# Patient Record
Sex: FEMALE | Race: WHITE | NOT HISPANIC OR LATINO | Employment: OTHER | URBAN - METROPOLITAN AREA
[De-identification: names, ages, dates, MRNs, and addresses within clinical notes are randomized per-mention and may not be internally consistent; named-entity substitution may affect disease eponyms.]

---

## 2017-02-02 ENCOUNTER — LAB VISIT (OUTPATIENT)
Dept: LAB | Facility: HOSPITAL | Age: 67
End: 2017-02-02
Attending: NURSE PRACTITIONER
Payer: MEDICARE

## 2017-02-02 DIAGNOSIS — E11.42 DIABETIC PERIPHERAL NEUROPATHY ASSOCIATED WITH TYPE 2 DIABETES MELLITUS: ICD-10-CM

## 2017-02-02 PROCEDURE — 83036 HEMOGLOBIN GLYCOSYLATED A1C: CPT

## 2017-02-02 PROCEDURE — 36415 COLL VENOUS BLD VENIPUNCTURE: CPT

## 2017-02-03 DIAGNOSIS — E11.42 DIABETIC PERIPHERAL NEUROPATHY ASSOCIATED WITH TYPE 2 DIABETES MELLITUS: ICD-10-CM

## 2017-02-03 LAB
ESTIMATED AVG GLUCOSE: 229 MG/DL
HBA1C MFR BLD HPLC: 9.6 %

## 2017-02-03 NOTE — TELEPHONE ENCOUNTER
----- Message from Karmen Galdamez sent at 2/3/2017 11:02 AM CST -----  Contact: Self [phone]  Pt needs a prescription refill for insulin detemir (LEVEMIR FLEXTOUCH) 100 unit/mL (3 mL) SubQ InPn pen.     [facility] [place], [address]  [phone] (Phone)  [phone] (Fax)

## 2017-02-14 ENCOUNTER — OFFICE VISIT (OUTPATIENT)
Dept: ENDOCRINOLOGY | Facility: CLINIC | Age: 67
End: 2017-02-14
Payer: MEDICARE

## 2017-02-14 VITALS
SYSTOLIC BLOOD PRESSURE: 130 MMHG | HEIGHT: 67 IN | BODY MASS INDEX: 32.55 KG/M2 | WEIGHT: 207.38 LBS | HEART RATE: 83 BPM | DIASTOLIC BLOOD PRESSURE: 83 MMHG

## 2017-02-14 DIAGNOSIS — F41.9 ANXIETY: ICD-10-CM

## 2017-02-14 DIAGNOSIS — E11.59 HYPERTENSION ASSOCIATED WITH DIABETES: Chronic | ICD-10-CM

## 2017-02-14 DIAGNOSIS — E11.69 HYPERLIPIDEMIA ASSOCIATED WITH TYPE 2 DIABETES MELLITUS: Chronic | ICD-10-CM

## 2017-02-14 DIAGNOSIS — E11.42 DIABETIC PERIPHERAL NEUROPATHY ASSOCIATED WITH TYPE 2 DIABETES MELLITUS: Primary | Chronic | ICD-10-CM

## 2017-02-14 DIAGNOSIS — E78.5 HYPERLIPIDEMIA ASSOCIATED WITH TYPE 2 DIABETES MELLITUS: Chronic | ICD-10-CM

## 2017-02-14 DIAGNOSIS — E66.9 OBESITY (BMI 30-39.9): ICD-10-CM

## 2017-02-14 DIAGNOSIS — I15.2 HYPERTENSION ASSOCIATED WITH DIABETES: Chronic | ICD-10-CM

## 2017-02-14 PROCEDURE — 1157F ADVNC CARE PLAN IN RCRD: CPT | Mod: S$GLB,,, | Performed by: NURSE PRACTITIONER

## 2017-02-14 PROCEDURE — 3046F HEMOGLOBIN A1C LEVEL >9.0%: CPT | Mod: S$GLB,,, | Performed by: NURSE PRACTITIONER

## 2017-02-14 PROCEDURE — 1126F AMNT PAIN NOTED NONE PRSNT: CPT | Mod: S$GLB,,, | Performed by: NURSE PRACTITIONER

## 2017-02-14 PROCEDURE — 3079F DIAST BP 80-89 MM HG: CPT | Mod: S$GLB,,, | Performed by: NURSE PRACTITIONER

## 2017-02-14 PROCEDURE — 1159F MED LIST DOCD IN RCRD: CPT | Mod: S$GLB,,, | Performed by: NURSE PRACTITIONER

## 2017-02-14 PROCEDURE — 99214 OFFICE O/P EST MOD 30 MIN: CPT | Mod: S$GLB,,, | Performed by: NURSE PRACTITIONER

## 2017-02-14 PROCEDURE — 3060F POS MICROALBUMINURIA REV: CPT | Mod: S$GLB,,, | Performed by: NURSE PRACTITIONER

## 2017-02-14 PROCEDURE — 99999 PR PBB SHADOW E&M-EST. PATIENT-LVL III: CPT | Mod: PBBFAC,,, | Performed by: NURSE PRACTITIONER

## 2017-02-14 PROCEDURE — 2022F DILAT RTA XM EVC RTNOPTHY: CPT | Mod: S$GLB,,, | Performed by: NURSE PRACTITIONER

## 2017-02-14 PROCEDURE — 3075F SYST BP GE 130 - 139MM HG: CPT | Mod: S$GLB,,, | Performed by: NURSE PRACTITIONER

## 2017-02-14 PROCEDURE — 1160F RVW MEDS BY RX/DR IN RCRD: CPT | Mod: S$GLB,,, | Performed by: NURSE PRACTITIONER

## 2017-02-14 RX ORDER — INSULIN LISPRO 100 [IU]/ML
9 INJECTION, SOLUTION INTRAVENOUS; SUBCUTANEOUS
Qty: 1 VIAL | Refills: 11 | Status: SHIPPED | OUTPATIENT
Start: 2017-02-14 | End: 2017-05-05

## 2017-02-14 NOTE — MR AVS SNAPSHOT
[facility] - Endocrinology  [address]  [facility] [address]  Phone: [phone]                  Lorena Contreras   2017 1:30 PM   Office Visit    Description:  Female : 1950   Provider:  JAYCEE Hathaway,ANP-C   Department:  [facility] - Endocrinology           Reason for Visit     Diabetes Mellitus           Diagnoses this Visit        Comments    Diabetic peripheral neuropathy associated with type 2 diabetes mellitus    -  Primary     Hypertension associated with diabetes         Hyperlipidemia associated with type 2 diabetes mellitus         Obesity (BMI 30-39.9)         Anxiety                To Do List           Future Appointments        Provider Department Dept Phone    3/7/2017 1:00 PM Kenneth Luu MD [facility] - Internal Medicine [phone]    2017 10:00 AM [facility] [phone]    2017 10:10 AM SPECIMEN, [facility] [phone]    2017 10:30 AM JAYCEE Hathaway,ANP-C [facility] [phone]      Goals (5 Years of Data)     None      Follow-Up and Disposition     Return in about 3 months (around 2017).    Follow-up and Disposition History       These Medications        Disp Refills Start End    insulin lispro (HUMALOG) 100 unit/mL injection 1 vial 11 2017    Inject 9 Units into the skin 3 (three) times daily before meals. - Subcutaneous    Pharmacy: [facility] [address] [place] [address] Ph #: [phone]         [facility]     [facility] Care Line -  Assistance  Registered nurses in the [facility] Center provide clinical advisement, health education, appointment booking, and other advisory services.  Call for this free service at [phone].             Medications           Message regarding Medications     Verify the changes and/or additions to your medication regime listed  "below are the same as discussed with your clinician today.  If any of these changes or additions are incorrect, please notify your healthcare provider.        START taking these NEW medications        Refills    insulin lispro (HUMALOG) 100 unit/mL injection 11    Sig: Inject 9 Units into the skin 3 (three) times daily before meals.    Class: Normal    Route: Subcutaneous      STOP taking these medications     insulin aspart (NOVOLOG) 100 unit/mL InPn pen 9 units with meals           Verify that the below list of medications is an accurate representation of the medications you are currently taking.  If none reported, the list may be blank. If incorrect, please contact your healthcare provider. Carry this list with you in case of emergency.           Current Medications     amlodipine (NORVASC) 10 MG tablet Take 1 tablet (10 mg total) by mouth once daily.    atenolol (TENORMIN) 50 MG tablet Take 1 tablet (50 mg total) by mouth 2 (two) times daily.    bismuth tribrom-petrolatum,wh (XEROFORM) 5 X 9 " Bndg Apply dressing to wound daily    blood sugar diagnostic (FREESTYLE TEST) Strp Test blood glucose QAC    ergocalciferol (VITAMIN D2) 50,000 unit Cap Take 1 capsule (50,000 Units total) by mouth every 7 days.    fluoxetine (PROZAC) 10 MG Tab Take 1 tablet (10 mg total) by mouth once daily.    furosemide (LASIX) 20 MG tablet Take 1 tablet (20 mg total) by mouth daily as needed (leg swelling).    gabapentin (NEURONTIN) 300 MG capsule Take 1 capsule (300 mg total) by mouth 3 (three) times daily.    hydrochlorothiazide (HYDRODIURIL) 25 MG tablet Take 1 tablet (25 mg total) by mouth once daily.    insulin detemir (LEVEMIR FLEXTOUCH) 100 unit/mL (3 mL) SubQ InPn pen Inject 30 Units into the skin every evening.    lancets 32 gauge Misc 1 lancet by Misc.(Non-Drug; Combo Route) route 4 (four) times daily.    magnesium oxide (MAG-OX) 400 mg tablet Take 400 mg by mouth once daily.    metformin (GLUCOPHAGE) 1000 MG tablet Take 1 " "tablet (1,000 mg total) by mouth 2 (two) times daily with meals.    pen needle, diabetic 32 gauge x 5/32" Ndle Use with Novolog and Levemir Flexpens    tramadol (ULTRAM) 50 mg tablet Take 1 to 2 tabs every 6 hours as needed for pain.    insulin lispro (HUMALOG) 100 unit/mL injection Inject 9 Units into the skin 3 (three) times daily before meals.           Clinical Reference Information           Your Vitals Were     BP Pulse Height Weight BMI    130/83 (BP Location: Left arm, Patient Position: Sitting) 83 5' 7" (1.702 m) 94.1 kg (207 lb 6.4 oz) 32.48 kg/m2      Blood Pressure          Most Recent Value    BP  130/83      Allergies as of 2/14/2017     Novolin 70/30 (Semi-synthetic)    Victoza [Liraglutide]    Glipizide    Asa [Aspirin]    Citric Acid    Codeine    Egg Derived    Iodine Containing Multivitamin    Peanut    Rituxan [Rituximab]    Soy    Sulfa (Sulfonamide Antibiotics)    Talwin [Pentazocine Lactate]    Zoloft [Sertraline]      Immunizations Administered on Date of Encounter - 2/14/2017     None      Orders Placed During Today's Visit     Future Labs/Procedures Expected by Expires    Hemoglobin A1c  2/14/2017 2/14/2018    Microalbumin/creatinine urine ratio  2/14/2017 4/15/2018      [facility] Sign-Up     Activating your [facility] account is as easy as 1-2-3!     1) Visit [email or web address], select Sign Up Now, enter this activation code and your date of birth, then select Next.  [email or web address]  Expires: 3/31/2017  2:11 PM      2) Create a username and password to use when you visit [facility] in the future and select a security question in case you lose your password and select Next.    3) Enter your e-mail address and click Sign Up!    Additional Information  If you have questions, please e-mail [email or web address] or call [phone] to talk to our [facility] staff. Remember, [facility] is NOT to be used for urgent needs. For medical emergencies, dial 911.         Language Assistance Services     " ATTENTION: Language assistance services are available, free of charge. Please call [phone].      ATENCIÓN: Si habla louisaañol, tiene a jim disposición servicios gratuitos de asistencia lingüística. Llame al [phone].     CHÚ Ý: N?u b?n nói Ti?ng Vi?t, có các d?ch v? h? tr? ngôn ng? mi?n phí dành cho b?n. G?i s? [phone].         [facility] complies with applicable Federal civil rights laws and does not discriminate on the basis of race, color, national origin, age, disability, or sex.

## 2017-02-14 NOTE — PROGRESS NOTES
"CC: Management of Type 2 diabetes and review of chronic medical conditions as listed in the visit diagnosis     HPI: Mrs. Lorena Contreras is a 66 y.o. White female who was diagnosed with Type 2 in 2010 based on labwork. She was on Glipizide, but this was discontinued r/t recurrent hypoglycemia.  She is now on MDI and Metformin. She has been on Januvia in the past, but it was discontinued when she was in the coverage gap. She attempted Victoza, but she experienced severe nausea and abdominal pain. She also reports she lost 8lb on Victoza.     Arrives today with family.     States she misses doses of Humalog at times, but is unable to quantify how often she misses. Sometimes takes Humalog up to one hour after meals.     Doesn't eat breakfast. Snacks between meals on pretzels and SF caramel. No exercise.     Reports her spouse has been sick and has required hospitalization once.     CURRENT DIABETIC MEDS: Levemir 30 units QHS, Humalog 9 units with each meal, and Metformin 1, 000 mg twice daily  Uses Vials or Pens: Pens  Type of Glucose Meter: True Result    Last Eye Exam: 2014  Last Podiatry Exam: None    REVIEW OF SYSTEMS  General: no weakness; (+) chronic fatigue; stable weight, which fluctuates by ~4 lbs.    Eyes: no blurry vision, eye pain, or discharge.    Cardiac: no chest pain or palpitations.   Respiratory: no cough or dyspnea.   GI: no abdominal pain, diarrhea, constipation; occasional nausea.   Skin: no rashes, wounds, or bruising; no insulin injection site reactions.   Neuro: feet with numbness and tingling; reports insomnia, which has been going on for "a while".   Endocrine: no polyuria, polydipsia, polyphagia; (+) nocturia.    Vital Signs  Visit Vitals    /83 (BP Location: Left arm, Patient Position: Sitting)    Pulse 83    Ht 5' 7" (1.702 m)    Wt 94.1 kg (207 lb 6.4 oz)    BMI 32.48 kg/m2       Hemoglobin A1C   Date Value Ref Range Status   02/02/2017 9.6 (H) 4.5 - 6.2 % Final     " Comment:     According to ADA guidelines, hemoglobin A1C <7.0% represents  optimal control in non-pregnant diabetic patients.  Different  metrics may apply to specific populations.   Standards of Medical Care in Diabetes - 2016.  For the purpose of screening for the presence of diabetes:  <5.7%     Consistent with the absence of diabetes  5.7-6.4%  Consistent with increasing risk for diabetes   (prediabetes)  >or=6.5%  Consistent with diabetes  Currently no consensus exists for use of hemoglobin A1C  for diagnosis of diabetes for children.     10/28/2016 10.4 (H) 4.5 - 6.2 % Final     Comment:     According to ADA guidelines, hemoglobin A1C <7.0% represents  optimal control in non-pregnant diabetic patients.  Different  metrics may apply to specific populations.   Standards of Medical Care in Diabetes - 2016.  For the purpose of screening for the presence of diabetes:  <5.7%     Consistent with the absence of diabetes  5.7-6.4%  Consistent with increasing risk for diabetes   (prediabetes)  >or=6.5%  Consistent with diabetes  Currently no consensus exists for use of hemoglobin A1C  for diagnosis of diabetes for children.     08/22/2016 11.2 (H) 4.5 - 6.2 % Final     Comment:     According to ADA guidelines, hemoglobin A1C <7.0% represents  optimal control in non-pregnant diabetic patients.  Different  metrics may apply to specific populations.   Standards of Medical Care in Diabetes - 2016.  For the purpose of screening for the presence of diabetes:  <5.7%     Consistent with the absence of diabetes  5.7-6.4%  Consistent with increasing risk for diabetes   (prediabetes)  >or=6.5%  Consistent with diabetes  Currently no consensus exists for use of hemoglobin A1C  for diagnosis of diabetes for children.         Chemistry        Component Value Date/Time     11/28/2016 1225    K 5.4 (H) 11/28/2016 1225     11/28/2016 1225    CO2 24 11/28/2016 1225    BUN 26 (H) 11/28/2016 1225    CREATININE 1.1 11/28/2016  1225     (H) 11/28/2016 1225        Component Value Date/Time    CALCIUM 9.9 11/28/2016 1225    ALKPHOS 125 01/14/2016 1040    AST 47 (H) 01/14/2016 1040    ALT 47 (H) 01/14/2016 1040    BILITOT 0.3 01/14/2016 1040          Lab Results   Component Value Date    CHOL 203 (H) 10/28/2016    CHOL 214 (H) 08/22/2016    CHOL 212 (H) 10/29/2015     Lab Results   Component Value Date    HDL 42 10/28/2016    HDL 41 08/22/2016    HDL 40 10/29/2015     Lab Results   Component Value Date    LDLCALC 128.8 10/28/2016    LDLCALC 124.2 08/22/2016    LDLCALC 137.4 10/29/2015     Lab Results   Component Value Date    TRIG 161 (H) 10/28/2016    TRIG 244 (H) 08/22/2016    TRIG 173 (H) 10/29/2015     Lab Results   Component Value Date    CHOLHDL 20.7 10/28/2016    CHOLHDL 19.2 (L) 08/22/2016    CHOLHDL 18.9 (L) 10/29/2015     Lab Results   Component Value Date    TSH 0.782 10/28/2016     Lab Results   Component Value Date    MICALBCREAT 10.5 07/24/2015     Vit D, 25-Hydroxy   Date Value Ref Range Status   08/23/2016 87 30 - 96 ng/mL Final     Comment:     Vitamin D deficiency.........<10 ng/mL                              Vitamin D insufficiency......10-29 ng/mL       Vitamin D sufficiency........> or equal to 30 ng/mL  Vitamin D toxicity............>100 ng/mL       PHYSICAL EXAMINATION  Constitutional: Appears well, no distress  Neck: Supple, trachea midline.   Respiratory: CTA without wheezes, even and unlabored.  Cardiovascular: RRR; no carotid bruits or murmurs.   Lymph: DP pulses  2+ bilaterally; no edema.   Skin: warm and dry; no injection site reactions, no acanthosis nigracans observed.  Neuro:patient alert and cooperative, normal affect.    Diabetes Foot Exam:   Protective Sensation (w/ 10 gram monofilament and tuning fork)  Right: Decreased bilaterally with tuning fork  Left: Decreased bilaterally with tuning fork    Visual Inspection:  Normal -  Bilateral and no callouses, interdigital maceration, ulcers, open wounds,  or sores.     Pedal Pulses (DP):   Right: Present  Left: Present    Assessment/Plan  1. Diabetic peripheral neuropathy associated with type 2 diabetes mellitus   DM uncontrolled, but improving. Continue current insulin doses. Take prandial insulin in tandem with meals, not an hour after.     Interested in VGo, will run insurance to check for coverage. May continue Metformin. Monitor BG 4x/day.     Also interested in GLP 1 agonist, but will wait to see benefit information for VGo first so that we are not eating into her coverage and causing her to go into the coverage gap sooner.     Despite noted allergy to Relion 70/30, she tolerated Levemir and Novolog well.    2. Essential hypertension: Continue Amlodipine, Atenolol, and HCTZ.  Reports she takes Furosemide prn. Managed by Dr. Luu.    3. Hyperlipidemia: Has never been on treatment. No treatment at this time. Discussed uncontrolled lipids. Continues to refuse meds.    4. Obesity, Body mass index is 32.48 kg/(m^2).: Can worsen insulin resistance. Weight loss can help.    5. Anxiety: Continue Prozac. Managed by Dr. Luu     FOLLOW UP  Return in about 3 months (around 5/14/2017).     Orders Placed This Encounter   Procedures    Hemoglobin A1c     Standing Status:   Future     Standing Expiration Date:   2/14/2018    Microalbumin/creatinine urine ratio     Standing Status:   Future     Standing Expiration Date:   4/15/2018     Order Specific Question:   Specimen Source     Answer:   Urine

## 2017-02-15 ENCOUNTER — TELEPHONE (OUTPATIENT)
Dept: DIABETES | Facility: CLINIC | Age: 67
End: 2017-02-15

## 2017-02-24 ENCOUNTER — TELEPHONE (OUTPATIENT)
Dept: ENDOCRINOLOGY | Facility: CLINIC | Age: 67
End: 2017-02-24

## 2017-03-07 ENCOUNTER — TELEPHONE (OUTPATIENT)
Dept: INTERNAL MEDICINE | Facility: CLINIC | Age: 67
End: 2017-03-07

## 2017-03-07 ENCOUNTER — OFFICE VISIT (OUTPATIENT)
Dept: INTERNAL MEDICINE | Facility: CLINIC | Age: 67
End: 2017-03-07
Payer: MEDICARE

## 2017-03-07 VITALS — HEART RATE: 62 BPM | SYSTOLIC BLOOD PRESSURE: 132 MMHG | DIASTOLIC BLOOD PRESSURE: 68 MMHG | HEIGHT: 67 IN

## 2017-03-07 DIAGNOSIS — G47.00 INSOMNIA, UNSPECIFIED TYPE: ICD-10-CM

## 2017-03-07 DIAGNOSIS — E83.42 HYPOMAGNESEMIA: ICD-10-CM

## 2017-03-07 DIAGNOSIS — K08.89 TOOTH PAIN: Primary | ICD-10-CM

## 2017-03-07 DIAGNOSIS — L65.9 HAIR LOSS: ICD-10-CM

## 2017-03-07 DIAGNOSIS — E87.5 HYPERKALEMIA: ICD-10-CM

## 2017-03-07 DIAGNOSIS — E11.3592 PROLIFERATIVE DIABETIC RETINOPATHY OF LEFT EYE ASSOCIATED WITH TYPE 2 DIABETES MELLITUS, UNSPECIFIED PROLIFERATIVE RETINOPATHY TYPE: ICD-10-CM

## 2017-03-07 DIAGNOSIS — R21 SKIN RASH: Primary | ICD-10-CM

## 2017-03-07 DIAGNOSIS — G25.81 RESTLESS LEG SYNDROME: ICD-10-CM

## 2017-03-07 DIAGNOSIS — Z85.72 HISTORY OF NON-HODGKIN'S LYMPHOMA: ICD-10-CM

## 2017-03-07 PROCEDURE — 99214 OFFICE O/P EST MOD 30 MIN: CPT | Mod: S$GLB,,, | Performed by: INTERNAL MEDICINE

## 2017-03-07 PROCEDURE — 1159F MED LIST DOCD IN RCRD: CPT | Mod: S$GLB,,, | Performed by: INTERNAL MEDICINE

## 2017-03-07 PROCEDURE — 1157F ADVNC CARE PLAN IN RCRD: CPT | Mod: S$GLB,,, | Performed by: INTERNAL MEDICINE

## 2017-03-07 PROCEDURE — 1125F AMNT PAIN NOTED PAIN PRSNT: CPT | Mod: S$GLB,,, | Performed by: INTERNAL MEDICINE

## 2017-03-07 PROCEDURE — 3060F POS MICROALBUMINURIA REV: CPT | Mod: S$GLB,,, | Performed by: INTERNAL MEDICINE

## 2017-03-07 PROCEDURE — 99499 UNLISTED E&M SERVICE: CPT | Mod: S$PBB,,, | Performed by: INTERNAL MEDICINE

## 2017-03-07 PROCEDURE — 99999 PR PBB SHADOW E&M-EST. PATIENT-LVL III: CPT | Mod: PBBFAC,,, | Performed by: INTERNAL MEDICINE

## 2017-03-07 PROCEDURE — 3075F SYST BP GE 130 - 139MM HG: CPT | Mod: S$GLB,,, | Performed by: INTERNAL MEDICINE

## 2017-03-07 PROCEDURE — 2022F DILAT RTA XM EVC RTNOPTHY: CPT | Mod: S$GLB,,, | Performed by: INTERNAL MEDICINE

## 2017-03-07 PROCEDURE — 1160F RVW MEDS BY RX/DR IN RCRD: CPT | Mod: S$GLB,,, | Performed by: INTERNAL MEDICINE

## 2017-03-07 PROCEDURE — 3078F DIAST BP <80 MM HG: CPT | Mod: S$GLB,,, | Performed by: INTERNAL MEDICINE

## 2017-03-07 PROCEDURE — 3046F HEMOGLOBIN A1C LEVEL >9.0%: CPT | Mod: S$GLB,,, | Performed by: INTERNAL MEDICINE

## 2017-03-07 RX ORDER — IBUPROFEN 800 MG/1
800 TABLET ORAL 3 TIMES DAILY PRN
Qty: 30 TABLET | Refills: 0 | Status: SHIPPED | OUTPATIENT
Start: 2017-03-07 | End: 2018-08-31

## 2017-03-07 RX ORDER — BIOTIN 1000 MCG
1000 TABLET,CHEWABLE ORAL DAILY
COMMUNITY
End: 2018-08-31

## 2017-03-07 RX ORDER — ZOLPIDEM TARTRATE 5 MG/1
5 TABLET ORAL NIGHTLY PRN
Qty: 30 TABLET | Refills: 2 | Status: SHIPPED | OUTPATIENT
Start: 2017-03-07 | End: 2017-11-18 | Stop reason: SDUPTHER

## 2017-03-07 NOTE — TELEPHONE ENCOUNTER
Please clarify with her what she needs it for first; she hasn't had a prescription for it since 2014. I would like to document why it is being sent to the pharmacy.

## 2017-03-07 NOTE — PROGRESS NOTES
"Subjective:       Patient ID: Lorena Contreras is a 66 y.o. female.    Chief Complaint: Follow-up    HPI    Last visit with me 11/2016. Since that time increased Norvasc to 10 mg. Seen by Endocrinology.    Trouble sleeping. Also with tooth pain. Going to see dentist next week.    Anxiety is generally well controlled on Prozac 10 mg, symptoms are still there but manageable.    Pt has iron deficiency anemia, also history of NHL. Seen by Cardiology in past for cardiomegaly. She hasn't kept regular appointments with these teams.    Reviewed PMH, PSH, SH, FH, allergies, and medications.     Review of Systems    As per HPI    Objective:      Physical Exam   Constitutional: She is oriented to person, place, and time. No distress.    woman whose There is no height or weight on file to calculate BMI.    Neurological: She is alert and oriented to person, place, and time.   Psychiatric: Her behavior is normal. Thought content normal. Her mood appears anxious.   Nursing note and vitals reviewed.      Vitals:    03/07/17 1311   BP: 132/68   BP Location: Right arm   Patient Position: Sitting   BP Method: Manual   Pulse: 62   Height: 5' 7" (1.702 m)     There is no height or weight on file to calculate BMI.    RESULTS: Reviewed labs from last 6 months    Assessment:       1. Tooth pain    2. Proliferative diabetic retinopathy of left eye associated with type 2 diabetes mellitus, unspecified proliferative retinopathy type    3. Insomnia, unspecified type    4. Hyperkalemia    5. Hypomagnesemia    6. Hair loss    7. Uncontrolled type 2 diabetes mellitus with diabetic polyneuropathy, with long-term current use of insulin    8. Restless leg syndrome    9. History of non-Hodgkin's lymphoma        Plan:   Lorena was seen today for follow-up.    Diagnoses and all orders for this visit:    Tooth pain:  Need continued follow up with outside Dentistry, take ibuprofen PRN for now to limit pain.  -     ibuprofen (ADVIL,MOTRIN) " 800 MG tablet; Take 1 tablet (800 mg total) by mouth 3 (three) times daily as needed for Pain.    Proliferative diabetic retinopathy of left eye associated with type 2 diabetes mellitus, unspecified proliferative retinopathy type:  Continue follow up with outside Optometry to ensure appopriate treatment for vision.    Insomnia, unspecified type:  Restart Zolpidem. Likely some contribution from anxiety, however the patient reports her anxiety is stable and does not feel need to make changes to regimen at this time.  -     zolpidem (AMBIEN) 5 MG Tab; Take 1 tablet (5 mg total) by mouth nightly as needed.    Hyperkalemia:  Seen on prior labs, will stop lisinopril to limit symptoms. Also low Mg.  -     Basic metabolic panel; Future    Hypomagnesemia:  Persists, continue Mg oxide 400 daily.  -     Magnesium; Future    Hair loss:  Unclear etiology, start over-the-counter biotin to see if this improves symptoms, if not refer to Dermatology.    Uncontrolled type 2 diabetes mellitus with diabetic polyneuropathy, with long-term current use of insulin:  Continue follow up with Endocrinology for management.    Restless leg syndrome:  Has history of iron deficiency anemia, start iron supplement, check levels.  -     Iron and TIBC; Future  -     Ferritin; Future  -     CBC Without Differential; Future    History of non-Hodgkin's lymphoma:  Needs follow up with Oncology to determine appropriate surveillance, emphasized importance of routine follow up, instructed the patient to call to schedule a visit.    Return in about 4 months (around 7/7/2017).  Kenneth Luu MD  Internal Medicine    Portions of this note were completed using Snappli dictation software. Please excuse typographical or syntax errors.

## 2017-03-07 NOTE — MR AVS SNAPSHOT
[facility]  [address]  [place] [address]  Phone: [phone]  Fax: [phone]                  Lorena Contreras   3/7/2017 1:00 PM   Office Visit    Description:  Female : 1950   Provider:  Kenneth Luu MD   Department:  [facility]           Reason for Visit     Annual Exam           Diagnoses this Visit        Comments    Tooth pain    -  Primary     Proliferative diabetic retinopathy of left eye associated with type 2 diabetes mellitus, unspecified proliferative retinopathy type         Insomnia, unspecified type         Hyperkalemia         Hypomagnesemia         Hair loss         Uncontrolled type 2 diabetes mellitus with diabetic polyneuropathy, with long-term current use of insulin         Restless leg syndrome         History of non-Hodgkin's lymphoma                To Do List           Future Appointments        Provider Department Dept Phone    2017 10:00 AM LAB[facility] [phone]    2017 10:10 AM SPECIMEN, [facility] [phone]    2017 10:30 AM JAYCEE Hathaway,ANP-C [facility] - Endocrinology [phone]      Goals (5 Years of Data)     None      Follow-Up and Disposition     Return in about 4 months (around 2017).       These Medications        Disp Refills Start End    ibuprofen (ADVIL,MOTRIN) 800 MG tablet 30 tablet 0 3/7/2017     Take 1 tablet (800 mg total) by mouth 3 (three) times daily as needed for Pain. - Oral    Pharmacy: [facility] [address] [place] [address] [address] [facility] Ph #: [phone]       zolpidem (AMBIEN) 5 MG Tab 30 tablet 2 3/7/2017 2017    Take 1 tablet (5 mg total) by mouth nightly as needed. - Oral    Pharmacy: [facility] - [place] [place] - [address] [facility] Ph #: [phone]         [facility] On Call     [facility] On [facility] Nurse Care Line -  Assistance  Registered nurses  "in the [facility] provide clinical advisement, health education, appointment booking, and other advisory services.  Call for this free service at [phone].             Medications           Message regarding Medications     Verify the changes and/or additions to your medication regime listed below are the same as discussed with your clinician today.  If any of these changes or additions are incorrect, please notify your healthcare provider.        START taking these NEW medications        Refills    ibuprofen (ADVIL,MOTRIN) 800 MG tablet 0    Sig: Take 1 tablet (800 mg total) by mouth 3 (three) times daily as needed for Pain.    Class: Normal    Route: Oral    zolpidem (AMBIEN) 5 MG Tab 2    Sig: Take 1 tablet (5 mg total) by mouth nightly as needed.    Class: Normal    Route: Oral           Verify that the below list of medications is an accurate representation of the medications you are currently taking.  If none reported, the list may be blank. If incorrect, please contact your healthcare provider. Carry this list with you in case of emergency.           Current Medications     amlodipine (NORVASC) 10 MG tablet Take 1 tablet (10 mg total) by mouth once daily.    atenolol (TENORMIN) 50 MG tablet Take 1 tablet (50 mg total) by mouth 2 (two) times daily.    biotin 1,000 mcg Chew Take 1,000 mcg by mouth once daily.    bismuth tribrom-petrolatum,wh (XEROFORM) 5 X 9 " Bndg Apply dressing to wound daily    blood sugar diagnostic (FREESTYLE TEST) Strp Test blood glucose QAC    ergocalciferol (VITAMIN D2) 50,000 unit Cap Take 1 capsule (50,000 Units total) by mouth every 7 days.    fluoxetine (PROZAC) 10 MG Tab Take 1 tablet (10 mg total) by mouth once daily.    furosemide (LASIX) 20 MG tablet Take 1 tablet (20 mg total) by mouth daily as needed (leg swelling).    gabapentin (NEURONTIN) 300 MG capsule Take 1 capsule (300 mg total) by mouth 3 (three) times daily.    hydrochlorothiazide (HYDRODIURIL) 25 MG " "tablet Take 1 tablet (25 mg total) by mouth once daily.    insulin detemir (LEVEMIR FLEXTOUCH) 100 unit/mL (3 mL) SubQ InPn pen Inject 30 Units into the skin every evening.    insulin lispro (HUMALOG) 100 unit/mL injection Inject 9 Units into the skin 3 (three) times daily before meals.    lancets 32 gauge Misc 1 lancet by Misc.(Non-Drug; Combo Route) route 4 (four) times daily.    magnesium oxide (MAG-OX) 400 mg tablet Take 400 mg by mouth once daily.    metformin (GLUCOPHAGE) 1000 MG tablet Take 1 tablet (1,000 mg total) by mouth 2 (two) times daily with meals.    pen needle, diabetic 32 gauge x 5/32" Ndle Use with Novolog and Levemir Flexpens    tramadol (ULTRAM) 50 mg tablet Take 1 to 2 tabs every 6 hours as needed for pain.    ibuprofen (ADVIL,MOTRIN) 800 MG tablet Take 1 tablet (800 mg total) by mouth 3 (three) times daily as needed for Pain.    zolpidem (AMBIEN) 5 MG Tab Take 1 tablet (5 mg total) by mouth nightly as needed.           Clinical Reference Information           Your Vitals Were     BP Pulse Height             132/68 (BP Location: Right arm, Patient Position: Sitting, BP Method: Manual) 62 5' 7" (1.702 m)         Blood Pressure          Most Recent Value    BP  132/68      Allergies as of 3/7/2017     Novolin 70/30 (Semi-synthetic)    Victoza [Liraglutide]    Glipizide    Asa [Aspirin]    Citric Acid    Codeine    Egg Derived    Iodine Containing Multivitamin    Rituxan [Rituximab]    Soy    Sulfa (Sulfonamide Antibiotics)    Talwin [Pentazocine Lactate]    Zoloft [Sertraline]      Immunizations Administered on Date of Encounter - 3/7/2017     None      Orders Placed During Today's Visit     Future Labs/Procedures Expected by Expires    Basic metabolic panel  3/7/2017 3/7/2018    CBC Without Differential  3/7/2017 5/6/2018    Ferritin  3/7/2017 5/6/2018    Iron and TIBC  3/7/2017 5/6/2018    Magnesium  3/7/2017 (Approximate) 5/6/2018      [facility] Sign-Up     Activating your [facility] account is " as easy as 1-2-3!     1) Visit [email or web address], select Sign Up Now, enter this activation code and your date of birth, then select Next.  [email or web address]  Expires: 3/31/2017  2:11 PM      2) Create a username and password to use when you visit [facility] in the future and select a security question in case you lose your password and select Next.    3) Enter your e-mail address and click Sign Up!    Additional Information  If you have questions, please e-mail [email or web address] or call [phone] to talk to our [facility] staff. Remember, [facility] is NOT to be used for urgent needs. For medical emergencies, dial 911.         Instructions    Please call Heme/Onc department to schedule follow up with Dr Mary, also schedule with Cardiology Dr Rico.    I would like you to start an over-the-counter iron supplement called Ferrous Sulfate 325 mg, taken once daily. Side effects that some people experience with iron supplements are some mild stomach upset, black-colored stools, and constipation. If you develop constipation, please take an over-the-counter stool softener like Colace, Metamucil, or Dulcolax. If you have problems taking the iron supplement, please notify the office so we can discuss other options.        Language Assistance Services     ATTENTION: Language assistance services are available, free of charge. Please call [phone].      ATENCIÓN: Si habla español, tiene a jim disposición servicios gratuitos de asistencia lingüística. Llame al [phone].     CHÚ Ý: N?u b?n nói Ti?ng Vi?t, có các d?ch v? h? tr? ngôn ng? mi?n phí dành cho b?n. G?i s? [phone].         [facility] complies with applicable Federal civil rights laws and does not discriminate on the basis of race, color, national origin, age, disability, or sex.

## 2017-03-07 NOTE — PATIENT INSTRUCTIONS
Please call Heme/Onc department to schedule follow up with Dr Mary, also schedule with Cardiology Dr Rico.    I would like you to start an over-the-counter iron supplement called Ferrous Sulfate 325 mg, taken once daily. Side effects that some people experience with iron supplements are some mild stomach upset, black-colored stools, and constipation. If you develop constipation, please take an over-the-counter stool softener like Colace, Metamucil, or Dulcolax. If you have problems taking the iron supplement, please notify the office so we can discuss other options.

## 2017-03-09 ENCOUNTER — LAB VISIT (OUTPATIENT)
Dept: LAB | Facility: HOSPITAL | Age: 67
End: 2017-03-09
Attending: INTERNAL MEDICINE
Payer: MEDICARE

## 2017-03-09 DIAGNOSIS — E11.9 TYPE 2 DIABETES MELLITUS WITHOUT COMPLICATION: ICD-10-CM

## 2017-03-09 LAB
CREAT UR-MCNC: 36 MG/DL
MICROALBUMIN UR DL<=1MG/L-MCNC: 37 UG/ML
MICROALBUMIN/CREATININE RATIO: 102.8 UG/MG

## 2017-03-09 PROCEDURE — 82570 ASSAY OF URINE CREATININE: CPT

## 2017-03-09 RX ORDER — TRIAMCINOLONE ACETONIDE 1 MG/G
CREAM TOPICAL 2 TIMES DAILY PRN
Qty: 45 G | Refills: 2 | Status: SHIPPED | OUTPATIENT
Start: 2017-03-09 | End: 2018-03-13 | Stop reason: SDUPTHER

## 2017-03-09 NOTE — TELEPHONE ENCOUNTER
Encountered patient in clinic.  She reports the triamcinolone cream is for a recurrent rash that occurs underneath her breasts from time to time.  Responds well to short course of triamcinolone.  I will send this medication into the pharmacy.

## 2017-03-16 ENCOUNTER — TELEPHONE (OUTPATIENT)
Dept: ENDOCRINOLOGY | Facility: CLINIC | Age: 67
End: 2017-03-16

## 2017-03-16 NOTE — TELEPHONE ENCOUNTER
Spoke to Rochelle at [facility] and she wanted VY Sandoval to know that her Humalog hasn't been picked up due to the insurance won't cover it,and that she picked up on 2- Glipizide Rx which she doesn't think that the patient is on. She just wanted us to be a ware of these things. Going to get a prior autho due to she has allergies to the novolog.

## 2017-03-20 ENCOUNTER — TELEPHONE (OUTPATIENT)
Dept: ENDOCRINOLOGY | Facility: CLINIC | Age: 67
End: 2017-03-20

## 2017-03-27 RX ORDER — FERROUS SULFATE 325(65) MG
325 TABLET ORAL 2 TIMES DAILY
COMMUNITY
End: 2018-08-31

## 2017-04-07 ENCOUNTER — TELEPHONE (OUTPATIENT)
Dept: ENDOCRINOLOGY | Facility: CLINIC | Age: 67
End: 2017-04-07

## 2017-04-07 NOTE — TELEPHONE ENCOUNTER
Left message for the patient that VY Sandoval wanted her to know that VGo cost will be $90/month in addition to the cost of her rapid-acting insulin. Let me know if you  decides to go on the VGo.

## 2017-04-07 NOTE — TELEPHONE ENCOUNTER
----- Message from JAYCEE Hathaway,ANP-C sent at 4/6/2017  4:42 PM CDT -----  Regarding: VGo  Please call and notify patient that VGo cost will be $90/month in addition to the cost of her rapid-acting insulin. Let me know if she decides on the VGo.

## 2017-04-19 RX ORDER — INSULIN ASPART 100 [IU]/ML
INJECTION, SOLUTION INTRAVENOUS; SUBCUTANEOUS
Qty: 1 BOX | Refills: 4 | Status: SHIPPED | OUTPATIENT
Start: 2017-04-19 | End: 2017-04-19 | Stop reason: SDUPTHER

## 2017-04-19 RX ORDER — INSULIN ASPART 100 [IU]/ML
INJECTION, SOLUTION INTRAVENOUS; SUBCUTANEOUS
Qty: 1 BOX | Refills: 4 | Status: SHIPPED | OUTPATIENT
Start: 2017-04-19 | End: 2017-05-05

## 2017-04-19 NOTE — TELEPHONE ENCOUNTER
----- Message from Karmen Galdamez sent at 4/19/2017  1:47 PM CDT -----  Contact: Self [phone]  Pt needs a prescription for Novolog.   Levemir FlexPen is too expensive, the pt is requesting the vials.    [facility] [place], [address]  [phone] (Phone)  [phone] (Fax)

## 2017-04-20 ENCOUNTER — TELEPHONE (OUTPATIENT)
Dept: ENDOCRINOLOGY | Facility: CLINIC | Age: 67
End: 2017-04-20

## 2017-04-20 NOTE — TELEPHONE ENCOUNTER
----- Message from Catie Jhaveri sent at 4/19/2017  4:34 PM CDT -----  Contact: Taqueria from [facility]  Taqueria is calling to request that you change the prescription for the insulin detemir (LEVEMIR) 100 unit/mL injection to the pen.  Please call him to discuss.    Taqueria can be reached at [phone]

## 2017-05-01 ENCOUNTER — LAB VISIT (OUTPATIENT)
Dept: LAB | Facility: HOSPITAL | Age: 67
End: 2017-05-01
Attending: NURSE PRACTITIONER
Payer: MEDICARE

## 2017-05-01 DIAGNOSIS — E11.42 DIABETIC PERIPHERAL NEUROPATHY ASSOCIATED WITH TYPE 2 DIABETES MELLITUS: Chronic | ICD-10-CM

## 2017-05-01 LAB
CREAT UR-MCNC: 27 MG/DL
MICROALBUMIN UR DL<=1MG/L-MCNC: 57 UG/ML
MICROALBUMIN/CREATININE RATIO: 211.1 UG/MG

## 2017-05-01 PROCEDURE — 82570 ASSAY OF URINE CREATININE: CPT

## 2017-05-05 ENCOUNTER — OFFICE VISIT (OUTPATIENT)
Dept: ENDOCRINOLOGY | Facility: CLINIC | Age: 67
End: 2017-05-05
Payer: MEDICARE

## 2017-05-05 VITALS
SYSTOLIC BLOOD PRESSURE: 134 MMHG | WEIGHT: 212 LBS | BODY MASS INDEX: 33.27 KG/M2 | HEIGHT: 67 IN | HEART RATE: 84 BPM | DIASTOLIC BLOOD PRESSURE: 80 MMHG

## 2017-05-05 DIAGNOSIS — E11.69 HYPERLIPIDEMIA ASSOCIATED WITH TYPE 2 DIABETES MELLITUS: Chronic | ICD-10-CM

## 2017-05-05 DIAGNOSIS — Z59.86 PATIENT CANNOT AFFORD MEDICATIONS: ICD-10-CM

## 2017-05-05 DIAGNOSIS — R80.9 PROTEINURIA, UNSPECIFIED TYPE: ICD-10-CM

## 2017-05-05 DIAGNOSIS — F41.9 ANXIETY: ICD-10-CM

## 2017-05-05 DIAGNOSIS — E11.42 DIABETIC PERIPHERAL NEUROPATHY ASSOCIATED WITH TYPE 2 DIABETES MELLITUS: Primary | Chronic | ICD-10-CM

## 2017-05-05 DIAGNOSIS — E78.5 HYPERLIPIDEMIA ASSOCIATED WITH TYPE 2 DIABETES MELLITUS: Chronic | ICD-10-CM

## 2017-05-05 DIAGNOSIS — I15.2 HYPERTENSION ASSOCIATED WITH DIABETES: Chronic | ICD-10-CM

## 2017-05-05 DIAGNOSIS — E66.9 OBESITY (BMI 30-39.9): ICD-10-CM

## 2017-05-05 DIAGNOSIS — E11.59 HYPERTENSION ASSOCIATED WITH DIABETES: Chronic | ICD-10-CM

## 2017-05-05 PROCEDURE — 1160F RVW MEDS BY RX/DR IN RCRD: CPT | Mod: S$GLB,,, | Performed by: NURSE PRACTITIONER

## 2017-05-05 PROCEDURE — 3075F SYST BP GE 130 - 139MM HG: CPT | Mod: S$GLB,,, | Performed by: NURSE PRACTITIONER

## 2017-05-05 PROCEDURE — 99214 OFFICE O/P EST MOD 30 MIN: CPT | Mod: S$GLB,,, | Performed by: NURSE PRACTITIONER

## 2017-05-05 PROCEDURE — 1159F MED LIST DOCD IN RCRD: CPT | Mod: S$GLB,,, | Performed by: NURSE PRACTITIONER

## 2017-05-05 PROCEDURE — 1125F AMNT PAIN NOTED PAIN PRSNT: CPT | Mod: S$GLB,,, | Performed by: NURSE PRACTITIONER

## 2017-05-05 PROCEDURE — 3046F HEMOGLOBIN A1C LEVEL >9.0%: CPT | Mod: S$GLB,,, | Performed by: NURSE PRACTITIONER

## 2017-05-05 PROCEDURE — 3079F DIAST BP 80-89 MM HG: CPT | Mod: S$GLB,,, | Performed by: NURSE PRACTITIONER

## 2017-05-05 PROCEDURE — 99999 PR PBB SHADOW E&M-EST. PATIENT-LVL III: CPT | Mod: PBBFAC,,, | Performed by: NURSE PRACTITIONER

## 2017-05-05 RX ORDER — INSULIN ASPART 100 [IU]/ML
INJECTION, SUSPENSION SUBCUTANEOUS
Qty: 1 BOX | Refills: 11 | Status: SHIPPED | OUTPATIENT
Start: 2017-05-05 | End: 2017-05-19 | Stop reason: SDUPTHER

## 2017-05-05 SDOH — SOCIAL DETERMINANTS OF HEALTH (SDOH): FINANCIAL INSECURITY: Z59.86

## 2017-05-05 NOTE — PROGRESS NOTES
CC: Management of Type 2 diabetes and review of chronic medical conditions as listed in the visit diagnosis     HPI: Mrs. Lorena Contreras is a 66 y.o. White female who was diagnosed with Type 2 in 2010 based on labwork. She was on Glipizide, but this was discontinued r/t recurrent hypoglycemia.  She is now on MDI and Metformin. She has been on Januvia in the past, but it was discontinued when she was in the coverage gap. She attempted Victoza, but she experienced severe nausea and abdominal pain. She also reports she lost 8lb on Victoza.     At her last visit her insulin doses were continued and we discussed changing to VGo. Benefit investigation was run which showed a cost of $90/month for the device plus the cost of rapid acting insulin.     Today she reports nonadherence with insulin r/t inability to afford therapy. Of note, she is allergic to Relion 70/30 (vomiting), which has been tried in the past related to finances.    States BGs have been 300s-400s, up to 500s since not being on insulin. Readings on insulin were up to 150s.     Reports she hasn't taken her insulin in 2 weeks related to inability to afford. The cost of her insulin is $232.    Doesn't eat breakfast, but eats lunch and dinner. Snacks on peanut butter and marshmallow fluff. Admits to having a lot of fruit.     Reports she hasn't exercised.       Of note, she was taking Ibuprofen 800 mg tid. She stopped this 3 days ago.     CURRENT DIABETIC MEDS: Levemir 30 units QHS, Humalog 9 units with each meal, and Metformin 1, 000 mg twice daily  Uses Vials or Pens: Pens  Type of Glucose Meter: True Result    Last Eye Exam: 2014  Last Podiatry Exam: None    REVIEW OF SYSTEMS  General: no weakness; (+) chronic fatigue; weight increased by 5 pounds since last visit.    Eyes: no blurry vision, eye pain, or discharge.    Cardiac: no chest pain or palpitations.   Respiratory: no cough or dyspnea.   GI: no abdominal pain, diarrhea, constipation; occasional  "nausea.   Skin: no rashes, wounds, or bruising; no insulin injection site reactions.   Neuro: feet with numbness and tingling; reports insomnia, which has been going on for "a while".   Endocrine: no polyuria, polydipsia, polyphagia; (+) nocturia.    Vital Signs  /80  Pulse 84  Ht 5' 7" (1.702 m)  Wt 96.2 kg (212 lb)  BMI 33.2 kg/m2    Hemoglobin A1C   Date Value Ref Range Status   05/01/2017 10.4 (H) 4.5 - 6.2 % Final     Comment:     According to ADA guidelines, hemoglobin A1C <7.0% represents  optimal control in non-pregnant diabetic patients.  Different  metrics may apply to specific populations.   Standards of Medical Care in Diabetes - 2016.  For the purpose of screening for the presence of diabetes:  <5.7%     Consistent with the absence of diabetes  5.7-6.4%  Consistent with increasing risk for diabetes   (prediabetes)  >or=6.5%  Consistent with diabetes  Currently no consensus exists for use of hemoglobin A1C  for diagnosis of diabetes for children.     02/02/2017 9.6 (H) 4.5 - 6.2 % Final     Comment:     According to ADA guidelines, hemoglobin A1C <7.0% represents  optimal control in non-pregnant diabetic patients.  Different  metrics may apply to specific populations.   Standards of Medical Care in Diabetes - 2016.  For the purpose of screening for the presence of diabetes:  <5.7%     Consistent with the absence of diabetes  5.7-6.4%  Consistent with increasing risk for diabetes   (prediabetes)  >or=6.5%  Consistent with diabetes  Currently no consensus exists for use of hemoglobin A1C  for diagnosis of diabetes for children.     10/28/2016 10.4 (H) 4.5 - 6.2 % Final     Comment:     According to ADA guidelines, hemoglobin A1C <7.0% represents  optimal control in non-pregnant diabetic patients.  Different  metrics may apply to specific populations.   Standards of Medical Care in Diabetes - 2016.  For the purpose of screening for the presence of diabetes:  <5.7%     Consistent with the absence " of diabetes  5.7-6.4%  Consistent with increasing risk for diabetes   (prediabetes)  >or=6.5%  Consistent with diabetes  Currently no consensus exists for use of hemoglobin A1C  for diagnosis of diabetes for children.         Chemistry        Component Value Date/Time     05/01/2017 1015    K 5.4 (H) 05/01/2017 1015     05/01/2017 1015    CO2 26 05/01/2017 1015    BUN 28 (H) 05/01/2017 1015    CREATININE 1.2 05/01/2017 1015     (H) 05/01/2017 1015        Component Value Date/Time    CALCIUM 9.9 05/01/2017 1015    ALKPHOS 125 01/14/2016 1040    AST 47 (H) 01/14/2016 1040    ALT 47 (H) 01/14/2016 1040    BILITOT 0.3 01/14/2016 1040          Lab Results   Component Value Date    CHOL 203 (H) 10/28/2016    CHOL 214 (H) 08/22/2016    CHOL 212 (H) 10/29/2015     Lab Results   Component Value Date    HDL 42 10/28/2016    HDL 41 08/22/2016    HDL 40 10/29/2015     Lab Results   Component Value Date    LDLCALC 128.8 10/28/2016    LDLCALC 124.2 08/22/2016    LDLCALC 137.4 10/29/2015     Lab Results   Component Value Date    TRIG 161 (H) 10/28/2016    TRIG 244 (H) 08/22/2016    TRIG 173 (H) 10/29/2015     Lab Results   Component Value Date    CHOLHDL 20.7 10/28/2016    CHOLHDL 19.2 (L) 08/22/2016    CHOLHDL 18.9 (L) 10/29/2015     Lab Results   Component Value Date    TSH 0.782 10/28/2016     Lab Results   Component Value Date    MICALBCREAT 211.1 (H) 05/01/2017     Vit D, 25-Hydroxy   Date Value Ref Range Status   08/23/2016 87 30 - 96 ng/mL Final     Comment:     Vitamin D deficiency.........<10 ng/mL                              Vitamin D insufficiency......10-29 ng/mL       Vitamin D sufficiency........> or equal to 30 ng/mL  Vitamin D toxicity............>100 ng/mL       PHYSICAL EXAMINATION  Constitutional: Appears well, no distress  Neck: Supple, trachea midline.   Respiratory: CTA without wheezes, even and unlabored.  Cardiovascular: RRR; faint right carotid bruits; no murmurs.   Lymph: DP pulses  2+  bilaterally; no edema.   Skin: warm and dry; no injection site reactions, no acanthosis nigracans observed.  Neuro:patient alert and cooperative, normal affect.  Diabetes Foot Exam: see foot exam from note dated 2/14/17; appropriate footwear.     Assessment/Plan  1. Diabetic peripheral neuropathy associated with type 2 diabetes mellitus     DM uncontrolled  Unable to afford MDI  Looked up formulary. Novolog 70/30 covered on tier 3.   D/C MDI r/t cost of 2 insulins.   Switch to Novolog 70/30: 12 units before lunch and dinner (doesn't eat breakfast).   Monitor BG 4x/day.   Notify me for any issues.     Despite noted allergy to Relion 70/30, she tolerated Levemir and Novolog well.    2. Essential hypertension:   Continue Amlodipine, Atenolol, and HCTZ.  Reports she takes Furosemide prn. Managed by Dr. Luu.    3. Hyperlipidemia:   Has never been on treatment. No treatment at this time. Discussed uncontrolled lipids. Continues to refuse meds.    4. Obesity, Body mass index is 33.2 kg/(m^2).:   Can worsen insulin resistance. Weight loss can help.    5. Anxiety: Continue Prozac. Managed by Dr. Luu   6.  Patient Cannot Afford Medications: Changed to mix insulin r/t cost.    7.  Proteinuria: unsure if r/t diabetes or taking Ibuprofen 800 mg tid. She has stopped Ibuprofen. Will recheck.      FOLLOW UP  Return in about 3 months (around 8/5/2017).     Orders Placed This Encounter   Procedures    Hemoglobin A1c     Standing Status:   Future     Standing Expiration Date:   5/5/2018    Microalbumin/creatinine urine ratio     Standing Status:   Future     Standing Expiration Date:   7/4/2018     Order Specific Question:   Specimen Source     Answer:   Urine

## 2017-05-05 NOTE — MR AVS SNAPSHOT
[facility] - Endocrinology  [address]  [facility] [address]  Phone: [phone]                  Lorena Contreras   2017 10:30 AM   Office Visit    Description:  Female : 1950   Provider:  JAYCEE Hathaway,ANP-C   Department:  [facility] End[facility]inology           Reason for Visit     Diabetes Mellitus           Diagnoses this Visit        Comments    Diabetic peripheral neuropathy associated with type 2 diabetes mellitus    -  Primary     Hypertension associated with diabetes         Hyperlipidemia associated with type 2 diabetes mellitus         Obesity (BMI 30-39.9)         Anxiety         Patient cannot afford medications         Proteinuria, unspecified type                To Do List           Future Appointments        Provider Department Dept Phone    2017 8:10 AM LAB, APPOINTMENT [facility] [phone]    2017 8:20 AM LAB, APPOINTMENT [facility] [phone]    2017 7:00 AM JAYCEE Hathaway,ANP-C [facility] [phone]      Goals (5 Years of Data)     None      Follow-Up and Disposition     Return in about 3 months (around 2017).    Follow-up and Disposition History       These Medications        Disp Refills Start End    insulin aspart protamine-insulin aspart (NOVOLOG MIX 70-30 FLEXPEN) 100 unit/mL (70-30) InPn pen 1 Box 11 2017     Take 12 units subQ twice daily.    Pharmacy: [facility] [address] [place] [address] Ph #: [phone]         [facility]     [facility] -  Assistance  Unless otherwise directed by your provider, please contact [facility], our nurse care line that is available for  assistance.     Registered nurses in the [facility] provide: appointment scheduling, clinical advisement, health education, and other advisory services.  Call: [phone] (toll free)      "          Medications           Message regarding Medications     Verify the changes and/or additions to your medication regime listed below are the same as discussed with your clinician today.  If any of these changes or additions are incorrect, please notify your healthcare provider.        START taking these NEW medications        Refills    insulin aspart protamine-insulin aspart (NOVOLOG MIX 70-30 FLEXPEN) 100 unit/mL (70-30) InPn pen 11    Sig: Take 12 units subQ twice daily.    Class: Normal      STOP taking these medications     insulin aspart (NOVOLOG) 100 unit/mL InPn pen Inject 9 Units w/ meals    blood sugar diagnostic (FREESTYLE TEST) Strp Test blood glucose QAC    insulin detemir (LEVEMIR) 100 unit/mL injection Inject 30 Units into the skin every evening.    insulin lispro (HUMALOG) 100 unit/mL injection Inject 9 Units into the skin 3 (three) times daily before meals.           Verify that the below list of medications is an accurate representation of the medications you are currently taking.  If none reported, the list may be blank. If incorrect, please contact your healthcare provider. Carry this list with you in case of emergency.           Current Medications     amlodipine (NORVASC) 10 MG tablet Take 1 tablet (10 mg total) by mouth once daily.    atenolol (TENORMIN) 50 MG tablet Take 1 tablet (50 mg total) by mouth 2 (two) times daily.    biotin 1,000 mcg Chew Take 1,000 mcg by mouth once daily. Taking 5,000    bismuth tribrom-petrolatum,wh (XEROFORM) 5 X 9 " Bndg Apply dressing to wound daily    ergocalciferol (VITAMIN D2) 50,000 unit Cap Take 1 capsule (50,000 Units total) by mouth every 7 days.    ferrous sulfate 325 mg (65 mg iron) Tab tablet Take 325 mg by mouth 2 (two) times daily.     fluoxetine (PROZAC) 10 MG Tab Take 1 tablet (10 mg total) by mouth once daily.    furosemide (LASIX) 20 MG tablet Take 1 tablet (20 mg total) by mouth daily as needed (leg swelling).    gabapentin (NEURONTIN) " "300 MG capsule Take 1 capsule (300 mg total) by mouth 3 (three) times daily.    hydrochlorothiazide (HYDRODIURIL) 25 MG tablet Take 1 tablet (25 mg total) by mouth once daily.    ibuprofen (ADVIL,MOTRIN) 800 MG tablet Take 1 tablet (800 mg total) by mouth 3 (three) times daily as needed for Pain.    lancets 32 gauge Misc 1 lancet by Misc.(Non-Drug; Combo Route) route 4 (four) times daily.    magnesium oxide (MAG-OX) 400 mg tablet Take 400 mg by mouth once daily.    metformin (GLUCOPHAGE) 1000 MG tablet Take 1 tablet (1,000 mg total) by mouth 2 (two) times daily with meals.    pen needle, diabetic 32 gauge x 5/32" Ndle Use with Novolog and Levemir Flexpens    insulin aspart protamine-insulin aspart (NOVOLOG MIX 70-30 FLEXPEN) 100 unit/mL (70-30) InPn pen Take 12 units subQ twice daily.    tramadol (ULTRAM) 50 mg tablet Take 1 to 2 tabs every 6 hours as needed for pain.    triamcinolone acetonide 0.1% (KENALOG) 0.1 % cream Apply topically 2 (two) times daily as needed.    zolpidem (AMBIEN) 5 MG Tab Take 1 tablet (5 mg total) by mouth nightly as needed.           Clinical Reference Information           Your Vitals Were     BP Pulse Height Weight BMI    134/80 84 5' 7" (1.702 m) 96.2 kg (212 lb) 33.2 kg/m2      Blood Pressure          Most Recent Value    BP  134/80      Allergies as of 5/5/2017     Novolin 70/30 (Semi-synthetic)    Victoza [Liraglutide]    Glipizide    Asa [Aspirin]    Citric Acid    Codeine    Egg Derived    Iodine Containing Multivitamin    Rituxan [Rituximab]    Soy    Sulfa (Sulfonamide Antibiotics)    Talwin [Pentazocine Lactate]    Zoloft [Sertraline]      Immunizations Administered on Date of Encounter - 5/5/2017     None      Orders Placed During Today's Visit     Future Labs/Procedures Expected by Expires    Hemoglobin A1c  5/5/2017 5/5/2018    Microalbumin/creatinine urine ratio  5/5/2017 7/4/2018      [employer] Sign-Up     Activating your [facility] account is as easy as 1-2-3!     1) Visit " [email or web address], select Sign Up Now, enter this activation code and your date of birth, then select Next.  [email or web address]  Expires: 6/19/2017 11:24 AM      2) Create a username and password to use when you visit [facility] in the future and select a security question in case you lose your password and select Next.    3) Enter your e-mail address and click Sign Up!    Additional Information  If you have questions, please e-mail [email or web address] or call [phone] to talk to our [facility] staff. Remember, [facility] is NOT to be used for urgent needs. For medical emergencies, dial 911.         Language Assistance Services     ATTENTION: Language assistance services are available, free of charge. Please call [phone].      ATENCIÓN: Si habla español, tiene a jim disposición servicios gratuitos de asistencia lingüística. Llame al [phone].     CHÚ Ý: N?u b?n nói Ti?ng Vi?t, có các d?ch v? h? tr? ngôn ng? mi?n phí dành cho b?n. G?i s? [phone].         [facility] complies with applicable Federal civil rights laws and does not discriminate on the basis of race, color, national origin, age, disability, or sex.

## 2017-05-19 ENCOUNTER — TELEPHONE (OUTPATIENT)
Dept: DIABETES | Facility: CLINIC | Age: 67
End: 2017-05-19

## 2017-05-19 DIAGNOSIS — E11.42 DIABETIC PERIPHERAL NEUROPATHY ASSOCIATED WITH TYPE 2 DIABETES MELLITUS: Chronic | ICD-10-CM

## 2017-05-19 RX ORDER — INSULIN ASPART 100 [IU]/ML
INJECTION, SUSPENSION SUBCUTANEOUS
Qty: 1 BOX | Refills: 11
Start: 2017-05-19 | End: 2017-09-12 | Stop reason: SDUPTHER

## 2017-05-19 NOTE — TELEPHONE ENCOUNTER
Called patient regarding elevated BG on 70/30. Reports BGs in 400s. Instructed to increase her insulin to 18 units bid. Will call her next week to obtain BG readings. Verbalized understanding.

## 2017-05-19 NOTE — TELEPHONE ENCOUNTER
----- Message from Lucía Condon sent at 5/19/2017  1:49 PM CDT -----  Contact: Lorena   tel:   [phone]  Returning your call.   Asking for Faith to return her call.

## 2017-05-19 NOTE — TELEPHONE ENCOUNTER
Spoke to patient and she just wanted to know if Milena would like to change her Rx , I told her I would ask Milena and let her know soon.

## 2017-05-19 NOTE — TELEPHONE ENCOUNTER
Spoke to patient and she said the Novolog 70/30 isn't getting her BG down , she said she been in the 300 to 400. She said she didn't remember what she ate last night, and nothing yet this morning. She isn't taking any steroid or antibiotics.

## 2017-05-19 NOTE — TELEPHONE ENCOUNTER
----- Message from Lucía Condon sent at 5/18/2017  3:34 PM CDT -----  Contact: Lorena   tel:   [phone]  Pt.says the last two times she has called you, she has not received a return call.      Pt. Asking you to pls call her back today since the medication is not working, and her sugars are staying at 400.    Asks that you please call her today. As per pt..

## 2017-05-23 ENCOUNTER — TELEPHONE (OUTPATIENT)
Dept: ENDOCRINOLOGY | Facility: CLINIC | Age: 67
End: 2017-05-23

## 2017-05-23 NOTE — TELEPHONE ENCOUNTER
----- Message from Natasha Sandoval DNP, NP sent at 5/19/2017  4:30 PM CDT -----  Regarding: Call regarding BG readings  Call patient to obtain BG readings.

## 2017-08-26 DIAGNOSIS — I15.2 HYPERTENSION ASSOCIATED WITH DIABETES: ICD-10-CM

## 2017-08-26 DIAGNOSIS — E11.59 HYPERTENSION ASSOCIATED WITH DIABETES: ICD-10-CM

## 2017-08-28 RX ORDER — ATENOLOL 50 MG/1
TABLET ORAL
Qty: 180 TABLET | Refills: 2 | Status: SHIPPED | OUTPATIENT
Start: 2017-08-28 | End: 2017-08-29 | Stop reason: SDUPTHER

## 2017-08-29 DIAGNOSIS — E11.59 HYPERTENSION ASSOCIATED WITH DIABETES: ICD-10-CM

## 2017-08-29 DIAGNOSIS — I15.2 HYPERTENSION ASSOCIATED WITH DIABETES: ICD-10-CM

## 2017-08-29 NOTE — TELEPHONE ENCOUNTER
Dr Luu pt is trying to get tenomin refilled, it is on back order. [facility] said they can fill a 30day supply if you send rx over and pt. Is willing to come pick it up   please advise  Sona

## 2017-08-29 NOTE — TELEPHONE ENCOUNTER
----- Message from Yaneth Pablo MA sent at 8/29/2017 12:03 PM CDT -----  Contact: self - [phone]  The Rx for atenolol (TENORMIN) 50 MG tablet is on back order and the patient stated she is out of all medication. Please call. Thanks!     Pharmacy: [facility] [place], [address]

## 2017-08-30 RX ORDER — ATENOLOL 50 MG/1
50 TABLET ORAL 2 TIMES DAILY
Qty: 180 TABLET | Refills: 2 | Status: SHIPPED | OUTPATIENT
Start: 2017-08-30 | End: 2018-08-24

## 2017-09-01 DIAGNOSIS — Z79.4 UNCONTROLLED TYPE 2 DIABETES MELLITUS WITH HYPERGLYCEMIA, WITH LONG-TERM CURRENT USE OF INSULIN: ICD-10-CM

## 2017-09-01 DIAGNOSIS — E55.9 VITAMIN D INSUFFICIENCY: ICD-10-CM

## 2017-09-01 DIAGNOSIS — E11.65 UNCONTROLLED TYPE 2 DIABETES MELLITUS WITH HYPERGLYCEMIA, WITH LONG-TERM CURRENT USE OF INSULIN: ICD-10-CM

## 2017-09-01 RX ORDER — METFORMIN HYDROCHLORIDE 1000 MG/1
TABLET ORAL
Qty: 180 TABLET | Refills: 3 | Status: SHIPPED | OUTPATIENT
Start: 2017-09-01 | End: 2018-09-12 | Stop reason: SDUPTHER

## 2017-09-01 RX ORDER — ERGOCALCIFEROL 1.25 MG/1
CAPSULE ORAL
Qty: 12 CAPSULE | Refills: 3 | Status: SHIPPED | OUTPATIENT
Start: 2017-09-01 | End: 2018-08-31

## 2017-09-01 RX ORDER — GLIPIZIDE 10 MG/1
TABLET ORAL
Qty: 180 TABLET | Refills: 3 | OUTPATIENT
Start: 2017-09-01

## 2017-09-01 NOTE — TELEPHONE ENCOUNTER
I have filled 2 of the requested medications.  I have not filled glipizide as this was discontinued by the patient's diabetes team.

## 2017-09-05 ENCOUNTER — LAB VISIT (OUTPATIENT)
Dept: LAB | Facility: HOSPITAL | Age: 67
End: 2017-09-05
Attending: NURSE PRACTITIONER
Payer: MEDICARE

## 2017-09-05 DIAGNOSIS — E11.42 DIABETIC PERIPHERAL NEUROPATHY ASSOCIATED WITH TYPE 2 DIABETES MELLITUS: Chronic | ICD-10-CM

## 2017-09-05 LAB
ESTIMATED AVG GLUCOSE: 298 MG/DL
HBA1C MFR BLD HPLC: 12 %

## 2017-09-05 PROCEDURE — 36415 COLL VENOUS BLD VENIPUNCTURE: CPT

## 2017-09-05 PROCEDURE — 83036 HEMOGLOBIN GLYCOSYLATED A1C: CPT

## 2017-09-06 DIAGNOSIS — I15.2 HYPERTENSION ASSOCIATED WITH DIABETES: ICD-10-CM

## 2017-09-06 DIAGNOSIS — Z79.4 UNCONTROLLED TYPE 2 DIABETES MELLITUS WITH HYPERGLYCEMIA, WITH LONG-TERM CURRENT USE OF INSULIN: ICD-10-CM

## 2017-09-06 DIAGNOSIS — E11.59 HYPERTENSION ASSOCIATED WITH DIABETES: ICD-10-CM

## 2017-09-06 DIAGNOSIS — E11.65 UNCONTROLLED TYPE 2 DIABETES MELLITUS WITH HYPERGLYCEMIA, WITH LONG-TERM CURRENT USE OF INSULIN: ICD-10-CM

## 2017-09-06 RX ORDER — GLIPIZIDE 10 MG/1
TABLET ORAL
Qty: 180 TABLET | Refills: 3 | OUTPATIENT
Start: 2017-09-06

## 2017-09-07 RX ORDER — HYDROCHLOROTHIAZIDE 25 MG/1
TABLET ORAL
Qty: 90 TABLET | Refills: 1 | Status: SHIPPED | OUTPATIENT
Start: 2017-09-07 | End: 2018-03-13 | Stop reason: SDUPTHER

## 2017-09-12 ENCOUNTER — OFFICE VISIT (OUTPATIENT)
Dept: ENDOCRINOLOGY | Facility: CLINIC | Age: 67
End: 2017-09-12
Payer: MEDICARE

## 2017-09-12 VITALS
BODY MASS INDEX: 32.44 KG/M2 | HEIGHT: 67 IN | WEIGHT: 206.69 LBS | SYSTOLIC BLOOD PRESSURE: 127 MMHG | HEART RATE: 72 BPM | DIASTOLIC BLOOD PRESSURE: 84 MMHG

## 2017-09-12 DIAGNOSIS — E66.9 OBESITY (BMI 30-39.9): ICD-10-CM

## 2017-09-12 DIAGNOSIS — E11.59 HYPERTENSION ASSOCIATED WITH DIABETES: Chronic | ICD-10-CM

## 2017-09-12 DIAGNOSIS — E11.42 DIABETIC PERIPHERAL NEUROPATHY ASSOCIATED WITH TYPE 2 DIABETES MELLITUS: Primary | Chronic | ICD-10-CM

## 2017-09-12 DIAGNOSIS — E11.21 DIABETIC NEPHROPATHY ASSOCIATED WITH TYPE 2 DIABETES MELLITUS: ICD-10-CM

## 2017-09-12 DIAGNOSIS — I15.2 HYPERTENSION ASSOCIATED WITH DIABETES: Chronic | ICD-10-CM

## 2017-09-12 DIAGNOSIS — E78.5 HYPERLIPIDEMIA ASSOCIATED WITH TYPE 2 DIABETES MELLITUS: Chronic | ICD-10-CM

## 2017-09-12 DIAGNOSIS — E11.69 HYPERLIPIDEMIA ASSOCIATED WITH TYPE 2 DIABETES MELLITUS: Chronic | ICD-10-CM

## 2017-09-12 DIAGNOSIS — F41.9 ANXIETY: ICD-10-CM

## 2017-09-12 PROCEDURE — 3008F BODY MASS INDEX DOCD: CPT | Mod: S$GLB,,, | Performed by: NURSE PRACTITIONER

## 2017-09-12 PROCEDURE — 3074F SYST BP LT 130 MM HG: CPT | Mod: S$GLB,,, | Performed by: NURSE PRACTITIONER

## 2017-09-12 PROCEDURE — 1159F MED LIST DOCD IN RCRD: CPT | Mod: S$GLB,,, | Performed by: NURSE PRACTITIONER

## 2017-09-12 PROCEDURE — 3046F HEMOGLOBIN A1C LEVEL >9.0%: CPT | Mod: S$GLB,,, | Performed by: NURSE PRACTITIONER

## 2017-09-12 PROCEDURE — 99999 PR PBB SHADOW E&M-EST. PATIENT-LVL IV: CPT | Mod: PBBFAC,,, | Performed by: NURSE PRACTITIONER

## 2017-09-12 PROCEDURE — 99214 OFFICE O/P EST MOD 30 MIN: CPT | Mod: S$GLB,,, | Performed by: NURSE PRACTITIONER

## 2017-09-12 PROCEDURE — 3079F DIAST BP 80-89 MM HG: CPT | Mod: S$GLB,,, | Performed by: NURSE PRACTITIONER

## 2017-09-12 PROCEDURE — 1126F AMNT PAIN NOTED NONE PRSNT: CPT | Mod: S$GLB,,, | Performed by: NURSE PRACTITIONER

## 2017-09-12 RX ORDER — GLIPIZIDE 5 MG/1
5 TABLET ORAL
Qty: 60 TABLET | Refills: 11 | Status: SHIPPED | OUTPATIENT
Start: 2017-09-12 | End: 2017-11-29 | Stop reason: SDUPTHER

## 2017-09-12 RX ORDER — INSULIN ASPART 100 [IU]/ML
INJECTION, SUSPENSION SUBCUTANEOUS
Qty: 1 BOX | Refills: 11
Start: 2017-09-12 | End: 2017-11-29 | Stop reason: SDUPTHER

## 2017-09-12 NOTE — PROGRESS NOTES
CC: Management of Type 2 diabetes and review of chronic medical conditions as listed in the visit diagnosis     HPI: Mrs. Lorena Contreras is a 66 y.o. White female who was diagnosed with Type 2 in 2010 based on labwork. She was on Glipizide, but this was discontinued r/t recurrent hypoglycemia.  She was on MDI and Metformin, but was unable to afford analog basal-bolus therapy. She has been on Januvia in the past, but it was discontinued when she was in the coverage gap. She attempted Victoza, but she experienced severe nausea and abdominal pain. Switched to mixed analog insulin in May 2017. No previous history of pancreatitis, pancreatic cancer, or thyroid cancer.     At her last visit she was changed to Novolog 70/30. A1C has trended up. Of note, there has been treatment failure with all previous therapies tried: see above HPI.     Reports increased stress since last visit and also states that her spouse is sick as well. States her anxiety is out of control and she is having difficulty avoiding excessive worry and hasn't been able to socially interact with others much.     Checks BG readings 2x/day and takes insulin twice daily. Readings are 170s-400s. No hypoglycemia.     States she sometimes misses doses of her insulin, but not often.     Doesn't eat breakfast, but eats lunch and dinner. Drinks coffee in the morning only. Has fruit at times for snack (oranges, grapes, strawberries).     Rotates insulin injection sites within abdomen.     No exercise.     CURRENT DIABETIC MEDS: Metformin 1,000 mg bid, Novolog 70/30: 18 units before lunch and dinner  Uses Vials or Pens: Pens  Type of Glucose Meter: True Result    Last Eye Exam: 2017  Last Podiatry Exam: None    REVIEW OF SYSTEMS  General: no weakness; (+) chronic fatigue.   Eyes: no blurry vision, eye pain, or discharge.    Cardiac: no chest pain or palpitations.   Respiratory: no cough or dyspnea.   GI: no abdominal pain, diarrhea, constipation; no current  "nausea  Skin: no rashes, wounds, or bruising; no insulin injection site reactions.   Neuro: feet with numbness and tingling; reports insomnia, which she takes Ambien for, but it isn't effective  Endocrine: no polyuria, polydipsia, polyphagia; (+) nocturia every 2 hours.    Vital Signs  /84 (BP Location: Right arm, Patient Position: Sitting)   Pulse 72   Ht 5' 7" (1.702 m)   Wt 93.8 kg (206 lb 11.2 oz)   BMI 32.37 kg/m²     Hemoglobin A1C   Date Value Ref Range Status   09/05/2017 12.0 (H) 4.0 - 5.6 % Final     Comment:     According to ADA guidelines, hemoglobin A1c <7.0% represents  optimal control in non-pregnant diabetic patients. Different  metrics may apply to specific patient populations.   Standards of Medical Care in Diabetes-2016.  For the purpose of screening for the presence of diabetes:  <5.7%     Consistent with the absence of diabetes  5.7-6.4%  Consistent with increasing risk for diabetes   (prediabetes)  >or=6.5%  Consistent with diabetes  Currently, no consensus exists for use of hemoglobin A1c  for diagnosis of diabetes for children.  This Hemoglobin A1c assay has significant interference with fetal   hemoglobin   (HbF). The results are invalid for patients with abnormal amounts of   HbF,   including those with known Hereditary Persistence   of Fetal Hemoglobin. Heterozygous hemoglobin variants (HbAS, HbAC,   HbAD, HbAE, HbA2) do not significantly interfere with this assay;   however, presence of multiple variants in a sample may impact the %   interference.     05/01/2017 10.4 (H) 4.5 - 6.2 % Final     Comment:     According to ADA guidelines, hemoglobin A1C <7.0% represents  optimal control in non-pregnant diabetic patients.  Different  metrics may apply to specific populations.   Standards of Medical Care in Diabetes - 2016.  For the purpose of screening for the presence of diabetes:  <5.7%     Consistent with the absence of diabetes  5.7-6.4%  Consistent with increasing risk for " diabetes   (prediabetes)  >or=6.5%  Consistent with diabetes  Currently no consensus exists for use of hemoglobin A1C  for diagnosis of diabetes for children.     02/02/2017 9.6 (H) 4.5 - 6.2 % Final     Comment:     According to ADA guidelines, hemoglobin A1C <7.0% represents  optimal control in non-pregnant diabetic patients.  Different  metrics may apply to specific populations.   Standards of Medical Care in Diabetes - 2016.  For the purpose of screening for the presence of diabetes:  <5.7%     Consistent with the absence of diabetes  5.7-6.4%  Consistent with increasing risk for diabetes   (prediabetes)  >or=6.5%  Consistent with diabetes  Currently no consensus exists for use of hemoglobin A1C  for diagnosis of diabetes for children.         Chemistry        Component Value Date/Time     05/01/2017 1015    K 5.4 (H) 05/01/2017 1015     05/01/2017 1015    CO2 26 05/01/2017 1015    BUN 28 (H) 05/01/2017 1015    CREATININE 1.2 05/01/2017 1015     (H) 05/01/2017 1015        Component Value Date/Time    CALCIUM 9.9 05/01/2017 1015    ALKPHOS 125 01/14/2016 1040    AST 47 (H) 01/14/2016 1040    ALT 47 (H) 01/14/2016 1040    BILITOT 0.3 01/14/2016 1040          Lab Results   Component Value Date    CHOL 203 (H) 10/28/2016    CHOL 214 (H) 08/22/2016    CHOL 212 (H) 10/29/2015     Lab Results   Component Value Date    HDL 42 10/28/2016    HDL 41 08/22/2016    HDL 40 10/29/2015     Lab Results   Component Value Date    LDLCALC 128.8 10/28/2016    LDLCALC 124.2 08/22/2016    LDLCALC 137.4 10/29/2015     Lab Results   Component Value Date    TRIG 161 (H) 10/28/2016    TRIG 244 (H) 08/22/2016    TRIG 173 (H) 10/29/2015     Lab Results   Component Value Date    CHOLHDL 20.7 10/28/2016    CHOLHDL 19.2 (L) 08/22/2016    CHOLHDL 18.9 (L) 10/29/2015     Lab Results   Component Value Date    TSH 0.782 10/28/2016     Lab Results   Component Value Date    MICALBCREAT 133.9 (H) 09/05/2017      Assessment/Plan  Diabetic peripheral neuropathy associated with type 2 diabetes mellitus   DM uncontrolled  Unable to afford MDI  Novolog 70/30 : 5 units with coffee, 24 units with lunch and dinner (30% increase in insulin)  Resume Glipizide 5 mg bid (aware of using CUMMINGS with insulin; BG markedly elevated)  Continue Metformin  Finances prohibit use of GLP-1 agonist, SGLT-2 inhibitors, DPP-IV inhibitors. Income ~$430/month  Monitor BG 4x/day and bring logs to next visit  Despite noted allergy to Relion 70/30, she tolerated Levemir and Novolog well.     Diabetic Nephropathy Associated with Type 2 Diabetes  Proteinuria has improved since last visit  Improve BG readings    Essential hypertension  Stable  Continue Amlodipine, Atenolol, and HCTZ  Managed by PCP    Hyperlipidemia  Has never been on treatment and refuses any medications  Discussed risk associated with uncontrolled lipids (CVA, MI)    Obesity, Body mass index is 32.37 kg/m².   Can worsen insulin resistance  Weight loss can help; 7lb weight loss since last visit with me    Anxiety  Continue Prozac, but she states this isn't working  Patient doesn't feel like she can go up to 10 mg dose of Prozac  States she is able to feel anxiety building up and reports she has several factors r/t increasing her anxiety  Managed by PCP--will message for additional options, possible Psychiatry    FOLLOW UP  Return in about 3 months (around 12/12/2017).

## 2017-09-12 NOTE — Clinical Note
Karthikeyan Luu. Please see my latest clinical encounter with Mrs. Contreras. I am concerned with her anxiety. Per our conversation, it seems to be uncontrolled. At this point, it's impacting her care, especially in terms of her uncontrolled diabetes. Options are limited with medications because of finances. I'm thinking maybe Psychiatry can help? But I would like to defer to you. Thanks

## 2017-09-18 ENCOUNTER — TELEPHONE (OUTPATIENT)
Dept: INTERNAL MEDICINE | Facility: CLINIC | Age: 67
End: 2017-09-18

## 2017-09-18 NOTE — TELEPHONE ENCOUNTER
Please call the patient to notify that I would like her to be seen in the next 1-2 weeks to review her symptoms of anxiety. Please schedule her a visit, okay to overbook.

## 2017-09-19 NOTE — TELEPHONE ENCOUNTER
Called and left a mssg for pt to call us back to get schd in about a week or two to see Dr Luu   We need to over book pt

## 2017-10-06 DIAGNOSIS — E11.59 HYPERTENSION ASSOCIATED WITH DIABETES: ICD-10-CM

## 2017-10-06 DIAGNOSIS — F41.9 ANXIETY: ICD-10-CM

## 2017-10-06 DIAGNOSIS — I15.2 HYPERTENSION ASSOCIATED WITH DIABETES: ICD-10-CM

## 2017-10-06 RX ORDER — AMLODIPINE BESYLATE 5 MG/1
TABLET ORAL
Qty: 90 TABLET | Refills: 3 | OUTPATIENT
Start: 2017-10-06

## 2017-10-06 RX ORDER — FLUOXETINE 10 MG/1
TABLET ORAL
Qty: 90 TABLET | Refills: 3 | Status: SHIPPED | OUTPATIENT
Start: 2017-10-06 | End: 2018-02-01 | Stop reason: SDUPTHER

## 2017-10-06 RX ORDER — AMLODIPINE BESYLATE 10 MG/1
10 TABLET ORAL DAILY
Qty: 90 TABLET | Refills: 3 | Status: SHIPPED | OUTPATIENT
Start: 2017-10-06 | End: 2018-12-11 | Stop reason: SDUPTHER

## 2017-10-25 ENCOUNTER — OFFICE VISIT (OUTPATIENT)
Dept: INTERNAL MEDICINE | Facility: CLINIC | Age: 67
End: 2017-10-25
Payer: MEDICARE

## 2017-10-25 VITALS
HEIGHT: 70 IN | SYSTOLIC BLOOD PRESSURE: 138 MMHG | WEIGHT: 206.81 LBS | TEMPERATURE: 99 F | BODY MASS INDEX: 29.61 KG/M2 | DIASTOLIC BLOOD PRESSURE: 78 MMHG | HEART RATE: 94 BPM | OXYGEN SATURATION: 98 %

## 2017-10-25 DIAGNOSIS — B36.9 FUNGAL DERMATITIS: Primary | ICD-10-CM

## 2017-10-25 PROCEDURE — 99213 OFFICE O/P EST LOW 20 MIN: CPT | Mod: S$GLB,,, | Performed by: INTERNAL MEDICINE

## 2017-10-25 PROCEDURE — 99999 PR PBB SHADOW E&M-EST. PATIENT-LVL III: CPT | Mod: PBBFAC,,, | Performed by: INTERNAL MEDICINE

## 2017-10-25 RX ORDER — TOLNAFTATE 10 MG/G
CREAM TOPICAL 2 TIMES DAILY
Refills: 0 | COMMUNITY
Start: 2017-10-25 | End: 2021-07-21
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
## 2017-11-18 DIAGNOSIS — G47.00 INSOMNIA, UNSPECIFIED TYPE: ICD-10-CM

## 2017-11-19 RX ORDER — ZOLPIDEM TARTRATE 5 MG/1
TABLET ORAL
Qty: 30 TABLET | Refills: 2 | Status: SHIPPED | OUTPATIENT
Start: 2017-11-19 | End: 2018-08-31

## 2017-11-29 DIAGNOSIS — E11.42 DIABETIC PERIPHERAL NEUROPATHY ASSOCIATED WITH TYPE 2 DIABETES MELLITUS: Chronic | ICD-10-CM

## 2017-11-29 NOTE — TELEPHONE ENCOUNTER
----- Message from Catie Jhaveri sent at 11/29/2017  3:39 PM CST -----  Contact: Rochelle, from [employer]  Rochelle is calling to get clarity on pts medication.  She can be reached at [phone]

## 2017-11-29 NOTE — TELEPHONE ENCOUNTER
Spoke to Mrs. Byrd from [facility] and she would like us to re send in the Rx Novolog 70-30 due to it doesn't seem to be sent to the pharmacy per Mrs. Byrd. I told her I would send the Rx to [facility]. She also asked if VY Sandoval could give her a 90 Rx Glipizide, I told her I don't think she would mind and that I would send VY Sandoval the Rx to be signed. And last she would like to know if VY Sandoval could order her Diabetes Education, due to she doesn't think that the patient has been to it before.

## 2017-11-30 RX ORDER — INSULIN ASPART 100 [IU]/ML
INJECTION, SUSPENSION SUBCUTANEOUS
Qty: 1 BOX | Refills: 11 | Status: SHIPPED | OUTPATIENT
Start: 2017-11-30 | End: 2018-08-31

## 2017-11-30 RX ORDER — GLIPIZIDE 5 MG/1
5 TABLET ORAL
Qty: 180 TABLET | Refills: 3 | Status: SHIPPED | OUTPATIENT
Start: 2017-11-30 | End: 2018-08-31 | Stop reason: SINTOL

## 2017-12-05 ENCOUNTER — LAB VISIT (OUTPATIENT)
Dept: LAB | Facility: HOSPITAL | Age: 67
End: 2017-12-05
Attending: NURSE PRACTITIONER
Payer: MEDICARE

## 2017-12-05 DIAGNOSIS — E11.42 DIABETIC PERIPHERAL NEUROPATHY ASSOCIATED WITH TYPE 2 DIABETES MELLITUS: Chronic | ICD-10-CM

## 2017-12-05 LAB
CHOLEST SERPL-MCNC: 201 MG/DL
CHOLEST/HDLC SERPL: 5.3 {RATIO}
HDLC SERPL-MCNC: 38 MG/DL
HDLC SERPL: 18.9 %
LDLC SERPL CALC-MCNC: 115.2 MG/DL
NONHDLC SERPL-MCNC: 163 MG/DL
TRIGL SERPL-MCNC: 239 MG/DL
TSH SERPL DL<=0.005 MIU/L-ACNC: 0.67 UIU/ML

## 2017-12-05 PROCEDURE — 36415 COLL VENOUS BLD VENIPUNCTURE: CPT

## 2017-12-05 PROCEDURE — 83036 HEMOGLOBIN GLYCOSYLATED A1C: CPT

## 2017-12-05 PROCEDURE — 84443 ASSAY THYROID STIM HORMONE: CPT

## 2017-12-05 PROCEDURE — 80061 LIPID PANEL: CPT

## 2017-12-06 LAB
ESTIMATED AVG GLUCOSE: 278 MG/DL
HBA1C MFR BLD HPLC: 11.3 %

## 2018-02-01 ENCOUNTER — OFFICE VISIT (OUTPATIENT)
Dept: INTERNAL MEDICINE | Facility: CLINIC | Age: 68
End: 2018-02-01
Payer: MEDICARE

## 2018-02-01 ENCOUNTER — LAB VISIT (OUTPATIENT)
Dept: LAB | Facility: HOSPITAL | Age: 68
End: 2018-02-01
Attending: INTERNAL MEDICINE
Payer: MEDICARE

## 2018-02-01 VITALS
WEIGHT: 200.63 LBS | BODY MASS INDEX: 28.72 KG/M2 | HEART RATE: 88 BPM | SYSTOLIC BLOOD PRESSURE: 180 MMHG | HEIGHT: 70 IN | DIASTOLIC BLOOD PRESSURE: 70 MMHG

## 2018-02-01 DIAGNOSIS — K21.9 GASTROESOPHAGEAL REFLUX DISEASE, ESOPHAGITIS PRESENCE NOT SPECIFIED: ICD-10-CM

## 2018-02-01 DIAGNOSIS — M79.7 FIBROMYALGIA: ICD-10-CM

## 2018-02-01 DIAGNOSIS — Z23 NEED FOR 23-POLYVALENT PNEUMOCOCCAL POLYSACCHARIDE VACCINE: ICD-10-CM

## 2018-02-01 DIAGNOSIS — F41.9 ANXIETY: ICD-10-CM

## 2018-02-01 DIAGNOSIS — M79.89 LEG SWELLING: ICD-10-CM

## 2018-02-01 DIAGNOSIS — E11.29 PERSISTENT MICROALBUMINURIA ASSOCIATED WITH TYPE 2 DIABETES MELLITUS: Chronic | ICD-10-CM

## 2018-02-01 DIAGNOSIS — R79.0 LOW MAGNESIUM LEVEL: ICD-10-CM

## 2018-02-01 DIAGNOSIS — E11.3592 PROLIFERATIVE DIABETIC RETINOPATHY OF LEFT EYE ASSOCIATED WITH TYPE 2 DIABETES MELLITUS, UNSPECIFIED PROLIFERATIVE RETINOPATHY TYPE: Chronic | ICD-10-CM

## 2018-02-01 DIAGNOSIS — R60.0 LEG EDEMA, LEFT: ICD-10-CM

## 2018-02-01 DIAGNOSIS — D50.9 IRON DEFICIENCY ANEMIA, UNSPECIFIED IRON DEFICIENCY ANEMIA TYPE: ICD-10-CM

## 2018-02-01 DIAGNOSIS — R11.0 NAUSEA: ICD-10-CM

## 2018-02-01 DIAGNOSIS — Z12.31 ENCOUNTER FOR SCREENING MAMMOGRAM FOR BREAST CANCER: ICD-10-CM

## 2018-02-01 DIAGNOSIS — M65.341 TRIGGER RING FINGER OF RIGHT HAND: ICD-10-CM

## 2018-02-01 DIAGNOSIS — R80.9 PERSISTENT MICROALBUMINURIA ASSOCIATED WITH TYPE 2 DIABETES MELLITUS: Chronic | ICD-10-CM

## 2018-02-01 DIAGNOSIS — Z00.00 ANNUAL PHYSICAL EXAM: Primary | ICD-10-CM

## 2018-02-01 DIAGNOSIS — Z85.72 HISTORY OF NON-HODGKIN'S LYMPHOMA: ICD-10-CM

## 2018-02-01 DIAGNOSIS — E11.42 DIABETIC PERIPHERAL NEUROPATHY ASSOCIATED WITH TYPE 2 DIABETES MELLITUS: ICD-10-CM

## 2018-02-01 LAB
ALBUMIN SERPL BCP-MCNC: 3.9 G/DL
ALP SERPL-CCNC: 120 U/L
ALT SERPL W/O P-5'-P-CCNC: 53 U/L
ANION GAP SERPL CALC-SCNC: 12 MMOL/L
AST SERPL-CCNC: 56 U/L
BILIRUB SERPL-MCNC: 0.4 MG/DL
BUN SERPL-MCNC: 28 MG/DL
CALCIUM SERPL-MCNC: 10.2 MG/DL
CHLORIDE SERPL-SCNC: 99 MMOL/L
CO2 SERPL-SCNC: 26 MMOL/L
CREAT SERPL-MCNC: 1.3 MG/DL
ERYTHROCYTE [DISTWIDTH] IN BLOOD BY AUTOMATED COUNT: 13.3 %
EST. GFR  (AFRICAN AMERICAN): 49 ML/MIN/1.73 M^2
EST. GFR  (NON AFRICAN AMERICAN): 43 ML/MIN/1.73 M^2
FERRITIN SERPL-MCNC: 57 NG/ML
GLUCOSE SERPL-MCNC: 415 MG/DL
HCT VFR BLD AUTO: 37.3 %
HGB BLD-MCNC: 12.2 G/DL
IRON SERPL-MCNC: 61 UG/DL
MAGNESIUM SERPL-MCNC: 1.4 MG/DL
MCH RBC QN AUTO: 27.6 PG
MCHC RBC AUTO-ENTMCNC: 32.7 G/DL
MCV RBC AUTO: 84 FL
PLATELET # BLD AUTO: 278 K/UL
PMV BLD AUTO: 12.6 FL
POTASSIUM SERPL-SCNC: 4.7 MMOL/L
PROT SERPL-MCNC: 8 G/DL
RBC # BLD AUTO: 4.42 M/UL
SATURATED IRON: 13 %
SODIUM SERPL-SCNC: 137 MMOL/L
TOTAL IRON BINDING CAPACITY: 469 UG/DL
TRANSFERRIN SERPL-MCNC: 317 MG/DL
WBC # BLD AUTO: 12.82 K/UL

## 2018-02-01 PROCEDURE — 83540 ASSAY OF IRON: CPT

## 2018-02-01 PROCEDURE — 85027 COMPLETE CBC AUTOMATED: CPT

## 2018-02-01 PROCEDURE — 80053 COMPREHEN METABOLIC PANEL: CPT

## 2018-02-01 PROCEDURE — 90732 PPSV23 VACC 2 YRS+ SUBQ/IM: CPT | Mod: S$GLB,,, | Performed by: INTERNAL MEDICINE

## 2018-02-01 PROCEDURE — 83735 ASSAY OF MAGNESIUM: CPT

## 2018-02-01 PROCEDURE — 99499 UNLISTED E&M SERVICE: CPT | Mod: S$GLB,,, | Performed by: INTERNAL MEDICINE

## 2018-02-01 PROCEDURE — 82728 ASSAY OF FERRITIN: CPT

## 2018-02-01 PROCEDURE — 99999 PR PBB SHADOW E&M-EST. PATIENT-LVL IV: CPT | Mod: PBBFAC,,, | Performed by: INTERNAL MEDICINE

## 2018-02-01 PROCEDURE — G0009 ADMIN PNEUMOCOCCAL VACCINE: HCPCS | Mod: S$GLB,,, | Performed by: INTERNAL MEDICINE

## 2018-02-01 PROCEDURE — 99397 PER PM REEVAL EST PAT 65+ YR: CPT | Mod: S$GLB,,, | Performed by: INTERNAL MEDICINE

## 2018-02-01 PROCEDURE — 36415 COLL VENOUS BLD VENIPUNCTURE: CPT

## 2018-02-01 RX ORDER — FUROSEMIDE 20 MG/1
20 TABLET ORAL DAILY PRN
Qty: 90 TABLET | Refills: 3 | Status: SHIPPED | OUTPATIENT
Start: 2018-02-01 | End: 2020-06-29 | Stop reason: SDUPTHER

## 2018-02-01 RX ORDER — PEN NEEDLE, DIABETIC 30 GX3/16"
NEEDLE, DISPOSABLE MISCELLANEOUS
Qty: 400 EACH | Refills: 3 | Status: SHIPPED | OUTPATIENT
Start: 2018-02-01 | End: 2018-11-30 | Stop reason: SDUPTHER

## 2018-02-01 RX ORDER — HYDROGEN PEROXIDE 3 %
20 SOLUTION, NON-ORAL MISCELLANEOUS
COMMUNITY
End: 2020-03-06

## 2018-02-01 RX ORDER — ONDANSETRON 4 MG/1
4 TABLET, ORALLY DISINTEGRATING ORAL EVERY 8 HOURS PRN
Qty: 20 TABLET | Refills: 3 | Status: SHIPPED | OUTPATIENT
Start: 2018-02-01 | End: 2018-08-31

## 2018-02-01 RX ORDER — FLUOXETINE 10 MG/1
10 TABLET ORAL DAILY
Qty: 90 TABLET | Refills: 3 | Status: SHIPPED | OUTPATIENT
Start: 2018-02-01 | End: 2018-02-27 | Stop reason: SDUPTHER

## 2018-02-01 NOTE — PROGRESS NOTES
Subjective:       Patient ID: Lorena Contreras is a 67 y.o. female.    Chief Complaint: Annual Exam    HPI    Last visit with me March 2017.  Since then seen by Endocrinology and Internal Medicine.  Pt is overdue for follow up with Endocrinology.       Reports nausea and RUQ, also with GERD symptoms.  Right hand was having trigger symptoms, improved following injection from orthopedics but now recurring.    Patient continues to have diffuse pain in multiple muscles from fibromyalgia.    Reports anxiety is persistent, she ran out of Prozac and has not had any refills.    Reviewed PMH, PSH, SH, FH, allergies, and medications.     Review of Systems   All other systems reviewed and are negative.      Objective:      Physical Exam   Constitutional: She is oriented to person, place, and time. No distress.    woman whose Body mass index is 28.79 kg/m².    HENT:   Head: Atraumatic.   Right Ear: No foreign bodies. Tympanic membrane is not injected and not erythematous. A middle ear effusion (serous) is present.   Left Ear: No foreign bodies. Tympanic membrane is not injected and not erythematous. A middle ear effusion (serous) is present.   Mouth/Throat: Oropharynx is clear and moist. No oropharyngeal exudate.   Eyes: Pupils are equal, round, and reactive to light. Right eye exhibits no discharge. Left eye exhibits no discharge.   Neck: Normal range of motion. No thyromegaly present.   Cardiovascular: Normal rate, regular rhythm and normal heart sounds.    Pulmonary/Chest: Effort normal and breath sounds normal. No stridor. She has no wheezes. She has no rales.   Abdominal: Soft. She exhibits no distension and no mass. There is no tenderness. There is no guarding.   Musculoskeletal: She exhibits no edema or tenderness.   Lymphadenopathy:     She has no cervical adenopathy.   Neurological: She is alert and oriented to person, place, and time.   Skin: Skin is warm and dry. No rash noted.   Psychiatric: Her  "behavior is normal. Her mood appears anxious.   Nursing note and vitals reviewed.      Vitals:    02/01/18 0904   BP: (!) 180/70   BP Location: Right arm   Patient Position: Sitting   BP Method: Large (Manual)   Pulse: 88   Weight: 91 kg (200 lb 9.9 oz)   Height: 5' 10" (1.778 m)     Body mass index is 28.79 kg/m².    RESULTS: Reviewed labs from last 12 months    Assessment:       1. Annual physical exam    2. Leg swelling    3. Need for 23-polyvalent pneumococcal polysaccharide vaccine    4. Diabetic peripheral neuropathy associated with type 2 diabetes mellitus    5. Iron deficiency anemia, unspecified iron deficiency anemia type    6. Nausea    7. Low magnesium level    8. Anxiety    9. Uncontrolled type 2 diabetes mellitus with diabetic polyneuropathy, with long-term current use of insulin    10. History of non-Hodgkin's lymphoma    11. Gastroesophageal reflux disease, esophagitis presence not specified    12. Encounter for screening mammogram for breast cancer    13. Proliferative diabetic retinopathy of left eye associated with type 2 diabetes mellitus, unspecified proliferative retinopathy type    14. Fibromyalgia    15. Persistent microalbuminuria associated with type 2 diabetes mellitus    16. Leg edema, left    17. Trigger ring finger of right hand        Plan:   Lorena was seen today for annual exam.    Diagnoses and all orders for this visit:    Annual physical exam:  Age-appropriate health screening reviewed, indicated tests ordered. Defer colonoscopy at this time, pt has many other financial concerns.    Leg swelling:  Mostly on LLE, started going on after knee surgery. well controlled with Lasix PRN.  -     furosemide (LASIX) 20 MG tablet; Take 1 tablet (20 mg total) by mouth daily as needed (leg swelling).    Need for 23-polyvalent pneumococcal polysaccharide vaccine    Diabetic peripheral neuropathy associated with type 2 diabetes mellitus:  Continues to have pain, discussed with patient the " "importance of controlling blood sugars to help limit neuropathic pain.  -     pen needle, diabetic 32 gauge x 5/32" Ndle; Use with Novolog and Levemir Flexpens    Iron deficiency anemia, unspecified iron deficiency anemia type  -     CBC Without Differential; Future  -     Ferritin; Future  -     Iron and TIBC; Future    Nausea:  New problem, getting worse.  At this time I believe it is related to uncontrolled GERD, though she is possibly having gastroparesis related to uncontrolled diabetes.  Increase use of over-the-counter Nexium from once daily to 2 times a day with meals.  -     Comprehensive metabolic panel; Future  -     ondansetron (ZOFRAN-ODT) 4 MG TbDL; Take 1 tablet (4 mg total) by mouth every 8 (eight) hours as needed.    Low magnesium level:  Prior diagnosis, not controlled, patient still having leg cramps. Likely having losses from increased urinary frequency given uncontrolled diabetes.  Continue use of over-the-counter magnesium supplement and attempt better blood sugar control.  -     Magnesium; Future    Anxiety:  Ran out of Prozac, wasn't refilled, unclear why the office was contacted.  Restart medication.  -     FLUoxetine 10 MG Tab; Take 1 tablet (10 mg total) by mouth once daily.    Uncontrolled type 2 diabetes mellitus with diabetic polyneuropathy, with long-term current use of insulin    History of non-Hodgkin's lymphoma  -     Ambulatory consult to Oncology    Gastroesophageal reflux disease, esophagitis presence not specified:  Increase Nexium over-the-counter to two times a day with meals.    Encounter for screening mammogram for breast cancer  -     Mammo Digital Screening Bilat with CAD; Future    Proliferative diabetic retinopathy of left eye associated with type 2 diabetes mellitus, unspecified proliferative retinopathy type:  Patient is due for follow-up with outside optometry, however she reports she has not been going because she has having too many financial constraints and is " unable to afford the appointment and surgery.    Fibromyalgia:  Ongoing problem, uncontrolled and getting worse.  Likely contributions from uncontrolled depression and anxiety as well as insomnia.  We will need to address this further at future visits.    Persistent microalbuminuria associated with type 2 diabetes mellitus:  Uncontrolled, she is not on ACE inhibitor or arm at this time.  Continue attempts at blood sugar control.    Leg edema, left    Trigger ring finger of right hand      Follow-up in about 3 weeks (around 2/22/2018) for NP or PA for blood pressure recheck. Review status of chronic conditions. Try to address anxiety and fibromyalgia.  At some point we'll need to try starting ACE inhibitor or ARB for microalbuminuria.  Kenneth Luu MD  Internal Medicine    Portions of this note were completed using [employer] dictation software. Please excuse typographical or syntax errors.

## 2018-02-01 NOTE — PATIENT INSTRUCTIONS
Please increase over-the-counter Nexium to two times a day before meals (breakfast and dinner) to help reduce acid.    Talk to pharmacy today about getting onto pharmacy assistance plan. Also discuss with Endocrinology.

## 2018-02-06 ENCOUNTER — TELEPHONE (OUTPATIENT)
Dept: HEMATOLOGY/ONCOLOGY | Facility: CLINIC | Age: 68
End: 2018-02-06

## 2018-02-08 ENCOUNTER — TELEPHONE (OUTPATIENT)
Dept: HEMATOLOGY/ONCOLOGY | Facility: CLINIC | Age: 68
End: 2018-02-08

## 2018-02-14 ENCOUNTER — TELEPHONE (OUTPATIENT)
Dept: PULMONOLOGY | Facility: CLINIC | Age: 68
End: 2018-02-14

## 2018-02-27 ENCOUNTER — OFFICE VISIT (OUTPATIENT)
Dept: INTERNAL MEDICINE | Facility: CLINIC | Age: 68
End: 2018-02-27
Payer: MEDICARE

## 2018-02-27 ENCOUNTER — TELEPHONE (OUTPATIENT)
Dept: HEMATOLOGY/ONCOLOGY | Facility: CLINIC | Age: 68
End: 2018-02-27

## 2018-02-27 VITALS
HEART RATE: 92 BPM | OXYGEN SATURATION: 98 % | BODY MASS INDEX: 29.01 KG/M2 | SYSTOLIC BLOOD PRESSURE: 138 MMHG | DIASTOLIC BLOOD PRESSURE: 70 MMHG | WEIGHT: 202.63 LBS | HEIGHT: 70 IN

## 2018-02-27 DIAGNOSIS — E11.42 DIABETIC PERIPHERAL NEUROPATHY ASSOCIATED WITH TYPE 2 DIABETES MELLITUS: Chronic | ICD-10-CM

## 2018-02-27 DIAGNOSIS — I15.2 HYPERTENSION ASSOCIATED WITH DIABETES: Primary | Chronic | ICD-10-CM

## 2018-02-27 DIAGNOSIS — E11.59 HYPERTENSION ASSOCIATED WITH DIABETES: Primary | Chronic | ICD-10-CM

## 2018-02-27 DIAGNOSIS — F41.9 ANXIETY: ICD-10-CM

## 2018-02-27 PROCEDURE — 1159F MED LIST DOCD IN RCRD: CPT | Mod: S$GLB,,, | Performed by: NURSE PRACTITIONER

## 2018-02-27 PROCEDURE — 99999 PR PBB SHADOW E&M-EST. PATIENT-LVL III: CPT | Mod: PBBFAC,,, | Performed by: NURSE PRACTITIONER

## 2018-02-27 PROCEDURE — 3008F BODY MASS INDEX DOCD: CPT | Mod: S$GLB,,, | Performed by: NURSE PRACTITIONER

## 2018-02-27 PROCEDURE — 1126F AMNT PAIN NOTED NONE PRSNT: CPT | Mod: S$GLB,,, | Performed by: NURSE PRACTITIONER

## 2018-02-27 PROCEDURE — 99214 OFFICE O/P EST MOD 30 MIN: CPT | Mod: S$GLB,,, | Performed by: NURSE PRACTITIONER

## 2018-02-27 RX ORDER — FLUOXETINE 20 MG/1
20 TABLET ORAL DAILY
Qty: 90 TABLET | Refills: 1 | Status: SHIPPED | OUTPATIENT
Start: 2018-02-27 | End: 2018-03-01 | Stop reason: CLARIF

## 2018-02-27 NOTE — PROGRESS NOTES
"INTERNAL MEDICINE PROGRESS NOTE    CHIEF COMPLAINT     Chief Complaint   Patient presents with    Follow-up     3 week BP check        HPI     Lorena Contreras is a 67 y.o. female with dm, anxiety, htn, hld, neuropathy, fm, and h/o non-Hodgkin's lymphoma who presents for a 3 week BP follow up visit today.  She is an established pt of Dr. Luu.     Here for 3 week bp follow up. Was seen 2/1/18 Dr. Luu for annual physical.    BP elevated at visit - 180/70. Increased anxiety meds- fluoxetine.     HTN- compliant with amlodipine, lasix, hctz, and atenolol. Not on ace or arb. Took all medication this morning around 10am. Has been watching sodium intake.   ambulatory -140/70-80's     Anxiety- reports "very stressed out". Has a lot of money concerns and recent death of her friend of 35 years.       Past Medical History:  Past Medical History:   Diagnosis Date    Anxiety     Diabetes mellitus, type II     Follicular lymphoma     HTN (hypertension)     Restless leg syndrome        Home Medications:  Prior to Admission medications    Medication Sig Start Date End Date Taking? Authorizing Provider   amlodipine (NORVASC) 10 MG tablet Take 1 tablet (10 mg total) by mouth once daily. 10/6/17 10/6/18  Kenneth Luu MD   atenolol (TENORMIN) 50 MG tablet Take 1 tablet (50 mg total) by mouth 2 (two) times daily. 8/30/17   Kenneth Luu MD   biotin 1,000 mcg Chew Take 1,000 mcg by mouth once daily. Taking 5,000    Historical Provider, MD borrero tribrom-petrolatum,wh (XEROFORM) 5 X 9 " Bndg Apply dressing to wound daily 11/28/16   Kenneth Luu MD   esomeprazole (NEXIUM) 20 MG capsule Take 20 mg by mouth before breakfast.    Historical Provider, MD   ferrous sulfate 325 mg (65 mg iron) Tab tablet Take 325 mg by mouth 2 (two) times daily.     Historical Provider, MD   FLUoxetine 10 MG Tab Take 1 tablet (10 mg total) by mouth once daily. 2/1/18   Kenneth Luu MD   furosemide (LASIX) 20 MG tablet Take 1 tablet (20 " "mg total) by mouth daily as needed (leg swelling). 2/1/18 2/1/19  Kenneth Luu MD   glipiZIDE (GLUCOTROL) 5 MG tablet Take 1 tablet (5 mg total) by mouth 2 (two) times daily before meals. 11/30/17 11/30/18  Natasha aSndoval DNP, NP   hydrochlorothiazide (HYDRODIURIL) 25 MG tablet TAKE ONE TABLET BY MOUTH ONCE DAILY 9/7/17   Kenneth Luu MD   ibuprofen (ADVIL,MOTRIN) 800 MG tablet Take 1 tablet (800 mg total) by mouth 3 (three) times daily as needed for Pain. 3/7/17   Kenneth Luu MD   insulin aspart protamine-insulin aspart (NOVOLOG MIX 70-30 FLEXPEN) 100 unit/mL (70-30) InPn pen Take 24 units subQ twice daily before lunch and dinner; 5 units with coffee in the morning 11/30/17   Natasha Sandoval DNP, NP   lancets 32 gauge Misc 1 lancet by Misc.(Non-Drug; Combo Route) route 4 (four) times daily. 8/23/16   Kenneth Luu MD   magnesium oxide (MAG-OX) 400 mg tablet Take 400 mg by mouth once daily.    Historical Provider, MD   metformin (GLUCOPHAGE) 1000 MG tablet TAKE ONE TABLET BY MOUTH TWICE DAILY WITH MEALS 9/1/17   Kenneth Luu MD   ondansetron (ZOFRAN-ODT) 4 MG TbDL Take 1 tablet (4 mg total) by mouth every 8 (eight) hours as needed. 2/1/18   Kenneth Luu MD   pen needle, diabetic 32 gauge x 5/32" Ndle Use with Novolog and Levemir Flexpens 2/1/18   Kenneth Luu MD   tolnaftate (TINACTIN) 1 % cream Apply topically 2 (two) times daily. 10/25/17   Sandra Ferrell MD   tramadol (ULTRAM) 50 mg tablet Take 1 to 2 tabs every 6 hours as needed for pain. 11/8/16   Kenneth Luu MD   triamcinolone acetonide 0.1% (KENALOG) 0.1 % cream Apply topically 2 (two) times daily as needed. 3/9/17 3/19/17  Kenneth Luu MD   VITAMIN D2 50,000 unit capsule TAKE ONE CAPSULE BY MOUTH EVERY 7 DAYS 9/1/17   Kenneth Luu MD   zolpidem (AMBIEN) 5 MG Tab TAKE ONE TABLET BY MOUTH NIGHTLY AS NEEDED 11/19/17   Kenneth Luu MD       Review of Systems:  Review of Systems   Eyes: Positive for visual disturbance.   Respiratory: " "Negative for cough, shortness of breath and wheezing.    Cardiovascular: Positive for chest pain (on exertion. ). Negative for palpitations.   Neurological: Positive for headaches. Negative for dizziness and light-headedness.   Psychiatric/Behavioral: Negative for agitation and dysphoric mood. The patient is nervous/anxious.        Health Maintainence:   Immunizations:  Health Maintenance       Date Due Completion Date    Zoster Vaccine 10/16/2010 ---    Mammogram 09/18/2017 9/18/2015    Colonoscopy 11/07/2017 11/7/2012 (Done)    Override on 11/7/2012: Done (Polyp found)    Eye Exam 02/07/2018 2/7/2017 (Done)    Override on 2/7/2017: Done    Override on 11/28/2014: Done    Foot Exam 02/14/2018 2/14/2017    Override on 11/18/2016: Done (with Podiatry)    Override on 5/19/2016: Done    Hemoglobin A1c 06/05/2018 12/5/2017    Urine Microalbumin 09/05/2018 9/5/2017    Lipid Panel 12/05/2018 12/5/2017    DEXA SCAN 12/08/2018 12/8/2016    TETANUS VACCINE 11/27/2022 11/27/2012           PHYSICAL EXAM     BP (!) 140/92 (BP Location: Left arm, Patient Position: Sitting, BP Method: Large (Manual)) Comment: meds taken around 10AM  Pulse 92   Ht 5' 10" (1.778 m)   Wt 91.9 kg (202 lb 9.6 oz)   SpO2 98%   BMI 29.07 kg/m²     Physical Exam   Constitutional: She is oriented to person, place, and time. She appears well-developed and well-nourished.   HENT:   Head: Normocephalic.   Right Ear: External ear normal.   Left Ear: External ear normal.   Nose: Nose normal.   Mouth/Throat: Oropharynx is clear and moist. No oropharyngeal exudate.   Eyes: Pupils are equal, round, and reactive to light.   Neck: Neck supple. No JVD present. No tracheal deviation present. No thyromegaly present.   Cardiovascular: Normal rate, regular rhythm, normal heart sounds and intact distal pulses.  Exam reveals no gallop and no friction rub.    No murmur heard.  Pulmonary/Chest: Effort normal and breath sounds normal. No respiratory distress. She has no " wheezes. She has no rales.   Abdominal: Soft. Bowel sounds are normal. She exhibits no distension. There is no tenderness.   Musculoskeletal: She exhibits edema (1+ left lower leg ).        Right knee: She exhibits swelling and effusion. She exhibits normal range of motion, no deformity, no laceration, no erythema, normal alignment and no LCL laxity. Tenderness found.        Left knee: She exhibits swelling and effusion. She exhibits normal range of motion, no deformity, no laceration and no erythema. Tenderness found.   Lymphadenopathy:     She has no cervical adenopathy.   Neurological: She is alert and oriented to person, place, and time.   Skin: Skin is warm and dry. No rash noted.   Psychiatric: She has a normal mood and affect. Her behavior is normal.   Vitals reviewed.      LABS     Lab Results   Component Value Date    HGBA1C 11.3 (H) 12/05/2017     CMP  Sodium   Date Value Ref Range Status   02/01/2018 137 136 - 145 mmol/L Final     Potassium   Date Value Ref Range Status   02/01/2018 4.7 3.5 - 5.1 mmol/L Final     Chloride   Date Value Ref Range Status   02/01/2018 99 95 - 110 mmol/L Final     CO2   Date Value Ref Range Status   02/01/2018 26 23 - 29 mmol/L Final     Glucose   Date Value Ref Range Status   02/01/2018 415 (H) 70 - 110 mg/dL Final     BUN, Bld   Date Value Ref Range Status   02/01/2018 28 (H) 8 - 23 mg/dL Final     Creatinine   Date Value Ref Range Status   02/01/2018 1.3 0.5 - 1.4 mg/dL Final     Calcium   Date Value Ref Range Status   02/01/2018 10.2 8.7 - 10.5 mg/dL Final     Total Protein   Date Value Ref Range Status   02/01/2018 8.0 6.0 - 8.4 g/dL Final     Albumin   Date Value Ref Range Status   02/01/2018 3.9 3.5 - 5.2 g/dL Final     Total Bilirubin   Date Value Ref Range Status   02/01/2018 0.4 0.1 - 1.0 mg/dL Final     Comment:     For infants and newborns, interpretation of results should be based  on gestational age, weight and in agreement with clinical  observations.  Premature  Infant recommended reference ranges:  Up to 24 hours.............<8.0 mg/dL  Up to 48 hours............<12.0 mg/dL  3-5 days..................<15.0 mg/dL  6-29 days.................<15.0 mg/dL       Alkaline Phosphatase   Date Value Ref Range Status   02/01/2018 120 55 - 135 U/L Final     AST   Date Value Ref Range Status   02/01/2018 56 (H) 10 - 40 U/L Final     ALT   Date Value Ref Range Status   02/01/2018 53 (H) 10 - 44 U/L Final     Anion Gap   Date Value Ref Range Status   02/01/2018 12 8 - 16 mmol/L Final     eGFR if    Date Value Ref Range Status   02/01/2018 49 (A) >60 mL/min/1.73 m^2 Final     eGFR if non    Date Value Ref Range Status   02/01/2018 43 (A) >60 mL/min/1.73 m^2 Final     Comment:     Calculation used to obtain the estimated glomerular filtration  rate (eGFR) is the CKD-EPI equation.        Lab Results   Component Value Date    WBC 12.82 (H) 02/01/2018    HGB 12.2 02/01/2018    HCT 37.3 02/01/2018    MCV 84 02/01/2018     02/01/2018     Lab Results   Component Value Date    CHOL 201 (H) 12/05/2017    CHOL 203 (H) 10/28/2016    CHOL 214 (H) 08/22/2016     Lab Results   Component Value Date    HDL 38 (L) 12/05/2017    HDL 42 10/28/2016    HDL 41 08/22/2016     Lab Results   Component Value Date    LDLCALC 115.2 12/05/2017    LDLCALC 128.8 10/28/2016    LDLCALC 124.2 08/22/2016     Lab Results   Component Value Date    TRIG 239 (H) 12/05/2017    TRIG 161 (H) 10/28/2016    TRIG 244 (H) 08/22/2016     Lab Results   Component Value Date    CHOLHDL 18.9 (L) 12/05/2017    CHOLHDL 20.7 10/28/2016    CHOLHDL 19.2 (L) 08/22/2016     Lab Results   Component Value Date    TSH 0.665 12/05/2017       ASSESSMENT/PLAN     Lorena Contreras is a 67 y.o. female with  Past Medical History:   Diagnosis Date    Anxiety     Diabetes mellitus, type II     Follicular lymphoma     HTN (hypertension)     Restless leg syndrome      Hypertension associated with diabetes- at  goal on recheck in office. Will cont current meds. Low Na diet encouraged.     Anxiety- poorly controlled on 10mg will increase prozac to 20mg daily. Will likely help decrease BP  -     FLUoxetine 20 MG tablet; Take 1 tablet (20 mg total) by mouth once daily.  Dispense: 90 tablet; Refill: 1    Diabetic peripheral neuropathy associated with type 2 diabetes mellitus- poorly controlled. Not taking insulin 2/2 high cost of meds. Advised to f/u with financial assitance and will talk with [facility] pharm to inquire about cheaper options.     Uncontrolled type 2 diabetes mellitus with diabetic polyneuropathy, with long-term current use of insulin          Follow up with PCP in 3 months     Patient education provided from Doc. Patient was counseled on when and how to seek emergent care.       Madhuri BERNSTEIN, JAYCEE, FNP-c   Department of Internal Medicine - [facility]  1:57 PM

## 2018-02-27 NOTE — TELEPHONE ENCOUNTER
Attempted to call the pt to schedule her an appointment , there was no answer l/m that I called. Also wanted to ask the pt which  she would like to see.

## 2018-03-01 ENCOUNTER — TELEPHONE (OUTPATIENT)
Dept: INTERNAL MEDICINE | Facility: CLINIC | Age: 68
End: 2018-03-01

## 2018-03-01 RX ORDER — FLUOXETINE HYDROCHLORIDE 20 MG/1
20 CAPSULE ORAL DAILY
Qty: 30 CAPSULE | Refills: 11 | Status: SHIPPED | OUTPATIENT
Start: 2018-03-01 | End: 2019-02-18 | Stop reason: SDUPTHER

## 2018-03-01 NOTE — TELEPHONE ENCOUNTER
----- Message from Dalia Coughlin sent at 3/1/2018 10:52 AM CST -----  Contact: Anthony/Ofelia [phone]  Prescription Clarification:     The pharmacy needs clarity on the following RX:    FLUoxetine 20 MG tablet    Rx not covered by insurance, requesting capsules.    Please call and advise.    Thank You

## 2018-03-13 DIAGNOSIS — R21 SKIN RASH: ICD-10-CM

## 2018-03-13 DIAGNOSIS — I15.2 HYPERTENSION ASSOCIATED WITH DIABETES: ICD-10-CM

## 2018-03-13 DIAGNOSIS — E11.59 HYPERTENSION ASSOCIATED WITH DIABETES: ICD-10-CM

## 2018-03-14 RX ORDER — TRIAMCINOLONE ACETONIDE 1 MG/G
CREAM TOPICAL
Qty: 45 G | Refills: 2 | Status: SHIPPED | OUTPATIENT
Start: 2018-03-14 | End: 2020-09-08

## 2018-03-14 RX ORDER — HYDROCHLOROTHIAZIDE 25 MG/1
TABLET ORAL
Qty: 90 TABLET | Refills: 1 | Status: SHIPPED | OUTPATIENT
Start: 2018-03-14 | End: 2018-09-03 | Stop reason: SDUPTHER

## 2018-03-26 ENCOUNTER — HOSPITAL ENCOUNTER (OUTPATIENT)
Dept: RADIOLOGY | Facility: HOSPITAL | Age: 68
Discharge: HOME | End: 2018-03-26
Attending: INTERNAL MEDICINE
Payer: MEDICARE

## 2018-03-26 DIAGNOSIS — Z12.31 ENCOUNTER FOR SCREENING MAMMOGRAM FOR BREAST CANCER: ICD-10-CM

## 2018-03-26 PROCEDURE — 77067 SCR MAMMO BI INCL CAD: CPT | Mod: TC

## 2018-03-26 PROCEDURE — 77067 SCR MAMMO BI INCL CAD: CPT | Mod: 26,,, | Performed by: RADIOLOGY

## 2018-03-26 PROCEDURE — 77063 BREAST TOMOSYNTHESIS BI: CPT | Mod: 26,,, | Performed by: RADIOLOGY

## 2018-06-07 ENCOUNTER — TELEPHONE (OUTPATIENT)
Dept: ADMINISTRATIVE | Facility: HOSPITAL | Age: 68
End: 2018-06-07

## 2018-06-07 NOTE — TELEPHONE ENCOUNTER
Message from [facility] Nurse:    The patient is non compliant with current ACE/ARB and have not met nephrology measure.    Please consider ordering urine microalbumin in order to satisfy this measure.    Thanks,    Rochelle Diop, RN  RN Navigator  [address], [address]  [place] [address]  Direct Dial Number: [phone]  Main Number: [phone]

## 2018-06-08 NOTE — TELEPHONE ENCOUNTER
Please call the patient to notify that we will be checking a urine sample after her visit next week to ensure there is no kidney inflammation due to diabetes. Drink plenty of water!

## 2018-06-15 ENCOUNTER — TELEPHONE (OUTPATIENT)
Dept: INTERNAL MEDICINE | Facility: CLINIC | Age: 68
End: 2018-06-15

## 2018-06-15 NOTE — TELEPHONE ENCOUNTER
----- Message from Andressa Gaspar sent at 6/15/2018 12:55 PM CDT -----  Contact: self/[phone]  Patient is returning a phone call.  Who left a message for the patient: Tasneem  Does patient know what this is regarding: no   Comments: thank you!!!

## 2018-06-29 DIAGNOSIS — E11.9 TYPE 2 DIABETES MELLITUS WITHOUT COMPLICATION: ICD-10-CM

## 2018-07-29 ENCOUNTER — HOSPITAL ENCOUNTER (EMERGENCY)
Facility: HOSPITAL | Age: 68
Discharge: HOME | End: 2018-07-29
Attending: EMERGENCY MEDICINE
Payer: MEDICARE

## 2018-07-29 VITALS
DIASTOLIC BLOOD PRESSURE: 71 MMHG | TEMPERATURE: 97 F | OXYGEN SATURATION: 98 % | WEIGHT: 203 LBS | BODY MASS INDEX: 29.06 KG/M2 | RESPIRATION RATE: 16 BRPM | SYSTOLIC BLOOD PRESSURE: 191 MMHG | HEART RATE: 86 BPM | HEIGHT: 70 IN

## 2018-07-29 DIAGNOSIS — N30.01 ACUTE CYSTITIS WITH HEMATURIA: Primary | ICD-10-CM

## 2018-07-29 DIAGNOSIS — R73.9 HYPERGLYCEMIA: ICD-10-CM

## 2018-07-29 DIAGNOSIS — R42 DIZZINESS: ICD-10-CM

## 2018-07-29 LAB
ALBUMIN SERPL-MCNC: 3.6 G/DL (ref 3.3–5.5)
ALP SERPL-CCNC: 121 U/L (ref 42–141)
BILIRUB SERPL-MCNC: 0.6 MG/DL (ref 0.2–1.6)
BILIRUBIN, POC UA: NEGATIVE
BLOOD, POC UA: ABNORMAL
BUN SERPL-MCNC: 20 MG/DL (ref 7–22)
CALCIUM SERPL-MCNC: 9.5 MG/DL (ref 8–10.3)
CHLORIDE SERPL-SCNC: 98 MMOL/L (ref 98–108)
CLARITY, POC UA: ABNORMAL
COLOR, POC UA: YELLOW
CREAT SERPL-MCNC: 0.9 MG/DL (ref 0.6–1.2)
GLUCOSE SERPL-MCNC: 328 MG/DL (ref 73–118)
GLUCOSE SERPL-MCNC: 330 MG/DL (ref 70–110)
GLUCOSE, POC UA: ABNORMAL
KETONES, POC UA: NEGATIVE
LEUKOCYTE EST, POC UA: ABNORMAL
NITRITE, POC UA: POSITIVE
PH UR STRIP: 6.5 [PH]
POC ALT (SGPT): 37 U/L (ref 10–47)
POC AST (SGOT): 45 U/L (ref 11–38)
POC CARDIAC TROPONIN I: 0 NG/ML
POC TCO2: 25 MMOL/L (ref 18–33)
POTASSIUM BLD-SCNC: 3.9 MMOL/L (ref 3.6–5.1)
PROTEIN, POC UA: ABNORMAL
PROTEIN, POC: 7.8 G/DL (ref 6.4–8.1)
SAMPLE: NORMAL
SODIUM BLD-SCNC: 138 MMOL/L (ref 128–145)
SPECIFIC GRAVITY, POC UA: 1.02
UROBILINOGEN, POC UA: 0.2 E.U./DL

## 2018-07-29 PROCEDURE — 93005 ELECTROCARDIOGRAM TRACING: CPT

## 2018-07-29 PROCEDURE — 87077 CULTURE AEROBIC IDENTIFY: CPT

## 2018-07-29 PROCEDURE — 84484 ASSAY OF TROPONIN QUANT: CPT

## 2018-07-29 PROCEDURE — 63600175 PHARM REV CODE 636 W HCPCS: Performed by: EMERGENCY MEDICINE

## 2018-07-29 PROCEDURE — 96361 HYDRATE IV INFUSION ADD-ON: CPT

## 2018-07-29 PROCEDURE — 93010 ELECTROCARDIOGRAM REPORT: CPT | Mod: ,,, | Performed by: INTERNAL MEDICINE

## 2018-07-29 PROCEDURE — 99284 EMERGENCY DEPT VISIT MOD MDM: CPT | Mod: 25

## 2018-07-29 PROCEDURE — 80053 COMPREHEN METABOLIC PANEL: CPT

## 2018-07-29 PROCEDURE — 96375 TX/PRO/DX INJ NEW DRUG ADDON: CPT

## 2018-07-29 PROCEDURE — 96365 THER/PROPH/DIAG IV INF INIT: CPT

## 2018-07-29 PROCEDURE — 85025 COMPLETE CBC W/AUTO DIFF WBC: CPT

## 2018-07-29 PROCEDURE — 87088 URINE BACTERIA CULTURE: CPT

## 2018-07-29 PROCEDURE — 87186 SC STD MICRODIL/AGAR DIL: CPT

## 2018-07-29 PROCEDURE — 87086 URINE CULTURE/COLONY COUNT: CPT

## 2018-07-29 PROCEDURE — 81003 URINALYSIS AUTO W/O SCOPE: CPT

## 2018-07-29 PROCEDURE — 25000003 PHARM REV CODE 250: Performed by: EMERGENCY MEDICINE

## 2018-07-29 RX ORDER — CEFTRIAXONE 250 MG/1
250 INJECTION, POWDER, FOR SOLUTION INTRAMUSCULAR; INTRAVENOUS
Status: DISCONTINUED | OUTPATIENT
Start: 2018-07-29 | End: 2018-07-29

## 2018-07-29 RX ORDER — MECLIZINE HYDROCHLORIDE 25 MG/1
25 TABLET ORAL
Status: COMPLETED | OUTPATIENT
Start: 2018-07-29 | End: 2018-07-29

## 2018-07-29 RX ORDER — ONDANSETRON 4 MG/1
4 TABLET, ORALLY DISINTEGRATING ORAL EVERY 8 HOURS PRN
Qty: 14 TABLET | Refills: 1 | Status: SHIPPED | OUTPATIENT
Start: 2018-07-29 | End: 2018-08-31 | Stop reason: SDUPTHER

## 2018-07-29 RX ORDER — NITROFURANTOIN 25; 75 MG/1; MG/1
100 CAPSULE ORAL 2 TIMES DAILY
Qty: 10 CAPSULE | Refills: 0 | Status: SHIPPED | OUTPATIENT
Start: 2018-07-29 | End: 2018-08-03

## 2018-07-29 RX ORDER — CEFTRIAXONE 1 G/50ML
1 INJECTION, SOLUTION INTRAVENOUS
Status: COMPLETED | OUTPATIENT
Start: 2018-07-29 | End: 2018-07-29

## 2018-07-29 RX ORDER — MECLIZINE HYDROCHLORIDE 25 MG/1
25 TABLET ORAL 3 TIMES DAILY PRN
Qty: 20 TABLET | Refills: 0 | Status: SHIPPED | OUTPATIENT
Start: 2018-07-29 | End: 2018-08-07 | Stop reason: SDUPTHER

## 2018-07-29 RX ORDER — ONDANSETRON 2 MG/ML
4 INJECTION INTRAMUSCULAR; INTRAVENOUS
Status: COMPLETED | OUTPATIENT
Start: 2018-07-29 | End: 2018-07-29

## 2018-07-29 RX ADMIN — CEFTRIAXONE 1 G: 1 INJECTION, SOLUTION INTRAVENOUS at 12:07

## 2018-07-29 RX ADMIN — MECLIZINE HYDROCHLORIDE 25 MG: 25 TABLET ORAL at 12:07

## 2018-07-29 RX ADMIN — SODIUM CHLORIDE 1000 ML: 0.9 INJECTION, SOLUTION INTRAVENOUS at 11:07

## 2018-07-29 RX ADMIN — MECLIZINE HYDROCHLORIDE 25 MG: 25 TABLET ORAL at 11:07

## 2018-07-29 RX ADMIN — ONDANSETRON 4 MG: 2 INJECTION INTRAMUSCULAR; INTRAVENOUS at 11:07

## 2018-07-29 NOTE — ED PROVIDER NOTES
"Encounter Date: 7/29/2018       History     Chief Complaint   Patient presents with    Dizziness     pt reports " i woke up out of my sleep feeling like my whole body was on fire, then when I got out of bed I felt real dizzy." reports vomitting twice PTA. Denies pain     Nausea    Vomiting     Chief complaint:  Dizziness  67-year-old says that she awoke this morning and her whole body was on fire".  She says this happens sometimes when her blood sugar is low.  Patient got up and felt very lightheaded.  Patient was able to hold onto the wall, get to the kitchen and check her blood sugar.  She said that it was 220.  She vomited at that point.  She was able to make it back to her bedroom where she vomited a 2nd time.  Patient says she actually felt better after she vomited.  She said the vomitus was bilious.  She denies headache or change in vision.  She does not feel the room spinning but rather feels lightheaded.  The dizziness worsens with movement.  She has chronic tinnitus.  No chest pain, headache or shortness of breath.      The history is provided by the patient and the spouse.     Review of patient's allergies indicates:   Allergen Reactions    Novolin 70/30 (semi-synthetic) Nausea And Vomiting     Severe vomiting on Relion 70/30    Victoza [liraglutide] Nausea And Vomiting    Glipizide Nausea Only    Asa [aspirin]     Citric acid     Codeine     Egg derived     Iodine and iodide containing products     Rituxan [rituximab]     Soy     Sulfa (sulfonamide antibiotics)     Talwin [pentazocine lactate]     Zoloft [sertraline]      Past Medical History:   Diagnosis Date    Anxiety     Diabetes mellitus, type II     Follicular lymphoma     HTN (hypertension)     Restless leg syndrome      Past Surgical History:   Procedure Laterality Date    COLONOSCOPY  11/07/2012    Colon polyp found; repeat in 5 years    ELBOW SURGERY      ESOPHAGOGASTRODUODENOSCOPY  11/07/2012    atrophic gastritis, H " pylori testing negative    KNEE SURGERY       Family History   Problem Relation Age of Onset    Cancer Mother     Heart disease Mother     Cancer Father         lung    Diabetes Sister     Hypertension Sister     Stroke Neg Hx     Hyperlipidemia Neg Hx      Social History   Substance Use Topics    Smoking status: Never Smoker    Smokeless tobacco: Never Used    Alcohol use No      Comment: socially     Review of Systems   Constitutional: Negative for fever.   HENT: Positive for tinnitus (Chronic). Negative for sore throat.    Eyes: Negative for visual disturbance.   Respiratory: Negative for shortness of breath.    Cardiovascular: Negative for chest pain.   Gastrointestinal: Positive for nausea and vomiting. Negative for abdominal pain.   Genitourinary: Negative for dysuria.   Musculoskeletal: Positive for gait problem. Negative for back pain.   Skin: Negative for rash.   Neurological: Positive for light-headedness. Negative for weakness.       Physical Exam     Initial Vitals [07/29/18 1049]   BP Pulse Resp Temp SpO2   (!) 160/74 90 18 97.4 °F (36.3 °C) 99 %      MAP       --         Physical Exam    Nursing note and vitals reviewed.  Constitutional: She appears well-developed and well-nourished.   HENT:   Head: Normocephalic and atraumatic.   Right Ear: Tympanic membrane normal.   Left Ear: Tympanic membrane normal.   Slightly dry mucous   Eyes: Conjunctivae and EOM are normal. Pupils are equal, round, and reactive to light.   Neck: Normal range of motion. Neck supple.   Cardiovascular: Normal rate, regular rhythm and intact distal pulses.   Pulmonary/Chest: Breath sounds normal.   Abdominal: Soft. There is no tenderness. There is no rebound and no guarding.   Musculoskeletal: Normal range of motion. She exhibits no edema or tenderness.   Neurological: She is alert and oriented to person, place, and time. She has normal strength. No cranial nerve deficit.   Skin: Skin is warm and dry.   Psychiatric: She  has a normal mood and affect.         ED Course   Procedures  Labs Reviewed   POCT GLUCOSE - Abnormal; Notable for the following:        Result Value    POC Glucose 330 (*)     All other components within normal limits   POCT CBC   POCT URINALYSIS W/O SCOPE   POCT CMP   POCT TROPONIN     EKG Readings: (Independently Interpreted)   Normal sinus rhythm, heart rate 87, nonspecific ST changes, no ST segment elevation, normal QT, normal axis       Imaging Results    None          Medical Decision Making:   Initial Assessment:   67-year-old presents with dizziness and lightheadedness.  Patient also had vomiting. She denies the room spinning.  On exam patient has no neurological deficits.  She appears well.  ED Management:  Patient will be evaluated with labs including cardiac enzymes and given meclizine and Zofran.  She will also be given a L of IV fluids.  Patient denies history of congestive heart failure.   Patient's workup was significant for hyperglycemia without evidence of DKA.  She also had UTI.  Urine culture was sent.  Patient was given Rocephin IV.  She was given meclizine for the dizziness with some improvement.  She was given an additional dose.  Patient has no neurological deficits.  She will be discharged on Macrodantin, meclizine and Zofran                        Clinical Impression:   The primary encounter diagnosis was Acute cystitis with hematuria. Diagnoses of Dizziness and Hyperglycemia were also pertinent to this visit.                             Palma Kelly MD  07/29/18 9702

## 2018-07-30 LAB — POCT GLUCOSE: 330 MG/DL (ref 70–110)

## 2018-07-31 LAB — BACTERIA UR CULT: NORMAL

## 2018-08-02 ENCOUNTER — TELEPHONE (OUTPATIENT)
Dept: INTERNAL MEDICINE | Facility: CLINIC | Age: 68
End: 2018-08-02

## 2018-08-02 NOTE — TELEPHONE ENCOUNTER
----- Message from Martha Bañuelos sent at 8/2/2018 11:42 AM CDT -----  Contact: self [phone]  Patient was seen in the ED and was told to be seen by her PCP as soon as possible, she's still feeling the effect of the dizziness and would like to be seen sooner than the next available apt on 8/13. Please call patient back and advise    Thank you

## 2018-08-07 ENCOUNTER — OFFICE VISIT (OUTPATIENT)
Dept: INTERNAL MEDICINE | Facility: CLINIC | Age: 68
End: 2018-08-07
Payer: MEDICARE

## 2018-08-07 VITALS
HEIGHT: 70 IN | WEIGHT: 204 LBS | BODY MASS INDEX: 29.2 KG/M2 | SYSTOLIC BLOOD PRESSURE: 144 MMHG | DIASTOLIC BLOOD PRESSURE: 68 MMHG | HEART RATE: 86 BPM

## 2018-08-07 DIAGNOSIS — N30.00 ACUTE CYSTITIS WITHOUT HEMATURIA: ICD-10-CM

## 2018-08-07 DIAGNOSIS — I10 HYPERTENSION, ESSENTIAL: ICD-10-CM

## 2018-08-07 DIAGNOSIS — R42 DIZZINESS: Primary | ICD-10-CM

## 2018-08-07 PROCEDURE — 99999 PR PBB SHADOW E&M-EST. PATIENT-LVL V: CPT | Mod: PBBFAC,,, | Performed by: PHYSICIAN ASSISTANT

## 2018-08-07 PROCEDURE — 3046F HEMOGLOBIN A1C LEVEL >9.0%: CPT | Mod: CPTII,S$GLB,, | Performed by: PHYSICIAN ASSISTANT

## 2018-08-07 PROCEDURE — 99214 OFFICE O/P EST MOD 30 MIN: CPT | Mod: S$GLB,,, | Performed by: PHYSICIAN ASSISTANT

## 2018-08-07 PROCEDURE — 3078F DIAST BP <80 MM HG: CPT | Mod: CPTII,S$GLB,, | Performed by: PHYSICIAN ASSISTANT

## 2018-08-07 PROCEDURE — 3077F SYST BP >= 140 MM HG: CPT | Mod: CPTII,S$GLB,, | Performed by: PHYSICIAN ASSISTANT

## 2018-08-07 RX ORDER — MECLIZINE HYDROCHLORIDE 25 MG/1
25 TABLET ORAL 3 TIMES DAILY PRN
Qty: 20 TABLET | Refills: 0 | Status: SHIPPED | OUTPATIENT
Start: 2018-08-07 | End: 2022-04-18 | Stop reason: SDUPTHER

## 2018-08-07 NOTE — PATIENT INSTRUCTIONS
Dizziness (Uncertain Cause)  Dizziness is a common symptom. It may be described as lightheadedness, spinning, or feeling like you are going to faint. Dizziness can have many causes.  Be sure to tell the healthcare provider about:  · All medicines you take, including prescription, over-the-counter, herbs, and supplements  · Any other symptoms you have  · Any health problems you are being treated for  · Anything that causes the dizziness to get worse or better  Today's exam did not show an exact cause for your dizziness. Other tests may be needed. Follow up with your healthcare provider.  Home care  · Dizziness that occurs with sudden standing may be a sign of mild dehydration. Drink extra fluids for the next few days.  · If you recently started a new medicine, stopped a medicine, or had the dose of a current medicine changed, talk with the prescribing healthcare provider. Your medicine plan may need adjustment.  · If dizziness lasts more than a few seconds, sit or lie down until it passes. This may help prevent injury in case you pass out.  · Do not drive or use power tools or dangerous equipment until you have had no dizziness for at least 48 hours.  Follow-up care  Follow up with your healthcare provider for further evaluation within the next 7 days or as advised.  When to seek medical advice  Call your healthcare provider for any of the following:  · Worsening of symptoms or new symptoms  · Passing out or seizure  · Repeated vomiting  · Headache  · Palpitations (the sense that your heart is fluttering or beating fast or hard)  · Shortness of breath  · Blood in vomit or stool (black or red color)  · Weakness of an arm or leg or one side of the face  · Vision or hearing changes  · Trouble walking or speaking  · Chest, arm, neck, back, or jaw pain  Date Last Reviewed: 8/23/2015  © 3485-7778 [employer]. [address] [place] [address], [place] [address]. All rights reserved. This information is not intended as  a substitute for professional medical care. Always follow your healthcare professional's instructions.

## 2018-08-07 NOTE — PROGRESS NOTES
"Subjective:       Patient ID: Lorena Contreras is a 67 y.o. female.        Chief Complaint: Hospital Follow Up    Lorena Contreras is an established patient of Kenneth Luu MD here today for ED f/u visit.    Seen in ED secondary to dizziness associated with nausea and vomiting.  Also incidentally found to have a UTI but she denies any urinary sx at the time.  Urine cx grew out E. Coli sensitive to macrobid, which she completed.  BP was elevated but now improved.  She continues to feel "a little dizzy" in the mornings but significantly improved.  She has been taking meclizine once daily with significant improvement.  No further nausea or vomiting.      HTN: taking atenolol, amlodipine, and hctz daily.    DM: blood sugar 200-300 at home, has endo appointment 8/31/18, taking metformin/novolog 70/30/glipizide.      Tinnitus: x 10 years or more, reports seeing ENT in remote past, not interested in seeing them again, feels she hears fine, uses white noise at night to help her sleep.      Bilateral knee pain: chronic, occasionally takes tramadol.    Insomnia: not taking ambien.             Review of Systems   Constitutional: Negative for chills, diaphoresis, fatigue and fever.   HENT: Negative for congestion and sore throat.    Eyes: Negative for visual disturbance.   Respiratory: Negative for cough, chest tightness and shortness of breath.    Cardiovascular: Negative for chest pain, palpitations and leg swelling.   Gastrointestinal: Negative for abdominal pain, blood in stool, constipation, diarrhea, nausea and vomiting.   Genitourinary: Negative for dysuria, frequency, hematuria and urgency.   Musculoskeletal: Positive for arthralgias (knees). Negative for back pain.   Skin: Negative for rash.   Neurological: Positive for dizziness (significantly improved). Negative for syncope, weakness and headaches.   Psychiatric/Behavioral: Negative for dysphoric mood and sleep disturbance. The patient is not nervous/anxious.  "       Objective:      Physical Exam   Constitutional: She is oriented to person, place, and time. She appears well-developed and well-nourished. No distress.   HENT:   Head: Normocephalic and atraumatic.   Right Ear: Tympanic membrane and external ear normal.   Left Ear: Tympanic membrane and external ear normal.   Nose: Nose normal.   Mouth/Throat: Oropharynx is clear and moist.   Eyes: Conjunctivae are normal. Pupils are equal, round, and reactive to light.   Cardiovascular: Normal rate, regular rhythm and normal heart sounds.  Exam reveals no gallop.    No murmur heard.  Pulmonary/Chest: Effort normal and breath sounds normal. No respiratory distress.   Abdominal: Soft. Normal appearance. There is no tenderness. There is no rebound, no guarding and no CVA tenderness.   Musculoskeletal: She exhibits no edema.   Neurological: She is alert and oriented to person, place, and time. She has normal strength. No cranial nerve deficit or sensory deficit. She displays a negative Romberg sign.   Reflex Scores:       Patellar reflexes are 2+ on the right side and 2+ on the left side.  Normal finger to thumb opposition, rapid hand movements, finger to nose   Skin: Skin is warm and dry. She is not diaphoretic.   Psychiatric: She has a normal mood and affect.   Nursing note and vitals reviewed.      Assessment:       1. Dizziness    2. Hypertension, essential    3. Uncontrolled type 2 diabetes mellitus with diabetic polyneuropathy, with long-term current use of insulin    4. Acute cystitis without hematuria        Plan:       Lorena was seen today for hospital follow up.    Diagnoses and all orders for this visit:    Dizziness: significantly improved, refill of meclizine provided per patient request to use if needed  -     meclizine (ANTIVERT) 25 mg tablet; Take 1 tablet (25 mg total) by mouth 3 (three) times daily as needed.    Hypertension, essential: borderline control, improved from ED but not quite at goal, schedule f/u  "with PCP in 2-4 weeks, continue atenolol/amlodipine/hctz, only uses lasix prn 2-3 times/week    Uncontrolled type 2 diabetes mellitus with diabetic polyneuropathy, with long-term current use of insulin: uncontrolled, continue all medications, keep f/u with endocrine    Acute cystitis without hematuria: asx, treated with macrobid    Pt has been given instructions populated from [facility] database and has verbalized understanding of the after visit summary and information contained wherein.    Follow up with a primary care provider. May go to ER for acute shortness of breath, lightheadedness, fever, or any other emergent complaints or changes in condition.    "This note will be shared with the patient"    Future Appointments  Date Time Provider Department Center   8/31/2018 11:00 AM JAYCEE Hernández,[facility]P-[facility]               "

## 2018-08-24 DIAGNOSIS — I15.2 HYPERTENSION ASSOCIATED WITH DIABETES: ICD-10-CM

## 2018-08-24 DIAGNOSIS — E11.59 HYPERTENSION ASSOCIATED WITH DIABETES: ICD-10-CM

## 2018-08-24 RX ORDER — ATENOLOL 50 MG/1
50 TABLET ORAL 2 TIMES DAILY
Qty: 180 TABLET | Refills: 0 | Status: SHIPPED | OUTPATIENT
Start: 2018-08-24 | End: 2018-11-26

## 2018-08-24 RX ORDER — ATENOLOL 50 MG/1
50 TABLET ORAL 2 TIMES DAILY
Qty: 180 TABLET | Refills: 2 | Status: CANCELLED | OUTPATIENT
Start: 2018-08-24

## 2018-08-31 ENCOUNTER — OFFICE VISIT (OUTPATIENT)
Dept: ENDOCRINOLOGY | Facility: CLINIC | Age: 68
End: 2018-08-31
Payer: MEDICARE

## 2018-08-31 VITALS
DIASTOLIC BLOOD PRESSURE: 78 MMHG | HEART RATE: 84 BPM | SYSTOLIC BLOOD PRESSURE: 138 MMHG | BODY MASS INDEX: 29.59 KG/M2 | WEIGHT: 206.69 LBS | HEIGHT: 70 IN

## 2018-08-31 DIAGNOSIS — E11.59 HYPERTENSION ASSOCIATED WITH DIABETES: Chronic | ICD-10-CM

## 2018-08-31 DIAGNOSIS — E78.5 HYPERLIPIDEMIA ASSOCIATED WITH TYPE 2 DIABETES MELLITUS: Chronic | ICD-10-CM

## 2018-08-31 DIAGNOSIS — E11.69 HYPERLIPIDEMIA ASSOCIATED WITH TYPE 2 DIABETES MELLITUS: Chronic | ICD-10-CM

## 2018-08-31 DIAGNOSIS — E11.29 PERSISTENT MICROALBUMINURIA ASSOCIATED WITH TYPE 2 DIABETES MELLITUS: Chronic | ICD-10-CM

## 2018-08-31 DIAGNOSIS — I51.7 CARDIOMEGALY: ICD-10-CM

## 2018-08-31 DIAGNOSIS — E11.3592 PROLIFERATIVE DIABETIC RETINOPATHY OF LEFT EYE ASSOCIATED WITH TYPE 2 DIABETES MELLITUS, UNSPECIFIED PROLIFERATIVE RETINOPATHY TYPE: Chronic | ICD-10-CM

## 2018-08-31 DIAGNOSIS — R80.9 PERSISTENT MICROALBUMINURIA ASSOCIATED WITH TYPE 2 DIABETES MELLITUS: Chronic | ICD-10-CM

## 2018-08-31 DIAGNOSIS — E11.42 DIABETIC PERIPHERAL NEUROPATHY ASSOCIATED WITH TYPE 2 DIABETES MELLITUS: Chronic | ICD-10-CM

## 2018-08-31 DIAGNOSIS — I15.2 HYPERTENSION ASSOCIATED WITH DIABETES: Chronic | ICD-10-CM

## 2018-08-31 PROCEDURE — 99999 PR PBB SHADOW E&M-EST. PATIENT-LVL V: CPT | Mod: PBBFAC,,, | Performed by: NURSE PRACTITIONER

## 2018-08-31 PROCEDURE — 99214 OFFICE O/P EST MOD 30 MIN: CPT | Mod: S$GLB,,, | Performed by: NURSE PRACTITIONER

## 2018-08-31 RX ORDER — INSULIN ASPART 100 [IU]/ML
INJECTION, SOLUTION INTRAVENOUS; SUBCUTANEOUS
Qty: 20 ML | Refills: 6 | Status: SHIPPED | OUTPATIENT
Start: 2018-08-31 | End: 2018-11-30

## 2018-08-31 RX ORDER — ACETAMINOPHEN 500 MG
2 TABLET ORAL 2 TIMES DAILY
COMMUNITY

## 2018-08-31 NOTE — PROGRESS NOTES
CC: Management of Type 2 diabetes complicated by neuropathy and review of chronic medical conditions as listed in the visit diagnosis     HPI: Mrs. Lorena Contreras is a 67 y.o. White female who was diagnosed with Type 2 in 2010 based on labwork. She was on Glipizide, but this was discontinued r/t recurrent hypoglycemia.  She was on MDI and Metformin, but was unable to afford analog basal-bolus therapy. She has been on Januvia in the past, but it was discontinued when she was in the coverage gap. She attempted Victoza, but she experienced severe nausea and abdominal pain. Switched to mixed analog insulin in May 2017.   No previous history of pancreatitis, pancreatic cancer, or thyroid cancer.   Attempted to use VGo - not covered by insurance.   There has been treatment failure with all previous therapies tried: see above HPI.     Last seen by Dr. Sandoval, 9/2017. New to me today.   Seen in ED 7/2018 for cystitis.   No current A1c in Epic.     CURRENT DIABETIC MEDS: Metformin 1,000 mg bid, glipizide 5mg BID, Novolog mix 70/30: 24 units before lunch and dinner  Uses Pens; Rotates insulin injection sites within abdomen.   Type of Glucose Meter: True Result          Denies missing doses of medicine.     Drinks coffee in the morning only. Doesn't eat breakfast, but eats lunch at 2pm and dinner 8-9pm. Has fruit at times for snack when BG low (oranges, grapes, strawberries).     No exercise.     Last Eye Exam: 2017  Last Podiatry Exam: None    REVIEW OF SYSTEMS  General: no weakness; (+) chronic fatigue. Sleeps poorly despite Ambien.   Eyes: wears glasses; no blurry vision, eye pain, or discharge.    Cardiac: no chest pain or palpitations.   Respiratory: no cough or dyspnea.   GI: no abdominal pain, diarrhea, constipation; no current nausea  Skin: no rashes, wounds, or bruising; no insulin injection site reactions.   Neuro: feet with numbness and tingling; reports insomnia, which she takes Ambien for, but it isn't  "effective  Endocrine: no polyuria, polydipsia, polyphagia; (+) nocturia every 2 hours.    Vital Signs  /78 (BP Location: Left arm, Patient Position: Sitting, BP Method: Large (Manual))   Pulse 84   Ht 5' 10" (1.778 m)   Wt 93.8 kg (206 lb 10.9 oz)   BMI 29.66 kg/m²     Hemoglobin A1C   Date Value Ref Range Status   12/05/2017 11.3 (H) 4.0 - 5.6 % Final     Comment:     According to ADA guidelines, hemoglobin A1c <7.0% represents  optimal control in non-pregnant diabetic patients. Different  metrics may apply to specific patient populations.   Standards of Medical Care in Diabetes-2016.  For the purpose of screening for the presence of diabetes:  <5.7%     Consistent with the absence of diabetes  5.7-6.4%  Consistent with increasing risk for diabetes   (prediabetes)  >or=6.5%  Consistent with diabetes  Currently, no consensus exists for use of hemoglobin A1c  for diagnosis of diabetes for children.  This Hemoglobin A1c assay has significant interference with fetal   hemoglobin   (HbF). The results are invalid for patients with abnormal amounts of   HbF,   including those with known Hereditary Persistence   of Fetal Hemoglobin. Heterozygous hemoglobin variants (HbAS, HbAC,   HbAD, HbAE, HbA2) do not significantly interfere with this assay;   however, presence of multiple variants in a sample may impact the %   interference.     09/05/2017 12.0 (H) 4.0 - 5.6 % Final     Comment:     According to ADA guidelines, hemoglobin A1c <7.0% represents  optimal control in non-pregnant diabetic patients. Different  metrics may apply to specific patient populations.   Standards of Medical Care in Diabetes-2016.  For the purpose of screening for the presence of diabetes:  <5.7%     Consistent with the absence of diabetes  5.7-6.4%  Consistent with increasing risk for diabetes   (prediabetes)  >or=6.5%  Consistent with diabetes  Currently, no consensus exists for use of hemoglobin A1c  for diagnosis of diabetes for " children.  This Hemoglobin A1c assay has significant interference with fetal   hemoglobin   (HbF). The results are invalid for patients with abnormal amounts of   HbF,   including those with known Hereditary Persistence   of Fetal Hemoglobin. Heterozygous hemoglobin variants (HbAS, HbAC,   HbAD, HbAE, HbA2) do not significantly interfere with this assay;   however, presence of multiple variants in a sample may impact the %   interference.     05/01/2017 10.4 (H) 4.5 - 6.2 % Final     Comment:     According to ADA guidelines, hemoglobin A1C <7.0% represents  optimal control in non-pregnant diabetic patients.  Different  metrics may apply to specific populations.   Standards of Medical Care in Diabetes - 2016.  For the purpose of screening for the presence of diabetes:  <5.7%     Consistent with the absence of diabetes  5.7-6.4%  Consistent with increasing risk for diabetes   (prediabetes)  >or=6.5%  Consistent with diabetes  Currently no consensus exists for use of hemoglobin A1C  for diagnosis of diabetes for children.         Chemistry        Component Value Date/Time     02/01/2018 1011    K 4.7 02/01/2018 1011    CL 99 02/01/2018 1011    CO2 26 02/01/2018 1011    BUN 28 (H) 02/01/2018 1011    CREATININE 1.3 02/01/2018 1011     (H) 02/01/2018 1011        Component Value Date/Time    CALCIUM 10.2 02/01/2018 1011    ALKPHOS 120 02/01/2018 1011    AST 56 (H) 02/01/2018 1011    ALT 53 (H) 02/01/2018 1011    BILITOT 0.4 02/01/2018 1011        Lab Results   Component Value Date    CHOL 201 (H) 12/05/2017    CHOL 203 (H) 10/28/2016    CHOL 214 (H) 08/22/2016     Lab Results   Component Value Date    HDL 38 (L) 12/05/2017    HDL 42 10/28/2016    HDL 41 08/22/2016     Lab Results   Component Value Date    LDLCALC 115.2 12/05/2017    LDLCALC 128.8 10/28/2016    LDLCALC 124.2 08/22/2016     Lab Results   Component Value Date    TRIG 239 (H) 12/05/2017    TRIG 161 (H) 10/28/2016    TRIG 244 (H) 08/22/2016     Lab  Results   Component Value Date    CHOLHDL 18.9 (L) 12/05/2017    CHOLHDL 20.7 10/28/2016    CHOLHDL 19.2 (L) 08/22/2016     Lab Results   Component Value Date    TSH 0.665 12/05/2017     Lab Results   Component Value Date    MICALBCREAT 133.9 (H) 09/05/2017     Assessment/Plan  type 2 diabetes mellitus complicated by Diabetic peripheral neuropathy and retinopathy   DM uncontrolled  Unable to afford MDI in the past - will refer to PAP program.   She would like to try Januvia if affordable, start 100mg QD. eRx sent  For now change Novolog 70/30 : 10 units AM, 30 units lunch, 20 units dinner. Inject BEFORE meals.   Change to VGo 20 with Novolog or Regular, start with 3 clicks per meal. Refer to [facility] for VGo training. eRx sent to [facility].   D/c Glipizide 5 mg bid   Continue Metformin   Monitor BG 4x/day and bring logs to next visit  - takes ASA    Diabetic Nephropathy Associated with Type 2 Diabetes  Proteinuria has improved since last visit; need repeat  Improve BG readings    Essential hypertension with cardiomegaly  avoid hypoglycemia  Stable  Managed by PCP    Hyperlipidemia  Has never been on treatment and refused any medications in past; noted LFTs  Discussed risk associated with uncontrolled lipids (CVA, MI) at last visit.     Overweight, Body mass index is 29.66 kg/m².   Can worsen insulin resistance  Weight loss can help  - unable to afford GLP1 RA in past    Orders Placed This Encounter   Procedures    Hemoglobin A1c    Hemoglobin A1c    TSH    Microalbumin/creatinine urine ratio    Ambulatory Referral to Diabetes Education    Ambulatory Referral to Diabetes Education         FOLLOW UP  Follow-up in about 3 months (around 11/30/2018).

## 2018-08-31 NOTE — PATIENT INSTRUCTIONS
Continue metformin    Stop glipizide      Change Novolog Mix 70/30 to 10 units breakfast, 30 units lunch, 20 units dinner      Start Januvia 100mg - 1 pill daily      When insurance approves VGo, will start VGo 20 with Novolog - 1 click with coffee; then 3 clicks before lunch and 3 clicks before dinner; 2 clicks with snacks

## 2018-09-03 DIAGNOSIS — I15.2 HYPERTENSION ASSOCIATED WITH DIABETES: ICD-10-CM

## 2018-09-03 DIAGNOSIS — E11.59 HYPERTENSION ASSOCIATED WITH DIABETES: ICD-10-CM

## 2018-09-03 RX ORDER — HYDROCHLOROTHIAZIDE 25 MG/1
TABLET ORAL
Qty: 90 TABLET | Refills: 1 | Status: SHIPPED | OUTPATIENT
Start: 2018-09-03 | End: 2019-02-20 | Stop reason: SDUPTHER

## 2018-09-12 DIAGNOSIS — E11.65 UNCONTROLLED TYPE 2 DIABETES MELLITUS WITH HYPERGLYCEMIA, WITH LONG-TERM CURRENT USE OF INSULIN: ICD-10-CM

## 2018-09-12 DIAGNOSIS — Z79.4 UNCONTROLLED TYPE 2 DIABETES MELLITUS WITH HYPERGLYCEMIA, WITH LONG-TERM CURRENT USE OF INSULIN: ICD-10-CM

## 2018-09-13 RX ORDER — METFORMIN HYDROCHLORIDE 1000 MG/1
TABLET ORAL
Qty: 180 TABLET | Refills: 3 | Status: SHIPPED | OUTPATIENT
Start: 2018-09-13 | End: 2019-09-06 | Stop reason: SDUPTHER

## 2018-09-24 NOTE — TELEPHONE ENCOUNTER
----- Message from Ryanne Valladares sent at 9/24/2018  1:34 PM CDT -----  Contact: pt  NEVER RECEIVED      TRISHA SAYS NEVER RECEIVED     OK'ED   8/13    PLEASE CALL IN    [facility] [place] ([place], [place] - [address] [facility]  [phone]        SITagliptin (JANUVIA) 100 MG Tab   Medication   Date: 8/31/2018 Department: [facility] - Endocrinology Ordering/Authorizing: JAYCEE Hernández,ANP-C  Order Providers     Prescribing Provider Encounter Provider  JAYCEE Hernández,ANP-C JAYCEE Hernández,ANP-C  Supervision Information     Supervising Provider Type of Supervision  Vaishnavi White MD Collaborating Physician  Medication Detail      Disp Refills Start End   SITagliptin (JANUVIA) 100 MG Tab 90 tablet 3 8/31/2018 8/31/2019   Sig - Route: Take 1 tablet (100 mg total) by mouth once daily. - Oral   Sent to pharmacy as: SITagliptin (JANUVIA) 100 MG Tab   E-Prescribing Status: Receipt confirmed by pharmacy (8/31/2018 11:47 AM CDT)   Associated Diagnoses     Uncontrolled type 2 diabetes mellitus with diabetic polyneuropathy, with long-term current use of insulin  - Prima

## 2018-11-26 ENCOUNTER — LAB VISIT (OUTPATIENT)
Dept: LAB | Facility: HOSPITAL | Age: 68
End: 2018-11-26
Attending: NURSE PRACTITIONER
Payer: MEDICARE

## 2018-11-26 DIAGNOSIS — E11.59 HYPERTENSION ASSOCIATED WITH DIABETES: ICD-10-CM

## 2018-11-26 DIAGNOSIS — I15.2 HYPERTENSION ASSOCIATED WITH DIABETES: ICD-10-CM

## 2018-11-26 LAB — TSH SERPL DL<=0.005 MIU/L-ACNC: 0.47 UIU/ML

## 2018-11-26 PROCEDURE — 36415 COLL VENOUS BLD VENIPUNCTURE: CPT

## 2018-11-26 PROCEDURE — 83036 HEMOGLOBIN GLYCOSYLATED A1C: CPT

## 2018-11-26 PROCEDURE — 84443 ASSAY THYROID STIM HORMONE: CPT

## 2018-11-26 RX ORDER — ATENOLOL 50 MG/1
50 TABLET ORAL 2 TIMES DAILY
Qty: 180 TABLET | Refills: 3 | Status: ON HOLD | OUTPATIENT
Start: 2018-11-26 | End: 2019-03-31 | Stop reason: HOSPADM

## 2018-11-27 LAB
ESTIMATED AVG GLUCOSE: 209 MG/DL
ESTIMATED AVG GLUCOSE: 209 MG/DL
HBA1C MFR BLD HPLC: 8.9 %
HBA1C MFR BLD HPLC: 8.9 %

## 2018-11-30 ENCOUNTER — OFFICE VISIT (OUTPATIENT)
Dept: ENDOCRINOLOGY | Facility: CLINIC | Age: 68
End: 2018-11-30
Payer: MEDICARE

## 2018-11-30 VITALS
SYSTOLIC BLOOD PRESSURE: 150 MMHG | RESPIRATION RATE: 18 BRPM | DIASTOLIC BLOOD PRESSURE: 70 MMHG | BODY MASS INDEX: 29.94 KG/M2 | HEART RATE: 88 BPM | HEIGHT: 70 IN | WEIGHT: 209.13 LBS

## 2018-11-30 DIAGNOSIS — R80.9 PERSISTENT MICROALBUMINURIA ASSOCIATED WITH TYPE 2 DIABETES MELLITUS: Chronic | ICD-10-CM

## 2018-11-30 DIAGNOSIS — E11.29 PERSISTENT MICROALBUMINURIA ASSOCIATED WITH TYPE 2 DIABETES MELLITUS: Chronic | ICD-10-CM

## 2018-11-30 DIAGNOSIS — E11.42 DIABETIC PERIPHERAL NEUROPATHY ASSOCIATED WITH TYPE 2 DIABETES MELLITUS: Chronic | ICD-10-CM

## 2018-11-30 DIAGNOSIS — E78.5 HYPERLIPIDEMIA ASSOCIATED WITH TYPE 2 DIABETES MELLITUS: Chronic | ICD-10-CM

## 2018-11-30 DIAGNOSIS — E11.69 HYPERLIPIDEMIA ASSOCIATED WITH TYPE 2 DIABETES MELLITUS: Chronic | ICD-10-CM

## 2018-11-30 DIAGNOSIS — I15.2 HYPERTENSION ASSOCIATED WITH DIABETES: Chronic | ICD-10-CM

## 2018-11-30 DIAGNOSIS — E11.3592 PROLIFERATIVE DIABETIC RETINOPATHY OF LEFT EYE ASSOCIATED WITH TYPE 2 DIABETES MELLITUS, UNSPECIFIED PROLIFERATIVE RETINOPATHY TYPE: Chronic | ICD-10-CM

## 2018-11-30 DIAGNOSIS — E11.59 HYPERTENSION ASSOCIATED WITH DIABETES: Chronic | ICD-10-CM

## 2018-11-30 PROCEDURE — 99214 OFFICE O/P EST MOD 30 MIN: CPT | Mod: S$GLB,,, | Performed by: NURSE PRACTITIONER

## 2018-11-30 PROCEDURE — 99999 PR PBB SHADOW E&M-EST. PATIENT-LVL V: CPT | Mod: PBBFAC,,, | Performed by: NURSE PRACTITIONER

## 2018-11-30 RX ORDER — PEN NEEDLE, DIABETIC 30 GX3/16"
NEEDLE, DISPOSABLE MISCELLANEOUS
Qty: 400 EACH | Refills: 3 | Status: SHIPPED | OUTPATIENT
Start: 2018-11-30 | End: 2020-02-10

## 2018-11-30 RX ORDER — INSULIN ASPART 100 [IU]/ML
30 INJECTION, SUSPENSION SUBCUTANEOUS
Qty: 4 BOX | Refills: 3 | Status: SHIPPED | OUTPATIENT
Start: 2018-11-30 | End: 2020-06-10 | Stop reason: SDUPTHER

## 2018-11-30 RX ORDER — DULOXETIN HYDROCHLORIDE 60 MG/1
60 CAPSULE, DELAYED RELEASE ORAL DAILY
Qty: 30 CAPSULE | Refills: 6 | Status: ON HOLD | OUTPATIENT
Start: 2018-11-30 | End: 2019-04-09 | Stop reason: CLARIF

## 2018-11-30 NOTE — PROGRESS NOTES
"CC: Management of Type 2 diabetes complicated by neuropathy and review of chronic medical conditions as listed in the visit diagnosis     HPI: Mrs. Lorena Contreras is a 68 y.o. White female who was diagnosed with Type 2 in 2010 based on labwork. She was on Glipizide, but this was discontinued r/t recurrent hypoglycemia.  She was on MDI and Metformin, but was unable to afford analog basal-bolus therapy. She has been on Januvia in the past, but it was discontinued when she was in the coverage gap. She attempted Victoza, but she experienced severe nausea and abdominal pain. Switched to mixed analog insulin in May 2017.   No previous history of pancreatitis, pancreatic cancer, or thyroid cancer.   Attempted to use VGo - not covered by insurance.   There has been treatment failure with all previous therapies tried: see above HPI.     Seen by Dr. Sandoval, 9/2017, last seen by me 8/2018 when added Januvia. Has not filled VGo Rx yet. A1c improved.     CURRENT DIABETIC MEDS: Metformin 1,000 mg bid, Januvia 100 mg QD, Novolog mix 70/30: 10 units before breakfast, 30 units lunch and 20 units dinner  Uses Pens; Rotates insulin injection sites within abdomen.     Type of Glucose Meter: True Metrix  Meter destroyed ~ 2 weeks ago; No meter or logs to clinic, reports BG "150-170s when not eating", then "goes into 200-300 when I eat"    Denies missing doses of medicine.     Diet: 2 meals; eats lunch at 2pm and dinner 8-9pm. Has fruit at times for snack when BG low (oranges, grapes).     No exercise.     Last Eye Exam: 2017  Last Podiatry Exam: None    REVIEW OF SYSTEMS  General: no weakness; + wt gain; (+) chronic fatigue. Sleeps poorly + daytime napping.   Eyes: wears glasses; no blurry vision, eye pain, or discharge.    Cardiac: no chest pain or palpitations.   Respiratory: no cough or dyspnea.   GI: no abdominal pain, diarrhea, constipation; no current nausea  Skin: no rashes, wounds, or bruising; no insulin injection site " "reactions.   Neuro: + both feet with numbness and tingling  Endocrine: no polyuria, polydipsia, polyphagia; (+) nocturia every 2 hours.    Vital Signs  BP (!) 150/70   Pulse 88   Resp 18   Ht 5' 10" (1.778 m)   Wt 94.8 kg (209 lb 1.7 oz)   BMI 30.00 kg/m²     Hemoglobin A1C   Date Value Ref Range Status   11/26/2018 8.9 (H) 4.0 - 5.6 % Final     Comment:     ADA Screening Guidelines:  5.7-6.4%  Consistent with prediabetes  >or=6.5%  Consistent with diabetes  High levels of fetal hemoglobin interfere with the HbA1C  assay. Heterozygous hemoglobin variants (HbS, HgC, etc)do  not significantly interfere with this assay.   However, presence of multiple variants may affect accuracy.     11/26/2018 8.9 (H) 4.0 - 5.6 % Final     Comment:     ADA Screening Guidelines:  5.7-6.4%  Consistent with prediabetes  >or=6.5%  Consistent with diabetes  High levels of fetal hemoglobin interfere with the HbA1C  assay. Heterozygous hemoglobin variants (HbS, HgC, etc)do  not significantly interfere with this assay.   However, presence of multiple variants may affect accuracy.     12/05/2017 11.3 (H) 4.0 - 5.6 % Final     Comment:     According to ADA guidelines, hemoglobin A1c <7.0% represents  optimal control in non-pregnant diabetic patients. Different  metrics may apply to specific patient populations.   Standards of Medical Care in Diabetes-2016.  For the purpose of screening for the presence of diabetes:  <5.7%     Consistent with the absence of diabetes  5.7-6.4%  Consistent with increasing risk for diabetes   (prediabetes)  >or=6.5%  Consistent with diabetes  Currently, no consensus exists for use of hemoglobin A1c  for diagnosis of diabetes for children.  This Hemoglobin A1c assay has significant interference with fetal   hemoglobin   (HbF). The results are invalid for patients with abnormal amounts of   HbF,   including those with known Hereditary Persistence   of Fetal Hemoglobin. Heterozygous hemoglobin variants (HbAS, " HbAC,   HbAD, HbAE, HbA2) do not significantly interfere with this assay;   however, presence of multiple variants in a sample may impact the %   interference.         Chemistry        Component Value Date/Time     02/01/2018 1011    K 4.7 02/01/2018 1011    CL 99 02/01/2018 1011    CO2 26 02/01/2018 1011    BUN 28 (H) 02/01/2018 1011    CREATININE 1.3 02/01/2018 1011     (H) 02/01/2018 1011        Component Value Date/Time    CALCIUM 10.2 02/01/2018 1011    ALKPHOS 120 02/01/2018 1011    AST 56 (H) 02/01/2018 1011    ALT 53 (H) 02/01/2018 1011    BILITOT 0.4 02/01/2018 1011        Lab Results   Component Value Date    CHOL 201 (H) 12/05/2017    CHOL 203 (H) 10/28/2016    CHOL 214 (H) 08/22/2016     Lab Results   Component Value Date    HDL 38 (L) 12/05/2017    HDL 42 10/28/2016    HDL 41 08/22/2016     Lab Results   Component Value Date    LDLCALC 115.2 12/05/2017    LDLCALC 128.8 10/28/2016    LDLCALC 124.2 08/22/2016     Lab Results   Component Value Date    TRIG 239 (H) 12/05/2017    TRIG 161 (H) 10/28/2016    TRIG 244 (H) 08/22/2016     Lab Results   Component Value Date    TSH 0.473 11/26/2018     Lab Results   Component Value Date    MICALBCREAT 133.9 (H) 09/05/2017     Assessment/Plan  type 2 diabetes mellitus complicated by Diabetic peripheral neuropathy and retinopathy   DM uncontrolled  Change Novolog 70/30 : 10 units AM, 30 units lunch, 30 units dinner. Inject BEFORE meals. eRx sent  Continue Metformin and Januvia  Monitor BG 4x/day and bring logs to next visit. Provided new glucometer today.   - takes ASA    - start Cymbalta for DM neuropathic pain, eRx sent, discussed MOA, side effects, administration. Written Rx provided today.     Diabetic Nephropathy Associated with Type 2 Diabetes  Proteinuria  Improve BG readings    Essential hypertension with cardiomegaly  avoid hypoglycemia  Stable  Managed by PCP    Hyperlipidemia  Has never been on treatment and refused any medications in past;  noted LFTs  Discussed risk associated with uncontrolled lipids (CVA, MI) at last visit.     Obesity, Body mass index is 30 kg/m².   Can worsen insulin resistance  Weight loss can help  - unable to afford GLP1 RA in past    Orders Placed This Encounter   Procedures    Hemoglobin A1c    Microalbumin/creatinine urine ratio         FOLLOW UP  Follow-up in about 4 months (around 3/30/2019) for Concepcion Zee NP on [facility].

## 2018-11-30 NOTE — PATIENT INSTRUCTIONS
Duloxetine delayed-release capsules  What is this medicine?  DULOXETINE (doo LOX e teen) is used to treat depression, anxiety, and different types of chronic pain.  How should I use this medicine?  Take this medicine by mouth with a glass of water. Follow the directions on the prescription label. Do not cut, crush or chew this medicine. You can take this medicine with or without food. Take your medicine at regular intervals. Do not take your medicine more often than directed. Do not stop taking this medicine suddenly except upon the advice of your doctor. Stopping this medicine too quickly may cause serious side effects or your condition may worsen.  A special MedGuide will be given to you by the pharmacist with each prescription and refill. Be sure to read this information carefully each time.  Talk to your pediatrician regarding the use of this medicine in children. While this drug may be prescribed for children as young as 7 years of age for selected conditions, precautions do apply.  What side effects may I notice from receiving this medicine?  Side effects that you should report to your doctor or health care professional as soon as possible:  · allergic reactions like skin rash, itching or hives, swelling of the face, lips, or tongue  · changes in blood pressure  · confusion  · dark urine  · dizziness  · fast talking and excited feelings or actions that are out of control  · fast, irregular heartbeat  · fever  · general ill feeling or flu-like symptoms  · hallucination, loss of contact with reality  · light-colored stools  · loss of balance or coordination  · redness, blistering, peeling or loosening of the skin, including inside the mouth  · right upper belly pain  · seizures  · suicidal thoughts or other mood changes  · trouble concentrating  · trouble passing urine or change in the amount of urine  · unusual bleeding or bruising  · unusually weak or tired  · yellowing of the eyes or skin  Side effects that  usually do not require medical attention (report to your doctor or health care professional if they continue or are bothersome):  · blurred vision  · change in appetite  · change in sex drive or performance  · headache  · increased sweating  · nausea  What may interact with this medicine?  Do not take this medicine with any of the following medications:  · certain diet drugs like dexfenfluramine, fenfluramine  · desvenlafaxine  · linezolid  · MAOIs like Azilect, Carbex, Eldepryl, Marplan, Nardil, and Parnate  · methylene blue (intravenous)  · milnacipran  · thioridazine  · venlafaxine  This medicine may also interact with the following medications:  · alcohol  · aspirin and aspirin-like medicines  · certain antibiotics like ciprofloxacin and enoxacin  · certain medicines for blood pressure, heart disease, irregular heart beat  · certain medicines for depression, anxiety, or psychotic disturbances  · certain medicines for migraine headache like almotriptan, eletriptan, frovatriptan, naratriptan, rizatriptan, sumatriptan, zolmitriptan  · certain medicines that treat or prevent blood clots like warfarin, enoxaparin, and dalteparin  · cimetidine  · fentanyl  · lithium  · NSAIDS, medicines for pain and inflammation, like ibuprofen or naproxen  · phentermine  · procarbazine  · sibutramine  · Merlyn's wort  · theophylline  · tramadol  · tryptophan  What if I miss a dose?  If you miss a dose, take it as soon as you can. If it is almost time for your next dose, take only that dose. Do not take double or extra doses.  Where should I keep my medicine?  Keep out of the reach of children.  Store at room temperature between 20 and 25 degrees C (68 to 77 degrees F). Throw away any unused medicine after the expiration date.  What should I tell my health care provider before I take this medicine?  They need to know if you have any of these conditions:  · bipolar disorder or a family history of bipolar  disorder  · glaucoma  · kidney disease  · liver disease  · suicidal thoughts or a previous suicide attempt  · taken medicines called MAOIs like Carbex, Eldepryl, Marplan, Nardil, and Parnate within 14 days  · an unusual reaction to duloxetine, other medicines, foods, dyes, or preservatives  · pregnant or trying to get pregnant  · breast-feeding  What should I watch for while using this medicine?  Tell your doctor if your symptoms do not get better or if they get worse. Visit your doctor or health care professional for regular checks on your progress. Because it may take several weeks to see the full effects of this medicine, it is important to continue your treatment as prescribed by your doctor.  Patients and their families should watch out for new or worsening thoughts of suicide or depression. Also watch out for sudden changes in feelings such as feeling anxious, agitated, panicky, irritable, hostile, aggressive, impulsive, severely restless, overly excited and hyperactive, or not being able to sleep. If this happens, especially at the beginning of treatment or after a change in dose, call your health care professional.  You may get drowsy or dizzy. Do not drive, use machinery, or do anything that needs mental alertness until you know how this medicine affects you. Do not stand or sit up quickly, especially if you are an older patient. This reduces the risk of dizzy or fainting spells. Alcohol may interfere with the effect of this medicine. Avoid alcoholic drinks.  This medicine can cause an increase in blood pressure. This medicine can also cause a sudden drop in your blood pressure, which may make you feel faint and increase the chance of a fall. These effects are most common when you first start the medicine or when the dose is increased, or during use of other medicines that can cause a sudden drop in blood pressure. Check with your doctor for instructions on monitoring your blood pressure while taking this  medicine.  Your mouth may get dry. Chewing sugarless gum or sucking hard candy, and drinking plenty of water may help. Contact your doctor if the problem does not go away or is severe.  NOTE:This sheet is a summary. It may not cover all possible information. If you have questions about this medicine, talk to your doctor, pharmacist, or health care provider. Copyright© 2017 [employer]

## 2018-12-11 DIAGNOSIS — I15.2 HYPERTENSION ASSOCIATED WITH DIABETES: ICD-10-CM

## 2018-12-11 DIAGNOSIS — E11.59 HYPERTENSION ASSOCIATED WITH DIABETES: ICD-10-CM

## 2018-12-11 RX ORDER — AMLODIPINE BESYLATE 10 MG/1
TABLET ORAL
Qty: 90 TABLET | Refills: 3 | Status: SHIPPED | OUTPATIENT
Start: 2018-12-11 | End: 2020-01-13

## 2018-12-13 DIAGNOSIS — E11.9 TYPE 2 DIABETES MELLITUS WITHOUT COMPLICATION: ICD-10-CM

## 2019-02-17 DIAGNOSIS — F41.9 ANXIETY: ICD-10-CM

## 2019-02-18 RX ORDER — FLUOXETINE HYDROCHLORIDE 20 MG/1
20 CAPSULE ORAL DAILY
Qty: 90 CAPSULE | Refills: 3 | Status: SHIPPED | OUTPATIENT
Start: 2019-02-18 | End: 2020-02-10

## 2019-02-18 RX ORDER — FLUOXETINE 10 MG/1
TABLET ORAL
Qty: 90 TABLET | Refills: 1 | OUTPATIENT
Start: 2019-02-18

## 2019-02-20 DIAGNOSIS — E11.59 HYPERTENSION ASSOCIATED WITH DIABETES: ICD-10-CM

## 2019-02-20 DIAGNOSIS — I15.2 HYPERTENSION ASSOCIATED WITH DIABETES: ICD-10-CM

## 2019-02-20 RX ORDER — HYDROCHLOROTHIAZIDE 25 MG/1
TABLET ORAL
Qty: 90 TABLET | Refills: 1 | Status: SHIPPED | OUTPATIENT
Start: 2019-02-20 | End: 2019-09-13 | Stop reason: SDUPTHER

## 2019-03-20 ENCOUNTER — PATIENT OUTREACH (OUTPATIENT)
Dept: ADMINISTRATIVE | Facility: HOSPITAL | Age: 69
End: 2019-03-20

## 2019-03-20 DIAGNOSIS — Z78.0 POSTMENOPAUSAL: Primary | ICD-10-CM

## 2019-03-20 NOTE — PROGRESS NOTES
Health Maintenance Due   Topic Date Due    Zoster Vaccine  10/16/2010    Colonoscopy  11/07/2017    Foot Exam  02/14/2018    Eye Exam  03/07/2018    Lipid Panel  12/05/2018    DEXA SCAN  12/08/2018    Hemoglobin A1c  02/26/2019

## 2019-03-29 ENCOUNTER — HOSPITAL ENCOUNTER (INPATIENT)
Facility: HOSPITAL | Age: 69
LOS: 2 days | Discharge: HOME | DRG: 247 | End: 2019-03-31
Attending: INTERNAL MEDICINE | Admitting: INTERNAL MEDICINE
Payer: MEDICARE

## 2019-03-29 DIAGNOSIS — R80.9 MICROALBUMINURIA: Primary | ICD-10-CM

## 2019-03-29 DIAGNOSIS — I21.19 ACUTE INFERIOR MYOCARDIAL INFARCTION: Primary | ICD-10-CM

## 2019-03-29 PROBLEM — R65.21 SEPTIC SHOCK DUE TO URINARY TRACT INFECTION: Status: ACTIVE | Noted: 2019-03-29

## 2019-03-29 PROBLEM — N39.0 SEPTIC SHOCK DUE TO URINARY TRACT INFECTION: Status: ACTIVE | Noted: 2019-03-29

## 2019-03-29 PROBLEM — I25.10 CORONARY ARTERY DISEASE INVOLVING NATIVE CORONARY ARTERY OF NATIVE HEART: Status: ACTIVE | Noted: 2019-03-29

## 2019-03-29 PROBLEM — A41.9 SEPTIC SHOCK DUE TO URINARY TRACT INFECTION: Status: ACTIVE | Noted: 2019-03-29

## 2019-03-29 LAB
ALBUMIN SERPL BCP-MCNC: 4 G/DL (ref 3.5–5.2)
ALP SERPL-CCNC: 111 U/L (ref 55–135)
ALT SERPL W/O P-5'-P-CCNC: 43 U/L (ref 10–44)
ANION GAP SERPL CALC-SCNC: 13 MMOL/L (ref 8–16)
APTT BLDCRRT: 31.5 SEC (ref 21–32)
AST SERPL-CCNC: 51 U/L (ref 10–40)
BACTERIA #/AREA URNS HPF: ABNORMAL /HPF
BASOPHILS # BLD AUTO: 0.11 K/UL (ref 0–0.2)
BASOPHILS NFR BLD: 0.8 % (ref 0–1.9)
BILIRUB SERPL-MCNC: 0.5 MG/DL (ref 0.1–1)
BILIRUB UR QL STRIP: NEGATIVE
BNP SERPL-MCNC: 312 PG/ML (ref 0–99)
BUN SERPL-MCNC: 27 MG/DL (ref 8–23)
CALCIUM SERPL-MCNC: 10.4 MG/DL (ref 8.7–10.5)
CHLORIDE SERPL-SCNC: 98 MMOL/L (ref 95–110)
CLARITY UR: CLEAR
CO2 SERPL-SCNC: 23 MMOL/L (ref 23–29)
COLOR UR: YELLOW
CREAT SERPL-MCNC: 1.3 MG/DL (ref 0.5–1.4)
DIFFERENTIAL METHOD: ABNORMAL
EOSINOPHIL # BLD AUTO: 0.2 K/UL (ref 0–0.5)
EOSINOPHIL NFR BLD: 1.5 % (ref 0–8)
ERYTHROCYTE [DISTWIDTH] IN BLOOD BY AUTOMATED COUNT: 13.6 % (ref 11.5–14.5)
EST. GFR  (AFRICAN AMERICAN): 49 ML/MIN/1.73 M^2
EST. GFR  (NON AFRICAN AMERICAN): 42 ML/MIN/1.73 M^2
GLUCOSE SERPL-MCNC: 349 MG/DL (ref 70–110)
GLUCOSE UR QL STRIP: ABNORMAL
HCT VFR BLD AUTO: 39.8 % (ref 37–48.5)
HGB BLD-MCNC: 13 G/DL (ref 12–16)
HGB UR QL STRIP: ABNORMAL
HYALINE CASTS #/AREA URNS LPF: ABNORMAL /LPF
INR PPP: 1 (ref 0.8–1.2)
KETONES UR QL STRIP: ABNORMAL
LEUKOCYTE ESTERASE UR QL STRIP: NEGATIVE
LYMPHOCYTES # BLD AUTO: 2.6 K/UL (ref 1–4.8)
LYMPHOCYTES NFR BLD: 17.8 % (ref 18–48)
MAGNESIUM SERPL-MCNC: 1.2 MG/DL (ref 1.6–2.6)
MCH RBC QN AUTO: 27.9 PG (ref 27–31)
MCHC RBC AUTO-ENTMCNC: 32.7 G/DL (ref 32–36)
MCV RBC AUTO: 85 FL (ref 82–98)
MICROSCOPIC COMMENT: ABNORMAL
MONOCYTES # BLD AUTO: 0.8 K/UL (ref 0.3–1)
MONOCYTES NFR BLD: 5.6 % (ref 4–15)
NEUTROPHILS # BLD AUTO: 10.7 K/UL (ref 1.8–7.7)
NEUTROPHILS NFR BLD: 74.3 % (ref 38–73)
NITRITE UR QL STRIP: NEGATIVE
PH UR STRIP: 5 [PH] (ref 5–8)
PLATELET # BLD AUTO: 362 K/UL (ref 150–350)
PMV BLD AUTO: 13.3 FL (ref 9.2–12.9)
POCT GLUCOSE: 491 MG/DL (ref 70–110)
POCT GLUCOSE: 493 MG/DL (ref 70–110)
POTASSIUM SERPL-SCNC: 4.3 MMOL/L (ref 3.5–5.1)
PROT SERPL-MCNC: 8.7 G/DL (ref 6–8.4)
PROT UR QL STRIP: ABNORMAL
PROTHROMBIN TIME: 10.5 SEC (ref 9–12.5)
RBC # BLD AUTO: 4.66 M/UL (ref 4–5.4)
RBC #/AREA URNS HPF: 3 /HPF (ref 0–4)
SODIUM SERPL-SCNC: 134 MMOL/L (ref 136–145)
SP GR UR STRIP: 1.02 (ref 1–1.03)
SQUAMOUS #/AREA URNS HPF: 4 /HPF
TROPONIN I SERPL DL<=0.01 NG/ML-MCNC: 0.36 NG/ML (ref 0–0.03)
URN SPEC COLLECT METH UR: ABNORMAL
UROBILINOGEN UR STRIP-ACNC: NEGATIVE EU/DL
WBC # BLD AUTO: 14.42 K/UL (ref 3.9–12.7)
WBC #/AREA URNS HPF: 12 /HPF (ref 0–5)
WBC CLUMPS URNS QL MICRO: ABNORMAL
YEAST URNS QL MICRO: ABNORMAL

## 2019-03-29 PROCEDURE — 25500020 PHARM REV CODE 255: Performed by: INTERNAL MEDICINE

## 2019-03-29 PROCEDURE — 85025 COMPLETE CBC W/AUTO DIFF WBC: CPT

## 2019-03-29 PROCEDURE — 92941 PRQ TRLML REVSC TOT OCCL AMI: CPT | Mod: RC,,, | Performed by: INTERNAL MEDICINE

## 2019-03-29 PROCEDURE — 20000000 HC ICU ROOM

## 2019-03-29 PROCEDURE — 99285 EMERGENCY DEPT VISIT HI MDM: CPT | Mod: 25

## 2019-03-29 PROCEDURE — 27201423 OPTIME MED/SURG SUP & DEVICES STERILE SUPPLY: Performed by: INTERNAL MEDICINE

## 2019-03-29 PROCEDURE — 99223 PR INITIAL HOSPITAL CARE,LEVL III: ICD-10-PCS | Mod: 25,,, | Performed by: INTERNAL MEDICINE

## 2019-03-29 PROCEDURE — 93458 PR CATH PLACE/CORON ANGIO, IMG SUPER/INTERP,W LEFT HEART VENTRICULOGRAPHY: ICD-10-PCS | Mod: 26,59,, | Performed by: INTERNAL MEDICINE

## 2019-03-29 PROCEDURE — 84484 ASSAY OF TROPONIN QUANT: CPT

## 2019-03-29 PROCEDURE — 63600175 PHARM REV CODE 636 W HCPCS: Performed by: INTERNAL MEDICINE

## 2019-03-29 PROCEDURE — 93005 ELECTROCARDIOGRAM TRACING: CPT

## 2019-03-29 PROCEDURE — 93458 L HRT ARTERY/VENTRICLE ANGIO: CPT | Performed by: INTERNAL MEDICINE

## 2019-03-29 PROCEDURE — C1769 GUIDE WIRE: HCPCS | Performed by: INTERNAL MEDICINE

## 2019-03-29 PROCEDURE — 63600175 PHARM REV CODE 636 W HCPCS: Performed by: HOSPITALIST

## 2019-03-29 PROCEDURE — 25000003 PHARM REV CODE 250: Performed by: INTERNAL MEDICINE

## 2019-03-29 PROCEDURE — 93458 L HRT ARTERY/VENTRICLE ANGIO: CPT | Mod: 26,59,, | Performed by: INTERNAL MEDICINE

## 2019-03-29 PROCEDURE — S0028 INJECTION, FAMOTIDINE, 20 MG: HCPCS | Performed by: INTERNAL MEDICINE

## 2019-03-29 PROCEDURE — 85610 PROTHROMBIN TIME: CPT

## 2019-03-29 PROCEDURE — 80053 COMPREHEN METABOLIC PANEL: CPT

## 2019-03-29 PROCEDURE — 99223 1ST HOSP IP/OBS HIGH 75: CPT | Mod: 25,,, | Performed by: INTERNAL MEDICINE

## 2019-03-29 PROCEDURE — 93010 EKG 12-LEAD: ICD-10-PCS | Mod: 59,,, | Performed by: INTERNAL MEDICINE

## 2019-03-29 PROCEDURE — C1760 CLOSURE DEV, VASC: HCPCS | Performed by: INTERNAL MEDICINE

## 2019-03-29 PROCEDURE — 99153 MOD SED SAME PHYS/QHP EA: CPT | Performed by: INTERNAL MEDICINE

## 2019-03-29 PROCEDURE — 92941 PR AMI ANY METHOD: ICD-10-PCS | Mod: RC,,, | Performed by: INTERNAL MEDICINE

## 2019-03-29 PROCEDURE — C1725 CATH, TRANSLUMIN NON-LASER: HCPCS | Performed by: INTERNAL MEDICINE

## 2019-03-29 PROCEDURE — C1887 CATHETER, GUIDING: HCPCS | Performed by: INTERNAL MEDICINE

## 2019-03-29 PROCEDURE — 93010 ELECTROCARDIOGRAM REPORT: CPT | Mod: 59,,, | Performed by: INTERNAL MEDICINE

## 2019-03-29 PROCEDURE — 83880 ASSAY OF NATRIURETIC PEPTIDE: CPT

## 2019-03-29 PROCEDURE — 87088 URINE BACTERIA CULTURE: CPT

## 2019-03-29 PROCEDURE — 99152 MOD SED SAME PHYS/QHP 5/>YRS: CPT | Mod: ,,, | Performed by: INTERNAL MEDICINE

## 2019-03-29 PROCEDURE — S5571 INSULIN DISPOS PEN 3 ML: HCPCS | Performed by: HOSPITALIST

## 2019-03-29 PROCEDURE — 81000 URINALYSIS NONAUTO W/SCOPE: CPT

## 2019-03-29 PROCEDURE — 99152 MOD SED SAME PHYS/QHP 5/>YRS: CPT | Performed by: INTERNAL MEDICINE

## 2019-03-29 PROCEDURE — 83735 ASSAY OF MAGNESIUM: CPT

## 2019-03-29 PROCEDURE — 87186 SC STD MICRODIL/AGAR DIL: CPT

## 2019-03-29 PROCEDURE — 87077 CULTURE AEROBIC IDENTIFY: CPT

## 2019-03-29 PROCEDURE — 99152 PR MOD CONSCIOUS SEDATION, SAME PHYS, 5+ YRS, FIRST 15 MIN: ICD-10-PCS | Mod: ,,, | Performed by: INTERNAL MEDICINE

## 2019-03-29 PROCEDURE — C1874 STENT, COATED/COV W/DEL SYS: HCPCS | Performed by: INTERNAL MEDICINE

## 2019-03-29 PROCEDURE — C1894 INTRO/SHEATH, NON-LASER: HCPCS | Performed by: INTERNAL MEDICINE

## 2019-03-29 PROCEDURE — 92928 PRQ TCAT PLMT NTRAC ST 1 LES: CPT | Mod: RC | Performed by: INTERNAL MEDICINE

## 2019-03-29 PROCEDURE — 87086 URINE CULTURE/COLONY COUNT: CPT

## 2019-03-29 PROCEDURE — 85730 THROMBOPLASTIN TIME PARTIAL: CPT

## 2019-03-29 DEVICE — STENT RONYX35015UX RESOLUTE ONYX 3.50X15
Type: IMPLANTABLE DEVICE | Site: CORONARY | Status: FUNCTIONAL
Brand: RESOLUTE ONYX™

## 2019-03-29 DEVICE — STENT RONYX30012UX RESOLUTE ONYX 3.00X12
Type: IMPLANTABLE DEVICE | Site: CORONARY | Status: FUNCTIONAL
Brand: RESOLUTE ONYX™

## 2019-03-29 RX ORDER — ONDANSETRON 2 MG/ML
4 INJECTION INTRAMUSCULAR; INTRAVENOUS EVERY 12 HOURS PRN
Status: DISCONTINUED | OUTPATIENT
Start: 2019-03-29 | End: 2019-03-31 | Stop reason: HOSPADM

## 2019-03-29 RX ORDER — ATORVASTATIN CALCIUM 40 MG/1
80 TABLET, FILM COATED ORAL NIGHTLY
Status: DISCONTINUED | OUTPATIENT
Start: 2019-03-29 | End: 2019-03-31 | Stop reason: HOSPADM

## 2019-03-29 RX ORDER — TIROFIBAN HYDROCHLORIDE 50 UG/ML
25 INJECTION INTRAVENOUS ONCE
Status: DISCONTINUED | OUTPATIENT
Start: 2019-03-29 | End: 2019-03-29

## 2019-03-29 RX ORDER — DIPHENHYDRAMINE HYDROCHLORIDE 50 MG/ML
INJECTION INTRAMUSCULAR; INTRAVENOUS
Status: DISCONTINUED | OUTPATIENT
Start: 2019-03-29 | End: 2019-03-29 | Stop reason: HOSPADM

## 2019-03-29 RX ORDER — INSULIN ASPART 100 [IU]/ML
1-10 INJECTION, SOLUTION INTRAVENOUS; SUBCUTANEOUS
Status: DISCONTINUED | OUTPATIENT
Start: 2019-03-29 | End: 2019-03-30

## 2019-03-29 RX ORDER — TIROFIBAN HYDROCHLORIDE 50 UG/ML
0.15 INJECTION INTRAVENOUS CONTINUOUS
Status: DISCONTINUED | OUTPATIENT
Start: 2019-03-29 | End: 2019-03-29

## 2019-03-29 RX ORDER — DULOXETIN HYDROCHLORIDE 30 MG/1
60 CAPSULE, DELAYED RELEASE ORAL DAILY
Status: DISCONTINUED | OUTPATIENT
Start: 2019-03-30 | End: 2019-03-31 | Stop reason: HOSPADM

## 2019-03-29 RX ORDER — GLUCAGON 1 MG
1 KIT INJECTION
Status: DISCONTINUED | OUTPATIENT
Start: 2019-03-29 | End: 2019-03-30

## 2019-03-29 RX ORDER — PANTOPRAZOLE SODIUM 40 MG/1
40 TABLET, DELAYED RELEASE ORAL DAILY
Status: DISCONTINUED | OUTPATIENT
Start: 2019-03-30 | End: 2019-03-31 | Stop reason: HOSPADM

## 2019-03-29 RX ORDER — ACETAMINOPHEN 325 MG/1
650 TABLET ORAL EVERY 6 HOURS PRN
Status: DISCONTINUED | OUTPATIENT
Start: 2019-03-29 | End: 2019-03-31 | Stop reason: HOSPADM

## 2019-03-29 RX ORDER — DIPHENHYDRAMINE HCL 25 MG
25 CAPSULE ORAL NIGHTLY PRN
Status: DISCONTINUED | OUTPATIENT
Start: 2019-03-29 | End: 2019-03-31 | Stop reason: HOSPADM

## 2019-03-29 RX ORDER — LIDOCAINE HYDROCHLORIDE 10 MG/ML
INJECTION, SOLUTION EPIDURAL; INFILTRATION; INTRACAUDAL; PERINEURAL
Status: DISCONTINUED | OUTPATIENT
Start: 2019-03-29 | End: 2019-03-29 | Stop reason: HOSPADM

## 2019-03-29 RX ORDER — LABETALOL HYDROCHLORIDE 5 MG/ML
INJECTION, SOLUTION INTRAVENOUS
Status: DISCONTINUED | OUTPATIENT
Start: 2019-03-29 | End: 2019-03-29 | Stop reason: HOSPADM

## 2019-03-29 RX ORDER — FAMOTIDINE 10 MG/ML
INJECTION INTRAVENOUS
Status: DISCONTINUED | OUTPATIENT
Start: 2019-03-29 | End: 2019-03-29 | Stop reason: HOSPADM

## 2019-03-29 RX ORDER — FLUOXETINE HYDROCHLORIDE 20 MG/1
20 CAPSULE ORAL DAILY
Status: DISCONTINUED | OUTPATIENT
Start: 2019-03-30 | End: 2019-03-31 | Stop reason: HOSPADM

## 2019-03-29 RX ORDER — POLYETHYLENE GLYCOL 3350 17 G/17G
17 POWDER, FOR SOLUTION ORAL 2 TIMES DAILY PRN
Status: DISCONTINUED | OUTPATIENT
Start: 2019-03-29 | End: 2019-03-31 | Stop reason: HOSPADM

## 2019-03-29 RX ORDER — IBUPROFEN 200 MG
16 TABLET ORAL
Status: DISCONTINUED | OUTPATIENT
Start: 2019-03-29 | End: 2019-03-30

## 2019-03-29 RX ORDER — MIRTAZAPINE 7.5 MG/1
7.5 TABLET, FILM COATED ORAL NIGHTLY PRN
Status: DISCONTINUED | OUTPATIENT
Start: 2019-03-29 | End: 2019-03-31 | Stop reason: HOSPADM

## 2019-03-29 RX ORDER — IBUPROFEN 200 MG
24 TABLET ORAL
Status: DISCONTINUED | OUTPATIENT
Start: 2019-03-29 | End: 2019-03-30

## 2019-03-29 RX ORDER — MIDAZOLAM HYDROCHLORIDE 1 MG/ML
INJECTION, SOLUTION INTRAMUSCULAR; INTRAVENOUS
Status: DISCONTINUED | OUTPATIENT
Start: 2019-03-29 | End: 2019-03-29 | Stop reason: HOSPADM

## 2019-03-29 RX ORDER — FENTANYL CITRATE 50 UG/ML
INJECTION, SOLUTION INTRAMUSCULAR; INTRAVENOUS
Status: DISCONTINUED | OUTPATIENT
Start: 2019-03-29 | End: 2019-03-29 | Stop reason: HOSPADM

## 2019-03-29 RX ORDER — TIROFIBAN HYDROCHLORIDE 50 UG/ML
0.15 INJECTION INTRAVENOUS CONTINUOUS
Status: DISCONTINUED | OUTPATIENT
Start: 2019-03-29 | End: 2019-03-30

## 2019-03-29 RX ORDER — DIPHENHYDRAMINE HYDROCHLORIDE 50 MG/ML
25 INJECTION INTRAMUSCULAR; INTRAVENOUS EVERY 6 HOURS PRN
Status: DISCONTINUED | OUTPATIENT
Start: 2019-03-29 | End: 2019-03-31 | Stop reason: HOSPADM

## 2019-03-29 RX ORDER — ONDANSETRON 2 MG/ML
INJECTION INTRAMUSCULAR; INTRAVENOUS
Status: DISCONTINUED | OUTPATIENT
Start: 2019-03-29 | End: 2019-03-29 | Stop reason: HOSPADM

## 2019-03-29 RX ADMIN — INSULIN DETEMIR 15 UNITS: 100 INJECTION, SOLUTION SUBCUTANEOUS at 08:03

## 2019-03-29 RX ADMIN — INSULIN ASPART 5 UNITS: 100 INJECTION, SOLUTION INTRAVENOUS; SUBCUTANEOUS at 10:03

## 2019-03-29 RX ADMIN — METOPROLOL SUCCINATE 12.5 MG: 25 TABLET, EXTENDED RELEASE ORAL at 05:03

## 2019-03-29 RX ADMIN — DIPHENHYDRAMINE HYDROCHLORIDE 25 MG: 25 CAPSULE ORAL at 10:03

## 2019-03-29 RX ADMIN — ATORVASTATIN CALCIUM 80 MG: 40 TABLET, FILM COATED ORAL at 05:03

## 2019-03-29 RX ADMIN — TIROFIBAN 0.15 MCG/KG/MIN: 5 INJECTION, SOLUTION INTRAVENOUS at 04:03

## 2019-03-29 RX ADMIN — PREDNISONE 50 MG: 5 TABLET ORAL at 08:03

## 2019-03-29 NOTE — H&P
"[facility]  Cardiology  History and Physical     Patient Name: Lorena Contreras  MRN: [id number]  Admission Date: 3/29/2019  Code Status: Prior   Attending Provider: Bladimir Barbosa MD   Primary Care Physician: Kenneth Luu MD  Principal Problem:Acute inferior myocardial infarction    Patient information was obtained from patient, past medical records and ER records.     Subjective:     Chief Complaint:  Chest pain/emergent cath lab activation for inferior ST-elevation MI     HPI:  68 y.o F with a hx of Anxiety, DM type II, Follicular lymphoma and HTN presents to the ED c/o acute onset of constant chest pain radiating into the right upper extremity since 1000h yesterday, which worsened at 1230h today. She describes her chest pain as a "dull, heavy tightness." She does not smoke cigarettes. No previous episodes of similar pain. She denies fever, chills, diaphoresis, nausea, emesis, diarrhea, abdominal pain, dysuria, SOB and rash. No prior tx.     Inferior ST elevations were noted on presentation.  Emergent cath lab activation was performed.  Patient stated that her chest pain had begun over 24 hr ago.  She still is having residual but was unable to provide any adequate history further.    Past Medical History:   Diagnosis Date    Anxiety     Diabetes mellitus, type II     Follicular lymphoma     HTN (hypertension)     Restless leg syndrome        Past Surgical History:   Procedure Laterality Date    COLONOSCOPY  11/07/2012    Colon polyp found; repeat in 5 years    ELBOW SURGERY      ESOPHAGOGASTRODUODENOSCOPY  11/07/2012    atrophic gastritis, H pylori testing negative    KNEE SURGERY         Review of patient's allergies indicates:   Allergen Reactions    Novolin 70/30 (semi-synthetic) Nausea And Vomiting     Severe vomiting on Relion 70/30    Shellfish containing products Swelling    Victoza [liraglutide] Nausea And Vomiting    Glipizide Nausea Only    Asa [aspirin]     Citric " "acid     Codeine     Egg derived     Iodine and iodide containing products     Rituxan [rituximab]     Soy     Sulfa (sulfonamide antibiotics)     Talwin [pentazocine lactate]     Zoloft [sertraline]        No current facility-administered medications on file prior to encounter.      Current Outpatient Medications on File Prior to Encounter   Medication Sig    amLODIPine (NORVASC) 10 MG tablet TAKE ONE TABLET BY MOUTH ONCE DAILY    atenolol (TENORMIN) 50 MG tablet Take 1 tablet (50 mg total) by mouth 2 (two) times daily.    blood sugar diagnostic (FREESTYLE TEST) Strp 1 strip by Misc.(Non-Drug; Combo Route) route 4 (four) times daily.    cholecalciferol, vitamin D3, (VITAMIN D3) 2,000 unit Cap Take 2 capsules by mouth 2 (two) times daily.    esomeprazole (NEXIUM) 20 MG capsule Take 20 mg by mouth before breakfast.    FLUoxetine 20 MG capsule Take 1 capsule (20 mg total) by mouth once daily.    furosemide (LASIX) 20 MG tablet Take 1 tablet (20 mg total) by mouth daily as needed (leg swelling).    hydroCHLOROthiazide (HYDRODIURIL) 25 MG tablet TAKE 1 TABLET BY MOUTH ONCE DAILY    insulin aspart protamine-insulin aspart (NOVOLOG MIX 70-30FLEXPEN U-100) 100 unit/mL (70-30) InPn pen Inject 30 Units into the skin 2 (two) times daily before meals.    lancets 32 gauge Misc 1 lancet by Misc.(Non-Drug; Combo Route) route 4 (four) times daily.    magnesium oxide (MAG-OX) 400 mg tablet Take 400 mg by mouth once daily.    metFORMIN (GLUCOPHAGE) 1000 MG tablet TAKE ONE TABLET BY MOUTH TWICE DAILY WITH MEALS    pen needle, diabetic 32 gauge x 5/32" Ndle Use with Novolog Mix Flexpens    tolnaftate (TINACTIN) 1 % cream Apply topically 2 (two) times daily. (Patient taking differently: Apply topically 2 (two) times daily as needed. )    bismuth tribrom-petrolatum,wh (XEROFORM) 5 X 9 " Bndg Apply dressing to wound daily    DULoxetine (CYMBALTA) 60 MG capsule Take 1 capsule (60 mg total) by mouth once daily.    " meclizine (ANTIVERT) 25 mg tablet Take 1 tablet (25 mg total) by mouth 3 (three) times daily as needed.    SITagliptin (JANUVIA) 100 MG Tab Take 1 tablet (100 mg total) by mouth once daily.    triamcinolone acetonide 0.1% (KENALOG) 0.1 % cream APPLY  CREAM EXTERNALLY TO AFFECTED AREA TWICE DAILY AS NEEDED     Family History     Problem Relation (Age of Onset)    Cancer Mother, Father    Diabetes Sister    Heart disease Mother    Hypertension Sister        Tobacco Use    Smoking status: Never Smoker    Smokeless tobacco: Never Used   Substance and Sexual Activity    Alcohol use: No    Drug use: No    Sexual activity: Not Currently     Review of Systems   Unable to perform ROS: acuity of condition   Constitution: Positive for diaphoresis.   Cardiovascular: Positive for chest pain.     Objective:     Vital Signs (Most Recent):  Pulse: 91 (03/29/19 1457)  Resp: 17 (03/29/19 1453)  BP: (!) 205/99 (03/29/19 1457)  SpO2: 98 % (03/29/19 1457) Vital Signs (24h Range):  Pulse:  [91-93] 91  Resp:  [17-21] 17  SpO2:  [98 %] 98 %  BP: (205)/(99) 205/99     Weight: 93.9 kg (207 lb)  Body mass index is 30.57 kg/m².    SpO2: 98 %  O2 Device (Oxygen Therapy): nasal cannula    No intake or output data in the 24 hours ending 03/29/19 1726    Lines/Drains/Airways     Peripheral Intravenous Line                 Peripheral IV - Single Lumen 03/29/19 1445 Right Forearm less than 1 day         Peripheral IV - Single Lumen 03/29/19 1450 Right Antecubital less than 1 day                Physical Exam   Constitutional: She is oriented to person, place, and time. She appears well-developed and well-nourished. She appears distressed.   HENT:   Head: Normocephalic and atraumatic.   Eyes: Pupils are equal, round, and reactive to light. Conjunctivae and EOM are normal.   Neck: Normal range of motion. Neck supple. No thyromegaly present.   Cardiovascular: Normal rate and regular rhythm.   No murmur heard.  Pulmonary/Chest: Effort normal and  breath sounds normal. No respiratory distress.   Abdominal: Soft. Bowel sounds are normal.   Musculoskeletal: She exhibits no edema.   Neurological: She is alert and oriented to person, place, and time.   Skin: Skin is warm. She is diaphoretic.   Psychiatric: She has a normal mood and affect. Her behavior is normal.       Significant Labs:   CMP   Recent Labs   Lab 03/29/19  1449   *   K 4.3   CL 98   CO2 23   *   BUN 27*   CREATININE 1.3   CALCIUM 10.4   PROT 8.7*   ALBUMIN 4.0   BILITOT 0.5   ALKPHOS 111   AST 51*   ALT 43   ANIONGAP 13   ESTGFRAFRICA 49*   EGFRNONAA 42*   , CBC   Recent Labs   Lab 03/29/19  1449   WBC 14.42*   HGB 13.0   HCT 39.8   *   , INR   Recent Labs   Lab 03/29/19  1449   INR 1.0   , Lipid Panel No results for input(s): CHOL, HDL, LDLCALC, TRIG, CHOLHDL in the last 48 hours. and Troponin   Recent Labs   Lab 03/29/19  1449   TROPONINI 0.362*       Significant Imaging: EKG: Sinus rhythm with inferior ST elevations    Assessment and Plan:     * Acute inferior myocardial infarction  Emergent cath lab activation  Emergency consent was obtained for the procedure  Will adjust therapy based off testing  Patient was premedicated with Benadryl and famotidine for possible iodine allergy    **Risks/benefits of the procedure were d/w the patient including bleeding, infection, death, mi, arrhythmia, kidney failure, stroke, etc.  Patient understands and consent was placed on the chart.    Hypertension associated with diabetes  Continue medicine adjustment as needed    Hyperlipidemia associated with type 2 diabetes mellitus  High-dose statin    Uncontrolled type 2 diabetes mellitus with diabetic polyneuropathy, with long-term current use of insulin  Consult hospitalist for diabetic management        VTE Risk Mitigation (From admission, onward)    None          Bladimir Barbosa MD  Cardiology   [facility]

## 2019-03-29 NOTE — ASSESSMENT & PLAN NOTE
Emergent cath lab activation  Emergency consent was obtained for the procedure  Will adjust therapy based off testing  Patient was premedicated with Benadryl and famotidine for possible iodine allergy    **Risks/benefits of the procedure were d/w the patient including bleeding, infection, death, mi, arrhythmia, kidney failure, stroke, etc.  Patient understands and consent was placed on the chart.

## 2019-03-29 NOTE — BRIEF OP NOTE
[facility] [place]rief [place]ative Note    SUMMARY     Surgery Date: 3/29/2019     Surgeon(s) and Role:     * Bladimir Barbosa MD - Primary    Assisting Surgeon: None    Pre-op Diagnosis:  Acute inferior myocardial infarction [I21.19]    Post-op Diagnosis:  Post-Op Diagnosis Codes:     * Acute inferior myocardial infarction [I21.19]    Procedure(s) (LRB):  Left heart cath (Left)  Percutaneous coronary intervention (N/A)    Anesthesia: RN IV Sedation    Description of Procedure:  Left heart catheterization with selective coronary angiography, PCI of the RCA with drug-eluting stent times to via right common femoral artery    Description of the findings of the procedure:  Patient has serial mid 99% eccentric lesions consistent with plaque rupture site and abnormal EKG findings.  Angiomax was used for anticoagulation during the procedure.  We initially pre-dilated with 3.0 balloon.  We placed a 3.0 by 12 mm resolute kenya stent in the midportion of the vessel.  We overlapped that with a 3.5 x 15 mm resolute kenya stent in the more proximal portion mid RCA.  Good result was achieved with MADINA 3 flow through the vessel.  Lad has a mid 90% stenosis and the left circumflex as well as the long 95% lesion as well.  Patient was premedicated with Benadryl and famotidine for possible iodine allergy.  She subsequently had mild hypotension and nausea with vomiting at the end of the procedure.  We gave IV Solu-Medrol as well which then improved the blood pressure as well as her symptoms.  We will monitor closely for bleeding as well as a 6 Amharic Mynx device was placed at the right common femoral bifurcation.  Will continue Aggrastat and possibly reload Plavix if stable.      Recommendation:  -Brilinta monotherapy currently (* possible aspirin allergy)  -consider ASA desensitization  -optimize medicines for secondary prevention  -echocardiogram when stable  -continue oral antihistamine and steroid  -consult medicine for  diabetes control  -Aggrastat times 12 hr    Estimated Blood Loss:  Less than 50 cc         Specimens:   Specimen (12h ago, onward)    None

## 2019-03-29 NOTE — Clinical Note
Catheter  middle right coronary artery. The vessel(s) were injected and visualized post intervention.

## 2019-03-29 NOTE — HPI
"68 y.o F with a hx of Anxiety, DM type II, Follicular lymphoma and HTN presents to the ED c/o acute onset of constant chest pain radiating into the right upper extremity since 1000h yesterday, which worsened at 1230h today. She describes her chest pain as a "dull, heavy tightness." She does not smoke cigarettes. No previous episodes of similar pain. She denies fever, chills, diaphoresis, nausea, emesis, diarrhea, abdominal pain, dysuria, SOB and rash. No prior tx.     Inferior ST elevations were noted on presentation.  Emergent cath lab activation was performed.  Patient stated that her chest pain had begun over 24 hr ago.  She still is having residual but was unable to provide any adequate history further.  "

## 2019-03-29 NOTE — ED TRIAGE NOTES
Pt reports cp that began yesterday morning that radiated across chest.  Denies sob, f/c/n/v/d, parastesias.  Placed on cardiac monitor, bp cuff, and pulse ox.  STEMI on EKG, MD at bedside.

## 2019-03-29 NOTE — ED PROVIDER NOTES
"Encounter Date: 3/29/2019    SCRIBE #1 NOTE: I, Tomas Catherine, am scribing for, and in the presence of,  Mk Michael MD. I have scribed the following portions of the note - Other sections scribed: HPI and ROS.       History     Chief Complaint   Patient presents with    Chest Pain     CC: Chest Pain     HPI: This 68 y.o F with a hx of Anxiety, DM type II, Follicular lymphoma and HTN presents to the ED c/o acute onset of constant chest pain radiating into the right upper extremity since 1000h yesterday, which worsened at 1230h today. She describes her chest pain as a "dull, heavy tightness." She does not smoke cigarettes. No previous episodes of similar pain. She denies fever, chills, diaphoresis, nausea, emesis, diarrhea, abdominal pain, dysuria, SOB and rash. No prior tx.     PSHx: Knee surgery x3; Elbow surgery; Colonoscopy (11/07/2012); and Esophagogastroduodenoscopy (11/07/2012).            Review of patient's allergies indicates:   Allergen Reactions    Novolin 70/30 (semi-synthetic) Nausea And Vomiting     Severe vomiting on Relion 70/30    Shellfish containing products Swelling    Victoza [liraglutide] Nausea And Vomiting    Glipizide Nausea Only    Asa [aspirin]     Citric acid     Codeine     Egg derived     Iodine and iodide containing products     Rituxan [rituximab]     Soy     Sulfa (sulfonamide antibiotics)     Talwin [pentazocine lactate]     Zoloft [sertraline]      Past Medical History:   Diagnosis Date    Anxiety     Diabetes mellitus, type II     Follicular lymphoma     HTN (hypertension)     Restless leg syndrome      Past Surgical History:   Procedure Laterality Date    COLONOSCOPY  11/07/2012    Colon polyp found; repeat in 5 years    ELBOW SURGERY      ESOPHAGOGASTRODUODENOSCOPY  11/07/2012    atrophic gastritis, H pylori testing negative    KNEE SURGERY      Left heart cath Left 3/29/2019    Performed by Bladimir [facility]osa [facility] at [facility] CATH LAB    Percutaneous " coronary intervention N/A 3/29/2019    Performed by [facility] at [facility] CATH LAB     Family History   Problem Relation Age of Onset    Cancer Mother     Heart disease Mother     Cancer Father         lung    Diabetes Sister     Hypertension Sister     Stroke Neg Hx     Hyperlipidemia Neg Hx      Social History     Tobacco Use    Smoking status: Never Smoker    Smokeless tobacco: Never Used   Substance Use Topics    Alcohol use: No    Drug use: No     Review of Systems   Constitutional: Negative for chills, diaphoresis and fever.   HENT: Negative for rhinorrhea and sore throat.    Eyes: Negative for redness.   Respiratory: Negative for cough and shortness of breath.    Cardiovascular: Positive for chest pain.   Gastrointestinal: Negative for abdominal pain, diarrhea, nausea and vomiting.   Genitourinary: Negative for dysuria, frequency and urgency.   Musculoskeletal: Negative for back pain and neck pain.        (+) R upper extremity pain   Skin: Negative for rash.   Psychiatric/Behavioral: The patient is not nervous/anxious.        Physical Exam     Initial Vitals   BP Pulse Resp Temp SpO2   03/29/19 1457 03/29/19 1450 03/29/19 1450 03/29/19 1915 03/29/19 1457   (!) 205/99 93 (!) 21 98.9 °F (37.2 °C) 98 %      MAP       --                Physical Exam  The patient was examined specifically for the following:   General:No significant distress, Good color, Warm and dry. Head and neck:Scalp atraumatic, Neck supple. Neurological:Appropriate conversation, Gross motor deficits. Eyes:Conjugate gaze, Clear corneas. ENT: No epistaxis. Cardiac: Regular rate and rhythm, Grossly normal heart tones. Pulmonary: Wheezing, Rales. Gastrointestinal: Abdominal tenderness, Abdominal distention. Musculoskeletal: Extremity deformity, Apparent pain with range of motion of the joints. Skin: Rash.   The findings on examination were normal.  Lungs are clear.  The heart tones are normal.  The abdomen is soft.  ED Course    Procedures  Labs Reviewed     EKG Readings: (Independently Interpreted)   There are ST segment elevations in lead 3 and AVF.  The patient is in a normal sinus rhythm with a heart rate of 94.     ECG Results          EKG 12-lead (In process)  Result time 03/29/19 15:21:41    In process by Interface, Lab In [facility] (03/29/19 15:21:41)                 Narrative:    Test Reason :     Vent. Rate : 094 BPM     Atrial Rate : 094 BPM     P-R Int : 138 ms          QRS Dur : 074 ms      QT Int : 368 ms       P-R-T Axes : 033 045 -07 degrees     QTc Int : 460 ms    Normal sinus rhythm  Possible Inferior infarct ,age undetermined  Abnormal ECG  When compared with ECG of 29-JUL-2018 10:47,  Significant changes have occurred    Referred By: System System           Confirmed By:                   In process by Interface, Lab In [facility] (03/29/19 15:11:12)                 Narrative:    Test Reason :     Vent. Rate : 094 BPM     Atrial Rate : 094 BPM     P-R Int : 138 ms          QRS Dur : 074 ms      QT Int : 368 ms       P-R-T Axes : 033 045 -07 degrees     QTc Int : 460 ms    Normal sinus rhythm  Possible Inferior infarct (cited on or before 29-MAR-2019)  Abnormal ECG  When compared with ECG of 29-MAR-2019 14:30,  Significant changes have occurred    Referred By: System System           Confirmed By:                              EKG 12-LEAD (Final result)  Result time 03/30/19 09:30:00    Final result by Unknown User (03/30/19 09:30:00)                                Imaging Results          X-Ray Chest AP Portable (Final result)  Result time 03/29/19 15:27:43    Final result by Rylan Roque MD (03/29/19 15:27:43)                 Impression:      No acute intrathoracic process.      Electronically signed by: Rylan Roque MD  Date:    03/29/2019  Time:    15:27             Narrative:    EXAMINATION:  XR CHEST AP PORTABLE    CLINICAL HISTORY:  chest pain;    TECHNIQUE:  Single frontal view of the chest was  performed.    COMPARISON:  Chest radiograph from 08/03/2015    FINDINGS:  Mild cardiomegaly.  Normal pulmonary vasculature.  Atherosclerosis of the aortic arch.  Lungs are clear.  No pneumothorax.  No pleural effusion.  Osseous structures demonstrate degenerative changes of the spine.                              Medical decision making:  This patient presents to the emergency room with dull heavy chest pain that radiates across the top of the shoulders both arms.  The pain started yesterday at 10:00 a.m. in the morning.  Persisted.  It got worse today at 12 noon.  The patient came to the emergency room.  The EKG was done at 2:30 p.m..  It was shown to me at 2:40 p.m..  Doctor Dr. strauss was paged at 2:45 p.m. he answered the page at 2:48 p.m. he is reviewing the EKG at this time, 2:49 p.m. I am considering ST segment elevation myocardial infarction.  Doctor Dr. ZACARIAS reviewed the EKG and responded to me at 2:51 p.m..  He asked that the EKG be repeated, and if unchanged at a code STEMI be called.  The code STEMI was called at 2:53 p.m.                 Scribe Attestation:   Scribe #1: I performed the above scribed service and the documentation accurately describes the services I performed. I attest to the accuracy of the note.    Attending Attestation:           Physician Attestation for Scribe:  Physician Attestation Statement for Scribe #1: I, Mk Michael MD, reviewed documentation, as scribed by Tomas Catherine in my presence, and it is both accurate and complete.                    Clinical Impression:       ICD-10-CM ICD-9-CM   1. Acute inferior myocardial infarction I21.19 410.40                                Mk Michael MD  04/06/19 0933

## 2019-03-29 NOTE — Clinical Note
55 ml injected throughout the case. 95 mL total wasted during the case. 150 mL total used in the case.

## 2019-03-29 NOTE — Clinical Note
Catheter  proximal right coronary artery. The vessel(s) were injected and visualized post intervention.

## 2019-03-29 NOTE — Clinical Note
Catheter  ostium right coronary artery. The vessel(s) were injected and visualized in multiple views.

## 2019-03-29 NOTE — SUBJECTIVE & OBJECTIVE
Past Medical History:   Diagnosis Date    Anxiety     Diabetes mellitus, type II     Follicular lymphoma     HTN (hypertension)     Restless leg syndrome        Past Surgical History:   Procedure Laterality Date    COLONOSCOPY  11/07/2012    Colon polyp found; repeat in 5 years    ELBOW SURGERY      ESOPHAGOGASTRODUODENOSCOPY  11/07/2012    atrophic gastritis, H pylori testing negative    KNEE SURGERY         Review of patient's allergies indicates:   Allergen Reactions    Novolin 70/30 (semi-synthetic) Nausea And Vomiting     Severe vomiting on Relion 70/30    Shellfish containing products Swelling    Victoza [liraglutide] Nausea And Vomiting    Glipizide Nausea Only    Asa [aspirin]     Citric acid     Codeine     Egg derived     Iodine and iodide containing products     Rituxan [rituximab]     Soy     Sulfa (sulfonamide antibiotics)     Talwin [pentazocine lactate]     Zoloft [sertraline]        No current facility-administered medications on file prior to encounter.      Current Outpatient Medications on File Prior to Encounter   Medication Sig    amLODIPine (NORVASC) 10 MG tablet TAKE ONE TABLET BY MOUTH ONCE DAILY    atenolol (TENORMIN) 50 MG tablet Take 1 tablet (50 mg total) by mouth 2 (two) times daily.    blood sugar diagnostic (FREESTYLE TEST) Strp 1 strip by Misc.(Non-Drug; Combo Route) route 4 (four) times daily.    cholecalciferol, vitamin D3, (VITAMIN D3) 2,000 unit Cap Take 2 capsules by mouth 2 (two) times daily.    esomeprazole (NEXIUM) 20 MG capsule Take 20 mg by mouth before breakfast.    FLUoxetine 20 MG capsule Take 1 capsule (20 mg total) by mouth once daily.    furosemide (LASIX) 20 MG tablet Take 1 tablet (20 mg total) by mouth daily as needed (leg swelling).    hydroCHLOROthiazide (HYDRODIURIL) 25 MG tablet TAKE 1 TABLET BY MOUTH ONCE DAILY    insulin aspart protamine-insulin aspart (NOVOLOG MIX 70-30FLEXPEN U-100) 100 unit/mL (70-30) InPn pen Inject 30 Units  "into the skin 2 (two) times daily before meals.    lancets 32 gauge Misc 1 lancet by Misc.(Non-Drug; Combo Route) route 4 (four) times daily.    magnesium oxide (MAG-OX) 400 mg tablet Take 400 mg by mouth once daily.    metFORMIN (GLUCOPHAGE) 1000 MG tablet TAKE ONE TABLET BY MOUTH TWICE DAILY WITH MEALS    pen needle, diabetic 32 gauge x 5/32" Ndle Use with Novolog Mix Flexpens    tolnaftate (TINACTIN) 1 % cream Apply topically 2 (two) times daily. (Patient taking differently: Apply topically 2 (two) times daily as needed. )    bismuth tribrom-petrolatum,wh (XEROFORM) 5 X 9 " Bndg Apply dressing to wound daily    DULoxetine (CYMBALTA) 60 MG capsule Take 1 capsule (60 mg total) by mouth once daily.    meclizine (ANTIVERT) 25 mg tablet Take 1 tablet (25 mg total) by mouth 3 (three) times daily as needed.    SITagliptin (JANUVIA) 100 MG Tab Take 1 tablet (100 mg total) by mouth once daily.    triamcinolone acetonide 0.1% (KENALOG) 0.1 % cream APPLY  CREAM EXTERNALLY TO AFFECTED AREA TWICE DAILY AS NEEDED     Family History     Problem Relation (Age of Onset)    Cancer Mother, Father    Diabetes Sister    Heart disease Mother    Hypertension Sister        Tobacco Use    Smoking status: Never Smoker    Smokeless tobacco: Never Used   Substance and Sexual Activity    Alcohol use: No    Drug use: No    Sexual activity: Not Currently     Review of Systems   Unable to perform ROS: acuity of condition   Constitution: Positive for diaphoresis.   Cardiovascular: Positive for chest pain.     Objective:     Vital Signs (Most Recent):  Pulse: 91 (03/29/19 1457)  Resp: 17 (03/29/19 1453)  BP: (!) 205/99 (03/29/19 1457)  SpO2: 98 % (03/29/19 1457) Vital Signs (24h Range):  Pulse:  [91-93] 91  Resp:  [17-21] 17  SpO2:  [98 %] 98 %  BP: (205)/(99) 205/99     Weight: 93.9 kg (207 lb)  Body mass index is 30.57 kg/m².    SpO2: 98 %  O2 Device (Oxygen Therapy): nasal cannula    No intake or output data in the 24 hours " ending 03/29/19 1726    Lines/Drains/Airways     Peripheral Intravenous Line                 Peripheral IV - Single Lumen 03/29/19 1445 Right Forearm less than 1 day         Peripheral IV - Single Lumen 03/29/19 1450 Right Antecubital less than 1 day                Physical Exam   Constitutional: She is oriented to person, place, and time. She appears well-developed and well-nourished. She appears distressed.   HENT:   Head: Normocephalic and atraumatic.   Eyes: Pupils are equal, round, and reactive to light. Conjunctivae and EOM are normal.   Neck: Normal range of motion. Neck supple. No thyromegaly present.   Cardiovascular: Normal rate and regular rhythm.   No murmur heard.  Pulmonary/Chest: Effort normal and breath sounds normal. No respiratory distress.   Abdominal: Soft. Bowel sounds are normal.   Musculoskeletal: She exhibits no edema.   Neurological: She is alert and oriented to person, place, and time.   Skin: Skin is warm. She is diaphoretic.   Psychiatric: She has a normal mood and affect. Her behavior is normal.       Significant Labs:   CMP   Recent Labs   Lab 03/29/19  1449   *   K 4.3   CL 98   CO2 23   *   BUN 27*   CREATININE 1.3   CALCIUM 10.4   PROT 8.7*   ALBUMIN 4.0   BILITOT 0.5   ALKPHOS 111   AST 51*   ALT 43   ANIONGAP 13   ESTGFRAFRICA 49*   EGFRNONAA 42*   , CBC   Recent Labs   Lab 03/29/19  1449   WBC 14.42*   HGB 13.0   HCT 39.8   *   , INR   Recent Labs   Lab 03/29/19  1449   INR 1.0   , Lipid Panel No results for input(s): CHOL, HDL, LDLCALC, TRIG, CHOLHDL in the last 48 hours. and Troponin   Recent Labs   Lab 03/29/19  1449   TROPONINI 0.362*       Significant Imaging: EKG: Sinus rhythm with inferior ST elevations

## 2019-03-30 LAB
ANION GAP SERPL CALC-SCNC: 12 MMOL/L (ref 8–16)
AORTIC ROOT ANNULUS: 2.58 CM
AORTIC VALVE CUSP SEPERATION: 1.81 CM
ASCENDING AORTA: 2.6 CM
AV INDEX (PROSTH): 0.59
AV MEAN GRADIENT: 8.8 MMHG
AV PEAK GRADIENT: 14.14 MMHG
AV VALVE AREA: 1.53 CM2
AV VELOCITY RATIO: 0.56
BASOPHILS # BLD AUTO: 0.02 K/UL (ref 0–0.2)
BASOPHILS NFR BLD: 0.2 % (ref 0–1.9)
BSA FOR ECHO PROCEDURE: 2.17 M2
BUN SERPL-MCNC: 29 MG/DL (ref 8–23)
CALCIUM SERPL-MCNC: 9.3 MG/DL (ref 8.7–10.5)
CHLORIDE SERPL-SCNC: 100 MMOL/L (ref 95–110)
CO2 SERPL-SCNC: 21 MMOL/L (ref 23–29)
CREAT SERPL-MCNC: 1.3 MG/DL (ref 0.5–1.4)
CV ECHO LV RWT: 0.72 CM
DIFFERENTIAL METHOD: ABNORMAL
DOP CALC AO PEAK VEL: 1.88 M/S
DOP CALC AO VTI: 44.27 CM
DOP CALC LVOT AREA: 2.57 CM2
DOP CALC LVOT DIAMETER: 1.81 CM
DOP CALC LVOT PEAK VEL: 1.05 M/S
DOP CALC LVOT STROKE VOLUME: 67.53 CM3
DOP CALCLVOT PEAK VEL VTI: 26.26 CM
E WAVE DECELERATION TIME: 188.11 MSEC
E/A RATIO: 1.14
E/E' RATIO: 21.73
ECHO LV POSTERIOR WALL: 1.5 CM (ref 0.6–1.1)
EOSINOPHIL # BLD AUTO: 0 K/UL (ref 0–0.5)
EOSINOPHIL NFR BLD: 0.1 % (ref 0–8)
ERYTHROCYTE [DISTWIDTH] IN BLOOD BY AUTOMATED COUNT: 13.6 % (ref 11.5–14.5)
EST. GFR  (AFRICAN AMERICAN): 49 ML/MIN/1.73 M^2
EST. GFR  (NON AFRICAN AMERICAN): 42 ML/MIN/1.73 M^2
FRACTIONAL SHORTENING: 29 % (ref 28–44)
GLUCOSE SERPL-MCNC: 427 MG/DL (ref 70–110)
HCT VFR BLD AUTO: 32.4 % (ref 37–48.5)
HGB BLD-MCNC: 10.7 G/DL (ref 12–16)
INTERVENTRICULAR SEPTUM: 1.36 CM (ref 0.6–1.1)
IVRT: 0.09 MSEC
LA MAJOR: 6.02 CM
LA MINOR: 5.91 CM
LA WIDTH: 4.65 CM
LEFT ATRIUM SIZE: 4.41 CM
LEFT ATRIUM VOLUME INDEX: 49 ML/M2
LEFT ATRIUM VOLUME: 103.96 CM3
LEFT INTERNAL DIMENSION IN SYSTOLE: 2.92 CM (ref 2.1–4)
LEFT VENTRICLE DIASTOLIC VOLUME INDEX: 35.86 ML/M2
LEFT VENTRICLE DIASTOLIC VOLUME: 76.09 ML
LEFT VENTRICLE MASS INDEX: 107 G/M2
LEFT VENTRICLE SYSTOLIC VOLUME INDEX: 15.5 ML/M2
LEFT VENTRICLE SYSTOLIC VOLUME: 32.86 ML
LEFT VENTRICULAR INTERNAL DIMENSION IN DIASTOLE: 4.14 CM (ref 3.5–6)
LEFT VENTRICULAR MASS: 226.94 G
LV LATERAL E/E' RATIO: 16.3
LV SEPTAL E/E' RATIO: 32.6
LYMPHOCYTES # BLD AUTO: 1.2 K/UL (ref 1–4.8)
LYMPHOCYTES NFR BLD: 11.5 % (ref 18–48)
MAGNESIUM SERPL-MCNC: 1.3 MG/DL (ref 1.6–2.6)
MCH RBC QN AUTO: 27.8 PG (ref 27–31)
MCHC RBC AUTO-ENTMCNC: 33 G/DL (ref 32–36)
MCV RBC AUTO: 84 FL (ref 82–98)
MONOCYTES # BLD AUTO: 0.3 K/UL (ref 0.3–1)
MONOCYTES NFR BLD: 2.6 % (ref 4–15)
MV PEAK A VEL: 1.43 M/S
MV PEAK E VEL: 1.63 M/S
NEUTROPHILS # BLD AUTO: 8.8 K/UL (ref 1.8–7.7)
NEUTROPHILS NFR BLD: 86.2 % (ref 38–73)
PHOSPHATE SERPL-MCNC: 4 MG/DL (ref 2.7–4.5)
PISA TR MAX VEL: 3.5 M/S
PLATELET # BLD AUTO: 258 K/UL (ref 150–350)
PMV BLD AUTO: 13.2 FL (ref 9.2–12.9)
POCT GLUCOSE: 334 MG/DL (ref 70–110)
POCT GLUCOSE: 410 MG/DL (ref 70–110)
POCT GLUCOSE: 423 MG/DL (ref 70–110)
POCT GLUCOSE: 455 MG/DL (ref 70–110)
POTASSIUM SERPL-SCNC: 5.2 MMOL/L (ref 3.5–5.1)
PULM VEIN S/D RATIO: 1.27
PV PEAK D VEL: 0.6 M/S
PV PEAK S VEL: 0.76 M/S
PV PEAK VELOCITY: 1.1 CM/S
RA MAJOR: 6.16 CM
RA PRESSURE: 3 MMHG
RA WIDTH: 4.47 CM
RBC # BLD AUTO: 3.85 M/UL (ref 4–5.4)
RIGHT VENTRICULAR END-DIASTOLIC DIMENSION: 3.78 CM
RV TISSUE DOPPLER FREE WALL SYSTOLIC VELOCITY 1 (APICAL 4 CHAMBER VIEW): 12.17 M/S
SINUS: 2.63 CM
SODIUM SERPL-SCNC: 133 MMOL/L (ref 136–145)
STJ: 2.12 CM
TDI LATERAL: 0.1
TDI SEPTAL: 0.05
TDI: 0.08
TR MAX PG: 49 MMHG
TRICUSPID ANNULAR PLANE SYSTOLIC EXCURSION: 2.03 CM
TV REST PULMONARY ARTERY PRESSURE: 52 MMHG
WBC # BLD AUTO: 10.25 K/UL (ref 3.9–12.7)

## 2019-03-30 PROCEDURE — 25000003 PHARM REV CODE 250: Performed by: HOSPITALIST

## 2019-03-30 PROCEDURE — 99232 PR SUBSEQUENT HOSPITAL CARE,LEVL II: ICD-10-PCS | Mod: 25,,, | Performed by: INTERNAL MEDICINE

## 2019-03-30 PROCEDURE — 93005 ELECTROCARDIOGRAM TRACING: CPT

## 2019-03-30 PROCEDURE — 99232 SBSQ HOSP IP/OBS MODERATE 35: CPT | Mod: 25,,, | Performed by: INTERNAL MEDICINE

## 2019-03-30 PROCEDURE — 36415 COLL VENOUS BLD VENIPUNCTURE: CPT

## 2019-03-30 PROCEDURE — 85025 COMPLETE CBC W/AUTO DIFF WBC: CPT

## 2019-03-30 PROCEDURE — 25000003 PHARM REV CODE 250: Performed by: INTERNAL MEDICINE

## 2019-03-30 PROCEDURE — 21400001 HC TELEMETRY ROOM

## 2019-03-30 PROCEDURE — 63600175 PHARM REV CODE 636 W HCPCS: Performed by: INTERNAL MEDICINE

## 2019-03-30 PROCEDURE — 63600175 PHARM REV CODE 636 W HCPCS: Performed by: HOSPITALIST

## 2019-03-30 PROCEDURE — 83735 ASSAY OF MAGNESIUM: CPT

## 2019-03-30 PROCEDURE — 93010 EKG 12-LEAD: ICD-10-PCS | Mod: ,,, | Performed by: INTERNAL MEDICINE

## 2019-03-30 PROCEDURE — 93010 ELECTROCARDIOGRAM REPORT: CPT | Mod: ,,, | Performed by: INTERNAL MEDICINE

## 2019-03-30 PROCEDURE — 84100 ASSAY OF PHOSPHORUS: CPT

## 2019-03-30 PROCEDURE — 80048 BASIC METABOLIC PNL TOTAL CA: CPT

## 2019-03-30 RX ORDER — METOPROLOL SUCCINATE 25 MG/1
25 TABLET, EXTENDED RELEASE ORAL DAILY
Status: DISCONTINUED | OUTPATIENT
Start: 2019-03-31 | End: 2019-03-31 | Stop reason: HOSPADM

## 2019-03-30 RX ORDER — INSULIN ASPART 100 [IU]/ML
1-10 INJECTION, SOLUTION INTRAVENOUS; SUBCUTANEOUS
Status: DISCONTINUED | OUTPATIENT
Start: 2019-03-30 | End: 2019-03-31 | Stop reason: HOSPADM

## 2019-03-30 RX ORDER — IBUPROFEN 400 MG/1
400 TABLET ORAL EVERY 6 HOURS PRN
Status: DISCONTINUED | OUTPATIENT
Start: 2019-03-30 | End: 2019-03-31 | Stop reason: HOSPADM

## 2019-03-30 RX ORDER — INSULIN ASPART 100 [IU]/ML
40 INJECTION, SUSPENSION SUBCUTANEOUS 2 TIMES DAILY WITH MEALS
Status: DISCONTINUED | OUTPATIENT
Start: 2019-03-30 | End: 2019-03-31 | Stop reason: HOSPADM

## 2019-03-30 RX ORDER — GLUCAGON 1 MG
1 KIT INJECTION
Status: DISCONTINUED | OUTPATIENT
Start: 2019-03-30 | End: 2019-03-31 | Stop reason: HOSPADM

## 2019-03-30 RX ORDER — NITROGLYCERIN 0.4 MG/1
0.4 TABLET SUBLINGUAL EVERY 5 MIN PRN
Status: DISCONTINUED | OUTPATIENT
Start: 2019-03-30 | End: 2019-03-31 | Stop reason: HOSPADM

## 2019-03-30 RX ORDER — AMLODIPINE BESYLATE 5 MG/1
5 TABLET ORAL DAILY
Status: DISCONTINUED | OUTPATIENT
Start: 2019-03-30 | End: 2019-03-31

## 2019-03-30 RX ORDER — IBUPROFEN 200 MG
16 TABLET ORAL
Status: DISCONTINUED | OUTPATIENT
Start: 2019-03-30 | End: 2019-03-31 | Stop reason: HOSPADM

## 2019-03-30 RX ORDER — IBUPROFEN 200 MG
24 TABLET ORAL
Status: DISCONTINUED | OUTPATIENT
Start: 2019-03-30 | End: 2019-03-31 | Stop reason: HOSPADM

## 2019-03-30 RX ADMIN — INSULIN ASPART 10 UNITS: 100 INJECTION, SOLUTION INTRAVENOUS; SUBCUTANEOUS at 02:03

## 2019-03-30 RX ADMIN — TICAGRELOR 90 MG: 90 TABLET ORAL at 11:03

## 2019-03-30 RX ADMIN — METOPROLOL SUCCINATE 12.5 MG: 25 TABLET, EXTENDED RELEASE ORAL at 08:03

## 2019-03-30 RX ADMIN — ACETAMINOPHEN 650 MG: 325 TABLET, FILM COATED ORAL at 09:03

## 2019-03-30 RX ADMIN — NITROGLYCERIN 0.4 MG: 0.4 TABLET SUBLINGUAL at 03:03

## 2019-03-30 RX ADMIN — INSULIN ASPART 10 UNITS: 100 INJECTION, SOLUTION INTRAVENOUS; SUBCUTANEOUS at 04:03

## 2019-03-30 RX ADMIN — DULOXETINE 60 MG: 30 CAPSULE, DELAYED RELEASE ORAL at 08:03

## 2019-03-30 RX ADMIN — SITAGLIPTIN 100 MG: 25 TABLET, FILM COATED ORAL at 01:03

## 2019-03-30 RX ADMIN — ATORVASTATIN CALCIUM 80 MG: 40 TABLET, FILM COATED ORAL at 09:03

## 2019-03-30 RX ADMIN — INSULIN ASPART 40 UNITS: 100 INJECTION, SUSPENSION SUBCUTANEOUS at 04:03

## 2019-03-30 RX ADMIN — IBUPROFEN 400 MG: 400 TABLET, FILM COATED ORAL at 02:03

## 2019-03-30 RX ADMIN — FLUOXETINE 20 MG: 20 CAPSULE ORAL at 08:03

## 2019-03-30 RX ADMIN — PREDNISONE 50 MG: 5 TABLET ORAL at 08:03

## 2019-03-30 RX ADMIN — INSULIN ASPART 10 UNITS: 100 INJECTION, SOLUTION INTRAVENOUS; SUBCUTANEOUS at 11:03

## 2019-03-30 RX ADMIN — TICAGRELOR 90 MG: 90 TABLET ORAL at 09:03

## 2019-03-30 RX ADMIN — INSULIN ASPART 4 UNITS: 100 INJECTION, SOLUTION INTRAVENOUS; SUBCUTANEOUS at 09:03

## 2019-03-30 RX ADMIN — AMLODIPINE BESYLATE 5 MG: 5 TABLET ORAL at 02:03

## 2019-03-30 RX ADMIN — INSULIN ASPART 10 UNITS: 100 INJECTION, SOLUTION INTRAVENOUS; SUBCUTANEOUS at 06:03

## 2019-03-30 RX ADMIN — PANTOPRAZOLE SODIUM 40 MG: 40 TABLET, DELAYED RELEASE ORAL at 08:03

## 2019-03-30 NOTE — PROGRESS NOTES
"Pt with C/o CP 8/10 non-radiating. EKG dome. Dr. Barbosa notified with new orders received. Pt instructed on new orders and verb understanding. Nitro SL given with relief. Pt states " I burped and I feel better. I feel bad every time I eat pork chops". Pt had pork chops for lunch.  "

## 2019-03-30 NOTE — ASSESSMENT & PLAN NOTE
Continue medicines for secondary prevention as tolerated --currently on beta-blocker and statin  Only on Brilinta monotherapy with aspirin allergy  Desensitization as an outpatient  Ace/ARB held with marginal GFR post dye load

## 2019-03-30 NOTE — NURSING TRANSFER
Nursing Transfer Note      3/30/2019     Transfer transfer to room 339 B from room 278    Transfer via wheelchair    Transfer with cardiac monitoring    Transported by staff    Medicines sent: yes    Chart send with patient: Yes    Notified: spouse    Patient reassessed at:  (date, time)    Upon arrival to floor: cardiac monitor applied, patient oriented to room, call bell in reach and bed in lowest position

## 2019-03-30 NOTE — PLAN OF CARE
03/30/19 1528   Discharge Assessment   Assessment Type Discharge Planning Assessment   Confirmed/corrected address and phone number on facesheet? Yes   Assessment information obtained from? Patient   Communicated expected length of stay with patient/caregiver no   Prior to hospitilization cognitive status: Alert/Oriented   Prior to hospitalization functional status: Independent   Current cognitive status: Alert/Oriented   Current Functional Status: Independent   Facility Arrived From: Home   Lives With spouse   Able to Return to Prior Arrangements yes   Is patient able to care for self after discharge? Yes   Who are your caregiver(s) and their phone number(s)? [place]-spouse: [phone]; sister also helps out   Patient's perception of discharge disposition home or selfcare   Readmission Within the Last 30 Days no previous admission in last 30 days   Patient currently being followed by outpatient case management? No   Equipment Currently Used at Home none   Do you have any problems affording any of your prescribed medications? No   Is the patient taking medications as prescribed? yes   Does the patient have transportation home? Yes   Transportation Anticipated family or friend will provide   Does the patient receive services at the [facility]? No   Discharge Plan A Home with family   Discharge Plan B Other  (TBD)   DME Needed Upon Discharge  other (see comments)  (TBD)   Patient/Family in Agreement with Plan yes   SW Role explained to patient; two patient identifiers recognized; SW contact information placed on Communication board. Discussed patient managing health care at home; determined who would be helping patient at home with recovery: spouse, sister, daughter will help with recovery    PCP: Kenneth Luu MD [id number] [facility] / [place] [address] prefers appointments anytime    Extended Emergency Contact Information  Primary Emergency Contact: Angel Contreras  Address: [address] [place] [address]  [place]  Home Phone: [phone]  Mobile Phone: [phone]  Relation: Spouse     [facility] [place], [place] - [address]  [address] [place] [address]  Phone: [phone] Fax: [phone]    Payor: [employer] MANAGED MEDICARE / Plan: [employer] [facility] HEALTH / Product Type: Medicare Advantage /

## 2019-03-30 NOTE — SUBJECTIVE & OBJECTIVE
Interval History: pt eating lunch, denies CP    Review of Systems   Constitutional: Negative.    HENT: Negative.    Eyes: Negative.    Respiratory: Negative.    Cardiovascular: Negative.    Gastrointestinal: Negative.    Genitourinary: Negative.    Musculoskeletal: Negative.    Neurological: Negative.    Hematological: Bruises/bleeds easily.     Objective:     Vital Signs (Most Recent):  Temp: 98.2 °F (36.8 °C) (03/30/19 1101)  Pulse: 91 (03/30/19 1130)  Resp: (!) 38 (03/30/19 1130)  BP: (!) 141/70 (03/30/19 1130)  SpO2: 99 % (03/30/19 1130) Vital Signs (24h Range):  Temp:  [98.2 °F (36.8 °C)-98.9 °F (37.2 °C)] 98.2 °F (36.8 °C)  Pulse:  [77-93] 91  Resp:  [12-38] 38  SpO2:  [95 %-100 %] 99 %  BP: (111-205)/(56-99) 141/70     Weight: 96.8 kg (213 lb 6.5 oz)  Body mass index is 31.51 kg/m².    Intake/Output Summary (Last 24 hours) at 3/30/2019 1225  Last data filed at 3/30/2019 0900  Gross per 24 hour   Intake 417.17 ml   Output 1050 ml   Net -632.83 ml      Physical Exam   Constitutional: She is oriented to person, place, and time. She appears well-developed and well-nourished. No distress.   HENT:   Head: Normocephalic and atraumatic.   Mouth/Throat: Oropharynx is clear and moist.   Eyes: Pupils are equal, round, and reactive to light. EOM are normal.   Neck: Neck supple. No JVD present.   Cardiovascular: Normal rate, regular rhythm, normal heart sounds and intact distal pulses.   Pulmonary/Chest: Effort normal and breath sounds normal. No respiratory distress.   Abdominal: Soft. Bowel sounds are normal. She exhibits no distension.   Musculoskeletal: Normal range of motion. She exhibits no edema.   Neurological: She is alert and oriented to person, place, and time.   Skin: Capillary refill takes 2 to 3 seconds.   Psychiatric: She has a normal mood and affect. Her behavior is normal.       Significant Labs:   POCT Glucose:   Recent Labs   Lab 03/30/19  0219 03/30/19  0434 03/30/19  0634   POCTGLUCOSE 455* 423* 410*        Significant Imaging: I have reviewed all pertinent imaging results/findings within the past 24 hours.

## 2019-03-30 NOTE — PROGRESS NOTES
"[facility] Hospital Medicine  Progress Note    Patient Name: Lorena Contreras  MRN: [id number]  Patient Class: IP- Inpatient   Admission Date: 3/29/2019  Length of Stay: 1 days  Attending Physician: Bladimir Barbosa MD  Primary Care Provider: Kenneth Luu MD        Subjective:     Principal Problem:Acute inferior myocardial infarction    HPI:  Lorena Contreras is a 68 y.o. female that (in part)  has a past medical history of Anxiety, Diabetes mellitus, type II, Follicular lymphoma, HTN (hypertension), and Restless leg syndrome.  has a past surgical history that includes Knee surgery; Elbow surgery; Colonoscopy (11/07/2012); and Esophagogastroduodenoscopy (11/07/2012). Presents to [facility] Emergency Department complaining of chest pain. Workup revealed evidence of an acute ST-elevation MI.  She was taking for emergent left heart catheterization with femoral access where she underwent PCI and stent placement of the right coronary artery on March 29, 2019:    "Patient has serial mid 99% eccentric lesions consistent with plaque rupture site and abnormal EKG findings.... We initially pre-dilated with 3.0 balloon.  We placed a 3.0 by 12 mm resolute kenya stent in the midportion of the vessel.  We overlapped that with a 3.5 x 15 mm resolute kenya stent in the more proximal portion mid RCA.  Good result was achieved with MADINA 3 flow through the vessel.  Lad has a mid 90% stenosis and the left circumflex as well as the long 95% lesion as well."     Patient resting well in the ICU and looking forward to being able to stand up after 4:00 a.m..  She reports some mild back pain from lying flat.  She normally lies on her side.  Otherwise no complaints.  Denies chest pain, palpitations, diaphoresis, or chills.  No significant pain at her the femoral access site.        Hospital medicine has been asked to see the patient for further evaluation and treatment.            Hospital " Course:  Hospital medicine following for diabetes management. Pt received steroid prep for Angiogram and blood sugars in 400 range. Given 15 units levemir x one dose. Will resume home 70/30 insulin 40u BID and januvia 100mg daily. Increase SSI to moderate dose. Ok to resume metformin on dc home. Her A1c is trending down and current home regimen seems to be working well.     Interval History: pt eating lunch, denies CP    Review of Systems   Constitutional: Negative.    HENT: Negative.    Eyes: Negative.    Respiratory: Negative.    Cardiovascular: Negative.    Gastrointestinal: Negative.    Genitourinary: Negative.    Musculoskeletal: Negative.    Neurological: Negative.    Hematological: Bruises/bleeds easily.     Objective:     Vital Signs (Most Recent):  Temp: 98.2 °F (36.8 °C) (03/30/19 1101)  Pulse: 91 (03/30/19 1130)  Resp: (!) 38 (03/30/19 1130)  BP: (!) 141/70 (03/30/19 1130)  SpO2: 99 % (03/30/19 1130) Vital Signs (24h Range):  Temp:  [98.2 °F (36.8 °C)-98.9 °F (37.2 °C)] 98.2 °F (36.8 °C)  Pulse:  [77-93] 91  Resp:  [12-38] 38  SpO2:  [95 %-100 %] 99 %  BP: (111-205)/(56-99) 141/70     Weight: 96.8 kg (213 lb 6.5 oz)  Body mass index is 31.51 kg/m².    Intake/Output Summary (Last 24 hours) at 3/30/2019 1225  Last data filed at 3/30/2019 0900  Gross per 24 hour   Intake 417.17 ml   Output 1050 ml   Net -632.83 ml      Physical Exam   Constitutional: She is oriented to person, place, and time. She appears well-developed and well-nourished. No distress.   HENT:   Head: Normocephalic and atraumatic.   Mouth/Throat: Oropharynx is clear and moist.   Eyes: Pupils are equal, round, and reactive to light. EOM are normal.   Neck: Neck supple. No JVD present.   Cardiovascular: Normal rate, regular rhythm, normal heart sounds and intact distal pulses.   Pulmonary/Chest: Effort normal and breath sounds normal. No respiratory distress.   Abdominal: Soft. Bowel sounds are normal. She exhibits no distension.    Musculoskeletal: Normal range of motion. She exhibits no edema.   Neurological: She is alert and oriented to person, place, and time.   Skin: Capillary refill takes 2 to 3 seconds.   Psychiatric: She has a normal mood and affect. Her behavior is normal.       Significant Labs:   POCT Glucose:   Recent Labs   Lab 03/30/19  0219 03/30/19  0434 03/30/19  0634   POCTGLUCOSE 455* 423* 410*       Significant Imaging: I have reviewed all pertinent imaging results/findings within the past 24 hours.    Assessment/Plan:      * Acute inferior myocardial infarction  Per primary service       Coronary artery disease involving native coronary artery of native heart  Per primary service       Uncontrolled type 2 diabetes mellitus with diabetic polyneuropathy, with long-term current use of insulin  Resume 70/30 home insulin + januvia  Ok to restart metformin tomorrow on dc home  SSI   Diabetic diet  Anticipate blood sugars will remain elevated x 2-3 days with steroid prep     Hyperlipidemia associated with type 2 diabetes mellitus  Resume statin     Hypertension associated with diabetes  Per primary        VTE Risk Mitigation (From admission, onward)    None          Critical care time spent on the evaluation and treatment of severe organ dysfunction, review of pertinent labs and imaging studies, discussions with consulting providers and discussions with patient/family: 30 minutes.    Felicita Novoa MD  Department of Hospital Medicine   [facility]

## 2019-03-30 NOTE — HOSPITAL COURSE
Hospital medicine following for diabetes management. Pt received steroid prep for Angiogram and blood sugars in 400 range. Given 15 units levemir x one dose. Will resume home 70/30 insulin 40u BID and januvia 100mg daily. Increase SSI to moderate dose. Ok to resume metformin on dc home. Her A1c is trending down and current home regimen seems to be working well.

## 2019-03-30 NOTE — PLAN OF CARE
Problem: Adult Inpatient Plan of Care  Goal: Plan of Care Review  Outcome: Ongoing (interventions implemented as appropriate)  Pt remains in ICU overnight post cath procedure. Dressing remains in place @ Rt Groin with minimal drainage. Benoit placed for urinary retention per MD orders. Glucose closely monitored with sliding scale used per MD orders. Benadryl given for insomnia per MD. Aggrastat stopped @ 0428 per order. Pt remains free of any falls, injuries, or new skin break down during this shift.

## 2019-03-30 NOTE — ASSESSMENT & PLAN NOTE
Status post PCI the RCA  Possible staged intervention of the LAD and left circumflex as an outpatient  Follow-up echocardiogram

## 2019-03-30 NOTE — PLAN OF CARE
"Problem: Adult Inpatient Plan of Care  Goal: Plan of Care Review  Outcome: Ongoing (interventions implemented as appropriate)  Pt has remained with SR- ST at times. Pt had CP x's 1 today but relieved with Nitro x's 1 and also pt "burped". Pt skin intact, right groin with out bleeding or hematoma. CBG has remained > 500 but pt's home meds resumed and prednisone d/c'd.Pt to transfer to room 339B. Pt remains on room air. 's-170. HR 80's.      "

## 2019-03-30 NOTE — HPI
"Lorena Contreras is a 68 y.o. female that (in part)  has a past medical history of Anxiety, Diabetes mellitus, type II, Follicular lymphoma, HTN (hypertension), and Restless leg syndrome.  has a past surgical history that includes Knee surgery; Elbow surgery; Colonoscopy (11/07/2012); and Esophagogastroduodenoscopy (11/07/2012). Presents to [facility] Emergency Department complaining of chest pain. Workup revealed evidence of an acute ST-elevation MI.  She was taking for emergent left heart catheterization with femoral access where she underwent PCI and stent placement of the right coronary artery on March 29, 2019:    "Patient has serial mid 99% eccentric lesions consistent with plaque rupture site and abnormal EKG findings.... We initially pre-dilated with 3.0 balloon.  We placed a 3.0 by 12 mm resolute kenya stent in the midportion of the vessel.  We overlapped that with a 3.5 x 15 mm resolute kenya stent in the more proximal portion mid RCA.  Good result was achieved with MADINA 3 flow through the vessel.  Lad has a mid 90% stenosis and the left circumflex as well as the long 95% lesion as well."     Patient resting well in the ICU and looking forward to being able to stand up after 4:00 a.m..  She reports some mild back pain from lying flat.  She normally lies on her side.  Otherwise no complaints.  Denies chest pain, palpitations, diaphoresis, or chills.  No significant pain at her the femoral access site.        Hospital medicine has been asked to see the patient for further evaluation and treatment.          "

## 2019-03-30 NOTE — PROGRESS NOTES
[facility]  Cardiology  Progress Note    Patient Name: Lorena Contreras  MRN: [id number]  Admission Date: 3/29/2019  Hospital Length of Stay: 1 days  Code Status: Prior   Attending Physician: Bladimir Barbosa MD   Primary Care Physician: Kenneth Luu MD  Expected Discharge Date:   Principal Problem:Acute inferior myocardial infarction    Subjective:     Hospital Course:   3-29:  Admitted with inferior ST-elevation MI status post PCI of the RCA    Interval History:  No complaints this a.m. and feels much better now that she is able to move.  She received ibuprofen for back yesterday.    Telemetry:  Normal sinus rhythm    Review of Systems   All other systems reviewed and are negative.    Objective:     Vital Signs (Most Recent):  Temp: 98.2 °F (36.8 °C) (03/30/19 1101)  Pulse: 91 (03/30/19 1130)  Resp: (!) 38 (03/30/19 1130)  BP: (!) 141/70 (03/30/19 1130)  SpO2: 99 % (03/30/19 1130) Vital Signs (24h Range):  Temp:  [98.2 °F (36.8 °C)-98.9 °F (37.2 °C)] 98.2 °F (36.8 °C)  Pulse:  [77-93] 91  Resp:  [12-38] 38  SpO2:  [95 %-100 %] 99 %  BP: (111-205)/(56-99) 141/70     Weight: 96.8 kg (213 lb 6.5 oz)  Body mass index is 31.51 kg/m².     SpO2: 99 %  O2 Device (Oxygen Therapy): room air      Intake/Output Summary (Last 24 hours) at 3/30/2019 1330  Last data filed at 3/30/2019 0900  Gross per 24 hour   Intake 417.17 ml   Output 1050 ml   Net -632.83 ml       Lines/Drains/Airways     Drain                 Urethral Catheter 03/29/19 2145 Non-latex 16 Fr. less than 1 day          Peripheral Intravenous Line                 Peripheral IV - Single Lumen 03/29/19 1445 Right Forearm less than 1 day         Peripheral IV - Single Lumen 03/29/19 1450 Right Antecubital less than 1 day                Physical Exam   Constitutional: She is oriented to person, place, and time. She appears well-developed and well-nourished.   HENT:   Head: Normocephalic and atraumatic.   Eyes: Pupils are equal, round, and reactive  to light. Conjunctivae and EOM are normal.   Neck: Normal range of motion. Neck supple. No thyromegaly present.   Cardiovascular: Normal rate and regular rhythm.   No murmur heard.  Pulmonary/Chest: Effort normal and breath sounds normal. No respiratory distress.   Abdominal: Soft. Bowel sounds are normal.   Musculoskeletal: She exhibits no edema.   No hematoma or bruit at the right groin access site   Neurological: She is alert and oriented to person, place, and time.   Skin: Skin is warm and dry.   Psychiatric: She has a normal mood and affect. Her behavior is normal.       Significant Labs:   CMP   Recent Labs   Lab 03/29/19  1449 03/30/19  0414   * 133*   K 4.3 5.2*   CL 98 100   CO2 23 21*   * 427*   BUN 27* 29*   CREATININE 1.3 1.3   CALCIUM 10.4 9.3   PROT 8.7*  --    ALBUMIN 4.0  --    BILITOT 0.5  --    ALKPHOS 111  --    AST 51*  --    ALT 43  --    ANIONGAP 13 12   ESTGFRAFRICA 49* 49*   EGFRNONAA 42* 42*   , CBC   Recent Labs   Lab 03/29/19  1449 03/30/19  0414   WBC 14.42* 10.25   HGB 13.0 10.7*   HCT 39.8 32.4*   * 258   , INR   Recent Labs   Lab 03/29/19  1449   INR 1.0   , Lipid Panel No results for input(s): CHOL, HDL, LDLCALC, TRIG, CHOLHDL in the last 48 hours. and Troponin   Recent Labs   Lab 03/29/19  1449   TROPONINI 0.362*       Significant Imaging: Echocardiogram: Transthoracic echo (TTE) complete (Cupid Only):  Pending    Assessment and Plan:     Brief HPI:  Stable post PCI.  IIb IIIa discontinued and currently stable.    * Acute inferior myocardial infarction  Status post PCI the RCA  Possible staged intervention of the LAD and left circumflex as an outpatient  Follow-up echocardiogram  Okay to DC steroids    Coronary artery disease involving native coronary artery of native heart  Continue medicines for secondary prevention as tolerated --currently on beta-blocker and statin  Only on Brilinta monotherapy with aspirin allergy  Desensitization as an outpatient  Ace/ARB  held with marginal GFR post dye load    Hypertension associated with diabetes  Add back amlodipine    Hyperlipidemia associated with type 2 diabetes mellitus  High-dose statin    Uncontrolled type 2 diabetes mellitus with diabetic polyneuropathy, with long-term current use of insulin  Appreciated IM assistance      Transfer to telemetry later today    VTE Risk Mitigation (From admission, onward)    None          Bladimir Barbosa MD  Cardiology  [facility]

## 2019-03-30 NOTE — SUBJECTIVE & OBJECTIVE
Interval History:  No complaints this a.m. and feels much better now that she is able to move.  She received ibuprofen for back yesterday.    Telemetry:  Normal sinus rhythm    Review of Systems   All other systems reviewed and are negative.    Objective:     Vital Signs (Most Recent):  Temp: 98.2 °F (36.8 °C) (03/30/19 1101)  Pulse: 91 (03/30/19 1130)  Resp: (!) 38 (03/30/19 1130)  BP: (!) 141/70 (03/30/19 1130)  SpO2: 99 % (03/30/19 1130) Vital Signs (24h Range):  Temp:  [98.2 °F (36.8 °C)-98.9 °F (37.2 °C)] 98.2 °F (36.8 °C)  Pulse:  [77-93] 91  Resp:  [12-38] 38  SpO2:  [95 %-100 %] 99 %  BP: (111-205)/(56-99) 141/70     Weight: 96.8 kg (213 lb 6.5 oz)  Body mass index is 31.51 kg/m².     SpO2: 99 %  O2 Device (Oxygen Therapy): room air      Intake/Output Summary (Last 24 hours) at 3/30/2019 1330  Last data filed at 3/30/2019 0900  Gross per 24 hour   Intake 417.17 ml   Output 1050 ml   Net -632.83 ml       Lines/Drains/Airways     Drain                 Urethral Catheter 03/29/19 2145 Non-latex 16 Fr. less than 1 day          Peripheral Intravenous Line                 Peripheral IV - Single Lumen 03/29/19 1445 Right Forearm less than 1 day         Peripheral IV - Single Lumen 03/29/19 1450 Right Antecubital less than 1 day                Physical Exam   Constitutional: She is oriented to person, place, and time. She appears well-developed and well-nourished.   HENT:   Head: Normocephalic and atraumatic.   Eyes: Pupils are equal, round, and reactive to light. Conjunctivae and EOM are normal.   Neck: Normal range of motion. Neck supple. No thyromegaly present.   Cardiovascular: Normal rate and regular rhythm.   No murmur heard.  Pulmonary/Chest: Effort normal and breath sounds normal. No respiratory distress.   Abdominal: Soft. Bowel sounds are normal.   Musculoskeletal: She exhibits no edema.   No hematoma or bruit at the right groin access site   Neurological: She is alert and oriented to person, place, and  time.   Skin: Skin is warm and dry.   Psychiatric: She has a normal mood and affect. Her behavior is normal.       Significant Labs:   CMP   Recent Labs   Lab 03/29/19  1449 03/30/19  0414   * 133*   K 4.3 5.2*   CL 98 100   CO2 23 21*   * 427*   BUN 27* 29*   CREATININE 1.3 1.3   CALCIUM 10.4 9.3   PROT 8.7*  --    ALBUMIN 4.0  --    BILITOT 0.5  --    ALKPHOS 111  --    AST 51*  --    ALT 43  --    ANIONGAP 13 12   ESTGFRAFRICA 49* 49*   EGFRNONAA 42* 42*   , CBC   Recent Labs   Lab 03/29/19  1449 03/30/19  0414   WBC 14.42* 10.25   HGB 13.0 10.7*   HCT 39.8 32.4*   * 258   , INR   Recent Labs   Lab 03/29/19  1449   INR 1.0   , Lipid Panel No results for input(s): CHOL, HDL, LDLCALC, TRIG, CHOLHDL in the last 48 hours. and Troponin   Recent Labs   Lab 03/29/19  1449   TROPONINI 0.362*       Significant Imaging: Echocardiogram: Transthoracic echo (TTE) complete (Cupid Only):  Pending

## 2019-03-30 NOTE — ASSESSMENT & PLAN NOTE
Resume 70/30 home insulin + januvia  Ok to restart metformin tomorrow on dc home  SSI   Diabetic diet  Anticipate blood sugars will remain elevated x 2-3 days with steroid prep

## 2019-03-30 NOTE — NURSING
2130- pt complains of not being able to urinate even with pure wick in place. Bladder scan done showing 345 ml of urine. EICU called, order to place indwelling chong received per MD Carrillo. Also received order for PRN benadryl for insomnia at this time per pt's request.    2230- pt's , Called eICU, informed MD Carrillo. Was told to given pt's PRN aspart per sliding scale order and recheck CBG in a few hours.     0215: pt request something for back pain. Ibuprofen ordered per MD Morales. Pt states she is allergic to ASA but takes ibuprofen at home with no problems. Allergies verified with pharmacy.    CBG= 455  0230= Md Carrillo notified of elevated CBG, ordered to give 10 units subcutaneous aspart. Will recheck in a few hours.

## 2019-03-30 NOTE — PLAN OF CARE
Problem: Adult Inpatient Plan of Care  Goal: Plan of Care Review  Outcome: Ongoing (interventions implemented as appropriate)  Patient received from cath lab--post stent placement x2---denies chest pain at present.   Aggrastat drip infusing.  Right groin site with only scant amount of sanginous drainage--no hematoma.  Family at bedside

## 2019-03-30 NOTE — H&P
"[facility] Hospital Medicine  History & Physical    Patient Name: Lorena Contreras  MRN: [id number]  Admission Date: 03/30/2019  Attending Physician: Greg Morales MD, MPH      PCP:     Kenneth Luu MD    Reason for consult:     Hospital Medicine consulted for medical management status post STEMI.    Chief Complaint   Patient presents with    Chest Pain       HISTORY OF PRESENT ILLNESS:     Lorena Contreras is a 68 y.o. female that (in part)  has a past medical history of Anxiety, Diabetes mellitus, type II, Follicular lymphoma, HTN (hypertension), and Restless leg syndrome.  has a past surgical history that includes Knee surgery; Elbow surgery; Colonoscopy (11/07/2012); and Esophagogastroduodenoscopy (11/07/2012). Presents to [facility] Emergency Department complaining of chest pain. Workup revealed evidence of an acute ST-elevation MI.  She was taking for emergent left heart catheterization with femoral access where she underwent PCI and stent placement of the right coronary artery on March 29, 2019:    "Patient has serial mid 99% eccentric lesions consistent with plaque rupture site and abnormal EKG findings.... We initially pre-dilated with 3.0 balloon.  We placed a 3.0 by 12 mm resolute kenya stent in the midportion of the vessel.  We overlapped that with a 3.5 x 15 mm resolute kenya stent in the more proximal portion mid RCA.  Good result was achieved with MADINA 3 flow through the vessel.  Lad has a mid 90% stenosis and the left circumflex as well as the long 95% lesion as well."    Patient resting well in the ICU and looking forward to being able to stand up after 4:00 a.m..  She reports some mild back pain from lying flat.  She normally lies on her side.  Otherwise no complaints.  Denies chest pain, palpitations, diaphoresis, or chills.  No significant pain at her the femoral access site.      Hospital medicine has been asked to see the patient for further " evaluation and treatment.       REVIEW OF SYSTEMS:     -- Constitutional: No fever or chills.  -- Eyes: No visual changes, diplopia, pain, tearing, blind spots, or discharge.   -- Ears, nose, mouth, throat, and face: No congestion, sore throat, epistaxis, d/c, bleeding gums, neck stiffness masses, or dental issues.  -- Respiratory: No cough, shortness of breath, hemoptysis, stridor, wheezing, or night sweats.  -- Cardiovascular:  Initially with chest pain and underwent treatment for NSTEMI as noted above in the HPI.  Described as a dull heavy chest discomfort.  -- Gastrointestinal: No vomiting, abdominal pain, hematemesis, melena, dyspepsia, or change in bowel habits.  -- Genitourinary: No hematuria, dysuria, frequency, urgency, nocturia, polyuria, stones, or incontinence.  -- Integument/breast: No rash, pruritis, pigmentation changes, dryness, or changes in hair  -- Hematologic/lymphatic: No easy bruising or lymphadenopathy.   -- Musculoskeletal: No acute arthralgias, acute myalgias, joint swelling, acute limitations of ROM, or acute muscular weakness.  -- Neurological: No seizures, headaches, incoordination, paraesthesias, ataxia, vertigo, or tremors.  -- Behavioral/Psych: No auditory or visual hallucinations, depression, or suicidal/homicidal ideations.  -- Endocrine: No heat or cold intolerance, polydipsia, or unintentional weight gain / loss.  -- Allergy/Immunologic: No recurrent infections or adverse reaction to food, insects, or difficulty breathing.        PAST MEDICAL / SURGICAL HISTORY:     Past Medical History:   Diagnosis Date    Anxiety     Diabetes mellitus, type II     Follicular lymphoma     HTN (hypertension)     Restless leg syndrome      Past Surgical History:   Procedure Laterality Date    COLONOSCOPY  11/07/2012    Colon polyp found; repeat in 5 years    ELBOW SURGERY      ESOPHAGOGASTRODUODENOSCOPY  11/07/2012    atrophic gastritis, H pylori testing negative    KNEE SURGERY            FAMILY HISTORY:     Family History   Problem Relation Age of Onset    Cancer Mother     Heart disease Mother     Cancer Father         lung    Diabetes Sister     Hypertension Sister     Stroke Neg Hx     Hyperlipidemia Neg Hx          SOCIAL HISTORY:     Social History     Socioeconomic History    Marital status:      Spouse name: None    Number of children: None    Years of education: None    Highest education level: None   Occupational History    None   Social Needs    Financial resource strain: None    Food insecurity:     Worry: None     Inability: None    Transportation needs:     Medical: None     Non-medical: None   Tobacco Use    Smoking status: Never Smoker    Smokeless tobacco: Never Used   Substance and Sexual Activity    Alcohol use: No    Drug use: No    Sexual activity: Not Currently   Lifestyle    Physical activity:     Days per week: None     Minutes per session: None    Stress: None   Relationships    Social connections:     Talks on phone: None     Gets together: None     Attends Latter day service: None     Active member of club or organization: None     Attends meetings of clubs or organizations: None     Relationship status: None    Intimate partner violence:     Fear of current or ex partner: None     Emotionally abused: None     Physically abused: None     Forced sexual activity: None   Other Topics Concern    None   Social History Narrative    None         ALLERGIES:       Review of patient's allergies indicates:   Allergen Reactions    Novolin 70/30 (semi-synthetic) Nausea And Vomiting     Severe vomiting on Relion 70/30    Shellfish containing products Swelling    Victoza [liraglutide] Nausea And Vomiting    Glipizide Nausea Only    Asa [aspirin]     Citric acid     Codeine     Egg derived     Iodine and iodide containing products     Rituxan [rituximab]     Soy     Sulfa (sulfonamide antibiotics)     Talwin [pentazocine lactate]      "Zoloft [sertraline]          HOME MEDICATIONS:     Prior to Admission medications    Medication Sig Start Date End Date Taking? Authorizing Provider   amLODIPine (NORVASC) 10 MG tablet TAKE ONE TABLET BY MOUTH ONCE DAILY 12/11/18  Yes Kenneth Luu MD   atenolol (TENORMIN) 50 MG tablet Take 1 tablet (50 mg total) by mouth 2 (two) times daily. 11/26/18  Yes Kenneth Luu MD   blood sugar diagnostic (FREESTYLE TEST) Strp 1 strip by Misc.(Non-Drug; Combo Route) route 4 (four) times daily. 6/18/13  Yes Historical Provider, MD   cholecalciferol, vitamin D3, (VITAMIN D3) 2,000 unit Cap Take 2 capsules by mouth 2 (two) times daily.   Yes Historical Provider, MD   esomeprazole (NEXIUM) 20 MG capsule Take 20 mg by mouth before breakfast.   Yes Historical Provider, MD   FLUoxetine 20 MG capsule Take 1 capsule (20 mg total) by mouth once daily. 2/18/19 2/18/20 Yes Kenneth Luu MD   furosemide (LASIX) 20 MG tablet Take 1 tablet (20 mg total) by mouth daily as needed (leg swelling). 2/1/18 3/29/19 Yes Kenneth Luu MD   hydroCHLOROthiazide (HYDRODIURIL) 25 MG tablet TAKE 1 TABLET BY MOUTH ONCE DAILY 2/20/19  Yes Kenneth Luu MD   insulin aspart protamine-insulin aspart (NOVOLOG MIX 70-30FLEXPEN U-100) 100 unit/mL (70-30) InPn pen Inject 30 Units into the skin 2 (two) times daily before meals. 11/30/18 11/30/19 Yes Char Marmolejo APRN,ANP-C   lancets 32 gauge Misc 1 lancet by Misc.(Non-Drug; Combo Route) route 4 (four) times daily. 8/23/16  Yes Kenneth Luu MD   magnesium oxide (MAG-OX) 400 mg tablet Take 400 mg by mouth once daily.   Yes Historical Provider, MD   metFORMIN (GLUCOPHAGE) 1000 MG tablet TAKE ONE TABLET BY MOUTH TWICE DAILY WITH MEALS 9/13/18  Yes Kenneth Luu MD   pen needle, diabetic 32 gauge x 5/32" Ndle Use with Novolog Mix Flexpens 11/30/18  Yes JAYCEE Hernández,ANP-C   tolnaftate (TINACTIN) 1 % cream Apply topically 2 (two) times daily.  Patient taking differently: Apply topically 2 (two) times " "daily as needed.  10/25/17  Yes Sandra Ferrell MD   bismuth tribrom-petrolatum,wh (XEROFORM) 5 X 9 " Bndg Apply dressing to wound daily 11/28/16   Kenneth Luu MD   DULoxetine (CYMBALTA) 60 MG capsule Take 1 capsule (60 mg total) by mouth once daily. 11/30/18 11/30/19  JAYCEE Hernández,ANP-C   meclizine (ANTIVERT) 25 mg tablet Take 1 tablet (25 mg total) by mouth 3 (three) times daily as needed. 8/7/18   Cherelle Delgado, PA-C   SITagliptin (JANUVIA) 100 MG Tab Take 1 tablet (100 mg total) by mouth once daily. 9/24/18 9/24/19  Delfino Roberts MD   triamcinolone acetonide 0.1% (KENALOG) 0.1 % cream APPLY  CREAM EXTERNALLY TO AFFECTED AREA TWICE DAILY AS NEEDED 3/14/18   Kenneth Luu MD          HOSPITAL MEDICATIONS:     Scheduled Meds:    atorvastatin  80 mg Oral QHS    DULoxetine  60 mg Oral Daily    FLUoxetine  20 mg Oral Daily    insulin detemir U-100  15 Units Subcutaneous QHS    metoprolol succinate  12.5 mg Oral Daily    pantoprazole  40 mg Oral Daily    predniSONE  50 mg Oral Q12H     Continuous Infusions:    bivulirudin (ANGIOMAX) infusion Stopped (03/29/19 1619)     PRN Meds: acetaminophen, bivulirudin (ANGIOMAX) infusion, dextrose 50%, dextrose 50%, diphenhydrAMINE, diphenhydrAMINE, glucagon (human recombinant), glucose, glucose, ibuprofen, insulin aspart U-100, mirtazapine, ondansetron, polyethylene glycol      PHYSICAL EXAM:     Wt Readings from Last 1 Encounters:   03/30/19 0303 96.8 kg (213 lb 6.5 oz)   03/29/19 1522 93.9 kg (207 lb)     Body mass index is 31.51 kg/m².  Vitals:    03/30/19 0303 03/30/19 0315 03/30/19 0330 03/30/19 0400   BP:   112/61 125/61   Pulse:  82  79   Resp:  18  20   Temp:  98.7 °F (37.1 °C)     TempSrc:  Oral     SpO2:  97%  96%   Weight: 96.8 kg (213 lb 6.5 oz)      Height: 5' 9" (1.753 m)             -- General appearance: well developed. appears stated age   -- Head: normocephalic, atraumatic   -- Eyes: conjunctivae clear. Extraocular muscles intact  -- " Nose: Nares normal. Septum midline.   -- Mouth/Throat: lips, mucosa, and tongue normal. no throat erythema.   -- Neck: supple, symmetrical, trachea midline, no JVD and thyroid not grossly enlarged, appears symmetric  -- Lungs: clear to auscultation bilaterally. normal respiratory effort. No use of accessory muscles.   -- Chest wall: no tenderness. equal bilateral chest rise   -- Heart:  Increased rate and regular rhythm. S1, S2 normal.  no click, rub or gallop   -- Abdomen: soft, non-tender, non-distended, non-tympanic; bowel sounds normal; no masses  -- Extremities: no cyanosis, clubbing or edema.   -- Pulses: 2+ and symmetric   -- Skin: color normal, texture normal, turgor normal. No rashes or lesions.   -- Neurologic: Normal strength and tone. No focal numbness or weakness. CNII-XII intact. [place] coma scale: eyes open spontaneously-4, oriented & converses-5, obeys commands-6.      LABORATORY STUDIES:     Recent Results (from the past 36 hour(s))   Comprehensive metabolic panel    Collection Time: 03/29/19  2:49 PM   Result Value Ref Range    Sodium 134 (L) 136 - 145 mmol/L    Potassium 4.3 3.5 - 5.1 mmol/L    Chloride 98 95 - 110 mmol/L    CO2 23 23 - 29 mmol/L    Glucose 349 (H) 70 - 110 mg/dL    BUN, Bld 27 (H) 8 - 23 mg/dL    Creatinine 1.3 0.5 - 1.4 mg/dL    Calcium 10.4 8.7 - 10.5 mg/dL    Total Protein 8.7 (H) 6.0 - 8.4 g/dL    Albumin 4.0 3.5 - 5.2 g/dL    Total Bilirubin 0.5 0.1 - 1.0 mg/dL    Alkaline Phosphatase 111 55 - 135 U/L    AST 51 (H) 10 - 40 U/L    ALT 43 10 - 44 U/L    Anion Gap 13 8 - 16 mmol/L    eGFR if African American 49 (A) >60 mL/min/1.73 m^2    eGFR if non African American 42 (A) >60 mL/min/1.73 m^2   CBC auto differential    Collection Time: 03/29/19  2:49 PM   Result Value Ref Range    WBC 14.42 (H) 3.90 - 12.70 K/uL    RBC 4.66 4.00 - 5.40 M/uL    Hemoglobin 13.0 12.0 - 16.0 g/dL    Hematocrit 39.8 37.0 - 48.5 %    MCV 85 82 - 98 fL    MCH 27.9 27.0 - 31.0 pg    MCHC 32.7 32.0 -  36.0 g/dL    RDW 13.6 11.5 - 14.5 %    Platelets 362 (H) 150 - 350 K/uL    MPV 13.3 (H) 9.2 - 12.9 fL    Gran # (ANC) 10.7 (H) 1.8 - 7.7 K/uL    Lymph # 2.6 1.0 - 4.8 K/uL    Mono # 0.8 0.3 - 1.0 K/uL    Eos # 0.2 0.0 - 0.5 K/uL    Baso # 0.11 0.00 - 0.20 K/uL    Gran% 74.3 (H) 38.0 - 73.0 %    Lymph% 17.8 (L) 18.0 - 48.0 %    Mono% 5.6 4.0 - 15.0 %    Eosinophil% 1.5 0.0 - 8.0 %    Basophil% 0.8 0.0 - 1.9 %    Differential Method Automated    APTT    Collection Time: 03/29/19  2:49 PM   Result Value Ref Range    aPTT 31.5 21.0 - 32.0 sec   Protime-INR    Collection Time: 03/29/19  2:49 PM   Result Value Ref Range    Prothrombin Time 10.5 9.0 - 12.5 sec    INR 1.0 0.8 - 1.2   Troponin I    Collection Time: 03/29/19  2:49 PM   Result Value Ref Range    Troponin I 0.362 (H) 0.000 - 0.026 ng/mL   Brain natriuretic peptide    Collection Time: 03/29/19  2:49 PM   Result Value Ref Range     (H) 0 - 99 pg/mL   Magnesium    Collection Time: 03/29/19  2:49 PM   Result Value Ref Range    Magnesium 1.2 (L) 1.6 - 2.6 mg/dL   POCT glucose    Collection Time: 03/29/19 10:13 PM   Result Value Ref Range    POCT Glucose 491 (HH) 70 - 110 mg/dL   POCT glucose    Collection Time: 03/29/19 10:16 PM   Result Value Ref Range    POCT Glucose 493 (HH) 70 - 110 mg/dL   Urinalysis, Reflex to Urine Culture Urine, Clean Catch    Collection Time: 03/29/19 10:40 PM   Result Value Ref Range    Specimen UA Urine, Clean Catch     Color, UA Yellow Yellow, Straw, Sayra    Appearance, UA Clear Clear    pH, UA 5.0 5.0 - 8.0    Specific Gravity, UA 1.025 1.005 - 1.030    Protein, UA 1+ (A) Negative    Glucose, UA 3+ (A) Negative    Ketones, UA Trace (A) Negative    Bilirubin (UA) Negative Negative    Occult Blood UA 1+ (A) Negative    Nitrite, UA Negative Negative    Urobilinogen, UA Negative <2.0 EU/dL    Leukocytes, UA Negative Negative   Urinalysis Microscopic    Collection Time: 03/29/19 10:40 PM   Result Value Ref Range    RBC, UA 3 0 - 4  /hpf    WBC, UA 12 (H) 0 - 5 /hpf    WBC Clumps, UA Occasional (A) None-Rare    Bacteria, UA Many (A) None-Occ /hpf    Yeast, UA None None    Squam Epithel, UA 4 /hpf    Hyaline Casts, UA none 0-1/lpf /lpf    Microscopic Comment SEE COMMENT    POCT glucose    Collection Time: 03/30/19  2:19 AM   Result Value Ref Range    POCT Glucose 455 (HH) 70 - 110 mg/dL       Lab Results   Component Value Date    INR 1.0 03/29/2019    INR 1.0 10/03/2014     Lab Results   Component Value Date    HGBA1C 9.7 (H) 03/28/2019     Recent Labs     03/29/19  2213 03/29/19  2216 03/30/19  0219   POCTGLUCOSE 491* 493* 455*           MICROBIOLOGY DATA:     Urine Culture, Routine   Date Value Ref Range Status   07/29/2018 ESCHERICHIA COLI  > 100,000 cfu/ml    Final   08/10/2016 PRESUMPTIVE E COLI  >100,000 cfu/ml    Final   02/05/2015 ESCHERICHIA COLI  > 100,000 cfu/ml    Final   02/05/2015 ENTEROBACTER CLOACAE  10,000 - 49,999 cfu/ml    Final   08/11/2014   Final    Multiple organisms isolated. None in predominance.  Repeat if   08/11/2014 clinically necessary.  Final       Microbiology x 7d:   Microbiology Results (last 7 days)     Procedure Component Value Units Date/Time    Urine culture [[id number]] Collected:  03/29/19 2240    Order Status:  No result Specimen:  Urine Updated:  03/29/19 2892            IMAGING:     Imaging Results          X-Ray Chest AP Portable (Final result)  Result time 03/29/19 15:27:43    Final result by Rylan Roque MD (03/29/19 15:27:43)                 Impression:      No acute intrathoracic process.      Electronically signed by: Rylan Roque MD  Date:    03/29/2019  Time:    15:27             Narrative:    EXAMINATION:  XR CHEST AP PORTABLE    CLINICAL HISTORY:  chest pain;    TECHNIQUE:  Single frontal view of the chest was performed.    COMPARISON:  Chest radiograph from 08/03/2015    FINDINGS:  Mild cardiomegaly.  Normal pulmonary vasculature.  Atherosclerosis of the aortic arch.  Lungs are  "clear.  No pneumothorax.  No pleural effusion.  Osseous structures demonstrate degenerative changes of the spine.                                  CONSULTS:     IP CONSULT TO HOSPITALIST       ASSESSMENT & PLAN:     Primary Diagnosis:  Acute inferior myocardial infarction    Active Hospital Problems    Diagnosis  POA    *Acute inferior myocardial infarction [I21.19]  Yes     Priority: 1 - High     Chronic    Coronary artery disease involving native coronary artery of native heart [I25.10]  Yes     Priority: 2      March 29, 2019:  [facility] and PCI of RCA -  "Patient has serial mid 99% eccentric lesions consistent with plaque rupture site and abnormal EKG findings.... We initially pre-dilated with 3.0 balloon.  We placed a 3.0 by 12 mm resolute kenya stent in the midportion of the vessel.  We overlapped that with a 3.5 x 15 mm resolute kenya stent in the more proximal portion mid RCA.  Good result was achieved with MADINA 3 flow through the vessel.  Lad has a mid 90% stenosis and the left circumflex as well as the long 95% lesion as well.     "      Uncontrolled type 2 diabetes mellitus with diabetic polyneuropathy, with long-term current use of insulin [E11.42, Z79.4, E11.65]  Not Applicable     Chronic    Hyperlipidemia associated with type 2 diabetes mellitus [E11.69, E78.5]  Yes     Chronic    Hypertension associated with diabetes [E11.59, I10]  Yes     Chronic      Resolved Hospital Problems   No resolved problems to display.         Acute inferior myocardial infarction (per Cardiology)  Emergent cath lab activation  Emergency consent was obtained for the procedure  Will adjust therapy based off testing  Patient was premedicated with Benadryl and famotidine for possible iodine allergy    Hypertension  · Goal while inpatient is a systolic blood pressure less than 160mmHg  · BP in acceptable range at this time  · Continue current home regimen with hold parameters  · PRN antihypertensives available                      "                                                                                                                                                                                                                                                                                                                                                                                                                                                                                                                                              Hyperlipidemia associated with type 2 diabetes mellitus  · Lipid panel -   · Cardiac diet  · Continue statin        VTE Risk Mitigation (From admission, onward)    None            Adult PRN medications available   DVT prophylaxis given       DISPOSITION:      Hospital Medicine service will continue to follow for further evaluation and treatment.    Chart reviewed and updated where applicable.    High Risk Conditions:  Patient has a condition that poses threat to life and bodily function:  ST-elevation myocardial infarction      ===============================================================    Greg Morales MD, MPH  Department of Hospital Medicine   [facility] [place]  [phone] pg  (7pm - 6am)          This note is dictated using Digital Signal voice recognition software.  There are word recognition mistakes that are occasionally missed on review.

## 2019-03-31 VITALS
BODY MASS INDEX: 29.68 KG/M2 | TEMPERATURE: 98 F | HEIGHT: 69 IN | WEIGHT: 200.38 LBS | OXYGEN SATURATION: 98 % | HEART RATE: 88 BPM | RESPIRATION RATE: 17 BRPM | DIASTOLIC BLOOD PRESSURE: 66 MMHG | SYSTOLIC BLOOD PRESSURE: 159 MMHG

## 2019-03-31 PROBLEM — E83.42 HYPOMAGNESEMIA: Status: ACTIVE | Noted: 2019-03-31

## 2019-03-31 LAB
ANION GAP SERPL CALC-SCNC: 12 MMOL/L (ref 8–16)
BACTERIA UR CULT: NORMAL
BUN SERPL-MCNC: 31 MG/DL (ref 8–23)
CALCIUM SERPL-MCNC: 10.3 MG/DL (ref 8.7–10.5)
CHLORIDE SERPL-SCNC: 98 MMOL/L (ref 95–110)
CO2 SERPL-SCNC: 27 MMOL/L (ref 23–29)
CREAT SERPL-MCNC: 1.3 MG/DL (ref 0.5–1.4)
EST. GFR  (AFRICAN AMERICAN): 49 ML/MIN/1.73 M^2
EST. GFR  (NON AFRICAN AMERICAN): 42 ML/MIN/1.73 M^2
GLUCOSE SERPL-MCNC: 268 MG/DL (ref 70–110)
MAGNESIUM SERPL-MCNC: 1.5 MG/DL (ref 1.6–2.6)
PHOSPHATE SERPL-MCNC: 3.3 MG/DL (ref 2.7–4.5)
POCT GLUCOSE: 306 MG/DL (ref 70–110)
POCT GLUCOSE: 308 MG/DL (ref 70–110)
POTASSIUM SERPL-SCNC: 4.2 MMOL/L (ref 3.5–5.1)
SODIUM SERPL-SCNC: 137 MMOL/L (ref 136–145)

## 2019-03-31 PROCEDURE — 25000003 PHARM REV CODE 250: Performed by: INTERNAL MEDICINE

## 2019-03-31 PROCEDURE — 99239 HOSP IP/OBS DSCHRG MGMT >30: CPT | Mod: GC,,, | Performed by: INTERNAL MEDICINE

## 2019-03-31 PROCEDURE — 36415 COLL VENOUS BLD VENIPUNCTURE: CPT

## 2019-03-31 PROCEDURE — 84100 ASSAY OF PHOSPHORUS: CPT

## 2019-03-31 PROCEDURE — 63600175 PHARM REV CODE 636 W HCPCS: Performed by: HOSPITALIST

## 2019-03-31 PROCEDURE — 83735 ASSAY OF MAGNESIUM: CPT

## 2019-03-31 PROCEDURE — 80048 BASIC METABOLIC PNL TOTAL CA: CPT

## 2019-03-31 PROCEDURE — 25000003 PHARM REV CODE 250: Performed by: HOSPITALIST

## 2019-03-31 PROCEDURE — 99239 PR HOSPITAL DISCHARGE DAY,>30 MIN: ICD-10-PCS | Mod: GC,,, | Performed by: INTERNAL MEDICINE

## 2019-03-31 RX ORDER — NITROGLYCERIN 0.4 MG/1
0.4 TABLET SUBLINGUAL EVERY 5 MIN PRN
Qty: 50 TABLET | Refills: 0 | Status: SHIPPED | OUTPATIENT
Start: 2019-03-31 | End: 2020-06-29 | Stop reason: SDUPTHER

## 2019-03-31 RX ORDER — LANOLIN ALCOHOL/MO/W.PET/CERES
400 CREAM (GRAM) TOPICAL 2 TIMES DAILY
Status: DISCONTINUED | OUTPATIENT
Start: 2019-03-31 | End: 2019-03-31 | Stop reason: HOSPADM

## 2019-03-31 RX ORDER — AMLODIPINE BESYLATE 5 MG/1
10 TABLET ORAL DAILY
Status: DISCONTINUED | OUTPATIENT
Start: 2019-04-01 | End: 2019-03-31 | Stop reason: HOSPADM

## 2019-03-31 RX ORDER — MAGNESIUM SULFATE HEPTAHYDRATE 40 MG/ML
2 INJECTION, SOLUTION INTRAVENOUS ONCE
Status: COMPLETED | OUTPATIENT
Start: 2019-03-31 | End: 2019-03-31

## 2019-03-31 RX ORDER — MAGNESIUM SULFATE HEPTAHYDRATE 40 MG/ML
2 INJECTION, SOLUTION INTRAVENOUS ONCE
Status: DISCONTINUED | OUTPATIENT
Start: 2019-03-31 | End: 2019-03-31

## 2019-03-31 RX ORDER — PANTOPRAZOLE SODIUM 40 MG/1
40 TABLET, DELAYED RELEASE ORAL DAILY
Qty: 30 TABLET | Refills: 11 | Status: SHIPPED | OUTPATIENT
Start: 2019-04-01 | End: 2020-03-25 | Stop reason: SDUPTHER

## 2019-03-31 RX ORDER — METOPROLOL SUCCINATE 25 MG/1
25 TABLET, EXTENDED RELEASE ORAL DAILY
Qty: 30 TABLET | Refills: 11 | Status: SHIPPED | OUTPATIENT
Start: 2019-04-01 | End: 2019-07-03 | Stop reason: SDUPTHER

## 2019-03-31 RX ORDER — ATORVASTATIN CALCIUM 80 MG/1
80 TABLET, FILM COATED ORAL NIGHTLY
Qty: 90 TABLET | Refills: 3 | Status: SHIPPED | OUTPATIENT
Start: 2019-03-31 | End: 2020-03-25 | Stop reason: SDUPTHER

## 2019-03-31 RX ORDER — INSULIN ASPART 100 [IU]/ML
40 INJECTION, SUSPENSION SUBCUTANEOUS 2 TIMES DAILY
Qty: 1 BOX | Refills: 0 | Status: ON HOLD
Start: 2019-03-31 | End: 2019-04-10 | Stop reason: HOSPADM

## 2019-03-31 RX ADMIN — AMLODIPINE BESYLATE 5 MG: 5 TABLET ORAL at 08:03

## 2019-03-31 RX ADMIN — INSULIN ASPART 40 UNITS: 100 INJECTION, SUSPENSION SUBCUTANEOUS at 08:03

## 2019-03-31 RX ADMIN — MAGNESIUM SULFATE IN WATER 2 G: 40 INJECTION, SOLUTION INTRAVENOUS at 09:03

## 2019-03-31 RX ADMIN — TICAGRELOR 90 MG: 90 TABLET ORAL at 08:03

## 2019-03-31 RX ADMIN — FLUOXETINE 20 MG: 20 CAPSULE ORAL at 08:03

## 2019-03-31 RX ADMIN — PANTOPRAZOLE SODIUM 40 MG: 40 TABLET, DELAYED RELEASE ORAL at 08:03

## 2019-03-31 RX ADMIN — METOPROLOL SUCCINATE 25 MG: 25 TABLET, EXTENDED RELEASE ORAL at 08:03

## 2019-03-31 RX ADMIN — MAGNESIUM OXIDE TAB 400 MG (241.3 MG ELEMENTAL MG) 400 MG: 400 (241.3 MG) TAB at 10:03

## 2019-03-31 NOTE — ASSESSMENT & PLAN NOTE
Per primary service   Patient is chest pain-free and stable for discharge when okay with primary

## 2019-03-31 NOTE — PROGRESS NOTES
"[facility] Hospital Medicine  Progress Note    Patient Name: Lorena Conrteras  MRN: [id number]  Patient Class: IP- Inpatient   Admission Date: 3/29/2019  Length of Stay: 2 days  Attending Physician: Bladimir Barbosa MD  Primary Care Provider: Kenneth Luu MD        Subjective:     Principal Problem:Acute inferior myocardial infarction    HPI:  Lorena Contreras is a 68 y.o. female that (in part)  has a past medical history of Anxiety, Diabetes mellitus, type II, Follicular lymphoma, HTN (hypertension), and Restless leg syndrome.  has a past surgical history that includes Knee surgery; Elbow surgery; Colonoscopy (11/07/2012); and Esophagogastroduodenoscopy (11/07/2012). Presents to [facility] Emergency Department complaining of chest pain. Workup revealed evidence of an acute ST-elevation MI.  She was taking for emergent left heart catheterization with femoral access where she underwent PCI and stent placement of the right coronary artery on March 29, 2019:    "Patient has serial mid 99% eccentric lesions consistent with plaque rupture site and abnormal EKG findings.... We initially pre-dilated with 3.0 balloon.  We placed a 3.0 by 12 mm resolute kenya stent in the midportion of the vessel.  We overlapped that with a 3.5 x 15 mm resolute kenya stent in the more proximal portion mid RCA.  Good result was achieved with MADINA 3 flow through the vessel.  Lad has a mid 90% stenosis and the left circumflex as well as the long 95% lesion as well."     Patient resting well in the ICU and looking forward to being able to stand up after 4:00 a.m..  She reports some mild back pain from lying flat.  She normally lies on her side.  Otherwise no complaints.  Denies chest pain, palpitations, diaphoresis, or chills.  No significant pain at her the femoral access site.  Hospital medicine has been asked to see the patient for consultation.       Hospital Course:  Hospital medicine following " for diabetes management. Pt received steroid prep for Angiogram and blood sugars in 400 range. Given 15 units levemir x one dose. Will resume home 70/30 insulin 40u BID and januvia 100mg daily. Increase SSI to moderate dose. Ok to resume metformin on dc home.    Interval History:  Patient had difficulty sleeping at night, but no chest pain and wants to go home.    Review of Systems   Constitutional: Negative for chills and fever.   Respiratory: Negative for shortness of breath.    Cardiovascular: Negative for chest pain.     Objective:     Vital Signs (Most Recent):  Temp: 97.8 °F (36.6 °C) (03/31/19 0729)  Pulse: 88 (03/31/19 0729)  Resp: 17 (03/31/19 0729)  BP: (!) 151/67 (03/31/19 0729)  SpO2: 98 % (03/31/19 0729) Vital Signs (24h Range):  Temp:  [97.4 °F (36.3 °C)-98.2 °F (36.8 °C)] 97.8 °F (36.6 °C)  Pulse:  [] 88  Resp:  [14-38] 17  SpO2:  [96 %-100 %] 98 %  BP: (134-175)/(60-83) 151/67     Weight: 90.9 kg (200 lb 6.4 oz)  Body mass index is 29.59 kg/m².    Intake/Output Summary (Last 24 hours) at 3/31/2019 1003  Last data filed at 3/30/2019 1700  Gross per 24 hour   Intake 840 ml   Output 1800 ml   Net -960 ml      Physical Exam   Constitutional: She is oriented to person, place, and time. She appears well-developed and well-nourished. No distress.   HENT:   Head: Normocephalic and atraumatic.   Mouth/Throat: Oropharynx is clear and moist.   Eyes: Pupils are equal, round, and reactive to light. EOM are normal.   Neck: Neck supple. No JVD present.   Cardiovascular: Normal rate, regular rhythm, normal heart sounds and intact distal pulses.   Pulmonary/Chest: Effort normal and breath sounds normal. No respiratory distress.   Abdominal: Soft. Bowel sounds are normal. She exhibits no distension.   Musculoskeletal: Normal range of motion. She exhibits no edema.   Neurological: She is alert and oriented to person, place, and time.   Skin: Capillary refill takes less than 2 seconds.   Psychiatric: She has a  normal mood and affect. Her behavior is normal.       Significant Labs:   POCT Glucose:   Recent Labs   Lab 03/30/19  0634 03/30/19 2007 03/31/19  0728   POCTGLUCOSE 410* 334* 306*       Significant Imaging: I have reviewed all pertinent imaging results/findings within the past 24 hours.    Assessment/Plan:      * Acute inferior myocardial infarction  Per primary service       status post PCI of the RCA  Patient is chest pain-free and stable for discharge when okay with primary    Hypomagnesemia  Declined IV magnesium sulfate  Will treat with magnesium oxide    Coronary artery disease involving native coronary artery of native heart  Per primary service     Uncontrolled type 2 diabetes mellitus with diabetic polyneuropathy, with long-term current use of insulin  Resume 70/30 home insulin + januvia  Ok to restart metformin tomorrow on dc home  SSI and diabetic diet  Anticipate blood sugars will remain elevated x 2-3 days with steroid prep but is resolving as expected  Hemoglobin A1C   Date Value Ref Range Status   03/28/2019 9.7 (H) 4.0 - 5.6 % Final   11/26/2018 8.9 (H) 4.0 - 5.6 % Final   11/26/2018 8.9 (H) 4.0 - 5.6 % Final       Hyperlipidemia associated with type 2 diabetes mellitus  Resume statin     Hypertension associated with diabetes  Per primary        VTE Risk Mitigation (From admission, onward)    None              Terri Barnhart MD  Department of Hospital Medicine   [facility]

## 2019-03-31 NOTE — SUBJECTIVE & OBJECTIVE
Interval History:  Patient had difficulty sleeping at night, but no chest pain and wants to go home.    Review of Systems   Constitutional: Negative for chills and fever.   Respiratory: Negative for shortness of breath.    Cardiovascular: Negative for chest pain.     Objective:     Vital Signs (Most Recent):  Temp: 97.8 °F (36.6 °C) (03/31/19 0729)  Pulse: 88 (03/31/19 0729)  Resp: 17 (03/31/19 0729)  BP: (!) 151/67 (03/31/19 0729)  SpO2: 98 % (03/31/19 0729) Vital Signs (24h Range):  Temp:  [97.4 °F (36.3 °C)-98.2 °F (36.8 °C)] 97.8 °F (36.6 °C)  Pulse:  [] 88  Resp:  [14-38] 17  SpO2:  [96 %-100 %] 98 %  BP: (134-175)/(60-83) 151/67     Weight: 90.9 kg (200 lb 6.4 oz)  Body mass index is 29.59 kg/m².    Intake/Output Summary (Last 24 hours) at 3/31/2019 1003  Last data filed at 3/30/2019 1700  Gross per 24 hour   Intake 840 ml   Output 1800 ml   Net -960 ml      Physical Exam   Constitutional: She is oriented to person, place, and time. She appears well-developed and well-nourished. No distress.   HENT:   Head: Normocephalic and atraumatic.   Mouth/Throat: Oropharynx is clear and moist.   Eyes: Pupils are equal, round, and reactive to light. EOM are normal.   Neck: Neck supple. No JVD present.   Cardiovascular: Normal rate, regular rhythm, normal heart sounds and intact distal pulses.   Pulmonary/Chest: Effort normal and breath sounds normal. No respiratory distress.   Abdominal: Soft. Bowel sounds are normal. She exhibits no distension.   Musculoskeletal: Normal range of motion. She exhibits no edema.   Neurological: She is alert and oriented to person, place, and time.   Skin: Capillary refill takes less than 2 seconds.   Psychiatric: She has a normal mood and affect. Her behavior is normal.       Significant Labs:   POCT Glucose:   Recent Labs   Lab 03/30/19 0634 03/30/19 2007 03/31/19 0728   POCTGLUCOSE 410* 334* 306*       Significant Imaging: I have reviewed all pertinent imaging results/findings  within the past 24 hours.

## 2019-03-31 NOTE — PLAN OF CARE
03/31/19 1618   Post-Acute Status   Post-Acute Authorization   (Home with cardiology follow-up)

## 2019-03-31 NOTE — NURSING
Discharge Preparation:    Note that patient awake, alert and fully oriented awaiting discharge;     Case Management coordination complete;    IV and Telemetry monitor removed; No bleeding observed; patient denies pain;     Reviewed discharge plan with family and they are in agreement with same;     Will contact hospital transport for w/c to parking garage;     Will continue to f/u;

## 2019-03-31 NOTE — PLAN OF CARE
Problem: Diabetes Comorbidity  Goal: Blood Glucose Level Within Desired Range    Intervention: Maintain Glycemic Control     03/31/19 1414   Monitor and Manage Ketoacidosis   Glycemic Management blood glucose monitoring;oral hydration promoted         Problem: Adult Inpatient Plan of Care  Goal: Plan of Care Review  Outcome: Ongoing (interventions implemented as appropriate)     03/31/19 1414   Plan of Care Review   Plan of Care Reviewed With patient   Progress improving

## 2019-03-31 NOTE — DISCHARGE SUMMARY
[facility] Hospital Medicine  Discharge Summary      Patient Name: Lorena Contreras  MRN: [id number]  Admission Date: 3/29/2019  Hospital Length of Stay: 2 days  Discharge Date and Time:  03/31/2019 2:05 PM  Attending Physician: Bladimir Barbosa MD   Discharging Provider: Bladimir Barbosa MD  Primary Care Provider: Kenneth Luu MD        HPI:  Patient is a 68-year-old female with history of hypertension, dyslipidemia and type 2 diabetes who presented with acute inferior ST-elevation MI.  Please see admission H&P for full details.    Procedure(s) (LRB):  Left heart cath (Left)  Percutaneous coronary intervention (N/A)      Hospital Course:  Emergently admitted and taken to the cath lab for acute inferior ST-elevation MI.  She underwent PCI to the mid RCA.  She was found to have multivessel coronary artery disease including long lesions through the LAD as well as the left circumflex for which she is currently being medically managed.  Consideration is to revascularize as an outpatient for any continued symptoms or consider NST for risk stratification post MI.  She denies any current symptoms and had no issues post revascularization.  She was observed overnight in the intensive care unit on Aggrastat infusion.  She had significant reaction and although initially skeptical we do believe a number of her allergies are significant including aspirin.  She will possibly be referred to Allergy for desensitization.  There are adequate studies showing efficacy of Brilinta the monotherapy in these cases.  We will continue this for now.  Otherwise she was continued on beta-blocker and required amlodipine for blood pressure control.  Due to marginal kidney function and dye load Ace/ARB was not started.  We will consider this as an outpatient.  Otherwise we emphasized compliance with medicines.  She needs tighter glycemic control and can follow up with Endocrine as an outpatient.    Consults:   Consults (From  admission, onward)        Status Ordering Provider     Inpatient consult to Hospitalist  Once     Provider:  (Not yet assigned)    Acknowledged KAMI BARBOSA          Final Active Diagnoses:    Diagnosis Date Noted POA    PRINCIPAL PROBLEM:  Acute inferior myocardial infarction [I21.19] 03/29/2019 Yes     Chronic    Coronary artery disease involving native coronary artery of native heart [I25.10] 03/29/2019 Yes    Hypertension associated with diabetes [E11.59, I10] 01/24/2014 Yes     Chronic    Hyperlipidemia associated with type 2 diabetes mellitus [E11.69, E78.5] 07/30/2015 Yes     Chronic    Uncontrolled type 2 diabetes mellitus with diabetic polyneuropathy, with long-term current use of insulin [E11.42, Z79.4, E11.65] 11/08/2016 Not Applicable     Chronic    Hypomagnesemia [E83.42] 03/31/2019 Yes      Problems Resolved During this Admission:      Discharged Condition: good    Disposition:  home    Follow Up:  Follow-up Information     Kami Barbosa MD In 1 week.    Specialties:  INTERVENTIONAL CARDIOLOGY, Cardiology  Contact information:  [address] [place], [place] [id number] [phone]                 Patient Instructions:   Diet cardiac  Activity as tolerated  Routine post PCI instructions given    Medications:  Reconciled Home Medications:      Medication List      START taking these medications    atorvastatin 80 MG tablet  Commonly known as:  LIPITOR  Take 1 tablet (80 mg total) by mouth every evening.     metoprolol succinate 25 MG 24 hr tablet  Commonly known as:  TOPROL-XL  Take 1 tablet (25 mg total) by mouth once daily.  Start taking on:  4/1/2019     nitroGLYCERIN 0.4 MG SL tablet  Commonly known as:  NITROSTAT  Place 1 tablet (0.4 mg total) under the tongue every 5 (five) minutes as needed for Chest pain.     pantoprazole 40 MG tablet  Commonly known as:  PROTONIX  Take 1 tablet (40 mg total) by mouth once daily.  Start taking on:  4/1/2019     ticagrelor 90 mg  "tablet  Commonly known as:  BRILINTA  Take 1 tablet (90 mg total) by mouth 2 (two) times daily.        CHANGE how you take these medications    * insulin aspart protamine-insulin aspart 100 unit/mL (70-30) Inpn pen  Commonly known as:  NovoLOG Mix 70-30FlexPen U-100  Inject 30 Units into the skin 2 (two) times daily before meals.  What changed:  Another medication with the same name was added. Make sure you understand how and when to take each.     * insulin aspart protamine-insulin aspart 100 unit/mL (70-30) Inpn pen  Commonly known as:  NovoLOG 70/30  Inject 40 Units into the skin 2 (two) times daily.  What changed:  You were already taking a medication with the same name, and this prescription was added. Make sure you understand how and when to take each.     tolnaftate 1 % cream  Commonly known as:  TINACTIN  Apply topically 2 (two) times daily.  What changed:    · when to take this  · reasons to take this         * This list has 2 medication(s) that are the same as other medications prescribed for you. Read the directions carefully, and ask your doctor or other care provider to review them with you.            CONTINUE taking these medications    amLODIPine 10 MG tablet  Commonly known as:  NORVASC  TAKE ONE TABLET BY MOUTH ONCE DAILY     bismuth tribrom-petrolatum,wh 5 X 9 " Bndg  Commonly known as:  XEROFORM  Apply dressing to wound daily     DULoxetine 60 MG capsule  Commonly known as:  CYMBALTA  Take 1 capsule (60 mg total) by mouth once daily.     esomeprazole 20 MG capsule  Commonly known as:  NEXIUM  Take 20 mg by mouth before breakfast.     FLUoxetine 20 MG capsule  Take 1 capsule (20 mg total) by mouth once daily.     FREESTYLE TEST Strp  Generic drug:  blood sugar diagnostic  1 strip by Misc.(Non-Drug; Combo Route) route 4 (four) times daily.     lancets 32 gauge Misc  1 lancet by Misc.(Non-Drug; Combo Route) route 4 (four) times daily.     magnesium oxide 400 mg (241.3 mg magnesium) tablet  Commonly " "known as:  MAG-OX  Take 400 mg by mouth once daily.     meclizine 25 mg tablet  Commonly known as:  ANTIVERT  Take 1 tablet (25 mg total) by mouth 3 (three) times daily as needed.     metFORMIN 1000 MG tablet  Commonly known as:  GLUCOPHAGE  TAKE ONE TABLET BY MOUTH TWICE DAILY WITH MEALS     pen needle, diabetic 32 gauge x 5/32" Ndle  Use with Novolog Mix Flexpens     SITagliptin 100 MG Tab  Commonly known as:  JANUVIA  Take 1 tablet (100 mg total) by mouth once daily.     triamcinolone acetonide 0.1% 0.1 % cream  Commonly known as:  KENALOG  APPLY  CREAM EXTERNALLY TO AFFECTED AREA TWICE DAILY AS NEEDED     VITAMIN D3 2,000 unit Cap  Generic drug:  cholecalciferol (vitamin D3)  Take 2 capsules by mouth 2 (two) times daily.        STOP taking these medications    atenolol 50 MG tablet  Commonly known as:  TENORMIN        ASK your doctor about these medications    furosemide 20 MG tablet  Commonly known as:  LASIX  Take 1 tablet (20 mg total) by mouth daily as needed (leg swelling).     hydroCHLOROthiazide 25 MG tablet  Commonly known as:  HYDRODIURIL  TAKE 1 TABLET BY MOUTH ONCE DAILY            Significant Diagnostic Studies: Labs:   BMP:   Recent Labs   Lab 03/29/19  1449 03/30/19  0414 03/31/19  0443   * 427* 268*   * 133* 137   K 4.3 5.2* 4.2   CL 98 100 98   CO2 23 21* 27   BUN 27* 29* 31*   CREATININE 1.3 1.3 1.3   CALCIUM 10.4 9.3 10.3   MG 1.2* 1.3* 1.5*       Pending Diagnostic Studies:     Procedure Component Value Units Date/Time    EKG 12-LEAD starting tomorrow [[id number]] Collected:  03/30/19 1457    Order Status:  Sent Lab Status:  In process Updated:  03/30/19 1923    Narrative:       Test Reason :     Vent. Rate : 108 BPM     Atrial Rate : 108 BPM     P-R Int : 128 ms          QRS Dur : 076 ms      QT Int : 352 ms       P-R-T Axes : 042 -03 -50 degrees     QTc Int : 471 ms    Sinus tachycardia  LVH with repolarization abnormality  Inferior infarct (cited on or before " 29-MAR-2019)  Possible Anterolateral infarct ,age undetermined  Abnormal ECG  When compared with ECG of 29-MAR-2019 16:01,  Significant changes have occurred    Referred By: [email or web address]   SELF           Confirmed By:         Indwelling Lines/Drains at time of discharge:   Lines/Drains/Airways          None          Time spent on the discharge of patient: 35 minutes  Patient was seen and examined on the date of discharge and determined to be suitable for discharge.         Bladimir Barbosa MD  Department of Hospital Medicine  [facility]

## 2019-03-31 NOTE — ASSESSMENT & PLAN NOTE
Resume 70/30 home insulin + januvia  Ok to restart metformin tomorrow on dc home  SSI and diabetic diet  Anticipate blood sugars will remain elevated x 2-3 days with steroid prep but is resolving as expected  Hemoglobin A1C   Date Value Ref Range Status   03/28/2019 9.7 (H) 4.0 - 5.6 % Final   11/26/2018 8.9 (H) 4.0 - 5.6 % Final   11/26/2018 8.9 (H) 4.0 - 5.6 % Final

## 2019-03-31 NOTE — PLAN OF CARE
03/31/19 1617   Final Note   Assessment Type Final Discharge Note   Anticipated Discharge Disposition Home   What phone number can be called within the next 1-3 days to see how you are doing after discharge?   ([phone] )   Hospital Follow Up  Appt(s) scheduled? Yes  (Cardiology follow-up)   Discharge plans and expectations educations in teach back method with documentation complete? Yes   Right Care Referral Info   Post Acute Recommendation No Care

## 2019-03-31 NOTE — PROGRESS NOTES
Patient will discharge with cardiology follow-up with Dr. Barbosa; the earliest available appointment was on 4/30/19 at 8:40am.  The patient was placed on the waiting list, and Dr. Barbosa's office will contact the patient for an earlier appointment if possible. Patient is ready for discharge from CM perspective.

## 2019-04-01 LAB
POCT GLUCOSE: >500 MG/DL (ref 70–110)
POCT GLUCOSE: >500 MG/DL (ref 70–110)

## 2019-04-09 ENCOUNTER — HOSPITAL ENCOUNTER (OUTPATIENT)
Facility: HOSPITAL | Age: 69
Discharge: HOME | End: 2019-04-10
Attending: EMERGENCY MEDICINE | Admitting: EMERGENCY MEDICINE
Payer: MEDICARE

## 2019-04-09 DIAGNOSIS — F41.9 ANXIETY: Chronic | ICD-10-CM

## 2019-04-09 DIAGNOSIS — R00.2 PALPITATIONS: Chronic | ICD-10-CM

## 2019-04-09 DIAGNOSIS — I25.10 CAD (CORONARY ARTERY DISEASE): ICD-10-CM

## 2019-04-09 DIAGNOSIS — I15.2 HYPERTENSION ASSOCIATED WITH DIABETES: Chronic | ICD-10-CM

## 2019-04-09 DIAGNOSIS — R07.9 CHEST PAIN: Primary | ICD-10-CM

## 2019-04-09 DIAGNOSIS — E11.69 HYPERLIPIDEMIA ASSOCIATED WITH TYPE 2 DIABETES MELLITUS: Chronic | ICD-10-CM

## 2019-04-09 DIAGNOSIS — E78.5 HYPERLIPIDEMIA ASSOCIATED WITH TYPE 2 DIABETES MELLITUS: Chronic | ICD-10-CM

## 2019-04-09 DIAGNOSIS — E11.59 HYPERTENSION ASSOCIATED WITH DIABETES: Chronic | ICD-10-CM

## 2019-04-09 LAB
ALBUMIN SERPL BCP-MCNC: 3.5 G/DL (ref 3.5–5.2)
ALP SERPL-CCNC: 141 U/L (ref 55–135)
ALT SERPL W/O P-5'-P-CCNC: 27 U/L (ref 10–44)
ANION GAP SERPL CALC-SCNC: 10 MMOL/L (ref 8–16)
APTT BLDCRRT: 31.1 SEC (ref 21–32)
AST SERPL-CCNC: 32 U/L (ref 10–40)
BACTERIA #/AREA URNS HPF: NORMAL /HPF
BASOPHILS # BLD AUTO: 0.05 K/UL (ref 0–0.2)
BASOPHILS NFR BLD: 0.5 % (ref 0–1.9)
BILIRUB SERPL-MCNC: 0.3 MG/DL (ref 0.1–1)
BILIRUB UR QL STRIP: NEGATIVE
BUN SERPL-MCNC: 22 MG/DL (ref 8–23)
CALCIUM SERPL-MCNC: 10.6 MG/DL (ref 8.7–10.5)
CHLORIDE SERPL-SCNC: 103 MMOL/L (ref 95–110)
CLARITY UR: CLEAR
CO2 SERPL-SCNC: 25 MMOL/L (ref 23–29)
COLOR UR: YELLOW
CREAT SERPL-MCNC: 1.1 MG/DL (ref 0.5–1.4)
D DIMER PPP IA.FEU-MCNC: 0.71 MG/L FEU
DIFFERENTIAL METHOD: ABNORMAL
EOSINOPHIL # BLD AUTO: 0.2 K/UL (ref 0–0.5)
EOSINOPHIL NFR BLD: 1.6 % (ref 0–8)
ERYTHROCYTE [DISTWIDTH] IN BLOOD BY AUTOMATED COUNT: 13.9 % (ref 11.5–14.5)
EST. GFR  (AFRICAN AMERICAN): 60 ML/MIN/1.73 M^2
EST. GFR  (NON AFRICAN AMERICAN): 52 ML/MIN/1.73 M^2
GLUCOSE SERPL-MCNC: 165 MG/DL (ref 70–110)
GLUCOSE UR QL STRIP: NEGATIVE
HCT VFR BLD AUTO: 35.1 % (ref 37–48.5)
HGB BLD-MCNC: 11.3 G/DL (ref 12–16)
HGB UR QL STRIP: NEGATIVE
HYALINE CASTS #/AREA URNS LPF: 0 /LPF
INR PPP: 1 (ref 0.8–1.2)
KETONES UR QL STRIP: NEGATIVE
LEUKOCYTE ESTERASE UR QL STRIP: NEGATIVE
LYMPHOCYTES # BLD AUTO: 2.1 K/UL (ref 1–4.8)
LYMPHOCYTES NFR BLD: 20.2 % (ref 18–48)
MAGNESIUM SERPL-MCNC: 1.4 MG/DL (ref 1.6–2.6)
MCH RBC QN AUTO: 27.7 PG (ref 27–31)
MCHC RBC AUTO-ENTMCNC: 32.2 G/DL (ref 32–36)
MCV RBC AUTO: 86 FL (ref 82–98)
MICROSCOPIC COMMENT: NORMAL
MONOCYTES # BLD AUTO: 0.6 K/UL (ref 0.3–1)
MONOCYTES NFR BLD: 5.7 % (ref 4–15)
NEUTROPHILS # BLD AUTO: 7.3 K/UL (ref 1.8–7.7)
NEUTROPHILS NFR BLD: 72 % (ref 38–73)
NITRITE UR QL STRIP: NEGATIVE
PH UR STRIP: 6 [PH] (ref 5–8)
PHOSPHATE SERPL-MCNC: 2.9 MG/DL (ref 2.7–4.5)
PLATELET # BLD AUTO: 295 K/UL (ref 150–350)
PMV BLD AUTO: 12.4 FL (ref 9.2–12.9)
POCT GLUCOSE: 149 MG/DL (ref 70–110)
POCT GLUCOSE: 233 MG/DL (ref 70–110)
POTASSIUM SERPL-SCNC: 3.8 MMOL/L (ref 3.5–5.1)
PROT SERPL-MCNC: 7.6 G/DL (ref 6–8.4)
PROT UR QL STRIP: ABNORMAL
PROTHROMBIN TIME: 10.1 SEC (ref 9–12.5)
RBC # BLD AUTO: 4.08 M/UL (ref 4–5.4)
RBC #/AREA URNS HPF: 1 /HPF (ref 0–4)
SODIUM SERPL-SCNC: 138 MMOL/L (ref 136–145)
SP GR UR STRIP: 1.01 (ref 1–1.03)
SQUAMOUS #/AREA URNS HPF: 3 /HPF
T4 FREE SERPL-MCNC: 1.3 NG/DL (ref 0.71–1.51)
TROPONIN I SERPL DL<=0.01 NG/ML-MCNC: 0.04 NG/ML (ref 0–0.03)
TROPONIN I SERPL DL<=0.01 NG/ML-MCNC: 0.04 NG/ML (ref 0–0.03)
TROPONIN I SERPL DL<=0.01 NG/ML-MCNC: 0.05 NG/ML (ref 0–0.03)
TSH SERPL DL<=0.005 MIU/L-ACNC: 0.37 UIU/ML (ref 0.4–4)
URN SPEC COLLECT METH UR: ABNORMAL
UROBILINOGEN UR STRIP-ACNC: NEGATIVE EU/DL
WBC # BLD AUTO: 10.13 K/UL (ref 3.9–12.7)
WBC #/AREA URNS HPF: 1 /HPF (ref 0–5)

## 2019-04-09 PROCEDURE — G0378 HOSPITAL OBSERVATION PER HR: HCPCS

## 2019-04-09 PROCEDURE — 81000 URINALYSIS NONAUTO W/SCOPE: CPT

## 2019-04-09 PROCEDURE — 82962 GLUCOSE BLOOD TEST: CPT

## 2019-04-09 PROCEDURE — 84439 ASSAY OF FREE THYROXINE: CPT

## 2019-04-09 PROCEDURE — 93010 EKG 12-LEAD: ICD-10-PCS | Mod: ,,, | Performed by: INTERNAL MEDICINE

## 2019-04-09 PROCEDURE — 93010 ELECTROCARDIOGRAM REPORT: CPT | Mod: ,,, | Performed by: INTERNAL MEDICINE

## 2019-04-09 PROCEDURE — 99285 EMERGENCY DEPT VISIT HI MDM: CPT | Mod: 25

## 2019-04-09 PROCEDURE — 85730 THROMBOPLASTIN TIME PARTIAL: CPT

## 2019-04-09 PROCEDURE — 84484 ASSAY OF TROPONIN QUANT: CPT

## 2019-04-09 PROCEDURE — 80053 COMPREHEN METABOLIC PANEL: CPT

## 2019-04-09 PROCEDURE — 96372 THER/PROPH/DIAG INJ SC/IM: CPT | Mod: 59 | Performed by: EMERGENCY MEDICINE

## 2019-04-09 PROCEDURE — 84484 ASSAY OF TROPONIN QUANT: CPT | Mod: 91

## 2019-04-09 PROCEDURE — 93005 ELECTROCARDIOGRAM TRACING: CPT

## 2019-04-09 PROCEDURE — 25000003 PHARM REV CODE 250: Performed by: PHYSICIAN ASSISTANT

## 2019-04-09 PROCEDURE — 85610 PROTHROMBIN TIME: CPT

## 2019-04-09 PROCEDURE — 99214 OFFICE O/P EST MOD 30 MIN: CPT | Mod: ,,, | Performed by: INTERNAL MEDICINE

## 2019-04-09 PROCEDURE — 84443 ASSAY THYROID STIM HORMONE: CPT

## 2019-04-09 PROCEDURE — 85379 FIBRIN DEGRADATION QUANT: CPT

## 2019-04-09 PROCEDURE — 83735 ASSAY OF MAGNESIUM: CPT

## 2019-04-09 PROCEDURE — 99214 PR OFFICE/OUTPT VISIT, EST, LEVL IV, 30-39 MIN: ICD-10-PCS | Mod: ,,, | Performed by: INTERNAL MEDICINE

## 2019-04-09 PROCEDURE — 63600175 PHARM REV CODE 636 W HCPCS: Performed by: PHYSICIAN ASSISTANT

## 2019-04-09 PROCEDURE — 84100 ASSAY OF PHOSPHORUS: CPT

## 2019-04-09 PROCEDURE — 85025 COMPLETE CBC W/AUTO DIFF WBC: CPT

## 2019-04-09 PROCEDURE — 63600175 PHARM REV CODE 636 W HCPCS: Performed by: EMERGENCY MEDICINE

## 2019-04-09 RX ORDER — IBUPROFEN 200 MG
24 TABLET ORAL
Status: DISCONTINUED | OUTPATIENT
Start: 2019-04-09 | End: 2019-04-10 | Stop reason: HOSPADM

## 2019-04-09 RX ORDER — INSULIN ASPART 100 [IU]/ML
20 INJECTION, SUSPENSION SUBCUTANEOUS 2 TIMES DAILY
Status: DISCONTINUED | OUTPATIENT
Start: 2019-04-09 | End: 2019-04-10 | Stop reason: HOSPADM

## 2019-04-09 RX ORDER — ACETAMINOPHEN 500 MG
500 TABLET ORAL EVERY 6 HOURS PRN
Status: DISCONTINUED | OUTPATIENT
Start: 2019-04-09 | End: 2019-04-10 | Stop reason: HOSPADM

## 2019-04-09 RX ORDER — IBUPROFEN 200 MG
16 TABLET ORAL
Status: DISCONTINUED | OUTPATIENT
Start: 2019-04-09 | End: 2019-04-10 | Stop reason: HOSPADM

## 2019-04-09 RX ORDER — ONDANSETRON 2 MG/ML
4 INJECTION INTRAMUSCULAR; INTRAVENOUS EVERY 8 HOURS PRN
Status: DISCONTINUED | OUTPATIENT
Start: 2019-04-09 | End: 2019-04-10 | Stop reason: HOSPADM

## 2019-04-09 RX ORDER — HEPARIN SODIUM 5000 [USP'U]/ML
5000 INJECTION, SOLUTION INTRAVENOUS; SUBCUTANEOUS EVERY 8 HOURS
Status: DISCONTINUED | OUTPATIENT
Start: 2019-04-09 | End: 2019-04-10 | Stop reason: HOSPADM

## 2019-04-09 RX ORDER — FLUOXETINE HYDROCHLORIDE 20 MG/1
20 CAPSULE ORAL DAILY
Status: DISCONTINUED | OUTPATIENT
Start: 2019-04-10 | End: 2019-04-10 | Stop reason: HOSPADM

## 2019-04-09 RX ORDER — RAMELTEON 8 MG/1
8 TABLET ORAL NIGHTLY PRN
Status: DISCONTINUED | OUTPATIENT
Start: 2019-04-09 | End: 2019-04-10 | Stop reason: HOSPADM

## 2019-04-09 RX ORDER — SODIUM CHLORIDE 0.9 % (FLUSH) 0.9 %
10 SYRINGE (ML) INJECTION
Status: DISCONTINUED | OUTPATIENT
Start: 2019-04-09 | End: 2019-04-10 | Stop reason: HOSPADM

## 2019-04-09 RX ORDER — INSULIN ASPART 100 [IU]/ML
40 INJECTION, SUSPENSION SUBCUTANEOUS 2 TIMES DAILY
Status: DISCONTINUED | OUTPATIENT
Start: 2019-04-09 | End: 2019-04-09

## 2019-04-09 RX ORDER — HYDROCHLOROTHIAZIDE 25 MG/1
25 TABLET ORAL DAILY
Status: DISCONTINUED | OUTPATIENT
Start: 2019-04-10 | End: 2019-04-10 | Stop reason: HOSPADM

## 2019-04-09 RX ORDER — INSULIN ASPART 100 [IU]/ML
0-5 INJECTION, SOLUTION INTRAVENOUS; SUBCUTANEOUS
Status: DISCONTINUED | OUTPATIENT
Start: 2019-04-09 | End: 2019-04-10 | Stop reason: HOSPADM

## 2019-04-09 RX ORDER — METOPROLOL SUCCINATE 25 MG/1
25 TABLET, EXTENDED RELEASE ORAL DAILY
Status: DISCONTINUED | OUTPATIENT
Start: 2019-04-10 | End: 2019-04-10 | Stop reason: HOSPADM

## 2019-04-09 RX ORDER — GLUCAGON 1 MG
1 KIT INJECTION
Status: DISCONTINUED | OUTPATIENT
Start: 2019-04-09 | End: 2019-04-10 | Stop reason: HOSPADM

## 2019-04-09 RX ORDER — AMLODIPINE BESYLATE 5 MG/1
10 TABLET ORAL DAILY
Status: DISCONTINUED | OUTPATIENT
Start: 2019-04-10 | End: 2019-04-10 | Stop reason: HOSPADM

## 2019-04-09 RX ORDER — ATORVASTATIN CALCIUM 40 MG/1
80 TABLET, FILM COATED ORAL NIGHTLY
Status: DISCONTINUED | OUTPATIENT
Start: 2019-04-09 | End: 2019-04-10 | Stop reason: HOSPADM

## 2019-04-09 RX ADMIN — TICAGRELOR 90 MG: 90 TABLET ORAL at 09:04

## 2019-04-09 RX ADMIN — HEPARIN SODIUM 5000 UNITS: 5000 INJECTION, SOLUTION INTRAVENOUS; SUBCUTANEOUS at 10:04

## 2019-04-09 RX ADMIN — ATORVASTATIN CALCIUM 80 MG: 40 TABLET, FILM COATED ORAL at 09:04

## 2019-04-09 RX ADMIN — INSULIN ASPART 20 UNITS: 100 INJECTION, SUSPENSION SUBCUTANEOUS at 09:04

## 2019-04-09 NOTE — PLAN OF CARE
Problem: Adult Inpatient Plan of Care  Goal: Plan of Care Review  Outcome: Ongoing (interventions implemented as appropriate)     04/09/19 1843   Plan of Care Review   Plan of Care Reviewed With patient       Problem: Diabetes Comorbidity  Goal: Blood Glucose Level Within Desired Range  Outcome: Ongoing (interventions implemented as appropriate)  Intervention: Maintain Glycemic Control     04/09/19 1843   Monitor and Manage Ketoacidosis   Glycemic Management blood glucose monitoring         Problem: Fall Injury Risk  Goal: Absence of Fall and Fall-Related Injury  Outcome: Ongoing (interventions implemented as appropriate)  Intervention: Promote Injury-Free Environment     04/09/19 1820 04/09/19 1843   Optimize [place] and Functional Mobility   Environmental Safety Modification  --  assistive device/personal items within reach;clutter free environment maintained;lighting adjusted   Optimize Balance and Safe Activity   Safety Promotion/Fall Prevention assistive device/personal item within reach;Fall Risk reviewed with patient/family;Fall Risk signage in place;side rails raised x 2  --          Problem: Pain (Acute Coronary Syndrome)  Goal: Absence of Cardiac-Related Pain    Intervention: Manage Cardiac Pain Symptoms     04/09/19 1843   Manage Acute Chest Pain   Chest Pain Intervention cardiac biomarkers drawn;cardiac monitor placed     Pt remains pain free at this time.

## 2019-04-09 NOTE — ASSESSMENT & PLAN NOTE
Recent STEMI with multiple vessel diease. EKG without STEMI today, troponin of 0.04. D-Dimer elevated. Unable to do CTA due to allergy.  Cardiology consulted from ED recommend stress test in AM  V/Q pending  DVT US  Telemetry  Troponin trend  Continue home brilinta/statin  NPO after midnight for stress in AM.

## 2019-04-09 NOTE — HPI
Lorena Contreras 68 y.o. female with DM2, CAD with STEMI on 3/30, and history of lymphoma in remission presents to the hospital with a chief complaint of chest pain/body tingling. She reports she experiencing sternal chest pain that she is unable to quantify and radiated to her neck. It was present for a few hours then resolved on its own. It was also associated with diffuse body tingling which she reports is a similar presentation to her previous MI. She denies any other associated symptoms and reports she is complaint free now. She is compliant with all her home medications.     On 3/29 she was admitted to the hospital for inferior STEMI with stent placement to RCA. Stenosis seen on LAD and and left circumflex.     In the ED EKG without STEMI elevation, troponin 0.04, chest x-ray without acute process, cardiology recommended observation for stress test in AM.

## 2019-04-09 NOTE — SUBJECTIVE & OBJECTIVE
"Past Medical History:   Diagnosis Date    Anxiety     Diabetes mellitus, type II     Follicular lymphoma     HTN (hypertension)     Restless leg syndrome        Past Surgical History:   Procedure Laterality Date    COLONOSCOPY  11/07/2012    Colon polyp found; repeat in 5 years    ELBOW SURGERY      ESOPHAGOGASTRODUODENOSCOPY  11/07/2012    atrophic gastritis, H pylori testing negative    KNEE SURGERY      Left heart cath Left 3/29/2019    Performed by Bladimir Barbosa [facility] at [facility] CATH LAB    Percutaneous coronary intervention N/A 3/29/2019    Performed by Bladimir Barbosa MD at [facility] CATH LAB       Review of patient's allergies indicates:   Allergen Reactions    Novolin 70/30 (semi-synthetic) Nausea And Vomiting     Severe vomiting on Relion 70/30    Shellfish containing products Swelling    Victoza [liraglutide] Nausea And Vomiting    Glipizide Nausea Only    Asa [aspirin]     Citric acid     Codeine     Egg derived     Iodine and iodide containing products     Rituxan [rituximab]     Soy     Sulfa (sulfonamide antibiotics)     Talwin [pentazocine lactate]     Zoloft [sertraline]        No current facility-administered medications on file prior to encounter.      Current Outpatient Medications on File Prior to Encounter   Medication Sig    amLODIPine (NORVASC) 10 MG tablet TAKE ONE TABLET BY MOUTH ONCE DAILY    atorvastatin (LIPITOR) 80 MG tablet Take 1 tablet (80 mg total) by mouth every evening.    bismuth tribrom-petrolatum,wh (XEROFORM) 5 X 9 " Bndg Apply dressing to wound daily    blood sugar diagnostic (FREESTYLE TEST) Strp 1 strip by Misc.(Non-Drug; Combo Route) route 4 (four) times daily.    cholecalciferol, vitamin D3, (VITAMIN D3) 2,000 unit Cap Take 2 capsules by mouth 2 (two) times daily.    esomeprazole (NEXIUM) 20 MG capsule Take 20 mg by mouth before breakfast.    hydroCHLOROthiazide (HYDRODIURIL) 25 MG tablet TAKE 1 TABLET BY MOUTH ONCE DAILY    insulin aspart " "protamine-insulin aspart (NOVOLOG 70/30) 100 unit/mL (70-30) InPn pen Inject 40 Units into the skin 2 (two) times daily.    insulin aspart protamine-insulin aspart (NOVOLOG MIX 70-30FLEXPEN U-100) 100 unit/mL (70-30) InPn pen Inject 30 Units into the skin 2 (two) times daily before meals.    lancets 32 gauge Misc 1 lancet by Misc.(Non-Drug; Combo Route) route 4 (four) times daily.    magnesium oxide (MAG-OX) 400 mg tablet Take 400 mg by mouth once daily.    meclizine (ANTIVERT) 25 mg tablet Take 1 tablet (25 mg total) by mouth 3 (three) times daily as needed.    metFORMIN (GLUCOPHAGE) 1000 MG tablet TAKE ONE TABLET BY MOUTH TWICE DAILY WITH MEALS    metoprolol succinate (TOPROL-XL) 25 MG 24 hr tablet Take 1 tablet (25 mg total) by mouth once daily.    nitroGLYCERIN (NITROSTAT) 0.4 MG SL tablet Place 1 tablet (0.4 mg total) under the tongue every 5 (five) minutes as needed for Chest pain.    pantoprazole (PROTONIX) 40 MG tablet Take 1 tablet (40 mg total) by mouth once daily.    pen needle, diabetic 32 gauge x 5/32" Ndle Use with Novolog Mix Flexpens    ticagrelor (BRILINTA) 90 mg tablet Take 1 tablet (90 mg total) by mouth 2 (two) times daily.    tolnaftate (TINACTIN) 1 % cream Apply topically 2 (two) times daily. (Patient taking differently: Apply topically 2 (two) times daily as needed. )    triamcinolone acetonide 0.1% (KENALOG) 0.1 % cream APPLY  CREAM EXTERNALLY TO AFFECTED AREA TWICE DAILY AS NEEDED    DULoxetine (CYMBALTA) 60 MG capsule Take 1 capsule (60 mg total) by mouth once daily.    FLUoxetine 20 MG capsule Take 1 capsule (20 mg total) by mouth once daily.    furosemide (LASIX) 20 MG tablet Take 1 tablet (20 mg total) by mouth daily as needed (leg swelling).    SITagliptin (JANUVIA) 100 MG Tab Take 1 tablet (100 mg total) by mouth once daily.     Family History     Problem Relation (Age of Onset)    Cancer Mother, Father    Diabetes Sister    Heart disease Mother    Hypertension Sister "        Tobacco Use    Smoking status: Never Smoker    Smokeless tobacco: Never Used   Substance and Sexual Activity    Alcohol use: No    Drug use: No    Sexual activity: Not Currently     Review of Systems   Constitutional: Negative for chills and fever.        Body tingling   HENT: Negative for nosebleeds and tinnitus.    Eyes: Negative for photophobia and visual disturbance.   Respiratory: Negative for shortness of breath and wheezing.    Cardiovascular: Positive for chest pain. Negative for palpitations and leg swelling.   Gastrointestinal: Negative for abdominal distention, nausea and vomiting.   Genitourinary: Negative for dysuria, flank pain and hematuria.   Musculoskeletal: Negative for gait problem and joint swelling.   Skin: Negative for rash and wound.   Neurological: Negative for seizures and syncope.     Objective:     Vital Signs (Most Recent):  Temp: 98.7 °F (37.1 °C) (04/09/19 1428)  Pulse: 90 (04/09/19 1428)  Resp: 18 (04/09/19 1428)  BP: (!) 140/65 (04/09/19 1428)  SpO2: 100 % (04/09/19 1428) Vital Signs (24h Range):  Temp:  [98.7 °F (37.1 °C)-99.2 °F (37.3 °C)] 98.7 °F (37.1 °C)  Pulse:  [] 90  Resp:  [17-20] 18  SpO2:  [99 %-100 %] 100 %  BP: (140-177)/(60-72) 140/65     Weight: 90.7 kg (200 lb)  Body mass index is 29.53 kg/m².    Physical Exam   Constitutional: She is oriented to person, place, and time. She appears well-developed and well-nourished. No distress.   HENT:   Head: Normocephalic and atraumatic.   Right Ear: External ear normal.   Left Ear: External ear normal.   Eyes: Conjunctivae and EOM are normal. Right eye exhibits no discharge. Left eye exhibits no discharge.   Neck: Normal range of motion. No thyromegaly present.   Cardiovascular: Normal rate and regular rhythm.   No murmur heard.  Pulmonary/Chest: Effort normal and breath sounds normal. No respiratory distress. She exhibits no tenderness.   Abdominal: Soft. Bowel sounds are normal. She exhibits no distension and  no mass. There is no tenderness.   Musculoskeletal: She exhibits no edema or deformity.   Ecchymosis to right groin from previous [facility]. Patient reports improving.   Neurological: She is alert and oriented to person, place, and time.   Skin: Skin is warm and dry.   Psychiatric: She has a normal mood and affect. Her behavior is normal.   Nursing note and vitals reviewed.        CRANIAL NERVES     CN III, IV, VI   Extraocular motions are normal.        Significant Labs:   CBC:   Recent Labs   Lab 04/09/19  1237   WBC 10.13   HGB 11.3*   HCT 35.1*        CMP:   Recent Labs   Lab 04/09/19  1237      K 3.8      CO2 25   *   BUN 22   CREATININE 1.1   CALCIUM 10.6*   PROT 7.6   ALBUMIN 3.5   BILITOT 0.3   ALKPHOS 141*   AST 32   ALT 27   ANIONGAP 10   EGFRNONAA 52*     Cardiac Markers: No results for input(s): CKMB, MYOGLOBIN, BNP, TROPISTAT in the last 48 hours.  Coagulation:   Recent Labs   Lab 04/09/19  1237   INR 1.0   APTT 31.1     Troponin:   Recent Labs   Lab 04/09/19  1237   TROPONINI 0.046*     TSH:   Recent Labs   Lab 04/09/19  1237   TSH 0.371*     Urine Studies:   Recent Labs   Lab 04/09/19  1425   COLORU Yellow   APPEARANCEUA Clear   PHUR 6.0   SPECGRAV 1.010   PROTEINUA 1+*   GLUCUA Negative   KETONESU Negative   BILIRUBINUA Negative   OCCULTUA Negative   NITRITE Negative   UROBILINOGEN Negative   LEUKOCYTESUR Negative   RBCUA 1   WBCUA 1   BACTERIA Rare   SQUAMEPITHEL 3   HYALINECASTS 0       Significant Imaging:   Imaging Results          NM Lung Scan Ventilation Perfusion (In process)                X-Ray Chest AP Portable (Final result)  Result time 04/09/19 13:15:54    Final result by Muriel Thomas MD (04/09/19 13:15:54)                 Impression:      No acute intrathoracic process seen      Electronically signed by: Muriel Thomas MD  Date:    04/09/2019  Time:    13:15             Narrative:    EXAMINATION:  XR CHEST AP PORTABLE    CLINICAL HISTORY:  chest  pain;    TECHNIQUE:  Single frontal view of the chest was performed.    COMPARISON:  03/29/2019    FINDINGS:  In EKG wires overlie the chest.  The cardiac silhouette is midline, normal in size.  The lungs are clear.  No focal area of airspace consolidation.  No pleural effusion.  No pneumothorax.  There is atherosclerotic changes of the aorta.

## 2019-04-09 NOTE — MEDICAL/APP STUDENT
"[facility]  History & Physical    Subjective:      Chief Complaint/Reason for Admission: CP/paresthesias    Lorena Contreras is a 68 y.o. female with PMHx anxiety, CAD (s/p STEMI & LHC with PCI to RCA 3/28/19), MI, DMI II (last A1c 9.7 3/28/19), follicular lymphoma (in remission), HTN, neuropathy, who presented to [facility] ED today 4/9/19 with c/o CP and paresthesias. Pt reports this AM she was having cup of coffee and had acute onset mid sternal CP 5/10 that did not radiate, as well as lip/cheek, BL arm and BL leg tingling which she equated to "when you lay on your arm and it falls asleep." Pt became worried bc symptoms were similar to those associated with her recent MI, then went to ED. She notes the CP may have been anxiety as she has been anxious about her health since having the MI. Notes sugars anywhere from 60s-150+ and states she is attempting to see a new provider for DM in may. She denies CP, HA, slurred speech, confusion, calf or leg pain, abd pain, n/v or any recent med changes.     She then came to ED and was found to have slightly elevated troponin, EKG without acute ischemia, elevated Ddimer .71. AVSS. She was admitted for further workup and PE, ACS R/O.  VQ scan normal.     Past Medical History:   Diagnosis Date    Anxiety     Coronary artery disease     Depression     Diabetes mellitus, type II     Follicular lymphoma     HTN (hypertension)     MI (myocardial infarction) 03/2019    Restless leg syndrome      Past Surgical History:   Procedure Laterality Date    COLONOSCOPY  11/07/2012    Colon polyp found; repeat in 5 years    ELBOW SURGERY Right     dislocation repair     ESOPHAGOGASTRODUODENOSCOPY  11/07/2012    atrophic gastritis, H pylori testing negative    KNEE SURGERY Bilateral     scoped    Left heart cath Left 3/29/2019    [facility] CATH LAB    Percutaneous coronary intervention N/A 3/29/2019    Performed by Bladimir WHITING " "MD Chelsey at [facility] CATH LAB     Family History   Problem Relation Age of Onset    Cancer Mother     Heart disease Mother     Cancer Father         lung    Diabetes Sister     Hypertension Sister     Stroke Neg Hx     Hyperlipidemia Neg Hx      Social History     Tobacco Use    Smoking status: Never Smoker    Smokeless tobacco: Never Used   Substance Use Topics    Alcohol use: No    Drug use: No       PTA Medications   Medication Sig    amLODIPine (NORVASC) 10 MG tablet TAKE ONE TABLET BY MOUTH ONCE DAILY    atorvastatin (LIPITOR) 80 MG tablet Take 1 tablet (80 mg total) by mouth every evening.    bismuth tribrom-petrolatum,wh (XEROFORM) 5 X 9 " Bndg Apply dressing to wound daily    blood sugar diagnostic (FREESTYLE TEST) Strp 1 strip by Misc.(Non-Drug; Combo Route) route 4 (four) times daily.    cholecalciferol, vitamin D3, (VITAMIN D3) 2,000 unit Cap Take 2 capsules by mouth 2 (two) times daily.    esomeprazole (NEXIUM) 20 MG capsule Take 20 mg by mouth before breakfast.    FLUoxetine 20 MG capsule Take 1 capsule (20 mg total) by mouth once daily.    hydroCHLOROthiazide (HYDRODIURIL) 25 MG tablet TAKE 1 TABLET BY MOUTH ONCE DAILY    insulin aspart protamine-insulin aspart (NOVOLOG 70/30) 100 unit/mL (70-30) InPn pen Inject 40 Units into the skin 2 (two) times daily.    insulin aspart protamine-insulin aspart (NOVOLOG MIX 70-30FLEXPEN U-100) 100 unit/mL (70-30) InPn pen Inject 30 Units into the skin 2 (two) times daily before meals.    lancets 32 gauge Misc 1 lancet by Misc.(Non-Drug; Combo Route) route 4 (four) times daily.    magnesium oxide (MAG-OX) 400 mg tablet Take 400 mg by mouth once daily.    meclizine (ANTIVERT) 25 mg tablet Take 1 tablet (25 mg total) by mouth 3 (three) times daily as needed.    metFORMIN (GLUCOPHAGE) 1000 MG tablet TAKE ONE TABLET BY MOUTH TWICE DAILY WITH MEALS    metoprolol succinate (TOPROL-XL) 25 MG 24 hr tablet Take 1 tablet (25 mg total) by mouth once " "daily.    nitroGLYCERIN (NITROSTAT) 0.4 MG SL tablet Place 1 tablet (0.4 mg total) under the tongue every 5 (five) minutes as needed for Chest pain.    pantoprazole (PROTONIX) 40 MG tablet Take 1 tablet (40 mg total) by mouth once daily.    pen needle, diabetic 32 gauge x 5/32" Ndle Use with Novolog Mix Flexpens    ticagrelor (BRILINTA) 90 mg tablet Take 1 tablet (90 mg total) by mouth 2 (two) times daily.    tolnaftate (TINACTIN) 1 % cream Apply topically 2 (two) times daily. (Patient taking differently: Apply topically 2 (two) times daily as needed. )    triamcinolone acetonide 0.1% (KENALOG) 0.1 % cream APPLY  CREAM EXTERNALLY TO AFFECTED AREA TWICE DAILY AS NEEDED    furosemide (LASIX) 20 MG tablet Take 1 tablet (20 mg total) by mouth daily as needed (leg swelling).    SITagliptin (JANUVIA) 100 MG Tab Take 1 tablet (100 mg total) by mouth once daily.     Review of patient's allergies indicates:   Allergen Reactions    Novolin 70/30 (semi-synthetic) Nausea And Vomiting     Severe vomiting on Relion 70/30    Shellfish containing products Swelling    Victoza [liraglutide] Nausea And Vomiting    Glipizide Nausea Only    Asa [aspirin]     Citric acid     Codeine     Egg derived     Iodine and iodide containing products     Rituxan [rituximab]     Soy     Sulfa (sulfonamide antibiotics)     Talwin [pentazocine lactate]     Zoloft [sertraline]         Review of Systems   Constitutional: Negative for chills and fever.   Eyes: Negative for blurred vision and double vision.   Respiratory: Negative for shortness of breath and wheezing.    Cardiovascular: Positive for chest pain. Negative for palpitations and leg swelling.   Gastrointestinal: Negative for abdominal pain, blood in stool, nausea and vomiting.   Genitourinary: Negative for dysuria.   Neurological: Negative for dizziness and headaches.       Objective:      Vital Signs (Most Recent)  Temp: 97.8 °F (36.6 °C) (04/09/19 1622)  Pulse: 91 " "(04/09/19 1622)  Resp: 18 (04/09/19 1622)  BP: 136/67 (04/09/19 1724)  SpO2: 96 % (04/09/19 1622)    Vital Signs Range (Last 24H):  Temp:  [97.8 °F (36.6 °C)-99.2 °F (37.3 °C)]   Pulse:  []   Resp:  [17-20]   BP: (136-177)/(60-79)   SpO2:  [96 %-100 %]     Physical Exam   Constitutional: She is oriented to person, place, and time. She appears well-developed and well-nourished. No distress.   HENT:   Head: Normocephalic and atraumatic.   Cardiovascular: Normal rate and regular rhythm.   Pulmonary/Chest: Effort normal and breath sounds normal.   Abdominal: Soft. Bowel sounds are normal.   Neurological: She is alert and oriented to person, place, and time.   Skin: Skin is warm and dry. She is not diaphoretic.       Assessment:      Active Hospital Problems    Diagnosis  POA    *Chest pain [R07.9]  Yes    Coronary artery disease involving native coronary artery of native heart [I25.10]  Yes     March 29, 2019:  LHC and PCI of RCA -  "Patient has serial mid 99% eccentric lesions consistent with plaque rupture site and abnormal EKG findings.... We initially pre-dilated with 3.0 balloon.  We placed a 3.0 by 12 mm resolute kenya stent in the midportion of the vessel.  We overlapped that with a 3.5 x 15 mm resolute kenya stent in the more proximal portion mid RCA.  Good result was achieved with MADINA 3 flow through the vessel.  Lad has a mid 90% stenosis and the left circumflex as well as the long 95% lesion as well.       Uncontrolled type 2 diabetes mellitus with diabetic polyneuropathy, with long-term current use of insulin [E11.42, Z79.4, E11.65]  Not Applicable     Chronic    Diabetic peripheral neuropathy associated with type 2 diabetes mellitus [E11.42]  Yes     Chronic    Hyperlipidemia associated with type 2 diabetes mellitus [E11.69, E78.5]  Yes     Chronic    Anxiety [F41.9]  Yes     Chronic    Hypertension associated with diabetes [E11.59, I10]  Yes     Chronic      Resolved Hospital Problems   No " resolved problems to display.       Plan:      Chest pain   - may be of anxiety origin   - cards consulted   - stress test clarissa for ACS R/O  - trend troponin (.039)  - monitor on tele  - VQ negative    CAD  - s/p PCI to the RCA 2/28/19  - Known diffuse left circumflex/OM and mid LAD disease  - Continue medicines for secondary prevention  - continue brilinta      Anxiety   - on prozac   - sees PCP   - may be cause of CP    DMII   - uncontrolled  - last A1c 9.7  3/28/19  - Pt states she has appt with new provider in may   - sliding scale insulin   - diabetic diet  - pt states the novolog 70-30is too much as her blood sugar has been in 60s at times...    HLD  - continue atrovastatin 80 mg  - hasnt had lipid panel since 2017, but pt maxed atorvastatin  - repeat lipid panel

## 2019-04-09 NOTE — ASSESSMENT & PLAN NOTE
Somewhat atypical symptoms but she says similar in presentation to MI recently  Rule out for ACS  Plan for ischemic workup in a.m. likely with NST if rules out

## 2019-04-09 NOTE — ED TRIAGE NOTES
68 y.o. Female presents to the ED with chief complaint of chest pain and tingling. Patient reports hx of MI recently in the last month and felt chest pain while resting on her chair today. Patient states tingling to entire body noted from head to toe with associated weakness starting at 1000. Patient denies recent injuries. Patient states no medication was taken for relief. Patient resting in bed in NAD. Side rails up x2.

## 2019-04-09 NOTE — SUBJECTIVE & OBJECTIVE
"Past Medical History:   Diagnosis Date    Anxiety     Coronary artery disease     Depression     Diabetes mellitus, type II     Follicular lymphoma     HTN (hypertension)     MI (myocardial infarction) 03/2019    Restless leg syndrome        Past Surgical History:   Procedure Laterality Date    COLONOSCOPY  11/07/2012    Colon polyp found; repeat in 5 years    ELBOW SURGERY Right     dislocation repair     ESOPHAGOGASTRODUODENOSCOPY  11/07/2012    atrophic gastritis, H pylori testing negative    KNEE SURGERY Bilateral     scoped    Left heart cath Left 3/29/2019    Performed by Bladimir Barbosa MD at [facility] CATH LAB    Percutaneous coronary intervention N/A 3/29/2019    Performed by Bladimir Barbosa MD at [facility] CATH LAB       Review of patient's allergies indicates:   Allergen Reactions    Novolin 70/30 (semi-synthetic) Nausea And Vomiting     Severe vomiting on Relion 70/30    Shellfish containing products Swelling    Victoza [liraglutide] Nausea And Vomiting    Glipizide Nausea Only    Asa [aspirin]     Citric acid     Codeine     Egg derived     Iodine and iodide containing products     Rituxan [rituximab]     Soy     Sulfa (sulfonamide antibiotics)     Talwin [pentazocine lactate]     Zoloft [sertraline]        No current facility-administered medications on file prior to encounter.      Current Outpatient Medications on File Prior to Encounter   Medication Sig    amLODIPine (NORVASC) 10 MG tablet TAKE ONE TABLET BY MOUTH ONCE DAILY    atorvastatin (LIPITOR) 80 MG tablet Take 1 tablet (80 mg total) by mouth every evening.    bismuth tribrom-petrolatum,wh (XEROFORM) 5 X 9 " Bndg Apply dressing to wound daily    blood sugar diagnostic (FREESTYLE TEST) Strp 1 strip by Misc.(Non-Drug; Combo Route) route 4 (four) times daily.    cholecalciferol, vitamin D3, (VITAMIN D3) 2,000 unit Cap Take 2 capsules by mouth 2 (two) times daily.    esomeprazole (NEXIUM) 20 MG capsule Take 20 mg by " "mouth before breakfast.    FLUoxetine 20 MG capsule Take 1 capsule (20 mg total) by mouth once daily.    hydroCHLOROthiazide (HYDRODIURIL) 25 MG tablet TAKE 1 TABLET BY MOUTH ONCE DAILY    insulin aspart protamine-insulin aspart (NOVOLOG 70/30) 100 unit/mL (70-30) InPn pen Inject 40 Units into the skin 2 (two) times daily.    insulin aspart protamine-insulin aspart (NOVOLOG MIX 70-30FLEXPEN U-100) 100 unit/mL (70-30) InPn pen Inject 30 Units into the skin 2 (two) times daily before meals.    lancets 32 gauge Misc 1 lancet by Misc.(Non-Drug; Combo Route) route 4 (four) times daily.    magnesium oxide (MAG-OX) 400 mg tablet Take 400 mg by mouth once daily.    meclizine (ANTIVERT) 25 mg tablet Take 1 tablet (25 mg total) by mouth 3 (three) times daily as needed.    metFORMIN (GLUCOPHAGE) 1000 MG tablet TAKE ONE TABLET BY MOUTH TWICE DAILY WITH MEALS    metoprolol succinate (TOPROL-XL) 25 MG 24 hr tablet Take 1 tablet (25 mg total) by mouth once daily.    nitroGLYCERIN (NITROSTAT) 0.4 MG SL tablet Place 1 tablet (0.4 mg total) under the tongue every 5 (five) minutes as needed for Chest pain.    pantoprazole (PROTONIX) 40 MG tablet Take 1 tablet (40 mg total) by mouth once daily.    pen needle, diabetic 32 gauge x 5/32" Ndle Use with Novolog Mix Flexpens    ticagrelor (BRILINTA) 90 mg tablet Take 1 tablet (90 mg total) by mouth 2 (two) times daily.    tolnaftate (TINACTIN) 1 % cream Apply topically 2 (two) times daily. (Patient taking differently: Apply topically 2 (two) times daily as needed. )    triamcinolone acetonide 0.1% (KENALOG) 0.1 % cream APPLY  CREAM EXTERNALLY TO AFFECTED AREA TWICE DAILY AS NEEDED    furosemide (LASIX) 20 MG tablet Take 1 tablet (20 mg total) by mouth daily as needed (leg swelling).    SITagliptin (JANUVIA) 100 MG Tab Take 1 tablet (100 mg total) by mouth once daily.    [DISCONTINUED] DULoxetine (CYMBALTA) 60 MG capsule Take 1 capsule (60 mg total) by mouth once daily. "     Family History     Problem Relation (Age of Onset)    Cancer Mother, Father    Diabetes Sister    Heart disease Mother    Hypertension Sister        Tobacco Use    Smoking status: Never Smoker    Smokeless tobacco: Never Used   Substance and Sexual Activity    Alcohol use: No    Drug use: No    Sexual activity: Not Currently     Review of Systems   Constitution: Negative.   HENT: Negative.    Eyes: Negative.    Cardiovascular: Positive for chest pain. Negative for dyspnea on exertion, irregular heartbeat, leg swelling, near-syncope, orthopnea, palpitations, paroxysmal nocturnal dyspnea and syncope.   Respiratory: Negative for shortness of breath.    Skin: Negative.    Musculoskeletal: Negative.    Gastrointestinal: Negative for abdominal pain, constipation and diarrhea.   Genitourinary: Negative for dysuria.   Neurological: Positive for paresthesias and weakness.   Psychiatric/Behavioral: Negative.      Objective:     Vital Signs (Most Recent):  Temp: 97.8 °F (36.6 °C) (04/09/19 1622)  Pulse: 91 (04/09/19 1622)  Resp: 18 (04/09/19 1622)  BP: (!) 176/79 (04/09/19 1622)  SpO2: 96 % (04/09/19 1622) Vital Signs (24h Range):  Temp:  [97.8 °F (36.6 °C)-99.2 °F (37.3 °C)] 97.8 °F (36.6 °C)  Pulse:  [] 91  Resp:  [17-20] 18  SpO2:  [96 %-100 %] 96 %  BP: (140-177)/(60-79) 176/79     Weight: 90.3 kg (199 lb)  Body mass index is 29.39 kg/m².    SpO2: 96 %  O2 Device (Oxygen Therapy): room air    No intake or output data in the 24 hours ending 04/09/19 1719    Lines/Drains/Airways     Peripheral Intravenous Line                 Peripheral IV - Single Lumen 04/09/19 1254 Right Forearm less than 1 day                Physical Exam   Constitutional: She is oriented to person, place, and time. She appears well-developed and well-nourished.   HENT:   Head: Normocephalic and atraumatic.   Eyes: Pupils are equal, round, and reactive to light. Conjunctivae and EOM are normal.   Neck: Normal range of motion. Neck supple.  No thyromegaly present.   Cardiovascular: Normal rate and regular rhythm.   No murmur heard.  Pulmonary/Chest: Effort normal and breath sounds normal. No respiratory distress.   Abdominal: Soft. Bowel sounds are normal.   Musculoskeletal: She exhibits no edema.   Neurological: She is alert and oriented to person, place, and time.   Skin: Skin is warm and dry.   Psychiatric: She has a normal mood and affect. Her behavior is normal.       Significant Labs:   CMP   Recent Labs   Lab 04/09/19  1237      K 3.8      CO2 25   *   BUN 22   CREATININE 1.1   CALCIUM 10.6*   PROT 7.6   ALBUMIN 3.5   BILITOT 0.3   ALKPHOS 141*   AST 32   ALT 27   ANIONGAP 10   ESTGFRAFRICA 60   EGFRNONAA 52*   , CBC   Recent Labs   Lab 04/09/19  1237   WBC 10.13   HGB 11.3*   HCT 35.1*      , INR   Recent Labs   Lab 04/09/19  1237   INR 1.0   , Lipid Panel No results for input(s): CHOL, HDL, LDLCALC, TRIG, CHOLHDL in the last 48 hours. and Troponin   Recent Labs   Lab 04/09/19  1237   TROPONINI 0.046*       Significant Imaging: Echocardiogram:   Transthoracic echo (TTE) complete (Cupid Only):   Results for orders placed or performed during the hospital encounter of 03/29/19   Transthoracic echo (TTE) 2D with Color Flow   Result Value Ref Range    Ascending aorta 2.60 cm    STJ 2.12 cm    AV mean gradient 8.80 mmHg    Ao peak mike 1.88 m/s    Ao VTI 44.27 cm    IVRT 0.09 msec    IVS 1.36 (A) 0.6 - 1.1 cm    LA size 4.41 cm    Left Atrium Major Axis 6.02 cm    Left Atrium Minor Axis 5.91 cm    LVIDD 4.14 3.5 - 6.0 cm    LVIDS 2.92 2.1 - 4.0 cm    LVOT diameter 1.81 cm    LVOT peak VTI 26.26 cm    PW 1.50 (A) 0.6 - 1.1 cm    MV Peak A Mike 1.43 m/s    E wave decelartion time 188.11 msec    MV Peak E Mike 1.63 m/s    PV Peak D Mike 0.60 m/s    PV Peak S Mike 0.76 m/s    RA Major Axis 6.16 cm    RA Width 4.47 cm    RVDD 3.78 cm    Sinus 2.63 cm    TAPSE 2.03 cm    TR Max Mike 3.50 m/s    TDI LATERAL 0.10     TDI SEPTAL 0.05      LA WIDTH 4.65 cm    Ao root annulus 2.58 cm    AORTIC VALVE CUSP SEPERATION 1.81 cm    PV PEAK VELOCITY 1.10 cm/s    LV Diastolic Volume 76.09 mL    LV Systolic Volume 32.86 mL    RV S' 12.17 m/s    LVOT peak fernando 1.429105805 m/s    LV LATERAL E/E' RATIO 16.30     LV SEPTAL E/E' RATIO 32.60     FS 29 %    LA volume 103.96 cm3    LV mass 226.94 g    Left Ventricle Relative Wall Thickness 0.72 cm    AV valve area 1.53 cm2    AV Velocity Ratio 0.56     AV index (prosthetic) 0.59     E/A ratio 1.14     Mean e' 0.08     Pulm vein S/D ratio 1.27     LVOT area 2.57 cm2    LVOT stroke volume 67.53 cm3    AV peak gradient 14.14 mmHg    E/E' ratio 21.73     LV Systolic Volume Index 15.5 mL/m2    LV Diastolic Volume Index 35.86 mL/m2    LA Volume Index 49.0 mL/m2    LV Mass Index 107.0 g/m2    Triscuspid Valve Regurgitation Peak Gradient 49.00 mmHg    BSA 2.17 m2    Right Atrial Pressure (from IVC) 3 mmHg    TV rest pulmonary artery pressure 52 mmHg     · Normal left ventricular systolic function. The estimated ejection fraction is 55%  · Concentric left ventricular hypertrophy.  · Grade II (moderate) left ventricular diastolic dysfunction consistent with pseudonormalization.  · Elevated left atrial pressure.  · Severe left atrial enlargement.  · The estimated PA systolic pressure is 52 mm Hg  · Pulmonary hypertension present.     C: 3-19  · Three vessel coronary artery disease.  · Successful PCI for acute myocardial infarction of culprit RCA.  · Prox RCA lesion , 99% stenosed reduced to 0%..  · A STENT RESOLUTE CRISSY [id number] stent was successfully placed at 14 ROGER  · Mid RCA lesion , 95% stenosed reduced to 0%..  · A STENT RESOLUTE CRISSY [id number] stent was successfully placed at 12 ROGER  · Prox Cx to Dist Cx lesion , 95% stenosed.  · Ost 1st Mrg to 1st Mrg lesion , 90% stenosed.  · Diffusely diseased LAD which is small vessel as well

## 2019-04-09 NOTE — HPI
68-year-old female PMHx DM, HTN, MI and PSHx heart catheterization anxiety who presents with sudden onset full-body paresthesias that began at approximately 10:00 this morning while she was sitting down. There is associated facial numbness localized to her bilateral cheeks and lips. She also complains of constant midsternal chest pain that began en route to the hospital. Her diet is normal. Her bowel movements are normal. Patient expresses concern because she had paresthesias in her arm and back prior to having her last heart attack. She denies any new soaps or shampoos. She denies myalgias.     She is known to me from recent acute MI presentation.  Says this is in somewhat way similar to presentation prior to recent MI where she underwent revascularization of the RCA.  She has not had to take any nitroglycerin does not have any home.  She denies any other associated symptoms.  She has experienced no PND, orthopnea or lower extremity edema.  She denies any dizziness, presyncope or syncope.

## 2019-04-09 NOTE — ASSESSMENT & PLAN NOTE
BG of 165 on admit. Will continue home 70/30 insulin, sliding scale, POCT glucose checks, hypoglyecmic protocol, and diabetic diet.

## 2019-04-09 NOTE — NURSING
Pt arrive to the unit by stretcher accompanied by transport.   Assisted pt to bed. Tele monitoring initiated.  Admit assessment initiated.  Pt is AAOx4.  No distress noted.

## 2019-04-09 NOTE — ASSESSMENT & PLAN NOTE
Status post recent PCI to the RCA  Known diffuse left circumflex/OM and mid LAD disease which is somewhat small vessel  Plan was for risk stratification with NST  Continue medicines for secondary prevention

## 2019-04-09 NOTE — H&P
[facility] Hospital Medicine  History & Physical    Patient Name: Lorena Contreras  MRN: [id number]  Admission Date: 4/9/2019  Attending Physician: Pio Goel MD   Primary Care Provider: Kenneth Luu MD         Patient information was obtained from patient, past medical records and ER records.     Subjective:     Principal Problem:Chest pain    Chief Complaint:   Chief Complaint   Patient presents with    Tingling     tingling to the entire body from head to toe, weakness, midsternal CP begining at approx 1000    Chest Pain        HPI: Lorena Contreras 68 y.o. female with DM2, CAD with STEMI on 3/30, and history of lymphoma in remission presents to the hospital with a chief complaint of chest pain/body tingling. She reports she experiencing sternal chest pain that she is unable to quantify and radiated to her neck. It was present for a few hours then resolved on its own. It was also associated with diffuse body tingling which she reports is a similar presentation to her previous MI. She denies any other associated symptoms and reports she is complaint free now. She is compliant with all her home medications.     On 3/29 she was admitted to the hospital for inferior STEMI with stent placement to RCA. Stenosis seen on LAD and and left circumflex.     In the ED EKG without STEMI elevation, troponin 0.04, chest x-ray without acute process, cardiology recommended observation for stress test in AM.     Past Medical History:   Diagnosis Date    Anxiety     Diabetes mellitus, type II     Follicular lymphoma     HTN (hypertension)     Restless leg syndrome        Past Surgical History:   Procedure Laterality Date    COLONOSCOPY  11/07/2012    Colon polyp found; repeat in 5 years    ELBOW SURGERY      ESOPHAGOGASTRODUODENOSCOPY  11/07/2012    atrophic gastritis, H pylori testing negative    KNEE SURGERY      Left heart cath Left 3/29/2019    Performed by [facility] at [facility]  "CATH LAB    Percutaneous coronary intervention N/A 3/29/2019    Per[facility] by [facility] at [facility] CATH LAB       Review of patient's allergies indicates:   Allergen Reactions    Novolin 70/30 (semi-synthetic) Nausea And Vomiting     Severe vomiting on Relion 70/30    Shellfish containing products Swelling    Victoza [liraglutide] Nausea And Vomiting    Glipizide Nausea Only    Asa [aspirin]     Citric acid     Codeine     Egg derived     Iodine and iodide containing products     Rituxan [rituximab]     Soy     Sulfa (sulfonamide antibiotics)     Talwin [pentazocine lactate]     Zoloft [sertraline]        No current facility-administered medications on file prior to encounter.      Current Outpatient Medications on File Prior to Encounter   Medication Sig    amLODIPine (NORVASC) 10 MG tablet TAKE ONE TABLET BY MOUTH ONCE DAILY    atorvastatin (LIPITOR) 80 MG tablet Take 1 tablet (80 mg total) by mouth every evening.    bismuth tribrom-petrolatum,wh (XEROFORM) 5 X 9 " Bndg Apply dressing to wound daily    blood sugar diagnostic (FREESTYLE TEST) Strp 1 strip by Misc.(Non-Drug; Combo Route) route 4 (four) times daily.    cholecalciferol, vitamin D3, (VITAMIN D3) 2,000 unit Cap Take 2 capsules by mouth 2 (two) times daily.    esomeprazole (NEXIUM) 20 MG capsule Take 20 mg by mouth before breakfast.    hydroCHLOROthiazide (HYDRODIURIL) 25 MG tablet TAKE 1 TABLET BY MOUTH ONCE DAILY    insulin aspart protamine-insulin aspart (NOVOLOG 70/30) 100 unit/mL (70-30) InPn pen Inject 40 Units into the skin 2 (two) times daily.    insulin aspart protamine-insulin aspart (NOVOLOG MIX 70-30FLEXPEN U-100) 100 unit/mL (70-30) InPn pen Inject 30 Units into the skin 2 (two) times daily before meals.    lancets 32 gauge Misc 1 lancet by Misc.(Non-Drug; Combo Route) route 4 (four) times daily.    magnesium oxide (MAG-OX) 400 mg tablet Take 400 mg by mouth once daily.    meclizine (ANTIVERT) 25 mg tablet " "Take 1 tablet (25 mg total) by mouth 3 (three) times daily as needed.    metFORMIN (GLUCOPHAGE) 1000 MG tablet TAKE ONE TABLET BY MOUTH TWICE DAILY WITH MEALS    metoprolol succinate (TOPROL-XL) 25 MG 24 hr tablet Take 1 tablet (25 mg total) by mouth once daily.    nitroGLYCERIN (NITROSTAT) 0.4 MG SL tablet Place 1 tablet (0.4 mg total) under the tongue every 5 (five) minutes as needed for Chest pain.    pantoprazole (PROTONIX) 40 MG tablet Take 1 tablet (40 mg total) by mouth once daily.    pen needle, diabetic 32 gauge x 5/32" Ndle Use with Novolog Mix Flexpens    ticagrelor (BRILINTA) 90 mg tablet Take 1 tablet (90 mg total) by mouth 2 (two) times daily.    tolnaftate (TINACTIN) 1 % cream Apply topically 2 (two) times daily. (Patient taking differently: Apply topically 2 (two) times daily as needed. )    triamcinolone acetonide 0.1% (KENALOG) 0.1 % cream APPLY  CREAM EXTERNALLY TO AFFECTED AREA TWICE DAILY AS NEEDED    DULoxetine (CYMBALTA) 60 MG capsule Take 1 capsule (60 mg total) by mouth once daily.    FLUoxetine 20 MG capsule Take 1 capsule (20 mg total) by mouth once daily.    furosemide (LASIX) 20 MG tablet Take 1 tablet (20 mg total) by mouth daily as needed (leg swelling).    SITagliptin (JANUVIA) 100 MG Tab Take 1 tablet (100 mg total) by mouth once daily.     Family History     Problem Relation (Age of Onset)    Cancer Mother, Father    Diabetes Sister    Heart disease Mother    Hypertension Sister        Tobacco Use    Smoking status: Never Smoker    Smokeless tobacco: Never Used   Substance and Sexual Activity    Alcohol use: No    Drug use: No    Sexual activity: Not Currently     Review of Systems   Constitutional: Negative for chills and fever.        Body tingling   HENT: Negative for nosebleeds and tinnitus.    Eyes: Negative for photophobia and visual disturbance.   Respiratory: Negative for shortness of breath and wheezing.    Cardiovascular: Positive for chest pain. Negative " for palpitations and leg swelling.   Gastrointestinal: Negative for abdominal distention, nausea and vomiting.   Genitourinary: Negative for dysuria, flank pain and hematuria.   Musculoskeletal: Negative for gait problem and joint swelling.   Skin: Negative for rash and wound.   Neurological: Negative for seizures and syncope.     Objective:     Vital Signs (Most Recent):  Temp: 98.7 °F (37.1 °C) (04/09/19 1428)  Pulse: 90 (04/09/19 1428)  Resp: 18 (04/09/19 1428)  BP: (!) 140/65 (04/09/19 1428)  SpO2: 100 % (04/09/19 1428) Vital Signs (24h Range):  Temp:  [98.7 °F (37.1 °C)-99.2 °F (37.3 °C)] 98.7 °F (37.1 °C)  Pulse:  [] 90  Resp:  [17-20] 18  SpO2:  [99 %-100 %] 100 %  BP: (140-177)/(60-72) 140/65     Weight: 90.7 kg (200 lb)  Body mass index is 29.53 kg/m².    Physical Exam   Constitutional: She is oriented to person, place, and time. She appears well-developed and well-nourished. No distress.   HENT:   Head: Normocephalic and atraumatic.   Right Ear: External ear normal.   Left Ear: External ear normal.   Eyes: Conjunctivae and EOM are normal. Right eye exhibits no discharge. Left eye exhibits no discharge.   Neck: Normal range of motion. No thyromegaly present.   Cardiovascular: Normal rate and regular rhythm.   No murmur heard.  Pulmonary/Chest: Effort normal and breath sounds normal. No respiratory distress. She exhibits no tenderness.   Abdominal: Soft. Bowel sounds are normal. She exhibits no distension and no mass. There is no tenderness.   Musculoskeletal: She exhibits no edema or deformity.   Ecchymosis to right groin from previous [facility]. Patient reports improving.   Neurological: She is alert and oriented to person, place, and time.   Skin: Skin is warm and dry.   Psychiatric: She has a normal mood and affect. Her behavior is normal.   Nursing note and vitals reviewed.        CRANIAL NERVES     CN III, IV, VI   Extraocular motions are normal.        Significant Labs:   CBC:   Recent Labs   Lab  04/09/19  1237   WBC 10.13   HGB 11.3*   HCT 35.1*        CMP:   Recent Labs   Lab 04/09/19  1237      K 3.8      CO2 25   *   BUN 22   CREATININE 1.1   CALCIUM 10.6*   PROT 7.6   ALBUMIN 3.5   BILITOT 0.3   ALKPHOS 141*   AST 32   ALT 27   ANIONGAP 10   EGFRNONAA 52*     Cardiac Markers: No results for input(s): CKMB, MYOGLOBIN, BNP, TROPISTAT in the last 48 hours.  Coagulation:   Recent Labs   Lab 04/09/19  1237   INR 1.0   APTT 31.1     Troponin:   Recent Labs   Lab 04/09/19  1237   TROPONINI 0.046*     TSH:   Recent Labs   Lab 04/09/19  1237   TSH 0.371*     Urine Studies:   Recent Labs   Lab 04/09/19  1425   COLORU Yellow   APPEARANCEUA Clear   PHUR 6.0   SPECGRAV 1.010   PROTEINUA 1+*   GLUCUA Negative   KETONESU Negative   BILIRUBINUA Negative   OCCULTUA Negative   NITRITE Negative   UROBILINOGEN Negative   LEUKOCYTESUR Negative   RBCUA 1   WBCUA 1   BACTERIA Rare   SQUAMEPITHEL 3   HYALINECASTS 0       Significant Imaging:   Imaging Results          NM Lung Scan Ventilation Perfusion (In process)                X-Ray Chest AP Portable (Final result)  Result time 04/09/19 13:15:54    Final result by Muriel Thomas MD (04/09/19 13:15:54)                 Impression:      No acute intrathoracic process seen      Electronically signed by: Muriel Thomas MD  Date:    04/09/2019  Time:    13:15             Narrative:    EXAMINATION:  XR CHEST AP PORTABLE    CLINICAL HISTORY:  chest pain;    TECHNIQUE:  Single frontal view of the chest was performed.    COMPARISON:  03/29/2019    FINDINGS:  In EKG wires overlie the chest.  The cardiac silhouette is midline, normal in size.  The lungs are clear.  No focal area of airspace consolidation.  No pleural effusion.  No pneumothorax.  There is atherosclerotic changes of the aorta.                                  Assessment/Plan:     * Chest pain  Recent STEMI with multiple vessel diease. EKG without STEMI today, troponin of 0.04.  D-Dimer elevated. Unable to do CTA due to allergy.  Cardiology consulted from ED recommend stress test in AM  V/Q pending  DVT US  Telemetry  Troponin trend  Continue home brilinta/statin  NPO after midnight for stress in AM.     Uncontrolled type 2 diabetes mellitus with diabetic polyneuropathy, with long-term current use of insulin  Patient reports frequently finding BG in the 60's at home. Has stopped taking prandial insulin due to this and decreased dose of basal she takes in morning and night. Will decrease her 70/30 night to 15 units. sliding scale, POCT glucose checks, hypoglyecmic protocol, and diabetic diet.       Diabetic peripheral neuropathy associated with type 2 diabetes mellitus  BG of 165 on admit. Will continue home 70/30 insulin, sliding scale, POCT glucose checks, hypoglyecmic protocol, and diabetic diet.     Hyperlipidemia associated with type 2 diabetes mellitus  Continue home atorvastatin    Hypertension associated with diabetes  Will continue home amlodipine, HCTZ, metoprolol. Fair control currently.     Anxiety  Will continue home fluoxetine    VTE Risk Mitigation (From admission, onward)        Ordered     heparin (porcine) injection 5,000 Units  Every 8 hours      04/09/19 1530     IP VTE HIGH RISK PATIENT  Once      04/09/19 1530     Place sequential compression device  Until discontinued      04/09/19 1530     Place BORIS hose  Until discontinued      04/09/19 1530        VTE: heparin  Code: full  Diet: diabetic/cardiac, NPO past midnight  Dispo: Pending stress tomorrow    Rey White PA-C  Department of Hospital Medicine   [facility]

## 2019-04-09 NOTE — HOSPITAL COURSE
Lorena Contreras 68 y.o. female placed in observation for management of chest pain. In the ED EKG without STEMI elevation, troponin 0.04, chest x-ray without acute process, cardiology recommended observation for stress test in AM. NST contraindicated during observation 2/2 VQ scan taken within 24 hours. Cardiology and patient agrees to discharge and schedule outpatient NST for the am. Patient is comfortable and complaint free at this time. Trial atarax - generalized anxiety.

## 2019-04-09 NOTE — CONSULTS
[facility]  Cardiology  Consult Note    Patient Name: Lorena Contreras  MRN: [id number]  Admission Date: 4/9/2019  Hospital Length of Stay: 0 days  Code Status: Full Code   Attending Provider: Pio Goel MD   Consulting Provider: Bladimir Barbosa MD  Primary Care Physician: Kenneth Luu MD  Principal Problem:Chest pain    Patient information was obtained from patient, past medical records and ER records.     Inpatient consult to Cardiology  Consult performed by: Bladimir Barbosa MD  Consult ordered by: Raheem Reza MD  Reason for consult: Chest pain        Subjective:     Chief Complaint:  Chest pain     HPI:   68-year-old female PMHx DM, HTN, MI and PSHx heart catheterization anxiety who presents with sudden onset full-body paresthesias that began at approximately 10:00 this morning while she was sitting down. There is associated facial numbness localized to her bilateral cheeks and lips. She also complains of constant midsternal chest pain that began en route to the hospital. Her diet is normal. Her bowel movements are normal. Patient expresses concern because she had paresthesias in her arm and back prior to having her last heart attack. She denies any new soaps or shampoos. She denies myalgias.     She is known to me from recent acute MI presentation.  Says this is in somewhat way similar to presentation prior to recent MI where she underwent revascularization of the RCA.  She has not had to take any nitroglycerin does not have any home.  She denies any other associated symptoms.  She has experienced no PND, orthopnea or lower extremity edema.  She denies any dizziness, presyncope or syncope.    Past Medical History:   Diagnosis Date    Anxiety     Coronary artery disease     Depression     Diabetes mellitus, type II     Follicular lymphoma     HTN (hypertension)     MI (myocardial infarction) 03/2019    Restless leg syndrome        Past Surgical History:   Procedure  "Laterality Date    COLONOSCOPY  11/07/2012    Colon polyp found; repeat in 5 years    ELBOW SURGERY Right     dislocation repair     ESOPHAGOGASTRODUODENOSCOPY  11/07/2012    atrophic gastritis, H pylori testing negative    KNEE SURGERY Bilateral     scoped    Left heart cath Left 3/29/2019    Performed by [facility] at [facility] CATH LAB    Percutaneous coronary intervention N/A 3/29/2019    Performed by Bl[facility]imir [facility]osa MD at [facility] CATH LAB       Review of patient's allergies indicates:   Allergen Reactions    Novolin 70/30 (semi-synthetic) Nausea And Vomiting     Severe vomiting on Relion 70/30    Shellfish containing products Swelling    Victoza [liraglutide] Nausea And Vomiting    Glipizide Nausea Only    Asa [aspirin]     Citric acid     Codeine     Egg derived     Iodine and iodide containing products     Rituxan [rituximab]     Soy     Sulfa (sulfonamide antibiotics)     Talwin [pentazocine lactate]     Zoloft [sertraline]        No current facility-administered medications on file prior to encounter.      Current Outpatient Medications on File Prior to Encounter   Medication Sig    amLODIPine (NORVASC) 10 MG tablet TAKE ONE TABLET BY MOUTH ONCE DAILY    atorvastatin (LIPITOR) 80 MG tablet Take 1 tablet (80 mg total) by mouth every evening.    bismuth tribrom-petrolatum,wh (XEROFORM) 5 X 9 " Bndg Apply dressing to wound daily    blood sugar diagnostic (FREESTYLE TEST) Strp 1 strip by Misc.(Non-Drug; Combo Route) route 4 (four) times daily.    cholecalciferol, vitamin D3, (VITAMIN D3) 2,000 unit Cap Take 2 capsules by mouth 2 (two) times daily.    esomeprazole (NEXIUM) 20 MG capsule Take 20 mg by mouth before breakfast.    FLUoxetine 20 MG capsule Take 1 capsule (20 mg total) by mouth once daily.    hydroCHLOROthiazide (HYDRODIURIL) 25 MG tablet TAKE 1 TABLET BY MOUTH ONCE DAILY    insulin aspart protamine-insulin aspart (NOVOLOG 70/30) 100 unit/mL (70-30) InPn pen Inject 40 " "Units into the skin 2 (two) times daily.    insulin aspart protamine-insulin aspart (NOVOLOG MIX 70-30FLEXPEN U-100) 100 unit/mL (70-30) InPn pen Inject 30 Units into the skin 2 (two) times daily before meals.    lancets 32 gauge Misc 1 lancet by Misc.(Non-Drug; Combo Route) route 4 (four) times daily.    magnesium oxide (MAG-OX) 400 mg tablet Take 400 mg by mouth once daily.    meclizine (ANTIVERT) 25 mg tablet Take 1 tablet (25 mg total) by mouth 3 (three) times daily as needed.    metFORMIN (GLUCOPHAGE) 1000 MG tablet TAKE ONE TABLET BY MOUTH TWICE DAILY WITH MEALS    metoprolol succinate (TOPROL-XL) 25 MG 24 hr tablet Take 1 tablet (25 mg total) by mouth once daily.    nitroGLYCERIN (NITROSTAT) 0.4 MG SL tablet Place 1 tablet (0.4 mg total) under the tongue every 5 (five) minutes as needed for Chest pain.    pantoprazole (PROTONIX) 40 MG tablet Take 1 tablet (40 mg total) by mouth once daily.    pen needle, diabetic 32 gauge x 5/32" Ndle Use with Novolog Mix Flexpens    ticagrelor (BRILINTA) 90 mg tablet Take 1 tablet (90 mg total) by mouth 2 (two) times daily.    tolnaftate (TINACTIN) 1 % cream Apply topically 2 (two) times daily. (Patient taking differently: Apply topically 2 (two) times daily as needed. )    triamcinolone acetonide 0.1% (KENALOG) 0.1 % cream APPLY  CREAM EXTERNALLY TO AFFECTED AREA TWICE DAILY AS NEEDED    furosemide (LASIX) 20 MG tablet Take 1 tablet (20 mg total) by mouth daily as needed (leg swelling).    SITagliptin (JANUVIA) 100 MG Tab Take 1 tablet (100 mg total) by mouth once daily.    [DISCONTINUED] DULoxetine (CYMBALTA) 60 MG capsule Take 1 capsule (60 mg total) by mouth once daily.     Family History     Problem Relation (Age of Onset)    Cancer Mother, Father    Diabetes Sister    Heart disease Mother    Hypertension Sister        Tobacco Use    Smoking status: Never Smoker    Smokeless tobacco: Never Used   Substance and Sexual Activity    Alcohol use: No    " Drug use: No    Sexual activity: Not Currently     Review of Systems   Constitution: Negative.   HENT: Negative.    Eyes: Negative.    Cardiovascular: Positive for chest pain. Negative for dyspnea on exertion, irregular heartbeat, leg swelling, near-syncope, orthopnea, palpitations, paroxysmal nocturnal dyspnea and syncope.   Respiratory: Negative for shortness of breath.    Skin: Negative.    Musculoskeletal: Negative.    Gastrointestinal: Negative for abdominal pain, constipation and diarrhea.   Genitourinary: Negative for dysuria.   Neurological: Positive for paresthesias and weakness.   Psychiatric/Behavioral: Negative.      Objective:     Vital Signs (Most Recent):  Temp: 97.8 °F (36.6 °C) (04/09/19 1622)  Pulse: 91 (04/09/19 1622)  Resp: 18 (04/09/19 1622)  BP: (!) 176/79 (04/09/19 1622)  SpO2: 96 % (04/09/19 1622) Vital Signs (24h Range):  Temp:  [97.8 °F (36.6 °C)-99.2 °F (37.3 °C)] 97.8 °F (36.6 °C)  Pulse:  [] 91  Resp:  [17-20] 18  SpO2:  [96 %-100 %] 96 %  BP: (140-177)/(60-79) 176/79     Weight: 90.3 kg (199 lb)  Body mass index is 29.39 kg/m².    SpO2: 96 %  O2 Device (Oxygen Therapy): room air    No intake or output data in the 24 hours ending 04/09/19 1719    Lines/Drains/Airways     Peripheral Intravenous Line                 Peripheral IV - Single Lumen 04/09/19 1254 Right Forearm less than 1 day                Physical Exam   Constitutional: She is oriented to person, place, and time. She appears well-developed and well-nourished.   HENT:   Head: Normocephalic and atraumatic.   Eyes: Pupils are equal, round, and reactive to light. Conjunctivae and EOM are normal.   Neck: Normal range of motion. Neck supple. No thyromegaly present.   Cardiovascular: Normal rate and regular rhythm.   No murmur heard.  Pulmonary/Chest: Effort normal and breath sounds normal. No respiratory distress.   Abdominal: Soft. Bowel sounds are normal.   Musculoskeletal: She exhibits no edema.   Neurological: She is  alert and oriented to person, place, and time.   Skin: Skin is warm and dry.   Psychiatric: She has a normal mood and affect. Her behavior is normal.       Significant Labs:   CMP   Recent Labs   Lab 04/09/19  1237      K 3.8      CO2 25   *   BUN 22   CREATININE 1.1   CALCIUM 10.6*   PROT 7.6   ALBUMIN 3.5   BILITOT 0.3   ALKPHOS 141*   AST 32   ALT 27   ANIONGAP 10   ESTGFRAFRICA 60   EGFRNONAA 52*   , CBC   Recent Labs   Lab 04/09/19  1237   WBC 10.13   HGB 11.3*   HCT 35.1*      , INR   Recent Labs   Lab 04/09/19  1237   INR 1.0   , Lipid Panel No results for input(s): CHOL, HDL, LDLCALC, TRIG, CHOLHDL in the last 48 hours. and Troponin   Recent Labs   Lab 04/09/19  1237   TROPONINI 0.046*       Significant Imaging: Echocardiogram:   Transthoracic echo (TTE) complete (Cupid Only):   Results for orders placed or performed during the hospital encounter of 03/29/19   Transthoracic echo (TTE) 2D with Color Flow   Result Value Ref Range    Ascending aorta 2.60 cm    STJ 2.12 cm    AV mean gradient 8.80 mmHg    Ao peak mike 1.88 m/s    Ao VTI 44.27 cm    IVRT 0.09 msec    IVS 1.36 (A) 0.6 - 1.1 cm    LA size 4.41 cm    Left Atrium Major Axis 6.02 cm    Left Atrium Minor Axis 5.91 cm    LVIDD 4.14 3.5 - 6.0 cm    LVIDS 2.92 2.1 - 4.0 cm    LVOT diameter 1.81 cm    LVOT peak VTI 26.26 cm    PW 1.50 (A) 0.6 - 1.1 cm    MV Peak A Mike 1.43 m/s    E wave decelartion time 188.11 msec    MV Peak E Mike 1.63 m/s    PV Peak D Mike 0.60 m/s    PV Peak S Mike 0.76 m/s    RA Major Axis 6.16 cm    RA Width 4.47 cm    RVDD 3.78 cm    Sinus 2.63 cm    TAPSE 2.03 cm    TR Max Mike 3.50 m/s    TDI LATERAL 0.10     TDI SEPTAL 0.05     LA WIDTH 4.65 cm    Ao root annulus 2.58 cm    AORTIC VALVE CUSP SEPERATION 1.81 cm    PV PEAK VELOCITY 1.10 cm/s    LV Diastolic Volume 76.09 mL    LV Systolic Volume 32.86 mL    RV S' 12.17 m/s    LVOT peak mike [id number] m/s    LV LATERAL E/E' RATIO 16.30     LV SEPTAL E/E' RATIO  32.60     FS 29 %    LA volume 103.96 cm3    LV mass 226.94 g    Left Ventricle Relative Wall Thickness 0.72 cm    AV valve area 1.53 cm2    AV Velocity Ratio 0.56     AV index (prosthetic) 0.59     E/A ratio 1.14     Mean e' 0.08     Pulm vein S/D ratio 1.27     LVOT area 2.57 cm2    LVOT stroke volume 67.53 cm3    AV peak gradient 14.14 mmHg    E/E' ratio 21.73     LV Systolic Volume Index 15.5 mL/m2    LV Diastolic Volume Index 35.86 mL/m2    LA Volume Index 49.0 mL/m2    LV Mass Index 107.0 g/m2    Triscuspid Valve Regurgitation Peak Gradient 49.00 mmHg    BSA 2.17 m2    Right Atrial Pressure (from IVC) 3 mmHg    TV rest pulmonary artery pressure 52 mmHg     · Normal left ventricular systolic function. The estimated ejection fraction is 55%  · Concentric left ventricular hypertrophy.  · Grade II (moderate) left ventricular diastolic dysfunction consistent with pseudonormalization.  · Elevated left atrial pressure.  · Severe left atrial enlargement.  · The estimated PA systolic pressure is 52 mm Hg  · Pulmonary hypertension present.     [facility]: 3-19  · Three vessel coronary artery disease.  · Successful PCI for acute myocardial infarction of culprit RCA.  · Prox RCA lesion , 99% stenosed reduced to 0%..  · A STENT RESOLUTE CRISSY [id number] stent was successfully placed at 14 ROGER  · Mid RCA lesion , 95% stenosed reduced to 0%..  · A STENT RESOLUTE CRISSY 3.0X12MM stent was successfully placed at 12 ROGER  · Prox Cx to Dist Cx lesion , 95% stenosed.  · Ost 1st Mrg to 1st Mrg lesion , 90% stenosed.  · Diffusely diseased LAD which is small vessel as well    Assessment and Plan:     * Chest pain  Somewhat atypical symptoms but she says similar in presentation to MI recently  Rule out for ACS  Plan for ischemic workup in a.m. likely with NST if rules out      Coronary artery disease involving native coronary artery of native heart  Status post recent PCI to the RCA  Known diffuse left circumflex/OM and mid LAD disease which is  somewhat small vessel  Plan was for risk stratification with NST  Continue medicines for secondary prevention    Hypertension associated with diabetes  Stable    Hyperlipidemia associated with type 2 diabetes mellitus  On statin    Uncontrolled type 2 diabetes mellitus with diabetic polyneuropathy, with long-term current use of insulin  Per IM        VTE Risk Mitigation (From admission, onward)        Ordered     heparin (porcine) injection 5,000 Units  Every 8 hours      04/09/19 1530     IP VTE HIGH RISK PATIENT  Once      04/09/19 1530     Place sequential compression device  Until discontinued      04/09/19 1530     Place BORIS hose  Until discontinued      04/09/19 1530          Thank you for your consult. I will follow-up with patient. Please contact us if you have any additional questions.    Bladimir Barbosa MD  Cardiology   [facility]

## 2019-04-09 NOTE — ED NOTES
Nuclear medicine states they have ordered the medication for the V/Q scan and will be ETA 2.5 hours. MD aware.

## 2019-04-09 NOTE — ED PROVIDER NOTES
Encounter Date: 4/9/2019  SORT: This is a 68 y.o. female with recent admission for anterior inferior infarct who presents for emergent consideration of left chest pain which began at 10 am; she also reports tinging of her entire body. Patient will be moved to a room when one is available for further evaluation and treatment. Orders placed.  SABRINA Morales PA-C     SCRIBE #1 NOTE: IBeck, nicola scribing for, and in the presence of,  Raheem Reza MD. I have scribed the following portions of the note - Other sections scribed: HPI and ROS.       History     Chief Complaint   Patient presents with    Tingling     tingling to the entire body from head to toe, weakness, midsternal CP begining at approx 1000    Chest Pain     CC: Paresthesias    HPI: Patient is a 68-year-old female PMHx DM, HTN, MI and PSHx heart catheterization anxiety who presents with sudden onset full-body paresthesias that began at approximately 10:00 this morning while she was sitting down. There is associated facial numbness localized to her bilateral cheeks and lips. She also complains of constant midsternal chest pain that began en route to the hospital. Her diet is normal. Her bowel movements are normal. Patient expresses concern because she had paresthesias in her arm and back prior to having her last heart attack. She denies any new soaps or shampoos. She denies myalgias.     The history is provided by the patient. No  was used.     Review of patient's allergies indicates:   Allergen Reactions    Novolin 70/30 (semi-synthetic) Nausea And Vomiting     Severe vomiting on Relion 70/30    Shellfish containing products Swelling    Victoza [liraglutide] Nausea And Vomiting    Glipizide Nausea Only    Asa [aspirin]     Citric acid     Codeine     Egg derived     Iodine and iodide containing products     Rituxan [rituximab]     Soy     Sulfa (sulfonamide antibiotics)     Talwin [pentazocine lactate]      Zoloft [sertraline]      Past Medical History:   Diagnosis Date    Anxiety     Diabetes mellitus, type II     Follicular lymphoma     HTN (hypertension)     Restless leg syndrome      Past Surgical History:   Procedure Laterality Date    COLONOSCOPY  11/07/2012    Colon polyp found; repeat in 5 years    ELBOW SURGERY      ESOPHAGOGASTRODUODENOSCOPY  11/07/2012    atrophic gastritis, H pylori testing negative    KNEE SURGERY      Left heart cath Left 3/29/2019    Performed by [facility] at [facility] CATH LAB    Percutaneous coronary intervention N/A 3/29/2019    Performed by Bladimir Barbosa MD at [facility] CATH LAB     Family History   Problem Relation Age of Onset    Cancer Mother     Heart disease Mother     Cancer Father         lung    Diabetes Sister     Hypertension Sister     Stroke Neg Hx     Hyperlipidemia Neg Hx      Social History     Tobacco Use    Smoking status: Never Smoker    Smokeless tobacco: Never Used   Substance Use Topics    Alcohol use: No    Drug use: No     Review of Systems   Respiratory: Negative for cough and shortness of breath.    Cardiovascular: Positive for chest pain (midsternal).   Gastrointestinal: Negative for abdominal pain, diarrhea, nausea and vomiting.   Musculoskeletal: Negative for myalgias.   Neurological: Positive for numbness (facial).        (+ ) paresthesias   All other systems reviewed and are negative.      Physical Exam     Initial Vitals [04/09/19 1227]   BP Pulse Resp Temp SpO2   (!) 177/72 100 20 99.2 °F (37.3 °C) 100 %      MAP       --         Physical Exam    Nursing note and vitals reviewed.  Constitutional: She is not diaphoretic. No distress.   She is anxious-appearing.   HENT:   Head: Normocephalic and atraumatic.   Mouth/Throat: Oropharynx is clear and moist.   Eyes: EOM are normal. Pupils are equal, round, and reactive to light. No scleral icterus.   Neck: Normal range of motion. Neck supple. No JVD present.   Cardiovascular: Normal  rate and regular rhythm.   Pulmonary/Chest: Breath sounds normal. No stridor. No respiratory distress.   Abdominal: Soft. Bowel sounds are normal. She exhibits no distension. There is no tenderness.   Musculoskeletal: Normal range of motion. She exhibits no edema or tenderness.   Neurological: She is alert and oriented to person, place, and time. She has normal strength. No cranial nerve deficit or sensory deficit.   Skin: Skin is warm and dry.   Psychiatric: She has a normal mood and affect.         ED Course   Procedures  Labs Reviewed   CBC W/ AUTO DIFFERENTIAL - Abnormal; Notable for the following components:       Result Value    Hemoglobin 11.3 (*)     Hematocrit 35.1 (*)     All other components within normal limits   COMPREHENSIVE METABOLIC PANEL - Abnormal; Notable for the following components:    Glucose 165 (*)     Calcium 10.6 (*)     Alkaline Phosphatase 141 (*)     eGFR if non  52 (*)     All other components within normal limits   TROPONIN I - Abnormal; Notable for the following components:    Troponin I 0.046 (*)     All other components within normal limits   D DIMER, QUANTITATIVE - Abnormal; Notable for the following components:    D-Dimer 0.71 (*)     All other components within normal limits   MAGNESIUM - Abnormal; Notable for the following components:    Magnesium 1.4 (*)     All other components within normal limits   URINALYSIS, REFLEX TO URINE CULTURE - Abnormal; Notable for the following components:    Protein, UA 1+ (*)     All other components within normal limits    Narrative:     Preferred Collection Type->Urine, Clean Catch   TSH - Abnormal; Notable for the following components:    TSH 0.371 (*)     All other components within normal limits   APTT   PROTIME-INR   PHOSPHORUS   T4, FREE   URINALYSIS MICROSCOPIC    Narrative:     Preferred Collection Type->Urine, Clean Catch     EKG Readings: (Independently Interpreted)   EKG done at 12:23 p.m. showed sinus tachycardia with  rate of 183.  LVH.  Flipped T-waves inferiorly.  No ST elevation.  Normal axis.  Compared to previous and similar     ECG Results          EKG 12-lead (In process)  Result time 04/09/19 13:44:23    In process by Interface, Lab In [facility] (04/09/19 13:44:23)                 Narrative:    Test Reason : R07.9,    Vent. Rate : 103 BPM     Atrial Rate : 103 BPM     P-R Int : 124 ms          QRS Dur : 080 ms      QT Int : 370 ms       P-R-T Axes : 012 -12 -32 degrees     QTc Int : 484 ms    Sinus tachycardia  LVH with repolarization abnormality  Inferior infarct (cited on or before 29-MAR-2019)  Abnormal ECG  When compared with ECG of 30-MAR-2019 14:57,  Significant changes have occurred    Referred By: [email or web address]   SELF           Confirmed By:                   In process by Interface, Lab In [facility] (04/09/19 13:36:54)                 Narrative:    Test Reason : R07.9,    Vent. Rate : 103 BPM     Atrial Rate : 103 BPM     P-R Int : 124 ms          QRS Dur : 080 ms      QT Int : 370 ms       P-R-T Axes : 012 -12 -32 degrees     QTc Int : 484 ms    Sinus tachycardia  LVH with repolarization abnormality  Inferior infarct ,age undetermined  Abnormal ECG      Referred By: [email or web address]   SELF           Confirmed By:                             Imaging Results          X-Ray Chest AP Portable (Final result)  Result time 04/09/19 13:15:54    Final result by Muriel Thomas MD (04/09/19 13:15:54)                 Impression:      No acute intrathoracic process seen      Electronically signed by: Muriel Thomsa MD  Date:    04/09/2019  Time:    13:15             Narrative:    EXAMINATION:  XR CHEST AP PORTABLE    CLINICAL HISTORY:  chest pain;    TECHNIQUE:  Single frontal view of the chest was performed.    COMPARISON:  03/29/2019    FINDINGS:  In EKG wires overlie the chest.  The cardiac silhouette is midline, normal in size.  The lungs are clear.  No focal area of airspace consolidation.  No pleural effusion.  No  pneumothorax.  There is atherosclerotic changes of the aorta.                                 Medical Decision Making:   Initial Assessment:   68 year old patient with hx of mi, dm, htn presenting secondary to chest pain and full body paresthesias. Patient is at higher risk of ACS due to risk factors and HPI with a heart score of 4. Troponins, Cxr, ekg, and labs ordered which showed a slightly elevated troponin but lower than her baseline and elevated D-dimer.  V/Q scan ordered. Patient received nitropaste  [email or web address]    Also considered but less likely:     PE:  D-dimer ordered.  Pending V/Q scan  Pneumonia: chest xray negative. No fever or leukoscytosis. No cough and lungs non consistent with pna  Tamponade: unlikely due to chest xray and ekg  STEMI: No STEMI on ekg  Dissection: equal pulses bilaterally and no ripping chest pain to the back  Esophageal rupture: no dysphagia or vomiting and chest xray negative for mediastinal air  Arrhythmia: no arrhythmia on ekg  CHF: no fluid overload on Cxr and physical exam  Pneumothorax: bilateral breath sounds and no signs of pneumothorax on chest xray     Discussed patient with Dr Dr. Barbosa who recommends observation and possible stress test in AM and patient was admitted to Dr Pham (Discussed with Rey). Patient understands and agrees with plan.  All questions answered.  Patient also feels better after interventions done in the emergency department.    Clinical Tests:   Lab Tests: Ordered and Reviewed  Radiological Study: Reviewed and Ordered  Medical Tests: Ordered and Reviewed            Scribe Attestation:   Scribe #1: I performed the above scribed service and the documentation accurately describes the services I performed. I attest to the accuracy of the note.    Attending Attestation:           Physician Attestation for Scribe:  Physician Attestation Statement for Scribe #1: I, Raheem Reza MD, reviewed  documentation, as scribed by Beck Manrique in my presence, and it is both accurate and complete.                    Clinical Impression:       ICD-10-CM ICD-9-CM   1. Chest pain R07.9 786.50                                Raheem Reza MD  04/09/19 1508

## 2019-04-10 VITALS
DIASTOLIC BLOOD PRESSURE: 68 MMHG | WEIGHT: 199 LBS | BODY MASS INDEX: 29.47 KG/M2 | HEIGHT: 69 IN | TEMPERATURE: 99 F | RESPIRATION RATE: 18 BRPM | SYSTOLIC BLOOD PRESSURE: 152 MMHG | HEART RATE: 94 BPM | OXYGEN SATURATION: 97 %

## 2019-04-10 DIAGNOSIS — I25.10 CORONARY ARTERY DISEASE INVOLVING NATIVE CORONARY ARTERY, ANGINA PRESENCE UNSPECIFIED, UNSPECIFIED WHETHER NATIVE OR TRANSPLANTED HEART: Primary | ICD-10-CM

## 2019-04-10 LAB
ALBUMIN SERPL BCP-MCNC: 3.2 G/DL (ref 3.5–5.2)
ALP SERPL-CCNC: 120 U/L (ref 55–135)
ALT SERPL W/O P-5'-P-CCNC: 24 U/L (ref 10–44)
ANION GAP SERPL CALC-SCNC: 8 MMOL/L (ref 8–16)
AST SERPL-CCNC: 29 U/L (ref 10–40)
BASOPHILS # BLD AUTO: 0.06 K/UL (ref 0–0.2)
BASOPHILS NFR BLD: 0.7 % (ref 0–1.9)
BILIRUB SERPL-MCNC: 0.4 MG/DL (ref 0.1–1)
BUN SERPL-MCNC: 16 MG/DL (ref 8–23)
CALCIUM SERPL-MCNC: 9.9 MG/DL (ref 8.7–10.5)
CHLORIDE SERPL-SCNC: 104 MMOL/L (ref 95–110)
CO2 SERPL-SCNC: 28 MMOL/L (ref 23–29)
CREAT SERPL-MCNC: 1 MG/DL (ref 0.5–1.4)
DIFFERENTIAL METHOD: ABNORMAL
EOSINOPHIL # BLD AUTO: 0.2 K/UL (ref 0–0.5)
EOSINOPHIL NFR BLD: 1.8 % (ref 0–8)
ERYTHROCYTE [DISTWIDTH] IN BLOOD BY AUTOMATED COUNT: 13.9 % (ref 11.5–14.5)
EST. GFR  (AFRICAN AMERICAN): >60 ML/MIN/1.73 M^2
EST. GFR  (NON AFRICAN AMERICAN): 58 ML/MIN/1.73 M^2
GLUCOSE SERPL-MCNC: 137 MG/DL (ref 70–110)
HCT VFR BLD AUTO: 33 % (ref 37–48.5)
HGB BLD-MCNC: 10.6 G/DL (ref 12–16)
LYMPHOCYTES # BLD AUTO: 2.7 K/UL (ref 1–4.8)
LYMPHOCYTES NFR BLD: 29 % (ref 18–48)
MCH RBC QN AUTO: 27.5 PG (ref 27–31)
MCHC RBC AUTO-ENTMCNC: 32.1 G/DL (ref 32–36)
MCV RBC AUTO: 86 FL (ref 82–98)
MONOCYTES # BLD AUTO: 0.7 K/UL (ref 0.3–1)
MONOCYTES NFR BLD: 7.7 % (ref 4–15)
NEUTROPHILS # BLD AUTO: 5.6 K/UL (ref 1.8–7.7)
NEUTROPHILS NFR BLD: 60.4 % (ref 38–73)
PLATELET # BLD AUTO: 219 K/UL (ref 150–350)
PMV BLD AUTO: 12.1 FL (ref 9.2–12.9)
POCT GLUCOSE: 199 MG/DL (ref 70–110)
POCT GLUCOSE: 226 MG/DL (ref 70–110)
POTASSIUM SERPL-SCNC: 3.9 MMOL/L (ref 3.5–5.1)
PROT SERPL-MCNC: 6.8 G/DL (ref 6–8.4)
RBC # BLD AUTO: 3.86 M/UL (ref 4–5.4)
SODIUM SERPL-SCNC: 140 MMOL/L (ref 136–145)
WBC # BLD AUTO: 9.21 K/UL (ref 3.9–12.7)

## 2019-04-10 PROCEDURE — 96372 THER/PROPH/DIAG INJ SC/IM: CPT | Performed by: EMERGENCY MEDICINE

## 2019-04-10 PROCEDURE — 80053 COMPREHEN METABOLIC PANEL: CPT

## 2019-04-10 PROCEDURE — 85025 COMPLETE CBC W/AUTO DIFF WBC: CPT

## 2019-04-10 PROCEDURE — 36415 COLL VENOUS BLD VENIPUNCTURE: CPT

## 2019-04-10 PROCEDURE — 25000003 PHARM REV CODE 250: Performed by: NURSE PRACTITIONER

## 2019-04-10 PROCEDURE — 99214 PR OFFICE/OUTPT VISIT, EST, LEVL IV, 30-39 MIN: ICD-10-PCS | Mod: ,,, | Performed by: INTERNAL MEDICINE

## 2019-04-10 PROCEDURE — G0378 HOSPITAL OBSERVATION PER HR: HCPCS

## 2019-04-10 PROCEDURE — 25000003 PHARM REV CODE 250: Performed by: PHYSICIAN ASSISTANT

## 2019-04-10 PROCEDURE — 63600175 PHARM REV CODE 636 W HCPCS: Performed by: EMERGENCY MEDICINE

## 2019-04-10 PROCEDURE — 99214 OFFICE O/P EST MOD 30 MIN: CPT | Mod: ,,, | Performed by: INTERNAL MEDICINE

## 2019-04-10 RX ORDER — HYDROXYZINE HYDROCHLORIDE 10 MG/1
10 TABLET, FILM COATED ORAL ONCE
Status: COMPLETED | OUTPATIENT
Start: 2019-04-10 | End: 2019-04-10

## 2019-04-10 RX ORDER — HYDROXYZINE HYDROCHLORIDE 10 MG/1
10 TABLET, FILM COATED ORAL 3 TIMES DAILY PRN
Qty: 29 TABLET | Refills: 0 | Status: ON HOLD | OUTPATIENT
Start: 2019-04-10 | End: 2021-06-01 | Stop reason: CLARIF

## 2019-04-10 RX ADMIN — INSULIN ASPART 20 UNITS: 100 INJECTION, SUSPENSION SUBCUTANEOUS at 10:04

## 2019-04-10 RX ADMIN — HEPARIN SODIUM 5000 UNITS: 5000 INJECTION, SOLUTION INTRAVENOUS; SUBCUTANEOUS at 06:04

## 2019-04-10 RX ADMIN — ACETAMINOPHEN 500 MG: 500 TABLET, FILM COATED ORAL at 02:04

## 2019-04-10 RX ADMIN — AMLODIPINE BESYLATE 10 MG: 5 TABLET ORAL at 08:04

## 2019-04-10 RX ADMIN — METOPROLOL SUCCINATE 25 MG: 25 TABLET, EXTENDED RELEASE ORAL at 08:04

## 2019-04-10 RX ADMIN — TICAGRELOR 90 MG: 90 TABLET ORAL at 08:04

## 2019-04-10 RX ADMIN — FLUOXETINE 20 MG: 20 CAPSULE ORAL at 08:04

## 2019-04-10 RX ADMIN — HYDROCHLOROTHIAZIDE 25 MG: 25 TABLET ORAL at 08:04

## 2019-04-10 RX ADMIN — HYDROXYZINE HYDROCHLORIDE 10 MG: 10 TABLET, FILM COATED ORAL at 10:04

## 2019-04-10 NOTE — PLAN OF CARE
Problem: Adult Inpatient Plan of Care  Goal: Plan of Care Review     04/10/19 0527   Plan of Care Review   Plan of Care Reviewed With patient       Problem: Diabetes Comorbidity  Goal: Blood Glucose Level Within Desired Range    Intervention: Maintain Glycemic Control     04/10/19 0527   Monitor and Manage Ketoacidosis   Glycemic Management blood glucose monitoring;supplemental insulin given         Problem: Pain Acute  Goal: Optimal Pain Control    Intervention: Develop Pain Management Plan     04/10/19 0527   Prevent or Manage Pain   Pain Management Interventions pain management plan reviewed with patient/caregiver     Intervention: Prevent or Manage Pain     04/10/19 0527   Prevent or Manage Pain   Sensory Stimulation Regulation lighting decreased;music/television provided for relaxation   Sleep/Rest Enhancement awakenings minimized     Intervention: Optimize Psychosocial Wellbeing     04/10/19 0527   Prevent and Minimize Fear   Diversional Activities television   Promote Anxiety Reduction   Supportive Measures verbalization of feelings encouraged

## 2019-04-10 NOTE — PLAN OF CARE
04/10/19 1111   Discharge Assessment   Assessment Type Discharge Planning Assessment  (pt with active d/c order prior to completion )

## 2019-04-10 NOTE — NURSING
Discharge instructions given to patient at bedside. Patient verbalized understanding and states willingness to comply. Saline lock removed. Tele monitoring removed.

## 2019-04-10 NOTE — PROGRESS NOTES
TN taught Symptoms and Problems for Chest Pain home care with pt and with teach back:  1. Severe chest pain, 2. Burning/Pain upper back. TN placed education sheet in ShareThis Packet..     Help at home will be from spouse, [place],  assisting in pt's recovery.     TN taught patient about things she is responsible for when discharged TO HELP WITH her RECOVERY:  Particularly on how to Manage her Care At Home:  1. Getting his prescriptions filled.  2. Taking his medications as directed. DO NOT MISS ANY DOSES!  3. Going to his follow-up doctor appointments.   .  Albertina Alvarez, RN, BSN, STN CCM

## 2019-04-10 NOTE — PROGRESS NOTES
[facility]  Cardiology  Progress Note    Patient Name: Lorena Contreras  MRN: [id number]  Admission Date: 4/9/2019  Hospital Length of Stay: 0 days  Code Status: Full Code   Attending Physician: Pio Goel MD   Primary Care Physician: Kenneth Luu MD  Expected Discharge Date: 4/10/2019  Principal Problem:Chest pain    Subjective:     Hospital Course:   4-9:  Admitted with atypical chest pain    Interval History:  She denies any chest pain overnight.  She feels like it may have been secondary to anxiety.  She does have nitroglycerin at home.    Telemetry:  Normal sinus rhythm    Review of Systems   All other systems reviewed and are negative.    Objective:     Vital Signs (Most Recent):  Temp: 98.5 °F (36.9 °C) (04/10/19 0741)  Pulse: 85 (04/10/19 0741)  Resp: 18 (04/10/19 0741)  BP: 132/63 (04/10/19 0741)  SpO2: 97 % (04/10/19 0741) Vital Signs (24h Range):  Temp:  [97.8 °F (36.6 °C)-99.2 °F (37.3 °C)] 98.5 °F (36.9 °C)  Pulse:  [] 85  Resp:  [17-20] 18  SpO2:  [96 %-100 %] 97 %  BP: (132-177)/(60-81) 132/63     Weight: 90.3 kg (199 lb)  Body mass index is 29.39 kg/m².     SpO2: 97 %  O2 Device (Oxygen Therapy): room air    No intake or output data in the 24 hours ending 04/10/19 1033    Lines/Drains/Airways     Peripheral Intravenous Line                 Peripheral IV - Single Lumen 04/09/19 1254 Right Forearm less than 1 day                Physical Exam   Constitutional: She is oriented to person, place, and time. She appears well-developed and well-nourished.   HENT:   Head: Normocephalic and atraumatic.   Eyes: Pupils are equal, round, and reactive to light. Conjunctivae and EOM are normal.   Neck: Normal range of motion. Neck supple. No thyromegaly present.   Cardiovascular: Normal rate and regular rhythm.   No murmur heard.  Pulmonary/Chest: Effort normal and breath sounds normal. No respiratory distress.   Abdominal: Soft. Bowel sounds are normal.   Musculoskeletal: She  exhibits no edema.   Neurological: She is alert and oriented to person, place, and time.   Skin: Skin is warm and dry.   Psychiatric: She has a normal mood and affect. Her behavior is normal.       Significant Labs:   CMP   Recent Labs   Lab 04/09/19  1237 04/10/19  0524    140   K 3.8 3.9    104   CO2 25 28   * 137*   BUN 22 16   CREATININE 1.1 1.0   CALCIUM 10.6* 9.9   PROT 7.6 6.8   ALBUMIN 3.5 3.2*   BILITOT 0.3 0.4   ALKPHOS 141* 120   AST 32 29   ALT 27 24   ANIONGAP 10 8   ESTGFRAFRICA 60 >60   EGFRNONAA 52* 58*   , CBC   Recent Labs   Lab 04/09/19  1237 04/10/19  0524   WBC 10.13 9.21   HGB 11.3* 10.6*   HCT 35.1* 33.0*    219   , INR   Recent Labs   Lab 04/09/19  1237   INR 1.0   , Lipid Panel No results for input(s): CHOL, HDL, LDLCALC, TRIG, CHOLHDL in the last 48 hours. and Troponin   Recent Labs   Lab 04/09/19  1237 04/09/19  1629 04/09/19  2058   TROPONINI 0.046* 0.039* 0.043*       Significant Imaging: Echocardiogram:   Transthoracic echo (TTE) complete (Cupid Only):   Results for orders placed or performed during the hospital encounter of 03/29/19   Transthoracic echo (TTE) 2D with Color Flow   Result Value Ref Range    Ascending aorta 2.60 cm    STJ 2.12 cm    AV mean gradient 8.80 mmHg    Ao peak mike 1.88 m/s    Ao VTI 44.27 cm    IVRT 0.09 msec    IVS 1.36 (A) 0.6 - 1.1 cm    LA size 4.41 cm    Left Atrium Major Axis 6.02 cm    Left Atrium Minor Axis 5.91 cm    LVIDD 4.14 3.5 - 6.0 cm    LVIDS 2.92 2.1 - 4.0 cm    LVOT diameter 1.81 cm    LVOT peak VTI 26.26 cm    PW 1.50 (A) 0.6 - 1.1 cm    MV Peak A Mike 1.43 m/s    E wave decelartion time 188.11 msec    MV Peak E Mike 1.63 m/s    PV Peak D Mike 0.60 m/s    PV Peak S Mike 0.76 m/s    RA Major Axis 6.16 cm    RA Width 4.47 cm    RVDD 3.78 cm    Sinus 2.63 cm    TAPSE 2.03 cm    TR Max Mike 3.50 m/s    TDI LATERAL 0.10     TDI SEPTAL 0.05     LA WIDTH 4.65 cm    Ao root annulus 2.58 cm    AORTIC VALVE CUSP SEPERATION 1.81 cm     PV PEAK VELOCITY 1.10 cm/s    LV Diastolic Volume 76.09 mL    LV Systolic Volume 32.86 mL    RV S' 12.17 m/s    LVOT peak fernando 1.604485175 m/s    LV LATERAL E/E' RATIO 16.30     LV SEPTAL E/E' RATIO 32.60     FS 29 %    LA volume 103.96 cm3    LV mass 226.94 g    Left Ventricle Relative Wall Thickness 0.72 cm    AV valve area 1.53 cm2    AV Velocity Ratio 0.56     AV index (prosthetic) 0.59     E/A ratio 1.14     Mean e' 0.08     Pulm vein S/D ratio 1.27     LVOT area 2.57 cm2    LVOT stroke volume 67.53 cm3    AV peak gradient 14.14 mmHg    E/E' ratio 21.73     LV Systolic Volume Index 15.5 mL/m2    LV Diastolic Volume Index 35.86 mL/m2    LA Volume Index 49.0 mL/m2    LV Mass Index 107.0 g/m2    Triscuspid Valve Regurgitation Peak Gradient 49.00 mmHg    BSA 2.17 m2    Right Atrial Pressure (from IVC) 3 mmHg    TV rest pulmonary artery pressure 52 mmHg     Assessment and Plan:     Brief HPI:  No chest pain overnight.  Somewhat atypical and possibly anxiety driven.  Unable to get stress test today is got V/Q scan yesterday ordered by ED physician.  We will schedule for outpatient tomorrow with known multivessel CAD un revascularized in the left coronary system post PCI of the RCA    * Chest pain  Somewhat atypical symptoms but she says similar in presentation to MI recently  Rule out for ACS  Plan for ischemic workup in a.m. likely with NST if rules out      Coronary artery disease involving native coronary artery of native heart  Status post recent PCI to the RCA  Known diffuse left circumflex/OM and mid LAD disease which is somewhat small vessel  Plan was for risk stratification with NST  Continue medicines for secondary prevention    Hypertension associated with diabetes  Stable    Hyperlipidemia associated with type 2 diabetes mellitus  On statin    Uncontrolled type 2 diabetes mellitus with diabetic polyneuropathy, with long-term current use of insulin  Per IM      Okay for DC from CV standpoint.  Follow up  with me as scheduled    VTE Risk Mitigation (From admission, onward)        Ordered     heparin (porcine) injection 5,000 Units  Every 8 hours      04/09/19 1530     IP VTE HIGH RISK PATIENT  Once      04/09/19 1530     Place sequential compression device  Until discontinued      04/09/19 1530     Place BORIS hose  Until discontinued      04/09/19 1530          Bladimir Barbosa MD  Cardiology  [facility]

## 2019-04-10 NOTE — PLAN OF CARE
"   04/10/19 1114   Final Note   Assessment Type Final Discharge Note   Anticipated Discharge Disposition Home   Hospital Follow Up  Appt(s) scheduled? Yes   Discharge plans and expectations educations in teach back method with documentation complete? Yes   Right Care Referral Info   Post Acute Recommendation No Care   pts nurse Kimberly advised all CM needs have been addressed and can d/c from CM standpoint.    EDUCATION:  provided with educational information on chest pain.  Information reviewed and placed in :My Healthcare Packet" to be brought home  to use as resource after discharge.  Information included:  signs and symptoms to look for and call the doctor if experiencing, and symptoms that may indicate a medical emergency: CALL 911.      All questions answered.  Teach back method used.    Patient stated, " pt to go to scheduled appointments and continue to take medications as prescribed ".        "

## 2019-04-10 NOTE — ASSESSMENT & PLAN NOTE
Patient reports frequently finding BG in the 60's at home. Has stopped taking prandial insulin due to this and decreased dose of basal she takes in morning and night. Will decrease her 70/30 night to 15 units. sliding scale, POCT glucose checks, hypoglyecmic protocol, and diabetic diet.

## 2019-04-10 NOTE — DISCHARGE SUMMARY
[facility] Hospital Medicine  Discharge Summary      Patient Name: Lorena Contreras  MRN: [id number]  Admission Date: 4/9/2019  Hospital Length of Stay: 0 days  Discharge Date and Time:  04/10/2019 10:12 AM  Attending Physician: Pio Goel MD   Discharging Provider: ESCOBAR Jay  Primary Care Provider: Kenneth Luu MD      HPI:   Lorena Contreras 68 y.o. female with DM2, CAD with STEMI on 3/30, and history of lymphoma in remission presents to the hospital with a chief complaint of chest pain/body tingling. She reports she experiencing sternal chest pain that she is unable to quantify and radiated to her neck. It was present for a few hours then resolved on its own. It was also associated with diffuse body tingling which she reports is a similar presentation to her previous MI. She denies any other associated symptoms and reports she is complaint free now. She is compliant with all her home medications.     On 3/29 she was admitted to the hospital for inferior STEMI with stent placement to RCA. Stenosis seen on LAD and and left circumflex.     In the ED EKG without STEMI elevation, troponin 0.04, chest x-ray without acute process, cardiology recommended observation for stress test in AM.     * No surgery found *      Hospital Course:   Lorena Contreras 68 y.o. female placed in observation for management of chest pain. In the ED EKG without STEMI elevation, troponin 0.04, chest x-ray without acute process, cardiology recommended observation for stress test in AM. NST contraindicated during observation 2/2 VQ scan taken within 24 hours. Cardiology and patient agrees to discharge and schedule outpatient NST for the am. Patient is comfortable and complaint free at this time. Trial atarax - generalized anxiety.     Consults:   Consults (From admission, onward)        Status Ordering Provider     Inpatient consult to Cardiology  Once     Provider:  Bladimir Barbosa MD     Completed TREY EDWARDS            Final Active Diagnoses:    Diagnosis Date Noted POA    PRINCIPAL PROBLEM:  Chest pain [R07.9] 04/09/2019 Yes    Coronary artery disease involving native coronary artery of native heart [I25.10] 03/29/2019 Yes    Uncontrolled type 2 diabetes mellitus with diabetic polyneuropathy, with long-term current use of insulin [E11.42, Z79.4, E11.65] 11/08/2016 Not Applicable     Chronic    Diabetic peripheral neuropathy associated with type 2 diabetes mellitus [E11.42] 11/05/2015 Yes     Chronic    Hyperlipidemia associated with type 2 diabetes mellitus [E11.69, E78.5] 07/30/2015 Yes     Chronic    Anxiety [F41.9] 01/24/2014 Yes     Chronic    Hypertension associated with diabetes [E11.59, I10] 01/24/2014 Yes     Chronic      Problems Resolved During this Admission:       Discharged Condition: stable    Disposition: Home or Self Care    Follow Up:  Follow-up Information     Justin Wiggins MD. Call in 1 day.    Specialty:  Psychiatry  Contact information:  [address] [facility] [address]  [address] [place] [address]  [phone]                 Patient Instructions:      Diet Cardiac     Activity as tolerated       Significant Diagnostic Studies: Labs:   CMP   Recent Labs   Lab 04/09/19  1237 04/10/19  0524    140   K 3.8 3.9    104   CO2 25 28   * 137*   BUN 22 16   CREATININE 1.1 1.0   CALCIUM 10.6* 9.9   PROT 7.6 6.8   ALBUMIN 3.5 3.2*   BILITOT 0.3 0.4   ALKPHOS 141* 120   AST 32 29   ALT 27 24   ANIONGAP 10 8   ESTGFRAFRICA 60 >60   EGFRNONAA 52* 58*   , CBC   Recent Labs   Lab 04/09/19  1237 04/10/19  0524   WBC 10.13 9.21   HGB 11.3* 10.6*   HCT 35.1* 33.0*    219   , INR   Lab Results   Component Value Date    INR 1.0 04/09/2019    INR 1.0 03/29/2019    INR 1.0 10/03/2014   , Lipid Panel   Lab Results   Component Value Date    CHOL 201 (H) 12/05/2017    HDL 38 (L) 12/05/2017    LDLCALC 115.2 12/05/2017    TRIG 239 (H) 12/05/2017    CHOLHDL 18.9 (L)  "12/05/2017   , Troponin   Recent Labs   Lab 04/09/19  2058   TROPONINI 0.043*    and A1C:   Recent Labs   Lab 11/26/18  1325 03/28/19  1025   HGBA1C 8.9*  8.9* 9.7*       Pending Diagnostic Studies:     None         Medications:  Reconciled Home Medications:      Medication List      START taking these medications    hydrOXYzine HCl 10 MG Tab  Commonly known as:  ATARAX  Take 1 tablet (10 mg total) by mouth 3 (three) times daily as needed.        CHANGE how you take these medications    insulin aspart protamine-insulin aspart 100 unit/mL (70-30) Inpn pen  Commonly known as:  NovoLOG Mix 70-30FlexPen U-100  Inject 30 Units into the skin 2 (two) times daily before meals.  What changed:  Another medication with the same name was removed. Continue taking this medication, and follow the directions you see here.     tolnaftate 1 % cream  Commonly known as:  TINACTIN  Apply topically 2 (two) times daily.  What changed:    · when to take this  · reasons to take this        CONTINUE taking these medications    amLODIPine 10 MG tablet  Commonly known as:  NORVASC  TAKE ONE TABLET BY MOUTH ONCE DAILY     atorvastatin 80 MG tablet  Commonly known as:  LIPITOR  Take 1 tablet (80 mg total) by mouth every evening.     bismuth tribrom-petrolatum,wh 5 X 9 " Bndg  Commonly known as:  XEROFORM  Apply dressing to wound daily     esomeprazole 20 MG capsule  Commonly known as:  NEXIUM  Take 20 mg by mouth before breakfast.     FLUoxetine 20 MG capsule  Take 1 capsule (20 mg total) by mouth once daily.     FREESTYLE TEST Strp  Generic drug:  blood sugar diagnostic  1 strip by Misc.(Non-Drug; Combo Route) route 4 (four) times daily.     furosemide 20 MG tablet  Commonly known as:  LASIX  Take 1 tablet (20 mg total) by mouth daily as needed (leg swelling).     hydroCHLOROthiazide 25 MG tablet  Commonly known as:  HYDRODIURIL  TAKE 1 TABLET BY MOUTH ONCE DAILY     lancets 32 gauge Misc  1 lancet by Misc.(Non-Drug; Combo Route) route 4 " "(four) times daily.     magnesium oxide 400 mg (241.3 mg magnesium) tablet  Commonly known as:  MAG-OX  Take 400 mg by mouth once daily.     meclizine 25 mg tablet  Commonly known as:  ANTIVERT  Take 1 tablet (25 mg total) by mouth 3 (three) times daily as needed.     metFORMIN 1000 MG tablet  Commonly known as:  GLUCOPHAGE  TAKE ONE TABLET BY MOUTH TWICE DAILY WITH MEALS     metoprolol succinate 25 MG 24 hr tablet  Commonly known as:  TOPROL-XL  Take 1 tablet (25 mg total) by mouth once daily.     nitroGLYCERIN 0.4 MG SL tablet  Commonly known as:  NITROSTAT  Place 1 tablet (0.4 mg total) under the tongue every 5 (five) minutes as needed for Chest pain.     pantoprazole 40 MG tablet  Commonly known as:  PROTONIX  Take 1 tablet (40 mg total) by mouth once daily.     pen needle, diabetic 32 gauge x 5/32" Ndle  Use with Novolog Mix Flexpens     SITagliptin 100 MG Tab  Commonly known as:  JANUVIA  Take 1 tablet (100 mg total) by mouth once daily.     ticagrelor 90 mg tablet  Commonly known as:  BRILINTA  Take 1 tablet (90 mg total) by mouth 2 (two) times daily.     triamcinolone acetonide 0.1% 0.1 % cream  Commonly known as:  KENALOG  APPLY  CREAM EXTERNALLY TO AFFECTED AREA TWICE DAILY AS NEEDED     VITAMIN D3 2,000 unit Cap  Generic drug:  cholecalciferol (vitamin D3)  Take 2 capsules by mouth 2 (two) times daily.            Indwelling Lines/Drains at time of discharge:   Lines/Drains/Airways          None          Time spent on the discharge of patient: 30 minutes  Patient was seen and examined on the date of discharge and determined to be suitable for discharge.         JAYCEE Pearson, FNP-C  Hospitalist - Department of Hospital Medicine  [address], [place] [address]  Office [phone]; Pager [phone]    "

## 2019-04-10 NOTE — NURSING
1900: Report received from FRANCIS Harris. Awaiting patients arrival to the unit from scheduled testing.     1920: Patient arrived to the unit via wheelchair from scheduled testing with cardiac monitoring in progress. Vital signs stable and found in flow sheets with complete patient assessment. Skin dry and intact with a 20g RFA PIV noted saline locked. No complaints of pain and no signs of respiratory distress noted. Plan to trend troponin's, monitor and manage pain, and maintain npo status after midnight in preparation for scheduled stress test in the am. Call light in reach and patient instructed to inform the nurse if anything is needed. Patient stable and will continue to be monitored.

## 2019-04-10 NOTE — PROGRESS NOTES
WRITTEN HEALTHCARE DISCHARGE INFORMATION      Things that YOU are responsible for to Manage Your Care At Home:  1. Getting your prescriptions filled.  2. Taking you medications as directed. DO NOT MISS ANY DOSES!  3. Going to your follow-up doctor appointments. This is important because it allows the doctor to monitor your progress and to determine if any changes need to be made to your treatment plan.     If you are unable to make your follow up appointments, please call the number listed and reschedule this appointment.      After discharge, if you need assistance, you can call [facility] On Call Nurse Care Line for 24/7 assistance at [phone]     If you are experience any signs or symptoms, Call your doctor or Call 911 and come to your nearest Emergency Room.     Thank you for choosing [facility] and allowing us to care for you.        You should receive a call from [facility] within 48-72 hours to help manage your care after discharge. Please try to make sure that you answer your phone for this important phone call.     Follow-up Information     Justin Wiggins MD. Call in 1 day.    Specialty:  Psychiatry  Contact information:  [address] [facility] [address]  [address]  [facility] [address]  [phone]             Bladimir Barbosa MD On 4/22/2019.    Specialties:  INTERVENTIONAL CARDIOLOGY, Cardiology  Why:  Appointment scheduled for Monday April 22nd at 8:20am  Contact information:  [address] [facility]  [address]  [facility] [address]  [phone]

## 2019-04-10 NOTE — SUBJECTIVE & OBJECTIVE
Interval History:  She denies any chest pain overnight.  She feels like it may have been secondary to anxiety.  She does have nitroglycerin at home.    Telemetry:  Normal sinus rhythm    Review of Systems   All other systems reviewed and are negative.    Objective:     Vital Signs (Most Recent):  Temp: 98.5 °F (36.9 °C) (04/10/19 0741)  Pulse: 85 (04/10/19 0741)  Resp: 18 (04/10/19 0741)  BP: 132/63 (04/10/19 0741)  SpO2: 97 % (04/10/19 0741) Vital Signs (24h Range):  Temp:  [97.8 °F (36.6 °C)-99.2 °F (37.3 °C)] 98.5 °F (36.9 °C)  Pulse:  [] 85  Resp:  [17-20] 18  SpO2:  [96 %-100 %] 97 %  BP: (132-177)/(60-81) 132/63     Weight: 90.3 kg (199 lb)  Body mass index is 29.39 kg/m².     SpO2: 97 %  O2 Device (Oxygen Therapy): room air    No intake or output data in the 24 hours ending 04/10/19 1033    Lines/Drains/Airways     Peripheral Intravenous Line                 Peripheral IV - Single Lumen 04/09/19 1254 Right Forearm less than 1 day                Physical Exam   Constitutional: She is oriented to person, place, and time. She appears well-developed and well-nourished.   HENT:   Head: Normocephalic and atraumatic.   Eyes: Pupils are equal, round, and reactive to light. Conjunctivae and EOM are normal.   Neck: Normal range of motion. Neck supple. No thyromegaly present.   Cardiovascular: Normal rate and regular rhythm.   No murmur heard.  Pulmonary/Chest: Effort normal and breath sounds normal. No respiratory distress.   Abdominal: Soft. Bowel sounds are normal.   Musculoskeletal: She exhibits no edema.   Neurological: She is alert and oriented to person, place, and time.   Skin: Skin is warm and dry.   Psychiatric: She has a normal mood and affect. Her behavior is normal.       Significant Labs:   CMP   Recent Labs   Lab 04/09/19  1237 04/10/19  0524    140   K 3.8 3.9    104   CO2 25 28   * 137*   BUN 22 16   CREATININE 1.1 1.0   CALCIUM 10.6* 9.9   PROT 7.6 6.8   ALBUMIN 3.5 3.2*    BILITOT 0.3 0.4   ALKPHOS 141* 120   AST 32 29   ALT 27 24   ANIONGAP 10 8   ESTGFRAFRICA 60 >60   EGFRNONAA 52* 58*   , CBC   Recent Labs   Lab 04/09/19  1237 04/10/19  0524   WBC 10.13 9.21   HGB 11.3* 10.6*   HCT 35.1* 33.0*    219   , INR   Recent Labs   Lab 04/09/19  1237   INR 1.0   , Lipid Panel No results for input(s): CHOL, HDL, LDLCALC, TRIG, CHOLHDL in the last 48 hours. and Troponin   Recent Labs   Lab 04/09/19  1237 04/09/19  1629 04/09/19  2058   TROPONINI 0.046* 0.039* 0.043*       Significant Imaging: Echocardiogram:   Transthoracic echo (TTE) complete (Cupid Only):   Results for orders placed or performed during the hospital encounter of 03/29/19   Transthoracic echo (TTE) 2D with Color Flow   Result Value Ref Range    Ascending aorta 2.60 cm    STJ 2.12 cm    AV mean gradient 8.80 mmHg    Ao peak mike 1.88 m/s    Ao VTI 44.27 cm    IVRT 0.09 msec    IVS 1.36 (A) 0.6 - 1.1 cm    LA size 4.41 cm    Left Atrium Major Axis 6.02 cm    Left Atrium Minor Axis 5.91 cm    LVIDD 4.14 3.5 - 6.0 cm    LVIDS 2.92 2.1 - 4.0 cm    LVOT diameter 1.81 cm    LVOT peak VTI 26.26 cm    PW 1.50 (A) 0.6 - 1.1 cm    MV Peak A Mike 1.43 m/s    E wave decelartion time 188.11 msec    MV Peak E Mike 1.63 m/s    PV Peak D Mike 0.60 m/s    PV Peak S Mike 0.76 m/s    RA Major Axis 6.16 cm    RA Width 4.47 cm    RVDD 3.78 cm    Sinus 2.63 cm    TAPSE 2.03 cm    TR Max Mike 3.50 m/s    TDI LATERAL 0.10     TDI SEPTAL 0.05     LA WIDTH 4.65 cm    Ao root annulus 2.58 cm    AORTIC VALVE CUSP SEPERATION 1.81 cm    PV PEAK VELOCITY 1.10 cm/s    LV Diastolic Volume 76.09 mL    LV Systolic Volume 32.86 mL    RV S' 12.17 m/s    LVOT peak mike 1.196732514 m/s    LV LATERAL E/E' RATIO 16.30     LV SEPTAL E/E' RATIO 32.60     FS 29 %    LA volume 103.96 cm3    LV mass 226.94 g    Left Ventricle Relative Wall Thickness 0.72 cm    AV valve area 1.53 cm2    AV Velocity Ratio 0.56     AV index (prosthetic) 0.59     E/A ratio 1.14     Mean e'  0.08     Pulm vein S/D ratio 1.27     LVOT area 2.57 cm2    LVOT stroke volume 67.53 cm3    AV peak gradient 14.14 mmHg    E/E' ratio 21.73     LV Systolic Volume Index 15.5 mL/m2    LV Diastolic Volume Index 35.86 mL/m2    LA Volume Index 49.0 mL/m2    LV Mass Index 107.0 g/m2    Triscuspid Valve Regurgitation Peak Gradient 49.00 mmHg    BSA 2.17 m2    Right Atrial Pressure (from IVC) 3 mmHg    TV rest pulmonary artery pressure 52 mmHg

## 2019-04-11 ENCOUNTER — HOSPITAL ENCOUNTER (OUTPATIENT)
Dept: CARDIOLOGY | Facility: HOSPITAL | Age: 69
Discharge: HOME | End: 2019-04-11
Attending: INTERNAL MEDICINE
Payer: MEDICARE

## 2019-04-11 ENCOUNTER — HOSPITAL ENCOUNTER (OUTPATIENT)
Dept: RADIOLOGY | Facility: HOSPITAL | Age: 69
Discharge: HOME | End: 2019-04-11
Attending: INTERNAL MEDICINE
Payer: MEDICARE

## 2019-04-11 DIAGNOSIS — I25.10 CORONARY ARTERY DISEASE INVOLVING NATIVE CORONARY ARTERY, ANGINA PRESENCE UNSPECIFIED, UNSPECIFIED WHETHER NATIVE OR TRANSPLANTED HEART: ICD-10-CM

## 2019-04-11 LAB
CV STRESS BASE HR: 97 BPM
DIASTOLIC BLOOD PRESSURE: 71 MMHG
NUC STRESS EJECTION FRACTION: 68 %
OHS CV CPX 85 PERCENT MAX PREDICTED HEART RATE MALE: 124
OHS CV CPX MAX PREDICTED HEART RATE: 146
OHS CV CPX PATIENT IS FEMALE: 1
OHS CV CPX PATIENT IS MALE: 0
OHS CV CPX PEAK DIASTOLIC BLOOD PRESSURE: 67 MMHG
OHS CV CPX PEAK HEAR RATE: 123 BPM
OHS CV CPX PEAK RATE PRESSURE PRODUCT: NORMAL
OHS CV CPX PEAK SYSTOLIC BLOOD PRESSURE: 148 MMHG
OHS CV CPX PERCENT MAX PREDICTED HEART RATE ACHIEVED: 84
OHS CV CPX RATE PRESSURE PRODUCT PRESENTING: NORMAL
SYSTOLIC BLOOD PRESSURE: 143 MMHG

## 2019-04-11 PROCEDURE — 93017 CV STRESS TEST TRACING ONLY: CPT

## 2019-04-11 PROCEDURE — A9502 TC99M TETROFOSMIN: HCPCS

## 2019-04-11 PROCEDURE — 78452 STRESS TEST WITH MYOCARDIAL PERFUSION (CUPID ONLY): ICD-10-PCS | Mod: 26,,, | Performed by: INTERNAL MEDICINE

## 2019-04-11 PROCEDURE — 93016 CV STRESS TEST SUPVJ ONLY: CPT | Mod: ,,, | Performed by: INTERNAL MEDICINE

## 2019-04-11 PROCEDURE — 63600175 PHARM REV CODE 636 W HCPCS: Performed by: INTERNAL MEDICINE

## 2019-04-11 PROCEDURE — 93018 STRESS TEST WITH MYOCARDIAL PERFUSION (CUPID ONLY): ICD-10-PCS | Mod: ,,, | Performed by: INTERNAL MEDICINE

## 2019-04-11 PROCEDURE — 78452 HT MUSCLE IMAGE SPECT MULT: CPT | Mod: 26,,, | Performed by: INTERNAL MEDICINE

## 2019-04-11 PROCEDURE — 93018 CV STRESS TEST I&R ONLY: CPT | Mod: ,,, | Performed by: INTERNAL MEDICINE

## 2019-04-11 PROCEDURE — 93016 STRESS TEST WITH MYOCARDIAL PERFUSION (CUPID ONLY): ICD-10-PCS | Mod: ,,, | Performed by: INTERNAL MEDICINE

## 2019-04-11 RX ORDER — REGADENOSON 0.08 MG/ML
0.4 INJECTION, SOLUTION INTRAVENOUS ONCE
Status: COMPLETED | OUTPATIENT
Start: 2019-04-11 | End: 2019-04-11

## 2019-04-11 RX ADMIN — REGADENOSON 0.4 MG: 0.08 INJECTION, SOLUTION INTRAVENOUS at 08:04

## 2019-04-22 ENCOUNTER — OFFICE VISIT (OUTPATIENT)
Dept: CARDIOLOGY | Facility: CLINIC | Age: 69
End: 2019-04-22
Payer: MEDICARE

## 2019-04-22 VITALS
SYSTOLIC BLOOD PRESSURE: 152 MMHG | RESPIRATION RATE: 16 BRPM | BODY MASS INDEX: 29.3 KG/M2 | WEIGHT: 198.44 LBS | DIASTOLIC BLOOD PRESSURE: 88 MMHG | HEART RATE: 100 BPM | OXYGEN SATURATION: 96 %

## 2019-04-22 DIAGNOSIS — Z85.72 HISTORY OF NON-HODGKIN'S LYMPHOMA: ICD-10-CM

## 2019-04-22 DIAGNOSIS — I21.19 ACUTE INFERIOR MYOCARDIAL INFARCTION: ICD-10-CM

## 2019-04-22 DIAGNOSIS — M79.7 FIBROMYALGIA: ICD-10-CM

## 2019-04-22 DIAGNOSIS — F41.9 ANXIETY: ICD-10-CM

## 2019-04-22 DIAGNOSIS — E11.69 HYPERLIPIDEMIA ASSOCIATED WITH TYPE 2 DIABETES MELLITUS: ICD-10-CM

## 2019-04-22 DIAGNOSIS — I25.10 CORONARY ARTERY DISEASE INVOLVING NATIVE CORONARY ARTERY, ANGINA PRESENCE UNSPECIFIED, UNSPECIFIED WHETHER NATIVE OR TRANSPLANTED HEART: Primary | ICD-10-CM

## 2019-04-22 DIAGNOSIS — E78.5 HYPERLIPIDEMIA ASSOCIATED WITH TYPE 2 DIABETES MELLITUS: ICD-10-CM

## 2019-04-22 DIAGNOSIS — E11.59 HYPERTENSION ASSOCIATED WITH DIABETES: ICD-10-CM

## 2019-04-22 DIAGNOSIS — I15.2 HYPERTENSION ASSOCIATED WITH DIABETES: ICD-10-CM

## 2019-04-22 PROCEDURE — 3046F PR MOST RECENT HEMOGLOBIN A1C LEVEL > 9.0%: ICD-10-PCS | Mod: CPTII,S$GLB,, | Performed by: INTERNAL MEDICINE

## 2019-04-22 PROCEDURE — 99214 PR OFFICE/OUTPT VISIT, EST, LEVL IV, 30-39 MIN: ICD-10-PCS | Mod: S$GLB,,, | Performed by: INTERNAL MEDICINE

## 2019-04-22 PROCEDURE — 99214 OFFICE O/P EST MOD 30 MIN: CPT | Mod: S$GLB,,, | Performed by: INTERNAL MEDICINE

## 2019-04-22 PROCEDURE — 3046F HEMOGLOBIN A1C LEVEL >9.0%: CPT | Mod: CPTII,S$GLB,, | Performed by: INTERNAL MEDICINE

## 2019-04-22 PROCEDURE — 1101F PT FALLS ASSESS-DOCD LE1/YR: CPT | Mod: CPTII,S$GLB,, | Performed by: INTERNAL MEDICINE

## 2019-04-22 PROCEDURE — 99999 PR PBB SHADOW E&M-EST. PATIENT-LVL III: CPT | Mod: PBBFAC,,, | Performed by: INTERNAL MEDICINE

## 2019-04-22 PROCEDURE — 3079F DIAST BP 80-89 MM HG: CPT | Mod: CPTII,S$GLB,, | Performed by: INTERNAL MEDICINE

## 2019-04-22 PROCEDURE — 3079F PR MOST RECENT DIASTOLIC BLOOD PRESSURE 80-89 MM HG: ICD-10-PCS | Mod: CPTII,S$GLB,, | Performed by: INTERNAL MEDICINE

## 2019-04-22 PROCEDURE — 3077F SYST BP >= 140 MM HG: CPT | Mod: CPTII,S$GLB,, | Performed by: INTERNAL MEDICINE

## 2019-04-22 PROCEDURE — 99499 UNLISTED E&M SERVICE: CPT | Mod: S$GLB,,, | Performed by: INTERNAL MEDICINE

## 2019-04-22 PROCEDURE — 99999 PR PBB SHADOW E&M-EST. PATIENT-LVL III: ICD-10-PCS | Mod: PBBFAC,,, | Performed by: INTERNAL MEDICINE

## 2019-04-22 PROCEDURE — 3077F PR MOST RECENT SYSTOLIC BLOOD PRESSURE >= 140 MM HG: ICD-10-PCS | Mod: CPTII,S$GLB,, | Performed by: INTERNAL MEDICINE

## 2019-04-22 PROCEDURE — 1101F PR PT FALLS ASSESS DOC 0-1 FALLS W/OUT INJ PAST YR: ICD-10-PCS | Mod: CPTII,S$GLB,, | Performed by: INTERNAL MEDICINE

## 2019-04-22 PROCEDURE — 99499 RISK ADDL DX/OHS AUDIT: ICD-10-PCS | Mod: S$GLB,,, | Performed by: INTERNAL MEDICINE

## 2019-04-22 NOTE — PROGRESS NOTES
Subjective:    Patient ID:  Lorena Contreras is a 68 y.o. female who presents for follow-up of Hospital Follow Up      HPI  Patient is here for follow-up of coronary artery disease and ST-elevation MI.  She underwent PCI to the RCA.  She is known to have other blockages well for which she re-presented to the emergency department was admitted for observation for atypical chest pain.  She felt like it was anxiety related and she no longer has had any since discharge.  She underwent nuclear stress test which showed no significant ischemia.  She denies any other associated symptoms currently.  She has experienced no PND, orthopnea or lower extremity edema.  She denies any dizziness, presyncope or syncope.  She says she was given Atarax on discharge which has helped with her anxiety.    Review of Systems   Constitution: Negative.   HENT: Negative.    Eyes: Negative.    Cardiovascular: Negative for chest pain, dyspnea on exertion, irregular heartbeat, leg swelling, near-syncope, orthopnea, palpitations, paroxysmal nocturnal dyspnea and syncope.   Respiratory: Negative for shortness of breath.    Skin: Negative.    Musculoskeletal: Negative.    Gastrointestinal: Negative for abdominal pain, constipation and diarrhea.   Genitourinary: Negative for dysuria.   Neurological: Negative.    Psychiatric/Behavioral: The patient is nervous/anxious.         Objective:    Physical Exam   Constitutional: She is oriented to person, place, and time. She appears well-developed and well-nourished.   HENT:   Head: Normocephalic and atraumatic.   Eyes: Pupils are equal, round, and reactive to light. Conjunctivae and EOM are normal.   Neck: Normal range of motion. Neck supple. No thyromegaly present.   Cardiovascular: Normal rate and regular rhythm.   No murmur heard.  Pulmonary/Chest: Effort normal and breath sounds normal. No respiratory distress.   Abdominal: Soft. Bowel sounds are normal.   Musculoskeletal: She exhibits no edema.    Neurological: She is alert and oriented to person, place, and time.   Skin: Skin is warm and dry.   Psychiatric: She has a normal mood and affect. Her behavior is normal.       LHC: 3-19  · Three vessel coronary artery disease.  · Successful PCI for acute myocardial infarction of culprit RCA.  · Prox RCA lesion , 99% stenosed reduced to 0%..  · A STENT RESOLUTE CRISSY 3.5X15MM stent was successfully placed at 14 ROGER  · Mid RCA lesion , 95% stenosed reduced to 0%..  · A STENT RESOLUTE CRISSY 3.0X12MM stent was successfully placed at 12 ROGER  · Prox Cx to Dist Cx lesion , 95% stenosed.  · Ost 1st Mrg to 1st Mrg lesion , 90% stenosed.  · Diffusely diseased LAD which is small vessel as well    Echo:  · Normal left ventricular systolic function. The estimated ejection fraction is 55%  · Concentric left ventricular hypertrophy.  · Grade II (moderate) left ventricular diastolic dysfunction consistent with pseudonormalization.  · Elevated left atrial pressure.  · Severe left atrial enlargement.  · The estimated PA systolic pressure is 52 mm Hg  · Pulmonary hypertension present.    NST: 4-19  · Abnormal myocardial perfusion scan  · There were no arrhythmias during stress.  · There was no ST segment deviation noted during stress.  · The patient reported dizziness and nausea during the stress test.  · There is a small amount of mild, infarct defect(s) in the inferior wall(s),In the typical distribution of the RCA territory.  · LVEF post-stress is 68%  · The EKG portion of this study is negative for myocardial ischemia.    Assessment:       1. Coronary artery disease involving native coronary artery, angina presence unspecified, unspecified whether native or transplanted heart    2. Acute inferior myocardial infarction    3. Anxiety    4. Hypertension associated with diabetes    5. History of non-Hodgkin's lymphoma    6. Fibromyalgia    7. Hyperlipidemia associated with type 2 diabetes mellitus    8. Uncontrolled type 2 diabetes  mellitus with diabetic polyneuropathy, with long-term current use of insulin         Plan:       -continue medical therapy  -referred to cardiac rehab  -PAD screen    Return to clinic in 1 month with testing now

## 2019-05-06 ENCOUNTER — HOSPITAL ENCOUNTER (OUTPATIENT)
Dept: CARDIOLOGY | Facility: HOSPITAL | Age: 69
Discharge: HOME | End: 2019-05-06
Attending: INTERNAL MEDICINE
Payer: MEDICARE

## 2019-05-06 DIAGNOSIS — I25.10 CORONARY ARTERY DISEASE INVOLVING NATIVE CORONARY ARTERY, ANGINA PRESENCE UNSPECIFIED, UNSPECIFIED WHETHER NATIVE OR TRANSPLANTED HEART: ICD-10-CM

## 2019-05-06 LAB
LEFT ANT TIBIAL SYS PSV: 141 CM/S
LEFT CBA DIAS: 8 CM/S
LEFT CBA SYS: 136 CM/S
LEFT CCA DIST DIAS: 12 CM/S
LEFT CCA DIST SYS: 100 CM/S
LEFT CCA MID DIAS: 10 CM/S
LEFT CCA MID SYS: 112 CM/S
LEFT CCA PROX DIAS: 6 CM/S
LEFT CCA PROX SYS: 142 CM/S
LEFT CFA PSV: 130 CM/S
LEFT ECA DIAS: 8 CM/S
LEFT ECA SYS: 152 CM/S
LEFT EXTERNAL ILIAC PSV: 119 CM/S
LEFT ICA DIST DIAS: 14 CM/S
LEFT ICA DIST SYS: 60 CM/S
LEFT ICA MID DIAS: 16 CM/S
LEFT ICA MID SYS: 75 CM/S
LEFT ICA PROX DIAS: 8 CM/S
LEFT ICA PROX SYS: 104 CM/S
LEFT PERONEAL SYS PSV: 85 CM/S
LEFT POPLITEAL PSV: 104 CM/S
LEFT POST TIBIAL SYS PSV: 69 CM/S
LEFT PROFUNDA SYS PSV: 111 CM/S
LEFT SUPER FEMORAL DIST SYS PSV: 135 CM/S
LEFT SUPER FEMORAL MID SYS PSV: 166 CM/S
LEFT SUPER FEMORAL OSTIAL SYS PSV: 103 CM/S
LEFT SUPER FEMORAL PROX SYS PSV: 376 CM/S
LEFT TIB/PER TRUNK SYS PSV: 127 CM/S
LEFT VERTEBRAL DIAS: 12 CM/S
LEFT VERTEBRAL SYS: 83 CM/S
OHS CV CAROTID RIGHT ICA EDV HIGHEST: 22
OHS CV CAROTID ULTRASOUND LEFT ICA/CCA RATIO: 0.73
OHS CV CAROTID ULTRASOUND RIGHT ICA/CCA RATIO: 0.75
OHS CV PV CAROTID LEFT HIGHEST CCA: 142
OHS CV PV CAROTID LEFT HIGHEST ICA: 104
OHS CV PV CAROTID RIGHT HIGHEST CCA: 138
OHS CV PV CAROTID RIGHT HIGHEST ICA: 104
OHS CV US CAROTID LEFT HIGHEST EDV: 16
RIGHT ANT TIBIAL SYS PSV: 73 CM/S
RIGHT CBA DIAS: 8 CM/S
RIGHT CBA SYS: 79 CM/S
RIGHT CCA DIST DIAS: 8 CM/S
RIGHT CCA DIST SYS: 89 CM/S
RIGHT CCA MID DIAS: 6 CM/S
RIGHT CCA MID SYS: 97 CM/S
RIGHT CCA PROX DIAS: 12 CM/S
RIGHT CCA PROX SYS: 138 CM/S
RIGHT CFA PSV: 94 CM/S
RIGHT ECA DIAS: 0 CM/S
RIGHT ECA SYS: 150 CM/S
RIGHT EXTERNAL ILLIAC PSV: 100 CM/S
RIGHT ICA DIST DIAS: 22 CM/S
RIGHT ICA DIST SYS: 83 CM/S
RIGHT ICA MID DIAS: 18 CM/S
RIGHT ICA MID SYS: 93 CM/S
RIGHT ICA PROX DIAS: 8 CM/S
RIGHT ICA PROX SYS: 104 CM/S
RIGHT PERONEAL SYS PSV: 71 CM/S
RIGHT POPLITEAL PSV: 72 CM/S
RIGHT POST TIBIAL SYS PSV: 136 CM/S
RIGHT PROFUNDA SYS PSV: 94 CM/S
RIGHT SUPER FEMORAL DIST SYS PSV: 120 CM/S
RIGHT SUPER FEMORAL MID SYS PSV: 140 CM/S
RIGHT SUPER FEMORAL OSTIAL SYS PSV: 230 CM/S
RIGHT SUPER FEMORAL PROX SYS PSV: 257 CM/S
RIGHT TIB/PER TRUNK SYS PSV: 96 CM/S
RIGHT VERTEBRAL DIAS: 8 CM/S
RIGHT VERTEBRAL SYS: 73 CM/S

## 2019-05-06 PROCEDURE — 93880 EXTRACRANIAL BILAT STUDY: CPT | Mod: 50

## 2019-05-06 PROCEDURE — 93925 CV US DOPPLER ARTERIAL LEGS BILATERAL (CUPID ONLY): ICD-10-PCS | Mod: 26,,, | Performed by: INTERNAL MEDICINE

## 2019-05-06 PROCEDURE — 93880 EXTRACRANIAL BILAT STUDY: CPT | Mod: 26,,, | Performed by: INTERNAL MEDICINE

## 2019-05-06 PROCEDURE — 93925 LOWER EXTREMITY STUDY: CPT | Mod: 50

## 2019-05-06 PROCEDURE — 93925 LOWER EXTREMITY STUDY: CPT | Mod: 26,,, | Performed by: INTERNAL MEDICINE

## 2019-05-06 PROCEDURE — 93880 CV US DOPPLER CAROTID (CUPID ONLY): ICD-10-PCS | Mod: 26,,, | Performed by: INTERNAL MEDICINE

## 2019-05-09 ENCOUNTER — HOSPITAL ENCOUNTER (EMERGENCY)
Facility: HOSPITAL | Age: 69
Discharge: HOME | End: 2019-05-09
Attending: EMERGENCY MEDICINE
Payer: MEDICARE

## 2019-05-09 ENCOUNTER — NURSE TRIAGE (OUTPATIENT)
Dept: ADMINISTRATIVE | Facility: CLINIC | Age: 69
End: 2019-05-09

## 2019-05-09 VITALS
TEMPERATURE: 99 F | BODY MASS INDEX: 31.39 KG/M2 | SYSTOLIC BLOOD PRESSURE: 165 MMHG | WEIGHT: 200 LBS | DIASTOLIC BLOOD PRESSURE: 70 MMHG | HEIGHT: 67 IN | OXYGEN SATURATION: 98 % | RESPIRATION RATE: 18 BRPM | HEART RATE: 105 BPM

## 2019-05-09 DIAGNOSIS — E83.42 HYPOMAGNESEMIA: ICD-10-CM

## 2019-05-09 DIAGNOSIS — R00.2 PALPITATIONS: Primary | Chronic | ICD-10-CM

## 2019-05-09 LAB
ALBUMIN SERPL BCP-MCNC: 3.8 G/DL (ref 3.5–5.2)
ALP SERPL-CCNC: 121 U/L (ref 55–135)
ALT SERPL W/O P-5'-P-CCNC: 33 U/L (ref 10–44)
ANION GAP SERPL CALC-SCNC: 14 MMOL/L (ref 8–16)
AST SERPL-CCNC: 38 U/L (ref 10–40)
BASOPHILS # BLD AUTO: 0.05 K/UL (ref 0–0.2)
BASOPHILS NFR BLD: 0.4 % (ref 0–1.9)
BILIRUB SERPL-MCNC: 0.6 MG/DL (ref 0.1–1)
BILIRUB UR QL STRIP: NEGATIVE
BUN SERPL-MCNC: 22 MG/DL (ref 8–23)
CALCIUM SERPL-MCNC: 9.8 MG/DL (ref 8.7–10.5)
CHLORIDE SERPL-SCNC: 104 MMOL/L (ref 95–110)
CLARITY UR: CLEAR
CO2 SERPL-SCNC: 20 MMOL/L (ref 23–29)
COLOR UR: YELLOW
CREAT SERPL-MCNC: 1.2 MG/DL (ref 0.5–1.4)
DIFFERENTIAL METHOD: ABNORMAL
EOSINOPHIL # BLD AUTO: 0.1 K/UL (ref 0–0.5)
EOSINOPHIL NFR BLD: 1.1 % (ref 0–8)
ERYTHROCYTE [DISTWIDTH] IN BLOOD BY AUTOMATED COUNT: 14 % (ref 11.5–14.5)
EST. GFR  (AFRICAN AMERICAN): 54 ML/MIN/1.73 M^2
EST. GFR  (NON AFRICAN AMERICAN): 47 ML/MIN/1.73 M^2
GLUCOSE SERPL-MCNC: 267 MG/DL (ref 70–110)
GLUCOSE UR QL STRIP: NEGATIVE
HCT VFR BLD AUTO: 36.2 % (ref 37–48.5)
HGB BLD-MCNC: 11.6 G/DL (ref 12–16)
HGB UR QL STRIP: ABNORMAL
KETONES UR QL STRIP: NEGATIVE
LACTATE SERPL-SCNC: 0.8 MMOL/L (ref 0.5–2.2)
LACTATE SERPL-SCNC: 2.8 MMOL/L (ref 0.5–2.2)
LEUKOCYTE ESTERASE UR QL STRIP: NEGATIVE
LYMPHOCYTES # BLD AUTO: 1.3 K/UL (ref 1–4.8)
LYMPHOCYTES NFR BLD: 11 % (ref 18–48)
MAGNESIUM SERPL-MCNC: 1.1 MG/DL (ref 1.6–2.6)
MCH RBC QN AUTO: 27.9 PG (ref 27–31)
MCHC RBC AUTO-ENTMCNC: 32 G/DL (ref 32–36)
MCV RBC AUTO: 87 FL (ref 82–98)
MICROSCOPIC COMMENT: NORMAL
MONOCYTES # BLD AUTO: 0.4 K/UL (ref 0.3–1)
MONOCYTES NFR BLD: 3.6 % (ref 4–15)
NEUTROPHILS # BLD AUTO: 10 K/UL (ref 1.8–7.7)
NEUTROPHILS NFR BLD: 83.9 % (ref 38–73)
NITRITE UR QL STRIP: NEGATIVE
PH UR STRIP: 5 [PH] (ref 5–8)
PLATELET # BLD AUTO: 247 K/UL (ref 150–350)
PMV BLD AUTO: 12.9 FL (ref 9.2–12.9)
POTASSIUM SERPL-SCNC: 4 MMOL/L (ref 3.5–5.1)
PROT SERPL-MCNC: 7.6 G/DL (ref 6–8.4)
PROT UR QL STRIP: NEGATIVE
RBC # BLD AUTO: 4.16 M/UL (ref 4–5.4)
RBC #/AREA URNS HPF: 1 /HPF (ref 0–4)
SODIUM SERPL-SCNC: 138 MMOL/L (ref 136–145)
SP GR UR STRIP: 1.01 (ref 1–1.03)
TROPONIN I SERPL DL<=0.01 NG/ML-MCNC: 0.02 NG/ML (ref 0–0.03)
TROPONIN I SERPL DL<=0.01 NG/ML-MCNC: 0.03 NG/ML (ref 0–0.03)
TSH SERPL DL<=0.005 MIU/L-ACNC: 0.63 UIU/ML (ref 0.4–4)
URN SPEC COLLECT METH UR: ABNORMAL
UROBILINOGEN UR STRIP-ACNC: NEGATIVE EU/DL
WBC # BLD AUTO: 11.95 K/UL (ref 3.9–12.7)

## 2019-05-09 PROCEDURE — 96375 TX/PRO/DX INJ NEW DRUG ADDON: CPT

## 2019-05-09 PROCEDURE — 93005 ELECTROCARDIOGRAM TRACING: CPT

## 2019-05-09 PROCEDURE — 25000003 PHARM REV CODE 250: Performed by: EMERGENCY MEDICINE

## 2019-05-09 PROCEDURE — 96365 THER/PROPH/DIAG IV INF INIT: CPT

## 2019-05-09 PROCEDURE — 63600175 PHARM REV CODE 636 W HCPCS: Performed by: EMERGENCY MEDICINE

## 2019-05-09 PROCEDURE — 83735 ASSAY OF MAGNESIUM: CPT

## 2019-05-09 PROCEDURE — 81000 URINALYSIS NONAUTO W/SCOPE: CPT

## 2019-05-09 PROCEDURE — 99285 EMERGENCY DEPT VISIT HI MDM: CPT | Mod: 25

## 2019-05-09 PROCEDURE — 84484 ASSAY OF TROPONIN QUANT: CPT | Mod: 91

## 2019-05-09 PROCEDURE — 83605 ASSAY OF LACTIC ACID: CPT

## 2019-05-09 PROCEDURE — 93010 ELECTROCARDIOGRAM REPORT: CPT | Mod: ,,, | Performed by: INTERNAL MEDICINE

## 2019-05-09 PROCEDURE — 93010 EKG 12-LEAD: ICD-10-PCS | Mod: ,,, | Performed by: INTERNAL MEDICINE

## 2019-05-09 PROCEDURE — 96366 THER/PROPH/DIAG IV INF ADDON: CPT

## 2019-05-09 PROCEDURE — 84443 ASSAY THYROID STIM HORMONE: CPT

## 2019-05-09 PROCEDURE — 83605 ASSAY OF LACTIC ACID: CPT | Mod: 91

## 2019-05-09 PROCEDURE — 96361 HYDRATE IV INFUSION ADD-ON: CPT

## 2019-05-09 PROCEDURE — 84484 ASSAY OF TROPONIN QUANT: CPT

## 2019-05-09 PROCEDURE — 80053 COMPREHEN METABOLIC PANEL: CPT

## 2019-05-09 PROCEDURE — 85025 COMPLETE CBC W/AUTO DIFF WBC: CPT

## 2019-05-09 RX ORDER — LORAZEPAM 2 MG/ML
1 INJECTION INTRAMUSCULAR
Status: COMPLETED | OUTPATIENT
Start: 2019-05-09 | End: 2019-05-09

## 2019-05-09 RX ORDER — MAGNESIUM SULFATE 1 G/100ML
1 INJECTION INTRAVENOUS
Status: COMPLETED | OUTPATIENT
Start: 2019-05-09 | End: 2019-05-09

## 2019-05-09 RX ADMIN — MAGNESIUM SULFATE 1 G: 1 INJECTION INTRAVENOUS at 01:05

## 2019-05-09 RX ADMIN — SODIUM CHLORIDE 1000 ML: 0.9 INJECTION, SOLUTION INTRAVENOUS at 10:05

## 2019-05-09 RX ADMIN — LORAZEPAM 1 MG: 2 INJECTION INTRAMUSCULAR; INTRAVENOUS at 10:05

## 2019-05-09 RX ADMIN — SODIUM CHLORIDE 1000 ML: 0.9 INJECTION, SOLUTION INTRAVENOUS at 12:05

## 2019-05-09 NOTE — TELEPHONE ENCOUNTER
"    Reason for Disposition   Dizziness, lightheadedness, or weakness    Protocols used: HEART RATE AND HEARTBEAT [id number]    Lorena started having rapid heart rate last night, with pain in both arms.  This morning, both arms numb, and has racing heart rate, with pain in the back of her neck.  She states she has no chest pain, but does feel weak. No BP cuff at home to check BP, but says "my heart is pounding very fast."  Acute MI 03/29/19. History of cardiomegaly, MI, DM (this morning CBG is 121).  Per [facility] triage protocol, recommend ED now for evaluation.  Her  is home with her, they live near [facility], and he will bring her there now.  Encouraged her to call 911 if immediate transportation is not available.  Message to Kenneth Luu MD, pcp, and Dr Bladimir Barbosa, her cardiologist.  Please contact caller directly with any additional care advice.    CALL BACK IF:   * Chest pain, lightheadedness, or difficulty breathing occurs  * Heart beating more than 140 beats / minute  * More than 3 extra or skipped beats / minute  * You become worse  "

## 2019-05-09 NOTE — ED TRIAGE NOTES
"Pt arrived to Ed with c/o palpitations x "a few days. Pt states "it feels like my heart is racing." Pt complains of neck pain and tingling in arms. Denies chest pain and SOB. Family at bedside. NAD. Pt connected to monitor.   "

## 2019-05-09 NOTE — DISCHARGE INSTRUCTIONS
Your lab tests showed some low magnesium and your given additional magnesium in the emergency department.  Your troponins have been within normal ranges twice.  Your lactic acid was elevated initially but has normalized after IV hydration.  It is important that you continue your medications as you have been prescribed.  Drink adequate fluids to remain hydrated. Make an appointment to see your primary physician and Cardiology as soon as possible to monitor your symptoms. Return to the emergency department for chest pain, breathing difficulty, numbness, weakness or any new, worsening or concerning symptoms.

## 2019-05-09 NOTE — ED PROVIDER NOTES
"Encounter Date: 5/9/2019    SCRIBE #1 NOTE: I, Jules Paredes, am scribing for, and in the presence of,  Mk Michael MD. I have scribed the following portions of the note - Other sections scribed: HPI, ROS.       History     Chief Complaint   Patient presents with    Palpitations     "feels like heart is racing;" also c/o pain in shoulders; numbness and tingling in both arms; denies sob; denies cp     CC: Palpitations    HPI: This 68 y.o. Female with DM, HTN, R side cardiac stent and MI presents to the ED for an evaluation palpitations, neck pain, bilateral shoulder pain, headaches and bilateral arm and shoulder burning sensation since 6am this morning. Pt thought her burning sensation was b/c of hypoglycemia but reports her CBG was 121 this morning. She states she called her cardio's office this morning after her symptoms began and was told to come to the ED. Pt denies fever, chills, chest pain, cough or rhinorrhea. She has no hx of bypass grafting.    PCP: Dr. Kenneth Luu  Cardio: Dr. Bladimir Barbosa    The history is provided by the patient. No  was used.     Review of patient's allergies indicates:   Allergen Reactions    Novolin 70/30 (semi-synthetic) Nausea And Vomiting     Severe vomiting on Relion 70/30    Shellfish containing products Swelling    Sulfa (sulfonamide antibiotics) Anaphylaxis    Victoza [liraglutide] Nausea And Vomiting    Glipizide Nausea Only    Asa [aspirin]     Citric acid     Codeine     Egg derived     Iodine and iodide containing products     Rituxan [rituximab]     Soy     Talwin [pentazocine lactate]     Zoloft [sertraline]      Past Medical History:   Diagnosis Date    Anxiety     Coronary artery disease     Depression     Diabetes mellitus, type II     Follicular lymphoma     HTN (hypertension)     MI (myocardial infarction) 03/2019    Restless leg syndrome      Past Surgical History:   Procedure Laterality Date    COLONOSCOPY  11/07/2012    " Colon polyp found; repeat in 5 years    ELBOW SURGERY Right     dislocation repair     ESOPHAGOGASTRODUODENOSCOPY  11/07/2012    atrophic gastritis, H pylori testing negative    KNEE SURGERY Bilateral     scoped    Left heart cath Left 3/29/2019    Performed by Bladimir Barbosa MD at [facility] CATH LAB    Percutaneous coronary intervention N/A 3/29/2019    Performed by Bladimir Barbosa MD at [facility] CATH LAB     Family History   Problem Relation Age of Onset    Cancer Mother     Heart disease Mother     Cancer Father         lung    Diabetes Sister     Hypertension Sister     Stroke Neg Hx     Hyperlipidemia Neg Hx      Social History     Tobacco Use    Smoking status: Never Smoker    Smokeless tobacco: Never Used   Substance Use Topics    Alcohol use: No    Drug use: No     Review of Systems   Constitutional: Negative for chills and fever.   HENT: Negative for ear pain, rhinorrhea and sore throat.    Eyes: Negative for redness.   Respiratory: Negative for shortness of breath.    Cardiovascular: Positive for palpitations. Negative for chest pain.   Gastrointestinal: Negative for abdominal pain, diarrhea, nausea and vomiting.   Genitourinary: Negative for dysuria and hematuria.   Musculoskeletal: Positive for arthralgias and neck pain. Negative for back pain.   Skin: Negative for rash.   Neurological: Positive for numbness and headaches. Negative for weakness.   Hematological: Does not bruise/bleed easily.   Psychiatric/Behavioral: The patient is not nervous/anxious.        Physical Exam     Initial Vitals [05/09/19 0955]   BP Pulse Resp Temp SpO2   (!) 174/72 (!) 119 18 98.6 °F (37 °C) 96 %      MAP       --         Physical Exam  The patient was examined specifically for the following:   General:No significant distress, Good color, Warm and dry. Head and neck:Scalp atraumatic, Neck supple. Neurological:Appropriate conversation, Gross motor deficits. Eyes:Conjugate gaze, Clear corneas. ENT: No epistaxis.  Cardiac: Regular rate and rhythm, Grossly normal heart tones. Pulmonary: Wheezing, Rales. Gastrointestinal: Abdominal tenderness, Abdominal distention. Musculoskeletal: Extremity deformity, Apparent pain with range of motion of the joints. Skin: Rash.   The findings on examination were normal except for the following:  The patient has regular tachycardia.  The abdomen is soft.  The lungs are clear.  The heart tones are normal.  ED Course   Procedures  Labs Reviewed   CBC W/ AUTO DIFFERENTIAL - Abnormal; Notable for the following components:       Result Value    Hemoglobin 11.6 (*)     Hematocrit 36.2 (*)     Gran # (ANC) 10.0 (*)     Gran% 83.9 (*)     Lymph% 11.0 (*)     Mono% 3.6 (*)     All other components within normal limits   COMPREHENSIVE METABOLIC PANEL - Abnormal; Notable for the following components:    CO2 20 (*)     Glucose 267 (*)     eGFR if  54 (*)     eGFR if non  47 (*)     All other components within normal limits   MAGNESIUM - Abnormal; Notable for the following components:    Magnesium 1.1 (*)     All other components within normal limits   LACTIC ACID, PLASMA - Abnormal; Notable for the following components:    Lactate (Lactic Acid) 2.8 (*)     All other components within normal limits   URINALYSIS, REFLEX TO URINE CULTURE - Abnormal; Notable for the following components:    Occult Blood UA 1+ (*)     All other components within normal limits    Narrative:     Preferred Collection Type->Urine, Clean Catch   TSH   TROPONIN I   URINALYSIS MICROSCOPIC    Narrative:     Preferred Collection Type->Urine, Clean Catch   TROPONIN I   LACTIC ACID, PLASMA     EKG Readings: (Independently Interpreted)   This patient is in a sinus tachycardia with a heart rate of 118.  The p.r. QRS and QT intervals are normal.  There are minimal nonspecific ST segment and T-wave changes.  There is no definite evidence of acute myocardial infarction or malignant arrhythmia.  The patient does  have some low voltage QRS complexes.     ECG Results          EKG 12-lead (In process)  Result time 05/09/19 13:06:26    In process by Interface, [facility] (05/09/19 13:06:26)                 Narrative:    Test Reason :     Vent. Rate : 118 BPM     Atrial Rate : 118 BPM     P-R Int : 124 ms          QRS Dur : 068 ms      QT Int : 336 ms       P-R-T Axes : 070 012 -13 degrees     QTc Int : 470 ms    Sinus tachycardia  Possible Inferior infarct (cited on or before 09-MAY-2019)  Cannot rule out Anterior infarct (cited on or before 09-MAY-2019)  Abnormal ECG  When compared with ECG of 11-APR-2019 08:33,  Significant changes have occurred    Referred By: [email or web address]   SELF           Confirmed By:                   In process by Interface, Lab [facility] (05/09/19 13:02:01)                 Narrative:    Test Reason :     Vent. Rate : 118 BPM     Atrial Rate : 118 BPM     P-R Int : 124 ms          QRS Dur : 068 ms      QT Int : 336 ms       P-R-T Axes : 070 012 -13 degrees     QTc Int : 470 ms    Sinus tachycardia  Possible Inferior infarct ,age undetermined  Cannot rule out Anterior infarct ,age undetermined  Abnormal ECG      Referred By: [email or web address]   SELF           Confirmed By:                             Imaging Results          X-Ray Chest AP Portable (Final result)  Result time 05/09/19 13:07:40    Final result by Oscar Gale MD (05/09/19 13:07:40)                 Impression:      No acute abnormality.      Electronically signed by: Oscar Gale MD  Date:    05/09/2019  Time:    13:07             Narrative:    EXAMINATION:  XR CHEST AP PORTABLE    CLINICAL HISTORY:  chest pain;.    TECHNIQUE:  Single frontal portable view of the chest was performed.    COMPARISON:  04/09/2019    FINDINGS:  Support devices: None    There is aortic atherosclerosis.The lungs are clear, with normal appearance of pulmonary vasculature and no pleural effusion or pneumothorax.    The cardiac silhouette is normal in size. The hilar  and mediastinal contours are unremarkable.    Bones are intact.                                 Medical Decision Making:   ED Management:  Patient care taken over from Dr. Michael pending urinalysis results and reassessment after Ativan hydration.  I personally saw and evaluated this patient.  At time of my assessment patient is resting comfortably in stretcher.  She has no acute complaints at that time.  Labs reviewed and notable for.  Mildly elevated lactic acid at 2.8 as well as hypomagnesemia of 1.1.  Patient given IV hydration and magnesium supplementation.  Given patient's recent history of CAD a 2nd troponin was sent which remained within normal ranges.  Repeat lactic acid has normalized after hydration.  On reassessment patient reports feeling well. She denies current chest pain or shortness of breath. She reports that she did not take her prescribed metoprolol this morning but states that she has some at home.  Patient reports feeling well and requested discharge. Patient offered observation but declined stating that she feels well and will follow up closely with her primary physician and cardiologist.  Via shared decision-making I believe patient is stable for discharge. Patient and  counseled on supportive care, appropriate medication usage, concerning symptoms for which to return to ER and the importance of follow up. Understanding and agreement with treatment plan was expressed.   This chart was completed using dictation software, as a result there may be some transcription errors.             Scribe Attestation:   Scribe #1: I performed the above scribed service and the documentation accurately describes the services I performed. I attest to the accuracy of the note.    Attending Attestation:           Physician Attestation for Scribe:  Physician Attestation Statement for Scribe #1: I, Mk Michael MD, reviewed documentation, as scribed by Jules Paredes in my presence, and it is both accurate  and complete.                    Clinical Impression:       ICD-10-CM ICD-9-CM   1. Palpitations R00.2 785.1   2. Hypomagnesemia E83.42 275.2         Disposition:   Disposition: Discharged  Condition: Stable                        Darlyn Munroe MD  05/11/19 0812

## 2019-05-16 ENCOUNTER — OFFICE VISIT (OUTPATIENT)
Dept: CARDIOLOGY | Facility: CLINIC | Age: 69
End: 2019-05-16
Payer: MEDICARE

## 2019-05-16 VITALS
HEART RATE: 114 BPM | OXYGEN SATURATION: 96 % | SYSTOLIC BLOOD PRESSURE: 156 MMHG | WEIGHT: 198.44 LBS | DIASTOLIC BLOOD PRESSURE: 84 MMHG | RESPIRATION RATE: 16 BRPM | BODY MASS INDEX: 31.08 KG/M2

## 2019-05-16 DIAGNOSIS — M79.7 FIBROMYALGIA: ICD-10-CM

## 2019-05-16 DIAGNOSIS — E11.59 HYPERTENSION ASSOCIATED WITH DIABETES: ICD-10-CM

## 2019-05-16 DIAGNOSIS — F41.9 ANXIETY: ICD-10-CM

## 2019-05-16 DIAGNOSIS — I21.19 ACUTE INFERIOR MYOCARDIAL INFARCTION: ICD-10-CM

## 2019-05-16 DIAGNOSIS — E78.5 HYPERLIPIDEMIA ASSOCIATED WITH TYPE 2 DIABETES MELLITUS: ICD-10-CM

## 2019-05-16 DIAGNOSIS — R00.2 PALPITATION: ICD-10-CM

## 2019-05-16 DIAGNOSIS — I25.10 CORONARY ARTERY DISEASE INVOLVING NATIVE CORONARY ARTERY, ANGINA PRESENCE UNSPECIFIED, UNSPECIFIED WHETHER NATIVE OR TRANSPLANTED HEART: Primary | ICD-10-CM

## 2019-05-16 DIAGNOSIS — I15.2 HYPERTENSION ASSOCIATED WITH DIABETES: ICD-10-CM

## 2019-05-16 DIAGNOSIS — E11.69 HYPERLIPIDEMIA ASSOCIATED WITH TYPE 2 DIABETES MELLITUS: ICD-10-CM

## 2019-05-16 DIAGNOSIS — Z85.72 HISTORY OF NON-HODGKIN'S LYMPHOMA: ICD-10-CM

## 2019-05-16 PROCEDURE — 93000 ELECTROCARDIOGRAM COMPLETE: CPT | Mod: S$GLB,,, | Performed by: INTERNAL MEDICINE

## 2019-05-16 PROCEDURE — 99999 PR PBB SHADOW E&M-EST. PATIENT-LVL III: CPT | Mod: PBBFAC,,, | Performed by: INTERNAL MEDICINE

## 2019-05-16 PROCEDURE — 3079F PR MOST RECENT DIASTOLIC BLOOD PRESSURE 80-89 MM HG: ICD-10-PCS | Mod: CPTII,S$GLB,, | Performed by: INTERNAL MEDICINE

## 2019-05-16 PROCEDURE — 1101F PR PT FALLS ASSESS DOC 0-1 FALLS W/OUT INJ PAST YR: ICD-10-PCS | Mod: CPTII,S$GLB,, | Performed by: INTERNAL MEDICINE

## 2019-05-16 PROCEDURE — 99214 PR OFFICE/OUTPT VISIT, EST, LEVL IV, 30-39 MIN: ICD-10-PCS | Mod: S$GLB,,, | Performed by: INTERNAL MEDICINE

## 2019-05-16 PROCEDURE — 99499 UNLISTED E&M SERVICE: CPT | Mod: S$GLB,,, | Performed by: INTERNAL MEDICINE

## 2019-05-16 PROCEDURE — 99214 OFFICE O/P EST MOD 30 MIN: CPT | Mod: S$GLB,,, | Performed by: INTERNAL MEDICINE

## 2019-05-16 PROCEDURE — 3077F SYST BP >= 140 MM HG: CPT | Mod: CPTII,S$GLB,, | Performed by: INTERNAL MEDICINE

## 2019-05-16 PROCEDURE — 3046F HEMOGLOBIN A1C LEVEL >9.0%: CPT | Mod: CPTII,S$GLB,, | Performed by: INTERNAL MEDICINE

## 2019-05-16 PROCEDURE — 99499 RISK ADDL DX/OHS AUDIT: ICD-10-PCS | Mod: S$GLB,,, | Performed by: INTERNAL MEDICINE

## 2019-05-16 PROCEDURE — 1101F PT FALLS ASSESS-DOCD LE1/YR: CPT | Mod: CPTII,S$GLB,, | Performed by: INTERNAL MEDICINE

## 2019-05-16 PROCEDURE — 3046F PR MOST RECENT HEMOGLOBIN A1C LEVEL > 9.0%: ICD-10-PCS | Mod: CPTII,S$GLB,, | Performed by: INTERNAL MEDICINE

## 2019-05-16 PROCEDURE — 93000 EKG 12-LEAD: ICD-10-PCS | Mod: S$GLB,,, | Performed by: INTERNAL MEDICINE

## 2019-05-16 PROCEDURE — 99999 PR PBB SHADOW E&M-EST. PATIENT-LVL III: ICD-10-PCS | Mod: PBBFAC,,, | Performed by: INTERNAL MEDICINE

## 2019-05-16 PROCEDURE — 3079F DIAST BP 80-89 MM HG: CPT | Mod: CPTII,S$GLB,, | Performed by: INTERNAL MEDICINE

## 2019-05-16 PROCEDURE — 3077F PR MOST RECENT SYSTOLIC BLOOD PRESSURE >= 140 MM HG: ICD-10-PCS | Mod: CPTII,S$GLB,, | Performed by: INTERNAL MEDICINE

## 2019-05-16 NOTE — PROGRESS NOTES
Subjective:    Patient ID:  Lorena Contreras is a 68 y.o. female who presents for follow-up of No chief complaint on file.      HPI    Previous history:  Patient is here for follow-up of coronary artery disease and ST-elevation MI.  She underwent PCI to the RCA.  She is known to have other blockages well for which she re-presented to the emergency department was admitted for observation for atypical chest pain.  She felt like it was anxiety related and she no longer has had any since discharge.  She underwent nuclear stress test which showed no significant ischemia.  She denies any other associated symptoms currently.  She has experienced no PND, orthopnea or lower extremity edema.  She denies any dizziness, presyncope or syncope.  She says she was given Atarax on discharge which has helped with her anxiety.    Today:  Her follow-up coronary artery disease and previous ST-elevation MI.  She is feeling anxious and feels like she has a panic attack today.  She again is somewhat tearful and says that her  is been feeling ill as well.  She says she was started on Prozac by her primary care physician but does not feel like this is doing much in terms of calming her down.  She still has some mild residual chest pain when she mostly feels anxious.  This is dissimilar to her previous angina symptoms.  She denies any other associated symptoms.  She denies any PND, orthopnea or lower extremity edema.  She denies any dizziness, presyncope or syncope.      Review of Systems   Constitution: Negative.   HENT: Negative.    Eyes: Negative.    Cardiovascular: Negative for chest pain, dyspnea on exertion, irregular heartbeat, leg swelling, near-syncope, orthopnea, palpitations, paroxysmal nocturnal dyspnea and syncope.   Respiratory: Negative for shortness of breath.    Skin: Negative.    Musculoskeletal: Negative.    Gastrointestinal: Negative for abdominal pain, constipation and diarrhea.   Genitourinary: Negative for  dysuria.   Neurological: Negative.    Psychiatric/Behavioral: The patient is nervous/anxious.         Objective:    Physical Exam   Constitutional: She is oriented to person, place, and time. She appears well-developed and well-nourished.   HENT:   Head: Normocephalic and atraumatic.   Eyes: Pupils are equal, round, and reactive to light. Conjunctivae and EOM are normal.   Neck: Normal range of motion. Neck supple. No thyromegaly present.   Cardiovascular: Normal rate and regular rhythm.   No murmur heard.  Pulmonary/Chest: Effort normal and breath sounds normal. No respiratory distress.   Abdominal: Soft. Bowel sounds are normal.   Musculoskeletal: She exhibits no edema.   Neurological: She is alert and oriented to person, place, and time.   Skin: Skin is warm and dry.   Psychiatric: She has a normal mood and affect. Her behavior is normal.       LHC: 3-19  · Three vessel coronary artery disease.  · Successful PCI for acute myocardial infarction of culprit RCA.  · Prox RCA lesion , 99% stenosed reduced to 0%..  · A STENT RESOLUTE CRISSY 3.5X15MM stent was successfully placed at 14 ROGER  · Mid RCA lesion , 95% stenosed reduced to 0%..  · A STENT RESOLUTE CRISSY 3.0X12MM stent was successfully placed at 12 ROGER  · Prox Cx to Dist Cx lesion , 95% stenosed.  · Ost 1st Mrg to 1st Mrg lesion , 90% stenosed.  · Diffusely diseased LAD which is small vessel as well    Echo:  · Normal left ventricular systolic function. The estimated ejection fraction is 55%  · Concentric left ventricular hypertrophy.  · Grade II (moderate) left ventricular diastolic dysfunction consistent with pseudonormalization.  · Elevated left atrial pressure.  · Severe left atrial enlargement.  · The estimated PA systolic pressure is 52 mm Hg  · Pulmonary hypertension present.    NST: 4-19  · Abnormal myocardial perfusion scan  · There were no arrhythmias during stress.  · There was no ST segment deviation noted during stress.  · The patient reported  dizziness and nausea during the stress test.  · There is a small amount of mild, infarct defect(s) in the inferior wall(s),In the typical distribution of the RCA territory.  · LVEF post-stress is 68%  · The EKG portion of this study is negative for myocardial ischemia.    Assessment:       1. Coronary artery disease involving native coronary artery, angina presence unspecified, unspecified whether native or transplanted heart    2. Acute inferior myocardial infarction    3. Hypertension associated with diabetes    4. History of non-Hodgkin's lymphoma    5. Fibromyalgia    6. Hyperlipidemia associated with type 2 diabetes mellitus    7. Uncontrolled type 2 diabetes mellitus with diabetic polyneuropathy, with long-term current use of insulin    8. Anxiety         Plan:       -continue medical therapy  -referred to cardiac rehab  -PAD screen within normal limits  -any worsening symptoms consider staged PCI of left circumflex/OM  -refer to psychology    Return to clinic in 3 months

## 2019-06-03 ENCOUNTER — OFFICE VISIT (OUTPATIENT)
Dept: ENDOCRINOLOGY | Facility: CLINIC | Age: 69
End: 2019-06-03
Payer: MEDICARE

## 2019-06-03 VITALS
SYSTOLIC BLOOD PRESSURE: 148 MMHG | BODY MASS INDEX: 30.56 KG/M2 | WEIGHT: 195.13 LBS | HEART RATE: 104 BPM | DIASTOLIC BLOOD PRESSURE: 64 MMHG

## 2019-06-03 DIAGNOSIS — E11.3592 PROLIFERATIVE DIABETIC RETINOPATHY OF LEFT EYE ASSOCIATED WITH TYPE 2 DIABETES MELLITUS, UNSPECIFIED PROLIFERATIVE RETINOPATHY TYPE: ICD-10-CM
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
## 2019-06-03 DIAGNOSIS — G62.9 NEUROPATHY: ICD-10-CM

## 2019-06-03 DIAGNOSIS — I25.119 CORONARY ARTERY DISEASE INVOLVING NATIVE CORONARY ARTERY OF NATIVE HEART WITH ANGINA PECTORIS: ICD-10-CM

## 2019-06-03 PROCEDURE — 1101F PR PT FALLS ASSESS DOC 0-1 FALLS W/OUT INJ PAST YR: ICD-10-PCS | Mod: CPTII,S$GLB,, | Performed by: NURSE PRACTITIONER

## 2019-06-03 PROCEDURE — 99499 UNLISTED E&M SERVICE: CPT | Mod: S$GLB,,, | Performed by: NURSE PRACTITIONER

## 2019-06-03 PROCEDURE — 3046F HEMOGLOBIN A1C LEVEL >9.0%: CPT | Mod: CPTII,S$GLB,, | Performed by: NURSE PRACTITIONER

## 2019-06-03 PROCEDURE — 3078F DIAST BP <80 MM HG: CPT | Mod: CPTII,S$GLB,, | Performed by: NURSE PRACTITIONER

## 2019-06-03 PROCEDURE — 3078F PR MOST RECENT DIASTOLIC BLOOD PRESSURE < 80 MM HG: ICD-10-PCS | Mod: CPTII,S$GLB,, | Performed by: NURSE PRACTITIONER

## 2019-06-03 PROCEDURE — 3077F SYST BP >= 140 MM HG: CPT | Mod: CPTII,S$GLB,, | Performed by: NURSE PRACTITIONER

## 2019-06-03 PROCEDURE — 1101F PT FALLS ASSESS-DOCD LE1/YR: CPT | Mod: CPTII,S$GLB,, | Performed by: NURSE PRACTITIONER

## 2019-06-03 PROCEDURE — 99999 PR PBB SHADOW E&M-EST. PATIENT-LVL V: ICD-10-PCS | Mod: PBBFAC,,, | Performed by: NURSE PRACTITIONER

## 2019-06-03 PROCEDURE — 3046F PR MOST RECENT HEMOGLOBIN A1C LEVEL > 9.0%: ICD-10-PCS | Mod: CPTII,S$GLB,, | Performed by: NURSE PRACTITIONER

## 2019-06-03 PROCEDURE — 99499 RISK ADDL DX/OHS AUDIT: ICD-10-PCS | Mod: S$GLB,,, | Performed by: NURSE PRACTITIONER

## 2019-06-03 PROCEDURE — 3077F PR MOST RECENT SYSTOLIC BLOOD PRESSURE >= 140 MM HG: ICD-10-PCS | Mod: CPTII,S$GLB,, | Performed by: NURSE PRACTITIONER

## 2019-06-03 PROCEDURE — 99999 PR PBB SHADOW E&M-EST. PATIENT-LVL V: CPT | Mod: PBBFAC,,, | Performed by: NURSE PRACTITIONER

## 2019-06-03 PROCEDURE — 99214 PR OFFICE/OUTPT VISIT, EST, LEVL IV, 30-39 MIN: ICD-10-PCS | Mod: S$GLB,,, | Performed by: NURSE PRACTITIONER

## 2019-06-03 PROCEDURE — 99214 OFFICE O/P EST MOD 30 MIN: CPT | Mod: S$GLB,,, | Performed by: NURSE PRACTITIONER

## 2019-06-03 NOTE — PATIENT INSTRUCTIONS
Strange insulin regimen - generally Januvia is not taken as needed. Also mixed insulin is usually not taken TID.   Increase Novolog 70/30 to 36 units before lunch. Try to avoid eating heavy meals - which can be hard when you eat out. So take 30-36-30 units three times a day.   Make sure to test blood sugars before meals and inject insulin before eating as well. Glucoses will be very high if you don't time your insulin before meals.   Continue to take januvia if BG > 200; if scared to take it at night, still definitely take it if BG > 250 at night.     Test blood sugar 3x/day.   Return to clinic in 3 months with labs prior.     Please make appointment for eye exam.

## 2019-06-03 NOTE — PROGRESS NOTES
CC: This 68 y.o. White female  is here for evaluation of  T2DM along with comorbidities indicated in the Visit Diagnosis section of this encounter.    HPI: Lorena Contreras was diagnosed with T2DM in 2010.     Cardiology - Dr. Barbosa     Last seen by SCARLET Powell NP, in 11/2018. New to me.   She had an MI in 3/2019. Now on Brilinta. Has h/o falling into rx gap and unfortunately Medicaid has been stopped. She is looking into this.   Finds Januvia very effective. Takes it only if her BGs are in the 200s or more and BG will drop by 100 mg/dL. However she's scared to take januvia for high BGs at night d/t h/o overnight lows. No recent lows since hospital visit in March however.     Just had a fall on Thursday when her dog jumped and she tripped on her dress. Denies loss of consciousness or head injury. C/o left sided rib pain and bilateral leg pain.     PMHX: CAD s/p MI 3/2019 s/p stent placement (presented to ER with numbness throughout her body)    LAST DIABETES EDUCATION: never     HOSPITALIZED FOR DIABETES OR RELATED COMPLICATIONS -  No.    PRESCRIBED DIABETES MEDICATIONS: metformin 1000 mg bid, Novolog 70/30 30 units TID (10 am, 2 pm, 6-8 pm)   Misses medication doses - no     DM COMPLICATIONS: cardiovascular disease    SIGNIFICANT DIABETES MED HISTORY:   glipizide dc'd d/t recurrent hypoglycemia; also had nausea   Unable to afford analog MDI   victoza - severe nausea and abdominal pain   Started mixed analog insulin 5/2017  Relion 70/30 - severe vomiting     SELF MONITORING BLOOD GLUCOSE: Checks blood glucose at home 3x/day. -- many of her predinner BGs are tested AFTER meals.              HYPOGLYCEMIC EPISODES: none recent      CURRENT DIET: Doesn't generally eat breakfast. In the mornings, she usually just drinks coffee decaf but still takes her Novolog 70/30 30 units without any hypoglycemia.     CURRENT EXERCISE: no - will start cardiac rehab       BP (!) 150/68 (BP Location: Right arm, Patient Position:  Sitting, BP Method: Large (Manual))   Pulse 104   Wt 88.5 kg (195 lb 1.7 oz)   BMI 30.56 kg/m²     ROS:   CONSTITUTIONAL: Appetite good, denies fatigue  RESPIRATORY: No shortness of breath  CARDIAC: No chest pain  OTHER: n/a      PHYSICAL EXAM:  GENERAL: Well developed, well nourished. No acute distress.   PSYCH: AAOx3, appropriate mood and affect, conversant, well-groomed. Judgement and insight good.   NEURO: Cranial nerves grossly intact. Speech clear, no tremor.   CHEST: Respirations even and unlabored.        Hemoglobin A1C   Date Value Ref Range Status   03/28/2019 9.7 (H) 4.0 - 5.6 % Final     Comment:     ADA Screening Guidelines:  5.7-6.4%  Consistent with prediabetes  >or=6.5%  Consistent with diabetes  High levels of fetal hemoglobin interfere with the HbA1C  assay. Heterozygous hemoglobin variants (HbS, HgC, etc)do  not significantly interfere with this assay.   However, presence of multiple variants may affect accuracy.     11/26/2018 8.9 (H) 4.0 - 5.6 % Final     Comment:     ADA Screening Guidelines:  5.7-6.4%  Consistent with prediabetes  >or=6.5%  Consistent with diabetes  High levels of fetal hemoglobin interfere with the HbA1C  assay. Heterozygous hemoglobin variants (HbS, HgC, etc)do  not significantly interfere with this assay.   However, presence of multiple variants may affect accuracy.     11/26/2018 8.9 (H) 4.0 - 5.6 % Final     Comment:     ADA Screening Guidelines:  5.7-6.4%  Consistent with prediabetes  >or=6.5%  Consistent with diabetes  High levels of fetal hemoglobin interfere with the HbA1C  assay. Heterozygous hemoglobin variants (HbS, HgC, etc)do  not significantly interfere with this assay.   However, presence of multiple variants may affect accuracy.             Chemistry        Component Value Date/Time     05/09/2019 1042    K 4.0 05/09/2019 1042     05/09/2019 1042    CO2 20 (L) 05/09/2019 1042    BUN 22 05/09/2019 1042    CREATININE 1.2 05/09/2019 1042      (H) 05/09/2019 1042        Component Value Date/Time    CALCIUM 9.8 05/09/2019 1042    ALKPHOS 121 05/09/2019 1042    AST 38 05/09/2019 1042    ALT 33 05/09/2019 1042    BILITOT 0.6 05/09/2019 1042    ESTGFRAFRICA 54 (A) 05/09/2019 1042    EGFRNONAA 47 (A) 05/09/2019 1042          Lab Results   Component Value Date    LDLCALC 115.2 12/05/2017       Lab Results   Component Value Date    MICALBCREAT 709.2 (H) 03/28/2019         STANDARDS of CARE:        ASA:               Last eye exam:       ASSESSMENT and PLAN:    A1C GOAL:     1. Uncontrolled type 2 diabetes mellitus with diabetic polyneuropathy, with long-term current use of insulin  Strange insulin regimen - generally Januvia is not taken as needed. Also mixed insulin is usually not taken TID but BID. Surprised she does not experience lows after her first dose of insulin since she doesn't eat breakfast.     Increase Novolog 70/30 to 36 units before lunch. Try to avoid eating heavy meals - which can be hard when you eat out. So take 30-36-30 units three times a day.   Make sure to test blood sugars before meals and inject insulin before eating as well. Glucoses will be very high if you don't time your insulin before meals.   Continue to take januvia if BG > 200; if scared to take it at night, still definitely take it if BG > 250 at night.     Test blood sugar 3x/day.   Return to clinic in 3 months with labs prior.         Ambulatory referral to Podiatry    Hemoglobin A1c   2. Fall, initial encounter  X-Ray Chest PA And Lateral   3. Proliferative diabetic retinopathy of left eye associated with type 2 diabetes mellitus, unspecified proliferative retinopathy type  Improve glycemic control.   Please make appointment for eye exam.      4. Coronary artery disease involving native coronary artery of native heart with angina pectoris  Followed by Dr. Barbosa.     Improve glycemic control.      5. Neuropathy  Ambulatory referral to Podiatry       Orders Placed This  Encounter   Procedures    X-Ray Chest PA And Lateral     Standing Status:   Future     Standing Expiration Date:   6/3/2020     Order Specific Question:   May the Radiologist modify the order per protocol to meet the clinical needs of the patient?     Answer:   Yes    Hemoglobin A1c     Standing Status:   Future     Standing Expiration Date:   8/1/2020    Ambulatory referral to Podiatry     Referral Priority:   Routine     Referral Type:   Consultation     Referral Reason:   Specialty Services Required     Requested Specialty:   Podiatry     Number of Visits Requested:   1        Follow up in about 3 months (around 9/3/2019).

## 2019-07-02 ENCOUNTER — PATIENT OUTREACH (OUTPATIENT)
Dept: ADMINISTRATIVE | Facility: OTHER | Age: 69
End: 2019-07-02

## 2019-07-03 ENCOUNTER — OFFICE VISIT (OUTPATIENT)
Dept: CARDIOLOGY | Facility: CLINIC | Age: 69
End: 2019-07-03
Payer: MEDICARE

## 2019-07-03 VITALS
DIASTOLIC BLOOD PRESSURE: 74 MMHG | HEIGHT: 69 IN | BODY MASS INDEX: 28.08 KG/M2 | HEART RATE: 106 BPM | SYSTOLIC BLOOD PRESSURE: 171 MMHG | WEIGHT: 189.63 LBS | OXYGEN SATURATION: 98 %

## 2019-07-03 DIAGNOSIS — I10 HYPERTENSION: ICD-10-CM

## 2019-07-03 DIAGNOSIS — I25.10 CORONARY ARTERY DISEASE INVOLVING NATIVE CORONARY ARTERY OF NATIVE HEART WITHOUT ANGINA PECTORIS: ICD-10-CM

## 2019-07-03 DIAGNOSIS — E78.5 HYPERLIPIDEMIA ASSOCIATED WITH TYPE 2 DIABETES MELLITUS: Chronic | ICD-10-CM

## 2019-07-03 DIAGNOSIS — E11.59 HYPERTENSION ASSOCIATED WITH DIABETES: Chronic | ICD-10-CM

## 2019-07-03 DIAGNOSIS — E11.42 DIABETIC PERIPHERAL NEUROPATHY ASSOCIATED WITH TYPE 2 DIABETES MELLITUS: Primary | Chronic | ICD-10-CM

## 2019-07-03 DIAGNOSIS — R00.2 PALPITATIONS: Chronic | ICD-10-CM

## 2019-07-03 DIAGNOSIS — I15.2 HYPERTENSION ASSOCIATED WITH DIABETES: Chronic | ICD-10-CM

## 2019-07-03 DIAGNOSIS — I51.7 CARDIOMEGALY: ICD-10-CM

## 2019-07-03 DIAGNOSIS — E11.69 HYPERLIPIDEMIA ASSOCIATED WITH TYPE 2 DIABETES MELLITUS: Chronic | ICD-10-CM

## 2019-07-03 DIAGNOSIS — I20.89 OTHER FORMS OF ANGINA PECTORIS: ICD-10-CM

## 2019-07-03 PROCEDURE — 99203 PR OFFICE/OUTPT VISIT, NEW, LEVL III, 30-44 MIN: ICD-10-PCS | Mod: S$GLB,,, | Performed by: INTERNAL MEDICINE

## 2019-07-03 PROCEDURE — 99999 PR PBB SHADOW E&M-EST. PATIENT-LVL III: ICD-10-PCS | Mod: PBBFAC,,, | Performed by: INTERNAL MEDICINE

## 2019-07-03 PROCEDURE — 1101F PT FALLS ASSESS-DOCD LE1/YR: CPT | Mod: CPTII,S$GLB,, | Performed by: INTERNAL MEDICINE

## 2019-07-03 PROCEDURE — 3077F SYST BP >= 140 MM HG: CPT | Mod: CPTII,S$GLB,, | Performed by: INTERNAL MEDICINE

## 2019-07-03 PROCEDURE — 3078F PR MOST RECENT DIASTOLIC BLOOD PRESSURE < 80 MM HG: ICD-10-PCS | Mod: CPTII,S$GLB,, | Performed by: INTERNAL MEDICINE

## 2019-07-03 PROCEDURE — 3046F HEMOGLOBIN A1C LEVEL >9.0%: CPT | Mod: CPTII,S$GLB,, | Performed by: INTERNAL MEDICINE

## 2019-07-03 PROCEDURE — 93000 EKG 12-LEAD: ICD-10-PCS | Mod: S$GLB,,, | Performed by: INTERNAL MEDICINE

## 2019-07-03 PROCEDURE — 99203 OFFICE O/P NEW LOW 30 MIN: CPT | Mod: S$GLB,,, | Performed by: INTERNAL MEDICINE

## 2019-07-03 PROCEDURE — 3046F PR MOST RECENT HEMOGLOBIN A1C LEVEL > 9.0%: ICD-10-PCS | Mod: CPTII,S$GLB,, | Performed by: INTERNAL MEDICINE

## 2019-07-03 PROCEDURE — 3078F DIAST BP <80 MM HG: CPT | Mod: CPTII,S$GLB,, | Performed by: INTERNAL MEDICINE

## 2019-07-03 PROCEDURE — 3077F PR MOST RECENT SYSTOLIC BLOOD PRESSURE >= 140 MM HG: ICD-10-PCS | Mod: CPTII,S$GLB,, | Performed by: INTERNAL MEDICINE

## 2019-07-03 PROCEDURE — 99999 PR PBB SHADOW E&M-EST. PATIENT-LVL III: CPT | Mod: PBBFAC,,, | Performed by: INTERNAL MEDICINE

## 2019-07-03 PROCEDURE — 1101F PR PT FALLS ASSESS DOC 0-1 FALLS W/OUT INJ PAST YR: ICD-10-PCS | Mod: CPTII,S$GLB,, | Performed by: INTERNAL MEDICINE

## 2019-07-03 PROCEDURE — 93000 ELECTROCARDIOGRAM COMPLETE: CPT | Mod: S$GLB,,, | Performed by: INTERNAL MEDICINE

## 2019-07-03 RX ORDER — METOPROLOL SUCCINATE 50 MG/1
50 TABLET, EXTENDED RELEASE ORAL DAILY
Qty: 90 TABLET | Refills: 3 | Status: SHIPPED | OUTPATIENT
Start: 2019-07-03 | End: 2019-09-04

## 2019-07-03 NOTE — PROGRESS NOTES
Subjective:    Patient ID:  Lorena Contreras is a 68 y.o. female who presents for follow-up of Congestive Heart Failure      HPI     Previously followed by Dr Barbosa - last seen 5/16/19  Patient is here for follow-up of coronary artery disease and ST-elevation MI.  She underwent PCI to the RCA.  She is known to have other blockages well for which she re-presented to the emergency department was admitted for observation for atypical chest pain.  She felt like it was anxiety related and she no longer has had any since discharge.  She underwent nuclear stress test which showed no significant ischemia.  She denies any other associated symptoms currently.  She has experienced no PND, orthopnea or lower extremity edema.  She denies any dizziness, presyncope or syncope.  She says she was given Atarax on discharge which has helped with her anxiety.     Today:  Her follow-up coronary artery disease and previous ST-elevation MI.  She is feeling anxious and feels like she has a panic attack today.  She again is somewhat tearful and says that her  is been feeling ill as well.  She says she was started on Prozac by her primary care physician but does not feel like this is doing much in terms of calming her down.  She still has some mild residual chest pain when she mostly feels anxious.  This is dissimilar to her previous angina symptoms.  She denies any other associated symptoms.  She denies any PND, orthopnea or lower extremity edema.  She denies any dizziness, presyncope or syncope.       [facility]: 3-19  · Three vessel coronary artery disease.  · Successful PCI for acute myocardial infarction of culprit RCA.  · Prox RCA lesion , 99% stenosed reduced to 0%..  · A STENT RESOLUTE CRISSY 3.5X15MM stent was successfully placed at 14 ROGER  · Mid RCA lesion , 95% stenosed reduced to 0%..  · A STENT RESOLUTE CRISSY 3.0X12MM stent was successfully placed at 12 ROGER  · Prox Cx to Dist Cx lesion , 95% stenosed.  · Ost 1st Mrg to 1st Mrg  lesion , 90% stenosed.  · Diffusely diseased LAD which is small vessel as well     Echo: 3/30/19  · Normal left ventricular systolic function. The estimated ejection fraction is 55%  · Concentric left ventricular hypertrophy.  · Grade II (moderate) left ventricular diastolic dysfunction consistent with pseudonormalization.  · Elevated left atrial pressure.  · Severe left atrial enlargement.  · The estimated PA systolic pressure is 52 mm Hg  · Pulmonary hypertension present.     NST: 4-19  · Abnormal myocardial perfusion scan  · There were no arrhythmias during stress.  · There was no ST segment deviation noted during stress.  · The patient reported dizziness and nausea during the stress test.  · There is a small amount of mild, infarct defect(s) in the inferior wall(s),In the typical distribution of the RCA territory.  · LVEF post-stress is 68%  · The EKG portion of this study is negative for myocardial ischemia.     HR has been running in th 100-130 ranges even at rest at rehab  Has on atenolol previously which was switched to metoprolol after MI  Denies significant CP or SOB   sinus tachycardia - old IMI      Review of Systems   Constitution: Negative for decreased appetite.   HENT: Negative for ear discharge.    Eyes: Negative for blurred vision.   Respiratory: Negative for hemoptysis.    Endocrine: Negative for polyphagia.   Hematologic/Lymphatic: Negative for adenopathy.   Skin: Negative for color change.   Musculoskeletal: Negative for joint swelling.   Genitourinary: Negative for bladder incontinence.   Neurological: Negative for brief paralysis.   Psychiatric/Behavioral: Negative for hallucinations.   Allergic/Immunologic: Negative for hives.        Objective:    Physical Exam   Constitutional: She is oriented to person, place, and time. She appears well-developed and well-nourished.   HENT:   Head: Normocephalic and atraumatic.   Eyes: Pupils are equal, round, and reactive to light. Conjunctivae and  EOM are normal.   Neck: Normal range of motion. Neck supple. No thyromegaly present.   Cardiovascular: Normal rate and regular rhythm.   No murmur heard.  Pulmonary/Chest: Effort normal and breath sounds normal. No respiratory distress.   Abdominal: Soft. Bowel sounds are normal.   Musculoskeletal: She exhibits no edema.   Neurological: She is alert and oriented to person, place, and time.   Skin: Skin is warm and dry.   Psychiatric: She has a normal mood and affect. Her behavior is normal.         Assessment:       1. Diabetic peripheral neuropathy associated with type 2 diabetes mellitus    2. Hypertension associated with diabetes    3. Palpitations    4. Hyperlipidemia associated with type 2 diabetes mellitus    5. Cardiomegaly    6. Coronary artery disease involving native coronary artery of native heart without angina pectoris    7. Uncontrolled type 2 diabetes mellitus with diabetic polyneuropathy, with long-term current use of insulin    8. Other forms of angina pectoris         Plan:       Increase toprol XL 50 qd  Holter  OV 1 week

## 2019-07-17 ENCOUNTER — HOSPITAL ENCOUNTER (OUTPATIENT)
Dept: CARDIOLOGY | Facility: HOSPITAL | Age: 69
Discharge: HOME | End: 2019-07-17
Attending: INTERNAL MEDICINE
Payer: MEDICARE

## 2019-07-17 DIAGNOSIS — I51.7 CARDIOMEGALY: ICD-10-CM

## 2019-07-17 DIAGNOSIS — E78.5 HYPERLIPIDEMIA ASSOCIATED WITH TYPE 2 DIABETES MELLITUS: ICD-10-CM

## 2019-07-17 DIAGNOSIS — I15.2 HYPERTENSION ASSOCIATED WITH DIABETES: ICD-10-CM

## 2019-07-17 DIAGNOSIS — I25.10 CORONARY ARTERY DISEASE INVOLVING NATIVE CORONARY ARTERY OF NATIVE HEART WITHOUT ANGINA PECTORIS: ICD-10-CM

## 2019-07-17 DIAGNOSIS — E11.42 DIABETIC PERIPHERAL NEUROPATHY ASSOCIATED WITH TYPE 2 DIABETES MELLITUS: ICD-10-CM

## 2019-07-17 DIAGNOSIS — E11.59 HYPERTENSION ASSOCIATED WITH DIABETES: ICD-10-CM

## 2019-07-17 DIAGNOSIS — E11.69 HYPERLIPIDEMIA ASSOCIATED WITH TYPE 2 DIABETES MELLITUS: ICD-10-CM

## 2019-07-17 DIAGNOSIS — R00.2 PALPITATIONS: ICD-10-CM

## 2019-07-17 DIAGNOSIS — I20.89 OTHER FORMS OF ANGINA PECTORIS: ICD-10-CM

## 2019-07-17 PROCEDURE — 93227 HOLTER MONITOR - 24 HOUR (CUPID ONLY): ICD-10-PCS | Mod: ,,, | Performed by: INTERNAL MEDICINE

## 2019-07-17 PROCEDURE — 93225 XTRNL ECG REC<48 HRS REC: CPT

## 2019-07-17 PROCEDURE — 93227 XTRNL ECG REC<48 HR R&I: CPT | Mod: ,,, | Performed by: INTERNAL MEDICINE

## 2019-07-19 LAB
OHS CV EVENT MONITOR DAY: 1
OHS CV HOLTER LENGTH DECIMAL HOURS: 47.98
OHS CV HOLTER LENGTH HOURS: 23
OHS CV HOLTER LENGTH MINUTES: 59

## 2019-07-29 ENCOUNTER — PATIENT OUTREACH (OUTPATIENT)
Dept: ADMINISTRATIVE | Facility: OTHER | Age: 69
End: 2019-07-29

## 2019-07-29 DIAGNOSIS — E11.9 TYPE 2 DIABETES MELLITUS WITHOUT COMPLICATION, UNSPECIFIED WHETHER LONG TERM INSULIN USE: Primary | ICD-10-CM

## 2019-08-29 ENCOUNTER — PATIENT OUTREACH (OUTPATIENT)
Dept: ADMINISTRATIVE | Facility: OTHER | Age: 69
End: 2019-08-29

## 2019-09-04 ENCOUNTER — PATIENT OUTREACH (OUTPATIENT)
Dept: ADMINISTRATIVE | Facility: OTHER | Age: 69
End: 2019-09-04

## 2019-09-04 ENCOUNTER — OFFICE VISIT (OUTPATIENT)
Dept: CARDIOLOGY | Facility: CLINIC | Age: 69
End: 2019-09-04
Payer: MEDICARE

## 2019-09-04 VITALS
HEIGHT: 69 IN | SYSTOLIC BLOOD PRESSURE: 160 MMHG | BODY MASS INDEX: 27.75 KG/M2 | OXYGEN SATURATION: 99 % | WEIGHT: 187.38 LBS | HEART RATE: 100 BPM | DIASTOLIC BLOOD PRESSURE: 74 MMHG

## 2019-09-04 DIAGNOSIS — I51.7 CARDIOMEGALY: ICD-10-CM

## 2019-09-04 DIAGNOSIS — I25.10 CORONARY ARTERY DISEASE INVOLVING NATIVE CORONARY ARTERY OF NATIVE HEART WITHOUT ANGINA PECTORIS: ICD-10-CM

## 2019-09-04 DIAGNOSIS — I21.19 ACUTE INFERIOR MYOCARDIAL INFARCTION: Chronic | ICD-10-CM

## 2019-09-04 DIAGNOSIS — R00.2 PALPITATIONS: Chronic | ICD-10-CM

## 2019-09-04 DIAGNOSIS — I15.2 HYPERTENSION ASSOCIATED WITH DIABETES: Primary | Chronic | ICD-10-CM

## 2019-09-04 DIAGNOSIS — E11.9 TYPE 2 DIABETES MELLITUS WITHOUT COMPLICATION, UNSPECIFIED WHETHER LONG TERM INSULIN USE: Primary | ICD-10-CM

## 2019-09-04 DIAGNOSIS — E11.59 HYPERTENSION ASSOCIATED WITH DIABETES: Primary | Chronic | ICD-10-CM

## 2019-09-04 DIAGNOSIS — E78.5 HYPERLIPIDEMIA ASSOCIATED WITH TYPE 2 DIABETES MELLITUS: Chronic | ICD-10-CM

## 2019-09-04 DIAGNOSIS — E11.69 HYPERLIPIDEMIA ASSOCIATED WITH TYPE 2 DIABETES MELLITUS: Chronic | ICD-10-CM

## 2019-09-04 PROCEDURE — 99499 UNLISTED E&M SERVICE: CPT | Mod: S$GLB,,, | Performed by: INTERNAL MEDICINE

## 2019-09-04 PROCEDURE — 99214 PR OFFICE/OUTPT VISIT, EST, LEVL IV, 30-39 MIN: ICD-10-PCS | Mod: S$GLB,,, | Performed by: INTERNAL MEDICINE

## 2019-09-04 PROCEDURE — 3046F PR MOST RECENT HEMOGLOBIN A1C LEVEL > 9.0%: ICD-10-PCS | Mod: CPTII,S$GLB,, | Performed by: INTERNAL MEDICINE

## 2019-09-04 PROCEDURE — 3078F PR MOST RECENT DIASTOLIC BLOOD PRESSURE < 80 MM HG: ICD-10-PCS | Mod: CPTII,S$GLB,, | Performed by: INTERNAL MEDICINE

## 2019-09-04 PROCEDURE — 3046F HEMOGLOBIN A1C LEVEL >9.0%: CPT | Mod: CPTII,S$GLB,, | Performed by: INTERNAL MEDICINE

## 2019-09-04 PROCEDURE — 1101F PR PT FALLS ASSESS DOC 0-1 FALLS W/OUT INJ PAST YR: ICD-10-PCS | Mod: CPTII,S$GLB,, | Performed by: INTERNAL MEDICINE

## 2019-09-04 PROCEDURE — 99499 RISK ADDL DX/OHS AUDIT: ICD-10-PCS | Mod: S$GLB,,, | Performed by: INTERNAL MEDICINE

## 2019-09-04 PROCEDURE — 99999 PR PBB SHADOW E&M-EST. PATIENT-LVL III: CPT | Mod: PBBFAC,,, | Performed by: INTERNAL MEDICINE

## 2019-09-04 PROCEDURE — 99214 OFFICE O/P EST MOD 30 MIN: CPT | Mod: S$GLB,,, | Performed by: INTERNAL MEDICINE

## 2019-09-04 PROCEDURE — 1101F PT FALLS ASSESS-DOCD LE1/YR: CPT | Mod: CPTII,S$GLB,, | Performed by: INTERNAL MEDICINE

## 2019-09-04 PROCEDURE — 3077F SYST BP >= 140 MM HG: CPT | Mod: CPTII,S$GLB,, | Performed by: INTERNAL MEDICINE

## 2019-09-04 PROCEDURE — 99999 PR PBB SHADOW E&M-EST. PATIENT-LVL III: ICD-10-PCS | Mod: PBBFAC,,, | Performed by: INTERNAL MEDICINE

## 2019-09-04 PROCEDURE — 3077F PR MOST RECENT SYSTOLIC BLOOD PRESSURE >= 140 MM HG: ICD-10-PCS | Mod: CPTII,S$GLB,, | Performed by: INTERNAL MEDICINE

## 2019-09-04 PROCEDURE — 3078F DIAST BP <80 MM HG: CPT | Mod: CPTII,S$GLB,, | Performed by: INTERNAL MEDICINE

## 2019-09-04 RX ORDER — ATENOLOL 50 MG/1
50 TABLET ORAL DAILY
Qty: 90 TABLET | Refills: 3 | Status: SHIPPED | OUTPATIENT
Start: 2019-09-04 | End: 2019-12-09 | Stop reason: SDUPTHER

## 2019-09-04 NOTE — PROGRESS NOTES
Subjective:    Patient ID:  Lorena Contreras is a 68 y.o. female who presents for follow-up of Results      HPI     Previously followed by Dr Barbosa - last seen 5/16/19  Patient is here for follow-up of coronary artery disease and ST-elevation MI.  She underwent PCI to the RCA.  She is known to have other blockages well for which she re-presented to the emergency department was admitted for observation for atypical chest pain.  She felt like it was anxiety related and she no longer has had any since discharge.  She underwent nuclear stress test which showed no significant ischemia.  She denies any other associated symptoms currently.  She has experienced no PND, orthopnea or lower extremity edema.  She denies any dizziness, presyncope or syncope.  She says she was given Atarax on discharge which has helped with her anxiety.     Today:  Her follow-up coronary artery disease and previous ST-elevation MI.  She is feeling anxious and feels like she has a panic attack today.  She again is somewhat tearful and says that her  is been feeling ill as well.  She says she was started on Prozac by her primary care physician but does not feel like this is doing much in terms of calming her down.  She still has some mild residual chest pain when she mostly feels anxious.  This is dissimilar to her previous angina symptoms.  She denies any other associated symptoms.  She denies any PND, orthopnea or lower extremity edema.  She denies any dizziness, presyncope or syncope.       [facility]: 3-19  · Three vessel coronary artery disease.  · Successful PCI for acute myocardial infarction of culprit RCA.  · Prox RCA lesion , 99% stenosed reduced to 0%..  · A STENT RESOLUTE CRISSY 3.5X15MM stent was successfully placed at 14 ROGER  · Mid RCA lesion , 95% stenosed reduced to 0%..  · A STENT RESOLUTE CRISSY 3.0X12MM stent was successfully placed at 12 ROGER  · Prox Cx to Dist Cx lesion , 95% stenosed.  · Ost 1st Mrg to 1st Mrg lesion , 90%  stenosed.  · Diffusely diseased LAD which is small vessel as well     Echo: 3/30/19  · Normal left ventricular systolic function. The estimated ejection fraction is 55%  · Concentric left ventricular hypertrophy.  · Grade II (moderate) left ventricular diastolic dysfunction consistent with pseudonormalization.  · Elevated left atrial pressure.  · Severe left atrial enlargement.  · The estimated PA systolic pressure is 52 mm Hg  · Pulmonary hypertension present.     NST: 4-19  · Abnormal myocardial perfusion scan  · There were no arrhythmias during stress.  · There was no ST segment deviation noted during stress.  · The patient reported dizziness and nausea during the stress test.  · There is a small amount of mild, infarct defect(s) in the inferior wall(s),In the typical distribution of the RCA territory.  · LVEF post-stress is 68%  · The EKG portion of this study is negative for myocardial ischemia.    Holter 7/17/19  · Sinus rhythm with heart rates varying between 82 and 136 bpm with an average of 96 bpm.  · There were very rare PVCs totalling 21 and averaging 0.44 per hour.  · There were very rare PACs totalling 30 and averaging 0.63 per hour.  · The diary was returned blank.    Carotid US 5/6/29  · There is 20-39% right Internal Carotid Stenosis.  · There is 0-19% left Internal Carotid Stenosis.     LE arterial doppler 5/6/19  · Hemodynamically significant greater than 70% lesion in the L SFA proximally  · Moderate greater than 50% plaque noted in the right SFA  · Noncompressible CLEMENT bilaterally     7/3/19 HR has been running in th 100-130 ranges even at rest at rehab  Has on atenolol previously which was switched to metoprolol after MI  Denies significant CP or SOB   sinus tachycardia - old IMI  Increase toprol XL 50 qd  Holter  OV 1 week    Says metoprolol gives her diarrhea - wants to go back to atenolol  Denies CP or SOB  Mild stable claudication       Review of Systems   Constitution: Negative for  decreased appetite.   HENT: Negative for ear discharge.    Eyes: Negative for blurred vision.   Respiratory: Negative for hemoptysis.    Endocrine: Negative for polyphagia.   Hematologic/Lymphatic: Negative for adenopathy.   Skin: Negative for color change.   Musculoskeletal: Negative for joint swelling.   Genitourinary: Negative for bladder incontinence.   Neurological: Negative for brief paralysis.   Psychiatric/Behavioral: Negative for hallucinations.   Allergic/Immunologic: Negative for hives.        Objective:    Physical Exam   Constitutional: She is oriented to person, place, and time. She appears well-developed and well-nourished.   HENT:   Head: Normocephalic and atraumatic.   Eyes: Pupils are equal, round, and reactive to light. Conjunctivae and EOM are normal.   Neck: Normal range of motion. Neck supple. No thyromegaly present.   Cardiovascular: Normal rate and regular rhythm.   No murmur heard.  Pulmonary/Chest: Effort normal and breath sounds normal. No respiratory distress.   Abdominal: Soft. Bowel sounds are normal.   Musculoskeletal: She exhibits no edema.   Neurological: She is alert and oriented to person, place, and time.   Skin: Skin is warm and dry.   Psychiatric: She has a normal mood and affect. Her behavior is normal.         Assessment:       1. Hypertension associated with diabetes    2. Palpitations    3. Hyperlipidemia associated with type 2 diabetes mellitus    4. Acute inferior myocardial infarction    5. Cardiomegaly    6. Coronary artery disease involving native coronary artery of native heart without angina pectoris         Plan:       Stop metoprolol  Atenolol 50 qd - can increase to 100 qd if elevated HR persists  If claudication worsens will refer to vascular surgery  OV 3 months

## 2019-09-06 DIAGNOSIS — Z79.4 UNCONTROLLED TYPE 2 DIABETES MELLITUS WITH HYPERGLYCEMIA, WITH LONG-TERM CURRENT USE OF INSULIN: ICD-10-CM

## 2019-09-06 DIAGNOSIS — E11.65 UNCONTROLLED TYPE 2 DIABETES MELLITUS WITH HYPERGLYCEMIA, WITH LONG-TERM CURRENT USE OF INSULIN: ICD-10-CM

## 2019-09-06 RX ORDER — SITAGLIPTIN 100 MG/1
TABLET, FILM COATED ORAL
Qty: 30 TABLET | Refills: 3 | Status: SHIPPED | OUTPATIENT
Start: 2019-09-06 | End: 2020-03-25 | Stop reason: SDUPTHER

## 2019-09-06 RX ORDER — METFORMIN HYDROCHLORIDE 1000 MG/1
TABLET ORAL
Qty: 180 TABLET | Refills: 0 | Status: SHIPPED | OUTPATIENT
Start: 2019-09-06 | End: 2020-03-05

## 2019-09-13 DIAGNOSIS — E11.59 HYPERTENSION ASSOCIATED WITH DIABETES: ICD-10-CM

## 2019-09-13 DIAGNOSIS — I15.2 HYPERTENSION ASSOCIATED WITH DIABETES: ICD-10-CM

## 2019-09-13 RX ORDER — HYDROCHLOROTHIAZIDE 25 MG/1
TABLET ORAL
Qty: 90 TABLET | Refills: 1 | Status: SHIPPED | OUTPATIENT
Start: 2019-09-13 | End: 2020-03-05

## 2019-11-17 ENCOUNTER — HOSPITAL ENCOUNTER (OUTPATIENT)
Facility: HOSPITAL | Age: 69
Discharge: HOME | End: 2019-11-18
Attending: EMERGENCY MEDICINE | Admitting: INTERNAL MEDICINE
Payer: MEDICARE

## 2019-11-17 DIAGNOSIS — I25.10 CORONARY ARTERY DISEASE INVOLVING NATIVE CORONARY ARTERY OF NATIVE HEART WITHOUT ANGINA PECTORIS: ICD-10-CM

## 2019-11-17 DIAGNOSIS — R07.9 CHEST PAIN: Primary | ICD-10-CM

## 2019-11-17 LAB
ALBUMIN SERPL BCP-MCNC: 3.9 G/DL (ref 3.5–5.2)
ALP SERPL-CCNC: 118 U/L (ref 55–135)
ALT SERPL W/O P-5'-P-CCNC: 34 U/L (ref 10–44)
ANION GAP SERPL CALC-SCNC: 13 MMOL/L (ref 8–16)
AST SERPL-CCNC: 38 U/L (ref 10–40)
BASOPHILS # BLD AUTO: 0.08 K/UL (ref 0–0.2)
BASOPHILS NFR BLD: 0.6 % (ref 0–1.9)
BILIRUB SERPL-MCNC: 0.6 MG/DL (ref 0.1–1)
BNP SERPL-MCNC: 75 PG/ML (ref 0–99)
BUN SERPL-MCNC: 24 MG/DL (ref 8–23)
CALCIUM SERPL-MCNC: 10.1 MG/DL (ref 8.7–10.5)
CHLORIDE SERPL-SCNC: 102 MMOL/L (ref 95–110)
CO2 SERPL-SCNC: 20 MMOL/L (ref 23–29)
CREAT SERPL-MCNC: 1.2 MG/DL (ref 0.5–1.4)
DIFFERENTIAL METHOD: ABNORMAL
EOSINOPHIL # BLD AUTO: 0.2 K/UL (ref 0–0.5)
EOSINOPHIL NFR BLD: 1.8 % (ref 0–8)
ERYTHROCYTE [DISTWIDTH] IN BLOOD BY AUTOMATED COUNT: 13.3 % (ref 11.5–14.5)
EST. GFR  (AFRICAN AMERICAN): 53 ML/MIN/1.73 M^2
EST. GFR  (NON AFRICAN AMERICAN): 46 ML/MIN/1.73 M^2
GLUCOSE SERPL-MCNC: 305 MG/DL (ref 70–110)
HCT VFR BLD AUTO: 36.4 % (ref 37–48.5)
HGB BLD-MCNC: 11.6 G/DL (ref 12–16)
IMM GRANULOCYTES # BLD AUTO: 0.06 K/UL (ref 0–0.04)
IMM GRANULOCYTES NFR BLD AUTO: 0.5 % (ref 0–0.5)
INR PPP: 1 (ref 0.8–1.2)
LYMPHOCYTES # BLD AUTO: 1.3 K/UL (ref 1–4.8)
LYMPHOCYTES NFR BLD: 10.5 % (ref 18–48)
MCH RBC QN AUTO: 28.4 PG (ref 27–31)
MCHC RBC AUTO-ENTMCNC: 31.9 G/DL (ref 32–36)
MCV RBC AUTO: 89 FL (ref 82–98)
MONOCYTES # BLD AUTO: 0.7 K/UL (ref 0.3–1)
MONOCYTES NFR BLD: 5.4 % (ref 4–15)
NEUTROPHILS # BLD AUTO: 10.1 K/UL (ref 1.8–7.7)
NEUTROPHILS NFR BLD: 81.2 % (ref 38–73)
NRBC BLD-RTO: 0 /100 WBC
PLATELET # BLD AUTO: 251 K/UL (ref 150–350)
PMV BLD AUTO: 12.9 FL (ref 9.2–12.9)
POCT GLUCOSE: 190 MG/DL (ref 70–110)
POCT GLUCOSE: 215 MG/DL (ref 70–110)
POTASSIUM SERPL-SCNC: 4.4 MMOL/L (ref 3.5–5.1)
PROT SERPL-MCNC: 7.4 G/DL (ref 6–8.4)
PROTHROMBIN TIME: 10.8 SEC (ref 9–12.5)
RBC # BLD AUTO: 4.08 M/UL (ref 4–5.4)
SODIUM SERPL-SCNC: 135 MMOL/L (ref 136–145)
TROPONIN I SERPL DL<=0.01 NG/ML-MCNC: 0.01 NG/ML (ref 0–0.03)
WBC # BLD AUTO: 12.43 K/UL (ref 3.9–12.7)

## 2019-11-17 PROCEDURE — 93010 EKG 12-LEAD: ICD-10-PCS | Mod: ,,, | Performed by: INTERNAL MEDICINE

## 2019-11-17 PROCEDURE — 99285 EMERGENCY DEPT VISIT HI MDM: CPT | Mod: 25

## 2019-11-17 PROCEDURE — G0378 HOSPITAL OBSERVATION PER HR: HCPCS

## 2019-11-17 PROCEDURE — 93010 ELECTROCARDIOGRAM REPORT: CPT | Mod: ,,, | Performed by: INTERNAL MEDICINE

## 2019-11-17 PROCEDURE — 25000003 PHARM REV CODE 250: Performed by: EMERGENCY MEDICINE

## 2019-11-17 PROCEDURE — 83036 HEMOGLOBIN GLYCOSYLATED A1C: CPT

## 2019-11-17 PROCEDURE — 63600175 PHARM REV CODE 636 W HCPCS: Performed by: PHYSICIAN ASSISTANT

## 2019-11-17 PROCEDURE — 36415 COLL VENOUS BLD VENIPUNCTURE: CPT

## 2019-11-17 PROCEDURE — 80053 COMPREHEN METABOLIC PANEL: CPT

## 2019-11-17 PROCEDURE — 25000003 PHARM REV CODE 250: Performed by: PHYSICIAN ASSISTANT

## 2019-11-17 PROCEDURE — 84484 ASSAY OF TROPONIN QUANT: CPT

## 2019-11-17 PROCEDURE — 85610 PROTHROMBIN TIME: CPT

## 2019-11-17 PROCEDURE — 85025 COMPLETE CBC W/AUTO DIFF WBC: CPT

## 2019-11-17 PROCEDURE — 63600175 PHARM REV CODE 636 W HCPCS: Performed by: INTERNAL MEDICINE

## 2019-11-17 PROCEDURE — 83880 ASSAY OF NATRIURETIC PEPTIDE: CPT

## 2019-11-17 PROCEDURE — 93005 ELECTROCARDIOGRAM TRACING: CPT

## 2019-11-17 PROCEDURE — 96372 THER/PROPH/DIAG INJ SC/IM: CPT

## 2019-11-17 RX ORDER — ATORVASTATIN CALCIUM 40 MG/1
80 TABLET, FILM COATED ORAL NIGHTLY
Status: DISCONTINUED | OUTPATIENT
Start: 2019-11-17 | End: 2019-11-18 | Stop reason: HOSPADM

## 2019-11-17 RX ORDER — NITROGLYCERIN 0.4 MG/1
0.4 TABLET SUBLINGUAL
Status: DISCONTINUED | OUTPATIENT
Start: 2019-11-17 | End: 2019-11-18 | Stop reason: HOSPADM

## 2019-11-17 RX ORDER — HEPARIN SODIUM 5000 [USP'U]/ML
5000 INJECTION, SOLUTION INTRAVENOUS; SUBCUTANEOUS EVERY 8 HOURS
Status: COMPLETED | OUTPATIENT
Start: 2019-11-17 | End: 2019-11-17

## 2019-11-17 RX ORDER — ATENOLOL 50 MG/1
50 TABLET ORAL DAILY
Status: DISCONTINUED | OUTPATIENT
Start: 2019-11-18 | End: 2019-11-18 | Stop reason: HOSPADM

## 2019-11-17 RX ORDER — SODIUM CHLORIDE 0.9 % (FLUSH) 0.9 %
10 SYRINGE (ML) INJECTION
Status: DISCONTINUED | OUTPATIENT
Start: 2019-11-17 | End: 2019-11-18 | Stop reason: HOSPADM

## 2019-11-17 RX ORDER — AMLODIPINE BESYLATE 5 MG/1
10 TABLET ORAL DAILY
Status: DISCONTINUED | OUTPATIENT
Start: 2019-11-18 | End: 2019-11-18 | Stop reason: HOSPADM

## 2019-11-17 RX ORDER — ACETAMINOPHEN 500 MG
500 TABLET ORAL EVERY 6 HOURS PRN
Status: DISCONTINUED | OUTPATIENT
Start: 2019-11-17 | End: 2019-11-18 | Stop reason: HOSPADM

## 2019-11-17 RX ORDER — NAPROXEN SODIUM 220 MG/1
324 TABLET, FILM COATED ORAL
Status: DISCONTINUED | OUTPATIENT
Start: 2019-11-17 | End: 2019-11-17

## 2019-11-17 RX ORDER — TALC
6 POWDER (GRAM) TOPICAL NIGHTLY PRN
Status: DISCONTINUED | OUTPATIENT
Start: 2019-11-17 | End: 2019-11-18 | Stop reason: HOSPADM

## 2019-11-17 RX ORDER — HYDROCHLOROTHIAZIDE 25 MG/1
25 TABLET ORAL DAILY
Status: DISCONTINUED | OUTPATIENT
Start: 2019-11-18 | End: 2019-11-18 | Stop reason: HOSPADM

## 2019-11-17 RX ORDER — FLUOXETINE HYDROCHLORIDE 20 MG/1
20 CAPSULE ORAL DAILY
Status: DISCONTINUED | OUTPATIENT
Start: 2019-11-18 | End: 2019-11-18 | Stop reason: HOSPADM

## 2019-11-17 RX ORDER — CLONIDINE HYDROCHLORIDE 0.1 MG/1
0.1 TABLET ORAL EVERY 6 HOURS PRN
Status: DISCONTINUED | OUTPATIENT
Start: 2019-11-17 | End: 2019-11-18 | Stop reason: HOSPADM

## 2019-11-17 RX ORDER — INSULIN ASPART 100 [IU]/ML
15 INJECTION, SUSPENSION SUBCUTANEOUS
Status: DISCONTINUED | OUTPATIENT
Start: 2019-11-17 | End: 2019-11-17

## 2019-11-17 RX ORDER — IBUPROFEN 200 MG
24 TABLET ORAL
Status: DISCONTINUED | OUTPATIENT
Start: 2019-11-17 | End: 2019-11-18

## 2019-11-17 RX ORDER — INSULIN ASPART 100 [IU]/ML
0-5 INJECTION, SOLUTION INTRAVENOUS; SUBCUTANEOUS
Status: DISCONTINUED | OUTPATIENT
Start: 2019-11-17 | End: 2019-11-18

## 2019-11-17 RX ORDER — GLUCAGON 1 MG
1 KIT INJECTION
Status: DISCONTINUED | OUTPATIENT
Start: 2019-11-17 | End: 2019-11-18

## 2019-11-17 RX ORDER — IBUPROFEN 200 MG
16 TABLET ORAL
Status: DISCONTINUED | OUTPATIENT
Start: 2019-11-17 | End: 2019-11-18

## 2019-11-17 RX ADMIN — NITROGLYCERIN 1 INCH: 20 OINTMENT TOPICAL at 03:11

## 2019-11-17 RX ADMIN — INSULIN ASPART 1 UNITS: 100 INJECTION, SOLUTION INTRAVENOUS; SUBCUTANEOUS at 09:11

## 2019-11-17 RX ADMIN — HEPARIN SODIUM 5000 UNITS: 5000 INJECTION, SOLUTION INTRAVENOUS; SUBCUTANEOUS at 09:11

## 2019-11-17 RX ADMIN — TICAGRELOR 90 MG: 90 TABLET ORAL at 09:11

## 2019-11-17 RX ADMIN — ATORVASTATIN CALCIUM 80 MG: 40 TABLET, FILM COATED ORAL at 09:11

## 2019-11-17 NOTE — ASSESSMENT & PLAN NOTE
""March 29, 2019:  [facility] and PCI of RCA -  "Patient has serial mid 99% eccentric lesions consistent with plaque rupture site and abnormal EKG findings.... We initially pre-dilated with 3.0 balloon.  We placed a 3.0 by 12 mm resolute kenya stent in the midportion of the vessel.  We overlapped that with a 3.5 x 15 mm resolute kenya stent in the more proximal portion mid RCA.  Good result was achieved with MADINA 3 flow through the vessel.  Lad has a mid 90% stenosis and the left circumflex as well as the long 95% lesion as well."  Continue home brilinta/statin. Not on aspirin due to allergy.     "

## 2019-11-17 NOTE — HOSPITAL COURSE
Lorena Contreras 69 y.o. female placed in observation for chest pain. In the ED, EKG without acute ischemia, troponin negative, chest x-ray without acute process. Cardiology consulted. On 11/18/19,no chest pain overnight. LHC via Right fem art  showed :EDP 16, LAD mid 50%, Cx long mid 80-90% - diffusely diseased - similar to LHC 3/29/19, OM1 90% mid, RCA stents patent 50% beyond stents. Post procedure, no complications and HDS. Added Imdur and continue to Brilinta/statin. Allergy to ASA. Consider add ACEI/ARBS if blood pressure tolerates. Follow up with Dr. Rico 12/9/19.

## 2019-11-17 NOTE — SUBJECTIVE & OBJECTIVE
Past Medical History:   Diagnosis Date    Anxiety     Colon polyps     Coronary artery disease     Depression     Diabetes mellitus, type II     Fibromyalgia     Follicular lymphoma     HTN (hypertension)     MI (myocardial infarction) 03/2019    Restless leg syndrome        Past Surgical History:   Procedure Laterality Date    COLONOSCOPY  11/07/2012    Colon polyp found; repeat in 5 years    ELBOW SURGERY Right     dislocation repair     ESOPHAGOGASTRODUODENOSCOPY  11/07/2012    atrophic gastritis, H pylori testing negative    KNEE SURGERY Bilateral     scoped    LEFT HEART CATHETERIZATION Left 3/29/2019    Procedure: Left heart cath;  Surgeon: Bladimir Barbosa MD;  Location: [facility] CATH LAB;  Service: Cardiology;  Laterality: Left;       Review of patient's allergies indicates:   Allergen Reactions    Novolin 70/30 (semi-synthetic) Nausea And Vomiting     Severe vomiting on Relion 70/30    Shellfish containing products Swelling    Sulfa (sulfonamide antibiotics) Anaphylaxis    Victoza [liraglutide] Nausea And Vomiting    Glipizide Nausea Only    Asa [aspirin]     Citric acid     Codeine     Egg derived     Iodine and iodide containing products     Rituxan [rituximab]     Soy     Talwin [pentazocine lactate]     Zoloft [sertraline]        No current facility-administered medications on file prior to encounter.      Current Outpatient Medications on File Prior to Encounter   Medication Sig    amLODIPine (NORVASC) 10 MG tablet TAKE ONE TABLET BY MOUTH ONCE DAILY    atenolol (TENORMIN) 50 MG tablet Take 1 tablet (50 mg total) by mouth once daily.    atorvastatin (LIPITOR) 80 MG tablet Take 1 tablet (80 mg total) by mouth every evening.    cholecalciferol, vitamin D3, (VITAMIN D3) 2,000 unit Cap Take 2 capsules by mouth 2 (two) times daily.    esomeprazole (NEXIUM) 20 MG capsule Take 20 mg by mouth before breakfast.    hydroCHLOROthiazide (HYDRODIURIL) 25 MG tablet TAKE 1 TABLET BY  "MOUTH ONCE DAILY    insulin aspart protamine-insulin aspart (NOVOLOG MIX 70-30FLEXPEN U-100) 100 unit/mL (70-30) InPn pen Inject 30 Units into the skin 2 (two) times daily before meals.    JANUVIA 100 mg Tab TAKE 1 TABLET BY MOUTH ONCE DAILY    magnesium oxide (MAG-OX) 400 mg tablet Take 400 mg by mouth once daily.    meclizine (ANTIVERT) 25 mg tablet Take 1 tablet (25 mg total) by mouth 3 (three) times daily as needed.    metFORMIN (GLUCOPHAGE) 1000 MG tablet TAKE 1 TABLET BY MOUTH TWICE DAILY WITH MEALS    pantoprazole (PROTONIX) 40 MG tablet Take 1 tablet (40 mg total) by mouth once daily.    ticagrelor (BRILINTA) 90 mg tablet Take 1 tablet (90 mg total) by mouth 2 (two) times daily.    bismuth tribrom-petrolatum,wh (XEROFORM) 5 X 9 " Bndg Apply dressing to wound daily    blood sugar diagnostic (FREESTYLE TEST) Strp 1 strip by Misc.(Non-Drug; Combo Route) route 4 (four) times daily.    FLUoxetine 20 MG capsule Take 1 capsule (20 mg total) by mouth once daily.    furosemide (LASIX) 20 MG tablet Take 1 tablet (20 mg total) by mouth daily as needed (leg swelling).    hydrOXYzine HCl (ATARAX) 10 MG Tab Take 1 tablet (10 mg total) by mouth 3 (three) times daily as needed.    lancets 32 gauge Misc 1 lancet by Misc.(Non-Drug; Combo Route) route 4 (four) times daily.    nitroGLYCERIN (NITROSTAT) 0.4 MG SL tablet Place 1 tablet (0.4 mg total) under the tongue every 5 (five) minutes as needed for Chest pain.    pen needle, diabetic 32 gauge x 5/32" Ndle Use with Novolog Mix Flexpens    tolnaftate (TINACTIN) 1 % cream Apply topically 2 (two) times daily. (Patient taking differently: Apply topically 2 (two) times daily as needed. )    triamcinolone acetonide 0.1% (KENALOG) 0.1 % cream APPLY  CREAM EXTERNALLY TO AFFECTED AREA TWICE DAILY AS NEEDED     Family History     Problem Relation (Age of Onset)    Cancer Mother, Father    Diabetes Sister    Heart disease Mother    Hypertension Sister        Tobacco Use "    Smoking status: Never Smoker    Smokeless tobacco: Never Used   Substance and Sexual Activity    Alcohol use: No    Drug use: No    Sexual activity: Not Currently     Review of Systems   Constitutional: Negative for chills and fever.   HENT: Negative for nosebleeds and tinnitus.    Eyes: Negative for photophobia and visual disturbance.   Respiratory: Negative for shortness of breath and wheezing.    Cardiovascular: Positive for chest pain. Negative for palpitations and leg swelling.   Gastrointestinal: Negative for abdominal distention, nausea and vomiting.   Genitourinary: Negative for dysuria, flank pain and hematuria.   Musculoskeletal: Negative for gait problem and joint swelling.   Skin: Negative for rash and wound.   Neurological: Negative for seizures and syncope.     Objective:     Vital Signs (Most Recent):  Temp: 98.1 °F (36.7 °C) (11/17/19 1542)  Pulse: 82 (11/17/19 1542)  Resp: 19 (11/17/19 1542)  BP: (!) 159/72 (11/17/19 1542)  SpO2: 99 % (11/17/19 1542) Vital Signs (24h Range):  Temp:  [98.1 °F (36.7 °C)] 98.1 °F (36.7 °C)  Pulse:  [81-93] 82  Resp:  [14-19] 19  SpO2:  [99 %-100 %] 99 %  BP: (136-174)/(67-73) 159/72     Weight: 82.3 kg (181 lb 7 oz)  Body mass index is 26.03 kg/m².    Physical Exam   Constitutional: She is oriented to person, place, and time. She appears well-developed and well-nourished. No distress.   HENT:   Head: Normocephalic and atraumatic.   Eyes: Conjunctivae and EOM are normal. Right eye exhibits no discharge. Left eye exhibits no discharge.   Neck: Normal range of motion. No thyromegaly present.   Cardiovascular: Normal rate and regular rhythm.   No murmur heard.  Pulmonary/Chest: Effort normal and breath sounds normal. No respiratory distress. She exhibits no tenderness.   Abdominal: Soft. Bowel sounds are normal. She exhibits no distension and no mass. There is no tenderness.   Musculoskeletal: She exhibits no edema or deformity.   Neurological: She is alert and  oriented to person, place, and time.   Skin: Skin is warm and dry.   Psychiatric: She has a normal mood and affect. Her behavior is normal.   Nursing note and vitals reviewed.        CRANIAL NERVES     CN III, IV, VI   Extraocular motions are normal.        Significant Labs:   CBC:   Recent Labs   Lab 11/17/19  1407   WBC 12.43   HGB 11.6*   HCT 36.4*        CMP:   Recent Labs   Lab 11/17/19  1407   *   K 4.4      CO2 20*   *   BUN 24*   CREATININE 1.2   CALCIUM 10.1   PROT 7.4   ALBUMIN 3.9   BILITOT 0.6   ALKPHOS 118   AST 38   ALT 34   ANIONGAP 13   EGFRNONAA 46*     Cardiac Markers:   Recent Labs   Lab 11/17/19  1407   BNP 75     Coagulation:   Recent Labs   Lab 11/17/19  1407   INR 1.0     Troponin:   Recent Labs   Lab 11/17/19  1407   TROPONINI 0.010       Significant Imaging:   Imaging Results          X-Ray Chest 1 View (Final result)  Result time 11/17/19 14:49:08   Procedure changed from X-Ray Chest PA And Lateral     Final result by Eddie Montilla MD (11/17/19 14:49:08)                 Impression:      1. No acute cardiopulmonary process.      Electronically signed by: Eddie Montilla MD  Date:    11/17/2019  Time:    14:49             Narrative:    EXAMINATION:  XR CHEST 1 VIEW    CLINICAL HISTORY:  Chest Pain;    TECHNIQUE:  Single frontal view of the chest was performed.    COMPARISON:  06/03/2019    FINDINGS:  The cardiomediastinal silhouette is prominent, magnified by technique noting calcification of the aorta..  There is no pleural effusion.  The trachea is midline.  The lungs are symmetrically expanded bilaterally without evidence of acute parenchymal process. No large focal consolidation seen.  There is no pneumothorax.  The osseous structures are remarkable for degenerative changes..

## 2019-11-17 NOTE — ED PROVIDER NOTES
"Encounter Date: 11/17/2019       History     Chief Complaint   Patient presents with    Chest Pain     Pt reports sudden onset of constant midsternal cp that radiates to both arms (burning sensation) x 25 minutes. Pt also c/o dizziness, fatigue, n/v, sob. Pt reports having a "massive heart attack" March 2019.     69 y.o. female Past Medical History:  No date: Anxiety  No date: Colon polyps  No date: Coronary artery disease  No date: Depression  No date: Diabetes mellitus, type II  No date: Follicular lymphoma  No date: HTN (hypertension)  03/2019: MI (myocardial infarction)  No date: Restless leg syndrome     Notes that since 11am she has had stuttering chest pressure. Notes that the pain comes for several minutes then is relieved. No exertional component. She denies n/v, sob, radiation of pain.       4/2019 stress test  · Abnormal myocardial perfusion scan  · There were no arrhythmias during stress.  · There was no ST segment deviation noted during stress.  · The patient reported dizziness and nausea during the stress test.  · There is a small amount of mild, infarct defect(s) in the inferior wall(s),In the typical distribution of the RCA territory.  · LVEF post-stress is 68%  · The EKG portion of this study is negative for myocardial ischemia.        [facility]: 3-19  · Three vessel coronary artery disease.  · Successful PCI for acute myocardial infarction of culprit RCA.  · Prox RCA lesion , 99% stenosed reduced to 0%..  · A STENT RESOLUTE CRISSY [id number] stent was successfully placed at 14 ROGER  · Mid RCA lesion , 95% stenosed reduced to 0%..  · A STENT RESOLUTE CRISSY [id number] stent was successfully placed at 12 ROGER  · Prox Cx to Dist Cx lesion , 95% stenosed.  · Ost 1st Mrg to 1st Mrg lesion , 90% stenosed.  · Diffusely diseased LAD which is small vessel as well     Echo: 3/30/19  · Normal left ventricular systolic function. The estimated ejection fraction is 55%  · Concentric left ventricular hypertrophy.  · Grade II " (moderate) left ventricular diastolic dysfunction consistent with pseudonormalization.  · Elevated left atrial pressure.  · Severe left atrial enlargement.  · The estimated PA systolic pressure is 52 mm Hg  · Pulmonary hypertension present.     [facility]: 4-19  · Abnormal myocardial perfusion scan  · There were no arrhythmias during stress.  · There was no ST segment deviation noted during stress.  · The patient reported dizziness and nausea during the stress test.  · There is a small amount of mild, infarct defect(s) in the inferior wall(s),In the typical distribution of the RCA territory.  · LVEF post-stress is 68%  · The EKG portion of this study is negative for myocardial ischemia.     Holter 7/17/19  · Sinus rhythm with heart rates varying between 82 and 136 bpm with an average of 96 bpm.  · There were very rare PVCs totalling 21 and averaging 0.44 per hour.  · There were very rare PACs totalling 30 and averaging 0.63 per hour.  · The diary was returned blank.     Carotid US 5/6/29  · There is 20-39% right Internal Carotid Stenosis.  · There is 0-19% left Internal Carotid Stenosis.     LE arterial doppler 5/6/19  · Hemodynamically significant greater than 70% lesion in the L SFA proximally  · Moderate greater than 50% plaque noted in the right SFA  · Noncompressible CLEMENT bilaterally     7/3/19 HR has been running in th 100-130 ranges even at rest at rehab  Has on atenolol previously which was switched to metoprolol after MI  Denies significant CP or SOB   sinus tachycardia - old IMI  Increase toprol XL 50 qd  Holter  OV 1 week          Review of patient's allergies indicates:   Allergen Reactions    Novolin 70/30 (semi-synthetic) Nausea And Vomiting     Severe vomiting on Relion 70/30    Shellfish containing products Swelling    Sulfa (sulfonamide antibiotics) Anaphylaxis    Victoza [liraglutide] Nausea And Vomiting    Glipizide Nausea Only    Asa [aspirin]     Citric acid     Codeine     Egg derived      Iodine and iodide containing products     Rituxan [rituximab]     Soy     Talwin [pentazocine lactate]     Zoloft [sertraline]      Past Medical History:   Diagnosis Date    Anxiety     Colon polyps     Coronary artery disease     Depression     Diabetes mellitus, type II     Follicular lymphoma     HTN (hypertension)     MI (myocardial infarction) 03/2019    Restless leg syndrome      Past Surgical History:   Procedure Laterality Date    COLONOSCOPY  11/07/2012    Colon polyp found; repeat in 5 years    ELBOW SURGERY Right     dislocation repair     ESOPHAGOGASTRODUODENOSCOPY  11/07/2012    atrophic gastritis, H pylori testing negative    KNEE SURGERY Bilateral     scoped    LEFT HEART CATHETERIZATION Left 3/29/2019    Procedure: Left heart cath;  Surgeon: Bladimir Barbosa MD;  Location: [facility] CATH LAB;  Service: Cardiology;  Laterality: Left;     Family History   Problem Relation Age of Onset    Cancer Mother     Heart disease Mother     Cancer Father         lung    Diabetes Sister     Hypertension Sister     Stroke Neg Hx     Hyperlipidemia Neg Hx      Social History     Tobacco Use    Smoking status: Never Smoker    Smokeless tobacco: Never Used   Substance Use Topics    Alcohol use: No    Drug use: No     Review of Systems   Constitutional: Negative for fever.   HENT: Negative for sore throat.    Respiratory: Negative for shortness of breath.    Cardiovascular: Positive for chest pain.   Gastrointestinal: Negative for nausea.   Genitourinary: Negative for dysuria.   Musculoskeletal: Negative for back pain.   Skin: Negative for rash.   Neurological: Negative for weakness.   Hematological: Does not bruise/bleed easily.   All other systems reviewed and are negative.      Physical Exam     Initial Vitals [11/17/19 1338]   BP Pulse Resp Temp SpO2   136/68 93 16 98.1 °F (36.7 °C) 100 %      MAP       --         Physical Exam    Nursing note and vitals reviewed.  Constitutional: She  appears well-developed and well-nourished.   HENT:   Head: Normocephalic and atraumatic.   Eyes: Conjunctivae and EOM are normal. Pupils are equal, round, and reactive to light.   Neck: Normal range of motion.   Cardiovascular: Normal rate and regular rhythm.   Pulmonary/Chest: Breath sounds normal. No respiratory distress.   Abdominal: She exhibits no distension.   Musculoskeletal: Normal range of motion.   Neurological: She is alert. No cranial nerve deficit. GCS score is 15. GCS eye subscore is 4. GCS verbal subscore is 5. GCS motor subscore is 6.   Skin: Skin is warm and dry.   Psychiatric: She has a normal mood and affect. Thought content normal.         ED Course   Procedures  Labs Reviewed   CBC W/ AUTO DIFFERENTIAL   COMPREHENSIVE METABOLIC PANEL   B-TYPE NATRIURETIC PEPTIDE   TROPONIN I   PROTIME-INR     EKG Readings: (Independently Interpreted)   Hr 91, sinus, nl axis/intervals, no lisa/twi, non acute, no stemi       Imaging Results    None             Additional MDM:   Heart Score:    History:          Moderately suspicious.  ECG:             Normal  Age:               >65 years  Risk factors: >= 3 risk factors or history of atherosclerotic disease  Troponin:       Less than or equal to normal limit  Final Score: 5                            Given hx of heart disease with multiple stents and heart score will observe pt to trend c.e.      Clinical Impression:       ICD-10-CM ICD-9-CM   1. Chest pain R07.9 786.50                             Nolan Perry MD  11/17/19 1517

## 2019-11-17 NOTE — Clinical Note
72 ml injected throughout the case. 78 mL total wasted during the case. 150 mL total used in the case.

## 2019-11-17 NOTE — ASSESSMENT & PLAN NOTE
Has known history of CAD, chest pain relieved with nitro in the ED, EKG without acute ischemia, troponin negative, chest x-ray without acute process.  -Troponin trend  -telemetry  -echo  -continue home brilinta/statin  -cardiology consulted  -TSH/A1c/Lipid pending  -prn nitro/ekg

## 2019-11-17 NOTE — ASSESSMENT & PLAN NOTE
Reports episodes of hypoglycemia at home. BG of 190 now start sliding scale insulin, goal -180.   Lab Results   Component Value Date    HGBA1C 9.7 (H) 03/28/2019

## 2019-11-17 NOTE — NURSING
Pt arrived on unit from the ED dept via w/c accompanied per transport. Pt AAOx4 with no c/o pain. Telemetry monitor in place. Saline lock in place to site is clear. Pt  Admission assessment in progress, pt informed of md orders, oriented to environment and safety maintained with bed low side rails up x2 with nurse call bell within reach

## 2019-11-17 NOTE — Clinical Note
Catheter is inserted into the ostium   left main. Angiography performed of the left coronary arteries in multiple views.Hemo Performed.

## 2019-11-17 NOTE — ED TRIAGE NOTES
Pt arrived to ED with c/o intermittent chest pain x this morning that radiates to back, reports bilateral arm tingling. Pt denies sob. Reports MI in march. NAD.

## 2019-11-17 NOTE — H&P
"[facility] Medicine  History & Physical    Patient Name: Lorena Contreras  MRN: [id number]  Admission Date: 11/17/2019  Attending Physician: Rosa Saleem MD   Primary Care Provider: Kenneth Luu MD         Patient information was obtained from patient, past medical records and ER records.     Subjective:     Principal Problem:Chest pain    Chief Complaint:   Chief Complaint   Patient presents with    Chest Pain     Pt reports sudden onset of constant midsternal cp that radiates to both arms (burning sensation) x 25 minutes. Pt also c/o dizziness, fatigue, n/v, sob. Pt reports having a "massive heart attack" March 2019.        HPI: Lorena Contreras 69 y.o. female with CAD, HTN, HLD, DM2 presents to the hospital with a chief complaint of chest pain. She reports today at 11am she developed sternal chest pain without radiation that was constant until relieved with nitro in the ED. She states it was not as severe as previous chest pain when she had her heart attack. She is pain free now. She does not experience exertional chest pain and finds she is able to mow her yard without pain. She does not need her nitro PRN at home. She denies fever, SOB, N/V, abdominal pain, dysuria, dizziness, syncope, hemoptysis.     In the ED, EKG without acute ischemia, troponin negative, chest x-ray without acute process.     Past Medical History:   Diagnosis Date    Anxiety     Colon polyps     Coronary artery disease     Depression     Diabetes mellitus, type II     Fibromyalgia     Follicular lymphoma     HTN (hypertension)     MI (myocardial infarction) 03/2019    Restless leg syndrome        Past Surgical History:   Procedure Laterality Date    COLONOSCOPY  11/07/2012    Colon polyp found; repeat in 5 years    ELBOW SURGERY Right     dislocation repair     ESOPHAGOGASTRODUODENOSCOPY  11/07/2012    atrophic gastritis, H pylori testing negative    KNEE SURGERY Bilateral     scoped "    LEFT HEART CATHETERIZATION Left 3/29/2019    Procedure: Left heart cath;  Surgeon: Bladimir Barbosa MD;  Location: [facility] CATH LAB;  Service: Cardiology;  Laterality: Left;       Review of patient's allergies indicates:   Allergen Reactions    Novolin 70/30 (semi-synthetic) Nausea And Vomiting     Severe vomiting on Relion 70/30    Shellfish containing products Swelling    Sulfa (sulfonamide antibiotics) Anaphylaxis    Victoza [liraglutide] Nausea And Vomiting    Glipizide Nausea Only    Asa [aspirin]     Citric acid     Codeine     Egg derived     Iodine and iodide containing products     Rituxan [rituximab]     Soy     Talwin [pentazocine lactate]     Zoloft [sertraline]        No current facility-administered medications on file prior to encounter.      Current Outpatient Medications on File Prior to Encounter   Medication Sig    amLODIPine (NORVASC) 10 MG tablet TAKE ONE TABLET BY MOUTH ONCE DAILY    atenolol (TENORMIN) 50 MG tablet Take 1 tablet (50 mg total) by mouth once daily.    atorvastatin (LIPITOR) 80 MG tablet Take 1 tablet (80 mg total) by mouth every evening.    cholecalciferol, vitamin D3, (VITAMIN D3) 2,000 unit Cap Take 2 capsules by mouth 2 (two) times daily.    esomeprazole (NEXIUM) 20 MG capsule Take 20 mg by mouth before breakfast.    hydroCHLOROthiazide (HYDRODIURIL) 25 MG tablet TAKE 1 TABLET BY MOUTH ONCE DAILY    insulin aspart protamine-insulin aspart (NOVOLOG MIX 70-30FLEXPEN U-100) 100 unit/mL (70-30) InPn pen Inject 30 Units into the skin 2 (two) times daily before meals.    JANUVIA 100 mg Tab TAKE 1 TABLET BY MOUTH ONCE DAILY    magnesium oxide (MAG-OX) 400 mg tablet Take 400 mg by mouth once daily.    meclizine (ANTIVERT) 25 mg tablet Take 1 tablet (25 mg total) by mouth 3 (three) times daily as needed.    metFORMIN (GLUCOPHAGE) 1000 MG tablet TAKE 1 TABLET BY MOUTH TWICE DAILY WITH MEALS    pantoprazole (PROTONIX) 40 MG tablet Take 1 tablet (40 mg total)  "by mouth once daily.    ticagrelor (BRILINTA) 90 mg tablet Take 1 tablet (90 mg total) by mouth 2 (two) times daily.    bismuth tribrom-petrolatum,wh (XEROFORM) 5 X 9 " Bndg Apply dressing to wound daily    blood sugar diagnostic (FREESTYLE TEST) Strp 1 strip by Misc.(Non-Drug; Combo Route) route 4 (four) times daily.    FLUoxetine 20 MG capsule Take 1 capsule (20 mg total) by mouth once daily.    furosemide (LASIX) 20 MG tablet Take 1 tablet (20 mg total) by mouth daily as needed (leg swelling).    hydrOXYzine HCl (ATARAX) 10 MG Tab Take 1 tablet (10 mg total) by mouth 3 (three) times daily as needed.    lancets 32 gauge Misc 1 lancet by Misc.(Non-Drug; Combo Route) route 4 (four) times daily.    nitroGLYCERIN (NITROSTAT) 0.4 MG SL tablet Place 1 tablet (0.4 mg total) under the tongue every 5 (five) minutes as needed for Chest pain.    pen needle, diabetic 32 gauge x 5/32" Ndle Use with Novolog Mix Flexpens    tolnaftate (TINACTIN) 1 % cream Apply topically 2 (two) times daily. (Patient taking differently: Apply topically 2 (two) times daily as needed. )    triamcinolone acetonide 0.1% (KENALOG) 0.1 % cream APPLY  CREAM EXTERNALLY TO AFFECTED AREA TWICE DAILY AS NEEDED     Family History     Problem Relation (Age of Onset)    Cancer Mother, Father    Diabetes Sister    Heart disease Mother    Hypertension Sister        Tobacco Use    Smoking status: Never Smoker    Smokeless tobacco: Never Used   Substance and Sexual Activity    Alcohol use: No    Drug use: No    Sexual activity: Not Currently     Review of Systems   Constitutional: Negative for chills and fever.   HENT: Negative for nosebleeds and tinnitus.    Eyes: Negative for photophobia and visual disturbance.   Respiratory: Negative for shortness of breath and wheezing.    Cardiovascular: Positive for chest pain. Negative for palpitations and leg swelling.   Gastrointestinal: Negative for abdominal distention, nausea and vomiting. "   Genitourinary: Negative for dysuria, flank pain and hematuria.   Musculoskeletal: Negative for gait problem and joint swelling.   Skin: Negative for rash and wound.   Neurological: Negative for seizures and syncope.     Objective:     Vital Signs (Most Recent):  Temp: 98.1 °F (36.7 °C) (11/17/19 1542)  Pulse: 82 (11/17/19 1542)  Resp: 19 (11/17/19 1542)  BP: (!) 159/72 (11/17/19 1542)  SpO2: 99 % (11/17/19 1542) Vital Signs (24h Range):  Temp:  [98.1 °F (36.7 °C)] 98.1 °F (36.7 °C)  Pulse:  [81-93] 82  Resp:  [14-19] 19  SpO2:  [99 %-100 %] 99 %  BP: (136-174)/(67-73) 159/72     Weight: 82.3 kg (181 lb 7 oz)  Body mass index is 26.03 kg/m².    Physical Exam   Constitutional: She is oriented to person, place, and time. She appears well-developed and well-nourished. No distress.   HENT:   Head: Normocephalic and atraumatic.   Eyes: Conjunctivae and EOM are normal. Right eye exhibits no discharge. Left eye exhibits no discharge.   Neck: Normal range of motion. No thyromegaly present.   Cardiovascular: Normal rate and regular rhythm.   No murmur heard.  Pulmonary/Chest: Effort normal and breath sounds normal. No respiratory distress. She exhibits no tenderness.   Abdominal: Soft. Bowel sounds are normal. She exhibits no distension and no mass. There is no tenderness.   Musculoskeletal: She exhibits no edema or deformity.   Neurological: She is alert and oriented to person, place, and time.   Skin: Skin is warm and dry.   Psychiatric: She has a normal mood and affect. Her behavior is normal.   Nursing note and vitals reviewed.        CRANIAL NERVES     CN III, IV, VI   Extraocular motions are normal.        Significant Labs:   CBC:   Recent Labs   Lab 11/17/19  1407   WBC 12.43   HGB 11.6*   HCT 36.4*        CMP:   Recent Labs   Lab 11/17/19  1407   *   K 4.4      CO2 20*   *   BUN 24*   CREATININE 1.2   CALCIUM 10.1   PROT 7.4   ALBUMIN 3.9   BILITOT 0.6   ALKPHOS 118   AST 38   ALT 34  "  ANIONGAP 13   EGFRNONAA 46*     Cardiac Markers:   Recent Labs   Lab 11/17/19  1407   BNP 75     Coagulation:   Recent Labs   Lab 11/17/19  1407   INR 1.0     Troponin:   Recent Labs   Lab 11/17/19  1407   TROPONINI 0.010       Significant Imaging:   Imaging Results          X-Ray Chest 1 View (Final result)  Result time 11/17/19 14:49:08   Procedure changed from X-Ray Chest PA And Lateral     Final result by Eddie Montilla MD (11/17/19 14:49:08)                 Impression:      1. No acute cardiopulmonary process.      Electronically signed by: Eddie Montilla MD  Date:    11/17/2019  Time:    14:49             Narrative:    EXAMINATION:  XR CHEST 1 VIEW    CLINICAL HISTORY:  Chest Pain;    TECHNIQUE:  Single frontal view of the chest was performed.    COMPARISON:  06/03/2019    FINDINGS:  The cardiomediastinal silhouette is prominent, magnified by technique noting calcification of the aorta..  There is no pleural effusion.  The trachea is midline.  The lungs are symmetrically expanded bilaterally without evidence of acute parenchymal process. No large focal consolidation seen.  There is no pneumothorax.  The osseous structures are remarkable for degenerative changes..                                  Assessment/Plan:     * Chest pain  Has known history of CAD, chest pain relieved with nitro in the ED, EKG without acute ischemia, troponin negative, chest x-ray without acute process.  -Troponin trend  -telemetry  -echo  -continue home brilinta/statin  -cardiology consulted  -TSH/A1c/Lipid pending  -prn nitro/ekg    Coronary artery disease involving native coronary artery of native heart  "March 29, 2019:  C and PCI of RCA -  "Patient has serial mid 99% eccentric lesions consistent with plaque rupture site and abnormal EKG findings.... We initially pre-dilated with 3.0 balloon.  We placed a 3.0 by 12 mm resolute kenya stent in the midportion of the vessel.  We overlapped that with a 3.5 x 15 mm resolute kenya " "stent in the more proximal portion mid RCA.  Good result was achieved with MADINA 3 flow through the vessel.  Lad has a mid 90% stenosis and the left circumflex as well as the long 95% lesion as well."  Continue home brilinta/statin. Not on aspirin due to allergy.       Uncontrolled type 2 diabetes mellitus with diabetic polyneuropathy, with long-term current use of insulin  Reports episodes of hypoglycemia at home. BG of 190 now start sliding scale insulin, goal -180.   Lab Results   Component Value Date    HGBA1C 9.7 (H) 03/28/2019         Hyperlipidemia associated with type 2 diabetes mellitus  Continue home statin      Hypertension associated with diabetes  Fair control. Continue home amlodipine, atenolol, hctz. PRN clonidine    Anxiety  Continue home prozac        VTE Risk Mitigation (From admission, onward)         Ordered     heparin (porcine) injection 5,000 Units  Every 8 hours      11/17/19 1702     IP VTE HIGH RISK PATIENT  Once      11/17/19 1609     Place sequential compression device  Until discontinued      11/17/19 1609              VTE: heparin  Code: full  Diet: diabetic/cardiac  Dispo: pending repeat troponin/cardiology jared White PA-C  Department of Hospital Medicine   [facility]  "

## 2019-11-17 NOTE — HPI
Lorena Contreras 69 y.o. female with CAD, HTN, HLD, DM2 presents to the hospital with a chief complaint of chest pain. She reports today at 11am she developed sternal chest pain without radiation that was constant until relieved with nitro in the ED. She states it was not as severe as previous chest pain when she had her heart attack. She is pain free now. She does not experience exertional chest pain and finds she is able to mow her yard without pain. She does not need her nitro PRN at home. She denies fever, SOB, N/V, abdominal pain, dysuria, dizziness, syncope, hemoptysis.     In the ED, EKG without acute ischemia, troponin negative, chest x-ray without acute process.

## 2019-11-18 VITALS
WEIGHT: 150 LBS | HEIGHT: 70 IN | TEMPERATURE: 98 F | HEART RATE: 77 BPM | BODY MASS INDEX: 21.47 KG/M2 | OXYGEN SATURATION: 99 % | SYSTOLIC BLOOD PRESSURE: 152 MMHG | RESPIRATION RATE: 19 BRPM | DIASTOLIC BLOOD PRESSURE: 67 MMHG

## 2019-11-18 LAB
ANION GAP SERPL CALC-SCNC: 11 MMOL/L (ref 8–16)
ANION GAP SERPL CALC-SCNC: 9 MMOL/L (ref 8–16)
AORTIC ROOT ANNULUS: 2.77 CM
AORTIC VALVE CUSP SEPERATION: 1.48 CM
APTT BLDCRRT: 28.5 SEC (ref 21–32)
ASCENDING AORTA: 2.43 CM
AV INDEX (PROSTH): 0.61
AV MEAN GRADIENT: 10 MMHG
AV PEAK GRADIENT: 16 MMHG
AV VALVE AREA: 1.85 CM2
AV VELOCITY RATIO: 0.62
BSA FOR ECHO PROCEDURE: 1.83 M2
BUN SERPL-MCNC: 20 MG/DL (ref 8–23)
BUN SERPL-MCNC: 22 MG/DL (ref 8–23)
CALCIUM SERPL-MCNC: 10 MG/DL (ref 8.7–10.5)
CALCIUM SERPL-MCNC: 9.9 MG/DL (ref 8.7–10.5)
CHLORIDE SERPL-SCNC: 101 MMOL/L (ref 95–110)
CHLORIDE SERPL-SCNC: 103 MMOL/L (ref 95–110)
CHOLEST SERPL-MCNC: 85 MG/DL (ref 120–199)
CHOLEST/HDLC SERPL: 2.7 {RATIO} (ref 2–5)
CO2 SERPL-SCNC: 25 MMOL/L (ref 23–29)
CO2 SERPL-SCNC: 28 MMOL/L (ref 23–29)
CREAT SERPL-MCNC: 1 MG/DL (ref 0.5–1.4)
CREAT SERPL-MCNC: 1.1 MG/DL (ref 0.5–1.4)
CV ECHO LV RWT: 0.72 CM
DOP CALC AO PEAK VEL: 2.02 M/S
DOP CALC AO VTI: 48.52 CM
DOP CALC LVOT AREA: 3 CM2
DOP CALC LVOT DIAMETER: 1.97 CM
DOP CALC LVOT PEAK VEL: 1.25 M/S
DOP CALC LVOT STROKE VOLUME: 89.96 CM3
DOP CALCLVOT PEAK VEL VTI: 29.53 CM
E WAVE DECELERATION TIME: 209.18 MSEC
E/A RATIO: 0.95
E/E' RATIO: 24.17 M/S
ECHO LV POSTERIOR WALL: 1.34 CM (ref 0.6–1.1)
ERYTHROCYTE [DISTWIDTH] IN BLOOD BY AUTOMATED COUNT: 13.6 % (ref 11.5–14.5)
EST. GFR  (AFRICAN AMERICAN): 59 ML/MIN/1.73 M^2
EST. GFR  (AFRICAN AMERICAN): >60 ML/MIN/1.73 M^2
EST. GFR  (NON AFRICAN AMERICAN): 51 ML/MIN/1.73 M^2
EST. GFR  (NON AFRICAN AMERICAN): 58 ML/MIN/1.73 M^2
ESTIMATED AVG GLUCOSE: 169 MG/DL (ref 68–131)
FRACTIONAL SHORTENING: 30 % (ref 28–44)
GLUCOSE SERPL-MCNC: 207 MG/DL (ref 70–110)
GLUCOSE SERPL-MCNC: 331 MG/DL (ref 70–110)
HBA1C MFR BLD HPLC: 7.5 % (ref 4–5.6)
HCT VFR BLD AUTO: 34.6 % (ref 37–48.5)
HDLC SERPL-MCNC: 31 MG/DL (ref 40–75)
HDLC SERPL: 36.5 % (ref 20–50)
HGB BLD-MCNC: 10.9 G/DL (ref 12–16)
INR PPP: 1 (ref 0.8–1.2)
INTERVENTRICULAR SEPTUM: 1.21 CM (ref 0.6–1.1)
IVRT: 0.09 MSEC
LA MAJOR: 6.37 CM
LA MINOR: 5.52 CM
LA WIDTH: 4.56 CM
LDLC SERPL CALC-MCNC: 24.8 MG/DL (ref 63–159)
LEFT ATRIUM SIZE: 3.79 CM
LEFT ATRIUM VOLUME INDEX: 47 ML/M2
LEFT ATRIUM VOLUME: 86.89 CM3
LEFT INTERNAL DIMENSION IN SYSTOLE: 2.63 CM (ref 2.1–4)
LEFT VENTRICLE DIASTOLIC VOLUME INDEX: 32.33 ML/M2
LEFT VENTRICLE DIASTOLIC VOLUME: 59.72 ML
LEFT VENTRICLE MASS INDEX: 89 G/M2
LEFT VENTRICLE SYSTOLIC VOLUME INDEX: 13.7 ML/M2
LEFT VENTRICLE SYSTOLIC VOLUME: 25.22 ML
LEFT VENTRICULAR INTERNAL DIMENSION IN DIASTOLE: 3.74 CM (ref 3.5–6)
LEFT VENTRICULAR MASS: 164.13 G
LV LATERAL E/E' RATIO: 24.17 M/S
LV SEPTAL E/E' RATIO: 24.17 M/S
MCH RBC QN AUTO: 28.1 PG (ref 27–31)
MCHC RBC AUTO-ENTMCNC: 31.5 G/DL (ref 32–36)
MCV RBC AUTO: 89 FL (ref 82–98)
MV PEAK A VEL: 1.53 M/S
MV PEAK E VEL: 1.45 M/S
NONHDLC SERPL-MCNC: 54 MG/DL
PISA TR MAX VEL: 3.43 M/S
PLATELET # BLD AUTO: 206 K/UL (ref 150–350)
PMV BLD AUTO: 13.1 FL (ref 9.2–12.9)
POCT GLUCOSE: 238 MG/DL (ref 70–110)
POCT GLUCOSE: 327 MG/DL (ref 70–110)
POCT GLUCOSE: 435 MG/DL (ref 70–110)
POTASSIUM SERPL-SCNC: 4.3 MMOL/L (ref 3.5–5.1)
POTASSIUM SERPL-SCNC: 4.4 MMOL/L (ref 3.5–5.1)
PROTHROMBIN TIME: 10.7 SEC (ref 9–12.5)
PULM VEIN S/D RATIO: 1.5
PV PEAK D VEL: 0.48 M/S
PV PEAK S VEL: 0.72 M/S
PV PEAK VELOCITY: 1.17 CM/S
RA MAJOR: 5.95 CM
RA PRESSURE: 3 MMHG
RA WIDTH: 4.38 CM
RBC # BLD AUTO: 3.88 M/UL (ref 4–5.4)
RIGHT VENTRICULAR END-DIASTOLIC DIMENSION: 4.28 CM
RV TISSUE DOPPLER FREE WALL SYSTOLIC VELOCITY 1 (APICAL 4 CHAMBER VIEW): 12.48 CM/S
SINUS: 2.71 CM
SODIUM SERPL-SCNC: 137 MMOL/L (ref 136–145)
SODIUM SERPL-SCNC: 140 MMOL/L (ref 136–145)
STJ: 2.06 CM
TDI LATERAL: 0.06 M/S
TDI SEPTAL: 0.06 M/S
TDI: 0.06 M/S
TR MAX PG: 47 MMHG
TRICUSPID ANNULAR PLANE SYSTOLIC EXCURSION: 2.23 CM
TRIGL SERPL-MCNC: 146 MG/DL (ref 30–150)
TROPONIN I SERPL DL<=0.01 NG/ML-MCNC: 0.01 NG/ML (ref 0–0.03)
TSH SERPL DL<=0.005 MIU/L-ACNC: 0.73 UIU/ML (ref 0.4–4)
TV REST PULMONARY ARTERY PRESSURE: 50 MMHG
WBC # BLD AUTO: 12.04 K/UL (ref 3.9–12.7)

## 2019-11-18 PROCEDURE — 25000003 PHARM REV CODE 250: Performed by: NURSE PRACTITIONER

## 2019-11-18 PROCEDURE — G0378 HOSPITAL OBSERVATION PER HR: HCPCS

## 2019-11-18 PROCEDURE — 93458 L HRT ARTERY/VENTRICLE ANGIO: CPT | Mod: 26,,, | Performed by: INTERNAL MEDICINE

## 2019-11-18 PROCEDURE — 93458 PR CATH PLACE/CORON ANGIO, IMG SUPER/INTERP,W LEFT HEART VENTRICULOGRAPHY: ICD-10-PCS | Mod: 26,,, | Performed by: INTERNAL MEDICINE

## 2019-11-18 PROCEDURE — 63600175 PHARM REV CODE 636 W HCPCS: Performed by: PHYSICIAN ASSISTANT

## 2019-11-18 PROCEDURE — 84484 ASSAY OF TROPONIN QUANT: CPT

## 2019-11-18 PROCEDURE — C1887 CATHETER, GUIDING: HCPCS | Performed by: INTERNAL MEDICINE

## 2019-11-18 PROCEDURE — 84443 ASSAY THYROID STIM HORMONE: CPT

## 2019-11-18 PROCEDURE — 25000003 PHARM REV CODE 250: Performed by: INTERNAL MEDICINE

## 2019-11-18 PROCEDURE — 99220 PR INITIAL OBSERVATION CARE,LEVL III: ICD-10-PCS | Mod: 25,,, | Performed by: INTERNAL MEDICINE

## 2019-11-18 PROCEDURE — 99152 MOD SED SAME PHYS/QHP 5/>YRS: CPT | Performed by: INTERNAL MEDICINE

## 2019-11-18 PROCEDURE — 99220 PR INITIAL OBSERVATION CARE,LEVL III: CPT | Mod: 25,,, | Performed by: INTERNAL MEDICINE

## 2019-11-18 PROCEDURE — 25000003 PHARM REV CODE 250: Performed by: EMERGENCY MEDICINE

## 2019-11-18 PROCEDURE — 25500020 PHARM REV CODE 255: Performed by: INTERNAL MEDICINE

## 2019-11-18 PROCEDURE — 63600175 PHARM REV CODE 636 W HCPCS: Performed by: INTERNAL MEDICINE

## 2019-11-18 PROCEDURE — 80048 BASIC METABOLIC PNL TOTAL CA: CPT

## 2019-11-18 PROCEDURE — 96374 THER/PROPH/DIAG INJ IV PUSH: CPT

## 2019-11-18 PROCEDURE — 80061 LIPID PANEL: CPT

## 2019-11-18 PROCEDURE — C1894 INTRO/SHEATH, NON-LASER: HCPCS | Performed by: INTERNAL MEDICINE

## 2019-11-18 PROCEDURE — 36415 COLL VENOUS BLD VENIPUNCTURE: CPT

## 2019-11-18 PROCEDURE — 99152 PR MOD CONSCIOUS SEDATION, SAME PHYS, 5+ YRS, FIRST 15 MIN: ICD-10-PCS | Mod: ,,, | Performed by: INTERNAL MEDICINE

## 2019-11-18 PROCEDURE — 85730 THROMBOPLASTIN TIME PARTIAL: CPT

## 2019-11-18 PROCEDURE — 93458 L HRT ARTERY/VENTRICLE ANGIO: CPT | Performed by: INTERNAL MEDICINE

## 2019-11-18 PROCEDURE — 85027 COMPLETE CBC AUTOMATED: CPT

## 2019-11-18 PROCEDURE — C1769 GUIDE WIRE: HCPCS | Performed by: INTERNAL MEDICINE

## 2019-11-18 PROCEDURE — 80048 BASIC METABOLIC PNL TOTAL CA: CPT | Mod: 91

## 2019-11-18 PROCEDURE — 96372 THER/PROPH/DIAG INJ SC/IM: CPT | Mod: 59

## 2019-11-18 PROCEDURE — 85610 PROTHROMBIN TIME: CPT

## 2019-11-18 PROCEDURE — 99152 MOD SED SAME PHYS/QHP 5/>YRS: CPT | Mod: ,,, | Performed by: INTERNAL MEDICINE

## 2019-11-18 PROCEDURE — 96361 HYDRATE IV INFUSION ADD-ON: CPT

## 2019-11-18 PROCEDURE — 25000003 PHARM REV CODE 250: Performed by: PHYSICIAN ASSISTANT

## 2019-11-18 RX ORDER — ISOSORBIDE MONONITRATE 30 MG/1
30 TABLET, EXTENDED RELEASE ORAL DAILY
Qty: 30 TABLET | Refills: 11 | Status: SHIPPED | OUTPATIENT
Start: 2019-11-18 | End: 2020-06-09 | Stop reason: SDUPTHER

## 2019-11-18 RX ORDER — FENTANYL CITRATE 50 UG/ML
INJECTION, SOLUTION INTRAMUSCULAR; INTRAVENOUS
Status: DISCONTINUED | OUTPATIENT
Start: 2019-11-18 | End: 2019-11-18 | Stop reason: HOSPADM

## 2019-11-18 RX ORDER — IBUPROFEN 200 MG
16 TABLET ORAL
Status: DISCONTINUED | OUTPATIENT
Start: 2019-11-18 | End: 2019-11-18 | Stop reason: HOSPADM

## 2019-11-18 RX ORDER — SODIUM CHLORIDE 9 MG/ML
INJECTION, SOLUTION INTRAVENOUS CONTINUOUS
Status: DISCONTINUED | OUTPATIENT
Start: 2019-11-18 | End: 2019-11-18

## 2019-11-18 RX ORDER — MIDAZOLAM HYDROCHLORIDE 1 MG/ML
INJECTION, SOLUTION INTRAMUSCULAR; INTRAVENOUS
Status: DISCONTINUED | OUTPATIENT
Start: 2019-11-18 | End: 2019-11-18 | Stop reason: HOSPADM

## 2019-11-18 RX ORDER — ONDANSETRON 4 MG/1
4 TABLET, ORALLY DISINTEGRATING ORAL ONCE
Status: COMPLETED | OUTPATIENT
Start: 2019-11-18 | End: 2019-11-18

## 2019-11-18 RX ORDER — LIDOCAINE HYDROCHLORIDE 10 MG/ML
INJECTION, SOLUTION EPIDURAL; INFILTRATION; INTRACAUDAL; PERINEURAL
Status: DISCONTINUED | OUTPATIENT
Start: 2019-11-18 | End: 2019-11-18 | Stop reason: HOSPADM

## 2019-11-18 RX ORDER — SODIUM CHLORIDE 9 MG/ML
INJECTION, SOLUTION INTRAVENOUS CONTINUOUS
Status: ACTIVE | OUTPATIENT
Start: 2019-11-18 | End: 2019-11-18

## 2019-11-18 RX ORDER — NITROGLYCERIN 0.4 MG/1
0.4 TABLET SUBLINGUAL EVERY 5 MIN PRN
Status: DISCONTINUED | OUTPATIENT
Start: 2019-11-18 | End: 2019-11-18 | Stop reason: HOSPADM

## 2019-11-18 RX ORDER — ACETAMINOPHEN 325 MG/1
650 TABLET ORAL EVERY 4 HOURS PRN
Status: DISCONTINUED | OUTPATIENT
Start: 2019-11-18 | End: 2019-11-18 | Stop reason: HOSPADM

## 2019-11-18 RX ORDER — DIPHENHYDRAMINE HCL 50 MG
50 CAPSULE ORAL ONCE
Status: COMPLETED | OUTPATIENT
Start: 2019-11-18 | End: 2019-11-18

## 2019-11-18 RX ORDER — INSULIN ASPART 100 [IU]/ML
1-10 INJECTION, SOLUTION INTRAVENOUS; SUBCUTANEOUS
Status: DISCONTINUED | OUTPATIENT
Start: 2019-11-18 | End: 2019-11-18 | Stop reason: HOSPADM

## 2019-11-18 RX ORDER — ISOSORBIDE MONONITRATE 30 MG/1
30 TABLET, EXTENDED RELEASE ORAL DAILY
Status: DISCONTINUED | OUTPATIENT
Start: 2019-11-18 | End: 2019-11-18 | Stop reason: HOSPADM

## 2019-11-18 RX ORDER — HYDROCODONE BITARTRATE AND ACETAMINOPHEN 5; 325 MG/1; MG/1
1 TABLET ORAL EVERY 4 HOURS PRN
Status: DISCONTINUED | OUTPATIENT
Start: 2019-11-18 | End: 2019-11-18

## 2019-11-18 RX ORDER — IBUPROFEN 200 MG
24 TABLET ORAL
Status: DISCONTINUED | OUTPATIENT
Start: 2019-11-18 | End: 2019-11-18 | Stop reason: HOSPADM

## 2019-11-18 RX ORDER — TRAMADOL HYDROCHLORIDE 50 MG/1
50 TABLET ORAL EVERY 6 HOURS PRN
Status: DISCONTINUED | OUTPATIENT
Start: 2019-11-18 | End: 2019-11-18 | Stop reason: HOSPADM

## 2019-11-18 RX ORDER — GLUCAGON 1 MG
1 KIT INJECTION
Status: DISCONTINUED | OUTPATIENT
Start: 2019-11-18 | End: 2019-11-18 | Stop reason: HOSPADM

## 2019-11-18 RX ADMIN — ATORVASTATIN CALCIUM 80 MG: 40 TABLET, FILM COATED ORAL at 08:11

## 2019-11-18 RX ADMIN — SODIUM CHLORIDE: 0.9 INJECTION, SOLUTION INTRAVENOUS at 11:11

## 2019-11-18 RX ADMIN — INSULIN ASPART 4 UNITS: 100 INJECTION, SOLUTION INTRAVENOUS; SUBCUTANEOUS at 11:11

## 2019-11-18 RX ADMIN — DIPHENHYDRAMINE HYDROCHLORIDE 50 MG: 50 CAPSULE ORAL at 09:11

## 2019-11-18 RX ADMIN — HYDROCORTISONE SODIUM SUCCINATE 100 MG: 100 INJECTION, POWDER, FOR SOLUTION INTRAMUSCULAR; INTRAVENOUS at 09:11

## 2019-11-18 RX ADMIN — TRAMADOL HYDROCHLORIDE 50 MG: 50 TABLET, FILM COATED ORAL at 04:11

## 2019-11-18 RX ADMIN — ISOSORBIDE MONONITRATE 30 MG: 30 TABLET, EXTENDED RELEASE ORAL at 05:11

## 2019-11-18 RX ADMIN — TICAGRELOR 90 MG: 90 TABLET ORAL at 08:11

## 2019-11-18 RX ADMIN — ATENOLOL 50 MG: 50 TABLET ORAL at 08:11

## 2019-11-18 RX ADMIN — AMLODIPINE BESYLATE 10 MG: 5 TABLET ORAL at 08:11

## 2019-11-18 RX ADMIN — ACETAMINOPHEN 500 MG: 500 TABLET ORAL at 10:11

## 2019-11-18 RX ADMIN — ONDANSETRON 4 MG: 4 TABLET, ORALLY DISINTEGRATING ORAL at 10:11

## 2019-11-18 RX ADMIN — HYDROCHLOROTHIAZIDE 25 MG: 25 TABLET ORAL at 08:11

## 2019-11-18 RX ADMIN — FLUOXETINE 20 MG: 20 CAPSULE ORAL at 08:11

## 2019-11-18 RX ADMIN — INSULIN ASPART 5 UNITS: 100 INJECTION, SOLUTION INTRAVENOUS; SUBCUTANEOUS at 08:11

## 2019-11-18 RX ADMIN — Medication 6 MG: at 12:11

## 2019-11-18 NOTE — CONSULTS
[facility]  Cardiology  Consult Note    Patient Name: Lorena Contreras  MRN: [id number]  Admission Date: 11/17/2019  Hospital Length of Stay: 0 days  Code Status: Full Code   Attending Provider: Rosa Saleem MD   Consulting Provider: Karl Rico MD  Primary Care Physician: Kenneth Luu MD  Principal Problem:Chest pain    Patient information was obtained from patient and ER records.     Consults  Subjective:     Chief Complaint:  CAD     HPI: Lorena Contreras 69 y.o. female with CAD, HTN, HLD, DM2 presents to the hospital with a chief complaint of chest pain. She reports today at 11am she developed sternal chest pain without radiation that was constant until relieved with nitro in the ED. She states it was not as severe as previous chest pain when she had her heart attack. She is pain free now. She does not experience exertional chest pain and finds she is able to mow her yard without pain. She does not need her nitro PRN at home. She denies fever, SOB, N/V, abdominal pain, dysuria, dizziness, syncope, hemoptysis.     Currently CP free  Troponin negative  EKG NSR NSSTT changes    Known to me  Previously followed by Dr Barbosa - last seen 5/16/19  Patient is here for follow-up of coronary artery disease and ST-elevation MI.  She underwent PCI to the RCA.  She is known to have other blockages well for which she re-presented to the emergency department was admitted for observation for atypical chest pain.  She felt like it was anxiety related and she no longer has had any since discharge.  She underwent nuclear stress test which showed no significant ischemia.  She denies any other associated symptoms currently.  She has experienced no PND, orthopnea or lower extremity edema.  She denies any dizziness, presyncope or syncope.  She says she was given Atarax on discharge which has helped with her anxiety.     Today:  Her follow-up coronary artery disease and previous ST-elevation MI.   She is feeling anxious and feels like she has a panic attack today.  She again is somewhat tearful and says that her  is been feeling ill as well.  She says she was started on Prozac by her primary care physician but does not feel like this is doing much in terms of calming her down.  She still has some mild residual chest pain when she mostly feels anxious.  This is dissimilar to her previous angina symptoms.  She denies any other associated symptoms.  She denies any PND, orthopnea or lower extremity edema.  She denies any dizziness, presyncope or syncope.       LHC: 3-19  · Three vessel coronary artery disease.  · Successful PCI for acute myocardial infarction of culprit RCA.  · Prox RCA lesion , 99% stenosed reduced to 0%..  · A STENT RESOLUTE CRISSY [id number] stent was successfully placed at 14 ROGER  · Mid RCA lesion , 95% stenosed reduced to 0%..  · A STENT RESOLUTE CRISSY 3.0X12MM stent was successfully placed at 12 ROGER  · Prox Cx to Dist Cx lesion , 95% stenosed.  · Ost 1st Mrg to 1st Mrg lesion , 90% stenosed.  · Diffusely diseased LAD which is small vessel as well     Echo: 3/30/19  · Normal left ventricular systolic function. The estimated ejection fraction is 55%  · Concentric left ventricular hypertrophy.  · Grade II (moderate) left ventricular diastolic dysfunction consistent with pseudonormalization.  · Elevated left atrial pressure.  · Severe left atrial enlargement.  · The estimated PA systolic pressure is 52 mm Hg  · Pulmonary hypertension present.     NST: 4-19  · Abnormal myocardial perfusion scan  · There were no arrhythmias during stress.  · There was no ST segment deviation noted during stress.  · The patient reported dizziness and nausea during the stress test.  · There is a small amount of mild, infarct defect(s) in the inferior wall(s),In the typical distribution of the RCA territory.  · LVEF post-stress is 68%  · The EKG portion of this study is negative for myocardial  ischemia.     Holter 7/17/19  · Sinus rhythm with heart rates varying between 82 and 136 bpm with an average of 96 bpm.  · There were very rare PVCs totalling 21 and averaging 0.44 per hour.  · There were very rare PACs totalling 30 and averaging 0.63 per hour.  · The diary was returned blank.     Carotid US 5/6/29  · There is 20-39% right Internal Carotid Stenosis.  · There is 0-19% left Internal Carotid Stenosis.      LE arterial doppler 5/6/19  · Hemodynamically significant greater than 70% lesion in the L SFA proximally  · Moderate greater than 50% plaque noted in the right SFA  · Noncompressible CLEMENT bilaterally     7/3/19 HR has been running in th 100-130 ranges even at rest at rehab  Has on atenolol previously which was switched to metoprolol after MI  Denies significant CP or SOB   sinus tachycardia - old IMI  Increase toprol XL 50 qd  Holter  OV 1 week     9/4/19 Says metoprolol gives her diarrhea - wants to go back to atenolol  Denies CP or SOB  Mild stable claudication            Past Medical History:   Diagnosis Date    Anxiety     Colon polyps     Coronary artery disease     Depression     Diabetes mellitus, type II     Fibromyalgia     Follicular lymphoma     HTN (hypertension)     MI (myocardial infarction) 03/2019    Restless leg syndrome        Past Surgical History:   Procedure Laterality Date    COLONOSCOPY  11/07/2012    Colon polyp found; repeat in 5 years    ELBOW SURGERY Right     dislocation repair     ESOPHAGOGASTRODUODENOSCOPY  11/07/2012    atrophic gastritis, H pylori testing negative    KNEE SURGERY Bilateral     scoped    LEFT HEART CATHETERIZATION Left 3/29/2019    Procedure: Left heart cath;  Surgeon: Bladimir Barbosa MD;  Location: [facility] CATH LAB;  Service: Cardiology;  Laterality: Left;       Review of patient's allergies indicates:   Allergen Reactions    Novolin 70/30 (semi-synthetic) Nausea And Vomiting     Severe vomiting on Relion 70/30    Shellfish  "containing products Swelling    Sulfa (sulfonamide antibiotics) Anaphylaxis    Victoza [liraglutide] Nausea And Vomiting    Glipizide Nausea Only    Asa [aspirin] Hives    Citric acid     Codeine     Egg derived     Iodine and iodide containing products     Rituxan [rituximab]     Soy     Talwin [pentazocine lactate]     Zoloft [sertraline]        No current facility-administered medications on file prior to encounter.      Current Outpatient Medications on File Prior to Encounter   Medication Sig    amLODIPine (NORVASC) 10 MG tablet TAKE ONE TABLET BY MOUTH ONCE DAILY    atenolol (TENORMIN) 50 MG tablet Take 1 tablet (50 mg total) by mouth once daily.    atorvastatin (LIPITOR) 80 MG tablet Take 1 tablet (80 mg total) by mouth every evening.    cholecalciferol, vitamin D3, (VITAMIN D3) 2,000 unit Cap Take 2 capsules by mouth 2 (two) times daily.    esomeprazole (NEXIUM) 20 MG capsule Take 20 mg by mouth before breakfast.    hydroCHLOROthiazide (HYDRODIURIL) 25 MG tablet TAKE 1 TABLET BY MOUTH ONCE DAILY    insulin aspart protamine-insulin aspart (NOVOLOG MIX 70-30FLEXPEN U-100) 100 unit/mL (70-30) InPn pen Inject 30 Units into the skin 2 (two) times daily before meals.    JANUVIA 100 mg Tab TAKE 1 TABLET BY MOUTH ONCE DAILY    magnesium oxide (MAG-OX) 400 mg tablet Take 400 mg by mouth once daily.    meclizine (ANTIVERT) 25 mg tablet Take 1 tablet (25 mg total) by mouth 3 (three) times daily as needed.    metFORMIN (GLUCOPHAGE) 1000 MG tablet TAKE 1 TABLET BY MOUTH TWICE DAILY WITH MEALS    pantoprazole (PROTONIX) 40 MG tablet Take 1 tablet (40 mg total) by mouth once daily.    ticagrelor (BRILINTA) 90 mg tablet Take 1 tablet (90 mg total) by mouth 2 (two) times daily.    bismuth tribrom-petrolatum,wh (XEROFORM) 5 X 9 " Bndg Apply dressing to wound daily    blood sugar diagnostic (FREESTYLE TEST) Strp 1 strip by Misc.(Non-Drug; Combo Route) route 4 (four) times daily.    FLUoxetine 20 MG " "capsule Take 1 capsule (20 mg total) by mouth once daily.    furosemide (LASIX) 20 MG tablet Take 1 tablet (20 mg total) by mouth daily as needed (leg swelling).    hydrOXYzine HCl (ATARAX) 10 MG Tab Take 1 tablet (10 mg total) by mouth 3 (three) times daily as needed.    lancets 32 gauge Misc 1 lancet by Misc.(Non-Drug; Combo Route) route 4 (four) times daily.    nitroGLYCERIN (NITROSTAT) 0.4 MG SL tablet Place 1 tablet (0.4 mg total) under the tongue every 5 (five) minutes as needed for Chest pain.    pen needle, diabetic 32 gauge x 5/32" Ndle Use with Novolog Mix Flexpens    tolnaftate (TINACTIN) 1 % cream Apply topically 2 (two) times daily. (Patient taking differently: Apply topically 2 (two) times daily as needed. )    triamcinolone acetonide 0.1% (KENALOG) 0.1 % cream APPLY  CREAM EXTERNALLY TO AFFECTED AREA TWICE DAILY AS NEEDED     Family History     Problem Relation (Age of Onset)    Cancer Mother, Father    Diabetes Sister    Heart disease Mother    Hypertension Sister        Tobacco Use    Smoking status: Never Smoker    Smokeless tobacco: Never Used   Substance and Sexual Activity    Alcohol use: No    Drug use: No    Sexual activity: Not Currently     Review of Systems   Constitution: Negative for decreased appetite.   HENT: Negative for ear discharge.    Eyes: Negative for blurred vision.   Respiratory: Negative for hemoptysis.    Endocrine: Negative for polyphagia.   Hematologic/Lymphatic: Negative for adenopathy.   Skin: Negative for color change.   Musculoskeletal: Negative for joint swelling.   Genitourinary: Negative for bladder incontinence.   Neurological: Negative for brief paralysis.   Psychiatric/Behavioral: Negative for hallucinations.   Allergic/Immunologic: Negative for hives.     Objective:     Vital Signs (Most Recent):  Temp: 98.1 °F (36.7 °C) (11/18/19 0743)  Pulse: 73 (11/18/19 0743)  Resp: 16 (11/18/19 0743)  BP: (!) 158/68 (11/18/19 0743)  SpO2: 97 % (11/18/19 0743) " Vital Signs (24h Range):  Temp:  [97.9 °F (36.6 °C)-98.9 °F (37.2 °C)] 98.1 °F (36.7 °C)  Pulse:  [73-93] 73  Resp:  [14-19] 16  SpO2:  [97 %-100 %] 97 %  BP: (136-174)/(64-73) 158/68     Weight: 68.2 kg (150 lb 5.7 oz)  Body mass index is 21.57 kg/m².    SpO2: 97 %  O2 Device (Oxygen Therapy): room air      Intake/Output Summary (Last 24 hours) at 11/18/2019 0834  Last data filed at 11/18/2019 0011  Gross per 24 hour   Intake 315 ml   Output --   Net 315 ml       Lines/Drains/Airways     Peripheral Intravenous Line                 Peripheral IV - Single Lumen 11/18/19 0040 22 G Left Forearm less than 1 day                Physical Exam   Constitutional: She is oriented to person, place, and time. She appears well-developed and well-nourished.   HENT:   Head: Normocephalic and atraumatic.   Eyes: Pupils are equal, round, and reactive to light. Conjunctivae are normal.   Neck: Normal range of motion. Neck supple.   Cardiovascular: Normal rate, normal heart sounds and intact distal pulses.   Pulmonary/Chest: Effort normal and breath sounds normal.   Abdominal: Soft. Bowel sounds are normal.   Musculoskeletal: Normal range of motion.   Neurological: She is alert and oriented to person, place, and time.   Skin: Skin is warm and dry.       Significant Labs: All pertinent lab results from the last 24 hours have been reviewed.    Significant Imaging: Echocardiogram:   2D echo with color flow doppler:   Results for orders placed or performed during the hospital encounter of 12/16/15   2D Echo w/ Color Flow Doppler   Result Value Ref Range    QEF 55 55 - 65    Mitral Valve Regurgitation TRIVIAL     Diastolic Dysfunction Yes (A)     Est. PA Systolic Pressure 36.48     Tricuspid Valve Regurgitation MILD     Narrative    TEST DESCRIPTION   Technical Quality: This is a technically adequate study.     Aorta: The aortic root is normal in size, measuring 2.6 cm at sinotubular junction and 2.5 cm at Sinuses of Valsalva. The proximal  ascending aorta is normal in size, measuring 2.8 cm across.     Left Atrium: The left atrial volume index is severely enlarged, measuring 48.37 cc/m2.     Left Ventricle: The left ventricle is normal in size, with an end-diastolic diameter of 4.3 cm, and an end-systolic diameter of 3.1 cm. LV wall thickness is normal, with the septum measuring 1.1 cm and the posterior wall measuring 1.0 cm across. Relative   wall thickness was increased at 0.47, and the LV mass index was 88.2 g/m2 consistent with concentric remodeling. Global left ventricular systolic function appears normal. Visually estimated ejection fraction is 55-60%. The LV Doppler derived stroke   volume equals 82.0 ccs.   The E/e'(lat) is 12, consistent with significant diastolic dysfunction.     Right Atrium: The right atrium is normal in size, measuring 5.0 cm in length and 3.3 cm in width in the apical view.     Right Ventricle: The right ventricle is normal in size measuring 2.6 cm at the base in the apical right ventricle-focused view. Global right ventricular systolic function appears normal. Tricuspid annular plane systolic excursion (TAPSE) is 2.3 cm. The   estimated RV systolic pressure is greater than 36 mmHg.     Aortic Valve:  The aortic valve is mildly sclerotic.     Mitral Valve:  There is trivial mitral regurgitation. There is mitral annular calcification. Mitral valve is normal in structure with normal leaflet mobility.     Tricuspid Valve:  There is mild tricuspid regurgitation. Tricuspid valve is normal in structure with normal leaflet mobility.     Pulmonary Valve:  The pulmonic valve is not well seen.     IVC: The IVC is not visualized.     Intracavitary: There is no evidence of pericardial effusion, intracavity mass, thrombi, or vegetation.     Normal regional wall motion..         CONCLUSIONS     1 - Normal left ventricular systolic function (EF 55-60%).  Normal regional wall motion.     2 - Concentric remodeling.     3 - Trivial mitral  regurgitation.     4 - Mild tricuspid regurgitation.     5 - The estimated RV systolic pressure is greater than 36 mmHg.         This document has been electronically    SIGNED BY: Greg Keith MD On: 12/16/2015 11:40     Assessment and Plan:     Coronary artery disease involving native coronary artery of native heart  Ruled out for MI but CP typical of previous angina. Discussed medical Rx versus LHC - patient agreeable to LHC. On brilinta. Allergic to ASA - hives. Hx RCA stents x 2 3/2019 - at that time mid Cx noted to be diffusely diseased, 90% RI. Prophylaxis for iodine allergy    Uncontrolled type 2 diabetes mellitus with diabetic polyneuropathy, with long-term current use of insulin  Per primary    Hyperlipidemia associated with type 2 diabetes mellitus  On statin    Hypertension associated with diabetes  stable        VTE Risk Mitigation (From admission, onward)         Ordered     IP VTE HIGH RISK PATIENT  Once      11/17/19 1609     Place sequential compression device  Until discontinued      11/17/19 1609                Thank you for your consult. I will follow-up with patient. Please contact us if you have any additional questions.    Karl Rico MD  Cardiology   [facility]

## 2019-11-18 NOTE — PROCEDURES
Procedures     [facility]   Dr Rico  Pre-op Dx UA  Post-op Dx same  Specimen none  EBL < 50 cc    11/18/19 [facility] - EDP 16, LAD mid 50%, Cx long mid 80-90% - diffusely diseased - similar to [facility] 3/29/19, OM1 90% mid, RCA stents patent 50% beyond stents    Will review with interventional staff for possible out patient PCI - continue with medical Rx for now  Ok for d/c later today

## 2019-11-18 NOTE — ASSESSMENT & PLAN NOTE
Goal -180.   Lab Results   Component Value Date    HGBA1C 7.5 (H) 11/17/2019   Basal 10 u; SSI  and add prandial accordingly

## 2019-11-18 NOTE — ASSESSMENT & PLAN NOTE
Ruled out for MI but CP typical of previous angina. Discussed medical Rx versus LHC - patient agreeable to LHC. On brilinta. Allergic to ASA - hives. Hx RCA stents x 2 3/2019 - at that time mid Cx noted to be diffusely diseased, 90% RI. Prophylaxis for iodine allergy

## 2019-11-18 NOTE — PROGRESS NOTES
[facility] Hospital Medicine  Progress Note    Patient Name: Lorena Contreras  MRN: [id number]  Patient Class: OP- Observation   Admission Date: 11/17/2019  Length of Stay: 0 days  Attending Physician: Rosa Saleem MD  Primary Care Provider: Kenneth Luu MD        Subjective:     Principal Problem:Chest pain        HPI:  Lorena Contreras 69 y.o. female with CAD, HTN, HLD, DM2 presents to the hospital with a chief complaint of chest pain. She reports today at 11am she developed sternal chest pain without radiation that was constant until relieved with nitro in the ED. She states it was not as severe as previous chest pain when she had her heart attack. She is pain free now. She does not experience exertional chest pain and finds she is able to mow her yard without pain. She does not need her nitro PRN at home. She denies fever, SOB, N/V, abdominal pain, dysuria, dizziness, syncope, hemoptysis.     In the ED, EKG without acute ischemia, troponin negative, chest x-ray without acute process.     Overview/Hospital Course:  Lorena Contreras 69 y.o. female placed in observation for chest pain. In the ED, EKG without acute ischemia, troponin negative, chest x-ray without acute process. Cardiology consulted. On 11/18/19,no chest pain overnight. Plan for [facility] today 11/18/19. Continue to Brilinta/statin. Allergy to ASA.       Interval History: Denies chest pain     Review of Systems   Constitutional: Negative for chills and fever.   HENT: Negative for nosebleeds and tinnitus.    Eyes: Negative for photophobia and visual disturbance.   Respiratory: Negative for shortness of breath and wheezing.    Cardiovascular: Negative for palpitations and leg swelling. Chest pain: resolved.   Gastrointestinal: Negative for abdominal distention, nausea and vomiting.   Genitourinary: Negative for dysuria, flank pain and hematuria.   Musculoskeletal: Negative for gait problem and joint swelling.   Skin:  Negative for rash and wound.   Neurological: Negative for seizures and syncope.     Objective:     Vital Signs (Most Recent):  Temp: 98.1 °F (36.7 °C) (11/18/19 0743)  Pulse: 73 (11/18/19 0743)  Resp: 16 (11/18/19 0743)  BP: (!) 158/68 (11/18/19 0743)  SpO2: 97 % (11/18/19 0743) Vital Signs (24h Range):  Temp:  [97.9 °F (36.6 °C)-98.9 °F (37.2 °C)] 98.1 °F (36.7 °C)  Pulse:  [73-93] 73  Resp:  [14-19] 16  SpO2:  [97 %-100 %] 97 %  BP: (136-174)/(64-73) 158/68     Weight: 68.2 kg (150 lb 5.7 oz)  Body mass index is 21.57 kg/m².    Intake/Output Summary (Last 24 hours) at 11/18/2019 0923  Last data filed at 11/18/2019 0011  Gross per 24 hour   Intake 315 ml   Output --   Net 315 ml      Physical Exam   Constitutional: She is oriented to person, place, and time. She appears well-developed and well-nourished. No distress.   HENT:   Head: Normocephalic and atraumatic.   Eyes: Conjunctivae and EOM are normal. Right eye exhibits no discharge. Left eye exhibits no discharge.   Neck: Normal range of motion. No thyromegaly present.   Cardiovascular: Normal rate and regular rhythm.   No murmur heard.  Pulmonary/Chest: Effort normal and breath sounds normal. No respiratory distress. She exhibits no tenderness.   Abdominal: Soft. Bowel sounds are normal. She exhibits no distension and no mass. There is no tenderness.   Musculoskeletal: She exhibits no edema or deformity.   Neurological: She is alert and oriented to person, place, and time.   Skin: Skin is warm and dry.   Psychiatric: She has a normal mood and affect. Her behavior is normal.   Nursing note and vitals reviewed.      Significant Labs: All pertinent labs within the past 24 hours have been reviewed.    Significant Imaging: I have reviewed and interpreted all pertinent imaging results/findings within the past 24 hours.      Assessment/Plan:      * Chest pain  Has known history of CAD with hx RCA stent x 2 to RCA. [facility] in March also showed LAD mid 90% and LCx 95%  "stenosis.    Chest pain relieved with nitro in the ED, EKG without acute ischemia, troponin negative, chest x-ray without acute process.  Continue home brilinta/statin  Plan for [facility] today-prophylaxis for iodine allergy    Coronary artery disease involving native coronary artery of native heart  "March 29, 2019:  LHC and PCI of RCA -  "Patient has serial mid 99% eccentric lesions consistent with plaque rupture site and abnormal EKG findings.... We initially pre-dilated with 3.0 balloon.  We placed a 3.0 by 12 mm resolute kenya stent in the midportion of the vessel.  We overlapped that with a 3.5 x 15 mm resolute kenya stent in the more proximal portion mid RCA.  Good result was achieved with MADINA 3 flow through the vessel.  Lad has a mid 90% stenosis and the left circumflex as well as the long 95% lesion as well."  Continue home brilinta/statin. Not on aspirin due to allergy.       Uncontrolled type 2 diabetes mellitus with diabetic polyneuropathy, with long-term current use of insulin  Goal -180.   Lab Results   Component Value Date    HGBA1C 7.5 (H) 11/17/2019   Basal 10 u; SSI  and add prandial accordingly       Hyperlipidemia associated with type 2 diabetes mellitus  Lab Results   Component Value Date    LDLCALC 24.8 (L) 11/18/2019   Continue home statin      Hypertension associated with diabetes  Fair control. Continue home amlodipine, atenolol, hctz. PRN clonidine    Anxiety  Continue home prozac        VTE Risk Mitigation (From admission, onward)         Ordered     IP VTE HIGH RISK PATIENT  Once      11/17/19 1609     Place sequential compression device  Until discontinued      11/17/19 1609                      Jeanie Zee NP  Department of [facility] Medicine   [facility]  "

## 2019-11-18 NOTE — ASSESSMENT & PLAN NOTE
""March 29, 2019:  [facility] and PCI of RCA -  "Patient has serial mid 99% eccentric lesions consistent with plaque rupture site and abnormal EKG findings.... We initially pre-dilated with 3.0 balloon.  We placed a 3.0 by 12 mm resolute kenya stent in the midportion of the vessel.  We overlapped that with a 3.5 x 15 mm resolute kenya stent in the more proximal portion mid RCA.  Good result was achieved with MADINA 3 flow through the vessel.  Lad has a mid 90% stenosis and the left circumflex as well as the long 95% lesion as well."  Continue home brilinta/statin. Not on aspirin due to allergy.     "

## 2019-11-18 NOTE — PLAN OF CARE
11/18/19 1617   Final Note   Assessment Type Final Discharge Note   Anticipated Discharge Disposition Home   Hospital Follow Up  Appt(s) scheduled? Yes   Discharge plans and expectations educations in teach back method with documentation complete? Yes   Right Care Referral Info   Post Acute Recommendation No Care   pts nurse Marleni notified that pt can d/c from CM standpoint.

## 2019-11-18 NOTE — HPI
HPI: Lorena Contreras 69 y.o. female with CAD, HTN, HLD, DM2 presents to the hospital with a chief complaint of chest pain. She reports today at 11am she developed sternal chest pain without radiation that was constant until relieved with nitro in the ED. She states it was not as severe as previous chest pain when she had her heart attack. She is pain free now. She does not experience exertional chest pain and finds she is able to mow her yard without pain. She does not need her nitro PRN at home. She denies fever, SOB, N/V, abdominal pain, dysuria, dizziness, syncope, hemoptysis.     Currently CP free  Troponin negative  EKG NSR NSSTT changes    Known to me  Previously followed by Dr Barbosa - last seen 5/16/19  Patient is here for follow-up of coronary artery disease and ST-elevation MI.  She underwent PCI to the RCA.  She is known to have other blockages well for which she re-presented to the emergency department was admitted for observation for atypical chest pain.  She felt like it was anxiety related and she no longer has had any since discharge.  She underwent nuclear stress test which showed no significant ischemia.  She denies any other associated symptoms currently.  She has experienced no PND, orthopnea or lower extremity edema.  She denies any dizziness, presyncope or syncope.  She says she was given Atarax on discharge which has helped with her anxiety.     Today:  Her follow-up coronary artery disease and previous ST-elevation MI.  She is feeling anxious and feels like she has a panic attack today.  She again is somewhat tearful and says that her  is been feeling ill as well.  She says she was started on Prozac by her primary care physician but does not feel like this is doing much in terms of calming her down.  She still has some mild residual chest pain when she mostly feels anxious.  This is dissimilar to her previous angina symptoms.  She denies any other associated symptoms.  She denies  any PND, orthopnea or lower extremity edema.  She denies any dizziness, presyncope or syncope.       LHC: 3-19  · Three vessel coronary artery disease.  · Successful PCI for acute myocardial infarction of culprit RCA.  · Prox RCA lesion , 99% stenosed reduced to 0%..  · A STENT RESOLUTE CRISSY [id number] stent was successfully placed at 14 ROGER  · Mid RCA lesion , 95% stenosed reduced to 0%..  · A STENT RESOLUTE CRISSY [id number] stent was successfully placed at 12 ROGER  · Prox Cx to Dist Cx lesion , 95% stenosed.  · Ost 1st Mrg to 1st Mrg lesion , 90% stenosed.  · Diffusely diseased LAD which is small vessel as well     Echo: 3/30/19  · Normal left ventricular systolic function. The estimated ejection fraction is 55%  · Concentric left ventricular hypertrophy.  · Grade II (moderate) left ventricular diastolic dysfunction consistent with pseudonormalization.  · Elevated left atrial pressure.  · Severe left atrial enlargement.  · The estimated PA systolic pressure is 52 mm Hg  · Pulmonary hypertension present.     NST: 4-19  · Abnormal myocardial perfusion scan  · There were no arrhythmias during stress.  · There was no ST segment deviation noted during stress.  · The patient reported dizziness and nausea during the stress test.  · There is a small amount of mild, infarct defect(s) in the inferior wall(s),In the typical distribution of the RCA territory.  · LVEF post-stress is 68%  · The EKG portion of this study is negative for myocardial ischemia.     Holter 7/17/19  · Sinus rhythm with heart rates varying between 82 and 136 bpm with an average of 96 bpm.  · There were very rare PVCs totalling 21 and averaging 0.44 per hour.  · There were very rare PACs totalling 30 and averaging 0.63 per hour.  · The diary was returned blank.     Carotid US 5/6/29  · There is 20-39% right Internal Carotid Stenosis.  · There is 0-19% left Internal Carotid Stenosis.      LE arterial doppler 5/6/19  · Hemodynamically significant greater than  70% lesion in the L SFA proximally  · Moderate greater than 50% plaque noted in the right SFA  · Noncompressible CLEMENT bilaterally     7/3/19 HR has been running in th 100-130 ranges even at rest at rehab  Has on atenolol previously which was switched to metoprolol after MI  Denies significant CP or SOB   sinus tachycardia - old IMI  Increase toprol XL 50 qd  Holter  OV 1 week     9/4/19 Says metoprolol gives her diarrhea - wants to go back to atenolol  Denies CP or SOB  Mild stable claudication

## 2019-11-18 NOTE — NURSING
Report received from Kathleen WHITLEY RN. Patient care assumed. Patient is AAOx4 and observed lying in bed with cardiac monitoring in progress. Vital signs stable and found in flow sheets with complete patient assessment. Skin dry and intact with scabs and scratches to right lower extremity and a 20g RAC PIV noted saline locked. No complaints of pain and no signs of respiratory distress noted. Plan to trend troponins, monitor and manage pain, plan for a 2D echo in the am, and maintain npo status after midnight for possible cardiac testing. Patient updated on plan of care and verbalized understanding. Call light in reach and patient instructed to inform the nurse if anything is needed. Patient stable and will continue to be monitored.

## 2019-11-18 NOTE — PROGRESS NOTES
WRITTEN HEALTHCARE DISCHARGE INFORMATION      Things that YOU are RESPONSIBLE for to Manage Your Care At Home:     1. Getting your prescriptions filled.  2. Taking you medications as directed. DO NOT MISS ANY DOSES!  3. Going to your follow-up doctor appointments. This is important because it allows the doctor to monitor your progress and to determine if any changes need to be made to your treatment plan.     If you are unable to make your follow up appointments, please call the number listed and reschedule this appointment.      ____________HELP AT HOME____________________     Experiencing any SIGNS or SYMPTOMS: YOU CAN     Schedule a same day appopintment with your Primary Care Doctor or  you can call [facility] On Call Nurse Care Line for 24/7 assistance at [phone]     If you are experience any signs or symptoms that have become severe, Call 911 and come to your nearest Emergency Room.     Thank you for choosing [facility] and allowing us to care for you.   From your care management team:      You should receive a call from [facility] Discharge Department within 48-72 hours to help manage your care after discharge. Please try to make sure that you answer your phone for this important phone call.    Follow-up Information     Karl Rico MD On 12/9/2019.    Specialty:  Cardiology  Why:  Appointment scheduled for December 9th at 2:15pm  Contact information:  [address]  [address]  [facility] [address]  [phone]             Kenneth Luu MD On 12/6/2019.    Specialty:  Internal Medicine  Why:  Appointment scheduled with December 6th at 10:30am  Contact information:  [address]  [facility] [address]  [phone]

## 2019-11-18 NOTE — SUBJECTIVE & OBJECTIVE
Past Medical History:   Diagnosis Date    Anxiety     Colon polyps     Coronary artery disease     Depression     Diabetes mellitus, type II     Fibromyalgia     Follicular lymphoma     HTN (hypertension)     MI (myocardial infarction) 03/2019    Restless leg syndrome        Past Surgical History:   Procedure Laterality Date    COLONOSCOPY  11/07/2012    Colon polyp found; repeat in 5 years    ELBOW SURGERY Right     dislocation repair     ESOPHAGOGASTRODUODENOSCOPY  11/07/2012    atrophic gastritis, H pylori testing negative    KNEE SURGERY Bilateral     scoped    LEFT HEART CATHETERIZATION Left 3/29/2019    Procedure: Left heart cath;  Surgeon: Bladimir Barbosa MD;  Location: [facility] CATH LAB;  Service: Cardiology;  Laterality: Left;       Review of patient's allergies indicates:   Allergen Reactions    Novolin 70/30 (semi-synthetic) Nausea And Vomiting     Severe vomiting on Relion 70/30    Shellfish containing products Swelling    Sulfa (sulfonamide antibiotics) Anaphylaxis    Victoza [liraglutide] Nausea And Vomiting    Glipizide Nausea Only    Asa [aspirin] Hives    Citric acid     Codeine     Egg derived     Iodine and iodide containing products     Rituxan [rituximab]     Soy     Talwin [pentazocine lactate]     Zoloft [sertraline]        No current facility-administered medications on file prior to encounter.      Current Outpatient Medications on File Prior to Encounter   Medication Sig    amLODIPine (NORVASC) 10 MG tablet TAKE ONE TABLET BY MOUTH ONCE DAILY    atenolol (TENORMIN) 50 MG tablet Take 1 tablet (50 mg total) by mouth once daily.    atorvastatin (LIPITOR) 80 MG tablet Take 1 tablet (80 mg total) by mouth every evening.    cholecalciferol, vitamin D3, (VITAMIN D3) 2,000 unit Cap Take 2 capsules by mouth 2 (two) times daily.    esomeprazole (NEXIUM) 20 MG capsule Take 20 mg by mouth before breakfast.    hydroCHLOROthiazide (HYDRODIURIL) 25 MG tablet TAKE 1 TABLET  "BY MOUTH ONCE DAILY    insulin aspart protamine-insulin aspart (NOVOLOG MIX 70-30FLEXPEN U-100) 100 unit/mL (70-30) InPn pen Inject 30 Units into the skin 2 (two) times daily before meals.    JANUVIA 100 mg Tab TAKE 1 TABLET BY MOUTH ONCE DAILY    magnesium oxide (MAG-OX) 400 mg tablet Take 400 mg by mouth once daily.    meclizine (ANTIVERT) 25 mg tablet Take 1 tablet (25 mg total) by mouth 3 (three) times daily as needed.    metFORMIN (GLUCOPHAGE) 1000 MG tablet TAKE 1 TABLET BY MOUTH TWICE DAILY WITH MEALS    pantoprazole (PROTONIX) 40 MG tablet Take 1 tablet (40 mg total) by mouth once daily.    ticagrelor (BRILINTA) 90 mg tablet Take 1 tablet (90 mg total) by mouth 2 (two) times daily.    bismuth tribrom-petrolatum,wh (XEROFORM) 5 X 9 " Bndg Apply dressing to wound daily    blood sugar diagnostic (FREESTYLE TEST) Strp 1 strip by Misc.(Non-Drug; Combo Route) route 4 (four) times daily.    FLUoxetine 20 MG capsule Take 1 capsule (20 mg total) by mouth once daily.    furosemide (LASIX) 20 MG tablet Take 1 tablet (20 mg total) by mouth daily as needed (leg swelling).    hydrOXYzine HCl (ATARAX) 10 MG Tab Take 1 tablet (10 mg total) by mouth 3 (three) times daily as needed.    lancets 32 gauge Misc 1 lancet by Misc.(Non-Drug; Combo Route) route 4 (four) times daily.    nitroGLYCERIN (NITROSTAT) 0.4 MG SL tablet Place 1 tablet (0.4 mg total) under the tongue every 5 (five) minutes as needed for Chest pain.    pen needle, diabetic 32 gauge x 5/32" Ndle Use with Novolog Mix Flexpens    tolnaftate (TINACTIN) 1 % cream Apply topically 2 (two) times daily. (Patient taking differently: Apply topically 2 (two) times daily as needed. )    triamcinolone acetonide 0.1% (KENALOG) 0.1 % cream APPLY  CREAM EXTERNALLY TO AFFECTED AREA TWICE DAILY AS NEEDED     Family History     Problem Relation (Age of Onset)    Cancer Mother, Father    Diabetes Sister    Heart disease Mother    Hypertension Sister        Tobacco " Use    Smoking status: Never Smoker    Smokeless tobacco: Never Used   Substance and Sexual Activity    Alcohol use: No    Drug use: No    Sexual activity: Not Currently     Review of Systems   Constitution: Negative for decreased appetite.   HENT: Negative for ear discharge.    Eyes: Negative for blurred vision.   Respiratory: Negative for hemoptysis.    Endocrine: Negative for polyphagia.   Hematologic/Lymphatic: Negative for adenopathy.   Skin: Negative for color change.   Musculoskeletal: Negative for joint swelling.   Genitourinary: Negative for bladder incontinence.   Neurological: Negative for brief paralysis.   Psychiatric/Behavioral: Negative for hallucinations.   Allergic/Immunologic: Negative for hives.     Objective:     Vital Signs (Most Recent):  Temp: 98.1 °F (36.7 °C) (11/18/19 0743)  Pulse: 73 (11/18/19 0743)  Resp: 16 (11/18/19 0743)  BP: (!) 158/68 (11/18/19 0743)  SpO2: 97 % (11/18/19 0743) Vital Signs (24h Range):  Temp:  [97.9 °F (36.6 °C)-98.9 °F (37.2 °C)] 98.1 °F (36.7 °C)  Pulse:  [73-93] 73  Resp:  [14-19] 16  SpO2:  [97 %-100 %] 97 %  BP: (136-174)/(64-73) 158/68     Weight: 68.2 kg (150 lb 5.7 oz)  Body mass index is 21.57 kg/m².    SpO2: 97 %  O2 Device (Oxygen Therapy): room air      Intake/Output Summary (Last 24 hours) at 11/18/2019 0834  Last data filed at 11/18/2019 0011  Gross per 24 hour   Intake 315 ml   Output --   Net 315 ml       Lines/Drains/Airways     Peripheral Intravenous Line                 Peripheral IV - Single Lumen 11/18/19 0040 22 G Left Forearm less than 1 day                Physical Exam   Constitutional: She is oriented to person, place, and time. She appears well-developed and well-nourished.   HENT:   Head: Normocephalic and atraumatic.   Eyes: Pupils are equal, round, and reactive to light. Conjunctivae are normal.   Neck: Normal range of motion. Neck supple.   Cardiovascular: Normal rate, normal heart sounds and intact distal pulses.   Pulmonary/Chest:  Effort normal and breath sounds normal.   Abdominal: Soft. Bowel sounds are normal.   Musculoskeletal: Normal range of motion.   Neurological: She is alert and oriented to person, place, and time.   Skin: Skin is warm and dry.       Significant Labs: All pertinent lab results from the last 24 hours have been reviewed.    Significant Imaging: Echocardiogram:   2D echo with color flow doppler:   Results for orders placed or performed during the hospital encounter of 12/16/15   2D Echo w/ Color Flow Doppler   Result Value Ref Range    QEF 55 55 - 65    Mitral Valve Regurgitation TRIVIAL     Diastolic Dysfunction Yes (A)     Est. PA Systolic Pressure 36.48     Tricuspid Valve Regurgitation MILD     Narrative    TEST DESCRIPTION   Technical Quality: This is a technically adequate study.     Aorta: The aortic root is normal in size, measuring 2.6 cm at sinotubular junction and 2.5 cm at Sinuses of Valsalva. The proximal ascending aorta is normal in size, measuring 2.8 cm across.     Left Atrium: The left atrial volume index is severely enlarged, measuring 48.37 cc/m2.     Left Ventricle: The left ventricle is normal in size, with an end-diastolic diameter of 4.3 cm, and an end-systolic diameter of 3.1 cm. LV wall thickness is normal, with the septum measuring 1.1 cm and the posterior wall measuring 1.0 cm across. Relative   wall thickness was increased at 0.47, and the LV mass index was 88.2 g/m2 consistent with concentric remodeling. Global left ventricular systolic function appears normal. Visually estimated ejection fraction is 55-60%. The LV Doppler derived stroke   volume equals 82.0 ccs.   The E/e'(lat) is 12, consistent with significant diastolic dysfunction.     Right Atrium: The right atrium is normal in size, measuring 5.0 cm in length and 3.3 cm in width in the apical view.     Right Ventricle: The right ventricle is normal in size measuring 2.6 cm at the base in the apical right ventricle-focused view. Global  right ventricular systolic function appears normal. Tricuspid annular plane systolic excursion (TAPSE) is 2.3 cm. The   estimated RV systolic pressure is greater than 36 mmHg.     Aortic Valve:  The aortic valve is mildly sclerotic.     Mitral Valve:  There is trivial mitral regurgitation. There is mitral annular calcification. Mitral valve is normal in structure with normal leaflet mobility.     Tricuspid Valve:  There is mild tricuspid regurgitation. Tricuspid valve is normal in structure with normal leaflet mobility.     Pulmonary Valve:  The pulmonic valve is not well seen.     IVC: The IVC is not visualized.     Intracavitary: There is no evidence of pericardial effusion, intracavity mass, thrombi, or vegetation.     Normal regional wall motion..         CONCLUSIONS     1 - Normal left ventricular systolic function (EF 55-60%).  Normal regional wall motion.     2 - Concentric remodeling.     3 - Trivial mitral regurgitation.     4 - Mild tricuspid regurgitation.     5 - The estimated RV systolic pressure is greater than 36 mmHg.         This document has been electronically    SIGNED BY: Greg Keith MD On: 12/16/2015 11:40

## 2019-11-18 NOTE — PLAN OF CARE
11/18/19 1020   Discharge Assessment   Assessment Type Discharge Planning Assessment   Assessment information obtained from? Medical Record   Prior to hospitilization cognitive status: Alert/Oriented   Prior to hospitalization functional status: Independent   Current cognitive status: Alert/Oriented   Current Functional Status: Independent   Facility Arrived From: home   Lives With spouse   Able to Return to Prior Arrangements yes   Is patient able to care for self after discharge? Yes   Who are your caregiver(s) and their phone number(s)? [place]- [phone]   Patient's perception of discharge disposition home or selfcare   Readmission Within the Last 30 Days no previous admission in last 30 days   Patient currently being followed by outpatient case management? No   Patient currently receives any other outside agency services? No   Equipment Currently Used at Home none   Do you have any problems affording any of your prescribed medications? No   Is the patient taking medications as prescribed? yes   Does the patient have transportation home? Yes   Transportation Anticipated family or friend will provide   Does the patient receive services at the Coumadin Clinic? No   Discharge Plan A Home with family  (with follow up appointment)   DME Needed Upon Discharge  none   Patient/Family in Agreement with Plan yes     [facility] - [place] - [address]  [address]  [place] [address]  Phone: [phone] Fax: [phone]

## 2019-11-18 NOTE — SUBJECTIVE & OBJECTIVE
Interval History: Denies chest pain     Review of Systems   Constitutional: Negative for chills and fever.   HENT: Negative for nosebleeds and tinnitus.    Eyes: Negative for photophobia and visual disturbance.   Respiratory: Negative for shortness of breath and wheezing.    Cardiovascular: Negative for palpitations and leg swelling. Chest pain: resolved.   Gastrointestinal: Negative for abdominal distention, nausea and vomiting.   Genitourinary: Negative for dysuria, flank pain and hematuria.   Musculoskeletal: Negative for gait problem and joint swelling.   Skin: Negative for rash and wound.   Neurological: Negative for seizures and syncope.     Objective:     Vital Signs (Most Recent):  Temp: 98.1 °F (36.7 °C) (11/18/19 0743)  Pulse: 73 (11/18/19 0743)  Resp: 16 (11/18/19 0743)  BP: (!) 158/68 (11/18/19 0743)  SpO2: 97 % (11/18/19 0743) Vital Signs (24h Range):  Temp:  [97.9 °F (36.6 °C)-98.9 °F (37.2 °C)] 98.1 °F (36.7 °C)  Pulse:  [73-93] 73  Resp:  [14-19] 16  SpO2:  [97 %-100 %] 97 %  BP: (136-174)/(64-73) 158/68     Weight: 68.2 kg (150 lb 5.7 oz)  Body mass index is 21.57 kg/m².    Intake/Output Summary (Last 24 hours) at 11/18/2019 0923  Last data filed at 11/18/2019 0011  Gross per 24 hour   Intake 315 ml   Output --   Net 315 ml      Physical Exam   Constitutional: She is oriented to person, place, and time. She appears well-developed and well-nourished. No distress.   HENT:   Head: Normocephalic and atraumatic.   Eyes: Conjunctivae and EOM are normal. Right eye exhibits no discharge. Left eye exhibits no discharge.   Neck: Normal range of motion. No thyromegaly present.   Cardiovascular: Normal rate and regular rhythm.   No murmur heard.  Pulmonary/Chest: Effort normal and breath sounds normal. No respiratory distress. She exhibits no tenderness.   Abdominal: Soft. Bowel sounds are normal. She exhibits no distension and no mass. There is no tenderness.   Musculoskeletal: She exhibits no edema or  deformity.   Neurological: She is alert and oriented to person, place, and time.   Skin: Skin is warm and dry.   Psychiatric: She has a normal mood and affect. Her behavior is normal.   Nursing note and vitals reviewed.      Significant Labs: All pertinent labs within the past 24 hours have been reviewed.    Significant Imaging: I have reviewed and interpreted all pertinent imaging results/findings within the past 24 hours.

## 2019-11-18 NOTE — ASSESSMENT & PLAN NOTE
Has known history of CAD with hx RCA stent x 2 to RCA. LHC in March also showed LAD mid 90% and LCx 95% stenosis.    Chest pain relieved with nitro in the ED, EKG without acute ischemia, troponin negative, chest x-ray without acute process.  Continue home brilinta/statin  Plan for [facility] today-prophylaxis for iodine allergy

## 2019-11-19 LAB — POCT GLUCOSE: 312 MG/DL (ref 70–110)

## 2019-11-19 NOTE — NURSING
Patient bedrest complete. Right groin bandage with small amount of old dry blood noted. Per patient no pain to area besides some skin pulling from the pressure dressing. Patient up to restroom with no deficits noted. Last set of vital signs obtained and found in flow sheets and are WNL. Will prepare discharge instructions for patient.

## 2019-11-19 NOTE — NURSING
"Patients recheck BG is noted 312  Which is down from 435. Per patient she doesn't want to stay an longer to get anything else. Per patient " I will take my diabetes medicine when I get home and it'll come down". Nurse informed ARMIDA Kruger in which he okayed the discharge. Discharge instructions given and patient notified of follow-up appointments in which she verbalized understanding. Groin site rechecked and no bleeding noted. 22g LFA PIV removed. Patient with blue folder in hand with discharge instructions placed in it. No complaints of pain and no signs of distress noted. Patient assisted to vehicle.   "

## 2019-11-20 NOTE — DISCHARGE SUMMARY
[facility] Hospital Medicine  Discharge Summary      Patient Name: Lorena Contreras  MRN: [id number]  Admission Date: 11/17/2019  Hospital Length of Stay: 0 days  Discharge Date and Time: 11/18/2019   Attending Physician:Rosa Luu MD  Discharging Provider: Jeanie Zee NP  Primary Care Provider: Kenneth Luu MD      HPI:   Lorena Contreras 69 y.o. female with CAD, HTN, HLD, DM2 presents to the hospital with a chief complaint of chest pain. She reports today at 11am she developed sternal chest pain without radiation that was constant until relieved with nitro in the ED. She states it was not as severe as previous chest pain when she had her heart attack. She is pain free now. She does not experience exertional chest pain and finds she is able to mow her yard without pain. She does not need her nitro PRN at home. She denies fever, SOB, N/V, abdominal pain, dysuria, dizziness, syncope, hemoptysis.     In the ED, EKG without acute ischemia, troponin negative, chest x-ray without acute process.     Procedure(s) (LRB):  Left heart cath (Left)      Hospital Course:   Lorena Contreras 69 y.o. female placed in observation for chest pain. In the ED, EKG without acute ischemia, troponin negative, chest x-ray without acute process. Cardiology consulted. On 11/18/19,no chest pain overnight. LHC via Right fem art  showed :EDP 16, LAD mid 50%, Cx long mid 80-90% - diffusely diseased - similar to LHC 3/29/19, OM1 90% mid, RCA stents patent 50% beyond stents. Post procedure, no complications and HDS. Added Imdur and continue to Brilinta/statin. Allergy to ASA. Consider add ACEI/ARBS if blood pressure tolerates. Follow up with Dr. Rico 12/9/19.      Consults:       Final Active Diagnoses:    Diagnosis Date Noted POA    PRINCIPAL PROBLEM:  Chest pain [R07.9] 04/09/2019 Yes    Coronary artery disease involving native coronary artery of native heart [I25.10] 03/29/2019 Yes     Uncontrolled type 2 diabetes mellitus with diabetic polyneuropathy, with long-term current use of insulin [E11.42, Z79.4, E11.65] 11/08/2016 Not Applicable     Chronic    Hyperlipidemia associated with type 2 diabetes mellitus [E11.69, E78.5] 07/30/2015 Yes     Chronic    Anxiety [F41.9] 01/24/2014 Yes     Chronic    Hypertension associated with diabetes [E11.59, I10] 01/24/2014 Yes     Chronic      Problems Resolved During this Admission:       Discharged Condition: good    Disposition: Home or Self Care    Follow Up:  Follow-up Information     Karl Rico MD On 12/9/2019.    Specialty:  Cardiology  Why:  Appointment scheduled for December 9th at 2:15pm  Contact information:  [address]  [address]  [facility] [address]  [phone]             Kenneth Luu MD On 12/6/2019.    Specialty:  Internal Medicine  Why:  Appointment scheduled with December 6th at 10:30am  Contact information:  [address]  [facility] [address]  [phone]                 Patient Instructions:      Diet Cardiac     Diet diabetic     Notify your health care provider if you experience any of the following:  temperature >100.4     Notify your health care provider if you experience any of the following:  difficulty breathing or increased cough     Notify your health care provider if you experience any of the following:  increased confusion or weakness       Significant Diagnostic Studies: Labs: All labs within the past 24 hours have been reviewed    Pending Diagnostic Studies:     None         Medications:  Reconciled Home Medications:      Medication List      START taking these medications    isosorbide mononitrate 30 MG 24 hr tablet  Commonly known as:  IMDUR  Take 1 tablet (30 mg total) by mouth once daily.        CHANGE how you take these medications    tolnaftate 1 % cream  Commonly known as:  TINACTIN  Apply topically 2 (two) times daily.  What changed:    · when to take this  · reasons to take this        CONTINUE  "taking these medications    amLODIPine 10 MG tablet  Commonly known as:  NORVASC  TAKE ONE TABLET BY MOUTH ONCE DAILY     atenolol 50 MG tablet  Commonly known as:  TENORMIN  Take 1 tablet (50 mg total) by mouth once daily.     atorvastatin 80 MG tablet  Commonly known as:  LIPITOR  Take 1 tablet (80 mg total) by mouth every evening.     bismuth tribrom-petrolatum,wh 5 X 9 " Bndg  Commonly known as:  Xeroform  Apply dressing to wound daily     esomeprazole 20 MG capsule  Commonly known as:  NEXIUM  Take 20 mg by mouth before breakfast.     FLUoxetine 20 MG capsule  Take 1 capsule (20 mg total) by mouth once daily.     FreeStyle Test Strp  Generic drug:  blood sugar diagnostic  1 strip by Misc.(Non-Drug; Combo Route) route 4 (four) times daily.     furosemide 20 MG tablet  Commonly known as:  LASIX  Take 1 tablet (20 mg total) by mouth daily as needed (leg swelling).     hydroCHLOROthiazide 25 MG tablet  Commonly known as:  HYDRODIURIL  TAKE 1 TABLET BY MOUTH ONCE DAILY     hydrOXYzine HCl 10 MG Tab  Commonly known as:  ATARAX  Take 1 tablet (10 mg total) by mouth 3 (three) times daily as needed.     insulin aspart protamine-insulin aspart 100 unit/mL (70-30) Inpn pen  Commonly known as:  NovoLOG Mix 70-30FlexPen U-100  Inject 30 Units into the skin 2 (two) times daily before meals.     Januvia 100 MG Tab  Generic drug:  SITagliptin  TAKE 1 TABLET BY MOUTH ONCE DAILY     lancets 32 gauge Misc  1 lancet by Misc.(Non-Drug; Combo Route) route 4 (four) times daily.     magnesium oxide 400 mg (241.3 mg magnesium) tablet  Commonly known as:  MAG-OX  Take 400 mg by mouth once daily.     meclizine 25 mg tablet  Commonly known as:  ANTIVERT  Take 1 tablet (25 mg total) by mouth 3 (three) times daily as needed.     metFORMIN 1000 MG tablet  Commonly known as:  GLUCOPHAGE  TAKE 1 TABLET BY MOUTH TWICE DAILY WITH MEALS     nitroGLYCERIN 0.4 MG SL tablet  Commonly known as:  NITROSTAT  Place 1 tablet (0.4 mg total) under the " "tongue every 5 (five) minutes as needed for Chest pain.     pantoprazole 40 MG tablet  Commonly known as:  PROTONIX  Take 1 tablet (40 mg total) by mouth once daily.     pen needle, diabetic 32 gauge x 5/32" Ndle  Use with Novolog Mix Flexpens     ticagrelor 90 mg tablet  Commonly known as:  BRILINTA  Take 1 tablet (90 mg total) by mouth 2 (two) times daily.     triamcinolone acetonide 0.1% 0.1 % cream  Commonly known as:  KENALOG  APPLY  CREAM EXTERNALLY TO AFFECTED AREA TWICE DAILY AS NEEDED     Vitamin D3 50 mcg (2,000 unit) Cap  Generic drug:  cholecalciferol (vitamin D3)  Take 2 capsules by mouth 2 (two) times daily.            Indwelling Lines/Drains at time of discharge:   Lines/Drains/Airways     None                 Time spent on the discharge of patient: 35 minutes  Patient was seen and examined on the date of discharge and determined to be suitable for discharge.         Jeanie Zee NP  Department of Hospital Medicine  [facility]  "

## 2019-11-25 ENCOUNTER — TELEPHONE (OUTPATIENT)
Dept: CARDIOLOGY | Facility: CLINIC | Age: 69
End: 2019-11-25

## 2019-11-26 ENCOUNTER — PATIENT OUTREACH (OUTPATIENT)
Dept: ADMINISTRATIVE | Facility: HOSPITAL | Age: 69
End: 2019-11-26

## 2019-12-06 ENCOUNTER — PATIENT OUTREACH (OUTPATIENT)
Dept: ADMINISTRATIVE | Facility: OTHER | Age: 69
End: 2019-12-06

## 2019-12-08 NOTE — PLAN OF CARE
Problem: Adult Inpatient Plan of Care  Goal: Plan of Care Review  Flowsheets (Taken 11/18/2019 0200)  Plan of Care Reviewed With: patient     Problem: Fall Injury Risk  Goal: Absence of Fall and Fall-Related Injury  Intervention: Identify and Manage Contributors to Fall Injury Risk  Flowsheets (Taken 11/18/2019 0200)  Self-Care Promotion: independence encouraged; BADL personal objects within reach; BADL personal routines maintained  Medication Review/Management: medications reviewed  Intervention: Promote Injury-Free Environment  Flowsheets (Taken 11/18/2019 0200)  Safety Promotion/Fall Prevention: Fall Risk reviewed with patient/family; room near unit station; side rails raised x 2; supervised activity; instructed to call staff for mobility  Environmental Safety Modification: room near unit station; clutter free environment maintained; lighting adjusted; room organization consistent     Problem: Adult Inpatient Plan of Care  Goal: Absence of Hospital-Acquired Illness or Injury  Intervention: Identify and Manage Fall Risk  Flowsheets (Taken 11/18/2019 0200)  Safety Promotion/Fall Prevention: Fall Risk reviewed with patient/family; room near unit station; side rails raised x 2; supervised activity; instructed to call staff for mobility  Intervention: Prevent VTE (venous thromboembolism)  Flowsheets (Taken 11/18/2019 0200)  VTE Prevention/Management: ROM (active) performed; other (see comments) (Heparin administered)     Problem: Adult Inpatient Plan of Care  Goal: Optimal Comfort and Wellbeing  Intervention: Provide Person-Centered Care  Flowsheets (Taken 11/18/2019 0200)  Trust Relationship/Rapport: care explained; choices provided; questions answered; questions encouraged; thoughts/feelings acknowledged; reassurance provided     Problem: Diabetes Comorbidity  Goal: Blood Glucose Level Within Desired Range  Intervention: Maintain Glycemic Control  Flowsheets (Taken 11/18/2019 0200)  Glycemic Management: blood  glucose monitoring; supplemental insulin given     Problem: Adjustment to Illness (Acute Coronary Syndrome)  Goal: Optimal Adaptation to Illness  Intervention: Support Adjustment to Life-Changing Event  Flowsheets (Taken 11/18/2019 0200)  Supportive Measures: verbalization of feelings encouraged  Family/Support System Care: involvement promoted; self-care encouraged     Problem: Arrhythmia (Acute Coronary Syndrome)  Goal: Normalized Cardiac Rhythm  Intervention: Monitor and Manage Cardiac Rhythm Effects  Flowsheets (Taken 11/18/2019 0200)  VTE Prevention/Management: ROM (active) performed; other (see comments) (Heparin administered)     Problem: Pain (Acute Coronary Syndrome)  Goal: Absence of Cardiac-Related Pain  Intervention: Manage Cardiac Pain Symptoms  Flowsheets (Taken 11/18/2019 0200)  Chest Pain Intervention: cardiac biomarkers drawn; cardiac monitoring continued; cardiac monitor placed; 12-lead ECG obtained; activity minimized     Problem: Tissue Perfusion (Acute Coronary Syndrome)  Goal: Adequate Tissue Perfusion  Intervention: Optimize Cardiac Tissue Perfusion  Flowsheets (Taken 11/18/2019 0200)  Airway/Ventilation Management: calming measures promoted  Activity Management: activity adjusted per tolerance; activity clustered for rest period  Environmental Support: calm environment promoted; personal routine supported     Patient remained free of injury throughout the shift. Vital signs remained WNL. No complaints of pain and no signs of respiratory distress noted. Patient complaining that RAC PIV uncomfortable in which nurse changed site and new 22g LFA PIV placed. Cardiology consulted. Plan to trend troponins, monitor and manage pain, maintain npo status after midnight for possible cardiac testing in the am, and plan for a 2D echo in the am. Patient updated on plan of care and verbalized understanding. Call light in reach and patient instructed to inform the nurse if anything is needed. Patient stable  and will continue to be monitored.    WDL

## 2019-12-09 ENCOUNTER — OFFICE VISIT (OUTPATIENT)
Dept: CARDIOLOGY | Facility: CLINIC | Age: 69
End: 2019-12-09
Payer: MEDICARE

## 2019-12-09 VITALS
BODY MASS INDEX: 26.82 KG/M2 | SYSTOLIC BLOOD PRESSURE: 177 MMHG | HEART RATE: 95 BPM | HEIGHT: 70 IN | WEIGHT: 187.38 LBS | OXYGEN SATURATION: 99 % | DIASTOLIC BLOOD PRESSURE: 73 MMHG

## 2019-12-09 DIAGNOSIS — E11.59 HYPERTENSION ASSOCIATED WITH DIABETES: Chronic | ICD-10-CM

## 2019-12-09 DIAGNOSIS — I21.19 ACUTE INFERIOR MYOCARDIAL INFARCTION: Primary | Chronic | ICD-10-CM

## 2019-12-09 DIAGNOSIS — I25.10 CORONARY ARTERY DISEASE INVOLVING NATIVE CORONARY ARTERY OF NATIVE HEART WITHOUT ANGINA PECTORIS: ICD-10-CM

## 2019-12-09 DIAGNOSIS — I15.2 HYPERTENSION ASSOCIATED WITH DIABETES: Chronic | ICD-10-CM

## 2019-12-09 DIAGNOSIS — I51.7 CARDIOMEGALY: ICD-10-CM

## 2019-12-09 PROCEDURE — 99999 PR PBB SHADOW E&M-EST. PATIENT-LVL V: CPT | Mod: PBBFAC,,, | Performed by: INTERNAL MEDICINE

## 2019-12-09 PROCEDURE — 3078F DIAST BP <80 MM HG: CPT | Mod: CPTII,S$GLB,, | Performed by: INTERNAL MEDICINE

## 2019-12-09 PROCEDURE — 1101F PR PT FALLS ASSESS DOC 0-1 FALLS W/OUT INJ PAST YR: ICD-10-PCS | Mod: CPTII,S$GLB,, | Performed by: INTERNAL MEDICINE

## 2019-12-09 PROCEDURE — 3077F SYST BP >= 140 MM HG: CPT | Mod: CPTII,S$GLB,, | Performed by: INTERNAL MEDICINE

## 2019-12-09 PROCEDURE — 99214 OFFICE O/P EST MOD 30 MIN: CPT | Mod: S$GLB,,, | Performed by: INTERNAL MEDICINE

## 2019-12-09 PROCEDURE — 1159F PR MEDICATION LIST DOCUMENTED IN MEDICAL RECORD: ICD-10-PCS | Mod: S$GLB,,, | Performed by: INTERNAL MEDICINE

## 2019-12-09 PROCEDURE — 1159F MED LIST DOCD IN RCRD: CPT | Mod: S$GLB,,, | Performed by: INTERNAL MEDICINE

## 2019-12-09 PROCEDURE — 1125F AMNT PAIN NOTED PAIN PRSNT: CPT | Mod: S$GLB,,, | Performed by: INTERNAL MEDICINE

## 2019-12-09 PROCEDURE — 99999 PR PBB SHADOW E&M-EST. PATIENT-LVL V: ICD-10-PCS | Mod: PBBFAC,,, | Performed by: INTERNAL MEDICINE

## 2019-12-09 PROCEDURE — 3078F PR MOST RECENT DIASTOLIC BLOOD PRESSURE < 80 MM HG: ICD-10-PCS | Mod: CPTII,S$GLB,, | Performed by: INTERNAL MEDICINE

## 2019-12-09 PROCEDURE — 1101F PT FALLS ASSESS-DOCD LE1/YR: CPT | Mod: CPTII,S$GLB,, | Performed by: INTERNAL MEDICINE

## 2019-12-09 PROCEDURE — 1125F PR PAIN SEVERITY QUANTIFIED, PAIN PRESENT: ICD-10-PCS | Mod: S$GLB,,, | Performed by: INTERNAL MEDICINE

## 2019-12-09 PROCEDURE — 99214 PR OFFICE/OUTPT VISIT, EST, LEVL IV, 30-39 MIN: ICD-10-PCS | Mod: S$GLB,,, | Performed by: INTERNAL MEDICINE

## 2019-12-09 PROCEDURE — 3077F PR MOST RECENT SYSTOLIC BLOOD PRESSURE >= 140 MM HG: ICD-10-PCS | Mod: CPTII,S$GLB,, | Performed by: INTERNAL MEDICINE

## 2019-12-09 RX ORDER — ATENOLOL 50 MG/1
50 TABLET ORAL 2 TIMES DAILY
Qty: 180 TABLET | Refills: 3 | Status: SHIPPED | OUTPATIENT
Start: 2019-12-09 | End: 2020-03-25 | Stop reason: SDUPTHER

## 2019-12-09 NOTE — PROGRESS NOTES
Subjective:    Patient ID:  Lorena Contreras is a 69 y.o. female who presents for follow-up of Coronary Artery Disease      HPI     CAD - JESIKA x 2 to RCA 3/29/19 - 3 vessel CAD noted, HTN, HLD, DM    11/18/19 [facility] - EDP 16, LAD mid 50%, Cx long mid 80-90% - diffusely diseased - similar to [facility] 3/29/19, OM1 90% mid, RCA stents patent 50% beyond stents     Will review with interventional staff for possible out patient PCI - continue with medical Rx for now    Previously followed by Dr Barbosa - last seen 5/16/19  Patient is here for follow-up of coronary artery disease and ST-elevation MI.  She underwent PCI to the RCA.  She is known to have other blockages well for which she re-presented to the emergency department was admitted for observation for atypical chest pain.  She felt like it was anxiety related and she no longer has had any since discharge.  She underwent nuclear stress test which showed no significant ischemia.  She denies any other associated symptoms currently.  She has experienced no PND, orthopnea or lower extremity edema.  She denies any dizziness, presyncope or syncope.  She says she was given Atarax on discharge which has helped with her anxiety.     Today:  Her follow-up coronary artery disease and previous ST-elevation MI.  She is feeling anxious and feels like she has a panic attack today.  She again is somewhat tearful and says that her  is been feeling ill as well.  She says she was started on Prozac by her primary care physician but does not feel like this is doing much in terms of calming her down.  She still has some mild residual chest pain when she mostly feels anxious.  This is dissimilar to her previous angina symptoms.  She denies any other associated symptoms.  She denies any PND, orthopnea or lower extremity edema.  She denies any dizziness, presyncope or syncope.       [facility]: 3-19  · Three vessel coronary artery disease.  · Successful PCI for acute myocardial infarction of  culprit RCA.  · Prox RCA lesion , 99% stenosed reduced to 0%..  · A STENT RESOLUTE CRISSY [id number] stent was successfully placed at 14 ROGER  · Mid RCA lesion , 95% stenosed reduced to 0%..  · A STENT RESOLUTE CRISSY [id number] stent was successfully placed at 12 ROGER  · Prox Cx to Dist Cx lesion , 95% stenosed.  · Ost 1st Mrg to 1st Mrg lesion , 90% stenosed.  · Diffusely diseased LAD which is small vessel as well     Echo 11/18/19  · Normal left ventricular systolic function. The estimated ejection fraction is 60%  · Concentric left ventricular hypertrophy.  · Grade II (moderate) left ventricular diastolic dysfunction consistent with pseudonormalization.  · Normal right ventricular systolic function.  · Moderate left atrial enlargement.  · Mild tricuspid regurgitation.  · The estimated PA systolic pressure is 50 mm Hg  · Pulmonary hypertension present.     [facility]: 4-19  · Abnormal myocardial perfusion scan  · There were no arrhythmias during stress.  · There was no ST segment deviation noted during stress.  · The patient reported dizziness and nausea during the stress test.  · There is a small amount of mild, infarct defect(s) in the inferior wall(s),In the typical distribution of the RCA territory.  · LVEF post-stress is 68%  · The EKG portion of this study is negative for myocardial ischemia.     Holter 7/17/19  · Sinus rhythm with heart rates varying between 82 and 136 bpm with an average of 96 bpm.  · There were very rare PVCs totalling 21 and averaging 0.44 per hour.  · There were very rare PACs totalling 30 and averaging 0.63 per hour.  · The diary was returned blank.     Carotid US 5/6/29  · There is 20-39% right Internal Carotid Stenosis.  · There is 0-19% left Internal Carotid Stenosis.      LE arterial doppler 5/6/19  · Hemodynamically significant greater than 70% lesion in the L SFA proximally  · Moderate greater than 50% plaque noted in the right SFA  · Noncompressible CLEMENT bilaterally     7/3/19 HR has been  running in th 100-130 ranges even at rest at rehab  Has on atenolol previously which was switched to metoprolol after MI  Denies significant CP or SOB   sinus tachycardia - old IMI  Increase toprol XL 50 qd  Holter  OV 1 week     9/4/19 Says metoprolol gives her diarrhea - wants to go back to atenolol  Denies CP or SOB  Mild stable claudication     Admitted 11/17/19  Lorena Contreras 69 y.o. female with CAD, HTN, HLD, DM2 presents to the hospital with a chief complaint of chest pain. She reports today at 11am she developed sternal chest pain without radiation that was constant until relieved with nitro in the ED. She states it was not as severe as previous chest pain when she had her heart attack. She is pain free now. She does not experience exertional chest pain and finds she is able to mow her yard without pain. She does not need her nitro PRN at home. She denies fever, SOB, N/V, abdominal pain, dysuria, dizziness, syncope, hemoptysis.      Lorena Contreras 69 y.o. female placed in observation for chest pain. In the ED, EKG without acute ischemia, troponin negative, chest x-ray without acute process. Cardiology consulted. On 11/18/19,no chest pain overnight. LHC via Right fem art  showed :EDP 16, LAD mid 50%, Cx long mid 80-90% - diffusely diseased - similar to LHC 3/29/19, OM1 90% mid, RCA stents patent 50% beyond stents. Post procedure, no complications and HDS. Added Imdur and continue to Brilinta/statin. Allergy to ASA. Consider add ACEI/ARBS if blood pressure tolerates. Follow up with Dr. Rico 12/9/19 12/9/19 Stopped imdur - worrying about reaction with hives several days after LHC  Still with angina during emotional or physical stress    Review of Systems   Constitution: Negative for decreased appetite.   HENT: Negative for ear discharge.    Eyes: Negative for blurred vision.   Respiratory: Negative for hemoptysis.    Endocrine: Negative for polyphagia.   Hematologic/Lymphatic: Negative  for adenopathy.   Skin: Negative for color change.   Musculoskeletal: Negative for joint swelling.   Genitourinary: Negative for bladder incontinence.   Neurological: Negative for brief paralysis.   Psychiatric/Behavioral: Negative for hallucinations.   Allergic/Immunologic: Negative for hives.        Objective:    Physical Exam   Constitutional: She is oriented to person, place, and time. She appears well-developed and well-nourished.   HENT:   Head: Normocephalic and atraumatic.   Eyes: Pupils are equal, round, and reactive to light. Conjunctivae and EOM are normal.   Neck: Normal range of motion. Neck supple. No thyromegaly present.   Cardiovascular: Normal rate and regular rhythm.   No murmur heard.  Pulmonary/Chest: Effort normal and breath sounds normal. No respiratory distress.   Abdominal: Soft. Bowel sounds are normal.   Musculoskeletal: She exhibits no edema.   Neurological: She is alert and oriented to person, place, and time.   Skin: Skin is warm and dry.   Psychiatric: She has a normal mood and affect. Her behavior is normal.         Assessment:       1. Acute inferior myocardial infarction    2. Cardiomegaly    3. Coronary artery disease involving native coronary artery of native heart without angina pectoris    4. Hypertension associated with diabetes    5. Uncontrolled type 2 diabetes mellitus with diabetic polyneuropathy, with long-term current use of insulin         Plan:       Doubt she is allergic to imdur - would restart  Increase atenolol 50 bid  Will refer to Dr Monreal to review [facility] for possible PCI  OV with me 3 months

## 2019-12-10 ENCOUNTER — OFFICE VISIT (OUTPATIENT)
Dept: INTERNAL MEDICINE | Facility: CLINIC | Age: 69
End: 2019-12-10
Payer: MEDICARE

## 2019-12-10 ENCOUNTER — DOCUMENTATION ONLY (OUTPATIENT)
Dept: INTERNAL MEDICINE | Facility: CLINIC | Age: 69
End: 2019-12-10

## 2019-12-10 VITALS
SYSTOLIC BLOOD PRESSURE: 134 MMHG | DIASTOLIC BLOOD PRESSURE: 68 MMHG | HEIGHT: 70 IN | HEART RATE: 86 BPM | WEIGHT: 180 LBS | BODY MASS INDEX: 25.77 KG/M2

## 2019-12-10 DIAGNOSIS — E11.22 CKD STAGE 3 SECONDARY TO DIABETES: ICD-10-CM

## 2019-12-10 DIAGNOSIS — F41.1 GENERALIZED ANXIETY DISORDER: Primary | ICD-10-CM

## 2019-12-10 DIAGNOSIS — I15.2 HYPERTENSION ASSOCIATED WITH DIABETES: Chronic | ICD-10-CM

## 2019-12-10 DIAGNOSIS — N18.30 CKD STAGE 3 SECONDARY TO DIABETES: ICD-10-CM

## 2019-12-10 DIAGNOSIS — F41.0 PANIC ATTACKS: ICD-10-CM

## 2019-12-10 DIAGNOSIS — L50.9 HIVES: ICD-10-CM

## 2019-12-10 DIAGNOSIS — E11.59 HYPERTENSION ASSOCIATED WITH DIABETES: Chronic | ICD-10-CM

## 2019-12-10 DIAGNOSIS — Z12.11 COLON CANCER SCREENING: ICD-10-CM

## 2019-12-10 PROCEDURE — 3078F PR MOST RECENT DIASTOLIC BLOOD PRESSURE < 80 MM HG: ICD-10-PCS | Mod: CPTII,S$GLB,, | Performed by: INTERNAL MEDICINE

## 2019-12-10 PROCEDURE — 99214 OFFICE O/P EST MOD 30 MIN: CPT | Mod: S$GLB,,, | Performed by: INTERNAL MEDICINE

## 2019-12-10 PROCEDURE — 99999 PR PBB SHADOW E&M-EST. PATIENT-LVL IV: ICD-10-PCS | Mod: PBBFAC,,, | Performed by: INTERNAL MEDICINE

## 2019-12-10 PROCEDURE — 1126F AMNT PAIN NOTED NONE PRSNT: CPT | Mod: S$GLB,,, | Performed by: INTERNAL MEDICINE

## 2019-12-10 PROCEDURE — 3075F SYST BP GE 130 - 139MM HG: CPT | Mod: CPTII,S$GLB,, | Performed by: INTERNAL MEDICINE

## 2019-12-10 PROCEDURE — 1126F PR PAIN SEVERITY QUANTIFIED, NO PAIN PRESENT: ICD-10-PCS | Mod: S$GLB,,, | Performed by: INTERNAL MEDICINE

## 2019-12-10 PROCEDURE — 99999 PR PBB SHADOW E&M-EST. PATIENT-LVL IV: CPT | Mod: PBBFAC,,, | Performed by: INTERNAL MEDICINE

## 2019-12-10 PROCEDURE — 1159F PR MEDICATION LIST DOCUMENTED IN MEDICAL RECORD: ICD-10-PCS | Mod: S$GLB,,, | Performed by: INTERNAL MEDICINE

## 2019-12-10 PROCEDURE — 3078F DIAST BP <80 MM HG: CPT | Mod: CPTII,S$GLB,, | Performed by: INTERNAL MEDICINE

## 2019-12-10 PROCEDURE — 3075F PR MOST RECENT SYSTOLIC BLOOD PRESS GE 130-139MM HG: ICD-10-PCS | Mod: CPTII,S$GLB,, | Performed by: INTERNAL MEDICINE

## 2019-12-10 PROCEDURE — 1101F PR PT FALLS ASSESS DOC 0-1 FALLS W/OUT INJ PAST YR: ICD-10-PCS | Mod: CPTII,S$GLB,, | Performed by: INTERNAL MEDICINE

## 2019-12-10 PROCEDURE — 1159F MED LIST DOCD IN RCRD: CPT | Mod: S$GLB,,, | Performed by: INTERNAL MEDICINE

## 2019-12-10 PROCEDURE — 99214 PR OFFICE/OUTPT VISIT, EST, LEVL IV, 30-39 MIN: ICD-10-PCS | Mod: S$GLB,,, | Performed by: INTERNAL MEDICINE

## 2019-12-10 PROCEDURE — 1101F PT FALLS ASSESS-DOCD LE1/YR: CPT | Mod: CPTII,S$GLB,, | Performed by: INTERNAL MEDICINE

## 2019-12-10 RX ORDER — EPINEPHRINE 0.3 MG/.3ML
1 INJECTION SUBCUTANEOUS ONCE
Qty: 0.3 ML | Refills: 0 | Status: SHIPPED | OUTPATIENT
Start: 2019-12-10 | End: 2021-07-21 | Stop reason: SDUPTHER

## 2019-12-10 NOTE — PATIENT INSTRUCTIONS
Please call the Mental Health Department at [phone] to schedule an appointment with a psychiatrist.

## 2019-12-10 NOTE — PROGRESS NOTES
"Subjective:       Patient ID: Lorena Contreras is a 69 y.o. female.    Chief Complaint: Hospital Follow Up    HPI    Last visit with me over 1 year ago.  Over the last 6 months seen by Cardiology.  Had admission to the hospital for observation for chest pain 3 weeks ago.  Upcoming appointment with Cardiology.  Seen by Cardiology yesterday.  At that visit restarted Imdur, increased atenolol.    Reports stable angina, especially when more nervous. Has been on Prozac, changed to 2 tabs daily but felt more drowsy, went back to 1 tab daily.    Pt reports frequent hives. Doesn't have epipen. Hasn't been seen by allergy in the past.    Review of Systems   All other systems reviewed and are negative.      Objective:      Physical Exam   Constitutional: She is oriented to person, place, and time. No distress.   HENT:   Mouth/Throat: Oropharynx is clear and moist. No oropharyngeal exudate.   Neck: Normal range of motion. No thyromegaly present.   Cardiovascular: Normal rate, regular rhythm and normal heart sounds.   Pulmonary/Chest: Effort normal and breath sounds normal. No stridor. She has no wheezes. She has no rales.   Abdominal: Soft. There is no tenderness. There is no guarding.   Musculoskeletal: She exhibits no edema or tenderness.   Lymphadenopathy:     She has no cervical adenopathy.   Neurological: She is alert and oriented to person, place, and time.   Skin: She is not diaphoretic.   Psychiatric: Her behavior is normal. Her mood appears anxious.   Nursing note and vitals reviewed.      Vitals:    12/10/19 1453 12/10/19 1512   BP: (!) 140/66 134/68   BP Location: Right arm Right arm   Patient Position: Sitting Sitting   BP Method: Medium (Manual) Medium (Manual)   Pulse: 86    Weight: 81.6 kg (180 lb)    Height: 5' 10" (1.778 m)      Body mass index is 25.83 kg/m².    Reviewed PMH, PSH, SH, FH, allergies, and medications.     RESULTS: Reviewed labs from last 6 months    Assessment:       1. Generalized anxiety " disorder    2. Panic attacks    3. Hives    4. Hypertension associated with diabetes    5. Uncontrolled type 2 diabetes mellitus with diabetic polyneuropathy, with long-term current use of insulin    6. Colon cancer screening    7. CKD stage 3 secondary to diabetes        Plan:   Lorena was seen today for hospital follow up.    Diagnoses and all orders for this visit:    Generalized anxiety disorder:  Prior dx, persists, severe. increased Prozac caused drowsiness. Pt wants sedative medications; instructed her to establish with Psychiatry.  -     Ambulatory consult to Psychiatry    Panic attacks:  Prior dx, persists. Refer to Psychiatry for further management.  -     Ambulatory consult to Psychiatry    Hives:  Prior dx, reports she gets hives frequently. Refer to Allergy for evaluation.  -     Ambulatory consult to Allergy  -     EPINEPHrine (EPIPEN) 0.3 mg/0.3 mL AtIn; Inject 0.3 mLs (0.3 mg total) into the muscle once. for 1 dose    Hypertension associated with diabetes:  Prior diagnosis, stable, well controlled on current management. No changes at this time, will continue to monitor.     Uncontrolled type 2 diabetes mellitus with diabetic polyneuropathy, with long-term current use of insulin:  Prior dx, improving recently, continue follow up with Endocrinology for evaluation.    Colon cancer screening:  recommended colonoscopy given history of polyps; pt refused, agrees to do FOBT for CRC screening.  -     Fecal Immunochemical Test (iFOBT); Future    ckd stage 3 2/2 DM:  Prior diagnosis, stable, well controlled on current management. No changes at this time, will continue to monitor.     Follow up in about 6 months (around 6/10/2020) for follow up visit.  Kenneth Luu MD  Internal Medicine    Portions of this note were completed using medical dictation software. Please excuse typographical or syntax errors that were missed on review.

## 2019-12-14 DIAGNOSIS — E11.59 HYPERTENSION ASSOCIATED WITH DIABETES: ICD-10-CM

## 2019-12-14 DIAGNOSIS — I15.2 HYPERTENSION ASSOCIATED WITH DIABETES: ICD-10-CM

## 2019-12-17 RX ORDER — HYDROCHLOROTHIAZIDE 25 MG/1
TABLET ORAL
Refills: 0 | OUTPATIENT
Start: 2019-12-17

## 2019-12-27 ENCOUNTER — PATIENT OUTREACH (OUTPATIENT)
Dept: ADMINISTRATIVE | Facility: OTHER | Age: 69
End: 2019-12-27

## 2019-12-30 ENCOUNTER — OFFICE VISIT (OUTPATIENT)
Dept: CARDIOLOGY | Facility: CLINIC | Age: 69
End: 2019-12-30
Payer: MEDICARE

## 2019-12-30 VITALS
BODY MASS INDEX: 26.4 KG/M2 | SYSTOLIC BLOOD PRESSURE: 140 MMHG | RESPIRATION RATE: 15 BRPM | OXYGEN SATURATION: 98 % | WEIGHT: 184.44 LBS | DIASTOLIC BLOOD PRESSURE: 64 MMHG | HEIGHT: 70 IN | HEART RATE: 96 BPM

## 2019-12-30 DIAGNOSIS — I25.118 CORONARY ARTERY DISEASE OF NATIVE ARTERY OF NATIVE HEART WITH STABLE ANGINA PECTORIS: Primary | ICD-10-CM

## 2019-12-30 DIAGNOSIS — I25.10 CORONARY ARTERY DISEASE, ANGINA PRESENCE UNSPECIFIED, UNSPECIFIED VESSEL OR LESION TYPE, UNSPECIFIED WHETHER NATIVE OR TRANSPLANTED HEART: ICD-10-CM

## 2019-12-30 PROCEDURE — 99215 PR OFFICE/OUTPT VISIT, EST, LEVL V, 40-54 MIN: ICD-10-PCS | Mod: 25,S$GLB,, | Performed by: INTERNAL MEDICINE

## 2019-12-30 PROCEDURE — 1101F PT FALLS ASSESS-DOCD LE1/YR: CPT | Mod: CPTII,S$GLB,, | Performed by: INTERNAL MEDICINE

## 2019-12-30 PROCEDURE — 3077F SYST BP >= 140 MM HG: CPT | Mod: CPTII,S$GLB,, | Performed by: INTERNAL MEDICINE

## 2019-12-30 PROCEDURE — 1159F MED LIST DOCD IN RCRD: CPT | Mod: S$GLB,,, | Performed by: INTERNAL MEDICINE

## 2019-12-30 PROCEDURE — 99215 OFFICE O/P EST HI 40 MIN: CPT | Mod: 25,S$GLB,, | Performed by: INTERNAL MEDICINE

## 2019-12-30 PROCEDURE — 3078F DIAST BP <80 MM HG: CPT | Mod: CPTII,S$GLB,, | Performed by: INTERNAL MEDICINE

## 2019-12-30 PROCEDURE — 99999 PR PBB SHADOW E&M-EST. PATIENT-LVL III: ICD-10-PCS | Mod: PBBFAC,,, | Performed by: INTERNAL MEDICINE

## 2019-12-30 PROCEDURE — 1126F AMNT PAIN NOTED NONE PRSNT: CPT | Mod: S$GLB,,, | Performed by: INTERNAL MEDICINE

## 2019-12-30 PROCEDURE — 99999 PR PBB SHADOW E&M-EST. PATIENT-LVL III: CPT | Mod: PBBFAC,,, | Performed by: INTERNAL MEDICINE

## 2019-12-30 PROCEDURE — 1159F PR MEDICATION LIST DOCUMENTED IN MEDICAL RECORD: ICD-10-PCS | Mod: S$GLB,,, | Performed by: INTERNAL MEDICINE

## 2019-12-30 PROCEDURE — 3077F PR MOST RECENT SYSTOLIC BLOOD PRESSURE >= 140 MM HG: ICD-10-PCS | Mod: CPTII,S$GLB,, | Performed by: INTERNAL MEDICINE

## 2019-12-30 PROCEDURE — 3078F PR MOST RECENT DIASTOLIC BLOOD PRESSURE < 80 MM HG: ICD-10-PCS | Mod: CPTII,S$GLB,, | Performed by: INTERNAL MEDICINE

## 2019-12-30 PROCEDURE — 1126F PR PAIN SEVERITY QUANTIFIED, NO PAIN PRESENT: ICD-10-PCS | Mod: S$GLB,,, | Performed by: INTERNAL MEDICINE

## 2019-12-30 PROCEDURE — 1101F PR PT FALLS ASSESS DOC 0-1 FALLS W/OUT INJ PAST YR: ICD-10-PCS | Mod: CPTII,S$GLB,, | Performed by: INTERNAL MEDICINE

## 2019-12-30 RX ORDER — SODIUM CHLORIDE 9 MG/ML
INJECTION, SOLUTION INTRAVENOUS CONTINUOUS
Status: CANCELLED | OUTPATIENT
Start: 2019-12-30

## 2019-12-30 RX ORDER — FAMOTIDINE 10 MG/1
20 TABLET ORAL 2 TIMES DAILY
Status: CANCELLED | OUTPATIENT
Start: 2020-01-07 | End: 2020-01-08

## 2019-12-30 RX ORDER — DIPHENHYDRAMINE HCL 25 MG
50 CAPSULE ORAL EVERY 6 HOURS
Status: CANCELLED | OUTPATIENT
Start: 2020-01-07 | End: 2020-01-07

## 2019-12-30 RX ORDER — PREDNISONE 10 MG/1
50 TABLET ORAL SEE ADMIN INSTRUCTIONS
Status: CANCELLED | OUTPATIENT
Start: 2020-01-07

## 2019-12-30 NOTE — PROGRESS NOTES
CARDIOVASCULAR CONSULTATION    REASON FOR CONSULT:   Lorena Contreras is a 69 y.o. female who presents for evaluation of PCI.      HISTORY OF PRESENT ILLNESS:     Patient is a pleasant 69-year-old lady.  Past medical history significant for coronary artery disease status post PCI of RCA.  Underwent recent cardiac catheterization by Dr. Rico which showed a 90% mid high OM1/ramus lesion as well as mid circ long 80-90% diffusely disease stenosis.  Patient was put on Imdur or for a trial of medical management, however could not tolerate the Imdur or.  States his lower cheek to Imdur or and has to take Benadryl with the Imdur or.  Continues having chest pains.  Denies orthopnea, PND, swelling feet.  Has been referred by Dr. Rico to me for further evaluation and consideration of PCI.    Cardiac catheterization November 2019:  Films were personally reviewed by me.    · Patent RCA mid stents with 50% lesion after stents  · Prox Cx to Dist Cx lesion , 95% stenosed.  · Ost 1st Mrg to 1st Mrg lesion , 90% stenosed.  · LVEDP (Pre): 16    Echo November 2019:  · Normal left ventricular systolic function. The estimated ejection fraction is 60%  · Concentric left ventricular hypertrophy.  · Grade II (moderate) left ventricular diastolic dysfunction consistent with pseudonormalization.  · Normal right ventricular systolic function.  · Moderate left atrial enlargement.  · Mild tricuspid regurgitation.  · The estimated PA systolic pressure is 50 mm Hg  · Pulmonary hypertension present.      PAST MEDICAL HISTORY:     Past Medical History:   Diagnosis Date    Anxiety     Colon polyps     Coronary artery disease     Depression     Diabetes mellitus, type II     Fibromyalgia     Follicular lymphoma     HTN (hypertension)     MI (myocardial infarction) 03/2019    Restless leg syndrome        PAST SURGICAL HISTORY:     Past Surgical History:   Procedure Laterality Date    COLONOSCOPY  11/07/2012    Colon polyp found;  "repeat in 5 years    ELBOW SURGERY Right     dislocation repair     ESOPHAGOGASTRODUODENOSCOPY  11/07/2012    atrophic gastritis, H pylori testing negative    KNEE SURGERY Bilateral     scoped    LEFT HEART CATHETERIZATION Left 3/29/2019    Procedure: Left heart cath;  Surgeon: Bladimir Barbosa MD;  Location: [facility] CATH LAB;  Service: Cardiology;  Laterality: Left;    LEFT HEART CATHETERIZATION Left 11/18/2019    Procedure: Left heart cath;  Surgeon: Karl Rico MD;  Location: [facility] CATH LAB;  Service: Cardiology;  Laterality: Left;       ALLERGIES AND MEDICATION:     Review of patient's allergies indicates:   Allergen Reactions    Novolin 70/30 (semi-synthetic) Nausea And Vomiting     Severe vomiting on Relion 70/30    Shellfish containing products Swelling    Sulfa (sulfonamide antibiotics) Anaphylaxis    Victoza [liraglutide] Nausea And Vomiting    Glipizide Nausea Only    Asa [aspirin] Hives    Citric acid     Codeine     Egg derived     Iodine and iodide containing products     Rituxan [rituximab]     Soy     Talwin [pentazocine lactate]     Zoloft [sertraline]         Medication List           Accurate as of December 30, 2019  3:35 PM. If you have any questions, ask your nurse or doctor.               CHANGE how you take these medications    tolnaftate 1 % cream  Commonly known as:  TINACTIN  Apply topically 2 (two) times daily.  What changed:    · when to take this  · reasons to take this        CONTINUE taking these medications    amLODIPine 10 MG tablet  Commonly known as:  NORVASC  TAKE ONE TABLET BY MOUTH ONCE DAILY     atenolol 50 MG tablet  Commonly known as:  TENORMIN  Take 1 tablet (50 mg total) by mouth 2 (two) times daily.     atorvastatin 80 MG tablet  Commonly known as:  LIPITOR  Take 1 tablet (80 mg total) by mouth every evening.     bismuth tribrom-petrolatum,wh 5 X 9 " Bndg  Commonly known as:  Xeroform  Apply dressing to wound daily     EPINEPHrine 0.3 mg/0.3 mL " "Atin  Commonly known as:  EPIPEN  Inject 0.3 mLs (0.3 mg total) into the muscle once. for 1 dose     esomeprazole 20 MG capsule  Commonly known as:  NEXIUM     FLUoxetine 20 MG capsule  Take 1 capsule (20 mg total) by mouth once daily.     FreeStyle Test Strp  Generic drug:  blood sugar diagnostic     furosemide 20 MG tablet  Commonly known as:  LASIX  Take 1 tablet (20 mg total) by mouth daily as needed (leg swelling).     hydroCHLOROthiazide 25 MG tablet  Commonly known as:  HYDRODIURIL  TAKE 1 TABLET BY MOUTH ONCE DAILY     hydrOXYzine HCl 10 MG Tab  Commonly known as:  ATARAX  Take 1 tablet (10 mg total) by mouth 3 (three) times daily as needed.     insulin aspart protamine-insulin aspart 100 unit/mL (70-30) Inpn pen  Commonly known as:  NovoLOG Mix 70-30FlexPen U-100  Inject 30 Units into the skin 2 (two) times daily before meals.     isosorbide mononitrate 30 MG 24 hr tablet  Commonly known as:  IMDUR  Take 1 tablet (30 mg total) by mouth once daily.     Januvia 100 MG Tab  Generic drug:  SITagliptin  TAKE 1 TABLET BY MOUTH ONCE DAILY     lancets 32 gauge Misc  1 lancet by Misc.(Non-Drug; Combo Route) route 4 (four) times daily.     magnesium oxide 400 mg (241.3 mg magnesium) tablet  Commonly known as:  MAG-OX     meclizine 25 mg tablet  Commonly known as:  ANTIVERT  Take 1 tablet (25 mg total) by mouth 3 (three) times daily as needed.     metFORMIN 1000 MG tablet  Commonly known as:  GLUCOPHAGE  TAKE 1 TABLET BY MOUTH TWICE DAILY WITH MEALS     nitroGLYCERIN 0.4 MG SL tablet  Commonly known as:  NITROSTAT  Place 1 tablet (0.4 mg total) under the tongue every 5 (five) minutes as needed for Chest pain.     pantoprazole 40 MG tablet  Commonly known as:  PROTONIX  Take 1 tablet (40 mg total) by mouth once daily.     pen needle, diabetic 32 gauge x 5/32" Ndle  Use with Novolog Mix Flexpens     ticagrelor 90 mg tablet  Commonly known as:  BRILINTA  Take 1 tablet (90 mg total) by mouth 2 (two) times daily.   "   triamcinolone acetonide 0.1% 0.1 % cream  Commonly known as:  KENALOG  APPLY  CREAM EXTERNALLY TO AFFECTED AREA TWICE DAILY AS NEEDED     Vitamin D3 50 mcg (2,000 unit) Cap  Generic drug:  cholecalciferol (vitamin D3)            SOCIAL HISTORY:     Social History     Socioeconomic History    Marital status:      Spouse name: Not on file    Number of children: Not on file    Years of education: Not on file    Highest education level: Not on file   Occupational History    Not on file   Social Needs    Financial resource strain: Not on file    Food insecurity:     Worry: Not on file     Inability: Not on file    Transportation needs:     Medical: Not on file     Non-medical: Not on file   Tobacco Use    Smoking status: Never Smoker    Smokeless tobacco: Never Used   Substance and Sexual Activity    Alcohol use: No    Drug use: No    Sexual activity: Not Currently   Lifestyle    Physical activity:     Days per week: Not on file     Minutes per session: Not on file    Stress: Not on file   Relationships    Social connections:     Talks on phone: Not on file     Gets together: Not on file     Attends Mu-ism service: Not on file     Active member of club or organization: Not on file     Attends meetings of clubs or organizations: Not on file     Relationship status: Not on file   Other Topics Concern    Not on file   Social History Narrative    Not on file       FAMILY HISTORY:     Family History   Problem Relation Age of Onset    Cancer Mother     Heart disease Mother     Cancer Father         lung    Diabetes Sister     Hypertension Sister     Stroke Neg Hx     Hyperlipidemia Neg Hx        REVIEW OF SYSTEMS:   Review of Systems   Constitution: Negative.   HENT: Negative.    Eyes: Negative.    Cardiovascular: Positive for chest pain.   Respiratory: Negative.    Endocrine: Negative.    Hematologic/Lymphatic: Negative.    Skin: Negative.    Musculoskeletal: Negative.    Gastrointestinal:  "Negative.    Genitourinary: Negative.    Neurological: Negative.    Psychiatric/Behavioral: Negative.    Allergic/Immunologic: Negative.        A 10 point review of systems was performed and all the pertinent positives have been mentioned. Rest of review of systems was negative.        PHYSICAL EXAM:     Vitals:    12/30/19 1515   BP: (!) 140/64   Pulse: 96   Resp: 15    Body mass index is 26.46 kg/m².  Weight: 83.7 kg (184 lb 6.6 oz)   Height: 5' 10" (177.8 cm)     Physical Exam   Constitutional: She is oriented to person, place, and time. She appears well-developed and well-nourished.   HENT:   Head: Normocephalic.   Eyes: Pupils are equal, round, and reactive to light.   Neck: Normal range of motion. Neck supple.   Cardiovascular: Normal rate and regular rhythm.   Pulmonary/Chest: Effort normal and breath sounds normal.   Abdominal: Soft. Normal appearance and bowel sounds are normal. There is no tenderness.   Musculoskeletal: Normal range of motion.   Neurological: She is alert and oriented to person, place, and time.   Skin: Skin is warm.   Psychiatric: She has a normal mood and affect.         DATA:     Laboratory:  CBC:  Recent Labs   Lab 05/09/19  1042 11/17/19  1407 11/18/19  1121   WBC 11.95 12.43 12.04   Hemoglobin 11.6 L 11.6 L 10.9 L   Hematocrit 36.2 L 36.4 L 34.6 L   Platelets 247 251 206       CHEMISTRIES:  Recent Labs   Lab 03/31/19  0443 04/09/19  1237  05/09/19  1042 11/17/19  1407 11/18/19  0501 11/18/19  1121   Glucose 268 H 165 H   < > 267 H 305 H 207 H 331 H   Sodium 137 138   < > 138 135 L 140 137   Potassium 4.2 3.8   < > 4.0 4.4 4.4 4.3   BUN, Bld 31 H 22   < > 22 24 H 22 20   Creatinine 1.3 1.1   < > 1.2 1.2 1.0 1.1   eGFR if African American 49 A 60   < > 54 A 53 A >60 59 A   eGFR if non  42 A 52 A   < > 47 A 46 A 58 A 51 A   Calcium 10.3 10.6 H   < > 9.8 10.1 10.0 9.9   Magnesium 1.5 L 1.4 L  --  1.1 L  --   --   --     < > = values in this interval not displayed. "       CARDIAC BIOMARKERS:  Recent Labs   Lab 11/17/19  2034 11/17/19  2253 11/18/19  0502   Troponin I 0.015 0.012 0.013       COAGS:  Recent Labs   Lab 04/09/19  1237 11/17/19  1407 11/18/19  1121   INR 1.0 1.0 1.0       LIPIDS/LFTS:  Recent Labs   Lab 12/05/17  1003  04/10/19  0524 05/09/19  1042 11/17/19  1407 11/18/19  0501   Cholesterol 201 H  --   --   --   --  85 L   Triglycerides 239 H  --   --   --   --  146   HDL 38 L  --   --   --   --  31 L   LDL Cholesterol 115.2  --   --   --   --  24.8 L   Non-HDL Cholesterol 163  --   --   --   --  54   AST  --    < > 29 38 38  --    ALT  --    < > 24 33 34  --     < > = values in this interval not displayed.       Hemoglobin A1C   Date Value Ref Range Status   11/17/2019 7.5 (H) 4.0 - 5.6 % Final     Comment:     ADA Screening Guidelines:  5.7-6.4%  Consistent with prediabetes  >or=6.5%  Consistent with diabetes  High levels of fetal hemoglobin interfere with the HbA1C  assay. Heterozygous hemoglobin variants (HbS, HgC, etc)do  not significantly interfere with this assay.   However, presence of multiple variants may affect accuracy.     03/28/2019 9.7 (H) 4.0 - 5.6 % Final     Comment:     ADA Screening Guidelines:  5.7-6.4%  Consistent with prediabetes  >or=6.5%  Consistent with diabetes  High levels of fetal hemoglobin interfere with the HbA1C  assay. Heterozygous hemoglobin variants (HbS, HgC, etc)do  not significantly interfere with this assay.   However, presence of multiple variants may affect accuracy.     11/26/2018 8.9 (H) 4.0 - 5.6 % Final     Comment:     ADA Screening Guidelines:  5.7-6.4%  Consistent with prediabetes  >or=6.5%  Consistent with diabetes  High levels of fetal hemoglobin interfere with the HbA1C  assay. Heterozygous hemoglobin variants (HbS, HgC, etc)do  not significantly interfere with this assay.   However, presence of multiple variants may affect accuracy.     11/26/2018 8.9 (H) 4.0 - 5.6 % Final     Comment:     ADA Screening  Guidelines:  5.7-6.4%  Consistent with prediabetes  >or=6.5%  Consistent with diabetes  High levels of fetal hemoglobin interfere with the HbA1C  assay. Heterozygous hemoglobin variants (HbS, HgC, etc)do  not significantly interfere with this assay.   However, presence of multiple variants may affect accuracy.         TSH  Recent Labs   Lab 04/09/19  1237 05/09/19  1042 11/18/19  0501   TSH 0.371 L 0.626 0.733       The ASCVD Risk score (Gabriella MARYBETH Jr., et al., 2013) failed to calculate for the following reasons:    The patient has a prior MI or stroke diagnosis             ASSESSMENT AND PLAN     Patient Active Problem List   Diagnosis    Myalgia and myositis, unspecified    Dysphagia    Anxiety    Hypertension associated with diabetes    History of non-Hodgkin's lymphoma    Palpitations    Fibromyalgia    Hyperlipidemia associated with type 2 diabetes mellitus    Leg edema, left    Cardiomegaly    Diabetic peripheral neuropathy associated with type 2 diabetes mellitus    Uncontrolled type 2 diabetes mellitus with diabetic polyneuropathy, with long-term current use of insulin    History of follicular lymphoma    Proliferative diabetic retinopathy of left eye associated with type 2 diabetes mellitus    Persistent microalbuminuria associated with type 2 diabetes mellitus    Acute inferior myocardial infarction    Coronary artery disease involving native coronary artery of native heart    Hypomagnesemia    Chest pain    CKD stage 3 secondary to diabetes       1.  Coronary artery disease:  Patient with severe coronary artery disease.  Lifestyle limiting symptoms despite medical management.  Unable to tolerate Imdur .  Has been referred by my partner Dr. Rico for consideration of PCI.  I had a detailed discussion with the patient regarding risks benefits of PCI versus continued medical management.  After detailed discussion we decided to proceed with PCI.    She is aspirin allergic.  We will admit her  1 day prior to PCI for aspirin desensitization.    She has iodine allergy check.  When she is admitted for aspirin desensitization will also pre treat her for iodine allergy.    Continue Brilinta.  Continue statins.    Hypertension:  well controlled on current therapy.    Risks, benefits and alternatives of the catheterization procedure were discussed with the patient.The risks of coronary angiography include but are not limited to: bleeding, infection, death, heart attack, arrhythmia, kidney injury or failure, potential need for dialysis, allergic reactions, stroke, need for emergency surgery, hematoma, pseudoaneurysm etc.  Should stenting be indicated, the patient has agreed to dual anti-platelet therapy for 1-consecutive year with a drug-eluting stent and a minimum of 1-month with the use of a bare metal stent. Additionally, pt is aware that non-compliance is likely to result in stent clotting with heart attack, heart failure, and/or death  The risks of moderate sedation include hypotension, respiratory depression, arrhythmias, bronchospasm, and death. Informed consent was obtained and the  patient is agreeable to proceed with the procedure. Consent was placed on the chart.              Thank you very much for involving me in the care of your patient.  Please do not hesitate to contact me if there are any questions.      Christos Monreal MD, FACC, [facility]  Interventional Cardiologist, [facility].           This note was dictated with the help of speech recognition software.  There might be un-intended errors and/or substitutions.

## 2020-01-06 ENCOUNTER — HOSPITAL ENCOUNTER (OUTPATIENT)
Dept: PREADMISSION TESTING | Facility: HOSPITAL | Age: 70
Discharge: HOME | End: 2020-01-06
Attending: INTERNAL MEDICINE
Payer: MEDICARE

## 2020-01-06 VITALS
BODY MASS INDEX: 26.45 KG/M2 | SYSTOLIC BLOOD PRESSURE: 130 MMHG | HEIGHT: 70 IN | HEART RATE: 90 BPM | TEMPERATURE: 100 F | DIASTOLIC BLOOD PRESSURE: 66 MMHG | OXYGEN SATURATION: 98 % | WEIGHT: 184.75 LBS | RESPIRATION RATE: 16 BRPM

## 2020-01-06 DIAGNOSIS — I25.118 CORONARY ARTERY DISEASE OF NATIVE ARTERY OF NATIVE HEART WITH STABLE ANGINA PECTORIS: ICD-10-CM

## 2020-01-06 LAB
ANION GAP SERPL CALC-SCNC: 9 MMOL/L (ref 8–16)
BASOPHILS # BLD AUTO: 0.08 K/UL (ref 0–0.2)
BASOPHILS NFR BLD: 0.7 % (ref 0–1.9)
BUN SERPL-MCNC: 23 MG/DL (ref 8–23)
CALCIUM SERPL-MCNC: 9.5 MG/DL (ref 8.7–10.5)
CHLORIDE SERPL-SCNC: 103 MMOL/L (ref 95–110)
CO2 SERPL-SCNC: 25 MMOL/L (ref 23–29)
CREAT SERPL-MCNC: 1.4 MG/DL (ref 0.5–1.4)
DIFFERENTIAL METHOD: ABNORMAL
EOSINOPHIL # BLD AUTO: 0.2 K/UL (ref 0–0.5)
EOSINOPHIL NFR BLD: 2 % (ref 0–8)
ERYTHROCYTE [DISTWIDTH] IN BLOOD BY AUTOMATED COUNT: 13.5 % (ref 11.5–14.5)
EST. GFR  (AFRICAN AMERICAN): 44 ML/MIN/1.73 M^2
EST. GFR  (NON AFRICAN AMERICAN): 38 ML/MIN/1.73 M^2
GLUCOSE SERPL-MCNC: 427 MG/DL (ref 70–110)
HCT VFR BLD AUTO: 33.6 % (ref 37–48.5)
HGB BLD-MCNC: 10.7 G/DL (ref 12–16)
IMM GRANULOCYTES # BLD AUTO: 0.06 K/UL (ref 0–0.04)
IMM GRANULOCYTES NFR BLD AUTO: 0.5 % (ref 0–0.5)
LYMPHOCYTES # BLD AUTO: 1.9 K/UL (ref 1–4.8)
LYMPHOCYTES NFR BLD: 16.3 % (ref 18–48)
MCH RBC QN AUTO: 27.6 PG (ref 27–31)
MCHC RBC AUTO-ENTMCNC: 31.8 G/DL (ref 32–36)
MCV RBC AUTO: 87 FL (ref 82–98)
MONOCYTES # BLD AUTO: 0.8 K/UL (ref 0.3–1)
MONOCYTES NFR BLD: 6.4 % (ref 4–15)
NEUTROPHILS # BLD AUTO: 8.8 K/UL (ref 1.8–7.7)
NEUTROPHILS NFR BLD: 74.1 % (ref 38–73)
NRBC BLD-RTO: 0 /100 WBC
PLATELET # BLD AUTO: 256 K/UL (ref 150–350)
PMV BLD AUTO: 13 FL (ref 9.2–12.9)
POTASSIUM SERPL-SCNC: 4.9 MMOL/L (ref 3.5–5.1)
RBC # BLD AUTO: 3.87 M/UL (ref 4–5.4)
SODIUM SERPL-SCNC: 137 MMOL/L (ref 136–145)
WBC # BLD AUTO: 11.83 K/UL (ref 3.9–12.7)

## 2020-01-06 PROCEDURE — 36415 COLL VENOUS BLD VENIPUNCTURE: CPT | Mod: HCNC

## 2020-01-06 PROCEDURE — 80048 BASIC METABOLIC PNL TOTAL CA: CPT | Mod: HCNC

## 2020-01-06 PROCEDURE — 85025 COMPLETE CBC W/AUTO DIFF WBC: CPT | Mod: HCNC

## 2020-01-06 NOTE — DISCHARGE INSTRUCTIONS
"  Your surgery is scheduled for _Tuesday Jan. 7, 2020  Go to Patient Admission 9:00 AM___________________.    Call [phone] between 2 p.m. and 5 p.m. on   _______________ to find out your arrival time for the day of your surgery.      Please report to SAME DAY SURGERY UNIT on the 2nd FLOOR at _______ a.m.  Use front door entrance. The doors open at 0530 am.      If you need WHEELCHAIR assistance please call  [phone] from your cell phone or "0"  from the  hospital courtesy phone located to the right after you enter the hospital lobby.      INSTRUCTIONS IMPORTANT!!!  ¨ Do not eat or drink after 12 midnight-including water. OK to brush teeth, no   gum, candy or mints!    ¨ Take only these medicines with a small swallow of water-morning of surgery.  Take Amlodipine, Atenolol and Isosorbide morning of procedure.      _x___  Prep instructions:    SHOWER    _x___  Please shower using Hibiclens soap the night before AND  the morning of  your surgery/procedure. Do not use Hibiclens on your face or genitals        x       If your surgery is around your belly button (Navel) be sure to wash inside your  belly button also. Rinse hibiclens off completely.  _x___  No shaving of procedural area at least 4-5 days before surgery due to  increased risk of skin irritation and/or possible infection.  _x___  Do not wear makeup, including mascara. WEARING EYE MAKEUP MAY  LEAD TO SERIOUS EYE INJURY during surgery.  _x___  No powder, lotions or creams to your body.  _x___  You may wear only deodorant on the day of surgery.  _x___  Please remove all jewelry, including piercings and leave at home.  _x___  No money or valuables needed. Please leave at home.  You may bring your  cell phone.  ____  Please bring any documents given by your doctor.  _x___  If going home the same day, arrange for a ride home. You will not be able to   drive if Anesthesia was used.  _x___  Children under 18 years require a parent / guardian present the entire time "   they are in surgery / recovery.  _x___  Wear loose fitting clothing. Allow for dressings, bandages.  _x___  Stop Aspirin, Ibuprofen, Motrin and Aleve at least 3-5 days before   surgery, unless otherwise instructed by your doctor, or the nurse.              You MAY use Tylenol/acetaminophen until day of surgery.  _x___  If you take diabetic medication, do not take am of surgery unless instructed by   Doctor.  _x___  Call MD for temperature above 101 degrees.        _x___ Stop taking any Fish Oil supplement or any Vitamins that contain Vitamin E at least 5 days prior to surgery.          I have read or had read and explained to me, and understand the above information.  Additional comments or instructions:Please call   [phone] if you have any questions regarding the instructions above.

## 2020-01-07 ENCOUNTER — HOSPITAL ENCOUNTER (OUTPATIENT)
Facility: HOSPITAL | Age: 70
Discharge: HOME | End: 2020-01-09
Attending: INTERNAL MEDICINE | Admitting: INTERNAL MEDICINE
Payer: MEDICARE

## 2020-01-07 DIAGNOSIS — I25.10 CAD (CORONARY ARTERY DISEASE): ICD-10-CM

## 2020-01-07 DIAGNOSIS — I25.118 CORONARY ARTERY DISEASE OF NATIVE ARTERY OF NATIVE HEART WITH STABLE ANGINA PECTORIS: ICD-10-CM

## 2020-01-07 DIAGNOSIS — I25.10 CORONARY ARTERY DISEASE, ANGINA PRESENCE UNSPECIFIED, UNSPECIFIED VESSEL OR LESION TYPE, UNSPECIFIED WHETHER NATIVE OR TRANSPLANTED HEART: ICD-10-CM

## 2020-01-07 LAB
POCT GLUCOSE: 172 MG/DL (ref 70–110)
POCT GLUCOSE: 217 MG/DL (ref 70–110)
POCT GLUCOSE: 241 MG/DL (ref 70–110)
POCT GLUCOSE: 277 MG/DL (ref 70–110)

## 2020-01-07 PROCEDURE — 63600175 PHARM REV CODE 636 W HCPCS: Mod: HCNC | Performed by: INTERNAL MEDICINE

## 2020-01-07 PROCEDURE — G0378 HOSPITAL OBSERVATION PER HR: HCPCS | Mod: HCNC

## 2020-01-07 PROCEDURE — G0379 DIRECT REFER HOSPITAL OBSERV: HCPCS | Mod: HCNC

## 2020-01-07 PROCEDURE — 99220 PR INITIAL OBSERVATION CARE,LEVL III: CPT | Mod: HCNC,,, | Performed by: INTERNAL MEDICINE

## 2020-01-07 PROCEDURE — 99220 PR INITIAL OBSERVATION CARE,LEVL III: ICD-10-PCS | Mod: HCNC,,, | Performed by: INTERNAL MEDICINE

## 2020-01-07 PROCEDURE — 25000003 PHARM REV CODE 250: Mod: HCNC | Performed by: INTERNAL MEDICINE

## 2020-01-07 RX ORDER — SODIUM CHLORIDE 9 MG/ML
INJECTION, SOLUTION INTRAVENOUS CONTINUOUS
Status: DISCONTINUED | OUTPATIENT
Start: 2020-01-07 | End: 2020-01-07

## 2020-01-07 RX ORDER — IBUPROFEN 200 MG
24 TABLET ORAL
Status: DISCONTINUED | OUTPATIENT
Start: 2020-01-07 | End: 2020-01-08

## 2020-01-07 RX ORDER — ASPIRIN 325 MG
0.1 TABLET ORAL ONCE
Status: COMPLETED | OUTPATIENT
Start: 2020-01-07 | End: 2020-01-07

## 2020-01-07 RX ORDER — FAMOTIDINE 20 MG/1
20 TABLET, FILM COATED ORAL EVERY 6 HOURS
Status: COMPLETED | OUTPATIENT
Start: 2020-01-07 | End: 2020-01-08

## 2020-01-07 RX ORDER — FUROSEMIDE 20 MG/1
20 TABLET ORAL DAILY PRN
Status: DISCONTINUED | OUTPATIENT
Start: 2020-01-07 | End: 2020-01-09 | Stop reason: HOSPADM

## 2020-01-07 RX ORDER — TALC
6 POWDER (GRAM) TOPICAL NIGHTLY PRN
Status: DISCONTINUED | OUTPATIENT
Start: 2020-01-07 | End: 2020-01-09 | Stop reason: HOSPADM

## 2020-01-07 RX ORDER — ASPIRIN 325 MG
10 TABLET ORAL ONCE
Status: COMPLETED | OUTPATIENT
Start: 2020-01-07 | End: 2020-01-07

## 2020-01-07 RX ORDER — MECLIZINE HYDROCHLORIDE 25 MG/1
25 TABLET ORAL 3 TIMES DAILY PRN
Status: DISCONTINUED | OUTPATIENT
Start: 2020-01-07 | End: 2020-01-09 | Stop reason: HOSPADM

## 2020-01-07 RX ORDER — ATORVASTATIN CALCIUM 40 MG/1
80 TABLET, FILM COATED ORAL NIGHTLY
Status: DISCONTINUED | OUTPATIENT
Start: 2020-01-07 | End: 2020-01-09 | Stop reason: HOSPADM

## 2020-01-07 RX ORDER — AMLODIPINE BESYLATE 5 MG/1
10 TABLET ORAL DAILY
Status: DISCONTINUED | OUTPATIENT
Start: 2020-01-07 | End: 2020-01-07

## 2020-01-07 RX ORDER — DIPHENHYDRAMINE HCL 50 MG
50 CAPSULE ORAL EVERY 6 HOURS
Status: DISCONTINUED | OUTPATIENT
Start: 2020-01-07 | End: 2020-01-09

## 2020-01-07 RX ORDER — FAMOTIDINE 20 MG/1
20 TABLET, FILM COATED ORAL 2 TIMES DAILY
Status: DISCONTINUED | OUTPATIENT
Start: 2020-01-07 | End: 2020-01-07

## 2020-01-07 RX ORDER — ASPIRIN 325 MG
30 TABLET ORAL ONCE
Status: COMPLETED | OUTPATIENT
Start: 2020-01-07 | End: 2020-01-07

## 2020-01-07 RX ORDER — ATENOLOL 50 MG/1
50 TABLET ORAL 2 TIMES DAILY
Status: DISCONTINUED | OUTPATIENT
Start: 2020-01-07 | End: 2020-01-09 | Stop reason: HOSPADM

## 2020-01-07 RX ORDER — IBUPROFEN 200 MG
16 TABLET ORAL
Status: DISCONTINUED | OUTPATIENT
Start: 2020-01-07 | End: 2020-01-08

## 2020-01-07 RX ORDER — ISOSORBIDE MONONITRATE 30 MG/1
30 TABLET, EXTENDED RELEASE ORAL DAILY
Status: DISCONTINUED | OUTPATIENT
Start: 2020-01-07 | End: 2020-01-09 | Stop reason: HOSPADM

## 2020-01-07 RX ORDER — GLUCAGON 1 MG
1 KIT INJECTION
Status: DISCONTINUED | OUTPATIENT
Start: 2020-01-07 | End: 2020-01-08

## 2020-01-07 RX ORDER — SODIUM CHLORIDE 9 MG/ML
INJECTION, SOLUTION INTRAVENOUS CONTINUOUS
Status: DISCONTINUED | OUTPATIENT
Start: 2020-01-07 | End: 2020-01-09 | Stop reason: HOSPADM

## 2020-01-07 RX ORDER — LANOLIN ALCOHOL/MO/W.PET/CERES
400 CREAM (GRAM) TOPICAL DAILY
Status: DISCONTINUED | OUTPATIENT
Start: 2020-01-07 | End: 2020-01-09 | Stop reason: HOSPADM

## 2020-01-07 RX ORDER — FLUOXETINE HYDROCHLORIDE 20 MG/1
20 CAPSULE ORAL DAILY
Status: DISCONTINUED | OUTPATIENT
Start: 2020-01-07 | End: 2020-01-09 | Stop reason: HOSPADM

## 2020-01-07 RX ORDER — INSULIN ASPART 100 [IU]/ML
0-5 INJECTION, SOLUTION INTRAVENOUS; SUBCUTANEOUS
Status: DISCONTINUED | OUTPATIENT
Start: 2020-01-07 | End: 2020-01-08

## 2020-01-07 RX ORDER — HYDROXYZINE HYDROCHLORIDE 10 MG/1
10 TABLET, FILM COATED ORAL 3 TIMES DAILY PRN
Status: DISCONTINUED | OUTPATIENT
Start: 2020-01-07 | End: 2020-01-09 | Stop reason: HOSPADM

## 2020-01-07 RX ORDER — DIPHENHYDRAMINE HCL 50 MG
50 CAPSULE ORAL EVERY 6 HOURS
Status: DISCONTINUED | OUTPATIENT
Start: 2020-01-07 | End: 2020-01-07

## 2020-01-07 RX ORDER — ASPIRIN 325 MG
0.3 TABLET ORAL ONCE
Status: COMPLETED | OUTPATIENT
Start: 2020-01-07 | End: 2020-01-07

## 2020-01-07 RX ORDER — AMLODIPINE BESYLATE 5 MG/1
10 TABLET ORAL NIGHTLY
Status: DISCONTINUED | OUTPATIENT
Start: 2020-01-07 | End: 2020-01-09 | Stop reason: HOSPADM

## 2020-01-07 RX ORDER — NAPROXEN SODIUM 220 MG/1
81 TABLET, FILM COATED ORAL ONCE
Status: COMPLETED | OUTPATIENT
Start: 2020-01-07 | End: 2020-01-07

## 2020-01-07 RX ORDER — PANTOPRAZOLE SODIUM 40 MG/1
40 TABLET, DELAYED RELEASE ORAL DAILY
Status: DISCONTINUED | OUTPATIENT
Start: 2020-01-07 | End: 2020-01-09 | Stop reason: HOSPADM

## 2020-01-07 RX ORDER — NITROGLYCERIN 0.4 MG/1
0.4 TABLET SUBLINGUAL EVERY 5 MIN PRN
Status: DISCONTINUED | OUTPATIENT
Start: 2020-01-07 | End: 2020-01-09 | Stop reason: HOSPADM

## 2020-01-07 RX ORDER — HYDROCHLOROTHIAZIDE 25 MG/1
25 TABLET ORAL DAILY
Status: DISCONTINUED | OUTPATIENT
Start: 2020-01-07 | End: 2020-01-09 | Stop reason: HOSPADM

## 2020-01-07 RX ORDER — ASPIRIN 325 MG
40 TABLET ORAL ONCE
Status: COMPLETED | OUTPATIENT
Start: 2020-01-07 | End: 2020-01-07

## 2020-01-07 RX ORDER — NAPROXEN SODIUM 220 MG/1
162 TABLET, FILM COATED ORAL ONCE
Status: COMPLETED | OUTPATIENT
Start: 2020-01-07 | End: 2020-01-07

## 2020-01-07 RX ADMIN — Medication 0.1 MG: at 11:01

## 2020-01-07 RX ADMIN — ISOSORBIDE MONONITRATE 30 MG: 30 TABLET, EXTENDED RELEASE ORAL at 11:01

## 2020-01-07 RX ADMIN — FAMOTIDINE 20 MG: 20 TABLET ORAL at 11:01

## 2020-01-07 RX ADMIN — TICAGRELOR 90 MG: 90 TABLET ORAL at 08:01

## 2020-01-07 RX ADMIN — ATORVASTATIN CALCIUM 80 MG: 40 TABLET, FILM COATED ORAL at 08:01

## 2020-01-07 RX ADMIN — Medication 6 MG: at 11:01

## 2020-01-07 RX ADMIN — AMLODIPINE BESYLATE 10 MG: 5 TABLET ORAL at 08:01

## 2020-01-07 RX ADMIN — SODIUM CHLORIDE: 0.9 INJECTION, SOLUTION INTRAVENOUS at 04:01

## 2020-01-07 RX ADMIN — INSULIN ASPART 2 UNITS: 100 INJECTION, SOLUTION INTRAVENOUS; SUBCUTANEOUS at 12:01

## 2020-01-07 RX ADMIN — ATENOLOL 50 MG: 50 TABLET ORAL at 08:01

## 2020-01-07 RX ADMIN — Medication 30 MG: at 12:01

## 2020-01-07 RX ADMIN — Medication 40 MG: at 12:01

## 2020-01-07 RX ADMIN — Medication 0.3 MG: at 11:01

## 2020-01-07 RX ADMIN — INSULIN ASPART 1 UNITS: 100 INJECTION, SOLUTION INTRAVENOUS; SUBCUTANEOUS at 11:01

## 2020-01-07 RX ADMIN — DIPHENHYDRAMINE HYDROCHLORIDE 50 MG: 50 CAPSULE ORAL at 11:01

## 2020-01-07 RX ADMIN — Medication 10 MG: at 12:01

## 2020-01-07 RX ADMIN — ASPIRIN 81 MG 162 MG: 81 TABLET ORAL at 01:01

## 2020-01-07 RX ADMIN — Medication 400 MG: at 11:01

## 2020-01-07 RX ADMIN — ASPIRIN 81 MG 81 MG: 81 TABLET ORAL at 01:01

## 2020-01-07 RX ADMIN — PREDNISONE 50 MG: 20 TABLET ORAL at 11:01

## 2020-01-07 NOTE — HPI
Patient is a pleasant 69-year-old lady.  Past medical history significant for coronary artery disease status post PCI of RCA.  Underwent recent cardiac catheterization by Dr. Rico which showed a 90% mid high OM1/ramus lesion as well as mid circ long 80-90% diffusely disease stenosis.  Patient was put on Imdur or for a trial of medical management, however could not tolerate the Imdur or.  States his lower cheek to Imdur or and has to take Benadryl with the Imdur or.  Continues having chest pains.  Denies orthopnea, PND, swelling feet.  Has been referred by Dr. Rico to me for further evaluation and consideration of PCI.      Patient has aspirin allergy.  Has been admitted today for aspirin desensitization.  After aspirin desensitization done plan is for coronary angiogram and cardiac catheterization tomorrow.     Cardiac catheterization November 2019:  Films were personally reviewed by me.     · Patent RCA mid stents with 50% lesion after stents  · Prox Cx to Dist Cx lesion , 95% stenosed.  · Ost 1st Mrg to 1st Mrg lesion , 90% stenosed.  · LVEDP (Pre): 16     Echo November 2019:  · Normal left ventricular systolic function. The estimated ejection fraction is 60%  · Concentric left ventricular hypertrophy.  · Grade II (moderate) left ventricular diastolic dysfunction consistent with pseudonormalization.  · Normal right ventricular systolic function.  · Moderate left atrial enlargement.  · Mild tricuspid regurgitation.  · The estimated PA systolic pressure is 50 mm Hg  · Pulmonary hypertension present.

## 2020-01-07 NOTE — PROGRESS NOTES
Aspirin desensitization therapy initiated. Patient instructed to notify me if she has any new symptoms of a reaction such as hives, swollen eyes, swollen lips, throat swelling/tightness or difficulty breathing.     11:58 No signs or symptoms of reaction at this time. Second dose administered on schedule.

## 2020-01-07 NOTE — PLAN OF CARE
Problem: Adult Inpatient Plan of Care  Goal: Plan of Care Review  Outcome: Ongoing, Progressing  Flowsheets (Taken 1/7/2020 1514)  Plan of Care Reviewed With: patient     Problem: Diabetes Comorbidity  Goal: Blood Glucose Level Within Desired Range  Outcome: Ongoing, Progressing  Intervention: Maintain Glycemic Control  Flowsheets (Taken 1/7/2020 1514)  Glycemic Management: blood glucose monitoring     PRN sliding scale insulin administered to maintain adequate glucose control.

## 2020-01-07 NOTE — ASSESSMENT & PLAN NOTE
Coronary artery disease:  Patient with severe coronary artery disease.  Lifestyle limiting symptoms despite medical management.  Unable to tolerate Imdur .  Has been referred by my partner Dr. Rico for consideration of PCI.  I had a detailed discussion with the patient regarding risks benefits of PCI versus continued medical management.  After detailed discussion we decided to proceed with PCI.     She is aspirin allergic.  We will admit her 1 day prior to PCI for aspirin desensitization.     She has iodine allergy check.  When she is admitted for aspirin desensitization will also pre treat her for iodine allergy.     Continue Brilinta.  Continue statins.     Hypertension:  well controlled on current therapy.     Risks, benefits and alternatives of the catheterization procedure were discussed with the patient.The risks of coronary angiography include but are not limited to: bleeding, infection, death, heart attack, arrhythmia, kidney injury or failure, potential need for dialysis, allergic reactions, stroke, need for emergency surgery, hematoma, pseudoaneurysm etc.  Should stenting be indicated, the patient has agreed to dual anti-platelet therapy for 1-consecutive year with a drug-eluting stent and a minimum of 1-month with the use of a bare metal stent. Additionally, pt is aware that non-compliance is likely to result in stent clotting with heart attack, heart failure, and/or death  The risks of moderate sedation include hypotension, respiratory depression, arrhythmias, bronchospasm, and death. Informed consent was obtained and the  patient is agreeable to proceed with the procedure. Consent was placed on the chart.

## 2020-01-07 NOTE — CONSULTS
[facility]  Cardiology  HPI    Patient Name: Lorena Contreras  MRN: [id number]  Admission Date: 1/7/2020  Hospital Length of Stay: 0 days  Code Status: Prior   Attending Provider: Christos Monreal MD   Consulting Provider: Christos Monreal MD  Primary Care Physician: Kenneth Luu MD  Principal Problem:<principal problem not specified>    Patient information was obtained from patient and ER records.     Consults  Subjective:     Chief Complaint:  CAD / ASPIRIN ALLERGY    HPI:      Patient is a pleasant 69-year-old lady.  Past medical history significant for coronary artery disease status post PCI of RCA.  Underwent recent cardiac catheterization by Dr. Rico which showed a 90% mid high OM1/ramus lesion as well as mid circ long 80-90% diffusely disease stenosis.  Patient was put on Imdur or for a trial of medical management, however could not tolerate the Imdur or.  States his lower cheek to Imdur or and has to take Benadryl with the Imdur or.  Continues having chest pains.  Denies orthopnea, PND, swelling feet.  Has been referred by Dr. Rico to me for further evaluation and consideration of PCI.      Patient has aspirin allergy.  Has been admitted today for aspirin desensitization.  After aspirin desensitization done plan is for coronary angiogram and cardiac catheterization tomorrow.     Cardiac catheterization November 2019:  Films were personally reviewed by me.     · Patent RCA mid stents with 50% lesion after stents  · Prox Cx to Dist Cx lesion , 95% stenosed.  · Ost 1st Mrg to 1st Mrg lesion , 90% stenosed.  · LVEDP (Pre): 16     Echo November 2019:  · Normal left ventricular systolic function. The estimated ejection fraction is 60%  · Concentric left ventricular hypertrophy.  · Grade II (moderate) left ventricular diastolic dysfunction consistent with pseudonormalization.  · Normal right ventricular systolic function.  · Moderate left atrial enlargement.  · Mild tricuspid  regurgitation.  · The estimated PA systolic pressure is 50 mm Hg  Pulmonary hypertension present.    Past Medical History:   Diagnosis Date    Anxiety     Colon polyps     Coronary artery disease     Depression     Diabetes mellitus, type II     Fibromyalgia     Follicular lymphoma     HTN (hypertension)     MI (myocardial infarction) 03/2019    Restless leg syndrome        Past Surgical History:   Procedure Laterality Date    COLONOSCOPY  11/07/2012    Colon polyp found; repeat in 5 years    ELBOW SURGERY Right     dislocation repair     ESOPHAGOGASTRODUODENOSCOPY  11/07/2012    atrophic gastritis, H pylori testing negative    KNEE SURGERY Bilateral     scoped    LEFT HEART CATHETERIZATION Left 3/29/2019    Procedure: Left heart cath;  Surgeon: Bladimir Barbosa MD;  Location: [facility] CATH LAB;  Service: Cardiology;  Laterality: Left;    LEFT HEART CATHETERIZATION Left 11/18/2019    Procedure: Left heart cath;  Surgeon: Karl Rico MD;  Location: [facility] CATH LAB;  Service: Cardiology;  Laterality: Left;       Review of patient's allergies indicates:   Allergen Reactions    Novolin 70/30 (semi-synthetic) Nausea And Vomiting     Severe vomiting on Relion 70/30    Shellfish containing products Swelling    Sulfa (sulfonamide antibiotics) Anaphylaxis    Victoza [liraglutide] Nausea And Vomiting    Glipizide Nausea Only    Asa [aspirin] Hives    Citric acid     Codeine     Egg derived     Iodine and iodide containing products     Rituxan [rituximab]     Soy     Talwin [pentazocine lactate]     Zoloft [sertraline]        No current facility-administered medications on file prior to encounter.      Current Outpatient Medications on File Prior to Encounter   Medication Sig    amLODIPine (NORVASC) 10 MG tablet TAKE ONE TABLET BY MOUTH ONCE DAILY    atenolol (TENORMIN) 50 MG tablet Take 1 tablet (50 mg total) by mouth 2 (two) times daily.    atorvastatin (LIPITOR) 80 MG tablet Take 1 tablet  "(80 mg total) by mouth every evening.    cholecalciferol, vitamin D3, (VITAMIN D3) 2,000 unit Cap Take 2 capsules by mouth 2 (two) times daily.    esomeprazole (NEXIUM) 20 MG capsule Take 20 mg by mouth before breakfast.    FLUoxetine 20 MG capsule Take 1 capsule (20 mg total) by mouth once daily.    hydroCHLOROthiazide (HYDRODIURIL) 25 MG tablet TAKE 1 TABLET BY MOUTH ONCE DAILY    isosorbide mononitrate (IMDUR) 30 MG 24 hr tablet Take 1 tablet (30 mg total) by mouth once daily.    JANUVIA 100 mg Tab TAKE 1 TABLET BY MOUTH ONCE DAILY    metFORMIN (GLUCOPHAGE) 1000 MG tablet TAKE 1 TABLET BY MOUTH TWICE DAILY WITH MEALS    ticagrelor (BRILINTA) 90 mg tablet Take 1 tablet (90 mg total) by mouth 2 (two) times daily.    blood sugar diagnostic (FREESTYLE TEST) Strp 1 strip by Misc.(Non-Drug; Combo Route) route 4 (four) times daily.    EPINEPHrine (EPIPEN) 0.3 mg/0.3 mL AtIn Inject 0.3 mLs (0.3 mg total) into the muscle once. for 1 dose    furosemide (LASIX) 20 MG tablet Take 1 tablet (20 mg total) by mouth daily as needed (leg swelling).    hydrOXYzine HCl (ATARAX) 10 MG Tab Take 1 tablet (10 mg total) by mouth 3 (three) times daily as needed.    insulin aspart protamine-insulin aspart (NOVOLOG MIX 70-30FLEXPEN U-100) 100 unit/mL (70-30) InPn pen Inject 30 Units into the skin 2 (two) times daily before meals.    lancets 32 gauge Misc 1 lancet by Misc.(Non-Drug; Combo Route) route 4 (four) times daily.    magnesium oxide (MAG-OX) 400 mg tablet Take 400 mg by mouth once daily.    meclizine (ANTIVERT) 25 mg tablet Take 1 tablet (25 mg total) by mouth 3 (three) times daily as needed.    nitroGLYCERIN (NITROSTAT) 0.4 MG SL tablet Place 1 tablet (0.4 mg total) under the tongue every 5 (five) minutes as needed for Chest pain.    pantoprazole (PROTONIX) 40 MG tablet Take 1 tablet (40 mg total) by mouth once daily.    pen needle, diabetic 32 gauge x 5/32" Ndle Use with Novolog Mix Flexpens    tolnaftate " (TINACTIN) 1 % cream Apply topically 2 (two) times daily. (Patient taking differently: Apply topically 2 (two) times daily as needed. )    triamcinolone acetonide 0.1% (KENALOG) 0.1 % cream APPLY  CREAM EXTERNALLY TO AFFECTED AREA TWICE DAILY AS NEEDED     Family History     Problem Relation (Age of Onset)    Cancer Mother, Father    Diabetes Sister    Heart disease Mother    Hypertension Sister        Tobacco Use    Smoking status: Never Smoker    Smokeless tobacco: Never Used   Substance and Sexual Activity    Alcohol use: No    Drug use: Never    Sexual activity: Not Currently     Partners: Male     Review of Systems   Constitution: Negative.   HENT: Negative.    Eyes: Negative.    Cardiovascular: Positive for chest pain.   Respiratory: Negative.    Endocrine: Negative.    Hematologic/Lymphatic: Negative.    Skin: Negative.    Musculoskeletal: Negative.    Gastrointestinal: Negative.    Genitourinary: Negative.    Neurological: Negative.    Psychiatric/Behavioral: Negative.    Allergic/Immunologic: Negative.      Objective:     Vital Signs (Most Recent):  Temp: 98.8 °F (37.1 °C) (01/07/20 1154)  Pulse: 84 (01/07/20 1154)  Resp: 18 (01/07/20 1154)  BP: (!) 152/69 (01/07/20 1154)  SpO2: 98 % (01/07/20 1154) Vital Signs (24h Range):  Temp:  [97.6 °F (36.4 °C)-98.8 °F (37.1 °C)] 98.8 °F (37.1 °C)  Pulse:  [84-89] 84  Resp:  [16-18] 18  SpO2:  [98 %] 98 %  BP: (139-152)/(62-69) 152/69     Weight: 80.4 kg (177 lb 4 oz)  Body mass index is 25.43 kg/m².    SpO2: 98 %       No intake or output data in the 24 hours ending 01/07/20 1623    Lines/Drains/Airways     Peripheral Intravenous Line                 Peripheral IV - Single Lumen 01/07/20 1058 20 G Anterior;Left Forearm less than 1 day                Physical Exam   Constitutional: She is oriented to person, place, and time. She appears well-developed and well-nourished.   HENT:   Head: Normocephalic.   Eyes: Pupils are equal, round, and reactive to light.    Neck: Normal range of motion. Neck supple.   Cardiovascular: Normal rate and regular rhythm.   Pulmonary/Chest: Effort normal and breath sounds normal.   Abdominal: Soft. Normal appearance and bowel sounds are normal. There is no tenderness.   Musculoskeletal: Normal range of motion.   Neurological: She is alert and oriented to person, place, and time.   Skin: Skin is warm.   Psychiatric: She has a normal mood and affect.       Significant Labs:   BMP:   Recent Labs   Lab 01/06/20  1625   *      K 4.9      CO2 25   BUN 23   CREATININE 1.4   CALCIUM 9.5   , CMP   Recent Labs   Lab 01/06/20  1625      K 4.9      CO2 25   *   BUN 23   CREATININE 1.4   CALCIUM 9.5   ANIONGAP 9   ESTGFRAFRICA 44*   EGFRNONAA 38*   , CBC   Recent Labs   Lab 01/06/20  1625   WBC 11.83   HGB 10.7*   HCT 33.6*      , INR No results for input(s): INR, PROTIME in the last 48 hours., Lipid Panel No results for input(s): CHOL, HDL, LDLCALC, TRIG, CHOLHDL in the last 48 hours., Troponin No results for input(s): TROPONINI in the last 48 hours. and All pertinent lab results from the last 24 hours have been reviewed.    Significant Imaging: Echocardiogram:   Transthoracic echo (TTE) complete (Cupid Only):   Results for orders placed or performed during the hospital encounter of 11/17/19   Echo Color Flow Doppler? Yes   Result Value Ref Range    TDI SEPTAL 0.06 m/s    LV LATERAL E/E' RATIO 24.17 m/s    LV SEPTAL E/E' RATIO 24.17 m/s    LA WIDTH 4.56 cm    AORTIC VALVE CUSP SEPERATION 1.48 cm    TDI LATERAL 0.06 m/s    PV PEAK VELOCITY 1.17 cm/s    LVIDD 3.74 3.5 - 6.0 cm    IVS 1.21 (A) 0.6 - 1.1 cm    PW 1.34 (A) 0.6 - 1.1 cm    Ao root annulus 2.77 cm    LVIDS 2.63 2.1 - 4.0 cm    FS 30 28 - 44 %    LA volume 86.89 cm3    Sinus 2.71 cm    STJ 2.06 cm    Ascending aorta 2.43 cm    LV mass 164.13 g    LA size 3.79 cm    RVDD 4.28 cm    TAPSE 2.23 cm    RV S' 12.48 cm/s    Left Ventricle Relative Wall  Thickness 0.72 cm    AV mean gradient 10 mmHg    AV valve area 1.85 cm2    AV Velocity Ratio 0.62     AV index (prosthetic) 0.61     E/A ratio 0.95     Mean e' 0.06 m/s    E wave decelartion time 209.18 msec    IVRT 0.09 msec    Pulm vein S/D ratio 1.50     LVOT diameter 1.97 cm    LVOT area 3.0 cm2    LVOT peak mike 1.25 m/s    LVOT peak VTI 29.53 cm    Ao peak mike 2.02 m/s    Ao VTI 48.52 cm    LVOT stroke volume 89.96 cm3    AV peak gradient 16 mmHg    E/E' ratio 24.17 m/s    MV Peak E Mike 1.45 m/s    TR Max Mike 3.43 m/s    MV Peak A Mike 1.53 m/s    PV Peak S Mike 0.72 m/s    PV Peak D Mike 0.48 m/s    LV Systolic Volume 25.22 mL    LV Systolic Volume Index 13.7 mL/m2    LV Diastolic Volume 59.72 mL    LV Diastolic Volume Index 32.33 mL/m2    LA Volume Index 47.0 mL/m2    LV Mass Index 89 g/m2    RA Major Axis 5.95 cm    Left Atrium Minor Axis 5.52 cm    Left Atrium Major Axis 6.37 cm    Triscuspid Valve Regurgitation Peak Gradient 47 mmHg    RA Width 4.38 cm    BSA 1.83 m2    Right Atrial Pressure (from IVC) 3 mmHg    TV rest pulmonary artery pressure 50 mmHg    Narrative    · Normal left ventricular systolic function. The estimated ejection   fraction is 60%  · Concentric left ventricular hypertrophy.  · Grade II (moderate) left ventricular diastolic dysfunction consistent   with pseudonormalization.  · Normal right ventricular systolic function.  · Moderate left atrial enlargement.  · Mild tricuspid regurgitation.  · The estimated PA systolic pressure is 50 mm Hg  · Pulmonary hypertension present.        Assessment and Plan:     Coronary artery disease involving native coronary artery of native heart  Coronary artery disease:  Patient with severe coronary artery disease.  Lifestyle limiting symptoms despite medical management.  Unable to tolerate Imdur .  Has been referred by my partner Dr. Rico for consideration of PCI.  I had a detailed discussion with the patient regarding risks benefits of PCI versus  continued medical management.  After detailed discussion we decided to proceed with PCI.     She is aspirin allergic.  We will admit her 1 day prior to PCI for aspirin desensitization.     She has iodine allergy check.  When she is admitted for aspirin desensitization will also pre treat her for iodine allergy.     Continue Brilinta.  Continue statins.     Hypertension:  well controlled on current therapy.     Risks, benefits and alternatives of the catheterization procedure were discussed with the patient.The risks of coronary angiography include but are not limited to: bleeding, infection, death, heart attack, arrhythmia, kidney injury or failure, potential need for dialysis, allergic reactions, stroke, need for emergency surgery, hematoma, pseudoaneurysm etc.  Should stenting be indicated, the patient has agreed to dual anti-platelet therapy for 1-consecutive year with a drug-eluting stent and a minimum of 1-month with the use of a bare metal stent. Additionally, pt is aware that non-compliance is likely to result in stent clotting with heart attack, heart failure, and/or death  The risks of moderate sedation include hypotension, respiratory depression, arrhythmias, bronchospasm, and death. Informed consent was obtained and the  patient is agreeable to proceed with the procedure. Consent was placed on the chart.        VTE Risk Mitigation (From admission, onward)    None          Thank you for your consult. I will follow-up with patient. Please contact us if you have any additional questions.    Christos Monreal MD  Cardiology   [facility]

## 2020-01-07 NOTE — Clinical Note
154 ml injected throughout the case. 46 mL total wasted during the case. 200 mL total used in the case.

## 2020-01-07 NOTE — Clinical Note
Catheter is inserted into the ostium   right coronary artery. Angiography performed  in multiple views.

## 2020-01-07 NOTE — SUBJECTIVE & OBJECTIVE
Past Medical History:   Diagnosis Date    Anxiety     Colon polyps     Coronary artery disease     Depression     Diabetes mellitus, type II     Fibromyalgia     Follicular lymphoma     HTN (hypertension)     MI (myocardial infarction) 03/2019    Restless leg syndrome        Past Surgical History:   Procedure Laterality Date    COLONOSCOPY  11/07/2012    Colon polyp found; repeat in 5 years    ELBOW SURGERY Right     dislocation repair     ESOPHAGOGASTRODUODENOSCOPY  11/07/2012    atrophic gastritis, H pylori testing negative    KNEE SURGERY Bilateral     scoped    LEFT HEART CATHETERIZATION Left 3/29/2019    Procedure: Left heart cath;  Surgeon: Bladimir Barbosa MD;  Location: [facility] CATH LAB;  Service: Cardiology;  Laterality: Left;    LEFT HEART CATHETERIZATION Left 11/18/2019    Procedure: Left heart cath;  Surgeon: Karl Rico MD;  Location: [facility] CATH LAB;  Service: Cardiology;  Laterality: Left;       Review of patient's allergies indicates:   Allergen Reactions    Novolin 70/30 (semi-synthetic) Nausea And Vomiting     Severe vomiting on Relion 70/30    Shellfish containing products Swelling    Sulfa (sulfonamide antibiotics) Anaphylaxis    Victoza [liraglutide] Nausea And Vomiting    Glipizide Nausea Only    Asa [aspirin] Hives    Citric acid     Codeine     Egg derived     Iodine and iodide containing products     Rituxan [rituximab]     Soy     Talwin [pentazocine lactate]     Zoloft [sertraline]        No current facility-administered medications on file prior to encounter.      Current Outpatient Medications on File Prior to Encounter   Medication Sig    amLODIPine (NORVASC) 10 MG tablet TAKE ONE TABLET BY MOUTH ONCE DAILY    atenolol (TENORMIN) 50 MG tablet Take 1 tablet (50 mg total) by mouth 2 (two) times daily.    atorvastatin (LIPITOR) 80 MG tablet Take 1 tablet (80 mg total) by mouth every evening.    cholecalciferol, vitamin D3, (VITAMIN D3) 2,000 unit Cap Take  "2 capsules by mouth 2 (two) times daily.    esomeprazole (NEXIUM) 20 MG capsule Take 20 mg by mouth before breakfast.    FLUoxetine 20 MG capsule Take 1 capsule (20 mg total) by mouth once daily.    hydroCHLOROthiazide (HYDRODIURIL) 25 MG tablet TAKE 1 TABLET BY MOUTH ONCE DAILY    isosorbide mononitrate (IMDUR) 30 MG 24 hr tablet Take 1 tablet (30 mg total) by mouth once daily.    JANUVIA 100 mg Tab TAKE 1 TABLET BY MOUTH ONCE DAILY    metFORMIN (GLUCOPHAGE) 1000 MG tablet TAKE 1 TABLET BY MOUTH TWICE DAILY WITH MEALS    ticagrelor (BRILINTA) 90 mg tablet Take 1 tablet (90 mg total) by mouth 2 (two) times daily.    blood sugar diagnostic (FREESTYLE TEST) Strp 1 strip by Misc.(Non-Drug; Combo Route) route 4 (four) times daily.    EPINEPHrine (EPIPEN) 0.3 mg/0.3 mL AtIn Inject 0.3 mLs (0.3 mg total) into the muscle once. for 1 dose    furosemide (LASIX) 20 MG tablet Take 1 tablet (20 mg total) by mouth daily as needed (leg swelling).    hydrOXYzine HCl (ATARAX) 10 MG Tab Take 1 tablet (10 mg total) by mouth 3 (three) times daily as needed.    insulin aspart protamine-insulin aspart (NOVOLOG MIX 70-30FLEXPEN U-100) 100 unit/mL (70-30) InPn pen Inject 30 Units into the skin 2 (two) times daily before meals.    lancets 32 gauge Misc 1 lancet by Misc.(Non-Drug; Combo Route) route 4 (four) times daily.    magnesium oxide (MAG-OX) 400 mg tablet Take 400 mg by mouth once daily.    meclizine (ANTIVERT) 25 mg tablet Take 1 tablet (25 mg total) by mouth 3 (three) times daily as needed.    nitroGLYCERIN (NITROSTAT) 0.4 MG SL tablet Place 1 tablet (0.4 mg total) under the tongue every 5 (five) minutes as needed for Chest pain.    pantoprazole (PROTONIX) 40 MG tablet Take 1 tablet (40 mg total) by mouth once daily.    pen needle, diabetic 32 gauge x 5/32" Ndle Use with Novolog Mix Flexpens    tolnaftate (TINACTIN) 1 % cream Apply topically 2 (two) times daily. (Patient taking differently: Apply topically 2 " (two) times daily as needed. )    triamcinolone acetonide 0.1% (KENALOG) 0.1 % cream APPLY  CREAM EXTERNALLY TO AFFECTED AREA TWICE DAILY AS NEEDED     Family History     Problem Relation (Age of Onset)    Cancer Mother, Father    Diabetes Sister    Heart disease Mother    Hypertension Sister        Tobacco Use    Smoking status: Never Smoker    Smokeless tobacco: Never Used   Substance and Sexual Activity    Alcohol use: No    Drug use: Never    Sexual activity: Not Currently     Partners: Male     Review of Systems   Constitution: Negative.   HENT: Negative.    Eyes: Negative.    Cardiovascular: Positive for chest pain.   Respiratory: Negative.    Endocrine: Negative.    Hematologic/Lymphatic: Negative.    Skin: Negative.    Musculoskeletal: Negative.    Gastrointestinal: Negative.    Genitourinary: Negative.    Neurological: Negative.    Psychiatric/Behavioral: Negative.    Allergic/Immunologic: Negative.      Objective:     Vital Signs (Most Recent):  Temp: 98.8 °F (37.1 °C) (01/07/20 1154)  Pulse: 84 (01/07/20 1154)  Resp: 18 (01/07/20 1154)  BP: (!) 152/69 (01/07/20 1154)  SpO2: 98 % (01/07/20 1154) Vital Signs (24h Range):  Temp:  [97.6 °F (36.4 °C)-98.8 °F (37.1 °C)] 98.8 °F (37.1 °C)  Pulse:  [84-89] 84  Resp:  [16-18] 18  SpO2:  [98 %] 98 %  BP: (139-152)/(62-69) 152/69     Weight: 80.4 kg (177 lb 4 oz)  Body mass index is 25.43 kg/m².    SpO2: 98 %       No intake or output data in the 24 hours ending 01/07/20 1623    Lines/Drains/Airways     Peripheral Intravenous Line                 Peripheral IV - Single Lumen 01/07/20 1058 20 G Anterior;Left Forearm less than 1 day                Physical Exam   Constitutional: She is oriented to person, place, and time. She appears well-developed and well-nourished.   HENT:   Head: Normocephalic.   Eyes: Pupils are equal, round, and reactive to light.   Neck: Normal range of motion. Neck supple.   Cardiovascular: Normal rate and regular rhythm.    Pulmonary/Chest: Effort normal and breath sounds normal.   Abdominal: Soft. Normal appearance and bowel sounds are normal. There is no tenderness.   Musculoskeletal: Normal range of motion.   Neurological: She is alert and oriented to person, place, and time.   Skin: Skin is warm.   Psychiatric: She has a normal mood and affect.       Significant Labs:   BMP:   Recent Labs   Lab 01/06/20  1625   *      K 4.9      CO2 25   BUN 23   CREATININE 1.4   CALCIUM 9.5   , CMP   Recent Labs   Lab 01/06/20  1625      K 4.9      CO2 25   *   BUN 23   CREATININE 1.4   CALCIUM 9.5   ANIONGAP 9   ESTGFRAFRICA 44*   EGFRNONAA 38*   , CBC   Recent Labs   Lab 01/06/20  1625   WBC 11.83   HGB 10.7*   HCT 33.6*      , INR No results for input(s): INR, PROTIME in the last 48 hours., Lipid Panel No results for input(s): CHOL, HDL, LDLCALC, TRIG, CHOLHDL in the last 48 hours., Troponin No results for input(s): TROPONINI in the last 48 hours. and All pertinent lab results from the last 24 hours have been reviewed.    Significant Imaging: Echocardiogram:   Transthoracic echo (TTE) complete (Cupid Only):   Results for orders placed or performed during the hospital encounter of 11/17/19   Echo Color Flow Doppler? Yes   Result Value Ref Range    TDI SEPTAL 0.06 m/s    LV LATERAL E/E' RATIO 24.17 m/s    LV SEPTAL E/E' RATIO 24.17 m/s    LA WIDTH 4.56 cm    AORTIC VALVE CUSP SEPERATION 1.48 cm    TDI LATERAL 0.06 m/s    PV PEAK VELOCITY 1.17 cm/s    LVIDD 3.74 3.5 - 6.0 cm    IVS 1.21 (A) 0.6 - 1.1 cm    PW 1.34 (A) 0.6 - 1.1 cm    Ao root annulus 2.77 cm    LVIDS 2.63 2.1 - 4.0 cm    FS 30 28 - 44 %    LA volume 86.89 cm3    Sinus 2.71 cm    STJ 2.06 cm    Ascending aorta 2.43 cm    LV mass 164.13 g    LA size 3.79 cm    RVDD 4.28 cm    TAPSE 2.23 cm    RV S' 12.48 cm/s    Left Ventricle Relative Wall Thickness 0.72 cm    AV mean gradient 10 mmHg    AV valve area 1.85 cm2    AV Velocity Ratio 0.62      AV index (prosthetic) 0.61     E/A ratio 0.95     Mean e' 0.06 m/s    E wave decelartion time 209.18 msec    IVRT 0.09 msec    Pulm vein S/D ratio 1.50     LVOT diameter 1.97 cm    LVOT area 3.0 cm2    LVOT peak mike 1.25 m/s    LVOT peak VTI 29.53 cm    Ao peak mike 2.02 m/s    Ao VTI 48.52 cm    LVOT stroke volume 89.96 cm3    AV peak gradient 16 mmHg    E/E' ratio 24.17 m/s    MV Peak E Mike 1.45 m/s    TR Max Mike 3.43 m/s    MV Peak A Mike 1.53 m/s    PV Peak S Mike 0.72 m/s    PV Peak D Mike 0.48 m/s    LV Systolic Volume 25.22 mL    LV Systolic Volume Index 13.7 mL/m2    LV Diastolic Volume 59.72 mL    LV Diastolic Volume Index 32.33 mL/m2    LA Volume Index 47.0 mL/m2    LV Mass Index 89 g/m2    RA Major Axis 5.95 cm    Left Atrium Minor Axis 5.52 cm    Left Atrium Major Axis 6.37 cm    Triscuspid Valve Regurgitation Peak Gradient 47 mmHg    RA Width 4.38 cm    BSA 1.83 m2    Right Atrial Pressure (from IVC) 3 mmHg    TV rest pulmonary artery pressure 50 mmHg    Narrative    · Normal left ventricular systolic function. The estimated ejection   fraction is 60%  · Concentric left ventricular hypertrophy.  · Grade II (moderate) left ventricular diastolic dysfunction consistent   with pseudonormalization.  · Normal right ventricular systolic function.  · Moderate left atrial enlargement.  · Mild tricuspid regurgitation.  · The estimated PA systolic pressure is 50 mm Hg  · Pulmonary hypertension present.

## 2020-01-07 NOTE — PROGRESS NOTES
Aspirin desensitization therapy completed. No current signs or symptoms of allergic reaction at this time. Patient currently lying in bed watching television without any complaints at this time.

## 2020-01-07 NOTE — PROGRESS NOTES
Received patient from home (direct admit) to room via wheelchair. Patient accompanied by transport. Transferred patient to bed. Evaluated general patient appearance and condition. Admit assessment initiated. Tele monitoring initiated. No apparent distress noted at this time.

## 2020-01-08 ENCOUNTER — TELEPHONE (OUTPATIENT)
Dept: INTERNAL MEDICINE | Facility: CLINIC | Age: 70
End: 2020-01-08

## 2020-01-08 LAB
ALLENS TEST: ABNORMAL
ALLENS TEST: ABNORMAL
POC ACTIVATED CLOTTING TIME K: 274 SEC (ref 74–137)
POC ACTIVATED CLOTTING TIME K: 301 SEC (ref 74–137)
POCT GLUCOSE: 372 MG/DL (ref 70–110)
POCT GLUCOSE: 398 MG/DL (ref 70–110)
POCT GLUCOSE: 398 MG/DL (ref 70–110)
POCT GLUCOSE: 448 MG/DL (ref 70–110)
SAMPLE: ABNORMAL
SAMPLE: ABNORMAL
SITE: ABNORMAL
SITE: ABNORMAL

## 2020-01-08 PROCEDURE — 92979 ENDOLUMINL IVUS OCT C EA: CPT | Mod: 26,LC,HCNC, | Performed by: INTERNAL MEDICINE

## 2020-01-08 PROCEDURE — 99152 MOD SED SAME PHYS/QHP 5/>YRS: CPT | Mod: HCNC | Performed by: INTERNAL MEDICINE

## 2020-01-08 PROCEDURE — 25000003 PHARM REV CODE 250: Mod: HCNC | Performed by: INTERNAL MEDICINE

## 2020-01-08 PROCEDURE — 27201423 OPTIME MED/SURG SUP & DEVICES STERILE SUPPLY: Mod: HCNC | Performed by: INTERNAL MEDICINE

## 2020-01-08 PROCEDURE — 63600175 PHARM REV CODE 636 W HCPCS: Mod: HCNC | Performed by: INTERNAL MEDICINE

## 2020-01-08 PROCEDURE — 92928 PR STENT: ICD-10-PCS | Mod: RI,HCNC,, | Performed by: INTERNAL MEDICINE

## 2020-01-08 PROCEDURE — G0378 HOSPITAL OBSERVATION PER HR: HCPCS | Mod: HCNC

## 2020-01-08 PROCEDURE — 92979 ENDOLUMINL IVUS OCT C EA: CPT | Mod: LC,HCNC | Performed by: INTERNAL MEDICINE

## 2020-01-08 PROCEDURE — C1894 INTRO/SHEATH, NON-LASER: HCPCS | Mod: HCNC | Performed by: INTERNAL MEDICINE

## 2020-01-08 PROCEDURE — C1769 GUIDE WIRE: HCPCS | Mod: HCNC | Performed by: INTERNAL MEDICINE

## 2020-01-08 PROCEDURE — 92928 PRQ TCAT PLMT NTRAC ST 1 LES: CPT | Mod: RI,HCNC,, | Performed by: INTERNAL MEDICINE

## 2020-01-08 PROCEDURE — 99152 MOD SED SAME PHYS/QHP 5/>YRS: CPT | Mod: HCNC,,, | Performed by: INTERNAL MEDICINE

## 2020-01-08 PROCEDURE — C1887 CATHETER, GUIDING: HCPCS | Mod: HCNC | Performed by: INTERNAL MEDICINE

## 2020-01-08 PROCEDURE — C1874 STENT, COATED/COV W/DEL SYS: HCPCS | Mod: HCNC | Performed by: INTERNAL MEDICINE

## 2020-01-08 PROCEDURE — C9600 PERC DRUG-EL COR STENT SING: HCPCS | Mod: RI,HCNC | Performed by: INTERNAL MEDICINE

## 2020-01-08 PROCEDURE — 92979 PR INTRAVASC CORONARY SO2,ADDN VESSEL: ICD-10-PCS | Mod: 26,LC,HCNC, | Performed by: INTERNAL MEDICINE

## 2020-01-08 PROCEDURE — 92978 ENDOLUMINL IVUS OCT C 1ST: CPT | Mod: 26,RI,HCNC, | Performed by: INTERNAL MEDICINE

## 2020-01-08 PROCEDURE — C1753 CATH, INTRAVAS ULTRASOUND: HCPCS | Mod: HCNC | Performed by: INTERNAL MEDICINE

## 2020-01-08 PROCEDURE — 99153 MOD SED SAME PHYS/QHP EA: CPT | Mod: HCNC | Performed by: INTERNAL MEDICINE

## 2020-01-08 PROCEDURE — 92978 PR IVUS, CORONARY, 1ST VESSEL: ICD-10-PCS | Mod: 26,RI,HCNC, | Performed by: INTERNAL MEDICINE

## 2020-01-08 PROCEDURE — 93454 CORONARY ARTERY ANGIO S&I: CPT | Mod: 26,51,59,HCNC | Performed by: INTERNAL MEDICINE

## 2020-01-08 PROCEDURE — 93454 CORONARY ARTERY ANGIO S&I: CPT | Mod: 59,HCNC | Performed by: INTERNAL MEDICINE

## 2020-01-08 PROCEDURE — 25500020 PHARM REV CODE 255: Mod: HCNC | Performed by: INTERNAL MEDICINE

## 2020-01-08 PROCEDURE — 92978 ENDOLUMINL IVUS OCT C 1ST: CPT | Mod: RI,HCNC | Performed by: INTERNAL MEDICINE

## 2020-01-08 PROCEDURE — 99152 PR MOD CONSCIOUS SEDATION, SAME PHYS, 5+ YRS, FIRST 15 MIN: ICD-10-PCS | Mod: HCNC,,, | Performed by: INTERNAL MEDICINE

## 2020-01-08 PROCEDURE — C1725 CATH, TRANSLUMIN NON-LASER: HCPCS | Mod: HCNC | Performed by: INTERNAL MEDICINE

## 2020-01-08 PROCEDURE — 93454 PR CATH PLACE/CORONARY ANGIO, IMG SUPER/INTERP: ICD-10-PCS | Mod: 26,51,59,HCNC | Performed by: INTERNAL MEDICINE

## 2020-01-08 DEVICE — STENT RONYX20008UX RESOLUTE ONYX 2.00X08
Type: IMPLANTABLE DEVICE | Site: HEART | Status: FUNCTIONAL
Brand: RESOLUTE ONYX™

## 2020-01-08 DEVICE — STENT RONYX22522UX RESOLUTE ONYX 2.25X22
Type: IMPLANTABLE DEVICE | Site: HEART | Status: FUNCTIONAL
Brand: RESOLUTE ONYX™

## 2020-01-08 RX ORDER — LIDOCAINE HYDROCHLORIDE 10 MG/ML
INJECTION, SOLUTION EPIDURAL; INFILTRATION; INTRACAUDAL; PERINEURAL
Status: DISCONTINUED | OUTPATIENT
Start: 2020-01-08 | End: 2020-01-08 | Stop reason: HOSPADM

## 2020-01-08 RX ORDER — FENTANYL CITRATE 50 UG/ML
INJECTION, SOLUTION INTRAMUSCULAR; INTRAVENOUS
Status: DISCONTINUED | OUTPATIENT
Start: 2020-01-08 | End: 2020-01-08 | Stop reason: HOSPADM

## 2020-01-08 RX ORDER — NITROGLYCERIN 20 MG/100ML
INJECTION INTRAVENOUS
Status: DISCONTINUED | OUTPATIENT
Start: 2020-01-08 | End: 2020-01-08 | Stop reason: HOSPADM

## 2020-01-08 RX ORDER — VERAPAMIL HYDROCHLORIDE 2.5 MG/ML
INJECTION, SOLUTION INTRAVENOUS
Status: DISCONTINUED | OUTPATIENT
Start: 2020-01-08 | End: 2020-01-08 | Stop reason: HOSPADM

## 2020-01-08 RX ORDER — GLUCAGON 1 MG
1 KIT INJECTION
Status: DISCONTINUED | OUTPATIENT
Start: 2020-01-08 | End: 2020-01-09 | Stop reason: HOSPADM

## 2020-01-08 RX ORDER — ONDANSETRON 2 MG/ML
4 INJECTION INTRAMUSCULAR; INTRAVENOUS EVERY 12 HOURS PRN
Status: DISCONTINUED | OUTPATIENT
Start: 2020-01-08 | End: 2020-01-09 | Stop reason: HOSPADM

## 2020-01-08 RX ORDER — INSULIN ASPART 100 [IU]/ML
1-10 INJECTION, SOLUTION INTRAVENOUS; SUBCUTANEOUS
Status: DISCONTINUED | OUTPATIENT
Start: 2020-01-08 | End: 2020-01-09 | Stop reason: HOSPADM

## 2020-01-08 RX ORDER — ENOXAPARIN SODIUM 100 MG/ML
40 INJECTION SUBCUTANEOUS EVERY 24 HOURS
Status: DISCONTINUED | OUTPATIENT
Start: 2020-01-08 | End: 2020-01-09 | Stop reason: HOSPADM

## 2020-01-08 RX ORDER — IBUPROFEN 200 MG
16 TABLET ORAL
Status: DISCONTINUED | OUTPATIENT
Start: 2020-01-08 | End: 2020-01-09 | Stop reason: HOSPADM

## 2020-01-08 RX ORDER — HEPARIN SODIUM 1000 [USP'U]/ML
INJECTION, SOLUTION INTRAVENOUS; SUBCUTANEOUS
Status: DISCONTINUED | OUTPATIENT
Start: 2020-01-08 | End: 2020-01-08 | Stop reason: HOSPADM

## 2020-01-08 RX ORDER — INSULIN ASPART 100 [IU]/ML
4 INJECTION, SOLUTION INTRAVENOUS; SUBCUTANEOUS ONCE
Status: COMPLETED | OUTPATIENT
Start: 2020-01-08 | End: 2020-01-08

## 2020-01-08 RX ORDER — SODIUM CHLORIDE 9 MG/ML
INJECTION, SOLUTION INTRAVENOUS CONTINUOUS
Status: DISCONTINUED | OUTPATIENT
Start: 2020-01-08 | End: 2020-01-09 | Stop reason: HOSPADM

## 2020-01-08 RX ORDER — MORPHINE SULFATE 10 MG/ML
3 INJECTION INTRAMUSCULAR; INTRAVENOUS; SUBCUTANEOUS
Status: DISCONTINUED | OUTPATIENT
Start: 2020-01-08 | End: 2020-01-09 | Stop reason: HOSPADM

## 2020-01-08 RX ORDER — OXYCODONE HYDROCHLORIDE 5 MG/1
5 TABLET ORAL EVERY 4 HOURS PRN
Status: DISCONTINUED | OUTPATIENT
Start: 2020-01-08 | End: 2020-01-09 | Stop reason: HOSPADM

## 2020-01-08 RX ORDER — MIDAZOLAM HYDROCHLORIDE 1 MG/ML
INJECTION, SOLUTION INTRAMUSCULAR; INTRAVENOUS
Status: DISCONTINUED | OUTPATIENT
Start: 2020-01-08 | End: 2020-01-08 | Stop reason: HOSPADM

## 2020-01-08 RX ORDER — ACETAMINOPHEN 325 MG/1
650 TABLET ORAL EVERY 4 HOURS PRN
Status: DISCONTINUED | OUTPATIENT
Start: 2020-01-08 | End: 2020-01-09 | Stop reason: HOSPADM

## 2020-01-08 RX ORDER — IBUPROFEN 200 MG
24 TABLET ORAL
Status: DISCONTINUED | OUTPATIENT
Start: 2020-01-08 | End: 2020-01-09 | Stop reason: HOSPADM

## 2020-01-08 RX ADMIN — SODIUM CHLORIDE: 0.9 INJECTION, SOLUTION INTRAVENOUS at 05:01

## 2020-01-08 RX ADMIN — ACETAMINOPHEN 650 MG: 325 TABLET ORAL at 07:01

## 2020-01-08 RX ADMIN — DIPHENHYDRAMINE HYDROCHLORIDE 50 MG: 50 CAPSULE ORAL at 11:01

## 2020-01-08 RX ADMIN — AMLODIPINE BESYLATE 10 MG: 5 TABLET ORAL at 08:01

## 2020-01-08 RX ADMIN — DIPHENHYDRAMINE HYDROCHLORIDE 50 MG: 50 CAPSULE ORAL at 06:01

## 2020-01-08 RX ADMIN — SODIUM CHLORIDE: 0.9 INJECTION, SOLUTION INTRAVENOUS at 06:01

## 2020-01-08 RX ADMIN — PREDNISONE 50 MG: 20 TABLET ORAL at 11:01

## 2020-01-08 RX ADMIN — DIPHENHYDRAMINE HYDROCHLORIDE 50 MG: 50 CAPSULE ORAL at 03:01

## 2020-01-08 RX ADMIN — TICAGRELOR 90 MG: 90 TABLET ORAL at 08:01

## 2020-01-08 RX ADMIN — INSULIN ASPART 5 UNITS: 100 INJECTION, SOLUTION INTRAVENOUS; SUBCUTANEOUS at 11:01

## 2020-01-08 RX ADMIN — ATORVASTATIN CALCIUM 80 MG: 40 TABLET, FILM COATED ORAL at 08:01

## 2020-01-08 RX ADMIN — FAMOTIDINE 20 MG: 20 TABLET ORAL at 03:01

## 2020-01-08 RX ADMIN — Medication 400 MG: at 08:01

## 2020-01-08 RX ADMIN — ACETAMINOPHEN 650 MG: 325 TABLET ORAL at 11:01

## 2020-01-08 RX ADMIN — ISOSORBIDE MONONITRATE 30 MG: 30 TABLET, EXTENDED RELEASE ORAL at 08:01

## 2020-01-08 RX ADMIN — HYDROCHLOROTHIAZIDE 25 MG: 25 TABLET ORAL at 08:01

## 2020-01-08 RX ADMIN — PREDNISONE 50 MG: 20 TABLET ORAL at 03:01

## 2020-01-08 RX ADMIN — DIPHENHYDRAMINE HYDROCHLORIDE 50 MG: 50 CAPSULE ORAL at 05:01

## 2020-01-08 RX ADMIN — FLUOXETINE 20 MG: 20 CAPSULE ORAL at 08:01

## 2020-01-08 RX ADMIN — PREDNISONE 50 MG: 20 TABLET ORAL at 06:01

## 2020-01-08 RX ADMIN — INSULIN ASPART 3 UNITS: 100 INJECTION, SOLUTION INTRAVENOUS; SUBCUTANEOUS at 09:01

## 2020-01-08 RX ADMIN — ENOXAPARIN SODIUM 40 MG: 100 INJECTION SUBCUTANEOUS at 06:01

## 2020-01-08 RX ADMIN — ATENOLOL 50 MG: 50 TABLET ORAL at 08:01

## 2020-01-08 RX ADMIN — HYDROXYZINE HYDROCHLORIDE 10 MG: 10 TABLET, FILM COATED ORAL at 07:01

## 2020-01-08 RX ADMIN — FAMOTIDINE 20 MG: 20 TABLET ORAL at 05:01

## 2020-01-08 RX ADMIN — Medication 6 MG: at 11:01

## 2020-01-08 RX ADMIN — INSULIN ASPART 5 UNITS: 100 INJECTION, SOLUTION INTRAVENOUS; SUBCUTANEOUS at 08:01

## 2020-01-08 RX ADMIN — PREDNISONE 50 MG: 20 TABLET ORAL at 05:01

## 2020-01-08 RX ADMIN — INSULIN ASPART 4 UNITS: 100 INJECTION, SOLUTION INTRAVENOUS; SUBCUTANEOUS at 12:01

## 2020-01-08 RX ADMIN — INSULIN ASPART 3 UNITS: 100 INJECTION, SOLUTION INTRAVENOUS; SUBCUTANEOUS at 12:01

## 2020-01-08 RX ADMIN — PANTOPRAZOLE SODIUM 40 MG: 40 TABLET, DELAYED RELEASE ORAL at 08:01

## 2020-01-08 NOTE — TELEPHONE ENCOUNTER
----- Message from Danuta Raphael sent at 1/8/2020  4:33 PM CST -----  Contact: Albertina-- Case Manger  Type:  Needs Medical Advice    Who Called:  Albertina    Symptoms (please be specific): hospital f/u    Would the patient rather a call back or a response via [facility]? Call    Best Call Back Number:  [phone]    Additional Information:  Albertina called to schedule pt an appt for about a week from today. She schedule pt an appt for 2/6, but requests nurse to call pt to schedule sooner appt.

## 2020-01-08 NOTE — PROGRESS NOTES
[facility] WRITTEN HEALTHCARE AND DISCHARGE INFORMATION.  Follow-up Information     Kenneth Luu MD On 2/6/2020.    Specialty:  Internal Medicine  Why:  out patient services: 2:30pm follow up from the hospital. Requested sooner date.  Contact information:  [address]  [place] [address]  [phone]             Christos Monreal MD On 1/16/2020.    Specialties:  Cardiology, INTERVENTIONAL CARDIOLOGY  Why:  out patient services: 1:20pm post op  Contact information:  [address]  [address]  [place] [address]  [phone]                   Help at Home           [phone]  After discharge for assistance [facility] Line 24/7 Assistance.   Things You are responsible For To Manage Your Care At Home:  1.    Getting your prescriptions filled   2.    Taking your medications as directed, DO NOT MISS ANY DOSES!  3.    Going to your follow-up doctor appointment. This is important because it  allow the doctor to monitor your progress and determine if  any changes need to made to your treatment plan.   Thank you for choosing [facility] for your care. [facility] happy [facility] have the opportunity to serve you.    Sincerely,  Your [facility],  Albertina Alvarez RN, BSN, [facility]  [phone]  1/8/2020

## 2020-01-08 NOTE — PROGRESS NOTES
Bedside handoff given to oncoming Nurse Kimberly. Patient lying in bed without any acute distress noted at this time. Safety precautions maintained.

## 2020-01-08 NOTE — PLAN OF CARE
"TN introduced herself and explained the 's role. TN utilized 2 patient identifiers: Name & .  TN placed Name and spectralink number on whiteboard.TN provided and reviewed with patient "Blue My Health Packet". TN discussed what Help At Home means to the patient and the name of the person Angel, who helps at home. TN discussed with patient the things the patient is responsible for to HELP manage patient's  healthcare at home. TN taught Symptoms and Problems with patient for CHEST PAIN .Patient verbalized understanding & teachback:1. PRESSURE IN CHEST, 2.SOB . Patient prefers morning or after noon doctor appointments.     20 1326   Discharge Assessment   Assessment Type Discharge Planning Assessment   Confirmed/corrected address and phone number on facesheet? Yes   Assessment information obtained from? Patient;Caregiver   Communicated expected length of stay with patient/caregiver yes   Prior to hospitilization cognitive status: Alert/Oriented   Prior to hospitalization functional status: Independent   Current cognitive status: Alert/Oriented   Current Functional Status: Independent   Lives With spouse;grandchild(daniel)   Able to Return to Prior Arrangements yes   Is patient able to care for self after discharge? Yes   Who are your caregiver(s) and their phone number(s)?   (Angel Contreras [phone], spouse)   Patient's perception of discharge disposition home or selfcare   Readmission Within the Last 30 Days no previous admission in last 30 days   Patient currently being followed by outpatient case management? No   Patient currently receives any other outside agency services? No   Equipment Currently Used at Home none   Do you have any problems affording any of your prescribed medications? No   Is the patient taking medications as prescribed? yes   Does the patient have transportation home? Yes   Transportation Anticipated family or friend will provide   Does the patient receive services at the " [facility] Clinic? No   Discharge Plan A Home with family   Discharge Plan B Home with family   DME Needed Upon Discharge  none   Patient/Family in Agreement with Plan yes       [facility] [place] - [address]  [address]  [place] [address]  Phone: [phone] Fax: [phone]

## 2020-01-08 NOTE — PLAN OF CARE
Blood sugar elevated gave 1 Unit of Novalog HS per order. Snack provided and remained NPO past midnight for heart cath this morning.

## 2020-01-09 VITALS
RESPIRATION RATE: 18 BRPM | BODY MASS INDEX: 26.07 KG/M2 | HEART RATE: 79 BPM | WEIGHT: 182.13 LBS | SYSTOLIC BLOOD PRESSURE: 154 MMHG | DIASTOLIC BLOOD PRESSURE: 69 MMHG | TEMPERATURE: 98 F | OXYGEN SATURATION: 97 % | HEIGHT: 70 IN

## 2020-01-09 LAB
ANION GAP SERPL CALC-SCNC: 9 MMOL/L (ref 8–16)
BASOPHILS # BLD AUTO: 0.03 K/UL (ref 0–0.2)
BASOPHILS NFR BLD: 0.3 % (ref 0–1.9)
BUN SERPL-MCNC: 26 MG/DL (ref 8–23)
CALCIUM SERPL-MCNC: 9.1 MG/DL (ref 8.7–10.5)
CHLORIDE SERPL-SCNC: 103 MMOL/L (ref 95–110)
CO2 SERPL-SCNC: 23 MMOL/L (ref 23–29)
CREAT SERPL-MCNC: 1 MG/DL (ref 0.5–1.4)
DIFFERENTIAL METHOD: ABNORMAL
EOSINOPHIL # BLD AUTO: 0 K/UL (ref 0–0.5)
EOSINOPHIL NFR BLD: 0 % (ref 0–8)
ERYTHROCYTE [DISTWIDTH] IN BLOOD BY AUTOMATED COUNT: 13.6 % (ref 11.5–14.5)
EST. GFR  (AFRICAN AMERICAN): >60 ML/MIN/1.73 M^2
EST. GFR  (NON AFRICAN AMERICAN): 58 ML/MIN/1.73 M^2
GLUCOSE SERPL-MCNC: 381 MG/DL (ref 70–110)
HCT VFR BLD AUTO: 32.8 % (ref 37–48.5)
HGB BLD-MCNC: 10.4 G/DL (ref 12–16)
IMM GRANULOCYTES # BLD AUTO: 0.07 K/UL (ref 0–0.04)
IMM GRANULOCYTES NFR BLD AUTO: 0.6 % (ref 0–0.5)
LYMPHOCYTES # BLD AUTO: 1.6 K/UL (ref 1–4.8)
LYMPHOCYTES NFR BLD: 13.8 % (ref 18–48)
MCH RBC QN AUTO: 27.2 PG (ref 27–31)
MCHC RBC AUTO-ENTMCNC: 31.7 G/DL (ref 32–36)
MCV RBC AUTO: 86 FL (ref 82–98)
MONOCYTES # BLD AUTO: 0.4 K/UL (ref 0.3–1)
MONOCYTES NFR BLD: 3.1 % (ref 4–15)
NEUTROPHILS # BLD AUTO: 9.2 K/UL (ref 1.8–7.7)
NEUTROPHILS NFR BLD: 82.2 % (ref 38–73)
NRBC BLD-RTO: 0 /100 WBC
PLATELET # BLD AUTO: 210 K/UL (ref 150–350)
PMV BLD AUTO: 13 FL (ref 9.2–12.9)
POCT GLUCOSE: 335 MG/DL (ref 70–110)
POCT GLUCOSE: 368 MG/DL (ref 70–110)
POTASSIUM SERPL-SCNC: 4 MMOL/L (ref 3.5–5.1)
RBC # BLD AUTO: 3.82 M/UL (ref 4–5.4)
SODIUM SERPL-SCNC: 135 MMOL/L (ref 136–145)
WBC # BLD AUTO: 11.22 K/UL (ref 3.9–12.7)

## 2020-01-09 PROCEDURE — 80048 BASIC METABOLIC PNL TOTAL CA: CPT | Mod: HCNC

## 2020-01-09 PROCEDURE — 85025 COMPLETE CBC W/AUTO DIFF WBC: CPT | Mod: HCNC

## 2020-01-09 PROCEDURE — 99217 PR OBSERVATION CARE DISCHARGE: ICD-10-PCS | Mod: HCNC,,, | Performed by: INTERNAL MEDICINE

## 2020-01-09 PROCEDURE — 25000003 PHARM REV CODE 250: Mod: HCNC | Performed by: INTERNAL MEDICINE

## 2020-01-09 PROCEDURE — 63600175 PHARM REV CODE 636 W HCPCS: Mod: HCNC | Performed by: INTERNAL MEDICINE

## 2020-01-09 PROCEDURE — 99217 PR OBSERVATION CARE DISCHARGE: CPT | Mod: HCNC,,, | Performed by: INTERNAL MEDICINE

## 2020-01-09 RX ORDER — NAPROXEN SODIUM 220 MG/1
81 TABLET, FILM COATED ORAL DAILY
Qty: 90 TABLET | Refills: 3 | Status: SHIPPED | OUTPATIENT
Start: 2020-01-09 | End: 2021-07-21 | Stop reason: SDUPTHER

## 2020-01-09 RX ORDER — NAPROXEN SODIUM 220 MG/1
81 TABLET, FILM COATED ORAL DAILY
Status: DISCONTINUED | OUTPATIENT
Start: 2020-01-09 | End: 2020-01-09 | Stop reason: HOSPADM

## 2020-01-09 RX ADMIN — DIPHENHYDRAMINE HYDROCHLORIDE 50 MG: 50 CAPSULE ORAL at 06:01

## 2020-01-09 RX ADMIN — PANTOPRAZOLE SODIUM 40 MG: 40 TABLET, DELAYED RELEASE ORAL at 09:01

## 2020-01-09 RX ADMIN — INSULIN ASPART 4 UNITS: 100 INJECTION, SOLUTION INTRAVENOUS; SUBCUTANEOUS at 04:01

## 2020-01-09 RX ADMIN — ISOSORBIDE MONONITRATE 30 MG: 30 TABLET, EXTENDED RELEASE ORAL at 09:01

## 2020-01-09 RX ADMIN — INSULIN ASPART 10 UNITS: 100 INJECTION, SOLUTION INTRAVENOUS; SUBCUTANEOUS at 09:01

## 2020-01-09 RX ADMIN — Medication 400 MG: at 09:01

## 2020-01-09 RX ADMIN — ASPIRIN 81 MG 81 MG: 81 TABLET ORAL at 09:01

## 2020-01-09 RX ADMIN — PREDNISONE 50 MG: 20 TABLET ORAL at 06:01

## 2020-01-09 RX ADMIN — FLUOXETINE 20 MG: 20 CAPSULE ORAL at 09:01

## 2020-01-09 RX ADMIN — ATENOLOL 50 MG: 50 TABLET ORAL at 09:01

## 2020-01-09 RX ADMIN — SODIUM CHLORIDE: 0.9 INJECTION, SOLUTION INTRAVENOUS at 04:01

## 2020-01-09 RX ADMIN — HYDROCHLOROTHIAZIDE 25 MG: 25 TABLET ORAL at 09:01

## 2020-01-09 RX ADMIN — TICAGRELOR 90 MG: 90 TABLET ORAL at 09:01

## 2020-01-09 NOTE — PROGRESS NOTES
WRITTEN HEALTHCARE DISCHARGE INFORMATION      Things that YOU are RESPONSIBLE for to Manage Your Care At Home:     1. Getting your prescriptions filled.  2. Taking you medications as directed. DO NOT MISS ANY DOSES!  3. Going to your follow-up doctor appointments. This is important because it allows the doctor to monitor your progress and to determine if any changes need to be made to your treatment plan.     If you are unable to make your follow up appointments, please call the number listed and reschedule this appointment.      ____________HELP AT HOME____________________     Experiencing any SIGNS or SYMPTOMS: YOU CAN     Schedule a same day appopintment with your Primary Care Doctor or  you can call [facility] for 24/7 assistance at [phone]     If you are experience any signs or symptoms that have become severe, Call 911 and come to your nearest Emergency Room.     Thank you for choosing [facility] and allowing us to care for you.   From your care management team:      You should receive a call from [facility] within 48-72 hours to help manage your care after discharge. Please try to make sure that you answer your phone for this important phone call.    Follow-up Information     Kenneth Luu MD On 2/6/2020.    Specialty:  Internal Medicine  Why:  out patient services: 2:30pm follow up from the hospital. Requested sooner date.  Contact information:  [address]  [facility] [address]  [phone]             Christos Monreal MD On 1/16/2020.    Specialties:  Cardiology, INTERVENTIONAL CARDIOLOGY  Why:  out patient services: 1:20pm post op  Contact information:  [address]  [address]  [facility] [address]  [phone]

## 2020-01-09 NOTE — PROGRESS NOTES
Trying to get up out of bed w/o assistance. Staff trying to re-direct but said she is getting up anyway cause she has to use bathroom and is not using bedpan. Stayed with pt while staff got bedside commode. Tearful and verbalized that she does not like people taking care of her. States that she is depressed and has not cried  In a long time. Provided time to verbalized feelings.  B/P 171/72 , P 84, O2 96% on RA. PRN antianxiety med offered, accepted and administered. Bed alarm on with call light at side.

## 2020-01-09 NOTE — NURSING
Report received from FRANCIS Lopez from cath lab. Patient with right radial site, vasband in place with 15 cc air. Start releasing air at 6:50pm. 2 stents were placed. Bedrest for 6 hours, up at 10:50pm.

## 2020-01-09 NOTE — NURSING
End of shift bedside report given to AVRIL Harris. Patient in no apparent distress.     12 hour chart check complete.

## 2020-01-09 NOTE — PROGRESS NOTES
Notified Dr. Keith of  blood sugar 448. Gave 3 Units of Aspart as ordered. Rechecked blood sugar 1.5 hr later and was 372. Explained that she does have another dose of Prednisone ordered for the night. Informed him that the PRN insulin is a low dose sliding scale. He ordered to use a moderate sliding scale, proceed with schedule medications and recheck blood sugar Q 4 hr. And treat accordingly. Also ordered to consult Nocturnalist for any further insulin orders.

## 2020-01-09 NOTE — NURSING
Patient returned to floor from cath lab. Vitals stable, vasband in place, site clean with no bleeding noted. Pulses palpable. Will continue to monitor.

## 2020-01-09 NOTE — DISCHARGE SUMMARY
[facility]  Cardiology  Discharge Summary      Patient Name: Lorena Contreras  MRN: [id number]  Admission Date: 1/7/2020  Hospital Length of Stay: 0 days  Discharge Date and Time:  01/09/2020 8:47 AM  Attending Physician: Christos Monreal MD    Discharging Provider: Christos Monreal MD  Primary Care Physician: Kenneth Luu MD    HPI:      Patient is a pleasant 69-year-old lady.  Past medical history significant for coronary artery disease status post PCI of RCA.  Underwent recent cardiac catheterization by Dr. Rico which showed a 90% mid high OM1/ramus lesion as well as mid circ long 80-90% diffusely disease stenosis.  Patient was put on Imdur or for a trial of medical management, however could not tolerate the Imdur or.  States his lower cheek to Imdur or and has to take Benadryl with the Imdur or.  Continues having chest pains.  Denies orthopnea, PND, swelling feet.  Has been referred by Dr. Rico to me for further evaluation and consideration of PCI.      Patient has aspirin allergy.  Has been admitted today for aspirin desensitization.  After aspirin desensitization done plan is for coronary angiogram and cardiac catheterization tomorrow.     Cardiac catheterization November 2019:  Films were personally reviewed by me.     · Patent RCA mid stents with 50% lesion after stents  · Prox Cx to Dist Cx lesion , 95% stenosed.  · Ost 1st Mrg to 1st Mrg lesion , 90% stenosed.  · LVEDP (Pre): 16     Echo November 2019:  · Normal left ventricular systolic function. The estimated ejection fraction is 60%  · Concentric left ventricular hypertrophy.  · Grade II (moderate) left ventricular diastolic dysfunction consistent with pseudonormalization.  · Normal right ventricular systolic function.  · Moderate left atrial enlargement.  · Mild tricuspid regurgitation.  · The estimated PA systolic pressure is 50 mm Hg  Pulmonary hypertension present.    Procedure(s) (LRB):  Left heart cath, right radial, noon  start (Left)  Ultrasound Guidance  Stent, Coronary, Multi Vessel  IVUS, Coronary     Indwelling Lines/Drains at time of discharge:  Lines/Drains/Airways     None                 Hospital Course:  Underwent PCI of ramus and lcx. No complications. Al;so underwent aspirin densensitization prior to PCI.     Significant Diagnostic Studies: Angiography:     · LVEDP (Pre): 7  · Estimated blood loss: <50 mL     1.  Successful PCI of proximal ramus with drug-eluting stent x1 (2.0 x 6 mm).  IVUS guidance was utilized for this PCI.  Post PCI IVUS demonstrated well-opposed and expanded stent.  MADINA 3 flow pre and post PCI     2.  Successful PCI of circumflex with drug-eluting stent x1 (2.25 x 22 mm).  MADINA 3 flow pre and post PCI           Details:     Left main:  No significant stenosis  Lad:  Mid 50% stenosis  Circumflex:  Mid 85-90% stenosis  Ramus:  Proximal 90% stenosis  RCA:  Previously placed RCA stents are patent.  Mid 30% stenosis.        Access: US guided access of RRA.      Assessment and plan     Aspirin 81 mg daily indefinitely  Brilinta 90 mg b.i.d. for at least 1 year uninterrupted and longer if no contraindications  Aggressive risk factor modification  Follow-up in Cardiology Clinic        Pending Diagnostic Studies:     None          Final Active Diagnoses:    Diagnosis Date Noted POA    PRINCIPAL PROBLEM:  Coronary artery disease involving native coronary artery of native heart [I25.10] 03/29/2019 Yes    CAD (coronary artery disease) [I25.10] 12/30/2019 Yes      Problems Resolved During this Admission:     No new Assessment & Plan notes have been filed under this hospital service since the last note was generated.  Service: Cardiology      Discharged Condition: good    Disposition: Home or Self Care    Follow Up:  Follow-up Information     Kenneth Luu MD On 2/6/2020.    Specialty:  Internal Medicine  Why:  out patient services: 2:30pm follow up from the hospital. Requested sooner date.  Contact  information:  [address]  [facility] [address]  [phone]             Christos Monreal MD On 1/16/2020.    Specialties:  Cardiology, INTERVENTIONAL CARDIOLOGY  Why:  out patient services: 1:20pm post op  Contact information:  [address]  [address]  [place] [address]  [phone]                 Patient Instructions:   No discharge procedures on file.  Medications:  Reconciled Home Medications:      Medication List      START taking these medications    aspirin 81 MG Chew  Take 1 tablet (81 mg total) by mouth once daily.        CHANGE how you take these medications    tolnaftate 1 % cream  Commonly known as:  TINACTIN  Apply topically 2 (two) times daily.  What changed:    · when to take this  · reasons to take this        CONTINUE taking these medications    amLODIPine 10 MG tablet  Commonly known as:  NORVASC  TAKE ONE TABLET BY MOUTH ONCE DAILY     atenolol 50 MG tablet  Commonly known as:  TENORMIN  Take 1 tablet (50 mg total) by mouth 2 (two) times daily.     atorvastatin 80 MG tablet  Commonly known as:  LIPITOR  Take 1 tablet (80 mg total) by mouth every evening.     EPINEPHrine 0.3 mg/0.3 mL Atin  Commonly known as:  EPIPEN  Inject 0.3 mLs (0.3 mg total) into the muscle once. for 1 dose     esomeprazole 20 MG capsule  Commonly known as:  NEXIUM  Take 20 mg by mouth before breakfast.     FLUoxetine 20 MG capsule  Take 1 capsule (20 mg total) by mouth once daily.     FreeStyle Test Strp  Generic drug:  blood sugar diagnostic  1 strip by Misc.(Non-Drug; Combo Route) route 4 (four) times daily.     furosemide 20 MG tablet  Commonly known as:  LASIX  Take 1 tablet (20 mg total) by mouth daily as needed (leg swelling).     hydroCHLOROthiazide 25 MG tablet  Commonly known as:  HYDRODIURIL  TAKE 1 TABLET BY MOUTH ONCE DAILY     hydrOXYzine HCl 10 MG Tab  Commonly known as:  ATARAX  Take 1 tablet (10 mg total) by mouth 3 (three) times daily as needed.     insulin aspart protamine-insulin aspart 100  "unit/mL (70-30) Inpn pen  Commonly known as:  NovoLOG Mix 70-30FlexPen U-100  Inject 30 Units into the skin 2 (two) times daily before meals.     isosorbide mononitrate 30 MG 24 hr tablet  Commonly known as:  IMDUR  Take 1 tablet (30 mg total) by mouth once daily.     Januvia 100 MG Tab  Generic drug:  SITagliptin  TAKE 1 TABLET BY MOUTH ONCE DAILY     lancets 32 gauge Misc  1 lancet by Misc.(Non-Drug; Combo Route) route 4 (four) times daily.     magnesium oxide 400 mg (241.3 mg magnesium) tablet  Commonly known as:  MAG-OX  Take 400 mg by mouth once daily.     meclizine 25 mg tablet  Commonly known as:  ANTIVERT  Take 1 tablet (25 mg total) by mouth 3 (three) times daily as needed.     metFORMIN 1000 MG tablet  Commonly known as:  GLUCOPHAGE  TAKE 1 TABLET BY MOUTH TWICE DAILY WITH MEALS     nitroGLYCERIN 0.4 MG SL tablet  Commonly known as:  NITROSTAT  Place 1 tablet (0.4 mg total) under the tongue every 5 (five) minutes as needed for Chest pain.     pantoprazole 40 MG tablet  Commonly known as:  PROTONIX  Take 1 tablet (40 mg total) by mouth once daily.     pen needle, diabetic 32 gauge x 5/32" Ndle  Use with Novolog Mix Flexpens     ticagrelor 90 mg tablet  Commonly known as:  BRILINTA  Take 1 tablet (90 mg total) by mouth 2 (two) times daily.     triamcinolone acetonide 0.1% 0.1 % cream  Commonly known as:  KENALOG  APPLY  CREAM EXTERNALLY TO AFFECTED AREA TWICE DAILY AS NEEDED     Vitamin D3 50 mcg (2,000 unit) Cap  Generic drug:  cholecalciferol (vitamin D3)  Take 2 capsules by mouth 2 (two) times daily.            Time spent on the discharge of patient: 35 minutes    Christos Monreal MD  Cardiology  [facility]  "

## 2020-01-09 NOTE — NURSING
Bedside report received from AVRIL Harris. Patient is awake and alert. Patient appears to be in no apparent distress. Safety maintained. Will continue to monitor.

## 2020-01-10 ENCOUNTER — TELEPHONE (OUTPATIENT)
Dept: INTERNAL MEDICINE | Facility: CLINIC | Age: 70
End: 2020-01-10

## 2020-01-10 ENCOUNTER — HOSPITAL ENCOUNTER (EMERGENCY)
Facility: HOSPITAL | Age: 70
Discharge: HOME | End: 2020-01-10
Attending: EMERGENCY MEDICINE
Payer: MEDICARE

## 2020-01-10 VITALS
HEIGHT: 70 IN | HEART RATE: 75 BPM | OXYGEN SATURATION: 100 % | WEIGHT: 184 LBS | SYSTOLIC BLOOD PRESSURE: 133 MMHG | BODY MASS INDEX: 26.34 KG/M2 | DIASTOLIC BLOOD PRESSURE: 47 MMHG | RESPIRATION RATE: 18 BRPM | TEMPERATURE: 97 F

## 2020-01-10 DIAGNOSIS — I15.2 HYPERTENSION ASSOCIATED WITH DIABETES: ICD-10-CM

## 2020-01-10 DIAGNOSIS — E11.59 HYPERTENSION ASSOCIATED WITH DIABETES: ICD-10-CM

## 2020-01-10 DIAGNOSIS — K04.7 DENTAL ABSCESS: Primary | ICD-10-CM

## 2020-01-10 LAB — POCT GLUCOSE: 64 MG/DL (ref 70–110)

## 2020-01-10 PROCEDURE — 82962 GLUCOSE BLOOD TEST: CPT | Mod: HCNC,ER

## 2020-01-10 PROCEDURE — 99283 EMERGENCY DEPT VISIT LOW MDM: CPT | Mod: HCNC,ER

## 2020-01-10 RX ORDER — AMOXICILLIN AND CLAVULANATE POTASSIUM 875; 125 MG/1; MG/1
1 TABLET, FILM COATED ORAL 2 TIMES DAILY
Qty: 14 TABLET | Refills: 0 | Status: SHIPPED | OUTPATIENT
Start: 2020-01-10 | End: 2020-02-06

## 2020-01-10 NOTE — ED PROVIDER NOTES
Encounter Date: 1/10/2020    SCRIBE #1 NOTE: I, Esperanza La, nicola scribing for, and in the presence of,  Dr. Matias . I have scribed the following portions of the note - Other sections scribed: HPI, ROS, PE .       History     Chief Complaint   Patient presents with    Recurrent Skin Infections     right jaw since this am, no drainage, no fever.      This is a 69 year old female with DM, HTN, CAD presents to the ED complaining of right jaw pain since this morning. She denies fever, chills, sore throat, or trouble swallowing. She reports that she has a broken tooth on the right lower side. She has an appointment scheduled with a dentist on Monday for evaluation. She reports that today however she noted facial swelling. She reports mild increased pain. She called her doctor and asked him to call her in antibiotics and was directed to come to the ER. She denies any difficulty swallowing. No fevers or chills.  She has been taking tylenol OTC with improvement in her pain.    The history is provided by the patient. No  was used.     Review of patient's allergies indicates:   Allergen Reactions    Novolin 70/30 (semi-synthetic) Nausea And Vomiting     Severe vomiting on Relion 70/30    Shellfish containing products Swelling    Sulfa (sulfonamide antibiotics) Anaphylaxis    Victoza [liraglutide] Nausea And Vomiting    Glipizide Nausea Only    Citric acid     Codeine     Egg derived     Iodine and iodide containing products     Rituxan [rituximab]     Soy      Patient states she is not allergic to soy    Talwin [pentazocine lactate]     Zoloft [sertraline]      Past Medical History:   Diagnosis Date    Anxiety     Colon polyps     Coronary artery disease     Depression     Diabetes mellitus, type II     Fibromyalgia     Follicular lymphoma     HTN (hypertension)     MI (myocardial infarction) 03/2019    Restless leg syndrome      Past Surgical History:   Procedure Laterality Date     COLONOSCOPY  11/07/2012    Colon polyp found; repeat in 5 years    ELBOW SURGERY Right     dislocation repair     ESOPHAGOGASTRODUODENOSCOPY  11/07/2012    atrophic gastritis, H pylori testing negative    KNEE SURGERY Bilateral     scoped    LEFT HEART CATHETERIZATION Left 3/29/2019    Procedure: Left heart cath;  Surgeon: Bladimir Barbosa MD;  Location: [facility] CATH LAB;  Service: Cardiology;  Laterality: Left;    LEFT HEART CATHETERIZATION Left 11/18/2019    Procedure: Left heart cath;  Surgeon: Karl Rico MD;  Location: [facility] CATH LAB;  Service: Cardiology;  Laterality: Left;    LEFT HEART CATHETERIZATION Left 1/8/2020    Procedure: Left heart cath, right radial, noon start;  Surgeon: Christos Monreal MD;  Location: [facility] CATH LAB;  Service: Cardiology;  Laterality: Left;  RN Pre Op 1-6-20.  To be admitted 1-7-20 sor Aspirin Disensitation    ULTRASOUND GUIDANCE  1/8/2020    Procedure: Ultrasound Guidance;  Surgeon: Christos Monreal MD;  Location: [facility] CATH LAB;  Service: Cardiology;;     Family History   Problem Relation Age of Onset    Cancer Mother     Heart disease Mother     Cancer Father         lung    Diabetes Sister     Hypertension Sister     Stroke Neg Hx     Hyperlipidemia Neg Hx      Social History     Tobacco Use    Smoking status: Never Smoker    Smokeless tobacco: Never Used   Substance Use Topics    Alcohol use: No    Drug use: Never     Review of Systems   Constitutional: Negative for chills and fever.   HENT: Positive for dental problem (with right jaw pain ). Negative for congestion, sore throat and trouble swallowing.    Respiratory: Negative for cough and shortness of breath.    Cardiovascular: Negative for chest pain and palpitations.   Gastrointestinal: Negative for abdominal pain, diarrhea, nausea and vomiting.   Musculoskeletal: Negative for back pain and neck pain.   Neurological: Negative for weakness, numbness and headaches.   All other systems reviewed and are  negative.      Physical Exam     Initial Vitals [01/10/20 1425]   BP Pulse Resp Temp SpO2   (!) 133/47 75 18 97.3 °F (36.3 °C) 100 %      MAP       --         Physical Exam    Nursing note and vitals reviewed.  Constitutional: She appears well-developed and well-nourished. She is not diaphoretic. No distress.   HENT:   Head: Normocephalic and atraumatic.   Mouth/Throat: Uvula is midline, oropharynx is clear and moist and mucous membranes are normal. No trismus in the jaw. Dental abscesses present. No uvula swelling. No oropharyngeal exudate, posterior oropharyngeal edema, posterior oropharyngeal erythema or tonsillar abscesses.   right sided facial swelling with dental decay noted on the right. No gum swelling or oropharyngeal swelling.    Eyes: Conjunctivae and EOM are normal. Pupils are equal, round, and reactive to light.   Neck: Normal range of motion. Neck supple.   Cardiovascular: Normal rate and regular rhythm.   No murmur heard.  Pulmonary/Chest: Breath sounds normal. She has no wheezes.   Musculoskeletal: Normal range of motion. She exhibits no tenderness.   Neurological: She is alert and oriented to person, place, and time. She has normal strength.   Skin: Skin is warm and dry. Capillary refill takes less than 2 seconds.   Psychiatric: She has a normal mood and affect. Her behavior is normal.         ED Course   Procedures  Labs Reviewed   POCT GLUCOSE - Abnormal; Notable for the following components:       Result Value    POCT Glucose 64 (*)     All other components within normal limits          Imaging Results    None          Medical Decision Making:   Initial Assessment:   This is a 69-year-old female with extensive prior medical history including hypertension, hyperlipidemia, coronary artery disease who comes in complaining of dental pain and facial swelling. On examination her vitals are stable.  She is afebrile.  She has widespread dental decay with swelling of the right upper cheek.  She does have  a broken right upper molar.  She has no gum swelling. She has no fluctuance.  Her oropharynx is benign.  Her exam is normal otherwise.  Patient will be discharged with Augmentin.  She has follow-up in 2 days in dental clinic.  She was advised to return to the ER for any increased pain or swelling, any difficulty swallowing or any other concerns.  Differential Diagnosis:   Odontogenic infection, dental abscess, facial abscess, Ernst's angina, dental caries            Scribe Attestation:   Scribe #1: I performed the above scribed service and the documentation accurately describes the services I performed. I attest to the accuracy of the note.    Attending Attestation:           Physician Attestation for Scribe:  Physician Attestation Statement for Scribe #1: I, Caroline Matias, reviewed documentation, as scribed by Esperanza Callaway in my presence, and it is both accurate and complete.                         Clinical Impression:     1. Dental abscess            Disposition:   Disposition: Discharged  Condition: Stable                     Caroline Matias MD  01/10/20 7409

## 2020-01-10 NOTE — TELEPHONE ENCOUNTER
----- Message from Raudel Dominguez sent at 1/10/2020 10:26 AM CST -----  Contact: Patient  Patient called in to speak with someone at the office regarding her swollen mouth and would like to know if the doctor can prescribed her antibiotics. The patient attempted to make an appointment with the dentist but there were nothing available until Monday. The patient would like a call from the office and can be reached at    [phone]

## 2020-01-10 NOTE — TELEPHONE ENCOUNTER
Pt states that she has an abscessed tooth and she recently had heart stents placed. Her jaw is swollen. She cannot see her dentist until next week. Her dentist refused to call in an antibiotic. Can you call in an antibiotic? See numerous allergies.

## 2020-01-13 RX ORDER — AMLODIPINE BESYLATE 10 MG/1
TABLET ORAL
Qty: 90 TABLET | Refills: 0 | Status: SHIPPED | OUTPATIENT
Start: 2020-01-13 | End: 2020-01-16

## 2020-01-16 ENCOUNTER — OFFICE VISIT (OUTPATIENT)
Dept: CARDIOLOGY | Facility: CLINIC | Age: 70
End: 2020-01-16
Payer: MEDICARE

## 2020-01-16 VITALS
HEIGHT: 70 IN | BODY MASS INDEX: 26.82 KG/M2 | SYSTOLIC BLOOD PRESSURE: 183 MMHG | WEIGHT: 187.38 LBS | HEART RATE: 87 BPM | OXYGEN SATURATION: 99 % | DIASTOLIC BLOOD PRESSURE: 77 MMHG

## 2020-01-16 DIAGNOSIS — E11.59 HYPERTENSION ASSOCIATED WITH DIABETES: ICD-10-CM

## 2020-01-16 DIAGNOSIS — I25.10 CORONARY ARTERY DISEASE, ANGINA PRESENCE UNSPECIFIED, UNSPECIFIED VESSEL OR LESION TYPE, UNSPECIFIED WHETHER NATIVE OR TRANSPLANTED HEART: Primary | ICD-10-CM

## 2020-01-16 DIAGNOSIS — I15.2 HYPERTENSION ASSOCIATED WITH DIABETES: ICD-10-CM

## 2020-01-16 PROCEDURE — 1125F PR PAIN SEVERITY QUANTIFIED, PAIN PRESENT: ICD-10-PCS | Mod: HCNC,S$GLB,, | Performed by: INTERNAL MEDICINE

## 2020-01-16 PROCEDURE — 1125F AMNT PAIN NOTED PAIN PRSNT: CPT | Mod: HCNC,S$GLB,, | Performed by: INTERNAL MEDICINE

## 2020-01-16 PROCEDURE — 3078F PR MOST RECENT DIASTOLIC BLOOD PRESSURE < 80 MM HG: ICD-10-PCS | Mod: HCNC,CPTII,S$GLB, | Performed by: INTERNAL MEDICINE

## 2020-01-16 PROCEDURE — 99999 PR PBB SHADOW E&M-EST. PATIENT-LVL III: ICD-10-PCS | Mod: PBBFAC,HCNC,, | Performed by: INTERNAL MEDICINE

## 2020-01-16 PROCEDURE — 3078F DIAST BP <80 MM HG: CPT | Mod: HCNC,CPTII,S$GLB, | Performed by: INTERNAL MEDICINE

## 2020-01-16 PROCEDURE — 1101F PT FALLS ASSESS-DOCD LE1/YR: CPT | Mod: HCNC,CPTII,S$GLB, | Performed by: INTERNAL MEDICINE

## 2020-01-16 PROCEDURE — 1101F PR PT FALLS ASSESS DOC 0-1 FALLS W/OUT INJ PAST YR: ICD-10-PCS | Mod: HCNC,CPTII,S$GLB, | Performed by: INTERNAL MEDICINE

## 2020-01-16 PROCEDURE — 3077F PR MOST RECENT SYSTOLIC BLOOD PRESSURE >= 140 MM HG: ICD-10-PCS | Mod: HCNC,CPTII,S$GLB, | Performed by: INTERNAL MEDICINE

## 2020-01-16 PROCEDURE — 99214 OFFICE O/P EST MOD 30 MIN: CPT | Mod: HCNC,S$GLB,, | Performed by: INTERNAL MEDICINE

## 2020-01-16 PROCEDURE — 1159F MED LIST DOCD IN RCRD: CPT | Mod: HCNC,S$GLB,, | Performed by: INTERNAL MEDICINE

## 2020-01-16 PROCEDURE — 1159F PR MEDICATION LIST DOCUMENTED IN MEDICAL RECORD: ICD-10-PCS | Mod: HCNC,S$GLB,, | Performed by: INTERNAL MEDICINE

## 2020-01-16 PROCEDURE — 99214 PR OFFICE/OUTPT VISIT, EST, LEVL IV, 30-39 MIN: ICD-10-PCS | Mod: HCNC,S$GLB,, | Performed by: INTERNAL MEDICINE

## 2020-01-16 PROCEDURE — 3077F SYST BP >= 140 MM HG: CPT | Mod: HCNC,CPTII,S$GLB, | Performed by: INTERNAL MEDICINE

## 2020-01-16 PROCEDURE — 99999 PR PBB SHADOW E&M-EST. PATIENT-LVL III: CPT | Mod: PBBFAC,HCNC,, | Performed by: INTERNAL MEDICINE

## 2020-01-16 RX ORDER — AMLODIPINE BESYLATE 10 MG/1
10 TABLET ORAL DAILY
Qty: 90 TABLET | Refills: 3 | Status: SHIPPED | OUTPATIENT
Start: 2020-01-16 | End: 2020-06-29 | Stop reason: SDUPTHER

## 2020-01-16 NOTE — PROGRESS NOTES
CARDIOVASCULAR CONSULTATION    REASON FOR CONSULT:   Lorena Contreras is a 69 y.o. female who presents for evaluation of PCI.      HISTORY OF PRESENT ILLNESS:     Patient is a pleasant 69-year-old lady.  Past medical history significant for coronary artery disease status post PCI of RCA.  Underwent recent cardiac catheterization by Dr. Rico which showed a 90% mid high OM1/ramus lesion as well as mid circ long 80-90% diffusely disease stenosis.  Patient was put on Imdur or for a trial of medical management, however could not tolerate the Imdur or.  States his lower cheek to Imdur or and has to take Benadryl with the Imdur or.  Continues having chest pains.  Denies orthopnea, PND, swelling feet.  Has been referred by Dr. Rico to me for further evaluation and consideration of PCI.    Cardiac catheterization November 2019:  Films were personally reviewed by me.    · Patent RCA mid stents with 50% lesion after stents  · Prox Cx to Dist Cx lesion , 95% stenosed.  · Ost 1st Mrg to 1st Mrg lesion , 90% stenosed.  · LVEDP (Pre): 16    Echo November 2019:  · Normal left ventricular systolic function. The estimated ejection fraction is 60%  · Concentric left ventricular hypertrophy.  · Grade II (moderate) left ventricular diastolic dysfunction consistent with pseudonormalization.  · Normal right ventricular systolic function.  · Moderate left atrial enlargement.  · Mild tricuspid regurgitation.  · The estimated PA systolic pressure is 50 mm Hg  · Pulmonary hypertension present.    Notes from January 2020:  Patient here for follow-up after her radial catheterization.  She has some bruising of her right forearm.  No tenderness or pain.  No motor changes no hematoma.  She has some bruising of the left arm also where IV for hydration and sedation was placed.  Radial pulses present.  States that the chest pains have gone away and symptomatic [place] feels much better.  After revascularization.  She underwent revascularization  of ramus as well as circumflex during her last cardiac catheterization.  Blood pressure is elevated in clinic, but has run out of her Norvasc.    PAST MEDICAL HISTORY:     Past Medical History:   Diagnosis Date    Anxiety     Colon polyps     Coronary artery disease     Depression     Diabetes mellitus, type II     Fibromyalgia     Follicular lymphoma     HTN (hypertension)     MI (myocardial infarction) 03/2019    Restless leg syndrome        PAST SURGICAL HISTORY:     Past Surgical History:   Procedure Laterality Date    COLONOSCOPY  11/07/2012    Colon polyp found; repeat in 5 years    ELBOW SURGERY Right     dislocation repair     ESOPHAGOGASTRODUODENOSCOPY  11/07/2012    atrophic gastritis, H pylori testing negative    KNEE SURGERY Bilateral     scoped    LEFT HEART CATHETERIZATION Left 3/29/2019    Procedure: Left heart cath;  Surgeon: Bladimir Barbosa MD;  Location: [facility] CATH LAB;  Service: Cardiology;  Laterality: Left;    LEFT HEART CATHETERIZATION Left 11/18/2019    Procedure: Left heart cath;  Surgeon: Karl Rico MD;  Location: [facility] CATH LAB;  Service: Cardiology;  Laterality: Left;    LEFT HEART CATHETERIZATION Left 1/8/2020    Procedure: Left heart cath, right radial, noon start;  Surgeon: Christos Monreal MD;  Location: [facility] CATH LAB;  Service: Cardiology;  Laterality: Left;  RN Pre Op 1-6-20.  To be admitted 1-7-20 sor Aspirin Disensitation    ULTRASOUND GUIDANCE  1/8/2020    Procedure: Ultrasound Guidance;  Surgeon: Christos Monreal MD;  Location: [facility] CATH LAB;  Service: Cardiology;;       ALLERGIES AND MEDICATION:     Review of patient's allergies indicates:   Allergen Reactions    Novolin 70/30 (semi-synthetic) Nausea And Vomiting     Severe vomiting on Relion 70/30    Shellfish containing products Swelling    Sulfa (sulfonamide antibiotics) Anaphylaxis    Victoza [liraglutide] Nausea And Vomiting    Glipizide Nausea Only    Citric acid     Codeine     Egg derived      Iodine and iodide containing products     Rituxan [rituximab]     Soy      Patient states she is not allergic to soy    Talwin [pentazocine lactate]     Zoloft [sertraline]         Medication List           Accurate as of January 16, 2020  1:41 PM. If you have any questions, ask your nurse or doctor.               CHANGE how you take these medications    tolnaftate 1 % cream  Commonly known as:  TINACTIN  Apply topically 2 (two) times daily.  What changed:    · when to take this  · reasons to take this        CONTINUE taking these medications    amLODIPine 10 MG tablet  Commonly known as:  NORVASC  Take 1 tablet (10 mg total) by mouth once daily.     amoxicillin-clavulanate 875-125mg 875-125 mg per tablet  Commonly known as:  AUGMENTIN  Take 1 tablet by mouth 2 (two) times daily.     aspirin 81 MG Chew  Take 1 tablet (81 mg total) by mouth once daily.     atenolol 50 MG tablet  Commonly known as:  TENORMIN  Take 1 tablet (50 mg total) by mouth 2 (two) times daily.     atorvastatin 80 MG tablet  Commonly known as:  LIPITOR  Take 1 tablet (80 mg total) by mouth every evening.     EPINEPHrine 0.3 mg/0.3 mL Atin  Commonly known as:  EPIPEN  Inject 0.3 mLs (0.3 mg total) into the muscle once. for 1 dose     esomeprazole 20 MG capsule  Commonly known as:  NEXIUM     FLUoxetine 20 MG capsule  Take 1 capsule (20 mg total) by mouth once daily.     FreeStyle Test Strp  Generic drug:  blood sugar diagnostic     furosemide 20 MG tablet  Commonly known as:  LASIX  Take 1 tablet (20 mg total) by mouth daily as needed (leg swelling).     hydroCHLOROthiazide 25 MG tablet  Commonly known as:  HYDRODIURIL  TAKE 1 TABLET BY MOUTH ONCE DAILY     hydrOXYzine HCl 10 MG Tab  Commonly known as:  ATARAX  Take 1 tablet (10 mg total) by mouth 3 (three) times daily as needed.     insulin aspart protamine-insulin aspart 100 unit/mL (70-30) Inpn pen  Commonly known as:  NovoLOG Mix 70-30FlexPen U-100  Inject 30 Units into the skin 2  "(two) times daily before meals.     isosorbide mononitrate 30 MG 24 hr tablet  Commonly known as:  IMDUR  Take 1 tablet (30 mg total) by mouth once daily.     Januvia 100 MG Tab  Generic drug:  SITagliptin  TAKE 1 TABLET BY MOUTH ONCE DAILY     lancets 32 gauge Misc  1 lancet by Misc.(Non-Drug; Combo Route) route 4 (four) times daily.     magnesium oxide 400 mg (241.3 mg magnesium) tablet  Commonly known as:  MAG-OX     meclizine 25 mg tablet  Commonly known as:  ANTIVERT  Take 1 tablet (25 mg total) by mouth 3 (three) times daily as needed.     metFORMIN 1000 MG tablet  Commonly known as:  GLUCOPHAGE  TAKE 1 TABLET BY MOUTH TWICE DAILY WITH MEALS     nitroGLYCERIN 0.4 MG SL tablet  Commonly known as:  NITROSTAT  Place 1 tablet (0.4 mg total) under the tongue every 5 (five) minutes as needed for Chest pain.     pantoprazole 40 MG tablet  Commonly known as:  PROTONIX  Take 1 tablet (40 mg total) by mouth once daily.     pen needle, diabetic 32 gauge x 5/32" Ndle  Use with Novolog Mix Flexpens     ticagrelor 90 mg tablet  Commonly known as:  BRILINTA  Take 1 tablet (90 mg total) by mouth 2 (two) times daily.     triamcinolone acetonide 0.1% 0.1 % cream  Commonly known as:  KENALOG  APPLY  CREAM EXTERNALLY TO AFFECTED AREA TWICE DAILY AS NEEDED     Vitamin D3 50 mcg (2,000 unit) Cap  Generic drug:  cholecalciferol (vitamin D3)           Where to Get Your Medications      These medications were sent to [facility] - [place] - [address]  [address] [place] [address]    Phone:  [phone]   · amLODIPine 10 MG tablet         SOCIAL HISTORY:     Social History     Socioeconomic History    Marital status:      Spouse name: Not on file    Number of children: Not on file    Years of education: Not on file    Highest education level: Not on file   Occupational History    Not on file   Social Needs    Financial resource strain: Not on file    Food insecurity:     " "Worry: Not on file     Inability: Not on file    Transportation needs:     Medical: Not on file     Non-medical: Not on file   Tobacco Use    Smoking status: Never Smoker    Smokeless tobacco: Never Used   Substance and Sexual Activity    Alcohol use: No    Drug use: Never    Sexual activity: Not Currently     Partners: Male   Lifestyle    Physical activity:     Days per week: Not on file     Minutes per session: Not on file    Stress: Not on file   Relationships    Social connections:     Talks on phone: Not on file     Gets together: Not on file     Attends Yazidi service: Not on file     Active member of club or organization: Not on file     Attends meetings of clubs or organizations: Not on file     Relationship status: Not on file   Other Topics Concern    Not on file   Social History Narrative    Not on file       FAMILY HISTORY:     Family History   Problem Relation Age of Onset    Cancer Mother     Heart disease Mother     Cancer Father         lung    Diabetes Sister     Hypertension Sister     Stroke Neg Hx     Hyperlipidemia Neg Hx        REVIEW OF SYSTEMS:   Review of Systems   Constitution: Negative.   HENT: Negative.    Eyes: Negative.    Respiratory: Negative.    Endocrine: Negative.    Hematologic/Lymphatic: Negative.    Skin: Negative.    Musculoskeletal: Negative.    Gastrointestinal: Negative.    Genitourinary: Negative.    Neurological: Negative.    Psychiatric/Behavioral: Negative.    Allergic/Immunologic: Negative.        A 10 point review of systems was performed and all the pertinent positives have been mentioned. Rest of review of systems was negative.        PHYSICAL EXAM:     Vitals:    01/16/20 1325   BP: (!) 183/77   Pulse: 87    Body mass index is 26.89 kg/m².  Weight: 85 kg (187 lb 6.3 oz)   Height: 5' 10" (177.8 cm)     Physical Exam   Constitutional: She is oriented to person, place, and time. She appears well-developed and well-nourished.   HENT:   Head: " Normocephalic.   Eyes: Pupils are equal, round, and reactive to light.   Neck: Normal range of motion. Neck supple.   Cardiovascular: Normal rate and regular rhythm.   Pulmonary/Chest: Effort normal and breath sounds normal.   Abdominal: Soft. Normal appearance and bowel sounds are normal. There is no tenderness.   Musculoskeletal: Normal range of motion.   Neurological: She is alert and oriented to person, place, and time.   Skin: Skin is warm.   Psychiatric: She has a normal mood and affect.         DATA:     Laboratory:  CBC:  Recent Labs   Lab 11/18/19  1121 01/06/20  1625 01/09/20  0648   WBC 12.04 11.83 11.22   Hemoglobin 10.9 L 10.7 L 10.4 L   Hematocrit 34.6 L 33.6 L 32.8 L   Platelets 206 256 210       CHEMISTRIES:  Recent Labs   Lab 03/31/19  0443 04/09/19  1237  05/09/19  1042  11/18/19  1121 01/06/20  1625 01/09/20  0648   Glucose 268 H 165 H   < > 267 H   < > 331 H 427 H 381 H   Sodium 137 138   < > 138   < > 137 137 135 L   Potassium 4.2 3.8   < > 4.0   < > 4.3 4.9 4.0   BUN, Bld 31 H 22   < > 22   < > 20 23 26 H   Creatinine 1.3 1.1   < > 1.2   < > 1.1 1.4 1.0   eGFR if African American 49 A 60   < > 54 A   < > 59 A 44 A >60   eGFR if non  42 A 52 A   < > 47 A   < > 51 A 38 A 58 A   Calcium 10.3 10.6 H   < > 9.8   < > 9.9 9.5 9.1   Magnesium 1.5 L 1.4 L  --  1.1 L  --   --   --   --     < > = values in this interval not displayed.       CARDIAC BIOMARKERS:  Recent Labs   Lab 11/17/19  2034 11/17/19  2253 11/18/19  0502   Troponin I 0.015 0.012 0.013       COAGS:  Recent Labs   Lab 04/09/19  1237 11/17/19  1407 11/18/19  1121   INR 1.0 1.0 1.0       LIPIDS/LFTS:  Recent Labs   Lab 12/05/17  1003  04/10/19  0524 05/09/19  1042 11/17/19  1407 11/18/19  0501   Cholesterol 201 H  --   --   --   --  85 L   Triglycerides 239 H  --   --   --   --  146   HDL 38 L  --   --   --   --  31 L   LDL Cholesterol 115.2  --   --   --   --  24.8 L   Non-HDL Cholesterol 163  --   --   --   --  54   AST   --    < > 29 38 38  --    ALT  --    < > 24 33 34  --     < > = values in this interval not displayed.       Hemoglobin A1C   Date Value Ref Range Status   11/17/2019 7.5 (H) 4.0 - 5.6 % Final     Comment:     ADA Screening Guidelines:  5.7-6.4%  Consistent with prediabetes  >or=6.5%  Consistent with diabetes  High levels of fetal hemoglobin interfere with the HbA1C  assay. Heterozygous hemoglobin variants (HbS, HgC, etc)do  not significantly interfere with this assay.   However, presence of multiple variants may affect accuracy.     03/28/2019 9.7 (H) 4.0 - 5.6 % Final     Comment:     ADA Screening Guidelines:  5.7-6.4%  Consistent with prediabetes  >or=6.5%  Consistent with diabetes  High levels of fetal hemoglobin interfere with the HbA1C  assay. Heterozygous hemoglobin variants (HbS, HgC, etc)do  not significantly interfere with this assay.   However, presence of multiple variants may affect accuracy.     11/26/2018 8.9 (H) 4.0 - 5.6 % Final     Comment:     ADA Screening Guidelines:  5.7-6.4%  Consistent with prediabetes  >or=6.5%  Consistent with diabetes  High levels of fetal hemoglobin interfere with the HbA1C  assay. Heterozygous hemoglobin variants (HbS, HgC, etc)do  not significantly interfere with this assay.   However, presence of multiple variants may affect accuracy.     11/26/2018 8.9 (H) 4.0 - 5.6 % Final     Comment:     ADA Screening Guidelines:  5.7-6.4%  Consistent with prediabetes  >or=6.5%  Consistent with diabetes  High levels of fetal hemoglobin interfere with the HbA1C  assay. Heterozygous hemoglobin variants (HbS, HgC, etc)do  not significantly interfere with this assay.   However, presence of multiple variants may affect accuracy.         TSH  Recent Labs   Lab 04/09/19  1237 05/09/19  1042 11/18/19  0501   TSH 0.371 L 0.626 0.733       The ASCVD Risk score (Forest Lakespepper RUEDA Jr., et al., 2013) failed to calculate for the following reasons:    The patient has a prior MI or stroke diagnosis              ASSESSMENT AND PLAN     Patient Active Problem List   Diagnosis    Myalgia and myositis, unspecified    Dysphagia    Anxiety    Hypertension associated with diabetes    History of non-Hodgkin's lymphoma    Palpitations    Fibromyalgia    Hyperlipidemia associated with type 2 diabetes mellitus    Leg edema, left    Cardiomegaly    Diabetic peripheral neuropathy associated with type 2 diabetes mellitus    Uncontrolled type 2 diabetes mellitus with diabetic polyneuropathy, with long-term current use of insulin    History of follicular lymphoma    Proliferative diabetic retinopathy of left eye associated with type 2 diabetes mellitus    Persistent microalbuminuria associated with type 2 diabetes mellitus    Acute inferior myocardial infarction    Coronary artery disease involving native coronary artery of native heart    Hypomagnesemia    Chest pain    CKD stage 3 secondary to diabetes    CAD (coronary artery disease)       1.  Coronary artery disease:  Patient with severe coronary artery disease.  Lifestyle limiting symptoms despite medical management.  Now status post revascularization of ramus and circumflex.  Chest pain-free now.    Continue aspirin and Brilinta.  Continue statins.      Hypertension:  Uncontrolled.  Has not been taking her Norvasc because ran out of Norvasc.  Norvasc refilled.    Follow-up with Dr. Rico in 3 months.              Thank you very much for involving me in the care of your patient.  Please do not hesitate to contact me if there are any questions.      Christos Monreal MD, FACC, [facility]  Interventional Cardiologist, [facility].           This note was dictated with the help of speech recognition software.  There might be un-intended errors and/or substitutions.

## 2020-01-23 ENCOUNTER — PATIENT OUTREACH (OUTPATIENT)
Dept: ADMINISTRATIVE | Facility: HOSPITAL | Age: 70
End: 2020-01-23

## 2020-02-06 ENCOUNTER — OFFICE VISIT (OUTPATIENT)
Dept: INTERNAL MEDICINE | Facility: CLINIC | Age: 70
End: 2020-02-06
Payer: MEDICARE

## 2020-02-06 VITALS
BODY MASS INDEX: 27.53 KG/M2 | DIASTOLIC BLOOD PRESSURE: 60 MMHG | WEIGHT: 185.88 LBS | HEIGHT: 69 IN | HEART RATE: 87 BPM | OXYGEN SATURATION: 99 % | SYSTOLIC BLOOD PRESSURE: 148 MMHG

## 2020-02-06 DIAGNOSIS — E11.59 HYPERTENSION ASSOCIATED WITH DIABETES: Chronic | ICD-10-CM

## 2020-02-06 DIAGNOSIS — E11.29 PERSISTENT MICROALBUMINURIA ASSOCIATED WITH TYPE 2 DIABETES MELLITUS: Chronic | ICD-10-CM

## 2020-02-06 DIAGNOSIS — L03.113 CELLULITIS OF RIGHT ARM: Primary | ICD-10-CM

## 2020-02-06 DIAGNOSIS — E11.3592 PROLIFERATIVE DIABETIC RETINOPATHY OF LEFT EYE ASSOCIATED WITH TYPE 2 DIABETES MELLITUS, UNSPECIFIED PROLIFERATIVE RETINOPATHY TYPE: Chronic | ICD-10-CM

## 2020-02-06 DIAGNOSIS — I25.2 HISTORY OF MYOCARDIAL INFARCTION: ICD-10-CM

## 2020-02-06 DIAGNOSIS — E11.22 CKD STAGE 3 SECONDARY TO DIABETES: ICD-10-CM

## 2020-02-06 DIAGNOSIS — E11.42 DIABETIC PERIPHERAL NEUROPATHY ASSOCIATED WITH TYPE 2 DIABETES MELLITUS: Chronic | ICD-10-CM

## 2020-02-06 DIAGNOSIS — N18.30 CKD STAGE 3 SECONDARY TO DIABETES: ICD-10-CM

## 2020-02-06 DIAGNOSIS — M65.30 TRIGGER FINGER, UNSPECIFIED FINGER, UNSPECIFIED LATERALITY: ICD-10-CM

## 2020-02-06 DIAGNOSIS — E11.69 HYPERLIPIDEMIA ASSOCIATED WITH TYPE 2 DIABETES MELLITUS: Chronic | ICD-10-CM

## 2020-02-06 DIAGNOSIS — I27.20 PULMONARY HYPERTENSION: ICD-10-CM

## 2020-02-06 DIAGNOSIS — L29.9 EAR ITCHING: ICD-10-CM

## 2020-02-06 DIAGNOSIS — R80.9 PERSISTENT MICROALBUMINURIA ASSOCIATED WITH TYPE 2 DIABETES MELLITUS: Chronic | ICD-10-CM

## 2020-02-06 DIAGNOSIS — E78.5 HYPERLIPIDEMIA ASSOCIATED WITH TYPE 2 DIABETES MELLITUS: Chronic | ICD-10-CM

## 2020-02-06 DIAGNOSIS — I15.2 HYPERTENSION ASSOCIATED WITH DIABETES: Chronic | ICD-10-CM

## 2020-02-06 PROBLEM — I21.19 ACUTE INFERIOR MYOCARDIAL INFARCTION: Chronic | Status: RESOLVED | Noted: 2019-03-29 | Resolved: 2020-02-06

## 2020-02-06 PROBLEM — I25.10 CAD (CORONARY ARTERY DISEASE): Status: RESOLVED | Noted: 2019-12-30 | Resolved: 2020-02-06

## 2020-02-06 PROBLEM — R07.9 CHEST PAIN: Status: RESOLVED | Noted: 2019-04-09 | Resolved: 2020-02-06

## 2020-02-06 PROCEDURE — 3077F SYST BP >= 140 MM HG: CPT | Mod: HCNC,CPTII,S$GLB, | Performed by: INTERNAL MEDICINE

## 2020-02-06 PROCEDURE — 1125F PR PAIN SEVERITY QUANTIFIED, PAIN PRESENT: ICD-10-PCS | Mod: HCNC,S$GLB,, | Performed by: INTERNAL MEDICINE

## 2020-02-06 PROCEDURE — 3077F PR MOST RECENT SYSTOLIC BLOOD PRESSURE >= 140 MM HG: ICD-10-PCS | Mod: HCNC,CPTII,S$GLB, | Performed by: INTERNAL MEDICINE

## 2020-02-06 PROCEDURE — 3288F FALL RISK ASSESSMENT DOCD: CPT | Mod: HCNC,CPTII,S$GLB, | Performed by: INTERNAL MEDICINE

## 2020-02-06 PROCEDURE — 99214 OFFICE O/P EST MOD 30 MIN: CPT | Mod: HCNC,S$GLB,, | Performed by: INTERNAL MEDICINE

## 2020-02-06 PROCEDURE — 1100F PR PT FALLS ASSESS DOC 2+ FALLS/FALL W/INJURY/YR: ICD-10-PCS | Mod: HCNC,CPTII,S$GLB, | Performed by: INTERNAL MEDICINE

## 2020-02-06 PROCEDURE — 3078F PR MOST RECENT DIASTOLIC BLOOD PRESSURE < 80 MM HG: ICD-10-PCS | Mod: HCNC,CPTII,S$GLB, | Performed by: INTERNAL MEDICINE

## 2020-02-06 PROCEDURE — 99999 PR PBB SHADOW E&M-EST. PATIENT-LVL IV: CPT | Mod: PBBFAC,HCNC,, | Performed by: INTERNAL MEDICINE

## 2020-02-06 PROCEDURE — 3288F PR FALLS RISK ASSESSMENT DOCUMENTED: ICD-10-PCS | Mod: HCNC,CPTII,S$GLB, | Performed by: INTERNAL MEDICINE

## 2020-02-06 PROCEDURE — 1159F PR MEDICATION LIST DOCUMENTED IN MEDICAL RECORD: ICD-10-PCS | Mod: HCNC,S$GLB,, | Performed by: INTERNAL MEDICINE

## 2020-02-06 PROCEDURE — 3078F DIAST BP <80 MM HG: CPT | Mod: HCNC,CPTII,S$GLB, | Performed by: INTERNAL MEDICINE

## 2020-02-06 PROCEDURE — 3051F PR MOST RECENT HEMOGLOBIN A1C LEVEL 7.0 - < 8.0%: ICD-10-PCS | Mod: HCNC,CPTII,S$GLB, | Performed by: INTERNAL MEDICINE

## 2020-02-06 PROCEDURE — 99499 UNLISTED E&M SERVICE: CPT | Mod: HCNC,S$GLB,, | Performed by: INTERNAL MEDICINE

## 2020-02-06 PROCEDURE — 1159F MED LIST DOCD IN RCRD: CPT | Mod: HCNC,S$GLB,, | Performed by: INTERNAL MEDICINE

## 2020-02-06 PROCEDURE — 1125F AMNT PAIN NOTED PAIN PRSNT: CPT | Mod: HCNC,S$GLB,, | Performed by: INTERNAL MEDICINE

## 2020-02-06 PROCEDURE — 99499 RISK ADDL DX/OHS AUDIT: ICD-10-PCS | Mod: HCNC,S$GLB,, | Performed by: INTERNAL MEDICINE

## 2020-02-06 PROCEDURE — 3051F HG A1C>EQUAL 7.0%<8.0%: CPT | Mod: HCNC,CPTII,S$GLB, | Performed by: INTERNAL MEDICINE

## 2020-02-06 PROCEDURE — 1100F PTFALLS ASSESS-DOCD GE2>/YR: CPT | Mod: HCNC,CPTII,S$GLB, | Performed by: INTERNAL MEDICINE

## 2020-02-06 PROCEDURE — 99214 PR OFFICE/OUTPT VISIT, EST, LEVL IV, 30-39 MIN: ICD-10-PCS | Mod: HCNC,S$GLB,, | Performed by: INTERNAL MEDICINE

## 2020-02-06 PROCEDURE — 99999 PR PBB SHADOW E&M-EST. PATIENT-LVL IV: ICD-10-PCS | Mod: PBBFAC,HCNC,, | Performed by: INTERNAL MEDICINE

## 2020-02-06 RX ORDER — CEPHALEXIN 500 MG/1
500 CAPSULE ORAL EVERY 8 HOURS
Qty: 21 CAPSULE | Refills: 0 | Status: SHIPPED | OUTPATIENT
Start: 2020-02-06 | End: 2020-02-13

## 2020-02-06 NOTE — PATIENT INSTRUCTIONS
Please use the triamcinolone cream to the rash on the arm two times a day. You can also use Benadryl cream as well.    For symptom relief of ear itching, please start daily over-the-counter antihistamines like Claritin, Zyrtec, or Allegra; the generic versions all work well, too. You can use over-the-counter ear itch sprays or drops as well.

## 2020-02-06 NOTE — PROGRESS NOTES
"Subjective:       Patient ID: Lorena Contreras is a 69 y.o. female.    Chief Complaint: Follow-up; Insect Bite (Insect bite two days ago on right forearm ); Hand Pain (Bilaterl hand pain); and Otalgia (Bilateral ear itching)      Last visit with me 12/10/2019.  Since then was seen by Cardiology.    HPI    2 d ago had feeling of bite on her arm, reports burning and itching. Tried over-the-counter creams without relief. Some pain as well.    Ears itching last few days. Tried peroxide and didn't work.     Developing trigger finger of both hands.     Review of Systems   All other systems reviewed and are negative.      Objective:      Physical Exam   HENT:   Right Ear: A middle ear effusion is present.   Left Ear: A middle ear effusion is present.   Mouth/Throat: Oropharynx is clear and moist. No oropharyngeal exudate.   Broken front tooth. Mild erythema bilateral external canals.   Neck: Normal range of motion. No thyromegaly present.   Cardiovascular: Normal rate, regular rhythm and normal heart sounds.   Pulses:       Radial pulses are 2+ on the right side.   Pulmonary/Chest: Effort normal and breath sounds normal. No stridor. She has no wheezes. She has no rales.   Musculoskeletal:   Trigger finger 3rd digit left hand. No tenderness in bilateral hands or fingers.   Lymphadenopathy:     She has no cervical adenopathy.   Skin: Skin is warm and dry. Rash noted. There is erythema (along volar aspect R forearm).   Nursing note and vitals reviewed.      Vitals:    02/06/20 1435 02/06/20 1457   BP: (!) 140/82 (!) 148/60   BP Location: Left arm Right arm   Patient Position: Sitting Sitting   BP Method: Medium (Manual) Medium (Manual)   Pulse: 87    SpO2: 99%    Weight: 84.3 kg (185 lb 13.6 oz)    Height: 5' 9" (1.753 m)      Body mass index is 27.44 kg/m².    I have personally checked the blood pressure and documented my findings.     RESULTS: Reviewed labs from last 12 months    Assessment:       1. Cellulitis of right " arm    2. Trigger finger, unspecified finger, unspecified laterality    3. Ear itching    4. Pulmonary hypertension -- echo 11/2019    5. History of myocardial infarction    6. Proliferative diabetic retinopathy of left eye associated with type 2 diabetes mellitus, unspecified proliferative retinopathy type    7. Hyperlipidemia associated with type 2 diabetes mellitus    8. Hypertension associated with diabetes    9. CKD stage 3 secondary to diabetes    10. Persistent microalbuminuria associated with type 2 diabetes mellitus    11. Uncontrolled type 2 diabetes mellitus with diabetic polyneuropathy, with long-term current use of insulin    12. Diabetic peripheral neuropathy associated with type 2 diabetes mellitus        Plan:   Lorena was seen today for follow-up, insect bite, hand pain and otalgia.    Diagnoses and all orders for this visit:    Cellulitis of right arm:  New problem, possibly related to insect bite, start Keflex.  -     cephALEXin (KEFLEX) 500 MG capsule; Take 1 capsule (500 mg total) by mouth every 8 (eight) hours. for 7 days    Trigger finger, unspecified finger, unspecified laterality:  New problem, symptoms are bothersome to patient, refer to Orthopedics.  -     Ambulatory referral/consult to Orthopedics; Future    Ear itching:  New problem, start treatment with oral antihistamine and over-the-counter topical agents.     Pulmonary hypertension -- echo 11/2019:  Prior diagnosis, asymptomatic, will continue to try to control patient's blood pressure. May require sleep study in future for evaluation of obstructive sleep apnea.    History of myocardial infarction    Proliferative diabetic retinopathy of left eye associated with type 2 diabetes mellitus, unspecified proliferative retinopathy type:  Prior diagnosis, diabetes is better controlled. continue follow up with Optometry.    Hyperlipidemia associated with type 2 diabetes mellitus:  Prior diagnosis, stable, well controlled on current  management. No changes at this time, will continue to monitor.     Hypertension associated with diabetes:  Prior diagnosis, not well controlled today, possibly due to cellulitis. continue to monitor.    CKD stage 3 secondary to diabetes:  Prior diagnosis, stable. Continue control of hypertension and diabetes.    Persistent microalbuminuria associated with type 2 diabetes mellitus:  Prior diagnosis, not on ARB, discuss at next visit and start low-dose.    Uncontrolled type 2 diabetes mellitus with diabetic polyneuropathy, with long-term current use of insulin:  Prior diagnosis, improving, continue insulin and oral meds.    Diabetic peripheral neuropathy associated with type 2 diabetes mellitus:  Prior diagnosis, continue to control glucose, if symptoms worsen please notify the office.    Follow up in about 6 months (around 8/6/2020) for follow up visit.  Kenneth Luu MD  Internal Medicine    Portions of this note were completed using medical dictation software. Please excuse typographical or syntax errors that were missed on review.

## 2020-02-07 DIAGNOSIS — E11.42 DIABETIC PERIPHERAL NEUROPATHY ASSOCIATED WITH TYPE 2 DIABETES MELLITUS: Chronic | ICD-10-CM

## 2020-02-07 DIAGNOSIS — F41.9 ANXIETY: ICD-10-CM

## 2020-02-10 ENCOUNTER — TELEPHONE (OUTPATIENT)
Dept: ORTHOPEDICS | Facility: CLINIC | Age: 70
End: 2020-02-10

## 2020-02-10 RX ORDER — PEN NEEDLE, DIABETIC 30 GX3/16"
NEEDLE, DISPOSABLE MISCELLANEOUS
Qty: 400 EACH | Refills: 3 | Status: SHIPPED | OUTPATIENT
Start: 2020-02-10 | End: 2020-06-10 | Stop reason: SDUPTHER

## 2020-02-10 RX ORDER — FLUOXETINE HYDROCHLORIDE 20 MG/1
CAPSULE ORAL
Qty: 90 CAPSULE | Refills: 1 | Status: SHIPPED | OUTPATIENT
Start: 2020-02-10 | End: 2020-06-10 | Stop reason: SDUPTHER

## 2020-02-10 NOTE — PROGRESS NOTES
"Refill Authorization Note     is requesting a refill authorization.    Brief assessment and rationale for refill: APPROVE: prr                                         Comments:     Requested Prescriptions   Signed Prescriptions Disp Refills    pen needle, diabetic (BD ULTRA-FINE SAGAR PEN NEEDLE) 32 gauge x 5/32" Ndle 400 each 3     Sig: USE WITH NOVOLOG MIX FLEXPENS       Endocrinology: Diabetes - Supplies Passed - 2/7/2020  2:39 PM        Passed - Patient is at least 18 years old        Passed - Office visit in past 12 months or future 90 days.     Recent Outpatient Visits            4 days ago Cellulitis of right arm    [facility] - Internal Medicine Kenneth Luu MD    3 weeks ago Coronary artery disease, angina presence unspecified, unspecified vessel or lesion type, unspecified whether native or transplanted heart    [facility] Christos Monreal MD    1 month ago Coronary artery disease of native artery of native heart with stable angina pectoris    [facility] Christos Monreal MD    2 months ago Generalized anxiety disorder    [facility] Medicine Kenneth Luu MD    2 months ago Acute inferior myocardial infarction    [facility] Karl Rico MD          Future Appointments              In 4 days Shelly Domínguez PA-C [facility] [place] [address], [facility]    In 5 months Kenneth Luu MD [facility], [facility]                Passed - HBA1C is 7.9 or below and within 180 days     Hemoglobin A1C   Date Value Ref Range Status   11/17/2019 7.5 (H) 4.0 - 5.6 % Final     Comment:     ADA Screening Guidelines:  5.7-6.4%  Consistent with prediabetes  >or=6.5%  Consistent with diabetes  High levels of fetal hemoglobin interfere with the HbA1C  assay. Heterozygous hemoglobin variants (HbS, HgC, etc)do  not significantly interfere with this assay.   However, presence of multiple variants may affect accuracy.     03/28/2019 9.7 (H) 4.0 " - 5.6 % Final     Comment:     ADA Screening Guidelines:  5.7-6.4%  Consistent with prediabetes  >or=6.5%  Consistent with diabetes  High levels of fetal hemoglobin interfere with the HbA1C  assay. Heterozygous hemoglobin variants (HbS, HgC, etc)do  not significantly interfere with this assay.   However, presence of multiple variants may affect accuracy.     11/26/2018 8.9 (H) 4.0 - 5.6 % Final     Comment:     ADA Screening Guidelines:  5.7-6.4%  Consistent with prediabetes  >or=6.5%  Consistent with diabetes  High levels of fetal hemoglobin interfere with the HbA1C  assay. Heterozygous hemoglobin variants (HbS, HgC, etc)do  not significantly interfere with this assay.   However, presence of multiple variants may affect accuracy.     11/26/2018 8.9 (H) 4.0 - 5.6 % Final     Comment:     ADA Screening Guidelines:  5.7-6.4%  Consistent with prediabetes  >or=6.5%  Consistent with diabetes  High levels of fetal hemoglobin interfere with the HbA1C  assay. Heterozygous hemoglobin variants (HbS, HgC, etc)do  not significantly interfere with this assay.   However, presence of multiple variants may affect accuracy.               FLUoxetine 20 MG capsule 90 capsule 1     Sig: TAKE 1 CAPSULE BY MOUTH ONCE DAILY       Psychiatry:  Antidepressants - SSRI Passed - 2/7/2020  2:39 PM        Passed - Patient is at least 18 years old        Passed - Office visit in past 6 months or future 90 days.     Recent Outpatient Visits            4 days ago Cellulitis of right arm    [facility] - Internal Medicine Kenneth Luu MD    3 weeks ago Coronary artery disease, angina presence unspecified, unspecified vessel or lesion type, unspecified whether native or transplanted heart    [facility] Christos Monreal MD    1 month ago Coronary artery disease of native artery of native heart with stable angina pectoris    [facility] Christos Monreal MD    2 months ago Generalized anxiety disorder    [facility] - Internal Medicine  Kenneth Luu MD    2 months ago Acute inferior myocardial infarction    [facility] - Cardiology Karl Rico MD          Future Appointments              In 4 days Shelly Domínguez PA-C [facility] [address], [facility]    In 5 months MD [facility] - Internal Medicine, [facility]

## 2020-02-10 NOTE — TELEPHONE ENCOUNTER
VM LEFT FOR PATIENT IN REGARDS TO THE LATERALITY OF HER TRIGGER FINGER SO XRAYS CAN BE ORDERED BEFORE HER APPOINTMENT.    TOLU NAYAK LPN

## 2020-02-11 ENCOUNTER — TELEPHONE (OUTPATIENT)
Dept: ORTHOPEDICS | Facility: CLINIC | Age: 70
End: 2020-02-11

## 2020-02-11 NOTE — TELEPHONE ENCOUNTER
Second voicemail left for patient in regards to the laterality and finger she is having the problem with so that xrays can be ordered before her appointment.  Left name and phone number in the message.    Julia Norris LPN

## 2020-02-12 ENCOUNTER — PATIENT OUTREACH (OUTPATIENT)
Dept: ADMINISTRATIVE | Facility: OTHER | Age: 70
End: 2020-02-12

## 2020-02-12 ENCOUNTER — TELEPHONE (OUTPATIENT)
Dept: ORTHOPEDICS | Facility: CLINIC | Age: 70
End: 2020-02-12

## 2020-02-12 DIAGNOSIS — Z12.31 BREAST CANCER SCREENING BY MAMMOGRAM: Primary | ICD-10-CM

## 2020-02-12 NOTE — TELEPHONE ENCOUNTER
Third voicemail left for patient in regards to the laterality and finger she is having the problem with so that xrays can be ordered before her appointment.  Left name and phone number in the message.     Julia Norris LPN

## 2020-02-13 NOTE — PROGRESS NOTES
Chart reviewed.   Immunizations: Triggered Imm Registry     Orders placed: Mammo w/CAD  Upcoming appts to satisfy MARTIN topics: n/a

## 2020-03-03 ENCOUNTER — NURSE TRIAGE (OUTPATIENT)
Dept: ADMINISTRATIVE | Facility: CLINIC | Age: 70
End: 2020-03-03

## 2020-03-03 ENCOUNTER — HOSPITAL ENCOUNTER (EMERGENCY)
Facility: HOSPITAL | Age: 70
Discharge: HOME | End: 2020-03-03
Attending: EMERGENCY MEDICINE
Payer: MEDICARE

## 2020-03-03 VITALS
RESPIRATION RATE: 20 BRPM | HEIGHT: 70 IN | DIASTOLIC BLOOD PRESSURE: 68 MMHG | HEART RATE: 90 BPM | SYSTOLIC BLOOD PRESSURE: 156 MMHG | BODY MASS INDEX: 26.05 KG/M2 | TEMPERATURE: 99 F | WEIGHT: 182 LBS | OXYGEN SATURATION: 100 %

## 2020-03-03 DIAGNOSIS — E11.65 UNCONTROLLED TYPE 2 DIABETES MELLITUS WITH HYPERGLYCEMIA, WITH LONG-TERM CURRENT USE OF INSULIN: ICD-10-CM

## 2020-03-03 DIAGNOSIS — T50.901A MEDICATION OVERDOSE: Primary | ICD-10-CM

## 2020-03-03 DIAGNOSIS — Z79.4 UNCONTROLLED TYPE 2 DIABETES MELLITUS WITH HYPERGLYCEMIA, WITH LONG-TERM CURRENT USE OF INSULIN: ICD-10-CM

## 2020-03-03 DIAGNOSIS — E11.59 HYPERTENSION ASSOCIATED WITH DIABETES: ICD-10-CM

## 2020-03-03 DIAGNOSIS — I15.2 HYPERTENSION ASSOCIATED WITH DIABETES: ICD-10-CM

## 2020-03-03 DIAGNOSIS — T50.901A ACCIDENTAL OVERDOSE, INITIAL ENCOUNTER: ICD-10-CM

## 2020-03-03 PROCEDURE — 93005 ELECTROCARDIOGRAM TRACING: CPT | Mod: HCNC

## 2020-03-03 PROCEDURE — 93010 EKG 12-LEAD: ICD-10-PCS | Mod: HCNC,,, | Performed by: INTERNAL MEDICINE

## 2020-03-03 PROCEDURE — 99283 EMERGENCY DEPT VISIT LOW MDM: CPT | Mod: 25,HCNC

## 2020-03-03 PROCEDURE — 93010 ELECTROCARDIOGRAM REPORT: CPT | Mod: HCNC,,, | Performed by: INTERNAL MEDICINE

## 2020-03-04 NOTE — ED TRIAGE NOTES
"Patient reports that she took an extra dose of all of her daily meds (including BP med Tenormin 50mg,&HCTZ 25mg), "I took my Wedndesday meds forgetting I took my Tues meds. two meds are for her BP. Pt is also on anticoagulants and blood sugar meds. She states she took them at 8pm tonight. Pt reports being panicked that she accidentally took more than what is prescribed.  Denies symptoms at this time. Denies HA, dizziness or n/v.  "

## 2020-03-04 NOTE — ED PROVIDER NOTES
"Encounter Date: 3/3/2020       History     Chief Complaint   Patient presents with    Drug Overdose     patient reports that she took an extra dose of her daily meds (including BP), "I took my Wedndesday meds forgetting I took my Tues meds. two meds are for her BP .she states she took them at 2000. denies symptoms at thsi time,. " also ate a large bowl of crawfish maybe thats keeping my BP up"     70 yo female with HTN, DLD, DM2, presents via personal transportation s/p accidentally taking an extra dose of her morning meds today. Patient keeps her meds in a pill box with morning and evening compartments. She took her AM meds this morning (Tuesday 3/3/20), then this afternoon went to the box, thought it was Wednesday, and took those meds as well before realizing her mistake. She called a nurse line and was told to come to ED. No symptoms. No chest pain, shortness of breath, lightheadedness, nausea/vomiting.     Morning meds:  ASA 81mg  Atenolol 50mg  Brilinta 90mg  Imdur 30mg  Protonix 40mg  HCTZ 25mg    Evening meds:  Metformin 1000mg  Atorvastatin 80mg  Atenolol 50mg  Amlodipine 10mg    PMH: MI, CAD, HTN, DLD, DM2, restless leg syndrome, colon polyps, follicular lymphoma, fibromyalgia    Psych: anxiety, depression    PSH: L heart cath, bilateral knee surgery, R elbow surgery, EGD/colonoscopy        Review of patient's allergies indicates:   Allergen Reactions    Novolin 70/30 (semi-synthetic) Nausea And Vomiting     Severe vomiting on Relion 70/30    Shellfish containing products Swelling    Sulfa (sulfonamide antibiotics) Anaphylaxis    Victoza [liraglutide] Nausea And Vomiting    Glipizide Nausea Only    Citric acid     Codeine     Egg derived     Iodine and iodide containing products     Rituxan [rituximab]     Talwin [pentazocine lactate]     Zoloft [sertraline]      Past Medical History:   Diagnosis Date    Anxiety     Colon polyps     Coronary artery disease     Depression     Diabetes " mellitus, type II     Fibromyalgia     Follicular lymphoma     HTN (hypertension)     MI (myocardial infarction) 03/2019    Restless leg syndrome      Past Surgical History:   Procedure Laterality Date    COLONOSCOPY  11/07/2012    Colon polyp found; repeat in 5 years    ELBOW SURGERY Right     dislocation repair     ESOPHAGOGASTRODUODENOSCOPY  11/07/2012    atrophic gastritis, H pylori testing negative    KNEE SURGERY Bilateral     scoped    LEFT HEART CATHETERIZATION Left 3/29/2019    Procedure: Left heart cath;  Surgeon: Bladimir Barbosa MD;  Location: [facility] CATH LAB;  Service: Cardiology;  Laterality: Left;    LEFT HEART CATHETERIZATION Left 11/18/2019    Procedure: Left heart cath;  Surgeon: Karl Rico MD;  Location: [facility] CATH LAB;  Service: Cardiology;  Laterality: Left;    LEFT HEART CATHETERIZATION Left 1/8/2020    Procedure: Left heart cath, right radial, noon start;  Surgeon: Christos Monreal MD;  Location: [facility] CATH LAB;  Service: Cardiology;  Laterality: Left;  RN Pre Op 1-6-20.  To be admitted 1-7-20 sor Aspirin Disensitation    ULTRASOUND GUIDANCE  1/8/2020    Procedure: Ultrasound Guidance;  Surgeon: Christos Monreal MD;  Location: [facility] CATH LAB;  Service: Cardiology;;     Family History   Problem Relation Age of Onset    Cancer Mother     Heart disease Mother     Cancer Father         lung    Diabetes Sister     Hypertension Sister     Stroke Neg Hx     Hyperlipidemia Neg Hx      Social History     Tobacco Use    Smoking status: Never Smoker    Smokeless tobacco: Never Used   Substance Use Topics    Alcohol use: No    Drug use: Never     Review of Systems   Constitutional: Negative for fever.   HENT: Negative for sore throat.    Eyes: Negative for photophobia.   Respiratory: Negative for shortness of breath.    Cardiovascular: Negative for chest pain.   Gastrointestinal: Negative for abdominal pain.   Genitourinary: Negative for dysuria.   Musculoskeletal: Negative for  neck pain.   Skin: Negative for rash.   Neurological: Negative for dizziness and light-headedness.       Physical Exam     Initial Vitals [03/03/20 2055]   BP Pulse Resp Temp SpO2   (!) 165/72 90 15 98.7 °F (37.1 °C) 99 %      MAP       --         Physical Exam    Nursing note and vitals reviewed.  Constitutional: She appears well-developed and well-nourished. She is not diaphoretic.   Awake, alert, nontoxic, speaking in complete sentences.   HENT:   Head: Normocephalic and atraumatic.   Mouth/Throat: Oropharynx is clear and moist.   Eyes: Conjunctivae and EOM are normal. Pupils are equal, round, and reactive to light.   Neck: Normal range of motion. Neck supple.   Cardiovascular: Normal rate, regular rhythm, normal heart sounds and intact distal pulses.   No murmur heard.  Pulmonary/Chest: Breath sounds normal. No respiratory distress. She has no wheezes. She has no rhonchi. She has no rales.   Abdominal: Soft. There is no tenderness.   Musculoskeletal: Normal range of motion. She exhibits edema (trace bilat LE). She exhibits no tenderness.   Neurological: She is alert and oriented to person, place, and time. She has normal strength.   Moving all extremities.   Skin: Skin is warm and dry. No erythema. No pallor.   Psychiatric: She has a normal mood and affect.         ED Course   Procedures  Labs Reviewed - No data to display  EKG Readings: (Independently Interpreted)   21:06: NSR, HR 90. Normal axis. Q waves in III, aVF, V6. TWI in III. No STEMI. Morphology c/w 1/8/20.      ECG Results          EKG 12-lead (In process)  Result time 03/04/20 06:11:54    In process by Interface, Lab In [facility] (03/04/20 06:11:54)                 Narrative:    Test Reason : T50.901A,    Vent. Rate : 090 BPM     Atrial Rate : 090 BPM     P-R Int : 134 ms          QRS Dur : 074 ms      QT Int : 372 ms       P-R-T Axes : 033 008 021 degrees     QTc Int : 455 ms    Normal sinus rhythm  Possible Lateral infarct ,age undetermined  Possible  Inferior infarct (cited on or before 08-JAN-2020)  Abnormal ECG  When compared with ECG of 08-JAN-2020 18:05,  No significant change was found    Referred By: [email or web address]   SELF           Confirmed By:                   In process by Interface, Lab In [facility] (03/04/20 06:07:58)                 Narrative:    Test Reason : T50.901A,    Vent. Rate : 090 BPM     Atrial Rate : 090 BPM     P-R Int : 134 ms          QRS Dur : 074 ms      QT Int : 372 ms       P-R-T Axes : 033 008 021 degrees     QTc Int : 455 ms    Normal sinus rhythm  Possible Lateral infarct ,age undetermined  Possible Inferior infarct (cited on or before 08-JAN-2020)  Abnormal ECG  When compared with ECG of 08-JAN-2020 18:05,  No significant change was found    Referred By: AAAREFERR   SELF           Confirmed By:                             Imaging Results    None          Medical Decision Making:   History:   Old Medical Records: I decided to obtain old medical records.  Old Records Summarized: records from previous admission(s), records from another hospital and records from clinic visits.  Initial Assessment:   70 yo female with accidental extra dose of her morning meds, taken this afternoon.   Differential Diagnosis:   Ddx includes toxic overdose, hypotension, KYA, other.  ED Management:  Patient monitored in ED without worrisome drop in BP.    Patient is within therapeutic range for most meds even with extra dose. (We give Brilinta 180mg for STEMI.)    I instructed patient to skip BP meds tonight but to take her regular metformin and statin. She will resume regular medication dosing tomorrow.    D/c'ed in NAD.                                  Clinical Impression:       ICD-10-CM ICD-9-CM   1. Medication overdose T50.901A 977.9     E858.8   2. Accidental overdose, initial encounter T50.901A 977.9     E858.9             ED Disposition Condition    Discharge Stable        ED Prescriptions     None        Follow-up Information     Follow up With  Specialties Details Why Contact Info    Kenneth Luu MD Internal Medicine  As needed [address]  [place] [address]  [phone]                                       Joan Malin MD  03/04/20 0989

## 2020-03-04 NOTE — DISCHARGE INSTRUCTIONS
Skip your blood pressure medications tonight, but take your Metformin and Atorvastatin.     Resume your regular medication dosing tomorrow.

## 2020-03-04 NOTE — TELEPHONE ENCOUNTER
Spoke with patient she stated that unintentionally took her morning medication twice.  Patient stated that when she got home she thought that today was Wednesday and she took her medication again.  Advised patient to go to the ED now. Patient instructed to call 911 as she stated she was unable to drive herself. Patient verbalized understanding. Patient also given number to poison control line [phone].   Reason for Disposition   [1] Poison Center advised caller to go to ED AND [2] caller seeking second opinion    Additional Information   Negative: Severe difficulty breathing (e.g., struggling for each breath, speaks in single words)   Negative: Bluish (or gray) lips or face now   Negative: Seizure   Negative: Difficult to awaken or acting confused (e.g., disoriented, slurred speech)   Negative: Shock suspected (e.g., cold/pale/clammy skin, too weak to stand, low BP, rapid pulse)   Negative: [1] Intentional overdose AND [2] suicidal thoughts or ideas   Negative: Suicide attempt, known or suspected   Negative: Sounds like a life-threatening emergency to the triager    Protocols used: POISONING-A-

## 2020-03-05 NOTE — PROGRESS NOTES
Refill Authorization Note     is requesting a refill authorization.    Brief assessment and rationale for refill: REVIEW: recent ED visit/DDI     Medication-related problems identified: Drug-drug interaction    Medication Therapy Plan: Recent ED visit 03/20(medication overdose); FOVS(08/20); DDI(nexium and protonix), review    Medication reconciliation completed: No   Pharmacist Review Requested: Yes                     Comments:   Requested Prescriptions   Pending Prescriptions Disp Refills    metFORMIN (GLUCOPHAGE) 1000 MG tablet [Pharmacy Med Name: metFORMIN HCl 1000 MG Oral Tablet] 180 tablet 0     Sig: TAKE 1 TABLET BY MOUTH TWICE DAILY WITH MEALS       Endocrinology:  Diabetes - Biguanides Passed - 3/5/2020 11:25 AM        Passed - Patient is at least 18 years old        Passed - Office visit in past 12 months or future 90 days.     Recent Outpatient Visits            4 weeks ago Cellulitis of right arm    [facility] Internal Medicine Kenneth Luu MD    1 month ago Coronary artery disease, angina presence unspecified, unspecified vessel or lesion type, unspecified whether native or transplanted heart    [facility] Christos Monreal MD    2 months ago Coronary artery disease of native artery of native heart with stable angina pectoris    [facility] Christos Monreal MD    2 months ago Generalized anxiety disorder    [facility] Internal Medicine Kenneth Luu MD    2 months ago Acute inferior myocardial infarction    [facility] Karl Rico MD          Future Appointments              In 5 months Kenneth Luu MD [facility] Internal Medicine, [facility]                Passed - Cr is 1.3 or below and within 360 days     Creatinine   Date Value Ref Range Status   01/09/2020 1.0 0.5 - 1.4 mg/dL Final   01/06/2020 1.4 0.5 - 1.4 mg/dL Final   11/18/2019 1.1 0.5 - 1.4 mg/dL Final     POC Creatinine   Date Value Ref Range Status   07/29/2018 0.9 0.6 - 1.2 mg/dL  Final              Passed - HBA1C is 7.9 or below and within 180 days     Hemoglobin A1C   Date Value Ref Range Status   11/17/2019 7.5 (H) 4.0 - 5.6 % Final     Comment:     ADA Screening Guidelines:  5.7-6.4%  Consistent with prediabetes  >or=6.5%  Consistent with diabetes  High levels of fetal hemoglobin interfere with the HbA1C  assay. Heterozygous hemoglobin variants (HbS, HgC, etc)do  not significantly interfere with this assay.   However, presence of multiple variants may affect accuracy.     03/28/2019 9.7 (H) 4.0 - 5.6 % Final     Comment:     ADA Screening Guidelines:  5.7-6.4%  Consistent with prediabetes  >or=6.5%  Consistent with diabetes  High levels of fetal hemoglobin interfere with the HbA1C  assay. Heterozygous hemoglobin variants (HbS, HgC, etc)do  not significantly interfere with this assay.   However, presence of multiple variants may affect accuracy.     11/26/2018 8.9 (H) 4.0 - 5.6 % Final     Comment:     ADA Screening Guidelines:  5.7-6.4%  Consistent with prediabetes  >or=6.5%  Consistent with diabetes  High levels of fetal hemoglobin interfere with the HbA1C  assay. Heterozygous hemoglobin variants (HbS, HgC, etc)do  not significantly interfere with this assay.   However, presence of multiple variants may affect accuracy.     11/26/2018 8.9 (H) 4.0 - 5.6 % Final     Comment:     ADA Screening Guidelines:  5.7-6.4%  Consistent with prediabetes  >or=6.5%  Consistent with diabetes  High levels of fetal hemoglobin interfere with the HbA1C  assay. Heterozygous hemoglobin variants (HbS, HgC, etc)do  not significantly interfere with this assay.   However, presence of multiple variants may affect accuracy.                Passed - eGFR is 30 or above and within 360 days     eGFR if non    Date Value Ref Range Status   01/09/2020 58 (A) >60 mL/min/1.73 m^2 Final     Comment:     Calculation used to obtain the estimated glomerular filtration  rate (eGFR) is the CKD-EPI equation.       01/06/2020 38 (A) >60 mL/min/1.73 m^2 Final     Comment:     Calculation used to obtain the estimated glomerular filtration  rate (eGFR) is the CKD-EPI equation.      11/18/2019 51 (A) >60 mL/min/1.73 m^2 Final     Comment:     Calculation used to obtain the estimated glomerular filtration  rate (eGFR) is the CKD-EPI equation.        eGFR if    Date Value Ref Range Status   01/09/2020 >60 >60 mL/min/1.73 m^2 Final   01/06/2020 44 (A) >60 mL/min/1.73 m^2 Final   11/18/2019 59 (A) >60 mL/min/1.73 m^2 Final               Appointments  past 12m or future 3m with PCP    Date Provider   Last Visit   2/6/2020 Kenneth Luu MD   Next Visit   8/6/2020 Kenneth Luu MD   .  ED visits in past 90 days: 2       Note composed:11:30 AM 03/05/2020

## 2020-03-05 NOTE — PROGRESS NOTES
Refill Authorization Note     is requesting a refill authorization.    Brief assessment and rationale for refill: REVIEW: Recent ED Visit/DDI     Medication-related problems identified:   Drug-drug interaction  Requires appointment    Medication Therapy Plan: Recent ED visit (Medication overdose); FOVS 8/20; DDI (protonix/nexium); needs ED f/u; please review    Medication reconciliation completed: No   Pharmacist Review Requested: Yes                     Comments:   Requested Prescriptions   Pending Prescriptions Disp Refills    hydroCHLOROthiazide (HYDRODIURIL) 25 MG tablet [Pharmacy Med Name: hydroCHLOROthiazide 25 MG Oral Tablet] 90 tablet 0     Sig: Take 1 tablet by mouth once daily       Cardiovascular: Diuretics - Thiazide Failed - 3/3/2020  3:36 PM        Failed - Last BP in normal range within 360 days.     BP Readings from Last 3 Encounters:   03/03/20 (!) 156/68   02/06/20 (!) 148/60   01/16/20 (!) 183/77              Passed - Patient is at least 18 years old        Passed - Office visit in past 12 months or future 90 days.     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 pectoris    [facility] Christos Monreal MD    2 months ago Generalized anxiety disorder    [facility] Internal Medicine Kenneth Luu MD    2 months ago Acute inferior myocardial infarction    [facility] Karl Rico MD          Future Appointments              In 5 months MD David Serra [facility] Internal Medicine, David miki PCW                Passed - Ca in normal range and within 360 days     Calcium   Date Value Ref Range Status    01/09/2020 9.1 8.7 - 10.5 mg/dL Final   01/06/2020 9.5 8.7 - 10.5 mg/dL Final   11/18/2019 9.9 8.7 - 10.5 mg/dL Final     Calcium, POC   Date Value Ref Range Status   07/29/2018 9.5 8.0 - 10.3 mg/dL Final              Passed - Cr is 1.3 or below and within 180 days     Creatinine   Date Value Ref Range Status   01/09/2020 1.0 0.5 - 1.4 mg/dL Final   01/06/2020 1.4 0.5 - 1.4 mg/dL Final   11/18/2019 1.1 0.5 - 1.4 mg/dL Final     POC Creatinine   Date Value Ref Range Status   07/29/2018 0.9 0.6 - 1.2 mg/dL Final              Passed - K in normal range and within 180 days     POC Potassium   Date Value Ref Range Status   07/29/2018 3.9 3.6 - 5.1 mmol/L Final     Potassium   Date Value Ref Range Status   01/09/2020 4.0 3.5 - 5.1 mmol/L Final   01/06/2020 4.9 3.5 - 5.1 mmol/L Final   11/18/2019 4.3 3.5 - 5.1 mmol/L Final              Passed - Na is between 130 and 148 and within 180 days     POC Sodium   Date Value Ref Range Status   07/29/2018 138 128 - 145 mmol/L Final     Sodium   Date Value Ref Range Status   01/09/2020 135 (L) 136 - 145 mmol/L Final   01/06/2020 137 136 - 145 mmol/L Final   11/18/2019 137 136 - 145 mmol/L Final              Passed - eGFR within 180 days     eGFR if non    Date Value Ref Range Status   01/09/2020 58 (A) >60 mL/min/1.73 m^2 Final     Comment:     Calculation used to obtain the estimated glomerular filtration  rate (eGFR) is the CKD-EPI equation.      01/06/2020 38 (A) >60 mL/min/1.73 m^2 Final     Comment:     Calculation used to obtain the estimated glomerular filtration  rate (eGFR) is the CKD-EPI equation.      11/18/2019 51 (A) >60 mL/min/1.73 m^2 Final     Comment:     Calculation used to obtain the estimated glomerular filtration  rate (eGFR) is the CKD-EPI equation.        eGFR if    Date Value Ref Range Status   01/09/2020 >60 >60 mL/min/1.73 m^2 Final   01/06/2020 44 (A) >60 mL/min/1.73 m^2 Final   11/18/2019 59 (A) >60 mL/min/1.73 m^2 Final                Appointments  past 12m or future 3m with PCP    Date Provider   Last Visit   2/6/2020 Kenneth Luu MD   Next Visit   8/6/2020 Kenneth Luu MD   .  ED visits in past 90 days: 2       Note composed:11:46 AM 03/05/2020

## 2020-03-06 RX ORDER — METFORMIN HYDROCHLORIDE 1000 MG/1
TABLET ORAL
Qty: 180 TABLET | Refills: 0 | Status: SHIPPED | OUTPATIENT
Start: 2020-03-06 | End: 2020-05-28

## 2020-03-06 RX ORDER — HYDROCHLOROTHIAZIDE 25 MG/1
TABLET ORAL
Qty: 90 TABLET | Refills: 0 | Status: SHIPPED | OUTPATIENT
Start: 2020-03-06 | End: 2020-05-28

## 2020-03-06 NOTE — TELEPHONE ENCOUNTER
Please see the following assessment. This refill request still requires some action on your part. Pt recently visited ED for accidental medication overdose on 3/4. Was discharged home without substantial BP drop. Defer to you. Will follow up with your staff to schedule follow up appointment after your decision.    Thank you.

## 2020-03-06 NOTE — TELEPHONE ENCOUNTER
Please see the following assessment. This refill request still requires some action on your part.    Pt has not seen PCP since last hospital encounter (ED on 3/3/2020). [facility] protocol to renew medications until seen by PCP. Medications pended for your review. Defer request to you. Will f/u scheduling appointment following your decision.    Thank you!

## 2020-03-25 RX ORDER — PANTOPRAZOLE SODIUM 40 MG/1
40 TABLET, DELAYED RELEASE ORAL DAILY
Qty: 90 TABLET | Refills: 3 | Status: SHIPPED | OUTPATIENT
Start: 2020-03-25 | End: 2021-02-11 | Stop reason: SDUPTHER

## 2020-03-25 RX ORDER — ATORVASTATIN CALCIUM 80 MG/1
80 TABLET, FILM COATED ORAL NIGHTLY
Qty: 90 TABLET | Refills: 3 | Status: SHIPPED | OUTPATIENT
Start: 2020-03-25 | End: 2020-06-29 | Stop reason: SDUPTHER

## 2020-03-25 RX ORDER — ATENOLOL 50 MG/1
50 TABLET ORAL 2 TIMES DAILY
Qty: 180 TABLET | Refills: 3 | Status: SHIPPED | OUTPATIENT
Start: 2020-03-25 | End: 2020-06-29 | Stop reason: SDUPTHER

## 2020-03-25 NOTE — TELEPHONE ENCOUNTER
----- Message from Blanca ERWIN Edwards sent at 3/25/2020  1:19 PM CDT -----  Contact: Pt self Mobile/Home [phone]    Requesting an RX refill or new RX.  Is this a refill or new RX:  Refill  RX name and strength: atorvastatin (LIPITOR) 80 MG tablet  Directions (copy/paste from chart):  N/A  Is this a 30 day or 90 day RX:  90  Local pharmacy or mail order pharmacy:  [address]  Pharmacy name and phone # [phone]   Fax# [phone]     Requesting an RX refill or new RX.  Is this a refill or new RX:    RX name and strength: atenolol (TENORMIN) 50 MG tablet  Directions (copy/paste from chart):  N/A  Is this a 30 day or 90 day RX:  90  Local pharmacy or mail order pharmacy:  [address]  Pharmacy name and phone # [phone]   Fax# [phone]     Requesting an RX refill or new RX.  Is this a refill or new RX:  Refill  RX name and strength: pantoprazole (PROTONIX) 40 MG tablet  Directions (copy/paste from chart):  N/A  Is this a 30 day or 90 day RX:  90  Local pharmacy or mail order pharmacy:  [address]  Pharmacy name and phone # [phone]   Fax# [phone]     Requesting an RX refill or new RX.  Is this a refill or new RX:  Refill  RX name and strength: JANUVIA 100 mg Tab  Directions (copy/paste from chart):  N/A  Is this a 30 day or 90 day RX:  90  Local pharmacy or mail order pharmacy:  [address] [place] [address]  Pharmacy name and phone # [phone]   Fax# [phone]     Requesting an RX refill or new RX.  Is this a refill or new RX:  Refill  RX name and strength: ticagrelor (BRILINTA) 90 mg tablet  Directions (copy/paste from chart):  N/A  Is this a 30 day or 90 day RX:  90  Local pharmacy or mail order pharmacy:  [facility], [address]  Pharmacy name  and phone # [phone]  Fax# [phone]

## 2020-05-27 DIAGNOSIS — I15.2 HYPERTENSION ASSOCIATED WITH DIABETES: ICD-10-CM

## 2020-05-27 DIAGNOSIS — E11.65 UNCONTROLLED TYPE 2 DIABETES MELLITUS WITH HYPERGLYCEMIA, WITH LONG-TERM CURRENT USE OF INSULIN: ICD-10-CM

## 2020-05-27 DIAGNOSIS — E11.59 HYPERTENSION ASSOCIATED WITH DIABETES: ICD-10-CM

## 2020-05-27 DIAGNOSIS — Z79.4 UNCONTROLLED TYPE 2 DIABETES MELLITUS WITH HYPERGLYCEMIA, WITH LONG-TERM CURRENT USE OF INSULIN: ICD-10-CM

## 2020-05-28 RX ORDER — HYDROCHLOROTHIAZIDE 25 MG/1
TABLET ORAL
Qty: 90 TABLET | Refills: 0 | Status: SHIPPED | OUTPATIENT
Start: 2020-05-28 | End: 2020-06-20 | Stop reason: SDUPTHER

## 2020-05-28 RX ORDER — METFORMIN HYDROCHLORIDE 1000 MG/1
TABLET ORAL
Qty: 180 TABLET | Refills: 0 | Status: SHIPPED | OUTPATIENT
Start: 2020-05-28 | End: 2020-06-20 | Stop reason: SDUPTHER

## 2020-06-09 RX ORDER — ISOSORBIDE MONONITRATE 30 MG/1
30 TABLET, EXTENDED RELEASE ORAL DAILY
Qty: 90 TABLET | Refills: 3 | Status: SHIPPED | OUTPATIENT
Start: 2020-06-09 | End: 2020-06-29 | Stop reason: SDUPTHER

## 2020-06-10 DIAGNOSIS — Z79.4 UNCONTROLLED TYPE 2 DIABETES MELLITUS WITH HYPERGLYCEMIA, WITH LONG-TERM CURRENT USE OF INSULIN: ICD-10-CM

## 2020-06-10 DIAGNOSIS — F41.9 ANXIETY: ICD-10-CM

## 2020-06-10 DIAGNOSIS — E11.42 DIABETIC PERIPHERAL NEUROPATHY ASSOCIATED WITH TYPE 2 DIABETES MELLITUS: Chronic | ICD-10-CM

## 2020-06-10 DIAGNOSIS — E11.65 UNCONTROLLED TYPE 2 DIABETES MELLITUS WITH HYPERGLYCEMIA, WITH LONG-TERM CURRENT USE OF INSULIN: ICD-10-CM

## 2020-06-11 RX ORDER — PEN NEEDLE, DIABETIC 30 GX3/16"
NEEDLE, DISPOSABLE MISCELLANEOUS
Qty: 400 EACH | Refills: 3 | Status: SHIPPED | OUTPATIENT
Start: 2020-06-11 | End: 2023-06-23 | Stop reason: SDUPTHER

## 2020-06-11 RX ORDER — INSULIN ASPART 100 [IU]/ML
30 INJECTION, SUSPENSION SUBCUTANEOUS
Qty: 4 BOX | Refills: 3 | Status: SHIPPED | OUTPATIENT
Start: 2020-06-11 | End: 2021-10-07 | Stop reason: SDUPTHER

## 2020-06-11 RX ORDER — FLUOXETINE HYDROCHLORIDE 20 MG/1
20 CAPSULE ORAL DAILY
Qty: 90 CAPSULE | Refills: 1 | Status: SHIPPED | OUTPATIENT
Start: 2020-06-11 | End: 2021-02-08

## 2020-06-20 ENCOUNTER — TELEPHONE (OUTPATIENT)
Dept: INTERNAL MEDICINE | Facility: CLINIC | Age: 70
End: 2020-06-20

## 2020-06-20 DIAGNOSIS — Z79.4 UNCONTROLLED TYPE 2 DIABETES MELLITUS WITH HYPERGLYCEMIA, WITH LONG-TERM CURRENT USE OF INSULIN: ICD-10-CM

## 2020-06-20 DIAGNOSIS — E11.59 HYPERTENSION ASSOCIATED WITH DIABETES: ICD-10-CM

## 2020-06-20 DIAGNOSIS — E11.65 UNCONTROLLED TYPE 2 DIABETES MELLITUS WITH HYPERGLYCEMIA, WITH LONG-TERM CURRENT USE OF INSULIN: ICD-10-CM

## 2020-06-20 DIAGNOSIS — I15.2 HYPERTENSION ASSOCIATED WITH DIABETES: ICD-10-CM

## 2020-06-20 RX ORDER — METFORMIN HYDROCHLORIDE 1000 MG/1
1000 TABLET ORAL 2 TIMES DAILY WITH MEALS
Qty: 180 TABLET | Refills: 3 | Status: SHIPPED | OUTPATIENT
Start: 2020-06-20 | End: 2021-04-01

## 2020-06-20 RX ORDER — HYDROCHLOROTHIAZIDE 25 MG/1
25 TABLET ORAL DAILY
Qty: 90 TABLET | Refills: 3 | Status: SHIPPED | OUTPATIENT
Start: 2020-06-20 | End: 2020-06-29 | Stop reason: SDUPTHER

## 2020-06-20 NOTE — TELEPHONE ENCOUNTER
----- Message from Kallie Sandhu sent at 6/16/2020  1:00 PM CDT -----  Regarding: Shirley with [facility]   Requesting an RX refill or new RX.  Is this a refill or new RX:  refill Requesting an RX refill or new RX.  Is this a refill or new RX:    RX name and strength:   Directions (copy/paste from chart):    Is this a 30 day or 90 day RX:    Local pharmacy or mail order pharmacy:    Pharmacy name and phone # (copy/paste from chart):     Comments:    RX name and strength: hydroCHLOROthiazide (HYDRODIURIL) 25 MG tablet  Directions (copy/paste from chart):    Is this a 30 day or 90 day RX:  metFORMIN (GLUCOPHAGE) 1000 MG tablet  Local pharmacy or mail order pharmacy:  [facility] [place] [id number] [facility] [phone] (Phone)  [phone] (Fax)  Pharmacy name and phone # (copy/paste from chart):     Comments:

## 2020-06-29 DIAGNOSIS — M79.89 LEG SWELLING: ICD-10-CM

## 2020-06-29 DIAGNOSIS — E11.59 HYPERTENSION ASSOCIATED WITH DIABETES: ICD-10-CM

## 2020-06-29 DIAGNOSIS — I15.2 HYPERTENSION ASSOCIATED WITH DIABETES: ICD-10-CM

## 2020-07-02 RX ORDER — AMLODIPINE BESYLATE 10 MG/1
10 TABLET ORAL DAILY
Qty: 90 TABLET | Refills: 3 | Status: SHIPPED | OUTPATIENT
Start: 2020-07-02 | End: 2021-05-20

## 2020-07-02 RX ORDER — ISOSORBIDE MONONITRATE 30 MG/1
30 TABLET, EXTENDED RELEASE ORAL DAILY
Qty: 90 TABLET | Refills: 3 | Status: SHIPPED | OUTPATIENT
Start: 2020-07-02 | End: 2021-07-13

## 2020-07-02 RX ORDER — FUROSEMIDE 20 MG/1
20 TABLET ORAL DAILY PRN
Qty: 90 TABLET | Refills: 3 | Status: SHIPPED | OUTPATIENT
Start: 2020-07-02 | End: 2021-04-01

## 2020-07-02 RX ORDER — NITROGLYCERIN 0.4 MG/1
0.4 TABLET SUBLINGUAL EVERY 5 MIN PRN
Qty: 50 TABLET | Refills: 0 | Status: SHIPPED | OUTPATIENT
Start: 2020-07-02 | End: 2020-08-07

## 2020-07-02 RX ORDER — HYDROCHLOROTHIAZIDE 25 MG/1
25 TABLET ORAL DAILY
Qty: 90 TABLET | Refills: 3 | Status: SHIPPED | OUTPATIENT
Start: 2020-07-02 | End: 2021-07-01

## 2020-07-02 RX ORDER — ATENOLOL 50 MG/1
50 TABLET ORAL 2 TIMES DAILY
Qty: 180 TABLET | Refills: 3 | Status: SHIPPED | OUTPATIENT
Start: 2020-07-02 | End: 2021-10-22 | Stop reason: SDUPTHER

## 2020-07-02 RX ORDER — ATORVASTATIN CALCIUM 80 MG/1
80 TABLET, FILM COATED ORAL NIGHTLY
Qty: 90 TABLET | Refills: 3 | Status: SHIPPED | OUTPATIENT
Start: 2020-07-02 | End: 2021-07-08

## 2020-07-10 ENCOUNTER — HOSPITAL ENCOUNTER (EMERGENCY)
Facility: HOSPITAL | Age: 70
Discharge: HOME | End: 2020-07-10
Attending: EMERGENCY MEDICINE
Payer: MEDICARE

## 2020-07-10 VITALS
WEIGHT: 175 LBS | OXYGEN SATURATION: 100 % | DIASTOLIC BLOOD PRESSURE: 44 MMHG | TEMPERATURE: 99 F | SYSTOLIC BLOOD PRESSURE: 139 MMHG | HEART RATE: 84 BPM | HEIGHT: 69 IN | RESPIRATION RATE: 20 BRPM | BODY MASS INDEX: 25.92 KG/M2

## 2020-07-10 DIAGNOSIS — S60.212A CONTUSION OF LEFT WRIST, INITIAL ENCOUNTER: ICD-10-CM

## 2020-07-10 DIAGNOSIS — M25.532 LEFT WRIST PAIN: ICD-10-CM

## 2020-07-10 DIAGNOSIS — M25.522 LEFT ELBOW PAIN: ICD-10-CM

## 2020-07-10 DIAGNOSIS — S52.125A CLOSED NONDISPLACED FRACTURE OF HEAD OF LEFT RADIUS, INITIAL ENCOUNTER: Primary | ICD-10-CM

## 2020-07-10 PROCEDURE — 25000003 PHARM REV CODE 250: Mod: HCNC,ER | Performed by: PHYSICIAN ASSISTANT

## 2020-07-10 PROCEDURE — 99283 EMERGENCY DEPT VISIT LOW MDM: CPT | Mod: 25,HCNC,ER

## 2020-07-10 RX ORDER — HYDROCODONE BITARTRATE AND ACETAMINOPHEN 5; 325 MG/1; MG/1
1 TABLET ORAL
Status: COMPLETED | OUTPATIENT
Start: 2020-07-10 | End: 2020-07-10

## 2020-07-10 RX ORDER — ACETAMINOPHEN 500 MG
500 TABLET ORAL EVERY 6 HOURS PRN
Refills: 0 | Status: ON HOLD | COMMUNITY
Start: 2020-07-10 | End: 2021-06-04 | Stop reason: SDUPTHER

## 2020-07-10 RX ADMIN — HYDROCODONE BITARTRATE AND ACETAMINOPHEN 1 TABLET: 5; 325 TABLET ORAL at 02:07

## 2020-07-10 NOTE — ED PROVIDER NOTES
"Encounter Date: 7/10/2020    SCRIBE #1 NOTE: I, Deidre Xiao, am scribing for, and in the presence of,  ARMIDA Hager. I have scribed the following portions of the note - Other sections scribed: HPI, ROS, PE.       History     Chief Complaint   Patient presents with    Elbow Pain     PT to ER with c/o left elbow pain s/p slip and fall while mopping. Pt stated " i think its broken."      Lorena Contreras is a 69 y.o. female who presents to the ED complaining of left elbow pain and left wrist pain s/p mopping and slipping and landing on the left elbow PTA. Denies head trauma or LOC. Previous left elbow fracture 20 years ago while skating. Denies numbness or tingling. No pain medications taken.    The history is provided by the patient. No  was used.     Review of patient's allergies indicates:   Allergen Reactions    Novolin 70/30 (semi-synthetic) Nausea And Vomiting     Severe vomiting on Relion 70/30    Shellfish containing products Swelling    Sulfa (sulfonamide antibiotics) Anaphylaxis    Victoza [liraglutide] Nausea And Vomiting    Glipizide Nausea Only    Citric acid     Codeine     Egg derived     Iodine and iodide containing products     Rituxan [rituximab]     Talwin [pentazocine lactate]     Zoloft [sertraline]      Past Medical History:   Diagnosis Date    Anxiety     Colon polyps     Coronary artery disease     Depression     Diabetes mellitus, type II     Fibromyalgia     Follicular lymphoma     HTN (hypertension)     MI (myocardial infarction) 03/2019    Restless leg syndrome      Past Surgical History:   Procedure Laterality Date    COLONOSCOPY  11/07/2012    Colon polyp found; repeat in 5 years    ELBOW SURGERY Right     dislocation repair     ESOPHAGOGASTRODUODENOSCOPY  11/07/2012    atrophic gastritis, H pylori testing negative    KNEE SURGERY Bilateral     scoped    LEFT HEART CATHETERIZATION Left 3/29/2019    Procedure: Left heart cath;  Surgeon: " Bladimir Barbosa MD;  Location: [facility] CATH LAB;  Service: Cardiology;  Laterality: Left;    LEFT HEART CATHETERIZATION Left 11/18/2019    Procedure: Left heart cath;  Surgeon: Karl Rico MD;  Location: [facility] CATH LAB;  Service: Cardiology;  Laterality: Left;    LEFT HEART CATHETERIZATION Left 1/8/2020    Procedure: Left heart cath, right radial, noon start;  Surgeon: Christos Monreal MD;  Location: [facility] CATH LAB;  Service: Cardiology;  Laterality: Left;  RN Pre Op 1-6-20.  To be admitted 1-7-20 sor Aspirin Disensitation    ULTRASOUND GUIDANCE  1/8/2020    Procedure: Ultrasound Guidance;  Surgeon: Christos Monreal MD;  Location: [facility] CATH LAB;  Service: Cardiology;;     Family History   Problem Relation Age of Onset    Cancer Mother     Heart disease Mother     Cancer Father         lung    Diabetes Sister     Hypertension Sister     Stroke Neg Hx     Hyperlipidemia Neg Hx      Social History     Tobacco Use    Smoking status: Never Smoker    Smokeless tobacco: Never Used   Substance Use Topics    Alcohol use: No    Drug use: Never     Review of Systems   Respiratory: Negative for shortness of breath.    Cardiovascular: Negative for chest pain.   Musculoskeletal: Positive for arthralgias (left elbow and wrist).   Skin: Negative for wound.   Neurological: Negative for syncope, weakness and numbness.   All other systems reviewed and are negative.      Physical Exam     Initial Vitals [07/10/20 1354]   BP Pulse Resp Temp SpO2   (!) 139/44 84 18 98.6 °F (37 °C) 100 %      MAP       --         Physical Exam    Nursing note and vitals reviewed.  Constitutional: She appears well-developed and well-nourished.   HENT:   Head: Normocephalic and atraumatic.   Eyes: Conjunctivae are normal.   Neck: Normal range of motion. Neck supple.   Cardiovascular: Normal rate and intact distal pulses.   Pulmonary/Chest: Effort normal. No respiratory distress.   Musculoskeletal: Normal range of motion.      Left shoulder:  Normal.      Left elbow: She exhibits normal range of motion, no swelling and no deformity. Tenderness found.      Left wrist: She exhibits tenderness. She exhibits normal range of motion, no swelling, no crepitus and no deformity.      Left hip: Normal.      Left upper arm: Normal.      Left forearm: Normal.        Left hand: Normal.      Comments: Tenderness to left wrist and left lateral elbow and distal humerus.  No snuffbox tenderness.  Contusion to dorsal left wrist.    Neurological: She is alert and oriented to person, place, and time. No sensory deficit.   Skin: Skin is warm and dry. No rash noted. No erythema.   Psychiatric: She has a normal mood and affect.         ED Course   Procedures  Labs Reviewed - No data to display       Imaging Results          X-Ray Elbow Complete Left (Final result)  Result time 07/10/20 14:28:34    Final result by Eddie Montilla MD (07/10/20 14:28:34)                 Impression:      1. Joint effusion noting possible cortical irregularity about the anterior aspect of the left radial head, could reflect nondisplaced fracture.  Clinical correlation is recommended noting other radiographically occult fracture cannot be excluded in the setting of joint effusion.      Electronically signed by: Eddie Montilla MD  Date:    07/10/2020  Time:    14:28             Narrative:    EXAMINATION:  XR ELBOW COMPLETE 3 VIEW LEFT    CLINICAL HISTORY:  Pain in left elbow    TECHNIQUE:  AP, lateral, and oblique views of the left elbow were performed.    COMPARISON:  None    FINDINGS:  Three views left elbow.    There is displacement of the anterior and posterior elbow fat pads.  Anterior humeral line and radiocapitellar line are in appropriate orientation.  Degenerative change noted about the coronoid.  There is questionable cortical irregularity anterior aspect of the right radial head.  Degenerative changes are noted about the medial and lateral humeral epicondyles.  No radiopaque foreign  body.                               X-Ray Wrist Complete Left (Final result)  Result time 07/10/20 14:29:39    Final result by Eddie Montilla MD (07/10/20 14:29:39)                 Impression:      1. No convincing acute displaced fracture or dislocation of the wrist noting there may be mild edema about the dorsal aspect of the wrist.  Clinical correlation recommended.      Electronically signed by: Eddie Montilla MD  Date:    07/10/2020  Time:    14:29             Narrative:    EXAMINATION:  XR WRIST COMPLETE 3 VIEWS LEFT    CLINICAL HISTORY:  Pain in left wrist    TECHNIQUE:  PA, lateral, and oblique views of the left wrist were performed.    COMPARISON:  None    FINDINGS:  Three views left wrist.    Degenerative changes are noted of the wrist.  Degenerative change noted of the 1st carpal metacarpal joint.  There is vascular calcification.  There may be minimal edema about the dorsal aspect of the wrist.  No convincing acute displaced fracture or dislocation.  There is osteopenia.                              X-Rays:   Independently Interpreted Readings:   Other Readings:  X-ray left elbow with subtle joint effusion, suggestive of radial head fracture.  X-ray left wrist without evidence of fracture or dislocation.    Medical Decision Making:   History:   Old Medical Records: I decided to obtain old medical records.  Clinical Tests:   Radiological Study: Ordered and Reviewed  ED Management:  69-year-old female with history exam concerning for radial head fracture from mechanical fall.  No head injury or other serious injury on exam.  Left arm is neurovascularly intact.  X-ray suggestive of radial head fracture.  Left wrist without evidence of fracture or dislocation.  Will treat with sling, early range of motion, wrist splint and orthopedic follow-up.            Scribe Attestation:   Scribe #1: I performed the above scribed service and the documentation accurately describes the services I performed. I attest  to the accuracy of the note.    Jett Arellano                      Clinical Impression:     1. Closed nondisplaced fracture of head of left radius, initial encounter    2. Left elbow pain    3. Left wrist pain    4. Contusion of left wrist, initial encounter                ED Disposition Condition    Discharge Stable        ED Prescriptions     Medication Sig Dispense Start Date End Date Auth. Provider    acetaminophen (TYLENOL) 500 MG tablet Take 1 tablet (500 mg total) by mouth every 6 (six) hours as needed for Pain.  7/10/2020  Jett Arellano PA-C        Follow-up Information     Follow up With Specialties Details Why Contact Info    Kishore Wise MD Orthopedic Surgery   [address]  [address]  [facility] [address]  [phone]      Kenneth Luu MD Internal Medicine   [address]  [place] [address]  [phone]      [facility] Emergency Department Emergency Medicine Go to  If symptoms worsen [address] [facility] [id number] [phone]                                     Jett Arellano PA-C  07/10/20 1449

## 2020-07-15 DIAGNOSIS — S52.122A LEFT RADIAL HEAD FRACTURE: Primary | ICD-10-CM

## 2020-07-20 ENCOUNTER — HOSPITAL ENCOUNTER (OUTPATIENT)
Dept: RADIOLOGY | Facility: HOSPITAL | Age: 70
Discharge: HOME | End: 2020-07-20
Attending: ORTHOPAEDIC SURGERY
Payer: MEDICARE

## 2020-07-20 DIAGNOSIS — S52.122A LEFT RADIAL HEAD FRACTURE: ICD-10-CM

## 2020-07-20 PROCEDURE — 73200 CT UPPER EXTREMITY W/O DYE: CPT | Mod: TC,HCNC,LT

## 2020-07-20 PROCEDURE — 73200 CT ARM ELBOW WITHOUT CONTRAST LEFT: ICD-10-PCS | Mod: 26,HCNC,LT, | Performed by: RADIOLOGY

## 2020-07-20 PROCEDURE — 73200 CT UPPER EXTREMITY W/O DYE: CPT | Mod: 26,HCNC,LT, | Performed by: RADIOLOGY

## 2020-07-23 ENCOUNTER — PATIENT OUTREACH (OUTPATIENT)
Dept: ADMINISTRATIVE | Facility: HOSPITAL | Age: 70
End: 2020-07-23

## 2020-07-23 DIAGNOSIS — M81.0 OSTEOPOROSIS, UNSPECIFIED OSTEOPOROSIS TYPE, UNSPECIFIED PATHOLOGICAL FRACTURE PRESENCE: Primary | ICD-10-CM

## 2020-07-24 ENCOUNTER — TELEPHONE (OUTPATIENT)
Dept: INTERNAL MEDICINE | Facility: CLINIC | Age: 70
End: 2020-07-24

## 2020-08-05 ENCOUNTER — PATIENT OUTREACH (OUTPATIENT)
Dept: ADMINISTRATIVE | Facility: OTHER | Age: 70
End: 2020-08-05

## 2020-08-06 ENCOUNTER — TELEPHONE (OUTPATIENT)
Dept: INTERNAL MEDICINE | Facility: CLINIC | Age: 70
End: 2020-08-06

## 2020-08-06 NOTE — PROGRESS NOTES
Requested updates within Care Everywhere.  Patient's chart was reviewed for overdue MARTIN topics.  Immunizations reconciled.

## 2020-08-06 NOTE — TELEPHONE ENCOUNTER
Pt is schd to for an audio ov on Friday  She is on edge right now and needs something to help calm her  Would like something called in today  [facility] on file is the pharmacy that she uses

## 2020-08-07 RX ORDER — LORAZEPAM 1 MG/1
1 TABLET ORAL EVERY 12 HOURS PRN
Qty: 60 TABLET | Refills: 2 | Status: SHIPPED | OUTPATIENT
Start: 2020-08-07 | End: 2020-11-07 | Stop reason: SDUPTHER

## 2020-08-07 NOTE — TELEPHONE ENCOUNTER
I am sending in a prescription for Ativan to patient's pharmacy. I have sent in enough for a 1 month supply with refills if need up to 3 months. For any more after that she will need to meet with Psychiatry.

## 2020-08-20 DIAGNOSIS — E11.9 TYPE 2 DIABETES MELLITUS WITHOUT COMPLICATION: ICD-10-CM

## 2020-09-06 DIAGNOSIS — R21 SKIN RASH: ICD-10-CM

## 2020-09-06 NOTE — TELEPHONE ENCOUNTER
Care Due:                  Date            Visit Type   Department     Provider  --------------------------------------------------------------------------------                                ESTABLISHED   [facility] INTERNAL  Last Visit: 02-      PATIENT      MEDICINE       KEL STINSON  Next Visit: None Scheduled  None         None Found                                                            Last  Test          Frequency    Reason                     Performed    Due Date  --------------------------------------------------------------------------------    HBA1C.......  6 months...  SITagliptin, insulin,      11- 05-                             metFORMIN................    Powered by [employer]. Reference number: [id number]. 9/06/2020 9:30:50 AM CDT

## 2020-09-08 NOTE — PROGRESS NOTES
Refill Routing Note   Medication(s) are not appropriate for processing by [facility]:       - Medication not active on medication list        Medication Therapy Plan: CDMR: NTBS (A1C); last prescribed 3/18; not active on med list; defer to you  Medication reconciliation completed: No      Automatic Epic Generated Protocol Data:        Requested Prescriptions   Pending Prescriptions Disp Refills    triamcinolone acetonide 0.1% (KENALOG) 0.1 % cream [Pharmacy Med Name: Triamcinolone Acetonide 0.1 % External Cream] 3 Tube 1     Sig: APPLY  CREAM TOPICALLY TO AFFECTED AREA TWICE DAILY AS NEEDED       Dermatology: Corticosteroids - desonide, flurandrenolide, and triamcinolone Failed - 9/6/2020  9:30 AM        Failed - Manual Review: Max refill duration of 6 months.        Passed - Patient is at least 18 years old        Passed - Office visit in past 12 months or future 90 days.     Recent Outpatient Visits            7 months ago Cellulitis of right arm    [facility] Kenneth Luu MD    7 months ago Coronary artery disease, angina presence unspecified, unspecified vessel or lesion type, unspecified whether native or transplanted heart    [facility] Christos Monreal MD    8 months ago Coronary artery disease of native artery of native heart with stable angina pectoris    [facility] Christos Monreal MD    9 months ago Generalized anxiety disorder    [facility] Kenneth Luu MD    9 months ago Acute inferior myocardial infarction    [facility] Karl Rico MD                          Appointments  past 12m or future 3m with PCP    Date Provider   Last Visit   2/6/2020 Kenneth Luu MD   Next Visit   Visit date not found Kenneth Luu MD   ED visits in past 90 days: 1     Note composed:4:50 PM 09/08/2020

## 2020-09-09 RX ORDER — TRIAMCINOLONE ACETONIDE 1 MG/G
CREAM TOPICAL
Qty: 3 TUBE | Refills: 1 | Status: ON HOLD | OUTPATIENT
Start: 2020-09-09 | End: 2021-06-01 | Stop reason: CLARIF

## 2020-10-20 NOTE — TELEPHONE ENCOUNTER
Care Due:                  Date            Visit Type   Department     Provider  --------------------------------------------------------------------------------                                ESTABLISHED   [facility] INTERNAL  Last Visit: 02-      PATIENT      MEDICINE       KEL STINSON                                           [facility] INTERNAL  Next Visit: 10-      PRE-OP       MEDICINE       KEL STINSON                                                            Last  Test          Frequency    Reason                     Performed    Due Date  --------------------------------------------------------------------------------    Cr..........  12 months..  SITagliptin, insulin,      01- 01-                             metFORMIN................    Powered by [employer]. Reference number: [id number]. 10/20/2020 6:21:49 PM   CDT

## 2020-10-21 RX ORDER — SITAGLIPTIN 100 MG/1
TABLET, FILM COATED ORAL
Qty: 90 TABLET | Refills: 1 | Status: SHIPPED | OUTPATIENT
Start: 2020-10-21 | End: 2021-10-22 | Stop reason: SDUPTHER

## 2020-10-21 NOTE — PROGRESS NOTES
Refill Routing Note   Medication(s) are not appropriate for processing by [facility] for the following reason(s):     - Drug-Disease Interaction (SITagliptin and CKD stage 3 secondary to diabetes)    [facility] actions taken in this encounter: Defer    Medication-related problems identified: Drug-disease interaction  Medication Therapy Plan: CDMR. Drug-Disease: SITagliptin and CKD stage 3 secondary to diabetes; Defer to PCP  Medication reconciliation completed: No   Automatic Epic Generated Protocol Data:        Requested Prescriptions   Pending Prescriptions Disp Refills    JANUVIA 100 mg Tab [Pharmacy Med Name: JANUVIA 100 MG Tablet] 90 tablet 0     Sig: TAKE 1 TABLET EVERY DAY       Endocrinology:  Diabetes - DPP-4 Inhibitors Failed - 10/20/2020  4:50 PM        Failed - HBA1C is 7.9 or below and within 180 days     Hemoglobin A1C   Date Value Ref Range Status   11/17/2019 7.5 (H) 4.0 - 5.6 % Final     Comment:     ADA Screening Guidelines:  5.7-6.4%  Consistent with prediabetes  >or=6.5%  Consistent with diabetes  High levels of fetal hemoglobin interfere with the HbA1C  assay. Heterozygous hemoglobin variants (HbS, HgC, etc)do  not significantly interfere with this assay.   However, presence of multiple variants may affect accuracy.     03/28/2019 9.7 (H) 4.0 - 5.6 % Final     Comment:     ADA Screening Guidelines:  5.7-6.4%  Consistent with prediabetes  >or=6.5%  Consistent with diabetes  High levels of fetal hemoglobin interfere with the HbA1C  assay. Heterozygous hemoglobin variants (HbS, HgC, etc)do  not significantly interfere with this assay.   However, presence of multiple variants may affect accuracy.     11/26/2018 8.9 (H) 4.0 - 5.6 % Final     Comment:     ADA Screening Guidelines:  5.7-6.4%  Consistent with prediabetes  >or=6.5%  Consistent with diabetes  High levels of fetal hemoglobin interfere with the HbA1C  assay. Heterozygous hemoglobin variants (HbS, HgC, etc)do  not significantly  interfere with this assay.   However, presence of multiple variants may affect accuracy.     11/26/2018 8.9 (H) 4.0 - 5.6 % Final     Comment:     ADA Screening Guidelines:  5.7-6.4%  Consistent with prediabetes  >or=6.5%  Consistent with diabetes  High levels of fetal hemoglobin interfere with the HbA1C  assay. Heterozygous hemoglobin variants (HbS, HgC, etc)do  not significantly interfere with this assay.   However, presence of multiple variants may affect accuracy.                Passed - Patient is at least 18 years old        Passed - Office Visit within last 12 months or future 90 days.     Recent Outpatient Visits            8 months ago Cellulitis of right arm    [facility] Kenneth Luu MD    9 months ago Coronary artery disease, angina presence unspecified, unspecified vessel or lesion type, unspecified whether native or transplanted heart    [facility] Christos Monreal MD    9 months ago Coronary artery disease of native artery of native heart with stable angina pectoris    [facility] Christos Monreal MD    10 months ago Generalized anxiety disorder    [facility] Kenneth Luu MD    10 months ago Acute inferior myocardial infarction    [facility] Karl Rico MD          Future Appointments              In 1 week Kenneth Luu MD [facility]                Passed - Cr is 1.3 or below and within 360 days     Creatinine   Date Value Ref Range Status   01/09/2020 1.0 0.5 - 1.4 mg/dL Final   01/06/2020 1.4 0.5 - 1.4 mg/dL Final   11/18/2019 1.1 0.5 - 1.4 mg/dL Final     POC Creatinine   Date Value Ref Range Status   07/29/2018 0.9 0.6 - 1.2 mg/dL Final              Passed - eGFR is 60 or above and within 360 days     eGFR if non    Date Value Ref Range Status   01/09/2020 58 (A) >60 mL/min/1.73 m^2 Final     Comment:     Calculation used to obtain the estimated  glomerular filtration  rate (eGFR) is the CKD-EPI equation.      01/06/2020 38 (A) >60 mL/min/1.73 m^2 Final     Comment:     Calculation used to obtain the estimated glomerular filtration  rate (eGFR) is the CKD-EPI equation.      11/18/2019 51 (A) >60 mL/min/1.73 m^2 Final     Comment:     Calculation used to obtain the estimated glomerular filtration  rate (eGFR) is the CKD-EPI equation.        eGFR if    Date Value Ref Range Status   01/09/2020 >60 >60 mL/min/1.73 m^2 Final   01/06/2020 44 (A) >60 mL/min/1.73 m^2 Final   11/18/2019 59 (A) >60 mL/min/1.73 m^2 Final                    Appointments  past 12m or future 3m with PCP    Date Provider   Last Visit   2/6/2020 Kenneth Luu MD   Next Visit   10/31/2020 Kenneth Luu MD   ED visits in past 90 days: 0        Note composed:8:52 AM 10/21/2020

## 2020-11-06 DIAGNOSIS — E11.9 TYPE 2 DIABETES MELLITUS WITHOUT COMPLICATION: ICD-10-CM

## 2020-11-07 ENCOUNTER — OFFICE VISIT (OUTPATIENT)
Dept: INTERNAL MEDICINE | Facility: CLINIC | Age: 70
End: 2020-11-07
Payer: MEDICARE

## 2020-11-07 ENCOUNTER — LAB VISIT (OUTPATIENT)
Dept: LAB | Facility: HOSPITAL | Age: 70
End: 2020-11-07
Attending: INTERNAL MEDICINE
Payer: MEDICARE

## 2020-11-07 VITALS
BODY MASS INDEX: 26.06 KG/M2 | OXYGEN SATURATION: 97 % | HEIGHT: 69 IN | DIASTOLIC BLOOD PRESSURE: 58 MMHG | HEART RATE: 83 BPM | SYSTOLIC BLOOD PRESSURE: 136 MMHG | WEIGHT: 175.94 LBS

## 2020-11-07 DIAGNOSIS — E11.69 HYPERLIPIDEMIA ASSOCIATED WITH TYPE 2 DIABETES MELLITUS: Chronic | ICD-10-CM

## 2020-11-07 DIAGNOSIS — H26.9 CATARACT, UNSPECIFIED CATARACT TYPE, UNSPECIFIED LATERALITY: Primary | ICD-10-CM

## 2020-11-07 DIAGNOSIS — F33.41 RECURRENT MAJOR DEPRESSIVE DISORDER, IN PARTIAL REMISSION: ICD-10-CM

## 2020-11-07 DIAGNOSIS — E78.5 HYPERLIPIDEMIA ASSOCIATED WITH TYPE 2 DIABETES MELLITUS: Chronic | ICD-10-CM

## 2020-11-07 DIAGNOSIS — E11.59 HYPERTENSION ASSOCIATED WITH DIABETES: Chronic | ICD-10-CM

## 2020-11-07 DIAGNOSIS — I15.2 HYPERTENSION ASSOCIATED WITH DIABETES: Chronic | ICD-10-CM

## 2020-11-07 DIAGNOSIS — E11.51 PERIPHERAL VASCULAR DISEASE IN DIABETES MELLITUS: ICD-10-CM

## 2020-11-07 DIAGNOSIS — Z79.899 OTHER LONG TERM (CURRENT) DRUG THERAPY: ICD-10-CM

## 2020-11-07 DIAGNOSIS — Z01.818 PRE-OP EVALUATION: ICD-10-CM

## 2020-11-07 DIAGNOSIS — F41.9 ANXIETY: ICD-10-CM

## 2020-11-07 LAB
ALBUMIN SERPL BCP-MCNC: 3.3 G/DL (ref 3.5–5.2)
ALP SERPL-CCNC: 116 U/L (ref 55–135)
ALT SERPL W/O P-5'-P-CCNC: 18 U/L (ref 10–44)
ANION GAP SERPL CALC-SCNC: 12 MMOL/L (ref 8–16)
AST SERPL-CCNC: 20 U/L (ref 10–40)
BILIRUB SERPL-MCNC: 0.4 MG/DL (ref 0.1–1)
BUN SERPL-MCNC: 32 MG/DL (ref 8–23)
CALCIUM SERPL-MCNC: 9.5 MG/DL (ref 8.7–10.5)
CHLORIDE SERPL-SCNC: 99 MMOL/L (ref 95–110)
CHOLEST SERPL-MCNC: 99 MG/DL (ref 120–199)
CHOLEST/HDLC SERPL: 2.6 {RATIO} (ref 2–5)
CO2 SERPL-SCNC: 25 MMOL/L (ref 23–29)
CREAT SERPL-MCNC: 1.2 MG/DL (ref 0.5–1.4)
ERYTHROCYTE [DISTWIDTH] IN BLOOD BY AUTOMATED COUNT: 15.6 % (ref 11.5–14.5)
EST. GFR  (AFRICAN AMERICAN): 52.9 ML/MIN/1.73 M^2
EST. GFR  (NON AFRICAN AMERICAN): 45.9 ML/MIN/1.73 M^2
ESTIMATED AVG GLUCOSE: 295 MG/DL (ref 68–131)
GLUCOSE SERPL-MCNC: 345 MG/DL (ref 70–110)
HBA1C MFR BLD HPLC: 11.9 % (ref 4–5.6)
HCT VFR BLD AUTO: 31.8 % (ref 37–48.5)
HDLC SERPL-MCNC: 38 MG/DL (ref 40–75)
HDLC SERPL: 38.4 % (ref 20–50)
HGB BLD-MCNC: 9.5 G/DL (ref 12–16)
LDLC SERPL CALC-MCNC: 33 MG/DL (ref 63–159)
MAGNESIUM SERPL-MCNC: 1.2 MG/DL (ref 1.6–2.6)
MCH RBC QN AUTO: 25.1 PG (ref 27–31)
MCHC RBC AUTO-ENTMCNC: 29.9 G/DL (ref 32–36)
MCV RBC AUTO: 84 FL (ref 82–98)
NONHDLC SERPL-MCNC: 61 MG/DL
PLATELET # BLD AUTO: 218 K/UL (ref 150–350)
PMV BLD AUTO: 13.7 FL (ref 9.2–12.9)
POTASSIUM SERPL-SCNC: 4.3 MMOL/L (ref 3.5–5.1)
PROT SERPL-MCNC: 6.9 G/DL (ref 6–8.4)
RBC # BLD AUTO: 3.78 M/UL (ref 4–5.4)
SODIUM SERPL-SCNC: 136 MMOL/L (ref 136–145)
TRIGL SERPL-MCNC: 140 MG/DL (ref 30–150)
VIT B12 SERPL-MCNC: 208 PG/ML (ref 210–950)
WBC # BLD AUTO: 12.7 K/UL (ref 3.9–12.7)

## 2020-11-07 PROCEDURE — 80061 LIPID PANEL: CPT | Mod: HCNC

## 2020-11-07 PROCEDURE — 3008F PR BODY MASS INDEX (BMI) DOCUMENTED: ICD-10-PCS | Mod: HCNC,CPTII,S$GLB, | Performed by: INTERNAL MEDICINE

## 2020-11-07 PROCEDURE — 1159F PR MEDICATION LIST DOCUMENTED IN MEDICAL RECORD: ICD-10-PCS | Mod: HCNC,S$GLB,, | Performed by: INTERNAL MEDICINE

## 2020-11-07 PROCEDURE — 99999 PR PBB SHADOW E&M-EST. PATIENT-LVL V: ICD-10-PCS | Mod: PBBFAC,HCNC,, | Performed by: INTERNAL MEDICINE

## 2020-11-07 PROCEDURE — 99215 PR OFFICE/OUTPT VISIT, EST, LEVL V, 40-54 MIN: ICD-10-PCS | Mod: HCNC,S$GLB,, | Performed by: INTERNAL MEDICINE

## 2020-11-07 PROCEDURE — 1100F PTFALLS ASSESS-DOCD GE2>/YR: CPT | Mod: HCNC,CPTII,S$GLB, | Performed by: INTERNAL MEDICINE

## 2020-11-07 PROCEDURE — 3075F PR MOST RECENT SYSTOLIC BLOOD PRESS GE 130-139MM HG: ICD-10-PCS | Mod: HCNC,CPTII,S$GLB, | Performed by: INTERNAL MEDICINE

## 2020-11-07 PROCEDURE — 3046F PR MOST RECENT HEMOGLOBIN A1C LEVEL > 9.0%: ICD-10-PCS | Mod: HCNC,CPTII,S$GLB, | Performed by: INTERNAL MEDICINE

## 2020-11-07 PROCEDURE — 1100F PR PT FALLS ASSESS DOC 2+ FALLS/FALL W/INJURY/YR: ICD-10-PCS | Mod: HCNC,CPTII,S$GLB, | Performed by: INTERNAL MEDICINE

## 2020-11-07 PROCEDURE — 3078F PR MOST RECENT DIASTOLIC BLOOD PRESSURE < 80 MM HG: ICD-10-PCS | Mod: HCNC,CPTII,S$GLB, | Performed by: INTERNAL MEDICINE

## 2020-11-07 PROCEDURE — 85027 COMPLETE CBC AUTOMATED: CPT | Mod: HCNC

## 2020-11-07 PROCEDURE — 3288F PR FALLS RISK ASSESSMENT DOCUMENTED: ICD-10-PCS | Mod: HCNC,CPTII,S$GLB, | Performed by: INTERNAL MEDICINE

## 2020-11-07 PROCEDURE — 36415 COLL VENOUS BLD VENIPUNCTURE: CPT | Mod: HCNC

## 2020-11-07 PROCEDURE — 82607 VITAMIN B-12: CPT | Mod: HCNC

## 2020-11-07 PROCEDURE — 80053 COMPREHEN METABOLIC PANEL: CPT | Mod: HCNC

## 2020-11-07 PROCEDURE — 3075F SYST BP GE 130 - 139MM HG: CPT | Mod: HCNC,CPTII,S$GLB, | Performed by: INTERNAL MEDICINE

## 2020-11-07 PROCEDURE — 3046F HEMOGLOBIN A1C LEVEL >9.0%: CPT | Mod: HCNC,CPTII,S$GLB, | Performed by: INTERNAL MEDICINE

## 2020-11-07 PROCEDURE — 1159F MED LIST DOCD IN RCRD: CPT | Mod: HCNC,S$GLB,, | Performed by: INTERNAL MEDICINE

## 2020-11-07 PROCEDURE — 3288F FALL RISK ASSESSMENT DOCD: CPT | Mod: HCNC,CPTII,S$GLB, | Performed by: INTERNAL MEDICINE

## 2020-11-07 PROCEDURE — 99999 PR PBB SHADOW E&M-EST. PATIENT-LVL V: CPT | Mod: PBBFAC,HCNC,, | Performed by: INTERNAL MEDICINE

## 2020-11-07 PROCEDURE — 99215 OFFICE O/P EST HI 40 MIN: CPT | Mod: HCNC,S$GLB,, | Performed by: INTERNAL MEDICINE

## 2020-11-07 PROCEDURE — 83735 ASSAY OF MAGNESIUM: CPT | Mod: HCNC

## 2020-11-07 PROCEDURE — 83036 HEMOGLOBIN GLYCOSYLATED A1C: CPT | Mod: HCNC

## 2020-11-07 PROCEDURE — 99499 UNLISTED E&M SERVICE: CPT | Mod: S$GLB,,, | Performed by: INTERNAL MEDICINE

## 2020-11-07 PROCEDURE — 99499 RISK ADDL DX/OHS AUDIT: ICD-10-PCS | Mod: S$GLB,,, | Performed by: INTERNAL MEDICINE

## 2020-11-07 PROCEDURE — 3008F BODY MASS INDEX DOCD: CPT | Mod: HCNC,CPTII,S$GLB, | Performed by: INTERNAL MEDICINE

## 2020-11-07 PROCEDURE — 3078F DIAST BP <80 MM HG: CPT | Mod: HCNC,CPTII,S$GLB, | Performed by: INTERNAL MEDICINE

## 2020-11-07 RX ORDER — GLUCOSAM/CHON-MSM1/C/MANG/BOSW 500-416.6
TABLET ORAL
COMMUNITY
Start: 2020-11-03 | End: 2023-11-03 | Stop reason: CLARIF

## 2020-11-07 RX ORDER — LORAZEPAM 1 MG/1
1 TABLET ORAL EVERY 12 HOURS PRN
Qty: 60 TABLET | Refills: 1 | Status: ON HOLD | OUTPATIENT
Start: 2020-11-07 | End: 2021-06-01 | Stop reason: HOSPADM

## 2020-11-07 NOTE — PROGRESS NOTES
Subjective:       Patient ID: Lorena Contreras is a 70 y.o. female.    Chief Complaint: Pre-op Exam (clearance)      Last visit with me 2020.  Since then has been to the emergency room, also seen by outside Optometry and Orthopedics.    HPI    Patient's   on hospice care 3 weeks ago.     Patient reports not performing regular self-care while taking care of her . Notes increased pain in the feet and feeling unsteady.    Reviewed PMH, PSH, SH, FH, allergies, and medications.     Review of Systems   All other systems reviewed and are negative.      Objective:      Physical Exam  Vitals signs and nursing note reviewed.   Constitutional:       General: She is not in acute distress.     Appearance: She is not diaphoretic.   HENT:      Head: Atraumatic.      Right Ear: External ear normal. A middle ear effusion (serous) is present.      Left Ear: Tympanic membrane and external ear normal.  No middle ear effusion.   Neck:      Musculoskeletal: Normal range of motion.      Thyroid: No thyromegaly.   Cardiovascular:      Rate and Rhythm: Normal rate and regular rhythm.      Pulses:           Dorsalis pedis pulses are 0 on the right side and 0 on the left side.        Posterior tibial pulses are 0 on the right side and 0 on the left side.      Heart sounds: Normal heart sounds.   Pulmonary:      Effort: Pulmonary effort is normal.      Breath sounds: Normal breath sounds. No stridor. No wheezing or rales.   Abdominal:      Palpations: Abdomen is soft.      Tenderness: There is no abdominal tenderness. There is no guarding.   Musculoskeletal:         General: No tenderness.      Right lower leg: No edema.      Left lower leg: No edema.      Right foot: Normal range of motion. No deformity.      Left foot: Normal range of motion. No deformity.   Feet:      Right foot:      Protective Sensation: 5 sites tested. 3 sites sensed.      Skin integrity: Skin integrity normal. No ulcer, blister, skin breakdown,  "erythema, warmth, callus, dry skin or fissure.      Left foot:      Protective Sensation: 5 sites tested. 3 sites sensed.      Skin integrity: Skin integrity normal. No ulcer, blister, skin breakdown, erythema, warmth, callus, dry skin or fissure.   Lymphadenopathy:      Cervical: No cervical adenopathy.   Skin:     General: Skin is warm and dry.      Findings: No rash.   Neurological:      General: No focal deficit present.      Mental Status: She is alert and oriented to person, place, and time.   Psychiatric:         Mood and Affect: Mood is anxious.         Behavior: Behavior normal.         Vitals:    11/07/20 0806 11/07/20 0826   BP: (!) 140/68 (!) 136/58   BP Location: Right arm Right arm   Patient Position: Sitting Sitting   BP Method: Medium (Manual) Medium (Manual)   Pulse: 83    SpO2: 97%    Weight: 79.8 kg (175 lb 14.8 oz)    Height: 5' 9" (1.753 m)      Body mass index is 25.98 kg/m².    I have personally checked the blood pressure and documented my findings.     RESULTS: Reviewed labs from last 12 months    Assessment:       1. Cataract, unspecified cataract type, unspecified laterality    2. Uncontrolled type 2 diabetes mellitus with diabetic polyneuropathy, with long-term current use of insulin    3. Hyperlipidemia associated with type 2 diabetes mellitus    4. Recurrent major depressive disorder, in partial remission    5. Peripheral vascular disease in diabetes mellitus    6. Anxiety    7. Hypertension associated with diabetes    8. Other long term (current) drug therapy    9. Pre-op evaluation        Plan:   Lorena was seen today for pre-op exam.    Diagnoses and all orders for this visit:    Cataract, unspecified cataract type, unspecified laterality:  Prior diagnosis, going for surgery with Ophthalmology, see preop evaluation discussion below.    Uncontrolled type 2 diabetes mellitus with diabetic polyneuropathy, with long-term current use of insulin:  Prior diagnosis, not well controlled, " recheck A1c, patient was recently caring for  on hospice but now will need to focus attention on self care.  -     CBC Without Differential; Future  -     Comprehensive Metabolic Panel; Future  -     Hemoglobin A1C; Future    Hyperlipidemia associated with type 2 diabetes mellitus:  Prior diagnosis, stable, well controlled on current management. No changes at this time, will continue to monitor.   -     Lipid Panel; Future    Recurrent major depressive disorder, in partial remission:  Prior diagnosis, persists, continue fluoxetine. Will likely need to see Psychiatry in future.    Peripheral vascular disease in diabetes mellitus:  Prior diagnosis, CLEMENT abnormal in past. No foot ulcer currently, continue to monitor closely.    Anxiety:  Prior diagnosis, patient more anxious and stressed lately following 's death. I will prescribe Ativan to use PRN however I will leave any future prescriptions to Psychiatry.  -     LORazepam (ATIVAN) 1 MG tablet; Take 1 tablet (1 mg total) by mouth every 12 (twelve) hours as needed for Anxiety.    Hypertension associated with diabetes:  Prior diagnosis, stable, sufficiently controlled on current management. No changes at this time, will continue to monitor.   -     Magnesium; Future    Other long term (current) drug therapy  -     Vitamin B12; Future    Pre-op evaluation:  Patient has >= 4 METS. She has coronary artery disease s/p stent and has continued on her DAPT. She will continue ASA and ticagrelor perioperatively, the surgeon agrees with this. Patient clear for surgical procedure, I recommend proceeding with surgery as planned.      Follow up in about 3 months (around 2/7/2021) for follow up visit. Labs today.  Kenneth Luu MD, FACP [facility]    Portions of this note were completed using medical dictation software. Please excuse typographical or syntax errors that were missed on review.

## 2020-11-09 ENCOUNTER — TELEPHONE (OUTPATIENT)
Dept: INTERNAL MEDICINE | Facility: CLINIC | Age: 70
End: 2020-11-09

## 2020-11-16 ENCOUNTER — TELEPHONE (OUTPATIENT)
Dept: INTERNAL MEDICINE | Facility: CLINIC | Age: 70
End: 2020-11-16

## 2021-01-13 ENCOUNTER — OFFICE VISIT (OUTPATIENT)
Dept: INTERNAL MEDICINE | Facility: CLINIC | Age: 71
End: 2021-01-13
Payer: MEDICARE

## 2021-01-13 VITALS
SYSTOLIC BLOOD PRESSURE: 146 MMHG | WEIGHT: 183 LBS | HEART RATE: 90 BPM | DIASTOLIC BLOOD PRESSURE: 58 MMHG | HEIGHT: 70 IN | BODY MASS INDEX: 26.2 KG/M2 | OXYGEN SATURATION: 98 %

## 2021-01-13 DIAGNOSIS — Z01.818 PRE-OP EVALUATION: ICD-10-CM

## 2021-01-13 DIAGNOSIS — E11.59 HYPERTENSION ASSOCIATED WITH DIABETES: Chronic | ICD-10-CM

## 2021-01-13 DIAGNOSIS — I25.10 CORONARY ARTERY DISEASE INVOLVING NATIVE CORONARY ARTERY OF NATIVE HEART WITHOUT ANGINA PECTORIS: ICD-10-CM

## 2021-01-13 DIAGNOSIS — H26.9 CATARACT, UNSPECIFIED CATARACT TYPE, UNSPECIFIED LATERALITY: Primary | ICD-10-CM

## 2021-01-13 DIAGNOSIS — I15.2 HYPERTENSION ASSOCIATED WITH DIABETES: Chronic | ICD-10-CM

## 2021-01-13 DIAGNOSIS — F33.41 RECURRENT MAJOR DEPRESSIVE DISORDER, IN PARTIAL REMISSION: ICD-10-CM

## 2021-01-13 PROCEDURE — 3046F HEMOGLOBIN A1C LEVEL >9.0%: CPT | Mod: CPTII,S$GLB,, | Performed by: INTERNAL MEDICINE

## 2021-01-13 PROCEDURE — 1159F PR MEDICATION LIST DOCUMENTED IN MEDICAL RECORD: ICD-10-PCS | Mod: S$GLB,,, | Performed by: INTERNAL MEDICINE

## 2021-01-13 PROCEDURE — 99999 PR PBB SHADOW E&M-EST. PATIENT-LVL V: CPT | Mod: PBBFAC,,, | Performed by: INTERNAL MEDICINE

## 2021-01-13 PROCEDURE — 3078F DIAST BP <80 MM HG: CPT | Mod: CPTII,S$GLB,, | Performed by: INTERNAL MEDICINE

## 2021-01-13 PROCEDURE — 3077F SYST BP >= 140 MM HG: CPT | Mod: CPTII,S$GLB,, | Performed by: INTERNAL MEDICINE

## 2021-01-13 PROCEDURE — 3046F PR MOST RECENT HEMOGLOBIN A1C LEVEL > 9.0%: ICD-10-PCS | Mod: CPTII,S$GLB,, | Performed by: INTERNAL MEDICINE

## 2021-01-13 PROCEDURE — 3077F PR MOST RECENT SYSTOLIC BLOOD PRESSURE >= 140 MM HG: ICD-10-PCS | Mod: CPTII,S$GLB,, | Performed by: INTERNAL MEDICINE

## 2021-01-13 PROCEDURE — 3008F BODY MASS INDEX DOCD: CPT | Mod: CPTII,S$GLB,, | Performed by: INTERNAL MEDICINE

## 2021-01-13 PROCEDURE — 99999 PR PBB SHADOW E&M-EST. PATIENT-LVL V: ICD-10-PCS | Mod: PBBFAC,,, | Performed by: INTERNAL MEDICINE

## 2021-01-13 PROCEDURE — 1126F AMNT PAIN NOTED NONE PRSNT: CPT | Mod: S$GLB,,, | Performed by: INTERNAL MEDICINE

## 2021-01-13 PROCEDURE — 1101F PR PT FALLS ASSESS DOC 0-1 FALLS W/OUT INJ PAST YR: ICD-10-PCS | Mod: CPTII,S$GLB,, | Performed by: INTERNAL MEDICINE

## 2021-01-13 PROCEDURE — 3288F PR FALLS RISK ASSESSMENT DOCUMENTED: ICD-10-PCS | Mod: CPTII,S$GLB,, | Performed by: INTERNAL MEDICINE

## 2021-01-13 PROCEDURE — 3008F PR BODY MASS INDEX (BMI) DOCUMENTED: ICD-10-PCS | Mod: CPTII,S$GLB,, | Performed by: INTERNAL MEDICINE

## 2021-01-13 PROCEDURE — 99214 OFFICE O/P EST MOD 30 MIN: CPT | Mod: S$GLB,,, | Performed by: INTERNAL MEDICINE

## 2021-01-13 PROCEDURE — 3078F PR MOST RECENT DIASTOLIC BLOOD PRESSURE < 80 MM HG: ICD-10-PCS | Mod: CPTII,S$GLB,, | Performed by: INTERNAL MEDICINE

## 2021-01-13 PROCEDURE — 99214 PR OFFICE/OUTPT VISIT, EST, LEVL IV, 30-39 MIN: ICD-10-PCS | Mod: S$GLB,,, | Performed by: INTERNAL MEDICINE

## 2021-01-13 PROCEDURE — 3288F FALL RISK ASSESSMENT DOCD: CPT | Mod: CPTII,S$GLB,, | Performed by: INTERNAL MEDICINE

## 2021-01-13 PROCEDURE — 1101F PT FALLS ASSESS-DOCD LE1/YR: CPT | Mod: CPTII,S$GLB,, | Performed by: INTERNAL MEDICINE

## 2021-01-13 PROCEDURE — 1159F MED LIST DOCD IN RCRD: CPT | Mod: S$GLB,,, | Performed by: INTERNAL MEDICINE

## 2021-01-13 PROCEDURE — 1126F PR PAIN SEVERITY QUANTIFIED, NO PAIN PRESENT: ICD-10-PCS | Mod: S$GLB,,, | Performed by: INTERNAL MEDICINE

## 2021-01-14 ENCOUNTER — OFFICE VISIT (OUTPATIENT)
Dept: CARDIOLOGY | Facility: CLINIC | Age: 71
End: 2021-01-14
Payer: MEDICARE

## 2021-01-14 ENCOUNTER — PATIENT OUTREACH (OUTPATIENT)
Dept: ADMINISTRATIVE | Facility: OTHER | Age: 71
End: 2021-01-14

## 2021-01-14 VITALS
SYSTOLIC BLOOD PRESSURE: 124 MMHG | BODY MASS INDEX: 25.73 KG/M2 | DIASTOLIC BLOOD PRESSURE: 62 MMHG | WEIGHT: 179.69 LBS | OXYGEN SATURATION: 98 % | HEART RATE: 91 BPM | HEIGHT: 70 IN

## 2021-01-14 DIAGNOSIS — E11.69 HYPERLIPIDEMIA ASSOCIATED WITH TYPE 2 DIABETES MELLITUS: Chronic | ICD-10-CM

## 2021-01-14 DIAGNOSIS — I51.7 CARDIOMEGALY: ICD-10-CM

## 2021-01-14 DIAGNOSIS — E11.59 HYPERTENSION ASSOCIATED WITH DIABETES: Primary | Chronic | ICD-10-CM

## 2021-01-14 DIAGNOSIS — I25.10 CORONARY ARTERY DISEASE INVOLVING NATIVE CORONARY ARTERY OF NATIVE HEART WITHOUT ANGINA PECTORIS: ICD-10-CM

## 2021-01-14 DIAGNOSIS — R06.02 SOB (SHORTNESS OF BREATH): ICD-10-CM

## 2021-01-14 DIAGNOSIS — E78.5 HYPERLIPIDEMIA ASSOCIATED WITH TYPE 2 DIABETES MELLITUS: Chronic | ICD-10-CM

## 2021-01-14 DIAGNOSIS — R00.2 PALPITATIONS: Chronic | ICD-10-CM

## 2021-01-14 DIAGNOSIS — I15.2 HYPERTENSION ASSOCIATED WITH DIABETES: Primary | Chronic | ICD-10-CM

## 2021-01-14 PROCEDURE — 1159F MED LIST DOCD IN RCRD: CPT | Mod: S$GLB,,, | Performed by: INTERNAL MEDICINE

## 2021-01-14 PROCEDURE — 3008F PR BODY MASS INDEX (BMI) DOCUMENTED: ICD-10-PCS | Mod: CPTII,S$GLB,, | Performed by: INTERNAL MEDICINE

## 2021-01-14 PROCEDURE — 99999 PR PBB SHADOW E&M-EST. PATIENT-LVL V: CPT | Mod: PBBFAC,,, | Performed by: INTERNAL MEDICINE

## 2021-01-14 PROCEDURE — 3008F BODY MASS INDEX DOCD: CPT | Mod: CPTII,S$GLB,, | Performed by: INTERNAL MEDICINE

## 2021-01-14 PROCEDURE — 99999 PR PBB SHADOW E&M-EST. PATIENT-LVL V: ICD-10-PCS | Mod: PBBFAC,,, | Performed by: INTERNAL MEDICINE

## 2021-01-14 PROCEDURE — 93000 ELECTROCARDIOGRAM COMPLETE: CPT | Mod: S$GLB,,, | Performed by: INTERNAL MEDICINE

## 2021-01-14 PROCEDURE — 1101F PT FALLS ASSESS-DOCD LE1/YR: CPT | Mod: CPTII,S$GLB,, | Performed by: INTERNAL MEDICINE

## 2021-01-14 PROCEDURE — 99499 UNLISTED E&M SERVICE: CPT | Mod: S$PBB,,, | Performed by: INTERNAL MEDICINE

## 2021-01-14 PROCEDURE — 1125F PR PAIN SEVERITY QUANTIFIED, PAIN PRESENT: ICD-10-PCS | Mod: S$GLB,,, | Performed by: INTERNAL MEDICINE

## 2021-01-14 PROCEDURE — 3074F SYST BP LT 130 MM HG: CPT | Mod: CPTII,S$GLB,, | Performed by: INTERNAL MEDICINE

## 2021-01-14 PROCEDURE — 99214 OFFICE O/P EST MOD 30 MIN: CPT | Mod: S$GLB,,, | Performed by: INTERNAL MEDICINE

## 2021-01-14 PROCEDURE — 3078F DIAST BP <80 MM HG: CPT | Mod: CPTII,S$GLB,, | Performed by: INTERNAL MEDICINE

## 2021-01-14 PROCEDURE — 1159F PR MEDICATION LIST DOCUMENTED IN MEDICAL RECORD: ICD-10-PCS | Mod: S$GLB,,, | Performed by: INTERNAL MEDICINE

## 2021-01-14 PROCEDURE — 3046F HEMOGLOBIN A1C LEVEL >9.0%: CPT | Mod: CPTII,S$GLB,, | Performed by: INTERNAL MEDICINE

## 2021-01-14 PROCEDURE — 1125F AMNT PAIN NOTED PAIN PRSNT: CPT | Mod: S$GLB,,, | Performed by: INTERNAL MEDICINE

## 2021-01-14 PROCEDURE — 1101F PR PT FALLS ASSESS DOC 0-1 FALLS W/OUT INJ PAST YR: ICD-10-PCS | Mod: CPTII,S$GLB,, | Performed by: INTERNAL MEDICINE

## 2021-01-14 PROCEDURE — 3074F PR MOST RECENT SYSTOLIC BLOOD PRESSURE < 130 MM HG: ICD-10-PCS | Mod: CPTII,S$GLB,, | Performed by: INTERNAL MEDICINE

## 2021-01-14 PROCEDURE — 3078F PR MOST RECENT DIASTOLIC BLOOD PRESSURE < 80 MM HG: ICD-10-PCS | Mod: CPTII,S$GLB,, | Performed by: INTERNAL MEDICINE

## 2021-01-14 PROCEDURE — 3288F FALL RISK ASSESSMENT DOCD: CPT | Mod: CPTII,S$GLB,, | Performed by: INTERNAL MEDICINE

## 2021-01-14 PROCEDURE — 99214 PR OFFICE/OUTPT VISIT, EST, LEVL IV, 30-39 MIN: ICD-10-PCS | Mod: S$GLB,,, | Performed by: INTERNAL MEDICINE

## 2021-01-14 PROCEDURE — 3046F PR MOST RECENT HEMOGLOBIN A1C LEVEL > 9.0%: ICD-10-PCS | Mod: CPTII,S$GLB,, | Performed by: INTERNAL MEDICINE

## 2021-01-14 PROCEDURE — 3288F PR FALLS RISK ASSESSMENT DOCUMENTED: ICD-10-PCS | Mod: CPTII,S$GLB,, | Performed by: INTERNAL MEDICINE

## 2021-01-14 PROCEDURE — 99499 RISK ADDL DX/OHS AUDIT: ICD-10-PCS | Mod: S$PBB,,, | Performed by: INTERNAL MEDICINE

## 2021-01-14 PROCEDURE — 93000 EKG 12-LEAD: ICD-10-PCS | Mod: S$GLB,,, | Performed by: INTERNAL MEDICINE

## 2021-01-27 ENCOUNTER — PATIENT OUTREACH (OUTPATIENT)
Dept: ADMINISTRATIVE | Facility: HOSPITAL | Age: 71
End: 2021-01-27

## 2021-02-05 ENCOUNTER — TELEPHONE (OUTPATIENT)
Dept: INTERNAL MEDICINE | Facility: CLINIC | Age: 71
End: 2021-02-05

## 2021-02-09 ENCOUNTER — PES CALL (OUTPATIENT)
Dept: ADMINISTRATIVE | Facility: CLINIC | Age: 71
End: 2021-02-09

## 2021-02-11 RX ORDER — PANTOPRAZOLE SODIUM 40 MG/1
40 TABLET, DELAYED RELEASE ORAL DAILY
Qty: 90 TABLET | Refills: 0 | Status: SHIPPED | OUTPATIENT
Start: 2021-02-11 | End: 2021-06-07 | Stop reason: SDUPTHER

## 2021-03-31 DIAGNOSIS — E11.65 UNCONTROLLED TYPE 2 DIABETES MELLITUS WITH HYPERGLYCEMIA, WITH LONG-TERM CURRENT USE OF INSULIN: ICD-10-CM

## 2021-03-31 DIAGNOSIS — Z79.4 UNCONTROLLED TYPE 2 DIABETES MELLITUS WITH HYPERGLYCEMIA, WITH LONG-TERM CURRENT USE OF INSULIN: ICD-10-CM

## 2021-04-01 RX ORDER — METFORMIN HYDROCHLORIDE 1000 MG/1
TABLET ORAL
Qty: 180 TABLET | Refills: 3 | Status: SHIPPED | OUTPATIENT
Start: 2021-04-01 | End: 2022-05-26 | Stop reason: SDUPTHER

## 2021-04-09 NOTE — PROGRESS NOTES
Refill Authorization Note     is requesting a refill authorization.    Brief assessment and rationale for refill: APPROVE: prr          Medication Therapy Plan: BP elevated 01/10/20 ED visit(Dental abscess), no information on 01/09/20 but on 01/08/20 BP elevated at Cardiology Hospital Encounter; FOVS(02/20-Hospital FU); Approve 3 more months                              Comments:   Requested Prescriptions   Pending Prescriptions Disp Refills    amLODIPine (NORVASC) 10 MG tablet [Pharmacy Med Name: amLODIPine Besylate 10 MG Oral Tablet] 90 tablet 0     Sig: TAKE 1 TABLET BY MOUTH ONCE DAILY       Cardiovascular:  Calcium Channel Blockers Failed - 1/10/2020  1:40 PM        Failed - Last BP in normal range within 360 days     BP Readings from Last 3 Encounters:   01/10/20 (!) 133/47   01/09/20 (!) 154/69   01/06/20 130/66              Passed - Patient is at least 18 years old        Passed - Office visit in past 12 months or future 90 days     Recent Outpatient Visits            2 weeks ago Coronary artery disease of native artery of native heart with stable angina pectoris    [facility] Christos Monreal MD    1 month ago Generalized anxiety disorder    [facility] Kenneth Luu MD    1 month ago Acute inferior myocardial infarction    [facility] Karl Rico MD    4 months ago Hypertension associated with diabetes    [facility] Karl Rico MD    6 months ago Diabetic peripheral neuropathy associated with type 2 diabetes mellitus    [facility] Karl Rico MD          Future Appointments              In 3 days Christos Monreal MD [facility], [facility]    In 3 weeks Kenneth Luu MD [facility], [facility]                  
show

## 2021-05-28 ENCOUNTER — HOSPITAL ENCOUNTER (EMERGENCY)
Facility: HOSPITAL | Age: 71
Discharge: HOME | End: 2021-05-28
Attending: INTERNAL MEDICINE
Payer: MEDICARE

## 2021-05-28 VITALS
HEIGHT: 69 IN | RESPIRATION RATE: 20 BRPM | BODY MASS INDEX: 26.66 KG/M2 | OXYGEN SATURATION: 100 % | WEIGHT: 180 LBS | SYSTOLIC BLOOD PRESSURE: 191 MMHG | HEART RATE: 84 BPM | DIASTOLIC BLOOD PRESSURE: 85 MMHG | TEMPERATURE: 99 F
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
## 2021-05-28 DIAGNOSIS — S91.351A DOG BITE OF RIGHT FOOT, INITIAL ENCOUNTER: ICD-10-CM

## 2021-05-28 PROCEDURE — 99284 EMERGENCY DEPT VISIT MOD MDM: CPT | Mod: 25,ER

## 2021-05-28 PROCEDURE — 90471 IMMUNIZATION ADMIN: CPT | Mod: ER | Performed by: INTERNAL MEDICINE

## 2021-05-28 PROCEDURE — 63600175 PHARM REV CODE 636 W HCPCS: Mod: ER | Performed by: INTERNAL MEDICINE

## 2021-05-28 PROCEDURE — 25000003 PHARM REV CODE 250: Mod: ER | Performed by: INTERNAL MEDICINE

## 2021-05-28 PROCEDURE — 90715 TDAP VACCINE 7 YRS/> IM: CPT | Mod: ER | Performed by: INTERNAL MEDICINE

## 2021-05-28 RX ORDER — AMOXICILLIN AND CLAVULANATE POTASSIUM 875; 125 MG/1; MG/1
1 TABLET, FILM COATED ORAL 2 TIMES DAILY
Qty: 14 TABLET | Refills: 0 | Status: SHIPPED | OUTPATIENT
Start: 2021-05-28 | End: 2021-05-28 | Stop reason: SDUPTHER

## 2021-05-28 RX ORDER — IBUPROFEN 400 MG/1
800 TABLET ORAL
Status: COMPLETED | OUTPATIENT
Start: 2021-05-28 | End: 2021-05-28

## 2021-05-28 RX ORDER — AMOXICILLIN AND CLAVULANATE POTASSIUM 875; 125 MG/1; MG/1
1 TABLET, FILM COATED ORAL 2 TIMES DAILY
Qty: 14 TABLET | Refills: 0 | Status: ON HOLD | OUTPATIENT
Start: 2021-05-28 | End: 2021-06-01

## 2021-05-28 RX ORDER — AMOXICILLIN AND CLAVULANATE POTASSIUM 875; 125 MG/1; MG/1
1 TABLET, FILM COATED ORAL
Status: COMPLETED | OUTPATIENT
Start: 2021-05-28 | End: 2021-05-28

## 2021-05-28 RX ORDER — IBUPROFEN 800 MG/1
800 TABLET ORAL EVERY 8 HOURS PRN
Qty: 30 TABLET | Refills: 0 | Status: ON HOLD | OUTPATIENT
Start: 2021-05-28 | End: 2021-06-01 | Stop reason: HOSPADM

## 2021-05-28 RX ADMIN — IBUPROFEN 800 MG: 400 TABLET, FILM COATED ORAL at 10:05

## 2021-05-28 RX ADMIN — BACITRACIN, NEOMYCIN, POLYMYXIN B 1 EACH: 400; 3.5; 5 OINTMENT TOPICAL at 10:05

## 2021-05-28 RX ADMIN — CLOSTRIDIUM TETANI TOXOID ANTIGEN (FORMALDEHYDE INACTIVATED), CORYNEBACTERIUM DIPHTHERIAE TOXOID ANTIGEN (FORMALDEHYDE INACTIVATED), BORDETELLA PERTUSSIS TOXOID ANTIGEN (GLUTARALDEHYDE INACTIVATED), BORDETELLA PERTUSSIS FILAMENTOUS HEMAGGLUTININ ANTIGEN (FORMALDEHYDE INACTIVATED), BORDETELLA PERTUSSIS PERTACTIN ANTIGEN, AND BORDETELLA PERTUSSIS FIMBRIAE 2/3 ANTIGEN 0.5 ML: 5; 2; 2.5; 5; 3; 5 INJECTION, SUSPENSION INTRAMUSCULAR at 10:05

## 2021-05-28 RX ADMIN — AMOXICILLIN AND CLAVULANATE POTASSIUM 1 TABLET: 875; 125 TABLET, FILM COATED ORAL at 10:05

## 2021-06-01 ENCOUNTER — HOSPITAL ENCOUNTER (INPATIENT)
Facility: HOSPITAL | Age: 71
LOS: 4 days | Discharge: HOME | DRG: 479 | End: 2021-06-05
Attending: EMERGENCY MEDICINE | Admitting: EMERGENCY MEDICINE
Payer: MEDICARE
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
## 2021-06-01 DIAGNOSIS — L08.9 WOUND INFECTION: ICD-10-CM
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
## 2021-06-01 DIAGNOSIS — S91.351S: ICD-10-CM
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
## 2021-06-01 DIAGNOSIS — Z74.2 NEED FOR HOME HEALTH CARE: Primary | ICD-10-CM

## 2021-06-01 DIAGNOSIS — E11.51 PERIPHERAL VASCULAR DISEASE IN DIABETES MELLITUS: ICD-10-CM

## 2021-06-01 DIAGNOSIS — F41.9 ANXIETY: ICD-10-CM

## 2021-06-01 DIAGNOSIS — Z78.9 FAILURE OF OUTPATIENT TREATMENT: ICD-10-CM

## 2021-06-01 DIAGNOSIS — L03.031 CELLULITIS OF GREAT TOE, RIGHT: ICD-10-CM

## 2021-06-01 DIAGNOSIS — R07.9 CHEST PAIN: ICD-10-CM

## 2021-06-01 DIAGNOSIS — S91.351A DOG BITE OF RIGHT FOOT, INITIAL ENCOUNTER: ICD-10-CM

## 2021-06-01 DIAGNOSIS — T14.8XXA WOUND INFECTION: ICD-10-CM

## 2021-06-01 DIAGNOSIS — S91.351A: ICD-10-CM

## 2021-06-01 DIAGNOSIS — T14.90XA WOUNDS AND INJURIES: ICD-10-CM

## 2021-06-01 PROBLEM — D64.9 ANEMIA: Status: ACTIVE | Noted: 2021-06-01

## 2021-06-01 PROBLEM — E83.42 HYPOMAGNESEMIA: Status: RESOLVED | Noted: 2019-03-31 | Resolved: 2021-06-01

## 2021-06-01 LAB
ALBUMIN SERPL BCP-MCNC: 3.2 G/DL (ref 3.5–5.2)
ALP SERPL-CCNC: 132 U/L (ref 55–135)
ALT SERPL W/O P-5'-P-CCNC: 22 U/L (ref 10–44)
ANION GAP SERPL CALC-SCNC: 11 MMOL/L (ref 8–16)
AST SERPL-CCNC: 30 U/L (ref 10–40)
BASOPHILS # BLD AUTO: 0.13 K/UL (ref 0–0.2)
BASOPHILS NFR BLD: 0.8 % (ref 0–1.9)
BILIRUB SERPL-MCNC: 0.4 MG/DL (ref 0.1–1)
BUN SERPL-MCNC: 27 MG/DL (ref 8–23)
CALCIUM SERPL-MCNC: 9.5 MG/DL (ref 8.7–10.5)
CHLORIDE SERPL-SCNC: 102 MMOL/L (ref 95–110)
CO2 SERPL-SCNC: 24 MMOL/L (ref 23–29)
CREAT SERPL-MCNC: 1.3 MG/DL (ref 0.5–1.4)
CRP SERPL-MCNC: 14.8 MG/L (ref 0–8.2)
CTP QC/QA: YES
DIFFERENTIAL METHOD: ABNORMAL
EOSINOPHIL # BLD AUTO: 0.7 K/UL (ref 0–0.5)
EOSINOPHIL NFR BLD: 4.3 % (ref 0–8)
ERYTHROCYTE [DISTWIDTH] IN BLOOD BY AUTOMATED COUNT: 17.1 % (ref 11.5–14.5)
ERYTHROCYTE [SEDIMENTATION RATE] IN BLOOD BY WESTERGREN METHOD: 75 MM/HR (ref 0–20)
EST. GFR  (AFRICAN AMERICAN): 48 ML/MIN/1.73 M^2
EST. GFR  (NON AFRICAN AMERICAN): 42 ML/MIN/1.73 M^2
GLUCOSE SERPL-MCNC: 261 MG/DL (ref 70–110)
HCT VFR BLD AUTO: 30.1 % (ref 37–48.5)
HGB BLD-MCNC: 9.3 G/DL (ref 12–16)
IMM GRANULOCYTES # BLD AUTO: 0.1 K/UL (ref 0–0.04)
IMM GRANULOCYTES NFR BLD AUTO: 0.7 % (ref 0–0.5)
LACTATE SERPL-SCNC: 2.9 MMOL/L (ref 0.5–2.2)
LYMPHOCYTES # BLD AUTO: 1.5 K/UL (ref 1–4.8)
LYMPHOCYTES NFR BLD: 9.8 % (ref 18–48)
MCH RBC QN AUTO: 24.7 PG (ref 27–31)
MCHC RBC AUTO-ENTMCNC: 30.9 G/DL (ref 32–36)
MCV RBC AUTO: 80 FL (ref 82–98)
MONOCYTES # BLD AUTO: 0.9 K/UL (ref 0.3–1)
MONOCYTES NFR BLD: 5.9 % (ref 4–15)
NEUTROPHILS # BLD AUTO: 12 K/UL (ref 1.8–7.7)
NEUTROPHILS NFR BLD: 78.5 % (ref 38–73)
NRBC BLD-RTO: 0 /100 WBC
PLATELET # BLD AUTO: 293 K/UL (ref 150–450)
PMV BLD AUTO: 12.3 FL (ref 9.2–12.9)
POCT GLUCOSE: 191 MG/DL (ref 70–110)
POCT GLUCOSE: 214 MG/DL (ref 70–110)
POCT GLUCOSE: 241 MG/DL (ref 70–110)
POTASSIUM SERPL-SCNC: 4.4 MMOL/L (ref 3.5–5.1)
PROT SERPL-MCNC: 7.6 G/DL (ref 6–8.4)
RBC # BLD AUTO: 3.77 M/UL (ref 4–5.4)
SARS-COV-2 RDRP RESP QL NAA+PROBE: NEGATIVE
SODIUM SERPL-SCNC: 137 MMOL/L (ref 136–145)
WBC # BLD AUTO: 15.31 K/UL (ref 3.9–12.7)

## 2021-06-01 PROCEDURE — 96375 TX/PRO/DX INJ NEW DRUG ADDON: CPT

## 2021-06-01 PROCEDURE — 25000003 PHARM REV CODE 250: Performed by: HOSPITALIST

## 2021-06-01 PROCEDURE — 99223 1ST HOSP IP/OBS HIGH 75: CPT | Mod: ,,, | Performed by: SURGERY

## 2021-06-01 PROCEDURE — 96365 THER/PROPH/DIAG IV INF INIT: CPT

## 2021-06-01 PROCEDURE — A4216 STERILE WATER/SALINE, 10 ML: HCPCS | Performed by: HOSPITALIST

## 2021-06-01 PROCEDURE — 63600175 PHARM REV CODE 636 W HCPCS: Performed by: HOSPITALIST

## 2021-06-01 PROCEDURE — 25000003 PHARM REV CODE 250: Performed by: EMERGENCY MEDICINE

## 2021-06-01 PROCEDURE — C9399 UNCLASSIFIED DRUGS OR BIOLOG: HCPCS | Performed by: HOSPITALIST

## 2021-06-01 PROCEDURE — 99223 1ST HOSP IP/OBS HIGH 75: CPT | Mod: ,,, | Performed by: PODIATRIST

## 2021-06-01 PROCEDURE — 85652 RBC SED RATE AUTOMATED: CPT | Performed by: EMERGENCY MEDICINE

## 2021-06-01 PROCEDURE — U0002 COVID-19 LAB TEST NON-CDC: HCPCS | Performed by: EMERGENCY MEDICINE

## 2021-06-01 PROCEDURE — 99285 EMERGENCY DEPT VISIT HI MDM: CPT | Mod: 25

## 2021-06-01 PROCEDURE — 83036 HEMOGLOBIN GLYCOSYLATED A1C: CPT | Performed by: HOSPITALIST

## 2021-06-01 PROCEDURE — 63600175 PHARM REV CODE 636 W HCPCS: Performed by: EMERGENCY MEDICINE

## 2021-06-01 PROCEDURE — 99223 PR INITIAL HOSPITAL CARE,LEVL III: ICD-10-PCS | Mod: ,,, | Performed by: SURGERY

## 2021-06-01 PROCEDURE — 85025 COMPLETE CBC W/AUTO DIFF WBC: CPT | Performed by: EMERGENCY MEDICINE

## 2021-06-01 PROCEDURE — 83605 ASSAY OF LACTIC ACID: CPT | Performed by: EMERGENCY MEDICINE

## 2021-06-01 PROCEDURE — 86140 C-REACTIVE PROTEIN: CPT | Performed by: EMERGENCY MEDICINE

## 2021-06-01 PROCEDURE — 87040 BLOOD CULTURE FOR BACTERIA: CPT | Performed by: EMERGENCY MEDICINE

## 2021-06-01 PROCEDURE — 11000001 HC ACUTE MED/SURG PRIVATE ROOM

## 2021-06-01 PROCEDURE — 36415 COLL VENOUS BLD VENIPUNCTURE: CPT | Performed by: HOSPITALIST

## 2021-06-01 PROCEDURE — 99223 PR INITIAL HOSPITAL CARE,LEVL III: ICD-10-PCS | Mod: ,,, | Performed by: PODIATRIST

## 2021-06-01 PROCEDURE — 80053 COMPREHEN METABOLIC PANEL: CPT | Performed by: EMERGENCY MEDICINE

## 2021-06-01 RX ORDER — HYDROMORPHONE HYDROCHLORIDE 2 MG/ML
1 INJECTION, SOLUTION INTRAMUSCULAR; INTRAVENOUS; SUBCUTANEOUS
Status: COMPLETED | OUTPATIENT
Start: 2021-06-01 | End: 2021-06-01

## 2021-06-01 RX ORDER — NAPROXEN SODIUM 220 MG/1
81 TABLET, FILM COATED ORAL DAILY
Status: DISCONTINUED | OUTPATIENT
Start: 2021-06-02 | End: 2021-06-05 | Stop reason: HOSPADM

## 2021-06-01 RX ORDER — IBUPROFEN 200 MG
16 TABLET ORAL
Status: DISCONTINUED | OUTPATIENT
Start: 2021-06-01 | End: 2021-06-05 | Stop reason: HOSPADM

## 2021-06-01 RX ORDER — PANTOPRAZOLE SODIUM 40 MG/1
40 TABLET, DELAYED RELEASE ORAL DAILY
Status: DISCONTINUED | OUTPATIENT
Start: 2021-06-02 | End: 2021-06-05 | Stop reason: HOSPADM

## 2021-06-01 RX ORDER — FLUOXETINE HYDROCHLORIDE 20 MG/1
20 CAPSULE ORAL DAILY
Status: DISCONTINUED | OUTPATIENT
Start: 2021-06-01 | End: 2021-06-05 | Stop reason: HOSPADM

## 2021-06-01 RX ORDER — ATORVASTATIN CALCIUM 40 MG/1
80 TABLET, FILM COATED ORAL NIGHTLY
Status: DISCONTINUED | OUTPATIENT
Start: 2021-06-01 | End: 2021-06-05 | Stop reason: HOSPADM

## 2021-06-01 RX ORDER — ONDANSETRON 2 MG/ML
4 INJECTION INTRAMUSCULAR; INTRAVENOUS EVERY 8 HOURS PRN
Status: DISCONTINUED | OUTPATIENT
Start: 2021-06-01 | End: 2021-06-05 | Stop reason: HOSPADM

## 2021-06-01 RX ORDER — IBUPROFEN 200 MG
24 TABLET ORAL
Status: DISCONTINUED | OUTPATIENT
Start: 2021-06-01 | End: 2021-06-05 | Stop reason: HOSPADM

## 2021-06-01 RX ORDER — ACETAMINOPHEN 325 MG/1
650 TABLET ORAL EVERY 8 HOURS PRN
Status: DISCONTINUED | OUTPATIENT
Start: 2021-06-01 | End: 2021-06-05 | Stop reason: HOSPADM

## 2021-06-01 RX ORDER — AMLODIPINE BESYLATE 5 MG/1
10 TABLET ORAL DAILY
Status: DISCONTINUED | OUTPATIENT
Start: 2021-06-02 | End: 2021-06-05 | Stop reason: HOSPADM

## 2021-06-01 RX ORDER — ONDANSETRON 2 MG/ML
4 INJECTION INTRAMUSCULAR; INTRAVENOUS
Status: COMPLETED | OUTPATIENT
Start: 2021-06-01 | End: 2021-06-01

## 2021-06-01 RX ORDER — HYDROCODONE BITARTRATE AND ACETAMINOPHEN 5; 325 MG/1; MG/1
1 TABLET ORAL EVERY 6 HOURS PRN
Status: DISCONTINUED | OUTPATIENT
Start: 2021-06-01 | End: 2021-06-01

## 2021-06-01 RX ORDER — TALC
6 POWDER (GRAM) TOPICAL NIGHTLY PRN
Status: DISCONTINUED | OUTPATIENT
Start: 2021-06-01 | End: 2021-06-05 | Stop reason: HOSPADM

## 2021-06-01 RX ORDER — POLYETHYLENE GLYCOL 3350 17 G/17G
17 POWDER, FOR SOLUTION ORAL DAILY
Status: DISCONTINUED | OUTPATIENT
Start: 2021-06-02 | End: 2021-06-05 | Stop reason: HOSPADM

## 2021-06-01 RX ORDER — ENOXAPARIN SODIUM 100 MG/ML
40 INJECTION SUBCUTANEOUS EVERY 24 HOURS
Status: DISCONTINUED | OUTPATIENT
Start: 2021-06-02 | End: 2021-06-05 | Stop reason: HOSPADM

## 2021-06-01 RX ORDER — INSULIN ASPART 100 [IU]/ML
5 INJECTION, SOLUTION INTRAVENOUS; SUBCUTANEOUS
Status: DISCONTINUED | OUTPATIENT
Start: 2021-06-01 | End: 2021-06-04

## 2021-06-01 RX ORDER — HYDROCODONE BITARTRATE AND ACETAMINOPHEN 5; 325 MG/1; MG/1
1 TABLET ORAL ONCE
Status: COMPLETED | OUTPATIENT
Start: 2021-06-01 | End: 2021-06-01

## 2021-06-01 RX ORDER — INSULIN ASPART 100 [IU]/ML
0-5 INJECTION, SOLUTION INTRAVENOUS; SUBCUTANEOUS
Status: DISCONTINUED | OUTPATIENT
Start: 2021-06-01 | End: 2021-06-04

## 2021-06-01 RX ORDER — ATENOLOL 50 MG/1
50 TABLET ORAL 2 TIMES DAILY
Status: DISCONTINUED | OUTPATIENT
Start: 2021-06-01 | End: 2021-06-05 | Stop reason: HOSPADM

## 2021-06-01 RX ORDER — ISOSORBIDE MONONITRATE 30 MG/1
30 TABLET, EXTENDED RELEASE ORAL DAILY
Status: DISCONTINUED | OUTPATIENT
Start: 2021-06-02 | End: 2021-06-05 | Stop reason: HOSPADM

## 2021-06-01 RX ORDER — SODIUM CHLORIDE 0.9 % (FLUSH) 0.9 %
10 SYRINGE (ML) INJECTION EVERY 8 HOURS
Status: DISCONTINUED | OUTPATIENT
Start: 2021-06-01 | End: 2021-06-05 | Stop reason: HOSPADM

## 2021-06-01 RX ORDER — HYDROCHLOROTHIAZIDE 25 MG/1
25 TABLET ORAL DAILY
Status: DISCONTINUED | OUTPATIENT
Start: 2021-06-01 | End: 2021-06-05 | Stop reason: HOSPADM

## 2021-06-01 RX ORDER — GLUCAGON 1 MG
1 KIT INJECTION
Status: DISCONTINUED | OUTPATIENT
Start: 2021-06-01 | End: 2021-06-05 | Stop reason: HOSPADM

## 2021-06-01 RX ORDER — POLYETHYLENE GLYCOL 3350 17 G/17G
17 POWDER, FOR SOLUTION ORAL DAILY
Status: DISCONTINUED | OUTPATIENT
Start: 2021-06-01 | End: 2021-06-01

## 2021-06-01 RX ORDER — ACETAMINOPHEN 325 MG/1
650 TABLET ORAL EVERY 4 HOURS PRN
Status: DISCONTINUED | OUTPATIENT
Start: 2021-06-01 | End: 2021-06-05 | Stop reason: HOSPADM

## 2021-06-01 RX ADMIN — SODIUM CHLORIDE 3 G: 9 INJECTION, SOLUTION INTRAVENOUS at 10:06

## 2021-06-01 RX ADMIN — HYDROMORPHONE HYDROCHLORIDE 1 MG: 2 INJECTION INTRAMUSCULAR; INTRAVENOUS; SUBCUTANEOUS at 11:06

## 2021-06-01 RX ADMIN — TICAGRELOR 90 MG: 90 TABLET ORAL at 09:06

## 2021-06-01 RX ADMIN — HYDROCHLOROTHIAZIDE 25 MG: 25 TABLET ORAL at 03:06

## 2021-06-01 RX ADMIN — ATORVASTATIN CALCIUM 80 MG: 40 TABLET, FILM COATED ORAL at 09:06

## 2021-06-01 RX ADMIN — INSULIN DETEMIR 30 UNITS: 100 INJECTION, SOLUTION SUBCUTANEOUS at 09:06

## 2021-06-01 RX ADMIN — PIPERACILLIN AND TAZOBACTAM 4.5 G: 4; .5 INJECTION, POWDER, LYOPHILIZED, FOR SOLUTION INTRAVENOUS; PARENTERAL at 03:06

## 2021-06-01 RX ADMIN — Medication 10 ML: at 09:06

## 2021-06-01 RX ADMIN — INSULIN ASPART 5 UNITS: 100 INJECTION, SOLUTION INTRAVENOUS; SUBCUTANEOUS at 04:06

## 2021-06-01 RX ADMIN — VANCOMYCIN HYDROCHLORIDE 1250 MG: 1.25 INJECTION, POWDER, LYOPHILIZED, FOR SOLUTION INTRAVENOUS at 01:06

## 2021-06-01 RX ADMIN — PIPERACILLIN AND TAZOBACTAM 4.5 G: 4; .5 INJECTION, POWDER, LYOPHILIZED, FOR SOLUTION INTRAVENOUS; PARENTERAL at 09:06

## 2021-06-01 RX ADMIN — HYDROCODONE BITARTRATE AND ACETAMINOPHEN 1 TABLET: 5; 325 TABLET ORAL at 09:06

## 2021-06-01 RX ADMIN — INSULIN ASPART 2 UNITS: 100 INJECTION, SOLUTION INTRAVENOUS; SUBCUTANEOUS at 04:06

## 2021-06-01 RX ADMIN — ACETAMINOPHEN 650 MG: 325 TABLET ORAL at 03:06

## 2021-06-01 RX ADMIN — ATENOLOL 50 MG: 50 TABLET ORAL at 09:06

## 2021-06-01 RX ADMIN — ONDANSETRON 4 MG: 2 INJECTION INTRAMUSCULAR; INTRAVENOUS at 12:06

## 2021-06-01 RX ADMIN — Medication 10 ML: at 01:06

## 2021-06-01 RX ADMIN — FLUOXETINE 20 MG: 20 CAPSULE ORAL at 03:06

## 2021-06-02 ENCOUNTER — ANESTHESIA (OUTPATIENT)
Dept: SURGERY | Facility: HOSPITAL | Age: 71
DRG: 479 | End: 2021-06-02
Payer: MEDICARE

## 2021-06-02 ENCOUNTER — ANESTHESIA EVENT (OUTPATIENT)
Dept: SURGERY | Facility: HOSPITAL | Age: 71
DRG: 479 | End: 2021-06-02
Payer: MEDICARE

## 2021-06-02 PROBLEM — Z78.9 FAILURE OF OUTPATIENT TREATMENT: Status: ACTIVE | Noted: 2021-06-02

## 2021-06-02 LAB
ANION GAP SERPL CALC-SCNC: 9 MMOL/L (ref 8–16)
BASOPHILS # BLD AUTO: 0.09 K/UL (ref 0–0.2)
BASOPHILS NFR BLD: 0.8 % (ref 0–1.9)
BUN SERPL-MCNC: 22 MG/DL (ref 8–23)
CALCIUM SERPL-MCNC: 9.5 MG/DL (ref 8.7–10.5)
CHLORIDE SERPL-SCNC: 103 MMOL/L (ref 95–110)
CO2 SERPL-SCNC: 27 MMOL/L (ref 23–29)
CREAT SERPL-MCNC: 1.1 MG/DL (ref 0.5–1.4)
DIFFERENTIAL METHOD: ABNORMAL
EOSINOPHIL # BLD AUTO: 0.7 K/UL (ref 0–0.5)
EOSINOPHIL NFR BLD: 5.9 % (ref 0–8)
ERYTHROCYTE [DISTWIDTH] IN BLOOD BY AUTOMATED COUNT: 17.2 % (ref 11.5–14.5)
EST. GFR  (AFRICAN AMERICAN): 59 ML/MIN/1.73 M^2
EST. GFR  (NON AFRICAN AMERICAN): 51 ML/MIN/1.73 M^2
ESTIMATED AVG GLUCOSE: 269 MG/DL (ref 68–131)
FERRITIN SERPL-MCNC: 26 NG/ML (ref 20–300)
FOLATE SERPL-MCNC: 12.7 NG/ML (ref 4–24)
GLUCOSE SERPL-MCNC: 122 MG/DL (ref 70–110)
HBA1C MFR BLD: 11 % (ref 4–5.6)
HCT VFR BLD AUTO: 28.8 % (ref 37–48.5)
HGB BLD-MCNC: 8.7 G/DL (ref 12–16)
IMM GRANULOCYTES # BLD AUTO: 0.05 K/UL (ref 0–0.04)
IMM GRANULOCYTES NFR BLD AUTO: 0.4 % (ref 0–0.5)
IRON SERPL-MCNC: 64 UG/DL (ref 30–160)
LACTATE SERPL-SCNC: 1.5 MMOL/L (ref 0.5–2.2)
LYMPHOCYTES # BLD AUTO: 2 K/UL (ref 1–4.8)
LYMPHOCYTES NFR BLD: 16.8 % (ref 18–48)
MAGNESIUM SERPL-MCNC: 1.5 MG/DL (ref 1.6–2.6)
MCH RBC QN AUTO: 24.5 PG (ref 27–31)
MCHC RBC AUTO-ENTMCNC: 30.2 G/DL (ref 32–36)
MCV RBC AUTO: 81 FL (ref 82–98)
MONOCYTES # BLD AUTO: 0.9 K/UL (ref 0.3–1)
MONOCYTES NFR BLD: 7.8 % (ref 4–15)
NEUTROPHILS # BLD AUTO: 8.1 K/UL (ref 1.8–7.7)
NEUTROPHILS NFR BLD: 68.3 % (ref 38–73)
NRBC BLD-RTO: 0 /100 WBC
PHOSPHATE SERPL-MCNC: 3.8 MG/DL (ref 2.7–4.5)
PLATELET # BLD AUTO: 280 K/UL (ref 150–450)
PMV BLD AUTO: 12 FL (ref 9.2–12.9)
POCT GLUCOSE: 124 MG/DL (ref 70–110)
POCT GLUCOSE: 153 MG/DL (ref 70–110)
POCT GLUCOSE: 157 MG/DL (ref 70–110)
POCT GLUCOSE: 346 MG/DL (ref 70–110)
POTASSIUM SERPL-SCNC: 3.8 MMOL/L (ref 3.5–5.1)
RBC # BLD AUTO: 3.55 M/UL (ref 4–5.4)
SATURATED IRON: 16 % (ref 20–50)
SODIUM SERPL-SCNC: 139 MMOL/L (ref 136–145)
TOTAL IRON BINDING CAPACITY: 389 UG/DL (ref 250–450)
TRANSFERRIN SERPL-MCNC: 263 MG/DL (ref 200–375)
VIT B12 SERPL-MCNC: 858 PG/ML (ref 210–950)
WBC # BLD AUTO: 11.78 K/UL (ref 3.9–12.7)

## 2021-06-02 PROCEDURE — 36415 COLL VENOUS BLD VENIPUNCTURE: CPT | Performed by: HOSPITALIST

## 2021-06-02 PROCEDURE — 63600175 PHARM REV CODE 636 W HCPCS: Performed by: REGISTERED NURSE

## 2021-06-02 PROCEDURE — 88305 TISSUE EXAM BY PATHOLOGIST: CPT | Performed by: PATHOLOGY

## 2021-06-02 PROCEDURE — 83540 ASSAY OF IRON: CPT | Performed by: HOSPITALIST

## 2021-06-02 PROCEDURE — 36000705 HC OR TIME LEV I EA ADD 15 MIN: Performed by: PODIATRIST

## 2021-06-02 PROCEDURE — 25000003 PHARM REV CODE 250: Performed by: REGISTERED NURSE

## 2021-06-02 PROCEDURE — A4216 STERILE WATER/SALINE, 10 ML: HCPCS | Performed by: HOSPITALIST

## 2021-06-02 PROCEDURE — D9220A PRA ANESTHESIA: ICD-10-PCS | Mod: ANES,,, | Performed by: ANESTHESIOLOGY

## 2021-06-02 PROCEDURE — 87076 CULTURE ANAEROBE IDENT EACH: CPT | Performed by: HOSPITALIST

## 2021-06-02 PROCEDURE — 25500020 PHARM REV CODE 255: Performed by: HOSPITALIST

## 2021-06-02 PROCEDURE — D9220A PRA ANESTHESIA: Mod: CRNA,,, | Performed by: REGISTERED NURSE

## 2021-06-02 PROCEDURE — 88311 DECALCIFY TISSUE: CPT | Mod: 26,,, | Performed by: PATHOLOGY

## 2021-06-02 PROCEDURE — 80048 BASIC METABOLIC PNL TOTAL CA: CPT | Performed by: HOSPITALIST

## 2021-06-02 PROCEDURE — 63600175 PHARM REV CODE 636 W HCPCS: Performed by: HOSPITALIST

## 2021-06-02 PROCEDURE — 25000003 PHARM REV CODE 250: Performed by: HOSPITALIST

## 2021-06-02 PROCEDURE — 87116 MYCOBACTERIA CULTURE: CPT | Performed by: HOSPITALIST

## 2021-06-02 PROCEDURE — 88311 DECALCIFY TISSUE: CPT | Performed by: PATHOLOGY

## 2021-06-02 PROCEDURE — 87070 CULTURE OTHR SPECIMN AEROBIC: CPT | Performed by: HOSPITALIST

## 2021-06-02 PROCEDURE — 71000033 HC RECOVERY, INTIAL HOUR: Performed by: PODIATRIST

## 2021-06-02 PROCEDURE — 83735 ASSAY OF MAGNESIUM: CPT | Performed by: HOSPITALIST

## 2021-06-02 PROCEDURE — 99223 PR INITIAL HOSPITAL CARE,LEVL III: ICD-10-PCS | Mod: ,,, | Performed by: STUDENT IN AN ORGANIZED HEALTH CARE EDUCATION/TRAINING PROGRAM

## 2021-06-02 PROCEDURE — 87102 FUNGUS ISOLATION CULTURE: CPT | Performed by: HOSPITALIST

## 2021-06-02 PROCEDURE — D9220A PRA ANESTHESIA: ICD-10-PCS | Mod: CRNA,,, | Performed by: REGISTERED NURSE

## 2021-06-02 PROCEDURE — 82728 ASSAY OF FERRITIN: CPT | Performed by: HOSPITALIST

## 2021-06-02 PROCEDURE — 88305 TISSUE EXAM BY PATHOLOGIST: ICD-10-PCS | Mod: 26,,, | Performed by: PATHOLOGY

## 2021-06-02 PROCEDURE — 87205 SMEAR GRAM STAIN: CPT | Mod: 59 | Performed by: HOSPITALIST

## 2021-06-02 PROCEDURE — 37000009 HC ANESTHESIA EA ADD 15 MINS: Performed by: PODIATRIST

## 2021-06-02 PROCEDURE — 87186 SC STD MICRODIL/AGAR DIL: CPT | Performed by: HOSPITALIST

## 2021-06-02 PROCEDURE — 20220 PR BONE BIOPSY,TROCAR/NEEDLE SUPERF: ICD-10-PCS | Mod: 51,,, | Performed by: PODIATRIST

## 2021-06-02 PROCEDURE — 10061 I&D ABSCESS COMP/MULTIPLE: CPT | Mod: ,,, | Performed by: PODIATRIST

## 2021-06-02 PROCEDURE — 82746 ASSAY OF FOLIC ACID SERUM: CPT | Performed by: HOSPITALIST

## 2021-06-02 PROCEDURE — 82607 VITAMIN B-12: CPT | Performed by: HOSPITALIST

## 2021-06-02 PROCEDURE — 36000704 HC OR TIME LEV I 1ST 15 MIN: Performed by: PODIATRIST

## 2021-06-02 PROCEDURE — 83605 ASSAY OF LACTIC ACID: CPT | Performed by: HOSPITALIST

## 2021-06-02 PROCEDURE — 10061 PR DRAIN SKIN ABSCESS COMPLIC: ICD-10-PCS | Mod: ,,, | Performed by: PODIATRIST

## 2021-06-02 PROCEDURE — 99232 PR SUBSEQUENT HOSPITAL CARE,LEVL II: ICD-10-PCS | Mod: 25,,, | Performed by: PODIATRIST

## 2021-06-02 PROCEDURE — 87015 SPECIMEN INFECT AGNT CONCNTJ: CPT | Mod: 59 | Performed by: HOSPITALIST

## 2021-06-02 PROCEDURE — 99232 SBSQ HOSP IP/OBS MODERATE 35: CPT | Mod: 25,,, | Performed by: PODIATRIST

## 2021-06-02 PROCEDURE — 85025 COMPLETE CBC W/AUTO DIFF WBC: CPT | Performed by: HOSPITALIST

## 2021-06-02 PROCEDURE — 99223 1ST HOSP IP/OBS HIGH 75: CPT | Mod: ,,, | Performed by: STUDENT IN AN ORGANIZED HEALTH CARE EDUCATION/TRAINING PROGRAM

## 2021-06-02 PROCEDURE — 88305 TISSUE EXAM BY PATHOLOGIST: CPT | Mod: 26,,, | Performed by: PATHOLOGY

## 2021-06-02 PROCEDURE — 87077 CULTURE AEROBIC IDENTIFY: CPT | Performed by: HOSPITALIST

## 2021-06-02 PROCEDURE — 87075 CULTR BACTERIA EXCEPT BLOOD: CPT | Mod: 59 | Performed by: HOSPITALIST

## 2021-06-02 PROCEDURE — 87206 SMEAR FLUORESCENT/ACID STAI: CPT | Mod: 91 | Performed by: HOSPITALIST

## 2021-06-02 PROCEDURE — 11000001 HC ACUTE MED/SURG PRIVATE ROOM

## 2021-06-02 PROCEDURE — 84100 ASSAY OF PHOSPHORUS: CPT | Performed by: HOSPITALIST

## 2021-06-02 PROCEDURE — 37000008 HC ANESTHESIA 1ST 15 MINUTES: Performed by: PODIATRIST

## 2021-06-02 PROCEDURE — 25000003 PHARM REV CODE 250: Performed by: STUDENT IN AN ORGANIZED HEALTH CARE EDUCATION/TRAINING PROGRAM

## 2021-06-02 PROCEDURE — 88311 PR  DECALCIFY TISSUE: ICD-10-PCS | Mod: 26,,, | Performed by: PATHOLOGY

## 2021-06-02 PROCEDURE — 20220 BONE BIOPSY TROCAR/NDL SUPFC: CPT | Mod: 51,,, | Performed by: PODIATRIST

## 2021-06-02 PROCEDURE — D9220A PRA ANESTHESIA: Mod: ANES,,, | Performed by: ANESTHESIOLOGY

## 2021-06-02 PROCEDURE — A9585 GADOBUTROL INJECTION: HCPCS | Performed by: HOSPITALIST

## 2021-06-02 PROCEDURE — 63600175 PHARM REV CODE 636 W HCPCS: Performed by: STUDENT IN AN ORGANIZED HEALTH CARE EDUCATION/TRAINING PROGRAM

## 2021-06-02 RX ORDER — SODIUM CHLORIDE 0.9 % (FLUSH) 0.9 %
3 SYRINGE (ML) INJECTION
Status: DISCONTINUED | OUTPATIENT
Start: 2021-06-02 | End: 2021-06-02 | Stop reason: HOSPADM

## 2021-06-02 RX ORDER — FENTANYL CITRATE 50 UG/ML
25 INJECTION, SOLUTION INTRAMUSCULAR; INTRAVENOUS EVERY 5 MIN PRN
Status: DISCONTINUED | OUTPATIENT
Start: 2021-06-02 | End: 2021-06-02 | Stop reason: HOSPADM

## 2021-06-02 RX ORDER — HYDROMORPHONE HYDROCHLORIDE 2 MG/ML
0.2 INJECTION, SOLUTION INTRAMUSCULAR; INTRAVENOUS; SUBCUTANEOUS EVERY 5 MIN PRN
Status: DISCONTINUED | OUTPATIENT
Start: 2021-06-02 | End: 2021-06-02 | Stop reason: SDUPTHER

## 2021-06-02 RX ORDER — GADOBUTROL 604.72 MG/ML
8 INJECTION INTRAVENOUS
Status: COMPLETED | OUTPATIENT
Start: 2021-06-02 | End: 2021-06-02

## 2021-06-02 RX ORDER — GABAPENTIN 100 MG/1
100 CAPSULE ORAL 2 TIMES DAILY
Status: DISCONTINUED | OUTPATIENT
Start: 2021-06-02 | End: 2021-06-05

## 2021-06-02 RX ORDER — PROPOFOL 10 MG/ML
VIAL (ML) INTRAVENOUS
Status: DISCONTINUED | OUTPATIENT
Start: 2021-06-02 | End: 2021-06-02

## 2021-06-02 RX ORDER — MAGNESIUM SULFATE HEPTAHYDRATE 40 MG/ML
2 INJECTION, SOLUTION INTRAVENOUS ONCE
Status: COMPLETED | OUTPATIENT
Start: 2021-06-02 | End: 2021-06-02

## 2021-06-02 RX ORDER — PHENYLEPHRINE HYDROCHLORIDE 10 MG/ML
INJECTION INTRAVENOUS
Status: DISCONTINUED | OUTPATIENT
Start: 2021-06-02 | End: 2021-06-02

## 2021-06-02 RX ORDER — FERROUS SULFATE 325(65) MG
325 TABLET, DELAYED RELEASE (ENTERIC COATED) ORAL DAILY
Status: DISCONTINUED | OUTPATIENT
Start: 2021-06-03 | End: 2021-06-05 | Stop reason: HOSPADM

## 2021-06-02 RX ORDER — FENTANYL CITRATE 50 UG/ML
25 INJECTION, SOLUTION INTRAMUSCULAR; INTRAVENOUS EVERY 5 MIN PRN
Status: DISCONTINUED | OUTPATIENT
Start: 2021-06-02 | End: 2021-06-02 | Stop reason: SDUPTHER

## 2021-06-02 RX ORDER — HYDROMORPHONE HYDROCHLORIDE 2 MG/ML
0.2 INJECTION, SOLUTION INTRAMUSCULAR; INTRAVENOUS; SUBCUTANEOUS EVERY 5 MIN PRN
Status: DISCONTINUED | OUTPATIENT
Start: 2021-06-02 | End: 2021-06-02 | Stop reason: HOSPADM

## 2021-06-02 RX ORDER — ONDANSETRON 2 MG/ML
INJECTION INTRAMUSCULAR; INTRAVENOUS
Status: DISCONTINUED | OUTPATIENT
Start: 2021-06-02 | End: 2021-06-02

## 2021-06-02 RX ORDER — LIDOCAINE HYDROCHLORIDE 20 MG/ML
INJECTION INTRAVENOUS
Status: DISCONTINUED | OUTPATIENT
Start: 2021-06-02 | End: 2021-06-02

## 2021-06-02 RX ORDER — FENTANYL CITRATE 50 UG/ML
INJECTION, SOLUTION INTRAMUSCULAR; INTRAVENOUS
Status: DISCONTINUED | OUTPATIENT
Start: 2021-06-02 | End: 2021-06-02

## 2021-06-02 RX ORDER — HYDROCODONE BITARTRATE AND ACETAMINOPHEN 5; 325 MG/1; MG/1
1 TABLET ORAL EVERY 6 HOURS PRN
Status: DISCONTINUED | OUTPATIENT
Start: 2021-06-02 | End: 2021-06-03

## 2021-06-02 RX ADMIN — AMPICILLIN AND SULBACTAM 3 G: 2; 1 INJECTION, POWDER, FOR SOLUTION INTRAMUSCULAR; INTRAVENOUS at 01:06

## 2021-06-02 RX ADMIN — ATORVASTATIN CALCIUM 80 MG: 40 TABLET, FILM COATED ORAL at 09:06

## 2021-06-02 RX ADMIN — AMLODIPINE BESYLATE 10 MG: 5 TABLET ORAL at 08:06

## 2021-06-02 RX ADMIN — Medication 10 ML: at 11:06

## 2021-06-02 RX ADMIN — FENTANYL CITRATE 25 MCG: 50 INJECTION, SOLUTION INTRAMUSCULAR; INTRAVENOUS at 02:06

## 2021-06-02 RX ADMIN — ATENOLOL 50 MG: 50 TABLET ORAL at 08:06

## 2021-06-02 RX ADMIN — ACETAMINOPHEN 650 MG: 325 TABLET ORAL at 07:06

## 2021-06-02 RX ADMIN — FLUOXETINE 20 MG: 20 CAPSULE ORAL at 08:06

## 2021-06-02 RX ADMIN — ENOXAPARIN SODIUM 40 MG: 40 INJECTION SUBCUTANEOUS at 04:06

## 2021-06-02 RX ADMIN — PROPOFOL 30 MG: 10 INJECTION, EMULSION INTRAVENOUS at 03:06

## 2021-06-02 RX ADMIN — GLYCOPYRROLATE 0.2 MG: 0.2 INJECTION, SOLUTION INTRAMUSCULAR; INTRAVITREAL at 03:06

## 2021-06-02 RX ADMIN — PHENYLEPHRINE HYDROCHLORIDE 100 MCG: 10 INJECTION INTRAVENOUS at 02:06

## 2021-06-02 RX ADMIN — Medication 100 MG: at 02:06

## 2021-06-02 RX ADMIN — HYDROCODONE BITARTRATE AND ACETAMINOPHEN 1 TABLET: 5; 325 TABLET ORAL at 09:06

## 2021-06-02 RX ADMIN — ONDANSETRON 4 MG: 2 INJECTION, SOLUTION INTRAMUSCULAR; INTRAVENOUS at 02:06

## 2021-06-02 RX ADMIN — INSULIN ASPART 2 UNITS: 100 INJECTION, SOLUTION INTRAVENOUS; SUBCUTANEOUS at 09:06

## 2021-06-02 RX ADMIN — PROPOFOL 50 MG: 10 INJECTION, EMULSION INTRAVENOUS at 02:06

## 2021-06-02 RX ADMIN — INSULIN ASPART 5 UNITS: 100 INJECTION, SOLUTION INTRAVENOUS; SUBCUTANEOUS at 06:06

## 2021-06-02 RX ADMIN — PHENYLEPHRINE HYDROCHLORIDE 100 MCG: 10 INJECTION INTRAVENOUS at 03:06

## 2021-06-02 RX ADMIN — ATENOLOL 50 MG: 50 TABLET ORAL at 09:06

## 2021-06-02 RX ADMIN — VANCOMYCIN HYDROCHLORIDE 1250 MG: 1.25 INJECTION, POWDER, LYOPHILIZED, FOR SOLUTION INTRAVENOUS at 02:06

## 2021-06-02 RX ADMIN — MAGNESIUM SULFATE HEPTAHYDRATE 2 G: 40 INJECTION, SOLUTION INTRAVENOUS at 08:06

## 2021-06-02 RX ADMIN — ISOSORBIDE MONONITRATE 30 MG: 30 TABLET, EXTENDED RELEASE ORAL at 09:06

## 2021-06-02 RX ADMIN — FENTANYL CITRATE 50 MCG: 50 INJECTION, SOLUTION INTRAMUSCULAR; INTRAVENOUS at 02:06

## 2021-06-02 RX ADMIN — GABAPENTIN 100 MG: 100 CAPSULE ORAL at 08:06

## 2021-06-02 RX ADMIN — Medication 10 ML: at 05:06

## 2021-06-02 RX ADMIN — AMPICILLIN AND SULBACTAM 3 G: 2; 1 INJECTION, POWDER, FOR SOLUTION INTRAMUSCULAR; INTRAVENOUS at 04:06

## 2021-06-02 RX ADMIN — PROPOFOL 30 MG: 10 INJECTION, EMULSION INTRAVENOUS at 02:06

## 2021-06-02 RX ADMIN — HYDROCHLOROTHIAZIDE 25 MG: 25 TABLET ORAL at 08:06

## 2021-06-02 RX ADMIN — PANTOPRAZOLE SODIUM 40 MG: 40 TABLET, DELAYED RELEASE ORAL at 08:06

## 2021-06-02 RX ADMIN — PIPERACILLIN AND TAZOBACTAM 4.5 G: 4; .5 INJECTION, POWDER, LYOPHILIZED, FOR SOLUTION INTRAVENOUS; PARENTERAL at 05:06

## 2021-06-02 RX ADMIN — GADOBUTROL 8 ML: 604.72 INJECTION INTRAVENOUS at 10:06

## 2021-06-02 RX ADMIN — AMPICILLIN AND SULBACTAM 3 G: 2; 1 INJECTION, POWDER, FOR SOLUTION INTRAMUSCULAR; INTRAVENOUS at 11:06

## 2021-06-02 RX ADMIN — GABAPENTIN 100 MG: 100 CAPSULE ORAL at 09:06

## 2021-06-02 RX ADMIN — TICAGRELOR 90 MG: 90 TABLET ORAL at 09:06

## 2021-06-02 RX ADMIN — GABAPENTIN 100 MG: 100 CAPSULE ORAL at 01:06

## 2021-06-02 RX ADMIN — INSULIN DETEMIR 30 UNITS: 100 INJECTION, SOLUTION SUBCUTANEOUS at 09:06

## 2021-06-02 RX ADMIN — Medication 10 ML: at 04:06

## 2021-06-02 RX ADMIN — ASPIRIN 81 MG CHEWABLE TABLET 81 MG: 81 TABLET CHEWABLE at 08:06

## 2021-06-03 LAB
ANION GAP SERPL CALC-SCNC: 12 MMOL/L (ref 8–16)
BASOPHILS # BLD AUTO: 0.09 K/UL (ref 0–0.2)
BASOPHILS NFR BLD: 0.7 % (ref 0–1.9)
BUN SERPL-MCNC: 19 MG/DL (ref 8–23)
CALCIUM SERPL-MCNC: 9.3 MG/DL (ref 8.7–10.5)
CHLORIDE SERPL-SCNC: 101 MMOL/L (ref 95–110)
CO2 SERPL-SCNC: 23 MMOL/L (ref 23–29)
CREAT SERPL-MCNC: 1.4 MG/DL (ref 0.5–1.4)
DIFFERENTIAL METHOD: ABNORMAL
EOSINOPHIL # BLD AUTO: 0.4 K/UL (ref 0–0.5)
EOSINOPHIL NFR BLD: 3.4 % (ref 0–8)
ERYTHROCYTE [DISTWIDTH] IN BLOOD BY AUTOMATED COUNT: 17.2 % (ref 11.5–14.5)
EST. GFR  (AFRICAN AMERICAN): 44 ML/MIN/1.73 M^2
EST. GFR  (NON AFRICAN AMERICAN): 38 ML/MIN/1.73 M^2
GLUCOSE SERPL-MCNC: 249 MG/DL (ref 70–110)
GRAM STN SPEC: NORMAL
HCT VFR BLD AUTO: 27.8 % (ref 37–48.5)
HGB BLD-MCNC: 8.5 G/DL (ref 12–16)
IMM GRANULOCYTES # BLD AUTO: 0.08 K/UL (ref 0–0.04)
IMM GRANULOCYTES NFR BLD AUTO: 0.7 % (ref 0–0.5)
LYMPHOCYTES # BLD AUTO: 1.8 K/UL (ref 1–4.8)
LYMPHOCYTES NFR BLD: 14.5 % (ref 18–48)
MAGNESIUM SERPL-MCNC: 1.8 MG/DL (ref 1.6–2.6)
MCH RBC QN AUTO: 25.1 PG (ref 27–31)
MCHC RBC AUTO-ENTMCNC: 30.6 G/DL (ref 32–36)
MCV RBC AUTO: 82 FL (ref 82–98)
MONOCYTES # BLD AUTO: 1 K/UL (ref 0.3–1)
MONOCYTES NFR BLD: 8.5 % (ref 4–15)
NEUTROPHILS # BLD AUTO: 8.8 K/UL (ref 1.8–7.7)
NEUTROPHILS NFR BLD: 72.2 % (ref 38–73)
NRBC BLD-RTO: 0 /100 WBC
PLATELET # BLD AUTO: 278 K/UL (ref 150–450)
PMV BLD AUTO: 12.2 FL (ref 9.2–12.9)
POCT GLUCOSE: 179 MG/DL (ref 70–110)
POCT GLUCOSE: 237 MG/DL (ref 70–110)
POCT GLUCOSE: 249 MG/DL (ref 70–110)
POCT GLUCOSE: 255 MG/DL (ref 70–110)
POTASSIUM SERPL-SCNC: 3.8 MMOL/L (ref 3.5–5.1)
RBC # BLD AUTO: 3.39 M/UL (ref 4–5.4)
SODIUM SERPL-SCNC: 136 MMOL/L (ref 136–145)
VANCOMYCIN TROUGH SERPL-MCNC: 10.8 UG/ML (ref 10–22)
WBC # BLD AUTO: 12.16 K/UL (ref 3.9–12.7)

## 2021-06-03 PROCEDURE — A4216 STERILE WATER/SALINE, 10 ML: HCPCS | Performed by: HOSPITALIST

## 2021-06-03 PROCEDURE — 36415 COLL VENOUS BLD VENIPUNCTURE: CPT | Performed by: HOSPITALIST

## 2021-06-03 PROCEDURE — 97165 OT EVAL LOW COMPLEX 30 MIN: CPT

## 2021-06-03 PROCEDURE — 63600175 PHARM REV CODE 636 W HCPCS: Performed by: STUDENT IN AN ORGANIZED HEALTH CARE EDUCATION/TRAINING PROGRAM

## 2021-06-03 PROCEDURE — 99233 SBSQ HOSP IP/OBS HIGH 50: CPT | Mod: ,,, | Performed by: STUDENT IN AN ORGANIZED HEALTH CARE EDUCATION/TRAINING PROGRAM

## 2021-06-03 PROCEDURE — 99024 POSTOP FOLLOW-UP VISIT: CPT | Mod: ,,, | Performed by: PODIATRIST

## 2021-06-03 PROCEDURE — 99024 PR POST-OP FOLLOW-UP VISIT: ICD-10-PCS | Mod: ,,, | Performed by: PODIATRIST

## 2021-06-03 PROCEDURE — 99233 PR SUBSEQUENT HOSPITAL CARE,LEVL III: ICD-10-PCS | Mod: ,,, | Performed by: STUDENT IN AN ORGANIZED HEALTH CARE EDUCATION/TRAINING PROGRAM

## 2021-06-03 PROCEDURE — 80048 BASIC METABOLIC PNL TOTAL CA: CPT | Performed by: HOSPITALIST

## 2021-06-03 PROCEDURE — 63600175 PHARM REV CODE 636 W HCPCS: Performed by: HOSPITALIST

## 2021-06-03 PROCEDURE — 25000003 PHARM REV CODE 250: Performed by: STUDENT IN AN ORGANIZED HEALTH CARE EDUCATION/TRAINING PROGRAM

## 2021-06-03 PROCEDURE — 25000003 PHARM REV CODE 250: Performed by: HOSPITALIST

## 2021-06-03 PROCEDURE — 97161 PT EVAL LOW COMPLEX 20 MIN: CPT

## 2021-06-03 PROCEDURE — 97116 GAIT TRAINING THERAPY: CPT

## 2021-06-03 PROCEDURE — 83735 ASSAY OF MAGNESIUM: CPT | Performed by: HOSPITALIST

## 2021-06-03 PROCEDURE — 85025 COMPLETE CBC W/AUTO DIFF WBC: CPT | Performed by: HOSPITALIST

## 2021-06-03 PROCEDURE — 80202 ASSAY OF VANCOMYCIN: CPT | Performed by: HOSPITALIST

## 2021-06-03 PROCEDURE — 11000001 HC ACUTE MED/SURG PRIVATE ROOM

## 2021-06-03 RX ORDER — HYDROCODONE BITARTRATE AND ACETAMINOPHEN 5; 325 MG/1; MG/1
1 TABLET ORAL EVERY 4 HOURS PRN
Status: DISCONTINUED | OUTPATIENT
Start: 2021-06-03 | End: 2021-06-05 | Stop reason: HOSPADM

## 2021-06-03 RX ADMIN — AMPICILLIN AND SULBACTAM 3 G: 2; 1 INJECTION, POWDER, FOR SOLUTION INTRAMUSCULAR; INTRAVENOUS at 04:06

## 2021-06-03 RX ADMIN — VANCOMYCIN HYDROCHLORIDE 1250 MG: 1.25 INJECTION, POWDER, LYOPHILIZED, FOR SOLUTION INTRAVENOUS at 12:06

## 2021-06-03 RX ADMIN — INSULIN ASPART 5 UNITS: 100 INJECTION, SOLUTION INTRAVENOUS; SUBCUTANEOUS at 12:06

## 2021-06-03 RX ADMIN — INSULIN ASPART 5 UNITS: 100 INJECTION, SOLUTION INTRAVENOUS; SUBCUTANEOUS at 05:06

## 2021-06-03 RX ADMIN — Medication 10 ML: at 08:06

## 2021-06-03 RX ADMIN — HYDROCHLOROTHIAZIDE 25 MG: 25 TABLET ORAL at 08:06

## 2021-06-03 RX ADMIN — ATENOLOL 50 MG: 50 TABLET ORAL at 08:06

## 2021-06-03 RX ADMIN — AMPICILLIN AND SULBACTAM 3 G: 2; 1 INJECTION, POWDER, FOR SOLUTION INTRAMUSCULAR; INTRAVENOUS at 11:06

## 2021-06-03 RX ADMIN — ATORVASTATIN CALCIUM 80 MG: 40 TABLET, FILM COATED ORAL at 08:06

## 2021-06-03 RX ADMIN — HYDROCODONE BITARTRATE AND ACETAMINOPHEN 1 TABLET: 5; 325 TABLET ORAL at 03:06

## 2021-06-03 RX ADMIN — HYDROCODONE BITARTRATE AND ACETAMINOPHEN 1 TABLET: 5; 325 TABLET ORAL at 11:06

## 2021-06-03 RX ADMIN — Medication 325 MG: at 08:06

## 2021-06-03 RX ADMIN — Medication 10 ML: at 11:06

## 2021-06-03 RX ADMIN — AMLODIPINE BESYLATE 10 MG: 5 TABLET ORAL at 08:06

## 2021-06-03 RX ADMIN — GABAPENTIN 100 MG: 100 CAPSULE ORAL at 08:06

## 2021-06-03 RX ADMIN — TICAGRELOR 90 MG: 90 TABLET ORAL at 08:06

## 2021-06-03 RX ADMIN — INSULIN ASPART 2 UNITS: 100 INJECTION, SOLUTION INTRAVENOUS; SUBCUTANEOUS at 05:06

## 2021-06-03 RX ADMIN — INSULIN DETEMIR 30 UNITS: 100 INJECTION, SOLUTION SUBCUTANEOUS at 08:06

## 2021-06-03 RX ADMIN — AMPICILLIN AND SULBACTAM 3 G: 2; 1 INJECTION, POWDER, FOR SOLUTION INTRAMUSCULAR; INTRAVENOUS at 05:06

## 2021-06-03 RX ADMIN — PANTOPRAZOLE SODIUM 40 MG: 40 TABLET, DELAYED RELEASE ORAL at 08:06

## 2021-06-03 RX ADMIN — ISOSORBIDE MONONITRATE 30 MG: 30 TABLET, EXTENDED RELEASE ORAL at 08:06

## 2021-06-03 RX ADMIN — FLUOXETINE 20 MG: 20 CAPSULE ORAL at 08:06

## 2021-06-03 RX ADMIN — POLYETHYLENE GLYCOL 3350 17 G: 17 POWDER, FOR SOLUTION ORAL at 08:06

## 2021-06-03 RX ADMIN — ENOXAPARIN SODIUM 40 MG: 40 INJECTION SUBCUTANEOUS at 04:06

## 2021-06-03 RX ADMIN — HYDROCODONE BITARTRATE AND ACETAMINOPHEN 1 TABLET: 5; 325 TABLET ORAL at 08:06

## 2021-06-03 RX ADMIN — HYDROCODONE BITARTRATE AND ACETAMINOPHEN 1 TABLET: 5; 325 TABLET ORAL at 04:06

## 2021-06-03 RX ADMIN — INSULIN ASPART 2 UNITS: 100 INJECTION, SOLUTION INTRAVENOUS; SUBCUTANEOUS at 08:06

## 2021-06-03 RX ADMIN — ASPIRIN 81 MG CHEWABLE TABLET 81 MG: 81 TABLET CHEWABLE at 08:06

## 2021-06-03 RX ADMIN — INSULIN ASPART 5 UNITS: 100 INJECTION, SOLUTION INTRAVENOUS; SUBCUTANEOUS at 08:06

## 2021-06-04 LAB
ANION GAP SERPL CALC-SCNC: 8 MMOL/L (ref 8–16)
BACTERIA SPEC AEROBE CULT: NORMAL
BASOPHILS # BLD AUTO: 0.08 K/UL (ref 0–0.2)
BASOPHILS NFR BLD: 1 % (ref 0–1.9)
BUN SERPL-MCNC: 17 MG/DL (ref 8–23)
CALCIUM SERPL-MCNC: 9.1 MG/DL (ref 8.7–10.5)
CHLORIDE SERPL-SCNC: 101 MMOL/L (ref 95–110)
CO2 SERPL-SCNC: 27 MMOL/L (ref 23–29)
CREAT SERPL-MCNC: 1.1 MG/DL (ref 0.5–1.4)
DIFFERENTIAL METHOD: ABNORMAL
EOSINOPHIL # BLD AUTO: 0.4 K/UL (ref 0–0.5)
EOSINOPHIL NFR BLD: 5.3 % (ref 0–8)
ERYTHROCYTE [DISTWIDTH] IN BLOOD BY AUTOMATED COUNT: 17 % (ref 11.5–14.5)
EST. GFR  (AFRICAN AMERICAN): 59 ML/MIN/1.73 M^2
EST. GFR  (NON AFRICAN AMERICAN): 51 ML/MIN/1.73 M^2
GLUCOSE SERPL-MCNC: 203 MG/DL (ref 70–110)
HCT VFR BLD AUTO: 25.5 % (ref 37–48.5)
HGB BLD-MCNC: 7.9 G/DL (ref 12–16)
IMM GRANULOCYTES # BLD AUTO: 0.06 K/UL (ref 0–0.04)
IMM GRANULOCYTES NFR BLD AUTO: 0.7 % (ref 0–0.5)
LYMPHOCYTES # BLD AUTO: 1.9 K/UL (ref 1–4.8)
LYMPHOCYTES NFR BLD: 22.5 % (ref 18–48)
MAGNESIUM SERPL-MCNC: 1.6 MG/DL (ref 1.6–2.6)
MCH RBC QN AUTO: 24.9 PG (ref 27–31)
MCHC RBC AUTO-ENTMCNC: 31 G/DL (ref 32–36)
MCV RBC AUTO: 80 FL (ref 82–98)
MONOCYTES # BLD AUTO: 0.8 K/UL (ref 0.3–1)
MONOCYTES NFR BLD: 9.8 % (ref 4–15)
NEUTROPHILS # BLD AUTO: 5.1 K/UL (ref 1.8–7.7)
NEUTROPHILS NFR BLD: 60.7 % (ref 38–73)
NRBC BLD-RTO: 0 /100 WBC
PLATELET # BLD AUTO: 226 K/UL (ref 150–450)
PMV BLD AUTO: 12.1 FL (ref 9.2–12.9)
POCT GLUCOSE: 192 MG/DL (ref 70–110)
POCT GLUCOSE: 204 MG/DL (ref 70–110)
POCT GLUCOSE: 270 MG/DL (ref 70–110)
POCT GLUCOSE: 336 MG/DL (ref 70–110)
POTASSIUM SERPL-SCNC: 3.7 MMOL/L (ref 3.5–5.1)
RBC # BLD AUTO: 3.17 M/UL (ref 4–5.4)
SODIUM SERPL-SCNC: 136 MMOL/L (ref 136–145)
WBC # BLD AUTO: 8.37 K/UL (ref 3.9–12.7)

## 2021-06-04 PROCEDURE — 36569 INSJ PICC 5 YR+ W/O IMAGING: CPT

## 2021-06-04 PROCEDURE — 99233 SBSQ HOSP IP/OBS HIGH 50: CPT | Mod: ,,, | Performed by: STUDENT IN AN ORGANIZED HEALTH CARE EDUCATION/TRAINING PROGRAM

## 2021-06-04 PROCEDURE — C1751 CATH, INF, PER/CENT/MIDLINE: HCPCS

## 2021-06-04 PROCEDURE — A4216 STERILE WATER/SALINE, 10 ML: HCPCS | Performed by: HOSPITALIST

## 2021-06-04 PROCEDURE — 25000003 PHARM REV CODE 250: Performed by: STUDENT IN AN ORGANIZED HEALTH CARE EDUCATION/TRAINING PROGRAM

## 2021-06-04 PROCEDURE — 36415 COLL VENOUS BLD VENIPUNCTURE: CPT | Performed by: HOSPITALIST

## 2021-06-04 PROCEDURE — 63600175 PHARM REV CODE 636 W HCPCS: Performed by: HOSPITALIST

## 2021-06-04 PROCEDURE — 99024 POSTOP FOLLOW-UP VISIT: CPT | Mod: ,,, | Performed by: PODIATRIST

## 2021-06-04 PROCEDURE — 63600175 PHARM REV CODE 636 W HCPCS: Performed by: STUDENT IN AN ORGANIZED HEALTH CARE EDUCATION/TRAINING PROGRAM

## 2021-06-04 PROCEDURE — 25000003 PHARM REV CODE 250: Performed by: HOSPITALIST

## 2021-06-04 PROCEDURE — 85025 COMPLETE CBC W/AUTO DIFF WBC: CPT | Performed by: HOSPITALIST

## 2021-06-04 PROCEDURE — 80048 BASIC METABOLIC PNL TOTAL CA: CPT | Performed by: HOSPITALIST

## 2021-06-04 PROCEDURE — 99233 PR SUBSEQUENT HOSPITAL CARE,LEVL III: ICD-10-PCS | Mod: ,,, | Performed by: STUDENT IN AN ORGANIZED HEALTH CARE EDUCATION/TRAINING PROGRAM

## 2021-06-04 PROCEDURE — 99024 PR POST-OP FOLLOW-UP VISIT: ICD-10-PCS | Mod: ,,, | Performed by: PODIATRIST

## 2021-06-04 PROCEDURE — 83735 ASSAY OF MAGNESIUM: CPT | Performed by: HOSPITALIST

## 2021-06-04 PROCEDURE — 11000001 HC ACUTE MED/SURG PRIVATE ROOM

## 2021-06-04 RX ORDER — INSULIN ASPART 100 [IU]/ML
1-10 INJECTION, SOLUTION INTRAVENOUS; SUBCUTANEOUS
Status: DISCONTINUED | OUTPATIENT
Start: 2021-06-04 | End: 2021-06-05 | Stop reason: HOSPADM

## 2021-06-04 RX ORDER — INSULIN ASPART 100 [IU]/ML
8 INJECTION, SOLUTION INTRAVENOUS; SUBCUTANEOUS
Status: DISCONTINUED | OUTPATIENT
Start: 2021-06-04 | End: 2021-06-05 | Stop reason: HOSPADM

## 2021-06-04 RX ORDER — SODIUM CHLORIDE 0.9 % (FLUSH) 0.9 %
10 SYRINGE (ML) INJECTION EVERY 6 HOURS
Status: DISCONTINUED | OUTPATIENT
Start: 2021-06-04 | End: 2021-06-05 | Stop reason: HOSPADM

## 2021-06-04 RX ORDER — ACETAMINOPHEN 500 MG
500 TABLET ORAL EVERY 4 HOURS PRN
Refills: 0 | COMMUNITY
Start: 2021-06-04 | End: 2023-10-12

## 2021-06-04 RX ORDER — SODIUM CHLORIDE 0.9 % (FLUSH) 0.9 %
10 SYRINGE (ML) INJECTION
Status: DISCONTINUED | OUTPATIENT
Start: 2021-06-04 | End: 2021-06-05 | Stop reason: HOSPADM

## 2021-06-04 RX ADMIN — HYDROCODONE BITARTRATE AND ACETAMINOPHEN 1 TABLET: 5; 325 TABLET ORAL at 04:06

## 2021-06-04 RX ADMIN — GABAPENTIN 100 MG: 100 CAPSULE ORAL at 08:06

## 2021-06-04 RX ADMIN — Medication 6 MG: at 10:06

## 2021-06-04 RX ADMIN — TICAGRELOR 90 MG: 90 TABLET ORAL at 10:06

## 2021-06-04 RX ADMIN — VANCOMYCIN HYDROCHLORIDE 1500 MG: 1.5 INJECTION, POWDER, LYOPHILIZED, FOR SOLUTION INTRAVENOUS at 02:06

## 2021-06-04 RX ADMIN — AMPICILLIN AND SULBACTAM 3 G: 2; 1 INJECTION, POWDER, FOR SOLUTION INTRAMUSCULAR; INTRAVENOUS at 12:06

## 2021-06-04 RX ADMIN — INSULIN ASPART 2 UNITS: 100 INJECTION, SOLUTION INTRAVENOUS; SUBCUTANEOUS at 12:06

## 2021-06-04 RX ADMIN — Medication 10 ML: at 10:06

## 2021-06-04 RX ADMIN — HYDROCODONE BITARTRATE AND ACETAMINOPHEN 1 TABLET: 5; 325 TABLET ORAL at 02:06

## 2021-06-04 RX ADMIN — INSULIN ASPART 2 UNITS: 100 INJECTION, SOLUTION INTRAVENOUS; SUBCUTANEOUS at 08:06

## 2021-06-04 RX ADMIN — GABAPENTIN 100 MG: 100 CAPSULE ORAL at 10:06

## 2021-06-04 RX ADMIN — INSULIN ASPART 8 UNITS: 100 INJECTION, SOLUTION INTRAVENOUS; SUBCUTANEOUS at 12:06

## 2021-06-04 RX ADMIN — TICAGRELOR 90 MG: 90 TABLET ORAL at 08:06

## 2021-06-04 RX ADMIN — AMPICILLIN AND SULBACTAM 3 G: 2; 1 INJECTION, POWDER, FOR SOLUTION INTRAMUSCULAR; INTRAVENOUS at 04:06

## 2021-06-04 RX ADMIN — HYDROCHLOROTHIAZIDE 25 MG: 25 TABLET ORAL at 08:06

## 2021-06-04 RX ADMIN — HYDROCODONE BITARTRATE AND ACETAMINOPHEN 1 TABLET: 5; 325 TABLET ORAL at 06:06

## 2021-06-04 RX ADMIN — Medication 10 ML: at 06:06

## 2021-06-04 RX ADMIN — INSULIN ASPART 8 UNITS: 100 INJECTION, SOLUTION INTRAVENOUS; SUBCUTANEOUS at 04:06

## 2021-06-04 RX ADMIN — POLYETHYLENE GLYCOL 3350 17 G: 17 POWDER, FOR SOLUTION ORAL at 08:06

## 2021-06-04 RX ADMIN — ATORVASTATIN CALCIUM 80 MG: 40 TABLET, FILM COATED ORAL at 10:06

## 2021-06-04 RX ADMIN — FLUOXETINE 20 MG: 20 CAPSULE ORAL at 08:06

## 2021-06-04 RX ADMIN — PANTOPRAZOLE SODIUM 40 MG: 40 TABLET, DELAYED RELEASE ORAL at 08:06

## 2021-06-04 RX ADMIN — Medication 10 ML: at 02:06

## 2021-06-04 RX ADMIN — HYDROCODONE BITARTRATE AND ACETAMINOPHEN 1 TABLET: 5; 325 TABLET ORAL at 10:06

## 2021-06-04 RX ADMIN — ATENOLOL 50 MG: 50 TABLET ORAL at 10:06

## 2021-06-04 RX ADMIN — INSULIN ASPART 5 UNITS: 100 INJECTION, SOLUTION INTRAVENOUS; SUBCUTANEOUS at 08:06

## 2021-06-04 RX ADMIN — Medication 10 ML: at 08:06

## 2021-06-04 RX ADMIN — ATENOLOL 50 MG: 50 TABLET ORAL at 08:06

## 2021-06-04 RX ADMIN — INSULIN DETEMIR 30 UNITS: 100 INJECTION, SOLUTION SUBCUTANEOUS at 10:06

## 2021-06-04 RX ADMIN — ISOSORBIDE MONONITRATE 30 MG: 30 TABLET, EXTENDED RELEASE ORAL at 08:06

## 2021-06-04 RX ADMIN — ENOXAPARIN SODIUM 40 MG: 40 INJECTION SUBCUTANEOUS at 04:06

## 2021-06-04 RX ADMIN — AMLODIPINE BESYLATE 10 MG: 5 TABLET ORAL at 08:06

## 2021-06-04 RX ADMIN — AMPICILLIN AND SULBACTAM 3 G: 2; 1 INJECTION, POWDER, FOR SOLUTION INTRAMUSCULAR; INTRAVENOUS at 10:06

## 2021-06-04 RX ADMIN — Medication 325 MG: at 08:06

## 2021-06-04 RX ADMIN — HYDROCODONE BITARTRATE AND ACETAMINOPHEN 1 TABLET: 5; 325 TABLET ORAL at 08:06

## 2021-06-04 RX ADMIN — ASPIRIN 81 MG CHEWABLE TABLET 81 MG: 81 TABLET CHEWABLE at 08:06

## 2021-06-05 VITALS
BODY MASS INDEX: 26.55 KG/M2 | HEART RATE: 72 BPM | TEMPERATURE: 98 F | HEIGHT: 69 IN | DIASTOLIC BLOOD PRESSURE: 61 MMHG | WEIGHT: 179.25 LBS | RESPIRATION RATE: 16 BRPM | SYSTOLIC BLOOD PRESSURE: 138 MMHG | OXYGEN SATURATION: 96 %

## 2021-06-05 LAB
ANION GAP SERPL CALC-SCNC: 8 MMOL/L (ref 8–16)
BACTERIA SPEC AEROBE CULT: ABNORMAL
BASOPHILS # BLD AUTO: 0.08 K/UL (ref 0–0.2)
BASOPHILS NFR BLD: 1 % (ref 0–1.9)
BUN SERPL-MCNC: 20 MG/DL (ref 8–23)
CALCIUM SERPL-MCNC: 9 MG/DL (ref 8.7–10.5)
CHLORIDE SERPL-SCNC: 103 MMOL/L (ref 95–110)
CO2 SERPL-SCNC: 25 MMOL/L (ref 23–29)
CREAT SERPL-MCNC: 1 MG/DL (ref 0.5–1.4)
DIFFERENTIAL METHOD: ABNORMAL
EOSINOPHIL # BLD AUTO: 0.4 K/UL (ref 0–0.5)
EOSINOPHIL NFR BLD: 5.3 % (ref 0–8)
ERYTHROCYTE [DISTWIDTH] IN BLOOD BY AUTOMATED COUNT: 17.4 % (ref 11.5–14.5)
EST. GFR  (AFRICAN AMERICAN): >60 ML/MIN/1.73 M^2
EST. GFR  (NON AFRICAN AMERICAN): 57 ML/MIN/1.73 M^2
GLUCOSE SERPL-MCNC: 228 MG/DL (ref 70–110)
HCT VFR BLD AUTO: 25.5 % (ref 37–48.5)
HGB BLD-MCNC: 7.8 G/DL (ref 12–16)
IMM GRANULOCYTES # BLD AUTO: 0.05 K/UL (ref 0–0.04)
IMM GRANULOCYTES NFR BLD AUTO: 0.6 % (ref 0–0.5)
LYMPHOCYTES # BLD AUTO: 1.8 K/UL (ref 1–4.8)
LYMPHOCYTES NFR BLD: 21.1 % (ref 18–48)
MAGNESIUM SERPL-MCNC: 1.6 MG/DL (ref 1.6–2.6)
MCH RBC QN AUTO: 25.4 PG (ref 27–31)
MCHC RBC AUTO-ENTMCNC: 30.6 G/DL (ref 32–36)
MCV RBC AUTO: 83 FL (ref 82–98)
MONOCYTES # BLD AUTO: 0.7 K/UL (ref 0.3–1)
MONOCYTES NFR BLD: 8.8 % (ref 4–15)
NEUTROPHILS # BLD AUTO: 5.2 K/UL (ref 1.8–7.7)
NEUTROPHILS NFR BLD: 63.2 % (ref 38–73)
NRBC BLD-RTO: 0 /100 WBC
PLATELET # BLD AUTO: 239 K/UL (ref 150–450)
PMV BLD AUTO: 11.9 FL (ref 9.2–12.9)
POCT GLUCOSE: 203 MG/DL (ref 70–110)
POTASSIUM SERPL-SCNC: 3.9 MMOL/L (ref 3.5–5.1)
RBC # BLD AUTO: 3.07 M/UL (ref 4–5.4)
SODIUM SERPL-SCNC: 136 MMOL/L (ref 136–145)
WBC # BLD AUTO: 8.29 K/UL (ref 3.9–12.7)

## 2021-06-05 PROCEDURE — 25000003 PHARM REV CODE 250: Performed by: HOSPITALIST

## 2021-06-05 PROCEDURE — A4216 STERILE WATER/SALINE, 10 ML: HCPCS | Performed by: HOSPITALIST

## 2021-06-05 PROCEDURE — 99024 POSTOP FOLLOW-UP VISIT: CPT | Mod: ,,, | Performed by: PODIATRIST

## 2021-06-05 PROCEDURE — 99233 SBSQ HOSP IP/OBS HIGH 50: CPT | Mod: ,,, | Performed by: STUDENT IN AN ORGANIZED HEALTH CARE EDUCATION/TRAINING PROGRAM

## 2021-06-05 PROCEDURE — 36415 COLL VENOUS BLD VENIPUNCTURE: CPT | Performed by: HOSPITALIST

## 2021-06-05 PROCEDURE — 99024 PR POST-OP FOLLOW-UP VISIT: ICD-10-PCS | Mod: ,,, | Performed by: PODIATRIST

## 2021-06-05 PROCEDURE — 99233 PR SUBSEQUENT HOSPITAL CARE,LEVL III: ICD-10-PCS | Mod: ,,, | Performed by: STUDENT IN AN ORGANIZED HEALTH CARE EDUCATION/TRAINING PROGRAM

## 2021-06-05 PROCEDURE — 80048 BASIC METABOLIC PNL TOTAL CA: CPT | Performed by: HOSPITALIST

## 2021-06-05 PROCEDURE — 63600175 PHARM REV CODE 636 W HCPCS: Performed by: STUDENT IN AN ORGANIZED HEALTH CARE EDUCATION/TRAINING PROGRAM

## 2021-06-05 PROCEDURE — 83735 ASSAY OF MAGNESIUM: CPT | Performed by: HOSPITALIST

## 2021-06-05 PROCEDURE — 25000003 PHARM REV CODE 250: Performed by: STUDENT IN AN ORGANIZED HEALTH CARE EDUCATION/TRAINING PROGRAM

## 2021-06-05 PROCEDURE — 85025 COMPLETE CBC W/AUTO DIFF WBC: CPT | Performed by: HOSPITALIST

## 2021-06-05 RX ORDER — GABAPENTIN 300 MG/1
300 CAPSULE ORAL 3 TIMES DAILY
Qty: 90 CAPSULE | Refills: 0 | Status: SHIPPED | OUTPATIENT
Start: 2021-06-05 | End: 2021-10-22 | Stop reason: SDUPTHER

## 2021-06-05 RX ORDER — GABAPENTIN 300 MG/1
300 CAPSULE ORAL 3 TIMES DAILY
Status: DISCONTINUED | OUTPATIENT
Start: 2021-06-05 | End: 2021-06-05 | Stop reason: HOSPADM

## 2021-06-05 RX ORDER — HYDROCODONE BITARTRATE AND ACETAMINOPHEN 5; 325 MG/1; MG/1
1 TABLET ORAL EVERY 6 HOURS PRN
Qty: 15 TABLET | Refills: 0 | Status: SHIPPED | OUTPATIENT
Start: 2021-06-05 | End: 2022-06-20 | Stop reason: CLARIF

## 2021-06-05 RX ORDER — FLUOXETINE HYDROCHLORIDE 20 MG/1
20 CAPSULE ORAL DAILY
Qty: 30 CAPSULE | Refills: 0 | Status: SHIPPED | OUTPATIENT
Start: 2021-06-05 | End: 2021-07-21 | Stop reason: SDUPTHER

## 2021-06-05 RX ORDER — FERROUS SULFATE 325(65) MG
325 TABLET, DELAYED RELEASE (ENTERIC COATED) ORAL DAILY
Qty: 30 TABLET | Refills: 11 | Status: SHIPPED | OUTPATIENT
Start: 2021-06-06 | End: 2023-10-12

## 2021-06-05 RX ORDER — CEFAZOLIN SODIUM 2 G/50ML
2 SOLUTION INTRAVENOUS
Status: DISCONTINUED | OUTPATIENT
Start: 2021-06-05 | End: 2021-06-05 | Stop reason: HOSPADM

## 2021-06-05 RX ADMIN — PANTOPRAZOLE SODIUM 40 MG: 40 TABLET, DELAYED RELEASE ORAL at 08:06

## 2021-06-05 RX ADMIN — Medication 10 ML: at 12:06

## 2021-06-05 RX ADMIN — TICAGRELOR 90 MG: 90 TABLET ORAL at 08:06

## 2021-06-05 RX ADMIN — AMPICILLIN AND SULBACTAM 3 G: 2; 1 INJECTION, POWDER, FOR SOLUTION INTRAMUSCULAR; INTRAVENOUS at 06:06

## 2021-06-05 RX ADMIN — ASPIRIN 81 MG CHEWABLE TABLET 81 MG: 81 TABLET CHEWABLE at 08:06

## 2021-06-05 RX ADMIN — GABAPENTIN 100 MG: 100 CAPSULE ORAL at 08:06

## 2021-06-05 RX ADMIN — INSULIN ASPART 4 UNITS: 100 INJECTION, SOLUTION INTRAVENOUS; SUBCUTANEOUS at 08:06

## 2021-06-05 RX ADMIN — INSULIN ASPART 8 UNITS: 100 INJECTION, SOLUTION INTRAVENOUS; SUBCUTANEOUS at 08:06

## 2021-06-05 RX ADMIN — Medication 325 MG: at 08:06

## 2021-06-05 RX ADMIN — ATENOLOL 50 MG: 50 TABLET ORAL at 08:06

## 2021-06-05 RX ADMIN — HYDROCODONE BITARTRATE AND ACETAMINOPHEN 1 TABLET: 5; 325 TABLET ORAL at 12:06

## 2021-06-05 RX ADMIN — DEXTROSE 2 G: 50 INJECTION, SOLUTION INTRAVENOUS at 10:06

## 2021-06-05 RX ADMIN — Medication 10 ML: at 06:06

## 2021-06-05 RX ADMIN — ACETAMINOPHEN 650 MG: 325 TABLET ORAL at 08:06

## 2021-06-05 RX ADMIN — AMLODIPINE BESYLATE 10 MG: 5 TABLET ORAL at 08:06

## 2021-06-05 RX ADMIN — HYDROCHLOROTHIAZIDE 25 MG: 25 TABLET ORAL at 08:06

## 2021-06-05 RX ADMIN — HYDROCODONE BITARTRATE AND ACETAMINOPHEN 1 TABLET: 5; 325 TABLET ORAL at 06:06

## 2021-06-05 RX ADMIN — FLUOXETINE 20 MG: 20 CAPSULE ORAL at 08:06

## 2021-06-05 RX ADMIN — ISOSORBIDE MONONITRATE 30 MG: 30 TABLET, EXTENDED RELEASE ORAL at 08:06

## 2021-06-06 ENCOUNTER — NURSE TRIAGE (OUTPATIENT)
Dept: ADMINISTRATIVE | Facility: CLINIC | Age: 71
End: 2021-06-06

## 2021-06-06 ENCOUNTER — HOSPITAL ENCOUNTER (EMERGENCY)
Facility: HOSPITAL | Age: 71
Discharge: HOME | End: 2021-06-06
Attending: EMERGENCY MEDICINE
Payer: MEDICARE

## 2021-06-06 VITALS
SYSTOLIC BLOOD PRESSURE: 162 MMHG | RESPIRATION RATE: 18 BRPM | DIASTOLIC BLOOD PRESSURE: 76 MMHG | HEART RATE: 79 BPM | OXYGEN SATURATION: 100 % | BODY MASS INDEX: 26.47 KG/M2 | HEIGHT: 69 IN | TEMPERATURE: 99 F

## 2021-06-06 DIAGNOSIS — T82.898A OCCLUSION OF PERIPHERALLY INSERTED CENTRAL CATHETER (PICC) LINE, INITIAL ENCOUNTER: Primary | ICD-10-CM

## 2021-06-06 LAB
BACTERIA BLD CULT: NORMAL
BACTERIA BLD CULT: NORMAL

## 2021-06-06 PROCEDURE — 99281 EMR DPT VST MAYX REQ PHY/QHP: CPT

## 2021-06-06 RX ORDER — HEPARIN 100 UNIT/ML
5 SYRINGE INTRAVENOUS ONCE
Status: CANCELLED | OUTPATIENT
Start: 2021-06-06

## 2021-06-07 ENCOUNTER — TELEPHONE (OUTPATIENT)
Dept: PRIMARY CARE CLINIC | Facility: CLINIC | Age: 71
End: 2021-06-07

## 2021-06-07 ENCOUNTER — DOCUMENTATION ONLY (OUTPATIENT)
Dept: VASCULAR SURGERY | Facility: CLINIC | Age: 71
End: 2021-06-07

## 2021-06-07 ENCOUNTER — OFFICE VISIT (OUTPATIENT)
Dept: INTERNAL MEDICINE | Facility: CLINIC | Age: 71
End: 2021-06-07
Payer: MEDICARE

## 2021-06-07 ENCOUNTER — TELEPHONE (OUTPATIENT)
Dept: INFECTIOUS DISEASES | Facility: CLINIC | Age: 71
End: 2021-06-07

## 2021-06-07 ENCOUNTER — TELEPHONE (OUTPATIENT)
Dept: VASCULAR SURGERY | Facility: CLINIC | Age: 71
End: 2021-06-07

## 2021-06-07 VITALS
WEIGHT: 184.94 LBS | BODY MASS INDEX: 27.39 KG/M2 | OXYGEN SATURATION: 95 % | DIASTOLIC BLOOD PRESSURE: 62 MMHG | HEART RATE: 66 BPM | SYSTOLIC BLOOD PRESSURE: 138 MMHG | HEIGHT: 69 IN

## 2021-06-07 DIAGNOSIS — S91.351D DOG BITE OF RIGHT FOOT, SUBSEQUENT ENCOUNTER: ICD-10-CM

## 2021-06-07 DIAGNOSIS — L03.031 CELLULITIS OF GREAT TOE, RIGHT: ICD-10-CM

## 2021-06-07 DIAGNOSIS — I15.2 HYPERTENSION ASSOCIATED WITH DIABETES: ICD-10-CM

## 2021-06-07 DIAGNOSIS — I25.10 CORONARY ARTERY DISEASE INVOLVING NATIVE CORONARY ARTERY OF NATIVE HEART WITHOUT ANGINA PECTORIS: ICD-10-CM

## 2021-06-07 DIAGNOSIS — E11.59 HYPERTENSION ASSOCIATED WITH DIABETES: ICD-10-CM

## 2021-06-07 DIAGNOSIS — L03.031 CELLULITIS OF GREAT TOE, RIGHT: Primary | ICD-10-CM

## 2021-06-07 DIAGNOSIS — F33.41 RECURRENT MAJOR DEPRESSIVE DISORDER, IN PARTIAL REMISSION: ICD-10-CM
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
## 2021-06-07 DIAGNOSIS — Z09 HOSPITAL DISCHARGE FOLLOW-UP: Primary | ICD-10-CM

## 2021-06-07 DIAGNOSIS — A49.01 MSSA (METHICILLIN SUSCEPTIBLE STAPHYLOCOCCUS AUREUS) INFECTION: ICD-10-CM

## 2021-06-07 LAB
BACTERIA SPEC ANAEROBE CULT: ABNORMAL
FINAL PATHOLOGIC DIAGNOSIS: NORMAL
GROSS: NORMAL
Lab: NORMAL

## 2021-06-07 PROCEDURE — 99496 TRANSJ CARE MGMT HIGH F2F 7D: CPT | Mod: S$GLB,,, | Performed by: FAMILY MEDICINE

## 2021-06-07 PROCEDURE — G0180 MD CERTIFICATION HHA PATIENT: HCPCS | Mod: ,,, | Performed by: HOSPITALIST

## 2021-06-07 PROCEDURE — 99496 TRANSITIONAL CARE MANAGE SERVICE 7 DAY DISCHARGE: ICD-10-PCS | Mod: S$GLB,,, | Performed by: FAMILY MEDICINE

## 2021-06-07 PROCEDURE — 1126F PR PAIN SEVERITY QUANTIFIED, NO PAIN PRESENT: ICD-10-PCS | Mod: S$GLB,,, | Performed by: FAMILY MEDICINE

## 2021-06-07 PROCEDURE — 1101F PT FALLS ASSESS-DOCD LE1/YR: CPT | Mod: CPTII,S$GLB,, | Performed by: FAMILY MEDICINE

## 2021-06-07 PROCEDURE — 3008F BODY MASS INDEX DOCD: CPT | Mod: CPTII,S$GLB,, | Performed by: FAMILY MEDICINE

## 2021-06-07 PROCEDURE — 3288F PR FALLS RISK ASSESSMENT DOCUMENTED: ICD-10-PCS | Mod: CPTII,S$GLB,, | Performed by: FAMILY MEDICINE

## 2021-06-07 PROCEDURE — 99999 PR PBB SHADOW E&M-EST. PATIENT-LVL V: ICD-10-PCS | Mod: PBBFAC,,, | Performed by: FAMILY MEDICINE

## 2021-06-07 PROCEDURE — 3008F PR BODY MASS INDEX (BMI) DOCUMENTED: ICD-10-PCS | Mod: CPTII,S$GLB,, | Performed by: FAMILY MEDICINE

## 2021-06-07 PROCEDURE — 99499 UNLISTED E&M SERVICE: CPT | Mod: S$GLB,,, | Performed by: FAMILY MEDICINE

## 2021-06-07 PROCEDURE — 1101F PR PT FALLS ASSESS DOC 0-1 FALLS W/OUT INJ PAST YR: ICD-10-PCS | Mod: CPTII,S$GLB,, | Performed by: FAMILY MEDICINE

## 2021-06-07 PROCEDURE — 99999 PR PBB SHADOW E&M-EST. PATIENT-LVL V: CPT | Mod: PBBFAC,,, | Performed by: FAMILY MEDICINE

## 2021-06-07 PROCEDURE — 1126F AMNT PAIN NOTED NONE PRSNT: CPT | Mod: S$GLB,,, | Performed by: FAMILY MEDICINE

## 2021-06-07 PROCEDURE — 99499 RISK ADDL DX/OHS AUDIT: ICD-10-PCS | Mod: S$GLB,,, | Performed by: FAMILY MEDICINE

## 2021-06-07 PROCEDURE — 3288F FALL RISK ASSESSMENT DOCD: CPT | Mod: CPTII,S$GLB,, | Performed by: FAMILY MEDICINE

## 2021-06-07 PROCEDURE — G0180 PR HOME HEALTH MD CERTIFICATION: ICD-10-PCS | Mod: ,,, | Performed by: HOSPITALIST

## 2021-06-07 RX ORDER — PANTOPRAZOLE SODIUM 40 MG/1
40 TABLET, DELAYED RELEASE ORAL DAILY
Qty: 90 TABLET | Refills: 0 | Status: SHIPPED | OUTPATIENT
Start: 2021-06-07 | End: 2021-08-04 | Stop reason: SDUPTHER

## 2021-06-08 ENCOUNTER — LAB VISIT (OUTPATIENT)
Dept: LAB | Facility: HOSPITAL | Age: 71
End: 2021-06-08
Attending: STUDENT IN AN ORGANIZED HEALTH CARE EDUCATION/TRAINING PROGRAM
Payer: MEDICARE

## 2021-06-08 ENCOUNTER — TELEPHONE (OUTPATIENT)
Dept: INFECTIOUS DISEASES | Facility: CLINIC | Age: 71
End: 2021-06-08

## 2021-06-08 DIAGNOSIS — L03.031 CELLULITIS OF GREAT TOE OF RIGHT FOOT: Primary | ICD-10-CM

## 2021-06-08 LAB
ANION GAP SERPL CALC-SCNC: 12 MMOL/L (ref 8–16)
BUN SERPL-MCNC: 22 MG/DL (ref 8–23)
CALCIUM SERPL-MCNC: 9.6 MG/DL (ref 8.7–10.5)
CHLORIDE SERPL-SCNC: 102 MMOL/L (ref 95–110)
CO2 SERPL-SCNC: 24 MMOL/L (ref 23–29)
CREAT SERPL-MCNC: 1.3 MG/DL (ref 0.5–1.4)
EST. GFR  (AFRICAN AMERICAN): 48 ML/MIN/1.73 M^2
EST. GFR  (NON AFRICAN AMERICAN): 42 ML/MIN/1.73 M^2
GLUCOSE SERPL-MCNC: 232 MG/DL (ref 70–110)
POTASSIUM SERPL-SCNC: 4 MMOL/L (ref 3.5–5.1)
SODIUM SERPL-SCNC: 138 MMOL/L (ref 136–145)

## 2021-06-08 PROCEDURE — 80048 BASIC METABOLIC PNL TOTAL CA: CPT | Performed by: STUDENT IN AN ORGANIZED HEALTH CARE EDUCATION/TRAINING PROGRAM

## 2021-06-10 LAB
LEFT TBI: 0.62
LEFT TOE PRESSURE: 90 MMHG
RIGHT ARM BP: 145 MMHG
RIGHT TBI: 0.8
RIGHT TOE PRESSURE: 116 MMHG

## 2021-06-11 ENCOUNTER — HOSPITAL ENCOUNTER (OUTPATIENT)
Dept: WOUND CARE | Facility: HOSPITAL | Age: 71
Discharge: HOME | End: 2021-06-11
Attending: PODIATRIST
Payer: MEDICARE

## 2021-06-11 VITALS
DIASTOLIC BLOOD PRESSURE: 70 MMHG | TEMPERATURE: 97 F | BODY MASS INDEX: 27.11 KG/M2 | HEIGHT: 69 IN | RESPIRATION RATE: 20 BRPM | HEART RATE: 80 BPM | SYSTOLIC BLOOD PRESSURE: 154 MMHG | WEIGHT: 183 LBS

## 2021-06-11 DIAGNOSIS — S91.351A: Primary | ICD-10-CM
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
## 2021-06-11 DIAGNOSIS — Z87.2 HISTORY OF ABSCESS OF SKIN AND SUBCUTANEOUS TISSUE: ICD-10-CM

## 2021-06-11 PROCEDURE — 99204 OFFICE O/P NEW MOD 45 MIN: CPT | Mod: 25 | Performed by: PODIATRIST

## 2021-06-11 PROCEDURE — 11042 DBRDMT SUBQ TIS 1ST 20SQCM/<: CPT | Performed by: PODIATRIST

## 2021-06-11 PROCEDURE — 99024 POSTOP FOLLOW-UP VISIT: CPT | Mod: ,,, | Performed by: PODIATRIST

## 2021-06-11 PROCEDURE — 99024 PR POST-OP FOLLOW-UP VISIT: ICD-10-PCS | Mod: ,,, | Performed by: PODIATRIST

## 2021-06-11 PROCEDURE — 27201912 HC WOUND CARE DEBRIDEMENT SUPPLIES: Performed by: PODIATRIST

## 2021-06-14 ENCOUNTER — LAB VISIT (OUTPATIENT)
Dept: LAB | Facility: HOSPITAL | Age: 71
End: 2021-06-14
Attending: STUDENT IN AN ORGANIZED HEALTH CARE EDUCATION/TRAINING PROGRAM
Payer: MEDICARE

## 2021-06-14 DIAGNOSIS — S91.351A: ICD-10-CM

## 2021-06-14 DIAGNOSIS — L03.031 ABSCESS OF FIFTH TOENAIL OF RIGHT FOOT: Primary | ICD-10-CM

## 2021-06-14 DIAGNOSIS — L03.119 CELLULITIS AND ABSCESS OF FOOT, EXCEPT TOES: ICD-10-CM

## 2021-06-14 DIAGNOSIS — L02.619 CELLULITIS AND ABSCESS OF FOOT, EXCEPT TOES: ICD-10-CM
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
## 2021-06-14 LAB
ANION GAP SERPL CALC-SCNC: 12 MMOL/L (ref 8–16)
BASOPHILS # BLD AUTO: 0.1 K/UL (ref 0–0.2)
BASOPHILS NFR BLD: 1 % (ref 0–1.9)
BUN SERPL-MCNC: 23 MG/DL (ref 8–23)
CALCIUM SERPL-MCNC: 9.4 MG/DL (ref 8.7–10.5)
CHLORIDE SERPL-SCNC: 104 MMOL/L (ref 95–110)
CO2 SERPL-SCNC: 21 MMOL/L (ref 23–29)
CREAT SERPL-MCNC: 1.1 MG/DL (ref 0.5–1.4)
CRP SERPL-MCNC: 1.4 MG/L (ref 0–8.2)
DIFFERENTIAL METHOD: ABNORMAL
EOSINOPHIL # BLD AUTO: 0.8 K/UL (ref 0–0.5)
EOSINOPHIL NFR BLD: 8.3 % (ref 0–8)
ERYTHROCYTE [DISTWIDTH] IN BLOOD BY AUTOMATED COUNT: 17.7 % (ref 11.5–14.5)
EST. GFR  (AFRICAN AMERICAN): 59 ML/MIN/1.73 M^2
EST. GFR  (NON AFRICAN AMERICAN): 51 ML/MIN/1.73 M^2
GLUCOSE SERPL-MCNC: 341 MG/DL (ref 70–110)
HCT VFR BLD AUTO: 26.9 % (ref 37–48.5)
HGB BLD-MCNC: 7.9 G/DL (ref 12–16)
IMM GRANULOCYTES # BLD AUTO: 0.05 K/UL (ref 0–0.04)
IMM GRANULOCYTES NFR BLD AUTO: 0.5 % (ref 0–0.5)
LYMPHOCYTES # BLD AUTO: 1.7 K/UL (ref 1–4.8)
LYMPHOCYTES NFR BLD: 17.2 % (ref 18–48)
MCH RBC QN AUTO: 25 PG (ref 27–31)
MCHC RBC AUTO-ENTMCNC: 29.4 G/DL (ref 32–36)
MCV RBC AUTO: 85 FL (ref 82–98)
MONOCYTES # BLD AUTO: 0.5 K/UL (ref 0.3–1)
MONOCYTES NFR BLD: 5.4 % (ref 4–15)
NEUTROPHILS # BLD AUTO: 6.5 K/UL (ref 1.8–7.7)
NEUTROPHILS NFR BLD: 67.6 % (ref 38–73)
NRBC BLD-RTO: 0 /100 WBC
PLATELET # BLD AUTO: 271 K/UL (ref 150–450)
PMV BLD AUTO: 12.6 FL (ref 9.2–12.9)
POTASSIUM SERPL-SCNC: 4.3 MMOL/L (ref 3.5–5.1)
RBC # BLD AUTO: 3.16 M/UL (ref 4–5.4)
SODIUM SERPL-SCNC: 137 MMOL/L (ref 136–145)
WBC # BLD AUTO: 9.65 K/UL (ref 3.9–12.7)

## 2021-06-14 PROCEDURE — 86140 C-REACTIVE PROTEIN: CPT | Performed by: STUDENT IN AN ORGANIZED HEALTH CARE EDUCATION/TRAINING PROGRAM

## 2021-06-14 PROCEDURE — 85025 COMPLETE CBC W/AUTO DIFF WBC: CPT | Performed by: STUDENT IN AN ORGANIZED HEALTH CARE EDUCATION/TRAINING PROGRAM

## 2021-06-14 PROCEDURE — 80048 BASIC METABOLIC PNL TOTAL CA: CPT | Performed by: STUDENT IN AN ORGANIZED HEALTH CARE EDUCATION/TRAINING PROGRAM

## 2021-06-15 ENCOUNTER — DOCUMENT SCAN (OUTPATIENT)
Dept: HOME HEALTH SERVICES | Facility: HOSPITAL | Age: 71
End: 2021-06-15
Payer: MEDICAID

## 2021-06-18 ENCOUNTER — HOSPITAL ENCOUNTER (OUTPATIENT)
Dept: WOUND CARE | Facility: HOSPITAL | Age: 71
Discharge: HOME | End: 2021-06-18
Attending: PODIATRIST
Payer: MEDICARE

## 2021-06-18 ENCOUNTER — TELEPHONE (OUTPATIENT)
Dept: PRIMARY CARE CLINIC | Facility: CLINIC | Age: 71
End: 2021-06-18

## 2021-06-18 ENCOUNTER — TELEPHONE (OUTPATIENT)
Dept: WOUND CARE | Facility: HOSPITAL | Age: 71
End: 2021-06-18

## 2021-06-18 VITALS
TEMPERATURE: 98 F | RESPIRATION RATE: 18 BRPM | HEART RATE: 84 BPM | SYSTOLIC BLOOD PRESSURE: 194 MMHG | DIASTOLIC BLOOD PRESSURE: 75 MMHG

## 2021-06-18 DIAGNOSIS — E11.51 PERIPHERAL VASCULAR DISEASE IN DIABETES MELLITUS: ICD-10-CM

## 2021-06-18 DIAGNOSIS — L03.031 CELLULITIS OF GREAT TOE, RIGHT: ICD-10-CM

## 2021-06-18 DIAGNOSIS — E11.43 DIABETIC AUTONOMIC NEUROPATHY: ICD-10-CM

## 2021-06-18 DIAGNOSIS — L97.512 FOOT ULCERATION, RIGHT, WITH FAT LAYER EXPOSED: Primary | ICD-10-CM

## 2021-06-18 PROCEDURE — 11042 DBRDMT SUBQ TIS 1ST 20SQCM/<: CPT | Mod: ,,, | Performed by: PODIATRIST

## 2021-06-18 PROCEDURE — 11042 WOUND DEBRIDEMENT: ICD-10-PCS | Mod: ,,, | Performed by: PODIATRIST

## 2021-06-18 PROCEDURE — 99499 NO LOS: ICD-10-PCS | Mod: ,,, | Performed by: PODIATRIST

## 2021-06-18 PROCEDURE — 99499 UNLISTED E&M SERVICE: CPT | Mod: ,,, | Performed by: PODIATRIST

## 2021-06-18 PROCEDURE — 11042 DBRDMT SUBQ TIS 1ST 20SQCM/<: CPT | Performed by: PODIATRIST

## 2021-06-21 ENCOUNTER — LAB VISIT (OUTPATIENT)
Dept: LAB | Facility: HOSPITAL | Age: 71
End: 2021-06-21
Attending: STUDENT IN AN ORGANIZED HEALTH CARE EDUCATION/TRAINING PROGRAM
Payer: MEDICARE

## 2021-06-21 DIAGNOSIS — Z79.2 ENCOUNTER FOR LONG-TERM (CURRENT) USE OF ANTIBIOTICS: Primary | ICD-10-CM

## 2021-06-21 LAB
ANION GAP SERPL CALC-SCNC: 12 MMOL/L (ref 8–16)
BASOPHILS # BLD AUTO: 0.13 K/UL (ref 0–0.2)
BASOPHILS NFR BLD: 1.5 % (ref 0–1.9)
BUN SERPL-MCNC: 21 MG/DL (ref 8–23)
CALCIUM SERPL-MCNC: 9.4 MG/DL (ref 8.7–10.5)
CHLORIDE SERPL-SCNC: 104 MMOL/L (ref 95–110)
CO2 SERPL-SCNC: 23 MMOL/L (ref 23–29)
CREAT SERPL-MCNC: 0.9 MG/DL (ref 0.5–1.4)
CRP SERPL-MCNC: 1.5 MG/L (ref 0–8.2)
DIFFERENTIAL METHOD: ABNORMAL
EOSINOPHIL # BLD AUTO: 0.6 K/UL (ref 0–0.5)
EOSINOPHIL NFR BLD: 7.3 % (ref 0–8)
ERYTHROCYTE [DISTWIDTH] IN BLOOD BY AUTOMATED COUNT: 17.2 % (ref 11.5–14.5)
EST. GFR  (AFRICAN AMERICAN): >60 ML/MIN/1.73 M^2
EST. GFR  (NON AFRICAN AMERICAN): >60 ML/MIN/1.73 M^2
GLUCOSE SERPL-MCNC: 183 MG/DL (ref 70–110)
HCT VFR BLD AUTO: 28.3 % (ref 37–48.5)
HGB BLD-MCNC: 8.4 G/DL (ref 12–16)
IMM GRANULOCYTES # BLD AUTO: 0.03 K/UL (ref 0–0.04)
IMM GRANULOCYTES NFR BLD AUTO: 0.4 % (ref 0–0.5)
LYMPHOCYTES # BLD AUTO: 1.7 K/UL (ref 1–4.8)
LYMPHOCYTES NFR BLD: 19.7 % (ref 18–48)
MCH RBC QN AUTO: 24.8 PG (ref 27–31)
MCHC RBC AUTO-ENTMCNC: 29.7 G/DL (ref 32–36)
MCV RBC AUTO: 84 FL (ref 82–98)
MONOCYTES # BLD AUTO: 0.6 K/UL (ref 0.3–1)
MONOCYTES NFR BLD: 7.4 % (ref 4–15)
NEUTROPHILS # BLD AUTO: 5.4 K/UL (ref 1.8–7.7)
NEUTROPHILS NFR BLD: 63.7 % (ref 38–73)
NRBC BLD-RTO: 0 /100 WBC
PLATELET # BLD AUTO: 301 K/UL (ref 150–450)
PMV BLD AUTO: 12.2 FL (ref 9.2–12.9)
POTASSIUM SERPL-SCNC: 4.2 MMOL/L (ref 3.5–5.1)
RBC # BLD AUTO: 3.39 M/UL (ref 4–5.4)
SODIUM SERPL-SCNC: 139 MMOL/L (ref 136–145)
WBC # BLD AUTO: 8.41 K/UL (ref 3.9–12.7)

## 2021-06-21 PROCEDURE — 85025 COMPLETE CBC W/AUTO DIFF WBC: CPT | Performed by: STUDENT IN AN ORGANIZED HEALTH CARE EDUCATION/TRAINING PROGRAM

## 2021-06-21 PROCEDURE — 80048 BASIC METABOLIC PNL TOTAL CA: CPT | Performed by: STUDENT IN AN ORGANIZED HEALTH CARE EDUCATION/TRAINING PROGRAM

## 2021-06-21 PROCEDURE — 86140 C-REACTIVE PROTEIN: CPT | Performed by: STUDENT IN AN ORGANIZED HEALTH CARE EDUCATION/TRAINING PROGRAM

## 2021-06-25 ENCOUNTER — TELEPHONE (OUTPATIENT)
Dept: WOUND CARE | Facility: HOSPITAL | Age: 71
End: 2021-06-25

## 2021-06-25 ENCOUNTER — HOSPITAL ENCOUNTER (OUTPATIENT)
Dept: WOUND CARE | Facility: HOSPITAL | Age: 71
Discharge: HOME | End: 2021-06-25
Attending: PODIATRIST
Payer: MEDICARE

## 2021-06-25 VITALS
HEART RATE: 99 BPM | SYSTOLIC BLOOD PRESSURE: 124 MMHG | TEMPERATURE: 98 F | DIASTOLIC BLOOD PRESSURE: 80 MMHG | RESPIRATION RATE: 20 BRPM

## 2021-06-25 DIAGNOSIS — L97.512 FOOT ULCERATION, RIGHT, WITH FAT LAYER EXPOSED: Primary | ICD-10-CM

## 2021-06-25 DIAGNOSIS — E11.51 PERIPHERAL VASCULAR DISEASE IN DIABETES MELLITUS: ICD-10-CM

## 2021-06-25 PROCEDURE — 99499 UNLISTED E&M SERVICE: CPT | Mod: ,,, | Performed by: PODIATRIST

## 2021-06-25 PROCEDURE — 99499 NO LOS: ICD-10-PCS | Mod: ,,, | Performed by: PODIATRIST

## 2021-06-25 PROCEDURE — 11042 DBRDMT SUBQ TIS 1ST 20SQCM/<: CPT

## 2021-06-25 PROCEDURE — 11042 WOUND DEBRIDEMENT: ICD-10-PCS | Mod: ,,, | Performed by: PODIATRIST

## 2021-06-25 PROCEDURE — 11042 DBRDMT SUBQ TIS 1ST 20SQCM/<: CPT | Performed by: PODIATRIST

## 2021-06-25 PROCEDURE — 11042 DBRDMT SUBQ TIS 1ST 20SQCM/<: CPT | Mod: ,,, | Performed by: PODIATRIST

## 2021-06-25 PROCEDURE — 27201912 HC WOUND CARE DEBRIDEMENT SUPPLIES: Performed by: PODIATRIST

## 2021-06-28 ENCOUNTER — EXTERNAL HOME HEALTH (OUTPATIENT)
Dept: HOME HEALTH SERVICES | Facility: HOSPITAL | Age: 71
End: 2021-06-28
Payer: MEDICARE

## 2021-06-28 ENCOUNTER — LAB VISIT (OUTPATIENT)
Dept: LAB | Facility: HOSPITAL | Age: 71
End: 2021-06-28
Attending: STUDENT IN AN ORGANIZED HEALTH CARE EDUCATION/TRAINING PROGRAM
Payer: MEDICARE

## 2021-06-28 DIAGNOSIS — Z79.2 ENCOUNTER FOR LONG-TERM (CURRENT) USE OF ANTIBIOTICS: Primary | ICD-10-CM

## 2021-06-28 LAB
ANION GAP SERPL CALC-SCNC: 7 MMOL/L (ref 8–16)
BASOPHILS # BLD AUTO: 0.1 K/UL (ref 0–0.2)
BASOPHILS NFR BLD: 1.4 % (ref 0–1.9)
BUN SERPL-MCNC: 17 MG/DL (ref 8–23)
CALCIUM SERPL-MCNC: 9.2 MG/DL (ref 8.7–10.5)
CHLORIDE SERPL-SCNC: 106 MMOL/L (ref 95–110)
CO2 SERPL-SCNC: 26 MMOL/L (ref 23–29)
CREAT SERPL-MCNC: 0.9 MG/DL (ref 0.5–1.4)
CRP SERPL-MCNC: 1.5 MG/L (ref 0–8.2)
DIFFERENTIAL METHOD: ABNORMAL
EOSINOPHIL # BLD AUTO: 0.5 K/UL (ref 0–0.5)
EOSINOPHIL NFR BLD: 7.3 % (ref 0–8)
ERYTHROCYTE [DISTWIDTH] IN BLOOD BY AUTOMATED COUNT: 17 % (ref 11.5–14.5)
EST. GFR  (AFRICAN AMERICAN): >60 ML/MIN/1.73 M^2
EST. GFR  (NON AFRICAN AMERICAN): >60 ML/MIN/1.73 M^2
GLUCOSE SERPL-MCNC: 196 MG/DL (ref 70–110)
HCT VFR BLD AUTO: 27.1 % (ref 37–48.5)
HGB BLD-MCNC: 8.2 G/DL (ref 12–16)
IMM GRANULOCYTES # BLD AUTO: 0.02 K/UL (ref 0–0.04)
IMM GRANULOCYTES NFR BLD AUTO: 0.3 % (ref 0–0.5)
LYMPHOCYTES # BLD AUTO: 1.6 K/UL (ref 1–4.8)
LYMPHOCYTES NFR BLD: 22 % (ref 18–48)
MCH RBC QN AUTO: 25.2 PG (ref 27–31)
MCHC RBC AUTO-ENTMCNC: 30.3 G/DL (ref 32–36)
MCV RBC AUTO: 83 FL (ref 82–98)
MONOCYTES # BLD AUTO: 0.6 K/UL (ref 0.3–1)
MONOCYTES NFR BLD: 7.6 % (ref 4–15)
NEUTROPHILS # BLD AUTO: 4.5 K/UL (ref 1.8–7.7)
NEUTROPHILS NFR BLD: 61.4 % (ref 38–73)
NRBC BLD-RTO: 0 /100 WBC
PLATELET # BLD AUTO: 254 K/UL (ref 150–450)
PMV BLD AUTO: 12.3 FL (ref 9.2–12.9)
POTASSIUM SERPL-SCNC: 4.5 MMOL/L (ref 3.5–5.1)
RBC # BLD AUTO: 3.26 M/UL (ref 4–5.4)
SODIUM SERPL-SCNC: 139 MMOL/L (ref 136–145)
WBC # BLD AUTO: 7.26 K/UL (ref 3.9–12.7)

## 2021-06-28 PROCEDURE — 85025 COMPLETE CBC W/AUTO DIFF WBC: CPT | Performed by: STUDENT IN AN ORGANIZED HEALTH CARE EDUCATION/TRAINING PROGRAM

## 2021-06-28 PROCEDURE — 80048 BASIC METABOLIC PNL TOTAL CA: CPT | Performed by: STUDENT IN AN ORGANIZED HEALTH CARE EDUCATION/TRAINING PROGRAM

## 2021-06-28 PROCEDURE — 86140 C-REACTIVE PROTEIN: CPT | Performed by: STUDENT IN AN ORGANIZED HEALTH CARE EDUCATION/TRAINING PROGRAM

## 2021-06-29 ENCOUNTER — DOCUMENT SCAN (OUTPATIENT)
Dept: HOME HEALTH SERVICES | Facility: HOSPITAL | Age: 71
End: 2021-06-29
Payer: MEDICAID

## 2021-07-01 ENCOUNTER — INITIAL CONSULT (OUTPATIENT)
Dept: VASCULAR SURGERY | Facility: CLINIC | Age: 71
End: 2021-07-01
Payer: MEDICARE

## 2021-07-01 VITALS
WEIGHT: 183.88 LBS | BODY MASS INDEX: 27.24 KG/M2 | DIASTOLIC BLOOD PRESSURE: 72 MMHG | HEIGHT: 69 IN | SYSTOLIC BLOOD PRESSURE: 140 MMHG

## 2021-07-01 DIAGNOSIS — L03.031 CELLULITIS OF GREAT TOE, RIGHT: Primary | ICD-10-CM

## 2021-07-01 PROCEDURE — 1159F PR MEDICATION LIST DOCUMENTED IN MEDICAL RECORD: ICD-10-PCS | Mod: S$GLB,,, | Performed by: SURGERY

## 2021-07-01 PROCEDURE — 1111F PR DISCHARGE MEDS RECONCILED W/ CURRENT OUTPATIENT MED LIST: ICD-10-PCS | Mod: CPTII,S$GLB,, | Performed by: SURGERY

## 2021-07-01 PROCEDURE — 99999 PR PBB SHADOW E&M-EST. PATIENT-LVL II: CPT | Mod: PBBFAC,,, | Performed by: SURGERY

## 2021-07-01 PROCEDURE — 99215 OFFICE O/P EST HI 40 MIN: CPT | Mod: S$GLB,,, | Performed by: SURGERY

## 2021-07-01 PROCEDURE — 1159F MED LIST DOCD IN RCRD: CPT | Mod: S$GLB,,, | Performed by: SURGERY

## 2021-07-01 PROCEDURE — 99215 PR OFFICE/OUTPT VISIT, EST, LEVL V, 40-54 MIN: ICD-10-PCS | Mod: S$GLB,,, | Performed by: SURGERY

## 2021-07-01 PROCEDURE — 1111F DSCHRG MED/CURRENT MED MERGE: CPT | Mod: CPTII,S$GLB,, | Performed by: SURGERY

## 2021-07-01 PROCEDURE — 99999 PR PBB SHADOW E&M-EST. PATIENT-LVL II: ICD-10-PCS | Mod: PBBFAC,,, | Performed by: SURGERY

## 2021-07-02 ENCOUNTER — DOCUMENT SCAN (OUTPATIENT)
Dept: HOME HEALTH SERVICES | Facility: HOSPITAL | Age: 71
End: 2021-07-02
Payer: MEDICAID

## 2021-07-02 ENCOUNTER — HOSPITAL ENCOUNTER (OUTPATIENT)
Dept: WOUND CARE | Facility: HOSPITAL | Age: 71
Discharge: HOME | End: 2021-07-02
Attending: PODIATRIST
Payer: MEDICARE

## 2021-07-02 VITALS — DIASTOLIC BLOOD PRESSURE: 84 MMHG | SYSTOLIC BLOOD PRESSURE: 140 MMHG | TEMPERATURE: 97 F | HEART RATE: 80 BPM

## 2021-07-02 DIAGNOSIS — L97.512 FOOT ULCERATION, RIGHT, WITH FAT LAYER EXPOSED: Primary | ICD-10-CM

## 2021-07-02 DIAGNOSIS — E11.43 TYPE II DIABETES MELLITUS WITH PERIPHERAL AUTONOMIC NEUROPATHY: ICD-10-CM

## 2021-07-02 PROCEDURE — 11042 WOUND DEBRIDEMENT: ICD-10-PCS | Mod: ,,, | Performed by: PODIATRIST

## 2021-07-02 PROCEDURE — 11042 DBRDMT SUBQ TIS 1ST 20SQCM/<: CPT | Mod: ,,, | Performed by: PODIATRIST

## 2021-07-02 PROCEDURE — 99499 UNLISTED E&M SERVICE: CPT | Mod: ,,, | Performed by: PODIATRIST

## 2021-07-02 PROCEDURE — 11042 DBRDMT SUBQ TIS 1ST 20SQCM/<: CPT | Performed by: PODIATRIST

## 2021-07-02 PROCEDURE — 27201912 HC WOUND CARE DEBRIDEMENT SUPPLIES: Performed by: PODIATRIST

## 2021-07-02 PROCEDURE — 99499 NO LOS: ICD-10-PCS | Mod: ,,, | Performed by: PODIATRIST

## 2021-07-05 LAB
FUNGUS SPEC CULT: NORMAL
FUNGUS SPEC CULT: NORMAL

## 2021-07-07 ENCOUNTER — OFFICE VISIT (OUTPATIENT)
Dept: INFECTIOUS DISEASES | Facility: CLINIC | Age: 71
End: 2021-07-07
Payer: MEDICARE

## 2021-07-07 VITALS
BODY MASS INDEX: 26.98 KG/M2 | SYSTOLIC BLOOD PRESSURE: 146 MMHG | DIASTOLIC BLOOD PRESSURE: 79 MMHG | HEIGHT: 69 IN | HEART RATE: 99 BPM | OXYGEN SATURATION: 98 % | WEIGHT: 182.19 LBS

## 2021-07-07 DIAGNOSIS — L97.512 FOOT ULCERATION, RIGHT, WITH FAT LAYER EXPOSED: Primary | ICD-10-CM

## 2021-07-07 DIAGNOSIS — Z79.2 RECEIVING INTRAVENOUS ANTIBIOTIC TREATMENT AS OUTPATIENT: ICD-10-CM

## 2021-07-07 DIAGNOSIS — Z79.2 ANTIBIOTIC LONG-TERM USE: ICD-10-CM

## 2021-07-07 DIAGNOSIS — M86.8X7 OTHER OSTEOMYELITIS OF RIGHT FOOT: ICD-10-CM

## 2021-07-07 DIAGNOSIS — A49.01 MSSA (METHICILLIN SUSCEPTIBLE STAPHYLOCOCCUS AUREUS) INFECTION: ICD-10-CM

## 2021-07-07 DIAGNOSIS — L03.031 CELLULITIS OF GREAT TOE, RIGHT: ICD-10-CM

## 2021-07-07 PROCEDURE — 99215 PR OFFICE/OUTPT VISIT, EST, LEVL V, 40-54 MIN: ICD-10-PCS | Mod: S$GLB,,, | Performed by: STUDENT IN AN ORGANIZED HEALTH CARE EDUCATION/TRAINING PROGRAM

## 2021-07-07 PROCEDURE — 3046F PR MOST RECENT HEMOGLOBIN A1C LEVEL > 9.0%: ICD-10-PCS | Mod: CPTII,S$GLB,, | Performed by: STUDENT IN AN ORGANIZED HEALTH CARE EDUCATION/TRAINING PROGRAM

## 2021-07-07 PROCEDURE — 99215 OFFICE O/P EST HI 40 MIN: CPT | Mod: S$GLB,,, | Performed by: STUDENT IN AN ORGANIZED HEALTH CARE EDUCATION/TRAINING PROGRAM

## 2021-07-07 PROCEDURE — 1101F PR PT FALLS ASSESS DOC 0-1 FALLS W/OUT INJ PAST YR: ICD-10-PCS | Mod: CPTII,S$GLB,, | Performed by: STUDENT IN AN ORGANIZED HEALTH CARE EDUCATION/TRAINING PROGRAM

## 2021-07-07 PROCEDURE — 3008F PR BODY MASS INDEX (BMI) DOCUMENTED: ICD-10-PCS | Mod: CPTII,S$GLB,, | Performed by: STUDENT IN AN ORGANIZED HEALTH CARE EDUCATION/TRAINING PROGRAM

## 2021-07-07 PROCEDURE — 99499 UNLISTED E&M SERVICE: CPT | Mod: S$PBB,HCNC,, | Performed by: STUDENT IN AN ORGANIZED HEALTH CARE EDUCATION/TRAINING PROGRAM

## 2021-07-07 PROCEDURE — 1125F AMNT PAIN NOTED PAIN PRSNT: CPT | Mod: S$GLB,,, | Performed by: STUDENT IN AN ORGANIZED HEALTH CARE EDUCATION/TRAINING PROGRAM

## 2021-07-07 PROCEDURE — 3288F PR FALLS RISK ASSESSMENT DOCUMENTED: ICD-10-PCS | Mod: CPTII,S$GLB,, | Performed by: STUDENT IN AN ORGANIZED HEALTH CARE EDUCATION/TRAINING PROGRAM

## 2021-07-07 PROCEDURE — 99999 PR PBB SHADOW E&M-EST. PATIENT-LVL IV: CPT | Mod: PBBFAC,,,

## 2021-07-07 PROCEDURE — 1159F PR MEDICATION LIST DOCUMENTED IN MEDICAL RECORD: ICD-10-PCS | Mod: S$GLB,,, | Performed by: STUDENT IN AN ORGANIZED HEALTH CARE EDUCATION/TRAINING PROGRAM

## 2021-07-07 PROCEDURE — 1159F MED LIST DOCD IN RCRD: CPT | Mod: S$GLB,,, | Performed by: STUDENT IN AN ORGANIZED HEALTH CARE EDUCATION/TRAINING PROGRAM

## 2021-07-07 PROCEDURE — 99499 RISK ADDL DX/OHS AUDIT: ICD-10-PCS | Mod: S$PBB,HCNC,, | Performed by: STUDENT IN AN ORGANIZED HEALTH CARE EDUCATION/TRAINING PROGRAM

## 2021-07-07 PROCEDURE — 1125F PR PAIN SEVERITY QUANTIFIED, PAIN PRESENT: ICD-10-PCS | Mod: S$GLB,,, | Performed by: STUDENT IN AN ORGANIZED HEALTH CARE EDUCATION/TRAINING PROGRAM

## 2021-07-07 PROCEDURE — 3046F HEMOGLOBIN A1C LEVEL >9.0%: CPT | Mod: CPTII,S$GLB,, | Performed by: STUDENT IN AN ORGANIZED HEALTH CARE EDUCATION/TRAINING PROGRAM

## 2021-07-07 PROCEDURE — 1101F PT FALLS ASSESS-DOCD LE1/YR: CPT | Mod: CPTII,S$GLB,, | Performed by: STUDENT IN AN ORGANIZED HEALTH CARE EDUCATION/TRAINING PROGRAM

## 2021-07-07 PROCEDURE — 99999 PR PBB SHADOW E&M-EST. PATIENT-LVL IV: ICD-10-PCS | Mod: PBBFAC,,,

## 2021-07-07 PROCEDURE — 3288F FALL RISK ASSESSMENT DOCD: CPT | Mod: CPTII,S$GLB,, | Performed by: STUDENT IN AN ORGANIZED HEALTH CARE EDUCATION/TRAINING PROGRAM

## 2021-07-07 PROCEDURE — 3008F BODY MASS INDEX DOCD: CPT | Mod: CPTII,S$GLB,, | Performed by: STUDENT IN AN ORGANIZED HEALTH CARE EDUCATION/TRAINING PROGRAM

## 2021-07-07 RX ORDER — METRONIDAZOLE 500 MG/1
500 TABLET ORAL 3 TIMES DAILY
Qty: 126 TABLET | Refills: 0 | Status: SHIPPED | OUTPATIENT
Start: 2021-07-07 | End: 2021-08-17

## 2021-07-09 ENCOUNTER — HOSPITAL ENCOUNTER (OUTPATIENT)
Dept: WOUND CARE | Facility: HOSPITAL | Age: 71
Discharge: HOME | End: 2021-07-09
Attending: PODIATRIST
Payer: MEDICARE

## 2021-07-09 VITALS
DIASTOLIC BLOOD PRESSURE: 80 MMHG | RESPIRATION RATE: 18 BRPM | TEMPERATURE: 98 F | HEART RATE: 90 BPM | SYSTOLIC BLOOD PRESSURE: 198 MMHG

## 2021-07-09 DIAGNOSIS — L03.031 CELLULITIS OF GREAT TOE, RIGHT: ICD-10-CM
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
## 2021-07-09 DIAGNOSIS — S91.351A: ICD-10-CM

## 2021-07-09 DIAGNOSIS — L97.512 FOOT ULCERATION, RIGHT, WITH FAT LAYER EXPOSED: Primary | ICD-10-CM

## 2021-07-09 PROCEDURE — 11042 DBRDMT SUBQ TIS 1ST 20SQCM/<: CPT | Performed by: PODIATRIST

## 2021-07-09 PROCEDURE — 27201912 HC WOUND CARE DEBRIDEMENT SUPPLIES: Performed by: PODIATRIST

## 2021-07-09 PROCEDURE — 11042 DBRDMT SUBQ TIS 1ST 20SQCM/<: CPT | Mod: ,,, | Performed by: PODIATRIST

## 2021-07-09 PROCEDURE — 11042 WOUND DEBRIDEMENT: ICD-10-PCS | Mod: ,,, | Performed by: PODIATRIST

## 2021-07-12 ENCOUNTER — LAB VISIT (OUTPATIENT)
Dept: LAB | Facility: HOSPITAL | Age: 71
End: 2021-07-12
Attending: STUDENT IN AN ORGANIZED HEALTH CARE EDUCATION/TRAINING PROGRAM
Payer: MEDICARE

## 2021-07-12 DIAGNOSIS — L03.119 CELLULITIS AND ABSCESS OF FOOT, EXCEPT TOES: ICD-10-CM

## 2021-07-12 DIAGNOSIS — S91.351D: ICD-10-CM

## 2021-07-12 DIAGNOSIS — I27.20 PROGRESSIVE PULMONARY HYPERTENSION: ICD-10-CM

## 2021-07-12 DIAGNOSIS — L02.619 CELLULITIS AND ABSCESS OF FOOT, EXCEPT TOES: ICD-10-CM

## 2021-07-12 DIAGNOSIS — L03.031 ABSCESS OF FIFTH TOENAIL OF RIGHT FOOT: Primary | ICD-10-CM

## 2021-07-12 LAB
ANION GAP SERPL CALC-SCNC: 9 MMOL/L (ref 8–16)
BASOPHILS # BLD AUTO: 0.09 K/UL (ref 0–0.2)
BASOPHILS NFR BLD: 0.8 % (ref 0–1.9)
BUN SERPL-MCNC: 20 MG/DL (ref 8–23)
CALCIUM SERPL-MCNC: 9 MG/DL (ref 8.7–10.5)
CHLORIDE SERPL-SCNC: 106 MMOL/L (ref 95–110)
CO2 SERPL-SCNC: 22 MMOL/L (ref 23–29)
CREAT SERPL-MCNC: 1.2 MG/DL (ref 0.5–1.4)
CRP SERPL-MCNC: 0.8 MG/L (ref 0–8.2)
DIFFERENTIAL METHOD: ABNORMAL
EOSINOPHIL # BLD AUTO: 0.5 K/UL (ref 0–0.5)
EOSINOPHIL NFR BLD: 4.9 % (ref 0–8)
ERYTHROCYTE [DISTWIDTH] IN BLOOD BY AUTOMATED COUNT: 16.7 % (ref 11.5–14.5)
EST. GFR  (AFRICAN AMERICAN): 53 ML/MIN/1.73 M^2
EST. GFR  (NON AFRICAN AMERICAN): 46 ML/MIN/1.73 M^2
GLUCOSE SERPL-MCNC: 198 MG/DL (ref 70–110)
HCT VFR BLD AUTO: 29 % (ref 37–48.5)
HGB BLD-MCNC: 8.7 G/DL (ref 12–16)
IMM GRANULOCYTES # BLD AUTO: 0.02 K/UL (ref 0–0.04)
IMM GRANULOCYTES NFR BLD AUTO: 0.2 % (ref 0–0.5)
LYMPHOCYTES # BLD AUTO: 1.5 K/UL (ref 1–4.8)
LYMPHOCYTES NFR BLD: 14.3 % (ref 18–48)
MAGNESIUM SERPL-MCNC: 1.4 MG/DL (ref 1.6–2.6)
MCH RBC QN AUTO: 24.8 PG (ref 27–31)
MCHC RBC AUTO-ENTMCNC: 30 G/DL (ref 32–36)
MCV RBC AUTO: 83 FL (ref 82–98)
MONOCYTES # BLD AUTO: 0.8 K/UL (ref 0.3–1)
MONOCYTES NFR BLD: 7.2 % (ref 4–15)
NEUTROPHILS # BLD AUTO: 7.8 K/UL (ref 1.8–7.7)
NEUTROPHILS NFR BLD: 72.6 % (ref 38–73)
NRBC BLD-RTO: 0 /100 WBC
PLATELET # BLD AUTO: 316 K/UL (ref 150–450)
PMV BLD AUTO: 12.8 FL (ref 9.2–12.9)
POTASSIUM SERPL-SCNC: 4.7 MMOL/L (ref 3.5–5.1)
RBC # BLD AUTO: 3.51 M/UL (ref 4–5.4)
SODIUM SERPL-SCNC: 137 MMOL/L (ref 136–145)
WBC # BLD AUTO: 10.72 K/UL (ref 3.9–12.7)

## 2021-07-12 PROCEDURE — 36415 COLL VENOUS BLD VENIPUNCTURE: CPT | Performed by: STUDENT IN AN ORGANIZED HEALTH CARE EDUCATION/TRAINING PROGRAM

## 2021-07-12 PROCEDURE — 85025 COMPLETE CBC W/AUTO DIFF WBC: CPT | Performed by: STUDENT IN AN ORGANIZED HEALTH CARE EDUCATION/TRAINING PROGRAM

## 2021-07-12 PROCEDURE — 83735 ASSAY OF MAGNESIUM: CPT | Performed by: STUDENT IN AN ORGANIZED HEALTH CARE EDUCATION/TRAINING PROGRAM

## 2021-07-12 PROCEDURE — 80048 BASIC METABOLIC PNL TOTAL CA: CPT | Performed by: STUDENT IN AN ORGANIZED HEALTH CARE EDUCATION/TRAINING PROGRAM

## 2021-07-12 PROCEDURE — 86140 C-REACTIVE PROTEIN: CPT | Performed by: STUDENT IN AN ORGANIZED HEALTH CARE EDUCATION/TRAINING PROGRAM

## 2021-07-16 ENCOUNTER — HOSPITAL ENCOUNTER (OUTPATIENT)
Dept: WOUND CARE | Facility: HOSPITAL | Age: 71
Discharge: HOME | End: 2021-07-16
Attending: PODIATRIST
Payer: MEDICARE

## 2021-07-16 ENCOUNTER — TELEPHONE (OUTPATIENT)
Dept: INFECTIOUS DISEASES | Facility: CLINIC | Age: 71
End: 2021-07-16

## 2021-07-16 VITALS — DIASTOLIC BLOOD PRESSURE: 86 MMHG | SYSTOLIC BLOOD PRESSURE: 209 MMHG | HEART RATE: 93 BPM | TEMPERATURE: 98 F

## 2021-07-16 DIAGNOSIS — L97.512 FOOT ULCERATION, RIGHT, WITH FAT LAYER EXPOSED: Primary | ICD-10-CM

## 2021-07-16 DIAGNOSIS — E11.51 PERIPHERAL VASCULAR DISEASE IN DIABETES MELLITUS: ICD-10-CM

## 2021-07-16 PROCEDURE — 11042 DBRDMT SUBQ TIS 1ST 20SQCM/<: CPT | Performed by: PODIATRIST

## 2021-07-16 PROCEDURE — 99499 UNLISTED E&M SERVICE: CPT | Mod: ,,, | Performed by: PODIATRIST

## 2021-07-16 PROCEDURE — 11042 DBRDMT SUBQ TIS 1ST 20SQCM/<: CPT | Mod: ,,, | Performed by: PODIATRIST

## 2021-07-16 PROCEDURE — 99499 NO LOS: ICD-10-PCS | Mod: ,,, | Performed by: PODIATRIST

## 2021-07-16 PROCEDURE — 11042 WOUND DEBRIDEMENT: ICD-10-PCS | Mod: ,,, | Performed by: PODIATRIST

## 2021-07-16 PROCEDURE — 27201912 HC WOUND CARE DEBRIDEMENT SUPPLIES: Performed by: PODIATRIST

## 2021-07-17 ENCOUNTER — DOCUMENT SCAN (OUTPATIENT)
Dept: HOME HEALTH SERVICES | Facility: HOSPITAL | Age: 71
End: 2021-07-17
Payer: MEDICAID

## 2021-07-19 ENCOUNTER — TELEPHONE (OUTPATIENT)
Dept: WOUND CARE | Facility: HOSPITAL | Age: 71
End: 2021-07-19

## 2021-07-21 ENCOUNTER — OFFICE VISIT (OUTPATIENT)
Dept: PRIMARY CARE CLINIC | Facility: CLINIC | Age: 71
End: 2021-07-21
Payer: MEDICARE

## 2021-07-21 ENCOUNTER — DOCUMENT SCAN (OUTPATIENT)
Dept: HOME HEALTH SERVICES | Facility: HOSPITAL | Age: 71
End: 2021-07-21
Payer: MEDICAID

## 2021-07-21 ENCOUNTER — TELEPHONE (OUTPATIENT)
Dept: PRIMARY CARE CLINIC | Facility: CLINIC | Age: 71
End: 2021-07-21

## 2021-07-21 VITALS
DIASTOLIC BLOOD PRESSURE: 64 MMHG | WEIGHT: 179.44 LBS | TEMPERATURE: 98 F | HEIGHT: 69 IN | HEART RATE: 88 BPM | OXYGEN SATURATION: 96 % | SYSTOLIC BLOOD PRESSURE: 136 MMHG | BODY MASS INDEX: 26.58 KG/M2

## 2021-07-21 DIAGNOSIS — E11.3293 MILD NONPROLIFERATIVE DIABETIC RETINOPATHY OF BOTH EYES ASSOCIATED WITH TYPE 2 DIABETES MELLITUS, MACULAR EDEMA PRESENCE UNSPECIFIED: ICD-10-CM

## 2021-07-21 DIAGNOSIS — G25.81 RLS (RESTLESS LEGS SYNDROME): ICD-10-CM

## 2021-07-21 DIAGNOSIS — F41.9 ANXIETY: ICD-10-CM

## 2021-07-21 DIAGNOSIS — I25.2 HISTORY OF MYOCARDIAL INFARCTION: ICD-10-CM

## 2021-07-21 DIAGNOSIS — Z86.73 HISTORY OF TIA (TRANSIENT ISCHEMIC ATTACK): ICD-10-CM

## 2021-07-21 DIAGNOSIS — E11.51 PERIPHERAL VASCULAR DISEASE IN DIABETES MELLITUS: ICD-10-CM

## 2021-07-21 DIAGNOSIS — M86.9 OSTEOMYELITIS OF LEFT FOOT, UNSPECIFIED TYPE: ICD-10-CM

## 2021-07-21 DIAGNOSIS — L50.9 HIVES: ICD-10-CM

## 2021-07-21 DIAGNOSIS — I15.2 HYPERTENSION ASSOCIATED WITH DIABETES: ICD-10-CM

## 2021-07-21 DIAGNOSIS — E11.59 HYPERTENSION ASSOCIATED WITH DIABETES: ICD-10-CM

## 2021-07-21 DIAGNOSIS — I25.118 CORONARY ARTERY DISEASE OF NATIVE ARTERY OF NATIVE HEART WITH STABLE ANGINA PECTORIS: ICD-10-CM

## 2021-07-21 DIAGNOSIS — I25.10 CORONARY ARTERY DISEASE INVOLVING NATIVE CORONARY ARTERY OF NATIVE HEART WITHOUT ANGINA PECTORIS: ICD-10-CM

## 2021-07-21 DIAGNOSIS — M85.859 OSTEOPENIA OF NECK OF FEMUR, UNSPECIFIED LATERALITY: ICD-10-CM

## 2021-07-21 DIAGNOSIS — I50.32 DIASTOLIC DYSFUNCTION WITH CHRONIC HEART FAILURE: ICD-10-CM

## 2021-07-21 DIAGNOSIS — C82.90 FOLLICULAR LYMPHOMA, UNSPECIFIED FOLLICULAR LYMPHOMA TYPE, UNSPECIFIED BODY REGION: ICD-10-CM

## 2021-07-21 DIAGNOSIS — I27.20 PULMONARY HYPERTENSION: ICD-10-CM

## 2021-07-21 DIAGNOSIS — F33.41 RECURRENT MAJOR DEPRESSIVE DISORDER, IN PARTIAL REMISSION: ICD-10-CM

## 2021-07-21 DIAGNOSIS — D50.9 IRON DEFICIENCY ANEMIA, UNSPECIFIED IRON DEFICIENCY ANEMIA TYPE: ICD-10-CM

## 2021-07-21 DIAGNOSIS — I70.0 AORTIC ATHEROSCLEROSIS: ICD-10-CM

## 2021-07-21 PROBLEM — H26.9 CATARACT: Status: RESOLVED | Noted: 2020-11-07 | Resolved: 2021-07-21

## 2021-07-21 PROCEDURE — 99215 OFFICE O/P EST HI 40 MIN: CPT | Mod: S$GLB,,, | Performed by: INTERNAL MEDICINE

## 2021-07-21 PROCEDURE — 3046F PR MOST RECENT HEMOGLOBIN A1C LEVEL > 9.0%: ICD-10-PCS | Mod: CPTII,S$GLB,, | Performed by: INTERNAL MEDICINE

## 2021-07-21 PROCEDURE — 99499 RISK ADDL DX/OHS AUDIT: ICD-10-PCS | Mod: S$GLB,,, | Performed by: INTERNAL MEDICINE

## 2021-07-21 PROCEDURE — 3078F PR MOST RECENT DIASTOLIC BLOOD PRESSURE < 80 MM HG: ICD-10-PCS | Mod: CPTII,S$GLB,, | Performed by: INTERNAL MEDICINE

## 2021-07-21 PROCEDURE — 3046F HEMOGLOBIN A1C LEVEL >9.0%: CPT | Mod: CPTII,S$GLB,, | Performed by: INTERNAL MEDICINE

## 2021-07-21 PROCEDURE — 99499 UNLISTED E&M SERVICE: CPT | Mod: S$GLB,,, | Performed by: INTERNAL MEDICINE

## 2021-07-21 PROCEDURE — 3078F DIAST BP <80 MM HG: CPT | Mod: CPTII,S$GLB,, | Performed by: INTERNAL MEDICINE

## 2021-07-21 PROCEDURE — 3075F PR MOST RECENT SYSTOLIC BLOOD PRESS GE 130-139MM HG: ICD-10-PCS | Mod: CPTII,S$GLB,, | Performed by: INTERNAL MEDICINE

## 2021-07-21 PROCEDURE — 1101F PR PT FALLS ASSESS DOC 0-1 FALLS W/OUT INJ PAST YR: ICD-10-PCS | Mod: CPTII,S$GLB,, | Performed by: INTERNAL MEDICINE

## 2021-07-21 PROCEDURE — 99215 PR OFFICE/OUTPT VISIT, EST, LEVL V, 40-54 MIN: ICD-10-PCS | Mod: S$GLB,,, | Performed by: INTERNAL MEDICINE

## 2021-07-21 PROCEDURE — 3008F BODY MASS INDEX DOCD: CPT | Mod: CPTII,S$GLB,, | Performed by: INTERNAL MEDICINE

## 2021-07-21 PROCEDURE — 1160F RVW MEDS BY RX/DR IN RCRD: CPT | Mod: CPTII,S$GLB,, | Performed by: INTERNAL MEDICINE

## 2021-07-21 PROCEDURE — 3008F PR BODY MASS INDEX (BMI) DOCUMENTED: ICD-10-PCS | Mod: CPTII,S$GLB,, | Performed by: INTERNAL MEDICINE

## 2021-07-21 PROCEDURE — 1126F AMNT PAIN NOTED NONE PRSNT: CPT | Mod: CPTII,S$GLB,, | Performed by: INTERNAL MEDICINE

## 2021-07-21 PROCEDURE — 1159F PR MEDICATION LIST DOCUMENTED IN MEDICAL RECORD: ICD-10-PCS | Mod: CPTII,S$GLB,, | Performed by: INTERNAL MEDICINE

## 2021-07-21 PROCEDURE — 3075F SYST BP GE 130 - 139MM HG: CPT | Mod: CPTII,S$GLB,, | Performed by: INTERNAL MEDICINE

## 2021-07-21 PROCEDURE — 3288F PR FALLS RISK ASSESSMENT DOCUMENTED: ICD-10-PCS | Mod: CPTII,S$GLB,, | Performed by: INTERNAL MEDICINE

## 2021-07-21 PROCEDURE — 1126F PR PAIN SEVERITY QUANTIFIED, NO PAIN PRESENT: ICD-10-PCS | Mod: CPTII,S$GLB,, | Performed by: INTERNAL MEDICINE

## 2021-07-21 PROCEDURE — 1159F MED LIST DOCD IN RCRD: CPT | Mod: CPTII,S$GLB,, | Performed by: INTERNAL MEDICINE

## 2021-07-21 PROCEDURE — 3288F FALL RISK ASSESSMENT DOCD: CPT | Mod: CPTII,S$GLB,, | Performed by: INTERNAL MEDICINE

## 2021-07-21 PROCEDURE — 1160F PR REVIEW ALL MEDS BY PRESCRIBER/CLIN PHARMACIST DOCUMENTED: ICD-10-PCS | Mod: CPTII,S$GLB,, | Performed by: INTERNAL MEDICINE

## 2021-07-21 PROCEDURE — 1101F PT FALLS ASSESS-DOCD LE1/YR: CPT | Mod: CPTII,S$GLB,, | Performed by: INTERNAL MEDICINE

## 2021-07-21 RX ORDER — EPINEPHRINE 0.3 MG/.3ML
1 INJECTION SUBCUTANEOUS ONCE
Qty: 0.3 ML | Refills: 1 | Status: SHIPPED | OUTPATIENT
Start: 2021-07-21 | End: 2023-02-15

## 2021-07-21 RX ORDER — FLUOXETINE HYDROCHLORIDE 20 MG/1
20 CAPSULE ORAL DAILY
Qty: 90 CAPSULE | Refills: 1 | Status: SHIPPED | OUTPATIENT
Start: 2021-07-21 | End: 2021-10-22 | Stop reason: SDUPTHER

## 2021-07-21 RX ORDER — NAPROXEN SODIUM 220 MG/1
81 TABLET, FILM COATED ORAL DAILY
Qty: 90 TABLET | Refills: 3 | Status: SHIPPED | OUTPATIENT
Start: 2021-07-21 | End: 2023-06-22

## 2021-07-22 LAB
ACID FAST MOD KINY STN SPEC: NORMAL
ACID FAST MOD KINY STN SPEC: NORMAL
MYCOBACTERIUM SPEC QL CULT: NORMAL
MYCOBACTERIUM SPEC QL CULT: NORMAL

## 2021-07-23 ENCOUNTER — HOSPITAL ENCOUNTER (OUTPATIENT)
Dept: WOUND CARE | Facility: HOSPITAL | Age: 71
Discharge: HOME | End: 2021-07-23
Attending: PODIATRIST
Payer: MEDICARE

## 2021-07-23 ENCOUNTER — OUTPATIENT CASE MANAGEMENT (OUTPATIENT)
Dept: ADMINISTRATIVE | Facility: OTHER | Age: 71
End: 2021-07-23

## 2021-07-23 VITALS — HEART RATE: 89 BPM | SYSTOLIC BLOOD PRESSURE: 181 MMHG | DIASTOLIC BLOOD PRESSURE: 73 MMHG | TEMPERATURE: 98 F

## 2021-07-23 DIAGNOSIS — L97.512 FOOT ULCERATION, RIGHT, WITH FAT LAYER EXPOSED: Primary | ICD-10-CM

## 2021-07-23 PROCEDURE — 11042 DBRDMT SUBQ TIS 1ST 20SQCM/<: CPT | Performed by: PODIATRIST

## 2021-07-23 PROCEDURE — 27201912 HC WOUND CARE DEBRIDEMENT SUPPLIES: Performed by: PODIATRIST

## 2021-07-23 PROCEDURE — 99499 NO LOS: ICD-10-PCS | Mod: ,,, | Performed by: PODIATRIST

## 2021-07-23 PROCEDURE — 11042 DBRDMT SUBQ TIS 1ST 20SQCM/<: CPT | Mod: ,,, | Performed by: PODIATRIST

## 2021-07-23 PROCEDURE — 99499 UNLISTED E&M SERVICE: CPT | Mod: ,,, | Performed by: PODIATRIST

## 2021-07-23 PROCEDURE — 11042 WOUND DEBRIDEMENT: ICD-10-PCS | Mod: ,,, | Performed by: PODIATRIST

## 2021-07-26 ENCOUNTER — TELEPHONE (OUTPATIENT)
Dept: BEHAVIORAL HEALTH | Facility: CLINIC | Age: 71
End: 2021-07-26

## 2021-07-27 ENCOUNTER — TELEPHONE (OUTPATIENT)
Dept: WOUND CARE | Facility: HOSPITAL | Age: 71
End: 2021-07-27

## 2021-07-27 PROBLEM — M86.9 OSTEOMYELITIS OF FOOT: Status: ACTIVE | Noted: 2021-07-27

## 2021-07-27 PROCEDURE — 99358 PROLONG SERVICE W/O CONTACT: CPT | Mod: S$GLB,,, | Performed by: INTERNAL MEDICINE

## 2021-07-27 PROCEDURE — 99358 PR PROLONGED SERV,NO CONTACT,1ST HR: ICD-10-PCS | Mod: S$GLB,,, | Performed by: INTERNAL MEDICINE

## 2021-07-30 ENCOUNTER — TELEPHONE (OUTPATIENT)
Dept: INFECTIOUS DISEASES | Facility: CLINIC | Age: 71
End: 2021-07-30

## 2021-07-30 ENCOUNTER — HOSPITAL ENCOUNTER (OUTPATIENT)
Dept: WOUND CARE | Facility: HOSPITAL | Age: 71
Discharge: HOME | End: 2021-07-30
Attending: PODIATRIST
Payer: MEDICARE

## 2021-07-30 ENCOUNTER — TELEPHONE (OUTPATIENT)
Dept: PRIMARY CARE CLINIC | Facility: CLINIC | Age: 71
End: 2021-07-30

## 2021-07-30 VITALS
TEMPERATURE: 98 F | SYSTOLIC BLOOD PRESSURE: 181 MMHG | DIASTOLIC BLOOD PRESSURE: 75 MMHG | RESPIRATION RATE: 20 BRPM | HEART RATE: 81 BPM

## 2021-07-30 DIAGNOSIS — L97.512 FOOT ULCERATION, RIGHT, WITH FAT LAYER EXPOSED: Primary | ICD-10-CM

## 2021-07-30 PROCEDURE — 11042 DBRDMT SUBQ TIS 1ST 20SQCM/<: CPT | Performed by: PODIATRIST

## 2021-07-30 PROCEDURE — 27201912 HC WOUND CARE DEBRIDEMENT SUPPLIES: Performed by: PODIATRIST

## 2021-07-30 PROCEDURE — 99499 NO LOS: ICD-10-PCS | Mod: ,,, | Performed by: PODIATRIST

## 2021-07-30 PROCEDURE — 11042 DBRDMT SUBQ TIS 1ST 20SQCM/<: CPT | Mod: ,,, | Performed by: PODIATRIST

## 2021-07-30 PROCEDURE — 99499 UNLISTED E&M SERVICE: CPT | Mod: ,,, | Performed by: PODIATRIST

## 2021-07-30 PROCEDURE — 11042 WOUND DEBRIDEMENT: ICD-10-PCS | Mod: ,,, | Performed by: PODIATRIST

## 2021-07-30 RX ORDER — FLUCONAZOLE 150 MG/1
150 TABLET ORAL DAILY
Qty: 2 TABLET | Refills: 0 | Status: SHIPPED | OUTPATIENT
Start: 2021-07-30 | End: 2021-08-01

## 2021-07-30 NOTE — TELEPHONE ENCOUNTER
----- Message from Vangie Jarrell sent at 7/29/2021  4:51 PM CDT -----  Pharmacy is calling to clarify an RX.  RX name:  EPINEPHrine (EPIPEN) 0.3 mg/0.3 mL AtIn        What do they need to clarify:  medication on comes as a two pack    Comments:    Please call the pharmacy to verify if that is ok due to the provider requesting to have the pt with one pin.     [facility] - [place], [place] - [address]   Phone:  [phone]  Fax:  [phone]

## 2021-08-02 ENCOUNTER — DOCUMENT SCAN (OUTPATIENT)
Dept: HOME HEALTH SERVICES | Facility: HOSPITAL | Age: 71
End: 2021-08-02
Payer: MEDICAID

## 2021-08-02 ENCOUNTER — TELEPHONE (OUTPATIENT)
Dept: BEHAVIORAL HEALTH | Facility: CLINIC | Age: 71
End: 2021-08-02

## 2021-08-04 ENCOUNTER — DOCUMENT SCAN (OUTPATIENT)
Dept: HOME HEALTH SERVICES | Facility: HOSPITAL | Age: 71
End: 2021-08-04
Payer: MEDICAID

## 2021-08-05 ENCOUNTER — TELEPHONE (OUTPATIENT)
Dept: WOUND CARE | Facility: HOSPITAL | Age: 71
End: 2021-08-05

## 2021-08-05 ENCOUNTER — TELEPHONE (OUTPATIENT)
Dept: BEHAVIORAL HEALTH | Facility: CLINIC | Age: 71
End: 2021-08-05

## 2021-08-05 ENCOUNTER — HOSPITAL ENCOUNTER (OUTPATIENT)
Dept: WOUND CARE | Facility: HOSPITAL | Age: 71
Discharge: HOME | End: 2021-08-05
Attending: FAMILY MEDICINE
Payer: MEDICARE

## 2021-08-05 VITALS
RESPIRATION RATE: 20 BRPM | TEMPERATURE: 98 F | HEART RATE: 89 BPM | SYSTOLIC BLOOD PRESSURE: 171 MMHG | DIASTOLIC BLOOD PRESSURE: 71 MMHG

## 2021-08-05 DIAGNOSIS — L97.512 FOOT ULCERATION, RIGHT, WITH FAT LAYER EXPOSED: Primary | ICD-10-CM

## 2021-08-05 PROCEDURE — 99214 OFFICE O/P EST MOD 30 MIN: CPT | Mod: HCNC,,, | Performed by: FAMILY MEDICINE

## 2021-08-05 PROCEDURE — 99213 OFFICE O/P EST LOW 20 MIN: CPT | Mod: HCNC | Performed by: FAMILY MEDICINE

## 2021-08-05 PROCEDURE — 99214 PR OFFICE/OUTPT VISIT, EST, LEVL IV, 30-39 MIN: ICD-10-PCS | Mod: HCNC,,, | Performed by: FAMILY MEDICINE

## 2021-08-05 PROCEDURE — 99213 OFFICE O/P EST LOW 20 MIN: CPT | Mod: HCNC

## 2021-08-05 RX ORDER — PANTOPRAZOLE SODIUM 40 MG/1
40 TABLET, DELAYED RELEASE ORAL DAILY
Qty: 90 TABLET | Refills: 0 | Status: SHIPPED | OUTPATIENT
Start: 2021-08-05 | End: 2021-10-22 | Stop reason: SDUPTHER

## 2021-08-06 PROCEDURE — G0180 MD CERTIFICATION HHA PATIENT: HCPCS | Mod: ,,, | Performed by: PODIATRIST

## 2021-08-06 PROCEDURE — G0180 PR HOME HEALTH MD CERTIFICATION: ICD-10-PCS | Mod: ,,, | Performed by: PODIATRIST

## 2021-08-10 PROBLEM — I70.0 AORTIC ATHEROSCLEROSIS: Status: ACTIVE | Noted: 2021-08-10

## 2021-08-10 PROBLEM — D50.9 IRON DEFICIENCY ANEMIA: Status: ACTIVE | Noted: 2021-06-01

## 2021-08-10 PROBLEM — M85.859 OSTEOPENIA OF NECK OF FEMUR: Status: ACTIVE | Noted: 2021-08-10

## 2021-08-10 PROBLEM — I50.32 DIASTOLIC DYSFUNCTION WITH CHRONIC HEART FAILURE: Status: ACTIVE | Noted: 2021-08-10
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
## 2021-08-10 PROBLEM — L03.031 CELLULITIS OF GREAT TOE, RIGHT: Status: RESOLVED | Noted: 2021-06-01 | Resolved: 2021-08-10

## 2021-08-10 PROBLEM — E11.29 PERSISTENT MICROALBUMINURIA ASSOCIATED WITH TYPE 2 DIABETES MELLITUS: Chronic | Status: RESOLVED | Noted: 2017-05-05 | Resolved: 2021-08-10

## 2021-08-10 PROBLEM — R80.9 PERSISTENT MICROALBUMINURIA ASSOCIATED WITH TYPE 2 DIABETES MELLITUS: Chronic | Status: RESOLVED | Noted: 2017-05-05 | Resolved: 2021-08-10

## 2021-08-10 PROCEDURE — 99358 PROLONG SERVICE W/O CONTACT: CPT | Mod: S$GLB,,, | Performed by: INTERNAL MEDICINE

## 2021-08-10 PROCEDURE — 99358 PR PROLONGED SERV,NO CONTACT,1ST HR: ICD-10-PCS | Mod: S$GLB,,, | Performed by: INTERNAL MEDICINE

## 2021-08-11 ENCOUNTER — TELEPHONE (OUTPATIENT)
Dept: PRIMARY CARE CLINIC | Facility: CLINIC | Age: 71
End: 2021-08-11

## 2021-08-11 PROBLEM — E11.3293 MILD NONPROLIFERATIVE DIABETIC RETINOPATHY OF BOTH EYES ASSOCIATED WITH TYPE 2 DIABETES MELLITUS: Status: ACTIVE | Noted: 2017-03-07

## 2021-08-13 ENCOUNTER — HOSPITAL ENCOUNTER (OUTPATIENT)
Dept: WOUND CARE | Facility: HOSPITAL | Age: 71
Discharge: HOME | End: 2021-08-13
Attending: PODIATRIST
Payer: MEDICARE

## 2021-08-13 ENCOUNTER — EXTERNAL HOME HEALTH (OUTPATIENT)
Dept: HOME HEALTH SERVICES | Facility: HOSPITAL | Age: 71
End: 2021-08-13
Payer: MEDICARE

## 2021-08-13 ENCOUNTER — TELEPHONE (OUTPATIENT)
Dept: WOUND CARE | Facility: HOSPITAL | Age: 71
End: 2021-08-13

## 2021-08-13 VITALS
HEART RATE: 95 BPM | DIASTOLIC BLOOD PRESSURE: 67 MMHG | SYSTOLIC BLOOD PRESSURE: 156 MMHG | RESPIRATION RATE: 20 BRPM | TEMPERATURE: 97 F

## 2021-08-13 DIAGNOSIS — L97.512 FOOT ULCERATION, RIGHT, WITH FAT LAYER EXPOSED: Primary | ICD-10-CM

## 2021-08-13 PROCEDURE — 11042 WOUND DEBRIDEMENT: ICD-10-PCS | Mod: ,,, | Performed by: PODIATRIST

## 2021-08-13 PROCEDURE — 99499 NO LOS: ICD-10-PCS | Mod: ,,, | Performed by: PODIATRIST

## 2021-08-13 PROCEDURE — 99499 UNLISTED E&M SERVICE: CPT | Mod: ,,, | Performed by: PODIATRIST

## 2021-08-13 PROCEDURE — 11042 DBRDMT SUBQ TIS 1ST 20SQCM/<: CPT | Mod: ,,, | Performed by: PODIATRIST

## 2021-08-13 PROCEDURE — 27201912 HC WOUND CARE DEBRIDEMENT SUPPLIES: Performed by: PODIATRIST

## 2021-08-13 PROCEDURE — 11042 DBRDMT SUBQ TIS 1ST 20SQCM/<: CPT | Performed by: PODIATRIST

## 2021-08-17 ENCOUNTER — TELEPHONE (OUTPATIENT)
Dept: PODIATRY | Facility: CLINIC | Age: 71
End: 2021-08-17

## 2021-08-17 ENCOUNTER — DOCUMENT SCAN (OUTPATIENT)
Dept: HOME HEALTH SERVICES | Facility: HOSPITAL | Age: 71
End: 2021-08-17
Payer: MEDICAID

## 2021-08-17 RX ORDER — AMOXICILLIN AND CLAVULANATE POTASSIUM 875; 125 MG/1; MG/1
1 TABLET, FILM COATED ORAL EVERY 12 HOURS
Qty: 20 TABLET | Refills: 0 | Status: SHIPPED | OUTPATIENT
Start: 2021-08-17 | End: 2021-08-27

## 2021-08-20 ENCOUNTER — DOCUMENT SCAN (OUTPATIENT)
Dept: HOME HEALTH SERVICES | Facility: HOSPITAL | Age: 71
End: 2021-08-20
Payer: MEDICAID

## 2021-08-20 ENCOUNTER — HOSPITAL ENCOUNTER (OUTPATIENT)
Dept: WOUND CARE | Facility: HOSPITAL | Age: 71
Discharge: HOME | End: 2021-08-20
Attending: PODIATRIST
Payer: MEDICARE

## 2021-08-20 VITALS — DIASTOLIC BLOOD PRESSURE: 84 MMHG | TEMPERATURE: 98 F | SYSTOLIC BLOOD PRESSURE: 132 MMHG | HEART RATE: 86 BPM

## 2021-08-20 DIAGNOSIS — L97.512 FOOT ULCERATION, RIGHT, WITH FAT LAYER EXPOSED: Primary | ICD-10-CM

## 2021-08-20 PROCEDURE — 11042 WOUND DEBRIDEMENT: ICD-10-PCS | Mod: ,,, | Performed by: PODIATRIST

## 2021-08-20 PROCEDURE — 11042 DBRDMT SUBQ TIS 1ST 20SQCM/<: CPT | Mod: ,,, | Performed by: PODIATRIST

## 2021-08-20 PROCEDURE — 99499 UNLISTED E&M SERVICE: CPT | Mod: ,,, | Performed by: PODIATRIST

## 2021-08-20 PROCEDURE — 11042 DBRDMT SUBQ TIS 1ST 20SQCM/<: CPT

## 2021-08-20 PROCEDURE — 99499 NO LOS: ICD-10-PCS | Mod: ,,, | Performed by: PODIATRIST

## 2021-08-20 PROCEDURE — 27201912 HC WOUND CARE DEBRIDEMENT SUPPLIES: Performed by: PODIATRIST

## 2021-08-20 PROCEDURE — 27201912 HC WOUND CARE DEBRIDEMENT SUPPLIES

## 2021-08-20 PROCEDURE — 11042 DBRDMT SUBQ TIS 1ST 20SQCM/<: CPT | Performed by: PODIATRIST

## 2021-08-25 ENCOUNTER — DOCUMENT SCAN (OUTPATIENT)
Dept: HOME HEALTH SERVICES | Facility: HOSPITAL | Age: 71
End: 2021-08-25
Payer: MEDICAID

## 2021-09-13 ENCOUNTER — DOCUMENT SCAN (OUTPATIENT)
Dept: HOME HEALTH SERVICES | Facility: HOSPITAL | Age: 71
End: 2021-09-13
Payer: MEDICAID

## 2021-09-15 ENCOUNTER — HOSPITAL ENCOUNTER (EMERGENCY)
Facility: HOSPITAL | Age: 71
Discharge: HOME | End: 2021-09-15
Attending: EMERGENCY MEDICINE
Payer: MEDICARE

## 2021-09-15 VITALS
TEMPERATURE: 99 F | RESPIRATION RATE: 17 BRPM | SYSTOLIC BLOOD PRESSURE: 165 MMHG | HEART RATE: 76 BPM | OXYGEN SATURATION: 100 % | HEIGHT: 69 IN | DIASTOLIC BLOOD PRESSURE: 74 MMHG | WEIGHT: 179 LBS | BODY MASS INDEX: 26.51 KG/M2

## 2021-09-15 DIAGNOSIS — K52.9 GASTROENTERITIS: Primary | ICD-10-CM

## 2021-09-15 DIAGNOSIS — R07.89 ATYPICAL CHEST PAIN: ICD-10-CM

## 2021-09-15 DIAGNOSIS — R07.9 CHEST PAIN: ICD-10-CM

## 2021-09-15 LAB
ALBUMIN SERPL BCP-MCNC: 3.2 G/DL (ref 3.5–5.2)
ALP SERPL-CCNC: 107 U/L (ref 55–135)
ALT SERPL W/O P-5'-P-CCNC: 19 U/L (ref 10–44)
ANION GAP SERPL CALC-SCNC: 12 MMOL/L (ref 8–16)
AST SERPL-CCNC: 25 U/L (ref 10–40)
BASOPHILS # BLD AUTO: 0.08 K/UL (ref 0–0.2)
BASOPHILS NFR BLD: 0.7 % (ref 0–1.9)
BILIRUB SERPL-MCNC: 0.4 MG/DL (ref 0.1–1)
BNP SERPL-MCNC: 93 PG/ML (ref 0–99)
BUN SERPL-MCNC: 24 MG/DL (ref 8–23)
CALCIUM SERPL-MCNC: 9.8 MG/DL (ref 8.7–10.5)
CHLORIDE SERPL-SCNC: 102 MMOL/L (ref 95–110)
CO2 SERPL-SCNC: 22 MMOL/L (ref 23–29)
CREAT SERPL-MCNC: 1.1 MG/DL (ref 0.5–1.4)
DIFFERENTIAL METHOD: ABNORMAL
EOSINOPHIL # BLD AUTO: 0.4 K/UL (ref 0–0.5)
EOSINOPHIL NFR BLD: 3.2 % (ref 0–8)
ERYTHROCYTE [DISTWIDTH] IN BLOOD BY AUTOMATED COUNT: 16.8 % (ref 11.5–14.5)
EST. GFR  (AFRICAN AMERICAN): 59 ML/MIN/1.73 M^2
EST. GFR  (NON AFRICAN AMERICAN): 51 ML/MIN/1.73 M^2
GLUCOSE SERPL-MCNC: 314 MG/DL (ref 70–110)
HCT VFR BLD AUTO: 28.6 % (ref 37–48.5)
HGB BLD-MCNC: 8.7 G/DL (ref 12–16)
IMM GRANULOCYTES # BLD AUTO: 0.08 K/UL (ref 0–0.04)
IMM GRANULOCYTES NFR BLD AUTO: 0.7 % (ref 0–0.5)
LIPASE SERPL-CCNC: 15 U/L (ref 4–60)
LYMPHOCYTES # BLD AUTO: 1.7 K/UL (ref 1–4.8)
LYMPHOCYTES NFR BLD: 13.9 % (ref 18–48)
MCH RBC QN AUTO: 23.1 PG (ref 27–31)
MCHC RBC AUTO-ENTMCNC: 30.4 G/DL (ref 32–36)
MCV RBC AUTO: 76 FL (ref 82–98)
MONOCYTES # BLD AUTO: 0.8 K/UL (ref 0.3–1)
MONOCYTES NFR BLD: 6.6 % (ref 4–15)
NEUTROPHILS # BLD AUTO: 9.1 K/UL (ref 1.8–7.7)
NEUTROPHILS NFR BLD: 74.9 % (ref 38–73)
NRBC BLD-RTO: 0 /100 WBC
PLATELET # BLD AUTO: 223 K/UL (ref 150–450)
PMV BLD AUTO: 12 FL (ref 9.2–12.9)
POTASSIUM SERPL-SCNC: 3.9 MMOL/L (ref 3.5–5.1)
PROT SERPL-MCNC: 7 G/DL (ref 6–8.4)
RBC # BLD AUTO: 3.76 M/UL (ref 4–5.4)
SODIUM SERPL-SCNC: 136 MMOL/L (ref 136–145)
TROPONIN I SERPL DL<=0.01 NG/ML-MCNC: 0.01 NG/ML (ref 0–0.03)
TROPONIN I SERPL DL<=0.01 NG/ML-MCNC: 0.01 NG/ML (ref 0–0.03)
WBC # BLD AUTO: 12.05 K/UL (ref 3.9–12.7)

## 2021-09-15 PROCEDURE — 84484 ASSAY OF TROPONIN QUANT: CPT | Mod: 91,HCNC | Performed by: EMERGENCY MEDICINE

## 2021-09-15 PROCEDURE — 99284 EMERGENCY DEPT VISIT MOD MDM: CPT | Mod: 25,HCNC

## 2021-09-15 PROCEDURE — 83880 ASSAY OF NATRIURETIC PEPTIDE: CPT | Mod: HCNC | Performed by: EMERGENCY MEDICINE

## 2021-09-15 PROCEDURE — 93010 EKG 12-LEAD: ICD-10-PCS | Mod: HCNC,,, | Performed by: INTERNAL MEDICINE

## 2021-09-15 PROCEDURE — 80053 COMPREHEN METABOLIC PANEL: CPT | Mod: HCNC | Performed by: EMERGENCY MEDICINE

## 2021-09-15 PROCEDURE — 96360 HYDRATION IV INFUSION INIT: CPT | Mod: HCNC

## 2021-09-15 PROCEDURE — 25000003 PHARM REV CODE 250: Mod: HCNC | Performed by: EMERGENCY MEDICINE

## 2021-09-15 PROCEDURE — 85025 COMPLETE CBC W/AUTO DIFF WBC: CPT | Mod: HCNC | Performed by: EMERGENCY MEDICINE

## 2021-09-15 PROCEDURE — 83690 ASSAY OF LIPASE: CPT | Mod: HCNC | Performed by: EMERGENCY MEDICINE

## 2021-09-15 PROCEDURE — 93005 ELECTROCARDIOGRAM TRACING: CPT | Mod: HCNC

## 2021-09-15 PROCEDURE — 93010 ELECTROCARDIOGRAM REPORT: CPT | Mod: HCNC,,, | Performed by: INTERNAL MEDICINE

## 2021-09-15 RX ORDER — ONDANSETRON 4 MG/1
4 TABLET, ORALLY DISINTEGRATING ORAL EVERY 8 HOURS PRN
Qty: 20 TABLET | Refills: 0 | Status: SHIPPED | OUTPATIENT
Start: 2021-09-15 | End: 2022-06-20

## 2021-09-15 RX ADMIN — SODIUM CHLORIDE 500 ML: 0.9 INJECTION, SOLUTION INTRAVENOUS at 01:09

## 2021-09-15 RX ADMIN — LIDOCAINE HYDROCHLORIDE: 20 SOLUTION ORAL; TOPICAL at 11:09

## 2021-10-05 PROCEDURE — G0179 MD RECERTIFICATION HHA PT: HCPCS | Mod: ,,, | Performed by: PODIATRIST

## 2021-10-05 PROCEDURE — G0179 PR HOME HEALTH MD RECERTIFICATION: ICD-10-PCS | Mod: ,,, | Performed by: PODIATRIST

## 2021-10-07 ENCOUNTER — TELEPHONE (OUTPATIENT)
Dept: PODIATRY | Facility: CLINIC | Age: 71
End: 2021-10-07

## 2021-10-07 NOTE — TELEPHONE ENCOUNTER
----- Message from Dalia Coughlin sent at 10/7/2021  1:48 PM CDT -----  Contact: pt [phone]  Patient needs to schedule an appt     Please call and schedule.    Thank You

## 2021-10-08 RX ORDER — INSULIN ASPART 100 [IU]/ML
30 INJECTION, SUSPENSION SUBCUTANEOUS
Qty: 4 BOX | Refills: 3 | Status: SHIPPED | OUTPATIENT
Start: 2021-10-08 | End: 2022-10-20 | Stop reason: ALTCHOICE

## 2021-10-18 ENCOUNTER — EXTERNAL HOME HEALTH (OUTPATIENT)
Dept: HOME HEALTH SERVICES | Facility: HOSPITAL | Age: 71
End: 2021-10-18
Payer: MEDICARE

## 2021-10-21 ENCOUNTER — DOCUMENT SCAN (OUTPATIENT)
Dept: HOME HEALTH SERVICES | Facility: HOSPITAL | Age: 71
End: 2021-10-21
Payer: MEDICAID

## 2021-10-22 ENCOUNTER — TELEPHONE (OUTPATIENT)
Dept: PRIMARY CARE CLINIC | Facility: CLINIC | Age: 71
End: 2021-10-22

## 2021-10-22 ENCOUNTER — LAB VISIT (OUTPATIENT)
Dept: LAB | Facility: HOSPITAL | Age: 71
End: 2021-10-22
Attending: INTERNAL MEDICINE
Payer: MEDICARE

## 2021-10-22 ENCOUNTER — OFFICE VISIT (OUTPATIENT)
Dept: PRIMARY CARE CLINIC | Facility: CLINIC | Age: 71
End: 2021-10-22
Payer: MEDICARE

## 2021-10-22 VITALS
WEIGHT: 177 LBS | TEMPERATURE: 98 F | BODY MASS INDEX: 26.22 KG/M2 | HEIGHT: 69 IN | HEART RATE: 86 BPM | OXYGEN SATURATION: 99 % | SYSTOLIC BLOOD PRESSURE: 146 MMHG | DIASTOLIC BLOOD PRESSURE: 70 MMHG

## 2021-10-22 DIAGNOSIS — E11.59 HYPERTENSION ASSOCIATED WITH DIABETES: ICD-10-CM

## 2021-10-22 DIAGNOSIS — F41.9 ANXIETY: ICD-10-CM

## 2021-10-22 DIAGNOSIS — I15.2 HYPERTENSION ASSOCIATED WITH DIABETES: ICD-10-CM

## 2021-10-22 DIAGNOSIS — E11.69 HYPERLIPIDEMIA ASSOCIATED WITH TYPE 2 DIABETES MELLITUS: Chronic | ICD-10-CM
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
## 2021-10-22 DIAGNOSIS — Z00.00 HEALTHCARE MAINTENANCE: ICD-10-CM

## 2021-10-22 DIAGNOSIS — F33.1 MODERATE EPISODE OF RECURRENT MAJOR DEPRESSIVE DISORDER: ICD-10-CM

## 2021-10-22 DIAGNOSIS — E78.5 HYPERLIPIDEMIA ASSOCIATED WITH TYPE 2 DIABETES MELLITUS: Chronic | ICD-10-CM

## 2021-10-22 DIAGNOSIS — S91.351D DOG BITE OF RIGHT FOOT, SUBSEQUENT ENCOUNTER: ICD-10-CM

## 2021-10-22 DIAGNOSIS — F33.41 RECURRENT MAJOR DEPRESSIVE DISORDER, IN PARTIAL REMISSION: Primary | ICD-10-CM

## 2021-10-22 LAB
CHOLEST SERPL-MCNC: 153 MG/DL (ref 120–199)
CHOLEST/HDLC SERPL: 3.3 {RATIO} (ref 2–5)
ESTIMATED AVG GLUCOSE: 349 MG/DL (ref 68–131)
HBA1C MFR BLD: 13.8 % (ref 4–5.6)
HDLC SERPL-MCNC: 47 MG/DL (ref 40–75)
HDLC SERPL: 30.7 % (ref 20–50)
LDLC SERPL CALC-MCNC: 64.2 MG/DL (ref 63–159)
NONHDLC SERPL-MCNC: 106 MG/DL
PTH-INTACT SERPL-MCNC: 66.5 PG/ML (ref 9–77)
TRIGL SERPL-MCNC: 209 MG/DL (ref 30–150)
TSH SERPL DL<=0.005 MIU/L-ACNC: 0.4 UIU/ML (ref 0.4–4)

## 2021-10-22 PROCEDURE — 99499 RISK ADDL DX/OHS AUDIT: ICD-10-PCS | Mod: HCNC,S$GLB,, | Performed by: INTERNAL MEDICINE

## 2021-10-22 PROCEDURE — 1125F PR PAIN SEVERITY QUANTIFIED, PAIN PRESENT: ICD-10-PCS | Mod: HCNC,CPTII,S$GLB, | Performed by: INTERNAL MEDICINE

## 2021-10-22 PROCEDURE — 3078F DIAST BP <80 MM HG: CPT | Mod: HCNC,CPTII,S$GLB, | Performed by: INTERNAL MEDICINE

## 2021-10-22 PROCEDURE — 3046F HEMOGLOBIN A1C LEVEL >9.0%: CPT | Mod: HCNC,CPTII,S$GLB, | Performed by: INTERNAL MEDICINE

## 2021-10-22 PROCEDURE — 99499 UNLISTED E&M SERVICE: CPT | Mod: HCNC,S$GLB,, | Performed by: INTERNAL MEDICINE

## 2021-10-22 PROCEDURE — 3077F SYST BP >= 140 MM HG: CPT | Mod: HCNC,CPTII,S$GLB, | Performed by: INTERNAL MEDICINE

## 2021-10-22 PROCEDURE — 1101F PT FALLS ASSESS-DOCD LE1/YR: CPT | Mod: HCNC,CPTII,S$GLB, | Performed by: INTERNAL MEDICINE

## 2021-10-22 PROCEDURE — 1160F RVW MEDS BY RX/DR IN RCRD: CPT | Mod: HCNC,CPTII,S$GLB, | Performed by: INTERNAL MEDICINE

## 2021-10-22 PROCEDURE — 3288F FALL RISK ASSESSMENT DOCD: CPT | Mod: HCNC,CPTII,S$GLB, | Performed by: INTERNAL MEDICINE

## 2021-10-22 PROCEDURE — 83970 ASSAY OF PARATHORMONE: CPT | Mod: HCNC | Performed by: INTERNAL MEDICINE

## 2021-10-22 PROCEDURE — 80061 LIPID PANEL: CPT | Mod: HCNC | Performed by: INTERNAL MEDICINE

## 2021-10-22 PROCEDURE — 99215 OFFICE O/P EST HI 40 MIN: CPT | Mod: HCNC,S$GLB,, | Performed by: INTERNAL MEDICINE

## 2021-10-22 PROCEDURE — 3078F PR MOST RECENT DIASTOLIC BLOOD PRESSURE < 80 MM HG: ICD-10-PCS | Mod: HCNC,CPTII,S$GLB, | Performed by: INTERNAL MEDICINE

## 2021-10-22 PROCEDURE — 3077F PR MOST RECENT SYSTOLIC BLOOD PRESSURE >= 140 MM HG: ICD-10-PCS | Mod: HCNC,CPTII,S$GLB, | Performed by: INTERNAL MEDICINE

## 2021-10-22 PROCEDURE — 3046F PR MOST RECENT HEMOGLOBIN A1C LEVEL > 9.0%: ICD-10-PCS | Mod: HCNC,CPTII,S$GLB, | Performed by: INTERNAL MEDICINE

## 2021-10-22 PROCEDURE — 36415 COLL VENOUS BLD VENIPUNCTURE: CPT | Mod: HCNC | Performed by: INTERNAL MEDICINE

## 2021-10-22 PROCEDURE — 83036 HEMOGLOBIN GLYCOSYLATED A1C: CPT | Mod: HCNC | Performed by: INTERNAL MEDICINE

## 2021-10-22 PROCEDURE — 1159F PR MEDICATION LIST DOCUMENTED IN MEDICAL RECORD: ICD-10-PCS | Mod: HCNC,CPTII,S$GLB, | Performed by: INTERNAL MEDICINE

## 2021-10-22 PROCEDURE — 3288F PR FALLS RISK ASSESSMENT DOCUMENTED: ICD-10-PCS | Mod: HCNC,CPTII,S$GLB, | Performed by: INTERNAL MEDICINE

## 2021-10-22 PROCEDURE — 1159F MED LIST DOCD IN RCRD: CPT | Mod: HCNC,CPTII,S$GLB, | Performed by: INTERNAL MEDICINE

## 2021-10-22 PROCEDURE — 99215 PR OFFICE/OUTPT VISIT, EST, LEVL V, 40-54 MIN: ICD-10-PCS | Mod: HCNC,S$GLB,, | Performed by: INTERNAL MEDICINE

## 2021-10-22 PROCEDURE — 1160F PR REVIEW ALL MEDS BY PRESCRIBER/CLIN PHARMACIST DOCUMENTED: ICD-10-PCS | Mod: HCNC,CPTII,S$GLB, | Performed by: INTERNAL MEDICINE

## 2021-10-22 PROCEDURE — 3008F BODY MASS INDEX DOCD: CPT | Mod: HCNC,CPTII,S$GLB, | Performed by: INTERNAL MEDICINE

## 2021-10-22 PROCEDURE — 1101F PR PT FALLS ASSESS DOC 0-1 FALLS W/OUT INJ PAST YR: ICD-10-PCS | Mod: HCNC,CPTII,S$GLB, | Performed by: INTERNAL MEDICINE

## 2021-10-22 PROCEDURE — 84443 ASSAY THYROID STIM HORMONE: CPT | Mod: HCNC | Performed by: INTERNAL MEDICINE

## 2021-10-22 PROCEDURE — 1125F AMNT PAIN NOTED PAIN PRSNT: CPT | Mod: HCNC,CPTII,S$GLB, | Performed by: INTERNAL MEDICINE

## 2021-10-22 PROCEDURE — 3008F PR BODY MASS INDEX (BMI) DOCUMENTED: ICD-10-PCS | Mod: HCNC,CPTII,S$GLB, | Performed by: INTERNAL MEDICINE

## 2021-10-22 RX ORDER — GABAPENTIN 300 MG/1
300 CAPSULE ORAL 3 TIMES DAILY
Qty: 90 CAPSULE | Refills: 0 | Status: SHIPPED | OUTPATIENT
Start: 2021-10-22 | End: 2022-02-01

## 2021-10-22 RX ORDER — ATENOLOL 50 MG/1
50 TABLET ORAL 2 TIMES DAILY
Qty: 180 TABLET | Refills: 3 | Status: SHIPPED | OUTPATIENT
Start: 2021-10-22 | End: 2022-05-26 | Stop reason: SDUPTHER

## 2021-10-22 RX ORDER — HYDROCHLOROTHIAZIDE 25 MG/1
25 TABLET ORAL DAILY
Qty: 90 TABLET | Refills: 3 | Status: SHIPPED | OUTPATIENT
Start: 2021-10-22 | End: 2022-05-26 | Stop reason: SDUPTHER

## 2021-10-22 RX ORDER — PANTOPRAZOLE SODIUM 40 MG/1
40 TABLET, DELAYED RELEASE ORAL DAILY
Qty: 90 TABLET | Refills: 0 | Status: SHIPPED | OUTPATIENT
Start: 2021-10-22 | End: 2022-02-01

## 2021-10-22 RX ORDER — ISOSORBIDE MONONITRATE 30 MG/1
30 TABLET, EXTENDED RELEASE ORAL DAILY
Qty: 90 TABLET | Refills: 3 | Status: SHIPPED | OUTPATIENT
Start: 2021-10-22 | End: 2022-05-26 | Stop reason: SDUPTHER

## 2021-10-22 RX ORDER — FLUOXETINE HYDROCHLORIDE 40 MG/1
40 CAPSULE ORAL DAILY
Qty: 90 CAPSULE | Refills: 3 | Status: SHIPPED | OUTPATIENT
Start: 2021-10-22 | End: 2022-05-26 | Stop reason: SDUPTHER

## 2021-10-22 RX ORDER — ATORVASTATIN CALCIUM 80 MG/1
80 TABLET, FILM COATED ORAL NIGHTLY
Qty: 90 TABLET | Refills: 3 | Status: SHIPPED | OUTPATIENT
Start: 2021-10-22 | End: 2022-05-26 | Stop reason: SDUPTHER

## 2021-10-26 ENCOUNTER — PATIENT OUTREACH (OUTPATIENT)
Dept: ADMINISTRATIVE | Facility: OTHER | Age: 71
End: 2021-10-26
Payer: MEDICAID

## 2021-10-26 ENCOUNTER — TELEPHONE (OUTPATIENT)
Dept: PRIMARY CARE CLINIC | Facility: CLINIC | Age: 71
End: 2021-10-26
Payer: MEDICAID

## 2021-10-26 DIAGNOSIS — Z12.31 BREAST CANCER SCREENING BY MAMMOGRAM: Primary | ICD-10-CM

## 2021-10-26 NOTE — TELEPHONE ENCOUNTER
----- Message from Amy Kulkarni MD sent at 10/25/2021  7:54 AM CDT -----  Please call patient with results.  Normal thyroid function.  Elevated triglycerides due to poorly controlled diabetes.  LDL at goal.  Normal parathyroid hormone.  Hemoglobin A1c significantly worse.  Glucose average 350. Please confirm that she has restarted her insulin.

## 2021-11-01 ENCOUNTER — TELEPHONE (OUTPATIENT)
Dept: PRIMARY CARE CLINIC | Facility: CLINIC | Age: 71
End: 2021-11-01

## 2021-11-12 ENCOUNTER — PATIENT OUTREACH (OUTPATIENT)
Dept: ADMINISTRATIVE | Facility: HOSPITAL | Age: 71
End: 2021-11-12
Payer: MEDICAID

## 2021-11-19 ENCOUNTER — TELEPHONE (OUTPATIENT)
Dept: PRIMARY CARE CLINIC | Facility: CLINIC | Age: 71
End: 2021-11-19

## 2021-11-19 NOTE — TELEPHONE ENCOUNTER
Patient has been without phone service for a few weeks, I was able to reach her today. Lab results given to patient and she did confirm that she restarted insulin.

## 2022-01-24 PROBLEM — Z00.00 HEALTHCARE MAINTENANCE: Status: RESOLVED | Noted: 2021-10-22 | Resolved: 2022-01-24

## 2022-01-26 ENCOUNTER — PATIENT MESSAGE (OUTPATIENT)
Dept: ADMINISTRATIVE | Facility: HOSPITAL | Age: 72
End: 2022-01-26
Payer: MEDICAID

## 2022-01-26 DIAGNOSIS — E11.9 TYPE 2 DIABETES MELLITUS WITHOUT COMPLICATION: ICD-10-CM

## 2022-01-26 DIAGNOSIS — I15.2 HYPERTENSION ASSOCIATED WITH DIABETES: ICD-10-CM

## 2022-01-26 DIAGNOSIS — E11.59 HYPERTENSION ASSOCIATED WITH DIABETES: ICD-10-CM

## 2022-02-01 ENCOUNTER — OUTPATIENT CASE MANAGEMENT (OUTPATIENT)
Dept: ADMINISTRATIVE | Facility: OTHER | Age: 72
End: 2022-02-01
Payer: MEDICAID

## 2022-02-01 NOTE — LETTER
February 8, 2022    Lorena NEIL Diane  [address]  [facility] [id number]             [facility] [address] [place] [address] Dear Mrs. Contreras:    Welcome to [facility] Complex Care Management Program.  It was a pleasure talking with you today.  My name is Palma Méndez and I look forward to being your Care Manager.  I am available Monday - Wednesday 8:00 am - 4:30 pm.   My goal is to help you function at the healthiest and highest level possible.  You can contact me directly at [phone].    As an [facility] patient with [employer], some of the services we may be able to provide include:      Development of an individualized care plan with a Registered Nurse    Connection with a    Connection with available resources and services     Coordinate communication among your care team members    Provide coaching and education    Help you understand your doctors treatment plan   Help you obtain information about your insurance coverage.     All services provided by [facility] Complex Care Managers and other care team members are coordinated with and communicated to your primary care team.    As part of your enrollment, you will be receiving education materials and more information about these services in your [facility] account, by phone or through the mail.  If you do not wish to participate or receive information, please contact our office at [phone].      Sincerely,          Palma Méndez RN, CCM [facility]   Out-patient RN Complex Care Manager   [phone] cell  [phone] office

## 2022-02-07 NOTE — PROGRESS NOTES
Outpatient Care Management  Initial Patient Assessment    Patient: Lorena Contreras  MRN: [id number]  Date of Service: 02/01/2022  Completed by: Palma Méndez RN  Referral Date: 01/26/2022  Program: Disease Management (Diabetes & COPD)    Reason for Visit   Patient presents with    OPCM Chart Review    OPCM RN First Assessment Attempt     2/1/2022    OPCM Enrollment Call    Initial Assessment    Nursing Assessment    Plan Of Care       Brief Summary:  Lorena Contreras was referred by dr. Kulkarni for management of hypertension  . Patient qualifies for program based on risk score of 84.2 %.   Active problem list, medical, surgical and social history reviewed.   Active comorbidities include dm, hypertension. Cad with stent and mi in past, tia, pvd and anxiety  Areas of need identified by patient include primary care provider closer to home and help with managing her diabetes. .   Complex care plan created with patient input. By next encounter, patient agrees to attend appointment with new pcp on Friday 2/11.   Pt states her grandson lives with her and will assist as needed.  She does not drive and her sister aurelio will bring her to md appointments.   test blood sugars 1   times per day.     bs range- 160 - 260  a1c- 13.8  a1c has gradually increased over past 3 ears  Was in the 9 range    Medication list reviewed  No problems noted      [facility] pharmacy for diabetic suppiles or medications with no copay and OTC quartely allowance  Pt is aware of [facility] mail order pharmacy and is utilizing presently.  She was not aware of the [employer] meal program and consented to receipt of those meals.  She is aware of the otc allowance but has not utilized in past.  Pt inidicates she has not been managing her dm correctly.  [place] and only brought a few days of insulin with her the rest was left home and she threw away due to elec out.  She could not afford to buy replacement so was only taking oral to  manage.  She has received her insulin and has started.  [facility] and she states she tried in past and had a bad reaction to insulin  Pt has not seen her pcp since October 2021.  [place]  She is requesting to get a pcp closer to her home possibly out of the [facility] clinic..  Called and spoke with cass at dr hodges office and advised of pt request.      Call to [facility] with pt on the phone and appt made with dr. Paris.    Pt requesting new diabetic meter  Pt needs teeth pulled but on blood thinner  Has not been able to coordinate with dentist and cardiology for this.  Possible send message to cardiologist about putting blood thinner on hold to get dental work        Intervetnions  · Nursing screen, assessment and medication review completed today telephonically.  · Review of problem list and medical history   · Advised the after visit summary has most recent list of medications.   Please review for accuracy and notify PCP of any discrepancies.  Keep a copy after each md visit for your records  · Plan of care and  Short and long term goals developed with patient and verbalized understanding and agreement to these goals  · Future appointment schedule reviewed in epic and discussed with the patient   · Reviewed [employer] otc and mail order pharmacy benefit and [employer] meals benefit  · Welcome statement and contact information as well as beth information sent via pt portal/mail.  · Admit notifications sent to pcp  · Completed forms section with review of diagnosis        Next steps  Is pt vaccinated for covid  Best number to contact pt [phone]  don't call before 12 noon  Did pt do what they agreed on at admit: attend new pcp appointment  Fu on possible endocrinology appt what about digital dm program  Follow up on receipt of education material dm management guide and living will information sent in mail  Send notification to pcp about opcm  after she is established with him  Fu on receipt of otc catalog and assist pt in placing order  Cp on dm management  Message to cardiology and pcp about pt need for dental work          Assessment Documentation     OPCM Initial Assessment    Involvement of Care  Identified Areas of Need  *Active medication list was reviewed and reconciled with patient and/or caregiver:           Problem List and History         Reviewed medical and social history with patient and/or caregiver. A complex care plan was discussed and completed today, with input from patient and/or caregiver.    Patient Instructions     Instructions were provided via the [facility] patient resources and are available for the patient to view on the patient portal, if active.    Todays OPCM Self-Management Care Plan was developed with the patients/caregivers input and was based on identified barriers from todays assessment.  Goals were written today with the patient/caregiver and the patient has agreed to work towards these goals to improve his/her overall well-being. Patient verbalized understanding of the care plan, goals, and all of today's instructions. Encouraged patient/caregiver to communicate with his/her physician and health care team about health conditions and the treatment plan.  Provided my contact information today and encouraged patient/caregiver to call me with any questions as needed.

## 2022-02-11 ENCOUNTER — OFFICE VISIT (OUTPATIENT)
Dept: FAMILY MEDICINE | Facility: CLINIC | Age: 72
End: 2022-02-11
Payer: MEDICARE

## 2022-02-11 ENCOUNTER — LAB VISIT (OUTPATIENT)
Dept: LAB | Facility: HOSPITAL | Age: 72
End: 2022-02-11
Attending: INTERNAL MEDICINE
Payer: MEDICARE

## 2022-02-11 VITALS
OXYGEN SATURATION: 97 % | SYSTOLIC BLOOD PRESSURE: 138 MMHG | WEIGHT: 177.38 LBS | HEART RATE: 83 BPM | HEIGHT: 69 IN | DIASTOLIC BLOOD PRESSURE: 80 MMHG | TEMPERATURE: 99 F | BODY MASS INDEX: 26.27 KG/M2

## 2022-02-11 DIAGNOSIS — C82.90 FOLLICULAR LYMPHOMA, UNSPECIFIED FOLLICULAR LYMPHOMA TYPE, UNSPECIFIED BODY REGION: ICD-10-CM

## 2022-02-11 DIAGNOSIS — I70.0 AORTIC ATHEROSCLEROSIS: ICD-10-CM

## 2022-02-11 DIAGNOSIS — I27.20 PULMONARY HYPERTENSION: ICD-10-CM

## 2022-02-11 DIAGNOSIS — T82.898A OCCLUSION OF PERIPHERALLY INSERTED CENTRAL CATHETER (PICC) LINE, INITIAL ENCOUNTER: ICD-10-CM

## 2022-02-11 DIAGNOSIS — I25.118 CORONARY ARTERY DISEASE OF NATIVE ARTERY OF NATIVE HEART WITH STABLE ANGINA PECTORIS: ICD-10-CM

## 2022-02-11 DIAGNOSIS — F33.1 MODERATE EPISODE OF RECURRENT MAJOR DEPRESSIVE DISORDER: ICD-10-CM

## 2022-02-11 DIAGNOSIS — E11.22 CKD STAGE 3 SECONDARY TO DIABETES: ICD-10-CM

## 2022-02-11 DIAGNOSIS — E11.59 HYPERTENSION ASSOCIATED WITH DIABETES: ICD-10-CM

## 2022-02-11 DIAGNOSIS — E11.3592 PROLIFERATIVE DIABETIC RETINOPATHY OF LEFT EYE ASSOCIATED WITH TYPE 2 DIABETES MELLITUS, UNSPECIFIED PROLIFERATIVE RETINOPATHY TYPE: ICD-10-CM

## 2022-02-11 DIAGNOSIS — I15.2 HYPERTENSION ASSOCIATED WITH DIABETES: ICD-10-CM

## 2022-02-11 DIAGNOSIS — N18.30 CKD STAGE 3 SECONDARY TO DIABETES: ICD-10-CM

## 2022-02-11 LAB
ANION GAP SERPL CALC-SCNC: 12 MMOL/L (ref 8–16)
BUN SERPL-MCNC: 20 MG/DL (ref 8–23)
CALCIUM SERPL-MCNC: 9.8 MG/DL (ref 8.7–10.5)
CHLORIDE SERPL-SCNC: 103 MMOL/L (ref 95–110)
CO2 SERPL-SCNC: 23 MMOL/L (ref 23–29)
CREAT SERPL-MCNC: 1 MG/DL (ref 0.5–1.4)
EST. GFR  (AFRICAN AMERICAN): >60 ML/MIN/1.73 M^2
EST. GFR  (NON AFRICAN AMERICAN): 56.8 ML/MIN/1.73 M^2
GLUCOSE SERPL-MCNC: 216 MG/DL (ref 70–110)
POTASSIUM SERPL-SCNC: 4.7 MMOL/L (ref 3.5–5.1)
SODIUM SERPL-SCNC: 138 MMOL/L (ref 136–145)

## 2022-02-11 PROCEDURE — 3079F PR MOST RECENT DIASTOLIC BLOOD PRESSURE 80-89 MM HG: ICD-10-PCS | Mod: HCNC,CPTII,S$GLB, | Performed by: INTERNAL MEDICINE

## 2022-02-11 PROCEDURE — 99499 RISK ADDL DX/OHS AUDIT: ICD-10-PCS | Mod: S$GLB,,, | Performed by: INTERNAL MEDICINE

## 2022-02-11 PROCEDURE — 3288F PR FALLS RISK ASSESSMENT DOCUMENTED: ICD-10-PCS | Mod: HCNC,CPTII,S$GLB, | Performed by: INTERNAL MEDICINE

## 2022-02-11 PROCEDURE — 3066F NEPHROPATHY DOC TX: CPT | Mod: HCNC,CPTII,S$GLB, | Performed by: INTERNAL MEDICINE

## 2022-02-11 PROCEDURE — 1159F PR MEDICATION LIST DOCUMENTED IN MEDICAL RECORD: ICD-10-PCS | Mod: HCNC,CPTII,S$GLB, | Performed by: INTERNAL MEDICINE

## 2022-02-11 PROCEDURE — 3288F FALL RISK ASSESSMENT DOCD: CPT | Mod: HCNC,CPTII,S$GLB, | Performed by: INTERNAL MEDICINE

## 2022-02-11 PROCEDURE — 3046F PR MOST RECENT HEMOGLOBIN A1C LEVEL > 9.0%: ICD-10-PCS | Mod: HCNC,CPTII,S$GLB, | Performed by: INTERNAL MEDICINE

## 2022-02-11 PROCEDURE — 80048 BASIC METABOLIC PNL TOTAL CA: CPT | Mod: HCNC | Performed by: INTERNAL MEDICINE

## 2022-02-11 PROCEDURE — 3075F SYST BP GE 130 - 139MM HG: CPT | Mod: HCNC,CPTII,S$GLB, | Performed by: INTERNAL MEDICINE

## 2022-02-11 PROCEDURE — 1101F PT FALLS ASSESS-DOCD LE1/YR: CPT | Mod: HCNC,CPTII,S$GLB, | Performed by: INTERNAL MEDICINE

## 2022-02-11 PROCEDURE — 3075F PR MOST RECENT SYSTOLIC BLOOD PRESS GE 130-139MM HG: ICD-10-PCS | Mod: HCNC,CPTII,S$GLB, | Performed by: INTERNAL MEDICINE

## 2022-02-11 PROCEDURE — 3066F PR DOCUMENTATION OF TREATMENT FOR NEPHROPATHY: ICD-10-PCS | Mod: HCNC,CPTII,S$GLB, | Performed by: INTERNAL MEDICINE

## 2022-02-11 PROCEDURE — 1101F PR PT FALLS ASSESS DOC 0-1 FALLS W/OUT INJ PAST YR: ICD-10-PCS | Mod: HCNC,CPTII,S$GLB, | Performed by: INTERNAL MEDICINE

## 2022-02-11 PROCEDURE — 99214 PR OFFICE/OUTPT VISIT, EST, LEVL IV, 30-39 MIN: ICD-10-PCS | Mod: HCNC,S$GLB,, | Performed by: INTERNAL MEDICINE

## 2022-02-11 PROCEDURE — 3062F PR POS MACROALBUMINURIA RESULT DOCUMENTED/REVIEW: ICD-10-PCS | Mod: HCNC,CPTII,S$GLB, | Performed by: INTERNAL MEDICINE

## 2022-02-11 PROCEDURE — 3008F BODY MASS INDEX DOCD: CPT | Mod: HCNC,CPTII,S$GLB, | Performed by: INTERNAL MEDICINE

## 2022-02-11 PROCEDURE — 1159F MED LIST DOCD IN RCRD: CPT | Mod: HCNC,CPTII,S$GLB, | Performed by: INTERNAL MEDICINE

## 2022-02-11 PROCEDURE — 3008F PR BODY MASS INDEX (BMI) DOCUMENTED: ICD-10-PCS | Mod: HCNC,CPTII,S$GLB, | Performed by: INTERNAL MEDICINE

## 2022-02-11 PROCEDURE — 1126F PR PAIN SEVERITY QUANTIFIED, NO PAIN PRESENT: ICD-10-PCS | Mod: HCNC,CPTII,S$GLB, | Performed by: INTERNAL MEDICINE

## 2022-02-11 PROCEDURE — 99214 OFFICE O/P EST MOD 30 MIN: CPT | Mod: HCNC,S$GLB,, | Performed by: INTERNAL MEDICINE

## 2022-02-11 PROCEDURE — 3046F HEMOGLOBIN A1C LEVEL >9.0%: CPT | Mod: HCNC,CPTII,S$GLB, | Performed by: INTERNAL MEDICINE

## 2022-02-11 PROCEDURE — 1126F AMNT PAIN NOTED NONE PRSNT: CPT | Mod: HCNC,CPTII,S$GLB, | Performed by: INTERNAL MEDICINE

## 2022-02-11 PROCEDURE — 83036 HEMOGLOBIN GLYCOSYLATED A1C: CPT | Mod: HCNC | Performed by: INTERNAL MEDICINE

## 2022-02-11 PROCEDURE — 99499 UNLISTED E&M SERVICE: CPT | Mod: S$GLB,,, | Performed by: INTERNAL MEDICINE

## 2022-02-11 PROCEDURE — 36415 COLL VENOUS BLD VENIPUNCTURE: CPT | Mod: HCNC,PO | Performed by: INTERNAL MEDICINE

## 2022-02-11 PROCEDURE — 3062F POS MACROALBUMINURIA REV: CPT | Mod: HCNC,CPTII,S$GLB, | Performed by: INTERNAL MEDICINE

## 2022-02-11 PROCEDURE — 99999 PR PBB SHADOW E&M-EST. PATIENT-LVL III: ICD-10-PCS | Mod: PBBFAC,HCNC,, | Performed by: INTERNAL MEDICINE

## 2022-02-11 PROCEDURE — 3079F DIAST BP 80-89 MM HG: CPT | Mod: HCNC,CPTII,S$GLB, | Performed by: INTERNAL MEDICINE

## 2022-02-11 PROCEDURE — 99999 PR PBB SHADOW E&M-EST. PATIENT-LVL III: CPT | Mod: PBBFAC,HCNC,, | Performed by: INTERNAL MEDICINE

## 2022-02-11 NOTE — PROGRESS NOTES
"Subjective:        Chief Complaint  Chief Complaint   Patient presents with    Establish Care       HPI  Lorena Contreras is a 71 y.o. female with multiple medical diagnoses as listed in the medical history and problem list that presents for above complaint(s).  Patient is new to me.    History of T2DM   Patient with BG today of 220  Last visit with Dr Kulkarni did have hyperglycemia  Does get hypoglycemia if she takes certain medications at nighttime  Insulin is covered under her [employer] plan    The following sections were updated/reviewed and documented in the electronic medical record: Past Medical History, Past Surgical History, Social History, Family History, Allergies and Medications    Review of Systems   Constitutional: Negative for appetite change, chills, fever and unexpected weight change.   Respiratory: Negative for cough and shortness of breath.    Cardiovascular: Negative for chest pain, palpitations and leg swelling.   Gastrointestinal: Negative for abdominal pain, constipation, diarrhea, nausea and vomiting.   Musculoskeletal: Negative.    Skin: Negative.    Neurological: Negative for dizziness, light-headedness and headaches.   Psychiatric/Behavioral: Negative for dysphoric mood. The patient is not nervous/anxious.          Objective:     Physical Exam  Vitals:    02/11/22 1004   BP: 138/80   BP Location: Left arm   Patient Position: Sitting   BP Method: Large (Manual)   Pulse: 83   Temp: 99.2 °F (37.3 °C)   TempSrc: Oral   SpO2: 97%   Weight: 80.4 kg (177 lb 5.8 oz)   Height: 5' 9" (1.753 m)    Body mass index is 26.19 kg/m².  Weight: 80.4 kg (177 lb 5.8 oz)   Height: 5' 9" (175.3 cm)   Physical Exam  Vitals reviewed.   Constitutional:       General: She is not in acute distress.     Appearance: She is well-developed.   HENT:      Head: Normocephalic and atraumatic.      Mouth/Throat:      Mouth: Oropharynx is clear and moist.   Eyes:      Extraocular Movements: EOM normal.      Conjunctiva/sclera: " Conjunctivae normal.      Pupils: Pupils are equal, round, and reactive to light.   Neck:      Thyroid: No thyromegaly.   Cardiovascular:      Rate and Rhythm: Normal rate.      Pulses: Intact distal pulses.      Heart sounds: Normal heart sounds. No murmur heard.      Pulmonary:      Effort: Pulmonary effort is normal.      Breath sounds: Normal breath sounds.   Musculoskeletal:         General: No deformity or edema.      Cervical back: Normal range of motion and neck supple.   Lymphadenopathy:      Cervical: No cervical adenopathy.   Skin:     General: Skin is warm and dry.   Neurological:      Mental Status: She is alert.      Cranial Nerves: No cranial nerve deficit.   Psychiatric:         Mood and Affect: Mood and affect normal.         Behavior: Behavior normal.         Assessment:     1. Uncontrolled type 2 diabetes mellitus with diabetic polyneuropathy, with long-term current use of insulin    2. Proliferative diabetic retinopathy of left eye associated with type 2 diabetes mellitus, unspecified proliferative retinopathy type    3. Occlusion of peripherally inserted central catheter (PICC) line, initial encounter    4. Follicular lymphoma, unspecified follicular lymphoma type, unspecified body region    5. Hypertension associated with diabetes    6. Moderate episode of recurrent major depressive disorder    7. Pulmonary hypertension    8. Coronary artery disease of native artery of native heart with stable angina pectoris    9. Aortic atherosclerosis    10. CKD stage 3 secondary to diabetes      Plan:     Lorena was seen today for establish care.    Diagnoses and all orders for this visit:    Uncontrolled type 2 diabetes mellitus with diabetic polyneuropathy, with long-term current use of insulin  Proliferative diabetic retinopathy of left eye associated with type 2 diabetes mellitus, unspecified proliferative retinopathy type  CKD stage 3 secondary to diabetes  Discussed current insulin pharmacotherapy and  measures to improve current diabetes control  Will consider GLP1a or SGLT2i therapy  -     Microalbumin/Creatinine Ratio, Urine; Future  -     Hemoglobin A1C; Future  -     Basic Metabolic Panel; Future    Follicular lymphoma, unspecified follicular lymphoma type, unspecified body region  Reviewed stable    Hypertension associated with diabetes  BP approximating goal less than 130/80  Continue current medications    Moderate episode of recurrent major depressive disorder  Reviewed current pharmacotherapy and previous treatment for depressive disorder    Pulmonary hypertension  Noted on prior echocardiography    Coronary artery disease of native artery of native heart with stable angina pectoris  Aortic atherosclerosis  Discussed risk factor modification i.e. controlled type 2 diabetes as well as attenuation of statin therapy        Health Maintenance reviewed, addressed as per orders    F/u in 3 months    1. The patient indicates understanding of these issues and agrees with the plan. Brief care plan is updated and reviewed with the patient as applicable.   2. The patient is given an After Visit Summary that lists all medications with directions, allergies, orders placed during this encounter and follow-up instructions.   3. I have reviewed the patient's medical information including past medical, family, and social history sections including the medications and allergies.   4. We discussed the patient's current medications. I reconciled the patient's medication list and prepared and supplied needed refills.       Jorge Paris MD  Internal Medicine-Pediatrics

## 2022-02-12 LAB
ESTIMATED AVG GLUCOSE: 237 MG/DL (ref 68–131)
HBA1C MFR BLD: 9.9 % (ref 4–5.6)

## 2022-02-15 ENCOUNTER — TELEPHONE (OUTPATIENT)
Dept: FAMILY MEDICINE | Facility: CLINIC | Age: 72
End: 2022-02-15
Payer: MEDICAID

## 2022-02-15 RX ORDER — DULAGLUTIDE 1.5 MG/.5ML
1.5 INJECTION, SOLUTION SUBCUTANEOUS
Qty: 4 PEN | Refills: 5 | Status: SHIPPED | OUTPATIENT
Start: 2022-02-15 | End: 2022-03-17

## 2022-02-15 NOTE — TELEPHONE ENCOUNTER
----- Message from Jorge Paris MD sent at 2/15/2022  7:42 AM CST -----  Please call the patient regarding the following results:    A1c is 9.9% and our goal is less than 7.5%  I am sending Trulicity to [facility] for prior authorization/insurance improved will  If medication is approved he will be available for pickup and in the future we can transfer to her local pharmacy  Let me know if you have any questions or concerns

## 2022-02-21 ENCOUNTER — TELEPHONE (OUTPATIENT)
Dept: FAMILY MEDICINE | Facility: CLINIC | Age: 72
End: 2022-02-21
Payer: MEDICAID

## 2022-02-21 ENCOUNTER — OUTPATIENT CASE MANAGEMENT (OUTPATIENT)
Dept: ADMINISTRATIVE | Facility: OTHER | Age: 72
End: 2022-02-21
Payer: MEDICAID

## 2022-02-21 ENCOUNTER — PATIENT MESSAGE (OUTPATIENT)
Dept: ADMINISTRATIVE | Facility: OTHER | Age: 72
End: 2022-02-21
Payer: MEDICAID

## 2022-02-21 NOTE — PROGRESS NOTES
Outpatient Care Management  Plan of Care Follow Up Visit    Patient: Lorena Contreras  MRN: [id number]  Date of Service: 02/21/2022  Completed by: Palma Méndez RN  Referral Date:   Program:     Reason for Visit   Patient presents with    OPCM Chart Review    OPCM RN First Follow-Up Attempt     2/21/22  message sent via pt portal    OPCM RN Second Follow-Up Attempt     2/28    OPCM RN Third Follow-Up Attempt     3/3    Case Closure     3/3 no call back and no messages       Brief Summary:   3/3/  Multiple attempts to contact pt and no response to pt portal message.  Case closed  Pt did attend the pcp appointment made with dr. james  a1c 9.9 sent out trulicity for approval  Some improvement was 11.9 1 yr ago and 13.8 4 months ago  Connected home contact on 2/15       Next steps from previous encounter  Is pt vaccinated for covid  Best number to contact pt [phone]  don't call before 12 noon  Did pt do what they agreed on at admit: attend new pcp appointment  yes  Fu on possible endocrinology appt what about digital dm program  Follow up on receipt of education material dm management guide and living will information sent in mail  Send notification to pcp about opcm after she is established with him done  Fu on receipt of otc catalog and assist pt in placing order  Cp on dm management  Message to cardiology and pcp about pt need for dental work    Reviewed with pt guidelines to prevent acquiring covid 19  Wash hands often (for 20 seconds singing Happy Birthday), cover your cough or sneeze into your elbow or a tissue, stay away from people who are sick, don't touch your eyes, nose or mouth with unwashed hands. Provided Instruction to stay home as directed by local government officials and only make trips that are of essential needs     Interventions  Chart reviewed  Reviewed upcoming appt schedule        Assess/review short term goals met       Patient agrees to follow up call in ...      Next steps  Review  educational information on      Patient Summary     Involvement of Care:  Do I have permission to speak with other family members about your care?       Patient Reported Labs & Vitals:  1.  Any Patient Reported Labs & Vitals?     2.  Patient Reported Blood Pressure:     3.  Patient Reported Pulse:     4.  Patient Reported Weight (Kg):     5.  Patient Reported Blood Glucose (mg/dl):       Medical and social history was reviewed with patient and/or caregiver.     Clinical Assessment     Reviewed and provided basic information on available community resources for mental health, transportation, wellness resources, and palliative care programs with patient and/or caregiver.     Complex Care Plan     Care plan was discussed and completed today with input from patient and/or caregiver.    Patient Instructions     Instructions were provided via the [facility] patient [employer] and are available for the patient to view on the patient portal.      Todays OPCM Self-Management Care Plan was developed with the patients/caregivers input and was based on identified barriers from todays assessment.  Goals were written today with the patient/caregiver and the patient has agreed to work towards these goals to improve his/her overall well-being. Patient verbalized understanding of the care plan, goals, and all of today's instructions. Encouraged patient/caregiver to communicate with his/her physician and health care team about health conditions and the treatment plan.  Provided my contact information today and encouraged patient/caregiver to call me with any questions as needed.

## 2022-02-21 NOTE — TELEPHONE ENCOUNTER
----- Message from Palma Méndez RN sent at 2/21/2022  1:05 PM CST -----  2/21/2022           Your patient [id number] Lorena Contreras  was  referred to [facility] by Provider Referral.      My name is Palma Méndez RN, Care Manager.  At times, it may be necessary to have a  involved in the coordination of care, their names will be added to the Care Team where appropriate.    In our enrollment encounter, the following needs were identified:  Care Coordination and Disease Management Education    All needs and services provided by [facility] Man[facility] and other care team members will be communicated to you via your Resource Pool.      Our documentation can be readily accessed in the EMR using the following tabs: Chart review -> Episodes: High Risk.     It is our goal to provide holistic care, if at any time you feel other areas of concern need to be addressed, please communicate with me by Inbasket messaging.      Palma Méndez RN,[facility]  Outpatient Complex Case Management  [phone]

## 2022-03-21 ENCOUNTER — TELEPHONE (OUTPATIENT)
Dept: CARDIOLOGY | Facility: CLINIC | Age: 72
End: 2022-03-21
Payer: MEDICAID

## 2022-03-21 NOTE — TELEPHONE ENCOUNTER
I informed the pt that Dr. Rico is currently not in the office, but he will get back to her asap. She reports her procedure was supposed to be for 11 AM tomorrow.     ----- Message from Megan Gimenez sent at 3/21/2022  1:58 PM CDT -----  Type: Patient Call Back    Who called: self     What is the request in detail: Patient needs a root canal and needs clearance from Dr. Rico. Please call patient.     Can the clinic reply by [facility]? No     Would the patient rather a call back or a response via [facility]? Call back     Best call back number: [phone]

## 2022-04-18 DIAGNOSIS — R42 DIZZINESS: ICD-10-CM

## 2022-04-18 NOTE — TELEPHONE ENCOUNTER
No new care gaps identified.  Powered by [employer]. Reference number: [id number].   4/18/2022 2:08:20 PM CDT

## 2022-04-18 NOTE — TELEPHONE ENCOUNTER
----- Message from Alexandria Grullon sent at 4/18/2022  1:28 PM CDT -----  Regarding: Pt called to speak to the nurse regarding her care due to experiencing dizziness and pt would also like to know if an Rx can be sent to the pharmacy  Patient Advice:    Pt called to speak to the nurse regarding her care due to experiencing dizziness and pt would also like to know if an Rx can be sent to the pharmacy. If the pt needs to come in for an appt she would like to be seen in the afternoon on Wed, 4/20/22.    Pt can be reached at [phone].

## 2022-04-20 RX ORDER — MECLIZINE HYDROCHLORIDE 25 MG/1
25 TABLET ORAL 3 TIMES DAILY PRN
Qty: 20 TABLET | Refills: 0 | Status: SHIPPED | OUTPATIENT
Start: 2022-04-20 | End: 2022-05-26 | Stop reason: SDUPTHER

## 2022-05-25 DIAGNOSIS — E11.9 TYPE 2 DIABETES MELLITUS WITHOUT COMPLICATION: ICD-10-CM

## 2022-05-26 ENCOUNTER — LAB VISIT (OUTPATIENT)
Dept: LAB | Facility: HOSPITAL | Age: 72
End: 2022-05-26
Attending: INTERNAL MEDICINE
Payer: MEDICARE

## 2022-05-26 ENCOUNTER — TELEPHONE (OUTPATIENT)
Dept: FAMILY MEDICINE | Facility: CLINIC | Age: 72
End: 2022-05-26
Payer: MEDICARE

## 2022-05-26 ENCOUNTER — OFFICE VISIT (OUTPATIENT)
Dept: FAMILY MEDICINE | Facility: CLINIC | Age: 72
End: 2022-05-26
Payer: MEDICARE

## 2022-05-26 VITALS
DIASTOLIC BLOOD PRESSURE: 66 MMHG | HEIGHT: 69 IN | HEART RATE: 67 BPM | TEMPERATURE: 99 F | BODY MASS INDEX: 25.78 KG/M2 | WEIGHT: 174.06 LBS | SYSTOLIC BLOOD PRESSURE: 134 MMHG | OXYGEN SATURATION: 97 %

## 2022-05-26 DIAGNOSIS — D64.9 CHRONIC ANEMIA: Primary | ICD-10-CM

## 2022-05-26 DIAGNOSIS — F41.9 ANXIETY: ICD-10-CM

## 2022-05-26 DIAGNOSIS — D50.9 IRON DEFICIENCY ANEMIA, UNSPECIFIED IRON DEFICIENCY ANEMIA TYPE: ICD-10-CM

## 2022-05-26 DIAGNOSIS — I25.118 CORONARY ARTERY DISEASE OF NATIVE ARTERY OF NATIVE HEART WITH STABLE ANGINA PECTORIS: ICD-10-CM

## 2022-05-26 DIAGNOSIS — D64.9 CHRONIC ANEMIA: ICD-10-CM

## 2022-05-26 DIAGNOSIS — R42 DIZZINESS: ICD-10-CM

## 2022-05-26 DIAGNOSIS — Z79.4 UNCONTROLLED TYPE 2 DIABETES MELLITUS WITH HYPERGLYCEMIA, WITH LONG-TERM CURRENT USE OF INSULIN: ICD-10-CM

## 2022-05-26 DIAGNOSIS — R20.8 OTHER DISTURBANCES OF SKIN SENSATION: ICD-10-CM

## 2022-05-26 DIAGNOSIS — G62.9 POLYNEUROPATHY: ICD-10-CM

## 2022-05-26 DIAGNOSIS — E11.65 UNCONTROLLED TYPE 2 DIABETES MELLITUS WITH HYPERGLYCEMIA, WITH LONG-TERM CURRENT USE OF INSULIN: ICD-10-CM

## 2022-05-26 DIAGNOSIS — E11.59 HYPERTENSION ASSOCIATED WITH DIABETES: ICD-10-CM

## 2022-05-26 DIAGNOSIS — K21.9 GASTROESOPHAGEAL REFLUX DISEASE, UNSPECIFIED WHETHER ESOPHAGITIS PRESENT: ICD-10-CM

## 2022-05-26 DIAGNOSIS — I15.2 HYPERTENSION ASSOCIATED WITH DIABETES: ICD-10-CM

## 2022-05-26 LAB
ANION GAP SERPL CALC-SCNC: 12 MMOL/L (ref 8–16)
BASOPHILS # BLD AUTO: 0.14 K/UL (ref 0–0.2)
BASOPHILS NFR BLD: 1.3 % (ref 0–1.9)
BUN SERPL-MCNC: 32 MG/DL (ref 8–23)
CALCIUM SERPL-MCNC: 10 MG/DL (ref 8.7–10.5)
CHLORIDE SERPL-SCNC: 99 MMOL/L (ref 95–110)
CO2 SERPL-SCNC: 22 MMOL/L (ref 23–29)
CREAT SERPL-MCNC: 1.4 MG/DL (ref 0.5–1.4)
DIFFERENTIAL METHOD: ABNORMAL
EOSINOPHIL # BLD AUTO: 0.3 K/UL (ref 0–0.5)
EOSINOPHIL NFR BLD: 2.6 % (ref 0–8)
ERYTHROCYTE [DISTWIDTH] IN BLOOD BY AUTOMATED COUNT: 14.5 % (ref 11.5–14.5)
EST. GFR  (AFRICAN AMERICAN): 43.6 ML/MIN/1.73 M^2
EST. GFR  (NON AFRICAN AMERICAN): 37.8 ML/MIN/1.73 M^2
ESTIMATED AVG GLUCOSE: ABNORMAL MG/DL (ref 68–131)
FERRITIN SERPL-MCNC: 63 NG/ML (ref 20–300)
GLUCOSE SERPL-MCNC: 568 MG/DL (ref 70–110)
HBA1C MFR BLD: >14 % (ref 4–5.6)
HCT VFR BLD AUTO: 39.3 % (ref 37–48.5)
HGB BLD-MCNC: 12.7 G/DL (ref 12–16)
IMM GRANULOCYTES # BLD AUTO: 0.18 K/UL (ref 0–0.04)
IMM GRANULOCYTES NFR BLD AUTO: 1.7 % (ref 0–0.5)
IRON SERPL-MCNC: 97 UG/DL (ref 30–160)
LYMPHOCYTES # BLD AUTO: 1.9 K/UL (ref 1–4.8)
LYMPHOCYTES NFR BLD: 17.9 % (ref 18–48)
MCH RBC QN AUTO: 26.3 PG (ref 27–31)
MCHC RBC AUTO-ENTMCNC: 32.3 G/DL (ref 32–36)
MCV RBC AUTO: 81 FL (ref 82–98)
MONOCYTES # BLD AUTO: 0.5 K/UL (ref 0.3–1)
MONOCYTES NFR BLD: 5 % (ref 4–15)
NEUTROPHILS # BLD AUTO: 7.7 K/UL (ref 1.8–7.7)
NEUTROPHILS NFR BLD: 71.5 % (ref 38–73)
NRBC BLD-RTO: 0 /100 WBC
PLATELET # BLD AUTO: 237 K/UL (ref 150–450)
PMV BLD AUTO: 13.7 FL (ref 9.2–12.9)
POTASSIUM SERPL-SCNC: 4.8 MMOL/L (ref 3.5–5.1)
RBC # BLD AUTO: 4.83 M/UL (ref 4–5.4)
SATURATED IRON: 22 % (ref 20–50)
SODIUM SERPL-SCNC: 133 MMOL/L (ref 136–145)
TOTAL IRON BINDING CAPACITY: 448 UG/DL (ref 250–450)
TRANSFERRIN SERPL-MCNC: 303 MG/DL (ref 200–375)
VIT B12 SERPL-MCNC: 1161 PG/ML (ref 210–950)
WBC # BLD AUTO: 10.74 K/UL (ref 3.9–12.7)

## 2022-05-26 PROCEDURE — 3075F SYST BP GE 130 - 139MM HG: CPT | Mod: CPTII,S$GLB,, | Performed by: INTERNAL MEDICINE

## 2022-05-26 PROCEDURE — 3062F PR POS MACROALBUMINURIA RESULT DOCUMENTED/REVIEW: ICD-10-PCS | Mod: CPTII,S$GLB,, | Performed by: INTERNAL MEDICINE

## 2022-05-26 PROCEDURE — 36415 COLL VENOUS BLD VENIPUNCTURE: CPT | Mod: PO | Performed by: INTERNAL MEDICINE

## 2022-05-26 PROCEDURE — 84466 ASSAY OF TRANSFERRIN: CPT | Performed by: INTERNAL MEDICINE

## 2022-05-26 PROCEDURE — 3078F PR MOST RECENT DIASTOLIC BLOOD PRESSURE < 80 MM HG: ICD-10-PCS | Mod: CPTII,S$GLB,, | Performed by: INTERNAL MEDICINE

## 2022-05-26 PROCEDURE — 3062F POS MACROALBUMINURIA REV: CPT | Mod: CPTII,S$GLB,, | Performed by: INTERNAL MEDICINE

## 2022-05-26 PROCEDURE — 1159F MED LIST DOCD IN RCRD: CPT | Mod: CPTII,S$GLB,, | Performed by: INTERNAL MEDICINE

## 2022-05-26 PROCEDURE — 85025 COMPLETE CBC W/AUTO DIFF WBC: CPT | Performed by: INTERNAL MEDICINE

## 2022-05-26 PROCEDURE — 1159F PR MEDICATION LIST DOCUMENTED IN MEDICAL RECORD: ICD-10-PCS | Mod: CPTII,S$GLB,, | Performed by: INTERNAL MEDICINE

## 2022-05-26 PROCEDURE — 80048 BASIC METABOLIC PNL TOTAL CA: CPT | Performed by: INTERNAL MEDICINE

## 2022-05-26 PROCEDURE — 99999 PR PBB SHADOW E&M-EST. PATIENT-LVL IV: ICD-10-PCS | Mod: PBBFAC,,, | Performed by: INTERNAL MEDICINE

## 2022-05-26 PROCEDURE — 83036 HEMOGLOBIN GLYCOSYLATED A1C: CPT | Performed by: INTERNAL MEDICINE

## 2022-05-26 PROCEDURE — 3066F NEPHROPATHY DOC TX: CPT | Mod: CPTII,S$GLB,, | Performed by: INTERNAL MEDICINE

## 2022-05-26 PROCEDURE — 82607 VITAMIN B-12: CPT | Performed by: INTERNAL MEDICINE

## 2022-05-26 PROCEDURE — 1125F AMNT PAIN NOTED PAIN PRSNT: CPT | Mod: CPTII,S$GLB,, | Performed by: INTERNAL MEDICINE

## 2022-05-26 PROCEDURE — 3008F BODY MASS INDEX DOCD: CPT | Mod: CPTII,S$GLB,, | Performed by: INTERNAL MEDICINE

## 2022-05-26 PROCEDURE — 3008F PR BODY MASS INDEX (BMI) DOCUMENTED: ICD-10-PCS | Mod: CPTII,S$GLB,, | Performed by: INTERNAL MEDICINE

## 2022-05-26 PROCEDURE — 3046F PR MOST RECENT HEMOGLOBIN A1C LEVEL > 9.0%: ICD-10-PCS | Mod: CPTII,S$GLB,, | Performed by: INTERNAL MEDICINE

## 2022-05-26 PROCEDURE — 99999 PR PBB SHADOW E&M-EST. PATIENT-LVL IV: CPT | Mod: PBBFAC,,, | Performed by: INTERNAL MEDICINE

## 2022-05-26 PROCEDURE — 82728 ASSAY OF FERRITIN: CPT | Performed by: INTERNAL MEDICINE

## 2022-05-26 PROCEDURE — 3066F PR DOCUMENTATION OF TREATMENT FOR NEPHROPATHY: ICD-10-PCS | Mod: CPTII,S$GLB,, | Performed by: INTERNAL MEDICINE

## 2022-05-26 PROCEDURE — 99215 PR OFFICE/OUTPT VISIT, EST, LEVL V, 40-54 MIN: ICD-10-PCS | Mod: S$GLB,,, | Performed by: INTERNAL MEDICINE

## 2022-05-26 PROCEDURE — 1125F PR PAIN SEVERITY QUANTIFIED, PAIN PRESENT: ICD-10-PCS | Mod: CPTII,S$GLB,, | Performed by: INTERNAL MEDICINE

## 2022-05-26 PROCEDURE — 3075F PR MOST RECENT SYSTOLIC BLOOD PRESS GE 130-139MM HG: ICD-10-PCS | Mod: CPTII,S$GLB,, | Performed by: INTERNAL MEDICINE

## 2022-05-26 PROCEDURE — 3078F DIAST BP <80 MM HG: CPT | Mod: CPTII,S$GLB,, | Performed by: INTERNAL MEDICINE

## 2022-05-26 PROCEDURE — 99215 OFFICE O/P EST HI 40 MIN: CPT | Mod: S$GLB,,, | Performed by: INTERNAL MEDICINE

## 2022-05-26 PROCEDURE — 3046F HEMOGLOBIN A1C LEVEL >9.0%: CPT | Mod: CPTII,S$GLB,, | Performed by: INTERNAL MEDICINE

## 2022-05-26 RX ORDER — METFORMIN HYDROCHLORIDE 1000 MG/1
1000 TABLET ORAL 2 TIMES DAILY WITH MEALS
Qty: 180 TABLET | Refills: 3 | Status: ON HOLD | OUTPATIENT
Start: 2022-05-26 | End: 2023-04-11 | Stop reason: HOSPADM

## 2022-05-26 RX ORDER — PANTOPRAZOLE SODIUM 40 MG/1
40 TABLET, DELAYED RELEASE ORAL DAILY
Qty: 90 TABLET | Refills: 1 | Status: SHIPPED | OUTPATIENT
Start: 2022-05-26 | End: 2022-11-23

## 2022-05-26 RX ORDER — HYDROCHLOROTHIAZIDE 25 MG/1
25 TABLET ORAL DAILY
Qty: 90 TABLET | Refills: 3 | Status: ON HOLD | OUTPATIENT
Start: 2022-05-26 | End: 2023-04-11 | Stop reason: HOSPADM

## 2022-05-26 RX ORDER — MECLIZINE HYDROCHLORIDE 25 MG/1
25 TABLET ORAL 3 TIMES DAILY PRN
Qty: 20 TABLET | Refills: 0 | Status: SHIPPED | OUTPATIENT
Start: 2022-05-26 | End: 2023-06-22

## 2022-05-26 RX ORDER — ISOSORBIDE MONONITRATE 30 MG/1
30 TABLET, EXTENDED RELEASE ORAL DAILY
Qty: 90 TABLET | Refills: 3 | Status: SHIPPED | OUTPATIENT
Start: 2022-05-26 | End: 2023-06-21

## 2022-05-26 RX ORDER — ATENOLOL 50 MG/1
50 TABLET ORAL 2 TIMES DAILY
Qty: 180 TABLET | Refills: 3 | Status: SHIPPED | OUTPATIENT
Start: 2022-05-26 | End: 2023-02-15 | Stop reason: SDUPTHER

## 2022-05-26 RX ORDER — GABAPENTIN 300 MG/1
600 CAPSULE ORAL 2 TIMES DAILY
Qty: 180 CAPSULE | Refills: 1 | Status: SHIPPED | OUTPATIENT
Start: 2022-05-26 | End: 2022-10-24

## 2022-05-26 RX ORDER — NITROGLYCERIN 0.4 MG/1
TABLET SUBLINGUAL
Qty: 25 TABLET | Refills: 11 | Status: SHIPPED | OUTPATIENT
Start: 2022-05-26 | End: 2023-06-22

## 2022-05-26 RX ORDER — FLUOXETINE HYDROCHLORIDE 40 MG/1
40 CAPSULE ORAL DAILY
Qty: 90 CAPSULE | Refills: 3 | Status: SHIPPED | OUTPATIENT
Start: 2022-05-26 | End: 2023-06-21

## 2022-05-26 RX ORDER — ATORVASTATIN CALCIUM 80 MG/1
80 TABLET, FILM COATED ORAL NIGHTLY
Qty: 90 TABLET | Refills: 3 | Status: SHIPPED | OUTPATIENT
Start: 2022-05-26 | End: 2024-01-29

## 2022-05-26 NOTE — PROGRESS NOTES
"Subjective:        Chief Complaint  Chief Complaint   Patient presents with    Diabetes     Follow up    Dizziness     daily       HPI  Lorena Contreras is a 71 y.o. female with multiple medical diagnoses as listed in the medical history and problem list that presents for above complaint(s).       DM  Took insulin prior to shopping recently and had an episode of hypoglycemia   She does note some elevated BGs -  Up to 500-600 atimes   Does have sweating, some tremors, feeling 'numb from head to toe'     The following sections were updated/reviewed and documented in the electronic medical record: Past Medical History, Past Surgical History, Social History, Family History, Allergies and Medications    Review of Systems   Constitutional: Negative for appetite change, chills, fever and unexpected weight change.   Respiratory: Negative for cough and shortness of breath.    Cardiovascular: Negative for chest pain, palpitations and leg swelling.   Gastrointestinal: Negative for abdominal pain, constipation, diarrhea, nausea and vomiting.   Musculoskeletal: Negative.    Skin: Positive for rash.   Neurological: Positive for dizziness. Negative for light-headedness and headaches.   Hematological: Bruises/bleeds easily.   Psychiatric/Behavioral: Negative for dysphoric mood. The patient is not nervous/anxious.          Objective:     Physical Exam  Vitals:    05/26/22 0944   BP: 134/66   Pulse: 67   Temp: 98.7 °F (37.1 °C)   TempSrc: Oral   SpO2: 97%   Weight: 78.9 kg (174 lb 0.9 oz)   Height: 5' 9" (1.753 m)    Body mass index is 25.7 kg/m².  Weight: 78.9 kg (174 lb 0.9 oz)   Height: 5' 9" (175.3 cm)   Physical Exam  Vitals reviewed.   Constitutional:       General: She is not in acute distress.     Appearance: She is well-developed.   HENT:      Head: Normocephalic and atraumatic.   Eyes:      Conjunctiva/sclera: Conjunctivae normal.      Pupils: Pupils are equal, round, and reactive to light.   Neck:      Thyroid: No " thyromegaly.   Cardiovascular:      Rate and Rhythm: Normal rate.      Heart sounds: Murmur (I/VI systolic) heard.   Pulmonary:      Effort: Pulmonary effort is normal.      Breath sounds: Normal breath sounds.   Musculoskeletal:         General: No deformity.      Cervical back: Normal range of motion and neck supple.   Lymphadenopathy:      Cervical: No cervical adenopathy.   Skin:     General: Skin is warm and dry.   Neurological:      Mental Status: She is alert.      Cranial Nerves: No cranial nerve deficit.   Psychiatric:         Behavior: Behavior normal.         Assessment:     1. Chronic anemia    2. Anxiety    3. Uncontrolled type 2 diabetes mellitus with hyperglycemia, with long-term current use of insulin    4. Coronary artery disease of native artery of native heart with stable angina pectoris    5. Iron deficiency anemia, unspecified iron deficiency anemia type    6. Hypertension associated with diabetes    7. Gastroesophageal reflux disease, unspecified whether esophagitis present    8. Other disturbances of skin sensation     9. Dizziness    10. Polyneuropathy      Plan:     Lorena was seen today for establish care.    Diagnoses and all orders for this visit:    Uncontrolled type 2 diabetes mellitus with diabetic polyneuropathy, with long-term current use of insulin  Proliferative diabetic retinopathy of left eye associated with type 2 diabetes mellitus, unspecified proliferative retinopathy type  CKD stage 3 secondary to diabetes  Patient currently on Novolog 70/30 mix 30 units BID prior to meals  Discussed current insulin pharmacotherapy and measures to improve current diabetes control - may consider switch to basal-bolus insulin regimen  Discontinue Januvia given frequent hypoglycemia noted   Will consider GLP1a or SGLT2i therapy if A1c still >8%  -     Hemoglobin A1C; Future      Chronic anemia  Due to iron deficiency likely - on ferrous sulfate orally  Repeat CBC/iron indices         Follicular  lymphoma, unspecified follicular lymphoma type, unspecified body region  Reviewed stable    Hypertension associated with diabetes  BP goal less than 130/80 - slightly above goal today  Continue current medications      Pulmonary hypertension  Noted on prior echocardiography    Depression  Mood stable  Refilled fluoxetine    Coronary artery disease of native artery of native heart with stable angina pectoris  Aortic atherosclerosis  Discussed risk factor modification i.e. controlled type 2 diabetes as well as attenuation of statin therapy  Continue ASA  Continue ticagrelor  Continue f/u with Cardiology       Health Maintenance reviewed, addressed as per orders    F/u in 3 months    I spent 40 minutes reviewing the patient's chart, talking to family/caregiver(s), coordinating care with other providers/specialists and time spent on documentation       1. The patient indicates understanding of these issues and agrees with the plan. Brief care plan is updated and reviewed with the patient as applicable.   2. The patient is given an After Visit Summary that lists all medications with directions, allergies, orders placed during this encounter and follow-up instructions.   3. I have reviewed the patient's medical information including past medical, family, and social history sections including the medications and allergies.   4. We discussed the patient's current medications. I reconciled the patient's medication list and prepared and supplied needed refills.       Jorge Paris MD  Internal Medicine-Pediatrics

## 2022-05-28 DIAGNOSIS — E11.65 UNCONTROLLED TYPE 2 DIABETES MELLITUS WITH HYPERGLYCEMIA, WITH LONG-TERM CURRENT USE OF INSULIN: Primary | ICD-10-CM

## 2022-05-28 DIAGNOSIS — Z79.4 UNCONTROLLED TYPE 2 DIABETES MELLITUS WITH HYPERGLYCEMIA, WITH LONG-TERM CURRENT USE OF INSULIN: Primary | ICD-10-CM

## 2022-05-30 ENCOUNTER — TELEPHONE (OUTPATIENT)
Dept: FAMILY MEDICINE | Facility: CLINIC | Age: 72
End: 2022-05-30
Payer: MEDICARE

## 2022-05-30 NOTE — TELEPHONE ENCOUNTER
----- Message from Jorge Paris MD sent at 5/28/2022  9:32 AM CDT -----  Please call the patient regarding the following results:    Blood counts and iron levels are normal at this time.    Patient's A1c is >14% severely uncontrolled - patient is at high risk or hospitalization or death from this level of elevated glucose. I am referring her to ENDOCRINOLOGY - Please contact our Referrals Coordinator at [phone] if you have not received a call within 2 business days to check on the status     Patient should be taking her blood sugar readings: Please have patient's sugars checked up to 4 times daily as below:    1) Check daily FASTING morning blood glucose  2) Check BEFORE breakfast, lunch or dinner (once a day)   3) Check 2 hours AFTER breakfast, lunch or dinner (once a day)  4) Check at bedtime    Please have patient write down blood glucose levels in a logbook so that we can accurately adjust insulin - she should contact clinic in 1 week    Let me know if you have any questions or concerns.

## 2022-05-31 ENCOUNTER — TELEPHONE (OUTPATIENT)
Dept: FAMILY MEDICINE | Facility: CLINIC | Age: 72
End: 2022-05-31
Payer: MEDICARE

## 2022-05-31 ENCOUNTER — PATIENT MESSAGE (OUTPATIENT)
Dept: ADMINISTRATIVE | Facility: HOSPITAL | Age: 72
End: 2022-05-31
Payer: MEDICARE

## 2022-06-01 ENCOUNTER — TELEPHONE (OUTPATIENT)
Dept: FAMILY MEDICINE | Facility: CLINIC | Age: 72
End: 2022-06-01
Payer: MEDICARE

## 2022-06-19 ENCOUNTER — HOSPITAL ENCOUNTER (INPATIENT)
Facility: HOSPITAL | Age: 72
LOS: 2 days | Discharge: HOME | DRG: 304 | End: 2022-06-21
Attending: EMERGENCY MEDICINE | Admitting: EMERGENCY MEDICINE
Payer: MEDICARE

## 2022-06-19 DIAGNOSIS — N30.00 ACUTE CYSTITIS WITHOUT HEMATURIA: ICD-10-CM

## 2022-06-19 DIAGNOSIS — R53.1 WEAKNESS: ICD-10-CM

## 2022-06-19 DIAGNOSIS — I16.1 HYPERTENSIVE EMERGENCY: Primary | ICD-10-CM

## 2022-06-19 DIAGNOSIS — E11.00 UNCONTROLLED TYPE 2 DIABETES MELLITUS WITH HYPEROSMOLAR NONKETOTIC HYPERGLYCEMIA: ICD-10-CM

## 2022-06-19 DIAGNOSIS — G93.40 ENCEPHALOPATHY: ICD-10-CM

## 2022-06-19 DIAGNOSIS — R07.9 CHEST PAIN: ICD-10-CM

## 2022-06-19 PROBLEM — K08.409 STATUS POST TOOTH EXTRACTION: Status: ACTIVE | Noted: 2022-06-19

## 2022-06-19 PROBLEM — I67.4 HYPERTENSIVE ENCEPHALOPATHY: Status: ACTIVE | Noted: 2022-06-19

## 2022-06-19 PROBLEM — N18.31 STAGE 3A CHRONIC KIDNEY DISEASE: Status: ACTIVE | Noted: 2019-12-10

## 2022-06-19 PROBLEM — N39.0 URINARY TRACT INFECTION WITHOUT HEMATURIA: Status: ACTIVE | Noted: 2022-06-19

## 2022-06-19 LAB
ALBUMIN SERPL BCP-MCNC: 3.6 G/DL (ref 3.5–5.2)
ALLENS TEST: ABNORMAL
ALP SERPL-CCNC: 146 U/L (ref 55–135)
ALT SERPL W/O P-5'-P-CCNC: 31 U/L (ref 10–44)
AMPHET+METHAMPHET UR QL: NEGATIVE
ANION GAP SERPL CALC-SCNC: 11 MMOL/L (ref 8–16)
ANION GAP SERPL CALC-SCNC: 15 MMOL/L (ref 8–16)
APAP SERPL-MCNC: <3 UG/ML (ref 10–20)
AST SERPL-CCNC: 26 U/L (ref 10–40)
B-OH-BUTYR BLD STRIP-SCNC: 1.3 MMOL/L (ref 0–0.5)
BACTERIA #/AREA URNS HPF: ABNORMAL /HPF
BARBITURATES UR QL SCN>200 NG/ML: NEGATIVE
BASOPHILS # BLD AUTO: 0.11 K/UL (ref 0–0.2)
BASOPHILS NFR BLD: 1 % (ref 0–1.9)
BENZODIAZ UR QL SCN>200 NG/ML: NEGATIVE
BILIRUB SERPL-MCNC: 0.5 MG/DL (ref 0.1–1)
BILIRUB UR QL STRIP: NEGATIVE
BNP SERPL-MCNC: 226 PG/ML (ref 0–99)
BUN SERPL-MCNC: 23 MG/DL (ref 8–23)
BUN SERPL-MCNC: 23 MG/DL (ref 8–23)
BZE UR QL SCN: NEGATIVE
CALCIUM SERPL-MCNC: 9.4 MG/DL (ref 8.7–10.5)
CALCIUM SERPL-MCNC: 9.6 MG/DL (ref 8.7–10.5)
CANNABINOIDS UR QL SCN: NEGATIVE
CHLORIDE SERPL-SCNC: 94 MMOL/L (ref 95–110)
CHLORIDE SERPL-SCNC: 95 MMOL/L (ref 95–110)
CLARITY UR: CLEAR
CO2 SERPL-SCNC: 20 MMOL/L (ref 23–29)
CO2 SERPL-SCNC: 22 MMOL/L (ref 23–29)
COLOR UR: YELLOW
CREAT SERPL-MCNC: 1.4 MG/DL (ref 0.5–1.4)
CREAT SERPL-MCNC: 1.4 MG/DL (ref 0.5–1.4)
CREAT UR-MCNC: 17 MG/DL (ref 15–325)
DELSYS: ABNORMAL
DIFFERENTIAL METHOD: NORMAL
EOSINOPHIL # BLD AUTO: 0.1 K/UL (ref 0–0.5)
EOSINOPHIL NFR BLD: 1.2 % (ref 0–8)
ERYTHROCYTE [DISTWIDTH] IN BLOOD BY AUTOMATED COUNT: 14.1 % (ref 11.5–14.5)
EST. GFR  (AFRICAN AMERICAN): 44 ML/MIN/1.73 M^2
EST. GFR  (AFRICAN AMERICAN): 44 ML/MIN/1.73 M^2
EST. GFR  (NON AFRICAN AMERICAN): 38 ML/MIN/1.73 M^2
EST. GFR  (NON AFRICAN AMERICAN): 38 ML/MIN/1.73 M^2
ETHANOL SERPL-MCNC: <10 MG/DL
GLUCOSE SERPL-MCNC: 616 MG/DL (ref 70–110)
GLUCOSE SERPL-MCNC: 656 MG/DL (ref 70–110)
GLUCOSE UR QL STRIP: ABNORMAL
HCO3 UR-SCNC: 25.9 MMOL/L (ref 24–28)
HCT VFR BLD AUTO: 38.5 % (ref 37–48.5)
HGB BLD-MCNC: 12.7 G/DL (ref 12–16)
HGB UR QL STRIP: ABNORMAL
HYALINE CASTS #/AREA URNS LPF: 0 /LPF
IMM GRANULOCYTES # BLD AUTO: 0.04 K/UL (ref 0–0.04)
IMM GRANULOCYTES NFR BLD AUTO: 0.4 % (ref 0–0.5)
INR PPP: 0.9 (ref 0.8–1.2)
KETONES UR QL STRIP: ABNORMAL
LACTATE SERPL-SCNC: 2.6 MMOL/L (ref 0.5–2.2)
LEUKOCYTE ESTERASE UR QL STRIP: ABNORMAL
LYMPHOCYTES # BLD AUTO: 2 K/UL (ref 1–4.8)
LYMPHOCYTES NFR BLD: 19.3 % (ref 18–48)
MAGNESIUM SERPL-MCNC: 1.6 MG/DL (ref 1.6–2.6)
MCH RBC QN AUTO: 28.2 PG (ref 27–31)
MCHC RBC AUTO-ENTMCNC: 33 G/DL (ref 32–36)
MCV RBC AUTO: 85 FL (ref 82–98)
METHADONE UR QL SCN>300 NG/ML: NEGATIVE
MICROSCOPIC COMMENT: ABNORMAL
MODE: ABNORMAL
MONOCYTES # BLD AUTO: 0.6 K/UL (ref 0.3–1)
MONOCYTES NFR BLD: 6 % (ref 4–15)
NEUTROPHILS # BLD AUTO: 7.6 K/UL (ref 1.8–7.7)
NEUTROPHILS NFR BLD: 72.1 % (ref 38–73)
NITRITE UR QL STRIP: POSITIVE
NRBC BLD-RTO: 0 /100 WBC
OPIATES UR QL SCN: NEGATIVE
PCO2 BLDA: 46.4 MMHG (ref 35–45)
PCP UR QL SCN>25 NG/ML: NEGATIVE
PH SMN: 7.36 [PH] (ref 7.35–7.45)
PH UR STRIP: 6 [PH] (ref 5–8)
PLATELET # BLD AUTO: 205 K/UL (ref 150–450)
PMV BLD AUTO: 12.6 FL (ref 9.2–12.9)
PO2 BLDA: 18 MMHG (ref 40–60)
POC BE: 0 MMOL/L
POC SATURATED O2: 24 % (ref 95–100)
POC TCO2: 27 MMOL/L (ref 24–29)
POCT GLUCOSE: 370 MG/DL (ref 70–110)
POCT GLUCOSE: >500 MG/DL (ref 70–110)
POTASSIUM SERPL-SCNC: 4.6 MMOL/L (ref 3.5–5.1)
POTASSIUM SERPL-SCNC: 4.9 MMOL/L (ref 3.5–5.1)
PROT SERPL-MCNC: 7.6 G/DL (ref 6–8.4)
PROT UR QL STRIP: ABNORMAL
PROTHROMBIN TIME: 10 SEC (ref 9–12.5)
RBC # BLD AUTO: 4.51 M/UL (ref 4–5.4)
RBC #/AREA URNS HPF: 4 /HPF (ref 0–4)
SALICYLATES SERPL-MCNC: <5 MG/DL (ref 15–30)
SAMPLE: ABNORMAL
SITE: ABNORMAL
SODIUM SERPL-SCNC: 128 MMOL/L (ref 136–145)
SODIUM SERPL-SCNC: 129 MMOL/L (ref 136–145)
SP GR UR STRIP: 1.02 (ref 1–1.03)
SQUAMOUS #/AREA URNS HPF: 1 /HPF
T4 FREE SERPL-MCNC: 1.3 NG/DL (ref 0.71–1.51)
TOXICOLOGY INFORMATION: NORMAL
TROPONIN I SERPL DL<=0.01 NG/ML-MCNC: 0.02 NG/ML (ref 0–0.03)
TSH SERPL DL<=0.005 MIU/L-ACNC: 0.38 UIU/ML (ref 0.4–4)
URN SPEC COLLECT METH UR: ABNORMAL
UROBILINOGEN UR STRIP-ACNC: NEGATIVE EU/DL
WBC # BLD AUTO: 10.57 K/UL (ref 3.9–12.7)
WBC #/AREA URNS HPF: 6 /HPF (ref 0–5)
YEAST URNS QL MICRO: ABNORMAL

## 2022-06-19 PROCEDURE — 85025 COMPLETE CBC W/AUTO DIFF WBC: CPT | Performed by: EMERGENCY MEDICINE

## 2022-06-19 PROCEDURE — 96374 THER/PROPH/DIAG INJ IV PUSH: CPT

## 2022-06-19 PROCEDURE — 80048 BASIC METABOLIC PNL TOTAL CA: CPT | Mod: XB | Performed by: INTERNAL MEDICINE

## 2022-06-19 PROCEDURE — 83930 ASSAY OF BLOOD OSMOLALITY: CPT | Performed by: EMERGENCY MEDICINE

## 2022-06-19 PROCEDURE — 93010 ELECTROCARDIOGRAM REPORT: CPT | Mod: ,,, | Performed by: INTERNAL MEDICINE

## 2022-06-19 PROCEDURE — 81000 URINALYSIS NONAUTO W/SCOPE: CPT | Mod: 59 | Performed by: EMERGENCY MEDICINE

## 2022-06-19 PROCEDURE — 84443 ASSAY THYROID STIM HORMONE: CPT | Performed by: EMERGENCY MEDICINE

## 2022-06-19 PROCEDURE — 99291 CRITICAL CARE FIRST HOUR: CPT | Mod: 25

## 2022-06-19 PROCEDURE — 82077 ASSAY SPEC XCP UR&BREATH IA: CPT | Performed by: EMERGENCY MEDICINE

## 2022-06-19 PROCEDURE — 25000003 PHARM REV CODE 250: Performed by: EMERGENCY MEDICINE

## 2022-06-19 PROCEDURE — 82803 BLOOD GASES ANY COMBINATION: CPT

## 2022-06-19 PROCEDURE — 83735 ASSAY OF MAGNESIUM: CPT | Performed by: EMERGENCY MEDICINE

## 2022-06-19 PROCEDURE — 80053 COMPREHEN METABOLIC PANEL: CPT | Performed by: EMERGENCY MEDICINE

## 2022-06-19 PROCEDURE — 99900035 HC TECH TIME PER 15 MIN (STAT)

## 2022-06-19 PROCEDURE — 93010 EKG 12-LEAD: ICD-10-PCS | Mod: ,,, | Performed by: INTERNAL MEDICINE

## 2022-06-19 PROCEDURE — 83605 ASSAY OF LACTIC ACID: CPT | Performed by: INTERNAL MEDICINE

## 2022-06-19 PROCEDURE — 82962 GLUCOSE BLOOD TEST: CPT

## 2022-06-19 PROCEDURE — 85610 PROTHROMBIN TIME: CPT | Performed by: EMERGENCY MEDICINE

## 2022-06-19 PROCEDURE — 82010 KETONE BODYS QUAN: CPT | Performed by: EMERGENCY MEDICINE

## 2022-06-19 PROCEDURE — 80143 DRUG ASSAY ACETAMINOPHEN: CPT | Performed by: EMERGENCY MEDICINE

## 2022-06-19 PROCEDURE — 96361 HYDRATE IV INFUSION ADD-ON: CPT

## 2022-06-19 PROCEDURE — 99292 CRITICAL CARE ADDL 30 MIN: CPT

## 2022-06-19 PROCEDURE — 80179 DRUG ASSAY SALICYLATE: CPT | Performed by: EMERGENCY MEDICINE

## 2022-06-19 PROCEDURE — 93005 ELECTROCARDIOGRAM TRACING: CPT

## 2022-06-19 PROCEDURE — 63600175 PHARM REV CODE 636 W HCPCS: Performed by: INTERNAL MEDICINE

## 2022-06-19 PROCEDURE — 84439 ASSAY OF FREE THYROXINE: CPT | Performed by: EMERGENCY MEDICINE

## 2022-06-19 PROCEDURE — 83880 ASSAY OF NATRIURETIC PEPTIDE: CPT | Performed by: EMERGENCY MEDICINE

## 2022-06-19 PROCEDURE — 80307 DRUG TEST PRSMV CHEM ANLYZR: CPT | Performed by: EMERGENCY MEDICINE

## 2022-06-19 PROCEDURE — 25000003 PHARM REV CODE 250: Performed by: INTERNAL MEDICINE

## 2022-06-19 PROCEDURE — 12000002 HC ACUTE/MED SURGE SEMI-PRIVATE ROOM

## 2022-06-19 PROCEDURE — 84484 ASSAY OF TROPONIN QUANT: CPT | Performed by: EMERGENCY MEDICINE

## 2022-06-19 RX ORDER — ONDANSETRON 2 MG/ML
4 INJECTION INTRAMUSCULAR; INTRAVENOUS EVERY 6 HOURS PRN
Status: DISCONTINUED | OUTPATIENT
Start: 2022-06-19 | End: 2022-06-20

## 2022-06-19 RX ORDER — ATORVASTATIN CALCIUM 40 MG/1
80 TABLET, FILM COATED ORAL NIGHTLY
Status: DISCONTINUED | OUTPATIENT
Start: 2022-06-19 | End: 2022-06-21 | Stop reason: HOSPADM

## 2022-06-19 RX ORDER — PNV NO.95/FERROUS FUM/FOLIC AC 28MG-0.8MG
1000 TABLET ORAL DAILY
COMMUNITY

## 2022-06-19 RX ORDER — LANOLIN ALCOHOL/MO/W.PET/CERES
100 CREAM (GRAM) TOPICAL DAILY
COMMUNITY

## 2022-06-19 RX ORDER — NITROGLYCERIN 0.4 MG/1
0.4 TABLET SUBLINGUAL EVERY 5 MIN PRN
Status: DISCONTINUED | OUTPATIENT
Start: 2022-06-19 | End: 2022-06-21 | Stop reason: HOSPADM

## 2022-06-19 RX ORDER — NICARDIPINE HYDROCHLORIDE 0.2 MG/ML
0-15 INJECTION INTRAVENOUS CONTINUOUS
Status: DISCONTINUED | OUTPATIENT
Start: 2022-06-19 | End: 2022-06-20

## 2022-06-19 RX ORDER — NICARDIPINE HYDROCHLORIDE 0.2 MG/ML
0-15 INJECTION INTRAVENOUS CONTINUOUS
Status: DISCONTINUED | OUTPATIENT
Start: 2022-06-19 | End: 2022-06-19

## 2022-06-19 RX ORDER — CLINDAMYCIN PHOSPHATE 600 MG/50ML
600 INJECTION, SOLUTION INTRAVENOUS
Status: DISCONTINUED | OUTPATIENT
Start: 2022-06-19 | End: 2022-06-19

## 2022-06-19 RX ORDER — MELATONIN 10 MG
10 CAPSULE ORAL NIGHTLY
COMMUNITY

## 2022-06-19 RX ORDER — HEPARIN SODIUM 5000 [USP'U]/ML
5000 INJECTION, SOLUTION INTRAVENOUS; SUBCUTANEOUS
Status: DISCONTINUED | OUTPATIENT
Start: 2022-06-19 | End: 2022-06-20

## 2022-06-19 RX ORDER — CHOLECALCIFEROL (VITAMIN D3) 25 MCG
4000 TABLET ORAL DAILY
Status: DISCONTINUED | OUTPATIENT
Start: 2022-06-20 | End: 2022-06-21 | Stop reason: HOSPADM

## 2022-06-19 RX ORDER — LANOLIN ALCOHOL/MO/W.PET/CERES
1 CREAM (GRAM) TOPICAL DAILY
Refills: 11 | Status: DISCONTINUED | OUTPATIENT
Start: 2022-06-20 | End: 2022-06-21 | Stop reason: HOSPADM

## 2022-06-19 RX ORDER — TALC
6 POWDER (GRAM) TOPICAL NIGHTLY PRN
Status: DISCONTINUED | OUTPATIENT
Start: 2022-06-19 | End: 2022-06-20

## 2022-06-19 RX ORDER — ACETAMINOPHEN 325 MG/1
650 TABLET ORAL EVERY 4 HOURS PRN
Status: DISCONTINUED | OUTPATIENT
Start: 2022-06-19 | End: 2022-06-20

## 2022-06-19 RX ORDER — SODIUM CHLORIDE 9 MG/ML
125 INJECTION, SOLUTION INTRAVENOUS CONTINUOUS
Status: DISCONTINUED | OUTPATIENT
Start: 2022-06-19 | End: 2022-06-20

## 2022-06-19 RX ORDER — HYDROCODONE BITARTRATE AND ACETAMINOPHEN 5; 325 MG/1; MG/1
1 TABLET ORAL EVERY 6 HOURS PRN
Status: DISCONTINUED | OUTPATIENT
Start: 2022-06-19 | End: 2022-06-20

## 2022-06-19 RX ORDER — NAPROXEN SODIUM 220 MG/1
81 TABLET, FILM COATED ORAL DAILY
Status: DISCONTINUED | OUTPATIENT
Start: 2022-06-20 | End: 2022-06-21 | Stop reason: HOSPADM

## 2022-06-19 RX ORDER — SODIUM CHLORIDE 0.9 % (FLUSH) 0.9 %
10 SYRINGE (ML) INJECTION
Status: DISCONTINUED | OUTPATIENT
Start: 2022-06-19 | End: 2022-06-20

## 2022-06-19 RX ORDER — PANTOPRAZOLE SODIUM 40 MG/1
40 TABLET, DELAYED RELEASE ORAL DAILY
Status: DISCONTINUED | OUTPATIENT
Start: 2022-06-20 | End: 2022-06-21 | Stop reason: HOSPADM

## 2022-06-19 RX ORDER — FLUOXETINE HYDROCHLORIDE 20 MG/1
40 CAPSULE ORAL DAILY
Status: DISCONTINUED | OUTPATIENT
Start: 2022-06-20 | End: 2022-06-21 | Stop reason: HOSPADM

## 2022-06-19 RX ORDER — ATENOLOL 25 MG/1
50 TABLET ORAL 2 TIMES DAILY
Status: DISCONTINUED | OUTPATIENT
Start: 2022-06-19 | End: 2022-06-21 | Stop reason: HOSPADM

## 2022-06-19 RX ORDER — CHLORHEXIDINE GLUCONATE ORAL RINSE 1.2 MG/ML
15 SOLUTION DENTAL 2 TIMES DAILY
Status: DISCONTINUED | OUTPATIENT
Start: 2022-06-19 | End: 2022-06-20

## 2022-06-19 RX ORDER — AMOXICILLIN 250 MG
1 CAPSULE ORAL 2 TIMES DAILY PRN
Status: DISCONTINUED | OUTPATIENT
Start: 2022-06-19 | End: 2022-06-20

## 2022-06-19 RX ORDER — GABAPENTIN 300 MG/1
600 CAPSULE ORAL 2 TIMES DAILY
Status: DISCONTINUED | OUTPATIENT
Start: 2022-06-19 | End: 2022-06-21 | Stop reason: HOSPADM

## 2022-06-19 RX ADMIN — SODIUM CHLORIDE 125 ML/HR: 0.9 INJECTION, SOLUTION INTRAVENOUS at 09:06

## 2022-06-19 RX ADMIN — CEFTRIAXONE 1 G: 1 INJECTION, SOLUTION INTRAVENOUS at 08:06

## 2022-06-19 RX ADMIN — NICARDIPINE HYDROCHLORIDE 2.5 MG/HR: 0.2 INJECTION, SOLUTION INTRAVENOUS at 06:06

## 2022-06-19 RX ADMIN — SODIUM CHLORIDE 2 UNITS/HR: 9 INJECTION, SOLUTION INTRAVENOUS at 10:06

## 2022-06-19 RX ADMIN — SODIUM CHLORIDE 1000 ML: 0.9 INJECTION, SOLUTION INTRAVENOUS at 06:06

## 2022-06-19 RX ADMIN — SODIUM CHLORIDE 3 UNITS/HR: 9 INJECTION, SOLUTION INTRAVENOUS at 08:06

## 2022-06-19 RX ADMIN — HEPARIN SODIUM 5000 UNITS: 5000 INJECTION INTRAVENOUS; SUBCUTANEOUS at 09:06

## 2022-06-19 NOTE — ED PROVIDER NOTES
"Encounter Date: 6/19/2022    SCRIBE #1 NOTE: I, Rosa Santana, am scribing for, and in the presence of,  Mina Spear MD. I have scribed the following portions of the note - Other sections scribed: HPI, ROS, PE.       History     Chief Complaint   Patient presents with    Drooling     Pt daughter reporting episodes of drooling, and possible seizure like activity. Pt does not have a hx of of seizures. Daughter reporting pt able to talk during "seizure like " episodes. Pt Pt reporting slurred speech yesterday when she checked in . No weakness, no blurry vision. Pt reporting she is very thirsty and CBG was 600 at home today at 1230.      Lorena Contreras is a 71 y.o female with a PMHx of CAD, DM type 2, fibromyalgia, HTN, MI, and stroke presenting to the ED with a chief complaint of altered mental status. Per daughter, the patient has been having "episodes" of drooling, confusion, and being unable to communicate. Daughter states the patient's last normal was 2 days ago, and that she was slurring her speech yesterday (has since resolved). Daughter notes that the patient's CBG at home earlier today was over 600. Patient does have cardiac stents but no pacemaker or defibrillator. No facial droop, numbness, or weakness noted.     The history is provided by the patient. The history is limited by the condition of the patient. No  was used.     Review of patient's allergies indicates:   Allergen Reactions    Novolin 70/30 (semi-synthetic) Nausea And Vomiting     Severe vomiting on Relion 70/30    Shellfish containing products Swelling    Sulfa (sulfonamide antibiotics) Anaphylaxis    Talwin [pentazocine lactate] Anaphylaxis    Victoza [liraglutide] Nausea And Vomiting    Glipizide Nausea Only    Citric acid     Codeine     Influenza virus vaccines Hives    Iodine and iodide containing products Hives    Rituxan [rituximab] Hives    Zoloft [sertraline] Nausea And Vomiting     Past " Medical History:   Diagnosis Date    Allergy     Anemia     Anxiety     Arthritis     Cataract     both removed    Colon polyps     Coronary artery disease     Depression     Diabetes mellitus, type II     Disorder of kidney and ureter     Fibromyalgia     Follicular lymphoma     GERD (gastroesophageal reflux disease)     HTN (hypertension)     Hyperlipidemia     MI (myocardial infarction) 03/2019    Restless leg syndrome     Stroke      Past Surgical History:   Procedure Laterality Date    COLONOSCOPY  11/07/2012    Colon polyp found; repeat in 5 years    ELBOW SURGERY Right 2015    dislocation repair     ESOPHAGOGASTRODUODENOSCOPY  11/07/2012    atrophic gastritis, H pylori testing negative    INCISION AND DRAINAGE FOOT Right 6/2/2021    Procedure: INCISION AND DRAINAGE, FOOT, bone biopsy;  Surgeon: Quiana Penn DPM;  Location: [facility] OR;  Service: Podiatry;  Laterality: Right;    KNEE SURGERY Bilateral 2015    scoped    LEFT HEART CATHETERIZATION Left 3/29/2019    Procedure: Left heart cath;  Surgeon: Bladimir Barbosa MD;  Location: [facility] CATH LAB;  Service: Cardiology;  Laterality: Left;    LEFT HEART CATHETERIZATION Left 11/18/2019    Procedure: Left heart cath;  Surgeon: Karl Rico MD;  Location: [facility] CATH LAB;  Service: Cardiology;  Laterality: Left;    LEFT HEART CATHETERIZATION Left 1/8/2020    Procedure: Left heart cath, right radial, noon start;  Surgeon: Christos Monreal MD;  Location: [facility] CATH LAB;  Service: Cardiology;  Laterality: Left;  RN Pre Op 1-6-20.  To be admitted 1-7-20 sor Aspirin Disensitation    TONSILLECTOMY  1955    ULTRASOUND GUIDANCE  1/8/2020    Procedure: Ultrasound Guidance;  Surgeon: Christos Monreal MD;  Location: [facility] CATH LAB;  Service: Cardiology;;     Family History   Problem Relation Age of Onset    Cancer Mother         colon    Heart disease Mother     Cancer Father         lung    Lung cancer Brother     Diabetes Sister      Hypertension Sister     Allergy (severe) Daughter     No Known Problems Daughter     Stroke Neg Hx     Hyperlipidemia Neg Hx      Social History     Tobacco Use    Smoking status: Never Smoker    Smokeless tobacco: Never Used   Substance Use Topics    Alcohol use: Not Currently    Drug use: Never     Review of Systems   Unable to perform ROS: Mental status change   Endocrine:        (+) hyperglycemia   Neurological: Negative for facial asymmetry, speech difficulty, weakness and numbness.   Psychiatric/Behavioral: Positive for confusion (altered mental status).       Physical Exam     Initial Vitals [06/19/22 1747]   BP Pulse Resp Temp SpO2   (!) 223/96 90 18 98.3 °F (36.8 °C) 100 %      MAP       --         Physical Exam    Nursing note and vitals reviewed.  Constitutional: She appears well-developed and well-nourished. She is not diaphoretic. No distress.   HENT:   Head: Normocephalic and atraumatic.   Mouth/Throat: Oropharynx is clear and moist.   Eyes: Conjunctivae and EOM are normal. Pupils are equal, round, and reactive to light. Right eye exhibits no discharge. Left eye exhibits no discharge. No scleral icterus.   Neck: Neck supple. No thyromegaly present. No tracheal deviation present. No JVD present.   Normal range of motion.  Cardiovascular: Normal rate, regular rhythm, normal heart sounds and intact distal pulses. Exam reveals no gallop and no friction rub.    No murmur heard.  Pulmonary/Chest: Breath sounds normal. No stridor. No respiratory distress. She has no wheezes. She has no rhonchi. She has no rales. She exhibits no tenderness.   Abdominal: Abdomen is soft. She exhibits no distension and no mass. There is no abdominal tenderness. There is no rebound and no guarding.   Musculoskeletal:         General: No tenderness or edema. Normal range of motion.      Cervical back: Normal range of motion and neck supple.     Lymphadenopathy:     She has no cervical adenopathy.   Neurological: She is  alert. She has normal strength. No cranial nerve deficit. GCS score is 15. GCS eye subscore is 4. GCS verbal subscore is 5. GCS motor subscore is 6.   MADISON with 5/5 strength.  F2N and H2S intact.  Steady gait with no ataxia.  Pt is laughing inappropriately and delirious     Skin: Skin is warm. Capillary refill takes less than 2 seconds. No rash and no abscess noted. No erythema. No pallor.   Psychiatric: Her behavior is normal. Her affect is inappropriate. Her speech is tangential. She is not actively hallucinating. Thought content is not paranoid and not delusional. Cognition and memory are impaired. She expresses impulsivity and inappropriate judgment. She expresses no homicidal and no suicidal ideation. She expresses no suicidal plans and no homicidal plans. She exhibits abnormal recent memory and abnormal remote memory. She is inattentive.         ED Course   Critical Care    Date/Time: 6/19/2022 8:36 PM  Performed by: Mina Spear MD  Authorized by: Mina Spear MD   Direct patient critical care time: 45 minutes  Additional history critical care time: 5 minutes  Ordering / reviewing critical care time: 15 minutes  Documentation critical care time: 10 minutes  Consulting other physicians critical care time: 10 minutes  Consult with family critical care time: 5 minutes  Total critical care time (exclusive of procedural time) : 90 minutes  Critical care was necessary to treat or prevent imminent or life-threatening deterioration of the following conditions: CNS failure or compromise and endocrine crisis.  Critical care was time spent personally by me on the following activities: discussions with consultants, evaluation of patient's response to treatment, obtaining history from patient or surrogate, ordering and review of laboratory studies, review of old charts, pulse oximetry, development of treatment plan with patient or surrogate, examination of patient, ordering and performing treatments and  interventions, ordering and review of radiographic studies and re-evaluation of patient's condition.        Labs Reviewed   COMPREHENSIVE METABOLIC PANEL - Abnormal; Notable for the following components:       Result Value    Sodium 128 (*)     CO2 22 (*)     Glucose 656 (*)     Alkaline Phosphatase 146 (*)     eGFR if  44 (*)     eGFR if non  38 (*)     All other components within normal limits    Narrative:      GLUCOSE  critical result(s) called and verbal readback obtained from   CARLOS ALFARO by LB1 06/19/2022 19:03   BETA - HYDROXYBUTYRATE, SERUM - Abnormal; Notable for the following components:    Beta-Hydroxybutyrate 1.3 (*)     All other components within normal limits   URINALYSIS, REFLEX TO URINE CULTURE - Abnormal; Notable for the following components:    Protein, UA 1+ (*)     Glucose, UA 4+ (*)     Ketones, UA Trace (*)     Occult Blood UA Trace (*)     Nitrite, UA Positive (*)     Leukocytes, UA 1+ (*)     All other components within normal limits    Narrative:     Specimen Source->Urine   B-TYPE NATRIURETIC PEPTIDE - Abnormal; Notable for the following components:     (*)     All other components within normal limits   TSH - Abnormal; Notable for the following components:    TSH 0.377 (*)     All other components within normal limits   ACETAMINOPHEN LEVEL - Abnormal; Notable for the following components:    Acetaminophen (Tylenol), Serum <3.0 (*)     All other components within normal limits   SALICYLATE LEVEL - Abnormal; Notable for the following components:    Salicylate Lvl <5.0 (*)     All other components within normal limits   URINALYSIS MICROSCOPIC - Abnormal; Notable for the following components:    WBC, UA 6 (*)     Bacteria Many (*)     All other components within normal limits    Narrative:     Specimen Source->Urine   POCT GLUCOSE - Abnormal; Notable for the following components:    POCT Glucose >500 (*)     All other components within normal limits    ISTAT PROCEDURE - Abnormal; Notable for the following components:    POC PCO2 46.4 (*)     POC PO2 18 (*)     POC SATURATED O2 24 (*)     All other components within normal limits   CBC W/ AUTO DIFFERENTIAL   TROPONIN I   MAGNESIUM   PROTIME-INR   OSMOLALITY, SERUM   ALCOHOL,MEDICAL (ETHANOL)   DRUG SCREEN PANEL, URINE EMERGENCY    Narrative:     Specimen Source->Urine   T4, FREE   OSMOLALITY, SERUM   BASIC METABOLIC PANEL   LACTIC ACID, PLASMA   POCT GLUCOSE MONITORING CONTINUOUS   POCT GLUCOSE MONITORING CONTINUOUS     EKG Readings: (Independently Interpreted)   Initial Reading: No STEMI. Rhythm: Normal Sinus Rhythm. Heart Rate: 86. Ectopy: No Ectopy. Conduction: Normal. ST Segments: Normal ST Segments. T Waves Flipped: AVR and V1. Axis: Normal. Clinical Impression: Normal Sinus Rhythm       Imaging Results          CT Head Without Contrast (Final result)  Result time 06/19/22 19:39:46    Final result by Juan Ramon Jackson MD (06/19/22 19:39:46)                 Impression:      No acute intracranial process.    Changes of chronic small vessel ischemic disease and cerebral volume loss.      Electronically signed by: Juan Ramon Jackson MD  Date:    06/19/2022  Time:    19:39             Narrative:    EXAMINATION:  CT HEAD WITHOUT CONTRAST    CLINICAL HISTORY:  Mental status change, unknown cause;    TECHNIQUE:  Low dose axial images were obtained through the head.  Coronal and sagittal reformations were also performed. Contrast was not administered.    COMPARISON:  None.    FINDINGS:  The subcutaneous tissues are unremarkable.  The bony calvarium is intact.  The paranasal sinuses unremarkable.  The mastoid air cells are clear.  There are postoperative changes in the orbits.    The craniocervical junction is intact.  There is a cavum septum pellucidum.  The remainder of the midline structures are unremarkable.  There are no extra-axial fluid collections.  There is no evidence of intracranial hemorrhage.  The ventricles and  sulci are prominent, suggestive of cerebral volume loss.  There are scattered hypodensities within the periventricular and subcortical white matter.  The gray-white differentiation is maintained.  There is no dense vessel sign.  There is no evidence of mass effect.                               X-Ray Chest AP Portable (Final result)  Result time 06/19/22 18:43:18    Final result by Leatha Palomo MD (06/19/22 18:43:18)                 Impression:      No acute cardiopulmonary process identified.      Electronically signed by: Leatha Palomo MD  Date:    06/19/2022  Time:    18:43             Narrative:    EXAMINATION:  XR CHEST AP PORTABLE    CLINICAL HISTORY:  altered mental status;    TECHNIQUE:  Single frontal view of the chest was performed.    COMPARISON:  September 2021.    FINDINGS:  Cardiac silhouette is normal in size.  Lungs are symmetrically expanded.  No evidence of focal consolidative process, pneumothorax, or significant pleural effusion.  No acute osseous abnormality identified.                                 Medications   niCARdipine 40 mg/200 mL (0.2 mg/mL) infusion (5 mg/hr Intravenous No Dose/Rate Change 6/19/22 1932)   aspirin chewable tablet 81 mg (has no administration in time range)   atenoloL tablet 50 mg (has no administration in time range)   atorvastatin tablet 80 mg (has no administration in time range)   chlorhexidine 0.12 % solution 15 mL (has no administration in time range)   cholecalciferol (vitamin D3) capsule 4,000 Units (has no administration in time range)   ferrous sulfate tablet 1 each (has no administration in time range)   FLUoxetine capsule 40 mg (has no administration in time range)   gabapentin capsule 600 mg (has no administration in time range)   HYDROcodone-acetaminophen 5-325 mg per tablet 1 tablet (has no administration in time range)   nitroGLYCERIN SL tablet 0.4 mg (has no administration in time range)   pantoprazole EC tablet 40 mg (has no administration in  time range)   ticagrelor tablet 90 mg (has no administration in time range)   sodium chloride 0.9% flush 10 mL (has no administration in time range)   0.9%  NaCl infusion (has no administration in time range)   dextrose 50% injection 25 g (has no administration in time range)   dextrose 50% injection 12.5 g (has no administration in time range)   ondansetron injection 4 mg (has no administration in time range)   acetaminophen tablet 650 mg (has no administration in time range)   heparin (porcine) injection 5,000 Units (has no administration in time range)   insulin regular 1 Units/mL in sodium chloride 0.9% 100 mL infusion (has no administration in time range)   melatonin tablet 6 mg (has no administration in time range)   senna-docusate 8.6-50 mg per tablet 1 tablet (has no administration in time range)   cefTRIAXone (ROCEPHIN) 1 g/50 mL D5W IVPB (has no administration in time range)   promethazine (PHENERGAN) 12.5 mg in dextrose 5 % 50 mL IVPB (has no administration in time range)   sodium chloride 0.9% bolus 1,000 mL (1,000 mLs Intravenous New Bag 6/19/22 1823)   insulin regular bolus from bag/infusion 7.85 Units (7.85 Units Intravenous Bolus from Bag 6/19/22 2032)     Medical Decision Making:   History:   Old Medical Records: I decided to obtain old medical records.  Independently Interpreted Test(s):   I have ordered and independently interpreted EKG Reading(s) - see prior notes  Clinical Tests:   Lab Tests: Ordered and Reviewed  Radiological Study: Ordered and Reviewed  Medical Tests: Ordered and Reviewed          Scribe Attestation:   Scribe #1: I performed the above scribed service and the documentation accurately describes the services I performed. I attest to the accuracy of the note.                 Lorena Contreras is a 71 y.o female with a PMHx of CAD, DM type 2, fibromyalgia, HTN, MI, and stroke who presented with encephalopathy likely secondary to hypertensive emergency and / or HHNK with an initial  MAP of 150  mm Hg and currently on Cardene 5 mg / hr with MAP goal 120 mm Hg. Pt has no focal cortical or cerebellar deficits but is delirious. CT head w/o returned with no acute findings. EKG revealed no ischemic changes and the troponin returned unremarkable. as well.    BG is 656 but pH and bicarb are not indicative of DKA. Beta hydroxy is minimally elevated at 1.3. Pt discussed with Dr. Rubio of [facility] medicine and he will order the insulin infusion DKA protocol. UA findings were concerning for UTI so Dr. Rubio stated he will order Rocephin as well. Pt will be admitted to the ICU for further evaluation and management.    Mina Spear MD        Clinical Impression:   Final diagnoses:  [I16.1] Hypertensive emergency (Primary)  [E11.00] Uncontrolled type 2 diabetes mellitus with hyperosmolar nonketotic hyperglycemia  [G93.40] Encephalopathy  [N30.00] Acute cystitis without hematuria        I, Mina Spear, personally performed the services described in this documentation. All medical record entries made by the scribe were at my direction and in my presence.  I have reviewed the chart and agree that the record reflects my personal performance and is accurate and complete.    Mina Spear MD           ED Disposition Condition    Admit               Mina Spear MD  06/19/22 2037

## 2022-06-19 NOTE — ED TRIAGE NOTES
"Pt reports to ED via personal transportation with complaints of possible seizure like activity. Pts daughter at bedside reports pt starting having initial symptoms on Thursday, 4 days ago. Pt was having generalized fatigue, increased urination. Pt also reports that her "teeth are messed up from chemo so now she has an abscess." Pt has been on antibiotics for the abscess. Pt states she has been unable to eat anything due to it. Daughter at bedside reports that starting today, pt began having "episodes that resemble a stroke or seizure." Pt starts drooling, becoming unable to speak, and shakes uncontrollably in the hands and face. Pts daughter states she has had approximately 4 episodes just today. Pt has absolutely no complaints, denies SOB, chest pain, numbness/tingling, weakness, abdominal pain, n/v/d. Dr. Spear completed stroke rule out in triage- reports no focal neuro deficits, van negative. Pt is hypertensive and hyperglycemic, otherwise vitals WNL.   "

## 2022-06-20 PROBLEM — T82.898A: Status: RESOLVED | Noted: 2022-02-11 | Resolved: 2022-06-20

## 2022-06-20 LAB
ANION GAP SERPL CALC-SCNC: 8 MMOL/L (ref 8–16)
ANION GAP SERPL CALC-SCNC: 9 MMOL/L (ref 8–16)
AORTIC ROOT ANNULUS: 2.3 CM
AORTIC VALVE CUSP SEPERATION: 1.53 CM
AV INDEX (PROSTH): 0.5
AV MEAN GRADIENT: 19 MMHG
AV PEAK GRADIENT: 29 MMHG
AV VALVE AREA: 1.59 CM2
AV VELOCITY RATIO: 0.46
BASOPHILS # BLD AUTO: 0.14 K/UL (ref 0–0.2)
BASOPHILS NFR BLD: 1.1 % (ref 0–1.9)
BSA FOR ECHO PROCEDURE: 1.95 M2
BUN SERPL-MCNC: 17 MG/DL (ref 8–23)
BUN SERPL-MCNC: 17 MG/DL (ref 8–23)
CALCIUM SERPL-MCNC: 8.3 MG/DL (ref 8.7–10.5)
CALCIUM SERPL-MCNC: 8.8 MG/DL (ref 8.7–10.5)
CHLORIDE SERPL-SCNC: 105 MMOL/L (ref 95–110)
CHLORIDE SERPL-SCNC: 106 MMOL/L (ref 95–110)
CO2 SERPL-SCNC: 22 MMOL/L (ref 23–29)
CO2 SERPL-SCNC: 23 MMOL/L (ref 23–29)
CREAT SERPL-MCNC: 0.9 MG/DL (ref 0.5–1.4)
CREAT SERPL-MCNC: 1 MG/DL (ref 0.5–1.4)
CV ECHO LV RWT: 0.71 CM
DIFFERENTIAL METHOD: ABNORMAL
DOP CALC AO PEAK VEL: 2.71 M/S
DOP CALC AO VTI: 54.85 CM
DOP CALC LVOT AREA: 3.2 CM2
DOP CALC LVOT DIAMETER: 2.02 CM
DOP CALC LVOT PEAK VEL: 1.24 M/S
DOP CALC LVOT STROKE VOLUME: 87.19 CM3
DOP CALC MV VTI: 43.95 CM
DOP CALCLVOT PEAK VEL VTI: 27.22 CM
E WAVE DECELERATION TIME: 316.77 MSEC
E/A RATIO: 0.81
E/E' RATIO: 20.62 M/S
ECHO LV POSTERIOR WALL: 1.34 CM (ref 0.6–1.1)
EJECTION FRACTION: 75 %
EOSINOPHIL # BLD AUTO: 0.3 K/UL (ref 0–0.5)
EOSINOPHIL NFR BLD: 2.2 % (ref 0–8)
ERYTHROCYTE [DISTWIDTH] IN BLOOD BY AUTOMATED COUNT: 14.2 % (ref 11.5–14.5)
EST. GFR  (AFRICAN AMERICAN): >60 ML/MIN/1.73 M^2
EST. GFR  (AFRICAN AMERICAN): >60 ML/MIN/1.73 M^2
EST. GFR  (NON AFRICAN AMERICAN): 57 ML/MIN/1.73 M^2
EST. GFR  (NON AFRICAN AMERICAN): >60 ML/MIN/1.73 M^2
FRACTIONAL SHORTENING: 38 % (ref 28–44)
GLUCOSE SERPL-MCNC: 294 MG/DL (ref 70–110)
GLUCOSE SERPL-MCNC: 295 MG/DL (ref 70–110)
HCT VFR BLD AUTO: 39.3 % (ref 37–48.5)
HGB BLD-MCNC: 12.9 G/DL (ref 12–16)
HR MV ECHO: 90 BPM
IMM GRANULOCYTES # BLD AUTO: 0.06 K/UL (ref 0–0.04)
IMM GRANULOCYTES NFR BLD AUTO: 0.5 % (ref 0–0.5)
INTERVENTRICULAR SEPTUM: 1.3 CM (ref 0.6–1.1)
IVRT: 124.17 MSEC
LA MAJOR: 6.6 CM
LA MINOR: 6.04 CM
LA WIDTH: 5.13 CM
LEFT ATRIUM SIZE: 3.84 CM
LEFT ATRIUM VOLUME INDEX: 54.4 ML/M2
LEFT ATRIUM VOLUME: 105.62 CM3
LEFT INTERNAL DIMENSION IN SYSTOLE: 2.36 CM (ref 2.1–4)
LEFT VENTRICLE DIASTOLIC VOLUME INDEX: 32.02 ML/M2
LEFT VENTRICLE DIASTOLIC VOLUME: 62.11 ML
LEFT VENTRICLE MASS INDEX: 91 G/M2
LEFT VENTRICLE SYSTOLIC VOLUME INDEX: 10 ML/M2
LEFT VENTRICLE SYSTOLIC VOLUME: 19.41 ML
LEFT VENTRICULAR INTERNAL DIMENSION IN DIASTOLE: 3.8 CM (ref 3.5–6)
LEFT VENTRICULAR MASS: 177.17 G
LV LATERAL E/E' RATIO: 16.75 M/S
LV SEPTAL E/E' RATIO: 26.8 M/S
LYMPHOCYTES # BLD AUTO: 4.2 K/UL (ref 1–4.8)
LYMPHOCYTES NFR BLD: 33.8 % (ref 18–48)
MAGNESIUM SERPL-MCNC: 1.6 MG/DL (ref 1.6–2.6)
MCH RBC QN AUTO: 27.8 PG (ref 27–31)
MCHC RBC AUTO-ENTMCNC: 32.8 G/DL (ref 32–36)
MCV RBC AUTO: 85 FL (ref 82–98)
MONOCYTES # BLD AUTO: 0.9 K/UL (ref 0.3–1)
MONOCYTES NFR BLD: 7.2 % (ref 4–15)
MV MEAN GRADIENT: 8 MMHG
MV PEAK A VEL: 1.65 M/S
MV PEAK E VEL: 1.34 M/S
MV PEAK GRADIENT: 15 MMHG
MV STENOSIS PRESSURE HALF TIME: 91.86 MS
MV VALVE AREA BY CONTINUITY EQUATION: 1.98 CM2
MV VALVE AREA P 1/2 METHOD: 2.39 CM2
NEUTROPHILS # BLD AUTO: 6.8 K/UL (ref 1.8–7.7)
NEUTROPHILS NFR BLD: 55.2 % (ref 38–73)
NRBC BLD-RTO: 0 /100 WBC
OSMOLALITY SERPL: 313 MOSM/KG (ref 275–295)
PHOSPHATE SERPL-MCNC: 2.5 MG/DL (ref 2.7–4.5)
PISA TR MAX VEL: 3.54 M/S
PLATELET # BLD AUTO: 253 K/UL (ref 150–450)
PMV BLD AUTO: 12.8 FL (ref 9.2–12.9)
POCT GLUCOSE: 178 MG/DL (ref 70–110)
POCT GLUCOSE: 236 MG/DL (ref 70–110)
POCT GLUCOSE: 268 MG/DL (ref 70–110)
POCT GLUCOSE: 277 MG/DL (ref 70–110)
POCT GLUCOSE: 279 MG/DL (ref 70–110)
POCT GLUCOSE: 285 MG/DL (ref 70–110)
POCT GLUCOSE: 298 MG/DL (ref 70–110)
POCT GLUCOSE: 301 MG/DL (ref 70–110)
POCT GLUCOSE: 322 MG/DL (ref 70–110)
POCT GLUCOSE: 327 MG/DL (ref 70–110)
POCT GLUCOSE: 341 MG/DL (ref 70–110)
POCT GLUCOSE: 356 MG/DL (ref 70–110)
POTASSIUM SERPL-SCNC: 4.2 MMOL/L (ref 3.5–5.1)
POTASSIUM SERPL-SCNC: 4.4 MMOL/L (ref 3.5–5.1)
PULM VEIN S/D RATIO: 1.95
PV PEAK D VEL: 0.4 M/S
PV PEAK S VEL: 0.78 M/S
PV PEAK VELOCITY: 1.46 CM/S
RA MAJOR: 5.34 CM
RA PRESSURE: 8 MMHG
RA WIDTH: 3.91 CM
RBC # BLD AUTO: 4.64 M/UL (ref 4–5.4)
RIGHT VENTRICULAR END-DIASTOLIC DIMENSION: 3.79 CM
RV TISSUE DOPPLER FREE WALL SYSTOLIC VELOCITY 1 (APICAL 4 CHAMBER VIEW): 19.16 CM/S
SINUS: 2.7 CM
SODIUM SERPL-SCNC: 136 MMOL/L (ref 136–145)
SODIUM SERPL-SCNC: 137 MMOL/L (ref 136–145)
STJ: 1.85 CM
TDI LATERAL: 0.08 M/S
TDI SEPTAL: 0.05 M/S
TDI: 0.07 M/S
TR MAX PG: 50 MMHG
TRICUSPID ANNULAR PLANE SYSTOLIC EXCURSION: 2.5 CM
TV REST PULMONARY ARTERY PRESSURE: 58 MMHG
WBC # BLD AUTO: 12.38 K/UL (ref 3.9–12.7)

## 2022-06-20 PROCEDURE — 99222 1ST HOSP IP/OBS MODERATE 55: CPT | Mod: ,,, | Performed by: PSYCHIATRY & NEUROLOGY

## 2022-06-20 PROCEDURE — 80048 BASIC METABOLIC PNL TOTAL CA: CPT | Mod: 91 | Performed by: INTERNAL MEDICINE

## 2022-06-20 PROCEDURE — 84100 ASSAY OF PHOSPHORUS: CPT | Performed by: INTERNAL MEDICINE

## 2022-06-20 PROCEDURE — 21400001 HC TELEMETRY ROOM

## 2022-06-20 PROCEDURE — 97535 SELF CARE MNGMENT TRAINING: CPT

## 2022-06-20 PROCEDURE — 80048 BASIC METABOLIC PNL TOTAL CA: CPT | Performed by: INTERNAL MEDICINE

## 2022-06-20 PROCEDURE — C9399 UNCLASSIFIED DRUGS OR BIOLOG: HCPCS | Performed by: INTERNAL MEDICINE

## 2022-06-20 PROCEDURE — 25000003 PHARM REV CODE 250: Performed by: INTERNAL MEDICINE

## 2022-06-20 PROCEDURE — S5010 5% DEXTROSE AND 0.45% SALINE: HCPCS | Performed by: HOSPITALIST

## 2022-06-20 PROCEDURE — 63600175 PHARM REV CODE 636 W HCPCS: Performed by: INTERNAL MEDICINE

## 2022-06-20 PROCEDURE — 92610 EVALUATE SWALLOWING FUNCTION: CPT

## 2022-06-20 PROCEDURE — 99222 PR INITIAL HOSPITAL CARE,LEVL II: ICD-10-PCS | Mod: ,,, | Performed by: PSYCHIATRY & NEUROLOGY

## 2022-06-20 PROCEDURE — 83735 ASSAY OF MAGNESIUM: CPT | Performed by: INTERNAL MEDICINE

## 2022-06-20 PROCEDURE — 25000003 PHARM REV CODE 250: Performed by: HOSPITALIST

## 2022-06-20 PROCEDURE — 63600175 PHARM REV CODE 636 W HCPCS: Performed by: HOSPITALIST

## 2022-06-20 PROCEDURE — 85025 COMPLETE CBC W/AUTO DIFF WBC: CPT | Performed by: INTERNAL MEDICINE

## 2022-06-20 RX ORDER — DEXTROSE MONOHYDRATE AND SODIUM CHLORIDE 5; .45 G/100ML; G/100ML
INJECTION, SOLUTION INTRAVENOUS CONTINUOUS
Status: DISCONTINUED | OUTPATIENT
Start: 2022-06-20 | End: 2022-06-20

## 2022-06-20 RX ORDER — ONDANSETRON 2 MG/ML
4 INJECTION INTRAMUSCULAR; INTRAVENOUS EVERY 8 HOURS PRN
Status: DISCONTINUED | OUTPATIENT
Start: 2022-06-20 | End: 2022-06-21 | Stop reason: HOSPADM

## 2022-06-20 RX ORDER — INSULIN ASPART 100 [IU]/ML
6 INJECTION, SOLUTION INTRAVENOUS; SUBCUTANEOUS
Status: DISCONTINUED | OUTPATIENT
Start: 2022-06-20 | End: 2022-06-20

## 2022-06-20 RX ORDER — IBUPROFEN 200 MG
16 TABLET ORAL
Status: DISCONTINUED | OUTPATIENT
Start: 2022-06-20 | End: 2022-06-21 | Stop reason: HOSPADM

## 2022-06-20 RX ORDER — INSULIN ASPART 100 [IU]/ML
1-10 INJECTION, SOLUTION INTRAVENOUS; SUBCUTANEOUS
Status: DISCONTINUED | OUTPATIENT
Start: 2022-06-20 | End: 2022-06-21 | Stop reason: HOSPADM

## 2022-06-20 RX ORDER — SODIUM CHLORIDE 0.9 % (FLUSH) 0.9 %
10 SYRINGE (ML) INJECTION EVERY 12 HOURS PRN
Status: DISCONTINUED | OUTPATIENT
Start: 2022-06-20 | End: 2022-06-21 | Stop reason: HOSPADM

## 2022-06-20 RX ORDER — HYDROCHLOROTHIAZIDE 25 MG/1
25 TABLET ORAL DAILY
Status: DISCONTINUED | OUTPATIENT
Start: 2022-06-21 | End: 2022-06-21 | Stop reason: HOSPADM

## 2022-06-20 RX ORDER — MORPHINE SULFATE 4 MG/ML
1 INJECTION, SOLUTION INTRAMUSCULAR; INTRAVENOUS EVERY 4 HOURS PRN
Status: DISCONTINUED | OUTPATIENT
Start: 2022-06-20 | End: 2022-06-20

## 2022-06-20 RX ORDER — ENOXAPARIN SODIUM 100 MG/ML
40 INJECTION SUBCUTANEOUS EVERY 24 HOURS
Status: DISCONTINUED | OUTPATIENT
Start: 2022-06-20 | End: 2022-06-21 | Stop reason: HOSPADM

## 2022-06-20 RX ORDER — BISACODYL 10 MG
10 SUPPOSITORY, RECTAL RECTAL DAILY PRN
Status: DISCONTINUED | OUTPATIENT
Start: 2022-06-20 | End: 2022-06-21 | Stop reason: HOSPADM

## 2022-06-20 RX ORDER — ACETAMINOPHEN 650 MG/1
650 SUPPOSITORY RECTAL EVERY 4 HOURS PRN
Status: DISCONTINUED | OUTPATIENT
Start: 2022-06-20 | End: 2022-06-21 | Stop reason: HOSPADM

## 2022-06-20 RX ORDER — ISOSORBIDE MONONITRATE 30 MG/1
30 TABLET, EXTENDED RELEASE ORAL DAILY
Status: DISCONTINUED | OUTPATIENT
Start: 2022-06-21 | End: 2022-06-21 | Stop reason: HOSPADM

## 2022-06-20 RX ORDER — SODIUM CHLORIDE 9 MG/ML
INJECTION, SOLUTION INTRAVENOUS CONTINUOUS
Status: DISCONTINUED | OUTPATIENT
Start: 2022-06-20 | End: 2022-06-20

## 2022-06-20 RX ORDER — NALOXONE HCL 0.4 MG/ML
0.02 VIAL (ML) INJECTION
Status: DISCONTINUED | OUTPATIENT
Start: 2022-06-20 | End: 2022-06-21 | Stop reason: HOSPADM

## 2022-06-20 RX ORDER — GLUCAGON 1 MG
1 KIT INJECTION
Status: DISCONTINUED | OUTPATIENT
Start: 2022-06-20 | End: 2022-06-21 | Stop reason: HOSPADM

## 2022-06-20 RX ORDER — IBUPROFEN 200 MG
24 TABLET ORAL
Status: DISCONTINUED | OUTPATIENT
Start: 2022-06-20 | End: 2022-06-21 | Stop reason: HOSPADM

## 2022-06-20 RX ORDER — MAGNESIUM SULFATE 1 G/100ML
1 INJECTION INTRAVENOUS ONCE
Status: COMPLETED | OUTPATIENT
Start: 2022-06-20 | End: 2022-06-20

## 2022-06-20 RX ORDER — PROCHLORPERAZINE EDISYLATE 5 MG/ML
5 INJECTION INTRAMUSCULAR; INTRAVENOUS EVERY 6 HOURS PRN
Status: DISCONTINUED | OUTPATIENT
Start: 2022-06-20 | End: 2022-06-20

## 2022-06-20 RX ORDER — INSULIN ASPART 100 [IU]/ML
10 INJECTION, SOLUTION INTRAVENOUS; SUBCUTANEOUS
Status: DISCONTINUED | OUTPATIENT
Start: 2022-06-20 | End: 2022-06-21

## 2022-06-20 RX ORDER — AMLODIPINE BESYLATE 5 MG/1
10 TABLET ORAL DAILY
Status: DISCONTINUED | OUTPATIENT
Start: 2022-06-21 | End: 2022-06-21 | Stop reason: HOSPADM

## 2022-06-20 RX ADMIN — INSULIN ASPART 10 UNITS: 100 INJECTION, SOLUTION INTRAVENOUS; SUBCUTANEOUS at 09:06

## 2022-06-20 RX ADMIN — GABAPENTIN 600 MG: 300 CAPSULE ORAL at 09:06

## 2022-06-20 RX ADMIN — ATENOLOL 50 MG: 25 TABLET ORAL at 09:06

## 2022-06-20 RX ADMIN — CHOLECALCIFEROL TAB 25 MCG (1000 UNIT) 4000 UNITS: 25 TAB at 10:06

## 2022-06-20 RX ADMIN — ENOXAPARIN SODIUM 40 MG: 100 INJECTION SUBCUTANEOUS at 05:06

## 2022-06-20 RX ADMIN — NICARDIPINE HYDROCHLORIDE 5 MG/HR: 0.2 INJECTION, SOLUTION INTRAVENOUS at 02:06

## 2022-06-20 RX ADMIN — INSULIN ASPART 10 UNITS: 100 INJECTION, SOLUTION INTRAVENOUS; SUBCUTANEOUS at 05:06

## 2022-06-20 RX ADMIN — FLUOXETINE 40 MG: 20 CAPSULE ORAL at 09:06

## 2022-06-20 RX ADMIN — CHLORHEXIDINE GLUCONATE 0.12% ORAL RINSE 15 ML: 1.2 LIQUID ORAL at 09:06

## 2022-06-20 RX ADMIN — INSULIN ASPART 3 UNITS: 100 INJECTION, SOLUTION INTRAVENOUS; SUBCUTANEOUS at 09:06

## 2022-06-20 RX ADMIN — ATORVASTATIN CALCIUM 80 MG: 40 TABLET, FILM COATED ORAL at 09:06

## 2022-06-20 RX ADMIN — DEXTROSE AND SODIUM CHLORIDE: 5; .45 INJECTION, SOLUTION INTRAVENOUS at 02:06

## 2022-06-20 RX ADMIN — ASPIRIN 81 MG CHEWABLE TABLET 81 MG: 81 TABLET CHEWABLE at 09:06

## 2022-06-20 RX ADMIN — FERROUS SULFATE TAB 325 MG (65 MG ELEMENTAL FE) 1 EACH: 325 (65 FE) TAB at 09:06

## 2022-06-20 RX ADMIN — SODIUM PHOSPHATE, MONOBASIC, MONOHYDRATE 15 MMOL: 276; 142 INJECTION, SOLUTION INTRAVENOUS at 09:06

## 2022-06-20 RX ADMIN — PANTOPRAZOLE SODIUM 40 MG: 40 TABLET, DELAYED RELEASE ORAL at 09:06

## 2022-06-20 RX ADMIN — SODIUM CHLORIDE: 0.9 INJECTION, SOLUTION INTRAVENOUS at 09:06

## 2022-06-20 RX ADMIN — TICAGRELOR 90 MG: 90 TABLET ORAL at 09:06

## 2022-06-20 RX ADMIN — CEFTRIAXONE 1 G: 1 INJECTION, SOLUTION INTRAVENOUS at 09:06

## 2022-06-20 RX ADMIN — INSULIN DETEMIR 25 UNITS: 100 INJECTION, SOLUTION SUBCUTANEOUS at 09:06

## 2022-06-20 RX ADMIN — INSULIN ASPART 2 UNITS: 100 INJECTION, SOLUTION INTRAVENOUS; SUBCUTANEOUS at 05:06

## 2022-06-20 RX ADMIN — DEXTROSE AND SODIUM CHLORIDE: 5; .45 INJECTION, SOLUTION INTRAVENOUS at 04:06

## 2022-06-20 RX ADMIN — INSULIN ASPART 8 UNITS: 100 INJECTION, SOLUTION INTRAVENOUS; SUBCUTANEOUS at 11:06

## 2022-06-20 RX ADMIN — HEPARIN SODIUM 5000 UNITS: 5000 INJECTION INTRAVENOUS; SUBCUTANEOUS at 05:06

## 2022-06-20 RX ADMIN — MAGNESIUM SULFATE 1 G: 1 INJECTION INTRAVENOUS at 09:06

## 2022-06-20 NOTE — ASSESSMENT & PLAN NOTE
BNP is only 226  Care with fluids  Repeating TTE in am    11/18/19 Echo:  · Normal left ventricular systolic function. The estimated ejection fraction is 60%  · Concentric left ventricular hypertrophy.  · Grade II (moderate) left ventricular diastolic dysfunction consistent with pseudonormalization.  · Normal right ventricular systolic function.  · Moderate left atrial enlargement.  · Mild tricuspid regurgitation.  · The estimated PA systolic pressure is 50 mm Hg  · Pulmonary hypertension present.

## 2022-06-20 NOTE — ASSESSMENT & PLAN NOTE
"March 29, 2019:  [facility] and PCI of RCA -  "Patient has serial mid 99% eccentric lesions consistent with plaque rupture site and abnormal EKG findings.... We initially pre-dilated with 3.0 balloon.  We placed a 3.0 by 12 mm resolute kenya stent in the midportion of the vessel.  We overlapped that with a 3.5 x 15 mm resolute kenya stent in the more proximal portion mid RCA.  Good result was achieved with MADINA 3 flow through the vessel.  Lad has a mid 90% stenosis and the left circumflex as well as the long 95% lesion as well.   Cardiac catheterization 01/08/2020:  1.  Successful PCI of proximal ramus with drug-eluting stent x1 (2.0 x 6 mm).  IVUS guidance was utilized for this PCI.  Post PCI IVUS demonstrated well-opposed and expanded stent.  MADINA 3 flow pre and post PCI.   2.  Successful PCI of circumflex with drug-eluting stent x1 (2.25 x 22 mm).  MADINA 3 flow pre and post PCI     Troponin negative   No chest pain currently   Continue asa  Current therapy:    ticagrelor tablet 90 mg, 90 mg, Oral, BID     nitroGLYCERIN SL tablet 0.4 mg, 0.4 mg, Sublingual, Q5 Min PRN    atenoloL tablet 50 mg, 50 mg, Oral, BID     atorvastatin tablet 80 mg, 80 mg, Oral, QHS   "

## 2022-06-20 NOTE — PROVIDER TRANSFER
Ms Lorena Contreras was admitted with DKA and HTN emergency after missing meds for 1 week. These both resolved with insulin gtt, IVF, and cardene gtt then starting home medications. She has had episodes of facial twitching starting 6/19 which are new and now decreasing in frequency. She is alert during them but confused and then not confused after. Neurology consulted.     To do  - follow up neuro consult recs  - monitor glucose  - likely home in AM    Zoila Ying MD  06/20/2022  3:17 PM

## 2022-06-20 NOTE — ASSESSMENT & PLAN NOTE
CT head with no acute findings   MRI brain to rule out occult CVA vs PRES   -Problems speaking and swallowing   -May be dental, but not sure  Neuro checks q4  ICU admit  Neurology consult if not better by am

## 2022-06-20 NOTE — PROGRESS NOTES
"[facility]  [facility] Medicine  Progress Note    Patient Name: Lorena Contreras  MRN: [id number]  Patient Class: IP- Inpatient   Admission Date: 6/19/2022  Length of Stay: 1 days  Attending Physician: Zoila Ying MD  Primary Care Provider: Jorge Paris MD        Subjective:     Principal Problem:Hypertensive emergency        HPI:  Ms. Contreras is a 72yo lady with a past medical history of Fe deficiency anemia, CAD, depression, DM2, follicular lymphoma, GERD, HTN, CVA, right mandibular molar extraction (about 1 month ago), and restless leg syndrome.    She was just seen in the ED yesterday with complaints of generalized weakness and, "glucose>500, feels "weak" for the last few days. also notes R maxillary dental pain, tooth extraction months ago at the site. The pt informs me that her glucose is chronically elevated despite treatment, sometimes in the 1000 range."  Dr. Perry noted, "Repeat glucose 498. Have written for additional insulin.  Glucose 369. Will write for 4 more units IV insulin and discharge her. She has an appt with her dentist next week. Will refer to endocrine to see about improved dm glycemic control."  She was discharged on Clindamycin 300mg po Q8 hours, Peridex 15 cc BID, and Clove oil.    She now returns to the ED with her sister due to worsening confusion and high glucose.  The patient is able to carry on a conversation with me and is semi-logical, but has puerile demeanor and is acting strangely.  She is laughing at odd times.  She is also confused as to why she is in the ED, and looks to the nurse to ask what is wrong with her.  Her sister is no longer in the ED.    She states she feels fine except for some problems speaking and swallowing, which she attributes to her recent dental issues (apparently a right lower molar was pulled, and a left lower molar had some pins placed and a root canal.  She denies terrance mouth pain, fever or chills.  She denies N/V/D.  She " "denies SOB or CP.  She is also having, "mouth quivering," problems speaking, problems swallowing (she failed her swallow study).    In the ED her VS's were BP (!) 243/175 -> see therapy below ->172/75   Pulse 91   Temp 99 °F (37.2 °C) (Rectal)   Resp 18   Wt 78.5 kg (173 lb)   SpO2 100%   Breastfeeding No   BMI 25.55 kg/m².  Labs showed normal CBC, INR 0.9.  CMP with Na 128, HCO3 22, Cr 1.4, Gluc 656, and normal LFT's, , trop 0.024, BHB 1.3, TSH 0.377.  VBG showed pH 7.35, PCO2 46. COVID yesterday was negative.    CT head showed no acute intracranial process, and changes of chronic small vessel ischemic disease and cerebral volume loss.  CXR showed no acute cardiopulmonary process identified. In the ED she was treated with NS 1L iv bolus.        Overview/Hospital Course:  Ms Lorena Contreras was admitted with DKA and HTN emergency after missing meds for 1 week. These both resolved with insulin gtt, IVF, and cardene gtt then starting home medications. She has had episodes of facial twitching starting 6/19 which are new and now decreasing in frequency. She is alert during them but confused and then not confused after. Neurology consulted.       Interval History: Feeling much better this morning. Facial twitching episodes have decreased in frequency. She is hungry. Denies pain. Did have urinary burning prior to admit.     Review of Systems   Constitutional:  Negative for chills and fever.   Respiratory:  Negative for cough and shortness of breath.    Cardiovascular:  Negative for chest pain and leg swelling.   Gastrointestinal:  Negative for abdominal pain, constipation, diarrhea, nausea and vomiting.   Genitourinary:  Positive for dysuria.   Musculoskeletal:  Negative for arthralgias and myalgias.   Neurological:  Negative for weakness, numbness and headaches.   Psychiatric/Behavioral:  Negative for confusion.    Objective:     Vital Signs (Most Recent):  Temp: 97.9 °F (36.6 °C) (06/20/22 " 0118)  Pulse: 74 (06/20/22 1347)  Resp: (!) 31 (06/20/22 1047)  BP: (!) 162/72 (06/20/22 1147)  SpO2: 96 % (06/20/22 1347)   Vital Signs (24h Range):  Temp:  [97.9 °F (36.6 °C)-99 °F (37.2 °C)] 97.9 °F (36.6 °C)  Pulse:  [74-98] 74  Resp:  [4-38] 31  SpO2:  [96 %-100 %] 96 %  BP: (124-243)/() 162/72     Weight: 78.5 kg (173 lb)  Body mass index is 25.55 kg/m².    Intake/Output Summary (Last 24 hours) at 6/20/2022 1506  Last data filed at 6/20/2022 0228  Gross per 24 hour   Intake 1697.85 ml   Output --   Net 1697.85 ml      Physical Exam  Vitals and nursing note reviewed.   Constitutional:       General: She is not in acute distress.     Appearance: She is not toxic-appearing.   HENT:      Head: Normocephalic and atraumatic.      Nose: Nose normal.      Mouth/Throat:      Mouth: Mucous membranes are moist.      Comments: Poor dentition  Eyes:      General: No scleral icterus.     Conjunctiva/sclera: Conjunctivae normal.   Cardiovascular:      Rate and Rhythm: Normal rate and regular rhythm.      Pulses: Normal pulses.      Heart sounds: Normal heart sounds. No murmur heard.    No gallop.   Pulmonary:      Effort: Pulmonary effort is normal. No respiratory distress.      Breath sounds: Normal breath sounds. No wheezing or rales.      Comments: Room air  Abdominal:      General: Bowel sounds are normal. There is no distension.      Palpations: Abdomen is soft.      Tenderness: There is no abdominal tenderness. There is no guarding.   Genitourinary:     Comments: Purewick in place  Musculoskeletal:      Right lower leg: No edema.      Left lower leg: No edema.   Skin:     General: Skin is warm and dry.   Neurological:      Mental Status: She is alert.       Significant Labs: All pertinent labs within the past 24 hours have been reviewed.    Significant Imaging: I have reviewed all pertinent imaging results/findings within the past 24 hours.      Assessment/Plan:      * Hypertensive emergency  Started on cardene -  "weaned off  Missed 5 days of Rx  Resume home Rx      Status post tooth extraction  Risk of anaerobes post extraction is very low  No signs of infection       Urinary tract infection without hematuria  UA with bacteria  Culture pending  Continues on CTX while awaiting culture results       Hypertensive encephalopathy  CT head with no acute findings  MRI brain no acute findings  Neurology consulted      Diastolic dysfunction with chronic heart failure  BNP is only 226  Given IVF. Appears euvolemic. Will DC now.   TTE normal EF. Mild AS. Mod MR.     Pulmonary hypertension -- echo 11/2019  PAP 50 on last echo  Group 2     Stage 3a chronic kidney disease  She has been trending up slightly  This is likely from uncontrolled HTN and HONK  Monitor daily  Cr back to 1  Resume home HCTZ    Coronary artery disease of native artery of native heart with stable angina pectoris  March 29, 2019:  C and PCI of RCA -  "Patient has serial mid 99% eccentric lesions consistent with plaque rupture site and abnormal EKG findings.... We initially pre-dilated with 3.0 balloon.  We placed a 3.0 by 12 mm resolute kenya stent in the midportion of the vessel.  We overlapped that with a 3.5 x 15 mm resolute kenya stent in the more proximal portion mid RCA.  Good result was achieved with MADINA 3 flow through the vessel.  Lad has a mid 90% stenosis and the left circumflex as well as the long 95% lesion as well.   Cardiac catheterization 01/08/2020:  1.  Successful PCI of proximal ramus with drug-eluting stent x1 (2.0 x 6 mm).  IVUS guidance was utilized for this PCI.  Post PCI IVUS demonstrated well-opposed and expanded stent.  MADINA 3 flow pre and post PCI.   2.  Successful PCI of circumflex with drug-eluting stent x1 (2.25 x 22 mm).  MADINA 3 flow pre and post PCI     Troponin negative   No chest pain currently   Continue asa  Current therapy:    ticagrelor tablet 90 mg, 90 mg, Oral, BID     nitroGLYCERIN SL tablet 0.4 mg, 0.4 mg, Sublingual, Q5 " Min PRN    atenoloL tablet 50 mg, 50 mg, Oral, BID     atorvastatin tablet 80 mg, 80 mg, Oral, QHS     Uncontrolled type 2 diabetes mellitus with hyperosmolar nonketotic hyperglycemia  Home regime:    insulin aspart protamine-insulin aspart (NOVOLOG MIX 70-30FLEXPEN U-100) 30 Units SQ 2 BID AC    metFORMIN (GLUCOPHAGE) 1000 MG tablet, Take 1 tablet (1,000 mg total) by mouth 2 (two) times daily with meals     Previously on insulin gtt  A1c:   Lab Results   Component Value Date    HGBA1C >14.0 (H) 05/26/2022     Meds: detemir + aspart insulin + SSI PRN to maintain goal 140-180  ADA diet, accuchecks, hypoglycemic protocol      Hyperlipidemia    atorvastatin tablet 80 mg, 80 mg, Oral, QHS     Follicular lymphoma  Follicular lymphoma diagnosed in late 2009. She was treated with chemotherapy (withour Rituximab due to allergy, unclear regimen) and apparently achieved a CR. She was then lost to follow up die to lack of insurance until 6/26/13 when she re-establish care in our clinic. Surveillance CTs from 7/9/13 are significant for unspecific some tissue prominence in the ventral tongue that is unspecific, unspecific pulmonary nodules and non bulky LAD in the internal jugular chain measuring up to 14 mm. These finding are unspecific and not indicative of follicular lymphoma recurrence.        VTE Risk Mitigation (From admission, onward)         Ordered     enoxaparin injection 40 mg  Daily         06/20/22 0735                Discharge Planning   MIKHAIL:      Code Status: Full Code   Is the patient medically ready for discharge?:     Reason for patient still in hospital (select all that apply): Patient trending condition                     Zoila Ying MD  Department of Hospital Medicine   [facility] - Emergency Dept

## 2022-06-20 NOTE — ASSESSMENT & PLAN NOTE
Goal MAP reduction of 20% for now  Holding home Norvasc  Check TTE in am (last 2019)    Current therapy:    niCARdipine 40 mg/200 mL (0.2 mg/mL) infusion, 0-15 mg/hr, Intravenous, Continuous    -Last Rate: 25 mL/hr at 06/19/22 1932, 5 mg/hr at 06/19/22 1932    atenoloL tablet 50 mg, 50 mg, Oral, BID    English

## 2022-06-20 NOTE — CONSULTS
[facility] - [place] - Telemetry  Neurology  Consult Note    Patient Name: Lorena Contreras  MRN: [id number]  Admission Date: 6/19/2022  Hospital Length of Stay: 1 days  Code Status: Full Code   Attending Provider: Zoila Ying MD   Consulting Provider: Shaq Spicer MD  Primary Care Physician: Jorge Paris MD  Principal Problem:Hypertensive emergency    Inpatient consult to Neurology  Consult performed by: Shaq Spicer MD  Consult ordered by: Zoila Ying MD        Subjective:     Chief Complaint:  Seizures?    HPI: 70 y/o female with medical Hx of HTN, DM comes to ED for confusion and hyperglycemia. Pt found to have a BP of 240/170's and BG in 700's. Pt has been consulted to neurology due to episode of right facial twitching. This happened in ED while pt was confused. Her daughter took video of it. Ms Contreras currently denies headache, visual or speech disturbance, vertigo, weakness or numbness of limbs. She does tell me that she has burning pain in her feet as result of peripheral neuropathy from DM.    Past Medical History:   Diagnosis Date    Allergy     Altered mental status 06/19/2022    DYSARTHRIA, SPASTIC MOVEMENTS & DIFFICULTY SWALLOWING    Anemia     Anxiety     Arthritis     Cataract     both removed    Colon polyps     Coronary artery disease     Depression     Diabetes mellitus, type II     Disorder of kidney and ureter     Fibromyalgia     Follicular lymphoma     GERD (gastroesophageal reflux disease)     HTN (hypertension)     Hyperlipidemia     MI (myocardial infarction) 03/2019    Restless leg syndrome     Stroke        Past Surgical History:   Procedure Laterality Date    COLONOSCOPY  11/07/2012    Colon polyp found; repeat in 5 years    ELBOW SURGERY Right 2015    dislocation repair     ESOPHAGOGASTRODUODENOSCOPY  11/07/2012    atrophic gastritis, H pylori testing negative    INCISION AND DRAINAGE FOOT Right 6/2/2021    Procedure: INCISION AND DRAINAGE, FOOT,  bone biopsy;  Surgeon: Quiana Penn DPM;  Location: [facility] OR;  Service: Podiatry;  Laterality: Right;    KNEE SURGERY Bilateral 2015    scoped    LEFT HEART CATHETERIZATION Left 3/29/2019    Procedure: Left heart cath;  Surgeon: Bladimir Barbosa MD;  Location: [facility] CATH LAB;  Service: Cardiology;  Laterality: Left;    LEFT HEART CATHETERIZATION Left 11/18/2019    Procedure: Left heart cath;  Surgeon: Karl Rico MD;  Location: [facility] CATH LAB;  Service: Cardiology;  Laterality: Left;    LEFT HEART CATHETERIZATION Left 1/8/2020    Procedure: Left heart cath, right radial, noon start;  Surgeon: Christos Monreal MD;  Location: [facility] CATH LAB;  Service: Cardiology;  Laterality: Left;  RN Pre Op 1-6-20.  To be admitted 1-7-20 sor Aspirin Disensitation    TONSILLECTOMY  1955    ULTRASOUND GUIDANCE  1/8/2020    Procedure: Ultrasound Guidance;  Surgeon: Christos Monreal MD;  Location: [facility] CATH LAB;  Service: Cardiology;;       Review of patient's allergies indicates:   Allergen Reactions    Novolin 70/30 (semi-synthetic) Nausea And Vomiting     Severe vomiting on Relion 70/30    Shellfish containing products Swelling    Sulfa (sulfonamide antibiotics) Anaphylaxis    Talwin [pentazocine lactate] Anaphylaxis    Victoza [liraglutide] Nausea And Vomiting    Glipizide Nausea Only    Citric acid     Codeine     Influenza virus vaccines Hives    Iodine and iodide containing products Hives    Rituxan [rituximab] Hives    Zoloft [sertraline] Nausea And Vomiting       Current Neurological Medications:     No current facility-administered medications on file prior to encounter.     Current Outpatient Medications on File Prior to Encounter   Medication Sig    acetaminophen (TYLENOL) 500 MG tablet Take 1 tablet (500 mg total) by mouth every 4 (four) hours as needed for Pain. (Patient taking differently: Take 1,000 mg by mouth every 4 (four) hours as needed for Pain.)    amLODIPine (NORVASC) 10 MG tablet TAKE 1  TABLET EVERY DAY    aspirin 81 MG Chew Take 1 tablet (81 mg total) by mouth once daily.    atenoloL (TENORMIN) 50 MG tablet Take 1 tablet (50 mg total) by mouth 2 (two) times daily.    atorvastatin (LIPITOR) 80 MG tablet Take 1 tablet (80 mg total) by mouth every evening.    Bacillus coagulans (DIGESTIVE ADVANTAGE IMMUNE ORAL) Take by mouth.    chlorhexidine (PERIDEX) 0.12 % solution Use as directed 15 mLs in the mouth or throat 2 (two) times daily. for 14 days    cholecalciferol, vitamin D3, (VITAMIN D3) 50 mcg (2,000 unit) Cap Take 2 capsules by mouth 2 (two) times daily.    cyanocobalamin (VITAMIN B-12) 1000 MCG tablet Take 100 mcg by mouth once daily.    diclofenac sodium (VOLTAREN TOP) Apply topically.    ferrous sulfate 325 (65 FE) MG EC tablet Take 1 tablet (325 mg total) by mouth once daily.    FLUoxetine 40 MG capsule Take 1 capsule (40 mg total) by mouth once daily. (Patient taking differently: Take 80 mg by mouth once daily.)    gabapentin (NEURONTIN) 300 MG capsule Take 2 capsules (600 mg total) by mouth 2 (two) times daily.    hydroCHLOROthiazide (HYDRODIURIL) 25 MG tablet Take 1 tablet (25 mg total) by mouth once daily.    insulin aspart protamine-insulin aspart (NOVOLOG MIX 70-30FLEXPEN U-100) 100 unit/mL (70-30) InPn pen Inject 30 Units into the skin 2 (two) times daily before meals. (Patient taking differently: Inject 30 Units into the skin 2 (two) times daily before meals. 35 units)    isosorbide mononitrate (IMDUR) 30 MG 24 hr tablet Take 1 tablet (30 mg total) by mouth once daily.    magnesium oxide (MAG-OX) 400 mg tablet Take 400 mg by mouth once daily.    meclizine (ANTIVERT) 25 mg tablet Take 1 tablet (25 mg total) by mouth 3 (three) times daily as needed for Dizziness.    melatonin 10 mg Cap Take 10 mg by mouth nightly.    metFORMIN (GLUCOPHAGE) 1000 MG tablet Take 1 tablet (1,000 mg total) by mouth 2 (two) times daily with meals.    multivitamin/iron/folic acid (CENTRUM  "COMPLETE ORAL) Take by mouth.    nitroGLYCERIN (NITROSTAT) 0.4 MG SL tablet PLACE 1 TABLET UNDER THE TONGUE EVERY FIVE MINUTES AS NEEDED FOR CHEST PAIN AS DIRECTED    pantoprazole (PROTONIX) 40 MG tablet Take 1 tablet (40 mg total) by mouth once daily.    ticagrelor (BRILINTA) 90 mg tablet Take 1 tablet (90 mg total) by mouth 2 (two) times daily.    vitamin E 1000 UNIT capsule Take 1,000 Units by mouth once daily.    [DISCONTINUED] clindamycin (CLEOCIN) 150 MG capsule Take 2 capsules (300 mg total) by mouth every 8 (eight) hours. for 10 days    [DISCONTINUED] ondansetron (ZOFRAN-ODT) 4 MG TbDL Take 1 tablet (4 mg total) by mouth every 8 (eight) hours as needed.    ASCORBIC ACID, VITAMIN C, ORAL Take by mouth once daily.    blood sugar diagnostic (FREESTYLE TEST) Strp 1 strip by Misc.(Non-Drug; Combo Route) route 4 (four) times daily.    EPINEPHrine (EPIPEN) 0.3 mg/0.3 mL AtIn Inject 0.3 mLs (0.3 mg total) into the muscle once. for 1 dose    ESOMEPRAZOLE MAGNESIUM ORAL Take by mouth as needed.    flavoring agent, bulk, (CLOVE FLAVORING) Oil 1 Dose by Misc.(Non-Drug; Combo Route) route every 4 (four) hours as needed (dental pain).    lancets 32 gauge Misc 1 lancet by Misc.(Non-Drug; Combo Route) route 4 (four) times daily.    pen needle, diabetic (BD ULTRA-FINE SAGAR PEN NEEDLE) 32 gauge x 5/32" Ndle USE WITH NOVOLOG MIX FLEXPENS    TRUEPLUS LANCETS 30 gauge Misc     [DISCONTINUED] HYDROcodone-acetaminophen (NORCO) 5-325 mg per tablet Take 1 tablet by mouth every 6 (six) hours as needed (severe pain only).      Family History     Problem Relation (Age of Onset)    Allergy (severe) Daughter    Cancer Mother, Father    Diabetes Sister    Heart disease Mother    Hypertension Sister    Lung cancer Brother    No Known Problems Daughter        Tobacco Use    Smoking status: Never Smoker    Smokeless tobacco: Never Used   Substance and Sexual Activity    Alcohol use: Not Currently    Drug use: Never    " Sexual activity: Not Currently     Partners: Male     Review of Systems   Constitutional: Negative for fever.   HENT: Negative for trouble swallowing.    Eyes: Negative for photophobia.   Respiratory: Negative for shortness of breath.    Cardiovascular: Negative for chest pain.   Gastrointestinal: Negative for abdominal pain.   Genitourinary: Negative for dysuria.   Musculoskeletal: Negative for back pain.   Neurological: Negative for headaches.   Psychiatric/Behavioral: Negative for confusion.     Objective:     Vital Signs (Most Recent):  Temp: 99.1 °F (37.3 °C) (06/20/22 1637)  Pulse: 83 (06/20/22 1637)  Resp: 16 (06/20/22 1637)  BP: (!) 167/65 (reported to April, RN) (06/20/22 1637)  SpO2: 98 % (06/20/22 1637) Vital Signs (24h Range):  Temp:  [97.9 °F (36.6 °C)-99.1 °F (37.3 °C)] 99.1 °F (37.3 °C)  Pulse:  [74-98] 83  Resp:  [4-38] 16  SpO2:  [96 %-100 %] 98 %  BP: (124-243)/() 167/65     Weight: 78.5 kg (173 lb)  Body mass index is 25.55 kg/m².    Physical Exam  Constitutional:       General: She is not in acute distress.  HENT:      Head: Normocephalic.      Right Ear: External ear normal.      Left Ear: External ear normal.   Eyes:      General:         Right eye: No discharge.         Left eye: No discharge.   Cardiovascular:      Rate and Rhythm: Normal rate.   Pulmonary:      Breath sounds: Normal breath sounds.   Abdominal:      Palpations: Abdomen is soft.   Musculoskeletal:         General: No swelling.      Cervical back: Neck supple.   Skin:     Findings: No rash.   Neurological:      Mental Status: She is oriented to person, place, and time.   Psychiatric:         Speech: Speech normal.         NEUROLOGICAL EXAMINATION:     MENTAL STATUS   Oriented to person, place, and time.   Speech: speech is normal   Level of consciousness: alert      Significant Labs:   CBC:   Recent Labs   Lab 06/19/22  1823 06/20/22  0525 06/21/22  0349   WBC 10.57 12.38 7.32   HGB 12.7 12.9 11.5*   HCT 38.5 39.3 36.9*     253 186     CMP:   Recent Labs   Lab 06/19/22  1823 06/19/22  2110 06/20/22  0135 06/20/22  0417 06/20/22  0525 06/21/22  0349   *   < > 295* 294*  --  261*   *   < > 136 137  --  138   K 4.6   < > 4.2 4.4  --  4.9   CL 95   < > 106 105  --  105   CO2 22*   < > 22* 23  --  25   BUN 23   < > 17 17  --  19   CREATININE 1.4   < > 0.9 1.0  --  1.2   CALCIUM 9.4   < > 8.3* 8.8  --  8.9   MG 1.6  --   --   --  1.6 1.5*   PROT 7.6  --   --   --   --  6.1   ALBUMIN 3.6  --   --   --   --  2.6*   BILITOT 0.5  --   --   --   --  0.2   ALKPHOS 146*  --   --   --   --  97   AST 26  --   --   --   --  31   ALT 31  --   --   --   --  27   ANIONGAP 11   < > 8 9  --  8   EGFRNONAA 38*   < > >60 57*  --  46*    < > = values in this interval not displayed.       Significant Imaging: I have reviewed all pertinent imaging results/findings within the past 24 hours.     Head CT  Impression:     1. No acute intracranial findings identified.  No etiology of the patient's symptoms is seen.  2. Additional details as per the body of report.        Electronically signed by: Rylan Way  Date:                                            06/20/2022  Time:                                           12:06    Assessment and Plan:     70 y/o female consulted for possible seizure    1. Seizure?: This could be a seizure given hyperglycemia and marked hypertension. Pt is now alert and fully oriented. No major motor or sensory deficits on exam.   MRI brain with no acute abnormalities.   -BP and BG control.   -Will monitor.    Active Diagnoses:    Diagnosis Date Noted POA    PRINCIPAL PROBLEM:  Hypertensive emergency [I16.1] 06/19/2022 Yes    Hypertensive encephalopathy [I67.4] 06/19/2022 Yes    Urinary tract infection without hematuria [N39.0] 06/19/2022 Yes    Status post tooth extraction [K08.409] 06/19/2022 Yes    Diastolic dysfunction with chronic heart failure [I50.32] 08/10/2021 Yes    Pulmonary hypertension -- echo  11/2019 [I27.20] 02/06/2020 Yes    Stage 3a chronic kidney disease [N18.31] 12/10/2019 Yes    Coronary artery disease of native artery of native heart with stable angina pectoris [I25.118] 03/29/2019 Yes    Uncontrolled type 2 diabetes mellitus with hyperosmolar nonketotic hyperglycemia [E11.00] 11/08/2016 Yes    Hyperlipidemia [E78.5] 07/30/2015 Yes    Follicular lymphoma [C82.90] 08/26/2014 Yes      Problems Resolved During this Admission:       VTE Risk Mitigation (From admission, onward)         Ordered     enoxaparin injection 40 mg  Daily         06/20/22 0735                Thank you for your consult. I will follow-up with patient. Please contact us if you have any additional questions.    Shaq Spicer MD  Neurology  [facility]

## 2022-06-20 NOTE — ASSESSMENT & PLAN NOTE
"March 29, 2019:  [facility] and PCI of RCA -  "Patient has serial mid 99% eccentric lesions consistent with plaque rupture site and abnormal EKG findings.... We initially pre-dilated with 3.0 balloon.  We placed a 3.0 by 12 mm resolute kenya stent in the midportion of the vessel.  We overlapped that with a 3.5 x 15 mm resolute kenya stent in the more proximal portion mid RCA.  Good result was achieved with MADINA 3 flow through the vessel.  Lad has a mid 90% stenosis and the left circumflex as well as the long 95% lesion as well.   Cardiac catheterization 01/08/2020:  1.  Successful PCI of proximal ramus with drug-eluting stent x1 (2.0 x 6 mm).  IVUS guidance was utilized for this PCI.  Post PCI IVUS demonstrated well-opposed and expanded stent.  MADINA 3 flow pre and post PCI.   2.  Successful PCI of circumflex with drug-eluting stent x1 (2.25 x 22 mm).  MADINA 3 flow pre and post PCI     Troponin negative today   Current therapy:    ticagrelor tablet 90 mg, 90 mg, Oral, BID     nitroGLYCERIN SL tablet 0.4 mg, 0.4 mg, Sublingual, Q5 Min PRN    atenoloL tablet 50 mg, 50 mg, Oral, BID     atorvastatin tablet 80 mg, 80 mg, Oral, QHS   "

## 2022-06-20 NOTE — H&P
"[facility]  History & Physical    Patient Name: Lorena Contreras  MRN: [id number]  Patient Class: IP- Inpatient  Admission Date: 6/19/2022  Attending Physician: Zoila Ying MD   Primary Care Provider: Jorge Paris MD         Patient information was obtained from patient, past medical records and ER records.     Subjective:     Principal Problem:Hypertensive emergency    Chief Complaint:   Chief Complaint   Patient presents with    Drooling     Pt daughter reporting episodes of drooling, and possible seizure like activity. Pt does not have a hx of of seizures. Daughter reporting pt able to talk during "seizure like " episodes. Pt Pt reporting slurred speech yesterday when she checked in . No weakness, no blurry vision. Pt reporting she is very thirsty and CBG was 600 at home today at 1230.         HPI: Ms. Contreras is a 72yo lady with a past medical history of Fe deficiency anemia, CAD, depression, DM2, follicular lymphoma, GERD, HTN, CVA, right mandibular molar extraction (about 1 month ago), and restless leg syndrome.    She was just seen in the ED yesterday with complaints of generalized weakness and, "glucose>500, feels "weak" for the last few days. also notes R maxillary dental pain, tooth extraction months ago at the site. The pt informs me that her glucose is chronically elevated despite treatment, sometimes in the 1000 range."  Dr. Perry noted, "Repeat glucose 498. Have written for additional insulin.  Glucose 369. Will write for 4 more units IV insulin and discharge her. She has an appt with her dentist next week. Will refer to endocrine to see about improved dm glycemic control."  She was discharged on Clindamycin 300mg po Q8 hours, Peridex 15 cc BID, and Clove oil.    She now returns to the ED with her sister due to worsening confusion and high glucose.  The patient is able to carry on a conversation with me and is semi-logical, but has puerile demeanor " "and is acting strangely.  She is laughing at odd times.  She is also confused as to why she is in the ED, and looks to the nurse to ask what is wrong with her.  Her sister is no longer in the ED.    She states she feels fine except for some problems speaking and swallowing, which she attributes to her recent dental issues (apparently a right lower molar was pulled, and a left lower molar had some pins placed and a root canal.  She denies terrance mouth pain, fever or chills.  She denies N/V/D.  She denies SOB or CP.  She is also having, "mouth quivering," problems speaking, problems swallowing (she failed her swallow study).    In the ED her VS's were BP (!) 243/175 -> see therapy below ->172/75   Pulse 91   Temp 99 °F (37.2 °C) (Rectal)   Resp 18   Wt 78.5 kg (173 lb)   SpO2 100%   Breastfeeding No   BMI 25.55 kg/m².  Labs showed normal CBC, INR 0.9.  CMP with Na 128, HCO3 22, Cr 1.4, Gluc 656, and normal LFT's, , trop 0.024, BHB 1.3, TSH 0.377.  VBG showed pH 7.35, PCO2 46. COVID yesterday was negative.    CT head showed no acute intracranial process, and changes of chronic small vessel ischemic disease and cerebral volume loss.  CXR showed no acute cardiopulmonary process identified. In the ED she was treated with NS 1L iv bolus.        Past Medical History:   Diagnosis Date    Allergy     Anemia     Anxiety     Arthritis     Cataract     both removed    Colon polyps     Coronary artery disease     Depression     Diabetes mellitus, type II     Disorder of kidney and ureter     Fibromyalgia     Follicular lymphoma     GERD (gastroesophageal reflux disease)     HTN (hypertension)     Hyperlipidemia     MI (myocardial infarction) 03/2019    Restless leg syndrome     Stroke        Past Surgical History:   Procedure Laterality Date    COLONOSCOPY  11/07/2012    Colon polyp found; repeat in 5 years    ELBOW SURGERY Right 2015    dislocation repair     ESOPHAGOGASTRODUODENOSCOPY  " 11/07/2012    atrophic gastritis, H pylori testing negative    INCISION AND DRAINAGE FOOT Right 6/2/2021    Procedure: INCISION AND DRAINAGE, FOOT, bone biopsy;  Surgeon: Quiana Penn DPM;  Location: [facility] OR;  Service: Podiatry;  Laterality: Right;    KNEE SURGERY Bilateral 2015    scoped    LEFT HEART CATHETERIZATION Left 3/29/2019    Procedure: Left heart cath;  Surgeon: Bladimir Barbosa MD;  Location: [facility] CATH LAB;  Service: Cardiology;  Laterality: Left;    LEFT HEART CATHETERIZATION Left 11/18/2019    Procedure: Left heart cath;  Surgeon: Karl Rico MD;  Location: [facility] CATH LAB;  Service: Cardiology;  Laterality: Left;    LEFT HEART CATHETERIZATION Left 1/8/2020    Procedure: Left heart cath, right radial, noon start;  Surgeon: Christos Monreal MD;  Location: [facility] CATH LAB;  Service: Cardiology;  Laterality: Left;  RN Pre Op 1-6-20.  To be admitted 1-7-20 sor Aspirin Disensitation    TONSILLECTOMY  1955    ULTRASOUND GUIDANCE  1/8/2020    Procedure: Ultrasound Guidance;  Surgeon: Christos Monreal MD;  Location: [facility] CATH LAB;  Service: Cardiology;;       Review of patient's allergies indicates:   Allergen Reactions    Novolin 70/30 (semi-synthetic) Nausea And Vomiting     Severe vomiting on Relion 70/30    Shellfish containing products Swelling    Sulfa (sulfonamide antibiotics) Anaphylaxis    Talwin [pentazocine lactate] Anaphylaxis    Victoza [liraglutide] Nausea And Vomiting    Glipizide Nausea Only    Citric acid     Codeine     Influenza virus vaccines Hives    Iodine and iodide containing products Hives    Rituxan [rituximab] Hives    Zoloft [sertraline] Nausea And Vomiting       No current facility-administered medications on file prior to encounter.     Current Outpatient Medications on File Prior to Encounter   Medication Sig    amLODIPine (NORVASC) 10 MG tablet TAKE 1 TABLET EVERY DAY    aspirin 81 MG Chew Take 1 tablet (81 mg total) by mouth once daily.    atenoloL  (TENORMIN) 50 MG tablet Take 1 tablet (50 mg total) by mouth 2 (two) times daily.    atorvastatin (LIPITOR) 80 MG tablet Take 1 tablet (80 mg total) by mouth every evening.    Bacillus coagulans (DIGESTIVE ADVANTAGE IMMUNE ORAL) Take by mouth.    chlorhexidine (PERIDEX) 0.12 % solution Use as directed 15 mLs in the mouth or throat 2 (two) times daily. for 14 days    cholecalciferol, vitamin D3, (VITAMIN D3) 50 mcg (2,000 unit) Cap Take 2 capsules by mouth 2 (two) times daily.    clindamycin (CLEOCIN) 150 MG capsule Take 2 capsules (300 mg total) by mouth every 8 (eight) hours. for 10 days    cyanocobalamin (VITAMIN B-12) 1000 MCG tablet Take 100 mcg by mouth once daily.    diclofenac sodium (VOLTAREN TOP) Apply topically.    ferrous sulfate 325 (65 FE) MG EC tablet Take 1 tablet (325 mg total) by mouth once daily.    FLUoxetine 40 MG capsule Take 1 capsule (40 mg total) by mouth once daily.    gabapentin (NEURONTIN) 300 MG capsule Take 2 capsules (600 mg total) by mouth 2 (two) times daily.    hydroCHLOROthiazide (HYDRODIURIL) 25 MG tablet Take 1 tablet (25 mg total) by mouth once daily.    insulin aspart protamine-insulin aspart (NOVOLOG MIX 70-30FLEXPEN U-100) 100 unit/mL (70-30) InPn pen Inject 30 Units into the skin 2 (two) times daily before meals. (Patient taking differently: Inject 30 Units into the skin 2 (two) times daily before meals. 35 units)    isosorbide mononitrate (IMDUR) 30 MG 24 hr tablet Take 1 tablet (30 mg total) by mouth once daily.    magnesium oxide (MAG-OX) 400 mg tablet Take 400 mg by mouth once daily.    meclizine (ANTIVERT) 25 mg tablet Take 1 tablet (25 mg total) by mouth 3 (three) times daily as needed for Dizziness.    melatonin 10 mg Cap Take by mouth.    metFORMIN (GLUCOPHAGE) 1000 MG tablet Take 1 tablet (1,000 mg total) by mouth 2 (two) times daily with meals.    multivitamin/iron/folic acid (CENTRUM COMPLETE ORAL) Take by mouth.    nitroGLYCERIN (NITROSTAT) 0.4  "MG SL tablet PLACE 1 TABLET UNDER THE TONGUE EVERY FIVE MINUTES AS NEEDED FOR CHEST PAIN AS DIRECTED    ondansetron (ZOFRAN-ODT) 4 MG TbDL Take 1 tablet (4 mg total) by mouth every 8 (eight) hours as needed.    pantoprazole (PROTONIX) 40 MG tablet Take 1 tablet (40 mg total) by mouth once daily.    ticagrelor (BRILINTA) 90 mg tablet Take 1 tablet (90 mg total) by mouth 2 (two) times daily.    vitamin E 1000 UNIT capsule Take 1,000 Units by mouth once daily.    acetaminophen (TYLENOL) 500 MG tablet Take 1 tablet (500 mg total) by mouth every 4 (four) hours as needed for Pain.    blood sugar diagnostic (FREESTYLE TEST) Strp 1 strip by Misc.(Non-Drug; Combo Route) route 4 (four) times daily.    EPINEPHrine (EPIPEN) 0.3 mg/0.3 mL AtIn Inject 0.3 mLs (0.3 mg total) into the muscle once. for 1 dose    flavoring agent, bulk, (CLOVE FLAVORING) Oil 1 Dose by Misc.(Non-Drug; Combo Route) route every 4 (four) hours as needed (dental pain).    HYDROcodone-acetaminophen (NORCO) 5-325 mg per tablet Take 1 tablet by mouth every 6 (six) hours as needed (severe pain only).    lancets 32 gauge Misc 1 lancet by Misc.(Non-Drug; Combo Route) route 4 (four) times daily.    pen needle, diabetic (BD ULTRA-FINE SAGAR PEN NEEDLE) 32 gauge x 5/32" Ndle USE WITH NOVOLOG MIX FLEXPENS    TRUEPLUS LANCETS 30 gauge Misc      Family History       Problem Relation (Age of Onset)    Allergy (severe) Daughter    Cancer Mother, Father    Diabetes Sister    Heart disease Mother    Hypertension Sister    Lung cancer Brother    No Known Problems Daughter          Tobacco Use    Smoking status: Never Smoker    Smokeless tobacco: Never Used   Substance and Sexual Activity    Alcohol use: Not Currently    Drug use: Never    Sexual activity: Not Currently     Partners: Male     Review of Systems   Constitutional:  Positive for activity change, appetite change and fatigue. Negative for fever.   HENT:  Positive for dental problem. Negative for " congestion.    Eyes:  Negative for photophobia.   Respiratory:  Negative for chest tightness and shortness of breath.    Cardiovascular:  Negative for chest pain.   Gastrointestinal:  Negative for diarrhea, nausea and vomiting.   Endocrine: Positive for cold intolerance.   Genitourinary:  Negative for dysuria.   Musculoskeletal:  Negative for myalgias.   Skin:  Negative for rash.   Allergic/Immunologic: Positive for immunocompromised state.   Neurological:  Negative for light-headedness and headaches.   Hematological:  Does not bruise/bleed easily.   Psychiatric/Behavioral:  Positive for behavioral problems, confusion and decreased concentration.    Objective:     Vital Signs (Most Recent):  Temp: 99 °F (37.2 °C) (06/19/22 1832)  Pulse: 91 (06/19/22 2107)  Resp: (!) 23 (06/19/22 2107)  BP: (!) 165/72 (06/19/22 2107)  SpO2: 99 % (06/19/22 2107)   Vital Signs (24h Range):  Temp:  [98.3 °F (36.8 °C)-99 °F (37.2 °C)] 99 °F (37.2 °C)  Pulse:  [88-98] 91  Resp:  [18-30] 23  SpO2:  [99 %-100 %] 99 %  BP: (162-243)/() 165/72     Weight: 78.5 kg (173 lb)  Body mass index is 25.55 kg/m².    Physical Exam  Constitutional:       General: She is not in acute distress.     Appearance: Normal appearance. She is not ill-appearing, toxic-appearing or diaphoretic.   HENT:      Head: Normocephalic and atraumatic. Right lower molar extraction clean appearing (see photo)  Eyes:      Conjunctiva/sclera:      Right eye: Right conjunctiva is not injected.      Left eye: Left conjunctiva is not injected.   Neck:      Thyroid: No thyromegaly.   Cardiovascular:      Rate and Rhythm: Normal rate and regular rhythm.      Heart sounds: Murmur heard.   Systolic murmur is present with a grade of 1/6.   Pulmonary:      Effort: Pulmonary effort is normal. No tachypnea or bradypnea.      Breath sounds: No decreased breath sounds, wheezing, rhonchi or rales.   Chest:      Chest wall: No swelling or tenderness.   Abdominal:      General: Abdomen  is flat. There is no distension.      Palpations: Abdomen is soft.      Tenderness: There is no abdominal tenderness.   Genitourinary:     Comments: No chong in place  Musculoskeletal:      Cervical back: Full passive range of motion without pain.   Lymphadenopathy:      Comments: No peripheral edema   Skin:     General: Skin is warm and dry.      Findings: No rash.   Neurological:      Mental Status: She is alert.   Psychiatric:         Attention and Perception: Attention and perception normal.         Mood and Affect: Affect is labile.         Speech: Speech is rapid and pressured and tangential.         Behavior: Behavior is cooperative.         Thought Content: Thought content is not paranoid.         Cognition and Memory: Cognition is impaired. Memory is impaired. She exhibits impaired recent memory and impaired remote memory.           Significant Labs:   Recent Results (from the past 24 hour(s))   POCT glucose    Collection Time: 06/19/22  5:47 PM   Result Value Ref Range    POCT Glucose >500 (H) 70 - 110 mg/dL   CBC auto differential    Collection Time: 06/19/22  6:23 PM   Result Value Ref Range    WBC 10.57 3.90 - 12.70 K/uL    RBC 4.51 4.00 - 5.40 M/uL    Hemoglobin 12.7 12.0 - 16.0 g/dL    Hematocrit 38.5 37.0 - 48.5 %    MCV 85 82 - 98 fL    MCH 28.2 27.0 - 31.0 pg    MCHC 33.0 32.0 - 36.0 g/dL    RDW 14.1 11.5 - 14.5 %    Platelets 205 150 - 450 K/uL    MPV 12.6 9.2 - 12.9 fL    Immature Granulocytes 0.4 0.0 - 0.5 %    Gran # (ANC) 7.6 1.8 - 7.7 K/uL    Immature Grans (Abs) 0.04 0.00 - 0.04 K/uL    Lymph # 2.0 1.0 - 4.8 K/uL    Mono # 0.6 0.3 - 1.0 K/uL    Eos # 0.1 0.0 - 0.5 K/uL    Baso # 0.11 0.00 - 0.20 K/uL    nRBC 0 0 /100 WBC    Gran % 72.1 38.0 - 73.0 %    Lymph % 19.3 18.0 - 48.0 %    Mono % 6.0 4.0 - 15.0 %    Eosinophil % 1.2 0.0 - 8.0 %    Basophil % 1.0 0.0 - 1.9 %    Differential Method Automated    Comprehensive metabolic panel    Collection Time: 06/19/22  6:23 PM   Result Value Ref  Range    Sodium 128 (L) 136 - 145 mmol/L    Potassium 4.6 3.5 - 5.1 mmol/L    Chloride 95 95 - 110 mmol/L    CO2 22 (L) 23 - 29 mmol/L    Glucose 656 (HH) 70 - 110 mg/dL    BUN 23 8 - 23 mg/dL    Creatinine 1.4 0.5 - 1.4 mg/dL    Calcium 9.4 8.7 - 10.5 mg/dL    Total Protein 7.6 6.0 - 8.4 g/dL    Albumin 3.6 3.5 - 5.2 g/dL    Total Bilirubin 0.5 0.1 - 1.0 mg/dL    Alkaline Phosphatase 146 (H) 55 - 135 U/L    AST 26 10 - 40 U/L    ALT 31 10 - 44 U/L    Anion Gap 11 8 - 16 mmol/L    eGFR if African American 44 (A) >60 mL/min/1.73 m^2    eGFR if non African American 38 (A) >60 mL/min/1.73 m^2   Beta - Hydroxybutyrate, Serum    Collection Time: 06/19/22  6:23 PM   Result Value Ref Range    Beta-Hydroxybutyrate 1.3 (H) 0.0 - 0.5 mmol/L   Troponin I    Collection Time: 06/19/22  6:23 PM   Result Value Ref Range    Troponin I 0.024 0.000 - 0.026 ng/mL   Brain natriuretic peptide    Collection Time: 06/19/22  6:23 PM   Result Value Ref Range     (H) 0 - 99 pg/mL   Magnesium    Collection Time: 06/19/22  6:23 PM   Result Value Ref Range    Magnesium 1.6 1.6 - 2.6 mg/dL   TSH    Collection Time: 06/19/22  6:23 PM   Result Value Ref Range    TSH 0.377 (L) 0.400 - 4.000 uIU/mL   Protime-INR    Collection Time: 06/19/22  6:23 PM   Result Value Ref Range    Prothrombin Time 10.0 9.0 - 12.5 sec    INR 0.9 0.8 - 1.2   Acetaminophen level    Collection Time: 06/19/22  6:23 PM   Result Value Ref Range    Acetaminophen (Tylenol), Serum <3.0 (L) 10.0 - 20.0 ug/mL   Salicylate level    Collection Time: 06/19/22  6:23 PM   Result Value Ref Range    Salicylate Lvl <5.0 (L) 15.0 - 30.0 mg/dL   T4, Free    Collection Time: 06/19/22  6:23 PM   Result Value Ref Range    Free T4 1.30 0.71 - 1.51 ng/dL   ISTAT PROCEDURE    Collection Time: 06/19/22  6:24 PM   Result Value Ref Range    POC PH 7.355 7.35 - 7.45    POC PCO2 46.4 (H) 35 - 45 mmHg    POC PO2 18 (L) 40 - 60 mmHg    POC HCO3 25.9 24 - 28 mmol/L    POC BE 0 -2 to 2 mmol/L    POC  SATURATED O2 24 (L) 95 - 100 %    POC TCO2 27 24 - 29 mmol/L    Sample VENOUS     Site Other     Allens Test N/A     DelSys Room Air     Mode SPONT    POCT glucose    Collection Time: 06/19/22  6:28 PM   Result Value Ref Range    POCT Glucose >500 (H) 70 - 110 mg/dL   Urinalysis, Reflex to Urine Culture Urine, Clean Catch    Collection Time: 06/19/22  6:53 PM    Specimen: Urine   Result Value Ref Range    Specimen UA Urine, Clean Catch     Color, UA Yellow Yellow, Straw, Sayra    Appearance, UA Clear Clear    pH, UA 6.0 5.0 - 8.0    Specific Gravity, UA 1.025 1.005 - 1.030    Protein, UA 1+ (A) Negative    Glucose, UA 4+ (A) Negative    Ketones, UA Trace (A) Negative    Bilirubin (UA) Negative Negative    Occult Blood UA Trace (A) Negative    Nitrite, UA Positive (A) Negative    Urobilinogen, UA Negative <2.0 EU/dL    Leukocytes, UA 1+ (A) Negative   Urinalysis Microscopic    Collection Time: 06/19/22  6:53 PM   Result Value Ref Range    RBC, UA 4 0 - 4 /hpf    WBC, UA 6 (H) 0 - 5 /hpf    Bacteria Many (A) None-Occ /hpf    Yeast, UA None None    Squam Epithel, UA 1 /hpf    Hyaline Casts, UA 0 0-1/lpf /lpf    Microscopic Comment SEE COMMENT    Ethanol    Collection Time: 06/19/22  7:46 PM   Result Value Ref Range    Alcohol, Serum <10 <10 mg/dL   Drug screen panel, emergency    Collection Time: 06/19/22  7:50 PM   Result Value Ref Range    Benzodiazepines Negative Negative    Methadone metabolites Negative Negative    Cocaine (Metab.) Negative Negative    Opiate Scrn, Ur Negative Negative    Barbiturate Screen, Ur Negative Negative    Amphetamine Screen, Ur Negative Negative    THC Negative Negative    Phencyclidine Negative Negative    Creatinine, Urine 17.0 15.0 - 325.0 mg/dL    Toxicology Information SEE COMMENT    POCT glucose    Collection Time: 06/19/22  8:27 PM   Result Value Ref Range    POCT Glucose >500 (H) 70 - 110 mg/dL   Lactic acid, plasma    Collection Time: 06/19/22  9:10 PM   Result Value Ref Range  "   Lactate (Lactic Acid) 2.6 (H) 0.5 - 2.2 mmol/L         Significant Imaging: I have reviewed all pertinent imaging results/findings within the past 24 hours.    Assessment/Plan:     * Hypertensive emergency  Goal MAP reduction of 20% for now  Holding home Norvasc  Check TTE in am (last 2019)    Current therapy:    niCARdipine 40 mg/200 mL (0.2 mg/mL) infusion, 0-15 mg/hr, Intravenous, Continuous    -Last Rate: 25 mL/hr at 06/19/22 1932, 5 mg/hr at 06/19/22 1932    atenoloL tablet 50 mg, 50 mg, Oral, BID     Hypertensive encephalopathy  CT head with no acute findings   MRI brain to rule out occult CVA vs PRES   -Problems speaking and swallowing   -May be dental, but not sure  Neuro checks q4  ICU admit  Neurology consult if not better by am      Uncontrolled type 2 diabetes mellitus with hyperosmolar nonketotic hyperglycemia  Home regime:    insulin aspart protamine-insulin aspart (NOVOLOG MIX 70-30FLEXPEN U-100) 30 Units SQ 2 BID AC    metFORMIN (GLUCOPHAGE) 1000 MG tablet, Take 1 tablet (1,000 mg total) by mouth 2 (two) times daily with meals     Current therapy:     gabapentin capsule 600 mg, 600 mg, Oral, BID     insulin regular 1 Units/mL in sodium chloride 0.9% 100 mL infusion, 0-52 Units/hr, Intravenous, Continuous    -Last Rate: 3 mL/hr at 06/19/22 2036, 3 Units/hr at 06/19/22 2036    Will convert to home regime when glucose <200                  Urinary tract infection without hematuria  Rocephin 1g iv q24 hours  Follow up UCxs  UA is only mildly positive, but treating given overall clinical picture      Status post tooth extraction  Risk of anaerobes post extraction is very low  Rocephin should cross cover for this if underlying residual infection  D/C on Augmentin to cover UTI + tooth infection if needed      Coronary artery disease of native artery of native heart with stable angina pectoris  March 29, 2019:  LHC and PCI of RCA -  "Patient has serial mid 99% eccentric lesions consistent with " plaque rupture site and abnormal EKG findings.... We initially pre-dilated with 3.0 balloon.  We placed a 3.0 by 12 mm resolute kenya stent in the midportion of the vessel.  We overlapped that with a 3.5 x 15 mm resolute kenya stent in the more proximal portion mid RCA.  Good result was achieved with MADINA 3 flow through the vessel.  Lad has a mid 90% stenosis and the left circumflex as well as the long 95% lesion as well.   Cardiac catheterization 01/08/2020:  1.  Successful PCI of proximal ramus with drug-eluting stent x1 (2.0 x 6 mm).  IVUS guidance was utilized for this PCI.  Post PCI IVUS demonstrated well-opposed and expanded stent.  MADINA 3 flow pre and post PCI.   2.  Successful PCI of circumflex with drug-eluting stent x1 (2.25 x 22 mm).  MADINA 3 flow pre and post PCI     Troponin negative today   Current therapy:    ticagrelor tablet 90 mg, 90 mg, Oral, BID     nitroGLYCERIN SL tablet 0.4 mg, 0.4 mg, Sublingual, Q5 Min PRN    atenoloL tablet 50 mg, 50 mg, Oral, BID     atorvastatin tablet 80 mg, 80 mg, Oral, QHS     Stage 3a chronic kidney disease  She has been trending up slightly  This is likely from uncontrolled HTN and HONK  Monitor daily  Renal dose all meds     Latest Reference Range & Units 02/11/22 11:00 05/26/22 10:40 06/18/22 12:37 06/19/22 18:23   Creatinine 0.5 - 1.4 mg/dL 1.0 1.4 1.7 (H) 1.4          Diastolic dysfunction with chronic heart failure  BNP is only 226  Care with fluids  Repeating TTE in am    11/18/19 Echo:  · Normal left ventricular systolic function. The estimated ejection fraction is 60%  · Concentric left ventricular hypertrophy.  · Grade II (moderate) left ventricular diastolic dysfunction consistent with pseudonormalization.  · Normal right ventricular systolic function.  · Moderate left atrial enlargement.  · Mild tricuspid regurgitation.  · The estimated PA systolic pressure is 50 mm Hg  · Pulmonary hypertension present.          Pulmonary hypertension -- echo  11/2019  PAP 50 on last echo  Rechecking in am    Hyperlipidemia associated with type 2 diabetes mellitus    atorvastatin tablet 80 mg, 80 mg, Oral, QHS     Follicular lymphoma  Follicular lymphoma diagnosed in late 2009. She was treated with chemotherapy (withour Rituximab due to allergy, unclear regimen) and apparently achieved a CR. She was then lost to follow up die to lack of insurance until 6/26/13 when she re-establish care in our clinic. Surveillance CTs from 7/9/13 are significant for unspecific some tissue prominence in the ventral tongue that is unspecific, unspecific pulmonary nodules and non bulky LAD in the internal jugular chain measuring up to 14 mm. These finding are unspecific and not indicative of follicular lymphoma recurrence.        VTE Risk Mitigation (From admission, onward)         Ordered     heparin (porcine) injection 5,000 Units  Every 8 hours (non-standard times)         06/19/22 2017     IP VTE HIGH RISK PATIENT  Once         06/19/22 2017     Place BORIS hose  Until discontinued         06/19/22 2017     Place sequential compression device  Until discontinued         06/19/22 2017               Critical care time spent on the evaluation and treatment of severe organ dysfunction, review of pertinent labs and imaging studies, discussions with consulting providers and discussions with patient/family: 70 minutes.    CARLOS Rubio MD  Department of Hospital Medicine   [facility] [place] - Emergency Dept

## 2022-06-20 NOTE — ED NOTES
Dr Rubio at bedside talk to patient and assessment done. Pt still not able to swallow  Dr Rubio said to keep pt NPO until cleared.

## 2022-06-20 NOTE — ASSESSMENT & PLAN NOTE
Home regime:    insulin aspart protamine-insulin aspart (NOVOLOG MIX 70-30FLEXPEN U-100) 30 Units SQ 2 BID AC    metFORMIN (GLUCOPHAGE) 1000 MG tablet, Take 1 tablet (1,000 mg total) by mouth 2 (two) times daily with meals     Current therapy:     gabapentin capsule 600 mg, 600 mg, Oral, BID     insulin regular 1 Units/mL in sodium chloride 0.9% 100 mL infusion, 0-52 Units/hr, Intravenous, Continuous    -Last Rate: 3 mL/hr at 06/19/22 2036, 3 Units/hr at 06/19/22 2036    Will convert to home regime when glucose <200

## 2022-06-20 NOTE — ASSESSMENT & PLAN NOTE
Rocephin 1g iv q24 hours  Follow up UCxs  UA is only mildly positive, but treating given overall clinical picture

## 2022-06-20 NOTE — ED NOTES
Pt alert and orient x 4 has periods of jaw quivering when she can not talk and last for few seconds. And she will fully recover and stat talking normal again . Pt admitted symptoms started yesterday and got wort today. Daughter at bedside pt was discharged last night from hospital.

## 2022-06-20 NOTE — ASSESSMENT & PLAN NOTE
Home regime:    insulin aspart protamine-insulin aspart (NOVOLOG MIX 70-30FLEXPEN U-100) 30 Units SQ 2 BID AC    metFORMIN (GLUCOPHAGE) 1000 MG tablet, Take 1 tablet (1,000 mg total) by mouth 2 (two) times daily with meals     Previously on insulin gtt  A1c:   Lab Results   Component Value Date    HGBA1C >14.0 (H) 05/26/2022     Meds: detemir + aspart insulin + SSI PRN to maintain goal 140-180  ADA diet, accuchecks, hypoglycemic protocol

## 2022-06-20 NOTE — ASSESSMENT & PLAN NOTE
She has been trending up slightly  This is likely from uncontrolled HTN and HONK  Monitor daily  Renal dose all meds     Latest Reference Range & Units 02/11/22 11:00 05/26/22 10:40 06/18/22 12:37 06/19/22 18:23   Creatinine 0.5 - 1.4 mg/dL 1.0 1.4 1.7 (H) 1.4

## 2022-06-20 NOTE — PT/OT/SLP EVAL
Speech Language Pathology Evaluation  Bedside Swallow    Patient Name:  Lorena Contreras   MRN:  [id number]  Admitting Diagnosis: Hypertensive emergency    Recommendations:                 General Recommendations:  Dysphagia therapy  Diet recommendations:  Soft & Bite Sized Diet - IDDSI Level 6, Thin   Aspiration Precautions: Alternating small bites/sips, Feed only when awake/alert, Frequent oral care, HOB to 90 degrees and Meds whole buried in puree   General Precautions: Standard    Communication strategies:  provide increased time to answer    History:     Past Medical History:   Diagnosis Date    Allergy     Altered mental status 06/19/2022    DYSARTHRIA, SPASTIC MOVEMENTS & DIFFICULTY SWALLOWING    Anemia     Anxiety     Arthritis     Cataract     both removed    Colon polyps     Coronary artery disease     Depression     Diabetes mellitus, type II     Disorder of kidney and ureter     Fibromyalgia     Follicular lymphoma     GERD (gastroesophageal reflux disease)     HTN (hypertension)     Hyperlipidemia     MI (myocardial infarction) 03/2019    Restless leg syndrome     Stroke        Past Surgical History:   Procedure Laterality Date    COLONOSCOPY  11/07/2012    Colon polyp found; repeat in 5 years    ELBOW SURGERY Right 2015    dislocation repair     ESOPHAGOGASTRODUODENOSCOPY  11/07/2012    atrophic gastritis, H pylori testing negative    INCISION AND DRAINAGE FOOT Right 6/2/2021    Procedure: INCISION AND DRAINAGE, FOOT, bone biopsy;  Surgeon: Quiana Penn DPM;  Location: [facility] OR;  Service: Podiatry;  Laterality: Right;    KNEE SURGERY Bilateral 2015    scoped    LEFT HEART CATHETERIZATION Left 3/29/2019    Procedure: Left heart cath;  Surgeon: Bladimir Barbosa MD;  Location: [facility] CATH LAB;  Service: Cardiology;  Laterality: Left;    LEFT HEART CATHETERIZATION Left 11/18/2019    Procedure: Left heart cath;  Surgeon: Karl Rico MD;  Location: [facility] CATH LAB;  Service:  Cardiology;  Laterality: Left;    LEFT HEART CATHETERIZATION Left 1/8/2020    Procedure: Left heart cath, right radial, noon start;  Surgeon: Christos Monreal MD;  Location: [facility] CATH LAB;  Service: Cardiology;  Laterality: Left;  RN Pre Op 1-6-20.  To be admitted 1-7-20 sor Aspirin Disensitation    TONSILLECTOMY  1955    ULTRASOUND GUIDANCE  1/8/2020    Procedure: Ultrasound Guidance;  Surgeon: Christos Monreal MD;  Location: [facility] CATH LAB;  Service: Cardiology;;       Chest X-Rays: Narrative & Impression  EXAMINATION:  XR CHEST AP PORTABLE     CLINICAL HISTORY:  altered mental status;     TECHNIQUE:  Single frontal view of the chest was performed.     COMPARISON:  September 2021.     FINDINGS:  Cardiac silhouette is normal in size.  Lungs are symmetrically expanded.  No evidence of focal consolidative process, pneumothorax, or significant pleural effusion.  No acute osseous abnormality identified.     Impression:     No acute cardiopulmonary process identified.        Electronically signed by: Leatha Palomo MD  Date:                                            06/19/2022  Time:                                           18:43    Prior diet: Regular consistency diet and thin liquids    Subjective     Pt awake/alert, oriented x4, answered ST questions appropriately, speech mildly distorted yet >80% intelligible.     Patient goals: To identify cause of intermittent speech/swallowing difficulty     Pain/Comfort:  · Pain Rating 1: 0/10    Respiratory Status: Room air    Objective:     Oral Musculature Evaluation  · Oral Musculature: WFL  · Dentition: teeth in poor condition, scattered dentition (teeth poor condition on upper left and right side of mouth)  · Secretion Management: adequate  · Mucosal Quality: adequate  · Mandibular Strength and Mobility: WFL  · Oral Labial Strength and Mobility: WFL  · Lingual Strength and Mobility: WFL  · Velar Elevation: WFL  · Volitional Cough: Adequate  · Volitional Swallow:  WFL  · Voice Prior to PO Intake: Clear    Bedside Swallow Eval:   Consistencies Assessed:  · Thin liquids - 3oz via straw  · Puree - x5tsp  · Soft solids - x3     Oral Phase:   · Excess chewing  · Prolonged mastication  · Impaired rotational chew 2/2 teeth on upper left/right side in poor condition/missing  · Slow oral transit time    Pharyngeal Phase:   · no overt clinical signs/symptoms of aspiration  · no overt clinical signs/symptoms of pharyngeal dysphagia    Compensatory Strategies  · None    Treatment: B/s swallow evaluation completed, multiple trials of PO dispensed to pt for identification of LRD/liquid level, also to determine her baseline LOF.     Assessment:     Lorena Contreras is a 71 y.o. female with an SLP diagnosis of mild oral dysphagia, however per pt she has been experiencing s/s of seizures resulting in impaired swallowing/speech function.  She presents with adequate swallowing for current LRD/liquid level. ST will follow for diet tolerance, and education for use of swallow precautions with PO intake.     Goals:   Multidisciplinary Problems     SLP Goals        Problem: SLP    Goal Priority Disciplines Outcome   SLP Goal     SLP Ongoing, Progressing   Description: 1. Pt will tolerate LRD/liquid level without overt s/s of aspiration.   2. Pt will tolerate dysphagia soft diet, IDDSI level 6 without overt s/s of aspiration.   3. Pt will demonstrate knowledge/use of 3/3 safe swallow strategies with PO trials.                    Plan:     · Patient to be seen:  3 x/week   · Plan of Care expires:     · Plan of Care reviewed with:  patient, daughter   · SLP Follow-Up:  Yes       Discharge recommendations:  other (see comments) (TBD)   Barriers to Discharge:  None    Time Tracking:     SLP Treatment Date:   06/20/22  Speech Start Time:  1204  Speech Stop Time:  1230     Speech Total Time (min):  26 min    Billable Minutes: Eval Swallow and Oral Function 10min and Self Care/Home Management Training  16min    06/20/2022

## 2022-06-20 NOTE — HPI
"Ms. Contreras is a 70yo lady with a past medical history of Fe deficiency anemia, CAD, depression, DM2, follicular lymphoma, GERD, HTN, CVA, right mandibular molar extraction (about 1 month ago), and restless leg syndrome.    She was just seen in the ED yesterday with complaints of generalized weakness and, "glucose>500, feels "weak" for the last few days. also notes R maxillary dental pain, tooth extraction months ago at the site. The pt informs me that her glucose is chronically elevated despite treatment, sometimes in the 1000 range."  Dr. Perry noted, "Repeat glucose 498. Have written for additional insulin.  Glucose 369. Will write for 4 more units IV insulin and discharge her. She has an appt with her dentist next week. Will refer to endocrine to see about improved dm glycemic control."  She was discharged on Clindamycin 300mg po Q8 hours, Peridex 15 cc BID, and Clove oil.    She now returns to the ED with her sister due to worsening confusion and high glucose.  The patient is able to carry on a conversation with me and is semi-logical, but has puerile demeanor and is acting strangely.  She is laughing at odd times.  She is also confused as to why she is in the ED, and looks to the nurse to ask what is wrong with her.  Her sister is no longer in the ED.    She states she feels fine except for some problems speaking and swallowing, which she attributes to her recent dental issues (apparently a right lower molar was pulled, and a left lower molar had some pins placed and a root canal.  She denies terrance mouth pain, fever or chills.  She denies N/V/D.  She denies SOB or CP.  She is also having, "mouth quivering," problems speaking, problems swallowing (she failed her swallow study).    In the ED her VS's were BP (!) 243/175 -> see therapy below ->172/75   Pulse 91   Temp 99 °F (37.2 °C) (Rectal)   Resp 18   Wt 78.5 kg (173 lb)   SpO2 100%   Breastfeeding No   BMI 25.55 kg/m².  Labs showed normal CBC, INR " 0.9.  CMP with Na 128, HCO3 22, Cr 1.4, Gluc 656, and normal LFT's, , trop 0.024, BHB 1.3, TSH 0.377.  VBG showed pH 7.35, PCO2 46. COVID yesterday was negative.    CT head showed no acute intracranial process, and changes of chronic small vessel ischemic disease and cerebral volume loss.  CXR showed no acute cardiopulmonary process identified. In the ED she was treated with NS 1L iv bolus.

## 2022-06-20 NOTE — SUBJECTIVE & OBJECTIVE
Past Medical History:   Diagnosis Date    Allergy     Anemia     Anxiety     Arthritis     Cataract     both removed    Colon polyps     Coronary artery disease     Depression     Diabetes mellitus, type II     Disorder of kidney and ureter     Fibromyalgia     Follicular lymphoma     GERD (gastroesophageal reflux disease)     HTN (hypertension)     Hyperlipidemia     MI (myocardial infarction) 03/2019    Restless leg syndrome     Stroke        Past Surgical History:   Procedure Laterality Date    COLONOSCOPY  11/07/2012    Colon polyp found; repeat in 5 years    ELBOW SURGERY Right 2015    dislocation repair     ESOPHAGOGASTRODUODENOSCOPY  11/07/2012    atrophic gastritis, H pylori testing negative    INCISION AND DRAINAGE FOOT Right 6/2/2021    Procedure: INCISION AND DRAINAGE, FOOT, bone biopsy;  Surgeon: Quiana Penn DPM;  Location: [facility] OR;  Service: Podiatry;  Laterality: Right;    KNEE SURGERY Bilateral 2015    scoped    LEFT HEART CATHETERIZATION Left 3/29/2019    Procedure: Left heart cath;  Surgeon: Bladimir Barbosa MD;  Location: [facility] CATH LAB;  Service: Cardiology;  Laterality: Left;    LEFT HEART CATHETERIZATION Left 11/18/2019    Procedure: Left heart cath;  Surgeon: Karl Rico MD;  Location: [facility] CATH LAB;  Service: Cardiology;  Laterality: Left;    LEFT HEART CATHETERIZATION Left 1/8/2020    Procedure: Left heart cath, right radial, noon start;  Surgeon: Christos Monreal MD;  Location: [facility] CATH LAB;  Service: Cardiology;  Laterality: Left;  RN Pre Op 1-6-20.  To be admitted 1-7-20 sor Aspirin Disensitation    TONSILLECTOMY  1955    ULTRASOUND GUIDANCE  1/8/2020    Procedure: Ultrasound Guidance;  Surgeon: Christos Monreal MD;  Location: [facility] CATH LAB;  Service: Cardiology;;       Review of patient's allergies indicates:   Allergen Reactions    Novolin 70/30 (semi-synthetic) Nausea And Vomiting     Severe vomiting on Relion 70/30    Shellfish containing products  Swelling    Sulfa (sulfonamide antibiotics) Anaphylaxis    Talwin [pentazocine lactate] Anaphylaxis    Victoza [liraglutide] Nausea And Vomiting    Glipizide Nausea Only    Citric acid     Codeine     Influenza virus vaccines Hives    Iodine and iodide containing products Hives    Rituxan [rituximab] Hives    Zoloft [sertraline] Nausea And Vomiting       No current facility-administered medications on file prior to encounter.     Current Outpatient Medications on File Prior to Encounter   Medication Sig    amLODIPine (NORVASC) 10 MG tablet TAKE 1 TABLET EVERY DAY    aspirin 81 MG Chew Take 1 tablet (81 mg total) by mouth once daily.    atenoloL (TENORMIN) 50 MG tablet Take 1 tablet (50 mg total) by mouth 2 (two) times daily.    atorvastatin (LIPITOR) 80 MG tablet Take 1 tablet (80 mg total) by mouth every evening.    Bacillus coagulans (DIGESTIVE ADVANTAGE IMMUNE ORAL) Take by mouth.    chlorhexidine (PERIDEX) 0.12 % solution Use as directed 15 mLs in the mouth or throat 2 (two) times daily. for 14 days    cholecalciferol, vitamin D3, (VITAMIN D3) 50 mcg (2,000 unit) Cap Take 2 capsules by mouth 2 (two) times daily.    clindamycin (CLEOCIN) 150 MG capsule Take 2 capsules (300 mg total) by mouth every 8 (eight) hours. for 10 days    cyanocobalamin (VITAMIN B-12) 1000 MCG tablet Take 100 mcg by mouth once daily.    diclofenac sodium (VOLTAREN TOP) Apply topically.    ferrous sulfate 325 (65 FE) MG EC tablet Take 1 tablet (325 mg total) by mouth once daily.    FLUoxetine 40 MG capsule Take 1 capsule (40 mg total) by mouth once daily.    gabapentin (NEURONTIN) 300 MG capsule Take 2 capsules (600 mg total) by mouth 2 (two) times daily.    hydroCHLOROthiazide (HYDRODIURIL) 25 MG tablet Take 1 tablet (25 mg total) by mouth once daily.    insulin aspart protamine-insulin aspart (NOVOLOG MIX 70-30FLEXPEN U-100) 100 unit/mL (70-30) InPn pen Inject 30 Units into the skin 2 (two) times daily before  "meals. (Patient taking differently: Inject 30 Units into the skin 2 (two) times daily before meals. 35 units)    isosorbide mononitrate (IMDUR) 30 MG 24 hr tablet Take 1 tablet (30 mg total) by mouth once daily.    magnesium oxide (MAG-OX) 400 mg tablet Take 400 mg by mouth once daily.    meclizine (ANTIVERT) 25 mg tablet Take 1 tablet (25 mg total) by mouth 3 (three) times daily as needed for Dizziness.    melatonin 10 mg Cap Take by mouth.    metFORMIN (GLUCOPHAGE) 1000 MG tablet Take 1 tablet (1,000 mg total) by mouth 2 (two) times daily with meals.    multivitamin/iron/folic acid (CENTRUM COMPLETE ORAL) Take by mouth.    nitroGLYCERIN (NITROSTAT) 0.4 MG SL tablet PLACE 1 TABLET UNDER THE TONGUE EVERY FIVE MINUTES AS NEEDED FOR CHEST PAIN AS DIRECTED    ondansetron (ZOFRAN-ODT) 4 MG TbDL Take 1 tablet (4 mg total) by mouth every 8 (eight) hours as needed.    pantoprazole (PROTONIX) 40 MG tablet Take 1 tablet (40 mg total) by mouth once daily.    ticagrelor (BRILINTA) 90 mg tablet Take 1 tablet (90 mg total) by mouth 2 (two) times daily.    vitamin E 1000 UNIT capsule Take 1,000 Units by mouth once daily.    acetaminophen (TYLENOL) 500 MG tablet Take 1 tablet (500 mg total) by mouth every 4 (four) hours as needed for Pain.    blood sugar diagnostic (FREESTYLE TEST) Strp 1 strip by Misc.(Non-Drug; Combo Route) route 4 (four) times daily.    EPINEPHrine (EPIPEN) 0.3 mg/0.3 mL AtIn Inject 0.3 mLs (0.3 mg total) into the muscle once. for 1 dose    flavoring agent, bulk, (CLOVE FLAVORING) Oil 1 Dose by Misc.(Non-Drug; Combo Route) route every 4 (four) hours as needed (dental pain).    HYDROcodone-acetaminophen (NORCO) 5-325 mg per tablet Take 1 tablet by mouth every 6 (six) hours as needed (severe pain only).    lancets 32 gauge Misc 1 lancet by Misc.(Non-Drug; Combo Route) route 4 (four) times daily.    pen needle, diabetic (BD ULTRA-FINE SAGAR PEN NEEDLE) 32 gauge x 5/32" Ndle USE WITH NOVOLOG MIX " FLEXPENS    TRUEPLUS LANCETS 30 gauge Misc      Family History       Problem Relation (Age of Onset)    Allergy (severe) Daughter    Cancer Mother, Father    Diabetes Sister    Heart disease Mother    Hypertension Sister    Lung cancer Brother    No Known Problems Daughter          Tobacco Use    Smoking status: Never Smoker    Smokeless tobacco: Never Used   Substance and Sexual Activity    Alcohol use: Not Currently    Drug use: Never    Sexual activity: Not Currently     Partners: Male     Review of Systems   Constitutional:  Positive for activity change, appetite change and fatigue. Negative for fever.   HENT:  Positive for dental problem. Negative for congestion.    Eyes:  Negative for photophobia.   Respiratory:  Negative for chest tightness and shortness of breath.    Cardiovascular:  Negative for chest pain.   Gastrointestinal:  Negative for diarrhea, nausea and vomiting.   Endocrine: Positive for cold intolerance.   Genitourinary:  Negative for dysuria.   Musculoskeletal:  Negative for myalgias.   Skin:  Negative for rash.   Allergic/Immunologic: Positive for immunocompromised state.   Neurological:  Negative for light-headedness and headaches.   Hematological:  Does not bruise/bleed easily.   Psychiatric/Behavioral:  Positive for behavioral problems, confusion and decreased concentration.    Objective:     Vital Signs (Most Recent):  Temp: 99 °F (37.2 °C) (06/19/22 1832)  Pulse: 91 (06/19/22 2107)  Resp: (!) 23 (06/19/22 2107)  BP: (!) 165/72 (06/19/22 2107)  SpO2: 99 % (06/19/22 2107)   Vital Signs (24h Range):  Temp:  [98.3 °F (36.8 °C)-99 °F (37.2 °C)] 99 °F (37.2 °C)  Pulse:  [88-98] 91  Resp:  [18-30] 23  SpO2:  [99 %-100 %] 99 %  BP: (162-243)/() 165/72     Weight: 78.5 kg (173 lb)  Body mass index is 25.55 kg/m².    Physical Exam  Constitutional:       General: She is not in acute distress.     Appearance: Normal appearance. She is not ill-appearing, toxic-appearing or diaphoretic.    HENT:      Head: Normocephalic and atraumatic.   Eyes:      Conjunctiva/sclera:      Right eye: Right conjunctiva is not injected.      Left eye: Left conjunctiva is not injected.   Neck:      Thyroid: No thyromegaly.   Cardiovascular:      Rate and Rhythm: Normal rate and regular rhythm.      Heart sounds: Murmur heard.   Systolic murmur is present with a grade of 1/6.   Pulmonary:      Effort: Pulmonary effort is normal. No tachypnea or bradypnea.      Breath sounds: No decreased breath sounds, wheezing, rhonchi or rales.   Chest:      Chest wall: No swelling or tenderness.   Abdominal:      General: Abdomen is flat. There is no distension.      Palpations: Abdomen is soft.      Tenderness: There is no abdominal tenderness.   Genitourinary:     Comments: No chong in place  Musculoskeletal:      Cervical back: Full passive range of motion without pain.   Lymphadenopathy:      Comments: No peripheral edema   Skin:     General: Skin is warm and dry.      Findings: No rash.   Neurological:      Mental Status: She is alert.   Psychiatric:         Attention and Perception: Attention and perception normal.         Mood and Affect: Affect is labile.         Speech: Speech is rapid and pressured and tangential.         Behavior: Behavior is cooperative.         Thought Content: Thought content is not paranoid.         Cognition and Memory: Cognition is impaired. Memory is impaired. She exhibits impaired recent memory and impaired remote memory.           Significant Labs:   Recent Results (from the past 24 hour(s))   POCT glucose    Collection Time: 06/19/22  5:47 PM   Result Value Ref Range    POCT Glucose >500 (H) 70 - 110 mg/dL   CBC auto differential    Collection Time: 06/19/22  6:23 PM   Result Value Ref Range    WBC 10.57 3.90 - 12.70 K/uL    RBC 4.51 4.00 - 5.40 M/uL    Hemoglobin 12.7 12.0 - 16.0 g/dL    Hematocrit 38.5 37.0 - 48.5 %    MCV 85 82 - 98 fL    MCH 28.2 27.0 - 31.0 pg    MCHC 33.0 32.0 - 36.0 g/dL     RDW 14.1 11.5 - 14.5 %    Platelets 205 150 - 450 K/uL    MPV 12.6 9.2 - 12.9 fL    Immature Granulocytes 0.4 0.0 - 0.5 %    Gran # (ANC) 7.6 1.8 - 7.7 K/uL    Immature Grans (Abs) 0.04 0.00 - 0.04 K/uL    Lymph # 2.0 1.0 - 4.8 K/uL    Mono # 0.6 0.3 - 1.0 K/uL    Eos # 0.1 0.0 - 0.5 K/uL    Baso # 0.11 0.00 - 0.20 K/uL    nRBC 0 0 /100 WBC    Gran % 72.1 38.0 - 73.0 %    Lymph % 19.3 18.0 - 48.0 %    Mono % 6.0 4.0 - 15.0 %    Eosinophil % 1.2 0.0 - 8.0 %    Basophil % 1.0 0.0 - 1.9 %    Differential Method Automated    Comprehensive metabolic panel    Collection Time: 06/19/22  6:23 PM   Result Value Ref Range    Sodium 128 (L) 136 - 145 mmol/L    Potassium 4.6 3.5 - 5.1 mmol/L    Chloride 95 95 - 110 mmol/L    CO2 22 (L) 23 - 29 mmol/L    Glucose 656 (HH) 70 - 110 mg/dL    BUN 23 8 - 23 mg/dL    Creatinine 1.4 0.5 - 1.4 mg/dL    Calcium 9.4 8.7 - 10.5 mg/dL    Total Protein 7.6 6.0 - 8.4 g/dL    Albumin 3.6 3.5 - 5.2 g/dL    Total Bilirubin 0.5 0.1 - 1.0 mg/dL    Alkaline Phosphatase 146 (H) 55 - 135 U/L    AST 26 10 - 40 U/L    ALT 31 10 - 44 U/L    Anion Gap 11 8 - 16 mmol/L    eGFR if African American 44 (A) >60 mL/min/1.73 m^2    eGFR if non African American 38 (A) >60 mL/min/1.73 m^2   Beta - Hydroxybutyrate, Serum    Collection Time: 06/19/22  6:23 PM   Result Value Ref Range    Beta-Hydroxybutyrate 1.3 (H) 0.0 - 0.5 mmol/L   Troponin I    Collection Time: 06/19/22  6:23 PM   Result Value Ref Range    Troponin I 0.024 0.000 - 0.026 ng/mL   Brain natriuretic peptide    Collection Time: 06/19/22  6:23 PM   Result Value Ref Range     (H) 0 - 99 pg/mL   Magnesium    Collection Time: 06/19/22  6:23 PM   Result Value Ref Range    Magnesium 1.6 1.6 - 2.6 mg/dL   TSH    Collection Time: 06/19/22  6:23 PM   Result Value Ref Range    TSH 0.377 (L) 0.400 - 4.000 uIU/mL   Protime-INR    Collection Time: 06/19/22  6:23 PM   Result Value Ref Range    Prothrombin Time 10.0 9.0 - 12.5 sec    INR 0.9 0.8 - 1.2    Acetaminophen level    Collection Time: 06/19/22  6:23 PM   Result Value Ref Range    Acetaminophen (Tylenol), Serum <3.0 (L) 10.0 - 20.0 ug/mL   Salicylate level    Collection Time: 06/19/22  6:23 PM   Result Value Ref Range    Salicylate Lvl <5.0 (L) 15.0 - 30.0 mg/dL   T4, Free    Collection Time: 06/19/22  6:23 PM   Result Value Ref Range    Free T4 1.30 0.71 - 1.51 ng/dL   ISTAT PROCEDURE    Collection Time: 06/19/22  6:24 PM   Result Value Ref Range    POC PH 7.355 7.35 - 7.45    POC PCO2 46.4 (H) 35 - 45 mmHg    POC PO2 18 (L) 40 - 60 mmHg    POC HCO3 25.9 24 - 28 mmol/L    POC BE 0 -2 to 2 mmol/L    POC SATURATED O2 24 (L) 95 - 100 %    POC TCO2 27 24 - 29 mmol/L    Sample VENOUS     Site Other     Allens Test N/A     DelSys Room Air     Mode SPONT    POCT glucose    Collection Time: 06/19/22  6:28 PM   Result Value Ref Range    POCT Glucose >500 (H) 70 - 110 mg/dL   Urinalysis, Reflex to Urine Culture Urine, Clean Catch    Collection Time: 06/19/22  6:53 PM    Specimen: Urine   Result Value Ref Range    Specimen UA Urine, Clean Catch     Color, UA Yellow Yellow, Straw, Sayra    Appearance, UA Clear Clear    pH, UA 6.0 5.0 - 8.0    Specific Gravity, UA 1.025 1.005 - 1.030    Protein, UA 1+ (A) Negative    Glucose, UA 4+ (A) Negative    Ketones, UA Trace (A) Negative    Bilirubin (UA) Negative Negative    Occult Blood UA Trace (A) Negative    Nitrite, UA Positive (A) Negative    Urobilinogen, UA Negative <2.0 EU/dL    Leukocytes, UA 1+ (A) Negative   Urinalysis Microscopic    Collection Time: 06/19/22  6:53 PM   Result Value Ref Range    RBC, UA 4 0 - 4 /hpf    WBC, UA 6 (H) 0 - 5 /hpf    Bacteria Many (A) None-Occ /hpf    Yeast, UA None None    Squam Epithel, UA 1 /hpf    Hyaline Casts, UA 0 0-1/lpf /lpf    Microscopic Comment SEE COMMENT    Ethanol    Collection Time: 06/19/22  7:46 PM   Result Value Ref Range    Alcohol, Serum <10 <10 mg/dL   Drug screen panel, emergency    Collection Time: 06/19/22   7:50 PM   Result Value Ref Range    Benzodiazepines Negative Negative    Methadone metabolites Negative Negative    Cocaine (Metab.) Negative Negative    Opiate Scrn, Ur Negative Negative    Barbiturate Screen, Ur Negative Negative    Amphetamine Screen, Ur Negative Negative    THC Negative Negative    Phencyclidine Negative Negative    Creatinine, Urine 17.0 15.0 - 325.0 mg/dL    Toxicology Information SEE COMMENT    POCT glucose    Collection Time: 06/19/22  8:27 PM   Result Value Ref Range    POCT Glucose >500 (H) 70 - 110 mg/dL   Lactic acid, plasma    Collection Time: 06/19/22  9:10 PM   Result Value Ref Range    Lactate (Lactic Acid) 2.6 (H) 0.5 - 2.2 mmol/L         Significant Imaging: I have reviewed all pertinent imaging results/findings within the past 24 hours.

## 2022-06-20 NOTE — HOSPITAL COURSE
Ms Lorena Contreras was admitted with DKA and HTN emergency after missing meds for 1 week. These both resolved with insulin gtt, IVF, and cardene gtt then starting home medications. There was initial concern for UTI but urine culture is no growth. She has had episodes of facial twitching starting 6/19 which are new and now decreasing in frequency. She is alert during them and not confused after. These episodes are reproducible when she touches her teeth. Neurology consulted. Doubt seizures. May be associated with dental issue. She is stable for discharge to home. Follow up with PCP. Follow up with dentist.

## 2022-06-20 NOTE — PHARMACY MED REC
"  Admission Medication History     The home medication history was taken by Sabine Mcqueen.    You may go to "Admission" then "Reconcile Home Medications" tabs to review and/or act upon these items.      The home medication list has been updated by the Pharmacy department.    Please read ALL comments highlighted in yellow.    Please address this information as you see fit.     Feel free to contact us if you have any questions or require assistance.      The medications listed below were removed from the home medication list. Please reorder if appropriate:  Patient reports no longer taking the following medication(s):   Norco    Medications listed below were obtained from: Patient/family and Analytic software- [employer] and added to home medication:   Vitamin E   Vitamin B-12   Melatonin   Voltaren gel   Centrum    Digestive Advantage    The medication reconciliation was completed at the patient's bedside.    Sabine Mcqueen  [phone]                          .          "

## 2022-06-20 NOTE — PHARMACY MED REC
"  Admission Medication History     The home medication history was taken by Laura Da Silva CPhT.    You may go to "Admission" then "Reconcile Home Medications" tabs to review and/or act upon these items.      The home medication list has been updated by the Pharmacy department.    Please read ALL comments highlighted in yellow.    Please address this information as you see fit.     Feel free to contact us if you have any questions or require assistance.      The medications listed below were removed from the home medication list. Please reorder if appropriate:  Patient reports no longer taking the following medication(s):   Norco 5/325   Zofran 4 mg      Medications listed below were obtained from: Patient/family and Analytic software- [employer]  (Not in a hospital admission)        Laura Da Silva CPhT.  [phone]              .          "

## 2022-06-20 NOTE — CARE UPDATE
Glucose has improved significantly into the 200 range  I have stopped the insulin infusion  Mg and Phos slightly low - replacing   cc/hr  She failed her nursing bedside swallow in the ED   -SLP swallow evaluation + MRI brain  Below insulin regime is based off home converson of 70/30, 30U SQ BID (60U total)    Current Facility-Administered Medications:     0.9%  NaCl infusion, , Intravenous, Continuous, 100 cc/hr     insulin aspart U-100 pen 1-10 Units, 1-10 Units, Subcutaneous, QID (AC + HS) PRN, mod dose    insulin aspart U-100 pen 6 Units, 6 Units, Subcutaneous, TIDWM     insulin detemir U-100 pen 25 Units, 25 Units, Subcutaneous, Daily     magnesium sulfate in dextrose IVPB (premix) 1 g, 1 g, Intravenous, Once     sodium phosphate 15 mmol in dextrose 5 % 250 mL IVPB, 15 mmol, Intravenous, Once      Recent Results (from the past 4 hour(s))   Basic metabolic panel    Collection Time: 06/20/22  4:17 AM   Result Value Ref Range    Sodium 137 136 - 145 mmol/L    Potassium 4.4 3.5 - 5.1 mmol/L    Chloride 105 95 - 110 mmol/L    CO2 23 23 - 29 mmol/L    Glucose 294 (H) 70 - 110 mg/dL    BUN 17 8 - 23 mg/dL    Creatinine 1.0 0.5 - 1.4 mg/dL    Calcium 8.8 8.7 - 10.5 mg/dL    Anion Gap 9 8 - 16 mmol/L    eGFR if African American >60 >60 mL/min/1.73 m^2    eGFR if non African American 57 (A) >60 mL/min/1.73 m^2   POCT glucose    Collection Time: 06/20/22  4:32 AM   Result Value Ref Range    POCT Glucose 279 (H) 70 - 110 mg/dL   Phosphorus    Collection Time: 06/20/22  5:25 AM   Result Value Ref Range    Phosphorus 2.5 (L) 2.7 - 4.5 mg/dL   CBC auto differential    Collection Time: 06/20/22  5:25 AM   Result Value Ref Range    WBC 12.38 3.90 - 12.70 K/uL    RBC 4.64 4.00 - 5.40 M/uL    Hemoglobin 12.9 12.0 - 16.0 g/dL    Hematocrit 39.3 37.0 - 48.5 %    MCV 85 82 - 98 fL    MCH 27.8 27.0 - 31.0 pg    MCHC 32.8 32.0 - 36.0 g/dL    RDW 14.2 11.5 - 14.5 %    Platelets 253 150 - 450 K/uL    MPV 12.8 9.2 - 12.9 fL     Immature Granulocytes 0.5 0.0 - 0.5 %    Gran # (ANC) 6.8 1.8 - 7.7 K/uL    Immature Grans (Abs) 0.06 (H) 0.00 - 0.04 K/uL    Lymph # 4.2 1.0 - 4.8 K/uL    Mono # 0.9 0.3 - 1.0 K/uL    Eos # 0.3 0.0 - 0.5 K/uL    Baso # 0.14 0.00 - 0.20 K/uL    nRBC 0 0 /100 WBC    Gran % 55.2 38.0 - 73.0 %    Lymph % 33.8 18.0 - 48.0 %    Mono % 7.2 4.0 - 15.0 %    Eosinophil % 2.2 0.0 - 8.0 %    Basophil % 1.1 0.0 - 1.9 %    Differential Method Automated    Magnesium    Collection Time: 06/20/22  5:25 AM   Result Value Ref Range    Magnesium 1.6 1.6 - 2.6 mg/dL   POCT glucose    Collection Time: 06/20/22  5:37 AM   Result Value Ref Range    POCT Glucose 268 (H) 70 - 110 mg/dL   POCT glucose    Collection Time: 06/20/22  6:37 AM   Result Value Ref Range    POCT Glucose 277 (H) 70 - 110 mg/dL

## 2022-06-20 NOTE — NURSING
New admit from ED to room 323 welcomed to the unit at 1625. Patient arrived via stretcher and transferred to bed with assistance. Oriented patient to room and call bell system. Call bell within reach.  Discussed plan of care and safety with pt. Patient verbalizes understanding of information received. Pt placed on telemetry. Patient denies any needs at this time.

## 2022-06-20 NOTE — ASSESSMENT & PLAN NOTE
Risk of anaerobes post extraction is very low  Rocephin should cross cover for this if underlying residual infection  D/C on Augmentin to cover UTI + tooth infection if needed

## 2022-06-20 NOTE — ASSESSMENT & PLAN NOTE
She has been trending up slightly  This is likely from uncontrolled HTN and HONK  Monitor daily  Cr back to 1  Resume home HCTZ

## 2022-06-20 NOTE — ASSESSMENT & PLAN NOTE
Follicular lymphoma diagnosed in late 2009. She was treated with chemotherapy (withour Rituximab due to allergy, unclear regimen) and apparently achieved a CR. She was then lost to follow up die to lack of insurance until 6/26/13 when she re-establish care in our clinic. Surveillance CTs from 7/9/13 are significant for unspecific some tissue prominence in the ventral tongue that is unspecific, unspecific pulmonary nodules and non bulky LAD in the internal jugular chain measuring up to 14 mm. These finding are unspecific and not indicative of follicular lymphoma recurrence.

## 2022-06-20 NOTE — PLAN OF CARE
Problem: SLP  Goal: SLP Goal  Description: 1. Pt will tolerate LRD/liquid level without overt s/s of aspiration.   2. Pt will tolerate dysphagia soft diet, IDDSI level 6 without overt s/s of aspiration.   3. Pt will demonstrate knowledge/use of 3/3 safe swallow strategies with PO trials.   Outcome: Ongoing, Progressing     B/s swallow evaluation completed. ST will follow for diet tolerance and education regarding swallow precautions.

## 2022-06-20 NOTE — SUBJECTIVE & OBJECTIVE
Interval History: Feeling much better this morning. Facial twitching episodes have decreased in frequency. She is hungry. Denies pain. Did have urinary burning prior to admit.     Review of Systems   Constitutional:  Negative for chills and fever.   Respiratory:  Negative for cough and shortness of breath.    Cardiovascular:  Negative for chest pain and leg swelling.   Gastrointestinal:  Negative for abdominal pain, constipation, diarrhea, nausea and vomiting.   Genitourinary:  Positive for dysuria.   Musculoskeletal:  Negative for arthralgias and myalgias.   Neurological:  Negative for weakness, numbness and headaches.   Psychiatric/Behavioral:  Negative for confusion.    Objective:     Vital Signs (Most Recent):  Temp: 97.9 °F (36.6 °C) (06/20/22 0118)  Pulse: 74 (06/20/22 1347)  Resp: (!) 31 (06/20/22 1047)  BP: (!) 162/72 (06/20/22 1147)  SpO2: 96 % (06/20/22 1347)   Vital Signs (24h Range):  Temp:  [97.9 °F (36.6 °C)-99 °F (37.2 °C)] 97.9 °F (36.6 °C)  Pulse:  [74-98] 74  Resp:  [4-38] 31  SpO2:  [96 %-100 %] 96 %  BP: (124-243)/() 162/72     Weight: 78.5 kg (173 lb)  Body mass index is 25.55 kg/m².    Intake/Output Summary (Last 24 hours) at 6/20/2022 1506  Last data filed at 6/20/2022 0228  Gross per 24 hour   Intake 1697.85 ml   Output --   Net 1697.85 ml      Physical Exam  Vitals and nursing note reviewed.   Constitutional:       General: She is not in acute distress.     Appearance: She is not toxic-appearing.   HENT:      Head: Normocephalic and atraumatic.      Nose: Nose normal.      Mouth/Throat:      Mouth: Mucous membranes are moist.      Comments: Poor dentition  Eyes:      General: No scleral icterus.     Conjunctiva/sclera: Conjunctivae normal.   Cardiovascular:      Rate and Rhythm: Normal rate and regular rhythm.      Pulses: Normal pulses.      Heart sounds: Normal heart sounds. No murmur heard.    No gallop.   Pulmonary:      Effort: Pulmonary effort is normal. No respiratory distress.       Breath sounds: Normal breath sounds. No wheezing or rales.      Comments: Room air  Abdominal:      General: Bowel sounds are normal. There is no distension.      Palpations: Abdomen is soft.      Tenderness: There is no abdominal tenderness. There is no guarding.   Genitourinary:     Comments: Purewick in place  Musculoskeletal:      Right lower leg: No edema.      Left lower leg: No edema.   Skin:     General: Skin is warm and dry.   Neurological:      Mental Status: She is alert.       Significant Labs: All pertinent labs within the past 24 hours have been reviewed.    Significant Imaging: I have reviewed all pertinent imaging results/findings within the past 24 hours.

## 2022-06-21 VITALS
TEMPERATURE: 98 F | HEIGHT: 69 IN | SYSTOLIC BLOOD PRESSURE: 143 MMHG | HEART RATE: 81 BPM | BODY MASS INDEX: 26.02 KG/M2 | RESPIRATION RATE: 19 BRPM | WEIGHT: 175.69 LBS | OXYGEN SATURATION: 98 % | DIASTOLIC BLOOD PRESSURE: 65 MMHG

## 2022-06-21 LAB
ALBUMIN SERPL BCP-MCNC: 2.6 G/DL (ref 3.5–5.2)
ALP SERPL-CCNC: 97 U/L (ref 55–135)
ALT SERPL W/O P-5'-P-CCNC: 27 U/L (ref 10–44)
ANION GAP SERPL CALC-SCNC: 8 MMOL/L (ref 8–16)
AST SERPL-CCNC: 31 U/L (ref 10–40)
BASOPHILS # BLD AUTO: 0.07 K/UL (ref 0–0.2)
BASOPHILS NFR BLD: 1 % (ref 0–1.9)
BILIRUB SERPL-MCNC: 0.2 MG/DL (ref 0.1–1)
BUN SERPL-MCNC: 19 MG/DL (ref 8–23)
CALCIUM SERPL-MCNC: 8.9 MG/DL (ref 8.7–10.5)
CHLORIDE SERPL-SCNC: 105 MMOL/L (ref 95–110)
CO2 SERPL-SCNC: 25 MMOL/L (ref 23–29)
CREAT SERPL-MCNC: 1.2 MG/DL (ref 0.5–1.4)
DIFFERENTIAL METHOD: ABNORMAL
EOSINOPHIL # BLD AUTO: 0.2 K/UL (ref 0–0.5)
EOSINOPHIL NFR BLD: 2.7 % (ref 0–8)
ERYTHROCYTE [DISTWIDTH] IN BLOOD BY AUTOMATED COUNT: 14.3 % (ref 11.5–14.5)
EST. GFR  (AFRICAN AMERICAN): 53 ML/MIN/1.73 M^2
EST. GFR  (NON AFRICAN AMERICAN): 46 ML/MIN/1.73 M^2
GLUCOSE SERPL-MCNC: 261 MG/DL (ref 70–110)
HCT VFR BLD AUTO: 36.9 % (ref 37–48.5)
HGB BLD-MCNC: 11.5 G/DL (ref 12–16)
IMM GRANULOCYTES # BLD AUTO: 0.04 K/UL (ref 0–0.04)
IMM GRANULOCYTES NFR BLD AUTO: 0.5 % (ref 0–0.5)
LYMPHOCYTES # BLD AUTO: 2.4 K/UL (ref 1–4.8)
LYMPHOCYTES NFR BLD: 32.1 % (ref 18–48)
MAGNESIUM SERPL-MCNC: 1.5 MG/DL (ref 1.6–2.6)
MCH RBC QN AUTO: 26.9 PG (ref 27–31)
MCHC RBC AUTO-ENTMCNC: 31.2 G/DL (ref 32–36)
MCV RBC AUTO: 86 FL (ref 82–98)
MONOCYTES # BLD AUTO: 0.5 K/UL (ref 0.3–1)
MONOCYTES NFR BLD: 7.4 % (ref 4–15)
NEUTROPHILS # BLD AUTO: 4.1 K/UL (ref 1.8–7.7)
NEUTROPHILS NFR BLD: 56.3 % (ref 38–73)
NRBC BLD-RTO: 0 /100 WBC
PHOSPHATE SERPL-MCNC: 3.7 MG/DL (ref 2.7–4.5)
PLATELET # BLD AUTO: 186 K/UL (ref 150–450)
PMV BLD AUTO: 13 FL (ref 9.2–12.9)
POCT GLUCOSE: 252 MG/DL (ref 70–110)
POCT GLUCOSE: 314 MG/DL (ref 70–110)
POTASSIUM SERPL-SCNC: 4.9 MMOL/L (ref 3.5–5.1)
PROT SERPL-MCNC: 6.1 G/DL (ref 6–8.4)
RBC # BLD AUTO: 4.28 M/UL (ref 4–5.4)
SODIUM SERPL-SCNC: 138 MMOL/L (ref 136–145)
WBC # BLD AUTO: 7.32 K/UL (ref 3.9–12.7)

## 2022-06-21 PROCEDURE — 25000003 PHARM REV CODE 250: Performed by: INTERNAL MEDICINE

## 2022-06-21 PROCEDURE — 99232 SBSQ HOSP IP/OBS MODERATE 35: CPT | Mod: ,,, | Performed by: PSYCHIATRY & NEUROLOGY

## 2022-06-21 PROCEDURE — 99232 PR SUBSEQUENT HOSPITAL CARE,LEVL II: ICD-10-PCS | Mod: ,,, | Performed by: PSYCHIATRY & NEUROLOGY

## 2022-06-21 PROCEDURE — C9399 UNCLASSIFIED DRUGS OR BIOLOG: HCPCS | Performed by: HOSPITALIST

## 2022-06-21 PROCEDURE — 84100 ASSAY OF PHOSPHORUS: CPT | Performed by: INTERNAL MEDICINE

## 2022-06-21 PROCEDURE — 80053 COMPREHEN METABOLIC PANEL: CPT | Performed by: INTERNAL MEDICINE

## 2022-06-21 PROCEDURE — 85025 COMPLETE CBC W/AUTO DIFF WBC: CPT | Performed by: INTERNAL MEDICINE

## 2022-06-21 PROCEDURE — 25000003 PHARM REV CODE 250: Performed by: HOSPITALIST

## 2022-06-21 PROCEDURE — 36415 COLL VENOUS BLD VENIPUNCTURE: CPT | Performed by: INTERNAL MEDICINE

## 2022-06-21 PROCEDURE — 63600175 PHARM REV CODE 636 W HCPCS: Performed by: HOSPITALIST

## 2022-06-21 PROCEDURE — 83735 ASSAY OF MAGNESIUM: CPT | Performed by: INTERNAL MEDICINE

## 2022-06-21 PROCEDURE — 92526 ORAL FUNCTION THERAPY: CPT

## 2022-06-21 RX ORDER — INSULIN ASPART 100 [IU]/ML
15 INJECTION, SOLUTION INTRAVENOUS; SUBCUTANEOUS
Status: DISCONTINUED | OUTPATIENT
Start: 2022-06-21 | End: 2022-06-21 | Stop reason: HOSPADM

## 2022-06-21 RX ADMIN — INSULIN ASPART 8 UNITS: 100 INJECTION, SOLUTION INTRAVENOUS; SUBCUTANEOUS at 07:06

## 2022-06-21 RX ADMIN — ISOSORBIDE MONONITRATE 30 MG: 30 TABLET, EXTENDED RELEASE ORAL at 08:06

## 2022-06-21 RX ADMIN — ATENOLOL 50 MG: 25 TABLET ORAL at 08:06

## 2022-06-21 RX ADMIN — INSULIN DETEMIR 30 UNITS: 100 INJECTION, SOLUTION SUBCUTANEOUS at 08:06

## 2022-06-21 RX ADMIN — FERROUS SULFATE TAB 325 MG (65 MG ELEMENTAL FE) 1 EACH: 325 (65 FE) TAB at 08:06

## 2022-06-21 RX ADMIN — CHOLECALCIFEROL TAB 25 MCG (1000 UNIT) 4000 UNITS: 25 TAB at 08:06

## 2022-06-21 RX ADMIN — ASPIRIN 81 MG CHEWABLE TABLET 81 MG: 81 TABLET CHEWABLE at 08:06

## 2022-06-21 RX ADMIN — FLUOXETINE 40 MG: 20 CAPSULE ORAL at 08:06

## 2022-06-21 RX ADMIN — AMLODIPINE BESYLATE 10 MG: 5 TABLET ORAL at 08:06

## 2022-06-21 RX ADMIN — GABAPENTIN 600 MG: 300 CAPSULE ORAL at 08:06

## 2022-06-21 RX ADMIN — INSULIN ASPART 6 UNITS: 100 INJECTION, SOLUTION INTRAVENOUS; SUBCUTANEOUS at 11:06

## 2022-06-21 RX ADMIN — HYDROCHLOROTHIAZIDE 25 MG: 25 TABLET ORAL at 08:06

## 2022-06-21 RX ADMIN — INSULIN ASPART 15 UNITS: 100 INJECTION, SOLUTION INTRAVENOUS; SUBCUTANEOUS at 11:06

## 2022-06-21 RX ADMIN — TICAGRELOR 90 MG: 90 TABLET ORAL at 08:06

## 2022-06-21 RX ADMIN — INSULIN ASPART 10 UNITS: 100 INJECTION, SOLUTION INTRAVENOUS; SUBCUTANEOUS at 07:06

## 2022-06-21 RX ADMIN — PANTOPRAZOLE SODIUM 40 MG: 40 TABLET, DELAYED RELEASE ORAL at 08:06

## 2022-06-21 NOTE — PLAN OF CARE
06/21/22 1333   Discharge Planning   Assessment Type Discharge Planning Brief Assessment   Resource/Environmental Concerns none   Support Systems Family members   Equipment Currently Used at Home glucometer   Current Living Arrangements home/apartment/condo   Patient/Family Anticipates Transition to home with family   Patient/Family Anticipated Services at Transition none   DME Needed Upon Discharge  none   Discharge Plan A Home with family     [facility] [place] - [address]  [address]  [place] [address]  Phone: [phone] Fax: [phone]

## 2022-06-21 NOTE — PLAN OF CARE
06/21/22 1350   Final Note   Assessment Type Final Discharge Note   Anticipated Discharge Disposition Home   Hospital Resources/Appts/Education Provided Appointments scheduled and added to AVS   Post-Acute Status   Post-Acute Authorization Other;HME   HME Status   (Patient does not qualify for BSC)   Other Status No Post-Acute Service Needs   Discharge Delays None known at this time   George Rutledge notified that all CM needs are met

## 2022-06-21 NOTE — PROGRESS NOTES
CM video called patient's room to discuss how she will manage her care at home.  ST at bedside.  CM to follow up at a later time.

## 2022-06-21 NOTE — DISCHARGE SUMMARY
"[facility] Medicine  Discharge Summary      Patient Name: Lorena Contreras  MRN: [id number]  Patient Class: IP- Inpatient  Admission Date: 6/19/2022  Hospital Length of Stay: 2 days  Discharge Date and Time:  06/21/2022 10:17 AM  Attending Physician: Zoila Ying MD   Discharging Provider: Zoila Ying MD  Primary Care Provider: Jorge Paris MD      HPI:   Ms. Contreras is a 72yo lady with a past medical history of Fe deficiency anemia, CAD, depression, DM2, follicular lymphoma, GERD, HTN, CVA, right mandibular molar extraction (about 1 month ago), and restless leg syndrome.    She was just seen in the ED yesterday with complaints of generalized weakness and, "glucose>500, feels "weak" for the last few days. also notes R maxillary dental pain, tooth extraction months ago at the site. The pt informs me that her glucose is chronically elevated despite treatment, sometimes in the 1000 range."  Dr. Perry noted, "Repeat glucose 498. Have written for additional insulin.  Glucose 369. Will write for 4 more units IV insulin and discharge her. She has an appt with her dentist next week. Will refer to endocrine to see about improved dm glycemic control."  She was discharged on Clindamycin 300mg po Q8 hours, Peridex 15 cc BID, and Clove oil.    She now returns to the ED with her sister due to worsening confusion and high glucose.  The patient is able to carry on a conversation with me and is semi-logical, but has puerile demeanor and is acting strangely.  She is laughing at odd times.  She is also confused as to why she is in the ED, and looks to the nurse to ask what is wrong with her.  Her sister is no longer in the ED.    She states she feels fine except for some problems speaking and swallowing, which she attributes to her recent dental issues (apparently a right lower molar was pulled, and a left lower molar had some pins placed and a root canal.  She denies terrance mouth pain, fever " "or chills.  She denies N/V/D.  She denies SOB or CP.  She is also having, "mouth quivering," problems speaking, problems swallowing (she failed her swallow study).    In the ED her VS's were BP (!) 243/175 -> see therapy below ->172/75   Pulse 91   Temp 99 °F (37.2 °C) (Rectal)   Resp 18   Wt 78.5 kg (173 lb)   SpO2 100%   Breastfeeding No   BMI 25.55 kg/m².  Labs showed normal CBC, INR 0.9.  CMP with Na 128, HCO3 22, Cr 1.4, Gluc 656, and normal LFT's, , trop 0.024, BHB 1.3, TSH 0.377.  VBG showed pH 7.35, PCO2 46. COVID yesterday was negative.    CT head showed no acute intracranial process, and changes of chronic small vessel ischemic disease and cerebral volume loss.  CXR showed no acute cardiopulmonary process identified. In the ED she was treated with NS 1L iv bolus.        * No surgery found *      Hospital Course:   Ms Lorena Contreras was admitted with DKA and HTN emergency after missing meds for 1 week. These both resolved with insulin gtt, IVF, and cardene gtt then starting home medications. There was initial concern for UTI but urine culture is no growth. She has had episodes of facial twitching starting 6/19 which are new and now decreasing in frequency. She is alert during them and not confused after. These episodes are reproducible when she touches her teeth. Neurology consulted. Doubt seizures. May be associated with dental issue. She is stable for discharge to home. Follow up with PCP. Follow up with dentist.        Goals of Care Treatment Preferences:  Code Status: Full Code      Consults:   Consults (From admission, onward)        Status Ordering Provider     Inpatient consult to Neurology  Once        Provider:  Shaq Spicer MD    Completed DANISH CONTRERAS          No new Assessment & Plan notes have been filed under this hospital service since the last note was generated.  Service: Hospital Medicine    Final Active Diagnoses:    Diagnosis Date Noted POA    PRINCIPAL " PROBLEM:  Hypertensive emergency [I16.1] 06/19/2022 Yes    Hypertensive encephalopathy [I67.4] 06/19/2022 Yes    Status post tooth extraction [K08.409] 06/19/2022 Yes    Diastolic dysfunction with chronic heart failure [I50.32] 08/10/2021 Yes    Pulmonary hypertension -- echo 11/2019 [I27.20] 02/06/2020 Yes    Stage 3a chronic kidney disease [N18.31] 12/10/2019 Yes    Coronary artery disease of native artery of native heart with stable angina pectoris [I25.118] 03/29/2019 Yes    Uncontrolled type 2 diabetes mellitus with hyperosmolar nonketotic hyperglycemia [E11.00] 11/08/2016 Yes    Hyperlipidemia [E78.5] 07/30/2015 Yes    Follicular lymphoma [C82.90] 08/26/2014 Yes      Problems Resolved During this Admission:       Discharged Condition: good    Disposition: Home or Self Care    Follow Up: PCP and dentist    Patient Instructions:      Diet diabetic     Diet Cardiac     Notify your health care provider if you experience any of the following:  temperature >100.4     Notify your health care provider if you experience any of the following:  persistent nausea and vomiting or diarrhea     Notify your health care provider if you experience any of the following:  severe uncontrolled pain     Notify your health care provider if you experience any of the following:  redness, tenderness, or signs of infection (pain, swelling, redness, odor or green/yellow discharge around incision site)     Notify your health care provider if you experience any of the following:  difficulty breathing or increased cough     Notify your health care provider if you experience any of the following:  severe persistent headache     Notify your health care provider if you experience any of the following:  worsening rash     Notify your health care provider if you experience any of the following:  persistent dizziness, light-headedness, or visual disturbances     Notify your health care provider if you experience any of the following:   increased confusion or weakness     Activity as tolerated       Significant Diagnostic Studies: Labs: All labs within the past 24 hours have been reviewed    Pending Diagnostic Studies:     None         Medications:  Reconciled Home Medications:      Medication List      CHANGE how you take these medications    acetaminophen 500 MG tablet  Commonly known as: TYLENOL  Take 1 tablet (500 mg total) by mouth every 4 (four) hours as needed for Pain.  What changed: how much to take     FLUoxetine 40 MG capsule  Take 1 capsule (40 mg total) by mouth once daily.  What changed: how much to take     insulin aspart protamine-insulin aspart 100 unit/mL (70-30) Inpn pen  Commonly known as: NovoLOG Mix 70-30FlexPen U-100  Inject 30 Units into the skin 2 (two) times daily before meals.  What changed: additional instructions        CONTINUE taking these medications    amLODIPine 10 MG tablet  Commonly known as: NORVASC  TAKE 1 TABLET EVERY DAY     ASCORBIC ACID (VITAMIN C) ORAL  Take by mouth once daily.     aspirin 81 MG Chew  Take 1 tablet (81 mg total) by mouth once daily.     atenoloL 50 MG tablet  Commonly known as: TENORMIN  Take 1 tablet (50 mg total) by mouth 2 (two) times daily.     atorvastatin 80 MG tablet  Commonly known as: LIPITOR  Take 1 tablet (80 mg total) by mouth every evening.     blood sugar diagnostic Strp  Commonly known as: FREESTYLE TEST  1 strip by Misc.(Non-Drug; Combo Route) route 4 (four) times daily.     CENTRUM COMPLETE ORAL  Take by mouth.     chlorhexidine 0.12 % solution  Commonly known as: PERIDEX  Use as directed 15 mLs in the mouth or throat 2 (two) times daily. for 14 days     cholecalciferol (vitamin D3) 50 mcg (2,000 unit) Cap capsule  Commonly known as: VITAMIN D3  Take 2 capsules by mouth 2 (two) times daily.     CLOVE FLAVORING Oil  Generic drug: flavoring agent (bulk)  1 Dose by Misc.(Non-Drug; Combo Route) route every 4 (four) hours as needed (dental pain).     DIGESTIVE ADVANTAGE IMMUNE  "ORAL  Take by mouth.     EPINEPHrine 0.3 mg/0.3 mL Atin  Commonly known as: EPIPEN  Inject 0.3 mLs (0.3 mg total) into the muscle once. for 1 dose     ESOMEPRAZOLE MAGNESIUM ORAL  Take by mouth as needed.     ferrous sulfate 325 (65 FE) MG EC tablet  Take 1 tablet (325 mg total) by mouth once daily.     gabapentin 300 MG capsule  Commonly known as: NEURONTIN  Take 2 capsules (600 mg total) by mouth 2 (two) times daily.     hydroCHLOROthiazide 25 MG tablet  Commonly known as: HYDRODIURIL  Take 1 tablet (25 mg total) by mouth once daily.     isosorbide mononitrate 30 MG 24 hr tablet  Commonly known as: IMDUR  Take 1 tablet (30 mg total) by mouth once daily.     * lancets 32 gauge Misc  1 lancet by Misc.(Non-Drug; Combo Route) route 4 (four) times daily.     * TRUEPLUS LANCETS 30 gauge Misc  Generic drug: lancets     magnesium oxide 400 mg (241.3 mg magnesium) tablet  Commonly known as: MAG-OX  Take 400 mg by mouth once daily.     meclizine 25 mg tablet  Commonly known as: ANTIVERT  Take 1 tablet (25 mg total) by mouth 3 (three) times daily as needed for Dizziness.     melatonin 10 mg Cap  Take 10 mg by mouth nightly.     metFORMIN 1000 MG tablet  Commonly known as: GLUCOPHAGE  Take 1 tablet (1,000 mg total) by mouth 2 (two) times daily with meals.     nitroGLYCERIN 0.4 MG SL tablet  Commonly known as: NITROSTAT  PLACE 1 TABLET UNDER THE TONGUE EVERY FIVE MINUTES AS NEEDED FOR CHEST PAIN AS DIRECTED     pantoprazole 40 MG tablet  Commonly known as: PROTONIX  Take 1 tablet (40 mg total) by mouth once daily.     pen needle, diabetic 32 gauge x 5/32" Ndle  Commonly known as: BD ULTRA-FINE SAGAR PEN NEEDLE  USE WITH NOVOLOG MIX FLEXPENS     ticagrelor 90 mg tablet  Commonly known as: BRILINTA  Take 1 tablet (90 mg total) by mouth 2 (two) times daily.     VITAMIN B-12 1000 MCG tablet  Generic drug: cyanocobalamin  Take 100 mcg by mouth once daily.     vitamin E 1000 UNIT capsule  Take 1,000 Units by mouth once daily.   "   VOLTAREN TOP  Apply topically.         * This list has 2 medication(s) that are the same as other medications prescribed for you. Read the directions carefully, and ask your doctor or other care provider to review them with you.            STOP taking these medications    clindamycin 150 MG capsule  Commonly known as: CLEOCIN            Indwelling Lines/Drains at time of discharge:   Lines/Drains/Airways     None                 Time spent on the discharge of patient: 35 minutes         Zoila Ying MD  Department of Hospital Medicine  [facility]

## 2022-06-21 NOTE — PROGRESS NOTES
Medication List        CHANGE how you take these medications      acetaminophen 500 MG tablet  Commonly known as: TYLENOL  Take 1 tablet (500 mg total) by mouth every 4 (four) hours as needed for Pain.  What changed: how much to take     FLUoxetine 40 MG capsule  Take 1 capsule (40 mg total) by mouth once daily.  What changed: how much to take     insulin aspart protamine-insulin aspart 100 unit/mL (70-30) Inpn pen  Commonly known as: NovoLOG Mix 70-30FlexPen U-100  Inject 30 Units into the skin 2 (two) times daily before meals.  What changed: additional instructions            CONTINUE taking these medications      amLODIPine 10 MG tablet  Commonly known as: NORVASC  TAKE 1 TABLET EVERY DAY     ASCORBIC ACID (VITAMIN C) ORAL     aspirin 81 MG Chew  Take 1 tablet (81 mg total) by mouth once daily.     atenoloL 50 MG tablet  Commonly known as: TENORMIN  Take 1 tablet (50 mg total) by mouth 2 (two) times daily.     atorvastatin 80 MG tablet  Commonly known as: LIPITOR  Take 1 tablet (80 mg total) by mouth every evening.     blood sugar diagnostic Strp  Commonly known as: FREESTYLE TEST  1 strip by Misc.(Non-Drug; Combo Route) route 4 (four) times daily.     CENTRUM COMPLETE ORAL     chlorhexidine 0.12 % solution  Commonly known as: PERIDEX  Use as directed 15 mLs in the mouth or throat 2 (two) times daily. for 14 days     cholecalciferol (vitamin D3) 50 mcg (2,000 unit) Cap capsule  Commonly known as: VITAMIN D3     CLOVE FLAVORING Oil  Generic drug: flavoring agent (bulk)  1 Dose by Misc.(Non-Drug; Combo Route) route every 4 (four) hours as needed (dental pain).     DIGESTIVE ADVANTAGE IMMUNE ORAL     EPINEPHrine 0.3 mg/0.3 mL Atin  Commonly known as: EPIPEN  Inject 0.3 mLs (0.3 mg total) into the muscle once. for 1 dose     ESOMEPRAZOLE MAGNESIUM ORAL     ferrous sulfate 325 (65 FE) MG EC tablet  Take 1 tablet (325 mg total) by mouth once daily.     gabapentin 300 MG capsule  Commonly known as: NEURONTIN  Take 2  "capsules (600 mg total) by mouth 2 (two) times daily.     hydroCHLOROthiazide 25 MG tablet  Commonly known as: HYDRODIURIL  Take 1 tablet (25 mg total) by mouth once daily.     isosorbide mononitrate 30 MG 24 hr tablet  Commonly known as: IMDUR  Take 1 tablet (30 mg total) by mouth once daily.     * lancets 32 gauge Misc  1 lancet by Misc.(Non-Drug; Combo Route) route 4 (four) times daily.     * TRUEPLUS LANCETS 30 gauge Misc  Generic drug: lancets     magnesium oxide 400 mg (241.3 mg magnesium) tablet  Commonly known as: MAG-OX     meclizine 25 mg tablet  Commonly known as: ANTIVERT  Take 1 tablet (25 mg total) by mouth 3 (three) times daily as needed for Dizziness.     melatonin 10 mg Cap     metFORMIN 1000 MG tablet  Commonly known as: GLUCOPHAGE  Take 1 tablet (1,000 mg total) by mouth 2 (two) times daily with meals.     nitroGLYCERIN 0.4 MG SL tablet  Commonly known as: NITROSTAT  PLACE 1 TABLET UNDER THE TONGUE EVERY FIVE MINUTES AS NEEDED FOR CHEST PAIN AS DIRECTED     pantoprazole 40 MG tablet  Commonly known as: PROTONIX  Take 1 tablet (40 mg total) by mouth once daily.     pen needle, diabetic 32 gauge x 5/32" Ndle  Commonly known as: BD ULTRA-FINE SAGAR PEN NEEDLE  USE WITH NOVOLOG MIX FLEXPENS     ticagrelor 90 mg tablet  Commonly known as: BRILINTA  Take 1 tablet (90 mg total) by mouth 2 (two) times daily.     VITAMIN B-12 1000 MCG tablet  Generic drug: cyanocobalamin     vitamin E 1000 UNIT capsule     [facility]           * This list has 2 medication(s) that are the same as other medications prescribed for you. Read the directions carefully, and ask your doctor or other care provider to review them with you.                STOP taking these medications      clindamycin 150 MG capsule  Commonly known as: CLEOCIN             "

## 2022-06-21 NOTE — PLAN OF CARE
Pt lying in bed resting quietly overnight.  No distress noted.  Call bell within reach.  Bed locked and in lowest position.      Problem: Adult Inpatient Plan of Care  Goal: Plan of Care Review  Outcome: Ongoing, Not Progressing  Goal: Patient-Specific Goal (Individualized)  Outcome: Ongoing, Not Progressing  Goal: Absence of Hospital-Acquired Illness or Injury  Outcome: Ongoing, Not Progressing  Goal: Optimal Comfort and Wellbeing  Outcome: Ongoing, Not Progressing  Goal: Readiness for Transition of Care  Outcome: Ongoing, Not Progressing     Problem: Diabetic Ketoacidosis  Goal: Fluid and Electrolyte Balance with Absence of Ketosis  Outcome: Ongoing, Not Progressing     Problem: Infection  Goal: Absence of Infection Signs and Symptoms  Outcome: Ongoing, Not Progressing     Problem: Fall Injury Risk  Goal: Absence of Fall and Fall-Related Injury  Outcome: Ongoing, Not Progressing     Problem: Diabetes Comorbidity  Goal: Blood Glucose Level Within Targeted Range  Outcome: Ongoing, Not Progressing

## 2022-06-21 NOTE — PROGRESS NOTES
[facility] - Telemetry  Neurology  Progress Note    Patient Name: Lorena Contreras  MRN: [id number]  Admission Date: 6/19/2022  Hospital Length of Stay: 2 days  Code Status: Full Code   Attending Provider: Zoila Ying MD  Primary Care Physician: Jorge Paris MD   Principal Problem:Hypertensive emergency    Subjective:     Interval History: 72 y/o female with medical Hx of HTN, DM comes to ED for confusion and hyperglycemia. Pt found to have a BP of 240/170's and BG in 700's. Pt has been consulted to neurology due to episode of right facial twitching. This happened in ED while pt was confused. Her daughter took video of it. Ms Contreras currently denies headache, visual or speech disturbance, vertigo, weakness or numbness of limbs. She does tell me that she has burning pain in her feet as result of peripheral neuropathy from DM.    -6/21/22: No new issues. Pt tells me that she feels better. She also states that she likes to eat rice and candy after meals and that's what is driving her BG up. She also like Chinese food and knows that is bad for her BP.    Current Neurological Medications:     Current Facility-Administered Medications   Medication Dose Route Frequency Provider Last Rate Last Admin    acetaminophen suppository 650 mg  650 mg Rectal Q4H PRN SEDA Rubio MD        amLODIPine tablet 10 mg  10 mg Oral Daily Zoila Ying MD   10 mg at 06/21/22 0848    aspirin chewable tablet 81 mg  81 mg Oral Daily SEDA Rubio MD   81 mg at 06/21/22 0849    atenoloL tablet 50 mg  50 mg Oral BID SEDA Rubio MD   50 mg at 06/21/22 0849    atorvastatin tablet 80 mg  80 mg Oral QHS SEDA Rubio MD   80 mg at 06/20/22 2127    bisacodyL suppository 10 mg  10 mg Rectal Daily PRN SEDA Rubio MD        dextrose 50% injection 12.5 g  12.5 g Intravenous PRN SEDA Rubio MD        dextrose 50% injection 25 g  25 g Intravenous PRN SEDA Rubio MD        enoxaparin injection 40 mg   40 mg Subcutaneous Daily Zoila Ying MD   40 mg at 06/20/22 1748    ferrous sulfate tablet 1 each  1 tablet Oral Daily SEDA Rubio MD   1 each at 06/21/22 0849    FLUoxetine capsule 40 mg  40 mg Oral Daily SEDA Rubio MD   40 mg at 06/21/22 0848    gabapentin capsule 600 mg  600 mg Oral BID SEDA Rubio MD   600 mg at 06/21/22 0849    glucagon (human recombinant) injection 1 mg  1 mg Intramuscular PRN SEDA Rubio MD        glucose chewable tablet 16 g  16 g Oral PRN SEDA Rubio MD        glucose chewable tablet 16 g  16 g Oral PRN SEDA Rubio MD        glucose chewable tablet 24 g  24 g Oral PRN SEDA Rubio MD        hydroCHLOROthiazide tablet 25 mg  25 mg Oral Daily Zoila Ying MD   25 mg at 06/21/22 0849    insulin aspart U-100 pen 1-10 Units  1-10 Units Subcutaneous QID (AC + HS) PRN SEDA Rubio MD   8 Units at 06/21/22 0749    insulin aspart U-100 pen 15 Units  15 Units Subcutaneous TIDWM Zoila Ying MD        insulin detemir U-100 pen 30 Units  30 Units Subcutaneous Daily Zoila Ying MD   30 Units at 06/21/22 0852    isosorbide mononitrate 24 hr tablet 30 mg  30 mg Oral Daily Zoila Ying MD   30 mg at 06/21/22 0850    naloxone 0.4 mg/mL injection 0.02 mg  0.02 mg Intravenous PRN SEDA Rubio MD        nitroGLYCERIN SL tablet 0.4 mg  0.4 mg Sublingual Q5 Min PRN SEDA Rubio MD        ondansetron injection 4 mg  4 mg Intravenous Q8H PRN SEDA Rubio MD        pantoprazole EC tablet 40 mg  40 mg Oral Daily SEDA Rubio MD   40 mg at 06/21/22 0849    promethazine (PHENERGAN) 12.5 mg in dextrose 5 % 50 mL IVPB  12.5 mg Intravenous Q6H PRN SEDA Rubio MD        sodium chloride 0.9% flush 10 mL  10 mL Intravenous Q12H PRN SEDA Rubio MD        ticagrelor tablet 90 mg  90 mg Oral BID SEDA Rubio MD   90 mg at 06/21/22 0849    vitamin D 1000 units tablet 4,000 Units  4,000 Units Oral Daily SEDA Anna  MD Ramiro   4,000 Units at 06/21/22 0848       Review of Systems   Constitutional: Negative for fever.   HENT: Negative for trouble swallowing.    Eyes: Negative for photophobia.   Respiratory: Negative for shortness of breath.    Cardiovascular: Negative for chest pain.   Gastrointestinal: Negative for abdominal pain.   Genitourinary: Negative for dysuria.   Musculoskeletal: Negative for back pain.   Neurological: Negative for headaches.   Psychiatric/Behavioral: Negative for confusion.     Objective:     Vital Signs (Most Recent):  Temp: 98.4 °F (36.9 °C) (06/21/22 0730)  Pulse: 70 (06/21/22 0730)  Resp: 20 (06/21/22 0730)  BP: (!) 140/65 (06/21/22 0730)  SpO2: 100 % (06/21/22 0730) Vital Signs (24h Range):  Temp:  [97.4 °F (36.3 °C)-99.1 °F (37.3 °C)] 98.4 °F (36.9 °C)  Pulse:  [70-86] 70  Resp:  [16-20] 20  SpO2:  [96 %-100 %] 100 %  BP: (137-179)/(64-75) 140/65     Weight: 79.7 kg (175 lb 11.3 oz)  Body mass index is 25.95 kg/m².    Physical Exam  Neurological:      Motor: Motor strength is normal.       Constitutional:       General: She is not in acute distress.  HENT:      Head: Normocephalic.      Right Ear: External ear normal.      Left Ear: External ear normal.   Eyes:      General:         Right eye: No discharge.         Left eye: No discharge.   Cardiovascular:      Rate and Rhythm: Normal rate.   Pulmonary:      Breath sounds: Normal breath sounds.   Abdominal:      Palpations: Abdomen is soft.   Musculoskeletal:         General: No swelling.      Cervical back: Neck supple.   Skin:     Findings: No rash.   Neurological:      Mental Status: She is oriented to person, place, and time.   Psychiatric:         Speech: Speech normal.            NEUROLOGICAL EXAMINATION:      MENTAL STATUS   Oriented to person, place, and time.   Speech: speech is normal   Level of consciousness: alert     NEUROLOGICAL EXAMINATION:     CRANIAL NERVES     CN III, IV, VI   Right pupil: Size: 2 mm.   Left pupil: Size: 2 mm.    Nystagmus: none   Conjugate gaze: present    CN V   Right facial sensation deficit: none  Left facial sensation deficit: none    CN VII   Right facial weakness: none  Left facial weakness: none    CN XI   Right trapezius strength: normal  Left trapezius strength: normal    CN XII   Tongue deviation: none    MOTOR EXAM     Strength   Strength 5/5 throughout.       Significant Labs:   CBC:   Recent Labs   Lab 06/19/22  1823 06/20/22  0525 06/21/22  0349   WBC 10.57 12.38 7.32   HGB 12.7 12.9 11.5*   HCT 38.5 39.3 36.9*    253 186     CMP:   Recent Labs   Lab 06/19/22  1823 06/19/22  2110 06/20/22  0135 06/20/22  0417 06/20/22  0525 06/21/22  0349   *   < > 295* 294*  --  261*   *   < > 136 137  --  138   K 4.6   < > 4.2 4.4  --  4.9   CL 95   < > 106 105  --  105   CO2 22*   < > 22* 23  --  25   BUN 23   < > 17 17  --  19   CREATININE 1.4   < > 0.9 1.0  --  1.2   CALCIUM 9.4   < > 8.3* 8.8  --  8.9   MG 1.6  --   --   --  1.6 1.5*   PROT 7.6  --   --   --   --  6.1   ALBUMIN 3.6  --   --   --   --  2.6*   BILITOT 0.5  --   --   --   --  0.2   ALKPHOS 146*  --   --   --   --  97   AST 26  --   --   --   --  31   ALT 31  --   --   --   --  27   ANIONGAP 11   < > 8 9  --  8   EGFRNONAA 38*   < > >60 57*  --  46*    < > = values in this interval not displayed.       Significant Imaging: I have reviewed all pertinent imaging results/findings within the past 24 hours.    Assessment and Plan:     72 y/o female consulted for possible seizure     1. Seizure?: This could be a seizure given hyperglycemia and marked hypertension. Pt is now alert and fully oriented. No major motor or sensory deficits on exam.           MRI brain with no acute abnormalities.           -BP and BG control.           -No need for AED for now.   -OK to D/C home from neurology standpoint.    Active Diagnoses:    Diagnosis Date Noted POA    PRINCIPAL PROBLEM:  Hypertensive emergency [I16.1] 06/19/2022 Yes    Hypertensive  encephalopathy [I67.4] 06/19/2022 Yes    Status post tooth extraction [K08.409] 06/19/2022 Yes    Diastolic dysfunction with chronic heart failure [I50.32] 08/10/2021 Yes    Pulmonary hypertension -- echo 11/2019 [I27.20] 02/06/2020 Yes    Stage 3a chronic kidney disease [N18.31] 12/10/2019 Yes    Coronary artery disease of native artery of native heart with stable angina pectoris [I25.118] 03/29/2019 Yes    Uncontrolled type 2 diabetes mellitus with hyperosmolar nonketotic hyperglycemia [E11.00] 11/08/2016 Yes    Hyperlipidemia [E78.5] 07/30/2015 Yes    Follicular lymphoma [C82.90] 08/26/2014 Yes      Problems Resolved During this Admission:       VTE Risk Mitigation (From admission, onward)         Ordered     enoxaparin injection 40 mg  Daily         06/20/22 0735                Shaq Spicer MD  Neurology  [facility] - Telemetry

## 2022-06-21 NOTE — PT/OT/SLP PROGRESS
Speech Language Pathology Treatment    Patient Name:  Lorena Contreras   MRN:  [id number]  Admitting Diagnosis: Hypertensive emergency    Recommendations:                 General Recommendations:  Follow up x1   Diet recommendations:  Soft & Bite Sized Diet - IDDSI Level 6, Liquid Diet Level: Thin   Aspiration Precautions: Alternating small bites/sips, Feed only when awake/alert, Frequent oral care, HOB to 90 degrees, Meds whole buried in puree and Monitor for s/s of aspiration   General Precautions: Standard    Communication strategies:  none    Subjective   Pt eager to discharge. She was indep oriented x4, pt cooperative and reportedly had not eaten since experiencing right side facial twitching this AM during breakfast, at which time she discontinued her meal.    Pain/Comfort:  · Pain Rating 1: 0/10    Respiratory Status: Room air    Objective:     Has the patient been evaluated by SLP for swallowing?   Yes  Keep patient NPO? No   Current Respiratory Status:    Room air     Pt assessed with PO trials of TL via straw, puree, and soft solid. Across trials no overt s/s of aspiration. Pt demo use of safe swallow strategies, ie, sat at EOB for trials, alternated small bites/sips, and demo reduced rate of intake.     Assessment:     Lorena Contreras is a 71 y.o. female with an SLP diagnosis of mild oral dysphagia, characterized by impaired mastication 2/2 upper scattered dentition and intermittent right side facial twitching. At this time, she presents with adequate swallowing for current LRD/liquid level. ST will follow for diet tolerance, and education for use of swallow precautions with PO intake x1.     Goals:   Multidisciplinary Problems     SLP Goals        Problem: SLP    Goal Priority Disciplines Outcome   SLP Goal     SLP Ongoing, Progressing   Description: 1. Pt will tolerate LRD/liquid level without overt s/s of aspiration.   2. Pt will tolerate dysphagia soft diet, IDDSI level 6 without overt s/s of  aspiration.   3. Pt will demonstrate knowledge/use of 3/3 safe swallow strategies with PO trials.                    Plan:     · Patient to be seen:  1 x/week   · Plan of Care expires:     · Plan of Care reviewed with:  patient   · SLP Follow-Up:  Yes       Discharge recommendations:  other (see comments) (TBD)   Barriers to Discharge:  None    Time Tracking:     SLP Treatment Date:   06/21/22  Speech Start Time:  1002  Speech Stop Time:  1016     Speech Total Time (min):  14 min    Billable Minutes: Treatment Swallowing Dysfunction 14min    06/21/2022

## 2022-06-21 NOTE — PLAN OF CARE
Problem: SLP  Goal: SLP Goal  Description: 1. Pt will tolerate LRD/liquid level without overt s/s of aspiration.   2. Pt will tolerate dysphagia soft diet, IDDSI level 6 without overt s/s of aspiration.   3. Pt will demonstrate knowledge/use of 3/3 safe swallow strategies with PO trials.   Outcome: Ongoing, Progressing     Pt progressing, ST follow up x1

## 2022-06-23 ENCOUNTER — OFFICE VISIT (OUTPATIENT)
Dept: FAMILY MEDICINE | Facility: CLINIC | Age: 72
End: 2022-06-23
Payer: MEDICARE

## 2022-06-23 VITALS
DIASTOLIC BLOOD PRESSURE: 56 MMHG | HEIGHT: 69 IN | WEIGHT: 178.56 LBS | HEART RATE: 89 BPM | TEMPERATURE: 98 F | SYSTOLIC BLOOD PRESSURE: 138 MMHG | BODY MASS INDEX: 26.45 KG/M2 | OXYGEN SATURATION: 99 %

## 2022-06-23 DIAGNOSIS — K08.409 STATUS POST TOOTH EXTRACTION: ICD-10-CM

## 2022-06-23 DIAGNOSIS — I25.118 CORONARY ARTERY DISEASE OF NATIVE ARTERY OF NATIVE HEART WITH STABLE ANGINA PECTORIS: ICD-10-CM

## 2022-06-23 DIAGNOSIS — I16.1 HYPERTENSIVE EMERGENCY: ICD-10-CM

## 2022-06-23 DIAGNOSIS — E78.5 HYPERLIPIDEMIA, UNSPECIFIED HYPERLIPIDEMIA TYPE: ICD-10-CM

## 2022-06-23 DIAGNOSIS — I27.20 PULMONARY HYPERTENSION: ICD-10-CM

## 2022-06-23 DIAGNOSIS — I67.4 HYPERTENSIVE ENCEPHALOPATHY: ICD-10-CM

## 2022-06-23 DIAGNOSIS — Z09 HOSPITAL DISCHARGE FOLLOW-UP: Primary | ICD-10-CM

## 2022-06-23 DIAGNOSIS — E11.00 UNCONTROLLED TYPE 2 DIABETES MELLITUS WITH HYPEROSMOLAR NONKETOTIC HYPERGLYCEMIA: ICD-10-CM

## 2022-06-23 DIAGNOSIS — I50.32 DIASTOLIC DYSFUNCTION WITH CHRONIC HEART FAILURE: ICD-10-CM

## 2022-06-23 PROCEDURE — 3008F BODY MASS INDEX DOCD: CPT | Mod: CPTII,S$GLB,, | Performed by: NURSE PRACTITIONER

## 2022-06-23 PROCEDURE — 1126F PR PAIN SEVERITY QUANTIFIED, NO PAIN PRESENT: ICD-10-PCS | Mod: CPTII,S$GLB,, | Performed by: NURSE PRACTITIONER

## 2022-06-23 PROCEDURE — 1160F PR REVIEW ALL MEDS BY PRESCRIBER/CLIN PHARMACIST DOCUMENTED: ICD-10-PCS | Mod: CPTII,S$GLB,, | Performed by: NURSE PRACTITIONER

## 2022-06-23 PROCEDURE — 3046F HEMOGLOBIN A1C LEVEL >9.0%: CPT | Mod: CPTII,S$GLB,, | Performed by: NURSE PRACTITIONER

## 2022-06-23 PROCEDURE — 3078F DIAST BP <80 MM HG: CPT | Mod: CPTII,S$GLB,, | Performed by: NURSE PRACTITIONER

## 2022-06-23 PROCEDURE — 3062F PR POS MACROALBUMINURIA RESULT DOCUMENTED/REVIEW: ICD-10-PCS | Mod: CPTII,S$GLB,, | Performed by: NURSE PRACTITIONER

## 2022-06-23 PROCEDURE — 1160F RVW MEDS BY RX/DR IN RCRD: CPT | Mod: CPTII,S$GLB,, | Performed by: NURSE PRACTITIONER

## 2022-06-23 PROCEDURE — 1159F PR MEDICATION LIST DOCUMENTED IN MEDICAL RECORD: ICD-10-PCS | Mod: CPTII,S$GLB,, | Performed by: NURSE PRACTITIONER

## 2022-06-23 PROCEDURE — 1126F AMNT PAIN NOTED NONE PRSNT: CPT | Mod: CPTII,S$GLB,, | Performed by: NURSE PRACTITIONER

## 2022-06-23 PROCEDURE — 3075F SYST BP GE 130 - 139MM HG: CPT | Mod: CPTII,S$GLB,, | Performed by: NURSE PRACTITIONER

## 2022-06-23 PROCEDURE — 1111F PR DISCHARGE MEDS RECONCILED W/ CURRENT OUTPATIENT MED LIST: ICD-10-PCS | Mod: CPTII,S$GLB,, | Performed by: NURSE PRACTITIONER

## 2022-06-23 PROCEDURE — 3046F PR MOST RECENT HEMOGLOBIN A1C LEVEL > 9.0%: ICD-10-PCS | Mod: CPTII,S$GLB,, | Performed by: NURSE PRACTITIONER

## 2022-06-23 PROCEDURE — 99999 PR PBB SHADOW E&M-EST. PATIENT-LVL V: CPT | Mod: PBBFAC,,, | Performed by: NURSE PRACTITIONER

## 2022-06-23 PROCEDURE — 1100F PR PT FALLS ASSESS DOC 2+ FALLS/FALL W/INJURY/YR: ICD-10-PCS | Mod: CPTII,S$GLB,, | Performed by: NURSE PRACTITIONER

## 2022-06-23 PROCEDURE — 1159F MED LIST DOCD IN RCRD: CPT | Mod: CPTII,S$GLB,, | Performed by: NURSE PRACTITIONER

## 2022-06-23 PROCEDURE — 3008F PR BODY MASS INDEX (BMI) DOCUMENTED: ICD-10-PCS | Mod: CPTII,S$GLB,, | Performed by: NURSE PRACTITIONER

## 2022-06-23 PROCEDURE — 3066F NEPHROPATHY DOC TX: CPT | Mod: CPTII,S$GLB,, | Performed by: NURSE PRACTITIONER

## 2022-06-23 PROCEDURE — 3078F PR MOST RECENT DIASTOLIC BLOOD PRESSURE < 80 MM HG: ICD-10-PCS | Mod: CPTII,S$GLB,, | Performed by: NURSE PRACTITIONER

## 2022-06-23 PROCEDURE — 3288F FALL RISK ASSESSMENT DOCD: CPT | Mod: CPTII,S$GLB,, | Performed by: NURSE PRACTITIONER

## 2022-06-23 PROCEDURE — 1100F PTFALLS ASSESS-DOCD GE2>/YR: CPT | Mod: CPTII,S$GLB,, | Performed by: NURSE PRACTITIONER

## 2022-06-23 PROCEDURE — 99214 OFFICE O/P EST MOD 30 MIN: CPT | Mod: S$GLB,,, | Performed by: NURSE PRACTITIONER

## 2022-06-23 PROCEDURE — 3075F PR MOST RECENT SYSTOLIC BLOOD PRESS GE 130-139MM HG: ICD-10-PCS | Mod: CPTII,S$GLB,, | Performed by: NURSE PRACTITIONER

## 2022-06-23 PROCEDURE — 1111F DSCHRG MED/CURRENT MED MERGE: CPT | Mod: CPTII,S$GLB,, | Performed by: NURSE PRACTITIONER

## 2022-06-23 PROCEDURE — 99999 PR PBB SHADOW E&M-EST. PATIENT-LVL V: ICD-10-PCS | Mod: PBBFAC,,, | Performed by: NURSE PRACTITIONER

## 2022-06-23 PROCEDURE — 3062F POS MACROALBUMINURIA REV: CPT | Mod: CPTII,S$GLB,, | Performed by: NURSE PRACTITIONER

## 2022-06-23 PROCEDURE — 3288F PR FALLS RISK ASSESSMENT DOCUMENTED: ICD-10-PCS | Mod: CPTII,S$GLB,, | Performed by: NURSE PRACTITIONER

## 2022-06-23 PROCEDURE — 3066F PR DOCUMENTATION OF TREATMENT FOR NEPHROPATHY: ICD-10-PCS | Mod: CPTII,S$GLB,, | Performed by: NURSE PRACTITIONER

## 2022-06-23 PROCEDURE — 99214 PR OFFICE/OUTPT VISIT, EST, LEVL IV, 30-39 MIN: ICD-10-PCS | Mod: S$GLB,,, | Performed by: NURSE PRACTITIONER

## 2022-06-23 NOTE — PROGRESS NOTES
"Chief Complaint  Chief Complaint   Patient presents with    Hospital Follow Up       HPI    HPI   Ms. Lorena Contreras is a 71 y.o. female with medical problems as listed below. The patient presents to clinic for hospital f/u.     Hospital course as follows:    Patient Name: Lorena Contreras  MRN: [id number]  Patient Class: IP- Inpatient  Admission Date: 6/19/2022  Hospital Length of Stay: 2 days  Discharge Date and Time:  06/21/2022 10:17 AM  Attending Physician: Zoila Ying MD   Discharging Provider: Zoila Ying MD  Primary Care Provider: Jorge Paris MD        HPI:   Ms. Contreras is a 70yo lady with a past medical history of Fe deficiency anemia, CAD, depression, DM2, follicular lymphoma, GERD, HTN, CVA, right mandibular molar extraction (about 1 month ago), and restless leg syndrome.     She was just seen in the ED yesterday with complaints of generalized weakness and, "glucose>500, feels "weak" for the last few days. also notes R maxillary dental pain, tooth extraction months ago at the site. The pt informs me that her glucose is chronically elevated despite treatment, sometimes in the 1000 range."  Dr. Perry noted, "Repeat glucose 498. Have written for additional insulin.  Glucose 369. Will write for 4 more units IV insulin and discharge her. She has an appt with her dentist next week. Will refer to endocrine to see about improved dm glycemic control."  She was discharged on Clindamycin 300mg po Q8 hours, Peridex 15 cc BID, and Clove oil.     She now returns to the ED with her sister due to worsening confusion and high glucose.  The patient is able to carry on a conversation with me and is semi-logical, but has puerile demeanor and is acting strangely.  She is laughing at odd times.  She is also confused as to why she is in the ED, and looks to the nurse to ask what is wrong with her.  Her sister is no longer in the ED.     She states she feels fine except for some problems speaking " "and swallowing, which she attributes to her recent dental issues (apparently a right lower molar was pulled, and a left lower molar had some pins placed and a root canal.  She denies terrance mouth pain, fever or chills.  She denies N/V/D.  She denies SOB or CP.  She is also having, "mouth quivering," problems speaking, problems swallowing (she failed her swallow study).     In the ED her VS's were BP (!) 243/175 -> see therapy below ->172/75   Pulse 91   Temp 99 °F (37.2 °C) (Rectal)   Resp 18   Wt 78.5 kg (173 lb)   SpO2 100%   Breastfeeding No   BMI 25.55 kg/m².  Labs showed normal CBC, INR 0.9.  CMP with Na 128, HCO3 22, Cr 1.4, Gluc 656, and normal LFT's, , trop 0.024, BHB 1.3, TSH 0.377.  VBG showed pH 7.35, PCO2 46. COVID yesterday was negative.     CT head showed no acute intracranial process, and changes of chronic small vessel ischemic disease and cerebral volume loss.  CXR showed no acute cardiopulmonary process identified. In the ED she was treated with NS 1L iv bolus.           * No surgery found *       Hospital Course:   Ms Lorena Contreras was admitted with DKA and HTN emergency after missing meds for 1 week. These both resolved with insulin gtt, IVF, and cardene gtt then starting home medications. There was initial concern for UTI but urine culture is no growth. She has had episodes of facial twitching starting 6/19 which are new and now decreasing in frequency. She is alert during them and not confused after. These episodes are reproducible when she touches her teeth. Neurology consulted. Doubt seizures. May be associated with dental issue. She is stable for discharge to home. Follow up with PCP. Follow up with dentist.     The patient since discharge:    The patient states that he glucose was 89 this morning. The highest 200s during lunch and lowest she has seen was 40s. The patient states that she does not have an appetite and tends to eat 1-2 meals per day. She does not wake up " until 12pm on most days.    Discussed portion control and eating small 6 meals.     The patient states that he mouth is still in pain. Has not made an appointment for the mouth yet. Cant get tooth pulled due to cardiac history.    Has not been back to cardiology since 2020.    The patient does not take BP reading at home.    Denies any CP or SOB.     PAST MEDICAL HISTORY:  Past Medical History:   Diagnosis Date    Allergy     Altered mental status 06/19/2022    DYSARTHRIA, SPASTIC MOVEMENTS & DIFFICULTY SWALLOWING    Anemia     Anxiety     Arthritis     Cataract     both removed    Colon polyps     Coronary artery disease     Depression     Diabetes mellitus, type II     Disorder of kidney and ureter     Fibromyalgia     Follicular lymphoma     GERD (gastroesophageal reflux disease)     HTN (hypertension)     Hyperlipidemia     MI (myocardial infarction) 03/2019    Restless leg syndrome     Stroke        PAST SURGICAL HISTORY:  Past Surgical History:   Procedure Laterality Date    COLONOSCOPY  11/07/2012    Colon polyp found; repeat in 5 years    ELBOW SURGERY Right 2015    dislocation repair     ESOPHAGOGASTRODUODENOSCOPY  11/07/2012    atrophic gastritis, H pylori testing negative    INCISION AND DRAINAGE FOOT Right 6/2/2021    Procedure: INCISION AND DRAINAGE, FOOT, bone biopsy;  Surgeon: Quiana Penn DPM;  Location: [facility] OR;  Service: Podiatry;  Laterality: Right;    KNEE SURGERY Bilateral 2015    scoped    LEFT HEART CATHETERIZATION Left 3/29/2019    Procedure: Left heart cath;  Surgeon: Bladimir Barbosa MD;  Location: [facility] CATH LAB;  Service: Cardiology;  Laterality: Left;    LEFT HEART CATHETERIZATION Left 11/18/2019    Procedure: Left heart cath;  Surgeon: Karl Rico MD;  Location: [facility] CATH LAB;  Service: Cardiology;  Laterality: Left;    LEFT HEART CATHETERIZATION Left 1/8/2020    Procedure: Left heart cath, right radial, noon start;  Surgeon: Christos Monreal MD;   Location: [facility] CATH LAB;  Service: Cardiology;  Laterality: Left;  RN Pre Op 1-6-20.  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  2 dtr.  Lives with .  3 cats and a dog.  Retired.  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  Lives in house, 1 story and 4 steps up and has a ramp.      Enjoys crafting.  Unable to bowl due to myalgias.       Social Determinants of Health     Financial Resource Strain: Low Risk     Difficulty of Paying Living Expenses: Not hard at all   Food Insecurity: No Food Insecurity    Worried About Running Out of Food in the Last Year: Never true    Ran Out of Food in the Last Year: Never true   Transportation Needs: Unmet Transportation Needs    Lack of Transportation (Medical): Yes    Lack of Transportation (Non-Medical): Yes   Physical Activity: Inactive    Days of Exercise per Week: 0 days    Minutes of Exercise per Session: 0 min   Stress: Stress Concern Present    Feeling of Stress : Very much   Social Connections: Socially Isolated    Frequency of Communication with Friends and Family: More than three times a week    Frequency of Social Gatherings with Friends and Family: More than three times a week    Attends Sabianist Services: Never    Active Member of Clubs or Organizations: No    Attends Club or Organization Meetings: Never     Marital Status:        FAMILY HISTORY:  Family History   Problem Relation Age of Onset    Cancer Mother         colon    Heart disease Mother     Cancer Father         lung    Lung cancer Brother     Diabetes Sister     Hypertension Sister     Allergy (severe) Daughter     No Known Problems Daughter     Stroke Neg Hx     Hyperlipidemia Neg Hx        ALLERGIES AND MEDICATIONS: updated and reviewed.  Review of patient's allergies indicates:   Allergen Reactions    Novolin 70/30 (semi-synthetic) Nausea And Vomiting     Severe vomiting on Relion 70/30    Shellfish containing products Swelling    Sulfa (sulfonamide antibiotics) Anaphylaxis    Talwin [pentazocine lactate] Anaphylaxis    Victoza [liraglutide] Nausea And Vomiting    Glipizide Nausea Only    Citric acid     Codeine     Influenza virus vaccines Hives    Iodine and iodide containing products Hives    Rituxan [rituximab] Hives    Zoloft [sertraline] Nausea And Vomiting     Current Outpatient Medications   Medication Sig Dispense Refill    acetaminophen (TYLENOL) 500 MG tablet Take 1 tablet (500 mg total) by mouth every 4 (four) hours as needed for Pain. (Patient taking differently: Take 1,000 mg by mouth every 4 (four) hours as needed for Pain.)  0    amLODIPine (NORVASC) 10 MG tablet TAKE 1 TABLET EVERY DAY 90 tablet 3    ASCORBIC ACID, VITAMIN C, ORAL Take by mouth once daily.      aspirin 81 MG Chew Take 1 tablet (81 mg total) by mouth once daily. 90 tablet 3    atenoloL (TENORMIN) 50 MG tablet Take 1 tablet (50 mg total) by mouth 2 (two) times daily. 180 tablet 3    atorvastatin (LIPITOR) 80 MG tablet Take 1 tablet (80 mg total) by mouth every evening. 90 tablet 3    Bacillus coagulans (DIGESTIVE ADVANTAGE IMMUNE ORAL) Take by mouth.      blood sugar diagnostic (FREESTYLE TEST) Strp 1 strip by Misc.(Non-Drug; Combo Route) route 4 (four) times daily. 400 each 4    chlorhexidine (PERIDEX) 0.12 % solution Use as directed  15 mLs in the mouth or throat 2 (two) times daily. for 14 days 473 mL 0    cholecalciferol, vitamin D3, (VITAMIN D3) 50 mcg (2,000 unit) Cap Take 2 capsules by mouth 2 (two) times daily.      cyanocobalamin (VITAMIN B-12) 1000 MCG tablet Take 100 mcg by mouth once daily.      diclofenac sodium (VOLTAREN TOP) Apply topically.      ESOMEPRAZOLE MAGNESIUM ORAL Take by mouth as needed.      ferrous sulfate 325 (65 FE) MG EC tablet Take 1 tablet (325 mg total) by mouth once daily. 30 tablet 11    flavoring agent, bulk, (CLOVE FLAVORING) Oil 1 Dose by Misc.(Non-Drug; Combo Route) route every 4 (four) hours as needed (dental pain). 3.7 mL 0    FLUoxetine 40 MG capsule Take 1 capsule (40 mg total) by mouth once daily. (Patient taking differently: Take 80 mg by mouth once daily.) 90 capsule 3    gabapentin (NEURONTIN) 300 MG capsule Take 2 capsules (600 mg total) by mouth 2 (two) times daily. 180 capsule 1    hydroCHLOROthiazide (HYDRODIURIL) 25 MG tablet Take 1 tablet (25 mg total) by mouth once daily. 90 tablet 3    insulin aspart protamine-insulin aspart (NOVOLOG MIX 70-30FLEXPEN U-100) 100 unit/mL (70-30) InPn pen Inject 30 Units into the skin 2 (two) times daily before meals. (Patient taking differently: Inject 30 Units into the skin 2 (two) times daily before meals. 35 units) 4 Box 3    isosorbide mononitrate (IMDUR) 30 MG 24 hr tablet Take 1 tablet (30 mg total) by mouth once daily. 90 tablet 3    lancets 32 gauge Misc 1 lancet by Misc.(Non-Drug; Combo Route) route 4 (four) times daily. 360 each 3    magnesium oxide (MAG-OX) 400 mg tablet Take 400 mg by mouth once daily.      meclizine (ANTIVERT) 25 mg tablet Take 1 tablet (25 mg total) by mouth 3 (three) times daily as needed for Dizziness. 20 tablet 0    melatonin 10 mg Cap Take 10 mg by mouth nightly.      metFORMIN (GLUCOPHAGE) 1000 MG tablet Take 1 tablet (1,000 mg total) by mouth 2 (two) times daily with meals. 180 tablet 3     "multivitamin/iron/folic acid (CENTRUM COMPLETE ORAL) Take by mouth.      nitroGLYCERIN (NITROSTAT) 0.4 MG SL tablet PLACE 1 TABLET UNDER THE TONGUE EVERY FIVE MINUTES AS NEEDED FOR CHEST PAIN AS DIRECTED 25 tablet 11    pantoprazole (PROTONIX) 40 MG tablet Take 1 tablet (40 mg total) by mouth once daily. 90 tablet 1    pen needle, diabetic (BD ULTRA-FINE SAGAR PEN NEEDLE) 32 gauge x 5/32" Ndle USE WITH NOVOLOG MIX FLEXPENS 400 each 3    ticagrelor (BRILINTA) 90 mg tablet Take 1 tablet (90 mg total) by mouth 2 (two) times daily. 180 tablet 3    TRUEPLUS LANCETS 30 gauge Misc       vitamin E 1000 UNIT capsule Take 1,000 Units by mouth once daily.      EPINEPHrine (EPIPEN) 0.3 mg/0.3 mL AtIn Inject 0.3 mLs (0.3 mg total) into the muscle once. for 1 dose 0.3 mL 1     No current facility-administered medications for this visit.       Patient Care Team:  Jorge Paris MD as PCP - General (Internal Medicine)  Javier Reilly OD as Consulting Physician (Optometry)  iAden Zee OD as Consulting Physician (Ophthalmology)  Mary Bojorquez III, MD as Consulting Physician (Orthopedic Surgery)  [facility] (DME Provider)    ROS  Review of Systems   Constitutional: Negative for chills, fatigue, fever and unexpected weight change.   HENT: Positive for dental problem. Negative for congestion, ear discharge, ear pain and postnasal drip.    Eyes: Negative for photophobia, pain and visual disturbance.   Respiratory: Negative for cough, shortness of breath and wheezing.    Cardiovascular: Negative for chest pain, palpitations and leg swelling.   Gastrointestinal: Negative for abdominal pain, constipation, diarrhea, nausea and vomiting.   Genitourinary: Negative for dysuria, frequency, urgency and vaginal discharge.   Musculoskeletal: Negative for back pain, joint swelling and neck stiffness.   Skin: Negative for rash.   Neurological: Negative for weakness and headaches.   Psychiatric/Behavioral: " "Negative for dysphoric mood and sleep disturbance. The patient is not nervous/anxious.            Physical Exam  Vitals:    06/23/22 0935   BP: (!) 138/56   Pulse: 89   Temp: 98.3 °F (36.8 °C)   TempSrc: Oral   SpO2: 99%   Weight: 81 kg (178 lb 9.2 oz)   Height: 5' 9" (1.753 m)    Body mass index is 26.37 kg/m².  Weight: 81 kg (178 lb 9.2 oz)   Height: 5' 9" (175.3 cm)     Physical Exam  Constitutional:       Appearance: She is well-developed.   HENT:      Head: Normocephalic and atraumatic.      Right Ear: External ear normal.      Left Ear: External ear normal.      Nose: Nose normal.      Mouth/Throat:      Dentition: Abnormal dentition. Dental tenderness, gingival swelling and dental caries present.   Eyes:      Conjunctiva/sclera: Conjunctivae normal.      Pupils: Pupils are equal, round, and reactive to light.   Neck:      Thyroid: No thyromegaly.   Cardiovascular:      Rate and Rhythm: Normal rate and regular rhythm.   Pulmonary:      Effort: Pulmonary effort is normal.      Breath sounds: Normal breath sounds. No wheezing.   Abdominal:      General: Bowel sounds are normal.      Palpations: Abdomen is soft. There is no hepatomegaly or splenomegaly.      Tenderness: There is no abdominal tenderness.   Genitourinary:     Vagina: Normal. No vaginal discharge.   Musculoskeletal:         General: Normal range of motion.      Cervical back: Normal range of motion and neck supple.   Lymphadenopathy:      Cervical: No cervical adenopathy.   Skin:     General: Skin is warm and dry.      Findings: No rash.   Neurological:      Mental Status: She is alert and oriented to person, place, and time.      Cranial Nerves: No cranial nerve deficit.   Psychiatric:         Behavior: Behavior normal.         Thought Content: Thought content normal.         Judgment: Judgment normal.         Health Maintenance       Date Due Completion Date    COVID-19 Vaccine (1) Never done ---    Shingles Vaccine (1 of 2) Never done ---    DEXA " Scan 12/08/2018 12/8/2016    Mammogram 03/26/2019 3/26/2018    Eye Exam 03/12/2022 3/12/2021    Foot Exam 06/01/2022 6/1/2021    Override on 11/18/2016: Done (with Podiatry)    Override on 5/19/2016: Done    Hemoglobin A1c 08/26/2022 5/26/2022    Influenza Vaccine (Season Ended) 09/01/2022 ---    Lipid Panel 10/22/2022 10/22/2021    Colorectal Cancer Screening 11/07/2022 11/7/2012 (Done)    Override on 11/7/2012: Done (Polyp found)    Diabetes Urine Screening 02/11/2023 2/11/2022    TETANUS VACCINE 05/28/2031 5/28/2021      Health maintenance reviewed at this time.     Assessment & Plan  Hospital discharge follow-up  The patient has been doing well since discharge.     Hypertensive emergency/Hypertensive encephalopathy  Blood pressure controlled at this time.  The current medical regimen is effective;  continue present plan and medications.    Uncontrolled type 2 diabetes mellitus with hyperosmolar nonketotic hyperglycemia  -     Ambulatory referral/consult to Endocrinology; Future; Expected date: 06/30/2022    Diastolic dysfunction with chronic heart failure/Pulmonary hypertension/Coronary artery disease of native artery of native heart with stable angina pectoris  The patient needs to follow up with cardiology. Has not been to cardiology since 2020.     Hyperlipidemia, unspecified hyperlipidemia type  The current medical regimen is effective;  continue present plan and medications.  The patient is asked to make an attempt to improve diet and exercise patterns to aid in medical management of this problem.    Status post tooth extraction  The patient will schedule an appointment with the dentist ASAP.         Follow-up: Follow up in about 2 months (around 9/6/2022).

## 2022-09-06 ENCOUNTER — PATIENT OUTREACH (OUTPATIENT)
Dept: ADMINISTRATIVE | Facility: HOSPITAL | Age: 72
End: 2022-09-06
Payer: MEDICARE

## 2022-09-06 ENCOUNTER — OFFICE VISIT (OUTPATIENT)
Dept: FAMILY MEDICINE | Facility: CLINIC | Age: 72
End: 2022-09-06
Payer: MEDICARE

## 2022-09-06 VITALS
BODY MASS INDEX: 26.19 KG/M2 | DIASTOLIC BLOOD PRESSURE: 60 MMHG | HEIGHT: 69 IN | SYSTOLIC BLOOD PRESSURE: 120 MMHG | WEIGHT: 176.81 LBS | TEMPERATURE: 98 F | OXYGEN SATURATION: 98 % | HEART RATE: 72 BPM

## 2022-09-06 DIAGNOSIS — M17.0 PRIMARY OSTEOARTHRITIS OF BOTH KNEES: ICD-10-CM

## 2022-09-06 DIAGNOSIS — Z74.09 MOBILITY IMPAIRED: Primary | ICD-10-CM

## 2022-09-06 DIAGNOSIS — D50.9 IRON DEFICIENCY ANEMIA, UNSPECIFIED IRON DEFICIENCY ANEMIA TYPE: ICD-10-CM

## 2022-09-06 DIAGNOSIS — E11.00 UNCONTROLLED TYPE 2 DIABETES MELLITUS WITH HYPEROSMOLAR NONKETOTIC HYPERGLYCEMIA: ICD-10-CM

## 2022-09-06 DIAGNOSIS — I25.118 CORONARY ARTERY DISEASE OF NATIVE ARTERY OF NATIVE HEART WITH STABLE ANGINA PECTORIS: ICD-10-CM

## 2022-09-06 PROCEDURE — 3074F PR MOST RECENT SYSTOLIC BLOOD PRESSURE < 130 MM HG: ICD-10-PCS | Mod: CPTII,S$GLB,, | Performed by: INTERNAL MEDICINE

## 2022-09-06 PROCEDURE — 1125F PR PAIN SEVERITY QUANTIFIED, PAIN PRESENT: ICD-10-PCS | Mod: CPTII,S$GLB,, | Performed by: INTERNAL MEDICINE

## 2022-09-06 PROCEDURE — 3062F POS MACROALBUMINURIA REV: CPT | Mod: CPTII,S$GLB,, | Performed by: INTERNAL MEDICINE

## 2022-09-06 PROCEDURE — 1101F PT FALLS ASSESS-DOCD LE1/YR: CPT | Mod: CPTII,S$GLB,, | Performed by: INTERNAL MEDICINE

## 2022-09-06 PROCEDURE — 1159F PR MEDICATION LIST DOCUMENTED IN MEDICAL RECORD: ICD-10-PCS | Mod: CPTII,S$GLB,, | Performed by: INTERNAL MEDICINE

## 2022-09-06 PROCEDURE — 3288F PR FALLS RISK ASSESSMENT DOCUMENTED: ICD-10-PCS | Mod: CPTII,S$GLB,, | Performed by: INTERNAL MEDICINE

## 2022-09-06 PROCEDURE — 3078F DIAST BP <80 MM HG: CPT | Mod: CPTII,S$GLB,, | Performed by: INTERNAL MEDICINE

## 2022-09-06 PROCEDURE — 3062F PR POS MACROALBUMINURIA RESULT DOCUMENTED/REVIEW: ICD-10-PCS | Mod: CPTII,S$GLB,, | Performed by: INTERNAL MEDICINE

## 2022-09-06 PROCEDURE — 99214 PR OFFICE/OUTPT VISIT, EST, LEVL IV, 30-39 MIN: ICD-10-PCS | Mod: S$GLB,,, | Performed by: INTERNAL MEDICINE

## 2022-09-06 PROCEDURE — 3008F PR BODY MASS INDEX (BMI) DOCUMENTED: ICD-10-PCS | Mod: CPTII,S$GLB,, | Performed by: INTERNAL MEDICINE

## 2022-09-06 PROCEDURE — 1101F PR PT FALLS ASSESS DOC 0-1 FALLS W/OUT INJ PAST YR: ICD-10-PCS | Mod: CPTII,S$GLB,, | Performed by: INTERNAL MEDICINE

## 2022-09-06 PROCEDURE — 3074F SYST BP LT 130 MM HG: CPT | Mod: CPTII,S$GLB,, | Performed by: INTERNAL MEDICINE

## 2022-09-06 PROCEDURE — 3288F FALL RISK ASSESSMENT DOCD: CPT | Mod: CPTII,S$GLB,, | Performed by: INTERNAL MEDICINE

## 2022-09-06 PROCEDURE — 3046F HEMOGLOBIN A1C LEVEL >9.0%: CPT | Mod: CPTII,S$GLB,, | Performed by: INTERNAL MEDICINE

## 2022-09-06 PROCEDURE — 99999 PR PBB SHADOW E&M-EST. PATIENT-LVL III: CPT | Mod: PBBFAC,,, | Performed by: INTERNAL MEDICINE

## 2022-09-06 PROCEDURE — 3046F PR MOST RECENT HEMOGLOBIN A1C LEVEL > 9.0%: ICD-10-PCS | Mod: CPTII,S$GLB,, | Performed by: INTERNAL MEDICINE

## 2022-09-06 PROCEDURE — 99214 OFFICE O/P EST MOD 30 MIN: CPT | Mod: S$GLB,,, | Performed by: INTERNAL MEDICINE

## 2022-09-06 PROCEDURE — 1159F MED LIST DOCD IN RCRD: CPT | Mod: CPTII,S$GLB,, | Performed by: INTERNAL MEDICINE

## 2022-09-06 PROCEDURE — 3078F PR MOST RECENT DIASTOLIC BLOOD PRESSURE < 80 MM HG: ICD-10-PCS | Mod: CPTII,S$GLB,, | Performed by: INTERNAL MEDICINE

## 2022-09-06 PROCEDURE — 3066F NEPHROPATHY DOC TX: CPT | Mod: CPTII,S$GLB,, | Performed by: INTERNAL MEDICINE

## 2022-09-06 PROCEDURE — 3066F PR DOCUMENTATION OF TREATMENT FOR NEPHROPATHY: ICD-10-PCS | Mod: CPTII,S$GLB,, | Performed by: INTERNAL MEDICINE

## 2022-09-06 PROCEDURE — 3008F BODY MASS INDEX DOCD: CPT | Mod: CPTII,S$GLB,, | Performed by: INTERNAL MEDICINE

## 2022-09-06 PROCEDURE — 1125F AMNT PAIN NOTED PAIN PRSNT: CPT | Mod: CPTII,S$GLB,, | Performed by: INTERNAL MEDICINE

## 2022-09-06 PROCEDURE — 99999 PR PBB SHADOW E&M-EST. PATIENT-LVL III: ICD-10-PCS | Mod: PBBFAC,,, | Performed by: INTERNAL MEDICINE

## 2022-09-06 RX ORDER — DULAGLUTIDE 1.5 MG/.5ML
1.5 INJECTION, SOLUTION SUBCUTANEOUS
Qty: 4 PEN | Refills: 2 | Status: SHIPPED | OUTPATIENT
Start: 2022-09-06 | End: 2022-10-06

## 2022-09-06 NOTE — PROGRESS NOTES
"Subjective:        Chief Complaint  Chief Complaint   Patient presents with    Diabetes     Follow up     HPI  Lorena Contreras is a 71 y.o. female with multiple medical diagnoses as listed in the medical history and problem list that presents for above complaint(s).       DM  Last A1c >14 in May 2022  Concerned about hypoglycemia - does not typically take her insulin at nighttime - she had a an episode of hypoglycemia durin gthe daytime with symptoms   Taking Metformin   States her appetite is 'off'  Having trouble with chewing due to canker sore at this time      The following sections were updated/reviewed and documented in the electronic medical record: Past Medical History, Past Surgical History, Social History, Family History, Allergies and Medications    Review of Systems   Constitutional: Negative for appetite change, chills, fever and unexpected weight change.   Respiratory: Negative for cough and shortness of breath.    Cardiovascular: Negative for chest pain, palpitations and leg swelling.   Gastrointestinal: Negative for abdominal pain, constipation, diarrhea, nausea and vomiting.   Musculoskeletal: Negative.    Skin: Positive for rash (mouth - canker sore on tongue)  Neurological:  Negative for light-headedness and headaches.   Hematological: Bruises/bleeds easily.   Psychiatric/Behavioral: Negative for dysphoric mood. The patient is not nervous/anxious.          Objective:     Physical Exam  Vitals:    05/26/22 0944   BP: 134/66   Pulse: 67   Temp: 98.7 °F (37.1 °C)   TempSrc: Oral   SpO2: 97%   Weight: 78.9 kg (174 lb 0.9 oz)   Height: 5' 9" (1.753 m)    Body mass index is 25.7 kg/m².  Weight: 78.9 kg (174 lb 0.9 oz)   Height: 5' 9" (175.3 cm)   Physical Exam  Vitals reviewed.   Constitutional:       General: She is not in acute distress.     Appearance: She is well-developed.   HENT:      Head: Normocephalic and atraumatic.   Eyes:      Conjunctiva/sclera: Conjunctivae normal.      Pupils: Pupils " are equal, round, and reactive to light.   Neck:      Thyroid: No thyromegaly.   Cardiovascular:      Rate and Rhythm: Normal rate.      Heart sounds: Murmur (I/VI systolic) heard.   Pulmonary:      Effort: Pulmonary effort is normal.      Breath sounds: Normal breath sounds.   Musculoskeletal:         General: No deformity.      Cervical back: Normal range of motion and neck supple.   Lymphadenopathy:      Cervical: No cervical adenopathy.   Skin:     General: Skin is warm and dry.   Neurological:      Mental Status: She is alert.      Cranial Nerves: No cranial nerve deficit.   Psychiatric:         Behavior: Behavior normal.         Assessment:     1. Chronic anemia    2. Anxiety    3. Uncontrolled type 2 diabetes mellitus with hyperglycemia, with long-term current use of insulin    4. Coronary artery disease of native artery of native heart with stable angina pectoris    5. Iron deficiency anemia, unspecified iron deficiency anemia type    6. Hypertension associated with diabetes    7. Gastroesophageal reflux disease, unspecified whether esophagitis present    8. Other disturbances of skin sensation     9. Dizziness    10. Polyneuropathy      Plan:     Lorena was seen today for establish care.    Diagnoses and all orders for this visit:    Uncontrolled type 2 diabetes mellitus with diabetic polyneuropathy, with long-term current use of insulin  Proliferative diabetic retinopathy of left eye associated with type 2 diabetes mellitus, unspecified proliferative retinopathy type  CKD stage 3 secondary to diabetes  Last A1c >14%, recent ED visit for hyperglycemia  Patient currently on Novolog 70/30 mix 30 units BID prior to meals - notes intermittent adherence  Discussed current pharmacotherapy   Start GLP1a (Trulicity)   Monitor closely for hypoglycemia, instructions provided  To see Endo in Oct 2022    Chronic anemia  Due to iron deficiency - on ferrous sulfate orally  Repeat CBC/iron indices       Hypertension  associated with diabetes  BP goal less than 130/80 - at goal today  Continue current medications    Coronary artery disease of native artery of native heart with stable angina pectoris  Aortic atherosclerosis  Discussed risk factor modification i.e. controlled type 2 diabetes as well as attenuation of statin therapy  Continue ASA  Continue ticagrelor  Continue f/u with Cardiology       Health Maintenance reviewed, addressed as per orders    F/u in 3 months        1. The patient indicates understanding of these issues and agrees with the plan. Brief care plan is updated and reviewed with the patient as applicable.   2. The patient is given an After Visit Summary that lists all medications with directions, allergies, orders placed during this encounter and follow-up instructions.   3. I have reviewed the patient's medical information including past medical, family, and social history sections including the medications and allergies.   4. We discussed the patient's current medications. I reconciled the patient's medication list and prepared and supplied needed refills.       Jorge Paris MD  Internal Medicine-Pediatrics

## 2022-09-06 NOTE — LETTER
September 6, 2022      [facility]  [address]  [place] [address]  Phone: [phone]  Fax: [phone]       Patient: Lorena Contreras   YOB: 1950  Date of Visit: 09/06/2022    To Whom It May Concern:    Maggie Contreras  was at [facility] on 09/06/2022. The patient may benefit from a walk-in shower at this time due to her diagnosis of osteoarthritis of the knees causing significant mobility impairment. If she needs a prescription for this we can submit as necessary. If you have any questions or concerns, or if I can be of further assistance, please do not hesitate to contact me.    Sincerely,    Jorge Paris MD

## 2022-09-24 ENCOUNTER — HOSPITAL ENCOUNTER (EMERGENCY)
Facility: HOSPITAL | Age: 72
Discharge: HOME | End: 2022-09-25
Attending: STUDENT IN AN ORGANIZED HEALTH CARE EDUCATION/TRAINING PROGRAM
Payer: MEDICARE

## 2022-09-24 DIAGNOSIS — F41.9 ANXIETY: Primary | ICD-10-CM

## 2022-09-24 DIAGNOSIS — R73.9 HYPERGLYCEMIA: ICD-10-CM

## 2022-09-24 DIAGNOSIS — R07.9 CHEST PAIN: ICD-10-CM

## 2022-09-24 DIAGNOSIS — R03.0 ELEVATED BLOOD PRESSURE READING: ICD-10-CM

## 2022-09-24 LAB
ALBUMIN SERPL BCP-MCNC: 3.5 G/DL (ref 3.5–5.2)
ALLENS TEST: ABNORMAL
ALP SERPL-CCNC: 149 U/L (ref 55–135)
ALT SERPL W/O P-5'-P-CCNC: 25 U/L (ref 10–44)
ANION GAP SERPL CALC-SCNC: 14 MMOL/L (ref 8–16)
AST SERPL-CCNC: 39 U/L (ref 10–40)
B-OH-BUTYR BLD STRIP-SCNC: 0.2 MMOL/L (ref 0–0.5)
BASOPHILS # BLD AUTO: 0.07 K/UL (ref 0–0.2)
BASOPHILS NFR BLD: 0.6 % (ref 0–1.9)
BILIRUB SERPL-MCNC: 0.3 MG/DL (ref 0.1–1)
BNP SERPL-MCNC: 110 PG/ML (ref 0–99)
BUN SERPL-MCNC: 25 MG/DL (ref 8–23)
CALCIUM SERPL-MCNC: 10.1 MG/DL (ref 8.7–10.5)
CHLORIDE SERPL-SCNC: 99 MMOL/L (ref 95–110)
CO2 SERPL-SCNC: 20 MMOL/L (ref 23–29)
CREAT SERPL-MCNC: 1.1 MG/DL (ref 0.5–1.4)
D DIMER PPP IA.FEU-MCNC: 0.38 MG/L FEU
DELSYS: ABNORMAL
DIFFERENTIAL METHOD: ABNORMAL
EOSINOPHIL # BLD AUTO: 0.3 K/UL (ref 0–0.5)
EOSINOPHIL NFR BLD: 2.2 % (ref 0–8)
ERYTHROCYTE [DISTWIDTH] IN BLOOD BY AUTOMATED COUNT: 12.7 % (ref 11.5–14.5)
EST. GFR  (NO RACE VARIABLE): 54 ML/MIN/1.73 M^2
FIO2: 21
GLUCOSE SERPL-MCNC: 324 MG/DL (ref 70–110)
HCO3 UR-SCNC: 24.8 MMOL/L (ref 24–28)
HCT VFR BLD AUTO: 38.6 % (ref 37–48.5)
HGB BLD-MCNC: 13.1 G/DL (ref 12–16)
IMM GRANULOCYTES # BLD AUTO: 0.1 K/UL (ref 0–0.04)
IMM GRANULOCYTES NFR BLD AUTO: 0.8 % (ref 0–0.5)
LYMPHOCYTES # BLD AUTO: 1.9 K/UL (ref 1–4.8)
LYMPHOCYTES NFR BLD: 15 % (ref 18–48)
MCH RBC QN AUTO: 28.8 PG (ref 27–31)
MCHC RBC AUTO-ENTMCNC: 33.9 G/DL (ref 32–36)
MCV RBC AUTO: 85 FL (ref 82–98)
MODE: ABNORMAL
MONOCYTES # BLD AUTO: 0.8 K/UL (ref 0.3–1)
MONOCYTES NFR BLD: 6.3 % (ref 4–15)
NEUTROPHILS # BLD AUTO: 9.4 K/UL (ref 1.8–7.7)
NEUTROPHILS NFR BLD: 75.1 % (ref 38–73)
NRBC BLD-RTO: 0 /100 WBC
PCO2 BLDA: 35.4 MMHG (ref 35–45)
PH SMN: 7.45 [PH] (ref 7.35–7.45)
PLATELET # BLD AUTO: 210 K/UL (ref 150–450)
PLATELET BLD QL SMEAR: ABNORMAL
PMV BLD AUTO: 12.9 FL (ref 9.2–12.9)
PO2 BLDA: 39 MMHG (ref 40–60)
POC BE: 1 MMOL/L
POC SATURATED O2: 77 % (ref 95–100)
POC TCO2: 26 MMOL/L (ref 24–29)
POCT GLUCOSE: 302 MG/DL (ref 70–110)
POTASSIUM SERPL-SCNC: ABNORMAL MMOL/L (ref 3.5–5.1)
PROT SERPL-MCNC: 8.2 G/DL (ref 6–8.4)
RBC # BLD AUTO: 4.55 M/UL (ref 4–5.4)
SAMPLE: ABNORMAL
SITE: ABNORMAL
SODIUM SERPL-SCNC: 133 MMOL/L (ref 136–145)
TROPONIN I SERPL DL<=0.01 NG/ML-MCNC: 0.01 NG/ML (ref 0–0.03)
WBC # BLD AUTO: 12.46 K/UL (ref 3.9–12.7)

## 2022-09-24 PROCEDURE — 25000003 PHARM REV CODE 250: Performed by: STUDENT IN AN ORGANIZED HEALTH CARE EDUCATION/TRAINING PROGRAM

## 2022-09-24 PROCEDURE — 82803 BLOOD GASES ANY COMBINATION: CPT

## 2022-09-24 PROCEDURE — 99900035 HC TECH TIME PER 15 MIN (STAT)

## 2022-09-24 PROCEDURE — 96374 THER/PROPH/DIAG INJ IV PUSH: CPT

## 2022-09-24 PROCEDURE — 84484 ASSAY OF TROPONIN QUANT: CPT | Performed by: STUDENT IN AN ORGANIZED HEALTH CARE EDUCATION/TRAINING PROGRAM

## 2022-09-24 PROCEDURE — 85379 FIBRIN DEGRADATION QUANT: CPT | Performed by: STUDENT IN AN ORGANIZED HEALTH CARE EDUCATION/TRAINING PROGRAM

## 2022-09-24 PROCEDURE — 93010 ELECTROCARDIOGRAM REPORT: CPT | Mod: 76,,, | Performed by: INTERNAL MEDICINE

## 2022-09-24 PROCEDURE — 85025 COMPLETE CBC W/AUTO DIFF WBC: CPT | Performed by: STUDENT IN AN ORGANIZED HEALTH CARE EDUCATION/TRAINING PROGRAM

## 2022-09-24 PROCEDURE — 93010 ELECTROCARDIOGRAM REPORT: CPT | Mod: ,,, | Performed by: INTERNAL MEDICINE

## 2022-09-24 PROCEDURE — 93005 ELECTROCARDIOGRAM TRACING: CPT

## 2022-09-24 PROCEDURE — 83880 ASSAY OF NATRIURETIC PEPTIDE: CPT | Performed by: STUDENT IN AN ORGANIZED HEALTH CARE EDUCATION/TRAINING PROGRAM

## 2022-09-24 PROCEDURE — 80053 COMPREHEN METABOLIC PANEL: CPT | Performed by: STUDENT IN AN ORGANIZED HEALTH CARE EDUCATION/TRAINING PROGRAM

## 2022-09-24 PROCEDURE — 93010 EKG 12-LEAD: ICD-10-PCS | Mod: ,,, | Performed by: INTERNAL MEDICINE

## 2022-09-24 PROCEDURE — 82010 KETONE BODYS QUAN: CPT | Performed by: STUDENT IN AN ORGANIZED HEALTH CARE EDUCATION/TRAINING PROGRAM

## 2022-09-24 PROCEDURE — 82962 GLUCOSE BLOOD TEST: CPT

## 2022-09-24 PROCEDURE — 99285 EMERGENCY DEPT VISIT HI MDM: CPT | Mod: 25

## 2022-09-24 PROCEDURE — 63600175 PHARM REV CODE 636 W HCPCS: Performed by: STUDENT IN AN ORGANIZED HEALTH CARE EDUCATION/TRAINING PROGRAM

## 2022-09-24 RX ORDER — LORAZEPAM 2 MG/ML
0.5 INJECTION INTRAMUSCULAR
Status: COMPLETED | OUTPATIENT
Start: 2022-09-24 | End: 2022-09-24

## 2022-09-24 RX ORDER — NITROGLYCERIN 0.4 MG/1
0.4 TABLET SUBLINGUAL EVERY 5 MIN PRN
Status: DISCONTINUED | OUTPATIENT
Start: 2022-09-24 | End: 2022-09-25 | Stop reason: HOSPADM

## 2022-09-24 RX ORDER — ASPIRIN 325 MG
325 TABLET ORAL
Status: COMPLETED | OUTPATIENT
Start: 2022-09-24 | End: 2022-09-24

## 2022-09-24 RX ADMIN — LORAZEPAM 0.5 MG: 2 INJECTION INTRAMUSCULAR; INTRAVENOUS at 10:09

## 2022-09-24 RX ADMIN — ASPIRIN 325 MG ORAL TABLET 325 MG: 325 PILL ORAL at 10:09

## 2022-09-25 VITALS
RESPIRATION RATE: 11 BRPM | WEIGHT: 176 LBS | DIASTOLIC BLOOD PRESSURE: 65 MMHG | TEMPERATURE: 98 F | OXYGEN SATURATION: 100 % | BODY MASS INDEX: 26.07 KG/M2 | HEIGHT: 69 IN | SYSTOLIC BLOOD PRESSURE: 142 MMHG | HEART RATE: 85 BPM

## 2022-09-25 LAB — TROPONIN I SERPL DL<=0.01 NG/ML-MCNC: <0.006 NG/ML (ref 0–0.03)

## 2022-09-25 PROCEDURE — 84484 ASSAY OF TROPONIN QUANT: CPT | Performed by: STUDENT IN AN ORGANIZED HEALTH CARE EDUCATION/TRAINING PROGRAM

## 2022-09-25 PROCEDURE — 25000003 PHARM REV CODE 250: Performed by: STUDENT IN AN ORGANIZED HEALTH CARE EDUCATION/TRAINING PROGRAM

## 2022-09-25 RX ORDER — CLONIDINE HYDROCHLORIDE 0.1 MG/1
0.2 TABLET ORAL
Status: COMPLETED | OUTPATIENT
Start: 2022-09-25 | End: 2022-09-25

## 2022-09-25 RX ORDER — CLONIDINE HYDROCHLORIDE 0.1 MG/1
TABLET ORAL
Status: DISCONTINUED
Start: 2022-09-25 | End: 2022-09-25 | Stop reason: HOSPADM

## 2022-09-25 RX ADMIN — CLONIDINE HYDROCHLORIDE 0.2 MG: 0.1 TABLET ORAL at 03:09

## 2022-09-25 RX ADMIN — SODIUM CHLORIDE 500 ML: 0.9 INJECTION, SOLUTION INTRAVENOUS at 03:09

## 2022-09-25 NOTE — ED PROVIDER NOTES
Encounter Date: 9/24/2022    SCRIBE #1 NOTE: I, Megan Schultz, am scribing for, and in the presence of,  Francisco Porras. I have scribed the following portions of the note - Other sections scribed: HPI/ROS/PE.     History     Chief Complaint   Patient presents with    Chest Pain     Presents with upper chest pain unable to describe with onset yesterday and high blood pressure     Lorena Contreras is a 71 y.o. female, with a PMHx of CAD, HTN, HLD, DM, Anemia, who presents to the ED with central chest pain onset yesterday. Pt reports chest pain has been constant and is exacerbated by anxiety. Associated symptoms of nausea, diarrhea, LE edema, numbness in all fingers (onset this evening) and numbness in feet (chronic). Pt currently does not feel chest pain. No other exacerbating or alleviating factors. Patient denies cough, fever, chills, SOB, visual disturbance, paresthesia, dysuria, or other associated symptoms. This is the extent of the patient's complaints today in the Emergency Department.        The history is provided by the patient.   Review of patient's allergies indicates:   Allergen Reactions    Novolin 70/30 (semi-synthetic) Nausea And Vomiting     Severe vomiting on Relion 70/30    Shellfish containing products Swelling    Sulfa (sulfonamide antibiotics) Anaphylaxis    Talwin [pentazocine lactate] Anaphylaxis    Victoza [liraglutide] Nausea And Vomiting    Glipizide Nausea Only    Citric acid     Codeine     Influenza virus vaccines Hives    Iodine and iodide containing products Hives    Rituxan [rituximab] Hives    Zoloft [sertraline] Nausea And Vomiting     Past Medical History:   Diagnosis Date    Allergy     Altered mental status 06/19/2022    DYSARTHRIA, SPASTIC MOVEMENTS & DIFFICULTY SWALLOWING    Anemia     Anxiety     Arthritis     Cataract     both removed    Colon polyps     Coronary artery disease     Depression     Diabetes mellitus, type II     Disorder of kidney and ureter      Fibromyalgia     Follicular lymphoma     GERD (gastroesophageal reflux disease)     HTN (hypertension)     Hyperlipidemia     MI (myocardial infarction) 03/2019    Restless leg syndrome     Stroke      Past Surgical History:   Procedure Laterality Date    COLONOSCOPY  11/07/2012    Colon polyp found; repeat in 5 years    ELBOW SURGERY Right 2015    dislocation repair     ESOPHAGOGASTRODUODENOSCOPY  11/07/2012    atrophic gastritis, H pylori testing negative    INCISION AND DRAINAGE FOOT Right 6/2/2021    Procedure: INCISION AND DRAINAGE, FOOT, bone biopsy;  Surgeon: Quiana Penn DPM;  Location: [facility] OR;  Service: Podiatry;  Laterality: Right;    KNEE SURGERY Bilateral 2015    scoped    LEFT HEART CATHETERIZATION Left 3/29/2019    Procedure: Left heart cath;  Surgeon: Bladimir Barbosa MD;  Location: [facility] CATH LAB;  Service: Cardiology;  Laterality: Left;    LEFT HEART CATHETERIZATION Left 11/18/2019    Procedure: Left heart cath;  Surgeon: Karl Rico MD;  Location: [facility] CATH LAB;  Service: Cardiology;  Laterality: Left;    LEFT HEART CATHETERIZATION Left 1/8/2020    Procedure: Left heart cath, right radial, noon start;  Surgeon: Christos Monreal MD;  Location: [facility] CATH LAB;  Service: Cardiology;  Laterality: Left;  RN Pre Op 1-6-20.  To be admitted 1-7-20 sor Aspirin Disensitation    TONSILLECTOMY  1955    ULTRASOUND GUIDANCE  1/8/2020    Procedure: Ultrasound Guidance;  Surgeon: Christos Monreal MD;  Location: [facility] CATH LAB;  Service: Cardiology;;     Family History   Problem Relation Age of Onset    Cancer Mother         colon    Heart disease Mother     Cancer Father         lung    Lung cancer Brother     Diabetes Sister     Hypertension Sister     Allergy (severe) Daughter     No Known Problems Daughter     Stroke Neg Hx     Hyperlipidemia Neg Hx      Social History     Tobacco Use    Smoking status: Never    Smokeless tobacco: Never   Substance Use Topics    Alcohol use: Not Currently    Drug use:  Never     Review of Systems   Constitutional:  Negative for chills and fever.   HENT:  Negative for facial swelling and sore throat.    Eyes:  Negative for visual disturbance.   Respiratory:  Negative for cough and shortness of breath.    Cardiovascular:  Positive for chest pain (Central). Negative for palpitations.   Gastrointestinal:  Positive for diarrhea and nausea. Negative for abdominal pain and vomiting.   Genitourinary:  Negative for dysuria and hematuria.   Musculoskeletal:  Negative for back pain.   Skin:  Negative for rash.   Neurological:  Positive for numbness (in all fingers. In feet (chronic).). Negative for weakness and headaches.        Negative for paresthesia.   Hematological:  Does not bruise/bleed easily.   Psychiatric/Behavioral: Negative.       Physical Exam     Initial Vitals [09/24/22 2149]   BP Pulse Resp Temp SpO2   (!) 220/94 96 (!) 22 98.6 °F (37 °C) 99 %      MAP       --         Physical Exam    Nursing note and vitals reviewed.  Constitutional: She appears well-developed and well-nourished. She is not diaphoretic. No distress.   HENT:   Head: Normocephalic and atraumatic.   Right Ear: External ear normal.   Left Ear: External ear normal.   Nose: Nose normal.   Eyes: Conjunctivae are normal. No scleral icterus.   Neck: Neck supple. No tracheal deviation present.   Normal range of motion.  Cardiovascular:  Normal rate, regular rhythm and normal heart sounds.           Pulmonary/Chest: Breath sounds normal. No respiratory distress.   Abdominal: Abdomen is soft. Bowel sounds are normal. There is no abdominal tenderness.   Musculoskeletal:      Cervical back: Normal range of motion and neck supple.      Comments: No CVAT bilaterally.      Neurological: She is alert and oriented to person, place, and time. GCS eye subscore is 4. GCS verbal subscore is 5. GCS motor subscore is 6.   Skin: Skin is warm and dry.   Left ankle slightly more edematous than right ankle. No pitting edema.    Psychiatric: She has a normal mood and affect. Thought content normal.   Anxious.       ED Course   Procedures  Labs Reviewed   CBC W/ AUTO DIFFERENTIAL - Abnormal; Notable for the following components:       Result Value    Immature Granulocytes 0.8 (*)     Gran # (ANC) 9.4 (*)     Immature Grans (Abs) 0.10 (*)     Gran % 75.1 (*)     Lymph % 15.0 (*)     All other components within normal limits   COMPREHENSIVE METABOLIC PANEL - Abnormal; Notable for the following components:    Sodium 133 (*)     CO2 20 (*)     Glucose 324 (*)     BUN 25 (*)     Alkaline Phosphatase 149 (*)     eGFR 54 (*)     All other components within normal limits   B-TYPE NATRIURETIC PEPTIDE - Abnormal; Notable for the following components:     (*)     All other components within normal limits   POCT GLUCOSE - Abnormal; Notable for the following components:    POCT Glucose 302 (*)     All other components within normal limits   ISTAT PROCEDURE - Abnormal; Notable for the following components:    POC PH 7.453 (*)     POC PO2 39 (*)     POC SATURATED O2 77 (*)     All other components within normal limits   TROPONIN I   BETA - HYDROXYBUTYRATE, SERUM   D DIMER, QUANTITATIVE   TROPONIN I   POCT GLUCOSE, HAND-HELD DEVICE          Imaging Results              X-Ray Chest AP Portable (Final result)  Result time 09/25/22 00:12:44      Final result by Leatha Palomo MD (09/25/22 00:12:44)                   Impression:      No acute cardiopulmonary process identified.      Electronically signed by: Leatha Palomo MD  Date:    09/25/2022  Time:    00:12               Narrative:    EXAMINATION:  XR CHEST AP PORTABLE    CLINICAL HISTORY:  cp;    TECHNIQUE:  Single frontal view of the chest was performed.    COMPARISON:  06/19/2022.    FINDINGS:  Cardiac silhouette is normal in size.  Lungs are symmetrically expanded.  No evidence of focal consolidative process, pneumothorax, or significant pleural effusion.  No acute osseous abnormality  identified.                                       Medications   nitroGLYCERIN SL tablet 0.4 mg (has no administration in time range)   cloNIDine (CATAPRES) 0.1 MG tablet (has no administration in time range)   cloNIDine (CATAPRES) 0.1 MG tablet (has no administration in time range)   aspirin tablet 325 mg (325 mg Oral Given 9/24/22 2236)   lorazepam injection 0.5 mg (0.5 mg Intravenous Given 9/24/22 2247)   cloNIDine tablet 0.2 mg (0.2 mg Oral Given 9/25/22 0326)   sodium chloride 0.9% bolus 500 mL (500 mLs Intravenous New Bag 9/25/22 0322)     Medical Decision Making:   History:   Old Medical Records: I decided to obtain old medical records.  Independently Interpreted Test(s):   I have ordered and independently interpreted EKG Reading(s) - see summary below  Clinical Tests:   Lab Tests: Ordered and Reviewed  Radiological Study: Ordered and Reviewed  Medical Tests: Ordered and Reviewed        Scribe Attestation:   Scribe #1: I performed the above scribed service and the documentation accurately describes the services I performed. I attest to the accuracy of the note.      ED Course as of 09/25/22 0516   Sat Sep 24, 2022   2337 D-Dimer: 0.38  Doubt PE.  Will not investigate further. [CC]   2337 BNP(!): 110  Mildly elevated, near patient's baseline. [CC]   2337 Beta-Hydroxybutyrate: 0.2  Normal doubt ketoacidosis. [CC]   2337 POC PH(!): 7.453  Mildly alcohol hemic, doubt ketoacidosis. [CC]   2337 Hemoglobin: 13.1 [CC]   Sun Sep 25, 2022   0242 Troponin I: <0.006 [CC]   0242 On my initial re-evaluation patient noted resolution of pain or discomfort in the chest.  She noted it was likely anxiety.  she received 0.5 of Ativan with relief of all symptoms.  Delta troponins are negative.  D-dimer is normal.  EKG is nonischemic.  Doubt ACS.  Patient hyperglycemic without an elevated beta hydroxybutyrate.  Doubt DKA.  No leukocytosis.  Doubt anemia.  Hemoglobin is normal.  Platelets normal.  Counseled patient on the need to  follow-up and on proper return ER precautions.  Asymptomatic hypertension with anxiety. [CC]   7014 Patient blood pressure greatly improved.  Patient notes resolution of symptoms at bedside.  Strict return precautions given. I discussed with the patient/family the diagnosis, treatment plan, indications for return to the emergency department, and for expected follow-up. The patient/family verbalized an understanding. The patient/family is asked if there are any questions or concerns. We discuss the case, until all issues are addressed to the patient/family's satisfaction. Patient/family understands and is agreeable to the plan. Patient is stable and ready for discharge.      [CC]      ED Course User Index  [CC] Francisco Porras MD                   Clinical Impression:   Final diagnoses:  [R07.9] Chest pain  [F41.9] Anxiety (Primary)  [R03.0] Elevated blood pressure reading  [R73.9] Hyperglycemia        ED Disposition Condition    Discharge Stable        I, Francisco Porras MD, personally performed the services described in this documentation. All medical record entries made by the scribe were at my direction and in my presence. I have reviewed the chart and agree that the record reflects my personal performance and is accurate and complete.   ED Prescriptions    None       Follow-up Information       Follow up With Specialties Details Why Contact Info    Jorge Paris MD Internal Medicine, Pediatrics Schedule an appointment as soon as possible for a visit in 2 days  [address]  [place] [address]  [phone]      [facility] Emergency Dept Emergency Medicine Go to  If symptoms worsen [address] [facility] [address] [phone]             Francisco Porras MD  09/25/22 3501

## 2022-09-25 NOTE — ED NOTES
Pt states having chest tightness but no pain. NSR noted. MD Porras made aware. Will continue to monitor.

## 2022-09-25 NOTE — DISCHARGE INSTRUCTIONS
Thank you for coming to our Emergency Department today. It is important to remember that some problems or medical conditions are difficult to diagnose and may not be found during your Emergency Department visit.     Be sure to follow up with your primary care doctor and review all labs/imaging/tests that were performed during your ER visit with them. Some labs/tests may be outside of the normal range and require non-emergent follow-up and further investigation to help diagnose/exclude/prevent complications or other potentially serious medical conditions that were not addressed during your ER visit.    If you do not have a primary care doctor, you may contact the one listed on your discharge paperwork or you may also call the [facility] Appointment Desk at [phone] to schedule an appointment and establish care with one. It is important to your health that you have a primary care doctor.    Please take all medications as directed. All medications may potentially have side-effects and it is impossible to predict which medications may give you side-effects or what side-effects (if any) they will give you.. If you feel that you are having a negative effect or side-effect of any medication you should immediately stop taking them and seek medical attention. If you feel that you are having a life-threatening reaction call 911.    Return to the ER with any questions/concerns, new/concerning symptoms, worsening or failure to improve.     Do not drive, swim, climb to height, take a bath, operate heavy machinery, drink alcohol or take potentially sedating medications, sign any legal documents or make any important decisions for 24 hours if you have received any pain medications, sedatives or mood altering drugs during your ER visit or within 24 hours of taking them if they have been prescribed to you.     You can find additional resources for Dentists, hearing aids, durable medical equipment, low cost pharmacies and  other resources at https://[facility].org    BELOW THIS LINE ONLY APPLIES IF YOU HAVE A COVID TEST PENDING OR IF YOU HAVE BEEN DIAGNOSED WITH COVID:  Please access [facility] to review the results of your test. Until the results of your COVID test return, you should isolate yourself so as not to potentially spread illness to others.   If your COVID test returns positive, you should isolate yourself so as not to spread illness to others. After five full days, if you are feeling better and you have not had fever for 24 hours, you can return to your typical daily activities, but you must wear a mask around others for an additional 5 days.   If your COVID test returns negative and you are either unvaccinated or more than six months out from your two-dose vaccine and are not yet boosted, you should still quarantine for 5 full days followed by strict mask use for an additional 5 full days.   If your COVID test returns negative and you have received your 2-dose initial vaccine as well as a booster, you should continue strict mask use for 10 full days after the exposure.  For all those exposed, best practice includes a test at day 5 after the exposure. This can be a home test or a test through one of the many testing centers throughout our community.   Masking is always advised to limit the spread of COVID. Cdc.gov is an excellent site to obtain the latest up to date recommendations regarding COVID and COVID testing.     CDC Testing and Quarantine Guidelines for patients with exposure to a known-positive COVID-19 person:  A close exposure is defined as anyone who has had an exposure (masked or unmasked) to a known COVID -19 positive person within 6 feet of someone for a cumulative total of 15 minutes or more over a 24-hour period.   Vaccinated and/or if you recently had a positive covid test within 90 days do NOT need to quarantine after contact with someone who had COVID-19 unless you develop symptoms.   Fully vaccinated  people who have not had a positive test within 90 days, should get tested 3-5 days after their exposure, even if they don't have symptoms and wear a mask indoors in public for 14 days following exposure or until their test result is negative.      Unvaccinated and/or NOT had a positive test within 90 days and meet close exposure  You are required by CDC guidelines to quarantine for at least 5 days from time of exposure followed by 5 days of strict masking. It is recommended, but not required to test after 5 days, unless you develop symptoms, in which case you should test at that time.  If you get tested after 5 days and your test is positive, your 5 day period of isolation starts the day of the positive test.    If your exposure does not meet the above definition, you can return to your normal daily activities to include social distancing, wearing a mask and frequent handwashing.      Here is a link to guidance from the CDC:  https://www.cdc.gov/media/releases/2021/s1227-isolation-quarantine-guidance.html      [place] Dept Of Health Testing Sites:  https://ldh.la.gov/page/3934      [facility] website with testing locations and guidance:  [email or web address]

## 2022-09-25 NOTE — ED TRIAGE NOTES
Pt BIB family from home with C/O chest pain  since yesterday. Pt states nausea, H/A, dizziness denies vomiting, fever, chills, visual changes or SOB. Pt reports mid sternal 3/10 non radiating intermittent pressure pain and anxiety aggravated by movement. PMHx DM, HTN, CAD, and anxiety.   Pt is AAOx4, NAD, VS as charted, breathing even and unlabored.

## 2022-09-25 NOTE — PROGRESS NOTES
Latest Reference Range & Units 09/24/22 22:46   Sample  VENOUS   POC PH 7.35 - 7.45  7.453 (H)   POC PCO2 35 - 45 mmHg 35.4   POC PO2 40 - 60 mmHg 39 (L)   POC HCO3 24 - 28 mmol/L 24.8   POC TCO2 24 - 29 mmol/L 26   POC SATURATED O2 95 - 100 % 77 (L)   Allens Test  N/A   POC BE -2 to 2 mmol/L 1   FiO2  21   DelSys  Room Air   Site  Other   Mode  SPONT   (H): Data is abnormally high  (L): Data is abnormally low

## 2022-09-26 ENCOUNTER — TELEPHONE (OUTPATIENT)
Dept: FAMILY MEDICINE | Facility: CLINIC | Age: 72
End: 2022-09-26
Payer: MEDICARE

## 2022-09-26 NOTE — TELEPHONE ENCOUNTER
----- Message from Ирина Rincon sent at 9/26/2022 12:18 PM CDT -----  Regarding: self [phone]  Type:  Sooner Appointment Request    Patient is requesting a sooner appointment.  Patient declined first available appointment listed as well as another facility and provider .  Patient will not accept being placed on the waitlist and is requesting a message be sent to doctor.    Name of Caller: self    When is the first available appointment? NA    Symptoms: ER f/u stated the ER told her to f/u with provider in 2 days. She was released on Sunday.     Would the patient rather a call back or a response via [facility]? Call back    Best Call Back Number:  [phone]

## 2022-09-26 NOTE — TELEPHONE ENCOUNTER
Patient states she went to ED on yesterday bp was 174/77 and patient has anxiety because of this ,no appointments with any provider. Please advise further

## 2022-10-03 ENCOUNTER — TELEPHONE (OUTPATIENT)
Dept: CARDIOLOGY | Facility: CLINIC | Age: 72
End: 2022-10-03
Payer: MEDICARE

## 2022-10-03 NOTE — TELEPHONE ENCOUNTER
----- Message from Cynthia Pugh sent at 9/30/2022  1:27 PM CDT -----  .Type:  Sooner Appointment Request    Patient is requesting a sooner appointment.  Patient declined first available appointment listed as well as another facility and provider .  Patient will not accept being placed on the waitlist and is requesting a message be sent to doctor.    Name of Caller: self    When is the first available appointment? 11/16    Symptoms: went to ER with rapid heartbeat. Pt blood pressure was 228/104 and 177/77 when she was sent home.     Would the patient rather a call back or a response via [facility]? call    Best Call Back Number: .[phone] (home)

## 2022-10-11 ENCOUNTER — PATIENT MESSAGE (OUTPATIENT)
Dept: ADMINISTRATIVE | Facility: HOSPITAL | Age: 72
End: 2022-10-11
Payer: MEDICARE

## 2022-10-20 ENCOUNTER — OFFICE VISIT (OUTPATIENT)
Dept: ENDOCRINOLOGY | Facility: CLINIC | Age: 72
End: 2022-10-20
Payer: MEDICARE

## 2022-10-20 VITALS
BODY MASS INDEX: 25.25 KG/M2 | TEMPERATURE: 98 F | WEIGHT: 171 LBS | HEART RATE: 94 BPM | DIASTOLIC BLOOD PRESSURE: 76 MMHG | SYSTOLIC BLOOD PRESSURE: 156 MMHG

## 2022-10-20 DIAGNOSIS — R30.0 DYSURIA: ICD-10-CM

## 2022-10-20 DIAGNOSIS — Z86.73 HISTORY OF TIA (TRANSIENT ISCHEMIC ATTACK): ICD-10-CM

## 2022-10-20 DIAGNOSIS — E11.00 UNCONTROLLED TYPE 2 DIABETES MELLITUS WITH HYPEROSMOLAR NONKETOTIC HYPERGLYCEMIA: Primary | ICD-10-CM

## 2022-10-20 DIAGNOSIS — I25.118 CORONARY ARTERY DISEASE OF NATIVE ARTERY OF NATIVE HEART WITH STABLE ANGINA PECTORIS: ICD-10-CM

## 2022-10-20 PROCEDURE — 99204 PR OFFICE/OUTPT VISIT, NEW, LEVL IV, 45-59 MIN: ICD-10-PCS | Mod: S$GLB,,, | Performed by: NURSE PRACTITIONER

## 2022-10-20 PROCEDURE — 99999 PR PBB SHADOW E&M-EST. PATIENT-LVL III: CPT | Mod: PBBFAC,,, | Performed by: NURSE PRACTITIONER

## 2022-10-20 PROCEDURE — 3062F PR POS MACROALBUMINURIA RESULT DOCUMENTED/REVIEW: ICD-10-PCS | Mod: CPTII,S$GLB,, | Performed by: NURSE PRACTITIONER

## 2022-10-20 PROCEDURE — 3066F PR DOCUMENTATION OF TREATMENT FOR NEPHROPATHY: ICD-10-PCS | Mod: CPTII,S$GLB,, | Performed by: NURSE PRACTITIONER

## 2022-10-20 PROCEDURE — 3078F PR MOST RECENT DIASTOLIC BLOOD PRESSURE < 80 MM HG: ICD-10-PCS | Mod: CPTII,S$GLB,, | Performed by: NURSE PRACTITIONER

## 2022-10-20 PROCEDURE — 99999 PR PBB SHADOW E&M-EST. PATIENT-LVL III: ICD-10-PCS | Mod: PBBFAC,,, | Performed by: NURSE PRACTITIONER

## 2022-10-20 PROCEDURE — 3077F PR MOST RECENT SYSTOLIC BLOOD PRESSURE >= 140 MM HG: ICD-10-PCS | Mod: CPTII,S$GLB,, | Performed by: NURSE PRACTITIONER

## 2022-10-20 PROCEDURE — 3046F PR MOST RECENT HEMOGLOBIN A1C LEVEL > 9.0%: ICD-10-PCS | Mod: CPTII,S$GLB,, | Performed by: NURSE PRACTITIONER

## 2022-10-20 PROCEDURE — 3077F SYST BP >= 140 MM HG: CPT | Mod: CPTII,S$GLB,, | Performed by: NURSE PRACTITIONER

## 2022-10-20 PROCEDURE — 99213 OFFICE O/P EST LOW 20 MIN: CPT | Mod: PBBFAC | Performed by: NURSE PRACTITIONER

## 2022-10-20 PROCEDURE — 3062F POS MACROALBUMINURIA REV: CPT | Mod: CPTII,S$GLB,, | Performed by: NURSE PRACTITIONER

## 2022-10-20 PROCEDURE — 3046F HEMOGLOBIN A1C LEVEL >9.0%: CPT | Mod: CPTII,S$GLB,, | Performed by: NURSE PRACTITIONER

## 2022-10-20 PROCEDURE — 99499 UNLISTED E&M SERVICE: CPT | Mod: HCNC,S$GLB,, | Performed by: NURSE PRACTITIONER

## 2022-10-20 PROCEDURE — 99204 OFFICE O/P NEW MOD 45 MIN: CPT | Mod: S$GLB,,, | Performed by: NURSE PRACTITIONER

## 2022-10-20 PROCEDURE — 3078F DIAST BP <80 MM HG: CPT | Mod: CPTII,S$GLB,, | Performed by: NURSE PRACTITIONER

## 2022-10-20 PROCEDURE — 99499 RISK ADDL DX/OHS AUDIT: ICD-10-PCS | Mod: HCNC,S$GLB,, | Performed by: NURSE PRACTITIONER

## 2022-10-20 PROCEDURE — 3066F NEPHROPATHY DOC TX: CPT | Mod: CPTII,S$GLB,, | Performed by: NURSE PRACTITIONER

## 2022-10-20 RX ORDER — GLIMEPIRIDE 2 MG/1
2 TABLET ORAL
Qty: 90 TABLET | Refills: 0 | Status: SHIPPED | OUTPATIENT
Start: 2022-10-20 | End: 2023-08-24

## 2022-10-20 RX ORDER — INSULIN DEGLUDEC 200 U/ML
INJECTION, SOLUTION SUBCUTANEOUS
Qty: 3 PEN | Refills: 1 | Status: SHIPPED | OUTPATIENT
Start: 2022-10-20 | End: 2023-06-23

## 2022-10-20 RX ORDER — INSULIN ASPART 100 [IU]/ML
INJECTION, SOLUTION INTRAVENOUS; SUBCUTANEOUS
Qty: 15 ML | Refills: 0 | Status: SHIPPED | OUTPATIENT
Start: 2022-10-20 | End: 2023-06-23 | Stop reason: SDUPTHER

## 2022-10-20 RX ORDER — DULAGLUTIDE 4.5 MG/.5ML
4.5 INJECTION, SOLUTION SUBCUTANEOUS
Qty: 4 PEN | Refills: 3 | Status: SHIPPED | OUTPATIENT
Start: 2022-10-20 | End: 2023-06-23

## 2022-10-20 NOTE — PROGRESS NOTES
CC: This 72 y.o. White female  is here for evaluation of  T2DM along with comorbidities indicated in the Visit Diagnosis section of this encounter.    HPI: Lorena Contreras was diagnosed with T2DM in 2010.   Medical hx: MI 3/2019, TIA  Cardiology - Dr. Barbosa     Initial visit 6/2019  Last seen by SCARLET Powell NP, in 11/2018. New to me.   She had an MI in 3/2019. Now on Brilinta. Has h/o falling into rx gap and unfortunately Medicaid has been stopped. She is looking into this.   Finds Januvia very effective. Takes it only if her BGs are in the 200s or more and BG will drop by 100 mg/dL. However she's scared to take januvia for high BGs at night d/t h/o overnight lows. No recent lows since hospital visit in March however.   Just had a fall on Thursday when her dog jumped and she tripped on her dress. Denies loss of consciousness or head injury. C/o left sided rib pain and bilateral leg pain.   Plan Strange insulin regimen - generally Januvia is not taken as needed. Also mixed insulin is usually not taken TID but BID. Surprised she does not experience lows after her first dose of insulin since she doesn't eat breakfast.   Increase Novolog 70/30 to 36 units before lunch. Try to avoid eating heavy meals - which can be hard when you eat out. So take 30-36-30 units three times a day.   Make sure to test blood sugars before meals and inject insulin before eating as well.   Continue to take januvia if BG > 200  Test blood sugar 3x/day.   Return to clinic in 3 months with labs prior.       Interval history arrives with sister Milton.   Pt seen by me for first time in 2019 and has been lost to f/u.   Most recent A1c had risen to  > 14% in May. Went to ED in June for hyperglycemia and again for hypertensive emergency. She had not taken her insulin x 1 year and resumed taking it after ED visit.   She was started on Trulicity by Dr. Paris her PCP in Sept. Denies n/v, constipation, or diarrhea.   Pt afraid to take insulin because  she fears hypoglycemia.     Finds that her BGs have improved with Trulicity, down form 400-500s to now 200s. But today, BG erick to 400s after eating yogurt and banana; no insulin yet today.     Believes she has UTI. Dysuria started today.     PMHX: CAD s/p MI 3/2019 s/p stent placement (presented to ER with numbness throughout her body)    LAST DIABETES EDUCATION: never     HOSPITALIZED FOR DIABETES OR RELATED COMPLICATIONS -  No.    PRESCRIBED DIABETES MEDICATIONS:   metformin 1000 mg bid  Trulicity 1.5 mg once weekly  Novolog 70/30 30 units BID ac       Misses medication doses - skips insulin in the morning because she doesn't eat breakfast    DM COMPLICATIONS: retinopathy, peripheral neuropathy, cardiovascular disease, and cerebrovascular disease    SIGNIFICANT DIABETES MED HISTORY:   glipizide dc'd d/t recurrent hypoglycemia; also had nausea   Unable to afford analog MDI   victoza - severe nausea and abdominal pain   Started mixed analog insulin 5/2017      SELF MONITORING BLOOD GLUCOSE: Checks blood glucose at home - infrequent d/t pain     HYPOGLYCEMIC EPISODES: none recent      CURRENT DIET: Doesn't generally eat breakfast. In the mornings, she usually just drinks coffee decaf.   Eats 2 meals/day, lunch 4 pm and dinner at 9 pm. First food intake is at 4 pm, no snacks until after dinner. Likes to eat 1/2 nutty butty after dinner.     Drinks unsweet tea, green tea without sugar.     Diet recall: dinner was fried rice with shrimp. Lunch was sandwich     CURRENT EXERCISE: none.       BP (!) 156/76   Pulse 94   Temp 98.3 °F (36.8 °C)   Wt 77.6 kg (171 lb)   BMI 25.25 kg/m²     ROS:   CONSTITUTIONAL: Appetite good, +  fatigue  SKIN: No rashes, pruritis   EYES: No visual disturbances.   RESPIRATORY: No shortness of breath,  or cough.   CARDIAC: + exertional chest pain, will see cardiologist   GI: No nausea or vomiting, diarrhea  : +  urinary frequency, + dysuria   MS: + arthralgias   NEURO: + paresthesias to  feet  Other: + dry mouth     PHYSICAL EXAM:  GENERAL: Well developed, well nourished. No acute distress.   PSYCH: AAOx3, appropriate mood and affect, conversant, well-groomed. Judgement and insight good.   NEURO: Cranial nerves grossly intact. Speech clear, no tremor.   NECK: Trachea midline, no thyromegaly or lymphadenopathy.   CHEST: Respirations even and unlabored. CTA bilaterally.  CARDIOVASCULAR: Regular rate and rhythm. No bruits. +  murmur. No edema.   ABDOMEN: Soft, non-tender, non-distended. Bowel sounds present.   MS: Gait steady. No clubbing.   SKIN: Normal skin turgor. Skin warm and dry. No areas of breakdown.   FEET:     Protective Sensation (w/ 10 gram monofilament):  Right: Decreased  Left: Decreased    Visual Inspection:  Skin Breakdown -  Neither    Pedal Pulses:   Right: Present  Left: Present    Posterior tibialis:   Right:Present  Left: Present        Hemoglobin A1C   Date Value Ref Range Status   05/26/2022 >14.0 (H) 4.0 - 5.6 % Final     Comment:     ADA Screening Guidelines:  5.7-6.4%  Consistent with prediabetes  >or=6.5%  Consistent with diabetes    High levels of fetal hemoglobin interfere with the HbA1C  assay. Heterozygous hemoglobin variants (HbS, HgC, etc)do  not significantly interfere with this assay.   However, presence of multiple variants may affect accuracy.     02/11/2022 9.9 (H) 4.0 - 5.6 % Final     Comment:     ADA Screening Guidelines:  5.7-6.4%  Consistent with prediabetes  >or=6.5%  Consistent with diabetes    High levels of fetal hemoglobin interfere with the HbA1C  assay. Heterozygous hemoglobin variants (HbS, HgC, etc)do  not significantly interfere with this assay.   However, presence of multiple variants may affect accuracy.     10/22/2021 13.8 (H) 4.0 - 5.6 % Final     Comment:     ADA Screening Guidelines:  5.7-6.4%  Consistent with prediabetes  >or=6.5%  Consistent with diabetes    High levels of fetal hemoglobin interfere with the HbA1C  assay. Heterozygous  hemoglobin variants (HbS, HgC, etc)do  not significantly interfere with this assay.   However, presence of multiple variants may affect accuracy.       No results found for: CPEPTIDE, GLUTAMICACID, ISLETCELLANT, FRUCTOSAMINE        Chemistry        Component Value Date/Time     (L) 09/24/2022 2235    K SEE COMMENT 09/24/2022 2235    CL 99 09/24/2022 2235    CO2 20 (L) 09/24/2022 2235    BUN 25 (H) 09/24/2022 2235    CREATININE 1.1 09/24/2022 2235     (H) 09/24/2022 2235        Component Value Date/Time    CALCIUM 10.1 09/24/2022 2235    ALKPHOS 149 (H) 09/24/2022 2235    AST 39 09/24/2022 2235    ALT 25 09/24/2022 2235    BILITOT 0.3 09/24/2022 2235    ESTGFRAFRICA 53 (A) 06/21/2022 0349    EGFRNONAA 46 (A) 06/21/2022 0349         Latest Reference Range & Units 09/24/22 22:35   BUN 8 - 23 mg/dL 25 (H)   Creatinine 0.5 - 1.4 mg/dL 1.1   eGFR >60 mL/min/1.73 m^2 54 !   (H): Data is abnormally high  !: Data is abnormal      Lab Results   Component Value Date    LDLCALC 64.2 10/22/2021       Lab Results   Component Value Date    MICALBCREAT 1992.9 (H) 02/11/2022         ASSESSMENT and PLAN:    A1C GOAL: < 7.5%     1. Uncontrolled type 2 diabetes mellitus with hyperosmolar nonketotic hyperglycemia  Labs today including urine to check for UTI     Increase Trulicity to 4.5 mg once weekly.   Potential side effects: nausea, diarrhea, constipation.     Stop Novolog 70/30.   Start Tresiba 22 units once daily at night.     Continue metformin twice daily.   Start glimepiride 2 mg once daily BEFORE first meal of the day.    Start Novolog as needed before meals:   Blood sugar 150 to 200, + 2 units  Blood sugar 201 to 250, + 4 units  Blood sugar 251 to 300, + 6 units  Blood sugar 301 to 350, + 8 units   Blood sugar greater than 350, + 10 units    Will send out Dexcom  Continuous Glucose Monitor (CGM) orders. Contact office to set up Diabetes Education training for Dexcom once it's shipped to home.     Call with any  issues, especially if having side effects, if blood sugars drop less than 90, or if no update received about Dexcom after 1-2 weeks.     Discussed regular footcare and inspection.     Return to clinic in 4-6 weeks.     Ambulatory referral/consult to Endocrinology    Glucose, Random    C-Peptide    dulaglutide (TRULICITY) 4.5 mg/0.5 mL pen injector    insulin degludec (TRESIBA FLEXTOUCH U-200) 200 unit/mL (3 mL) insulin pen    insulin aspart U-100 (NOVOLOG FLEXPEN U-100 INSULIN) 100 unit/mL (3 mL) InPn pen    glimepiride (AMARYL) 2 MG tablet      2. Dysuria  Urinalysis, Reflex to Urine Culture Urine, Clean Catch      3. History of TIA (transient ischemic attack)  Improve glycemic control.   Continue Trulicity       4. Coronary artery disease of native artery of native heart with stable angina pectoris  Continue Trulicity   Improve glycemic control.               Patient requires a therapeutic CGM and is willing to use therapeutic CGM/SMBG for Necessary frequent adjustments in insulin therapy. I have assessed adherence to CGM regimen and to the plan. Due to COVID pandemic, patient requires Dexcom CGM to manage diabetes.   Pt injects insulin 2x/day.       Orders Placed This Encounter   Procedures    Glucose, Random     Standing Status:   Future     Standing Expiration Date:   12/19/2023    C-Peptide     Standing Status:   Future     Standing Expiration Date:   12/19/2023    Urinalysis, Reflex to Urine Culture Urine, Clean Catch     Standing Status:   Future     Standing Expiration Date:   12/19/2023     Order Specific Question:   Preferred Collection Type     Answer:   Urine, Clean Catch     Order Specific Question:   Specimen Source     Answer:   Urine          Follow up in about 6 weeks (around 12/1/2022).

## 2022-10-20 NOTE — PATIENT INSTRUCTIONS
Labs today including urine to check for UTI     Increase Trulicity to 4.5 mg once weekly.   Potential side effects: nausea, diarrhea, constipation.     Stop Novolog 70/30.   Start Tresiba 22 units once daily at night.     Continue metformin twice daily.   Start glimepiride 2 mg once daily BEFORE first meal of the day.    Start Novolog as needed before meals:   Blood sugar 150 to 200, + 2 units  Blood sugar 201 to 250, + 4 units  Blood sugar 251 to 300, + 6 units  Blood sugar 301 to 350, + 8 units   Blood sugar greater than 350, + 10 units    Will send out Dexcom  Continuous Glucose Monitor (CGM) orders. Contact office to set up Diabetes Education training for Dexcom once it's shipped to home.     Call with any issues, especially if having side effects, if blood sugars drop less than 90, or if no update received about Dexcom after 1-2 weeks.     Return to clinic in 4-6 weeks.

## 2022-10-21 ENCOUNTER — LAB VISIT (OUTPATIENT)
Dept: LAB | Facility: HOSPITAL | Age: 72
End: 2022-10-21
Attending: NURSE PRACTITIONER
Payer: MEDICARE

## 2022-10-21 DIAGNOSIS — R30.0 DYSURIA: ICD-10-CM

## 2022-10-21 LAB
BACTERIA #/AREA URNS AUTO: ABNORMAL /HPF
BILIRUB UR QL STRIP: NEGATIVE
CLARITY UR REFRACT.AUTO: ABNORMAL
COLOR UR AUTO: YELLOW
GLUCOSE UR QL STRIP: NEGATIVE
HGB UR QL STRIP: NEGATIVE
KETONES UR QL STRIP: NEGATIVE
LEUKOCYTE ESTERASE UR QL STRIP: ABNORMAL
MICROSCOPIC COMMENT: ABNORMAL
NITRITE UR QL STRIP: NEGATIVE
PH UR STRIP: 6 [PH] (ref 5–8)
PROT UR QL STRIP: ABNORMAL
RBC #/AREA URNS AUTO: 1 /HPF (ref 0–4)
SP GR UR STRIP: 1.01 (ref 1–1.03)
SQUAMOUS #/AREA URNS AUTO: 0 /HPF
URN SPEC COLLECT METH UR: ABNORMAL
WBC #/AREA URNS AUTO: 3 /HPF (ref 0–5)

## 2022-10-21 PROCEDURE — 81001 URINALYSIS AUTO W/SCOPE: CPT | Performed by: NURSE PRACTITIONER

## 2022-10-25 ENCOUNTER — TELEPHONE (OUTPATIENT)
Dept: ENDOCRINOLOGY | Facility: CLINIC | Age: 72
End: 2022-10-25
Payer: MEDICARE

## 2022-10-26 DIAGNOSIS — E11.9 TYPE 2 DIABETES MELLITUS WITHOUT COMPLICATION: ICD-10-CM

## 2022-11-08 ENCOUNTER — PATIENT MESSAGE (OUTPATIENT)
Dept: ENDOCRINOLOGY | Facility: CLINIC | Age: 72
End: 2022-11-08
Payer: MEDICARE

## 2022-11-17 ENCOUNTER — PATIENT OUTREACH (OUTPATIENT)
Dept: ADMINISTRATIVE | Facility: HOSPITAL | Age: 72
End: 2022-11-17
Payer: MEDICARE

## 2022-11-18 ENCOUNTER — OFFICE VISIT (OUTPATIENT)
Dept: CARDIOLOGY | Facility: CLINIC | Age: 72
End: 2022-11-18
Payer: MEDICARE

## 2022-11-18 VITALS
DIASTOLIC BLOOD PRESSURE: 76 MMHG | HEART RATE: 85 BPM | HEIGHT: 69 IN | BODY MASS INDEX: 25.44 KG/M2 | OXYGEN SATURATION: 97 % | SYSTOLIC BLOOD PRESSURE: 188 MMHG | WEIGHT: 171.75 LBS | RESPIRATION RATE: 18 BRPM

## 2022-11-18 DIAGNOSIS — I50.32 DIASTOLIC DYSFUNCTION WITH CHRONIC HEART FAILURE: ICD-10-CM

## 2022-11-18 DIAGNOSIS — I15.2 HYPERTENSION ASSOCIATED WITH DIABETES: Primary | Chronic | ICD-10-CM

## 2022-11-18 DIAGNOSIS — E78.5 HYPERLIPIDEMIA, UNSPECIFIED HYPERLIPIDEMIA TYPE: ICD-10-CM

## 2022-11-18 DIAGNOSIS — E11.59 HYPERTENSION ASSOCIATED WITH DIABETES: Primary | Chronic | ICD-10-CM

## 2022-11-18 DIAGNOSIS — E11.51 PERIPHERAL VASCULAR DISEASE IN DIABETES MELLITUS: ICD-10-CM

## 2022-11-18 DIAGNOSIS — E11.00 UNCONTROLLED TYPE 2 DIABETES MELLITUS WITH HYPEROSMOLAR NONKETOTIC HYPERGLYCEMIA: ICD-10-CM

## 2022-11-18 DIAGNOSIS — I25.118 CORONARY ARTERY DISEASE OF NATIVE ARTERY OF NATIVE HEART WITH STABLE ANGINA PECTORIS: ICD-10-CM

## 2022-11-18 DIAGNOSIS — I51.7 CARDIOMEGALY: ICD-10-CM

## 2022-11-18 DIAGNOSIS — I70.0 AORTIC ATHEROSCLEROSIS: ICD-10-CM

## 2022-11-18 PROCEDURE — 1101F PT FALLS ASSESS-DOCD LE1/YR: CPT | Mod: CPTII,S$GLB,, | Performed by: INTERNAL MEDICINE

## 2022-11-18 PROCEDURE — 1126F AMNT PAIN NOTED NONE PRSNT: CPT | Mod: CPTII,S$GLB,, | Performed by: INTERNAL MEDICINE

## 2022-11-18 PROCEDURE — 3288F PR FALLS RISK ASSESSMENT DOCUMENTED: ICD-10-PCS | Mod: CPTII,S$GLB,, | Performed by: INTERNAL MEDICINE

## 2022-11-18 PROCEDURE — 3046F PR MOST RECENT HEMOGLOBIN A1C LEVEL > 9.0%: ICD-10-PCS | Mod: CPTII,S$GLB,, | Performed by: INTERNAL MEDICINE

## 2022-11-18 PROCEDURE — 3046F HEMOGLOBIN A1C LEVEL >9.0%: CPT | Mod: CPTII,S$GLB,, | Performed by: INTERNAL MEDICINE

## 2022-11-18 PROCEDURE — 3288F FALL RISK ASSESSMENT DOCD: CPT | Mod: CPTII,S$GLB,, | Performed by: INTERNAL MEDICINE

## 2022-11-18 PROCEDURE — 3062F PR POS MACROALBUMINURIA RESULT DOCUMENTED/REVIEW: ICD-10-PCS | Mod: CPTII,S$GLB,, | Performed by: INTERNAL MEDICINE

## 2022-11-18 PROCEDURE — 99214 OFFICE O/P EST MOD 30 MIN: CPT | Mod: S$GLB,,, | Performed by: INTERNAL MEDICINE

## 2022-11-18 PROCEDURE — 3008F BODY MASS INDEX DOCD: CPT | Mod: CPTII,S$GLB,, | Performed by: INTERNAL MEDICINE

## 2022-11-18 PROCEDURE — 1159F MED LIST DOCD IN RCRD: CPT | Mod: CPTII,S$GLB,, | Performed by: INTERNAL MEDICINE

## 2022-11-18 PROCEDURE — 3077F SYST BP >= 140 MM HG: CPT | Mod: CPTII,S$GLB,, | Performed by: INTERNAL MEDICINE

## 2022-11-18 PROCEDURE — 1101F PR PT FALLS ASSESS DOC 0-1 FALLS W/OUT INJ PAST YR: ICD-10-PCS | Mod: CPTII,S$GLB,, | Performed by: INTERNAL MEDICINE

## 2022-11-18 PROCEDURE — 1126F PR PAIN SEVERITY QUANTIFIED, NO PAIN PRESENT: ICD-10-PCS | Mod: CPTII,S$GLB,, | Performed by: INTERNAL MEDICINE

## 2022-11-18 PROCEDURE — 3008F PR BODY MASS INDEX (BMI) DOCUMENTED: ICD-10-PCS | Mod: CPTII,S$GLB,, | Performed by: INTERNAL MEDICINE

## 2022-11-18 PROCEDURE — 3078F PR MOST RECENT DIASTOLIC BLOOD PRESSURE < 80 MM HG: ICD-10-PCS | Mod: CPTII,S$GLB,, | Performed by: INTERNAL MEDICINE

## 2022-11-18 PROCEDURE — 3066F PR DOCUMENTATION OF TREATMENT FOR NEPHROPATHY: ICD-10-PCS | Mod: CPTII,S$GLB,, | Performed by: INTERNAL MEDICINE

## 2022-11-18 PROCEDURE — 3077F PR MOST RECENT SYSTOLIC BLOOD PRESSURE >= 140 MM HG: ICD-10-PCS | Mod: CPTII,S$GLB,, | Performed by: INTERNAL MEDICINE

## 2022-11-18 PROCEDURE — 99999 PR PBB SHADOW E&M-EST. PATIENT-LVL V: ICD-10-PCS | Mod: PBBFAC,,, | Performed by: INTERNAL MEDICINE

## 2022-11-18 PROCEDURE — 99214 PR OFFICE/OUTPT VISIT, EST, LEVL IV, 30-39 MIN: ICD-10-PCS | Mod: S$GLB,,, | Performed by: INTERNAL MEDICINE

## 2022-11-18 PROCEDURE — 3078F DIAST BP <80 MM HG: CPT | Mod: CPTII,S$GLB,, | Performed by: INTERNAL MEDICINE

## 2022-11-18 PROCEDURE — 3062F POS MACROALBUMINURIA REV: CPT | Mod: CPTII,S$GLB,, | Performed by: INTERNAL MEDICINE

## 2022-11-18 PROCEDURE — 3066F NEPHROPATHY DOC TX: CPT | Mod: CPTII,S$GLB,, | Performed by: INTERNAL MEDICINE

## 2022-11-18 PROCEDURE — 99999 PR PBB SHADOW E&M-EST. PATIENT-LVL V: CPT | Mod: PBBFAC,,, | Performed by: INTERNAL MEDICINE

## 2022-11-18 PROCEDURE — 1159F PR MEDICATION LIST DOCUMENTED IN MEDICAL RECORD: ICD-10-PCS | Mod: CPTII,S$GLB,, | Performed by: INTERNAL MEDICINE

## 2022-11-18 RX ORDER — VALSARTAN 160 MG/1
160 TABLET ORAL DAILY
Qty: 90 TABLET | Refills: 3 | Status: SHIPPED | OUTPATIENT
Start: 2022-11-18 | End: 2023-06-22

## 2022-11-18 NOTE — PROGRESS NOTES
Subjective:    Patient ID:  Lorena Contreras is a 72 y.o. female who presents for follow-up of Follow-up      HPI    CAD - JESIKA x 2 to RCA 3/29/19 - JESIKA to RI and Cx 1/8/20, HTN, HLD, DM     11/18/19 [facility] - EDP 16, LAD mid 50%, Cx long mid 80-90% - diffusely diseased - similar to [facility] 3/29/19, OM1 90% mid, RCA stents patent 50% beyond stents     Will review with interventional staff for possible out patient PCI - continue with medical Rx for now     Previously followed by Dr Barbosa - last seen 5/16/19  Patient is here for follow-up of coronary artery disease and ST-elevation MI.  She underwent PCI to the RCA.  She is known to have other blockages well for which she re-presented to the emergency department was admitted for observation for atypical chest pain.  She felt like it was anxiety related and she no longer has had any since discharge.  She underwent nuclear stress test which showed no significant ischemia.  She denies any other associated symptoms currently.  She has experienced no PND, orthopnea or lower extremity edema.  She denies any dizziness, presyncope or syncope.  She says she was given Atarax on discharge which has helped with her anxiety.     Her follow-up coronary artery disease and previous ST-elevation MI.  She is feeling anxious and feels like she has a panic attack today.  She again is somewhat tearful and says that her  is been feeling ill as well.  She says she was started on Prozac by her primary care physician but does not feel like this is doing much in terms of calming her down.  She still has some mild residual chest pain when she mostly feels anxious.  This is dissimilar to her previous angina symptoms.  She denies any other associated symptoms.  She denies any PND, orthopnea or lower extremity edema.  She denies any dizziness, presyncope or syncope.      [facility] 1/8/20  1.  Successful PCI of proximal ramus with drug-eluting stent x1 (2.0 x 6 mm).  IVUS guidance was utilized for  this PCI.  Post PCI IVUS demonstrated well-opposed and expanded stent.  MADINA 3 flow pre and post PCI     2.  Successful PCI of circumflex with drug-eluting stent x1 (2.25 x 22 mm).  MADINA 3 flow pre and post PCI     [facility] 11/18/19  Patent RCA mid stents with 50% lesion after stents  Prox Cx to Dist Cx lesion , 95% stenosed.  Ost 1st Mrg to 1st Mrg lesion , 90% stenosed.  LVEDP (Pre): 16       C: 3-19  Three vessel coronary artery disease.  Successful PCI for acute myocardial infarction of culprit RCA.  Prox RCA lesion , 99% stenosed reduced to 0%..  A STENT RESOLUTE CRISSY [id number] stent was successfully placed at 14 ROGER  Mid RCA lesion , 95% stenosed reduced to 0%..  A STENT RESOLUTE CRISSY [id number] stent was successfully placed at 12 ROGER  Prox Cx to Dist Cx lesion , 95% stenosed.  Ost 1st Mrg to 1st Mrg lesion , 90% stenosed.  Diffusely diseased LAD which is small vessel as well     Echo 6/20/22  The left ventricle is normal in size with mild concentric hypertrophy and hyperdynamic systolic function.  The estimated ejection fraction is 75%.  Normal right ventricular size with normal right ventricular systolic function.  There is mild aortic valve stenosis.  Aortic valve area is 1.59 cm2; peak velocity is 2.71 m/s; mean gradient is 19 mmHg.  There is moderate mitral stenosis.  The mean diastolic gradient across the mitral valve is 8 mmHg at a heart rate of 90 bpm.  The estimated PA systolic pressure is 58 mmHg.  There is pulmonary hypertension.     NST: 4-19  Abnormal myocardial perfusion scan  There were no arrhythmias during stress.  There was no ST segment deviation noted during stress.  The patient reported dizziness and nausea during the stress test.  There is a small amount of mild, infarct defect(s) in the inferior wall(s),In the typical distribution of the RCA territory.  LVEF post-stress is 68%  The EKG portion of this study is negative for myocardial ischemia.     Holter 7/17/19  Sinus rhythm with heart rates  varying between 82 and 136 bpm with an average of 96 bpm.  There were very rare PVCs totalling 21 and averaging 0.44 per hour.  There were very rare PACs totalling 30 and averaging 0.63 per hour.  The diary was returned blank.     Carotid US 5/6/29  There is 20-39% right Internal Carotid Stenosis.  There is 0-19% left Internal Carotid Stenosis.      LE arterial doppler 5/6/19  Hemodynamically significant greater than 70% lesion in the L SFA proximally  Moderate greater than 50% plaque noted in the right SFA  Noncompressible CLEMENT bilaterally     7/3/19 HR has been running in th 100-130 ranges even at rest at rehab  Has on atenolol previously which was switched to metoprolol after MI  Denies significant CP or SOB   sinus tachycardia - old IMI  Increase toprol XL 50 qd  Holter  OV 1 week     9/4/19 Says metoprolol gives her diarrhea - wants to go back to atenolol  Denies CP or SOB  Mild stable claudication     Admitted 11/17/19  Lorena NEIL Contreras 69 y.o. female with CAD, HTN, HLD, DM2 presents to the hospital with a chief complaint of chest pain. She reports today at 11am she developed sternal chest pain without radiation that was constant until relieved with nitro in the ED. She states it was not as severe as previous chest pain when she had her heart attack. She is pain free now. She does not experience exertional chest pain and finds she is able to mow her yard without pain. She does not need her nitro PRN at home. She denies fever, SOB, N/V, abdominal pain, dysuria, dizziness, syncope, hemoptysis.      Lorena T Diane 69 y.o. female placed in observation for chest pain. In the ED, EKG without acute ischemia, troponin negative, chest x-ray without acute process. Cardiology consulted. On 11/18/19,no chest pain overnight. LHC via Right fem art  showed :EDP 16, LAD mid 50%, Cx long mid 80-90% - diffusely diseased - similar to LHC 3/29/19, OM1 90% mid, RCA stents patent 50% beyond stents. Post procedure,  no complications and HDS. Added Imdur and continue to Brilinta/statin. Allergy to ASA. Consider add ACEI/ARBS if blood pressure tolerates. Follow up with Dr. Rico 19 Stopped imdur - worrying about reaction with hives several days after LHC  Still with angina during emotional or physical stress   Doubt she is allergic to imdur - would restart  Increase atenolol 50 bid  Will refer to Dr Monreal to review [facility] for possible PCI  OV with me 3 months     21  recently  - liver failure. Reports stable exertional CP - rarely needs NTG  EKG NSR NSSTT changes  OV 3 months with echo and lexiscan myoview for CP, CAD  Continue Rx for HTN, CAD, HLD      Went to the ER 22  Lorena Contreras is a 71 y.o. female, with a PMHx of CAD, HTN, HLD, DM, Anemia, who presents to the ED with central chest pain onset yesterday. Pt reports chest pain has been constant and is exacerbated by anxiety. Associated symptoms of nausea, diarrhea, LE edema, numbness in all fingers (onset this evening) and numbness in feet (chronic). Pt currently does not feel chest pain. No other exacerbating or alleviating factors. Patient denies cough, fever, chills, SOB, visual disturbance, paresthesia, dysuria, or other associated symptoms. This is the extent of the patient's complaints today in the Emergency Department.      22 Reports left sided CP where her port-a-cath was - feels like a pulling sensation - different from previous angina  BP poorly controlled - admits to eating too much salt    Review of Systems   Constitutional: Negative for decreased appetite.   HENT:  Negative for ear discharge.    Eyes:  Negative for blurred vision.   Respiratory:  Negative for hemoptysis.    Endocrine: Negative for polyphagia.   Hematologic/Lymphatic: Negative for adenopathy.   Skin:  Negative for color change.   Musculoskeletal:  Negative for joint swelling.   Genitourinary:  Negative for bladder incontinence.   Neurological:   Negative for brief paralysis.   Psychiatric/Behavioral:  Negative for hallucinations.    Allergic/Immunologic: Negative for hives.      Objective:    Physical Exam  Constitutional:       Appearance: She is well-developed.   HENT:      Head: Normocephalic and atraumatic.   Eyes:      Conjunctiva/sclera: Conjunctivae normal.      Pupils: Pupils are equal, round, and reactive to light.   Neck:      Thyroid: No thyromegaly.   Cardiovascular:      Rate and Rhythm: Normal rate and regular rhythm.      Heart sounds: No murmur heard.  Pulmonary:      Effort: Pulmonary effort is normal. No respiratory distress.      Breath sounds: Normal breath sounds.   Abdominal:      General: Bowel sounds are normal.      Palpations: Abdomen is soft.   Musculoskeletal:      Cervical back: Normal range of motion and neck supple.   Skin:     General: Skin is warm and dry.   Neurological:      Mental Status: She is alert and oriented to person, place, and time.   Psychiatric:         Behavior: Behavior normal.         Assessment:       1. Hypertension associated with diabetes    2. Hyperlipidemia, unspecified hyperlipidemia type    3. Cardiomegaly    4. Coronary artery disease of native artery of native heart with stable angina pectoris    5. Aortic atherosclerosis    6. Diastolic dysfunction with chronic heart failure    7. Uncontrolled type 2 diabetes mellitus with hyperosmolar nonketotic hyperglycemia    8. Peripheral vascular disease in diabetes mellitus -- CLEMENT 05/2019         Plan:       Add diovan 160 qd for HTN  Lexiscan myoview for CP  Echo with EF 75% - mild AS/MS  BMP, BNP  Continue Rx for HTN, CAD, HLD

## 2022-11-23 ENCOUNTER — HOSPITAL ENCOUNTER (OUTPATIENT)
Dept: RADIOLOGY | Facility: HOSPITAL | Age: 72
Discharge: HOME | End: 2022-11-23
Attending: INTERNAL MEDICINE
Payer: MEDICARE

## 2022-11-23 DIAGNOSIS — E78.5 HYPERLIPIDEMIA, UNSPECIFIED HYPERLIPIDEMIA TYPE: ICD-10-CM

## 2022-11-23 DIAGNOSIS — E11.00 UNCONTROLLED TYPE 2 DIABETES MELLITUS WITH HYPEROSMOLAR NONKETOTIC HYPERGLYCEMIA: ICD-10-CM

## 2022-11-23 DIAGNOSIS — I50.32 DIASTOLIC DYSFUNCTION WITH CHRONIC HEART FAILURE: ICD-10-CM

## 2022-11-23 DIAGNOSIS — I25.118 CORONARY ARTERY DISEASE OF NATIVE ARTERY OF NATIVE HEART WITH STABLE ANGINA PECTORIS: ICD-10-CM

## 2022-11-23 DIAGNOSIS — E11.51 PERIPHERAL VASCULAR DISEASE IN DIABETES MELLITUS: ICD-10-CM

## 2022-11-23 DIAGNOSIS — I15.2 HYPERTENSION ASSOCIATED WITH DIABETES: Chronic | ICD-10-CM

## 2022-11-23 DIAGNOSIS — E11.59 HYPERTENSION ASSOCIATED WITH DIABETES: Chronic | ICD-10-CM

## 2022-11-23 DIAGNOSIS — I70.0 AORTIC ATHEROSCLEROSIS: ICD-10-CM

## 2022-11-23 DIAGNOSIS — I51.7 CARDIOMEGALY: ICD-10-CM

## 2022-12-01 ENCOUNTER — HOSPITAL ENCOUNTER (OUTPATIENT)
Dept: CARDIOLOGY | Facility: HOSPITAL | Age: 72
Discharge: HOME | End: 2022-12-01
Attending: INTERNAL MEDICINE
Payer: MEDICARE

## 2022-12-01 ENCOUNTER — HOSPITAL ENCOUNTER (OUTPATIENT)
Dept: RADIOLOGY | Facility: HOSPITAL | Age: 72
Discharge: HOME | End: 2022-12-01
Attending: INTERNAL MEDICINE
Payer: MEDICARE

## 2022-12-01 LAB
CV STRESS BASE HR: 94 BPM
DIASTOLIC BLOOD PRESSURE: 80 MMHG
NUC STRESS EJECTION FRACTION: 71 %
OHS CV CPX 85 PERCENT MAX PREDICTED HEART RATE MALE: 121
OHS CV CPX MAX PREDICTED HEART RATE: 143
OHS CV CPX PATIENT IS FEMALE: 1
OHS CV CPX PATIENT IS MALE: 0
OHS CV CPX PEAK DIASTOLIC BLOOD PRESSURE: 72 MMHG
OHS CV CPX PEAK HEAR RATE: 109 BPM
OHS CV CPX PEAK RATE PRESSURE PRODUCT: NORMAL
OHS CV CPX PEAK SYSTOLIC BLOOD PRESSURE: 168 MMHG
OHS CV CPX PERCENT MAX PREDICTED HEART RATE ACHIEVED: 76
OHS CV CPX RATE PRESSURE PRODUCT PRESENTING: NORMAL
SYSTOLIC BLOOD PRESSURE: 172 MMHG

## 2022-12-01 PROCEDURE — 93016 CV STRESS TEST SUPVJ ONLY: CPT | Mod: ,,, | Performed by: INTERNAL MEDICINE

## 2022-12-01 PROCEDURE — 93018 NUCLEAR STRESS - CARDIOLOGY INTERPRETED (CUPID ONLY): ICD-10-PCS | Mod: ,,, | Performed by: INTERNAL MEDICINE

## 2022-12-01 PROCEDURE — 63600175 PHARM REV CODE 636 W HCPCS: Performed by: INTERNAL MEDICINE

## 2022-12-01 PROCEDURE — A9502 TC99M TETROFOSMIN: HCPCS

## 2022-12-01 PROCEDURE — 93017 CV STRESS TEST TRACING ONLY: CPT

## 2022-12-01 PROCEDURE — 78452 HT MUSCLE IMAGE SPECT MULT: CPT | Mod: 26,,, | Performed by: INTERNAL MEDICINE

## 2022-12-01 PROCEDURE — 93018 CV STRESS TEST I&R ONLY: CPT | Mod: ,,, | Performed by: INTERNAL MEDICINE

## 2022-12-01 PROCEDURE — 93016 NUCLEAR STRESS - CARDIOLOGY INTERPRETED (CUPID ONLY): ICD-10-PCS | Mod: ,,, | Performed by: INTERNAL MEDICINE

## 2022-12-01 PROCEDURE — 78452 NUCLEAR STRESS - CARDIOLOGY INTERPRETED (CUPID ONLY): ICD-10-PCS | Mod: 26,,, | Performed by: INTERNAL MEDICINE

## 2022-12-01 RX ORDER — REGADENOSON 0.08 MG/ML
0.4 INJECTION, SOLUTION INTRAVENOUS ONCE
Status: COMPLETED | OUTPATIENT
Start: 2022-12-01 | End: 2022-12-01

## 2022-12-01 RX ADMIN — REGADENOSON 0.4 MG: 0.08 INJECTION, SOLUTION INTRAVENOUS at 08:12

## 2022-12-05 ENCOUNTER — OFFICE VISIT (OUTPATIENT)
Dept: CARDIOLOGY | Facility: CLINIC | Age: 72
End: 2022-12-05
Payer: MEDICARE

## 2022-12-05 VITALS
HEART RATE: 99 BPM | BODY MASS INDEX: 22.94 KG/M2 | SYSTOLIC BLOOD PRESSURE: 134 MMHG | OXYGEN SATURATION: 98 % | DIASTOLIC BLOOD PRESSURE: 76 MMHG | HEIGHT: 69 IN | RESPIRATION RATE: 15 BRPM | WEIGHT: 154.88 LBS

## 2022-12-05 DIAGNOSIS — I15.2 HYPERTENSION ASSOCIATED WITH DIABETES: Chronic | ICD-10-CM

## 2022-12-05 DIAGNOSIS — I70.0 AORTIC ATHEROSCLEROSIS: ICD-10-CM

## 2022-12-05 DIAGNOSIS — I25.118 CORONARY ARTERY DISEASE OF NATIVE ARTERY OF NATIVE HEART WITH STABLE ANGINA PECTORIS: ICD-10-CM

## 2022-12-05 DIAGNOSIS — I27.20 PULMONARY HYPERTENSION: Primary | ICD-10-CM

## 2022-12-05 DIAGNOSIS — E11.59 HYPERTENSION ASSOCIATED WITH DIABETES: Chronic | ICD-10-CM

## 2022-12-05 DIAGNOSIS — I50.32 DIASTOLIC DYSFUNCTION WITH CHRONIC HEART FAILURE: ICD-10-CM

## 2022-12-05 DIAGNOSIS — E78.5 HYPERLIPIDEMIA, UNSPECIFIED HYPERLIPIDEMIA TYPE: ICD-10-CM

## 2022-12-05 PROCEDURE — 3062F PR POS MACROALBUMINURIA RESULT DOCUMENTED/REVIEW: ICD-10-PCS | Mod: CPTII,S$GLB,, | Performed by: INTERNAL MEDICINE

## 2022-12-05 PROCEDURE — 3078F DIAST BP <80 MM HG: CPT | Mod: CPTII,S$GLB,, | Performed by: INTERNAL MEDICINE

## 2022-12-05 PROCEDURE — 3046F HEMOGLOBIN A1C LEVEL >9.0%: CPT | Mod: CPTII,S$GLB,, | Performed by: INTERNAL MEDICINE

## 2022-12-05 PROCEDURE — 3288F FALL RISK ASSESSMENT DOCD: CPT | Mod: CPTII,S$GLB,, | Performed by: INTERNAL MEDICINE

## 2022-12-05 PROCEDURE — 1126F AMNT PAIN NOTED NONE PRSNT: CPT | Mod: CPTII,S$GLB,, | Performed by: INTERNAL MEDICINE

## 2022-12-05 PROCEDURE — 3078F PR MOST RECENT DIASTOLIC BLOOD PRESSURE < 80 MM HG: ICD-10-PCS | Mod: CPTII,S$GLB,, | Performed by: INTERNAL MEDICINE

## 2022-12-05 PROCEDURE — 3288F PR FALLS RISK ASSESSMENT DOCUMENTED: ICD-10-PCS | Mod: CPTII,S$GLB,, | Performed by: INTERNAL MEDICINE

## 2022-12-05 PROCEDURE — 1159F MED LIST DOCD IN RCRD: CPT | Mod: CPTII,S$GLB,, | Performed by: INTERNAL MEDICINE

## 2022-12-05 PROCEDURE — 4010F PR ACE/ARB THEARPY RXD/TAKEN: ICD-10-PCS | Mod: CPTII,S$GLB,, | Performed by: INTERNAL MEDICINE

## 2022-12-05 PROCEDURE — 99499 UNLISTED E&M SERVICE: CPT | Mod: HCNC,S$GLB,, | Performed by: INTERNAL MEDICINE

## 2022-12-05 PROCEDURE — 3008F BODY MASS INDEX DOCD: CPT | Mod: CPTII,S$GLB,, | Performed by: INTERNAL MEDICINE

## 2022-12-05 PROCEDURE — 3046F PR MOST RECENT HEMOGLOBIN A1C LEVEL > 9.0%: ICD-10-PCS | Mod: CPTII,S$GLB,, | Performed by: INTERNAL MEDICINE

## 2022-12-05 PROCEDURE — 3075F SYST BP GE 130 - 139MM HG: CPT | Mod: CPTII,S$GLB,, | Performed by: INTERNAL MEDICINE

## 2022-12-05 PROCEDURE — 99499 RISK ADDL DX/OHS AUDIT: ICD-10-PCS | Mod: HCNC,S$GLB,, | Performed by: INTERNAL MEDICINE

## 2022-12-05 PROCEDURE — 1126F PR PAIN SEVERITY QUANTIFIED, NO PAIN PRESENT: ICD-10-PCS | Mod: CPTII,S$GLB,, | Performed by: INTERNAL MEDICINE

## 2022-12-05 PROCEDURE — 1101F PT FALLS ASSESS-DOCD LE1/YR: CPT | Mod: CPTII,S$GLB,, | Performed by: INTERNAL MEDICINE

## 2022-12-05 PROCEDURE — 4010F ACE/ARB THERAPY RXD/TAKEN: CPT | Mod: CPTII,S$GLB,, | Performed by: INTERNAL MEDICINE

## 2022-12-05 PROCEDURE — 3008F PR BODY MASS INDEX (BMI) DOCUMENTED: ICD-10-PCS | Mod: CPTII,S$GLB,, | Performed by: INTERNAL MEDICINE

## 2022-12-05 PROCEDURE — 1159F PR MEDICATION LIST DOCUMENTED IN MEDICAL RECORD: ICD-10-PCS | Mod: CPTII,S$GLB,, | Performed by: INTERNAL MEDICINE

## 2022-12-05 PROCEDURE — 99214 PR OFFICE/OUTPT VISIT, EST, LEVL IV, 30-39 MIN: ICD-10-PCS | Mod: S$GLB,,, | Performed by: INTERNAL MEDICINE

## 2022-12-05 PROCEDURE — 99214 OFFICE O/P EST MOD 30 MIN: CPT | Mod: S$GLB,,, | Performed by: INTERNAL MEDICINE

## 2022-12-05 PROCEDURE — 99999 PR PBB SHADOW E&M-EST. PATIENT-LVL V: CPT | Mod: PBBFAC,,, | Performed by: INTERNAL MEDICINE

## 2022-12-05 PROCEDURE — 3066F PR DOCUMENTATION OF TREATMENT FOR NEPHROPATHY: ICD-10-PCS | Mod: CPTII,S$GLB,, | Performed by: INTERNAL MEDICINE

## 2022-12-05 PROCEDURE — 1101F PR PT FALLS ASSESS DOC 0-1 FALLS W/OUT INJ PAST YR: ICD-10-PCS | Mod: CPTII,S$GLB,, | Performed by: INTERNAL MEDICINE

## 2022-12-05 PROCEDURE — 99999 PR PBB SHADOW E&M-EST. PATIENT-LVL V: ICD-10-PCS | Mod: PBBFAC,,, | Performed by: INTERNAL MEDICINE

## 2022-12-05 PROCEDURE — 3066F NEPHROPATHY DOC TX: CPT | Mod: CPTII,S$GLB,, | Performed by: INTERNAL MEDICINE

## 2022-12-05 PROCEDURE — 3062F POS MACROALBUMINURIA REV: CPT | Mod: CPTII,S$GLB,, | Performed by: INTERNAL MEDICINE

## 2022-12-05 PROCEDURE — 3075F PR MOST RECENT SYSTOLIC BLOOD PRESS GE 130-139MM HG: ICD-10-PCS | Mod: CPTII,S$GLB,, | Performed by: INTERNAL MEDICINE

## 2022-12-05 NOTE — PROGRESS NOTES
Subjective:    Patient ID:  Lorena Contreras is a 72 y.o. female who presents for follow-up of No chief complaint on file.      HPI    CAD - JESIKA x 2 to RCA 3/29/19 - JESIKA to RI and Cx 1/8/20, HTN, HLD, DM     11/18/19 [facility] - EDP 16, LAD mid 50%, Cx long mid 80-90% - diffusely diseased - similar to [facility] 3/29/19, OM1 90% mid, RCA stents patent 50% beyond stents     Will review with interventional staff for possible out patient PCI - continue with medical Rx for now     Previously followed by Dr Barbosa - last seen 5/16/19  Patient is here for follow-up of coronary artery disease and ST-elevation MI.  She underwent PCI to the RCA.  She is known to have other blockages well for which she re-presented to the emergency department was admitted for observation for atypical chest pain.  She felt like it was anxiety related and she no longer has had any since discharge.  She underwent nuclear stress test which showed no significant ischemia.  She denies any other associated symptoms currently.  She has experienced no PND, orthopnea or lower extremity edema.  She denies any dizziness, presyncope or syncope.  She says she was given Atarax on discharge which has helped with her anxiety.     Her follow-up coronary artery disease and previous ST-elevation MI.  She is feeling anxious and feels like she has a panic attack today.  She again is somewhat tearful and says that her  is been feeling ill as well.  She says she was started on Prozac by her primary care physician but does not feel like this is doing much in terms of calming her down.  She still has some mild residual chest pain when she mostly feels anxious.  This is dissimilar to her previous angina symptoms.  She denies any other associated symptoms.  She denies any PND, orthopnea or lower extremity edema.  She denies any dizziness, presyncope or syncope.      [facility] 1/8/20  1.  Successful PCI of proximal ramus with drug-eluting stent x1 (2.0 x 6 mm).  IVUS guidance  was utilized for this PCI.  Post PCI IVUS demonstrated well-opposed and expanded stent.  MADINA 3 flow pre and post PCI     2.  Successful PCI of circumflex with drug-eluting stent x1 (2.25 x 22 mm).  MADINA 3 flow pre and post PCI     [facility] 11/18/19  Patent RCA mid stents with 50% lesion after stents  Prox Cx to Dist Cx lesion , 95% stenosed.  Ost 1st Mrg to 1st Mrg lesion , 90% stenosed.  LVEDP (Pre): 16       C: 3-19  Three vessel coronary artery disease.  Successful PCI for acute myocardial infarction of culprit RCA.  Prox RCA lesion , 99% stenosed reduced to 0%..  A STENT RESOLUTE CRISSY [id number] stent was successfully placed at 14 ROGER  Mid RCA lesion , 95% stenosed reduced to 0%..  A STENT RESOLUTE CRISSY 3.0X12MM stent was successfully placed at 12 ROEGR  Prox Cx to Dist Cx lesion , 95% stenosed.  Ost 1st Mrg to 1st Mrg lesion , 90% stenosed.  Diffusely diseased LAD which is small vessel as well     Echo 6/20/22  The left ventricle is normal in size with mild concentric hypertrophy and hyperdynamic systolic function.  The estimated ejection fraction is 75%.  Normal right ventricular size with normal right ventricular systolic function.  There is mild aortic valve stenosis.  Aortic valve area is 1.59 cm2; peak velocity is 2.71 m/s; mean gradient is 19 mmHg.  There is moderate mitral stenosis.  The mean diastolic gradient across the mitral valve is 8 mmHg at a heart rate of 90 bpm.  The estimated PA systolic pressure is 58 mmHg.  There is pulmonary hypertension.     Stress test 12/1/22    Normal myocardial perfusion scan. There is no evidence of myocardial ischemia or infarction.    The gated perfusion images showed an ejection fraction of 71% post stress.    The EKG portion of this study is negative for ischemia.    The patient reported no chest pain during the stress test.    There were no arrhythmias during stress.     Holter 7/17/19  Sinus rhythm with heart rates varying between 82 and 136 bpm with an average of 96  bpm.  There were very rare PVCs totalling 21 and averaging 0.44 per hour.  There were very rare PACs totalling 30 and averaging 0.63 per hour.  The diary was returned blank.     Carotid US 5/6/29  There is 20-39% right Internal Carotid Stenosis.  There is 0-19% left Internal Carotid Stenosis.      LE arterial doppler 5/6/19  Hemodynamically significant greater than 70% lesion in the L SFA proximally  Moderate greater than 50% plaque noted in the right SFA  Noncompressible CLEMENT bilaterally     7/3/19 HR has been running in th 100-130 ranges even at rest at rehab  Has on atenolol previously which was switched to metoprolol after MI  Denies significant CP or SOB   sinus tachycardia - old IMI  Increase toprol XL 50 qd  Holter  OV 1 week     9/4/19 Says metoprolol gives her diarrhea - wants to go back to atenolol  Denies CP or SOB  Mild stable claudication     Admitted 11/17/19  Lorena Blackmonhannah 69 y.o. female with CAD, HTN, HLD, DM2 presents to the hospital with a chief complaint of chest pain. She reports today at 11am she developed sternal chest pain without radiation that was constant until relieved with nitro in the ED. She states it was not as severe as previous chest pain when she had her heart attack. She is pain free now. She does not experience exertional chest pain and finds she is able to mow her yard without pain. She does not need her nitro PRN at home. She denies fever, SOB, N/V, abdominal pain, dysuria, dizziness, syncope, hemoptysis.      Lorena Brownearnest 69 y.o. female placed in observation for chest pain. In the ED, EKG without acute ischemia, troponin negative, chest x-ray without acute process. Cardiology consulted. On 11/18/19,no chest pain overnight. LHC via Right fem art  showed :EDP 16, LAD mid 50%, Cx long mid 80-90% - diffusely diseased - similar to LHC 3/29/19, OM1 90% mid, RCA stents patent 50% beyond stents. Post procedure, no complications and HDS. Added Imdur and continue to  Brilinta/statin. Allergy to ASA. Consider add ACEI/ARBS if blood pressure tolerates. Follow up with Dr. Rico 19 Stopped imdur - worrying about reaction with hives several days after LHC  Still with angina during emotional or physical stress   Doubt she is allergic to imdur - would restart  Increase atenolol 50 bid  Will refer to Dr Monreal to review [facility] for possible PCI  OV with me 3 months     21  recently  - liver failure. Reports stable exertional CP - rarely needs NTG  EKG NSR NSSTT changes  OV 3 months with echo and lexiscan myoview for CP, CAD  Continue Rx for HTN, CAD, HLD      Went to the ER 22  Lorena Contreras is a 71 y.o. female, with a PMHx of CAD, HTN, HLD, DM, Anemia, who presents to the ED with central chest pain onset yesterday. Pt reports chest pain has been constant and is exacerbated by anxiety. Associated symptoms of nausea, diarrhea, LE edema, numbness in all fingers (onset this evening) and numbness in feet (chronic). Pt currently does not feel chest pain. No other exacerbating or alleviating factors. Patient denies cough, fever, chills, SOB, visual disturbance, paresthesia, dysuria, or other associated symptoms. This is the extent of the patient's complaints today in the Emergency Department.       22 Reports left sided CP where her port-a-cath was - feels like a pulling sensation - different from previous angina  BP poorly controlled - admits to eating too much salt  Add diovan 160 qd for HTN  Lexiscan myoview for CP  Echo with EF 75% - mild AS/MS  BMP, BNP  Continue Rx for HTN, CAD, HLD    22 CP has resolved. Denies SOb  Labs not done  BP controlled    Review of Systems   Constitutional: Negative for decreased appetite.   HENT:  Negative for ear discharge.    Eyes:  Negative for blurred vision.   Respiratory:  Negative for hemoptysis.    Endocrine: Negative for polyphagia.   Hematologic/Lymphatic: Negative for adenopathy.   Skin:  Negative  for color change.   Musculoskeletal:  Negative for joint swelling.   Genitourinary:  Negative for bladder incontinence.   Neurological:  Negative for brief paralysis.   Psychiatric/Behavioral:  Negative for hallucinations.    Allergic/Immunologic: Negative for hives.      Objective:    Physical Exam  Constitutional:       Appearance: She is well-developed.   HENT:      Head: Normocephalic and atraumatic.   Eyes:      Conjunctiva/sclera: Conjunctivae normal.      Pupils: Pupils are equal, round, and reactive to light.   Neck:      Thyroid: No thyromegaly.   Cardiovascular:      Rate and Rhythm: Normal rate and regular rhythm.      Heart sounds: No murmur heard.  Pulmonary:      Effort: Pulmonary effort is normal. No respiratory distress.      Breath sounds: Normal breath sounds.   Abdominal:      General: Bowel sounds are normal.      Palpations: Abdomen is soft.   Musculoskeletal:      Cervical back: Normal range of motion and neck supple.   Skin:     General: Skin is warm and dry.   Neurological:      Mental Status: She is alert and oriented to person, place, and time.   Psychiatric:         Behavior: Behavior normal.         Assessment:       1. Pulmonary hypertension -- echo 11/2019    2. Aortic atherosclerosis    3. Coronary artery disease of native artery of native heart with stable angina pectoris    4. Diastolic dysfunction with chronic heart failure    5. Hypertension associated with diabetes    6. Hyperlipidemia, unspecified hyperlipidemia type         Plan:       Stress test negative for ischemia. Needs labs done    Continue Rx for HTN, CAD, HLD, DM  OV 3 months

## 2022-12-06 ENCOUNTER — PATIENT OUTREACH (OUTPATIENT)
Dept: ADMINISTRATIVE | Facility: HOSPITAL | Age: 72
End: 2022-12-06
Payer: MEDICARE

## 2022-12-06 NOTE — PROGRESS NOTES
Health Maintenance Due   Topic Date Due    COVID-19 Vaccine (1) Never done    Shingles Vaccine (1 of 2) Never done    Colorectal Cancer Screening  Never done    DEXA Scan  12/08/2018    Mammogram  03/26/2019    Eye Exam  03/12/2022    Hemoglobin A1c  08/26/2022    Lipid Panel  10/22/2022

## 2022-12-09 ENCOUNTER — PATIENT MESSAGE (OUTPATIENT)
Dept: ADMINISTRATIVE | Facility: HOSPITAL | Age: 72
End: 2022-12-09
Payer: MEDICARE

## 2022-12-31 DIAGNOSIS — E11.319 DIABETIC RETINOPATHY OF BOTH EYES ASSOCIATED WITH TYPE 2 DIABETES MELLITUS, MACULAR EDEMA PRESENCE UNSPECIFIED, UNSPECIFIED RETINOPATHY SEVERITY: Primary | ICD-10-CM

## 2023-01-03 ENCOUNTER — TELEPHONE (OUTPATIENT)
Dept: FAMILY MEDICINE | Facility: CLINIC | Age: 73
End: 2023-01-03
Payer: MEDICARE

## 2023-01-03 NOTE — TELEPHONE ENCOUNTER
----- Message from Jorge Paris MD sent at 12/31/2022 10:24 AM CST -----  Please call the patient regarding the following results:    Patient has diabetic retinopathy which needs treatment/follow-up with Ophthalmology.     Please contact our Referrals Coordinator at [phone] if you have not received a call within 2 business days to check on the status     Also patient is overdue for all her labs ordered 3 months ago -- please schedule patient for A1c/BMP/complete blood count/iron studies/lipid panel ASAP     Please schedule patient for clinic visit in 3-4 months

## 2023-01-09 ENCOUNTER — PATIENT MESSAGE (OUTPATIENT)
Dept: ADMINISTRATIVE | Facility: HOSPITAL | Age: 73
End: 2023-01-09
Payer: MEDICARE

## 2023-01-14 ENCOUNTER — HOSPITAL ENCOUNTER (EMERGENCY)
Facility: HOSPITAL | Age: 73
Discharge: LEFT BEFORE DISCHARGE | End: 2023-01-14
Attending: EMERGENCY MEDICINE
Payer: MEDICARE

## 2023-01-14 VITALS
SYSTOLIC BLOOD PRESSURE: 183 MMHG | BODY MASS INDEX: 22.81 KG/M2 | HEART RATE: 75 BPM | RESPIRATION RATE: 18 BRPM | WEIGHT: 154 LBS | OXYGEN SATURATION: 100 % | TEMPERATURE: 98 F | HEIGHT: 69 IN | DIASTOLIC BLOOD PRESSURE: 78 MMHG

## 2023-01-14 DIAGNOSIS — I10 HYPERTENSION, UNSPECIFIED TYPE: ICD-10-CM

## 2023-01-14 DIAGNOSIS — R73.9 HYPERGLYCEMIA: ICD-10-CM

## 2023-01-14 DIAGNOSIS — J42 CHRONIC BRONCHITIS, UNSPECIFIED CHRONIC BRONCHITIS TYPE: ICD-10-CM

## 2023-01-14 DIAGNOSIS — R07.89 ACUTE CHEST WALL PAIN: ICD-10-CM

## 2023-01-14 DIAGNOSIS — N17.9 AKI (ACUTE KIDNEY INJURY): Primary | ICD-10-CM

## 2023-01-14 DIAGNOSIS — R07.81 RIB PAIN: ICD-10-CM

## 2023-01-14 LAB
ALBUMIN SERPL-MCNC: 3.4 G/DL (ref 3.3–5.5)
ALP SERPL-CCNC: 128 U/L (ref 42–141)
BILIRUB SERPL-MCNC: 0.5 MG/DL (ref 0.2–1.6)
BUN SERPL-MCNC: 33 MG/DL (ref 7–22)
CALCIUM SERPL-MCNC: 10.1 MG/DL (ref 8–10.3)
CHLORIDE SERPL-SCNC: 94 MMOL/L (ref 98–108)
CREAT SERPL-MCNC: 1.6 MG/DL (ref 0.6–1.2)
CTP QC/QA: YES
GLUCOSE SERPL-MCNC: 505 MG/DL (ref 73–118)
INFLUENZA A ANTIGEN, POC: NEGATIVE
INFLUENZA B ANTIGEN, POC: NEGATIVE
POC ALT (SGPT): 23 U/L (ref 10–47)
POC AST (SGOT): 36 U/L (ref 11–38)
POC B-TYPE NATRIURETIC PEPTIDE: 178 PG/ML (ref 0–100)
POC CARDIAC TROPONIN I: 0 NG/ML (ref 0–0.08)
POC PTINR: 1.2 (ref 0.9–1.2)
POC PTWBT: 14 SEC (ref 9.7–14.3)
POC RAPID STREP A: NEGATIVE
POC TCO2: 26 MMOL/L (ref 18–33)
POCT GLUCOSE: 443 MG/DL (ref 70–110)
POCT GLUCOSE: >500 MG/DL (ref 70–110)
POTASSIUM BLD-SCNC: 4.2 MMOL/L (ref 3.6–5.1)
PROTEIN, POC: 8.3 G/DL (ref 6.4–8.1)
SAMPLE: NORMAL
SAMPLE: NORMAL
SARS-COV-2 RDRP RESP QL NAA+PROBE: NEGATIVE
SODIUM BLD-SCNC: 137 MMOL/L (ref 128–145)

## 2023-01-14 PROCEDURE — 84484 ASSAY OF TROPONIN QUANT: CPT | Mod: HCNC,ER

## 2023-01-14 PROCEDURE — 80053 COMPREHEN METABOLIC PANEL: CPT | Mod: HCNC,ER

## 2023-01-14 PROCEDURE — 87635 SARS-COV-2 COVID-19 AMP PRB: CPT | Mod: HCNC,ER | Performed by: EMERGENCY MEDICINE

## 2023-01-14 PROCEDURE — 83880 ASSAY OF NATRIURETIC PEPTIDE: CPT | Mod: HCNC,ER

## 2023-01-14 PROCEDURE — 93010 ELECTROCARDIOGRAM REPORT: CPT | Mod: HCNC,,, | Performed by: INTERNAL MEDICINE

## 2023-01-14 PROCEDURE — 99284 EMERGENCY DEPT VISIT MOD MDM: CPT | Mod: 25,HCNC,ER

## 2023-01-14 PROCEDURE — 25000003 PHARM REV CODE 250: Mod: HCNC,ER | Performed by: EMERGENCY MEDICINE

## 2023-01-14 PROCEDURE — 82962 GLUCOSE BLOOD TEST: CPT | Mod: HCNC,ER

## 2023-01-14 PROCEDURE — 96360 HYDRATION IV INFUSION INIT: CPT | Mod: HCNC,ER

## 2023-01-14 PROCEDURE — 81003 URINALYSIS AUTO W/O SCOPE: CPT | Mod: HCNC,ER

## 2023-01-14 PROCEDURE — 93010 EKG 12-LEAD: ICD-10-PCS | Mod: HCNC,,, | Performed by: INTERNAL MEDICINE

## 2023-01-14 PROCEDURE — 85610 PROTHROMBIN TIME: CPT | Mod: HCNC,ER

## 2023-01-14 PROCEDURE — 93005 ELECTROCARDIOGRAM TRACING: CPT | Mod: HCNC,ER

## 2023-01-14 PROCEDURE — 85025 COMPLETE CBC W/AUTO DIFF WBC: CPT | Mod: HCNC,ER

## 2023-01-14 RX ORDER — ALBUTEROL SULFATE 90 UG/1
2 AEROSOL, METERED RESPIRATORY (INHALATION) EVERY 6 HOURS PRN
Qty: 18 G | Refills: 0 | Status: SHIPPED | OUTPATIENT
Start: 2023-01-14 | End: 2023-06-22

## 2023-01-14 RX ORDER — FLUTICASONE PROPIONATE 50 MCG
2 SPRAY, SUSPENSION (ML) NASAL DAILY
Qty: 16 G | Refills: 0 | Status: SHIPPED | OUTPATIENT
Start: 2023-01-14 | End: 2023-06-22

## 2023-01-14 RX ORDER — MONTELUKAST SODIUM 10 MG/1
10 TABLET ORAL NIGHTLY
Qty: 30 TABLET | Refills: 0 | Status: SHIPPED | OUTPATIENT
Start: 2023-01-14 | End: 2023-02-13

## 2023-01-14 RX ORDER — AMLODIPINE BESYLATE 5 MG/1
10 TABLET ORAL
Status: DISCONTINUED | OUTPATIENT
Start: 2023-01-14 | End: 2023-01-14 | Stop reason: HOSPADM

## 2023-01-14 RX ORDER — BENZONATATE 100 MG/1
100 CAPSULE ORAL 3 TIMES DAILY PRN
Qty: 20 CAPSULE | Refills: 0 | Status: SHIPPED | OUTPATIENT
Start: 2023-01-14 | End: 2023-01-24

## 2023-01-14 RX ORDER — LORATADINE 10 MG/1
10 TABLET ORAL EVERY MORNING
Qty: 60 TABLET | Refills: 0 | Status: SHIPPED | OUTPATIENT
Start: 2023-01-14 | End: 2023-06-22

## 2023-01-14 RX ORDER — PROMETHAZINE HYDROCHLORIDE AND DEXTROMETHORPHAN HYDROBROMIDE 6.25; 15 MG/5ML; MG/5ML
5 SYRUP ORAL NIGHTLY PRN
Qty: 118 ML | Refills: 0 | Status: SHIPPED | OUTPATIENT
Start: 2023-01-14 | End: 2023-01-24

## 2023-01-14 RX ORDER — BENZONATATE 100 MG/1
200 CAPSULE ORAL
Status: COMPLETED | OUTPATIENT
Start: 2023-01-14 | End: 2023-01-14

## 2023-01-14 RX ADMIN — SODIUM CHLORIDE 1000 ML: 9 INJECTION, SOLUTION INTRAVENOUS at 07:01

## 2023-01-14 RX ADMIN — BENZONATATE 200 MG: 100 CAPSULE ORAL at 07:01

## 2023-01-14 NOTE — LETTER
Patient: Lorena Contreras  YOB: 1950  Date: 1/14/2023 Time: 8:33 PM  Location: [facility]    Leaving the [facility] Against Medical Advice    Chart #:[id number]    This will certify that I, the undersigned,    ______________________________________________________________________    A patient in the above named medical center, having requested discharge and removal from the medical center against the advice of my attending physician(s), hereby release [facility], its physicians, officers and employees, severally and individually, from any and all liability of any nature whatsoever for any injury or harm or complication of any kind that may result directly or indirectly, by reason of my terminating my stay as a patient at [facility] and my departure from [facility], and hereby waive any and all rights of action I may now have or later acquire as a result of my voluntary departure from [facility] and the termination of my stay as a patient therein.    This release is made with the full knowledge of the danger that may result from the action which I am taking.      Date:_______________________                         ___________________________                                                                                    Patient/Legal Representative    Witness:        ____________________________                          ___________________________  Nurse                                                                        Physician

## 2023-01-15 NOTE — ED PROVIDER NOTES
Care turned over from Dr Urban pending management of hyperglycemia and hypertension. Makayla James MD, Emergency Medicine Staff 7:00 PM 1/14/2023    Patient requesting immediate discharge despite incomplete ED management of BP and BGL and elects to sign out AMA Makayla James MD, Emergency Medicine Staff 8:45 PM 1/14/2023           Makayla James MD  01/15/23 0155

## 2023-01-15 NOTE — ED PROVIDER NOTES
Encounter Date: 1/14/2023    SCRIBE #1 NOTE: I, Erasmo Zee, am scribing for, and in the presence of,  Alma Delia Urban MD. I have scribed the following portions of the note - Other sections scribed: HPI, ROS, PE.     History     Chief Complaint   Patient presents with    Abdominal Pain    chest wall pain     Pt presents to ed with c/o mid sternum chest wall pain/burning that started after coughing 3 hrs pta. Pt states that she she has had cough x 3 weeks.  States pain is worse when she take deep breaths.      72 year-old female patient with a PMHx of DM II, HTN, colon polyps, fibromyalgia, GERD, and HLD presents to the ED with bilateral rib pain x 1 day. Pain is worsened with deep breaths and coughs. She has been coughing x 3 weeks. Cough is productive. She was treating cough with OTC meds but now can't find OTC meds. Patient denies associated SOB, fever, or other symptoms. Patient has no pertinent allergies. Patient has a SHx of a colonoscopy. Patient denies tobacco, EtOH, or drug use.    The history is provided by the patient. No  was used.   Review of patient's allergies indicates:   Allergen Reactions    Novolin 70/30 (semi-synthetic) Nausea And Vomiting     Severe vomiting on Relion 70/30    Shellfish containing products Swelling    Sulfa (sulfonamide antibiotics) Anaphylaxis    Talwin [pentazocine lactate] Anaphylaxis    Victoza [liraglutide] Nausea And Vomiting    Glipizide Nausea Only    Citric acid     Codeine     Influenza virus vaccines Hives    Iodine and iodide containing products Hives    Rituxan [rituximab] Hives    Zoloft [sertraline] Nausea And Vomiting     Past Medical History:   Diagnosis Date    Allergy     Altered mental status 06/19/2022    DYSARTHRIA, SPASTIC MOVEMENTS & DIFFICULTY SWALLOWING    Anemia     Anxiety     Arthritis     Cataract     both removed    Colon polyps     Coronary artery disease     Depression     Diabetes mellitus, type II     Disorder of kidney and  ureter     Fibromyalgia     Follicular lymphoma     GERD (gastroesophageal reflux disease)     HTN (hypertension)     Hyperlipidemia     MI (myocardial infarction) 03/2019    Personal history of colonic polyps     Restless leg syndrome     Stroke      Past Surgical History:   Procedure Laterality Date    COLONOSCOPY  11/07/2012    Colon polyp found; repeat in 5 years    ELBOW SURGERY Right 2015    dislocation repair     ESOPHAGOGASTRODUODENOSCOPY  11/07/2012    atrophic gastritis, H pylori testing negative    INCISION AND DRAINAGE FOOT Right 6/2/2021    Procedure: INCISION AND DRAINAGE, FOOT, bone biopsy;  Surgeon: Quiana Penn DPM;  Location: [facility] OR;  Service: Podiatry;  Laterality: Right;    KNEE SURGERY Bilateral 2015    scoped    LEFT HEART CATHETERIZATION Left 3/29/2019    Procedure: Left heart cath;  Surgeon: Bladimir Barbosa MD;  Location: [facility] CATH LAB;  Service: Cardiology;  Laterality: Left;    LEFT HEART CATHETERIZATION Left 11/18/2019    Procedure: Left heart cath;  Surgeon: Karl Rico MD;  Location: [facility] CATH LAB;  Service: Cardiology;  Laterality: Left;    LEFT HEART CATHETERIZATION Left 1/8/2020    Procedure: Left heart cath, right radial, noon start;  Surgeon: Christos Monreal MD;  Location: [facility] CATH LAB;  Service: Cardiology;  Laterality: Left;  RN Pre Op 1-6-20.  To be admitted 1-7-20 sor Aspirin Disensitation    TONSILLECTOMY  1955    ULTRASOUND GUIDANCE  1/8/2020    Procedure: Ultrasound Guidance;  Surgeon: Christos Monreal MD;  Location: [facility] CATH LAB;  Service: Cardiology;;     Family History   Problem Relation Age of Onset    Cancer Mother         colon    Heart disease Mother     Cancer Father         lung    Lung cancer Brother     Diabetes Sister     Hypertension Sister     Allergy (severe) Daughter     No Known Problems Daughter     Stroke Neg Hx     Hyperlipidemia Neg Hx      Social History     Tobacco Use    Smoking status: Never    Smokeless tobacco: Never   Substance Use  Topics    Alcohol use: Not Currently    Drug use: Never     Review of Systems   Constitutional:  Negative for activity change, appetite change, chills and fever.   HENT:  Negative for congestion, rhinorrhea, sneezing and sore throat.    Respiratory:  Positive for cough. Negative for choking, shortness of breath and wheezing.    Cardiovascular:  Positive for chest pain. Negative for palpitations.   Gastrointestinal:  Positive for abdominal pain. Negative for diarrhea, nausea and vomiting.   Neurological:  Negative for dizziness, syncope, light-headedness and headaches.   All other systems reviewed and are negative.    Physical Exam     Initial Vitals [01/14/23 1542]   BP Pulse Resp Temp SpO2   (!) 148/76 78 18 98.2 °F (36.8 °C) 99 %      MAP       --         Physical Exam    Nursing note and vitals reviewed.  Constitutional: She appears well-developed and well-nourished. No distress.   HENT:   Head: Normocephalic and atraumatic.   Eyes: Conjunctivae are normal.   Neck:   Normal range of motion.  Cardiovascular:  Normal rate, regular rhythm, normal heart sounds and normal pulses.           No murmur heard.  Pulmonary/Chest: Effort normal and breath sounds normal. No respiratory distress.   Abdominal: Abdomen is soft. Bowel sounds are normal. She exhibits no distension. There is no abdominal tenderness.   Musculoskeletal:         General: No tenderness or edema. Normal range of motion.      Cervical back: Normal range of motion.      Right lower leg: Normal. No edema.      Left lower leg: Normal. No edema.     Neurological: She is alert and oriented to person, place, and time. GCS score is 15. GCS eye subscore is 4. GCS verbal subscore is 5. GCS motor subscore is 6.   Skin: Skin is warm and dry.   Psychiatric: She has a normal mood and affect. Her behavior is normal.       ED Course   Procedures  Labs Reviewed   POCT GLUCOSE - Abnormal; Notable for the following components:       Result Value    POCT Glucose >500 (*)      All other components within normal limits   POCT CMP - Abnormal; Notable for the following components:    POC BUN 33 (*)     POC Chloride 94 (*)     POC Creatinine 1.6 (*)     POC Glucose 505 (*)     Protein, POC 8.3 (*)     All other components within normal limits   POCT B-TYPE NATRIURETIC PEPTIDE (BNP) - Abnormal; Notable for the following components:    POC B-Type Natriuretic Peptide 178 (*)     All other components within normal limits   POCT GLUCOSE - Abnormal; Notable for the following components:    POCT Glucose 443 (*)     All other components within normal limits   TROPONIN ISTAT   SARS-COV-2 RDRP GENE    Narrative:     This test utilizes isothermal nucleic acid amplification technology to detect the SARS-CoV-2 RdRp nucleic acid segment. The analytical sensitivity (limit of detection) is 500 copies/swab.     A POSITIVE result is indicative of the presence of SARS-CoV-2 RNA; clinical correlation with patient history and other diagnostic information is necessary to determine patient infection status.    A NEGATIVE result means that SARS-CoV-2 nucleic acids are not present above the limit of detection. A NEGATIVE result should be treated as presumptive. It does not rule out the possibility of COVID-19 and should not be the sole basis for treatment decisions. If COVID-19 is strongly suspected based on clinical and exposure history, re-testing using an alternate molecular assay should be considered.     This test is only for use under the Food and Drug Administration s Emergency Use Authorization (EUA).     Commercial kits are provided by [employer]. Performance characteristics of the EUA have been independently verified by [facility] Department of Pathology and Laboratory Medicine.   _________________________________________________________________   The authorized Fact Sheet for Healthcare Providers and the authorized Fact Sheet for Patients of the ID NOW COVID-19 are available on the  FDA website:    https://www.fda.gov/media/117871/download      https://www.fda.gov/media/784332/download      POCT CBC   POCT CMP   POCT PROTIME-INR   POCT TROPONIN   POCT B-TYPE NATRIURETIC PEPTIDE (BNP)   POCT STREP A, RAPID   POCT RAPID INFLUENZA A/B   ISTAT PROCEDURE          Imaging Results              X-Ray Ribs 3 Views Bilateral (Final result)  Result time 01/14/23 18:54:48      Final result by Leatha Palomo MD (01/14/23 18:54:48)                   Impression:      No acute cardiopulmonary process identified.    No acute displaced rib fracture seen.      Electronically signed by: Leatha Palomo MD  Date:    01/14/2023  Time:    18:54               Narrative:    EXAMINATION:  XR RIBS 3 VIEWS BILATERAL    CLINICAL HISTORY:  Pleurodynia    TECHNIQUE:  Chest and bilateral ribs 6 total views.    COMPARISON:  16:05.    FINDINGS:  Cardiac silhouette is normal in size.  Lungs are symmetrically expanded.  No evidence of focal consolidative process, pneumothorax, or significant pleural effusion.  No acute osseous abnormality identified.  No acute displaced rib fractures are identified.                                       X-Ray Chest PA And Lateral (Final result)  Result time 01/14/23 16:28:27      Final result by Kenney Stark MD (01/14/23 16:28:27)                   Impression:      No acute intrathoracic abnormality.      Electronically signed by: Kenney Stark MD  Date:    01/14/2023  Time:    16:28               Narrative:    EXAMINATION:  XR CHEST PA AND LATERAL    CLINICAL HISTORY:  Chest Pain;    TECHNIQUE:  PA and lateral views of the chest were performed.    COMPARISON:  09/24/2022    FINDINGS:  Lungs are symmetrically expanded.  No focal airspace consolidation, pleural effusion, pneumothorax.  Cardiac silhouette hilar contours are stable.  Aortic arch atherosclerosis.  Osseous degenerative change without acute abnormality.  No free under the diaphragm.                                        Medications   benzonatate capsule 200 mg (200 mg Oral Given 1/14/23 1924)   sodium chloride 0.9% bolus 1,000 mL 1,000 mL (0 mLs Intravenous Stopped 1/14/23 2032)     Medical Decision Making:   History:   Old Medical Records: I decided to obtain old medical records.  Clinical Tests:   Lab Tests: Reviewed and Ordered  Radiological Study: Reviewed and Ordered  Medical Tests: Reviewed and Ordered  ED Management:  Case was turned over to Dr. James at the end of my shift.        Scribe Attestation:   Scribe #1: I performed the above scribed service and the documentation accurately describes the services I performed. I attest to the accuracy of the note.                   Scribe attestation: I, Alma Delia Urban MD, personally performed the services described in this documentation.  All medical record entries made by the scribe were at my direction and in my presence.  I have reviewed the chart and agree that the record reflects my personal performance and is accurate and complete.      Clinical Impression:   Final diagnoses:  [R07.89] Acute chest wall pain  [R07.81] Rib pain  [J42] Chronic bronchitis, unspecified chronic bronchitis type  [R73.9] Hyperglycemia  [N17.9] KYA (acute kidney injury) (Primary)  [I10] Hypertension, unspecified type        ED Disposition Condition    AMA Stable                Alma Delia Urban MD  01/15/23 8087

## 2023-02-07 DIAGNOSIS — Z00.00 ENCOUNTER FOR MEDICARE ANNUAL WELLNESS EXAM: ICD-10-CM

## 2023-02-09 DIAGNOSIS — Z00.00 ENCOUNTER FOR MEDICARE ANNUAL WELLNESS EXAM: ICD-10-CM

## 2023-02-15 ENCOUNTER — OFFICE VISIT (OUTPATIENT)
Dept: FAMILY MEDICINE | Facility: CLINIC | Age: 73
End: 2023-02-15
Payer: MEDICARE

## 2023-02-15 VITALS
HEIGHT: 69 IN | DIASTOLIC BLOOD PRESSURE: 70 MMHG | WEIGHT: 161.63 LBS | BODY MASS INDEX: 23.94 KG/M2 | OXYGEN SATURATION: 99 % | HEART RATE: 75 BPM | SYSTOLIC BLOOD PRESSURE: 162 MMHG | TEMPERATURE: 98 F

## 2023-02-15 DIAGNOSIS — E11.59 HYPERTENSION ASSOCIATED WITH DIABETES: ICD-10-CM

## 2023-02-15 DIAGNOSIS — E11.00 UNCONTROLLED TYPE 2 DIABETES MELLITUS WITH HYPEROSMOLAR NONKETOTIC HYPERGLYCEMIA: ICD-10-CM

## 2023-02-15 DIAGNOSIS — I15.2 HYPERTENSION ASSOCIATED WITH DIABETES: ICD-10-CM

## 2023-02-15 DIAGNOSIS — I25.2 HISTORY OF MYOCARDIAL INFARCTION: ICD-10-CM

## 2023-02-15 DIAGNOSIS — Z09 HOSPITAL DISCHARGE FOLLOW-UP: Primary | ICD-10-CM

## 2023-02-15 DIAGNOSIS — I70.0 AORTIC ATHEROSCLEROSIS: ICD-10-CM

## 2023-02-15 DIAGNOSIS — N18.31 STAGE 3A CHRONIC KIDNEY DISEASE: ICD-10-CM

## 2023-02-15 DIAGNOSIS — J40 BRONCHITIS: ICD-10-CM

## 2023-02-15 DIAGNOSIS — I25.118 CORONARY ARTERY DISEASE OF NATIVE ARTERY OF NATIVE HEART WITH STABLE ANGINA PECTORIS: ICD-10-CM

## 2023-02-15 DIAGNOSIS — R07.89 CHEST WALL PAIN: ICD-10-CM

## 2023-02-15 PROCEDURE — 3078F PR MOST RECENT DIASTOLIC BLOOD PRESSURE < 80 MM HG: ICD-10-PCS | Mod: HCNC,CPTII,S$GLB, | Performed by: FAMILY MEDICINE

## 2023-02-15 PROCEDURE — 99999 PR PBB SHADOW E&M-EST. PATIENT-LVL III: CPT | Mod: PBBFAC,HCNC,, | Performed by: FAMILY MEDICINE

## 2023-02-15 PROCEDURE — 1101F PR PT FALLS ASSESS DOC 0-1 FALLS W/OUT INJ PAST YR: ICD-10-PCS | Mod: HCNC,CPTII,S$GLB, | Performed by: FAMILY MEDICINE

## 2023-02-15 PROCEDURE — 99999 PR PBB SHADOW E&M-EST. PATIENT-LVL III: ICD-10-PCS | Mod: PBBFAC,HCNC,, | Performed by: FAMILY MEDICINE

## 2023-02-15 PROCEDURE — 1125F AMNT PAIN NOTED PAIN PRSNT: CPT | Mod: HCNC,CPTII,S$GLB, | Performed by: FAMILY MEDICINE

## 2023-02-15 PROCEDURE — 3288F PR FALLS RISK ASSESSMENT DOCUMENTED: ICD-10-PCS | Mod: HCNC,CPTII,S$GLB, | Performed by: FAMILY MEDICINE

## 2023-02-15 PROCEDURE — 1125F PR PAIN SEVERITY QUANTIFIED, PAIN PRESENT: ICD-10-PCS | Mod: HCNC,CPTII,S$GLB, | Performed by: FAMILY MEDICINE

## 2023-02-15 PROCEDURE — 3288F FALL RISK ASSESSMENT DOCD: CPT | Mod: HCNC,CPTII,S$GLB, | Performed by: FAMILY MEDICINE

## 2023-02-15 PROCEDURE — 3008F PR BODY MASS INDEX (BMI) DOCUMENTED: ICD-10-PCS | Mod: HCNC,CPTII,S$GLB, | Performed by: FAMILY MEDICINE

## 2023-02-15 PROCEDURE — 1160F RVW MEDS BY RX/DR IN RCRD: CPT | Mod: HCNC,CPTII,S$GLB, | Performed by: FAMILY MEDICINE

## 2023-02-15 PROCEDURE — 99214 PR OFFICE/OUTPT VISIT, EST, LEVL IV, 30-39 MIN: ICD-10-PCS | Mod: HCNC,S$GLB,, | Performed by: FAMILY MEDICINE

## 2023-02-15 PROCEDURE — 1101F PT FALLS ASSESS-DOCD LE1/YR: CPT | Mod: HCNC,CPTII,S$GLB, | Performed by: FAMILY MEDICINE

## 2023-02-15 PROCEDURE — 3008F BODY MASS INDEX DOCD: CPT | Mod: HCNC,CPTII,S$GLB, | Performed by: FAMILY MEDICINE

## 2023-02-15 PROCEDURE — 1160F PR REVIEW ALL MEDS BY PRESCRIBER/CLIN PHARMACIST DOCUMENTED: ICD-10-PCS | Mod: HCNC,CPTII,S$GLB, | Performed by: FAMILY MEDICINE

## 2023-02-15 PROCEDURE — 1159F PR MEDICATION LIST DOCUMENTED IN MEDICAL RECORD: ICD-10-PCS | Mod: HCNC,CPTII,S$GLB, | Performed by: FAMILY MEDICINE

## 2023-02-15 PROCEDURE — 99214 OFFICE O/P EST MOD 30 MIN: CPT | Mod: HCNC,S$GLB,, | Performed by: FAMILY MEDICINE

## 2023-02-15 PROCEDURE — 1159F MED LIST DOCD IN RCRD: CPT | Mod: HCNC,CPTII,S$GLB, | Performed by: FAMILY MEDICINE

## 2023-02-15 PROCEDURE — 3077F SYST BP >= 140 MM HG: CPT | Mod: HCNC,CPTII,S$GLB, | Performed by: FAMILY MEDICINE

## 2023-02-15 PROCEDURE — 3078F DIAST BP <80 MM HG: CPT | Mod: HCNC,CPTII,S$GLB, | Performed by: FAMILY MEDICINE

## 2023-02-15 PROCEDURE — 3077F PR MOST RECENT SYSTOLIC BLOOD PRESSURE >= 140 MM HG: ICD-10-PCS | Mod: HCNC,CPTII,S$GLB, | Performed by: FAMILY MEDICINE

## 2023-02-15 RX ORDER — ATENOLOL 50 MG/1
50 TABLET ORAL 2 TIMES DAILY
Qty: 180 TABLET | Refills: 3 | Status: SHIPPED | OUTPATIENT
Start: 2023-02-15 | End: 2024-02-15

## 2023-02-15 NOTE — PROGRESS NOTES
"  Physical Exam  BP (!) 162/70   Pulse 75   Temp 97.9 °F (36.6 °C) (Oral)   Ht 5' 9" (1.753 m)   Wt 73.3 kg (161 lb 9.6 oz)   SpO2 99%   BMI 23.86 kg/m²      Office Visit    Patient Name: Lorena Contreras    : 1950  MRN: [id number]      Assessment/Plan:  Lorena Contreras is a 72 y.o. female who presents today for :    Hospital discharge follow-up  Bronchitis  Chest wall Pain  -resolved    Uncontrolled type 2 diabetes mellitus with hyperosmolar nonketotic hyperglycemia  Stage 3a chronic kidney disease  -uncontrolled, advised medication compliance and f/u with Endocrine for medication adjustment and close follow up      Hypertension associated with diabetes  -     atenoloL (TENORMIN) 50 MG tablet; Take 1 tablet (50 mg total) by mouth 2 (two) times daily.  Dispense: 180 tablet; Refill: 3  Coronary artery disease of native artery of native heart with stable angina pectoris  History of myocardial infarction  Aortic atherosclerosis  -BP uncontrolled, restart medication today  -DASH diet, regular exercise  -f/u with BP logs in 2 weeks if BP is not consistently <140/90        Follow up for worsening Sx. Urgent care/ED precautions provided.      This note was created by combination of typed  and MModal dictation.  Transcription errors may be present.  If there are any questions, please contact me.          ----------------------------------------------------------------------------------------------------------------------      HPI:  Patient Care Team:  Jorge Paris MD as PCP - General (Internal Medicine)  Javier Reilly OD as Consulting Physician (Optometry)  Aiden Zee OD as Consulting Physician (Ophthalmology)  Mary Bojorquez III, MD as Consulting Physician (Orthopedic Surgery)  [facility] (DME Provider)  Haritha Barbour MA as Care Coordinator    Lorena is a 72 y.o. female with      Patient Active Problem List   Diagnosis    Myalgia and myositis, " unspecified    Dysphagia    Anxiety    Hypertension associated with diabetes    Follicular lymphoma    Fibromyalgia    Hyperlipidemia    Cardiomegaly    Uncontrolled type 2 diabetes mellitus with hyperosmolar nonketotic hyperglycemia    Mild nonproliferative diabetic retinopathy of both eyes associated with type 2 diabetes mellitus    Coronary artery disease of native artery of native heart with stable angina pectoris    Stage 3a chronic kidney disease    Pulmonary hypertension -- echo 11/2019    History of myocardial infarction    Moderate episode of recurrent major depressive disorder    Peripheral vascular disease in diabetes mellitus -- CLEMENT 05/2019    Dog bite of right foot    Iron deficiency anemia    MSSA (methicillin susceptible Staphylococcus aureus) infection    Foot ulceration, right, with fat layer exposed    History of TIA (transient ischemic attack)    RLS (restless legs syndrome)    Osteomyelitis of foot    Aortic atherosclerosis    Osteopenia of neck of femur    Diastolic dysfunction with chronic heart failure    Proliferative diabetic retinopathy of left eye associated with type 2 diabetes mellitus, unspecified proliferative retinopathy type    Hypertensive emergency    Hypertensive encephalopathy    Status post tooth extraction     This patient is new to me       Patient with above PMHx and is here today for f/u of recent ED visit a month ago for bronchitis and chest wall pain secondary to persistent coughing. Her CXR and rib XR showed no acute abnormalities. She was discharged on cough medications, which she states has resolved her Sx. She occasionally still gets sinus drainage and congestion, but her Sx are tolerable. She recently ran out of her medications and needs refills on her Atenolol - BP elevated today. Otherwise, no other acute issues during this visit.          Additional ROS    CONST: no fever, no activity change  EYES: no vision change.   ENT: no sore throat. No dysphagia.   CV: no CP  with exertion  RESP: no SOB  GI: no N/V/diarrhea/constipation  : no urinary concerns  MSK: no new myalgias or arthralgias.   SKIN: no new rashes  NEURO: no focal deficits.   PSYCH: no new issues.   ENDOCRINE: no polyuria.           Patient Active Problem List   Diagnosis    Myalgia and myositis, unspecified    Dysphagia    Anxiety    Hypertension associated with diabetes    Follicular lymphoma    Fibromyalgia    Hyperlipidemia    Cardiomegaly    Uncontrolled type 2 diabetes mellitus with hyperosmolar nonketotic hyperglycemia    Mild nonproliferative diabetic retinopathy of both eyes associated with type 2 diabetes mellitus    Coronary artery disease of native artery of native heart with stable angina pectoris    Stage 3a chronic kidney disease    Pulmonary hypertension -- echo 11/2019    History of myocardial infarction    Moderate episode of recurrent major depressive disorder    Peripheral vascular disease in diabetes mellitus -- CLEMENT 05/2019    Dog bite of right foot    Iron deficiency anemia    MSSA (methicillin susceptible Staphylococcus aureus) infection    Foot ulceration, right, with fat layer exposed    History of TIA (transient ischemic attack)    RLS (restless legs syndrome)    Osteomyelitis of foot    Aortic atherosclerosis    Osteopenia of neck of femur    Diastolic dysfunction with chronic heart failure    Proliferative diabetic retinopathy of left eye associated with type 2 diabetes mellitus, unspecified proliferative retinopathy type    Hypertensive emergency    Hypertensive encephalopathy    Status post tooth extraction       Current Medications  Medications reviewed and updated.       Current Outpatient Medications:     acetaminophen (TYLENOL) 500 MG tablet, Take 1 tablet (500 mg total) by mouth every 4 (four) hours as needed for Pain. (Patient taking differently: Take 1,000 mg by mouth every 4 (four) hours as needed for Pain.), Disp: , Rfl: 0    albuterol (PROVENTIL/VENTOLIN HFA) 90 mcg/actuation  inhaler, Inhale 2 puffs into the lungs every 6 (six) hours as needed for Wheezing or Shortness of Breath., Disp: 18 g, Rfl: 0    amLODIPine (NORVASC) 10 MG tablet, TAKE 1 TABLET EVERY DAY, Disp: 90 tablet, Rfl: 3    ASCORBIC ACID, VITAMIN C, ORAL, Take by mouth once daily., Disp: , Rfl:     aspirin 81 MG Chew, Take 1 tablet (81 mg total) by mouth once daily., Disp: 90 tablet, Rfl: 3    atorvastatin (LIPITOR) 80 MG tablet, Take 1 tablet (80 mg total) by mouth every evening., Disp: 90 tablet, Rfl: 3    Bacillus coagulans (DIGESTIVE ADVANTAGE IMMUNE ORAL), Take by mouth., Disp: , Rfl:     blood sugar diagnostic (FREESTYLE TEST) Strp, 1 strip by Misc.(Non-Drug; Combo Route) route 4 (four) times daily., Disp: 400 each, Rfl: 4    cholecalciferol, vitamin D3, (VITAMIN D3) 50 mcg (2,000 unit) Cap, Take 2 capsules by mouth 2 (two) times daily., Disp: , Rfl:     cyanocobalamin (VITAMIN B-12) 1000 MCG tablet, Take 100 mcg by mouth once daily., Disp: , Rfl:     diclofenac sodium (VOLTAREN TOP), Apply topically., Disp: , Rfl:     dulaglutide (TRULICITY) 4.5 mg/0.5 mL pen injector, Inject 4.5 mg into the skin every 7 days., Disp: 4 pen, Rfl: 3    EPINEPHrine (EPIPEN) 0.3 mg/0.3 mL AtIn, Inject 0.3 mLs (0.3 mg total) into the muscle once. for 1 dose, Disp: 0.3 mL, Rfl: 1    ESOMEPRAZOLE MAGNESIUM ORAL, Take by mouth as needed., Disp: , Rfl:     ferrous sulfate 325 (65 FE) MG EC tablet, Take 1 tablet (325 mg total) by mouth once daily., Disp: 30 tablet, Rfl: 11    flavoring agent, bulk, (CLOVE FLAVORING) Oil, 1 Dose by Misc.(Non-Drug; Combo Route) route every 4 (four) hours as needed (dental pain)., Disp: 3.7 mL, Rfl: 0    FLUoxetine 40 MG capsule, Take 1 capsule (40 mg total) by mouth once daily. (Patient taking differently: Take 80 mg by mouth once daily.), Disp: 90 capsule, Rfl: 3    fluticasone propionate (FLONASE) 50 mcg/actuation nasal spray, 2 sprays (100 mcg total) by Each Nostril route once daily., Disp: 16 g, Rfl: 0     "gabapentin (NEURONTIN) 300 MG capsule, Take 2 capsules by mouth twice daily, Disp: 180 capsule, Rfl: 0    glimepiride (AMARYL) 2 MG tablet, Take 1 tablet (2 mg total) by mouth before breakfast., Disp: 90 tablet, Rfl: 0    hydroCHLOROthiazide (HYDRODIURIL) 25 MG tablet, Take 1 tablet (25 mg total) by mouth once daily., Disp: 90 tablet, Rfl: 3    insulin aspart U-100 (NOVOLOG FLEXPEN U-100 INSULIN) 100 unit/mL (3 mL) InPn pen, Use with sliding scale before meals: 150-200=+2, 201-250=+4; 251-300=+6; 301-350=+8, over 350=+10 units, Disp: 15 mL, Rfl: 0    insulin degludec (TRESIBA FLEXTOUCH U-200) 200 unit/mL (3 mL) insulin pen, Inject 22 units once daily and titrate to 40 units, Disp: 3 pen, Rfl: 1    isosorbide mononitrate (IMDUR) 30 MG 24 hr tablet, Take 1 tablet (30 mg total) by mouth once daily., Disp: 90 tablet, Rfl: 3    lancets 32 gauge Misc, 1 lancet by Misc.(Non-Drug; Combo Route) route 4 (four) times daily., Disp: 360 each, Rfl: 3    loratadine (CLARITIN) 10 mg tablet, Take 1 tablet (10 mg total) by mouth every morning., Disp: 60 tablet, Rfl: 0    magnesium oxide (MAG-OX) 400 mg tablet, Take 400 mg by mouth once daily., Disp: , Rfl:     meclizine (ANTIVERT) 25 mg tablet, Take 1 tablet (25 mg total) by mouth 3 (three) times daily as needed for Dizziness., Disp: 20 tablet, Rfl: 0    melatonin 10 mg Cap, Take 10 mg by mouth nightly., Disp: , Rfl:     metFORMIN (GLUCOPHAGE) 1000 MG tablet, Take 1 tablet (1,000 mg total) by mouth 2 (two) times daily with meals., Disp: 180 tablet, Rfl: 3    multivitamin/iron/folic acid (CENTRUM COMPLETE ORAL), Take by mouth., Disp: , Rfl:     nitroGLYCERIN (NITROSTAT) 0.4 MG SL tablet, PLACE 1 TABLET UNDER THE TONGUE EVERY FIVE MINUTES AS NEEDED FOR CHEST PAIN AS DIRECTED, Disp: 25 tablet, Rfl: 11    pantoprazole (PROTONIX) 40 MG tablet, Take 1 tablet by mouth once daily, Disp: 90 tablet, Rfl: 0    pen needle, diabetic (BD ULTRA-FINE SAGAR PEN NEEDLE) 32 gauge x 5/32" Ndle, USE WITH " NOVOLOG MIX FLEXPENS, Disp: 400 each, Rfl: 3    ticagrelor (BRILINTA) 90 mg tablet, Take 1 tablet (90 mg total) by mouth 2 (two) times daily., Disp: 180 tablet, Rfl: 3    TRUEPLUS LANCETS 30 gauge Misc, , Disp: , Rfl:     valsartan (DIOVAN) 160 MG tablet, Take 1 tablet (160 mg total) by mouth once daily., Disp: 90 tablet, Rfl: 3    vitamin E 1000 UNIT capsule, Take 1,000 Units by mouth once daily., Disp: , Rfl:     atenoloL (TENORMIN) 50 MG tablet, Take 1 tablet (50 mg total) by mouth 2 (two) times daily., Disp: 180 tablet, Rfl: 3    Past Surgical History:   Procedure Laterality Date    COLONOSCOPY  11/07/2012    Colon polyp found; repeat in 5 years    ELBOW SURGERY Right 2015    dislocation repair     ESOPHAGOGASTRODUODENOSCOPY  11/07/2012    atrophic gastritis, H pylori testing negative    INCISION AND DRAINAGE FOOT Right 6/2/2021    Procedure: INCISION AND DRAINAGE, FOOT, bone biopsy;  Surgeon: Quiana Penn DPM;  Location: [facility] OR;  Service: Podiatry;  Laterality: Right;    KNEE SURGERY Bilateral 2015    scoped    LEFT HEART CATHETERIZATION Left 3/29/2019    Procedure: Left heart cath;  Surgeon: Bladimir Barbosa MD;  Location: [facility] CATH LAB;  Service: Cardiology;  Laterality: Left;    LEFT HEART CATHETERIZATION Left 11/18/2019    Procedure: Left heart cath;  Surgeon: Karl Rico MD;  Location: [facility] CATH LAB;  Service: Cardiology;  Laterality: Left;    LEFT HEART CATHETERIZATION Left 1/8/2020    Procedure: Left heart cath, right radial, noon start;  Surgeon: Christos Monreal MD;  Location: [facility] CATH LAB;  Service: Cardiology;  Laterality: Left;  RN Pre Op 1-6-20.  To be admitted 1-7-20 sor Aspirin Disensitation    TONSILLECTOMY  1955    ULTRASOUND GUIDANCE  1/8/2020    Procedure: Ultrasound Guidance;  Surgeon: Christos Monreal MD;  Location: [facility] CATH LAB;  Service: Cardiology;;       Family History   Problem Relation Age of Onset    Cancer Mother         colon    Heart disease Mother     Cancer Father      "    lung    Lung cancer Brother     Diabetes Sister     Hypertension Sister     Allergy (severe) Daughter     No Known Problems Daughter     Stroke Neg Hx     Hyperlipidemia Neg Hx        Social History     Socioeconomic History    Marital status:     Number of children: 2   Occupational History    Occupation: house wife    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  2 dtr.  Lives with GS.  3 cats and a dog.  Retired.  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  Lives in house, 1 story and 4 steps up and has a ramp.      Enjoys crafting.  Unable to bowl due to myalgias.             Allergies   Review of patient's allergies indicates:   Allergen Reactions    Novolin 70/30 (semi-synthetic) Nausea And Vomiting     Severe vomiting on Relion 70/30    Shellfish containing products Swelling    Sulfa (sulfonamide antibiotics) Anaphylaxis    Talwin [pentazocine lactate] Anaphylaxis    Victoza [liraglutide] Nausea And Vomiting    Glipizide Nausea Only    Citric acid     Codeine     Influenza virus vaccines Hives    Iodine and iodide containing products Hives    Rituxan [rituximab] Hives    Zoloft [sertraline] Nausea And Vomiting             Review of Systems  See HPI        [unfilled]  BP (!) 162/70   Pulse 75   Temp 97.9 °F (36.6 °C) (Oral)   Ht 5' 9" (1.753 m)   Wt 73.3 kg (161 lb 9.6 oz)   SpO2 99%   BMI 23.86 kg/m²     GEN: NAD, well developed, pleasant, well nourished  HEENT: NCAT, PERRLA, EOMI, sclera clear, anicteric  NECK: normal, supple with midline trachea, no LAD, no thyromegaly  LUNGS: CTAB, no w/r/r, normal effort, no increased work of breathing,  HEART: RRR, normal S1 and S2, no m/r/g, no palpitations, no edema, normal pulses  ABD: s/nt/nd, NABS, no " organomegaly, no masses  SKIN: warm and dry with normal turgor, no rashes,  PSYCH: AOx3, appropriate mood and affect.   MSK: extremities warm/well perfused, normal ROM in all 4 extremities, no c/c/e.  NEURO: normal without focal findings, CN II-XII are intact.  Sensation grossly normal, gait and station normal.

## 2023-03-10 ENCOUNTER — PATIENT MESSAGE (OUTPATIENT)
Dept: ADMINISTRATIVE | Facility: HOSPITAL | Age: 73
End: 2023-03-10
Payer: MEDICARE

## 2023-04-09 ENCOUNTER — HOSPITAL ENCOUNTER (OUTPATIENT)
Facility: HOSPITAL | Age: 73
LOS: 1 days | Discharge: HOME HEALTH | End: 2023-04-11
Attending: EMERGENCY MEDICINE | Admitting: STUDENT IN AN ORGANIZED HEALTH CARE EDUCATION/TRAINING PROGRAM
Payer: MEDICARE

## 2023-04-09 DIAGNOSIS — R47.1 DYSARTHRIA: ICD-10-CM

## 2023-04-09 DIAGNOSIS — R29.810 WEAKNESS ON RIGHT SIDE OF FACE: Primary | ICD-10-CM

## 2023-04-09 DIAGNOSIS — N17.9 ACUTE RENAL FAILURE, UNSPECIFIED ACUTE RENAL FAILURE TYPE: ICD-10-CM

## 2023-04-09 DIAGNOSIS — I63.9 STROKE: ICD-10-CM

## 2023-04-09 DIAGNOSIS — R20.8 DECREASED SENSATION OF LOWER EXTREMITY: ICD-10-CM

## 2023-04-09 DIAGNOSIS — R20.8 DECREASED SENSATION OF HAND AND UPPER EXTREMITY: ICD-10-CM

## 2023-04-09 DIAGNOSIS — E11.10 DIABETIC KETOACIDOSIS WITHOUT COMA ASSOCIATED WITH TYPE 2 DIABETES MELLITUS: ICD-10-CM

## 2023-04-09 DIAGNOSIS — R73.9 HYPERGLYCEMIA: ICD-10-CM

## 2023-04-09 DIAGNOSIS — R56.9 SEIZURE-LIKE ACTIVITY: ICD-10-CM

## 2023-04-09 LAB
ALLENS TEST: ABNORMAL
DELSYS: ABNORMAL
HCO3 UR-SCNC: 23 MMOL/L (ref 24–28)
MODE: ABNORMAL
PCO2 BLDA: 46.6 MMHG (ref 35–45)
PH SMN: 7.3 [PH] (ref 7.35–7.45)
PO2 BLDA: 20 MMHG (ref 40–60)
POC BE: -4 MMOL/L
POC SATURATED O2: 28 % (ref 95–100)
POC TCO2: 24 MMOL/L (ref 24–29)
POCT GLUCOSE: >500 MG/DL (ref 70–110)
SAMPLE: ABNORMAL
SITE: ABNORMAL

## 2023-04-09 PROCEDURE — 96374 THER/PROPH/DIAG INJ IV PUSH: CPT | Mod: HCNC

## 2023-04-09 PROCEDURE — 93010 ELECTROCARDIOGRAM REPORT: CPT | Mod: HCNC,,, | Performed by: INTERNAL MEDICINE

## 2023-04-09 PROCEDURE — 82962 GLUCOSE BLOOD TEST: CPT | Mod: HCNC

## 2023-04-09 PROCEDURE — 93010 EKG 12-LEAD: ICD-10-PCS | Mod: HCNC,,, | Performed by: INTERNAL MEDICINE

## 2023-04-09 PROCEDURE — 96361 HYDRATE IV INFUSION ADD-ON: CPT | Mod: HCNC

## 2023-04-09 PROCEDURE — 93005 ELECTROCARDIOGRAM TRACING: CPT | Mod: HCNC

## 2023-04-09 PROCEDURE — 82803 BLOOD GASES ANY COMBINATION: CPT | Mod: HCNC

## 2023-04-09 PROCEDURE — 99900035 HC TECH TIME PER 15 MIN (STAT): Mod: HCNC

## 2023-04-09 PROCEDURE — 99285 EMERGENCY DEPT VISIT HI MDM: CPT | Mod: 25,HCNC

## 2023-04-09 RX ORDER — LEVETIRACETAM 500 MG/5ML
1500 INJECTION, SOLUTION, CONCENTRATE INTRAVENOUS ONCE
Status: COMPLETED | OUTPATIENT
Start: 2023-04-10 | End: 2023-04-10

## 2023-04-10 PROBLEM — N17.9 ACUTE RENAL FAILURE: Status: ACTIVE | Noted: 2023-04-10

## 2023-04-10 PROBLEM — E11.10 DKA, TYPE 2: Status: ACTIVE | Noted: 2023-04-10

## 2023-04-10 PROBLEM — D72.829 LEUKOCYTOSIS: Status: ACTIVE | Noted: 2023-04-10

## 2023-04-10 PROBLEM — R29.90 STROKE-LIKE SYMPTOMS: Status: ACTIVE | Noted: 2023-04-10

## 2023-04-10 PROBLEM — R56.9 SEIZURE-LIKE ACTIVITY: Status: ACTIVE | Noted: 2023-04-10

## 2023-04-10 LAB
ALBUMIN SERPL BCP-MCNC: 3.5 G/DL (ref 3.5–5.2)
ALLENS TEST: ABNORMAL
ALP SERPL-CCNC: 167 U/L (ref 55–135)
ALT SERPL W/O P-5'-P-CCNC: 41 U/L (ref 10–44)
ANION GAP SERPL CALC-SCNC: 10 MMOL/L (ref 8–16)
ANION GAP SERPL CALC-SCNC: 11 MMOL/L (ref 8–16)
ANION GAP SERPL CALC-SCNC: 17 MMOL/L (ref 8–16)
ANION GAP SERPL CALC-SCNC: 7 MMOL/L (ref 8–16)
ANION GAP SERPL CALC-SCNC: 8 MMOL/L (ref 8–16)
ANION GAP SERPL CALC-SCNC: 9 MMOL/L (ref 8–16)
ASCENDING AORTA: 2.56 CM
AST SERPL-CCNC: 40 U/L (ref 10–40)
AV INDEX (PROSTH): 0.36
AV MEAN GRADIENT: 13 MMHG
AV PEAK GRADIENT: 21 MMHG
AV VALVE AREA: 1.05 CM2
AV VELOCITY RATIO: 0.31
B-OH-BUTYR BLD STRIP-SCNC: 0.2 MMOL/L (ref 0–0.5)
BACTERIA #/AREA URNS HPF: ABNORMAL /HPF
BASOPHILS # BLD AUTO: 0.16 K/UL (ref 0–0.2)
BASOPHILS NFR BLD: 1.3 % (ref 0–1.9)
BILIRUB SERPL-MCNC: 0.4 MG/DL (ref 0.1–1)
BILIRUB UR QL STRIP: NEGATIVE
BSA FOR ECHO PROCEDURE: 1.86 M2
BUN SERPL-MCNC: 24 MG/DL (ref 8–23)
BUN SERPL-MCNC: 27 MG/DL (ref 8–23)
BUN SERPL-MCNC: 30 MG/DL (ref 8–23)
BUN SERPL-MCNC: 33 MG/DL (ref 8–23)
BUN SERPL-MCNC: 34 MG/DL (ref 8–23)
BUN SERPL-MCNC: 37 MG/DL (ref 8–23)
CALCIUM SERPL-MCNC: 8.4 MG/DL (ref 8.7–10.5)
CALCIUM SERPL-MCNC: 8.6 MG/DL (ref 8.7–10.5)
CALCIUM SERPL-MCNC: 8.7 MG/DL (ref 8.7–10.5)
CALCIUM SERPL-MCNC: 8.8 MG/DL (ref 8.7–10.5)
CALCIUM SERPL-MCNC: 8.9 MG/DL (ref 8.7–10.5)
CALCIUM SERPL-MCNC: 9.7 MG/DL (ref 8.7–10.5)
CHLORIDE SERPL-SCNC: 108 MMOL/L (ref 95–110)
CHLORIDE SERPL-SCNC: 111 MMOL/L (ref 95–110)
CHLORIDE SERPL-SCNC: 99 MMOL/L (ref 95–110)
CHOLEST SERPL-MCNC: 108 MG/DL (ref 120–199)
CHOLEST/HDLC SERPL: 3.2 {RATIO} (ref 2–5)
CLARITY UR: CLEAR
CO2 SERPL-SCNC: 16 MMOL/L (ref 23–29)
CO2 SERPL-SCNC: 18 MMOL/L (ref 23–29)
CO2 SERPL-SCNC: 19 MMOL/L (ref 23–29)
CO2 SERPL-SCNC: 20 MMOL/L (ref 23–29)
CO2 SERPL-SCNC: 21 MMOL/L (ref 23–29)
CO2 SERPL-SCNC: 22 MMOL/L (ref 23–29)
COLOR UR: YELLOW
CREAT SERPL-MCNC: 1.2 MG/DL (ref 0.5–1.4)
CREAT SERPL-MCNC: 1.3 MG/DL (ref 0.5–1.4)
CREAT SERPL-MCNC: 1.3 MG/DL (ref 0.5–1.4)
CREAT SERPL-MCNC: 1.4 MG/DL (ref 0.5–1.4)
CREAT SERPL-MCNC: 1.5 MG/DL (ref 0.5–1.4)
CREAT SERPL-MCNC: 2.2 MG/DL (ref 0.5–1.4)
CRP SERPL-MCNC: 3.2 MG/L (ref 0–8.2)
CV ECHO LV RWT: 0.45 CM
DELSYS: ABNORMAL
DIFFERENTIAL METHOD: ABNORMAL
DOP CALC AO PEAK VEL: 2.27 M/S
DOP CALC AO VTI: 58.3 CM
DOP CALC LVOT AREA: 2.9 CM2
DOP CALC LVOT DIAMETER: 1.92 CM
DOP CALC LVOT PEAK VEL: 0.71 M/S
DOP CALC LVOT STROKE VOLUME: 61.35 CM3
DOP CALC MV VTI: 47.9 CM
DOP CALCLVOT PEAK VEL VTI: 21.2 CM
E WAVE DECELERATION TIME: 300.44 MSEC
E/A RATIO: 0.99
E/E' RATIO: 25.27 M/S
ECHO LV POSTERIOR WALL: 1.01 CM (ref 0.6–1.1)
EJECTION FRACTION: 55 %
EOSINOPHIL # BLD AUTO: 0.2 K/UL (ref 0–0.5)
EOSINOPHIL NFR BLD: 1.9 % (ref 0–8)
ERYTHROCYTE [DISTWIDTH] IN BLOOD BY AUTOMATED COUNT: 13.2 % (ref 11.5–14.5)
EST. GFR  (NO RACE VARIABLE): 23 ML/MIN/1.73 M^2
EST. GFR  (NO RACE VARIABLE): 37 ML/MIN/1.73 M^2
EST. GFR  (NO RACE VARIABLE): 40 ML/MIN/1.73 M^2
EST. GFR  (NO RACE VARIABLE): 44 ML/MIN/1.73 M^2
EST. GFR  (NO RACE VARIABLE): 44 ML/MIN/1.73 M^2
EST. GFR  (NO RACE VARIABLE): 48 ML/MIN/1.73 M^2
ESTIMATED AVG GLUCOSE: 352 MG/DL (ref 68–131)
FRACTIONAL SHORTENING: 43 % (ref 28–44)
GLUCOSE SERPL-MCNC: 144 MG/DL (ref 70–110)
GLUCOSE SERPL-MCNC: 155 MG/DL (ref 70–110)
GLUCOSE SERPL-MCNC: 173 MG/DL (ref 70–110)
GLUCOSE SERPL-MCNC: 198 MG/DL (ref 70–110)
GLUCOSE SERPL-MCNC: 431 MG/DL (ref 70–110)
GLUCOSE SERPL-MCNC: 83 MG/DL (ref 70–110)
GLUCOSE UR QL STRIP: ABNORMAL
HBA1C MFR BLD: 13.9 % (ref 4–5.6)
HCT VFR BLD AUTO: 38.8 % (ref 37–48.5)
HDLC SERPL-MCNC: 34 MG/DL (ref 40–75)
HDLC SERPL: 31.5 % (ref 20–50)
HGB BLD-MCNC: 13 G/DL (ref 12–16)
HGB UR QL STRIP: NEGATIVE
HYALINE CASTS #/AREA URNS LPF: 0 /LPF
IMM GRANULOCYTES # BLD AUTO: 0.05 K/UL (ref 0–0.04)
IMM GRANULOCYTES NFR BLD AUTO: 0.4 % (ref 0–0.5)
INR PPP: 1 (ref 0.8–1.2)
INTERVENTRICULAR SEPTUM: 0.95 CM (ref 0.6–1.1)
IVC DIAMETER: 1.31 CM
IVRT: 145.58 MSEC
KETONES UR QL STRIP: NEGATIVE
LA MAJOR: 6.43 CM
LA MINOR: 6 CM
LA WIDTH: 4.7 CM
LACTATE SERPL-SCNC: 1.9 MMOL/L (ref 0.5–2.2)
LDH SERPL L TO P-CCNC: 3.68 MMOL/L (ref 0.5–2.2)
LDLC SERPL CALC-MCNC: 48 MG/DL (ref 63–159)
LEFT ATRIUM SIZE: 4.31 CM
LEFT ATRIUM VOLUME INDEX: 58.1 ML/M2
LEFT ATRIUM VOLUME: 106.88 CM3
LEFT INTERNAL DIMENSION IN SYSTOLE: 2.55 CM (ref 2.1–4)
LEFT VENTRICLE DIASTOLIC VOLUME INDEX: 49.28 ML/M2
LEFT VENTRICLE DIASTOLIC VOLUME: 90.68 ML
LEFT VENTRICLE MASS INDEX: 80 G/M2
LEFT VENTRICLE SYSTOLIC VOLUME INDEX: 12.7 ML/M2
LEFT VENTRICLE SYSTOLIC VOLUME: 23.45 ML
LEFT VENTRICULAR INTERNAL DIMENSION IN DIASTOLE: 4.46 CM (ref 3.5–6)
LEFT VENTRICULAR MASS: 146.94 G
LEUKOCYTE ESTERASE UR QL STRIP: ABNORMAL
LV LATERAL E/E' RATIO: 19.86 M/S
LV SEPTAL E/E' RATIO: 34.75 M/S
LVOT MG: 1.39 MMHG
LVOT MV: 0.57 CM/S
LYMPHOCYTES # BLD AUTO: 2.5 K/UL (ref 1–4.8)
LYMPHOCYTES NFR BLD: 19.9 % (ref 18–48)
MAGNESIUM SERPL-MCNC: 1.4 MG/DL (ref 1.6–2.6)
MCH RBC QN AUTO: 28.2 PG (ref 27–31)
MCHC RBC AUTO-ENTMCNC: 33.5 G/DL (ref 32–36)
MCV RBC AUTO: 84 FL (ref 82–98)
MICROSCOPIC COMMENT: ABNORMAL
MODE: ABNORMAL
MONOCYTES # BLD AUTO: 1 K/UL (ref 0.3–1)
MONOCYTES NFR BLD: 8.2 % (ref 4–15)
MV MEAN GRADIENT: 4 MMHG
MV PEAK A VEL: 1.4 M/S
MV PEAK E VEL: 1.39 M/S
MV PEAK GRADIENT: 8 MMHG
MV STENOSIS PRESSURE HALF TIME: 87.13 MS
MV VALVE AREA BY CONTINUITY EQUATION: 1.28 CM2
MV VALVE AREA P 1/2 METHOD: 2.52 CM2
NEUTROPHILS # BLD AUTO: 8.7 K/UL (ref 1.8–7.7)
NEUTROPHILS NFR BLD: 68.3 % (ref 38–73)
NITRITE UR QL STRIP: POSITIVE
NONHDLC SERPL-MCNC: 74 MG/DL
NRBC BLD-RTO: 0 /100 WBC
PH UR STRIP: 5 [PH] (ref 5–8)
PHOSPHATE SERPL-MCNC: 2.7 MG/DL (ref 2.7–4.5)
PISA TR MAX VEL: 3.01 M/S
PLATELET # BLD AUTO: 265 K/UL (ref 150–450)
PMV BLD AUTO: 12.8 FL (ref 9.2–12.9)
POCT GLUCOSE: 108 MG/DL (ref 70–110)
POCT GLUCOSE: 110 MG/DL (ref 70–110)
POCT GLUCOSE: 125 MG/DL (ref 70–110)
POCT GLUCOSE: 156 MG/DL (ref 70–110)
POCT GLUCOSE: 171 MG/DL (ref 70–110)
POCT GLUCOSE: 193 MG/DL (ref 70–110)
POCT GLUCOSE: 266 MG/DL (ref 70–110)
POCT GLUCOSE: 289 MG/DL (ref 70–110)
POCT GLUCOSE: 404 MG/DL (ref 70–110)
POCT GLUCOSE: 93 MG/DL (ref 70–110)
POCT GLUCOSE: 95 MG/DL (ref 70–110)
POTASSIUM SERPL-SCNC: 3.6 MMOL/L (ref 3.5–5.1)
POTASSIUM SERPL-SCNC: 3.9 MMOL/L (ref 3.5–5.1)
POTASSIUM SERPL-SCNC: 4.1 MMOL/L (ref 3.5–5.1)
POTASSIUM SERPL-SCNC: 4.6 MMOL/L (ref 3.5–5.1)
POTASSIUM SERPL-SCNC: 4.7 MMOL/L (ref 3.5–5.1)
POTASSIUM SERPL-SCNC: 5.3 MMOL/L (ref 3.5–5.1)
PROCALCITONIN SERPL IA-MCNC: 0.08 NG/ML
PROT SERPL-MCNC: 8.2 G/DL (ref 6–8.4)
PROT UR QL STRIP: ABNORMAL
PROTHROMBIN TIME: 10.4 SEC (ref 9–12.5)
PV PEAK VELOCITY: 0.98 CM/S
RA MAJOR: 4.41 CM
RA PRESSURE: 3 MMHG
RBC # BLD AUTO: 4.61 M/UL (ref 4–5.4)
RBC #/AREA URNS HPF: 1 /HPF (ref 0–4)
RIGHT VENTRICULAR END-DIASTOLIC DIMENSION: 4.06 CM
RV TISSUE DOPPLER FREE WALL SYSTOLIC VELOCITY 1 (APICAL 4 CHAMBER VIEW): 0.01 CM/S
SAMPLE: ABNORMAL
SINUS: 2.57 CM
SITE: ABNORMAL
SODIUM SERPL-SCNC: 132 MMOL/L (ref 136–145)
SODIUM SERPL-SCNC: 137 MMOL/L (ref 136–145)
SODIUM SERPL-SCNC: 140 MMOL/L (ref 136–145)
SP GR UR STRIP: 1.01 (ref 1–1.03)
SQUAMOUS #/AREA URNS HPF: 1 /HPF
STJ: 1.96 CM
TDI LATERAL: 0.07 M/S
TDI SEPTAL: 0.04 M/S
TDI: 0.06 M/S
TR MAX PG: 36 MMHG
TRICUSPID ANNULAR PLANE SYSTOLIC EXCURSION: 2.12 CM
TRIGL SERPL-MCNC: 130 MG/DL (ref 30–150)
TROPONIN I SERPL DL<=0.01 NG/ML-MCNC: 0.03 NG/ML (ref 0–0.03)
TROPONIN I SERPL DL<=0.01 NG/ML-MCNC: 0.03 NG/ML (ref 0–0.03)
TSH SERPL DL<=0.005 MIU/L-ACNC: 0.6 UIU/ML (ref 0.4–4)
TV PEAK GRADIENT: 0.84 MMHG
TV REST PULMONARY ARTERY PRESSURE: 39 MMHG
URN SPEC COLLECT METH UR: ABNORMAL
UROBILINOGEN UR STRIP-ACNC: NEGATIVE EU/DL
WBC # BLD AUTO: 12.73 K/UL (ref 3.9–12.7)
WBC #/AREA URNS HPF: 21 /HPF (ref 0–5)
WBC CLUMPS URNS QL MICRO: ABNORMAL
YEAST URNS QL MICRO: ABNORMAL

## 2023-04-10 PROCEDURE — 83605 ASSAY OF LACTIC ACID: CPT | Mod: HCNC | Performed by: EMERGENCY MEDICINE

## 2023-04-10 PROCEDURE — 92523 SPEECH SOUND LANG COMPREHEN: CPT | Mod: HCNC

## 2023-04-10 PROCEDURE — 92610 EVALUATE SWALLOWING FUNCTION: CPT | Mod: HCNC

## 2023-04-10 PROCEDURE — 84484 ASSAY OF TROPONIN QUANT: CPT | Mod: 91,HCNC | Performed by: STUDENT IN AN ORGANIZED HEALTH CARE EDUCATION/TRAINING PROGRAM

## 2023-04-10 PROCEDURE — S5010 5% DEXTROSE AND 0.45% SALINE: HCPCS | Mod: HCNC | Performed by: EMERGENCY MEDICINE

## 2023-04-10 PROCEDURE — 25000003 PHARM REV CODE 250: Mod: HCNC | Performed by: INTERNAL MEDICINE

## 2023-04-10 PROCEDURE — 83036 HEMOGLOBIN GLYCOSYLATED A1C: CPT | Mod: HCNC | Performed by: STUDENT IN AN ORGANIZED HEALTH CARE EDUCATION/TRAINING PROGRAM

## 2023-04-10 PROCEDURE — 63600175 PHARM REV CODE 636 W HCPCS: Mod: HCNC | Performed by: EMERGENCY MEDICINE

## 2023-04-10 PROCEDURE — 25000003 PHARM REV CODE 250: Mod: HCNC | Performed by: NURSE PRACTITIONER

## 2023-04-10 PROCEDURE — 85025 COMPLETE CBC W/AUTO DIFF WBC: CPT | Mod: HCNC | Performed by: NURSE PRACTITIONER

## 2023-04-10 PROCEDURE — 63600175 PHARM REV CODE 636 W HCPCS: Mod: HCNC | Performed by: STUDENT IN AN ORGANIZED HEALTH CARE EDUCATION/TRAINING PROGRAM

## 2023-04-10 PROCEDURE — 85610 PROTHROMBIN TIME: CPT | Mod: HCNC | Performed by: EMERGENCY MEDICINE

## 2023-04-10 PROCEDURE — 25000003 PHARM REV CODE 250: Mod: HCNC | Performed by: STUDENT IN AN ORGANIZED HEALTH CARE EDUCATION/TRAINING PROGRAM

## 2023-04-10 PROCEDURE — 99222 1ST HOSP IP/OBS MODERATE 55: CPT | Mod: HCNC,,, | Performed by: PSYCHIATRY & NEUROLOGY

## 2023-04-10 PROCEDURE — 80061 LIPID PANEL: CPT | Mod: HCNC | Performed by: EMERGENCY MEDICINE

## 2023-04-10 PROCEDURE — 84100 ASSAY OF PHOSPHORUS: CPT | Mod: HCNC | Performed by: STUDENT IN AN ORGANIZED HEALTH CARE EDUCATION/TRAINING PROGRAM

## 2023-04-10 PROCEDURE — 97161 PT EVAL LOW COMPLEX 20 MIN: CPT | Mod: HCNC

## 2023-04-10 PROCEDURE — 63600175 PHARM REV CODE 636 W HCPCS: Mod: HCNC | Performed by: PSYCHIATRY & NEUROLOGY

## 2023-04-10 PROCEDURE — 82010 KETONE BODYS QUAN: CPT | Mod: HCNC | Performed by: NURSE PRACTITIONER

## 2023-04-10 PROCEDURE — 84484 ASSAY OF TROPONIN QUANT: CPT | Mod: HCNC | Performed by: EMERGENCY MEDICINE

## 2023-04-10 PROCEDURE — A9585 GADOBUTROL INJECTION: HCPCS | Mod: HCNC | Performed by: STUDENT IN AN ORGANIZED HEALTH CARE EDUCATION/TRAINING PROGRAM

## 2023-04-10 PROCEDURE — 99900035 HC TECH TIME PER 15 MIN (STAT): Mod: HCNC

## 2023-04-10 PROCEDURE — 95700 EEG CONT REC W/VID EEG TECH: CPT | Mod: HCNC

## 2023-04-10 PROCEDURE — 25000003 PHARM REV CODE 250: Mod: HCNC | Performed by: EMERGENCY MEDICINE

## 2023-04-10 PROCEDURE — 99222 PR INITIAL HOSPITAL CARE,LEVL II: ICD-10-PCS | Mod: HCNC,,, | Performed by: PSYCHIATRY & NEUROLOGY

## 2023-04-10 PROCEDURE — 95720 EEG PHY/QHP EA INCR W/VEEG: CPT | Mod: HCNC,,, | Performed by: PSYCHIATRY & NEUROLOGY

## 2023-04-10 PROCEDURE — 83735 ASSAY OF MAGNESIUM: CPT | Mod: HCNC | Performed by: STUDENT IN AN ORGANIZED HEALTH CARE EDUCATION/TRAINING PROGRAM

## 2023-04-10 PROCEDURE — 80048 BASIC METABOLIC PNL TOTAL CA: CPT | Mod: 91,HCNC,XB | Performed by: STUDENT IN AN ORGANIZED HEALTH CARE EDUCATION/TRAINING PROGRAM

## 2023-04-10 PROCEDURE — 95720 PR EEG, W/VIDEO, CONT RECORD, I&R, >12<26 HRS: ICD-10-PCS | Mod: HCNC,,, | Performed by: PSYCHIATRY & NEUROLOGY

## 2023-04-10 PROCEDURE — 86140 C-REACTIVE PROTEIN: CPT | Mod: HCNC | Performed by: STUDENT IN AN ORGANIZED HEALTH CARE EDUCATION/TRAINING PROGRAM

## 2023-04-10 PROCEDURE — 97165 OT EVAL LOW COMPLEX 30 MIN: CPT | Mod: HCNC

## 2023-04-10 PROCEDURE — 25500020 PHARM REV CODE 255: Mod: HCNC | Performed by: STUDENT IN AN ORGANIZED HEALTH CARE EDUCATION/TRAINING PROGRAM

## 2023-04-10 PROCEDURE — 36415 COLL VENOUS BLD VENIPUNCTURE: CPT | Mod: HCNC | Performed by: STUDENT IN AN ORGANIZED HEALTH CARE EDUCATION/TRAINING PROGRAM

## 2023-04-10 PROCEDURE — 83605 ASSAY OF LACTIC ACID: CPT | Mod: HCNC

## 2023-04-10 PROCEDURE — 80053 COMPREHEN METABOLIC PANEL: CPT | Mod: HCNC | Performed by: NURSE PRACTITIONER

## 2023-04-10 PROCEDURE — 87040 BLOOD CULTURE FOR BACTERIA: CPT | Mod: HCNC | Performed by: EMERGENCY MEDICINE

## 2023-04-10 PROCEDURE — 82962 GLUCOSE BLOOD TEST: CPT | Mod: HCNC

## 2023-04-10 PROCEDURE — 94761 N-INVAS EAR/PLS OXIMETRY MLT: CPT | Mod: HCNC

## 2023-04-10 PROCEDURE — 84443 ASSAY THYROID STIM HORMONE: CPT | Mod: HCNC | Performed by: EMERGENCY MEDICINE

## 2023-04-10 PROCEDURE — 11000001 HC ACUTE MED/SURG PRIVATE ROOM: Mod: HCNC

## 2023-04-10 PROCEDURE — 84145 PROCALCITONIN (PCT): CPT | Mod: HCNC | Performed by: STUDENT IN AN ORGANIZED HEALTH CARE EDUCATION/TRAINING PROGRAM

## 2023-04-10 PROCEDURE — 81000 URINALYSIS NONAUTO W/SCOPE: CPT | Mod: HCNC | Performed by: NURSE PRACTITIONER

## 2023-04-10 PROCEDURE — 87086 URINE CULTURE/COLONY COUNT: CPT | Mod: HCNC | Performed by: NURSE PRACTITIONER

## 2023-04-10 PROCEDURE — 95714 VEEG EA 12-26 HR UNMNTR: CPT | Mod: HCNC

## 2023-04-10 RX ORDER — SODIUM CHLORIDE 0.9 % (FLUSH) 0.9 %
10 SYRINGE (ML) INJECTION
Status: DISCONTINUED | OUTPATIENT
Start: 2023-04-10 | End: 2023-04-11 | Stop reason: HOSPADM

## 2023-04-10 RX ORDER — MIDAZOLAM HYDROCHLORIDE 1 MG/ML
1 INJECTION INTRAMUSCULAR; INTRAVENOUS ONCE AS NEEDED
Status: COMPLETED | OUTPATIENT
Start: 2023-04-10 | End: 2023-04-10

## 2023-04-10 RX ORDER — HEPARIN SODIUM 5000 [USP'U]/ML
5000 INJECTION, SOLUTION INTRAVENOUS; SUBCUTANEOUS EVERY 8 HOURS
Status: DISCONTINUED | OUTPATIENT
Start: 2023-04-10 | End: 2023-04-11 | Stop reason: HOSPADM

## 2023-04-10 RX ORDER — POTASSIUM CHLORIDE 7.45 MG/ML
60 INJECTION INTRAVENOUS
Status: DISCONTINUED | OUTPATIENT
Start: 2023-04-10 | End: 2023-04-11 | Stop reason: HOSPADM

## 2023-04-10 RX ORDER — TALC
6 POWDER (GRAM) TOPICAL NIGHTLY PRN
Status: DISCONTINUED | OUTPATIENT
Start: 2023-04-10 | End: 2023-04-11 | Stop reason: HOSPADM

## 2023-04-10 RX ORDER — DEXTROSE MONOHYDRATE 100 MG/ML
INJECTION, SOLUTION INTRAVENOUS
Status: DISCONTINUED | OUTPATIENT
Start: 2023-04-10 | End: 2023-04-11 | Stop reason: HOSPADM

## 2023-04-10 RX ORDER — POTASSIUM CHLORIDE 7.45 MG/ML
80 INJECTION INTRAVENOUS
Status: DISCONTINUED | OUTPATIENT
Start: 2023-04-10 | End: 2023-04-11 | Stop reason: HOSPADM

## 2023-04-10 RX ORDER — SODIUM CHLORIDE 9 MG/ML
125 INJECTION, SOLUTION INTRAVENOUS CONTINUOUS
Status: DISCONTINUED | OUTPATIENT
Start: 2023-04-10 | End: 2023-04-10

## 2023-04-10 RX ORDER — FLUOXETINE HYDROCHLORIDE 20 MG/1
40 CAPSULE ORAL DAILY
Status: DISCONTINUED | OUTPATIENT
Start: 2023-04-10 | End: 2023-04-11 | Stop reason: HOSPADM

## 2023-04-10 RX ORDER — CEFTRIAXONE 2 G/50ML
2 INJECTION, SOLUTION INTRAVENOUS
Status: DISCONTINUED | OUTPATIENT
Start: 2023-04-10 | End: 2023-04-11 | Stop reason: HOSPADM

## 2023-04-10 RX ORDER — LEVETIRACETAM 500 MG/5ML
500 INJECTION, SOLUTION, CONCENTRATE INTRAVENOUS EVERY 12 HOURS
Status: DISCONTINUED | OUTPATIENT
Start: 2023-04-10 | End: 2023-04-11 | Stop reason: HOSPADM

## 2023-04-10 RX ORDER — ATENOLOL 25 MG/1
50 TABLET ORAL 2 TIMES DAILY
Status: DISCONTINUED | OUTPATIENT
Start: 2023-04-10 | End: 2023-04-11 | Stop reason: HOSPADM

## 2023-04-10 RX ORDER — ASPIRIN 325 MG
325 TABLET ORAL
Status: DISCONTINUED | OUTPATIENT
Start: 2023-04-10 | End: 2023-04-10

## 2023-04-10 RX ORDER — POTASSIUM CHLORIDE 7.45 MG/ML
40 INJECTION INTRAVENOUS
Status: DISCONTINUED | OUTPATIENT
Start: 2023-04-10 | End: 2023-04-11 | Stop reason: HOSPADM

## 2023-04-10 RX ORDER — GABAPENTIN 300 MG/1
600 CAPSULE ORAL 2 TIMES DAILY
Status: DISCONTINUED | OUTPATIENT
Start: 2023-04-10 | End: 2023-04-11 | Stop reason: HOSPADM

## 2023-04-10 RX ORDER — DEXTROSE MONOHYDRATE AND SODIUM CHLORIDE 5; .45 G/100ML; G/100ML
INJECTION, SOLUTION INTRAVENOUS CONTINUOUS PRN
Status: DISCONTINUED | OUTPATIENT
Start: 2023-04-10 | End: 2023-04-11 | Stop reason: HOSPADM

## 2023-04-10 RX ORDER — IBUPROFEN 200 MG
24 TABLET ORAL
Status: DISCONTINUED | OUTPATIENT
Start: 2023-04-10 | End: 2023-04-11 | Stop reason: HOSPADM

## 2023-04-10 RX ORDER — INSULIN ASPART 100 [IU]/ML
0-5 INJECTION, SOLUTION INTRAVENOUS; SUBCUTANEOUS
Status: DISCONTINUED | OUTPATIENT
Start: 2023-04-10 | End: 2023-04-11 | Stop reason: HOSPADM

## 2023-04-10 RX ORDER — CEFTRIAXONE 2 G/50ML
2 INJECTION, SOLUTION INTRAVENOUS ONCE
Status: COMPLETED | OUTPATIENT
Start: 2023-04-10 | End: 2023-04-10

## 2023-04-10 RX ORDER — MAGNESIUM SULFATE HEPTAHYDRATE 40 MG/ML
2 INJECTION, SOLUTION INTRAVENOUS ONCE
Status: COMPLETED | OUTPATIENT
Start: 2023-04-10 | End: 2023-04-10

## 2023-04-10 RX ORDER — LIDOCAINE 50 MG/G
1 PATCH TOPICAL
Status: DISCONTINUED | OUTPATIENT
Start: 2023-04-10 | End: 2023-04-11 | Stop reason: HOSPADM

## 2023-04-10 RX ORDER — GLUCAGON 1 MG
1 KIT INJECTION
Status: DISCONTINUED | OUTPATIENT
Start: 2023-04-10 | End: 2023-04-11 | Stop reason: HOSPADM

## 2023-04-10 RX ORDER — GADOBUTROL 604.72 MG/ML
7.5 INJECTION INTRAVENOUS
Status: COMPLETED | OUTPATIENT
Start: 2023-04-10 | End: 2023-04-10

## 2023-04-10 RX ORDER — MUPIROCIN 20 MG/G
OINTMENT TOPICAL 2 TIMES DAILY
Status: DISCONTINUED | OUTPATIENT
Start: 2023-04-10 | End: 2023-04-11 | Stop reason: HOSPADM

## 2023-04-10 RX ORDER — NAPROXEN SODIUM 220 MG/1
81 TABLET, FILM COATED ORAL DAILY
Status: DISCONTINUED | OUTPATIENT
Start: 2023-04-10 | End: 2023-04-11 | Stop reason: HOSPADM

## 2023-04-10 RX ORDER — ASPIRIN 300 MG/1
300 SUPPOSITORY RECTAL ONCE
Status: COMPLETED | OUTPATIENT
Start: 2023-04-10 | End: 2023-04-10

## 2023-04-10 RX ORDER — SODIUM CHLORIDE 9 MG/ML
INJECTION, SOLUTION INTRAVENOUS CONTINUOUS
Status: ACTIVE | OUTPATIENT
Start: 2023-04-10 | End: 2023-04-11

## 2023-04-10 RX ORDER — LEVETIRACETAM 500 MG/5ML
1000 INJECTION, SOLUTION, CONCENTRATE INTRAVENOUS ONCE
Status: COMPLETED | OUTPATIENT
Start: 2023-04-10 | End: 2023-04-10

## 2023-04-10 RX ORDER — ONDANSETRON 2 MG/ML
4 INJECTION INTRAMUSCULAR; INTRAVENOUS EVERY 6 HOURS PRN
Status: DISCONTINUED | OUTPATIENT
Start: 2023-04-10 | End: 2023-04-11 | Stop reason: HOSPADM

## 2023-04-10 RX ORDER — ACETAMINOPHEN 325 MG/1
650 TABLET ORAL EVERY 4 HOURS PRN
Status: DISCONTINUED | OUTPATIENT
Start: 2023-04-10 | End: 2023-04-11 | Stop reason: HOSPADM

## 2023-04-10 RX ORDER — ACETAMINOPHEN 650 MG/1
650 SUPPOSITORY RECTAL ONCE
Status: COMPLETED | OUTPATIENT
Start: 2023-04-10 | End: 2023-04-10

## 2023-04-10 RX ORDER — ATORVASTATIN CALCIUM 40 MG/1
80 TABLET, FILM COATED ORAL NIGHTLY
Status: DISCONTINUED | OUTPATIENT
Start: 2023-04-10 | End: 2023-04-11 | Stop reason: HOSPADM

## 2023-04-10 RX ORDER — IBUPROFEN 200 MG
16 TABLET ORAL
Status: DISCONTINUED | OUTPATIENT
Start: 2023-04-10 | End: 2023-04-11 | Stop reason: HOSPADM

## 2023-04-10 RX ORDER — SODIUM CHLORIDE 9 MG/ML
1000 INJECTION, SOLUTION INTRAVENOUS CONTINUOUS
Status: DISCONTINUED | OUTPATIENT
Start: 2023-04-10 | End: 2023-04-10

## 2023-04-10 RX ORDER — PANTOPRAZOLE SODIUM 40 MG/1
40 TABLET, DELAYED RELEASE ORAL DAILY
Status: DISCONTINUED | OUTPATIENT
Start: 2023-04-10 | End: 2023-04-11 | Stop reason: HOSPADM

## 2023-04-10 RX ADMIN — ATENOLOL 50 MG: 25 TABLET ORAL at 09:04

## 2023-04-10 RX ADMIN — POTASSIUM CHLORIDE 40 MEQ: 7.46 INJECTION, SOLUTION INTRAVENOUS at 06:04

## 2023-04-10 RX ADMIN — ATORVASTATIN CALCIUM 80 MG: 40 TABLET, FILM COATED ORAL at 09:04

## 2023-04-10 RX ADMIN — LEVETIRACETAM 1000 MG: 100 INJECTION, SOLUTION INTRAVENOUS at 05:04

## 2023-04-10 RX ADMIN — LEVETIRACETAM 500 MG: 100 INJECTION, SOLUTION INTRAVENOUS at 10:04

## 2023-04-10 RX ADMIN — SODIUM CHLORIDE 1000 ML: 9 INJECTION, SOLUTION INTRAVENOUS at 12:04

## 2023-04-10 RX ADMIN — LEVETIRACETAM 1500 MG: 100 INJECTION, SOLUTION INTRAVENOUS at 12:04

## 2023-04-10 RX ADMIN — SODIUM CHLORIDE: 9 INJECTION, SOLUTION INTRAVENOUS at 10:04

## 2023-04-10 RX ADMIN — LIDOCAINE 1 PATCH: 50 PATCH CUTANEOUS at 03:04

## 2023-04-10 RX ADMIN — HEPARIN SODIUM 5000 UNITS: 5000 INJECTION INTRAVENOUS; SUBCUTANEOUS at 03:04

## 2023-04-10 RX ADMIN — TICAGRELOR 90 MG: 90 TABLET ORAL at 10:04

## 2023-04-10 RX ADMIN — PANTOPRAZOLE SODIUM 40 MG: 40 TABLET, DELAYED RELEASE ORAL at 10:04

## 2023-04-10 RX ADMIN — FLUOXETINE 40 MG: 20 CAPSULE ORAL at 10:04

## 2023-04-10 RX ADMIN — MIDAZOLAM 1 MG: 1 INJECTION INTRAMUSCULAR; INTRAVENOUS at 01:04

## 2023-04-10 RX ADMIN — MUPIROCIN: 20 OINTMENT TOPICAL at 09:04

## 2023-04-10 RX ADMIN — INSULIN HUMAN 0.1 UNITS/KG/HR: 1 INJECTION, SOLUTION INTRAVENOUS at 03:04

## 2023-04-10 RX ADMIN — TICAGRELOR 90 MG: 90 TABLET ORAL at 09:04

## 2023-04-10 RX ADMIN — ASPIRIN 300 MG: 300 SUPPOSITORY RECTAL at 03:04

## 2023-04-10 RX ADMIN — ACETAMINOPHEN 650 MG: 650 SUPPOSITORY RECTAL at 03:04

## 2023-04-10 RX ADMIN — SODIUM CHLORIDE: 9 INJECTION, SOLUTION INTRAVENOUS at 06:04

## 2023-04-10 RX ADMIN — SODIUM CHLORIDE 125 ML/HR: 9 INJECTION, SOLUTION INTRAVENOUS at 02:04

## 2023-04-10 RX ADMIN — CEFTRIAXONE 2 G: 2 INJECTION, SOLUTION INTRAVENOUS at 01:04

## 2023-04-10 RX ADMIN — GABAPENTIN 600 MG: 300 CAPSULE ORAL at 09:04

## 2023-04-10 RX ADMIN — HEPARIN SODIUM 5000 UNITS: 5000 INJECTION INTRAVENOUS; SUBCUTANEOUS at 05:04

## 2023-04-10 RX ADMIN — SODIUM CHLORIDE 125 ML/HR: 9 INJECTION, SOLUTION INTRAVENOUS at 03:04

## 2023-04-10 RX ADMIN — DEXTROSE AND SODIUM CHLORIDE: 5; 450 INJECTION, SOLUTION INTRAVENOUS at 03:04

## 2023-04-10 RX ADMIN — MAGNESIUM SULFATE HEPTAHYDRATE 2 G: 40 INJECTION, SOLUTION INTRAVENOUS at 11:04

## 2023-04-10 RX ADMIN — ASPIRIN 81 MG CHEWABLE TABLET 81 MG: 81 TABLET CHEWABLE at 10:04

## 2023-04-10 RX ADMIN — CEFTRIAXONE 2 G: 2 INJECTION, SOLUTION INTRAVENOUS at 03:04

## 2023-04-10 RX ADMIN — INSULIN DETEMIR 6 UNITS: 100 INJECTION, SOLUTION SUBCUTANEOUS at 10:04

## 2023-04-10 RX ADMIN — MUPIROCIN: 20 OINTMENT TOPICAL at 11:04

## 2023-04-10 RX ADMIN — SODIUM CHLORIDE, POTASSIUM CHLORIDE, SODIUM LACTATE AND CALCIUM CHLORIDE 2190 ML: 600; 310; 30; 20 INJECTION, SOLUTION INTRAVENOUS at 01:04

## 2023-04-10 RX ADMIN — ATENOLOL 50 MG: 25 TABLET ORAL at 10:04

## 2023-04-10 RX ADMIN — GADOBUTROL 7.5 ML: 604.72 INJECTION INTRAVENOUS at 02:04

## 2023-04-10 RX ADMIN — INSULIN HUMAN 0.1 UNITS/KG/HR: 1 INJECTION, SOLUTION INTRAVENOUS at 01:04

## 2023-04-10 RX ADMIN — GABAPENTIN 600 MG: 300 CAPSULE ORAL at 10:04

## 2023-04-10 RX ADMIN — ACETAMINOPHEN 650 MG: 325 TABLET ORAL at 04:04

## 2023-04-10 RX ADMIN — HEPARIN SODIUM 5000 UNITS: 5000 INJECTION INTRAVENOUS; SUBCUTANEOUS at 09:04

## 2023-04-10 NOTE — SUBJECTIVE & OBJECTIVE
Past Medical History:   Diagnosis Date    Allergy     Altered mental status 06/19/2022    DYSARTHRIA, SPASTIC MOVEMENTS & DIFFICULTY SWALLOWING    Anemia     Anxiety     Arthritis     Cataract     both removed    Colon polyps     Coronary artery disease     Depression     Diabetes mellitus, type II     Disorder of kidney and ureter     Fibromyalgia     Follicular lymphoma     GERD (gastroesophageal reflux disease)     HTN (hypertension)     Hyperlipidemia     MI (myocardial infarction) 03/2019    Personal history of colonic polyps     Restless leg syndrome     Stroke        Past Surgical History:   Procedure Laterality Date    COLONOSCOPY  11/07/2012    Colon polyp found; repeat in 5 years    ELBOW SURGERY Right 2015    dislocation repair     ESOPHAGOGASTRODUODENOSCOPY  11/07/2012    atrophic gastritis, H pylori testing negative    INCISION AND DRAINAGE FOOT Right 6/2/2021    Procedure: INCISION AND DRAINAGE, FOOT, bone biopsy;  Surgeon: Quiana Penn DPM;  Location: [facility] OR;  Service: Podiatry;  Laterality: Right;    KNEE SURGERY Bilateral 2015    scoped    LEFT HEART CATHETERIZATION Left 3/29/2019    Procedure: Left heart cath;  Surgeon: Bladimir Barbosa MD;  Location: [facility] CATH LAB;  Service: Cardiology;  Laterality: Left;    LEFT HEART CATHETERIZATION Left 11/18/2019    Procedure: Left heart cath;  Surgeon: Karl Rico MD;  Location: [facility] CATH LAB;  Service: Cardiology;  Laterality: Left;    LEFT HEART CATHETERIZATION Left 1/8/2020    Procedure: Left heart cath, right radial, noon start;  Surgeon: Christos Monreal MD;  Location: [facility] CATH LAB;  Service: Cardiology;  Laterality: Left;  RN Pre Op 1-6-20.  To be admitted 1-7-20 sor Aspirin Disensitation    TONSILLECTOMY  1955    ULTRASOUND GUIDANCE  1/8/2020    Procedure: Ultrasound Guidance;  Surgeon: Christos Monreal MD;  Location: [facility] CATH LAB;  Service: Cardiology;;       Review of patient's allergies indicates:   Allergen Reactions    Novolin 70/30  (semi-synthetic) Nausea And Vomiting     Severe vomiting on Relion 70/30    Shellfish containing products Swelling    Sulfa (sulfonamide antibiotics) Anaphylaxis    Talwin [pentazocine lactate] Anaphylaxis    Victoza [liraglutide] Nausea And Vomiting    Glipizide Nausea Only    Citric acid     Codeine     Influenza virus vaccines Hives    Iodine and iodide containing products Hives    Rituxan [rituximab] Hives    Zoloft [sertraline] Nausea And Vomiting       No current facility-administered medications on file prior to encounter.     Current Outpatient Medications on File Prior to Encounter   Medication Sig    amLODIPine (NORVASC) 10 MG tablet TAKE 1 TABLET EVERY DAY    aspirin 81 MG Chew Take 1 tablet (81 mg total) by mouth once daily.    atenoloL (TENORMIN) 50 MG tablet Take 1 tablet (50 mg total) by mouth 2 (two) times daily.    atorvastatin (LIPITOR) 80 MG tablet Take 1 tablet (80 mg total) by mouth every evening.    FLUoxetine 40 MG capsule Take 1 capsule (40 mg total) by mouth once daily. (Patient taking differently: Take 80 mg by mouth once daily.)    gabapentin (NEURONTIN) 300 MG capsule Take 2 capsules by mouth twice daily    isosorbide mononitrate (IMDUR) 30 MG 24 hr tablet Take 1 tablet (30 mg total) by mouth once daily.    pantoprazole (PROTONIX) 40 MG tablet Take 1 tablet by mouth once daily    ticagrelor (BRILINTA) 90 mg tablet Take 1 tablet (90 mg total) by mouth 2 (two) times daily.    acetaminophen (TYLENOL) 500 MG tablet Take 1 tablet (500 mg total) by mouth every 4 (four) hours as needed for Pain. (Patient taking differently: Take 1,000 mg by mouth every 4 (four) hours as needed for Pain.)    albuterol (PROVENTIL/VENTOLIN HFA) 90 mcg/actuation inhaler Inhale 2 puffs into the lungs every 6 (six) hours as needed for Wheezing or Shortness of Breath.    ASCORBIC ACID, VITAMIN C, ORAL Take by mouth once daily.    Bacillus coagulans (DIGESTIVE ADVANTAGE IMMUNE ORAL) Take by mouth.    blood sugar  diagnostic (FREESTYLE TEST) Strp 1 strip by Misc.(Non-Drug; Combo Route) route 4 (four) times daily.    cholecalciferol, vitamin D3, (VITAMIN D3) 50 mcg (2,000 unit) Cap Take 2 capsules by mouth 2 (two) times daily.    cyanocobalamin (VITAMIN B-12) 1000 MCG tablet Take 100 mcg by mouth once daily.    diclofenac sodium (VOLTAREN TOP) Apply topically.    dulaglutide (TRULICITY) 4.5 mg/0.5 mL pen injector Inject 4.5 mg into the skin every 7 days.    EPINEPHrine (EPIPEN) 0.3 mg/0.3 mL AtIn Inject 0.3 mLs (0.3 mg total) into the muscle once. for 1 dose    ESOMEPRAZOLE MAGNESIUM ORAL Take by mouth as needed.    ferrous sulfate 325 (65 FE) MG EC tablet Take 1 tablet (325 mg total) by mouth once daily.    flavoring agent, bulk, (CLOVE FLAVORING) Oil 1 Dose by Misc.(Non-Drug; Combo Route) route every 4 (four) hours as needed (dental pain).    fluticasone propionate (FLONASE) 50 mcg/actuation nasal spray 2 sprays (100 mcg total) by Each Nostril route once daily.    glimepiride (AMARYL) 2 MG tablet Take 1 tablet (2 mg total) by mouth before breakfast.    hydroCHLOROthiazide (HYDRODIURIL) 25 MG tablet Take 1 tablet (25 mg total) by mouth once daily.    insulin aspart U-100 (NOVOLOG FLEXPEN U-100 INSULIN) 100 unit/mL (3 mL) InPn pen Use with sliding scale before meals: 150-200=+2, 201-250=+4; 251-300=+6; 301-350=+8, over 350=+10 units    insulin degludec (TRESIBA FLEXTOUCH U-200) 200 unit/mL (3 mL) insulin pen Inject 22 units once daily and titrate to 40 units    lancets 32 gauge Misc 1 lancet by Misc.(Non-Drug; Combo Route) route 4 (four) times daily.    loratadine (CLARITIN) 10 mg tablet Take 1 tablet (10 mg total) by mouth every morning.    magnesium oxide (MAG-OX) 400 mg tablet Take 400 mg by mouth once daily.    meclizine (ANTIVERT) 25 mg tablet Take 1 tablet (25 mg total) by mouth 3 (three) times daily as needed for Dizziness.    melatonin 10 mg Cap Take 10 mg by mouth nightly.    metFORMIN (GLUCOPHAGE) 1000 MG tablet Take  "1 tablet (1,000 mg total) by mouth 2 (two) times daily with meals.    multivitamin/iron/folic acid (CENTRUM COMPLETE ORAL) Take by mouth.    nitroGLYCERIN (NITROSTAT) 0.4 MG SL tablet PLACE 1 TABLET UNDER THE TONGUE EVERY FIVE MINUTES AS NEEDED FOR CHEST PAIN AS DIRECTED    pen needle, diabetic (BD ULTRA-FINE SAGAR PEN NEEDLE) 32 gauge x 5/32" Ndle USE WITH NOVOLOG MIX FLEXPENS    TRUEPLUS LANCETS 30 gauge Misc     valsartan (DIOVAN) 160 MG tablet Take 1 tablet (160 mg total) by mouth once daily.    vitamin E 1000 UNIT capsule Take 1,000 Units by mouth once daily.     Family History       Problem Relation (Age of Onset)    Allergy (severe) Daughter    Cancer Mother, Father    Diabetes Sister    Heart disease Mother    Hypertension Sister    Lung cancer Brother    No Known Problems Daughter          Tobacco Use    Smoking status: Never    Smokeless tobacco: Never   Substance and Sexual Activity    Alcohol use: Not Currently    Drug use: Never    Sexual activity: Not Currently     Partners: Male     Review of Systems   Constitutional:  Positive for activity change and appetite change.   HENT:  Positive for dental problem and drooling.    Eyes: Negative.    Respiratory: Negative.     Cardiovascular: Negative.    Gastrointestinal: Negative.    Endocrine: Negative.    Genitourinary: Negative.    Musculoskeletal: Negative.    Skin: Negative.    Allergic/Immunologic: Negative.    Neurological:  Positive for facial asymmetry, speech difficulty and weakness.   Psychiatric/Behavioral: Negative.     Objective:     Vital Signs (Most Recent):  Temp: 98 °F (36.7 °C) (04/09/23 2332)  Pulse: 78 (04/10/23 0105)  Resp: (!) 28 (04/10/23 0105)  BP: (!) 124/56 (04/10/23 0105)  SpO2: 100 % (04/10/23 0105)   Vital Signs (24h Range):  Temp:  [98 °F (36.7 °C)] 98 °F (36.7 °C)  Pulse:  [78-90] 78  Resp:  [20-30] 28  SpO2:  [96 %-100 %] 100 %  BP: (123-138)/(56-59) 124/56     Weight: 73 kg (160 lb 15 oz)  Body mass index is 25.21 " kg/m².    Physical Exam  Vitals and nursing note reviewed.   Constitutional:       General: She is not in acute distress.     Appearance: Normal appearance. She is not ill-appearing.   HENT:      Head: Normocephalic and atraumatic.      Nose: Nose normal.      Mouth/Throat:      Mouth: Mucous membranes are moist.   Eyes:      Extraocular Movements: Extraocular movements intact.   Cardiovascular:      Rate and Rhythm: Normal rate.      Pulses: Normal pulses.      Heart sounds: No murmur heard.  Pulmonary:      Effort: Pulmonary effort is normal. No respiratory distress.   Abdominal:      General: Abdomen is flat.      Palpations: Abdomen is soft.      Tenderness: There is no abdominal tenderness.   Musculoskeletal:      Right lower leg: No edema.      Left lower leg: No edema.   Skin:     General: Skin is warm.      Capillary Refill: Capillary refill takes less than 2 seconds.   Neurological:      Mental Status: She is alert.      Comments: Left sided facial droop, dysarthria   Right upper extremity 4/5 strength    Psychiatric:         Mood and Affect: Mood normal.           Significant Labs: All pertinent labs within the past 24 hours have been reviewed.    Significant Imaging: I have reviewed all pertinent imaging results/findings within the past 24 hours.

## 2023-04-10 NOTE — CONSULTS
Nutrition-Related Diabetes Education      Time Spent: 15 mins    Learners: Lorena Contreras    Current HbA1c:  Hemoglobin A1C   Date Value Ref Range Status   04/10/2023 13.9 (H) 4.0 - 5.6 % Final     Comment:     ADA Screening Guidelines:  5.7-6.4%  Consistent with prediabetes  >or=6.5%  Consistent with diabetes    High levels of fetal hemoglobin interfere with the HbA1C  assay. Heterozygous hemoglobin variants (HbS, HgC, etc)do  not significantly interfere with this assay.   However, presence of multiple variants may affect accuracy.     05/26/2022 >14.0 (H) 4.0 - 5.6 % Final     Comment:     ADA Screening Guidelines:  5.7-6.4%  Consistent with prediabetes  >or=6.5%  Consistent with diabetes    High levels of fetal hemoglobin interfere with the HbA1C  assay. Heterozygous hemoglobin variants (HbS, HgC, etc)do  not significantly interfere with this assay.   However, presence of multiple variants may affect accuracy.     02/11/2022 9.9 (H) 4.0 - 5.6 % Final     Comment:     ADA Screening Guidelines:  5.7-6.4%  Consistent with prediabetes  >or=6.5%  Consistent with diabetes    High levels of fetal hemoglobin interfere with the HbA1C  assay. Heterozygous hemoglobin variants (HbS, HgC, etc)do  not significantly interfere with this assay.   However, presence of multiple variants may affect accuracy.          Is patient aware of their A1c and their goal A1c?      yes    Home diabetes medication(s):  Diabetes Medications               dulaglutide (TRULICITY) 4.5 mg/0.5 mL pen injector Inject 4.5 mg into the skin every 7 days.    glimepiride (AMARYL) 2 MG tablet Take 1 tablet (2 mg total) by mouth before breakfast.    insulin aspart U-100 (NOVOLOG FLEXPEN U-100 INSULIN) 100 unit/mL (3 mL) InPn pen Use with sliding scale before meals: 150-200=+2, 201-250=+4; 251-300=+6; 301-350=+8, over 350=+10 units    insulin degludec (TRESIBA FLEXTOUCH U-200) 200 unit/mL (3 mL) insulin pen Inject 22 units once daily and titrate to 40  units    metFORMIN (GLUCOPHAGE) 1000 MG tablet Take 1 tablet (1,000 mg total) by mouth 2 (two) times daily with meals.          Hospital Medications               glucagon (human recombinant) injection 1 mg 1 mg, Intramuscular, As needed (PRN), Turn patient on their side, give IM, and NOTIFY MD IMMEDIATELY.<BR><BR>Feed the patient as soon as patient awakens and is able to swallow.    glucose chewable tablet 16 g 16 g, Oral, As needed (PRN), (16 grams = #  four 4gm glucose tablets)    glucose chewable tablet 24 g 24 g, Oral, As needed (PRN), (24 grams = # six 4gm glucose tablets)    insulin aspart U-100 pen 0-5 Units 0-5 Units, Subcutaneous, Before meals &amp; nightly PRN, **LOW CORRECTION DOSE**<BR>Blood Glucose<BR>mg/dL                  Pre-meal                2200<BR>151-200                0 unit                      0 unit<BR>201-250                2 units                    1 unit<BR>251-300                3 units                    1 unit<BR>301-350                4 units                    2 units<BR>&gt;350                     5 units                    3 units<BR>Administer subcutaneously if needed at times designated by monitoring schedule. <BR>DO NOT HOLD correction dose insulin for patients who are  NPO.<BR>&quot;HIGH ALERT MEDICATION&quot; - Administer with meals or TF/TPN.    insulin detemir U-100 (Levemir) pen 6 Units 6 Units, Subcutaneous, Daily, If Blood Glucose is less than 100 mg/dL at BEDTIME, give 16 grams (four glucose tablets) X 1 dose for patients who can take PO. Recheck BG in 30 minutes and call MD for insulin dose adjustments prior to administering insulin detemir (Levemir).<BR>&quot;HIGH ALERT MEDICATION&quot;           Nutrition Education with handouts:   Carbohydrate Controlled Diet  Diabetic Meal Planning  Diabetes and Diet    Comments: Pt educated on carbohydrate controlled diet. Pt has been previously educated on diet.     Barriers to Learning: Pt not willing to make changes    Please  consult as needed.  Thank you!     Samantha Ordonez, Registration Eligible, Provisional LDN

## 2023-04-10 NOTE — ASSESSMENT & PLAN NOTE
Results for orders placed during the hospital encounter of 06/19/22    Echo Saline Bubble? No    Interpretation Summary  · The left ventricle is normal in size with mild concentric hypertrophy and hyperdynamic systolic function.  · The estimated ejection fraction is 75%.  · Normal right ventricular size with normal right ventricular systolic function.  · There is mild aortic valve stenosis.  · Aortic valve area is 1.59 cm2; peak velocity is 2.71 m/s; mean gradient is 19 mmHg.  · There is moderate mitral stenosis.  · The mean diastolic gradient across the mitral valve is 8 mmHg at a heart rate of 90 bpm.  · The estimated PA systolic pressure is 58 mmHg.  · There is pulmonary hypertension.    BNP  No results for input(s): BNP, BNPTRIAGEBLO in the last 168 hours.    No acute issues

## 2023-04-10 NOTE — ASSESSMENT & PLAN NOTE
Patient presented with left-sided facial droop, right-sided weakness and numbness for more than 24 hours prior to presentation  Symptoms are highly suggestive of stroke, despite negative imaging  Will obtain a brain MRI and consult Neurology

## 2023-04-10 NOTE — PT/OT/SLP EVAL
"Physical Therapy Evaluation    Patient Name:  Lorena Contreras   MRN:  [id number]    Recommendations:     Discharge Recommendations: home health PT   Discharge Equipment Recommendations: none       Assessment:     Lorena Contreras is a 72 y.o. female admitted with a medical diagnosis of DKA, type 2.  She presents with the following impairments/functional limitations: impaired endurance, impaired functional mobility, other (comment) (seizure-like episodes).    Rehab Prognosis: Good; patient would benefit from acute skilled PT services to address these deficits and reach maximum level of function.    Recent Surgery: * No surgery found *      Plan:     During this hospitalization, patient to be seen 5 x/week to address the identified rehab impairments via gait training, therapeutic activities, therapeutic exercises and progress toward the following goals:    Plan of Care Expires:  04/17/23    Subjective     Chief Complaint: "I need to rest"  Patient/Family Comments/goals: Pt agreeable to therapy evaluations.  Pain/Comfort:  Pain Rating 1: 0/10    Patients cultural, spiritual, Yazidism conflicts given the current situation: no    Living Environment:  PTA pt reports living with her grandson who lives in the front of the house and pt lives in the back of the house with 3 steps to enter, (R) HR.  Prior to admission, patients level of function was mod I.  Equipment used at home: walker, rolling, bedside commode.  DME owned (not currently used): transfer tub bench and wheelchair.  Upon discharge, patient will have assistance from family.    Objective:     Communicated with nurseOksana prior to session.  Patient found supine with blood pressure cuff, peripheral IV, pulse ox (continuous), telemetry  upon PT entry to room.    General Precautions: Standard, fall  Orthopedic Precautions:N/A   Braces: N/A  Respiratory Status: Room air    Exams:  Cognitive Exam:  Patient is oriented to Person, Place, and " Situation  Sensation:    -       Intact  light/touch BLEs  RLE ROM: WFL  RLE Strength: WFL  LLE ROM: WFL  LLE Strength: WFL    Functional Mobility:  Bed Mobility:     Supine to Sit: stand by assistance  Sit to Supine: stand by assistance  Transfers:     Sit to Stand:  contact guard assistance with no AD      AM-PAC 6 CLICK MOBILITY  Total Score:20       Treatment & Education:  Educated on role of PT and POC, stood EOB and able to sidestep with CGA.  Pt incontinent of urine with standing, returned to bed and assisted with changing draw sheet and pad.  Pt with 1 episode of seizure-like activity after sitting EOB that lasted approximately 30 secs.  Pt reports she is able to tell when it's coming on.  Pt somewhat lethargic upon arrival but able to tolerate evaluation well.    Patient left supine with all lines intact, call button in reach, and nurse notified.    GOALS:   Multidisciplinary Problems       Physical Therapy Goals          Problem: Physical Therapy    Goal Priority Disciplines Outcome Goal Variances Interventions   Physical Therapy Goal     PT, PT/OT Ongoing, Progressing     Description: Goals to be met by: 23     Patient will increase functional independence with mobility by performin. Pt to be mod I with bed mobility.  2. Pt to transfer with supervision.  3. Pt to ambulate 150' w/RW SBA.  4. Pt to be (I) with written HEP.                         History:     Past Medical History:   Diagnosis Date    Allergy     Altered mental status 2022    DYSARTHRIA, SPASTIC MOVEMENTS & DIFFICULTY SWALLOWING    Anemia     Anxiety     Arthritis     Cataract     both removed    Colon polyps     Coronary artery disease     Depression     Diabetes mellitus, type II     Disorder of kidney and ureter     Fibromyalgia     Follicular lymphoma     GERD (gastroesophageal reflux disease)     HTN (hypertension)     Hyperlipidemia     MI (myocardial infarction) 2019    Personal history of colonic polyps      Restless leg syndrome     Stroke        Past Surgical History:   Procedure Laterality Date    COLONOSCOPY  11/07/2012    Colon polyp found; repeat in 5 years    ELBOW SURGERY Right 2015    dislocation repair     ESOPHAGOGASTRODUODENOSCOPY  11/07/2012    atrophic gastritis, H pylori testing negative    INCISION AND DRAINAGE FOOT Right 6/2/2021    Procedure: INCISION AND DRAINAGE, FOOT, bone biopsy;  Surgeon: Quiana Penn DPM;  Location: [facility] OR;  Service: Podiatry;  Laterality: Right;    KNEE SURGERY Bilateral 2015    scoped    LEFT HEART CATHETERIZATION Left 3/29/2019    Procedure: Left heart cath;  Surgeon: Bladimir Barbosa MD;  Location: [facility] CATH LAB;  Service: Cardiology;  Laterality: Left;    LEFT HEART CATHETERIZATION Left 11/18/2019    Procedure: Left heart cath;  Surgeon: Karl Rico MD;  Location: [facility] CATH LAB;  Service: Cardiology;  Laterality: Left;    LEFT HEART CATHETERIZATION Left 1/8/2020    Procedure: Left heart cath, right radial, noon start;  Surgeon: Christos Monreal MD;  Location: [facility] CATH LAB;  Service: Cardiology;  Laterality: Left;  RN Pre Op 1-6-20.  To be admitted 1-7-20 sor Aspirin Disensitation    TONSILLECTOMY  1955    ULTRASOUND GUIDANCE  1/8/2020    Procedure: Ultrasound Guidance;  Surgeon: Christos Monreal MD;  Location: [facility] CATH LAB;  Service: Cardiology;;       Time Tracking:     PT Received On: 04/10/23  PT Start Time: 1321     PT Stop Time: 1335  PT Total Time (min): 14 min     Billable Minutes: Evaluation 14      04/10/2023

## 2023-04-10 NOTE — ASSESSMENT & PLAN NOTE
Presented with decreased p.o. intake  Last were suggestive of DKA (pH:  7.30, HC03: 23.0, A, B)   Admitted for further management    And:   Initiate and maintain DKA pathway  Monitor BMPs

## 2023-04-10 NOTE — ED PROVIDER NOTES
PMHx of CAD s/p stenting (2019), HFpEF,  HTN, IDT2DM c/b diabetic retinopathy, HLD, SANTHOSH, GERD, MDD, anxiety, TIA, NHL who presents with seizure-like activity and found to be in DKA (pH:  7.30, HC03: 16.0, A, BG: >500) and has UTI.     Patient is out of DKA, no further ICU needs, will step-down.    Adjust subcutaneous insulin  Follow-up neuro recs  Did have nitrite positive UTI, unclear if contributing to AMS/seizure-like activity  Consider 5-7 day course, given complicated/?seizure  F/u PCP, Endo, and ?neuro  SLP, on puree advance as tolerated  PT/OT ordered for dispo          Rios Jerry MD  Internal Medicine Staff

## 2023-04-10 NOTE — PLAN OF CARE
Problem: Physical Therapy  Goal: Physical Therapy Goal  Description: Goals to be met by: 23     Patient will increase functional independence with mobility by performin. Pt to be mod I with bed mobility.  2. Pt to transfer with supervision.  3. Pt to ambulate 150' w/RW SBA.  4. Pt to be (I) with written HEP.    Outcome: Ongoing, Progressing   Initial eval completed, see in chart for details.

## 2023-04-10 NOTE — PT/OT/SLP EVAL
Speech Language Pathology Evaluation  Cognitive/Bedside Swallow    Patient Name:  Lorena Contreras   MRN:  [id number]  Admitting Diagnosis: DKA, type 2    Recommendations:                  General Recommendations:  Dysphagia therapy and Speech/language therapy  Diet recommendations:   ,     Aspiration Precautions: 1 bite/sip at a time, Alternating bites/sips, Frequent oral care, HOB to 90 degrees, Meds whole buried in puree, and Small bites/sips   General Precautions: Standard, pureed diet  Communication strategies:   Speak slowly, open mouth wide when speaking    History:     Past Medical History:   Diagnosis Date    Allergy     Altered mental status 06/19/2022    DYSARTHRIA, SPASTIC MOVEMENTS & DIFFICULTY SWALLOWING    Anemia     Anxiety     Arthritis     Cataract     both removed    Colon polyps     Coronary artery disease     Depression     Diabetes mellitus, type II     Disorder of kidney and ureter     Fibromyalgia     Follicular lymphoma     GERD (gastroesophageal reflux disease)     HTN (hypertension)     Hyperlipidemia     MI (myocardial infarction) 03/2019    Personal history of colonic polyps     Restless leg syndrome     Stroke        Past Surgical History:   Procedure Laterality Date    COLONOSCOPY  11/07/2012    Colon polyp found; repeat in 5 years    ELBOW SURGERY Right 2015    dislocation repair     ESOPHAGOGASTRODUODENOSCOPY  11/07/2012    atrophic gastritis, H pylori testing negative    INCISION AND DRAINAGE FOOT Right 6/2/2021    Procedure: INCISION AND DRAINAGE, FOOT, bone biopsy;  Surgeon: Quiana Penn DPM;  Location: [facility] OR;  Service: Podiatry;  Laterality: Right;    KNEE SURGERY Bilateral 2015    scoped    LEFT HEART CATHETERIZATION Left 3/29/2019    Procedure: Left heart cath;  Surgeon: Bladimir Barbosa MD;  Location: [facility] CATH LAB;  Service: Cardiology;  Laterality: Left;    LEFT HEART CATHETERIZATION Left 11/18/2019    Procedure: Left heart cath;  Surgeon: Karl Rico MD;   "Location: [facility] CATH LAB;  Service: Cardiology;  Laterality: Left;    LEFT HEART CATHETERIZATION Left 1/8/2020    Procedure: Left heart cath, right radial, noon start;  Surgeon: Christos Monreal MD;  Location: [facility] CATH LAB;  Service: Cardiology;  Laterality: Left;  RN Pre Op 1-6-20.  To be admitted 1-7-20 sor Aspirin Disensitation    TONSILLECTOMY  1955    ULTRASOUND GUIDANCE  1/8/2020    Procedure: Ultrasound Guidance;  Surgeon: Christos Monreal MD;  Location: [facility] CATH LAB;  Service: Cardiology;;       Social History: Patient lives at home alone.    Modified Barium Swallow: none on file    Chest X-Rays: 4/9/23: FINDINGS:  The lungs are clear, with normal appearance of pulmonary vasculature and no pleural effusion or pneumothorax.     The cardiac silhouette is normal in size. The hilar and mediastinal contours are unremarkable.       Prior diet: unrestricted.    Subjective   Pt awake upon SLP arrival. FRANCIS Gandhi cleared ST to complete swallow eval at bedside.  Pt's daughter Brooke arrived skilled nursing through swallow eval stating pt had an episode of " facial spasms" about 8 months ago. Pt and daughter reported a one wk hx of swallowing difficulty since right facial numbness and asymmetry began. Last Wednesday the facial spasm  began consistently with accompanied facial asymmetry and dysarthria. Pt believes facial spasms are triggered by exposed dental nerve on right side of oral cavity. Pt's daughter denied pt's theory stating she worked as a dental assistant for 30 yrs and an exposed dental nerve could not cause facial spasms  and pt would not be able to tolerate pain associated with an exposed nerve in her tooth.    Patient goals: To improve speech and mastication.     Pain/Comfort:  Pain Rating 1: 0/10    Respiratory Status: Room air    Objective:   Pt's expressive and receptive language skills, and cognitive-communication skills are all WFL and at baseline level of functioning.     Motor Speech:  Moderate Dysarthria " for conversational level speech present. 70% intelligible for unknown contexts; 80% intelligible for known contexts      Oral Musculature Evaluation  Oral Musculature: facial asymmetry present (right facial asymmetry)  Dentition: present and adequate  Secretion Management: adequate  Mucosal Quality: good  Oral Labial Strength and Mobility: impaired coordination  Voice Prior to PO Intake: clear quality; adequate volume    Bedside Swallow Eval:   Consistencies Assessed: self fed  Thin liquids via straw x 4 oz  Puree x multiple trials  Soft solids x5 trials    Oral Phase:   Prolonged mastication  Pocketing   Right oral cavity  Oral residue  Slow oral transit time    Pharyngeal Phase:   no overt clinical signs/symptoms of aspiration    Compensatory Strategies  None    Pt observed to exhibit two episodes of facial spasms during ST session. Spasms appeared seizure-like in nature.  Both episodes lasted approximately 45 seconds each. Pt was eating during the first episode requiring the ST to manually clear food contents (sausage) using a spoon from pt' s oral activity to avoid choking. Once the spasm was finished, pt was able to resume eating without difficulty. The second episode occurred while pt was speaking with both daughter and SLP. ST recommended pt's diet to be downgraded to puree 2/2 prolonged mastication required for soft solids and pocketing. Pt and daughter were in agreement.     Treatment: Rec: Pt begin PO diet of pureed consistency with thin liquids.    Please note silent aspiration cannot be ruled out at bedside.    Education: Patient and family educated on aspiration precautions.( 1 bite/sip at a time, Alternating bites/sips, Frequent oral care, HOB to 90 degrees, Meds whole buried in puree, and Small bites/sips)Pt and daughter v/u of education provided.    Handout attached to pt's chart re: diet recs    FRANCIS Gandhi  and  notified regarding diet recs. White board update in patient's room regarding diet  recs.      Assessment:     Lorena Contreras is a 72 y.o. female with a dx of DKA, type 2. Pt presents with moderate oral dysphagia and dysarthria.ST will follow pt per POC.    Goals:   Multidisciplinary Problems       SLP Goals          Problem: SLP    Goal Priority Disciplines Outcome   SLP Goal     SLP Ongoing, Progressing   Description: ST. Pt will tolerate PO diet of puree consistency with thin liquids without overt s/s of aspiration.  2. Pt will perform OMEs to improve right facial droop independently.  3. Pt will IND utilize speech intelligibility strategies at level of conversation.                                              Plan:     Patient to be seen:  3 x/week   Plan of Care expires:  23  Plan of Care reviewed with:  patient, daughter   SLP Follow-Up:  Yes       Discharge recommendations:      Barriers to Discharge:  None    Time Tracking:     SLP Treatment Date:      Speech Start Time:  956  Speech Stop Time:       Speech Total Time (min):  26 min    Billable Minutes: Eval 12 min  and Eval Swallow and Oral Function 14 min    04/10/2023

## 2023-04-10 NOTE — PROGRESS NOTES
EEG reviewed up to 16:25 with one episode of facial twitching recorded with no clear changes on EEG noted through artifact.

## 2023-04-10 NOTE — H&P
[facility] Medicine  History & Physical    Patient Name: Lorena Contreras  MRN: [id number]  Patient Class: IP- Inpatient  Admission Date: 2023  Attending Physician: Rios Jerry MD   Primary Care Provider: Jorge Paris MD         Patient information was obtained from patient and ER records.     Subjective:     Principal Problem:DKA, type 2    Chief Complaint:   Chief Complaint   Patient presents with    Dysphagia     PT dysphagia x  2 days with possible seizure-like activity. CBG >500        HPI: This is a 71 year old female with a PMHx of CAD s/p stenting (2019), HFpEF,  HTN, IDT2DM c/b diabetic retinopathy, HLD, SANTHOSH, GERD, MDD, anxiety, TIA, NHL who presents with seizure-like activity.    The patient was in her usual state of health until about 4 days prior to presentation.  She reports initially developing tooth pain followed by decreased p.o. intake which progressed dysphagia and drooling.  A day prior to presentation, the patient had difficulty with speech and was having several episodes of generalized twitching, more pronounced in her face that lasts less than 30 seconds.  She also developed weakness more pronounced on her right side.  In the ED, the patient endorsed new symptoms of right-sided facial numbness, associated with numbness in her right hand and foot.    In the ED, the patient was tachypneic.  Last were suggestive of DKA (pH:  7.30, HC03: 23.0, A, B), leukocytosis (12.7), elevated creatinine (2.2 from a baseline of 1.1), elevated troponin (0.028).   Chest x-ray showed no acute abnormality. CT head showed no acute process.  She was given 1 L of NS, 2.1 L of LR, ceftriaxone, Keppra 1.5 g IV, and was started on insulin drip.  The patient was admitted for further management.      Past Medical History:   Diagnosis Date    Allergy     Altered mental status 2022    DYSARTHRIA, SPASTIC MOVEMENTS & DIFFICULTY SWALLOWING    Anemia     Anxiety      Arthritis     Cataract     both removed    Colon polyps     Coronary artery disease     Depression     Diabetes mellitus, type II     Disorder of kidney and ureter     Fibromyalgia     Follicular lymphoma     GERD (gastroesophageal reflux disease)     HTN (hypertension)     Hyperlipidemia     MI (myocardial infarction) 03/2019    Personal history of colonic polyps     Restless leg syndrome     Stroke        Past Surgical History:   Procedure Laterality Date    COLONOSCOPY  11/07/2012    Colon polyp found; repeat in 5 years    ELBOW SURGERY Right 2015    dislocation repair     ESOPHAGOGASTRODUODENOSCOPY  11/07/2012    atrophic gastritis, H pylori testing negative    INCISION AND DRAINAGE FOOT Right 6/2/2021    Procedure: INCISION AND DRAINAGE, FOOT, bone biopsy;  Surgeon: Quiana Penn DPM;  Location: [facility] OR;  Service: Podiatry;  Laterality: Right;    KNEE SURGERY Bilateral 2015    scoped    LEFT HEART CATHETERIZATION Left 3/29/2019    Procedure: Left heart cath;  Surgeon: Bladimir Barbosa MD;  Location: [facility] CATH LAB;  Service: Cardiology;  Laterality: Left;    LEFT HEART CATHETERIZATION Left 11/18/2019    Procedure: Left heart cath;  Surgeon: Karl Rico MD;  Location: [facility] CATH LAB;  Service: Cardiology;  Laterality: Left;    LEFT HEART CATHETERIZATION Left 1/8/2020    Procedure: Left heart cath, right radial, noon start;  Surgeon: Christos Monreal MD;  Location: [facility] CATH LAB;  Service: Cardiology;  Laterality: Left;  RN Pre Op 1-6-20.  To be admitted 1-7-20 sor Aspirin Disensitation    TONSILLECTOMY  1955    ULTRASOUND GUIDANCE  1/8/2020    Procedure: Ultrasound Guidance;  Surgeon: Christos Monreal MD;  Location: [facility] CATH LAB;  Service: Cardiology;;       Review of patient's allergies indicates:   Allergen Reactions    Novolin 70/30 (semi-synthetic) Nausea And Vomiting     Severe vomiting on Relion 70/30    Shellfish containing products Swelling    Sulfa (sulfonamide  antibiotics) Anaphylaxis    Talwin [pentazocine lactate] Anaphylaxis    Victoza [liraglutide] Nausea And Vomiting    Glipizide Nausea Only    Citric acid     Codeine     Influenza virus vaccines Hives    Iodine and iodide containing products Hives    Rituxan [rituximab] Hives    Zoloft [sertraline] Nausea And Vomiting       No current facility-administered medications on file prior to encounter.     Current Outpatient Medications on File Prior to Encounter   Medication Sig    amLODIPine (NORVASC) 10 MG tablet TAKE 1 TABLET EVERY DAY    aspirin 81 MG Chew Take 1 tablet (81 mg total) by mouth once daily.    atenoloL (TENORMIN) 50 MG tablet Take 1 tablet (50 mg total) by mouth 2 (two) times daily.    atorvastatin (LIPITOR) 80 MG tablet Take 1 tablet (80 mg total) by mouth every evening.    FLUoxetine 40 MG capsule Take 1 capsule (40 mg total) by mouth once daily. (Patient taking differently: Take 80 mg by mouth once daily.)    gabapentin (NEURONTIN) 300 MG capsule Take 2 capsules by mouth twice daily    isosorbide mononitrate (IMDUR) 30 MG 24 hr tablet Take 1 tablet (30 mg total) by mouth once daily.    pantoprazole (PROTONIX) 40 MG tablet Take 1 tablet by mouth once daily    ticagrelor (BRILINTA) 90 mg tablet Take 1 tablet (90 mg total) by mouth 2 (two) times daily.    acetaminophen (TYLENOL) 500 MG tablet Take 1 tablet (500 mg total) by mouth every 4 (four) hours as needed for Pain. (Patient taking differently: Take 1,000 mg by mouth every 4 (four) hours as needed for Pain.)    albuterol (PROVENTIL/VENTOLIN HFA) 90 mcg/actuation inhaler Inhale 2 puffs into the lungs every 6 (six) hours as needed for Wheezing or Shortness of Breath.    ASCORBIC ACID, VITAMIN C, ORAL Take by mouth once daily.    Bacillus coagulans (DIGESTIVE ADVANTAGE IMMUNE ORAL) Take by mouth.    blood sugar diagnostic (FREESTYLE TEST) Strp 1 strip by Misc.(Non-Drug; Combo Route) route 4 (four) times daily.    cholecalciferol,  vitamin D3, (VITAMIN D3) 50 mcg (2,000 unit) Cap Take 2 capsules by mouth 2 (two) times daily.    cyanocobalamin (VITAMIN B-12) 1000 MCG tablet Take 100 mcg by mouth once daily.    diclofenac sodium (VOLTAREN TOP) Apply topically.    dulaglutide (TRULICITY) 4.5 mg/0.5 mL pen injector Inject 4.5 mg into the skin every 7 days.    EPINEPHrine (EPIPEN) 0.3 mg/0.3 mL AtIn Inject 0.3 mLs (0.3 mg total) into the muscle once. for 1 dose    ESOMEPRAZOLE MAGNESIUM ORAL Take by mouth as needed.    ferrous sulfate 325 (65 FE) MG EC tablet Take 1 tablet (325 mg total) by mouth once daily.    flavoring agent, bulk, (CLOVE FLAVORING) Oil 1 Dose by Misc.(Non-Drug; Combo Route) route every 4 (four) hours as needed (dental pain).    fluticasone propionate (FLONASE) 50 mcg/actuation nasal spray 2 sprays (100 mcg total) by Each Nostril route once daily.    glimepiride (AMARYL) 2 MG tablet Take 1 tablet (2 mg total) by mouth before breakfast.    hydroCHLOROthiazide (HYDRODIURIL) 25 MG tablet Take 1 tablet (25 mg total) by mouth once daily.    insulin aspart U-100 (NOVOLOG FLEXPEN U-100 INSULIN) 100 unit/mL (3 mL) InPn pen Use with sliding scale before meals: 150-200=+2, 201-250=+4; 251-300=+6; 301-350=+8, over 350=+10 units    insulin degludec (TRESIBA FLEXTOUCH U-200) 200 unit/mL (3 mL) insulin pen Inject 22 units once daily and titrate to 40 units    lancets 32 gauge Misc 1 lancet by Misc.(Non-Drug; Combo Route) route 4 (four) times daily.    loratadine (CLARITIN) 10 mg tablet Take 1 tablet (10 mg total) by mouth every morning.    magnesium oxide (MAG-OX) 400 mg tablet Take 400 mg by mouth once daily.    meclizine (ANTIVERT) 25 mg tablet Take 1 tablet (25 mg total) by mouth 3 (three) times daily as needed for Dizziness.    melatonin 10 mg Cap Take 10 mg by mouth nightly.    metFORMIN (GLUCOPHAGE) 1000 MG tablet Take 1 tablet (1,000 mg total) by mouth 2 (two) times daily with meals.    multivitamin/iron/folic acid  "(CENTRUM COMPLETE ORAL) Take by mouth.    nitroGLYCERIN (NITROSTAT) 0.4 MG SL tablet PLACE 1 TABLET UNDER THE TONGUE EVERY FIVE MINUTES AS NEEDED FOR CHEST PAIN AS DIRECTED    pen needle, diabetic (BD ULTRA-FINE SAGAR PEN NEEDLE) 32 gauge x 5/32" Ndle USE WITH NOVOLOG MIX FLEXPENS    TRUEPLUS LANCETS 30 gauge Misc     valsartan (DIOVAN) 160 MG tablet Take 1 tablet (160 mg total) by mouth once daily.    vitamin E 1000 UNIT capsule Take 1,000 Units by mouth once daily.     Family History       Problem Relation (Age of Onset)    Allergy (severe) Daughter    Cancer Mother, Father    Diabetes Sister    Heart disease Mother    Hypertension Sister    Lung cancer Brother    No Known Problems Daughter          Tobacco Use    Smoking status: Never    Smokeless tobacco: Never   Substance and Sexual Activity    Alcohol use: Not Currently    Drug use: Never    Sexual activity: Not Currently     Partners: Male     Review of Systems   Constitutional:  Positive for activity change and appetite change.   HENT:  Positive for dental problem and drooling.    Eyes: Negative.    Respiratory: Negative.     Cardiovascular: Negative.    Gastrointestinal: Negative.    Endocrine: Negative.    Genitourinary: Negative.    Musculoskeletal: Negative.    Skin: Negative.    Allergic/Immunologic: Negative.    Neurological:  Positive for facial asymmetry, speech difficulty and weakness.   Psychiatric/Behavioral: Negative.     Objective:     Vital Signs (Most Recent):  Temp: 98 °F (36.7 °C) (04/09/23 2332)  Pulse: 78 (04/10/23 0105)  Resp: (!) 28 (04/10/23 0105)  BP: (!) 124/56 (04/10/23 0105)  SpO2: 100 % (04/10/23 0105)   Vital Signs (24h Range):  Temp:  [98 °F (36.7 °C)] 98 °F (36.7 °C)  Pulse:  [78-90] 78  Resp:  [20-30] 28  SpO2:  [96 %-100 %] 100 %  BP: (123-138)/(56-59) 124/56     Weight: 73 kg (160 lb 15 oz)  Body mass index is 25.21 kg/m².    Physical Exam  Vitals and nursing note reviewed.   Constitutional:       General: She is not " in acute distress.     Appearance: Normal appearance. She is not ill-appearing.   HENT:      Head: Normocephalic and atraumatic.      Nose: Nose normal.      Mouth/Throat:      Mouth: Mucous membranes are moist.   Eyes:      Extraocular Movements: Extraocular movements intact.   Cardiovascular:      Rate and Rhythm: Normal rate.      Pulses: Normal pulses.      Heart sounds: No murmur heard.  Pulmonary:      Effort: Pulmonary effort is normal. No respiratory distress.   Abdominal:      General: Abdomen is flat.      Palpations: Abdomen is soft.      Tenderness: There is no abdominal tenderness.   Musculoskeletal:      Right lower leg: No edema.      Left lower leg: No edema.   Skin:     General: Skin is warm.      Capillary Refill: Capillary refill takes less than 2 seconds.   Neurological:      Mental Status: She is alert.      Comments: Left sided facial droop, dysarthria   Right upper extremity 4/5 strength    Psychiatric:         Mood and Affect: Mood normal.           Significant Labs: All pertinent labs within the past 24 hours have been reviewed.    Significant Imaging: I have reviewed all pertinent imaging results/findings within the past 24 hours.    Assessment/Plan:     * DKA, type 2  Presented with decreased p.o. intake  Last were suggestive of DKA (pH:  7.30, HC03: 23.0, A, B)   Admitted for further management    And:   Initiate and maintain DKA pathway  Monitor BMPs    Leukocytosis  Likely reactive. Not having infectious symptoms. Monitor. Hold antibiotics for now.       Stroke-like symptoms  Patient presented with left-sided facial droop, right-sided weakness and numbness for more than 24 hours prior to presentation  Symptoms are highly suggestive of stroke, despite negative imaging  Will obtain a brain MRI and consult Neurology        Seizure-like activity  Presented with generalized twitching, more pronounced in her face  The patient does not lose consciousness during her episodes and  remember them well, and episodes typically last less than 30 seconds  Episodes are followed by aphasia for a few seconds.  Could be related to an underlying seizure disorder, or complication from a recent stroke  Received 1.5 g Keppra in the ED without improvement of her symptoms  We will hold off on AEDs for now until Neurology evaluation  Obtain EEG, brain MRI      KYA (acute kidney injury)  Patient with acute kidney injury likely due to pre-renal azotemia KYA is currently stable. Labs reviewed- Renal function/electrolytes with Estimated Creatinine Clearance: 22.5 mL/min (A) (based on SCr of 2.2 mg/dL (H)). according to latest data. Monitor urine output and serial BMP and adjust therapy as needed. Avoid nephrotoxins and renally dose meds for GFR listed above.       Chronic diastolic heart failure  Results for orders placed during the hospital encounter of 06/19/22    Echo Saline Bubble? No    Interpretation Summary  · The left ventricle is normal in size with mild concentric hypertrophy and hyperdynamic systolic function.  · The estimated ejection fraction is 75%.  · Normal right ventricular size with normal right ventricular systolic function.  · There is mild aortic valve stenosis.  · Aortic valve area is 1.59 cm2; peak velocity is 2.71 m/s; mean gradient is 19 mmHg.  · There is moderate mitral stenosis.  · The mean diastolic gradient across the mitral valve is 8 mmHg at a heart rate of 90 bpm.  · The estimated PA systolic pressure is 58 mmHg.  · There is pulmonary hypertension.    BNP  No results for input(s): BNP, BNPTRIAGEBLO in the last 168 hours.    No acute issues       Coronary artery disease of native artery of native heart with stable angina pectoris  Resume home medications       Hyperlipidemia  Resume statin       Hypertension associated with diabetes  Monitor BP. Holding nephrotoxic home medications     VTE Risk Mitigation (From admission, onward)         Ordered     heparin (porcine) injection 5,000  Units  Every 8 hours         04/10/23 0217     IP VTE HIGH RISK PATIENT  Once         04/10/23 0217     Place BORIS hose  Until discontinued         04/10/23 0217     Place sequential compression device  Until discontinued         04/10/23 0217     Place sequential compression device  Until discontinued         04/10/23 0107              Critical care time spent on the evaluation and treatment of severe organ dysfunction, review of pertinent labs and imaging studies, discussions with consulting providers and discussions with patient/family: 45 minutes.       Munir Tovar MD  Department of Hospital Medicine  [facility] - Intensive Care

## 2023-04-10 NOTE — PT/OT/SLP PROGRESS
Occupational Therapy      Patient Name:  Lorena Contreras   MRN:  [id number]    Patient not seen today secondary to: echo at bedside. Will follow-up later.       4/10/2023

## 2023-04-10 NOTE — ASSESSMENT & PLAN NOTE
Presented with generalized twitching, more pronounced in her face  The patient does not lose consciousness during her episodes and remember them well, and episodes typically last less than 30 seconds  Episodes are followed by aphasia for a few seconds.  Could be related to an underlying seizure disorder, or complication from a recent stroke  Received 1.5 g Keppra in the ED without improvement of her symptoms  We will hold off on AEDs for now until Neurology evaluation  Obtain EEG, brain MRI

## 2023-04-10 NOTE — NURSING
[facility]  Nurses Note -- 4 Eyes      4/10/2023       Skin assessed on: Q Shift      [x] No Pressure Injuries Present    [x]Prevention Measures Documented    [] Yes LDA  for Pressure Injury Previously documented     [] Yes New Pressure Injury Discovered   [] LDA for New Pressure Injury Added      Attending RN:  Oksana Flood RN     Second RN:  ERWIN Gómez RN

## 2023-04-10 NOTE — HPI
This is a 71 year old female with a PMHx of CAD s/p stenting (2019), HFpEF,  HTN, IDT2DM c/b diabetic retinopathy, HLD, SANTHOSH, GERD, MDD, anxiety, TIA, NHL who presents with seizure-like activity.    The patient was in her usual state of health until about 4 days prior to presentation.  She reports initially developing tooth pain followed by decreased p.o. intake which progressed dysphagia and drooling.  A day prior to presentation, the patient had difficulty with speech and was having several episodes of generalized twitching, more pronounced in her face that lasts less than 30 seconds.  She also developed weakness more pronounced on her right side.  In the ED, the patient endorsed new symptoms of right-sided facial numbness, associated with numbness in her right hand and foot.    In the ED, the patient was tachypneic.  Last were suggestive of DKA (pH:  7.30, HC03: 16.0, A, BG: >500), leukocytosis (12.7), elevated creatinine (2.2 from a baseline of 1.1), elevated troponin (0.028).   Chest x-ray showed no acute abnormality. CT head showed no acute process.  She was given 1 L of NS, 2.1 L of LR, ceftriaxone, Keppra 1.5 g IV, and was started on insulin drip.  The patient was admitted for further management.

## 2023-04-10 NOTE — PROVIDER TRANSFER
PMHx of CAD s/p stenting (2019), HFpEF,  HTN, IDT2DM c/b diabetic retinopathy, HLD, SANTHOSH, GERD, MDD, anxiety, TIA, NHL who presents with seizure-like activity and found to be in DKA (pH:  7.30, HC03: 16.0, A, BG: >500) and has UTI.      Patient is out of DKA, no further ICU needs, will step-down.     Adjust subcutaneous insulin  F/u MRI (had to give versed 2/2 claustrophobia)  Follow-up neuro recs  Did have nitrite positive UTI, unclear if contributing to AMS/seizure-like activity  Consider 5-7 day course, given complicated/?seizure  F/u PCP, Endo, and ?neuro  SLP, on puree advance as tolerated  PT/OT ordered for dispo             Rios Jerry MD  Internal Medicine Staff

## 2023-04-10 NOTE — ACP (ADVANCE CARE PLANNING)
Advance Care Planning     Date: 04/10/2023    Code Status  In light of the patients advanced and life limiting illness,I engaged the the patient in a conversation about the patient's preferences for care  at the very end of life. The patient wishes to have a natural, peaceful death.  Along those lines, the patient does not wish to have CPR or other invasive treatments performed when her heart and/or breathing stops. I communicated to the patient that a DNR order would be placed in her medical record to reflect this preference.  I spent a total of 10 minutes engaging the patient in this advance care planning discussion. Patient also stated that her family knows of her wishes.

## 2023-04-10 NOTE — ASSESSMENT & PLAN NOTE
Patient with acute kidney injury likely due to pre-renal azotemia KYA is currently stable. Labs reviewed- Renal function/electrolytes with Estimated Creatinine Clearance: 22.5 mL/min (A) (based on SCr of 2.2 mg/dL (H)). according to latest data. Monitor urine output and serial BMP and adjust therapy as needed. Avoid nephrotoxins and renally dose meds for GFR listed above.

## 2023-04-10 NOTE — NURSING
Nurses Note -- 4 Eyes      4/10/2023   3:35 AM      Skin assessed during: Admit      [x] No Pressure Injuries Present    [x]Prevention Measures Documented      [] Yes- Altered Skin Integrity Present or Discovered   [] LDA Added if Not in Epic (Describe Wound)   [] New Altered Skin Integrity was Present on Admit and Documented in LDA   [] Wound Image Taken    Wound Care Consulted? No    Attending Nurse:  Eusebio Gómez RN     Second RN/Staff Member:  FRANCIS Duque

## 2023-04-10 NOTE — ED PROVIDER NOTES
Encounter Date: 4/9/2023       History     Chief Complaint   Patient presents with    Dysphagia     PT dysphagia x  2 days with possible seizure-like activity. CBG >500     72-year-old female history of hypertension diabetes mellitus presents with abnormal facial movements, difficulty speaking, dental pain.  Patient has presenting with the daughter.  The patient reports symptom onset yesterday around 4:00 p.m..  The patient states she began having abnormal facial movements in spasms.  Daughter reports patient has had abnormal speaking throughout the day.  Patient was last seen normal multiple days prior.  The daughter was able to show a video of the spasms.  The event also happened during triage and I was able to visualize the event.  Patient reports she is aware of her surroundings and what is occurring but is unable to speak when the spasms occur.    Review of patient's allergies indicates:   Allergen Reactions    Novolin 70/30 (semi-synthetic) Nausea And Vomiting     Severe vomiting on Relion 70/30    Shellfish containing products Swelling    Sulfa (sulfonamide antibiotics) Anaphylaxis    Talwin [pentazocine lactate] Anaphylaxis    Victoza [liraglutide] Nausea And Vomiting    Glipizide Nausea Only    Citric acid     Codeine     Influenza virus vaccines Hives    Iodine and iodide containing products Hives    Rituxan [rituximab] Hives    Zoloft [sertraline] Nausea And Vomiting     Past Medical History:   Diagnosis Date    Allergy     Altered mental status 06/19/2022    DYSARTHRIA, SPASTIC MOVEMENTS & DIFFICULTY SWALLOWING    Anemia     Anxiety     Arthritis     Cataract     both removed    Colon polyps     Coronary artery disease     Depression     Diabetes mellitus, type II     Disorder of kidney and ureter     Fibromyalgia     Follicular lymphoma     GERD (gastroesophageal reflux disease)     HTN (hypertension)     Hyperlipidemia     MI (myocardial infarction) 03/2019    Personal history of colonic polyps      Restless leg syndrome     Stroke      Past Surgical History:   Procedure Laterality Date    COLONOSCOPY  11/07/2012    Colon polyp found; repeat in 5 years    ELBOW SURGERY Right 2015    dislocation repair     ESOPHAGOGASTRODUODENOSCOPY  11/07/2012    atrophic gastritis, H pylori testing negative    INCISION AND DRAINAGE FOOT Right 6/2/2021    Procedure: INCISION AND DRAINAGE, FOOT, bone biopsy;  Surgeon: Quiana Penn DPM;  Location: [facility] OR;  Service: Podiatry;  Laterality: Right;    KNEE SURGERY Bilateral 2015    scoped    LEFT HEART CATHETERIZATION Left 3/29/2019    Procedure: Left heart cath;  Surgeon: Bladimir Barbosa MD;  Location: [facility] CATH LAB;  Service: Cardiology;  Laterality: Left;    LEFT HEART CATHETERIZATION Left 11/18/2019    Procedure: Left heart cath;  Surgeon: Karl Rico MD;  Location: [facility] CATH LAB;  Service: Cardiology;  Laterality: Left;    LEFT HEART CATHETERIZATION Left 1/8/2020    Procedure: Left heart cath, right radial, noon start;  Surgeon: Christos Monreal MD;  Location: [facility] CATH LAB;  Service: Cardiology;  Laterality: Left;  RN Pre Op 1-6-20.  To be admitted 1-7-20 sor Aspirin Disensitation    TONSILLECTOMY  1955    ULTRASOUND GUIDANCE  1/8/2020    Procedure: Ultrasound Guidance;  Surgeon: Christos Monreal MD;  Location: [facility] CATH LAB;  Service: Cardiology;;     Family History   Problem Relation Age of Onset    Cancer Mother         colon    Heart disease Mother     Cancer Father         lung    Lung cancer Brother     Diabetes Sister     Hypertension Sister     Allergy (severe) Daughter     No Known Problems Daughter     Stroke Neg Hx     Hyperlipidemia Neg Hx      Social History     Tobacco Use    Smoking status: Never    Smokeless tobacco: Never   Substance Use Topics    Alcohol use: Not Currently    Drug use: Never     Review of Systems   Constitutional:  Negative for chills and fever.   HENT:  Positive for dental problem. Negative for congestion, ear pain and sore throat.     Respiratory:  Negative for cough and shortness of breath.    Cardiovascular:  Negative for chest pain.   Gastrointestinal:  Negative for abdominal pain, nausea and vomiting.   Genitourinary:  Negative for dysuria and frequency.   Musculoskeletal:  Negative for back pain and neck pain.   Skin:  Negative for rash.   Neurological:  Positive for seizures, facial asymmetry, speech difficulty and numbness. Negative for dizziness, weakness, light-headedness and headaches.   Psychiatric/Behavioral:  Negative for confusion.      Physical Exam     Initial Vitals [04/09/23 2332]   BP Pulse Resp Temp SpO2   (!) 123/57 90 20 98 °F (36.7 °C) 96 %      MAP       --         Physical Exam    Nursing note and vitals reviewed.  Constitutional: Vital signs are normal. She appears well-developed and well-nourished. She does not appear ill. No distress.   HENT:   Head: Normocephalic and atraumatic.   Mouth/Throat: Oropharynx is clear and moist. No oropharyngeal exudate or posterior oropharyngeal edema.   No dental caries noted.  No gum abscess on palpation.   Eyes: Conjunctivae are normal. Pupils are equal, round, and reactive to light. Right eye exhibits nystagmus. Right eye exhibits normal extraocular motion. Left eye exhibits nystagmus. Left eye exhibits normal extraocular motion.   Pupils are 3 mm bilaterally.  Horizontal nystagmus to the left.   Neck:   Normal range of motion.  Cardiovascular:  Regular rhythm and normal heart sounds.   Tachycardia present.         No murmur heard.  Pulmonary/Chest: Breath sounds normal. No respiratory distress. She has no wheezes.   Abdominal: Abdomen is soft. There is no abdominal tenderness.   Musculoskeletal:         General: No edema.      Cervical back: Normal range of motion.     Neurological: She is alert and oriented to person, place, and time. She has normal strength. A cranial nerve deficit and sensory deficit is present. She displays a negative Romberg sign. GCS score is 15.   Partial  right lower facial palsy.  Unable to stick out tongue.  Reported decreased sensation of the right arm and right leg.   Skin: Skin is warm.   Psychiatric: She has a normal mood and affect.       ED Course   Critical Care    Date/Time: 4/10/2023 2:32 AM  Performed by: Mk Reich MD  Authorized by: Mk Reich MD   Direct patient critical care time: 10 minutes  Additional history critical care time: 5 (family) minutes  Ordering / reviewing critical care time: 10 minutes  Documentation critical care time: 10 minutes  Total critical care time (exclusive of procedural time) : 35 minutes  Critical care was necessary to treat or prevent imminent or life-threatening deterioration of the following conditions: metabolic crisis and CNS failure or compromise.  Critical care was time spent personally by me on the following activities: blood draw for specimens, development of treatment plan with patient or surrogate, evaluation of patient's response to treatment, examination of patient, obtaining history from patient or surrogate, ordering and performing treatments and interventions, ordering and review of laboratory studies, ordering and review of radiographic studies, pulse oximetry and re-evaluation of patient's condition.  Comments: Diabetic ketoacidosis requiring titratable insulin drip, concern for CVA.      Labs Reviewed   CBC W/ AUTO DIFFERENTIAL - Abnormal; Notable for the following components:       Result Value    WBC 12.73 (*)     Gran # (ANC) 8.7 (*)     Immature Grans (Abs) 0.05 (*)     All other components within normal limits   COMPREHENSIVE METABOLIC PANEL - Abnormal; Notable for the following components:    Sodium 132 (*)     CO2 16 (*)     Glucose 431 (*)     BUN 37 (*)     Creatinine 2.2 (*)     Alkaline Phosphatase 167 (*)     Anion Gap 17 (*)     eGFR 23 (*)     All other components within normal limits   LIPID PANEL - Abnormal; Notable for the following components:    Cholesterol 108 (*)     HDL  34 (*)     LDL Cholesterol 48.0 (*)     All other components within normal limits   TROPONIN I - Abnormal; Notable for the following components:    Troponin I 0.028 (*)     All other components within normal limits   POCT GLUCOSE - Abnormal; Notable for the following components:    POCT Glucose >500 (*)     All other components within normal limits   ISTAT PROCEDURE - Abnormal; Notable for the following components:    POC PH 7.302 (*)     POC PCO2 46.6 (*)     POC PO2 20 (*)     POC HCO3 23.0 (*)     POC SATURATED O2 28 (*)     All other components within normal limits   POCT GLUCOSE - Abnormal; Notable for the following components:    POCT Glucose 404 (*)     All other components within normal limits   ISTAT LACTATE - Abnormal; Notable for the following components:    POC Lactate 3.68 (*)     All other components within normal limits   POCT GLUCOSE - Abnormal; Notable for the following components:    POCT Glucose 289 (*)     All other components within normal limits   CULTURE, BLOOD   CULTURE, BLOOD   BETA - HYDROXYBUTYRATE, SERUM   PROTIME-INR   TSH   URINALYSIS, REFLEX TO URINE CULTURE   LACTIC ACID, PLASMA   POCT GLUCOSE MONITORING CONTINUOUS   POCT GLUCOSE MONITORING CONTINUOUS          Imaging Results              CT Head Without Contrast (Final result)  Result time 04/10/23 00:43:34      Final result by Leatha Palomo MD (04/10/23 00:43:34)                   Impression:      No acute intracranial abnormalities identified.      Electronically signed by: Leatha Palomo MD  Date:    04/10/2023  Time:    00:43               Narrative:    EXAMINATION:  CT HEAD WITHOUT CONTRAST    CLINICAL HISTORY:  Neuro deficit, acute, stroke suspected;possible CVA;    TECHNIQUE:  Low dose axial images were obtained through the head.  Coronal and sagittal reformations were also performed. Contrast was not administered.    COMPARISON:  CT head and MRI brain from June 2022.    FINDINGS:  There is mild generalized cerebral volume  loss.  No evidence of acute/recent major vascular distribution cerebral infarction, intraparenchymal hemorrhage, or intra-axial space occupying lesion. The ventricular system is stable in size and configuration with cavum septum pellucidum noted.  No effacement of the skull-base cisterns. No abnormal extra-axial fluid collections or blood products. Visualized paranasal sinuses and mastoid air cells are clear. The calvarium shows no significant abnormality.                                       X-Ray Chest AP Portable (Final result)  Result time 04/10/23 00:00:27      Final result by Javier Marc MD (04/10/23 00:00:27)                   Impression:      No acute abnormality.      Electronically signed by: Javier Marc  Date:    04/10/2023  Time:    00:00               Narrative:    EXAMINATION:  XR CHEST AP PORTABLE    CLINICAL HISTORY:  hyperglycemia;    TECHNIQUE:  Single frontal view of the chest was performed.    COMPARISON:  01/14/2023    FINDINGS:  The lungs are clear, with normal appearance of pulmonary vasculature and no pleural effusion or pneumothorax.    The cardiac silhouette is normal in size. The hilar and mediastinal contours are unremarkable.    Bones are intact.                                       Medications   sodium chloride 0.9% flush 10 mL (has no administration in time range)   dextrose 5 % and 0.45 % NaCl infusion (has no administration in time range)   dextrose 10 % infusion (has no administration in time range)   dextrose 10 % infusion (has no administration in time range)   dextrose 10% bolus 125 mL 125 mL (has no administration in time range)   dextrose 10% bolus 250 mL 250 mL (has no administration in time range)   insulin regular in 0.9 % NaCl 100 unit/100 mL (1 unit/mL) infusion (0.1 Units/kg/hr × 73 kg Intravenous Rate/Dose Change 4/10/23 5411)   aspirin chewable tablet 81 mg (has no administration in time range)   atenoloL tablet 50 mg (has no administration in time range)    atorvastatin tablet 80 mg (has no administration in time range)   FLUoxetine capsule 40 mg (has no administration in time range)   gabapentin capsule 600 mg (has no administration in time range)   pantoprazole EC tablet 40 mg (has no administration in time range)   ticagrelor tablet 90 mg (has no administration in time range)   sodium chloride 0.9% flush 10 mL (has no administration in time range)   0.9%  NaCl infusion (125 mL/hr Intravenous New Bag 4/10/23 0249)   dextrose 50% injection 25 g (has no administration in time range)   dextrose 50% injection 12.5 g (has no administration in time range)   ondansetron injection 4 mg (has no administration in time range)   acetaminophen tablet 650 mg (has no administration in time range)   heparin (porcine) injection 5,000 Units (has no administration in time range)   potassium chloride 10 mEq in 100 mL IVPB (has no administration in time range)     And   potassium chloride 10 mEq in 100 mL IVPB (has no administration in time range)     And   potassium chloride 10 mEq in 100 mL IVPB (has no administration in time range)   insulin regular bolus from bag/infusion 7.3 Units 7.3 mL (has no administration in time range)   melatonin tablet 6 mg (has no administration in time range)   LIDOcaine 5 % patch 1 patch (has no administration in time range)   aspirin suppository 300 mg (has no administration in time range)   acetaminophen suppository 650 mg (has no administration in time range)   sodium chloride 0.9% bolus 1,000 mL 1,000 mL (0 mLs Intravenous Stopped 4/10/23 0114)   levETIRAcetam injection 1,500 mg (1,500 mg Intravenous Given 4/10/23 0013)   lactated ringers bolus 2,190 mL (2,190 mLs Intravenous New Bag 4/10/23 0128)   cefTRIAXone 2 gram/50 mL IVPB 2 g (0 g Intravenous Stopped 4/10/23 0202)     Medical Decision Making:   Initial Assessment:   72-year-old female presenting with dysarthria, right facial weakness, right-sided sensory deficit, abnormal face  twitching.  Spasms appear to be focal seizures with abnormal facial twitching and twitching of the arm.  Patient is unable to speak during these events.  The witnessed event lasted roughly 30 seconds.  Patient was able to move the remainder of her extremities.  Patient was able to nod during the episode.  On exam patient has right lower facial weakness.  Reported decreased sensation of the right arm and right leg.  No drift present.  Concern for right-sided CVA.  Patient hyperglycemic with glucose of 431, CO2 16, anion gap 17 pH, 7.3.  Patient is started on insulin drip for diabetic ketoacidosis.    Patient prior to empiric antibiotics however no source of infection at this time.  Patient provided antibiotics for possible undiagnosed infection.  Differential Diagnosis:   CVA, seizures, DKA, ICH, brain mass  Clinical Tests:   Lab Tests: Ordered and Reviewed  Radiological Study: Ordered and Reviewed  Medical Tests: Ordered and Reviewed  ED Management:    Problems considered includes dysarthria, facial weakness, abnormal twitching (Signs & symptoms, differentials)  Chronic problems impacting care includes diabetes mellitus, previous TIA.  Please see workup section for emergency physician independent ECG interpretation.  Imaging independently reviewed.  Agree with radiologist's interpretation. Imaging includes no ICH or mass noted on head imaging  All labs reviewed and considered in the patient's differential diagnosis. Discussion of labs includes concern for DKA.  Additional history obtained from independent historians. Details includes patient's daughter as patient unable to provide full history of what has occurred.    Decision regarding hospitalization.  The patient requires additional care in the hospital overnight.   Dr. Tovar with hospital medicine will accept the patient. Labs, imaging, or procedures were discussed.    Plan was discussed with the patient.  This includes plan for admission, labs imaging results,  concerns for CVA, concern for DKA.    All questions or concerns have been addressed.    This patient does not have evidence of infective focus  Antibiotics given- Antibiotics (72h ago, onward)    Start     Stop Route Frequency Ordered    04/10/23 0045  cefTRIAXone 2 gram/50 mL IVPB 2 g  (Sepsis Workup)         -- IV Once 04/10/23 0034        Fluid challenge Actual Body weight- Patient will receive 30ml/kg actual body weight to calculate fluid bolus for treatment of septic shock.       Additional MDM:     NIH Stroke Scale:   Interval = baseline (upon arrival/admit)  Level of consciousness = 0 - alert  LOC questions = 0 - answers both correctly  LOC commands = 0 - performs both correctly  Best gaze = 0 - normal  Visual = 0 - no visual loss  Facial palsy = 1 - minor  Motor left arm =  0 - no drift  Motor right arm =  0 - no drift  Motor left leg = 0 - no drift  Motor right leg =  0 - no drift  Limb ataxia = 0 - absent  Sensory = 1 - mild to moderate loss  Best language = 0 - no aphasia  Dysarthria = 1 - mild to moderate dysarthria  Extinction and inattention = 0 - no neglect  NIH Stroke Scale Total = 3        ED Course as of 04/10/23 0309   Mon Apr 10, 2023   0034 Beta-Hydroxybutyrate: 0.2 [JM]   0034 POCT Glucose(!): 404 [JM]   0106 CO2(!): 16 [JM]      ED Course User Index  [JM] Mk Reich MD                 Clinical Impression:   Final diagnoses:  [R73.9] Hyperglycemia  [R29.810] Weakness on right side of face (Primary)  [R47.1] Dysarthria  [R20.8] Decreased sensation of lower extremity - right  [R20.8] Decreased sensation of hand and upper extremity - right  [E11.10] Diabetic ketoacidosis without coma associated with type 2 diabetes mellitus  [N17.9] Acute renal failure, unspecified acute renal failure type  [R56.9] Seizure-like activity        ED Disposition Condition    Admit Stable                Mk Reich MD  04/10/23 4139

## 2023-04-10 NOTE — PT/OT/SLP EVAL
"Occupational Therapy   Evaluation    Name: Lorena Contreras  MRN: [id number]  Admitting Diagnosis: DKA, type 2  Recent Surgery: * No surgery found *      Recommendations:     Discharge Recommendations: home health OT (with caregiver supervision/assistance)  Discharge Equipment Recommendations:  none  Barriers to discharge:  None    Assessment:     Lorena Contreras is a 72 y.o. female with a medical diagnosis of DKA, type 2.  Performance deficits affecting function: weakness, impaired balance, impaired endurance, impaired self care skills, impaired functional mobility, decreased coordination, decreased safety awareness.      Rehab Prognosis: Good; patient would benefit from acute skilled OT services to address these deficits and reach maximum level of function.       Plan:     Patient to be seen 5 x/week to address the above listed problems via self-care/home management, therapeutic activities, therapeutic exercises  Plan of Care Expires: 04/24/23  Plan of Care Reviewed with: patient    Subjective     Chief Complaint: urinates when she stands; reported feeling sleepy upon arrival, but increased alertness with active participation  Patient/Family Comments/goals: reports that she can feel when these "episodes" come. One seizure-like episode (face twitching) occurred while seated EOB for ~30 sec then pt able to acknowledge that it passed     Occupational Profile:  Living Environment: pt lives with her grandson in a [facility] with ramp at entry. Bathroom set-up: tub/shower combo with bath bench.  Previous level of function: MOD I with ADLs, using BSC and TTB. MOD I with mobility using RW.   Roles and Routines: does not drive   Equipment Used at Home: walker, rolling, bedside commode, wheelchair, bath bench  Assistance upon Discharge: grandson; 2 adult children (1 local, 1 in [place])     Pain/Comfort:  Pain Rating 1: 0/10    Patients cultural, spiritual, Yazdanism conflicts given the current situation: no    Objective: "     Communicated with: nurse, Shante, and Dr. Jerry prior to session.  Patient found HOB elevated with blood pressure cuff, pulse ox (continuous), telemetry, peripheral IV (portable ICU machine in place and purewick disconnected d/t pending transfer to MRI) upon OT entry to room.    General Precautions: Standard, fall, seizure  Orthopedic Precautions: N/A  Braces: N/A  Respiratory Status: Room air    Occupational Performance:    Bed Mobility:    Patient completed Scooting anteriorly and laterally with stand by assistance  Patient completed Supine to Sit with stand by assistance  Patient completed Sit to Supine with stand by assistance    Functional Mobility/Transfers:  Patient completed Sit <> Stand Transfer with contact guard assistance  with  hand-held assist   Functional Mobility: pt stood statically at EOB with HHA and CGA with no c/o dizziness. Pt then took a sidestep at EOB with HHA and CGA, but then began urinating on herself. Pt assisted to sit EOB. Rolling L<>R with supervision for therapy to change linen/pad.     Activities of Daily Living:  Grooming: stand by assistance seated EOB for pt to clean her legs after incontinent episode  Upper Body Dressing: minimum assistance to adjust gown while seated unsupported at EOB d/t multiple lines    Cognitive/Visual Perceptual:  Cognitive/Psychosocial Skills:     -       Follows Commands/attention:follows simple commands  -       Communication: dysarthria  -       Memory: No Deficits noted  -       Safety awareness/insight to disability: mildly impaired (2* facial twitching episodes)    -       Mood/Affect/Coping skills/emotional control: Cooperative and Pleasant  Visual/Perceptual:      -Intact  acuity and R/L discrimination      Physical Exam:  Balance:    -       seated: SBA; standing: CGA with HHA  Postural examination/scapula alignment:    -       Rounded shoulders  Skin integrity: Visible skin intact  Edema:  no BUE edema noted  Sensation:    -       Intact   light/touch BUE with exception of pt reporting intermittent R hand numbness (but not numb currently)  Dominant hand:    -       Right  Upper Extremity Range of Motion:     -       Right Upper Extremity: WFL  -       Left Upper Extremity: WFL  Upper Extremity Strength:    -       Right Upper Extremity: WFL  -       Left Upper Extremity: WFL   Strength:    -       Right Upper Extremity: WFL  -       Left Upper Extremity: WFL  Fine Motor Coordination:    -       Intact  Left hand, manipulation of objects and Right hand, manipulation of objects  Gross motor coordination:   WFL    AMPAC 6 Click ADL:  AMPAC Total Score: 22    Treatment & Education:  Pt educated on OT role/POC.   Importance of OOB activity with staff assistance.  Safety during functional t/f and mobility   Multiple self-care tasks/functional mobility completed- assistance level noted above   All questions/concerns answered within OT scope of practice       Patient left HOB elevated with all lines intact, call button in reach, nurse, Shante, present (notified of episode), and all needs met/within reach    GOALS:   Multidisciplinary Problems       Occupational Therapy Goals          Problem: Occupational Therapy    Goal Priority Disciplines Outcome Interventions   Occupational Therapy Goal     OT, PT/OT Ongoing, Progressing    Description: Goals to be met by: 04/24/23      Patient will increase functional independence with ADLs by performing:    UE Dressing with Modified [place].  LE Dressing with Modified [place].  Grooming while standing at sink with Supervision.  Toileting from bedside commode with Supervision for hygiene and clothing management.   Supine to sit with Modified [place].  Step transfer with Supervision  Toilet transfer to bedside commode with Supervision.  Upper extremity exercise program x15 reps per handout, with independence.                         History:     Past Medical History:   Diagnosis Date    Allergy      Altered mental status 06/19/2022    DYSARTHRIA, SPASTIC MOVEMENTS & DIFFICULTY SWALLOWING    Anemia     Anxiety     Arthritis     Cataract     both removed    Colon polyps     Coronary artery disease     Depression     Diabetes mellitus, type II     Disorder of kidney and ureter     Fibromyalgia     Follicular lymphoma     GERD (gastroesophageal reflux disease)     HTN (hypertension)     Hyperlipidemia     MI (myocardial infarction) 03/2019    Personal history of colonic polyps     Restless leg syndrome     Stroke          Past Surgical History:   Procedure Laterality Date    COLONOSCOPY  11/07/2012    Colon polyp found; repeat in 5 years    ELBOW SURGERY Right 2015    dislocation repair     ESOPHAGOGASTRODUODENOSCOPY  11/07/2012    atrophic gastritis, H pylori testing negative    INCISION AND DRAINAGE FOOT Right 6/2/2021    Procedure: INCISION AND DRAINAGE, FOOT, bone biopsy;  Surgeon: Quiana Penn DPM;  Location: [facility] OR;  Service: Podiatry;  Laterality: Right;    KNEE SURGERY Bilateral 2015    scoped    LEFT HEART CATHETERIZATION Left 3/29/2019    Procedure: Left heart cath;  Surgeon: Bladimir Barbosa MD;  Location: [facility] CATH LAB;  Service: Cardiology;  Laterality: Left;    LEFT HEART CATHETERIZATION Left 11/18/2019    Procedure: Left heart cath;  Surgeon: Karl Rico MD;  Location: [facility] CATH LAB;  Service: Cardiology;  Laterality: Left;    LEFT HEART CATHETERIZATION Left 1/8/2020    Procedure: Left heart cath, right radial, noon start;  Surgeon: Christos Monreal MD;  Location: [facility] CATH LAB;  Service: Cardiology;  Laterality: Left;  RN Pre Op 1-6-20.  To be admitted 1-7-20 sor Aspirin Disensitation    TONSILLECTOMY  1955    ULTRASOUND GUIDANCE  1/8/2020    Procedure: Ultrasound Guidance;  Surgeon: Christos Monreal MD;  Location: [facility] CATH LAB;  Service: Cardiology;;       Time Tracking:     OT Date of Treatment: 04/10/23  OT Start Time: 1321  OT Stop Time: 1335  OT Total Time (min): 14 min    Billable  Minutes:Evaluation 14 min (co-eval with PT)    4/10/2023

## 2023-04-10 NOTE — PLAN OF CARE
Problem: Occupational Therapy  Goal: Occupational Therapy Goal  Description: Goals to be met by: 04/24/23      Patient will increase functional independence with ADLs by performing:    UE Dressing with Modified [place].  LE Dressing with Modified [place].  Grooming while standing at sink with Supervision.  Toileting from bedside commode with Supervision for hygiene and clothing management.   Supine to sit with Modified [place].  Step transfer with Supervision  Toilet transfer to bedside commode with Supervision.  Upper extremity exercise program x15 reps per handout, with independence.    Outcome: Ongoing, Progressing

## 2023-04-10 NOTE — PLAN OF CARE
"DC NEEDS ASSESSMENT    PCP:  Dr Anastasiya Paris  Pharmacy:  [facility]     Patient arrived from:  Home with fmy  Discharge plan at this time:  1)  Home with fmy  2)  home with HH and requested hospital bed  Barriers to DC:  Patient admits to non compliance with diabetic regimen     Patient's choice of someone to speak on her behalf if unable if no designated MPOA: Either daughter:  Noelle or Brooke      CM will continue to follow and finalize plans  CM provided patient and family with contact info and encouraged them to call for any discharge needs.       04/10/23 8427   Discharge Assessment   Assessment Type Discharge Planning Assessment   Confirmed/corrected address, phone number and insurance Yes   Confirmed Demographics Correct on Facesheet   Source of Information patient;family  (daughter at bedside)   Communicated MIKHAIL with patient/caregiver Yes   People in Home child(daniel), adult   Do you expect to return to your current living situation? Yes   Do you have help at home or someone to help you manage your care at home? Yes   Who are your caregiver(s) and their phone number(s)? Adult children   Prior to hospitilization cognitive status: Alert/Oriented   Current cognitive status: Alert/Oriented   Equipment Currently Used at Home   (has a Rollator and bath bench that belonged to her  )   Readmission within 30 days? No   Patient currently being followed by outpatient case management? No   Do you currently have service(s) that help you manage your care at home? No   Do you take prescription medications? Yes   Do you have prescription coverage? Yes   Coverage [employer]   Do you have any problems affording any of your prescribed medications? No   Who is going to help you get home at discharge? "One of my daughters"   How do you get to doctors appointments? family or friend will provide   Are you on dialysis? No   Do you take coumadin? No   DME Needed Upon Discharge  hospital bed   Discharge Plan discussed with: " Patient;Adult children   Discharge Barriers Identified None

## 2023-04-10 NOTE — CONSULTS
[facility] - Intensive Care  Neurology  Consult Note    Patient Name: Lorena Contreras  MRN: [id number]  Admission Date: 4/9/2023  Hospital Length of Stay: 0 days  Code Status: DNR   Attending Provider: Rios Jerry MD   Consulting Provider: Shaq Spicer MD  Primary Care Physician: Jorge Paris MD  Principal Problem:DKA, type 2    Inpatient consult to Neurology  Consult performed by: Shaq Spicer MD  Consult ordered by: Rios Jerry MD      Subjective:     Chief Complaint: Facial twitching     HPI:  71 years old female with medical Hx of CAD (s/p stenting in 2019), HFpEF,  HTN, DM, hyperlipidemia, MDD, anxiety, TIA comes to ED due to facial twitching. Four days prior pt states that she began to drool from the right corner of her mouth and noticed difficulty swallowing. Later she had episodes of right facial twitching. Ms. Contreras also reported numbness on right limbs. Pt found to be in DKA admitted to ICU.      Past Medical History:   Diagnosis Date    Allergy     Altered mental status 06/19/2022    DYSARTHRIA, SPASTIC MOVEMENTS & DIFFICULTY SWALLOWING    Anemia     Anxiety     Arthritis     Cataract     both removed    Colon polyps     Coronary artery disease     Depression     Diabetes mellitus, type II     Disorder of kidney and ureter     Fibromyalgia     Follicular lymphoma     GERD (gastroesophageal reflux disease)     HTN (hypertension)     Hyperlipidemia     MI (myocardial infarction) 03/2019    Personal history of colonic polyps     Restless leg syndrome     Stroke        Past Surgical History:   Procedure Laterality Date    COLONOSCOPY  11/07/2012    Colon polyp found; repeat in 5 years    ELBOW SURGERY Right 2015    dislocation repair     ESOPHAGOGASTRODUODENOSCOPY  11/07/2012    atrophic gastritis, H pylori testing negative    INCISION AND DRAINAGE FOOT Right 6/2/2021    Procedure: INCISION AND DRAINAGE, FOOT, bone biopsy;  Surgeon: Quiana Penn DPM;  Location: [facility] OR;  Service:  Podiatry;  Laterality: Right;    KNEE SURGERY Bilateral 2015    scoped    LEFT HEART CATHETERIZATION Left 3/29/2019    Procedure: Left heart cath;  Surgeon: Bladimir Barbosa MD;  Location: [facility] CATH LAB;  Service: Cardiology;  Laterality: Left;    LEFT HEART CATHETERIZATION Left 11/18/2019    Procedure: Left heart cath;  Surgeon: Karl Rico MD;  Location: [facility] CATH LAB;  Service: Cardiology;  Laterality: Left;    LEFT HEART CATHETERIZATION Left 1/8/2020    Procedure: Left heart cath, right radial, noon start;  Surgeon: Christos Monreal MD;  Location: [facility] CATH LAB;  Service: Cardiology;  Laterality: Left;  RN Pre Op 1-6-20.  To be admitted 1-7-20 sor Aspirin Disensitation    TONSILLECTOMY  1955    ULTRASOUND GUIDANCE  1/8/2020    Procedure: Ultrasound Guidance;  Surgeon: Christos Monreal MD;  Location: [facility] CATH LAB;  Service: Cardiology;;       Review of patient's allergies indicates:   Allergen Reactions    Novolin 70/30 (semi-synthetic) Nausea And Vomiting     Severe vomiting on Relion 70/30    Shellfish containing products Swelling    Sulfa (sulfonamide antibiotics) Anaphylaxis    Talwin [pentazocine lactate] Anaphylaxis    Victoza [liraglutide] Nausea And Vomiting    Glipizide Nausea Only    Citric acid     Codeine     Influenza virus vaccines Hives    Iodine and iodide containing products Hives    Rituxan [rituximab] Hives    Zoloft [sertraline] Nausea And Vomiting       Current Neurological Medications:     No current facility-administered medications on file prior to encounter.     Current Outpatient Medications on File Prior to Encounter   Medication Sig    amLODIPine (NORVASC) 10 MG tablet TAKE 1 TABLET EVERY DAY    aspirin 81 MG Chew Take 1 tablet (81 mg total) by mouth once daily.    atenoloL (TENORMIN) 50 MG tablet Take 1 tablet (50 mg total) by mouth 2 (two) times daily.    atorvastatin (LIPITOR) 80 MG tablet Take 1 tablet (80 mg total) by mouth every evening.    FLUoxetine 40 MG capsule Take 1  capsule (40 mg total) by mouth once daily. (Patient taking differently: Take 80 mg by mouth once daily.)    gabapentin (NEURONTIN) 300 MG capsule Take 2 capsules by mouth twice daily    isosorbide mononitrate (IMDUR) 30 MG 24 hr tablet Take 1 tablet (30 mg total) by mouth once daily.    pantoprazole (PROTONIX) 40 MG tablet Take 1 tablet by mouth once daily    ticagrelor (BRILINTA) 90 mg tablet Take 1 tablet (90 mg total) by mouth 2 (two) times daily.    acetaminophen (TYLENOL) 500 MG tablet Take 1 tablet (500 mg total) by mouth every 4 (four) hours as needed for Pain. (Patient taking differently: Take 1,000 mg by mouth every 4 (four) hours as needed for Pain.)    albuterol (PROVENTIL/VENTOLIN HFA) 90 mcg/actuation inhaler Inhale 2 puffs into the lungs every 6 (six) hours as needed for Wheezing or Shortness of Breath.    ASCORBIC ACID, VITAMIN C, ORAL Take by mouth once daily.    Bacillus coagulans (DIGESTIVE ADVANTAGE IMMUNE ORAL) Take by mouth.    blood sugar diagnostic (FREESTYLE TEST) Strp 1 strip by Misc.(Non-Drug; Combo Route) route 4 (four) times daily.    cholecalciferol, vitamin D3, (VITAMIN D3) 50 mcg (2,000 unit) Cap Take 2 capsules by mouth 2 (two) times daily.    cyanocobalamin (VITAMIN B-12) 1000 MCG tablet Take 100 mcg by mouth once daily.    diclofenac sodium (VOLTAREN TOP) Apply topically.    dulaglutide (TRULICITY) 4.5 mg/0.5 mL pen injector Inject 4.5 mg into the skin every 7 days.    EPINEPHrine (EPIPEN) 0.3 mg/0.3 mL AtIn Inject 0.3 mLs (0.3 mg total) into the muscle once. for 1 dose    ESOMEPRAZOLE MAGNESIUM ORAL Take by mouth as needed.    ferrous sulfate 325 (65 FE) MG EC tablet Take 1 tablet (325 mg total) by mouth once daily.    flavoring agent, bulk, (CLOVE FLAVORING) Oil 1 Dose by Misc.(Non-Drug; Combo Route) route every 4 (four) hours as needed (dental pain).    fluticasone propionate (FLONASE) 50 mcg/actuation nasal spray 2 sprays (100 mcg total) by Each Nostril route once daily.     "glimepiride (AMARYL) 2 MG tablet Take 1 tablet (2 mg total) by mouth before breakfast.    hydroCHLOROthiazide (HYDRODIURIL) 25 MG tablet Take 1 tablet (25 mg total) by mouth once daily.    insulin aspart U-100 (NOVOLOG FLEXPEN U-100 INSULIN) 100 unit/mL (3 mL) InPn pen Use with sliding scale before meals: 150-200=+2, 201-250=+4; 251-300=+6; 301-350=+8, over 350=+10 units    insulin degludec (TRESIBA FLEXTOUCH U-200) 200 unit/mL (3 mL) insulin pen Inject 22 units once daily and titrate to 40 units    lancets 32 gauge Misc 1 lancet by Misc.(Non-Drug; Combo Route) route 4 (four) times daily.    loratadine (CLARITIN) 10 mg tablet Take 1 tablet (10 mg total) by mouth every morning.    magnesium oxide (MAG-OX) 400 mg tablet Take 400 mg by mouth once daily.    meclizine (ANTIVERT) 25 mg tablet Take 1 tablet (25 mg total) by mouth 3 (three) times daily as needed for Dizziness.    melatonin 10 mg Cap Take 10 mg by mouth nightly.    metFORMIN (GLUCOPHAGE) 1000 MG tablet Take 1 tablet (1,000 mg total) by mouth 2 (two) times daily with meals.    multivitamin/iron/folic acid (CENTRUM COMPLETE ORAL) Take by mouth.    nitroGLYCERIN (NITROSTAT) 0.4 MG SL tablet PLACE 1 TABLET UNDER THE TONGUE EVERY FIVE MINUTES AS NEEDED FOR CHEST PAIN AS DIRECTED    pen needle, diabetic (BD ULTRA-FINE SAGAR PEN NEEDLE) 32 gauge x 5/32" Ndle USE WITH NOVOLOG MIX FLEXPENS    TRUEPLUS LANCETS 30 gauge Misc     valsartan (DIOVAN) 160 MG tablet Take 1 tablet (160 mg total) by mouth once daily.    vitamin E 1000 UNIT capsule Take 1,000 Units by mouth once daily.      Family History       Problem Relation (Age of Onset)    Allergy (severe) Daughter    Cancer Mother, Father    Diabetes Sister    Heart disease Mother    Hypertension Sister    Lung cancer Brother    No Known Problems Daughter          Tobacco Use    Smoking status: Never    Smokeless tobacco: Never   Substance and Sexual Activity    Alcohol use: Not Currently    Drug use: Never    Sexual " activity: Not Currently     Partners: Male     Review of Systems   Constitutional:  Negative for fever.   HENT:  Negative for trouble swallowing.    Eyes:  Negative for photophobia.   Respiratory:  Negative for shortness of breath.    Cardiovascular:  Negative for chest pain.   Gastrointestinal:  Negative for abdominal pain.   Genitourinary:  Negative for flank pain.   Musculoskeletal:  Negative for back pain.   Neurological:  Negative for headaches.   Psychiatric/Behavioral:  Negative for confusion.        Objective:     Vital Signs (Most Recent):  Temp: 96.3 °F (35.7 °C) (04/10/23 0745)  Pulse: 81 (04/10/23 1145)  Resp: 18 (04/10/23 1145)  BP: (!) 146/66 (04/10/23 1145)  SpO2: 100 % (04/10/23 1145)   Vital Signs (24h Range):  Temp:  [96.3 °F (35.7 °C)-98.2 °F (36.8 °C)] 96.3 °F (35.7 °C)  Pulse:  [67-90] 81  Resp:  [12-30] 18  SpO2:  [96 %-100 %] 100 %  BP: ()/(52-86) 146/66     Weight: 73 kg (160 lb 15 oz)  Body mass index is 25.21 kg/m².    Physical Exam  Constitutional:       General: She is not in acute distress.  HENT:      Head: Normocephalic.   Eyes:      General:         Right eye: No discharge.         Left eye: No discharge.   Cardiovascular:      Rate and Rhythm: Normal rate.   Pulmonary:      Breath sounds: Normal breath sounds.   Abdominal:      Palpations: Abdomen is soft.   Musculoskeletal:         General: No swelling.      Cervical back: Neck supple.   Skin:     Findings: No rash.   Neurological:      Coordination: Finger-Nose-Finger Test and Heel to Shin Test normal.       NEUROLOGICAL EXAMINATION:     CRANIAL NERVES     CN III, IV, VI   Right pupil: Size: 2 mm.   Left pupil: Size: 2 mm.   Nystagmus: none   Conjugate gaze: present    CN V   Right facial sensation deficit: none  Left facial sensation deficit: none    CN VII   Right facial weakness: central  Left facial weakness: none    CN XII   Tongue deviation: none    MOTOR EXAM     Strength   Strength 5/5 except as noted.        Mild  right pronator drift     SENSORY EXAM   Right arm light touch: normal  Left arm light touch: normal  Right leg light touch: normal  Left leg light touch: normal    GAIT AND COORDINATION      Coordination   Finger to nose coordination: normal  Heel to shin coordination: normal    Significant Labs: CBC:   Recent Labs   Lab 04/10/23  0012   WBC 12.73*   HGB 13.0   HCT 38.8        CMP:   Recent Labs   Lab 04/10/23  0012 04/10/23  0416 04/10/23  0832 04/10/23  1139   * 144* 83 155*   * 137 140 137   K 4.1 3.6 3.9 5.3*   CL 99 108 108 108   CO2 16* 22* 21* 19*   BUN 37* 34* 33* 30*   CREATININE 2.2* 1.5* 1.4 1.3   CALCIUM 9.7 8.8 8.6* 8.9   MG  --  1.4*  --   --    PROT 8.2  --   --   --    ALBUMIN 3.5  --   --   --    BILITOT 0.4  --   --   --    ALKPHOS 167*  --   --   --    AST 40  --   --   --    ALT 41  --   --   --    ANIONGAP 17* 7* 11 10     Urine Studies:   Recent Labs   Lab 04/10/23  1108   COLORU Yellow   APPEARANCEUA Clear   PHUR 5.0   SPECGRAV 1.010   PROTEINUA 1+*   GLUCUA 4+*   KETONESU Negative   BILIRUBINUA Negative   OCCULTUA Negative   NITRITE Positive*   UROBILINOGEN Negative   LEUKOCYTESUR 3+*   RBCUA 1   WBCUA 21*   BACTERIA Occasional   SQUAMEPITHEL 1   HYALINECASTS 0       Significant Imaging: I have reviewed all pertinent imaging results/findings within the past 24 hours.    Head CT  FINDINGS:  There is mild generalized cerebral volume loss.  No evidence of acute/recent major vascular distribution cerebral infarction, intraparenchymal hemorrhage, or intra-axial space occupying lesion. The ventricular system is stable in size and configuration with cavum septum pellucidum noted.  No effacement of the skull-base cisterns. No abnormal extra-axial fluid collections or blood products. Visualized paranasal sinuses and mastoid air cells are clear. The calvarium shows no significant abnormality.     Impression:     No acute intracranial abnormalities identified.        Electronically  signed by: Leatha Palomo MD  Date:                                            04/10/2023  Time:                                           00:43    Assessment and Plan:     71 y/o female consulted for possible stroke    Right sided weakness: stroke is a concern as well as focal seizures. On exam mild weakness on right side  MRI brain.  ASA 81 mg daily. Statin  DKA management by primary team.  Seizure: possible focal seizures.   EEG.    Active Diagnoses:    Diagnosis Date Noted POA    PRINCIPAL PROBLEM:  DKA, type 2 [E11.10] 04/10/2023 Yes    KYA (acute kidney injury) [N17.9] 04/10/2023 Unknown    Seizure-like activity [R56.9] 04/10/2023 Unknown    Stroke-like symptoms [R29.90] 04/10/2023 Unknown    Leukocytosis [D72.829] 04/10/2023 Unknown    Chronic diastolic heart failure [I50.32] 08/10/2021 Yes    Coronary artery disease of native artery of native heart with stable angina pectoris [I25.118] 03/29/2019 Yes    Hyperlipidemia [E78.5] 07/30/2015 Yes    Hypertension associated with diabetes [E11.59, I15.2] 01/24/2014 Yes     Chronic      Problems Resolved During this Admission:       VTE Risk Mitigation (From admission, onward)           Ordered     heparin (porcine) injection 5,000 Units  Every 8 hours         04/10/23 0217     IP VTE HIGH RISK PATIENT  Once         04/10/23 0217     Place BORIS hose  Until discontinued         04/10/23 0217     Place sequential compression device  Until discontinued         04/10/23 0217     Place sequential compression device  Until discontinued         04/10/23 0107                    Thank you for your consult. I will follow-up with patient. Please contact us if you have any additional questions.    Shaq Spicer MD  Neurology  [facility] - Intensive Care

## 2023-04-10 NOTE — PLAN OF CARE
Problem: SLP  Goal: SLP Goal  Description: ST. Pt will tolerate PO diet of puree consistency with thin liquids without overt s/s of aspiration.  2. Pt will perform OMEs to improve right facial droop independently.  3. Pt will IND utilize speech intelligibility strategies at level of conversation.                         Outcome: Ongoing, Progressing      B/s swallow eval completed. ST recs puree diet and thin liquids. Whole meds in applesauce. Moderate dysarthria. ST following.

## 2023-04-11 VITALS
BODY MASS INDEX: 25.26 KG/M2 | DIASTOLIC BLOOD PRESSURE: 70 MMHG | SYSTOLIC BLOOD PRESSURE: 170 MMHG | WEIGHT: 160.94 LBS | HEART RATE: 73 BPM | HEIGHT: 67 IN | OXYGEN SATURATION: 96 % | RESPIRATION RATE: 18 BRPM | TEMPERATURE: 98 F

## 2023-04-11 PROBLEM — R56.9 SEIZURE-LIKE ACTIVITY: Status: RESOLVED | Noted: 2023-04-10 | Resolved: 2023-04-11

## 2023-04-11 PROBLEM — D72.829 LEUKOCYTOSIS: Status: RESOLVED | Noted: 2023-04-10 | Resolved: 2023-04-11

## 2023-04-11 PROBLEM — N17.9 AKI (ACUTE KIDNEY INJURY): Status: RESOLVED | Noted: 2023-04-10 | Resolved: 2023-04-11

## 2023-04-11 PROBLEM — R29.90 STROKE-LIKE SYMPTOMS: Status: RESOLVED | Noted: 2023-04-10 | Resolved: 2023-04-11

## 2023-04-11 PROBLEM — E11.10 DKA, TYPE 2: Status: RESOLVED | Noted: 2023-04-10 | Resolved: 2023-04-11

## 2023-04-11 LAB
ANION GAP SERPL CALC-SCNC: 7 MMOL/L (ref 8–16)
ANION GAP SERPL CALC-SCNC: 8 MMOL/L (ref 8–16)
BASOPHILS # BLD AUTO: 0.07 K/UL (ref 0–0.2)
BASOPHILS NFR BLD: 0.8 % (ref 0–1.9)
BUN SERPL-MCNC: 17 MG/DL (ref 8–23)
BUN SERPL-MCNC: 18 MG/DL (ref 8–23)
BUN SERPL-MCNC: 18 MG/DL (ref 8–23)
BUN SERPL-MCNC: 22 MG/DL (ref 8–23)
BUN SERPL-MCNC: 22 MG/DL (ref 8–23)
CALCIUM SERPL-MCNC: 8.3 MG/DL (ref 8.7–10.5)
CALCIUM SERPL-MCNC: 8.4 MG/DL (ref 8.7–10.5)
CALCIUM SERPL-MCNC: 8.6 MG/DL (ref 8.7–10.5)
CHLORIDE SERPL-SCNC: 108 MMOL/L (ref 95–110)
CHLORIDE SERPL-SCNC: 109 MMOL/L (ref 95–110)
CHLORIDE SERPL-SCNC: 113 MMOL/L (ref 95–110)
CO2 SERPL-SCNC: 19 MMOL/L (ref 23–29)
CO2 SERPL-SCNC: 20 MMOL/L (ref 23–29)
CO2 SERPL-SCNC: 21 MMOL/L (ref 23–29)
CREAT SERPL-MCNC: 1 MG/DL (ref 0.5–1.4)
CREAT SERPL-MCNC: 1.1 MG/DL (ref 0.5–1.4)
CREAT SERPL-MCNC: 1.1 MG/DL (ref 0.5–1.4)
CREAT SERPL-MCNC: 1.2 MG/DL (ref 0.5–1.4)
CREAT SERPL-MCNC: 1.2 MG/DL (ref 0.5–1.4)
DIFFERENTIAL METHOD: ABNORMAL
EOSINOPHIL # BLD AUTO: 0.4 K/UL (ref 0–0.5)
EOSINOPHIL NFR BLD: 4.7 % (ref 0–8)
ERYTHROCYTE [DISTWIDTH] IN BLOOD BY AUTOMATED COUNT: 13.5 % (ref 11.5–14.5)
EST. GFR  (NO RACE VARIABLE): 48 ML/MIN/1.73 M^2
EST. GFR  (NO RACE VARIABLE): 48 ML/MIN/1.73 M^2
EST. GFR  (NO RACE VARIABLE): 53 ML/MIN/1.73 M^2
EST. GFR  (NO RACE VARIABLE): 53 ML/MIN/1.73 M^2
EST. GFR  (NO RACE VARIABLE): 60 ML/MIN/1.73 M^2
GLUCOSE SERPL-MCNC: 130 MG/DL (ref 70–110)
GLUCOSE SERPL-MCNC: 150 MG/DL (ref 70–110)
GLUCOSE SERPL-MCNC: 175 MG/DL (ref 70–110)
GLUCOSE SERPL-MCNC: 208 MG/DL (ref 70–110)
GLUCOSE SERPL-MCNC: 344 MG/DL (ref 70–110)
HCT VFR BLD AUTO: 35.2 % (ref 37–48.5)
HGB BLD-MCNC: 11.2 G/DL (ref 12–16)
IMM GRANULOCYTES # BLD AUTO: 0.03 K/UL (ref 0–0.04)
IMM GRANULOCYTES NFR BLD AUTO: 0.3 % (ref 0–0.5)
LYMPHOCYTES # BLD AUTO: 2.1 K/UL (ref 1–4.8)
LYMPHOCYTES NFR BLD: 24.9 % (ref 18–48)
MAGNESIUM SERPL-MCNC: 1.8 MG/DL (ref 1.6–2.6)
MCH RBC QN AUTO: 27.9 PG (ref 27–31)
MCHC RBC AUTO-ENTMCNC: 31.8 G/DL (ref 32–36)
MCV RBC AUTO: 88 FL (ref 82–98)
MONOCYTES # BLD AUTO: 0.8 K/UL (ref 0.3–1)
MONOCYTES NFR BLD: 9.3 % (ref 4–15)
NEUTROPHILS # BLD AUTO: 5.2 K/UL (ref 1.8–7.7)
NEUTROPHILS NFR BLD: 60 % (ref 38–73)
NRBC BLD-RTO: 0 /100 WBC
PHOSPHATE SERPL-MCNC: 2.7 MG/DL (ref 2.7–4.5)
PLATELET # BLD AUTO: 174 K/UL (ref 150–450)
PMV BLD AUTO: 13 FL (ref 9.2–12.9)
POCT GLUCOSE: 138 MG/DL (ref 70–110)
POCT GLUCOSE: 168 MG/DL (ref 70–110)
POCT GLUCOSE: 231 MG/DL (ref 70–110)
POCT GLUCOSE: 290 MG/DL (ref 70–110)
POCT GLUCOSE: 337 MG/DL (ref 70–110)
POTASSIUM SERPL-SCNC: 4.3 MMOL/L (ref 3.5–5.1)
POTASSIUM SERPL-SCNC: 4.3 MMOL/L (ref 3.5–5.1)
POTASSIUM SERPL-SCNC: 4.4 MMOL/L (ref 3.5–5.1)
POTASSIUM SERPL-SCNC: 4.5 MMOL/L (ref 3.5–5.1)
POTASSIUM SERPL-SCNC: 5.1 MMOL/L (ref 3.5–5.1)
RBC # BLD AUTO: 4.01 M/UL (ref 4–5.4)
SODIUM SERPL-SCNC: 137 MMOL/L (ref 136–145)
SODIUM SERPL-SCNC: 137 MMOL/L (ref 136–145)
SODIUM SERPL-SCNC: 139 MMOL/L (ref 136–145)
SODIUM SERPL-SCNC: 140 MMOL/L (ref 136–145)
SODIUM SERPL-SCNC: 141 MMOL/L (ref 136–145)
WBC # BLD AUTO: 8.6 K/UL (ref 3.9–12.7)

## 2023-04-11 PROCEDURE — 25000003 PHARM REV CODE 250: Mod: HCNC | Performed by: HOSPITALIST

## 2023-04-11 PROCEDURE — 63600175 PHARM REV CODE 636 W HCPCS: Mod: HCNC | Performed by: STUDENT IN AN ORGANIZED HEALTH CARE EDUCATION/TRAINING PROGRAM

## 2023-04-11 PROCEDURE — 85025 COMPLETE CBC W/AUTO DIFF WBC: CPT | Mod: HCNC | Performed by: STUDENT IN AN ORGANIZED HEALTH CARE EDUCATION/TRAINING PROGRAM

## 2023-04-11 PROCEDURE — 99232 SBSQ HOSP IP/OBS MODERATE 35: CPT | Mod: HCNC,,, | Performed by: PSYCHIATRY & NEUROLOGY

## 2023-04-11 PROCEDURE — 99232 PR SUBSEQUENT HOSPITAL CARE,LEVL II: ICD-10-PCS | Mod: HCNC,,, | Performed by: PSYCHIATRY & NEUROLOGY

## 2023-04-11 PROCEDURE — 96374 THER/PROPH/DIAG INJ IV PUSH: CPT | Mod: 59

## 2023-04-11 PROCEDURE — 25000003 PHARM REV CODE 250: Mod: HCNC | Performed by: STUDENT IN AN ORGANIZED HEALTH CARE EDUCATION/TRAINING PROGRAM

## 2023-04-11 PROCEDURE — 80048 BASIC METABOLIC PNL TOTAL CA: CPT | Mod: 91,HCNC | Performed by: STUDENT IN AN ORGANIZED HEALTH CARE EDUCATION/TRAINING PROGRAM

## 2023-04-11 PROCEDURE — 97116 GAIT TRAINING THERAPY: CPT | Mod: HCNC,CQ

## 2023-04-11 PROCEDURE — 92526 ORAL FUNCTION THERAPY: CPT | Mod: HCNC

## 2023-04-11 PROCEDURE — 83735 ASSAY OF MAGNESIUM: CPT | Mod: HCNC | Performed by: STUDENT IN AN ORGANIZED HEALTH CARE EDUCATION/TRAINING PROGRAM

## 2023-04-11 PROCEDURE — G0378 HOSPITAL OBSERVATION PER HR: HCPCS | Mod: HCNC

## 2023-04-11 PROCEDURE — 97535 SELF CARE MNGMENT TRAINING: CPT | Mod: HCNC

## 2023-04-11 PROCEDURE — 36415 COLL VENOUS BLD VENIPUNCTURE: CPT | Mod: HCNC | Performed by: STUDENT IN AN ORGANIZED HEALTH CARE EDUCATION/TRAINING PROGRAM

## 2023-04-11 PROCEDURE — 97110 THERAPEUTIC EXERCISES: CPT | Mod: HCNC,CQ

## 2023-04-11 PROCEDURE — 84100 ASSAY OF PHOSPHORUS: CPT | Mod: HCNC | Performed by: STUDENT IN AN ORGANIZED HEALTH CARE EDUCATION/TRAINING PROGRAM

## 2023-04-11 PROCEDURE — 63600175 PHARM REV CODE 636 W HCPCS: Mod: HCNC | Performed by: PSYCHIATRY & NEUROLOGY

## 2023-04-11 PROCEDURE — 96361 HYDRATE IV INFUSION ADD-ON: CPT

## 2023-04-11 RX ORDER — CEPHALEXIN 500 MG/1
500 CAPSULE ORAL 4 TIMES DAILY
Qty: 20 CAPSULE | Refills: 0 | Status: SHIPPED | OUTPATIENT
Start: 2023-04-11 | End: 2023-04-16

## 2023-04-11 RX ORDER — VALSARTAN 80 MG/1
160 TABLET ORAL DAILY
Status: DISCONTINUED | OUTPATIENT
Start: 2023-04-11 | End: 2023-04-11 | Stop reason: HOSPADM

## 2023-04-11 RX ORDER — LEVETIRACETAM 500 MG/1
500 TABLET ORAL 2 TIMES DAILY
Qty: 60 TABLET | Refills: 11 | Status: SHIPPED | OUTPATIENT
Start: 2023-04-11 | End: 2023-04-28

## 2023-04-11 RX ADMIN — ATENOLOL 50 MG: 25 TABLET ORAL at 08:04

## 2023-04-11 RX ADMIN — SODIUM CHLORIDE: 9 INJECTION, SOLUTION INTRAVENOUS at 04:04

## 2023-04-11 RX ADMIN — ACETAMINOPHEN 650 MG: 325 TABLET ORAL at 06:04

## 2023-04-11 RX ADMIN — INSULIN ASPART 2 UNITS: 100 INJECTION, SOLUTION INTRAVENOUS; SUBCUTANEOUS at 12:04

## 2023-04-11 RX ADMIN — HEPARIN SODIUM 5000 UNITS: 5000 INJECTION INTRAVENOUS; SUBCUTANEOUS at 01:04

## 2023-04-11 RX ADMIN — FLUOXETINE 40 MG: 20 CAPSULE ORAL at 08:04

## 2023-04-11 RX ADMIN — VALSARTAN 160 MG: 80 TABLET, FILM COATED ORAL at 02:04

## 2023-04-11 RX ADMIN — ASPIRIN 81 MG CHEWABLE TABLET 81 MG: 81 TABLET CHEWABLE at 08:04

## 2023-04-11 RX ADMIN — CEFTRIAXONE 2 G: 2 INJECTION, SOLUTION INTRAVENOUS at 12:04

## 2023-04-11 RX ADMIN — INSULIN ASPART 5 UNITS: 100 INJECTION, SOLUTION INTRAVENOUS; SUBCUTANEOUS at 04:04

## 2023-04-11 RX ADMIN — TICAGRELOR 90 MG: 90 TABLET ORAL at 08:04

## 2023-04-11 RX ADMIN — GABAPENTIN 600 MG: 300 CAPSULE ORAL at 08:04

## 2023-04-11 RX ADMIN — INSULIN DETEMIR 6 UNITS: 100 INJECTION, SOLUTION SUBCUTANEOUS at 09:04

## 2023-04-11 RX ADMIN — HEPARIN SODIUM 5000 UNITS: 5000 INJECTION INTRAVENOUS; SUBCUTANEOUS at 05:04

## 2023-04-11 RX ADMIN — PANTOPRAZOLE SODIUM 40 MG: 40 TABLET, DELAYED RELEASE ORAL at 08:04

## 2023-04-11 RX ADMIN — INSULIN ASPART 3 UNITS: 100 INJECTION, SOLUTION INTRAVENOUS; SUBCUTANEOUS at 09:04

## 2023-04-11 RX ADMIN — MUPIROCIN: 20 OINTMENT TOPICAL at 09:04

## 2023-04-11 RX ADMIN — LIDOCAINE 1 PATCH: 50 PATCH CUTANEOUS at 05:04

## 2023-04-11 RX ADMIN — LEVETIRACETAM 500 MG: 100 INJECTION, SOLUTION INTRAVENOUS at 08:04

## 2023-04-11 NOTE — PLAN OF CARE
Problem: Adult Inpatient Plan of Care  Goal: Plan of Care Review  Outcome: Ongoing, Progressing  Goal: Patient-Specific Goal (Individualized)  Outcome: Ongoing, Progressing  Goal: Absence of Hospital-Acquired Illness or Injury  Outcome: Ongoing, Progressing  Goal: Optimal Comfort and Wellbeing  Outcome: Ongoing, Progressing  Goal: Readiness for Transition of Care  Outcome: Ongoing, Progressing     Problem: Diabetic Ketoacidosis  Goal: Fluid and Electrolyte Balance with Absence of Ketosis  Outcome: Ongoing, Progressing     Problem: Diabetes Comorbidity  Goal: Blood Glucose Level Within Targeted Range  Outcome: Ongoing, Progressing     Problem: Fluid and Electrolyte Imbalance (Acute Kidney Injury/Impairment)  Goal: Fluid and Electrolyte Balance  Outcome: Ongoing, Progressing     Problem: Oral Intake Inadequate (Acute Kidney Injury/Impairment)  Goal: Optimal Nutrition Intake  Outcome: Ongoing, Progressing     Problem: Renal Function Impairment (Acute Kidney Injury/Impairment)  Goal: Effective Renal Function  Outcome: Ongoing, Progressing     Problem: Skin Injury Risk Increased  Goal: Skin Health and Integrity  Outcome: Ongoing, Progressing

## 2023-04-11 NOTE — NURSING
Nurses Note -- 4 Eyes      4/10/2023   8:48 PM      Skin assessed during: Q Shift Change       [x]No Pressure Injuries Present    [x]Prevention Measures Documented    No pressure related injury noted but perianal area red and painful. Moisture related. Barrier cream applied      [] Yes- Altered Skin Integrity Present or Discovered   [] LDA Added if Not in Epic (Describe Wound)   [] New Altered Skin Integrity was Present on Admit and Documented in LDA   [] Wound Image Taken    Wound Care Consulted? No    Attending Nurse:  Eusebio Gómez RN     Second RN/Staff Member:  KYRA Flood RN\
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
## 2023-04-11 NOTE — PT/OT/SLP PROGRESS
"Physical Therapy Treatment    Patient Name:  Lorena Contreras   MRN:  [id number]    Recommendations:     Discharge Recommendations: home health PT  Discharge Equipment Recommendations: none  Barriers to discharge: None    Assessment:     Lorena Contreras is a 72 y.o. female admitted with a medical diagnosis of DKA, type 2.  She presents with the following impairments/functional limitations: weakness, impaired endurance, impaired self care skills, impaired functional mobility, gait instability, impaired balance, decreased coordination, decreased lower extremity function, decreased upper extremity function, decreased safety awareness.    Rehab Prognosis: Good; patient would benefit from acute skilled PT services to address these deficits and reach maximum level of function.    Recent Surgery: * No surgery found *      Plan:     During this hospitalization, patient to be seen 5 x/week to address the identified rehab impairments via gait training, therapeutic activities, therapeutic exercises and progress toward the following goals:    Plan of Care Expires:  04/17/23    Subjective     Chief Complaint: "I pee when I stand up."  Patient/Family Comments/goals: pt agreed to therapy. "You're coming to walk me, huh?"  Pain/Comfort:  Pain Rating 1: 0/10      Objective:     Communicated with nurse Beba prior to session.  Patient found HOB elevated with telemetry, peripheral IV, PureWick upon PT entry to room.     General Precautions: Standard, fall  Orthopedic Precautions: N/A  Braces: N/A  Respiratory Status: Room air     Functional Mobility:  Bed Mobility:     Rolling Left:  modified independence  Scooting: modified independence  Supine to Sit: modified independence  Transfers:     Sit to Stand:  supervision with rolling walker  Gait: 50 ft with rw with SBA with decreased janak, decreased step length, 1 LOB with turn with CGA to correct.  Balance: F standing balance      AM-PAC 6 CLICK MOBILITY  Turning over in bed " (including adjusting bedclothes, sheets and blankets)?: 4  Sitting down on and standing up from a chair with arms (e.g., wheelchair, bedside commode, etc.): 4  Moving from lying on back to sitting on the side of the bed?: 4  Moving to and from a bed to a chair (including a wheelchair)?: 4  Need to walk in hospital room?: 3  Climbing 3-5 steps with a railing?: 3  Basic Mobility Total Score: 22       Treatment & Education:  Lower Extremity Exercises.   Patient educated on the purpose of therapeutic exercise.    Patient verbalized acceptance/understanding of instructions, expectations, and limitations(for safety).  Patient performed: 2 sets of 10 reps (each) of B LE There Ex: AP, LAQ, Hip abd/add, Hip flexion while sitting up on EOB.       Patient required still requires verbal cues/tactile cues to ensure correct sequence, to maintain proper form, and to allow for self-correction.  Pt reported no complaints or problems with exercise activity.      Patient left up in chair with all lines intact, call button in reach, and nurse notified..    GOALS:   Multidisciplinary Problems       Physical Therapy Goals          Problem: Physical Therapy    Goal Priority Disciplines Outcome Goal Variances Interventions   Physical Therapy Goal     PT, PT/OT Ongoing, Progressing     Description: Goals to be met by: 23     Patient will increase functional independence with mobility by performin. Pt to be mod I with bed mobility.  2. Pt to transfer with supervision.  3. Pt to ambulate 150' w/RW SBA.  4. Pt to be (I) with written HEP.                         Time Tracking:     PT Received On: 23  PT Start Time: 1443     PT Stop Time: 1512  PT Total Time (min): 29 min     Billable Minutes: Gait Training 8 and Therapeutic Exercise 21    Treatment Type: Treatment  PT/PTA: PTA     Number of PTA visits since last PT visit: 2023

## 2023-04-11 NOTE — NURSING
Nurses Note -- 4 Eyes      4/11/2023   12:28 AM      Skin assessed during: Transfer      [x] No Pressure Injuries Present    [x]Prevention Measures Documented      [] Yes- Altered Skin Integrity Present or Discovered   [] LDA Added if Not in Epic (Describe Wound)   [] New Altered Skin Integrity was Present on Admit and Documented in LDA   [] Wound Image Taken    Wound Care Consulted? No    Attending Nurse:  Eusebio Gómez RN     Second RN/Staff Member:  FRANCIS Lopez

## 2023-04-11 NOTE — PROGRESS NOTES
[facility] - Intensive Care  Neurology  Progress Note    Patient Name: Lorena Contreras  MRN: [id number]  Admission Date: 4/9/2023  Hospital Length of Stay: 1 days  Code Status: Prior   Attending Provider: No att. providers found   Consulting Provider: Shaq Spicer MD  Primary Care Physician: Jorge Paris MD  Principal Problem:DKA, type 2    Consults  Subjective:     Chief Complaint: Facial twitching     HPI:  71 years old female with medical Hx of CAD (s/p stenting in 2019), HFpEF,  HTN, DM, hyperlipidemia, MDD, anxiety, TIA comes to ED due to facial twitching. Four days prior pt states that she began to drool from the right corner of her mouth and noticed difficulty swallowing. Later she had episodes of right facial twitching. Ms. Contreras also reported numbness on right limbs. Pt found to be in DKA admitted to ICU.    -4/11/23: No facial. Twitching episodes today. She tells me that the has bad teeth on the right side of the mouth that keisha attention and have become painful.      Past Medical History:   Diagnosis Date    Allergy     Altered mental status 06/19/2022    DYSARTHRIA, SPASTIC MOVEMENTS & DIFFICULTY SWALLOWING    Anemia     Anxiety     Arthritis     Cataract     both removed    Colon polyps     Coronary artery disease     Depression     Diabetes mellitus, type II     Disorder of kidney and ureter     Fibromyalgia     Follicular lymphoma     GERD (gastroesophageal reflux disease)     HTN (hypertension)     Hyperlipidemia     MI (myocardial infarction) 03/2019    Personal history of colonic polyps     Restless leg syndrome     Stroke        Past Surgical History:   Procedure Laterality Date    COLONOSCOPY  11/07/2012    Colon polyp found; repeat in 5 years    ELBOW SURGERY Right 2015    dislocation repair     ESOPHAGOGASTRODUODENOSCOPY  11/07/2012    atrophic gastritis, H pylori testing negative    INCISION AND DRAINAGE FOOT Right 6/2/2021    Procedure: INCISION AND DRAINAGE, FOOT, bone biopsy;   Surgeon: Quiana Penn DPM;  Location: [facility] OR;  Service: Podiatry;  Laterality: Right;    KNEE SURGERY Bilateral 2015    scoped    LEFT HEART CATHETERIZATION Left 3/29/2019    Procedure: Left heart cath;  Surgeon: Bladimir Barbosa MD;  Location: [facility] CATH LAB;  Service: Cardiology;  Laterality: Left;    LEFT HEART CATHETERIZATION Left 11/18/2019    Procedure: Left heart cath;  Surgeon: Karl Rico MD;  Location: [facility] CATH LAB;  Service: Cardiology;  Laterality: Left;    LEFT HEART CATHETERIZATION Left 1/8/2020    Procedure: Left heart cath, right radial, noon start;  Surgeon: Christos Monreal MD;  Location: [facility] CATH LAB;  Service: Cardiology;  Laterality: Left;  RN Pre Op 1-6-20.  To be admitted 1-7-20 sor Aspirin Disensitation    TONSILLECTOMY  1955    ULTRASOUND GUIDANCE  1/8/2020    Procedure: Ultrasound Guidance;  Surgeon: Christos Monreal MD;  Location: [facility] CATH LAB;  Service: Cardiology;;       Review of patient's allergies indicates:   Allergen Reactions    Novolin 70/30 (semi-synthetic) Nausea And Vomiting     Severe vomiting on Relion 70/30    Shellfish containing products Swelling    Sulfa (sulfonamide antibiotics) Anaphylaxis    Talwin [pentazocine lactate] Anaphylaxis    Victoza [liraglutide] Nausea And Vomiting    Glipizide Nausea Only    Citric acid     Codeine     Influenza virus vaccines Hives    Iodine and iodide containing products Hives    Rituxan [rituximab] Hives    Zoloft [sertraline] Nausea And Vomiting       Current Neurological Medications:     No current facility-administered medications on file prior to encounter.     Current Outpatient Medications on File Prior to Encounter   Medication Sig    amLODIPine (NORVASC) 10 MG tablet TAKE 1 TABLET EVERY DAY    aspirin 81 MG Chew Take 1 tablet (81 mg total) by mouth once daily.    atenoloL (TENORMIN) 50 MG tablet Take 1 tablet (50 mg total) by mouth 2 (two) times daily.    atorvastatin (LIPITOR) 80 MG tablet Take 1 tablet (80 mg total)  by mouth every evening.    FLUoxetine 40 MG capsule Take 1 capsule (40 mg total) by mouth once daily. (Patient taking differently: Take 80 mg by mouth once daily.)    gabapentin (NEURONTIN) 300 MG capsule Take 2 capsules by mouth twice daily    isosorbide mononitrate (IMDUR) 30 MG 24 hr tablet Take 1 tablet (30 mg total) by mouth once daily.    pantoprazole (PROTONIX) 40 MG tablet Take 1 tablet by mouth once daily    ticagrelor (BRILINTA) 90 mg tablet Take 1 tablet (90 mg total) by mouth 2 (two) times daily.    acetaminophen (TYLENOL) 500 MG tablet Take 1 tablet (500 mg total) by mouth every 4 (four) hours as needed for Pain. (Patient taking differently: Take 1,000 mg by mouth every 4 (four) hours as needed for Pain.)    albuterol (PROVENTIL/VENTOLIN HFA) 90 mcg/actuation inhaler Inhale 2 puffs into the lungs every 6 (six) hours as needed for Wheezing or Shortness of Breath.    ASCORBIC ACID, VITAMIN C, ORAL Take by mouth once daily.    Bacillus coagulans (DIGESTIVE ADVANTAGE IMMUNE ORAL) Take by mouth.    blood sugar diagnostic (FREESTYLE TEST) Strp 1 strip by Misc.(Non-Drug; Combo Route) route 4 (four) times daily.    cholecalciferol, vitamin D3, (VITAMIN D3) 50 mcg (2,000 unit) Cap Take 2 capsules by mouth 2 (two) times daily.    cyanocobalamin (VITAMIN B-12) 1000 MCG tablet Take 100 mcg by mouth once daily.    diclofenac sodium (VOLTAREN TOP) Apply topically.    dulaglutide (TRULICITY) 4.5 mg/0.5 mL pen injector Inject 4.5 mg into the skin every 7 days.    EPINEPHrine (EPIPEN) 0.3 mg/0.3 mL AtIn Inject 0.3 mLs (0.3 mg total) into the muscle once. for 1 dose    ESOMEPRAZOLE MAGNESIUM ORAL Take by mouth as needed.    ferrous sulfate 325 (65 FE) MG EC tablet Take 1 tablet (325 mg total) by mouth once daily.    flavoring agent, bulk, (CLOVE FLAVORING) Oil 1 Dose by Misc.(Non-Drug; Combo Route) route every 4 (four) hours as needed (dental pain).    fluticasone propionate (FLONASE) 50 mcg/actuation nasal spray 2  "sprays (100 mcg total) by Each Nostril route once daily.    glimepiride (AMARYL) 2 MG tablet Take 1 tablet (2 mg total) by mouth before breakfast.    insulin aspart U-100 (NOVOLOG FLEXPEN U-100 INSULIN) 100 unit/mL (3 mL) InPn pen Use with sliding scale before meals: 150-200=+2, 201-250=+4; 251-300=+6; 301-350=+8, over 350=+10 units    insulin degludec (TRESIBA FLEXTOUCH U-200) 200 unit/mL (3 mL) insulin pen Inject 22 units once daily and titrate to 40 units    lancets 32 gauge Misc 1 lancet by Misc.(Non-Drug; Combo Route) route 4 (four) times daily.    loratadine (CLARITIN) 10 mg tablet Take 1 tablet (10 mg total) by mouth every morning.    magnesium oxide (MAG-OX) 400 mg tablet Take 400 mg by mouth once daily.    meclizine (ANTIVERT) 25 mg tablet Take 1 tablet (25 mg total) by mouth 3 (three) times daily as needed for Dizziness.    melatonin 10 mg Cap Take 10 mg by mouth nightly.    multivitamin/iron/folic acid (CENTRUM COMPLETE ORAL) Take by mouth.    nitroGLYCERIN (NITROSTAT) 0.4 MG SL tablet PLACE 1 TABLET UNDER THE TONGUE EVERY FIVE MINUTES AS NEEDED FOR CHEST PAIN AS DIRECTED    pen needle, diabetic (BD ULTRA-FINE SAGAR PEN NEEDLE) 32 gauge x 5/32" Ndle USE WITH NOVOLOG MIX FLEXPENS    TRUEPLUS LANCETS 30 gauge Misc     valsartan (DIOVAN) 160 MG tablet Take 1 tablet (160 mg total) by mouth once daily.    vitamin E 1000 UNIT capsule Take 1,000 Units by mouth once daily.      Family History       Problem Relation (Age of Onset)    Allergy (severe) Daughter    Cancer Mother, Father    Diabetes Sister    Heart disease Mother    Hypertension Sister    Lung cancer Brother    No Known Problems Daughter          Tobacco Use    Smoking status: Never    Smokeless tobacco: Never   Substance and Sexual Activity    Alcohol use: Not Currently    Drug use: Never    Sexual activity: Not Currently     Partners: Male     Review of Systems   Constitutional:  Negative for fever.   HENT:  Negative for trouble swallowing.    Eyes:  " Negative for photophobia.   Respiratory:  Negative for shortness of breath.    Cardiovascular:  Negative for chest pain.   Gastrointestinal:  Negative for abdominal pain.   Genitourinary:  Negative for flank pain.   Musculoskeletal:  Negative for back pain.   Neurological:  Negative for headaches.   Psychiatric/Behavioral:  Negative for confusion.        Objective:     Vital Signs (Most Recent):  Temp: 98.2 °F (36.8 °C) (04/11/23 1639)  Pulse: 73 (04/11/23 1639)  Resp: 18 (04/11/23 1639)  BP: (!) 170/70 (04/11/23 1639)  SpO2: 96 % (04/11/23 1639)   Vital Signs (24h Range):  Temp:  [98.2 °F (36.8 °C)] 98.2 °F (36.8 °C)  Pulse:  [73] 73  Resp:  [18] 18  SpO2:  [96 %] 96 %  BP: (170)/(70) 170/70     Weight: 73 kg (160 lb 15 oz)  Body mass index is 25.21 kg/m².    Physical Exam  Constitutional:       General: She is not in acute distress.  HENT:      Head: Normocephalic.   Eyes:      General:         Right eye: No discharge.         Left eye: No discharge.   Cardiovascular:      Rate and Rhythm: Normal rate.   Pulmonary:      Breath sounds: Normal breath sounds.   Abdominal:      Palpations: Abdomen is soft.   Musculoskeletal:         General: No swelling.      Cervical back: Neck supple.   Skin:     Findings: No rash.   Neurological:      Coordination: Finger-Nose-Finger Test and Heel to Shin Test normal.       NEUROLOGICAL EXAMINATION:     CRANIAL NERVES     CN III, IV, VI   Right pupil: Size: 2 mm.   Left pupil: Size: 2 mm.   Nystagmus: none   Conjugate gaze: present    CN V   Right facial sensation deficit: none  Left facial sensation deficit: none    CN VII   Right facial weakness: central  Left facial weakness: none    CN XII   Tongue deviation: none    MOTOR EXAM     Strength   Strength 5/5 except as noted.        Mild right pronator drift     SENSORY EXAM   Right arm light touch: normal  Left arm light touch: normal  Right leg light touch: normal  Left leg light touch: normal    GAIT AND COORDINATION       Coordination   Finger to nose coordination: normal  Heel to shin coordination: normal    Significant Labs: CBC:   Recent Labs   Lab 04/11/23  0403   WBC 8.60   HGB 11.2*   HCT 35.2*          CMP:   Recent Labs   Lab 04/11/23  0404 04/11/23  0807 04/11/23  1158 04/11/23  1600   GLU  --  175* 208* 344*   NA  --  140 137 137   K  --  4.3 4.5 5.1   CL  --  113* 109 108   CO2  --  20* 21* 21*   BUN  --  18 18 17   CREATININE  --  1.1 1.1 1.2   CALCIUM  --  8.3* 8.6* 8.4*   MG 1.8  --   --   --    ANIONGAP  --  7* 7* 8       Urine Studies:   No results for input(s): COLORU, APPEARANCEUA, PHUR, SPECGRAV, PROTEINUA, GLUCUA, KETONESU, BILIRUBINUA, OCCULTUA, NITRITE, UROBILINOGEN, LEUKOCYTESUR, RBCUA, WBCUA, BACTERIA, SQUAMEPITHEL, HYALINECASTS in the last 48 hours.    Invalid input(s): WRIGHTSUR      Significant Imaging: I have reviewed all pertinent imaging results/findings within the past 24 hours.    Head CT  FINDINGS:  There is mild generalized cerebral volume loss.  No evidence of acute/recent major vascular distribution cerebral infarction, intraparenchymal hemorrhage, or intra-axial space occupying lesion. The ventricular system is stable in size and configuration with cavum septum pellucidum noted.  No effacement of the skull-base cisterns. No abnormal extra-axial fluid collections or blood products. Visualized paranasal sinuses and mastoid air cells are clear. The calvarium shows no significant abnormality.     Impression:     No acute intracranial abnormalities identified.        Electronically signed by: Leatha Palomo MD  Date:                                            04/10/2023  Time:                                           00:43    Assessment and Plan:     71 y/o female consulted for possible stroke    Right sided weakness: stroke is a concern as well as focal seizures. On exam mild weakness on right side. No stroke on MRI.  ASA 81 mg daily. Statin  DKA management by primary team.  Seizure: possible  focal seizures.   Levetiracetam 1000 mg IV x 1 given. Will keep pt on levetiracetam 500 mg BID.     Active Diagnoses:    Diagnosis Date Noted POA    Chronic diastolic heart failure [I50.32] 08/10/2021 Yes    Coronary artery disease of native artery of native heart with stable angina pectoris [I25.118] 03/29/2019 Yes    Hyperlipidemia [E78.5] 07/30/2015 Yes    Hypertension associated with diabetes [E11.59, I15.2] 01/24/2014 Yes     Chronic      Problems Resolved During this Admission:    Diagnosis Date Noted Date Resolved POA    PRINCIPAL PROBLEM:  DKA, type 2 [E11.10] 04/10/2023 04/11/2023 Yes    KYA (acute kidney injury) [N17.9] 04/10/2023 04/11/2023 Yes    Seizure-like activity [R56.9] 04/10/2023 04/11/2023 Yes    Stroke-like symptoms [R29.90] 04/10/2023 04/11/2023 Yes    Leukocytosis [D72.829] 04/10/2023 04/11/2023 Yes       VTE Risk Mitigation (From admission, onward)           Ordered     IP VTE HIGH RISK PATIENT  Once         04/10/23 0217                    Thank you for your consult. I will follow-up with patient. Please contact us if you have any additional questions.    Shaq Spicer MD  Neurology  [facility] - Intensive Care

## 2023-04-11 NOTE — PT/OT/SLP PROGRESS
Speech Language Pathology Treatment    Patient Name:  Lorena Contreras   MRN:  [id number]  Admitting Diagnosis: DKA, type 2    Recommendations:                 General Recommendations:  Dysphagia therapy  Diet recommendations:  Minced & Moist Diet - IDDSI Level 5, Liquid Diet Level: Thin liquids - IDDSI Level 0   Aspiration Precautions: 1 bite/sip at a time, Alternating bites/sips, and Small bites/sips   General Precautions: Standard, other (see comments) (mechanical soft)  Communication strategies:  none    Subjective   Pt awake, alert, and oriented talking with sister at bedside upon SLP arrival. Pt stated she has not had any seizures since starting seizure meds. ST noted significantly improved speech intelligibility during conversational exchanges with therapist. Pt stated she does not like pureed food and wants to advance her diet.     Patient goals: advance diet     Pain/Comfort:  Pain Rating 1: 0/10    Respiratory Status: Room air    Objective:     Has the patient been evaluated by SLP for swallowing?   Yes  Keep patient NPO? No   Current Respiratory Status:        Pt accepted PO trials of thin liquids via straw x4oz and soft solids x4 trials self fed. Oral prep was timely with adequate mastication. No overt s/s of aspiration observed across consistencies. No episodes of facial spasms/seizures during ST session.  Pt completed OME IND to improve lip seal. Speech intelligibility is functional (over 90% intelligible) for conversational level speech.    Assessment:     Lorena Contreras is a 72 y.o. female with a dx of DKA, type 2. Pt's speech intelligibility has significantly improved for conversational level speech. Pt's swallowing is WFL for soft solids. Rec: advance diet to mechanical soft. No seizures during this speech session. ST will follow one more session to ensure diet tolerance.    Goals:   Multidisciplinary Problems       SLP Goals          Problem: SLP    Goal Priority Disciplines Outcome   SLP  Goal     SLP Ongoing, Progressing   Description: ST. Pt will tolerate PO diet of puree consistency with thin liquids without overt s/s of aspiration.-d/c goal 23  2. Pt will perform OMEs to improve right facial droop independently.- d/c goal 23  3. Pt will IND utilize speech intelligibility strategies at level of conversation. - goal met 23  4. Pt will tolerate PO diet of mechanical soft consistency with thin liquids without overt s/s of aspiration.                                             Plan:     Patient to be seen:  3 x/week   Plan of Care expires:  23  Plan of Care reviewed with:  patient   SLP Follow-Up:  Yes       Discharge recommendations:      Barriers to Discharge:  None    Time Tracking:     SLP Treatment Date:   23  Speech Start Time:  1034  Speech Stop Time:  1058     Speech Total Time (min):  24 min    Billable Minutes: Treatment Swallowing Dysfunction 15 min and Self Care/Home Management Training 9 min    2023

## 2023-04-11 NOTE — PLAN OF CARE
Problem: SLP  Goal: SLP Goal  Description: ST. Pt will tolerate PO diet of puree consistency with thin liquids without overt s/s of aspiration.-d/c goal 23  2. Pt will perform OMEs to improve right facial droop independently.- d/c goal 23  3. Pt will IND utilize speech intelligibility strategies at level of conversation. - goal met 23  4. Pt will tolerate PO diet of mechanical soft consistency with thin liquids without overt s/s of aspiration.                        Outcome: Ongoing, Progressing   Pt with significant improvement of speech ineligibility and mastication/swallowing since yesterday.  Rec: advance to MS.

## 2023-04-11 NOTE — PLAN OF CARE
"    [facility] [place] [facility]    HOME HEALTH ORDERS  FACE TO FACE ENCOUNTER    Patient Name: Lorena Contreras  YOB: 1950    PCP: Jorge Paris MD   PCP Address: [address] / [place]  PCP Phone Number: [phone]  PCP Fax: [phone]       Encounter Date: 04/11/2023    Admit to Home Health    Diagnoses:  Active Hospital Problems    Diagnosis  POA    Chronic diastolic heart failure [I50.32]  Yes     Noted on echo 11/18/2019:  Grade II (moderate) left ventricular diastolic dysfunction consistent with pseudonormalization         Coronary artery disease of native artery of native heart with stable angina pectoris [I25.118]  Yes     March 29, 2019:  [facility] and PCI of RCA -  "Patient has serial mid 99% eccentric lesions consistent with plaque rupture site and abnormal EKG findings.... We initially pre-dilated with 3.0 balloon.  We placed a 3.0 by 12 mm resolute kenya stent in the midportion of the vessel.  We overlapped that with a 3.5 x 15 mm resolute kenya stent in the more proximal portion mid RCA.  Good result was achieved with MADINA 3 flow through the vessel.  Lad has a mid 90% stenosis and the left circumflex as well as the long 95% lesion as well.   Cardiac catheterization 01/08/2020:  1.  Successful PCI of proximal ramus with drug-eluting stent x1 (2.0 x 6 mm).  IVUS guidance was utilized for this PCI.  Post PCI IVUS demonstrated well-opposed and expanded stent.  MADINA 3 flow pre and post PCI.   2.  Successful PCI of circumflex with drug-eluting stent x1 (2.25 x 22 mm).  MADINA 3 flow pre and post PCI           Hyperlipidemia [E78.5]  Yes    Hypertension associated with diabetes [E11.59, I15.2]  Yes     Chronic      Resolved Hospital Problems    Diagnosis Date Resolved POA    *DKA, type 2 [E11.10] 04/11/2023 Yes    KYA (acute kidney injury) [N17.9] 04/11/2023 Yes    Seizure-like activity [R56.9] 04/11/2023 Yes    Stroke-like symptoms [R29.90] 04/11/2023 Yes    Leukocytosis [D72.829] " 04/11/2023 Yes       Future Appointments   Date Time Provider Department Center   4/20/2023  9:00 AM Fransico Jhaveri NP [facility]      Follow-up Information       Jorge Paris MD Follow up in 1 week(s).    Specialties: Internal Medicine, Pediatrics  Contact information:  [address] [address]  [phone]                               I have seen and examined this patient face to face today. My clinical findings that support the need for the home health skilled services and home bound status are the following:  Weakness/numbness causing balance and gait disturbance due to Infection and Weakness/Debility making it taxing to leave home.    Allergies:  Review of patient's allergies indicates:   Allergen Reactions    Novolin 70/30 (semi-synthetic) Nausea And Vomiting     Severe vomiting on Relion 70/30    Shellfish containing products Swelling    Sulfa (sulfonamide antibiotics) Anaphylaxis    Talwin [pentazocine lactate] Anaphylaxis    Victoza [liraglutide] Nausea And Vomiting    Glipizide Nausea Only    Citric acid     Codeine     Influenza virus vaccines Hives    Iodine and iodide containing products Hives    Rituxan [rituximab] Hives    Zoloft [sertraline] Nausea And Vomiting       Diet: cardiac diet and diabetic diet: 2000 calorie    Activities: activity as tolerated    Nursing:   SN to complete comprehensive assessment including routine vital signs. Instruct on disease process and s/s of complications to report to MD. Review/verify medication list sent home with the patient at time of discharge  and instruct patient/caregiver as needed. Frequency may be adjusted depending on start of care date.    Notify MD if SBP > 160 or < 90; DBP > 90 or < 50; HR > 120 or < 50; Temp > 101      CONSULTS:    Physical Therapy to evaluate and treat. Evaluate for home safety and equipment needs; Establish/upgrade home exercise program. Perform / instruct on therapeutic exercises, gait training,  transfer training, and Range of Motion.  Occupational Therapy to evaluate and treat. Evaluate home environment for safety and equipment needs. Perform/Instruct on transfers, ADL training, ROM, and therapeutic exercises.   to evaluate for community resources/long-range planning.  Aide to provide assistance with personal care, ADLs, and vital signs.    MISCELLANEOUS CARE:  Diabetic Care:   SN to perform and educate Diabetic management with blood glucose monitoring: and Fingerstick blood sugar AC and HS      Medications: Review discharge medications with patient and family and provide education.      Current Discharge Medication List        START taking these medications    Details   cephALEXin (KEFLEX) 500 MG capsule Take 1 capsule (500 mg total) by mouth 4 (four) times daily. for 5 days  Qty: 20 capsule, Refills: 0           CONTINUE these medications which have NOT CHANGED    Details   amLODIPine (NORVASC) 10 MG tablet TAKE 1 TABLET EVERY DAY  Qty: 90 tablet, Refills: 3      aspirin 81 MG Chew Take 1 tablet (81 mg total) by mouth once daily.  Qty: 90 tablet, Refills: 3      atenoloL (TENORMIN) 50 MG tablet Take 1 tablet (50 mg total) by mouth 2 (two) times daily.  Qty: 180 tablet, Refills: 3    Comments: .  Associated Diagnoses: Hypertension associated with diabetes      atorvastatin (LIPITOR) 80 MG tablet Take 1 tablet (80 mg total) by mouth every evening.  Qty: 90 tablet, Refills: 3    Associated Diagnoses: Uncontrolled type 2 diabetes mellitus with hyperglycemia, with long-term current use of insulin; Coronary artery disease of native artery of native heart with stable angina pectoris      FLUoxetine 40 MG capsule Take 1 capsule (40 mg total) by mouth once daily.  Qty: 90 capsule, Refills: 3    Comments: Please delete all prior scripts with same name and strength including on holds.  Associated Diagnoses: Anxiety      gabapentin (NEURONTIN) 300 MG capsule Take 2 capsules by mouth twice daily  Qty:  180 capsule, Refills: 0    Associated Diagnoses: Polyneuropathy      isosorbide mononitrate (IMDUR) 30 MG 24 hr tablet Take 1 tablet (30 mg total) by mouth once daily.  Qty: 90 tablet, Refills: 3    Associated Diagnoses: Coronary artery disease of native artery of native heart with stable angina pectoris      pantoprazole (PROTONIX) 40 MG tablet Take 1 tablet by mouth once daily  Qty: 90 tablet, Refills: 0    Associated Diagnoses: Gastroesophageal reflux disease, unspecified whether esophagitis present      ticagrelor (BRILINTA) 90 mg tablet Take 1 tablet (90 mg total) by mouth 2 (two) times daily.  Qty: 180 tablet, Refills: 3    Associated Diagnoses: Coronary artery disease of native artery of native heart with stable angina pectoris      acetaminophen (TYLENOL) 500 MG tablet Take 1 tablet (500 mg total) by mouth every 4 (four) hours as needed for Pain.  Refills: 0      albuterol (PROVENTIL/VENTOLIN HFA) 90 mcg/actuation inhaler Inhale 2 puffs into the lungs every 6 (six) hours as needed for Wheezing or Shortness of Breath.  Qty: 18 g, Refills: 0    Comments: Dispense with spacer      ASCORBIC ACID, VITAMIN C, ORAL Take by mouth once daily.      Bacillus coagulans (DIGESTIVE ADVANTAGE IMMUNE ORAL) Take by mouth.      blood sugar diagnostic (FREESTYLE TEST) Strp 1 strip by Misc.(Non-Drug; Combo Route) route 4 (four) times daily.  Qty: 400 each, Refills: 4      cholecalciferol, vitamin D3, (VITAMIN D3) 50 mcg (2,000 unit) Cap Take 2 capsules by mouth 2 (two) times daily.      cyanocobalamin (VITAMIN B-12) 1000 MCG tablet Take 100 mcg by mouth once daily.      diclofenac sodium (VOLTAREN TOP) Apply topically.      dulaglutide (TRULICITY) 4.5 mg/0.5 mL pen injector Inject 4.5 mg into the skin every 7 days.  Qty: 4 pen, Refills: 3    Associated Diagnoses: Uncontrolled type 2 diabetes mellitus with hyperosmolar nonketotic hyperglycemia      EPINEPHrine (EPIPEN) 0.3 mg/0.3 mL AtIn Inject 0.3 mLs (0.3 mg total) into the  muscle once. for 1 dose  Qty: 0.3 mL, Refills: 1    Associated Diagnoses: Hives      ESOMEPRAZOLE MAGNESIUM ORAL Take by mouth as needed.      ferrous sulfate 325 (65 FE) MG EC tablet Take 1 tablet (325 mg total) by mouth once daily.  Qty: 30 tablet, Refills: 11      flavoring agent, bulk, (CLOVE FLAVORING) Oil 1 Dose by Misc.(Non-Drug; Combo Route) route every 4 (four) hours as needed (dental pain).  Qty: 3.7 mL, Refills: 0    Comments: Apply to affected area      fluticasone propionate (FLONASE) 50 mcg/actuation nasal spray 2 sprays (100 mcg total) by Each Nostril route once daily.  Qty: 16 g, Refills: 0      glimepiride (AMARYL) 2 MG tablet Take 1 tablet (2 mg total) by mouth before breakfast.  Qty: 90 tablet, Refills: 0    Associated Diagnoses: Uncontrolled type 2 diabetes mellitus with hyperosmolar nonketotic hyperglycemia      insulin aspart U-100 (NOVOLOG FLEXPEN U-100 INSULIN) 100 unit/mL (3 mL) InPn pen Use with sliding scale before meals: 150-200=+2, 201-250=+4; 251-300=+6; 301-350=+8, over 350=+10 units  Qty: 15 mL, Refills: 0    Comments: Max daily dose 30 units per day  Associated Diagnoses: Uncontrolled type 2 diabetes mellitus with hyperosmolar nonketotic hyperglycemia      insulin degludec (TRESIBA FLEXTOUCH U-200) 200 unit/mL (3 mL) insulin pen Inject 22 units once daily and titrate to 40 units  Qty: 3 pen, Refills: 1    Associated Diagnoses: Uncontrolled type 2 diabetes mellitus with hyperosmolar nonketotic hyperglycemia      !! lancets 32 gauge Misc 1 lancet by Misc.(Non-Drug; Combo Route) route 4 (four) times daily.  Qty: 360 each, Refills: 3    Associated Diagnoses: Uncontrolled type 2 diabetes mellitus with hyperglycemia, with long-term current use of insulin      loratadine (CLARITIN) 10 mg tablet Take 1 tablet (10 mg total) by mouth every morning.  Qty: 60 tablet, Refills: 0      magnesium oxide (MAG-OX) 400 mg tablet Take 400 mg by mouth once daily.    Associated Diagnoses: Hypomagnesemia     "  meclizine (ANTIVERT) 25 mg tablet Take 1 tablet (25 mg total) by mouth 3 (three) times daily as needed for Dizziness.  Qty: 20 tablet, Refills: 0    Associated Diagnoses: Dizziness      melatonin 10 mg Cap Take 10 mg by mouth nightly.      multivitamin/iron/folic acid (CENTRUM COMPLETE ORAL) Take by mouth.      nitroGLYCERIN (NITROSTAT) 0.4 MG SL tablet PLACE 1 TABLET UNDER THE TONGUE EVERY FIVE MINUTES AS NEEDED FOR CHEST PAIN AS DIRECTED  Qty: 25 tablet, Refills: 11    Associated Diagnoses: Coronary artery disease of native artery of native heart with stable angina pectoris      pen needle, diabetic (BD ULTRA-FINE SAGAR PEN NEEDLE) 32 gauge x 5/32" Ndle USE WITH NOVOLOG MIX FLEXPENS  Qty: 400 each, Refills: 3    Associated Diagnoses: Diabetic peripheral neuropathy associated with type 2 diabetes mellitus      !! TRUEPLUS LANCETS 30 gauge Misc       valsartan (DIOVAN) 160 MG tablet Take 1 tablet (160 mg total) by mouth once daily.  Qty: 90 tablet, Refills: 3    Comments: .      vitamin E 1000 UNIT capsule Take 1,000 Units by mouth once daily.       !! - Potential duplicate medications found. Please discuss with provider.        STOP taking these medications       hydroCHLOROthiazide (HYDRODIURIL) 25 MG tablet Comments:   Reason for Stopping:         metFORMIN (GLUCOPHAGE) 1000 MG tablet Comments:   Reason for Stopping:               I certify that this patient is confined to her home and needs intermittent skilled nursing care, physical therapy, and occupational therapy.        "

## 2023-04-11 NOTE — HOSPITAL COURSE
73 y/o female presents with apparent dysphagia, facial weakness and possible seizure like activity.  Labs on presentation showed mild DKA and she was initially admitted to ICU on insulin drip.  DKA quickly resolved and transitioned to SQ insulin.  Noted to have UTI and started on IV ABX's.  Concern about possible CVA or seizure and Neurology consulted.  Initially empirically started on Keppra.  EEG showing no seizure activity and Keppra discontinued.  MRI showing no acute abnormalities.  Symptoms possibly related to UTI, although nothing on UCX.  She has remained afebrile and hemodynamically stable.  Symptoms improved from admission.  PT/OT consulted and recommending HH.  Patient will be discharged home with HH to follow up with PCP.

## 2023-04-11 NOTE — DISCHARGE SUMMARY
[facility] Medicine  Discharge Summary      Patient Name: Lorena Contreras  MRN: [id number]  ANDREWS: [id number]  Patient Class: OP- Observation  Admission Date: 2023  Hospital Length of Stay: 1 days  Discharge Date and Time:  2023 2:23 PM  Attending Physician: Mat Garcia MD   Discharging Provider: Mat Garcia MD  Primary Care Provider: Jorge Paris MD    Primary Care Team: Networked reference to record PCT     HPI:   This is a 71 year old female with a PMHx of CAD s/p stenting (2019), HFpEF,  HTN, IDT2DM c/b diabetic retinopathy, HLD, SANTHOSH, GERD, MDD, anxiety, TIA, NHL who presents with seizure-like activity.    The patient was in her usual state of health until about 4 days prior to presentation.  She reports initially developing tooth pain followed by decreased p.o. intake which progressed dysphagia and drooling.  A day prior to presentation, the patient had difficulty with speech and was having several episodes of generalized twitching, more pronounced in her face that lasts less than 30 seconds.  She also developed weakness more pronounced on her right side.  In the ED, the patient endorsed new symptoms of right-sided facial numbness, associated with numbness in her right hand and foot.    In the ED, the patient was tachypneic.  Last were suggestive of DKA (pH:  7.30, HC03: 16.0, A, BG: >500), leukocytosis (12.7), elevated creatinine (2.2 from a baseline of 1.1), elevated troponin (0.028).   Chest x-ray showed no acute abnormality. CT head showed no acute process.  She was given 1 L of NS, 2.1 L of LR, ceftriaxone, Keppra 1.5 g IV, and was started on insulin drip.  The patient was admitted for further management.      * No surgery found *      Hospital Course:   73 y/o female presents with apparent dysphagia, facial weakness and possible seizure like activity.  Labs on presentation showed mild DKA and she was initially admitted to ICU on insulin drip.  DKA quickly  resolved and transitioned to SQ insulin.  Noted to have UTI and started on IV ABX's.  Concern about possible CVA or seizure and Neurology consulted.  Initially empirically started on Keppra.  EEG showing no seizure activity and Keppra discontinued.  MRI showing no acute abnormalities.  Symptoms possibly related to UTI, although nothing on UCX.  She has remained afebrile and hemodynamically stable.  Symptoms improved from admission.  PT/OT consulted and recommending HH.  Patient will be discharged home with HH to follow up with PCP.  Called back from Neurology.  Patient apparently may have had a true seizure towards the end of EEG.  Recommending continuing Keppra.  Will discharge on Keppra 500 mg bid and follow up with Neurology.    Goals of Care Treatment Preferences:  Code Status: DNR      Consults:   Consults (From admission, onward)          Status Ordering Provider     Inpatient consult to Social Work/Case Management  Once        Provider:  (Not yet assigned)    Completed COURTNEY HOLM     Inpatient consult to Neurology  Once        Provider:  Shaq Spicer MD    Completed LILLY NY     Inpatient consult to Registered Dietitian/Nutritionist  Once        Provider:  (Not yet assigned)    Completed LILLY NY            No new Assessment & Plan notes have been filed under this hospital service since the last note was generated.  Service: Hospital Medicine    Final Active Diagnoses:    Diagnosis Date Noted POA    Chronic diastolic heart failure [I50.32] 08/10/2021 Yes    Coronary artery disease of native artery of native heart with stable angina pectoris [I25.118] 03/29/2019 Yes    Hyperlipidemia [E78.5] 07/30/2015 Yes    Hypertension associated with diabetes [E11.59, I15.2] 01/24/2014 Yes     Chronic      Problems Resolved During this Admission:    Diagnosis Date Noted Date Resolved POA    PRINCIPAL PROBLEM:  DKA, type 2 [E11.10] 04/10/2023 04/11/2023 Yes    KYA (acute kidney injury) [N17.9] 04/10/2023  "04/11/2023 Yes    Seizure-like activity [R56.9] 04/10/2023 04/11/2023 Yes    Stroke-like symptoms [R29.90] 04/10/2023 04/11/2023 Yes    Leukocytosis [D72.829] 04/10/2023 04/11/2023 Yes       Discharged Condition: stable    Disposition: Home-Health Care Svc    Follow Up:   Follow-up Information       Jorge Paris MD Follow up in 1 week(s).    Specialties: Internal Medicine, Pediatrics  Contact information:  [address] [address]  [phone]                           Patient Instructions:      HOSPITAL BED FOR HOME USE     Order Specific Question Answer Comments   Type: Semi-electric    Length of need (1-99 months): 99    Height: 5' 7" (1.702 m)    Weight: 73 kg (160 lb 15 oz)    Please check all that apply: Patient requires positioning of the body in ways not feasible in an ordinary bed due to a medical condition which is expected to last at least one month.      Ambulatory referral/consult to [facility]   Standing Status: Future   Referral Priority: Routine Referral Type: Consultation   Referral Reason: Specialty Services Required   Number of Visits Requested: 1     Ambulatory referral/consult to Neurology   Standing Status: Future   Referral Priority: Routine Referral Type: Consultation   Referral Reason: Specialty Services Required   Requested Specialty: Neurology   Number of Visits Requested: 1     Diet diabetic     Diet Cardiac     Notify your health care provider if you experience any of the following:  temperature >100.4     Notify your health care provider if you experience any of the following:  persistent nausea and vomiting or diarrhea     Notify your health care provider if you experience any of the following:  severe uncontrolled pain     Notify your health care provider if you experience any of the following:  difficulty breathing or increased cough     Notify your health care provider if you experience any of the following:  persistent dizziness, light-headedness, or " visual disturbances     Notify your health care provider if you experience any of the following:  increased confusion or weakness     Activity as tolerated       Pending Diagnostic Studies:       Procedure Component Value Units Date/Time    Basic metabolic panel [[id number]]     Order Status: Sent Lab Status: No result     Specimen: Blood            Medications:  Reconciled Home Medications:      Medication List        START taking these medications      cephALEXin 500 MG capsule  Commonly known as: KEFLEX  Take 1 capsule (500 mg total) by mouth 4 (four) times daily. for 5 days     levETIRAcetam 500 MG Tab  Commonly known as: KEPPRA  Take 1 tablet (500 mg total) by mouth 2 (two) times daily.            CONTINUE taking these medications      acetaminophen 500 MG tablet  Commonly known as: TYLENOL  Take 1 tablet (500 mg total) by mouth every 4 (four) hours as needed for Pain.     albuterol 90 mcg/actuation inhaler  Commonly known as: PROVENTIL/VENTOLIN HFA  Inhale 2 puffs into the lungs every 6 (six) hours as needed for Wheezing or Shortness of Breath.     amLODIPine 10 MG tablet  Commonly known as: NORVASC  TAKE 1 TABLET EVERY DAY     ASCORBIC ACID (VITAMIN C) ORAL  Take by mouth once daily.     aspirin 81 MG Chew  Take 1 tablet (81 mg total) by mouth once daily.     atenoloL 50 MG tablet  Commonly known as: TENORMIN  Take 1 tablet (50 mg total) by mouth 2 (two) times daily.     atorvastatin 80 MG tablet  Commonly known as: LIPITOR  Take 1 tablet (80 mg total) by mouth every evening.     blood sugar diagnostic Strp  Commonly known as: FREESTYLE TEST  1 strip by Misc.(Non-Drug; Combo Route) route 4 (four) times daily.     CENTRUM COMPLETE ORAL  Take by mouth.     cholecalciferol (vitamin D3) 50 mcg (2,000 unit) Cap capsule  Commonly known as: VITAMIN D3  Take 2 capsules by mouth 2 (two) times daily.     CLOVE FLAVORING Oil  Generic drug: flavoring agent (bulk)  1 Dose by Misc.(Non-Drug; Combo Route) route every 4  (four) hours as needed (dental pain).     cyanocobalamin 1000 MCG tablet  Commonly known as: VITAMIN B-12  Take 100 mcg by mouth once daily.     DIGESTIVE ADVANTAGE IMMUNE ORAL  Take by mouth.     EPINEPHrine 0.3 mg/0.3 mL Atin  Commonly known as: EPIPEN  Inject 0.3 mLs (0.3 mg total) into the muscle once. for 1 dose     ESOMEPRAZOLE MAGNESIUM ORAL  Take by mouth as needed.     ferrous sulfate 325 (65 FE) MG EC tablet  Take 1 tablet (325 mg total) by mouth once daily.     FLUoxetine 40 MG capsule  Take 1 capsule (40 mg total) by mouth once daily.     fluticasone propionate 50 mcg/actuation nasal spray  Commonly known as: FLONASE  2 sprays (100 mcg total) by Each Nostril route once daily.     gabapentin 300 MG capsule  Commonly known as: NEURONTIN  Take 2 capsules by mouth twice daily     glimepiride 2 MG tablet  Commonly known as: AMARYL  Take 1 tablet (2 mg total) by mouth before breakfast.     insulin aspart U-100 100 unit/mL (3 mL) Inpn pen  Commonly known as: NovoLOG FlexPen U-100 Insulin  Use with sliding scale before meals: 150-200=+2, 201-250=+4; 251-300=+6; 301-350=+8, over 350=+10 units     insulin degludec 200 unit/mL (3 mL) insulin pen  Commonly known as: TRESIBA FLEXTOUCH U-200  Inject 22 units once daily and titrate to 40 units     isosorbide mononitrate 30 MG 24 hr tablet  Commonly known as: IMDUR  Take 1 tablet (30 mg total) by mouth once daily.     * lancets 32 gauge Misc  1 lancet by Misc.(Non-Drug; Combo Route) route 4 (four) times daily.     * TRUEPLUS LANCETS 30 gauge Misc  Generic drug: lancets     loratadine 10 mg tablet  Commonly known as: CLARITIN  Take 1 tablet (10 mg total) by mouth every morning.     magnesium oxide 400 mg (241.3 mg magnesium) tablet  Commonly known as: MAG-OX  Take 400 mg by mouth once daily.     meclizine 25 mg tablet  Commonly known as: ANTIVERT  Take 1 tablet (25 mg total) by mouth 3 (three) times daily as needed for Dizziness.     melatonin 10 mg Cap  Take 10 mg by  "mouth nightly.     nitroGLYCERIN 0.4 MG SL tablet  Commonly known as: NITROSTAT  PLACE 1 TABLET UNDER THE TONGUE EVERY FIVE MINUTES AS NEEDED FOR CHEST PAIN AS DIRECTED     pantoprazole 40 MG tablet  Commonly known as: PROTONIX  Take 1 tablet by mouth once daily     pen needle, diabetic 32 gauge x 5/32" Ndle  Commonly known as: BD ULTRA-FINE SAGAR PEN NEEDLE  USE WITH NOVOLOG MIX FLEXPENS     ticagrelor 90 mg tablet  Commonly known as: BRILINTA  Take 1 tablet (90 mg total) by mouth 2 (two) times daily.     TRULICITY 4.5 mg/0.5 mL pen injector  Generic drug: dulaglutide  Inject 4.5 mg into the skin every 7 days.     valsartan 160 MG tablet  Commonly known as: DIOVAN  Take 1 tablet (160 mg total) by mouth once daily.     vitamin E 1000 UNIT capsule  Take 1,000 Units by mouth once daily.     VOLTAREN TOP  Apply topically.           * This list has 2 medication(s) that are the same as other medications prescribed for you. Read the directions carefully, and ask your doctor or other care provider to review them with you.                STOP taking these medications      hydroCHLOROthiazide 25 MG tablet  Commonly known as: HYDRODIURIL     metFORMIN 1000 MG tablet  Commonly known as: GLUCOPHAGE              Indwelling Lines/Drains at time of discharge:   Lines/Drains/Airways      Time spent on the discharge of patient: >30 minutes         Mat Garcia MD  Department of Hospital Medicine  [facility]  "

## 2023-04-11 NOTE — NURSING
Report called to Jessica on 3rd floor. Pt transferred in bed with 3LNC. Tele monitor applied and in working order. All pts belongings transferred with pt. Oriented to new room. Bed low and locked with call light in reach. New nurse @ BS.

## 2023-04-11 NOTE — NURSING
Patient discharged per MD order.  IV removed.  Catheter tip intact.  No distress noted.  Discharge instructions explained.  AVS given to patient   Patient verbalized understanding.  VSS.  Afebrile.  No complaints of pain, N/V, diarrhea or SOB.  Pt informed where to  Rx .  Patient waiting on pt transport.

## 2023-04-11 NOTE — NURSING
Nurses Note -- 4 Eyes      4/10/2023   8:30  PM      Skin assessed during: Transfer      [x] No Pressure Injuries Present    [x]Prevention Measures Documented    No pressure related injury noted but perianal area red and painful. Moisture related. Barrier cream applied    [] Yes- Altered Skin Integrity Present or Discovered   [] LDA Added if Not in Epic (Describe Wound)   [] New Altered Skin Integrity was Present on Admit and Documented in LDA   [] Wound Image Taken    Wound Care Consulted? No    Attending Nurse:  Eusebio Gómez RN     Second RN/Staff Member:  FRANCIS Heath

## 2023-04-11 NOTE — NURSING
[facility], [facility]  Nurses Note -- 4 Eyes      4/11/2023       Skin assessed on: Q Shift      [x] No Pressure Injuries Present    []Prevention Measures Documented    [] Yes LDA  for Pressure Injury Previously documented     [] Yes New Pressure Injury Discovered   [] LDA for New Pressure Injury Added      Attending RN:  Sedrick Stevenson LPN     Second RN:  Beba Woo LPN

## 2023-04-11 NOTE — PLAN OF CARE
Problem: Physical Therapy  Goal: Physical Therapy Goal  Description: Goals to be met by: 23     Patient will increase functional independence with mobility by performin. Pt to be mod I with bed mobility.  2. Pt to transfer with supervision.  3. Pt to ambulate 150' w/RW SBA.  4. Pt to be (I) with written HEP.    Outcome: Ongoing, Progressing

## 2023-04-11 NOTE — PLAN OF CARE
[place] - Med Surg  Discharge Final Note    All needs are met. Instruction is noted in AVS for patient to call to schedule appointment to be seen by her PCP within 7 days. HH services was recommended by physician. SW sent referral to [facility] via careport per patient's choice. SW notified nurse Beba that patient is clear for discharge from case management standpoint.     Primary Care Provider: Jorge Paris MD    Expected Discharge Date: 4/11/2023    Final Discharge Note (most recent)       Final Note - 04/11/23 1729          Final Note    Assessment Type Final Discharge Note     Anticipated Discharge Disposition Home or Self Care                     Important Message from Medicare             Contact Info       Jorge Paris MD   Specialty: Internal Medicine, Pediatrics   Relationship: PCP - General    [address]  [place] [address]   Phone: [phone]       Next Steps: Follow up in 1 week(s)    [facility] - [address]  [place] [address]   Phone: [phone]       Next Steps: Follow up

## 2023-04-12 ENCOUNTER — PATIENT MESSAGE (OUTPATIENT)
Dept: ADMINISTRATIVE | Facility: HOSPITAL | Age: 73
End: 2023-04-12
Payer: MEDICARE

## 2023-04-12 LAB — BACTERIA UR CULT: NORMAL

## 2023-04-14 LAB
BACTERIA BLD CULT: NORMAL
BACTERIA BLD CULT: NORMAL

## 2023-04-17 ENCOUNTER — TELEPHONE (OUTPATIENT)
Dept: FAMILY MEDICINE | Facility: CLINIC | Age: 73
End: 2023-04-17
Payer: MEDICARE

## 2023-04-17 ENCOUNTER — NURSE TRIAGE (OUTPATIENT)
Dept: ADMINISTRATIVE | Facility: CLINIC | Age: 73
End: 2023-04-17
Payer: MEDICARE

## 2023-04-17 NOTE — TELEPHONE ENCOUNTER
"Patient discharged from the hospital on 4/11/23 after seizure like activity. She was started on Keppra at discharge as well as a course of Keflex. She completed the Keflex yesterday. She states she started experiencing widespread itching yesterday that won't go away. She says the itching is from "head to toe:" eyes, ears, throat. She has taken benadryl and gotten some relief; last dose at 12 pm today. She has not taken the keppra today. Denies rash and difficulty breathing. Care advice is to be seen in the office today; no appts available. Advised to discuss with PCP and have nurse callback patient within an hour. Will route urgent message to staff.     Reason for Disposition   MODERATE-SEVERE widespread itching (i.e., interferes with sleep, normal activities or school) and not improved after 24 hours of itching Care Advice    Additional Information   Negative: Life-threatening reaction (anaphylaxis) in the past to similar substance (e.g., food, insect bite/sting, chemical, etc.) and < 2 hours since exposure   Negative: Difficulty breathing or wheezing   Negative: Difficulty swallowing or slurred speech and sudden onset   Negative: Sounds like a life-threatening emergency to the triager   Negative: Patient sounds very sick or weak to the triager    Protocols used: Itching - Widespread-A-OH    "

## 2023-04-18 NOTE — TELEPHONE ENCOUNTER
Call was placed to patient to forward MD message. Voicemail was left at home number for return call to clinic. Voicemail on cell number not set-up. Awaiting call back.

## 2023-04-18 NOTE — TELEPHONE ENCOUNTER
Reviewed patient's query - itching/pruritus not a side effect of Keppra. Unclear if patient referring to vaginal itching or discharge (I.e, possible yeast infection in the setting of recent antibiotic usage). Recommend addressing at upcoming visit

## 2023-04-19 DIAGNOSIS — E11.9 TYPE 2 DIABETES MELLITUS WITHOUT COMPLICATION: ICD-10-CM

## 2023-04-20 ENCOUNTER — OFFICE VISIT (OUTPATIENT)
Dept: FAMILY MEDICINE | Facility: CLINIC | Age: 73
End: 2023-04-20
Payer: MEDICARE

## 2023-04-20 ENCOUNTER — TELEPHONE (OUTPATIENT)
Dept: NEUROLOGY | Facility: CLINIC | Age: 73
End: 2023-04-20
Payer: MEDICARE

## 2023-04-20 ENCOUNTER — PATIENT MESSAGE (OUTPATIENT)
Dept: BEHAVIORAL HEALTH | Facility: CLINIC | Age: 73
End: 2023-04-20
Payer: MEDICARE

## 2023-04-20 VITALS
HEART RATE: 66 BPM | SYSTOLIC BLOOD PRESSURE: 135 MMHG | BODY MASS INDEX: 25.95 KG/M2 | DIASTOLIC BLOOD PRESSURE: 60 MMHG | WEIGHT: 165.69 LBS | TEMPERATURE: 98 F | OXYGEN SATURATION: 97 %

## 2023-04-20 DIAGNOSIS — I15.2 HYPERTENSION ASSOCIATED WITH DIABETES: Chronic | ICD-10-CM

## 2023-04-20 DIAGNOSIS — L97.509 NON-PRESSURE CHRONIC ULCER OF OTHER PART OF UNSPECIFIED FOOT WITH UNSPECIFIED SEVERITY: ICD-10-CM

## 2023-04-20 DIAGNOSIS — E11.59 HYPERTENSION ASSOCIATED WITH DIABETES: Chronic | ICD-10-CM

## 2023-04-20 DIAGNOSIS — Z12.11 COLON CANCER SCREENING: ICD-10-CM

## 2023-04-20 DIAGNOSIS — Z09 HOSPITAL DISCHARGE FOLLOW-UP: Primary | ICD-10-CM

## 2023-04-20 DIAGNOSIS — E11.3592 PROLIFERATIVE DIABETIC RETINOPATHY OF LEFT EYE ASSOCIATED WITH TYPE 2 DIABETES MELLITUS, UNSPECIFIED PROLIFERATIVE RETINOPATHY TYPE: ICD-10-CM

## 2023-04-20 DIAGNOSIS — F41.9 ANXIETY: ICD-10-CM

## 2023-04-20 DIAGNOSIS — E78.5 HYPERLIPIDEMIA, UNSPECIFIED HYPERLIPIDEMIA TYPE: ICD-10-CM

## 2023-04-20 DIAGNOSIS — C82.90 FOLLICULAR LYMPHOMA, UNSPECIFIED FOLLICULAR LYMPHOMA TYPE, UNSPECIFIED BODY REGION: ICD-10-CM

## 2023-04-20 DIAGNOSIS — Z12.31 ENCOUNTER FOR SCREENING MAMMOGRAM FOR BREAST CANCER: ICD-10-CM

## 2023-04-20 DIAGNOSIS — F33.1 MODERATE EPISODE OF RECURRENT MAJOR DEPRESSIVE DISORDER: ICD-10-CM

## 2023-04-20 DIAGNOSIS — R56.9 SEIZURE-LIKE ACTIVITY: ICD-10-CM

## 2023-04-20 DIAGNOSIS — E11.00 UNCONTROLLED TYPE 2 DIABETES MELLITUS WITH HYPEROSMOLAR NONKETOTIC HYPERGLYCEMIA: ICD-10-CM

## 2023-04-20 PROCEDURE — 1101F PR PT FALLS ASSESS DOC 0-1 FALLS W/OUT INJ PAST YR: ICD-10-PCS | Mod: HCNC,CPTII,S$GLB, | Performed by: NURSE PRACTITIONER

## 2023-04-20 PROCEDURE — 3288F PR FALLS RISK ASSESSMENT DOCUMENTED: ICD-10-PCS | Mod: HCNC,CPTII,S$GLB, | Performed by: NURSE PRACTITIONER

## 2023-04-20 PROCEDURE — 3078F DIAST BP <80 MM HG: CPT | Mod: HCNC,CPTII,S$GLB, | Performed by: NURSE PRACTITIONER

## 2023-04-20 PROCEDURE — 3008F BODY MASS INDEX DOCD: CPT | Mod: HCNC,CPTII,S$GLB, | Performed by: NURSE PRACTITIONER

## 2023-04-20 PROCEDURE — 1126F PR PAIN SEVERITY QUANTIFIED, NO PAIN PRESENT: ICD-10-PCS | Mod: HCNC,CPTII,S$GLB, | Performed by: NURSE PRACTITIONER

## 2023-04-20 PROCEDURE — 3075F SYST BP GE 130 - 139MM HG: CPT | Mod: HCNC,CPTII,S$GLB, | Performed by: NURSE PRACTITIONER

## 2023-04-20 PROCEDURE — 4010F ACE/ARB THERAPY RXD/TAKEN: CPT | Mod: HCNC,CPTII,S$GLB, | Performed by: NURSE PRACTITIONER

## 2023-04-20 PROCEDURE — 1101F PT FALLS ASSESS-DOCD LE1/YR: CPT | Mod: HCNC,CPTII,S$GLB, | Performed by: NURSE PRACTITIONER

## 2023-04-20 PROCEDURE — 99214 PR OFFICE/OUTPT VISIT, EST, LEVL IV, 30-39 MIN: ICD-10-PCS | Mod: HCNC,S$GLB,, | Performed by: NURSE PRACTITIONER

## 2023-04-20 PROCEDURE — 4010F PR ACE/ARB THEARPY RXD/TAKEN: ICD-10-PCS | Mod: HCNC,CPTII,S$GLB, | Performed by: NURSE PRACTITIONER

## 2023-04-20 PROCEDURE — 3046F HEMOGLOBIN A1C LEVEL >9.0%: CPT | Mod: HCNC,CPTII,S$GLB, | Performed by: NURSE PRACTITIONER

## 2023-04-20 PROCEDURE — 1126F AMNT PAIN NOTED NONE PRSNT: CPT | Mod: HCNC,CPTII,S$GLB, | Performed by: NURSE PRACTITIONER

## 2023-04-20 PROCEDURE — 3078F PR MOST RECENT DIASTOLIC BLOOD PRESSURE < 80 MM HG: ICD-10-PCS | Mod: HCNC,CPTII,S$GLB, | Performed by: NURSE PRACTITIONER

## 2023-04-20 PROCEDURE — 3075F PR MOST RECENT SYSTOLIC BLOOD PRESS GE 130-139MM HG: ICD-10-PCS | Mod: HCNC,CPTII,S$GLB, | Performed by: NURSE PRACTITIONER

## 2023-04-20 PROCEDURE — 1111F DSCHRG MED/CURRENT MED MERGE: CPT | Mod: HCNC,CPTII,S$GLB, | Performed by: NURSE PRACTITIONER

## 2023-04-20 PROCEDURE — 1159F PR MEDICATION LIST DOCUMENTED IN MEDICAL RECORD: ICD-10-PCS | Mod: HCNC,CPTII,S$GLB, | Performed by: NURSE PRACTITIONER

## 2023-04-20 PROCEDURE — 99999 PR PBB SHADOW E&M-EST. PATIENT-LVL V: CPT | Mod: PBBFAC,HCNC,, | Performed by: NURSE PRACTITIONER

## 2023-04-20 PROCEDURE — 1111F PR DISCHARGE MEDS RECONCILED W/ CURRENT OUTPATIENT MED LIST: ICD-10-PCS | Mod: HCNC,CPTII,S$GLB, | Performed by: NURSE PRACTITIONER

## 2023-04-20 PROCEDURE — 1159F MED LIST DOCD IN RCRD: CPT | Mod: HCNC,CPTII,S$GLB, | Performed by: NURSE PRACTITIONER

## 2023-04-20 PROCEDURE — 99999 PR PBB SHADOW E&M-EST. PATIENT-LVL V: ICD-10-PCS | Mod: PBBFAC,HCNC,, | Performed by: NURSE PRACTITIONER

## 2023-04-20 PROCEDURE — 3288F FALL RISK ASSESSMENT DOCD: CPT | Mod: HCNC,CPTII,S$GLB, | Performed by: NURSE PRACTITIONER

## 2023-04-20 PROCEDURE — 3008F PR BODY MASS INDEX (BMI) DOCUMENTED: ICD-10-PCS | Mod: HCNC,CPTII,S$GLB, | Performed by: NURSE PRACTITIONER

## 2023-04-20 PROCEDURE — 99214 OFFICE O/P EST MOD 30 MIN: CPT | Mod: HCNC,S$GLB,, | Performed by: NURSE PRACTITIONER

## 2023-04-20 PROCEDURE — 3046F PR MOST RECENT HEMOGLOBIN A1C LEVEL > 9.0%: ICD-10-PCS | Mod: HCNC,CPTII,S$GLB, | Performed by: NURSE PRACTITIONER

## 2023-04-20 NOTE — TELEPHONE ENCOUNTER
----- Message from Julianne Marcelo PA-C sent at 4/20/2023 12:55 PM CDT -----  Regarding: FW: Pt Advice / Missed Call  Contact: [phone]    ----- Message -----  From: Thu Olivier  Sent: 4/20/2023  12:04 PM CDT  To: Davian Whitaker Staff  Subject: Pt Advice / Missed Call                          Pt is returning missed call from provider clinic. Please Call

## 2023-04-20 NOTE — PATIENT INSTRUCTIONS
Medical Fitness--[phone]  Imaging, Xray, CT, MRI, Ultrasound---[phone]  Bariatrics---[phone]  Breast Surgery---[phone]  Case Management---[phone]  Colonoscopy---[phone]  DME---[phone]  Infectious Disease---[phone]  Interventional Radiology---[phone]  Medical Records---[phone]  [facility]---[phone]  Optometry/Ophthalmology---[phone]  [facility]---[phone]  Physical Therapy---[phone]  Psychiatry---[phone] or [phone]  Plastic Surgery---[phone]  Recovery--[phone] option 2, or [phone].  Sleep Study---[phone]  Smoking Cessation---[phone]  Wound Care---[phone]  Referral Desk---[phone]

## 2023-04-20 NOTE — PROGRESS NOTES
HPI     Chief Complaint:  Chief Complaint   Patient presents with    Follow-up    Itching       Lorena Contreras is a 72 y.o. female with multiple medical diagnoses as listed in the medical history and problem list that presents for hospital follow up.  Pt is new to me but is known to this clinic with her last appointment being 2/15/2023.          Recent hospital encounter. See below encounter note from 2023.    HPI:   This is a 71 year old female with a PMHx of CAD s/p stenting (2019), HFpEF,  HTN, IDT2DM c/b diabetic retinopathy, HLD, SANTHOSH, GERD, MDD, anxiety, TIA, NHL who presents with seizure-like activity.    The patient was in her usual state of health until about 4 days prior to presentation.  She reports initially developing tooth pain followed by decreased p.o. intake which progressed dysphagia and drooling.  A day prior to presentation, the patient had difficulty with speech and was having several episodes of generalized twitching, more pronounced in her face that lasts less than 30 seconds.  She also developed weakness more pronounced on her right side.  In the ED, the patient endorsed new symptoms of right-sided facial numbness, associated with numbness in her right hand and foot.     In the ED, the patient was tachypneic.  Last were suggestive of DKA (pH:  7.30, HC03: 16.0, A, BG: >500), leukocytosis (12.7), elevated creatinine (2.2 from a baseline of 1.1), elevated troponin (0.028).   Chest x-ray showed no acute abnormality. CT head showed no acute process.  She was given 1 L of NS, 2.1 L of LR, ceftriaxone, Keppra 1.5 g IV, and was started on insulin drip.  The patient was admitted for further management.        * No surgery found *       Hospital Course:   71 y/o female presents with apparent dysphagia, facial weakness and possible seizure like activity.  Labs on presentation showed mild DKA and she was initially admitted to ICU on insulin drip.  DKA quickly resolved and transitioned  to SQ insulin.  Noted to have UTI and started on IV ABX's.  Concern about possible CVA or seizure and Neurology consulted.  Initially empirically started on Keppra.  EEG showing no seizure activity and Keppra discontinued.  MRI showing no acute abnormalities.  Symptoms possibly related to UTI, although nothing on UCX.  She has remained afebrile and hemodynamically stable.  Symptoms improved from admission.  PT/OT consulted and recommending HH.  Patient will be discharged home with HH to follow up with PCP.  Called back from Neurology.  Patient apparently may have had a true seizure towards the end of EEG.  Recommending continuing Keppra.  Will discharge on Keppra 500 mg bid and follow up with Neurology.     Goals of Care Treatment Preferences:  Code Status: DNR     START taking these medications       cephALEXin 500 MG capsule  Commonly known as: KEFLEX  Take 1 capsule (500 mg total) by mouth 4 (four) times daily. for 5 days      levETIRAcetam 500 MG Tab  Commonly known as: KEPPRA  Take 1 tablet (500 mg total) by mouth 2 (two) times daily.       Assessment & Plan     Problem List Items Addressed This Visit          Psychiatric    Anxiety (Chronic)    Encouraged the patient to perform self-calming techniques, such as deep breathing/relaxation techniques and exercise.      Relevant Orders    Ambulatory referral/consult to Primary Care Behavioral Health (Non-Opioids)    Ambulatory referral/consult to the [place] [facility]    Moderate episode of recurrent major depressive disorder    Reports mood is stable. Denies thoughts of self harm.      Overview     Stable at this time, other acute issues addressed              Ophtho    Proliferative diabetic retinopathy of left eye associated with type 2 diabetes mellitus, unspecified proliferative retinopathy type    The current medical regimen is effective;  continue present plan         Cardiac/Vascular    Hypertension associated with diabetes (Chronic)    BP Readings  from Last 3 Encounters:   04/20/23 135/60   04/11/23 (!) 170/70   02/15/23 (!) 162/70       -continue current medication regimen  -DASH diet, regular cardiovascular exercises, portion control  - ?weight loss  -f/u with BP logs in 2 weeks     Enrolled in digital medicine program for this diagnosis      Relevant Orders    Hypertension Digital Medicine (HDMP) Enrollment Order (Completed)    Hyperlipidemia    discussed ways to lower triglycerides such as cutting simple sugars out of diet (white breads, candies, cookies, cakes, etc.) and reducing/eliminating intake of highly processed trans fatty acids.   Exercise 30 minutes a day for 4-5 days a week.   Eat more fiber.    Enrolled in digital medicine program for this diagnosis      Relevant Orders    Lipids Digital Medicine (LDMP) Enrollment Order (Completed)    Lipids Digital Medicine (LDMP): Assign Onboarding Questionnaires (Completed)       Oncology    Follicular lymphoma    The current medical regimen is effective;  continue present plan      Overview       follicular lymphoma diagnosed in late 2009. She was treated with chemotherapy (withour Rituximab due to allergy, unclear regimen) and apparently achieved a CR. She was then lost to follow up die to lack of insurance until 6/26/13 when she re-establish care in our clinic. Surveillance CTs from 7/9/13 are significant for unspecific some tissue prominence in the ventral tongue that is unspecific, unspecific pulmonary nodules and non bulky LAD in the internal jugular chain measuring up to 14 mm. These finding are unspecific and not indicative of follicular lymphoma recurrence.              Endocrine    Uncontrolled type 2 diabetes mellitus with hyperosmolar nonketotic hyperglycemia    -discussed with patient about routine diabetic care that includes but are not limited to regular eye exams, skin care, daily foot exam, proper nutrition, regular BG monitoring at home (fasting and mealtime) and medication compliance in  a diabetic.  Target morning BS  and meal-time BS <180    Enrolled in digital medicine program for this diagnosis      Relevant Orders    Diabetes Digital Medicine (DDMP) Enrollment Order (Completed)    Diabetes Digital Medicine (DDMP): Assign Onboarding Questionnaires (Completed)     Other Visit Diagnoses       Hospital discharge follow-up    -  Primary    Hospital encounter notes, objective/subjective data, diagnostics, and plan of care from 4/11 reviewed.    Reports symptoms have improved since d/c.     Denies fever, dysuria    Denies falls.    She has completed Keflex.    Pt declined HH, PT/OT    Continue Keppra     Follow up with neurology on 4/24/23.    Discussed condition, and treatment.   Education sent to patient portal/included in after visit summary.  ED precautions given.   Notify provider if symptoms do not resolve or increase in severity.   Patient verbalizes understanding and agrees with plan of care.    Non-pressure chronic ulcer of other part of unspecified foot with unspecified severity        The current medical regimen is effective;  continue present plan      Seizure-like activity        Denies seizure activity since hospital d/c.     As above.    The current medical regimen is effective;  continue present plan      Colon cancer screening        Relevant Orders    Ambulatory referral/consult to Endo Procedure     Encounter for screening mammogram for breast cancer        Relevant Orders    Mammo Digital Screening Bilat              --------------------------------------------      Health Maintenance:  Health Maintenance         Date Due Completion Date    COVID-19 Vaccine (1) Never done ---    Shingles Vaccine (1 of 2) Never done ---    Colorectal Cancer Screening Never done ---    DEXA Scan 12/08/2018 12/8/2016    Mammogram 03/26/2019 3/26/2018    Eye Exam 03/12/2022 3/12/2021    Diabetes Urine Screening 02/11/2023 2/11/2022    Influenza Vaccine (1) 06/30/2023 (Originally  9/1/2022) ---    Hemoglobin A1c 07/10/2023 4/10/2023    Foot Exam 10/20/2023 10/20/2022    Lipid Panel 04/10/2024 4/10/2023    TETANUS VACCINE 05/28/2031 5/28/2021            Colon cancer screening ordered and Mammogram ordered. Vaccines offered patient declined at this time.    Follow Up:  Follow up in about 2 weeks (around 5/4/2023), or if symptoms worsen or fail to improve.    Exam     Review of Systems:  (as noted above)  Review of Systems   Constitutional:  Negative for fever.   HENT:  Negative for trouble swallowing.    Eyes:  Negative for visual disturbance.   Respiratory:  Negative for chest tightness and shortness of breath.    Cardiovascular:  Negative for chest pain.   Gastrointestinal:  Negative for blood in stool.   Neurological:  Positive for tremors.     Physical Exam:   Physical Exam  Constitutional:       General: She is not in acute distress.     Appearance: She is not ill-appearing or diaphoretic.   HENT:      Head: Normocephalic and atraumatic.   Cardiovascular:      Rate and Rhythm: Normal rate and regular rhythm.      Heart sounds: No murmur heard.    No friction rub. No gallop.   Pulmonary:      Effort: No respiratory distress.   Chest:      Chest wall: No tenderness.   Musculoskeletal:      Cervical back: No rigidity.   Skin:     Capillary Refill: Capillary refill takes 2 to 3 seconds.   Neurological:      General: No focal deficit present.      Mental Status: She is alert and oriented to person, place, and time.     Vitals:    04/20/23 0904 04/20/23 0921   BP: (!) 140/64 135/60   BP Location: Left arm    Patient Position: Sitting    BP Method: Medium (Manual)    Pulse: 66    Temp: 98.1 °F (36.7 °C)    TempSrc: Oral    SpO2: 97%    Weight: 75.1 kg (165 lb 10.8 oz)       Body mass index is 25.95 kg/m².        History     Past Medical History:  Past Medical History:   Diagnosis Date    Allergy     Altered mental status 06/19/2022    DYSARTHRIA, SPASTIC MOVEMENTS & DIFFICULTY SWALLOWING     Anemia     Anxiety     Arthritis     Cataract     both removed    Colon polyps     Coronary artery disease     Depression     Diabetes mellitus, type II     Disorder of kidney and ureter     Fibromyalgia     Follicular lymphoma     GERD (gastroesophageal reflux disease)     HTN (hypertension)     Hyperlipidemia     MI (myocardial infarction) 03/2019    Personal history of colonic polyps     Restless leg syndrome     Stroke        Past Surgical History:  Past Surgical History:   Procedure Laterality Date    COLONOSCOPY  11/07/2012    Colon polyp found; repeat in 5 years    ELBOW SURGERY Right 2015    dislocation repair     ESOPHAGOGASTRODUODENOSCOPY  11/07/2012    atrophic gastritis, H pylori testing negative    INCISION AND DRAINAGE FOOT Right 6/2/2021    Procedure: INCISION AND DRAINAGE, FOOT, bone biopsy;  Surgeon: Quiana Penn DPM;  Location: [facility] OR;  Service: Podiatry;  Laterality: Right;    KNEE SURGERY Bilateral 2015    scoped    LEFT HEART CATHETERIZATION Left 3/29/2019    Procedure: Left heart cath;  Surgeon: Bladimir Barbosa MD;  Location: [facility] CATH LAB;  Service: Cardiology;  Laterality: Left;    LEFT HEART CATHETERIZATION Left 11/18/2019    Procedure: Left heart cath;  Surgeon: Karl Rico MD;  Location: [facility] CATH LAB;  Service: Cardiology;  Laterality: Left;    LEFT HEART CATHETERIZATION Left 1/8/2020    Procedure: Left heart cath, right radial, noon start;  Surgeon: Christos Monreal MD;  Location: [facility] CATH LAB;  Service: Cardiology;  Laterality: Left;  RN Pre Op 1-6-20.  To be admitted 1-7-20 sor Aspirin Disensitation    TONSILLECTOMY  1955    ULTRASOUND GUIDANCE  1/8/2020    Procedure: Ultrasound Guidance;  Surgeon: Christos Monreal MD;  Location: [facility] CATH LAB;  Service: Cardiology;;       Social History:  Social History     Socioeconomic History    Marital status:     Number of children: 2   Occupational History    Occupation: house wife    Occupation: [employer]   Tobacco  Use    Smoking status: Never    Smokeless tobacco: Never   Substance and Sexual Activity    Alcohol use: Not Currently    Drug use: Never    Sexual activity: Not Currently     Partners: Male   Social History Narrative     2021.  2 dtr.  Lives with GS.  3 cats and a dog.  Retired.  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  Lives in house, 1 story and 4 steps up and has a ramp.      Enjoys crafting.  Unable to bowl due to myalgias.         Family History:  Family History   Problem Relation Age of Onset    Cancer Mother         colon    Heart disease Mother     Cancer Father         lung    Lung cancer Brother     Diabetes Sister     Hypertension Sister     Allergy (severe) Daughter     No Known Problems Daughter     Stroke Neg Hx     Hyperlipidemia Neg Hx        Allergies and Medications: (updated and reviewed)  Review of patient's allergies indicates:   Allergen Reactions    Novolin 70/30 (semi-synthetic) Nausea And Vomiting     Severe vomiting on Relion 70/30    Shellfish containing products Swelling    Sulfa (sulfonamide antibiotics) Anaphylaxis    Talwin [pentazocine lactate] Anaphylaxis    Victoza [liraglutide] Nausea And Vomiting    Glipizide Nausea Only    Citric acid     Codeine     Influenza virus vaccines Hives    Iodine and iodide containing products Hives    Rituxan [rituximab] Hives    Zoloft [sertraline] Nausea And Vomiting     Current Outpatient Medications   Medication Sig Dispense Refill    acetaminophen (TYLENOL) 500 MG tablet Take 1 tablet (500 mg total) by mouth every 4 (four) hours as needed for Pain. (Patient taking differently: Take 1,000 mg by mouth every 4 (four) hours as needed for Pain.)  0    albuterol (PROVENTIL/VENTOLIN HFA) 90 mcg/actuation inhaler Inhale 2 puffs into the lungs every 6 (six) hours as needed for Wheezing or Shortness of Breath. 18 g 0    amLODIPine (NORVASC) 10 MG tablet TAKE 1 TABLET EVERY DAY 90 tablet 3    ASCORBIC ACID, VITAMIN C, ORAL Take by mouth  once daily.      atenoloL (TENORMIN) 50 MG tablet Take 1 tablet (50 mg total) by mouth 2 (two) times daily. 180 tablet 3    atorvastatin (LIPITOR) 80 MG tablet Take 1 tablet (80 mg total) by mouth every evening. 90 tablet 3    Bacillus coagulans (DIGESTIVE ADVANTAGE IMMUNE ORAL) Take by mouth.      blood sugar diagnostic (FREESTYLE TEST) Strp 1 strip by Misc.(Non-Drug; Combo Route) route 4 (four) times daily. 400 each 4    cholecalciferol, vitamin D3, (VITAMIN D3) 50 mcg (2,000 unit) Cap Take 2 capsules by mouth 2 (two) times daily.      cyanocobalamin (VITAMIN B-12) 1000 MCG tablet Take 100 mcg by mouth once daily.      diclofenac sodium (VOLTAREN TOP) Apply topically.      dulaglutide (TRULICITY) 4.5 mg/0.5 mL pen injector Inject 4.5 mg into the skin every 7 days. 4 pen 3    ESOMEPRAZOLE MAGNESIUM ORAL Take by mouth as needed.      ferrous sulfate 325 (65 FE) MG EC tablet Take 1 tablet (325 mg total) by mouth once daily. 30 tablet 11    flavoring agent, bulk, (CLOVE FLAVORING) Oil 1 Dose by Misc.(Non-Drug; Combo Route) route every 4 (four) hours as needed (dental pain). 3.7 mL 0    FLUoxetine 40 MG capsule Take 1 capsule (40 mg total) by mouth once daily. (Patient taking differently: Take 80 mg by mouth once daily.) 90 capsule 3    fluticasone propionate (FLONASE) 50 mcg/actuation nasal spray 2 sprays (100 mcg total) by Each Nostril route once daily. 16 g 0    gabapentin (NEURONTIN) 300 MG capsule Take 2 capsules by mouth twice daily 180 capsule 0    glimepiride (AMARYL) 2 MG tablet Take 1 tablet (2 mg total) by mouth before breakfast. 90 tablet 0    insulin aspart U-100 (NOVOLOG FLEXPEN U-100 INSULIN) 100 unit/mL (3 mL) InPn pen Use with sliding scale before meals: 150-200=+2, 201-250=+4; 251-300=+6; 301-350=+8, over 350=+10 units 15 mL 0    insulin degludec (TRESIBA FLEXTOUCH U-200) 200 unit/mL (3 mL) insulin pen Inject 22 units once daily and titrate to 40 units 3 pen 1    isosorbide mononitrate (IMDUR) 30 MG  "24 hr tablet Take 1 tablet (30 mg total) by mouth once daily. 90 tablet 3    lancets 32 gauge Misc 1 lancet by Misc.(Non-Drug; Combo Route) route 4 (four) times daily. 360 each 3    levETIRAcetam (KEPPRA) 500 MG Tab Take 1 tablet (500 mg total) by mouth 2 (two) times daily. 60 tablet 11    loratadine (CLARITIN) 10 mg tablet Take 1 tablet (10 mg total) by mouth every morning. 60 tablet 0    magnesium oxide (MAG-OX) 400 mg tablet Take 400 mg by mouth once daily.      meclizine (ANTIVERT) 25 mg tablet Take 1 tablet (25 mg total) by mouth 3 (three) times daily as needed for Dizziness. 20 tablet 0    melatonin 10 mg Cap Take 10 mg by mouth nightly.      multivitamin/iron/folic acid (CENTRUM COMPLETE ORAL) Take by mouth.      nitroGLYCERIN (NITROSTAT) 0.4 MG SL tablet PLACE 1 TABLET UNDER THE TONGUE EVERY FIVE MINUTES AS NEEDED FOR CHEST PAIN AS DIRECTED 25 tablet 11    pantoprazole (PROTONIX) 40 MG tablet Take 1 tablet by mouth once daily 90 tablet 0    pen needle, diabetic (BD ULTRA-FINE SAGAR PEN NEEDLE) 32 gauge x 5/32" Ndle USE WITH NOVOLOG MIX FLEXPENS 400 each 3    ticagrelor (BRILINTA) 90 mg tablet Take 1 tablet (90 mg total) by mouth 2 (two) times daily. 180 tablet 3    TRUEPLUS LANCETS 30 gauge Misc       valsartan (DIOVAN) 160 MG tablet Take 1 tablet (160 mg total) by mouth once daily. 90 tablet 3    vitamin E 1000 UNIT capsule Take 1,000 Units by mouth once daily.      aspirin 81 MG Chew Take 1 tablet (81 mg total) by mouth once daily. 90 tablet 3    EPINEPHrine (EPIPEN) 0.3 mg/0.3 mL AtIn Inject 0.3 mLs (0.3 mg total) into the muscle once. for 1 dose 0.3 mL 1     No current facility-administered medications for this visit.       Patient Care Team:  Jorge Paris MD as PCP - General (Internal Medicine)  Javier Reilly OD as Consulting Physician (Optometry)  Aiden Zee OD as Consulting Physician (Ophthalmology)  Mary Bojorquez III, MD as Consulting Physician (Orthopedic Surgery)  [facility] " - [place] (DME Provider)  Haritha Barbour MA as Care Coordinator         - The patient is given an After Visit Summary that lists all medications with directions, allergies, education, orders placed during this encounter and follow-up instructions.      - I have reviewed the patient's medical information including past medical, family, and social history sections including the medications and allergies.      - We discussed the patient's current medications.     This note was created by combination of typed  and MModal dictation.  Transcription errors may be present.  If there are any questions, please contact me.       Fransico Jhaveri NP

## 2023-04-24 ENCOUNTER — TELEPHONE (OUTPATIENT)
Dept: NEUROLOGY | Facility: CLINIC | Age: 73
End: 2023-04-24
Payer: MEDICARE

## 2023-04-24 ENCOUNTER — PATIENT MESSAGE (OUTPATIENT)
Dept: ADMINISTRATIVE | Facility: HOSPITAL | Age: 73
End: 2023-04-24
Payer: MEDICARE

## 2023-04-24 NOTE — TELEPHONE ENCOUNTER
----- Message from Julianne Marcelo PA-C sent at 4/24/2023  3:06 PM CDT -----  Regarding: FW: pt returned call    ----- Message -----  From: Terri Brock  Sent: 4/24/2023   1:42 PM CDT  To: [facility] Staff  Subject: pt returned call                                 Name of Who is Calling:CONCHIS MUÑIZ [[id number]          What is the request in detail:   pt returned medical assistant call please advise pt asked to leave a vm of her new appointment           Can the clinic reply by [facility]: no           What Number to Call Back if not in [facility]: [phone] (home)

## 2023-04-24 NOTE — TELEPHONE ENCOUNTER
Called and left patient a voice mail about canceled appointment with Davian today at 3:00. Offered to reschedule them with Dr. Morillo this Friday 4/28/23 at 3:30 pm. Asked them to call back to get an appointment rescheduled.

## 2023-04-25 ENCOUNTER — TELEPHONE (OUTPATIENT)
Dept: BEHAVIORAL HEALTH | Facility: CLINIC | Age: 73
End: 2023-04-25
Payer: MEDICARE

## 2023-04-25 NOTE — PROGRESS NOTES
Patient was referred to the [facility] program by PCP.  CHW tired to reach out to patient a total of three times.  Patient referral was removed from the [facility] program.  CHW sent follow up message to patient's PCP via [employer].

## 2023-04-28 ENCOUNTER — PES CALL (OUTPATIENT)
Dept: ADMINISTRATIVE | Facility: CLINIC | Age: 73
End: 2023-04-28
Payer: MEDICARE

## 2023-04-28 ENCOUNTER — OFFICE VISIT (OUTPATIENT)
Dept: NEUROLOGY | Facility: CLINIC | Age: 73
End: 2023-04-28
Payer: MEDICARE

## 2023-04-28 VITALS
HEART RATE: 64 BPM | SYSTOLIC BLOOD PRESSURE: 152 MMHG | WEIGHT: 167.56 LBS | DIASTOLIC BLOOD PRESSURE: 63 MMHG | HEIGHT: 67 IN | BODY MASS INDEX: 26.3 KG/M2

## 2023-04-28 DIAGNOSIS — G40.109 EPILEPSIA PARTIALIS CONTINUA: Primary | ICD-10-CM

## 2023-04-28 DIAGNOSIS — M79.7 FIBROMYALGIA: ICD-10-CM

## 2023-04-28 DIAGNOSIS — E11.59 HYPERTENSION ASSOCIATED WITH DIABETES: Chronic | ICD-10-CM

## 2023-04-28 DIAGNOSIS — E11.00 UNCONTROLLED TYPE 2 DIABETES MELLITUS WITH HYPEROSMOLAR NONKETOTIC HYPERGLYCEMIA: ICD-10-CM

## 2023-04-28 DIAGNOSIS — F41.9 ANXIETY: Chronic | ICD-10-CM

## 2023-04-28 DIAGNOSIS — N18.31 STAGE 3A CHRONIC KIDNEY DISEASE: ICD-10-CM

## 2023-04-28 DIAGNOSIS — I15.2 HYPERTENSION ASSOCIATED WITH DIABETES: Chronic | ICD-10-CM

## 2023-04-28 DIAGNOSIS — C82.90 FOLLICULAR LYMPHOMA, UNSPECIFIED FOLLICULAR LYMPHOMA TYPE, UNSPECIFIED BODY REGION: ICD-10-CM

## 2023-04-28 PROCEDURE — 3077F PR MOST RECENT SYSTOLIC BLOOD PRESSURE >= 140 MM HG: ICD-10-PCS | Mod: HCNC,CPTII,S$GLB, | Performed by: PSYCHIATRY & NEUROLOGY

## 2023-04-28 PROCEDURE — 1125F AMNT PAIN NOTED PAIN PRSNT: CPT | Mod: HCNC,CPTII,S$GLB, | Performed by: PSYCHIATRY & NEUROLOGY

## 2023-04-28 PROCEDURE — 3046F PR MOST RECENT HEMOGLOBIN A1C LEVEL > 9.0%: ICD-10-PCS | Mod: HCNC,CPTII,S$GLB, | Performed by: PSYCHIATRY & NEUROLOGY

## 2023-04-28 PROCEDURE — 1111F PR DISCHARGE MEDS RECONCILED W/ CURRENT OUTPATIENT MED LIST: ICD-10-PCS | Mod: HCNC,CPTII,S$GLB, | Performed by: PSYCHIATRY & NEUROLOGY

## 2023-04-28 PROCEDURE — 3288F PR FALLS RISK ASSESSMENT DOCUMENTED: ICD-10-PCS | Mod: HCNC,CPTII,S$GLB, | Performed by: PSYCHIATRY & NEUROLOGY

## 2023-04-28 PROCEDURE — 1159F PR MEDICATION LIST DOCUMENTED IN MEDICAL RECORD: ICD-10-PCS | Mod: HCNC,CPTII,S$GLB, | Performed by: PSYCHIATRY & NEUROLOGY

## 2023-04-28 PROCEDURE — 99215 OFFICE O/P EST HI 40 MIN: CPT | Mod: HCNC,S$GLB,, | Performed by: PSYCHIATRY & NEUROLOGY

## 2023-04-28 PROCEDURE — 99215 PR OFFICE/OUTPT VISIT, EST, LEVL V, 40-54 MIN: ICD-10-PCS | Mod: HCNC,S$GLB,, | Performed by: PSYCHIATRY & NEUROLOGY

## 2023-04-28 PROCEDURE — 3008F PR BODY MASS INDEX (BMI) DOCUMENTED: ICD-10-PCS | Mod: HCNC,CPTII,S$GLB, | Performed by: PSYCHIATRY & NEUROLOGY

## 2023-04-28 PROCEDURE — 1160F PR REVIEW ALL MEDS BY PRESCRIBER/CLIN PHARMACIST DOCUMENTED: ICD-10-PCS | Mod: HCNC,CPTII,S$GLB, | Performed by: PSYCHIATRY & NEUROLOGY

## 2023-04-28 PROCEDURE — 1159F MED LIST DOCD IN RCRD: CPT | Mod: HCNC,CPTII,S$GLB, | Performed by: PSYCHIATRY & NEUROLOGY

## 2023-04-28 PROCEDURE — 3008F BODY MASS INDEX DOCD: CPT | Mod: HCNC,CPTII,S$GLB, | Performed by: PSYCHIATRY & NEUROLOGY

## 2023-04-28 PROCEDURE — 1125F PR PAIN SEVERITY QUANTIFIED, PAIN PRESENT: ICD-10-PCS | Mod: HCNC,CPTII,S$GLB, | Performed by: PSYCHIATRY & NEUROLOGY

## 2023-04-28 PROCEDURE — 99999 PR PBB SHADOW E&M-EST. PATIENT-LVL V: ICD-10-PCS | Mod: PBBFAC,HCNC,, | Performed by: PSYCHIATRY & NEUROLOGY

## 2023-04-28 PROCEDURE — 3078F DIAST BP <80 MM HG: CPT | Mod: HCNC,CPTII,S$GLB, | Performed by: PSYCHIATRY & NEUROLOGY

## 2023-04-28 PROCEDURE — 3077F SYST BP >= 140 MM HG: CPT | Mod: HCNC,CPTII,S$GLB, | Performed by: PSYCHIATRY & NEUROLOGY

## 2023-04-28 PROCEDURE — 1111F DSCHRG MED/CURRENT MED MERGE: CPT | Mod: HCNC,CPTII,S$GLB, | Performed by: PSYCHIATRY & NEUROLOGY

## 2023-04-28 PROCEDURE — 99999 PR PBB SHADOW E&M-EST. PATIENT-LVL V: CPT | Mod: PBBFAC,HCNC,, | Performed by: PSYCHIATRY & NEUROLOGY

## 2023-04-28 PROCEDURE — 1101F PT FALLS ASSESS-DOCD LE1/YR: CPT | Mod: HCNC,CPTII,S$GLB, | Performed by: PSYCHIATRY & NEUROLOGY

## 2023-04-28 PROCEDURE — 3046F HEMOGLOBIN A1C LEVEL >9.0%: CPT | Mod: HCNC,CPTII,S$GLB, | Performed by: PSYCHIATRY & NEUROLOGY

## 2023-04-28 PROCEDURE — 3078F PR MOST RECENT DIASTOLIC BLOOD PRESSURE < 80 MM HG: ICD-10-PCS | Mod: HCNC,CPTII,S$GLB, | Performed by: PSYCHIATRY & NEUROLOGY

## 2023-04-28 PROCEDURE — 3288F FALL RISK ASSESSMENT DOCD: CPT | Mod: HCNC,CPTII,S$GLB, | Performed by: PSYCHIATRY & NEUROLOGY

## 2023-04-28 PROCEDURE — 4010F PR ACE/ARB THEARPY RXD/TAKEN: ICD-10-PCS | Mod: HCNC,CPTII,S$GLB, | Performed by: PSYCHIATRY & NEUROLOGY

## 2023-04-28 PROCEDURE — 4010F ACE/ARB THERAPY RXD/TAKEN: CPT | Mod: HCNC,CPTII,S$GLB, | Performed by: PSYCHIATRY & NEUROLOGY

## 2023-04-28 PROCEDURE — 1160F RVW MEDS BY RX/DR IN RCRD: CPT | Mod: HCNC,CPTII,S$GLB, | Performed by: PSYCHIATRY & NEUROLOGY

## 2023-04-28 PROCEDURE — 1101F PR PT FALLS ASSESS DOC 0-1 FALLS W/OUT INJ PAST YR: ICD-10-PCS | Mod: HCNC,CPTII,S$GLB, | Performed by: PSYCHIATRY & NEUROLOGY

## 2023-04-28 NOTE — PATIENT INSTRUCTIONS
You came to [facility] because of facial twitching and inability to speak while your blood sugar was very high. 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  It is very common in people with blood sugar that gets extremely high. It is not discrete seizures.  It is abnormal neuronal connections triggered by the very high blood sugar. The treatment for this is correcting your blood sugar. I have place a referral to endocrinology. Please call Monday to set up a new appointment. You do not meet criteria for epilepsy or treatment with antiseizure medication. You may stop the keppra at this time. Just know, if your sugar continues to be out of control, this abnormal movement may become more common. If it becomes a more prominent problem, come back to clinic and we will come of with a plan to try and make your symptoms more manageable.    Return to clinic with any new issues. Please patient portal with any questions or concerns.    Beba Morillo MD PhD [facility]  Neurology-Epilepsy  [facility].    Forms/Letters/Disability/DMV Paperwork: We understand the importance of filling out forms and providing letters in a timely manner.  However, many of these forms have very tricky language and once an official form is submitted as part of the medical record, it can not be modified or erased.  Please work with us in order to get these forms filled out in the most complete, accurate, and efficient way. 1.  Once you are aware that a form will need to be completed, please make an appointment.  A virtual appointment with Dr. Morillo or Mckenzie is perfectly fine. 2.  Please fill out the form as much as you can.  There are many questions that we do not have an answer for.  Please bring a blank copy of the form and your partially filled out form to the clinic visit or send them to us over the portal.  We will complete the form together with you during the clinic visit, sign it, and either return it to you  or send it to the correct destination.  Every form will require an appointment however we can fill out multiple forms at once if needed.  Please do not hesitate to reach out with any questions or concerns about this policy.  We are trying to make sure that we have a system in place to meet this need which works for everyone involved.  Thank you for your understanding.

## 2023-04-28 NOTE — PROGRESS NOTES
"Name: Lroena Contreras  MRN:[id number]   CSN: [id number]  Date of service: 2023  Age:72 y.o.   Gender:female   Referring Physician/Service: Mat Garcia MD  [address] [place] [address] [place] [id number]   The patient is here today with:  Milton, sister     Neurology Clinic: Initial Visit    CHIEF COMPLAINT:  Right facial EPC and language difficulties in the setting of DKA    HPI 2023:     Age of first seizure: 72yo   Handedness: right   Seizure Risk Factors: no family history of seizures, no head strike with LOC, No CNS infections, term  with no prolonged hospitalization, lymphoma remission,  passed away 2 years ago which caused a lot of stress  Time of Last Seizure: 2023,  2023   # of lifetime Seizures: two episodes of right sided facial twitching  by months in the setting of elevated blood sugar  Frequency of Seizures:  2 episodes  by months  Seizure Triggers: high blood sugar (DKA)  Injuries/Hospitalization for seizures? No injury, ED for both -> admitted for both (DKA for the last one)    Driving? No   Pregnancy? 2 kids (50yo 44yo)  Contraception? Menopause   Folic Acid? No   Bone Health: Dexa with osteopenia  Mood: "okay", no SI, no HI     Auras: none     Seizure Events:   1. Right facial twitching with inability to speak, 30-35 seconds, no altered awareness      Current AED/Neuroleptics/SEs:  Levetiracetam 500mg twice daily SE pruritus     Previous AED/SEs or reason for DC.   None     EEG: yes, in the hospital, completed with no result available for review  MRI brain: 2023 - normal     Other Allergies: many drug sensitivities      AED compliance, adherence: some missed doses     ROS 2023:  Peripheral neuropathy. Broke a toe. Does not know how it occurred. Diarrhea. Hives as a kid (could only eat 16 shrimp, anymore would cause a reaction);.  Meniscus surgery x3. Otherwise, denies headache, loss of vision, blurred vision,. diplopia, dysarthria, dysphagia, " "lightheadedness, vertigo, tinnitus or hearing difficulty. Denies difficulties producing or comprehending speech.  Denies focal weakness. Denies difficulty with gait. Denies cough, shortness of breath.  Denies chest pain or tightness, palpitations.  Denies nausea, vomiting, constipation or abdominal pain.  No bowel or bladder incontinence or retention.  No falls.       EXAM:   - Vitals: BP (!) 152/63   Pulse 64   Ht 5' 7" (1.702 m)   Wt 76 kg (167 lb 8.8 oz)   BMI 26.24 kg/m²    - General: Awake, cooperative, NAD.  - HEENT: NC/AT  - Neck:  Decreased range of motion, muscle spasms  - Pulmonary: no increased WOB  - Cardiac: well perfused   - Abdomen: soft, nontender, nondistended  - Extremities: no edema  - Skin: no rashes or lesions noted.     NEURO EXAM:   - Mental Status: Awake, alert, oriented x 3. Able to relate history without difficulty. Attentive to examiner. Language is fluent with intact repetition and comprehension. Normal prosody. There were no paraphasic errors. Able to name both high and low frequency objects. Speech was not dysarthric. Able to follow both midline and appendicular commands. There was no evidence of apraxia or neglect.    - Cranial Nerves:  VFF to confrontation. EOMI. No facial droop. Hearing intact to finger-rub bilaterally. 5/5 strength in trapezii and SCM bilaterally. Tongue protrudes in midline and to either side with no evidence of atrophy or weakness.    - Motor: Normal bulk and tone throughout. No pronator drift bilaterally.  Fine axial/appendicular action tremor    Delt Bic Tri WrE WrF  FFl FE IO IP Quad Ham TA Gastroc    R   5     5    5    5    5        5   5    5   5    5        5     5      5            L   5      5    5   5    5        5    5   5    5    5       5     5      5              - Sensory: No deficits to light touch. No extinction to DSS.  - Coordination: No dysmetria on FNF bilaterally.  - Gait: Good initiation.  Mildly wide-based, normal stride and arm swing.  " Missteps on turn.  Romberg with prominent sway with eyes open.    PLAN:  72-year-old woman admitted with DKA with brief episodes of right facial twitching consistent with epilepsia partialis continua in the setting of marked hyperglycemia.  Treatment for this is to normalize the blood sugar.  These are provoked episodes.  She does not meet criteria for the diagnosis of epilepsy. Levetiracetam is causing significant pruritus -> okay to discontinue this medication at this time.  Asked her to return to clinic if the episodes of twitching become more prominent and/or bothersome.  Follow up if symptoms worsen or fail to improve.     Patient Instructions   You came to Epilepsy Clinic because of facial twitching and inability to speak while your blood sugar was very high. 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  It is very common in people with blood sugar that gets extremely high. It is not discrete seizures.  It is abnormal neuronal connections triggered by the very high blood sugar. The treatment for this is correcting your blood sugar. I have place a referral to endocrinology. Please call Monday to set up a new appointment. You do not meet criteria for epilepsy or treatment with antiseizure medication. You may stop the keppra at this time. Just know, if your sugar continues to be out of control, this abnormal movement may become more common. If it becomes a more prominent problem, come back to clinic and we will come of with a plan to try and make your symptoms more manageable.    Return to clinic with any new issues. Please patient portal with any questions or concerns.    Beba Morillo MD PhD [facility]  Neurology-Epilepsy  [facility].    Forms/Letters/Disability/DMV Paperwork: We understand the importance of filling out forms and providing letters in a timely manner.  However, many of these forms have very tricky language and once an official form is submitted as part of the  medical record, it can not be modified or erased.  Please work with us in order to get these forms filled out in the most complete, accurate, and efficient way. 1.  Once you are aware that a form will need to be completed, please make an appointment.  A virtual appointment with Dr. Morillo or Mckenzie is perfectly fine. 2.  Please fill out the form as much as you can.  There are many questions that we do not have an answer for.  Please bring a blank copy of the form and your partially filled out form to the clinic visit or send them to us over the portal.  We will complete the form together with you during the clinic visit, sign it, and either return it to you or send it to the correct destination.  Every form will require an appointment however we can fill out multiple forms at once if needed.  Please do not hesitate to reach out with any questions or concerns about this policy.  We are trying to make sure that we have a system in place to meet this need which works for everyone involved.  Thank you for your understanding.            Problem List Items Addressed This Visit       Anxiety (Chronic)    Hypertension associated with diabetes (Chronic)    Follicular lymphoma    Overview       follicular lymphoma diagnosed in late 2009. She was treated with chemotherapy (withour Rituximab due to allergy, unclear regimen) and apparently achieved a CR. She was then lost to follow up die to lack of insurance until 6/26/13 when she re-establish care in our clinic. Surveillance CTs from 7/9/13 are significant for unspecific some tissue prominence in the ventral tongue that is unspecific, unspecific pulmonary nodules and non bulky LAD in the internal jugular chain measuring up to 14 mm. These finding are unspecific and not indicative of follicular lymphoma recurrence.           Fibromyalgia    Uncontrolled type 2 diabetes mellitus with hyperosmolar nonketotic hyperglycemia    Stage 3a chronic kidney disease    Epilepsia partialis  continua - Primary       More than 50% of the 47 minutes spent with the patient (as well as family/caregiver(s) was spent on face-to-face counseling. Total time spent on encounter: 68 minutes    Disclaimer: This note has been generated using voice-recognition software. There may be typographical errors that were missed during proof-reading.     LABS:  Recent Labs   Lab 11/07/20  0924 06/01/21  1048 06/02/21  0452 06/03/21  0345 10/22/21  1629 02/11/22  1100 05/26/22  1040 06/18/22  1237 06/19/22  1823 06/19/22  2110 06/20/22  0417 06/20/22  0525 06/21/22  0349 09/24/22  2235 04/10/23  0012 04/10/23  0416 04/10/23  0832 04/11/23  0403 04/11/23  0807 04/11/23  1158 04/11/23  1600   WBC 12.70 15.31 H 11.78   < >  --   --  10.74   < > 10.57  --   --    < > 7.32   < > 12.73 H  --   --  8.60  --   --   --    Hemoglobin 9.5 L 9.3 L 8.7 L   < >  --   --  12.7   < > 12.7  --   --    < > 11.5 L   < > 13.0  --   --  11.2 L  --   --   --    Hematocrit 31.8 L 30.1 L 28.8 L   < >  --   --  39.3   < > 38.5  --   --    < > 36.9 L   < > 38.8  --   --  35.2 L  --   --   --    Platelets 218 293 280   < >  --   --  237   < > 205  --   --    < > 186   < > 265  --   --  174  --   --   --    Sodium  --  137 139   < >  --  138 133 L   < > 128 L   < > 137  --  138   < > 132 L 137   < > 141   < > 137 137   Potassium  --  4.4 3.8   < >  --  4.7 4.8   < > 4.6   < > 4.4  --  4.9   < > 4.1 3.6   < > 4.4   < > 4.5 5.1   BUN  --  27 H 22   < >  --  20 32 H   < > 23   < > 17  --  19   < > 37 H 34 H   < > 22   < > 18 17   Creatinine  --  1.3 1.1   < >  --  1.0 1.4   < > 1.4   < > 1.0  --  1.2   < > 2.2 H 1.5 H   < > 1.0   < > 1.1 1.2   eGFR if   --  48 A 59 A   < >  --  >60.0 43.6 A   < > 44 A   < > >60  --  53 A  --   --   --   --   --   --   --   --    eGFR if non   --  42 A 51 A   < >  --  56.8 A 37.8 A   < > 38 A   < > 57 A  --  46 A  --   --   --   --   --   --   --   --    Hemoglobin A1C 11.9 H 11.0 H  --   --   13.8 H 9.9 H >14.0 H  --   --   --   --   --   --   --   --  13.9 H  --   --   --   --   --    TSH  --   --   --   --  0.403  --   --   --  0.377 L  --   --   --   --   --  0.602  --   --   --   --   --   --    Folate  --   --  12.7  --   --   --   --   --   --   --   --   --   --   --   --   --   --   --   --   --   --    Vitamin B-12 208 L  --  858  --   --   --  1161 H  --   --   --   --   --   --   --   --   --   --   --   --   --   --     < > = values in this interval not displayed.              IMAGING:  Recent imaging is personally reviewed with the patient.    Results for orders placed during the hospital encounter of 06/19/22    MRI Brain Without Contrast    Impression  1. No acute intracranial findings identified.  No etiology of the patient's symptoms is seen.  2. Additional details as per the body of report.      Electronically signed by: Rylan Way  Date:    06/20/2022  Time:    12:06    Results for orders placed during the hospital encounter of 04/09/23    MRI Brain W WO Contrast    Impression  No acute intracranial abnormality.      Electronically signed by: Kenneth Caceres MD  Date:    04/10/2023  Time:    15:18    Results for orders placed during the hospital encounter of 04/09/23    CT Head Without Contrast    Impression  No acute intracranial abnormalities identified.      Electronically signed by: Leatha Palomo MD  Date:    04/10/2023  Time:    00:43    Results for orders placed during the hospital encounter of 12/08/16    DXA Bone Density Spine And Hip_Axial Skeleton    Impression  Low bone mass/osteopenia of the femoral neck. FRAX calculations do not suggest treatment.          Recommendations:  1) Adequate calcium and Vitamin D therapy  2) Appropriate exercise  3) Consider repeat BMD in 2 years.      EXPLANATION OF RESULTS:  The t-score compares this results to the bone density of a 25 year old of the same gender. The z-score compares this result to the average bone density to people of the  same age and gender. The amounts indicate the number of standard deviations above or below the mean.  * Osteoporosis is generally defined as having a t-score between less than -2.5.  * Osteopenia is generally defined as having a t-score between -1 and -2.5.  * The normal range is generally defined as having a t-score between -1 to 1.      Electronically signed by: OLAF PISANO MD  Date:     12/09/16  Time:    17:58    PAST MEDICAL HISTORY:   Active Ambulatory Problems     Diagnosis Date Noted    Myalgia and myositis, unspecified 01/24/2014    Dysphagia 01/24/2014    Anxiety 01/24/2014    Hypertension associated with diabetes 01/24/2014    Follicular lymphoma 08/26/2014    Fibromyalgia 07/21/2015    Hyperlipidemia 07/30/2015    Cardiomegaly 08/12/2015    Uncontrolled type 2 diabetes mellitus with hyperosmolar nonketotic hyperglycemia 11/08/2016    Mild nonproliferative diabetic retinopathy of both eyes associated with type 2 diabetes mellitus 03/07/2017    Coronary artery disease of native artery of native heart with stable angina pectoris 03/29/2019    Stage 3a chronic kidney disease 12/10/2019    Pulmonary hypertension -- echo 11/2019 02/06/2020    History of myocardial infarction 02/06/2020    Moderate episode of recurrent major depressive disorder 03/12/2013    Peripheral vascular disease in diabetes mellitus -- CLEMENT 05/2019 11/07/2020    Dog bite of right foot 05/28/2021    Iron deficiency anemia 06/01/2021    MSSA (methicillin susceptible Staphylococcus aureus) infection 06/07/2021    Foot ulceration, right, with fat layer exposed     History of TIA (transient ischemic attack) 07/21/2021    RLS (restless legs syndrome) 07/21/2021    Osteomyelitis of foot 07/27/2021    Aortic atherosclerosis 08/10/2021    Osteopenia of neck of femur 08/10/2021    Chronic diastolic heart failure 08/10/2021    Proliferative diabetic retinopathy of left eye associated with type 2 diabetes mellitus, unspecified proliferative  retinopathy type 02/11/2022    Hypertensive emergency 06/19/2022    Hypertensive encephalopathy 06/19/2022    Status post tooth extraction 06/19/2022    Epilepsia partialis continua 04/28/2023     Resolved Ambulatory Problems     Diagnosis Date Noted    Lymphoma 01/24/2014    Type II or unspecified type diabetes mellitus with neurological manifestations, uncontrolled(250.62) 01/24/2014    Fatigue 01/24/2014    Abdominal pain, unspecified site 08/26/2014    Dizziness 10/03/2014    Palpitations 10/03/2014    Uncontrolled stage 2 hypertension 10/03/2014    Chest pain 10/03/2014    Hypoglycemia due to insulin 06/15/2015    Peripheral neuropathy 07/30/2015    Leg edema, left 08/12/2015    Diabetic peripheral neuropathy associated with type 2 diabetes mellitus 11/05/2015    BMI 30.0-30.9,adult 11/05/2015    Non morbid obesity due to excess calories 02/12/2016    Obesity (BMI 30-39.9) 05/19/2016    History of follicular lymphoma 11/28/2016    Persistent microalbuminuria associated with type 2 diabetes mellitus 05/05/2017    Acute inferior myocardial infarction 03/29/2019    Hypomagnesemia 03/31/2019    Chest pain 04/09/2019    CAD (coronary artery disease) 12/30/2019    Anaphylactic shock due to food 03/12/2013    Cataract 11/07/2020    Diabetic autonomic neuropathy 03/12/2013    Dog bite 05/28/2021    Failure of outpatient treatment 06/01/2021    Cellulitis of great toe, right 06/01/2021    Abscess or cellulitis of foot     Healthcare maintenance 10/22/2021    Occlusion of peripherally inserted central catheter (PICC) line, initial encounter 02/11/2022    KYA (acute kidney injury) 04/10/2023    Seizure-like activity 04/10/2023    DKA, type 2 04/10/2023    Stroke-like symptoms 04/10/2023    Leukocytosis 04/10/2023     Past Medical History:   Diagnosis Date    Allergy     Altered mental status 06/19/2022    Anemia     Arthritis     Colon polyps     Coronary artery disease     Depression     Diabetes mellitus, type II      Disorder of kidney and ureter     GERD (gastroesophageal reflux disease)     HTN (hypertension)     MI (myocardial infarction) 03/2019    Personal history of colonic polyps     Restless leg syndrome     Stroke         PAST SURGICAL HISTORY:   Past Surgical History:   Procedure Laterality Date    COLONOSCOPY  11/07/2012    Colon polyp found; repeat in 5 years    ELBOW SURGERY Right 2015    dislocation repair     ESOPHAGOGASTRODUODENOSCOPY  11/07/2012    atrophic gastritis, H pylori testing negative    INCISION AND DRAINAGE FOOT Right 6/2/2021    Procedure: INCISION AND DRAINAGE, FOOT, bone biopsy;  Surgeon: Quiana Penn DPM;  Location: [facility] OR;  Service: Podiatry;  Laterality: Right;    KNEE SURGERY Bilateral 2015    scoped    LEFT HEART CATHETERIZATION Left 3/29/2019    Procedure: Left heart cath;  Surgeon: Bladimir Barbosa MD;  Location: [facility] CATH LAB;  Service: Cardiology;  Laterality: Left;    LEFT HEART CATHETERIZATION Left 11/18/2019    Procedure: Left heart cath;  Surgeon: Karl Rico MD;  Location: [facility] CATH LAB;  Service: Cardiology;  Laterality: Left;    LEFT HEART CATHETERIZATION Left 1/8/2020    Procedure: Left heart cath, right radial, noon start;  Surgeon: Christos Monreal MD;  Location: [facility] CATH LAB;  Service: Cardiology;  Laterality: Left;  RN Pre Op 1-6-20.  To be admitted 1-7-20 sor Aspirin Disensitation    TONSILLECTOMY  1955    ULTRASOUND GUIDANCE  1/8/2020    Procedure: Ultrasound Guidance;  Surgeon: Christos Monreal MD;  Location: [facility] CATH LAB;  Service: Cardiology;;        ALLERGIES: Novolin 70/30 (semi-synthetic), Sulfa (sulfonamide antibiotics), Talwin [pentazocine lactate], Victoza [liraglutide], Glipizide, Citric acid, Codeine, Influenza virus vaccines, Iodine and iodide containing products, Levetiracetam, Rituxan [rituximab], and Zoloft [sertraline]   CURRENT MEDICATIONS:   Current Outpatient Medications   Medication Sig Dispense Refill    acetaminophen (TYLENOL) 500 MG tablet  Take 1 tablet (500 mg total) by mouth every 4 (four) hours as needed for Pain. (Patient taking differently: Take 1,000 mg by mouth every 4 (four) hours as needed for Pain.)  0    albuterol (PROVENTIL/VENTOLIN HFA) 90 mcg/actuation inhaler Inhale 2 puffs into the lungs every 6 (six) hours as needed for Wheezing or Shortness of Breath. 18 g 0    amLODIPine (NORVASC) 10 MG tablet TAKE 1 TABLET EVERY DAY 90 tablet 3    ASCORBIC ACID, VITAMIN C, ORAL Take by mouth once daily.      atenoloL (TENORMIN) 50 MG tablet Take 1 tablet (50 mg total) by mouth 2 (two) times daily. 180 tablet 3    atorvastatin (LIPITOR) 80 MG tablet Take 1 tablet (80 mg total) by mouth every evening. 90 tablet 3    Bacillus coagulans (DIGESTIVE ADVANTAGE IMMUNE ORAL) Take by mouth.      blood sugar diagnostic (FREESTYLE TEST) Strp 1 strip by Misc.(Non-Drug; Combo Route) route 4 (four) times daily. 400 each 4    cholecalciferol, vitamin D3, (VITAMIN D3) 50 mcg (2,000 unit) Cap Take 2 capsules by mouth 2 (two) times daily.      cyanocobalamin (VITAMIN B-12) 1000 MCG tablet Take 100 mcg by mouth once daily.      diclofenac sodium (VOLTAREN TOP) Apply topically.      ESOMEPRAZOLE MAGNESIUM ORAL Take by mouth as needed.      ferrous sulfate 325 (65 FE) MG EC tablet Take 1 tablet (325 mg total) by mouth once daily. 30 tablet 11    FLUoxetine 40 MG capsule Take 1 capsule (40 mg total) by mouth once daily. 90 capsule 3    gabapentin (NEURONTIN) 300 MG capsule Take 2 capsules by mouth twice daily 180 capsule 0    glimepiride (AMARYL) 2 MG tablet Take 1 tablet (2 mg total) by mouth before breakfast. 90 tablet 0    insulin aspart U-100 (NOVOLOG FLEXPEN U-100 INSULIN) 100 unit/mL (3 mL) InPn pen Use with sliding scale before meals: 150-200=+2, 201-250=+4; 251-300=+6; 301-350=+8, over 350=+10 units 15 mL 0    insulin degludec (TRESIBA FLEXTOUCH U-200) 200 unit/mL (3 mL) insulin pen Inject 22 units once daily and titrate to 40 units 3 pen 1    isosorbide  "mononitrate (IMDUR) 30 MG 24 hr tablet Take 1 tablet (30 mg total) by mouth once daily. 90 tablet 3    lancets 32 gauge Misc 1 lancet by Misc.(Non-Drug; Combo Route) route 4 (four) times daily. 360 each 3    magnesium oxide (MAG-OX) 400 mg tablet Take 400 mg by mouth once daily.      melatonin 10 mg Cap Take 10 mg by mouth nightly.      multivitamin/iron/folic acid (CENTRUM COMPLETE ORAL) Take by mouth.      pantoprazole (PROTONIX) 40 MG tablet Take 1 tablet by mouth once daily 90 tablet 0    pen needle, diabetic (BD ULTRA-FINE SAGAR PEN NEEDLE) 32 gauge x 5/32" Ndle USE WITH NOVOLOG MIX FLEXPENS 400 each 3    ticagrelor (BRILINTA) 90 mg tablet Take 1 tablet (90 mg total) by mouth 2 (two) times daily. 180 tablet 3    TRUEPLUS LANCETS 30 gauge Misc       vitamin E 1000 UNIT capsule Take 1,000 Units by mouth once daily.      aspirin 81 MG Chew Take 1 tablet (81 mg total) by mouth once daily. 90 tablet 3    dulaglutide (TRULICITY) 4.5 mg/0.5 mL pen injector Inject 4.5 mg into the skin every 7 days. (Patient not taking: Reported on 4/28/2023) 4 pen 3    EPINEPHrine (EPIPEN) 0.3 mg/0.3 mL AtIn Inject 0.3 mLs (0.3 mg total) into the muscle once. for 1 dose 0.3 mL 1    flavoring agent, bulk, (CLOVE FLAVORING) Oil 1 Dose by Misc.(Non-Drug; Combo Route) route every 4 (four) hours as needed (dental pain). (Patient not taking: Reported on 4/28/2023) 3.7 mL 0    fluticasone propionate (FLONASE) 50 mcg/actuation nasal spray 2 sprays (100 mcg total) by Each Nostril route once daily. (Patient not taking: Reported on 4/28/2023) 16 g 0    loratadine (CLARITIN) 10 mg tablet Take 1 tablet (10 mg total) by mouth every morning. (Patient not taking: Reported on 4/28/2023) 60 tablet 0    meclizine (ANTIVERT) 25 mg tablet Take 1 tablet (25 mg total) by mouth 3 (three) times daily as needed for Dizziness. (Patient not taking: Reported on 4/28/2023) 20 tablet 0    nitroGLYCERIN (NITROSTAT) 0.4 MG SL tablet PLACE 1 TABLET UNDER THE TONGUE " EVERY FIVE MINUTES AS NEEDED FOR CHEST PAIN AS DIRECTED (Patient not taking: Reported on 4/28/2023) 25 tablet 11    valsartan (DIOVAN) 160 MG tablet Take 1 tablet (160 mg total) by mouth once daily. 90 tablet 3     No current facility-administered medications for this visit.        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  2 dtr.  Lives with .  3 cats and a dog.  Retired.  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  Lives in house, 1 story and 4 steps up and has a ramp.      Enjoys crafting.  Unable to bowl due to myalgias.           Questions and concerns raised by the patient and family/care-giver(s) were addressed and they indicated understanding of everything discussed and agreed to plans as above.    Beba Morillo MD PhD FACNS  Neurology-Epilepsy  [facility].

## 2023-05-05 ENCOUNTER — TELEPHONE (OUTPATIENT)
Dept: FAMILY MEDICINE | Facility: CLINIC | Age: 73
End: 2023-05-05
Payer: MEDICARE

## 2023-05-05 NOTE — TELEPHONE ENCOUNTER
----- Message from Sarahy Nayak sent at 5/5/2023  1:08 PM CDT -----  Regarding: [id number]  Type:  Sooner Appointment Request    Patient is requesting a sooner appointment.  Patient declined first available appointment listed as well as another facility and provider .  Patient will not accept being placed on the waitlist and is requesting a message be sent to doctor.    Name of Caller: self    When is the first available appointment? 6/6    Symptoms: hosp follow up     Would the patient rather a call back or a response via [facility]? Call back     Best Call Back Number: [phone]        Additional Information: pt states she needs a follow up , and preferably in the after noon if possible.

## 2023-05-05 NOTE — TELEPHONE ENCOUNTER
Patient is demanding to see pcp states she has things to talk about concerning her meds and she has not seen provider in a while

## 2023-05-11 NOTE — PROCEDURES
EEG REPORT      Lorena Contreras  [id number]  1950    DATE OF SERVICE: 4/10/2023     -1,1,2    METHODOLOGY      Extended electroencephalographic recording is made while the patient is ambulatory and continuing normal daily activities.  Electrodes are placed according to the International 10-20 placement system and included T1 and T2 electrode placement.  Twenty four (24) channels of digital signal (sampling rate of 512/sec) was simultaneously recorded from the scalp including EKG and eye monitors.  Recording band pass was 0.1 to 100 hz and all data was stored digitally on the recorder.  The patient is instructed to press an event button when clinical symptoms occur and write the symptoms into a diary. Activation procedures which include photic stimulation, hyperventilation and instructing patients to perform simple task are done in selected patients.        The EEG is displayed on a monitor screen and can be reformatted into different montages for evaluation.  The entire recoding is submitted for computer assisted analysis to detect spike and electrographic seizure activity.  The entire recording is visually reviewed and the times identified by computer analysis as being spikes or seizures are reviewed again.  Compresses spectral analysis (CSA) is also performed on the activity recorded from each individual channel.  This is displayed as a power display of frequencies from 0 to 30 Hz over time.   The CSA analysis is done and displayed continuously.  This is reviewed for asymmetries in power between homologous areas of the scalp and for presence of changes in power which canbe seen when seizures occur.  Sections of suspected abnormalities on the CSA is then compared with the original EEG recording.  .     "[employer] software was also utilized in the review of this study.  This software suite analyzes the EEG recording in multiple domains.  Coherence and rhythmicity is computed to identify EEG sections  "which may contain organized seizures.  Each channel undergoes analysis to detect presence of spike and sharp waves which have special and morphological characteristic of epileptic activity.  The routine EEG recording is converted from spacial into frequency domain.  This is then displayed comparing homologous areas to identify areas of significant asymmetry.  Algorithm to identify non-cortically generated artifact is used to separate eye movement, EMG and other artifact from the EEG     Recording Times  Start on 4/10/2023  Stop on 4/11/2023    A total of 4:26:21 and 10:30:17 and 1:11:49 hours of EEG was recorded.      EEG FINDINGS:  Background activity:   The background rhythm was characterized by alpha and anterior dominant beta activity with a 10Hz posterior dominant alpha rhythm at 30-70 microvolts.   Symmetry and continuity: the background was continuous and symmetric     Sleep:   Normal sleep transients including sleep spindles, K complexes, vertex waves and POSTS were seen.    Activation procedures:   NA    Abnormal activity:   No epileptiform discharges, periodic discharges, lateralized rhythmic delta activity or electrographic seizures were seen.    There is an event at 16:10 in which the patient says "it's happening" then remains at rest with eyes closed briefly before developing right facial twitching.  There is no change in cortical rhythms with muscle artifact only noted.  She remains appropriately responsive through the event.  There are additional event marks at 16:35 and 17:00.  Video is not available but EEG likewise shows muscle artifact only.    IMPRESSION:   Normal EEG with recording of one event which was non-epileptic in etiology.  Alternate considerations would be hemifacial spasm and psychogenic events.      Brandyn Arguelles MD  Neurology-Epilepsy.  [facility].    "

## 2023-05-22 ENCOUNTER — TELEPHONE (OUTPATIENT)
Dept: FAMILY MEDICINE | Facility: CLINIC | Age: 73
End: 2023-05-22
Payer: MEDICARE

## 2023-05-22 NOTE — TELEPHONE ENCOUNTER
----- Message from Lucinda Jones MA sent at 5/22/2023  1:04 PM CDT -----  Type: Patient Call Back    Who called: Self    What is the request in detail:pt.states she feels she needs to come in due to swelling and wants to be seen by her doctor..     Can the clinic reply by [facility]?No    Would the patient rather a call back or a response via [facility]? yes    Best call back number: [phone] (home)

## 2023-05-22 NOTE — TELEPHONE ENCOUNTER
"Spoke with patient in reference to her swelling? Questioned what part of her body was swelling? Patient stated "her feet", questioned which one? Stated "both" Follow up appointment made for Thursday 5/25/23 @ 7:00 am with her provider.  "

## 2023-05-23 ENCOUNTER — TELEPHONE (OUTPATIENT)
Dept: FAMILY MEDICINE | Facility: CLINIC | Age: 73
End: 2023-05-23
Payer: MEDICARE

## 2023-05-23 NOTE — TELEPHONE ENCOUNTER
Called patient several times to inform that patient scheduled for Thursday @ 7:00 am has to be rescheduled. Left message for patient to call clinic back.

## 2023-05-23 NOTE — TELEPHONE ENCOUNTER
----- Message from Pauline Medina sent at 5/23/2023  8:29 AM CDT -----  .Type: Patient Call Back    Who called:self     What is the request in detail: patient called to confirmed new appointment time would work    Can the clinic reply by [facility]? call    Would the patient rather a call back or a response via [facility]? call    Best call back number:.[phone]     Additional Information:

## 2023-06-02 DIAGNOSIS — F41.9 ANXIETY: ICD-10-CM

## 2023-06-02 DIAGNOSIS — I25.118 CORONARY ARTERY DISEASE OF NATIVE ARTERY OF NATIVE HEART WITH STABLE ANGINA PECTORIS: ICD-10-CM

## 2023-06-02 RX ORDER — METOPROLOL SUCCINATE 50 MG/1
TABLET, EXTENDED RELEASE ORAL
Qty: 90 TABLET | Refills: 3 | Status: ON HOLD | OUTPATIENT
Start: 2023-06-02 | End: 2023-11-06 | Stop reason: HOSPADM

## 2023-06-02 NOTE — TELEPHONE ENCOUNTER
No care due was identified.  [facility] Embedded Care Due Messages. Reference number: [id number].   6/02/2023 4:05:23 PM CDT

## 2023-06-05 RX ORDER — ISOSORBIDE MONONITRATE 30 MG/1
TABLET, EXTENDED RELEASE ORAL
Qty: 90 TABLET | Refills: 0 | OUTPATIENT
Start: 2023-06-05

## 2023-06-05 RX ORDER — FLUOXETINE HYDROCHLORIDE 40 MG/1
CAPSULE ORAL
Qty: 90 CAPSULE | Refills: 0 | OUTPATIENT
Start: 2023-06-05

## 2023-06-05 RX ORDER — TICAGRELOR 90 MG/1
TABLET ORAL
Qty: 180 TABLET | Refills: 0 | OUTPATIENT
Start: 2023-06-05

## 2023-06-19 DIAGNOSIS — I25.118 CORONARY ARTERY DISEASE OF NATIVE ARTERY OF NATIVE HEART WITH STABLE ANGINA PECTORIS: ICD-10-CM

## 2023-06-19 DIAGNOSIS — F41.9 ANXIETY: ICD-10-CM

## 2023-06-19 NOTE — TELEPHONE ENCOUNTER
----- Message from Arron Barrientos sent at 6/19/2023  1:03 PM CDT -----  Regarding: [employer] [facility] [employer] [phone]  Type:  Sooner Appointment Request    Patient is requesting a sooner appointment.  Patient declined first available appointment listed as well as another facility and provider .  Patient will not accept being placed on the waitlist and is requesting a message be sent to doctor.    Name of Caller:  [employer]    When is the first available appointment?  July 5th 2023     Symptoms:  Medication Refill/swelling in feet     Would the patient rather a call back or a response via [facility]?     Best Call Back Number:  [phone]     Additional Information:  Pt stated that she has been trying to get ahold of her PCP, Dr. Jorge Paris for quite a while with no reply, so she wants to see Dr. Thang Zee. Was notified that Dr. Zee is not accepting new pt's as a PCP, but would like to try to see Dr. Zee as soon as possible to see if the pt can get her medication refilled, one being an anti-depressant.

## 2023-06-19 NOTE — TELEPHONE ENCOUNTER
No care due was identified.  [facility] Embedded Care Due Messages. Reference number: [id number].   6/19/2023 9:14:31 AM CDT

## 2023-06-21 RX ORDER — FLUOXETINE HYDROCHLORIDE 40 MG/1
CAPSULE ORAL
Qty: 90 CAPSULE | Refills: 0 | Status: SHIPPED | OUTPATIENT
Start: 2023-06-21 | End: 2023-09-15 | Stop reason: SDUPTHER

## 2023-06-21 RX ORDER — TICAGRELOR 90 MG/1
TABLET ORAL
Qty: 180 TABLET | Refills: 0 | Status: SHIPPED | OUTPATIENT
Start: 2023-06-21 | End: 2023-09-15 | Stop reason: SDUPTHER

## 2023-06-21 RX ORDER — ISOSORBIDE MONONITRATE 30 MG/1
TABLET, EXTENDED RELEASE ORAL
Qty: 90 TABLET | Refills: 0 | Status: SHIPPED | OUTPATIENT
Start: 2023-06-21 | End: 2023-09-01 | Stop reason: SDUPTHER

## 2023-06-22 ENCOUNTER — LAB VISIT (OUTPATIENT)
Dept: LAB | Facility: HOSPITAL | Age: 73
End: 2023-06-22
Attending: INTERNAL MEDICINE
Payer: MEDICARE

## 2023-06-22 ENCOUNTER — OFFICE VISIT (OUTPATIENT)
Dept: FAMILY MEDICINE | Facility: CLINIC | Age: 73
End: 2023-06-22
Payer: MEDICARE

## 2023-06-22 ENCOUNTER — TELEPHONE (OUTPATIENT)
Dept: CARDIOLOGY | Facility: CLINIC | Age: 73
End: 2023-06-22
Payer: MEDICARE

## 2023-06-22 VITALS
SYSTOLIC BLOOD PRESSURE: 140 MMHG | HEIGHT: 67 IN | WEIGHT: 172.38 LBS | OXYGEN SATURATION: 99 % | BODY MASS INDEX: 27.06 KG/M2 | HEART RATE: 72 BPM | TEMPERATURE: 98 F | DIASTOLIC BLOOD PRESSURE: 60 MMHG

## 2023-06-22 DIAGNOSIS — I51.7 CARDIOMEGALY: ICD-10-CM

## 2023-06-22 DIAGNOSIS — E11.9 TYPE 2 DIABETES MELLITUS WITHOUT COMPLICATION: ICD-10-CM

## 2023-06-22 DIAGNOSIS — E11.65 UNCONTROLLED TYPE 2 DIABETES MELLITUS WITH HYPERGLYCEMIA, WITH LONG-TERM CURRENT USE OF INSULIN: ICD-10-CM

## 2023-06-22 DIAGNOSIS — I50.32 CHRONIC DIASTOLIC HEART FAILURE: ICD-10-CM

## 2023-06-22 DIAGNOSIS — E11.59 HYPERTENSION ASSOCIATED WITH DIABETES: Primary | Chronic | ICD-10-CM

## 2023-06-22 DIAGNOSIS — I15.2 HYPERTENSION ASSOCIATED WITH DIABETES: Primary | Chronic | ICD-10-CM

## 2023-06-22 DIAGNOSIS — N18.31 STAGE 3A CHRONIC KIDNEY DISEASE: ICD-10-CM

## 2023-06-22 DIAGNOSIS — Z79.4 UNCONTROLLED TYPE 2 DIABETES MELLITUS WITH HYPERGLYCEMIA, WITH LONG-TERM CURRENT USE OF INSULIN: ICD-10-CM

## 2023-06-22 DIAGNOSIS — M79.89 LEG SWELLING: Primary | ICD-10-CM

## 2023-06-22 LAB
ALBUMIN/CREAT UR: 921.7 UG/MG (ref 0–30)
CREAT UR-MCNC: 60 MG/DL (ref 15–325)
MICROALBUMIN UR DL<=1MG/L-MCNC: 553 UG/ML

## 2023-06-22 PROCEDURE — 1101F PT FALLS ASSESS-DOCD LE1/YR: CPT | Mod: CPTII,S$GLB,,

## 2023-06-22 PROCEDURE — 3046F HEMOGLOBIN A1C LEVEL >9.0%: CPT | Mod: CPTII,S$GLB,,

## 2023-06-22 PROCEDURE — 3046F PR MOST RECENT HEMOGLOBIN A1C LEVEL > 9.0%: ICD-10-PCS | Mod: CPTII,S$GLB,,

## 2023-06-22 PROCEDURE — 82570 ASSAY OF URINE CREATININE: CPT | Performed by: INTERNAL MEDICINE

## 2023-06-22 PROCEDURE — 99999 PR PBB SHADOW E&M-EST. PATIENT-LVL V: CPT | Mod: PBBFAC,,,

## 2023-06-22 PROCEDURE — 1159F PR MEDICATION LIST DOCUMENTED IN MEDICAL RECORD: ICD-10-PCS | Mod: CPTII,S$GLB,,

## 2023-06-22 PROCEDURE — 3078F PR MOST RECENT DIASTOLIC BLOOD PRESSURE < 80 MM HG: ICD-10-PCS | Mod: CPTII,S$GLB,,

## 2023-06-22 PROCEDURE — 3288F PR FALLS RISK ASSESSMENT DOCUMENTED: ICD-10-PCS | Mod: CPTII,S$GLB,,

## 2023-06-22 PROCEDURE — 4010F ACE/ARB THERAPY RXD/TAKEN: CPT | Mod: CPTII,S$GLB,,

## 2023-06-22 PROCEDURE — 3077F SYST BP >= 140 MM HG: CPT | Mod: CPTII,S$GLB,,

## 2023-06-22 PROCEDURE — 3008F BODY MASS INDEX DOCD: CPT | Mod: CPTII,S$GLB,,

## 2023-06-22 PROCEDURE — 1160F RVW MEDS BY RX/DR IN RCRD: CPT | Mod: CPTII,S$GLB,,

## 2023-06-22 PROCEDURE — 3288F FALL RISK ASSESSMENT DOCD: CPT | Mod: CPTII,S$GLB,,

## 2023-06-22 PROCEDURE — 1125F PR PAIN SEVERITY QUANTIFIED, PAIN PRESENT: ICD-10-PCS | Mod: CPTII,S$GLB,,

## 2023-06-22 PROCEDURE — 99214 OFFICE O/P EST MOD 30 MIN: CPT | Mod: S$GLB,,,

## 2023-06-22 PROCEDURE — 1159F MED LIST DOCD IN RCRD: CPT | Mod: CPTII,S$GLB,,

## 2023-06-22 PROCEDURE — 3077F PR MOST RECENT SYSTOLIC BLOOD PRESSURE >= 140 MM HG: ICD-10-PCS | Mod: CPTII,S$GLB,,

## 2023-06-22 PROCEDURE — 99999 PR PBB SHADOW E&M-EST. PATIENT-LVL V: ICD-10-PCS | Mod: PBBFAC,,,

## 2023-06-22 PROCEDURE — 4010F PR ACE/ARB THEARPY RXD/TAKEN: ICD-10-PCS | Mod: CPTII,S$GLB,,

## 2023-06-22 PROCEDURE — 1160F PR REVIEW ALL MEDS BY PRESCRIBER/CLIN PHARMACIST DOCUMENTED: ICD-10-PCS | Mod: CPTII,S$GLB,,

## 2023-06-22 PROCEDURE — 3078F DIAST BP <80 MM HG: CPT | Mod: CPTII,S$GLB,,

## 2023-06-22 PROCEDURE — 99214 PR OFFICE/OUTPT VISIT, EST, LEVL IV, 30-39 MIN: ICD-10-PCS | Mod: S$GLB,,,

## 2023-06-22 PROCEDURE — 1125F AMNT PAIN NOTED PAIN PRSNT: CPT | Mod: CPTII,S$GLB,,

## 2023-06-22 PROCEDURE — 3008F PR BODY MASS INDEX (BMI) DOCUMENTED: ICD-10-PCS | Mod: CPTII,S$GLB,,

## 2023-06-22 PROCEDURE — 1101F PR PT FALLS ASSESS DOC 0-1 FALLS W/OUT INJ PAST YR: ICD-10-PCS | Mod: CPTII,S$GLB,,

## 2023-06-22 NOTE — PATIENT INSTRUCTIONS
Leg swelling: elevate legs, compression stockings, continue low sodium diet.     Labs and urine today.

## 2023-06-22 NOTE — TELEPHONE ENCOUNTER
----- Message from Nat Salas MA sent at 6/22/2023  3:04 PM CDT -----  Please advise.  Thank you  ----- Message -----  From: Kat Gordon NP  Sent: 6/22/2023   1:12 PM CDT  To: Jacky Barajas Staff    Hey Dr. Rico, pt stopped taking her Valsartan 160mg because she thinks it was causing her pruritis. I told her I would reach out to you for an alternative. She's been having worsening kidney function so would benefit from an ACE/ARB.   What are your thoughts. Thanks! ESCOBAR Rider

## 2023-06-22 NOTE — Clinical Note
Najma Rico, pt stopped taking her Valsartan 160mg because she thinks it was causing her pruritis. I told her I would reach out to you for an alternative. She's been having worsening kidney function so would benefit from an ACE/ARB.   What are your thoughts. Thanks! Kat Gordon, ESCOBAR

## 2023-06-22 NOTE — PROGRESS NOTES
HPI     Chief Complaint:  Chief Complaint   Patient presents with    Leg Swelling     At night it gets bigger both legs but left worst        Lorena Contreras is a 72 y.o. female with multiple medical diagnoses as listed in the medical history and problem list that presents for leg swelling.  Pt is new to me but seen in clinic with last appointment 4/20/2023.      HPI    Leg swelling: Recently discharged from hospital approx 2 months ago but has been having worsening leg swelling and is concerns. Minimal swelling noted to ankles in clinic but pt reports swelling can become very very, below the knees mainly to lower legs. Not present when she wakes up in the morning. Improves with elevation. Denies pain, SOB, OSMAN, or orthopnea. Sleeps in hospital bed. Lives at home alone,  passed. Use to eat a lot of TV dinner btu stopped since hospital admit. Now cooking for self without additional salt. States if she uses salt she will use small packet from restaurant. Pt was on Lasix but was stopped by Dr. Paris per pt.   CKD: Recent kidney function worsening per pt. Also concerns for anemia. No blood in stool or bleeding. Consider nephrology referral.   HF: managed by Dr. Rico. Pt states she stopped taking Valsartan 160mg daily because it was making her itch from the inside/out. Pt would like alternative, concerned because she has history of anaphylaxis. Will discuss mgmt with Dr. Rico.   Needs to f/u with endo for DM mgmt.       Other concerns addressed below in A&P    Assessment & Plan       Problem List Items Addressed This Visit          Cardiac/Vascular    Chronic diastolic heart failure  Needs to continue to f/u with Dr. Rico. The current medical regimen is effective;  continue present plan and medications. Denies CP, SOB, orthopnea.    Overview     Noted on echo 11/18/2019:  Grade II (moderate) left ventricular diastolic dysfunction consistent with pseudonormalization            Relevant Orders     BASIC METABOLIC PANEL    CBC Auto Differential    IRON AND TIBC    TRANSFERRIN       Renal/    Stage 3a chronic kidney disease  Labs today.  Consider nephrology referral if kidney function continue to worsen. Pt stopped her Valsartan. Would benefit from ACE/ARB. Routed message to Dr. Rico to discuss since he initially proscribed.      Other Visit Diagnoses       Leg swelling    -  Primary  -Elevate legs, compression stockings, continue low sodium diet. Discussed common AE of amlodipine but discussed risks vs benefits and importance. Labs today.     Uncontrolled type 2 diabetes mellitus with hyperglycemia, with long-term current use of insulin      Needs to f/u with endo. Pt stopped many of her DM meds.. Unsure of compliant.   Lab Results   Component Value Date    HGBA1C 13.9 (H) 04/10/2023       Relevant Orders    Ambulatory referral/consult to Endocrinology            --------------------------------------------      Health Maintenance:  Health Maintenance         Date Due Completion Date    COVID-19 Vaccine (1) Never done ---    Shingles Vaccine (1 of 2) Never done ---    Colorectal Cancer Screening Never done ---    DEXA Scan 12/08/2018 12/8/2016    Mammogram 03/26/2019 3/26/2018    Eye Exam 03/12/2022 3/12/2021    Diabetes Urine Screening 02/11/2023 2/11/2022    Hemoglobin A1c 07/10/2023 4/10/2023    Influenza Vaccine (Season Ended) 09/01/2023 ---    Foot Exam 10/20/2023 10/20/2022    Lipid Panel 04/10/2024 4/10/2023    TETANUS VACCINE 05/28/2031 5/28/2021              Follow Up:  Follow up in about 3 months (around 9/22/2023), or if symptoms worsen or fail to improve.  Discussed DDx, condition, and treatment.   Education sent to patient portal/included in after visit summary.  ED precautions given.   Notify provider if symptoms do not resolve or increase in severity.   Patient verbalizes understanding and agrees with plan of care.      Exam     Review of Systems:  (as noted above)  Review of Systems    Constitutional:  Positive for unexpected weight change. Negative for activity change, appetite change, fatigue and fever.   HENT:  Negative for trouble swallowing.    Eyes:  Negative for visual disturbance.   Respiratory:  Negative for choking, chest tightness, shortness of breath, wheezing and stridor.    Cardiovascular:  Positive for leg swelling. Negative for chest pain and palpitations.   Gastrointestinal:  Positive for nausea. Negative for blood in stool.   Skin:  Positive for rash (bitaleral legs).   Neurological:  Negative for dizziness and light-headedness.   Hematological:  Negative for adenopathy. Bruises/bleeds easily.   Psychiatric/Behavioral:  Negative for agitation.      Physical Exam:   Physical Exam  Constitutional:       General: She is not in acute distress.     Appearance: Normal appearance. She is not toxic-appearing.   HENT:      Head: Normocephalic and atraumatic.   Eyes:      General: Scleral icterus present.   Cardiovascular:      Rate and Rhythm: Normal rate and regular rhythm.      Pulses:           Dorsalis pedis pulses are 2+ on the right side and 2+ on the left side.        Posterior tibial pulses are 2+ on the right side.      Heart sounds: Murmur heard.   Systolic murmur is present.      Comments: Very mild swelling to bilateral ankles  Pulmonary:      Effort: Pulmonary effort is normal. No respiratory distress.      Breath sounds: Normal breath sounds.   Abdominal:      General: Abdomen is flat. Bowel sounds are normal.      Palpations: Abdomen is soft.   Musculoskeletal:         General: Normal range of motion.      Cervical back: Normal range of motion.      Right lower le+ Edema present.      Left lower le+ Edema present.   Skin:     Capillary Refill: Capillary refill takes less than 2 seconds.   Neurological:      General: No focal deficit present.      Mental Status: She is alert.   Psychiatric:         Mood and Affect: Mood is anxious.         Behavior: Behavior  "normal.     Vitals:    06/22/23 1105   BP: (!) 140/60   Pulse: 72   Temp: 98.2 °F (36.8 °C)   TempSrc: Oral   SpO2: 99%   Weight: 78.2 kg (172 lb 6.4 oz)   Height: 5' 7" (1.702 m)      Body mass index is 27 kg/m².        History     Past Medical History:  Past Medical History:   Diagnosis Date    Allergy     Altered mental status 06/19/2022    DYSARTHRIA, SPASTIC MOVEMENTS & DIFFICULTY SWALLOWING    Anemia     Anxiety     Arthritis     Cataract     both removed    Colon polyps     Coronary artery disease     Depression     Diabetes mellitus, type II     Disorder of kidney and ureter     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/2023    Fibromyalgia     Follicular lymphoma     GERD (gastroesophageal reflux disease)     HTN (hypertension)     Hyperlipidemia     MI (myocardial infarction) 03/2019    Personal history of colonic polyps     Restless leg syndrome     Stroke        Past Surgical History:  Past Surgical History:   Procedure Laterality Date    COLONOSCOPY  11/07/2012    Colon polyp found; repeat in 5 years    ELBOW SURGERY Right 2015    dislocation repair     ESOPHAGOGASTRODUODENOSCOPY  11/07/2012    atrophic gastritis, H pylori testing negative    INCISION AND DRAINAGE FOOT Right 6/2/2021    Procedure: INCISION AND DRAINAGE, FOOT, bone biopsy;  Surgeon: Quiana Penn DPM;  Location: [facility] OR;  Service: Podiatry;  Laterality: Right;    KNEE SURGERY Bilateral 2015    scoped    LEFT HEART CATHETERIZATION Left 3/29/2019    Procedure: Left heart cath;  Surgeon: Bladimir Barbosa MD;  Location: [facility] CATH LAB;  Service: Cardiology;  Laterality: Left;    LEFT HEART CATHETERIZATION Left 11/18/2019    Procedure: Left heart cath;  Surgeon: Kalr Rico MD;  Location: [facility] CATH LAB;  Service: Cardiology;  Laterality: Left;    LEFT HEART CATHETERIZATION Left 1/8/2020    Procedure: Left heart cath, right radial, noon start;  Surgeon: Christos Monreal MD;  Location: [facility] CATH LAB;  Service: Cardiology;  Laterality: Left;  RN " Pre Op 1-6-20.  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  2 dtr.  Lives with .  3 cats and a dog.  Retired.  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  Lives in house, 1 story and 4 steps up and has a ramp.      Enjoys crafting.  Unable to bowl due to myalgias.         Family History:  Family History   Problem Relation Age of Onset    Cancer Mother         colon    Heart disease Mother     Cancer Father         lung    Lung cancer Brother     Diabetes Sister     Hypertension Sister     Allergy (severe) Daughter     No Known Problems Daughter     Stroke Neg Hx     Hyperlipidemia Neg Hx        Allergies and Medications: (updated and reviewed)  Review of patient's allergies indicates:   Allergen Reactions    Novolin 70/30 (semi-synthetic) Nausea And Vomiting     Severe vomiting on Relion 70/30    Sulfa (sulfonamide antibiotics) Anaphylaxis    Talwin [pentazocine lactate] Anaphylaxis    Victoza [liraglutide] Nausea And Vomiting    Glipizide Nausea Only    Citric acid     Codeine     Influenza virus vaccines Hives    Iodine and iodide containing products Hives    Levetiracetam Itching    Rituxan [rituximab] Hives    Zoloft [sertraline] Nausea And Vomiting     Current Outpatient Medications   Medication Sig Dispense Refill    amLODIPine (NORVASC) 10 MG tablet TAKE 1 TABLET EVERY DAY 90 tablet 3     ASCORBIC ACID, VITAMIN C, ORAL Take by mouth once daily.      aspirin 81 MG Chew Take 1 tablet (81 mg total) by mouth once daily. 90 tablet 3    atenoloL (TENORMIN) 50 MG tablet Take 1 tablet (50 mg total) by mouth 2 (two) times daily. 180 tablet 3    atorvastatin (LIPITOR) 80 MG tablet Take 1 tablet (80 mg total) by mouth every evening. 90 tablet 3    blood sugar diagnostic (FREESTYLE TEST) Strp 1 strip by Misc.(Non-Drug; Combo Route) route 4 (four) times daily. 400 each 4    BRILINTA 90 mg tablet Take 1 tablet by mouth twice daily 180 tablet 0    cholecalciferol, vitamin D3, (VITAMIN D3) 50 mcg (2,000 unit) Cap Take 2 capsules by mouth 2 (two) times daily.      cyanocobalamin (VITAMIN B-12) 1000 MCG tablet Take 100 mcg by mouth once daily.      diclofenac sodium (VOLTAREN TOP) Apply topically.      ESOMEPRAZOLE MAGNESIUM ORAL Take by mouth as needed.      ferrous sulfate 325 (65 FE) MG EC tablet Take 1 tablet (325 mg total) by mouth once daily. 30 tablet 11    FLUoxetine 40 MG capsule Take 1 capsule by mouth once daily 90 capsule 0    insulin aspart U-100 (NOVOLOG FLEXPEN U-100 INSULIN) 100 unit/mL (3 mL) InPn pen Use with sliding scale before meals: 150-200=+2, 201-250=+4; 251-300=+6; 301-350=+8, over 350=+10 units 15 mL 0    isosorbide mononitrate (IMDUR) 30 MG 24 hr tablet Take 1 tablet by mouth once daily 90 tablet 0    magnesium oxide (MAG-OX) 400 mg tablet Take 400 mg by mouth once daily.      melatonin 10 mg Cap Take 10 mg by mouth nightly.      metoprolol succinate (TOPROL-XL) 50 MG 24 hr tablet Take 1 tablet by mouth once daily 90 tablet 3    multivitamin/iron/folic acid (CENTRUM COMPLETE ORAL) Take by mouth.      pantoprazole (PROTONIX) 40 MG tablet Take 1 tablet by mouth once daily 90 tablet 0    vitamin E 1000 UNIT capsule Take 1,000 Units by mouth once daily.      acetaminophen (TYLENOL) 500 MG tablet Take 1 tablet (500 mg total) by mouth every 4 (four) hours as needed for Pain. (Patient not taking:  "Reported on 6/22/2023)  0    Bacillus coagulans (DIGESTIVE ADVANTAGE IMMUNE ORAL) Take by mouth.      dulaglutide (TRULICITY) 4.5 mg/0.5 mL pen injector Inject 4.5 mg into the skin every 7 days. (Patient not taking: Reported on 4/28/2023) 4 pen 3    EPINEPHrine (EPIPEN) 0.3 mg/0.3 mL AtIn Inject 0.3 mLs (0.3 mg total) into the muscle once. for 1 dose 0.3 mL 1    glimepiride (AMARYL) 2 MG tablet Take 1 tablet (2 mg total) by mouth before breakfast. 90 tablet 0    insulin degludec (TRESIBA FLEXTOUCH U-200) 200 unit/mL (3 mL) insulin pen Inject 22 units once daily and titrate to 40 units (Patient not taking: Reported on 6/22/2023) 3 pen 1    lancets 32 gauge Misc 1 lancet by Misc.(Non-Drug; Combo Route) route 4 (four) times daily. 360 each 3    pen needle, diabetic (BD ULTRA-FINE SAGAR PEN NEEDLE) 32 gauge x 5/32" Ndle USE WITH NOVOLOG MIX FLEXPENS 400 each 3    TRUEPLUS LANCETS 30 gauge Misc        No current facility-administered medications for this visit.       Patient Care Team:  Jorge Paris MD as PCP - General (Internal Medicine)  Javier Reilly OD as Consulting Physician (Optometry)  Aidne Zee OD as Consulting Physician (Ophthalmology)  Mary Bojorquez III, MD as Consulting Physician (Orthopedic Surgery)  [facility] (DME Provider)  Haritha Barbour MA as Care Coordinator         - The patient is given an After Visit Summary that lists all medications with directions, allergies, education, orders placed during this encounter and follow-up instructions.      - I have reviewed the patient's medical information including past medical, family, and social history sections including the medications and allergies.      - We discussed the patient's current medications.     This note was created by combination of typed  and MModal dictation.  Transcription errors may be present.  If there are any questions, please contact me.            "

## 2023-06-23 ENCOUNTER — PATIENT MESSAGE (OUTPATIENT)
Dept: FAMILY MEDICINE | Facility: CLINIC | Age: 73
End: 2023-06-23
Payer: MEDICARE

## 2023-06-23 ENCOUNTER — TELEPHONE (OUTPATIENT)
Dept: FAMILY MEDICINE | Facility: CLINIC | Age: 73
End: 2023-06-23
Payer: MEDICARE

## 2023-06-23 DIAGNOSIS — Z79.4 UNCONTROLLED TYPE 2 DIABETES MELLITUS WITH HYPERGLYCEMIA, WITH LONG-TERM CURRENT USE OF INSULIN: Primary | ICD-10-CM

## 2023-06-23 DIAGNOSIS — N18.32 STAGE 3B CHRONIC KIDNEY DISEASE: ICD-10-CM

## 2023-06-23 DIAGNOSIS — E11.42 DIABETIC PERIPHERAL NEUROPATHY ASSOCIATED WITH TYPE 2 DIABETES MELLITUS: Chronic | ICD-10-CM

## 2023-06-23 DIAGNOSIS — E11.65 UNCONTROLLED TYPE 2 DIABETES MELLITUS WITH HYPERGLYCEMIA, WITH LONG-TERM CURRENT USE OF INSULIN: Primary | ICD-10-CM

## 2023-06-23 DIAGNOSIS — E11.00 UNCONTROLLED TYPE 2 DIABETES MELLITUS WITH HYPEROSMOLAR NONKETOTIC HYPERGLYCEMIA: ICD-10-CM

## 2023-06-23 RX ORDER — INSULIN ASPART 100 [IU]/ML
INJECTION, SOLUTION INTRAVENOUS; SUBCUTANEOUS
Qty: 15 ML | Refills: 3 | Status: SHIPPED | OUTPATIENT
Start: 2023-06-23 | End: 2023-06-30

## 2023-06-23 RX ORDER — INSULIN ASPART 100 [IU]/ML
INJECTION, SOLUTION INTRAVENOUS; SUBCUTANEOUS
Qty: 15 ML | Refills: 0 | Status: CANCELLED | OUTPATIENT
Start: 2023-06-23

## 2023-06-23 RX ORDER — PEN NEEDLE, DIABETIC 30 GX3/16"
NEEDLE, DISPOSABLE MISCELLANEOUS
Qty: 400 EACH | Refills: 3 | Status: CANCELLED | OUTPATIENT
Start: 2023-06-23

## 2023-06-23 RX ORDER — PEN NEEDLE, DIABETIC 30 GX3/16"
NEEDLE, DISPOSABLE MISCELLANEOUS
Qty: 400 EACH | Refills: 3 | Status: SHIPPED | OUTPATIENT
Start: 2023-06-23 | End: 2023-08-24 | Stop reason: SDUPTHER

## 2023-06-23 RX ORDER — DULAGLUTIDE 1.5 MG/.5ML
1.5 INJECTION, SOLUTION SUBCUTANEOUS
Qty: 4 PEN | Refills: 3 | Status: SHIPPED | OUTPATIENT
Start: 2023-06-23 | End: 2023-09-15

## 2023-06-23 NOTE — TELEPHONE ENCOUNTER
S/w pt about labs and necessity to take medication.     BG currently 500, just took 30 units of insulin but only takes daily not with meals. Uses sliding scale. Pt states she feels good. States she doesn't feel well when her BG is in the 100s.     Denies SOB. Denies polyuria/polydipsia or confusion. Always fatigued.      States she can't take tresiba or glimepiride.     Does tolerate trulicity but only the 1.5, cannot tolerate the 4.5.     Pt agrees she will take novolog tid with meals using sliding scale and will restart Trulicity 1.5. Has appt with domingo in August. Needs to schedule appt with nephrology. Agrees.     Agrees to continue care/treatment with Dr. Paris. Needs appt within 1 month for f/u care and strict mgmt.

## 2023-06-23 NOTE — TELEPHONE ENCOUNTER
----- Message from Sindi Esteban sent at 6/23/2023 11:33 AM CDT -----  Regarding: Returning Call  .Type:  Patient Returning Call    Who Called: Self     Who Left Message for Patient: Kat Gordon    Does the patient know what this is regarding?: Test Results    Would the patient rather a call back or a response via [facility]? Call back    Best Call Back Number: [phone]     Additional Information:

## 2023-06-23 NOTE — TELEPHONE ENCOUNTER
----- Message from Kat Gordon NP sent at 6/23/2023 10:43 AM CDT -----  Please contact the patient and let them know I'm worried about worsening labs. Glucose very elevated and kidney function worse. Pt needs to restart diabetes medication. We need clarification on what DM medication she is taking if any.     She needs to follow-up with endocrinology ASAP. I will place a referral for nephrology.

## 2023-06-26 ENCOUNTER — TELEPHONE (OUTPATIENT)
Dept: FAMILY MEDICINE | Facility: CLINIC | Age: 73
End: 2023-06-26
Payer: MEDICARE

## 2023-06-30 DIAGNOSIS — Z79.4 UNCONTROLLED TYPE 2 DIABETES MELLITUS WITH HYPERGLYCEMIA, WITH LONG-TERM CURRENT USE OF INSULIN: ICD-10-CM

## 2023-06-30 DIAGNOSIS — E11.00 UNCONTROLLED TYPE 2 DIABETES MELLITUS WITH HYPEROSMOLAR NONKETOTIC HYPERGLYCEMIA: ICD-10-CM

## 2023-06-30 DIAGNOSIS — E11.65 UNCONTROLLED TYPE 2 DIABETES MELLITUS WITH HYPERGLYCEMIA, WITH LONG-TERM CURRENT USE OF INSULIN: ICD-10-CM

## 2023-06-30 RX ORDER — LANCETS
EACH MISCELLANEOUS
Qty: 400 EACH | Refills: 0 | Status: SHIPPED | OUTPATIENT
Start: 2023-06-30 | End: 2023-11-03 | Stop reason: CLARIF

## 2023-06-30 RX ORDER — INSULIN PUMP SYRINGE, 3 ML
EACH MISCELLANEOUS
Qty: 1 EACH | Refills: 0 | Status: SHIPPED | OUTPATIENT
Start: 2023-06-30 | End: 2023-11-03 | Stop reason: CLARIF

## 2023-06-30 RX ORDER — INSULIN ASPART 100 [IU]/ML
INJECTION, SOLUTION INTRAVENOUS; SUBCUTANEOUS
Qty: 15 ML | Refills: 3 | Status: SHIPPED | OUTPATIENT
Start: 2023-06-30 | End: 2023-08-24 | Stop reason: SDUPTHER

## 2023-06-30 NOTE — TELEPHONE ENCOUNTER
No care due was identified.  [facility] Embedded Care Due Messages. Reference number: [id number].   6/30/2023 9:52:51 AM CDT

## 2023-07-07 NOTE — TELEPHONE ENCOUNTER
-started on potassium bicarb 50 mEq  -potassium repletion being managed per primary team     [facility] fax over a form saying that they have approved her V-GO 20 kit from 2-17-17 to 12-31-17.

## 2023-07-21 ENCOUNTER — PES CALL (OUTPATIENT)
Dept: ADMINISTRATIVE | Facility: CLINIC | Age: 73
End: 2023-07-21
Payer: MEDICARE

## 2023-07-26 DIAGNOSIS — Z78.0 MENOPAUSE: ICD-10-CM

## 2023-07-31 ENCOUNTER — PATIENT MESSAGE (OUTPATIENT)
Dept: ADMINISTRATIVE | Facility: HOSPITAL | Age: 73
End: 2023-07-31
Payer: MEDICARE

## 2023-08-10 ENCOUNTER — TELEPHONE (OUTPATIENT)
Dept: CARDIOLOGY | Facility: CLINIC | Age: 73
End: 2023-08-10
Payer: MEDICARE

## 2023-08-16 ENCOUNTER — OFFICE VISIT (OUTPATIENT)
Dept: CARDIOLOGY | Facility: CLINIC | Age: 73
End: 2023-08-16
Payer: MEDICARE

## 2023-08-16 VITALS
DIASTOLIC BLOOD PRESSURE: 80 MMHG | SYSTOLIC BLOOD PRESSURE: 138 MMHG | WEIGHT: 174.19 LBS | HEART RATE: 81 BPM | OXYGEN SATURATION: 98 % | RESPIRATION RATE: 15 BRPM | BODY MASS INDEX: 27.34 KG/M2 | HEIGHT: 67 IN

## 2023-08-16 DIAGNOSIS — I51.7 CARDIOMEGALY: ICD-10-CM

## 2023-08-16 DIAGNOSIS — I15.2 HYPERTENSION ASSOCIATED WITH DIABETES: Primary | Chronic | ICD-10-CM

## 2023-08-16 DIAGNOSIS — I70.0 AORTIC ATHEROSCLEROSIS: ICD-10-CM

## 2023-08-16 DIAGNOSIS — I27.20 PULMONARY HYPERTENSION: ICD-10-CM

## 2023-08-16 DIAGNOSIS — E11.59 HYPERTENSION ASSOCIATED WITH DIABETES: Primary | Chronic | ICD-10-CM

## 2023-08-16 DIAGNOSIS — I50.32 CHRONIC DIASTOLIC HEART FAILURE: ICD-10-CM

## 2023-08-16 DIAGNOSIS — I25.118 CORONARY ARTERY DISEASE OF NATIVE ARTERY OF NATIVE HEART WITH STABLE ANGINA PECTORIS: ICD-10-CM

## 2023-08-16 PROCEDURE — 4010F ACE/ARB THERAPY RXD/TAKEN: CPT | Mod: HCNC,CPTII,S$GLB, | Performed by: INTERNAL MEDICINE

## 2023-08-16 PROCEDURE — 3288F FALL RISK ASSESSMENT DOCD: CPT | Mod: HCNC,CPTII,S$GLB, | Performed by: INTERNAL MEDICINE

## 2023-08-16 PROCEDURE — 1125F AMNT PAIN NOTED PAIN PRSNT: CPT | Mod: HCNC,CPTII,S$GLB, | Performed by: INTERNAL MEDICINE

## 2023-08-16 PROCEDURE — 99214 PR OFFICE/OUTPT VISIT, EST, LEVL IV, 30-39 MIN: ICD-10-PCS | Mod: HCNC,S$GLB,, | Performed by: INTERNAL MEDICINE

## 2023-08-16 PROCEDURE — 1159F MED LIST DOCD IN RCRD: CPT | Mod: HCNC,CPTII,S$GLB, | Performed by: INTERNAL MEDICINE

## 2023-08-16 PROCEDURE — 3079F PR MOST RECENT DIASTOLIC BLOOD PRESSURE 80-89 MM HG: ICD-10-PCS | Mod: HCNC,CPTII,S$GLB, | Performed by: INTERNAL MEDICINE

## 2023-08-16 PROCEDURE — 1101F PT FALLS ASSESS-DOCD LE1/YR: CPT | Mod: HCNC,CPTII,S$GLB, | Performed by: INTERNAL MEDICINE

## 2023-08-16 PROCEDURE — 3288F PR FALLS RISK ASSESSMENT DOCUMENTED: ICD-10-PCS | Mod: HCNC,CPTII,S$GLB, | Performed by: INTERNAL MEDICINE

## 2023-08-16 PROCEDURE — 93000 ELECTROCARDIOGRAM COMPLETE: CPT | Mod: HCNC,S$GLB,, | Performed by: INTERNAL MEDICINE

## 2023-08-16 PROCEDURE — 3008F PR BODY MASS INDEX (BMI) DOCUMENTED: ICD-10-PCS | Mod: HCNC,CPTII,S$GLB, | Performed by: INTERNAL MEDICINE

## 2023-08-16 PROCEDURE — 3062F POS MACROALBUMINURIA REV: CPT | Mod: HCNC,CPTII,S$GLB, | Performed by: INTERNAL MEDICINE

## 2023-08-16 PROCEDURE — 1101F PR PT FALLS ASSESS DOC 0-1 FALLS W/OUT INJ PAST YR: ICD-10-PCS | Mod: HCNC,CPTII,S$GLB, | Performed by: INTERNAL MEDICINE

## 2023-08-16 PROCEDURE — 3075F PR MOST RECENT SYSTOLIC BLOOD PRESS GE 130-139MM HG: ICD-10-PCS | Mod: HCNC,CPTII,S$GLB, | Performed by: INTERNAL MEDICINE

## 2023-08-16 PROCEDURE — 3079F DIAST BP 80-89 MM HG: CPT | Mod: HCNC,CPTII,S$GLB, | Performed by: INTERNAL MEDICINE

## 2023-08-16 PROCEDURE — 99999 PR PBB SHADOW E&M-EST. PATIENT-LVL III: ICD-10-PCS | Mod: PBBFAC,HCNC,, | Performed by: INTERNAL MEDICINE

## 2023-08-16 PROCEDURE — 1159F PR MEDICATION LIST DOCUMENTED IN MEDICAL RECORD: ICD-10-PCS | Mod: HCNC,CPTII,S$GLB, | Performed by: INTERNAL MEDICINE

## 2023-08-16 PROCEDURE — 3008F BODY MASS INDEX DOCD: CPT | Mod: HCNC,CPTII,S$GLB, | Performed by: INTERNAL MEDICINE

## 2023-08-16 PROCEDURE — 99999 PR PBB SHADOW E&M-EST. PATIENT-LVL III: CPT | Mod: PBBFAC,HCNC,, | Performed by: INTERNAL MEDICINE

## 2023-08-16 PROCEDURE — 1125F PR PAIN SEVERITY QUANTIFIED, PAIN PRESENT: ICD-10-PCS | Mod: HCNC,CPTII,S$GLB, | Performed by: INTERNAL MEDICINE

## 2023-08-16 PROCEDURE — 3066F PR DOCUMENTATION OF TREATMENT FOR NEPHROPATHY: ICD-10-PCS | Mod: HCNC,CPTII,S$GLB, | Performed by: INTERNAL MEDICINE

## 2023-08-16 PROCEDURE — 99499 UNLISTED E&M SERVICE: CPT | Mod: HCNC,S$GLB,, | Performed by: INTERNAL MEDICINE

## 2023-08-16 PROCEDURE — 99214 OFFICE O/P EST MOD 30 MIN: CPT | Mod: HCNC,S$GLB,, | Performed by: INTERNAL MEDICINE

## 2023-08-16 PROCEDURE — 99499 RISK ADDL DX/OHS AUDIT: ICD-10-PCS | Mod: HCNC,S$GLB,, | Performed by: INTERNAL MEDICINE

## 2023-08-16 PROCEDURE — 3046F HEMOGLOBIN A1C LEVEL >9.0%: CPT | Mod: HCNC,CPTII,S$GLB, | Performed by: INTERNAL MEDICINE

## 2023-08-16 PROCEDURE — 93000 EKG 12-LEAD: ICD-10-PCS | Mod: HCNC,S$GLB,, | Performed by: INTERNAL MEDICINE

## 2023-08-16 PROCEDURE — 4010F PR ACE/ARB THEARPY RXD/TAKEN: ICD-10-PCS | Mod: HCNC,CPTII,S$GLB, | Performed by: INTERNAL MEDICINE

## 2023-08-16 PROCEDURE — 3075F SYST BP GE 130 - 139MM HG: CPT | Mod: HCNC,CPTII,S$GLB, | Performed by: INTERNAL MEDICINE

## 2023-08-16 PROCEDURE — 3046F PR MOST RECENT HEMOGLOBIN A1C LEVEL > 9.0%: ICD-10-PCS | Mod: HCNC,CPTII,S$GLB, | Performed by: INTERNAL MEDICINE

## 2023-08-16 PROCEDURE — 3066F NEPHROPATHY DOC TX: CPT | Mod: HCNC,CPTII,S$GLB, | Performed by: INTERNAL MEDICINE

## 2023-08-16 PROCEDURE — 3062F PR POS MACROALBUMINURIA RESULT DOCUMENTED/REVIEW: ICD-10-PCS | Mod: HCNC,CPTII,S$GLB, | Performed by: INTERNAL MEDICINE

## 2023-08-16 RX ORDER — HYDRALAZINE HYDROCHLORIDE 50 MG/1
50 TABLET, FILM COATED ORAL 2 TIMES DAILY
Qty: 180 TABLET | Refills: 3 | Status: ON HOLD | OUTPATIENT
Start: 2023-08-16 | End: 2023-11-06 | Stop reason: SDUPTHER

## 2023-08-16 NOTE — PROGRESS NOTES
Subjective:   Patient ID:  Lorena Contreras is a 72 y.o. female who presents for follow-up of Follow-up      HPI    CAD - JESIKA x 2 to RCA 3/29/19 - JESIKA to RI and Cx 1/8/20, HTN, HLD, DM     11/18/19 [facility] - EDP 16, LAD mid 50%, Cx long mid 80-90% - diffusely diseased - similar to [facility] 3/29/19, OM1 90% mid, RCA stents patent 50% beyond stents     Will review with interventional staff for possible out patient PCI - continue with medical Rx for now     Previously followed by Dr Barbosa - last seen 5/16/19  Patient is here for follow-up of coronary artery disease and ST-elevation MI.  She underwent PCI to the RCA.  She is known to have other blockages well for which she re-presented to the emergency department was admitted for observation for atypical chest pain.  She felt like it was anxiety related and she no longer has had any since discharge.  She underwent nuclear stress test which showed no significant ischemia.  She denies any other associated symptoms currently.  She has experienced no PND, orthopnea or lower extremity edema.  She denies any dizziness, presyncope or syncope.  She says she was given Atarax on discharge which has helped with her anxiety.     Her follow-up coronary artery disease and previous ST-elevation MI.  She is feeling anxious and feels like she has a panic attack today.  She again is somewhat tearful and says that her  is been feeling ill as well.  She says she was started on Prozac by her primary care physician but does not feel like this is doing much in terms of calming her down.  She still has some mild residual chest pain when she mostly feels anxious.  This is dissimilar to her previous angina symptoms.  She denies any other associated symptoms.  She denies any PND, orthopnea or lower extremity edema.  She denies any dizziness, presyncope or syncope.      [facility] 1/8/20  1.  Successful PCI of proximal ramus with drug-eluting stent x1 (2.0 x 6 mm).  IVUS guidance was utilized for this  PCI.  Post PCI IVUS demonstrated well-opposed and expanded stent.  MADINA 3 flow pre and post PCI     2.  Successful PCI of circumflex with drug-eluting stent x1 (2.25 x 22 mm).  MADINA 3 flow pre and post PCI     [facility] 11/18/19  Patent RCA mid stents with 50% lesion after stents  Prox Cx to Dist Cx lesion , 95% stenosed.  Ost 1st Mrg to 1st Mrg lesion , 90% stenosed.  LVEDP (Pre): 16       [facility]: 3-19  Three vessel coronary artery disease.  Successful PCI for acute myocardial infarction of culprit RCA.  Prox RCA lesion , 99% stenosed reduced to 0%..  A STENT RESOLUTE CRISSY [id number] stent was successfully placed at 14 ROGER  Mid RCA lesion , 95% stenosed reduced to 0%..  A STENT RESOLUTE CRISSY 3.0X12MM stent was successfully placed at 12 ROGER  Prox Cx to Dist Cx lesion , 95% stenosed.  Ost 1st Mrg to 1st Mrg lesion , 90% stenosed.  Diffusely diseased LAD which is small vessel as well     Echo 6/20/22  The left ventricle is normal in size with mild concentric hypertrophy and hyperdynamic systolic function.  The estimated ejection fraction is 75%.  Normal right ventricular size with normal right ventricular systolic function.  There is mild aortic valve stenosis.  Aortic valve area is 1.59 cm2; peak velocity is 2.71 m/s; mean gradient is 19 mmHg.  There is moderate mitral stenosis.  The mean diastolic gradient across the mitral valve is 8 mmHg at a heart rate of 90 bpm.  The estimated PA systolic pressure is 58 mmHg.  There is pulmonary hypertension.     Stress test 12/1/22    Normal myocardial perfusion scan. There is no evidence of myocardial ischemia or infarction.    The gated perfusion images showed an ejection fraction of 71% post stress.    The EKG portion of this study is negative for ischemia.    The patient reported no chest pain during the stress test.    There were no arrhythmias during stress.     Holter 7/17/19  Sinus rhythm with heart rates varying between 82 and 136 bpm with an average of 96 bpm.  There were very  rare PVCs totalling 21 and averaging 0.44 per hour.  There were very rare PACs totalling 30 and averaging 0.63 per hour.  The diary was returned blank.     Carotid US 5/6/29  There is 20-39% right Internal Carotid Stenosis.  There is 0-19% left Internal Carotid Stenosis.      LE arterial doppler 5/6/19  Hemodynamically significant greater than 70% lesion in the L SFA proximally  Moderate greater than 50% plaque noted in the right SFA  Noncompressible CLEMENT bilaterally     7/3/19 HR has been running in th 100-130 ranges even at rest at rehab  Has on atenolol previously which was switched to metoprolol after MI  Denies significant CP or SOB   sinus tachycardia - old IMI  Increase toprol XL 50 qd  Holter  OV 1 week     9/4/19 Says metoprolol gives her diarrhea - wants to go back to atenolol  Denies CP or SOB  Mild stable claudication     Admitted 11/17/19  Lorena Blackmonhannah 69 y.o. female with CAD, HTN, HLD, DM2 presents to the hospital with a chief complaint of chest pain. She reports today at 11am she developed sternal chest pain without radiation that was constant until relieved with nitro in the ED. She states it was not as severe as previous chest pain when she had her heart attack. She is pain free now. She does not experience exertional chest pain and finds she is able to mow her yard without pain. She does not need her nitro PRN at home. She denies fever, SOB, N/V, abdominal pain, dysuria, dizziness, syncope, hemoptysis.      Lorena Brownestrellahannah 69 y.o. female placed in observation for chest pain. In the ED, EKG without acute ischemia, troponin negative, chest x-ray without acute process. Cardiology consulted. On 11/18/19,no chest pain overnight. LHC via Right fem art  showed :EDP 16, LAD mid 50%, Cx long mid 80-90% - diffusely diseased - similar to [facility] 3/29/19, OM1 90% mid, RCA stents patent 50% beyond stents. Post procedure, no complications and HDS. Added Imdur and continue to Brilinta/statin.  Allergy to ASA. Consider add ACEI/ARBS if blood pressure tolerates. Follow up with Dr. Rico 19 Stopped imdur - worrying about reaction with hives several days after LHC  Still with angina during emotional or physical stress   Doubt she is allergic to imdur - would restart  Increase atenolol 50 bid  Will refer to Dr Monreal to review C for possible PCI  OV with me 3 months     21  recently  - liver failure. Reports stable exertional CP - rarely needs NTG  EKG NSR NSSTT changes  OV 3 months with echo and lexiscan myoview for CP, CAD  Continue Rx for HTN, CAD, HLD      Went to the ER 22  Lorena Contreras is a 71 y.o. female, with a PMHx of CAD, HTN, HLD, DM, Anemia, who presents to the ED with central chest pain onset yesterday. Pt reports chest pain has been constant and is exacerbated by anxiety. Associated symptoms of nausea, diarrhea, LE edema, numbness in all fingers (onset this evening) and numbness in feet (chronic). Pt currently does not feel chest pain. No other exacerbating or alleviating factors. Patient denies cough, fever, chills, SOB, visual disturbance, paresthesia, dysuria, or other associated symptoms. This is the extent of the patient's complaints today in the Emergency Department.       22 Reports left sided CP where her port-a-cath was - feels like a pulling sensation - different from previous angina  BP poorly controlled - admits to eating too much salt  Add diovan 160 qd for HTN  Lexiscan myoview for CP  Echo with EF 75% - mild AS/MS  BMP, BNP  Continue Rx for HTN, CAD, HLD     22 CP has resolved. Denies SOb  Labs not done  BP controlled  Stress test negative for ischemia. Needs labs done    Continue Rx for HTN, CAD, HLD, DM  OV 3 months    Labs 23  K 5.2  Cr 1.5 up from 1.2    23 Diovan stopped due to itching - symptoms resolved. Now having LE edema in the evening and intermittent chest tightness - but able to work in the yard  without symptoms  Diuretic stopped by PCP after recent elevated Cr  EKG NSR old IMI    Review of Systems   Constitutional: Negative for decreased appetite.   HENT:  Negative for ear discharge.    Eyes:  Negative for blurred vision.   Respiratory:  Negative for hemoptysis.    Endocrine: Negative for polyphagia.   Hematologic/Lymphatic: Negative for adenopathy.   Skin:  Negative for color change.   Musculoskeletal:  Negative for joint swelling.   Genitourinary:  Negative for bladder incontinence.   Neurological:  Negative for brief paralysis.   Psychiatric/Behavioral:  Negative for hallucinations.    Allergic/Immunologic: Negative for hives.       Objective:   Physical Exam  Constitutional:       Appearance: She is well-developed.   HENT:      Head: Normocephalic and atraumatic.   Eyes:      Conjunctiva/sclera: Conjunctivae normal.      Pupils: Pupils are equal, round, and reactive to light.   Neck:      Thyroid: No thyromegaly.   Cardiovascular:      Rate and Rhythm: Normal rate and regular rhythm.      Heart sounds: No murmur heard.  Pulmonary:      Effort: Pulmonary effort is normal. No respiratory distress.      Breath sounds: Normal breath sounds.   Abdominal:      General: Bowel sounds are normal.      Palpations: Abdomen is soft.   Musculoskeletal:      Cervical back: Normal range of motion and neck supple.      Right lower leg: Edema present.      Left lower leg: Edema present.   Skin:     General: Skin is warm and dry.   Neurological:      Mental Status: She is alert and oriented to person, place, and time.   Psychiatric:         Behavior: Behavior normal.         Assessment:      1. Hypertension associated with diabetes    2. Coronary artery disease of native artery of native heart with stable angina pectoris    3. Chronic diastolic heart failure    4. Cardiomegaly    5. Aortic atherosclerosis    6. Pulmonary hypertension -- echo 11/2019        Plan:     Suspect LE edema is from norvasc and not CHF  Stop  norvasc - start hydralazine 50 bid  Check echo for AS and recent LE edema  BNP, BMP    Continue Rx for HTN, CAD, HLD, DM

## 2023-08-18 ENCOUNTER — LAB VISIT (OUTPATIENT)
Dept: LAB | Facility: HOSPITAL | Age: 73
End: 2023-08-18
Attending: INTERNAL MEDICINE
Payer: MEDICARE

## 2023-08-18 DIAGNOSIS — E11.59 HYPERTENSION ASSOCIATED WITH DIABETES: Chronic | ICD-10-CM

## 2023-08-18 DIAGNOSIS — I25.118 CORONARY ARTERY DISEASE OF NATIVE ARTERY OF NATIVE HEART WITH STABLE ANGINA PECTORIS: ICD-10-CM

## 2023-08-18 DIAGNOSIS — I50.32 CHRONIC DIASTOLIC HEART FAILURE: ICD-10-CM

## 2023-08-18 DIAGNOSIS — I70.0 AORTIC ATHEROSCLEROSIS: ICD-10-CM

## 2023-08-18 DIAGNOSIS — I51.7 CARDIOMEGALY: ICD-10-CM

## 2023-08-18 DIAGNOSIS — I15.2 HYPERTENSION ASSOCIATED WITH DIABETES: Chronic | ICD-10-CM

## 2023-08-18 DIAGNOSIS — I27.20 PULMONARY HYPERTENSION: ICD-10-CM

## 2023-08-18 LAB
ANION GAP SERPL CALC-SCNC: 9 MMOL/L (ref 8–16)
BNP SERPL-MCNC: 195 PG/ML (ref 0–99)
BUN SERPL-MCNC: 35 MG/DL (ref 8–23)
CALCIUM SERPL-MCNC: 8.9 MG/DL (ref 8.7–10.5)
CHLORIDE SERPL-SCNC: 106 MMOL/L (ref 95–110)
CO2 SERPL-SCNC: 22 MMOL/L (ref 23–29)
CREAT SERPL-MCNC: 1.2 MG/DL (ref 0.5–1.4)
EST. GFR  (NO RACE VARIABLE): 48 ML/MIN/1.73 M^2
GLUCOSE SERPL-MCNC: 203 MG/DL (ref 70–110)
POTASSIUM SERPL-SCNC: 5 MMOL/L (ref 3.5–5.1)
SODIUM SERPL-SCNC: 137 MMOL/L (ref 136–145)

## 2023-08-18 PROCEDURE — 80048 BASIC METABOLIC PNL TOTAL CA: CPT | Mod: HCNC | Performed by: INTERNAL MEDICINE

## 2023-08-18 PROCEDURE — 83880 ASSAY OF NATRIURETIC PEPTIDE: CPT | Mod: HCNC | Performed by: INTERNAL MEDICINE

## 2023-08-18 PROCEDURE — 36415 COLL VENOUS BLD VENIPUNCTURE: CPT | Mod: HCNC | Performed by: INTERNAL MEDICINE

## 2023-08-22 ENCOUNTER — HOSPITAL ENCOUNTER (OUTPATIENT)
Dept: CARDIOLOGY | Facility: HOSPITAL | Age: 73
Discharge: HOME | End: 2023-08-22
Attending: INTERNAL MEDICINE
Payer: MEDICARE

## 2023-08-22 VITALS — WEIGHT: 174 LBS | HEIGHT: 67 IN | BODY MASS INDEX: 27.31 KG/M2

## 2023-08-22 DIAGNOSIS — I15.2 HYPERTENSION ASSOCIATED WITH DIABETES: Chronic | ICD-10-CM

## 2023-08-22 DIAGNOSIS — E11.59 HYPERTENSION ASSOCIATED WITH DIABETES: Chronic | ICD-10-CM

## 2023-08-22 DIAGNOSIS — I50.32 CHRONIC DIASTOLIC HEART FAILURE: ICD-10-CM

## 2023-08-22 DIAGNOSIS — I25.118 CORONARY ARTERY DISEASE OF NATIVE ARTERY OF NATIVE HEART WITH STABLE ANGINA PECTORIS: ICD-10-CM

## 2023-08-22 DIAGNOSIS — I27.20 PULMONARY HYPERTENSION: ICD-10-CM

## 2023-08-22 DIAGNOSIS — I51.7 CARDIOMEGALY: ICD-10-CM

## 2023-08-22 DIAGNOSIS — I70.0 AORTIC ATHEROSCLEROSIS: ICD-10-CM

## 2023-08-22 LAB
AORTIC ROOT ANNULUS: 1.59 CM
AORTIC VALVE CUSP SEPERATION: 0.68 CM
ASCENDING AORTA: 2.25 CM
AV INDEX (PROSTH): 0.29
AV MEAN GRADIENT: 29 MMHG
AV PEAK GRADIENT: 46 MMHG
AV VALVE AREA BY VELOCITY RATIO: 0.82 CM²
AV VALVE AREA: 1.01 CM²
AV VELOCITY RATIO: 0.24
BSA FOR ECHO PROCEDURE: 1.93 M2
CV ECHO LV RWT: 0.6 CM
DOP CALC AO PEAK VEL: 3.4 M/S
DOP CALC AO VTI: 82.4 CM
DOP CALC LVOT AREA: 3.5 CM2
DOP CALC LVOT DIAMETER: 2.1 CM
DOP CALC LVOT PEAK VEL: 0.81 M/S
DOP CALC LVOT STROKE VOLUME: 83.43 CM3
DOP CALC MV VTI: 52.3 CM
DOP CALCLVOT PEAK VEL VTI: 24.1 CM
E WAVE DECELERATION TIME: 154.1 MSEC
E/A RATIO: 1.78
E/E' RATIO: 28.5 M/S
ECHO LV POSTERIOR WALL: 1.24 CM (ref 0.6–1.1)
FRACTIONAL SHORTENING: 31 % (ref 28–44)
INTERVENTRICULAR SEPTUM: 1.25 CM (ref 0.6–1.1)
IVC DIAMETER: 1.48 CM
LA MAJOR: 6.9 CM
LA MINOR: 5.44 CM
LA WIDTH: 5.4 CM
LEFT ATRIUM SIZE: 5.78 CM
LEFT ATRIUM VOLUME INDEX: 84.5 ML/M2
LEFT ATRIUM VOLUME: 161.4 CM3
LEFT INTERNAL DIMENSION IN SYSTOLE: 2.84 CM (ref 2.1–4)
LEFT VENTRICLE DIASTOLIC VOLUME INDEX: 38.79 ML/M2
LEFT VENTRICLE DIASTOLIC VOLUME: 74.08 ML
LEFT VENTRICLE MASS INDEX: 95 G/M2
LEFT VENTRICLE SYSTOLIC VOLUME INDEX: 16 ML/M2
LEFT VENTRICLE SYSTOLIC VOLUME: 30.57 ML
LEFT VENTRICULAR INTERNAL DIMENSION IN DIASTOLE: 4.1 CM (ref 3.5–6)
LEFT VENTRICULAR MASS: 181.37 G
LV LATERAL E/E' RATIO: 24.43 M/S
LV SEPTAL E/E' RATIO: 34.2 M/S
LVOT MG: 1.58 MMHG
LVOT MV: 0.59 CM/S
MV MEAN GRADIENT: 5 MMHG
MV PEAK A VEL: 0.96 M/S
MV PEAK E VEL: 1.71 M/S
MV PEAK GRADIENT: 16 MMHG
MV STENOSIS PRESSURE HALF TIME: 55.63 MS
MV VALVE AREA BY CONTINUITY EQUATION: 1.6 CM2
MV VALVE AREA P 1/2 METHOD: 3.95 CM2
PISA MRMAX VEL: 5.65 M/S
PISA TR MAX VEL: 3.27 M/S
PULM VEIN S/D RATIO: 0.74
PV PEAK D VEL: 0.57 M/S
PV PEAK GRADIENT: 3 MMHG
PV PEAK S VEL: 0.42 M/S
PV PEAK VELOCITY: 0.91 M/S
RA MAJOR: 5.61 CM
RA PRESSURE ESTIMATED: 3 MMHG
RA WIDTH: 4.45 CM
RIGHT VENTRICULAR END-DIASTOLIC DIMENSION: 4.46 CM
RV TB RVSP: 6 MMHG
SINUS: 2.24 CM
STJ: 2.13 CM
TDI LATERAL: 0.07 M/S
TDI SEPTAL: 0.05 M/S
TDI: 0.06 M/S
TR MAX PG: 43 MMHG
TRICUSPID ANNULAR PLANE SYSTOLIC EXCURSION: 2.17 CM
TV REST PULMONARY ARTERY PRESSURE: 46 MMHG
Z-SCORE OF LEFT VENTRICULAR DIMENSION IN END DIASTOLE: -2.71
Z-SCORE OF LEFT VENTRICULAR DIMENSION IN END SYSTOLE: -1.21

## 2023-08-22 PROCEDURE — 93306 ECHO (CUPID ONLY): ICD-10-PCS | Mod: 26,HCNC,, | Performed by: INTERNAL MEDICINE

## 2023-08-22 PROCEDURE — 93306 TTE W/DOPPLER COMPLETE: CPT | Mod: HCNC

## 2023-08-22 PROCEDURE — 93306 TTE W/DOPPLER COMPLETE: CPT | Mod: 26,HCNC,, | Performed by: INTERNAL MEDICINE

## 2023-08-24 ENCOUNTER — OFFICE VISIT (OUTPATIENT)
Dept: ENDOCRINOLOGY | Facility: CLINIC | Age: 73
End: 2023-08-24
Payer: MEDICARE

## 2023-08-24 VITALS
DIASTOLIC BLOOD PRESSURE: 88 MMHG | BODY MASS INDEX: 27.85 KG/M2 | TEMPERATURE: 99 F | HEART RATE: 73 BPM | SYSTOLIC BLOOD PRESSURE: 188 MMHG | WEIGHT: 177.81 LBS

## 2023-08-24 DIAGNOSIS — N18.31 STAGE 3A CHRONIC KIDNEY DISEASE: ICD-10-CM

## 2023-08-24 DIAGNOSIS — E11.42 DIABETIC PERIPHERAL NEUROPATHY ASSOCIATED WITH TYPE 2 DIABETES MELLITUS: Chronic | ICD-10-CM

## 2023-08-24 DIAGNOSIS — I15.2 HYPERTENSION ASSOCIATED WITH DIABETES: Chronic | ICD-10-CM

## 2023-08-24 DIAGNOSIS — E11.00 UNCONTROLLED TYPE 2 DIABETES MELLITUS WITH HYPEROSMOLAR NONKETOTIC HYPERGLYCEMIA: ICD-10-CM

## 2023-08-24 DIAGNOSIS — Z79.4 UNCONTROLLED TYPE 2 DIABETES MELLITUS WITH HYPERGLYCEMIA, WITH LONG-TERM CURRENT USE OF INSULIN: Primary | ICD-10-CM

## 2023-08-24 DIAGNOSIS — E11.65 UNCONTROLLED TYPE 2 DIABETES MELLITUS WITH HYPERGLYCEMIA, WITH LONG-TERM CURRENT USE OF INSULIN: Primary | ICD-10-CM

## 2023-08-24 DIAGNOSIS — E11.59 HYPERTENSION ASSOCIATED WITH DIABETES: Chronic | ICD-10-CM

## 2023-08-24 LAB — GLUCOSE SERPL-MCNC: 280 MG/DL (ref 70–110)

## 2023-08-24 PROCEDURE — 99215 PR OFFICE/OUTPT VISIT, EST, LEVL V, 40-54 MIN: ICD-10-PCS | Mod: HCNC,S$GLB,, | Performed by: HOSPITALIST

## 2023-08-24 PROCEDURE — 1159F PR MEDICATION LIST DOCUMENTED IN MEDICAL RECORD: ICD-10-PCS | Mod: HCNC,CPTII,S$GLB, | Performed by: HOSPITALIST

## 2023-08-24 PROCEDURE — 3079F PR MOST RECENT DIASTOLIC BLOOD PRESSURE 80-89 MM HG: ICD-10-PCS | Mod: HCNC,CPTII,S$GLB, | Performed by: HOSPITALIST

## 2023-08-24 PROCEDURE — 4010F ACE/ARB THERAPY RXD/TAKEN: CPT | Mod: HCNC,CPTII,S$GLB, | Performed by: HOSPITALIST

## 2023-08-24 PROCEDURE — 3066F PR DOCUMENTATION OF TREATMENT FOR NEPHROPATHY: ICD-10-PCS | Mod: HCNC,CPTII,S$GLB, | Performed by: HOSPITALIST

## 2023-08-24 PROCEDURE — 1101F PR PT FALLS ASSESS DOC 0-1 FALLS W/OUT INJ PAST YR: ICD-10-PCS | Mod: HCNC,CPTII,S$GLB, | Performed by: HOSPITALIST

## 2023-08-24 PROCEDURE — 3008F PR BODY MASS INDEX (BMI) DOCUMENTED: ICD-10-PCS | Mod: HCNC,CPTII,S$GLB, | Performed by: HOSPITALIST

## 2023-08-24 PROCEDURE — 3008F BODY MASS INDEX DOCD: CPT | Mod: HCNC,CPTII,S$GLB, | Performed by: HOSPITALIST

## 2023-08-24 PROCEDURE — 3288F FALL RISK ASSESSMENT DOCD: CPT | Mod: HCNC,CPTII,S$GLB, | Performed by: HOSPITALIST

## 2023-08-24 PROCEDURE — 3046F HEMOGLOBIN A1C LEVEL >9.0%: CPT | Mod: HCNC,CPTII,S$GLB, | Performed by: HOSPITALIST

## 2023-08-24 PROCEDURE — 82962 GLUCOSE BLOOD TEST: CPT | Mod: HCNC,S$GLB,, | Performed by: HOSPITALIST

## 2023-08-24 PROCEDURE — 1159F MED LIST DOCD IN RCRD: CPT | Mod: HCNC,CPTII,S$GLB, | Performed by: HOSPITALIST

## 2023-08-24 PROCEDURE — 82962 POCT GLUCOSE, HAND-HELD DEVICE: ICD-10-PCS | Mod: HCNC,S$GLB,, | Performed by: HOSPITALIST

## 2023-08-24 PROCEDURE — 99999 PR PBB SHADOW E&M-EST. PATIENT-LVL V: CPT | Mod: PBBFAC,HCNC,, | Performed by: HOSPITALIST

## 2023-08-24 PROCEDURE — 3066F NEPHROPATHY DOC TX: CPT | Mod: HCNC,CPTII,S$GLB, | Performed by: HOSPITALIST

## 2023-08-24 PROCEDURE — 4010F PR ACE/ARB THEARPY RXD/TAKEN: ICD-10-PCS | Mod: HCNC,CPTII,S$GLB, | Performed by: HOSPITALIST

## 2023-08-24 PROCEDURE — 1101F PT FALLS ASSESS-DOCD LE1/YR: CPT | Mod: HCNC,CPTII,S$GLB, | Performed by: HOSPITALIST

## 2023-08-24 PROCEDURE — 3077F PR MOST RECENT SYSTOLIC BLOOD PRESSURE >= 140 MM HG: ICD-10-PCS | Mod: HCNC,CPTII,S$GLB, | Performed by: HOSPITALIST

## 2023-08-24 PROCEDURE — 3046F PR MOST RECENT HEMOGLOBIN A1C LEVEL > 9.0%: ICD-10-PCS | Mod: HCNC,CPTII,S$GLB, | Performed by: HOSPITALIST

## 2023-08-24 PROCEDURE — 99999 PR PBB SHADOW E&M-EST. PATIENT-LVL V: ICD-10-PCS | Mod: PBBFAC,HCNC,, | Performed by: HOSPITALIST

## 2023-08-24 PROCEDURE — 3062F POS MACROALBUMINURIA REV: CPT | Mod: HCNC,CPTII,S$GLB, | Performed by: HOSPITALIST

## 2023-08-24 PROCEDURE — 3288F PR FALLS RISK ASSESSMENT DOCUMENTED: ICD-10-PCS | Mod: HCNC,CPTII,S$GLB, | Performed by: HOSPITALIST

## 2023-08-24 PROCEDURE — 3079F DIAST BP 80-89 MM HG: CPT | Mod: HCNC,CPTII,S$GLB, | Performed by: HOSPITALIST

## 2023-08-24 PROCEDURE — 3062F PR POS MACROALBUMINURIA RESULT DOCUMENTED/REVIEW: ICD-10-PCS | Mod: HCNC,CPTII,S$GLB, | Performed by: HOSPITALIST

## 2023-08-24 PROCEDURE — 3077F SYST BP >= 140 MM HG: CPT | Mod: HCNC,CPTII,S$GLB, | Performed by: HOSPITALIST

## 2023-08-24 PROCEDURE — 99215 OFFICE O/P EST HI 40 MIN: CPT | Mod: HCNC,S$GLB,, | Performed by: HOSPITALIST

## 2023-08-24 RX ORDER — INSULIN ASPART 100 [IU]/ML
20 INJECTION, SOLUTION INTRAVENOUS; SUBCUTANEOUS
Qty: 15 ML | Refills: 8 | Status: SHIPPED | OUTPATIENT
Start: 2023-08-24 | End: 2023-08-25 | Stop reason: SDUPTHER

## 2023-08-24 RX ORDER — BLOOD-GLUCOSE TRANSMITTER
EACH MISCELLANEOUS
Qty: 1 EACH | Refills: 3 | Status: SHIPPED | OUTPATIENT
Start: 2023-08-24 | End: 2023-11-03 | Stop reason: CLARIF

## 2023-08-24 RX ORDER — PEN NEEDLE, DIABETIC 30 GX3/16"
NEEDLE, DISPOSABLE MISCELLANEOUS
Qty: 400 EACH | Refills: 3 | Status: SHIPPED | OUTPATIENT
Start: 2023-08-24 | End: 2023-11-03 | Stop reason: CLARIF

## 2023-08-24 RX ORDER — INSULIN DETEMIR 100 [IU]/ML
15 INJECTION, SOLUTION SUBCUTANEOUS NIGHTLY
Qty: 15 ML | Refills: 7 | Status: SHIPPED | OUTPATIENT
Start: 2023-08-24 | End: 2023-08-25 | Stop reason: SDUPTHER

## 2023-08-24 RX ORDER — BLOOD-GLUCOSE SENSOR
EACH MISCELLANEOUS
Qty: 3 EACH | Refills: 12 | Status: SHIPPED | OUTPATIENT
Start: 2023-08-24 | End: 2023-11-03 | Stop reason: CLARIF

## 2023-08-24 NOTE — ASSESSMENT & PLAN NOTE
- Diabetes is not at goal, given most current A1C, Goal A1C for patient is 7%  - Complicated by hyperglycemia, Longstanding history of noncompliance to insulin regimen, poor insight into diabetes care  - Patient with fear of hypoglycemia, therefore leaving her glucose high  - Patient reports chronic nausea and vomiting  For which she attributed due to her diabetes medication, this has limited treatment option and compliance  - Diabetes goal including glucose glucose and A1C goals discussed  - Advised patient to get routine feet care, routine eye exam, plus routine maintenance screening, reviewed   - Discussed proper insulin injection technique: Including rotation of injection sites, using new insulin needles  - Diabetic supplies/medications were reviewed this visit to ensure continue steady supplies    Plan  - Very difficult case to manage, given poor compliance, dietary indiscretion, irrational fear of hypoglycemia as well as fear of nausea/vomiting  - Adjustment made:   More schedule Novolo units for breakfast, 15 units for lunch 20 unit for dinner  - Start Levemir 15 units nightly at 8 PM  - Continue Trulicity 1.5 mg once a week, unable to tolerate larger doses  - Encouragement of dietary modification, portion size control, decreasing carbohydrates intake  - Restart Dexcom G6, previously getting it from [facility]  - Follow up as scheduled, 3 months

## 2023-08-24 NOTE — ASSESSMENT & PLAN NOTE
- Renal function reviewed on lab work today, stable   - Renally Adjust diabetes medication, avoid hypoglycemia  - continue routine monitoring

## 2023-08-24 NOTE — PROGRESS NOTES
"Subjective:      Patient ID: Lorena Contreras is a 72 y.o. female presented to [facility] on 8/24/2023.  Chief Complaint:  No chief complaint on file.    History of Present Illness: Lorena Contreras is a 72 y.o. female here for   type 2 diabetes  Other significant past medical history: CAD s/p MI 3/2019 s/p stent placement,  hypertension    Diabetes mellitus Type 2  - Known diabetic complications: retinopathy, peripheral neuropathy, cardiovascular disease, and cerebrovascular disease  - Diagnosed w/ DM: in >20 years  - Diabetes Education: Yes, many years ago, at [facility]  - Family history of diabetes: Yes  - Hx of pancreatitis/Diabetes Related Hospitalization:  No  - Patient  quite concerned about medication induced nausea and vomiting.  She attributes her symptoms to a lot of diabetes medication in the past  - Pt afraid to take insulin because she fears hypoglycemia.  Patient with symptoms of hypoglycemia less than 100    Interval history:   chart review show patient is being sent by PCP team for type 2 diabetes.  Patient with chronic poorly-controlled type 2 diabetes, recent A1c 13%, previous 14%.  Previously seen by  Concepcion Zee NP for diabetes management.  Last office visit  10/2022, with patient not showing up to follow-up appointment.  Patient was not happy at her last office visit due to drastic changes to her regimen  leading to to chronic nausea and vomiting once again  Patient with with poor compliance to insulin.  Since last office visit she start taking the Tresiba as she attributes the as the cause of her nausea   She takes NovoLog mealtime on occasion.  Only with lunch, possibly with dinner parent patient on Trulicity, unable to tolerate higher dose, currently only on 1.5 mg once a week possibly on glimepiride, unclear.  Arrives with sister Milton.   Patient also reports symptoms of abdominal pain, possible UTI with dysuria  Patient reports that she checks her glucose "3 " "times a day" glucose elevated this morning at 300, prior to lunch today glucose was checked in clinic, 280  Finds that her BGs have improved with Trulicity, down form 400-500s to now 200s. But today, BG erick to 400s after eating yogurt and banana; no insulin yet today.   Currently not on Dexcom G6 due to issues, patient inadvertently  threw out her transmitter    Reports that she has a bad memory  When talking about diet: I dont eat bread rice and pasta, With does consume "grits"  frequent  When  discussing about nutrition and Diabetes Education," I know all about it"      Current reported meds:               Trulicity 1.5mg  once a week>>  unable to tolerate high dose due to nausea   Novolog "sliding scale" 5 for breakfast, 15 units for lunch, does not recall night does   Glimepiride patient unable to confirm if she is taking this medication  Misses medication doses - Yes  Injection Technique: Good  Rotation of injection site: Yes   Previous meds tried:              Metformin 1000 mg bid  Novolog 70/30 30 units BID ac   Glipizide dc'd d/t recurrent hypoglycemia; also had nausea   Victoza - severe nausea and abdominal pain   Started mixed analog insulin 5/2017  Home glucose checks: checks 3x a day, Logs reviewed/Unavailable: oral recall: 300  - Hypoglycemia symptoms:  when her glucose decreased to low 100s  Diet/Exercise:   - CURRENT DIET: Doesn't generally eat breakfast. In the mornings, she usually just drinks coffee decaf.   Eats 2 meals/day, lunch 4 pm and dinner at 9 pm. First food intake is at 4 pm, no snacks until after dinner. Likes to eat 1/2 nutty butty after dinner.   Drinks unsweet tea, green tea without sugar.   - Weight trend: stable  - Occupation: not working  - Eye exam current (within one year): yes, DR:  yes  - Reports cuts or ulcers on feet:   Denies, has podiatrist  - Statin: Taking, ACE/ARB: Not taking    Diabetes lab work  Lab Results   Component Value Date    HGBA1C 13.9 (H) 04/10/2023    " "HGBA1C >14.0 (H) 05/26/2022    HGBA1C 9.9 (H) 02/11/2022    HGBA1C 13.8 (H) 10/22/2021     Chart review show longstanding history of poorly-controlled type 2 diabetes, with A1c above 8% for many years.  Recently A1C 13, 14      No results found for: "CPEPTIDE", "GLUTAMICACID", "ISLETCELLANT"   No results found for: "FRUCTOSAMINE"  Lab Results   Component Value Date    MICALBCREAT 921.7 (H) 06/22/2023     Lab Results   Component Value Date    LNDIIRKE83 1161 (H) 05/26/2022       Diabetes Management Status: Reviewed this office visit  Screening or Prevention Patient's value Goal Complete/Controlled?   Lipid profile : 04/10/2023 Annually Yes   Dilated retinal exam : 03/12/2021 Annually Yes   Foot exam   : 10/20/2022 Annually Yes      Reviewed past surgical, medical, family, social history and updated as appropriate.  Review of Systems: see HPI above  Objective:   BP (!) 188/88   Pulse 73   Temp 98.8 °F (37.1 °C) (Oral)   Wt 80.6 kg (177 lb 12.8 oz)   BMI 27.85 kg/m²   Body mass index is 27.85 kg/m².  Vital signs reviewed    Physical Exam  Vitals and nursing note reviewed.   Constitutional:       Appearance: Normal appearance. She is well-developed. She is obese. She is not ill-appearing.   Neck:      Thyroid: No thyromegaly.   Pulmonary:      Effort: Pulmonary effort is normal. No respiratory distress.   Musculoskeletal:         General: Normal range of motion.      Cervical back: Normal range of motion.   Neurological:      General: No focal deficit present.      Mental Status: She is alert. Mental status is at baseline.   Psychiatric:         Mood and Affect: Mood normal.         Behavior: Behavior normal.     Lab Reviewed:  See results in subjective  Lab Results   Component Value Date    HGBA1C 13.9 (H) 04/10/2023     Lab Results   Component Value Date    CHOL 108 (L) 04/10/2023    HDL 34 (L) 04/10/2023    LDLCALC 48.0 (L) 04/10/2023    TRIG 130 04/10/2023    CHOLHDL 31.5 04/10/2023     Lab Results   Component " "Value Date     08/18/2023    K 5.0 08/18/2023     08/18/2023    CO2 22 (L) 08/18/2023     (H) 08/18/2023    BUN 35 (H) 08/18/2023    CREATININE 1.2 08/18/2023    CALCIUM 8.9 08/18/2023    PHOS 2.7 04/11/2023    PROT 8.2 04/10/2023    ALBUMIN 3.5 04/10/2023    BILITOT 0.4 04/10/2023    ALKPHOS 167 (H) 04/10/2023    AST 40 04/10/2023    ALT 41 04/10/2023    ANIONGAP 9 08/18/2023    ESTGFRAFRICA 53 (A) 06/21/2022    EGFRNONAA 46 (A) 06/21/2022    TSH 0.602 04/10/2023    PTH 66.5 10/22/2021    [id number] 87 08/23/2016     Assessment     1. Uncontrolled type 2 diabetes mellitus with hyperglycemia, with long-term current use of insulin  Ambulatory referral/consult to Endocrinology    NOVOLOG FLEXPEN U-100 INSULIN 100 unit/mL (3 mL) InPn pen    POCT Glucose, Hand-Held Device    insulin detemir U-100, Levemir, (LEVEMIR FLEXPEN) 100 unit/mL (3 mL) InPn pen    pen needle, diabetic (BD ULTRA-FINE SAGAR PEN NEEDLE) 32 gauge x 5/32" Ndle    blood-glucose sensor (DEXCOM G6 SENSOR) Samina    blood-glucose transmitter (DEXCOM G6 TRANSMITTER) Samina    HEMOGLOBIN A1C    RENAL FUNCTION PANEL    C-Peptide      2. Uncontrolled type 2 diabetes mellitus with hyperosmolar nonketotic hyperglycemia        3. Diabetic peripheral neuropathy associated with type 2 diabetes mellitus        4. Hypertension associated with diabetes        5. Stage 3a chronic kidney disease           Plan     Uncontrolled type 2 diabetes mellitus with hyperglycemia, with long-term current use of insulin  - Diabetes is not at goal, given most current A1C, Goal A1C for patient is 7%  - Complicated by hyperglycemia, Longstanding history of noncompliance to insulin regimen, poor insight into diabetes care  - Patient with fear of hypoglycemia, therefore leaving her glucose high  - Patient reports chronic nausea and vomiting  For which she attributed due to her diabetes medication, this has limited treatment option and compliance  - Diabetes goal " including glucose glucose and A1C goals discussed  - Advised patient to get routine feet care, routine eye exam, plus routine maintenance screening, reviewed   - Discussed proper insulin injection technique: Including rotation of injection sites, using new insulin needles  - Diabetic supplies/medications were reviewed this visit to ensure continue steady supplies    Plan  - Very difficult case to manage, given poor compliance, dietary indiscretion, irrational fear of hypoglycemia as well as fear of nausea/vomiting  - Adjustment made:   More schedule Novolo units for breakfast, 15 units for lunch 20 unit for dinner  - Start Levemir 15 units nightly at 8 PM  - Continue Trulicity 1.5 mg once a week, unable to tolerate larger doses  - Encouragement of dietary modification, portion size control, decreasing carbohydrates intake  - Restart Dexcom G6, [facility]  - Follow up as scheduled, 3 months      Hypertension associated with diabetes  - blood pressure review in clinic today  - manage by PCP    Stage 3a chronic kidney disease  - Renal function reviewed on lab work today, stable   - Renally Adjust diabetes medication, avoid hypoglycemia  - continue routine monitoring      Advised patient to follow up with PCP for routine health maintenance care.   RTC in 3 months    Total Time for E/M Service preformed was greater than 40minutes: Including review of medical records, direct face-to-face time with patient with discussion  of poorly-controlled diabetes, and encouragement of better compliance. Along with active medical decision-making in office today.     Gerardo Barbour M.D.  Endocrinology  [facility]  2023      Disclaimer: This note has been generated in part with the use of voice-recognition software. There may be typographical errors that have been missed during  proof-reading.    -------------------------------------------------------------------------------------------------  Indications for CGMs:    Patent with diagnosed: Diabetes Mellitus that required frequent glucose monitoring (4 + times a day and 4x/day testing), frequent adjustments to insulin regiment based on BG or CGM results. Patent requires a therapeutic CGM and is willing to use therapeutic CGM/SMBG for Necessary frequent adjustments in insulin therapy, patient demonstrated an understanding of the technology (can use and recognize alerts and alarms). I, the physician, have assessed adherence to CGM regimen and to the plan, patient will have close follow up in clinic as indicated, every 3 months to 6 months.     Patient experiences (including but not limited to):  [x]   Discrepancies between records and A1C, at risk severe hypoglycemia episode and hospitalization  []   Recurrent, unexplained, severe hypoglycemic episodes  [x]   Postprandial hyperglycemia  [x]   Unexplained blood glucose excursions  []   Impaired awareness of hypoglycemia    I believe that the patient will greatly benefit from wearing CGM because this will allow for better glucose control, help to avoid high/lows and prevent hospitalization.  This also is safer for patient in using CGM during the COVID public health emergency.

## 2023-08-24 NOTE — PATIENT INSTRUCTIONS
Levemir 15 units nightly at 8 PM    Novolo units for breakfast, 15 units for lunch 20 unit for dinner    Trulicity 1.5 mg once a week

## 2023-08-25 ENCOUNTER — TELEPHONE (OUTPATIENT)
Dept: ENDOCRINOLOGY | Facility: CLINIC | Age: 73
End: 2023-08-25
Payer: MEDICARE

## 2023-08-25 RX ORDER — INSULIN DETEMIR 100 [IU]/ML
15 INJECTION, SOLUTION SUBCUTANEOUS NIGHTLY
Qty: 15 ML | Refills: 7 | Status: SHIPPED | OUTPATIENT
Start: 2023-08-25 | End: 2024-03-13

## 2023-08-25 RX ORDER — INSULIN ASPART 100 [IU]/ML
20 INJECTION, SOLUTION INTRAVENOUS; SUBCUTANEOUS
Qty: 15 ML | Refills: 8 | Status: SHIPPED | OUTPATIENT
Start: 2023-08-25 | End: 2024-03-13 | Stop reason: SDUPTHER

## 2023-08-25 NOTE — TELEPHONE ENCOUNTER
----- Message from Colleen An sent at 8/25/2023  1:52 PM CDT -----  Regarding: Pharmacy Call Back  Type:  Pharmacy Calling to Clarify an RX    Name of Caller: Anthony     Pharmacy Name: [facility]     Prescription Name: insulin detemir U-100, Levemir, (LEVEMIR FLEXPEN) 100 unit/mL (3 mL) InPn pen    What do they need to clarify? Needs to clarify usage     Can you be contacted via [facility]? no    Best Call Back Number: [phone]

## 2023-09-01 DIAGNOSIS — I25.118 CORONARY ARTERY DISEASE OF NATIVE ARTERY OF NATIVE HEART WITH STABLE ANGINA PECTORIS: ICD-10-CM

## 2023-09-01 RX ORDER — CALCIUM CITRATE/VITAMIN D3 200MG-6.25
TABLET ORAL
COMMUNITY
Start: 2023-06-30 | End: 2023-11-03 | Stop reason: CLARIF

## 2023-09-01 NOTE — TELEPHONE ENCOUNTER
----- Message from Meg Woodard sent at 9/1/2023  1:00 PM CDT -----  Regarding: Refill request  .Type: RX Refill Request    Who Called:self     Have you contacted your pharmacy:no     Refill or New Rx: Refill    RX Name and Strength:isosorbide mononitrate (IMDUR) 30 MG 24 hr tablet    Preferred Pharmacy with phone number: .  [facility] [place] - [address] [facility]  [address] [place] [address]  Phone: [phone] Fax: [phone]    Local or Mail Order:local     Ordering Provider:JOO Paris     Would the patient rather a call back or a response via [facility]? Call     Best Call Back Number: .[phone]      Additional Information: states she needs all medications refilled but does not know the names

## 2023-09-01 NOTE — TELEPHONE ENCOUNTER
No care due was identified.  [employer] Embedded Care Due Messages. Reference number: [id number].   9/01/2023 2:00:31 PM CDT

## 2023-09-02 RX ORDER — ISOSORBIDE MONONITRATE 30 MG/1
30 TABLET, EXTENDED RELEASE ORAL DAILY
Qty: 90 TABLET | Refills: 0 | Status: SHIPPED | OUTPATIENT
Start: 2023-09-02 | End: 2023-09-15 | Stop reason: SDUPTHER

## 2023-09-02 NOTE — TELEPHONE ENCOUNTER
Refill Routing Note   Medication(s) are not appropriate for processing by [facility] for the following reason(s):      Patient seen in ED/Hospital since LOV with provider:     [facility] action(s):  Defer Care Due:  None identified   Medication Therapy Plan:         Appointments  past 12m or future 3m with PCP    Date Provider   Last Visit   9/6/2022 Jorge Paris MD   Next Visit   Visit date not found Jorge Paris MD   ED visits in past 90 days: 0        Note composed:8:19 PM 09/01/2023

## 2023-09-15 ENCOUNTER — OFFICE VISIT (OUTPATIENT)
Dept: FAMILY MEDICINE | Facility: CLINIC | Age: 73
End: 2023-09-15
Payer: MEDICARE

## 2023-09-15 VITALS
RESPIRATION RATE: 20 BRPM | OXYGEN SATURATION: 97 % | SYSTOLIC BLOOD PRESSURE: 138 MMHG | TEMPERATURE: 98 F | HEART RATE: 72 BPM | WEIGHT: 180.56 LBS | BODY MASS INDEX: 28.28 KG/M2 | DIASTOLIC BLOOD PRESSURE: 70 MMHG

## 2023-09-15 DIAGNOSIS — I25.118 CORONARY ARTERY DISEASE OF NATIVE ARTERY OF NATIVE HEART WITH STABLE ANGINA PECTORIS: ICD-10-CM

## 2023-09-15 DIAGNOSIS — F41.9 ANXIETY: ICD-10-CM

## 2023-09-15 DIAGNOSIS — I15.2 HYPERTENSION ASSOCIATED WITH DIABETES: Chronic | ICD-10-CM

## 2023-09-15 DIAGNOSIS — E11.59 HYPERTENSION ASSOCIATED WITH DIABETES: Chronic | ICD-10-CM

## 2023-09-15 DIAGNOSIS — K21.9 GASTROESOPHAGEAL REFLUX DISEASE, UNSPECIFIED WHETHER ESOPHAGITIS PRESENT: ICD-10-CM

## 2023-09-15 DIAGNOSIS — Z78.0 POST-MENOPAUSAL: ICD-10-CM

## 2023-09-15 DIAGNOSIS — Z12.31 ENCOUNTER FOR SCREENING MAMMOGRAM FOR BREAST CANCER: ICD-10-CM

## 2023-09-15 DIAGNOSIS — Z00.00 ROUTINE PHYSICAL EXAMINATION: Primary | ICD-10-CM

## 2023-09-15 DIAGNOSIS — E11.3293 MILD NONPROLIFERATIVE DIABETIC RETINOPATHY OF BOTH EYES ASSOCIATED WITH TYPE 2 DIABETES MELLITUS, MACULAR EDEMA PRESENCE UNSPECIFIED: ICD-10-CM

## 2023-09-15 PROBLEM — S91.351A DOG BITE OF RIGHT FOOT: Status: RESOLVED | Noted: 2021-05-28 | Resolved: 2023-09-15

## 2023-09-15 PROBLEM — A49.01 MSSA (METHICILLIN SUSCEPTIBLE STAPHYLOCOCCUS AUREUS) INFECTION: Status: RESOLVED | Noted: 2021-06-07 | Resolved: 2023-09-15
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
## 2023-09-15 PROBLEM — I16.1 HYPERTENSIVE EMERGENCY: Status: RESOLVED | Noted: 2022-06-19 | Resolved: 2023-09-15

## 2023-09-15 PROBLEM — M86.9 OSTEOMYELITIS OF FOOT: Status: RESOLVED | Noted: 2021-07-27 | Resolved: 2023-09-15

## 2023-09-15 PROCEDURE — 99397 PR PREVENTIVE VISIT,EST,65 & OVER: ICD-10-PCS | Mod: HCNC,S$GLB,, | Performed by: FAMILY MEDICINE

## 2023-09-15 PROCEDURE — 3062F PR POS MACROALBUMINURIA RESULT DOCUMENTED/REVIEW: ICD-10-PCS | Mod: HCNC,CPTII,S$GLB, | Performed by: FAMILY MEDICINE

## 2023-09-15 PROCEDURE — 3078F DIAST BP <80 MM HG: CPT | Mod: HCNC,CPTII,S$GLB, | Performed by: FAMILY MEDICINE

## 2023-09-15 PROCEDURE — 3008F BODY MASS INDEX DOCD: CPT | Mod: HCNC,CPTII,S$GLB, | Performed by: FAMILY MEDICINE

## 2023-09-15 PROCEDURE — 3008F PR BODY MASS INDEX (BMI) DOCUMENTED: ICD-10-PCS | Mod: HCNC,CPTII,S$GLB, | Performed by: FAMILY MEDICINE

## 2023-09-15 PROCEDURE — 99999 PR PBB SHADOW E&M-EST. PATIENT-LVL III: ICD-10-PCS | Mod: PBBFAC,HCNC,, | Performed by: FAMILY MEDICINE

## 2023-09-15 PROCEDURE — 3078F PR MOST RECENT DIASTOLIC BLOOD PRESSURE < 80 MM HG: ICD-10-PCS | Mod: HCNC,CPTII,S$GLB, | Performed by: FAMILY MEDICINE

## 2023-09-15 PROCEDURE — 3046F PR MOST RECENT HEMOGLOBIN A1C LEVEL > 9.0%: ICD-10-PCS | Mod: HCNC,CPTII,S$GLB, | Performed by: FAMILY MEDICINE

## 2023-09-15 PROCEDURE — 4010F ACE/ARB THERAPY RXD/TAKEN: CPT | Mod: HCNC,CPTII,S$GLB, | Performed by: FAMILY MEDICINE

## 2023-09-15 PROCEDURE — 99999 PR PBB SHADOW E&M-EST. PATIENT-LVL III: CPT | Mod: PBBFAC,HCNC,, | Performed by: FAMILY MEDICINE

## 2023-09-15 PROCEDURE — 3066F PR DOCUMENTATION OF TREATMENT FOR NEPHROPATHY: ICD-10-PCS | Mod: HCNC,CPTII,S$GLB, | Performed by: FAMILY MEDICINE

## 2023-09-15 PROCEDURE — 3075F PR MOST RECENT SYSTOLIC BLOOD PRESS GE 130-139MM HG: ICD-10-PCS | Mod: HCNC,CPTII,S$GLB, | Performed by: FAMILY MEDICINE

## 2023-09-15 PROCEDURE — 3062F POS MACROALBUMINURIA REV: CPT | Mod: HCNC,CPTII,S$GLB, | Performed by: FAMILY MEDICINE

## 2023-09-15 PROCEDURE — 1125F AMNT PAIN NOTED PAIN PRSNT: CPT | Mod: HCNC,CPTII,S$GLB, | Performed by: FAMILY MEDICINE

## 2023-09-15 PROCEDURE — 4010F PR ACE/ARB THEARPY RXD/TAKEN: ICD-10-PCS | Mod: HCNC,CPTII,S$GLB, | Performed by: FAMILY MEDICINE

## 2023-09-15 PROCEDURE — 1125F PR PAIN SEVERITY QUANTIFIED, PAIN PRESENT: ICD-10-PCS | Mod: HCNC,CPTII,S$GLB, | Performed by: FAMILY MEDICINE

## 2023-09-15 PROCEDURE — 3066F NEPHROPATHY DOC TX: CPT | Mod: HCNC,CPTII,S$GLB, | Performed by: FAMILY MEDICINE

## 2023-09-15 PROCEDURE — 3046F HEMOGLOBIN A1C LEVEL >9.0%: CPT | Mod: HCNC,CPTII,S$GLB, | Performed by: FAMILY MEDICINE

## 2023-09-15 PROCEDURE — 99397 PER PM REEVAL EST PAT 65+ YR: CPT | Mod: HCNC,S$GLB,, | Performed by: FAMILY MEDICINE

## 2023-09-15 PROCEDURE — 3075F SYST BP GE 130 - 139MM HG: CPT | Mod: HCNC,CPTII,S$GLB, | Performed by: FAMILY MEDICINE

## 2023-09-15 RX ORDER — PANTOPRAZOLE SODIUM 40 MG/1
40 TABLET, DELAYED RELEASE ORAL DAILY
Qty: 90 TABLET | Refills: 3 | Status: SHIPPED | OUTPATIENT
Start: 2023-09-15

## 2023-09-15 RX ORDER — DULAGLUTIDE 3 MG/.5ML
3 INJECTION, SOLUTION SUBCUTANEOUS
Qty: 4 PEN | Refills: 11 | Status: SHIPPED | OUTPATIENT
Start: 2023-09-15 | End: 2024-03-13 | Stop reason: SDUPTHER

## 2023-09-15 RX ORDER — ISOSORBIDE MONONITRATE 30 MG/1
30 TABLET, EXTENDED RELEASE ORAL DAILY
Qty: 90 TABLET | Refills: 3 | Status: SHIPPED | OUTPATIENT
Start: 2023-09-15

## 2023-09-15 RX ORDER — FLUOXETINE HYDROCHLORIDE 40 MG/1
40 CAPSULE ORAL DAILY
Qty: 90 CAPSULE | Refills: 3 | Status: SHIPPED | OUTPATIENT
Start: 2023-09-15

## 2023-09-15 NOTE — PROGRESS NOTES
[facility]  Progress Note    SUBJECTIVE:     Chief Complaint   Patient presents with    Medication Refill       HPI   Lorena Contreras  is a 72 y.o. female here for routine physical exam. Also here for follow-up of her chronic conditions. Patient has no other new complaints/problems at this time.      Review of patient's allergies indicates:   Allergen Reactions    Novolin 70/30 (semi-synthetic) Nausea And Vomiting     Severe vomiting on Relion 70/30    Sulfa (sulfonamide antibiotics) Anaphylaxis    Talwin [pentazocine lactate] Anaphylaxis    Victoza [liraglutide] Nausea And Vomiting    Glipizide Nausea Only    Citric acid     Codeine     Influenza virus vaccines Hives    Iodine and iodide containing products Hives    Levetiracetam Itching    Rituxan [rituximab] Hives    Zoloft [sertraline] Nausea And Vomiting       Past Medical History:   Diagnosis Date    Allergy     Altered mental status 06/19/2022    DYSARTHRIA, SPASTIC MOVEMENTS & DIFFICULTY SWALLOWING    Anemia     Anxiety     Arthritis     Cataract     both removed    Colon polyps     Coronary artery disease     Depression     Diabetes mellitus, type II     Disorder of kidney and ureter     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/2023    Fibromyalgia     Follicular lymphoma     GERD (gastroesophageal reflux disease)     HTN (hypertension)     Hyperlipidemia     MI (myocardial infarction) 03/2019    Personal history of colonic polyps     Restless leg syndrome     Stroke      Past Surgical History:   Procedure Laterality Date    COLONOSCOPY  11/07/2012    Colon polyp found; repeat in 5 years    ELBOW SURGERY Right 2015    dislocation repair     ESOPHAGOGASTRODUODENOSCOPY  11/07/2012    atrophic gastritis, H pylori testing negative    INCISION AND DRAINAGE FOOT Right 6/2/2021    Procedure: INCISION AND DRAINAGE, FOOT, bone biopsy;  Surgeon: Quiana Penn DPM;  Location: [facility] [address];  Service: Podiatry;  Laterality: Right;    KNEE SURGERY Bilateral 2015     scoped    LEFT HEART CATHETERIZATION Left 3/29/2019    Procedure: Left heart cath;  Surgeon: Bladimir Barbosa MD;  Location: [facility] CATH LAB;  Service: Cardiology;  Laterality: Left;    LEFT HEART CATHETERIZATION Left 11/18/2019    Procedure: Left heart cath;  Surgeon: Karl Rico MD;  Location: [facility] CATH LAB;  Service: Cardiology;  Laterality: Left;    LEFT HEART CATHETERIZATION Left 1/8/2020    Procedure: Left heart cath, right radial, noon start;  Surgeon: Christos Monreal MD;  Location: [facility] CATH LAB;  Service: Cardiology;  Laterality: Left;  RN Pre Op 1-6-20.  To be admitted 1-7-20 sor Aspirin Disensitation    TONSILLECTOMY  1955    ULTRASOUND GUIDANCE  1/8/2020    Procedure: Ultrasound Guidance;  Surgeon: Christos Monreal MD;  Location: [facility] CATH LAB;  Service: Cardiology;;     Family History   Problem Relation Age of Onset    Cancer Mother         colon    Heart disease Mother     Cancer Father         lung    Lung cancer Brother     Diabetes Sister     Hypertension Sister     Allergy (severe) Daughter     No Known Problems Daughter     Stroke Neg Hx     Hyperlipidemia Neg Hx      Social History     Tobacco Use    Smoking status: Never    Smokeless tobacco: Never   Substance Use Topics    Alcohol use: Not Currently    Drug use: Never        Review of Systems   Constitutional:  Negative for chills, diaphoresis and fever.   HENT:  Negative for congestion, ear pain and sore throat.    Eyes:  Negative for photophobia and discharge.   Respiratory:  Negative for cough, shortness of breath and wheezing.    Cardiovascular:  Negative for chest pain and palpitations.   Gastrointestinal:  Negative for abdominal pain, constipation, diarrhea, nausea and vomiting.   Genitourinary:  Negative for dysuria and hematuria.   Musculoskeletal:  Negative for back pain and myalgias.   Skin:  Negative for itching and rash.   Neurological:  Negative for dizziness, sensory change, focal weakness, weakness and headaches.   All other  systems reviewed and are negative.    OBJECTIVE:     Vitals:    09/15/23 1416   BP: 138/70   Pulse: 72   Resp: 20   Temp: 98.1 °F (36.7 °C)     Body mass index is 28.28 kg/m².    Physical Exam  Constitutional:       General: She is not in acute distress.     Appearance: She is not diaphoretic.   HENT:      Head: Normocephalic and atraumatic.      Right Ear: Tympanic membrane and ear canal normal. No hemotympanum. Tympanic membrane is not perforated, erythematous or bulging.      Left Ear: Tympanic membrane and ear canal normal. No hemotympanum. Tympanic membrane is not perforated, erythematous or bulging.   Eyes:      Conjunctiva/sclera: Conjunctivae normal.      Pupils: Pupils are equal, round, and reactive to light.   Neck:      Thyroid: No thyromegaly.   Cardiovascular:      Rate and Rhythm: Normal rate and regular rhythm.      Heart sounds: Normal heart sounds. No murmur heard.     No friction rub. No gallop.   Pulmonary:      Effort: Pulmonary effort is normal. No respiratory distress.      Breath sounds: Normal breath sounds. No wheezing or rales.   Abdominal:      General: Bowel sounds are normal. There is no distension.      Palpations: Abdomen is soft.      Tenderness: There is no abdominal tenderness. There is no guarding or rebound.   Musculoskeletal:         General: No tenderness. Normal range of motion.   Skin:     General: Skin is warm.      Findings: No erythema or rash.   Neurological:      Mental Status: She is alert and oriented to person, place, and time.         Old records were reviewed. Labs and/or images were independently reviewed.    ASSESSMENT     1. Routine physical examination    2. Gastroesophageal reflux disease, unspecified whether esophagitis present    3. Coronary artery disease of native artery of native heart with stable angina pectoris    4. Anxiety    5. Hypertension associated with diabetes    6. Mild nonproliferative diabetic retinopathy of both eyes associated with type 2  diabetes mellitus, macular edema presence unspecified        PLAN:     Routine physical examination   -     We briefly discussed diet, exercise, and routine preventive exams. All questions and comments addressed.    Gastroesophageal reflux disease, unspecified whether esophagitis present  -     pantoprazole (PROTONIX) 40 MG tablet; Take 1 tablet (40 mg total) by mouth once daily.  Dispense: 90 tablet; Refill: 3  -     Stable. Continue current regimen.    Coronary artery disease of native artery of native heart with stable angina pectoris  -     ticagrelor (BRILINTA) 90 mg tablet; Take 1 tablet (90 mg total) by mouth 2 (two) times daily.  Dispense: 180 tablet; Refill: 3  -     isosorbide mononitrate (IMDUR) 30 MG 24 hr tablet; Take 1 tablet (30 mg total) by mouth once daily.  Dispense: 90 tablet; Refill: 3  -     Stable. Continue current regimen.    Anxiety  -     FLUoxetine 40 MG capsule; Take 1 capsule (40 mg total) by mouth once daily.  Dispense: 90 capsule; Refill: 3  -     Stable. Continue current regimen.    Hypertension associated with diabetes   -     Stable. Continue current regimen.    Mild nonproliferative diabetic retinopathy of both eyes associated with type 2 diabetes mellitus, macular edema presence unspecified  -     Increase dulaglutide (TRULICITY) 3 mg/0.5 mL pen injector; Inject 3 mg into the skin every 7 days.  Dispense: 4 pen ; Refill: 11  -     Instructed patient to take daily glucose AM logs and to write them down to bring with on next visit. Advised patient to decrease intake of carbohydrates/simple sugars.           RTC PRN or f/u in 1 month or f/u with endo for diabetes.    Murray Zee MD  09/15/2023 2:30 PM

## 2023-10-12 ENCOUNTER — HOSPITAL ENCOUNTER (EMERGENCY)
Facility: HOSPITAL | Age: 73
Discharge: HOME | End: 2023-10-12
Attending: EMERGENCY MEDICINE
Payer: MEDICARE

## 2023-10-12 VITALS
SYSTOLIC BLOOD PRESSURE: 165 MMHG | DIASTOLIC BLOOD PRESSURE: 78 MMHG | TEMPERATURE: 98 F | OXYGEN SATURATION: 98 % | HEIGHT: 67 IN | BODY MASS INDEX: 27.78 KG/M2 | RESPIRATION RATE: 18 BRPM | HEART RATE: 78 BPM | WEIGHT: 177 LBS

## 2023-10-12 DIAGNOSIS — B02.9 HERPES ZOSTER WITHOUT COMPLICATION: Primary | ICD-10-CM

## 2023-10-12 LAB — POCT GLUCOSE: 220 MG/DL (ref 70–110)

## 2023-10-12 PROCEDURE — 82962 GLUCOSE BLOOD TEST: CPT | Mod: HCNC,ER

## 2023-10-12 PROCEDURE — 99283 EMERGENCY DEPT VISIT LOW MDM: CPT | Mod: HCNC,ER

## 2023-10-12 RX ORDER — DEXTROMETHORPHAN HYDROBROMIDE, GUAIFENESIN 5; 100 MG/5ML; MG/5ML
650 LIQUID ORAL EVERY 8 HOURS
Qty: 20 TABLET | Refills: 0 | Status: SHIPPED | OUTPATIENT
Start: 2023-10-12 | End: 2023-10-19

## 2023-10-12 RX ORDER — VALACYCLOVIR HYDROCHLORIDE 1 G/1
1000 TABLET, FILM COATED ORAL 3 TIMES DAILY
Qty: 21 TABLET | Refills: 0 | Status: SHIPPED | OUTPATIENT
Start: 2023-10-12 | End: 2023-11-03 | Stop reason: CLARIF

## 2023-10-12 NOTE — ED PROVIDER NOTES
Encounter Date: 10/12/2023       History     Chief Complaint   Patient presents with    Rash     Complains of painful rash to L side of abdomen x2 days.      72 y.o. female with a PMH of DM, HTN, Hyperlipidemia who presents to the Emergency Department with complaints of rash to left chest area for 2 days.  Patient states it burns, is painful, and itchy.  She denies fever, chest pain, SOB, numbness, weakness, tingling, abdominal pain, back pain, dysuria, hematuria, nausea, vomiting, diarrhea, or any other complaints.   She rates the pain as 8/10 and has taken tylenol with some relief.  No aggravating factors.                The history is provided by the patient.     Review of patient's allergies indicates:   Allergen Reactions    Novolin 70/30 (semi-synthetic) Nausea And Vomiting     Severe vomiting on Relion 70/30    Sulfa (sulfonamide antibiotics) Anaphylaxis    Talwin [pentazocine lactate] Anaphylaxis    Victoza [liraglutide] Nausea And Vomiting    Glipizide Nausea Only    Citric acid     Codeine     Influenza virus vaccines Hives    Iodine and iodide containing products Hives    Levetiracetam Itching    Rituxan [rituximab] Hives    Zoloft [sertraline] Nausea And Vomiting     Past Medical History:   Diagnosis Date    Allergy     Altered mental status 06/19/2022    DYSARTHRIA, SPASTIC MOVEMENTS & DIFFICULTY SWALLOWING    Anemia     Anxiety     Arthritis     Cataract     both removed    Colon polyps     Coronary artery disease     Depression     Diabetes mellitus, type II     Disorder of kidney and ureter     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/2023    Fibromyalgia     Follicular lymphoma     GERD (gastroesophageal reflux disease)     HTN (hypertension)     Hyperlipidemia     MI (myocardial infarction) 03/2019    Personal history of colonic polyps     Restless leg syndrome     Stroke      Past Surgical History:   Procedure Laterality Date    COLONOSCOPY  11/07/2012    Colon polyp found; repeat in 5 years    ELBOW  SURGERY Right 2015    dislocation repair     ESOPHAGOGASTRODUODENOSCOPY  11/07/2012    atrophic gastritis, H pylori testing negative    INCISION AND DRAINAGE FOOT Right 6/2/2021    Procedure: INCISION AND DRAINAGE, FOOT, bone biopsy;  Surgeon: Quiana Penn DPM;  Location: [facility] OR;  Service: Podiatry;  Laterality: Right;    KNEE SURGERY Bilateral 2015    scoped    LEFT HEART CATHETERIZATION Left 3/29/2019    Procedure: Left heart cath;  Surgeon: Bladimir Barbosa MD;  Location: [facility] CATH LAB;  Service: Cardiology;  Laterality: Left;    LEFT HEART CATHETERIZATION Left 11/18/2019    Procedure: Left heart cath;  Surgeon: Karl Rico MD;  Location: [facility] CATH LAB;  Service: Cardiology;  Laterality: Left;    LEFT HEART CATHETERIZATION Left 1/8/2020    Procedure: Left heart cath, right radial, noon start;  Surgeon: Christos Monreal MD;  Location: [facility] CATH LAB;  Service: Cardiology;  Laterality: Left;  RN Pre Op 1-6-20.  To be admitted 1-7-20 sor Aspirin Disensitation    TONSILLECTOMY  1955    ULTRASOUND GUIDANCE  1/8/2020    Procedure: Ultrasound Guidance;  Surgeon: Christos Monreal MD;  Location: [facility] CATH LAB;  Service: Cardiology;;     Family History   Problem Relation Age of Onset    Cancer Mother         colon    Heart disease Mother     Cancer Father         lung    Lung cancer Brother     Diabetes Sister     Hypertension Sister     Allergy (severe) Daughter     No Known Problems Daughter     Stroke Neg Hx     Hyperlipidemia Neg Hx      Social History     Tobacco Use    Smoking status: Never    Smokeless tobacco: Never   Substance Use Topics    Alcohol use: Not Currently    Drug use: Never     Review of Systems   Constitutional:  Negative for chills and fever.   HENT:  Negative for congestion, ear pain, rhinorrhea, sore throat and trouble swallowing.    Eyes:  Negative for pain, discharge and redness.   Respiratory:  Negative for cough and shortness of breath.    Cardiovascular:  Negative for chest pain.    Gastrointestinal:  Negative for abdominal pain, diarrhea, nausea and vomiting.   Genitourinary:  Negative for decreased urine volume and dysuria.   Musculoskeletal:  Negative for back pain, neck pain and neck stiffness.   Skin:  Positive for rash.   Neurological:  Negative for dizziness, weakness, light-headedness, numbness and headaches.   Psychiatric/Behavioral:  Negative for confusion.        Physical Exam     Initial Vitals [10/12/23 1145]   BP Pulse Resp Temp SpO2   (!) 196/72 85 18 98.2 °F (36.8 °C) 98 %      MAP       --         Physical Exam    Nursing note and vitals reviewed.  Constitutional: Vital signs are normal. She appears well-developed.  Non-toxic appearance. She does not appear ill.   HENT:   Head: Normocephalic and atraumatic.   Eyes: Conjunctivae are normal.   Neck:   Normal range of motion.  Cardiovascular:  Normal rate and regular rhythm.           Pulmonary/Chest: Effort normal and breath sounds normal. She exhibits no tenderness.   Abdominal: Abdomen is soft. There is no abdominal tenderness.   Musculoskeletal:      Cervical back: Normal range of motion.     Neurological: She is alert and oriented to person, place, and time. Gait normal. GCS eye subscore is 4. GCS verbal subscore is 5. GCS motor subscore is 6.   Skin: Skin is warm, dry and intact. Rash noted. Rash is vesicular.   Vesicular rash to left upper abdomen that wraps around to the left flank; does not cross the midline; no surrounding erythema; no drainage    Psychiatric: She has a normal mood and affect. Her speech is normal and behavior is normal. Judgment and thought content normal.         ED Course   Procedures  Labs Reviewed   POCT GLUCOSE - Abnormal; Notable for the following components:       Result Value    POCT Glucose 220 (*)     All other components within normal limits   POCT GLUCOSE, HAND-HELD DEVICE          Imaging Results    None          Medications - No data to display  Medical Decision Making  This is an urgent  evaluation of a 72 y.o. female that presents to the Emergency Department for rash.  The patient is a non-toxic, afebrile, and well appearing female. On physical exam, there is a Vesicular rash to left upper abdomen that wraps around to the left flank; does not cross the midline; no surrounding erythema; no drainage; she has no oral mucosa lesions, desquamation, or sloughing of the skin.     Vital Signs: 165/78, 98.2, 78, 18, 98%   If available, previous records reviewed.   I ordered labs and personally reviewed them.  Labs significant for     Given the above findings, my overall impression is shingles. Given the above findings, I do not think the patients rash is a result of Hand, Foot, and Mouth, Allergic reaction, Chicken Pox, meningococcemia, TENs, or SJS.    The patient will be discharged home with Valcyclovir. Additional home care recommendations include Tylenol, Hydration. The diagnosis, treatment plan, instructions for follow-up, strict return precautions, and reevaluation with her PCP as well as ED return precautions have been discussed with the patient and she has verbalized an understanding of the information.  All questions or concerns from the patient have been addressed.         Amount and/or Complexity of Data Reviewed  Labs: ordered. Decision-making details documented in ED Course.    Risk  OTC drugs.  Prescription drug management.                               Clinical Impression:   Final diagnoses:  [B02.9] Herpes zoster without complication (Primary)        ED Disposition Condition    Discharge Stable          ED Prescriptions       Medication Sig Dispense Start Date End Date Auth. Provider    valACYclovir (VALTREX) 1000 MG tablet Take 1 tablet (1,000 mg total) by mouth 3 (three) times daily. for 7 days 21 tablet 10/12/2023 10/19/2023 Catie Calixto FNP    acetaminophen (TYLENOL) 650 MG TbSR Take 1 tablet (650 mg total) by mouth every 8 (eight) hours. for 7 days 20 tablet 10/12/2023  10/19/2023 Catie Calixto, ESCOBAR          Follow-up Information       Follow up With Specialties Details Why Contact Info    Jorge Paris MD Internal Medicine, Pediatrics Schedule an appointment as soon as possible for a visit in 2 days  [address] [address]  [phone]      [facility] ED Emergency Medicine Go to  If symptoms worsen [address] [facility] [id number] [phone]             Catie Calixto, ESCOBAR  10/12/23 1225

## 2023-10-12 NOTE — DISCHARGE INSTRUCTIONS
§ Please return to the Emergency Department for any new or worsening symptoms including: fever, chest pain, shortness of breath, loss of consciousness, dizziness, weakness, or any other concerns.     § Schedule an appointment for follow up with your Primary Care Doctor as soon as possible for a recheck of your symptoms. If you do not have one, contact the one listed on your discharge paperwork or call the [facility] Appointment Desk at [phone] to schedule an appointment.     § If you require follow up care from a specialist and are unable to schedule an appointment with them directly, please contact your Primary Care Provider on the next business day to set up a referral.      § Please take all medication as prescribed.

## 2023-11-03 ENCOUNTER — HOSPITAL ENCOUNTER (OUTPATIENT)
Facility: HOSPITAL | Age: 73
Discharge: HOME | End: 2023-11-06
Attending: STUDENT IN AN ORGANIZED HEALTH CARE EDUCATION/TRAINING PROGRAM | Admitting: STUDENT IN AN ORGANIZED HEALTH CARE EDUCATION/TRAINING PROGRAM
Payer: MEDICARE

## 2023-11-03 DIAGNOSIS — F41.9 ANXIETY: ICD-10-CM

## 2023-11-03 DIAGNOSIS — R79.89 ELEVATED TROPONIN: ICD-10-CM

## 2023-11-03 DIAGNOSIS — R07.9 CHEST PAIN, UNSPECIFIED TYPE: ICD-10-CM

## 2023-11-03 DIAGNOSIS — R06.02 SHORTNESS OF BREATH: ICD-10-CM

## 2023-11-03 DIAGNOSIS — R73.9 HYPERGLYCEMIA: ICD-10-CM

## 2023-11-03 DIAGNOSIS — R07.9 CHEST PAIN: ICD-10-CM

## 2023-11-03 DIAGNOSIS — B02.9 HERPES ZOSTER WITHOUT COMPLICATION: Primary | ICD-10-CM

## 2023-11-03 PROBLEM — N30.00 ACUTE CYSTITIS: Status: ACTIVE | Noted: 2023-11-03

## 2023-11-03 PROBLEM — N18.31 STAGE 3A CHRONIC KIDNEY DISEASE: Chronic | Status: ACTIVE | Noted: 2019-12-10

## 2023-11-03 PROBLEM — I21.19 ACUTE INFERIOR MYOCARDIAL INFARCTION: Status: RESOLVED | Noted: 2019-03-29 | Resolved: 2020-02-06

## 2023-11-03 PROBLEM — E11.29 PERSISTENT MICROALBUMINURIA ASSOCIATED WITH TYPE 2 DIABETES MELLITUS: Status: RESOLVED | Noted: 2017-05-05 | Resolved: 2021-08-10

## 2023-11-03 PROBLEM — I25.118 CORONARY ARTERY DISEASE OF NATIVE ARTERY OF NATIVE HEART WITH STABLE ANGINA PECTORIS: Chronic | Status: ACTIVE | Noted: 2019-03-29

## 2023-11-03 PROBLEM — R80.9 PERSISTENT MICROALBUMINURIA ASSOCIATED WITH TYPE 2 DIABETES MELLITUS: Status: RESOLVED | Noted: 2017-05-05 | Resolved: 2021-08-10

## 2023-11-03 PROBLEM — I50.32 CHRONIC DIASTOLIC HEART FAILURE: Chronic | Status: ACTIVE | Noted: 2021-08-10

## 2023-11-03 PROBLEM — B02.29 POST HERPETIC NEURALGIA: Status: ACTIVE | Noted: 2023-11-03

## 2023-11-03 LAB
ALBUMIN SERPL BCP-MCNC: 3.2 G/DL (ref 3.5–5.2)
ALP SERPL-CCNC: 175 U/L (ref 55–135)
ALT SERPL W/O P-5'-P-CCNC: 53 U/L (ref 10–44)
ANION GAP SERPL CALC-SCNC: 12 MMOL/L (ref 8–16)
AST SERPL-CCNC: 41 U/L (ref 10–40)
BACTERIA #/AREA URNS HPF: ABNORMAL /HPF
BASOPHILS # BLD AUTO: 0.09 K/UL (ref 0–0.2)
BASOPHILS NFR BLD: 1.1 % (ref 0–1.9)
BILIRUB SERPL-MCNC: 0.4 MG/DL (ref 0.1–1)
BILIRUB UR QL STRIP: NEGATIVE
BUN SERPL-MCNC: 32 MG/DL (ref 8–23)
CALCIUM SERPL-MCNC: 9.4 MG/DL (ref 8.7–10.5)
CHLORIDE SERPL-SCNC: 104 MMOL/L (ref 95–110)
CLARITY UR: ABNORMAL
CO2 SERPL-SCNC: 19 MMOL/L (ref 23–29)
COLOR UR: YELLOW
CREAT SERPL-MCNC: 1.5 MG/DL (ref 0.5–1.4)
DIFFERENTIAL METHOD: ABNORMAL
EOSINOPHIL # BLD AUTO: 0.1 K/UL (ref 0–0.5)
EOSINOPHIL NFR BLD: 1.1 % (ref 0–8)
ERYTHROCYTE [DISTWIDTH] IN BLOOD BY AUTOMATED COUNT: 14.7 % (ref 11.5–14.5)
EST. GFR  (NO RACE VARIABLE): 37 ML/MIN/1.73 M^2
GLUCOSE SERPL-MCNC: 319 MG/DL (ref 70–110)
GLUCOSE UR QL STRIP: ABNORMAL
HCT VFR BLD AUTO: 37.2 % (ref 37–48.5)
HGB BLD-MCNC: 12.2 G/DL (ref 12–16)
HGB UR QL STRIP: ABNORMAL
HYALINE CASTS #/AREA URNS LPF: 0 /LPF
IMM GRANULOCYTES # BLD AUTO: 0.03 K/UL (ref 0–0.04)
IMM GRANULOCYTES NFR BLD AUTO: 0.4 % (ref 0–0.5)
KETONES UR QL STRIP: NEGATIVE
LEUKOCYTE ESTERASE UR QL STRIP: ABNORMAL
LIPASE SERPL-CCNC: 19 U/L (ref 4–60)
LYMPHOCYTES # BLD AUTO: 1.2 K/UL (ref 1–4.8)
LYMPHOCYTES NFR BLD: 14.9 % (ref 18–48)
MCH RBC QN AUTO: 28.7 PG (ref 27–31)
MCHC RBC AUTO-ENTMCNC: 32.8 G/DL (ref 32–36)
MCV RBC AUTO: 88 FL (ref 82–98)
MICROSCOPIC COMMENT: ABNORMAL
MONOCYTES # BLD AUTO: 0.5 K/UL (ref 0.3–1)
MONOCYTES NFR BLD: 5.4 % (ref 4–15)
NEUTROPHILS # BLD AUTO: 6.4 K/UL (ref 1.8–7.7)
NEUTROPHILS NFR BLD: 77.1 % (ref 38–73)
NITRITE UR QL STRIP: POSITIVE
NON-SQ EPI CELLS #/AREA URNS HPF: 1 /HPF
NRBC BLD-RTO: 0 /100 WBC
PH UR STRIP: 8 [PH] (ref 5–8)
PLATELET # BLD AUTO: 254 K/UL (ref 150–450)
PMV BLD AUTO: 12.4 FL (ref 9.2–12.9)
POCT GLUCOSE: 117 MG/DL (ref 70–110)
POCT GLUCOSE: 140 MG/DL (ref 70–110)
POCT GLUCOSE: 354 MG/DL (ref 70–110)
POTASSIUM SERPL-SCNC: 4 MMOL/L (ref 3.5–5.1)
PROT SERPL-MCNC: 7.2 G/DL (ref 6–8.4)
PROT UR QL STRIP: ABNORMAL
RBC # BLD AUTO: 4.25 M/UL (ref 4–5.4)
RBC #/AREA URNS HPF: >100 /HPF (ref 0–4)
SODIUM SERPL-SCNC: 135 MMOL/L (ref 136–145)
SP GR UR STRIP: 1.01 (ref 1–1.03)
SQUAMOUS #/AREA URNS HPF: 2 /HPF
TRI-PHOS CRY URNS QL MICRO: ABNORMAL
TROPONIN I SERPL DL<=0.01 NG/ML-MCNC: 0.03 NG/ML (ref 0–0.03)
TROPONIN I SERPL DL<=0.01 NG/ML-MCNC: 0.04 NG/ML (ref 0–0.03)
URN SPEC COLLECT METH UR: ABNORMAL
UROBILINOGEN UR STRIP-ACNC: NEGATIVE EU/DL
WBC # BLD AUTO: 8.32 K/UL (ref 3.9–12.7)
WBC #/AREA URNS HPF: 34 /HPF (ref 0–5)

## 2023-11-03 PROCEDURE — 96372 THER/PROPH/DIAG INJ SC/IM: CPT | Mod: 59 | Performed by: HOSPITALIST

## 2023-11-03 PROCEDURE — 25000003 PHARM REV CODE 250: Mod: HCNC | Performed by: HOSPITALIST

## 2023-11-03 PROCEDURE — 96365 THER/PROPH/DIAG IV INF INIT: CPT | Mod: HCNC

## 2023-11-03 PROCEDURE — 63600175 PHARM REV CODE 636 W HCPCS: Mod: HCNC | Performed by: HOSPITALIST

## 2023-11-03 PROCEDURE — 87077 CULTURE AEROBIC IDENTIFY: CPT | Mod: HCNC | Performed by: NURSE PRACTITIONER

## 2023-11-03 PROCEDURE — 83690 ASSAY OF LIPASE: CPT | Mod: HCNC | Performed by: NURSE PRACTITIONER

## 2023-11-03 PROCEDURE — 93005 ELECTROCARDIOGRAM TRACING: CPT | Mod: HCNC

## 2023-11-03 PROCEDURE — 84484 ASSAY OF TROPONIN QUANT: CPT | Mod: HCNC | Performed by: NURSE PRACTITIONER

## 2023-11-03 PROCEDURE — 93010 ELECTROCARDIOGRAM REPORT: CPT | Mod: HCNC,,, | Performed by: INTERNAL MEDICINE

## 2023-11-03 PROCEDURE — 80053 COMPREHEN METABOLIC PANEL: CPT | Mod: HCNC | Performed by: NURSE PRACTITIONER

## 2023-11-03 PROCEDURE — 96375 TX/PRO/DX INJ NEW DRUG ADDON: CPT | Mod: HCNC

## 2023-11-03 PROCEDURE — G0378 HOSPITAL OBSERVATION PER HR: HCPCS | Mod: HCNC

## 2023-11-03 PROCEDURE — 85025 COMPLETE CBC W/AUTO DIFF WBC: CPT | Mod: HCNC | Performed by: NURSE PRACTITIONER

## 2023-11-03 PROCEDURE — 96361 HYDRATE IV INFUSION ADD-ON: CPT | Mod: HCNC

## 2023-11-03 PROCEDURE — 25000003 PHARM REV CODE 250: Mod: HCNC | Performed by: STUDENT IN AN ORGANIZED HEALTH CARE EDUCATION/TRAINING PROGRAM

## 2023-11-03 PROCEDURE — 87186 SC STD MICRODIL/AGAR DIL: CPT | Mod: HCNC | Performed by: NURSE PRACTITIONER

## 2023-11-03 PROCEDURE — 99285 EMERGENCY DEPT VISIT HI MDM: CPT | Mod: 25,HCNC

## 2023-11-03 PROCEDURE — 63600175 PHARM REV CODE 636 W HCPCS: Mod: HCNC | Performed by: STUDENT IN AN ORGANIZED HEALTH CARE EDUCATION/TRAINING PROGRAM

## 2023-11-03 PROCEDURE — 82962 GLUCOSE BLOOD TEST: CPT | Mod: HCNC

## 2023-11-03 PROCEDURE — 81000 URINALYSIS NONAUTO W/SCOPE: CPT | Mod: HCNC | Performed by: NURSE PRACTITIONER

## 2023-11-03 PROCEDURE — 63600175 PHARM REV CODE 636 W HCPCS: Mod: HCNC | Performed by: NURSE PRACTITIONER

## 2023-11-03 PROCEDURE — 84484 ASSAY OF TROPONIN QUANT: CPT | Mod: 91,HCNC | Performed by: STUDENT IN AN ORGANIZED HEALTH CARE EDUCATION/TRAINING PROGRAM

## 2023-11-03 PROCEDURE — 87088 URINE BACTERIA CULTURE: CPT | Mod: HCNC | Performed by: NURSE PRACTITIONER

## 2023-11-03 PROCEDURE — 87086 URINE CULTURE/COLONY COUNT: CPT | Mod: HCNC | Performed by: NURSE PRACTITIONER

## 2023-11-03 PROCEDURE — 93010 EKG 12-LEAD: ICD-10-PCS | Mod: HCNC,,, | Performed by: INTERNAL MEDICINE

## 2023-11-03 RX ORDER — HYDRALAZINE HYDROCHLORIDE 25 MG/1
50 TABLET, FILM COATED ORAL 2 TIMES DAILY
Status: DISCONTINUED | OUTPATIENT
Start: 2023-11-03 | End: 2023-11-05

## 2023-11-03 RX ORDER — TRAMADOL HYDROCHLORIDE 50 MG/1
50 TABLET ORAL
Status: DISCONTINUED | OUTPATIENT
Start: 2023-11-03 | End: 2023-11-03

## 2023-11-03 RX ORDER — POLYETHYLENE GLYCOL 3350 17 G/17G
17 POWDER, FOR SOLUTION ORAL DAILY
Status: DISCONTINUED | OUTPATIENT
Start: 2023-11-04 | End: 2023-11-06 | Stop reason: HOSPADM

## 2023-11-03 RX ORDER — LIDOCAINE 50 MG/G
1 PATCH TOPICAL
Status: DISCONTINUED | OUTPATIENT
Start: 2023-11-03 | End: 2023-11-06 | Stop reason: HOSPADM

## 2023-11-03 RX ORDER — NALOXONE HCL 0.4 MG/ML
0.02 VIAL (ML) INJECTION
Status: DISCONTINUED | OUTPATIENT
Start: 2023-11-03 | End: 2023-11-06 | Stop reason: HOSPADM

## 2023-11-03 RX ORDER — PROCHLORPERAZINE EDISYLATE 5 MG/ML
5 INJECTION INTRAMUSCULAR; INTRAVENOUS EVERY 6 HOURS PRN
Status: DISCONTINUED | OUTPATIENT
Start: 2023-11-03 | End: 2023-11-06 | Stop reason: HOSPADM

## 2023-11-03 RX ORDER — ISOSORBIDE MONONITRATE 30 MG/1
30 TABLET, EXTENDED RELEASE ORAL DAILY
Status: DISCONTINUED | OUTPATIENT
Start: 2023-11-04 | End: 2023-11-06 | Stop reason: HOSPADM

## 2023-11-03 RX ORDER — TALC
6 POWDER (GRAM) TOPICAL NIGHTLY PRN
Status: DISCONTINUED | OUTPATIENT
Start: 2023-11-03 | End: 2023-11-06 | Stop reason: HOSPADM

## 2023-11-03 RX ORDER — FLUOXETINE HYDROCHLORIDE 20 MG/1
40 CAPSULE ORAL DAILY
Status: DISCONTINUED | OUTPATIENT
Start: 2023-11-04 | End: 2023-11-06 | Stop reason: HOSPADM

## 2023-11-03 RX ORDER — ATORVASTATIN CALCIUM 40 MG/1
80 TABLET, FILM COATED ORAL NIGHTLY
Status: DISCONTINUED | OUTPATIENT
Start: 2023-11-03 | End: 2023-11-06 | Stop reason: HOSPADM

## 2023-11-03 RX ORDER — IBUPROFEN 200 MG
24 TABLET ORAL
Status: DISCONTINUED | OUTPATIENT
Start: 2023-11-03 | End: 2023-11-06 | Stop reason: HOSPADM

## 2023-11-03 RX ORDER — MAG HYDROX/ALUMINUM HYD/SIMETH 200-200-20
30 SUSPENSION, ORAL (FINAL DOSE FORM) ORAL 4 TIMES DAILY PRN
Status: DISCONTINUED | OUTPATIENT
Start: 2023-11-03 | End: 2023-11-06 | Stop reason: HOSPADM

## 2023-11-03 RX ORDER — GABAPENTIN 300 MG/1
300 CAPSULE ORAL
Status: COMPLETED | OUTPATIENT
Start: 2023-11-03 | End: 2023-11-03

## 2023-11-03 RX ORDER — ONDANSETRON 2 MG/ML
4 INJECTION INTRAMUSCULAR; INTRAVENOUS EVERY 8 HOURS PRN
Status: DISCONTINUED | OUTPATIENT
Start: 2023-11-03 | End: 2023-11-06 | Stop reason: HOSPADM

## 2023-11-03 RX ORDER — ENOXAPARIN SODIUM 100 MG/ML
40 INJECTION SUBCUTANEOUS EVERY 24 HOURS
Status: DISCONTINUED | OUTPATIENT
Start: 2023-11-03 | End: 2023-11-06 | Stop reason: HOSPADM

## 2023-11-03 RX ORDER — INSULIN ASPART 100 [IU]/ML
0-10 INJECTION, SOLUTION INTRAVENOUS; SUBCUTANEOUS
Status: DISCONTINUED | OUTPATIENT
Start: 2023-11-03 | End: 2023-11-06 | Stop reason: HOSPADM

## 2023-11-03 RX ORDER — METOPROLOL SUCCINATE 50 MG/1
50 TABLET, EXTENDED RELEASE ORAL DAILY
Status: DISCONTINUED | OUTPATIENT
Start: 2023-11-04 | End: 2023-11-06 | Stop reason: HOSPADM

## 2023-11-03 RX ORDER — GLUCAGON 1 MG
1 KIT INJECTION
Status: DISCONTINUED | OUTPATIENT
Start: 2023-11-03 | End: 2023-11-06 | Stop reason: HOSPADM

## 2023-11-03 RX ORDER — IBUPROFEN 200 MG
16 TABLET ORAL
Status: DISCONTINUED | OUTPATIENT
Start: 2023-11-03 | End: 2023-11-06 | Stop reason: HOSPADM

## 2023-11-03 RX ORDER — NAPROXEN SODIUM 220 MG/1
81 TABLET, FILM COATED ORAL DAILY
Status: DISCONTINUED | OUTPATIENT
Start: 2023-11-04 | End: 2023-11-06 | Stop reason: HOSPADM

## 2023-11-03 RX ORDER — ACETAMINOPHEN 325 MG/1
650 TABLET ORAL EVERY 6 HOURS PRN
Status: DISCONTINUED | OUTPATIENT
Start: 2023-11-03 | End: 2023-11-04

## 2023-11-03 RX ORDER — GABAPENTIN 300 MG/1
300 CAPSULE ORAL 3 TIMES DAILY
Status: DISCONTINUED | OUTPATIENT
Start: 2023-11-03 | End: 2023-11-06 | Stop reason: HOSPADM

## 2023-11-03 RX ORDER — SODIUM CHLORIDE 0.9 % (FLUSH) 0.9 %
10 SYRINGE (ML) INJECTION EVERY 8 HOURS PRN
Status: DISCONTINUED | OUTPATIENT
Start: 2023-11-03 | End: 2023-11-06 | Stop reason: HOSPADM

## 2023-11-03 RX ORDER — SIMETHICONE 80 MG
1 TABLET,CHEWABLE ORAL 4 TIMES DAILY PRN
Status: DISCONTINUED | OUTPATIENT
Start: 2023-11-03 | End: 2023-11-06 | Stop reason: HOSPADM

## 2023-11-03 RX ORDER — ONDANSETRON 2 MG/ML
4 INJECTION INTRAMUSCULAR; INTRAVENOUS
Status: COMPLETED | OUTPATIENT
Start: 2023-11-03 | End: 2023-11-03

## 2023-11-03 RX ADMIN — TICAGRELOR 90 MG: 90 TABLET ORAL at 09:11

## 2023-11-03 RX ADMIN — GABAPENTIN 300 MG: 300 CAPSULE ORAL at 09:11

## 2023-11-03 RX ADMIN — SODIUM CHLORIDE, POTASSIUM CHLORIDE, SODIUM LACTATE AND CALCIUM CHLORIDE 1000 ML: 600; 310; 30; 20 INJECTION, SOLUTION INTRAVENOUS at 01:11

## 2023-11-03 RX ADMIN — ENOXAPARIN SODIUM 40 MG: 40 INJECTION SUBCUTANEOUS at 09:11

## 2023-11-03 RX ADMIN — CEFTRIAXONE 1 G: 1 INJECTION, POWDER, FOR SOLUTION INTRAMUSCULAR; INTRAVENOUS at 06:11

## 2023-11-03 RX ADMIN — HYDRALAZINE HYDROCHLORIDE 50 MG: 25 TABLET, FILM COATED ORAL at 09:11

## 2023-11-03 RX ADMIN — ATORVASTATIN CALCIUM 80 MG: 40 TABLET, FILM COATED ORAL at 09:11

## 2023-11-03 RX ADMIN — ACETAMINOPHEN 650 MG: 325 TABLET ORAL at 09:11

## 2023-11-03 RX ADMIN — GABAPENTIN 300 MG: 300 CAPSULE ORAL at 01:11

## 2023-11-03 RX ADMIN — ONDANSETRON 4 MG: 2 INJECTION INTRAMUSCULAR; INTRAVENOUS at 01:11

## 2023-11-03 RX ADMIN — LIDOCAINE 1 PATCH: 700 PATCH TOPICAL at 01:11

## 2023-11-03 RX ADMIN — Medication 6 MG: at 10:11

## 2023-11-03 NOTE — ED PROVIDER NOTES
"Encounter Date: 11/3/2023    SCRIBE #1 NOTE: I, Danuta Ferrera, am scribing for, and in the presence of,  Emmett Quintanilla MD.       History     Chief Complaint   Patient presents with    Emesis     Pt to ER with reports of N/V with loss of appetite since yesterday. PT reports amarilys SOB. Pt also reports current diagnosis of shingles under left breast wrapping around towards back. Pt rates pain 10/10     74 yo F with a PMHx of DM, GERD, HTN, HLD, CAD (s/p JESIKA x 2 to RCA 3/29/19 - JESIKA to RI and Cx 1/8/20) presenting to the ED for nausea, vomiting, intractable pain. Per chart review, she was seen at another facility on 10/12/23 for a vesicular rash to her L upper abdomen radiating to the L flank, consistent with shingles. She was given Tylenol and 7 day course of Valacyclovir which she was adherent with. However, since then, her blistering rash has mostly resolved, but her pain persisted, currently rating 10/10 in severity. She notes OSMAN d/t her intractable pain. She has tried OTC ointments and creams as well without relief. She reports onset of nausea, nonbloody vomiting, inability to tolerate PO since 2 days ago, and a "bubble-like" sensation to the left upper abdomen yesterday. She expresses concern she is developing an additional viral infection. She further notes a headache, dysuria. No other exacerbating or alleviating factors. Denies vision changes, melena, hematochezia, hematemesis, or other associated symptoms. She endorses adherence with her insulin but states her blood glucose has been running high at home since her shingles diagnosis.     The history is provided by the patient and medical records.     Review of patient's allergies indicates:   Allergen Reactions    Novolin 70/30 (semi-synthetic) Nausea And Vomiting     Severe vomiting on Relion 70/30    Sulfa (sulfonamide antibiotics) Anaphylaxis    Talwin [pentazocine lactate] Anaphylaxis    Victoza [liraglutide] Nausea And Vomiting    Glipizide Nausea Only    " Citric acid     Codeine     Influenza virus vaccines Hives    Iodine and iodide containing products Hives    Levetiracetam Itching    Rituxan [rituximab] Hives    Zoloft [sertraline] Nausea And Vomiting     Past Medical History:   Diagnosis Date    Allergy     Altered mental status 06/19/2022    DYSARTHRIA, SPASTIC MOVEMENTS & DIFFICULTY SWALLOWING    Anemia     Anxiety     Arthritis     Cataract     both removed    Colon polyps     Coronary artery disease     Depression     Diabetes mellitus, type II     Disorder of kidney and ureter     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/2023    Fibromyalgia     Follicular lymphoma     GERD (gastroesophageal reflux disease)     HTN (hypertension)     Hyperlipidemia     MI (myocardial infarction) 03/2019    Personal history of colonic polyps     Restless leg syndrome     Stroke      Past Surgical History:   Procedure Laterality Date    COLONOSCOPY  11/07/2012    Colon polyp found; repeat in 5 years    ELBOW SURGERY Right 2015    dislocation repair     ESOPHAGOGASTRODUODENOSCOPY  11/07/2012    atrophic gastritis, H pylori testing negative    INCISION AND DRAINAGE FOOT Right 6/2/2021    Procedure: INCISION AND DRAINAGE, FOOT, bone biopsy;  Surgeon: Quiana Penn DPM;  Location: [facility] OR;  Service: Podiatry;  Laterality: Right;    KNEE SURGERY Bilateral 2015    scoped    LEFT HEART CATHETERIZATION Left 3/29/2019    Procedure: Left heart cath;  Surgeon: Bladimir Barbosa MD;  Location: [facility] CATH LAB;  Service: Cardiology;  Laterality: Left;    LEFT HEART CATHETERIZATION Left 11/18/2019    Procedure: Left heart cath;  Surgeon: Karl Rico MD;  Location: [facility] CATH LAB;  Service: Cardiology;  Laterality: Left;    LEFT HEART CATHETERIZATION Left 1/8/2020    Procedure: Left heart cath, right radial, noon start;  Surgeon: Christos Monreal MD;  Location: [facility] CATH LAB;  Service: Cardiology;  Laterality: Left;  RN Pre Op 1-6-20.  To be admitted 1-7-20 sor Aspirin Disensitation     TONSILLECTOMY  1955    ULTRASOUND GUIDANCE  1/8/2020    Procedure: Ultrasound Guidance;  Surgeon: Christos Monreal MD;  Location: [facility] CATH LAB;  Service: Cardiology;;     Family History   Problem Relation Age of Onset    Cancer Mother         colon    Heart disease Mother     Cancer Father         lung    Lung cancer Brother     Diabetes Sister     Hypertension Sister     Allergy (severe) Daughter     No Known Problems Daughter     Stroke Neg Hx     Hyperlipidemia Neg Hx      Social History     Tobacco Use    Smoking status: Never    Smokeless tobacco: Never   Substance Use Topics    Alcohol use: Not Currently    Drug use: Never     Review of Systems    Physical Exam     Initial Vitals [11/03/23 1250]   BP Pulse Resp Temp SpO2   135/60 91 18 97.8 °F (36.6 °C) 100 %      MAP       --         Physical Exam    Nursing note and vitals reviewed.  Constitutional: She appears well-developed and well-nourished. She is not diaphoretic. No distress.   HENT:   Head: Normocephalic and atraumatic.   Eyes: Conjunctivae and EOM are normal. Pupils are equal, round, and reactive to light.   Neck: Neck supple.   Normal range of motion.  Cardiovascular:  Normal rate, regular rhythm, normal heart sounds and intact distal pulses.           No murmur heard.  Pulmonary/Chest: Breath sounds normal. No respiratory distress. She has no wheezes. She has no rhonchi. She has no rales.   See Skin   Abdominal: Abdomen is soft. She exhibits no distension. There is no abdominal tenderness. There is no rebound and no guarding.   Musculoskeletal:         General: No tenderness or edema.      Cervical back: Normal range of motion and neck supple.     Neurological: She is alert and oriented to person, place, and time. She has normal strength. No sensory deficit. GCS score is 15. GCS eye subscore is 4. GCS verbal subscore is 5. GCS motor subscore is 6.   Skin: Skin is warm and dry. Capillary refill takes less than 2 seconds.   Faint rash along  dermatomal distribution on left midback that wraps around to front, does not cross midline. Hyperesthesia          ED Course   Procedures  Labs Reviewed   CBC W/ AUTO DIFFERENTIAL - Abnormal; Notable for the following components:       Result Value    RDW 14.7 (*)     Gran % 77.1 (*)     Lymph % 14.9 (*)     All other components within normal limits   COMPREHENSIVE METABOLIC PANEL - Abnormal; Notable for the following components:    Sodium 135 (*)     CO2 19 (*)     Glucose 319 (*)     BUN 32 (*)     Creatinine 1.5 (*)     Albumin 3.2 (*)     Alkaline Phosphatase 175 (*)     AST 41 (*)     ALT 53 (*)     eGFR 37 (*)     All other components within normal limits   URINALYSIS, REFLEX TO URINE CULTURE - Abnormal; Notable for the following components:    Appearance, UA Hazy (*)     Protein, UA 2+ (*)     Glucose, UA 2+ (*)     Occult Blood UA 3+ (*)     Nitrite, UA Positive (*)     Leukocytes, UA 2+ (*)     All other components within normal limits    Narrative:     Specimen Source->Urine   TROPONIN I - Abnormal; Notable for the following components:    Troponin I 0.027 (*)     All other components within normal limits   TROPONIN I - Abnormal; Notable for the following components:    Troponin I 0.035 (*)     All other components within normal limits   URINALYSIS MICROSCOPIC - Abnormal; Notable for the following components:    RBC, UA >100 (*)     WBC, UA 34 (*)     Bacteria Many (*)     Non-Squam Epith 1 (*)     All other components within normal limits    Narrative:     Specimen Source->Urine   TROPONIN I - Abnormal; Notable for the following components:    Troponin I 0.041 (*)     All other components within normal limits   POCT GLUCOSE - Abnormal; Notable for the following components:    POCT Glucose 354 (*)     All other components within normal limits   POCT GLUCOSE - Abnormal; Notable for the following components:    POCT Glucose 140 (*)     All other components within normal limits   POCT GLUCOSE - Abnormal;  Notable for the following components:    POCT Glucose 117 (*)     All other components within normal limits   CULTURE, URINE   LIPASE   TROPONIN I   POCT GLUCOSE MONITORING CONTINUOUS   POCT GLUCOSE MONITORING CONTINUOUS          Imaging Results              X-Ray Chest AP Portable (Final result)  Result time 11/03/23 15:24:51      Final result by Kenneth Caceres MD (11/03/23 15:24:51)                   Impression:      No radiographic evidence of pneumonia or other source of shortness of breath on this single view, noting that early/mild viral pneumonia or nonspecific bronchitis may be radiographically occult.      Electronically signed by: Kenneth Caceres MD  Date:    11/03/2023  Time:    15:24               Narrative:    EXAMINATION:  XR CHEST AP PORTABLE    CLINICAL HISTORY:  Shortness of breath    TECHNIQUE:  Single frontal view of the chest was performed.    COMPARISON:  Chest radiograph 04/09/2023, PET CT 03/28/2014    FINDINGS:  Monitoring leads overlie the chest.  Patient is rotated.    Cardiomediastinal silhouette is midline with similar calcification and mild tortuosity of the aorta and upper limits of normal sized heart.  Pulmonary vasculature and hilar contours are within normal limits.  Scattered linear opacities throughout the lungs consistent with mild platelike scarring versus atelectasis.  The lungs are otherwise well expanded without consolidation, pleural effusion or pneumothorax.  No acute osseous process seen.  PA and lateral views can be obtained.                                       Medications   LIDOcaine 5 % patch 1 patch (1 patch Transdermal Patch Applied 11/3/23 1358)   aspirin chewable tablet 81 mg (has no administration in time range)   atorvastatin tablet 80 mg (80 mg Oral Given 11/3/23 2118)   FLUoxetine capsule 40 mg (has no administration in time range)   hydrALAZINE tablet 50 mg (50 mg Oral Given 11/3/23 2118)   isosorbide mononitrate 24 hr tablet 30 mg (has no administration in  time range)   metoprolol succinate (TOPROL-XL) 24 hr tablet 50 mg (has no administration in time range)   ticagrelor tablet 90 mg (90 mg Oral Given 11/3/23 2119)   sodium chloride 0.9% flush 10 mL (has no administration in time range)   melatonin tablet 6 mg (has no administration in time range)   ondansetron injection 4 mg (has no administration in time range)   prochlorperazine injection Soln 5 mg (has no administration in time range)   polyethylene glycol packet 17 g (has no administration in time range)   acetaminophen tablet 650 mg (650 mg Oral Given 11/3/23 2118)   simethicone chewable tablet 80 mg (has no administration in time range)   aluminum-magnesium hydroxide-simethicone 200-200-20 mg/5 mL suspension 30 mL (has no administration in time range)   naloxone 0.4 mg/mL injection 0.02 mg (has no administration in time range)   glucose chewable tablet 16 g (has no administration in time range)   glucose chewable tablet 24 g (has no administration in time range)   glucagon (human recombinant) injection 1 mg (has no administration in time range)   enoxaparin injection 40 mg (40 mg Subcutaneous Given 11/3/23 2121)   insulin aspart U-100 pen 0-10 Units (has no administration in time range)   dextrose 10% bolus 125 mL 125 mL (has no administration in time range)   dextrose 10% bolus 250 mL 250 mL (has no administration in time range)   gabapentin capsule 300 mg (300 mg Oral Given 11/3/23 2119)   cefTRIAXone (ROCEPHIN) 1 g in dextrose 5 % in water (D5W) 100 mL IVPB (MB+) (has no administration in time range)   lactated ringers bolus 1,000 mL (0 mLs Intravenous Stopped 11/3/23 1438)   ondansetron injection 4 mg (4 mg Intravenous Given 11/3/23 1348)   gabapentin capsule 300 mg (300 mg Oral Given 11/3/23 1348)   cefTRIAXone (ROCEPHIN) 1 g in dextrose 5 % in water (D5W) 100 mL IVPB (MB+) (0 g Intravenous Stopped 11/3/23 1851)     Medical Decision Making  Afebrile and hemodynamically stable 73-year-old female presents  with persistent pain where she recently had shingles in addition to 2 days of vomiting    Differential:  Post herpetic neuralgia, shingles, pancreatitis, UTI, electrolyte abnormality, KYA, DKA, hyperglycemia    Patient unable to have any opiate pain medication she reports.  Will initiate pain control with gabapentin and a lidocaine patch.  We will also give 1 L normal saline and Zofran.  See ED course for additional information.    Amount and/or Complexity of Data Reviewed  External Data Reviewed: notes.     Details: See HPI    TTE 8/22/23  Left Ventricle: The left ventricle is normal in size. Mildly increased wall thickness. There is concentric remodeling. There is normal systolic function with a visually estimated ejection fraction of 55 - 60%. Grade III diastolic dysfunction.  ·  Left Atrium: Left atrium is severely dilated.  ·  Right Ventricle: Mild right ventricular enlargement. Systolic function is normal.  ·  Right Atrium: Right atrium is moderately dilated.  ·  Aortic Valve: The aortic valve is a trileaflet valve. Moderately restricted motion. There is moderate stenosis. Aortic valve area by VTI is 1.01 cm². Aortic valve peak velocity is 3.40 m/s. Mean gradient is 29 mmHg. The dimensionless index is 0.29.  ·  Mitral Valve: Moderate mitral annular calcification. There is moderate regurgitation.  ·  Tricuspid Valve: There is moderate regurgitation.  ·  Pulmonary Artery: The estimated pulmonary artery systolic pressure is 46 mmHg.  ·  IVC/SVC: Normal venous pressure at 3 mmHg.       Labs: ordered. Decision-making details documented in ED Course.  Radiology: ordered and independent interpretation performed. Decision-making details documented in ED Course.  ECG/medicine tests: ordered and independent interpretation performed. Decision-making details documented in ED Course.    Risk  OTC drugs.  Prescription drug management.  Decision regarding hospitalization.            Scribe Attestation:   Scribe #1: I  performed the above scribed service and the documentation accurately describes the services I performed. I attest to the accuracy of the note.        ED Course as of 11/03/23 2200 Fri Nov 03, 2023   1347 EKG 12-lead  EKG independently interpreted by me shows normal sinus rhythm, rate 88, no STEMI, appears baseline [BD]   1348 POCT Glucose(!): 354  Elevated [BD]   1504 CBC W/ AUTO DIFFERENTIAL(!)  CBC unremarkable without leukocytosis, significant anemia, or decreased platelets   [BD]   1504 Lipase: 19  Inconsistent with pancreatitis [BD]   1504 Troponin I(!): 0.027  Appears at baseline, will repeat after three hours. No active chest pain and low suspicion for ACS at this time [BD]   1504 Comp. Metabolic Panel(!)  CMP shows mild KYA with creatinine 1.5, up from 1.2 baseline.  No significant electrolyte derangement.  Patient receiving fluids [BD]   1543 X-Ray Chest AP Portable  Chest x-ray independently interpreted by me shows no acute process such as pneumonia, pneumothorax, or pulmonary edema.    [BD]   1743 Urinalysis, Reflex to Urine Culture Urine, Clean Catch(!)  UA consistent with UTI.  We will initiate treatment with Rocephin [BD]   1743 POCT Glucose(!): 140  Repeat glucose much improved at 1:40 a.m. following the L of fluids [BD]   1743 Troponin I(!): 0.035  Troponin is slightly up trending on repeat. No active substernal chest pain. Will continue to monitor    I discussed the case with Hospital Medicine who will place the patient observation to their service to trend troponins given the up trending troponin currently. Pt agrees with the plan.   [BD]      ED Course User Index  [BD] Emmett Quintanilla MD                    I, Emmett Quintanilla, personally performed the services described in this documentation. All medical record entries made by the scribe were at my direction and in my presence. I have reviewed the chart and agree that the record reflects my personal performance and is accurate and  complete.    Clinical Impression:   Final diagnoses:  [R06.02] Shortness of breath  [B02.9] Herpes zoster without complication (Primary)  [R73.9] Hyperglycemia  [F41.9] Anxiety  [R79.89] Elevated troponin  [R07.9] Chest pain, unspecified type        ED Disposition Condition    Observation Stable                Emmett Quintanilla MD  11/03/23 8716

## 2023-11-03 NOTE — ED TRIAGE NOTES
Pt presents to ED via POV with c/o nerve pain to LEFT side from shingles. States is not currently taking anything for pain. Pt also reports N/V and abd pain that started 2-3 days ago. Reports being exposed to brother in law who had similar symptoms. Denies fever. No current tx. Pt hyperglycemic upon arrival.

## 2023-11-03 NOTE — Clinical Note
Diagnosis: Elevated troponin [519004]   Future Attending Provider: COURTNEY HOLM [[id number]]   Admitting Provider:: CATHERINE CHOW [[id number]]

## 2023-11-03 NOTE — FIRST PROVIDER EVALUATION
"Medical screening examination initiated.  I have conducted a focused provider triage encounter, findings are as follows:    Brief history of present illness:  Vomiting, Shortness of Breath, and Pain 2/2 shingles.     Vitals:    11/03/23 1250   BP: 135/60   Pulse: 91   Resp: 18   Temp: 97.8 °F (36.6 °C)   TempSrc: Oral   SpO2: 100%   Weight: 79.4 kg (175 lb)   Height: 5' 9" (1.753 m)       Pertinent physical exam:  AAO, no distress    Brief workup plan:  ED workup for SOB/Vomiting. Anti-emetics.     Preliminary workup initiated; this workup will be continued and followed by the physician or advanced practice provider that is assigned to the patient when roomed.  "

## 2023-11-04 LAB
ESTIMATED AVG GLUCOSE: 209 MG/DL (ref 68–131)
HBA1C MFR BLD: 8.9 % (ref 4–5.6)
POCT GLUCOSE: 174 MG/DL (ref 70–110)
POCT GLUCOSE: 179 MG/DL (ref 70–110)
POCT GLUCOSE: 193 MG/DL (ref 70–110)
POCT GLUCOSE: 296 MG/DL (ref 70–110)
TROPONIN I SERPL DL<=0.01 NG/ML-MCNC: 0.04 NG/ML (ref 0–0.03)
TROPONIN I SERPL DL<=0.01 NG/ML-MCNC: 0.04 NG/ML (ref 0–0.03)

## 2023-11-04 PROCEDURE — 25000003 PHARM REV CODE 250: Mod: HCNC | Performed by: HOSPITALIST

## 2023-11-04 PROCEDURE — 84484 ASSAY OF TROPONIN QUANT: CPT | Mod: HCNC | Performed by: HOSPITALIST

## 2023-11-04 PROCEDURE — 63600175 PHARM REV CODE 636 W HCPCS: Mod: HCNC | Performed by: HOSPITALIST

## 2023-11-04 PROCEDURE — 25000003 PHARM REV CODE 250: Mod: HCNC | Performed by: FAMILY MEDICINE

## 2023-11-04 PROCEDURE — 25000003 PHARM REV CODE 250: Mod: HCNC | Performed by: STUDENT IN AN ORGANIZED HEALTH CARE EDUCATION/TRAINING PROGRAM

## 2023-11-04 PROCEDURE — 96366 THER/PROPH/DIAG IV INF ADDON: CPT

## 2023-11-04 PROCEDURE — 36415 COLL VENOUS BLD VENIPUNCTURE: CPT | Mod: HCNC | Performed by: FAMILY MEDICINE

## 2023-11-04 PROCEDURE — 96372 THER/PROPH/DIAG INJ SC/IM: CPT | Performed by: HOSPITALIST

## 2023-11-04 PROCEDURE — 83036 HEMOGLOBIN GLYCOSYLATED A1C: CPT | Mod: HCNC | Performed by: FAMILY MEDICINE

## 2023-11-04 PROCEDURE — 36415 COLL VENOUS BLD VENIPUNCTURE: CPT | Mod: HCNC | Performed by: HOSPITALIST

## 2023-11-04 PROCEDURE — 96361 HYDRATE IV INFUSION ADD-ON: CPT

## 2023-11-04 PROCEDURE — 84484 ASSAY OF TROPONIN QUANT: CPT | Mod: 91,HCNC | Performed by: HOSPITALIST

## 2023-11-04 PROCEDURE — G0378 HOSPITAL OBSERVATION PER HR: HCPCS | Mod: HCNC

## 2023-11-04 RX ORDER — SODIUM CHLORIDE 9 MG/ML
INJECTION, SOLUTION INTRAVENOUS CONTINUOUS
Status: DISCONTINUED | OUTPATIENT
Start: 2023-11-04 | End: 2023-11-05

## 2023-11-04 RX ORDER — OXYCODONE HYDROCHLORIDE 5 MG/1
5 TABLET ORAL EVERY 6 HOURS PRN
Status: DISCONTINUED | OUTPATIENT
Start: 2023-11-04 | End: 2023-11-04

## 2023-11-04 RX ORDER — ACETAMINOPHEN 500 MG
1000 TABLET ORAL EVERY 6 HOURS PRN
Status: DISCONTINUED | OUTPATIENT
Start: 2023-11-04 | End: 2023-11-06 | Stop reason: HOSPADM

## 2023-11-04 RX ADMIN — TICAGRELOR 90 MG: 90 TABLET ORAL at 10:11

## 2023-11-04 RX ADMIN — SODIUM CHLORIDE: 9 INJECTION, SOLUTION INTRAVENOUS at 01:11

## 2023-11-04 RX ADMIN — LIDOCAINE 1 PATCH: 700 PATCH TOPICAL at 12:11

## 2023-11-04 RX ADMIN — ALUMINUM HYDROXIDE, MAGNESIUM HYDROXIDE, AND DIMETHICONE 30 ML: 200; 20; 200 SUSPENSION ORAL at 10:11

## 2023-11-04 RX ADMIN — GABAPENTIN 300 MG: 300 CAPSULE ORAL at 10:11

## 2023-11-04 RX ADMIN — POLYETHYLENE GLYCOL 3350 17 G: 17 POWDER, FOR SOLUTION ORAL at 10:11

## 2023-11-04 RX ADMIN — ASPIRIN 81 MG CHEWABLE TABLET 81 MG: 81 TABLET CHEWABLE at 10:11

## 2023-11-04 RX ADMIN — GABAPENTIN 300 MG: 300 CAPSULE ORAL at 03:11

## 2023-11-04 RX ADMIN — GABAPENTIN 300 MG: 300 CAPSULE ORAL at 09:11

## 2023-11-04 RX ADMIN — ACETAMINOPHEN 1000 MG: 500 TABLET, FILM COATED ORAL at 03:11

## 2023-11-04 RX ADMIN — ATORVASTATIN CALCIUM 80 MG: 40 TABLET, FILM COATED ORAL at 09:11

## 2023-11-04 RX ADMIN — ISOSORBIDE MONONITRATE 30 MG: 30 TABLET, EXTENDED RELEASE ORAL at 10:11

## 2023-11-04 RX ADMIN — FLUOXETINE 40 MG: 20 CAPSULE ORAL at 10:11

## 2023-11-04 RX ADMIN — HYDRALAZINE HYDROCHLORIDE 50 MG: 25 TABLET, FILM COATED ORAL at 09:11

## 2023-11-04 RX ADMIN — INSULIN ASPART 1 UNITS: 100 INJECTION, SOLUTION INTRAVENOUS; SUBCUTANEOUS at 09:11

## 2023-11-04 RX ADMIN — INSULIN ASPART 6 UNITS: 100 INJECTION, SOLUTION INTRAVENOUS; SUBCUTANEOUS at 05:11

## 2023-11-04 RX ADMIN — METOPROLOL SUCCINATE 50 MG: 50 TABLET, EXTENDED RELEASE ORAL at 10:11

## 2023-11-04 RX ADMIN — CEFTRIAXONE 1 G: 1 INJECTION, POWDER, FOR SOLUTION INTRAMUSCULAR; INTRAVENOUS at 05:11

## 2023-11-04 RX ADMIN — TICAGRELOR 90 MG: 90 TABLET ORAL at 09:11

## 2023-11-04 RX ADMIN — HYDRALAZINE HYDROCHLORIDE 50 MG: 25 TABLET, FILM COATED ORAL at 10:11

## 2023-11-04 RX ADMIN — Medication 6 MG: at 10:11

## 2023-11-04 RX ADMIN — ENOXAPARIN SODIUM 40 MG: 40 INJECTION SUBCUTANEOUS at 05:11

## 2023-11-04 NOTE — PLAN OF CARE
Plan of care is ongoing.    Problem: Adult Inpatient Plan of Care  Goal: Plan of Care Review  Outcome: Ongoing, Progressing  Goal: Patient-Specific Goal (Individualized)  Outcome: Ongoing, Progressing  Goal: Absence of Hospital-Acquired Illness or Injury  Outcome: Ongoing, Progressing  Goal: Optimal Comfort and Wellbeing  Outcome: Ongoing, Progressing  Goal: Readiness for Transition of Care  Outcome: Ongoing, Progressing     Problem: Infection  Goal: Absence of Infection Signs and Symptoms  Outcome: Ongoing, Progressing     Problem: Diabetes Comorbidity  Goal: Blood Glucose Level Within Targeted Range  Outcome: Ongoing, Progressing

## 2023-11-04 NOTE — ASSESSMENT & PLAN NOTE
Creatine stable for now. BMP reviewed- noted Estimated Creatinine Clearance: 34.9 mL/min (A) (based on SCr of 1.5 mg/dL (H)). according to latest data. Based on current GFR, CKD stage is stage 3 - GFR 30-59.  Monitor UOP and serial BMP and adjust therapy as needed. Renally dose meds. Avoid nephrotoxic medications and procedures.

## 2023-11-04 NOTE — ED NOTES
Report received from Day shift RN, AVRIL Amador. Introduced self to the patient. Patient reports pain under her left breast and left back. Vitals stable. Pt states her anti hypertensive is due for 22:00. No further complaints at this time. MD made aware of pain    As per , collection of trop I for 2030 can be avoided as it  was collected at 18:30 and resulted at 1910 as per MD order

## 2023-11-04 NOTE — H&P
"[facility] Medicine  History & Physical    Patient Name: Lorena Contreras  MRN: [id number]  Patient Class: OP- Observation  Admission Date: 11/3/2023  Attending Physician: Rios Jerry MD   Primary Care Provider: Jorge Paris MD         Patient information was obtained from patient, past medical records and ER records.     Subjective:     Principal Problem:Elevated troponin    Chief Complaint:   Chief Complaint   Patient presents with    Emesis     Pt to ER with reports of N/V with loss of appetite since yesterday. PT reports amarilys SOB. Pt also reports current diagnosis of shingles under left breast wrapping around towards back. Pt rates pain 10/10        HPI: Ms. Contreras is a pleasant 73 yr old female with a hx of CAD, MI x 2 s/p left heart catheterization x 3, HTN, HLD, GERD, anemia, anxiety, depression, arthritis, stroke, CKD, and fibromyalgia and a past surgical hx of bilateral knee surgery and R elbow surgery who presented to the ED for N/V, SOB, and 10/10 constant "sharp, lightening bolt, stinging, heavy" left sided pain that starts under her breast and wraps around her rib cage to her back. She was seen at [place] ED for painful rash on 10/14/23, was found to have shingles, and was prescribed tylenol and valacyclovir. The pain she is experiencing today is in the same dermatomal pattern as the shingles rash she had previously. She also endorses having chills earlier today. She states the Gabapentin given to her today helped with her pain as well as the Abx that was started and says that Ibuprofen helped at home, but knows that because of her CKD she shouldn't take it. Moving and touching makes the pain worse. She lives with her grandson and his fiance, denies tobacco, alcohol, as well as recreational drugs. She denies fever, hearing/vision changes, cough, CP, diaphoresis, palpitations, diarrhea, urinary frequency, urgency, and dizziness.     In the ED, workup included CBC, " CMP, troponin, UA, urine culture, POCT glucose, CXR. Workup revealed BUN 32, creatinine 1.5, GFR of 37, glucose of 354, ALP of 175, Troponin uptrend from 0.027 to 0.035 to 0.041. UA showed hazy, 2+ protein, 2+ glucose, 3+ occult blood, positive nitrites, 2+ leukocytes, >100 RBCs, 34 WBC, many bacteria. UA reflexed to urine culture. Patient given Rocephin, gabapentin, zofran, and tramadol with some improvement in her pain. Patient will be placed under observation to trend troponin and treat her UTI.      Past Medical History:   Diagnosis Date    Allergy     Altered mental status 06/19/2022    DYSARTHRIA, SPASTIC MOVEMENTS & DIFFICULTY SWALLOWING    Anemia     Anxiety     Arthritis     Cataract     both removed    Colon polyps     Coronary artery disease     Depression     Diabetes mellitus, type II     Disorder of kidney and ureter     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/2023    Fibromyalgia     Follicular lymphoma     GERD (gastroesophageal reflux disease)     HTN (hypertension)     Hyperlipidemia     MI (myocardial infarction) 03/2019    Personal history of colonic polyps     Restless leg syndrome     Stroke        Past Surgical History:   Procedure Laterality Date    COLONOSCOPY  11/07/2012    Colon polyp found; repeat in 5 years    ELBOW SURGERY Right 2015    dislocation repair     ESOPHAGOGASTRODUODENOSCOPY  11/07/2012    atrophic gastritis, H pylori testing negative    INCISION AND DRAINAGE FOOT Right 6/2/2021    Procedure: INCISION AND DRAINAGE, FOOT, bone biopsy;  Surgeon: Quiana Penn DPM;  Location: [facility] OR;  Service: Podiatry;  Laterality: Right;    KNEE SURGERY Bilateral 2015    scoped    LEFT HEART CATHETERIZATION Left 3/29/2019    Procedure: Left heart cath;  Surgeon: Bladimir Barbosa MD;  Location: [facility] CATH LAB;  Service: Cardiology;  Laterality: Left;    LEFT HEART CATHETERIZATION Left 11/18/2019    Procedure: Left heart cath;  Surgeon: Karl Rico MD;  Location: [facility] CATH LAB;  Service:  Cardiology;  Laterality: Left;    LEFT HEART CATHETERIZATION Left 1/8/2020    Procedure: Left heart cath, right radial, noon start;  Surgeon: Christos Monreal MD;  Location: [facility] CATH LAB;  Service: Cardiology;  Laterality: Left;  RN Pre Op 1-6-20.  To be admitted 1-7-20 sor Aspirin Disensitation    TONSILLECTOMY  1955    ULTRASOUND GUIDANCE  1/8/2020    Procedure: Ultrasound Guidance;  Surgeon: Christos Monreal MD;  Location: [facility] CATH LAB;  Service: Cardiology;;       Review of patient's allergies indicates:   Allergen Reactions    Novolin 70/30 (semi-synthetic) Nausea And Vomiting     Severe vomiting on Relion 70/30    Sulfa (sulfonamide antibiotics) Anaphylaxis    Talwin [pentazocine lactate] Anaphylaxis    Victoza [liraglutide] Nausea And Vomiting    Glipizide Nausea Only    Citric acid     Codeine     Influenza virus vaccines Hives    Iodine and iodide containing products Hives    Levetiracetam Itching    Rituxan [rituximab] Hives    Zoloft [sertraline] Nausea And Vomiting       No current facility-administered medications on file prior to encounter.     Current Outpatient Medications on File Prior to Encounter   Medication Sig    ASCORBIC ACID, VITAMIN C, ORAL Take by mouth once daily.    aspirin 81 MG Chew Take 1 tablet (81 mg total) by mouth once daily.    atenoloL (TENORMIN) 50 MG tablet Take 1 tablet (50 mg total) by mouth 2 (two) times daily.    atorvastatin (LIPITOR) 80 MG tablet Take 1 tablet (80 mg total) by mouth every evening.    Bacillus coagulans (DIGESTIVE ADVANTAGE IMMUNE ORAL) Take by mouth.    blood-glucose meter kit To check BG 4 times daily, to use with insurance preferred meter    blood-glucose sensor (DEXCOM G6 SENSOR) Samina 1 sensor, change every 10 days    blood-glucose transmitter (DEXCOM G6 TRANSMITTER) Samina 1 Transmitter, use as directed    cholecalciferol, vitamin D3, (VITAMIN D3) 50 mcg (2,000 unit) Cap Take 2 capsules by mouth 2 (two) times daily.    cyanocobalamin (VITAMIN B-12) 1000  "MCG tablet Take 100 mcg by mouth once daily.    diclofenac sodium (VOLTAREN TOP) Apply topically.    dulaglutide (TRULICITY) 3 mg/0.5 mL pen injector Inject 3 mg into the skin every 7 days.    EPINEPHrine (EPIPEN) 0.3 mg/0.3 mL AtIn Inject 0.3 mLs (0.3 mg total) into the muscle once. for 1 dose    ESOMEPRAZOLE MAGNESIUM ORAL Take by mouth as needed.    FLUoxetine 40 MG capsule Take 1 capsule (40 mg total) by mouth once daily.    hydrALAZINE (APRESOLINE) 50 MG tablet Take 1 tablet (50 mg total) by mouth 2 (two) times a day.    insulin detemir U-100, Levemir, (LEVEMIR FLEXPEN) 100 unit/mL (3 mL) InPn pen Inject 15 Units into the skin every evening.    isosorbide mononitrate (IMDUR) 30 MG 24 hr tablet Take 1 tablet (30 mg total) by mouth once daily.    lancets 32 gauge Misc 1 lancet by Misc.(Non-Drug; Combo Route) route 4 (four) times daily.    lancets Misc To check BG 4 times daily, to use with insurance preferred meter    magnesium oxide (MAG-OX) 400 mg tablet Take 400 mg by mouth once daily.    melatonin 10 mg Cap Take 10 mg by mouth nightly.    metoprolol succinate (TOPROL-XL) 50 MG 24 hr tablet Take 1 tablet by mouth once daily    multivitamin/iron/folic acid (CENTRUM COMPLETE ORAL) Take by mouth.    NOVOLOG FLEXPEN U-100 INSULIN 100 unit/mL (3 mL) InPn pen Inject 20 Units into the skin 3 (three) times daily with meals.    pantoprazole (PROTONIX) 40 MG tablet Take 1 tablet (40 mg total) by mouth once daily.    pen needle, diabetic (BD ULTRA-FINE SAGAR PEN NEEDLE) 32 gauge x 5/32" Ndle USE WITH NOVOLOG MIX FLEXPENS with 4 injection daily, ICD10: 11.65    ticagrelor (BRILINTA) 90 mg tablet Take 1 tablet (90 mg total) by mouth 2 (two) times daily.    TRUE METRIX GLUCOSE TEST STRIP Strp SMARTSIG:Strip(s) Via Meter 4 Times Daily    TRUEPLUS LANCETS 30 gauge Misc     valACYclovir (VALTREX) 1000 MG tablet Take 1 tablet (1,000 mg total) by mouth 3 (three) times daily. for 7 days    vitamin E 1000 UNIT capsule Take 1,000 " Units by mouth once daily.     Family History       Problem Relation (Age of Onset)    Allergy (severe) Daughter    Cancer Mother, Father    Diabetes Sister    Heart disease Mother    Hypertension Sister    Lung cancer Brother    No Known Problems Daughter          Tobacco Use    Smoking status: Never    Smokeless tobacco: Never   Substance and Sexual Activity    Alcohol use: Not Currently    Drug use: Never    Sexual activity: Not Currently     Partners: Male     Review of Systems   Constitutional:  Positive for chills. Negative for fever.   Eyes:  Negative for visual disturbance.   Respiratory:  Positive for shortness of breath. Negative for cough.    Cardiovascular:  Negative for chest pain and palpitations.   Gastrointestinal:  Positive for nausea and vomiting. Negative for diarrhea.   Genitourinary:  Positive for dysuria. Negative for frequency and urgency.   Neurological:  Negative for dizziness and syncope.     Objective:     Vital Signs (Most Recent):  Temp: 97.8 °F (36.6 °C) (11/03/23 1250)  Pulse: 78 (11/03/23 1926)  Resp: 14 (11/03/23 1520)  BP: (!) 157/72 (11/03/23 1926)  SpO2: 100 % (11/03/23 1926) Vital Signs (24h Range):  Temp:  [97.8 °F (36.6 °C)] 97.8 °F (36.6 °C)  Pulse:  [78-91] 78  Resp:  [13-18] 14  SpO2:  [99 %-100 %] 100 %  BP: (119-174)/(56-81) 157/72     Weight: 79.4 kg (175 lb)  Body mass index is 25.84 kg/m².     Physical Exam  Constitutional:       General: She is awake. She is not in acute distress.     Appearance: She is not ill-appearing.   Cardiovascular:      Rate and Rhythm: Normal rate and regular rhythm.      Heart sounds: Murmur heard.      No friction rub. No gallop.   Pulmonary:      Effort: Pulmonary effort is normal. No respiratory distress.      Breath sounds: Normal breath sounds. No wheezing, rhonchi or rales.   Abdominal:      General: Abdomen is flat. Bowel sounds are normal. There is no distension.      Palpations: Abdomen is soft.   Musculoskeletal:      Right lower  leg: No edema.      Left lower leg: No edema.   Skin:     General: Skin is warm and dry.      Findings: Rash present.      Comments: Faint rash that looks to be healing that follows dermatomal pattern that begins under her left breast and wraps around her rib cage to mid back and stops at spine. Doesn't cross over midline.   Neurological:      Mental Status: She is alert.   Psychiatric:         Behavior: Behavior is cooperative.                Significant Labs: All pertinent labs within the past 24 hours have been reviewed.  CBC:   Recent Labs   Lab 11/03/23  1338   WBC 8.32   HGB 12.2   HCT 37.2        CMP:   Recent Labs   Lab 11/03/23  1338   *   K 4.0      CO2 19*   *   BUN 32*   CREATININE 1.5*   CALCIUM 9.4   PROT 7.2   ALBUMIN 3.2*   BILITOT 0.4   ALKPHOS 175*   AST 41*   ALT 53*   ANIONGAP 12     Lipase:   Recent Labs   Lab 11/03/23  1338   LIPASE 19     POCT Glucose:   Recent Labs   Lab 11/03/23  1252 11/03/23  1626 11/03/23  1827   POCTGLUCOSE 354* 140* 117*     Troponin:   Recent Labs   Lab 11/03/23  1338 11/03/23  1633 11/03/23  1835   TROPONINI 0.027* 0.035* 0.041*     Urine Studies:   Recent Labs   Lab 11/03/23  1628   COLORU Yellow   APPEARANCEUA Hazy*   PHUR 8.0   SPECGRAV 1.015   PROTEINUA 2+*   GLUCUA 2+*   KETONESU Negative   BILIRUBINUA Negative   OCCULTUA 3+*   NITRITE Positive*   UROBILINOGEN Negative   LEUKOCYTESUR 2+*   RBCUA >100*   WBCUA 34*   BACTERIA Many*   SQUAMEPITHEL 2   HYALINECASTS 0       Significant Imaging:   Imaging Results              X-Ray Chest AP Portable (Final result)  Result time 11/03/23 15:24:51      Final result by Kenneth Caceres MD (11/03/23 15:24:51)                   Impression:      No radiographic evidence of pneumonia or other source of shortness of breath on this single view, noting that early/mild viral pneumonia or nonspecific bronchitis may be radiographically occult.      Electronically signed by: Kenneth Caceres  "MD  Date:    11/03/2023  Time:    15:24               Narrative:    EXAMINATION:  XR CHEST AP PORTABLE    CLINICAL HISTORY:  Shortness of breath    TECHNIQUE:  Single frontal view of the chest was performed.    COMPARISON:  Chest radiograph 04/09/2023, PET CT 03/28/2014    FINDINGS:  Monitoring leads overlie the chest.  Patient is rotated.    Cardiomediastinal silhouette is midline with similar calcification and mild tortuosity of the aorta and upper limits of normal sized heart.  Pulmonary vasculature and hilar contours are within normal limits.  Scattered linear opacities throughout the lungs consistent with mild platelike scarring versus atelectasis.  The lungs are otherwise well expanded without consolidation, pleural effusion or pneumothorax.  No acute osseous process seen.  PA and lateral views can be obtained.                                       Assessment/Plan:     * Elevated troponin  Ms. Contreras is a 73 yr old female with a hx of CAD, MI x 2 s/p left heart catheterization x 3, HTN, HLD, GERD, anemia, anxiety, depression, arthritis, stroke, CKD, and fibromyalgia who presented to the ED for N/V, SOB, and 10/10 constant "sharp, lightening bolt, stinging, heavy" left sided pain that starts under her breast and wraps around her rib cage to her back. Troponin was ordered in ED, found to be slightly elevated. Incidental finding, suspect pain 2/2 postherpetic neuralgia.     - Trend troponin  - Tele monitoring   - NPO at midnight  - Monitor for any acute changes      Post herpetic neuralgia  - gabapentin 300 mg 3 times daily      Acute cystitis  Pt found to have a UTI upon arrival to ED.    - Rocephin 1 g IV daily  - Urine culture pending      Chronic diastolic heart failure  No evidence of acute decompensation or fluid overload, continue home regimen, I/O's, daily weights.    Moderate episode of recurrent major depressive disorder  Patient has recurrent depression which is moderate and is currently " controlled. Will Continue anti-depressant medications. We will not consult psychiatry at this time. Patient does not display psychosis at this time. Continue to monitor closely and adjust plan of care as needed.    Stage 3a chronic kidney disease  Creatine stable for now. BMP reviewed- noted Estimated Creatinine Clearance: 34.9 mL/min (A) (based on SCr of 1.5 mg/dL (H)). according to latest data. Based on current GFR, CKD stage is stage 3 - GFR 30-59.  Monitor UOP and serial BMP and adjust therapy as needed. Renally dose meds. Avoid nephrotoxic medications and procedures.    Type 2 diabetes mellitus with stage 3 chronic kidney disease, with long-term current use of insulin  Last HgbA1c   Lab Results   Component Value Date    HGBA1C 13.9 (H) 04/10/2023     Hold oral antihyperglycemics while inpatient  PRN sliding scale insulin  ACHS glucose monitoring   ADA diet     Hyperlipidemia  Continue statin    Hypertension associated with diabetes  Poorly controlled, continue home medications and monitor blood pressure, adjust as needed.       VTE Risk Mitigation (From admission, onward)           Ordered     enoxaparin injection 40 mg  Daily         11/03/23 2012     IP VTE HIGH RISK PATIENT  Once         11/03/23 2012     Place sequential compression device  Until discontinued         11/03/23 2012                         On 11/03/2023, patient should be placed in hospital observation services under my care in collaboration with Rios Jerry MD.          Jenifer Osman PA-C  Department of Hospital Medicine  [facility] - Emergency Dept    N/A  Family History   Problem Relation Age of Onset    Cancer Mother         colon    Heart disease Mother     Cancer Father         lung    Lung cancer Brother     Diabetes Sister     Hypertension Sister     Allergy (severe) Daughter     No Known Problems Daughter     Stroke Neg Hx     Hyperlipidemia Neg Hx      Pertinent information:

## 2023-11-04 NOTE — PHARMACY MED REC
"Admission Medication History     The home medication history was taken by Rand Manning.    You may go to "Admission" then "Reconcile Home Medications" tabs to review and/or act upon these items.     The home medication list has been updated by the Pharmacy department.   Please read ALL comments highlighted in yellow.   Please address this information as you see fit.    Feel free to contact us if you have any questions or require assistance.        Medications listed below were obtained from: Patient/family and Analytic software- [employer]  (Not in a hospital admission)          Rand Manning  [phone]                 .          "

## 2023-11-04 NOTE — ASSESSMENT & PLAN NOTE
"Ms. Contreras is a 73 yr old female with a hx of CAD, MI x 2 s/p left heart catheterization x 3, HTN, HLD, GERD, anemia, anxiety, depression, arthritis, stroke, CKD, and fibromyalgia who presented to the ED for N/V, SOB, and 10/10 constant "sharp, lightening bolt, stinging, heavy" left sided pain that starts under her breast and wraps around her rib cage to her back. Troponin was ordered in ED, found to be slightly elevated. Incidental finding, suspect pain 2/2 postherpetic neuralgia.     - Trend troponin  - Tele monitoring   - NPO at midnight  - Monitor for any acute changes    "

## 2023-11-04 NOTE — NURSING
[facility]  Nurses Note -- 4 Eyes      11/3/2023       Skin assessed on: Q Shift      [x] No Pressure Injuries Present    []Prevention Measures Documented    [] Yes LDA  for Pressure Injury Previously documented     [] Yes New Pressure Injury Discovered   [] LDA for New Pressure Injury Added      Attending RN:  Kiki Helms RN     Second RN:  DIONNA Long

## 2023-11-04 NOTE — HPI
"Ms. Contreras is a pleasant 73 yr old female with a hx of CAD, MI x 2 s/p left heart catheterization x 3, HTN, HLD, GERD, anemia, anxiety, depression, arthritis, stroke, CKD, and fibromyalgia and a past surgical hx of bilateral knee surgery and R elbow surgery who presented to the ED for N/V, SOB, and 10/10 constant "sharp, lightening bolt, stinging, heavy" left sided pain that starts under her breast and wraps around her rib cage to her back. She was seen at [place] ED for painful rash on 10/14/23, was found to have shingles, and was prescribed tylenol and valacyclovir. The pain she is experiencing today is in the same dermatomal pattern as the shingles rash she had previously. She also endorses having chills earlier today. She states the Gabapentin given to her today helped with her pain as well as the Abx that was started and says that Ibuprofen helped at home, but knows that because of her CKD she shouldn't take it. Moving and touching makes the pain worse. She lives with her grandson and his fiance, denies tobacco, alcohol, as well as recreational drugs. She denies fever, hearing/vision changes, cough, CP, diaphoresis, palpitations, diarrhea, urinary frequency, urgency, and dizziness.     In the ED, workup included CBC, CMP, troponin, UA, urine culture, POCT glucose, CXR. Workup revealed BUN 32, creatinine 1.5, GFR of 37, glucose of 354, ALP of 175, Troponin uptrend from 0.027 to 0.035 to 0.041. UA showed hazy, 2+ protein, 2+ glucose, 3+ occult blood, positive nitrites, 2+ leukocytes, >100 RBCs, 34 WBC, many bacteria. UA reflexed to urine culture. Patient given Rocephin, gabapentin, zofran, and tramadol with some improvement in her pain. Patient will be placed under observation to trend troponin and treat her UTI.  "

## 2023-11-04 NOTE — PLAN OF CARE
Case Management Assessment     PCP: Thang Zee MD  Pharmacy: [facility]  Patient Arrived From: Home  Existing Help at Home: Gt-grandson; Milton-sister  Barriers to Discharge: None    Discharge Plan:    A. Home with family; follow-up   B. TBD    Independent at home with grandson who assists if needed; sister also assists; no current medical services or equipment; no needs anticipated        11/04/23 1127   Discharge Assessment   Assessment Type Discharge Planning Assessment   Confirmed/corrected address, phone number and insurance Yes   Confirmed Demographics Correct on Facesheet   Source of Information patient;health record   Does patient/caregiver understand observation status Yes   Communicated MIKHAIL with patient/caregiver Date not available/Unable to determine   Reason For Admission SOB   People in Home grandchild(daniel)   Facility Arrived From: Home   Do you expect to return to your current living situation? Yes   Do you have help at home or someone to help you manage your care at home? Yes   Who are your caregiver(s) and their phone number(s)? Shan-grandson: [phone]; Milton-sister   Prior to hospitilization cognitive status: Alert/Oriented   Current cognitive status: Alert/Oriented   Do you have any problems with: Errands/Grocery;Needs other help   Specify other help Sister assists as needed and transports; grandson assists at home if needed   Home Accessibility wheelchair accessible   Home Layout Able to live on 1st floor   Equipment Currently Used at Home none   Readmission within 30 days? No   Patient currently being followed by outpatient case management? No   Do you currently have service(s) that help you manage your care at home? No   Do you take prescription medications? Yes   Do you have prescription coverage? Yes   Coverage [facility]   Do you have any problems affording any of your prescribed medications? No   Is the patient taking medications as prescribed? yes   Who is going to  help you get home at discharge? Shan-grandson; Milton-sister   How do you get to doctors appointments? family or friend will provide   Are you on dialysis? No   Do you take coumadin? No   DME Needed Upon Discharge  other (see comments)  (TBD)   Discharge Plan discussed with: Patient   Transition of Care Barriers None   Discharge Plan A Home with family  (Follow-ups)   Discharge Plan B Other  (TBD)     SW Role explained to patient; two patient identifiers recognized; SW contact information placed on Communication board. Discussed patient managing health care at home and discussed discharge plans A and B; determined who would be helping patient at home with recovery: Shan, grandson and Milton-sister will help with recovery at home.

## 2023-11-04 NOTE — ASSESSMENT & PLAN NOTE
Last HgbA1c   Lab Results   Component Value Date    HGBA1C 13.9 (H) 04/10/2023     Hold oral antihyperglycemics while inpatient  PRN sliding scale insulin  ACHS glucose monitoring   ADA diet

## 2023-11-04 NOTE — SUBJECTIVE & OBJECTIVE
Interval History:   Shortness of breath has resolved, chest pain remains on left side with mild radiation to back.Troponin trend flat. Chest pain appears to be musculoskeletal, localized to left side underneath left breast and follows dermatomal pattern to back, tender to palpation and worse with inspiration. Possible etiologies include acute worsening of kidney function along with possible worsening of chronic diastolic function - will d/w patient regarding obtaining echo  Nursing concerned for possible tremors, during my exam no overt focal deficit noted, alert/oriented x3. Mild tremors of bl hands with movement which patient states is new - will cont to monitor     Review of Systems   Constitutional:  Negative for fatigue and fever.   HENT: Negative.     Respiratory: Negative.     Cardiovascular:  Positive for chest pain. Negative for leg swelling.   Gastrointestinal: Negative.    Genitourinary: Negative.    Musculoskeletal:  Positive for back pain.   Skin: Negative.    Neurological:  Positive for tremors.   Psychiatric/Behavioral: Negative.       Objective:     Vital Signs (Most Recent):  Temp: 97.8 °F (36.6 °C) (11/04/23 0720)  Pulse: 74 (11/04/23 0720)  Resp: 16 (11/04/23 0720)  BP: (!) 140/63 (11/04/23 0720)  SpO2: 98 % (11/04/23 0720) Vital Signs (24h Range):  Temp:  [97.5 °F (36.4 °C)-97.9 °F (36.6 °C)] 97.8 °F (36.6 °C)  Pulse:  [69-91] 74  Resp:  [13-18] 16  SpO2:  [96 %-100 %] 98 %  BP: (109-183)/(52-84) 140/63     Weight: 81 kg (178 lb 9.2 oz)  Body mass index is 26.37 kg/m².    Intake/Output Summary (Last 24 hours) at 11/4/2023 1108  Last data filed at 11/4/2023 0830  Gross per 24 hour   Intake 0 ml   Output 100 ml   Net -100 ml         Physical Exam  Vitals reviewed.   Constitutional:       General: She is not in acute distress.  HENT:      Head: Normocephalic and atraumatic.      Nose: Nose normal.      Mouth/Throat:      Mouth: Mucous membranes are moist.   Eyes:      Extraocular Movements:  Extraocular movements intact.      Conjunctiva/sclera: Conjunctivae normal.   Cardiovascular:      Rate and Rhythm: Normal rate and regular rhythm.   Pulmonary:      Effort: No respiratory distress.      Breath sounds: No wheezing or rhonchi.   Abdominal:      General: There is no distension.      Palpations: Abdomen is soft.      Tenderness: There is no abdominal tenderness. There is no guarding.   Musculoskeletal:         General: No deformity.      Cervical back: Neck supple. No tenderness.   Skin:     General: Skin is warm and dry.   Neurological:      General: No focal deficit present.      Mental Status: She is alert and oriented to person, place, and time.      Comments: Mild intentional tremor of b/l hands             Significant Labs: All pertinent labs within the past 24 hours have been reviewed.    Significant Imaging: I have reviewed all pertinent imaging results/findings within the past 24 hours.

## 2023-11-04 NOTE — SUBJECTIVE & OBJECTIVE
Past Medical History:   Diagnosis Date    Allergy     Altered mental status 06/19/2022    DYSARTHRIA, SPASTIC MOVEMENTS & DIFFICULTY SWALLOWING    Anemia     Anxiety     Arthritis     Cataract     both removed    Colon polyps     Coronary artery disease     Depression     Diabetes mellitus, type II     Disorder of kidney and ureter     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/2023    Fibromyalgia     Follicular lymphoma     GERD (gastroesophageal reflux disease)     HTN (hypertension)     Hyperlipidemia     MI (myocardial infarction) 03/2019    Personal history of colonic polyps     Restless leg syndrome     Stroke        Past Surgical History:   Procedure Laterality Date    COLONOSCOPY  11/07/2012    Colon polyp found; repeat in 5 years    ELBOW SURGERY Right 2015    dislocation repair     ESOPHAGOGASTRODUODENOSCOPY  11/07/2012    atrophic gastritis, H pylori testing negative    INCISION AND DRAINAGE FOOT Right 6/2/2021    Procedure: INCISION AND DRAINAGE, FOOT, bone biopsy;  Surgeon: Quiana Penn DPM;  Location: [facility] OR;  Service: Podiatry;  Laterality: Right;    KNEE SURGERY Bilateral 2015    scoped    LEFT HEART CATHETERIZATION Left 3/29/2019    Procedure: Left heart cath;  Surgeon: Bladimir Barbosa MD;  Location: [facility] CATH LAB;  Service: Cardiology;  Laterality: Left;    LEFT HEART CATHETERIZATION Left 11/18/2019    Procedure: Left heart cath;  Surgeon: Karl Rico MD;  Location: [facility] CATH LAB;  Service: Cardiology;  Laterality: Left;    LEFT HEART CATHETERIZATION Left 1/8/2020    Procedure: Left heart cath, right radial, noon start;  Surgeon: Christos Monreal MD;  Location: [facility] CATH LAB;  Service: Cardiology;  Laterality: Left;  RN Pre Op 1-6-20.  To be admitted 1-7-20 sor Aspirin Disensitation    TONSILLECTOMY  1955    ULTRASOUND GUIDANCE  1/8/2020    Procedure: Ultrasound Guidance;  Surgeon: Christos Monreal MD;  Location: [facility] CATH LAB;  Service: Cardiology;;       Review of patient's allergies indicates:    Allergen Reactions    Novolin 70/30 (semi-synthetic) Nausea And Vomiting     Severe vomiting on Relion 70/30    Sulfa (sulfonamide antibiotics) Anaphylaxis    Talwin [pentazocine lactate] Anaphylaxis    Victoza [liraglutide] Nausea And Vomiting    Glipizide Nausea Only    Citric acid     Codeine     Influenza virus vaccines Hives    Iodine and iodide containing products Hives    Levetiracetam Itching    Rituxan [rituximab] Hives    Zoloft [sertraline] Nausea And Vomiting       No current facility-administered medications on file prior to encounter.     Current Outpatient Medications on File Prior to Encounter   Medication Sig    ASCORBIC ACID, VITAMIN C, ORAL Take by mouth once daily.    aspirin 81 MG Chew Take 1 tablet (81 mg total) by mouth once daily.    atenoloL (TENORMIN) 50 MG tablet Take 1 tablet (50 mg total) by mouth 2 (two) times daily.    atorvastatin (LIPITOR) 80 MG tablet Take 1 tablet (80 mg total) by mouth every evening.    Bacillus coagulans (DIGESTIVE ADVANTAGE IMMUNE ORAL) Take by mouth.    blood-glucose meter kit To check BG 4 times daily, to use with insurance preferred meter    blood-glucose sensor (DEXCOM G6 SENSOR) Samina 1 sensor, change every 10 days    blood-glucose transmitter (DEXCOM G6 TRANSMITTER) Samina 1 Transmitter, use as directed    cholecalciferol, vitamin D3, (VITAMIN D3) 50 mcg (2,000 unit) Cap Take 2 capsules by mouth 2 (two) times daily.    cyanocobalamin (VITAMIN B-12) 1000 MCG tablet Take 100 mcg by mouth once daily.    diclofenac sodium (VOLTAREN TOP) Apply topically.    dulaglutide (TRULICITY) 3 mg/0.5 mL pen injector Inject 3 mg into the skin every 7 days.    EPINEPHrine (EPIPEN) 0.3 mg/0.3 mL AtIn Inject 0.3 mLs (0.3 mg total) into the muscle once. for 1 dose    ESOMEPRAZOLE MAGNESIUM ORAL Take by mouth as needed.    FLUoxetine 40 MG capsule Take 1 capsule (40 mg total) by mouth once daily.    hydrALAZINE (APRESOLINE) 50 MG tablet Take 1 tablet (50 mg total) by mouth 2  "(two) times a day.    insulin detemir U-100, Levemir, (LEVEMIR FLEXPEN) 100 unit/mL (3 mL) InPn pen Inject 15 Units into the skin every evening.    isosorbide mononitrate (IMDUR) 30 MG 24 hr tablet Take 1 tablet (30 mg total) by mouth once daily.    lancets 32 gauge Misc 1 lancet by Misc.(Non-Drug; Combo Route) route 4 (four) times daily.    lancets Misc To check BG 4 times daily, to use with insurance preferred meter    magnesium oxide (MAG-OX) 400 mg tablet Take 400 mg by mouth once daily.    melatonin 10 mg Cap Take 10 mg by mouth nightly.    metoprolol succinate (TOPROL-XL) 50 MG 24 hr tablet Take 1 tablet by mouth once daily    multivitamin/iron/folic acid (CENTRUM COMPLETE ORAL) Take by mouth.    NOVOLOG FLEXPEN U-100 INSULIN 100 unit/mL (3 mL) InPn pen Inject 20 Units into the skin 3 (three) times daily with meals.    pantoprazole (PROTONIX) 40 MG tablet Take 1 tablet (40 mg total) by mouth once daily.    pen needle, diabetic (BD ULTRA-FINE SAGAR PEN NEEDLE) 32 gauge x 5/32" Ndle USE WITH NOVOLOG MIX FLEXPENS with 4 injection daily, ICD10: 11.65    ticagrelor (BRILINTA) 90 mg tablet Take 1 tablet (90 mg total) by mouth 2 (two) times daily.    TRUE METRIX GLUCOSE TEST STRIP Strp SMARTSIG:Strip(s) Via Meter 4 Times Daily    TRUEPLUS LANCETS 30 gauge Misc     valACYclovir (VALTREX) 1000 MG tablet Take 1 tablet (1,000 mg total) by mouth 3 (three) times daily. for 7 days    vitamin E 1000 UNIT capsule Take 1,000 Units by mouth once daily.     Family History       Problem Relation (Age of Onset)    Allergy (severe) Daughter    Cancer Mother, Father    Diabetes Sister    Heart disease Mother    Hypertension Sister    Lung cancer Brother    No Known Problems Daughter          Tobacco Use    Smoking status: Never    Smokeless tobacco: Never   Substance and Sexual Activity    Alcohol use: Not Currently    Drug use: Never    Sexual activity: Not Currently     Partners: Male     Review of Systems   Constitutional:  " Positive for chills. Negative for fever.   Eyes:  Negative for visual disturbance.   Respiratory:  Positive for shortness of breath. Negative for cough.    Cardiovascular:  Negative for chest pain and palpitations.   Gastrointestinal:  Positive for nausea and vomiting. Negative for diarrhea.   Genitourinary:  Positive for dysuria. Negative for frequency and urgency.   Neurological:  Negative for dizziness and syncope.     Objective:     Vital Signs (Most Recent):  Temp: 97.8 °F (36.6 °C) (11/03/23 1250)  Pulse: 78 (11/03/23 1926)  Resp: 14 (11/03/23 1520)  BP: (!) 157/72 (11/03/23 1926)  SpO2: 100 % (11/03/23 1926) Vital Signs (24h Range):  Temp:  [97.8 °F (36.6 °C)] 97.8 °F (36.6 °C)  Pulse:  [78-91] 78  Resp:  [13-18] 14  SpO2:  [99 %-100 %] 100 %  BP: (119-174)/(56-81) 157/72     Weight: 79.4 kg (175 lb)  Body mass index is 25.84 kg/m².     Physical Exam  Constitutional:       General: She is awake. She is not in acute distress.     Appearance: She is not ill-appearing.   Cardiovascular:      Rate and Rhythm: Normal rate and regular rhythm.      Heart sounds: Murmur heard.      No friction rub. No gallop.   Pulmonary:      Effort: Pulmonary effort is normal. No respiratory distress.      Breath sounds: Normal breath sounds. No wheezing, rhonchi or rales.   Abdominal:      General: Abdomen is flat. Bowel sounds are normal. There is no distension.      Palpations: Abdomen is soft.   Musculoskeletal:      Right lower leg: No edema.      Left lower leg: No edema.   Skin:     General: Skin is warm and dry.      Findings: Rash present.      Comments: Faint rash that looks to be healing that follows dermatomal pattern that begins under her left breast and wraps around her rib cage to mid back and stops at spine. Doesn't cross over midline.   Neurological:      Mental Status: She is alert.   Psychiatric:         Behavior: Behavior is cooperative.                Significant Labs: All pertinent labs within the past 24  hours have been reviewed.  CBC:   Recent Labs   Lab 11/03/23  1338   WBC 8.32   HGB 12.2   HCT 37.2        CMP:   Recent Labs   Lab 11/03/23  1338   *   K 4.0      CO2 19*   *   BUN 32*   CREATININE 1.5*   CALCIUM 9.4   PROT 7.2   ALBUMIN 3.2*   BILITOT 0.4   ALKPHOS 175*   AST 41*   ALT 53*   ANIONGAP 12     Lipase:   Recent Labs   Lab 11/03/23  1338   LIPASE 19     POCT Glucose:   Recent Labs   Lab 11/03/23  1252 11/03/23  1626 11/03/23  1827   POCTGLUCOSE 354* 140* 117*     Troponin:   Recent Labs   Lab 11/03/23  1338 11/03/23  1633 11/03/23  1835   TROPONINI 0.027* 0.035* 0.041*     Urine Studies:   Recent Labs   Lab 11/03/23  1628   COLORU Yellow   APPEARANCEUA Hazy*   PHUR 8.0   SPECGRAV 1.015   PROTEINUA 2+*   GLUCUA 2+*   KETONESU Negative   BILIRUBINUA Negative   OCCULTUA 3+*   NITRITE Positive*   UROBILINOGEN Negative   LEUKOCYTESUR 2+*   RBCUA >100*   WBCUA 34*   BACTERIA Many*   SQUAMEPITHEL 2   HYALINECASTS 0       Significant Imaging:   Imaging Results              X-Ray Chest AP Portable (Final result)  Result time 11/03/23 15:24:51      Final result by Kenneth Caceres MD (11/03/23 15:24:51)                   Impression:      No radiographic evidence of pneumonia or other source of shortness of breath on this single view, noting that early/mild viral pneumonia or nonspecific bronchitis may be radiographically occult.      Electronically signed by: Kenneth Caceres MD  Date:    11/03/2023  Time:    15:24               Narrative:    EXAMINATION:  XR CHEST AP PORTABLE    CLINICAL HISTORY:  Shortness of breath    TECHNIQUE:  Single frontal view of the chest was performed.    COMPARISON:  Chest radiograph 04/09/2023, PET CT 03/28/2014    FINDINGS:  Monitoring leads overlie the chest.  Patient is rotated.    Cardiomediastinal silhouette is midline with similar calcification and mild tortuosity of the aorta and upper limits of normal sized heart.  Pulmonary vasculature and hilar  contours are within normal limits.  Scattered linear opacities throughout the lungs consistent with mild platelike scarring versus atelectasis.  The lungs are otherwise well expanded without consolidation, pleural effusion or pneumothorax.  No acute osseous process seen.  PA and lateral views can be obtained.

## 2023-11-04 NOTE — NURSING
Admitted pt from ER, AAOx4, Vital signs stable, No complaints of Nausea and Vomiting, chest pain nor , rash below left breast and left upper back. On room air. Bed in low position, wheels locked. Side rails up. Call light within reach.

## 2023-11-04 NOTE — ASSESSMENT & PLAN NOTE
Most recent echo reviewed showing grade III diastolic dysfunction  No evidence of acute decompensation or fluid overload, continue home regimen, I/O's, daily weights.

## 2023-11-04 NOTE — ASSESSMENT & PLAN NOTE
Last HgbA1c   Lab Results   Component Value Date    HGBA1C 13.9 (H) 04/10/2023     Hold oral antihyperglycemics while inpatient  PRN sliding scale insulin  ACHS glucose monitoring   ADA diet   Will repeat a1c during this hospitalization

## 2023-11-04 NOTE — ASSESSMENT & PLAN NOTE
"Ms. Contreras is a 73 yr old female with a hx of CAD, MI x 2 s/p left heart catheterization x 3, HTN, HLD, GERD, anemia, anxiety, depression, arthritis, stroke, CKD, and fibromyalgia who presented to the ED for N/V, SOB, and 10/10 constant "sharp, lightening bolt, stinging, heavy" left sided pain that starts under her breast and wraps around her rib cage to her back. Troponin was ordered in ED, found to be slightly elevated. Incidental finding, suspect pain 2/2 postherpetic neuralgia.     - Trend troponin - troponin trend essentially remains flat with musculoskeletal chest pain   - Tele monitoring   - Monitor for any acute changes    "

## 2023-11-04 NOTE — ASSESSMENT & PLAN NOTE
No evidence of acute decompensation or fluid overload, continue home regimen, I/O's, daily weights.

## 2023-11-04 NOTE — PROGRESS NOTES
"[facility] Medicine  Progress Note    Patient Name: Lorena Contreras  MRN: [id number]  Patient Class: OP- Observation   Admission Date: 11/3/2023  Length of Stay: 0 days  Attending Physician: Wilbur Montoya MD  Primary Care Provider: Jogre Paris MD        Subjective:     Principal Problem:Elevated troponin        HPI:  Ms. Contreras is a pleasant 73 yr old female with a hx of CAD, MI x 2 s/p left heart catheterization x 3, HTN, HLD, GERD, anemia, anxiety, depression, arthritis, stroke, CKD, and fibromyalgia and a past surgical hx of bilateral knee surgery and R elbow surgery who presented to the ED for N/V, SOB, and 10/10 constant "sharp, lightening bolt, stinging, heavy" left sided pain that starts under her breast and wraps around her rib cage to her back. She was seen at [place] ED for painful rash on 10/14/23, was found to have shingles, and was prescribed tylenol and valacyclovir. The pain she is experiencing today is in the same dermatomal pattern as the shingles rash she had previously. She also endorses having chills earlier today. She states the Gabapentin given to her today helped with her pain as well as the Abx that was started and says that Ibuprofen helped at home, but knows that because of her CKD she shouldn't take it. Moving and touching makes the pain worse. She lives with her grandson and his fiance, denies tobacco, alcohol, as well as recreational drugs. She denies fever, hearing/vision changes, cough, CP, diaphoresis, palpitations, diarrhea, urinary frequency, urgency, and dizziness.     In the ED, workup included CBC, CMP, troponin, UA, urine culture, POCT glucose, CXR. Workup revealed BUN 32, creatinine 1.5, GFR of 37, glucose of 354, ALP of 175, Troponin uptrend from 0.027 to 0.035 to 0.041. UA showed hazy, 2+ protein, 2+ glucose, 3+ occult blood, positive nitrites, 2+ leukocytes, >100 RBCs, 34 WBC, many bacteria. UA reflexed to urine culture. Patient given " Rocephin, gabapentin, zofran, and tramadol with some improvement in her pain. Patient will be placed under observation to trend troponin and treat her UTI.      Overview/Hospital Course:  No notes on file    Interval History:   Shortness of breath has resolved, chest pain remains on left side with mild radiation to back.Troponin trend flat. Chest pain appears to be musculoskeletal, localized to left side underneath left breast and follows dermatomal pattern to back, tender to palpation and worse with inspiration. Possible etiologies include acute worsening of kidney function along with possible worsening of chronic diastolic function - will d/w patient regarding obtaining echo  Nursing concerned for possible tremors, during my exam no overt focal deficit noted, alert/oriented x3. Mild tremors of bl hands with movement which patient states is new - will cont to monitor     Review of Systems   Constitutional:  Negative for fatigue and fever.   HENT: Negative.     Respiratory: Negative.     Cardiovascular:  Positive for chest pain. Negative for leg swelling.   Gastrointestinal: Negative.    Genitourinary: Negative.    Musculoskeletal:  Positive for back pain.   Skin: Negative.    Neurological:  Positive for tremors.   Psychiatric/Behavioral: Negative.       Objective:     Vital Signs (Most Recent):  Temp: 97.8 °F (36.6 °C) (11/04/23 0720)  Pulse: 74 (11/04/23 0720)  Resp: 16 (11/04/23 0720)  BP: (!) 140/63 (11/04/23 0720)  SpO2: 98 % (11/04/23 0720) Vital Signs (24h Range):  Temp:  [97.5 °F (36.4 °C)-97.9 °F (36.6 °C)] 97.8 °F (36.6 °C)  Pulse:  [69-91] 74  Resp:  [13-18] 16  SpO2:  [96 %-100 %] 98 %  BP: (109-183)/(52-84) 140/63     Weight: 81 kg (178 lb 9.2 oz)  Body mass index is 26.37 kg/m².    Intake/Output Summary (Last 24 hours) at 11/4/2023 1108  Last data filed at 11/4/2023 0830  Gross per 24 hour   Intake 0 ml   Output 100 ml   Net -100 ml         Physical Exam  Vitals reviewed.   Constitutional:        "General: She is not in acute distress.  HENT:      Head: Normocephalic and atraumatic.      Nose: Nose normal.      Mouth/Throat:      Mouth: Mucous membranes are moist.   Eyes:      Extraocular Movements: Extraocular movements intact.      Conjunctiva/sclera: Conjunctivae normal.   Cardiovascular:      Rate and Rhythm: Normal rate and regular rhythm.   Pulmonary:      Effort: No respiratory distress.      Breath sounds: No wheezing or rhonchi.   Abdominal:      General: There is no distension.      Palpations: Abdomen is soft.      Tenderness: There is no abdominal tenderness. There is no guarding.   Musculoskeletal:         General: No deformity.      Cervical back: Neck supple. No tenderness.   Skin:     General: Skin is warm and dry.   Neurological:      General: No focal deficit present.      Mental Status: She is alert and oriented to person, place, and time.      Comments: Mild intentional tremor of b/l hands             Significant Labs: All pertinent labs within the past 24 hours have been reviewed.    Significant Imaging: I have reviewed all pertinent imaging results/findings within the past 24 hours.      Assessment/Plan:      * Elevated troponin  Ms. Contreras is a 73 yr old female with a hx of CAD, MI x 2 s/p left heart catheterization x 3, HTN, HLD, GERD, anemia, anxiety, depression, arthritis, stroke, CKD, and fibromyalgia who presented to the ED for N/V, SOB, and 10/10 constant "sharp, lightening bolt, stinging, heavy" left sided pain that starts under her breast and wraps around her rib cage to her back. Troponin was ordered in ED, found to be slightly elevated. Incidental finding, suspect pain 2/2 postherpetic neuralgia.     - Trend troponin - troponin trend essentially remains flat with musculoskeletal chest pain   - Tele monitoring   - Monitor for any acute changes      Post herpetic neuralgia  - gabapentin 300 mg 3 times daily      Acute cystitis  Pt found to have a UTI upon arrival to " ED.    - Rocephin 1 g IV daily  - Urine culture pending      Chronic diastolic heart failure  Most recent echo reviewed showing grade III diastolic dysfunction  No evidence of acute decompensation or fluid overload, continue home regimen, I/O's, daily weights.    Moderate episode of recurrent major depressive disorder  Patient has recurrent depression which is moderate and is currently controlled. Will Continue anti-depressant medications. We will not consult psychiatry at this time. Patient does not display psychosis at this time. Continue to monitor closely and adjust plan of care as needed.    Stage 3a chronic kidney disease  Creatine stable for now. BMP reviewed- noted Estimated Creatinine Clearance: 34.9 mL/min (A) (based on SCr of 1.5 mg/dL (H)). according to latest data. Based on current GFR, CKD stage is stage 3 - GFR 30-59.  Monitor UOP and serial BMP and adjust therapy as needed. Renally dose meds. Avoid nephrotoxic medications and procedures.    Type 2 diabetes mellitus with stage 3 chronic kidney disease, with long-term current use of insulin  Last HgbA1c   Lab Results   Component Value Date    HGBA1C 13.9 (H) 04/10/2023     Hold oral antihyperglycemics while inpatient  PRN sliding scale insulin  ACHS glucose monitoring   ADA diet   Will repeat a1c during this hospitalization    Hyperlipidemia  Continue statin    Hypertension associated with diabetes  Poorly controlled, continue home medications and monitor blood pressure, adjust as needed.       VTE Risk Mitigation (From admission, onward)         Ordered     enoxaparin injection 40 mg  Daily         11/03/23 2012     IP VTE HIGH RISK PATIENT  Once         11/03/23 2012     Place sequential compression device  Until discontinued         11/03/23 2012                Discharge Planning   MIKHAIL:      Code Status: Full Code   Is the patient medically ready for discharge?:     Reason for patient still in hospital (select all that apply): Treatment                      Wilbur Montoya MD  Department of Hospital Medicine   [facility]

## 2023-11-05 LAB
ANION GAP SERPL CALC-SCNC: 8 MMOL/L (ref 8–16)
BACTERIA UR CULT: ABNORMAL
BUN SERPL-MCNC: 29 MG/DL (ref 8–23)
CALCIUM SERPL-MCNC: 8.3 MG/DL (ref 8.7–10.5)
CHLORIDE SERPL-SCNC: 107 MMOL/L (ref 95–110)
CO2 SERPL-SCNC: 20 MMOL/L (ref 23–29)
CREAT SERPL-MCNC: 1.4 MG/DL (ref 0.5–1.4)
EST. GFR  (NO RACE VARIABLE): 40 ML/MIN/1.73 M^2
GLUCOSE SERPL-MCNC: 286 MG/DL (ref 70–110)
POCT GLUCOSE: 184 MG/DL (ref 70–110)
POCT GLUCOSE: 223 MG/DL (ref 70–110)
POCT GLUCOSE: 248 MG/DL (ref 70–110)
POCT GLUCOSE: 328 MG/DL (ref 70–110)
POTASSIUM SERPL-SCNC: 4.3 MMOL/L (ref 3.5–5.1)
SODIUM SERPL-SCNC: 135 MMOL/L (ref 136–145)

## 2023-11-05 PROCEDURE — 96365 THER/PROPH/DIAG IV INF INIT: CPT | Mod: 59

## 2023-11-05 PROCEDURE — 63600175 PHARM REV CODE 636 W HCPCS: Mod: HCNC | Performed by: HOSPITALIST

## 2023-11-05 PROCEDURE — 36415 COLL VENOUS BLD VENIPUNCTURE: CPT | Mod: HCNC | Performed by: FAMILY MEDICINE

## 2023-11-05 PROCEDURE — 99213 PR OFFICE/OUTPT VISIT, EST, LEVL III, 20-29 MIN: ICD-10-PCS | Mod: HCNC,25,, | Performed by: INTERNAL MEDICINE

## 2023-11-05 PROCEDURE — G0378 HOSPITAL OBSERVATION PER HR: HCPCS | Mod: HCNC

## 2023-11-05 PROCEDURE — 96375 TX/PRO/DX INJ NEW DRUG ADDON: CPT | Mod: 59

## 2023-11-05 PROCEDURE — 63600175 PHARM REV CODE 636 W HCPCS: Mod: HCNC | Performed by: FAMILY MEDICINE

## 2023-11-05 PROCEDURE — 25000003 PHARM REV CODE 250: Mod: HCNC | Performed by: FAMILY MEDICINE

## 2023-11-05 PROCEDURE — 96376 TX/PRO/DX INJ SAME DRUG ADON: CPT | Mod: 59

## 2023-11-05 PROCEDURE — 25000003 PHARM REV CODE 250: Mod: HCNC | Performed by: HOSPITALIST

## 2023-11-05 PROCEDURE — 80048 BASIC METABOLIC PNL TOTAL CA: CPT | Mod: HCNC | Performed by: FAMILY MEDICINE

## 2023-11-05 PROCEDURE — 96361 HYDRATE IV INFUSION ADD-ON: CPT

## 2023-11-05 PROCEDURE — 99213 OFFICE O/P EST LOW 20 MIN: CPT | Mod: HCNC,25,, | Performed by: INTERNAL MEDICINE

## 2023-11-05 PROCEDURE — 25000003 PHARM REV CODE 250: Mod: HCNC | Performed by: STUDENT IN AN ORGANIZED HEALTH CARE EDUCATION/TRAINING PROGRAM

## 2023-11-05 PROCEDURE — 96372 THER/PROPH/DIAG INJ SC/IM: CPT | Performed by: HOSPITALIST

## 2023-11-05 RX ORDER — HYDRALAZINE HYDROCHLORIDE 25 MG/1
75 TABLET, FILM COATED ORAL 2 TIMES DAILY
Status: DISCONTINUED | OUTPATIENT
Start: 2023-11-06 | End: 2023-11-06

## 2023-11-05 RX ORDER — NIFEDIPINE 30 MG/1
30 TABLET, EXTENDED RELEASE ORAL DAILY
Status: DISCONTINUED | OUTPATIENT
Start: 2023-11-05 | End: 2023-11-05

## 2023-11-05 RX ORDER — LISINOPRIL 5 MG/1
10 TABLET ORAL DAILY
Status: DISCONTINUED | OUTPATIENT
Start: 2023-11-06 | End: 2023-11-06 | Stop reason: HOSPADM

## 2023-11-05 RX ORDER — HYDRALAZINE HYDROCHLORIDE 20 MG/ML
10 INJECTION INTRAMUSCULAR; INTRAVENOUS EVERY 6 HOURS PRN
Status: DISCONTINUED | OUTPATIENT
Start: 2023-11-05 | End: 2023-11-06 | Stop reason: HOSPADM

## 2023-11-05 RX ORDER — OXYCODONE HYDROCHLORIDE 5 MG/1
5 TABLET ORAL EVERY 6 HOURS PRN
Status: DISCONTINUED | OUTPATIENT
Start: 2023-11-05 | End: 2023-11-06 | Stop reason: HOSPADM

## 2023-11-05 RX ORDER — DIPHENHYDRAMINE HCL 25 MG
50 CAPSULE ORAL ONCE
Status: COMPLETED | OUTPATIENT
Start: 2023-11-05 | End: 2023-11-05

## 2023-11-05 RX ADMIN — CEFTRIAXONE 1 G: 1 INJECTION, POWDER, FOR SOLUTION INTRAMUSCULAR; INTRAVENOUS at 05:11

## 2023-11-05 RX ADMIN — GABAPENTIN 300 MG: 300 CAPSULE ORAL at 10:11

## 2023-11-05 RX ADMIN — INSULIN ASPART 8 UNITS: 100 INJECTION, SOLUTION INTRAVENOUS; SUBCUTANEOUS at 09:11

## 2023-11-05 RX ADMIN — SODIUM CHLORIDE: 9 INJECTION, SOLUTION INTRAVENOUS at 05:11

## 2023-11-05 RX ADMIN — ACETAMINOPHEN 1000 MG: 500 TABLET, FILM COATED ORAL at 05:11

## 2023-11-05 RX ADMIN — HYDRALAZINE HYDROCHLORIDE 10 MG: 20 INJECTION INTRAMUSCULAR; INTRAVENOUS at 08:11

## 2023-11-05 RX ADMIN — NIFEDIPINE 30 MG: 30 TABLET, FILM COATED, EXTENDED RELEASE ORAL at 01:11

## 2023-11-05 RX ADMIN — INSULIN ASPART 4 UNITS: 100 INJECTION, SOLUTION INTRAVENOUS; SUBCUTANEOUS at 12:11

## 2023-11-05 RX ADMIN — GABAPENTIN 300 MG: 300 CAPSULE ORAL at 03:11

## 2023-11-05 RX ADMIN — ISOSORBIDE MONONITRATE 30 MG: 30 TABLET, EXTENDED RELEASE ORAL at 10:11

## 2023-11-05 RX ADMIN — ENOXAPARIN SODIUM 40 MG: 40 INJECTION SUBCUTANEOUS at 05:11

## 2023-11-05 RX ADMIN — DIPHENHYDRAMINE HYDROCHLORIDE 50 MG: 25 CAPSULE ORAL at 03:11

## 2023-11-05 RX ADMIN — TICAGRELOR 90 MG: 90 TABLET ORAL at 08:11

## 2023-11-05 RX ADMIN — ATORVASTATIN CALCIUM 80 MG: 40 TABLET, FILM COATED ORAL at 08:11

## 2023-11-05 RX ADMIN — TICAGRELOR 90 MG: 90 TABLET ORAL at 10:11

## 2023-11-05 RX ADMIN — OXYCODONE HYDROCHLORIDE 5 MG: 5 TABLET ORAL at 08:11

## 2023-11-05 RX ADMIN — HYDRALAZINE HYDROCHLORIDE 50 MG: 25 TABLET, FILM COATED ORAL at 08:11

## 2023-11-05 RX ADMIN — FLUOXETINE 40 MG: 20 CAPSULE ORAL at 10:11

## 2023-11-05 RX ADMIN — ASPIRIN 81 MG CHEWABLE TABLET 81 MG: 81 TABLET CHEWABLE at 10:11

## 2023-11-05 RX ADMIN — LIDOCAINE 1 PATCH: 700 PATCH TOPICAL at 01:11

## 2023-11-05 RX ADMIN — METOPROLOL SUCCINATE 50 MG: 50 TABLET, EXTENDED RELEASE ORAL at 10:11

## 2023-11-05 RX ADMIN — GABAPENTIN 300 MG: 300 CAPSULE ORAL at 08:11

## 2023-11-05 RX ADMIN — HYDRALAZINE HYDROCHLORIDE 50 MG: 25 TABLET, FILM COATED ORAL at 10:11

## 2023-11-05 RX ADMIN — Medication 6 MG: at 08:11

## 2023-11-05 NOTE — ASSESSMENT & PLAN NOTE
Poorly controlled, continue home medications and monitor blood pressure, adjust as needed.   Continue home regimen and now started on acei in setting of htn, diabetes and cad

## 2023-11-05 NOTE — ASSESSMENT & PLAN NOTE
- gabapentin 300 mg 3 times daily  Opioid medication added for adjunctive therapy as she remains with significant localized pain

## 2023-11-05 NOTE — NURSING
Pt /77. Pt c/o left back pain 10/10. Last tylenol given at 0532 next dose cannot be given until 1131. No prn bp meds. Notified MD.

## 2023-11-05 NOTE — SUBJECTIVE & OBJECTIVE
Interval History: Cardiology consulted today to rule out cardiac etiology although this is unlikely as pain appears to be msk related. Pain has significantly improved with oxycodone (listed as allergy however patient tolerated it just fine)  A1c reviewed - now 8.9 (down from 13.9). Continue home med regimen as she is improving and stress importance of med compliance     Elevated bp and initially started on procardia however patient was on phone with her daughter and felt as if her throat was closing. This was ~2 hrs after administration of medication. I reexamined patient and provided reassurance that she is no in anaphylactic shock. Neuro exam performed as well and no overt focal deficit appreciated. Initially planning to d/c home however will treat with benadryl and change antihypertensive to lisinopril.   Anticipate d/c home in next 24-48hrs pending clinical course     Review of Systems  Objective:     Vital Signs (Most Recent):  Temp: 97.6 °F (36.4 °C) (11/05/23 1141)  Pulse: 76 (11/05/23 1203)  Resp: 18 (11/05/23 1141)  BP: (!) 143/65 (11/05/23 1203)  SpO2: 99 % (11/05/23 1141) Vital Signs (24h Range):  Temp:  [97.6 °F (36.4 °C)-97.8 °F (36.6 °C)] 97.6 °F (36.4 °C)  Pulse:  [76-87] 76  Resp:  [16-18] 18  SpO2:  [97 %-99 %] 99 %  BP: (109-182)/(56-77) 143/65     Weight: 81 kg (178 lb 9.2 oz)  Body mass index is 26.37 kg/m².    Intake/Output Summary (Last 24 hours) at 11/5/2023 1519  Last data filed at 11/5/2023 1407  Gross per 24 hour   Intake 720 ml   Output 1180 ml   Net -460 ml         Physical Exam  Vitals reviewed.   Constitutional:       General: She is not in acute distress.  HENT:      Head: Normocephalic and atraumatic.      Nose: Nose normal.      Mouth/Throat:      Mouth: Mucous membranes are moist.   Eyes:      Extraocular Movements: Extraocular movements intact.      Conjunctiva/sclera: Conjunctivae normal.   Cardiovascular:      Rate and Rhythm: Normal rate.   Pulmonary:      Effort: No respiratory  distress.      Breath sounds: No wheezing or rhonchi.   Chest:      Chest wall: No tenderness.   Abdominal:      General: There is no distension.      Palpations: Abdomen is soft.      Tenderness: There is no abdominal tenderness. There is no guarding.   Musculoskeletal:         General: No deformity.      Cervical back: Neck supple. No tenderness.   Skin:     General: Skin is warm and dry.   Neurological:      General: No focal deficit present.      Mental Status: She is alert and oriented to person, place, and time.   Psychiatric:         Mood and Affect: Mood normal.             Significant Labs: All pertinent labs within the past 24 hours have been reviewed.    Significant Imaging: I have reviewed all pertinent imaging results/findings within the past 24 hours.

## 2023-11-05 NOTE — NURSING
Pt resting comfortably, IV infusing fluids as ordered.  Pt able to ambulate to the restroom and void without difficulty, reports diarrhea.  No distress noted,    [facility] [place]  Nurses Note -- 4 Eyes      11/4/2023       Skin assessed on: Q Shift      [x] No Pressure Injuries Present    []Prevention Measures Documented    [] Yes LDA  for Pressure Injury Previously documented     [] Yes New Pressure Injury Discovered   [] LDA for New Pressure Injury Added      Attending RN:  Marita Smith RN     Second RN:  DIONNA Long

## 2023-11-05 NOTE — ASSESSMENT & PLAN NOTE
"Ms. Contreras is a 73 yr old female with a hx of CAD, MI x 2 s/p left heart catheterization x 3, HTN, HLD, GERD, anemia, anxiety, depression, arthritis, stroke, CKD, and fibromyalgia who presented to the ED for N/V, SOB, and 10/10 constant "sharp, lightening bolt, stinging, heavy" left sided pain that starts under her breast and wraps around her rib cage to her back. Troponin was ordered in ED, found to be slightly elevated. Incidental finding, suspect pain 2/2 postherpetic neuralgia.     - Trend troponin - troponin trend essentially remains flat with musculoskeletal chest pain, unlikely cardiac etiology  - Tele monitoring   Cardiology consult - appreciate recommendations     "

## 2023-11-05 NOTE — NURSING
[facility], [facility]  Nurses Note -- 4 Eyes      11/5/2023       Skin assessed on: Q Shift      [x] No Pressure Injuries Present    [x]Prevention Measures Documented    [] Yes LDA  for Pressure Injury Previously documented     [] Yes New Pressure Injury Discovered   [] LDA for New Pressure Injury Added      Attending RN:  Tamara Laboy RN     Second RN:  Marita Smith RN

## 2023-11-05 NOTE — ASSESSMENT & PLAN NOTE
Creatine stable for now. BMP reviewed- noted Estimated Creatinine Clearance: 40.7 mL/min (based on SCr of 1.4 mg/dL). according to latest data. Based on current GFR, CKD stage is stage 3 - GFR 30-59.  Monitor UOP and serial BMP and adjust therapy as needed. Renally dose meds. Avoid nephrotoxic medications and procedures.

## 2023-11-05 NOTE — HOSPITAL COURSE
74 y/o female patient admitted due to left sided chest pain which is likely secondary to combination msk and herpes zoster infection for which was completed antiviral treatment. Mild elevation of troponin in ED and trend remained essentially flat. Cardiology consult given hx of cad.  She remained with elevated bp in hospital and was started on procardia however she felt as if her throat was after taking it. Patient was stable with no sign of anaphylactic reaction. Remained with left sided chest pain underneath her breast, mildly erythematous rash which is tender to touch, treated with pain medication with improvement. Her BP improved. She was cleared by cardiology. She was deemed to have reached maximal benefit of hospitalization.

## 2023-11-05 NOTE — ASSESSMENT & PLAN NOTE
Last HgbA1c   Lab Results   Component Value Date    HGBA1C 8.9 (H) 11/04/2023     Hold oral antihyperglycemics while inpatient  PRN sliding scale insulin  ACHS glucose monitoring   ADA diet   Will repeat a1c during this hospitalization - 8.9 (drom from 13.9 in 04/23) - continue home regimen at discharge and continue sliding scale during hospitalization. Stress importance of medication compliance

## 2023-11-05 NOTE — PROGRESS NOTES
"[facility] Medicine  Progress Note    Patient Name: Lorena Contreras  MRN: [id number]  Patient Class: OP- Observation   Admission Date: 11/3/2023  Length of Stay: 0 days  Attending Physician: Wilbur Montoya MD  Primary Care Provider: Thang Zee MD        Subjective:     Principal Problem:Elevated troponin        HPI:  Ms. Contreras is a pleasant 73 yr old female with a hx of CAD, MI x 2 s/p left heart catheterization x 3, HTN, HLD, GERD, anemia, anxiety, depression, arthritis, stroke, CKD, and fibromyalgia and a past surgical hx of bilateral knee surgery and R elbow surgery who presented to the ED for N/V, SOB, and 10/10 constant "sharp, lightening bolt, stinging, heavy" left sided pain that starts under her breast and wraps around her rib cage to her back. She was seen at [facility] ED for painful rash on 10/14/23, was found to have shingles, and was prescribed tylenol and valacyclovir. The pain she is experiencing today is in the same dermatomal pattern as the shingles rash she had previously. She also endorses having chills earlier today. She states the Gabapentin given to her today helped with her pain as well as the Abx that was started and says that Ibuprofen helped at home, but knows that because of her CKD she shouldn't take it. Moving and touching makes the pain worse. She lives with her grandson and his fiance, denies tobacco, alcohol, as well as recreational drugs. She denies fever, hearing/vision changes, cough, CP, diaphoresis, palpitations, diarrhea, urinary frequency, urgency, and dizziness.     In the ED, workup included CBC, CMP, troponin, UA, urine culture, POCT glucose, CXR. Workup revealed BUN 32, creatinine 1.5, GFR of 37, glucose of 354, ALP of 175, Troponin uptrend from 0.027 to 0.035 to 0.041. UA showed hazy, 2+ protein, 2+ glucose, 3+ occult blood, positive nitrites, 2+ leukocytes, >100 RBCs, 34 WBC, many bacteria. UA reflexed to urine culture. Patient given " Rocephin, gabapentin, zofran, and tramadol with some improvement in her pain. Patient will be placed under observation to trend troponin and treat her UTI.      Overview/Hospital Course:  74 y/o female patient admitted due to left sided chest pain which is likely secondary to combination msk and herpes zoster infection for which was completed antiviral treatment. Mild elevation of troponin in ED and trend remained essentially flat. Cardiology consult given hx of cad and awaiting further recommendations. She remained with elevated bp in hospital and was started on procardia however she felt as if her throat was closing due to procardia. Patient was stable with no sign of anaphylactic reaction. Antihypertensive adjusted to acei. Remains with left sided chest pain underneath her breast, mildly erythematous rash which is tender to touch, treated with pain medication with improvement.       Interval History: Cardiology consulted today to rule out cardiac etiology although this is unlikely as pain appears to be msk related. Pain has significantly improved with oxycodone (listed as allergy however patient tolerated it just fine)  A1c reviewed - now 8.9 (down from 13.9). Continue home med regimen as she is improving and stress importance of med compliance     Elevated bp and initially started on procardia however patient was on phone with her daughter and felt as if her throat was closing. This was ~2 hrs after administration of medication. I reexamined patient and provided reassurance that she is no in anaphylactic shock. Neuro exam performed as well and no overt focal deficit appreciated. Initially planning to d/c home however will treat with benadryl and change antihypertensive to lisinopril.   Anticipate d/c home in next 24-48hrs pending clinical course     Review of Systems  Objective:     Vital Signs (Most Recent):  Temp: 97.6 °F (36.4 °C) (11/05/23 1141)  Pulse: 76 (11/05/23 1203)  Resp: 18 (11/05/23 1141)  BP: (!)  143/65 (11/05/23 1203)  SpO2: 99 % (11/05/23 1141) Vital Signs (24h Range):  Temp:  [97.6 °F (36.4 °C)-97.8 °F (36.6 °C)] 97.6 °F (36.4 °C)  Pulse:  [76-87] 76  Resp:  [16-18] 18  SpO2:  [97 %-99 %] 99 %  BP: (109-182)/(56-77) 143/65     Weight: 81 kg (178 lb 9.2 oz)  Body mass index is 26.37 kg/m².    Intake/Output Summary (Last 24 hours) at 11/5/2023 1519  Last data filed at 11/5/2023 1407  Gross per 24 hour   Intake 720 ml   Output 1180 ml   Net -460 ml         Physical Exam  Vitals reviewed.   Constitutional:       General: She is not in acute distress.  HENT:      Head: Normocephalic and atraumatic.      Nose: Nose normal.      Mouth/Throat:      Mouth: Mucous membranes are moist.   Eyes:      Extraocular Movements: Extraocular movements intact.      Conjunctiva/sclera: Conjunctivae normal.   Cardiovascular:      Rate and Rhythm: Normal rate.   Pulmonary:      Effort: No respiratory distress.      Breath sounds: No wheezing or rhonchi.   Chest:      Chest wall: No tenderness.   Abdominal:      General: There is no distension.      Palpations: Abdomen is soft.      Tenderness: There is no abdominal tenderness. There is no guarding.   Musculoskeletal:         General: No deformity.      Cervical back: Neck supple. No tenderness.   Skin:     General: Skin is warm and dry.   Neurological:      General: No focal deficit present.      Mental Status: She is alert and oriented to person, place, and time.   Psychiatric:         Mood and Affect: Mood normal.             Significant Labs: All pertinent labs within the past 24 hours have been reviewed.    Significant Imaging: I have reviewed all pertinent imaging results/findings within the past 24 hours.      Assessment/Plan:      * Elevated troponin  Ms. Contreras is a 73 yr old female with a hx of CAD, MI x 2 s/p left heart catheterization x 3, HTN, HLD, GERD, anemia, anxiety, depression, arthritis, stroke, CKD, and fibromyalgia who presented to the ED for N/V, SOB,  "and 10/10 constant "sharp, lightening bolt, stinging, heavy" left sided pain that starts under her breast and wraps around her rib cage to her back. Troponin was ordered in ED, found to be slightly elevated. Incidental finding, suspect pain 2/2 postherpetic neuralgia.     - Trend troponin - troponin trend essentially remains flat with musculoskeletal chest pain, unlikely cardiac etiology  - Tele monitoring   Cardiology consult - appreciate recommendations       Post herpetic neuralgia  - gabapentin 300 mg 3 times daily  Opioid medication added for adjunctive therapy as she remains with significant localized pain       Acute cystitis  Pt found to have a UTI upon arrival to ED.    - Rocephin 1 g IV daily  - Urine culture pending      Chronic diastolic heart failure  Most recent echo reviewed showing grade III diastolic dysfunction  No evidence of acute decompensation or fluid overload, continue home regimen, I/O's, daily weights.    Moderate episode of recurrent major depressive disorder  Patient has recurrent depression which is moderate and is currently controlled. Will Continue anti-depressant medications. We will not consult psychiatry at this time. Patient does not display psychosis at this time. Continue to monitor closely and adjust plan of care as needed.    Stage 3a chronic kidney disease  Creatine stable for now. BMP reviewed- noted Estimated Creatinine Clearance: 40.7 mL/min (based on SCr of 1.4 mg/dL). according to latest data. Based on current GFR, CKD stage is stage 3 - GFR 30-59.  Monitor UOP and serial BMP and adjust therapy as needed. Renally dose meds. Avoid nephrotoxic medications and procedures.    Type 2 diabetes mellitus with stage 3 chronic kidney disease, with long-term current use of insulin  Last HgbA1c   Lab Results   Component Value Date    HGBA1C 8.9 (H) 11/04/2023     Hold oral antihyperglycemics while inpatient  PRN sliding scale insulin  ACHS glucose monitoring   ADA diet   Will repeat " a1c during this hospitalization - 8.9 (drom from 13.9 in 04/23) - continue home regimen at discharge and continue sliding scale during hospitalization. Stress importance of medication compliance     Hyperlipidemia  Continue statin    Hypertension associated with diabetes  Poorly controlled, continue home medications and monitor blood pressure, adjust as needed.   Continue home regimen and now started on acei in setting of htn, diabetes and cad     VTE Risk Mitigation (From admission, onward)         Ordered     enoxaparin injection 40 mg  Daily         11/03/23 2012     IP VTE HIGH RISK PATIENT  Once         11/03/23 2012     Place sequential compression device  Until discontinued         11/03/23 2012                Discharge Planning   MIKHAIL:      Code Status: Full Code   Is the patient medically ready for discharge?:     Reason for patient still in hospital (select all that apply): Treatment  Discharge Plan A: Home with family (Follow-ups)                  Wilbur Montoya MD  Department of Hospital Medicine   [facility] [place] [facility]

## 2023-11-05 NOTE — PLAN OF CARE
Problem: Adult Inpatient Plan of Care  Goal: Plan of Care Review  Outcome: Ongoing, Progressing  Goal: Patient-Specific Goal (Individualized)  Outcome: Ongoing, Progressing  Goal: Absence of Hospital-Acquired Illness or Injury  Outcome: Ongoing, Progressing  Goal: Optimal Comfort and Wellbeing  Outcome: Ongoing, Progressing  Goal: Readiness for Transition of Care  Outcome: Ongoing, Progressing     Problem: Infection  Goal: Absence of Infection Signs and Symptoms  Outcome: Ongoing, Progressing  Intervention: Prevent or Manage Infection  Flowsheets (Taken 11/5/2023 1344)  Infection Management: aseptic technique maintained     Problem: Diabetes Comorbidity  Goal: Blood Glucose Level Within Targeted Range  Outcome: Ongoing, Progressing  Intervention: Monitor and Manage Glycemia  Flowsheets (Taken 11/5/2023 1344)  Glycemic Management: blood glucose monitored  Pt A&Ox4, free from falls/injury, and able to make needs known during shift. VSS on room air. ITelemetry monitoring continued on box #[id number], visible and audible on monitor at nurses' station, no acute distress noted. Bed locked and in lowest position. Bedside table and call light in reach. Will cont to monitor.

## 2023-11-06 VITALS
HEART RATE: 79 BPM | BODY MASS INDEX: 26.45 KG/M2 | DIASTOLIC BLOOD PRESSURE: 65 MMHG | TEMPERATURE: 98 F | SYSTOLIC BLOOD PRESSURE: 141 MMHG | RESPIRATION RATE: 20 BRPM | HEIGHT: 69 IN | WEIGHT: 178.56 LBS | OXYGEN SATURATION: 97 %

## 2023-11-06 PROBLEM — I35.0 AORTIC STENOSIS: Status: ACTIVE | Noted: 2023-11-06

## 2023-11-06 LAB
AV INDEX (PROSTH): 0.32
AV MEAN GRADIENT: 18 MMHG
AV PEAK GRADIENT: 27 MMHG
AV VALVE AREA BY VELOCITY RATIO: 0.91 CM²
AV VALVE AREA: 1.02 CM²
AV VELOCITY RATIO: 0.29
BSA FOR ECHO PROCEDURE: 1.99 M2
CV ECHO LV RWT: 0.74 CM
DOP CALC AO PEAK VEL: 2.59 M/S
DOP CALC AO VTI: 57.1 CM
DOP CALC LVOT AREA: 3.1 CM2
DOP CALC LVOT DIAMETER: 2 CM
DOP CALC LVOT PEAK VEL: 0.75 M/S
DOP CALC LVOT STROKE VOLUME: 58.09 CM3
DOP CALCLVOT PEAK VEL VTI: 18.5 CM
E WAVE DECELERATION TIME: 244.2 MSEC
E/A RATIO: 1.01
E/E' RATIO: 25.82 M/S
ECHO LV POSTERIOR WALL: 1.6 CM (ref 0.6–1.1)
FRACTIONAL SHORTENING: 33 % (ref 28–44)
INTERVENTRICULAR SEPTUM: 1.19 CM (ref 0.6–1.1)
IVC DIAMETER: 1.64 CM
IVRT: 95.15 MSEC
LA MAJOR: 6.22 CM
LA MINOR: 5.85 CM
LA WIDTH: 5.2 CM
LEFT ATRIUM SIZE: 4.11 CM
LEFT ATRIUM VOLUME INDEX: 55.6 ML/M2
LEFT ATRIUM VOLUME: 109.53 CM3
LEFT INTERNAL DIMENSION IN SYSTOLE: 2.89 CM (ref 2.1–4)
LEFT VENTRICLE DIASTOLIC VOLUME INDEX: 42.75 ML/M2
LEFT VENTRICLE DIASTOLIC VOLUME: 84.22 ML
LEFT VENTRICLE MASS INDEX: 118 G/M2
LEFT VENTRICLE SYSTOLIC VOLUME INDEX: 16.3 ML/M2
LEFT VENTRICLE SYSTOLIC VOLUME: 32.06 ML
LEFT VENTRICULAR INTERNAL DIMENSION IN DIASTOLE: 4.33 CM (ref 3.5–6)
LEFT VENTRICULAR MASS: 233.36 G
LV LATERAL E/E' RATIO: 23.67 M/S
LV SEPTAL E/E' RATIO: 28.4 M/S
LVOT MG: 1.38 MMHG
LVOT MV: 0.55 CM/S
MV PEAK A VEL: 1.41 M/S
MV PEAK E VEL: 1.42 M/S
MV STENOSIS PRESSURE HALF TIME: 70.82 MS
MV VALVE AREA P 1/2 METHOD: 3.11 CM2
PISA TR MAX VEL: 2.83 M/S
POCT GLUCOSE: 184 MG/DL (ref 70–110)
POCT GLUCOSE: 285 MG/DL (ref 70–110)
PV PEAK GRADIENT: 6 MMHG
PV PEAK VELOCITY: 1.24 M/S
RA MAJOR: 5.59 CM
RA PRESSURE ESTIMATED: 3 MMHG
RA WIDTH: 3.4 CM
RIGHT VENTRICULAR END-DIASTOLIC DIMENSION: 3.46 CM
RV TB RVSP: 6 MMHG
RV TISSUE DOPPLER FREE WALL SYSTOLIC VELOCITY 1 (APICAL 4 CHAMBER VIEW): 15.82 CM/S
SINUS: 2.75 CM
TDI LATERAL: 0.06 M/S
TDI SEPTAL: 0.05 M/S
TDI: 0.06 M/S
TR MAX PG: 32 MMHG
TRICUSPID ANNULAR PLANE SYSTOLIC EXCURSION: 2.01 CM
TV REST PULMONARY ARTERY PRESSURE: 35 MMHG
Z-SCORE OF LEFT VENTRICULAR DIMENSION IN END DIASTOLE: -2.68
Z-SCORE OF LEFT VENTRICULAR DIMENSION IN END SYSTOLE: -1.47

## 2023-11-06 PROCEDURE — 99213 OFFICE O/P EST LOW 20 MIN: CPT | Mod: HCNC,,, | Performed by: INTERNAL MEDICINE

## 2023-11-06 PROCEDURE — 25000003 PHARM REV CODE 250: Mod: HCNC | Performed by: HOSPITALIST

## 2023-11-06 PROCEDURE — 96376 TX/PRO/DX INJ SAME DRUG ADON: CPT

## 2023-11-06 PROCEDURE — G0378 HOSPITAL OBSERVATION PER HR: HCPCS | Mod: HCNC

## 2023-11-06 PROCEDURE — 25000003 PHARM REV CODE 250: Mod: HCNC | Performed by: FAMILY MEDICINE

## 2023-11-06 PROCEDURE — 99213 PR OFFICE/OUTPT VISIT, EST, LEVL III, 20-29 MIN: ICD-10-PCS | Mod: HCNC,,, | Performed by: INTERNAL MEDICINE

## 2023-11-06 PROCEDURE — 63600175 PHARM REV CODE 636 W HCPCS: Mod: HCNC | Performed by: FAMILY MEDICINE

## 2023-11-06 RX ORDER — HYDRALAZINE HYDROCHLORIDE 50 MG/1
50 TABLET, FILM COATED ORAL EVERY 8 HOURS
Qty: 180 TABLET | Refills: 3 | COMMUNITY
Start: 2023-11-06

## 2023-11-06 RX ORDER — HYDRALAZINE HYDROCHLORIDE 25 MG/1
75 TABLET, FILM COATED ORAL EVERY 8 HOURS
Status: DISCONTINUED | OUTPATIENT
Start: 2023-11-06 | End: 2023-11-06

## 2023-11-06 RX ORDER — HYDRALAZINE HYDROCHLORIDE 25 MG/1
50 TABLET, FILM COATED ORAL EVERY 8 HOURS
Status: DISCONTINUED | OUTPATIENT
Start: 2023-11-06 | End: 2023-11-06 | Stop reason: HOSPADM

## 2023-11-06 RX ADMIN — INSULIN ASPART 2 UNITS: 100 INJECTION, SOLUTION INTRAVENOUS; SUBCUTANEOUS at 07:11

## 2023-11-06 RX ADMIN — INSULIN ASPART 6 UNITS: 100 INJECTION, SOLUTION INTRAVENOUS; SUBCUTANEOUS at 11:11

## 2023-11-06 RX ADMIN — HYDRALAZINE HYDROCHLORIDE 50 MG: 25 TABLET, FILM COATED ORAL at 02:11

## 2023-11-06 RX ADMIN — HYDRALAZINE HYDROCHLORIDE 10 MG: 20 INJECTION INTRAMUSCULAR; INTRAVENOUS at 08:11

## 2023-11-06 RX ADMIN — GABAPENTIN 300 MG: 300 CAPSULE ORAL at 09:11

## 2023-11-06 RX ADMIN — ASPIRIN 81 MG CHEWABLE TABLET 81 MG: 81 TABLET CHEWABLE at 09:11

## 2023-11-06 RX ADMIN — GABAPENTIN 300 MG: 300 CAPSULE ORAL at 02:11

## 2023-11-06 RX ADMIN — TICAGRELOR 90 MG: 90 TABLET ORAL at 09:11

## 2023-11-06 RX ADMIN — METOPROLOL SUCCINATE 50 MG: 50 TABLET, EXTENDED RELEASE ORAL at 09:11

## 2023-11-06 RX ADMIN — OXYCODONE HYDROCHLORIDE 5 MG: 5 TABLET ORAL at 09:11

## 2023-11-06 RX ADMIN — FLUOXETINE 40 MG: 20 CAPSULE ORAL at 09:11

## 2023-11-06 RX ADMIN — ISOSORBIDE MONONITRATE 30 MG: 30 TABLET, EXTENDED RELEASE ORAL at 09:11

## 2023-11-06 RX ADMIN — LISINOPRIL 10 MG: 5 TABLET ORAL at 09:11

## 2023-11-06 NOTE — HPI
"Patient is a pleasant 73-year-old lady with past medical history significant for coronary artery disease status post PCI.  Came in with musculoskeletal left-sided chest pain.  Follows the dermatome of her previous shingles infection.  Denies orthopnea, PND, swelling of feet.  Follows with Dr. Rico.  Troponin minimally elevated, flat trend.  States pain is different than the pain she prior to her PCI.  Which was more substernal, pressure-like.  She has not had any such pain since her PCI.  Also stress test done in December of 2022 did not show any significant ischemia.          HPI:  Ms. Contreras is a pleasant 73 yr old female with a hx of CAD, MI x 2 s/p left heart catheterization x 3, HTN, HLD, GERD, anemia, anxiety, depression, arthritis, stroke, CKD, and fibromyalgia and a past surgical hx of bilateral knee surgery and R elbow surgery who presented to the ED for N/V, SOB, and 10/10 constant "sharp, lightening bolt, stinging, heavy" left sided pain that starts under her breast and wraps around her rib cage to her back. She was seen at [facility] ED for painful rash on 10/14/23, was found to have shingles, and was prescribed tylenol and valacyclovir. The pain she is experiencing today is in the same dermatomal pattern as the shingles rash she had previously. She also endorses having chills earlier today. She states the Gabapentin given to her today helped with her pain as well as the Abx that was started and says that Ibuprofen helped at home, but knows that because of her CKD she shouldn't take it. Moving and touching makes the pain worse. She lives with her grandson and his fiance, denies tobacco, alcohol, as well as recreational drugs. She denies fever, hearing/vision changes, cough, CP, diaphoresis, palpitations, diarrhea, urinary frequency, urgency, and dizziness.      In the ED, workup included CBC, CMP, troponin, UA, urine culture, POCT glucose, CXR. Workup revealed BUN 32, creatinine 1.5, GFR of 37, " glucose of 354, ALP of 175, Troponin uptrend from 0.027 to 0.035 to 0.041. UA showed hazy, 2+ protein, 2+ glucose, 3+ occult blood, positive nitrites, 2+ leukocytes, >100 RBCs, 34 WBC, many bacteria. UA reflexed to urine culture. Patient given Rocephin, gabapentin, zofran, and tramadol with some improvement in her pain. Patient will be placed under observation to trend troponin and treat her UTI.        Overview/Hospital Course:  72 y/o female patient admitted due to left sided chest pain which is likely secondary to combination msk and herpes zoster infection for which was completed antiviral treatment. Mild elevation of troponin in ED and trend remained essentially flat. Cardiology consult given hx of cad and awaiting further recommendations. She remained with elevated bp in hospital and was started on procardia however she felt as if her throat was closing due to procardia. Patient was stable with no sign of anaphylactic reaction. Antihypertensive adjusted to acei. Remains with left sided chest pain underneath her breast, mildly erythematous rash which is tender to touch, treated with pain medication with improvement.                       CAD - JESIKA x 2 to RCA 3/29/19 - JESIKA to RI and Cx 1/8/20, HTN, HLD, DM     11/18/19 [facility] - EDP 16, LAD mid 50%, Cx long mid 80-90% - diffusely diseased - similar to [facility] 3/29/19, OM1 90% mid, RCA stents patent 50% beyond stents       [facility] 1/8/20  1.  Successful PCI of proximal ramus with drug-eluting stent x1 (2.0 x 6 mm).  IVUS guidance was utilized for this PCI.  Post PCI IVUS demonstrated well-opposed and expanded stent.  MADINA 3 flow pre and post PCI     2.  Successful PCI of circumflex with drug-eluting stent x1 (2.25 x 22 mm).  MADINA 3 flow pre and post PCI     [facility] 11/18/19  Patent RCA mid stents with 50% lesion after stents  Prox Cx to Dist Cx lesion , 95% stenosed.  Ost 1st Mrg to 1st Mrg lesion , 90% stenosed.  LVEDP (Pre): 16       [facility]: 3-19  Three vessel coronary artery  disease.  Successful PCI for acute myocardial infarction of culprit RCA.  Prox RCA lesion , 99% stenosed reduced to 0%..  A STENT RESOLUTE CRISSY [id number] stent was successfully placed at 14 ROGER  Mid RCA lesion , 95% stenosed reduced to 0%..  A STENT RESOLUTE CRISSY [id number] stent was successfully placed at 12 ROGER  Prox Cx to Dist Cx lesion , 95% stenosed.  Ost 1st Mrg to 1st Mrg lesion , 90% stenosed.  Diffusely diseased LAD which is small vessel as well     Echo 6/20/22  The left ventricle is normal in size with mild concentric hypertrophy and hyperdynamic systolic function.  The estimated ejection fraction is 75%.  Normal right ventricular size with normal right ventricular systolic function.  There is mild aortic valve stenosis.  Aortic valve area is 1.59 cm2; peak velocity is 2.71 m/s; mean gradient is 19 mmHg.  There is moderate mitral stenosis.  The mean diastolic gradient across the mitral valve is 8 mmHg at a heart rate of 90 bpm.  The estimated PA systolic pressure is 58 mmHg.  There is pulmonary hypertension.     Stress test 12/1/22    Normal myocardial perfusion scan. There is no evidence of myocardial ischemia or infarction.    The gated perfusion images showed an ejection fraction of 71% post stress.    The EKG portion of this study is negative for ischemia.    The patient reported no chest pain during the stress test.    There were no arrhythmias during stress.     Holter 7/17/19  Sinus rhythm with heart rates varying between 82 and 136 bpm with an average of 96 bpm.  There were very rare PVCs totalling 21 and averaging 0.44 per hour.  There were very rare PACs totalling 30 and averaging 0.63 per hour.  The diary was returned blank.     Carotid US 5/6/29  There is 20-39% right Internal Carotid Stenosis.  There is 0-19% left Internal Carotid Stenosis.      LE arterial doppler 5/6/19  Hemodynamically significant greater than 70% lesion in the L SFA proximally  Moderate greater than 50% plaque noted in the  right SFA  Noncompressible CLEMENT bilaterally

## 2023-11-06 NOTE — SUBJECTIVE & OBJECTIVE
Interval History:  No anginal sounding chest pains.  Her musculoskeletal chest pain is better as compared to yesterday as per the patient      Past Medical History:   Diagnosis Date    Allergy     Altered mental status 06/19/2022    DYSARTHRIA, SPASTIC MOVEMENTS & DIFFICULTY SWALLOWING    Anemia     Anxiety     Arthritis     Cataract     both removed    Colon polyps     Coronary artery disease     Depression     Diabetes mellitus, type II     Disorder of kidney and ureter     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/2023    Fibromyalgia     Follicular lymphoma     GERD (gastroesophageal reflux disease)     HTN (hypertension)     Hyperlipidemia     MI (myocardial infarction) 03/2019    Personal history of colonic polyps     Restless leg syndrome     Stroke        Past Surgical History:   Procedure Laterality Date    COLONOSCOPY  11/07/2012    Colon polyp found; repeat in 5 years    ELBOW SURGERY Right 2015    dislocation repair     ESOPHAGOGASTRODUODENOSCOPY  11/07/2012    atrophic gastritis, H pylori testing negative    INCISION AND DRAINAGE FOOT Right 6/2/2021    Procedure: INCISION AND DRAINAGE, FOOT, bone biopsy;  Surgeon: Quiana Penn DPM;  Location: [facility] OR;  Service: Podiatry;  Laterality: Right;    KNEE SURGERY Bilateral 2015    scoped    LEFT HEART CATHETERIZATION Left 3/29/2019    Procedure: Left heart cath;  Surgeon: Bladimir Barbosa MD;  Location: [facility] CATH LAB;  Service: Cardiology;  Laterality: Left;    LEFT HEART CATHETERIZATION Left 11/18/2019    Procedure: Left heart cath;  Surgeon: Karl Rico MD;  Location: [facility] CATH LAB;  Service: Cardiology;  Laterality: Left;    LEFT HEART CATHETERIZATION Left 1/8/2020    Procedure: Left heart cath, right radial, noon start;  Surgeon: Christos Monreal MD;  Location: [facility] CATH LAB;  Service: Cardiology;  Laterality: Left;  RN Pre Op 1-6-20.  To be admitted 1-7-20 sor Aspirin Disensitation    TONSILLECTOMY  1955    ULTRASOUND GUIDANCE  1/8/2020    Procedure:  Ultrasound Guidance;  Surgeon: Christos Monreal MD;  Location: [facility] CATH LAB;  Service: Cardiology;;       Review of patient's allergies indicates:   Allergen Reactions    Novolin 70/30 (semi-synthetic) Nausea And Vomiting     Severe vomiting on Relion 70/30    Sulfa (sulfonamide antibiotics) Anaphylaxis    Talwin [pentazocine lactate] Anaphylaxis    Victoza [liraglutide] Nausea And Vomiting    Glipizide Nausea Only    Citric acid     Codeine     Influenza virus vaccines Hives    Iodine and iodide containing products Hives    Levetiracetam Itching    Rituxan [rituximab] Hives    Zoloft [sertraline] Nausea And Vomiting       No current facility-administered medications on file prior to encounter.     Current Outpatient Medications on File Prior to Encounter   Medication Sig    ASCORBIC ACID, VITAMIN C, ORAL Take by mouth once daily.    aspirin 81 MG Chew Take 1 tablet (81 mg total) by mouth once daily.    atenoloL (TENORMIN) 50 MG tablet Take 1 tablet (50 mg total) by mouth 2 (two) times daily.    atorvastatin (LIPITOR) 80 MG tablet Take 1 tablet (80 mg total) by mouth every evening.    Bacillus coagulans (DIGESTIVE ADVANTAGE IMMUNE ORAL) Take by mouth.    cholecalciferol, vitamin D3, (VITAMIN D3) 50 mcg (2,000 unit) Cap Take 2 capsules by mouth 2 (two) times daily.    cyanocobalamin (VITAMIN B-12) 1000 MCG tablet Take 100 mcg by mouth once daily.    diclofenac sodium (VOLTAREN TOP) Apply topically.    dulaglutide (TRULICITY) 3 mg/0.5 mL pen injector Inject 3 mg into the skin every 7 days.    EPINEPHrine (EPIPEN) 0.3 mg/0.3 mL AtIn Inject 0.3 mLs (0.3 mg total) into the muscle once. for 1 dose    ESOMEPRAZOLE MAGNESIUM ORAL Take by mouth as needed.    FLUoxetine 40 MG capsule Take 1 capsule (40 mg total) by mouth once daily.    hydrALAZINE (APRESOLINE) 50 MG tablet Take 1 tablet (50 mg total) by mouth 2 (two) times a day.    insulin detemir U-100, Levemir, (LEVEMIR FLEXPEN) 100 unit/mL (3 mL) InPn pen Inject 15 Units  into the skin every evening.    isosorbide mononitrate (IMDUR) 30 MG 24 hr tablet Take 1 tablet (30 mg total) by mouth once daily.    magnesium oxide (MAG-OX) 400 mg tablet Take 400 mg by mouth once daily.    melatonin 10 mg Cap Take 10 mg by mouth nightly.    metoprolol succinate (TOPROL-XL) 50 MG 24 hr tablet Take 1 tablet by mouth once daily    multivitamin/iron/folic acid (CENTRUM COMPLETE ORAL) Take by mouth.    NOVOLOG FLEXPEN U-100 INSULIN 100 unit/mL (3 mL) InPn pen Inject 20 Units into the skin 3 (three) times daily with meals.    pantoprazole (PROTONIX) 40 MG tablet Take 1 tablet (40 mg total) by mouth once daily.    ticagrelor (BRILINTA) 90 mg tablet Take 1 tablet (90 mg total) by mouth 2 (two) times daily.    vitamin E 1000 UNIT capsule Take 1,000 Units by mouth once daily.     Family History       Problem Relation (Age of Onset)    Allergy (severe) Daughter    Cancer Mother, Father    Diabetes Sister    Heart disease Mother    Hypertension Sister    Lung cancer Brother    No Known Problems Daughter          Tobacco Use    Smoking status: Never    Smokeless tobacco: Never   Substance and Sexual Activity    Alcohol use: Not Currently    Drug use: Never    Sexual activity: Not Currently     Partners: Male     Review of Systems   Constitutional: Negative.   HENT: Negative.     Eyes: Negative.    Cardiovascular:  Positive for chest pain.        Reproducible musculoskeletal chest pain   Respiratory: Negative.     Endocrine: Negative.    Hematologic/Lymphatic: Negative.    Skin: Negative.    Musculoskeletal: Negative.    Gastrointestinal: Negative.    Genitourinary: Negative.    Neurological: Negative.    Psychiatric/Behavioral: Negative.     Allergic/Immunologic: Negative.      Objective:     Vital Signs (Most Recent):  Temp: 98.3 °F (36.8 °C) (11/06/23 1200)  Pulse: 79 (11/06/23 1200)  Resp: 20 (11/06/23 1200)  BP: (!) 141/65 (11/06/23 1200)  SpO2: 97 % (11/06/23 1200) Vital Signs (24h Range):  Temp:  [97.5  "°F (36.4 °C)-98.6 °F (37 °C)] 98.3 °F (36.8 °C)  Pulse:  [78-89] 79  Resp:  [18-20] 20  SpO2:  [96 %-98 %] 97 %  BP: (130-184)/(60-76) 141/65     Weight: 81 kg (178 lb 9.2 oz)  Body mass index is 26.37 kg/m².    SpO2: 97 %         Intake/Output Summary (Last 24 hours) at 11/6/2023 1238  Last data filed at 11/6/2023 0832  Gross per 24 hour   Intake 1789.59 ml   Output 900 ml   Net 889.59 ml         Lines/Drains/Airways       Peripheral Intravenous Line  Duration                  Peripheral IV - Single Lumen 11/04/23 1900 Left;Posterior Forearm 1 day                     Physical Exam  Constitutional:       Appearance: Normal appearance. She is well-developed.   HENT:      Head: Normocephalic.   Eyes:      Pupils: Pupils are equal, round, and reactive to light.   Cardiovascular:      Rate and Rhythm: Normal rate and regular rhythm.      Heart sounds: Murmur heard.   Pulmonary:      Effort: Pulmonary effort is normal.      Breath sounds: Normal breath sounds.   Abdominal:      General: Bowel sounds are normal.      Palpations: Abdomen is soft.      Tenderness: There is no abdominal tenderness.   Musculoskeletal:         General: Tenderness present. Normal range of motion.      Cervical back: Normal range of motion and neck supple.   Skin:     General: Skin is warm.   Neurological:      Mental Status: She is alert and oriented to person, place, and time.          Significant Labs: BMP:   Recent Labs   Lab 11/05/23  0544   *   *   K 4.3      CO2 20*   BUN 29*   CREATININE 1.4   CALCIUM 8.3*     , CMP   Recent Labs   Lab 11/05/23  0544   *   K 4.3      CO2 20*   *   BUN 29*   CREATININE 1.4   CALCIUM 8.3*   ANIONGAP 8     , CBC No results for input(s): "WBC", "HGB", "HCT", "PLT" in the last 48 hours., INR No results for input(s): "INR", "PROTIME" in the last 48 hours., Lipid Panel No results for input(s): "CHOL", "HDL", "LDLCALC", "TRIG", "CHOLHDL" in the last 48 hours., Troponin   No " "results for input(s): "TROPONINI" in the last 48 hours.  , All pertinent lab results from the last 24 hours have been reviewed., and   Recent Lab Results         11/06/23  1134   11/06/23  0730   11/05/23  1942   11/05/23  1652        POCT Glucose 285   184   248   184               Significant Imaging: Echocardiogram: Transthoracic echo (TTE) complete (Cupid Only):   Results for orders placed or performed during the hospital encounter of 11/03/23   Echo   Result Value Ref Range    BSA 1.99 m2    LVOT stroke volume 58.09 cm3    LVIDd 4.33 3.5 - 6.0 cm    LV Systolic Volume 32.06 mL    LV Systolic Volume Index 16.3 mL/m2    LVIDs 2.89 2.1 - 4.0 cm    LV Diastolic Volume 84.22 mL    LV Diastolic Volume Index 42.75 mL/m2    IVS 1.19 (A) 0.6 - 1.1 cm    LVOT diameter 2.00 cm    LVOT area 3.1 cm2    FS 33 28 - 44 %    Left Ventricle Relative Wall Thickness 0.74 cm    Posterior Wall 1.60 (A) 0.6 - 1.1 cm    LV mass 233.36 g    LV Mass Index 118 g/m2    MV Peak E Mike 1.42 m/s    TDI LATERAL 0.06 m/s    TDI SEPTAL 0.05 m/s    E/E' ratio 25.82 m/s    MV Peak A Mike 1.41 m/s    TR Max Mike 2.83 m/s    E/A ratio 1.01     IVRT 95.15 msec    E wave deceleration time 244.20 msec    LV SEPTAL E/E' RATIO 28.40 m/s    LV LATERAL E/E' RATIO 23.67 m/s    LVOT peak mike 0.75 m/s    Left Ventricular Outflow Tract Mean Velocity 0.55 cm/s    Left Ventricular Outflow Tract Mean Gradient 1.38 mmHg    RVDD 3.46 cm    RV S' 15.82 cm/s    TAPSE 2.01 cm    LA size 4.11 cm    Left Atrium Minor Axis 5.85 cm    Left Atrium Major Axis 6.22 cm    RA Major Axis 5.59 cm    AV mean gradient 18 mmHg    AV peak gradient 27 mmHg    Ao peak mike 2.59 m/s    Ao VTI 57.10 cm    LVOT peak VTI 18.50 cm    AV valve area 1.02 cm²    AV Velocity Ratio 0.29     AV index (prosthetic) 0.32     ZENON by Velocity Ratio 0.91 cm²    MV stenosis pressure 1/2 time 70.82 ms    MV valve area p 1/2 method 3.11 cm2    Triscuspid Valve Regurgitation Peak Gradient 32 mmHg    PV PEAK " VELOCITY 1.24 m/s    PV peak gradient 6 mmHg    Sinus 2.75 cm    IVC diameter 1.64 cm    Mean e' 0.06 m/s    ZLVIDS -1.47     ZLVIDD -2.68     LA Volume Index 55.6 mL/m2    LA volume 109.53 cm3    LA WIDTH 5.2 cm    RA Width 3.4 cm    TV resting pulmonary artery pressure 35 mmHg    RV TB RVSP 6 mmHg    Est. RA pres 3 mmHg    Narrative      Left Ventricle: The left ventricle is normal in size. Increased wall   thickness. Moderate septal thickening. Normal wall motion. There is normal   systolic function with a visually estimated ejection fraction of 65 - 70%.   Grade I diastolic dysfunction.    Right Ventricle: Normal right ventricular cavity size. Systolic   function is normal.    Left Atrium: Left atrium is severely dilated.    Aortic Valve: There is moderate stenosis. Aortic valve area by VTI is   1.02 cm². Aortic valve peak velocity is 2.59 m/s. Mean gradient is 18   mmHg. The dimensionless index is 0.32.    Mitral Valve: There is mild regurgitation.    Tricuspid Valve: There is mild regurgitation.    Pulmonary Artery: The estimated pulmonary artery systolic pressure is   35 mmHg.    IVC/SVC: Normal venous pressure at 3 mmHg.

## 2023-11-06 NOTE — NURSING
Pt resting in bed, able to transfer to the commode with assist x1.  BP elevated, PRN medication given.  No distress noted.    [facility]  Nurses Note -- 4 Eyes      11/5/2023       Skin assessed on: Q Shift      [x] No Pressure Injuries Present    []Prevention Measures Documented    [] Yes LDA  for Pressure Injury Previously documented     [] Yes New Pressure Injury Discovered   [] LDA for New Pressure Injury Added      Attending RN:  Marita Smith RN     Second RN:  DIONNA Long

## 2023-11-06 NOTE — SUBJECTIVE & OBJECTIVE
Past Medical History:   Diagnosis Date    Allergy     Altered mental status 06/19/2022    DYSARTHRIA, SPASTIC MOVEMENTS & DIFFICULTY SWALLOWING    Anemia     Anxiety     Arthritis     Cataract     both removed    Colon polyps     Coronary artery disease     Depression     Diabetes mellitus, type II     Disorder of kidney and ureter     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/2023    Fibromyalgia     Follicular lymphoma     GERD (gastroesophageal reflux disease)     HTN (hypertension)     Hyperlipidemia     MI (myocardial infarction) 03/2019    Personal history of colonic polyps     Restless leg syndrome     Stroke        Past Surgical History:   Procedure Laterality Date    COLONOSCOPY  11/07/2012    Colon polyp found; repeat in 5 years    ELBOW SURGERY Right 2015    dislocation repair     ESOPHAGOGASTRODUODENOSCOPY  11/07/2012    atrophic gastritis, H pylori testing negative    INCISION AND DRAINAGE FOOT Right 6/2/2021    Procedure: INCISION AND DRAINAGE, FOOT, bone biopsy;  Surgeon: Quiana Penn DPM;  Location: [facility] OR;  Service: Podiatry;  Laterality: Right;    KNEE SURGERY Bilateral 2015    scoped    LEFT HEART CATHETERIZATION Left 3/29/2019    Procedure: Left heart cath;  Surgeon: Bladimir Barbosa MD;  Location: [facility] CATH LAB;  Service: Cardiology;  Laterality: Left;    LEFT HEART CATHETERIZATION Left 11/18/2019    Procedure: Left heart cath;  Surgeon: Karl Rico MD;  Location: [facility] CATH LAB;  Service: Cardiology;  Laterality: Left;    LEFT HEART CATHETERIZATION Left 1/8/2020    Procedure: Left heart cath, right radial, noon start;  Surgeon: Christos Monreal MD;  Location: [facility] CATH LAB;  Service: Cardiology;  Laterality: Left;  RN Pre Op 1-6-20.  To be admitted 1-7-20 sor Aspirin Disensitation    TONSILLECTOMY  1955    ULTRASOUND GUIDANCE  1/8/2020    Procedure: Ultrasound Guidance;  Surgeon: Christos Monreal MD;  Location: [facility] CATH LAB;  Service: Cardiology;;       Review of patient's allergies indicates:    Allergen Reactions    Novolin 70/30 (semi-synthetic) Nausea And Vomiting     Severe vomiting on Relion 70/30    Sulfa (sulfonamide antibiotics) Anaphylaxis    Talwin [pentazocine lactate] Anaphylaxis    Victoza [liraglutide] Nausea And Vomiting    Glipizide Nausea Only    Citric acid     Codeine     Influenza virus vaccines Hives    Iodine and iodide containing products Hives    Levetiracetam Itching    Rituxan [rituximab] Hives    Zoloft [sertraline] Nausea And Vomiting       No current facility-administered medications on file prior to encounter.     Current Outpatient Medications on File Prior to Encounter   Medication Sig    ASCORBIC ACID, VITAMIN C, ORAL Take by mouth once daily.    aspirin 81 MG Chew Take 1 tablet (81 mg total) by mouth once daily.    atenoloL (TENORMIN) 50 MG tablet Take 1 tablet (50 mg total) by mouth 2 (two) times daily.    atorvastatin (LIPITOR) 80 MG tablet Take 1 tablet (80 mg total) by mouth every evening.    Bacillus coagulans (DIGESTIVE ADVANTAGE IMMUNE ORAL) Take by mouth.    cholecalciferol, vitamin D3, (VITAMIN D3) 50 mcg (2,000 unit) Cap Take 2 capsules by mouth 2 (two) times daily.    cyanocobalamin (VITAMIN B-12) 1000 MCG tablet Take 100 mcg by mouth once daily.    diclofenac sodium (VOLTAREN TOP) Apply topically.    dulaglutide (TRULICITY) 3 mg/0.5 mL pen injector Inject 3 mg into the skin every 7 days.    EPINEPHrine (EPIPEN) 0.3 mg/0.3 mL AtIn Inject 0.3 mLs (0.3 mg total) into the muscle once. for 1 dose    ESOMEPRAZOLE MAGNESIUM ORAL Take by mouth as needed.    FLUoxetine 40 MG capsule Take 1 capsule (40 mg total) by mouth once daily.    hydrALAZINE (APRESOLINE) 50 MG tablet Take 1 tablet (50 mg total) by mouth 2 (two) times a day.    insulin detemir U-100, Levemir, (LEVEMIR FLEXPEN) 100 unit/mL (3 mL) InPn pen Inject 15 Units into the skin every evening.    isosorbide mononitrate (IMDUR) 30 MG 24 hr tablet Take 1 tablet (30 mg total) by mouth once daily.    magnesium  oxide (MAG-OX) 400 mg tablet Take 400 mg by mouth once daily.    melatonin 10 mg Cap Take 10 mg by mouth nightly.    metoprolol succinate (TOPROL-XL) 50 MG 24 hr tablet Take 1 tablet by mouth once daily    multivitamin/iron/folic acid (CENTRUM COMPLETE ORAL) Take by mouth.    NOVOLOG FLEXPEN U-100 INSULIN 100 unit/mL (3 mL) InPn pen Inject 20 Units into the skin 3 (three) times daily with meals.    pantoprazole (PROTONIX) 40 MG tablet Take 1 tablet (40 mg total) by mouth once daily.    ticagrelor (BRILINTA) 90 mg tablet Take 1 tablet (90 mg total) by mouth 2 (two) times daily.    vitamin E 1000 UNIT capsule Take 1,000 Units by mouth once daily.     Family History       Problem Relation (Age of Onset)    Allergy (severe) Daughter    Cancer Mother, Father    Diabetes Sister    Heart disease Mother    Hypertension Sister    Lung cancer Brother    No Known Problems Daughter          Tobacco Use    Smoking status: Never    Smokeless tobacco: Never   Substance and Sexual Activity    Alcohol use: Not Currently    Drug use: Never    Sexual activity: Not Currently     Partners: Male     Review of Systems   Constitutional: Negative.   HENT: Negative.     Eyes: Negative.    Cardiovascular:  Positive for chest pain.        Reproducible musculoskeletal chest pain   Respiratory: Negative.     Endocrine: Negative.    Hematologic/Lymphatic: Negative.    Skin: Negative.    Musculoskeletal: Negative.    Gastrointestinal: Negative.    Genitourinary: Negative.    Neurological: Negative.    Psychiatric/Behavioral: Negative.     Allergic/Immunologic: Negative.      Objective:     Vital Signs (Most Recent):  Temp: 98.3 °F (36.8 °C) (11/05/23 1943)  Pulse: 82 (11/05/23 1943)  Resp: 20 (11/05/23 1943)  BP: (!) 184/74 (11/05/23 1943)  SpO2: 98 % (11/05/23 1943) Vital Signs (24h Range):  Temp:  [97.5 °F (36.4 °C)-98.3 °F (36.8 °C)] 98.3 °F (36.8 °C)  Pulse:  [76-87] 82  Resp:  [16-20] 20  SpO2:  [97 %-99 %] 98 %  BP: (135-184)/(56-77) 184/74  "    Weight: 81 kg (178 lb 9.2 oz)  Body mass index is 26.37 kg/m².    SpO2: 98 %         Intake/Output Summary (Last 24 hours) at 11/5/2023 2219  Last data filed at 11/5/2023 1839  Gross per 24 hour   Intake 1549.59 ml   Output 875 ml   Net 674.59 ml       Lines/Drains/Airways       Peripheral Intravenous Line  Duration                  Peripheral IV - Single Lumen 11/04/23 1900 Left;Posterior Forearm 1 day                     Physical Exam  Constitutional:       Appearance: Normal appearance. She is well-developed.   HENT:      Head: Normocephalic.   Eyes:      Pupils: Pupils are equal, round, and reactive to light.   Cardiovascular:      Rate and Rhythm: Normal rate and regular rhythm.   Pulmonary:      Effort: Pulmonary effort is normal.      Breath sounds: Normal breath sounds.   Abdominal:      General: Bowel sounds are normal.      Palpations: Abdomen is soft.      Tenderness: There is no abdominal tenderness.   Musculoskeletal:         General: Tenderness present. Normal range of motion.      Cervical back: Normal range of motion and neck supple.   Skin:     General: Skin is warm.   Neurological:      Mental Status: She is alert and oriented to person, place, and time.          Significant Labs: BMP:   Recent Labs   Lab 11/05/23  0544   *   *   K 4.3      CO2 20*   BUN 29*   CREATININE 1.4   CALCIUM 8.3*   , CMP   Recent Labs   Lab 11/05/23  0544   *   K 4.3      CO2 20*   *   BUN 29*   CREATININE 1.4   CALCIUM 8.3*   ANIONGAP 8   , CBC No results for input(s): "WBC", "HGB", "HCT", "PLT" in the last 48 hours., INR No results for input(s): "INR", "PROTIME" in the last 48 hours., Lipid Panel No results for input(s): "CHOL", "HDL", "LDLCALC", "TRIG", "CHOLHDL" in the last 48 hours., Troponin   Recent Labs   Lab 11/04/23  0303 11/04/23  0638   TROPONINI 0.043* 0.037*   , All pertinent lab results from the last 24 hours have been reviewed., and   Recent Lab Results  (Last 5 " results in the past 24 hours)        11/05/23  1942   11/05/23  1652   11/05/23  1141   11/05/23  0926   11/05/23  0544        Anion Gap         8       BUN         29       Calcium         8.3       Chloride         107       CO2         20       Creatinine         1.4       eGFR         40       Glucose         286       POCT Glucose 248   184   223   328         Potassium         4.3       Sodium         135                              Significant Imaging: Echocardiogram: Transthoracic echo (TTE) complete (Cupid Only):   Results for orders placed or performed during the hospital encounter of 11/03/23   Echo   Result Value Ref Range    BSA 1.99 m2    LVOT stroke volume 58.09 cm3    LVIDd 4.33 3.5 - 6.0 cm    LV Systolic Volume 32.06 mL    LV Systolic Volume Index 16.3 mL/m2    LVIDs 2.89 2.1 - 4.0 cm    LV Diastolic Volume 84.22 mL    LV Diastolic Volume Index 42.75 mL/m2    IVS 1.19 (A) 0.6 - 1.1 cm    LVOT diameter 2.00 cm    LVOT area 3.1 cm2    FS 33 28 - 44 %    Left Ventricle Relative Wall Thickness 0.74 cm    Posterior Wall 1.60 (A) 0.6 - 1.1 cm    LV mass 233.36 g    LV Mass Index 118 g/m2    MV Peak E Mike 1.42 m/s    TDI LATERAL 0.06 m/s    TDI SEPTAL 0.05 m/s    E/E' ratio 25.82 m/s    MV Peak A Mike 1.41 m/s    TR Max Mike 2.83 m/s    E/A ratio 1.01     IVRT 95.15 msec    E wave deceleration time 244.20 msec    LV SEPTAL E/E' RATIO 28.40 m/s    LV LATERAL E/E' RATIO 23.67 m/s    LVOT peak mike 0.75 m/s    Left Ventricular Outflow Tract Mean Velocity 0.55 cm/s    Left Ventricular Outflow Tract Mean Gradient 1.38 mmHg    RVDD 3.46 cm    RV S' 15.82 cm/s    TAPSE 2.01 cm    LA size 4.11 cm    Left Atrium Minor Axis 5.85 cm    Left Atrium Major Axis 6.22 cm    RA Major Axis 5.59 cm    AV mean gradient 18 mmHg    AV peak gradient 27 mmHg    Ao peak mike 2.59 m/s    Ao VTI 57.10 cm    LVOT peak VTI 18.50 cm    AV valve area 1.02 cm²    AV Velocity Ratio 0.29     AV index (prosthetic) 0.32     ZENON by Velocity  Ratio 0.91 cm²    MV stenosis pressure 1/2 time 70.82 ms    MV valve area p 1/2 method 3.11 cm2    Triscuspid Valve Regurgitation Peak Gradient 32 mmHg    PV PEAK VELOCITY 1.24 m/s    PV peak gradient 6 mmHg    Sinus 2.75 cm    IVC diameter 1.64 cm    Mean e' 0.06 m/s    ZLVIDS -1.47     ZLVIDD -2.68     LA Volume Index 55.6 mL/m2    LA volume 109.53 cm3    LA WIDTH 5.2 cm    RA Width 3.4 cm

## 2023-11-06 NOTE — CONSULTS
"[facility] Surg  Cardiology  Consult Note    Patient Name: Lorena Contreras  MRN: [id number]  Admission Date: 11/3/2023  Hospital Length of Stay: 0 days  Code Status: Full Code   Attending Provider: Wilbur Montoya MD   Consulting Provider: Christos Monreal MD  Primary Care Physician: Thang Zee MD  Principal Problem:Elevated troponin    Patient information was obtained from patient and ER records.     Inpatient consult to Cardiology  Consult performed by: Christos Monreal MD  Consult ordered by: Wilbur Montoya MD        Subjective:     Chief Complaint:  cp     HPI:   Patient is a pleasant 73-year-old lady with past medical history significant for coronary artery disease status post PCI.  Came in with musculoskeletal left-sided chest pain.  Follows the dermatome of her previous shingles infection.  Denies orthopnea, PND, swelling of feet.  Follows with Dr. Rico.  Troponin minimally elevated, flat trend.  States pain is different than the pain she prior to her PCI.  Which was more substernal, pressure-like.  She has not had any such pain since her PCI.  Also stress test done in December of 2022 did not show any significant ischemia.          HPI:  Ms. Contreras is a pleasant 73 yr old female with a hx of CAD, MI x 2 s/p left heart catheterization x 3, HTN, HLD, GERD, anemia, anxiety, depression, arthritis, stroke, CKD, and fibromyalgia and a past surgical hx of bilateral knee surgery and R elbow surgery who presented to the ED for N/V, SOB, and 10/10 constant "sharp, lightening bolt, stinging, heavy" left sided pain that starts under her breast and wraps around her rib cage to her back. She was seen at [place] ED for painful rash on 10/14/23, was found to have shingles, and was prescribed tylenol and valacyclovir. The pain she is experiencing today is in the same dermatomal pattern as the shingles rash she had previously. She also endorses having chills earlier today. She states the Gabapentin given to " her today helped with her pain as well as the Abx that was started and says that Ibuprofen helped at home, but knows that because of her CKD she shouldn't take it. Moving and touching makes the pain worse. She lives with her grandson and his fiance, denies tobacco, alcohol, as well as recreational drugs. She denies fever, hearing/vision changes, cough, CP, diaphoresis, palpitations, diarrhea, urinary frequency, urgency, and dizziness.      In the ED, workup included CBC, CMP, troponin, UA, urine culture, POCT glucose, CXR. Workup revealed BUN 32, creatinine 1.5, GFR of 37, glucose of 354, ALP of 175, Troponin uptrend from 0.027 to 0.035 to 0.041. UA showed hazy, 2+ protein, 2+ glucose, 3+ occult blood, positive nitrites, 2+ leukocytes, >100 RBCs, 34 WBC, many bacteria. UA reflexed to urine culture. Patient given Rocephin, gabapentin, zofran, and tramadol with some improvement in her pain. Patient will be placed under observation to trend troponin and treat her UTI.        Overview/Hospital Course:  74 y/o female patient admitted due to left sided chest pain which is likely secondary to combination msk and herpes zoster infection for which was completed antiviral treatment. Mild elevation of troponin in ED and trend remained essentially flat. Cardiology consult given hx of cad and awaiting further recommendations. She remained with elevated bp in hospital and was started on procardia however she felt as if her throat was closing due to procardia. Patient was stable with no sign of anaphylactic reaction. Antihypertensive adjusted to acei. Remains with left sided chest pain underneath her breast, mildly erythematous rash which is tender to touch, treated with pain medication with improvement.                       CAD - JESIKA x 2 to RCA 3/29/19 - JESIKA to RI and Cx 1/8/20, HTN, HLD, DM     11/18/19 [facility] - EDP 16, LAD mid 50%, Cx long mid 80-90% - diffusely diseased - similar to [facility] 3/29/19, OM1 90% mid, RCA stents patent  50% beyond stents       [facility] 1/8/20  1.  Successful PCI of proximal ramus with drug-eluting stent x1 (2.0 x 6 mm).  IVUS guidance was utilized for this PCI.  Post PCI IVUS demonstrated well-opposed and expanded stent.  MADINA 3 flow pre and post PCI     2.  Successful PCI of circumflex with drug-eluting stent x1 (2.25 x 22 mm).  MADINA 3 flow pre and post PCI     [facility] 11/18/19   Patent RCA mid stents with 50% lesion after stents   Prox Cx to Dist Cx lesion , 95% stenosed.   Ost 1st Mrg to 1st Mrg lesion , 90% stenosed.   LVEDP (Pre): 16       LHC: 3-19   Three vessel coronary artery disease.   Successful PCI for acute myocardial infarction of culprit RCA.   Prox RCA lesion , 99% stenosed reduced to 0%..   A STENT RESOLUTE CRISSY [id number] stent was successfully placed at 14 ROGER   Mid RCA lesion , 95% stenosed reduced to 0%..   A STENT RESOLUTE CRISSY [id number] stent was successfully placed at 12 ROGER   Prox Cx to Dist Cx lesion , 95% stenosed.   Ost 1st Mrg to 1st Mrg lesion , 90% stenosed.   Diffusely diseased LAD which is small vessel as well     Echo 6/20/22   The left ventricle is normal in size with mild concentric hypertrophy and hyperdynamic systolic function.   The estimated ejection fraction is 75%.   Normal right ventricular size with normal right ventricular systolic function.   There is mild aortic valve stenosis.   Aortic valve area is 1.59 cm2; peak velocity is 2.71 m/s; mean gradient is 19 mmHg.   There is moderate mitral stenosis.   The mean diastolic gradient across the mitral valve is 8 mmHg at a heart rate of 90 bpm.   The estimated PA systolic pressure is 58 mmHg.   There is pulmonary hypertension.     Stress test 12/1/22    Normal myocardial perfusion scan. There is no evidence of myocardial ischemia or infarction.    The gated perfusion images showed an ejection fraction of 71% post stress.    The EKG portion of this study is negative for ischemia.    The patient reported no chest  pain during the stress test.    There were no arrhythmias during stress.     Holter 7/17/19   Sinus rhythm with heart rates varying between 82 and 136 bpm with an average of 96 bpm.   There were very rare PVCs totalling 21 and averaging 0.44 per hour.   There were very rare PACs totalling 30 and averaging 0.63 per hour.   The diary was returned blank.     Carotid US 5/6/29   There is 20-39% right Internal Carotid Stenosis.   There is 0-19% left Internal Carotid Stenosis.      LE arterial doppler 5/6/19   Hemodynamically significant greater than 70% lesion in the L SFA proximally   Moderate greater than 50% plaque noted in the right SFA   Noncompressible CLEMENT bilaterally      Past Medical History:   Diagnosis Date    Allergy     Altered mental status 06/19/2022    DYSARTHRIA, SPASTIC MOVEMENTS & DIFFICULTY SWALLOWING    Anemia     Anxiety     Arthritis     Cataract     both removed    Colon polyps     Coronary artery disease     Depression     Diabetes mellitus, type II     Disorder of kidney and ureter     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/2023    Fibromyalgia     Follicular lymphoma     GERD (gastroesophageal reflux disease)     HTN (hypertension)     Hyperlipidemia     MI (myocardial infarction) 03/2019    Personal history of colonic polyps     Restless leg syndrome     Stroke        Past Surgical History:   Procedure Laterality Date    COLONOSCOPY  11/07/2012    Colon polyp found; repeat in 5 years    ELBOW SURGERY Right 2015    dislocation repair     ESOPHAGOGASTRODUODENOSCOPY  11/07/2012    atrophic gastritis, H pylori testing negative    INCISION AND DRAINAGE FOOT Right 6/2/2021    Procedure: INCISION AND DRAINAGE, FOOT, bone biopsy;  Surgeon: Quiana Penn DPM;  Location: [facility];  Service: Podiatry;  Laterality: Right;    KNEE SURGERY Bilateral 2015    scoped    LEFT HEART CATHETERIZATION Left 3/29/2019    Procedure: Left heart cath;  Surgeon: Bladimir Barbosa MD;   Location: [facility] CATH LAB;  Service: Cardiology;  Laterality: Left;    LEFT HEART CATHETERIZATION Left 11/18/2019    Procedure: Left heart cath;  Surgeon: Karl Rico MD;  Location: [facility] CATH LAB;  Service: Cardiology;  Laterality: Left;    LEFT HEART CATHETERIZATION Left 1/8/2020    Procedure: Left heart cath, right radial, noon start;  Surgeon: Christos Monreal MD;  Location: [facility] CATH LAB;  Service: Cardiology;  Laterality: Left;  RN Pre Op 1-6-20.  To be admitted 1-7-20 sor Aspirin Disensitation    TONSILLECTOMY  1955    ULTRASOUND GUIDANCE  1/8/2020    Procedure: Ultrasound Guidance;  Surgeon: Christos Monreal MD;  Location: [facility] CATH LAB;  Service: Cardiology;;       Review of patient's allergies indicates:   Allergen Reactions    Novolin 70/30 (semi-synthetic) Nausea And Vomiting     Severe vomiting on Relion 70/30    Sulfa (sulfonamide antibiotics) Anaphylaxis    Talwin [pentazocine lactate] Anaphylaxis    Victoza [liraglutide] Nausea And Vomiting    Glipizide Nausea Only    Citric acid     Codeine     Influenza virus vaccines Hives    Iodine and iodide containing products Hives    Levetiracetam Itching    Rituxan [rituximab] Hives    Zoloft [sertraline] Nausea And Vomiting       No current facility-administered medications on file prior to encounter.     Current Outpatient Medications on File Prior to Encounter   Medication Sig    ASCORBIC ACID, VITAMIN C, ORAL Take by mouth once daily.    aspirin 81 MG Chew Take 1 tablet (81 mg total) by mouth once daily.    atenoloL (TENORMIN) 50 MG tablet Take 1 tablet (50 mg total) by mouth 2 (two) times daily.    atorvastatin (LIPITOR) 80 MG tablet Take 1 tablet (80 mg total) by mouth every evening.    Bacillus coagulans (DIGESTIVE ADVANTAGE IMMUNE ORAL) Take by mouth.    cholecalciferol, vitamin D3, (VITAMIN D3) 50 mcg (2,000 unit) Cap Take 2 capsules by mouth 2 (two) times daily.    cyanocobalamin (VITAMIN B-12) 1000 MCG tablet Take 100 mcg by  mouth once daily.    diclofenac sodium (VOLTAREN TOP) Apply topically.    dulaglutide (TRULICITY) 3 mg/0.5 mL pen injector Inject 3 mg into the skin every 7 days.    EPINEPHrine (EPIPEN) 0.3 mg/0.3 mL AtIn Inject 0.3 mLs (0.3 mg total) into the muscle once. for 1 dose    ESOMEPRAZOLE MAGNESIUM ORAL Take by mouth as needed.    FLUoxetine 40 MG capsule Take 1 capsule (40 mg total) by mouth once daily.    hydrALAZINE (APRESOLINE) 50 MG tablet Take 1 tablet (50 mg total) by mouth 2 (two) times a day.    insulin detemir U-100, Levemir, (LEVEMIR FLEXPEN) 100 unit/mL (3 mL) InPn pen Inject 15 Units into the skin every evening.    isosorbide mononitrate (IMDUR) 30 MG 24 hr tablet Take 1 tablet (30 mg total) by mouth once daily.    magnesium oxide (MAG-OX) 400 mg tablet Take 400 mg by mouth once daily.    melatonin 10 mg Cap Take 10 mg by mouth nightly.    metoprolol succinate (TOPROL-XL) 50 MG 24 hr tablet Take 1 tablet by mouth once daily    multivitamin/iron/folic acid (CENTRUM COMPLETE ORAL) Take by mouth.    NOVOLOG FLEXPEN U-100 INSULIN 100 unit/mL (3 mL) InPn pen Inject 20 Units into the skin 3 (three) times daily with meals.    pantoprazole (PROTONIX) 40 MG tablet Take 1 tablet (40 mg total) by mouth once daily.    ticagrelor (BRILINTA) 90 mg tablet Take 1 tablet (90 mg total) by mouth 2 (two) times daily.    vitamin E 1000 UNIT capsule Take 1,000 Units by mouth once daily.     Family History       Problem Relation (Age of Onset)    Allergy (severe) Daughter    Cancer Mother, Father    Diabetes Sister    Heart disease Mother    Hypertension Sister    Lung cancer Brother    No Known Problems Daughter          Tobacco Use    Smoking status: Never    Smokeless tobacco: Never   Substance and Sexual Activity    Alcohol use: Not Currently    Drug use: Never    Sexual activity: Not Currently     Partners: Male     Review of Systems   Constitutional: Negative.   HENT: Negative.     Eyes: Negative.     Cardiovascular:  Positive for chest pain.        Reproducible musculoskeletal chest pain   Respiratory: Negative.     Endocrine: Negative.    Hematologic/Lymphatic: Negative.    Skin: Negative.    Musculoskeletal: Negative.    Gastrointestinal: Negative.    Genitourinary: Negative.    Neurological: Negative.    Psychiatric/Behavioral: Negative.     Allergic/Immunologic: Negative.      Objective:     Vital Signs (Most Recent):  Temp: 98.3 °F (36.8 °C) (11/05/23 1943)  Pulse: 82 (11/05/23 1943)  Resp: 20 (11/05/23 1943)  BP: (!) 184/74 (11/05/23 1943)  SpO2: 98 % (11/05/23 1943) Vital Signs (24h Range):  Temp:  [97.5 °F (36.4 °C)-98.3 °F (36.8 °C)] 98.3 °F (36.8 °C)  Pulse:  [76-87] 82  Resp:  [16-20] 20  SpO2:  [97 %-99 %] 98 %  BP: (135-184)/(56-77) 184/74     Weight: 81 kg (178 lb 9.2 oz)  Body mass index is 26.37 kg/m².    SpO2: 98 %         Intake/Output Summary (Last 24 hours) at 11/5/2023 2219  Last data filed at 11/5/2023 1839  Gross per 24 hour   Intake 1549.59 ml   Output 875 ml   Net 674.59 ml       Lines/Drains/Airways       Peripheral Intravenous Line  Duration                  Peripheral IV - Single Lumen 11/04/23 1900 Left;Posterior Forearm 1 day                     Physical Exam  Constitutional:       Appearance: Normal appearance. She is well-developed.   HENT:      Head: Normocephalic.   Eyes:      Pupils: Pupils are equal, round, and reactive to light.   Cardiovascular:      Rate and Rhythm: Normal rate and regular rhythm.   Pulmonary:      Effort: Pulmonary effort is normal.      Breath sounds: Normal breath sounds.   Abdominal:      General: Bowel sounds are normal.      Palpations: Abdomen is soft.      Tenderness: There is no abdominal tenderness.   Musculoskeletal:         General: Tenderness present. Normal range of motion.      Cervical back: Normal range of motion and neck supple.   Skin:     General: Skin is warm.   Neurological:      Mental Status: She is alert and oriented to person,  "place, and time.          Significant Labs: BMP:   Recent Labs   Lab 11/05/23  0544   *   *   K 4.3      CO2 20*   BUN 29*   CREATININE 1.4   CALCIUM 8.3*   , CMP   Recent Labs   Lab 11/05/23  0544   *   K 4.3      CO2 20*   *   BUN 29*   CREATININE 1.4   CALCIUM 8.3*   ANIONGAP 8   , CBC No results for input(s): "WBC", "HGB", "HCT", "PLT" in the last 48 hours., INR No results for input(s): "INR", "PROTIME" in the last 48 hours., Lipid Panel No results for input(s): "CHOL", "HDL", "LDLCALC", "TRIG", "CHOLHDL" in the last 48 hours., Troponin   Recent Labs   Lab 11/04/23  0303 11/04/23  0638   TROPONINI 0.043* 0.037*   , All pertinent lab results from the last 24 hours have been reviewed., and   Recent Lab Results  (Last 5 results in the past 24 hours)        11/05/23  1942   11/05/23  1652   11/05/23  1141   11/05/23  0926   11/05/23  0544        Anion Gap         8       BUN         29       Calcium         8.3       Chloride         107       CO2         20       Creatinine         1.4       eGFR         40       Glucose         286       POCT Glucose 248   184   223   328         Potassium         4.3       Sodium         135                              Significant Imaging: Echocardiogram: Transthoracic echo (TTE) complete (Cupid Only):   Results for orders placed or performed during the hospital encounter of 11/03/23   Echo   Result Value Ref Range    BSA 1.99 m2    LVOT stroke volume 58.09 cm3    LVIDd 4.33 3.5 - 6.0 cm    LV Systolic Volume 32.06 mL    LV Systolic Volume Index 16.3 mL/m2    LVIDs 2.89 2.1 - 4.0 cm    LV Diastolic Volume 84.22 mL    LV Diastolic Volume Index 42.75 mL/m2    IVS 1.19 (A) 0.6 - 1.1 cm    LVOT diameter 2.00 cm    LVOT area 3.1 cm2    FS 33 28 - 44 %    Left Ventricle Relative Wall Thickness 0.74 cm    Posterior Wall 1.60 (A) 0.6 - 1.1 cm    LV mass 233.36 g    LV Mass Index 118 g/m2    MV Peak E Mike 1.42 m/s    TDI LATERAL 0.06 m/s    TDI " SEPTAL 0.05 m/s    E/E' ratio 25.82 m/s    MV Peak A Mike 1.41 m/s    TR Max Mike 2.83 m/s    E/A ratio 1.01     IVRT 95.15 msec    E wave deceleration time 244.20 msec    LV SEPTAL E/E' RATIO 28.40 m/s    LV LATERAL E/E' RATIO 23.67 m/s    LVOT peak mike 0.75 m/s    Left Ventricular Outflow Tract Mean Velocity 0.55 cm/s    Left Ventricular Outflow Tract Mean Gradient 1.38 mmHg    RVDD 3.46 cm    RV S' 15.82 cm/s    TAPSE 2.01 cm    LA size 4.11 cm    Left Atrium Minor Axis 5.85 cm    Left Atrium Major Axis 6.22 cm    RA Major Axis 5.59 cm    AV mean gradient 18 mmHg    AV peak gradient 27 mmHg    Ao peak mike 2.59 m/s    Ao VTI 57.10 cm    LVOT peak VTI 18.50 cm    AV valve area 1.02 cm²    AV Velocity Ratio 0.29     AV index (prosthetic) 0.32     ZENON by Velocity Ratio 0.91 cm²    MV stenosis pressure 1/2 time 70.82 ms    MV valve area p 1/2 method 3.11 cm2    Triscuspid Valve Regurgitation Peak Gradient 32 mmHg    PV PEAK VELOCITY 1.24 m/s    PV peak gradient 6 mmHg    Sinus 2.75 cm    IVC diameter 1.64 cm    Mean e' 0.06 m/s    ZLVIDS -1.47     ZLVIDD -2.68     LA Volume Index 55.6 mL/m2    LA volume 109.53 cm3    LA WIDTH 5.2 cm    RA Width 3.4 cm     Assessment and Plan:     * Elevated troponin  Mildly elevated, flat trend.  Does not represent acute coronary syndrome.  Her chest pain is musculoskeletal in nature.  Recent stress test December 2022 did not show significant ischemia.  Patient states that her musculoskeletal pain is very different than prior anginal pain.  No further ischemic workup is indicated during this hospitalization unless echo shows significant new wall motion abnormalities.    Post herpetic neuralgia        Stage 3a chronic kidney disease        Type 2 diabetes mellitus with stage 3 chronic kidney disease, with long-term current use of insulin        Fibromyalgia        Hypertension associated with diabetes            VTE Risk Mitigation (From admission, onward)         Ordered     enoxaparin  injection 40 mg  Daily         11/03/23 2012     IP VTE HIGH RISK PATIENT  Once         11/03/23 2012     Place sequential compression device  Until discontinued         11/03/23 2012                Thank you for your consult. I will follow-up with patient. Please contact us if you have any additional questions.    Christos Monreal MD  Cardiology   [facility] Surg

## 2023-11-06 NOTE — NURSING
Patient discharged per MD order.  IV removed.  Catheter tip intact.  No distress noted.  Discharge instructions explained by Jessi Lorenzo RN.  AVS given to patient.  Patient verbalized understanding.  VSS. Afebrile.  No complaints of pain, N/V, diarrhea, or SOB. Patient left with belongings to Main Entrance via wheelchair per nurse.  Family with patient.

## 2023-11-06 NOTE — PROGRESS NOTES
[facility]  Cardiology  Progress Note    Patient Name: Lorena Contreras  MRN: [id number]  Admission Date: 11/3/2023  Hospital Length of Stay: 0 days  Code Status: Full Code   Attending Physician: Inga White MD   Primary Care Physician: Thang Zee MD  Expected Discharge Date:   Principal Problem:Elevated troponin    Subjective:       Interval History:  No anginal sounding chest pains.  Her musculoskeletal chest pain is better as compared to yesterday as per the patient      Past Medical History:   Diagnosis Date    Allergy     Altered mental status 06/19/2022    DYSARTHRIA, SPASTIC MOVEMENTS & DIFFICULTY SWALLOWING    Anemia     Anxiety     Arthritis     Cataract     both removed    Colon polyps     Coronary artery disease     Depression     Diabetes mellitus, type II     Disorder of kidney and ureter     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/2023    Fibromyalgia     Follicular lymphoma     GERD (gastroesophageal reflux disease)     HTN (hypertension)     Hyperlipidemia     MI (myocardial infarction) 03/2019    Personal history of colonic polyps     Restless leg syndrome     Stroke        Past Surgical History:   Procedure Laterality Date    COLONOSCOPY  11/07/2012    Colon polyp found; repeat in 5 years    ELBOW SURGERY Right 2015    dislocation repair     ESOPHAGOGASTRODUODENOSCOPY  11/07/2012    atrophic gastritis, H pylori testing negative    INCISION AND DRAINAGE FOOT Right 6/2/2021    Procedure: INCISION AND DRAINAGE, FOOT, bone biopsy;  Surgeon: Quiana Penn DPM;  Location: [facility] OR;  Service: Podiatry;  Laterality: Right;    KNEE SURGERY Bilateral 2015    scoped    LEFT HEART CATHETERIZATION Left 3/29/2019    Procedure: Left heart cath;  Surgeon: Bladimir Barbosa MD;  Location: [facility] CATH LAB;  Service: Cardiology;  Laterality: Left;    LEFT HEART CATHETERIZATION Left 11/18/2019    Procedure: Left heart cath;  Surgeon: Karl Rico MD;  Location: [facility]  CATH LAB;  Service: Cardiology;  Laterality: Left;    LEFT HEART CATHETERIZATION Left 1/8/2020    Procedure: Left heart cath, right radial, noon start;  Surgeon: Christos Monreal MD;  Location: [facility] CATH LAB;  Service: Cardiology;  Laterality: Left;  RN Pre Op 1-6-20.  To be admitted 1-7-20 sor Aspirin Disensitation    TONSILLECTOMY  1955    ULTRASOUND GUIDANCE  1/8/2020    Procedure: Ultrasound Guidance;  Surgeon: Christos Monreal MD;  Location: [facility] CATH LAB;  Service: Cardiology;;       Review of patient's allergies indicates:   Allergen Reactions    Novolin 70/30 (semi-synthetic) Nausea And Vomiting     Severe vomiting on Relion 70/30    Sulfa (sulfonamide antibiotics) Anaphylaxis    Talwin [pentazocine lactate] Anaphylaxis    Victoza [liraglutide] Nausea And Vomiting    Glipizide Nausea Only    Citric acid     Codeine     Influenza virus vaccines Hives    Iodine and iodide containing products Hives    Levetiracetam Itching    Rituxan [rituximab] Hives    Zoloft [sertraline] Nausea And Vomiting       No current facility-administered medications on file prior to encounter.     Current Outpatient Medications on File Prior to Encounter   Medication Sig    ASCORBIC ACID, VITAMIN C, ORAL Take by mouth once daily.    aspirin 81 MG Chew Take 1 tablet (81 mg total) by mouth once daily.    atenoloL (TENORMIN) 50 MG tablet Take 1 tablet (50 mg total) by mouth 2 (two) times daily.    atorvastatin (LIPITOR) 80 MG tablet Take 1 tablet (80 mg total) by mouth every evening.    Bacillus coagulans (DIGESTIVE ADVANTAGE IMMUNE ORAL) Take by mouth.    cholecalciferol, vitamin D3, (VITAMIN D3) 50 mcg (2,000 unit) Cap Take 2 capsules by mouth 2 (two) times daily.    cyanocobalamin (VITAMIN B-12) 1000 MCG tablet Take 100 mcg by mouth once daily.    diclofenac sodium (VOLTAREN TOP) Apply topically.    dulaglutide (TRULICITY) 3 mg/0.5 mL pen injector Inject 3 mg into the skin every 7 days.    EPINEPHrine (EPIPEN)  0.3 mg/0.3 mL AtIn Inject 0.3 mLs (0.3 mg total) into the muscle once. for 1 dose    ESOMEPRAZOLE MAGNESIUM ORAL Take by mouth as needed.    FLUoxetine 40 MG capsule Take 1 capsule (40 mg total) by mouth once daily.    hydrALAZINE (APRESOLINE) 50 MG tablet Take 1 tablet (50 mg total) by mouth 2 (two) times a day.    insulin detemir U-100, Levemir, (LEVEMIR FLEXPEN) 100 unit/mL (3 mL) InPn pen Inject 15 Units into the skin every evening.    isosorbide mononitrate (IMDUR) 30 MG 24 hr tablet Take 1 tablet (30 mg total) by mouth once daily.    magnesium oxide (MAG-OX) 400 mg tablet Take 400 mg by mouth once daily.    melatonin 10 mg Cap Take 10 mg by mouth nightly.    metoprolol succinate (TOPROL-XL) 50 MG 24 hr tablet Take 1 tablet by mouth once daily    multivitamin/iron/folic acid (CENTRUM COMPLETE ORAL) Take by mouth.    NOVOLOG FLEXPEN U-100 INSULIN 100 unit/mL (3 mL) InPn pen Inject 20 Units into the skin 3 (three) times daily with meals.    pantoprazole (PROTONIX) 40 MG tablet Take 1 tablet (40 mg total) by mouth once daily.    ticagrelor (BRILINTA) 90 mg tablet Take 1 tablet (90 mg total) by mouth 2 (two) times daily.    vitamin E 1000 UNIT capsule Take 1,000 Units by mouth once daily.     Family History       Problem Relation (Age of Onset)    Allergy (severe) Daughter    Cancer Mother, Father    Diabetes Sister    Heart disease Mother    Hypertension Sister    Lung cancer Brother    No Known Problems Daughter          Tobacco Use    Smoking status: Never    Smokeless tobacco: Never   Substance and Sexual Activity    Alcohol use: Not Currently    Drug use: Never    Sexual activity: Not Currently     Partners: Male     Review of Systems   Constitutional: Negative.   HENT: Negative.     Eyes: Negative.    Cardiovascular:  Positive for chest pain.        Reproducible musculoskeletal chest pain   Respiratory: Negative.     Endocrine: Negative.    Hematologic/Lymphatic: Negative.    Skin: Negative.     Musculoskeletal: Negative.    Gastrointestinal: Negative.    Genitourinary: Negative.    Neurological: Negative.    Psychiatric/Behavioral: Negative.     Allergic/Immunologic: Negative.      Objective:     Vital Signs (Most Recent):  Temp: 98.3 °F (36.8 °C) (11/06/23 1200)  Pulse: 79 (11/06/23 1200)  Resp: 20 (11/06/23 1200)  BP: (!) 141/65 (11/06/23 1200)  SpO2: 97 % (11/06/23 1200) Vital Signs (24h Range):  Temp:  [97.5 °F (36.4 °C)-98.6 °F (37 °C)] 98.3 °F (36.8 °C)  Pulse:  [78-89] 79  Resp:  [18-20] 20  SpO2:  [96 %-98 %] 97 %  BP: (130-184)/(60-76) 141/65     Weight: 81 kg (178 lb 9.2 oz)  Body mass index is 26.37 kg/m².    SpO2: 97 %         Intake/Output Summary (Last 24 hours) at 11/6/2023 1238  Last data filed at 11/6/2023 0832  Gross per 24 hour   Intake 1789.59 ml   Output 900 ml   Net 889.59 ml         Lines/Drains/Airways       Peripheral Intravenous Line  Duration                  Peripheral IV - Single Lumen 11/04/23 1900 Left;Posterior Forearm 1 day                     Physical Exam  Constitutional:       Appearance: Normal appearance. She is well-developed.   HENT:      Head: Normocephalic.   Eyes:      Pupils: Pupils are equal, round, and reactive to light.   Cardiovascular:      Rate and Rhythm: Normal rate and regular rhythm.      Heart sounds: Murmur heard.   Pulmonary:      Effort: Pulmonary effort is normal.      Breath sounds: Normal breath sounds.   Abdominal:      General: Bowel sounds are normal.      Palpations: Abdomen is soft.      Tenderness: There is no abdominal tenderness.   Musculoskeletal:         General: Tenderness present. Normal range of motion.      Cervical back: Normal range of motion and neck supple.   Skin:     General: Skin is warm.   Neurological:      Mental Status: She is alert and oriented to person, place, and time.          Significant Labs: BMP:   Recent Labs   Lab 11/05/23  0544   *   *   K 4.3      CO2 20*   BUN 29*   CREATININE 1.4  "  CALCIUM 8.3*     , CMP   Recent Labs   Lab 11/05/23  0544   *   K 4.3      CO2 20*   *   BUN 29*   CREATININE 1.4   CALCIUM 8.3*   ANIONGAP 8     , CBC No results for input(s): "WBC", "HGB", "HCT", "PLT" in the last 48 hours., INR No results for input(s): "INR", "PROTIME" in the last 48 hours., Lipid Panel No results for input(s): "CHOL", "HDL", "LDLCALC", "TRIG", "CHOLHDL" in the last 48 hours., Troponin   No results for input(s): "TROPONINI" in the last 48 hours.  , All pertinent lab results from the last 24 hours have been reviewed., and   Recent Lab Results         11/06/23  1134   11/06/23  0730   11/05/23  1942   11/05/23  1652        POCT Glucose 285   184   248   184               Significant Imaging: Echocardiogram: Transthoracic echo (TTE) complete (Cupid Only):   Results for orders placed or performed during the hospital encounter of 11/03/23   Echo   Result Value Ref Range    BSA 1.99 m2    LVOT stroke volume 58.09 cm3    LVIDd 4.33 3.5 - 6.0 cm    LV Systolic Volume 32.06 mL    LV Systolic Volume Index 16.3 mL/m2    LVIDs 2.89 2.1 - 4.0 cm    LV Diastolic Volume 84.22 mL    LV Diastolic Volume Index 42.75 mL/m2    IVS 1.19 (A) 0.6 - 1.1 cm    LVOT diameter 2.00 cm    LVOT area 3.1 cm2    FS 33 28 - 44 %    Left Ventricle Relative Wall Thickness 0.74 cm    Posterior Wall 1.60 (A) 0.6 - 1.1 cm    LV mass 233.36 g    LV Mass Index 118 g/m2    MV Peak E Mike 1.42 m/s    TDI LATERAL 0.06 m/s    TDI SEPTAL 0.05 m/s    E/E' ratio 25.82 m/s    MV Peak A Mike 1.41 m/s    TR Max Mike 2.83 m/s    E/A ratio 1.01     IVRT 95.15 msec    E wave deceleration time 244.20 msec    LV SEPTAL E/E' RATIO 28.40 m/s    LV LATERAL E/E' RATIO 23.67 m/s    LVOT peak mike 0.75 m/s    Left Ventricular Outflow Tract Mean Velocity 0.55 cm/s    Left Ventricular Outflow Tract Mean Gradient 1.38 mmHg    RVDD 3.46 cm    RV S' 15.82 cm/s    TAPSE 2.01 cm    LA size 4.11 cm    Left Atrium Minor Axis 5.85 cm    Left Atrium " Major Axis 6.22 cm    RA Major Axis 5.59 cm    AV mean gradient 18 mmHg    AV peak gradient 27 mmHg    Ao peak fernando 2.59 m/s    Ao VTI 57.10 cm    LVOT peak VTI 18.50 cm    AV valve area 1.02 cm²    AV Velocity Ratio 0.29     AV index (prosthetic) 0.32     ZENON by Velocity Ratio 0.91 cm²    MV stenosis pressure 1/2 time 70.82 ms    MV valve area p 1/2 method 3.11 cm2    Triscuspid Valve Regurgitation Peak Gradient 32 mmHg    PV PEAK VELOCITY 1.24 m/s    PV peak gradient 6 mmHg    Sinus 2.75 cm    IVC diameter 1.64 cm    Mean e' 0.06 m/s    ZLVIDS -1.47     ZLVIDD -2.68     LA Volume Index 55.6 mL/m2    LA volume 109.53 cm3    LA WIDTH 5.2 cm    RA Width 3.4 cm    TV resting pulmonary artery pressure 35 mmHg    RV TB RVSP 6 mmHg    Est. RA pres 3 mmHg    Narrative      Left Ventricle: The left ventricle is normal in size. Increased wall   thickness. Moderate septal thickening. Normal wall motion. There is normal   systolic function with a visually estimated ejection fraction of 65 - 70%.   Grade I diastolic dysfunction.    Right Ventricle: Normal right ventricular cavity size. Systolic   function is normal.    Left Atrium: Left atrium is severely dilated.    Aortic Valve: There is moderate stenosis. Aortic valve area by VTI is   1.02 cm². Aortic valve peak velocity is 2.59 m/s. Mean gradient is 18   mmHg. The dimensionless index is 0.32.    Mitral Valve: There is mild regurgitation.    Tricuspid Valve: There is mild regurgitation.    Pulmonary Artery: The estimated pulmonary artery systolic pressure is   35 mmHg.    IVC/SVC: Normal venous pressure at 3 mmHg.       Assessment and Plan:     Brief HPI:     * Elevated troponin  Mildly elevated, flat trend.  Does not represent acute coronary syndrome.  Her chest pain is musculoskeletal in nature.  Recent stress test December 2022 did not show significant ischemia.  Patient states that her musculoskeletal pain is very different than prior anginal pain.  No further  ischemic workup is indicated during this hospitalization unless echo shows significant new wall motion abnormalities.    11/6:  Musculoskeletal chest pain.  No large wall motion abnormality seen on echo.  Will sign off.  Follow-up in Cardiology Clinic    Aortic stenosis  Continue to monitor    Post herpetic neuralgia        Stage 3a chronic kidney disease        Type 2 diabetes mellitus with stage 3 chronic kidney disease, with long-term current use of insulin           Hyperlipidemia        Fibromyalgia        Hypertension associated with diabetes              VTE Risk Mitigation (From admission, onward)         Ordered     enoxaparin injection 40 mg  Daily         11/03/23 2012     IP VTE HIGH RISK PATIENT  Once         11/03/23 2012     Place sequential compression device  Until discontinued         11/03/23 2012                Christos Monreal MD  Cardiology  [facility]

## 2023-11-06 NOTE — ASSESSMENT & PLAN NOTE
Mildly elevated, flat trend.  Does not represent acute coronary syndrome.  Her chest pain is musculoskeletal in nature.  Recent stress test December 2022 did not show significant ischemia.  Patient states that her musculoskeletal pain is very different than prior anginal pain.  No further ischemic workup is indicated during this hospitalization unless echo shows significant new wall motion abnormalities.

## 2023-11-06 NOTE — ASSESSMENT & PLAN NOTE
Mildly elevated, flat trend.  Does not represent acute coronary syndrome.  Her chest pain is musculoskeletal in nature.  Recent stress test December 2022 did not show significant ischemia.  Patient states that her musculoskeletal pain is very different than prior anginal pain.  No further ischemic workup is indicated during this hospitalization unless echo shows significant new wall motion abnormalities.    11/6:  Musculoskeletal chest pain.  No large wall motion abnormality seen on echo.  Will sign off.  Follow-up in Cardiology Clinic

## 2023-11-06 NOTE — PLAN OF CARE
11/06/23 1328   Final Note   Assessment Type Final Discharge Note   Anticipated Discharge Disposition Home   Hospital Resources/Appts/Education Provided Appointments scheduled and added to AVS   Post-Acute Status   Post-Acute Authorization Other   Other Status No Post-Acute Service Needs     Pts nurse Tamara notified that the pt can d/c from CM standpoint

## 2023-11-07 NOTE — DISCHARGE SUMMARY
"[facility] Medicine  Discharge Summary      Patient Name: Lorena Contreras  MRN: [id number]  [facility]: [id number]  Patient Class: OP- Observation  Admission Date: 11/3/2023  Hospital Length of Stay: 0 days  Discharge Date and Time: 11/6/2023  4:18 PM  Discharging Provider: Inga White MD  Primary Care Provider: Thang Zee MD      Primary Care Team: Networked reference to record PCT     HPI:   Ms. Contreras is a pleasant 73 yr old female with a hx of CAD, MI x 2 s/p left heart catheterization x 3, HTN, HLD, GERD, anemia, anxiety, depression, arthritis, stroke, CKD, and fibromyalgia and a past surgical hx of bilateral knee surgery and R elbow surgery who presented to the ED for N/V, SOB, and 10/10 constant "sharp, lightening bolt, stinging, heavy" left sided pain that starts under her breast and wraps around her rib cage to her back. She was seen at [place] for painful rash on 10/14/23, was found to have shingles, and was prescribed tylenol and valacyclovir. The pain she is experiencing today is in the same dermatomal pattern as the shingles rash she had previously. She also endorses having chills earlier today. She states the Gabapentin given to her today helped with her pain as well as the Abx that was started and says that Ibuprofen helped at home, but knows that because of her CKD she shouldn't take it. Moving and touching makes the pain worse. She lives with her grandson and his fiance, denies tobacco, alcohol, as well as recreational drugs. She denies fever, hearing/vision changes, cough, CP, diaphoresis, palpitations, diarrhea, urinary frequency, urgency, and dizziness.     In the ED, workup included CBC, CMP, troponin, UA, urine culture, POCT glucose, CXR. Workup revealed BUN 32, creatinine 1.5, GFR of 37, glucose of 354, ALP of 175, Troponin uptrend from 0.027 to 0.035 to 0.041. UA showed hazy, 2+ protein, 2+ glucose, 3+ occult blood, positive nitrites, 2+ leukocytes, >100 " RBCs, 34 WBC, many bacteria. UA reflexed to urine culture. Patient given Rocephin, gabapentin, zofran, and tramadol with some improvement in her pain. Patient will be placed under observation to trend troponin and treat her UTI.      Hospital Course:   72 y/o female patient admitted due to left sided chest pain which is likely secondary to combination msk and herpes zoster infection for which was completed antiviral treatment. Mild elevation of troponin in ED and trend remained essentially flat. Cardiology consult given hx of cad.  She remained with elevated bp in hospital and was started on procardia however she felt as if her throat was after taking it. Patient was stable with no sign of anaphylactic reaction. Remained with left sided chest pain underneath her breast, mildly erythematous rash which is tender to touch, treated with pain medication with improvement. Her BP improved. She was cleared by cardiology. She was deemed to have reached maximal benefit of hospitalization.        Consults:   Consults (From admission, onward)        Status Ordering Provider     Inpatient consult to Cardiology  Once        Provider:  Christos Monreal MD    Completed SABRINA LOCKHART          Neuro  Post herpetic neuralgia  Continue pain control       Psychiatric  Moderate episode of recurrent major depressive disorder  Stable     Cardiac/Vascular  * Elevated troponin  Per cards:  Mildly elevated, flat trend. Does not represent acute coronary syndrome.   No large wall motion abnormality seen on echo.         Aortic stenosis  Noted       Chronic diastolic heart failure  Stable     Hyperlipidemia  Continue statin    Hypertension associated with diabetes  meds reconciled  Fu with PCP    Renal/  Acute cystitis  Ucx with Proteus, resistant to minocycline  Treated with 3d IV rocephin      Stage 3a chronic kidney disease  Stable     Endocrine  Type 2 diabetes mellitus with stage 3 chronic kidney disease, with long-term current use of  insulin  Uncontrolled disease  Fu with PCP      Final Active Diagnoses:    Diagnosis Date Noted POA    PRINCIPAL PROBLEM:  Elevated troponin [R79.89] 11/03/2023 Yes    Aortic stenosis [I35.0] 11/06/2023 Yes    Acute cystitis [N30.00] 11/03/2023 Yes    Post herpetic neuralgia [B02.29] 11/03/2023 Yes    Chronic diastolic heart failure [I50.32] 08/10/2021 Yes     Chronic    Stage 3a chronic kidney disease [N18.31] 12/10/2019 Yes     Chronic    Type 2 diabetes mellitus with stage 3 chronic kidney disease, with long-term current use of insulin [E11.22, N18.30, Z79.4] 11/08/2016 Not Applicable     Chronic    Hyperlipidemia [E78.5] 07/30/2015 Yes     Chronic    Hypertension associated with diabetes [E11.59, I15.2] 01/24/2014 Yes     Chronic    Moderate episode of recurrent major depressive disorder [F33.1] 03/12/2013 Yes     Chronic      Problems Resolved During this Admission:       Discharged Condition: stable    Disposition: Home or Self Care    Follow Up:   Follow-up Information     Thang Zee MD. Schedule an appointment as soon as possible for a visit in 1 week(s).    Specialty: Family Medicine  Why: [facility] Follow-up needed in 1 week.  Contact information:  [facility] [place] [address] [phone]                       Patient Instructions:      Diet Cardiac     Activity as tolerated       Significant Diagnostic Studies: Labs:    Troponin   Recent Labs   Lab 11/03/23  2239 11/04/23  0303 11/04/23  0638   TROPONINI 0.038* 0.043* 0.037*     Cardiac Graphics: Echocardiogram:   Transthoracic echo (TTE) complete (Cupid Only):   Results for orders placed or performed during the hospital encounter of 11/03/23   Echo   Result Value Ref Range    BSA 1.99 m2    LVOT stroke volume 58.09 cm3    LVIDd 4.33 3.5 - 6.0 cm    LV Systolic Volume 32.06 mL    LV Systolic Volume Index 16.3 mL/m2    LVIDs 2.89 2.1 - 4.0 cm    LV Diastolic Volume 84.22 mL    LV Diastolic Volume Index 42.75  mL/m2    IVS 1.19 (A) 0.6 - 1.1 cm    LVOT diameter 2.00 cm    LVOT area 3.1 cm2    FS 33 28 - 44 %    Left Ventricle Relative Wall Thickness 0.74 cm    Posterior Wall 1.60 (A) 0.6 - 1.1 cm    LV mass 233.36 g    LV Mass Index 118 g/m2    MV Peak E Mike 1.42 m/s    TDI LATERAL 0.06 m/s    TDI SEPTAL 0.05 m/s    E/E' ratio 25.82 m/s    MV Peak A Mike 1.41 m/s    TR Max Mike 2.83 m/s    E/A ratio 1.01     IVRT 95.15 msec    E wave deceleration time 244.20 msec    LV SEPTAL E/E' RATIO 28.40 m/s    LV LATERAL E/E' RATIO 23.67 m/s    LVOT peak mike 0.75 m/s    Left Ventricular Outflow Tract Mean Velocity 0.55 cm/s    Left Ventricular Outflow Tract Mean Gradient 1.38 mmHg    RVDD 3.46 cm    RV S' 15.82 cm/s    TAPSE 2.01 cm    LA size 4.11 cm    Left Atrium Minor Axis 5.85 cm    Left Atrium Major Axis 6.22 cm    RA Major Axis 5.59 cm    AV mean gradient 18 mmHg    AV peak gradient 27 mmHg    Ao peak mike 2.59 m/s    Ao VTI 57.10 cm    LVOT peak VTI 18.50 cm    AV valve area 1.02 cm²    AV Velocity Ratio 0.29     AV index (prosthetic) 0.32     ZENON by Velocity Ratio 0.91 cm²    MV stenosis pressure 1/2 time 70.82 ms    MV valve area p 1/2 method 3.11 cm2    Triscuspid Valve Regurgitation Peak Gradient 32 mmHg    PV PEAK VELOCITY 1.24 m/s    PV peak gradient 6 mmHg    Sinus 2.75 cm    IVC diameter 1.64 cm    Mean e' 0.06 m/s    ZLVIDS -1.47     ZLVIDD -2.68     LA Volume Index 55.6 mL/m2    LA volume 109.53 cm3    LA WIDTH 5.2 cm    RA Width 3.4 cm    TV resting pulmonary artery pressure 35 mmHg    RV TB RVSP 6 mmHg    Est. RA pres 3 mmHg    Narrative      Left Ventricle: The left ventricle is normal in size. Increased wall   thickness. Moderate septal thickening. Normal wall motion. There is normal   systolic function with a visually estimated ejection fraction of 65 - 70%.   Grade I diastolic dysfunction.    Right Ventricle: Normal right ventricular cavity size. Systolic   function is normal.    Left Atrium: Left atrium is  severely dilated.    Aortic Valve: There is moderate stenosis. Aortic valve area by VTI is   1.02 cm². Aortic valve peak velocity is 2.59 m/s. Mean gradient is 18   mmHg. The dimensionless index is 0.32.    Mitral Valve: There is mild regurgitation.    Tricuspid Valve: There is mild regurgitation.    Pulmonary Artery: The estimated pulmonary artery systolic pressure is   35 mmHg.    IVC/SVC: Normal venous pressure at 3 mmHg.         Pending Diagnostic Studies:     None         Medications:  Reconciled Home Medications:      Medication List      CHANGE how you take these medications    hydrALAZINE 50 MG tablet  Commonly known as: APRESOLINE  Take 1 tablet (50 mg total) by mouth every 8 (eight) hours.  What changed: when to take this        CONTINUE taking these medications    ASCORBIC ACID (VITAMIN C) ORAL  Take by mouth once daily.     aspirin 81 MG Chew  Take 1 tablet (81 mg total) by mouth once daily.     atenoloL 50 MG tablet  Commonly known as: TENORMIN  Take 1 tablet (50 mg total) by mouth 2 (two) times daily.     atorvastatin 80 MG tablet  Commonly known as: LIPITOR  Take 1 tablet (80 mg total) by mouth every evening.     CENTRUM COMPLETE ORAL  Take by mouth.     cholecalciferol (vitamin D3) 50 mcg (2,000 unit) Cap capsule  Commonly known as: VITAMIN D3  Take 2 capsules by mouth 2 (two) times daily.     cyanocobalamin 1000 MCG tablet  Commonly known as: VITAMIN B-12  Take 100 mcg by mouth once daily.     DIGESTIVE ADVANTAGE IMMUNE ORAL  Take by mouth.     EPINEPHrine 0.3 mg/0.3 mL Atin  Commonly known as: EPIPEN  Inject 0.3 mLs (0.3 mg total) into the muscle once. for 1 dose     ESOMEPRAZOLE MAGNESIUM ORAL  Take by mouth as needed.     FLUoxetine 40 MG capsule  Take 1 capsule (40 mg total) by mouth once daily.     isosorbide mononitrate 30 MG 24 hr tablet  Commonly known as: IMDUR  Take 1 tablet (30 mg total) by mouth once daily.     LEVEMIR FLEXPEN 100 unit/mL (3 mL) Inpn pen  Generic drug: insulin  detemir U-100 (Levemir)  Inject 15 Units into the skin every evening.     magnesium oxide 400 mg (241.3 mg magnesium) tablet  Commonly known as: MAG-OX  Take 400 mg by mouth once daily.     melatonin 10 mg Cap  Take 10 mg by mouth nightly.     NovoLOG FlexPen U-100 Insulin 100 unit/mL (3 mL) Inpn pen  Generic drug: insulin aspart U-100  Inject 20 Units into the skin 3 (three) times daily with meals.     pantoprazole 40 MG tablet  Commonly known as: PROTONIX  Take 1 tablet (40 mg total) by mouth once daily.     ticagrelor 90 mg tablet  Commonly known as: BRILINTA  Take 1 tablet (90 mg total) by mouth 2 (two) times daily.     TRULICITY 3 mg/0.5 mL pen injector  Generic drug: dulaglutide  Inject 3 mg into the skin every 7 days.     vitamin E 1000 UNIT capsule  Take 1,000 Units by mouth once daily.     VOLTAREN TOP  Apply topically.        STOP taking these medications    metoprolol succinate 50 MG 24 hr tablet  Commonly known as: TOPROL-XL            Indwelling Lines/Drains at time of discharge:   Lines/Drains/Airways     None                 Time spent on the discharge of patient: 32 minutes   Patient examined at bedside on day of discharge. Exam findings stable. She was deemed safe for discharge.       Inga White MD  Department of Hospital Medicine  [facility] Surg

## 2023-11-07 NOTE — ASSESSMENT & PLAN NOTE
Per cards:  Mildly elevated, flat trend. Does not represent acute coronary syndrome.   No large wall motion abnormality seen on echo.

## 2023-11-17 ENCOUNTER — OFFICE VISIT (OUTPATIENT)
Dept: FAMILY MEDICINE | Facility: CLINIC | Age: 73
End: 2023-11-17
Payer: MEDICARE

## 2023-11-17 VITALS
WEIGHT: 178.38 LBS | DIASTOLIC BLOOD PRESSURE: 62 MMHG | HEART RATE: 78 BPM | OXYGEN SATURATION: 97 % | SYSTOLIC BLOOD PRESSURE: 100 MMHG | TEMPERATURE: 98 F | BODY MASS INDEX: 26.42 KG/M2 | HEIGHT: 69 IN

## 2023-11-17 DIAGNOSIS — N18.31 TYPE 2 DIABETES MELLITUS WITH STAGE 3A CHRONIC KIDNEY DISEASE, WITH LONG-TERM CURRENT USE OF INSULIN: Chronic | ICD-10-CM

## 2023-11-17 DIAGNOSIS — Z79.4 TYPE 2 DIABETES MELLITUS WITH STAGE 3A CHRONIC KIDNEY DISEASE, WITH LONG-TERM CURRENT USE OF INSULIN: Chronic | ICD-10-CM

## 2023-11-17 DIAGNOSIS — B02.29 NEURALGIA, POST-HERPETIC: Primary | ICD-10-CM

## 2023-11-17 DIAGNOSIS — E11.22 TYPE 2 DIABETES MELLITUS WITH STAGE 3A CHRONIC KIDNEY DISEASE, WITH LONG-TERM CURRENT USE OF INSULIN: Chronic | ICD-10-CM

## 2023-11-17 DIAGNOSIS — I15.2 HYPERTENSION ASSOCIATED WITH DIABETES: Chronic | ICD-10-CM

## 2023-11-17 DIAGNOSIS — E11.59 HYPERTENSION ASSOCIATED WITH DIABETES: Chronic | ICD-10-CM

## 2023-11-17 DIAGNOSIS — F41.9 ANXIETY: ICD-10-CM

## 2023-11-17 PROCEDURE — 3062F PR POS MACROALBUMINURIA RESULT DOCUMENTED/REVIEW: ICD-10-PCS | Mod: HCNC,CPTII,S$GLB, | Performed by: INTERNAL MEDICINE

## 2023-11-17 PROCEDURE — 3066F PR DOCUMENTATION OF TREATMENT FOR NEPHROPATHY: ICD-10-PCS | Mod: HCNC,CPTII,S$GLB, | Performed by: INTERNAL MEDICINE

## 2023-11-17 PROCEDURE — 3288F PR FALLS RISK ASSESSMENT DOCUMENTED: ICD-10-PCS | Mod: HCNC,CPTII,S$GLB, | Performed by: INTERNAL MEDICINE

## 2023-11-17 PROCEDURE — 1101F PR PT FALLS ASSESS DOC 0-1 FALLS W/OUT INJ PAST YR: ICD-10-PCS | Mod: HCNC,CPTII,S$GLB, | Performed by: INTERNAL MEDICINE

## 2023-11-17 PROCEDURE — 3074F SYST BP LT 130 MM HG: CPT | Mod: HCNC,CPTII,S$GLB, | Performed by: INTERNAL MEDICINE

## 2023-11-17 PROCEDURE — 3008F PR BODY MASS INDEX (BMI) DOCUMENTED: ICD-10-PCS | Mod: HCNC,CPTII,S$GLB, | Performed by: INTERNAL MEDICINE

## 2023-11-17 PROCEDURE — 4010F ACE/ARB THERAPY RXD/TAKEN: CPT | Mod: HCNC,CPTII,S$GLB, | Performed by: INTERNAL MEDICINE

## 2023-11-17 PROCEDURE — 3078F PR MOST RECENT DIASTOLIC BLOOD PRESSURE < 80 MM HG: ICD-10-PCS | Mod: HCNC,CPTII,S$GLB, | Performed by: INTERNAL MEDICINE

## 2023-11-17 PROCEDURE — 1159F MED LIST DOCD IN RCRD: CPT | Mod: HCNC,CPTII,S$GLB, | Performed by: INTERNAL MEDICINE

## 2023-11-17 PROCEDURE — 99999 PR PBB SHADOW E&M-EST. PATIENT-LVL III: ICD-10-PCS | Mod: PBBFAC,HCNC,, | Performed by: INTERNAL MEDICINE

## 2023-11-17 PROCEDURE — 3074F PR MOST RECENT SYSTOLIC BLOOD PRESSURE < 130 MM HG: ICD-10-PCS | Mod: HCNC,CPTII,S$GLB, | Performed by: INTERNAL MEDICINE

## 2023-11-17 PROCEDURE — 3008F BODY MASS INDEX DOCD: CPT | Mod: HCNC,CPTII,S$GLB, | Performed by: INTERNAL MEDICINE

## 2023-11-17 PROCEDURE — 3078F DIAST BP <80 MM HG: CPT | Mod: HCNC,CPTII,S$GLB, | Performed by: INTERNAL MEDICINE

## 2023-11-17 PROCEDURE — 99214 OFFICE O/P EST MOD 30 MIN: CPT | Mod: HCNC,S$GLB,, | Performed by: INTERNAL MEDICINE

## 2023-11-17 PROCEDURE — 1101F PT FALLS ASSESS-DOCD LE1/YR: CPT | Mod: HCNC,CPTII,S$GLB, | Performed by: INTERNAL MEDICINE

## 2023-11-17 PROCEDURE — 99214 PR OFFICE/OUTPT VISIT, EST, LEVL IV, 30-39 MIN: ICD-10-PCS | Mod: HCNC,S$GLB,, | Performed by: INTERNAL MEDICINE

## 2023-11-17 PROCEDURE — 1159F PR MEDICATION LIST DOCUMENTED IN MEDICAL RECORD: ICD-10-PCS | Mod: HCNC,CPTII,S$GLB, | Performed by: INTERNAL MEDICINE

## 2023-11-17 PROCEDURE — 3066F NEPHROPATHY DOC TX: CPT | Mod: HCNC,CPTII,S$GLB, | Performed by: INTERNAL MEDICINE

## 2023-11-17 PROCEDURE — 3288F FALL RISK ASSESSMENT DOCD: CPT | Mod: HCNC,CPTII,S$GLB, | Performed by: INTERNAL MEDICINE

## 2023-11-17 PROCEDURE — 3052F PR MOST RECENT HEMOGLOBIN A1C LEVEL 8.0 - < 9.0%: ICD-10-PCS | Mod: HCNC,CPTII,S$GLB, | Performed by: INTERNAL MEDICINE

## 2023-11-17 PROCEDURE — 3062F POS MACROALBUMINURIA REV: CPT | Mod: HCNC,CPTII,S$GLB, | Performed by: INTERNAL MEDICINE

## 2023-11-17 PROCEDURE — 3052F HG A1C>EQUAL 8.0%<EQUAL 9.0%: CPT | Mod: HCNC,CPTII,S$GLB, | Performed by: INTERNAL MEDICINE

## 2023-11-17 PROCEDURE — 4010F PR ACE/ARB THEARPY RXD/TAKEN: ICD-10-PCS | Mod: HCNC,CPTII,S$GLB, | Performed by: INTERNAL MEDICINE

## 2023-11-17 PROCEDURE — 99999 PR PBB SHADOW E&M-EST. PATIENT-LVL III: CPT | Mod: PBBFAC,HCNC,, | Performed by: INTERNAL MEDICINE

## 2023-11-17 RX ORDER — LORAZEPAM 1 MG/1
1 TABLET ORAL 2 TIMES DAILY PRN
Qty: 30 TABLET | Refills: 0 | Status: SHIPPED | OUTPATIENT
Start: 2023-11-17 | End: 2023-11-20 | Stop reason: SDUPTHER

## 2023-11-17 RX ORDER — GABAPENTIN 300 MG/1
300 CAPSULE ORAL 3 TIMES DAILY
Qty: 90 CAPSULE | Refills: 1 | Status: SHIPPED | OUTPATIENT
Start: 2023-11-17 | End: 2023-11-20 | Stop reason: SDUPTHER

## 2023-11-17 NOTE — PROGRESS NOTES
Subjective:     Chief Complaint   Patient presents with    Hospital Follow Up       HPI  Lorena Contreras is a 73 y.o. female with medical diagnoses as listed in the medical history and problem list that presents for above complaint(s).    Recent hospitalization for shingles 11/3 to 11/4 - left sided/flank   C/b by [facility] UTI, troponin elevation  On gabapentin I the hosptial only -- pain has persisted since discharge    She is fatigued - also mostly experiencing anxiety and 'jittery' symptoms  She has taken lorazepam in the past for acute anxiety     R forearm - several bruises notably with a raised     Patient Care Team:  Jorge Paris MD as PCP - General (Internal Medicine)  Javier Reilly OD as Consulting Physician (Optometry)  Aiden Zee OD as Consulting Physician (Ophthalmology)  Mary Bojorquez III, MD as Consulting Physician (Orthopedic Surgery)  [facility] - (DME Provider)  Haritha Barbour MA as Care Coordinator  Kimberly Mckoy as ED Navigator      PAST MEDICAL HISTORY:  Past Medical History:   Diagnosis Date    Allergy     Altered mental status 06/19/2022    DYSARTHRIA, SPASTIC MOVEMENTS & DIFFICULTY SWALLOWING    Anemia     Anxiety     Arthritis     Cataract     both removed    Colon polyps     Coronary artery disease     Depression     Diabetes mellitus, type II     Disorder of kidney and ureter     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/2023    Fibromyalgia     Follicular lymphoma     GERD (gastroesophageal reflux disease)     HTN (hypertension)     Hyperlipidemia     MI (myocardial infarction) 03/2019    Personal history of colonic polyps     Restless leg syndrome     Stroke        PAST SURGICAL HISTORY:  Past Surgical History:   Procedure Laterality Date    COLONOSCOPY  11/07/2012    Colon polyp found; repeat in 5 years    ELBOW SURGERY Right 2015    dislocation repair     ESOPHAGOGASTRODUODENOSCOPY  11/07/2012    atrophic gastritis, H pylori testing  negative    INCISION AND DRAINAGE FOOT Right 6/2/2021    Procedure: INCISION AND DRAINAGE, FOOT, bone biopsy;  Surgeon: Quiana Penn DPM;  Location: [facility] OR;  Service: Podiatry;  Laterality: Right;    KNEE SURGERY Bilateral 2015    scoped    LEFT HEART CATHETERIZATION Left 3/29/2019    Procedure: Left heart cath;  Surgeon: Bladimir Barbosa MD;  Location: [facility] CATH LAB;  Service: Cardiology;  Laterality: Left;    LEFT HEART CATHETERIZATION Left 11/18/2019    Procedure: Left heart cath;  Surgeon: Karl Rico MD;  Location: [facility] CATH LAB;  Service: Cardiology;  Laterality: Left;    LEFT HEART CATHETERIZATION Left 1/8/2020    Procedure: Left heart cath, right radial, noon start;  Surgeon: Christos Monreal MD;  Location: [facility] CATH LAB;  Service: Cardiology;  Laterality: Left;  RN Pre Op 1-6-20.  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  2 dtr.  Lives with GS.  3 cats and a dog.  Retired.  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  Lives in house, 1 story and 4 steps up and has a ramp.      Enjoys crafting.  Unable to bowl due to myalgias.       Social Determinants of Health     Financial Resource Strain: Low Risk  (7/21/2021)    Overall Financial Resource Strain (CARDIA)     Difficulty of Paying Living Expenses: Not hard at all   Food Insecurity: No Food Insecurity (7/21/2021)    Hunger  Vital Sign     Worried About Running Out of Food in the Last Year: Never true     Ran Out of Food in the Last Year: Never true   Transportation Needs: Unmet Transportation Needs (7/21/2021)    PRAPARE - Transportation     Lack of Transportation (Medical): Yes     Lack of Transportation (Non-Medical): Yes   Physical Activity: Inactive (7/21/2021)    Exercise Vital Sign     Days of Exercise per Week: 0 days     Minutes of Exercise per Session: 0 min   Stress: Stress Concern Present (7/21/2021)    [employer] - Occupational Stress Questionnaire     Feeling of Stress : Very much   Social Connections: Socially Isolated (7/21/2021)    Social Connection and Isolation Panel [NHANES]     Frequency of Communication with Friends and Family: More than three times a week     Frequency of Social Gatherings with Friends and Family: More than three times a week     Attends [employer] Services: Never     Active Member of Clubs or Organizations: No     Attends Club or Organization Meetings: Never     Marital Status:        FAMILY HISTORY:  Family History   Problem Relation Age of Onset    Cancer Mother         colon    Heart disease Mother     Cancer Father         lung    Lung cancer Brother     Diabetes Sister     Hypertension Sister     Allergy (severe) Daughter     No Known Problems Daughter     Stroke Neg Hx     Hyperlipidemia Neg Hx        ALLERGIES AND MEDICATIONS: updated and reviewed.  Review of patient's allergies indicates:   Allergen Reactions    Novolin 70/30 (semi-synthetic) Nausea And Vomiting     Severe vomiting on Relion 70/30    Sulfa (sulfonamide antibiotics) Anaphylaxis    Talwin [pentazocine lactate] Anaphylaxis    Victoza [liraglutide] Nausea And Vomiting    Glipizide Nausea Only    Citric acid     Codeine     Influenza virus vaccines Hives    Iodine and iodide containing products Hives    Levetiracetam Itching    Rituxan [rituximab] Hives    Zoloft [sertraline] Nausea And  Vomiting     Current Outpatient Medications   Medication Sig Dispense Refill    ASCORBIC ACID, VITAMIN C, ORAL Take by mouth once daily.      atenoloL (TENORMIN) 50 MG tablet Take 1 tablet (50 mg total) by mouth 2 (two) times daily. 180 tablet 3    atorvastatin (LIPITOR) 80 MG tablet Take 1 tablet (80 mg total) by mouth every evening. 90 tablet 3    Bacillus coagulans (DIGESTIVE ADVANTAGE IMMUNE ORAL) Take by mouth.      cholecalciferol, vitamin D3, (VITAMIN D3) 50 mcg (2,000 unit) Cap Take 2 capsules by mouth 2 (two) times daily.      cyanocobalamin (VITAMIN B-12) 1000 MCG tablet Take 100 mcg by mouth once daily.      diclofenac sodium (VOLTAREN TOP) Apply topically.      dulaglutide (TRULICITY) 3 mg/0.5 mL pen injector Inject 3 mg into the skin every 7 days. 4 pen 11    ESOMEPRAZOLE MAGNESIUM ORAL Take by mouth as needed.      FLUoxetine 40 MG capsule Take 1 capsule (40 mg total) by mouth once daily. 90 capsule 3    insulin detemir U-100, Levemir, (LEVEMIR FLEXPEN) 100 unit/mL (3 mL) InPn pen Inject 15 Units into the skin every evening. 15 mL 7    isosorbide mononitrate (IMDUR) 30 MG 24 hr tablet Take 1 tablet (30 mg total) by mouth once daily. 90 tablet 3    magnesium oxide (MAG-OX) 400 mg tablet Take 400 mg by mouth once daily.      melatonin 10 mg Cap Take 10 mg by mouth nightly.      multivitamin/iron/folic acid (CENTRUM COMPLETE ORAL) Take by mouth.      NOVOLOG FLEXPEN U-100 INSULIN 100 unit/mL (3 mL) InPn pen Inject 20 Units into the skin 3 (three) times daily with meals. 15 mL 8    pantoprazole (PROTONIX) 40 MG tablet Take 1 tablet (40 mg total) by mouth once daily. 90 tablet 3    ticagrelor (BRILINTA) 90 mg tablet Take 1 tablet (90 mg total) by mouth 2 (two) times daily. 180 tablet 3    vitamin E 1000 UNIT capsule Take 1,000 Units by mouth once daily.      aspirin 81 MG Chew Take 1 tablet (81 mg total) by mouth once daily. 90 tablet 3    EPINEPHrine (EPIPEN) 0.3 mg/0.3 mL AtIn Inject 0.3 mLs (0.3 mg  "total) into the muscle once. for 1 dose 0.3 mL 1    gabapentin (NEURONTIN) 300 MG capsule Take 1 capsule (300 mg total) by mouth 3 (three) times daily. 90 capsule 1    hydrALAZINE (APRESOLINE) 50 MG tablet Take 1 tablet (50 mg total) by mouth every 8 (eight) hours. 180 tablet 3    LORazepam (ATIVAN) 1 MG tablet Take 1 tablet (1 mg total) by mouth 2 (two) times daily as needed for Anxiety. 30 tablet 0     No current facility-administered medications for this visit.         Objective:       Physical Exam  Vitals:    11/17/23 0914   BP: 100/62   Pulse: 78   Temp: 98.2 °F (36.8 °C)   TempSrc: Oral   SpO2: 97%   Weight: 80.9 kg (178 lb 5.6 oz)   Height: 5' 9" (1.753 m)    Body mass index is 26.34 kg/m².  Weight: 80.9 kg (178 lb 5.6 oz)   Height: 5' 9" (175.3 cm)   Physical Exam  Vitals reviewed.   Constitutional:       General: She is not in acute distress.     Appearance: Normal appearance. She is well-developed. She is not ill-appearing.   HENT:      Head: Normocephalic and atraumatic.   Eyes:      Conjunctiva/sclera: Conjunctivae normal.      Pupils: Pupils are equal, round, and reactive to light.   Neck:      Thyroid: No thyromegaly.   Cardiovascular:      Rate and Rhythm: Normal rate.      Heart sounds: Normal heart sounds. No murmur heard.  Pulmonary:      Effort: Pulmonary effort is normal. No respiratory distress.      Breath sounds: Normal breath sounds.   Musculoskeletal:         General: No deformity.      Cervical back: Normal range of motion and neck supple.   Lymphadenopathy:      Cervical: No cervical adenopathy.   Skin:     General: Skin is warm and dry.      Comments: Hyperpigmented macules/patches of left flank   Neurological:      Mental Status: She is alert.      Cranial Nerves: No cranial nerve deficit.   Psychiatric:         Behavior: Behavior normal.             Assessment:     1. Neuralgia, post-herpetic    2. Anxiety    3. Type 2 diabetes mellitus with stage 3a chronic kidney disease, with " long-term current use of insulin    4. Hypertension associated with diabetes      Plan:     Lorena was seen today for hospital follow up.    Diagnoses and all orders for this visit:    Neuralgia, post-herpetic  Sent medication (s) to patient's preferred pharmacy on file.  -     : gabapentin (NEURONTIN) 300 MG capsule; Take 1 capsule (300 mg total) by mouth 3 (three) times daily.    Anxiety  Limited benzodiazepine usage recommended for acute anxiety syndrome  -     : LORazepam (ATIVAN) 1 MG tablet; Take 1 tablet (1 mg total) by mouth 2 (two) times daily as needed for Anxiety.    Type 2 diabetes mellitus with stage 3a chronic kidney disease, with long-term current use of insulin  Hypertension associated with diabetes  Diabetes uncontrolled A1c 8.9% - improving gradually  Following with Endocrinology - discussed briefly    Hypertension  BP goal <130/80 per ADA guidelines  BP controlled presently - reviewed anti-hypertensive regimen - continue current therapy      Health Maintenance reviewed, addressed as per orders    F/u in 4-6 months      1. The patient indicates understanding of these issues and agrees with the plan. Brief care plan is updated and reviewed with the patient as applicable.   2. The patient is given an After Visit Summary that lists all medications with directions, allergies, orders placed during this encounter and follow-up instructions.   3. I have reviewed the patient's medical information including past medical, family, and social history sections including the medications and allergies.   4. We discussed the patient's current medications. I reconciled the patient's medication list and prepared and supplied needed refills.       Jorge Paris MD  Internal Medicine-Pediatrics

## 2023-11-20 DIAGNOSIS — F41.9 ANXIETY: ICD-10-CM

## 2023-11-20 DIAGNOSIS — B02.29 NEURALGIA, POST-HERPETIC: ICD-10-CM

## 2023-11-20 RX ORDER — GABAPENTIN 300 MG/1
300 CAPSULE ORAL 3 TIMES DAILY
Qty: 90 CAPSULE | Refills: 1 | Status: SHIPPED | OUTPATIENT
Start: 2023-11-20 | End: 2024-11-19

## 2023-11-20 RX ORDER — LORAZEPAM 1 MG/1
1 TABLET ORAL 2 TIMES DAILY PRN
Qty: 30 TABLET | Refills: 0 | Status: SHIPPED | OUTPATIENT
Start: 2023-11-20 | End: 2023-12-20

## 2023-11-20 NOTE — TELEPHONE ENCOUNTER
No care due was identified.  [facility] Embedded Care Due Messages. Reference number: [id number].   11/20/2023 1:05:16 PM CST

## 2023-11-20 NOTE — TELEPHONE ENCOUNTER
----- Message from Meg Woodard sent at 11/20/2023 12:11 PM CST -----  Regarding: Refill request  .Type: RX Refill Request    Who Called:self     Have you contacted your pharmacy:yes     Refill or New Rx: Refill    RX Name and Strength:gabapentin (NEURONTIN) 300 MG capsule and LORazepam (ATIVAN) 1 MG tablet    Preferred Pharmacy with phone number:.  [facility]  [place] - [address]  [address]  [place] [address]  Phone: [phone] Fax: [phone]    Local or Mail Order:local     Ordering Provider:JOSÉ MIGUEL Patel     Would the patient rather a call back or a response via [facility]? Call     Best Call Back Number:.[phone]      Additional Information: caller states pharmacy does not have medication for her

## 2023-11-29 ENCOUNTER — LAB VISIT (OUTPATIENT)
Dept: LAB | Facility: HOSPITAL | Age: 73
End: 2023-11-29
Payer: MEDICARE

## 2023-11-29 DIAGNOSIS — Z79.4 UNCONTROLLED TYPE 2 DIABETES MELLITUS WITH HYPERGLYCEMIA, WITH LONG-TERM CURRENT USE OF INSULIN: ICD-10-CM

## 2023-11-29 DIAGNOSIS — E11.65 UNCONTROLLED TYPE 2 DIABETES MELLITUS WITH HYPERGLYCEMIA, WITH LONG-TERM CURRENT USE OF INSULIN: ICD-10-CM

## 2023-11-29 LAB
ALBUMIN SERPL BCP-MCNC: 2.9 G/DL (ref 3.5–5.2)
ANION GAP SERPL CALC-SCNC: 11 MMOL/L (ref 8–16)
BUN SERPL-MCNC: 40 MG/DL (ref 8–23)
C PEPTIDE SERPL-MCNC: 3.89 NG/ML (ref 0.78–5.19)
CALCIUM SERPL-MCNC: 9.1 MG/DL (ref 8.7–10.5)
CHLORIDE SERPL-SCNC: 105 MMOL/L (ref 95–110)
CO2 SERPL-SCNC: 21 MMOL/L (ref 23–29)
CREAT SERPL-MCNC: 1.4 MG/DL (ref 0.5–1.4)
EST. GFR  (NO RACE VARIABLE): 39.7 ML/MIN/1.73 M^2
ESTIMATED AVG GLUCOSE: 220 MG/DL (ref 68–131)
GLUCOSE SERPL-MCNC: 277 MG/DL (ref 70–110)
HBA1C MFR BLD: 9.3 % (ref 4–5.6)
PHOSPHATE SERPL-MCNC: 4.1 MG/DL (ref 2.7–4.5)
POTASSIUM SERPL-SCNC: 5.1 MMOL/L (ref 3.5–5.1)
SODIUM SERPL-SCNC: 137 MMOL/L (ref 136–145)

## 2023-11-29 PROCEDURE — 36415 COLL VENOUS BLD VENIPUNCTURE: CPT | Mod: HCNC,PO | Performed by: HOSPITALIST

## 2023-11-29 PROCEDURE — 84681 ASSAY OF C-PEPTIDE: CPT | Mod: HCNC | Performed by: HOSPITALIST

## 2023-11-29 PROCEDURE — 80069 RENAL FUNCTION PANEL: CPT | Mod: HCNC | Performed by: HOSPITALIST

## 2023-11-29 PROCEDURE — 83036 HEMOGLOBIN GLYCOSYLATED A1C: CPT | Mod: HCNC | Performed by: HOSPITALIST

## 2023-12-06 ENCOUNTER — OFFICE VISIT (OUTPATIENT)
Dept: ENDOCRINOLOGY | Facility: CLINIC | Age: 73
End: 2023-12-06
Payer: MEDICARE

## 2023-12-06 VITALS
TEMPERATURE: 98 F | HEART RATE: 70 BPM | BODY MASS INDEX: 27.56 KG/M2 | DIASTOLIC BLOOD PRESSURE: 76 MMHG | WEIGHT: 186.63 LBS | SYSTOLIC BLOOD PRESSURE: 153 MMHG

## 2023-12-06 DIAGNOSIS — I15.2 HYPERTENSION ASSOCIATED WITH DIABETES: Chronic | ICD-10-CM

## 2023-12-06 DIAGNOSIS — E11.59 HYPERTENSION ASSOCIATED WITH DIABETES: Chronic | ICD-10-CM

## 2023-12-06 DIAGNOSIS — E11.65 TYPE 2 DIABETES MELLITUS WITH HYPERGLYCEMIA, WITH LONG-TERM CURRENT USE OF INSULIN: Primary | ICD-10-CM

## 2023-12-06 DIAGNOSIS — E11.3293 MILD NONPROLIFERATIVE DIABETIC RETINOPATHY OF BOTH EYES ASSOCIATED WITH TYPE 2 DIABETES MELLITUS, MACULAR EDEMA PRESENCE UNSPECIFIED: ICD-10-CM

## 2023-12-06 DIAGNOSIS — N18.31 STAGE 3A CHRONIC KIDNEY DISEASE: Chronic | ICD-10-CM

## 2023-12-06 DIAGNOSIS — Z79.4 UNCONTROLLED TYPE 2 DIABETES MELLITUS WITH HYPERGLYCEMIA, WITH LONG-TERM CURRENT USE OF INSULIN: ICD-10-CM

## 2023-12-06 DIAGNOSIS — I25.2 HISTORY OF MYOCARDIAL INFARCTION: ICD-10-CM

## 2023-12-06 DIAGNOSIS — E11.65 UNCONTROLLED TYPE 2 DIABETES MELLITUS WITH HYPERGLYCEMIA, WITH LONG-TERM CURRENT USE OF INSULIN: ICD-10-CM

## 2023-12-06 DIAGNOSIS — Z79.4 TYPE 2 DIABETES MELLITUS WITH HYPERGLYCEMIA, WITH LONG-TERM CURRENT USE OF INSULIN: Primary | ICD-10-CM

## 2023-12-06 PROCEDURE — 3078F DIAST BP <80 MM HG: CPT | Mod: HCNC,CPTII,S$GLB, | Performed by: HOSPITALIST

## 2023-12-06 PROCEDURE — 1159F PR MEDICATION LIST DOCUMENTED IN MEDICAL RECORD: ICD-10-PCS | Mod: HCNC,CPTII,S$GLB, | Performed by: HOSPITALIST

## 2023-12-06 PROCEDURE — 99999 PR PBB SHADOW E&M-EST. PATIENT-LVL V: ICD-10-PCS | Mod: PBBFAC,HCNC,, | Performed by: HOSPITALIST

## 2023-12-06 PROCEDURE — 1160F RVW MEDS BY RX/DR IN RCRD: CPT | Mod: HCNC,CPTII,S$GLB, | Performed by: HOSPITALIST

## 2023-12-06 PROCEDURE — 99214 OFFICE O/P EST MOD 30 MIN: CPT | Mod: HCNC,S$GLB,, | Performed by: HOSPITALIST

## 2023-12-06 PROCEDURE — 3046F HEMOGLOBIN A1C LEVEL >9.0%: CPT | Mod: HCNC,CPTII,S$GLB, | Performed by: HOSPITALIST

## 2023-12-06 PROCEDURE — 1160F PR REVIEW ALL MEDS BY PRESCRIBER/CLIN PHARMACIST DOCUMENTED: ICD-10-PCS | Mod: HCNC,CPTII,S$GLB, | Performed by: HOSPITALIST

## 2023-12-06 PROCEDURE — 4010F PR ACE/ARB THEARPY RXD/TAKEN: ICD-10-PCS | Mod: HCNC,CPTII,S$GLB, | Performed by: HOSPITALIST

## 2023-12-06 PROCEDURE — 3288F PR FALLS RISK ASSESSMENT DOCUMENTED: ICD-10-PCS | Mod: HCNC,CPTII,S$GLB, | Performed by: HOSPITALIST

## 2023-12-06 PROCEDURE — 3008F PR BODY MASS INDEX (BMI) DOCUMENTED: ICD-10-PCS | Mod: HCNC,CPTII,S$GLB, | Performed by: HOSPITALIST

## 2023-12-06 PROCEDURE — 3062F POS MACROALBUMINURIA REV: CPT | Mod: HCNC,CPTII,S$GLB, | Performed by: HOSPITALIST

## 2023-12-06 PROCEDURE — 3288F FALL RISK ASSESSMENT DOCD: CPT | Mod: HCNC,CPTII,S$GLB, | Performed by: HOSPITALIST

## 2023-12-06 PROCEDURE — 1159F MED LIST DOCD IN RCRD: CPT | Mod: HCNC,CPTII,S$GLB, | Performed by: HOSPITALIST

## 2023-12-06 PROCEDURE — 3066F NEPHROPATHY DOC TX: CPT | Mod: HCNC,CPTII,S$GLB, | Performed by: HOSPITALIST

## 2023-12-06 PROCEDURE — 3078F PR MOST RECENT DIASTOLIC BLOOD PRESSURE < 80 MM HG: ICD-10-PCS | Mod: HCNC,CPTII,S$GLB, | Performed by: HOSPITALIST

## 2023-12-06 PROCEDURE — 3077F SYST BP >= 140 MM HG: CPT | Mod: HCNC,CPTII,S$GLB, | Performed by: HOSPITALIST

## 2023-12-06 PROCEDURE — 99214 PR OFFICE/OUTPT VISIT, EST, LEVL IV, 30-39 MIN: ICD-10-PCS | Mod: HCNC,S$GLB,, | Performed by: HOSPITALIST

## 2023-12-06 PROCEDURE — 1101F PR PT FALLS ASSESS DOC 0-1 FALLS W/OUT INJ PAST YR: ICD-10-PCS | Mod: HCNC,CPTII,S$GLB, | Performed by: HOSPITALIST

## 2023-12-06 PROCEDURE — 4010F ACE/ARB THERAPY RXD/TAKEN: CPT | Mod: HCNC,CPTII,S$GLB, | Performed by: HOSPITALIST

## 2023-12-06 PROCEDURE — 3066F PR DOCUMENTATION OF TREATMENT FOR NEPHROPATHY: ICD-10-PCS | Mod: HCNC,CPTII,S$GLB, | Performed by: HOSPITALIST

## 2023-12-06 PROCEDURE — 1101F PT FALLS ASSESS-DOCD LE1/YR: CPT | Mod: HCNC,CPTII,S$GLB, | Performed by: HOSPITALIST

## 2023-12-06 PROCEDURE — 3077F PR MOST RECENT SYSTOLIC BLOOD PRESSURE >= 140 MM HG: ICD-10-PCS | Mod: HCNC,CPTII,S$GLB, | Performed by: HOSPITALIST

## 2023-12-06 PROCEDURE — 99999 PR PBB SHADOW E&M-EST. PATIENT-LVL V: CPT | Mod: PBBFAC,HCNC,, | Performed by: HOSPITALIST

## 2023-12-06 PROCEDURE — 3008F BODY MASS INDEX DOCD: CPT | Mod: HCNC,CPTII,S$GLB, | Performed by: HOSPITALIST

## 2023-12-06 PROCEDURE — 3046F PR MOST RECENT HEMOGLOBIN A1C LEVEL > 9.0%: ICD-10-PCS | Mod: HCNC,CPTII,S$GLB, | Performed by: HOSPITALIST

## 2023-12-06 PROCEDURE — 3062F PR POS MACROALBUMINURIA RESULT DOCUMENTED/REVIEW: ICD-10-PCS | Mod: HCNC,CPTII,S$GLB, | Performed by: HOSPITALIST

## 2023-12-06 RX ORDER — BLOOD-GLUCOSE SENSOR
EACH MISCELLANEOUS
Qty: 3 EACH | Refills: 11 | Status: SHIPPED | OUTPATIENT
Start: 2023-12-06

## 2023-12-06 RX ORDER — BLOOD-GLUCOSE,RECEIVER,CONT
EACH MISCELLANEOUS
Qty: 1 EACH | Refills: 0 | Status: SHIPPED | OUTPATIENT
Start: 2023-12-06

## 2023-12-06 NOTE — PATIENT INSTRUCTIONS
Levemir 15 units nightly at 8 PM    Novolog: 10 units for breakfast, 20 units for lunch,  20 unit for dinner    Trulicity 3.0 mg once a week

## 2023-12-06 NOTE — PROGRESS NOTES
"Subjective:      Patient ID: Lorena Contreras is a 73 y.o. female presented to [facility] on 12/6/2023.  Chief Complaint:  Diabetes    History of Present Illness: Lorena Contreras is a 73 y.o. female here for   type 2 diabetes  Other significant past medical history: CAD s/p MI 3/2019 s/p stent placement,  hypertension    Diabetes mellitus Type 2  - Known diabetic complications: retinopathy, peripheral neuropathy, cardiovascular disease, and cerebrovascular disease  - Diagnosed w/ DM: in >20 years  - Diabetes Education: Yes, many years ago, at \[facility]\""  - Family history of diabetes: Yes  - Hx of pancreatitis/Diabetes Related Hospitalization:  No  - Patient  quite concerned about medication induced nausea and vomiting.  She attributes her symptoms to a lot of diabetes medication in the past  - Pt afraid to take insulin because she fears hypoglycemia.  Patient with symptoms of hypoglycemia less than 100  - 1st time seen by me 8/24/2023: Patient with chronic poorly-controlled type 2 diabetes, recent A1c 13%, previous 14%.  Previously seen by  Concepcion Zee NP for diabetes management.  Last office visit  10/2022, with patient not showing up to follow-up appointment. Patient was not happy at her last office visit due to drastic changes to her regimen  leading to to chronic nausea and vomiting once again. She attributes the as Tresiba the cause of her nausea  .  Patient on Trulicity Finds that her BGs have improved with Trulicity, down from 400-500s to now 200s. But today, BG erick to 400s after eating yogurt and banana; no insulin yet today. . Currently not on Dexcom G6 due to issues, patient inadvertently  threw out her transmitter  - overall patient is a very poor historian. Reports that she has a bad memory. When  discussing about nutrition and Diabetes Education," I know all about it"  - having issue getting Dexcom from [facility]      Interval history:  patient is here for follow-up type 2 " "diabetes.  A1c  Decrease 9.3%.  Arrives with sister Milton.   Compliant with NovoLog 3 times a day with meals.  Levemir at night and Trulicity.  Currently does not have any problem with nausea.  Does have recent   Shingles episode, got gabapentin from PCP for neuropathy  When talking about diet: does consume "grits"  frequent  Did not bring glucose log for review.  Reports glucose to be in the 200s.  Does have some lower glucose at night.  Snacking prior going to bed    Current reported meds:    Novolo units for breakfast, 15 units for lunch 20 unit for dinner  Levemir 15 units nightly at 8 PM  Trulicity 3.0 mg once a week, unable to tolerate larger doses    Compliant with insulin.  Rotating injection sites  Previous meds tried:              Metformin 1000 mg bid  Novolog 70/30 30 units BID ac   Glipizide dc'd d/t recurrent hypoglycemia; also had nausea   Victoza - severe nausea and abdominal pain   Started mixed analog insulin 2017  Home glucose checks: checks 3x a day, Logs reviewed/Unavailable: oral recall: 300  - Hypoglycemia symptoms:  when her glucose decreased to low 100s  Diet/Exercise:   - CURRENT DIET: Doesn't generally eat breakfast. In the mornings, she usually just drinks coffee decaf.   Eats 2 meals/day, lunch 4 pm and dinner at 9 pm. First food intake is at 4 pm, no snacks until after dinner. Likes to eat 1/2 nutty butty after dinner.   Drinks unsweet tea, green tea without sugar.   - Weight trend: stable  - Occupation: not working  - Eye exam current (within one year): yes, DR:  yes  - Reports cuts or ulcers on feet:   Denies, has podiatrist  - Statin: Taking, ACE/ARB: Not taking    Diabetes lab work  Lab Results   Component Value Date    HGBA1C 9.3 (H) 2023    HGBA1C 8.9 (H) 2023    HGBA1C 13.9 (H) 04/10/2023    HGBA1C >14.0 (H) 2022     Chart review show longstanding history of poorly-controlled type 2 diabetes, with A1c above 8% for many years.  Recently A1C 13, 14      Lab " "Results   Component Value Date    CPEPTIDE 3.89 11/29/2023      No results found for: "FRUCTOSAMINE"  Lab Results   Component Value Date    MICALBCREAT 921.7 (H) 06/22/2023     Lab Results   Component Value Date    JDAQZODH09 1161 (H) 05/26/2022     Diabetes Management Status: Reviewed this office visit  Screening or Prevention Patient's value Goal Complete/Controlled?   Lipid profile : 04/10/2023 Annually Yes   Dilated retinal exam : 03/12/2021 Annually Yes   Foot exam   : 10/20/2022 Annually Yes      Reviewed past surgical, medical, family, social history and updated as appropriate.  Review of Systems: see HPI above  Objective:   BP (!) 153/76   Pulse 70   Temp 98 °F (36.7 °C) (Oral)   Wt 84.6 kg (186 lb 9.6 oz)   BMI 27.56 kg/m²   Body mass index is 27.56 kg/m².  Vital signs reviewed    Physical Exam  Vitals and nursing note reviewed.   Constitutional:       Appearance: Normal appearance. She is well-developed. She is obese. She is not ill-appearing.   Neck:      Thyroid: No thyromegaly.   Pulmonary:      Effort: Pulmonary effort is normal. No respiratory distress.   Musculoskeletal:         General: Normal range of motion.      Cervical back: Normal range of motion.   Neurological:      General: No focal deficit present.      Mental Status: She is alert. Mental status is at baseline.   Psychiatric:         Mood and Affect: Mood normal.         Behavior: Behavior normal.     Lab Reviewed:  See results in subjective  Lab Results   Component Value Date    HGBA1C 9.3 (H) 11/29/2023     Lab Results   Component Value Date    CHOL 108 (L) 04/10/2023    HDL 34 (L) 04/10/2023    LDLCALC 48.0 (L) 04/10/2023    TRIG 130 04/10/2023    CHOLHDL 31.5 04/10/2023     Lab Results   Component Value Date     11/29/2023    K 5.1 11/29/2023     11/29/2023    CO2 21 (L) 11/29/2023     (H) 11/29/2023    BUN 40 (H) 11/29/2023    CREATININE 1.4 11/29/2023    CALCIUM 9.1 11/29/2023    PHOS 4.1 11/29/2023    PROT 7.2 " 11/03/2023    ALBUMIN 2.9 (L) 11/29/2023    BILITOT 0.4 11/03/2023    ALKPHOS 175 (H) 11/03/2023    AST 41 (H) 11/03/2023    ALT 53 (H) 11/03/2023    ANIONGAP 11 11/29/2023    ESTGFRAFRICA 53 (A) 06/21/2022    EGFRNONAA 46 (A) 06/21/2022    TSH 0.602 04/10/2023    PTH 66.5 10/22/2021    ABHVCZQH74YA 87 08/23/2016     Assessment     1. Type 2 diabetes mellitus with hyperglycemia, with long-term current use of insulin  DEXCOM G7 SENSOR Samina    DEXCOM G7  Misc    HEMOGLOBIN A1C    RENAL FUNCTION PANEL      2. Uncontrolled type 2 diabetes mellitus with hyperglycemia, with long-term current use of insulin        3. Stage 3a chronic kidney disease        4. Hypertension associated with diabetes        5. Mild nonproliferative diabetic retinopathy of both eyes associated with type 2 diabetes mellitus, macular edema presence unspecified          Plan     Uncontrolled type 2 diabetes mellitus with hyperglycemia, with long-term current use of insulin  - Diabetes is not at goal, given most current A1C, Goal A1C for patient is 7%  - Complicated by hyperglycemia, Longstanding history of noncompliance to insulin regimen, poor insight into diabetes care  - Patient with fear of hypoglycemia, therefore leaving her glucose high  - Patient reports chronic nausea and vomiting  For which she attributed due to her diabetes medications, this has limited treatment option and compliance in the past.  Of note currently does not have any issues with her current regimen 12/6/2023  - Diabetes goal including glucose glucose and A1C goals discussed  - Discussed proper insulin injection technique: Including rotation of injection sites, using new insulin needles  - Diabetic supplies/medications were reviewed this visit to ensure continue steady supplies  - Patient does report history of hypoglycemia with glucose in the 50-60s symptomatic    Plan  - Continue encouragement of insulin compliance.  Currently patient seems motivated to lower her  A1c.  Denies any issues with her current regimen.  - Adjustment made:  Novolog: 10 units for breakfast, 20 units for lunch,  20 unit for dinner.  Monitor for hypoglycemia postprandial especially at night  - Continue Levemir 15 units nightly at 8 PM  - PCP did increase Trulicity 3.0 mg once a week.  Confirm with patient that she is not having any nausea with this medication.  Would avoid increasing the 4.5 mg as she had issues with a past  - Encouragement of dietary modification, portion size control, decreasing carbohydrates intake  - Restart Dexcom G7 paperwork will be we sent to [facility]  - Follow up as scheduled, 3 months      Stage 3a chronic kidney disease  - Renal function reviewed on lab work today, stable   - Renally Adjust diabetes medication, avoid hypoglycemia  - continue routine monitoring      Hypertension associated with diabetes  - blood pressure review in clinic today  - manage by PCP    Mild nonproliferative diabetic retinopathy of both eyes associated with type 2 diabetes mellitus  -  continue routine  eye monitoring    Advised patient to follow up with PCP for routine health maintenance care.   RTC in 3 months    Gerardo Barbour M.D.  Endocrinology  [facility]  12/6/2023      Disclaimer: This note has been generated in part with the use of voice-recognition software. There may be typographical errors that have been missed during proof-reading.    -------------------------------------------------------------------------------------------------  Indications for CGMs:    Patent with diagnosed: Diabetes Mellitus that required frequent glucose monitoring (4 + times a day and 4x/day testing), frequent adjustments to insulin regiment based on BG or CGM results. Patent requires a therapeutic CGM and is willing to use therapeutic CGM/SMBG for Necessary frequent adjustments in insulin therapy, patient demonstrated an understanding of the technology (can use and recognize alerts and  alarms). I, the physician, have assessed adherence to CGM regimen and to the plan, patient will have close follow up in clinic as indicated, every 3 months to 6 months.     Patient experiences (including but not limited to):  [x]   Discrepancies between records and A1C, at risk severe hypoglycemia episode and hospitalization  []   Recurrent, unexplained, severe hypoglycemic episodes  [x]   Postprandial hyperglycemia  [x]   Unexplained blood glucose excursions  []   Impaired awareness of hypoglycemia    I believe that the patient will greatly benefit from wearing CGM because this will allow for better glucose control, help to avoid high/lows and prevent hospitalization.  This also is safer for patient in using CGM during the COVID public health emergency.

## 2023-12-06 NOTE — ASSESSMENT & PLAN NOTE
-  continue to benefit from the use of GLP1   ADVISED PATIENT   HE THINKS ITS WORKING BETTER BUT HE RAN OUT

## 2023-12-06 NOTE — ASSESSMENT & PLAN NOTE
- Diabetes is not at goal, given most current A1C, Goal A1C for patient is 7%  - Complicated by hyperglycemia, Longstanding history of noncompliance to insulin regimen, poor insight into diabetes care  - Patient with fear of hypoglycemia, therefore leaving her glucose high  - Patient reports chronic nausea and vomiting  For which she attributed due to her diabetes medications, this has limited treatment option and compliance in the past.  Of note currently does not have any issues with her current regimen 12/6/2023  - Diabetes goal including glucose glucose and A1C goals discussed  - Discussed proper insulin injection technique: Including rotation of injection sites, using new insulin needles  - Diabetic supplies/medications were reviewed this visit to ensure continue steady supplies  - Patient does report history of hypoglycemia with glucose in the 50-60s symptomatic    Plan  - Continue encouragement of insulin compliance.  Currently patient seems motivated to lower her A1c.  Denies any issues with her current regimen.  - Adjustment made:  Novolog: 10 units for breakfast, 20 units for lunch,  20 unit for dinner.  Monitor for hypoglycemia postprandial especially at night  - Continue Levemir 15 units nightly at 8 PM  - PCP did increase Trulicity 3.0 mg once a week.  Confirm with patient that she is not having any nausea with this medication.  Would avoid increasing the 4.5 mg as she had issues with a past  - Encouragement of dietary modification, portion size control, decreasing carbohydrates intake  - Restart Dexcom G7 paperwork will be we sent to [facility]  - Follow up as scheduled, 3 months

## 2023-12-07 NOTE — TELEPHONE ENCOUNTER
The lab called to inform a blood glucose level of over 500mg/dl.  After several attempts I was able to reach patient and reports she feels fine.  She is used to Accu-Cheks up to 600. Has been having episodes of hypoglycemia even by taking half the strength of her insulin.  I also explained kidney function looks decreased and sodium on the low side which are likely associated to high blood sugars and uncontrolled diabetes.  She informs that she is urinating well and actually a lot and does not feel her kidneys are giving trouble at this moment.  She will drink fluids but will get upset when she goes to the bathroom during the night.  Hoping holding Januvia will help.  I encouraged her to monitor her blood sugars during the night and if she starts feeling weak and confused consider to go to the emergency room.  Taking a lower strength of her usual insulin is advised and reports she will try 15 units as she did not tolerate 20 units in the past.  Advised to continue to monitor her blood sugar and consider a bedtime snack if hypoglycemia is a concern.  She will follow-up with her primary care physician.  If she does not feel well she will go to the emergency room.  
No

## 2024-01-03 ENCOUNTER — PATIENT MESSAGE (OUTPATIENT)
Dept: ADMINISTRATIVE | Facility: HOSPITAL | Age: 74
End: 2024-01-03
Payer: MEDICARE

## 2024-01-04 ENCOUNTER — PATIENT MESSAGE (OUTPATIENT)
Dept: ADMINISTRATIVE | Facility: HOSPITAL | Age: 74
End: 2024-01-04
Payer: MEDICARE

## 2024-01-11 ENCOUNTER — TELEPHONE (OUTPATIENT)
Dept: FAMILY MEDICINE | Facility: CLINIC | Age: 74
End: 2024-01-11
Payer: MEDICARE

## 2024-01-12 NOTE — TELEPHONE ENCOUNTER
Mammo outreach - updated mammo order has been placed - Please call pt to schedule mammo which is overdue

## 2024-01-12 NOTE — TELEPHONE ENCOUNTER
Called patient, no voicemail, no answer, unable to leave message. Patient needs to schedule mammogram.

## 2024-01-27 DIAGNOSIS — I25.118 CORONARY ARTERY DISEASE OF NATIVE ARTERY OF NATIVE HEART WITH STABLE ANGINA PECTORIS: ICD-10-CM

## 2024-01-27 DIAGNOSIS — Z79.4 UNCONTROLLED TYPE 2 DIABETES MELLITUS WITH HYPERGLYCEMIA, WITH LONG-TERM CURRENT USE OF INSULIN: ICD-10-CM

## 2024-01-27 DIAGNOSIS — E11.65 UNCONTROLLED TYPE 2 DIABETES MELLITUS WITH HYPERGLYCEMIA, WITH LONG-TERM CURRENT USE OF INSULIN: ICD-10-CM

## 2024-01-27 NOTE — TELEPHONE ENCOUNTER
Care Due:                  Date            Visit Type   Department     Provider  --------------------------------------------------------------------------------                                             [facility]     MED/ INTERNAL  Last Visit: 11-      FOLLOW UP    MED/ PEDERWIN SPRINGER -         [facility]      MED/ INTERNAL  Next Visit: 05-      CARE ([facility])   MED/ ORLIN Paris                                                            Last  Test          Frequency    Reason                     Performed    Due Date  --------------------------------------------------------------------------------    Lipid Panel.  12 months..  atorvastatin.............  04-   04-    [employer] Embedded Care Due Messages. Reference number: [id number].   1/27/2024 10:58:38 AM CST

## 2024-01-28 NOTE — TELEPHONE ENCOUNTER
Refill Routing Note   Medication(s) are not appropriate for processing by [facility] for the following reason(s):        Other  No active prescription written by provider  Last prescribed for patient on 05/06/22; DEFER    [facility] action(s):  Defer     Requires labs : Yes             Appointments  past 12m or future 3m with PCP    Date Provider   Last Visit   11/17/2023 Jorge Paris MD   Next Visit   5/28/2024 Jorge Paris MD   ED visits in past 90 days: 0        Note composed:8:58 AM 01/28/2024

## 2024-01-29 RX ORDER — ATORVASTATIN CALCIUM 80 MG/1
80 TABLET, FILM COATED ORAL NIGHTLY
Qty: 90 TABLET | Refills: 3 | Status: SHIPPED | OUTPATIENT
Start: 2024-01-29

## 2024-02-08 ENCOUNTER — PATIENT MESSAGE (OUTPATIENT)
Dept: ADMINISTRATIVE | Facility: HOSPITAL | Age: 74
End: 2024-02-08
Payer: MEDICARE

## 2024-02-26 ENCOUNTER — PATIENT MESSAGE (OUTPATIENT)
Dept: ADMINISTRATIVE | Facility: HOSPITAL | Age: 74
End: 2024-02-26
Payer: MEDICARE

## 2024-03-04 ENCOUNTER — PATIENT MESSAGE (OUTPATIENT)
Dept: ADMINISTRATIVE | Facility: HOSPITAL | Age: 74
End: 2024-03-04
Payer: MEDICARE

## 2024-03-06 ENCOUNTER — LAB VISIT (OUTPATIENT)
Dept: LAB | Facility: HOSPITAL | Age: 74
End: 2024-03-06
Attending: HOSPITALIST
Payer: MEDICARE

## 2024-03-06 ENCOUNTER — PATIENT OUTREACH (OUTPATIENT)
Dept: ADMINISTRATIVE | Facility: HOSPITAL | Age: 74
End: 2024-03-06
Payer: MEDICARE

## 2024-03-06 DIAGNOSIS — Z12.11 SCREENING FOR COLORECTAL CANCER: Primary | ICD-10-CM

## 2024-03-06 DIAGNOSIS — Z79.4 TYPE 2 DIABETES MELLITUS WITH HYPERGLYCEMIA, WITH LONG-TERM CURRENT USE OF INSULIN: ICD-10-CM

## 2024-03-06 DIAGNOSIS — E11.65 TYPE 2 DIABETES MELLITUS WITH HYPERGLYCEMIA, WITH LONG-TERM CURRENT USE OF INSULIN: ICD-10-CM

## 2024-03-06 DIAGNOSIS — Z12.12 SCREENING FOR COLORECTAL CANCER: Primary | ICD-10-CM

## 2024-03-06 LAB
ALBUMIN SERPL BCP-MCNC: 2.7 G/DL (ref 3.5–5.2)
ANION GAP SERPL CALC-SCNC: 5 MMOL/L (ref 8–16)
BUN SERPL-MCNC: 31 MG/DL (ref 8–23)
CALCIUM SERPL-MCNC: 8.6 MG/DL (ref 8.7–10.5)
CHLORIDE SERPL-SCNC: 106 MMOL/L (ref 95–110)
CO2 SERPL-SCNC: 25 MMOL/L (ref 23–29)
CREAT SERPL-MCNC: 1.6 MG/DL (ref 0.5–1.4)
EST. GFR  (NO RACE VARIABLE): 34 ML/MIN/1.73 M^2
ESTIMATED AVG GLUCOSE: 203 MG/DL (ref 68–131)
GLUCOSE SERPL-MCNC: 286 MG/DL (ref 70–110)
HBA1C MFR BLD: 8.7 % (ref 4–5.6)
PHOSPHATE SERPL-MCNC: 3.8 MG/DL (ref 2.7–4.5)
POTASSIUM SERPL-SCNC: 4.7 MMOL/L (ref 3.5–5.1)
SODIUM SERPL-SCNC: 136 MMOL/L (ref 136–145)

## 2024-03-06 PROCEDURE — 83036 HEMOGLOBIN GLYCOSYLATED A1C: CPT | Performed by: HOSPITALIST

## 2024-03-06 PROCEDURE — 80069 RENAL FUNCTION PANEL: CPT | Performed by: HOSPITALIST

## 2024-03-06 PROCEDURE — 36415 COLL VENOUS BLD VENIPUNCTURE: CPT | Performed by: HOSPITALIST

## 2024-03-13 ENCOUNTER — OFFICE VISIT (OUTPATIENT)
Dept: ENDOCRINOLOGY | Facility: CLINIC | Age: 74
End: 2024-03-13
Payer: MEDICARE

## 2024-03-13 VITALS
DIASTOLIC BLOOD PRESSURE: 80 MMHG | WEIGHT: 182.81 LBS | HEART RATE: 83 BPM | BODY MASS INDEX: 26.99 KG/M2 | SYSTOLIC BLOOD PRESSURE: 120 MMHG

## 2024-03-13 DIAGNOSIS — E11.3293 MILD NONPROLIFERATIVE DIABETIC RETINOPATHY OF BOTH EYES ASSOCIATED WITH TYPE 2 DIABETES MELLITUS, MACULAR EDEMA PRESENCE UNSPECIFIED: ICD-10-CM

## 2024-03-13 DIAGNOSIS — Z79.4 UNCONTROLLED TYPE 2 DIABETES MELLITUS WITH HYPERGLYCEMIA, WITH LONG-TERM CURRENT USE OF INSULIN: Primary | ICD-10-CM

## 2024-03-13 DIAGNOSIS — E11.59 HYPERTENSION ASSOCIATED WITH DIABETES: Chronic | ICD-10-CM

## 2024-03-13 DIAGNOSIS — I25.2 HISTORY OF MYOCARDIAL INFARCTION: ICD-10-CM

## 2024-03-13 DIAGNOSIS — I15.2 HYPERTENSION ASSOCIATED WITH DIABETES: Chronic | ICD-10-CM

## 2024-03-13 DIAGNOSIS — N18.31 STAGE 3A CHRONIC KIDNEY DISEASE: ICD-10-CM

## 2024-03-13 DIAGNOSIS — E11.65 UNCONTROLLED TYPE 2 DIABETES MELLITUS WITH HYPERGLYCEMIA, WITH LONG-TERM CURRENT USE OF INSULIN: Primary | ICD-10-CM

## 2024-03-13 PROCEDURE — 1159F MED LIST DOCD IN RCRD: CPT | Mod: CPTII,S$GLB,, | Performed by: HOSPITALIST

## 2024-03-13 PROCEDURE — 3288F FALL RISK ASSESSMENT DOCD: CPT | Mod: CPTII,S$GLB,, | Performed by: HOSPITALIST

## 2024-03-13 PROCEDURE — 99999 PR PBB SHADOW E&M-EST. PATIENT-LVL V: CPT | Mod: PBBFAC,,, | Performed by: HOSPITALIST

## 2024-03-13 PROCEDURE — 1160F RVW MEDS BY RX/DR IN RCRD: CPT | Mod: CPTII,S$GLB,, | Performed by: HOSPITALIST

## 2024-03-13 PROCEDURE — 3074F SYST BP LT 130 MM HG: CPT | Mod: CPTII,S$GLB,, | Performed by: HOSPITALIST

## 2024-03-13 PROCEDURE — 3079F DIAST BP 80-89 MM HG: CPT | Mod: CPTII,S$GLB,, | Performed by: HOSPITALIST

## 2024-03-13 PROCEDURE — 3008F BODY MASS INDEX DOCD: CPT | Mod: CPTII,S$GLB,, | Performed by: HOSPITALIST

## 2024-03-13 PROCEDURE — 99214 OFFICE O/P EST MOD 30 MIN: CPT | Mod: S$GLB,,, | Performed by: HOSPITALIST

## 2024-03-13 PROCEDURE — 3052F HG A1C>EQUAL 8.0%<EQUAL 9.0%: CPT | Mod: CPTII,S$GLB,, | Performed by: HOSPITALIST

## 2024-03-13 PROCEDURE — 1101F PT FALLS ASSESS-DOCD LE1/YR: CPT | Mod: CPTII,S$GLB,, | Performed by: HOSPITALIST

## 2024-03-13 RX ORDER — INSULIN GLARGINE 100 [IU]/ML
10 INJECTION, SOLUTION SUBCUTANEOUS NIGHTLY
Qty: 30 ML | Refills: 6 | Status: SHIPPED | OUTPATIENT
Start: 2024-03-13 | End: 2024-05-28

## 2024-03-13 RX ORDER — DULAGLUTIDE 3 MG/.5ML
3 INJECTION, SOLUTION SUBCUTANEOUS
Qty: 4 PEN | Refills: 11 | Status: SHIPPED | OUTPATIENT
Start: 2024-03-13 | End: 2024-05-28

## 2024-03-13 RX ORDER — INSULIN ASPART 100 [IU]/ML
20 INJECTION, SOLUTION INTRAVENOUS; SUBCUTANEOUS
Qty: 30 ML | Refills: 8 | Status: SHIPPED | OUTPATIENT
Start: 2024-03-13

## 2024-03-13 RX ORDER — PEN NEEDLE, DIABETIC 30 GX3/16"
NEEDLE, DISPOSABLE MISCELLANEOUS
Qty: 150 EACH | Refills: 11 | Status: SHIPPED | OUTPATIENT
Start: 2024-03-13

## 2024-03-13 NOTE — PROGRESS NOTES
"Subjective:      Patient ID: Lorena Contreras is a 73 y.o. female presented to [facility] on 3/13/2024.  Chief Complaint:  Diabetes    History of Present Illness: Lorena Contreras is a 73 y.o. female here for   type 2 diabetes  Other significant past medical history: CAD s/p MI 3/2019 s/p stent placement,  hypertension    Diabetes mellitus Type 2  - Known diabetic complications: retinopathy, peripheral neuropathy, cardiovascular disease, and cerebrovascular disease  - Diagnosed w/ DM: in >20 years  - Diabetes Education: Yes, many years ago, [facility] [place]  - Family history of diabetes: Yes  - Hx of pancreatitis/Diabetes Related Hospitalization:  No  - Patient  quite concerned about medication induced nausea and vomiting.  She attributes her symptoms to a lot of diabetes medication in the past  - Pt afraid to take insulin because she fears hypoglycemia.  Patient with symptoms of hypoglycemia less than 100  - 1st time seen by me 8/24/2023: Patient with chronic poorly-controlled type 2 diabetes, recent A1c 13%, previous 14%.  Previously seen by  Concepcion Zee NP for diabetes management.  Last office visit  10/2022, with patient not showing up to follow-up appointment. Patient was not happy at her last office visit due to drastic changes to her regimen  leading to to chronic nausea and vomiting once again. She attributes the as Tresiba the cause of her nausea  .  Patient on Trulicity Finds that her BGs have improved with Trulicity, down from 400-500s to now 200s. But today, BG erick to 400s after eating yogurt and banana; no insulin yet today. Currently not on Dexcom G6 due to issues, patient inadvertently  threw out her transmitter  - overall patient is a very poor historian. Reports that she has a bad memory. When  discussing about nutrition and Diabetes Education," I know all about it"  - having issue getting Dexcom from [facility]      Interval history:  patient is here for follow-up type 2 " diabetes.  A1c  Decrease 8.7%.  Did not bring Dexcom reader for downloaded and review.  Therefore no data available.  Arrives with sister Milton.   Compliant with NovoLog 2 times a day with meals (does not eat breakfast).  Levemir at night with reported occasional low (glucose 40s). Currently does not have any problem with nausea.  Does have recent   Shingles episode, got gabapentin from PCP for neuropathy      Current reported meds:    Novolog: 10 units for breakfast, 20 units for lunch,  20 unit for dinner  Levemir 15 units nightly at 8 PM  Trulicity 3.0 mg once a week.     Compliant with insulin.  Rotating injection sites  Previous meds tried:              Metformin 1000 mg bid  Novolog 70/30 30 units BID ac   Glipizide dc'd d/t recurrent hypoglycemia; also had nausea   Victoza - severe nausea and abdominal pain   Started mixed analog insulin 5/2017  Home glucose checks: checks 3x a day, Logs reviewed/Unavailable: oral recall: 300  - Hypoglycemia symptoms:  when her glucose decreased to low 100s  Diet/Exercise:   - CURRENT DIET: Doesn't generally eat breakfast. In the mornings, she usually just drinks coffee decaf.   Eats 2 meals/day, lunch 4 pm and dinner at 9 pm. First food intake is at 4 pm, no snacks until after dinner. Likes to eat 1/2 nutty butty after dinner.   Drinks unsweet tea, green tea without sugar.   - Weight trend: stable  - Occupation: not working  - Eye exam current (within one year): yes, DR:  yes  - Reports cuts or ulcers on feet:   Denies, has podiatrist  - Statin: Taking, ACE/ARB: Not taking    Diabetes lab work  Lab Results   Component Value Date    HGBA1C 8.7 (H) 03/06/2024    HGBA1C 9.3 (H) 11/29/2023    HGBA1C 8.9 (H) 11/04/2023    HGBA1C 13.9 (H) 04/10/2023     Chart review show longstanding history of poorly-controlled type 2 diabetes, with A1c above 8% for many years.  Recently A1C 13, 14      Lab Results   Component Value Date    CPEPTIDE 3.89 11/29/2023      No results found for:  ""FRUCTOSAMINE"  Lab Results   Component Value Date    MICALBCREAT 921.7 (H) 06/22/2023     Lab Results   Component Value Date    NWJUIONA57 1161 (H) 05/26/2022     Diabetes Management Status: Reviewed this office visit  Screening or Prevention Patient's value Goal Complete/Controlled?   Lipid profile : 04/10/2023 Annually Yes   Dilated retinal exam : 03/12/2021 Annually Yes   Foot exam   : 10/20/2022 Annually Yes      Reviewed past surgical, medical, family, social history and updated as appropriate.  Review of Systems: see HPI above  Objective:   /80   Pulse 83   Wt 82.9 kg (182 lb 12.8 oz)   BMI 26.99 kg/m²   Body mass index is 26.99 kg/m².  Vital signs reviewed    Physical Exam  Vitals and nursing note reviewed.   Constitutional:       Appearance: Normal appearance. She is well-developed. She is obese. She is not ill-appearing.   Neck:      Thyroid: No thyromegaly.   Pulmonary:      Effort: Pulmonary effort is normal. No respiratory distress.   Musculoskeletal:         General: Normal range of motion.      Cervical back: Normal range of motion.   Neurological:      General: No focal deficit present.      Mental Status: She is alert. Mental status is at baseline.   Psychiatric:         Mood and Affect: Mood normal.         Behavior: Behavior normal.     Lab Reviewed:  See results in subjective  Lab Results   Component Value Date    HGBA1C 8.7 (H) 03/06/2024     Lab Results   Component Value Date    CHOL 108 (L) 04/10/2023    HDL 34 (L) 04/10/2023    LDLCALC 48.0 (L) 04/10/2023    TRIG 130 04/10/2023    CHOLHDL 31.5 04/10/2023     Lab Results   Component Value Date     03/06/2024    K 4.7 03/06/2024     03/06/2024    CO2 25 03/06/2024     (H) 03/06/2024    BUN 31 (H) 03/06/2024    CREATININE 1.6 (H) 03/06/2024    CALCIUM 8.6 (L) 03/06/2024    PHOS 3.8 03/06/2024    PROT 7.2 11/03/2023    ALBUMIN 2.7 (L) 03/06/2024    BILITOT 0.4 11/03/2023    ALKPHOS 175 (H) 11/03/2023    AST 41 (H) " "11/03/2023    ALT 53 (H) 11/03/2023    ANIONGAP 5 (L) 03/06/2024    ESTGFRAFRICA 53 (A) 06/21/2022    EGFRNONAA 46 (A) 06/21/2022    TSH 0.602 04/10/2023    PTH 66.5 10/22/2021    [id number] 87 08/23/2016     Assessment     1. Uncontrolled type 2 diabetes mellitus with hyperglycemia, with long-term current use of insulin  insulin (LANTUS SOLOSTAR U-100 INSULIN) glargine 100 units/mL SubQ pen    dulaglutide (TRULICITY) 3 mg/0.5 mL pen injector    NOVOLOG FLEXPEN U-100 INSULIN 100 unit/mL (3 mL) InPn pen    pen needle, diabetic (BD ULTRA-FINE SAGAR PEN NEEDLE) 32 gauge x 5/32" Ndle    Ambulatory referral/consult to Podiatry    HEMOGLOBIN A1C    Basic Metabolic Panel      2. Mild nonproliferative diabetic retinopathy of both eyes associated with type 2 diabetes mellitus, macular edema presence unspecified        3. Stage 3a chronic kidney disease  Microalbumin/Creatinine Ratio, Urine      4. Hypertension associated with diabetes        5. History of myocardial infarction          Plan     Uncontrolled type 2 diabetes mellitus with hyperglycemia, with long-term current use of insulin  - Diabetes is not at goal but improved, given most current A1C, Goal A1C for patient is 7%  - Complicated by hyperglycemia, Longstanding history of noncompliance to insulin regimen, poor insight into diabetes care  - Patient with fear of hypoglycemia, therefore leaving her glucose high  - Patient reports chronic nausea and vomiting  For which she attributed due to her diabetes medications, this has limited treatment option and compliance in the past.  Of note currently does not have any issues with her current regimen 12/6/2023  - Diabetes goal including glucose glucose and A1C goals discussed  - Discussed proper insulin injection technique: Including rotation of injection sites, using new insulin needles  - Diabetic supplies/medications were reviewed this visit to ensure continue steady supplies  - continue to benefit from Dexcom " G7    Plan  - Continue encouragement of insulin compliance.  Currently patient seems motivated to lower her A1c.  Denies any issues with her current regimen.  - Continue Novolog: 10 units for breakfast, 20 units for lunch,  20 unit for dinner.  Monitor for hypoglycemia postprandial especially at night  - switch patient and decrease Lantus 10 units nightly at 8 PM  - continue Trulicity 3.0 mg once a week, does have supplies currently.  Confirm with patient that she is not having any nausea with this medication.  Would avoid increasing the 4.5 mg as she had issues with a past  - Encouragement of dietary modification, portion size control, decreasing carbohydrates intake  - Restart Dexcom G7 paperwork will be we sent to [facility]  - Follow up as scheduled, 3 months      Hypertension associated with diabetes  - blood pressure review in clinic today  - manage by PCP    Stage 3a chronic kidney disease  - Renal function reviewed on lab work today, stable   - Renally Adjust diabetes medication, avoid hypoglycemia  - continue routine monitoring  - consider SGLT-2 Inh, consider nephrology referral    Mild nonproliferative diabetic retinopathy of both eyes associated with type 2 diabetes mellitus  -  continue routine  eye monitoring    History of myocardial infarction  -  continue to benefit from the use of GLP1    Advised patient to follow up with PCP for routine health maintenance care.   RTC in 3 months    Gerardo Barbour M.D.  Endocrinology  [facility]  3/13/2024      Disclaimer: This note has been generated in part with the use of voice-recognition software. There may be typographical errors that have been missed during proof-reading.    -------------------------------------------------------------------------------------------------  Indications for CGMs:    Patent with diagnosed: Diabetes Mellitus that required frequent glucose monitoring (4 + times a day and 4x/day testing), frequent adjustments to  insulin regiment based on BG or CGM results. Patent requires a therapeutic CGM and is willing to use therapeutic CGM/SMBG for Necessary frequent adjustments in insulin therapy, patient demonstrated an understanding of the technology (can use and recognize alerts and alarms). I, the physician, have assessed adherence to CGM regimen and to the plan, patient will have close follow up in clinic as indicated, every 3 months to 6 months.     Patient experiences (including but not limited to):  [x]   Discrepancies between records and A1C, at risk severe hypoglycemia episode and hospitalization  []   Recurrent, unexplained, severe hypoglycemic episodes  [x]   Postprandial hyperglycemia  [x]   Unexplained blood glucose excursions  []   Impaired awareness of hypoglycemia    I believe that the patient will greatly benefit from wearing CGM because this will allow for better glucose control, help to avoid high/lows and prevent hospitalization.  This also is safer for patient in using CGM during the COVID public health emergency.

## 2024-03-13 NOTE — PATIENT INSTRUCTIONS
Lantus 10 units nightly at 8 PM    Novolog: 10 units for breakfast, 20 units for lunch,  20 unit for dinner    Trulicity 3.0 mg once a week

## 2024-03-13 NOTE — ASSESSMENT & PLAN NOTE
- Diabetes is not at goal but improved, given most current A1C, Goal A1C for patient is 7%  - Complicated by hyperglycemia, Longstanding history of noncompliance to insulin regimen, poor insight into diabetes care  - Patient with fear of hypoglycemia, therefore leaving her glucose high  - Patient reports chronic nausea and vomiting  For which she attributed due to her diabetes medications, this has limited treatment option and compliance in the past.  Of note currently does not have any issues with her current regimen 12/6/2023  - Diabetes goal including glucose glucose and A1C goals discussed  - Discussed proper insulin injection technique: Including rotation of injection sites, using new insulin needles  - Diabetic supplies/medications were reviewed this visit to ensure continue steady supplies  - continue to benefit from Dexcom G7    Plan  - Continue encouragement of insulin compliance.  Currently patient seems motivated to lower her A1c.  Denies any issues with her current regimen.  - Continue Novolog: 10 units for breakfast, 20 units for lunch,  20 unit for dinner.  Monitor for hypoglycemia postprandial especially at night  - switch patient and decrease Lantus 10 units nightly at 8 PM  - continue Trulicity 3.0 mg once a week, does have supplies currently.  Confirm with patient that she is not having any nausea with this medication.  Would avoid increasing the 4.5 mg as she had issues with a past  - Encouragement of dietary modification, portion size control, decreasing carbohydrates intake  - Restart Dexcom G7 paperwork will be we sent to [facility]  - Follow up as scheduled, 3 months

## 2024-03-13 NOTE — ASSESSMENT & PLAN NOTE
- Renal function reviewed on lab work today, stable   - Renally Adjust diabetes medication, avoid hypoglycemia  - continue routine monitoring  - consider SGLT-2 Inh, consider nephrology referral

## 2024-03-22 ENCOUNTER — TELEPHONE (OUTPATIENT)
Dept: CARDIOLOGY | Facility: CLINIC | Age: 74
End: 2024-03-22
Payer: MEDICARE

## 2024-03-22 NOTE — TELEPHONE ENCOUNTER
----- Message from Tamara Spaulding sent at 3/22/2024  1:04 PM CDT -----  Regarding: self [phone]  .Type: Patient Call Back    Who called:self    What is the request in detail:patient requesting a paper from MD stating she has a cardiac problems to send over to her insurance company. If not her insurance will be canceled. Dennys stated they've faxed over the paper work twice and haven't received any feedback.    Can the clinic reply by [facility]? no    Would the patient rather a call back or a response via [facility]?call back     Best call back number [phone]

## 2024-03-27 ENCOUNTER — PATIENT MESSAGE (OUTPATIENT)
Dept: ADMINISTRATIVE | Facility: HOSPITAL | Age: 74
End: 2024-03-27
Payer: MEDICARE

## 2024-03-27 ENCOUNTER — TELEPHONE (OUTPATIENT)
Dept: FAMILY MEDICINE | Facility: CLINIC | Age: 74
End: 2024-03-27
Payer: MEDICARE

## 2024-03-27 NOTE — TELEPHONE ENCOUNTER
Spoke with patient and informed her that her paperwork is completed, however, we need her to come in and sign it in order to fax it back to [facility]. Patient agreed to come in today.

## 2024-04-17 DIAGNOSIS — E11.9 TYPE 2 DIABETES MELLITUS WITHOUT COMPLICATION: ICD-10-CM

## 2024-04-22 ENCOUNTER — PATIENT MESSAGE (OUTPATIENT)
Dept: ADMINISTRATIVE | Facility: HOSPITAL | Age: 74
End: 2024-04-22
Payer: MEDICARE

## 2024-04-22 DIAGNOSIS — E11.59 HYPERTENSION ASSOCIATED WITH DIABETES: ICD-10-CM

## 2024-04-22 DIAGNOSIS — I15.2 HYPERTENSION ASSOCIATED WITH DIABETES: ICD-10-CM

## 2024-04-22 NOTE — TELEPHONE ENCOUNTER
----- Message from Tatum Rea sent at 4/22/2024 11:09 AM CDT -----  Regarding: self  Type: RX Refill Request       Who Called: self        Have you contacted your pharmacy: yes       Refill or New Rx: refill       RX Name and Strength: atenoloL (TENORMIN) 50 MG tablet        Preferred Pharmacy with phone number:  [facility] [place] - [address]  [address]  [place] [address]  Phone: [phone] Fax: [phone]        Local or Mail Order: local          Would the patient rather a call back or a response via [facility]? [phone]

## 2024-04-22 NOTE — TELEPHONE ENCOUNTER
Call returned to patient who requests refill on atenolol medication. Last BP at endocrinolgist appointment 3-13-24, /80 P83. Medication pended for PCP. Verbalized understanding and thank you.

## 2024-04-22 NOTE — TELEPHONE ENCOUNTER
Care Due:                  Date            Visit Type   Department     Provider  --------------------------------------------------------------------------------                                             [facility]     MED/ INTERNAL  Last Visit: 11-      FOLLOW UP    MED/ P[facility]ER[facility] -         [facility]      MED/ INTERNAL  Next Visit: 05-      CARE ([facility])   MED/ ORLIN [place]                                                            Last  Test          Frequency    Reason                     Performed    Due Date  --------------------------------------------------------------------------------    Lipid Panel.  12 months..  atorvastatin.............  04-   04-    [employer] Embedded Care Due Messages. Reference number: [id number].   4/22/2024 1:30:56 PM CDT

## 2024-04-23 RX ORDER — ATENOLOL 50 MG/1
50 TABLET ORAL 2 TIMES DAILY
Qty: 180 TABLET | Refills: 3 | Status: SHIPPED | OUTPATIENT
Start: 2024-04-23 | End: 2024-05-28

## 2024-05-06 ENCOUNTER — PATIENT MESSAGE (OUTPATIENT)
Dept: ADMINISTRATIVE | Facility: HOSPITAL | Age: 74
End: 2024-05-06
Payer: MEDICARE

## 2024-05-28 ENCOUNTER — OFFICE VISIT (OUTPATIENT)
Dept: FAMILY MEDICINE | Facility: CLINIC | Age: 74
End: 2024-05-28
Payer: MEDICARE

## 2024-05-28 VITALS
HEART RATE: 69 BPM | HEIGHT: 69 IN | WEIGHT: 186.38 LBS | TEMPERATURE: 98 F | SYSTOLIC BLOOD PRESSURE: 110 MMHG | DIASTOLIC BLOOD PRESSURE: 70 MMHG | BODY MASS INDEX: 27.6 KG/M2 | OXYGEN SATURATION: 97 %

## 2024-05-28 DIAGNOSIS — E11.3592 PROLIFERATIVE DIABETIC RETINOPATHY OF LEFT EYE ASSOCIATED WITH TYPE 2 DIABETES MELLITUS, UNSPECIFIED PROLIFERATIVE RETINOPATHY TYPE: ICD-10-CM

## 2024-05-28 DIAGNOSIS — Z79.4 UNCONTROLLED TYPE 2 DIABETES MELLITUS WITH HYPERGLYCEMIA, WITH LONG-TERM CURRENT USE OF INSULIN: ICD-10-CM

## 2024-05-28 DIAGNOSIS — B02.29 POST HERPETIC NEURALGIA: ICD-10-CM

## 2024-05-28 DIAGNOSIS — I50.32 CHRONIC DIASTOLIC HEART FAILURE: ICD-10-CM

## 2024-05-28 DIAGNOSIS — E11.22 TYPE 2 DIABETES MELLITUS WITH STAGE 3A CHRONIC KIDNEY DISEASE, WITH LONG-TERM CURRENT USE OF INSULIN: Primary | ICD-10-CM

## 2024-05-28 DIAGNOSIS — F41.9 ANXIETY: ICD-10-CM

## 2024-05-28 DIAGNOSIS — I70.0 AORTIC ATHEROSCLEROSIS: ICD-10-CM

## 2024-05-28 DIAGNOSIS — E11.65 UNCONTROLLED TYPE 2 DIABETES MELLITUS WITH HYPERGLYCEMIA, WITH LONG-TERM CURRENT USE OF INSULIN: ICD-10-CM

## 2024-05-28 DIAGNOSIS — N18.31 TYPE 2 DIABETES MELLITUS WITH STAGE 3A CHRONIC KIDNEY DISEASE, WITH LONG-TERM CURRENT USE OF INSULIN: Primary | ICD-10-CM

## 2024-05-28 DIAGNOSIS — J42 CHRONIC BRONCHITIS, UNSPECIFIED CHRONIC BRONCHITIS TYPE: ICD-10-CM

## 2024-05-28 DIAGNOSIS — I15.2 HYPERTENSION ASSOCIATED WITH DIABETES: Chronic | ICD-10-CM

## 2024-05-28 DIAGNOSIS — E11.59 HYPERTENSION ASSOCIATED WITH DIABETES: Chronic | ICD-10-CM

## 2024-05-28 DIAGNOSIS — Z79.4 TYPE 2 DIABETES MELLITUS WITH STAGE 3A CHRONIC KIDNEY DISEASE, WITH LONG-TERM CURRENT USE OF INSULIN: Primary | ICD-10-CM

## 2024-05-28 DIAGNOSIS — I25.118 CORONARY ARTERY DISEASE OF NATIVE ARTERY OF NATIVE HEART WITH STABLE ANGINA PECTORIS: ICD-10-CM

## 2024-05-28 DIAGNOSIS — J20.9 ACUTE BRONCHITIS, UNSPECIFIED ORGANISM: ICD-10-CM

## 2024-05-28 DIAGNOSIS — C82.90 FOLLICULAR LYMPHOMA, UNSPECIFIED FOLLICULAR LYMPHOMA TYPE, UNSPECIFIED BODY REGION: ICD-10-CM

## 2024-05-28 DIAGNOSIS — N18.32 STAGE 3B CHRONIC KIDNEY DISEASE: ICD-10-CM

## 2024-05-28 PROBLEM — G40.109 EPILEPSIA PARTIALIS CONTINUA: Status: RESOLVED | Noted: 2023-04-28 | Resolved: 2024-05-28

## 2024-05-28 PROCEDURE — 99215 OFFICE O/P EST HI 40 MIN: CPT | Mod: HCNC,S$GLB,, | Performed by: INTERNAL MEDICINE

## 2024-05-28 PROCEDURE — 1101F PT FALLS ASSESS-DOCD LE1/YR: CPT | Mod: HCNC,CPTII,S$GLB, | Performed by: INTERNAL MEDICINE

## 2024-05-28 PROCEDURE — 99999 PR PBB SHADOW E&M-EST. PATIENT-LVL III: CPT | Mod: PBBFAC,HCNC,, | Performed by: INTERNAL MEDICINE

## 2024-05-28 PROCEDURE — 3288F FALL RISK ASSESSMENT DOCD: CPT | Mod: HCNC,CPTII,S$GLB, | Performed by: INTERNAL MEDICINE

## 2024-05-28 PROCEDURE — 3052F HG A1C>EQUAL 8.0%<EQUAL 9.0%: CPT | Mod: HCNC,CPTII,S$GLB, | Performed by: INTERNAL MEDICINE

## 2024-05-28 PROCEDURE — G2211 COMPLEX E/M VISIT ADD ON: HCPCS | Mod: HCNC,S$GLB,, | Performed by: INTERNAL MEDICINE

## 2024-05-28 PROCEDURE — 3074F SYST BP LT 130 MM HG: CPT | Mod: HCNC,CPTII,S$GLB, | Performed by: INTERNAL MEDICINE

## 2024-05-28 PROCEDURE — 3078F DIAST BP <80 MM HG: CPT | Mod: HCNC,CPTII,S$GLB, | Performed by: INTERNAL MEDICINE

## 2024-05-28 PROCEDURE — 1159F MED LIST DOCD IN RCRD: CPT | Mod: HCNC,CPTII,S$GLB, | Performed by: INTERNAL MEDICINE

## 2024-05-28 PROCEDURE — 1125F AMNT PAIN NOTED PAIN PRSNT: CPT | Mod: HCNC,CPTII,S$GLB, | Performed by: INTERNAL MEDICINE

## 2024-05-28 PROCEDURE — 3008F BODY MASS INDEX DOCD: CPT | Mod: HCNC,CPTII,S$GLB, | Performed by: INTERNAL MEDICINE

## 2024-05-28 RX ORDER — LORAZEPAM 1 MG/1
1 TABLET ORAL 2 TIMES DAILY PRN
Qty: 30 TABLET | Refills: 0 | Status: SHIPPED | OUTPATIENT
Start: 2024-05-28 | End: 2024-06-27

## 2024-05-28 RX ORDER — ALBUTEROL SULFATE 90 UG/1
2 AEROSOL, METERED RESPIRATORY (INHALATION) EVERY 6 HOURS PRN
Qty: 18 G | Refills: 0 | Status: SHIPPED | OUTPATIENT
Start: 2024-05-28

## 2024-05-28 RX ORDER — METOPROLOL SUCCINATE 25 MG/1
25 TABLET, EXTENDED RELEASE ORAL DAILY
Qty: 90 TABLET | Refills: 3 | Status: SHIPPED | OUTPATIENT
Start: 2024-05-28 | End: 2025-05-28

## 2024-05-28 RX ORDER — LIDOCAINE 50 MG/G
1 PATCH TOPICAL DAILY
Qty: 14 PATCH | Refills: 5 | Status: SHIPPED | OUTPATIENT
Start: 2024-05-28

## 2024-05-28 RX ORDER — SEMAGLUTIDE 0.68 MG/ML
INJECTION, SOLUTION SUBCUTANEOUS
Qty: 3 ML | Refills: 1 | Status: SHIPPED | OUTPATIENT
Start: 2024-05-28

## 2024-05-28 RX ORDER — INSULIN GLARGINE 100 [IU]/ML
10 INJECTION, SOLUTION SUBCUTANEOUS NIGHTLY
Start: 2024-05-28

## 2024-05-28 NOTE — Clinical Note
Please let pt know given her worsening kidney function needs to see NEPHROLOGY - Please contact our Referrals Coordinator at [phone] if you have not received a call within 2 business days to check on the status

## 2024-05-28 NOTE — PROGRESS NOTES
Subjective:     Chief Complaint   Patient presents with    Follow-up       HPI  Lorena Contreras is a 73 y.o. female with medical diagnoses as listed in the medical history and problem list that presents for above complaint(s).    T2DM   Last A1c 8.7%  Lantus 10 units nightly  Novolog TID 10/20/20  Trulicity 3 mg weekly - now out of stock for the past 2 months    Having a tremor during activity as well as at rest  Started 3 months     Still noting burning pain/discmfor t  Of left flank due to shingles     [facility] - does have floaters    Patient Care Team:  Jorge Paris MD as PCP - General (Internal Medicine)  Javier Reilly OD as Consulting Physician (Optometry)  Aiden Zee OD as Consulting Physician (Ophthalmology)  Mary Bojorquez III, MD as Consulting Physician (Orthopedic Surgery)  [facility] - (DME Provider)  Haritha Barbour MA as Care Coordinator  Kimberly Mckoy as ED Navigator      PAST MEDICAL HISTORY:  Past Medical History:   Diagnosis Date    Allergy     Altered mental status 06/19/2022    DYSARTHRIA, SPASTIC MOVEMENTS & DIFFICULTY SWALLOWING    Anemia     Anxiety     Arthritis     Cataract     both removed    Colon polyps     Coronary artery disease     Depression     Diabetes mellitus, type II     Disorder of kidney and ureter     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/2023    Fibromyalgia     Follicular lymphoma     GERD (gastroesophageal reflux disease)     HTN (hypertension)     Hyperlipidemia     MI (myocardial infarction) 03/2019    Personal history of colonic polyps     Restless leg syndrome     Stroke        PAST SURGICAL HISTORY:  Past Surgical History:   Procedure Laterality Date    COLONOSCOPY  11/07/2012    Colon polyp found; repeat in 5 years    ELBOW SURGERY Right 2015    dislocation repair     ESOPHAGOGASTRODUODENOSCOPY  11/07/2012    atrophic gastritis, H pylori testing negative    INCISION AND DRAINAGE FOOT Right 6/2/2021    Procedure:  INCISION AND DRAINAGE, FOOT, bone biopsy;  Surgeon: Quiana Penn DPM;  Location: [facility] OR;  Service: Podiatry;  Laterality: Right;    KNEE SURGERY Bilateral 2015    scoped    LEFT HEART CATHETERIZATION Left 3/29/2019    Procedure: Left heart cath;  Surgeon: Bladimir Barbosa MD;  Location: [facility] CATH LAB;  Service: Cardiology;  Laterality: Left;    LEFT HEART CATHETERIZATION Left 11/18/2019    Procedure: Left heart cath;  Surgeon: Karl Rico MD;  Location: [facility] CATH LAB;  Service: Cardiology;  Laterality: Left;    LEFT HEART CATHETERIZATION Left 1/8/2020    Procedure: Left heart cath, right radial, noon start;  Surgeon: Christos Monreal MD;  Location: [facility] CATH LAB;  Service: Cardiology;  Laterality: Left;  RN Pre Op 1-6-20.  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  2 dtr.  Lives with .  3 cats and a dog.  Retired.  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  Lives in house, 1 story and 4 steps up and has a ramp.      Enjoys crafting.  Unable to bowl due to myalgias.       Social Determinants of Health     Financial Resource Strain: Low Risk  (7/21/2021)    Overall Financial Resource Strain (CARDIA)     Difficulty of Paying Living Expenses: Not hard at all   Food Insecurity: No Food Insecurity (7/21/2021)    Hunger Vital Sign     Worried About Running Out of Food in the Last Year:  Never true     Ran Out of Food in the Last Year: Never true   Transportation Needs: Unmet Transportation Needs (7/21/2021)    PRAPARE - Transportation     Lack of Transportation (Medical): Yes     Lack of Transportation (Non-Medical): Yes   Physical Activity: Inactive (7/21/2021)    Exercise Vital Sign     Days of Exercise per Week: 0 days     Minutes of Exercise per Session: 0 min   Stress: Stress Concern Present (7/21/2021)    [employer] [place] [employer] - Occupational Stress Questionnaire     Feeling of Stress : Very much       FAMILY HISTORY:  Family History   Problem Relation Name Age of Onset    Cancer Mother          colon    Heart disease Mother      Cancer Father          lung    Lung cancer Brother      Diabetes Sister      Hypertension Sister      Allergy (severe) Daughter erin     No Known Problems Daughter      Stroke Neg Hx      Hyperlipidemia Neg Hx         ALLERGIES AND MEDICATIONS: updated and reviewed.  Review of patient's allergies indicates:   Allergen Reactions    Novolin 70/30 (semi-synthetic) Nausea And Vomiting     Severe vomiting on Relion 70/30    Sulfa (sulfonamide antibiotics) Anaphylaxis    Talwin [pentazocine lactate] Anaphylaxis    Victoza [liraglutide] Nausea And Vomiting    Glipizide Nausea Only    Citric acid     Codeine     Influenza virus vaccines Hives    Iodine and iodide containing products Hives    Levetiracetam Itching    Rituxan [rituximab] Hives    Zoloft [sertraline] Nausea And Vomiting     Current Outpatient Medications   Medication Sig Dispense Refill    ASCORBIC ACID, VITAMIN C, ORAL Take by mouth once daily.      cholecalciferol, vitamin D3, (VITAMIN D3) 50 mcg (2,000 unit) Cap Take 2 capsules by mouth 2 (two) times daily.      cyanocobalamin (VITAMIN B-12) 1000 MCG tablet Take 100 mcg by mouth once daily.      DEXCOM G7  Misc Use 1  to track blood glucose, ICD10: E11.65 1 each 0    DEXCOM G7 SENSOR Samina Use 1 sensor every 10 days to  "track blood glucose, ICD10: E11.65, okay with 90 day supply if possible 3 each 11    diclofenac sodium (VOLTAREN TOP) Apply topically.      EPINEPHrine (EPIPEN) 0.3 mg/0.3 mL AtIn Inject 0.3 mLs (0.3 mg total) into the muscle once. for 1 dose 0.3 mL 1    FLUoxetine 40 MG capsule Take 1 capsule (40 mg total) by mouth once daily. 90 capsule 3    gabapentin (NEURONTIN) 300 MG capsule Take 1 capsule (300 mg total) by mouth 3 (three) times daily. 90 capsule 1    isosorbide mononitrate (IMDUR) 30 MG 24 hr tablet Take 1 tablet (30 mg total) by mouth once daily. 90 tablet 3    magnesium oxide (MAG-OX) 400 mg tablet Take 400 mg by mouth once daily.      melatonin 10 mg Cap Take 10 mg by mouth nightly.      multivitamin/iron/folic acid (CENTRUM COMPLETE ORAL) Take by mouth.      NOVOLOG FLEXPEN U-100 INSULIN 100 unit/mL (3 mL) InPn pen Inject 20 Units into the skin 3 (three) times daily with meals. 30 mL 8    pantoprazole (PROTONIX) 40 MG tablet Take 1 tablet (40 mg total) by mouth once daily. 90 tablet 3    pen needle, diabetic (BD ULTRA-FINE SAGAR PEN NEEDLE) 32 gauge x 5/32" Ndle One pen needle use with insulin pen 4 times a day.  ICD-10: E11.9 150 each 11    ticagrelor (BRILINTA) 90 mg tablet Take 1 tablet (90 mg total) by mouth 2 (two) times daily. 180 tablet 3    vitamin E 1000 UNIT capsule Take 1,000 Units by mouth once daily.      albuterol (PROVENTIL/VENTOLIN HFA) 90 mcg/actuation inhaler Inhale 2 puffs into the lungs every 6 (six) hours as needed for Wheezing or Shortness of Breath. 18 g 0    aspirin 81 MG Chew Take 1 tablet (81 mg total) by mouth once daily. 90 tablet 3    atorvastatin (LIPITOR) 80 MG tablet Take 1 tablet by mouth in the evening 90 tablet 3    Bacillus coagulans (DIGESTIVE ADVANTAGE IMMUNE ORAL) Take by mouth.      ESOMEPRAZOLE MAGNESIUM ORAL Take by mouth as needed. (Patient not taking: Reported on 5/28/2024)      hydrALAZINE (APRESOLINE) 50 MG tablet Take 1 tablet (50 mg total) by mouth every 8 " "(eight) hours. 180 tablet 3    insulin glargine U-100, Lantus, (LANTUS SOLOSTAR U-100 INSULIN) 100 unit/mL (3 mL) InPn pen Inject 10 Units into the skin every evening.      LIDOcaine (LIDODERM) 5 % Place 1 patch onto the skin once daily. Remove & Discard patch within 12 hours or as directed by MD. 14 patch 5    LORazepam (ATIVAN) 1 MG tablet Take 1 tablet (1 mg total) by mouth 2 (two) times daily as needed for Anxiety. 30 tablet 0    metoprolol succinate (TOPROL-XL) 25 MG 24 hr tablet Take 1 tablet (25 mg total) by mouth once daily. 90 tablet 3    semaglutide (OZEMPIC) 0.25 mg or 0.5 mg (2 mg/3 mL) pen injector Inject 0.25 mg into the skin once weekly for 4 weeks, THEN increase to 0.5 mg once weekly thereafter 3 mL 1     No current facility-administered medications for this visit.         Objective:       Physical Exam  Vitals:    05/28/24 1001   BP: 110/70   Pulse: 69   Temp: 98.3 °F (36.8 °C)   TempSrc: Oral   SpO2: 97%   Weight: 84.5 kg (186 lb 6.4 oz)   Height: 5' 9" (1.753 m)    Body mass index is 27.53 kg/m².  Weight: 84.5 kg (186 lb 6.4 oz)   Height: 5' 9" (175.3 cm)   Physical Exam  Vitals reviewed.   Constitutional:       General: She is not in acute distress.     Appearance: Normal appearance. She is well-developed. She is not ill-appearing.   HENT:      Head: Normocephalic and atraumatic.   Eyes:      Conjunctiva/sclera: Conjunctivae normal.      Pupils: Pupils are equal, round, and reactive to light.   Neck:      Thyroid: No thyromegaly.   Cardiovascular:      Rate and Rhythm: Normal rate.      Heart sounds: Normal heart sounds. No murmur heard.  Pulmonary:      Effort: Pulmonary effort is normal. No respiratory distress.      Breath sounds: Normal breath sounds.   Musculoskeletal:         General: No deformity.      Cervical back: Normal range of motion and neck supple.   Lymphadenopathy:      Cervical: No cervical adenopathy.   Skin:     General: Skin is warm and dry.   Neurological:      Mental " Status: She is alert.      Cranial Nerves: No cranial nerve deficit.   Psychiatric:         Behavior: Behavior normal.         Assessment:     1. Type 2 diabetes mellitus with stage 3a chronic kidney disease, with long-term current use of insulin    2. Uncontrolled type 2 diabetes mellitus with hyperglycemia, with long-term current use of insulin    3. Anxiety    4. Acute bronchitis, unspecified organism    5. Post herpetic neuralgia    6. Hypertension associated with diabetes    7. Coronary artery disease of native artery of native heart with stable angina pectoris    8. Chronic bronchitis, unspecified chronic bronchitis type    9. Stage 3b chronic kidney disease    10. Follicular lymphoma, unspecified follicular lymphoma type, unspecified body region    11. Chronic diastolic heart failure    12. Proliferative diabetic retinopathy of left eye associated with type 2 diabetes mellitus, unspecified proliferative retinopathy type    13. Aortic atherosclerosis      Plan:     Lorena was seen today for follow-up. Visit today included increased complexity associated with the care of the episodic problems addressed and managing the longitudinal care of the patient due to the serious and/or complex managed problem(s) as per assessment/plan.       Diagnoses and all orders for this visit:    Type 2 diabetes mellitus with stage 3a chronic kidney disease, with long-term current use of insulin  Proliferative diabetic retinopathy of left eye associated with type 2 diabetes mellitus, unspecified proliferative retinopathy type  Due to Trulicity backorder (several months) switch to semaglutide  Continue current insulin regimen, follow BG readings via CGM  -     semaglutide (OZEMPIC) 0.25 mg or 0.5 mg (2 mg/3 mL) pen injector; Inject 0.25 mg into the skin once weekly for 4 weeks, THEN increase to 0.5 mg once weekly thereafter  -     insulin glargine U-100, Lantus, (LANTUS SOLOSTAR U-100 INSULIN) 100 unit/mL (3 mL) InPn pen; Inject 10  Units into the skin every evening.    Anxiety  Limited benzodiazapine usage (every 3-6 month refill) for symptomatic acute anxiety  We discussed risks of dependence, oversedation  -     LORazepam (ATIVAN) 1 MG tablet; Take 1 tablet (1 mg total) by mouth 2 (two) times daily as needed for Anxiety.    Acute bronchitis, unspecified organism  Sent medication refill(s) to patient's preferred pharmacy on file.  -     albuterol (PROVENTIL/VENTOLIN HFA) 90 mcg/actuation inhaler; Inhale 2 puffs into the lungs every 6 (six) hours as needed for Wheezing or Shortness of Breath.    Post herpetic neuralgia  Shingles bout 12/2023 continued neuropathic pain of left flank  Trial topical lidocaine   -     LIDOcaine (LIDODERM) 5 %; Place 1 patch onto the skin once daily. Remove & Discard patch within 12 hours or as directed by MD.    Hypertension associated with diabetes  BP goal <130/80 per ADA guidelines  BP controlled presently - reviewed anti-hypertensive regimen - continue current therapy    Coronary artery disease of native artery of native heart with stable angina pectoris  Chronic diastolic heart failure  Switch atenolol to metoprolol presently   -     metoprolol succinate (TOPROL-XL) 25 MG 24 hr tablet; Take 1 tablet (25 mg total) by mouth once daily.    Stage 3b chronic kidney disease  Last eGFR 34  KFRE 5-Year: 16.4% at 3/6/2024 12:17 PM  Calculated from:  Serum Creatinine: 1.6 mg/dL at 3/6/2024 12:17 PM  Urine Albumin Creatinine Ratio: 921.7 ug/mg at 6/22/2023 12:05 PM  Age: 73 years  Sex: Female at 3/6/2024 12:17 PM  Has CKD-3 to CKD-5: Yes  Given Tangri score will refer to Nephrology    History of follicular lymphoma, unspecified follicular lymphoma type, unspecified body region  Follicular lymphoma diagnosed in late 2009. She was treated with chemotherapy (withour Rituximab due to allergy, unclear regimen) and achieved CR.     Aortic atherosclerosis  Noted on prior imaging. Stable, asymptomatic chronic condition.  Will  continue to maximize risk factor reduction and adjust medication as needed.     I spent 45 minutes reviewing the patient's chart, talking to family/caregiver(s), coordinating care with other providers/specialists and time spent on documentation       Health Maintenance reviewed, addressed as per orders      1. The patient indicates understanding of these issues and agrees with the plan. Brief care plan is updated and reviewed with the patient as applicable.   2. The patient is given an After Visit Summary that lists all medications with directions, allergies, orders placed during this encounter and follow-up instructions.   3. I have reviewed the patient's medical information including past medical, family, and social history sections including the medications and allergies.   4. We discussed the patient's current medications. I reconciled the patient's medication list and prepared and supplied needed refills.       Jorge Paris MD  Internal Medicine-Pediatrics

## 2024-05-29 ENCOUNTER — TELEPHONE (OUTPATIENT)
Dept: FAMILY MEDICINE | Facility: CLINIC | Age: 74
End: 2024-05-29
Payer: MEDICARE

## 2024-05-30 ENCOUNTER — PATIENT OUTREACH (OUTPATIENT)
Dept: ADMINISTRATIVE | Facility: HOSPITAL | Age: 74
End: 2024-05-30
Payer: MEDICARE

## 2024-05-30 ENCOUNTER — TELEPHONE (OUTPATIENT)
Dept: FAMILY MEDICINE | Facility: CLINIC | Age: 74
End: 2024-05-30
Payer: MEDICARE

## 2024-05-30 NOTE — TELEPHONE ENCOUNTER
----- Message from Lisa Ochoa sent at 5/30/2024  4:08 PM CDT -----  Regarding: Self [phone]  Type: Patient Call Back     Who called: Self     What is the request in detail: Pt returned call     Can the clinic reply by [facility]? No     Would the patient rather a call back or a response via [facility]? Call     Best call back number: [phone]      Additional Information:

## 2024-07-07 NOTE — ASSESSMENT & PLAN NOTE
July 7, 2024     Patient: Gerri Parkinson   YOB: 1977   Date of Visit: 7/7/2024       To Whom It May Concern:    It is my medical opinion that Gerri Parkinson may return to work on 07/09/2024 .    If you have any questions or concerns, please don't hesitate to call.         Sincerely,        Rex Carter PA-C    CC: No Recipients   Will continue home fluoxetine

## 2024-07-10 ENCOUNTER — LAB VISIT (OUTPATIENT)
Dept: LAB | Facility: HOSPITAL | Age: 74
End: 2024-07-10
Attending: HOSPITALIST
Payer: MEDICARE

## 2024-07-10 DIAGNOSIS — Z79.4 UNCONTROLLED TYPE 2 DIABETES MELLITUS WITH HYPERGLYCEMIA, WITH LONG-TERM CURRENT USE OF INSULIN: ICD-10-CM

## 2024-07-10 DIAGNOSIS — E11.9 TYPE 2 DIABETES MELLITUS WITHOUT COMPLICATION: ICD-10-CM

## 2024-07-10 DIAGNOSIS — E11.65 UNCONTROLLED TYPE 2 DIABETES MELLITUS WITH HYPERGLYCEMIA, WITH LONG-TERM CURRENT USE OF INSULIN: ICD-10-CM

## 2024-07-10 LAB
ANION GAP SERPL CALC-SCNC: 11 MMOL/L (ref 8–16)
BUN SERPL-MCNC: 29 MG/DL (ref 8–23)
CALCIUM SERPL-MCNC: 9.1 MG/DL (ref 8.7–10.5)
CHLORIDE SERPL-SCNC: 106 MMOL/L (ref 95–110)
CHOLEST SERPL-MCNC: 132 MG/DL (ref 120–199)
CHOLEST/HDLC SERPL: 3.1 {RATIO} (ref 2–5)
CO2 SERPL-SCNC: 21 MMOL/L (ref 23–29)
CREAT SERPL-MCNC: 1.8 MG/DL (ref 0.5–1.4)
EST. GFR  (NO RACE VARIABLE): 29.4 ML/MIN/1.73 M^2
ESTIMATED AVG GLUCOSE: 189 MG/DL (ref 68–131)
GLUCOSE SERPL-MCNC: 305 MG/DL (ref 70–110)
HBA1C MFR BLD: 8.2 % (ref 4–5.6)
HDLC SERPL-MCNC: 42 MG/DL (ref 40–75)
HDLC SERPL: 31.8 % (ref 20–50)
LDLC SERPL CALC-MCNC: 60.6 MG/DL (ref 63–159)
NONHDLC SERPL-MCNC: 90 MG/DL
POTASSIUM SERPL-SCNC: 4.2 MMOL/L (ref 3.5–5.1)
SODIUM SERPL-SCNC: 138 MMOL/L (ref 136–145)
TRIGL SERPL-MCNC: 147 MG/DL (ref 30–150)

## 2024-07-10 PROCEDURE — 80048 BASIC METABOLIC PNL TOTAL CA: CPT | Mod: HCNC | Performed by: HOSPITALIST

## 2024-07-10 PROCEDURE — 80061 LIPID PANEL: CPT | Mod: HCNC | Performed by: INTERNAL MEDICINE

## 2024-07-10 PROCEDURE — 36415 COLL VENOUS BLD VENIPUNCTURE: CPT | Mod: HCNC,PO | Performed by: HOSPITALIST

## 2024-07-10 PROCEDURE — 83036 HEMOGLOBIN GLYCOSYLATED A1C: CPT | Mod: HCNC | Performed by: HOSPITALIST

## 2024-07-17 ENCOUNTER — OFFICE VISIT (OUTPATIENT)
Dept: ENDOCRINOLOGY | Facility: CLINIC | Age: 74
End: 2024-07-17
Payer: MEDICARE

## 2024-07-17 VITALS
DIASTOLIC BLOOD PRESSURE: 84 MMHG | SYSTOLIC BLOOD PRESSURE: 122 MMHG | HEART RATE: 74 BPM | BODY MASS INDEX: 27.11 KG/M2 | WEIGHT: 183.63 LBS

## 2024-07-17 DIAGNOSIS — E11.3293 MILD NONPROLIFERATIVE DIABETIC RETINOPATHY OF BOTH EYES ASSOCIATED WITH TYPE 2 DIABETES MELLITUS, MACULAR EDEMA PRESENCE UNSPECIFIED: ICD-10-CM

## 2024-07-17 DIAGNOSIS — N18.32 CKD STAGE 3B, GFR 30-44 ML/MIN: ICD-10-CM

## 2024-07-17 DIAGNOSIS — E11.59 HYPERTENSION ASSOCIATED WITH DIABETES: Chronic | ICD-10-CM

## 2024-07-17 DIAGNOSIS — I15.2 HYPERTENSION ASSOCIATED WITH DIABETES: Chronic | ICD-10-CM

## 2024-07-17 DIAGNOSIS — N18.31 TYPE 2 DIABETES MELLITUS WITH STAGE 3A CHRONIC KIDNEY DISEASE, WITH LONG-TERM CURRENT USE OF INSULIN: ICD-10-CM

## 2024-07-17 DIAGNOSIS — E11.22 TYPE 2 DIABETES MELLITUS WITH STAGE 3A CHRONIC KIDNEY DISEASE, WITH LONG-TERM CURRENT USE OF INSULIN: ICD-10-CM

## 2024-07-17 DIAGNOSIS — I25.2 HISTORY OF MYOCARDIAL INFARCTION: ICD-10-CM

## 2024-07-17 DIAGNOSIS — Z79.4 TYPE 2 DIABETES MELLITUS WITH STAGE 3A CHRONIC KIDNEY DISEASE, WITH LONG-TERM CURRENT USE OF INSULIN: ICD-10-CM

## 2024-07-17 DIAGNOSIS — N18.31 STAGE 3A CHRONIC KIDNEY DISEASE: Chronic | ICD-10-CM

## 2024-07-17 DIAGNOSIS — Z79.4 UNCONTROLLED TYPE 2 DIABETES MELLITUS WITH HYPERGLYCEMIA, WITH LONG-TERM CURRENT USE OF INSULIN: Primary | ICD-10-CM

## 2024-07-17 DIAGNOSIS — E11.65 UNCONTROLLED TYPE 2 DIABETES MELLITUS WITH HYPERGLYCEMIA, WITH LONG-TERM CURRENT USE OF INSULIN: Primary | ICD-10-CM

## 2024-07-17 PROCEDURE — 99214 OFFICE O/P EST MOD 30 MIN: CPT | Mod: HCNC,S$GLB,, | Performed by: HOSPITALIST

## 2024-07-17 PROCEDURE — 99999 PR PBB SHADOW E&M-EST. PATIENT-LVL V: CPT | Mod: PBBFAC,HCNC,, | Performed by: HOSPITALIST

## 2024-07-17 PROCEDURE — 3008F BODY MASS INDEX DOCD: CPT | Mod: HCNC,CPTII,S$GLB, | Performed by: HOSPITALIST

## 2024-07-17 PROCEDURE — 1160F RVW MEDS BY RX/DR IN RCRD: CPT | Mod: HCNC,CPTII,S$GLB, | Performed by: HOSPITALIST

## 2024-07-17 PROCEDURE — 95251 CONT GLUC MNTR ANALYSIS I&R: CPT | Mod: HCNC,S$GLB,, | Performed by: HOSPITALIST

## 2024-07-17 PROCEDURE — 3079F DIAST BP 80-89 MM HG: CPT | Mod: HCNC,CPTII,S$GLB, | Performed by: HOSPITALIST

## 2024-07-17 PROCEDURE — 3062F POS MACROALBUMINURIA REV: CPT | Mod: HCNC,CPTII,S$GLB, | Performed by: HOSPITALIST

## 2024-07-17 PROCEDURE — 1101F PT FALLS ASSESS-DOCD LE1/YR: CPT | Mod: HCNC,CPTII,S$GLB, | Performed by: HOSPITALIST

## 2024-07-17 PROCEDURE — 3074F SYST BP LT 130 MM HG: CPT | Mod: HCNC,CPTII,S$GLB, | Performed by: HOSPITALIST

## 2024-07-17 PROCEDURE — 3288F FALL RISK ASSESSMENT DOCD: CPT | Mod: HCNC,CPTII,S$GLB, | Performed by: HOSPITALIST

## 2024-07-17 PROCEDURE — 3052F HG A1C>EQUAL 8.0%<EQUAL 9.0%: CPT | Mod: HCNC,CPTII,S$GLB, | Performed by: HOSPITALIST

## 2024-07-17 PROCEDURE — 3066F NEPHROPATHY DOC TX: CPT | Mod: HCNC,CPTII,S$GLB, | Performed by: HOSPITALIST

## 2024-07-17 PROCEDURE — 1159F MED LIST DOCD IN RCRD: CPT | Mod: HCNC,CPTII,S$GLB, | Performed by: HOSPITALIST

## 2024-07-17 RX ORDER — SEMAGLUTIDE 0.68 MG/ML
INJECTION, SOLUTION SUBCUTANEOUS
Qty: 3 ML | Refills: 6 | Status: SHIPPED | OUTPATIENT
Start: 2024-07-17

## 2024-07-17 RX ORDER — INSULIN GLARGINE 100 [IU]/ML
12 INJECTION, SOLUTION SUBCUTANEOUS NIGHTLY
Qty: 30 ML | Refills: 6 | Status: SHIPPED | OUTPATIENT
Start: 2024-07-17

## 2024-07-17 RX ORDER — SEMAGLUTIDE 1.34 MG/ML
1 INJECTION, SOLUTION SUBCUTANEOUS
Qty: 3 ML | Refills: 11 | Status: SHIPPED | OUTPATIENT
Start: 2024-07-17 | End: 2025-07-17

## 2024-07-17 RX ORDER — INSULIN ASPART 100 [IU]/ML
20 INJECTION, SOLUTION INTRAVENOUS; SUBCUTANEOUS
Qty: 30 ML | Refills: 8 | Status: SHIPPED | OUTPATIENT
Start: 2024-07-17

## 2024-07-17 RX ORDER — PEN NEEDLE, DIABETIC 30 GX3/16"
NEEDLE, DISPOSABLE MISCELLANEOUS
Qty: 150 EACH | Refills: 11 | Status: SHIPPED | OUTPATIENT
Start: 2024-07-17

## 2024-07-17 NOTE — PATIENT INSTRUCTIONS
Lantus 12 units nightly at 8 PM    Novolog: 10 units for breakfast, 20 units for lunch,  20 unit for dinner  SLIDING Scale as needed   Blood sugar 150 to 200, + 1 unit  Blood sugar 201 to 250, + 2 units  Blood sugar 251 to 300, + 3 units  Blood sugar 301 to 350, + 4 units   Blood sugar greater than 350, + 5 units    Ozempic 0.5 mg once a week for 4 weeks, monitor for symptoms: stomach issue  If no symptoms after 4 weeks,  okay to increase to 1.0 mg once a week injection

## 2024-07-17 NOTE — PROGRESS NOTES
"Subjective:      Patient ID: Lorena Contreras is a 73 y.o. female presented to [facility] on 7/17/2024.  Chief Complaint:  Diabetes    History of Present Illness: Lorena Contreras is a 73 y.o. female here for  type 2 diabetes  Other significant past medical history: CAD s/p MI 3/2019 s/p stent placement,  hypertension    Diabetes mellitus Type 2  - Known diabetic complications: retinopathy, peripheral neuropathy, cardiovascular disease, and cerebrovascular disease  - Diagnosed w/ DM: in >20 years  - Diabetes Education: Yes, many years ago, at [facility]  - Family history of diabetes: Yes  - Hx of pancreatitis/Diabetes Related Hospitalization:  No  - Patient  quite concerned about medication induced nausea and vomiting.  She attributes her symptoms to a lot of diabetes medication in the past  - Pt afraid to take insulin because she fears hypoglycemia.  Patient with symptoms of hypoglycemia less than 100  - 1st time seen by me 8/24/2023: Patient with chronic poorly-controlled type 2 diabetes, recent A1c 13%, previous 14%.  Previously seen by  Concepcion Zee NP for diabetes management.  Last office visit  10/2022, with patient not showing up to follow-up appointment. Patient was not happy at her last office visit due to drastic changes to her regimen  leading to to chronic nausea and vomiting once again. She attributes the as Tresiba the cause of her nausea .  Patient on Trulicity Finds that her BGs have improved with Trulicity, down from 400-500s to now 200s. But today, BG erick to 400s after eating yogurt and banana; no insulin yet today. Currently not on Dexcom G6 due to issues, patient inadvertently  threw out her transmitter  - overall patient is a very poor historian. Reports that she has a bad memory. When  discussing about nutrition and Diabetes Education," I know all about it"  - having issue getting Dexcom from [facility]      Interval history:  patient is here for follow-up type 2 " diabetes.  A1c Decrease 8.2%.  Patient was given Ozempic by PCP, current dose 0.25 d mg once a week injection   Denies any GI symptoms.  Did not increase the 0.5 as directed   Patient on MDI insulin.  Does have occasional lower glucose postprandial.  She also gives insulin without eating on occasion leading to occasional hypoglycemia with exertion   On Dexcom G7 CGM.  Which has helped prevent hypoglycemic events   Eating 1 meal a day due to appetite suppressant.      Current reported meds:    Novolo units with meals around lunch/dinner.  Does not eat breakfast  Lantus 10 units nightly at 8 PM  Ozempic 0.25 mg once a week    Compliant with insulin.  Rotating injection sites  Previous meds tried:              Metformin 1000 mg bid  Novolog 70/30 30 units BID ac   Glipizide dc'd d/t recurrent hypoglycemia; also had nausea   Victoza - severe nausea and abdominal pain   Started mixed analog insulin 2017  Trulicity stop due to shortages    CGM download and interpretation: Data was downloaded and personally reviewed by myself  CGM type: Dexcom G7  Number of Day CGM worn: d, percentage of time CGM is active: 86%  Average blood glucose: 238, Glucose variability:80, Glucose management indicator (GMI):  9.0%  Time in Range:  40% very high, 35% High, 24% Target Range, <1% low, <1% very low>> reviewed and discussed, goal TIR discussed with patient    Patient with hyperglycemia, average glucose 238.  With Time-in-range 40% very high, 35% high.  Only 24% in normal range.  Data show severe postprandial hyperglycemia along with glucose overnight being high suspect due to snacking.  No obvious hypoglycemia trend noted  Data and Recommendation discussed with patient in clinic today  >>See CGM data scan into our system, media tab<<        Diet/Exercise:   - CURRENT DIET: Doesn't generally eat breakfast. In the mornings, she usually just drinks coffee decaf.   Eats 2 meals/day, lunch 4 pm and dinner at 9 pm. First food  "intake is at 4 pm, no snacks until after dinner. Likes to eat 1/2 nutty butty after dinner.   Drinks unsweet tea, green tea without sugar.   - Weight trend: stable  - Occupation: not working  - Eye exam current (within one year): yes, DR:  yes  - Reports cuts or ulcers on feet:   Denies, has podiatrist  - Statin: Taking, ACE/ARB: Not taking    Diabetes lab work  Lab Results   Component Value Date    HGBA1C 8.2 (H) 07/10/2024    HGBA1C 8.7 (H) 03/06/2024    HGBA1C 9.3 (H) 11/29/2023    HGBA1C 8.9 (H) 11/04/2023     Chart review show longstanding history of poorly-controlled type 2 diabetes, with A1c above 8% for many years.  Recently A1C 13, 14      Lab Results   Component Value Date    CPEPTIDE 3.89 11/29/2023      No results found for: "FRUCTOSAMINE"  Lab Results   Component Value Date    MICALBCREAT 5156.3 (H) 07/10/2024     Lab Results   Component Value Date    ULBIHDJX64 1161 (H) 05/26/2022     Diabetes Management Status: Reviewed this office visit  Screening or Prevention Patient's value Goal Complete/Controlled?   Lipid profile : 07/10/2024 Annually Yes   Dilated retinal exam : 02/28/2024 Annually Yes   Foot exam   : 10/20/2022 Annually Yes      Reviewed past surgical, medical, family, social history and updated as appropriate.  Review of Systems: see HPI above  Objective:   /84   Pulse 74   Wt 83.3 kg (183 lb 9.6 oz)   BMI 27.11 kg/m²   Body mass index is 27.11 kg/m².  Vital signs reviewed    Physical Exam  Vitals and nursing note reviewed.   Constitutional:       Appearance: Normal appearance. She is well-developed. She is obese. She is not ill-appearing.   Neck:      Thyroid: No thyromegaly.   Pulmonary:      Effort: Pulmonary effort is normal. No respiratory distress.   Musculoskeletal:         General: Normal range of motion.      Cervical back: Normal range of motion.   Neurological:      General: No focal deficit present.      Mental Status: She is alert. Mental status is at baseline. " "  Psychiatric:         Mood and Affect: Mood normal.         Behavior: Behavior normal.     Lab Reviewed:  See results in subjective  Lab Results   Component Value Date    HGBA1C 8.2 (H) 07/10/2024     Lab Results   Component Value Date    CHOL 132 07/10/2024    HDL 42 07/10/2024    LDLCALC 60.6 (L) 07/10/2024    TRIG 147 07/10/2024    CHOLHDL 31.8 07/10/2024     Lab Results   Component Value Date     07/10/2024    K 4.2 07/10/2024     07/10/2024    CO2 21 (L) 07/10/2024     (H) 07/10/2024    BUN 29 (H) 07/10/2024    CREATININE 1.8 (H) 07/10/2024    CALCIUM 9.1 07/10/2024    PHOS 3.8 03/06/2024    PROT 7.2 11/03/2023    ALBUMIN 2.7 (L) 03/06/2024    BILITOT 0.4 11/03/2023    ALKPHOS 175 (H) 11/03/2023    AST 41 (H) 11/03/2023    ALT 53 (H) 11/03/2023    ANIONGAP 11 07/10/2024    EGFRNORACEVR 29.4 (A) 07/10/2024    TSH 0.602 04/10/2023    PTH 66.5 10/22/2021    OSWHBSCR93AD 87 08/23/2016     Assessment     1. Uncontrolled type 2 diabetes mellitus with hyperglycemia, with long-term current use of insulin  Ambulatory referral/consult to Nephrology    HEMOGLOBIN A1C    Basic Metabolic Panel    OZEMPIC 1 mg/dose (4 mg/3 mL)    LANTUS SOLOSTAR U-100 INSULIN 100 unit/mL (3 mL) InPn pen    NOVOLOG FLEXPEN U-100 INSULIN 100 unit/mL (3 mL) InPn pen    pen needle, diabetic (BD ULTRA-FINE SAGAR PEN NEEDLE) 32 gauge x 5/32" Ndle      2. CKD stage 3b, GFR 30-44 ml/min  Ambulatory referral/consult to Nephrology      3. Type 2 diabetes mellitus with stage 3a chronic kidney disease, with long-term current use of insulin  semaglutide (OZEMPIC) 0.25 mg or 0.5 mg (2 mg/3 mL) pen injector      4. Hypertension associated with diabetes        5. Stage 3a chronic kidney disease        6. Mild nonproliferative diabetic retinopathy of both eyes associated with type 2 diabetes mellitus, macular edema presence unspecified        7. History of myocardial infarction            Plan     Uncontrolled type 2 diabetes mellitus with " hyperglycemia, with long-term current use of insulin  - Diabetes is not at goal but improved, given most current A1C, Goal A1C for patient is 7%  - Complicated by hyperglycemia, Longstanding history of noncompliance to insulin regimen, poor insight into diabetes care  - Patient with fear of hypoglycemia, therefore leaving her glucose high  - Patient reports chronic nausea and vomiting  For which she attributed due to her diabetes medications, this has limited treatment option and compliance in the past.  Of note currently does not have any issues with her current regimen 2023  - Diabetes goal including glucose glucose and A1C goals discussed  - Discussed proper insulin injection technique: Including rotation of injection sites, using new insulin needles  - Diabetic supplies/medications were reviewed this visit to ensure continue steady supplies  - continue to benefit from Dexcom G7    Plan  - Continue encouragement of insulin compliance.  Currently patient seems motivated to lower her A1c.    - Increase Lantus 12 units nightly at 8 PM given overnight hyperglycemia  - Advised continue Novolo units for meals.  Currently not eating more than 2 meals a day  - SLIDING Scale given to patient as requiring a, ISF >150 ISF 1:50, discuss avoid insulin stacking leading to hypoglycemia.  She verbalized understanding  - Ozempic 0.5 mg once a week for 4 weeks, monitor for symptoms: stomach issue. If no symptoms after 4 weeks,  okay to increase to 1.0 mg once a week injection   - Encouragement of dietary modification, portion size control, decreasing carbohydrates intake  - continue to benefit from Dexcom G7 paperwork will be we sent to [facility]  - Follow up as scheduled, 3-4 months    Hypertension associated with diabetes  - blood pressure review in clinic today  - manage by PCP    Stage 3a chronic kidney disease  - Renal function reviewed on lab work today, stable   - Renally Adjust diabetes medication, avoid  hypoglycemia  - continue routine monitoring  - consider SGLT-2 Inh, consider nephrology referral    Mild nonproliferative diabetic retinopathy of both eyes associated with type 2 diabetes mellitus  -  continue routine  eye monitoring    History of myocardial infarction  -  continue to benefit from the use of GLP1      Advised patient to follow up with PCP for routine health maintenance care.   RTC in 3 months    Gerardo Barbour M.D.  Endocrinology  [facility]  7/17/2024      Disclaimer: This note has been generated in part with the use of voice-recognition software. There may be typographical errors that have been missed during proof-reading.    -------------------------------------------------------------------------------------------------  Indications for CGMs:    Patent with diagnosed: Diabetes Mellitus that required frequent glucose monitoring (4 + times a day and 4x/day testing), frequent adjustments to insulin regiment based on BG or CGM results. Patent requires a therapeutic CGM and is willing to use therapeutic CGM/SMBG for Necessary frequent adjustments in insulin therapy, patient demonstrated an understanding of the technology (can use and recognize alerts and alarms). I, the physician, have assessed adherence to CGM regimen and to the plan, patient will have close follow up in clinic as indicated, every 3 months to 6 months.     Patient experiences (including but not limited to):  [x]   Discrepancies between records and A1C, at risk severe hypoglycemia episode and hospitalization  []   Recurrent, unexplained, severe hypoglycemic episodes  [x]   Postprandial hyperglycemia  [x]   Unexplained blood glucose excursions  []   Impaired awareness of hypoglycemia    I believe that the patient will greatly benefit from wearing CGM because this will allow for better glucose control, help to avoid high/lows and prevent hospitalization.  This also is safer for patient in using CGM during  the COVID public health emergency.

## 2024-07-17 NOTE — ASSESSMENT & PLAN NOTE
- Diabetes is not at goal but improved, given most current A1C, Goal A1C for patient is 7%  - Complicated by hyperglycemia, Longstanding history of noncompliance to insulin regimen, poor insight into diabetes care  - Patient with fear of hypoglycemia, therefore leaving her glucose high  - Patient reports chronic nausea and vomiting  For which she attributed due to her diabetes medications, this has limited treatment option and compliance in the past.  Of note currently does not have any issues with her current regimen 2023  - Diabetes goal including glucose glucose and A1C goals discussed  - Discussed proper insulin injection technique: Including rotation of injection sites, using new insulin needles  - Diabetic supplies/medications were reviewed this visit to ensure continue steady supplies  - continue to benefit from Dexcom G7    Plan  - Continue encouragement of insulin compliance.  Currently patient seems motivated to lower her A1c.    - Increase Lantus 12 units nightly at 8 PM given overnight hyperglycemia  - Advised continue Novolo units for meals.  Currently not eating more than 2 meals a day  - SLIDING Scale given to patient as requiring a, ISF >150 ISF 1:50, discuss avoid insulin stacking leading to hypoglycemia.  She verbalized understanding  - Ozempic 0.5 mg once a week for 4 weeks, monitor for symptoms: stomach issue. If no symptoms after 4 weeks,  okay to increase to 1.0 mg once a week injection   - Encouragement of dietary modification, portion size control, decreasing carbohydrates intake  - continue to benefit from Dexcom G7 paperwork will be we sent to [facility]  - Follow up as scheduled, 3-4 months

## 2024-08-08 NOTE — Clinical Note
Catheter is inserted into the left ventricle. The vessel(s) were injected and visualized. Hemodynamics performed.  Pt already notified of result  See Mychart message  Please repeat around 16 weeks to see if there is a better yield of feta DNA

## 2024-08-13 ENCOUNTER — HOSPITAL ENCOUNTER (INPATIENT)
Facility: HOSPITAL | Age: 74
LOS: 7 days | Discharge: NURSING, REHAB OR LONG-TERM CARE | DRG: 492 | End: 2024-08-21
Attending: EMERGENCY MEDICINE | Admitting: INTERNAL MEDICINE
Payer: MEDICARE

## 2024-08-13 DIAGNOSIS — E11.22 TYPE 2 DIABETES MELLITUS WITH STAGE 3A CHRONIC KIDNEY DISEASE, WITH LONG-TERM CURRENT USE OF INSULIN: Chronic | ICD-10-CM

## 2024-08-13 DIAGNOSIS — S82.871A: ICD-10-CM
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
## 2024-08-13 DIAGNOSIS — Z91.89 AT RISK FOR PROLONGED QT INTERVAL SYNDROME: ICD-10-CM

## 2024-08-13 DIAGNOSIS — N18.31 TYPE 2 DIABETES MELLITUS WITH STAGE 3A CHRONIC KIDNEY DISEASE, WITH LONG-TERM CURRENT USE OF INSULIN: Chronic | ICD-10-CM

## 2024-08-13 DIAGNOSIS — E11.65 UNCONTROLLED TYPE 2 DIABETES MELLITUS WITH HYPERGLYCEMIA, WITH LONG-TERM CURRENT USE OF INSULIN: ICD-10-CM

## 2024-08-13 DIAGNOSIS — S32.491A OTHER CLOSED FRACTURE OF RIGHT ACETABULUM, INITIAL ENCOUNTER: ICD-10-CM

## 2024-08-13 DIAGNOSIS — S82.851D CLOSED TRIMALLEOLAR FRACTURE OF RIGHT ANKLE WITH ROUTINE HEALING: ICD-10-CM

## 2024-08-13 DIAGNOSIS — Z79.4 TYPE 2 DIABETES MELLITUS WITH STAGE 3A CHRONIC KIDNEY DISEASE, WITH LONG-TERM CURRENT USE OF INSULIN: Chronic | ICD-10-CM

## 2024-08-13 DIAGNOSIS — R74.01 TRANSAMINITIS: ICD-10-CM

## 2024-08-13 DIAGNOSIS — Z79.4 UNCONTROLLED TYPE 2 DIABETES MELLITUS WITH HYPERGLYCEMIA, WITH LONG-TERM CURRENT USE OF INSULIN: ICD-10-CM

## 2024-08-13 DIAGNOSIS — S32.9XXA CLOSED NONDISPLACED FRACTURE OF PELVIS, UNSPECIFIED PART OF PELVIS, INITIAL ENCOUNTER: Primary | ICD-10-CM

## 2024-08-13 DIAGNOSIS — I25.118 CORONARY ARTERY DISEASE OF NATIVE ARTERY OF NATIVE HEART WITH STABLE ANGINA PECTORIS: ICD-10-CM

## 2024-08-13 DIAGNOSIS — R07.9 CHEST PAIN: ICD-10-CM

## 2024-08-13 DIAGNOSIS — S82.891A CLOSED FRACTURE OF RIGHT ANKLE, INITIAL ENCOUNTER: ICD-10-CM

## 2024-08-13 LAB
ALBUMIN SERPL BCP-MCNC: 2.6 G/DL (ref 3.5–5.2)
ALP SERPL-CCNC: 205 U/L (ref 55–135)
ALT SERPL W/O P-5'-P-CCNC: 45 U/L (ref 10–44)
ANION GAP SERPL CALC-SCNC: 9 MMOL/L (ref 8–16)
APAP SERPL-MCNC: <3 UG/ML (ref 10–20)
AST SERPL-CCNC: 50 U/L (ref 10–40)
BASOPHILS # BLD AUTO: 0.05 K/UL (ref 0–0.2)
BASOPHILS NFR BLD: 0.5 % (ref 0–1.9)
BILIRUB SERPL-MCNC: 0.5 MG/DL (ref 0.1–1)
BNP SERPL-MCNC: 536 PG/ML (ref 0–99)
BUN SERPL-MCNC: 29 MG/DL (ref 8–23)
CALCIUM SERPL-MCNC: 8.7 MG/DL (ref 8.7–10.5)
CHLORIDE SERPL-SCNC: 107 MMOL/L (ref 95–110)
CO2 SERPL-SCNC: 19 MMOL/L (ref 23–29)
CREAT SERPL-MCNC: 1.9 MG/DL (ref 0.5–1.4)
DIFFERENTIAL METHOD BLD: ABNORMAL
EOSINOPHIL # BLD AUTO: 0.1 K/UL (ref 0–0.5)
EOSINOPHIL NFR BLD: 1.3 % (ref 0–8)
ERYTHROCYTE [DISTWIDTH] IN BLOOD BY AUTOMATED COUNT: 14.2 % (ref 11.5–14.5)
EST. GFR  (NO RACE VARIABLE): 28 ML/MIN/1.73 M^2
ETHANOL SERPL-MCNC: <10 MG/DL
GLUCOSE SERPL-MCNC: 348 MG/DL (ref 70–110)
HCT VFR BLD AUTO: 33.2 % (ref 37–48.5)
HGB BLD-MCNC: 10.9 G/DL (ref 12–16)
IMM GRANULOCYTES # BLD AUTO: 0.06 K/UL (ref 0–0.04)
IMM GRANULOCYTES NFR BLD AUTO: 0.6 % (ref 0–0.5)
LEFT EYE DM RETINOPATHY: POSITIVE
LYMPHOCYTES # BLD AUTO: 1 K/UL (ref 1–4.8)
LYMPHOCYTES NFR BLD: 9.2 % (ref 18–48)
MCH RBC QN AUTO: 27.8 PG (ref 27–31)
MCHC RBC AUTO-ENTMCNC: 32.8 G/DL (ref 32–36)
MCV RBC AUTO: 85 FL (ref 82–98)
MONOCYTES # BLD AUTO: 0.6 K/UL (ref 0.3–1)
MONOCYTES NFR BLD: 6.1 % (ref 4–15)
NEUTROPHILS # BLD AUTO: 8.6 K/UL (ref 1.8–7.7)
NEUTROPHILS NFR BLD: 82.3 % (ref 38–73)
NRBC BLD-RTO: 0 /100 WBC
PLATELET # BLD AUTO: 168 K/UL (ref 150–450)
PMV BLD AUTO: 13.3 FL (ref 9.2–12.9)
POTASSIUM SERPL-SCNC: 4.7 MMOL/L (ref 3.5–5.1)
PROT SERPL-MCNC: 6.1 G/DL (ref 6–8.4)
RBC # BLD AUTO: 3.92 M/UL (ref 4–5.4)
RIGHT EYE DM RETINOPATHY: POSITIVE
SODIUM SERPL-SCNC: 135 MMOL/L (ref 136–145)
TROPONIN I SERPL DL<=0.01 NG/ML-MCNC: 0.01 NG/ML (ref 0–0.03)
TROPONIN I SERPL DL<=0.01 NG/ML-MCNC: 0.02 NG/ML (ref 0–0.03)
WBC # BLD AUTO: 10.49 K/UL (ref 3.9–12.7)

## 2024-08-13 PROCEDURE — 25000003 PHARM REV CODE 250: Mod: HCNC | Performed by: EMERGENCY MEDICINE

## 2024-08-13 PROCEDURE — 83880 ASSAY OF NATRIURETIC PEPTIDE: CPT | Mod: HCNC | Performed by: EMERGENCY MEDICINE

## 2024-08-13 PROCEDURE — 82077 ASSAY SPEC XCP UR&BREATH IA: CPT | Mod: HCNC | Performed by: EMERGENCY MEDICINE

## 2024-08-13 PROCEDURE — 63600175 PHARM REV CODE 636 W HCPCS: Mod: HCNC | Performed by: EMERGENCY MEDICINE

## 2024-08-13 PROCEDURE — 96374 THER/PROPH/DIAG INJ IV PUSH: CPT | Mod: HCNC

## 2024-08-13 PROCEDURE — 80143 DRUG ASSAY ACETAMINOPHEN: CPT | Mod: HCNC | Performed by: EMERGENCY MEDICINE

## 2024-08-13 PROCEDURE — 80053 COMPREHEN METABOLIC PANEL: CPT | Mod: HCNC | Performed by: EMERGENCY MEDICINE

## 2024-08-13 PROCEDURE — 96376 TX/PRO/DX INJ SAME DRUG ADON: CPT | Mod: HCNC

## 2024-08-13 PROCEDURE — 99285 EMERGENCY DEPT VISIT HI MDM: CPT | Mod: 25,HCNC

## 2024-08-13 PROCEDURE — 84484 ASSAY OF TROPONIN QUANT: CPT | Mod: HCNC | Performed by: EMERGENCY MEDICINE

## 2024-08-13 PROCEDURE — 93005 ELECTROCARDIOGRAM TRACING: CPT | Mod: HCNC

## 2024-08-13 PROCEDURE — 85025 COMPLETE CBC W/AUTO DIFF WBC: CPT | Mod: HCNC | Performed by: EMERGENCY MEDICINE

## 2024-08-13 PROCEDURE — 93010 ELECTROCARDIOGRAM REPORT: CPT | Mod: HCNC,,, | Performed by: INTERNAL MEDICINE

## 2024-08-13 PROCEDURE — 96375 TX/PRO/DX INJ NEW DRUG ADDON: CPT | Mod: HCNC

## 2024-08-13 RX ORDER — MORPHINE SULFATE 4 MG/ML
4 INJECTION, SOLUTION INTRAMUSCULAR; INTRAVENOUS
Status: COMPLETED | OUTPATIENT
Start: 2024-08-13 | End: 2024-08-13

## 2024-08-13 RX ORDER — ACETAMINOPHEN 10 MG/ML
1000 INJECTION, SOLUTION INTRAVENOUS ONCE
Status: COMPLETED | OUTPATIENT
Start: 2024-08-13 | End: 2024-08-13

## 2024-08-13 RX ORDER — HYDRALAZINE HYDROCHLORIDE 20 MG/ML
10 INJECTION INTRAMUSCULAR; INTRAVENOUS
Status: COMPLETED | OUTPATIENT
Start: 2024-08-13 | End: 2024-08-13

## 2024-08-13 RX ORDER — LORAZEPAM 0.5 MG/1
1 TABLET ORAL
Status: COMPLETED | OUTPATIENT
Start: 2024-08-13 | End: 2024-08-13

## 2024-08-13 RX ORDER — OXYCODONE AND ACETAMINOPHEN 5; 325 MG/1; MG/1
1 TABLET ORAL
Status: COMPLETED | OUTPATIENT
Start: 2024-08-13 | End: 2024-08-13

## 2024-08-13 RX ORDER — HYDROMORPHONE HYDROCHLORIDE 1 MG/ML
0.5 INJECTION, SOLUTION INTRAMUSCULAR; INTRAVENOUS; SUBCUTANEOUS
Status: DISCONTINUED | OUTPATIENT
Start: 2024-08-13 | End: 2024-08-13

## 2024-08-13 RX ADMIN — ACETAMINOPHEN 1000 MG: 10 INJECTION INTRAVENOUS at 09:08

## 2024-08-13 RX ADMIN — HYDRALAZINE HYDROCHLORIDE 10 MG: 20 INJECTION INTRAMUSCULAR; INTRAVENOUS at 05:08

## 2024-08-13 RX ADMIN — MORPHINE SULFATE 4 MG: 4 INJECTION, SOLUTION INTRAMUSCULAR; INTRAVENOUS at 11:08

## 2024-08-13 RX ADMIN — MORPHINE SULFATE 4 MG: 4 INJECTION, SOLUTION INTRAMUSCULAR; INTRAVENOUS at 05:08

## 2024-08-13 RX ADMIN — LORAZEPAM 1 MG: 0.5 TABLET ORAL at 06:08

## 2024-08-13 RX ADMIN — OXYCODONE HYDROCHLORIDE AND ACETAMINOPHEN 1 TABLET: 5; 325 TABLET ORAL at 05:08

## 2024-08-13 NOTE — ED NOTES
Pt reports to ED after fall this evening at grocery store, c/o right ankle, knee and hip pain. Pt denies hitting head, denies LOC. AAOx4.

## 2024-08-13 NOTE — ED PROVIDER NOTES
"Encounter Date: 8/13/2024    SCRIBE #1 NOTE: I, Gunnar Chase Do, am scribing for, and in the presence of,  Darlyn Munroe MD. I have scribed the following portions of the note - the EKG reading. Other sections scribed: HPI, ROS, PE.       History     Chief Complaint   Patient presents with    Fall     Arrived via EMS, c/o right hip pain, secondary to a fall from standing. Pt denies any LOC or any other pain. Pt reports falling due to twisting her ankle and losing balance.      Lorena Contreras is a 73 y.o. female, with a pertinent PMHx of T2DM, fibromyalgia, HTN, HLD, stroke, MI, and CAD, who presents to the ED via EMS s/p a mechanical fall onset 30 minutes ago. Patient reports walking to a department store when she fell and twisted her right ankle. Patient reports associated right hip pain, right ankle pain, right knee pain, and right ankle swelling. She notes chest pain described as "pressure" en route following fentanyl administration. She denies head trauma. No other exacerbating or alleviating factors. Patient denies fever, nausea, emesis, or other associated symptoms.     Per chart review 01/08/20  Left heart cath, right radial    The history is provided by the patient. No  was used.     Review of patient's allergies indicates:   Allergen Reactions    Novolin 70/30 (semi-synthetic) Nausea And Vomiting     Severe vomiting on Relion 70/30    Sulfa (sulfonamide antibiotics) Anaphylaxis    Talwin [pentazocine lactate] Anaphylaxis    Victoza [liraglutide] Nausea And Vomiting    Glipizide Nausea Only    Citric acid     Codeine     Influenza virus vaccines Hives    Iodine and iodide containing products Hives    Levetiracetam Itching    Rituxan [rituximab] Hives    Zoloft [sertraline] Nausea And Vomiting     Past Medical History:   Diagnosis Date    Allergy     Altered mental status 06/19/2022    DYSARTHRIA, SPASTIC MOVEMENTS & DIFFICULTY SWALLOWING    Anemia     Anxiety     Arthritis     " Cataract     both removed    Colon polyps     Coronary artery disease     Depression     Diabetes mellitus, type II     Disorder of kidney and ureter     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/2023    Fibromyalgia     Follicular lymphoma     GERD (gastroesophageal reflux disease)     HTN (hypertension)     Hyperlipidemia     MI (myocardial infarction) 03/2019    Personal history of colonic polyps     Restless leg syndrome     Stroke      Past Surgical History:   Procedure Laterality Date    COLONOSCOPY  11/07/2012    Colon polyp found; repeat in 5 years    ELBOW SURGERY Right 2015    dislocation repair     ESOPHAGOGASTRODUODENOSCOPY  11/07/2012    atrophic gastritis, H pylori testing negative    INCISION AND DRAINAGE FOOT Right 6/2/2021    Procedure: INCISION AND DRAINAGE, FOOT, bone biopsy;  Surgeon: Quiana Penn DPM;  Location: [facility] OR;  Service: Podiatry;  Laterality: Right;    KNEE SURGERY Bilateral 2015    scoped    LEFT HEART CATHETERIZATION Left 3/29/2019    Procedure: Left heart cath;  Surgeon: Bladimir Barbosa MD;  Location: [facility] CATH LAB;  Service: Cardiology;  Laterality: Left;    LEFT HEART CATHETERIZATION Left 11/18/2019    Procedure: Left heart cath;  Surgeon: Karl Rico MD;  Location: [facility] CATH LAB;  Service: Cardiology;  Laterality: Left;    LEFT HEART CATHETERIZATION Left 1/8/2020    Procedure: Left heart cath, right radial, noon start;  Surgeon: Christos Monreal MD;  Location: [facility] CATH LAB;  Service: Cardiology;  Laterality: Left;  RN Pre Op 1-6-20.  To be admitted 1-7-20 sor Aspirin Disensitation    TONSILLECTOMY  1955    ULTRASOUND GUIDANCE  1/8/2020    Procedure: Ultrasound Guidance;  Surgeon: Christos Monreal MD;  Location: [facility] CATH LAB;  Service: Cardiology;;     Family History   Problem Relation Name Age of Onset    Cancer Mother          colon    Heart disease Mother      Cancer Father          lung    Lung cancer Brother      Diabetes Sister      Hypertension Sister      Allergy  (severe) Daughter erin     No Known Problems Daughter      Stroke Neg Hx      Hyperlipidemia Neg Hx       Social History     Tobacco Use    Smoking status: Never    Smokeless tobacco: Never   Substance Use Topics    Alcohol use: Not Currently    Drug use: Never     Review of Systems   Constitutional:  Negative for fever.   HENT:  Negative for facial swelling and voice change.    Eyes:  Negative for pain.   Respiratory:  Negative for choking and shortness of breath.    Cardiovascular:  Positive for chest pain.   Gastrointestinal:  Negative for abdominal pain, nausea and vomiting.   Genitourinary:  Negative for dysuria and frequency.   Musculoskeletal:  Positive for arthralgias and joint swelling. Negative for back pain.   Skin:  Negative for rash.   Neurological:  Negative for weakness and numbness.   Psychiatric/Behavioral:  Negative for self-injury.        Physical Exam     Initial Vitals [08/13/24 1530]   BP Pulse Resp Temp SpO2   (!) 146/82 104 16 98.2 °F (36.8 °C) 100 %      MAP       --         Physical Exam    Nursing note and vitals reviewed.  Constitutional: She is not diaphoretic. She appears distressed.   HENT:   Head: Normocephalic and atraumatic.   Protecting airway   Eyes: Conjunctivae and EOM are normal. No scleral icterus.   Neck: Neck supple. No tracheal deviation present.   No midline vertebral tenderness or step-off   Normal range of motion.  Cardiovascular:  Normal rate, regular rhythm and intact distal pulses.           Pulmonary/Chest: No stridor. No respiratory distress. She exhibits no tenderness.   Speaking in full sentences   Abdominal: Abdomen is soft. She exhibits no distension. There is no abdominal tenderness. There is no rebound and no guarding.   Musculoskeletal:      Cervical back: Normal range of motion and neck supple.      Comments: There is right ankle edema with pain with attempted range of motion. Right knee and right hip tenderness noted. No midline vertebral tenderness or  step-offs.      Neurological: She is alert. She has normal strength. No cranial nerve deficit or sensory deficit.   Skin: Skin is warm and dry.   Psychiatric: She has a normal mood and affect.         ED Course   Procedures  Labs Reviewed   CBC W/ AUTO DIFFERENTIAL - Abnormal       Result Value    WBC 10.49      RBC 3.92 (*)     Hemoglobin 10.9 (*)     Hematocrit 33.2 (*)     MCV 85      MCH 27.8      MCHC 32.8      RDW 14.2      Platelets 168      MPV 13.3 (*)     Immature Granulocytes 0.6 (*)     Gran # (ANC) 8.6 (*)     Immature Grans (Abs) 0.06 (*)     Lymph # 1.0      Mono # 0.6      Eos # 0.1      Baso # 0.05      nRBC 0      Gran % 82.3 (*)     Lymph % 9.2 (*)     Mono % 6.1      Eosinophil % 1.3      Basophil % 0.5      Differential Method Automated     COMPREHENSIVE METABOLIC PANEL - Abnormal    Sodium 135 (*)     Potassium 4.7      Chloride 107      CO2 19 (*)     Glucose 348 (*)     BUN 29 (*)     Creatinine 1.9 (*)     Calcium 8.7      Total Protein 6.1      Albumin 2.6 (*)     Total Bilirubin 0.5      Alkaline Phosphatase 205 (*)     AST 50 (*)     ALT 45 (*)     eGFR 28 (*)     Anion Gap 9     B-TYPE NATRIURETIC PEPTIDE - Abnormal     (*)    ACETAMINOPHEN LEVEL - Abnormal    Acetaminophen (Tylenol), Serum <3.0 (*)    TROPONIN I    Troponin I 0.016     TROPONIN I    Troponin I 0.013     ALCOHOL,MEDICAL (ETHANOL)    Alcohol, Serum <10     URINALYSIS, REFLEX TO URINE CULTURE   DRUG SCREEN PANEL, URINE EMERGENCY     EKG Readings: (Independently Interpreted)   Initial Reading: No STEMI. Heart Rate: 91. Ectopy: No Ectopy. Conduction: Normal. ST Segments: Normal ST Segments. T Waves: Normal. Axis: Normal.       Imaging Results              CT Pelvis Without Contrast (Final result)  Result time 08/13/24 20:58:12      Final result by Quiana Chawla MD (08/13/24 20:58:12)                   Impression:      Acute traumatic fractures involving the right iliac wing, the anterior pillar of the right  acetabulum and the lateral most aspect of the right superior ramus, and the right inferior pubic ramus.  No hip dislocation.      Electronically signed by: Quiana Chawla  Date:    08/13/2024  Time:    20:58               Narrative:    EXAMINATION:  CT PELVIS WITHOUT CONTRAST    CLINICAL HISTORY:  Pelvic trauma;    TECHNIQUE:  1.25 mm axial images were obtained through the pelvis from above the iliac crest through the upper femoral shafts.    COMPARISON:  Plain hip radiograph 08/13/2020 full    FINDINGS:  There is a minimally displaced fracture of the right iliac wing.  There are comminuted fractures involving the anterior pillar of the right acetabulum and the lateral most aspect of the right superior ramus.  There is comminuted and overlapping fracture of the right inferior pubic ramus.  The hips are not dislocated.  The SI joints are intact.  There are degenerative changes seen at the sacroiliac joints and the pubic symphysis.  Bones are osteopenic.  Incidental note is made of vascular calcifications and a small fat containing umbilical hernia.                                       CT Lumbar Spine Without Contrast (Final result)  Result time 08/13/24 20:31:47      Final result by Quiana Chawla MD (08/13/24 20:31:47)                   Impression:      No acute lumbar fracture.  Multilevel degenerative change greatest at L4/L5.    Small bilateral pleural effusions right greater than left.    Gallbladder sludge or gravel-like gallstones.      Electronically signed by: Quiana Chawla  Date:    08/13/2024  Time:    20:31               Narrative:    EXAMINATION:  CT OF THE LUMBAR SPINE WITHOUT    CLINICAL HISTORY:  Complaining of right hip pain secondary to fall from standing.    TECHNIQUE:  1.25 mm axial images were obtained through the lumbar spine. Coronal and sagittal reformatted images were provided.    COMPARISON:  None.    FINDINGS:  There is no lumbar vertebral body fracture.  There is grade 1  anterolisthesis of L4 on L5.  At L3/L4, there is moderate central canal narrowing secondary to the facet arthrosis and ligamentum flavum hypertrophy without significant foraminal stenosis.  At L4/L5, there is no stone significant central canal narrowing, but there is bilateral mild foraminal narrowing.  Secondary to ligamentum flavum hypertrophy and facet arthrosis.  At L5/S1, there is a broad-based disc bulge without any significant central canal stenosis or foraminal stenosis.  There is small marginal osteophytes throughout.  Vacuum phenomena is seen at L4/L5 with mild intervertebral disc space narrowing.  Incidental note is made of small bilateral pleural effusions right greater than left and gallbladder sludge or gravel-like gallstones.                                       X-Ray Chest AP Portable (Final result)  Result time 08/13/24 17:49:32      Final result by Eddie Montilla MD (08/13/24 17:49:32)                   Impression:      1. Interstitial findings may reflect edema versus chronic change, no large focal consolidation.      Electronically signed by: Eddie Montilla MD  Date:    08/13/2024  Time:    17:49               Narrative:    EXAMINATION:  XR CHEST AP PORTABLE    CLINICAL HISTORY:  Chest Pain;    TECHNIQUE:  Single frontal view of the chest was performed.    COMPARISON:  11/03/2023    FINDINGS:  The cardiomediastinal silhouette is prominent, magnified by technique, similar to the previous exam noting calcification of the aorta..  There is no pleural effusion.  The trachea is midline.  The lungs are symmetrically expanded bilaterally with coarse interstitial attenuation in a central hilar distribution..  No large focal consolidation seen.  There is no pneumothorax.  The osseous structures are remarkable for degenerative changes..                                       X-Ray Ankle Complete Right (Final result)  Result time 08/13/24 17:50:39      Final result by Eddie Montilla MD (08/13/24  17:50:39)                   Impression:      1. Distal tibial and fibular fractures as described.      Electronically signed by: Eddie Montilla MD  Date:    08/13/2024  Time:    17:50               Narrative:    EXAMINATION:  XR ANKLE COMPLETE 3 VIEW RIGHT    CLINICAL HISTORY:  Unspecified fall, initial encounter    TECHNIQUE:  AP, lateral, and oblique images of the right ankle were performed.    COMPARISON:  Radiograph of the foot 06/01/2021    FINDINGS:  Three views right ankle.    There is osteopenia.  There is acute appearing fracture involving the distal aspect of the fibula.  There is comminuted fracture of the medial malleolus.  The ankle mortise is intact.  There is edema about the ankle.  The posterior aspect of the tibia appears intact.  There are degenerative changes of the calcaneus.  There is vascular calcification.                                       X-Ray Hip 2 or 3 views Right with Pelvis when performed (Final result)  Result time 08/13/24 17:53:13      Final result by Eddie Montilla MD (08/13/24 17:53:13)                   Impression:      1. Fractures of the right inferior and superior pubic rami.  2. There is cortical irregularity involving the right iliac wing, concerning for additional fracture.      Electronically signed by: Eddie Montilla MD  Date:    08/13/2024  Time:    17:53               Narrative:    EXAMINATION:  XR HIP WITH PELVIS WHEN PERFORMED 2 OR 3 VIEWS RIGHT    CLINICAL HISTORY:  Unspecified fall, initial encounter    TECHNIQUE:  AP view of the pelvis and frog leg lateral view of the right hip were performed.    COMPARISON:  None    FINDINGS:  Three views right hip.    The bilateral sacroiliac joints are intact.  The pubic symphysis is intact.  There is osteopenia.  There are fractures of the right superior and inferior pubic rami.  The bilateral femoroacetabular joints are intact noting degenerative change.  There is fracture involving the right iliac wing.                                        X-Ray Knee 1 or 2 View Right (Final result)  Result time 08/13/24 17:53:54   Procedure changed from X-Ray Knee 3 View Right     Final result by Eddie Montilla MD (08/13/24 17:53:54)                   Impression:      1. No acute displaced fracture or dislocation of the knee.      Electronically signed by: Eddie Montilla MD  Date:    08/13/2024  Time:    17:53               Narrative:    EXAMINATION:  XR KNEE 1 OR 2 VIEW RIGHT    CLINICAL HISTORY:  Pain;  Unspecified fall, initial encounter    TECHNIQUE:  AP and lateral views of the right knee were performed.    COMPARISON:  11/20/2014    FINDINGS:  Two views right knee.    There is osteopenia.  There are degenerative changes of the knee.  There is vascular calcification.  No large right knee joint effusion.                                       Medications   oxyCODONE-acetaminophen 5-325 mg per tablet 1 tablet (1 tablet Oral Given 8/13/24 1700)   morphine injection 4 mg (4 mg Intravenous Given 8/13/24 1725)   hydrALAZINE injection 10 mg (10 mg Intravenous Given 8/13/24 1724)   LORazepam tablet 1 mg (1 mg Oral Given 8/13/24 1839)   acetaminophen 1,000 mg/100 mL (10 mg/mL) injection 1,000 mg (0 mg Intravenous Stopped 8/13/24 2212)   morphine injection 4 mg (4 mg Intravenous Given 8/13/24 2309)     Medical Decision Making  Differential diagnosis include but are not limited to: Contusion, fracture, dislocation, sprain, strain.    Patient is afebrile but is in painful distress at time history and physical.  She has no evidence of head trauma.  She has a GCS of 15.  She has no obvious focal neurological deficits.  She also notes chest pain that began after fentanyl administration with EMS.  Denies any symptoms preceding her fall.  CBC without leukocytosis or critical anemia.  Chemistry with mild hyponatremia, hyperglycemia, renal insufficiency.  Patient does not have anion gap to suggest DKA.  Renal function is comparable to prior.  BNP is  elevated at 536.  Patient is not hypoxic, tachypneic or in respiratory distress.  Chest x-ray is equivocal for pulmonary edema versus scarring.  Initial troponin is within normal ranges.  Repeat troponin remains within normal ranges.  X-ray of the ankle is significant for fracture.  X-ray of the knee does not show fracture or dislocation.  X-ray of the hip does not show hip fracture with does show pelvic irregularity concerning for fracture.  Patient is sent for CT of the pelvis which does note pelvic fracture.  CT of the lumbar spine without acute fracture. Consult: I have spoken with Dr. Ely from the Orthopedic surgery service regarding the patient's presentation and study results.  At this time, the recommendation is patient has an unstable pelvic fracture as well as an unstable ankle fracture and will benefit most from surgical intervention to repair both.  Recommends transfer to [facility] for Orthopedics evaluation by ortho [facility] who perform fixation of pelvic fractures.  Case discussed with Dr. Toscano of [facility] who recommends ED to ED transfer for Orthopedics evaluation.  Patient expresses understanding and agreement with transfer.    Amount and/or Complexity of Data Reviewed  Labs: ordered. Decision-making details documented in ED Course.  Radiology: ordered. Decision-making details documented in ED Course.  ECG/medicine tests: ordered and independent interpretation performed. Decision-making details documented in ED Course.    Risk  Prescription drug management.    Critical Care  Total time providing critical care: 60 minutes            Scribe Attestation:   Scribe #1: I performed the above scribed service and the documentation accurately describes the services I performed. I attest to the accuracy of the note.                               Clinical Impression:  Final diagnoses:  [R07.9] Chest pain  [W19.XXXA] Fall  [S32.9XXA] Closed nondisplaced fracture of pelvis, unspecified part  of pelvis, initial encounter (Primary)  [L13.983E] Closed fracture of right ankle, initial encounter       I, Darlyn Munroe , personally performed the services described in this documentation. All medical record entries made by the scribe were at my direction and in my presence. I have reviewed the chart and agree that the record reflects my personal performance and is accurate and complete.      DISCLAIMER: This note was prepared with [employer] voice recognition transcription software. Garbled syntax, mangled pronouns, and other bizarre constructions may be attributed to that software system.     ED Disposition Condition    Transfer to Another Facility Darlyn Gillis MD  08/13/24 0872

## 2024-08-14 ENCOUNTER — ANESTHESIA EVENT (OUTPATIENT)
Dept: SURGERY | Facility: HOSPITAL | Age: 74
DRG: 492 | End: 2024-08-14
Payer: MEDICARE

## 2024-08-14 ENCOUNTER — ANESTHESIA (OUTPATIENT)
Dept: SURGERY | Facility: HOSPITAL | Age: 74
DRG: 492 | End: 2024-08-14
Payer: MEDICARE

## 2024-08-14 PROBLEM — S82.201A CLOSED FRACTURE OF RIGHT TIBIA AND FIBULA: Status: RESOLVED | Noted: 2024-08-14 | Resolved: 2024-08-14

## 2024-08-14 PROBLEM — S82.201A CLOSED FRACTURE OF RIGHT TIBIA AND FIBULA: Status: ACTIVE | Noted: 2024-08-14

## 2024-08-14 PROBLEM — S82.851D CLOSED TRIMALLEOLAR FRACTURE OF RIGHT ANKLE WITH ROUTINE HEALING: Status: ACTIVE | Noted: 2024-08-14

## 2024-08-14 PROBLEM — D63.8 ANEMIA OF CHRONIC DISEASE: Status: ACTIVE | Noted: 2024-08-14

## 2024-08-14 PROBLEM — R74.01 TRANSAMINITIS: Status: ACTIVE | Noted: 2024-08-14

## 2024-08-14 PROBLEM — S32.511A: Status: ACTIVE | Noted: 2024-08-14

## 2024-08-14 PROBLEM — S82.401A CLOSED FRACTURE OF RIGHT TIBIA AND FIBULA: Status: RESOLVED | Noted: 2024-08-14 | Resolved: 2024-08-14

## 2024-08-14 PROBLEM — S32.301A CLOSED FRACTURE OF RIGHT ILIAC WING: Status: ACTIVE | Noted: 2024-08-14

## 2024-08-14 PROBLEM — S32.414A CLOSED NONDISPLACED FRACTURE OF ANTERIOR WALL OF RIGHT ACETABULUM: Status: ACTIVE | Noted: 2024-08-14

## 2024-08-14 PROBLEM — S32.82XA MULTIPLE FRACTURES OF PELVIS: Status: ACTIVE | Noted: 2024-08-14

## 2024-08-14 PROBLEM — S32.409A FRACTURE OF ACETABULUM: Status: ACTIVE | Noted: 2024-08-14

## 2024-08-14 PROBLEM — S32.434A CLOSED NONDISPLACED FRACTURE OF ANTERIOR COLUMN OF RIGHT ACETABULUM: Status: ACTIVE | Noted: 2024-08-14

## 2024-08-14 PROBLEM — D62 ACUTE BLOOD LOSS ANEMIA: Status: ACTIVE | Noted: 2024-08-14

## 2024-08-14 PROBLEM — S82.401A CLOSED FRACTURE OF RIGHT TIBIA AND FIBULA: Status: ACTIVE | Noted: 2024-08-14

## 2024-08-14 LAB
ALBUMIN SERPL BCP-MCNC: 2.4 G/DL (ref 3.5–5.2)
ALP SERPL-CCNC: 188 U/L (ref 55–135)
ALT SERPL W/O P-5'-P-CCNC: 39 U/L (ref 10–44)
AMPHET+METHAMPHET UR QL: NEGATIVE
ANION GAP SERPL CALC-SCNC: 12 MMOL/L (ref 8–16)
AST SERPL-CCNC: 44 U/L (ref 10–40)
BACTERIA #/AREA URNS AUTO: ABNORMAL /HPF
BARBITURATES UR QL SCN>200 NG/ML: NEGATIVE
BASOPHILS # BLD AUTO: 0.07 K/UL (ref 0–0.2)
BASOPHILS NFR BLD: 0.6 % (ref 0–1.9)
BENZODIAZ UR QL SCN>200 NG/ML: NEGATIVE
BILIRUB SERPL-MCNC: 0.5 MG/DL (ref 0.1–1)
BILIRUB UR QL STRIP: NEGATIVE
BUN SERPL-MCNC: 32 MG/DL (ref 8–23)
BZE UR QL SCN: NEGATIVE
CALCIUM SERPL-MCNC: 8.6 MG/DL (ref 8.7–10.5)
CANNABINOIDS UR QL SCN: NEGATIVE
CHLORIDE SERPL-SCNC: 106 MMOL/L (ref 95–110)
CLARITY UR REFRACT.AUTO: ABNORMAL
CO2 SERPL-SCNC: 17 MMOL/L (ref 23–29)
COLOR UR AUTO: YELLOW
CREAT SERPL-MCNC: 1.9 MG/DL (ref 0.5–1.4)
CREAT UR-MCNC: 94 MG/DL (ref 15–325)
DIFFERENTIAL METHOD BLD: ABNORMAL
EOSINOPHIL # BLD AUTO: 0 K/UL (ref 0–0.5)
EOSINOPHIL NFR BLD: 0.4 % (ref 0–8)
ERYTHROCYTE [DISTWIDTH] IN BLOOD BY AUTOMATED COUNT: 14.8 % (ref 11.5–14.5)
EST. GFR  (NO RACE VARIABLE): 27.5 ML/MIN/1.73 M^2
GLUCOSE SERPL-MCNC: 279 MG/DL (ref 70–110)
GLUCOSE UR QL STRIP: ABNORMAL
HCT VFR BLD AUTO: 30.9 % (ref 37–48.5)
HCV AB SERPL QL IA: NORMAL
HGB BLD-MCNC: 9.9 G/DL (ref 12–16)
HGB UR QL STRIP: ABNORMAL
HIV 1+2 AB+HIV1 P24 AG SERPL QL IA: NORMAL
HYALINE CASTS UR QL AUTO: 1 /LPF
IMM GRANULOCYTES # BLD AUTO: 0.04 K/UL (ref 0–0.04)
IMM GRANULOCYTES NFR BLD AUTO: 0.4 % (ref 0–0.5)
KETONES UR QL STRIP: NEGATIVE
LEUKOCYTE ESTERASE UR QL STRIP: ABNORMAL
LYMPHOCYTES # BLD AUTO: 1.6 K/UL (ref 1–4.8)
LYMPHOCYTES NFR BLD: 14.2 % (ref 18–48)
MAGNESIUM SERPL-MCNC: 1.5 MG/DL (ref 1.6–2.6)
MCH RBC QN AUTO: 27.3 PG (ref 27–31)
MCHC RBC AUTO-ENTMCNC: 32 G/DL (ref 32–36)
MCV RBC AUTO: 85 FL (ref 82–98)
METHADONE UR QL SCN>300 NG/ML: NEGATIVE
MICROSCOPIC COMMENT: ABNORMAL
MONOCYTES # BLD AUTO: 0.9 K/UL (ref 0.3–1)
MONOCYTES NFR BLD: 8.3 % (ref 4–15)
NEUTROPHILS # BLD AUTO: 8.6 K/UL (ref 1.8–7.7)
NEUTROPHILS NFR BLD: 76.1 % (ref 38–73)
NITRITE UR QL STRIP: NEGATIVE
NRBC BLD-RTO: 0 /100 WBC
OHS QRS DURATION: 74 MS
OHS QTC CALCULATION: 504 MS
OPIATES UR QL SCN: ABNORMAL
PCP UR QL SCN>25 NG/ML: NEGATIVE
PH UR STRIP: 6 [PH] (ref 5–8)
PHOSPHATE SERPL-MCNC: 3.6 MG/DL (ref 2.7–4.5)
PLATELET # BLD AUTO: 175 K/UL (ref 150–450)
PMV BLD AUTO: 13.4 FL (ref 9.2–12.9)
POCT GLUCOSE: 212 MG/DL (ref 70–110)
POCT GLUCOSE: 285 MG/DL (ref 70–110)
POCT GLUCOSE: 313 MG/DL (ref 70–110)
POTASSIUM SERPL-SCNC: 4.5 MMOL/L (ref 3.5–5.1)
PROT SERPL-MCNC: 6.1 G/DL (ref 6–8.4)
PROT UR QL STRIP: ABNORMAL
RBC # BLD AUTO: 3.63 M/UL (ref 4–5.4)
RBC #/AREA URNS AUTO: 2 /HPF (ref 0–4)
SODIUM SERPL-SCNC: 135 MMOL/L (ref 136–145)
SP GR UR STRIP: 1.02 (ref 1–1.03)
SQUAMOUS #/AREA URNS AUTO: 0 /HPF
TOXICOLOGY INFORMATION: ABNORMAL
URN SPEC COLLECT METH UR: ABNORMAL
WBC # BLD AUTO: 11.26 K/UL (ref 3.9–12.7)
WBC #/AREA URNS AUTO: 34 /HPF (ref 0–5)
YEAST UR QL AUTO: ABNORMAL

## 2024-08-14 PROCEDURE — 64450 NJX AA&/STRD OTHER PN/BRANCH: CPT | Mod: 59,HCNC,RT, | Performed by: ANESTHESIOLOGY

## 2024-08-14 PROCEDURE — 36000710: Mod: HCNC | Performed by: ORTHOPAEDIC SURGERY

## 2024-08-14 PROCEDURE — 84100 ASSAY OF PHOSPHORUS: CPT | Mod: HCNC

## 2024-08-14 PROCEDURE — 27201423 OPTIME MED/SURG SUP & DEVICES STERILE SUPPLY: Mod: HCNC | Performed by: ORTHOPAEDIC SURGERY

## 2024-08-14 PROCEDURE — 25000003 PHARM REV CODE 250: Mod: HCNC

## 2024-08-14 PROCEDURE — 64447 NJX AA&/STRD FEMORAL NRV IMG: CPT | Mod: HCNC

## 2024-08-14 PROCEDURE — 0QSG04Z REPOSITION RIGHT TIBIA WITH INTERNAL FIXATION DEVICE, OPEN APPROACH: ICD-10-PCS | Performed by: ORTHOPAEDIC SURGERY

## 2024-08-14 PROCEDURE — 82962 GLUCOSE BLOOD TEST: CPT | Mod: HCNC | Performed by: ORTHOPAEDIC SURGERY

## 2024-08-14 PROCEDURE — 83735 ASSAY OF MAGNESIUM: CPT | Mod: HCNC

## 2024-08-14 PROCEDURE — 63600175 PHARM REV CODE 636 W HCPCS: Mod: HCNC | Performed by: INTERNAL MEDICINE

## 2024-08-14 PROCEDURE — 85025 COMPLETE CBC W/AUTO DIFF WBC: CPT | Mod: HCNC

## 2024-08-14 PROCEDURE — C1713 ANCHOR/SCREW BN/BN,TIS/BN: HCPCS | Mod: HCNC | Performed by: ORTHOPAEDIC SURGERY

## 2024-08-14 PROCEDURE — 37000009 HC ANESTHESIA EA ADD 15 MINS: Mod: HCNC | Performed by: ORTHOPAEDIC SURGERY

## 2024-08-14 PROCEDURE — 87077 CULTURE AEROBIC IDENTIFY: CPT | Mod: HCNC | Performed by: EMERGENCY MEDICINE

## 2024-08-14 PROCEDURE — 80307 DRUG TEST PRSMV CHEM ANLYZR: CPT | Mod: HCNC | Performed by: EMERGENCY MEDICINE

## 2024-08-14 PROCEDURE — 63600175 PHARM REV CODE 636 W HCPCS: Mod: HCNC | Performed by: NURSE ANESTHETIST, CERTIFIED REGISTERED

## 2024-08-14 PROCEDURE — 71000015 HC POSTOP RECOV 1ST HR: Mod: HCNC | Performed by: ORTHOPAEDIC SURGERY

## 2024-08-14 PROCEDURE — 0QSJ04Z REPOSITION RIGHT FIBULA WITH INTERNAL FIXATION DEVICE, OPEN APPROACH: ICD-10-PCS | Performed by: ORTHOPAEDIC SURGERY

## 2024-08-14 PROCEDURE — 25000003 PHARM REV CODE 250: Mod: HCNC | Performed by: NURSE ANESTHETIST, CERTIFIED REGISTERED

## 2024-08-14 PROCEDURE — 64445 NJX AA&/STRD SCIATIC NRV IMG: CPT | Mod: HCNC

## 2024-08-14 PROCEDURE — 36000711: Mod: HCNC | Performed by: ORTHOPAEDIC SURGERY

## 2024-08-14 PROCEDURE — 63600175 PHARM REV CODE 636 W HCPCS: Mod: HCNC

## 2024-08-14 PROCEDURE — 81001 URINALYSIS AUTO W/SCOPE: CPT | Mod: HCNC | Performed by: EMERGENCY MEDICINE

## 2024-08-14 PROCEDURE — 87088 URINE BACTERIA CULTURE: CPT | Mod: HCNC | Performed by: EMERGENCY MEDICINE

## 2024-08-14 PROCEDURE — 21400001 HC TELEMETRY ROOM: Mod: HCNC

## 2024-08-14 PROCEDURE — C1769 GUIDE WIRE: HCPCS | Mod: HCNC | Performed by: ORTHOPAEDIC SURGERY

## 2024-08-14 PROCEDURE — 25000003 PHARM REV CODE 250: Mod: HCNC | Performed by: ANESTHESIOLOGY

## 2024-08-14 PROCEDURE — 37000008 HC ANESTHESIA 1ST 15 MINUTES: Mod: HCNC | Performed by: ORTHOPAEDIC SURGERY

## 2024-08-14 PROCEDURE — 63600175 PHARM REV CODE 636 W HCPCS: Mod: HCNC | Performed by: EMERGENCY MEDICINE

## 2024-08-14 PROCEDURE — 99223 1ST HOSP IP/OBS HIGH 75: CPT | Mod: HCNC,57,, | Performed by: ORTHOPAEDIC SURGERY

## 2024-08-14 PROCEDURE — 25000003 PHARM REV CODE 250: Mod: HCNC | Performed by: INTERNAL MEDICINE

## 2024-08-14 PROCEDURE — 27828 TREAT LOWER LEG FRACTURE: CPT | Mod: HCNC,RT,, | Performed by: ORTHOPAEDIC SURGERY

## 2024-08-14 PROCEDURE — 71000033 HC RECOVERY, INTIAL HOUR: Mod: HCNC | Performed by: ORTHOPAEDIC SURGERY

## 2024-08-14 PROCEDURE — 71000016 HC POSTOP RECOV ADDL HR: Mod: HCNC | Performed by: ORTHOPAEDIC SURGERY

## 2024-08-14 PROCEDURE — 87086 URINE CULTURE/COLONY COUNT: CPT | Mod: HCNC | Performed by: EMERGENCY MEDICINE

## 2024-08-14 PROCEDURE — 63600175 PHARM REV CODE 636 W HCPCS: Mod: HCNC | Performed by: ANESTHESIOLOGY

## 2024-08-14 PROCEDURE — 86803 HEPATITIS C AB TEST: CPT | Mod: HCNC | Performed by: PHYSICIAN ASSISTANT

## 2024-08-14 PROCEDURE — 76942 ECHO GUIDE FOR BIOPSY: CPT | Mod: 26,HCNC,, | Performed by: ANESTHESIOLOGY

## 2024-08-14 PROCEDURE — 87389 HIV-1 AG W/HIV-1&-2 AB AG IA: CPT | Mod: HCNC | Performed by: PHYSICIAN ASSISTANT

## 2024-08-14 PROCEDURE — 63600175 PHARM REV CODE 636 W HCPCS: Mod: HCNC | Performed by: ORTHOPAEDIC SURGERY

## 2024-08-14 PROCEDURE — 87186 SC STD MICRODIL/AGAR DIL: CPT | Mod: HCNC | Performed by: EMERGENCY MEDICINE

## 2024-08-14 PROCEDURE — C1889 IMPLANT/INSERT DEVICE, NOC: HCPCS | Mod: HCNC | Performed by: ORTHOPAEDIC SURGERY

## 2024-08-14 PROCEDURE — 80053 COMPREHEN METABOLIC PANEL: CPT | Mod: HCNC

## 2024-08-14 PROCEDURE — 63600175 PHARM REV CODE 636 W HCPCS: Mod: JZ,JG,HCNC | Performed by: ANESTHESIOLOGY

## 2024-08-14 PROCEDURE — 96376 TX/PRO/DX INJ SAME DRUG ADON: CPT | Mod: HCNC

## 2024-08-14 DEVICE — IMPLANTABLE DEVICE: Type: IMPLANTABLE DEVICE | Site: ANKLE | Status: FUNCTIONAL

## 2024-08-14 DEVICE — SCREW BONE NON LOCK 3.5X36MM: Type: IMPLANTABLE DEVICE | Site: ANKLE | Status: FUNCTIONAL

## 2024-08-14 DEVICE — SCREW CORT SELF TAP 3.5X120MM: Type: IMPLANTABLE DEVICE | Site: ANKLE | Status: FUNCTIONAL

## 2024-08-14 DEVICE — SCREW BONE NON LOCK 3.5X30MM: Type: IMPLANTABLE DEVICE | Site: ANKLE | Status: FUNCTIONAL

## 2024-08-14 DEVICE — SCREW BONE NON LOCK 3.5X28MM: Type: IMPLANTABLE DEVICE | Site: ANKLE | Status: FUNCTIONAL

## 2024-08-14 DEVICE — BONE CHIP CANC 1.7-10MM 15ML: Type: IMPLANTABLE DEVICE | Site: ANKLE | Status: FUNCTIONAL

## 2024-08-14 DEVICE — SCREW BONE NON LOCK 3.5X34MM: Type: IMPLANTABLE DEVICE | Site: ANKLE | Status: FUNCTIONAL

## 2024-08-14 RX ORDER — POLYETHYLENE GLYCOL 3350 17 G/17G
17 POWDER, FOR SOLUTION ORAL DAILY PRN
Status: DISCONTINUED | OUTPATIENT
Start: 2024-08-14 | End: 2024-08-21 | Stop reason: HOSPADM

## 2024-08-14 RX ORDER — ISOSORBIDE MONONITRATE 30 MG/1
30 TABLET, EXTENDED RELEASE ORAL DAILY
Status: DISCONTINUED | OUTPATIENT
Start: 2024-08-14 | End: 2024-08-18

## 2024-08-14 RX ORDER — LIDOCAINE HYDROCHLORIDE 10 MG/ML
20 INJECTION, SOLUTION INFILTRATION; PERINEURAL ONCE
Status: COMPLETED | OUTPATIENT
Start: 2024-08-14 | End: 2024-08-14

## 2024-08-14 RX ORDER — BUPIVACAINE HYDROCHLORIDE 7.5 MG/ML
INJECTION, SOLUTION EPIDURAL; RETROBULBAR
Status: COMPLETED | OUTPATIENT
Start: 2024-08-14 | End: 2024-08-14

## 2024-08-14 RX ORDER — IBUPROFEN 200 MG
16 TABLET ORAL
Status: DISCONTINUED | OUTPATIENT
Start: 2024-08-14 | End: 2024-08-21 | Stop reason: HOSPADM

## 2024-08-14 RX ORDER — MORPHINE SULFATE 4 MG/ML
4 INJECTION, SOLUTION INTRAMUSCULAR; INTRAVENOUS
Status: COMPLETED | OUTPATIENT
Start: 2024-08-14 | End: 2024-08-14

## 2024-08-14 RX ORDER — ONDANSETRON HYDROCHLORIDE 2 MG/ML
INJECTION, SOLUTION INTRAVENOUS
Status: DISCONTINUED | OUTPATIENT
Start: 2024-08-14 | End: 2024-08-14

## 2024-08-14 RX ORDER — BISACODYL 10 MG/1
10 SUPPOSITORY RECTAL DAILY PRN
Status: DISCONTINUED | OUTPATIENT
Start: 2024-08-14 | End: 2024-08-21 | Stop reason: HOSPADM

## 2024-08-14 RX ORDER — ACETAMINOPHEN 500 MG
1000 TABLET ORAL EVERY 8 HOURS
Status: DISCONTINUED | OUTPATIENT
Start: 2024-08-14 | End: 2024-08-20

## 2024-08-14 RX ORDER — FENTANYL CITRATE 50 UG/ML
25-200 INJECTION, SOLUTION INTRAMUSCULAR; INTRAVENOUS
Status: DISCONTINUED | OUTPATIENT
Start: 2024-08-14 | End: 2024-08-14 | Stop reason: HOSPADM

## 2024-08-14 RX ORDER — INSULIN ASPART 100 [IU]/ML
0-5 INJECTION, SOLUTION INTRAVENOUS; SUBCUTANEOUS EVERY 6 HOURS PRN
Status: DISCONTINUED | OUTPATIENT
Start: 2024-08-14 | End: 2024-08-16

## 2024-08-14 RX ORDER — MAGNESIUM SULFATE HEPTAHYDRATE 40 MG/ML
2 INJECTION, SOLUTION INTRAVENOUS ONCE
Status: DISCONTINUED | OUTPATIENT
Start: 2024-08-14 | End: 2024-08-14

## 2024-08-14 RX ORDER — DIPHENHYDRAMINE HCL 25 MG
25 CAPSULE ORAL EVERY 6 HOURS PRN
Status: DISCONTINUED | OUTPATIENT
Start: 2024-08-14 | End: 2024-08-17

## 2024-08-14 RX ORDER — MAGNESIUM SULFATE HEPTAHYDRATE 40 MG/ML
2 INJECTION, SOLUTION INTRAVENOUS ONCE
Status: COMPLETED | OUTPATIENT
Start: 2024-08-14 | End: 2024-08-14

## 2024-08-14 RX ORDER — OXYCODONE HYDROCHLORIDE 10 MG/1
10 TABLET ORAL EVERY 6 HOURS PRN
Status: DISCONTINUED | OUTPATIENT
Start: 2024-08-14 | End: 2024-08-21 | Stop reason: HOSPADM

## 2024-08-14 RX ORDER — TALC
6 POWDER (GRAM) TOPICAL NIGHTLY PRN
Status: DISCONTINUED | OUTPATIENT
Start: 2024-08-14 | End: 2024-08-18

## 2024-08-14 RX ORDER — IBUPROFEN 200 MG
24 TABLET ORAL
Status: DISCONTINUED | OUTPATIENT
Start: 2024-08-14 | End: 2024-08-21 | Stop reason: HOSPADM

## 2024-08-14 RX ORDER — PROMETHAZINE HYDROCHLORIDE 12.5 MG/1
25 TABLET ORAL EVERY 6 HOURS PRN
Status: DISCONTINUED | OUTPATIENT
Start: 2024-08-14 | End: 2024-08-21 | Stop reason: HOSPADM

## 2024-08-14 RX ORDER — METHOCARBAMOL 500 MG/1
500 TABLET, FILM COATED ORAL 4 TIMES DAILY
Status: DISCONTINUED | OUTPATIENT
Start: 2024-08-14 | End: 2024-08-21 | Stop reason: HOSPADM

## 2024-08-14 RX ORDER — MORPHINE SULFATE 2 MG/ML
2 INJECTION, SOLUTION INTRAMUSCULAR; INTRAVENOUS EVERY 6 HOURS PRN
Status: DISCONTINUED | OUTPATIENT
Start: 2024-08-14 | End: 2024-08-20

## 2024-08-14 RX ORDER — PHENYLEPHRINE HYDROCHLORIDE 10 MG/ML
INJECTION INTRAVENOUS
Status: DISCONTINUED | OUTPATIENT
Start: 2024-08-14 | End: 2024-08-14

## 2024-08-14 RX ORDER — LIDOCAINE HYDROCHLORIDE 20 MG/ML
INJECTION INTRAVENOUS
Status: DISCONTINUED | OUTPATIENT
Start: 2024-08-14 | End: 2024-08-14

## 2024-08-14 RX ORDER — OXYCODONE HYDROCHLORIDE 5 MG/1
5 TABLET ORAL
Status: DISCONTINUED | OUTPATIENT
Start: 2024-08-14 | End: 2024-08-14

## 2024-08-14 RX ORDER — OXYCODONE HYDROCHLORIDE 5 MG/1
5 TABLET ORAL EVERY 6 HOURS PRN
Status: DISCONTINUED | OUTPATIENT
Start: 2024-08-14 | End: 2024-08-21 | Stop reason: HOSPADM

## 2024-08-14 RX ORDER — TRANEXAMIC ACID 100 MG/ML
INJECTION, SOLUTION INTRAVENOUS
Status: DISCONTINUED | OUTPATIENT
Start: 2024-08-14 | End: 2024-08-14

## 2024-08-14 RX ORDER — GABAPENTIN 300 MG/1
300 CAPSULE ORAL 3 TIMES DAILY
Status: DISCONTINUED | OUTPATIENT
Start: 2024-08-14 | End: 2024-08-15

## 2024-08-14 RX ORDER — MIDAZOLAM HYDROCHLORIDE 1 MG/ML
.5-4 INJECTION, SOLUTION INTRAMUSCULAR; INTRAVENOUS
Status: DISCONTINUED | OUTPATIENT
Start: 2024-08-14 | End: 2024-08-14 | Stop reason: HOSPADM

## 2024-08-14 RX ORDER — METOPROLOL SUCCINATE 25 MG/1
25 TABLET, EXTENDED RELEASE ORAL DAILY
Status: DISCONTINUED | OUTPATIENT
Start: 2024-08-14 | End: 2024-08-21 | Stop reason: HOSPADM

## 2024-08-14 RX ORDER — ACETAMINOPHEN 325 MG/1
650 TABLET ORAL EVERY 4 HOURS PRN
Status: DISCONTINUED | OUTPATIENT
Start: 2024-08-14 | End: 2024-08-14

## 2024-08-14 RX ORDER — VANCOMYCIN HYDROCHLORIDE 1 G/20ML
INJECTION, POWDER, LYOPHILIZED, FOR SOLUTION INTRAVENOUS
Status: DISCONTINUED | OUTPATIENT
Start: 2024-08-14 | End: 2024-08-14 | Stop reason: HOSPADM

## 2024-08-14 RX ORDER — ATORVASTATIN CALCIUM 40 MG/1
80 TABLET, FILM COATED ORAL NIGHTLY
Status: DISCONTINUED | OUTPATIENT
Start: 2024-08-14 | End: 2024-08-14

## 2024-08-14 RX ORDER — IPRATROPIUM BROMIDE AND ALBUTEROL SULFATE 2.5; .5 MG/3ML; MG/3ML
3 SOLUTION RESPIRATORY (INHALATION) EVERY 4 HOURS PRN
Status: DISCONTINUED | OUTPATIENT
Start: 2024-08-14 | End: 2024-08-21 | Stop reason: HOSPADM

## 2024-08-14 RX ORDER — ROCURONIUM BROMIDE 10 MG/ML
INJECTION, SOLUTION INTRAVENOUS
Status: DISCONTINUED | OUTPATIENT
Start: 2024-08-14 | End: 2024-08-14

## 2024-08-14 RX ORDER — INSULIN GLARGINE 100 [IU]/ML
10 INJECTION, SOLUTION SUBCUTANEOUS DAILY
Status: DISCONTINUED | OUTPATIENT
Start: 2024-08-14 | End: 2024-08-15

## 2024-08-14 RX ORDER — TOBRAMYCIN 1.2 G/30ML
INJECTION, POWDER, LYOPHILIZED, FOR SOLUTION INTRAVENOUS
Status: DISCONTINUED | OUTPATIENT
Start: 2024-08-14 | End: 2024-08-14 | Stop reason: HOSPADM

## 2024-08-14 RX ORDER — GLUCAGON 1 MG
1 KIT INJECTION
Status: DISCONTINUED | OUTPATIENT
Start: 2024-08-14 | End: 2024-08-14 | Stop reason: HOSPADM

## 2024-08-14 RX ORDER — MUPIROCIN 20 MG/G
OINTMENT TOPICAL
Status: DISCONTINUED | OUTPATIENT
Start: 2024-08-14 | End: 2024-08-14 | Stop reason: HOSPADM

## 2024-08-14 RX ORDER — FLUOXETINE HYDROCHLORIDE 20 MG/1
40 CAPSULE ORAL DAILY
Status: DISCONTINUED | OUTPATIENT
Start: 2024-08-14 | End: 2024-08-21 | Stop reason: HOSPADM

## 2024-08-14 RX ORDER — HEPARIN SODIUM 5000 [USP'U]/ML
5000 INJECTION, SOLUTION INTRAVENOUS; SUBCUTANEOUS EVERY 8 HOURS
Status: DISCONTINUED | OUTPATIENT
Start: 2024-08-14 | End: 2024-08-17

## 2024-08-14 RX ORDER — FENTANYL CITRATE 50 UG/ML
25 INJECTION, SOLUTION INTRAMUSCULAR; INTRAVENOUS EVERY 5 MIN PRN
Status: COMPLETED | OUTPATIENT
Start: 2024-08-14 | End: 2024-08-14

## 2024-08-14 RX ORDER — ONDANSETRON 8 MG/1
8 TABLET, ORALLY DISINTEGRATING ORAL EVERY 8 HOURS PRN
Status: DISCONTINUED | OUTPATIENT
Start: 2024-08-14 | End: 2024-08-21 | Stop reason: HOSPADM

## 2024-08-14 RX ORDER — PANTOPRAZOLE SODIUM 40 MG/1
40 TABLET, DELAYED RELEASE ORAL DAILY
Status: DISCONTINUED | OUTPATIENT
Start: 2024-08-14 | End: 2024-08-21 | Stop reason: HOSPADM

## 2024-08-14 RX ORDER — GLUCAGON 1 MG
1 KIT INJECTION
Status: DISCONTINUED | OUTPATIENT
Start: 2024-08-14 | End: 2024-08-21 | Stop reason: HOSPADM

## 2024-08-14 RX ORDER — FENTANYL CITRATE 50 UG/ML
INJECTION, SOLUTION INTRAMUSCULAR; INTRAVENOUS
Status: DISCONTINUED | OUTPATIENT
Start: 2024-08-14 | End: 2024-08-14

## 2024-08-14 RX ORDER — HYDRALAZINE HYDROCHLORIDE 25 MG/1
50 TABLET, FILM COATED ORAL EVERY 8 HOURS
Status: DISCONTINUED | OUTPATIENT
Start: 2024-08-14 | End: 2024-08-14

## 2024-08-14 RX ORDER — SODIUM CHLORIDE 0.9 % (FLUSH) 0.9 %
10 SYRINGE (ML) INJECTION
Status: DISCONTINUED | OUTPATIENT
Start: 2024-08-14 | End: 2024-08-14 | Stop reason: HOSPADM

## 2024-08-14 RX ORDER — SODIUM CHLORIDE 0.9 % (FLUSH) 0.9 %
10 SYRINGE (ML) INJECTION
Status: DISCONTINUED | OUTPATIENT
Start: 2024-08-14 | End: 2024-08-21 | Stop reason: HOSPADM

## 2024-08-14 RX ORDER — SODIUM BICARBONATE 650 MG/1
650 TABLET ORAL 2 TIMES DAILY
Status: DISCONTINUED | OUTPATIENT
Start: 2024-08-14 | End: 2024-08-21 | Stop reason: HOSPADM

## 2024-08-14 RX ORDER — PROPOFOL 10 MG/ML
VIAL (ML) INTRAVENOUS
Status: DISCONTINUED | OUTPATIENT
Start: 2024-08-14 | End: 2024-08-14

## 2024-08-14 RX ADMIN — SODIUM BICARBONATE 650 MG TABLET 650 MG: at 09:08

## 2024-08-14 RX ADMIN — PROMETHAZINE HYDROCHLORIDE 25 MG: 12.5 TABLET ORAL at 03:08

## 2024-08-14 RX ADMIN — LIDOCAINE HYDROCHLORIDE 100 MG: 20 INJECTION INTRAVENOUS at 11:08

## 2024-08-14 RX ADMIN — ACETAMINOPHEN 500 MG: 325 TABLET ORAL at 03:08

## 2024-08-14 RX ADMIN — SODIUM CHLORIDE: 0.9 INJECTION, SOLUTION INTRAVENOUS at 11:08

## 2024-08-14 RX ADMIN — TRANEXAMIC ACID 1000 MG: 100 INJECTION INTRAVENOUS at 02:08

## 2024-08-14 RX ADMIN — BUPIVACAINE HYDROCHLORIDE 15 ML: 7.5 INJECTION, SOLUTION EPIDURAL; RETROBULBAR at 11:08

## 2024-08-14 RX ADMIN — CEFAZOLIN 2 G: 2 INJECTION, POWDER, FOR SOLUTION INTRAMUSCULAR; INTRAVENOUS at 11:08

## 2024-08-14 RX ADMIN — MAGNESIUM SULFATE HEPTAHYDRATE 2 G: 40 INJECTION, SOLUTION INTRAVENOUS at 07:08

## 2024-08-14 RX ADMIN — OXYCODONE HYDROCHLORIDE 5 MG: 5 TABLET ORAL at 03:08

## 2024-08-14 RX ADMIN — METHOCARBAMOL 500 MG: 500 TABLET ORAL at 09:08

## 2024-08-14 RX ADMIN — PROPOFOL 70 MG: 10 INJECTION, EMULSION INTRAVENOUS at 11:08

## 2024-08-14 RX ADMIN — GABAPENTIN 300 MG: 300 CAPSULE ORAL at 03:08

## 2024-08-14 RX ADMIN — FENTANYL CITRATE 25 MCG: 50 INJECTION INTRAMUSCULAR; INTRAVENOUS at 03:08

## 2024-08-14 RX ADMIN — ACETAMINOPHEN 1000 MG: 325 TABLET ORAL at 06:08

## 2024-08-14 RX ADMIN — MORPHINE SULFATE 4 MG: 4 INJECTION INTRAVENOUS at 12:08

## 2024-08-14 RX ADMIN — GABAPENTIN 300 MG: 300 CAPSULE ORAL at 09:08

## 2024-08-14 RX ADMIN — PHENYLEPHRINE HYDROCHLORIDE 100 MCG: 10 INJECTION INTRAVENOUS at 11:08

## 2024-08-14 RX ADMIN — INSULIN ASPART 1 UNITS: 100 INJECTION, SOLUTION INTRAVENOUS; SUBCUTANEOUS at 10:08

## 2024-08-14 RX ADMIN — PHENYLEPHRINE HYDROCHLORIDE 100 MCG: 10 INJECTION INTRAVENOUS at 02:08

## 2024-08-14 RX ADMIN — SUGAMMADEX 200 MG: 100 INJECTION, SOLUTION INTRAVENOUS at 02:08

## 2024-08-14 RX ADMIN — LIDOCAINE HYDROCHLORIDE 20 ML: 10 INJECTION, SOLUTION INFILTRATION; PERINEURAL at 02:08

## 2024-08-14 RX ADMIN — FENTANYL CITRATE 50 MCG: 0.05 INJECTION, SOLUTION INTRAMUSCULAR; INTRAVENOUS at 11:08

## 2024-08-14 RX ADMIN — INSULIN ASPART 2 UNITS: 100 INJECTION, SOLUTION INTRAVENOUS; SUBCUTANEOUS at 03:08

## 2024-08-14 RX ADMIN — BUPIVACAINE HYDROCHLORIDE 10 ML: 7.5 INJECTION, SOLUTION EPIDURAL; RETROBULBAR at 11:08

## 2024-08-14 RX ADMIN — HEPARIN SODIUM 5000 UNITS: 5000 INJECTION INTRAVENOUS; SUBCUTANEOUS at 09:08

## 2024-08-14 RX ADMIN — ROCURONIUM BROMIDE 50 MG: 10 INJECTION, SOLUTION INTRAVENOUS at 11:08

## 2024-08-14 RX ADMIN — PROPOFOL 20 MG: 10 INJECTION, EMULSION INTRAVENOUS at 01:08

## 2024-08-14 RX ADMIN — CEFAZOLIN 2 G: 2 INJECTION, POWDER, FOR SOLUTION INTRAMUSCULAR; INTRAVENOUS at 10:08

## 2024-08-14 RX ADMIN — MUPIROCIN: 20 OINTMENT TOPICAL at 09:08

## 2024-08-14 RX ADMIN — METHOCARBAMOL 500 MG: 500 TABLET ORAL at 04:08

## 2024-08-14 RX ADMIN — PROPOFOL 30 MG: 10 INJECTION, EMULSION INTRAVENOUS at 02:08

## 2024-08-14 RX ADMIN — OXYCODONE HYDROCHLORIDE 10 MG: 10 TABLET ORAL at 03:08

## 2024-08-14 RX ADMIN — ROCURONIUM BROMIDE 20 MG: 10 INJECTION, SOLUTION INTRAVENOUS at 01:08

## 2024-08-14 RX ADMIN — PHENYLEPHRINE HYDROCHLORIDE 0.25 MCG/KG/MIN: 10 INJECTION INTRAVENOUS at 12:08

## 2024-08-14 RX ADMIN — PHENYLEPHRINE HYDROCHLORIDE 100 MCG: 10 INJECTION INTRAVENOUS at 12:08

## 2024-08-14 RX ADMIN — ONDANSETRON 4 MG: 2 INJECTION INTRAMUSCULAR; INTRAVENOUS at 02:08

## 2024-08-14 RX ADMIN — ACETAMINOPHEN 1000 MG: 325 TABLET ORAL at 09:08

## 2024-08-14 NOTE — ANESTHESIA PROCEDURE NOTES
Right Popliteal Single Injection    Patient location during procedure: pre-op   Block not for primary anesthetic.  Reason for block: at surgeon's request and post-op pain management   Post-op Pain Location: Right ankle pain   Start time: 8/14/2024 11:26 AM  Timeout: 8/14/2024 11:25 AM   End time: 8/14/2024 11:30 AM    Staffing  Authorizing Provider: Lyric Sandhu MD  Performing Provider: Roberto Mtz DO    Staffing  Performed by: Roberto Mtz DO  Authorized by: Lyric Sandhu MD    Preanesthetic Checklist  Completed: patient identified, IV checked, site marked, risks and benefits discussed, surgical consent, monitors and equipment checked, pre-op evaluation and timeout performed  Peripheral Block  Patient position: supine  Prep: ChloraPrep  Patient monitoring: heart rate, cardiac monitor, continuous pulse ox, continuous capnometry and frequent blood pressure checks  Block type: popliteal  Laterality: right  Injection technique: single shot  Needle  Needle type: Stimuplex   Needle gauge: 21 G  Needle length: 4 in  Needle localization: anatomical landmarks and ultrasound guidance   -ultrasound image captured on disc.  Assessment  Injection assessment: negative aspiration, negative parasthesia and local visualized surrounding nerve  Paresthesia pain: none  Heart rate change: no  Slow fractionated injection: yes  Pain Tolerance: comfortable throughout block and no complaints  Medications:    Medications: bupivacaine (pf) (MARCAINE) injection 0.75% - Perineural   15 mL - 8/14/2024 11:30:00 AM    Additional Notes  VSS.  See IntraOp record for vitals.  Patient tolerated procedure well.

## 2024-08-14 NOTE — CARE UPDATE
Patient admitted early this am after fall at grocery store with closed right trimalleolar fracture of right ankle an multiple right sided pelvic fractures. Patient taken to OR today with Dr. Davalos and underwent ORIF of right ankle. Per Dr. Davalos patient will need   Eliquis x 10 weeks for DVT prophylaxis post-op but holding off on starting at this time as she will need to return back to OR on 8/16 with Dr. Davalos and undergo open surgery to repair her right acetabular fracture and high risk for high amount of blood loss as per Ortho so in meantime plan will be Heparin 5000 units subcutaneous TID until her next surgery and after all surgeries complete can switch her over to Eliquis for DVT prophylaxis for 10 weeks. Per Ortho, patient to be non weight bearing right lower extremity x 10 weeks postop. Per Ortho plan is to remove current splint to right ankle splint and change dressing and place a well-padded short-leg fiberglass cast prior to hospital discharge. Per Ortho, after incision healed and sutures removed most likely 3 weeks postop then consider placement into a CAM boot versus continuing the cast, depending on patient preference, inability to place an remove CAM boot. I saw patient in her room after surgery an she is very sleepy from anesthesia but indicated she was not having an active right ankle pain. Labs reviewed and Magnesium is low at 1.5 and will replace.     HUNTER BARROSO MD  Attending Staff Physician   Department of Hospital Medicine, [facility] on [facility] [address]  Pager: [phone]  Spectralink: [phone]

## 2024-08-14 NOTE — ASSESSMENT & PLAN NOTE
- euvolemic on exam  -   - CXR showed interstitial findings may reflect edema versus chronic change, no pleural effusion  - pt is not on a diuretic at this time  - continue to monitor volume status closely  Results for orders placed during the hospital encounter of 11/03/23    Echo    Interpretation Summary    Left Ventricle: The left ventricle is normal in size. Increased wall thickness. Moderate septal thickening. Normal wall motion. There is normal systolic function with a visually estimated ejection fraction of 65 - 70%. Grade I diastolic dysfunction.    Right Ventricle: Normal right ventricular cavity size. Systolic function is normal.    Left Atrium: Left atrium is severely dilated.    Aortic Valve: There is moderate stenosis. Aortic valve area by VTI is 1.02 cm². Aortic valve peak velocity is 2.59 m/s. Mean gradient is 18 mmHg. The dimensionless index is 0.32.    Mitral Valve: There is mild regurgitation.    Tricuspid Valve: There is mild regurgitation.    Pulmonary Artery: The estimated pulmonary artery systolic pressure is 35 mmHg.    IVC/SVC: Normal venous pressure at 3 mmHg.

## 2024-08-14 NOTE — ED NOTES
Received report from FRANCIS Schmidt. Introduced self at bedside, pt reports pain to L hip but reports not wanting any meds at this time. Pt is SAVANNAH Serrato.

## 2024-08-14 NOTE — H&P
[facility] Medicine  History & Physical    Patient Name: Lorena Contreras  MRN: [id number]  Patient Class: IP- Inpatient  Admission Date: 8/13/2024  Attending Physician: Daniela Abernathy MD   Primary Care Provider: Jorge Paris MD         Patient information was obtained from patient and ER records.     Subjective:     Principal Problem:Multiple fractures of pelvis    Chief Complaint:   Chief Complaint   Patient presents with    Fall     Arrived via EMS, c/o right hip pain, secondary to a fall from standing. Pt denies any LOC or any other pain. Pt reports falling due to twisting her ankle and losing balance.     Transfer     Sent from [facility] for ortho consult for right tib/fib fracture and pelvic fracture after fall.        HPI: Lorena Contreras is 72 y/o with PMHx of DM2, Fibromyalgia, HTN, HLD, CVA, MI, and CAD presents to ED via EMS as a [facility] transfer for a fall. Pt was walking into grocery store when she twisted her ankle and fell despite attempts to catch herself. Denies LOC or head trauma. Patient fell on her right side. Had immediate pain to her ankle and hip. Pt was unable to ambulate due to pain. Pain is worse in her ankle. States it has subsided s/p pain medications. Pain has been exacerbated when moving. Had an episode of vomiting while splinting ankle but otherwise no further episodes. Denies fever, chills, chest pain, SOB, abdominal pain and urinary symptoms. Has numbness/ tingling to bilateral feet which she states is chronic 2/2 neuropathy.    In the ED: Afebrile, VSS. H&H 10.9 & 33.2. Cr 1.9. Glucose 348. LFTs AST 50, ALT 45. . Urine and drug toxicology screening pending. Chest Xray impression was interstitial findings may reflect edema versus chronic change, no large focal consolidation. Right Ankle Xray impression was distal tibial and fibular fractures as described. Right Hip Xray impression was fractures of the right inferior and superior pubic  rami. There is cortical irregularity involving the right iliac wing, concerning for additional fracture. Right Knee Xray showed  no acute displaced fracture or dislocation of the knee. CT Pelvis showed acute traumatic fractures involving the right iliac wing, the anterior pillar of the right acetabulum and the lateral most aspect of the right superior ramus, and the right inferior pubic ramus. No hip dislocation. CT Lumbar impression was There is no lumbar vertebral body fracture. There is grade 1 anterolisthesis of L4 on L5. At L3/L4, there is moderate central canal narrowing secondary to the facet arthrosis and ligamentum flavum hypertrophy without significant foraminal stenosis. At L4/L5, there is no stone significant central canal narrowing, but there is bilateral mild foraminal narrowing. Secondary to ligamentum flavum hypertrophy and facet arthrosis. At L5/S1, there is a broad-based disc bulge without any significant central canal stenosis or foraminal stenosis. Pain medications given in the ED. Ortho consulted. Admitted to .        Past Medical History:   Diagnosis Date    Allergy     Altered mental status 06/19/2022    DYSARTHRIA, SPASTIC MOVEMENTS & DIFFICULTY SWALLOWING    Anemia     Anxiety     Arthritis     Cataract     both removed    Colon polyps     Coronary artery disease     Depression     Diabetes mellitus, type II     Disorder of kidney and ureter     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/2023    Fibromyalgia     Follicular lymphoma     GERD (gastroesophageal reflux disease)     HTN (hypertension)     Hyperlipidemia     MI (myocardial infarction) 03/2019    Personal history of colonic polyps     Restless leg syndrome     Stroke        Past Surgical History:   Procedure Laterality Date    COLONOSCOPY  11/07/2012    Colon polyp found; repeat in 5 years    ELBOW SURGERY Right 2015    dislocation repair     ESOPHAGOGASTRODUODENOSCOPY  11/07/2012    atrophic gastritis, H pylori testing negative     INCISION AND DRAINAGE FOOT Right 6/2/2021    Procedure: INCISION AND DRAINAGE, FOOT, bone biopsy;  Surgeon: Quiana Penn DPM;  Location: [facility] OR;  Service: Podiatry;  Laterality: Right;    KNEE SURGERY Bilateral 2015    scoped    LEFT HEART CATHETERIZATION Left 3/29/2019    Procedure: Left heart cath;  Surgeon: Bladimir Barbosa MD;  Location: [facility] CATH LAB;  Service: Cardiology;  Laterality: Left;    LEFT HEART CATHETERIZATION Left 11/18/2019    Procedure: Left heart cath;  Surgeon: Karl Rico MD;  Location: [facility] CATH LAB;  Service: Cardiology;  Laterality: Left;    LEFT HEART CATHETERIZATION Left 1/8/2020    Procedure: Left heart cath, right radial, noon start;  Surgeon: Christos Monreal MD;  Location: [facility] CATH LAB;  Service: Cardiology;  Laterality: Left;  RN Pre Op 1-6-20.  To be admitted 1-7-20 sor Aspirin Disensitation    TONSILLECTOMY  1955    ULTRASOUND GUIDANCE  1/8/2020    Procedure: Ultrasound Guidance;  Surgeon: Christos Monreal MD;  Location: [facility] CATH LAB;  Service: Cardiology;;       Review of patient's allergies indicates:   Allergen Reactions    Novolin 70/30 (semi-synthetic) Nausea And Vomiting     Severe vomiting on Relion 70/30    Sulfa (sulfonamide antibiotics) Anaphylaxis    Talwin [pentazocine lactate] Anaphylaxis    Victoza [liraglutide] Nausea And Vomiting    Glipizide Nausea Only    Citric acid     Codeine     Influenza virus vaccines Hives    Iodine and iodide containing products Hives    Levetiracetam Itching    Rituxan [rituximab] Hives    Zoloft [sertraline] Nausea And Vomiting       No current facility-administered medications on file prior to encounter.     Current Outpatient Medications on File Prior to Encounter   Medication Sig    albuterol (PROVENTIL/VENTOLIN HFA) 90 mcg/actuation inhaler Inhale 2 puffs into the lungs every 6 (six) hours as needed for Wheezing or Shortness of Breath.    ASCORBIC ACID, VITAMIN C, ORAL Take by mouth once daily.    aspirin 81 MG Chew Take 1  tablet (81 mg total) by mouth once daily.    atorvastatin (LIPITOR) 80 MG tablet Take 1 tablet by mouth in the evening    Bacillus coagulans (DIGESTIVE ADVANTAGE IMMUNE ORAL) Take by mouth.    cholecalciferol, vitamin D3, (VITAMIN D3) 50 mcg (2,000 unit) Cap Take 2 capsules by mouth 2 (two) times daily.    cyanocobalamin (VITAMIN B-12) 1000 MCG tablet Take 100 mcg by mouth once daily.    DEXCOM G7  Misc Use 1  to track blood glucose, ICD10: E11.65    DEXCOM G7 SENSOR Samina Use 1 sensor every 10 days to track blood glucose, ICD10: E11.65, okay with 90 day supply if possible    diclofenac sodium (VOLTAREN TOP) Apply topically.    EPINEPHrine (EPIPEN) 0.3 mg/0.3 mL AtIn Inject 0.3 mLs (0.3 mg total) into the muscle once. for 1 dose    ESOMEPRAZOLE MAGNESIUM ORAL Take by mouth as needed. (Patient not taking: Reported on 5/28/2024)    FLUoxetine 40 MG capsule Take 1 capsule (40 mg total) by mouth once daily.    gabapentin (NEURONTIN) 300 MG capsule Take 1 capsule (300 mg total) by mouth 3 (three) times daily.    hydrALAZINE (APRESOLINE) 50 MG tablet Take 1 tablet (50 mg total) by mouth every 8 (eight) hours.    isosorbide mononitrate (IMDUR) 30 MG 24 hr tablet Take 1 tablet (30 mg total) by mouth once daily.    LANTUS SOLOSTAR U-100 INSULIN 100 unit/mL (3 mL) InPn pen Inject 12 Units into the skin every evening.    LIDOcaine (LIDODERM) 5 % Place 1 patch onto the skin once daily. Remove & Discard patch within 12 hours or as directed by MD.    LORazepam (ATIVAN) 1 MG tablet Take 1 tablet (1 mg total) by mouth 2 (two) times daily as needed for Anxiety.    magnesium oxide (MAG-OX) 400 mg tablet Take 400 mg by mouth once daily.    melatonin 10 mg Cap Take 10 mg by mouth nightly.    metoprolol succinate (TOPROL-XL) 25 MG 24 hr tablet Take 1 tablet (25 mg total) by mouth once daily.    multivitamin/iron/folic acid (CENTRUM COMPLETE ORAL) Take by mouth.    NOVOLOG FLEXPEN U-100 INSULIN 100 unit/mL (3 mL) InPn pen  "Inject 20 Units into the skin 3 (three) times daily with meals.    OZEMPIC 1 mg/dose (4 mg/3 mL) Inject 1 mg into the skin every 7 days.    pantoprazole (PROTONIX) 40 MG tablet Take 1 tablet (40 mg total) by mouth once daily.    pen needle, diabetic (BD ULTRA-FINE SAGAR PEN NEEDLE) 32 gauge x 5/32" Ndle One pen needle use with insulin pen 4 times a day.  ICD-10: E11.9    semaglutide (OZEMPIC) 0.25 mg or 0.5 mg (2 mg/3 mL) pen injector Inject 0.5 mg once weekly thereafter    ticagrelor (BRILINTA) 90 mg tablet Take 1 tablet (90 mg total) by mouth 2 (two) times daily.    vitamin E 1000 UNIT capsule Take 1,000 Units by mouth once daily.     Family History       Problem Relation (Age of Onset)    Allergy (severe) Daughter    Cancer Mother, Father    Diabetes Sister    Heart disease Mother    Hypertension Sister    Lung cancer Brother    No Known Problems Daughter          Tobacco Use    Smoking status: Never    Smokeless tobacco: Never   Substance and Sexual Activity    Alcohol use: Not Currently    Drug use: Never    Sexual activity: Not Currently     Partners: Male     Review of Systems   Constitutional:  Positive for activity change. Negative for chills and fever.   HENT:  Negative for congestion, hearing loss, rhinorrhea and sore throat.    Eyes:  Negative for visual disturbance.   Respiratory:  Negative for shortness of breath.    Cardiovascular:  Negative for chest pain and leg swelling.   Gastrointestinal:  Negative for abdominal pain, constipation, diarrhea, nausea and vomiting.   Genitourinary:  Negative for dysuria, frequency and urgency.   Musculoskeletal:  Positive for arthralgias and myalgias.   Neurological:  Positive for numbness (chronic).     Objective:     Vital Signs (Most Recent):  Temp: 98 °F (36.7 °C) (08/14/24 0043)  Pulse: 81 (08/14/24 0133)  Resp: 18 (08/14/24 0043)  BP: 138/64 (08/14/24 0133)  SpO2: 99 % (08/14/24 0133) Vital Signs (24h Range):  Temp:  [98 °F (36.7 °C)-98.6 °F (37 °C)] 98 °F " (36.7 °C)  Pulse:  [] 81  Resp:  [16-21] 18  SpO2:  [95 %-100 %] 99 %  BP: (114-195)/() 138/64     Weight: 81.6 kg (179 lb 14.3 oz)  Body mass index is 26.57 kg/m².     Physical Exam  Constitutional:       General: She is not in acute distress.     Appearance: Normal appearance. She is not ill-appearing.      Comments: Uncomfortable appearing 2/2 pain   HENT:      Head: Normocephalic and atraumatic.   Eyes:      Extraocular Movements: Extraocular movements intact.   Cardiovascular:      Rate and Rhythm: Normal rate and regular rhythm.      Heart sounds: Murmur heard.      No friction rub. No gallop.   Pulmonary:      Effort: Pulmonary effort is normal.      Breath sounds: Normal breath sounds. No wheezing, rhonchi or rales.   Abdominal:      General: There is no distension.      Tenderness: There is no abdominal tenderness. There is no guarding.   Musculoskeletal:         General: Tenderness and signs of injury present.      Right lower leg: No edema.      Left lower leg: No edema.      Comments: Unable to assess ROM of RLE 2/2 pain  TTP over R hip  RLE splinted by Ortho  Sensation diminished to toes 2/2 neuropathy (chronic)   Neurological:      General: No focal deficit present.      Mental Status: She is alert and oriented to person, place, and time.                Significant Labs: All pertinent labs within the past 24 hours have been reviewed.  BMP:   Recent Labs   Lab 08/14/24  0220   *   *   K 4.5      CO2 17*   BUN 32*   CREATININE 1.9*   CALCIUM 8.6*   MG 1.5*     CBC:   Recent Labs   Lab 08/13/24  1650 08/14/24  0220   WBC 10.49 11.26   HGB 10.9* 9.9*   HCT 33.2* 30.9*    175       Significant Imaging: I have reviewed all pertinent imaging results/findings within the past 24 hours.    Imaging Results              CT Ankle (Including Hindfoot) Without Contrast Right (In process)                      CT 3D Rendering WO Independent Workstation (In process)                       X-Ray Ankle Complete Right (In process)                      CT Pelvis Without Contrast (Final result)  Result time 08/13/24 20:58:12      Final result by Quiana Chawla MD (08/13/24 20:58:12)                   Impression:      Acute traumatic fractures involving the right iliac wing, the anterior pillar of the right acetabulum and the lateral most aspect of the right superior ramus, and the right inferior pubic ramus.  No hip dislocation.      Electronically signed by: Quiana Chawla  Date:    08/13/2024  Time:    20:58               Narrative:    EXAMINATION:  CT PELVIS WITHOUT CONTRAST    CLINICAL HISTORY:  Pelvic trauma;    TECHNIQUE:  1.25 mm axial images were obtained through the pelvis from above the iliac crest through the upper femoral shafts.    COMPARISON:  Plain hip radiograph 08/13/2020 full    FINDINGS:  There is a minimally displaced fracture of the right iliac wing.  There are comminuted fractures involving the anterior pillar of the right acetabulum and the lateral most aspect of the right superior ramus.  There is comminuted and overlapping fracture of the right inferior pubic ramus.  The hips are not dislocated.  The SI joints are intact.  There are degenerative changes seen at the sacroiliac joints and the pubic symphysis.  Bones are osteopenic.  Incidental note is made of vascular calcifications and a small fat containing umbilical hernia.                                       CT Lumbar Spine Without Contrast (Final result)  Result time 08/13/24 20:31:47      Final result by Quiana Chawla MD (08/13/24 20:31:47)                   Impression:      No acute lumbar fracture.  Multilevel degenerative change greatest at L4/L5.    Small bilateral pleural effusions right greater than left.    Gallbladder sludge or gravel-like gallstones.      Electronically signed by: Quiana Chawla  Date:    08/13/2024  Time:    20:31               Narrative:    EXAMINATION:  CT OF THE LUMBAR SPINE  WITHOUT    CLINICAL HISTORY:  Complaining of right hip pain secondary to fall from standing.    TECHNIQUE:  1.25 mm axial images were obtained through the lumbar spine. Coronal and sagittal reformatted images were provided.    COMPARISON:  None.    FINDINGS:  There is no lumbar vertebral body fracture.  There is grade 1 anterolisthesis of L4 on L5.  At L3/L4, there is moderate central canal narrowing secondary to the facet arthrosis and ligamentum flavum hypertrophy without significant foraminal stenosis.  At L4/L5, there is no stone significant central canal narrowing, but there is bilateral mild foraminal narrowing.  Secondary to ligamentum flavum hypertrophy and facet arthrosis.  At L5/S1, there is a broad-based disc bulge without any significant central canal stenosis or foraminal stenosis.  There is small marginal osteophytes throughout.  Vacuum phenomena is seen at L4/L5 with mild intervertebral disc space narrowing.  Incidental note is made of small bilateral pleural effusions right greater than left and gallbladder sludge or gravel-like gallstones.                                       X-Ray Chest AP Portable (Final result)  Result time 08/13/24 17:49:32      Final result by Eddie Montilla MD (08/13/24 17:49:32)                   Impression:      1. Interstitial findings may reflect edema versus chronic change, no large focal consolidation.      Electronically signed by: Eddie Montilla MD  Date:    08/13/2024  Time:    17:49               Narrative:    EXAMINATION:  XR CHEST AP PORTABLE    CLINICAL HISTORY:  Chest Pain;    TECHNIQUE:  Single frontal view of the chest was performed.    COMPARISON:  11/03/2023    FINDINGS:  The cardiomediastinal silhouette is prominent, magnified by technique, similar to the previous exam noting calcification of the aorta..  There is no pleural effusion.  The trachea is midline.  The lungs are symmetrically expanded bilaterally with coarse interstitial attenuation in a  central hilar distribution..  No large focal consolidation seen.  There is no pneumothorax.  The osseous structures are remarkable for degenerative changes..                                       X-Ray Ankle Complete Right (Final result)  Result time 08/13/24 17:50:39      Final result by Eddie Montilla MD (08/13/24 17:50:39)                   Impression:      1. Distal tibial and fibular fractures as described.      Electronically signed by: Eddie Montilla MD  Date:    08/13/2024  Time:    17:50               Narrative:    EXAMINATION:  XR ANKLE COMPLETE 3 VIEW RIGHT    CLINICAL HISTORY:  Unspecified fall, initial encounter    TECHNIQUE:  AP, lateral, and oblique images of the right ankle were performed.    COMPARISON:  Radiograph of the foot 06/01/2021    FINDINGS:  Three views right ankle.    There is osteopenia.  There is acute appearing fracture involving the distal aspect of the fibula.  There is comminuted fracture of the medial malleolus.  The ankle mortise is intact.  There is edema about the ankle.  The posterior aspect of the tibia appears intact.  There are degenerative changes of the calcaneus.  There is vascular calcification.                                       X-Ray Hip 2 or 3 views Right with Pelvis when performed (Final result)  Result time 08/13/24 17:53:13      Final result by Eddie Montilla MD (08/13/24 17:53:13)                   Impression:      1. Fractures of the right inferior and superior pubic rami.  2. There is cortical irregularity involving the right iliac wing, concerning for additional fracture.      Electronically signed by: Eddie Montilla MD  Date:    08/13/2024  Time:    17:53               Narrative:    EXAMINATION:  XR HIP WITH PELVIS WHEN PERFORMED 2 OR 3 VIEWS RIGHT    CLINICAL HISTORY:  Unspecified fall, initial encounter    TECHNIQUE:  AP view of the pelvis and frog leg lateral view of the right hip were performed.    COMPARISON:  None    FINDINGS:  Three views  right hip.    The bilateral sacroiliac joints are intact.  The pubic symphysis is intact.  There is osteopenia.  There are fractures of the right superior and inferior pubic rami.  The bilateral femoroacetabular joints are intact noting degenerative change.  There is fracture involving the right iliac wing.                                       X-Ray Knee 1 or 2 View Right (Final result)  Result time 08/13/24 17:53:54   Procedure changed from X-Ray Knee 3 View Right     Final result by Eddie Montilla MD (08/13/24 17:53:54)                   Impression:      1. No acute displaced fracture or dislocation of the knee.      Electronically signed by: Eddie Montilla MD  Date:    08/13/2024  Time:    17:53               Narrative:    EXAMINATION:  XR KNEE 1 OR 2 VIEW RIGHT    CLINICAL HISTORY:  Pain;  Unspecified fall, initial encounter    TECHNIQUE:  AP and lateral views of the right knee were performed.    COMPARISON:  11/20/2014    FINDINGS:  Two views right knee.    There is osteopenia.  There are degenerative changes of the knee.  There is vascular calcification.  No large right knee joint effusion.                                      Assessment/Plan:     * Multiple fractures of pelvis  Closed fracture of right iliac wing  Fracture of acetabulum  - AF, VSS  - Xray Hip showed fractures of the right inferior and superior pubic rami   - CT Pelvis showed acute traumatic fractures involving the right iliac wing, the anterior pillar of the right acetabulum and the lateral most aspect of the right superior ramus, and the right inferior pubic ramus   - Ortho consulted- +/- surgical intervention in the AM  - multimodal pain regimen  - PT/OT once POC determined by Ortho  - fall precautions  - NPO  - holding DVT ppx tonight until Ortho's decision    Transaminitis  - AST/ ALT mildly elevated  - hold statin  - recheck levels in the AM    Closed fracture of right tibia and fibula  - Xray ankles showed distal tibia and fibula  fractures  - ortho consulted, appreciate assistance  - multimodal pain regimen    Chronic diastolic heart failure  - euvolemic on exam  -   - CXR showed interstitial findings may reflect edema versus chronic change, no pleural effusion  - pt is not on a diuretic at this time  - continue to monitor volume status closely  Results for orders placed during the hospital encounter of 11/03/23    Echo    Interpretation Summary    Left Ventricle: The left ventricle is normal in size. Increased wall thickness. Moderate septal thickening. Normal wall motion. There is normal systolic function with a visually estimated ejection fraction of 65 - 70%. Grade I diastolic dysfunction.    Right Ventricle: Normal right ventricular cavity size. Systolic function is normal.    Left Atrium: Left atrium is severely dilated.    Aortic Valve: There is moderate stenosis. Aortic valve area by VTI is 1.02 cm². Aortic valve peak velocity is 2.59 m/s. Mean gradient is 18 mmHg. The dimensionless index is 0.32.    Mitral Valve: There is mild regurgitation.    Tricuspid Valve: There is mild regurgitation.    Pulmonary Artery: The estimated pulmonary artery systolic pressure is 35 mmHg.    IVC/SVC: Normal venous pressure at 3 mmHg.        Stage 3a chronic kidney disease  Creatine stable for now. BMP reviewed- noted Estimated Creatinine Clearance: 30.1 mL/min (A) (based on SCr of 1.9 mg/dL (H)). according to latest data. Based on current GFR, CKD stage is stage 4 - GFR 15-29.  Monitor UOP and serial BMP and adjust therapy as needed. Renally dose meds. Avoid nephrotoxic medications and procedures.  - Cr 1.9 (near most recent baseline)  - daily BMP  - avoid nephrotoxic medications    Coronary artery disease of native artery of native heart with stable angina pectoris  Patient with known CAD which is controlled Will continue Statin and monitor for S/Sx of angina/ACS. Continue to monitor on telemetry. Holding blood thinners for possible orthopedic  surgical intervention.     Type 2 diabetes mellitus with stage 3 chronic kidney disease, with long-term current use of insulin  - hx noted  - glucose 348 on admit  - continue weight based lantus (10U) - will short acting insulin once patient is able to eat  - low dose SSI  - diabetic diet  - POCT checks    Hyperlipidemia  - continue home statin    Hypertension associated with diabetes  - Chronic, controlled  - continue home imdur and metoprolol    Anxiety  - hx noted  - continue home fluoxetine       VTE Risk Mitigation (From admission, onward)           Ordered     Reason for No Pharmacological VTE Prophylaxis  Once        Question:  Reasons:  Answer:  Risk of Bleeding    08/14/24 0120     IP VTE HIGH RISK PATIENT  Once         08/14/24 0120                   This patient was reviewed in collaboration with Dr. Yossi Schneider.         Denise Hart PARosalioC  Department of Hospital Medicine  [facility] - Surgery

## 2024-08-14 NOTE — CONSULTS
[facility] - Surgery  Orthopedics  Consult Note    Patient Name: Lorena Contreras  MRN: [id number]  Admission Date: 8/13/2024  Hospital Length of Stay: 0 days  Attending Provider: Daniela Abernathy MD  Primary Care Provider: Jorge Paris MD      Inpatient consult to Orthopedic Surgery  Consult performed by: JOSÉ MIGUEL Bowden MD  Consult ordered by: Jenifer Manning MD        Subjective:     Principal Problem:Multiple fractures of pelvis    Chief Complaint:   Chief Complaint   Patient presents with    Fall     Arrived via EMS, c/o right hip pain, secondary to a fall from standing. Pt denies any LOC or any other pain. Pt reports falling due to twisting her ankle and losing balance.     Transfer     Sent from [facility] for ortho consult for right tib/fib fracture and pelvic fracture after fall.        HPI: Lorena Contreras is a 73 y.o. female with PMH significant for DM (last A1c 8.2), CHF, CKD, PVD, CAD, MI, fibromyalgia, GERD, CVA presenting with left ankle and left hip pain after a ground level fall. Patient states she was at the grocery store when she twisted her ankle and fell on her right side. She presented to [facility] with evidence of right pelvic ring fracture and right ankle fracture. Patient denies any head trauma or LOC. The patient denies prior hx of falls. Patient endorses chronic numbness and tingling in bilateral toes from diabetic neuropathy. Denies any other musculoskeletal pain or injuries. Walks w/out assisted devices at baseline. Doesn't take any anticoagulation at baseline. Lives with grandson. Last ate yesterday.      Past Medical History:   Diagnosis Date    Allergy     Altered mental status 06/19/2022    DYSARTHRIA, SPASTIC MOVEMENTS & DIFFICULTY SWALLOWING    Anemia     Anxiety     Arthritis     Cataract     both removed    Colon polyps     Coronary artery disease     Depression     Diabetes mellitus, type II     Disorder of kidney and ureter     Epilepsia partialis continua  4/28/2023    Fibromyalgia     Follicular lymphoma     GERD (gastroesophageal reflux disease)     HTN (hypertension)     Hyperlipidemia     MI (myocardial infarction) 03/2019    Personal history of colonic polyps     Restless leg syndrome     Stroke        Past Surgical History:   Procedure Laterality Date    COLONOSCOPY  11/07/2012    Colon polyp found; repeat in 5 years    ELBOW SURGERY Right 2015    dislocation repair     ESOPHAGOGASTRODUODENOSCOPY  11/07/2012    atrophic gastritis, H pylori testing negative    INCISION AND DRAINAGE FOOT Right 6/2/2021    Procedure: INCISION AND DRAINAGE, FOOT, bone biopsy;  Surgeon: Quiana Penn DPM;  Location: [facility] OR;  Service: Podiatry;  Laterality: Right;    KNEE SURGERY Bilateral 2015    scoped    LEFT HEART CATHETERIZATION Left 3/29/2019    Procedure: Left heart cath;  Surgeon: Bladimir Barbosa MD;  Location: [facility] CATH LAB;  Service: Cardiology;  Laterality: Left;    LEFT HEART CATHETERIZATION Left 11/18/2019    Procedure: Left heart cath;  Surgeon: Karl Rico MD;  Location: [facility] CATH LAB;  Service: Cardiology;  Laterality: Left;    LEFT HEART CATHETERIZATION Left 1/8/2020    Procedure: Left heart cath, right radial, noon start;  Surgeon: Christos Monreal MD;  Location: [facility] CATH LAB;  Service: Cardiology;  Laterality: Left;  RN Pre Op 1-6-20.  To be admitted 1-7-20 sor Aspirin Disensitation    TONSILLECTOMY  1955    ULTRASOUND GUIDANCE  1/8/2020    Procedure: Ultrasound Guidance;  Surgeon: Christos Monreal MD;  Location: [facility] CATH LAB;  Service: Cardiology;;       Review of patient's allergies indicates:   Allergen Reactions    Novolin 70/30 (semi-synthetic) Nausea And Vomiting     Severe vomiting on Relion 70/30    Sulfa (sulfonamide antibiotics) Anaphylaxis    Talwin [pentazocine lactate] Anaphylaxis    Victoza [liraglutide] Nausea And Vomiting    Glipizide Nausea Only    Citric acid     Codeine     Influenza virus vaccines Hives    Iodine and iodide containing  products Hives    Levetiracetam Itching    Rituxan [rituximab] Hives    Zoloft [sertraline] Nausea And Vomiting       Current Facility-Administered Medications   Medication    acetaminophen tablet 1,000 mg    albuterol-ipratropium 2.5 mg-0.5 mg/3 mL nebulizer solution 3 mL    bisacodyL suppository 10 mg    dextrose 10% bolus 125 mL 125 mL    dextrose 10% bolus 250 mL 250 mL    FLUoxetine capsule 40 mg    gabapentin capsule 300 mg    glucagon (human recombinant) injection 1 mg    glucose chewable tablet 16 g    glucose chewable tablet 24 g    insulin aspart U-100 pen 0-5 Units    insulin glargine U-100 (Lantus) pen 10 Units    isosorbide mononitrate 24 hr tablet 30 mg    melatonin tablet 6 mg    methocarbamoL tablet 500 mg    metoprolol succinate (TOPROL-XL) 24 hr tablet 25 mg    morphine injection 2 mg    ondansetron disintegrating tablet 8 mg    oxyCODONE immediate release tablet 5 mg    oxyCODONE immediate release tablet Tab 10 mg    pantoprazole EC tablet 40 mg    polyethylene glycol packet 17 g    promethazine tablet 25 mg    sodium chloride 0.9% flush 10 mL     Family History       Problem Relation (Age of Onset)    Allergy (severe) Daughter    Cancer Mother, Father    Diabetes Sister    Heart disease Mother    Hypertension Sister    Lung cancer Brother    No Known Problems Daughter          Tobacco Use    Smoking status: Never    Smokeless tobacco: Never   Substance and Sexual Activity    Alcohol use: Not Currently    Drug use: Never    Sexual activity: Not Currently     Partners: Male     ROS  Constitutional: negative for fevers  Eyes: negative visual changes  ENT: negative for hearing loss  Respiratory: negative for dyspnea  Cardiovascular: negative for chest pain  Gastrointestinal: negative for abdominal pain  Genitourinary: negative for dysuria  Neurological: negative for headaches  Behavioral/Psych: negative for hallucinations  Endocrine: negative for temperature intolerance    Objective:     Vital Signs  "(Most Recent):  Temp: 98.6 °F (37 °C) (08/14/24 0330)  Pulse: 86 (08/14/24 0330)  Resp: 18 (08/14/24 0330)  BP: (!) 160/70 (08/14/24 0330)  SpO2: 99 % (08/14/24 0330) Vital Signs (24h Range):  Temp:  [98 °F (36.7 °C)-98.6 °F (37 °C)] 98.6 °F (37 °C)  Pulse:  [] 86  Resp:  [16-21] 18  SpO2:  [95 %-100 %] 99 %  BP: (114-195)/() 160/70     Weight: 81.6 kg (179 lb 14.3 oz)  Height: 5' 9" (175.3 cm)  Body mass index is 26.57 kg/m².      Intake/Output Summary (Last 24 hours) at 8/14/2024 0526  Last data filed at 8/13/2024 2221  Gross per 24 hour   Intake 99.87 ml   Output --   Net 99.87 ml        Ortho/SPM Exam  General:  no acute distress, appears stated age   Neuro: alert and oriented x3  Psych: normal mood  Head: normocephalic, atraumatic.  Eyes: no scleral icterus  Mouth: moist mucous membranes  Cardiovascular: extremities warm and well perfused  Lungs: breathing comfortably, equal chest rise bilat  Skin: clean, dry, intact (any exceptions noted in below musculoskeletal exam)    MSK:  RUE:  - Skin intact throughout, no open wounds  - No swelling  - No ecchymosis, erythema, or signs of cellulitis  - NonTTP throughout  - AROM and PROM of the shoulder, elbow, wrist, and hand intact without pain  - Axillary/AIN/PIN/Radial/Median/Ulnar Nerves assessed in isolation without deficit  - SILT throughout  - Compartments soft  - Radial artery palpated   - Capillary Refill <3s    LUE:  - Skin intact throughout, no open wounds  - No swelling  - No ecchymosis, erythema, or signs of cellulitis  - NonTTP throughout  - AROM and PROM of the shoulder, elbow, wrist, and hand intact without pain  - Axillary/AIN/PIN/Radial/Median/Ulnar Nerves assessed in isolation without deficit  - SILT throughout  - Compartments soft  - Radial artery palpated   - Capillary Refill <3s    RLE:  - Skin intact throughout, no open wounds  - Mild swelling of the ankle  - No ecchymosis, erythema, or signs of cellulitis  - TTP at the medial hip, and " distal fibula and medial ankle joint line  - AROM and PROM of the foot intact without pain  - Pain with any ROM of the hip and ankle, pain with ROM of the knee with flexion of the hip, otherwise, noTTP at the knee   - TA/EHL/Gastroc/FHL assessed in isolation without deficit  - Sensation intact distally at the toes but neuropathy, decreased sensation  - Compartments soft  - DP and PT palpated  - Capillary Refill <3s    LLE:  - Skin intact throughout, no open wounds  - No swelling  - No ecchymosis, erythema, or signs of cellulitis  - NonTTP throughout  - AROM and PROM of the hip, knee, ankle, and foot intact without pain  - TA/EHL/Gastroc/FHL assessed in isolation without deficit  - SILT throughout  - Compartments soft  - DP and PT palpated  - Capillary Refill <3s    Spine/pelvis/axial body:  No tenderness to palpation of cervical, thoracic, or lumbar spine  No pain with compression of pelvis  No chest wall or abdominal tenderness  No decubitus ulcers       Significant Labs: All pertinent labs within the past 24 hours have been reviewed.    Significant Imaging: I have reviewed and interpreted all pertinent imaging results/findings.  XR R ankle: trimalleolar ankle fx  Ct R ankle: trimalleolar ankle fracture, vertical medial mal fragment, talar tilt   XR pelvis: anterior column fx, inferior and superior ramus fractures  CT pelvis: anterior column fracture with extension to the iliac wing, inferior pubic ramus fracture   Assessment/Plan:     Closed trimalleolar fracture of right ankle with routine healing  Lorena Contreras is a 73 y.o. female with PMH of DM (last A1c 8.2), CHF, CKD, PVD, CAD, MI, fibromyalgia, GERD, CVA presenting with right ankle and right hip pain after a ground level fall. Patient states she was at the grocery store when she twisted her ankle and fell on her right side. She presented to [facility] with evidence of right pelvic ring fracture and right ankle fracture. Patient denies any head trauma or LOC.  The patient denies prior hx of falls. Patient endorses chronic numbness and tingling in bilateral toes from diabetic neuropathy. Denies any other musculoskeletal pain or injuries. Lives with grandson. XR and CT of the right ankle with trimalleolar ankle fracture. Closed, NVI. Right superior pubic root pelvic fracture with extension to the iliac wing and right inferior pubic ramus fracture. Swelling of the right ankle amenable for definitive fixation today. Will discuss with staff regarding pelvic fixation.     NPO now  Will consent and marilu patient and book case for R ankle ORIF  Hgb 9.9, A1c 8.2, Cr 1.9  NWB RLE  Final plan pending staff discussion for pelvis            JOSÉ MIGUEL Bowden MD  Orthopedics  [facility] - Surgery

## 2024-08-14 NOTE — ED TRIAGE NOTES
Lorena Contreras, a 73 y.o. female presents to the ED w/ complaint of right tib/fib and pelvic fracture transfer from [facility]. Sent to this facility for orthopedic surgery consult. Upon assessment pt in 10/10 and was given 4mg morphine prior to transport from previous hospital. Pt also reports feeling a lot of bladder pressure and requesting a catheter. Pt arrives with right lower leg in splint.       Triage note:  Chief Complaint   Patient presents with    Fall     Arrived via EMS, c/o right hip pain, secondary to a fall from standing. Pt denies any LOC or any other pain. Pt reports falling due to twisting her ankle and losing balance.     Transfer     Sent from [facility] for ortho consult for right tib/fib fracture and pelvic fracture after fall.     Review of patient's allergies indicates:   Allergen Reactions    Novolin 70/30 (semi-synthetic) Nausea And Vomiting     Severe vomiting on Relion 70/30    Sulfa (sulfonamide antibiotics) Anaphylaxis    Talwin [pentazocine lactate] Anaphylaxis    Victoza [liraglutide] Nausea And Vomiting    Glipizide Nausea Only    Citric acid     Codeine     Influenza virus vaccines Hives    Iodine and iodide containing products Hives    Levetiracetam Itching    Rituxan [rituximab] Hives    Zoloft [sertraline] Nausea And Vomiting     Past Medical History:   Diagnosis Date    Allergy     Altered mental status 06/19/2022    DYSARTHRIA, SPASTIC MOVEMENTS & DIFFICULTY SWALLOWING    Anemia     Anxiety     Arthritis     Cataract     both removed    Colon polyps     Coronary artery disease     Depression     Diabetes mellitus, type II     Disorder of kidney and ureter     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/2023    Fibromyalgia     Follicular lymphoma     GERD (gastroesophageal reflux disease)     HTN (hypertension)     Hyperlipidemia     MI (myocardial infarction) 03/2019    Personal history of colonic polyps     Restless leg syndrome     Stroke

## 2024-08-14 NOTE — ASSESSMENT & PLAN NOTE
Lorena Contreras is a 73 y.o. female with PMH of DM (last A1c 8.2), CHF, CKD, PVD, CAD, MI, fibromyalgia, GERD, CVA presenting with right ankle and right hip pain after a ground level fall. Patient states she was at the grocery store when she twisted her ankle and fell on her right side. She presented to [facility] with evidence of right pelvic ring fracture and right ankle fracture. Patient denies any head trauma or LOC. The patient denies prior hx of falls. Patient endorses chronic numbness and tingling in bilateral toes from diabetic neuropathy. Denies any other musculoskeletal pain or injuries. Lives with grandson. XR and CT of the right ankle with trimalleolar ankle fracture. Closed, NVI. Right superior pubic root pelvic fracture with extension to the iliac wing and right inferior pubic ramus fracture. Swelling of the right ankle amenable for definitive fixation today. Will discuss with staff regarding pelvic fixation.     NPO now  Will consent and marilu patient and book case for R ankle ORIF  Hgb 9.9, A1c 8.2, Cr 1.9  NWB RLE  Final plan pending staff discussion for pelvis

## 2024-08-14 NOTE — ANESTHESIA PROCEDURE NOTES
Intubation    Date/Time: 8/14/2024 11:24 AM    Performed by: David Medina CRNA  Authorized by: Lyric Sandhu MD    Intubation:     Induction:  Intravenous    Intubated:  Postinduction    Mask Ventilation:  Easy mask    Attempts:  1    Attempted By:  Student    Method of Intubation:  Direct    Blade:  Mayorga 2    Laryngeal View Grade: Grade I - full view of cords      Difficult Airway Encountered?: No      Complications:  None    Airway Device:  Oral endotracheal tube    Airway Device Size:  7.0    Style/Cuff Inflation:  Cuffed (inflated to minimal occlusive pressure)    Secured at:  The lips    Placement Verified By:  Capnometry    Complicating Factors:  None    Findings Post-Intubation:  BS equal bilateral and atraumatic/condition of teeth unchanged

## 2024-08-14 NOTE — BRIEF OP NOTE
[facility] - Surgery ([facility])  Brief Operative Note    SUMMARY     Surgery Date: 8/14/2024     Surgeons and Role:     * Neto Davalos MD - Primary     * Davian Reeves MD - Resident - Assisting        Pre-op Diagnosis:  Closed fracture of right ankle, initial encounter [S82.891A]  Closed trimalleolar fracture of right ankle with routine healing [S82.851D]    Post-op Diagnosis:  Post-Op Diagnosis Codes:     * Closed fracture of right ankle, initial encounter [S82.891A]     * Closed trimalleolar fracture of right ankle with routine healing [S82.851D]    Procedure(s) (LRB):  ORIF, FRACTURE, PILON (Right)    Anesthesia: General    Implants:  Implant Name Type Inv. Item Serial No.  Lot No. LRB No. Used Action   BONE CHIP CANC 1.7-10MM 15ML - [id number]  BONE CHIP CANC 1.7-10MM 15ML [id number] MUSCULOSKELETAL TRANSPLANT FND  Right 1 Implanted   6 Hole1/3 Tubular Plate    EMANUEL  Right 1 Implanted   SCREW BONE NON LOCK 3.5X30MM - [id number]  SCREW BONE NON LOCK 3.5X30MM  [employer].  Right 2 Implanted   PLATE VARIAX TUBULAR 7H 83MM - [id number]  PLATE VARIAX TUBULAR 7H 83MM  [employer].  Right 1 Implanted   SCREW VARIAX 2 NLOK 3.5X40MM - [id number]  SCREW VARIAX 2 NLOK 3.5X40MM  [employer].  Right 1 Implanted   SCREW BONE NON LOCK 3.5X28MM - [id number]  SCREW BONE NON LOCK 3.5X28MM  [employer].  Right 1 Implanted   SCREW VARIAX NON DILLON 3.5X70MM - [id number]  SCREW VARIAX NON DILLON 3.5X70MM  [employer].  Right 1 Implanted   SCREW BONE NON LOCK 3.5X34MM - [id number]  SCREW BONE NON LOCK 3.5X34MM  [employer].  Right 1 Implanted   SCREW BONE NON LOCK 3.5X36MM - [id number]  SCREW BONE NON LOCK 3.5X36MM  [employer].  Right 1 Implanted   SCREW JELLY SELF TAP 3.9Q686ZW - [id number]  SCREW JELLY SELF TAP 3.8V161YK  [employer].  Right 1 Implanted       Operative Findings: See full op note    Estimated Blood Loss: < 100 mL         Specimens:    Specimen (24h ago, onward)      None            [id number]

## 2024-08-14 NOTE — TRANSFER OF CARE
"Anesthesia Transfer of Care Note    Patient: Lorena Contreras    Procedure(s) Performed: Procedure(s) (LRB):  ORIF, FRACTURE, PILON (Right)    Patient location: PACU    Anesthesia Type: general    Transport from OR: Transported from OR on 6-10 L/min O2 by face mask with adequate spontaneous ventilation    Post pain: adequate analgesia    Post assessment: no apparent anesthetic complications and tolerated procedure well    Post vital signs: stable    Level of consciousness: awake    Nausea/Vomiting: no nausea/vomiting    Complications: none    Transfer of care protocol was followed      Last vitals: Visit Vitals  BP (!) 102/48   Pulse 77   Temp 36.7 °C (98.1 °F) (Temporal)   Resp 16   Ht 5' 9" (1.753 m)   Wt 81.6 kg (180 lb)   SpO2 100%   Breastfeeding No   BMI 26.58 kg/m²     "

## 2024-08-14 NOTE — ASSESSMENT & PLAN NOTE
- hx noted  - glucose 348 on admit  - continue weight based lantus (10U) - will short acting insulin once patient is able to eat  - low dose SSI  - diabetic diet  - POCT checks

## 2024-08-14 NOTE — ASSESSMENT & PLAN NOTE
Patient with known CAD which is controlled Will continue Statin and monitor for S/Sx of angina/ACS. Continue to monitor on telemetry. Holding blood thinners for possible orthopedic surgical intervention.

## 2024-08-14 NOTE — HPI
Lorena Contreras is a 73 y.o. female with PMH significant for DM (last A1c 8.2), CHF, CKD, PVD, CAD, MI, fibromyalgia, GERD, CVA presenting with left ankle and left hip pain after a ground level fall. Patient states she was at the grocery store when she twisted her ankle and fell on her right side. She presented [facility] with evidence of right pelvic ring fracture and right ankle fracture. Patient denies any head trauma or LOC. The patient denies prior hx of falls. Patient endorses chronic numbness and tingling in bilateral toes from diabetic neuropathy. Denies any other musculoskeletal pain or injuries. Walks w/out assisted devices at baseline. Doesn't take any anticoagulation at baseline. Lives with grandson. Last ate yesterday.

## 2024-08-14 NOTE — ASSESSMENT & PLAN NOTE
Closed fracture of right iliac wing  Fracture of acetabulum  - AF, VSS  - Xray Hip showed fractures of the right inferior and superior pubic rami   - CT Pelvis showed acute traumatic fractures involving the right iliac wing, the anterior pillar of the right acetabulum and the lateral most aspect of the right superior ramus, and the right inferior pubic ramus   - Ortho consulted- +/- surgical intervention in the AM  - multimodal pain regimen  - PT/OT once POC determined by Ortho  - fall precautions  - NPO  - holding DVT ppx tonight until Ortho's decision

## 2024-08-14 NOTE — SUBJECTIVE & OBJECTIVE
Past Medical History:   Diagnosis Date    Allergy     Altered mental status 06/19/2022    DYSARTHRIA, SPASTIC MOVEMENTS & DIFFICULTY SWALLOWING    Anemia     Anxiety     Arthritis     Cataract     both removed    Colon polyps     Coronary artery disease     Depression     Diabetes mellitus, type II     Disorder of kidney and ureter     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/2023    Fibromyalgia     Follicular lymphoma     GERD (gastroesophageal reflux disease)     HTN (hypertension)     Hyperlipidemia     MI (myocardial infarction) 03/2019    Personal history of colonic polyps     Restless leg syndrome     Stroke        Past Surgical History:   Procedure Laterality Date    COLONOSCOPY  11/07/2012    Colon polyp found; repeat in 5 years    ELBOW SURGERY Right 2015    dislocation repair     ESOPHAGOGASTRODUODENOSCOPY  11/07/2012    atrophic gastritis, H pylori testing negative    INCISION AND DRAINAGE FOOT Right 6/2/2021    Procedure: INCISION AND DRAINAGE, FOOT, bone biopsy;  Surgeon: Quiana Penn DPM;  Location: [facility] OR;  Service: Podiatry;  Laterality: Right;    KNEE SURGERY Bilateral 2015    scoped    LEFT HEART CATHETERIZATION Left 3/29/2019    Procedure: Left heart cath;  Surgeon: Bladimir Barbosa MD;  Location: [facility] CATH LAB;  Service: Cardiology;  Laterality: Left;    LEFT HEART CATHETERIZATION Left 11/18/2019    Procedure: Left heart cath;  Surgeon: Karl Rico MD;  Location: [facility] CATH LAB;  Service: Cardiology;  Laterality: Left;    LEFT HEART CATHETERIZATION Left 1/8/2020    Procedure: Left heart cath, right radial, noon start;  Surgeon: Christos Monreal MD;  Location: [facility] CATH LAB;  Service: Cardiology;  Laterality: Left;  RN Pre Op 1-6-20.  To be admitted 1-7-20 sor Aspirin Disensitation    TONSILLECTOMY  1955    ULTRASOUND GUIDANCE  1/8/2020    Procedure: Ultrasound Guidance;  Surgeon: Christos Monreal MD;  Location: [facility] CATH LAB;  Service: Cardiology;;       Review of patient's allergies indicates:    Allergen Reactions    Novolin 70/30 (semi-synthetic) Nausea And Vomiting     Severe vomiting on Relion 70/30    Sulfa (sulfonamide antibiotics) Anaphylaxis    Talwin [pentazocine lactate] Anaphylaxis    Victoza [liraglutide] Nausea And Vomiting    Glipizide Nausea Only    Citric acid     Codeine     Influenza virus vaccines Hives    Iodine and iodide containing products Hives    Levetiracetam Itching    Rituxan [rituximab] Hives    Zoloft [sertraline] Nausea And Vomiting       Current Facility-Administered Medications   Medication    acetaminophen tablet 1,000 mg    albuterol-ipratropium 2.5 mg-0.5 mg/3 mL nebulizer solution 3 mL    bisacodyL suppository 10 mg    dextrose 10% bolus 125 mL 125 mL    dextrose 10% bolus 250 mL 250 mL    FLUoxetine capsule 40 mg    gabapentin capsule 300 mg    glucagon (human recombinant) injection 1 mg    glucose chewable tablet 16 g    glucose chewable tablet 24 g    insulin aspart U-100 pen 0-5 Units    insulin glargine U-100 (Lantus) pen 10 Units    isosorbide mononitrate 24 hr tablet 30 mg    melatonin tablet 6 mg    methocarbamoL tablet 500 mg    metoprolol succinate (TOPROL-XL) 24 hr tablet 25 mg    morphine injection 2 mg    ondansetron disintegrating tablet 8 mg    oxyCODONE immediate release tablet 5 mg    oxyCODONE immediate release tablet Tab 10 mg    pantoprazole EC tablet 40 mg    polyethylene glycol packet 17 g    promethazine tablet 25 mg    sodium chloride 0.9% flush 10 mL     Family History       Problem Relation (Age of Onset)    Allergy (severe) Daughter    Cancer Mother, Father    Diabetes Sister    Heart disease Mother    Hypertension Sister    Lung cancer Brother    No Known Problems Daughter          Tobacco Use    Smoking status: Never    Smokeless tobacco: Never   Substance and Sexual Activity    Alcohol use: Not Currently    Drug use: Never    Sexual activity: Not Currently     Partners: Male     ROS  Constitutional: negative for fevers  Eyes: negative  "visual changes  ENT: negative for hearing loss  Respiratory: negative for dyspnea  Cardiovascular: negative for chest pain  Gastrointestinal: negative for abdominal pain  Genitourinary: negative for dysuria  Neurological: negative for headaches  Behavioral/Psych: negative for hallucinations  Endocrine: negative for temperature intolerance    Objective:     Vital Signs (Most Recent):  Temp: 98.6 °F (37 °C) (08/14/24 0330)  Pulse: 86 (08/14/24 0330)  Resp: 18 (08/14/24 0330)  BP: (!) 160/70 (08/14/24 0330)  SpO2: 99 % (08/14/24 0330) Vital Signs (24h Range):  Temp:  [98 °F (36.7 °C)-98.6 °F (37 °C)] 98.6 °F (37 °C)  Pulse:  [] 86  Resp:  [16-21] 18  SpO2:  [95 %-100 %] 99 %  BP: (114-195)/() 160/70     Weight: 81.6 kg (179 lb 14.3 oz)  Height: 5' 9" (175.3 cm)  Body mass index is 26.57 kg/m².      Intake/Output Summary (Last 24 hours) at 8/14/2024 0526  Last data filed at 8/13/2024 2221  Gross per 24 hour   Intake 99.87 ml   Output --   Net 99.87 ml        Ortho/SPM Exam  General:  no acute distress, appears stated age   Neuro: alert and oriented x3  Psych: normal mood  Head: normocephalic, atraumatic.  Eyes: no scleral icterus  Mouth: moist mucous membranes  Cardiovascular: extremities warm and well perfused  Lungs: breathing comfortably, equal chest rise bilat  Skin: clean, dry, intact (any exceptions noted in below musculoskeletal exam)    MSK:  RUE:  - Skin intact throughout, no open wounds  - No swelling  - No ecchymosis, erythema, or signs of cellulitis  - NonTTP throughout  - AROM and PROM of the shoulder, elbow, wrist, and hand intact without pain  - Axillary/AIN/PIN/Radial/Median/Ulnar Nerves assessed in isolation without deficit  - SILT throughout  - Compartments soft  - Radial artery palpated   - Capillary Refill <3s    LUE:  - Skin intact throughout, no open wounds  - No swelling  - No ecchymosis, erythema, or signs of cellulitis  - NonTTP throughout  - AROM and PROM of the shoulder, elbow, " wrist, and hand intact without pain  - Axillary/AIN/PIN/Radial/Median/Ulnar Nerves assessed in isolation without deficit  - SILT throughout  - Compartments soft  - Radial artery palpated   - Capillary Refill <3s    RLE:  - Skin intact throughout, no open wounds  - Mild swelling of the ankle  - No ecchymosis, erythema, or signs of cellulitis  - TTP at the medial hip, and distal fibula and medial ankle joint line  - AROM and PROM of the foot intact without pain  - Pain with any ROM of the hip and ankle, pain with ROM of the knee with flexion of the hip, otherwise, noTTP at the knee   - TA/EHL/Gastroc/FHL assessed in isolation without deficit  - Sensation intact distally at the toes but neuropathy, decreased sensation  - Compartments soft  - DP and PT palpated  - Capillary Refill <3s    LLE:  - Skin intact throughout, no open wounds  - No swelling  - No ecchymosis, erythema, or signs of cellulitis  - NonTTP throughout  - AROM and PROM of the hip, knee, ankle, and foot intact without pain  - TA/EHL/Gastroc/FHL assessed in isolation without deficit  - SILT throughout  - Compartments soft  - DP and PT palpated  - Capillary Refill <3s    Spine/pelvis/axial body:  No tenderness to palpation of cervical, thoracic, or lumbar spine  No pain with compression of pelvis  No chest wall or abdominal tenderness  No decubitus ulcers       Significant Labs: All pertinent labs within the past 24 hours have been reviewed.    Significant Imaging: I have reviewed and interpreted all pertinent imaging results/findings.  XR R ankle: trimalleolar ankle fx  Ct R ankle: trimalleolar ankle fracture, vertical medial mal fragment, talar tilt   XR pelvis: anterior column fx, inferior and superior ramus fractures  CT pelvis: anterior column fracture with extension to the iliac wing, inferior pubic ramus fracture

## 2024-08-14 NOTE — PLAN OF CARE
[facility] - Surgery  Initial Discharge Assessment       Primary Care Provider: Jorge Paris MD    Admission Diagnosis: Fall [W19.XXXA]  Chest pain [R07.9]  Closed fracture of right ankle, initial encounter [S82.891A]  Closed nondisplaced fracture of pelvis, unspecified part of pelvis, initial encounter [S32.9XXA]    Admission Date: 8/13/2024  Expected Discharge Date: 8/19/2024         Payor: [employer] MEDICARE / Plan: [employer] MEDICARE HMO / Product Type: Capitation /     Extended Emergency Contact Information  Primary Emergency Contact: Noelle Ma  Mobile Phone: [phone]  Relation: Daughter  Preferred language: English   needed? No  Secondary Emergency Contact: JAY JAY SMITH  Mobile Phone: [phone]  Relation: Daughter  Preferred language: English   needed? No    Discharge Plan A: Skilled Nursing Facility  Discharge Plan B: Home with family, Home Health      [facility] [place] - [address]  [address]  [place] [address]  Phone: [phone] Fax: [phone]    [facility] [address]  [address]   [place] [address]  Phone: [phone] Fax: [phone]      Initial Assessment (most recent)       Adult Discharge Assessment - 08/14/24 0752          Discharge Assessment    Assessment Type Discharge Planning Assessment     Confirmed/corrected address, phone number and insurance Yes     Confirmed Demographics Correct on Facesheet     Source of Information patient     Does patient/caregiver understand observation status Yes     Communicated MIKHAIL with patient/caregiver Yes     Reason For Admission Fall     People in Home grandchild(daniel)     [facility]     Do you expect to return to your current living situation? Yes     Do you have help at home or someone to help you manage your care at home? Yes     Who are your caregiver(s) and their phone number(s)? Noelle Ma (Daughter) [phone]     Prior to  hospitilization cognitive status: Alert/Oriented     Current cognitive status: Alert/Oriented     Walking or Climbing Stairs Difficulty no     Dressing/Bathing Difficulty no     Home Layout Able to live on 1st floor     Equipment Currently Used at Home hospital bed;walker, rolling;rollator;shower chair     Readmission within 30 days? No     Patient currently being followed by outpatient case management? No     Do you currently have service(s) that help you manage your care at home? No     Do you take prescription medications? Yes     Do you have prescription coverage? Yes     Do you have any problems affording any of your prescribed medications? No     Is the patient taking medications as prescribed? yes     Who is going to help you get home at discharge? Grandson and daughter     How do you get to doctors appointments? family or friend will provide     Are you on dialysis? No     Do you take coumadin? No     Discharge Plan A Skilled Nursing Facility     Discharge Plan B Home with family;Home Health     DME Needed Upon Discharge  none        Physical Activity    On average, how many days per week do you engage in moderate to strenuous exercise (like a brisk walk)? 0 days     On average, how many minutes do you engage in exercise at this level? 0 min        Financial Resource Strain    How hard is it for you to pay for the very basics like food, housing, medical care, and heating? Not hard at all        Housing Stability    In the last 12 months, was there a time when you were not able to pay the mortgage or rent on time? No     At any time in the past 12 months, were you homeless or living in a shelter (including now)? No        Transportation Needs    Has the lack of transportation kept you from medical appointments, meetings, work or from getting things needed for daily living? No        Food Insecurity    Within the past 12 months, you worried that your food would run out before you got the money to buy more.  Never true     Within the past 12 months, the food you bought just didn't last and you didn't have money to get more. Never true        Stress    Do you feel stress - tense, restless, nervous, or anxious, or unable to sleep at night because your mind is troubled all the time - these days? Not at all        Social Isolation    How often do you feel lonely or isolated from those around you?  Never        Alcohol Use    Q1: How often do you have a drink containing alcohol? Never     Q2: How many drinks containing alcohol do you have on a typical day when you are drinking? Patient does not drink     Q3: How often do you have six or more drinks on one occasion? Never        Utilities    In the past 12 months has the electric, gas, oil, or water company threatened to shut off services in your home? No        Health Literacy    How often do you need to have someone help you when you read instructions, pamphlets, or other written material from your doctor or pharmacy? Never                   Spoke with patient at bedside to complete d/c planning assessment. Patient lives with her Grandson and his girlfriend. Home is single story with 4 steps to enter. Patient is independent with ADL's at baseline. Home DME includes a Hospital bed, walker, rollator and shower chair. To OR to today for ORIF of Right ankle. Plan for pelvic Fx's is pending. Patient eager to return home to family once medically stable. She reports between her sister, daughter Noelle and grandson she will have 24 hour care at home. Facility placement briefly discussed. Will have a further discussion following PT/OT evaluation. Discharge Plan A and Plan B have been determined by review of patient's clinical status, future medical and therapeutic needs, and coverage/benefits for post-acute care in coordination with multidisciplinary team members.        Karmen REECE  Case Management  [facility]  [phone]

## 2024-08-14 NOTE — NURSING TRANSFER
Nursing Transfer Note      8/14/2024   4:58 PM    Nurse giving handoff:alexei rn  Nurse receiving handoff:RAMESH RN    Reason patient is being transferred: recovery care complete    Transfer To: 504    Transfer via bed        Transported by PXT X 2    Order for Tele Monitor? No    Additional Lines: Benoit Catheter    4eyes on Skin: yes    Medicines sent: n/a    Any special needs or follow-up needed: routine    Patient belongings transferred with patient:  n/a    Chart send with patient: Yes    Notified: daughter    Patient reassessed at: 08/14 /24 7037

## 2024-08-14 NOTE — ED NOTES
Telemetry Verification   Patient placed on Telemetry Box  Verified with War Room  Box # [id number]   Monitor Tech War room   Rate 77   Rhythm nsr

## 2024-08-14 NOTE — ASSESSMENT & PLAN NOTE
- Xray ankles showed distal tibia and fibula fractures  - ortho consulted, appreciate assistance  - multimodal pain regimen

## 2024-08-14 NOTE — OP NOTE
OPERATIVE NOTE     DATE OF PROCEDURE:  08/14/2024     PREOPERATIVE DIAGNOSIS:           Right ankle pilon fracture, closed, displaced, initial encounter  Right both column acetabular fracture, closed, displaced, initial encounter  Diabetes with neuropathy  Ground level fall     POSTOPERATIVE DIAGNOSIS:         Right ankle pilon fracture, closed, displaced, initial encounter  Right both column acetabular fracture, closed, displaced, initial encounter  Diabetes with neuropathy  Ground level fall     PROCEDURE:              Open reduction internal fixation right ankle pilon fracture with fixation of tibia and fibula     SURGEON:       Neto Davalos MD     ASSISTANT:                 Davian Reeves MD     ANESTHESIA:              Regional block + General     EBL:                  150 mL     COMPLICATIONS:  None     IMPLANTS:       Elizabeth  Fibula  3.5 mm cortical screw, stainless steel  Tibia, VariAx 2  Anterior  3.5 mm cortical screw  Medial  1/3 tubular plate  3.5 mm cortical screw, x3  Posterior  1/3 tubular plate  3.5 mm cortical screw, x3    Allograft bone chips, 15 mL    Vancomycin 1 g  Tobramycin 1.2 g     SPECIMENS:    None     INDICATIONS FOR PROCEDURE:  73-year-old female, diabetes, with history of poor control, prior hemoglobin A1c 13, now 8, CHF, coronary artery disease, prior MI, stroke, fibromyalgia, restless leg  Ground level fall 08/13/2024  Immediate right hip/pelvis pain as well as right ankle pain.    Initially seen at outside hospital ER where a right ankle pilon fracture and a right acetabulum both column fracture were identified.    Transferred to our facility for further care     At the time my evaluation patient complained of isolated pain in her right pelvis/hip area as well as the right ankle.  No numbness no tingling, other than her baseline bilateral lower extremity diabetic neuropathy     Lives at home with grandson   Independent community ambulator   Diabetes, hemoglobin A1c 8,  though was previously 13 for a long period of time   Has peripheral neuropathy bilateral lower extremities to level of ankle  History of prior diabetic foot wounds  Coronary artery disease, prior MI, prior stroke  Fibromyalgia and restless leg syndrome, multiple medication allergies  History of lymphoma, treated multiple years ago with chemo therapy, no known recurrence or bony metastases  Denies tobacco use  Denies history of blood clot     Counseled patient and family members on the following diagnoses   Right ankle pilon fracture   Right both column acetabular fracture  Discussed both non operative operative management of both these injuries      Right ankle pilon fracture  Non operative management in the setting of this injury likely result in persistent malalignment, malunion, potentially nonunion.  I have concern that especially in the setting of her diabetes with neuropathy, the instability would be poorly tolerated and we would lead to worse outcome, potential wound complications and loss of limb.  Discussed operative intervention the form of open reduction internal fixation.  Hopefully we can perform this in a soft tissue friendly fashion minimizing further insult, improve the articular alignment, stability and allow improved overall long-term function.  Given her diabetes, long-term poor control this, and history of foot infections she is at an increased risk for wound complications, need for subsequent surgeries, and amputation.     Right both column acetabular fracture  Discussed this injury and both non operative and operative management options.  Non operative management would likely result in pelvic malunion or nonunion, but more importantly I have concern that she would have significant pain in the early injury.  In have difficulty with mobility.  I recommended a dressing the ankle fracture 1st, seen how she tolerates that is surgery, seen how she tolerates mobility.  If this is left to heal in its  current position, the articular surface of the hip joint is intact, though it was in a overall malaligned position, but if she can tolerate the early postoperative mobility issues which will likely last 1-2 months, she could have satisfactory outcome and avoid the risks of surgery.  I suspect that her early mobility will be significantly limited due to the discontinuity of the right hemipelvis.  In that sense I discussed open reduction internal fixation of the acetabular fracture.  This would likely be performed through an anterior.  A type approach as well as lateral window, and other supplementary incisions for screw placement and reduction tools.  Hopefully this can improve the overall alignment of her acetabulum, pelvis, improve the alignment, early stability, early pain control, mobility, also improve her overall long-term outcome.  I recommend repeat discussion of surgery versus non operative management following treatment of her ankle in order to see how she does with mobility, see how she tolerates anesthesia, and the anticipated longer duration of her ankle surgery and duration of the surgery required for her pelvic fracture.     The risks, benefits, and alternatives to surgery were discussed with the patient and/or family.    Specific risks discussed included, but were not limited to:  Ankle pain, stiffness, wound complications need for further procedures, need for temporizing external fixator placement to allow soft tissue rest, eventual amputation, damage to nearby structures, including neurovascular structures leading to loss of function or loss of limb, bleeding, need for blood transfusion, pain, stiffness, scarring, numbness, tingling, weakness, compartment syndrome, malunion/nonunion, hardware failure, hardware prominence, infection, need for multiple staged procedures, prolonged antibiotics, iatrogenic fracture, heterotopic ossification, arthritis, a variety of medical complications including but  not limited to heart attack, stroke, deep venous thrombosis, pulmonary embolism, prolonged hospitalization, prolonged intubation, and death.   Patient and/or family expressed an understanding and desires to proceed with surgery.   All questions were answered.  No guarantees were implied or stated.  Informed consent was obtained.     Plan for ORIF right pilon fracture today, with backup plan of external fixator if the soft tissue swelling it was not amenable to definitive fixation at this time.    After this surgery we will rediscuss treatment of her acetabulum fracture, but tentatively planned for surgery this Friday.     Regardless of treatment would likely recommend nonweightbearing status to the right lower extremity for 8 weeks or more due to the pilon fracture and neuropathy        OPERATIVE PROCEDURE:  Patient met in the preoperative hold area and the correct site and side of surgery being the right lower extremity lower extremity were marked and verified.  Preoperative regional block placed by anesthesia.  Patient brought back to the operative suite.  General anesthesia smoothly induced.  Patient transferred over to operative table.  Placed in supine position. All bony prominences were appropriately padded.  Bump placed onto opposite side hip, bone foam placed under operative extremity.  Patient received 2 g Ancef for preoperative antibiotics.  The right lower extremity lower extremity was then prepped and draped in normal sterile fashion.     Time-out was performed verifying the correct patient, site/side of surgery, surgical consent, radiographs as applicable, preop antibiotics, necessary equipment, anticipated blood loss, length of procedure, postoperative disposition.     We evaluated the patient's soft tissues in the appeared amenable for definitive surgery today     Esmarch was utilized, tourniquet was inflated at 300 mm mercury for approximately 30 minutes min during the case, and was let down due to  venous tourniquet effect.     We performed a posterior medial approach to the ankle.  This was overlying the posterior tib tendon, slightly more posterior than our typical medial approach to the ankle.  The incision was extended more proximal and typical to allow mobilization of the soft tissues in order to access the posterior aspect of the tibia into treat that aspect of the fracture.  It curved gently anteriorly along the course of the posterior tib tendon at the tip of the medial malleolus.    Skin incised with scalpel.  Hemostasis achieved as needed with electrocautery.  Meticulous soft tissue technique utilized.  Sharply incised down to the fascia.  Saphenous vein and nerve were anterior to our plane of dissection, were identified, protected, retracted as needed.  Posterior tibialis tendon was identified and protected throughout the course of the operation.  We identified the medial aspect the distal tibia periosteum, which was largely intact.  We gauged our fracture lines based on preoperative imaging and then split this periosteum in longitudinal fashion and peeled back both anteriorly and posteriorly to identify the fracture site, we also extended it distally along the tip of the medial malleolus to allow appropriate seating of our plate later in the case.  We only peeled off enough periosteum to allow reduction of the fracture and later plate placement.  This allowed us to book open the medial malleolus fracture to some degree and gain access to the depressed articular surface of the central distal tibia.  Once we are confident that we could get access to this depressed joint, we turned our attention to the posterior aspect of the ankle.    Through a separate fascial plane, posterior to the posterior tibialis tendon, we bluntly and sharply dissected with the use of scissors.  Hemostasis achieved as needed with electrocautery.  We took care to protect the underlying neurovascular structures and tendon.  We  worked between the posterior tib and neurovascular structures.  We worked our way distally to the articular surface, and identified the posterior aspect of the distal tibia.  Once again the periosteum here was relatively undisturbed.  We used a Cohen to dissect proximally pilon muscle belly as needed to allow later plate placement.  We then worked through both of the fascial planes, further identified the fracture extending along the posterior distal aspect of the tibia, and then incised the periosteum overlying the fracture to allow us to gauge the reduction.  We then contoured a 1/3 tubular plate appropriately, it was pinned in place proximally and distally.  We then placed a bicortical 3.5 mm apex screw to suck the plate down which it did nicely, and it also reduced the posterior malleolar aspect of the fracture.    At this point we returned to the reduction of the articular surface.  We worked through the medial fracture, inserted a [place], and under fluoroscopic guidance we are able to correct the articular depression.  This was backfilled with allograft bone chips.  We then placed a wire through a percutaneous stab incision from anterior to posterior, guided by fluoroscopy, to support our articular reduction.  We took care to ensure that this wire did not engaged the medial malleolar fracture.    We then returned our attention to the medial malleolus fracture.  It was reduced with a ball spike pusher and pointed reduction clamp.  Once we are satisfied the with the reduction, we held it in place with a temporary wire placed with the tip of the medial malleolus. We then selected the appropriate sized 1/3 tubular plate.  It was contoured appropriately.  We placed a proximal and distal wire to hold it in place.  We then placed a bicortical apex screw from medial to lateral which sucked the plate down nicely and held our reduction.  We placed another bicortical screws through the proximal hole in the plate for added  fixation     We then returned our attention to the posterior malleolus/posterior distal tibia articular fracture.  We placed another proximal screw along shaft for further fixation of the plate.  Distally we placed a 3.5 mm cortical lag screw through distal aspect of the plate for further compression of the fracture.    We then returned to the medial distal tibia.  We placed a 3.5 mm cortical position screw through the distal tip of the plate for bicortical fixation.    In order to support the depressed articular surface, we removed the temporary wire placed from anterior to posterior, and then placed a 3.5 mm cortical screw in its place.  Fluoroscopy confirmed appropriate hardware placement.    Next we turned our attention to open reduction internal fixation of the distal fibula fracture.  It was a transverse Alvarez a type fracture.  We planned for intramedullary screw fixation.  We used 2 fluoroscopy machines.  We made a small incision distal to the tip of the fibula, bluntly dissected down to bone.  We then used a long 2.5 mm drill bit, entered it through the tip of the fibula, across the fracture site and guided it to the fibular shaft.  We then placed a long 3.5 mm cortical screw, stainless steel, along the path of the drill bit engaging the distal fibula fracture, and safely passing it into the proximal fibular shaft.  Appropriate screw placement and fracture reduction were confirmed on fluoroscopy.    The initially placed medial apex screw was exchanged out for a slightly shorter screw to ensure that it did not engage in the syndesmosis articulation    We performed external rotation stress test which did not indicate syndesmotic widening    Wounds irrigated with saline.  Hemostasis achieved as needed with electrocautery.  1 g vancomycin and 1.2 g tobramycin placed deep.  Fascia closed with 2 O Vicryl.  Deep tissue closed with 2 O Vicryl.  Subcutaneous tissue closed with 3 O Vicryl.  Skin closed with 3 O nylon.   Xeroform, dry gauze, cast padding, a short-leg plaster splint with ankle in neutral dorsiflexion applied.     At the conclusion of procedure the patient had soft and compressible compartments, brisk cap refill, palpable DP/PT pulse in the operative extremity.     Prior to final closure all counts were confirmed to be correct.  Patient tolerated the procedure well without any complications, was awoken from anesthesia, transferred PACU for further recovery.     POSTOPERATIVE PLAN:  73-year-old female, diabetes, neuropathy, prior heart attack and stroke with fibromyalgia, restless leg syndrome and multiple medication allergies  Ground level fall 08/13/2024  Right ankle pilon fracture  Right both column acetabular fracture    08/14/2024 - ORIF right ankle pilon fracture    Plan to attempt physical therapy tomorrow  If has difficulty mobilizing, we will plan for operative fixation of her right acetabulum during this admission, possibly Friday     Antibiotics x 24 hr  Continuous oxygen via nasal cannula times 24 hours to aid in incision healing  Eliquis x10 weeks for DVT prophylaxis  Nonweightbearing right lower extremity x 10 weeks postop.     Calcium, vitamin-D  Fragility fracture Clinic referral  Hospitalist comanagement    We will remove splint, change dressing, and place a well-padded short-leg fiberglass cast prior to hospital discharge.  After incision healed, and sutures removed, likely 3 weeks postop, consider placement into a cam boot versus continuing the cast, depending on patient preference, inability to place an remove Cam boot       X-rays at subsequent followups:  Right ankle  Pelvis Judet     Follow-up postop  2-3 weeks - remove cast, remove sutures, consider short-leg cast versus Cam boot, based on patient's preference  6 weeks, 3 months, 6 months, 1 year     =====================  Neto Davalos MD  Orthopaedic Surgery

## 2024-08-14 NOTE — SUBJECTIVE & OBJECTIVE
Past Medical History:   Diagnosis Date    Allergy     Altered mental status 06/19/2022    DYSARTHRIA, SPASTIC MOVEMENTS & DIFFICULTY SWALLOWING    Anemia     Anxiety     Arthritis     Cataract     both removed    Colon polyps     Coronary artery disease     Depression     Diabetes mellitus, type II     Disorder of kidney and ureter     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/2023    Fibromyalgia     Follicular lymphoma     GERD (gastroesophageal reflux disease)     HTN (hypertension)     Hyperlipidemia     MI (myocardial infarction) 03/2019    Personal history of colonic polyps     Restless leg syndrome     Stroke        Past Surgical History:   Procedure Laterality Date    COLONOSCOPY  11/07/2012    Colon polyp found; repeat in 5 years    ELBOW SURGERY Right 2015    dislocation repair     ESOPHAGOGASTRODUODENOSCOPY  11/07/2012    atrophic gastritis, H pylori testing negative    INCISION AND DRAINAGE FOOT Right 6/2/2021    Procedure: INCISION AND DRAINAGE, FOOT, bone biopsy;  Surgeon: Quiana Penn DPM;  Location: [facility] OR;  Service: Podiatry;  Laterality: Right;    KNEE SURGERY Bilateral 2015    scoped    LEFT HEART CATHETERIZATION Left 3/29/2019    Procedure: Left heart cath;  Surgeon: Bladimir Barbosa MD;  Location: [facility] CATH LAB;  Service: Cardiology;  Laterality: Left;    LEFT HEART CATHETERIZATION Left 11/18/2019    Procedure: Left heart cath;  Surgeon: Karl Rico MD;  Location: [facility] CATH LAB;  Service: Cardiology;  Laterality: Left;    LEFT HEART CATHETERIZATION Left 1/8/2020    Procedure: Left heart cath, right radial, noon start;  Surgeon: Christos Monreal MD;  Location: [facility] CATH LAB;  Service: Cardiology;  Laterality: Left;  RN Pre Op 1-6-20.  To be admitted 1-7-20 sor Aspirin Disensitation    TONSILLECTOMY  1955    ULTRASOUND GUIDANCE  1/8/2020    Procedure: Ultrasound Guidance;  Surgeon: Christos Monreal MD;  Location: [facility] CATH LAB;  Service: Cardiology;;       Review of patient's allergies indicates:    Allergen Reactions    Novolin 70/30 (semi-synthetic) Nausea And Vomiting     Severe vomiting on Relion 70/30    Sulfa (sulfonamide antibiotics) Anaphylaxis    Talwin [pentazocine lactate] Anaphylaxis    Victoza [liraglutide] Nausea And Vomiting    Glipizide Nausea Only    Citric acid     Codeine     Influenza virus vaccines Hives    Iodine and iodide containing products Hives    Levetiracetam Itching    Rituxan [rituximab] Hives    Zoloft [sertraline] Nausea And Vomiting       No current facility-administered medications on file prior to encounter.     Current Outpatient Medications on File Prior to Encounter   Medication Sig    albuterol (PROVENTIL/VENTOLIN HFA) 90 mcg/actuation inhaler Inhale 2 puffs into the lungs every 6 (six) hours as needed for Wheezing or Shortness of Breath.    ASCORBIC ACID, VITAMIN C, ORAL Take by mouth once daily.    aspirin 81 MG Chew Take 1 tablet (81 mg total) by mouth once daily.    atorvastatin (LIPITOR) 80 MG tablet Take 1 tablet by mouth in the evening    Bacillus coagulans (DIGESTIVE ADVANTAGE IMMUNE ORAL) Take by mouth.    cholecalciferol, vitamin D3, (VITAMIN D3) 50 mcg (2,000 unit) Cap Take 2 capsules by mouth 2 (two) times daily.    cyanocobalamin (VITAMIN B-12) 1000 MCG tablet Take 100 mcg by mouth once daily.    DEXCOM G7  Misc Use 1  to track blood glucose, ICD10: E11.65    DEXCOM G7 SENSOR Samina Use 1 sensor every 10 days to track blood glucose, ICD10: E11.65, okay with 90 day supply if possible    diclofenac sodium (VOLTAREN TOP) Apply topically.    EPINEPHrine (EPIPEN) 0.3 mg/0.3 mL AtIn Inject 0.3 mLs (0.3 mg total) into the muscle once. for 1 dose    ESOMEPRAZOLE MAGNESIUM ORAL Take by mouth as needed. (Patient not taking: Reported on 5/28/2024)    FLUoxetine 40 MG capsule Take 1 capsule (40 mg total) by mouth once daily.    gabapentin (NEURONTIN) 300 MG capsule Take 1 capsule (300 mg total) by mouth 3 (three) times daily.    hydrALAZINE (APRESOLINE)  "50 MG tablet Take 1 tablet (50 mg total) by mouth every 8 (eight) hours.    isosorbide mononitrate (IMDUR) 30 MG 24 hr tablet Take 1 tablet (30 mg total) by mouth once daily.    LANTUS SOLOSTAR U-100 INSULIN 100 unit/mL (3 mL) InPn pen Inject 12 Units into the skin every evening.    LIDOcaine (LIDODERM) 5 % Place 1 patch onto the skin once daily. Remove & Discard patch within 12 hours or as directed by MD.    LORazepam (ATIVAN) 1 MG tablet Take 1 tablet (1 mg total) by mouth 2 (two) times daily as needed for Anxiety.    magnesium oxide (MAG-OX) 400 mg tablet Take 400 mg by mouth once daily.    melatonin 10 mg Cap Take 10 mg by mouth nightly.    metoprolol succinate (TOPROL-XL) 25 MG 24 hr tablet Take 1 tablet (25 mg total) by mouth once daily.    multivitamin/iron/folic acid (CENTRUM COMPLETE ORAL) Take by mouth.    NOVOLOG FLEXPEN U-100 INSULIN 100 unit/mL (3 mL) InPn pen Inject 20 Units into the skin 3 (three) times daily with meals.    OZEMPIC 1 mg/dose (4 mg/3 mL) Inject 1 mg into the skin every 7 days.    pantoprazole (PROTONIX) 40 MG tablet Take 1 tablet (40 mg total) by mouth once daily.    pen needle, diabetic (BD ULTRA-FINE SAGAR PEN NEEDLE) 32 gauge x 5/32" Ndle One pen needle use with insulin pen 4 times a day.  ICD-10: E11.9    semaglutide (OZEMPIC) 0.25 mg or 0.5 mg (2 mg/3 mL) pen injector Inject 0.5 mg once weekly thereafter    ticagrelor (BRILINTA) 90 mg tablet Take 1 tablet (90 mg total) by mouth 2 (two) times daily.    vitamin E 1000 UNIT capsule Take 1,000 Units by mouth once daily.     Family History       Problem Relation (Age of Onset)    Allergy (severe) Daughter    Cancer Mother, Father    Diabetes Sister    Heart disease Mother    Hypertension Sister    Lung cancer Brother    No Known Problems Daughter          Tobacco Use    Smoking status: Never    Smokeless tobacco: Never   Substance and Sexual Activity    Alcohol use: Not Currently    Drug use: Never    Sexual activity: Not Currently     " Partners: Male     Review of Systems   Constitutional:  Positive for activity change. Negative for chills and fever.   HENT:  Negative for congestion, hearing loss, rhinorrhea and sore throat.    Eyes:  Negative for visual disturbance.   Respiratory:  Negative for shortness of breath.    Cardiovascular:  Negative for chest pain and leg swelling.   Gastrointestinal:  Negative for abdominal pain, constipation, diarrhea, nausea and vomiting.   Genitourinary:  Negative for dysuria, frequency and urgency.   Musculoskeletal:  Positive for arthralgias and myalgias.   Neurological:  Positive for numbness (chronic).     Objective:     Vital Signs (Most Recent):  Temp: 98 °F (36.7 °C) (08/14/24 0043)  Pulse: 81 (08/14/24 0133)  Resp: 18 (08/14/24 0043)  BP: 138/64 (08/14/24 0133)  SpO2: 99 % (08/14/24 0133) Vital Signs (24h Range):  Temp:  [98 °F (36.7 °C)-98.6 °F (37 °C)] 98 °F (36.7 °C)  Pulse:  [] 81  Resp:  [16-21] 18  SpO2:  [95 %-100 %] 99 %  BP: (114-195)/() 138/64     Weight: 81.6 kg (179 lb 14.3 oz)  Body mass index is 26.57 kg/m².     Physical Exam  Constitutional:       General: She is not in acute distress.     Appearance: Normal appearance. She is not ill-appearing.      Comments: Uncomfortable appearing 2/2 pain   HENT:      Head: Normocephalic and atraumatic.   Eyes:      Extraocular Movements: Extraocular movements intact.   Cardiovascular:      Rate and Rhythm: Normal rate and regular rhythm.      Heart sounds: Murmur heard.      No friction rub. No gallop.   Pulmonary:      Effort: Pulmonary effort is normal.      Breath sounds: Normal breath sounds. No wheezing, rhonchi or rales.   Abdominal:      General: There is no distension.      Tenderness: There is no abdominal tenderness. There is no guarding.   Musculoskeletal:         General: Tenderness and signs of injury present.      Right lower leg: No edema.      Left lower leg: No edema.      Comments: Unable to assess ROM of RLE 2/2 pain  TTP  over R hip  RLE splinted by Ortho  Sensation diminished to toes 2/2 neuropathy (chronic)   Neurological:      General: No focal deficit present.      Mental Status: She is alert and oriented to person, place, and time.                Significant Labs: All pertinent labs within the past 24 hours have been reviewed.  BMP:   Recent Labs   Lab 08/14/24  0220   *   *   K 4.5      CO2 17*   BUN 32*   CREATININE 1.9*   CALCIUM 8.6*   MG 1.5*     CBC:   Recent Labs   Lab 08/13/24  1650 08/14/24  0220   WBC 10.49 11.26   HGB 10.9* 9.9*   HCT 33.2* 30.9*    175       Significant Imaging: I have reviewed all pertinent imaging results/findings within the past 24 hours.    Imaging Results              CT Ankle (Including Hindfoot) Without Contrast Right (In process)                      CT 3D Rendering WO Independent Workstation (In process)                      X-Ray Ankle Complete Right (In process)                      CT Pelvis Without Contrast (Final result)  Result time 08/13/24 20:58:12      Final result by Quiana Chawla MD (08/13/24 20:58:12)                   Impression:      Acute traumatic fractures involving the right iliac wing, the anterior pillar of the right acetabulum and the lateral most aspect of the right superior ramus, and the right inferior pubic ramus.  No hip dislocation.      Electronically signed by: Quiana Chawla  Date:    08/13/2024  Time:    20:58               Narrative:    EXAMINATION:  CT PELVIS WITHOUT CONTRAST    CLINICAL HISTORY:  Pelvic trauma;    TECHNIQUE:  1.25 mm axial images were obtained through the pelvis from above the iliac crest through the upper femoral shafts.    COMPARISON:  Plain hip radiograph 08/13/2020 full    FINDINGS:  There is a minimally displaced fracture of the right iliac wing.  There are comminuted fractures involving the anterior pillar of the right acetabulum and the lateral most aspect of the right superior ramus.  There is  comminuted and overlapping fracture of the right inferior pubic ramus.  The hips are not dislocated.  The SI joints are intact.  There are degenerative changes seen at the sacroiliac joints and the pubic symphysis.  Bones are osteopenic.  Incidental note is made of vascular calcifications and a small fat containing umbilical hernia.                                       CT Lumbar Spine Without Contrast (Final result)  Result time 08/13/24 20:31:47      Final result by Quiana Chawla MD (08/13/24 20:31:47)                   Impression:      No acute lumbar fracture.  Multilevel degenerative change greatest at L4/L5.    Small bilateral pleural effusions right greater than left.    Gallbladder sludge or gravel-like gallstones.      Electronically signed by: Quiana Chawla  Date:    08/13/2024  Time:    20:31               Narrative:    EXAMINATION:  CT OF THE LUMBAR SPINE WITHOUT    CLINICAL HISTORY:  Complaining of right hip pain secondary to fall from standing.    TECHNIQUE:  1.25 mm axial images were obtained through the lumbar spine. Coronal and sagittal reformatted images were provided.    COMPARISON:  None.    FINDINGS:  There is no lumbar vertebral body fracture.  There is grade 1 anterolisthesis of L4 on L5.  At L3/L4, there is moderate central canal narrowing secondary to the facet arthrosis and ligamentum flavum hypertrophy without significant foraminal stenosis.  At L4/L5, there is no stone significant central canal narrowing, but there is bilateral mild foraminal narrowing.  Secondary to ligamentum flavum hypertrophy and facet arthrosis.  At L5/S1, there is a broad-based disc bulge without any significant central canal stenosis or foraminal stenosis.  There is small marginal osteophytes throughout.  Vacuum phenomena is seen at L4/L5 with mild intervertebral disc space narrowing.  Incidental note is made of small bilateral pleural effusions right greater than left and gallbladder sludge or gravel-like  gallstones.                                       X-Ray Chest AP Portable (Final result)  Result time 08/13/24 17:49:32      Final result by Eddie Montilla MD (08/13/24 17:49:32)                   Impression:      1. Interstitial findings may reflect edema versus chronic change, no large focal consolidation.      Electronically signed by: Eddie Montilla MD  Date:    08/13/2024  Time:    17:49               Narrative:    EXAMINATION:  XR CHEST AP PORTABLE    CLINICAL HISTORY:  Chest Pain;    TECHNIQUE:  Single frontal view of the chest was performed.    COMPARISON:  11/03/2023    FINDINGS:  The cardiomediastinal silhouette is prominent, magnified by technique, similar to the previous exam noting calcification of the aorta..  There is no pleural effusion.  The trachea is midline.  The lungs are symmetrically expanded bilaterally with coarse interstitial attenuation in a central hilar distribution..  No large focal consolidation seen.  There is no pneumothorax.  The osseous structures are remarkable for degenerative changes..                                       X-Ray Ankle Complete Right (Final result)  Result time 08/13/24 17:50:39      Final result by Eddie Montilla MD (08/13/24 17:50:39)                   Impression:      1. Distal tibial and fibular fractures as described.      Electronically signed by: Eddie Montilla MD  Date:    08/13/2024  Time:    17:50               Narrative:    EXAMINATION:  XR ANKLE COMPLETE 3 VIEW RIGHT    CLINICAL HISTORY:  Unspecified fall, initial encounter    TECHNIQUE:  AP, lateral, and oblique images of the right ankle were performed.    COMPARISON:  Radiograph of the foot 06/01/2021    FINDINGS:  Three views right ankle.    There is osteopenia.  There is acute appearing fracture involving the distal aspect of the fibula.  There is comminuted fracture of the medial malleolus.  The ankle mortise is intact.  There is edema about the ankle.  The posterior aspect of the  tibia appears intact.  There are degenerative changes of the calcaneus.  There is vascular calcification.                                       X-Ray Hip 2 or 3 views Right with Pelvis when performed (Final result)  Result time 08/13/24 17:53:13      Final result by Eddie Montilla MD (08/13/24 17:53:13)                   Impression:      1. Fractures of the right inferior and superior pubic rami.  2. There is cortical irregularity involving the right iliac wing, concerning for additional fracture.      Electronically signed by: Eddie Montilla MD  Date:    08/13/2024  Time:    17:53               Narrative:    EXAMINATION:  XR HIP WITH PELVIS WHEN PERFORMED 2 OR 3 VIEWS RIGHT    CLINICAL HISTORY:  Unspecified fall, initial encounter    TECHNIQUE:  AP view of the pelvis and frog leg lateral view of the right hip were performed.    COMPARISON:  None    FINDINGS:  Three views right hip.    The bilateral sacroiliac joints are intact.  The pubic symphysis is intact.  There is osteopenia.  There are fractures of the right superior and inferior pubic rami.  The bilateral femoroacetabular joints are intact noting degenerative change.  There is fracture involving the right iliac wing.                                       X-Ray Knee 1 or 2 View Right (Final result)  Result time 08/13/24 17:53:54   Procedure changed from X-Ray Knee 3 View Right     Final result by Eddie Montilla MD (08/13/24 17:53:54)                   Impression:      1. No acute displaced fracture or dislocation of the knee.      Electronically signed by: Eddie Montilla MD  Date:    08/13/2024  Time:    17:53               Narrative:    EXAMINATION:  XR KNEE 1 OR 2 VIEW RIGHT    CLINICAL HISTORY:  Pain;  Unspecified fall, initial encounter    TECHNIQUE:  AP and lateral views of the right knee were performed.    COMPARISON:  11/20/2014    FINDINGS:  Two views right knee.    There is osteopenia.  There are degenerative changes of the knee.  There  is vascular calcification.  No large right knee joint effusion.

## 2024-08-14 NOTE — ASSESSMENT & PLAN NOTE
Creatine stable for now. BMP reviewed- noted Estimated Creatinine Clearance: 30.1 mL/min (A) (based on SCr of 1.9 mg/dL (H)). according to latest data. Based on current GFR, CKD stage is stage 4 - GFR 15-29.  Monitor UOP and serial BMP and adjust therapy as needed. Renally dose meds. Avoid nephrotoxic medications and procedures.  - Cr 1.9 (near most recent baseline)  - daily BMP  - avoid nephrotoxic medications

## 2024-08-14 NOTE — ANESTHESIA PREPROCEDURE EVALUATION
08/14/2024  Lorena Contreras is a 73 y.o., female.    Pre-operative evaluation for Procedure(s) (LRB):  ORIF, ANKLE - RIGHT (Right)    Lorena Contreras is a 73 y.o. female     Patient Active Problem List   Diagnosis    Anxiety    Primary hypertension    Follicular lymphoma    Mixed hyperlipidemia    Cardiomegaly    Type 2 diabetes mellitus with stage 3 chronic kidney disease, with long-term current use of insulin    Mild nonproliferative diabetic retinopathy of both eyes associated with type 2 diabetes mellitus    Coronary artery disease of native artery of native heart with stable angina pectoris    Hypomagnesemia    Stage 3a chronic kidney disease    Pulmonary hypertension -- echo 11/2019    History of myocardial infarction    Peripheral vascular disease in diabetes mellitus -- CLEMENT 05/2019    Iron deficiency anemia    History of TIA (transient ischemic attack)    RLS (restless legs syndrome)    Aortic atherosclerosis    Osteopenia of neck of femur    Chronic diastolic heart failure    Proliferative diabetic retinopathy of left eye associated with type 2 diabetes mellitus, unspecified proliferative retinopathy type    Hypertensive encephalopathy    Status post tooth extraction    Uncontrolled type 2 diabetes mellitus with hyperglycemia, with long-term current use of insulin    Elevated troponin    Acute cystitis    Post herpetic neuralgia    Aortic stenosis    Chronic bronchitis, unspecified chronic bronchitis type    Stage 3b chronic kidney disease    Closed fracture of right iliac wing    Multiple fractures of pelvis    Closed nondisplaced fracture of anterior column of right acetabulum    Transaminitis    Closed trimalleolar fracture of right ankle with routine healing    Closed fracture of superior and inferior rami of right pubis    Acute blood loss anemia       Review of patient's allergies  indicates:   Allergen Reactions    Novolin 70/30 (semi-synthetic) Nausea And Vomiting     Severe vomiting on Relion 70/30    Sulfa (sulfonamide antibiotics) Anaphylaxis    Talwin [pentazocine lactate] Anaphylaxis    Victoza [liraglutide] Nausea And Vomiting    Glipizide Nausea Only    Citric acid     Codeine     Influenza virus vaccines Hives    Iodine and iodide containing products Hives    Levetiracetam Itching    Rituxan [rituximab] Hives    Zoloft [sertraline] Nausea And Vomiting       No current facility-administered medications on file prior to encounter.     Current Outpatient Medications on File Prior to Encounter   Medication Sig Dispense Refill    Bacillus coagulans (DIGESTIVE ADVANTAGE IMMUNE ORAL) Take by mouth.      diclofenac sodium (VOLTAREN TOP) Apply topically.      gabapentin (NEURONTIN) 300 MG capsule Take 1 capsule (300 mg total) by mouth 3 (three) times daily. 90 capsule 1    hydrALAZINE (APRESOLINE) 50 MG tablet Take 1 tablet (50 mg total) by mouth every 8 (eight) hours. 180 tablet 3    LANTUS SOLOSTAR U-100 INSULIN 100 unit/mL (3 mL) InPn pen Inject 12 Units into the skin every evening. 30 mL 6    NOVOLOG FLEXPEN U-100 INSULIN 100 unit/mL (3 mL) InPn pen Inject 20 Units into the skin 3 (three) times daily with meals. 30 mL 8    albuterol (PROVENTIL/VENTOLIN HFA) 90 mcg/actuation inhaler Inhale 2 puffs into the lungs every 6 (six) hours as needed for Wheezing or Shortness of Breath. 18 g 0    ASCORBIC ACID, VITAMIN C, ORAL Take by mouth once daily.      aspirin 81 MG Chew Take 1 tablet (81 mg total) by mouth once daily. 90 tablet 3    atorvastatin (LIPITOR) 80 MG tablet Take 1 tablet by mouth in the evening 90 tablet 3    cholecalciferol, vitamin D3, (VITAMIN D3) 50 mcg (2,000 unit) Cap Take 2 capsules by mouth 2 (two) times daily.      cyanocobalamin (VITAMIN B-12) 1000 MCG tablet Take 100 mcg by mouth once daily.      DEXCOM G7  Misc Use 1  to track blood glucose, ICD10:  "E11.65 1 each 0    DEXCOM G7 SENSOR Samina Use 1 sensor every 10 days to track blood glucose, ICD10: E11.65, okay with 90 day supply if possible 3 each 11    EPINEPHrine (EPIPEN) 0.3 mg/0.3 mL AtIn Inject 0.3 mLs (0.3 mg total) into the muscle once. for 1 dose 0.3 mL 1    ESOMEPRAZOLE MAGNESIUM ORAL Take by mouth as needed. (Patient not taking: Reported on 5/28/2024)      FLUoxetine 40 MG capsule Take 1 capsule (40 mg total) by mouth once daily. 90 capsule 3    isosorbide mononitrate (IMDUR) 30 MG 24 hr tablet Take 1 tablet (30 mg total) by mouth once daily. 90 tablet 3    LIDOcaine (LIDODERM) 5 % Place 1 patch onto the skin once daily. Remove & Discard patch within 12 hours or as directed by MD. 14 patch 5    LORazepam (ATIVAN) 1 MG tablet Take 1 tablet (1 mg total) by mouth 2 (two) times daily as needed for Anxiety. 30 tablet 0    magnesium oxide (MAG-OX) 400 mg tablet Take 400 mg by mouth once daily.      melatonin 10 mg Cap Take 10 mg by mouth nightly.      metoprolol succinate (TOPROL-XL) 25 MG 24 hr tablet Take 1 tablet (25 mg total) by mouth once daily. 90 tablet 3    multivitamin/iron/folic acid (CENTRUM COMPLETE ORAL) Take by mouth.      OZEMPIC 1 mg/dose (4 mg/3 mL) Inject 1 mg into the skin every 7 days. 3 mL 11    pantoprazole (PROTONIX) 40 MG tablet Take 1 tablet (40 mg total) by mouth once daily. 90 tablet 3    pen needle, diabetic (BD ULTRA-FINE SAGAR PEN NEEDLE) 32 gauge x 5/32" Ndle One pen needle use with insulin pen 4 times a day.  ICD-10: E11.9 150 each 11    semaglutide (OZEMPIC) 0.25 mg or 0.5 mg (2 mg/3 mL) pen injector Inject 0.5 mg once weekly thereafter 3 mL 6    ticagrelor (BRILINTA) 90 mg tablet Take 1 tablet (90 mg total) by mouth 2 (two) times daily. 180 tablet 3    vitamin E 1000 UNIT capsule Take 1,000 Units by mouth once daily.         Past Surgical History:   Procedure Laterality Date    COLONOSCOPY  11/07/2012    Colon polyp found; repeat in 5 years    ELBOW SURGERY Right 2015    " "dislocation repair     ESOPHAGOGASTRODUODENOSCOPY  11/07/2012    atrophic gastritis, H pylori testing negative    INCISION AND DRAINAGE FOOT Right 6/2/2021    Procedure: INCISION AND DRAINAGE, FOOT, bone biopsy;  Surgeon: Quiana Penn DPM;  Location: [facility] OR;  Service: Podiatry;  Laterality: Right;    KNEE SURGERY Bilateral 2015    scoped    LEFT HEART CATHETERIZATION Left 3/29/2019    Procedure: Left heart cath;  Surgeon: Bladimir Barbosa MD;  Location: [facility] CATH LAB;  Service: Cardiology;  Laterality: Left;    LEFT HEART CATHETERIZATION Left 11/18/2019    Procedure: Left heart cath;  Surgeon: Karl Rico MD;  Location: [facility] CATH LAB;  Service: Cardiology;  Laterality: Left;    LEFT HEART CATHETERIZATION Left 1/8/2020    Procedure: Left heart cath, right radial, noon start;  Surgeon: Christos Monreal MD;  Location: [facility] CATH LAB;  Service: Cardiology;  Laterality: Left;  RN Pre Op 1-6-20.  To be admitted 1-7-20 sor Aspirin Disensitation    TONSILLECTOMY  1955    ULTRASOUND GUIDANCE  1/8/2020    Procedure: Ultrasound Guidance;  Surgeon: Christos Monreal MD;  Location: [facility] CATH LAB;  Service: Cardiology;;         CBC:  Lab Results   Component Value Date    WBC 11.26 08/14/2024    RBC 3.63 (L) 08/14/2024    HGB 9.9 (L) 08/14/2024    HCT 30.9 (L) 08/14/2024     08/14/2024    MCV 85 08/14/2024    MCH 27.3 08/14/2024    MCHC 32.0 08/14/2024       CMP:   Lab Results   Component Value Date     (L) 08/14/2024    K 4.5 08/14/2024     08/14/2024    CO2 17 (L) 08/14/2024    BUN 32 (H) 08/14/2024    CREATININE 1.9 (H) 08/14/2024     (H) 08/14/2024    MG 1.5 (L) 08/14/2024    PHOS 3.6 08/14/2024    CALCIUM 8.6 (L) 08/14/2024    ALBUMIN 2.4 (L) 08/14/2024    PROT 6.1 08/14/2024    ALKPHOS 188 (H) 08/14/2024    ALT 39 08/14/2024    AST 44 (H) 08/14/2024    BILITOT 0.5 08/14/2024       INR:  No results found for: "PT", "INR", "PROTIME", "APTT"      Diagnostic Studies:      EKG:   Results for " "orders placed or performed during the hospital encounter of 11/03/23   EKG 12-lead    Collection Time: 11/03/23  1:30 PM    Narrative    Test Reason : R06.02,    Vent. Rate : 088 BPM     Atrial Rate : 088 BPM     P-R Int : 166 ms          QRS Dur : 074 ms      QT Int : 402 ms       P-R-T Axes : 071 015 000 degrees     QTc Int : 486 ms    Normal sinus rhythm  Minimal voltage criteria for LVH, may be normal variant  Possible Inferior infarct (cited on or before 14-JAN-2023)  Abnormal ECG  When compared with ECG of 16-AUG-2023 11:07,  Significant changes have occurred  Confirmed by Christos Monreal MD ([id number]) on 11/6/2023 10:29:34 PM    Referred By: MARY   SELF           Confirmed By:Christos Monreal MD        2D Echo:  Results for orders placed or performed during the hospital encounter of 12/16/15   2D Echo w/ Color Flow Doppler   Result Value Ref Range    EF + QEF 55 55 - 65    Mitral Valve Regurgitation TRIVIAL     Diastolic Dysfunction Yes (A)     Est. PA Systolic Pressure 36.48     Tricuspid Valve Regurgitation MILD        Stress Test:   Results for orders placed during the hospital encounter of 11/23/22    Nuclear Stress - Cardiology Interpreted    Interpretation Summary    Normal myocardial perfusion scan. There is no evidence of myocardial ischemia or infarction.    The gated perfusion images showed an ejection fraction of 71% post stress.    The EKG portion of this study is negative for ischemia.    The patient reported no chest pain during the stress test.    There were no arrhythmias during stress.      Pre-op Vitals [08/13/24 1530]   BP Pulse Resp Temp SpO2   (!) 146/82 104 16 36.8 °C (98.2 °F) 100 %      Height Weight BMI (Calculated)     5' 9" 180 lb 26.6         Pre-op Vitals [08/13/24 1530]   BP Pulse Resp Temp SpO2   (!) 146/82 104 16 36.8 °C (98.2 °F) 100 %      Height Weight BMI (Calculated)     5' 9" 180 lb 26.6         Pre-op Assessment          Review of Systems  Cardiovascular:     Hypertension  " Past MI CAD   CABG/stent   Angina         See stent report attached   Cardiovascular Symptoms: Angina       Coronary Artery Disease:          Hx of Myocardial Infarction                  Hypertension         Renal/:  Chronic Renal Disease, CKD        Kidney Function/Disease             Hepatic/GI:     GERD      Gerd          Neurological:   CVA Neuromuscular Disease,   Seizures           Seizure Disorder             CVA - Cerebrovasular Accident               Neuromuscular Disease   Endocrine:  Diabetes    Diabetes                      Psych:  Psychiatric History              LVEDP (Pre): 7  Estimated blood loss: <50 mL     1.  Successful PCI of proximal ramus with drug-eluting stent x1 (2.0 x 6 mm).  IVUS guidance was utilized for this PCI.  Post PCI IVUS demonstrated well-opposed and expanded stent.  MADINA 3 flow pre and post PCI     2.  Successful PCI of circumflex with drug-eluting stent x1 (2.25 x 22 mm).  MADINA 3 flow pre and post PCI        Echo 11/23:    Left Ventricle: The left ventricle is normal in size. Increased wall thickness. Moderate septal thickening. Normal wall motion. There is normal systolic function with a visually estimated ejection fraction of 65 - 70%. Grade I diastolic dysfunction.    Right Ventricle: Normal right ventricular cavity size. Systolic function is normal.    Left Atrium: Left atrium is severely dilated.    Aortic Valve: There is moderate stenosis. Aortic valve area by VTI is 1.02 cm². Aortic valve peak velocity is 2.59 m/s. Mean gradient is 18 mmHg. The dimensionless index is 0.32.    Mitral Valve: There is mild regurgitation.    Tricuspid Valve: There is mild regurgitation.    Pulmonary Artery: The estimated pulmonary artery systolic pressure is 35 mmHg.    IVC/SVC: Normal venous pressure at 3 mmHg.    Physical Exam  General: Well nourished    Airway:  Mallampati: I / I  Mouth Opening: Normal  TM Distance: Normal  Tongue: Normal  Neck ROM: Normal  ROM    Dental:  Intact    Chest/Lungs:  Clear to auscultation    Heart:  Rate: Normal  Rhythm: Regular Rhythm  Sounds: Normal        Anesthesia Plan  Type of Anesthesia, risks & benefits discussed:    Anesthesia Type: MAC, Gen ETT, Gen Supraglottic Airway, Gen Natural Airway, Regional  Intra-op Monitoring Plan: Standard ASA Monitors  Post Op Pain Control Plan: multimodal analgesia and peripheral nerve block  Induction:  IV  Informed Consent: Informed consent signed with the Patient and all parties understand the risks and agree with anesthesia plan.  All questions answered.   ASA Score: 3  Day of Surgery Review of History & Physical: H&P Update referred to the surgeon/provider.    Ready For Surgery From Anesthesia Perspective.     .

## 2024-08-14 NOTE — HPI
Lorena Contreras is 74 y/o with PMHx of DM2, Fibromyalgia, HTN, HLD, CVA, MI, and CAD presents to ED via EMS as a [place] transfer for a fall. Pt was walking into grocery store when she twisted her ankle and fell despite attempts to catch herself. Denies LOC or head trauma. Patient fell on her right side. Had immediate pain to her ankle and hip. Pt was unable to ambulate due to pain. Pain is worse in her ankle. States it has subsided s/p pain medications. Pain has been exacerbated when moving. Had an episode of vomiting while splinting ankle but otherwise no further episodes. Denies fever, chills, chest pain, SOB, abdominal pain and urinary symptoms. Has numbness/ tingling to bilateral feet which she states is chronic 2/2 neuropathy.    In the ED: Afebrile, VSS. H&H 10.9 & 33.2. Cr 1.9. Glucose 348. LFTs AST 50, ALT 45. . Urine and drug toxicology screening pending. Chest Xray impression was interstitial findings may reflect edema versus chronic change, no large focal consolidation. Right Ankle Xray impression was distal tibial and fibular fractures as described. Right Hip Xray impression was fractures of the right inferior and superior pubic rami. There is cortical irregularity involving the right iliac wing, concerning for additional fracture. Right Knee Xray showed  no acute displaced fracture or dislocation of the knee. CT Pelvis showed acute traumatic fractures involving the right iliac wing, the anterior pillar of the right acetabulum and the lateral most aspect of the right superior ramus, and the right inferior pubic ramus. No hip dislocation. CT Lumbar impression was There is no lumbar vertebral body fracture. There is grade 1 anterolisthesis of L4 on L5. At L3/L4, there is moderate central canal narrowing secondary to the facet arthrosis and ligamentum flavum hypertrophy without significant foraminal stenosis. At L4/L5, there is no stone significant central canal narrowing, but there is  bilateral mild foraminal narrowing. Secondary to ligamentum flavum hypertrophy and facet arthrosis. At L5/S1, there is a broad-based disc bulge without any significant central canal stenosis or foraminal stenosis. Pain medications given in the ED. Ortho consulted. Admitted to .

## 2024-08-14 NOTE — ANESTHESIA PROCEDURE NOTES
Right Saphenous Single Injection    Patient location during procedure: pre-op   Block not for primary anesthetic.  Reason for block: at surgeon's request and post-op pain management   Post-op Pain Location: Right ankle pain   Start time: 8/14/2024 11:30 AM  Timeout: 8/14/2024 11:25 AM   End time: 8/14/2024 11:32 AM    Staffing  Authorizing Provider: Lyric Sandhu MD  Performing Provider: Roberto Mtz DO    Staffing  Performed by: Roberto Mtz DO  Authorized by: Lyric Sandhu MD    Preanesthetic Checklist  Completed: patient identified, IV checked, site marked, risks and benefits discussed, surgical consent, monitors and equipment checked, pre-op evaluation and timeout performed  Peripheral Block  Patient position: supine  Prep: ChloraPrep  Patient monitoring: heart rate, cardiac monitor, continuous pulse ox, continuous capnometry and frequent blood pressure checks  Block type: saphenous  Laterality: right  Injection technique: single shot  Needle  Needle type: Stimuplex   Needle gauge: 21 G  Needle length: 4 in  Needle localization: anatomical landmarks and ultrasound guidance   -ultrasound image captured on disc.  Assessment  Injection assessment: negative aspiration, negative parasthesia and local visualized surrounding nerve  Paresthesia pain: none  Heart rate change: no  Slow fractionated injection: yes    Medications:    Medications: bupivacaine (pf) (MARCAINE) injection 0.75% - Perineural   10 mL - 8/14/2024 11:32:00 AM    Additional Notes  VSS.  See IntraOp record for vitals. Patient tolerated procedure well.

## 2024-08-14 NOTE — MEDICAL/APP STUDENT
David Mijares - Surgery ([facility])  H&P    Patient Name: Lorena Contreras  MRN: [id number]  Patient Class: IP- Inpatient   Admission Date: 8/13/2024  Length of Stay: 0 days  Attending Physician: Daniela Abernathy MD  Primary Care Provider: Jorge Paris MD    Subjective:     CC: R Ankle, Pelvic Ring fractures    HPI:   Ms. Lorena Contreras is a very pleasant 73yoF with PMHx T2DM with chronic bilateral neuropathy, fibromyalgia, HTN, HLD, Chronic diastolic HF, MI, CVA, PVD, CAD, CKD, GERD, and remote history of follicular lymphoma (2009). She presents as a transfer from [facility] for orthopedic evaluation of R ankle and pelvis after fall from standing height in the grocery store in the evening 8/13.  Describes twisting her ankle, being unable to catch herself, and falling to her right. Denies head strike; denies LOC. Felt immediate pain in her right ankle and hip and was unable to ambulate. Pain was worst in her ankle, and aggravated by movement. Denies prior falls. She walks unassited at baseline. Lives with her grandson, but completes most ADLs independently.    ED Course:  Labs significant for Hgb 10.9, Hct 33.2, eGFR 27.5, Cr 1.9 (CKD4, baseline Cr ~1.2-1.5, 1.8 in July), Gluc 348, AST 50, ALT 45, , Troponin 0.013. UA positive for protein, glucose, blood, leukocytes (1+), WBCs, and scant bacteria. CXR demonstrating mild edema vs. Chronic changes (no significant change from prior imaging). R ankle XR with fractures of the distal tibia and fibula. R Hip XR with fractures of the inferior and superior pubic rami as well as suspicion for fracture of the illiac wing. Better demonstrated on CT pelvis, which showed fractures of the R iliac wing, anterior pillar of the R acetabulum, lateral superior ramus, and R inferior pubic ramus. [place] [facility] [place] plan for surgery 8/14.     Interval History:  R Ankle ORIF by Dr. Davalos today (8/14). Plan for surgical fixation of the pelvis 8/16.     Review of  "Systems   Constitutional:  Negative for chills, fever and malaise/fatigue.   HENT: Negative.     Eyes: Negative.    Respiratory:  Negative for cough, shortness of breath and wheezing.    Cardiovascular:  Negative for chest pain and palpitations.   Gastrointestinal:  Negative for diarrhea, nausea and vomiting.   Musculoskeletal:  Positive for back pain (Pelvic fx) and joint pain (R ankle s/p ORIF).   Skin:  Positive for itching.   Neurological:  Sensory change: chronic peripheral neuropathy.     Objective:     Vitals:    08/14/24 0345 08/14/24 0800 08/14/24 0913 08/14/24 0915   BP:  (!) 175/79 (!) 186/78 (!) 186/78   BP Location:  Left arm Left arm    Patient Position:  Lying Lying    Pulse:  86 79    Resp: 18 20 18    Temp:  97.8 °F (36.6 °C) 97.1 °F (36.2 °C)    TempSrc:  Axillary Temporal    SpO2:  98%     Weight:   81.6 kg (180 lb)    Height:   5' 9" (1.753 m)      Physical Exam  Constitutional:       General: She is not in acute distress.     Comments: Groggy after anesthesia   Cardiovascular:      Rate and Rhythm: Normal rate and regular rhythm.   Pulmonary:      Effort: Pulmonary effort is normal. No respiratory distress.      Breath sounds: Normal breath sounds. No wheezing.   Musculoskeletal:         General: Deformity and signs of injury present.      Comments: R ankle s/p ORIF in post-op splint. Good cap refil in toes. Patient still groggy from anesthesia, nerve block likely still active. Cannot yet move or feel toes.    Neurological:      Sensory: Sensory deficit: bilateral peripheral neuropathy.      Gait: Gait abnormal.       Recent Labs   Lab 08/14/24 0220   CALCIUM 8.6*   ALBUMIN 2.4*   PROT 6.1   *   K 4.5   CO2 17*      BUN 32*   CREATININE 1.9*   ALKPHOS 188*   ALT 39   AST 44*   BILITOT 0.5     Recent Labs   Lab 08/14/24 0220   WBC 11.26   RBC 3.63*   HGB 9.9*   HCT 30.9*      MCV 85   MCH 27.3   MCHC 32.0     Mg = 1.5  Phos = 3.6  AST = 44  ALT = 39    Glucose = 294  POCT = " 313    IMAGING:                               CXR 8/13:  Impression:   1. Interstitial findings may reflect edema versus chronic change, no large focal consolidation.     XR R Ankle 8/13:  Findings: There is osteopenia. There is acute appearing fracture involving the distal aspect of the fibula. There is comminuted fracture of the medial malleolus. The ankle mortise is intact. There is edema about the ankle. The posterior aspect of the tibia appears intact. There are degenerative changes of the calcaneus. There is vascular calcification.     XR Ankle 8/14 (pre-op)  Findings: Exam quality is limited by suboptimal positioning and overlapping cast material. Acute fractures of the distal tibia and distal fibula as seen previously. Bones are demineralized. No gross dislocation or significant worsening alignment of fracture fragments. Soft tissue edema.     CT Ankle 8/14 (pre-op)  Findings: Bones are demineralized. There is a cast in place. There is mildly displaced comminuted distal tibial fracture and a minimally displaced distal fibular fracture with medial angulation of the fracture fragment. Intra-articular extension fracture fragments which are mildly displaced involving the distal tibia near anatomic alignment of fracture fragments. Fractures involve the posterior and medial malleoli. No dislocation. There are a few foci of subcutaneous gas overlying the tibial fracture, possibly relating to trauma or reduction procedure though correlation is advised. Dense calcific tendinosis of the posterior tibial tendon. No full-thickness injury of the Achilles tendon. Soft tissue edema about the ankle. Prominent vascular calcifications noted.   Impression: Near anatomic alignment of complex mildly displaced distal tibia fracture and mildly displaced distal fibular fracture.     XR R Knee 8/13:  Findings: There is osteopenia. There are degenerative changes of the knee. There is vascular calcification. No large right knee joint  effusion.     XR R Hip 8/13:  Findings: The bilateral sacroiliac joints are intact. The pubic symphysis is intact. There is osteopenia. There are fractures of the right superior and inferior pubic rami. The bilateral femoroacetabular joints are intact noting degenerative change. There is fracture involving the right iliac wing.     CT Pelvis 8/13:  Findings: There is a minimally displaced fracture of the right iliac wing. There are comminuted fractures involving the anterior pillar of the right acetabulum and the lateral most aspect of the right superior ramus. There is comminuted and overlapping fracture of the right inferior pubic ramus. The hips are not dislocated. The SI joints are intact. There are degenerative changes seen at the sacroiliac joints and the pubic symphysis. Bones are osteopenic. Incidental note is made of vascular calcifications and a small fat containing umbilical hernia.   Impression: Acute traumatic fractures involving the right iliac wing, the anterior pillar of the right acetabulum and the lateral most aspect of the right superior ramus, and the right inferior pubic ramus. No hip dislocation.     CT L-Spine 8/13:   Findings: There is no lumbar vertebral body fracture. There is grade 1 anterolisthesis of L4 on L5. At L3/L4, there is moderate central canal narrowing secondary to the facet arthrosis and ligamentum flavum hypertrophy without significant foraminal stenosis. At L4/L5, there is no stone significant central canal narrowing, but there is bilateral mild foraminal narrowing. Secondary to ligamentum flavum hypertrophy and facet arthrosis. At L5/S1, there is a broad-based disc bulge without any significant central canal stenosis or foraminal stenosis. There is small marginal osteophytes throughout. Vacuum phenomena is seen at L4/L5 with mild intervertebral disc space narrowing. Incidental note is made of small bilateral pleural effusions right greater than left and gallbladder sludge or  gravel-like gallstones.   Impression: No acute lumbar fracture.  Multilevel degenerative change greatest at L4/L5. Small bilateral pleural effusions right greater than left. Gallbladder sludge or gravel-like gallstones.    Assessment/Plan:     Ms. Lorena Contreras is a very pleasant 73yoF who presents after mechanical fall from standing height, with resulting R ankle trimalleolar fracture and R pelvic ring fracture. S/p R ankle ORIF 8/14, with pelvic fixation planned for 8/16.     **R Ankle Medial Malleolus & Distal Fibula Fracture  [CT: There is acute appearing fracture involving the distal aspect of the fibula. There is comminuted fracture of the medial malleolus.]  Soft tissue swelling is not sufficient to necessitate stepped procedure with intermediate ex-fix; OR today for definitive surgical fixation. Currently s/p ORIF with post-op splint. Plan to change to short-leg fiberglass cast prior to d/c with consideration for CAM walker at 3wk post-op.   - NPO since admit   Restart diet post op (Diabetic + Renal diet).   - NWB RLE  - Pain management with multimodals and narcotics PRN for breakthrough pain  - Benoit placed  - PT/OT referral for evaluation post-op, pending restrictions per orthopedics.  Recommendations appreciated.    > Ortho restrictions: NWB RLE  - Patient is not anticoagulated on baseline; She is on antiplatelets, and may require TXA intraoperatively.   - Hold Ozempic for surgery.  - DVT Prophylaxis:   > Plan to return to theater for surgical fixation of the pelvis, which is expected to be open with risk of blood loss. Will plan for heparin as prophylaxis in the interim. After second surgery, will start eliwuis x 10 weeks while patient is NWB.   > 8/14-8/16: Heparin 5000 subcut TID  > HOLD Heparin 6 hours prior to surgery 8/16  > Post op 8/16: eliquis x 10 weeks post op    ** R Pelvic Ring Fracture  [CT: Acute traumatic fractures involving the right iliac wing, the anterior pillar of the right  acetabulum and the lateral most aspect of the right superior ramus, and the right inferior pubic ramus.]  Plan for surgical fixation 8/16 per Ortho.   - f/u ortho recs  - pain management with multimodals and narcotics PRN for breakthrough pain  - NPO at midnight Thursday per current plan  - hold heparin 6 hours prior to surgery.   - eliquis x 10 weeks post op for DVT prophylaxis (patient will be BWB RLE for 10 weeks per ankle protocols).     Abnormal UA  UA equivocal for UTI vs fallout from CKD. Culture pending. Will monitor symptoms and wait for results before initiating antibiotic treatment.   - f/u urine cultures    Hypomagnesemia  Mg 1.5 on admit. Will replete with goal of 2.0.     Anemia  Hgb 9.9, Hct 30.9. On review of chart baseline appears to be 11-12 Hgb. Likely associated with recent trauma and subsequent surgery. Currently asymptomatic. Taking vitamin supplementation at home including iron, folic acid and B12. MCV WNL (85). Will monitor closely with daily CBC.   - daily CBC  - Continue home vitamin supplementation  - transfusion if H/H drops below 7/21, or patient becomes hemodynamically unstable, or symptomatic.     T2DM  Most Recent A1C 8.2 (July '24). Home regimen 12 units lantus daily and 20 units novolog tid with meals.   - hold home antihyperglycemics while in hospital  - Hold novolog while patient NPO. When eating, 16u novolog tid with meals  - lantus 10 units daily  - hold ozempic while awaiting surgery due to risk of gastric retention.     Peripheral Neuropathy  Chronic. 2/2 T2DM. Well controlled on home regimen.   - Continue home gabapentin 300mg TID. May increase dosage as needed to help cover post-operative nerve pain as well.      CKD-4  eGFR 27.5, which has been her typical since July. Cr 1.9, which has also been her baseline since July. Baseline prior 1.2-1.5. May represent new baseline, but given concomitant anemia, will monitor closely for potential KYA.   Bicarb 17. Chronically low per  chart review. Likely 2/2 decreased kidney function. Not on replacement. Data shows that low bicarb can worsen kidney function and hasten progression of CKD. Will start on supplementation inpatient, to be continued long term.   - Daily CMP  - sodium bicarbonate 650mg PO BID, to be continued long term. May adjust dose as needed.     HTN  Most recent blood pressures reviewed. She has been hypertensive since admission.   - continue home hydralazine  - continue home metoprolol 25mg    HLD  - continue home atorvastatin    Heavenly Mijares - Surgery (2nd Fl)

## 2024-08-14 NOTE — ED PROVIDER NOTES
History:  Lorena Contreras is a 73 y.o. female who presents to the ED with Fall (Arrived via EMS, c/o right hip pain, secondary to a fall from standing. Pt denies any LOC or any other pain. Pt reports falling due to twisting her ankle and losing balance. ) and Transfer (Sent from [facility] for ortho consult for right tib/fib fracture and pelvic fracture after fall.)    Described as 73-year-old female with a history of T2DM, fibromyalgia, HTN, HLD, stroke, MI, and CAD presenting for orthopedic evaluation of pelvic fractures and ankle fracture following mechanical fall. Did not hit head, no LOC.     Review of Systems: All systems reviewed and are negative except as per history of present illness.    Medications:   Previous Medications    ALBUTEROL (PROVENTIL/VENTOLIN HFA) 90 MCG/ACTUATION INHALER    Inhale 2 puffs into the lungs every 6 (six) hours as needed for Wheezing or Shortness of Breath.    ASCORBIC ACID, VITAMIN C, ORAL    Take by mouth once daily.    ASPIRIN 81 MG CHEW    Take 1 tablet (81 mg total) by mouth once daily.    ATORVASTATIN (LIPITOR) 80 MG TABLET    Take 1 tablet by mouth in the evening    BACILLUS COAGULANS (DIGESTIVE ADVANTAGE IMMUNE ORAL)    Take by mouth.    CHOLECALCIFEROL, VITAMIN D3, (VITAMIN D3) 50 MCG (2,000 UNIT) CAP    Take 2 capsules by mouth 2 (two) times daily.    CYANOCOBALAMIN (VITAMIN B-12) 1000 MCG TABLET    Take 100 mcg by mouth once daily.    DEXCOM G7  MISC    Use 1  to track blood glucose, ICD10: E11.65    DEXCOM G7 SENSOR HILDA    Use 1 sensor every 10 days to track blood glucose, ICD10: E11.65, okay with 90 day supply if possible    DICLOFENAC SODIUM (VOLTAREN TOP)    Apply topically.    EPINEPHRINE (EPIPEN) 0.3 MG/0.3 ML ATIN    Inject 0.3 mLs (0.3 mg total) into the muscle once. for 1 dose    ESOMEPRAZOLE MAGNESIUM ORAL    Take by mouth as needed.    FLUOXETINE 40 MG CAPSULE    Take 1 capsule (40 mg total) by mouth once daily.    GABAPENTIN (NEURONTIN)  "300 MG CAPSULE    Take 1 capsule (300 mg total) by mouth 3 (three) times daily.    HYDRALAZINE (APRESOLINE) 50 MG TABLET    Take 1 tablet (50 mg total) by mouth every 8 (eight) hours.    ISOSORBIDE MONONITRATE (IMDUR) 30 MG 24 HR TABLET    Take 1 tablet (30 mg total) by mouth once daily.    LANTUS SOLOSTAR U-100 INSULIN 100 UNIT/ML (3 ML) INPN PEN    Inject 12 Units into the skin every evening.    LIDOCAINE (LIDODERM) 5 %    Place 1 patch onto the skin once daily. Remove & Discard patch within 12 hours or as directed by MD.    LORAZEPAM (ATIVAN) 1 MG TABLET    Take 1 tablet (1 mg total) by mouth 2 (two) times daily as needed for Anxiety.    MAGNESIUM OXIDE (MAG-OX) 400 MG TABLET    Take 400 mg by mouth once daily.    MELATONIN 10 MG CAP    Take 10 mg by mouth nightly.    METOPROLOL SUCCINATE (TOPROL-XL) 25 MG 24 HR TABLET    Take 1 tablet (25 mg total) by mouth once daily.    MULTIVITAMIN/IRON/FOLIC ACID (CENTRUM COMPLETE ORAL)    Take by mouth.    NOVOLOG FLEXPEN U-100 INSULIN 100 UNIT/ML (3 ML) INPN PEN    Inject 20 Units into the skin 3 (three) times daily with meals.    OZEMPIC 1 MG/DOSE (4 MG/3 ML)    Inject 1 mg into the skin every 7 days.    PANTOPRAZOLE (PROTONIX) 40 MG TABLET    Take 1 tablet (40 mg total) by mouth once daily.    PEN NEEDLE, DIABETIC (BD ULTRA-FINE SAGAR PEN NEEDLE) 32 GAUGE X 5/32" NDLE    One pen needle use with insulin pen 4 times a day.  ICD-10: E11.9    SEMAGLUTIDE (OZEMPIC) 0.25 MG OR 0.5 MG (2 MG/3 ML) PEN INJECTOR    Inject 0.5 mg once weekly thereafter    TICAGRELOR (BRILINTA) 90 MG TABLET    Take 1 tablet (90 mg total) by mouth 2 (two) times daily.    VITAMIN E 1000 UNIT CAPSULE    Take 1,000 Units by mouth once daily.       PMH:   Past Medical History:   Diagnosis Date    Allergy     Altered mental status 06/19/2022    DYSARTHRIA, SPASTIC MOVEMENTS & DIFFICULTY SWALLOWING    Anemia     Anxiety     Arthritis     Cataract     both removed    Colon polyps     Coronary artery disease  "    Depression     Diabetes mellitus, type II     Disorder of kidney and ureter     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/2023    Fibromyalgia     Follicular lymphoma     GERD (gastroesophageal reflux disease)     HTN (hypertension)     Hyperlipidemia     MI (myocardial infarction) 03/2019    Personal history of colonic polyps     Restless leg syndrome     Stroke      PSH:   Past Surgical History:   Procedure Laterality Date    COLONOSCOPY  11/07/2012    Colon polyp found; repeat in 5 years    ELBOW SURGERY Right 2015    dislocation repair     ESOPHAGOGASTRODUODENOSCOPY  11/07/2012    atrophic gastritis, H pylori testing negative    INCISION AND DRAINAGE FOOT Right 6/2/2021    Procedure: INCISION AND DRAINAGE, FOOT, bone biopsy;  Surgeon: Quiana Penn DPM;  Location: [facility] OR;  Service: Podiatry;  Laterality: Right;    KNEE SURGERY Bilateral 2015    scoped    LEFT HEART CATHETERIZATION Left 3/29/2019    Procedure: Left heart cath;  Surgeon: Bladimir Barbosa MD;  Location: [facility] CATH LAB;  Service: Cardiology;  Laterality: Left;    LEFT HEART CATHETERIZATION Left 11/18/2019    Procedure: Left heart cath;  Surgeon: Karl Rico MD;  Location: [facility] CATH LAB;  Service: Cardiology;  Laterality: Left;    LEFT HEART CATHETERIZATION Left 1/8/2020    Procedure: Left heart cath, right radial, noon start;  Surgeon: Christos Monreal MD;  Location: [facility] CATH LAB;  Service: Cardiology;  Laterality: Left;  RN Pre Op 1-6-20.  To be admitted 1-7-20 sor Aspirin Disensitation    TONSILLECTOMY  1955    ULTRASOUND GUIDANCE  1/8/2020    Procedure: Ultrasound Guidance;  Surgeon: Christos Monreal MD;  Location: [facility] CATH LAB;  Service: Cardiology;;     Allergies: She is allergic to novolin 70/30 (semi-synthetic), sulfa (sulfonamide antibiotics), talwin [pentazocine lactate], victoza [liraglutide], glipizide, citric acid, codeine, influenza virus vaccines, iodine and iodide containing products, levetiracetam, rituxan [rituximab], and zoloft  "[sertraline].  Social History: Marital Status: . She  reports that she has never smoked. She has never used smokeless tobacco.. She  reports that she does not currently use alcohol..       Exam:  VITAL SIGNS:   Vitals:    08/13/24 2032 08/13/24 2054 08/13/24 2309 08/14/24 0003   BP: 133/63   (!) 142/88   BP Location:       Pulse: 86 85  90   Resp: (!) 21 20 20 18   Temp:    98.6 °F (37 °C)   TempSrc:    Oral   SpO2: 99% 100%  100%   Weight:    81.6 kg (179 lb 14.3 oz)   Height:    5' 9" (1.753 m)     Const: Awake and alert, NAD  Head: Atraumatic  Eyes: Normal Conjunctiva  ENT: Normal External Ears, Nose and Mouth.  Neck: Full range of motion. No meningismus.  Resp: Normal respiratory effort, No distress  Cardio: Equal and intact distal pulses  Abd: Soft, non tender, non distended.   Skin: No petechiae or rashes  Ext: Posterior short leg splint RLE. Normal distal sensation, good capillary refill. Decreased ROM RLE due to pain.   Neur: Awake and alert, GCS 15, moves BUE equally.   Psych: Normal Mood and Affect    Data:  Results for orders placed or performed during the hospital encounter of 08/13/24   CBC auto differential   Result Value Ref Range    WBC 10.49 3.90 - 12.70 K/uL    RBC 3.92 (L) 4.00 - 5.40 M/uL    Hemoglobin 10.9 (L) 12.0 - 16.0 g/dL    Hematocrit 33.2 (L) 37.0 - 48.5 %    MCV 85 82 - 98 fL    MCH 27.8 27.0 - 31.0 pg    MCHC 32.8 32.0 - 36.0 g/dL    RDW 14.2 11.5 - 14.5 %    Platelets 168 150 - 450 K/uL    MPV 13.3 (H) 9.2 - 12.9 fL    Immature Granulocytes 0.6 (H) 0.0 - 0.5 %    Gran # (ANC) 8.6 (H) 1.8 - 7.7 K/uL    Immature Grans (Abs) 0.06 (H) 0.00 - 0.04 K/uL    Lymph # 1.0 1.0 - 4.8 K/uL    Mono # 0.6 0.3 - 1.0 K/uL    Eos # 0.1 0.0 - 0.5 K/uL    Baso # 0.05 0.00 - 0.20 K/uL    nRBC 0 0 /100 WBC    Gran % 82.3 (H) 38.0 - 73.0 %    Lymph % 9.2 (L) 18.0 - 48.0 %    Mono % 6.1 4.0 - 15.0 %    Eosinophil % 1.3 0.0 - 8.0 %    Basophil % 0.5 0.0 - 1.9 %    Differential Method Automated  "   Comprehensive metabolic panel   Result Value Ref Range    Sodium 135 (L) 136 - 145 mmol/L    Potassium 4.7 3.5 - 5.1 mmol/L    Chloride 107 95 - 110 mmol/L    CO2 19 (L) 23 - 29 mmol/L    Glucose 348 (H) 70 - 110 mg/dL    BUN 29 (H) 8 - 23 mg/dL    Creatinine 1.9 (H) 0.5 - 1.4 mg/dL    Calcium 8.7 8.7 - 10.5 mg/dL    Total Protein 6.1 6.0 - 8.4 g/dL    Albumin 2.6 (L) 3.5 - 5.2 g/dL    Total Bilirubin 0.5 0.1 - 1.0 mg/dL    Alkaline Phosphatase 205 (H) 55 - 135 U/L    AST 50 (H) 10 - 40 U/L    ALT 45 (H) 10 - 44 U/L    eGFR 28 (A) >60 mL/min/1.73 m^2    Anion Gap 9 8 - 16 mmol/L   Troponin I #1   Result Value Ref Range    Troponin I 0.016 0.000 - 0.026 ng/mL   Troponin I #2   Result Value Ref Range    Troponin I 0.013 0.000 - 0.026 ng/mL   B-Type natriuretic peptide (BNP)   Result Value Ref Range     (H) 0 - 99 pg/mL   Ethanol   Result Value Ref Range    Alcohol, Serum <10 <10 mg/dL   Acetaminophen level   Result Value Ref Range    Acetaminophen (Tylenol), Serum <3.0 (L) 10.0 - 20.0 ug/mL     Imaging Results              CT Pelvis Without Contrast (Final result)  Result time 08/13/24 20:58:12      Final result by Quiana Chawla MD (08/13/24 20:58:12)                   Impression:      Acute traumatic fractures involving the right iliac wing, the anterior pillar of the right acetabulum and the lateral most aspect of the right superior ramus, and the right inferior pubic ramus.  No hip dislocation.      Electronically signed by: Quiana Chawla  Date:    08/13/2024  Time:    20:58               Narrative:    EXAMINATION:  CT PELVIS WITHOUT CONTRAST    CLINICAL HISTORY:  Pelvic trauma;    TECHNIQUE:  1.25 mm axial images were obtained through the pelvis from above the iliac crest through the upper femoral shafts.    COMPARISON:  Plain hip radiograph 08/13/2020 full    FINDINGS:  There is a minimally displaced fracture of the right iliac wing.  There are comminuted fractures involving the anterior pillar  of the right acetabulum and the lateral most aspect of the right superior ramus.  There is comminuted and overlapping fracture of the right inferior pubic ramus.  The hips are not dislocated.  The SI joints are intact.  There are degenerative changes seen at the sacroiliac joints and the pubic symphysis.  Bones are osteopenic.  Incidental note is made of vascular calcifications and a small fat containing umbilical hernia.                                       CT Lumbar Spine Without Contrast (Final result)  Result time 08/13/24 20:31:47      Final result by Quiana Chawla MD (08/13/24 20:31:47)                   Impression:      No acute lumbar fracture.  Multilevel degenerative change greatest at L4/L5.    Small bilateral pleural effusions right greater than left.    Gallbladder sludge or gravel-like gallstones.      Electronically signed by: Quiana Chawla  Date:    08/13/2024  Time:    20:31               Narrative:    EXAMINATION:  CT OF THE LUMBAR SPINE WITHOUT    CLINICAL HISTORY:  Complaining of right hip pain secondary to fall from standing.    TECHNIQUE:  1.25 mm axial images were obtained through the lumbar spine. Coronal and sagittal reformatted images were provided.    COMPARISON:  None.    FINDINGS:  There is no lumbar vertebral body fracture.  There is grade 1 anterolisthesis of L4 on L5.  At L3/L4, there is moderate central canal narrowing secondary to the facet arthrosis and ligamentum flavum hypertrophy without significant foraminal stenosis.  At L4/L5, there is no stone significant central canal narrowing, but there is bilateral mild foraminal narrowing.  Secondary to ligamentum flavum hypertrophy and facet arthrosis.  At L5/S1, there is a broad-based disc bulge without any significant central canal stenosis or foraminal stenosis.  There is small marginal osteophytes throughout.  Vacuum phenomena is seen at L4/L5 with mild intervertebral disc space narrowing.  Incidental note is made of  small bilateral pleural effusions right greater than left and gallbladder sludge or gravel-like gallstones.                                       X-Ray Chest AP Portable (Final result)  Result time 08/13/24 17:49:32      Final result by Eddie Montilla MD (08/13/24 17:49:32)                   Impression:      1. Interstitial findings may reflect edema versus chronic change, no large focal consolidation.      Electronically signed by: Eddie Montilla MD  Date:    08/13/2024  Time:    17:49               Narrative:    EXAMINATION:  XR CHEST AP PORTABLE    CLINICAL HISTORY:  Chest Pain;    TECHNIQUE:  Single frontal view of the chest was performed.    COMPARISON:  11/03/2023    FINDINGS:  The cardiomediastinal silhouette is prominent, magnified by technique, similar to the previous exam noting calcification of the aorta..  There is no pleural effusion.  The trachea is midline.  The lungs are symmetrically expanded bilaterally with coarse interstitial attenuation in a central hilar distribution..  No large focal consolidation seen.  There is no pneumothorax.  The osseous structures are remarkable for degenerative changes..                                       X-Ray Ankle Complete Right (Final result)  Result time 08/13/24 17:50:39      Final result by Eddie Montilla MD (08/13/24 17:50:39)                   Impression:      1. Distal tibial and fibular fractures as described.      Electronically signed by: Eddie Montilla MD  Date:    08/13/2024  Time:    17:50               Narrative:    EXAMINATION:  XR ANKLE COMPLETE 3 VIEW RIGHT    CLINICAL HISTORY:  Unspecified fall, initial encounter    TECHNIQUE:  AP, lateral, and oblique images of the right ankle were performed.    COMPARISON:  Radiograph of the foot 06/01/2021    FINDINGS:  Three views right ankle.    There is osteopenia.  There is acute appearing fracture involving the distal aspect of the fibula.  There is comminuted fracture of the medial malleolus.   The ankle mortise is intact.  There is edema about the ankle.  The posterior aspect of the tibia appears intact.  There are degenerative changes of the calcaneus.  There is vascular calcification.                                       X-Ray Hip 2 or 3 views Right with Pelvis when performed (Final result)  Result time 08/13/24 17:53:13      Final result by Eddie Montilla MD (08/13/24 17:53:13)                   Impression:      1. Fractures of the right inferior and superior pubic rami.  2. There is cortical irregularity involving the right iliac wing, concerning for additional fracture.      Electronically signed by: Eddie Montilla MD  Date:    08/13/2024  Time:    17:53               Narrative:    EXAMINATION:  XR HIP WITH PELVIS WHEN PERFORMED 2 OR 3 VIEWS RIGHT    CLINICAL HISTORY:  Unspecified fall, initial encounter    TECHNIQUE:  AP view of the pelvis and frog leg lateral view of the right hip were performed.    COMPARISON:  None    FINDINGS:  Three views right hip.    The bilateral sacroiliac joints are intact.  The pubic symphysis is intact.  There is osteopenia.  There are fractures of the right superior and inferior pubic rami.  The bilateral femoroacetabular joints are intact noting degenerative change.  There is fracture involving the right iliac wing.                                       X-Ray Knee 1 or 2 View Right (Final result)  Result time 08/13/24 17:53:54   Procedure changed from X-Ray Knee 3 View Right     Final result by Eddie Montilla MD (08/13/24 17:53:54)                   Impression:      1. No acute displaced fracture or dislocation of the knee.      Electronically signed by: Eddie Montilla MD  Date:    08/13/2024  Time:    17:53               Narrative:    EXAMINATION:  XR KNEE 1 OR 2 VIEW RIGHT    CLINICAL HISTORY:  Pain;  Unspecified fall, initial encounter    TECHNIQUE:  AP and lateral views of the right knee were performed.    COMPARISON:  11/20/2014    FINDINGS:  Two  views right knee.    There is osteopenia.  There are degenerative changes of the knee.  There is vascular calcification.  No large right knee joint effusion.                                    Labs & Imaging studies were reviewed independently by me.     Medical Decision Makin-year-old female transferred for orthopedic surgery evaluation of pelvic fractures, acetabular fracture, ankle fracture.  Patient admitted to Hospital Medicine for further management, pain control, PT OT.    Clinical Impression:  1. Closed nondisplaced fracture of pelvis, unspecified part of pelvis, initial encounter    2. Chest pain    3. Fall    4. Closed fracture of right ankle, initial encounter                 Jenifer Manning MD  [phone]

## 2024-08-15 ENCOUNTER — ANESTHESIA EVENT (OUTPATIENT)
Dept: SURGERY | Facility: HOSPITAL | Age: 74
DRG: 492 | End: 2024-08-15
Payer: MEDICARE

## 2024-08-15 PROBLEM — S82.871A CLOSED RIGHT PILON FRACTURE: Status: ACTIVE | Noted: 2024-08-14

## 2024-08-15 PROBLEM — S32.401A CLOSED DISPLACED FRACTURE OF RIGHT ACETABULUM: Status: ACTIVE | Noted: 2024-08-14

## 2024-08-15 PROBLEM — E87.20 METABOLIC ACIDOSIS: Status: ACTIVE | Noted: 2024-08-15

## 2024-08-15 PROBLEM — R25.1 TREMOR: Status: ACTIVE | Noted: 2024-08-15

## 2024-08-15 LAB
ABO + RH BLD: NORMAL
ALBUMIN SERPL BCP-MCNC: 2.2 G/DL (ref 3.5–5.2)
ALP SERPL-CCNC: 154 U/L (ref 55–135)
ALT SERPL W/O P-5'-P-CCNC: 20 U/L (ref 10–44)
ANION GAP SERPL CALC-SCNC: 12 MMOL/L (ref 8–16)
AST SERPL-CCNC: 29 U/L (ref 10–40)
BASOPHILS # BLD AUTO: 0.05 K/UL (ref 0–0.2)
BASOPHILS NFR BLD: 0.6 % (ref 0–1.9)
BILIRUB SERPL-MCNC: 0.3 MG/DL (ref 0.1–1)
BLD GP AB SCN CELLS X3 SERPL QL: NORMAL
BUN SERPL-MCNC: 36 MG/DL (ref 8–23)
CALCIUM SERPL-MCNC: 8 MG/DL (ref 8.7–10.5)
CHLORIDE SERPL-SCNC: 105 MMOL/L (ref 95–110)
CO2 SERPL-SCNC: 21 MMOL/L (ref 23–29)
CREAT SERPL-MCNC: 2.2 MG/DL (ref 0.5–1.4)
DIFFERENTIAL METHOD BLD: ABNORMAL
EOSINOPHIL # BLD AUTO: 0.1 K/UL (ref 0–0.5)
EOSINOPHIL NFR BLD: 1.7 % (ref 0–8)
ERYTHROCYTE [DISTWIDTH] IN BLOOD BY AUTOMATED COUNT: 15.2 % (ref 11.5–14.5)
EST. GFR  (NO RACE VARIABLE): 23.1 ML/MIN/1.73 M^2
GLUCOSE SERPL-MCNC: 276 MG/DL (ref 70–110)
HCT VFR BLD AUTO: 27.5 % (ref 37–48.5)
HGB BLD-MCNC: 8.5 G/DL (ref 12–16)
IMM GRANULOCYTES # BLD AUTO: 0.03 K/UL (ref 0–0.04)
IMM GRANULOCYTES NFR BLD AUTO: 0.4 % (ref 0–0.5)
LYMPHOCYTES # BLD AUTO: 1.3 K/UL (ref 1–4.8)
LYMPHOCYTES NFR BLD: 15.9 % (ref 18–48)
MAGNESIUM SERPL-MCNC: 2.2 MG/DL (ref 1.6–2.6)
MCH RBC QN AUTO: 27.2 PG (ref 27–31)
MCHC RBC AUTO-ENTMCNC: 30.9 G/DL (ref 32–36)
MCV RBC AUTO: 88 FL (ref 82–98)
MONOCYTES # BLD AUTO: 0.8 K/UL (ref 0.3–1)
MONOCYTES NFR BLD: 9.8 % (ref 4–15)
NEUTROPHILS # BLD AUTO: 5.9 K/UL (ref 1.8–7.7)
NEUTROPHILS NFR BLD: 71.6 % (ref 38–73)
NRBC BLD-RTO: 0 /100 WBC
PLATELET # BLD AUTO: 136 K/UL (ref 150–450)
PMV BLD AUTO: 13.9 FL (ref 9.2–12.9)
POCT GLUCOSE: 254 MG/DL (ref 70–110)
POCT GLUCOSE: 262 MG/DL (ref 70–110)
POTASSIUM SERPL-SCNC: 4.9 MMOL/L (ref 3.5–5.1)
PROT SERPL-MCNC: 5.7 G/DL (ref 6–8.4)
RBC # BLD AUTO: 3.12 M/UL (ref 4–5.4)
SODIUM SERPL-SCNC: 138 MMOL/L (ref 136–145)
SPECIMEN OUTDATE: NORMAL
WBC # BLD AUTO: 8.2 K/UL (ref 3.9–12.7)

## 2024-08-15 PROCEDURE — 85025 COMPLETE CBC W/AUTO DIFF WBC: CPT | Mod: HCNC

## 2024-08-15 PROCEDURE — 25000003 PHARM REV CODE 250: Mod: HCNC

## 2024-08-15 PROCEDURE — 83735 ASSAY OF MAGNESIUM: CPT | Mod: HCNC

## 2024-08-15 PROCEDURE — 21400001 HC TELEMETRY ROOM: Mod: HCNC

## 2024-08-15 PROCEDURE — 36415 COLL VENOUS BLD VENIPUNCTURE: CPT | Mod: HCNC,XB

## 2024-08-15 PROCEDURE — 36415 COLL VENOUS BLD VENIPUNCTURE: CPT | Mod: HCNC | Performed by: HOSPITALIST

## 2024-08-15 PROCEDURE — 63600175 PHARM REV CODE 636 W HCPCS: Mod: HCNC | Performed by: INTERNAL MEDICINE

## 2024-08-15 PROCEDURE — 86901 BLOOD TYPING SEROLOGIC RH(D): CPT | Mod: HCNC

## 2024-08-15 PROCEDURE — 97530 THERAPEUTIC ACTIVITIES: CPT | Mod: HCNC

## 2024-08-15 PROCEDURE — 97165 OT EVAL LOW COMPLEX 30 MIN: CPT | Mod: HCNC

## 2024-08-15 PROCEDURE — 86850 RBC ANTIBODY SCREEN: CPT | Mod: HCNC

## 2024-08-15 PROCEDURE — 86900 BLOOD TYPING SEROLOGIC ABO: CPT | Mod: HCNC

## 2024-08-15 PROCEDURE — 25000003 PHARM REV CODE 250: Mod: HCNC | Performed by: HOSPITALIST

## 2024-08-15 PROCEDURE — 25000003 PHARM REV CODE 250: Mod: HCNC | Performed by: INTERNAL MEDICINE

## 2024-08-15 PROCEDURE — 97535 SELF CARE MNGMENT TRAINING: CPT | Mod: HCNC

## 2024-08-15 PROCEDURE — 80053 COMPREHEN METABOLIC PANEL: CPT | Mod: HCNC

## 2024-08-15 PROCEDURE — 36415 COLL VENOUS BLD VENIPUNCTURE: CPT | Mod: HCNC

## 2024-08-15 PROCEDURE — 63600175 PHARM REV CODE 636 W HCPCS: Mod: HCNC | Performed by: HOSPITALIST

## 2024-08-15 PROCEDURE — 63600175 PHARM REV CODE 636 W HCPCS: Mod: HCNC

## 2024-08-15 PROCEDURE — 97161 PT EVAL LOW COMPLEX 20 MIN: CPT | Mod: HCNC

## 2024-08-15 PROCEDURE — 97112 NEUROMUSCULAR REEDUCATION: CPT | Mod: HCNC

## 2024-08-15 PROCEDURE — 30200315 PPD INTRADERMAL TEST REV CODE 302: Mod: HCNC | Performed by: HOSPITALIST

## 2024-08-15 PROCEDURE — 86580 TB INTRADERMAL TEST: CPT | Mod: HCNC | Performed by: HOSPITALIST

## 2024-08-15 RX ORDER — SODIUM CHLORIDE 9 MG/ML
INJECTION, SOLUTION INTRAVENOUS CONTINUOUS
Status: ACTIVE | OUTPATIENT
Start: 2024-08-15 | End: 2024-08-16

## 2024-08-15 RX ORDER — INSULIN GLARGINE 100 [IU]/ML
14 INJECTION, SOLUTION SUBCUTANEOUS DAILY
Status: DISCONTINUED | OUTPATIENT
Start: 2024-08-15 | End: 2024-08-16

## 2024-08-15 RX ORDER — HYDROCODONE BITARTRATE AND ACETAMINOPHEN 500; 5 MG/1; MG/1
TABLET ORAL
Status: DISCONTINUED | OUTPATIENT
Start: 2024-08-15 | End: 2024-08-21 | Stop reason: HOSPADM

## 2024-08-15 RX ORDER — INSULIN ASPART 100 [IU]/ML
10 INJECTION, SOLUTION INTRAVENOUS; SUBCUTANEOUS
Status: DISCONTINUED | OUTPATIENT
Start: 2024-08-15 | End: 2024-08-16

## 2024-08-15 RX ORDER — LORAZEPAM 0.5 MG/1
0.5 TABLET ORAL ONCE
Status: COMPLETED | OUTPATIENT
Start: 2024-08-15 | End: 2024-08-15

## 2024-08-15 RX ADMIN — Medication 6 MG: at 09:08

## 2024-08-15 RX ADMIN — ACETAMINOPHEN 1000 MG: 325 TABLET ORAL at 01:08

## 2024-08-15 RX ADMIN — INSULIN ASPART 2 UNITS: 100 INJECTION, SOLUTION INTRAVENOUS; SUBCUTANEOUS at 08:08

## 2024-08-15 RX ADMIN — HEPARIN SODIUM 5000 UNITS: 5000 INJECTION INTRAVENOUS; SUBCUTANEOUS at 05:08

## 2024-08-15 RX ADMIN — METOPROLOL SUCCINATE 25 MG: 25 TABLET, EXTENDED RELEASE ORAL at 08:08

## 2024-08-15 RX ADMIN — SODIUM BICARBONATE 650 MG TABLET 650 MG: at 08:08

## 2024-08-15 RX ADMIN — HEPARIN SODIUM 5000 UNITS: 5000 INJECTION INTRAVENOUS; SUBCUTANEOUS at 01:08

## 2024-08-15 RX ADMIN — METHOCARBAMOL 500 MG: 500 TABLET ORAL at 11:08

## 2024-08-15 RX ADMIN — INSULIN GLARGINE 14 UNITS: 100 INJECTION, SOLUTION SUBCUTANEOUS at 08:08

## 2024-08-15 RX ADMIN — SODIUM BICARBONATE 650 MG TABLET 650 MG: at 09:08

## 2024-08-15 RX ADMIN — METHOCARBAMOL 500 MG: 500 TABLET ORAL at 08:08

## 2024-08-15 RX ADMIN — TUBERCULIN PURIFIED PROTEIN DERIVATIVE 5 UNITS: 5 INJECTION, SOLUTION INTRADERMAL at 03:08

## 2024-08-15 RX ADMIN — INSULIN ASPART 10 UNITS: 100 INJECTION, SOLUTION INTRAVENOUS; SUBCUTANEOUS at 11:08

## 2024-08-15 RX ADMIN — CEFAZOLIN 2 G: 2 INJECTION, POWDER, FOR SOLUTION INTRAMUSCULAR; INTRAVENOUS at 04:08

## 2024-08-15 RX ADMIN — CEFAZOLIN 2 G: 2 INJECTION, POWDER, FOR SOLUTION INTRAMUSCULAR; INTRAVENOUS at 05:08

## 2024-08-15 RX ADMIN — HEPARIN SODIUM 5000 UNITS: 5000 INJECTION INTRAVENOUS; SUBCUTANEOUS at 09:08

## 2024-08-15 RX ADMIN — ACETAMINOPHEN 1000 MG: 325 TABLET ORAL at 05:08

## 2024-08-15 RX ADMIN — LORAZEPAM 0.5 MG: 0.5 TABLET ORAL at 11:08

## 2024-08-15 RX ADMIN — INSULIN ASPART 10 UNITS: 100 INJECTION, SOLUTION INTRAVENOUS; SUBCUTANEOUS at 08:08

## 2024-08-15 RX ADMIN — INSULIN ASPART 10 UNITS: 100 INJECTION, SOLUTION INTRAVENOUS; SUBCUTANEOUS at 05:08

## 2024-08-15 RX ADMIN — ISOSORBIDE MONONITRATE 30 MG: 30 TABLET, EXTENDED RELEASE ORAL at 08:08

## 2024-08-15 RX ADMIN — METHOCARBAMOL 500 MG: 500 TABLET ORAL at 05:08

## 2024-08-15 RX ADMIN — SODIUM CHLORIDE: 9 INJECTION, SOLUTION INTRAVENOUS at 09:08

## 2024-08-15 RX ADMIN — METHOCARBAMOL 500 MG: 500 TABLET ORAL at 09:08

## 2024-08-15 RX ADMIN — FLUOXETINE HYDROCHLORIDE 40 MG: 20 CAPSULE ORAL at 08:08

## 2024-08-15 RX ADMIN — ACETAMINOPHEN 1000 MG: 325 TABLET ORAL at 09:08

## 2024-08-15 RX ADMIN — INSULIN ASPART 3 UNITS: 100 INJECTION, SOLUTION INTRAVENOUS; SUBCUTANEOUS at 05:08

## 2024-08-15 RX ADMIN — PANTOPRAZOLE SODIUM 40 MG: 40 TABLET, DELAYED RELEASE ORAL at 08:08

## 2024-08-15 RX ADMIN — GABAPENTIN 300 MG: 300 CAPSULE ORAL at 08:08

## 2024-08-15 NOTE — PLAN OF CARE
Problem: Adult Inpatient Plan of Care  Goal: Plan of Care Review  8/15/2024 0610 by Adriana Drake RN  Outcome: Progressing  8/15/2024 0610 by Adriana Drake RN  Outcome: Progressing     Problem: Adult Inpatient Plan of Care  Goal: Patient-Specific Goal (Individualized)  8/15/2024 0610 by Adriana Drake RN  Outcome: Progressing  8/15/2024 0610 by Adriana Drake RN  Outcome: Progressing     Problem: Adult Inpatient Plan of Care  Goal: Absence of Hospital-Acquired Illness or Injury  8/15/2024 0610 by Adriana Drake RN  Outcome: Progressing  8/15/2024 0610 by Adriana Drake RN  Outcome: Progressing     Problem: Adult Inpatient Plan of Care  Goal: Optimal Comfort and Wellbeing  8/15/2024 0610 by Adriana Drake RN  Outcome: Progressing

## 2024-08-15 NOTE — ASSESSMENT & PLAN NOTE
- hx noted  - continue home fluoxetine   High anxiety at baseline per her and daugher, doctors have been hesitant to do acute anxiety meds due to fall risk they said, was not on on admit, high anxiety 8/15, will try ativan x 1 as suspect having possible panic attack after therapy sesion based on nusring description

## 2024-08-15 NOTE — PLAN OF CARE
08/15/24 1156   Post-Acute Status   Post-Acute Authorization Placement   Post-Acute Placement Status Referrals Sent   Discharge Plan   Discharge Plan A Skilled Nursing Facility     SW faxed referrals to [facility], Uday NIELSON, Rober Macias, and Annabelle Jhaveri via [facility] for review. SW will follow up as needed.    2:28 PM  SW completed LOCET and faxed PASRR to state. Waiting on 142.    Faith Nunes LMSW  Case Management   [facility]   Ext. [phone]

## 2024-08-15 NOTE — PROGRESS NOTES
[facility]  Orthopedics  Progress Note    Patient Name: Lorena Contreras  MRN: [id number]  Admission Date: 8/13/2024  Hospital Length of Stay: 1 days  Attending Provider: Rupal Ochoa MD  Primary Care Provider: Jorge Paris MD  Follow-up For: Procedure(s) (LRB):  ORIF, FRACTURE, PILON (Right)    Post-Operative Day: 1 Day Post-Op  Subjective:     Principal Problem:Closed right pilon fracture    Principal Orthopedic Problem: same + R both column acetabular fracture    Interval History: Pt seen and examined at bedside. NAEON, VSS, AF. Pain controlled. Denies fevers, chills, chest pain, SOB, N/V/D.   Plan to work with PT today.     Review of patient's allergies indicates:   Allergen Reactions    Novolin 70/30 (semi-synthetic) Nausea And Vomiting     Severe vomiting on Relion 70/30    Sulfa (sulfonamide antibiotics) Anaphylaxis    Talwin [pentazocine lactate] Anaphylaxis    Victoza [liraglutide] Nausea And Vomiting    Glipizide Nausea Only    Citric acid     Codeine     Influenza virus vaccines Hives    Iodine and iodide containing products Hives    Levetiracetam Itching    Rituxan [rituximab] Hives    Zoloft [sertraline] Nausea And Vomiting       Current Facility-Administered Medications   Medication    0.9%  NaCl infusion    acetaminophen tablet 1,000 mg    albuterol-ipratropium 2.5 mg-0.5 mg/3 mL nebulizer solution 3 mL    bisacodyL suppository 10 mg    ceFAZolin 2 g in D5W 50 mL IVPB (MB+)    dextrose 10% bolus 125 mL 125 mL    dextrose 10% bolus 250 mL 250 mL    diphenhydrAMINE capsule 25 mg    FLUoxetine capsule 40 mg    glucagon (human recombinant) injection 1 mg    glucose chewable tablet 16 g    glucose chewable tablet 24 g    heparin (porcine) injection 5,000 Units    insulin aspart U-100 pen 0-5 Units    insulin aspart U-100 pen 10 Units    insulin glargine U-100 (Lantus) pen 14 Units    isosorbide mononitrate 24 hr tablet 30 mg    melatonin tablet 6 mg    methocarbamoL tablet 500 mg     "metoprolol succinate (TOPROL-XL) 24 hr tablet 25 mg    morphine injection 2 mg    ondansetron disintegrating tablet 8 mg    oxyCODONE immediate release tablet 5 mg    oxyCODONE immediate release tablet Tab 10 mg    pantoprazole EC tablet 40 mg    polyethylene glycol packet 17 g    promethazine tablet 25 mg    sodium bicarbonate tablet 650 mg    sodium chloride 0.9% flush 10 mL     Objective:     Vital Signs (Most Recent):  Temp: 98.1 °F (36.7 °C) (08/15/24 0753)  Pulse: 88 (08/15/24 0753)  Resp: 20 (08/15/24 0753)  BP: (!) 146/60 (08/15/24 0753)  SpO2: (!) 93 % (08/15/24 0753) Vital Signs (24h Range):  Temp:  [97.7 °F (36.5 °C)-98.7 °F (37.1 °C)] 98.1 °F (36.7 °C)  Pulse:  [75-88] 88  Resp:  [14-23] 20  SpO2:  [92 %-100 %] 93 %  BP: ()/(48-70) 146/60     Weight: 81.6 kg (179 lb 14.3 oz)  Height: 5' 9" (175.3 cm)  Body mass index is 26.57 kg/m².      Intake/Output Summary (Last 24 hours) at 8/15/2024 0944  Last data filed at 8/15/2024 0609  Gross per 24 hour   Intake 1100 ml   Output 1030 ml   Net 70 ml        Ortho/SPM Exam     Gen: NAD, WDWN  CV: peripherally well perfused  Resp: unlabored respirations, symmetric chest rise  Neck: TM  Neuro: CN 2-12 grossly intact. No FND.    MSK:  RLE:  SILT  Short leg splint in place, c/d/I  AROM of toes intact  WWP  Pain with ROM of hip    Significant Labs: CBC:   Recent Labs   Lab 08/13/24  1650 08/14/24  0220 08/15/24  0457   WBC 10.49 11.26 8.20   HGB 10.9* 9.9* 8.5*   HCT 33.2* 30.9* 27.5*    175 136*     CMP:   Recent Labs   Lab 08/13/24  1650 08/14/24  0220 08/15/24  0457   * 135* 138   K 4.7 4.5 4.9    106 105   CO2 19* 17* 21*   * 279* 276*   BUN 29* 32* 36*   CREATININE 1.9* 1.9* 2.2*   CALCIUM 8.7 8.6* 8.0*   PROT 6.1 6.1 5.7*   ALBUMIN 2.6* 2.4* 2.2*   BILITOT 0.5 0.5 0.3   ALKPHOS 205* 188* 154*   AST 50* 44* 29   ALT 45* 39 20   ANIONGAP 9 12 12     All pertinent labs within the past 24 hours have been reviewed.    Significant " Imaging: I have reviewed all pertinent imaging results/findings.  Assessment/Plan:     * Closed right pilon fracture  Lorena Contreras is a 73 y.o. female with R pilon fracture and R both column acetabular fracture, closed, NVI,    S/p ORIF R pilon 8/14/24    NWB RLE  Ancef x 24 hours  Heparin 5K TID for DVT PPx  Remove splint, change dressing, and place a well-padded short-leg fiberglass cast prior to hospital discharge.          Closed displaced fracture of right acetabulum  - Plan to attempt physical therapy today  - If has difficulty mobilizing, we will plan for operative fixation of her right acetabulum during this admission, possibly Friday          Davian Reeves MD  Orthopedics  [facility]

## 2024-08-15 NOTE — ASSESSMENT & PLAN NOTE
Anemia is likely due to acute blood loss which was from surgery . Most recent hemoglobin and hematocrit are listed below.  Recent Labs     08/13/24  1650 08/14/24  0220 08/15/24  0457   HGB 10.9* 9.9* 8.5*   HCT 33.2* 30.9* 27.5*     Plan  - Monitor serial CBC: Daily  - Transfuse PRBC if patient becomes hemodynamically unstable, symptomatic or H/H drops below 7/21.  - Patient has not received any PRBC transfusions to date  - Patient's anemia is currently  downtrend post op

## 2024-08-15 NOTE — ASSESSMENT & PLAN NOTE
Closed fracture of right iliac wing  Fracture of acetabulum  - AF, VSS  - Xray Hip showed fractures of the right inferior and superior pubic rami   - CT Pelvis showed acute traumatic fractures involving the right iliac wing, the anterior pillar of the right acetabulum and the lateral most aspect of the right superior ramus, and the right inferior pubic ramus   - Ortho consulted- and possible OR o 8/16 pending PT/OT evals today as would be a larger surgery requiring plates, etc so if was able to tolerate PT/OT would consider non op management if possible so will f/u ortho resc further today, NWB to RLE either way for ankle for 8 weeks minimum  - multimodal pain regimen  - fall precautions  - on heparin for now given short half life, pending decisions

## 2024-08-15 NOTE — NURSING
Nurses Note -- 4 Eyes      8/15/2024   3:40 AM      Skin assessed during: Q Shift Change      [x] No Altered Skin Integrity Present    []Prevention Measures Documented      [] Yes- Altered Skin Integrity Present or Discovered   [] LDA Added if Not in Epic (Describe Wound)   [] New Altered Skin Integrity was Present on Admit and Documented in LDA   [] Wound Image Taken    Wound Care Consulted? No    Attending Nurse: FRANCIS Winn    Second RN/Staff Member: FRANCIS Torres

## 2024-08-15 NOTE — ASSESSMENT & PLAN NOTE
Lorena Contreras is a 73 y.o. female with R pilon fracture and R both column acetabular fracture, closed, NVI,    S/p ORIF R pilon 8/14/24    NWB RLE  Ancef x 24 hours  Heparin 5K TID for DVT PPx  Remove splint, change dressing, and place a well-padded short-leg fiberglass cast prior to hospital discharge.

## 2024-08-15 NOTE — MEDICAL/APP STUDENT
[facility] - [facility]  Progress Note    Patient Name: Lorena Contreras  MRN: [id number]  Patient Class: IP- Inpatient   Admission Date: 8/13/2024  Length of Stay: 1 days  Attending Physician: Rupal Ochoa MD  Primary Care Provider: Jorge Paris MD    Subjective:     CC: R Ankle, Pelvic Ring fractures     HPI:   Ms. Lorena Contreras is a very pleasant 73yoF with PMHx T2DM with chronic bilateral neuropathy, fibromyalgia, HTN, HLD, Chronic diastolic HF, MI, CVA, PVD, CAD, CKD, GERD, and remote history of follicular lymphoma (2009). She presents as a transfer from [facility] for orthopedic evaluation of R ankle and pelvis after fall from standing height in the grocery store in the evening 8/13.  Describes twisting her ankle, being unable to catch herself, and falling to her right. Denies head strike; denies LOC. Felt immediate pain in her right ankle and hip and was unable to ambulate. Pain was worst in her ankle, and aggravated by movement. Denies prior falls. She walks unassited at baseline. Lives with her grandson, but completes most ADLs independently.     ED Course:  Labs significant for Hgb 10.9, Hct 33.2, eGFR 27.5, Cr 1.9 (CKD4, baseline Cr ~1.2-1.5, 1.8 in July), Gluc 348, AST 50, ALT 45, , Troponin 0.013. UA positive for protein, glucose, blood, leukocytes (1+), WBCs, and scant bacteria. CXR demonstrating mild edema vs. Chronic changes (no significant change from prior imaging). R ankle XR with fractures of the distal tibia and fibula. R Hip XR with fractures of the inferior and superior pubic rami as well as suspicion for fracture of the illiac wing. Better demonstrated on CT pelvis, which showed fractures of the R iliac wing, anterior pillar of the R acetabulum, lateral superior ramus, and R inferior pubic ramus. Ortho evaluated in [facility] ED and plan for surgery 8/14.      Hospital Course :  R Ankle ORIF by Dr. Davalos 8/14. Plan for surgical fixation of the pelvis 8/16. Interim DVT  "Prophylaxis with heparin. UA equivocal for UTI; culture pending.     Interval History:   Ms. Contreras is in great spirits this morning, resting comfortably in bed and chatting about her crafting hobby. Anxiety evident about rehab and pain, as well as her many medication allergies. Daughter at bedside. POD1 after R ankle ORIF 8/14. She denies any ankle pain, but reports deep, muscular pain in the pelvis with any movement. States methocarbamol was very helpful last night, and is scheduled. She is very hesitant to work with PT/OT, but has agreed to push herself and do her best. NWB on RLE. Will follow-up with ortho recommendations regarding pelvis. Tremulousness noted this morning on exam; patient states she first noticed the shaking a few months ago as it was affecting her ability to eat/drink and do crafts. She "thought she might have Parkinsons", but her PCP felt it was unlikely after some writing tests. Waxes and wanes with no evident trigger. Decrease during purposeful movement. Possibly related to gabapentin which she has had previously, ceased due to side effects, and most recently got a single dose in the ED. Will hold while IP to see if tremors affected.   Ms. Contreras has known CKD4, with typical baseline Cr 1.9 and eGFR 27.5. This morning Cr 2.2 and eGFR 23.1; she was NPO yesterday awaiting surgery and this dip may be reflective of the dehydration. Will give gentle fluids and encourage PO hydration. Monitor with daily CMP. Hgb also decreased this morning to 8.5, likely 2/2 to recent surgery. Will monitor and may consider transfusion to optimize for surgery 8/16, depending on surgical plan. Mag repleted and increased to 2.2 from 1.5 yesterday. Supplementing chronically low bicarb in setting of CKD4; increased from 17 to 21 today.     ** 11:53AM: Now reporting "My leg came off" and seeing roses on the walls and a bubble floating across the room. Patient has quite a lot of generalized anxiety at baseline " and reports increased anxiety regarding her injury, decreased independence, interrupted plans at home, and daughters. Given home Ativan 0.5 for anxiety, which has seemed to help. Tremor also decreased as anxiety decreased.     Review of Systems   Constitutional:  Negative for chills and fever. Weight loss: significant planned weight loss over last few years.  HENT: Negative.     Eyes: Negative.    Respiratory:  Negative for cough, sputum production and shortness of breath.    Cardiovascular:  Negative for chest pain and palpitations.   Gastrointestinal:  Negative for diarrhea, nausea and vomiting.   Genitourinary:  Negative for dysuria.   Musculoskeletal:  Positive for back pain and joint pain.   Skin:  Negative for itching.   Neurological:  Positive for tremors (BUE), sensory change (chronic BLE peripheral neuropathy) and weakness. Negative for dizziness, tingling, speech change, seizures, loss of consciousness and headaches.   Psychiatric/Behavioral: Negative.       Objective:     Vitals:    08/15/24 0753   BP: (!) 146/60   Pulse: 88   Resp: 20   Temp: 98.1 °F (36.7 °C)     Physical Exam  Constitutional:       General: She is not in acute distress.     Appearance: Normal appearance. She is not toxic-appearing.   Cardiovascular:      Rate and Rhythm: Normal rate and regular rhythm.      Pulses: Normal pulses.      Heart sounds: Normal heart sounds.   Pulmonary:      Effort: Pulmonary effort is normal. No respiratory distress.      Breath sounds: Normal breath sounds.   Abdominal:      General: Bowel sounds are normal. There is no distension.      Tenderness: There is no abdominal tenderness.   Musculoskeletal:         General: Tenderness and signs of injury (s/p R ankle ORIF. Pelvic reing fx) present.   Skin:     Capillary Refill: Capillary refill takes less than 2 seconds.   Neurological:      Mental Status: She is alert and oriented to person, place, and time.      Gait: Gait abnormal (NWB RLE).      Deep Tendon  Reflexes: Reflexes abnormal (hyporeflexic in UEs).      Comments: BUE tremor, worst at rest. No past-pointing, intention tremor, or significant ataxia on FTN testing.    Psychiatric:         Mood and Affect: Mood normal.       Recent Labs   Lab 08/15/24  0457   CALCIUM 8.0*   ALBUMIN 2.2*   PROT 5.7*      K 4.9   CO2 21*      BUN 36*   CREATININE 2.2*   ALKPHOS 154*   ALT 20   AST 29   BILITOT 0.3     eGFR 23.1  Glucose 276  Mg 2.2    Recent Labs   Lab 08/15/24  0457   WBC 8.20   RBC 3.12*   HGB 8.5*   HCT 27.5*   *   MCV 88   MCH 27.2   MCHC 30.9*     Assessment/Plan:      Ms. Lorena Contreras is a very pleasant 73yoF who presents after mechanical fall from standing height, with resulting R ankle trimalleolar fracture and R pelvic ring fracture. S/p R ankle ORIF 8/14, with pelvic fixation planned for 8/16.      **R Ankle Medial Malleolus & Distal Fibula Fracture  [CT: There is acute appearing fracture involving the distal aspect of the fibula. There is comminuted fracture of the medial malleolus.]  S/p ORIF with post-op splint. Plan to change to short-leg fiberglass cast prior to d/c with consideration for CAM walker at 3wk post-op.   - Restart diet post op (Diabetic + Renal diet).   - NWB RLE x 10wk  - Pain management with multimodals and narcotics PRN for breakthrough pain  - Benoit in place, pending sx Friday  - PT/OT referral for evaluation post-op, pending restrictions per orthopedics.  Recommendations appreciated.               > Ortho restrictions: NWB RLE  - Patient is not anticoagulated on baseline; She is on antiplatelets, and may require TXA intraoperatively.   - DVT Prophylaxis:   > Plan to return to theater for surgical fixation of the pelvis, which is expected to be open with risk of blood loss. Will plan for heparin as prophylaxis in the interim. After second surgery, will start eliwuis x 10 weeks while patient is NWB.   > 8/14-8/16: Heparin 5000 subcut TID  > HOLD Heparin 6 hours  prior to surgery 8/16  > Post op 8/16: eliquis x 10 weeks post op     ** R Pelvic Ring Fracture  [CT: Acute traumatic fractures involving the right iliac wing, the anterior pillar of the right acetabulum and the lateral most aspect of the right superior ramus, and the right inferior pubic ramus.]  Plan for surgical fixation 8/16 per Ortho.   - f/u ortho recs  - pain management with multimodals and narcotics PRN for breakthrough pain  - NPO at midnight Thursday per current plan  - hold heparin 6 hours prior to surgery.   - eliquis x 10 weeks post op for DVT prophylaxis (patient will be BWB RLE for 10 weeks per ankle protocols).     Generalized Anxiety Disorder.   Discussed multiple ideas of decreased anxiety and stress relief. Will try to coordinate with daughters to find a way of bringing some crafts in from home for her to work on, as this help distract her and keep her calm. Emphasized importance of taking things one day at a time and prioritizing safety.   - Ativan 0.5mg PO PRN (home dose)      Abnormal UA  UA equivocal for UTI vs fallout from CKD. Culture pending. Will monitor symptoms and wait for results before initiating antibiotic treatment.   - f/u urine cultures     Hypomagnesemia - resolved  Mg 1.5 on admit. Repleted overnight and 2.2 today (WNL).      Anemia  Hgb 8.5, Hct 27.5. On review of chart baseline appears to be 11-12 Hgb. Likely associated with recent trauma and subsequent surgery. Currently asymptomatic. Taking vitamin supplementation at home including iron, folic acid and B12. MCV WNL (85). Will monitor closely with daily CBC.   - daily CBC  - Continue home vitamin supplementation  - transfusion if H/H drops below 7/21, or patient becomes hemodynamically unstable, or symptomatic.      T2DM  Most Recent A1C 8.2 (July '24). Home regimen 12 units lantus daily and 20 units novolog tid with meals.   - hold home antihyperglycemics while in hospital  - 10 novolog tid with meals  - lantus 14 units  daily  - hold ozempic while awaiting surgery due to risk of gastric retention.      Peripheral Neuropathy  Chronic. 2/2 T2DM. No current complaints. Patient states she is not taking gabapentin for this at home; will be holding gabapentin in case it is associated with tremors.        CKD-4  eGFR 23.1, down from baseline 27.5 (as of July). Cr bumped up to 2.2, up from July baseline of 1.9. Labs on admit were consistant with July frantz. Likely acute mild KYA on chronic CKD4, 2/2 NPO status and surgery.  - gentle IV fluids  - encourage PO rehydration today.     Bicarb 21, up from 17 on admit. Chronically low per chart review. Likely 2/2 decreased kidney function. Not on replacement. Data shows that low bicarb can worsen kidney function and hasten progression of CKD. Started on supplementation inpatient, to be continued long term.   - Daily CMP  - sodium bicarbonate 650mg PO BID, to be continued long term. May adjust dose as needed.      HTN  Most recent blood pressures reviewed. She has been hypertensive since admission.   - continue home hydralazine  - continue home metoprolol 25mg     HLD  - continue home atorvastatin    [facility] - [facility]

## 2024-08-15 NOTE — NURSING
Pt have an order for telemetry monitoring No telemetry monitors available at this time. Telemetry notified. Charge nurse made aware. Will continue with plan of care.

## 2024-08-15 NOTE — ASSESSMENT & PLAN NOTE
Patient with known CAD which is controlled Will continue Statin and monitor for S/Sx of angina/ACS. Continue to monitor on telemetry.   Had stents placed in 2018 to rca and 2020 to 2 areas per dr cervantes note from [facility], had stress in 2022 that was negative for ischemia. She said he wanted to recheck another one recently but insurance did not cover. She said had aspirin allergy testing before it was restarted due to prev intolerance after a stent. Resume asa pending op plans.

## 2024-08-15 NOTE — SUBJECTIVE & OBJECTIVE
Principal Problem:Closed right pilon fracture    Principal Orthopedic Problem: same + R both column acetabular fracture    Interval History: Pt seen and examined at bedside. NAEON, VSS, AF. Pain controlled. Denies fevers, chills, chest pain, SOB, N/V/D.   Plan to work with PT today.     Review of patient's allergies indicates:   Allergen Reactions    Novolin 70/30 (semi-synthetic) Nausea And Vomiting     Severe vomiting on Relion 70/30    Sulfa (sulfonamide antibiotics) Anaphylaxis    Talwin [pentazocine lactate] Anaphylaxis    Victoza [liraglutide] Nausea And Vomiting    Glipizide Nausea Only    Citric acid     Codeine     Influenza virus vaccines Hives    Iodine and iodide containing products Hives    Levetiracetam Itching    Rituxan [rituximab] Hives    Zoloft [sertraline] Nausea And Vomiting       Current Facility-Administered Medications   Medication    0.9%  NaCl infusion    acetaminophen tablet 1,000 mg    albuterol-ipratropium 2.5 mg-0.5 mg/3 mL nebulizer solution 3 mL    bisacodyL suppository 10 mg    ceFAZolin 2 g in D5W 50 mL IVPB (MB+)    dextrose 10% bolus 125 mL 125 mL    dextrose 10% bolus 250 mL 250 mL    diphenhydrAMINE capsule 25 mg    FLUoxetine capsule 40 mg    glucagon (human recombinant) injection 1 mg    glucose chewable tablet 16 g    glucose chewable tablet 24 g    heparin (porcine) injection 5,000 Units    insulin aspart U-100 pen 0-5 Units    insulin aspart U-100 pen 10 Units    insulin glargine U-100 (Lantus) pen 14 Units    isosorbide mononitrate 24 hr tablet 30 mg    melatonin tablet 6 mg    methocarbamoL tablet 500 mg    metoprolol succinate (TOPROL-XL) 24 hr tablet 25 mg    morphine injection 2 mg    ondansetron disintegrating tablet 8 mg    oxyCODONE immediate release tablet 5 mg    oxyCODONE immediate release tablet Tab 10 mg    pantoprazole EC tablet 40 mg    polyethylene glycol packet 17 g    promethazine tablet 25 mg    sodium bicarbonate tablet 650 mg    sodium chloride 0.9%  "flush 10 mL     Objective:     Vital Signs (Most Recent):  Temp: 98.1 °F (36.7 °C) (08/15/24 0753)  Pulse: 88 (08/15/24 0753)  Resp: 20 (08/15/24 0753)  BP: (!) 146/60 (08/15/24 0753)  SpO2: (!) 93 % (08/15/24 0753) Vital Signs (24h Range):  Temp:  [97.7 °F (36.5 °C)-98.7 °F (37.1 °C)] 98.1 °F (36.7 °C)  Pulse:  [75-88] 88  Resp:  [14-23] 20  SpO2:  [92 %-100 %] 93 %  BP: ()/(48-70) 146/60     Weight: 81.6 kg (179 lb 14.3 oz)  Height: 5' 9" (175.3 cm)  Body mass index is 26.57 kg/m².      Intake/Output Summary (Last 24 hours) at 8/15/2024 0944  Last data filed at 8/15/2024 0609  Gross per 24 hour   Intake 1100 ml   Output 1030 ml   Net 70 ml        Ortho/SPM Exam     Gen: NAD, WDWN  CV: peripherally well perfused  Resp: unlabored respirations, symmetric chest rise  Neck: TM  Neuro: CN 2-12 grossly intact. No FND.    MSK:  RLE:  SILT  Short leg splint in place, c/d/I  AROM of toes intact  WWP  Pain with ROM of hip    Significant Labs: CBC:   Recent Labs   Lab 08/13/24  1650 08/14/24  0220 08/15/24  0457   WBC 10.49 11.26 8.20   HGB 10.9* 9.9* 8.5*   HCT 33.2* 30.9* 27.5*    175 136*     CMP:   Recent Labs   Lab 08/13/24  1650 08/14/24  0220 08/15/24  0457   * 135* 138   K 4.7 4.5 4.9    106 105   CO2 19* 17* 21*   * 279* 276*   BUN 29* 32* 36*   CREATININE 1.9* 1.9* 2.2*   CALCIUM 8.7 8.6* 8.0*   PROT 6.1 6.1 5.7*   ALBUMIN 2.6* 2.4* 2.2*   BILITOT 0.5 0.5 0.3   ALKPHOS 205* 188* 154*   AST 50* 44* 29   ALT 45* 39 20   ANIONGAP 9 12 12     All pertinent labs within the past 24 hours have been reviewed.    Significant Imaging: I have reviewed all pertinent imaging results/findings.  "

## 2024-08-15 NOTE — PT/OT/SLP EVAL
"Occupational Therapy   Co-Evaluation    Name: Lorena Contreras  MRN: [id number]  Admitting Diagnosis: Closed right pilon fracture  Recent Surgery: Procedure(s) (LRB):  ORIF, FRACTURE, PILON (Right) 1 Day Post-Op    Recommendations:     Discharge Recommendations: High Intensity Therapy  Discharge Equipment Recommendations:  wheelchair  Barriers to discharge:  Decreased caregiver support    Assessment:     Lorena Contreras is a 73 y.o. female with a medical diagnosis of Closed right pilon fracture.  Performance deficits affecting function: weakness, impaired endurance, impaired sensation, impaired self care skills, impaired functional mobility, gait instability, impaired balance, impaired cognition, decreased lower extremity function, decreased safety awareness, pain, orthopedic precautions, impaired coordination, impaired fine motor.  Pt agreeable to therapy and tolerated well. Pt noted with slight cognitive impairments with poor attention, increased anxiety, and tangential requiring frequent redirection. Pt with B U/LE tremors sitting EOB. Pt with hallucinations during session, stating she saw the walls moving and stated "The legs are falling off. Don't sit on that couch". OT/PT unable to decipher statement meaning. Increased time required for emotional support 2/2 increased anxiety. Pt remains limited in ADLs, functional mobility, and functional transfers. Patient presents with good participation and motivation to return to prior level of function with high intensity therapy.  The patient demonstrates appropriate endurance, strength, pain control, and fine motor control to participate in up to 3 hours or 15 hrs of combined therapy post acute.      Co-treat performed due to acuity and complexity of pt's medical status with 2 skilled disciplines needed to optimize pts occupational performance and  decreased activity tolerance.     Rehab Prognosis: Good; patient would benefit from acute skilled OT services to " "address these deficits and reach maximum level of function.       Plan:     Patient to be seen 4 x/week to address the above listed problems via self-care/home management, therapeutic activities, therapeutic exercises, neuromuscular re-education  Plan of Care Expires: 09/15/24  Plan of Care Reviewed with: patient, daughter    Subjective     Chief Complaint: pain  Patient/Family Comments/goals: "The leg is gone."    Occupational Profile:  Living Environment: Pt lives alone in [facility], 4-5STE with B HR, t/s with bath bench. Pt's grandson lives in duplex next door  Previous level of function: (I) ADLs and mobility. Pt reports not driving  Roles and Routines: craft work  Equipment Used at Home: hospital bed, walker, rolling, rollator, bath bench, cane, quad, cane, straight, bedside commode. Pt uses hospital bed and BSC currently  Assistance upon Discharge: no 24/7 assistance and pt daughter states there is no room for her to move in with relatives    Pain/Comfort:  Pain Rating 1:  (not rated)  Location - Side 1: Right  Location - Orientation 1: generalized  Location 1: hip  Pain Addressed 1: Reposition, Distraction, Nurse notified  Pain Rating Post-Intervention 1:  (not rated)    Patients cultural, spiritual, [employer] conflicts given the current situation: no    Objective:     Communicated with: Nsg prior to session.  Patient found HOB elevated with chong catheter, SCD, IV upon OT entry to room.    General Precautions: Standard, fall  Orthopedic Precautions: RLE non weight bearing  Braces:    Respiratory Status: Room air    Occupational Performance:    Bed Mobility:    Patient completed Scooting/Bridging with maximal assistance and 2 persons  Patient completed Supine to Sit with moderate assistance and 2 persons. HOB elevated  Patient completed Sit to Supine with moderate assistance and 2 persons. Bed flat    Functional Mobility/Transfers:  Patient completed Sit <> Stand Transfer with maximal assistance and of 2 persons  " with  hand-held assist and rolling walker . Pt with 20% clearance from EOB, resistant towards assistance 2/2 pain and anxiety, and unable to come to full stand    Activities of Daily Living:  Grooming: CGA <-> Mod A assistance for balance sitting EOB washing face with wash cloth. Pt with instances of CGA but required Mod A to balance and perform grooming,    Cognitive/Visual Perceptual:  Cognitive/Psychosocial Skills:     -       Oriented to: Person, Place, Time, and Situation   -       Follows Commands/attention:Easily distracted and Follows one-step commands  -       Communication: clear/fluent  -       Memory: impaired per  daughter   -       Safety awareness/insight to disability: impaired   -       Mood/Affect/Coping skills/emotional control: Anxious    Physical Exam:  Sensation:    -       Intact  Dominant hand:    -       Right  Upper Extremity Range of Motion:     -       Right Upper Extremity: WNL  -       Left Upper Extremity: WNL  Upper Extremity Strength:    -       Right Upper Extremity: 3+/5  -       Left Upper Extremity: 3+/5   Strength:    -       Right Upper Extremity: WFL  -       Left Upper Extremity: WFL  Fine Motor Coordination:    -       Impaired  BUE tremors     AMPAC 6 Click ADL:  AMPAC Total Score: 15    Treatment & Education:  -Education on energy conservation and task modification to maximize safety and (I) during ADLs and mobility  -Education on importance of OOB activity to improve overall activity tolerance and promote recovery  -Pt educated to call for assistance and to transfer with hospital staff only  -Education on RLE NWB precautions. Pt able to recall precautions without verbal cues  -Provided education regarding role of OT, POC, & discharge recommendations with pt and daughter verbalizing understanding.  Pt had no further questions & when asked whether there were any concerns pt reported none.      Patient left HOB elevated with all lines intact, call button in reach, nsg  notified, and daughter present    GOALS:   Multidisciplinary Problems       Occupational Therapy Goals          Problem: Occupational Therapy    Goal Priority Disciplines Outcome Interventions   Occupational Therapy Goal     OT, PT/OT Progressing    Description: Goals to be met by: 9/15/2024     Patient will increase functional independence with ADLs by performing:    UE Dressing with Supervision.  LE Dressing with Minimal Assistance.  Grooming while standing at sink with Minimal Assistance.  Toileting from bedside commode with Moderate Assistance for hygiene and clothing management.   Step transfer with Moderate Assistance  Toilet transfer to bedside commode with Minimal Assistance.                         History:     Past Medical History:   Diagnosis Date    Allergy     Altered mental status 06/19/2022    DYSARTHRIA, SPASTIC MOVEMENTS & DIFFICULTY SWALLOWING    Anemia     Anxiety     Arthritis     Cataract     both removed    Colon polyps     Coronary artery disease     Depression     Diabetes mellitus, type II     Disorder of kidney and ureter     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/2023    Fibromyalgia     Follicular lymphoma     GERD (gastroesophageal reflux disease)     HTN (hypertension)     Hyperlipidemia     MI (myocardial infarction) 03/2019    Personal history of colonic polyps     Restless leg syndrome     Stroke          Past Surgical History:   Procedure Laterality Date    COLONOSCOPY  11/07/2012    Colon polyp found; repeat in 5 years    ELBOW SURGERY Right 2015    dislocation repair     ESOPHAGOGASTRODUODENOSCOPY  11/07/2012    atrophic gastritis, H pylori testing negative    INCISION AND DRAINAGE FOOT Right 6/2/2021    Procedure: INCISION AND DRAINAGE, FOOT, bone biopsy;  Surgeon: Quiana Penn DPM;  Location: [facility];  Service: Podiatry;  Laterality: Right;    KNEE SURGERY Bilateral 2015    scoped    LEFT HEART CATHETERIZATION Left 3/29/2019    Procedure: Left heart cath;  Surgeon: Bladimir WHITING  MD Chelsey;  Location: [facility] CATH LAB;  Service: Cardiology;  Laterality: Left;    LEFT HEART CATHETERIZATION Left 11/18/2019    Procedure: Left heart cath;  Surgeon: Karl Rico MD;  Location: [facility] CATH LAB;  Service: Cardiology;  Laterality: Left;    LEFT HEART CATHETERIZATION Left 1/8/2020    Procedure: Left heart cath, right radial, noon start;  Surgeon: Christos Monreal MD;  Location: [facility] CATH LAB;  Service: Cardiology;  Laterality: Left;  RN Pre Op 1-6-20.  To be admitted 1-7-20 sor Aspirin Disensitation    OPEN REDUCTION AND INTERNAL FIXATION (ORIF) OF PILON FRACTURE Right 8/14/2024    Procedure: ORIF, FRACTURE, PILON;  Surgeon: Neto Davalos MD;  Location: [address];  Service: Orthopedics;  Laterality: Right;    TONSILLECTOMY  1955    ULTRASOUND GUIDANCE  1/8/2020    Procedure: Ultrasound Guidance;  Surgeon: Christos Monreal MD;  Location: [facility] CATH LAB;  Service: Cardiology;;       Time Tracking:     OT Date of Treatment: 08/15/24  OT Start Time: 1103  OT Stop Time: 1137  OT Total Time (min): 34 min    Billable Minutes:Evaluation 10 min  Self Care/Home Management 9 min  Therapeutic Activity 14 min    8/15/2024

## 2024-08-15 NOTE — ASSESSMENT & PLAN NOTE
- Plan to attempt physical therapy today  - If has difficulty mobilizing, we will plan for operative fixation of her right acetabulum during this admission, possibly Friday

## 2024-08-15 NOTE — PLAN OF CARE
Problem: Occupational Therapy  Goal: Occupational Therapy Goal  Description: Goals to be met by: 9/15/2024     Patient will increase functional independence with ADLs by performing:    UE Dressing with Supervision.  LE Dressing with Minimal Assistance.  Grooming while standing at sink with Minimal Assistance.  Toileting from bedside commode with Moderate Assistance for hygiene and clothing management.   Step transfer with Moderate Assistance  Toilet transfer to bedside commode with Minimal Assistance.    Outcome: Progressing

## 2024-08-15 NOTE — PLAN OF CARE
PT duyal completed- see note for details, goals and POC established.     Problem: Physical Therapy  Goal: Physical Therapy Goal  Description: Goals to be met by: 24     Patient will increase functional independence with mobility by performin. Supine to sit with Stand-by Assistance  2. Sit to stand transfer with Minimal Assistance  3. Bed to chair transfer with Minimal Assistance using Rolling Walker  4. Gait  x 10 feet with Minimal Assistance using Rolling Walker.   5. Wheelchair propulsion x100 feet with Supervision using bilateral upper extremities  6. Stand for 5 minutes with Contact Guard Assistance using Rolling Walker    Patient has a mobility limitation that significantly impairs their ability to participate in one or more mobility related activities of daily living in customary locations in the home. The mobility limitation cannot be sufficiently resolved by the use of a cane or walker. The use of a manual wheelchair will greatly improve the patient's ability to participate in MRADLs. The patient will use the wheelchair on a regular basis at home. They have expressed their willingness to use a manual wheelchair in the home, and have a caregiver who is available and willing to assist with the wheelchair if needed.    Outcome: Progressing   8/15/2024

## 2024-08-15 NOTE — ASSESSMENT & PLAN NOTE
Patient has Abnormal Magnesium: hypomagnesemia. Will continue to monitor electrolytes closely. Will replace the affected electrolytes and repeat labs to be done after interventions completed. The patient's magnesium results have been reviewed and are listed below.  Recent Labs   Lab 08/15/24  0457   MG 2.2

## 2024-08-15 NOTE — PROGRESS NOTES
[facility] - [facility] Medicine  Progress Note    Patient Name: Lorena Contreras  MRN: [id number]  Patient Class: IP- Inpatient   Admission Date: 8/13/2024  Length of Stay: 1 days  Attending Physician: Rupal Ochoa MD  Primary Care Provider: Jorge Paris MD        Subjective:     Principal Problem:Closed right pilon fracture        HPI:  Lorena Contreras is 72 y/o with PMHx of DM2, Fibromyalgia, HTN, HLD, CVA, MI, and CAD presents to ED via EMS as a [place] transfer for a fall. Pt was walking into grocery store when she twisted her ankle and fell despite attempts to catch herself. Denies LOC or head trauma. Patient fell on her right side. Had immediate pain to her ankle and hip. Pt was unable to ambulate due to pain. Pain is worse in her ankle. States it has subsided s/p pain medications. Pain has been exacerbated when moving. Had an episode of vomiting while splinting ankle but otherwise no further episodes. Denies fever, chills, chest pain, SOB, abdominal pain and urinary symptoms. Has numbness/ tingling to bilateral feet which she states is chronic 2/2 neuropathy.    In the ED: Afebrile, VSS. H&H 10.9 & 33.2. Cr 1.9. Glucose 348. LFTs AST 50, ALT 45. . Urine and drug toxicology screening pending. Chest Xray impression was interstitial findings may reflect edema versus chronic change, no large focal consolidation. Right Ankle Xray impression was distal tibial and fibular fractures as described. Right Hip Xray impression was fractures of the right inferior and superior pubic rami. There is cortical irregularity involving the right iliac wing, concerning for additional fracture. Right Knee Xray showed  no acute displaced fracture or dislocation of the knee. CT Pelvis showed acute traumatic fractures involving the right iliac wing, the anterior pillar of the right acetabulum and the lateral most aspect of the right superior ramus, and the right inferior pubic ramus. No hip  dislocation. CT Lumbar impression was There is no lumbar vertebral body fracture. There is grade 1 anterolisthesis of L4 on L5. At L3/L4, there is moderate central canal narrowing secondary to the facet arthrosis and ligamentum flavum hypertrophy without significant foraminal stenosis. At L4/L5, there is no stone significant central canal narrowing, but there is bilateral mild foraminal narrowing. Secondary to ligamentum flavum hypertrophy and facet arthrosis. At L5/S1, there is a broad-based disc bulge without any significant central canal stenosis or foraminal stenosis. Pain medications given in the ED. Ortho consulted. Admitted to .        Overview/Hospital Course:  No notes on file    Interval history- she had ORIF to right ankle with ortho yesterday and NWB to RLE for at least 8 weeks. Plan for possible fixation of pelvis tomorrow pending how PT/OT goes today as would be a larger surgery so initially was definitely plan but now ortho told her this mornign and her daughter they would like to see how therapy goes first and then decide today based on that. Will discuss with later with ortho to  follow up and then will update gaurav as live [place] [facility] and would want to be here early tomorrow if plan to do surgery tomrorow. Hg 8.5 cr higher than baselien at 2.2 with baseline closer to 1.6 to 1.8 and some light IVF today. She does have times where oral itnake can decrease. Her glucose has been hard to control per daughter and had to be adjusted multiple times as outpatient and is in 200s now but wasn't given long acting insulin yesterday so ordered for this AM and adusted short acting as well. Her daughter reports high anxiety at [facility] and the patient endorses as well. She got very anxious in [facility] yseterady and had bad experience with tthe doctor yelling at her there to calm down after she was having reaction to morphine. She said she does crafts at home to try to calm herself at times, doctors have  been reluctant to give her anxiety meds as they say she's a fall risk and then they asid this happened anyways even without it now. She has been having trmors/jerking in UE causing her to spill coffee, water at timse and it  comes and goes. She saw neuro who told her it wasn't parkinsons but didn't delve into other causes she said. She has not been told it could be related to her gabapentin. She is uinsure if she's been taking religiously lately. She had been off of it and then restarted last October after her shingles episode. She doesn't like taking it as causes her to get mean so was unsure if she was still taking all the time. She can't remmember if it started when she started taking it again. She has some signs of myoclonic jerking on exam and is hypperreflexic in UE on exam. Discussed plan with her and daughter at bedside at length. After exam nursing reported tremors worsened and patient endorses seeing thinsg, suspsct seconday to high anxiety state and will try ativan once to see if calming down and relaxing will help as high suspicion is from this after long discussion with her and her daugher earlier this morning.        Review of patient's allergies indicates:   Allergen Reactions    Novolin 70/30 (semi-synthetic) Nausea And Vomiting     Severe vomiting on Relion 70/30    Sulfa (sulfonamide antibiotics) Anaphylaxis    Talwin [pentazocine lactate] Anaphylaxis    Victoza [liraglutide] Nausea And Vomiting    Glipizide Nausea Only    Citric acid     Codeine     Influenza virus vaccines Hives    Iodine and iodide containing products Hives    Levetiracetam Itching    Rituxan [rituximab] Hives    Zoloft [sertraline] Nausea And Vomiting       No current facility-administered medications on file prior to encounter.     Current Outpatient Medications on File Prior to Encounter   Medication Sig    Bacillus coagulans (DIGESTIVE ADVANTAGE IMMUNE ORAL) Take by mouth.    diclofenac sodium (VOLTAREN TOP) Apply topically.     gabapentin (NEURONTIN) 300 MG capsule Take 1 capsule (300 mg total) by mouth 3 (three) times daily.    hydrALAZINE (APRESOLINE) 50 MG tablet Take 1 tablet (50 mg total) by mouth every 8 (eight) hours.    LANTUS SOLOSTAR U-100 INSULIN 100 unit/mL (3 mL) InPn pen Inject 12 Units into the skin every evening.    NOVOLOG FLEXPEN U-100 INSULIN 100 unit/mL (3 mL) InPn pen Inject 20 Units into the skin 3 (three) times daily with meals.    albuterol (PROVENTIL/VENTOLIN HFA) 90 mcg/actuation inhaler Inhale 2 puffs into the lungs every 6 (six) hours as needed for Wheezing or Shortness of Breath.    ASCORBIC ACID, VITAMIN C, ORAL Take by mouth once daily.    aspirin 81 MG Chew Take 1 tablet (81 mg total) by mouth once daily.    atorvastatin (LIPITOR) 80 MG tablet Take 1 tablet by mouth in the evening    cholecalciferol, vitamin D3, (VITAMIN D3) 50 mcg (2,000 unit) Cap Take 2 capsules by mouth 2 (two) times daily.    cyanocobalamin (VITAMIN B-12) 1000 MCG tablet Take 100 mcg by mouth once daily.    DEXCOM G7  Misc Use 1  to track blood glucose, ICD10: E11.65    DEXCOM G7 SENSOR Samina Use 1 sensor every 10 days to track blood glucose, ICD10: E11.65, okay with 90 day supply if possible    EPINEPHrine (EPIPEN) 0.3 mg/0.3 mL AtIn Inject 0.3 mLs (0.3 mg total) into the muscle once. for 1 dose    ESOMEPRAZOLE MAGNESIUM ORAL Take by mouth as needed. (Patient not taking: Reported on 5/28/2024)    FLUoxetine 40 MG capsule Take 1 capsule (40 mg total) by mouth once daily.    isosorbide mononitrate (IMDUR) 30 MG 24 hr tablet Take 1 tablet (30 mg total) by mouth once daily.    LIDOcaine (LIDODERM) 5 % Place 1 patch onto the skin once daily. Remove & Discard patch within 12 hours or as directed by MD.    LORazepam (ATIVAN) 1 MG tablet Take 1 tablet (1 mg total) by mouth 2 (two) times daily as needed for Anxiety.    magnesium oxide (MAG-OX) 400 mg tablet Take 400 mg by mouth once daily.    melatonin 10 mg Cap Take 10 mg by  "mouth nightly.    metoprolol succinate (TOPROL-XL) 25 MG 24 hr tablet Take 1 tablet (25 mg total) by mouth once daily.    multivitamin/iron/folic acid (CENTRUM COMPLETE ORAL) Take by mouth.    OZEMPIC 1 mg/dose (4 mg/3 mL) Inject 1 mg into the skin every 7 days.    pantoprazole (PROTONIX) 40 MG tablet Take 1 tablet (40 mg total) by mouth once daily.    pen needle, diabetic (BD ULTRA-FINE SAGAR PEN NEEDLE) 32 gauge x 5/32" Ndle One pen needle use with insulin pen 4 times a day.  ICD-10: E11.9    semaglutide (OZEMPIC) 0.25 mg or 0.5 mg (2 mg/3 mL) pen injector Inject 0.5 mg once weekly thereafter    ticagrelor (BRILINTA) 90 mg tablet Take 1 tablet (90 mg total) by mouth 2 (two) times daily.    vitamin E 1000 UNIT capsule Take 1,000 Units by mouth once daily.     Family History       Problem Relation (Age of Onset)    Allergy (severe) Daughter    Cancer Mother, Father    Diabetes Sister    Heart disease Mother    Hypertension Sister    Lung cancer Brother    No Known Problems Daughter          Tobacco Use    Smoking status: Never    Smokeless tobacco: Never   Substance and Sexual Activity    Alcohol use: Not Currently    Drug use: Never    Sexual activity: Not Currently     Partners: Male     Review of Systems   Constitutional:  Positive for activity change. Negative for chills and fever.   HENT:  Negative for congestion, hearing loss, rhinorrhea and sore throat.    Eyes:  Negative for visual disturbance.   Respiratory:  Negative for shortness of breath.    Cardiovascular:  Negative for chest pain and leg swelling.   Gastrointestinal:  Negative for abdominal pain, constipation, diarrhea, nausea and vomiting.   Genitourinary:  Negative for dysuria, frequency and urgency.   Musculoskeletal:  Positive for arthralgias and myalgias.   Neurological:  Positive for numbness (chronic).     Objective:     Vital Signs (Most Recent):  Temp: 98.3 °F (36.8 °C) (08/15/24 1138)  Pulse: 81 (08/15/24 1138)  Resp: 17 (08/15/24 1138)  BP: " (!) 112/56 (08/15/24 1138)  SpO2: 95 % (08/15/24 1138) Vital Signs (24h Range):  Temp:  [97.7 °F (36.5 °C)-98.7 °F (37.1 °C)] 98.3 °F (36.8 °C)  Pulse:  [75-88] 81  Resp:  [14-23] 17  SpO2:  [92 %-100 %] 95 %  BP: ()/(48-70) 112/56     Weight: 81.6 kg (179 lb 14.3 oz)  Body mass index is 26.57 kg/m².     Physical Exam  Constitutional:       General: She is not in acute distress.     Appearance: Normal appearance. She is not ill-appearing.      Comments: Anxious. Daughter at bedside.    HENT:      Head: Normocephalic and atraumatic.   Eyes:      Extraocular Movements: Extraocular movements intact.   Cardiovascular:      Rate and Rhythm: Normal rate and regular rhythm.      Heart sounds: Murmur heard.      No friction rub. No gallop.   Pulmonary:      Effort: Pulmonary effort is normal.      Breath sounds: Normal breath sounds. No wheezing, rhonchi or rales.   Abdominal:      General: There is no distension.      Tenderness: There is no abdominal tenderness. There is no guarding.   Musculoskeletal:         General: Tenderness and signs of injury present.      Right lower leg: No edema.      Left lower leg: No edema.      Comments: [place] to right ankle.  TTP over R hip  RLE splinted by Ortho  Sensation diminished to toes 2/2 neuropathy (chronic)   Neurological:      General: No focal deficit present.      Mental Status: She is alert and oriented to person, place, and time.      Comments: Some spontaneous myoclonic type jerks seen in UE during exam. Hyperreflexic to bilateral UE below elbows                Significant Labs: All pertinent labs within the past 24 hours have been reviewed.  BMP:   Recent Labs   Lab 08/15/24  0457   *      K 4.9      CO2 21*   BUN 36*   CREATININE 2.2*   CALCIUM 8.0*   MG 2.2     CBC:   Recent Labs   Lab 08/13/24  1650 08/14/24  0220 08/15/24  0457   WBC 10.49 11.26 8.20   HGB 10.9* 9.9* 8.5*   HCT 33.2* 30.9* 27.5*    175 136*       Significant Imaging: I  have reviewed all pertinent imaging results/findings within the past 24 hours.    Imaging Results              CT Ankle (Including Hindfoot) Without Contrast Right (Final result)  Result time 08/14/24 04:49:46      Final result by Portillo Oquendo MD (08/14/24 04:49:46)                   Impression:      Near anatomic alignment of complex mildly displaced distal tibia fracture and mildly displaced distal fibular fracture.    Electronically signed by resident: Hira Patel  Date:    08/14/2024  Time:    03:56    Electronically signed by: Portillo Oquendo MD  Date:    08/14/2024  Time:    04:49               Narrative:    EXAMINATION:  CT ANKLE (INCLUDING HINDFOOT) WITHOUT CONTRAST RIGHT; CT 3D RENDERING WO INDEPENDENT WORKSTATION    CLINICAL HISTORY:  Fracture, ankle;; Fracture, ankle;per order request;    TECHNIQUE:  Axial 0.625-mm images of the right ankle obtained without intravenous contrast.  Data submitted for coronal and sagittal reformats.  3D reconstructed images were acquired by post processing on an independent workstation with concurrent supervision and archived for permanent record. 3D imaging of the right ankle was obtained for further evaluation and operative planning if needed.    COMPARISON:  Ankle radiograph 03/14/2024.    FINDINGS:  Bones are demineralized.  There is a cast in place.  There is mildly displaced comminuted distal tibial fracture and a minimally displaced distal fibular fracture with medial angulation of the fracture fragment.  Intra-articular extension fracture fragments which are mildly displaced involving the distal tibia near anatomic alignment of fracture fragments.  Fractures involve the posterior and medial malleoli.  No dislocation.  There are a few foci of subcutaneous gas overlying the tibial fracture, possibly relating to trauma or reduction procedure though correlation is advised.  Dense calcific tendinosis of the posterior tibial tendon.  No full-thickness injury of  the Achilles tendon.  Soft tissue edema about the ankle.  Prominent vascular calcifications noted.                                       CT 3D Rendering WO Independent Workstation (Final result)  Result time 08/14/24 04:49:46      Final result by Portillo Oquendo MD (08/14/24 04:49:46)                   Impression:      Near anatomic alignment of complex mildly displaced distal tibia fracture and mildly displaced distal fibular fracture.    Electronically signed by resident: Hira Patel  Date:    08/14/2024  Time:    03:56    Electronically signed by: Portillo Oquendo MD  Date:    08/14/2024  Time:    04:49               Narrative:    EXAMINATION:  CT ANKLE (INCLUDING HINDFOOT) WITHOUT CONTRAST RIGHT; CT 3D RENDERING WO INDEPENDENT WORKSTATION    CLINICAL HISTORY:  Fracture, ankle;; Fracture, ankle;per order request;    TECHNIQUE:  Axial 0.625-mm images of the right ankle obtained without intravenous contrast.  Data submitted for coronal and sagittal reformats.  3D reconstructed images were acquired by post processing on an independent workstation with concurrent supervision and archived for permanent record. 3D imaging of the right ankle was obtained for further evaluation and operative planning if needed.    COMPARISON:  Ankle radiograph 03/14/2024.    FINDINGS:  Bones are demineralized.  There is a cast in place.  There is mildly displaced comminuted distal tibial fracture and a minimally displaced distal fibular fracture with medial angulation of the fracture fragment.  Intra-articular extension fracture fragments which are mildly displaced involving the distal tibia near anatomic alignment of fracture fragments.  Fractures involve the posterior and medial malleoli.  No dislocation.  There are a few foci of subcutaneous gas overlying the tibial fracture, possibly relating to trauma or reduction procedure though correlation is advised.  Dense calcific tendinosis of the posterior tibial tendon.  No  full-thickness injury of the Achilles tendon.  Soft tissue edema about the ankle.  Prominent vascular calcifications noted.                                       X-Ray Ankle Complete Right (Final result)  Result time 08/14/24 03:44:09      Final result by Portillo Oquendo MD (08/14/24 03:44:09)                   Impression:      Similar alignment of distal tibia and distal fibula fractures post reduction.      Electronically signed by: Portillo Oquendo MD  Date:    08/14/2024  Time:    03:44               Narrative:    EXAMINATION:  XR ANKLE COMPLETE 3 VIEW RIGHT    CLINICAL HISTORY:  Post reduction;    TECHNIQUE:  AP, lateral, and oblique images of the right ankle were performed.    COMPARISON:  08/13/2024.    FINDINGS:  Exam quality is limited by suboptimal positioning and overlapping cast material.  Acute fractures of the distal tibia and distal fibula as seen previously.  Bones are demineralized.  No gross dislocation or significant worsening alignment of fracture fragments.  Soft tissue edema.                                       CT Pelvis Without Contrast (Final result)  Result time 08/13/24 20:58:12      Final result by Quiana Chawla MD (08/13/24 20:58:12)                   Impression:      Acute traumatic fractures involving the right iliac wing, the anterior pillar of the right acetabulum and the lateral most aspect of the right superior ramus, and the right inferior pubic ramus.  No hip dislocation.      Electronically signed by: Quiana Chawla  Date:    08/13/2024  Time:    20:58               Narrative:    EXAMINATION:  CT PELVIS WITHOUT CONTRAST    CLINICAL HISTORY:  Pelvic trauma;    TECHNIQUE:  1.25 mm axial images were obtained through the pelvis from above the iliac crest through the upper femoral shafts.    COMPARISON:  Plain hip radiograph 08/13/2020 full    FINDINGS:  There is a minimally displaced fracture of the right iliac wing.  There are comminuted fractures involving the anterior  pillar of the right acetabulum and the lateral most aspect of the right superior ramus.  There is comminuted and overlapping fracture of the right inferior pubic ramus.  The hips are not dislocated.  The SI joints are intact.  There are degenerative changes seen at the sacroiliac joints and the pubic symphysis.  Bones are osteopenic.  Incidental note is made of vascular calcifications and a small fat containing umbilical hernia.                                       CT Lumbar Spine Without Contrast (Final result)  Result time 08/13/24 20:31:47      Final result by Quiana Chawla MD (08/13/24 20:31:47)                   Impression:      No acute lumbar fracture.  Multilevel degenerative change greatest at L4/L5.    Small bilateral pleural effusions right greater than left.    Gallbladder sludge or gravel-like gallstones.      Electronically signed by: Quiana Chawla  Date:    08/13/2024  Time:    20:31               Narrative:    EXAMINATION:  CT OF THE LUMBAR SPINE WITHOUT    CLINICAL HISTORY:  Complaining of right hip pain secondary to fall from standing.    TECHNIQUE:  1.25 mm axial images were obtained through the lumbar spine. Coronal and sagittal reformatted images were provided.    COMPARISON:  None.    FINDINGS:  There is no lumbar vertebral body fracture.  There is grade 1 anterolisthesis of L4 on L5.  At L3/L4, there is moderate central canal narrowing secondary to the facet arthrosis and ligamentum flavum hypertrophy without significant foraminal stenosis.  At L4/L5, there is no stone significant central canal narrowing, but there is bilateral mild foraminal narrowing.  Secondary to ligamentum flavum hypertrophy and facet arthrosis.  At L5/S1, there is a broad-based disc bulge without any significant central canal stenosis or foraminal stenosis.  There is small marginal osteophytes throughout.  Vacuum phenomena is seen at L4/L5 with mild intervertebral disc space narrowing.  Incidental note is made  of small bilateral pleural effusions right greater than left and gallbladder sludge or gravel-like gallstones.                                       X-Ray Chest AP Portable (Final result)  Result time 08/13/24 17:49:32      Final result by Eddie Montilla MD (08/13/24 17:49:32)                   Impression:      1. Interstitial findings may reflect edema versus chronic change, no large focal consolidation.      Electronically signed by: Eddie Montilla MD  Date:    08/13/2024  Time:    17:49               Narrative:    EXAMINATION:  XR CHEST AP PORTABLE    CLINICAL HISTORY:  Chest Pain;    TECHNIQUE:  Single frontal view of the chest was performed.    COMPARISON:  11/03/2023    FINDINGS:  The cardiomediastinal silhouette is prominent, magnified by technique, similar to the previous exam noting calcification of the aorta..  There is no pleural effusion.  The trachea is midline.  The lungs are symmetrically expanded bilaterally with coarse interstitial attenuation in a central hilar distribution..  No large focal consolidation seen.  There is no pneumothorax.  The osseous structures are remarkable for degenerative changes..                                       X-Ray Ankle Complete Right (Final result)  Result time 08/13/24 17:50:39      Final result by Eddie Montilla MD (08/13/24 17:50:39)                   Impression:      1. Distal tibial and fibular fractures as described.      Electronically signed by: Eddie Montilla MD  Date:    08/13/2024  Time:    17:50               Narrative:    EXAMINATION:  XR ANKLE COMPLETE 3 VIEW RIGHT    CLINICAL HISTORY:  Unspecified fall, initial encounter    TECHNIQUE:  AP, lateral, and oblique images of the right ankle were performed.    COMPARISON:  Radiograph of the foot 06/01/2021    FINDINGS:  Three views right ankle.    There is osteopenia.  There is acute appearing fracture involving the distal aspect of the fibula.  There is comminuted fracture of the medial  malleolus.  The ankle mortise is intact.  There is edema about the ankle.  The posterior aspect of the tibia appears intact.  There are degenerative changes of the calcaneus.  There is vascular calcification.                                       X-Ray Hip 2 or 3 views Right with Pelvis when performed (Final result)  Result time 08/13/24 17:53:13      Final result by Eddie Montilla MD (08/13/24 17:53:13)                   Impression:      1. Fractures of the right inferior and superior pubic rami.  2. There is cortical irregularity involving the right iliac wing, concerning for additional fracture.      Electronically signed by: Eddie Montilla MD  Date:    08/13/2024  Time:    17:53               Narrative:    EXAMINATION:  XR HIP WITH PELVIS WHEN PERFORMED 2 OR 3 VIEWS RIGHT    CLINICAL HISTORY:  Unspecified fall, initial encounter    TECHNIQUE:  AP view of the pelvis and frog leg lateral view of the right hip were performed.    COMPARISON:  None    FINDINGS:  Three views right hip.    The bilateral sacroiliac joints are intact.  The pubic symphysis is intact.  There is osteopenia.  There are fractures of the right superior and inferior pubic rami.  The bilateral femoroacetabular joints are intact noting degenerative change.  There is fracture involving the right iliac wing.                                       X-Ray Knee 1 or 2 View Right (Final result)  Result time 08/13/24 17:53:54   Procedure changed from X-Ray Knee 3 View Right     Final result by Eddie Montilla MD (08/13/24 17:53:54)                   Impression:      1. No acute displaced fracture or dislocation of the knee.      Electronically signed by: Eddie Montilla MD  Date:    08/13/2024  Time:    17:53               Narrative:    EXAMINATION:  XR KNEE 1 OR 2 VIEW RIGHT    CLINICAL HISTORY:  Pain;  Unspecified fall, initial encounter    TECHNIQUE:  AP and lateral views of the right knee were  performed.    COMPARISON:  11/20/2014    FINDINGS:  Two views right knee.    There is osteopenia.  There are degenerative changes of the knee.  There is vascular calcification.  No large right knee joint effusion.                                        Assessment/Plan:      Metabolic acidosis  Na bicarb started, chronic from ckd and likely will need long term      Tremor  Suspect possible from gabapentin as exam with some spotnaneous myoclonic jerking and hyperreflexic on exam and has been intermittent and unclear of timing, has seen neuro in clinic and had parkinsons ruled out she said but no further work up into cause  -hold gabapentin now, as variable cr may have worsened symptoms if related to this and see if improves, may take 48 hours to improve if so        Acute blood loss anemia  Anemia is likely due to acute blood loss which was from surgery . Most recent hemoglobin and hematocrit are listed below.  Recent Labs     08/13/24  1650 08/14/24  0220 08/15/24  0457   HGB 10.9* 9.9* 8.5*   HCT 33.2* 30.9* 27.5*     Plan  - Monitor serial CBC: Daily  - Transfuse PRBC if patient becomes hemodynamically unstable, symptomatic or H/H drops below 7/21.  - Patient has not received any PRBC transfusions to date  - Patient's anemia is currently  downtrend post op      Transaminitis  - AST/ ALT mildly elevated  - hold statin  -improving now and normal, can resume statin in 1-2 days    Multiple fractures of pelvis  Closed fracture of right iliac wing  Fracture of acetabulum  - AF, VSS  - Xray Hip showed fractures of the right inferior and superior pubic rami   - CT Pelvis showed acute traumatic fractures involving the right iliac wing, the anterior pillar of the right acetabulum and the lateral most aspect of the right superior ramus, and the right inferior pubic ramus   - Ortho consulted- and possible OR o 8/16 pending PT/OT evals today as would be a larger surgery requiring plates, etc so if was able to tolerate PT/OT  would consider non op management if possible so will f/u ortho resc further today, NWB to RLE either way for ankle for 8 weeks minimum  - multimodal pain regimen  - fall precautions  - on heparin for now given short half life, pending decisions    Chronic diastolic heart failure  - euvolemic on exam  -   - CXR showed interstitial findings may reflect edema versus chronic change, no pleural effusion  - pt is not on a diuretic at this time  - continue to monitor volume status closely  Results for orders placed during the hospital encounter of 11/03/23    Echo    Interpretation Summary    Left Ventricle: The left ventricle is normal in size. Increased wall thickness. Moderate septal thickening. Normal wall motion. There is normal systolic function with a visually estimated ejection fraction of 65 - 70%. Grade I diastolic dysfunction.    Right Ventricle: Normal right ventricular cavity size. Systolic function is normal.    Left Atrium: Left atrium is severely dilated.    Aortic Valve: There is moderate stenosis. Aortic valve area by VTI is 1.02 cm². Aortic valve peak velocity is 2.59 m/s. Mean gradient is 18 mmHg. The dimensionless index is 0.32.    Mitral Valve: There is mild regurgitation.    Tricuspid Valve: There is mild regurgitation.    Pulmonary Artery: The estimated pulmonary artery systolic pressure is 35 mmHg.    IVC/SVC: Normal venous pressure at 3 mmHg.        Stage 3a chronic kidney disease  Creatine stable for now. BMP reviewed- noted Estimated Creatinine Clearance: 26 mL/min (A) (based on SCr of 2.2 mg/dL (H)). according to latest data. Based on current GFR, CKD stage is stage 4 - GFR 15-29.  Monitor UOP and serial BMP and adjust therapy as needed. Renally dose meds. Avoid nephrotoxic medications and procedures.  - Cr 1.9 (near most recent baseline which is 1.6 to 1.8) on admit but 2.2 now so will do light IVF on 8/15  - daily BMP  - avoid nephrotoxic medications    Hypomagnesemia  Patient has  Abnormal Magnesium: hypomagnesemia. Will continue to monitor electrolytes closely. Will replace the affected electrolytes and repeat labs to be done after interventions completed. The patient's magnesium results have been reviewed and are listed below.  Recent Labs   Lab 08/15/24  0457   MG 2.2        Coronary artery disease of native artery of native heart with stable angina pectoris  Patient with known CAD which is controlled Will continue Statin and monitor for S/Sx of angina/ACS. Continue to monitor on telemetry.   Had stents placed in 2018 to rca and 2020 to 2 areas per dr cervantes note from [facility], had stress in 2022 that was negative for ischemia. She said he wanted to recheck another one recently but insurance did not cover. She said had aspirin allergy testing before it was restarted due to prev intolerance after a stent. Resume asa pending op plans.    Type 2 diabetes mellitus with stage 3 chronic kidney disease, with long-term current use of insulin  Hard to titrate at baseline and had to have doctors adjust a lot, runs very high at times up to 1100 she said once even.   Titrate 8/15, running higher as didn't get long acting yesterday and adjusted today and will adjsut further. Has some allergies to some insulins. On tresiba at home.  - low dose SSI  - diabetic diet  - POCT checks    Mixed hyperlipidemia  - continue home statin    Primary hypertension  - Chronic, controlled  - continue home imdur and metoprolol    Anxiety  - hx noted  - continue home fluoxetine   High anxiety at baseline per her and daugher, doctors have been hesitant to do acute anxiety meds due to fall risk they said, was not on on admit, high anxiety 8/15, will try ativan x 1 as suspect having possible panic attack after therapy sesion based on nusring description      VTE Risk Mitigation (From admission, onward)           Ordered     heparin (porcine) injection 5,000 Units  Every 8 hours         08/14/24 1820     Reason for No  Pharmacological VTE Prophylaxis  Once        Question:  Reasons:  Answer:  Risk of Bleeding    08/14/24 0120     IP VTE HIGH RISK PATIENT  Once         08/14/24 0120                    Discharge Planning   MIKHAIL: 8/19/2024     Code Status: Full Code   Is the patient medically ready for discharge?:     Reason for patient still in hospital (select all that apply): Patient trending condition  Discharge Plan A: Skilled Nursing Facility                  Rupal Ochoa MD  Department of [facility]   [facility]

## 2024-08-15 NOTE — ANESTHESIA PREPROCEDURE EVALUATION
08/15/2024  [facility]  Anesthesia Pre-Operative Evaluation         Patient Name: Lorena Contreras  YOB: 1950  MRN: [id number]    SUBJECTIVE:     Pre-operative evaluation for Procedure(s) (LRB):  ORIF, FRACTURE, ACETABULUM (open) - trios, supine, pelvic bone foam. [place] pelvic plates, retractors, reduction stuff. 8.0 cannulated screws, 5mm schanz pins. Triangle (Right)     08/15/2024    Lorena Contreras is a 73 y.o. female w/ a significant PMHx of DM2, Fibromyalgia, HTN, HLD, CVA, MI, and CAD .    Patient underwent ORIF right ankle pilon fracture on 8/14/2024, to return to OR for a ORIF right acetabular fracture.    Patient now presents for the above procedure(s).      LDA:        Peripheral IV - Single Lumen 08/15/24 0455 22 G No Left;Posterior Hand (Active)   Number of days: 0            Urethral Catheter 08/14/24 0251 18 Fr. (Active)   Site Assessment Clean;Intact 08/14/24 2000   Collection Container Urimeter 08/14/24 2000   Securement Method secured to top of thigh w/ adhesive device 08/14/24 2000   Reason for Continuing Urinary Catheterization Post operative 08/14/24 1615   Output (mL) 600 mL 08/14/24 0945   Number of days: 1       Prev airway:   Date/Time: 8/14/2024 11:24 AM     Performed by: David Medina, CRNA  Authorized by: Lyric Sandhu MD    Intubation:     Induction:  Intravenous    Intubated:  Postinduction    Mask Ventilation:  Easy mask    Attempts:  1    Attempted By:  Student    Method of Intubation:  Direct    Blade:  [place] 2    Laryngeal View Grade: Grade I - full view of cords      Difficult Airway Encountered?: No      Complications:  None    Airway Device:  Oral endotracheal tube    Airway Device Size:  7.0    Style/Cuff Inflation:  Cuffed (inflated to minimal occlusive pressure)    Secured at:  The lips    Placement Verified By:   Capnometry    Complicating Factors:  None    Findings Post-Intubation:  BS equal bilateral and atraumatic/condition of teeth unchanged    2D Echo:        Results for orders placed or performed during the hospital encounter of 12/16/15   2D Echo w/ Color Flow Doppler   Result Value Ref Range     EF + QEF 55 55 - 65     Mitral Valve Regurgitation TRIVIAL       Diastolic Dysfunction Yes (A)       Est. PA Systolic Pressure 36.48       Tricuspid Valve Regurgitation       Stress Test:   Results for orders placed during the hospital encounter of 11/23/22     Nuclear Stress - Cardiology Interpreted     Interpretation Summary    Normal myocardial perfusion scan. There is no evidence of myocardial ischemia or infarction.    The gated perfusion images showed an ejection fraction of 71% post stress.    The EKG portion of this study is negative for ischemia.    The patient reported no chest pain during the stress test.    There were no arrhythmias during stress.    Patient Active Problem List   Diagnosis    Anxiety    Primary hypertension    Follicular lymphoma    Mixed hyperlipidemia    Cardiomegaly    Type 2 diabetes mellitus with stage 3 chronic kidney disease, with long-term current use of insulin    Mild nonproliferative diabetic retinopathy of both eyes associated with type 2 diabetes mellitus    Coronary artery disease of native artery of native heart with stable angina pectoris    Hypomagnesemia    Stage 3a chronic kidney disease    Pulmonary hypertension -- echo 11/2019    History of myocardial infarction    Peripheral vascular disease in diabetes mellitus -- CLEMENT 05/2019    Iron deficiency anemia    History of TIA (transient ischemic attack)    RLS (restless legs syndrome)    Aortic atherosclerosis    Osteopenia of neck of femur    Chronic diastolic heart failure    Proliferative diabetic retinopathy of left eye associated with type 2 diabetes mellitus, unspecified proliferative retinopathy type    Hypertensive  encephalopathy    Status post tooth extraction    Uncontrolled type 2 diabetes mellitus with hyperglycemia, with long-term current use of insulin    Elevated troponin    Acute cystitis    Post herpetic neuralgia    Aortic stenosis    Chronic bronchitis, unspecified chronic bronchitis type    Stage 3b chronic kidney disease    Closed fracture of right iliac wing    Multiple fractures of pelvis    Closed nondisplaced fracture of anterior column of right acetabulum    Transaminitis    Closed trimalleolar fracture of right ankle with routine healing    Closed fracture of superior and inferior rami of right pubis    Acute blood loss anemia       Review of patient's allergies indicates:   Allergen Reactions    Novolin 70/30 (semi-synthetic) Nausea And Vomiting     Severe vomiting on Relion 70/30    Sulfa (sulfonamide antibiotics) Anaphylaxis    Talwin [pentazocine lactate] Anaphylaxis    Victoza [liraglutide] Nausea And Vomiting    Glipizide Nausea Only    Citric acid     Codeine     Influenza virus vaccines Hives    Iodine and iodide containing products Hives    Levetiracetam Itching    Rituxan [rituximab] Hives    Zoloft [sertraline] Nausea And Vomiting       Current Outpatient Medications:    Current Facility-Administered Medications:     acetaminophen tablet 1,000 mg, 1,000 mg, Oral, Q8H, Denise Hart PA-C, 1,000 mg at 08/15/24 0537    albuterol-ipratropium 2.5 mg-0.5 mg/3 mL nebulizer solution 3 mL, 3 mL, Nebulization, Q4H PRN, Denise Hart PA-C    bisacodyL suppository 10 mg, 10 mg, Rectal, Daily PRN, Denise Hart PA-C    ceFAZolin 2 g in D5W 50 mL IVPB (MB+), 2 g, Intravenous, Q8H, Davian Reeves MD, Stopped at 08/15/24 0433    dextrose 10% bolus 125 mL 125 mL, 12.5 g, Intravenous, PRN, Denise Hart PA-C    dextrose 10% bolus 250 mL 250 mL, 25 g, Intravenous, PRN, Denise Hart PA-C    diphenhydrAMINE capsule 25 mg, 25 mg, Oral, Q6H PRN, Daniela Abernathy MD    FLUoxetine  capsule 40 mg, 40 mg, Oral, Daily, Denise Hart PA-C    gabapentin capsule 300 mg, 300 mg, Oral, TID, Denise Hart PA-C, 300 mg at 08/14/24 2110    glucagon (human recombinant) injection 1 mg, 1 mg, Intramuscular, PRN, Denise Hart PA-C    glucose chewable tablet 16 g, 16 g, Oral, PRN, Denise Hart PA-C    glucose chewable tablet 24 g, 24 g, Oral, PRN, Denise Hart PA-C    heparin (porcine) injection 5,000 Units, 5,000 Units, Subcutaneous, Q8H, Daniela Abernathy MD, 5,000 Units at 08/15/24 0538    insulin aspart U-100 pen 0-5 Units, 0-5 Units, Subcutaneous, Q6H PRN, Denise Hart PA-C, 1 Units at 08/14/24 2226    insulin aspart U-100 pen 10 Units, 10 Units, Subcutaneous, TIDWM, Rupal Ochoa MD    insulin glargine U-100 (Lantus) pen 14 Units, 14 Units, Subcutaneous, Daily, Rupal Ochoa MD    isosorbide mononitrate 24 hr tablet 30 mg, 30 mg, Oral, Daily, Denise Hart PA-C    melatonin tablet 6 mg, 6 mg, Oral, Nightly PRN, Denise Hart PA-C    methocarbamoL tablet 500 mg, 500 mg, Oral, QID, Denise Hart PA-C, 500 mg at 08/14/24 2110    metoprolol succinate (TOPROL-XL) 24 hr tablet 25 mg, 25 mg, Oral, Daily, Denise Hart PA-C    morphine injection 2 mg, 2 mg, Intravenous, Q6H PRN, Denise Hart PA-C    ondansetron disintegrating tablet 8 mg, 8 mg, Oral, Q8H PRN, Denise Hart PA-C    oxyCODONE immediate release tablet 5 mg, 5 mg, Oral, Q6H PRN, Denise Hart PA-C    oxyCODONE immediate release tablet Tab 10 mg, 10 mg, Oral, Q6H PRN, Denise Hart PA-C, 10 mg at 08/14/24 0345    pantoprazole EC tablet 40 mg, 40 mg, Oral, Daily, Denise Hart PA-C    polyethylene glycol packet 17 g, 17 g, Oral, Daily PRN, Denise Hart PA-C    promethazine tablet 25 mg, 25 mg, Oral, Q6H PRN, Denise Hart PA-C, 25 mg at 08/14/24 0345    sodium bicarbonate tablet 650 mg, 650 mg, Oral, BID, Daniela Abernathy,  MD, 650 mg at 08/14/24 2110    sodium chloride 0.9% flush 10 mL, 10 mL, Intravenous, PRN, eDnise Hart PA-C    Past Surgical History:   Procedure Laterality Date    COLONOSCOPY  11/07/2012    Colon polyp found; repeat in 5 years    ELBOW SURGERY Right 2015    dislocation repair     ESOPHAGOGASTRODUODENOSCOPY  11/07/2012    atrophic gastritis, H pylori testing negative    INCISION AND DRAINAGE FOOT Right 6/2/2021    Procedure: INCISION AND DRAINAGE, FOOT, bone biopsy;  Surgeon: Quiana Penn DPM;  Location: [facility] OR;  Service: Podiatry;  Laterality: Right;    KNEE SURGERY Bilateral 2015    scoped    LEFT HEART CATHETERIZATION Left 3/29/2019    Procedure: Left heart cath;  Surgeon: Bladimir Barbosa MD;  Location: [facility] CATH LAB;  Service: Cardiology;  Laterality: Left;    LEFT HEART CATHETERIZATION Left 11/18/2019    Procedure: Left heart cath;  Surgeon: Karl Rico MD;  Location: [facility] CATH LAB;  Service: Cardiology;  Laterality: Left;    LEFT HEART CATHETERIZATION Left 1/8/2020    Procedure: Left heart cath, right radial, noon start;  Surgeon: Christos Monreal MD;  Location: [facility] CATH LAB;  Service: Cardiology;  Laterality: Left;  RN Pre Op 1-6-20.  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  2 dtr.  Lives with .  3 cats and a dog.  Retired.  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  Lives in  house, 1 story and 4 steps up and has a ramp.      Enjoys crafting.  Unable to bowl due to myalgias.       Social Determinants of Health     Financial Resource Strain: Low Risk  (8/14/2024)    Overall Financial Resource Strain (CARDIA)     Difficulty of Paying Living Expenses: Not hard at all   Food Insecurity: No Food Insecurity (8/14/2024)    Hunger Vital Sign     Worried About Running Out of Food in the Last Year: Never true     Ran Out of Food in the Last Year: Never true   Transportation Needs: No Transportation Needs (8/14/2024)    TRANSPORTATION NEEDS     Transportation : No   Physical Activity: Inactive (8/14/2024)    Exercise Vital Sign     Days of Exercise per Week: 0 days     Minutes of Exercise per Session: 0 min   Stress: No Stress Concern Present (8/14/2024)    [employer] [place] [employer] - Occupational Stress Questionnaire     Feeling of Stress : Not at all   Housing Stability: Low Risk  (8/14/2024)    Housing Stability Vital Sign     Unable to Pay for Housing in the Last Year: No     Homeless in the Last Year: No       OBJECTIVE:     Vital Signs Range (Last 24H):  Temp:  [36.2 °C (97.1 °F)-37.1 °C (98.7 °F)]   Pulse:  [75-86]   Resp:  [14-23]   BP: ()/(48-79)   SpO2:  [92 %-100 %]       Significant Labs:  Lab Results   Component Value Date    WBC 8.20 08/15/2024    HGB 8.5 (L) 08/15/2024    HCT 27.5 (L) 08/15/2024     (L) 08/15/2024    CHOL 132 07/10/2024    TRIG 147 07/10/2024    HDL 42 07/10/2024    ALT 20 08/15/2024    AST 29 08/15/2024     08/15/2024    K 4.9 08/15/2024     08/15/2024    CREATININE 2.2 (H) 08/15/2024    BUN 36 (H) 08/15/2024    CO2 21 (L) 08/15/2024    TSH 0.602 04/10/2023    INR 1.0 04/10/2023    HGBA1C 8.2 (H) 07/10/2024       Diagnostic Studies: No relevant studies.    EKG:   Results for orders placed or performed during the hospital encounter of 08/13/24   EKG 12-lead    Collection Time: 08/13/24  7:49 PM   Result Value Ref Range    QRS  Duration 74 ms    OHS QTC Calculation 504 ms    Narrative    Test Reason : R07.9,    Vent. Rate : 091 BPM     Atrial Rate : 091 BPM     P-R Int : 168 ms          QRS Dur : 074 ms      QT Int : 410 ms       P-R-T Axes : 076 014 050 degrees     QTc Int : 504 ms    Normal sinus rhythm  Possible Inferior infarct (cited on or before 14-JAN-2023)  Cannot rule out Anterior infarct ,age undetermined  Abnormal ECG  When compared with ECG of 03-NOV-2023 13:30,  No significant change was found  Confirmed by Greg Keith MD ([id number]) on 8/14/2024 7:48:37 PM    Referred By: [email or web address]   SELF           Confirmed By:Greg Keith MD       2D ECHO:  TTE:  Results for orders placed or performed during the hospital encounter of 11/03/23   Echo   Result Value Ref Range    BSA 1.99 m2    LVOT stroke volume 58.09 cm3    LVIDd 4.33 3.5 - 6.0 cm    LV Systolic Volume 32.06 mL    LV Systolic Volume Index 16.3 mL/m2    LVIDs 2.89 2.1 - 4.0 cm    LV Diastolic Volume 84.22 mL    LV Diastolic Volume Index 42.75 mL/m2    IVS 1.19 (A) 0.6 - 1.1 cm    LVOT diameter 2.00 cm    LVOT area 3.1 cm2    FS 33 28 - 44 %    Left Ventricle Relative Wall Thickness 0.74 cm    Posterior Wall 1.60 (A) 0.6 - 1.1 cm    LV mass 233.36 g    LV Mass Index 118 g/m2    MV Peak E Fernando 1.42 m/s    TDI LATERAL 0.06 m/s    TDI SEPTAL 0.05 m/s    E/E' ratio 25.82 m/s    MV Peak A Fernando 1.41 m/s    TR Max Fernando 2.83 m/s    E/A ratio 1.01     IVRT 95.15 msec    E wave deceleration time 244.20 msec    LV SEPTAL E/E' RATIO 28.40 m/s    LV LATERAL E/E' RATIO 23.67 m/s    LVOT peak fernando 0.75 m/s    Left Ventricular Outflow Tract Mean Velocity 0.55 cm/s    Left Ventricular Outflow Tract Mean Gradient 1.38 mmHg    RVDD 3.46 cm    RV S' 15.82 cm/s    TAPSE 2.01 cm    LA size 4.11 cm    Left Atrium Minor Axis 5.85 cm    Left Atrium Major Axis 6.22 cm    RA Major Axis 5.59 cm    AV mean gradient 18 mmHg    AV peak gradient 27 mmHg    Ao peak fernando 2.59 m/s    Ao VTI 57.10 cm    LVOT peak  VTI 18.50 cm    AV valve area 1.02 cm²    AV Velocity Ratio 0.29     AV index (prosthetic) 0.32     ZENON by Velocity Ratio 0.91 cm²    MV stenosis pressure 1/2 time 70.82 ms    MV valve area p 1/2 method 3.11 cm2    Triscuspid Valve Regurgitation Peak Gradient 32 mmHg    PV PEAK VELOCITY 1.24 m/s    PV peak gradient 6 mmHg    Sinus 2.75 cm    IVC diameter 1.64 cm    Mean e' 0.06 m/s    ZLVIDS -1.47     ZLVIDD -2.68     LA Volume Index 55.6 mL/m2    LA volume 109.53 cm3    LA WIDTH 5.2 cm    RA Width 3.4 cm    TV resting pulmonary artery pressure 35 mmHg    RV TB RVSP 6 mmHg    Est. RA pres 3 mmHg    Narrative      Left Ventricle: The left ventricle is normal in size. Increased wall   thickness. Moderate septal thickening. Normal wall motion. There is normal   systolic function with a visually estimated ejection fraction of 65 - 70%.   Grade I diastolic dysfunction.    Right Ventricle: Normal right ventricular cavity size. Systolic   function is normal.    Left Atrium: Left atrium is severely dilated.    Aortic Valve: There is moderate stenosis. Aortic valve area by VTI is   1.02 cm². Aortic valve peak velocity is 2.59 m/s. Mean gradient is 18   mmHg. The dimensionless index is 0.32.    Mitral Valve: There is mild regurgitation.    Tricuspid Valve: There is mild regurgitation.    Pulmonary Artery: The estimated pulmonary artery systolic pressure is   35 mmHg.    IVC/SVC: Normal venous pressure at 3 mmHg.         CASSANDRA:  No results found. However, due to the size of the patient record, not all encounters were searched. Please check Results Review for a complete set of results.    ASSESSMENT/PLAN:         Pre-op Assessment    I have reviewed the Patient Summary Reports.     I have reviewed the Nursing Notes. I have reviewed the NPO Status.   I have reviewed the Medications.     Review of Systems  Anesthesia Hx:   History of prior surgery of interest to airway management or planning:            Denies Personal Hx of  Anesthesia complications.                    Cardiovascular:     Hypertension  Past MI CAD   CABG/stent   Angina         See stent report attached   Cardiovascular Symptoms: Angina       Coronary Artery Disease:          Hx of Myocardial Infarction                  Hypertension         Renal/:  Chronic Renal Disease, CKD        Kidney Function/Disease             Hepatic/GI:     GERD             Neurological:   CVA Neuromuscular Disease,   Seizures           Seizure Disorder             CVA - Cerebrovasular Accident               Neuromuscular Disease   Endocrine:  Diabetes    Diabetes                      Psych:  Psychiatric History                Physical Exam  General: Well nourished, Cooperative, Alert and Oriented    Airway:  Mallampati: II   Mouth Opening: Normal  TM Distance: Normal  Tongue: Normal  Neck ROM: Normal ROM    Dental:  Intact        Anesthesia Plan  Type of Anesthesia, risks & benefits discussed:    Anesthesia Type: Gen ETT  Intra-op Monitoring Plan: Standard ASA Monitors  Post Op Pain Control Plan: multimodal analgesia and IV/PO Opioids PRN  Induction:  IV  Airway Plan: Direct and Video, Post-Induction  Informed Consent: Informed consent signed with the Patient and all parties understand the risks and agree with anesthesia plan.  All questions answered.   ASA Score: 3  Day of Surgery Review of History & Physical: H&P Update referred to the surgeon/provider.    Ready For Surgery From Anesthesia Perspective.     .

## 2024-08-15 NOTE — ASSESSMENT & PLAN NOTE
Hard to titrate at baseline and had to have doctors adjust a lot, runs very high at times up to 1100 she said once even.   Titrate 8/15, running higher as didn't get long acting yesterday and adjusted today and will adjsut further. Has some allergies to some insulins. On tresiba at home.  - low dose SSI  - diabetic diet  - POCT checks

## 2024-08-15 NOTE — ASSESSMENT & PLAN NOTE
- AST/ ALT mildly elevated  - hold statin  -improving now and normal, can resume statin in 1-2 days

## 2024-08-15 NOTE — SUBJECTIVE & OBJECTIVE
Interval history- she had ORIF to right ankle with ortho yesterday and NWB to RLE for at least 8 weeks. Plan for possible fixation of pelvis tomorrow pending how PT/OT goes today as would be a larger surgery so initially was definitely plan but now ortho told her this mornign and her daughter they would like to see how therapy goes first and then decide today based on that. Will discuss with later with ortho to  follow up and then will update daugher as live on [facility] and would want to be here early tomorrow if plan to do surgery tomrorow. Hg 8.5 cr higher than baselien at 2.2 with baseline closer to 1.6 to 1.8 and some light IVF today. She does have times where oral itnake can decrease. Her glucose has been hard to control per daughter and had to be adjusted multiple times as outpatient and is in 200s now but wasn't given long acting insulin yesterday so ordered for this AM and adusted short acting as well. Her daughter reports high anxiety at [facility] and the patient endorses as well. She got very anxious in [facility] ER yseterady and had bad experience with tthe doctor yelling at her there to calm down after she was having reaction to morphine. She said she does crafts at home to try to calm herself at times, doctors have been reluctant to give her anxiety meds as they say she's a fall risk and then they asid this happened anyways even without it now. She has been having trmors/jerking in UE causing her to spill coffee, water at timse and it  comes and goes. She saw neuro who told her it wasn't parkinsons but didn't delve into other causes she said. She has not been told it could be related to her gabapentin. She is uinsure if she's been taking religiously lately. She had been off of it and then restarted last October after her shingles episode. She doesn't like taking it as causes her to get mean so was unsure if she was still taking all the time. She can't remmember if it started when she started taking it  again. She has some signs of myoclonic jerking on exam and is hypperreflexic in UE on exam. Discussed plan with her and daughter at bedside at length. After exam nursing reported tremors worsened and patient endorses seeing thinsg, suspsct seconday to high anxiety state and will try ativan once to see if calming down and relaxing will help as high suspicion is from this after long discussion with her and her daugher earlier this morning.        Review of patient's allergies indicates:   Allergen Reactions    Novolin 70/30 (semi-synthetic) Nausea And Vomiting     Severe vomiting on Relion 70/30    Sulfa (sulfonamide antibiotics) Anaphylaxis    Talwin [pentazocine lactate] Anaphylaxis    Victoza [liraglutide] Nausea And Vomiting    Glipizide Nausea Only    Citric acid     Codeine     Influenza virus vaccines Hives    Iodine and iodide containing products Hives    Levetiracetam Itching    Rituxan [rituximab] Hives    Zoloft [sertraline] Nausea And Vomiting       No current facility-administered medications on file prior to encounter.     Current Outpatient Medications on File Prior to Encounter   Medication Sig    Bacillus coagulans (DIGESTIVE ADVANTAGE IMMUNE ORAL) Take by mouth.    diclofenac sodium (VOLTAREN TOP) Apply topically.    gabapentin (NEURONTIN) 300 MG capsule Take 1 capsule (300 mg total) by mouth 3 (three) times daily.    hydrALAZINE (APRESOLINE) 50 MG tablet Take 1 tablet (50 mg total) by mouth every 8 (eight) hours.    LANTUS SOLOSTAR U-100 INSULIN 100 unit/mL (3 mL) InPn pen Inject 12 Units into the skin every evening.    NOVOLOG FLEXPEN U-100 INSULIN 100 unit/mL (3 mL) InPn pen Inject 20 Units into the skin 3 (three) times daily with meals.    albuterol (PROVENTIL/VENTOLIN HFA) 90 mcg/actuation inhaler Inhale 2 puffs into the lungs every 6 (six) hours as needed for Wheezing or Shortness of Breath.    ASCORBIC ACID, VITAMIN C, ORAL Take by mouth once daily.    aspirin 81 MG Chew Take 1 tablet (81 mg  "total) by mouth once daily.    atorvastatin (LIPITOR) 80 MG tablet Take 1 tablet by mouth in the evening    cholecalciferol, vitamin D3, (VITAMIN D3) 50 mcg (2,000 unit) Cap Take 2 capsules by mouth 2 (two) times daily.    cyanocobalamin (VITAMIN B-12) 1000 MCG tablet Take 100 mcg by mouth once daily.    DEXCOM G7  Misc Use 1  to track blood glucose, ICD10: E11.65    DEXCOM G7 SENSOR Samina Use 1 sensor every 10 days to track blood glucose, ICD10: E11.65, okay with 90 day supply if possible    EPINEPHrine (EPIPEN) 0.3 mg/0.3 mL AtIn Inject 0.3 mLs (0.3 mg total) into the muscle once. for 1 dose    ESOMEPRAZOLE MAGNESIUM ORAL Take by mouth as needed. (Patient not taking: Reported on 5/28/2024)    FLUoxetine 40 MG capsule Take 1 capsule (40 mg total) by mouth once daily.    isosorbide mononitrate (IMDUR) 30 MG 24 hr tablet Take 1 tablet (30 mg total) by mouth once daily.    LIDOcaine (LIDODERM) 5 % Place 1 patch onto the skin once daily. Remove & Discard patch within 12 hours or as directed by MD.    LORazepam (ATIVAN) 1 MG tablet Take 1 tablet (1 mg total) by mouth 2 (two) times daily as needed for Anxiety.    magnesium oxide (MAG-OX) 400 mg tablet Take 400 mg by mouth once daily.    melatonin 10 mg Cap Take 10 mg by mouth nightly.    metoprolol succinate (TOPROL-XL) 25 MG 24 hr tablet Take 1 tablet (25 mg total) by mouth once daily.    multivitamin/iron/folic acid (CENTRUM COMPLETE ORAL) Take by mouth.    OZEMPIC 1 mg/dose (4 mg/3 mL) Inject 1 mg into the skin every 7 days.    pantoprazole (PROTONIX) 40 MG tablet Take 1 tablet (40 mg total) by mouth once daily.    pen needle, diabetic (BD ULTRA-FINE SAGAR PEN NEEDLE) 32 gauge x 5/32" Ndle One pen needle use with insulin pen 4 times a day.  ICD-10: E11.9    semaglutide (OZEMPIC) 0.25 mg or 0.5 mg (2 mg/3 mL) pen injector Inject 0.5 mg once weekly thereafter    ticagrelor (BRILINTA) 90 mg tablet Take 1 tablet (90 mg total) by mouth 2 (two) times daily.    " vitamin E 1000 UNIT capsule Take 1,000 Units by mouth once daily.     Family History       Problem Relation (Age of Onset)    Allergy (severe) Daughter    Cancer Mother, Father    Diabetes Sister    Heart disease Mother    Hypertension Sister    Lung cancer Brother    No Known Problems Daughter          Tobacco Use    Smoking status: Never    Smokeless tobacco: Never   Substance and Sexual Activity    Alcohol use: Not Currently    Drug use: Never    Sexual activity: Not Currently     Partners: Male     Review of Systems   Constitutional:  Positive for activity change. Negative for chills and fever.   HENT:  Negative for congestion, hearing loss, rhinorrhea and sore throat.    Eyes:  Negative for visual disturbance.   Respiratory:  Negative for shortness of breath.    Cardiovascular:  Negative for chest pain and leg swelling.   Gastrointestinal:  Negative for abdominal pain, constipation, diarrhea, nausea and vomiting.   Genitourinary:  Negative for dysuria, frequency and urgency.   Musculoskeletal:  Positive for arthralgias and myalgias.   Neurological:  Positive for numbness (chronic).     Objective:     Vital Signs (Most Recent):  Temp: 98.3 °F (36.8 °C) (08/15/24 1138)  Pulse: 81 (08/15/24 1138)  Resp: 17 (08/15/24 1138)  BP: (!) 112/56 (08/15/24 1138)  SpO2: 95 % (08/15/24 1138) Vital Signs (24h Range):  Temp:  [97.7 °F (36.5 °C)-98.7 °F (37.1 °C)] 98.3 °F (36.8 °C)  Pulse:  [75-88] 81  Resp:  [14-23] 17  SpO2:  [92 %-100 %] 95 %  BP: ()/(48-70) 112/56     Weight: 81.6 kg (179 lb 14.3 oz)  Body mass index is 26.57 kg/m².     Physical Exam  Constitutional:       General: She is not in acute distress.     Appearance: Normal appearance. She is not ill-appearing.      Comments: Anxious. Daughter at bedside.    HENT:      Head: Normocephalic and atraumatic.   Eyes:      Extraocular Movements: Extraocular movements intact.   Cardiovascular:      Rate and Rhythm: Normal rate and regular rhythm.      Heart sounds:  Murmur heard.      No friction rub. No gallop.   Pulmonary:      Effort: Pulmonary effort is normal.      Breath sounds: Normal breath sounds. No wheezing, rhonchi or rales.   Abdominal:      General: There is no distension.      Tenderness: There is no abdominal tenderness. There is no guarding.   Musculoskeletal:         General: Tenderness and signs of injury present.      Right lower leg: No edema.      Left lower leg: No edema.      Comments: [place] to right ankle.  TTP over R hip  RLE splinted by Ortho  Sensation diminished to toes 2/2 neuropathy (chronic)   Neurological:      General: No focal deficit present.      Mental Status: She is alert and oriented to person, place, and time.      Comments: Some spontaneous myoclonic type jerks seen in UE during exam. Hyperreflexic to bilateral UE below elbows                Significant Labs: All pertinent labs within the past 24 hours have been reviewed.  BMP:   Recent Labs   Lab 08/15/24  0457   *      K 4.9      CO2 21*   BUN 36*   CREATININE 2.2*   CALCIUM 8.0*   MG 2.2     CBC:   Recent Labs   Lab 08/13/24  1650 08/14/24  0220 08/15/24  0457   WBC 10.49 11.26 8.20   HGB 10.9* 9.9* 8.5*   HCT 33.2* 30.9* 27.5*    175 136*       Significant Imaging: I have reviewed all pertinent imaging results/findings within the past 24 hours.    Imaging Results              CT Ankle (Including Hindfoot) Without Contrast Right (Final result)  Result time 08/14/24 04:49:46      Final result by Portillo Oquendo MD (08/14/24 04:49:46)                   Impression:      Near anatomic alignment of complex mildly displaced distal tibia fracture and mildly displaced distal fibular fracture.    Electronically signed by resident: Hira Patel  Date:    08/14/2024  Time:    03:56    Electronically signed by: Portillo Oquendo MD  Date:    08/14/2024  Time:    04:49               Narrative:    EXAMINATION:  CT ANKLE (INCLUDING HINDFOOT) WITHOUT CONTRAST RIGHT;  CT 3D RENDERING WO INDEPENDENT WORKSTATION    CLINICAL HISTORY:  Fracture, ankle;; Fracture, ankle;per order request;    TECHNIQUE:  Axial 0.625-mm images of the right ankle obtained without intravenous contrast.  Data submitted for coronal and sagittal reformats.  3D reconstructed images were acquired by post processing on an independent workstation with concurrent supervision and archived for permanent record. 3D imaging of the right ankle was obtained for further evaluation and operative planning if needed.    COMPARISON:  Ankle radiograph 03/14/2024.    FINDINGS:  Bones are demineralized.  There is a cast in place.  There is mildly displaced comminuted distal tibial fracture and a minimally displaced distal fibular fracture with medial angulation of the fracture fragment.  Intra-articular extension fracture fragments which are mildly displaced involving the distal tibia near anatomic alignment of fracture fragments.  Fractures involve the posterior and medial malleoli.  No dislocation.  There are a few foci of subcutaneous gas overlying the tibial fracture, possibly relating to trauma or reduction procedure though correlation is advised.  Dense calcific tendinosis of the posterior tibial tendon.  No full-thickness injury of the Achilles tendon.  Soft tissue edema about the ankle.  Prominent vascular calcifications noted.                                       CT 3D Rendering WO Independent Workstation (Final result)  Result time 08/14/24 04:49:46      Final result by Portillo Oquendo MD (08/14/24 04:49:46)                   Impression:      Near anatomic alignment of complex mildly displaced distal tibia fracture and mildly displaced distal fibular fracture.    Electronically signed by resident: Hira Patel  Date:    08/14/2024  Time:    03:56    Electronically signed by: Portillo Oquendo MD  Date:    08/14/2024  Time:    04:49               Narrative:    EXAMINATION:  CT ANKLE (INCLUDING HINDFOOT) WITHOUT  CONTRAST RIGHT; CT 3D RENDERING WO INDEPENDENT WORKSTATION    CLINICAL HISTORY:  Fracture, ankle;; Fracture, ankle;per order request;    TECHNIQUE:  Axial 0.625-mm images of the right ankle obtained without intravenous contrast.  Data submitted for coronal and sagittal reformats.  3D reconstructed images were acquired by post processing on an independent workstation with concurrent supervision and archived for permanent record. 3D imaging of the right ankle was obtained for further evaluation and operative planning if needed.    COMPARISON:  Ankle radiograph 03/14/2024.    FINDINGS:  Bones are demineralized.  There is a cast in place.  There is mildly displaced comminuted distal tibial fracture and a minimally displaced distal fibular fracture with medial angulation of the fracture fragment.  Intra-articular extension fracture fragments which are mildly displaced involving the distal tibia near anatomic alignment of fracture fragments.  Fractures involve the posterior and medial malleoli.  No dislocation.  There are a few foci of subcutaneous gas overlying the tibial fracture, possibly relating to trauma or reduction procedure though correlation is advised.  Dense calcific tendinosis of the posterior tibial tendon.  No full-thickness injury of the Achilles tendon.  Soft tissue edema about the ankle.  Prominent vascular calcifications noted.                                       X-Ray Ankle Complete Right (Final result)  Result time 08/14/24 03:44:09      Final result by Portillo Oquendo MD (08/14/24 03:44:09)                   Impression:      Similar alignment of distal tibia and distal fibula fractures post reduction.      Electronically signed by: Portillo Oquendo MD  Date:    08/14/2024  Time:    03:44               Narrative:    EXAMINATION:  XR ANKLE COMPLETE 3 VIEW RIGHT    CLINICAL HISTORY:  Post reduction;    TECHNIQUE:  AP, lateral, and oblique images of the right ankle were  performed.    COMPARISON:  08/13/2024.    FINDINGS:  Exam quality is limited by suboptimal positioning and overlapping cast material.  Acute fractures of the distal tibia and distal fibula as seen previously.  Bones are demineralized.  No gross dislocation or significant worsening alignment of fracture fragments.  Soft tissue edema.                                       CT Pelvis Without Contrast (Final result)  Result time 08/13/24 20:58:12      Final result by Quiana Chawla MD (08/13/24 20:58:12)                   Impression:      Acute traumatic fractures involving the right iliac wing, the anterior pillar of the right acetabulum and the lateral most aspect of the right superior ramus, and the right inferior pubic ramus.  No hip dislocation.      Electronically signed by: Quiana Chawla  Date:    08/13/2024  Time:    20:58               Narrative:    EXAMINATION:  CT PELVIS WITHOUT CONTRAST    CLINICAL HISTORY:  Pelvic trauma;    TECHNIQUE:  1.25 mm axial images were obtained through the pelvis from above the iliac crest through the upper femoral shafts.    COMPARISON:  Plain hip radiograph 08/13/2020 full    FINDINGS:  There is a minimally displaced fracture of the right iliac wing.  There are comminuted fractures involving the anterior pillar of the right acetabulum and the lateral most aspect of the right superior ramus.  There is comminuted and overlapping fracture of the right inferior pubic ramus.  The hips are not dislocated.  The SI joints are intact.  There are degenerative changes seen at the sacroiliac joints and the pubic symphysis.  Bones are osteopenic.  Incidental note is made of vascular calcifications and a small fat containing umbilical hernia.                                       CT Lumbar Spine Without Contrast (Final result)  Result time 08/13/24 20:31:47      Final result by Quiana Chawla MD (08/13/24 20:31:47)                   Impression:      No acute lumbar fracture.   Multilevel degenerative change greatest at L4/L5.    Small bilateral pleural effusions right greater than left.    Gallbladder sludge or gravel-like gallstones.      Electronically signed by: Quiana Chawla  Date:    08/13/2024  Time:    20:31               Narrative:    EXAMINATION:  CT OF THE LUMBAR SPINE WITHOUT    CLINICAL HISTORY:  Complaining of right hip pain secondary to fall from standing.    TECHNIQUE:  1.25 mm axial images were obtained through the lumbar spine. Coronal and sagittal reformatted images were provided.    COMPARISON:  None.    FINDINGS:  There is no lumbar vertebral body fracture.  There is grade 1 anterolisthesis of L4 on L5.  At L3/L4, there is moderate central canal narrowing secondary to the facet arthrosis and ligamentum flavum hypertrophy without significant foraminal stenosis.  At L4/L5, there is no stone significant central canal narrowing, but there is bilateral mild foraminal narrowing.  Secondary to ligamentum flavum hypertrophy and facet arthrosis.  At L5/S1, there is a broad-based disc bulge without any significant central canal stenosis or foraminal stenosis.  There is small marginal osteophytes throughout.  Vacuum phenomena is seen at L4/L5 with mild intervertebral disc space narrowing.  Incidental note is made of small bilateral pleural effusions right greater than left and gallbladder sludge or gravel-like gallstones.                                       X-Ray Chest AP Portable (Final result)  Result time 08/13/24 17:49:32      Final result by Eddie Montilla MD (08/13/24 17:49:32)                   Impression:      1. Interstitial findings may reflect edema versus chronic change, no large focal consolidation.      Electronically signed by: Eddie Montilla MD  Date:    08/13/2024  Time:    17:49               Narrative:    EXAMINATION:  XR CHEST AP PORTABLE    CLINICAL HISTORY:  Chest Pain;    TECHNIQUE:  Single frontal view of the chest was  performed.    COMPARISON:  11/03/2023    FINDINGS:  The cardiomediastinal silhouette is prominent, magnified by technique, similar to the previous exam noting calcification of the aorta..  There is no pleural effusion.  The trachea is midline.  The lungs are symmetrically expanded bilaterally with coarse interstitial attenuation in a central hilar distribution..  No large focal consolidation seen.  There is no pneumothorax.  The osseous structures are remarkable for degenerative changes..                                       X-Ray Ankle Complete Right (Final result)  Result time 08/13/24 17:50:39      Final result by Eddie Montilla MD (08/13/24 17:50:39)                   Impression:      1. Distal tibial and fibular fractures as described.      Electronically signed by: Eddie Montilla MD  Date:    08/13/2024  Time:    17:50               Narrative:    EXAMINATION:  XR ANKLE COMPLETE 3 VIEW RIGHT    CLINICAL HISTORY:  Unspecified fall, initial encounter    TECHNIQUE:  AP, lateral, and oblique images of the right ankle were performed.    COMPARISON:  Radiograph of the foot 06/01/2021    FINDINGS:  Three views right ankle.    There is osteopenia.  There is acute appearing fracture involving the distal aspect of the fibula.  There is comminuted fracture of the medial malleolus.  The ankle mortise is intact.  There is edema about the ankle.  The posterior aspect of the tibia appears intact.  There are degenerative changes of the calcaneus.  There is vascular calcification.                                       X-Ray Hip 2 or 3 views Right with Pelvis when performed (Final result)  Result time 08/13/24 17:53:13      Final result by Eddie Montilla MD (08/13/24 17:53:13)                   Impression:      1. Fractures of the right inferior and superior pubic rami.  2. There is cortical irregularity involving the right iliac wing, concerning for additional fracture.      Electronically signed by: Eddie Montilla  MD  Date:    08/13/2024  Time:    17:53               Narrative:    EXAMINATION:  XR HIP WITH PELVIS WHEN PERFORMED 2 OR 3 VIEWS RIGHT    CLINICAL HISTORY:  Unspecified fall, initial encounter    TECHNIQUE:  AP view of the pelvis and frog leg lateral view of the right hip were performed.    COMPARISON:  None    FINDINGS:  Three views right hip.    The bilateral sacroiliac joints are intact.  The pubic symphysis is intact.  There is osteopenia.  There are fractures of the right superior and inferior pubic rami.  The bilateral femoroacetabular joints are intact noting degenerative change.  There is fracture involving the right iliac wing.                                       X-Ray Knee 1 or 2 View Right (Final result)  Result time 08/13/24 17:53:54   Procedure changed from X-Ray Knee 3 View Right     Final result by Eddie Montilla MD (08/13/24 17:53:54)                   Impression:      1. No acute displaced fracture or dislocation of the knee.      Electronically signed by: Eddie Montilla MD  Date:    08/13/2024  Time:    17:53               Narrative:    EXAMINATION:  XR KNEE 1 OR 2 VIEW RIGHT    CLINICAL HISTORY:  Pain;  Unspecified fall, initial encounter    TECHNIQUE:  AP and lateral views of the right knee were performed.    COMPARISON:  11/20/2014    FINDINGS:  Two views right knee.    There is osteopenia.  There are degenerative changes of the knee.  There is vascular calcification.  No large right knee joint effusion.

## 2024-08-15 NOTE — ASSESSMENT & PLAN NOTE
Creatine stable for now. BMP reviewed- noted Estimated Creatinine Clearance: 26 mL/min (A) (based on SCr of 2.2 mg/dL (H)). according to latest data. Based on current GFR, CKD stage is stage 4 - GFR 15-29.  Monitor UOP and serial BMP and adjust therapy as needed. Renally dose meds. Avoid nephrotoxic medications and procedures.  - Cr 1.9 (near most recent baseline which is 1.6 to 1.8) on admit but 2.2 now so will do light IVF on 8/15  - daily BMP  - avoid nephrotoxic medications

## 2024-08-15 NOTE — PT/OT/SLP EVAL
Physical Therapy Co-Evaluation and Treatment    OT present for coeval due to pt's multiple medical comorbidities and functional/cognition deficits requiring two skilled therapists to appropriately progress pt's musculoskeletal strength, neuromuscular control, and endurance while taking into consideration medical acuity and pt safety.    Patient Name:  Lorena Contreras   MRN:  [id number]    Recommendations:     Discharge Recommendations: High Intensity Therapy   Discharge Equipment Recommendations: wheelchair   Barriers to discharge: None    Assessment:     Lorena Contreras is a 73 y.o. female admitted with a medical diagnosis of Closed right pilon fracture.  She presents with the following impairments/functional limitations: weakness, impaired endurance, gait instability, impaired balance, impaired functional mobility, decreased lower extremity function, decreased safety awareness, decreased coordination, orthopedic precautions, impaired self care skills     Pt receptive and tolerated PT co-eval with OT fairly. Pt educated on NWB: right lower extremity precautions prior to start of treatment with pt verbalizing understanding. Pt needed mod A of 2 persons to complete bed mobility this session. Sit <> stand attempted from EOB with RW but pt only able to achieve ~20% hip clearance, needing max A of 2 persons. Pt unsafe to return home at this time due to increased fall risk. Patient presents with good participation and motivation to return to prior level of function with high intensity therapy.  The patient demonstrates appropriate endurance, pain control, and fine motor control to participate in up to 3 hours or 15hrs of combined therapy post acute.    Rehab Prognosis: Good; patient would benefit from acute skilled PT services to address these deficits and reach maximum level of function.    Recent Surgery: Procedure(s) (LRB):  ORIF, FRACTURE, PILON (Right) 1 Day Post-Op    Plan:     During this hospitalization,  patient to be seen 4 x/week to address the identified rehab impairments via gait training, therapeutic activities, therapeutic exercises, neuromuscular re-education, wheelchair management/training and progress toward the following goals:    Plan of Care Expires:  09/14/24    Subjective     Chief Complaint: R hip pain  Patient/Family Comments/goals: Pt wanted to drink her diet coke  Pain/Comfort:  Pain Rating 1:  (Pt did not rate)  Location - Side 1: Right  Location - Orientation 1: generalized  Location 1: hip  Pain Addressed 1: Reposition, Distraction  Pain Rating Post-Intervention 1: 0/10 (Pt did not rate)  Location - Side 2: Right  Location - Orientation 2: generalized  Location 2: ankle  Pain Rating Post-Intervention 2: 0/10    Patients cultural, spiritual, [employer] conflicts given the current situation: no    Patient History:     Living Environment: Pt lives alone in duplex next to grandson in [facility] with 4-5 NAMAN and Jc HR (wide) with ramp access as well. Bathroom: tub/shower combo   Prior Level of Function: IND with amb and ADLs  DME owned: Bath bench, hospital bed, RW, rollator, SPC, QC, BSC  Caregiver Assistance: family but unable to provide 24 hour assistance    Objective:     Communicated with RN prior to session.  Patient found HOB elevated with peripheral IV  upon PT entry to room.    General Precautions: Standard, fall  Orthopedic Precautions:RLE non weight bearing   Braces:  (RLE soft cast)  Respiratory Status: Room air    Exams:  Gross Motor Coordination:  WFL  Sensation:    -       Impaired  light/touch numbness to R toes  RLE ROM: WFL except ankle not tested due to soft cast  RLE Strength: grossly 3/5 except ankle not tested due to soft cast  LLE ROM: WFL  LLE Strength: grossly 4-/5    Functional Mobility:    Bed Mobility:   Supine > Sit: moderate assistance and of 2 persons  Sit > Supine: moderate assistance and of 2 persons    Transfers:   Sit <> Stand Transfer: maximal assistance and of 2 persons  from EOB using rolling walker   PT assisted keeping RLE off the ground  Pt only able to achieve ~20% hip clearance     Balance:   Sitting balance: FAIR: Cannot move trunk without losing balance  Pt sat EOB with primarly max-mod A with intermittent CGA  Pt sat EOB ~20 mins  Pt demonstrated BUE and BLE tremors when testing extremities while sitting EOB    AM-PAC 6 CLICK MOBILITY  Total Score:10       Treatment & Education:  Pt educated on tip to reduce fall risk and safety with mobility and using call button for assistance from nursing staff with bed mobility.  All questions answered within the scope of PT.  White board updated accordingly.      Patient left HOB elevated with all lines intact, call button in reach, RN notified, and daughter present.    GOALS:   Multidisciplinary Problems       Physical Therapy Goals          Problem: Physical Therapy    Goal Priority Disciplines Outcome Goal Variances Interventions   Physical Therapy Goal     PT, PT/OT Progressing     Description: Goals to be met by: 24     Patient will increase functional independence with mobility by performin. Supine to sit with Stand-by Assistance  2. Sit to stand transfer with Minimal Assistance  3. Bed to chair transfer with Minimal Assistance using Rolling Walker  4. Gait  x 10 feet with Minimal Assistance using Rolling Walker.   5. Wheelchair propulsion x100 feet with Supervision using bilateral upper extremities  6. Stand for 5 minutes with Contact Guard Assistance using Rolling Walker    Patient has a mobility limitation that significantly impairs their ability to participate in one or more mobility related activities of daily living in customary locations in the home. The mobility limitation cannot be sufficiently resolved by the use of a cane or walker. The use of a manual wheelchair will greatly improve the patient's ability to participate in MRADLs. The patient will use the wheelchair on a regular basis at home. They have  expressed their willingness to use a manual wheelchair in the home, and have a caregiver who is available and willing to assist with the wheelchair if needed.                         History:     Past Medical History:   Diagnosis Date    Allergy     Altered mental status 06/19/2022    DYSARTHRIA, SPASTIC MOVEMENTS & DIFFICULTY SWALLOWING    Anemia     Anxiety     Arthritis     Cataract     both removed    Colon polyps     Coronary artery disease     Depression     Diabetes mellitus, type II     Disorder of kidney and ureter     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/2023    Fibromyalgia     Follicular lymphoma     GERD (gastroesophageal reflux disease)     HTN (hypertension)     Hyperlipidemia     MI (myocardial infarction) 03/2019    Personal history of colonic polyps     Restless leg syndrome     Stroke        Past Surgical History:   Procedure Laterality Date    COLONOSCOPY  11/07/2012    Colon polyp found; repeat in 5 years    ELBOW SURGERY Right 2015    dislocation repair     ESOPHAGOGASTRODUODENOSCOPY  11/07/2012    atrophic gastritis, H pylori testing negative    INCISION AND DRAINAGE FOOT Right 6/2/2021    Procedure: INCISION AND DRAINAGE, FOOT, bone biopsy;  Surgeon: Quiana Penn DPM;  Location: [facility] OR;  Service: Podiatry;  Laterality: Right;    KNEE SURGERY Bilateral 2015    scoped    LEFT HEART CATHETERIZATION Left 3/29/2019    Procedure: Left heart cath;  Surgeon: Bladimir Barbosa MD;  Location: [facility] CATH LAB;  Service: Cardiology;  Laterality: Left;    LEFT HEART CATHETERIZATION Left 11/18/2019    Procedure: Left heart cath;  Surgeon: Karl Rico MD;  Location: [facility] CATH LAB;  Service: Cardiology;  Laterality: Left;    LEFT HEART CATHETERIZATION Left 1/8/2020    Procedure: Left heart cath, right radial, noon start;  Surgeon: Christos Monreal MD;  Location: [facility] CATH LAB;  Service: Cardiology;  Laterality: Left;  RN Pre Op 1-6-20.  To be admitted 1-7-20 sor Aspirin Disensitation    OPEN REDUCTION  AND INTERNAL FIXATION (ORIF) OF PILON FRACTURE Right 8/14/2024    Procedure: ORIF, FRACTURE, PILON;  Surgeon: Neto Davalos MD;  Location: [facility] OR [address] [place] [address];  Service: Orthopedics;  Laterality: Right;    TONSILLECTOMY  1955    ULTRASOUND GUIDANCE  1/8/2020    Procedure: Ultrasound Guidance;  Surgeon: Christos Monreal MD;  Location: [facility] CATH LAB;  Service: Cardiology;;       Time Tracking:     PT Received On: 08/15/24  PT Start Time: 1102     PT Stop Time: 1137  PT Total Time (min): 35 min     Billable Minutes: Evaluation 12, Therapeutic Activity 10, and Neuromuscular Re-education 13      08/15/2024

## 2024-08-15 NOTE — ASSESSMENT & PLAN NOTE
Suspect possible from gabapentin as exam with some spotnaneous myoclonic jerking and hyperreflexic on exam and has been intermittent and unclear of timing, has seen neuro in clinic and had parkinsons ruled out she said but no further work up into cause  -hold gabapentin now, as variable cr may have worsened symptoms if related to this and see if improves, may take 48 hours to improve if so

## 2024-08-16 ENCOUNTER — ANESTHESIA (OUTPATIENT)
Dept: SURGERY | Facility: HOSPITAL | Age: 74
DRG: 492 | End: 2024-08-16
Payer: MEDICARE

## 2024-08-16 LAB
ALBUMIN SERPL BCP-MCNC: 2 G/DL (ref 3.5–5.2)
ALP SERPL-CCNC: 164 U/L (ref 55–135)
ALT SERPL W/O P-5'-P-CCNC: 13 U/L (ref 10–44)
ANION GAP SERPL CALC-SCNC: 9 MMOL/L (ref 8–16)
AST SERPL-CCNC: 50 U/L (ref 10–40)
BACTERIA UR CULT: ABNORMAL
BACTERIA UR CULT: ABNORMAL
BASOPHILS # BLD AUTO: 0.03 K/UL (ref 0–0.2)
BASOPHILS # BLD AUTO: 0.03 K/UL (ref 0–0.2)
BASOPHILS NFR BLD: 0.2 % (ref 0–1.9)
BASOPHILS NFR BLD: 0.4 % (ref 0–1.9)
BILIRUB SERPL-MCNC: 0.3 MG/DL (ref 0.1–1)
BUN SERPL-MCNC: 36 MG/DL (ref 8–23)
CALCIUM SERPL-MCNC: 8 MG/DL (ref 8.7–10.5)
CHLORIDE SERPL-SCNC: 102 MMOL/L (ref 95–110)
CO2 SERPL-SCNC: 18 MMOL/L (ref 23–29)
CREAT SERPL-MCNC: 2.4 MG/DL (ref 0.5–1.4)
DIFFERENTIAL METHOD BLD: ABNORMAL
DIFFERENTIAL METHOD BLD: ABNORMAL
EOSINOPHIL # BLD AUTO: 0 K/UL (ref 0–0.5)
EOSINOPHIL # BLD AUTO: 0.2 K/UL (ref 0–0.5)
EOSINOPHIL NFR BLD: 0 % (ref 0–8)
EOSINOPHIL NFR BLD: 2.7 % (ref 0–8)
ERYTHROCYTE [DISTWIDTH] IN BLOOD BY AUTOMATED COUNT: 14.6 % (ref 11.5–14.5)
ERYTHROCYTE [DISTWIDTH] IN BLOOD BY AUTOMATED COUNT: 14.9 % (ref 11.5–14.5)
EST. GFR  (NO RACE VARIABLE): 20.8 ML/MIN/1.73 M^2
GLUCOSE SERPL-MCNC: 136 MG/DL (ref 70–110)
GLUCOSE SERPL-MCNC: 231 MG/DL (ref 70–110)
GLUCOSE SERPL-MCNC: 286 MG/DL (ref 70–110)
GLUCOSE SERPL-MCNC: 304 MG/DL (ref 70–110)
GLUCOSE SERPL-MCNC: 308 MG/DL (ref 70–110)
HCO3 UR-SCNC: 17.9 MMOL/L (ref 24–28)
HCO3 UR-SCNC: 18 MMOL/L (ref 24–28)
HCO3 UR-SCNC: 18.4 MMOL/L (ref 24–28)
HCO3 UR-SCNC: 18.8 MMOL/L (ref 24–28)
HCT VFR BLD AUTO: 26.7 % (ref 37–48.5)
HCT VFR BLD AUTO: 27.7 % (ref 37–48.5)
HCT VFR BLD CALC: 22 %PCV (ref 36–54)
HCT VFR BLD CALC: 23 %PCV (ref 36–54)
HCT VFR BLD CALC: 24 %PCV (ref 36–54)
HCT VFR BLD CALC: 25 %PCV (ref 36–54)
HGB BLD-MCNC: 8.2 G/DL (ref 12–16)
HGB BLD-MCNC: 8.9 G/DL (ref 12–16)
IMM GRANULOCYTES # BLD AUTO: 0.03 K/UL (ref 0–0.04)
IMM GRANULOCYTES # BLD AUTO: 0.1 K/UL (ref 0–0.04)
IMM GRANULOCYTES NFR BLD AUTO: 0.4 % (ref 0–0.5)
IMM GRANULOCYTES NFR BLD AUTO: 0.6 % (ref 0–0.5)
LYMPHOCYTES # BLD AUTO: 0.6 K/UL (ref 1–4.8)
LYMPHOCYTES # BLD AUTO: 1.5 K/UL (ref 1–4.8)
LYMPHOCYTES NFR BLD: 17.1 % (ref 18–48)
LYMPHOCYTES NFR BLD: 3.8 % (ref 18–48)
MAGNESIUM SERPL-MCNC: 2 MG/DL (ref 1.6–2.6)
MCH RBC QN AUTO: 27 PG (ref 27–31)
MCH RBC QN AUTO: 27.9 PG (ref 27–31)
MCHC RBC AUTO-ENTMCNC: 30.7 G/DL (ref 32–36)
MCHC RBC AUTO-ENTMCNC: 32.1 G/DL (ref 32–36)
MCV RBC AUTO: 87 FL (ref 82–98)
MCV RBC AUTO: 88 FL (ref 82–98)
MONOCYTES # BLD AUTO: 0.9 K/UL (ref 0.3–1)
MONOCYTES # BLD AUTO: 1.2 K/UL (ref 0.3–1)
MONOCYTES NFR BLD: 10.3 % (ref 4–15)
MONOCYTES NFR BLD: 7 % (ref 4–15)
NEUTROPHILS # BLD AUTO: 14.5 K/UL (ref 1.8–7.7)
NEUTROPHILS # BLD AUTO: 5.9 K/UL (ref 1.8–7.7)
NEUTROPHILS NFR BLD: 69.1 % (ref 38–73)
NEUTROPHILS NFR BLD: 88.4 % (ref 38–73)
NRBC BLD-RTO: 0 /100 WBC
NRBC BLD-RTO: 0 /100 WBC
PCO2 BLDA: 38.5 MMHG (ref 35–45)
PCO2 BLDA: 39.5 MMHG (ref 35–45)
PCO2 BLDA: 39.7 MMHG (ref 35–45)
PCO2 BLDA: 40 MMHG (ref 35–45)
PH SMN: 7.26 [PH] (ref 7.35–7.45)
PH SMN: 7.27 [PH] (ref 7.35–7.45)
PH SMN: 7.28 [PH] (ref 7.35–7.45)
PH SMN: 7.29 [PH] (ref 7.35–7.45)
PLATELET # BLD AUTO: 130 K/UL (ref 150–450)
PLATELET # BLD AUTO: 178 K/UL (ref 150–450)
PMV BLD AUTO: 13.3 FL (ref 9.2–12.9)
PMV BLD AUTO: 13.5 FL (ref 9.2–12.9)
PO2 BLDA: 136 MMHG (ref 80–100)
PO2 BLDA: 136 MMHG (ref 80–100)
PO2 BLDA: 156 MMHG (ref 80–100)
PO2 BLDA: 158 MMHG (ref 80–100)
POC BE: -8 MMOL/L
POC BE: -9 MMOL/L
POC IONIZED CALCIUM: 1.08 MMOL/L (ref 1.06–1.42)
POC IONIZED CALCIUM: 1.09 MMOL/L (ref 1.06–1.42)
POC IONIZED CALCIUM: 1.09 MMOL/L (ref 1.06–1.42)
POC IONIZED CALCIUM: 1.13 MMOL/L (ref 1.06–1.42)
POC SATURATED O2: 99 % (ref 95–100)
POC TCO2: 19 MMOL/L (ref 23–27)
POC TCO2: 19 MMOL/L (ref 23–27)
POC TCO2: 20 MMOL/L (ref 23–27)
POC TCO2: 20 MMOL/L (ref 23–27)
POCT GLUCOSE: 281 MG/DL (ref 70–110)
POCT GLUCOSE: 282 MG/DL (ref 70–110)
POCT GLUCOSE: 317 MG/DL (ref 70–110)
POTASSIUM BLD-SCNC: 4.8 MMOL/L (ref 3.5–5.1)
POTASSIUM BLD-SCNC: 4.9 MMOL/L (ref 3.5–5.1)
POTASSIUM BLD-SCNC: 5.1 MMOL/L (ref 3.5–5.1)
POTASSIUM BLD-SCNC: 5.3 MMOL/L (ref 3.5–5.1)
POTASSIUM SERPL-SCNC: 4.4 MMOL/L (ref 3.5–5.1)
PROT SERPL-MCNC: 5.7 G/DL (ref 6–8.4)
RBC # BLD AUTO: 3.04 M/UL (ref 4–5.4)
RBC # BLD AUTO: 3.19 M/UL (ref 4–5.4)
SAMPLE: ABNORMAL
SODIUM BLD-SCNC: 130 MMOL/L (ref 136–145)
SODIUM BLD-SCNC: 131 MMOL/L (ref 136–145)
SODIUM SERPL-SCNC: 129 MMOL/L (ref 136–145)
WBC # BLD AUTO: 16.38 K/UL (ref 3.9–12.7)
WBC # BLD AUTO: 8.55 K/UL (ref 3.9–12.7)

## 2024-08-16 PROCEDURE — D9220A PRA ANESTHESIA: Mod: HCNC,,, | Performed by: ANESTHESIOLOGY

## 2024-08-16 PROCEDURE — 36000710: Mod: HCNC | Performed by: ORTHOPAEDIC SURGERY

## 2024-08-16 PROCEDURE — 25000003 PHARM REV CODE 250: Mod: HCNC | Performed by: STUDENT IN AN ORGANIZED HEALTH CARE EDUCATION/TRAINING PROGRAM

## 2024-08-16 PROCEDURE — 30233N1 TRANSFUSION OF NONAUTOLOGOUS RED BLOOD CELLS INTO PERIPHERAL VEIN, PERCUTANEOUS APPROACH: ICD-10-PCS | Performed by: HOSPITALIST

## 2024-08-16 PROCEDURE — 21400001 HC TELEMETRY ROOM: Mod: HCNC

## 2024-08-16 PROCEDURE — 71000016 HC POSTOP RECOV ADDL HR: Mod: HCNC | Performed by: ORTHOPAEDIC SURGERY

## 2024-08-16 PROCEDURE — 36000711: Mod: HCNC | Performed by: ORTHOPAEDIC SURGERY

## 2024-08-16 PROCEDURE — 85025 COMPLETE CBC W/AUTO DIFF WBC: CPT | Mod: HCNC

## 2024-08-16 PROCEDURE — 63600175 PHARM REV CODE 636 W HCPCS: Mod: HCNC | Performed by: HOSPITALIST

## 2024-08-16 PROCEDURE — 37000008 HC ANESTHESIA 1ST 15 MINUTES: Mod: HCNC | Performed by: ORTHOPAEDIC SURGERY

## 2024-08-16 PROCEDURE — 25000003 PHARM REV CODE 250: Mod: HCNC | Performed by: HOSPITALIST

## 2024-08-16 PROCEDURE — 25000003 PHARM REV CODE 250: Mod: HCNC

## 2024-08-16 PROCEDURE — 71000015 HC POSTOP RECOV 1ST HR: Mod: HCNC | Performed by: ORTHOPAEDIC SURGERY

## 2024-08-16 PROCEDURE — 0QS204Z REPOSITION RIGHT PELVIC BONE WITH INTERNAL FIXATION DEVICE, OPEN APPROACH: ICD-10-PCS | Performed by: ORTHOPAEDIC SURGERY

## 2024-08-16 PROCEDURE — 63600175 PHARM REV CODE 636 W HCPCS: Mod: JZ,JG,HCNC | Performed by: ORTHOPAEDIC SURGERY

## 2024-08-16 PROCEDURE — 71000033 HC RECOVERY, INTIAL HOUR: Mod: HCNC | Performed by: ORTHOPAEDIC SURGERY

## 2024-08-16 PROCEDURE — 82962 GLUCOSE BLOOD TEST: CPT | Mod: HCNC | Performed by: ORTHOPAEDIC SURGERY

## 2024-08-16 PROCEDURE — 83735 ASSAY OF MAGNESIUM: CPT | Mod: HCNC

## 2024-08-16 PROCEDURE — 63600175 PHARM REV CODE 636 W HCPCS: Mod: HCNC

## 2024-08-16 PROCEDURE — 27201423 OPTIME MED/SURG SUP & DEVICES STERILE SUPPLY: Mod: HCNC | Performed by: ORTHOPAEDIC SURGERY

## 2024-08-16 PROCEDURE — 27201037 HC PRESSURE MONITORING SET UP: Mod: HCNC

## 2024-08-16 PROCEDURE — C1713 ANCHOR/SCREW BN/BN,TIS/BN: HCPCS | Mod: HCNC | Performed by: ORTHOPAEDIC SURGERY

## 2024-08-16 PROCEDURE — C1769 GUIDE WIRE: HCPCS | Mod: HCNC | Performed by: ORTHOPAEDIC SURGERY

## 2024-08-16 PROCEDURE — 36415 COLL VENOUS BLD VENIPUNCTURE: CPT | Mod: HCNC | Performed by: HOSPITALIST

## 2024-08-16 PROCEDURE — 80053 COMPREHEN METABOLIC PANEL: CPT | Mod: HCNC

## 2024-08-16 PROCEDURE — 0QS404Z REPOSITION RIGHT ACETABULUM WITH INTERNAL FIXATION DEVICE, OPEN APPROACH: ICD-10-PCS | Performed by: ORTHOPAEDIC SURGERY

## 2024-08-16 PROCEDURE — 25000003 PHARM REV CODE 250: Mod: HCNC | Performed by: INTERNAL MEDICINE

## 2024-08-16 PROCEDURE — 36415 COLL VENOUS BLD VENIPUNCTURE: CPT | Mod: HCNC

## 2024-08-16 PROCEDURE — P9021 RED BLOOD CELLS UNIT: HCPCS | Mod: HCNC | Performed by: HOSPITALIST

## 2024-08-16 PROCEDURE — 63600175 PHARM REV CODE 636 W HCPCS: Mod: JZ,JG,HCNC | Performed by: NURSE PRACTITIONER

## 2024-08-16 PROCEDURE — 37000009 HC ANESTHESIA EA ADD 15 MINS: Mod: HCNC | Performed by: ORTHOPAEDIC SURGERY

## 2024-08-16 PROCEDURE — 86920 COMPATIBILITY TEST SPIN: CPT | Mod: HCNC | Performed by: HOSPITALIST

## 2024-08-16 PROCEDURE — 85025 COMPLETE CBC W/AUTO DIFF WBC: CPT | Mod: 91,HCNC | Performed by: HOSPITALIST

## 2024-08-16 PROCEDURE — 63600175 PHARM REV CODE 636 W HCPCS: Mod: HCNC | Performed by: INTERNAL MEDICINE

## 2024-08-16 DEVICE — IMPLANTABLE DEVICE: Type: IMPLANTABLE DEVICE | Site: PELVIS | Status: FUNCTIONAL

## 2024-08-16 DEVICE — SCREW CORT SELF TAP 3.5X70MM: Type: IMPLANTABLE DEVICE | Site: PELVIS | Status: FUNCTIONAL

## 2024-08-16 DEVICE — SCREW BONE 3.5X32MM SS: Type: IMPLANTABLE DEVICE | Site: PELVIS | Status: FUNCTIONAL

## 2024-08-16 DEVICE — SCREW CORT SELF TAP 3.5X55MM: Type: IMPLANTABLE DEVICE | Site: PELVIS | Status: FUNCTIONAL

## 2024-08-16 DEVICE — SCREW CORT SELF-TAP 3.5X30MM S: Type: IMPLANTABLE DEVICE | Site: PELVIS | Status: FUNCTIONAL

## 2024-08-16 DEVICE — SCREW CORT SELF TAP 3.5X60MM: Type: IMPLANTABLE DEVICE | Site: PELVIS | Status: FUNCTIONAL

## 2024-08-16 DEVICE — SCREW BONE 3.5X50MM SS: Type: IMPLANTABLE DEVICE | Site: PELVIS | Status: FUNCTIONAL

## 2024-08-16 RX ORDER — DEXAMETHASONE SODIUM PHOSPHATE 4 MG/ML
INJECTION, SOLUTION INTRA-ARTICULAR; INTRALESIONAL; INTRAMUSCULAR; INTRAVENOUS; SOFT TISSUE
Status: DISCONTINUED | OUTPATIENT
Start: 2024-08-16 | End: 2024-08-16

## 2024-08-16 RX ORDER — INSULIN GLARGINE 100 [IU]/ML
8 INJECTION, SOLUTION SUBCUTANEOUS ONCE
Status: COMPLETED | OUTPATIENT
Start: 2024-08-16 | End: 2024-08-16

## 2024-08-16 RX ORDER — OLANZAPINE 10 MG/2ML
2.5 INJECTION, POWDER, FOR SOLUTION INTRAMUSCULAR ONCE AS NEEDED
Status: DISCONTINUED | OUTPATIENT
Start: 2024-08-16 | End: 2024-08-16

## 2024-08-16 RX ORDER — LIDOCAINE HYDROCHLORIDE 20 MG/ML
INJECTION, SOLUTION EPIDURAL; INFILTRATION; INTRACAUDAL; PERINEURAL
Status: DISCONTINUED | OUTPATIENT
Start: 2024-08-16 | End: 2024-08-16

## 2024-08-16 RX ORDER — INSULIN ASPART 100 [IU]/ML
13 INJECTION, SOLUTION INTRAVENOUS; SUBCUTANEOUS
Status: DISCONTINUED | OUTPATIENT
Start: 2024-08-17 | End: 2024-08-16

## 2024-08-16 RX ORDER — SODIUM CHLORIDE 0.9 % (FLUSH) 0.9 %
10 SYRINGE (ML) INJECTION
Status: DISCONTINUED | OUTPATIENT
Start: 2024-08-16 | End: 2024-08-16 | Stop reason: HOSPADM

## 2024-08-16 RX ORDER — ROCURONIUM BROMIDE 10 MG/ML
INJECTION, SOLUTION INTRAVENOUS
Status: DISCONTINUED | OUTPATIENT
Start: 2024-08-16 | End: 2024-08-16

## 2024-08-16 RX ORDER — TRANEXAMIC ACID 100 MG/ML
INJECTION, SOLUTION INTRAVENOUS
Status: DISCONTINUED | OUTPATIENT
Start: 2024-08-16 | End: 2024-08-16

## 2024-08-16 RX ORDER — INSULIN ASPART 100 [IU]/ML
0-10 INJECTION, SOLUTION INTRAVENOUS; SUBCUTANEOUS
Status: DISCONTINUED | OUTPATIENT
Start: 2024-08-16 | End: 2024-08-21 | Stop reason: HOSPADM

## 2024-08-16 RX ORDER — SODIUM CHLORIDE 9 MG/ML
INJECTION, SOLUTION INTRAVENOUS CONTINUOUS
Status: ACTIVE | OUTPATIENT
Start: 2024-08-16 | End: 2024-08-17

## 2024-08-16 RX ORDER — OXYCODONE HYDROCHLORIDE 5 MG/1
5 TABLET ORAL
Status: DISCONTINUED | OUTPATIENT
Start: 2024-08-16 | End: 2024-08-16 | Stop reason: HOSPADM

## 2024-08-16 RX ORDER — CEFAZOLIN SODIUM 1 G/3ML
INJECTION, POWDER, FOR SOLUTION INTRAMUSCULAR; INTRAVENOUS
Status: DISCONTINUED | OUTPATIENT
Start: 2024-08-16 | End: 2024-08-16

## 2024-08-16 RX ORDER — INSULIN ASPART 100 [IU]/ML
16 INJECTION, SOLUTION INTRAVENOUS; SUBCUTANEOUS
Status: DISCONTINUED | OUTPATIENT
Start: 2024-08-17 | End: 2024-08-17

## 2024-08-16 RX ORDER — VANCOMYCIN HYDROCHLORIDE 1 G/20ML
INJECTION, POWDER, LYOPHILIZED, FOR SOLUTION INTRAVENOUS
Status: DISCONTINUED | OUTPATIENT
Start: 2024-08-16 | End: 2024-08-16 | Stop reason: HOSPADM

## 2024-08-16 RX ORDER — DEXMEDETOMIDINE HYDROCHLORIDE 100 UG/ML
INJECTION, SOLUTION INTRAVENOUS
Status: DISCONTINUED | OUTPATIENT
Start: 2024-08-16 | End: 2024-08-16

## 2024-08-16 RX ORDER — TOBRAMYCIN 1.2 G/30ML
INJECTION, POWDER, LYOPHILIZED, FOR SOLUTION INTRAVENOUS
Status: DISCONTINUED | OUTPATIENT
Start: 2024-08-16 | End: 2024-08-16 | Stop reason: HOSPADM

## 2024-08-16 RX ORDER — BUPIVACAINE HYDROCHLORIDE 2.5 MG/ML
INJECTION, SOLUTION EPIDURAL; INFILTRATION; INTRACAUDAL
Status: DISCONTINUED | OUTPATIENT
Start: 2024-08-16 | End: 2024-08-16 | Stop reason: HOSPADM

## 2024-08-16 RX ORDER — HALOPERIDOL 5 MG/ML
0.5 INJECTION INTRAMUSCULAR EVERY 10 MIN PRN
Status: DISCONTINUED | OUTPATIENT
Start: 2024-08-16 | End: 2024-08-16 | Stop reason: HOSPADM

## 2024-08-16 RX ORDER — FENTANYL CITRATE 50 UG/ML
INJECTION, SOLUTION INTRAMUSCULAR; INTRAVENOUS
Status: DISCONTINUED | OUTPATIENT
Start: 2024-08-16 | End: 2024-08-16

## 2024-08-16 RX ORDER — PHENYLEPHRINE HCL IN 0.9% NACL 1 MG/10 ML
SYRINGE (ML) INTRAVENOUS
Status: DISCONTINUED | OUTPATIENT
Start: 2024-08-16 | End: 2024-08-16

## 2024-08-16 RX ORDER — MUPIROCIN 20 MG/G
OINTMENT TOPICAL
Status: DISCONTINUED | OUTPATIENT
Start: 2024-08-16 | End: 2024-08-16 | Stop reason: HOSPADM

## 2024-08-16 RX ORDER — LORAZEPAM 2 MG/ML
0.5 INJECTION INTRAMUSCULAR ONCE AS NEEDED
Status: COMPLETED | OUTPATIENT
Start: 2024-08-16 | End: 2024-08-17

## 2024-08-16 RX ORDER — IBUPROFEN 200 MG
24 TABLET ORAL
Status: DISCONTINUED | OUTPATIENT
Start: 2024-08-16 | End: 2024-08-21 | Stop reason: HOSPADM

## 2024-08-16 RX ORDER — PROPOFOL 10 MG/ML
VIAL (ML) INTRAVENOUS
Status: DISCONTINUED | OUTPATIENT
Start: 2024-08-16 | End: 2024-08-16

## 2024-08-16 RX ORDER — OLANZAPINE 10 MG/2ML
5 INJECTION, POWDER, FOR SOLUTION INTRAMUSCULAR ONCE AS NEEDED
Status: DISCONTINUED | OUTPATIENT
Start: 2024-08-16 | End: 2024-08-16

## 2024-08-16 RX ORDER — INSULIN GLARGINE 100 [IU]/ML
20 INJECTION, SOLUTION SUBCUTANEOUS DAILY
Status: DISCONTINUED | OUTPATIENT
Start: 2024-08-16 | End: 2024-08-16

## 2024-08-16 RX ORDER — GLUCAGON 1 MG
1 KIT INJECTION
Status: DISCONTINUED | OUTPATIENT
Start: 2024-08-16 | End: 2024-08-16 | Stop reason: HOSPADM

## 2024-08-16 RX ORDER — HYDROMORPHONE HYDROCHLORIDE 1 MG/ML
0.2 INJECTION, SOLUTION INTRAMUSCULAR; INTRAVENOUS; SUBCUTANEOUS EVERY 5 MIN PRN
Status: DISCONTINUED | OUTPATIENT
Start: 2024-08-16 | End: 2024-08-16 | Stop reason: HOSPADM

## 2024-08-16 RX ORDER — PHENYLEPHRINE HYDROCHLORIDE 10 MG/ML
INJECTION INTRAVENOUS
Status: DISCONTINUED | OUTPATIENT
Start: 2024-08-16 | End: 2024-08-16

## 2024-08-16 RX ORDER — KETAMINE HCL IN 0.9 % NACL 50 MG/5 ML
SYRINGE (ML) INTRAVENOUS
Status: DISCONTINUED | OUTPATIENT
Start: 2024-08-16 | End: 2024-08-16

## 2024-08-16 RX ORDER — GLUCAGON 1 MG
1 KIT INJECTION
Status: DISCONTINUED | OUTPATIENT
Start: 2024-08-16 | End: 2024-08-21 | Stop reason: HOSPADM

## 2024-08-16 RX ORDER — IBUPROFEN 200 MG
16 TABLET ORAL
Status: DISCONTINUED | OUTPATIENT
Start: 2024-08-16 | End: 2024-08-21 | Stop reason: HOSPADM

## 2024-08-16 RX ORDER — ONDANSETRON HYDROCHLORIDE 2 MG/ML
INJECTION, SOLUTION INTRAVENOUS
Status: DISCONTINUED | OUTPATIENT
Start: 2024-08-16 | End: 2024-08-16

## 2024-08-16 RX ORDER — INSULIN GLARGINE 100 [IU]/ML
25 INJECTION, SOLUTION SUBCUTANEOUS DAILY
Status: DISCONTINUED | OUTPATIENT
Start: 2024-08-17 | End: 2024-08-17

## 2024-08-16 RX ORDER — OLANZAPINE 10 MG/2ML
5 INJECTION, POWDER, FOR SOLUTION INTRAMUSCULAR ONCE AS NEEDED
Status: COMPLETED | OUTPATIENT
Start: 2024-08-16 | End: 2024-08-16

## 2024-08-16 RX ADMIN — OXYCODONE HYDROCHLORIDE 10 MG: 10 TABLET ORAL at 04:08

## 2024-08-16 RX ADMIN — INSULIN ASPART 4 UNITS: 100 INJECTION, SOLUTION INTRAVENOUS; SUBCUTANEOUS at 09:08

## 2024-08-16 RX ADMIN — ROCURONIUM BROMIDE 20 MG: 10 INJECTION, SOLUTION INTRAVENOUS at 09:08

## 2024-08-16 RX ADMIN — Medication 100 MCG: at 08:08

## 2024-08-16 RX ADMIN — TRANEXAMIC ACID 1000 MG: 100 INJECTION, SOLUTION INTRAVENOUS at 08:08

## 2024-08-16 RX ADMIN — FENTANYL CITRATE 25 MCG: 50 INJECTION, SOLUTION INTRAMUSCULAR; INTRAVENOUS at 08:08

## 2024-08-16 RX ADMIN — SODIUM CHLORIDE, SODIUM GLUCONATE, SODIUM ACETATE, POTASSIUM CHLORIDE, MAGNESIUM CHLORIDE, SODIUM PHOSPHATE, DIBASIC, AND POTASSIUM PHOSPHATE: .53; .5; .37; .037; .03; .012; .00082 INJECTION, SOLUTION INTRAVENOUS at 01:08

## 2024-08-16 RX ADMIN — MUPIROCIN: 20 OINTMENT TOPICAL at 06:08

## 2024-08-16 RX ADMIN — SUGAMMADEX 200 MG: 100 INJECTION, SOLUTION INTRAVENOUS at 03:08

## 2024-08-16 RX ADMIN — INSULIN GLARGINE 20 UNITS: 100 INJECTION, SOLUTION SUBCUTANEOUS at 05:08

## 2024-08-16 RX ADMIN — FENTANYL CITRATE 50 MCG: 50 INJECTION, SOLUTION INTRAMUSCULAR; INTRAVENOUS at 07:08

## 2024-08-16 RX ADMIN — PROPOFOL 20 MG: 10 INJECTION, EMULSION INTRAVENOUS at 03:08

## 2024-08-16 RX ADMIN — OXYCODONE HYDROCHLORIDE 10 MG: 10 TABLET ORAL at 10:08

## 2024-08-16 RX ADMIN — METHOCARBAMOL 500 MG: 500 TABLET ORAL at 09:08

## 2024-08-16 RX ADMIN — OLANZAPINE 5 MG: 10 INJECTION, POWDER, FOR SOLUTION INTRAMUSCULAR at 07:08

## 2024-08-16 RX ADMIN — SODIUM CHLORIDE: 9 INJECTION, SOLUTION INTRAVENOUS at 05:08

## 2024-08-16 RX ADMIN — Medication 100 MCG: at 02:08

## 2024-08-16 RX ADMIN — FENTANYL CITRATE 50 MCG: 50 INJECTION, SOLUTION INTRAMUSCULAR; INTRAVENOUS at 11:08

## 2024-08-16 RX ADMIN — ROCURONIUM BROMIDE 30 MG: 10 INJECTION, SOLUTION INTRAVENOUS at 10:08

## 2024-08-16 RX ADMIN — METHOCARBAMOL 500 MG: 500 TABLET ORAL at 04:08

## 2024-08-16 RX ADMIN — DEXAMETHASONE SODIUM PHOSPHATE 4 MG: 4 INJECTION INTRA-ARTICULAR; INTRALESIONAL; INTRAMUSCULAR; INTRAVENOUS; SOFT TISSUE at 08:08

## 2024-08-16 RX ADMIN — Medication 10 MG: at 02:08

## 2024-08-16 RX ADMIN — Medication 100 MCG: at 01:08

## 2024-08-16 RX ADMIN — ROCURONIUM BROMIDE 30 MG: 10 INJECTION, SOLUTION INTRAVENOUS at 11:08

## 2024-08-16 RX ADMIN — ROCURONIUM BROMIDE 20 MG: 10 INJECTION, SOLUTION INTRAVENOUS at 12:08

## 2024-08-16 RX ADMIN — ACETAMINOPHEN 1000 MG: 325 TABLET ORAL at 04:08

## 2024-08-16 RX ADMIN — Medication 15 MG: at 02:08

## 2024-08-16 RX ADMIN — SODIUM BICARBONATE 650 MG TABLET 650 MG: at 09:08

## 2024-08-16 RX ADMIN — SODIUM CHLORIDE, SODIUM GLUCONATE, SODIUM ACETATE, POTASSIUM CHLORIDE, MAGNESIUM CHLORIDE, SODIUM PHOSPHATE, DIBASIC, AND POTASSIUM PHOSPHATE: .53; .5; .37; .037; .03; .012; .00082 INJECTION, SOLUTION INTRAVENOUS at 08:08

## 2024-08-16 RX ADMIN — Medication 6 MG: at 09:08

## 2024-08-16 RX ADMIN — PHENYLEPHRINE HYDROCHLORIDE 100 MCG: 10 INJECTION INTRAVENOUS at 08:08

## 2024-08-16 RX ADMIN — ONDANSETRON 4 MG: 2 INJECTION INTRAMUSCULAR; INTRAVENOUS at 02:08

## 2024-08-16 RX ADMIN — DEXMEDETOMIDINE 16 MCG: 100 INJECTION, SOLUTION, CONCENTRATE INTRAVENOUS at 04:08

## 2024-08-16 RX ADMIN — Medication 50 MCG: at 07:08

## 2024-08-16 RX ADMIN — FENTANYL CITRATE 25 MCG: 50 INJECTION, SOLUTION INTRAMUSCULAR; INTRAVENOUS at 03:08

## 2024-08-16 RX ADMIN — ROCURONIUM BROMIDE 20 MG: 10 INJECTION, SOLUTION INTRAVENOUS at 08:08

## 2024-08-16 RX ADMIN — PHENYLEPHRINE HYDROCHLORIDE 0.3 MCG/KG/MIN: 10 INJECTION INTRAVENOUS at 08:08

## 2024-08-16 RX ADMIN — ACETAMINOPHEN 1000 MG: 325 TABLET ORAL at 09:08

## 2024-08-16 RX ADMIN — SODIUM CHLORIDE: 0.9 INJECTION, SOLUTION INTRAVENOUS at 07:08

## 2024-08-16 RX ADMIN — CEFAZOLIN 2 G: 330 INJECTION, POWDER, FOR SOLUTION INTRAMUSCULAR; INTRAVENOUS at 08:08

## 2024-08-16 RX ADMIN — PHENYLEPHRINE HYDROCHLORIDE 0.2 MCG/KG/MIN: 10 INJECTION INTRAVENOUS at 08:08

## 2024-08-16 RX ADMIN — Medication 25 MG: at 11:08

## 2024-08-16 RX ADMIN — ROCURONIUM BROMIDE 60 MG: 10 INJECTION, SOLUTION INTRAVENOUS at 07:08

## 2024-08-16 RX ADMIN — PROPOFOL 130 MG: 10 INJECTION, EMULSION INTRAVENOUS at 07:08

## 2024-08-16 RX ADMIN — INSULIN GLARGINE 8 UNITS: 100 INJECTION, SOLUTION SUBCUTANEOUS at 11:08

## 2024-08-16 RX ADMIN — CEFAZOLIN 2 G: 330 INJECTION, POWDER, FOR SOLUTION INTRAMUSCULAR; INTRAVENOUS at 12:08

## 2024-08-16 RX ADMIN — LIDOCAINE HYDROCHLORIDE 100 MG: 20 INJECTION, SOLUTION EPIDURAL; INFILTRATION; INTRACAUDAL at 07:08

## 2024-08-16 RX ADMIN — SODIUM CHLORIDE 4 UNITS/HR: 9 INJECTION, SOLUTION INTRAVENOUS at 01:08

## 2024-08-16 RX ADMIN — HEPARIN SODIUM 5000 UNITS: 5000 INJECTION INTRAVENOUS; SUBCUTANEOUS at 09:08

## 2024-08-16 NOTE — ASSESSMENT & PLAN NOTE
Lorena Contreras is a 73 y.o. female with R pilon fracture and R both column acetabular fracture, closed, NVI,    S/p ORIF R pilon 8/14/24    NWB RLE  Heparin 5K TID for DVT PPx  Remove splint, change dressing, and place a well-padded short-leg fiberglass cast prior to hospital discharge.

## 2024-08-16 NOTE — ASSESSMENT & PLAN NOTE
Hard to titrate at baseline and had to have doctors adjust a lot, runs very high at times up to 1100 she said once even.   Titrate 8/15, running higher as didn't get long acting yesterday and adjusted today and will adjsut further. Has some allergies to some insulins. On tresiba at home.  - low dose SSI  - diabetic diet  - POCT checks  Baseline runs very high per dr schmidt notes she sees with endocrine outpatient. Baseline average 253 glucose on dexcom he reports. Dexcom removed for OR. On drip in OR wasn't given llantus this AM though. Will incease dose to giev now in pacu and inc to moderate sliding scale and inc novolog and will likely titrate here as not contolled at home.

## 2024-08-16 NOTE — PROGRESS NOTES
David Mijares - Surgery ([facility])  [facility] Medicine  Progress Note    Patient Name: Lorena Contreras  MRN: [id number]  Patient Class: IP- Inpatient   Admission Date: 8/13/2024  Length of Stay: 2 days  Attending Physician: Rupal Ochoa MD  Primary Care Provider: Jorge Paris MD        Subjective:     Principal Problem:Closed right pilon fracture        HPI:  Lorena Contreras is 72 y/o with PMHx of DM2, Fibromyalgia, HTN, HLD, CVA, MI, and CAD presents to ED via EMS as a [place] transfer for a fall. Pt was walking into grocery store when she twisted her ankle and fell despite attempts to catch herself. Denies LOC or head trauma. Patient fell on her right side. Had immediate pain to her ankle and hip. Pt was unable to ambulate due to pain. Pain is worse in her ankle. States it has subsided s/p pain medications. Pain has been exacerbated when moving. Had an episode of vomiting while splinting ankle but otherwise no further episodes. Denies fever, chills, chest pain, SOB, abdominal pain and urinary symptoms. Has numbness/ tingling to bilateral feet which she states is chronic 2/2 neuropathy.    In the ED: Afebrile, VSS. H&H 10.9 & 33.2. Cr 1.9. Glucose 348. LFTs AST 50, ALT 45. . Urine and drug toxicology screening pending. Chest Xray impression was interstitial findings may reflect edema versus chronic change, no large focal consolidation. Right Ankle Xray impression was distal tibial and fibular fractures as described. Right Hip Xray impression was fractures of the right inferior and superior pubic rami. There is cortical irregularity involving the right iliac wing, concerning for additional fracture. Right Knee Xray showed  no acute displaced fracture or dislocation of the knee. CT Pelvis showed acute traumatic fractures involving the right iliac wing, the anterior pillar of the right acetabulum and the lateral most aspect of the right superior ramus, and the right inferior pubic ramus. No hip  dislocation. CT Lumbar impression was There is no lumbar vertebral body fracture. There is grade 1 anterolisthesis of L4 on L5. At L3/L4, there is moderate central canal narrowing secondary to the facet arthrosis and ligamentum flavum hypertrophy without significant foraminal stenosis. At L4/L5, there is no stone significant central canal narrowing, but there is bilateral mild foraminal narrowing. Secondary to ligamentum flavum hypertrophy and facet arthrosis. At L5/S1, there is a broad-based disc bulge without any significant central canal stenosis or foraminal stenosis. Pain medications given in the ED. Ortho consulted. Admitted to .        Overview/Hospital Course:  No notes on file    Interval history- seen in pacu with nursing at bedside as well as anesthesia and ortho at various times during exam as patient in OR most of day and just able to see around 415 pm with daughters at bedside. She was very anxious post op and getting worked up and reports feels like she's dying and nauseated and feels like she can't swallow and has high anxiety at baseline and had probable panic attack yesterday where was hallucinating and tremoring with improvement with 1/2 mg ativan yesterday. Anesthesia at bedside, and rec prcedex x 1 in pacu to help with symptoms and starting to relax after 10 min of receiving and they report if worsens overnight can consdier the low dose ativan if needed. Has not been completely herself per daughters, did not hav CT head on admit but she is adament she didn't hit her head. Discussed obtainig one now but has not worsened since admit and may disrupt sleep and inc pain now post op if done overnight and transferring to bed so will hold off for now and possibly pursue tomorrow jsut for completeness but low suspicion of anyting given has been here multiple days now [facility] fall, if were to change overnight in menttal status would recommend doing then. Glucose very high but runs baseline high as  baseline glucose of 253 per dexcom measurements per OP endo notes from dr schmidt and not controlled, was on drip briefly in OR per nursing and will stop now and titrate up mesd as not given lantus this AM and willg I've now and increase sliding scale and novolog once eating. She prefers liquid diet now as mildly nausea. Cr higher today and on IVF and contiu overnight. Daughter reports hydrated adequately yesterday.  Can adjust pain meds overniht if needed. Ortho at bedsie at end of exam to update family  Family likely to stay overnight to make sure they can reorient in case she gest high anxiety or agitated.          Review of patient's allergies indicates:   Allergen Reactions    Novolin 70/30 (semi-synthetic) Nausea And Vomiting     Severe vomiting on Relion 70/30    Sulfa (sulfonamide antibiotics) Anaphylaxis    Talwin [pentazocine lactate] Anaphylaxis    Victoza [liraglutide] Nausea And Vomiting    Glipizide Nausea Only    Citric acid     Codeine     Influenza virus vaccines Hives    Iodine and iodide containing products Hives    Levetiracetam Itching    Rituxan [rituximab] Hives    Zoloft [sertraline] Nausea And Vomiting       No current facility-administered medications on file prior to encounter.     Current Outpatient Medications on File Prior to Encounter   Medication Sig    Bacillus coagulans (DIGESTIVE ADVANTAGE IMMUNE ORAL) Take by mouth.    diclofenac sodium (VOLTAREN TOP) Apply topically.    gabapentin (NEURONTIN) 300 MG capsule Take 1 capsule (300 mg total) by mouth 3 (three) times daily.    hydrALAZINE (APRESOLINE) 50 MG tablet Take 1 tablet (50 mg total) by mouth every 8 (eight) hours.    LANTUS SOLOSTAR U-100 INSULIN 100 unit/mL (3 mL) InPn pen Inject 12 Units into the skin every evening.    NOVOLOG FLEXPEN U-100 INSULIN 100 unit/mL (3 mL) InPn pen Inject 20 Units into the skin 3 (three) times daily with meals.    albuterol (PROVENTIL/VENTOLIN HFA) 90 mcg/actuation inhaler Inhale 2 puffs into the  "lungs every 6 (six) hours as needed for Wheezing or Shortness of Breath.    ASCORBIC ACID, VITAMIN C, ORAL Take by mouth once daily.    aspirin 81 MG Chew Take 1 tablet (81 mg total) by mouth once daily.    atorvastatin (LIPITOR) 80 MG tablet Take 1 tablet by mouth in the evening    cholecalciferol, vitamin D3, (VITAMIN D3) 50 mcg (2,000 unit) Cap Take 2 capsules by mouth 2 (two) times daily.    cyanocobalamin (VITAMIN B-12) 1000 MCG tablet Take 100 mcg by mouth once daily.    DEXCOM G7  Misc Use 1  to track blood glucose, ICD10: E11.65    DEXCOM G7 SENSOR Samina Use 1 sensor every 10 days to track blood glucose, ICD10: E11.65, okay with 90 day supply if possible    EPINEPHrine (EPIPEN) 0.3 mg/0.3 mL AtIn Inject 0.3 mLs (0.3 mg total) into the muscle once. for 1 dose    ESOMEPRAZOLE MAGNESIUM ORAL Take by mouth as needed. (Patient not taking: Reported on 5/28/2024)    FLUoxetine 40 MG capsule Take 1 capsule (40 mg total) by mouth once daily.    isosorbide mononitrate (IMDUR) 30 MG 24 hr tablet Take 1 tablet (30 mg total) by mouth once daily.    LIDOcaine (LIDODERM) 5 % Place 1 patch onto the skin once daily. Remove & Discard patch within 12 hours or as directed by MD.    LORazepam (ATIVAN) 1 MG tablet Take 1 tablet (1 mg total) by mouth 2 (two) times daily as needed for Anxiety.    magnesium oxide (MAG-OX) 400 mg tablet Take 400 mg by mouth once daily.    melatonin 10 mg Cap Take 10 mg by mouth nightly.    metoprolol succinate (TOPROL-XL) 25 MG 24 hr tablet Take 1 tablet (25 mg total) by mouth once daily.    multivitamin/iron/folic acid (CENTRUM COMPLETE ORAL) Take by mouth.    OZEMPIC 1 mg/dose (4 mg/3 mL) Inject 1 mg into the skin every 7 days.    pantoprazole (PROTONIX) 40 MG tablet Take 1 tablet (40 mg total) by mouth once daily.    pen needle, diabetic (BD ULTRA-FINE SAGAR PEN NEEDLE) 32 gauge x 5/32" Ndle One pen needle use with insulin pen 4 times a day.  ICD-10: E11.9    semaglutide (OZEMPIC) " 0.25 mg or 0.5 mg (2 mg/3 mL) pen injector Inject 0.5 mg once weekly thereafter    ticagrelor (BRILINTA) 90 mg tablet Take 1 tablet (90 mg total) by mouth 2 (two) times daily.    vitamin E 1000 UNIT capsule Take 1,000 Units by mouth once daily.     Family History       Problem Relation (Age of Onset)    Allergy (severe) Daughter    Cancer Mother, Father    Diabetes Sister    Heart disease Mother    Hypertension Sister    Lung cancer Brother    No Known Problems Daughter          Tobacco Use    Smoking status: Never    Smokeless tobacco: Never   Substance and Sexual Activity    Alcohol use: Not Currently    Drug use: Never    Sexual activity: Not Currently     Partners: Male     Review of Systems   Constitutional:  Positive for activity change. Negative for chills and fever.   HENT:  Negative for congestion, hearing loss, rhinorrhea and sore throat.    Eyes:  Negative for visual disturbance.   Respiratory:  Negative for shortness of breath.    Cardiovascular:  Negative for chest pain and leg swelling.   Gastrointestinal:  Negative for abdominal pain, constipation, diarrhea, nausea and vomiting.   Genitourinary:  Negative for dysuria, frequency and urgency.   Musculoskeletal:  Positive for arthralgias and myalgias.   Neurological:  Positive for numbness (chronic).     Objective:     Vital Signs (Most Recent):  Temp: 98.1 °F (36.7 °C) (08/16/24 1549)  Pulse: 77 (08/16/24 1645)  Resp: 20 (08/16/24 1645)  BP: 135/70 (08/16/24 1645)  SpO2: 96 % (08/16/24 1645) Vital Signs (24h Range):  Temp:  [98.1 °F (36.7 °C)-99.2 °F (37.3 °C)] 98.1 °F (36.7 °C)  Pulse:  [77-90] 77  Resp:  [15-20] 20  SpO2:  [94 %-100 %] 96 %  BP: (111-170)/(53-73) 135/70     Weight: 81.2 kg (179 lb)  Body mass index is 26.43 kg/m².     Physical Exam  Constitutional:       General: She is not in acute distress.     Appearance: Normal appearance. She is not ill-appearing.      Comments: Very anxious in pacu. Fam bedside.   HENT:      Head: Normocephalic  and atraumatic.   Eyes:      Extraocular Movements: Extraocular movements intact.   Cardiovascular:      Rate and Rhythm: Normal rate and regular rhythm.      Heart sounds: Murmur heard.      No friction rub. No gallop.   Pulmonary:      Effort: Pulmonary effort is normal.      Breath sounds: Normal breath sounds. No wheezing, rhonchi or rales.   Abdominal:      General: There is no distension.      Tenderness: There is no abdominal tenderness. There is no guarding.   Musculoskeletal:         General: Tenderness and signs of injury present.      Right lower leg: No edema.      Left lower leg: No edema.      Comments: [place] to right ankle and right LE post op.  TTP over R hip  Sensation diminished to toes 2/2 neuropathy (chronic)   Neurological:      General: No focal deficit present.      Mental Status: She is alert and oriented to person, place, and time.      Comments: Some spontaneous myoclonic type jerks seen in UE during exam. Hyperreflexic to bilateral UE below elbows   Psychiatric:      Comments: Very anxious                Significant Labs: All pertinent labs within the past 24 hours have been reviewed.  BMP:   Recent Labs   Lab 08/16/24  0456   *   *   K 4.4      CO2 18*   BUN 36*   CREATININE 2.4*   CALCIUM 8.0*   MG 2.0     CBC:   Recent Labs   Lab 08/15/24  0457 08/16/24  0456 08/16/24  1005 08/16/24  1250 08/16/24  1415 08/16/24  1522   WBC 8.20 8.55  --   --   --   --    HGB 8.5* 8.2*  --   --   --   --    HCT 27.5* 26.7*   < > 25* 23* 24*   * 130*  --   --   --   --     < > = values in this interval not displayed.       Significant Imaging: I have reviewed all pertinent imaging results/findings within the past 24 hours.    Imaging Results              CT Ankle (Including Hindfoot) Without Contrast Right (Final result)  Result time 08/14/24 04:49:46      Final result by Portillo Oquendo MD (08/14/24 04:49:46)                   Impression:      Near anatomic alignment of  complex mildly displaced distal tibia fracture and mildly displaced distal fibular fracture.    Electronically signed by resident: Hira Patel  Date:    08/14/2024  Time:    03:56    Electronically signed by: Portillo Oquendo MD  Date:    08/14/2024  Time:    04:49               Narrative:    EXAMINATION:  CT ANKLE (INCLUDING HINDFOOT) WITHOUT CONTRAST RIGHT; CT 3D RENDERING WO INDEPENDENT WORKSTATION    CLINICAL HISTORY:  Fracture, ankle;; Fracture, ankle;per order request;    TECHNIQUE:  Axial 0.625-mm images of the right ankle obtained without intravenous contrast.  Data submitted for coronal and sagittal reformats.  3D reconstructed images were acquired by post processing on an independent workstation with concurrent supervision and archived for permanent record. 3D imaging of the right ankle was obtained for further evaluation and operative planning if needed.    COMPARISON:  Ankle radiograph 03/14/2024.    FINDINGS:  Bones are demineralized.  There is a cast in place.  There is mildly displaced comminuted distal tibial fracture and a minimally displaced distal fibular fracture with medial angulation of the fracture fragment.  Intra-articular extension fracture fragments which are mildly displaced involving the distal tibia near anatomic alignment of fracture fragments.  Fractures involve the posterior and medial malleoli.  No dislocation.  There are a few foci of subcutaneous gas overlying the tibial fracture, possibly relating to trauma or reduction procedure though correlation is advised.  Dense calcific tendinosis of the posterior tibial tendon.  No full-thickness injury of the Achilles tendon.  Soft tissue edema about the ankle.  Prominent vascular calcifications noted.                                       CT 3D Rendering WO Independent Workstation (Final result)  Result time 08/14/24 04:49:46      Final result by Portillo Oquendo MD (08/14/24 04:49:46)                   Impression:      Near  anatomic alignment of complex mildly displaced distal tibia fracture and mildly displaced distal fibular fracture.    Electronically signed by resident: Hira Patel  Date:    08/14/2024  Time:    03:56    Electronically signed by: Portillo Oquendo MD  Date:    08/14/2024  Time:    04:49               Narrative:    EXAMINATION:  CT ANKLE (INCLUDING HINDFOOT) WITHOUT CONTRAST RIGHT; CT 3D RENDERING WO INDEPENDENT WORKSTATION    CLINICAL HISTORY:  Fracture, ankle;; Fracture, ankle;per order request;    TECHNIQUE:  Axial 0.625-mm images of the right ankle obtained without intravenous contrast.  Data submitted for coronal and sagittal reformats.  3D reconstructed images were acquired by post processing on an independent workstation with concurrent supervision and archived for permanent record. 3D imaging of the right ankle was obtained for further evaluation and operative planning if needed.    COMPARISON:  Ankle radiograph 03/14/2024.    FINDINGS:  Bones are demineralized.  There is a cast in place.  There is mildly displaced comminuted distal tibial fracture and a minimally displaced distal fibular fracture with medial angulation of the fracture fragment.  Intra-articular extension fracture fragments which are mildly displaced involving the distal tibia near anatomic alignment of fracture fragments.  Fractures involve the posterior and medial malleoli.  No dislocation.  There are a few foci of subcutaneous gas overlying the tibial fracture, possibly relating to trauma or reduction procedure though correlation is advised.  Dense calcific tendinosis of the posterior tibial tendon.  No full-thickness injury of the Achilles tendon.  Soft tissue edema about the ankle.  Prominent vascular calcifications noted.                                       X-Ray Ankle Complete Right (Final result)  Result time 08/14/24 03:44:09      Final result by Portillo Oquendo MD (08/14/24 03:44:09)                   Impression:       Similar alignment of distal tibia and distal fibula fractures post reduction.      Electronically signed by: Portillo Oquendo MD  Date:    08/14/2024  Time:    03:44               Narrative:    EXAMINATION:  XR ANKLE COMPLETE 3 VIEW RIGHT    CLINICAL HISTORY:  Post reduction;    TECHNIQUE:  AP, lateral, and oblique images of the right ankle were performed.    COMPARISON:  08/13/2024.    FINDINGS:  Exam quality is limited by suboptimal positioning and overlapping cast material.  Acute fractures of the distal tibia and distal fibula as seen previously.  Bones are demineralized.  No gross dislocation or significant worsening alignment of fracture fragments.  Soft tissue edema.                                       CT Pelvis Without Contrast (Final result)  Result time 08/13/24 20:58:12      Final result by Quiana Chawla MD (08/13/24 20:58:12)                   Impression:      Acute traumatic fractures involving the right iliac wing, the anterior pillar of the right acetabulum and the lateral most aspect of the right superior ramus, and the right inferior pubic ramus.  No hip dislocation.      Electronically signed by: Quiana Chawla  Date:    08/13/2024  Time:    20:58               Narrative:    EXAMINATION:  CT PELVIS WITHOUT CONTRAST    CLINICAL HISTORY:  Pelvic trauma;    TECHNIQUE:  1.25 mm axial images were obtained through the pelvis from above the iliac crest through the upper femoral shafts.    COMPARISON:  Plain hip radiograph 08/13/2020 full    FINDINGS:  There is a minimally displaced fracture of the right iliac wing.  There are comminuted fractures involving the anterior pillar of the right acetabulum and the lateral most aspect of the right superior ramus.  There is comminuted and overlapping fracture of the right inferior pubic ramus.  The hips are not dislocated.  The SI joints are intact.  There are degenerative changes seen at the sacroiliac joints and the pubic symphysis.  Bones are  osteopenic.  Incidental note is made of vascular calcifications and a small fat containing umbilical hernia.                                       CT Lumbar Spine Without Contrast (Final result)  Result time 08/13/24 20:31:47      Final result by Quiana Chawla MD (08/13/24 20:31:47)                   Impression:      No acute lumbar fracture.  Multilevel degenerative change greatest at L4/L5.    Small bilateral pleural effusions right greater than left.    Gallbladder sludge or gravel-like gallstones.      Electronically signed by: Quiana Chawla  Date:    08/13/2024  Time:    20:31               Narrative:    EXAMINATION:  CT OF THE LUMBAR SPINE WITHOUT    CLINICAL HISTORY:  Complaining of right hip pain secondary to fall from standing.    TECHNIQUE:  1.25 mm axial images were obtained through the lumbar spine. Coronal and sagittal reformatted images were provided.    COMPARISON:  None.    FINDINGS:  There is no lumbar vertebral body fracture.  There is grade 1 anterolisthesis of L4 on L5.  At L3/L4, there is moderate central canal narrowing secondary to the facet arthrosis and ligamentum flavum hypertrophy without significant foraminal stenosis.  At L4/L5, there is no stone significant central canal narrowing, but there is bilateral mild foraminal narrowing.  Secondary to ligamentum flavum hypertrophy and facet arthrosis.  At L5/S1, there is a broad-based disc bulge without any significant central canal stenosis or foraminal stenosis.  There is small marginal osteophytes throughout.  Vacuum phenomena is seen at L4/L5 with mild intervertebral disc space narrowing.  Incidental note is made of small bilateral pleural effusions right greater than left and gallbladder sludge or gravel-like gallstones.                                       X-Ray Chest AP Portable (Final result)  Result time 08/13/24 17:49:32      Final result by Eddie Montilla MD (08/13/24 17:49:32)                   Impression:      1.  Interstitial findings may reflect edema versus chronic change, no large focal consolidation.      Electronically signed by: Eddie Montilla MD  Date:    08/13/2024  Time:    17:49               Narrative:    EXAMINATION:  XR CHEST AP PORTABLE    CLINICAL HISTORY:  Chest Pain;    TECHNIQUE:  Single frontal view of the chest was performed.    COMPARISON:  11/03/2023    FINDINGS:  The cardiomediastinal silhouette is prominent, magnified by technique, similar to the previous exam noting calcification of the aorta..  There is no pleural effusion.  The trachea is midline.  The lungs are symmetrically expanded bilaterally with coarse interstitial attenuation in a central hilar distribution..  No large focal consolidation seen.  There is no pneumothorax.  The osseous structures are remarkable for degenerative changes..                                       X-Ray Ankle Complete Right (Final result)  Result time 08/13/24 17:50:39      Final result by Eddie Montilla MD (08/13/24 17:50:39)                   Impression:      1. Distal tibial and fibular fractures as described.      Electronically signed by: Eddie Montilla MD  Date:    08/13/2024  Time:    17:50               Narrative:    EXAMINATION:  XR ANKLE COMPLETE 3 VIEW RIGHT    CLINICAL HISTORY:  Unspecified fall, initial encounter    TECHNIQUE:  AP, lateral, and oblique images of the right ankle were performed.    COMPARISON:  Radiograph of the foot 06/01/2021    FINDINGS:  Three views right ankle.    There is osteopenia.  There is acute appearing fracture involving the distal aspect of the fibula.  There is comminuted fracture of the medial malleolus.  The ankle mortise is intact.  There is edema about the ankle.  The posterior aspect of the tibia appears intact.  There are degenerative changes of the calcaneus.  There is vascular calcification.                                       X-Ray Hip 2 or 3 views Right with Pelvis when performed (Final result)  Result  time 08/13/24 17:53:13      Final result by Eddie Montilla MD (08/13/24 17:53:13)                   Impression:      1. Fractures of the right inferior and superior pubic rami.  2. There is cortical irregularity involving the right iliac wing, concerning for additional fracture.      Electronically signed by: Eddie Montilla MD  Date:    08/13/2024  Time:    17:53               Narrative:    EXAMINATION:  XR HIP WITH PELVIS WHEN PERFORMED 2 OR 3 VIEWS RIGHT    CLINICAL HISTORY:  Unspecified fall, initial encounter    TECHNIQUE:  AP view of the pelvis and frog leg lateral view of the right hip were performed.    COMPARISON:  None    FINDINGS:  Three views right hip.    The bilateral sacroiliac joints are intact.  The pubic symphysis is intact.  There is osteopenia.  There are fractures of the right superior and inferior pubic rami.  The bilateral femoroacetabular joints are intact noting degenerative change.  There is fracture involving the right iliac wing.                                       X-Ray Knee 1 or 2 View Right (Final result)  Result time 08/13/24 17:53:54   Procedure changed from X-Ray Knee 3 View Right     Final result by Eddie Montilla MD (08/13/24 17:53:54)                   Impression:      1. No acute displaced fracture or dislocation of the knee.      Electronically signed by: Eddie Montilla MD  Date:    08/13/2024  Time:    17:53               Narrative:    EXAMINATION:  XR KNEE 1 OR 2 VIEW RIGHT    CLINICAL HISTORY:  Pain;  Unspecified fall, initial encounter    TECHNIQUE:  AP and lateral views of the right knee were performed.    COMPARISON:  11/20/2014    FINDINGS:  Two views right knee.    There is osteopenia.  There are degenerative changes of the knee.  There is vascular calcification.  No large right knee joint effusion.                                        Assessment/Plan:      Metabolic acidosis  Na bicarb started, chronic from ckd and likely will need long  term      Tremor  Suspect possible from gabapentin as exam with some spotnaneous myoclonic jerking and hyperreflexic on exam and has been intermittent and unclear of timing, has seen neuro in clinic and had parkinsons ruled out she said but no further work up into cause  -hold gabapentin now, as variable cr may have worsened symptoms if related to this and see if improves, may take 48 hours to improve if so  Still present but worse Cr so may take more time to clear gabapentin if from this        Acute blood loss anemia  Anemia is likely due to acute blood loss which was from surgery . Most recent hemoglobin and hematocrit are listed below.  Recent Labs     08/14/24  0220 08/15/24  0457 08/16/24  0456 08/16/24  1005 08/16/24  1250 08/16/24  1415 08/16/24  1522   HGB 9.9* 8.5* 8.2*  --   --   --   --    HCT 30.9* 27.5* 26.7*   < > 25* 23* 24*    < > = values in this interval not displayed.       Plan  - Monitor serial CBC: Daily  - Transfuse PRBC if patient becomes hemodynamically unstable, symptomatic or H/H drops below 7/21.  - Patient has not received any PRBC transfusions to date  - Patient's anemia is currently  downtrend post op  Pale in pacu, will check post op cbc to see if needs tx. Bp high at 170s      Transaminitis  - AST/ ALT mildly elevated  - hold statin  -improving now and normal, can resume statin in 1-2 days    Multiple fractures of pelvis  Closed fracture of right iliac wing  Fracture of acetabulum  - AF, VSS  - Xray Hip showed fractures of the right inferior and superior pubic rami   - CT Pelvis showed acute traumatic fractures involving the right iliac wing, the anterior pillar of the right acetabulum and the lateral most aspect of the right superior ramus, and the right inferior pubic ramus   - Ortho consulted- and OR today with ortho, op note pending. Multimodal plan control, very anxious in pacu with pain, nursing giving meds during exam aroudn 430 pm. Can adjust overnight if needed. NWB to RLE  either way for ankle for 8 weeks minimum  - multimodal pain regimen  - fall precautions  - on heparin for now given short half life, pending decisions    KYA (acute kidney injury)  KYA is likely due to pre-renal azotemia due to intravascular volume depletion. Baseline creatinine is  1.6 to 1.8 . Most recent creatinine and eGFR are listed below.  Recent Labs     08/14/24  0220 08/15/24  0457 08/16/24  0456   CREATININE 1.9* 2.2* 2.4*   EGFRNORACEVR 27.5* 23.1* 20.8*      Plan  - KYA is worsening. Will adjust treatment as follows: contineu ivf, check renal studies  - Avoid nephrotoxins and renally dose meds for GFR listed above  - Monitor urine output, serial BMP, and adjust therapy as needed  -     Chronic diastolic heart failure  - euvolemic on exam  -   - CXR showed interstitial findings may reflect edema versus chronic change, no pleural effusion  - pt is not on a diuretic at this time  - continue to monitor volume status closely  Results for orders placed during the hospital encounter of 11/03/23    Echo    Interpretation Summary    Left Ventricle: The left ventricle is normal in size. Increased wall thickness. Moderate septal thickening. Normal wall motion. There is normal systolic function with a visually estimated ejection fraction of 65 - 70%. Grade I diastolic dysfunction.    Right Ventricle: Normal right ventricular cavity size. Systolic function is normal.    Left Atrium: Left atrium is severely dilated.    Aortic Valve: There is moderate stenosis. Aortic valve area by VTI is 1.02 cm². Aortic valve peak velocity is 2.59 m/s. Mean gradient is 18 mmHg. The dimensionless index is 0.32.    Mitral Valve: There is mild regurgitation.    Tricuspid Valve: There is mild regurgitation.    Pulmonary Artery: The estimated pulmonary artery systolic pressure is 35 mmHg.    IVC/SVC: Normal venous pressure at 3 mmHg.        Stage 3a chronic kidney disease  Creatine stable for now. BMP reviewed- noted Estimated  Creatinine Clearance: 23.8 mL/min (A) (based on SCr of 2.4 mg/dL (H)). according to latest data. Based on current GFR, CKD stage is stage 4 - GFR 15-29.  Monitor UOP and serial BMP and adjust therapy as needed. Renally dose meds. Avoid nephrotoxic medications and procedures.  - Cr 1.9 (near most recent baseline which is 1.6 to 1.8) on admit but 2.2 now so will do light IVF on 8/15 but slightly worse at 2.4 now with bobbi.  - daily BMP  - avoid nephrotoxic medications    Hypomagnesemia  Patient has Abnormal Magnesium: hypomagnesemia. Will continue to monitor electrolytes closely. Will replace the affected electrolytes and repeat labs to be done after interventions completed. The patient's magnesium results have been reviewed and are listed below.  Recent Labs   Lab 08/15/24  0457   MG 2.2        Coronary artery disease of native artery of native heart with stable angina pectoris  Patient with known CAD which is controlled Will continue Statin and monitor for S/Sx of angina/ACS. Continue to monitor on telemetry.   Had stents placed in 2018 to rca and 2020 to 2 areas per dr cervantes note from [facility], had stress in 2022 that was negative for ischemia. She said he wanted to recheck another one recently but insurance did not cover. She said had aspirin allergy testing before it was restarted due to prev intolerance after a stent. Resume asa pending op plans.    Type 2 diabetes mellitus with stage 3 chronic kidney disease, with long-term current use of insulin  Hard to titrate at baseline and had to have doctors adjust a lot, runs very high at times up to 1100 she said once even.   Titrate 8/15, running higher as didn't get long acting yesterday and adjusted today and will adjsut further. Has some allergies to some insulins. On tresiba at home.  - low dose SSI  - diabetic diet  - POCT checks  Baseline runs very high per dr schmidt notes she sees with endocrine outpatient. Baseline average 253 glucose on dexcom he reports.  Dexcom removed for OR. On drip in OR wasn't given llantus this AM though. Will incease dose to giev now in pacu and inc to moderate sliding scale and inc novolog and will likely titrate here as not contolled at home.    Mixed hyperlipidemia  - continue home statin    Primary hypertension  - Chronic, controlled  - continue home imdur and metoprolol    Anxiety  - hx noted  - continue home fluoxetine   High anxiety at baseline per her and daugher, doctors have been hesitant to do acute anxiety meds due to fall risk they said, was not on on admit, high anxiety 8/15, will try ativan x 1 as suspect having possible panic attack after therapy sesion based on nusring description and did better after this  Very anxious in pacu and anesthesia bedside, giving precedex x 1 to relex, if has issues overnight can give ativan again as did well after 1/2 mg yesterday as suspect is going to run high anxiety post op given baseline as well per family and patient. Crafting takes her mind off of it, will try to bring her some activity books to help with this over weekend      VTE Risk Mitigation (From admission, onward)           Ordered     heparin (porcine) injection 5,000 Units  Every 8 hours         08/14/24 1820     Reason for No Pharmacological VTE Prophylaxis  Once        Question:  Reasons:  Answer:  Risk of Bleeding    08/14/24 0120     IP VTE HIGH RISK PATIENT  Once         08/14/24 0120                    Discharge Planning   MIKHAIL: 8/19/2024     Code Status: Full Code   Is the patient medically ready for discharge?:     Reason for patient still in hospital (select all that apply): Patient trending condition  Discharge Plan A: Skilled Nursing Facility                  Rupal Ochoa MD  Department of Hospital Medicine   [facility] - Surgery ([facility])

## 2024-08-16 NOTE — SUBJECTIVE & OBJECTIVE
Principal Problem:Closed right pilon fracture    Principal Orthopedic Problem: same + R both column acetabular fracture    Interval History: Pt seen and examined at bedside. NAEON, VSS, AF. Pain controlled. Denies fevers, chills, chest pain, SOB, N/V/D.   Hemoglobin 8.2.  Type and screen completed.  2 units of blood held.      Review of patient's allergies indicates:   Allergen Reactions    Novolin 70/30 (semi-synthetic) Nausea And Vomiting     Severe vomiting on Relion 70/30    Sulfa (sulfonamide antibiotics) Anaphylaxis    Talwin [pentazocine lactate] Anaphylaxis    Victoza [liraglutide] Nausea And Vomiting    Glipizide Nausea Only    Citric acid     Codeine     Influenza virus vaccines Hives    Iodine and iodide containing products Hives    Levetiracetam Itching    Rituxan [rituximab] Hives    Zoloft [sertraline] Nausea And Vomiting       Current Facility-Administered Medications   Medication    0.9%  NaCl infusion (for blood administration)    acetaminophen tablet 1,000 mg    albuterol-ipratropium 2.5 mg-0.5 mg/3 mL nebulizer solution 3 mL    bisacodyL suppository 10 mg    ceFAZolin 2 g in D5W 50 mL IVPB (MB+)    dextrose 10% bolus 125 mL 125 mL    dextrose 10% bolus 250 mL 250 mL    diphenhydrAMINE capsule 25 mg    FLUoxetine capsule 40 mg    glucagon (human recombinant) injection 1 mg    glucose chewable tablet 16 g    glucose chewable tablet 24 g    heparin (porcine) injection 5,000 Units    insulin aspart U-100 pen 0-5 Units    insulin aspart U-100 pen 10 Units    insulin glargine U-100 (Lantus) pen 14 Units    isosorbide mononitrate 24 hr tablet 30 mg    melatonin tablet 6 mg    methocarbamoL tablet 500 mg    metoprolol succinate (TOPROL-XL) 24 hr tablet 25 mg    morphine injection 2 mg    mupirocin 2 % ointment    ondansetron disintegrating tablet 8 mg    oxyCODONE immediate release tablet 5 mg    oxyCODONE immediate release tablet Tab 10 mg    pantoprazole EC tablet 40 mg    polyethylene glycol packet 17  "g    promethazine tablet 25 mg    sodium bicarbonate tablet 650 mg    sodium chloride 0.9% flush 10 mL    sodium chloride 0.9% flush 10 mL    tranexamic acid (CYKLOKAPRON) 1,000 mg in 0.9% NaCl 100 mL IVPB (MB+)     Objective:     Vital Signs (Most Recent):  Temp: 98.2 °F (36.8 °C) (08/16/24 0506)  Pulse: 87 (08/16/24 0506)  Resp: 18 (08/16/24 0506)  BP: (!) 142/66 (08/16/24 0506)  SpO2: 95 % (08/16/24 0506) Vital Signs (24h Range):  Temp:  [98.1 °F (36.7 °C)-99.2 °F (37.3 °C)] 98.2 °F (36.8 °C)  Pulse:  [81-88] 87  Resp:  [16-20] 18  SpO2:  [93 %-95 %] 95 %  BP: (111-146)/(53-66) 142/66     Weight: 81.6 kg (179 lb 14.3 oz)  Height: 5' 9" (175.3 cm)  Body mass index is 26.57 kg/m².    No intake or output data in the 24 hours ending 08/16/24 0634       Ortho/SPM Exam     Gen: NAD, WDWN  CV: peripherally well perfused  Resp: unlabored respirations, symmetric chest rise  Neck: TM  Neuro: CN 2-12 grossly intact. No FND.    MSK:  RLE:  SILT  Short leg splint in place, c/d/I  WWP, TB palpated  Pain with ROM of hip    Significant Labs: CBC:   Recent Labs   Lab 08/15/24  0457 08/16/24  0456   WBC 8.20 8.55   HGB 8.5* 8.2*   HCT 27.5* 26.7*   * 130*     CMP:   Recent Labs   Lab 08/15/24  0457 08/16/24  0456    129*   K 4.9 4.4    102   CO2 21* 18*   * 136*   BUN 36* 36*   CREATININE 2.2* 2.4*   CALCIUM 8.0* 8.0*   PROT 5.7* 5.7*   ALBUMIN 2.2* 2.0*   BILITOT 0.3 0.3   ALKPHOS 154* 164*   AST 29 50*   ALT 20 13   ANIONGAP 12 9     All pertinent labs within the past 24 hours have been reviewed.    Significant Imaging: I have reviewed all pertinent imaging results/findings.  "

## 2024-08-16 NOTE — TRANSFER OF CARE
"Anesthesia Transfer of Care Note    Patient: Lorena Contreras    Procedure(s) Performed: Procedure(s) (LRB):  ORIF, FRACTURE, ACETABULUM (Right)    Patient location: PACU    Anesthesia Type: general    Transport from OR: Transported from OR on 6-10 L/min O2 by face mask with adequate spontaneous ventilation    Post pain: adequate analgesia    Post assessment: no apparent anesthetic complications    Post vital signs: stable    Level of consciousness: awake    Nausea/Vomiting: no nausea/vomiting    Complications: none    Transfer of care protocol was followed      Last vitals: Visit Vitals  BP (!) 169/73 (BP Location: Right arm, Patient Position: Lying)   Pulse 90   Temp 36.9 °C (98.4 °F) (Temporal)   Resp 16   Ht 5' 9" (1.753 m)   Wt 81.2 kg (179 lb)   SpO2 95%   Breastfeeding No   BMI 26.43 kg/m²     "

## 2024-08-16 NOTE — ASSESSMENT & PLAN NOTE
Closed fracture of right iliac wing  Fracture of acetabulum  - AF, VSS  - Xray Hip showed fractures of the right inferior and superior pubic rami   - CT Pelvis showed acute traumatic fractures involving the right iliac wing, the anterior pillar of the right acetabulum and the lateral most aspect of the right superior ramus, and the right inferior pubic ramus   - Ortho consulted- and OR today with ortho, op note pending. Multimodal plan control, very anxious in pacu with pain, nursing giving meds during exam aroudn 430 pm. Can adjust overnight if needed. NWB to RLE either way for ankle for 8 weeks minimum  - multimodal pain regimen  - fall precautions  - on heparin for now given short half life, pending decisions

## 2024-08-16 NOTE — SUBJECTIVE & OBJECTIVE
Interval history- seen in pacu with nursing at bedside as well as anesthesia and ortho at various times during exam as patient in OR most of day and just able to see around 415 pm with daughters at bedside. She was very anxious post op and getting worked up and reports feels like she's dying and nauseated and feels like she can't swallow and has high anxiety at baseline and had probable panic attack yesterday where was hallucinating and tremoring with improvement with 1/2 mg ativan yesterday. Anesthesia at bedside, and rec prcedex x 1 in pacu to help with symptoms and starting to relax after 10 min of receiving and they report if worsens overnight can consdier the low dose ativan if needed. Has not been completely herself per daughters, did not hav CT head on admit but she is adament she didn't hit her head. Discussed obtainig one now but has not worsened since admit and may disrupt sleep and inc pain now post op if done overnight and transferring to bed so will hold off for now and possibly pursue tomorrow jsut for completeness but low suspicion of anyting given has been here multiple days now [facility], if were to change overnight in menttal status would recommend doing then. Glucose very high but runs baseline high as baseline glucose of 253 per dexcom measurements per OP endo notes from dr schmidt and not controlled, was on drip briefly in OR per nursing and will stop now and titrate up mesd as not given lantus this AM and willg I've now and increase sliding scale and novolog once eating. She prefers liquid diet now as mildly nausea. Cr higher today and on IVF and contiu overnight. Daughter reports hydrated adequately yesterday.  Can adjust pain meds overniht if needed. Ortho at bedsie at end of exam to update family  Family likely to stay overnight to make sure they can reorient in case she gest high anxiety or agitated.          Review of patient's allergies indicates:   Allergen Reactions    Novolin 70/30  (semi-synthetic) Nausea And Vomiting     Severe vomiting on Relion 70/30    Sulfa (sulfonamide antibiotics) Anaphylaxis    Talwin [pentazocine lactate] Anaphylaxis    Victoza [liraglutide] Nausea And Vomiting    Glipizide Nausea Only    Citric acid     Codeine     Influenza virus vaccines Hives    Iodine and iodide containing products Hives    Levetiracetam Itching    Rituxan [rituximab] Hives    Zoloft [sertraline] Nausea And Vomiting       No current facility-administered medications on file prior to encounter.     Current Outpatient Medications on File Prior to Encounter   Medication Sig    Bacillus coagulans (DIGESTIVE ADVANTAGE IMMUNE ORAL) Take by mouth.    diclofenac sodium (VOLTAREN TOP) Apply topically.    gabapentin (NEURONTIN) 300 MG capsule Take 1 capsule (300 mg total) by mouth 3 (three) times daily.    hydrALAZINE (APRESOLINE) 50 MG tablet Take 1 tablet (50 mg total) by mouth every 8 (eight) hours.    LANTUS SOLOSTAR U-100 INSULIN 100 unit/mL (3 mL) InPn pen Inject 12 Units into the skin every evening.    NOVOLOG FLEXPEN U-100 INSULIN 100 unit/mL (3 mL) InPn pen Inject 20 Units into the skin 3 (three) times daily with meals.    albuterol (PROVENTIL/VENTOLIN HFA) 90 mcg/actuation inhaler Inhale 2 puffs into the lungs every 6 (six) hours as needed for Wheezing or Shortness of Breath.    ASCORBIC ACID, VITAMIN C, ORAL Take by mouth once daily.    aspirin 81 MG Chew Take 1 tablet (81 mg total) by mouth once daily.    atorvastatin (LIPITOR) 80 MG tablet Take 1 tablet by mouth in the evening    cholecalciferol, vitamin D3, (VITAMIN D3) 50 mcg (2,000 unit) Cap Take 2 capsules by mouth 2 (two) times daily.    cyanocobalamin (VITAMIN B-12) 1000 MCG tablet Take 100 mcg by mouth once daily.    DEXCOM G7  Misc Use 1  to track blood glucose, ICD10: E11.65    DEXCOM G7 SENSOR Samina Use 1 sensor every 10 days to track blood glucose, ICD10: E11.65, okay with 90 day supply if possible    EPINEPHrine  "(EPIPEN) 0.3 mg/0.3 mL AtIn Inject 0.3 mLs (0.3 mg total) into the muscle once. for 1 dose    ESOMEPRAZOLE MAGNESIUM ORAL Take by mouth as needed. (Patient not taking: Reported on 5/28/2024)    FLUoxetine 40 MG capsule Take 1 capsule (40 mg total) by mouth once daily.    isosorbide mononitrate (IMDUR) 30 MG 24 hr tablet Take 1 tablet (30 mg total) by mouth once daily.    LIDOcaine (LIDODERM) 5 % Place 1 patch onto the skin once daily. Remove & Discard patch within 12 hours or as directed by MD.    LORazepam (ATIVAN) 1 MG tablet Take 1 tablet (1 mg total) by mouth 2 (two) times daily as needed for Anxiety.    magnesium oxide (MAG-OX) 400 mg tablet Take 400 mg by mouth once daily.    melatonin 10 mg Cap Take 10 mg by mouth nightly.    metoprolol succinate (TOPROL-XL) 25 MG 24 hr tablet Take 1 tablet (25 mg total) by mouth once daily.    multivitamin/iron/folic acid (CENTRUM COMPLETE ORAL) Take by mouth.    OZEMPIC 1 mg/dose (4 mg/3 mL) Inject 1 mg into the skin every 7 days.    pantoprazole (PROTONIX) 40 MG tablet Take 1 tablet (40 mg total) by mouth once daily.    pen needle, diabetic (BD ULTRA-FINE SAGAR PEN NEEDLE) 32 gauge x 5/32" Ndle One pen needle use with insulin pen 4 times a day.  ICD-10: E11.9    semaglutide (OZEMPIC) 0.25 mg or 0.5 mg (2 mg/3 mL) pen injector Inject 0.5 mg once weekly thereafter    ticagrelor (BRILINTA) 90 mg tablet Take 1 tablet (90 mg total) by mouth 2 (two) times daily.    vitamin E 1000 UNIT capsule Take 1,000 Units by mouth once daily.     Family History       Problem Relation (Age of Onset)    Allergy (severe) Daughter    Cancer Mother, Father    Diabetes Sister    Heart disease Mother    Hypertension Sister    Lung cancer Brother    No Known Problems Daughter          Tobacco Use    Smoking status: Never    Smokeless tobacco: Never   Substance and Sexual Activity    Alcohol use: Not Currently    Drug use: Never    Sexual activity: Not Currently     Partners: Male     Review of " Systems   Constitutional:  Positive for activity change. Negative for chills and fever.   HENT:  Negative for congestion, hearing loss, rhinorrhea and sore throat.    Eyes:  Negative for visual disturbance.   Respiratory:  Negative for shortness of breath.    Cardiovascular:  Negative for chest pain and leg swelling.   Gastrointestinal:  Negative for abdominal pain, constipation, diarrhea, nausea and vomiting.   Genitourinary:  Negative for dysuria, frequency and urgency.   Musculoskeletal:  Positive for arthralgias and myalgias.   Neurological:  Positive for numbness (chronic).     Objective:     Vital Signs (Most Recent):  Temp: 98.1 °F (36.7 °C) (08/16/24 1549)  Pulse: 77 (08/16/24 1645)  Resp: 20 (08/16/24 1645)  BP: 135/70 (08/16/24 1645)  SpO2: 96 % (08/16/24 1645) Vital Signs (24h Range):  Temp:  [98.1 °F (36.7 °C)-99.2 °F (37.3 °C)] 98.1 °F (36.7 °C)  Pulse:  [77-90] 77  Resp:  [15-20] 20  SpO2:  [94 %-100 %] 96 %  BP: (111-170)/(53-73) 135/70     Weight: 81.2 kg (179 lb)  Body mass index is 26.43 kg/m².     Physical Exam  Constitutional:       General: She is not in acute distress.     Appearance: Normal appearance. She is not ill-appearing.      Comments: Very anxious in pacu. Fam bedside.   HENT:      Head: Normocephalic and atraumatic.   Eyes:      Extraocular Movements: Extraocular movements intact.   Cardiovascular:      Rate and Rhythm: Normal rate and regular rhythm.      Heart sounds: Murmur heard.      No friction rub. No gallop.   Pulmonary:      Effort: Pulmonary effort is normal.      Breath sounds: Normal breath sounds. No wheezing, rhonchi or rales.   Abdominal:      General: There is no distension.      Tenderness: There is no abdominal tenderness. There is no guarding.   Musculoskeletal:         General: Tenderness and signs of injury present.      Right lower leg: No edema.      Left lower leg: No edema.      Comments: [place] to right ankle and right LE post op.  TTP over R hip  Sensation  diminished to toes 2/2 neuropathy (chronic)   Neurological:      General: No focal deficit present.      Mental Status: She is alert and oriented to person, place, and time.      Comments: Some spontaneous myoclonic type jerks seen in UE during exam. Hyperreflexic to bilateral UE below elbows   Psychiatric:      Comments: Very anxious                Significant Labs: All pertinent labs within the past 24 hours have been reviewed.  BMP:   Recent Labs   Lab 08/16/24  0456   *   *   K 4.4      CO2 18*   BUN 36*   CREATININE 2.4*   CALCIUM 8.0*   MG 2.0     CBC:   Recent Labs   Lab 08/15/24  0457 08/16/24  0456 08/16/24  1005 08/16/24  1250 08/16/24  1415 08/16/24  1522   WBC 8.20 8.55  --   --   --   --    HGB 8.5* 8.2*  --   --   --   --    HCT 27.5* 26.7*   < > 25* 23* 24*   * 130*  --   --   --   --     < > = values in this interval not displayed.       Significant Imaging: I have reviewed all pertinent imaging results/findings within the past 24 hours.    Imaging Results              CT Ankle (Including Hindfoot) Without Contrast Right (Final result)  Result time 08/14/24 04:49:46      Final result by Portillo Oquendo MD (08/14/24 04:49:46)                   Impression:      Near anatomic alignment of complex mildly displaced distal tibia fracture and mildly displaced distal fibular fracture.    Electronically signed by resident: Hira Patel  Date:    08/14/2024  Time:    03:56    Electronically signed by: Portillo Oquendo MD  Date:    08/14/2024  Time:    04:49               Narrative:    EXAMINATION:  CT ANKLE (INCLUDING HINDFOOT) WITHOUT CONTRAST RIGHT; CT 3D RENDERING WO INDEPENDENT WORKSTATION    CLINICAL HISTORY:  Fracture, ankle;; Fracture, ankle;per order request;    TECHNIQUE:  Axial 0.625-mm images of the right ankle obtained without intravenous contrast.  Data submitted for coronal and sagittal reformats.  3D reconstructed images were acquired by post processing on an  independent workstation with concurrent supervision and archived for permanent record. 3D imaging of the right ankle was obtained for further evaluation and operative planning if needed.    COMPARISON:  Ankle radiograph 03/14/2024.    FINDINGS:  Bones are demineralized.  There is a cast in place.  There is mildly displaced comminuted distal tibial fracture and a minimally displaced distal fibular fracture with medial angulation of the fracture fragment.  Intra-articular extension fracture fragments which are mildly displaced involving the distal tibia near anatomic alignment of fracture fragments.  Fractures involve the posterior and medial malleoli.  No dislocation.  There are a few foci of subcutaneous gas overlying the tibial fracture, possibly relating to trauma or reduction procedure though correlation is advised.  Dense calcific tendinosis of the posterior tibial tendon.  No full-thickness injury of the Achilles tendon.  Soft tissue edema about the ankle.  Prominent vascular calcifications noted.                                       CT 3D Rendering WO Independent Workstation (Final result)  Result time 08/14/24 04:49:46      Final result by Portillo Oquendo MD (08/14/24 04:49:46)                   Impression:      Near anatomic alignment of complex mildly displaced distal tibia fracture and mildly displaced distal fibular fracture.    Electronically signed by resident: Hira Patel  Date:    08/14/2024  Time:    03:56    Electronically signed by: Portillo Oquendo MD  Date:    08/14/2024  Time:    04:49               Narrative:    EXAMINATION:  CT ANKLE (INCLUDING HINDFOOT) WITHOUT CONTRAST RIGHT; CT 3D RENDERING WO INDEPENDENT WORKSTATION    CLINICAL HISTORY:  Fracture, ankle;; Fracture, ankle;per order request;    TECHNIQUE:  Axial 0.625-mm images of the right ankle obtained without intravenous contrast.  Data submitted for coronal and sagittal reformats.  3D reconstructed images were acquired by post  processing on an independent workstation with concurrent supervision and archived for permanent record. 3D imaging of the right ankle was obtained for further evaluation and operative planning if needed.    COMPARISON:  Ankle radiograph 03/14/2024.    FINDINGS:  Bones are demineralized.  There is a cast in place.  There is mildly displaced comminuted distal tibial fracture and a minimally displaced distal fibular fracture with medial angulation of the fracture fragment.  Intra-articular extension fracture fragments which are mildly displaced involving the distal tibia near anatomic alignment of fracture fragments.  Fractures involve the posterior and medial malleoli.  No dislocation.  There are a few foci of subcutaneous gas overlying the tibial fracture, possibly relating to trauma or reduction procedure though correlation is advised.  Dense calcific tendinosis of the posterior tibial tendon.  No full-thickness injury of the Achilles tendon.  Soft tissue edema about the ankle.  Prominent vascular calcifications noted.                                       X-Ray Ankle Complete Right (Final result)  Result time 08/14/24 03:44:09      Final result by Portillo Oquendo MD (08/14/24 03:44:09)                   Impression:      Similar alignment of distal tibia and distal fibula fractures post reduction.      Electronically signed by: Portillo Oquendo MD  Date:    08/14/2024  Time:    03:44               Narrative:    EXAMINATION:  XR ANKLE COMPLETE 3 VIEW RIGHT    CLINICAL HISTORY:  Post reduction;    TECHNIQUE:  AP, lateral, and oblique images of the right ankle were performed.    COMPARISON:  08/13/2024.    FINDINGS:  Exam quality is limited by suboptimal positioning and overlapping cast material.  Acute fractures of the distal tibia and distal fibula as seen previously.  Bones are demineralized.  No gross dislocation or significant worsening alignment of fracture fragments.  Soft tissue edema.                                        CT Pelvis Without Contrast (Final result)  Result time 08/13/24 20:58:12      Final result by Quiana Chawla MD (08/13/24 20:58:12)                   Impression:      Acute traumatic fractures involving the right iliac wing, the anterior pillar of the right acetabulum and the lateral most aspect of the right superior ramus, and the right inferior pubic ramus.  No hip dislocation.      Electronically signed by: Quiana Chawla  Date:    08/13/2024  Time:    20:58               Narrative:    EXAMINATION:  CT PELVIS WITHOUT CONTRAST    CLINICAL HISTORY:  Pelvic trauma;    TECHNIQUE:  1.25 mm axial images were obtained through the pelvis from above the iliac crest through the upper femoral shafts.    COMPARISON:  Plain hip radiograph 08/13/2020 full    FINDINGS:  There is a minimally displaced fracture of the right iliac wing.  There are comminuted fractures involving the anterior pillar of the right acetabulum and the lateral most aspect of the right superior ramus.  There is comminuted and overlapping fracture of the right inferior pubic ramus.  The hips are not dislocated.  The SI joints are intact.  There are degenerative changes seen at the sacroiliac joints and the pubic symphysis.  Bones are osteopenic.  Incidental note is made of vascular calcifications and a small fat containing umbilical hernia.                                       CT Lumbar Spine Without Contrast (Final result)  Result time 08/13/24 20:31:47      Final result by Quiana Chawla MD (08/13/24 20:31:47)                   Impression:      No acute lumbar fracture.  Multilevel degenerative change greatest at L4/L5.    Small bilateral pleural effusions right greater than left.    Gallbladder sludge or gravel-like gallstones.      Electronically signed by: Quiana Chawla  Date:    08/13/2024  Time:    20:31               Narrative:    EXAMINATION:  CT OF THE LUMBAR SPINE WITHOUT    CLINICAL HISTORY:  Complaining of right  hip pain secondary to fall from standing.    TECHNIQUE:  1.25 mm axial images were obtained through the lumbar spine. Coronal and sagittal reformatted images were provided.    COMPARISON:  None.    FINDINGS:  There is no lumbar vertebral body fracture.  There is grade 1 anterolisthesis of L4 on L5.  At L3/L4, there is moderate central canal narrowing secondary to the facet arthrosis and ligamentum flavum hypertrophy without significant foraminal stenosis.  At L4/L5, there is no stone significant central canal narrowing, but there is bilateral mild foraminal narrowing.  Secondary to ligamentum flavum hypertrophy and facet arthrosis.  At L5/S1, there is a broad-based disc bulge without any significant central canal stenosis or foraminal stenosis.  There is small marginal osteophytes throughout.  Vacuum phenomena is seen at L4/L5 with mild intervertebral disc space narrowing.  Incidental note is made of small bilateral pleural effusions right greater than left and gallbladder sludge or gravel-like gallstones.                                       X-Ray Chest AP Portable (Final result)  Result time 08/13/24 17:49:32      Final result by Eddie Montilla MD (08/13/24 17:49:32)                   Impression:      1. Interstitial findings may reflect edema versus chronic change, no large focal consolidation.      Electronically signed by: Eddie Montilla MD  Date:    08/13/2024  Time:    17:49               Narrative:    EXAMINATION:  XR CHEST AP PORTABLE    CLINICAL HISTORY:  Chest Pain;    TECHNIQUE:  Single frontal view of the chest was performed.    COMPARISON:  11/03/2023    FINDINGS:  The cardiomediastinal silhouette is prominent, magnified by technique, similar to the previous exam noting calcification of the aorta..  There is no pleural effusion.  The trachea is midline.  The lungs are symmetrically expanded bilaterally with coarse interstitial attenuation in a central hilar distribution..  No large focal  consolidation seen.  There is no pneumothorax.  The osseous structures are remarkable for degenerative changes..                                       X-Ray Ankle Complete Right (Final result)  Result time 08/13/24 17:50:39      Final result by Eddie Montilla MD (08/13/24 17:50:39)                   Impression:      1. Distal tibial and fibular fractures as described.      Electronically signed by: Eddie Montilla MD  Date:    08/13/2024  Time:    17:50               Narrative:    EXAMINATION:  XR ANKLE COMPLETE 3 VIEW RIGHT    CLINICAL HISTORY:  Unspecified fall, initial encounter    TECHNIQUE:  AP, lateral, and oblique images of the right ankle were performed.    COMPARISON:  Radiograph of the foot 06/01/2021    FINDINGS:  Three views right ankle.    There is osteopenia.  There is acute appearing fracture involving the distal aspect of the fibula.  There is comminuted fracture of the medial malleolus.  The ankle mortise is intact.  There is edema about the ankle.  The posterior aspect of the tibia appears intact.  There are degenerative changes of the calcaneus.  There is vascular calcification.                                       X-Ray Hip 2 or 3 views Right with Pelvis when performed (Final result)  Result time 08/13/24 17:53:13      Final result by Eddie Montilla MD (08/13/24 17:53:13)                   Impression:      1. Fractures of the right inferior and superior pubic rami.  2. There is cortical irregularity involving the right iliac wing, concerning for additional fracture.      Electronically signed by: Eddie Montilla MD  Date:    08/13/2024  Time:    17:53               Narrative:    EXAMINATION:  XR HIP WITH PELVIS WHEN PERFORMED 2 OR 3 VIEWS RIGHT    CLINICAL HISTORY:  Unspecified fall, initial encounter    TECHNIQUE:  AP view of the pelvis and frog leg lateral view of the right hip were performed.    COMPARISON:  None    FINDINGS:  Three views right hip.    The bilateral sacroiliac  joints are intact.  The pubic symphysis is intact.  There is osteopenia.  There are fractures of the right superior and inferior pubic rami.  The bilateral femoroacetabular joints are intact noting degenerative change.  There is fracture involving the right iliac wing.                                       X-Ray Knee 1 or 2 View Right (Final result)  Result time 08/13/24 17:53:54   Procedure changed from X-Ray Knee 3 View Right     Final result by Eddie Montilla MD (08/13/24 17:53:54)                   Impression:      1. No acute displaced fracture or dislocation of the knee.      Electronically signed by: Eddie Montilla MD  Date:    08/13/2024  Time:    17:53               Narrative:    EXAMINATION:  XR KNEE 1 OR 2 VIEW RIGHT    CLINICAL HISTORY:  Pain;  Unspecified fall, initial encounter    TECHNIQUE:  AP and lateral views of the right knee were performed.    COMPARISON:  11/20/2014    FINDINGS:  Two views right knee.    There is osteopenia.  There are degenerative changes of the knee.  There is vascular calcification.  No large right knee joint effusion.

## 2024-08-16 NOTE — PROGRESS NOTES
David Mijares - Surgery ([facility])  Orthopedics  Progress Note    Patient Name: Lorena Contreras  MRN: [id number]  Admission Date: 8/13/2024  Hospital Length of Stay: 2 days  Attending Provider: Rupal Ochoa MD  Primary Care Provider: Jorge Paris MD  Follow-up For: Procedure(s) (LRB):  ORIF, FRACTURE, ACETABULUM (open) - trios, supine, pelvic bone foam. Elizabeth pelvic plates, retractors, reduction stuff. 8.0 cannulated screws, 5mm schanz pins. Triangle (Right)    Post-Operative Day: Day of Surgery  Subjective:     Principal Problem:Closed right pilon fracture    Principal Orthopedic Problem: same + R both column acetabular fracture    Interval History: Pt seen and examined at bedside. NAEON, VSS, AF. Pain controlled. Denies fevers, chills, chest pain, SOB, N/V/D.   Hemoglobin 8.2.  Type and screen completed.  2 units of blood held.      Review of patient's allergies indicates:   Allergen Reactions    Novolin 70/30 (semi-synthetic) Nausea And Vomiting     Severe vomiting on Relion 70/30    Sulfa (sulfonamide antibiotics) Anaphylaxis    Talwin [pentazocine lactate] Anaphylaxis    Victoza [liraglutide] Nausea And Vomiting    Glipizide Nausea Only    Citric acid     Codeine     Influenza virus vaccines Hives    Iodine and iodide containing products Hives    Levetiracetam Itching    Rituxan [rituximab] Hives    Zoloft [sertraline] Nausea And Vomiting       Current Facility-Administered Medications   Medication    0.9%  NaCl infusion (for blood administration)    acetaminophen tablet 1,000 mg    albuterol-ipratropium 2.5 mg-0.5 mg/3 mL nebulizer solution 3 mL    bisacodyL suppository 10 mg    ceFAZolin 2 g in D5W 50 mL IVPB (MB+)    dextrose 10% bolus 125 mL 125 mL    dextrose 10% bolus 250 mL 250 mL    diphenhydrAMINE capsule 25 mg    FLUoxetine capsule 40 mg    glucagon (human recombinant) injection 1 mg    glucose chewable tablet 16 g    glucose chewable tablet 24 g    heparin (porcine) injection 5,000 Units  "   insulin aspart U-100 pen 0-5 Units    insulin aspart U-100 pen 10 Units    insulin glargine U-100 (Lantus) pen 14 Units    isosorbide mononitrate 24 hr tablet 30 mg    melatonin tablet 6 mg    methocarbamoL tablet 500 mg    metoprolol succinate (TOPROL-XL) 24 hr tablet 25 mg    morphine injection 2 mg    mupirocin 2 % ointment    ondansetron disintegrating tablet 8 mg    oxyCODONE immediate release tablet 5 mg    oxyCODONE immediate release tablet Tab 10 mg    pantoprazole EC tablet 40 mg    polyethylene glycol packet 17 g    promethazine tablet 25 mg    sodium bicarbonate tablet 650 mg    sodium chloride 0.9% flush 10 mL    sodium chloride 0.9% flush 10 mL    tranexamic acid (CYKLOKAPRON) 1,000 mg in 0.9% NaCl 100 mL IVPB (MB+)     Objective:     Vital Signs (Most Recent):  Temp: 98.2 °F (36.8 °C) (08/16/24 0506)  Pulse: 87 (08/16/24 0506)  Resp: 18 (08/16/24 0506)  BP: (!) 142/66 (08/16/24 0506)  SpO2: 95 % (08/16/24 0506) Vital Signs (24h Range):  Temp:  [98.1 °F (36.7 °C)-99.2 °F (37.3 °C)] 98.2 °F (36.8 °C)  Pulse:  [81-88] 87  Resp:  [16-20] 18  SpO2:  [93 %-95 %] 95 %  BP: (111-146)/(53-66) 142/66     Weight: 81.6 kg (179 lb 14.3 oz)  Height: 5' 9" (175.3 cm)  Body mass index is 26.57 kg/m².    No intake or output data in the 24 hours ending 08/16/24 0634       Ortho/SPM Exam     Gen: NAD, WDWN  CV: peripherally well perfused  Resp: unlabored respirations, symmetric chest rise  Neck: TM  Neuro: CN 2-12 grossly intact. No FND.    MSK:  RLE:  SILT  Short leg splint in place, c/d/I  WWP, TB palpated  Pain with ROM of hip    Significant Labs: CBC:   Recent Labs   Lab 08/15/24 0457 08/16/24 0456   WBC 8.20 8.55   HGB 8.5* 8.2*   HCT 27.5* 26.7*   * 130*     CMP:   Recent Labs   Lab 08/15/24  0457 08/16/24  0456    129*   K 4.9 4.4    102   CO2 21* 18*   * 136*   BUN 36* 36*   CREATININE 2.2* 2.4*   CALCIUM 8.0* 8.0*   PROT 5.7* 5.7*   ALBUMIN 2.2* 2.0*   BILITOT 0.3 0.3   ALKPHOS " 154* 164*   AST 29 50*   ALT 20 13   ANIONGAP 12 9     All pertinent labs within the past 24 hours have been reviewed.    Significant Imaging: I have reviewed all pertinent imaging results/findings.  Assessment/Plan:     * Closed right pilon fracture  Lorena Contreras is a 73 y.o. female with R pilon fracture and R both column acetabular fracture, closed, NVI,    S/p ORIF R pilon 8/14/24    NWB RLE  Heparin 5K TID for DVT PPx  Remove splint, change dressing, and place a well-padded short-leg fiberglass cast prior to hospital discharge.          Closed displaced fracture of right acetabulum  - Plan for ORIF right acetabulum today      Risks and complications of surgery including but not limited to the risks of anesthetic complications, infection, wound healing complications, need for further surgeries, non-union, mal-union, hardware failure, pain, bleeding, stiffness, damage to vessels or nerves, DVT, pulmonary embolism, perioperative medical risks (cardiac, pulmonary, renal, neurologic), and death were discussed at length with the patient. No guarantees were made. Patient verbalized their understanding of everything discussed and all questions were answered. The patient elects to proceed with surgery at this time.             Davian Reeves MD  Orthopedics  [facility] - Surgery ([facility])

## 2024-08-16 NOTE — BRIEF OP NOTE
David Mijares - Surgery ([facility])  Brief Operative Note    SUMMARY     Surgery Date: 8/16/2024     Surgeons and Role:     * Neto Davalos MD - Primary     * Davian Reeves MD - Resident - Assisting     * MARISA Marroquin MD - Resident - Assisting     * Melita Sargent MD - Resident - Assisting        Pre-op Diagnosis:  Other closed fracture of right acetabulum, initial encounter [S32.491A]    Post-op Diagnosis:  Post-Op Diagnosis Codes:     * Other closed fracture of right acetabulum, initial encounter [S32.491A]    Procedure(s) (LRB):  ORIF, FRACTURE, ACETABULUM (Right)    Anesthesia: Choice    Implants:  Implant Name Type Inv. Item Serial No.  Lot No. LRB No. Used Action   MPS CURVED R88 PLATES    [employer]  Right 1 Implanted   MPS STRAIGHT PLATES    EMAN[employer]EL [id number]  Right 1 Implanted   SCREW JELLY SELF-TAP 3.5X30MM S - [id number]  SCREW JELLY SELF-TAP 3.5X30MM S  [employer].  Right 1 Implanted   SCREW BONE 3.5X32MM SS - [id number]  SCREW BONE 3.5X32MM SS  [employer].  Right 1 Implanted   SCREW JELLY SELF TAP 3.5X48MM - [id number]  SCREW JELLY SELF TAP 3.5X48MM  [employer].  Right 1 Implanted   SCREW JELLY SELF TAP 3.5X70MM - [id number]  SCREW JELLY SELF TAP 3.5X70MM  [employer].  Right 1 Implanted   SCREW BONE 3.5X50MM SS - [id number]  SCREW BONE 3.5X50MM SS  [employer].  Right 2 Implanted   SCREW JELLY SELF TAP 3.5X55MM - [id number]  SCREW JELLY SELF TAP 3.5X55MM  [employer].  Right 1 Implanted   SCREW JELLY SELF TAP 3.5X60MM - [id number]  SCREW JELLY SELF TAP 3.5X60MM  [employer].  Right 1 Implanted   SCREW JELLY ST HEX 3.5X65MM - [id number]  SCREW JELLY ST HEX 3.5X65MM  [employer].  Right 1 Implanted   3.5MM CORTICAL SCREW, SELF TAPPING    EMAN[employer]EL [id number]  Right 1 Implanted   3.5MM COTICAL SCREW, SELF TAPPING      Right 1 Implanted   SCREW AXSOS JELLY ST 3.5X46MM - [id number]  SCREW AXSOS JELLY ST 3.5X46MM  [employer]  AYAN.  Right 1 Implanted   MPS FLEX PLATES ( ANNEALED)    EMANUEL [id number]  Right 1 Implanted   8.0 MM PARTIALLY THREADED (25MM) SCREW, STERILE    EMAN[employer]EL [id number]  Right 1 Implanted       Operative Findings: See Op note    Estimated Blood Loss: * No values recorded between 8/16/2024  9:07 AM and 8/16/2024  3:47 PM *    Estimated Blood Loss has been documented.         Specimens:   Specimen (24h ago, onward)      None            [id number]

## 2024-08-16 NOTE — ASSESSMENT & PLAN NOTE
Anemia is likely due to acute blood loss which was from surgery . Most recent hemoglobin and hematocrit are listed below.  Recent Labs     08/14/24  0220 08/15/24  0457 08/16/24  0456 08/16/24  1005 08/16/24  1250 08/16/24  1415 08/16/24  1522   HGB 9.9* 8.5* 8.2*  --   --   --   --    HCT 30.9* 27.5* 26.7*   < > 25* 23* 24*    < > = values in this interval not displayed.       Plan  - Monitor serial CBC: Daily  - Transfuse PRBC if patient becomes hemodynamically unstable, symptomatic or H/H drops below 7/21.  - Patient has not received any PRBC transfusions to date  - Patient's anemia is currently  downtrend post op  Pale in pacu, will check post op cbc to see if needs tx. Bp high at 170s

## 2024-08-16 NOTE — ASSESSMENT & PLAN NOTE
Creatine stable for now. BMP reviewed- noted Estimated Creatinine Clearance: 23.8 mL/min (A) (based on SCr of 2.4 mg/dL (H)). according to latest data. Based on current GFR, CKD stage is stage 4 - GFR 15-29.  Monitor UOP and serial BMP and adjust therapy as needed. Renally dose meds. Avoid nephrotoxic medications and procedures.  - Cr 1.9 (near most recent baseline which is 1.6 to 1.8) on admit but 2.2 now so will do light IVF on 8/15 but slightly worse at 2.4 now with bobbi.  - daily BMP  - avoid nephrotoxic medications

## 2024-08-16 NOTE — NURSING TRANSFER
Nursing Transfer Note      Reason patient is being transferred: PACU 23    Transfer To: [facility]    Transfer via bed    Transfer with IV    Transported by Patient Transport    Medicines sent: Insulin Pen and Mupirocin Ointment    Any special needs or follow-up needed: Routine Care    Chart send with patient: Yes    Notified: daughter at the bedside    Patient reassessed at: 8/16/2024 1801 (date, time)

## 2024-08-16 NOTE — ASSESSMENT & PLAN NOTE
- hx noted  - continue home fluoxetine   High anxiety at baseline per her and daugher, doctors have been hesitant to do acute anxiety meds due to fall risk they said, was not on on admit, high anxiety 8/15, will try ativan x 1 as suspect having possible panic attack after therapy sesion based on nusring description and did better after this  Very anxious in pacu and anesthesia bedside, giving precedex x 1 to relex, if has issues overnight can give ativan again as did well after 1/2 mg yesterday as suspect is going to run high anxiety post op given baseline as well per family and patient. Crafting takes her mind off of it, will try to bring her some activity books to help with this over weekend

## 2024-08-16 NOTE — PT/OT/SLP PROGRESS
Physical Therapy      Patient Name:  Lorena Contreras   MRN:  [id number]    Patient not seen today secondary to Off the floor for procedure/surgery (ORIF of Acetabulum) Will need updated PT orders with updated WB status to perform re-evaluation on pt. Will follow-up 8/17/24.    8/16/2024

## 2024-08-16 NOTE — ASSESSMENT & PLAN NOTE
Suspect possible from gabapentin as exam with some spotnaneous myoclonic jerking and hyperreflexic on exam and has been intermittent and unclear of timing, has seen neuro in clinic and had parkinsons ruled out she said but no further work up into cause  -hold gabapentin now, as variable cr may have worsened symptoms if related to this and see if improves, may take 48 hours to improve if so  Still present but worse Cr so may take more time to clear gabapentin if from this

## 2024-08-16 NOTE — PT/OT/SLP PROGRESS
Occupational Therapy      Patient Name:  Lorena Contreras   MRN:  [id number]    Patient not seen today secondary to Off the floor for procedure/surgery and Will need updated OT orders c/ updated WB status for Re-eval. Will follow-up 8/17.    8/16/2024

## 2024-08-16 NOTE — ASSESSMENT & PLAN NOTE
KYA is likely due to pre-renal azotemia due to intravascular volume depletion. Baseline creatinine is  1.6 to 1.8 . Most recent creatinine and eGFR are listed below.  Recent Labs     08/14/24  0220 08/15/24  0457 08/16/24  0456   CREATININE 1.9* 2.2* 2.4*   EGFRNORACEVR 27.5* 23.1* 20.8*      Plan  - KYA is worsening. Will adjust treatment as follows: contineu ivf, check renal studies  - Avoid nephrotoxins and renally dose meds for GFR listed above  - Monitor urine output, serial BMP, and adjust therapy as needed  -

## 2024-08-16 NOTE — OP NOTE
OPERATIVE NOTE    DATE OF PROCEDURE:  8.16.24    PREOPERATIVE DIAGNOSIS:   Right acetabulum fracture, both column, closed, displaced, initial encounter  Right ankle pilon fracture  Ground level fall    POSTOPERATIVE DIAGNOSIS:   Right acetabulum fracture, both column, closed, displaced, initial encounter  Right ankle pilon fracture  Ground level fall    PROCEDURE:   Open reduction internal fixation right both column acetabulum fracture, 70717    SURGEON:   Neto Davalos MD    ASSISTANT:    MD Elmer Holt MD Chloe Duval, MD    ANESTHESIA:   General    EBL:    600mL    COMPLICATIONS:  none    IMPLANTS:   [place]  Maritza pelvic plates  10 hole  3.5 mm cortical screw, x6  5 hole  3.5 mm cortical screw, x6  8.0 mm partially threaded cannulated screw, x1    Vancomycin 2g  Tobramycin 2.4g    SPECIMENS:   none    INDICATIONS FOR PROCEDURE:  73-year-old female, diabetes, with history of poor control, prior hemoglobin A1c 13, now 8, CHF, coronary artery disease, prior MI, stroke, fibromyalgia, restless leg  Ground level fall 08/13/2024  Immediate right hip/pelvis pain as well as right ankle pain.    Initially seen at outside hospital ER where a right ankle pilon fracture and a right acetabulum both column fracture were identified.    Transferred to our facility for further care    8.14.24 - ORIF R ankle pilon fx    Attempted PT yesterday, but had significant pain and difficulty w/ mobility.    Right both column acetabular fracture  Discussed this injury and both non operative and operative management options.  Non operative management would likely result in pelvic malunion, but more importantly I have concern that she would have significant pain in the early injury period and have difficulty with mobility.  If this is left to heal in its current position, the articular surface of the hip joint is intact, though it was in a overall malaligned position, but if she can tolerate the early  postoperative mobility issues which will likely last 1-2 months, she could have satisfactory outcome and avoid the risks of surgery.  I suspect that her early mobility will continue to be significantly limited due to the discontinuity of the right hemipelvis.  In that sense I discussed open reduction internal fixation of the acetabular fracture.  This would be performed through an anterior Stoppa type approach as well as lateral window, and other supplementary incisions for screw placement and reduction tools.  Hopefully this can improve the overall alignment of her acetabulum, pelvis, improve the early stability, early pain control, mobility, also improve her overall long-term outcome.      The risks, benefits, and alternatives to surgery were discussed with the patient and/or family.    Specific risks discussed included, but were not limited to: prolonged duration of surgery/anesthesia, intraarticular hardware, malalignment,  damage to nearby structures, including neurovascular structures leading to loss of function or loss of limb, bleeding, need for blood transfusion, pain, stiffness, scarring, numbness, tingling, weakness, compartment syndrome, malunion/nonunion, hardware failure, hardware prominence, infection, need for multiple staged procedures, prolonged antibiotics, iatrogenic fracture, heterotopic ossification, arthritis, a variety of medical complications including but not limited to heart attack, stroke, deep venous thrombosis, pulmonary embolism, prolonged hospitalization, prolonged intubation, and death.   Patient and/or family expressed an understanding and desires to proceed with surgery.   All questions were answered.  No guarantees were implied or stated.  Informed consent was obtained.      OPERATIVE PROCEDURE:  Patient met in the preoperative hold area and the correct site and side of surgery being the right pelvis were marked and verified.  Patient brought back to the operative suite.  General  anesthesia smoothly induced.  Patient transferred over to operative table.  Placed in supine position. Pelvic bone foam placed. All bony prominences were appropriately padded.  Patient received 2 g Ancef for preoperative antibiotics.  The pelvis and right lower extremity was then prepped and draped in normal sterile fashion. 1g TXA given to aid in hemostasis.    Time-out was performed verifying the correct patient, site/side of surgery, surgical consent, radiographs as applicable, preop antibiotics, necessary equipment, anticipated blood loss, length of procedure, postoperative disposition.      We started with fluoroscopic assessment of the both, acetabular fracture.  Anterior column maintained its displacement.  Planned to attempt to reduce this in a minimally invasive manner to see if we could place percutaneous screws.  We used fluoroscopic guidance, made a stab incision overlying the right proximal femur.  We placed a 5 mm Schanz pin under fluoroscopic guidance.  We then attempted to pull lateral/distal traction to improve the alignment of the pelvis, however there was only minimal improvement.  Next we placed another percutaneous Schanz pin this time in the LC 2 corridor utilizing multiple planer fluoroscopic imaging.  Small stab incision made overlying the anterior inferior iliac spine.  Bluntly dissected down to bone, placed a 5 mm Schanz pin under fluoroscopy in LC2 corridor.  We utilized these 2 Schanz pins attempting closed reduction of the fracture, however satisfactory alignment was unable to obtain.      We then turned our attention to open reduction internal fixation of the both column acetabular fracture.  We planned for a stoppa/anterior approach, as well as a lateral window.  Pfannenstiel type incision, transverse, was made the lower abdomen 2 cm proximal to the pubic symphysis.  Skin incised with scalpel.  Hemostasis achieved as needed with electrocautery.  Sharply dissected down through the  subcutaneous tissues to the fascia.  The rectus abdominis was split vertical fashion, midline, we peeled back the bladder and entered the space of Retzius.  Lap was placed followed by malleolable retractor.  We identified the symphysis pubis, peeled back the rectus insertion and worked our way along the superior pubic ramus the patient's right side.  We used a Bovie and a Cohen to peel back the fascia, worked our way to the superior pubic ramus fracture site.  There was some communicating vessels that were identified, and clipped with a medium clip applier.  This allowed us to work farther laterally over the dome of the acetabulum.    We then performed a separate surgical approach along lateral side, opened up the lateral window along the lateral iliac crest.  Skin incised with scalpel.  Hemostasis achieved as needed electrocautery.  Sharply dissected down to the fascia utilizing the plane between the external obliques and the glute muscle.  We then used a Bovie and a Cohen to anterior the pelvis.  The iliacus muscle was peeled back with the Cohen.  A lap was placed for hemostasis.  We identified the pelvic fracture along the iliac wing an anterior column.  There was a nutrient vessel with significant bleeding that was controlled with bone wax.  We are able to place retractors over the pelvic brim.  Utilizing both the lateral window and our anterior approach, we were able to pass a Cohen underneath the iliac vessels.  At this point we had access to the anterior superior pubic ramus fracture, as well as the iliac ring fracture.  We are able to gauge reduction of the these utilizing direct visualization.  We were able to manipulate the fracture utilizing the previously placed Schanz pin.    We then turned our attention to placing our anterior pelvic rim plate.  Appropriately sized pelvic plate was curved appropriately fit the patient's anatomy.  It was slid from anterior to posterior underneath the vessels, and we are  able to fine tune the bending of the plate as needed.  It was held in place with a ball spike pusher.  We placed an anterior screw into the superior pubic ramus.  We then worked through the lateral window placed cortical screw posteriorly.  This sucked the plate down nicely.  We reassessed fracture reduction, hardware placement were satisfied.  This left us with somewhat flexible fixation in order to fine tune the iliac wing component of the fracture.    We then repeated the reduction of the iliac wing, and held this in place utilizing the Schanz pin.  We selected appropriate sized pelvic plate, placed this along the superior aspect of the iliac crest, contoured it appropriately.  We then placed 2 cortical screws on 1 side, and then placed another cortical screw in the far side in compression mode to compress the fracture.  We completed fixation by placing a 2nd cortical screw posteriorly.  All screws had strong purchase.    We then returned to the anterior plate.  We placed another 2 screws along the anterior superior pubic ramus.  Placed a 2nd screw along the pelvic brim through our lateral window.  We left the most posterior hole open to avoid conflict with our planned LC 2 corridor screw which we would placed next.    Next we turned our attention to placement of an LC 2 type screw.  This would connect the posterior pelvis to the to both the central fracture fragment as well as the anterior pelvis.  For remove the previously placed Schanz pin in his corridor, placed a wire under fluoroscopic guidance safely down the LC 2 corridor utilizing multi planer fluoroscopy.  Wire was measured and then the appropriate size 8.0 mm partially-threaded cannulated screw was placed over the wire obtaining strong fixation.    We then returned to our anterior plate, worked the lateral window we placed the final cortical screw, which we had held off placing up to this point due to concern for interfering were thorough LC 2 screw  corridor.  Fluoroscopy confirmed appropriate screw placement and fracture reduction.    Wounds irrigated with saline.  Hemostasis achieved as needed with electrocautery.  2 g vancomycin and 2.4 g tobramycin placed deep.  Fascia closed with 1 Vicryl.  Deep tissue closed with 0 Vicryl.  Subcutaneous tissue closed with 2-0 Vicryl.  Skin closed with 3-0 Monocryl.  Prineo, Aquacel, gauze, Tegaderm dressing applied.    At the conclusion of procedure the patient had soft and compressible compartments, brisk cap refill, palpable DP/PT pulse in the operative extremity.    Prior to being awoken from anesthesia we redressed her right ankle pilon surgical incisions with Xeroform, gauze, cast padding, and placed a short-leg plaster splint with the ankle at neutral dorsiflexion.     Prior to final closure all counts were confirmed to be correct.  Patient tolerated the procedure well without any complications, was awoken from anesthesia, transferred PACU for further recovery.    POSTOPERATIVE PLAN:  73-year-old female, diabetes, neuropathy, prior heart attack and stroke with fibromyalgia, restless leg syndrome and multiple medication allergies  Ground level fall 08/13/2024  Right ankle pilon fracture  Right both column acetabular fracture     08/14/2024 - ORIF right ankle pilon fracture    08/16/2024 - ORIF right both column acetabular fracture    Antibiotics x 24 hr  Eliquis x10 weeks for DVT prophylaxis  Nonweightbearing right lower extremity x 10 weeks postop.     Calcium, vitamin-D  Fragility fracture Clinic referral  Hospitalist comanagement    We will remove splint, change dressing, and place a well-padded short-leg fiberglass cast prior to hospital discharge.  After incision healed, and sutures removed, likely 3 weeks postop, consider placement into a cam boot versus continuing the cast, depending on patient preference, inability to place an remove Cam boot        X-rays at subsequent followups:  Right ankle  Pelvis [place]      Follow-up postop  2-3 weeks - remove cast, remove sutures, consider short-leg cast versus Cam boot, based on patient's preference  6 weeks, 3 months, 6 months, 1 year    =====================  Neto Davalos MD  Orthopaedic Surgery

## 2024-08-16 NOTE — PLAN OF CARE
Problem: Adult Inpatient Plan of Care  Goal: Plan of Care Review  Outcome: Progressing  Goal: Patient-Specific Goal (Individualized)  Outcome: Progressing  Goal: Absence of Hospital-Acquired Illness or Injury  Outcome: Progressing  Goal: Optimal Comfort and Wellbeing  Outcome: Progressing  Goal: Readiness for Transition of Care  Outcome: Progressing     Problem: Infection  Goal: Absence of Infection Signs and Symptoms  Outcome: Progressing     Problem: Diabetes Comorbidity  Goal: Blood Glucose Level Within Targeted Range  Outcome: Progressing     Problem: Skin Injury Risk Increased  Goal: Skin Health and Integrity  Outcome: Progressing     Problem: Wound  Goal: Optimal Coping  Outcome: Progressing  Goal: Optimal Functional Ability  Outcome: Progressing  Goal: Absence of Infection Signs and Symptoms  Outcome: Progressing  Goal: Improved Oral Intake  Outcome: Progressing  Goal: Optimal Pain Control and Function  Outcome: Progressing  Goal: Skin Health and Integrity  Outcome: Progressing  Goal: Optimal Wound Healing  Outcome: Progressing

## 2024-08-16 NOTE — PLAN OF CARE
Pre-op assessment completed. Patient verbalized understanding of plan of care. Call bell within reach. Family contact updated.

## 2024-08-16 NOTE — ANESTHESIA PROCEDURE NOTES
Intubation    Date/Time: 8/16/2024 7:50 AM    Performed by: Jules Hair DO  Authorized by: Roseanna Muniz MD    Intubation:     Induction:  Intravenous    Intubated:  Postinduction    Mask Ventilation:  Easy mask    Attempts:  1    Attempted By:  Resident anesthesiologist    Method of Intubation:  Video laryngoscopy    Blade:  Flower 3    Laryngeal View Grade: Grade I - full view of cords      Difficult Airway Encountered?: No      Complications:  None    Airway Device:  Oral endotracheal tube    Airway Device Size:  7.0    Style/Cuff Inflation:  Cuffed    Tube secured:  21    Secured at:  The lips    Placement Verified By:  Capnometry    Complicating Factors:  None    Findings Post-Intubation:  Atraumatic/condition of teeth unchanged and BS equal bilateral

## 2024-08-16 NOTE — ASSESSMENT & PLAN NOTE
- Plan for ORIF right acetabulum today      Risks and complications of surgery including but not limited to the risks of anesthetic complications, infection, wound healing complications, need for further surgeries, non-union, mal-union, hardware failure, pain, bleeding, stiffness, damage to vessels or nerves, DVT, pulmonary embolism, perioperative medical risks (cardiac, pulmonary, renal, neurologic), and death were discussed at length with the patient. No guarantees were made. Patient verbalized their understanding of everything discussed and all questions were answered. The patient elects to proceed with surgery at this time.

## 2024-08-17 PROBLEM — E88.09 HYPOALBUMINEMIA: Status: ACTIVE | Noted: 2024-08-17

## 2024-08-17 PROBLEM — E83.51 HYPOCALCEMIA: Status: ACTIVE | Noted: 2024-08-17

## 2024-08-17 LAB
ALBUMIN SERPL BCP-MCNC: 1.3 G/DL (ref 3.5–5.2)
ALP SERPL-CCNC: 107 U/L (ref 55–135)
ALT SERPL W/O P-5'-P-CCNC: <5 U/L (ref 10–44)
ANION GAP SERPL CALC-SCNC: 6 MMOL/L (ref 8–16)
AST SERPL-CCNC: 38 U/L (ref 10–40)
BASOPHILS # BLD AUTO: 0.03 K/UL (ref 0–0.2)
BASOPHILS NFR BLD: 0.3 % (ref 0–1.9)
BILIRUB SERPL-MCNC: 0.4 MG/DL (ref 0.1–1)
BLD PROD TYP BPU: NORMAL
BLD PROD TYP BPU: NORMAL
BLOOD UNIT EXPIRATION DATE: NORMAL
BLOOD UNIT EXPIRATION DATE: NORMAL
BLOOD UNIT TYPE CODE: 5100
BLOOD UNIT TYPE CODE: 5100
BLOOD UNIT TYPE: NORMAL
BLOOD UNIT TYPE: NORMAL
BUN SERPL-MCNC: 39 MG/DL (ref 8–23)
CALCIUM SERPL-MCNC: 6.2 MG/DL (ref 8.7–10.5)
CHLORIDE SERPL-SCNC: 111 MMOL/L (ref 95–110)
CO2 SERPL-SCNC: 15 MMOL/L (ref 23–29)
CODING SYSTEM: NORMAL
CODING SYSTEM: NORMAL
CREAT SERPL-MCNC: 1.7 MG/DL (ref 0.5–1.4)
CROSSMATCH INTERPRETATION: NORMAL
CROSSMATCH INTERPRETATION: NORMAL
DIFFERENTIAL METHOD BLD: ABNORMAL
DISPENSE STATUS: NORMAL
DISPENSE STATUS: NORMAL
EOSINOPHIL # BLD AUTO: 0 K/UL (ref 0–0.5)
EOSINOPHIL NFR BLD: 0.1 % (ref 0–8)
ERYTHROCYTE [DISTWIDTH] IN BLOOD BY AUTOMATED COUNT: 14.7 % (ref 11.5–14.5)
EST. GFR  (NO RACE VARIABLE): 31.5 ML/MIN/1.73 M^2
GLUCOSE SERPL-MCNC: 163 MG/DL (ref 70–110)
HCT VFR BLD AUTO: 20.9 % (ref 37–48.5)
HGB BLD-MCNC: 6.8 G/DL (ref 12–16)
IMM GRANULOCYTES # BLD AUTO: 0.07 K/UL (ref 0–0.04)
IMM GRANULOCYTES NFR BLD AUTO: 0.7 % (ref 0–0.5)
LYMPHOCYTES # BLD AUTO: 1.4 K/UL (ref 1–4.8)
LYMPHOCYTES NFR BLD: 13.1 % (ref 18–48)
MCH RBC QN AUTO: 27.9 PG (ref 27–31)
MCHC RBC AUTO-ENTMCNC: 32.5 G/DL (ref 32–36)
MCV RBC AUTO: 86 FL (ref 82–98)
MONOCYTES # BLD AUTO: 1.1 K/UL (ref 0.3–1)
MONOCYTES NFR BLD: 10.5 % (ref 4–15)
NEUTROPHILS # BLD AUTO: 7.8 K/UL (ref 1.8–7.7)
NEUTROPHILS NFR BLD: 75.3 % (ref 38–73)
NRBC BLD-RTO: 0 /100 WBC
PLATELET # BLD AUTO: 140 K/UL (ref 150–450)
PMV BLD AUTO: 13.6 FL (ref 9.2–12.9)
POCT GLUCOSE: 170 MG/DL (ref 70–110)
POCT GLUCOSE: 193 MG/DL (ref 70–110)
POCT GLUCOSE: 259 MG/DL (ref 70–110)
POCT GLUCOSE: 269 MG/DL (ref 70–110)
POTASSIUM SERPL-SCNC: 3.5 MMOL/L (ref 3.5–5.1)
PROT SERPL-MCNC: 3.9 G/DL (ref 6–8.4)
RBC # BLD AUTO: 2.44 M/UL (ref 4–5.4)
SODIUM SERPL-SCNC: 132 MMOL/L (ref 136–145)
TRANS ERYTHROCYTES VOL PATIENT: NORMAL ML
TRANS ERYTHROCYTES VOL PATIENT: NORMAL ML
WBC # BLD AUTO: 10.36 K/UL (ref 3.9–12.7)

## 2024-08-17 PROCEDURE — 63600175 PHARM REV CODE 636 W HCPCS: Mod: HCNC | Performed by: NURSE PRACTITIONER

## 2024-08-17 PROCEDURE — 36430 TRANSFUSION BLD/BLD COMPNT: CPT | Mod: HCNC

## 2024-08-17 PROCEDURE — 85025 COMPLETE CBC W/AUTO DIFF WBC: CPT | Mod: HCNC | Performed by: HOSPITALIST

## 2024-08-17 PROCEDURE — P9021 RED BLOOD CELLS UNIT: HCPCS | Mod: HCNC | Performed by: HOSPITALIST

## 2024-08-17 PROCEDURE — 21400001 HC TELEMETRY ROOM: Mod: HCNC

## 2024-08-17 PROCEDURE — 86920 COMPATIBILITY TEST SPIN: CPT | Mod: HCNC | Performed by: HOSPITALIST

## 2024-08-17 PROCEDURE — 36415 COLL VENOUS BLD VENIPUNCTURE: CPT | Mod: HCNC | Performed by: HOSPITALIST

## 2024-08-17 PROCEDURE — 63600175 PHARM REV CODE 636 W HCPCS: Mod: HCNC | Performed by: INTERNAL MEDICINE

## 2024-08-17 PROCEDURE — 25000003 PHARM REV CODE 250: Mod: HCNC | Performed by: INTERNAL MEDICINE

## 2024-08-17 PROCEDURE — 25000003 PHARM REV CODE 250: Mod: HCNC | Performed by: HOSPITALIST

## 2024-08-17 PROCEDURE — 25000003 PHARM REV CODE 250: Mod: HCNC

## 2024-08-17 PROCEDURE — 63600175 PHARM REV CODE 636 W HCPCS: Mod: HCNC | Performed by: HOSPITALIST

## 2024-08-17 PROCEDURE — 63600175 PHARM REV CODE 636 W HCPCS: Mod: HCNC

## 2024-08-17 PROCEDURE — 80053 COMPREHEN METABOLIC PANEL: CPT | Mod: HCNC | Performed by: HOSPITALIST

## 2024-08-17 RX ORDER — INSULIN GLARGINE 100 [IU]/ML
11 INJECTION, SOLUTION SUBCUTANEOUS 2 TIMES DAILY
Status: DISCONTINUED | OUTPATIENT
Start: 2024-08-17 | End: 2024-08-17

## 2024-08-17 RX ORDER — INSULIN GLARGINE 100 [IU]/ML
28 INJECTION, SOLUTION SUBCUTANEOUS NIGHTLY
Status: DISCONTINUED | OUTPATIENT
Start: 2024-08-17 | End: 2024-08-20

## 2024-08-17 RX ORDER — HYDROCODONE BITARTRATE AND ACETAMINOPHEN 500; 5 MG/1; MG/1
TABLET ORAL
Status: DISCONTINUED | OUTPATIENT
Start: 2024-08-17 | End: 2024-08-21 | Stop reason: HOSPADM

## 2024-08-17 RX ORDER — CALCIUM CARBONATE 200(500)MG
1000 TABLET,CHEWABLE ORAL DAILY
Status: DISCONTINUED | OUTPATIENT
Start: 2024-08-17 | End: 2024-08-21 | Stop reason: HOSPADM

## 2024-08-17 RX ORDER — DIPHENHYDRAMINE HYDROCHLORIDE 50 MG/ML
25 INJECTION INTRAMUSCULAR; INTRAVENOUS ONCE AS NEEDED
Status: COMPLETED | OUTPATIENT
Start: 2024-08-17 | End: 2024-08-17

## 2024-08-17 RX ORDER — INSULIN ASPART 100 [IU]/ML
13 INJECTION, SOLUTION INTRAVENOUS; SUBCUTANEOUS
Status: DISCONTINUED | OUTPATIENT
Start: 2024-08-17 | End: 2024-08-19

## 2024-08-17 RX ORDER — INSULIN GLARGINE 100 [IU]/ML
22 INJECTION, SOLUTION SUBCUTANEOUS DAILY
Status: DISCONTINUED | OUTPATIENT
Start: 2024-08-17 | End: 2024-08-17

## 2024-08-17 RX ORDER — HYDROXYZINE HYDROCHLORIDE 25 MG/1
25 TABLET, FILM COATED ORAL 3 TIMES DAILY PRN
Status: DISCONTINUED | OUTPATIENT
Start: 2024-08-17 | End: 2024-08-21 | Stop reason: HOSPADM

## 2024-08-17 RX ORDER — INSULIN GLARGINE 100 [IU]/ML
25 INJECTION, SOLUTION SUBCUTANEOUS NIGHTLY
Status: DISCONTINUED | OUTPATIENT
Start: 2024-08-17 | End: 2024-08-17

## 2024-08-17 RX ORDER — LORAZEPAM 0.5 MG/1
0.5 TABLET ORAL ONCE
Status: COMPLETED | OUTPATIENT
Start: 2024-08-17 | End: 2024-08-17

## 2024-08-17 RX ORDER — OLANZAPINE 10 MG/2ML
10 INJECTION, POWDER, FOR SOLUTION INTRAMUSCULAR ONCE AS NEEDED
Status: COMPLETED | OUTPATIENT
Start: 2024-08-17 | End: 2024-08-17

## 2024-08-17 RX ORDER — OLANZAPINE 10 MG/2ML
5 INJECTION, POWDER, FOR SOLUTION INTRAMUSCULAR ONCE
Status: COMPLETED | OUTPATIENT
Start: 2024-08-17 | End: 2024-08-17

## 2024-08-17 RX ADMIN — APIXABAN 2.5 MG: 2.5 TABLET, FILM COATED ORAL at 08:08

## 2024-08-17 RX ADMIN — FLUOXETINE HYDROCHLORIDE 40 MG: 20 CAPSULE ORAL at 09:08

## 2024-08-17 RX ADMIN — METHOCARBAMOL 500 MG: 500 TABLET ORAL at 01:08

## 2024-08-17 RX ADMIN — CEFTRIAXONE 1 G: 1 INJECTION, POWDER, FOR SOLUTION INTRAMUSCULAR; INTRAVENOUS at 01:08

## 2024-08-17 RX ADMIN — OXYCODONE HYDROCHLORIDE 10 MG: 10 TABLET ORAL at 08:08

## 2024-08-17 RX ADMIN — ACETAMINOPHEN 1000 MG: 325 TABLET ORAL at 01:08

## 2024-08-17 RX ADMIN — HEPARIN SODIUM 5000 UNITS: 5000 INJECTION INTRAVENOUS; SUBCUTANEOUS at 06:08

## 2024-08-17 RX ADMIN — LORAZEPAM 0.5 MG: 0.5 TABLET ORAL at 10:08

## 2024-08-17 RX ADMIN — SODIUM BICARBONATE 650 MG TABLET 650 MG: at 10:08

## 2024-08-17 RX ADMIN — VANCOMYCIN HYDROCHLORIDE 1250 MG: 1.25 INJECTION, POWDER, LYOPHILIZED, FOR SOLUTION INTRAVENOUS at 10:08

## 2024-08-17 RX ADMIN — MORPHINE SULFATE 2 MG: 2 INJECTION, SOLUTION INTRAMUSCULAR; INTRAVENOUS at 12:08

## 2024-08-17 RX ADMIN — ACETAMINOPHEN 1000 MG: 325 TABLET ORAL at 09:08

## 2024-08-17 RX ADMIN — DIPHENHYDRAMINE HYDROCHLORIDE 25 MG: 50 INJECTION, SOLUTION INTRAMUSCULAR; INTRAVENOUS at 02:08

## 2024-08-17 RX ADMIN — INSULIN GLARGINE 28 UNITS: 100 INJECTION, SOLUTION SUBCUTANEOUS at 08:08

## 2024-08-17 RX ADMIN — LORAZEPAM 0.5 MG: 2 INJECTION INTRAMUSCULAR; INTRAVENOUS at 02:08

## 2024-08-17 RX ADMIN — CALCIUM CARBONATE (ANTACID) CHEW TAB 500 MG 1000 MG: 500 CHEW TAB at 10:08

## 2024-08-17 RX ADMIN — OLANZAPINE 5 MG: 10 INJECTION, POWDER, FOR SOLUTION INTRAMUSCULAR at 01:08

## 2024-08-17 RX ADMIN — ISOSORBIDE MONONITRATE 30 MG: 30 TABLET, EXTENDED RELEASE ORAL at 09:08

## 2024-08-17 RX ADMIN — METHOCARBAMOL 500 MG: 500 TABLET ORAL at 08:08

## 2024-08-17 RX ADMIN — OLANZAPINE 10 MG: 10 INJECTION, POWDER, FOR SOLUTION INTRAMUSCULAR at 06:08

## 2024-08-17 RX ADMIN — METHOCARBAMOL 500 MG: 500 TABLET ORAL at 10:08

## 2024-08-17 RX ADMIN — ACETAMINOPHEN 1000 MG: 325 TABLET ORAL at 06:08

## 2024-08-17 RX ADMIN — PANTOPRAZOLE SODIUM 40 MG: 40 TABLET, DELAYED RELEASE ORAL at 09:08

## 2024-08-17 RX ADMIN — METHOCARBAMOL 500 MG: 500 TABLET ORAL at 06:08

## 2024-08-17 RX ADMIN — METOPROLOL SUCCINATE 25 MG: 25 TABLET, EXTENDED RELEASE ORAL at 10:08

## 2024-08-17 RX ADMIN — SODIUM BICARBONATE 650 MG TABLET 650 MG: at 08:08

## 2024-08-17 NOTE — PROGRESS NOTES
[facility] - [facility]  Orthopedics  Progress Note    Patient Name: Lorena Contreras  MRN: [id number]  Admission Date: 8/13/2024  Hospital Length of Stay: 3 days  Attending Provider: Rupal Ochoa MD  Primary Care Provider: Jorge Paris MD  Follow-up For: Procedure(s) (LRB):  ORIF, FRACTURE, ACETABULUM (Right)    Post-Operative Day: 1 Day Post-Op  Subjective:     Principal Problem:Closed right pilon fracture    Principal Orthopedic Problem: same + R both column acetabular fracture    Interval History: Pt seen and examined at bedside. Patient agitated overnight requiring restraints. Hemoglobin 6.8 this AM. 1 unit released. Denies fevers, chills, chest pain, SOB, N/V/D. Pain well controlled.       Review of patient's allergies indicates:   Allergen Reactions    Novolin 70/30 (semi-synthetic) Nausea And Vomiting     Severe vomiting on Relion 70/30    Sulfa (sulfonamide antibiotics) Anaphylaxis    Talwin [pentazocine lactate] Anaphylaxis    Victoza [liraglutide] Nausea And Vomiting    Glipizide Nausea Only    Citric acid     Codeine     Influenza virus vaccines Hives    Iodine and iodide containing products Hives    Levetiracetam Itching    Rituxan [rituximab] Hives    Zoloft [sertraline] Nausea And Vomiting       Current Facility-Administered Medications   Medication    0.9%  NaCl infusion (for blood administration)    0.9%  NaCl infusion (for blood administration)    0.9%  NaCl infusion    acetaminophen tablet 1,000 mg    albuterol-ipratropium 2.5 mg-0.5 mg/3 mL nebulizer solution 3 mL    apixaban tablet 2.5 mg    bisacodyL suppository 10 mg    calcium carbonate 200 mg calcium (500 mg) chewable tablet 1,000 mg    dextrose 10% bolus 125 mL 125 mL    dextrose 10% bolus 125 mL 125 mL    dextrose 10% bolus 125 mL 125 mL    dextrose 10% bolus 250 mL 250 mL    dextrose 10% bolus 250 mL 250 mL    dextrose 10% bolus 250 mL 250 mL    diphenhydrAMINE capsule 25 mg    FLUoxetine capsule 40 mg    glucagon (human  "recombinant) injection 1 mg    glucagon (human recombinant) injection 1 mg    glucose chewable tablet 16 g    glucose chewable tablet 16 g    glucose chewable tablet 24 g    glucose chewable tablet 24 g    insulin aspart U-100 pen 0-10 Units    insulin aspart U-100 pen 13 Units    insulin glargine U-100 (Lantus) pen 11 Units    isosorbide mononitrate 24 hr tablet 30 mg    melatonin tablet 6 mg    methocarbamoL tablet 500 mg    metoprolol succinate (TOPROL-XL) 24 hr tablet 25 mg    morphine injection 2 mg    ondansetron disintegrating tablet 8 mg    oxyCODONE immediate release tablet 5 mg    oxyCODONE immediate release tablet Tab 10 mg    pantoprazole EC tablet 40 mg    polyethylene glycol packet 17 g    promethazine tablet 25 mg    sodium bicarbonate tablet 650 mg    sodium chloride 0.9% flush 10 mL     Objective:     Vital Signs (Most Recent):  Temp: 98.1 °F (36.7 °C) (08/17/24 0807)  Pulse: 79 (08/17/24 0807)  Resp: 16 (08/17/24 0807)  BP: (!) 113/49 (08/17/24 0807)  SpO2: 97 % (08/17/24 0807) Vital Signs (24h Range):  Temp:  [98.1 °F (36.7 °C)-99 °F (37.2 °C)] 98.1 °F (36.7 °C)  Pulse:  [76-88] 79  Resp:  [14-20] 16  SpO2:  [94 %-100 %] 97 %  BP: (113-170)/(49-70) 113/49     Weight: 81.2 kg (179 lb 0.2 oz)  Height: 5' 9" (175.3 cm)  Body mass index is 26.44 kg/m².      Intake/Output Summary (Last 24 hours) at 8/17/2024 0817  Last data filed at 8/16/2024 1536  Gross per 24 hour   Intake 3113 ml   Output 975 ml   Net 2138 ml          Ortho/SPM Exam     Gen: NAD, WDWN  CV: peripherally well perfused  Resp: unlabored respirations, symmetric chest rise    MSK:  RLE:  Surgical dressings over right hip and abdomen c/d/I  Posterior slab splint in place  Fires Quad, wiggles toes  SILT  Compartments soft  Brisk cap refill          Significant Labs: CBC:   Recent Labs   Lab 08/16/24  0456 08/16/24  1005 08/16/24  1522 08/16/24  1924 08/17/24  0430   WBC 8.55  --   --  16.38* 10.36   HGB 8.2*  --   --  8.9* 6.8*   HCT 26.7*  "  < > 24* 27.7* 20.9*   *  --   --  178 140*    < > = values in this interval not displayed.     CMP:   Recent Labs   Lab 08/16/24  0456 08/17/24  0430   * 132*   K 4.4 3.5    111*   CO2 18* 15*   * 163*   BUN 36* 39*   CREATININE 2.4* 1.7*   CALCIUM 8.0* 6.2*   PROT 5.7* 3.9*   ALBUMIN 2.0* 1.3*   BILITOT 0.3 0.4   ALKPHOS 164* 107   AST 50* 38   ALT 13 <5*   ANIONGAP 9 6*     All pertinent labs within the past 24 hours have been reviewed.    Significant Imaging: I have reviewed all pertinent imaging results/findings.  Assessment/Plan:     * Closed right pilon fracture  Lorena Contreras is a 73 y.o. female with R pilon fracture and R both column acetabular fracture, closed, NVI,    S/p ORIF R pilon 8/14/24    NWB RLE  Eliquis 2.5 BID x 10 weeks  Remove splint, change dressing, and place a well-padded short-leg fiberglass cast prior to hospital discharge.          Closed displaced fracture of right acetabulum  S/p ORIF R acetabulum 8/16    - NWB RLE x 10 weeks  - Eliquis 2.5 BID x 10 weeks  - leave dressings dry and intact  - Post op ancef  - dc arlette Marroquin MD  Orthopedics  [facility] - Surgery

## 2024-08-17 NOTE — ASSESSMENT & PLAN NOTE
Anemia is likely due to acute blood loss which was from surgery . Most recent hemoglobin and hematocrit are listed below.  Recent Labs     08/16/24  0456 08/16/24  1005 08/16/24  1522 08/16/24  1924 08/17/24  0430   HGB 8.2*  --   --  8.9* 6.8*   HCT 26.7*   < > 24* 27.7* 20.9*    < > = values in this interval not displayed.       Plan  - Monitor serial CBC: Daily  - Transfuse PRBC if patient becomes hemodynamically unstable, symptomatic or H/H drops below 7/21.  - Patient has received 2 units of PRBCs on 8/17  - Patient's anemia is currently  downtrend post op  Expected with large surgery

## 2024-08-17 NOTE — PLAN OF CARE
Problem: Adult Inpatient Plan of Care  Goal: Plan of Care Review  Outcome: Progressing  Goal: Patient-Specific Goal (Individualized)  Outcome: Progressing  Goal: Absence of Hospital-Acquired Illness or Injury  Outcome: Progressing  Goal: Optimal Comfort and Wellbeing  Outcome: Progressing  Goal: Readiness for Transition of Care  Outcome: Progressing     Problem: Infection  Goal: Absence of Infection Signs and Symptoms  Outcome: Progressing     Problem: Diabetes Comorbidity  Goal: Blood Glucose Level Within Targeted Range  Outcome: Progressing     Problem: Skin Injury Risk Increased  Goal: Skin Health and Integrity  Outcome: Progressing     Problem: Wound  Goal: Optimal Coping  Outcome: Progressing  Goal: Optimal Functional Ability  Outcome: Progressing  Goal: Absence of Infection Signs and Symptoms  Outcome: Progressing  Goal: Improved Oral Intake  Outcome: Progressing  Goal: Optimal Pain Control and Function  Outcome: Progressing  Goal: Skin Health and Integrity  Outcome: Progressing  Goal: Optimal Wound Healing  Outcome: Progressing     Problem: Acute Kidney Injury/Impairment  Goal: Fluid and Electrolyte Balance  Outcome: Progressing  Goal: Improved Oral Intake  Outcome: Progressing  Goal: Effective Renal Function  Outcome: Progressing     Problem: Fall Injury Risk  Goal: Absence of Fall and Fall-Related Injury  Outcome: Progressing     Problem: Restraint, Nonviolent  Goal: Absence of Harm or Injury  Outcome: Progressing

## 2024-08-17 NOTE — ASSESSMENT & PLAN NOTE
Lorena Contreras is a 73 y.o. female with R pilon fracture and R both column acetabular fracture, closed, NVI,    S/p ORIF R pilon 8/14/24    МАРИНА LINARES  Eliquis 2.5 BID x 10 weeks  Remove splint, change dressing, and place a well-padded short-leg fiberglass cast prior to hospital discharge.

## 2024-08-17 NOTE — NURSING
"Upon entering patient's room, the IV blood tubing had been disconnected and flowing onto the bed, IV still intact, pt stated that she "needs to get all this blood out of me", attempted to explain to pt that she is being given blood due to her low blood level, pt began to become increasingly agitated and started talking to a man that she stated she is seeing in her room, telling him that he needs to help her "get all this blood out of my arm". IV tubing was reconnected to pt and instructed her to leave it connected, she stated "well I had to cut that tubing to get you to come in here and see me". Dr. Ochoa notified of situation, new orders received, wrist restraints were reapplied to pt, safety measures in place.   "

## 2024-08-17 NOTE — PT/OT/SLP PROGRESS
Occupational Therapy      Patient Name:  Lorena Contreras   MRN:  [id number]    OT attempted this AM. RN hold due to agitation, confusion. OT to attempt at next available date.     8/17/2024

## 2024-08-17 NOTE — SUBJECTIVE & OBJECTIVE
"Interval history- last night was very agitated after getting to floor after precedex given in icu for anxiety wore off and [facility] team let me know about it also, they gave zyprexa for agitation and had to restrain her as she pulled out IVS and her daughter at bedside today stayed with her last night and reports she was up all night talking and seeing things and would not settle down. On review it appears that urine sent before transfer here does have UTIS which may be cause of some of lingering confusion with mild resistant ecoli UTI as well as E Faecalis UTI. The ecoli is resistant to ampicillin so have to do double coverage with rocephin and vancomycin given this and she has a sulfa allergy but no other antibiotic allergies we reviewed on exam. Cr improved to 1.7 after IVF yesterday but hg 6.8 and not unexpected given extensive surgery and 2 U to transfuse now. Albumin in 1's and boost ordered and calcium 8.4 corrected but still low and daily calcium orered. She is not eating much, on full liquids not only ate a few bites of brekfast and doesn't want to upgarde yet. Glucose in 100s now after given 28 U long acting last night, will watch trends today and plan for 25U tonight for now pending trends and hold meal time unless she eats and glucose is over 160 with meals. She has symptoms if under 70 but per her dexcom had no hypoglycemia when read recently per endo notes. Glucose was up to 300s yesterady after he OR.   Has some sensation of chest tightness per nursing and temperature of 99 as blood running. Has very high anxiety baseline and so will given ativan 1/2 mg x 2. Even earlier when I told her she had a UTI, she said "my cancer is back??" And not sure why she thought I said that but jumped to something like that from me just saying she had a UTi to her daughter so is very very anxious. Her daughter is hoping she sleeps today as did not sleep at all last night.  Nicolas be NWB to the right leg for 8 weeks after " ankle surgery which precludes any recs from pelvic post op recs.            Review of patient's allergies indicates:   Allergen Reactions    Novolin 70/30 (semi-synthetic) Nausea And Vomiting     Severe vomiting on Relion 70/30    Sulfa (sulfonamide antibiotics) Anaphylaxis    Talwin [pentazocine lactate] Anaphylaxis    Victoza [liraglutide] Nausea And Vomiting    Glipizide Nausea Only    Citric acid     Codeine     Influenza virus vaccines Hives    Iodine and iodide containing products Hives    Levetiracetam Itching    Rituxan [rituximab] Hives    Zoloft [sertraline] Nausea And Vomiting       No current facility-administered medications on file prior to encounter.     Current Outpatient Medications on File Prior to Encounter   Medication Sig    Bacillus coagulans (DIGESTIVE ADVANTAGE IMMUNE ORAL) Take by mouth.    diclofenac sodium (VOLTAREN TOP) Apply topically.    gabapentin (NEURONTIN) 300 MG capsule Take 1 capsule (300 mg total) by mouth 3 (three) times daily.    hydrALAZINE (APRESOLINE) 50 MG tablet Take 1 tablet (50 mg total) by mouth every 8 (eight) hours.    LANTUS SOLOSTAR U-100 INSULIN 100 unit/mL (3 mL) InPn pen Inject 12 Units into the skin every evening.    NOVOLOG FLEXPEN U-100 INSULIN 100 unit/mL (3 mL) InPn pen Inject 20 Units into the skin 3 (three) times daily with meals.    albuterol (PROVENTIL/VENTOLIN HFA) 90 mcg/actuation inhaler Inhale 2 puffs into the lungs every 6 (six) hours as needed for Wheezing or Shortness of Breath.    ASCORBIC ACID, VITAMIN C, ORAL Take by mouth once daily.    aspirin 81 MG Chew Take 1 tablet (81 mg total) by mouth once daily.    atorvastatin (LIPITOR) 80 MG tablet Take 1 tablet by mouth in the evening    cholecalciferol, vitamin D3, (VITAMIN D3) 50 mcg (2,000 unit) Cap Take 2 capsules by mouth 2 (two) times daily.    cyanocobalamin (VITAMIN B-12) 1000 MCG tablet Take 100 mcg by mouth once daily.    DEXCOM G7  Misc Use 1  to track blood glucose,  "ICD10: E11.65    DEXCOM G7 SENSOR Samina Use 1 sensor every 10 days to track blood glucose, ICD10: E11.65, okay with 90 day supply if possible    EPINEPHrine (EPIPEN) 0.3 mg/0.3 mL AtIn Inject 0.3 mLs (0.3 mg total) into the muscle once. for 1 dose    ESOMEPRAZOLE MAGNESIUM ORAL Take by mouth as needed. (Patient not taking: Reported on 5/28/2024)    FLUoxetine 40 MG capsule Take 1 capsule (40 mg total) by mouth once daily.    isosorbide mononitrate (IMDUR) 30 MG 24 hr tablet Take 1 tablet (30 mg total) by mouth once daily.    LIDOcaine (LIDODERM) 5 % Place 1 patch onto the skin once daily. Remove & Discard patch within 12 hours or as directed by MD.    LORazepam (ATIVAN) 1 MG tablet Take 1 tablet (1 mg total) by mouth 2 (two) times daily as needed for Anxiety.    magnesium oxide (MAG-OX) 400 mg tablet Take 400 mg by mouth once daily.    melatonin 10 mg Cap Take 10 mg by mouth nightly.    metoprolol succinate (TOPROL-XL) 25 MG 24 hr tablet Take 1 tablet (25 mg total) by mouth once daily.    multivitamin/iron/folic acid (CENTRUM COMPLETE ORAL) Take by mouth.    OZEMPIC 1 mg/dose (4 mg/3 mL) Inject 1 mg into the skin every 7 days.    pantoprazole (PROTONIX) 40 MG tablet Take 1 tablet (40 mg total) by mouth once daily.    pen needle, diabetic (BD ULTRA-FINE SAGAR PEN NEEDLE) 32 gauge x 5/32" Ndle One pen needle use with insulin pen 4 times a day.  ICD-10: E11.9    semaglutide (OZEMPIC) 0.25 mg or 0.5 mg (2 mg/3 mL) pen injector Inject 0.5 mg once weekly thereafter    ticagrelor (BRILINTA) 90 mg tablet Take 1 tablet (90 mg total) by mouth 2 (two) times daily.    vitamin E 1000 UNIT capsule Take 1,000 Units by mouth once daily.     Family History       Problem Relation (Age of Onset)    Allergy (severe) Daughter    Cancer Mother, Father    Diabetes Sister    Heart disease Mother    Hypertension Sister    Lung cancer Brother    No Known Problems Daughter          Tobacco Use    Smoking status: Never    Smokeless tobacco: " Never   Substance and Sexual Activity    Alcohol use: Not Currently    Drug use: Never    Sexual activity: Not Currently     Partners: Male     Review of Systems   Constitutional:  Positive for activity change. Negative for chills and fever.   HENT:  Negative for congestion, hearing loss, rhinorrhea and sore throat.    Eyes:  Negative for visual disturbance.   Respiratory:  Negative for shortness of breath.    Cardiovascular:  Negative for chest pain and leg swelling.   Gastrointestinal:  Negative for abdominal pain, constipation, diarrhea, nausea and vomiting.   Genitourinary:  Negative for dysuria, frequency and urgency.   Musculoskeletal:  Positive for arthralgias and myalgias.   Neurological:  Positive for numbness (chronic).     Objective:     Vital Signs (Most Recent):  Temp: 99.4 °F (37.4 °C) (08/17/24 1007)  Pulse: 78 (08/17/24 1007)  Resp: 20 (08/17/24 1007)  BP: (!) 160/68 (08/17/24 1007)  SpO2: 97 % (08/17/24 1007) Vital Signs (24h Range):  Temp:  [98 °F (36.7 °C)-99.4 °F (37.4 °C)] 99.4 °F (37.4 °C)  Pulse:  [72-88] 78  Resp:  [14-20] 20  SpO2:  [94 %-100 %] 97 %  BP: (113-170)/(49-70) 160/68     Weight: 81.2 kg (179 lb 0.2 oz)  Body mass index is 26.44 kg/m².     Physical Exam  Constitutional:       General: She is not in acute distress.     Appearance: Normal appearance. She is not ill-appearing.      Comments: Daughter at bedside, charge jonathan placing IV in LUE. anxious   HENT:      Head: Normocephalic and atraumatic.   Eyes:      Extraocular Movements: Extraocular movements intact.   Cardiovascular:      Rate and Rhythm: Normal rate and regular rhythm.      Heart sounds: Murmur heard.      No friction rub. No gallop.   Pulmonary:      Effort: Pulmonary effort is normal.      Breath sounds: Normal breath sounds. No wheezing, rhonchi or rales.   Abdominal:      General: There is no distension.      Tenderness: There is no abdominal tenderness. There is no guarding.   Musculoskeletal:          General: Tenderness and signs of injury present.      Right lower leg: No edema.      Left lower leg: No edema.      Comments: [place] to right ankle and right LE post op.  TTP over R hip  Sensation diminished to toes 2/2 neuropathy (chronic)   Neurological:      General: No focal deficit present.      Mental Status: She is alert and oriented to person, place, and time.      Comments: At times has jerking motions to UE   Psychiatric:      Comments: Very anxious                Significant Labs: All pertinent labs within the past 24 hours have been reviewed.  BMP:   Recent Labs   Lab 08/16/24 0456 08/17/24  0430   * 163*   * 132*   K 4.4 3.5    111*   CO2 18* 15*   BUN 36* 39*   CREATININE 2.4* 1.7*   CALCIUM 8.0* 6.2*   MG 2.0  --      CBC:   Recent Labs   Lab 08/16/24 0456 08/16/24  1005 08/16/24  1522 08/16/24  1924 08/17/24  0430   WBC 8.55  --   --  16.38* 10.36   HGB 8.2*  --   --  8.9* 6.8*   HCT 26.7*   < > 24* 27.7* 20.9*   *  --   --  178 140*    < > = values in this interval not displayed.       Significant Imaging: I have reviewed all pertinent imaging results/findings within the past 24 hours.    Imaging Results              CT Ankle (Including Hindfoot) Without Contrast Right (Final result)  Result time 08/14/24 04:49:46      Final result by Portillo Oquendo MD (08/14/24 04:49:46)                   Impression:      Near anatomic alignment of complex mildly displaced distal tibia fracture and mildly displaced distal fibular fracture.    Electronically signed by resident: Hira Patel  Date:    08/14/2024  Time:    03:56    Electronically signed by: Portillo Oquendo MD  Date:    08/14/2024  Time:    04:49               Narrative:    EXAMINATION:  CT ANKLE (INCLUDING HINDFOOT) WITHOUT CONTRAST RIGHT; CT 3D RENDERING WO INDEPENDENT WORKSTATION    CLINICAL HISTORY:  Fracture, ankle;; Fracture, ankle;per order request;    TECHNIQUE:  Axial 0.625-mm images of the right ankle  obtained without intravenous contrast.  Data submitted for coronal and sagittal reformats.  3D reconstructed images were acquired by post processing on an independent workstation with concurrent supervision and archived for permanent record. 3D imaging of the right ankle was obtained for further evaluation and operative planning if needed.    COMPARISON:  Ankle radiograph 03/14/2024.    FINDINGS:  Bones are demineralized.  There is a cast in place.  There is mildly displaced comminuted distal tibial fracture and a minimally displaced distal fibular fracture with medial angulation of the fracture fragment.  Intra-articular extension fracture fragments which are mildly displaced involving the distal tibia near anatomic alignment of fracture fragments.  Fractures involve the posterior and medial malleoli.  No dislocation.  There are a few foci of subcutaneous gas overlying the tibial fracture, possibly relating to trauma or reduction procedure though correlation is advised.  Dense calcific tendinosis of the posterior tibial tendon.  No full-thickness injury of the Achilles tendon.  Soft tissue edema about the ankle.  Prominent vascular calcifications noted.                                       CT 3D Rendering WO Independent Workstation (Final result)  Result time 08/14/24 04:49:46      Final result by Portillo Oquendo MD (08/14/24 04:49:46)                   Impression:      Near anatomic alignment of complex mildly displaced distal tibia fracture and mildly displaced distal fibular fracture.    Electronically signed by resident: Hira Patel  Date:    08/14/2024  Time:    03:56    Electronically signed by: Portillo Oquendo MD  Date:    08/14/2024  Time:    04:49               Narrative:    EXAMINATION:  CT ANKLE (INCLUDING HINDFOOT) WITHOUT CONTRAST RIGHT; CT 3D RENDERING WO INDEPENDENT WORKSTATION    CLINICAL HISTORY:  Fracture, ankle;; Fracture, ankle;per order request;    TECHNIQUE:  Axial 0.625-mm images of  the right ankle obtained without intravenous contrast.  Data submitted for coronal and sagittal reformats.  3D reconstructed images were acquired by post processing on an independent workstation with concurrent supervision and archived for permanent record. 3D imaging of the right ankle was obtained for further evaluation and operative planning if needed.    COMPARISON:  Ankle radiograph 03/14/2024.    FINDINGS:  Bones are demineralized.  There is a cast in place.  There is mildly displaced comminuted distal tibial fracture and a minimally displaced distal fibular fracture with medial angulation of the fracture fragment.  Intra-articular extension fracture fragments which are mildly displaced involving the distal tibia near anatomic alignment of fracture fragments.  Fractures involve the posterior and medial malleoli.  No dislocation.  There are a few foci of subcutaneous gas overlying the tibial fracture, possibly relating to trauma or reduction procedure though correlation is advised.  Dense calcific tendinosis of the posterior tibial tendon.  No full-thickness injury of the Achilles tendon.  Soft tissue edema about the ankle.  Prominent vascular calcifications noted.                                       X-Ray Ankle Complete Right (Final result)  Result time 08/14/24 03:44:09      Final result by Portillo Oquendo MD (08/14/24 03:44:09)                   Impression:      Similar alignment of distal tibia and distal fibula fractures post reduction.      Electronically signed by: Portillo Oquendo MD  Date:    08/14/2024  Time:    03:44               Narrative:    EXAMINATION:  XR ANKLE COMPLETE 3 VIEW RIGHT    CLINICAL HISTORY:  Post reduction;    TECHNIQUE:  AP, lateral, and oblique images of the right ankle were performed.    COMPARISON:  08/13/2024.    FINDINGS:  Exam quality is limited by suboptimal positioning and overlapping cast material.  Acute fractures of the distal tibia and distal fibula as seen  previously.  Bones are demineralized.  No gross dislocation or significant worsening alignment of fracture fragments.  Soft tissue edema.                                       CT Pelvis Without Contrast (Final result)  Result time 08/13/24 20:58:12      Final result by Quiana Chawla MD (08/13/24 20:58:12)                   Impression:      Acute traumatic fractures involving the right iliac wing, the anterior pillar of the right acetabulum and the lateral most aspect of the right superior ramus, and the right inferior pubic ramus.  No hip dislocation.      Electronically signed by: Quiana Chawla  Date:    08/13/2024  Time:    20:58               Narrative:    EXAMINATION:  CT PELVIS WITHOUT CONTRAST    CLINICAL HISTORY:  Pelvic trauma;    TECHNIQUE:  1.25 mm axial images were obtained through the pelvis from above the iliac crest through the upper femoral shafts.    COMPARISON:  Plain hip radiograph 08/13/2020 full    FINDINGS:  There is a minimally displaced fracture of the right iliac wing.  There are comminuted fractures involving the anterior pillar of the right acetabulum and the lateral most aspect of the right superior ramus.  There is comminuted and overlapping fracture of the right inferior pubic ramus.  The hips are not dislocated.  The SI joints are intact.  There are degenerative changes seen at the sacroiliac joints and the pubic symphysis.  Bones are osteopenic.  Incidental note is made of vascular calcifications and a small fat containing umbilical hernia.                                       CT Lumbar Spine Without Contrast (Final result)  Result time 08/13/24 20:31:47      Final result by Quiana Chawla MD (08/13/24 20:31:47)                   Impression:      No acute lumbar fracture.  Multilevel degenerative change greatest at L4/L5.    Small bilateral pleural effusions right greater than left.    Gallbladder sludge or gravel-like gallstones.      Electronically signed by: Quiana  Denton  Date:    08/13/2024  Time:    20:31               Narrative:    EXAMINATION:  CT OF THE LUMBAR SPINE WITHOUT    CLINICAL HISTORY:  Complaining of right hip pain secondary to fall from standing.    TECHNIQUE:  1.25 mm axial images were obtained through the lumbar spine. Coronal and sagittal reformatted images were provided.    COMPARISON:  None.    FINDINGS:  There is no lumbar vertebral body fracture.  There is grade 1 anterolisthesis of L4 on L5.  At L3/L4, there is moderate central canal narrowing secondary to the facet arthrosis and ligamentum flavum hypertrophy without significant foraminal stenosis.  At L4/L5, there is no stone significant central canal narrowing, but there is bilateral mild foraminal narrowing.  Secondary to ligamentum flavum hypertrophy and facet arthrosis.  At L5/S1, there is a broad-based disc bulge without any significant central canal stenosis or foraminal stenosis.  There is small marginal osteophytes throughout.  Vacuum phenomena is seen at L4/L5 with mild intervertebral disc space narrowing.  Incidental note is made of small bilateral pleural effusions right greater than left and gallbladder sludge or gravel-like gallstones.                                       X-Ray Chest AP Portable (Final result)  Result time 08/13/24 17:49:32      Final result by Eddie Montilla MD (08/13/24 17:49:32)                   Impression:      1. Interstitial findings may reflect edema versus chronic change, no large focal consolidation.      Electronically signed by: Eddie Montilla MD  Date:    08/13/2024  Time:    17:49               Narrative:    EXAMINATION:  XR CHEST AP PORTABLE    CLINICAL HISTORY:  Chest Pain;    TECHNIQUE:  Single frontal view of the chest was performed.    COMPARISON:  11/03/2023    FINDINGS:  The cardiomediastinal silhouette is prominent, magnified by technique, similar to the previous exam noting calcification of the aorta..  There is no pleural effusion.  The  trachea is midline.  The lungs are symmetrically expanded bilaterally with coarse interstitial attenuation in a central hilar distribution..  No large focal consolidation seen.  There is no pneumothorax.  The osseous structures are remarkable for degenerative changes..                                       X-Ray Ankle Complete Right (Final result)  Result time 08/13/24 17:50:39      Final result by Eddie Montilla MD (08/13/24 17:50:39)                   Impression:      1. Distal tibial and fibular fractures as described.      Electronically signed by: Eddie Montilla MD  Date:    08/13/2024  Time:    17:50               Narrative:    EXAMINATION:  XR ANKLE COMPLETE 3 VIEW RIGHT    CLINICAL HISTORY:  Unspecified fall, initial encounter    TECHNIQUE:  AP, lateral, and oblique images of the right ankle were performed.    COMPARISON:  Radiograph of the foot 06/01/2021    FINDINGS:  Three views right ankle.    There is osteopenia.  There is acute appearing fracture involving the distal aspect of the fibula.  There is comminuted fracture of the medial malleolus.  The ankle mortise is intact.  There is edema about the ankle.  The posterior aspect of the tibia appears intact.  There are degenerative changes of the calcaneus.  There is vascular calcification.                                       X-Ray Hip 2 or 3 views Right with Pelvis when performed (Final result)  Result time 08/13/24 17:53:13      Final result by Eddie Montilla MD (08/13/24 17:53:13)                   Impression:      1. Fractures of the right inferior and superior pubic rami.  2. There is cortical irregularity involving the right iliac wing, concerning for additional fracture.      Electronically signed by: Eddie Montilla MD  Date:    08/13/2024  Time:    17:53               Narrative:    EXAMINATION:  XR HIP WITH PELVIS WHEN PERFORMED 2 OR 3 VIEWS RIGHT    CLINICAL HISTORY:  Unspecified fall, initial encounter    TECHNIQUE:  AP view of the  pelvis and frog leg lateral view of the right hip were performed.    COMPARISON:  None    FINDINGS:  Three views right hip.    The bilateral sacroiliac joints are intact.  The pubic symphysis is intact.  There is osteopenia.  There are fractures of the right superior and inferior pubic rami.  The bilateral femoroacetabular joints are intact noting degenerative change.  There is fracture involving the right iliac wing.                                       X-Ray Knee 1 or 2 View Right (Final result)  Result time 08/13/24 17:53:54   Procedure changed from X-Ray Knee 3 View Right     Final result by Eddie Montilla MD (08/13/24 17:53:54)                   Impression:      1. No acute displaced fracture or dislocation of the knee.      Electronically signed by: Eddie Montilla MD  Date:    08/13/2024  Time:    17:53               Narrative:    EXAMINATION:  XR KNEE 1 OR 2 VIEW RIGHT    CLINICAL HISTORY:  Pain;  Unspecified fall, initial encounter    TECHNIQUE:  AP and lateral views of the right knee were performed.    COMPARISON:  11/20/2014    FINDINGS:  Two views right knee.    There is osteopenia.  There are degenerative changes of the knee.  There is vascular calcification.  No large right knee joint effusion.

## 2024-08-17 NOTE — ASSESSMENT & PLAN NOTE
- hx noted  - continue home fluoxetine   High anxiety at baseline per her and daugher, doctors have been hesitant to do acute anxiety meds due to fall risk they said, was not on on admit, high anxiety 8/15, will try ativan x 1 as suspect having possible panic attack after therapy sesion based on nusring description and did better after this  Very anxious in pacu and anesthesia bedside, giving precedex x 1 to relex,and required zyprexa on 8/16 PM and restraints to calm down as confused and anxious.  Will give ativan once as showing signs 8/17 of panic with chest pressure and tingling.

## 2024-08-17 NOTE — ASSESSMENT & PLAN NOTE
Creatine stable for now. BMP reviewed- noted Estimated Creatinine Clearance: 33.6 mL/min (A) (based on SCr of 1.7 mg/dL (H)). according to latest data. Based on current GFR, CKD stage is stage 4 - GFR 15-29.  Monitor UOP and serial BMP and adjust therapy as needed. Renally dose meds. Avoid nephrotoxic medications and procedures.  - Cr 1.9 (near most recent baseline which is 1.6 to 1.8) on admit but 2.2 now so will do light IVF on 8/15 but slightly worse at 2.4 now with bobbi and closer to baseline 8/17 at 1.7  - daily BMP  - avoid nephrotoxic medications

## 2024-08-17 NOTE — SUBJECTIVE & OBJECTIVE
Principal Problem:Closed right pilon fracture    Principal Orthopedic Problem: same + R both column acetabular fracture    Interval History: Pt seen and examined at bedside. Patient agitated overnight requiring restraints. Hemoglobin 6.8 this AM. 1 unit released. Denies fevers, chills, chest pain, SOB, N/V/D. Pain well controlled.       Review of patient's allergies indicates:   Allergen Reactions    Novolin 70/30 (semi-synthetic) Nausea And Vomiting     Severe vomiting on Relion 70/30    Sulfa (sulfonamide antibiotics) Anaphylaxis    Talwin [pentazocine lactate] Anaphylaxis    Victoza [liraglutide] Nausea And Vomiting    Glipizide Nausea Only    Citric acid     Codeine     Influenza virus vaccines Hives    Iodine and iodide containing products Hives    Levetiracetam Itching    Rituxan [rituximab] Hives    Zoloft [sertraline] Nausea And Vomiting       Current Facility-Administered Medications   Medication    0.9%  NaCl infusion (for blood administration)    0.9%  NaCl infusion (for blood administration)    0.9%  NaCl infusion    acetaminophen tablet 1,000 mg    albuterol-ipratropium 2.5 mg-0.5 mg/3 mL nebulizer solution 3 mL    apixaban tablet 2.5 mg    bisacodyL suppository 10 mg    calcium carbonate 200 mg calcium (500 mg) chewable tablet 1,000 mg    dextrose 10% bolus 125 mL 125 mL    dextrose 10% bolus 125 mL 125 mL    dextrose 10% bolus 125 mL 125 mL    dextrose 10% bolus 250 mL 250 mL    dextrose 10% bolus 250 mL 250 mL    dextrose 10% bolus 250 mL 250 mL    diphenhydrAMINE capsule 25 mg    FLUoxetine capsule 40 mg    glucagon (human recombinant) injection 1 mg    glucagon (human recombinant) injection 1 mg    glucose chewable tablet 16 g    glucose chewable tablet 16 g    glucose chewable tablet 24 g    glucose chewable tablet 24 g    insulin aspart U-100 pen 0-10 Units    insulin aspart U-100 pen 13 Units    insulin glargine U-100 (Lantus) pen 11 Units    isosorbide mononitrate 24 hr tablet 30 mg     "melatonin tablet 6 mg    methocarbamoL tablet 500 mg    metoprolol succinate (TOPROL-XL) 24 hr tablet 25 mg    morphine injection 2 mg    ondansetron disintegrating tablet 8 mg    oxyCODONE immediate release tablet 5 mg    oxyCODONE immediate release tablet Tab 10 mg    pantoprazole EC tablet 40 mg    polyethylene glycol packet 17 g    promethazine tablet 25 mg    sodium bicarbonate tablet 650 mg    sodium chloride 0.9% flush 10 mL     Objective:     Vital Signs (Most Recent):  Temp: 98.1 °F (36.7 °C) (08/17/24 0807)  Pulse: 79 (08/17/24 0807)  Resp: 16 (08/17/24 0807)  BP: (!) 113/49 (08/17/24 0807)  SpO2: 97 % (08/17/24 0807) Vital Signs (24h Range):  Temp:  [98.1 °F (36.7 °C)-99 °F (37.2 °C)] 98.1 °F (36.7 °C)  Pulse:  [76-88] 79  Resp:  [14-20] 16  SpO2:  [94 %-100 %] 97 %  BP: (113-170)/(49-70) 113/49     Weight: 81.2 kg (179 lb 0.2 oz)  Height: 5' 9" (175.3 cm)  Body mass index is 26.44 kg/m².      Intake/Output Summary (Last 24 hours) at 8/17/2024 0817  Last data filed at 8/16/2024 1536  Gross per 24 hour   Intake 3113 ml   Output 975 ml   Net 2138 ml          Ortho/SPM Exam     Gen: NAD, WDWN  CV: peripherally well perfused  Resp: unlabored respirations, symmetric chest rise    MSK:  RLE:  Surgical dressings over right hip and abdomen c/d/I  Posterior slab splint in place  Fires Quad, wiggles toes  SILT  Compartments soft  Brisk cap refill          Significant Labs: CBC:   Recent Labs   Lab 08/16/24  0456 08/16/24  1005 08/16/24  1522 08/16/24  1924 08/17/24  0430   WBC 8.55  --   --  16.38* 10.36   HGB 8.2*  --   --  8.9* 6.8*   HCT 26.7*   < > 24* 27.7* 20.9*   *  --   --  178 140*    < > = values in this interval not displayed.     CMP:   Recent Labs   Lab 08/16/24  0456 08/17/24  0430   * 132*   K 4.4 3.5    111*   CO2 18* 15*   * 163*   BUN 36* 39*   CREATININE 2.4* 1.7*   CALCIUM 8.0* 6.2*   PROT 5.7* 3.9*   ALBUMIN 2.0* 1.3*   BILITOT 0.3 0.4   ALKPHOS 164* 107   AST 50* 38 "   ALT 13 <5*   ANIONGAP 9 6*     All pertinent labs within the past 24 hours have been reviewed.    Significant Imaging: I have reviewed all pertinent imaging results/findings.

## 2024-08-17 NOTE — ASSESSMENT & PLAN NOTE
KYA is likely due to pre-renal azotemia due to intravascular volume depletion. Baseline creatinine is  1.6 to 1.8 . Most recent creatinine and eGFR are listed below.  Recent Labs     08/15/24  0457 08/16/24  0456 08/17/24  0430   CREATININE 2.2* 2.4* 1.7*   EGFRNORACEVR 23.1* 20.8* 31.5*        Plan  - KYA is improving Cr 1.7 today and in baseine ranges after IVF down from 2.4  - Avoid nephrotoxins and renally dose meds for GFR listed above  - Monitor urine output, serial BMP, and adjust therapy as needed    -

## 2024-08-17 NOTE — ASSESSMENT & PLAN NOTE
S/p ORIF R acetabulum 8/16    - NWB RLE x 10 weeks  - Eliquis 2.5 BID x 10 weeks  - leave dressings dry and intact  - Post op ancef  - dc chong

## 2024-08-17 NOTE — PROGRESS NOTES
Pharmacokinetic Initial Assessment: IV Vancomycin    Assessment/Plan:    Initiate intravenous vancomycin with loading dose of 1250 mg once with subsequent doses when random concentrations are less than 20 mcg/mL  Desired empiric serum trough concentration is 10 to 20 mcg/mL  Draw vancomycin random level on 8/18/24 at 1000.  Pharmacy will continue to follow and monitor vancomycin.      Please contact pharmacy at extension [phone] with any questions regarding this assessment.     Thank you for the consult,   Migel Woo       Patient brief summary:  Lorena Contreras is a 73 y.o. female initiated on antimicrobial therapy with IV Vancomycin for treatment of suspected urinary tract infection    Drug Allergies:   Review of patient's allergies indicates:   Allergen Reactions    Novolin 70/30 (semi-synthetic) Nausea And Vomiting     Severe vomiting on Relion 70/30    Sulfa (sulfonamide antibiotics) Anaphylaxis    Talwin [pentazocine lactate] Anaphylaxis    Victoza [liraglutide] Nausea And Vomiting    Glipizide Nausea Only    Citric acid     Codeine     Influenza virus vaccines Hives    Iodine and iodide containing products Hives    Levetiracetam Itching    Rituxan [rituximab] Hives    Zoloft [sertraline] Nausea And Vomiting       Actual Body Weight:   81.2 kg    Renal Function:   Estimated Creatinine Clearance: 33.6 mL/min (A) (based on SCr of 1.7 mg/dL (H)).,     Dialysis Method (if applicable):  N/A    CBC (last 72 hours):  Recent Labs   Lab Result Units 08/15/24  0457 08/16/24  0456 08/16/24  1924 08/17/24  0430   WBC K/uL 8.20 8.55 16.38* 10.36   Hemoglobin g/dL 8.5* 8.2* 8.9* 6.8*   Hematocrit % 27.5* 26.7* 27.7* 20.9*   Platelets K/uL 136* 130* 178 140*   Gran % % 71.6 69.1 88.4* 75.3*   Lymph % % 15.9* 17.1* 3.8* 13.1*   Mono % % 9.8 10.3 7.0 10.5   Eosinophil % % 1.7 2.7 0.0 0.1   Basophil % % 0.6 0.4 0.2 0.3   Differential Method  Automated Automated Automated Automated       Metabolic Panel (last 72  "hours):  Recent Labs   Lab Result Units 08/15/24  0457 08/16/24  0456 08/17/24  0430   Sodium mmol/L 138 129* 132*   Potassium mmol/L 4.9 4.4 3.5   Chloride mmol/L 105 102 111*   CO2 mmol/L 21* 18* 15*   Glucose mg/dL 276* 136* 163*   BUN mg/dL 36* 36* 39*   Creatinine mg/dL 2.2* 2.4* 1.7*   Albumin g/dL 2.2* 2.0* 1.3*   Total Bilirubin mg/dL 0.3 0.3 0.4   Alkaline Phosphatase U/L 154* 164* 107   AST U/L 29 50* 38   ALT U/L 20 13 <5*   Magnesium mg/dL 2.2 2.0  --        Drug levels (last 3 results):  No results for input(s): "VANCOMYCINRA", "VANCORANDOM", "VANCOMYCINPE", "VANCOPEAK", "VANCOMYCINTR", "VANCOTROUGH" in the last 72 hours.    Microbiologic Results:  Microbiology Results (last 7 days)       Procedure Component Value Units Date/Time    Urine culture [[id number]]  (Abnormal)  (Susceptibility) Collected: 08/14/24 0229    Order Status: Completed Specimen: Urine Updated: 08/16/24 1351     Urine Culture, Routine ESCHERICHIA COLI  >100,000 cfu/ml        ENTEROCOCCUS FAECALIS  > 100,000 cfu/ml      Narrative:      Specimen Source->Urine            "

## 2024-08-17 NOTE — PROGRESS NOTES
[facility] Medicine  Progress Note    Patient Name: Lorena Contreras  MRN: [id number]  Patient Class: IP- Inpatient   Admission Date: 8/13/2024  Length of Stay: 3 days  Attending Physician: Rupal Ochoa MD  Primary Care Provider: Jorge Paris MD        Subjective:     Principal Problem:Closed right pilon fracture        HPI:  Lorena Contreras is 72 y/o with PMHx of DM2, Fibromyalgia, HTN, HLD, CVA, MI, and CAD presents to ED via EMS as a [place] transfer for a fall. Pt was walking into grocery store when she twisted her ankle and fell despite attempts to catch herself. Denies LOC or head trauma. Patient fell on her right side. Had immediate pain to her ankle and hip. Pt was unable to ambulate due to pain. Pain is worse in her ankle. States it has subsided s/p pain medications. Pain has been exacerbated when moving. Had an episode of vomiting while splinting ankle but otherwise no further episodes. Denies fever, chills, chest pain, SOB, abdominal pain and urinary symptoms. Has numbness/ tingling to bilateral feet which she states is chronic 2/2 neuropathy.    In the ED: Afebrile, VSS. H&H 10.9 & 33.2. Cr 1.9. Glucose 348. LFTs AST 50, ALT 45. . Urine and drug toxicology screening pending. Chest Xray impression was interstitial findings may reflect edema versus chronic change, no large focal consolidation. Right Ankle Xray impression was distal tibial and fibular fractures as described. Right Hip Xray impression was fractures of the right inferior and superior pubic rami. There is cortical irregularity involving the right iliac wing, concerning for additional fracture. Right Knee Xray showed  no acute displaced fracture or dislocation of the knee. CT Pelvis showed acute traumatic fractures involving the right iliac wing, the anterior pillar of the right acetabulum and the lateral most aspect of the right superior ramus, and the right inferior pubic ramus. No hip  dislocation. CT Lumbar impression was There is no lumbar vertebral body fracture. There is grade 1 anterolisthesis of L4 on L5. At L3/L4, there is moderate central canal narrowing secondary to the facet arthrosis and ligamentum flavum hypertrophy without significant foraminal stenosis. At L4/L5, there is no stone significant central canal narrowing, but there is bilateral mild foraminal narrowing. Secondary to ligamentum flavum hypertrophy and facet arthrosis. At L5/S1, there is a broad-based disc bulge without any significant central canal stenosis or foraminal stenosis. Pain medications given in the ED. Ortho consulted. Admitted to .        Overview/Hospital Course:  No notes on file    Interval history- last night was very agitated after getting to floor after precedex given in icu for anxiety wore off and [facility] team let me know about it also, they gave zyprexa for agitation and had to restrain her as she pulled out IVS and her daughter at bedside today stayed with her last night and reports she was up all night talking and seeing things and would not settle down. On review it appears that urine sent before transfer here does have UTIS which may be cause of some of lingering confusion with mild resistant ecoli UTI as well as E Faecalis UTI. The ecoli is resistant to ampicillin so have to do double coverage with rocephin and vancomycin given this and she has a sulfa allergy but no other antibiotic allergies we reviewed on exam. Cr improved to 1.7 after IVF yesterday but hg 6.8 and not unexpected given extensive surgery and 2 U to transfuse now. Albumin in 1's and boost ordered and calcium 8.4 corrected but still low and daily calcium orered. She is not eating much, on full liquids not only ate a few bites of brekfast and doesn't want to upgarde yet. Glucose in 100s now after given 28 U long acting last night, will watch trends today and plan for 25U tonight for now pending trends and hold meal time unless she  "eats and glucose is over 160 with meals. She has symptoms if under 70 but per her dexcom had no hypoglycemia when read recently per endo notes. Glucose was up to 300s yesterady after he OR.   Has some sensation of chest tightness per nursing and temperature of 99 as blood running. Has very high anxiety baseline and so will given ativan 1/2 mg x 2. Even earlier when I told her she had a UTI, she said "my cancer is back??" And not sure why she thought I said that but jumped to something like that from me just saying she had a UTi to her daughter so is very very anxious. Her daughter is hoping she sleeps today as did not sleep at all last night.  Nicolas be NWB to the right leg for 8 weeks after ankle surgery which precludes any recs from pelvic post op recs.            Review of patient's allergies indicates:   Allergen Reactions    Novolin 70/30 (semi-synthetic) Nausea And Vomiting     Severe vomiting on Relion 70/30    Sulfa (sulfonamide antibiotics) Anaphylaxis    Talwin [pentazocine lactate] Anaphylaxis    Victoza [liraglutide] Nausea And Vomiting    Glipizide Nausea Only    Citric acid     Codeine     Influenza virus vaccines Hives    Iodine and iodide containing products Hives    Levetiracetam Itching    Rituxan [rituximab] Hives    Zoloft [sertraline] Nausea And Vomiting       No current facility-administered medications on file prior to encounter.     Current Outpatient Medications on File Prior to Encounter   Medication Sig    Bacillus coagulans (DIGESTIVE ADVANTAGE IMMUNE ORAL) Take by mouth.    diclofenac sodium (VOLTAREN TOP) Apply topically.    gabapentin (NEURONTIN) 300 MG capsule Take 1 capsule (300 mg total) by mouth 3 (three) times daily.    hydrALAZINE (APRESOLINE) 50 MG tablet Take 1 tablet (50 mg total) by mouth every 8 (eight) hours.    LANTUS SOLOSTAR U-100 INSULIN 100 unit/mL (3 mL) InPn pen Inject 12 Units into the skin every evening.    NOVOLOG FLEXPEN U-100 INSULIN 100 unit/mL (3 mL) InPn pen " Inject 20 Units into the skin 3 (three) times daily with meals.    albuterol (PROVENTIL/VENTOLIN HFA) 90 mcg/actuation inhaler Inhale 2 puffs into the lungs every 6 (six) hours as needed for Wheezing or Shortness of Breath.    ASCORBIC ACID, VITAMIN C, ORAL Take by mouth once daily.    aspirin 81 MG Chew Take 1 tablet (81 mg total) by mouth once daily.    atorvastatin (LIPITOR) 80 MG tablet Take 1 tablet by mouth in the evening    cholecalciferol, vitamin D3, (VITAMIN D3) 50 mcg (2,000 unit) Cap Take 2 capsules by mouth 2 (two) times daily.    cyanocobalamin (VITAMIN B-12) 1000 MCG tablet Take 100 mcg by mouth once daily.    DEXCOM G7  Misc Use 1  to track blood glucose, ICD10: E11.65    DEXCOM G7 SENSOR Samina Use 1 sensor every 10 days to track blood glucose, ICD10: E11.65, okay with 90 day supply if possible    EPINEPHrine (EPIPEN) 0.3 mg/0.3 mL AtIn Inject 0.3 mLs (0.3 mg total) into the muscle once. for 1 dose    ESOMEPRAZOLE MAGNESIUM ORAL Take by mouth as needed. (Patient not taking: Reported on 5/28/2024)    FLUoxetine 40 MG capsule Take 1 capsule (40 mg total) by mouth once daily.    isosorbide mononitrate (IMDUR) 30 MG 24 hr tablet Take 1 tablet (30 mg total) by mouth once daily.    LIDOcaine (LIDODERM) 5 % Place 1 patch onto the skin once daily. Remove & Discard patch within 12 hours or as directed by MD.    LORazepam (ATIVAN) 1 MG tablet Take 1 tablet (1 mg total) by mouth 2 (two) times daily as needed for Anxiety.    magnesium oxide (MAG-OX) 400 mg tablet Take 400 mg by mouth once daily.    melatonin 10 mg Cap Take 10 mg by mouth nightly.    metoprolol succinate (TOPROL-XL) 25 MG 24 hr tablet Take 1 tablet (25 mg total) by mouth once daily.    multivitamin/iron/folic acid (CENTRUM COMPLETE ORAL) Take by mouth.    OZEMPIC 1 mg/dose (4 mg/3 mL) Inject 1 mg into the skin every 7 days.    pantoprazole (PROTONIX) 40 MG tablet Take 1 tablet (40 mg total) by mouth once daily.    pen needle,  "diabetic (BD ULTRA-FINE SAGAR PEN NEEDLE) 32 gauge x 5/32" Ndle One pen needle use with insulin pen 4 times a day.  ICD-10: E11.9    semaglutide (OZEMPIC) 0.25 mg or 0.5 mg (2 mg/3 mL) pen injector Inject 0.5 mg once weekly thereafter    ticagrelor (BRILINTA) 90 mg tablet Take 1 tablet (90 mg total) by mouth 2 (two) times daily.    vitamin E 1000 UNIT capsule Take 1,000 Units by mouth once daily.     Family History       Problem Relation (Age of Onset)    Allergy (severe) Daughter    Cancer Mother, Father    Diabetes Sister    Heart disease Mother    Hypertension Sister    Lung cancer Brother    No Known Problems Daughter          Tobacco Use    Smoking status: Never    Smokeless tobacco: Never   Substance and Sexual Activity    Alcohol use: Not Currently    Drug use: Never    Sexual activity: Not Currently     Partners: Male     Review of Systems   Constitutional:  Positive for activity change. Negative for chills and fever.   HENT:  Negative for congestion, hearing loss, rhinorrhea and sore throat.    Eyes:  Negative for visual disturbance.   Respiratory:  Negative for shortness of breath.    Cardiovascular:  Negative for chest pain and leg swelling.   Gastrointestinal:  Negative for abdominal pain, constipation, diarrhea, nausea and vomiting.   Genitourinary:  Negative for dysuria, frequency and urgency.   Musculoskeletal:  Positive for arthralgias and myalgias.   Neurological:  Positive for numbness (chronic).     Objective:     Vital Signs (Most Recent):  Temp: 99.4 °F (37.4 °C) (08/17/24 1007)  Pulse: 78 (08/17/24 1007)  Resp: 20 (08/17/24 1007)  BP: (!) 160/68 (08/17/24 1007)  SpO2: 97 % (08/17/24 1007) Vital Signs (24h Range):  Temp:  [98 °F (36.7 °C)-99.4 °F (37.4 °C)] 99.4 °F (37.4 °C)  Pulse:  [72-88] 78  Resp:  [14-20] 20  SpO2:  [94 %-100 %] 97 %  BP: (113-170)/(49-70) 160/68     Weight: 81.2 kg (179 lb 0.2 oz)  Body mass index is 26.44 kg/m².     Physical Exam  Constitutional:       General: She is not " in acute distress.     Appearance: Normal appearance. She is not ill-appearing.      Comments: Daughter at bedside, charge jonathan placing IV in LUE. anxious   HENT:      Head: Normocephalic and atraumatic.   Eyes:      Extraocular Movements: Extraocular movements intact.   Cardiovascular:      Rate and Rhythm: Normal rate and regular rhythm.      Heart sounds: Murmur heard.      No friction rub. No gallop.   Pulmonary:      Effort: Pulmonary effort is normal.      Breath sounds: Normal breath sounds. No wheezing, rhonchi or rales.   Abdominal:      General: There is no distension.      Tenderness: There is no abdominal tenderness. There is no guarding.   Musculoskeletal:         General: Tenderness and signs of injury present.      Right lower leg: No edema.      Left lower leg: No edema.      Comments: [place] to right ankle and right LE post op.  TTP over R hip  Sensation diminished to toes 2/2 neuropathy (chronic)   Neurological:      General: No focal deficit present.      Mental Status: She is alert and oriented to person, place, and time.      Comments: At times has jerking motions to UE   Psychiatric:      Comments: Very anxious                Significant Labs: All pertinent labs within the past 24 hours have been reviewed.  BMP:   Recent Labs   Lab 08/16/24  0456 08/17/24  0430   * 163*   * 132*   K 4.4 3.5    111*   CO2 18* 15*   BUN 36* 39*   CREATININE 2.4* 1.7*   CALCIUM 8.0* 6.2*   MG 2.0  --      CBC:   Recent Labs   Lab 08/16/24  0456 08/16/24  1005 08/16/24  1522 08/16/24  1924 08/17/24  0430   WBC 8.55  --   --  16.38* 10.36   HGB 8.2*  --   --  8.9* 6.8*   HCT 26.7*   < > 24* 27.7* 20.9*   *  --   --  178 140*    < > = values in this interval not displayed.       Significant Imaging: I have reviewed all pertinent imaging results/findings within the past 24 hours.    Imaging Results              CT Ankle (Including Hindfoot) Without Contrast Right (Final result)  Result  time 08/14/24 04:49:46      Final result by Portillo Oquendo MD (08/14/24 04:49:46)                   Impression:      Near anatomic alignment of complex mildly displaced distal tibia fracture and mildly displaced distal fibular fracture.    Electronically signed by resident: Hira Patel  Date:    08/14/2024  Time:    03:56    Electronically signed by: Portillo Oquendo MD  Date:    08/14/2024  Time:    04:49               Narrative:    EXAMINATION:  CT ANKLE (INCLUDING HINDFOOT) WITHOUT CONTRAST RIGHT; CT 3D RENDERING WO INDEPENDENT WORKSTATION    CLINICAL HISTORY:  Fracture, ankle;; Fracture, ankle;per order request;    TECHNIQUE:  Axial 0.625-mm images of the right ankle obtained without intravenous contrast.  Data submitted for coronal and sagittal reformats.  3D reconstructed images were acquired by post processing on an independent workstation with concurrent supervision and archived for permanent record. 3D imaging of the right ankle was obtained for further evaluation and operative planning if needed.    COMPARISON:  Ankle radiograph 03/14/2024.    FINDINGS:  Bones are demineralized.  There is a cast in place.  There is mildly displaced comminuted distal tibial fracture and a minimally displaced distal fibular fracture with medial angulation of the fracture fragment.  Intra-articular extension fracture fragments which are mildly displaced involving the distal tibia near anatomic alignment of fracture fragments.  Fractures involve the posterior and medial malleoli.  No dislocation.  There are a few foci of subcutaneous gas overlying the tibial fracture, possibly relating to trauma or reduction procedure though correlation is advised.  Dense calcific tendinosis of the posterior tibial tendon.  No full-thickness injury of the Achilles tendon.  Soft tissue edema about the ankle.  Prominent vascular calcifications noted.                                       CT 3D Rendering WO Independent Workstation (Final  result)  Result time 08/14/24 04:49:46      Final result by Portillo Oquendo MD (08/14/24 04:49:46)                   Impression:      Near anatomic alignment of complex mildly displaced distal tibia fracture and mildly displaced distal fibular fracture.    Electronically signed by resident: Hira Patel  Date:    08/14/2024  Time:    03:56    Electronically signed by: Portillo Oquendo MD  Date:    08/14/2024  Time:    04:49               Narrative:    EXAMINATION:  CT ANKLE (INCLUDING HINDFOOT) WITHOUT CONTRAST RIGHT; CT 3D RENDERING WO INDEPENDENT WORKSTATION    CLINICAL HISTORY:  Fracture, ankle;; Fracture, ankle;per order request;    TECHNIQUE:  Axial 0.625-mm images of the right ankle obtained without intravenous contrast.  Data submitted for coronal and sagittal reformats.  3D reconstructed images were acquired by post processing on an independent workstation with concurrent supervision and archived for permanent record. 3D imaging of the right ankle was obtained for further evaluation and operative planning if needed.    COMPARISON:  Ankle radiograph 03/14/2024.    FINDINGS:  Bones are demineralized.  There is a cast in place.  There is mildly displaced comminuted distal tibial fracture and a minimally displaced distal fibular fracture with medial angulation of the fracture fragment.  Intra-articular extension fracture fragments which are mildly displaced involving the distal tibia near anatomic alignment of fracture fragments.  Fractures involve the posterior and medial malleoli.  No dislocation.  There are a few foci of subcutaneous gas overlying the tibial fracture, possibly relating to trauma or reduction procedure though correlation is advised.  Dense calcific tendinosis of the posterior tibial tendon.  No full-thickness injury of the Achilles tendon.  Soft tissue edema about the ankle.  Prominent vascular calcifications noted.                                       X-Ray Ankle Complete Right (Final  result)  Result time 08/14/24 03:44:09      Final result by Portillo Oquendo MD (08/14/24 03:44:09)                   Impression:      Similar alignment of distal tibia and distal fibula fractures post reduction.      Electronically signed by: Portillo Oquendo MD  Date:    08/14/2024  Time:    03:44               Narrative:    EXAMINATION:  XR ANKLE COMPLETE 3 VIEW RIGHT    CLINICAL HISTORY:  Post reduction;    TECHNIQUE:  AP, lateral, and oblique images of the right ankle were performed.    COMPARISON:  08/13/2024.    FINDINGS:  Exam quality is limited by suboptimal positioning and overlapping cast material.  Acute fractures of the distal tibia and distal fibula as seen previously.  Bones are demineralized.  No gross dislocation or significant worsening alignment of fracture fragments.  Soft tissue edema.                                       CT Pelvis Without Contrast (Final result)  Result time 08/13/24 20:58:12      Final result by Quiana Chawla MD (08/13/24 20:58:12)                   Impression:      Acute traumatic fractures involving the right iliac wing, the anterior pillar of the right acetabulum and the lateral most aspect of the right superior ramus, and the right inferior pubic ramus.  No hip dislocation.      Electronically signed by: Quiana Chawla  Date:    08/13/2024  Time:    20:58               Narrative:    EXAMINATION:  CT PELVIS WITHOUT CONTRAST    CLINICAL HISTORY:  Pelvic trauma;    TECHNIQUE:  1.25 mm axial images were obtained through the pelvis from above the iliac crest through the upper femoral shafts.    COMPARISON:  Plain hip radiograph 08/13/2020 full    FINDINGS:  There is a minimally displaced fracture of the right iliac wing.  There are comminuted fractures involving the anterior pillar of the right acetabulum and the lateral most aspect of the right superior ramus.  There is comminuted and overlapping fracture of the right inferior pubic ramus.  The hips are not  dislocated.  The SI joints are intact.  There are degenerative changes seen at the sacroiliac joints and the pubic symphysis.  Bones are osteopenic.  Incidental note is made of vascular calcifications and a small fat containing umbilical hernia.                                       CT Lumbar Spine Without Contrast (Final result)  Result time 08/13/24 20:31:47      Final result by Quiana Chawla MD (08/13/24 20:31:47)                   Impression:      No acute lumbar fracture.  Multilevel degenerative change greatest at L4/L5.    Small bilateral pleural effusions right greater than left.    Gallbladder sludge or gravel-like gallstones.      Electronically signed by: Quiana Chawla  Date:    08/13/2024  Time:    20:31               Narrative:    EXAMINATION:  CT OF THE LUMBAR SPINE WITHOUT    CLINICAL HISTORY:  Complaining of right hip pain secondary to fall from standing.    TECHNIQUE:  1.25 mm axial images were obtained through the lumbar spine. Coronal and sagittal reformatted images were provided.    COMPARISON:  None.    FINDINGS:  There is no lumbar vertebral body fracture.  There is grade 1 anterolisthesis of L4 on L5.  At L3/L4, there is moderate central canal narrowing secondary to the facet arthrosis and ligamentum flavum hypertrophy without significant foraminal stenosis.  At L4/L5, there is no stone significant central canal narrowing, but there is bilateral mild foraminal narrowing.  Secondary to ligamentum flavum hypertrophy and facet arthrosis.  At L5/S1, there is a broad-based disc bulge without any significant central canal stenosis or foraminal stenosis.  There is small marginal osteophytes throughout.  Vacuum phenomena is seen at L4/L5 with mild intervertebral disc space narrowing.  Incidental note is made of small bilateral pleural effusions right greater than left and gallbladder sludge or gravel-like gallstones.                                       X-Ray Chest AP Portable (Final result)   Result time 08/13/24 17:49:32      Final result by Eddie Montilla MD (08/13/24 17:49:32)                   Impression:      1. Interstitial findings may reflect edema versus chronic change, no large focal consolidation.      Electronically signed by: Eddie Montilla MD  Date:    08/13/2024  Time:    17:49               Narrative:    EXAMINATION:  XR CHEST AP PORTABLE    CLINICAL HISTORY:  Chest Pain;    TECHNIQUE:  Single frontal view of the chest was performed.    COMPARISON:  11/03/2023    FINDINGS:  The cardiomediastinal silhouette is prominent, magnified by technique, similar to the previous exam noting calcification of the aorta..  There is no pleural effusion.  The trachea is midline.  The lungs are symmetrically expanded bilaterally with coarse interstitial attenuation in a central hilar distribution..  No large focal consolidation seen.  There is no pneumothorax.  The osseous structures are remarkable for degenerative changes..                                       X-Ray Ankle Complete Right (Final result)  Result time 08/13/24 17:50:39      Final result by Eddie Montilla MD (08/13/24 17:50:39)                   Impression:      1. Distal tibial and fibular fractures as described.      Electronically signed by: Eddie Montilla MD  Date:    08/13/2024  Time:    17:50               Narrative:    EXAMINATION:  XR ANKLE COMPLETE 3 VIEW RIGHT    CLINICAL HISTORY:  Unspecified fall, initial encounter    TECHNIQUE:  AP, lateral, and oblique images of the right ankle were performed.    COMPARISON:  Radiograph of the foot 06/01/2021    FINDINGS:  Three views right ankle.    There is osteopenia.  There is acute appearing fracture involving the distal aspect of the fibula.  There is comminuted fracture of the medial malleolus.  The ankle mortise is intact.  There is edema about the ankle.  The posterior aspect of the tibia appears intact.  There are degenerative changes of the calcaneus.  There is vascular  calcification.                                       X-Ray Hip 2 or 3 views Right with Pelvis when performed (Final result)  Result time 08/13/24 17:53:13      Final result by Eddie Montilla MD (08/13/24 17:53:13)                   Impression:      1. Fractures of the right inferior and superior pubic rami.  2. There is cortical irregularity involving the right iliac wing, concerning for additional fracture.      Electronically signed by: Eddie Montilla MD  Date:    08/13/2024  Time:    17:53               Narrative:    EXAMINATION:  XR HIP WITH PELVIS WHEN PERFORMED 2 OR 3 VIEWS RIGHT    CLINICAL HISTORY:  Unspecified fall, initial encounter    TECHNIQUE:  AP view of the pelvis and frog leg lateral view of the right hip were performed.    COMPARISON:  None    FINDINGS:  Three views right hip.    The bilateral sacroiliac joints are intact.  The pubic symphysis is intact.  There is osteopenia.  There are fractures of the right superior and inferior pubic rami.  The bilateral femoroacetabular joints are intact noting degenerative change.  There is fracture involving the right iliac wing.                                       X-Ray Knee 1 or 2 View Right (Final result)  Result time 08/13/24 17:53:54   Procedure changed from X-Ray Knee 3 View Right     Final result by Eddie Montilla MD (08/13/24 17:53:54)                   Impression:      1. No acute displaced fracture or dislocation of the knee.      Electronically signed by: Eddie Montilla MD  Date:    08/13/2024  Time:    17:53               Narrative:    EXAMINATION:  XR KNEE 1 OR 2 VIEW RIGHT    CLINICAL HISTORY:  Pain;  Unspecified fall, initial encounter    TECHNIQUE:  AP and lateral views of the right knee were performed.    COMPARISON:  11/20/2014    FINDINGS:  Two views right knee.    There is osteopenia.  There are degenerative changes of the knee.  There is vascular calcification.  No large right knee joint effusion.                                         Assessment/Plan:      * Closed right pilon fracture  Fall with ankle fx  -transferred here for ortho  -s/p Open reduction internal fixation right ankle pilon fracture with fixation of tibia and fibula on 8/14. NWB for 10 weeks  Per ortho:    We will remove splint, change dressing, and place a well-padded short-leg fiberglass cast prior to hospital discharge.  After incision healed, and sutures removed, likely 3 weeks postop, consider placement into a cam boot versus continuing the cast, depending on patient preference, inability to place an remove Cam boot        X-rays at subsequent followups:  Right ankle  Pelvis Judet     Follow-up postop  2-3 weeks - remove cast, remove sutures, consider short-leg cast versus Cam boot, based on patient's preference  6 weeks, 3 months, 6 months, 1 year    -pain control with multimodals, bowel regimen  -PT/OT  -eliquis for dvt ppx now post op from pelvic surgery  for 10 weeks while NWB- oct 26 end date      Hypoalbuminemia  Start boost glucose control      Hypocalcemia  Hypocalcemia is likely due to  secondary to poor intake and with low albumin lower than true result, with corrected is 8.4 and still low so start replacement . The most recent calcium and albumin results listed below.  Recent Labs     08/15/24  0457 08/16/24  0456 08/17/24  0430   CALCIUM 8.0* 8.0* 6.2*   ALBUMIN 2.2* 2.0* 1.3*     Plan  - Will replace calcium and monitor electrolytes closely.  - The patient's hypocalcemia is stable  - replace        Metabolic acidosis  Na bicarb started, chronic from ckd and likely will need long term      Tremor  Suspect possible from gabapentin as exam with some spotnaneous myoclonic jerking and hyperreflexic on exam and has been intermittent and unclear of timing, has seen neuro in clinic and had parkinsons ruled out she said but no further work up into cause  -hold gabapentin now, as variable cr may have worsened symptoms if related to this and see if improves,  may take 48 hours to improve if so  Still present but worse Cr so may take more time to clear gabapentin if from this        Acute blood loss anemia  Anemia is likely due to acute blood loss which was from surgery . Most recent hemoglobin and hematocrit are listed below.  Recent Labs     08/16/24  0456 08/16/24  1005 08/16/24  1522 08/16/24  1924 08/17/24  0430   HGB 8.2*  --   --  8.9* 6.8*   HCT 26.7*   < > 24* 27.7* 20.9*    < > = values in this interval not displayed.       Plan  - Monitor serial CBC: Daily  - Transfuse PRBC if patient becomes hemodynamically unstable, symptomatic or H/H drops below 7/21.  - Patient has received 2 units of PRBCs on 8/17  - Patient's anemia is currently  downtrend post op  Expected with large surgery      Transaminitis  - AST/ ALT mildly elevated  - hold statin  -improving now and normal, can resume statin in 1-2 days    Multiple fractures of pelvis  Closed fracture of right iliac wing  Fracture of acetabulum  - AF, VSS  - Xray Hip showed fractures of the right inferior and superior pubic rami   - CT Pelvis showed acute traumatic fractures involving the right iliac wing, the anterior pillar of the right acetabulum and the lateral most aspect of the right superior ramus, and the right inferior pubic ramus   - Ortho consulted- and OR 8/16 with jade with Open reduction internal fixation right both column acetabulum fracture . Multimodal plan control, eliquis started post op for dvt ppx- ortho recs for 10 weeks post op- October 26th end date  -10 weeks NWB to RLE given ankle fx also present now  -PT/OT      Acute cystitis  Urine from [facility] resulted 8/16 with E. Faecalis and ecoli late so was not initially on treatment, may be cause for continued confusion and not totally herself since admit her daughter has reported, start vanc for e faecalis and rocephin for ecoli as its resistant to ampicilin so can't use that to cover both unfortunately  Plan for at least 3 day course,  until 8/20 at least.      KYA (acute kidney injury)  KYA is likely due to pre-renal azotemia due to intravascular volume depletion. Baseline creatinine is  1.6 to 1.8 . Most recent creatinine and eGFR are listed below.  Recent Labs     08/15/24  0457 08/16/24  0456 08/17/24  0430   CREATININE 2.2* 2.4* 1.7*   EGFRNORACEVR 23.1* 20.8* 31.5*        Plan  - KYA is improving Cr 1.7 today and in baseine ranges after IVF down from 2.4  - Avoid nephrotoxins and renally dose meds for GFR listed above  - Monitor urine output, serial BMP, and adjust therapy as needed    -     Chronic diastolic heart failure  - euvolemic on exam  -   - CXR showed interstitial findings may reflect edema versus chronic change, no pleural effusion  - pt is not on a diuretic at this time  - continue to monitor volume status closely  Results for orders placed during the hospital encounter of 11/03/23    Echo    Interpretation Summary    Left Ventricle: The left ventricle is normal in size. Increased wall thickness. Moderate septal thickening. Normal wall motion. There is normal systolic function with a visually estimated ejection fraction of 65 - 70%. Grade I diastolic dysfunction.    Right Ventricle: Normal right ventricular cavity size. Systolic function is normal.    Left Atrium: Left atrium is severely dilated.    Aortic Valve: There is moderate stenosis. Aortic valve area by VTI is 1.02 cm². Aortic valve peak velocity is 2.59 m/s. Mean gradient is 18 mmHg. The dimensionless index is 0.32.    Mitral Valve: There is mild regurgitation.    Tricuspid Valve: There is mild regurgitation.    Pulmonary Artery: The estimated pulmonary artery systolic pressure is 35 mmHg.    IVC/SVC: Normal venous pressure at 3 mmHg.        Stage 3a chronic kidney disease  Creatine stable for now. BMP reviewed- noted Estimated Creatinine Clearance: 33.6 mL/min (A) (based on SCr of 1.7 mg/dL (H)). according to latest data. Based on current GFR, CKD stage is stage  4 - GFR 15-29.  Monitor UOP and serial BMP and adjust therapy as needed. Renally dose meds. Avoid nephrotoxic medications and procedures.  - Cr 1.9 (near most recent baseline which is 1.6 to 1.8) on admit but 2.2 now so will do light IVF on 8/15 but slightly worse at 2.4 now with bobbi and closer to baseline 8/17 at 1.7  - daily BMP  - avoid nephrotoxic medications    Hypomagnesemia  Patient has Abnormal Magnesium: hypomagnesemia. Will continue to monitor electrolytes closely. Will replace the affected electrolytes and repeat labs to be done after interventions completed. The patient's magnesium results have been reviewed and are listed below.  Recent Labs   Lab 08/15/24  0457   MG 2.2        Coronary artery disease of native artery of native heart with stable angina pectoris  Patient with known CAD which is controlled Will continue Statin and monitor for S/Sx of angina/ACS. Continue to monitor on telemetry.   Had stents placed in 2018 to rca and 2020 to 2 areas [facility], had stress in 2022 that was negative for ischemia. She said he wanted to recheck another one recently but insurance did not cover. She said had aspirin allergy testing before it was restarted due to prev intolerance after a stent. Resume asa pending op plans.    Type 2 diabetes mellitus with stage 3 chronic kidney disease, with long-term current use of insulin  Hard to titrate at baseline and had to have doctors adjust a lot, runs very high at times up to 1100 she said once even.   Titrate 8/15, running higher as didn't get long acting yesterday and adjusted today and will adjsut further. Has some allergies to some insulins. On tresiba at home.  - low dose SSI  - diabetic diet  - POCT checks  Baseline runs very high per dr schmidt notes she sees with endocrine outpatient. Baseline average 253 glucose on dexcom he reports. Dexcom removed for OR. On drip in OR wasn't given llantus this AM though. Will incease dose to giev now in  pacu and inc to moderate sliding scale and inc novolog and will likely titrate here as not contolled at home.  Glucose in 100s on 8/17 after 28 U lantus last night and moderate slidign scale. Isnt eating much on ful liquids now, so hold novolog unless glucose is >160 with meals and eating at least half of meal if is in 100s but she tends to run higher even when NPo yesterday was 300s in pacu so titrate further based on trending today and will watch closely. Has had no hypoglycemia on dexcom per above.    Mixed hyperlipidemia  - continue home statin    Primary hypertension  - Chronic, controlled  - continue home imdur and metoprolol    Anxiety  - hx noted  - continue home fluoxetine   High anxiety at baseline per her and daugher, doctors have been hesitant to do acute anxiety meds due to fall risk they said, was not on on admit, high anxiety 8/15, will try ativan x 1 as suspect having possible panic attack after therapy sesion based on nusring description and did better after this  Very anxious in pacu and anesthesia bedside, giving precedex x 1 to relex,and required zyprexa on 8/16 PM and restraints to calm down as confused and anxious.  Will give ativan once as showing signs 8/17 of panic with chest pressure and tingling.      VTE Risk Mitigation (From admission, onward)           Ordered     apixaban tablet 2.5 mg  2 times daily         08/17/24 0746     Reason for No Pharmacological VTE Prophylaxis  Once        Question:  Reasons:  Answer:  Risk of Bleeding    08/14/24 0120     IP VTE HIGH RISK PATIENT  Once         08/14/24 0120                    Discharge Planning   MIKHAIL: 8/19/2024     Code Status: Full Code   Is the patient medically ready for discharge?:     Reason for patient still in hospital (select all that apply): Patient trending condition  Discharge Plan A: Skilled Nursing Facility                  Rupal Ochoa MD  Department of Hospital Medicine   [facility] - Surgery

## 2024-08-17 NOTE — ASSESSMENT & PLAN NOTE
Hypocalcemia is likely due to  secondary to poor intake and with low albumin lower than true result, with corrected is 8.4 and still low so start replacement . The most recent calcium and albumin results listed below.  Recent Labs     08/15/24  0457 08/16/24  0456 08/17/24  0430   CALCIUM 8.0* 8.0* 6.2*   ALBUMIN 2.2* 2.0* 1.3*     Plan  - Will replace calcium and monitor electrolytes closely.  - The patient's hypocalcemia is stable  - replace

## 2024-08-17 NOTE — ASSESSMENT & PLAN NOTE
Urine from [facility] resulted 8/16 with E. Faecalis and ecoli late so was not initially on treatment, may be cause for continued confusion and not totally herself since admit her daughter has reported, start vanc for e faecalis and rocephin for ecoli as its resistant to ampicilin so can't use that to cover both unfortunately  Plan for at least 3 day course, until 8/20 at least.

## 2024-08-17 NOTE — ASSESSMENT & PLAN NOTE
Hard to titrate at baseline and had to have doctors adjust a lot, runs very high at times up to 1100 she said once even.   Titrate 8/15, running higher as didn't get long acting yesterday and adjusted today and will adjsut further. Has some allergies to some insulins. On tresiba at home.  - low dose SSI  - diabetic diet  - POCT checks  Baseline runs very high per dr schmidt notes she sees with endocrine outpatient. Baseline average 253 glucose on dexcom he reports. Dexcom removed for OR. On drip in OR wasn't given llantus this AM though. Will incease dose to giev now in pacu and inc to moderate sliding scale and inc novolog and will likely titrate here as not contolled at home.  Glucose in 100s on 8/17 after 28 U lantus last night and moderate slidign scale. Isnt eating much on ful liquids now, so hold novolog unless glucose is >160 with meals and eating at least half of meal if is in 100s but she tends to run higher even when NPo yesterday was 300s in pacu so titrate further based on trending today and will watch closely. Has had no hypoglycemia on dexcom per above.

## 2024-08-17 NOTE — ASSESSMENT & PLAN NOTE
Fall with ankle fx  -transferred here for ortho  -s/p Open reduction internal fixation right ankle pilon fracture with fixation of tibia and fibula on 8/14. NWB for 10 weeks  Per ortho:    We will remove splint, change dressing, and place a well-padded short-leg fiberglass cast prior to hospital discharge.  After incision healed, and sutures removed, likely 3 weeks postop, consider placement into a cam boot versus continuing the cast, depending on patient preference, inability to place an remove Cam boot        X-rays at subsequent followups:  Right ankle  Pelvis Judet     Follow-up postop  2-3 weeks - remove cast, remove sutures, consider short-leg cast versus Cam boot, based on patient's preference  6 weeks, 3 months, 6 months, 1 year    -pain control with multimodals, bowel regimen  -PT/OT  -eliquis for dvt ppx now post op from pelvic surgery  for 10 weeks while NWB- oct 26 end date

## 2024-08-17 NOTE — ASSESSMENT & PLAN NOTE
Closed fracture of right iliac wing  Fracture of acetabulum  - AF, VSS  - Xray Hip showed fractures of the right inferior and superior pubic rami   - CT Pelvis showed acute traumatic fractures involving the right iliac wing, the anterior pillar of the right acetabulum and the lateral most aspect of the right superior ramus, and the right inferior pubic ramus   - Ortho consulted- and OR 8/16 with jade with Open reduction internal fixation right both column acetabulum fracture . Multimodal plan control, eliquis started post op for dvt ppx- ortho recs for 10 weeks post op- October 26th end date  -10 weeks NWB to RLE given ankle fx also present now  -PT/OT

## 2024-08-18 PROBLEM — R41.0 DELIRIUM: Status: ACTIVE | Noted: 2024-08-18

## 2024-08-18 PROBLEM — K59.00 CONSTIPATION: Status: ACTIVE | Noted: 2024-08-18

## 2024-08-18 PROBLEM — R10.9 ABDOMINAL PAIN: Status: ACTIVE | Noted: 2024-08-18

## 2024-08-18 LAB
ALBUMIN SERPL BCP-MCNC: 1.9 G/DL (ref 3.5–5.2)
ALP SERPL-CCNC: 159 U/L (ref 55–135)
ALT SERPL W/O P-5'-P-CCNC: 9 U/L (ref 10–44)
ANION GAP SERPL CALC-SCNC: 10 MMOL/L (ref 8–16)
AST SERPL-CCNC: 50 U/L (ref 10–40)
BASOPHILS # BLD AUTO: 0.06 K/UL (ref 0–0.2)
BASOPHILS NFR BLD: 0.6 % (ref 0–1.9)
BILIRUB SERPL-MCNC: 0.5 MG/DL (ref 0.1–1)
BUN SERPL-MCNC: 42 MG/DL (ref 8–23)
CALCIUM SERPL-MCNC: 8.5 MG/DL (ref 8.7–10.5)
CHLORIDE SERPL-SCNC: 105 MMOL/L (ref 95–110)
CO2 SERPL-SCNC: 20 MMOL/L (ref 23–29)
CREAT SERPL-MCNC: 1.9 MG/DL (ref 0.5–1.4)
DIFFERENTIAL METHOD BLD: ABNORMAL
EOSINOPHIL # BLD AUTO: 0.1 K/UL (ref 0–0.5)
EOSINOPHIL NFR BLD: 1 % (ref 0–8)
ERYTHROCYTE [DISTWIDTH] IN BLOOD BY AUTOMATED COUNT: 15.8 % (ref 11.5–14.5)
EST. GFR  (NO RACE VARIABLE): 27.5 ML/MIN/1.73 M^2
GLUCOSE SERPL-MCNC: 190 MG/DL (ref 70–110)
HCT VFR BLD AUTO: 32.4 % (ref 37–48.5)
HGB BLD-MCNC: 10.8 G/DL (ref 12–16)
IMM GRANULOCYTES # BLD AUTO: 0.06 K/UL (ref 0–0.04)
IMM GRANULOCYTES NFR BLD AUTO: 0.6 % (ref 0–0.5)
LYMPHOCYTES # BLD AUTO: 0.9 K/UL (ref 1–4.8)
LYMPHOCYTES NFR BLD: 8.2 % (ref 18–48)
MAGNESIUM SERPL-MCNC: 1.9 MG/DL (ref 1.6–2.6)
MCH RBC QN AUTO: 29.2 PG (ref 27–31)
MCHC RBC AUTO-ENTMCNC: 33.3 G/DL (ref 32–36)
MCV RBC AUTO: 88 FL (ref 82–98)
MONOCYTES # BLD AUTO: 1.1 K/UL (ref 0.3–1)
MONOCYTES NFR BLD: 10.5 % (ref 4–15)
NEUTROPHILS # BLD AUTO: 8.3 K/UL (ref 1.8–7.7)
NEUTROPHILS NFR BLD: 79.1 % (ref 38–73)
NRBC BLD-RTO: 0 /100 WBC
PHOSPHATE SERPL-MCNC: 3.6 MG/DL (ref 2.7–4.5)
PLATELET # BLD AUTO: 178 K/UL (ref 150–450)
PMV BLD AUTO: 13.1 FL (ref 9.2–12.9)
POCT GLUCOSE: 107 MG/DL (ref 70–110)
POCT GLUCOSE: 136 MG/DL (ref 70–110)
POCT GLUCOSE: 208 MG/DL (ref 70–110)
POCT GLUCOSE: 281 MG/DL (ref 70–110)
POTASSIUM SERPL-SCNC: 4.9 MMOL/L (ref 3.5–5.1)
PROT SERPL-MCNC: 6.1 G/DL (ref 6–8.4)
RBC # BLD AUTO: 3.7 M/UL (ref 4–5.4)
SODIUM SERPL-SCNC: 135 MMOL/L (ref 136–145)
VANCOMYCIN SERPL-MCNC: 18.6 UG/ML
VANCOMYCIN SERPL-MCNC: 20.8 UG/ML
WBC # BLD AUTO: 10.48 K/UL (ref 3.9–12.7)

## 2024-08-18 PROCEDURE — 25000003 PHARM REV CODE 250: Mod: HCNC

## 2024-08-18 PROCEDURE — 21400001 HC TELEMETRY ROOM: Mod: HCNC

## 2024-08-18 PROCEDURE — 85025 COMPLETE CBC W/AUTO DIFF WBC: CPT | Mod: HCNC | Performed by: HOSPITALIST

## 2024-08-18 PROCEDURE — 83735 ASSAY OF MAGNESIUM: CPT | Mod: HCNC | Performed by: HOSPITALIST

## 2024-08-18 PROCEDURE — 93005 ELECTROCARDIOGRAM TRACING: CPT | Mod: HCNC

## 2024-08-18 PROCEDURE — 97530 THERAPEUTIC ACTIVITIES: CPT | Mod: HCNC

## 2024-08-18 PROCEDURE — 97168 OT RE-EVAL EST PLAN CARE: CPT | Mod: HCNC

## 2024-08-18 PROCEDURE — 80202 ASSAY OF VANCOMYCIN: CPT | Mod: 91,HCNC | Performed by: HOSPITALIST

## 2024-08-18 PROCEDURE — 25000003 PHARM REV CODE 250: Mod: HCNC | Performed by: INTERNAL MEDICINE

## 2024-08-18 PROCEDURE — 99223 1ST HOSP IP/OBS HIGH 75: CPT | Mod: HCNC,GC,, | Performed by: PSYCHIATRY & NEUROLOGY

## 2024-08-18 PROCEDURE — 25000003 PHARM REV CODE 250: Mod: HCNC | Performed by: HOSPITALIST

## 2024-08-18 PROCEDURE — 97535 SELF CARE MNGMENT TRAINING: CPT | Mod: HCNC

## 2024-08-18 PROCEDURE — 90833 PSYTX W PT W E/M 30 MIN: CPT | Mod: HCNC,GC,, | Performed by: PSYCHIATRY & NEUROLOGY

## 2024-08-18 PROCEDURE — 84100 ASSAY OF PHOSPHORUS: CPT | Mod: HCNC | Performed by: HOSPITALIST

## 2024-08-18 PROCEDURE — 63600175 PHARM REV CODE 636 W HCPCS: Mod: HCNC | Performed by: HOSPITALIST

## 2024-08-18 PROCEDURE — 80053 COMPREHEN METABOLIC PANEL: CPT | Mod: HCNC | Performed by: HOSPITALIST

## 2024-08-18 PROCEDURE — 36415 COLL VENOUS BLD VENIPUNCTURE: CPT | Mod: HCNC | Performed by: HOSPITALIST

## 2024-08-18 PROCEDURE — 93010 ELECTROCARDIOGRAM REPORT: CPT | Mod: HCNC,,, | Performed by: INTERNAL MEDICINE

## 2024-08-18 PROCEDURE — 97164 PT RE-EVAL EST PLAN CARE: CPT | Mod: HCNC

## 2024-08-18 RX ORDER — TALC
6 POWDER (GRAM) TOPICAL NIGHTLY
Status: DISCONTINUED | OUTPATIENT
Start: 2024-08-18 | End: 2024-08-21 | Stop reason: HOSPADM

## 2024-08-18 RX ORDER — ALUMINUM HYDROXIDE, MAGNESIUM HYDROXIDE, AND SIMETHICONE 2400; 240; 2400 MG/30ML; MG/30ML; MG/30ML
30 SUSPENSION ORAL EVERY 6 HOURS
Status: DISCONTINUED | OUTPATIENT
Start: 2024-08-18 | End: 2024-08-21 | Stop reason: HOSPADM

## 2024-08-18 RX ORDER — QUETIAPINE FUMARATE 25 MG/1
50 TABLET, FILM COATED ORAL NIGHTLY
Status: DISCONTINUED | OUTPATIENT
Start: 2024-08-18 | End: 2024-08-21 | Stop reason: HOSPADM

## 2024-08-18 RX ORDER — CALCIUM CARBONATE 200(500)MG
500 TABLET,CHEWABLE ORAL 3 TIMES DAILY PRN
Status: DISCONTINUED | OUTPATIENT
Start: 2024-08-18 | End: 2024-08-21 | Stop reason: HOSPADM

## 2024-08-18 RX ORDER — BISACODYL 10 MG/1
10 SUPPOSITORY RECTAL ONCE
Status: DISCONTINUED | OUTPATIENT
Start: 2024-08-18 | End: 2024-08-21 | Stop reason: HOSPADM

## 2024-08-18 RX ORDER — ISOSORBIDE MONONITRATE 30 MG/1
60 TABLET, EXTENDED RELEASE ORAL DAILY
Status: DISCONTINUED | OUTPATIENT
Start: 2024-08-18 | End: 2024-08-21 | Stop reason: HOSPADM

## 2024-08-18 RX ADMIN — APIXABAN 2.5 MG: 2.5 TABLET, FILM COATED ORAL at 09:08

## 2024-08-18 RX ADMIN — ACETAMINOPHEN 1000 MG: 325 TABLET ORAL at 09:08

## 2024-08-18 RX ADMIN — METOPROLOL SUCCINATE 25 MG: 25 TABLET, EXTENDED RELEASE ORAL at 09:08

## 2024-08-18 RX ADMIN — FLUOXETINE HYDROCHLORIDE 40 MG: 20 CAPSULE ORAL at 09:08

## 2024-08-18 RX ADMIN — DEXTROSE MONOHYDRATE 250 MG: 50 INJECTION, SOLUTION INTRAVENOUS at 10:08

## 2024-08-18 RX ADMIN — SODIUM BICARBONATE 650 MG TABLET 650 MG: at 09:08

## 2024-08-18 RX ADMIN — CEFTRIAXONE 1 G: 1 INJECTION, POWDER, FOR SOLUTION INTRAMUSCULAR; INTRAVENOUS at 01:08

## 2024-08-18 RX ADMIN — INSULIN ASPART 13 UNITS: 100 INJECTION, SOLUTION INTRAVENOUS; SUBCUTANEOUS at 01:08

## 2024-08-18 RX ADMIN — INSULIN ASPART 4 UNITS: 100 INJECTION, SOLUTION INTRAVENOUS; SUBCUTANEOUS at 10:08

## 2024-08-18 RX ADMIN — ALUMINUM HYDROXIDE, MAGNESIUM HYDROXIDE, SIMETHICONE 30 ML: 400; 400; 40 SUSPENSION ORAL at 06:08

## 2024-08-18 RX ADMIN — INSULIN GLARGINE 28 UNITS: 100 INJECTION, SOLUTION SUBCUTANEOUS at 09:08

## 2024-08-18 RX ADMIN — ISOSORBIDE MONONITRATE 60 MG: 30 TABLET, EXTENDED RELEASE ORAL at 09:08

## 2024-08-18 RX ADMIN — METHOCARBAMOL 500 MG: 500 TABLET ORAL at 09:08

## 2024-08-18 RX ADMIN — PANTOPRAZOLE SODIUM 40 MG: 40 TABLET, DELAYED RELEASE ORAL at 09:08

## 2024-08-18 RX ADMIN — METHOCARBAMOL 500 MG: 500 TABLET ORAL at 01:08

## 2024-08-18 RX ADMIN — CALCIUM CARBONATE (ANTACID) CHEW TAB 500 MG 1000 MG: 500 CHEW TAB at 09:08

## 2024-08-18 RX ADMIN — ALUMINUM HYDROXIDE, MAGNESIUM HYDROXIDE, SIMETHICONE 30 ML: 400; 400; 40 SUSPENSION ORAL at 12:08

## 2024-08-18 RX ADMIN — QUETIAPINE FUMARATE 50 MG: 25 TABLET ORAL at 09:08

## 2024-08-18 RX ADMIN — METHOCARBAMOL 500 MG: 500 TABLET ORAL at 06:08

## 2024-08-18 RX ADMIN — Medication 6 MG: at 09:08

## 2024-08-18 RX ADMIN — ACETAMINOPHEN 1000 MG: 325 TABLET ORAL at 01:08

## 2024-08-18 RX ADMIN — ALUMINUM HYDROXIDE, MAGNESIUM HYDROXIDE, SIMETHICONE 30 ML: 400; 400; 40 SUSPENSION ORAL at 11:08

## 2024-08-18 RX ADMIN — ACETAMINOPHEN 1000 MG: 325 TABLET ORAL at 05:08

## 2024-08-18 RX ADMIN — DEXTROSE MONOHYDRATE 250 MG: 50 INJECTION, SOLUTION INTRAVENOUS at 03:08

## 2024-08-18 RX ADMIN — OXYCODONE HYDROCHLORIDE 10 MG: 10 TABLET ORAL at 09:08

## 2024-08-18 NOTE — ASSESSMENT & PLAN NOTE
Hard to titrate at baseline and had to have doctors adjust a lot, runs very high at times up to 1100 she said once even.   Titrate 8/15, running higher as didn't get long acting yesterday and adjusted today and will adjsut further. Has some allergies to some insulins. On tresiba at home.  - low dose SSI  - diabetic diet  - POCT checks  Baseline runs very high per dr schmidt notes she sees with endocrine outpatient. Baseline average 253 glucose on dexcom he reports. Dexcom removed for OR. On drip in OR wasn't given llantus this AM though. Will incease dose to giev now in pacu and inc to moderate sliding scale and inc novolog and will likely titrate here as not contolled at home.  Glucose in 100s on 8/17 after 28 U lantus last night and moderate slidign scale. Isnt eating much on ful liquids now, so hold novolog unless glucose is >160 with meals and eating at least half of meal if is in 100s but she tends to run higher even when NPo yesterday was 300s in pacu so titrate further based on trending today and will watch closely. Has had no hypoglycemia on dexcom per above.  Glucose better but not eating, just starting to eat 8/18 so will likely need to titrate more

## 2024-08-18 NOTE — ASSESSMENT & PLAN NOTE
Present immediately post op in pacu and has been on/off with severe anxiety but now is full delirium with hallucinating and talking to the wall and seeing people/babies. Has UTIS on treatment but hasn't slept in over 48 hours and PRNS not improving with probably worsening delirium the last 24 hours, minimal responsive to zyprexa x2, iv benadryl once, and small dose ativan on 7/17 to try to calm and help sleep.  Psych consulted 8/18 to help with PRNS and sleep to try to get her on meds to promote her to sleep and for agitation until can get back to baseline while treating medical contributors as well

## 2024-08-18 NOTE — ASSESSMENT & PLAN NOTE
Has chronic issues, takes maalox at home, reports having here burning with eating and requests and added scheduled now with tums prn  Constipation also making her feel full, suppository added 8/18.

## 2024-08-18 NOTE — PROGRESS NOTES
[facility] Medicine  Progress Note    Patient Name: Lorena Contreras  MRN: [id number]  Patient Class: IP- Inpatient   Admission Date: 8/13/2024  Length of Stay: 4 days  Attending Physician: Rupal Ochoa MD  Primary Care Provider: Jorge Paris MD        Subjective:     Principal Problem:Closed right pilon fracture        HPI:  Lorena Contreras is 74 y/o with PMHx of DM2, Fibromyalgia, HTN, HLD, CVA, MI, and CAD presents to ED via EMS as a [place] transfer for a fall. Pt was walking into grocery store when she twisted her ankle and fell despite attempts to catch herself. Denies LOC or head trauma. Patient fell on her right side. Had immediate pain to her ankle and hip. Pt was unable to ambulate due to pain. Pain is worse in her ankle. States it has subsided s/p pain medications. Pain has been exacerbated when moving. Had an episode of vomiting while splinting ankle but otherwise no further episodes. Denies fever, chills, chest pain, SOB, abdominal pain and urinary symptoms. Has numbness/ tingling to bilateral feet which she states is chronic 2/2 neuropathy.    In the ED: Afebrile, VSS. H&H 10.9 & 33.2. Cr 1.9. Glucose 348. LFTs AST 50, ALT 45. . Urine and drug toxicology screening pending. Chest Xray impression was interstitial findings may reflect edema versus chronic change, no large focal consolidation. Right Ankle Xray impression was distal tibial and fibular fractures as described. Right Hip Xray impression was fractures of the right inferior and superior pubic rami. There is cortical irregularity involving the right iliac wing, concerning for additional fracture. Right Knee Xray showed  no acute displaced fracture or dislocation of the knee. CT Pelvis showed acute traumatic fractures involving the right iliac wing, the anterior pillar of the right acetabulum and the lateral most aspect of the right superior ramus, and the right inferior pubic ramus. No hip  dislocation. CT Lumbar impression was There is no lumbar vertebral body fracture. There is grade 1 anterolisthesis of L4 on L5. At L3/L4, there is moderate central canal narrowing secondary to the facet arthrosis and ligamentum flavum hypertrophy without significant foraminal stenosis. At L4/L5, there is no stone significant central canal narrowing, but there is bilateral mild foraminal narrowing. Secondary to ligamentum flavum hypertrophy and facet arthrosis. At L5/S1, there is a broad-based disc bulge without any significant central canal stenosis or foraminal stenosis. Pain medications given in the ED. Ortho consulted. Admitted to .        Overview/Hospital Course:  No notes on file    Interval history- she didn't have a good day yesterday, she was agitated and delirious and confused most of day intermittently, restraints removed in AM on exam but had to be replaced mid day as pulled out IV when blood runnign and said she had to get all of the blood out of her in confusion. Had somehat of panic sounding attack with tingling/numbness, chest pressure earlier in am and given 1/2 mg ativan with improvement but then deliriom issues above later in day, given xyprea 5 mg with no results, given IV benadryl later in afternoon and nursing reported eyelids heavy but patient was fighting sleep. Around 630 pm was getting very agitated in restraints again and so given zyprexa 10 once. It does not appear she slept much at all, she says she hasn't slept and she is still in similar state, daughters reports possibly worse. On my exam this Am before came back when daughters there to talk to them, she was orienetd at least to situation (she [place] [facility] [place] was hospital, that she had surgery on leg and not arm, she knew her favorite holiday to craft was easter and that she did an  craft last year she had told me about before surgery), so intermittent issues and hallucinating a man in [facility], a  "baby in room. After my exam this Am I could hear her having conversation with the wall repeatedly. She was seeing things in room when I came back to talk to her daughters. She does have 2 UTIS that treatment was started yesterady for, but I suspect a major contributor also is poor sleep and psych consulted to assist with recs for sleep and for PRNS for delirium as meds may be worsening if not on right mesd for PRNS as well to try to get back on sleep cycles. Hgbetter at 10 now after transfusion. She didtn eat at all for almost 2 days, ate some pudding on 2nd exam today and daughters got her to eat some cream of wheat and drink some protein drink this AM. She said her stomach is burning, and asked for maalox and added as well as tums for her. No BM since damit so suppository added and she's agreeable to this as feels "full". Benoit kept in due to being restrained for now. Glucose was lower 200s now but isnt eating so will need to titrate later today given starting to eat more, is on liquisd for now. Bp higher 150s-170s so increased isorbide. She said she has had intermittent palpations since went from atenolol to metoprolol a few years ago and liked that better and discsused why they changed it per cards notes, due to CAD indication for metoprolol.   Will f/u psych recs further, suspect will take some more time to clear mental status and will delay SNF placement as has to also be out of restraints 72 hours and isnt ready to do this yet, restraint order renewed now.             Review of patient's allergies indicates:   Allergen Reactions    Novolin 70/30 (semi-synthetic) Nausea And Vomiting     Severe vomiting on Relion 70/30    Sulfa (sulfonamide antibiotics) Anaphylaxis    Talwin [pentazocine lactate] Anaphylaxis    Victoza [liraglutide] Nausea And Vomiting    Glipizide Nausea Only    Citric acid     Codeine     Influenza virus vaccines Hives    Iodine and iodide containing products Hives    Levetiracetam Itching    " Rituxan [rituximab] Hives    Zoloft [sertraline] Nausea And Vomiting       No current facility-administered medications on file prior to encounter.     Current Outpatient Medications on File Prior to Encounter   Medication Sig    Bacillus coagulans (DIGESTIVE ADVANTAGE IMMUNE ORAL) Take by mouth.    diclofenac sodium (VOLTAREN TOP) Apply topically.    gabapentin (NEURONTIN) 300 MG capsule Take 1 capsule (300 mg total) by mouth 3 (three) times daily.    hydrALAZINE (APRESOLINE) 50 MG tablet Take 1 tablet (50 mg total) by mouth every 8 (eight) hours.    LANTUS SOLOSTAR U-100 INSULIN 100 unit/mL (3 mL) InPn pen Inject 12 Units into the skin every evening.    NOVOLOG FLEXPEN U-100 INSULIN 100 unit/mL (3 mL) InPn pen Inject 20 Units into the skin 3 (three) times daily with meals.    albuterol (PROVENTIL/VENTOLIN HFA) 90 mcg/actuation inhaler Inhale 2 puffs into the lungs every 6 (six) hours as needed for Wheezing or Shortness of Breath.    ASCORBIC ACID, VITAMIN C, ORAL Take by mouth once daily.    aspirin 81 MG Chew Take 1 tablet (81 mg total) by mouth once daily.    atorvastatin (LIPITOR) 80 MG tablet Take 1 tablet by mouth in the evening    cholecalciferol, vitamin D3, (VITAMIN D3) 50 mcg (2,000 unit) Cap Take 2 capsules by mouth 2 (two) times daily.    cyanocobalamin (VITAMIN B-12) 1000 MCG tablet Take 100 mcg by mouth once daily.    DEXCOM G7  Misc Use 1  to track blood glucose, ICD10: E11.65    DEXCOM G7 SENSOR Samina Use 1 sensor every 10 days to track blood glucose, ICD10: E11.65, okay with 90 day supply if possible    EPINEPHrine (EPIPEN) 0.3 mg/0.3 mL AtIn Inject 0.3 mLs (0.3 mg total) into the muscle once. for 1 dose    ESOMEPRAZOLE MAGNESIUM ORAL Take by mouth as needed. (Patient not taking: Reported on 5/28/2024)    FLUoxetine 40 MG capsule Take 1 capsule (40 mg total) by mouth once daily.    isosorbide mononitrate (IMDUR) 30 MG 24 hr tablet Take 1 tablet (30 mg total) by mouth once daily.     "LIDOcaine (LIDODERM) 5 % Place 1 patch onto the skin once daily. Remove & Discard patch within 12 hours or as directed by MD.    LORazepam (ATIVAN) 1 MG tablet Take 1 tablet (1 mg total) by mouth 2 (two) times daily as needed for Anxiety.    magnesium oxide (MAG-OX) 400 mg tablet Take 400 mg by mouth once daily.    melatonin 10 mg Cap Take 10 mg by mouth nightly.    metoprolol succinate (TOPROL-XL) 25 MG 24 hr tablet Take 1 tablet (25 mg total) by mouth once daily.    multivitamin/iron/folic acid (CENTRUM COMPLETE ORAL) Take by mouth.    OZEMPIC 1 mg/dose (4 mg/3 mL) Inject 1 mg into the skin every 7 days.    pantoprazole (PROTONIX) 40 MG tablet Take 1 tablet (40 mg total) by mouth once daily.    pen needle, diabetic (BD ULTRA-FINE SAGAR PEN NEEDLE) 32 gauge x 5/32" Ndle One pen needle use with insulin pen 4 times a day.  ICD-10: E11.9    semaglutide (OZEMPIC) 0.25 mg or 0.5 mg (2 mg/3 mL) pen injector Inject 0.5 mg once weekly thereafter    ticagrelor (BRILINTA) 90 mg tablet Take 1 tablet (90 mg total) by mouth 2 (two) times daily.    vitamin E 1000 UNIT capsule Take 1,000 Units by mouth once daily.     Family History       Problem Relation (Age of Onset)    Allergy (severe) Daughter    Cancer Mother, Father    Diabetes Sister    Heart disease Mother    Hypertension Sister    Lung cancer Brother    No Known Problems Daughter          Tobacco Use    Smoking status: Never    Smokeless tobacco: Never   Substance and Sexual Activity    Alcohol use: Not Currently    Drug use: Never    Sexual activity: Not Currently     Partners: Male     Review of Systems   Constitutional:  Positive for activity change. Negative for chills and fever.   HENT:  Negative for congestion, hearing loss, rhinorrhea and sore throat.    Eyes:  Negative for visual disturbance.   Respiratory:  Negative for shortness of breath.    Cardiovascular:  Negative for chest pain and leg swelling.   Gastrointestinal:  Negative for abdominal pain, " constipation, diarrhea, nausea and vomiting.   Genitourinary:  Negative for dysuria, frequency and urgency.   Musculoskeletal:  Positive for arthralgias and myalgias.   Neurological:  Positive for numbness (chronic).     Objective:     Vital Signs (Most Recent):  Temp: 99.4 °F (37.4 °C) (08/18/24 0744)  Pulse: 93 (08/18/24 0744)  Resp: 16 (08/18/24 0744)  BP: (!) 143/65 (08/18/24 0744)  SpO2: (!) 94 % (08/18/24 0744) Vital Signs (24h Range):  Temp:  [98.4 °F (36.9 °C)-99.4 °F (37.4 °C)] 99.4 °F (37.4 °C)  Pulse:  [81-98] 93  Resp:  [16-20] 16  SpO2:  [91 %-99 %] 94 %  BP: (141-182)/(60-82) 143/65     Weight: 81.2 kg (179 lb 0.2 oz)  Body mass index is 26.44 kg/m².     Physical Exam  Constitutional:       General: She is not in acute distress.     Appearance: Normal appearance. She is not ill-appearing.      Comments: Daughters at bedside. Delirious, hallucinating at times. However is still oriented to general situation and conversive as well at times. Ate a few bites of pudding for daughter. Restraitns in UE.   HENT:      Head: Normocephalic and atraumatic.   Eyes:      Extraocular Movements: Extraocular movements intact.   Cardiovascular:      Rate and Rhythm: Normal rate and regular rhythm.      Heart sounds: Murmur heard.      No friction rub. No gallop.   Pulmonary:      Effort: Pulmonary effort is normal.      Breath sounds: Normal breath sounds. No wheezing, rhonchi or rales.   Abdominal:      General: There is no distension.      Tenderness: There is no abdominal tenderness. There is no guarding.   Musculoskeletal:         General: Tenderness and signs of injury present.      Right lower leg: No edema.      Left lower leg: No edema.      Comments: [place] to right ankle and right LE post op.  TTP over R hip  Sensation diminished to toes 2/2 neuropathy (chronic)   Neurological:      General: No focal deficit present.      Mental Status: She is alert and oriented to person, place, and time.      Comments:  Tremors at times in arms, present on admit. Oriented to where she lives ([facility]), surgery  (leg), favorite holiday ([facility]), place (hospital), but delirious on/off. Hallucinating man, baby. Talking to the wall and having conversation after I left the room and was in hallway could hear this. Not at baselien at all. Restraints in UE.                Significant Labs: All pertinent labs within the past 24 hours have been reviewed.  BMP:   Recent Labs   Lab 08/18/24  0548   *   *   K 4.9      CO2 20*   BUN 42*   CREATININE 1.9*   CALCIUM 8.5*   MG 1.9     CBC:   Recent Labs   Lab 08/16/24  1924 08/17/24  0430 08/18/24  0548   WBC 16.38* 10.36 10.48   HGB 8.9* 6.8* 10.8*   HCT 27.7* 20.9* 32.4*    140* 178       Significant Imaging: I have reviewed all pertinent imaging results/findings within the past 24 hours.    Imaging Results              CT Ankle (Including Hindfoot) Without Contrast Right (Final result)  Result time 08/14/24 04:49:46      Final result by Portillo Oquendo MD (08/14/24 04:49:46)                   Impression:      Near anatomic alignment of complex mildly displaced distal tibia fracture and mildly displaced distal fibular fracture.    Electronically signed by resident: Hira Patel  Date:    08/14/2024  Time:    03:56    Electronically signed by: Portillo Oquendo MD  Date:    08/14/2024  Time:    04:49               Narrative:    EXAMINATION:  CT ANKLE (INCLUDING HINDFOOT) WITHOUT CONTRAST RIGHT; CT 3D RENDERING WO INDEPENDENT WORKSTATION    CLINICAL HISTORY:  Fracture, ankle;; Fracture, ankle;per order request;    TECHNIQUE:  Axial 0.625-mm images of the right ankle obtained without intravenous contrast.  Data submitted for coronal and sagittal reformats.  3D reconstructed images were acquired by post processing on an independent workstation with concurrent supervision and archived for permanent record. 3D imaging of the right ankle was obtained for further evaluation  and operative planning if needed.    COMPARISON:  Ankle radiograph 03/14/2024.    FINDINGS:  Bones are demineralized.  There is a cast in place.  There is mildly displaced comminuted distal tibial fracture and a minimally displaced distal fibular fracture with medial angulation of the fracture fragment.  Intra-articular extension fracture fragments which are mildly displaced involving the distal tibia near anatomic alignment of fracture fragments.  Fractures involve the posterior and medial malleoli.  No dislocation.  There are a few foci of subcutaneous gas overlying the tibial fracture, possibly relating to trauma or reduction procedure though correlation is advised.  Dense calcific tendinosis of the posterior tibial tendon.  No full-thickness injury of the Achilles tendon.  Soft tissue edema about the ankle.  Prominent vascular calcifications noted.                                       CT 3D Rendering WO Independent Workstation (Final result)  Result time 08/14/24 04:49:46      Final result by Portlilo Oquendo MD (08/14/24 04:49:46)                   Impression:      Near anatomic alignment of complex mildly displaced distal tibia fracture and mildly displaced distal fibular fracture.    Electronically signed by resident: Hira Patel  Date:    08/14/2024  Time:    03:56    Electronically signed by: Portillo Oquendo MD  Date:    08/14/2024  Time:    04:49               Narrative:    EXAMINATION:  CT ANKLE (INCLUDING HINDFOOT) WITHOUT CONTRAST RIGHT; CT 3D RENDERING WO INDEPENDENT WORKSTATION    CLINICAL HISTORY:  Fracture, ankle;; Fracture, ankle;per order request;    TECHNIQUE:  Axial 0.625-mm images of the right ankle obtained without intravenous contrast.  Data submitted for coronal and sagittal reformats.  3D reconstructed images were acquired by post processing on an independent workstation with concurrent supervision and archived for permanent record. 3D imaging of the right ankle was obtained for  further evaluation and operative planning if needed.    COMPARISON:  Ankle radiograph 03/14/2024.    FINDINGS:  Bones are demineralized.  There is a cast in place.  There is mildly displaced comminuted distal tibial fracture and a minimally displaced distal fibular fracture with medial angulation of the fracture fragment.  Intra-articular extension fracture fragments which are mildly displaced involving the distal tibia near anatomic alignment of fracture fragments.  Fractures involve the posterior and medial malleoli.  No dislocation.  There are a few foci of subcutaneous gas overlying the tibial fracture, possibly relating to trauma or reduction procedure though correlation is advised.  Dense calcific tendinosis of the posterior tibial tendon.  No full-thickness injury of the Achilles tendon.  Soft tissue edema about the ankle.  Prominent vascular calcifications noted.                                       X-Ray Ankle Complete Right (Final result)  Result time 08/14/24 03:44:09      Final result by Portillo Oquendo MD (08/14/24 03:44:09)                   Impression:      Similar alignment of distal tibia and distal fibula fractures post reduction.      Electronically signed by: Portillo Oquendo MD  Date:    08/14/2024  Time:    03:44               Narrative:    EXAMINATION:  XR ANKLE COMPLETE 3 VIEW RIGHT    CLINICAL HISTORY:  Post reduction;    TECHNIQUE:  AP, lateral, and oblique images of the right ankle were performed.    COMPARISON:  08/13/2024.    FINDINGS:  Exam quality is limited by suboptimal positioning and overlapping cast material.  Acute fractures of the distal tibia and distal fibula as seen previously.  Bones are demineralized.  No gross dislocation or significant worsening alignment of fracture fragments.  Soft tissue edema.                                       CT Pelvis Without Contrast (Final result)  Result time 08/13/24 20:58:12      Final result by Quiana Chawla MD (08/13/24  20:58:12)                   Impression:      Acute traumatic fractures involving the right iliac wing, the anterior pillar of the right acetabulum and the lateral most aspect of the right superior ramus, and the right inferior pubic ramus.  No hip dislocation.      Electronically signed by: Quiana Chawla  Date:    08/13/2024  Time:    20:58               Narrative:    EXAMINATION:  CT PELVIS WITHOUT CONTRAST    CLINICAL HISTORY:  Pelvic trauma;    TECHNIQUE:  1.25 mm axial images were obtained through the pelvis from above the iliac crest through the upper femoral shafts.    COMPARISON:  Plain hip radiograph 08/13/2020 full    FINDINGS:  There is a minimally displaced fracture of the right iliac wing.  There are comminuted fractures involving the anterior pillar of the right acetabulum and the lateral most aspect of the right superior ramus.  There is comminuted and overlapping fracture of the right inferior pubic ramus.  The hips are not dislocated.  The SI joints are intact.  There are degenerative changes seen at the sacroiliac joints and the pubic symphysis.  Bones are osteopenic.  Incidental note is made of vascular calcifications and a small fat containing umbilical hernia.                                       CT Lumbar Spine Without Contrast (Final result)  Result time 08/13/24 20:31:47      Final result by Quiana Chawla MD (08/13/24 20:31:47)                   Impression:      No acute lumbar fracture.  Multilevel degenerative change greatest at L4/L5.    Small bilateral pleural effusions right greater than left.    Gallbladder sludge or gravel-like gallstones.      Electronically signed by: Quiana Chawla  Date:    08/13/2024  Time:    20:31               Narrative:    EXAMINATION:  CT OF THE LUMBAR SPINE WITHOUT    CLINICAL HISTORY:  Complaining of right hip pain secondary to fall from standing.    TECHNIQUE:  1.25 mm axial images were obtained through the lumbar spine. Coronal and sagittal  reformatted images were provided.    COMPARISON:  None.    FINDINGS:  There is no lumbar vertebral body fracture.  There is grade 1 anterolisthesis of L4 on L5.  At L3/L4, there is moderate central canal narrowing secondary to the facet arthrosis and ligamentum flavum hypertrophy without significant foraminal stenosis.  At L4/L5, there is no stone significant central canal narrowing, but there is bilateral mild foraminal narrowing.  Secondary to ligamentum flavum hypertrophy and facet arthrosis.  At L5/S1, there is a broad-based disc bulge without any significant central canal stenosis or foraminal stenosis.  There is small marginal osteophytes throughout.  Vacuum phenomena is seen at L4/L5 with mild intervertebral disc space narrowing.  Incidental note is made of small bilateral pleural effusions right greater than left and gallbladder sludge or gravel-like gallstones.                                       X-Ray Chest AP Portable (Final result)  Result time 08/13/24 17:49:32      Final result by Eddie Montilla MD (08/13/24 17:49:32)                   Impression:      1. Interstitial findings may reflect edema versus chronic change, no large focal consolidation.      Electronically signed by: Eddie Montilla MD  Date:    08/13/2024  Time:    17:49               Narrative:    EXAMINATION:  XR CHEST AP PORTABLE    CLINICAL HISTORY:  Chest Pain;    TECHNIQUE:  Single frontal view of the chest was performed.    COMPARISON:  11/03/2023    FINDINGS:  The cardiomediastinal silhouette is prominent, magnified by technique, similar to the previous exam noting calcification of the aorta..  There is no pleural effusion.  The trachea is midline.  The lungs are symmetrically expanded bilaterally with coarse interstitial attenuation in a central hilar distribution..  No large focal consolidation seen.  There is no pneumothorax.  The osseous structures are remarkable for degenerative changes..                                        X-Ray Ankle Complete Right (Final result)  Result time 08/13/24 17:50:39      Final result by Eddie Montilla MD (08/13/24 17:50:39)                   Impression:      1. Distal tibial and fibular fractures as described.      Electronically signed by: Eddie Montilla MD  Date:    08/13/2024  Time:    17:50               Narrative:    EXAMINATION:  XR ANKLE COMPLETE 3 VIEW RIGHT    CLINICAL HISTORY:  Unspecified fall, initial encounter    TECHNIQUE:  AP, lateral, and oblique images of the right ankle were performed.    COMPARISON:  Radiograph of the foot 06/01/2021    FINDINGS:  Three views right ankle.    There is osteopenia.  There is acute appearing fracture involving the distal aspect of the fibula.  There is comminuted fracture of the medial malleolus.  The ankle mortise is intact.  There is edema about the ankle.  The posterior aspect of the tibia appears intact.  There are degenerative changes of the calcaneus.  There is vascular calcification.                                       X-Ray Hip 2 or 3 views Right with Pelvis when performed (Final result)  Result time 08/13/24 17:53:13      Final result by Eddie Montilla MD (08/13/24 17:53:13)                   Impression:      1. Fractures of the right inferior and superior pubic rami.  2. There is cortical irregularity involving the right iliac wing, concerning for additional fracture.      Electronically signed by: Eddie Montilla MD  Date:    08/13/2024  Time:    17:53               Narrative:    EXAMINATION:  XR HIP WITH PELVIS WHEN PERFORMED 2 OR 3 VIEWS RIGHT    CLINICAL HISTORY:  Unspecified fall, initial encounter    TECHNIQUE:  AP view of the pelvis and frog leg lateral view of the right hip were performed.    COMPARISON:  None    FINDINGS:  Three views right hip.    The bilateral sacroiliac joints are intact.  The pubic symphysis is intact.  There is osteopenia.  There are fractures of the right superior and inferior pubic rami.  The  bilateral femoroacetabular joints are intact noting degenerative change.  There is fracture involving the right iliac wing.                                       X-Ray Knee 1 or 2 View Right (Final result)  Result time 08/13/24 17:53:54   Procedure changed from X-Ray Knee 3 View Right     Final result by Eddie Montilla MD (08/13/24 17:53:54)                   Impression:      1. No acute displaced fracture or dislocation of the knee.      Electronically signed by: Eddie Montilla MD  Date:    08/13/2024  Time:    17:53               Narrative:    EXAMINATION:  XR KNEE 1 OR 2 VIEW RIGHT    CLINICAL HISTORY:  Pain;  Unspecified fall, initial encounter    TECHNIQUE:  AP and lateral views of the right knee were performed.    COMPARISON:  11/20/2014    FINDINGS:  Two views right knee.    There is osteopenia.  There are degenerative changes of the knee.  There is vascular calcification.  No large right knee joint effusion.                                        Assessment/Plan:      * Closed right pilon fracture  Fall with ankle fx  -transferred here for ortho  -s/p Open reduction internal fixation right ankle pilon fracture with fixation of tibia and fibula on 8/14. NWB for 10 weeks  Per ortho:    We will remove splint, change dressing, and place a well-padded short-leg fiberglass cast prior to hospital discharge.  After incision healed, and sutures removed, likely 3 weeks postop, consider placement into a cam boot versus continuing the cast, depending on patient preference, inability to place an remove Cam boot        X-rays at subsequent followups:  Right ankle  Pelvis Judet     Follow-up postop  2-3 weeks - remove cast, remove sutures, consider short-leg cast versus Cam boot, based on patient's preference  6 weeks, 3 months, 6 months, 1 year    -pain control with multimodals, bowel regimen  -PT/OT  -eliquis for dvt ppx now post op from pelvic surgery  for 10 weeks while NWB- oct 26 end date      Abdominal  pain  Has chronic issues, takes maalox at home, reports having here burning with eating and requests and added scheduled now with tums prn  Constipation also making her feel full, suppository added 8/18.      Constipation  Abd pain so avoiding extra oral regimen 8/18 and ageeeable to suppository      Delirium  Present immediately post op in pacu and has been on/off with severe anxiety but now is full delirium with hallucinating and talking to the wall and seeing people/babies. Has UTIS on treatment but hasn't slept in over 48 hours and PRNS not improving with probably worsening delirium the last 24 hours, minimal responsive to zyprexa x2, iv benadryl once, and small dose ativan on 7/17 to try to calm and help sleep.  Psych consulted 8/18 to help with PRNS and sleep to try to get her on meds to promote her to sleep and for agitation until can get back to baseline while treating medical contributors as well      Hypoalbuminemia  Start boost glucose control      Hypocalcemia  Hypocalcemia is likely due to  secondary to poor intake and with low albumin lower than true result, with corrected is 8.4 and still low so start replacement . The most recent calcium and albumin results listed below.  Recent Labs     08/15/24  0457 08/16/24  0456 08/17/24  0430   CALCIUM 8.0* 8.0* 6.2*   ALBUMIN 2.2* 2.0* 1.3*     Plan  - Will replace calcium and monitor electrolytes closely.  - The patient's hypocalcemia is stable  - replace        Metabolic acidosis  Na bicarb started, chronic from ckd and likely will need long term      Tremor  Suspect possible from gabapentin as exam with some spotnaneous myoclonic jerking and hyperreflexic on exam and has been intermittent and unclear of timing, has seen neuro in clinic and had parkinsons ruled out she said but no further work up into cause  -hold gabapentin now, as variable cr may have worsened symptoms if related to this and see if improves, may take 48 hours to improve if so  Still  present but worse Cr so may take more time to clear gabapentin if from this        Acute blood loss anemia  Anemia is likely due to acute blood loss which was from surgery . Most recent hemoglobin and hematocrit are listed below.  Recent Labs     08/16/24  1924 08/17/24  0430 08/18/24  0548   HGB 8.9* 6.8* 10.8*   HCT 27.7* 20.9* 32.4*       Plan  - Monitor serial CBC: Daily  - Transfuse PRBC if patient becomes hemodynamically unstable, symptomatic or H/H drops below 7/21.  - Patient has received 2 units of PRBCs on 8/17  - Patient's anemia is currently  downtrend post op  Expected with large surgery  Improved to 10 on 8/18 now after 2 U      Transaminitis  - AST/ ALT mildly elevated  - hold statin  -slightly up at 50    Multiple fractures of pelvis  Closed fracture of right iliac wing  Fracture of acetabulum  - AF, VSS  - Xray Hip showed fractures of the right inferior and superior pubic rami   - CT Pelvis showed acute traumatic fractures involving the right iliac wing, the anterior pillar of the right acetabulum and the lateral most aspect of the right superior ramus, and the right inferior pubic ramus   - Ortho consulted- and OR 8/16 with jade with Open reduction internal fixation right both column acetabulum fracture . Multimodal plan control, eliquis started post op for dvt ppx- ortho recs for 10 weeks post op- October 26th end date  -10 weeks NWB to RLE given ankle fx also present now  -PT/OT, will need out of restraints 72 hours for SNF so will ikely be later in week as not ready for that yet with delirium      Acute cystitis  Urine from [facility] resulted 8/16 with E. Faecalis and ecoli late so was not initially on treatment, may be cause for continued confusion and not totally herself since admit her daughter has reported, start vanc for e faecalis and rocephin for ecoli as its resistant to ampicilin so can't use that to cover both unfortunately  Plan for at least 3 day course, until 8/20 at  least.      KYA (acute kidney injury)  KYA is likely due to pre-renal azotemia due to intravascular volume depletion. Baseline creatinine is  1.6 to 1.8 . Most recent creatinine and eGFR are listed below.  Recent Labs     08/16/24  0456 08/17/24  0430 08/18/24  0548   CREATININE 2.4* 1.7* 1.9*   EGFRNORACEVR 20.8* 31.5* 27.5*        Plan  - KYA is improving Cr 1.7 today and in baseine ranges after IVF down from 2.4, slightly back up 8/18 at 1.9, and monitor, oral intake starting to improve a little  - Avoid nephrotoxins and renally dose meds for GFR listed above  - Monitor urine output, serial BMP, and adjust therapy as needed    -     Chronic diastolic heart failure  - euvolemic on exam  -   - CXR showed interstitial findings may reflect edema versus chronic change, no pleural effusion  - pt is not on a diuretic at this time  - continue to monitor volume status closely  Results for orders placed during the hospital encounter of 11/03/23    Echo    Interpretation Summary    Left Ventricle: The left ventricle is normal in size. Increased wall thickness. Moderate septal thickening. Normal wall motion. There is normal systolic function with a visually estimated ejection fraction of 65 - 70%. Grade I diastolic dysfunction.    Right Ventricle: Normal right ventricular cavity size. Systolic function is normal.    Left Atrium: Left atrium is severely dilated.    Aortic Valve: There is moderate stenosis. Aortic valve area by VTI is 1.02 cm². Aortic valve peak velocity is 2.59 m/s. Mean gradient is 18 mmHg. The dimensionless index is 0.32.    Mitral Valve: There is mild regurgitation.    Tricuspid Valve: There is mild regurgitation.    Pulmonary Artery: The estimated pulmonary artery systolic pressure is 35 mmHg.    IVC/SVC: Normal venous pressure at 3 mmHg.        Stage 3a chronic kidney disease  Creatine stable for now. BMP reviewed- noted Estimated Creatinine Clearance: 30.1 mL/min (A) (based on SCr of 1.9 mg/dL  (H)). according to latest data. Based on current GFR, CKD stage is stage 4 - GFR 15-29.  Monitor UOP and serial BMP and adjust therapy as needed. Renally dose meds. Avoid nephrotoxic medications and procedures.  - Cr 1.9 (near most recent baseline which is 1.6 to 1.8) on admit but 2.2 now so will do light IVF on 8/15 but slightly worse at 2.4 now with bobbi and closer to baseline 8/17 at 1.7-->1.9 now  - daily BMP  - avoid nephrotoxic medications    Hypomagnesemia  Patient has Abnormal Magnesium: hypomagnesemia. Will continue to monitor electrolytes closely. Will replace the affected electrolytes and repeat labs to be done after interventions completed. The patient's magnesium results have been reviewed and are listed below.  Recent Labs   Lab 08/15/24  0457   MG 2.2        Coronary artery disease of native artery of native heart with stable angina pectoris  Patient with known CAD which is controlled Will continue Statin and monitor for S/Sx of angina/ACS. Continue to monitor on telemetry.   Had stents placed in 2018 to rca and 2020 to 2 areas per dr cervantes note from [facility], had stress in 2022 that was negative for ischemia. She said he wanted to recheck another one recently but insurance did not cover. She said had aspirin allergy testing before it was restarted due to prev intolerance after a stent. Resume asa pending op plans.    Type 2 diabetes mellitus with stage 3 chronic kidney disease, with long-term current use of insulin  Hard to titrate at baseline and had to have doctors adjust a lot, runs very high at times up to 1100 she said once even.   Titrate 8/15, running higher as didn't get long acting yesterday and adjusted today and will adjsut further. Has some allergies to some insulins. On tresiba at home.  - low dose SSI  - diabetic diet  - POCT checks  Baseline runs very high per dr schmidt notes she sees with endocrine outpatient. Baseline average 253 glucose on dexcom he reports. Dexcom removed for OR.  On drip in OR wasn't given llantus this AM though. Will incease dose to giev now in pacu and inc to moderate sliding scale and inc novolog and will likely titrate here as not contolled at home.  Glucose in 100s on 8/17 after 28 U lantus last night and moderate slidign scale. Isnt eating much on ful liquids now, so hold novolog unless glucose is >160 with meals and eating at least half of meal if is in 100s but she tends to run higher even when NPo yesterday was 300s in pacu so titrate further based on trending today and will watch closely. Has had no hypoglycemia on dexcom per above.  Glucose better but not eating, just starting to eat 8/18 so will likely need to titrate more    Mixed hyperlipidemia  - continue home statin    Primary hypertension  - Chronic, controlled  - continue home imdur and metoprolol-- inc imdur 8/18    Anxiety  - hx noted  - continue home fluoxetine   High anxiety at baseline per her and daugher, doctors have been hesitant to do acute anxiety meds due to fall risk they said, was not on on admit, high anxiety 8/15, will try ativan x 1 as suspect having possible panic attack after therapy sesion based on nusring description and did better after this  Very anxious in pacu and anesthesia bedside, giving precedex x 1 to relex,and required zyprexa on 8/16 PM and restraints to calm down as confused and anxious.  Will give ativan once as showing signs 8/17 of panic with chest pressure and tingling.  Now more delirious in later 8/17 pm and 8/18 worsening, talking to wall/etc. See delirium      VTE Risk Mitigation (From admission, onward)           Ordered     apixaban tablet 2.5 mg  2 times daily         08/17/24 0746     Reason for No Pharmacological VTE Prophylaxis  Once        Question:  Reasons:  Answer:  Risk of Bleeding    08/14/24 0120     IP VTE HIGH RISK PATIENT  Once         08/14/24 0120                    Discharge Planning   MIKHAIL: 8/19/2024     Code Status: Full Code   Is the patient  medically ready for discharge?:     Reason for patient still in hospital (select all that apply): Patient trending condition  Discharge Plan A: Skilled Nursing Facility                  Rupal Ochoa MD  Department of Hospital Medicine   [facility] - Surgery

## 2024-08-18 NOTE — PROGRESS NOTES
Pharmacokinetic Assessment Follow Up: IV Vancomycin    Vancomycin serum concentration assessment/plan:    Random level resulted supratherapeutic at 20.8 mcg/mL (~24h post-dose)   Goal trough is 10-20 mcg/mL for UTI   Recently resolved KYA; Scr 1.7 > 1.9 (b/l 1.6-1.8)  Will forego administering a dose now   Next random level at 2300 on 8/18/24    Drug levels (last 3 results):  Recent Labs   Lab Result Units 08/18/24  1051   Vancomycin, Random ug/mL 20.8       Pharmacy will continue to follow and monitor vancomycin.    Please contact pharmacy at extension [phone] for questions regarding this assessment.    Thank you for the consult,   Migel Woo       Patient brief summary:  Lorena Contreras is a 73 y.o. female initiated on antimicrobial therapy with IV Vancomycin for treatment of urinary tract infection      Drug Allergies:   Review of patient's allergies indicates:   Allergen Reactions    Novolin 70/30 (semi-synthetic) Nausea And Vomiting     Severe vomiting on Relion 70/30    Sulfa (sulfonamide antibiotics) Anaphylaxis    Talwin [pentazocine lactate] Anaphylaxis    Victoza [liraglutide] Nausea And Vomiting    Glipizide Nausea Only    Citric acid     Codeine     Influenza virus vaccines Hives    Iodine and iodide containing products Hives    Levetiracetam Itching    Rituxan [rituximab] Hives    Zoloft [sertraline] Nausea And Vomiting       Actual Body Weight:   81.2 kg     Renal Function:   Estimated Creatinine Clearance: 30.1 mL/min (A) (based on SCr of 1.9 mg/dL (H)).,     Dialysis Method (if applicable):  N/A    CBC (last 72 hours):  Recent Labs   Lab Result Units 08/16/24  0456 08/16/24  1924 08/17/24  0430 08/18/24  0548   WBC K/uL 8.55 16.38* 10.36 10.48   Hemoglobin g/dL 8.2* 8.9* 6.8* 10.8*   Hematocrit % 26.7* 27.7* 20.9* 32.4*   Platelets K/uL 130* 178 140* 178   Gran % % 69.1 88.4* 75.3* 79.1*   Lymph % % 17.1* 3.8* 13.1* 8.2*   Mono % % 10.3 7.0 10.5 10.5   Eosinophil % % 2.7 0.0 0.1 1.0   Basophil  % % 0.4 0.2 0.3 0.6   Differential Method  Automated Automated Automated Automated       Metabolic Panel (last 72 hours):  Recent Labs   Lab Result Units 08/16/24  0456 08/17/24  0430 08/18/24  0548   Sodium mmol/L 129* 132* 135*   Potassium mmol/L 4.4 3.5 4.9   Chloride mmol/L 102 111* 105   CO2 mmol/L 18* 15* 20*   Glucose mg/dL 136* 163* 190*   BUN mg/dL 36* 39* 42*   Creatinine mg/dL 2.4* 1.7* 1.9*   Albumin g/dL 2.0* 1.3* 1.9*   Total Bilirubin mg/dL 0.3 0.4 0.5   Alkaline Phosphatase U/L 164* 107 159*   AST U/L 50* 38 50*   ALT U/L 13 <5* 9*   Magnesium mg/dL 2.0  --  1.9   Phosphorus mg/dL  --   --  3.6       Vancomycin Administrations:  vancomycin given in the last 96 hours                     vancomycin 1,250 mg in D5W 250 mL IVPB (Vial-Mate) (mg) 1,250 mg New Bag 08/17/24 1028    vancomycin injection (g) 2 g Given 08/16/24 1403    vancomycin injection (g) 1 g Given 08/14/24 1332                    Microbiologic Results:  Microbiology Results (last 7 days)       Procedure Component Value Units Date/Time    Urine culture [[id number]]  (Abnormal)  (Susceptibility) Collected: 08/14/24 0229    Order Status: Completed Specimen: Urine Updated: 08/16/24 1351     Urine Culture, Routine ESCHERICHIA COLI  >100,000 cfu/ml        ENTEROCOCCUS FAECALIS  > 100,000 cfu/ml      Narrative:      Specimen Source->Urine

## 2024-08-18 NOTE — PLAN OF CARE
Problem: Physical Therapy  Goal: Physical Therapy Goal  Description: Goals to be met by: 24     Patient will increase functional independence with mobility by performin. Supine to sit with Stand-by Assistance  2. Sit to stand transfer with Minimal Assistance  3. Bed to chair transfer with Minimal Assistance using Rolling Walker  4. Gait  x 10 feet with Minimal Assistance using Rolling Walker.   5. Wheelchair propulsion x100 feet with Supervision using bilateral upper extremities  6. Stand for 5 minutes with Contact Guard Assistance using Rolling Walker    Patient has a mobility limitation that significantly impairs their ability to participate in one or more mobility related activities of daily living in customary locations in the home. The mobility limitation cannot be sufficiently resolved by the use of a cane or walker. The use of a manual wheelchair will greatly improve the patient's ability to participate in MRADLs. The patient will use the wheelchair on a regular basis at home. They have expressed their willingness to use a manual wheelchair in the home, and have a caregiver who is available and willing to assist with the wheelchair if needed.    Outcome: Progressing     Goals re-established

## 2024-08-18 NOTE — ASSESSMENT & PLAN NOTE
KYA is likely due to pre-renal azotemia due to intravascular volume depletion. Baseline creatinine is  1.6 to 1.8 . Most recent creatinine and eGFR are listed below.  Recent Labs     08/16/24  0456 08/17/24  0430 08/18/24  0548   CREATININE 2.4* 1.7* 1.9*   EGFRNORACEVR 20.8* 31.5* 27.5*        Plan  - KYA is improving Cr 1.7 today and in baseine ranges after IVF down from 2.4, slightly back up 8/18 at 1.9, and monitor, oral intake starting to improve a little  - Avoid nephrotoxins and renally dose meds for GFR listed above  - Monitor urine output, serial BMP, and adjust therapy as needed    -

## 2024-08-18 NOTE — SUBJECTIVE & OBJECTIVE
Interval history- she didn't have a good day yesterday, she was agitated and delirious and confused most of day intermittently, restraints removed in AM on exam but had to be replaced mid day as pulled out IV when blood runnign and said she had to get all of the blood out of her in confusion. Had somehat of panic sounding attack with tingling/numbness, chest pressure earlier in am and given 1/2 mg ativan with improvement but then deliriom issues above later in day, given xyprea 5 mg with no results, given IV benadryl later in afternoon and nursing reported eyelids heavy but patient was fighting sleep. Around 630 pm was getting very agitated in restraints again and so given zyprexa 10 once. It does not appear she slept much at all, she says she hasn't slept and she is still in similar state, daughters reports possibly worse. On my exam this Am before came back when daughters there to talk to them, she was orienetd at least to situation (she knew she lived on [facility], she knew this was hospital, that she had surgery on leg and not arm, she [place] and that she [place] an  craft last year she had told me about before surgery), so intermittent issues and hallucinating a man in corner, a baby in room. After my exam this Am I could hear her having conversation with the wall repeatedly. She was seeing things in room when I came back to talk to her daughters. She does have 2 UTIS that treatment was started yesterady for, but I suspect a major contributor also is poor sleep and psych consulted to assist with recs for sleep and for PRNS for delirium as meds may be worsening if not on right mesd for PRNS as well to try to get back on sleep cycles. Hgbetter at 10 now after transfusion. She didtn eat at all for almost 2 days, ate some pudding on 2nd exam today and daughters got her to eat some cream of wheat and drink some protein drink this AM. She said her stomach is burning, and  "asked for maalox and added as well as tums for her. No BM since damit so suppository added and she's agreeable to this as feels "full". Benoit kept in due to being restrained for now. Glucose was lower 200s now but isnt eating so will need to titrate later today given starting to eat more, is on liquisd for now. Bp higher 150s-170s so increased isorbide. She said she has had intermittent palpations since went from atenolol to metoprolol a few years ago and liked that better and discsused why they changed it per cards notes, due to CAD indication for metoprolol.   Will f/u psych recs further, suspect will take some more time to clear mental status and will delay SNF placement as has to also be out of restraints 72 hours and isnt ready to do this yet, restraint order renewed now.             Review of patient's allergies indicates:   Allergen Reactions    Novolin 70/30 (semi-synthetic) Nausea And Vomiting     Severe vomiting on Relion 70/30    Sulfa (sulfonamide antibiotics) Anaphylaxis    Talwin [pentazocine lactate] Anaphylaxis    Victoza [liraglutide] Nausea And Vomiting    Glipizide Nausea Only    Citric acid     Codeine     Influenza virus vaccines Hives    Iodine and iodide containing products Hives    Levetiracetam Itching    Rituxan [rituximab] Hives    Zoloft [sertraline] Nausea And Vomiting       No current facility-administered medications on file prior to encounter.     Current Outpatient Medications on File Prior to Encounter   Medication Sig    Bacillus coagulans (DIGESTIVE ADVANTAGE IMMUNE ORAL) Take by mouth.    diclofenac sodium (VOLTAREN TOP) Apply topically.    gabapentin (NEURONTIN) 300 MG capsule Take 1 capsule (300 mg total) by mouth 3 (three) times daily.    hydrALAZINE (APRESOLINE) 50 MG tablet Take 1 tablet (50 mg total) by mouth every 8 (eight) hours.    LANTUS SOLOSTAR U-100 INSULIN 100 unit/mL (3 mL) InPn pen Inject 12 Units into the skin every evening.    NOVOLOG FLEXPEN U-100 INSULIN 100 " unit/mL (3 mL) InPn pen Inject 20 Units into the skin 3 (three) times daily with meals.    albuterol (PROVENTIL/VENTOLIN HFA) 90 mcg/actuation inhaler Inhale 2 puffs into the lungs every 6 (six) hours as needed for Wheezing or Shortness of Breath.    ASCORBIC ACID, VITAMIN C, ORAL Take by mouth once daily.    aspirin 81 MG Chew Take 1 tablet (81 mg total) by mouth once daily.    atorvastatin (LIPITOR) 80 MG tablet Take 1 tablet by mouth in the evening    cholecalciferol, vitamin D3, (VITAMIN D3) 50 mcg (2,000 unit) Cap Take 2 capsules by mouth 2 (two) times daily.    cyanocobalamin (VITAMIN B-12) 1000 MCG tablet Take 100 mcg by mouth once daily.    DEXCOM G7  Misc Use 1  to track blood glucose, ICD10: E11.65    DEXCOM G7 SENSOR Samina Use 1 sensor every 10 days to track blood glucose, ICD10: E11.65, okay with 90 day supply if possible    EPINEPHrine (EPIPEN) 0.3 mg/0.3 mL AtIn Inject 0.3 mLs (0.3 mg total) into the muscle once. for 1 dose    ESOMEPRAZOLE MAGNESIUM ORAL Take by mouth as needed. (Patient not taking: Reported on 5/28/2024)    FLUoxetine 40 MG capsule Take 1 capsule (40 mg total) by mouth once daily.    isosorbide mononitrate (IMDUR) 30 MG 24 hr tablet Take 1 tablet (30 mg total) by mouth once daily.    LIDOcaine (LIDODERM) 5 % Place 1 patch onto the skin once daily. Remove & Discard patch within 12 hours or as directed by MD.    LORazepam (ATIVAN) 1 MG tablet Take 1 tablet (1 mg total) by mouth 2 (two) times daily as needed for Anxiety.    magnesium oxide (MAG-OX) 400 mg tablet Take 400 mg by mouth once daily.    melatonin 10 mg Cap Take 10 mg by mouth nightly.    metoprolol succinate (TOPROL-XL) 25 MG 24 hr tablet Take 1 tablet (25 mg total) by mouth once daily.    multivitamin/iron/folic acid (CENTRUM COMPLETE ORAL) Take by mouth.    OZEMPIC 1 mg/dose (4 mg/3 mL) Inject 1 mg into the skin every 7 days.    pantoprazole (PROTONIX) 40 MG tablet Take 1 tablet (40 mg total) by mouth once  "daily.    pen needle, diabetic (BD ULTRA-FINE SAGAR PEN NEEDLE) 32 gauge x 5/32" Ndle One pen needle use with insulin pen 4 times a day.  ICD-10: E11.9    semaglutide (OZEMPIC) 0.25 mg or 0.5 mg (2 mg/3 mL) pen injector Inject 0.5 mg once weekly thereafter    ticagrelor (BRILINTA) 90 mg tablet Take 1 tablet (90 mg total) by mouth 2 (two) times daily.    vitamin E 1000 UNIT capsule Take 1,000 Units by mouth once daily.     Family History       Problem Relation (Age of Onset)    Allergy (severe) Daughter    Cancer Mother, Father    Diabetes Sister    Heart disease Mother    Hypertension Sister    Lung cancer Brother    No Known Problems Daughter          Tobacco Use    Smoking status: Never    Smokeless tobacco: Never   Substance and Sexual Activity    Alcohol use: Not Currently    Drug use: Never    Sexual activity: Not Currently     Partners: Male     Review of Systems   Constitutional:  Positive for activity change. Negative for chills and fever.   HENT:  Negative for congestion, hearing loss, rhinorrhea and sore throat.    Eyes:  Negative for visual disturbance.   Respiratory:  Negative for shortness of breath.    Cardiovascular:  Negative for chest pain and leg swelling.   Gastrointestinal:  Negative for abdominal pain, constipation, diarrhea, nausea and vomiting.   Genitourinary:  Negative for dysuria, frequency and urgency.   Musculoskeletal:  Positive for arthralgias and myalgias.   Neurological:  Positive for numbness (chronic).     Objective:     Vital Signs (Most Recent):  Temp: 99.4 °F (37.4 °C) (08/18/24 0744)  Pulse: 93 (08/18/24 0744)  Resp: 16 (08/18/24 0744)  BP: (!) 143/65 (08/18/24 0744)  SpO2: (!) 94 % (08/18/24 0744) Vital Signs (24h Range):  Temp:  [98.4 °F (36.9 °C)-99.4 °F (37.4 °C)] 99.4 °F (37.4 °C)  Pulse:  [81-98] 93  Resp:  [16-20] 16  SpO2:  [91 %-99 %] 94 %  BP: (141-182)/(60-82) 143/65     Weight: 81.2 kg (179 lb 0.2 oz)  Body mass index is 26.44 kg/m².     Physical Exam  Constitutional: "       General: She is not in acute distress.     Appearance: Normal appearance. She is not ill-appearing.      Comments: Daughters at bedside. Delirious, hallucinating at times. However is still oriented to general situation and conversive as well at times. Ate a few bites of pudding for daughter. Restraitns in UE.   HENT:      Head: Normocephalic and atraumatic.   Eyes:      Extraocular Movements: Extraocular movements intact.   Cardiovascular:      Rate and Rhythm: Normal rate and regular rhythm.      Heart sounds: Murmur heard.      No friction rub. No gallop.   Pulmonary:      Effort: Pulmonary effort is normal.      Breath sounds: Normal breath sounds. No wheezing, rhonchi or rales.   Abdominal:      General: There is no distension.      Tenderness: There is no abdominal tenderness. There is no guarding.   Musculoskeletal:         General: Tenderness and signs of injury present.      Right lower leg: No edema.      Left lower leg: No edema.      Comments: [place] to right ankle and right LE post op.  TTP over R hip  Sensation diminished to toes 2/2 neuropathy (chronic)   Neurological:      General: No focal deficit present.      Mental Status: She is alert and oriented to person, place, and time.      Comments: Tremors at times in arms, present on admit. Oriented to where she lives ([facility]), surgery  (leg), favorite holiday ([facility]), place (hospital), but delirious on/off. Hallucinating man, baby. Talking to the wall and having conversation after I left the room and was in hallway could hear this. Not at baselien at all. Restraints in UE.                Significant Labs: All pertinent labs within the past 24 hours have been reviewed.  BMP:   Recent Labs   Lab 08/18/24  0548   *   *   K 4.9      CO2 20*   BUN 42*   CREATININE 1.9*   CALCIUM 8.5*   MG 1.9     CBC:   Recent Labs   Lab 08/16/24  1924 08/17/24  0430 08/18/24  0548   WBC 16.38* 10.36 10.48   HGB 8.9* 6.8* 10.8*   HCT 27.7* 20.9*  32.4*    140* 178       Significant Imaging: I have reviewed all pertinent imaging results/findings within the past 24 hours.    Imaging Results              CT Ankle (Including Hindfoot) Without Contrast Right (Final result)  Result time 08/14/24 04:49:46      Final result by Portillo Oquendo MD (08/14/24 04:49:46)                   Impression:      Near anatomic alignment of complex mildly displaced distal tibia fracture and mildly displaced distal fibular fracture.    Electronically signed by resident: Hira Patel  Date:    08/14/2024  Time:    03:56    Electronically signed by: Portillo Oquendo MD  Date:    08/14/2024  Time:    04:49               Narrative:    EXAMINATION:  CT ANKLE (INCLUDING HINDFOOT) WITHOUT CONTRAST RIGHT; CT 3D RENDERING WO INDEPENDENT WORKSTATION    CLINICAL HISTORY:  Fracture, ankle;; Fracture, ankle;per order request;    TECHNIQUE:  Axial 0.625-mm images of the right ankle obtained without intravenous contrast.  Data submitted for coronal and sagittal reformats.  3D reconstructed images were acquired by post processing on an independent workstation with concurrent supervision and archived for permanent record. 3D imaging of the right ankle was obtained for further evaluation and operative planning if needed.    COMPARISON:  Ankle radiograph 03/14/2024.    FINDINGS:  Bones are demineralized.  There is a cast in place.  There is mildly displaced comminuted distal tibial fracture and a minimally displaced distal fibular fracture with medial angulation of the fracture fragment.  Intra-articular extension fracture fragments which are mildly displaced involving the distal tibia near anatomic alignment of fracture fragments.  Fractures involve the posterior and medial malleoli.  No dislocation.  There are a few foci of subcutaneous gas overlying the tibial fracture, possibly relating to trauma or reduction procedure though correlation is advised.  Dense calcific tendinosis of the  posterior tibial tendon.  No full-thickness injury of the Achilles tendon.  Soft tissue edema about the ankle.  Prominent vascular calcifications noted.                                       CT 3D Rendering WO Independent Workstation (Final result)  Result time 08/14/24 04:49:46      Final result by Portillo Oquendo MD (08/14/24 04:49:46)                   Impression:      Near anatomic alignment of complex mildly displaced distal tibia fracture and mildly displaced distal fibular fracture.    Electronically signed by resident: Hira Patel  Date:    08/14/2024  Time:    03:56    Electronically signed by: Portillo Oquendo MD  Date:    08/14/2024  Time:    04:49               Narrative:    EXAMINATION:  CT ANKLE (INCLUDING HINDFOOT) WITHOUT CONTRAST RIGHT; CT 3D RENDERING WO INDEPENDENT WORKSTATION    CLINICAL HISTORY:  Fracture, ankle;; Fracture, ankle;per order request;    TECHNIQUE:  Axial 0.625-mm images of the right ankle obtained without intravenous contrast.  Data submitted for coronal and sagittal reformats.  3D reconstructed images were acquired by post processing on an independent workstation with concurrent supervision and archived for permanent record. 3D imaging of the right ankle was obtained for further evaluation and operative planning if needed.    COMPARISON:  Ankle radiograph 03/14/2024.    FINDINGS:  Bones are demineralized.  There is a cast in place.  There is mildly displaced comminuted distal tibial fracture and a minimally displaced distal fibular fracture with medial angulation of the fracture fragment.  Intra-articular extension fracture fragments which are mildly displaced involving the distal tibia near anatomic alignment of fracture fragments.  Fractures involve the posterior and medial malleoli.  No dislocation.  There are a few foci of subcutaneous gas overlying the tibial fracture, possibly relating to trauma or reduction procedure though correlation is advised.  Dense calcific  tendinosis of the posterior tibial tendon.  No full-thickness injury of the Achilles tendon.  Soft tissue edema about the ankle.  Prominent vascular calcifications noted.                                       X-Ray Ankle Complete Right (Final result)  Result time 08/14/24 03:44:09      Final result by Portillo Oquendo MD (08/14/24 03:44:09)                   Impression:      Similar alignment of distal tibia and distal fibula fractures post reduction.      Electronically signed by: Portillo Oquendo MD  Date:    08/14/2024  Time:    03:44               Narrative:    EXAMINATION:  XR ANKLE COMPLETE 3 VIEW RIGHT    CLINICAL HISTORY:  Post reduction;    TECHNIQUE:  AP, lateral, and oblique images of the right ankle were performed.    COMPARISON:  08/13/2024.    FINDINGS:  Exam quality is limited by suboptimal positioning and overlapping cast material.  Acute fractures of the distal tibia and distal fibula as seen previously.  Bones are demineralized.  No gross dislocation or significant worsening alignment of fracture fragments.  Soft tissue edema.                                       CT Pelvis Without Contrast (Final result)  Result time 08/13/24 20:58:12      Final result by Quiana Chawla MD (08/13/24 20:58:12)                   Impression:      Acute traumatic fractures involving the right iliac wing, the anterior pillar of the right acetabulum and the lateral most aspect of the right superior ramus, and the right inferior pubic ramus.  No hip dislocation.      Electronically signed by: Quiana Chawla  Date:    08/13/2024  Time:    20:58               Narrative:    EXAMINATION:  CT PELVIS WITHOUT CONTRAST    CLINICAL HISTORY:  Pelvic trauma;    TECHNIQUE:  1.25 mm axial images were obtained through the pelvis from above the iliac crest through the upper femoral shafts.    COMPARISON:  Plain hip radiograph 08/13/2020 full    FINDINGS:  There is a minimally displaced fracture of the right iliac wing.  There  are comminuted fractures involving the anterior pillar of the right acetabulum and the lateral most aspect of the right superior ramus.  There is comminuted and overlapping fracture of the right inferior pubic ramus.  The hips are not dislocated.  The SI joints are intact.  There are degenerative changes seen at the sacroiliac joints and the pubic symphysis.  Bones are osteopenic.  Incidental note is made of vascular calcifications and a small fat containing umbilical hernia.                                       CT Lumbar Spine Without Contrast (Final result)  Result time 08/13/24 20:31:47      Final result by Quiana Chawla MD (08/13/24 20:31:47)                   Impression:      No acute lumbar fracture.  Multilevel degenerative change greatest at L4/L5.    Small bilateral pleural effusions right greater than left.    Gallbladder sludge or gravel-like gallstones.      Electronically signed by: Quiana Chawla  Date:    08/13/2024  Time:    20:31               Narrative:    EXAMINATION:  CT OF THE LUMBAR SPINE WITHOUT    CLINICAL HISTORY:  Complaining of right hip pain secondary to fall from standing.    TECHNIQUE:  1.25 mm axial images were obtained through the lumbar spine. Coronal and sagittal reformatted images were provided.    COMPARISON:  None.    FINDINGS:  There is no lumbar vertebral body fracture.  There is grade 1 anterolisthesis of L4 on L5.  At L3/L4, there is moderate central canal narrowing secondary to the facet arthrosis and ligamentum flavum hypertrophy without significant foraminal stenosis.  At L4/L5, there is no stone significant central canal narrowing, but there is bilateral mild foraminal narrowing.  Secondary to ligamentum flavum hypertrophy and facet arthrosis.  At L5/S1, there is a broad-based disc bulge without any significant central canal stenosis or foraminal stenosis.  There is small marginal osteophytes throughout.  Vacuum phenomena is seen at L4/L5 with mild intervertebral  disc space narrowing.  Incidental note is made of small bilateral pleural effusions right greater than left and gallbladder sludge or gravel-like gallstones.                                       X-Ray Chest AP Portable (Final result)  Result time 08/13/24 17:49:32      Final result by Eddie Montilla MD (08/13/24 17:49:32)                   Impression:      1. Interstitial findings may reflect edema versus chronic change, no large focal consolidation.      Electronically signed by: Eddie Montilla MD  Date:    08/13/2024  Time:    17:49               Narrative:    EXAMINATION:  XR CHEST AP PORTABLE    CLINICAL HISTORY:  Chest Pain;    TECHNIQUE:  Single frontal view of the chest was performed.    COMPARISON:  11/03/2023    FINDINGS:  The cardiomediastinal silhouette is prominent, magnified by technique, similar to the previous exam noting calcification of the aorta..  There is no pleural effusion.  The trachea is midline.  The lungs are symmetrically expanded bilaterally with coarse interstitial attenuation in a central hilar distribution..  No large focal consolidation seen.  There is no pneumothorax.  The osseous structures are remarkable for degenerative changes..                                       X-Ray Ankle Complete Right (Final result)  Result time 08/13/24 17:50:39      Final result by Eddie Montilla MD (08/13/24 17:50:39)                   Impression:      1. Distal tibial and fibular fractures as described.      Electronically signed by: Eddie Montilla MD  Date:    08/13/2024  Time:    17:50               Narrative:    EXAMINATION:  XR ANKLE COMPLETE 3 VIEW RIGHT    CLINICAL HISTORY:  Unspecified fall, initial encounter    TECHNIQUE:  AP, lateral, and oblique images of the right ankle were performed.    COMPARISON:  Radiograph of the foot 06/01/2021    FINDINGS:  Three views right ankle.    There is osteopenia.  There is acute appearing fracture involving the distal aspect of the fibula.   There is comminuted fracture of the medial malleolus.  The ankle mortise is intact.  There is edema about the ankle.  The posterior aspect of the tibia appears intact.  There are degenerative changes of the calcaneus.  There is vascular calcification.                                       X-Ray Hip 2 or 3 views Right with Pelvis when performed (Final result)  Result time 08/13/24 17:53:13      Final result by Eddie Montilla MD (08/13/24 17:53:13)                   Impression:      1. Fractures of the right inferior and superior pubic rami.  2. There is cortical irregularity involving the right iliac wing, concerning for additional fracture.      Electronically signed by: Eddie Montilla MD  Date:    08/13/2024  Time:    17:53               Narrative:    EXAMINATION:  XR HIP WITH PELVIS WHEN PERFORMED 2 OR 3 VIEWS RIGHT    CLINICAL HISTORY:  Unspecified fall, initial encounter    TECHNIQUE:  AP view of the pelvis and frog leg lateral view of the right hip were performed.    COMPARISON:  None    FINDINGS:  Three views right hip.    The bilateral sacroiliac joints are intact.  The pubic symphysis is intact.  There is osteopenia.  There are fractures of the right superior and inferior pubic rami.  The bilateral femoroacetabular joints are intact noting degenerative change.  There is fracture involving the right iliac wing.                                       X-Ray Knee 1 or 2 View Right (Final result)  Result time 08/13/24 17:53:54   Procedure changed from X-Ray Knee 3 View Right     Final result by Eddie Montilla MD (08/13/24 17:53:54)                   Impression:      1. No acute displaced fracture or dislocation of the knee.      Electronically signed by: Eddie Montilla MD  Date:    08/13/2024  Time:    17:53               Narrative:    EXAMINATION:  XR KNEE 1 OR 2 VIEW RIGHT    CLINICAL HISTORY:  Pain;  Unspecified fall, initial encounter    TECHNIQUE:  AP and lateral views of the right knee were  performed.    COMPARISON:  11/20/2014    FINDINGS:  Two views right knee.    There is osteopenia.  There are degenerative changes of the knee.  There is vascular calcification.  No large right knee joint effusion.

## 2024-08-18 NOTE — ASSESSMENT & PLAN NOTE
Closed fracture of right iliac wing  Fracture of acetabulum  - AF, VSS  - Xray Hip showed fractures of the right inferior and superior pubic rami   - CT Pelvis showed acute traumatic fractures involving the right iliac wing, the anterior pillar of the right acetabulum and the lateral most aspect of the right superior ramus, and the right inferior pubic ramus   - Ortho consulted- and OR 8/16 with jade with Open reduction internal fixation right both column acetabulum fracture . Multimodal plan control, eliquis started post op for dvt ppx- ortho recs for 10 weeks post op- October 26th end date  -10 weeks NWB to RLE given ankle fx also present now  -PT/OT, will need out of restraints 72 hours for SNF so will ikely be later in week as not ready for that yet with delirium

## 2024-08-18 NOTE — PT/OT/SLP RE-EVAL
Occupational Therapy   Re-evaluation    Name: Lorena Contreras  MRN: [id number]  Admitting Diagnosis:  Closed right pilon fracture  Recent Surgery: Procedure(s) (LRB):  ORIF, FRACTURE, ACETABULUM (Right) 2 Days Post-Op    Recommendations:     Discharge Recommendations: High Intensity Therapy  Discharge Equipment Recommendations: wheelchair  Barriers to discharge:  Decreased caregiver support (increasing assistance needed)    CO-TX with PT for pt safety and max participation with both disciplines.     Assessment:     oLrena Contreras is a 73 y.o. female with a medical diagnosis of Closed right pilon fracture.  She presents with pleasant confusion and non rated R LE pain.  Performance deficits affecting function are weakness, impaired cognition, impaired endurance, impaired self care skills, impaired functional mobility, gait instability, impaired balance, decreased lower extremity function, decreased safety awareness, pain, orthopedic precautions.  Pt requires increased assistance 2/2 to declining mobility and AMS.     Rehab Prognosis:  Good; patient would benefit from acute skilled OT services to address these deficits and reach maximum level of function.       Plan:     Patient to be seen 4 x/week to address the above listed problems via self-care/home management, therapeutic activities, therapeutic exercises, neuromuscular re-education  Plan of Care Expires: 09/15/24  Plan of Care Reviewed with: patient, daughter    Subjective     Chief Complaint: R LE pain  Patient/Family stated goals: Return back to baseline  Communicated with: Elva prior to session.  Pain/Comfort:  Pain Rating 1:  (not rated)  Location - Side 1: Right  Location - Orientation 1: generalized  Location 1: leg  Pain Addressed 1: Reposition, Distraction    Objective:     Communicated with: Elva prior to session.  Patient found supine with: peripheral IV upon OT entry to room.    General Precautions: Standard, fall  Orthopedic Precautions: RLE non  weight bearing  Braces:  (R LE cast)  Respiratory Status: Room air    Occupational Performance:    Bed Mobility:    Patient completed Supine to Sit with maximal assistance  Patient completed Sit to Supine with maximal assistance    Functional Mobility/Transfers:  Patient completed Sit <> Stand Transfer with total assistance and of 2 persons  with  no assistive device   Functional Mobility: Pt unable to ambulate 2/2 to poor standing balance and safety    Activities of Daily Living:  Feeding:  total assistance dtr feeding pt breakfast sitting EOB  Grooming: stand by assistance facial H using wash cloth  Upper Body Dressing: moderate assistance adjust and manage gown     Cognitive/Visual Perceptual:  Pt pleasantly confused; Pt easily distractible as well. Person aware of name and situation    Physical Exam:  BUE WFL  Balance: fair- for sitting; poor for standing    AMPAC 6 Click:  AMPAC Total Score: 14    Treatment & Education:  Pt educated on role and purpose of therapy  Pt educated on goal setting  Pt educated on benefits of OOB activity  Pt educated on self advocacy   Pt and dtr educated on NWB precautions     Patient left HOB elevated with all lines intact, call button in reach, nsg notified, and dtr present    GOALS:   Multidisciplinary Problems       Occupational Therapy Goals          Problem: Occupational Therapy    Goal Priority Disciplines Outcome Interventions   Occupational Therapy Goal     OT, PT/OT Progressing    Description: Goals to be met by: 9/15/2024     Patient will increase functional independence with ADLs by performing:    UE Dressing with Supervision.  LE Dressing with Minimal Assistance.  Grooming while standing at sink with Minimal Assistance.  Toileting from bedside commode with Moderate Assistance for hygiene and clothing management.   Step transfer with Moderate Assistance  Toilet transfer to bedside commode with Minimal Assistance.                         History:     Past Medical History:    Diagnosis Date    Allergy     Altered mental status 06/19/2022    DYSARTHRIA, SPASTIC MOVEMENTS & DIFFICULTY SWALLOWING    Anemia     Anxiety     Arthritis     Cataract     both removed    Colon polyps     Coronary artery disease     Depression     Diabetes mellitus, type II     Disorder of kidney and ureter     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/2023    Fibromyalgia     Follicular lymphoma     GERD (gastroesophageal reflux disease)     HTN (hypertension)     Hyperlipidemia     MI (myocardial infarction) 03/2019    Personal history of colonic polyps     Restless leg syndrome     Stroke          Past Surgical History:   Procedure Laterality Date    COLONOSCOPY  11/07/2012    Colon polyp found; repeat in 5 years    ELBOW SURGERY Right 2015    dislocation repair     ESOPHAGOGASTRODUODENOSCOPY  11/07/2012    atrophic gastritis, H pylori testing negative    INCISION AND DRAINAGE FOOT Right 6/2/2021    Procedure: INCISION AND DRAINAGE, FOOT, bone biopsy;  Surgeon: Quiana Penn DPM;  Location: [facility] OR;  Service: Podiatry;  Laterality: Right;    KNEE SURGERY Bilateral 2015    scoped    LEFT HEART CATHETERIZATION Left 3/29/2019    Procedure: Left heart cath;  Surgeon: Bladimir Barbosa MD;  Location: [facility] CATH LAB;  Service: Cardiology;  Laterality: Left;    LEFT HEART CATHETERIZATION Left 11/18/2019    Procedure: Left heart cath;  Surgeon: Karl Rico MD;  Location: [facility] CATH LAB;  Service: Cardiology;  Laterality: Left;    LEFT HEART CATHETERIZATION Left 1/8/2020    Procedure: Left heart cath, right radial, noon start;  Surgeon: Christos Monreal MD;  Location: [facility] CATH LAB;  Service: Cardiology;  Laterality: Left;  RN Pre Op 1-6-20.  To be admitted 1-7-20 sor Aspirin Disensitation    OPEN REDUCTION AND INTERNAL FIXATION (ORIF) OF PILON FRACTURE Right 8/14/2024    Procedure: ORIF, FRACTURE, PILON;  Surgeon: Neto Davalos MD;  Location: [address];  Service: Orthopedics;  Laterality: Right;     TONSILLECTOMY  1955    ULTRASOUND GUIDANCE  1/8/2020    Procedure: Ultrasound Guidance;  Surgeon: Christos Monreal MD;  Location: [facility] CATH LAB;  Service: Cardiology;;       Time Tracking:     OT Date of Treatment: 08/18/24  OT Start Time: 0927  OT Stop Time: 0951  OT Total Time (min): 24 min    Billable Minutes:Re-eval 12  Self Care/Home Management 12    8/18/2024

## 2024-08-18 NOTE — PLAN OF CARE
Problem: Adult Inpatient Plan of Care  Goal: Plan of Care Review  Outcome: Progressing  Goal: Patient-Specific Goal (Individualized)  Outcome: Progressing  Goal: Absence of Hospital-Acquired Illness or Injury  Outcome: Progressing  Goal: Optimal Comfort and Wellbeing  Outcome: Progressing  Goal: Readiness for Transition of Care  Outcome: Progressing     Problem: Infection  Goal: Absence of Infection Signs and Symptoms  Outcome: Progressing     Problem: Diabetes Comorbidity  Goal: Blood Glucose Level Within Targeted Range  Outcome: Progressing     Problem: Skin Injury Risk Increased  Goal: Skin Health and Integrity  Outcome: Progressing     Problem: Wound  Goal: Optimal Coping  Outcome: Progressing  Goal: Optimal Functional Ability  Outcome: Progressing  Goal: Absence of Infection Signs and Symptoms  Outcome: Progressing  Goal: Improved Oral Intake  Outcome: Progressing  Goal: Optimal Pain Control and Function  Outcome: Progressing  Goal: Skin Health and Integrity  Outcome: Progressing  Goal: Optimal Wound Healing  Outcome: Progressing     Problem: Acute Kidney Injury/Impairment  Goal: Fluid and Electrolyte Balance  Outcome: Progressing  Goal: Improved Oral Intake  Outcome: Progressing  Goal: Effective Renal Function  Outcome: Progressing     Problem: Fall Injury Risk  Goal: Absence of Fall and Fall-Related Injury  Outcome: Progressing     Problem: Restraint, Nonviolent  Goal: Absence of Harm or Injury  Outcome: Progressing     Pt tolerated 2 units blood today, daughter at bedside this am . Pt not wanting to eat today , pt did tolerate po meds with water , but not really wanting to eat , Dr Notified , pt given zyprex today and tolerated  spoke with daughter Brooke today and notified of status, will monitor , next shift notified

## 2024-08-18 NOTE — PT/OT/SLP RE-EVAL
Physical Therapy [id number]    Patient Name:  Lorena Contreras   MRN:  [id number]    Co-evaluation and co-treatment performed for this visit due to suspected patient need for two skilled therapists to ensure patient and staff safety and to accommodate for patient activity tolerance/pain management     Recommendations:     Discharge Recommendations: High Intensity Therapy  Discharge Equipment Recommendations: wheelchair   Barriers to discharge: None    Assessment:     Lorena Contreras is a 73 y.o. female admitted with a medical diagnosis of Closed right pilon fracture.  She presents with the following impairments/functional limitations: weakness, impaired endurance, impaired self care skills, impaired functional mobility, gait instability, impaired balance, impaired cognition, decreased lower extremity function, decreased safety awareness, pain, orthopedic precautions Pt. cooperative but had limited tolerance to treatment due to confusion/hallucinations. Pt. was able to sit on EOB for >5 min. with SBA-Mod A, but unable to perform successful stand at EOB with Total A x2..    Rehab Prognosis:  good; patient would benefit from acute skilled PT services to address these deficits and reach maximum level of function.      Recent Surgery: Procedure(s) (LRB):  ORIF, FRACTURE, ACETABULUM (Right) 2 Days Post-Op    Plan:     During this hospitalization, patient to be seen 4 x/week to address the above listed problems via gait training, therapeutic activities, therapeutic exercises, neuromuscular re-education, wheelchair management/training  Plan of Care Expires:  09/14/24  Plan of Care Reviewed with: patient, daughter    Subjective     Communicated with nursing prior to session.  Patient found supine with peripheral IV, chong catheter, SCD, soft wrist restraints upon PT entry to room, agreeable to evaluation.      Chief Complaint: no specific c/o  Patient comments/goals: pt. Agreeable to PT  Pain/Comfort:  Pain  Rating 1:  (pt. did not rate)  Location - Side 1: Right  Location - Orientation 1: generalized  Location 1: leg  Pain Addressed 1: Reposition, Distraction    Patients cultural, spiritual, Hindu conflicts given the current situation: no      Objective:     Patient found with: peripheral IV, chong catheter, SCD     General Precautions: Standard, fall  Orthopedic Precautions: RLE non weight bearing  Braces:  ((R) LE cast)  Respiratory Status: Room air    Exams:  RLE ROM: WFL except lower leg cast  RLE Strength: unable to accurately test due to confusion  LLE ROM: WFL  LLE Strength: unable to accurately test due to confusion    Functional Mobility:  Bed Mobility:     Rolling Right: maximal assistance  Scooting: maximal assistance  Supine to Sit: maximal assistance  Sit to Supine: maximal assistance  Transfers:     Sit to Stand:  total assistance and of 2 persons with hand-held assist  Balance: poor sitting/standing. Pt. with posterior lean in sitting.    AM-PAC 6 CLICK MOBILITY  Total Score:8       Treatment and Education:   Discussed therapy needs, goals, safety with mobility, and POC. Pt. Performed sitting balance/tolerance on EOB >5 min. with SBA-Mod A.    Patient left supine with all lines intact, call button in reach, and restraints reapplied at end of session.    GOALS:   Multidisciplinary Problems       Physical Therapy Goals          Problem: Physical Therapy    Goal Priority Disciplines Outcome Goal Variances Interventions   Physical Therapy Goal     PT, PT/OT Progressing     Description: Goals to be met by: 24     Patient will increase functional independence with mobility by performin. Supine to sit with Stand-by Assistance  2. Sit to stand transfer with Minimal Assistance  3. Bed to chair transfer with Minimal Assistance using Rolling Walker  4. Gait  x 10 feet with Minimal Assistance using Rolling Walker.   5. Wheelchair propulsion x100 feet with Supervision using bilateral upper  extremities  6. Stand for 5 minutes with Contact Guard Assistance using Rolling Walker    Patient has a mobility limitation that significantly impairs their ability to participate in one or more mobility related activities of daily living in customary locations in the home. The mobility limitation cannot be sufficiently resolved by the use of a cane or walker. The use of a manual wheelchair will greatly improve the patient's ability to participate in MRADLs. The patient will use the wheelchair on a regular basis at home. They have expressed their willingness to use a manual wheelchair in the home, and have a caregiver who is available and willing to assist with the wheelchair if needed.                         History:     Past Medical History:   Diagnosis Date    Allergy     Altered mental status 06/19/2022    DYSARTHRIA, SPASTIC MOVEMENTS & DIFFICULTY SWALLOWING    Anemia     Anxiety     Arthritis     Cataract     both removed    Colon polyps     Coronary artery disease     Depression     Diabetes mellitus, type II     Disorder of kidney and ureter     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/2023    Fibromyalgia     Follicular lymphoma     GERD (gastroesophageal reflux disease)     HTN (hypertension)     Hyperlipidemia     MI (myocardial infarction) 03/2019    Personal history of colonic polyps     Restless leg syndrome     Stroke        Past Surgical History:   Procedure Laterality Date    COLONOSCOPY  11/07/2012    Colon polyp found; repeat in 5 years    ELBOW SURGERY Right 2015    dislocation repair     ESOPHAGOGASTRODUODENOSCOPY  11/07/2012    atrophic gastritis, H pylori testing negative    INCISION AND DRAINAGE FOOT Right 6/2/2021    Procedure: INCISION AND DRAINAGE, FOOT, bone biopsy;  Surgeon: Quiana Penn DPM;  Location: [facility];  Service: Podiatry;  Laterality: Right;    KNEE SURGERY Bilateral 2015    scoped    LEFT HEART CATHETERIZATION Left 3/29/2019    Procedure: Left heart cath;  Surgeon: Bladimir WHITING  MD Chelsey;  Location: [facility] CATH LAB;  Service: Cardiology;  Laterality: Left;    LEFT HEART CATHETERIZATION Left 11/18/2019    Procedure: Left heart cath;  Surgeon: Karl Rico MD;  Location: [facility] CATH LAB;  Service: Cardiology;  Laterality: Left;    LEFT HEART CATHETERIZATION Left 1/8/2020    Procedure: Left heart cath, right radial, noon start;  Surgeon: Christos Monreal MD;  Location: [facility] CATH LAB;  Service: Cardiology;  Laterality: Left;  RN Pre Op 1-6-20.  To be admitted 1-7-20 sor Aspirin Disensitation    OPEN REDUCTION AND INTERNAL FIXATION (ORIF) OF PILON FRACTURE Right 8/14/2024    Procedure: ORIF, FRACTURE, PILON;  Surgeon: Neto Davalos MD;  Location: [address];  Service: Orthopedics;  Laterality: Right;    TONSILLECTOMY  1955    ULTRASOUND GUIDANCE  1/8/2020    Procedure: Ultrasound Guidance;  Surgeon: Christos Monreal MD;  Location: [facility] CATH LAB;  Service: Cardiology;;       Time Tracking:     PT Received On: 08/18/24  PT Start Time: 0926     PT Stop Time: 0949  PT Total Time (min): 23 min     Billable Minutes: Re-eval 10 and Therapeutic Activity 13      08/18/2024

## 2024-08-18 NOTE — ASSESSMENT & PLAN NOTE
Creatine stable for now. BMP reviewed- noted Estimated Creatinine Clearance: 30.1 mL/min (A) (based on SCr of 1.9 mg/dL (H)). according to latest data. Based on current GFR, CKD stage is stage 4 - GFR 15-29.  Monitor UOP and serial BMP and adjust therapy as needed. Renally dose meds. Avoid nephrotoxic medications and procedures.  - Cr 1.9 (near most recent baseline which is 1.6 to 1.8) on admit but 2.2 now so will do light IVF on 8/15 but slightly worse at 2.4 now with bobbi and closer to baseline 8/17 at 1.7-->1.9 now  - daily BMP  - avoid nephrotoxic medications

## 2024-08-18 NOTE — CONSULTS
"CONSULTATION LIAISON PSYCHIATRY INITIAL EVALUATION    Patient Name: Lorena Contreras  MRN: [id number]  Patient Class: IP- Inpatient  Admission Date: 8/13/2024  Attending Physician: Rupal Ochoa MD      HPI:   Lorena Contreras is a 73 y.o. female with past psychiatric history of anxiety & past pertinent medical history of fibromyalgia, HTN, HLD, CAD, MI, CVA, and T2DM presents to the ED/admitted to the hospital for Closed right pilon fracture after a fall     Psychiatry consulted for "Hyperactive delirium with high anxiety baseline with hallucinations in restraints no sleep >48 hours, failing prns, uti, recs for other PrN management in interim for assistance to avoid adverse events"    On psych exam, patient calm and cooperative, oriented to person and place, but not time. Evidence of active visual hallucinations noted throughout exam. Patient oriented to situation, understanding she is in the hospital for injuries sustained after a fall. She reports anxiety at baseline, which was intermittently treated w/ PO Ativan 1 mg prescribed by her PCP, supported by PCP w/ last refill 5/2024. She says her PCP recommended evaluation by psychiatry for further treatment. She was opposed to this, stating "I'm not as crazy as them other people". She admits to depression, but denies SI, intent, plan, or prior SA. Denies AVH, though evidence of RIS observed. Okay with continuing Prozac,as it has helped her in the past.       Collateral with patient's permission:   Per daughter Brooke and pt's sister at bedside, the patient has not slept since her surgery 2 days ago. She has been seeing visions of "cats and little boys running in the room". She had to be placed in restraints because she was pulling at her incisions and pulled out her IVs. They deny history of dementia or cognitive impairment. They report she has been depressed and lonely since the death of her  4 years ago. Despite this, she continues to engage in " "hobbies of making art w/ bottle caps and spending time with family. They have been encouraging her to see a psychiatrist, but she is adamantly opposed.     Medical Review of Systems:  Review of systems not obtained due to patient factors patient hallucinating cats are on her legs.    Psychiatric Review of Systems (is patient experiencing or having changes in):  sleep: yes  appetite: yes  weight: unknown  energy/anergy: yes  interest/pleasure/anhedonia: no  somatic symptoms: no  libido: not asked   anxiety/panic: yes  guilty/hopelessness: no  concentration: yes  Carol:no  Psychosis: yes  Trauma: no  S.I.B.s/risky behavior: no    Past Psychiatric History:  Previous Medication Trials: yes, Prozac   Previous Psychiatric Hospitalizations:no   Previous Suicide Attempts: no  History of Violence: no  Outpatient Psychiatrist: no  Family Psychiatric History: unknown     Substance Abuse History (with emphasis over the last 12 months):  Recreational Drugs:  denies   Use of Alcohol: denied  Tobacco Use:no  Rehab History:no    Social History:  Marital Status:   Children: 2  Employment Status/Info: retired  :unknown   Education:  unknown   Special Ed: unknown   Housing Status: with family: grandson and his daughter   Access to gun: no  Psychosocial Stressors: health  Functioning Relationships: good support system    Legal History:  Past Charges/Incarcerations: no  Pending charges:no    Mental Status Exam:  General Appearance: appears stated age, well developed and nourished, adequately groomed and appropriately dressed, in no acute distress  Behavior: normal; cooperative; reasonably friendly, pleasant, and polite; appropriate eye-contact; under good behavioral control  Involuntary Movements and Motor Activity: +psychomotor agitation  Gait and Station: unable to assess - patient lying down or seated  Speech and Language: normal rate, rhythm, volume, tone, and pitch  Mood: "alright"  Affect: anxious  Thought Process " and Associations:  grossly linear w/ episodic loosening of associations   Thought Content and Perceptions:: no suicidal ideation, + visual hallucinations  Sensorium and Orientation: alert, oriented to person and place  Recent and Remote Memory: grossly intact, able to recall relevant and salient information from the recent and remote past  Attention and Concentration: easily distractible, able to spell WORLD forwards, unable to spell WORLD backwards  Fund of Knowledge: grossly intact  Insight: limited/partial awareness of illness  Judgment: impaired    CAM ICU positive? No       ASSESSMENT & RECOMMENDATIONS   Delirium due to another medical condition  Hx of anxiety     PSYCH MEDICATIONS  Start Depacon 250 mg IV TID  Start Seroquel 50 mg qhs   Schedule Melatonin 6 mg qhs   QTc 8/13: 504 ms. F/U repeat ECG   If no improvement in AMS, recommend pursuing encephalopathic work up w/ vitamin levels, TSH, infectious etiologies, etc      DELIRIUM  DELIRIUM BEHAVIOR MANAGEMENT  PLEASE utilize CHEMICAL restraints with PRN meds first for agitation. Minimize use of PHYSICAL restraints OR have periods of being out of physical restraints if possible.  Keep window shades open and room lit during day and room dim at night in order to promote normal sleep-wake cycles  Encourage family at bedside. [place] patient often to situation, location, date.  Continue to Limit or Discontinue use of Narcotics, Benzos and Anti-cholinergic medications as they may worsen delirium.  Continue medical workup for causative etiology of Delirium.     OTHER PERTINENT DIAGNOSIS  UTI, UCx growing e. coli and e. faecalis, currently on antibiotics     Pelvis and right ankle fractures, managed surgically w/ daily opioids for pain control       RISK ASSESSMENT  NO NEED FOR PEC patient NOT in any imminent danger of hurting self or others and not gravely disabled.     FOLLOW UP  Will follow up while in house    DISPOSITION - once medically cleared:    Defer to  medical team    Please contact ON CALL psychiatry service (24/7) for any acute issues that may arise.    Dr. Josep JEFFERSON Psychiatry  [facility]  8/18/2024 8:46 AM        --------------------------------------------------------------------------------------------------------------------------------------------------------------------------------------------------------------------------------------    CONTINUED HISTORY & OBJECTIVE clinical data & findings reviewed and noted for above decision making    Past Medical/Surgical History:   Past Medical History:   Diagnosis Date    Allergy     Altered mental status 06/19/2022    DYSARTHRIA, SPASTIC MOVEMENTS & DIFFICULTY SWALLOWING    Anemia     Anxiety     Arthritis     Cataract     both removed    Colon polyps     Coronary artery disease     Depression     Diabetes mellitus, type II     Disorder of kidney and ureter     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/2023    Fibromyalgia     Follicular lymphoma     GERD (gastroesophageal reflux disease)     HTN (hypertension)     Hyperlipidemia     MI (myocardial infarction) 03/2019    Personal history of colonic polyps     Restless leg syndrome     Stroke      Past Surgical History:   Procedure Laterality Date    COLONOSCOPY  11/07/2012    Colon polyp found; repeat in 5 years    ELBOW SURGERY Right 2015    dislocation repair     ESOPHAGOGASTRODUODENOSCOPY  11/07/2012    atrophic gastritis, H pylori testing negative    INCISION AND DRAINAGE FOOT Right 6/2/2021    Procedure: INCISION AND DRAINAGE, FOOT, bone biopsy;  Surgeon: Quiana Penn DPM;  Location: [facility] OR;  Service: Podiatry;  Laterality: Right;    KNEE SURGERY Bilateral 2015    scoped    LEFT HEART CATHETERIZATION Left 3/29/2019    Procedure: Left heart cath;  Surgeon: Bladimir Barbosa MD;  Location: [facility] CATH LAB;  Service: Cardiology;  Laterality: Left;    LEFT HEART CATHETERIZATION Left 11/18/2019    Procedure: Left heart cath;  Surgeon: Karl  NICOLE Rico MD;  Location: [facility] CATH LAB;  Service: Cardiology;  Laterality: Left;    LEFT HEART CATHETERIZATION Left 1/8/2020    Procedure: Left heart cath, right radial, noon start;  Surgeon: Christos Monreal MD;  Location: [facility] CATH LAB;  Service: Cardiology;  Laterality: Left;  RN Pre Op 1-6-20.  To be admitted 1-7-20 sor Aspirin Disensitation    OPEN REDUCTION AND INTERNAL FIXATION (ORIF) OF PILON FRACTURE Right 8/14/2024    Procedure: ORIF, FRACTURE, PILON;  Surgeon: Neto Davalos MD;  Location: [address];  Service: Orthopedics;  Laterality: Right;    TONSILLECTOMY  1955    ULTRASOUND GUIDANCE  1/8/2020    Procedure: Ultrasound Guidance;  Surgeon: Christos Monreal MD;  Location: [facility] CATH LAB;  Service: Cardiology;;       Current Medications:   Scheduled Meds:    acetaminophen  1,000 mg Oral Q8H    apixaban  2.5 mg Oral BID    calcium carbonate  1,000 mg Oral Daily    cefTRIAXone (Rocephin) IV (PEDS and ADULTS)  1 g Intravenous Q24H    FLUoxetine  40 mg Oral Daily    insulin aspart U-100  13 Units Subcutaneous TIDWM    insulin glargine U-100  28 Units Subcutaneous QHS    isosorbide mononitrate  60 mg Oral Daily    methocarbamoL  500 mg Oral QID    metoprolol succinate  25 mg Oral Daily    pantoprazole  40 mg Oral Daily    sodium bicarbonate  650 mg Oral BID     PRN Meds:   Current Facility-Administered Medications:     0.9%  NaCl infusion (for blood administration), , Intravenous, Q24H PRN    0.9%  NaCl infusion (for blood administration), , Intravenous, Q24H PRN    albuterol-ipratropium, 3 mL, Nebulization, Q4H PRN    bisacodyL, 10 mg, Rectal, Daily PRN    dextrose 10%, 12.5 g, Intravenous, PRN    dextrose 10%, 12.5 g, Intravenous, PRN    dextrose 10%, 12.5 g, Intravenous, PRN    dextrose 10%, 25 g, Intravenous, PRN    dextrose 10%, 25 g, Intravenous, PRN    dextrose 10%, 25 g, Intravenous, PRN    glucagon (human recombinant), 1 mg, Intramuscular, PRN    glucagon (human recombinant), 1 mg,  Intramuscular, PRN    glucose, 16 g, Oral, PRN    glucose, 16 g, Oral, PRN    glucose, 24 g, Oral, PRN    glucose, 24 g, Oral, PRN    hydrOXYzine HCL, 25 mg, Oral, TID PRN    insulin aspart U-100, 0-10 Units, Subcutaneous, QID (AC + HS) PRN    melatonin, 6 mg, Oral, Nightly PRN    morphine, 2 mg, Intravenous, Q6H PRN    ondansetron, 8 mg, Oral, Q8H PRN    oxyCODONE, 5 mg, Oral, Q6H PRN    oxyCODONE, 10 mg, Oral, Q6H PRN    polyethylene glycol, 17 g, Oral, Daily PRN    promethazine, 25 mg, Oral, Q6H PRN    sodium chloride 0.9%, 10 mL, Intravenous, PRN    Pharmacy to dose Vancomycin consult, , , Once **AND** vancomycin - pharmacy to dose, , Intravenous, pharmacy to manage frequency    Allergies:   Review of patient's allergies indicates:   Allergen Reactions    Novolin 70/30 (semi-synthetic) Nausea And Vomiting     Severe vomiting on Relion 70/30    Sulfa (sulfonamide antibiotics) Anaphylaxis    Talwin [pentazocine lactate] Anaphylaxis    Victoza [liraglutide] Nausea And Vomiting    Glipizide Nausea Only    Citric acid     Codeine     Influenza virus vaccines Hives    Iodine and iodide containing products Hives    Levetiracetam Itching    Rituxan [rituximab] Hives    Zoloft [sertraline] Nausea And Vomiting       Vitals  Vitals:    08/18/24 0744   BP: (!) 143/65   Pulse: 93   Resp: 16   Temp: 99.4 °F (37.4 °C)       Labs/Imaging/Studies:  Recent Results (from the past 24 hour(s))   POCT glucose    Collection Time: 08/17/24 11:13 AM   Result Value Ref Range    POCT Glucose 193 (H) 70 - 110 mg/dL   POCT glucose    Collection Time: 08/17/24  4:24 PM   Result Value Ref Range    POCT Glucose 259 (H) 70 - 110 mg/dL   POCT glucose    Collection Time: 08/17/24  8:21 PM   Result Value Ref Range    POCT Glucose 269 (H) 70 - 110 mg/dL   CBC Auto Differential    Collection Time: 08/18/24  5:48 AM   Result Value Ref Range    WBC 10.48 3.90 - 12.70 K/uL    RBC 3.70 (L) 4.00 - 5.40 M/uL    Hemoglobin 10.8 (L) 12.0 - 16.0 g/dL     Hematocrit 32.4 (L) 37.0 - 48.5 %    MCV 88 82 - 98 fL    MCH 29.2 27.0 - 31.0 pg    MCHC 33.3 32.0 - 36.0 g/dL    RDW 15.8 (H) 11.5 - 14.5 %    Platelets 178 150 - 450 K/uL    MPV 13.1 (H) 9.2 - 12.9 fL    Immature Granulocytes 0.6 (H) 0.0 - 0.5 %    Gran # (ANC) 8.3 (H) 1.8 - 7.7 K/uL    Immature Grans (Abs) 0.06 (H) 0.00 - 0.04 K/uL    Lymph # 0.9 (L) 1.0 - 4.8 K/uL    Mono # 1.1 (H) 0.3 - 1.0 K/uL    Eos # 0.1 0.0 - 0.5 K/uL    Baso # 0.06 0.00 - 0.20 K/uL    nRBC 0 0 /100 WBC    Gran % 79.1 (H) 38.0 - 73.0 %    Lymph % 8.2 (L) 18.0 - 48.0 %    Mono % 10.5 4.0 - 15.0 %    Eosinophil % 1.0 0.0 - 8.0 %    Basophil % 0.6 0.0 - 1.9 %    Differential Method Automated    POCT glucose    Collection Time: 08/18/24  7:35 AM   Result Value Ref Range    POCT Glucose 208 (H) 70 - 110 mg/dL     Imaging Results              CT Ankle (Including Hindfoot) Without Contrast Right (Final result)  Result time 08/14/24 04:49:46      Final result by Portillo Oquendo MD (08/14/24 04:49:46)                   Impression:      Near anatomic alignment of complex mildly displaced distal tibia fracture and mildly displaced distal fibular fracture.    Electronically signed by resident: Hira Patel  Date:    08/14/2024  Time:    03:56    Electronically signed by: Portillo Oquendo MD  Date:    08/14/2024  Time:    04:49               Narrative:    EXAMINATION:  CT ANKLE (INCLUDING HINDFOOT) WITHOUT CONTRAST RIGHT; CT 3D RENDERING WO INDEPENDENT WORKSTATION    CLINICAL HISTORY:  Fracture, ankle;; Fracture, ankle;per order request;    TECHNIQUE:  Axial 0.625-mm images of the right ankle obtained without intravenous contrast.  Data submitted for coronal and sagittal reformats.  3D reconstructed images were acquired by post processing on an independent workstation with concurrent supervision and archived for permanent record. 3D imaging of the right ankle was obtained for further evaluation and operative planning if  needed.    COMPARISON:  Ankle radiograph 03/14/2024.    FINDINGS:  Bones are demineralized.  There is a cast in place.  There is mildly displaced comminuted distal tibial fracture and a minimally displaced distal fibular fracture with medial angulation of the fracture fragment.  Intra-articular extension fracture fragments which are mildly displaced involving the distal tibia near anatomic alignment of fracture fragments.  Fractures involve the posterior and medial malleoli.  No dislocation.  There are a few foci of subcutaneous gas overlying the tibial fracture, possibly relating to trauma or reduction procedure though correlation is advised.  Dense calcific tendinosis of the posterior tibial tendon.  No full-thickness injury of the Achilles tendon.  Soft tissue edema about the ankle.  Prominent vascular calcifications noted.                                       CT 3D Rendering WO Independent Workstation (Final result)  Result time 08/14/24 04:49:46      Final result by Portilol Oquendo MD (08/14/24 04:49:46)                   Impression:      Near anatomic alignment of complex mildly displaced distal tibia fracture and mildly displaced distal fibular fracture.    Electronically signed by resident: Hira Patel  Date:    08/14/2024  Time:    03:56    Electronically signed by: Portillo Oquendo MD  Date:    08/14/2024  Time:    04:49               Narrative:    EXAMINATION:  CT ANKLE (INCLUDING HINDFOOT) WITHOUT CONTRAST RIGHT; CT 3D RENDERING WO INDEPENDENT WORKSTATION    CLINICAL HISTORY:  Fracture, ankle;; Fracture, ankle;per order request;    TECHNIQUE:  Axial 0.625-mm images of the right ankle obtained without intravenous contrast.  Data submitted for coronal and sagittal reformats.  3D reconstructed images were acquired by post processing on an independent workstation with concurrent supervision and archived for permanent record. 3D imaging of the right ankle was obtained for further evaluation and  operative planning if needed.    COMPARISON:  Ankle radiograph 03/14/2024.    FINDINGS:  Bones are demineralized.  There is a cast in place.  There is mildly displaced comminuted distal tibial fracture and a minimally displaced distal fibular fracture with medial angulation of the fracture fragment.  Intra-articular extension fracture fragments which are mildly displaced involving the distal tibia near anatomic alignment of fracture fragments.  Fractures involve the posterior and medial malleoli.  No dislocation.  There are a few foci of subcutaneous gas overlying the tibial fracture, possibly relating to trauma or reduction procedure though correlation is advised.  Dense calcific tendinosis of the posterior tibial tendon.  No full-thickness injury of the Achilles tendon.  Soft tissue edema about the ankle.  Prominent vascular calcifications noted.                                       X-Ray Ankle Complete Right (Final result)  Result time 08/14/24 03:44:09      Final result by Portillo Oquendo MD (08/14/24 03:44:09)                   Impression:      Similar alignment of distal tibia and distal fibula fractures post reduction.      Electronically signed by: Portillo Oquendo MD  Date:    08/14/2024  Time:    03:44               Narrative:    EXAMINATION:  XR ANKLE COMPLETE 3 VIEW RIGHT    CLINICAL HISTORY:  Post reduction;    TECHNIQUE:  AP, lateral, and oblique images of the right ankle were performed.    COMPARISON:  08/13/2024.    FINDINGS:  Exam quality is limited by suboptimal positioning and overlapping cast material.  Acute fractures of the distal tibia and distal fibula as seen previously.  Bones are demineralized.  No gross dislocation or significant worsening alignment of fracture fragments.  Soft tissue edema.                                       CT Pelvis Without Contrast (Final result)  Result time 08/13/24 20:58:12      Final result by Quiana Chawla MD (08/13/24 20:58:12)                    Impression:      Acute traumatic fractures involving the right iliac wing, the anterior pillar of the right acetabulum and the lateral most aspect of the right superior ramus, and the right inferior pubic ramus.  No hip dislocation.      Electronically signed by: Quiana Chawla  Date:    08/13/2024  Time:    20:58               Narrative:    EXAMINATION:  CT PELVIS WITHOUT CONTRAST    CLINICAL HISTORY:  Pelvic trauma;    TECHNIQUE:  1.25 mm axial images were obtained through the pelvis from above the iliac crest through the upper femoral shafts.    COMPARISON:  Plain hip radiograph 08/13/2020 full    FINDINGS:  There is a minimally displaced fracture of the right iliac wing.  There are comminuted fractures involving the anterior pillar of the right acetabulum and the lateral most aspect of the right superior ramus.  There is comminuted and overlapping fracture of the right inferior pubic ramus.  The hips are not dislocated.  The SI joints are intact.  There are degenerative changes seen at the sacroiliac joints and the pubic symphysis.  Bones are osteopenic.  Incidental note is made of vascular calcifications and a small fat containing umbilical hernia.                                       CT Lumbar Spine Without Contrast (Final result)  Result time 08/13/24 20:31:47      Final result by Quiana Chawla MD (08/13/24 20:31:47)                   Impression:      No acute lumbar fracture.  Multilevel degenerative change greatest at L4/L5.    Small bilateral pleural effusions right greater than left.    Gallbladder sludge or gravel-like gallstones.      Electronically signed by: Quiana Chawla  Date:    08/13/2024  Time:    20:31               Narrative:    EXAMINATION:  CT OF THE LUMBAR SPINE WITHOUT    CLINICAL HISTORY:  Complaining of right hip pain secondary to fall from standing.    TECHNIQUE:  1.25 mm axial images were obtained through the lumbar spine. Coronal and sagittal reformatted images were  provided.    COMPARISON:  None.    FINDINGS:  There is no lumbar vertebral body fracture.  There is grade 1 anterolisthesis of L4 on L5.  At L3/L4, there is moderate central canal narrowing secondary to the facet arthrosis and ligamentum flavum hypertrophy without significant foraminal stenosis.  At L4/L5, there is no stone significant central canal narrowing, but there is bilateral mild foraminal narrowing.  Secondary to ligamentum flavum hypertrophy and facet arthrosis.  At L5/S1, there is a broad-based disc bulge without any significant central canal stenosis or foraminal stenosis.  There is small marginal osteophytes throughout.  Vacuum phenomena is seen at L4/L5 with mild intervertebral disc space narrowing.  Incidental note is made of small bilateral pleural effusions right greater than left and gallbladder sludge or gravel-like gallstones.                                       X-Ray Chest AP Portable (Final result)  Result time 08/13/24 17:49:32      Final result by Eddie Montilla MD (08/13/24 17:49:32)                   Impression:      1. Interstitial findings may reflect edema versus chronic change, no large focal consolidation.      Electronically signed by: Eddie Montilla MD  Date:    08/13/2024  Time:    17:49               Narrative:    EXAMINATION:  XR CHEST AP PORTABLE    CLINICAL HISTORY:  Chest Pain;    TECHNIQUE:  Single frontal view of the chest was performed.    COMPARISON:  11/03/2023    FINDINGS:  The cardiomediastinal silhouette is prominent, magnified by technique, similar to the previous exam noting calcification of the aorta..  There is no pleural effusion.  The trachea is midline.  The lungs are symmetrically expanded bilaterally with coarse interstitial attenuation in a central hilar distribution..  No large focal consolidation seen.  There is no pneumothorax.  The osseous structures are remarkable for degenerative changes..                                       X-Ray Ankle  Complete Right (Final result)  Result time 08/13/24 17:50:39      Final result by Eddie Montilla MD (08/13/24 17:50:39)                   Impression:      1. Distal tibial and fibular fractures as described.      Electronically signed by: Eddie Montilla MD  Date:    08/13/2024  Time:    17:50               Narrative:    EXAMINATION:  XR ANKLE COMPLETE 3 VIEW RIGHT    CLINICAL HISTORY:  Unspecified fall, initial encounter    TECHNIQUE:  AP, lateral, and oblique images of the right ankle were performed.    COMPARISON:  Radiograph of the foot 06/01/2021    FINDINGS:  Three views right ankle.    There is osteopenia.  There is acute appearing fracture involving the distal aspect of the fibula.  There is comminuted fracture of the medial malleolus.  The ankle mortise is intact.  There is edema about the ankle.  The posterior aspect of the tibia appears intact.  There are degenerative changes of the calcaneus.  There is vascular calcification.                                       X-Ray Hip 2 or 3 views Right with Pelvis when performed (Final result)  Result time 08/13/24 17:53:13      Final result by Eddie Montilla MD (08/13/24 17:53:13)                   Impression:      1. Fractures of the right inferior and superior pubic rami.  2. There is cortical irregularity involving the right iliac wing, concerning for additional fracture.      Electronically signed by: Eddie Montilla MD  Date:    08/13/2024  Time:    17:53               Narrative:    EXAMINATION:  XR HIP WITH PELVIS WHEN PERFORMED 2 OR 3 VIEWS RIGHT    CLINICAL HISTORY:  Unspecified fall, initial encounter    TECHNIQUE:  AP view of the pelvis and frog leg lateral view of the right hip were performed.    COMPARISON:  None    FINDINGS:  Three views right hip.    The bilateral sacroiliac joints are intact.  The pubic symphysis is intact.  There is osteopenia.  There are fractures of the right superior and inferior pubic rami.  The bilateral  femoroacetabular joints are intact noting degenerative change.  There is fracture involving the right iliac wing.                                       X-Ray Knee 1 or 2 View Right (Final result)  Result time 08/13/24 17:53:54   Procedure changed from X-Ray Knee 3 View Right     Final result by Eddie Montilla MD (08/13/24 17:53:54)                   Impression:      1. No acute displaced fracture or dislocation of the knee.      Electronically signed by: Eddie Montilla MD  Date:    08/13/2024  Time:    17:53               Narrative:    EXAMINATION:  XR KNEE 1 OR 2 VIEW RIGHT    CLINICAL HISTORY:  Pain;  Unspecified fall, initial encounter    TECHNIQUE:  AP and lateral views of the right knee were performed.    COMPARISON:  11/20/2014    FINDINGS:  Two views right knee.    There is osteopenia.  There are degenerative changes of the knee.  There is vascular calcification.  No large right knee joint effusion.

## 2024-08-18 NOTE — SUBJECTIVE & OBJECTIVE
Principal Problem:Closed right pilon fracture    Principal Orthopedic Problem: same + R both column acetabular fracture    Interval History: Pt seen and examined at bedside. Patient remains confused this morning. She is A&Ox1 and oriented to person only. Remains in restraints. Hypertensive overnight. Denies any numbness or tingling. Patient has not been sleeping at night.       Review of patient's allergies indicates:   Allergen Reactions    Novolin 70/30 (semi-synthetic) Nausea And Vomiting     Severe vomiting on Relion 70/30    Sulfa (sulfonamide antibiotics) Anaphylaxis    Talwin [pentazocine lactate] Anaphylaxis    Victoza [liraglutide] Nausea And Vomiting    Glipizide Nausea Only    Citric acid     Codeine     Influenza virus vaccines Hives    Iodine and iodide containing products Hives    Levetiracetam Itching    Rituxan [rituximab] Hives    Zoloft [sertraline] Nausea And Vomiting       Current Facility-Administered Medications   Medication    0.9%  NaCl infusion (for blood administration)    0.9%  NaCl infusion (for blood administration)    acetaminophen tablet 1,000 mg    albuterol-ipratropium 2.5 mg-0.5 mg/3 mL nebulizer solution 3 mL    aluminum & magnesium hydroxide-simethicone 400-400-40 mg/5 mL suspension 30 mL    apixaban tablet 2.5 mg    bisacodyL suppository 10 mg    bisacodyL suppository 10 mg    calcium carbonate 200 mg calcium (500 mg) chewable tablet 1,000 mg    calcium carbonate 200 mg calcium (500 mg) chewable tablet 500 mg    cefTRIAXone (Rocephin) 1 g in D5W 100 mL IVPB (MB+)    dextrose 10% bolus 125 mL 125 mL    dextrose 10% bolus 125 mL 125 mL    dextrose 10% bolus 125 mL 125 mL    dextrose 10% bolus 250 mL 250 mL    dextrose 10% bolus 250 mL 250 mL    dextrose 10% bolus 250 mL 250 mL    FLUoxetine capsule 40 mg    glucagon (human recombinant) injection 1 mg    glucagon (human recombinant) injection 1 mg    glucose chewable tablet 16 g    glucose chewable tablet 16 g    glucose chewable  "tablet 24 g    glucose chewable tablet 24 g    hydrOXYzine HCL tablet 25 mg    insulin aspart U-100 pen 0-10 Units    insulin aspart U-100 pen 13 Units    insulin glargine U-100 (Lantus) pen 28 Units    isosorbide mononitrate 24 hr tablet 60 mg    melatonin tablet 6 mg    methocarbamoL tablet 500 mg    metoprolol succinate (TOPROL-XL) 24 hr tablet 25 mg    morphine injection 2 mg    ondansetron disintegrating tablet 8 mg    oxyCODONE immediate release tablet 5 mg    oxyCODONE immediate release tablet Tab 10 mg    pantoprazole EC tablet 40 mg    polyethylene glycol packet 17 g    promethazine tablet 25 mg    sodium bicarbonate tablet 650 mg    sodium chloride 0.9% flush 10 mL    vancomycin - pharmacy to dose     Objective:     Vital Signs (Most Recent):  Temp: 99.4 °F (37.4 °C) (08/18/24 0744)  Pulse: 93 (08/18/24 0744)  Resp: 16 (08/18/24 0744)  BP: (!) 143/65 (08/18/24 0744)  SpO2: (!) 94 % (08/18/24 0744) Vital Signs (24h Range):  Temp:  [98.4 °F (36.9 °C)-99.4 °F (37.4 °C)] 99.4 °F (37.4 °C)  Pulse:  [81-98] 93  Resp:  [16-20] 16  SpO2:  [91 %-99 %] 94 %  BP: (141-182)/(60-82) 143/65     Weight: 81.2 kg (179 lb 0.2 oz)  Height: 5' 9" (175.3 cm)  Body mass index is 26.44 kg/m².      Intake/Output Summary (Last 24 hours) at 8/18/2024 1057  Last data filed at 8/18/2024 0606  Gross per 24 hour   Intake 646.25 ml   Output 1800 ml   Net -1153.75 ml          Ortho/SPM Exam     Gen: NAD, WDWN  CV: peripherally well perfused  Resp: unlabored respirations, symmetric chest rise    MSK:  RLE:  Surgical dressings over right hip and abdomen c/d/I  Posterior slab splint in place  Fires Quad, wiggles toes  SILT  Compartments soft  Brisk cap refill          Significant Labs: CBC:   Recent Labs   Lab 08/16/24 1924 08/17/24  0430 08/18/24  0548   WBC 16.38* 10.36 10.48   HGB 8.9* 6.8* 10.8*   HCT 27.7* 20.9* 32.4*    140* 178     CMP:   Recent Labs   Lab 08/17/24  0430 08/18/24  0548   * 135*   K 3.5 4.9   * 105 "   CO2 15* 20*   * 190*   BUN 39* 42*   CREATININE 1.7* 1.9*   CALCIUM 6.2* 8.5*   PROT 3.9* 6.1   ALBUMIN 1.3* 1.9*   BILITOT 0.4 0.5   ALKPHOS 107 159*   AST 38 50*   ALT <5* 9*   ANIONGAP 6* 10     All pertinent labs within the past 24 hours have been reviewed.    Significant Imaging: I have reviewed all pertinent imaging results/findings.

## 2024-08-18 NOTE — ASSESSMENT & PLAN NOTE
- hx noted  - continue home fluoxetine   High anxiety at baseline per her and daugher, doctors have been hesitant to do acute anxiety meds due to fall risk they said, was not on on admit, high anxiety 8/15, will try ativan x 1 as suspect having possible panic attack after therapy sesion based on nusring description and did better after this  Very anxious in pacu and anesthesia bedside, giving precedex x 1 to relex,and required zyprexa on 8/16 PM and restraints to calm down as confused and anxious.  Will give ativan once as showing signs 8/17 of panic with chest pressure and tingling.  Now more delirious in later 8/17 pm and 8/18 worsening, talking to wall/etc. See delirium

## 2024-08-18 NOTE — ASSESSMENT & PLAN NOTE
Anemia is likely due to acute blood loss which was from surgery . Most recent hemoglobin and hematocrit are listed below.  Recent Labs     08/16/24  1924 08/17/24  0430 08/18/24  0548   HGB 8.9* 6.8* 10.8*   HCT 27.7* 20.9* 32.4*       Plan  - Monitor serial CBC: Daily  - Transfuse PRBC if patient becomes hemodynamically unstable, symptomatic or H/H drops below 7/21.  - Patient has received 2 units of PRBCs on 8/17  - Patient's anemia is currently  downtrend post op  Expected with large surgery  Improved to 10 on 8/18 now after 2 U

## 2024-08-18 NOTE — NURSING
Nurses Note -- 4 Eyes      8/18/2024   4:05 AM      Skin assessed during: Daily Assessment      [x] No Altered Skin Integrity Present    []Prevention Measures Documented      [] Yes- Altered Skin Integrity Present or Discovered   [] LDA Added if Not in Epic (Describe Wound)   [] New Altered Skin Integrity was Present on Admit and Documented in LDA   [] Wound Image Taken    Wound Care Consulted? No    Attending Nurse:  Noelle RIVERA RN    Second RN/Staff Member:  DIONNA Anguiano

## 2024-08-18 NOTE — PROGRESS NOTES
[facility]  Orthopedics  Progress Note    Patient Name: Lorena Contreras  MRN: [id number]  Admission Date: 8/13/2024  Hospital Length of Stay: 4 days  Attending Provider: Rupal Ochoa MD  Primary Care Provider: Jorge Paris MD  Follow-up For: Procedure(s) (LRB):  ORIF, FRACTURE, ACETABULUM (Right)    Post-Operative Day: 2 Days Post-Op  Subjective:     Principal Problem:Closed right pilon fracture    Principal Orthopedic Problem: same + R both column acetabular fracture    Interval History: Pt seen and examined at bedside. Patient remains confused this morning. She is A&Ox1 and oriented to person only. Remains in restraints. Hypertensive overnight. Denies any numbness or tingling. Patient has not been sleeping at night.       Review of patient's allergies indicates:   Allergen Reactions    Novolin 70/30 (semi-synthetic) Nausea And Vomiting     Severe vomiting on Relion 70/30    Sulfa (sulfonamide antibiotics) Anaphylaxis    Talwin [pentazocine lactate] Anaphylaxis    Victoza [liraglutide] Nausea And Vomiting    Glipizide Nausea Only    Citric acid     Codeine     Influenza virus vaccines Hives    Iodine and iodide containing products Hives    Levetiracetam Itching    Rituxan [rituximab] Hives    Zoloft [sertraline] Nausea And Vomiting       Current Facility-Administered Medications   Medication    0.9%  NaCl infusion (for blood administration)    0.9%  NaCl infusion (for blood administration)    acetaminophen tablet 1,000 mg    albuterol-ipratropium 2.5 mg-0.5 mg/3 mL nebulizer solution 3 mL    aluminum & magnesium hydroxide-simethicone 400-400-40 mg/5 mL suspension 30 mL    apixaban tablet 2.5 mg    bisacodyL suppository 10 mg    bisacodyL suppository 10 mg    calcium carbonate 200 mg calcium (500 mg) chewable tablet 1,000 mg    calcium carbonate 200 mg calcium (500 mg) chewable tablet 500 mg    cefTRIAXone (Rocephin) 1 g in D5W 100 mL IVPB (MB+)    dextrose 10% bolus 125 mL 125 mL    dextrose  "10% bolus 125 mL 125 mL    dextrose 10% bolus 125 mL 125 mL    dextrose 10% bolus 250 mL 250 mL    dextrose 10% bolus 250 mL 250 mL    dextrose 10% bolus 250 mL 250 mL    FLUoxetine capsule 40 mg    glucagon (human recombinant) injection 1 mg    glucagon (human recombinant) injection 1 mg    glucose chewable tablet 16 g    glucose chewable tablet 16 g    glucose chewable tablet 24 g    glucose chewable tablet 24 g    hydrOXYzine HCL tablet 25 mg    insulin aspart U-100 pen 0-10 Units    insulin aspart U-100 pen 13 Units    insulin glargine U-100 (Lantus) pen 28 Units    isosorbide mononitrate 24 hr tablet 60 mg    melatonin tablet 6 mg    methocarbamoL tablet 500 mg    metoprolol succinate (TOPROL-XL) 24 hr tablet 25 mg    morphine injection 2 mg    ondansetron disintegrating tablet 8 mg    oxyCODONE immediate release tablet 5 mg    oxyCODONE immediate release tablet Tab 10 mg    pantoprazole EC tablet 40 mg    polyethylene glycol packet 17 g    promethazine tablet 25 mg    sodium bicarbonate tablet 650 mg    sodium chloride 0.9% flush 10 mL    vancomycin - pharmacy to dose     Objective:     Vital Signs (Most Recent):  Temp: 99.4 °F (37.4 °C) (08/18/24 0744)  Pulse: 93 (08/18/24 0744)  Resp: 16 (08/18/24 0744)  BP: (!) 143/65 (08/18/24 0744)  SpO2: (!) 94 % (08/18/24 0744) Vital Signs (24h Range):  Temp:  [98.4 °F (36.9 °C)-99.4 °F (37.4 °C)] 99.4 °F (37.4 °C)  Pulse:  [81-98] 93  Resp:  [16-20] 16  SpO2:  [91 %-99 %] 94 %  BP: (141-182)/(60-82) 143/65     Weight: 81.2 kg (179 lb 0.2 oz)  Height: 5' 9" (175.3 cm)  Body mass index is 26.44 kg/m².      Intake/Output Summary (Last 24 hours) at 8/18/2024 1057  Last data filed at 8/18/2024 0606  Gross per 24 hour   Intake 646.25 ml   Output 1800 ml   Net -1153.75 ml          Ortho/SPM Exam     Gen: NAD, WDWN  CV: peripherally well perfused  Resp: unlabored respirations, symmetric chest rise    MSK:  RLE:  Surgical dressings over right hip and abdomen c/d/I  Posterior " slab splint in place  Fires Quad, wiggles toes  SILT  Compartments soft  Brisk cap refill          Significant Labs: CBC:   Recent Labs   Lab 08/16/24  1924 08/17/24  0430 08/18/24  0548   WBC 16.38* 10.36 10.48   HGB 8.9* 6.8* 10.8*   HCT 27.7* 20.9* 32.4*    140* 178     CMP:   Recent Labs   Lab 08/17/24  0430 08/18/24  0548   * 135*   K 3.5 4.9   * 105   CO2 15* 20*   * 190*   BUN 39* 42*   CREATININE 1.7* 1.9*   CALCIUM 6.2* 8.5*   PROT 3.9* 6.1   ALBUMIN 1.3* 1.9*   BILITOT 0.4 0.5   ALKPHOS 107 159*   AST 38 50*   ALT <5* 9*   ANIONGAP 6* 10     All pertinent labs within the past 24 hours have been reviewed.    Significant Imaging: I have reviewed all pertinent imaging results/findings.  Assessment/Plan:     * Closed right pilon fracture  Lorena Contreras is a 73 y.o. female with R pilon fracture and R both column acetabular fracture, closed, NVI,    S/p ORIF R pilon 8/14/24    NWB RLE  Eliquis 2.5 BID x 10 weeks  Remove splint, change dressing, and place a well-padded short-leg fiberglass cast prior to hospital discharge.          Closed displaced fracture of right acetabulum  S/p ORIF R acetabulum 8/16    - NWB RLE x 10 weeks  - Eliquis 2.5 BID x 10 weeks  - leave dressings dry and intact  - Post op ancef  - dc chong  - Psych consulted for delirium jose Marroquin MD  Orthopedics  [facility] - Surgery

## 2024-08-19 LAB
ABO + RH BLD: NORMAL
ALBUMIN SERPL BCP-MCNC: 1.5 G/DL (ref 3.5–5.2)
ALP SERPL-CCNC: 132 U/L (ref 55–135)
ALT SERPL W/O P-5'-P-CCNC: 9 U/L (ref 10–44)
ANION GAP SERPL CALC-SCNC: 7 MMOL/L (ref 8–16)
AST SERPL-CCNC: 42 U/L (ref 10–40)
BASOPHILS # BLD AUTO: 0.04 K/UL (ref 0–0.2)
BASOPHILS NFR BLD: 0.6 % (ref 0–1.9)
BILIRUB SERPL-MCNC: 0.3 MG/DL (ref 0.1–1)
BLD GP AB SCN CELLS X3 SERPL QL: NORMAL
BLD PROD TYP BPU: NORMAL
BLD PROD TYP BPU: NORMAL
BLOOD UNIT EXPIRATION DATE: NORMAL
BLOOD UNIT EXPIRATION DATE: NORMAL
BLOOD UNIT TYPE CODE: 5100
BLOOD UNIT TYPE CODE: 5100
BLOOD UNIT TYPE: NORMAL
BLOOD UNIT TYPE: NORMAL
BUN SERPL-MCNC: 40 MG/DL (ref 8–23)
CALCIUM SERPL-MCNC: 7.9 MG/DL (ref 8.7–10.5)
CHLORIDE SERPL-SCNC: 109 MMOL/L (ref 95–110)
CO2 SERPL-SCNC: 21 MMOL/L (ref 23–29)
CODING SYSTEM: NORMAL
CODING SYSTEM: NORMAL
CREAT SERPL-MCNC: 1.7 MG/DL (ref 0.5–1.4)
CROSSMATCH INTERPRETATION: NORMAL
CROSSMATCH INTERPRETATION: NORMAL
DIFFERENTIAL METHOD BLD: ABNORMAL
DISPENSE STATUS: NORMAL
DISPENSE STATUS: NORMAL
EOSINOPHIL # BLD AUTO: 0.2 K/UL (ref 0–0.5)
EOSINOPHIL NFR BLD: 2.4 % (ref 0–8)
ERYTHROCYTE [DISTWIDTH] IN BLOOD BY AUTOMATED COUNT: 15.9 % (ref 11.5–14.5)
EST. GFR  (NO RACE VARIABLE): 31.5 ML/MIN/1.73 M^2
GLUCOSE SERPL-MCNC: 129 MG/DL (ref 70–110)
HCT VFR BLD AUTO: 28.6 % (ref 37–48.5)
HGB BLD-MCNC: 9 G/DL (ref 12–16)
IMM GRANULOCYTES # BLD AUTO: 0.05 K/UL (ref 0–0.04)
IMM GRANULOCYTES NFR BLD AUTO: 0.7 % (ref 0–0.5)
LYMPHOCYTES # BLD AUTO: 1.1 K/UL (ref 1–4.8)
LYMPHOCYTES NFR BLD: 16 % (ref 18–48)
MAGNESIUM SERPL-MCNC: 1.8 MG/DL (ref 1.6–2.6)
MCH RBC QN AUTO: 28.1 PG (ref 27–31)
MCHC RBC AUTO-ENTMCNC: 31.5 G/DL (ref 32–36)
MCV RBC AUTO: 89 FL (ref 82–98)
MONOCYTES # BLD AUTO: 0.7 K/UL (ref 0.3–1)
MONOCYTES NFR BLD: 9.8 % (ref 4–15)
NEUTROPHILS # BLD AUTO: 5 K/UL (ref 1.8–7.7)
NEUTROPHILS NFR BLD: 70.5 % (ref 38–73)
NRBC BLD-RTO: 0 /100 WBC
OHS QRS DURATION: 80 MS
OHS QTC CALCULATION: 493 MS
PHOSPHATE SERPL-MCNC: 3.4 MG/DL (ref 2.7–4.5)
PLATELET # BLD AUTO: 166 K/UL (ref 150–450)
PMV BLD AUTO: 12.6 FL (ref 9.2–12.9)
POCT GLUCOSE: 113 MG/DL (ref 70–110)
POCT GLUCOSE: 114 MG/DL (ref 70–110)
POCT GLUCOSE: 118 MG/DL (ref 70–110)
POCT GLUCOSE: 211 MG/DL (ref 70–110)
POTASSIUM SERPL-SCNC: 4.1 MMOL/L (ref 3.5–5.1)
PROT SERPL-MCNC: 5 G/DL (ref 6–8.4)
RBC # BLD AUTO: 3.2 M/UL (ref 4–5.4)
SODIUM SERPL-SCNC: 137 MMOL/L (ref 136–145)
SPECIMEN OUTDATE: NORMAL
TRANS ERYTHROCYTES VOL PATIENT: NORMAL ML
TRANS ERYTHROCYTES VOL PATIENT: NORMAL ML
WBC # BLD AUTO: 7.12 K/UL (ref 3.9–12.7)

## 2024-08-19 PROCEDURE — 80053 COMPREHEN METABOLIC PANEL: CPT | Mod: HCNC | Performed by: HOSPITALIST

## 2024-08-19 PROCEDURE — 84100 ASSAY OF PHOSPHORUS: CPT | Mod: HCNC | Performed by: HOSPITALIST

## 2024-08-19 PROCEDURE — 63600175 PHARM REV CODE 636 W HCPCS: Mod: HCNC | Performed by: HOSPITALIST

## 2024-08-19 PROCEDURE — 86850 RBC ANTIBODY SCREEN: CPT | Mod: HCNC | Performed by: HOSPITALIST

## 2024-08-19 PROCEDURE — 92610 EVALUATE SWALLOWING FUNCTION: CPT | Mod: HCNC

## 2024-08-19 PROCEDURE — 97535 SELF CARE MNGMENT TRAINING: CPT | Mod: HCNC

## 2024-08-19 PROCEDURE — 99233 SBSQ HOSP IP/OBS HIGH 50: CPT | Mod: HCNC,,,

## 2024-08-19 PROCEDURE — 21400001 HC TELEMETRY ROOM: Mod: HCNC

## 2024-08-19 PROCEDURE — 25000003 PHARM REV CODE 250: Mod: HCNC | Performed by: INTERNAL MEDICINE

## 2024-08-19 PROCEDURE — 83735 ASSAY OF MAGNESIUM: CPT | Mod: HCNC | Performed by: HOSPITALIST

## 2024-08-19 PROCEDURE — 86901 BLOOD TYPING SEROLOGIC RH(D): CPT | Mod: HCNC | Performed by: HOSPITALIST

## 2024-08-19 PROCEDURE — 51798 US URINE CAPACITY MEASURE: CPT | Mod: HCNC

## 2024-08-19 PROCEDURE — 97530 THERAPEUTIC ACTIVITIES: CPT | Mod: HCNC

## 2024-08-19 PROCEDURE — 86900 BLOOD TYPING SEROLOGIC ABO: CPT | Mod: HCNC | Performed by: HOSPITALIST

## 2024-08-19 PROCEDURE — 25000003 PHARM REV CODE 250: Mod: HCNC

## 2024-08-19 PROCEDURE — 85025 COMPLETE CBC W/AUTO DIFF WBC: CPT | Mod: HCNC | Performed by: HOSPITALIST

## 2024-08-19 PROCEDURE — 25000003 PHARM REV CODE 250: Mod: HCNC | Performed by: HOSPITALIST

## 2024-08-19 PROCEDURE — 36415 COLL VENOUS BLD VENIPUNCTURE: CPT | Mod: HCNC | Performed by: HOSPITALIST

## 2024-08-19 RX ORDER — INSULIN ASPART 100 [IU]/ML
11 INJECTION, SOLUTION INTRAVENOUS; SUBCUTANEOUS
Status: DISCONTINUED | OUTPATIENT
Start: 2024-08-19 | End: 2024-08-20

## 2024-08-19 RX ORDER — BISACODYL 10 MG/1
10 SUPPOSITORY RECTAL ONCE
Status: COMPLETED | OUTPATIENT
Start: 2024-08-19 | End: 2024-08-19

## 2024-08-19 RX ORDER — INSULIN ASPART 100 [IU]/ML
9 INJECTION, SOLUTION INTRAVENOUS; SUBCUTANEOUS
Status: DISCONTINUED | OUTPATIENT
Start: 2024-08-19 | End: 2024-08-19

## 2024-08-19 RX ADMIN — QUETIAPINE FUMARATE 50 MG: 25 TABLET ORAL at 08:08

## 2024-08-19 RX ADMIN — INSULIN ASPART 9 UNITS: 100 INJECTION, SOLUTION INTRAVENOUS; SUBCUTANEOUS at 12:08

## 2024-08-19 RX ADMIN — SODIUM BICARBONATE 650 MG TABLET 650 MG: at 08:08

## 2024-08-19 RX ADMIN — CEFTRIAXONE 1 G: 1 INJECTION, POWDER, FOR SOLUTION INTRAMUSCULAR; INTRAVENOUS at 02:08

## 2024-08-19 RX ADMIN — ACETAMINOPHEN 1000 MG: 325 TABLET ORAL at 06:08

## 2024-08-19 RX ADMIN — ACETAMINOPHEN 1000 MG: 325 TABLET ORAL at 02:08

## 2024-08-19 RX ADMIN — VANCOMYCIN HYDROCHLORIDE 500 MG: 500 INJECTION, POWDER, LYOPHILIZED, FOR SOLUTION INTRAVENOUS at 12:08

## 2024-08-19 RX ADMIN — INSULIN GLARGINE 28 UNITS: 100 INJECTION, SOLUTION SUBCUTANEOUS at 08:08

## 2024-08-19 RX ADMIN — METHOCARBAMOL 500 MG: 500 TABLET ORAL at 05:08

## 2024-08-19 RX ADMIN — ACETAMINOPHEN 1000 MG: 325 TABLET ORAL at 08:08

## 2024-08-19 RX ADMIN — DEXTROSE MONOHYDRATE 250 MG: 50 INJECTION, SOLUTION INTRAVENOUS at 02:08

## 2024-08-19 RX ADMIN — OXYCODONE HYDROCHLORIDE 10 MG: 10 TABLET ORAL at 12:08

## 2024-08-19 RX ADMIN — ALUMINUM HYDROXIDE, MAGNESIUM HYDROXIDE, SIMETHICONE 30 ML: 400; 400; 40 SUSPENSION ORAL at 05:08

## 2024-08-19 RX ADMIN — DEXTROSE MONOHYDRATE 250 MG: 50 INJECTION, SOLUTION INTRAVENOUS at 07:08

## 2024-08-19 RX ADMIN — INSULIN ASPART 9 UNITS: 100 INJECTION, SOLUTION INTRAVENOUS; SUBCUTANEOUS at 09:08

## 2024-08-19 RX ADMIN — APIXABAN 2.5 MG: 2.5 TABLET, FILM COATED ORAL at 08:08

## 2024-08-19 RX ADMIN — ISOSORBIDE MONONITRATE 60 MG: 30 TABLET, EXTENDED RELEASE ORAL at 08:08

## 2024-08-19 RX ADMIN — METHOCARBAMOL 500 MG: 500 TABLET ORAL at 12:08

## 2024-08-19 RX ADMIN — PANTOPRAZOLE SODIUM 40 MG: 40 TABLET, DELAYED RELEASE ORAL at 08:08

## 2024-08-19 RX ADMIN — METHOCARBAMOL 500 MG: 500 TABLET ORAL at 08:08

## 2024-08-19 RX ADMIN — METOPROLOL SUCCINATE 25 MG: 25 TABLET, EXTENDED RELEASE ORAL at 08:08

## 2024-08-19 RX ADMIN — Medication 6 MG: at 08:08

## 2024-08-19 RX ADMIN — ALUMINUM HYDROXIDE, MAGNESIUM HYDROXIDE, SIMETHICONE 30 ML: 400; 400; 40 SUSPENSION ORAL at 06:08

## 2024-08-19 RX ADMIN — FLUOXETINE HYDROCHLORIDE 40 MG: 20 CAPSULE ORAL at 08:08

## 2024-08-19 RX ADMIN — CALCIUM CARBONATE (ANTACID) CHEW TAB 500 MG 1000 MG: 500 CHEW TAB at 08:08

## 2024-08-19 RX ADMIN — DEXTROSE MONOHYDRATE 250 MG: 50 INJECTION, SOLUTION INTRAVENOUS at 11:08

## 2024-08-19 RX ADMIN — OXYCODONE HYDROCHLORIDE 10 MG: 10 TABLET ORAL at 07:08

## 2024-08-19 RX ADMIN — INSULIN ASPART 4 UNITS: 100 INJECTION, SOLUTION INTRAVENOUS; SUBCUTANEOUS at 12:08

## 2024-08-19 RX ADMIN — ALUMINUM HYDROXIDE, MAGNESIUM HYDROXIDE, SIMETHICONE 30 ML: 400; 400; 40 SUSPENSION ORAL at 12:08

## 2024-08-19 RX ADMIN — BISACODYL 10 MG: 10 SUPPOSITORY RECTAL at 08:08

## 2024-08-19 NOTE — MEDICAL/APP STUDENT
[facility] - Surgery  Progress Note    Patient Name: Lorena Contreras  MRN: [id number]  Patient Class: IP- Inpatient   Admission Date: 8/13/2024  Length of Stay: 5 days  Attending Physician: Rupal Ochoa MD  Primary Care Provider: Jorge Paris MD    Subjective:     CC: R Ankle, Pelvic Ring fractures     HPI:   Ms. Lorena Contreras is a very pleasant 73yoF with PMHx T2DM with chronic bilateral neuropathy, fibromyalgia, HTN, HLD, Chronic diastolic HF, MI, CVA, PVD, CAD, CKD, GERD, and remote history of follicular lymphoma (2009). She presents as a transfer from [facility] for orthopedic evaluation of R ankle and pelvis after fall from standing height in the grocery store in the evening 8/13.  Describes twisting her ankle, being unable to catch herself, and falling to her right. Denies head strike; denies LOC. Felt immediate pain in her right ankle and hip and was unable to ambulate. Pain was worst in her ankle, and aggravated by movement. Denies prior falls. She walks unassited at baseline. Lives with her grandson, but completes most ADLs independently.     ED Course:  Labs significant for Hgb 10.9, Hct 33.2, eGFR 27.5, Cr 1.9 (CKD4, baseline Cr ~1.2-1.5, 1.8 in July), Gluc 348, AST 50, ALT 45, , Troponin 0.013. UA positive for protein, glucose, blood, leukocytes (1+), WBCs, and scant bacteria. CXR demonstrating mild edema vs. Chronic changes (no significant change from prior imaging). R ankle XR with fractures of the distal tibia and fibula. R Hip XR with fractures of the inferior and superior pubic rami as well as suspicion for fracture of the illiac wing. Better demonstrated on CT pelvis, which showed fractures of the R iliac wing, anterior pillar of the R acetabulum, lateral superior ramus, and R inferior pubic ramus. Ortho evaluated in [facility] ED and plan for surgery 8/14.      Hospital Course :  R Ankle ORIF by Dr. Davalos 8/14. Plan for surgical fixation of the pelvis 8/16. Interim DVT  Prophylaxis with heparin. Mag repleted and increased to 2.2 from 1.5 on admit. Supplementing chronically low bicarb in setting of CKD4. Hgb decreased to 6.8 8/17; transfusion initiated. Hgb 8/18 increased to 10. UA equivocal for UTI; culture positive for E coli, E faecalis. Started on Rocephin, vancomycin 8/17 as patient is allergic to sulfa antibiotics. 8/17 patient was found to be delirious, significantly altered from baseline. Alert and oriented to person only. She attempted to pull her lines out necessitating restraints. Restraints were removed the next morning, 8/18, when patient was found to be more oriented, able to accurately converse about personal facts (recent surgeries, home, daughters, hobbies, etc) that she had described earlier in the admission. She was more altered later in the day after what sounded like a possible panic attack, and restraints had to be replaced mid-day as she started pulling out her lines in confusion. She responded somewhat to 0.5mg ativan for the panic attack. With delirium later, given Zyprexa 5mg (no results), then IV benadryl (eyelids heavy but fighting sleep). She became more altered and agitated around 18:30, and was given single dose of 10 Zyprexa. Her daughters were in the room and reported that at various times their mother had described seeing a strange man in the corner of the room, a baby, cats, and was heard to be conversing with the walls. Witnessed by physician and nursing staff. Delirium was felt to be the consequence of her known anxiety, very poor sleep in the hospital, and 2 UTIs currently undergoing treatment. Delirium precautions in place. Prioritizing chemical restraints first, physical only if chemical fail. Psych consulted to assist with evaluation of delirium and recommendations for sleep, delirium PRNs.   Ms. Contreras refused to eat for most of these two days, aside from some pudding, cream of wheat, and a protein drink her daughters brought from home.  "Reported stomach burning, and asked for Maalox/Tums which were given with relief. No BM since admit and patient "feeling full". Suppository attempted 8/18 but patient reportedly denied. Completed 8/19 and waiting BM. Benoit in due to restraints. Hypocalcemia, replacing 8/18.     Interval History:   Ms. Contreras was sleeping deeply this morning, which was much needed after her difficult weekend. Returned after daughter arrived and patient had awoken. She seems to be in much better spirits this morning, fairly calm, and cooperative. Restraints were removed. Once again able to confirm where she lives, her favorite holiday, and being in hospital. Psych recommending depacon TID, Seroquel, melatonin yesterady, which were started last night and aptient appears to have gotten much better sleep last night and is more herself. Per daughter, much closer to baseline. States she is not hungry this morning, but daughter was at bedside and agreed to continue offering her options throughout the day. Glucose in low 100s, so will hold novolog provided she continues to be <160 and eats less than half her breakfast. Was reporting some residual throat scratchiness causing slow swallow, but speech pathology performed swallow study today and patient has no restrictions on food/swallow. Reporting pain at the abdominal incison, ad patient has a lot of trepidation about moving, lying flat, etc as it "stretches" her stomach. May be more anxiety about pain than true pain. Suppository completed today, awaiting BM. Benoit to be removed and exchanged for purewick which the patient is enthusiastic about. Hgb stable at 9. Cr 1.7, down from 1.9. Ongoing tremor; continue to hold gabapentin in case it is a contributor and the patient feels she has a bad reaction (mood) to gabapentin anyway. Tremor is felt to be more likely anxiety related.   Day 3 of antibiotics for UTI; will plan on extended course given confusion, up to 5 days.     Restraints may " complicate dispo, as patient will have to have been out of them 24hr prior to SNF placement. Will be monitoring delirium and waiting for completion of IV antibiotic course anyway, so MIKHAIL updated to Wednesday 8/21.     Review of Systems   Constitutional:  Negative for chills and fever. Weight loss: significant planned weight loss over last few years.  HENT: Negative.     Eyes: Negative.    Respiratory:  Negative for cough, sputum production and shortness of breath.    Cardiovascular:  Negative for chest pain and palpitations.   Gastrointestinal:  Negative for diarrhea, nausea and vomiting.   Genitourinary:  Negative for dysuria.   Musculoskeletal:  Positive for back pain and joint pain.   Skin:  Negative for itching.   Neurological:  Positive for tremors (BUE), sensory change (chronic BLE peripheral neuropathy) and weakness. Negative for dizziness, tingling, speech change, seizures, loss of consciousness and headaches.   Psychiatric/Behavioral:  Hallucinations: none reported this AM. The patient is nervous/anxious. Insomnia: improved. good sleep last night.     Objective:     Vitals:    08/19/24 1539   BP: (!) 120/59   Pulse: 89   Resp: 18   Temp: 96.4 °F (35.8 °C)     Physical Exam  Constitutional:       General: She is not in acute distress.     Appearance: Normal appearance. She is not toxic-appearing.   HENT:      Mouth/Throat:      Comments: Tongue bright pink. Patient has not had much to eat or drink over the last 2-3 days.  Cardiovascular:      Rate and Rhythm: Normal rate and regular rhythm.      Pulses: Normal pulses.      Heart sounds: Normal heart sounds.   Pulmonary:      Effort: Pulmonary effort is normal. No respiratory distress.      Breath sounds: Normal breath sounds.   Abdominal:      General: Bowel sounds are normal. There is no distension.      Tenderness: There is no abdominal tenderness.   Musculoskeletal:         General: Tenderness and signs of injury (s/p R ankle ORIF. Pelvic reing fx)  present.   Skin:     Capillary Refill: Capillary refill takes less than 2 seconds.      Coloration: Skin is jaundiced.   Neurological:      Mental Status: She is alert.      Gait: Gait abnormal (NWB RLE).      Comments: BUE tremor, worst at rest. No past-pointing, intention tremor, or significant ataxia on FTN testing.    Psychiatric:      Comments: Oriented to person, place (hospital), situation (ankle surgery, pelvic surgery). Closer to baseline today than over the weekend.       Recent Labs   Lab 08/19/24  0530   CALCIUM 7.9*   ALBUMIN 1.5*   PROT 5.0*      K 4.1   CO2 21*      BUN 40*   CREATININE 1.7*   ALKPHOS 132   ALT 9*   AST 42*   BILITOT 0.3     eGFR 31.5  Glucose 129  POCT 118 AM, 211 PM  Mg 1.8    Recent Labs   Lab 08/19/24  0530   WBC 7.12   RBC 3.20*   HGB 9.0*   HCT 28.6*      MCV 89   MCH 28.1   MCHC 31.5*     Assessment/Plan:      Ms. Lorena Contreras is a very pleasant 73yoF who presents after mechanical fall from standing height, with resulting R ankle trimalleolar fracture and R pelvic ring fracture. S/p R ankle ORIF 8/14, with pelvic fixation planned for 8/16.      **R Ankle Pilon Fracture  [CT: There is acute appearing fracture involving the distal aspect of the fibula. There is comminuted fracture of the medial malleolus.]  S/p ORIF with post-op splint. Plan to change to short-leg fiberglass cast prior to d/c with consideration for CAM walker at 3wk post-op.   - Restart diet post op (Diabetic + Renal diet). No restrictions on swallow per speech therapy.   - NWB RLE x 10wk  - Pain management with multimodals and narcotics PRN for breakthrough pain  - Benoit in place, to be removed today and pure wick placed instead  - PT/OT referral for evaluation post-op, Recommendations appreciated.               > Ortho restrictions: NWB RLE  - Patient is not anticoagulated on baseline; She is on antiplatelets.  - DVT Prophylaxis:   > Eliquis x 10 weeks post op     ** R Pelvic Ring  Fracture  [CT: Acute traumatic fractures involving the right iliac wing, the anterior pillar of the right acetabulum and the lateral most aspect of the right superior ramus, and the right inferior pubic ramus.]  S/p surgical fixation 8/16.   - f/u ortho recs  - pain management with multimodals and narcotics PRN for breakthrough pain   - eliquis x 10 weeks post op for DVT prophylaxis (patient will be BWB RLE for 10 weeks per ankle protocols).     Delirium  Present immediately post op in PACU and has been on/off with severe anxiety. Over the weekend, patient was in full delirium, hallucinating and talking to the wall and seeing people/babies. Minimally responsive to zyprexa x2, IV benadryl x1, and small dose ativan on 8/17 to try to calm and help sleep. Acute delirium likely 2/2 her known anxiety, no sleep in >48hrs, and 2 UTIs currently undergoing treatment. Delirium precautions in place. Prioritizing chemical restraints first, physical only if chemical fail. Psych consulted to assist with evaluation of delirium and recommendations for sleep, delirium PRNs. Delirium now improved 8/19 after institution of recommendations and a good night's sleep. Will continue.   - f/u Psych recommendations, with thanks   > Depakote 250mg IV TID   > Seroquel   > Melatonin    Generalized Anxiety Disorder.   Discussed multiple ideas of decreased anxiety and stress relief. Will try to coordinate with daughters to find a way of bringing some crafts in from home for her to work on, as this help distract her and keep her calm. Emphasized importance of taking things one day at a time and prioritizing safety.   - Ativan 0.5mg PO PRN (home dose)  - continue home fluoxetine    UTI  UA equivocal for UTI vs fallout from CKD, and antibiotics were held until cultures resulted. Culture positive for E coli, E faecalis. Antibiotic treatment initiated 8/17 with positive cultures.   - continue Vancomycin, rocephin (patient allergic to sulfa  "antibiotics)    Constipation  No BM since admit. Low PO intake, and two surgeries in the meantime with pain control. Patient feeling "full", and reporting abdominal pain and would likely to avoid additional PO medications. Agreeable to suppository today, awaiting BM. (Was prescribed 8/18, but not given for unclear reasons.)      Hypoalbuminemia  Albumin 1.5 today, down from 1.9. Patient has not eaten much in the last 48-72 hours.   - Start boost (glucose control)    Hypocalcemia  Ca 7.9 today. Likely due to poor intake and hypoalbuminemia as patient has not eaten much in the last 48-72 hours. Replaced starting 8/17. Will continue to replete and monitor closely.   - daily CMP  - replace    Hypomagnesemia - resolved  Mg 1.5 on admit. Repleted overnight and increased to 2.2. 1.8 today. Will continue to monitor.      Anemia  Hgb 9.0, stable. Hct 28.6. Transfused 2 units after Hgb was 6.8 on 8/17. Anemia likely associated with recent trauma and subsequent surgery which was quite significant with expected blood loss. Currently asymptomatic. Taking vitamin supplementation at home including iron, folic acid and B12. MCV WNL (85). Will monitor closely with daily CBC.   - daily CBC  - Continue home vitamin supplementation  - Repeat transfusion if H/H drops below 7/21, or patient becomes hemodynamically unstable, or symptomatic.      T2DM  Most Recent A1C 8.2 (July '24). Home regimen 12 units lantus daily and 20 units novolog tid with meals. Patient has not been eating regular meals lately due to delirium. Glucose 147 per Dexcom this AM, 129 on CMP. Will need to continue titrating glucose as patient's eating/sugars fluctuate.   - hold home antihyperglycemics while in hospital  - 10 novolog tid with meals UNLESS patient glucose is <160 or eats less than half her meals.   - lantus 14 units daily  - hold ozempic while inpatient due to risk of gastric retention with surgery; patient is not eating much anyway and we would not want " to further squelch hunger.      Peripheral Neuropathy  Chronic. 2/2 T2DM. No current complaints. Patient states she is not taking gabapentin for this at home; will be holding gabapentin in case it is associated with tremors.        CKD-4  eGFR 31.5, (July baseline 27.5). Cr back down to 1.7, (July baseline of 1.9). Labs on admit were consistant with July baeline. Likely acute mild KYA on chronic CKD4, 2/2 NPO status and surgery.  - gentle IV fluids  - encourage PO rehydration.  - monitor I&Os  - avoid nephrotoxic medications where possible  - daily CMP     Bicarb 21, up from 17 on admit. Chronically low per chart review. Likely 2/2 decreased kidney function. Not initially on replacement. Data shows that low bicarb can worsen kidney function and hasten progression of CKD. Started on supplementation inpatient, to be continued long term.   - Daily CMP  - sodium bicarbonate 650mg PO BID, to be continued long term. May adjust dose as needed.      HTN  Most recent blood pressures reviewed. She has been hypertensive since admission.   - continue home hydralazine  - continue home metoprolol 25mg  - continue imdur (increased 8/18)    Transaminitis  Mildly elevated AST/ALT: 42/9 down from 50/9 yesterday.  - hold statin.      HLD  - HOLD home atorvastatin in setting of transaminitis    Heavenly Yeboah, MS3  [facility] - Surgery

## 2024-08-19 NOTE — ASSESSMENT & PLAN NOTE
Abd pain so avoiding extra oral regimen 8/18 and ageeeable to suppository-- wasn't given for ucnlear reasons 8/18 so given thsi Am with nursing. If not resolved will do enema

## 2024-08-19 NOTE — PT/OT/SLP PROGRESS
Physical Therapy Treatment    Patient Name:  Lorena Contreras   MRN:  [id number]    Recommendations:     Discharge Recommendations: High Intensity Therapy  Discharge Equipment Recommendations: wheelchair  Barriers to discharge:  requires increased level of physical assistance     Assessment:     Lorena Contreras is a 73 y.o. female admitted with a medical diagnosis of Closed right pilon fracture.  She presents with the following impairments/functional limitations: weakness, impaired functional mobility, gait instability, impaired balance, impaired cognition, decreased lower extremity function, pain, decreased safety awareness, orthopedic precautions, impaired self care skills. Patient perseverating on having to have a bowel movement throughout PT session. She was unable to perform a stand today. She would continue to benefit from PT in order to increase standing tolerance due to R LE NWB.    Rehab Prognosis: Good; patient would benefit from acute skilled PT services to address these deficits and reach maximum level of function.    Recent Surgery: Procedure(s) (LRB):  ORIF, FRACTURE, ACETABULUM (Right) 3 Days Post-Op    Plan:     During this hospitalization, patient to be seen 4 x/week to address the identified rehab impairments via therapeutic activities, therapeutic exercises, neuromuscular re-education, wheelchair management/training and progress toward the following goals:    Plan of Care Expires:  09/14/24    Subjective     Chief Complaint: Needs to have a bowel movement.   Patient/Family Comments/goals: To get back to PLOF.   Pain/Comfort:  Pain Rating 1:  (did not rate)  Location - Side 1: Right  Location 1: leg  Pain Addressed 1: Reposition, Distraction      Objective:     Communicated with RN and MD prior to session.  Patient found right sidelying on bed pan with chong catheter, telemetry, SCD upon PT entry to room.     General Precautions: Standard, fall  Orthopedic Precautions: RLE non weight  bearing  Braces:  (plaster splint to R lower leg)  Respiratory Status: Room air     Functional Mobility:  Bed Mobility:     Rolling Left:  maximal assistance  Rolling Right: minimum assistance  Scooting: total assistance to scoot out to edge of bed while seated in upright position   Supine to Sit: maximal assistance  Sit to Supine: total assistance  Transfers:     Sit to Stand:  total assistance of 2 persons with rolling walker. Unable to clear bed hip from bed on standing trial. Unable to attempt more trials due to patient needing to lay back supine in bed to have a bowel movement.   Balance: Sitting: Patient sat on edge of bed ~7 minutes with contact guard assistance.     AM-PAC 6 CLICK MOBILITY  Turning over in bed (including adjusting bedclothes, sheets and blankets)?: 2  Sitting down on and standing up from a chair with arms (e.g., wheelchair, bedside commode, etc.): 1  Moving from lying on back to sitting on the side of the bed?: 2  Moving to and from a bed to a chair (including a wheelchair)?: 1  Need to walk in hospital room?: 1  Climbing 3-5 steps with a railing?: 1  Basic Mobility Total Score: 8       Treatment & Education:  Patient educated in:  -PT role and POC  -safety with transfers including hand placement due to R LE NWB     Patient left supine with all lines intact, call button in reach, RN notified, and daughter present.    GOALS:   Multidisciplinary Problems       Physical Therapy Goals          Problem: Physical Therapy    Goal Priority Disciplines Outcome Goal Variances Interventions   Physical Therapy Goal     PT, PT/OT Progressing     Description: Goals to be met by: 24     Patient will increase functional independence with mobility by performin. Supine to sit with Stand-by Assistance  2. Sit to stand transfer with Minimal Assistance  3. Bed to chair transfer with Minimal Assistance using Rolling Walker  4. Gait  x 10 feet with Minimal Assistance using Rolling Walker.   5.  Wheelchair propulsion x100 feet with Supervision using bilateral upper extremities  6. Stand for 5 minutes with Contact Guard Assistance using Rolling Walker    Patient has a mobility limitation that significantly impairs their ability to participate in one or more mobility related activities of daily living in customary locations in the home. The mobility limitation cannot be sufficiently resolved by the use of a cane or walker. The use of a manual wheelchair will greatly improve the patient's ability to participate in MRADLs. The patient will use the wheelchair on a regular basis at home. They have expressed their willingness to use a manual wheelchair in the home, and have a caregiver who is available and willing to assist with the wheelchair if needed.                         Time Tracking:     PT Received On: 08/19/24  PT Start Time: 0955     PT Stop Time: 1020  PT Total Time (min): 25 min     Billable Minutes: Therapeutic Activity 25    Co-treatment performed for this visit due to patient need for two skilled therapists to ensure patient and staff safety and to accommodate for patient activity tolerance/pain management.    Treatment Type: Treatment  PT/PTA: PT     Number of PTA visits since last PT visit: 0     08/19/2024

## 2024-08-19 NOTE — ASSESSMENT & PLAN NOTE
Has chronic issues, takes maalox at home, reports having here burning with eating and requests and added scheduled now with tums prn  Constipation also making her feel full, suppository added 8/18 not given until 8/19.

## 2024-08-19 NOTE — ASSESSMENT & PLAN NOTE
Hard to titrate at baseline and had to have doctors adjust a lot, runs very high at times up to 1100 she said once even.   Titrate 8/15, running higher as didn't get long acting yesterday and adjusted today and will adjsut further. Has some allergies to some insulins. On tresiba at home.  - low dose SSI  - diabetic diet  - POCT checks  Baseline runs very high per dr schmidt notes she sees with endocrine outpatient. Baseline average 253 glucose on dexcom he reports. Dexcom removed for OR. On drip in OR wasn't given llantus this AM though. Will incease dose to giev now in pacu and inc to moderate sliding scale and inc novolog and will likely titrate here as not contolled at home.  Glucose in 100s on 8/17 after 28 U lantus last night and moderate slidign scale. Isnt eating much on ful liquids now, so hold novolog unless glucose is >160 with meals and eating at least half of meal if is in 100s but she tends to run higher even when NPo yesterday was 300s in pacu so titrate further based on trending today and will watch closely. Has had no hypoglycemia on dexcom per above.  Glucose better but not eating, just starting to eat 8/18 so will likely need to titrate more  Improved 8/19, glucose lwoer 100s, holding novolgo with breakfast as didn't eat too much, nursing comm to hold novolog if eats less than half meal and glucose under 160 and titrate down dose also while is not eating as much    Initial (On Arrival)

## 2024-08-19 NOTE — PLAN OF CARE
Problem: Adult Inpatient Plan of Care  Goal: Plan of Care Review  Outcome: Progressing  Goal: Patient-Specific Goal (Individualized)  Outcome: Progressing  Goal: Absence of Hospital-Acquired Illness or Injury  Outcome: Progressing  Goal: Optimal Comfort and Wellbeing  Outcome: Progressing  Goal: Readiness for Transition of Care  Outcome: Progressing     Problem: Infection  Goal: Absence of Infection Signs and Symptoms  Outcome: Progressing     Problem: Diabetes Comorbidity  Goal: Blood Glucose Level Within Targeted Range  Outcome: Progressing     Problem: Skin Injury Risk Increased  Goal: Skin Health and Integrity  Outcome: Progressing     Problem: Wound  Goal: Optimal Coping  Outcome: Progressing  Goal: Optimal Functional Ability  Outcome: Progressing  Goal: Absence of Infection Signs and Symptoms  Outcome: Progressing  Goal: Improved Oral Intake  Outcome: Progressing  Goal: Optimal Pain Control and Function  Outcome: Progressing  Goal: Skin Health and Integrity  Outcome: Progressing  Goal: Optimal Wound Healing  Outcome: Progressing     Problem: Acute Kidney Injury/Impairment  Goal: Fluid and Electrolyte Balance  Outcome: Progressing  Goal: Improved Oral Intake  Outcome: Progressing  Goal: Effective Renal Function  Outcome: Progressing     Problem: Fall Injury Risk  Goal: Absence of Fall and Fall-Related Injury  Outcome: Progressing

## 2024-08-19 NOTE — ASSESSMENT & PLAN NOTE
Present immediately post op in pacu and has been on/off with severe anxiety but now is full delirium with hallucinating and talking to the wall and seeing people/babies. Has UTIS on treatment but hasn't slept in over 48 hours and PRNS not improving with probably worsening delirium the last 24 hours, minimal responsive to zyprexa x2, iv benadryl once, and small dose ativan on 7/17 to try to calm and help sleep.  Psych consulted 8/18 to help with PRNS and sleep to try to get her on meds to promote her to sleep and for agitation until can get back to baseline while treating medical contributors as well  Psych rec depakoate IV TID + melatonin/seroquel scheduled qhs. Apprecaite recs, as patient now with finally improved delirium 8/19 after sleeping.

## 2024-08-19 NOTE — PROGRESS NOTES
[facility]  Orthopedics  Progress Note    Patient Name: Lorena Contreras  MRN: [id number]  Admission Date: 8/13/2024  Hospital Length of Stay: 5 days  Attending Provider: Rupal Ochoa MD  Primary Care Provider: Jorge Paris MD  Follow-up For: Procedure(s) (LRB):  ORIF, FRACTURE, ACETABULUM (Right)    Post-Operative Day: 3 Days Post-Op  Subjective:     Principal Problem:Closed right pilon fracture    Principal Orthopedic Problem: same + R both column acetabular fracture    Interval History: Pt seen and examined at bedside. Patient remains confused this morning. She is A&Ox2 and oriented to person only. Remains in restraints. Hypertensive overnight. Denies any numbness or tingling. Patient has not been sleeping well.       Review of patient's allergies indicates:   Allergen Reactions    Novolin 70/30 (semi-synthetic) Nausea And Vomiting     Severe vomiting on Relion 70/30    Sulfa (sulfonamide antibiotics) Anaphylaxis    Talwin [pentazocine lactate] Anaphylaxis    Victoza [liraglutide] Nausea And Vomiting    Glipizide Nausea Only    Citric acid     Codeine     Influenza virus vaccines Hives    Iodine and iodide containing products Hives    Levetiracetam Itching    Rituxan [rituximab] Hives    Zoloft [sertraline] Nausea And Vomiting       Current Facility-Administered Medications   Medication    0.9%  NaCl infusion (for blood administration)    0.9%  NaCl infusion (for blood administration)    acetaminophen tablet 1,000 mg    albuterol-ipratropium 2.5 mg-0.5 mg/3 mL nebulizer solution 3 mL    aluminum & magnesium hydroxide-simethicone 400-400-40 mg/5 mL suspension 30 mL    apixaban tablet 2.5 mg    bisacodyL suppository 10 mg    bisacodyL suppository 10 mg    calcium carbonate 200 mg calcium (500 mg) chewable tablet 1,000 mg    calcium carbonate 200 mg calcium (500 mg) chewable tablet 500 mg    cefTRIAXone (Rocephin) 1 g in D5W 100 mL IVPB (MB+)    dextrose 10% bolus 125 mL 125 mL    dextrose 10%  "bolus 125 mL 125 mL    dextrose 10% bolus 125 mL 125 mL    dextrose 10% bolus 250 mL 250 mL    dextrose 10% bolus 250 mL 250 mL    dextrose 10% bolus 250 mL 250 mL    FLUoxetine capsule 40 mg    glucagon (human recombinant) injection 1 mg    glucagon (human recombinant) injection 1 mg    glucose chewable tablet 16 g    glucose chewable tablet 16 g    glucose chewable tablet 24 g    glucose chewable tablet 24 g    hydrOXYzine HCL tablet 25 mg    insulin aspart U-100 pen 0-10 Units    insulin aspart U-100 pen 13 Units    insulin glargine U-100 (Lantus) pen 28 Units    isosorbide mononitrate 24 hr tablet 60 mg    melatonin tablet 6 mg    methocarbamoL tablet 500 mg    metoprolol succinate (TOPROL-XL) 24 hr tablet 25 mg    morphine injection 2 mg    ondansetron disintegrating tablet 8 mg    oxyCODONE immediate release tablet 5 mg    oxyCODONE immediate release tablet Tab 10 mg    pantoprazole EC tablet 40 mg    polyethylene glycol packet 17 g    promethazine tablet 25 mg    QUEtiapine tablet 50 mg    sodium bicarbonate tablet 650 mg    sodium chloride 0.9% flush 10 mL    valproate (DEPACON) 250 mg in D5W 50 mL IVPB    vancomycin - pharmacy to dose     Objective:     Vital Signs (Most Recent):  Temp: 98.4 °F (36.9 °C) (08/19/24 0458)  Pulse: 95 (08/19/24 0458)  Resp: 18 (08/19/24 0458)  BP: (!) 173/73 (08/19/24 0458)  SpO2: 95 % (08/19/24 0458) Vital Signs (24h Range):  Temp:  [98.3 °F (36.8 °C)-99.4 °F (37.4 °C)] 98.4 °F (36.9 °C)  Pulse:  [] 95  Resp:  [16-19] 18  SpO2:  [94 %-98 %] 95 %  BP: (108-173)/(56-73) 173/73     Weight: 81.2 kg (179 lb 0.2 oz)  Height: 5' 9" (175.3 cm)  Body mass index is 26.44 kg/m².      Intake/Output Summary (Last 24 hours) at 8/19/2024 0634  Last data filed at 8/19/2024 0522  Gross per 24 hour   Intake --   Output 3550 ml   Net -3550 ml          Ortho/SPM Exam     Gen: NAD, WDWN  CV: peripherally well perfused  Resp: unlabored respirations, symmetric chest " rise    MSK:  RLE:  Surgical dressings over right hip and abdomen c/d/I  Posterior slab splint in place  Fires Quad, wiggles toes  SILT  Compartments soft  Brisk cap refill  Foot is WWP     Significant Labs: CBC:   Recent Labs   Lab 08/18/24  0548 08/19/24  0530   WBC 10.48 7.12   HGB 10.8* 9.0*   HCT 32.4* 28.6*    166     CMP:   Recent Labs   Lab 08/18/24  0548   *   K 4.9      CO2 20*   *   BUN 42*   CREATININE 1.9*   CALCIUM 8.5*   PROT 6.1   ALBUMIN 1.9*   BILITOT 0.5   ALKPHOS 159*   AST 50*   ALT 9*   ANIONGAP 10     All pertinent labs within the past 24 hours have been reviewed.    Significant Imaging: I have reviewed all pertinent imaging results/findings.  Assessment/Plan:     * Closed right pilon fracture  Lorena Contreras is a 73 y.o. female with R pilon fracture and R both column acetabular fracture, closed, NVI,    S/p ORIF R pilon 8/14/24    NWB RLE  Eliquis 2.5 BID x 10 weeks  Remove splint, change dressing, and place a well-padded short-leg fiberglass cast prior to hospital discharge.          Closed displaced fracture of right acetabulum  S/p ORIF R acetabulum 8/16    - NWB RLE x 10 weeks  - Eliquis 2.5 BID x 10 weeks  - leave dressings dry and intact  - Psych consulted for delirium recs; pending recommendations, appreciate assistance   - Rocephin for UTI             CARLOS Finney MD  Orthopedics  [facility] - Surgery

## 2024-08-19 NOTE — ASSESSMENT & PLAN NOTE
Creatine stable for now. BMP reviewed- noted Estimated Creatinine Clearance: 33.6 mL/min (A) (based on SCr of 1.7 mg/dL (H)). according to latest data. Based on current GFR, CKD stage is stage 4 - GFR 15-29.  Monitor UOP and serial BMP and adjust therapy as needed. Renally dose meds. Avoid nephrotoxic medications and procedures.  - Cr 1.9 (near most recent baseline which is 1.6 to 1.8) on admit but 2.2 now so will do light IVF on 8/15 but slightly worse at 2.4 now with bobbi and closer to baseline 8/17 at 1.7-->1.9--1.7 now  - daily BMP  - avoid nephrotoxic medications

## 2024-08-19 NOTE — PLAN OF CARE
Problem: Adult Inpatient Plan of Care  Goal: Plan of Care Review  Outcome: Progressing  Goal: Patient-Specific Goal (Individualized)  Outcome: Progressing  Goal: Absence of Hospital-Acquired Illness or Injury  Outcome: Progressing  Goal: Optimal Comfort and Wellbeing  Outcome: Progressing  Goal: Readiness for Transition of Care  Outcome: Progressing     Problem: Infection  Goal: Absence of Infection Signs and Symptoms  Outcome: Progressing     Problem: Diabetes Comorbidity  Goal: Blood Glucose Level Within Targeted Range  Outcome: Progressing     Problem: Skin Injury Risk Increased  Goal: Skin Health and Integrity  Outcome: Progressing     Problem: Wound  Goal: Optimal Coping  Outcome: Progressing  Goal: Optimal Functional Ability  Outcome: Progressing  Goal: Absence of Infection Signs and Symptoms  Outcome: Progressing  Goal: Improved Oral Intake  Outcome: Progressing  Goal: Optimal Pain Control and Function  Outcome: Progressing  Goal: Skin Health and Integrity  Outcome: Progressing  Goal: Optimal Wound Healing  Outcome: Progressing     Problem: Acute Kidney Injury/Impairment  Goal: Fluid and Electrolyte Balance  Outcome: Progressing  Goal: Improved Oral Intake  Outcome: Progressing  Goal: Effective Renal Function  Outcome: Progressing     Problem: Fall Injury Risk  Goal: Absence of Fall and Fall-Related Injury  Outcome: Progressing     Problem: Restraint, Nonviolent  Goal: Absence of Harm or Injury  Outcome: Progressing    Pt ate about 25% meals today , family at bedside during the day , IV sites wnl, pt tolerated meds crushed , chong draining yellow urine, repositioned during shift , vss no distress, DSG to RLE and abd C/D/I .      Non-Graft Cartilage Fenestration Text: The exposed cartilage was fenestrated with a 3mm punch biopsy to help facilitate wound healing, and decrease infection and chondritis.

## 2024-08-19 NOTE — ASSESSMENT & PLAN NOTE
S/p ORIF R acetabulum 8/16    - NWB RLE x 10 weeks  - Eliquis 2.5 BID x 10 weeks  - leave dressings dry and intact  - Psych consulted for delirium recs; pending recommendations, appreciate assistance   - Rocephin for UTI

## 2024-08-19 NOTE — PLAN OF CARE
Problem: SLP  Goal: SLP Goal  Description: Speech Language Pathology Goals  Goals expected to be met by 9/2 1. Pt will tolerate regular and thin liquid diet with no concerns of aspiration     Outcome: Progressing     Recommend regular/thin liquid diet

## 2024-08-19 NOTE — PLAN OF CARE
08/19/24 1415   Post-Acute Status   Post-Acute Authorization Placement   Post-Acute Placement Status Pending payor review/awaiting authorization (if required)   Discharge Plan   Discharge Plan A Skilled Nursing Facility     PARAS informed patient selected [facility]. PARAS spoke to Caryl lagos/ [facility] who confirmed acceptance, plans to submit authorization for admit. CM team to follow.    PARAS completed LOCET and faxed PASRR to state. Waiting on 142.    Faith Nunes LMSW  Case Management   [facility]   Ext. [phone]

## 2024-08-19 NOTE — NURSING
Patient is alert . VSS. Patient  is off the restraint . Call light within reach , safety precaution maintained . Will continue monitoring.

## 2024-08-19 NOTE — PROGRESS NOTES
[facility] Medicine  Progress Note    Patient Name: Lorena Contreras  MRN: [id number]  Patient Class: IP- Inpatient   Admission Date: 8/13/2024  Length of Stay: 5 days  Attending Physician: Rupal Ochoa MD  Primary Care Provider: Jorge Paris MD        Subjective:     Principal Problem:Closed right pilon fracture        HPI:  Lorena Contreras is 74 y/o with PMHx of DM2, Fibromyalgia, HTN, HLD, CVA, MI, and CAD presents to ED via EMS as a [place] transfer for a fall. Pt was walking into grocery store when she twisted her ankle and fell despite attempts to catch herself. Denies LOC or head trauma. Patient fell on her right side. Had immediate pain to her ankle and hip. Pt was unable to ambulate due to pain. Pain is worse in her ankle. States it has subsided s/p pain medications. Pain has been exacerbated when moving. Had an episode of vomiting while splinting ankle but otherwise no further episodes. Denies fever, chills, chest pain, SOB, abdominal pain and urinary symptoms. Has numbness/ tingling to bilateral feet which she states is chronic 2/2 neuropathy.    In the ED: Afebrile, VSS. H&H 10.9 & 33.2. Cr 1.9. Glucose 348. LFTs AST 50, ALT 45. . Urine and drug toxicology screening pending. Chest Xray impression was interstitial findings may reflect edema versus chronic change, no large focal consolidation. Right Ankle Xray impression was distal tibial and fibular fractures as described. Right Hip Xray impression was fractures of the right inferior and superior pubic rami. There is cortical irregularity involving the right iliac wing, concerning for additional fracture. Right Knee Xray showed  no acute displaced fracture or dislocation of the knee. CT Pelvis showed acute traumatic fractures involving the right iliac wing, the anterior pillar of the right acetabulum and the lateral most aspect of the right superior ramus, and the right inferior pubic ramus. No hip  dislocation. CT Lumbar impression was There is no lumbar vertebral body fracture. There is grade 1 anterolisthesis of L4 on L5. At L3/L4, there is moderate central canal narrowing secondary to the facet arthrosis and ligamentum flavum hypertrophy without significant foraminal stenosis. At L4/L5, there is no stone significant central canal narrowing, but there is bilateral mild foraminal narrowing. Secondary to ligamentum flavum hypertrophy and facet arthrosis. At L5/S1, there is a broad-based disc bulge without any significant central canal stenosis or foraminal stenosis. Pain medications given in the ED. Ortho consulted. Admitted to .        Overview/Hospital Course:  No notes on file    Interval history- doing much better this AM mental status wise with daughter at bedside as well as nursing. Restraints removed by nursing during exam as agitation/confusion improved after depakoate IV TID added yesterday afternoon per psych recs and seroquel/melatonin scheduled qhs per recs and appreciaet assistance as slept most if not all of night per nurse report from overnight nursing and they didn't have any issues reported overnihgt on check out this AM per nursing. Suppository not given yesterday for unclear reasons and nursing gave this AM as no Bm since admit, still some abd fullness but starting to eat more per daughter and was askign for chicken last night. Still reports some throat scratchiness causing swallowing to be slower and family concerned could cause issues and are nervous to updgrade without eval so speech eval placed to see how she does before considering upgrading from liquids she's been on post op which was iniitalyl for nausea but now with this to check before considering. Kaycee to remove toady now out of restraints and to do purewick, she likes purewick and has wanted to get one at home even. BP improving. Glucose in lower 100s so hold novolog with breakfast and caveat to hold mealtime novolog if eats  less than half of meal and glucose under 160 and do sliding scale if so, as suspect once eating more will spike up as was hard to control when eating pre op. Hg stable at 9 and Cr 1.7. she is alert and oriented to stiuation, family, place and more like herself and daughter reports close to baseline now. She still has occasional tremor that was present on admit but was worse on admit, has had neuro work up as outpatient before and have been holding gabapentin as possible contributor as well.  Will need to be out of restraints 24 hours at least for SNF placement. Would plan to do a few extra days iv antibiotics given her confusion from lack of sleep + UTI and do 5 days for UTi with end date Wednesday and watch another day given prolonged delirium so would plan for ready for discharge potentialyl Wednesday and CM updated.              Review of patient's allergies indicates:   Allergen Reactions    Novolin 70/30 (semi-synthetic) Nausea And Vomiting     Severe vomiting on Relion 70/30    Sulfa (sulfonamide antibiotics) Anaphylaxis    Talwin [pentazocine lactate] Anaphylaxis    Victoza [liraglutide] Nausea And Vomiting    Glipizide Nausea Only    Citric acid     Codeine     Influenza virus vaccines Hives    Iodine and iodide containing products Hives    Levetiracetam Itching    Rituxan [rituximab] Hives    Zoloft [sertraline] Nausea And Vomiting       No current facility-administered medications on file prior to encounter.     Current Outpatient Medications on File Prior to Encounter   Medication Sig    Bacillus coagulans (DIGESTIVE ADVANTAGE IMMUNE ORAL) Take by mouth.    diclofenac sodium (VOLTAREN TOP) Apply topically.    gabapentin (NEURONTIN) 300 MG capsule Take 1 capsule (300 mg total) by mouth 3 (three) times daily.    hydrALAZINE (APRESOLINE) 50 MG tablet Take 1 tablet (50 mg total) by mouth every 8 (eight) hours.    LANKASH SOLOSTAR U-100 INSULIN 100 unit/mL (3 mL) InPn pen Inject 12 Units into the skin every  evening.    NOVOLOG FLEXPEN U-100 INSULIN 100 unit/mL (3 mL) InPn pen Inject 20 Units into the skin 3 (three) times daily with meals.    albuterol (PROVENTIL/VENTOLIN HFA) 90 mcg/actuation inhaler Inhale 2 puffs into the lungs every 6 (six) hours as needed for Wheezing or Shortness of Breath.    ASCORBIC ACID, VITAMIN C, ORAL Take by mouth once daily.    aspirin 81 MG Chew Take 1 tablet (81 mg total) by mouth once daily.    atorvastatin (LIPITOR) 80 MG tablet Take 1 tablet by mouth in the evening    cholecalciferol, vitamin D3, (VITAMIN D3) 50 mcg (2,000 unit) Cap Take 2 capsules by mouth 2 (two) times daily.    cyanocobalamin (VITAMIN B-12) 1000 MCG tablet Take 100 mcg by mouth once daily.    DEXCOM G7  Misc Use 1  to track blood glucose, ICD10: E11.65    DEXCOM G7 SENSOR Samina Use 1 sensor every 10 days to track blood glucose, ICD10: E11.65, okay with 90 day supply if possible    EPINEPHrine (EPIPEN) 0.3 mg/0.3 mL AtIn Inject 0.3 mLs (0.3 mg total) into the muscle once. for 1 dose    ESOMEPRAZOLE MAGNESIUM ORAL Take by mouth as needed. (Patient not taking: Reported on 5/28/2024)    FLUoxetine 40 MG capsule Take 1 capsule (40 mg total) by mouth once daily.    isosorbide mononitrate (IMDUR) 30 MG 24 hr tablet Take 1 tablet (30 mg total) by mouth once daily.    LIDOcaine (LIDODERM) 5 % Place 1 patch onto the skin once daily. Remove & Discard patch within 12 hours or as directed by MD.    LORazepam (ATIVAN) 1 MG tablet Take 1 tablet (1 mg total) by mouth 2 (two) times daily as needed for Anxiety.    magnesium oxide (MAG-OX) 400 mg tablet Take 400 mg by mouth once daily.    melatonin 10 mg Cap Take 10 mg by mouth nightly.    metoprolol succinate (TOPROL-XL) 25 MG 24 hr tablet Take 1 tablet (25 mg total) by mouth once daily.    multivitamin/iron/folic acid (CENTRUM COMPLETE ORAL) Take by mouth.    OZEMPIC 1 mg/dose (4 mg/3 mL) Inject 1 mg into the skin every 7 days.    pantoprazole (PROTONIX) 40 MG tablet  "Take 1 tablet (40 mg total) by mouth once daily.    pen needle, diabetic (BD ULTRA-FINE SAGAR PEN NEEDLE) 32 gauge x 5/32" Ndle One pen needle use with insulin pen 4 times a day.  ICD-10: E11.9    semaglutide (OZEMPIC) 0.25 mg or 0.5 mg (2 mg/3 mL) pen injector Inject 0.5 mg once weekly thereafter    ticagrelor (BRILINTA) 90 mg tablet Take 1 tablet (90 mg total) by mouth 2 (two) times daily.    vitamin E 1000 UNIT capsule Take 1,000 Units by mouth once daily.     Family History       Problem Relation (Age of Onset)    Allergy (severe) Daughter    Cancer Mother, Father    Diabetes Sister    Heart disease Mother    Hypertension Sister    Lung cancer Brother    No Known Problems Daughter          Tobacco Use    Smoking status: Never    Smokeless tobacco: Never   Substance and Sexual Activity    Alcohol use: Not Currently    Drug use: Never    Sexual activity: Not Currently     Partners: Male     Review of Systems   Constitutional:  Positive for activity change. Negative for chills and fever.   HENT:  Negative for congestion, hearing loss, rhinorrhea and sore throat.    Eyes:  Negative for visual disturbance.   Respiratory:  Negative for shortness of breath.    Cardiovascular:  Negative for chest pain and leg swelling.   Gastrointestinal:  Negative for abdominal pain, constipation, diarrhea, nausea and vomiting.   Genitourinary:  Negative for dysuria, frequency and urgency.   Musculoskeletal:  Positive for arthralgias and myalgias.   Neurological:  Positive for numbness (chronic).     Objective:     Vital Signs (Most Recent):  Temp: 96.7 °F (35.9 °C) (08/19/24 0751)  Pulse: 91 (08/19/24 0751)  Resp: 18 (08/19/24 0751)  BP: (!) 157/70 (08/19/24 0751)  SpO2: (!) 94 % (08/19/24 0751) Vital Signs (24h Range):  Temp:  [96.7 °F (35.9 °C)-98.8 °F (37.1 °C)] 96.7 °F (35.9 °C)  Pulse:  [] 91  Resp:  [18-19] 18  SpO2:  [94 %-98 %] 94 %  BP: (108-173)/(56-73) 157/70     Weight: 81.2 kg (179 lb 0.2 oz)  Body mass index is " 26.44 kg/m².     Physical Exam  Constitutional:       General: She is not in acute distress.     Appearance: Normal appearance. She is not ill-appearing.      Comments: Daughter at beside. Much improved mental status. Nurse at bedside.   HENT:      Head: Normocephalic and atraumatic.   Eyes:      Extraocular Movements: Extraocular movements intact.   Cardiovascular:      Rate and Rhythm: Normal rate and regular rhythm.      Heart sounds: Murmur heard.      No friction rub. No gallop.   Pulmonary:      Effort: Pulmonary effort is normal.      Breath sounds: Normal breath sounds. No wheezing, rhonchi or rales.   Abdominal:      General: There is no distension.      Tenderness: There is no abdominal tenderness. There is no guarding.   Musculoskeletal:         General: Tenderness and signs of injury present.      Right lower leg: No edema.      Left lower leg: No edema.      Comments: [place] to right ankle and right LE post op.  TTP over R hip  Sensation diminished to toes 2/2 neuropathy (chronic)   Neurological:      General: No focal deficit present.      Mental Status: She is alert and oriented to person, place, and time.      Comments: Tremors at times in arms, seen rarely.  Oriented to place, situation, surgery, hospital. Mental status much better and near baseline per family. Restraints being removed now. No hallucinations. She was surprised to hear of previous hallucinations                Significant Labs: All pertinent labs within the past 24 hours have been reviewed.  BMP:   Recent Labs   Lab 08/19/24  0530   *      K 4.1      CO2 21*   BUN 40*   CREATININE 1.7*   CALCIUM 7.9*   MG 1.8     CBC:   Recent Labs   Lab 08/18/24  0548 08/19/24  0530   WBC 10.48 7.12   HGB 10.8* 9.0*   HCT 32.4* 28.6*    166       Significant Imaging: I have reviewed all pertinent imaging results/findings within the past 24 hours.    Imaging Results              CT Ankle (Including Hindfoot) Without  Contrast Right (Final result)  Result time 08/14/24 04:49:46      Final result by Portillo Oquendo MD (08/14/24 04:49:46)                   Impression:      Near anatomic alignment of complex mildly displaced distal tibia fracture and mildly displaced distal fibular fracture.    Electronically signed by resident: Hira Patel  Date:    08/14/2024  Time:    03:56    Electronically signed by: Portillo Oquendo MD  Date:    08/14/2024  Time:    04:49               Narrative:    EXAMINATION:  CT ANKLE (INCLUDING HINDFOOT) WITHOUT CONTRAST RIGHT; CT 3D RENDERING WO INDEPENDENT WORKSTATION    CLINICAL HISTORY:  Fracture, ankle;; Fracture, ankle;per order request;    TECHNIQUE:  Axial 0.625-mm images of the right ankle obtained without intravenous contrast.  Data submitted for coronal and sagittal reformats.  3D reconstructed images were acquired by post processing on an independent workstation with concurrent supervision and archived for permanent record. 3D imaging of the right ankle was obtained for further evaluation and operative planning if needed.    COMPARISON:  Ankle radiograph 03/14/2024.    FINDINGS:  Bones are demineralized.  There is a cast in place.  There is mildly displaced comminuted distal tibial fracture and a minimally displaced distal fibular fracture with medial angulation of the fracture fragment.  Intra-articular extension fracture fragments which are mildly displaced involving the distal tibia near anatomic alignment of fracture fragments.  Fractures involve the posterior and medial malleoli.  No dislocation.  There are a few foci of subcutaneous gas overlying the tibial fracture, possibly relating to trauma or reduction procedure though correlation is advised.  Dense calcific tendinosis of the posterior tibial tendon.  No full-thickness injury of the Achilles tendon.  Soft tissue edema about the ankle.  Prominent vascular calcifications noted.                                       CT 3D  Rendering WO Independent Workstation (Final result)  Result time 08/14/24 04:49:46      Final result by Portillo Oquendo MD (08/14/24 04:49:46)                   Impression:      Near anatomic alignment of complex mildly displaced distal tibia fracture and mildly displaced distal fibular fracture.    Electronically signed by resident: Hira Patel  Date:    08/14/2024  Time:    03:56    Electronically signed by: Portillo Oquendo MD  Date:    08/14/2024  Time:    04:49               Narrative:    EXAMINATION:  CT ANKLE (INCLUDING HINDFOOT) WITHOUT CONTRAST RIGHT; CT 3D RENDERING WO INDEPENDENT WORKSTATION    CLINICAL HISTORY:  Fracture, ankle;; Fracture, ankle;per order request;    TECHNIQUE:  Axial 0.625-mm images of the right ankle obtained without intravenous contrast.  Data submitted for coronal and sagittal reformats.  3D reconstructed images were acquired by post processing on an independent workstation with concurrent supervision and archived for permanent record. 3D imaging of the right ankle was obtained for further evaluation and operative planning if needed.    COMPARISON:  Ankle radiograph 03/14/2024.    FINDINGS:  Bones are demineralized.  There is a cast in place.  There is mildly displaced comminuted distal tibial fracture and a minimally displaced distal fibular fracture with medial angulation of the fracture fragment.  Intra-articular extension fracture fragments which are mildly displaced involving the distal tibia near anatomic alignment of fracture fragments.  Fractures involve the posterior and medial malleoli.  No dislocation.  There are a few foci of subcutaneous gas overlying the tibial fracture, possibly relating to trauma or reduction procedure though correlation is advised.  Dense calcific tendinosis of the posterior tibial tendon.  No full-thickness injury of the Achilles tendon.  Soft tissue edema about the ankle.  Prominent vascular calcifications noted.                                        X-Ray Ankle Complete Right (Final result)  Result time 08/14/24 03:44:09      Final result by Portillo Oquendo MD (08/14/24 03:44:09)                   Impression:      Similar alignment of distal tibia and distal fibula fractures post reduction.      Electronically signed by: Portillo Oquendo MD  Date:    08/14/2024  Time:    03:44               Narrative:    EXAMINATION:  XR ANKLE COMPLETE 3 VIEW RIGHT    CLINICAL HISTORY:  Post reduction;    TECHNIQUE:  AP, lateral, and oblique images of the right ankle were performed.    COMPARISON:  08/13/2024.    FINDINGS:  Exam quality is limited by suboptimal positioning and overlapping cast material.  Acute fractures of the distal tibia and distal fibula as seen previously.  Bones are demineralized.  No gross dislocation or significant worsening alignment of fracture fragments.  Soft tissue edema.                                       CT Pelvis Without Contrast (Final result)  Result time 08/13/24 20:58:12      Final result by Quiana Chawla MD (08/13/24 20:58:12)                   Impression:      Acute traumatic fractures involving the right iliac wing, the anterior pillar of the right acetabulum and the lateral most aspect of the right superior ramus, and the right inferior pubic ramus.  No hip dislocation.      Electronically signed by: Quiana Chawla  Date:    08/13/2024  Time:    20:58               Narrative:    EXAMINATION:  CT PELVIS WITHOUT CONTRAST    CLINICAL HISTORY:  Pelvic trauma;    TECHNIQUE:  1.25 mm axial images were obtained through the pelvis from above the iliac crest through the upper femoral shafts.    COMPARISON:  Plain hip radiograph 08/13/2020 full    FINDINGS:  There is a minimally displaced fracture of the right iliac wing.  There are comminuted fractures involving the anterior pillar of the right acetabulum and the lateral most aspect of the right superior ramus.  There is comminuted and overlapping fracture of the right  inferior pubic ramus.  The hips are not dislocated.  The SI joints are intact.  There are degenerative changes seen at the sacroiliac joints and the pubic symphysis.  Bones are osteopenic.  Incidental note is made of vascular calcifications and a small fat containing umbilical hernia.                                       CT Lumbar Spine Without Contrast (Final result)  Result time 08/13/24 20:31:47      Final result by Quiana Chawla MD (08/13/24 20:31:47)                   Impression:      No acute lumbar fracture.  Multilevel degenerative change greatest at L4/L5.    Small bilateral pleural effusions right greater than left.    Gallbladder sludge or gravel-like gallstones.      Electronically signed by: Quiana Chawla  Date:    08/13/2024  Time:    20:31               Narrative:    EXAMINATION:  CT OF THE LUMBAR SPINE WITHOUT    CLINICAL HISTORY:  Complaining of right hip pain secondary to fall from standing.    TECHNIQUE:  1.25 mm axial images were obtained through the lumbar spine. Coronal and sagittal reformatted images were provided.    COMPARISON:  None.    FINDINGS:  There is no lumbar vertebral body fracture.  There is grade 1 anterolisthesis of L4 on L5.  At L3/L4, there is moderate central canal narrowing secondary to the facet arthrosis and ligamentum flavum hypertrophy without significant foraminal stenosis.  At L4/L5, there is no stone significant central canal narrowing, but there is bilateral mild foraminal narrowing.  Secondary to ligamentum flavum hypertrophy and facet arthrosis.  At L5/S1, there is a broad-based disc bulge without any significant central canal stenosis or foraminal stenosis.  There is small marginal osteophytes throughout.  Vacuum phenomena is seen at L4/L5 with mild intervertebral disc space narrowing.  Incidental note is made of small bilateral pleural effusions right greater than left and gallbladder sludge or gravel-like gallstones.                                        X-Ray Chest AP Portable (Final result)  Result time 08/13/24 17:49:32      Final result by Eddie Montilla MD (08/13/24 17:49:32)                   Impression:      1. Interstitial findings may reflect edema versus chronic change, no large focal consolidation.      Electronically signed by: Eddie Montilla MD  Date:    08/13/2024  Time:    17:49               Narrative:    EXAMINATION:  XR CHEST AP PORTABLE    CLINICAL HISTORY:  Chest Pain;    TECHNIQUE:  Single frontal view of the chest was performed.    COMPARISON:  11/03/2023    FINDINGS:  The cardiomediastinal silhouette is prominent, magnified by technique, similar to the previous exam noting calcification of the aorta..  There is no pleural effusion.  The trachea is midline.  The lungs are symmetrically expanded bilaterally with coarse interstitial attenuation in a central hilar distribution..  No large focal consolidation seen.  There is no pneumothorax.  The osseous structures are remarkable for degenerative changes..                                       X-Ray Ankle Complete Right (Final result)  Result time 08/13/24 17:50:39      Final result by Eddie Montilla MD (08/13/24 17:50:39)                   Impression:      1. Distal tibial and fibular fractures as described.      Electronically signed by: Eddie Montilla MD  Date:    08/13/2024  Time:    17:50               Narrative:    EXAMINATION:  XR ANKLE COMPLETE 3 VIEW RIGHT    CLINICAL HISTORY:  Unspecified fall, initial encounter    TECHNIQUE:  AP, lateral, and oblique images of the right ankle were performed.    COMPARISON:  Radiograph of the foot 06/01/2021    FINDINGS:  Three views right ankle.    There is osteopenia.  There is acute appearing fracture involving the distal aspect of the fibula.  There is comminuted fracture of the medial malleolus.  The ankle mortise is intact.  There is edema about the ankle.  The posterior aspect of the tibia appears intact.  There are degenerative  changes of the calcaneus.  There is vascular calcification.                                       X-Ray Hip 2 or 3 views Right with Pelvis when performed (Final result)  Result time 08/13/24 17:53:13      Final result by Eddie Mnotilla MD (08/13/24 17:53:13)                   Impression:      1. Fractures of the right inferior and superior pubic rami.  2. There is cortical irregularity involving the right iliac wing, concerning for additional fracture.      Electronically signed by: Eddie Montilla MD  Date:    08/13/2024  Time:    17:53               Narrative:    EXAMINATION:  XR HIP WITH PELVIS WHEN PERFORMED 2 OR 3 VIEWS RIGHT    CLINICAL HISTORY:  Unspecified fall, initial encounter    TECHNIQUE:  AP view of the pelvis and frog leg lateral view of the right hip were performed.    COMPARISON:  None    FINDINGS:  Three views right hip.    The bilateral sacroiliac joints are intact.  The pubic symphysis is intact.  There is osteopenia.  There are fractures of the right superior and inferior pubic rami.  The bilateral femoroacetabular joints are intact noting degenerative change.  There is fracture involving the right iliac wing.                                       X-Ray Knee 1 or 2 View Right (Final result)  Result time 08/13/24 17:53:54   Procedure changed from X-Ray Knee 3 View Right     Final result by Eddie Montilla MD (08/13/24 17:53:54)                   Impression:      1. No acute displaced fracture or dislocation of the knee.      Electronically signed by: Eddie Montilla MD  Date:    08/13/2024  Time:    17:53               Narrative:    EXAMINATION:  XR KNEE 1 OR 2 VIEW RIGHT    CLINICAL HISTORY:  Pain;  Unspecified fall, initial encounter    TECHNIQUE:  AP and lateral views of the right knee were performed.    COMPARISON:  11/20/2014    FINDINGS:  Two views right knee.    There is osteopenia.  There are degenerative changes of the knee.  There is vascular calcification.  No large right knee  joint effusion.                                        Assessment/Plan:      * Closed right pilon fracture  Fall with ankle fx  -transferred here for ortho  -s/p Open reduction internal fixation right ankle pilon fracture with fixation of tibia and fibula on 8/14. NWB for 10 weeks  Per ortho:    We will remove splint, change dressing, and place a well-padded short-leg fiberglass cast prior to hospital discharge.  After incision healed, and sutures removed, likely 3 weeks postop, consider placement into a cam boot versus continuing the cast, depending on patient preference, inability to place an remove Cam boot        X-rays at subsequent followups:  Right ankle  Pelvis Judet     Follow-up postop  2-3 weeks - remove cast, remove sutures, consider short-leg cast versus Cam boot, based on patient's preference  6 weeks, 3 months, 6 months, 1 year    -pain control with multimodals, bowel regimen  -PT/OT  -eliquis for dvt ppx now post op from pelvic surgery  for 10 weeks while NWB- oct 26 end date      Abdominal pain  Has chronic issues, takes maalox at home, reports having here burning with eating and requests and added scheduled now with tums prn  Constipation also making her feel full, suppository added 8/18 not given until 8/19.      Constipation  Abd pain so avoiding extra oral regimen 8/18 and ageeeable to suppository-- wasn't given for ucnlear reasons 8/18 so given thsi Am with nursing. If not resolved will do enema      Delirium  Present immediately post op in pacu and has been on/off with severe anxiety but now is full delirium with hallucinating and talking to the wall and seeing people/babies. Has UTIS on treatment but hasn't slept in over 48 hours and PRNS not improving with probably worsening delirium the last 24 hours, minimal responsive to zyprexa x2, iv benadryl once, and small dose ativan on 7/17 to try to calm and help sleep.  Psych consulted 8/18 to help with PRNS and sleep to try to get her on meds to  promote her to sleep and for agitation until can get back to baseline while treating medical contributors as well  Psych rec depakoate IV TID + melatonin/seroquel scheduled qhs. Apprecaite recs, as patient now with finally improved delirium 8/19 after sleeping.      Hypoalbuminemia  Start boost glucose control      Hypocalcemia  Hypocalcemia is likely due to  secondary to poor intake and with low albumin lower than true result, with corrected is 8.4 and still low so start replacement . The most recent calcium and albumin results listed below.  Recent Labs     08/15/24  0457 08/16/24  0456 08/17/24  0430   CALCIUM 8.0* 8.0* 6.2*   ALBUMIN 2.2* 2.0* 1.3*     Plan  - Will replace calcium and monitor electrolytes closely.  - The patient's hypocalcemia is stable  - replace        Metabolic acidosis  Na bicarb started, chronic from ckd and likely will need long term      Tremor  Suspect possible from gabapentin as exam with some spotnaneous myoclonic jerking and hyperreflexic on exam and has been intermittent and unclear of timing, has seen neuro in clinic and had parkinsons ruled out she said but no further work up into cause  -hold gabapentin now, as variable cr may have worsened symptoms if related to this and see if improves, may take 48 hours to improve if so  Still present but worse Cr so may take more time to clear gabapentin if from this        Acute blood loss anemia  Anemia is likely due to acute blood loss which was from surgery . Most recent hemoglobin and hematocrit are listed below.  Recent Labs     08/17/24  0430 08/18/24  0548 08/19/24  0530   HGB 6.8* 10.8* 9.0*   HCT 20.9* 32.4* 28.6*       Plan  - Monitor serial CBC: Daily  - Transfuse PRBC if patient becomes hemodynamically unstable, symptomatic or H/H drops below 7/21.  - Patient has received 2 units of PRBCs on 8/17  - Patient's anemia is currently  downtrend post op  Expected with large surgery  Improved to 10 on 8/18 now after 2  U-->9      Transaminitis  - AST/ ALT mildly elevated  - hold statin  -slightly up at 50--42    Multiple fractures of pelvis  Closed fracture of right iliac wing  Fracture of acetabulum  - AF, VSS  - Xray Hip showed fractures of the right inferior and superior pubic rami   - CT Pelvis showed acute traumatic fractures involving the right iliac wing, the anterior pillar of the right acetabulum and the lateral most aspect of the right superior ramus, and the right inferior pubic ramus   - Ortho consulted- and OR 8/16 with jade with Open reduction internal fixation right both column acetabulum fracture . Multimodal plan control, eliquis started post op for dvt ppx- ortho recs for 10 weeks post op- October 26th end date  -10 weeks NWB to RLE given ankle fx also present now  -PT/OT, plan for SNF, reports some pain 8/18      Acute cystitis  Urine from [facility] [place], [place] resulted 8/16 with E. Faecalis and ecoli late so was not initially on treatment, may be cause for continued confusion and not totally herself since admit her daughter has reported, start vanc for e faecalis and rocephin for ecoli as its resistant to ampicilin so can't use that to cover both unfortunately  Plan for 5 day course given extended delirium, so katy plan until 8/20      KYA (acute kidney injury)  KYA is likely due to pre-renal azotemia due to intravascular volume depletion. Baseline creatinine is  1.6 to 1.8 . Most recent creatinine and eGFR are listed below.  Recent Labs     08/17/24  0430 08/18/24  0548 08/19/24  0530   CREATININE 1.7* 1.9* 1.7*   EGFRNORACEVR 31.5* 27.5* 31.5*        Plan  - KYA is improving Cr 1.7 today and in baseine ranges after IVF down from 2.4, slightly back up 8/18 at 1.9, and monitor, oral intake starting to improve a little-- 1.7  - Avoid nephrotoxins and renally dose meds for GFR listed above  - Monitor urine output, serial BMP, and adjust therapy as needed    -     Chronic diastolic heart failure  - euvolemic on exam  - BNP  536  - CXR showed interstitial findings may reflect edema versus chronic change, no pleural effusion  - pt is not on a diuretic at this time  - continue to monitor volume status closely  Results for orders placed during the hospital encounter of 11/03/23    Echo    Interpretation Summary    Left Ventricle: The left ventricle is normal in size. Increased wall thickness. Moderate septal thickening. Normal wall motion. There is normal systolic function with a visually estimated ejection fraction of 65 - 70%. Grade I diastolic dysfunction.    Right Ventricle: Normal right ventricular cavity size. Systolic function is normal.    Left Atrium: Left atrium is severely dilated.    Aortic Valve: There is moderate stenosis. Aortic valve area by VTI is 1.02 cm². Aortic valve peak velocity is 2.59 m/s. Mean gradient is 18 mmHg. The dimensionless index is 0.32.    Mitral Valve: There is mild regurgitation.    Tricuspid Valve: There is mild regurgitation.    Pulmonary Artery: The estimated pulmonary artery systolic pressure is 35 mmHg.    IVC/SVC: Normal venous pressure at 3 mmHg.        Stage 3a chronic kidney disease  Creatine stable for now. BMP reviewed- noted Estimated Creatinine Clearance: 33.6 mL/min (A) (based on SCr of 1.7 mg/dL (H)). according to latest data. Based on current GFR, CKD stage is stage 4 - GFR 15-29.  Monitor UOP and serial BMP and adjust therapy as needed. Renally dose meds. Avoid nephrotoxic medications and procedures.  - Cr 1.9 (near most recent baseline which is 1.6 to 1.8) on admit but 2.2 now so will do light IVF on 8/15 but slightly worse at 2.4 now with bobbi and closer to baseline 8/17 at 1.7-->1.9--1.7 now  - daily BMP  - avoid nephrotoxic medications    Hypomagnesemia  Patient has Abnormal Magnesium: hypomagnesemia. Will continue to monitor electrolytes closely. Will replace the affected electrolytes and repeat labs to be done after interventions completed. The patient's magnesium results have been  reviewed and are listed below.  Recent Labs   Lab 08/15/24  0457   MG 2.2        Coronary artery disease of native artery of native heart with stable angina pectoris  Patient with known CAD which is controlled Will continue Statin and monitor for S/Sx of angina/ACS. Continue to monitor on telemetry.   Had stents placed in 2018 to rca and 2020 to 2 areas per dr cervantes note from [facility], had stress in 2022 that was negative for ischemia. She said he wanted to recheck another one recently but insurance did not cover. She said had aspirin allergy testing before it was restarted due to prev intolerance after a stent. Resume asa pending op plans.    Type 2 diabetes mellitus with stage 3 chronic kidney disease, with long-term current use of insulin  Hard to titrate at baseline and had to have doctors adjust a lot, runs very high at times up to 1100 she said once even.   Titrate 8/15, running higher as didn't get long acting yesterday and adjusted today and will adjsut further. Has some allergies to some insulins. On tresiba at home.  - low dose SSI  - diabetic diet  - POCT checks  Baseline runs very high per dr schmidt notes she sees with endocrine outpatient. Baseline average 253 glucose on dexcom he reports. Dexcom removed for OR. On drip in OR wasn't given llantus this AM though. Will incease dose to giev now in pacu and inc to moderate sliding scale and inc novolog and will likely titrate here as not contolled at home.  Glucose in 100s on 8/17 after 28 U lantus last night and moderate slidign scale. Isnt eating much on ful liquids now, so hold novolog unless glucose is >160 with meals and eating at least half of meal if is in 100s but she tends to run higher even when NPo yesterday was 300s in pacu so titrate further based on trending today and will watch closely. Has had no hypoglycemia on dexcom per above.  Glucose better but not eating, just starting to eat 8/18 so will likely need to titrate more  Improved 8/19,  glucose lwoer 100s, holding novolgo with breakfast as didn't eat too much, nursing comm to hold novolog if eats less than half meal and glucose under 160 and titrate down dose also while is not eating as much     Mixed hyperlipidemia  - continue home statin    Primary hypertension  - Chronic, controlled  - continue home imdur and metoprolol-- inc imdur 8/18    Anxiety  - hx noted  - continue home fluoxetine   High anxiety at baseline per her and daugher, doctors have been hesitant to do acute anxiety meds due to fall risk they said, was not on on admit, high anxiety 8/15, will try ativan x 1 as suspect having possible panic attack after therapy sesion based on nusring description and did better after this  Very anxious in pacu and anesthesia bedside, giving precedex x 1 to relex,and required zyprexa on 8/16 PM and restraints to calm down as confused and anxious.  Will give ativan once as showing signs 8/17 of panic with chest pressure and tingling.  Now more delirious in later 8/17 pm and 8/18 worsening, talking to wall/etc. See delirium  Improved now 8/19, near baseilne now      VTE Risk Mitigation (From admission, onward)           Ordered     apixaban tablet 2.5 mg  2 times daily         08/17/24 0746     Reason for No Pharmacological VTE Prophylaxis  Once        Question:  Reasons:  Answer:  Risk of Bleeding    08/14/24 0120     IP VTE HIGH RISK PATIENT  Once         08/14/24 0120                    Discharge Planning   MIKHAIL: 8/21/2024     Code Status: Full Code   Is the patient medically ready for discharge?:     Reason for patient still in hospital (select all that apply): Patient trending condition  Discharge Plan A: Skilled Nursing Facility                  Rupal Ochoa MD  Department of Hospital Medicine   [facility] - Surgery

## 2024-08-19 NOTE — SUBJECTIVE & OBJECTIVE
Interval history- doing much better this AM mental status wise with daughter at bedside as well as nursing. Restraints removed by nursing during exam as agitation/confusion improved after depakoate IV TID added yesterday afternoon per psych recs and seroquel/melatonin scheduled qhs per recs and appreciaet assistance as slept most if not all of night per nurse report from overnight nursing and they didn't have any issues reported overnihgt on check out this AM per nursing. Suppository not given yesterday for unclear reasons and nursing gave this AM as no Bm since admit, still some abd fullness but starting to eat more per daughter and was askign for chicken last night. Still reports some throat scratchiness causing swallowing to be slower and family concerned could cause issues and are nervous to updgrade without eval so speech eval placed to see how she does before considering upgrading from liquids she's been on post op which was iniitalyl for nausea but now with this to check before considering. Benoit to remove toady now out of restraints and to do purewick, she likes purewick and has wanted to get one at home even. BP improving. Glucose in lower 100s so hold novolog with breakfast and caveat to hold mealtime novolog if eats less than half of meal and glucose under 160 and do sliding scale if so, as suspect once eating more will spike up as was hard to control when eating pre op. Hg stable at 9 and Cr 1.7. she is alert and oriented to stiuation, family, place and more like herself and daughter reports close to baseline now. She still has occasional tremor that was present on admit but was worse on admit, has had neuro work up as outpatient before and have been holding gabapentin as possible contributor as well.  Will need to be out of restraints 24 hours at least for SNF placement. Would plan to do a few extra days iv antibiotics given her confusion from lack of sleep + UTI and do 5 days for UTi with end date  Wednesday and watch another day given prolonged delirium so would plan for ready for discharge potentialyl Wednesday and CM updated.              Review of patient's allergies indicates:   Allergen Reactions    Novolin 70/30 (semi-synthetic) Nausea And Vomiting     Severe vomiting on Relion 70/30    Sulfa (sulfonamide antibiotics) Anaphylaxis    Talwin [pentazocine lactate] Anaphylaxis    Victoza [liraglutide] Nausea And Vomiting    Glipizide Nausea Only    Citric acid     Codeine     Influenza virus vaccines Hives    Iodine and iodide containing products Hives    Levetiracetam Itching    Rituxan [rituximab] Hives    Zoloft [sertraline] Nausea And Vomiting       No current facility-administered medications on file prior to encounter.     Current Outpatient Medications on File Prior to Encounter   Medication Sig    Bacillus coagulans (DIGESTIVE ADVANTAGE IMMUNE ORAL) Take by mouth.    diclofenac sodium (VOLTAREN TOP) Apply topically.    gabapentin (NEURONTIN) 300 MG capsule Take 1 capsule (300 mg total) by mouth 3 (three) times daily.    hydrALAZINE (APRESOLINE) 50 MG tablet Take 1 tablet (50 mg total) by mouth every 8 (eight) hours.    LANTUS SOLOSTAR U-100 INSULIN 100 unit/mL (3 mL) InPn pen Inject 12 Units into the skin every evening.    NOVOLOG FLEXPEN U-100 INSULIN 100 unit/mL (3 mL) InPn pen Inject 20 Units into the skin 3 (three) times daily with meals.    albuterol (PROVENTIL/VENTOLIN HFA) 90 mcg/actuation inhaler Inhale 2 puffs into the lungs every 6 (six) hours as needed for Wheezing or Shortness of Breath.    ASCORBIC ACID, VITAMIN C, ORAL Take by mouth once daily.    aspirin 81 MG Chew Take 1 tablet (81 mg total) by mouth once daily.    atorvastatin (LIPITOR) 80 MG tablet Take 1 tablet by mouth in the evening    cholecalciferol, vitamin D3, (VITAMIN D3) 50 mcg (2,000 unit) Cap Take 2 capsules by mouth 2 (two) times daily.    cyanocobalamin (VITAMIN B-12) 1000 MCG tablet Take 100 mcg by mouth once daily.  "   DEXCOM G7  Misc Use 1  to track blood glucose, ICD10: E11.65    DEXCOM G7 SENSOR Samina Use 1 sensor every 10 days to track blood glucose, ICD10: E11.65, okay with 90 day supply if possible    EPINEPHrine (EPIPEN) 0.3 mg/0.3 mL AtIn Inject 0.3 mLs (0.3 mg total) into the muscle once. for 1 dose    ESOMEPRAZOLE MAGNESIUM ORAL Take by mouth as needed. (Patient not taking: Reported on 5/28/2024)    FLUoxetine 40 MG capsule Take 1 capsule (40 mg total) by mouth once daily.    isosorbide mononitrate (IMDUR) 30 MG 24 hr tablet Take 1 tablet (30 mg total) by mouth once daily.    LIDOcaine (LIDODERM) 5 % Place 1 patch onto the skin once daily. Remove & Discard patch within 12 hours or as directed by MD.    LORazepam (ATIVAN) 1 MG tablet Take 1 tablet (1 mg total) by mouth 2 (two) times daily as needed for Anxiety.    magnesium oxide (MAG-OX) 400 mg tablet Take 400 mg by mouth once daily.    melatonin 10 mg Cap Take 10 mg by mouth nightly.    metoprolol succinate (TOPROL-XL) 25 MG 24 hr tablet Take 1 tablet (25 mg total) by mouth once daily.    multivitamin/iron/folic acid (CENTRUM COMPLETE ORAL) Take by mouth.    OZEMPIC 1 mg/dose (4 mg/3 mL) Inject 1 mg into the skin every 7 days.    pantoprazole (PROTONIX) 40 MG tablet Take 1 tablet (40 mg total) by mouth once daily.    pen needle, diabetic (BD ULTRA-FINE SAGAR PEN NEEDLE) 32 gauge x 5/32" Ndle One pen needle use with insulin pen 4 times a day.  ICD-10: E11.9    semaglutide (OZEMPIC) 0.25 mg or 0.5 mg (2 mg/3 mL) pen injector Inject 0.5 mg once weekly thereafter    ticagrelor (BRILINTA) 90 mg tablet Take 1 tablet (90 mg total) by mouth 2 (two) times daily.    vitamin E 1000 UNIT capsule Take 1,000 Units by mouth once daily.     Family History       Problem Relation (Age of Onset)    Allergy (severe) Daughter    Cancer Mother, Father    Diabetes Sister    Heart disease Mother    Hypertension Sister    Lung cancer Brother    No Known Problems Daughter      "     Tobacco Use    Smoking status: Never    Smokeless tobacco: Never   Substance and Sexual Activity    Alcohol use: Not Currently    Drug use: Never    Sexual activity: Not Currently     Partners: Male     Review of Systems   Constitutional:  Positive for activity change. Negative for chills and fever.   HENT:  Negative for congestion, hearing loss, rhinorrhea and sore throat.    Eyes:  Negative for visual disturbance.   Respiratory:  Negative for shortness of breath.    Cardiovascular:  Negative for chest pain and leg swelling.   Gastrointestinal:  Negative for abdominal pain, constipation, diarrhea, nausea and vomiting.   Genitourinary:  Negative for dysuria, frequency and urgency.   Musculoskeletal:  Positive for arthralgias and myalgias.   Neurological:  Positive for numbness (chronic).     Objective:     Vital Signs (Most Recent):  Temp: 96.7 °F (35.9 °C) (08/19/24 0751)  Pulse: 91 (08/19/24 0751)  Resp: 18 (08/19/24 0751)  BP: (!) 157/70 (08/19/24 0751)  SpO2: (!) 94 % (08/19/24 0751) Vital Signs (24h Range):  Temp:  [96.7 °F (35.9 °C)-98.8 °F (37.1 °C)] 96.7 °F (35.9 °C)  Pulse:  [] 91  Resp:  [18-19] 18  SpO2:  [94 %-98 %] 94 %  BP: (108-173)/(56-73) 157/70     Weight: 81.2 kg (179 lb 0.2 oz)  Body mass index is 26.44 kg/m².     Physical Exam  Constitutional:       General: She is not in acute distress.     Appearance: Normal appearance. She is not ill-appearing.      Comments: Daughter at beside. Much improved mental status. Nurse at bedside.   HENT:      Head: Normocephalic and atraumatic.   Eyes:      Extraocular Movements: Extraocular movements intact.   Cardiovascular:      Rate and Rhythm: Normal rate and regular rhythm.      Heart sounds: Murmur heard.      No friction rub. No gallop.   Pulmonary:      Effort: Pulmonary effort is normal.      Breath sounds: Normal breath sounds. No wheezing, rhonchi or rales.   Abdominal:      General: There is no distension.      Tenderness: There is no  abdominal tenderness. There is no guarding.   Musculoskeletal:         General: Tenderness and signs of injury present.      Right lower leg: No edema.      Left lower leg: No edema.      Comments: [place] to right ankle and right LE post op.  TTP over R hip  Sensation diminished to toes 2/2 neuropathy (chronic)   Neurological:      General: No focal deficit present.      Mental Status: She is alert and oriented to person, place, and time.      Comments: Tremors at times in arms, seen rarely.  Oriented to place, situation, surgery, hospital. Mental status much better and near baseline per family. Restraints being removed now. No hallucinations. She was surprised to hear of previous hallucinations                Significant Labs: All pertinent labs within the past 24 hours have been reviewed.  BMP:   Recent Labs   Lab 08/19/24  0530   *      K 4.1      CO2 21*   BUN 40*   CREATININE 1.7*   CALCIUM 7.9*   MG 1.8     CBC:   Recent Labs   Lab 08/18/24  0548 08/19/24  0530   WBC 10.48 7.12   HGB 10.8* 9.0*   HCT 32.4* 28.6*    166       Significant Imaging: I have reviewed all pertinent imaging results/findings within the past 24 hours.    Imaging Results              CT Ankle (Including Hindfoot) Without Contrast Right (Final result)  Result time 08/14/24 04:49:46      Final result by Portillo Oquendo MD (08/14/24 04:49:46)                   Impression:      Near anatomic alignment of complex mildly displaced distal tibia fracture and mildly displaced distal fibular fracture.    Electronically signed by resident: Hira Patel  Date:    08/14/2024  Time:    03:56    Electronically signed by: Portillo Oquendo MD  Date:    08/14/2024  Time:    04:49               Narrative:    EXAMINATION:  CT ANKLE (INCLUDING HINDFOOT) WITHOUT CONTRAST RIGHT; CT 3D RENDERING WO INDEPENDENT WORKSTATION    CLINICAL HISTORY:  Fracture, ankle;; Fracture, ankle;per order request;    TECHNIQUE:  Axial 0.625-mm  images of the right ankle obtained without intravenous contrast.  Data submitted for coronal and sagittal reformats.  3D reconstructed images were acquired by post processing on an independent workstation with concurrent supervision and archived for permanent record. 3D imaging of the right ankle was obtained for further evaluation and operative planning if needed.    COMPARISON:  Ankle radiograph 03/14/2024.    FINDINGS:  Bones are demineralized.  There is a cast in place.  There is mildly displaced comminuted distal tibial fracture and a minimally displaced distal fibular fracture with medial angulation of the fracture fragment.  Intra-articular extension fracture fragments which are mildly displaced involving the distal tibia near anatomic alignment of fracture fragments.  Fractures involve the posterior and medial malleoli.  No dislocation.  There are a few foci of subcutaneous gas overlying the tibial fracture, possibly relating to trauma or reduction procedure though correlation is advised.  Dense calcific tendinosis of the posterior tibial tendon.  No full-thickness injury of the Achilles tendon.  Soft tissue edema about the ankle.  Prominent vascular calcifications noted.                                       CT 3D Rendering WO Independent Workstation (Final result)  Result time 08/14/24 04:49:46      Final result by Portillo Oquendo MD (08/14/24 04:49:46)                   Impression:      Near anatomic alignment of complex mildly displaced distal tibia fracture and mildly displaced distal fibular fracture.    Electronically signed by resident: Hira Patel  Date:    08/14/2024  Time:    03:56    Electronically signed by: Portillo Oquendo MD  Date:    08/14/2024  Time:    04:49               Narrative:    EXAMINATION:  CT ANKLE (INCLUDING HINDFOOT) WITHOUT CONTRAST RIGHT; CT 3D RENDERING WO INDEPENDENT WORKSTATION    CLINICAL HISTORY:  Fracture, ankle;; Fracture, ankle;per order  request;    TECHNIQUE:  Axial 0.625-mm images of the right ankle obtained without intravenous contrast.  Data submitted for coronal and sagittal reformats.  3D reconstructed images were acquired by post processing on an independent workstation with concurrent supervision and archived for permanent record. 3D imaging of the right ankle was obtained for further evaluation and operative planning if needed.    COMPARISON:  Ankle radiograph 03/14/2024.    FINDINGS:  Bones are demineralized.  There is a cast in place.  There is mildly displaced comminuted distal tibial fracture and a minimally displaced distal fibular fracture with medial angulation of the fracture fragment.  Intra-articular extension fracture fragments which are mildly displaced involving the distal tibia near anatomic alignment of fracture fragments.  Fractures involve the posterior and medial malleoli.  No dislocation.  There are a few foci of subcutaneous gas overlying the tibial fracture, possibly relating to trauma or reduction procedure though correlation is advised.  Dense calcific tendinosis of the posterior tibial tendon.  No full-thickness injury of the Achilles tendon.  Soft tissue edema about the ankle.  Prominent vascular calcifications noted.                                       X-Ray Ankle Complete Right (Final result)  Result time 08/14/24 03:44:09      Final result by Portillo Oquendo MD (08/14/24 03:44:09)                   Impression:      Similar alignment of distal tibia and distal fibula fractures post reduction.      Electronically signed by: Portillo Oquendo MD  Date:    08/14/2024  Time:    03:44               Narrative:    EXAMINATION:  XR ANKLE COMPLETE 3 VIEW RIGHT    CLINICAL HISTORY:  Post reduction;    TECHNIQUE:  AP, lateral, and oblique images of the right ankle were performed.    COMPARISON:  08/13/2024.    FINDINGS:  Exam quality is limited by suboptimal positioning and overlapping cast material.  Acute fractures of  the distal tibia and distal fibula as seen previously.  Bones are demineralized.  No gross dislocation or significant worsening alignment of fracture fragments.  Soft tissue edema.                                       CT Pelvis Without Contrast (Final result)  Result time 08/13/24 20:58:12      Final result by Quiana Chawla MD (08/13/24 20:58:12)                   Impression:      Acute traumatic fractures involving the right iliac wing, the anterior pillar of the right acetabulum and the lateral most aspect of the right superior ramus, and the right inferior pubic ramus.  No hip dislocation.      Electronically signed by: Quiana Chawla  Date:    08/13/2024  Time:    20:58               Narrative:    EXAMINATION:  CT PELVIS WITHOUT CONTRAST    CLINICAL HISTORY:  Pelvic trauma;    TECHNIQUE:  1.25 mm axial images were obtained through the pelvis from above the iliac crest through the upper femoral shafts.    COMPARISON:  Plain hip radiograph 08/13/2020 full    FINDINGS:  There is a minimally displaced fracture of the right iliac wing.  There are comminuted fractures involving the anterior pillar of the right acetabulum and the lateral most aspect of the right superior ramus.  There is comminuted and overlapping fracture of the right inferior pubic ramus.  The hips are not dislocated.  The SI joints are intact.  There are degenerative changes seen at the sacroiliac joints and the pubic symphysis.  Bones are osteopenic.  Incidental note is made of vascular calcifications and a small fat containing umbilical hernia.                                       CT Lumbar Spine Without Contrast (Final result)  Result time 08/13/24 20:31:47      Final result by Quiana Chawla MD (08/13/24 20:31:47)                   Impression:      No acute lumbar fracture.  Multilevel degenerative change greatest at L4/L5.    Small bilateral pleural effusions right greater than left.    Gallbladder sludge or gravel-like  gallstones.      Electronically signed by: Quiana Chawla  Date:    08/13/2024  Time:    20:31               Narrative:    EXAMINATION:  CT OF THE LUMBAR SPINE WITHOUT    CLINICAL HISTORY:  Complaining of right hip pain secondary to fall from standing.    TECHNIQUE:  1.25 mm axial images were obtained through the lumbar spine. Coronal and sagittal reformatted images were provided.    COMPARISON:  None.    FINDINGS:  There is no lumbar vertebral body fracture.  There is grade 1 anterolisthesis of L4 on L5.  At L3/L4, there is moderate central canal narrowing secondary to the facet arthrosis and ligamentum flavum hypertrophy without significant foraminal stenosis.  At L4/L5, there is no stone significant central canal narrowing, but there is bilateral mild foraminal narrowing.  Secondary to ligamentum flavum hypertrophy and facet arthrosis.  At L5/S1, there is a broad-based disc bulge without any significant central canal stenosis or foraminal stenosis.  There is small marginal osteophytes throughout.  Vacuum phenomena is seen at L4/L5 with mild intervertebral disc space narrowing.  Incidental note is made of small bilateral pleural effusions right greater than left and gallbladder sludge or gravel-like gallstones.                                       X-Ray Chest AP Portable (Final result)  Result time 08/13/24 17:49:32      Final result by Eddie Montilla MD (08/13/24 17:49:32)                   Impression:      1. Interstitial findings may reflect edema versus chronic change, no large focal consolidation.      Electronically signed by: Eddie Montilla MD  Date:    08/13/2024  Time:    17:49               Narrative:    EXAMINATION:  XR CHEST AP PORTABLE    CLINICAL HISTORY:  Chest Pain;    TECHNIQUE:  Single frontal view of the chest was performed.    COMPARISON:  11/03/2023    FINDINGS:  The cardiomediastinal silhouette is prominent, magnified by technique, similar to the previous exam noting calcification of  the aorta..  There is no pleural effusion.  The trachea is midline.  The lungs are symmetrically expanded bilaterally with coarse interstitial attenuation in a central hilar distribution..  No large focal consolidation seen.  There is no pneumothorax.  The osseous structures are remarkable for degenerative changes..                                       X-Ray Ankle Complete Right (Final result)  Result time 08/13/24 17:50:39      Final result by Eddie Montilla MD (08/13/24 17:50:39)                   Impression:      1. Distal tibial and fibular fractures as described.      Electronically signed by: Eddie Montilla MD  Date:    08/13/2024  Time:    17:50               Narrative:    EXAMINATION:  XR ANKLE COMPLETE 3 VIEW RIGHT    CLINICAL HISTORY:  Unspecified fall, initial encounter    TECHNIQUE:  AP, lateral, and oblique images of the right ankle were performed.    COMPARISON:  Radiograph of the foot 06/01/2021    FINDINGS:  Three views right ankle.    There is osteopenia.  There is acute appearing fracture involving the distal aspect of the fibula.  There is comminuted fracture of the medial malleolus.  The ankle mortise is intact.  There is edema about the ankle.  The posterior aspect of the tibia appears intact.  There are degenerative changes of the calcaneus.  There is vascular calcification.                                       X-Ray Hip 2 or 3 views Right with Pelvis when performed (Final result)  Result time 08/13/24 17:53:13      Final result by Eddie Montilla MD (08/13/24 17:53:13)                   Impression:      1. Fractures of the right inferior and superior pubic rami.  2. There is cortical irregularity involving the right iliac wing, concerning for additional fracture.      Electronically signed by: Eddie Montilla MD  Date:    08/13/2024  Time:    17:53               Narrative:    EXAMINATION:  XR HIP WITH PELVIS WHEN PERFORMED 2 OR 3 VIEWS RIGHT    CLINICAL HISTORY:  Unspecified fall,  initial encounter    TECHNIQUE:  AP view of the pelvis and frog leg lateral view of the right hip were performed.    COMPARISON:  None    FINDINGS:  Three views right hip.    The bilateral sacroiliac joints are intact.  The pubic symphysis is intact.  There is osteopenia.  There are fractures of the right superior and inferior pubic rami.  The bilateral femoroacetabular joints are intact noting degenerative change.  There is fracture involving the right iliac wing.                                       X-Ray Knee 1 or 2 View Right (Final result)  Result time 08/13/24 17:53:54   Procedure changed from X-Ray Knee 3 View Right     Final result by Eddie Montilla MD (08/13/24 17:53:54)                   Impression:      1. No acute displaced fracture or dislocation of the knee.      Electronically signed by: Eddie Montilla MD  Date:    08/13/2024  Time:    17:53               Narrative:    EXAMINATION:  XR KNEE 1 OR 2 VIEW RIGHT    CLINICAL HISTORY:  Pain;  Unspecified fall, initial encounter    TECHNIQUE:  AP and lateral views of the right knee were performed.    COMPARISON:  11/20/2014    FINDINGS:  Two views right knee.    There is osteopenia.  There are degenerative changes of the knee.  There is vascular calcification.  No large right knee joint effusion.

## 2024-08-19 NOTE — NURSING
Nurses Note -- 4 Eyes      8/19/2024   5:11 PM      Skin assessed during: Daily Assessment      [x] No Altered Skin Integrity Present    []Prevention Measures Documented      [] Yes- Altered Skin Integrity Present or Discovered   [] LDA Added if Not in Epic (Describe Wound)   [] New Altered Skin Integrity was Present on Admit and Documented in LDA   [] Wound Image Taken    Wound Care Consulted? No    Attending Nurse:  Tish Shannon RN/Staff Member: FRANCIS Meyer

## 2024-08-19 NOTE — ASSESSMENT & PLAN NOTE
Anemia is likely due to acute blood loss which was from surgery . Most recent hemoglobin and hematocrit are listed below.  Recent Labs     08/17/24  0430 08/18/24  0548 08/19/24  0530   HGB 6.8* 10.8* 9.0*   HCT 20.9* 32.4* 28.6*       Plan  - Monitor serial CBC: Daily  - Transfuse PRBC if patient becomes hemodynamically unstable, symptomatic or H/H drops below 7/21.  - Patient has received 2 units of PRBCs on 8/17  - Patient's anemia is currently  downtrend post op  Expected with large surgery  Improved to 10 on 8/18 now after 2 U-->9

## 2024-08-19 NOTE — SUBJECTIVE & OBJECTIVE
Principal Problem:Closed right pilon fracture    Principal Orthopedic Problem: same + R both column acetabular fracture    Interval History: Pt seen and examined at bedside. Patient remains confused this morning. She is A&Ox2 and oriented to person only. Remains in restraints. Hypertensive overnight. Denies any numbness or tingling. Patient has not been sleeping well.       Review of patient's allergies indicates:   Allergen Reactions    Novolin 70/30 (semi-synthetic) Nausea And Vomiting     Severe vomiting on Relion 70/30    Sulfa (sulfonamide antibiotics) Anaphylaxis    Talwin [pentazocine lactate] Anaphylaxis    Victoza [liraglutide] Nausea And Vomiting    Glipizide Nausea Only    Citric acid     Codeine     Influenza virus vaccines Hives    Iodine and iodide containing products Hives    Levetiracetam Itching    Rituxan [rituximab] Hives    Zoloft [sertraline] Nausea And Vomiting       Current Facility-Administered Medications   Medication    0.9%  NaCl infusion (for blood administration)    0.9%  NaCl infusion (for blood administration)    acetaminophen tablet 1,000 mg    albuterol-ipratropium 2.5 mg-0.5 mg/3 mL nebulizer solution 3 mL    aluminum & magnesium hydroxide-simethicone 400-400-40 mg/5 mL suspension 30 mL    apixaban tablet 2.5 mg    bisacodyL suppository 10 mg    bisacodyL suppository 10 mg    calcium carbonate 200 mg calcium (500 mg) chewable tablet 1,000 mg    calcium carbonate 200 mg calcium (500 mg) chewable tablet 500 mg    cefTRIAXone (Rocephin) 1 g in D5W 100 mL IVPB (MB+)    dextrose 10% bolus 125 mL 125 mL    dextrose 10% bolus 125 mL 125 mL    dextrose 10% bolus 125 mL 125 mL    dextrose 10% bolus 250 mL 250 mL    dextrose 10% bolus 250 mL 250 mL    dextrose 10% bolus 250 mL 250 mL    FLUoxetine capsule 40 mg    glucagon (human recombinant) injection 1 mg    glucagon (human recombinant) injection 1 mg    glucose chewable tablet 16 g    glucose chewable tablet 16 g    glucose chewable tablet  "24 g    glucose chewable tablet 24 g    hydrOXYzine HCL tablet 25 mg    insulin aspart U-100 pen 0-10 Units    insulin aspart U-100 pen 13 Units    insulin glargine U-100 (Lantus) pen 28 Units    isosorbide mononitrate 24 hr tablet 60 mg    melatonin tablet 6 mg    methocarbamoL tablet 500 mg    metoprolol succinate (TOPROL-XL) 24 hr tablet 25 mg    morphine injection 2 mg    ondansetron disintegrating tablet 8 mg    oxyCODONE immediate release tablet 5 mg    oxyCODONE immediate release tablet Tab 10 mg    pantoprazole EC tablet 40 mg    polyethylene glycol packet 17 g    promethazine tablet 25 mg    QUEtiapine tablet 50 mg    sodium bicarbonate tablet 650 mg    sodium chloride 0.9% flush 10 mL    valproate (DEPACON) 250 mg in D5W 50 mL IVPB    vancomycin - pharmacy to dose     Objective:     Vital Signs (Most Recent):  Temp: 98.4 °F (36.9 °C) (08/19/24 0458)  Pulse: 95 (08/19/24 0458)  Resp: 18 (08/19/24 0458)  BP: (!) 173/73 (08/19/24 0458)  SpO2: 95 % (08/19/24 0458) Vital Signs (24h Range):  Temp:  [98.3 °F (36.8 °C)-99.4 °F (37.4 °C)] 98.4 °F (36.9 °C)  Pulse:  [] 95  Resp:  [16-19] 18  SpO2:  [94 %-98 %] 95 %  BP: (108-173)/(56-73) 173/73     Weight: 81.2 kg (179 lb 0.2 oz)  Height: 5' 9" (175.3 cm)  Body mass index is 26.44 kg/m².      Intake/Output Summary (Last 24 hours) at 8/19/2024 0634  Last data filed at 8/19/2024 0522  Gross per 24 hour   Intake --   Output 3550 ml   Net -3550 ml          Ortho/SPM Exam     Gen: NAD, WDWN  CV: peripherally well perfused  Resp: unlabored respirations, symmetric chest rise    MSK:  RLE:  Surgical dressings over right hip and abdomen c/d/I  Posterior slab splint in place  Fires Quad, wiggles toes  SILT  Compartments soft  Brisk cap refill  Foot is WWP     Significant Labs: CBC:   Recent Labs   Lab 08/18/24  0548 08/19/24  0530   WBC 10.48 7.12   HGB 10.8* 9.0*   HCT 32.4* 28.6*    166     CMP:   Recent Labs   Lab 08/18/24  0548   *   K 4.9    "   CO2 20*   *   BUN 42*   CREATININE 1.9*   CALCIUM 8.5*   PROT 6.1   ALBUMIN 1.9*   BILITOT 0.5   ALKPHOS 159*   AST 50*   ALT 9*   ANIONGAP 10     All pertinent labs within the past 24 hours have been reviewed.    Significant Imaging: I have reviewed all pertinent imaging results/findings.

## 2024-08-19 NOTE — ASSESSMENT & PLAN NOTE
Urine from [facility] resulted 8/16 with E. Faecalis and ecoli late so was not initially on treatment, may be cause for continued confusion and not totally herself since admit her daughter has reported, start vanc for e faecalis and rocephin for ecoli as its resistant to ampicilin so can't use that to cover both unfortunately  Plan for 5 day course given extended delirium, so katy plan until 8/20

## 2024-08-19 NOTE — PT/OT/SLP EVAL
Speech Language Pathology Evaluation  Bedside Swallow    Patient Name:  Lorena Contreras   MRN:  [id number]  Admitting Diagnosis: Closed right pilon fracture    Recommendations:                 General Recommendations:  Dysphagia therapy  Diet recommendations:  Regular Diet - IDDSI Level 7, Thin liquids - IDDSI Level 0   Aspiration Precautions: HOB to 90 degrees, Meds whole 1 at a time, and Standard aspiration precautions   General Precautions: Standard, aspiration, fall  Communication strategies:  none    Assessment:     Lorena Contreras is a 73 y.o. female with functional oropharyngeal swallow.  History:     Past Medical History:   Diagnosis Date    Allergy     Altered mental status 06/19/2022    DYSARTHRIA, SPASTIC MOVEMENTS & DIFFICULTY SWALLOWING    Anemia     Anxiety     Arthritis     Cataract     both removed    Colon polyps     Coronary artery disease     Depression     Diabetes mellitus, type II     Disorder of kidney and ureter     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/2023    Fibromyalgia     Follicular lymphoma     GERD (gastroesophageal reflux disease)     HTN (hypertension)     Hyperlipidemia     MI (myocardial infarction) 03/2019    Personal history of colonic polyps     Restless leg syndrome     Stroke        Past Surgical History:   Procedure Laterality Date    COLONOSCOPY  11/07/2012    Colon polyp found; repeat in 5 years    ELBOW SURGERY Right 2015    dislocation repair     ESOPHAGOGASTRODUODENOSCOPY  11/07/2012    atrophic gastritis, H pylori testing negative    INCISION AND DRAINAGE FOOT Right 6/2/2021    Procedure: INCISION AND DRAINAGE, FOOT, bone biopsy;  Surgeon: Quiana Penn DPM;  Location: [facility] OR;  Service: Podiatry;  Laterality: Right;    KNEE SURGERY Bilateral 2015    scoped    LEFT HEART CATHETERIZATION Left 3/29/2019    Procedure: Left heart cath;  Surgeon: Bladimir Barbosa MD;  Location: [facility] CATH LAB;  Service: Cardiology;  Laterality: Left;    LEFT HEART CATHETERIZATION Left  "11/18/2019    Procedure: Left heart cath;  Surgeon: Karl Rico MD;  Location: [facility] CATH LAB;  Service: Cardiology;  Laterality: Left;    LEFT HEART CATHETERIZATION Left 1/8/2020    Procedure: Left heart cath, right radial, noon start;  Surgeon: Christos Monreal MD;  Location: [facility] CATH LAB;  Service: Cardiology;  Laterality: Left;  RN Pre Op 1-6-20.  To be admitted 1-7-20 sor Aspirin Disensitation    OPEN REDUCTION AND INTERNAL FIXATION (ORIF) OF FRACTURE OF ACETABULUM Right 8/16/2024    Procedure: ORIF, FRACTURE, ACETABULUM;  Surgeon: Neto Davalos MD;  [facility];  Service: Orthopedics;  Laterality: Right;  anterior and lateral pelvic incisions    OPEN REDUCTION AND INTERNAL FIXATION (ORIF) OF PILON FRACTURE Right 8/14/2024    Procedure: ORIF, FRACTURE, PILON;  Surgeon: Neto Davalos MD;  [facility];  Service: Orthopedics;  Laterality: Right;    TONSILLECTOMY  1955    ULTRASOUND GUIDANCE  1/8/2020    Procedure: Ultrasound Guidance;  Surgeon: Christos Monreal MD;  Location: [facility] CATH LAB;  Service: Cardiology;;       Social History: Patient lives with alone but in same building as nephew and niece who help check on  her.    Prior Intubation HX:  intubated for surgery on 8/14    Modified Barium Swallow: none    Chest X-Rays: 8/13/24 "FINDINGS:  The cardiomediastinal silhouette is prominent, magnified by technique, similar to the previous exam noting calcification of the aorta..  There is no pleural effusion.  The trachea is midline.  The lungs are symmetrically expanded bilaterally with coarse interstitial attenuation in a central hilar distribution..  No large focal consolidation seen.  There is no pneumothorax.  The osseous structures are remarkable for degenerative changes.."    Prior diet: regular/thin.    Subjective     "My tongue is a little sore."    Pain/Comfort:  Pain Rating 1: 2/10  Location 1: tongue  Pain Rating Post-Intervention 1: " 2/10    Respiratory Status: Room air    Objective:     Oral Musculature Evaluation  Oral Musculature: WFL  Dentition: present and adequate, other (see comments) (missing some upper dentition)  Secretion Management: adequate  Mandibular Strength and Mobility: WFL  Oral Labial Strength and Mobility: WFL  Lingual Strength and Mobility: WFL  Buccal Strength and Mobility: WFL  Volitional Swallow: WFL  Voice Prior to PO Intake: clear    Bedside Swallow Eval:   Consistencies Assessed:  Thin liquids consecutive via straw  Puree pudding  Solids cracker      Oral Phase:   WFL  Pt reports some soreness on right side of tongue but has improved. Did not interfere with oral phase of swallow.    Pharyngeal Phase:   no overt clinical signs/symptoms of aspiration  no overt clinical signs/symptoms of pharyngeal dysphagia    Compensatory Strategies  None    Treatment: Pt sitting upright in bed with daughter present. Pt with complaints of sore throat since surgery but has improved. Pt's vocal quality WFL. Pt self-administered trials. No overt s/s of aspiration appreciated. Vocal quality remained clear. Pt reports improvement in both sore throat and right side of tongue. No concerns regarding swallowing at this time. Diet should be checked x1 for any concerns. SLP provided education regarding role of SLP, aspiration precautions, different food textures/temperatures that may feel better, and overall impressions and recommendations. Pt and pt's daughter expressed understanding.     Goals:   Multidisciplinary Problems       SLP Goals          Problem: SLP    Goal Priority Disciplines Outcome   SLP Goal     SLP Progressing   Description: Speech Language Pathology Goals  Goals expected to be met by 9/2  1. Pt will tolerate regular and thin liquid diet with no concerns of aspiration                             Plan:     Patient to be seen:  3 x/week   Plan of Care expires:  09/16/24  Plan of Care reviewed with:  patient, daughter   SLP  Follow-Up:  Yes       Discharge recommendations:   (tbd/likely no needs)   Barriers to Discharge:  None    Time Tracking:     SLP Treatment Date:   08/19/24  Speech Start Time:  1159  Speech Stop Time:  1214     Speech Total Time (min):  15 min    Billable Minutes: Eval Swallow and Oral Function 7 and Self Care/Home Management Training 8    08/19/2024

## 2024-08-19 NOTE — PT/OT/SLP PROGRESS
"Occupational Therapy   Co-Treatment    Name: Lorena Contreras  MRN: [id number]  Admitting Diagnosis:  Closed right pilon fracture  3 Days Post-Op    Recommendations:     Discharge Recommendations: High Intensity Therapy  Discharge Equipment Recommendations:  wheelchair  Barriers to discharge:  Decreased caregiver support    Assessment:     Lorena Contreras is a 73 y.o. female with a medical diagnosis of Closed right pilon fracture.  She presents with R pilon fx. Performance deficits affecting function are impaired self care skills, impaired functional mobility, weakness, impaired endurance, decreased upper extremity function, orthopedic precautions. Pt was Ox1- person only and was able to perform supine/sit and sit/stand T/F c max A-total x2.  She was unable to stand on her L LE and she reports having had multiple sx's on her L LE PTA.   Able to perform toilet hygiene c total assist at bed level and UB dressing c min A.  Pt is progressing and was not agitated during tx session.    Rehab Prognosis:  Good; patient would benefit from acute skilled OT services to address these deficits and reach maximum level of function.       Plan:     Patient to be seen 4 x/week to address the above listed problems via self-care/home management, therapeutic activities, therapeutic exercises  Plan of Care Expires: 09/15/24  Plan of Care Reviewed with: patient, daughter    Subjective     Chief Complaint: R Pilon fx and R acetablum fx and is NWB R LE.  Patient/Family Comments/goals: "I need to go to the bathroom."  Pain/Comfort:  Pain Rating 1:  (Pt did not rate)    Objective:     Communicated with: RN prior to session.  Patient found supine with chong catheter, SCD, telemetry upon OT entry to room.    General Precautions: Standard, fall    Orthopedic Precautions:RLE non weight bearing  Braces:  (Cast R LE)  Respiratory Status: Room air     Occupational Performance:     Bed Mobility:    Patient completed Rolling/Turning to Left " with  minimum assistance  Patient completed Rolling/Turning to Right with maximal assistance  Patient completed Scooting/Bridging with maximal assistance  Patient completed Supine to Sit with maximal assistance and 2 persons  Patient completed Sit to Supine with maximal assistance and 2 persons     Functional Mobility/Transfers:  Patient completed Sit <> Stand Transfer with total assistance and of 2 persons  with  rolling walker   Functional Mobility: Pt was unable to stand on her L LE and had max difficulty extending her L knee to stand.      Activities of Daily Living:  Upper Body Dressing: minimum assistance to don/[facility] gown.  Toileting: total assistance for toilet hygiene at bed level.      [facility] 6 Click ADL: 13    Patient left supine with all lines intact, call button in reach, RN notified, and daughter present    GOALS:   Multidisciplinary Problems       Occupational Therapy Goals          Problem: Occupational Therapy    Goal Priority Disciplines Outcome Interventions   Occupational Therapy Goal     OT, PT/OT Progressing    Description: Goals to be met by: 9/15/2024     Patient will increase functional independence with ADLs by performing:    UE Dressing with Supervision.  LE Dressing with Minimal Assistance.  Grooming while standing at sink with Minimal Assistance.  Toileting from bedside commode with Moderate Assistance for hygiene and clothing management.   Step transfer with Moderate Assistance  Toilet transfer to bedside commode with Minimal Assistance.                         Time Tracking:     OT Date of Treatment: 08/19/24  OT Start Time: 0955  OT Stop Time: 1020  OT Total Time (min): 25 min    Billable Minutes:Self Care/Home Management 25               8/19/2024

## 2024-08-19 NOTE — ANESTHESIA POSTPROCEDURE EVALUATION
Anesthesia Post Evaluation    Patient: Lorena Contreras    Procedure(s) Performed: Procedure(s) (LRB):  ORIF, FRACTURE, ACETABULUM (Right)    Final Anesthesia Type: general      Patient location during evaluation: floor  Patient participation: No - Unable to Participate, Other Reason (see comments) (pt confused and anxious)  Level of consciousness: confused and responds to stimulation  Post-procedure vital signs: reviewed and stable  Pain management: adequate  Airway patency: patent    PONV status at discharge: No PONV  Anesthetic complications: no      Cardiovascular status: blood pressure returned to baseline and hemodynamically stable  Respiratory status: unassisted and spontaneous ventilation  Hydration status: euvolemic  Follow-up not needed.  Comments: Psych consulted for delirium              Vitals Value Taken Time   /59 08/19/24 1539   Temp 35.8 °C (96.4 °F) 08/19/24 1539   Pulse 89 08/19/24 1539   Resp 18 08/19/24 1539   SpO2 94 % 08/19/24 1539         Event Time   Out of Recovery 08/16/2024 16:45:00         Pain/Bharathi Score: Pain Rating Prior to Med Admin: 1 (8/19/2024  2:12 PM)  Pain Rating Post Med Admin: 0 (8/18/2024 10:29 PM)

## 2024-08-19 NOTE — PROGRESS NOTES
Pharmacokinetic Assessment Follow Up: IV Vancomycin    Vancomycin serum concentration assessment(s):    The random level was drawn correctly and can be used to guide therapy at this time. The measurement is within the desired definitive target range of 10 to 20 mcg/mL.  - The random level was drawn ~12 hours after previous level and ~36 hours after previous dose. It resulted at 18.6.  - Two-point kinetics indicate a vancomycin clearance half-life of ~74 hours.    Vancomycin Regimen Plan:    Will re-dose once now with vancomycin 500 mg.  - Re-dose when the random level is less than 20 mcg/mL, next level to be drawn at 0430 with AM labs on 8/20.  - Ms. Contreras has an KYA on CKD3a that is improving overall.   - Will continue pulse dosing in the setting of KYA.    Drug levels (last 3 results):  Recent Labs   Lab Result Units 08/18/24  1051 08/18/24  2231   Vancomycin, Random ug/mL 20.8 18.6       Pharmacy will continue to follow and monitor vancomycin.    Please contact pharmacy at extension [phone] for questions regarding this assessment.    Thank you for the consult,   Kathia Lopez       Patient brief summary:  Lorena Contreras is a 73 y.o. female initiated on antimicrobial therapy with IV Vancomycin for treatment of urinary tract infection    The patient's current regimen is pulse dosing in the setting of KYA    Actual Body Weight:   81.2 kg    Renal Function:   Estimated Creatinine Clearance: 30.1 mL/min (A) (based on SCr of 1.9 mg/dL (H)).     Dialysis Method (if applicable):  N/A

## 2024-08-19 NOTE — ASSESSMENT & PLAN NOTE
KYA is likely due to pre-renal azotemia due to intravascular volume depletion. Baseline creatinine is  1.6 to 1.8 . Most recent creatinine and eGFR are listed below.  Recent Labs     08/17/24  0430 08/18/24  0548 08/19/24  0530   CREATININE 1.7* 1.9* 1.7*   EGFRNORACEVR 31.5* 27.5* 31.5*        Plan  - KYA is improving Cr 1.7 today and in baseine ranges after IVF down from 2.4, slightly back up 8/18 at 1.9, and monitor, oral intake starting to improve a little-- 1.7  - Avoid nephrotoxins and renally dose meds for GFR listed above  - Monitor urine output, serial BMP, and adjust therapy as needed    -

## 2024-08-19 NOTE — PROGRESS NOTES
"CONSULTATION LIAISON PSYCHIATRY PROGRESS NOTE    Patient Name: Lorena Contreras  MRN: [id number]  Patient Class: IP- Inpatient  Admission Date: 8/13/2024  Attending Physician: Rupal Ochoa MD      SUBJECTIVE:   Lorena Contreras is a 73 y.o. female with past psychiatric history of anxiety & past pertinent medical history of fibromyalgia, HTN, HLD, CAD, MI, CVA, and T2DM presents to the ED/admitted to the hospital for Closed right pilon fracture after a fall     Psychiatry consulted for "Hyperactive delirium with high anxiety baseline with hallucinations in restraints no sleep >48 hours, failing prns, uti, recs for other PrN management in interim for assistance to avoid adverse events"     Today, pt sitting up in bed. Daughter at bedside (steps out for privacy during interview). Pt is calm and cooperative during interview. Alert and oriented x4 this AM. No AVH noted during exam but pt states "they are still there." She is unable to elaborate at this time but does state the hallucinations were worse after she was given gabapentin because "I am highly allergic to that." Pt still reports not sleeping. Reports her mood is "not good" because of her situation. She also states her mood as been decreased at home as well, but it is worse now that she is in the hospital. Denies current or past SI/HI.       OBJECTIVE:    Mental Status Exam:  General Appearance: appears stated age, well developed and nourished, adequately groomed and appropriately dressed, in no acute distress  Behavior: normal; cooperative; reasonably friendly, pleasant, and polite; appropriate eye-contact; under good behavioral control  Involuntary Movements and Motor Activity: no abnormal involuntary movements noted; no tics, no tremors, no akathisia, no dystonia, no evidence of tardive dyskinesia; no psychomotor agitation or retardation  Gait and Station: unable to assess - patient lying down or seated  Speech and Language: intact; normal rate, " "rhythm, volume, tone, and pitch; conversational, spontaneous, and coherent; speaks and understands English proficiently and fluently; repeats words and phrases, no word finding difficulties are noted  Mood: "not good"  Affect: anxious  Thought Process and Associations: intact; linear, goal-directed, organized, and logical; no loosening of associations noted  Thought Content and Perceptions:: no suicidal ideation, + visual hallucinations  Sensorium and Orientation: intact; alert with clear sensorium; oriented fully to person, place, time and situation  Recent and Remote Memory: grossly intact, able to recall relevant and salient information from the recent and remote past  Attention and Concentration: grossly intact, attentive to the conversation and not readily distractible  Fund of Knowledge: grossly intact, used appropriate vocabulary and demonstrated an awareness of current events, consistent with educational level achieved  Insight: limited/partial awareness of illness  Judgment: impaired    CAM ICU positive? no      ASSESSMENT & RECOMMENDATIONS   Delirium due to another medical condition  Hx of anxiety      PSYCH MEDICATIONS  Continue Depacon 250 mg IV TID  Continue Seroquel 50 mg qhs   Schedule Melatonin 6 mg qhs   QTc 8/13: 504 ms. F/U repeat ECG   If no improvement in AMS, recommend pursuing encephalopathic work up w/ vitamin levels, TSH, infectious etiologies, etc       DELIRIUM  DELIRIUM BEHAVIOR MANAGEMENT  PLEASE utilize CHEMICAL restraints with PRN meds first for agitation. Minimize use of PHYSICAL restraints OR have periods of being out of physical restraints if possible.  Keep window shades open and room lit during day and room dim at night in order to promote normal sleep-wake cycles  Encourage family at bedside. Twisp patient often to situation, location, date.  Continue to Limit or Discontinue use of Narcotics, Benzos and Anti-cholinergic medications as they may worsen delirium.  Continue medical " workup for causative etiology of Delirium.      OTHER PERTINENT DIAGNOSIS  UTI, UCx growing e. coli and e. faecalis, currently on antibiotics     Pelvis and right ankle fractures, managed surgically w/ daily opioids for pain control        RISK ASSESSMENT  NO NEED FOR PEC patient NOT in any imminent danger of hurting self or others and not gravely disabled.      FOLLOW UP  Will follow up while in house     DISPOSITION - once medically cleared:    Defer to medical team       Please contact ON CALL psychiatry service (24/7) for any acute issues that may arise.    ASHLEY Buckner   Psychiatry  [facility]  8/19/2024 11:01 AM        --------------------------------------------------------------------------------------------------------------------------------------------------------------------------------------------------------------------------------------    CONTINUED OBJECTIVE clinical data & findings reviewed and noted for above decision making    Current Medications:   Scheduled Meds:    acetaminophen  1,000 mg Oral Q8H    aluminum & magnesium hydroxide-simethicone  30 mL Oral Q6H    apixaban  2.5 mg Oral BID    bisacodyL  10 mg Rectal Once    calcium carbonate  1,000 mg Oral Daily    cefTRIAXone (Rocephin) IV (PEDS and ADULTS)  1 g Intravenous Q24H    FLUoxetine  40 mg Oral Daily    insulin aspart U-100  9 Units Subcutaneous TIDWM    insulin glargine U-100  28 Units Subcutaneous QHS    isosorbide mononitrate  60 mg Oral Daily    melatonin  6 mg Oral Nightly    methocarbamoL  500 mg Oral QID    metoprolol succinate  25 mg Oral Daily    pantoprazole  40 mg Oral Daily    QUEtiapine  50 mg Oral QHS    sodium bicarbonate  650 mg Oral BID    valproate sodium (DEPACON) IVPB  250 mg Intravenous Q8H     PRN Meds:   Current Facility-Administered Medications:     0.9%  NaCl infusion (for blood administration), , Intravenous, Q24H PRN    0.9%  NaCl infusion (for blood administration), ,  Intravenous, Q24H PRN    albuterol-ipratropium, 3 mL, Nebulization, Q4H PRN    bisacodyL, 10 mg, Rectal, Daily PRN    calcium carbonate, 500 mg, Oral, TID PRN    dextrose 10%, 12.5 g, Intravenous, PRN    dextrose 10%, 12.5 g, Intravenous, PRN    dextrose 10%, 12.5 g, Intravenous, PRN    dextrose 10%, 25 g, Intravenous, PRN    dextrose 10%, 25 g, Intravenous, PRN    dextrose 10%, 25 g, Intravenous, PRN    glucagon (human recombinant), 1 mg, Intramuscular, PRN    glucagon (human recombinant), 1 mg, Intramuscular, PRN    glucose, 16 g, Oral, PRN    glucose, 16 g, Oral, PRN    glucose, 24 g, Oral, PRN    glucose, 24 g, Oral, PRN    hydrOXYzine HCL, 25 mg, Oral, TID PRN    insulin aspart U-100, 0-10 Units, Subcutaneous, QID (AC + HS) PRN    morphine, 2 mg, Intravenous, Q6H PRN    ondansetron, 8 mg, Oral, Q8H PRN    oxyCODONE, 5 mg, Oral, Q6H PRN    oxyCODONE, 10 mg, Oral, Q6H PRN    polyethylene glycol, 17 g, Oral, Daily PRN    promethazine, 25 mg, Oral, Q6H PRN    sodium chloride 0.9%, 10 mL, Intravenous, PRN    Pharmacy to dose Vancomycin consult, , , Once **AND** vancomycin - pharmacy to dose, , Intravenous, pharmacy to manage frequency    Allergies:   Review of patient's allergies indicates:   Allergen Reactions    Novolin 70/30 (semi-synthetic) Nausea And Vomiting     Severe vomiting on Relion 70/30    Sulfa (sulfonamide antibiotics) Anaphylaxis    Talwin [pentazocine lactate] Anaphylaxis    Victoza [liraglutide] Nausea And Vomiting    Glipizide Nausea Only    Citric acid     Codeine     Influenza virus vaccines Hives    Iodine and iodide containing products Hives    Levetiracetam Itching    Rituxan [rituximab] Hives    Zoloft [sertraline] Nausea And Vomiting       Vitals  Vitals:    08/19/24 0751   BP: (!) 157/70   Pulse: 91   Resp: 18   Temp: 96.7 °F (35.9 °C)       Labs/Imaging/Studies:  Recent Results (from the past 24 hour(s))   POCT glucose    Collection Time: 08/18/24 12:03 PM   Result Value Ref Range    POCT  Glucose 281 (H) 70 - 110 mg/dL   POCT glucose    Collection Time: 08/18/24  4:14 PM   Result Value Ref Range    POCT Glucose 107 70 - 110 mg/dL   POCT glucose    Collection Time: 08/18/24  8:25 PM   Result Value Ref Range    POCT Glucose 136 (H) 70 - 110 mg/dL   Vancomycin, random    Collection Time: 08/18/24 10:31 PM   Result Value Ref Range    Vancomycin, Random 18.6 Not established ug/mL   CBC Auto Differential    Collection Time: 08/19/24  5:30 AM   Result Value Ref Range    WBC 7.12 3.90 - 12.70 K/uL    RBC 3.20 (L) 4.00 - 5.40 M/uL    Hemoglobin 9.0 (L) 12.0 - 16.0 g/dL    Hematocrit 28.6 (L) 37.0 - 48.5 %    MCV 89 82 - 98 fL    MCH 28.1 27.0 - 31.0 pg    MCHC 31.5 (L) 32.0 - 36.0 g/dL    RDW 15.9 (H) 11.5 - 14.5 %    Platelets 166 150 - 450 K/uL    MPV 12.6 9.2 - 12.9 fL    Immature Granulocytes 0.7 (H) 0.0 - 0.5 %    Gran # (ANC) 5.0 1.8 - 7.7 K/uL    Immature Grans (Abs) 0.05 (H) 0.00 - 0.04 K/uL    Lymph # 1.1 1.0 - 4.8 K/uL    Mono # 0.7 0.3 - 1.0 K/uL    Eos # 0.2 0.0 - 0.5 K/uL    Baso # 0.04 0.00 - 0.20 K/uL    nRBC 0 0 /100 WBC    Gran % 70.5 38.0 - 73.0 %    Lymph % 16.0 (L) 18.0 - 48.0 %    Mono % 9.8 4.0 - 15.0 %    Eosinophil % 2.4 0.0 - 8.0 %    Basophil % 0.6 0.0 - 1.9 %    Differential Method Automated    Comprehensive Metabolic Panel    Collection Time: 08/19/24  5:30 AM   Result Value Ref Range    Sodium 137 136 - 145 mmol/L    Potassium 4.1 3.5 - 5.1 mmol/L    Chloride 109 95 - 110 mmol/L    CO2 21 (L) 23 - 29 mmol/L    Glucose 129 (H) 70 - 110 mg/dL    BUN 40 (H) 8 - 23 mg/dL    Creatinine 1.7 (H) 0.5 - 1.4 mg/dL    Calcium 7.9 (L) 8.7 - 10.5 mg/dL    Total Protein 5.0 (L) 6.0 - 8.4 g/dL    Albumin 1.5 (L) 3.5 - 5.2 g/dL    Total Bilirubin 0.3 0.1 - 1.0 mg/dL    Alkaline Phosphatase 132 55 - 135 U/L    AST 42 (H) 10 - 40 U/L    ALT 9 (L) 10 - 44 U/L    eGFR 31.5 (A) >60 mL/min/1.73 m^2    Anion Gap 7 (L) 8 - 16 mmol/L   Phosphorus    Collection Time: 08/19/24  5:30 AM   Result Value Ref  Range    Phosphorus 3.4 2.7 - 4.5 mg/dL   Magnesium    Collection Time: 08/19/24  5:30 AM   Result Value Ref Range    Magnesium 1.8 1.6 - 2.6 mg/dL   Type & Screen    Collection Time: 08/19/24  5:30 AM   Result Value Ref Range    Group & Rh O POS     Indirect Benjam[place] NEG     Specimen Outdate 08/22/2024 23:59    POCT glucose    Collection Time: 08/19/24  7:47 AM   Result Value Ref Range    POCT Glucose 118 (H) 70 - 110 mg/dL     Imaging Results              CT Ankle (Including Hindfoot) Without Contrast Right (Final result)  Result time 08/14/24 04:49:46      Final result by Portillo Oquendo MD (08/14/24 04:49:46)                   Impression:      Near anatomic alignment of complex mildly displaced distal tibia fracture and mildly displaced distal fibular fracture.    Electronically signed by resident: Hira Patel  Date:    08/14/2024  Time:    03:56    Electronically signed by: Portillo Oquendo MD  Date:    08/14/2024  Time:    04:49               Narrative:    EXAMINATION:  CT ANKLE (INCLUDING HINDFOOT) WITHOUT CONTRAST RIGHT; CT 3D RENDERING WO INDEPENDENT WORKSTATION    CLINICAL HISTORY:  Fracture, ankle;; Fracture, ankle;per order request;    TECHNIQUE:  Axial 0.625-mm images of the right ankle obtained without intravenous contrast.  Data submitted for coronal and sagittal reformats.  3D reconstructed images were acquired by post processing on an independent workstation with concurrent supervision and archived for permanent record. 3D imaging of the right ankle was obtained for further evaluation and operative planning if needed.    COMPARISON:  Ankle radiograph 03/14/2024.    FINDINGS:  Bones are demineralized.  There is a cast in place.  There is mildly displaced comminuted distal tibial fracture and a minimally displaced distal fibular fracture with medial angulation of the fracture fragment.  Intra-articular extension fracture fragments which are mildly displaced involving the distal tibia near  anatomic alignment of fracture fragments.  Fractures involve the posterior and medial malleoli.  No dislocation.  There are a few foci of subcutaneous gas overlying the tibial fracture, possibly relating to trauma or reduction procedure though correlation is advised.  Dense calcific tendinosis of the posterior tibial tendon.  No full-thickness injury of the Achilles tendon.  Soft tissue edema about the ankle.  Prominent vascular calcifications noted.                                       CT 3D Rendering WO Independent Workstation (Final result)  Result time 08/14/24 04:49:46      Final result by Portillo Oquendo MD (08/14/24 04:49:46)                   Impression:      Near anatomic alignment of complex mildly displaced distal tibia fracture and mildly displaced distal fibular fracture.    Electronically signed by resident: Hira Patel  Date:    08/14/2024  Time:    03:56    Electronically signed by: Portillo Oquendo MD  Date:    08/14/2024  Time:    04:49               Narrative:    EXAMINATION:  CT ANKLE (INCLUDING HINDFOOT) WITHOUT CONTRAST RIGHT; CT 3D RENDERING WO INDEPENDENT WORKSTATION    CLINICAL HISTORY:  Fracture, ankle;; Fracture, ankle;per order request;    TECHNIQUE:  Axial 0.625-mm images of the right ankle obtained without intravenous contrast.  Data submitted for coronal and sagittal reformats.  3D reconstructed images were acquired by post processing on an independent workstation with concurrent supervision and archived for permanent record. 3D imaging of the right ankle was obtained for further evaluation and operative planning if needed.    COMPARISON:  Ankle radiograph 03/14/2024.    FINDINGS:  Bones are demineralized.  There is a cast in place.  There is mildly displaced comminuted distal tibial fracture and a minimally displaced distal fibular fracture with medial angulation of the fracture fragment.  Intra-articular extension fracture fragments which are mildly displaced involving the  distal tibia near anatomic alignment of fracture fragments.  Fractures involve the posterior and medial malleoli.  No dislocation.  There are a few foci of subcutaneous gas overlying the tibial fracture, possibly relating to trauma or reduction procedure though correlation is advised.  Dense calcific tendinosis of the posterior tibial tendon.  No full-thickness injury of the Achilles tendon.  Soft tissue edema about the ankle.  Prominent vascular calcifications noted.                                       X-Ray Ankle Complete Right (Final result)  Result time 08/14/24 03:44:09      Final result by Portillo Oquendo MD (08/14/24 03:44:09)                   Impression:      Similar alignment of distal tibia and distal fibula fractures post reduction.      Electronically signed by: Portillo Oquendo MD  Date:    08/14/2024  Time:    03:44               Narrative:    EXAMINATION:  XR ANKLE COMPLETE 3 VIEW RIGHT    CLINICAL HISTORY:  Post reduction;    TECHNIQUE:  AP, lateral, and oblique images of the right ankle were performed.    COMPARISON:  08/13/2024.    FINDINGS:  Exam quality is limited by suboptimal positioning and overlapping cast material.  Acute fractures of the distal tibia and distal fibula as seen previously.  Bones are demineralized.  No gross dislocation or significant worsening alignment of fracture fragments.  Soft tissue edema.                                       CT Pelvis Without Contrast (Final result)  Result time 08/13/24 20:58:12      Final result by Quiana Chawla MD (08/13/24 20:58:12)                   Impression:      Acute traumatic fractures involving the right iliac wing, the anterior pillar of the right acetabulum and the lateral most aspect of the right superior ramus, and the right inferior pubic ramus.  No hip dislocation.      Electronically signed by: Quiana Chawla  Date:    08/13/2024  Time:    20:58               Narrative:    EXAMINATION:  CT PELVIS WITHOUT  CONTRAST    CLINICAL HISTORY:  Pelvic trauma;    TECHNIQUE:  1.25 mm axial images were obtained through the pelvis from above the iliac crest through the upper femoral shafts.    COMPARISON:  Plain hip radiograph 08/13/2020 full    FINDINGS:  There is a minimally displaced fracture of the right iliac wing.  There are comminuted fractures involving the anterior pillar of the right acetabulum and the lateral most aspect of the right superior ramus.  There is comminuted and overlapping fracture of the right inferior pubic ramus.  The hips are not dislocated.  The SI joints are intact.  There are degenerative changes seen at the sacroiliac joints and the pubic symphysis.  Bones are osteopenic.  Incidental note is made of vascular calcifications and a small fat containing umbilical hernia.                                       CT Lumbar Spine Without Contrast (Final result)  Result time 08/13/24 20:31:47      Final result by Quiana Chawla MD (08/13/24 20:31:47)                   Impression:      No acute lumbar fracture.  Multilevel degenerative change greatest at L4/L5.    Small bilateral pleural effusions right greater than left.    Gallbladder sludge or gravel-like gallstones.      Electronically signed by: Quiana Chawla  Date:    08/13/2024  Time:    20:31               Narrative:    EXAMINATION:  CT OF THE LUMBAR SPINE WITHOUT    CLINICAL HISTORY:  Complaining of right hip pain secondary to fall from standing.    TECHNIQUE:  1.25 mm axial images were obtained through the lumbar spine. Coronal and sagittal reformatted images were provided.    COMPARISON:  None.    FINDINGS:  There is no lumbar vertebral body fracture.  There is grade 1 anterolisthesis of L4 on L5.  At L3/L4, there is moderate central canal narrowing secondary to the facet arthrosis and ligamentum flavum hypertrophy without significant foraminal stenosis.  At L4/L5, there is no stone significant central canal narrowing, but there is bilateral  mild foraminal narrowing.  Secondary to ligamentum flavum hypertrophy and facet arthrosis.  At L5/S1, there is a broad-based disc bulge without any significant central canal stenosis or foraminal stenosis.  There is small marginal osteophytes throughout.  Vacuum phenomena is seen at L4/L5 with mild intervertebral disc space narrowing.  Incidental note is made of small bilateral pleural effusions right greater than left and gallbladder sludge or gravel-like gallstones.                                       X-Ray Chest AP Portable (Final result)  Result time 08/13/24 17:49:32      Final result by Eddie Montilla MD (08/13/24 17:49:32)                   Impression:      1. Interstitial findings may reflect edema versus chronic change, no large focal consolidation.      Electronically signed by: Eddie Montilla MD  Date:    08/13/2024  Time:    17:49               Narrative:    EXAMINATION:  XR CHEST AP PORTABLE    CLINICAL HISTORY:  Chest Pain;    TECHNIQUE:  Single frontal view of the chest was performed.    COMPARISON:  11/03/2023    FINDINGS:  The cardiomediastinal silhouette is prominent, magnified by technique, similar to the previous exam noting calcification of the aorta..  There is no pleural effusion.  The trachea is midline.  The lungs are symmetrically expanded bilaterally with coarse interstitial attenuation in a central hilar distribution..  No large focal consolidation seen.  There is no pneumothorax.  The osseous structures are remarkable for degenerative changes..                                       X-Ray Ankle Complete Right (Final result)  Result time 08/13/24 17:50:39      Final result by Eddie Montilla MD (08/13/24 17:50:39)                   Impression:      1. Distal tibial and fibular fractures as described.      Electronically signed by: Eddie Montilla MD  Date:    08/13/2024  Time:    17:50               Narrative:    EXAMINATION:  XR ANKLE COMPLETE 3 VIEW RIGHT    CLINICAL  HISTORY:  Unspecified fall, initial encounter    TECHNIQUE:  AP, lateral, and oblique images of the right ankle were performed.    COMPARISON:  Radiograph of the foot 06/01/2021    FINDINGS:  Three views right ankle.    There is osteopenia.  There is acute appearing fracture involving the distal aspect of the fibula.  There is comminuted fracture of the medial malleolus.  The ankle mortise is intact.  There is edema about the ankle.  The posterior aspect of the tibia appears intact.  There are degenerative changes of the calcaneus.  There is vascular calcification.                                       X-Ray Hip 2 or 3 views Right with Pelvis when performed (Final result)  Result time 08/13/24 17:53:13      Final result by Eddie Montilla MD (08/13/24 17:53:13)                   Impression:      1. Fractures of the right inferior and superior pubic rami.  2. There is cortical irregularity involving the right iliac wing, concerning for additional fracture.      Electronically signed by: Eddie Montilla MD  Date:    08/13/2024  Time:    17:53               Narrative:    EXAMINATION:  XR HIP WITH PELVIS WHEN PERFORMED 2 OR 3 VIEWS RIGHT    CLINICAL HISTORY:  Unspecified fall, initial encounter    TECHNIQUE:  AP view of the pelvis and frog leg lateral view of the right hip were performed.    COMPARISON:  None    FINDINGS:  Three views right hip.    The bilateral sacroiliac joints are intact.  The pubic symphysis is intact.  There is osteopenia.  There are fractures of the right superior and inferior pubic rami.  The bilateral femoroacetabular joints are intact noting degenerative change.  There is fracture involving the right iliac wing.                                       X-Ray Knee 1 or 2 View Right (Final result)  Result time 08/13/24 17:53:54   Procedure changed from X-Ray Knee 3 View Right     Final result by Eddie Montilla MD (08/13/24 17:53:54)                   Impression:      1. No acute displaced  fracture or dislocation of the knee.      Electronically signed by: Eddie Montilla MD  Date:    08/13/2024  Time:    17:53               Narrative:    EXAMINATION:  XR KNEE 1 OR 2 VIEW RIGHT    CLINICAL HISTORY:  Pain;  Unspecified fall, initial encounter    TECHNIQUE:  AP and lateral views of the right knee were performed.    COMPARISON:  11/20/2014    FINDINGS:  Two views right knee.    There is osteopenia.  There are degenerative changes of the knee.  There is vascular calcification.  No large right knee joint effusion.

## 2024-08-19 NOTE — ANESTHESIA POSTPROCEDURE EVALUATION
Anesthesia Post Evaluation    Patient: Lorena Contreras    Procedure(s) Performed: Procedure(s) (LRB):  ORIF, FRACTURE, PILON (Right)    Final Anesthesia Type: general      Patient location during evaluation: PACU  Patient participation: Yes- Able to Participate  Level of consciousness: awake and alert  Post-procedure vital signs: reviewed and stable  Pain management: adequate  Airway patency: patent    PONV status at discharge: No PONV  Anesthetic complications: no      Cardiovascular status: blood pressure returned to baseline  Respiratory status: unassisted  Hydration status: euvolemic  Follow-up not needed.              Vitals Value Taken Time   /73 08/19/24 0458   Temp 36.9 °C (98.4 °F) 08/19/24 0458   Pulse 95 08/19/24 0458   Resp 18 08/19/24 0458   SpO2 95 % 08/19/24 0458         Event Time   Out of Recovery 08/14/2024 15:15:00         Pain/Bharathi Score: Pain Rating Prior to Med Admin: 3 (8/19/2024  6:03 AM)  Pain Rating Post Med Admin: 0 (8/18/2024 10:29 PM)

## 2024-08-19 NOTE — ASSESSMENT & PLAN NOTE
Closed fracture of right iliac wing  Fracture of acetabulum  - AF, VSS  - Xray Hip showed fractures of the right inferior and superior pubic rami   - CT Pelvis showed acute traumatic fractures involving the right iliac wing, the anterior pillar of the right acetabulum and the lateral most aspect of the right superior ramus, and the right inferior pubic ramus   - Ortho consulted- and OR 8/16 with jade with Open reduction internal fixation right both column acetabulum fracture . Multimodal plan control, eliquis started post op for dvt ppx- ortho recs for 10 weeks post op- October 26th end date  -10 weeks NWB to RLE given ankle fx also present now  -PT/OT, plan for SNF, reports some pain 8/18

## 2024-08-19 NOTE — ASSESSMENT & PLAN NOTE
- hx noted  - continue home fluoxetine   High anxiety at baseline per her and daugher, doctors have been hesitant to do acute anxiety meds due to fall risk they said, was not on on admit, high anxiety 8/15, will try ativan x 1 as suspect having possible panic attack after therapy sesion based on nusring description and did better after this  Very anxious in pacu and anesthesia bedside, giving precedex x 1 to relex,and required zyprexa on 8/16 PM and restraints to calm down as confused and anxious.  Will give ativan once as showing signs 8/17 of panic with chest pressure and tingling.  Now more delirious in later 8/17 pm and 8/18 worsening, talking to wall/etc. See delirium  Improved now 8/19, near baseilne now

## 2024-08-20 LAB
ALBUMIN SERPL BCP-MCNC: 1.4 G/DL (ref 3.5–5.2)
ALP SERPL-CCNC: 140 U/L (ref 55–135)
ALT SERPL W/O P-5'-P-CCNC: 15 U/L (ref 10–44)
ANION GAP SERPL CALC-SCNC: 6 MMOL/L (ref 8–16)
AST SERPL-CCNC: 45 U/L (ref 10–40)
BASOPHILS # BLD AUTO: 0.05 K/UL (ref 0–0.2)
BASOPHILS NFR BLD: 0.6 % (ref 0–1.9)
BILIRUB SERPL-MCNC: 0.3 MG/DL (ref 0.1–1)
BUN SERPL-MCNC: 40 MG/DL (ref 8–23)
CALCIUM SERPL-MCNC: 8 MG/DL (ref 8.7–10.5)
CHLORIDE SERPL-SCNC: 108 MMOL/L (ref 95–110)
CO2 SERPL-SCNC: 23 MMOL/L (ref 23–29)
CREAT SERPL-MCNC: 1.7 MG/DL (ref 0.5–1.4)
DIFFERENTIAL METHOD BLD: ABNORMAL
EOSINOPHIL # BLD AUTO: 0.3 K/UL (ref 0–0.5)
EOSINOPHIL NFR BLD: 4.2 % (ref 0–8)
ERYTHROCYTE [DISTWIDTH] IN BLOOD BY AUTOMATED COUNT: 16 % (ref 11.5–14.5)
EST. GFR  (NO RACE VARIABLE): 31.5 ML/MIN/1.73 M^2
GLUCOSE SERPL-MCNC: 64 MG/DL (ref 70–110)
HCT VFR BLD AUTO: 29.4 % (ref 37–48.5)
HGB BLD-MCNC: 9.4 G/DL (ref 12–16)
IMM GRANULOCYTES # BLD AUTO: 0.05 K/UL (ref 0–0.04)
IMM GRANULOCYTES NFR BLD AUTO: 0.6 % (ref 0–0.5)
LYMPHOCYTES # BLD AUTO: 1.7 K/UL (ref 1–4.8)
LYMPHOCYTES NFR BLD: 20.8 % (ref 18–48)
MAGNESIUM SERPL-MCNC: 2.1 MG/DL (ref 1.6–2.6)
MCH RBC QN AUTO: 28.8 PG (ref 27–31)
MCHC RBC AUTO-ENTMCNC: 32 G/DL (ref 32–36)
MCV RBC AUTO: 90 FL (ref 82–98)
MONOCYTES # BLD AUTO: 0.7 K/UL (ref 0.3–1)
MONOCYTES NFR BLD: 9.1 % (ref 4–15)
NEUTROPHILS # BLD AUTO: 5.1 K/UL (ref 1.8–7.7)
NEUTROPHILS NFR BLD: 64.7 % (ref 38–73)
NRBC BLD-RTO: 0 /100 WBC
PHOSPHATE SERPL-MCNC: 3.9 MG/DL (ref 2.7–4.5)
PLATELET # BLD AUTO: 174 K/UL (ref 150–450)
PMV BLD AUTO: 12 FL (ref 9.2–12.9)
POCT GLUCOSE: 184 MG/DL (ref 70–110)
POCT GLUCOSE: 73 MG/DL (ref 70–110)
POTASSIUM SERPL-SCNC: 3.9 MMOL/L (ref 3.5–5.1)
PROT SERPL-MCNC: 5 G/DL (ref 6–8.4)
RBC # BLD AUTO: 3.26 M/UL (ref 4–5.4)
SODIUM SERPL-SCNC: 137 MMOL/L (ref 136–145)
VANCOMYCIN SERPL-MCNC: 14.3 UG/ML
WBC # BLD AUTO: 7.93 K/UL (ref 3.9–12.7)

## 2024-08-20 PROCEDURE — 92526 ORAL FUNCTION THERAPY: CPT | Mod: HCNC

## 2024-08-20 PROCEDURE — 99232 SBSQ HOSP IP/OBS MODERATE 35: CPT | Mod: HCNC,,,

## 2024-08-20 PROCEDURE — 80202 ASSAY OF VANCOMYCIN: CPT | Mod: HCNC | Performed by: HOSPITALIST

## 2024-08-20 PROCEDURE — 51798 US URINE CAPACITY MEASURE: CPT | Mod: HCNC

## 2024-08-20 PROCEDURE — 25000003 PHARM REV CODE 250: Mod: HCNC

## 2024-08-20 PROCEDURE — 97535 SELF CARE MNGMENT TRAINING: CPT | Mod: HCNC

## 2024-08-20 PROCEDURE — 80053 COMPREHEN METABOLIC PANEL: CPT | Mod: HCNC | Performed by: HOSPITALIST

## 2024-08-20 PROCEDURE — 97530 THERAPEUTIC ACTIVITIES: CPT | Mod: HCNC

## 2024-08-20 PROCEDURE — 97110 THERAPEUTIC EXERCISES: CPT | Mod: HCNC,CQ

## 2024-08-20 PROCEDURE — 25000003 PHARM REV CODE 250: Mod: HCNC | Performed by: INTERNAL MEDICINE

## 2024-08-20 PROCEDURE — 63600175 PHARM REV CODE 636 W HCPCS: Mod: HCNC | Performed by: HOSPITALIST

## 2024-08-20 PROCEDURE — 21400001 HC TELEMETRY ROOM: Mod: HCNC

## 2024-08-20 PROCEDURE — 97112 NEUROMUSCULAR REEDUCATION: CPT | Mod: HCNC,CQ

## 2024-08-20 PROCEDURE — 83735 ASSAY OF MAGNESIUM: CPT | Mod: HCNC | Performed by: HOSPITALIST

## 2024-08-20 PROCEDURE — 85025 COMPLETE CBC W/AUTO DIFF WBC: CPT | Mod: HCNC | Performed by: HOSPITALIST

## 2024-08-20 PROCEDURE — 84100 ASSAY OF PHOSPHORUS: CPT | Mod: HCNC | Performed by: HOSPITALIST

## 2024-08-20 PROCEDURE — 25000003 PHARM REV CODE 250: Mod: HCNC | Performed by: HOSPITALIST

## 2024-08-20 RX ORDER — CALCIUM CARBONATE 200(500)MG
1000 TABLET,CHEWABLE ORAL DAILY
Start: 2024-08-21 | End: 2024-08-21 | Stop reason: HOSPADM

## 2024-08-20 RX ORDER — METHOCARBAMOL 500 MG/1
500 TABLET, FILM COATED ORAL 4 TIMES DAILY
Start: 2024-08-20

## 2024-08-20 RX ORDER — ACETAMINOPHEN 500 MG
500 TABLET ORAL EVERY 8 HOURS
Start: 2024-08-20

## 2024-08-20 RX ORDER — POLYETHYLENE GLYCOL 3350 17 G/17G
17 POWDER, FOR SOLUTION ORAL DAILY PRN
Start: 2024-08-20

## 2024-08-20 RX ORDER — INSULIN GLARGINE 100 [IU]/ML
24 INJECTION, SOLUTION SUBCUTANEOUS NIGHTLY
Status: DISCONTINUED | OUTPATIENT
Start: 2024-08-20 | End: 2024-08-21

## 2024-08-20 RX ORDER — TALC
6 POWDER (GRAM) TOPICAL NIGHTLY
Start: 2024-08-20

## 2024-08-20 RX ORDER — INSULIN ASPART 100 [IU]/ML
0-10 INJECTION, SOLUTION INTRAVENOUS; SUBCUTANEOUS
Start: 2024-08-20 | End: 2025-08-20

## 2024-08-20 RX ORDER — DIVALPROEX SODIUM 125 MG/1
250 CAPSULE, COATED PELLETS ORAL EVERY 8 HOURS
Start: 2024-08-20 | End: 2025-08-20

## 2024-08-20 RX ORDER — OXYCODONE HYDROCHLORIDE 5 MG/1
5 TABLET ORAL EVERY 6 HOURS PRN
Qty: 10 TABLET | Refills: 0 | Status: SHIPPED | OUTPATIENT
Start: 2024-08-20

## 2024-08-20 RX ORDER — SEMAGLUTIDE 0.68 MG/ML
INJECTION, SOLUTION SUBCUTANEOUS
Start: 2024-08-20

## 2024-08-20 RX ORDER — ONDANSETRON 8 MG/1
8 TABLET, ORALLY DISINTEGRATING ORAL EVERY 8 HOURS PRN
Start: 2024-08-20

## 2024-08-20 RX ORDER — HYDROXYZINE HYDROCHLORIDE 25 MG/1
25 TABLET, FILM COATED ORAL 3 TIMES DAILY PRN
Start: 2024-08-20

## 2024-08-20 RX ORDER — QUETIAPINE FUMARATE 50 MG/1
50 TABLET, FILM COATED ORAL NIGHTLY
Start: 2024-08-20 | End: 2025-08-20

## 2024-08-20 RX ORDER — INSULIN GLARGINE 100 [IU]/ML
24 INJECTION, SOLUTION SUBCUTANEOUS NIGHTLY
Start: 2024-08-20 | End: 2024-08-21

## 2024-08-20 RX ORDER — ALUMINUM HYDROXIDE, MAGNESIUM HYDROXIDE, AND SIMETHICONE 2400; 240; 2400 MG/30ML; MG/30ML; MG/30ML
30 SUSPENSION ORAL EVERY 6 HOURS PRN
Start: 2024-08-20 | End: 2025-08-20

## 2024-08-20 RX ORDER — OXYCODONE HYDROCHLORIDE 10 MG/1
10 TABLET ORAL EVERY 6 HOURS PRN
Qty: 10 TABLET | Refills: 0 | Status: SHIPPED | OUTPATIENT
Start: 2024-08-20

## 2024-08-20 RX ORDER — ISOSORBIDE MONONITRATE 60 MG/1
60 TABLET, EXTENDED RELEASE ORAL DAILY
Start: 2024-08-21 | End: 2025-08-21

## 2024-08-20 RX ORDER — ACETAMINOPHEN 500 MG
500 TABLET ORAL EVERY 8 HOURS
Status: DISCONTINUED | OUTPATIENT
Start: 2024-08-20 | End: 2024-08-21 | Stop reason: HOSPADM

## 2024-08-20 RX ORDER — SEMAGLUTIDE 1.34 MG/ML
1 INJECTION, SOLUTION SUBCUTANEOUS
Start: 2024-08-20 | End: 2025-08-20

## 2024-08-20 RX ADMIN — APIXABAN 2.5 MG: 2.5 TABLET, FILM COATED ORAL at 08:08

## 2024-08-20 RX ADMIN — DEXTROSE MONOHYDRATE 250 MG: 50 INJECTION, SOLUTION INTRAVENOUS at 06:08

## 2024-08-20 RX ADMIN — FLUOXETINE HYDROCHLORIDE 40 MG: 20 CAPSULE ORAL at 08:08

## 2024-08-20 RX ADMIN — ACETAMINOPHEN 500 MG: 500 TABLET ORAL at 01:08

## 2024-08-20 RX ADMIN — ALUMINUM HYDROXIDE, MAGNESIUM HYDROXIDE, SIMETHICONE 30 ML: 400; 400; 40 SUSPENSION ORAL at 06:08

## 2024-08-20 RX ADMIN — SODIUM BICARBONATE 650 MG TABLET 650 MG: at 08:08

## 2024-08-20 RX ADMIN — CEFTRIAXONE 1 G: 1 INJECTION, POWDER, FOR SOLUTION INTRAMUSCULAR; INTRAVENOUS at 01:08

## 2024-08-20 RX ADMIN — ISOSORBIDE MONONITRATE 60 MG: 30 TABLET, EXTENDED RELEASE ORAL at 08:08

## 2024-08-20 RX ADMIN — METOPROLOL SUCCINATE 25 MG: 25 TABLET, EXTENDED RELEASE ORAL at 08:08

## 2024-08-20 RX ADMIN — DEXTROSE MONOHYDRATE 250 MG: 50 INJECTION, SOLUTION INTRAVENOUS at 11:08

## 2024-08-20 RX ADMIN — QUETIAPINE FUMARATE 50 MG: 25 TABLET ORAL at 08:08

## 2024-08-20 RX ADMIN — OXYCODONE HYDROCHLORIDE 10 MG: 10 TABLET ORAL at 05:08

## 2024-08-20 RX ADMIN — ALUMINUM HYDROXIDE, MAGNESIUM HYDROXIDE, SIMETHICONE 30 ML: 400; 400; 40 SUSPENSION ORAL at 10:08

## 2024-08-20 RX ADMIN — ALUMINUM HYDROXIDE, MAGNESIUM HYDROXIDE, SIMETHICONE 30 ML: 400; 400; 40 SUSPENSION ORAL at 03:08

## 2024-08-20 RX ADMIN — ACETAMINOPHEN 500 MG: 500 TABLET ORAL at 08:08

## 2024-08-20 RX ADMIN — METHOCARBAMOL 500 MG: 500 TABLET ORAL at 04:08

## 2024-08-20 RX ADMIN — CALCIUM CARBONATE (ANTACID) CHEW TAB 500 MG 1000 MG: 500 CHEW TAB at 08:08

## 2024-08-20 RX ADMIN — METHOCARBAMOL 500 MG: 500 TABLET ORAL at 08:08

## 2024-08-20 RX ADMIN — PANTOPRAZOLE SODIUM 40 MG: 40 TABLET, DELAYED RELEASE ORAL at 08:08

## 2024-08-20 RX ADMIN — HYDROXYZINE HYDROCHLORIDE 25 MG: 25 TABLET, FILM COATED ORAL at 08:08

## 2024-08-20 RX ADMIN — Medication 6 MG: at 08:08

## 2024-08-20 RX ADMIN — METHOCARBAMOL 500 MG: 500 TABLET ORAL at 01:08

## 2024-08-20 RX ADMIN — ACETAMINOPHEN 1000 MG: 325 TABLET ORAL at 06:08

## 2024-08-20 RX ADMIN — VANCOMYCIN HYDROCHLORIDE 500 MG: 500 INJECTION, POWDER, LYOPHILIZED, FOR SOLUTION INTRAVENOUS at 08:08

## 2024-08-20 RX ADMIN — OXYCODONE 5 MG: 5 TABLET ORAL at 06:08

## 2024-08-20 RX ADMIN — INSULIN ASPART 2 UNITS: 100 INJECTION, SOLUTION INTRAVENOUS; SUBCUTANEOUS at 06:08

## 2024-08-20 RX ADMIN — ALUMINUM HYDROXIDE, MAGNESIUM HYDROXIDE, SIMETHICONE 30 ML: 400; 400; 40 SUSPENSION ORAL at 12:08

## 2024-08-20 NOTE — ASSESSMENT & PLAN NOTE
Abd pain so avoiding extra oral regimen 8/18 and ageeeable to suppository-- wasn't given for ucnlear reasons 8/18 so given thsi Am with nursing. If not resolved will do enema  Successful bms 8/20

## 2024-08-20 NOTE — PT/OT/SLP PROGRESS
Physical Therapy Co Treatment  OT present for cotreat due to pt's multiple medical comorbidities and functional/cognition deficits requiring two skilled therapists to appropriately progress pt's musculoskeletal strength, neuromuscular control, and endurance while taking into consideration medical acuity and pt safety.     Patient Name:  Lorena Contreras   MRN:  [id number]    Recommendations:     Discharge Recommendations: High Intensity Therapy  Discharge Equipment Recommendations: wheelchair  Barriers to discharge: None    Assessment:     Lorena Contreras is a 73 y.o. female admitted with a medical diagnosis of Closed right pilon fracture.  She presents with the following impairments/functional limitations: weakness, impaired endurance, gait instability, impaired balance, impaired functional mobility, decreased lower extremity function, decreased safety awareness, decreased coordination, orthopedic precautions, impaired self care skills. Pt requires 2 person assist to stand and unable to laterally scoot at this time, will continue to work on improving pt personal mobility w/WC.    Rehab Prognosis: Fair; patient would benefit from acute skilled PT services to address these deficits and reach maximum level of function.    Recent Surgery: Procedure(s) (LRB):  ORIF, FRACTURE, ACETABULUM (Right) 4 Days Post-Op    Plan:     During this hospitalization, patient to be seen 4 x/week to address the identified rehab impairments via gait training, therapeutic activities, therapeutic exercises, neuromuscular re-education, wheelchair management/training and progress toward the following goals:    Plan of Care Expires:  09/14/24    Subjective     Chief Complaint: Pain  Patient/Family Comments/goals: To get to wheelchair  Pain/Comfort:  Pain Rating 1: 6/10  Location - Side 1: Right  Location - Orientation 1: generalized  Location 1: leg  Pain Addressed 1: Reposition, Distraction  Pain Rating Post-Intervention 1:  6/10      Objective:     Communicated with RN prior to session.  Patient found supine with peripheral IV, PureWick, telemetry upon PT entry to room.     General Precautions: Standard, fall  Orthopedic Precautions: RLE non weight bearing  Braces: N/A  Respiratory Status: Room air     Functional Mobility:    Bed Mobility:   Rolling: to R side with minimum assistance  Supine > Sit: moderate assistance and of 2 persons  Sit > Supine: moderate assistance    Transfers:   Sit <> Stand Transfer: moderate assistance, total assistance, and of 2 persons from EOB using rolling walker .  2 attempts to stand; Total A x 2 for half stand during first attempt, rested 2-3min and stood once more w/mod A x 2, maintains stand for 60s w/cues for posture, able to come to full stand w/cues to maintain NWB to RLE    Balance:   Sitting balance: GOOD-: Incosistently Maintains balance through MODERATE excursions of active trunk movement,     Standing balance:   POOR+: Needs MINIMAL assist to maintain                 Unable to take steps at this time.         AM-PAC 6 CLICK MOBILITY  Turning over in bed (including adjusting bedclothes, sheets and blankets)?: 2  Sitting down on and standing up from a chair with arms (e.g., wheelchair, bedside commode, etc.): 2  Moving from lying on back to sitting on the side of the bed?: 2  Moving to and from a bed to a chair (including a wheelchair)?: 1  Need to walk in hospital room?: 1  Climbing 3-5 steps with a railing?: 1  Basic Mobility Total Score: 9       Treatment & Education:  Education regarding PT role to increase strength and personal mobility as well as PoC to improve ability to transfer to WC.  Assisted pt EOB to work on increasing LLE strength and stability in order to prepare for WC transfers.  Performed Long arc quads x 10 BLE in sitting  Marches x 10 BLE in sitting  Cues to maintain balance during seated there ex    Patient left supine with all lines intact and call button in  reach..    GOALS:   Multidisciplinary Problems       Physical Therapy Goals          Problem: Physical Therapy    Goal Priority Disciplines Outcome Goal Variances Interventions   Physical Therapy Goal     PT, PT/OT Progressing     Description: Goals to be met by: 24     Patient will increase functional independence with mobility by performin. Supine to sit with Stand-by Assistance  2. Sit to stand transfer with Minimal Assistance  3. Bed to chair transfer with Minimal Assistance using Rolling Walker  4. Gait  x 10 feet with Minimal Assistance using Rolling Walker.   5. Wheelchair propulsion x100 feet with Supervision using bilateral upper extremities  6. Stand for 5 minutes with Contact Guard Assistance using Rolling Walker    Patient has a mobility limitation that significantly impairs their ability to participate in one or more mobility related activities of daily living in customary locations in the home. The mobility limitation cannot be sufficiently resolved by the use of a cane or walker. The use of a manual wheelchair will greatly improve the patient's ability to participate in MRADLs. The patient will use the wheelchair on a regular basis at home. They have expressed their willingness to use a manual wheelchair in the home, and have a caregiver who is available and willing to assist with the wheelchair if needed.                         Time Tracking:     PT Received On: 24  PT Start Time: 1057     PT Stop Time: 1123  PT Total Time (min): 26 min     Billable Minutes: Therapeutic Exercise 10mins and Neuromuscular Re-education 16mins    Treatment Type: Treatment  PT/PTA: PTA     Number of PTA visits since last PT visit: 2024

## 2024-08-20 NOTE — SUBJECTIVE & OBJECTIVE
Interval history- mental status doing very well and at baseline still. Cr stable at 1.7, hydrating well. At home drinks coffee a lot but didn't have any on tray this am. Ate some grits. Appetite picking up. Glucose 60s this AM, hold novolog and do sliding scale and will redose later once eating more and when appropraite and dec levemir a few units tonight pending trends. Hg stable at 9.4. will plan to complete antibiotics likely tomorrow 1 more dose before dc for 5 day course given extended confusion for multiple reasons. Psych reports can change to oral depakoaet and cont at SNF and if doing well can d/c while there and would cont melatonin and seroquel qhs as had chronci sleep issues. Plan for snf tomorrow as doing well. BM today              Review of patient's allergies indicates:   Allergen Reactions    Novolin 70/30 (semi-synthetic) Nausea And Vomiting     Severe vomiting on Relion 70/30    Sulfa (sulfonamide antibiotics) Anaphylaxis    Talwin [pentazocine lactate] Anaphylaxis    Victoza [liraglutide] Nausea And Vomiting    Glipizide Nausea Only    Citric acid     Codeine     Influenza virus vaccines Hives    Iodine and iodide containing products Hives    Levetiracetam Itching    Neurontin [gabapentin]      Possible associated myoclonic jerk    Rituxan [rituximab] Hives    Zoloft [sertraline] Nausea And Vomiting       No current facility-administered medications on file prior to encounter.     Current Outpatient Medications on File Prior to Encounter   Medication Sig    Bacillus coagulans (DIGESTIVE ADVANTAGE IMMUNE ORAL) Take by mouth.    diclofenac sodium (VOLTAREN TOP) Apply topically.    gabapentin (NEURONTIN) 300 MG capsule Take 1 capsule (300 mg total) by mouth 3 (three) times daily.    hydrALAZINE (APRESOLINE) 50 MG tablet Take 1 tablet (50 mg total) by mouth every 8 (eight) hours.    LANTUS SOLOSTAR U-100 INSULIN 100 unit/mL (3 mL) InPn pen Inject 12 Units into the skin every evening.    NOVOLOG  FLEXPEN U-100 INSULIN 100 unit/mL (3 mL) InPn pen Inject 20 Units into the skin 3 (three) times daily with meals.    albuterol (PROVENTIL/VENTOLIN HFA) 90 mcg/actuation inhaler Inhale 2 puffs into the lungs every 6 (six) hours as needed for Wheezing or Shortness of Breath.    ASCORBIC ACID, VITAMIN C, ORAL Take by mouth once daily.    aspirin 81 MG Chew Take 1 tablet (81 mg total) by mouth once daily.    atorvastatin (LIPITOR) 80 MG tablet Take 1 tablet by mouth in the evening    cholecalciferol, vitamin D3, (VITAMIN D3) 50 mcg (2,000 unit) Cap Take 2 capsules by mouth 2 (two) times daily.    cyanocobalamin (VITAMIN B-12) 1000 MCG tablet Take 100 mcg by mouth once daily.    DEXCOM G7  Misc Use 1  to track blood glucose, ICD10: E11.65    DEXCOM G7 SENSOR Samina Use 1 sensor every 10 days to track blood glucose, ICD10: E11.65, okay with 90 day supply if possible    EPINEPHrine (EPIPEN) 0.3 mg/0.3 mL AtIn Inject 0.3 mLs (0.3 mg total) into the muscle once. for 1 dose    ESOMEPRAZOLE MAGNESIUM ORAL Take by mouth as needed. (Patient not taking: Reported on 5/28/2024)    FLUoxetine 40 MG capsule Take 1 capsule (40 mg total) by mouth once daily.    isosorbide mononitrate (IMDUR) 30 MG 24 hr tablet Take 1 tablet (30 mg total) by mouth once daily.    LIDOcaine (LIDODERM) 5 % Place 1 patch onto the skin once daily. Remove & Discard patch within 12 hours or as directed by MD.    LORazepam (ATIVAN) 1 MG tablet Take 1 tablet (1 mg total) by mouth 2 (two) times daily as needed for Anxiety.    magnesium oxide (MAG-OX) 400 mg tablet Take 400 mg by mouth once daily.    melatonin 10 mg Cap Take 10 mg by mouth nightly.    metoprolol succinate (TOPROL-XL) 25 MG 24 hr tablet Take 1 tablet (25 mg total) by mouth once daily.    multivitamin/iron/folic acid (CENTRUM COMPLETE ORAL) Take by mouth.    OZEMPIC 1 mg/dose (4 mg/3 mL) Inject 1 mg into the skin every 7 days.    pantoprazole (PROTONIX) 40 MG tablet Take 1 tablet (40  "mg total) by mouth once daily.    pen needle, diabetic (BD ULTRA-FINE SAGAR PEN NEEDLE) 32 gauge x 5/32" Ndle One pen needle use with insulin pen 4 times a day.  ICD-10: E11.9    semaglutide (OZEMPIC) 0.25 mg or 0.5 mg (2 mg/3 mL) pen injector Inject 0.5 mg once weekly thereafter    ticagrelor (BRILINTA) 90 mg tablet Take 1 tablet (90 mg total) by mouth 2 (two) times daily.    vitamin E 1000 UNIT capsule Take 1,000 Units by mouth once daily.     Family History       Problem Relation (Age of Onset)    Allergy (severe) Daughter    Cancer Mother, Father    Diabetes Sister    Heart disease Mother    Hypertension Sister    Lung cancer Brother    No Known Problems Daughter          Tobacco Use    Smoking status: Never    Smokeless tobacco: Never   Substance and Sexual Activity    Alcohol use: Not Currently    Drug use: Never    Sexual activity: Not Currently     Partners: Male     Review of Systems   Constitutional:  Positive for activity change. Negative for chills and fever.   HENT:  Negative for congestion, hearing loss, rhinorrhea and sore throat.    Eyes:  Negative for visual disturbance.   Respiratory:  Negative for shortness of breath.    Cardiovascular:  Negative for chest pain and leg swelling.   Gastrointestinal:  Negative for abdominal pain, constipation, diarrhea, nausea and vomiting.   Genitourinary:  Negative for dysuria, frequency and urgency.   Musculoskeletal:  Positive for arthralgias and myalgias.   Neurological:  Positive for numbness (chronic).     Objective:     Vital Signs (Most Recent):  Temp: 98.9 °F (37.2 °C) (08/20/24 1141)  Pulse: 83 (08/20/24 1141)  Resp: 17 (08/20/24 1141)  BP: (!) 155/67 (08/20/24 1141)  SpO2: 98 % (08/20/24 1141) Vital Signs (24h Range):  Temp:  [96.4 °F (35.8 °C)-98.9 °F (37.2 °C)] 98.9 °F (37.2 °C)  Pulse:  [81-90] 83  Resp:  [17-18] 17  SpO2:  [94 %-98 %] 98 %  BP: ()/(46-75) 155/67     Weight: 81.2 kg (179 lb 0.2 oz)  Body mass index is 26.44 kg/m².     Physical " Exam  Constitutional:       General: She is not in acute distress.     Appearance: Normal appearance. She is not ill-appearing.      Comments: . Much improved mental status.    HENT:      Head: Normocephalic and atraumatic.   Eyes:      Extraocular Movements: Extraocular movements intact.   Cardiovascular:      Rate and Rhythm: Normal rate and regular rhythm.      Heart sounds: Murmur heard.      No friction rub. No gallop.   Pulmonary:      Effort: Pulmonary effort is normal.      Breath sounds: Normal breath sounds. No wheezing, rhonchi or rales.   Abdominal:      General: There is no distension.      Tenderness: There is no abdominal tenderness. There is no guarding.   Musculoskeletal:         General: Tenderness and signs of injury present.      Right lower leg: No edema.      Left lower leg: No edema.      Comments: [place] to right ankle and right LE post op.  TTP over R hip  Sensation diminished to toes 2/2 neuropathy (chronic)   Neurological:      General: No focal deficit present.      Mental Status: She is alert and oriented to person, place, and time.      Comments:  Oriented to place, situation, surgery, hospital. Mental status much better and sustained at baseline                Significant Labs: All pertinent labs within the past 24 hours have been reviewed.  BMP:   Recent Labs   Lab 08/20/24  0439   GLU 64*      K 3.9      CO2 23   BUN 40*   CREATININE 1.7*   CALCIUM 8.0*   MG 2.1     CBC:   Recent Labs   Lab 08/19/24  0530 08/20/24  0439   WBC 7.12 7.93   HGB 9.0* 9.4*   HCT 28.6* 29.4*    174       Significant Imaging: I have reviewed all pertinent imaging results/findings within the past 24 hours.    Imaging Results              CT Ankle (Including Hindfoot) Without Contrast Right (Final result)  Result time 08/14/24 04:49:46      Final result by Portillo Oquendo MD (08/14/24 04:49:46)                   Impression:      Near anatomic alignment of complex mildly displaced  distal tibia fracture and mildly displaced distal fibular fracture.    Electronically signed by resident: Hira Patel  Date:    08/14/2024  Time:    03:56    Electronically signed by: Portillo Oquendo MD  Date:    08/14/2024  Time:    04:49               Narrative:    EXAMINATION:  CT ANKLE (INCLUDING HINDFOOT) WITHOUT CONTRAST RIGHT; CT 3D RENDERING WO INDEPENDENT WORKSTATION    CLINICAL HISTORY:  Fracture, ankle;; Fracture, ankle;per order request;    TECHNIQUE:  Axial 0.625-mm images of the right ankle obtained without intravenous contrast.  Data submitted for coronal and sagittal reformats.  3D reconstructed images were acquired by post processing on an independent workstation with concurrent supervision and archived for permanent record. 3D imaging of the right ankle was obtained for further evaluation and operative planning if needed.    COMPARISON:  Ankle radiograph 03/14/2024.    FINDINGS:  Bones are demineralized.  There is a cast in place.  There is mildly displaced comminuted distal tibial fracture and a minimally displaced distal fibular fracture with medial angulation of the fracture fragment.  Intra-articular extension fracture fragments which are mildly displaced involving the distal tibia near anatomic alignment of fracture fragments.  Fractures involve the posterior and medial malleoli.  No dislocation.  There are a few foci of subcutaneous gas overlying the tibial fracture, possibly relating to trauma or reduction procedure though correlation is advised.  Dense calcific tendinosis of the posterior tibial tendon.  No full-thickness injury of the Achilles tendon.  Soft tissue edema about the ankle.  Prominent vascular calcifications noted.                                       CT 3D Rendering WO Independent Workstation (Final result)  Result time 08/14/24 04:49:46      Final result by Portillo Oquendo MD (08/14/24 04:49:46)                   Impression:      Near anatomic alignment of complex  mildly displaced distal tibia fracture and mildly displaced distal fibular fracture.    Electronically signed by resident: Hira Patel  Date:    08/14/2024  Time:    03:56    Electronically signed by: Portillo Oquendo MD  Date:    08/14/2024  Time:    04:49               Narrative:    EXAMINATION:  CT ANKLE (INCLUDING HINDFOOT) WITHOUT CONTRAST RIGHT; CT 3D RENDERING WO INDEPENDENT WORKSTATION    CLINICAL HISTORY:  Fracture, ankle;; Fracture, ankle;per order request;    TECHNIQUE:  Axial 0.625-mm images of the right ankle obtained without intravenous contrast.  Data submitted for coronal and sagittal reformats.  3D reconstructed images were acquired by post processing on an independent workstation with concurrent supervision and archived for permanent record. 3D imaging of the right ankle was obtained for further evaluation and operative planning if needed.    COMPARISON:  Ankle radiograph 03/14/2024.    FINDINGS:  Bones are demineralized.  There is a cast in place.  There is mildly displaced comminuted distal tibial fracture and a minimally displaced distal fibular fracture with medial angulation of the fracture fragment.  Intra-articular extension fracture fragments which are mildly displaced involving the distal tibia near anatomic alignment of fracture fragments.  Fractures involve the posterior and medial malleoli.  No dislocation.  There are a few foci of subcutaneous gas overlying the tibial fracture, possibly relating to trauma or reduction procedure though correlation is advised.  Dense calcific tendinosis of the posterior tibial tendon.  No full-thickness injury of the Achilles tendon.  Soft tissue edema about the ankle.  Prominent vascular calcifications noted.                                       X-Ray Ankle Complete Right (Final result)  Result time 08/14/24 03:44:09      Final result by Portillo Oquendo MD (08/14/24 03:44:09)                   Impression:      Similar alignment of distal tibia  and distal fibula fractures post reduction.      Electronically signed by: Portillo Oquendo MD  Date:    08/14/2024  Time:    03:44               Narrative:    EXAMINATION:  XR ANKLE COMPLETE 3 VIEW RIGHT    CLINICAL HISTORY:  Post reduction;    TECHNIQUE:  AP, lateral, and oblique images of the right ankle were performed.    COMPARISON:  08/13/2024.    FINDINGS:  Exam quality is limited by suboptimal positioning and overlapping cast material.  Acute fractures of the distal tibia and distal fibula as seen previously.  Bones are demineralized.  No gross dislocation or significant worsening alignment of fracture fragments.  Soft tissue edema.                                       CT Pelvis Without Contrast (Final result)  Result time 08/13/24 20:58:12      Final result by Quiana Chawla MD (08/13/24 20:58:12)                   Impression:      Acute traumatic fractures involving the right iliac wing, the anterior pillar of the right acetabulum and the lateral most aspect of the right superior ramus, and the right inferior pubic ramus.  No hip dislocation.      Electronically signed by: Quiana Chawla  Date:    08/13/2024  Time:    20:58               Narrative:    EXAMINATION:  CT PELVIS WITHOUT CONTRAST    CLINICAL HISTORY:  Pelvic trauma;    TECHNIQUE:  1.25 mm axial images were obtained through the pelvis from above the iliac crest through the upper femoral shafts.    COMPARISON:  Plain hip radiograph 08/13/2020 full    FINDINGS:  There is a minimally displaced fracture of the right iliac wing.  There are comminuted fractures involving the anterior pillar of the right acetabulum and the lateral most aspect of the right superior ramus.  There is comminuted and overlapping fracture of the right inferior pubic ramus.  The hips are not dislocated.  The SI joints are intact.  There are degenerative changes seen at the sacroiliac joints and the pubic symphysis.  Bones are osteopenic.  Incidental note is made of  vascular calcifications and a small fat containing umbilical hernia.                                       CT Lumbar Spine Without Contrast (Final result)  Result time 08/13/24 20:31:47      Final result by Quiana Chawla MD (08/13/24 20:31:47)                   Impression:      No acute lumbar fracture.  Multilevel degenerative change greatest at L4/L5.    Small bilateral pleural effusions right greater than left.    Gallbladder sludge or gravel-like gallstones.      Electronically signed by: Quiana Chawla  Date:    08/13/2024  Time:    20:31               Narrative:    EXAMINATION:  CT OF THE LUMBAR SPINE WITHOUT    CLINICAL HISTORY:  Complaining of right hip pain secondary to fall from standing.    TECHNIQUE:  1.25 mm axial images were obtained through the lumbar spine. Coronal and sagittal reformatted images were provided.    COMPARISON:  None.    FINDINGS:  There is no lumbar vertebral body fracture.  There is grade 1 anterolisthesis of L4 on L5.  At L3/L4, there is moderate central canal narrowing secondary to the facet arthrosis and ligamentum flavum hypertrophy without significant foraminal stenosis.  At L4/L5, there is no stone significant central canal narrowing, but there is bilateral mild foraminal narrowing.  Secondary to ligamentum flavum hypertrophy and facet arthrosis.  At L5/S1, there is a broad-based disc bulge without any significant central canal stenosis or foraminal stenosis.  There is small marginal osteophytes throughout.  Vacuum phenomena is seen at L4/L5 with mild intervertebral disc space narrowing.  Incidental note is made of small bilateral pleural effusions right greater than left and gallbladder sludge or gravel-like gallstones.                                       X-Ray Chest AP Portable (Final result)  Result time 08/13/24 17:49:32      Final result by Eddie Montilla MD (08/13/24 17:49:32)                   Impression:      1. Interstitial findings may reflect edema  versus chronic change, no large focal consolidation.      Electronically signed by: Eddie Montilla MD  Date:    08/13/2024  Time:    17:49               Narrative:    EXAMINATION:  XR CHEST AP PORTABLE    CLINICAL HISTORY:  Chest Pain;    TECHNIQUE:  Single frontal view of the chest was performed.    COMPARISON:  11/03/2023    FINDINGS:  The cardiomediastinal silhouette is prominent, magnified by technique, similar to the previous exam noting calcification of the aorta..  There is no pleural effusion.  The trachea is midline.  The lungs are symmetrically expanded bilaterally with coarse interstitial attenuation in a central hilar distribution..  No large focal consolidation seen.  There is no pneumothorax.  The osseous structures are remarkable for degenerative changes..                                       X-Ray Ankle Complete Right (Final result)  Result time 08/13/24 17:50:39      Final result by Eddie Montilla MD (08/13/24 17:50:39)                   Impression:      1. Distal tibial and fibular fractures as described.      Electronically signed by: Eddie Montilla MD  Date:    08/13/2024  Time:    17:50               Narrative:    EXAMINATION:  XR ANKLE COMPLETE 3 VIEW RIGHT    CLINICAL HISTORY:  Unspecified fall, initial encounter    TECHNIQUE:  AP, lateral, and oblique images of the right ankle were performed.    COMPARISON:  Radiograph of the foot 06/01/2021    FINDINGS:  Three views right ankle.    There is osteopenia.  There is acute appearing fracture involving the distal aspect of the fibula.  There is comminuted fracture of the medial malleolus.  The ankle mortise is intact.  There is edema about the ankle.  The posterior aspect of the tibia appears intact.  There are degenerative changes of the calcaneus.  There is vascular calcification.                                       X-Ray Hip 2 or 3 views Right with Pelvis when performed (Final result)  Result time 08/13/24 17:53:13      Final result  by Eddie Montilla MD (08/13/24 17:53:13)                   Impression:      1. Fractures of the right inferior and superior pubic rami.  2. There is cortical irregularity involving the right iliac wing, concerning for additional fracture.      Electronically signed by: Eddie Montilla MD  Date:    08/13/2024  Time:    17:53               Narrative:    EXAMINATION:  XR HIP WITH PELVIS WHEN PERFORMED 2 OR 3 VIEWS RIGHT    CLINICAL HISTORY:  Unspecified fall, initial encounter    TECHNIQUE:  AP view of the pelvis and frog leg lateral view of the right hip were performed.    COMPARISON:  None    FINDINGS:  Three views right hip.    The bilateral sacroiliac joints are intact.  The pubic symphysis is intact.  There is osteopenia.  There are fractures of the right superior and inferior pubic rami.  The bilateral femoroacetabular joints are intact noting degenerative change.  There is fracture involving the right iliac wing.                                       X-Ray Knee 1 or 2 View Right (Final result)  Result time 08/13/24 17:53:54   Procedure changed from X-Ray Knee 3 View Right     Final result by Eddie Montilla MD (08/13/24 17:53:54)                   Impression:      1. No acute displaced fracture or dislocation of the knee.      Electronically signed by: Eddie Montilla MD  Date:    08/13/2024  Time:    17:53               Narrative:    EXAMINATION:  XR KNEE 1 OR 2 VIEW RIGHT    CLINICAL HISTORY:  Pain;  Unspecified fall, initial encounter    TECHNIQUE:  AP and lateral views of the right knee were performed.    COMPARISON:  11/20/2014    FINDINGS:  Two views right knee.    There is osteopenia.  There are degenerative changes of the knee.  There is vascular calcification.  No large right knee joint effusion.

## 2024-08-20 NOTE — PLAN OF CARE
Problem: Adult Inpatient Plan of Care  Goal: Plan of Care Review  Outcome: Progressing  Goal: Patient-Specific Goal (Individualized)  Outcome: Progressing  Goal: Absence of Hospital-Acquired Illness or Injury  Outcome: Progressing  Goal: Optimal Comfort and Wellbeing  Outcome: Progressing  Goal: Readiness for Transition of Care  Outcome: Progressing     Problem: Infection  Goal: Absence of Infection Signs and Symptoms  Outcome: Progressing     Problem: Diabetes Comorbidity  Goal: Blood Glucose Level Within Targeted Range  Outcome: Progressing     Problem: Skin Injury Risk Increased  Goal: Skin Health and Integrity  Outcome: Progressing     Problem: Wound  Goal: Optimal Coping  Outcome: Progressing  Goal: Optimal Functional Ability  Outcome: Progressing  Goal: Absence of Infection Signs and Symptoms  Outcome: Progressing  Goal: Improved Oral Intake  Outcome: Progressing  Goal: Optimal Pain Control and Function  Outcome: Progressing  Goal: Skin Health and Integrity  Outcome: Progressing  Goal: Optimal Wound Healing  Outcome: Progressing     Problem: Acute Kidney Injury/Impairment  Goal: Fluid and Electrolyte Balance  Outcome: Progressing  Goal: Improved Oral Intake  Outcome: Progressing  Goal: Effective Renal Function  Outcome: Progressing     Problem: Fall Injury Risk  Goal: Absence of Fall and Fall-Related Injury  Outcome: Progressing     Problem: Restraint, Nonviolent  Goal: Absence of Harm or Injury  Outcome: Progressing   Addressed patients pain, position, need for potty, and possessions in reach. Patient remains free of falls, and verbalizes understanding in fall prevention and safety. Safety measures remain in place. Reinforced fall prevention and safety education. Call light and personal belongings within reach, bed in lowest position with wheels locked, side rails up x2, Instructed to call with any needs or complaints, verbalized understanding. Continue current plan of care.

## 2024-08-20 NOTE — MEDICAL/APP STUDENT
[facility] - Surgery  Progress Note    Patient Name: Lorena Contreras  MRN: [id number]  Patient Class: IP- Inpatient   Admission Date: 8/13/2024  Length of Stay: 6 days  Attending Physician: Rupal Ochoa MD  Primary Care Provider: Jorge Paris MD    Subjective:     CC: R Ankle, Pelvic Ring fractures     HPI:   Ms. Lorena Contreras is a very pleasant 73yoF with PMHx T2DM with chronic bilateral neuropathy, fibromyalgia, HTN, HLD, Chronic diastolic HF, MI, CVA, PVD, CAD, CKD, GERD, and remote history of follicular lymphoma (2009). She [place] [facility] [place] of R ankle and pelvis after fall from standing height in the grocery store in the evening 8/13.  Describes twisting her ankle, being unable to catch herself, and falling to her right. Denies head strike; denies LOC. Felt immediate pain in her right ankle and hip and was unable to ambulate. Pain was worst in her ankle, and aggravated by movement. Denies prior falls. She walks unassited at baseline. Lives with her grandson, but completes most ADLs independently.     ED Course:  Labs significant for Hgb 10.9, Hct 33.2, eGFR 27.5, Cr 1.9 (CKD4, baseline Cr ~1.2-1.5, 1.8 in July), Gluc 348, AST 50, ALT 45, , Troponin 0.013. UA positive for protein, glucose, blood, leukocytes (1+), WBCs, and scant bacteria. CXR demonstrating mild edema vs. Chronic changes (no significant change from prior imaging). R ankle XR with fractures of the distal tibia and fibula. R Hip XR with fractures of the inferior and superior pubic rami as well as suspicion for fracture of the illiac wing. Better demonstrated on CT pelvis, which showed fractures of the R iliac wing, anterior pillar of the R acetabulum, lateral superior ramus, and R inferior pubic ramus. Ortho evaluated in [facility] ED and plan for surgery 8/14.      Hospital Course :  R Ankle ORIF by Dr. Davalos 8/14. Plan for surgical fixation of the pelvis 8/16. Interim DVT  Prophylaxis with heparin. Mag repleted and increased to 2.2 from 1.5 on admit. Supplementing chronically low bicarb in setting of CKD4. Hgb decreased to 6.8 8/17; transfusion initiated. Hgb 8/18 increased to 10. UA equivocal for UTI; culture positive for E coli, E faecalis. Started on Rocephin, vancomycin 8/17 as patient is allergic to sulfa antibiotics. 8/17 patient was found to be delirious, significantly altered from baseline. Alert and oriented to person only. She attempted to pull her lines out necessitating restraints. Restraints were removed the next morning, 8/18, when patient was found to be more oriented, able to accurately converse about personal facts (recent surgeries, home, daughters, hobbies, etc) that she had described earlier in the admission. She was more altered later in the day after what sounded like a possible panic attack, and restraints had to be replaced mid-day as she started pulling out her lines in confusion. She responded somewhat to 0.5mg ativan for the panic attack. With delirium later, given Zyprexa 5mg (no results), then IV benadryl (eyelids heavy but fighting sleep). She became more altered and agitated around 18:30, and was given single dose of 10 Zyprexa. Her daughters were in the room and reported that at various times their mother had described seeing a strange man in the corner of the room, a baby, cats, and was heard to be conversing with the walls. Witnessed by physician and nursing staff. Delirium was felt to be the consequence of her known anxiety, very poor sleep in the hospital, and 2 UTIs currently undergoing treatment. Delirium precautions in place. Prioritizing chemical restraints first, physical only if chemical fail. Psych consulted to assist with evaluation of delirium and recommendations for sleep, delirium PRNs. Recommended depakote TID, Seroquel, and melatonin with vast improvement in sleep, mood, and mental status since. Recommending to continue at SNF on  "eventual discharge.    Ms. Contreras refused to eat for most of 8/18, 8/19, aside from some pudding, cream of wheat, and a protein drink her daughters brought from home. Reported stomach burning, and asked for Maalox/Tums which were given with relief. Glucose control has been difficult since; held novolog and decreased daily long acting in setting of decreased intake.No BM since admit and patient "feeling full". Suppository attempted 8/18 but patient reportedly denied.  Restraints removed AM 8/19. Completed 8/19 and BM 8/20. Benoit out, void trial 8/20. Hypocalcemia, replacing 8/18.    Interval History:   Ms. Contreras alert and oriented this morning, lying comfortably in bed and feeling much more herself. Calm, conversant, and apologetic over "ruckus" this weekend. She reports another good night of sleep which has immensely improved her mood and clarity. Restraints were removed and remain off with no issues. Benoit removed, pure-wick in place. Suppository given yesterday, BM today. Describes painful gas. No complaint of pain at the abdominal incision today. Patient has been eating and drinking better today; ate approximately half her breakfast. Glucose 64 thos morning before breakfast, 112 on dexcom in mid morning shortly after breakfast. Will continue to hold SA insulin, decreased LA insulin while patient is eating little and sugars are returning to normal. Hgb stable at 9.4. Cr and eGFR at baseline. Ongoing tremor; continue to hold gabapentin in case it is a contributor and the patient feels she has a bad reaction (mood) to gabapentin anyway. Tremor is felt to be more likely anxiety related.   Ms. Contreras's main concern today is a tender, "bruise"-like pain over the sole of her L foot, and neuropathic pain in the right foot. She refused gabapentin and lyrica, as she has had negative reactions (mood) to both in the past. She would rather tolerate the irritation at this time. She is worried that her feet have " been pressed up against the end of the bed, despite being well-placed normally at the head of the bed. Has been somewhat perseveratory about her foot being up against the end of bed after daughters mentioned it yesterday; she is very trepidatious with any movement of the right foot. A bed extension may be of help.   Day 4 of antibiotics for UTI; will plan on extended course given confusion, up to 5 days.   Tentative plan for discharge to SNF tomorrow, if condition continues to trend positively.      Review of Systems   Constitutional:  Negative for chills and fever. Weight loss: significant planned weight loss over last few years.  HENT: Negative.     Eyes: Negative.    Respiratory:  Negative for cough, sputum production and shortness of breath.    Cardiovascular:  Negative for chest pain and palpitations.   Gastrointestinal:  Negative for diarrhea, nausea and vomiting.   Genitourinary:  Negative for dysuria.   Skin:  Negative for itching.   Neurological:  Positive for tremors (BUE), sensory change (chronic BLE peripheral neuropathy) and weakness. Negative for dizziness, tingling, speech change, seizures, loss of consciousness and headaches.   Psychiatric/Behavioral:  Hallucinations: none reported this AM. The patient is nervous/anxious. Insomnia: improved. good sleep again last night.     Objective:     Vitals:    08/20/24 0731   BP: (!) 142/65   Pulse: 89   Resp: 18   Temp: 97.6 °F (36.4 °C)     Physical Exam  Constitutional:       General: She is not in acute distress.     Appearance: Normal appearance. She is not toxic-appearing.   HENT:      Mouth/Throat:      Comments: Tongue bright pink. Patient has not had much to eat or drink over the last 2-3 days.  Cardiovascular:      Rate and Rhythm: Normal rate and regular rhythm.      Pulses: Normal pulses.      Heart sounds: Normal heart sounds.   Pulmonary:      Effort: Pulmonary effort is normal. No respiratory distress.      Breath sounds: Normal breath sounds.    Abdominal:      General: Bowel sounds are normal. There is no distension.      Tenderness: There is no abdominal tenderness.   Musculoskeletal:         General: Signs of injury (s/p R ankle ORIF. Pelvic reing fx) present.   Skin:     Capillary Refill: Capillary refill takes less than 2 seconds.      Coloration: Skin is jaundiced.   Neurological:      Mental Status: She is alert and oriented to person, place, and time. Mental status is at baseline.      Gait: Gait abnormal (NWB RLE).      Comments: BUE tremor, worst at rest. No past-pointing, intention tremor, or significant ataxia on FTN testing.   Neuropathic pain with pressure R foot.    Psychiatric:         Mood and Affect: Mood normal.         Behavior: Behavior normal.         Thought Content: Thought content normal.      Comments: Oriented to person, place, and situation. She is essentially back to baseline per daughter.        Recent Labs   Lab 08/20/24  0439   CALCIUM 8.0*   ALBUMIN 1.4*   PROT 5.0*      K 3.9   CO2 23      BUN 40*   CREATININE 1.7*   ALKPHOS 140*   ALT 15   AST 45*   BILITOT 0.3     eGFR 31.5  Glucose 64  POCT 73, 112 on dexcom this afternoon.     Recent Labs   Lab 08/20/24  0439   WBC 7.93   RBC 3.26*   HGB 9.4*   HCT 29.4*      MCV 90   MCH 28.8   MCHC 32.0     Assessment/Plan:      Ms. Lorena Contreras is a very pleasant 73yoF who presents after mechanical fall from standing height, with resulting R ankle trimalleolar fracture and R pelvic ring fracture. S/p R ankle ORIF 8/14, s/p pelvic fixation 8/16.      **R Ankle Pilon Fracture  [CT: There is acute appearing fracture involving the distal aspect of the fibula. There is comminuted fracture of the medial malleolus.]  S/p ORIF with post-op splint. Plan to change to short-leg fiberglass cast prior to d/c with consideration for CAM walker at 3wk post-op.   - Restart diet post op (Diabetic + Renal diet). No restrictions on swallow per speech therapy.   - NWB RLE x  10wk  - Pain management with multimodals and narcotics PRN for breakthrough pain  - Benoit removed today and pure wick placed instead   > minimal urine output: retention on bladder scan and 196cc on straight cath this am, 650cc on straight cath this afternoon. Will continue to monitor and reevaluate.   - PT/OT .    > recommendations: High Intensity therapy as of 8/19, may progress.               > Ortho restrictions: NWB RLE  - Patient is not anticoagulated on baseline; She is on antiplatelets.  - DVT Prophylaxis:   > Eliquis x 10 weeks post op     ** R Pelvic Ring Fracture  [CT: Acute traumatic fractures involving the right iliac wing, the anterior pillar of the right acetabulum and the lateral most aspect of the right superior ramus, and the right inferior pubic ramus.]  S/p surgical fixation 8/16.   - f/u ortho recs  - pain management with multimodals and narcotics PRN for breakthrough pain   - eliquis x 10 weeks post op for DVT prophylaxis (patient will be BWB RLE for 10 weeks per ankle protocols).     Delirium  Present immediately post op in PACU and has been on/off with severe anxiety. Over the weekend, patient was in full delirium, hallucinating and talking to the wall and seeing people/babies. Minimally responsive to zyprexa x2, IV benadryl x1, and small dose ativan on 8/17 to try to calm and help sleep. Acute delirium likely 2/2 her known anxiety, no sleep in >48hrs, and 2 UTIs currently undergoing treatment. Delirium precautions in place. Prioritizing chemical restraints first, physical only if chemical fail. Psych consulted to assist with evaluation of delirium and recommendations for sleep, delirium PRNs. Delirium now improved 8/19, 8/20 after institution of recommendations and good sleep. Will continue psych rx at SNF per consult recommendations.   - f/u Psych recommendations, with thanks   > Depakote 250mg IV TID   > Seroquel   > Melatonin  To be continued at SNF.     Generalized Anxiety Disorder.    Discussed multiple ideas of decreased anxiety and stress relief. Will try to coordinate with daughters to find a way of bringing some crafts in from home for her to work on, as this help distract her and keep her calm. Emphasized importance of taking things one day at a time and prioritizing safety.   - Ativan 0.5mg PO PRN (home dose)  - continue home fluoxetine    UTI  UA equivocal for UTI vs fallout from CKD, and antibiotics were held until cultures resulted. Culture positive for E coli, E faecalis. Antibiotic treatment initiated 8/17 with positive cultures.   - continue Vancomycin, re-dosed by pharmacy    Constipation  BM today after suppository 8/19. Patient reporting painful gas.      Hypoalbuminemia  Albumin 1.4 today, down from 1.5 yesterday. Patient has not eaten much in the last 48-72 hours, but is increasing PO today.   - Continue boost (glucose control)    Hypocalcemia  Ca 8.0 today. Likely due to poor intake and hypoalbuminemia as patient has not eaten much in the last 48-72 hours. Replaced starting 8/17. Will continue to replete and monitor closely.   - daily CMP  - replace    Hypomagnesemia   Mg 1.5 on admit. Repleted overnight and increased to 2.2. 2.1 today. Will continue to monitor.      Anemia  Hgb 9.4, stable. Hct 29.4. Transfused 2 units after Hgb was 6.8 on 8/17. Anemia likely associated with recent trauma and subsequent surgery which was quite significant with expected blood loss. Currently asymptomatic. Taking vitamin supplementation at home including iron, folic acid and B12. MCV WNL (85). Will monitor closely with daily CBC.   - daily CBC  - Continue home vitamin supplementation  - Repeat transfusion if H/H drops below 7/21, or patient becomes hemodynamically unstable, or symptomatic.      T2DM  Most Recent A1C 8.2 (July '24). Home regimen 12 units lantus daily and 20 units novolog tid with meals. Patient has not been eating regular meals lately due to delirium. Glucose 112 per Dexcom  after breakfast this AM, 64 on pre-breakfast CMP. Will need to continue titrating glucose as patient's eating/sugars fluctuate.   - hold home antihyperglycemics while in hospital  - Hold novolog with meals, as patient's sugars have been low. Goal 120-180.   - lantus 24 units nightly  - hold ozempic while inpatient due to risk of gastric retention with surgery; patient is not eating much anyway and we would not want to further squelch hunger.      Peripheral Neuropathy  Chronic. 2/2 T2DM. No current complaints. Patient states she is not taking gabapentin for this at home; will be holding gabapentin in case it is associated with tremors.        CKD-4  eGFR 31.5, (July baseline 27.5). Cr back down to 1.7, (July baseline of 1.9). Labs on admit were consistant with July frantz. Likely acute mild KYA on chronic CKD4, 2/2 NPO status and surgery.  - gentle IV fluids  - encourage PO rehydration.  - monitor I&Os  - avoid nephrotoxic medications where possible  - daily CMP     Bicarb 23, continuing to trend up from 17 on admit. Chronically low per chart review. Likely 2/2 decreased kidney function. Not initially on replacement. Data shows that low bicarb can worsen kidney function and hasten progression of CKD. Started on supplementation inpatient, to be continued long term.   - Daily CMP  - sodium bicarbonate 650mg PO BID, to be continued long term. May adjust dose as needed.      HTN  Most recent blood pressures reviewed. She has been hypertensive since admission.   - continue home hydralazine  - continue home metoprolol 25mg  - continue imdur (increased 8/18)    Transaminitis  Mildly elevated AST/ALT: 45/15 down from 50/9 yesterday.  - continue to hold statin.      HLD  - HOLD home atorvastatin in setting of transaminitis    Heavenly Yeboah, MS3  [facility] - Surgery

## 2024-08-20 NOTE — PROGRESS NOTES
Nurses Note -- 4 Eyes      8/20/2024   9:42 AM      Skin assessed during: Q Shift Change      [x] No Altered Skin Integrity Present    []Prevention Measures Documented      [] Yes- Altered Skin Integrity Present or Discovered   [] LDA Added if Not in Epic (Describe Wound)   [] New Altered Skin Integrity was Present on Admit and Documented in LDA   [] Wound Image Taken    Wound Care Consulted? No    Attending Nurse:  Kelsi Shannon RN/Staff Member:  Susie BLANCO

## 2024-08-20 NOTE — PT/OT/SLP PROGRESS
"Occupational Therapy   Co-Treatment  co-treatment performed this date due to need for 2 skilled therapists in order to ensure pt safety, accomodate for pt activity tolerance, and maximize functional potential    Name: Lorena Contreras  MRN: [id number]  Admitting Diagnosis:  Closed right pilon fracture  4 Days Post-Op    Recommendations:     Discharge Recommendations: High Intensity Therapy  Discharge Equipment Recommendations:  wheelchair  Barriers to discharge:       Assessment:     Lorena Contreras is a 73 y.o. female with a medical diagnosis of Closed right pilon fracture.  She presents with. Performance deficits affecting function are weakness, impaired endurance, impaired self care skills, impaired functional mobility, gait instability, impaired balance, pain, decreased safety awareness, decreased lower extremity function, decreased ROM, orthopedic precautions. Pt tolerated tx well. Required encouragement for functional mobility and then able to perform with good effort. She performed ADLs seated EOB and then x2 STS transfers. Pt will continue to benefit from skilled OT services to address impairments listed above to maximize independence with ADLs and functional mobility to ensure safe return to PLOF.     Rehab Prognosis:  Good; patient would benefit from acute skilled OT services to address these deficits and reach maximum level of function.       Plan:     Patient to be seen 4 x/week to address the above listed problems via self-care/home management, therapeutic activities, therapeutic exercises, neuromuscular re-education  Plan of Care Expires: 09/15/24  Plan of Care Reviewed with: patient    Subjective     Chief Complaint: pain  Patient/Family Comments/goals: "I can try"  Pain/Comfort:  Pain Rating 1: 6/10  Location - Side 1: Right  Location - Orientation 1: generalized  Location 1: ankle  Pain Addressed 1: Reposition, Distraction, Cessation of Activity  Pain Rating Post-Intervention 1: other (see " "comments) (unrated)    Objective:     Communicated with: RN prior to session.  Patient found HOB elevated with peripheral IV, PureWick, telemetry upon OT entry to room.    General Precautions: Standard, fall, aspiration    Orthopedic Precautions:RLE non weight bearing  Braces: N/A  Respiratory Status: Room air     Occupational Performance:     Bed Mobility:    Patient completed Supine to Sit with moderate assistance for RLE mgmt and trunk  Patient completed Sit to Supine with moderate assistance for BLE     Functional Mobility/Transfers:  Patient completed Sit <> Stand Transfer with moderate assistance, total assistance, and of 2 persons  with  rolling walker from EOB with bed slightly raised  1st attempt- Total A x 2 persons ~10% hip clearance. Pt stating "I can't, it hurts!" (Referring to L hip) Unable to bear much weight through LLE or extend knee to come upright   2nd attempt- Mod A x 2 persons for upright stand with LLE blocked to promote knee extension   Functional Mobility: pt able to maintain static stand for ~1min with Mod A x 2 persons and RW. Cues needed to  RLE 2/2 NWB     Activities of Daily Living:  Grooming: stand by assistance oral and facial hygiene sitting EOB  Upper Body Dressing: minimum assistance donning/doffing back gown  Lower Body Dressing: total assistance donning L sock      AMPAC 6 Click ADL: 15    Treatment & Education:  Pt educated on   Role of OT and OT POC  Safe transfer techniques and proper body mechanics for fall prevention and improved independence with functional transfers  Importance of OOB activities to increase endurance and tolerance for increased participation in daily ADLs    Utilizing the call bell to request for assistance with all functional mobility to ensure safety during hospital stay.    Pt verbalized understanding and all questions were addressed within the scope of OT.     Patient left HOB elevated with all lines intact and call button in reach    GOALS: "   Multidisciplinary Problems       Occupational Therapy Goals          Problem: Occupational Therapy    Goal Priority Disciplines Outcome Interventions   Occupational Therapy Goal     OT, PT/OT Progressing    Description: Goals to be met by: 9/15/2024     Patient will increase functional independence with ADLs by performing:    UE Dressing with Supervision.  LE Dressing with Minimal Assistance.  Grooming while standing at sink with Minimal Assistance.  Toileting from bedside commode with Moderate Assistance for hygiene and clothing management.   Step transfer with Moderate Assistance  Toilet transfer to bedside commode with Minimal Assistance.                         Time Tracking:     OT Date of Treatment: 08/20/24  OT Start Time: 1055  OT Stop Time: 1122  OT Total Time (min): 27 min    Billable Minutes:Self Care/Home Management 15  Therapeutic Activity 12    OT/ERIKA: OT          8/20/2024

## 2024-08-20 NOTE — PT/OT/SLP PROGRESS
"Speech Language Pathology Treatment/ Discharge    Patient Name:  Lorena Contreras   MRN:  [id number]  Admitting Diagnosis: Closed right pilon fracture    Recommendations:                 General Recommendations:  Follow-up not indicated  Diet recommendations:  Regular Diet - IDDSI Level 7, Liquid Diet Level: Thin liquids - IDDSI Level 0   Aspiration Precautions: HOB to 90 degrees, Meds whole 1 at a time, and Standard aspiration precautions   General Precautions: Standard, fall, aspiration  Communication strategies:  none    Assessment:     Lorena Contreras is a 73 y.o. female with functional oropharyngeal swallow with no concerns for aspiration.    Subjective     "I'm having ribs tonight."  Patient goals: rehab     Pain/Comfort:  Pain Rating 1: 5/10  Location 1: knee  Pain Rating Post-Intervention 1: 5/10    Respiratory Status: Room air    Objective:     Has the patient been evaluated by SLP for swallowing?   Yes  Keep patient NPO? No   Current Respiratory Status:        Pt lying awake in bed upon arrival. Pt's HOB raised to 90 degrees. Pt reports pain of throat and tongue has resolved unless drinking acidic/citrus drinks. Pt reports little irritation and pointing to inside of upper lip. Pt self-administered trails of thin liquids and solids. Oral acceptance and containment good. Mastication good. No overt s/s of aspiration appreciated. Vocal quality remained clear. SLP provided education regarding SLP role, aspiration precautions, and overall impressions and recommendations. Pt expressed understanding and all questions were answered.     Goals:   Multidisciplinary Problems       SLP Goals          Problem: SLP    Goal Priority Disciplines Outcome   SLP Goal     SLP Progressing   Description: Speech Language Pathology Goals  Goals expected to be met by 9/2  1. Pt will tolerate regular and thin liquid diet with no concerns of aspiration                             Plan:     Patient to be seen:  3 x/week   Plan " of Care expires:  09/16/24  Plan of Care reviewed with:  patient   SLP Follow-Up:  No       Discharge recommendations:  no needs  Barriers to Discharge:  None    Time Tracking:     SLP Treatment Date:   08/20/24  Speech Start Time:  1004  Speech Stop Time:  1012     Speech Total Time (min):  8 min    Billable Minutes: Treatment Swallowing Dysfunction 8    08/20/2024

## 2024-08-20 NOTE — PLAN OF CARE
"   Skilled nursing facility ORDERS    08/21/2024  [facility]  REUBEN CHARLES - SURGERY  [address] [address]  Dept: [phone]  Loc: [phone]     Admit to skilled nursing facility:      Diagnoses:  Active Hospital Problems    Diagnosis  POA    *Closed right pilon fracture [S82.871A]  Yes    Delirium [R41.0]  No    Constipation [K59.00]  No    Abdominal pain [R10.9]  Yes    Hypocalcemia [E83.51]  No    Hypoalbuminemia [E88.09]  Yes    Tremor [R25.1]  Yes    Metabolic acidosis [E87.20]  Yes    Closed fracture of right iliac wing [S32.301A]  Yes    Multiple fractures of pelvis [S32.82XA]  Yes    Closed displaced fracture of right acetabulum [S32.401A]  Yes    Transaminitis [R74.01]  Yes    Closed fracture of superior and inferior rami of right pubis [S32.511A]  Yes    Acute blood loss anemia [D62]  Yes    Acute cystitis [N30.00]  Yes    KYA (acute kidney injury) [N17.9]  Yes    Chronic diastolic heart failure [I50.32]  Yes     Chronic     Noted on echo 11/18/2019:  Grade II (moderate) left ventricular diastolic dysfunction consistent with pseudonormalization         Stage 3a chronic kidney disease [N18.31]  Yes     Chronic    Hypomagnesemia [E83.42]  Yes    Coronary artery disease of native artery of native heart with stable angina pectoris [I25.118]  Yes     March 29, 2019:  [facility] and PCI of RCA -  "Patient has serial mid 99% eccentric lesions consistent with plaque rupture site and abnormal EKG findings.... We initially pre-dilated with 3.0 balloon.  We placed a 3.0 by 12 mm resolute kenya stent in the midportion of the vessel.  We overlapped that with a 3.5 x 15 mm resolute kenya stent in the more proximal portion mid RCA.  Good result was achieved with MADINA 3 flow through the vessel.  Lad has a mid 90% stenosis and the left circumflex as well as the long 95% lesion as well.   Cardiac catheterization 01/08/2020:  1.  Successful PCI of proximal ramus with drug-eluting stent x1 " (2.0 x 6 mm).  IVUS guidance was utilized for this PCI.  Post PCI IVUS demonstrated well-opposed and expanded stent.  MADINA 3 flow pre and post PCI.   2.  Successful PCI of circumflex with drug-eluting stent x1 (2.25 x 22 mm).  MADINA 3 flow pre and post PCI         Type 2 diabetes mellitus with stage 3 chronic kidney disease, with long-term current use of insulin [E11.22, N18.30, Z79.4]  Not Applicable     Chronic    Mixed hyperlipidemia [E78.2]  Yes    Primary hypertension [I10]  Yes    Anxiety [F41.9]  Yes     Chronic      Resolved Hospital Problems    Diagnosis Date Resolved POA    Closed fracture of right tibia and fibula [S82.201A, S82.401A] 08/14/2024 Yes       Patient is homebound due to:  Closed right pilon fracture    Allergies:  Review of patient's allergies indicates:   Allergen Reactions    Novolin 70/30 (semi-synthetic) Nausea And Vomiting     Severe vomiting on Relion 70/30    Sulfa (sulfonamide antibiotics) Anaphylaxis    Talwin [pentazocine lactate] Anaphylaxis    Victoza [liraglutide] Nausea And Vomiting    Glipizide Nausea Only    Citric acid     Codeine     Influenza virus vaccines Hives    Iodine and iodide containing products Hives    Levetiracetam Itching    Neurontin [gabapentin]      Possible associated myoclonic jerk    Rituxan [rituximab] Hives    Zoloft [sertraline] Nausea And Vomiting       Vitals:  Every shift    Diet: diabetic diet: 2000 calorie ++  house supplement TID with meals    Activities:   Nonweightbearing right lower extremity x 10 weeks postop.        We will remove splint, change dressing, and place a well-padded short-leg fiberglass cast prior to hospital discharge.  After incision healed, and sutures removed, likely 3 weeks postop, consider placement into a cam boot versus continuing the cast, depending on patient preference, inability to place an remove Cam boot     Goals of Care Treatment Preferences:  Code Status: Full Code    Wound care- keep in splint to RLE until ortho  follow up. Keep bandages to right pelvis area intact until ortho follow up. Do not get wt or immerse in water. Reinforce PRn if fraying.          Nursing Precautions:  Fall and Pressure ulcer prevention    Consults:   PT to evaluate and treat- 5 times a week and OT to evaluate and treat- 5 times a week     Miscellaneous Care: Routine Skin for Bedridden Patients:  Apply moisture barrier cream to all  Diabetes Care: Diabetes: Check blood sugar. Fingerstick blood sugar AC and HS  Sliding Scale/Hypoglycemia Protocol: **MODERATE CORRECTION DOSE**  Blood Glucose  mg/dL    Pre-meal     Bedtime  151-200  2 units        1 unit  201-250   4 units       2 units   251-300  6 units        3 units   301-350   8 units       4 units   >350      10 units        5 units  **CALL MD for BG >350**                   Diabetes Care:  SN to perform and educate Diabetic management with blood glucose monitoring: and Fingerstick blood sugar AC and HS    Benoit- empty q shift. Remove on 8/25 for voiding trial.       Medications: Discontinue all previous medication orders, if any. See new list below.     Medication List        START taking these medications      acetaminophen 500 MG tablet  Commonly known as: TYLENOL  Take 1 tablet (500 mg total) by mouth every 8 (eight) hours.     aluminum & magnesium hydroxide-simethicone 400-400-40 mg/5 mL suspension  Commonly known as: MYLANTA MAX STRENGTH  Take 30 mLs by mouth every 6 (six) hours as needed for Indigestion.     apixaban 2.5 mg Tab  Commonly known as: ELIQUIS  Take 1 tablet (2.5 mg total) by mouth 2 (two) times daily. End date October 26th 2024     CALCIUM ANTACID 300 mg (750 mg) chewable tablet  Generic drug: calcium carbonate  Take 1 750 mg tablet daily     divalproex 125 mg capsule  Commonly known as: DEPAKOTE SPRINKLES  Take 2 capsules (250 mg total) by mouth every 8 (eight) hours. Per psych MD can stop while at SNF if doing well without any altered mental status while there but continue  on discharge from hospital for now     hydrOXYzine HCL 25 MG tablet  Commonly known as: ATARAX  Take 1 tablet (25 mg total) by mouth 3 (three) times daily as needed for Itching.     melatonin 3 mg tablet  Commonly known as: MELATIN  Take 2 tablets (6 mg total) by mouth nightly.  Replaces: melatonin 10 mg Cap     methocarbamoL 500 MG Tab  Commonly known as: ROBAXIN  Take 1 tablet (500 mg total) by mouth 4 (four) times daily.     ondansetron 8 MG Tbdl  Commonly known as: ZOFRAN-ODT  Take 1 tablet (8 mg total) by mouth every 8 (eight) hours as needed (nausea).     * oxyCODONE 5 MG immediate release tablet  Commonly known as: ROXICODONE  Take 1 tablet (5 mg total) by mouth every 6 (six) hours as needed (pain scale 4-6).     * oxyCODONE 10 mg Tab immediate release tablet  Commonly known as: ROXICODONE  Take 1 tablet (10 mg total) by mouth every 6 (six) hours as needed (pain scale 7-10).     polyethylene glycol 17 gram Pwpk  Commonly known as: GLYCOLAX  Take 17 g by mouth daily as needed for Constipation.     QUEtiapine 50 MG tablet  Commonly known as: SEROQUEL  Take 1 tablet (50 mg total) by mouth every evening.           * This list has 2 medication(s) that are the same as other medications prescribed for you. Read the directions carefully, and ask your doctor or other care provider to review them with you.                CHANGE how you take these medications      aspirin 81 MG Chew  Take 1 tablet (81 mg total) by mouth once daily. Hold to avoid bleed risk on triple therapy and then resume on oct 27 once eliquis stops on oct 26th  What changed: additional instructions     insulin aspart U-100 100 unit/mL (3 mL) Inpn pen  Commonly known as: NovoLOG  Inject 0-10 Units into the skin before meals and at bedtime as needed (Hyperglycemia).  What changed:   how much to take  when to take this  reasons to take this     insulin glargine U-100 (Lantus) 100 unit/mL (3 mL) Inpn pen  Inject 19 Units into the skin every  "evening.  What changed: how much to take     isosorbide mononitrate 60 MG 24 hr tablet  Commonly known as: IMDUR  Take 1 tablet (60 mg total) by mouth once daily.  What changed:   medication strength  how much to take     * OZEMPIC 1 mg/dose (4 mg/3 mL)  Generic drug: semaglutide  Inject 1 mg into the skin every 7 days. HOLD while at [facility]  What changed: additional instructions     * OZEMPIC 0.25 mg or 0.5 mg (2 mg/3 mL) pen injector  Generic drug: semaglutide  Inject 0.5 mg once weekly thereafter    HOLD at [facility]  What changed: additional instructions           * This list has 2 medication(s) that are the same as other medications prescribed for you. Read the directions carefully, and ask your doctor or other care provider to review them with you.                CONTINUE taking these medications      albuterol 90 mcg/actuation inhaler  Commonly known as: PROVENTIL/VENTOLIN HFA  Inhale 2 puffs into the lungs every 6 (six) hours as needed for Wheezing or Shortness of Breath.     atorvastatin 80 MG tablet  Commonly known as: LIPITOR  Take 1 tablet by mouth in the evening     DEXCOM G7  Misc  Generic drug: blood-glucose meter,continuous  Use 1  to track blood glucose, ICD10: E11.65     DEXCOM G7 SENSOR Samina  Generic drug: blood-glucose sensor  Use 1 sensor every 10 days to track blood glucose, ICD10: E11.65, okay with 90 day supply if possible     FLUoxetine 40 MG capsule  Take 1 capsule (40 mg total) by mouth once daily.     metoprolol succinate 25 MG 24 hr tablet  Commonly known as: TOPROL-XL  Take 1 tablet (25 mg total) by mouth once daily.     pantoprazole 40 MG tablet  Commonly known as: PROTONIX  Take 1 tablet (40 mg total) by mouth once daily.     pen needle, diabetic 32 gauge x 5/32" Ndle  Commonly known as: BD ULTRA-FINE SAGAR PEN NEEDLE  One pen needle use with insulin pen 4 times a day.  ICD-10: E11.9     ticagrelor 90 mg tablet  Commonly known as: BRILINTA  Take 1 tablet (90 mg total) by mouth 2 " (two) times daily.            STOP taking these medications      ASCORBIC ACID (VITAMIN C) ORAL     CENTRUM COMPLETE ORAL     cholecalciferol (vitamin D3) 50 mcg (2,000 unit) Cap capsule  Commonly known as: VITAMIN D3     cyanocobalamin 1000 MCG tablet  Commonly known as: VITAMIN B-12     DIGESTIVE ADVANTAGE IMMUNE ORAL     EPINEPHrine 0.3 mg/0.3 mL Atin  Commonly known as: EPIPEN     ESOMEPRAZOLE MAGNESIUM ORAL     gabapentin 300 MG capsule  Commonly known as: NEURONTIN     hydrALAZINE 50 MG tablet  Commonly known as: APRESOLINE     LIDOcaine 5 %  Commonly known as: LIDODERM     LORazepam 1 MG tablet  Commonly known as: ATIVAN     magnesium oxide 400 mg (241.3 mg magnesium) tablet  Commonly known as: MAG-OX     melatonin 10 mg Cap  Replaced by: melatonin 3 mg tablet     vitamin E 1000 UNIT capsule     JULIANE TOP                  _________________________________  Rupal Ochoa MD  08/21/2024

## 2024-08-20 NOTE — ASSESSMENT & PLAN NOTE
Hard to titrate at baseline and had to have doctors adjust a lot, runs very high at times up to 1100 she said once even.   Titrate 8/15, running higher as didn't get long acting yesterday and adjusted today and will adjsut further. Has some allergies to some insulins. On tresiba at home.  - low dose SSI  - diabetic diet  - POCT checks  Baseline runs very high per dr schmidt notes she sees with endocrine outpatient. Baseline average 253 glucose on dexcom he reports. Dexcom removed for OR. On drip in OR wasn't given llantus this AM though. Will incease dose to giev now in pacu and inc to moderate sliding scale and inc novolog and will likely titrate here as not contolled at home.  Glucose in 100s on 8/17 after 28 U lantus last night and moderate slidign scale. Isnt eating much on ful liquids now, so hold novolog unless glucose is >160 with meals and eating at least half of meal if is in 100s but she tends to run higher even when NPo yesterday was 300s in pacu so titrate further based on trending today and will watch closely. Has had no hypoglycemia on dexcom per above.  Glucose better but not eating, just starting to eat 8/18 so will likely need to titrate more  Improved 8/19, glucose lwoer 100s, holding novolgo with breakfast as didn't eat too much, nursing comm to hold novolog if eats less than half meal and glucose under 160 and titrate down dose also while is not eating as much   Glucose 60s this am 8/20 so stop novolog for now and will add back pending trends with sliding scale prn, dec levemir a few u tonight, eating better so likely will trend back up

## 2024-08-20 NOTE — PROGRESS NOTES
"CONSULTATION LIAISON PSYCHIATRY PROGRESS NOTE    Patient Name: Lorena Contreras  MRN: [id number]  Patient Class: IP- Inpatient  Admission Date: 8/13/2024  Attending Physician: Rupal Ochoa MD      SUBJECTIVE:   Lorena Contreras is a 73 y.o. female with past psychiatric history of anxiety & past pertinent medical history of fibromyalgia, HTN, HLD, CAD, MI, CVA, and T2DM presents to the ED/admitted to the hospital for Closed right pilon fracture after a fall     Psychiatry consulted for "Hyperactive delirium with high anxiety baseline with hallucinations in restraints no sleep >48 hours, failing prns, uti, recs for other PrN management in interim for assistance to avoid adverse events"       No PRNs overnight.    Today, pt sitting up in bed. Alert and oriented x4. No longer in restraints. States mood is "great." Reports sleeping "wonderfully" last night. No longer having hallucinations. Reports appetite is "fine" just wishes food was better. Denies SI/HI. Medications working well, no side effects.       OBJECTIVE:    Mental Status Exam:  General Appearance: appears stated age, well developed and nourished, adequately groomed and appropriately dressed, in no acute distress  Behavior: normal; cooperative; reasonably friendly, pleasant, and polite; appropriate eye-contact; under good behavioral control  Involuntary Movements and Motor Activity: no abnormal involuntary movements noted; no tics, no tremors, no akathisia, no dystonia, no evidence of tardive dyskinesia; no psychomotor agitation or retardation  Gait and Station: unable to assess - patient lying down or seated  Speech and Language: intact; normal rate, rhythm, volume, tone, and pitch; conversational, spontaneous, and coherent; speaks and understands English proficiently and fluently; repeats words and phrases, no word finding difficulties are noted  Mood: "great"  Affect: normal, euthymic, reactive, full-range, mood-congruent, appropriate to " situation and context  Thought Process and Associations: intact; linear, goal-directed, organized, and logical; no loosening of associations noted  Thought Content and Perceptions:: no suicidal or homicidal ideation, no auditory or visual hallucinations, no paranoid ideation, no ideas of reference, no evidence of delusions or psychosis  Sensorium and Orientation: intact; alert with clear sensorium; oriented fully to person, place, time and situation  Recent and Remote Memory: grossly intact, able to recall relevant and salient information from the recent and remote past  Attention and Concentration: grossly intact, attentive to the conversation and not readily distractible  Fund of Knowledge: grossly intact, used appropriate vocabulary and demonstrated an awareness of current events, consistent with educational level achieved  Insight: intact, demonstrates awareness of illness and situation  Judgment: intact, behavior is adequate/appropriate to the circumstances, compliant with health provider's recommendations and instructions    CAM ICU positive? no      ASSESSMENT & RECOMMENDATIONS   Delirium due to another medical condition  Hx of anxiety      PSYCH MEDICATIONS  Continue Depacon 250 mg IV TID  Continue Seroquel 50 mg qhs   Schedule Melatonin 6 mg qhs   QTc 8/13: 504 ms. F/U repeat ECG   If no improvement in AMS, recommend pursuing encephalopathic work up w/ vitamin levels, TSH, infectious etiologies, etc       DELIRIUM  DELIRIUM BEHAVIOR MANAGEMENT  PLEASE utilize CHEMICAL restraints with PRN meds first for agitation. Minimize use of PHYSICAL restraints OR have periods of being out of physical restraints if possible.  Keep window shades open and room lit during day and room dim at night in order to promote normal sleep-wake cycles  Encourage family at bedside. Hall patient often to situation, location, date.  Continue to Limit or Discontinue use of Narcotics, Benzos and Anti-cholinergic medications as they may  worsen delirium.  Continue medical workup for causative etiology of Delirium.      OTHER PERTINENT DIAGNOSIS  UTI, UCx growing e. coli and e. faecalis, currently on antibiotics     Pelvis and right ankle fractures, managed surgically w/ daily opioids for pain control        RISK ASSESSMENT  NO NEED FOR PEC patient NOT in any imminent danger of hurting self or others and not gravely disabled.      FOLLOW UP  Will sign off. Please re-consult if any new issues arise     DISPOSITION - once medically cleared:    Defer to medical team       Please contact ON CALL psychiatry service (24/7) for any acute issues that may arise.    ASHLEY Buckner   Psychiatry  [facility]  8/20/2024 11:01 AM        --------------------------------------------------------------------------------------------------------------------------------------------------------------------------------------------------------------------------------------    CONTINUED OBJECTIVE clinical data & findings reviewed and noted for above decision making    Current Medications:   Scheduled Meds:    acetaminophen  500 mg Oral Q8H    aluminum & magnesium hydroxide-simethicone  30 mL Oral Q6H    apixaban  2.5 mg Oral BID    bisacodyL  10 mg Rectal Once    calcium carbonate  1,000 mg Oral Daily    cefTRIAXone (Rocephin) IV (PEDS and ADULTS)  1 g Intravenous Q24H    FLUoxetine  40 mg Oral Daily    insulin glargine U-100  24 Units Subcutaneous QHS    isosorbide mononitrate  60 mg Oral Daily    melatonin  6 mg Oral Nightly    methocarbamoL  500 mg Oral QID    metoprolol succinate  25 mg Oral Daily    pantoprazole  40 mg Oral Daily    QUEtiapine  50 mg Oral QHS    sodium bicarbonate  650 mg Oral BID    valproate sodium (DEPACON) IVPB  250 mg Intravenous Q8H    vancomycin (VANCOCIN) IV (PEDS and ADULTS)  500 mg Intravenous Once     PRN Meds:   Current Facility-Administered Medications:     0.9%  NaCl infusion (for blood administration), ,  Intravenous, Q24H PRN    0.9%  NaCl infusion (for blood administration), , Intravenous, Q24H PRN    albuterol-ipratropium, 3 mL, Nebulization, Q4H PRN    bisacodyL, 10 mg, Rectal, Daily PRN    calcium carbonate, 500 mg, Oral, TID PRN    dextrose 10%, 12.5 g, Intravenous, PRN    dextrose 10%, 12.5 g, Intravenous, PRN    dextrose 10%, 12.5 g, Intravenous, PRN    dextrose 10%, 25 g, Intravenous, PRN    dextrose 10%, 25 g, Intravenous, PRN    dextrose 10%, 25 g, Intravenous, PRN    glucagon (human recombinant), 1 mg, Intramuscular, PRN    glucagon (human recombinant), 1 mg, Intramuscular, PRN    glucose, 16 g, Oral, PRN    glucose, 16 g, Oral, PRN    glucose, 24 g, Oral, PRN    glucose, 24 g, Oral, PRN    hydrOXYzine HCL, 25 mg, Oral, TID PRN    insulin aspart U-100, 0-10 Units, Subcutaneous, QID (AC + HS) PRN    morphine, 2 mg, Intravenous, Q6H PRN    ondansetron, 8 mg, Oral, Q8H PRN    oxyCODONE, 5 mg, Oral, Q6H PRN    oxyCODONE, 10 mg, Oral, Q6H PRN    polyethylene glycol, 17 g, Oral, Daily PRN    promethazine, 25 mg, Oral, Q6H PRN    sodium chloride 0.9%, 10 mL, Intravenous, PRN    Pharmacy to dose Vancomycin consult, , , Once **AND** vancomycin - pharmacy to dose, , Intravenous, pharmacy to manage frequency    Allergies:   Review of patient's allergies indicates:   Allergen Reactions    Novolin 70/30 (semi-synthetic) Nausea And Vomiting     Severe vomiting on Relion 70/30    Sulfa (sulfonamide antibiotics) Anaphylaxis    Talwin [pentazocine lactate] Anaphylaxis    Victoza [liraglutide] Nausea And Vomiting    Glipizide Nausea Only    Citric acid     Codeine     Influenza virus vaccines Hives    Iodine and iodide containing products Hives    Levetiracetam Itching    Neurontin [gabapentin]      Possible associated myoclonic jerk    Rituxan [rituximab] Hives    Zoloft [sertraline] Nausea And Vomiting       Vitals  Vitals:    08/20/24 0731   BP: (!) 142/65   Pulse: 89   Resp: 18   Temp: 97.6 °F (36.4 °C)        Labs/Imaging/Studies:  Recent Results (from the past 24 hour(s))   POCT glucose    Collection Time: 08/19/24 12:43 PM   Result Value Ref Range    POCT Glucose 211 (H) 70 - 110 mg/dL   POCT glucose    Collection Time: 08/19/24  5:32 PM   Result Value Ref Range    POCT Glucose 113 (H) 70 - 110 mg/dL   POCT glucose    Collection Time: 08/19/24  8:40 PM   Result Value Ref Range    POCT Glucose 114 (H) 70 - 110 mg/dL   CBC Auto Differential    Collection Time: 08/20/24  4:39 AM   Result Value Ref Range    WBC 7.93 3.90 - 12.70 K/uL    RBC 3.26 (L) 4.00 - 5.40 M/uL    Hemoglobin 9.4 (L) 12.0 - 16.0 g/dL    Hematocrit 29.4 (L) 37.0 - 48.5 %    MCV 90 82 - 98 fL    MCH 28.8 27.0 - 31.0 pg    MCHC 32.0 32.0 - 36.0 g/dL    RDW 16.0 (H) 11.5 - 14.5 %    Platelets 174 150 - 450 K/uL    MPV 12.0 9.2 - 12.9 fL    Immature Granulocytes 0.6 (H) 0.0 - 0.5 %    Gran # (ANC) 5.1 1.8 - 7.7 K/uL    Immature Grans (Abs) 0.05 (H) 0.00 - 0.04 K/uL    Lymph # 1.7 1.0 - 4.8 K/uL    Mono # 0.7 0.3 - 1.0 K/uL    Eos # 0.3 0.0 - 0.5 K/uL    Baso # 0.05 0.00 - 0.20 K/uL    nRBC 0 0 /100 WBC    Gran % 64.7 38.0 - 73.0 %    Lymph % 20.8 18.0 - 48.0 %    Mono % 9.1 4.0 - 15.0 %    Eosinophil % 4.2 0.0 - 8.0 %    Basophil % 0.6 0.0 - 1.9 %    Differential Method Automated    Comprehensive Metabolic Panel    Collection Time: 08/20/24  4:39 AM   Result Value Ref Range    Sodium 137 136 - 145 mmol/L    Potassium 3.9 3.5 - 5.1 mmol/L    Chloride 108 95 - 110 mmol/L    CO2 23 23 - 29 mmol/L    Glucose 64 (L) 70 - 110 mg/dL    BUN 40 (H) 8 - 23 mg/dL    Creatinine 1.7 (H) 0.5 - 1.4 mg/dL    Calcium 8.0 (L) 8.7 - 10.5 mg/dL    Total Protein 5.0 (L) 6.0 - 8.4 g/dL    Albumin 1.4 (L) 3.5 - 5.2 g/dL    Total Bilirubin 0.3 0.1 - 1.0 mg/dL    Alkaline Phosphatase 140 (H) 55 - 135 U/L    AST 45 (H) 10 - 40 U/L    ALT 15 10 - 44 U/L    eGFR 31.5 (A) >60 mL/min/1.73 m^2    Anion Gap 6 (L) 8 - 16 mmol/L   Phosphorus    Collection Time: 08/20/24  4:39 AM    Result Value Ref Range    Phosphorus 3.9 2.7 - 4.5 mg/dL   Magnesium    Collection Time: 08/20/24  4:39 AM   Result Value Ref Range    Magnesium 2.1 1.6 - 2.6 mg/dL   Vancomycin, random    Collection Time: 08/20/24  4:39 AM   Result Value Ref Range    Vancomycin, Random 14.3 Not established ug/mL   POCT glucose    Collection Time: 08/20/24  7:37 AM   Result Value Ref Range    POCT Glucose 73 70 - 110 mg/dL     Imaging Results              CT Ankle (Including Hindfoot) Without Contrast Right (Final result)  Result time 08/14/24 04:49:46      Final result by Portillo Oquendo MD (08/14/24 04:49:46)                   Impression:      Near anatomic alignment of complex mildly displaced distal tibia fracture and mildly displaced distal fibular fracture.    Electronically signed by resident: Hira Patel  Date:    08/14/2024  Time:    03:56    Electronically signed by: Portillo Oquendo MD  Date:    08/14/2024  Time:    04:49               Narrative:    EXAMINATION:  CT ANKLE (INCLUDING HINDFOOT) WITHOUT CONTRAST RIGHT; CT 3D RENDERING WO INDEPENDENT WORKSTATION    CLINICAL HISTORY:  Fracture, ankle;; Fracture, ankle;per order request;    TECHNIQUE:  Axial 0.625-mm images of the right ankle obtained without intravenous contrast.  Data submitted for coronal and sagittal reformats.  3D reconstructed images were acquired by post processing on an independent workstation with concurrent supervision and archived for permanent record. 3D imaging of the right ankle was obtained for further evaluation and operative planning if needed.    COMPARISON:  Ankle radiograph 03/14/2024.    FINDINGS:  Bones are demineralized.  There is a cast in place.  There is mildly displaced comminuted distal tibial fracture and a minimally displaced distal fibular fracture with medial angulation of the fracture fragment.  Intra-articular extension fracture fragments which are mildly displaced involving the distal tibia near anatomic alignment of  fracture fragments.  Fractures involve the posterior and medial malleoli.  No dislocation.  There are a few foci of subcutaneous gas overlying the tibial fracture, possibly relating to trauma or reduction procedure though correlation is advised.  Dense calcific tendinosis of the posterior tibial tendon.  No full-thickness injury of the Achilles tendon.  Soft tissue edema about the ankle.  Prominent vascular calcifications noted.                                       CT 3D Rendering WO Independent Workstation (Final result)  Result time 08/14/24 04:49:46      Final result by Portillo Oquendo MD (08/14/24 04:49:46)                   Impression:      Near anatomic alignment of complex mildly displaced distal tibia fracture and mildly displaced distal fibular fracture.    Electronically signed by resident: Hira Patel  Date:    08/14/2024  Time:    03:56    Electronically signed by: Portillo Oquendo MD  Date:    08/14/2024  Time:    04:49               Narrative:    EXAMINATION:  CT ANKLE (INCLUDING HINDFOOT) WITHOUT CONTRAST RIGHT; CT 3D RENDERING WO INDEPENDENT WORKSTATION    CLINICAL HISTORY:  Fracture, ankle;; Fracture, ankle;per order request;    TECHNIQUE:  Axial 0.625-mm images of the right ankle obtained without intravenous contrast.  Data submitted for coronal and sagittal reformats.  3D reconstructed images were acquired by post processing on an independent workstation with concurrent supervision and archived for permanent record. 3D imaging of the right ankle was obtained for further evaluation and operative planning if needed.    COMPARISON:  Ankle radiograph 03/14/2024.    FINDINGS:  Bones are demineralized.  There is a cast in place.  There is mildly displaced comminuted distal tibial fracture and a minimally displaced distal fibular fracture with medial angulation of the fracture fragment.  Intra-articular extension fracture fragments which are mildly displaced involving the distal tibia near  anatomic alignment of fracture fragments.  Fractures involve the posterior and medial malleoli.  No dislocation.  There are a few foci of subcutaneous gas overlying the tibial fracture, possibly relating to trauma or reduction procedure though correlation is advised.  Dense calcific tendinosis of the posterior tibial tendon.  No full-thickness injury of the Achilles tendon.  Soft tissue edema about the ankle.  Prominent vascular calcifications noted.                                       X-Ray Ankle Complete Right (Final result)  Result time 08/14/24 03:44:09      Final result by Portillo Oquendo MD (08/14/24 03:44:09)                   Impression:      Similar alignment of distal tibia and distal fibula fractures post reduction.      Electronically signed by: Portillo Oquendo MD  Date:    08/14/2024  Time:    03:44               Narrative:    EXAMINATION:  XR ANKLE COMPLETE 3 VIEW RIGHT    CLINICAL HISTORY:  Post reduction;    TECHNIQUE:  AP, lateral, and oblique images of the right ankle were performed.    COMPARISON:  08/13/2024.    FINDINGS:  Exam quality is limited by suboptimal positioning and overlapping cast material.  Acute fractures of the distal tibia and distal fibula as seen previously.  Bones are demineralized.  No gross dislocation or significant worsening alignment of fracture fragments.  Soft tissue edema.                                       CT Pelvis Without Contrast (Final result)  Result time 08/13/24 20:58:12      Final result by Quiana Chawla MD (08/13/24 20:58:12)                   Impression:      Acute traumatic fractures involving the right iliac wing, the anterior pillar of the right acetabulum and the lateral most aspect of the right superior ramus, and the right inferior pubic ramus.  No hip dislocation.      Electronically signed by: Quiana Chawla  Date:    08/13/2024  Time:    20:58               Narrative:    EXAMINATION:  CT PELVIS WITHOUT CONTRAST    CLINICAL  HISTORY:  Pelvic trauma;    TECHNIQUE:  1.25 mm axial images were obtained through the pelvis from above the iliac crest through the upper femoral shafts.    COMPARISON:  Plain hip radiograph 08/13/2020 full    FINDINGS:  There is a minimally displaced fracture of the right iliac wing.  There are comminuted fractures involving the anterior pillar of the right acetabulum and the lateral most aspect of the right superior ramus.  There is comminuted and overlapping fracture of the right inferior pubic ramus.  The hips are not dislocated.  The SI joints are intact.  There are degenerative changes seen at the sacroiliac joints and the pubic symphysis.  Bones are osteopenic.  Incidental note is made of vascular calcifications and a small fat containing umbilical hernia.                                       CT Lumbar Spine Without Contrast (Final result)  Result time 08/13/24 20:31:47      Final result by Quiana Chawla MD (08/13/24 20:31:47)                   Impression:      No acute lumbar fracture.  Multilevel degenerative change greatest at L4/L5.    Small bilateral pleural effusions right greater than left.    Gallbladder sludge or gravel-like gallstones.      Electronically signed by: Quiana Chawla  Date:    08/13/2024  Time:    20:31               Narrative:    EXAMINATION:  CT OF THE LUMBAR SPINE WITHOUT    CLINICAL HISTORY:  Complaining of right hip pain secondary to fall from standing.    TECHNIQUE:  1.25 mm axial images were obtained through the lumbar spine. Coronal and sagittal reformatted images were provided.    COMPARISON:  None.    FINDINGS:  There is no lumbar vertebral body fracture.  There is grade 1 anterolisthesis of L4 on L5.  At L3/L4, there is moderate central canal narrowing secondary to the facet arthrosis and ligamentum flavum hypertrophy without significant foraminal stenosis.  At L4/L5, there is no stone significant central canal narrowing, but there is bilateral mild foraminal  narrowing.  Secondary to ligamentum flavum hypertrophy and facet arthrosis.  At L5/S1, there is a broad-based disc bulge without any significant central canal stenosis or foraminal stenosis.  There is small marginal osteophytes throughout.  Vacuum phenomena is seen at L4/L5 with mild intervertebral disc space narrowing.  Incidental note is made of small bilateral pleural effusions right greater than left and gallbladder sludge or gravel-like gallstones.                                       X-Ray Chest AP Portable (Final result)  Result time 08/13/24 17:49:32      Final result by Eddie Montilla MD (08/13/24 17:49:32)                   Impression:      1. Interstitial findings may reflect edema versus chronic change, no large focal consolidation.      Electronically signed by: Eddie Montilla MD  Date:    08/13/2024  Time:    17:49               Narrative:    EXAMINATION:  XR CHEST AP PORTABLE    CLINICAL HISTORY:  Chest Pain;    TECHNIQUE:  Single frontal view of the chest was performed.    COMPARISON:  11/03/2023    FINDINGS:  The cardiomediastinal silhouette is prominent, magnified by technique, similar to the previous exam noting calcification of the aorta..  There is no pleural effusion.  The trachea is midline.  The lungs are symmetrically expanded bilaterally with coarse interstitial attenuation in a central hilar distribution..  No large focal consolidation seen.  There is no pneumothorax.  The osseous structures are remarkable for degenerative changes..                                       X-Ray Ankle Complete Right (Final result)  Result time 08/13/24 17:50:39      Final result by Eddie Montilla MD (08/13/24 17:50:39)                   Impression:      1. Distal tibial and fibular fractures as described.      Electronically signed by: Eddie Montilla MD  Date:    08/13/2024  Time:    17:50               Narrative:    EXAMINATION:  XR ANKLE COMPLETE 3 VIEW RIGHT    CLINICAL HISTORY:  Unspecified  fall, initial encounter    TECHNIQUE:  AP, lateral, and oblique images of the right ankle were performed.    COMPARISON:  Radiograph of the foot 06/01/2021    FINDINGS:  Three views right ankle.    There is osteopenia.  There is acute appearing fracture involving the distal aspect of the fibula.  There is comminuted fracture of the medial malleolus.  The ankle mortise is intact.  There is edema about the ankle.  The posterior aspect of the tibia appears intact.  There are degenerative changes of the calcaneus.  There is vascular calcification.                                       X-Ray Hip 2 or 3 views Right with Pelvis when performed (Final result)  Result time 08/13/24 17:53:13      Final result by Eddie Montilla MD (08/13/24 17:53:13)                   Impression:      1. Fractures of the right inferior and superior pubic rami.  2. There is cortical irregularity involving the right iliac wing, concerning for additional fracture.      Electronically signed by: Eddie Montilla MD  Date:    08/13/2024  Time:    17:53               Narrative:    EXAMINATION:  XR HIP WITH PELVIS WHEN PERFORMED 2 OR 3 VIEWS RIGHT    CLINICAL HISTORY:  Unspecified fall, initial encounter    TECHNIQUE:  AP view of the pelvis and frog leg lateral view of the right hip were performed.    COMPARISON:  None    FINDINGS:  Three views right hip.    The bilateral sacroiliac joints are intact.  The pubic symphysis is intact.  There is osteopenia.  There are fractures of the right superior and inferior pubic rami.  The bilateral femoroacetabular joints are intact noting degenerative change.  There is fracture involving the right iliac wing.                                       X-Ray Knee 1 or 2 View Right (Final result)  Result time 08/13/24 17:53:54   Procedure changed from X-Ray Knee 3 View Right     Final result by Eddie Montilla MD (08/13/24 17:53:54)                   Impression:      1. No acute displaced fracture or dislocation  of the knee.      Electronically signed by: Eddie Montilla MD  Date:    08/13/2024  Time:    17:53               Narrative:    EXAMINATION:  XR KNEE 1 OR 2 VIEW RIGHT    CLINICAL HISTORY:  Pain;  Unspecified fall, initial encounter    TECHNIQUE:  AP and lateral views of the right knee were performed.    COMPARISON:  11/20/2014    FINDINGS:  Two views right knee.    There is osteopenia.  There are degenerative changes of the knee.  There is vascular calcification.  No large right knee joint effusion.

## 2024-08-20 NOTE — PROGRESS NOTES
[facility] Medicine  Progress Note    Patient Name: Lorena Contreras  MRN: [id number]  Patient Class: IP- Inpatient   Admission Date: 8/13/2024  Length of Stay: 6 days  Attending Physician: Rupal Ochoa MD  Primary Care Provider: Jorge Paris MD        Subjective:     Principal Problem:Closed right pilon fracture        HPI:  Lorena Contreras is 74 y/o with PMHx of DM2, Fibromyalgia, HTN, HLD, CVA, MI, and CAD presents to ED via EMS as a [place] transfer for a fall. Pt was walking into grocery store when she twisted her ankle and fell despite attempts to catch herself. Denies LOC or head trauma. Patient fell on her right side. Had immediate pain to her ankle and hip. Pt was unable to ambulate due to pain. Pain is worse in her ankle. States it has subsided s/p pain medications. Pain has been exacerbated when moving. Had an episode of vomiting while splinting ankle but otherwise no further episodes. Denies fever, chills, chest pain, SOB, abdominal pain and urinary symptoms. Has numbness/ tingling to bilateral feet which she states is chronic 2/2 neuropathy.    In the ED: Afebrile, VSS. H&H 10.9 & 33.2. Cr 1.9. Glucose 348. LFTs AST 50, ALT 45. . Urine and drug toxicology screening pending. Chest Xray impression was interstitial findings may reflect edema versus chronic change, no large focal consolidation. Right Ankle Xray impression was distal tibial and fibular fractures as described. Right Hip Xray impression was fractures of the right inferior and superior pubic rami. There is cortical irregularity involving the right iliac wing, concerning for additional fracture. Right Knee Xray showed  no acute displaced fracture or dislocation of the knee. CT Pelvis showed acute traumatic fractures involving the right iliac wing, the anterior pillar of the right acetabulum and the lateral most aspect of the right superior ramus, and the right inferior pubic ramus. No hip  dislocation. CT Lumbar impression was There is no lumbar vertebral body fracture. There is grade 1 anterolisthesis of L4 on L5. At L3/L4, there is moderate central canal narrowing secondary to the facet arthrosis and ligamentum flavum hypertrophy without significant foraminal stenosis. At L4/L5, there is no stone significant central canal narrowing, but there is bilateral mild foraminal narrowing. Secondary to ligamentum flavum hypertrophy and facet arthrosis. At L5/S1, there is a broad-based disc bulge without any significant central canal stenosis or foraminal stenosis. Pain medications given in the ED. Ortho consulted. Admitted to .        Overview/Hospital Course:  No notes on file    Interval history- mental status doing very well and at baseline still. Cr stable at 1.7, hydrating well. At home drinks coffee a lot but didn't have any on tray this am. Ate some grits. Appetite picking up. Glucose 60s this AM, hold novolog and do sliding scale and will redose later once eating more and when appropraite and dec levemir a few units tonight pending trends. Hg stable at 9.4. will plan to complete antibiotics likely tomorrow 1 more dose before dc for 5 day course given extended confusion for multiple reasons. Psych reports can change to oral depakoaet and cont at SNF and if doing well can d/c while there and would cont melatonin and seroquel qhs as had chronci sleep issues. Plan for snf tomorrow as doing well. BM today              Review of patient's allergies indicates:   Allergen Reactions    Novolin 70/30 (semi-synthetic) Nausea And Vomiting     Severe vomiting on Relion 70/30    Sulfa (sulfonamide antibiotics) Anaphylaxis    Talwin [pentazocine lactate] Anaphylaxis    Victoza [liraglutide] Nausea And Vomiting    Glipizide Nausea Only    Citric acid     Codeine     Influenza virus vaccines Hives    Iodine and iodide containing products Hives    Levetiracetam Itching    Neurontin [gabapentin]      Possible  associated myoclonic jerk    Rituxan [rituximab] Hives    Zoloft [sertraline] Nausea And Vomiting       No current facility-administered medications on file prior to encounter.     Current Outpatient Medications on File Prior to Encounter   Medication Sig    Bacillus coagulans (DIGESTIVE ADVANTAGE IMMUNE ORAL) Take by mouth.    diclofenac sodium (VOLTAREN TOP) Apply topically.    gabapentin (NEURONTIN) 300 MG capsule Take 1 capsule (300 mg total) by mouth 3 (three) times daily.    hydrALAZINE (APRESOLINE) 50 MG tablet Take 1 tablet (50 mg total) by mouth every 8 (eight) hours.    LANTUS SOLOSTAR U-100 INSULIN 100 unit/mL (3 mL) InPn pen Inject 12 Units into the skin every evening.    NOVOLOG FLEXPEN U-100 INSULIN 100 unit/mL (3 mL) InPn pen Inject 20 Units into the skin 3 (three) times daily with meals.    albuterol (PROVENTIL/VENTOLIN HFA) 90 mcg/actuation inhaler Inhale 2 puffs into the lungs every 6 (six) hours as needed for Wheezing or Shortness of Breath.    ASCORBIC ACID, VITAMIN C, ORAL Take by mouth once daily.    aspirin 81 MG Chew Take 1 tablet (81 mg total) by mouth once daily.    atorvastatin (LIPITOR) 80 MG tablet Take 1 tablet by mouth in the evening    cholecalciferol, vitamin D3, (VITAMIN D3) 50 mcg (2,000 unit) Cap Take 2 capsules by mouth 2 (two) times daily.    cyanocobalamin (VITAMIN B-12) 1000 MCG tablet Take 100 mcg by mouth once daily.    DEXCOM G7  Misc Use 1  to track blood glucose, ICD10: E11.65    DEXCOM G7 SENSOR Samina Use 1 sensor every 10 days to track blood glucose, ICD10: E11.65, okay with 90 day supply if possible    EPINEPHrine (EPIPEN) 0.3 mg/0.3 mL AtIn Inject 0.3 mLs (0.3 mg total) into the muscle once. for 1 dose    ESOMEPRAZOLE MAGNESIUM ORAL Take by mouth as needed. (Patient not taking: Reported on 5/28/2024)    FLUoxetine 40 MG capsule Take 1 capsule (40 mg total) by mouth once daily.    isosorbide mononitrate (IMDUR) 30 MG 24 hr tablet Take 1 tablet (30 mg  "total) by mouth once daily.    LIDOcaine (LIDODERM) 5 % Place 1 patch onto the skin once daily. Remove & Discard patch within 12 hours or as directed by MD.    LORazepam (ATIVAN) 1 MG tablet Take 1 tablet (1 mg total) by mouth 2 (two) times daily as needed for Anxiety.    magnesium oxide (MAG-OX) 400 mg tablet Take 400 mg by mouth once daily.    melatonin 10 mg Cap Take 10 mg by mouth nightly.    metoprolol succinate (TOPROL-XL) 25 MG 24 hr tablet Take 1 tablet (25 mg total) by mouth once daily.    multivitamin/iron/folic acid (CENTRUM COMPLETE ORAL) Take by mouth.    OZEMPIC 1 mg/dose (4 mg/3 mL) Inject 1 mg into the skin every 7 days.    pantoprazole (PROTONIX) 40 MG tablet Take 1 tablet (40 mg total) by mouth once daily.    pen needle, diabetic (BD ULTRA-FINE SAGAR PEN NEEDLE) 32 gauge x 5/32" Ndle One pen needle use with insulin pen 4 times a day.  ICD-10: E11.9    semaglutide (OZEMPIC) 0.25 mg or 0.5 mg (2 mg/3 mL) pen injector Inject 0.5 mg once weekly thereafter    ticagrelor (BRILINTA) 90 mg tablet Take 1 tablet (90 mg total) by mouth 2 (two) times daily.    vitamin E 1000 UNIT capsule Take 1,000 Units by mouth once daily.     Family History       Problem Relation (Age of Onset)    Allergy (severe) Daughter    Cancer Mother, Father    Diabetes Sister    Heart disease Mother    Hypertension Sister    Lung cancer Brother    No Known Problems Daughter          Tobacco Use    Smoking status: Never    Smokeless tobacco: Never   Substance and Sexual Activity    Alcohol use: Not Currently    Drug use: Never    Sexual activity: Not Currently     Partners: Male     Review of Systems   Constitutional:  Positive for activity change. Negative for chills and fever.   HENT:  Negative for congestion, hearing loss, rhinorrhea and sore throat.    Eyes:  Negative for visual disturbance.   Respiratory:  Negative for shortness of breath.    Cardiovascular:  Negative for chest pain and leg swelling.   Gastrointestinal:  Negative " for abdominal pain, constipation, diarrhea, nausea and vomiting.   Genitourinary:  Negative for dysuria, frequency and urgency.   Musculoskeletal:  Positive for arthralgias and myalgias.   Neurological:  Positive for numbness (chronic).     Objective:     Vital Signs (Most Recent):  Temp: 98.9 °F (37.2 °C) (08/20/24 1141)  Pulse: 83 (08/20/24 1141)  Resp: 17 (08/20/24 1141)  BP: (!) 155/67 (08/20/24 1141)  SpO2: 98 % (08/20/24 1141) Vital Signs (24h Range):  Temp:  [96.4 °F (35.8 °C)-98.9 °F (37.2 °C)] 98.9 °F (37.2 °C)  Pulse:  [81-90] 83  Resp:  [17-18] 17  SpO2:  [94 %-98 %] 98 %  BP: ()/(46-75) 155/67     Weight: 81.2 kg (179 lb 0.2 oz)  Body mass index is 26.44 kg/m².     Physical Exam  Constitutional:       General: She is not in acute distress.     Appearance: Normal appearance. She is not ill-appearing.      Comments: . Much improved mental status.    HENT:      Head: Normocephalic and atraumatic.   Eyes:      Extraocular Movements: Extraocular movements intact.   Cardiovascular:      Rate and Rhythm: Normal rate and regular rhythm.      Heart sounds: Murmur heard.      No friction rub. No gallop.   Pulmonary:      Effort: Pulmonary effort is normal.      Breath sounds: Normal breath sounds. No wheezing, rhonchi or rales.   Abdominal:      General: There is no distension.      Tenderness: There is no abdominal tenderness. There is no guarding.   Musculoskeletal:         General: Tenderness and signs of injury present.      Right lower leg: No edema.      Left lower leg: No edema.      Comments: [place] to right ankle and right LE post op.  TTP over R hip  Sensation diminished to toes 2/2 neuropathy (chronic)   Neurological:      General: No focal deficit present.      Mental Status: She is alert and oriented to person, place, and time.      Comments:  Oriented to place, situation, surgery, hospital. Mental status much better and sustained at baseline                Significant Labs: All pertinent labs  within the past 24 hours have been reviewed.  BMP:   Recent Labs   Lab 08/20/24  0439   GLU 64*      K 3.9      CO2 23   BUN 40*   CREATININE 1.7*   CALCIUM 8.0*   MG 2.1     CBC:   Recent Labs   Lab 08/19/24  0530 08/20/24  0439   WBC 7.12 7.93   HGB 9.0* 9.4*   HCT 28.6* 29.4*    174       Significant Imaging: I have reviewed all pertinent imaging results/findings within the past 24 hours.    Imaging Results              CT Ankle (Including Hindfoot) Without Contrast Right (Final result)  Result time 08/14/24 04:49:46      Final result by Portillo Oquendo MD (08/14/24 04:49:46)                   Impression:      Near anatomic alignment of complex mildly displaced distal tibia fracture and mildly displaced distal fibular fracture.    Electronically signed by resident: Hira Patel  Date:    08/14/2024  Time:    03:56    Electronically signed by: Portillo Oquendo MD  Date:    08/14/2024  Time:    04:49               Narrative:    EXAMINATION:  CT ANKLE (INCLUDING HINDFOOT) WITHOUT CONTRAST RIGHT; CT 3D RENDERING WO INDEPENDENT WORKSTATION    CLINICAL HISTORY:  Fracture, ankle;; Fracture, ankle;per order request;    TECHNIQUE:  Axial 0.625-mm images of the right ankle obtained without intravenous contrast.  Data submitted for coronal and sagittal reformats.  3D reconstructed images were acquired by post processing on an independent workstation with concurrent supervision and archived for permanent record. 3D imaging of the right ankle was obtained for further evaluation and operative planning if needed.    COMPARISON:  Ankle radiograph 03/14/2024.    FINDINGS:  Bones are demineralized.  There is a cast in place.  There is mildly displaced comminuted distal tibial fracture and a minimally displaced distal fibular fracture with medial angulation of the fracture fragment.  Intra-articular extension fracture fragments which are mildly displaced involving the distal tibia near anatomic alignment of  fracture fragments.  Fractures involve the posterior and medial malleoli.  No dislocation.  There are a few foci of subcutaneous gas overlying the tibial fracture, possibly relating to trauma or reduction procedure though correlation is advised.  Dense calcific tendinosis of the posterior tibial tendon.  No full-thickness injury of the Achilles tendon.  Soft tissue edema about the ankle.  Prominent vascular calcifications noted.                                       CT 3D Rendering WO Independent Workstation (Final result)  Result time 08/14/24 04:49:46      Final result by Portillo Oquendo MD (08/14/24 04:49:46)                   Impression:      Near anatomic alignment of complex mildly displaced distal tibia fracture and mildly displaced distal fibular fracture.    Electronically signed by resident: Hira Patel  Date:    08/14/2024  Time:    03:56    Electronically signed by: Portillo Oquendo MD  Date:    08/14/2024  Time:    04:49               Narrative:    EXAMINATION:  CT ANKLE (INCLUDING HINDFOOT) WITHOUT CONTRAST RIGHT; CT 3D RENDERING WO INDEPENDENT WORKSTATION    CLINICAL HISTORY:  Fracture, ankle;; Fracture, ankle;per order request;    TECHNIQUE:  Axial 0.625-mm images of the right ankle obtained without intravenous contrast.  Data submitted for coronal and sagittal reformats.  3D reconstructed images were acquired by post processing on an independent workstation with concurrent supervision and archived for permanent record. 3D imaging of the right ankle was obtained for further evaluation and operative planning if needed.    COMPARISON:  Ankle radiograph 03/14/2024.    FINDINGS:  Bones are demineralized.  There is a cast in place.  There is mildly displaced comminuted distal tibial fracture and a minimally displaced distal fibular fracture with medial angulation of the fracture fragment.  Intra-articular extension fracture fragments which are mildly displaced involving the distal tibia near  anatomic alignment of fracture fragments.  Fractures involve the posterior and medial malleoli.  No dislocation.  There are a few foci of subcutaneous gas overlying the tibial fracture, possibly relating to trauma or reduction procedure though correlation is advised.  Dense calcific tendinosis of the posterior tibial tendon.  No full-thickness injury of the Achilles tendon.  Soft tissue edema about the ankle.  Prominent vascular calcifications noted.                                       X-Ray Ankle Complete Right (Final result)  Result time 08/14/24 03:44:09      Final result by Portillo Oquendo MD (08/14/24 03:44:09)                   Impression:      Similar alignment of distal tibia and distal fibula fractures post reduction.      Electronically signed by: Portillo Oquendo MD  Date:    08/14/2024  Time:    03:44               Narrative:    EXAMINATION:  XR ANKLE COMPLETE 3 VIEW RIGHT    CLINICAL HISTORY:  Post reduction;    TECHNIQUE:  AP, lateral, and oblique images of the right ankle were performed.    COMPARISON:  08/13/2024.    FINDINGS:  Exam quality is limited by suboptimal positioning and overlapping cast material.  Acute fractures of the distal tibia and distal fibula as seen previously.  Bones are demineralized.  No gross dislocation or significant worsening alignment of fracture fragments.  Soft tissue edema.                                       CT Pelvis Without Contrast (Final result)  Result time 08/13/24 20:58:12      Final result by Quiana Chawla MD (08/13/24 20:58:12)                   Impression:      Acute traumatic fractures involving the right iliac wing, the anterior pillar of the right acetabulum and the lateral most aspect of the right superior ramus, and the right inferior pubic ramus.  No hip dislocation.      Electronically signed by: Quiana Chawla  Date:    08/13/2024  Time:    20:58               Narrative:    EXAMINATION:  CT PELVIS WITHOUT CONTRAST    CLINICAL  HISTORY:  Pelvic trauma;    TECHNIQUE:  1.25 mm axial images were obtained through the pelvis from above the iliac crest through the upper femoral shafts.    COMPARISON:  Plain hip radiograph 08/13/2020 full    FINDINGS:  There is a minimally displaced fracture of the right iliac wing.  There are comminuted fractures involving the anterior pillar of the right acetabulum and the lateral most aspect of the right superior ramus.  There is comminuted and overlapping fracture of the right inferior pubic ramus.  The hips are not dislocated.  The SI joints are intact.  There are degenerative changes seen at the sacroiliac joints and the pubic symphysis.  Bones are osteopenic.  Incidental note is made of vascular calcifications and a small fat containing umbilical hernia.                                       CT Lumbar Spine Without Contrast (Final result)  Result time 08/13/24 20:31:47      Final result by Quiana Chawla MD (08/13/24 20:31:47)                   Impression:      No acute lumbar fracture.  Multilevel degenerative change greatest at L4/L5.    Small bilateral pleural effusions right greater than left.    Gallbladder sludge or gravel-like gallstones.      Electronically signed by: Quiana Chawla  Date:    08/13/2024  Time:    20:31               Narrative:    EXAMINATION:  CT OF THE LUMBAR SPINE WITHOUT    CLINICAL HISTORY:  Complaining of right hip pain secondary to fall from standing.    TECHNIQUE:  1.25 mm axial images were obtained through the lumbar spine. Coronal and sagittal reformatted images were provided.    COMPARISON:  None.    FINDINGS:  There is no lumbar vertebral body fracture.  There is grade 1 anterolisthesis of L4 on L5.  At L3/L4, there is moderate central canal narrowing secondary to the facet arthrosis and ligamentum flavum hypertrophy without significant foraminal stenosis.  At L4/L5, there is no stone significant central canal narrowing, but there is bilateral mild foraminal  narrowing.  Secondary to ligamentum flavum hypertrophy and facet arthrosis.  At L5/S1, there is a broad-based disc bulge without any significant central canal stenosis or foraminal stenosis.  There is small marginal osteophytes throughout.  Vacuum phenomena is seen at L4/L5 with mild intervertebral disc space narrowing.  Incidental note is made of small bilateral pleural effusions right greater than left and gallbladder sludge or gravel-like gallstones.                                       X-Ray Chest AP Portable (Final result)  Result time 08/13/24 17:49:32      Final result by Eddie Montilla MD (08/13/24 17:49:32)                   Impression:      1. Interstitial findings may reflect edema versus chronic change, no large focal consolidation.      Electronically signed by: Eddie Montilla MD  Date:    08/13/2024  Time:    17:49               Narrative:    EXAMINATION:  XR CHEST AP PORTABLE    CLINICAL HISTORY:  Chest Pain;    TECHNIQUE:  Single frontal view of the chest was performed.    COMPARISON:  11/03/2023    FINDINGS:  The cardiomediastinal silhouette is prominent, magnified by technique, similar to the previous exam noting calcification of the aorta..  There is no pleural effusion.  The trachea is midline.  The lungs are symmetrically expanded bilaterally with coarse interstitial attenuation in a central hilar distribution..  No large focal consolidation seen.  There is no pneumothorax.  The osseous structures are remarkable for degenerative changes..                                       X-Ray Ankle Complete Right (Final result)  Result time 08/13/24 17:50:39      Final result by Eddie Montilla MD (08/13/24 17:50:39)                   Impression:      1. Distal tibial and fibular fractures as described.      Electronically signed by: Eddie Montilla MD  Date:    08/13/2024  Time:    17:50               Narrative:    EXAMINATION:  XR ANKLE COMPLETE 3 VIEW RIGHT    CLINICAL HISTORY:  Unspecified  fall, initial encounter    TECHNIQUE:  AP, lateral, and oblique images of the right ankle were performed.    COMPARISON:  Radiograph of the foot 06/01/2021    FINDINGS:  Three views right ankle.    There is osteopenia.  There is acute appearing fracture involving the distal aspect of the fibula.  There is comminuted fracture of the medial malleolus.  The ankle mortise is intact.  There is edema about the ankle.  The posterior aspect of the tibia appears intact.  There are degenerative changes of the calcaneus.  There is vascular calcification.                                       X-Ray Hip 2 or 3 views Right with Pelvis when performed (Final result)  Result time 08/13/24 17:53:13      Final result by Eddie Montilla MD (08/13/24 17:53:13)                   Impression:      1. Fractures of the right inferior and superior pubic rami.  2. There is cortical irregularity involving the right iliac wing, concerning for additional fracture.      Electronically signed by: Eddie Montilla MD  Date:    08/13/2024  Time:    17:53               Narrative:    EXAMINATION:  XR HIP WITH PELVIS WHEN PERFORMED 2 OR 3 VIEWS RIGHT    CLINICAL HISTORY:  Unspecified fall, initial encounter    TECHNIQUE:  AP view of the pelvis and frog leg lateral view of the right hip were performed.    COMPARISON:  None    FINDINGS:  Three views right hip.    The bilateral sacroiliac joints are intact.  The pubic symphysis is intact.  There is osteopenia.  There are fractures of the right superior and inferior pubic rami.  The bilateral femoroacetabular joints are intact noting degenerative change.  There is fracture involving the right iliac wing.                                       X-Ray Knee 1 or 2 View Right (Final result)  Result time 08/13/24 17:53:54   Procedure changed from X-Ray Knee 3 View Right     Final result by Eddie Montilla MD (08/13/24 17:53:54)                   Impression:      1. No acute displaced fracture or dislocation  of the knee.      Electronically signed by: Eddie Montilla MD  Date:    08/13/2024  Time:    17:53               Narrative:    EXAMINATION:  XR KNEE 1 OR 2 VIEW RIGHT    CLINICAL HISTORY:  Pain;  Unspecified fall, initial encounter    TECHNIQUE:  AP and lateral views of the right knee were performed.    COMPARISON:  11/20/2014    FINDINGS:  Two views right knee.    There is osteopenia.  There are degenerative changes of the knee.  There is vascular calcification.  No large right knee joint effusion.                                        Assessment/Plan:      * Closed right pilon fracture  Fall with ankle fx  -transferred here for ortho  -s/p Open reduction internal fixation right ankle pilon fracture with fixation of tibia and fibula on 8/14. NWB for 10 weeks  Per ortho:    We will remove splint, change dressing, and place a well-padded short-leg fiberglass cast prior to hospital discharge.  After incision healed, and sutures removed, likely 3 weeks postop, consider placement into a cam boot versus continuing the cast, depending on patient preference, inability to place an remove Cam boot        X-rays at subsequent followups:  Right ankle  Pelvis Judet     Follow-up postop  2-3 weeks - remove cast, remove sutures, consider short-leg cast versus Cam boot, based on patient's preference  6 weeks, 3 months, 6 months, 1 year    -pain control with multimodals, bowel regimen  -PT/OT  -eliquis for dvt ppx now post op from pelvic surgery  for 10 weeks while NWB- oct 26 end date      Abdominal pain  Has chronic issues, takes maalox at home, reports having here burning with eating and requests and added scheduled now with tums prn  Constipation also making her feel full, suppository added 8/18 not given until 8/19.      Constipation  Abd pain so avoiding extra oral regimen 8/18 and ageeeable to suppository-- wasn't given for ucnlear reasons 8/18 so given thsi Am with nursing. If not resolved will do enema  Successful bms  8/20      Delirium  Present immediately post op in pacu and has been on/off with severe anxiety but now is full delirium with hallucinating and talking to the wall and seeing people/babies. Has UTIS on treatment but hasn't slept in over 48 hours and PRNS not improving with probably worsening delirium the last 24 hours, minimal responsive to zyprexa x2, iv benadryl once, and small dose ativan on 7/17 to try to calm and help sleep.  Psych consulted 8/18 to help with PRNS and sleep to try to get her on meds to promote her to sleep and for agitation until can get back to baseline while treating medical contributors as well  Psych rec depakoate IV TID + melatonin/seroquel scheduled qhs. Apprecaite recs, as patient now with finally improved delirium 8/19 after sleeping.  Resolved and still doing well 8/20. Psych reports can go to oral depakoate at snf and if doing well can stop there but would cont for now ond c. Would keep on seroquel/melatonin qhs as has had chronic sleep issues      Hypoalbuminemia  Start boost glucose control      Hypocalcemia  Hypocalcemia is likely due to  secondary to poor intake and with low albumin lower than true result, with corrected is 8.4 and still low so start replacement . The most recent calcium and albumin results listed below.  Recent Labs     08/15/24  0457 08/16/24  0456 08/17/24  0430   CALCIUM 8.0* 8.0* 6.2*   ALBUMIN 2.2* 2.0* 1.3*     Plan  - Will replace calcium and monitor electrolytes closely.  - The patient's hypocalcemia is stable  - replace        Metabolic acidosis  Na bicarb started, chronic from ckd and likely will need long term      Tremor  Suspect possible from gabapentin as exam with some spotnaneous myoclonic jerking and hyperreflexic on exam and has been intermittent and unclear of timing, has seen neuro in clinic and had parkinsons ruled out she said but no further work up into cause  -hold gabapentin now, as variable cr may have worsened symptoms if related to  this and see if improves, may take 48 hours to improve if so  Still present but worse Cr so may take more time to clear gabapentin if from this        Acute blood loss anemia  Anemia is likely due to acute blood loss which was from surgery . Most recent hemoglobin and hematocrit are listed below.  Recent Labs     08/17/24  0430 08/18/24  0548 08/19/24  0530   HGB 6.8* 10.8* 9.0*   HCT 20.9* 32.4* 28.6*       Plan  - Monitor serial CBC: Daily  - Transfuse PRBC if patient becomes hemodynamically unstable, symptomatic or H/H drops below 7/21.  - Patient has received 2 units of PRBCs on 8/17  - Patient's anemia is currently  downtrend post op  Expected with large surgery  Improved to 10 on 8/18 now after 2 U-->9      Transaminitis  - AST/ ALT mildly elevated  - hold statin  -slightly up at 50--42    Multiple fractures of pelvis  Closed fracture of right iliac wing  Fracture of acetabulum  - AF, VSS  - Xray Hip showed fractures of the right inferior and superior pubic rami   - CT Pelvis showed acute traumatic fractures involving the right iliac wing, the anterior pillar of the right acetabulum and the lateral most aspect of the right superior ramus, and the right inferior pubic ramus   - Ortho consulted- and OR 8/16 with jade with Open reduction internal fixation right both column acetabulum fracture . Multimodal plan control, eliquis started post op for dvt ppx- ortho recs for 10 weeks post op- October 26th end date  -10 weeks NWB to RLE given ankle fx also present now  -PT/OT, plan for SNF 8/21 at [facility]      Acute cystitis  Urine from [facility] resulted 8/16 with E. Faecalis and ecoli late so was not initially on treatment, may be cause for continued confusion and not totally herself since admit her daughter has reported, start vanc for e faecalis and rocephin for ecoli as its resistant to ampicilin so can't use that to cover both unfortunately  Plan for 5 day course given extended delirium, so katy plan until  8/20      KYA (acute kidney injury)  KYA is likely due to pre-renal azotemia due to intravascular volume depletion. Baseline creatinine is  1.6 to 1.8 . Most recent creatinine and eGFR are listed below.  Recent Labs     08/17/24  0430 08/18/24  0548 08/19/24  0530   CREATININE 1.7* 1.9* 1.7*   EGFRNORACEVR 31.5* 27.5* 31.5*        Plan  - KYA is improving Cr 1.7 today and in baseine ranges after IVF down from 2.4, slightly back up 8/18 at 1.9, and monitor, oral intake starting to improve a little-- 1.7  - Avoid nephrotoxins and renally dose meds for GFR listed above  - Monitor urine output, serial BMP, and adjust therapy as needed    -     Chronic diastolic heart failure  - euvolemic on exam  -   - CXR showed interstitial findings may reflect edema versus chronic change, no pleural effusion  - pt is not on a diuretic at this time  - continue to monitor volume status closely  Results for orders placed during the hospital encounter of 11/03/23    Echo    Interpretation Summary    Left Ventricle: The left ventricle is normal in size. Increased wall thickness. Moderate septal thickening. Normal wall motion. There is normal systolic function with a visually estimated ejection fraction of 65 - 70%. Grade I diastolic dysfunction.    Right Ventricle: Normal right ventricular cavity size. Systolic function is normal.    Left Atrium: Left atrium is severely dilated.    Aortic Valve: There is moderate stenosis. Aortic valve area by VTI is 1.02 cm². Aortic valve peak velocity is 2.59 m/s. Mean gradient is 18 mmHg. The dimensionless index is 0.32.    Mitral Valve: There is mild regurgitation.    Tricuspid Valve: There is mild regurgitation.    Pulmonary Artery: The estimated pulmonary artery systolic pressure is 35 mmHg.    IVC/SVC: Normal venous pressure at 3 mmHg.        Stage 3a chronic kidney disease  Creatine stable for now. BMP reviewed- noted Estimated Creatinine Clearance: 33.6 mL/min (A) (based on SCr of 1.7  mg/dL (H)). according to latest data. Based on current GFR, CKD stage is stage 4 - GFR 15-29.  Monitor UOP and serial BMP and adjust therapy as needed. Renally dose meds. Avoid nephrotoxic medications and procedures.  - Cr 1.9 (near most recent baseline which is 1.6 to 1.8) on admit but 2.2 now so will do light IVF on 8/15 but slightly worse at 2.4 now with bobbi and closer to baseline 8/17 at 1.7-->1.9--1.7 now  - daily BMP  - avoid nephrotoxic medications    Hypomagnesemia  Patient has Abnormal Magnesium: hypomagnesemia. Will continue to monitor electrolytes closely. Will replace the affected electrolytes and repeat labs to be done after interventions completed. The patient's magnesium results have been reviewed and are listed below.  Recent Labs   Lab 08/15/24  0457   MG 2.2        Coronary artery disease of native artery of native heart with stable angina pectoris  Patient with known CAD which is controlled Will continue Statin and monitor for S/Sx of angina/ACS. Continue to monitor on telemetry.   Had stents placed in 2018 to rca and 2020 to 2 areas per dr cervantes note from [facility], had stress in 2022 that was negative for ischemia. She said he wanted to recheck another one recently but insurance did not cover. She said had aspirin allergy testing before it was restarted due to prev intolerance after a stent. Resume asa pending op plans.    Type 2 diabetes mellitus with stage 3 chronic kidney disease, with long-term current use of insulin  Hard to titrate at baseline and had to have doctors adjust a lot, runs very high at times up to 1100 she said once even.   Titrate 8/15, running higher as didn't get long acting yesterday and adjusted today and will adjsut further. Has some allergies to some insulins. On tresiba at home.  - low dose SSI  - diabetic diet  - POCT checks  Baseline runs very high per dr schmidt notes she sees with endocrine outpatient. Baseline average 253 glucose on dexcom he reports. Dexcom  removed for OR. On drip in OR wasn't given llantus this AM though. Will incease dose to giev now in pacu and inc to moderate sliding scale and inc novolog and will likely titrate here as not contolled at home.  Glucose in 100s on 8/17 after 28 U lantus last night and moderate slidign scale. Isnt eating much on ful liquids now, so hold novolog unless glucose is >160 with meals and eating at least half of meal if is in 100s but she tends to run higher even when NPo yesterday was 300s in pacu so titrate further based on trending today and will watch closely. Has had no hypoglycemia on dexcom per above.  Glucose better but not eating, just starting to eat 8/18 so will likely need to titrate more  Improved 8/19, glucose lwoer 100s, holding novolgo with breakfast as didn't eat too much, nursing comm to hold novolog if eats less than half meal and glucose under 160 and titrate down dose also while is not eating as much   Glucose 60s this am 8/20 so stop novolog for now and will add back pending trends with sliding scale prn, dec levemir a few u tonight, eating better so likely will trend back up    Mixed hyperlipidemia  - continue home statin    Primary hypertension  - Chronic, controlled  - continue home imdur and metoprolol-- inc imdur 8/18    Anxiety  - hx noted  - continue home fluoxetine   High anxiety at baseline per her and daugher, doctors have been hesitant to do acute anxiety meds due to fall risk they said, was not on on admit, high anxiety 8/15, will try ativan x 1 as suspect having possible panic attack after therapy sesion based on nusring description and did better after this  Very anxious in pacu and anesthesia bedside, giving precedex x 1 to relex,and required zyprexa on 8/16 PM and restraints to calm down as confused and anxious.  Will give ativan once as showing signs 8/17 of panic with chest pressure and tingling.  Now more delirious in later 8/17 pm and 8/18 worsening, talking to wall/etc. See  delirium  Improved now 8/19, near baseilne now      VTE Risk Mitigation (From admission, onward)           Ordered     apixaban tablet 2.5 mg  2 times daily         08/17/24 0746     Reason for No Pharmacological VTE Prophylaxis  Once        Question:  Reasons:  Answer:  Risk of Bleeding    08/14/24 0120     IP VTE HIGH RISK PATIENT  Once         08/14/24 0120                    Discharge Planning   MIKHAIL: 8/21/2024     Code Status: Full Code   Is the patient medically ready for discharge?:     Reason for patient still in hospital (select all that apply): Patient trending condition  Discharge Plan A: Skilled Nursing Facility                  Rupal Ochoa MD  Department of Hospital Medicine   [facility] - Surgery

## 2024-08-20 NOTE — ASSESSMENT & PLAN NOTE
Present immediately post op in pacu and has been on/off with severe anxiety but now is full delirium with hallucinating and talking to the wall and seeing people/babies. Has UTIS on treatment but hasn't slept in over 48 hours and PRNS not improving with probably worsening delirium the last 24 hours, minimal responsive to zyprexa x2, iv benadryl once, and small dose ativan on 7/17 to try to calm and help sleep.  Psych consulted 8/18 to help with PRNS and sleep to try to get her on meds to promote her to sleep and for agitation until can get back to baseline while treating medical contributors as well  Psych rec depakoate IV TID + melatonin/seroquel scheduled qhs. Apprecaite recs, as patient now with finally improved delirium 8/19 after sleeping.  Resolved and still doing well 8/20. Psych reports can go to oral depakoate at snf and if doing well can stop there but would cont for now ond c. Would keep on seroquel/melatonin qhs as has had chronic sleep issues

## 2024-08-20 NOTE — ASSESSMENT & PLAN NOTE
Closed fracture of right iliac wing  Fracture of acetabulum  - AF, VSS  - Xray Hip showed fractures of the right inferior and superior pubic rami   - CT Pelvis showed acute traumatic fractures involving the right iliac wing, the anterior pillar of the right acetabulum and the lateral most aspect of the right superior ramus, and the right inferior pubic ramus   - Ortho consulted- and OR 8/16 with jade with Open reduction internal fixation right both column acetabulum fracture . Multimodal plan control, eliquis started post op for dvt ppx- ortho recs for 10 weeks post op- October 26th end date  -10 weeks NWB to RLE given ankle fx also present now  -PT/OT, plan for SNF 8/21 at [facility]

## 2024-08-20 NOTE — PROGRESS NOTES
Pharmacokinetic Assessment Follow Up: IV Vancomycin    Vancomycin serum concentration assessment(s):    The random level was drawn correctly and can be used to guide therapy at this time. The measurement is within the desired definitive target range of 10 to 20 mcg/mL.    Vancomycin Regimen Plan:    Will re-dose with vancomycin 500 mg x 1  Next level to be drawn @ 04:30 a.m. on 08/21  Plan to scheduled vancomycin based on 08/21 random results.   KYA resolved and SCr @ baseline.       Drug levels (last 3 results):  Recent Labs   Lab Result Units 08/18/24  1051 08/18/24  2231 08/20/24  0439   Vancomycin, Random ug/mL 20.8 18.6 14.3       Pharmacy will continue to follow and monitor vancomycin.    Please contact pharmacy at extension [phone] for questions regarding this assessment.    Thank you for the consult,   Eufemia Grimaldo       Patient brief summary:  Lorena Contreras is a 73 y.o. female initiated on antimicrobial therapy with IV Vancomycin for treatment of urinary tract infection    The patient's current regimen is pulse dosing.     Drug Allergies:   Review of patient's allergies indicates:   Allergen Reactions    Novolin 70/30 (semi-synthetic) Nausea And Vomiting     Severe vomiting on Relion 70/30    Sulfa (sulfonamide antibiotics) Anaphylaxis    Talwin [pentazocine lactate] Anaphylaxis    Victoza [liraglutide] Nausea And Vomiting    Glipizide Nausea Only    Citric acid     Codeine     Influenza virus vaccines Hives    Iodine and iodide containing products Hives    Levetiracetam Itching    Rituxan [rituximab] Hives    Zoloft [sertraline] Nausea And Vomiting       Actual Body Weight:   81.2 Kg.     Renal Function:   Estimated Creatinine Clearance: 33.6 mL/min (A) (based on SCr of 1.7 mg/dL (H)).,     Dialysis Method (if applicable):  N/A    CBC (last 72 hours):  Recent Labs   Lab Result Units 08/18/24  0548 08/19/24  0530 08/20/24  0439   WBC K/uL 10.48 7.12 7.93   Hemoglobin g/dL 10.8* 9.0* 9.4*    Hematocrit % 32.4* 28.6* 29.4*   Platelets K/uL 178 166 174   Gran % % 79.1* 70.5 64.7   Lymph % % 8.2* 16.0* 20.8   Mono % % 10.5 9.8 9.1   Eosinophil % % 1.0 2.4 4.2   Basophil % % 0.6 0.6 0.6   Differential Method  Automated Automated Automated       Metabolic Panel (last 72 hours):  Recent Labs   Lab Result Units 08/18/24  0548 08/19/24  0530 08/20/24  0439   Sodium mmol/L 135* 137 137   Potassium mmol/L 4.9 4.1 3.9   Chloride mmol/L 105 109 108   CO2 mmol/L 20* 21* 23   Glucose mg/dL 190* 129* 64*   BUN mg/dL 42* 40* 40*   Creatinine mg/dL 1.9* 1.7* 1.7*   Albumin g/dL 1.9* 1.5* 1.4*   Total Bilirubin mg/dL 0.5 0.3 0.3   Alkaline Phosphatase U/L 159* 132 140*   AST U/L 50* 42* 45*   ALT U/L 9* 9* 15   Magnesium mg/dL 1.9 1.8 2.1   Phosphorus mg/dL 3.6 3.4 3.9       Vancomycin Administrations:  vancomycin given in the last 96 hours                     vancomycin (VANCOCIN) 500 mg in D5W 100 mL IVPB (MB+) (mg) 500 mg New Bag 08/19/24 0039    vancomycin 1,250 mg in D5W 250 mL IVPB (Vial-Mate) (mg) 1,250 mg New Bag 08/17/24 1028    vancomycin injection (g) 2 g Given 08/16/24 1403                    Microbiologic Results:  Microbiology Results (last 7 days)       Procedure Component Value Units Date/Time    Urine culture [[id number]]  (Abnormal)  (Susceptibility) Collected: 08/14/24 0229    Order Status: Completed Specimen: Urine Updated: 08/16/24 1351     Urine Culture, Routine ESCHERICHIA COLI  >100,000 cfu/ml        ENTEROCOCCUS FAECALIS  > 100,000 cfu/ml      Narrative:      Specimen Source->Urine

## 2024-08-21 VITALS
RESPIRATION RATE: 18 BRPM | BODY MASS INDEX: 26.51 KG/M2 | TEMPERATURE: 98 F | HEART RATE: 87 BPM | DIASTOLIC BLOOD PRESSURE: 56 MMHG | SYSTOLIC BLOOD PRESSURE: 110 MMHG | WEIGHT: 179 LBS | HEIGHT: 69 IN | OXYGEN SATURATION: 98 %

## 2024-08-21 LAB
ALBUMIN SERPL BCP-MCNC: 1.6 G/DL (ref 3.5–5.2)
ALP SERPL-CCNC: 175 U/L (ref 55–135)
ALT SERPL W/O P-5'-P-CCNC: 18 U/L (ref 10–44)
ANION GAP SERPL CALC-SCNC: 9 MMOL/L (ref 8–16)
AST SERPL-CCNC: 49 U/L (ref 10–40)
BASOPHILS # BLD AUTO: 0.03 K/UL (ref 0–0.2)
BASOPHILS NFR BLD: 0.4 % (ref 0–1.9)
BILIRUB SERPL-MCNC: 0.3 MG/DL (ref 0.1–1)
BUN SERPL-MCNC: 35 MG/DL (ref 8–23)
CALCIUM SERPL-MCNC: 8.1 MG/DL (ref 8.7–10.5)
CHLORIDE SERPL-SCNC: 105 MMOL/L (ref 95–110)
CO2 SERPL-SCNC: 22 MMOL/L (ref 23–29)
CREAT SERPL-MCNC: 1.8 MG/DL (ref 0.5–1.4)
DIFFERENTIAL METHOD BLD: ABNORMAL
EOSINOPHIL # BLD AUTO: 0.3 K/UL (ref 0–0.5)
EOSINOPHIL NFR BLD: 4.1 % (ref 0–8)
ERYTHROCYTE [DISTWIDTH] IN BLOOD BY AUTOMATED COUNT: 16.1 % (ref 11.5–14.5)
EST. GFR  (NO RACE VARIABLE): 29.4 ML/MIN/1.73 M^2
GLUCOSE SERPL-MCNC: 67 MG/DL (ref 70–110)
HCT VFR BLD AUTO: 33.9 % (ref 37–48.5)
HGB BLD-MCNC: 10.1 G/DL (ref 12–16)
IMM GRANULOCYTES # BLD AUTO: 0.1 K/UL (ref 0–0.04)
IMM GRANULOCYTES NFR BLD AUTO: 1.3 % (ref 0–0.5)
LYMPHOCYTES # BLD AUTO: 1.3 K/UL (ref 1–4.8)
LYMPHOCYTES NFR BLD: 17.4 % (ref 18–48)
MAGNESIUM SERPL-MCNC: 2.2 MG/DL (ref 1.6–2.6)
MCH RBC QN AUTO: 28.5 PG (ref 27–31)
MCHC RBC AUTO-ENTMCNC: 29.8 G/DL (ref 32–36)
MCV RBC AUTO: 96 FL (ref 82–98)
MONOCYTES # BLD AUTO: 0.7 K/UL (ref 0.3–1)
MONOCYTES NFR BLD: 9.1 % (ref 4–15)
NEUTROPHILS # BLD AUTO: 5.2 K/UL (ref 1.8–7.7)
NEUTROPHILS NFR BLD: 67.7 % (ref 38–73)
NRBC BLD-RTO: 0 /100 WBC
PHOSPHATE SERPL-MCNC: 3.8 MG/DL (ref 2.7–4.5)
PLATELET # BLD AUTO: 199 K/UL (ref 150–450)
PMV BLD AUTO: 11.4 FL (ref 9.2–12.9)
POCT GLUCOSE: 104 MG/DL (ref 70–110)
POCT GLUCOSE: 107 MG/DL (ref 70–110)
POCT GLUCOSE: 116 MG/DL (ref 70–110)
POCT GLUCOSE: 159 MG/DL (ref 70–110)
POCT GLUCOSE: 56 MG/DL (ref 70–110)
POTASSIUM SERPL-SCNC: 3.9 MMOL/L (ref 3.5–5.1)
PROT SERPL-MCNC: 5.3 G/DL (ref 6–8.4)
RBC # BLD AUTO: 3.55 M/UL (ref 4–5.4)
SARS-COV-2 RNA RESP QL NAA+PROBE: NOT DETECTED
SODIUM SERPL-SCNC: 136 MMOL/L (ref 136–145)
VANCOMYCIN SERPL-MCNC: 16.5 UG/ML
WBC # BLD AUTO: 7.72 K/UL (ref 3.9–12.7)

## 2024-08-21 PROCEDURE — 85025 COMPLETE CBC W/AUTO DIFF WBC: CPT | Mod: HCNC | Performed by: HOSPITALIST

## 2024-08-21 PROCEDURE — 51702 INSERT TEMP BLADDER CATH: CPT | Mod: HCNC

## 2024-08-21 PROCEDURE — 87635 SARS-COV-2 COVID-19 AMP PRB: CPT | Mod: HCNC | Performed by: HOSPITALIST

## 2024-08-21 PROCEDURE — 80053 COMPREHEN METABOLIC PANEL: CPT | Mod: HCNC | Performed by: HOSPITALIST

## 2024-08-21 PROCEDURE — 25000003 PHARM REV CODE 250: Mod: HCNC

## 2024-08-21 PROCEDURE — 25000003 PHARM REV CODE 250: Mod: HCNC | Performed by: HOSPITALIST

## 2024-08-21 PROCEDURE — 25000003 PHARM REV CODE 250: Mod: HCNC | Performed by: INTERNAL MEDICINE

## 2024-08-21 PROCEDURE — 51798 US URINE CAPACITY MEASURE: CPT | Mod: HCNC

## 2024-08-21 PROCEDURE — 80202 ASSAY OF VANCOMYCIN: CPT | Mod: HCNC | Performed by: HOSPITALIST

## 2024-08-21 PROCEDURE — 63600175 PHARM REV CODE 636 W HCPCS: Mod: HCNC | Performed by: HOSPITALIST

## 2024-08-21 PROCEDURE — 84100 ASSAY OF PHOSPHORUS: CPT | Mod: HCNC | Performed by: HOSPITALIST

## 2024-08-21 PROCEDURE — 83735 ASSAY OF MAGNESIUM: CPT | Mod: HCNC | Performed by: HOSPITALIST

## 2024-08-21 RX ORDER — NAPROXEN SODIUM 220 MG/1
81 TABLET, FILM COATED ORAL DAILY
Start: 2024-08-21 | End: 2025-08-21

## 2024-08-21 RX ORDER — IBUPROFEN 100 MG
TABLET ORAL
Start: 2024-08-21

## 2024-08-21 RX ORDER — INSULIN GLARGINE 100 [IU]/ML
19 INJECTION, SOLUTION SUBCUTANEOUS NIGHTLY
Status: DISCONTINUED | OUTPATIENT
Start: 2024-08-21 | End: 2024-08-21 | Stop reason: HOSPADM

## 2024-08-21 RX ORDER — INSULIN GLARGINE 100 [IU]/ML
19 INJECTION, SOLUTION SUBCUTANEOUS NIGHTLY
Start: 2024-08-21 | End: 2025-08-21

## 2024-08-21 RX ADMIN — CALCIUM CARBONATE (ANTACID) CHEW TAB 500 MG 1000 MG: 500 CHEW TAB at 08:08

## 2024-08-21 RX ADMIN — ACETAMINOPHEN 500 MG: 500 TABLET ORAL at 05:08

## 2024-08-21 RX ADMIN — SODIUM BICARBONATE 650 MG TABLET 650 MG: at 08:08

## 2024-08-21 RX ADMIN — PANTOPRAZOLE SODIUM 40 MG: 40 TABLET, DELAYED RELEASE ORAL at 08:08

## 2024-08-21 RX ADMIN — APIXABAN 2.5 MG: 2.5 TABLET, FILM COATED ORAL at 08:08

## 2024-08-21 RX ADMIN — METHOCARBAMOL 500 MG: 500 TABLET ORAL at 08:08

## 2024-08-21 RX ADMIN — DEXTROSE MONOHYDRATE 250 MG: 50 INJECTION, SOLUTION INTRAVENOUS at 12:08

## 2024-08-21 RX ADMIN — ALUMINUM HYDROXIDE, MAGNESIUM HYDROXIDE, SIMETHICONE 30 ML: 400; 400; 40 SUSPENSION ORAL at 05:08

## 2024-08-21 RX ADMIN — FLUOXETINE HYDROCHLORIDE 40 MG: 20 CAPSULE ORAL at 08:08

## 2024-08-21 RX ADMIN — ACETAMINOPHEN 500 MG: 500 TABLET ORAL at 02:08

## 2024-08-21 RX ADMIN — OXYCODONE HYDROCHLORIDE 10 MG: 10 TABLET ORAL at 12:08

## 2024-08-21 RX ADMIN — METOPROLOL SUCCINATE 25 MG: 25 TABLET, EXTENDED RELEASE ORAL at 08:08

## 2024-08-21 RX ADMIN — METHOCARBAMOL 500 MG: 500 TABLET ORAL at 12:08

## 2024-08-21 RX ADMIN — OXYCODONE HYDROCHLORIDE 10 MG: 10 TABLET ORAL at 07:08

## 2024-08-21 RX ADMIN — DEXTROSE MONOHYDRATE 250 MG: 50 INJECTION, SOLUTION INTRAVENOUS at 02:08

## 2024-08-21 RX ADMIN — INSULIN ASPART 2 UNITS: 100 INJECTION, SOLUTION INTRAVENOUS; SUBCUTANEOUS at 12:08

## 2024-08-21 RX ADMIN — INSULIN GLARGINE 24 UNITS: 100 INJECTION, SOLUTION SUBCUTANEOUS at 12:08

## 2024-08-21 RX ADMIN — ISOSORBIDE MONONITRATE 60 MG: 30 TABLET, EXTENDED RELEASE ORAL at 08:08

## 2024-08-21 RX ADMIN — Medication 16 G: at 06:08

## 2024-08-21 RX ADMIN — VANCOMYCIN HYDROCHLORIDE 500 MG: 500 INJECTION, POWDER, LYOPHILIZED, FOR SOLUTION INTRAVENOUS at 02:08

## 2024-08-21 NOTE — PLAN OF CARE
Patient is medically stable for discharge to [facility] this am pending [employer] auth. Orders faxed to facility. Family signed intake paper work with staff on 8/20/24.       1025 AM---Spoke with Caryl from [facility]. Staff reports [employer] auth has been received and they are currently reviewing orders.          Karmen REECE  Case Management  [facility]  [phone]

## 2024-08-21 NOTE — PROGRESS NOTES
[facility] - [facility]  Orthopedics  Progress Note    Patient Name: Lorena Contreras  MRN: [id number]  Admission Date: 8/13/2024  Hospital Length of Stay: 7 days  Attending Provider: Rupal Ochoa MD  Primary Care Provider: Jorge Paris MD  Follow-up For: Procedure(s) (LRB):  ORIF, FRACTURE, ACETABULUM (Right)    Post-Operative Day: 5 Days Post-Op  Subjective:     Principal Problem:Closed right pilon fracture    Principal Orthopedic Problem: same + R both column acetabular fracture s/p ORIF pilon 8/14/24 and ORIF right acetabulum 8/16/24    Interval History: Pt seen and examined at bedside. AF, systolic to 187 overnight, otherwise HDS. She is A&Ox4. Denies any numbness or tingling. Reports pain to the right ankle but pain is controlled to the pelvis. Discharging to facility today.     Review of patient's allergies indicates:   Allergen Reactions    Novolin 70/30 (semi-synthetic) Nausea And Vomiting     Severe vomiting on Relion 70/30    Sulfa (sulfonamide antibiotics) Anaphylaxis    Talwin [pentazocine lactate] Anaphylaxis    Victoza [liraglutide] Nausea And Vomiting    Glipizide Nausea Only    Citric acid     Codeine     Influenza virus vaccines Hives    Iodine and iodide containing products Hives    Levetiracetam Itching    Neurontin [gabapentin]      Possible associated myoclonic jerk    Rituxan [rituximab] Hives    Zoloft [sertraline] Nausea And Vomiting       Current Facility-Administered Medications   Medication    0.9%  NaCl infusion (for blood administration)    0.9%  NaCl infusion (for blood administration)    acetaminophen tablet 500 mg    albuterol-ipratropium 2.5 mg-0.5 mg/3 mL nebulizer solution 3 mL    aluminum & magnesium hydroxide-simethicone 400-400-40 mg/5 mL suspension 30 mL    apixaban tablet 2.5 mg    bisacodyL suppository 10 mg    bisacodyL suppository 10 mg    calcium carbonate 200 mg calcium (500 mg) chewable tablet 1,000 mg    calcium carbonate 200 mg calcium (500 mg) chewable  "tablet 500 mg    cefTRIAXone (Rocephin) 1 g in D5W 100 mL IVPB (MB+)    dextrose 10% bolus 125 mL 125 mL    dextrose 10% bolus 125 mL 125 mL    dextrose 10% bolus 125 mL 125 mL    dextrose 10% bolus 250 mL 250 mL    dextrose 10% bolus 250 mL 250 mL    dextrose 10% bolus 250 mL 250 mL    FLUoxetine capsule 40 mg    glucagon (human recombinant) injection 1 mg    glucagon (human recombinant) injection 1 mg    glucose chewable tablet 16 g    glucose chewable tablet 16 g    glucose chewable tablet 24 g    glucose chewable tablet 24 g    hydrOXYzine HCL tablet 25 mg    insulin aspart U-100 pen 0-10 Units    insulin glargine U-100 (Lantus) pen 19 Units    isosorbide mononitrate 24 hr tablet 60 mg    melatonin tablet 6 mg    methocarbamoL tablet 500 mg    metoprolol succinate (TOPROL-XL) 24 hr tablet 25 mg    ondansetron disintegrating tablet 8 mg    oxyCODONE immediate release tablet 5 mg    oxyCODONE immediate release tablet Tab 10 mg    pantoprazole EC tablet 40 mg    polyethylene glycol packet 17 g    promethazine tablet 25 mg    QUEtiapine tablet 50 mg    sodium bicarbonate tablet 650 mg    sodium chloride 0.9% flush 10 mL    valproate (DEPACON) 250 mg in D5W 50 mL IVPB    vancomycin (VANCOCIN) 500 mg in D5W 100 mL IVPB (MB+)    vancomycin - pharmacy to dose     Objective:     Vital Signs (Most Recent):  Temp: 98.2 °F (36.8 °C) (08/21/24 0418)  Pulse: 80 (08/21/24 0418)  Resp: 15 (08/21/24 0418)  BP: (!) 152/67 (08/21/24 0418)  SpO2: 97 % (08/21/24 0418) Vital Signs (24h Range):  Temp:  [97.6 °F (36.4 °C)-98.9 °F (37.2 °C)] 98.2 °F (36.8 °C)  Pulse:  [78-91] 80  Resp:  [15-18] 15  SpO2:  [93 %-99 %] 97 %  BP: (105-187)/(51-96) 152/67     Weight: 81.2 kg (179 lb 0.2 oz)  Height: 5' 9" (175.3 cm)  Body mass index is 26.44 kg/m².      Intake/Output Summary (Last 24 hours) at 8/21/2024 0746  Last data filed at 8/21/2024 0450  Gross per 24 hour   Intake --   Output 1250 ml   Net -1250 ml          Ortho/SPM Exam  Gen: NAD, " WDWN  CV: peripherally well perfused  Resp: unlabored respirations, symmetric chest rise    MSK:  RLE:  Surgical dressings over right hip and abdomen c/d/I  Posterior slab splint in place  Fires Quad, wiggles toes  SILT  Compartments soft  Brisk cap refill  Foot is WWP     Significant Labs: CBC:   Recent Labs   Lab 08/20/24  0439 08/21/24  0431   WBC 7.93 7.72   HGB 9.4* 10.1*   HCT 29.4* 33.9*    199     CMP:   Recent Labs   Lab 08/20/24  0439 08/21/24  0431    136   K 3.9 3.9    105   CO2 23 22*   GLU 64* 67*   BUN 40* 35*   CREATININE 1.7* 1.8*   CALCIUM 8.0* 8.1*   PROT 5.0* 5.3*   ALBUMIN 1.4* 1.6*   BILITOT 0.3 0.3   ALKPHOS 140* 175*   AST 45* 49*   ALT 15 18   ANIONGAP 6* 9     All pertinent labs within the past 24 hours have been reviewed.    Significant Imaging: I have reviewed all pertinent imaging results/findings.  Assessment/Plan:     * Closed right pilon fracture  Lorena Contreras is a 73 y.o. female with R pilon fracture s/p ORIF 8/14/24    NWB RLE  Eliquis 2.5 BID x 10 weeks  Removal of splint today and change to cast prior to discharge           Closed displaced fracture of right acetabulum  S/p ORIF R acetabulum 8/16    - NWB RLE x 10 weeks  - Eliquis 2.5 BID x 10 weeks  - leave dressings dry and intact  - Rocephin for UTI             CARLOS Finney MD  Orthopedics  [facility] - Surgery

## 2024-08-21 NOTE — SUBJECTIVE & OBJECTIVE
Principal Problem:Closed right pilon fracture    Principal Orthopedic Problem: same + R both column acetabular fracture s/p ORIF pilon 8/14/24 and ORIF right acetabulum 8/16/24    Interval History: Pt seen and examined at bedside. AF, systolic to 187 overnight, otherwise HDS. She is A&Ox4. Denies any numbness or tingling. Reports pain to the right ankle but pain is controlled to the pelvis. Discharging to facility today.     Review of patient's allergies indicates:   Allergen Reactions    Novolin 70/30 (semi-synthetic) Nausea And Vomiting     Severe vomiting on Relion 70/30    Sulfa (sulfonamide antibiotics) Anaphylaxis    Talwin [pentazocine lactate] Anaphylaxis    Victoza [liraglutide] Nausea And Vomiting    Glipizide Nausea Only    Citric acid     Codeine     Influenza virus vaccines Hives    Iodine and iodide containing products Hives    Levetiracetam Itching    Neurontin [gabapentin]      Possible associated myoclonic jerk    Rituxan [rituximab] Hives    Zoloft [sertraline] Nausea And Vomiting       Current Facility-Administered Medications   Medication    0.9%  NaCl infusion (for blood administration)    0.9%  NaCl infusion (for blood administration)    acetaminophen tablet 500 mg    albuterol-ipratropium 2.5 mg-0.5 mg/3 mL nebulizer solution 3 mL    aluminum & magnesium hydroxide-simethicone 400-400-40 mg/5 mL suspension 30 mL    apixaban tablet 2.5 mg    bisacodyL suppository 10 mg    bisacodyL suppository 10 mg    calcium carbonate 200 mg calcium (500 mg) chewable tablet 1,000 mg    calcium carbonate 200 mg calcium (500 mg) chewable tablet 500 mg    cefTRIAXone (Rocephin) 1 g in D5W 100 mL IVPB (MB+)    dextrose 10% bolus 125 mL 125 mL    dextrose 10% bolus 125 mL 125 mL    dextrose 10% bolus 125 mL 125 mL    dextrose 10% bolus 250 mL 250 mL    dextrose 10% bolus 250 mL 250 mL    dextrose 10% bolus 250 mL 250 mL    FLUoxetine capsule 40 mg    glucagon (human recombinant) injection 1 mg    glucagon (human  "recombinant) injection 1 mg    glucose chewable tablet 16 g    glucose chewable tablet 16 g    glucose chewable tablet 24 g    glucose chewable tablet 24 g    hydrOXYzine HCL tablet 25 mg    insulin aspart U-100 pen 0-10 Units    insulin glargine U-100 (Lantus) pen 19 Units    isosorbide mononitrate 24 hr tablet 60 mg    melatonin tablet 6 mg    methocarbamoL tablet 500 mg    metoprolol succinate (TOPROL-XL) 24 hr tablet 25 mg    ondansetron disintegrating tablet 8 mg    oxyCODONE immediate release tablet 5 mg    oxyCODONE immediate release tablet Tab 10 mg    pantoprazole EC tablet 40 mg    polyethylene glycol packet 17 g    promethazine tablet 25 mg    QUEtiapine tablet 50 mg    sodium bicarbonate tablet 650 mg    sodium chloride 0.9% flush 10 mL    valproate (DEPACON) 250 mg in D5W 50 mL IVPB    vancomycin (VANCOCIN) 500 mg in D5W 100 mL IVPB (MB+)    vancomycin - pharmacy to dose     Objective:     Vital Signs (Most Recent):  Temp: 98.2 °F (36.8 °C) (08/21/24 0418)  Pulse: 80 (08/21/24 0418)  Resp: 15 (08/21/24 0418)  BP: (!) 152/67 (08/21/24 0418)  SpO2: 97 % (08/21/24 0418) Vital Signs (24h Range):  Temp:  [97.6 °F (36.4 °C)-98.9 °F (37.2 °C)] 98.2 °F (36.8 °C)  Pulse:  [78-91] 80  Resp:  [15-18] 15  SpO2:  [93 %-99 %] 97 %  BP: (105-187)/(51-96) 152/67     Weight: 81.2 kg (179 lb 0.2 oz)  Height: 5' 9" (175.3 cm)  Body mass index is 26.44 kg/m².      Intake/Output Summary (Last 24 hours) at 8/21/2024 0704  Last data filed at 8/21/2024 0450  Gross per 24 hour   Intake --   Output 1250 ml   Net -1250 ml          Ortho/SPM Exam  Gen: NAD, WDWN  CV: peripherally well perfused  Resp: unlabored respirations, symmetric chest rise    MSK:  RLE:  Surgical dressings over right hip and abdomen c/d/I  Posterior slab splint in place  Fires Quad, wiggles toes  SILT  Compartments soft  Brisk cap refill  Foot is WWP     Significant Labs: CBC:   Recent Labs   Lab 08/20/24  0439 08/21/24  0431   WBC 7.93 7.72   HGB 9.4* 10.1* "   HCT 29.4* 33.9*    199     CMP:   Recent Labs   Lab 08/20/24  0439 08/21/24  0431    136   K 3.9 3.9    105   CO2 23 22*   GLU 64* 67*   BUN 40* 35*   CREATININE 1.7* 1.8*   CALCIUM 8.0* 8.1*   PROT 5.0* 5.3*   ALBUMIN 1.4* 1.6*   BILITOT 0.3 0.3   ALKPHOS 140* 175*   AST 45* 49*   ALT 15 18   ANIONGAP 6* 9     All pertinent labs within the past 24 hours have been reviewed.    Significant Imaging: I have reviewed all pertinent imaging results/findings.

## 2024-08-21 NOTE — PROGRESS NOTES
Pharmacokinetic Assessment Follow Up: IV Vancomycin    Vancomycin serum concentration assessment(s):    The random level was drawn correctly and can be used to guide therapy at this time. The measurement is within the desired definitive target range of 15 to 20 mcg/mL.    Vancomycin Regimen Plan:    Re-dose when the random level is less than 20 mcg/mL, next level to be drawn at 10:00 on 08/22  Patient output 0.2mL/kg/hr. Will continue to pulse dose.     Drug levels (last 3 results):  Recent Labs   Lab Result Units 08/18/24  2231 08/20/24  0439 08/21/24  0431   Vancomycin, Random ug/mL 18.6 14.3 16.5       Pharmacy will continue to follow and monitor vancomycin.    Please contact pharmacy at extension [phone] for questions regarding this assessment.    Thank you for the consult,   Eufemia Grimaldo       Patient brief summary:  Lorena Contreras is a 73 y.o. female initiated on antimicrobial therapy with IV Vancomycin for treatment of urinary tract infection    The patient's current regimen is pulse dosing based on random.     Drug Allergies:   Review of patient's allergies indicates:   Allergen Reactions    Novolin 70/30 (semi-synthetic) Nausea And Vomiting     Severe vomiting on Relion 70/30    Sulfa (sulfonamide antibiotics) Anaphylaxis    Talwin [pentazocine lactate] Anaphylaxis    Victoza [liraglutide] Nausea And Vomiting    Glipizide Nausea Only    Citric acid     Codeine     Influenza virus vaccines Hives    Iodine and iodide containing products Hives    Levetiracetam Itching    Neurontin [gabapentin]      Possible associated myoclonic jerk    Rituxan [rituximab] Hives    Zoloft [sertraline] Nausea And Vomiting       Actual Body Weight:   81.2 Kg    Renal Function:   Estimated Creatinine Clearance: 31.7 mL/min (A) (based on SCr of 1.8 mg/dL (H)).,     Dialysis Method (if applicable):  N/A    CBC (last 72 hours):  Recent Labs   Lab Result Units 08/19/24  0530 08/20/24  0439 08/21/24  0431   WBC K/uL 7.12 7.93  7.72   Hemoglobin g/dL 9.0* 9.4* 10.1*   Hematocrit % 28.6* 29.4* 33.9*   Platelets K/uL 166 174 199   Gran % % 70.5 64.7 67.7   Lymph % % 16.0* 20.8 17.4*   Mono % % 9.8 9.1 9.1   Eosinophil % % 2.4 4.2 4.1   Basophil % % 0.6 0.6 0.4   Differential Method  Automated Automated Automated       Metabolic Panel (last 72 hours):  Recent Labs   Lab Result Units 08/19/24  0530 08/20/24  0439 08/21/24  0431   Sodium mmol/L 137 137 136   Potassium mmol/L 4.1 3.9 3.9   Chloride mmol/L 109 108 105   CO2 mmol/L 21* 23 22*   Glucose mg/dL 129* 64* 67*   BUN mg/dL 40* 40* 35*   Creatinine mg/dL 1.7* 1.7* 1.8*   Albumin g/dL 1.5* 1.4* 1.6*   Total Bilirubin mg/dL 0.3 0.3 0.3   Alkaline Phosphatase U/L 132 140* 175*   AST U/L 42* 45* 49*   ALT U/L 9* 15 18   Magnesium mg/dL 1.8 2.1 2.2   Phosphorus mg/dL 3.4 3.9 3.8       Vancomycin Administrations:  vancomycin given in the last 96 hours                     vancomycin (VANCOCIN) 500 mg in D5W 100 mL IVPB (MB+) (mg) 500 mg New Bag 08/20/24 0838    vancomycin (VANCOCIN) 500 mg in D5W 100 mL IVPB (MB+) (mg) 500 mg New Bag 08/19/24 0039    vancomycin 1,250 mg in D5W 250 mL IVPB (Vial-Mate) (mg) 1,250 mg New Bag 08/17/24 1028                    Microbiologic Results:  Microbiology Results (last 7 days)       Procedure Component Value Units Date/Time    Urine culture [[id number]]  (Abnormal)  (Susceptibility) Collected: 08/14/24 0229    Order Status: Completed Specimen: Urine Updated: 08/16/24 1351     Urine Culture, Routine ESCHERICHIA COLI  >100,000 cfu/ml        ENTEROCOCCUS FAECALIS  > 100,000 cfu/ml      Narrative:      Specimen Source->Urine

## 2024-08-21 NOTE — PLAN OF CARE
08/21/24 1325   Post-Acute Status   Post-Acute Authorization Placement   Post-Acute Placement Status Set-up Complete/Auth obtained   Hospital Resources/Appts/Education Provided Provided patient/caregiver with written discharge plan information   Discharge Plan   Discharge Plan A Skilled Nursing Facility     Patient's set-up Complete. PFC order placed for Ambulance transport to [facility] at 230PM. Patient going to room 129. Bedside nurse to call report to [phone] ext [phone] Nurse is Coco. Updated patient and daughter at bedside regarding transportation details.      Karmen Hackett RNCM  Case Management  [facility]  [phone]

## 2024-08-21 NOTE — HOSPITAL COURSE
R Ankle ORIF by Dr. Davalos 8/14. Plan for surgical fixation of the pelvis 8/16. Interim DVT Prophylaxis with heparin. Mag repleted and increased to 2.2 from 1.5 on admit. Supplementing chronically low bicarb in setting of CKD4. Hgb decreased to 6.8 8/17; transfusion initiated. Hgb 8/18 increased to 10. UA equivocal for UTI; culture positive for E coli, E faecalis. Started on Rocephin, vancomycin 8/17 as patient is allergic to sulfa antibiotics. 8/17 patient was found to be delirious, significantly altered from baseline. Alert and oriented to person only. She attempted to pull her lines out necessitating restraints. Restraints were removed the next morning, 8/18, when patient was found to be more oriented, able to accurately converse about personal facts (recent surgeries, home, daughters, hobbies, etc) that she had described earlier in the admission. She was more altered later in the day after what sounded like a possible panic attack, and restraints had to be replaced mid-day as she started pulling out her lines in confusion. She responded somewhat to 0.5mg ativan for the panic attack. With delirium later, given Zyprexa 5mg (no results), then IV benadryl (eyelids heavy but fighting sleep). She became more altered and agitated around 18:30, and was given single dose of 10 Zyprexa. Her daughters were in the room and reported that at various times their mother had described seeing a strange man in the corner of the room, a baby, cats, and was heard to be conversing with the walls. Witnessed by physician and nursing staff. Delirium was felt to be the consequence of her known anxiety, very poor sleep in the hospital, and 2 UTIs currently undergoing treatment. Delirium precautions in place. Prioritizing chemical restraints first, physical only if chemical fail. Psych consulted to assist with evaluation of delirium and recommendations for sleep, delirium PRNs. Recommended depakote TID, Seroquel, and melatonin with vast  improvement in sleep, mood, and mental status since. Restraints removed AM 8/19. Returned to baseline per family and staff 8/19, 8/20. Ms. [place] alert and oriented this morning 8/21, lying comfortably in bed and feeling much more herself. Calm and conversant.  She reports another fair night of sleep which has immensely improved her mood and clarity. Restraints remain off with no issues. Recommending to continue at SNF on eventual discharge given mobility restrictions with R ankle.  Psych recommending continue current meds at SNF.   Low oral intake 8/18, 8/19. Reported stomach burning, and asked for Maalox/Tums which were given with relief. Glucose control has been difficult in setting of low intake, but as her mood and acutity have improved, so has her appetite. She is eating more and her sugars have rebounded to around 120. Will likely continue to improve as she gets into normal routine. While sugars are in range, holding novolog. Decreased lantus as morning glucose has been repeatedly low; will need to continue to titrate outpatient. Postoperative constipation. Suppository attempted 8/18 but patient reportedly denied. Completed 8/19 and BM 8/20. Benoit out, void trial 8/20. Hypocalcemia, replacing starting 8/18. Hgb stable at 9.4. Cr and eGFR at baseline. Ongoing tremor; continue to hold gabapentin in case it is a contributor and the patient feels she has a bad reaction (mood) to gabapentin anyway. Tremor is felt to be more likely anxiety related. Completed extended course of antibiotics (vancomycin) for UTI 8/21.   Post-op splint on R ankle changed to fiberglass cast today before discharge per orthopedics. Scheduled follow up in ortho clinic. Discharge to SNF today.

## 2024-08-21 NOTE — DISCHARGE SUMMARY
"DISCHARGE SUMMARY  Hospital Medicine    Team: [facility]    Patient Name: Lorena Contreras  YOB: 1950    Admit Date: 8/13/2024    Discharge Date: 08/21/2024    Discharge Attending Physician: Rupal Ochoa MD     Principal Diagnoses:  Active Hospital Problems    Diagnosis  POA    *Closed right pilon fracture [S82.871A]  Yes    Delirium [R41.0]  No    Constipation [K59.00]  No    Abdominal pain [R10.9]  Yes    Hypocalcemia [E83.51]  No    Hypoalbuminemia [E88.09]  Yes    Tremor [R25.1]  Yes    Metabolic acidosis [E87.20]  Yes    Closed fracture of right iliac wing [S32.301A]  Yes    Multiple fractures of pelvis [S32.82XA]  Yes    Closed displaced fracture of right acetabulum [S32.401A]  Yes    Transaminitis [R74.01]  Yes    Closed fracture of superior and inferior rami of right pubis [S32.511A]  Yes    Acute blood loss anemia [D62]  Yes    Acute cystitis [N30.00]  Yes    KYA (acute kidney injury) [N17.9]  Yes    Chronic diastolic heart failure [I50.32]  Yes     Chronic     Noted on echo 11/18/2019:  Grade II (moderate) left ventricular diastolic dysfunction consistent with pseudonormalization         Stage 3a chronic kidney disease [N18.31]  Yes     Chronic    Hypomagnesemia [E83.42]  Yes    Coronary artery disease of native artery of native heart with stable angina pectoris [I25.118]  Yes     March 29, 2019:  [facility] and PCI of RCA -  "Patient has serial mid 99% eccentric lesions consistent with plaque rupture site and abnormal EKG findings.... We initially pre-dilated with 3.0 balloon.  We placed a 3.0 by 12 mm resolute kenya stent in the midportion of the vessel.  We overlapped that with a 3.5 x 15 mm resolute kenya stent in the more proximal portion mid RCA.  Good result was achieved with MADINA 3 flow through the vessel.  Lad has a mid 90% stenosis and the left circumflex as well as the long 95% lesion as well.   Cardiac catheterization 01/08/2020:  1.  Successful PCI of proximal ramus " with drug-eluting stent x1 (2.0 x 6 mm).  IVUS guidance was utilized for this PCI.  Post PCI IVUS demonstrated well-opposed and expanded stent.  MADINA 3 flow pre and post PCI.   2.  Successful PCI of circumflex with drug-eluting stent x1 (2.25 x 22 mm).  MADINA 3 flow pre and post PCI         Type 2 diabetes mellitus with stage 3 chronic kidney disease, with long-term current use of insulin [E11.22, N18.30, Z79.4]  Not Applicable     Chronic    Mixed hyperlipidemia [E78.2]  Yes    Primary hypertension [I10]  Yes    Anxiety [F41.9]  Yes     Chronic      Resolved Hospital Problems    Diagnosis Date Resolved POA    Closed fracture of right tibia and fibula [S82.201A, S82.401A] 08/14/2024 Yes       Discharged Condition:  improved    Interval history- doing very well today, best I've seen her look today. Sister at bedside. Glucose a little low this AM but in 100s on dexcom now. Dec insulin 5 U tonight and continuing to hold any eleanor novolog until appetite furhter improves and has sliding scale. She repots cards told her to stay on brilinta no matter what as initially planned to hold it until off eliquis and cont asa but switched plan on snf orders then to keep brilinta while on eilquis for 6 weeks and hold asa to avoid bleeding risk on triple therapy, then resume asa oct 27 once eliquis completes on oct 26 and she is on board with plan. She has coffee today which she loves and sister brought it. Labs stable. Hg 9.4. ortho changed into splint as planned today for discharge. Will have close f/u. No new issues reported and in great spirits.    Temp:  [97.6 °F (36.4 °C)-98.5 °F (36.9 °C)]   Pulse:  [78-91]   Resp:  [15-18]   BP: (105-187)/(51-96)   SpO2:  [93 %-99 %]      Physical Exam  Constitutional:       General: She is not in acute distress.     Appearance: Normal appearance. She is not ill-appearing.      Comments: . Much improved mental status. Best I've seen her look. Sister at bedside.    HENT:      Head: Normocephalic  and atraumatic.   Eyes:      Extraocular Movements: Extraocular movements intact.   Cardiovascular:      Rate and Rhythm: Normal rate and regular rhythm.      Heart sounds: Murmur heard.      No friction rub. No gallop.   Pulmonary:      Effort: Pulmonary effort is normal.      Breath sounds: Normal breath sounds. No wheezing, rhonchi or rales.   Abdominal:      General: There is no distension.      Tenderness: There is no abdominal tenderness. There is no guarding.   Musculoskeletal:         General: Tenderness and signs of injury present.      Right lower leg: No edema.      Left lower leg: No edema.      Comments: Bandagse to right ankle and right LE post op.  TTP over R hip  Sensation diminished to toes 2/2 neuropathy (chronic)   Neurological:      General: No focal deficit present.      Mental Status: She is alert and oriented to person, place, and time.      Comments:  Oriented to place, situation, surgery, hospital. Mental status much better and sustained at baseline     HOSPITAL COURSE:      Initial Presentation:    Lorena Contreras is 72 y/o with PMHx of DM2, Fibromyalgia, HTN, HLD, CVA, MI, and CAD presents to ED via EMS as a [place] transfer for a fall. Pt was walking into grocery store when she twisted her ankle and fell despite attempts to catch herself. Denies LOC or head trauma. Patient fell on her right side. Had immediate pain to her ankle and hip. Pt was unable to ambulate due to pain. Pain is worse in her ankle. States it has subsided s/p pain medications. Pain has been exacerbated when moving. Had an episode of vomiting while splinting ankle but otherwise no further episodes. Denies fever, chills, chest pain, SOB, abdominal pain and urinary symptoms. Has numbness/ tingling to bilateral feet which she states is chronic 2/2 neuropathy.     In the ED: Afebrile, VSS. H&H 10.9 & 33.2. Cr 1.9. Glucose 348. LFTs AST 50, ALT 45. . Urine and drug toxicology screening pending. Chest Xray  impression was interstitial findings may reflect edema versus chronic change, no large focal consolidation. Right Ankle Xray impression was distal tibial and fibular fractures as described. Right Hip Xray impression was fractures of the right inferior and superior pubic rami. There is cortical irregularity involving the right iliac wing, concerning for additional fracture. Right Knee Xray showed  no acute displaced fracture or dislocation of the knee. CT Pelvis showed acute traumatic fractures involving the right iliac wing, the anterior pillar of the right acetabulum and the lateral most aspect of the right superior ramus, and the right inferior pubic ramus. No hip dislocation. CT Lumbar impression was There is no lumbar vertebral body fracture. There is grade 1 anterolisthesis of L4 on L5. At L3/L4, there is moderate central canal narrowing secondary to the facet arthrosis and ligamentum flavum hypertrophy without significant foraminal stenosis. At L4/L5, there is no stone significant central canal narrowing, but there is bilateral mild foraminal narrowing. Secondary to ligamentum flavum hypertrophy and facet arthrosis. At L5/S1, there is a broad-based disc bulge without any significant central canal stenosis or foraminal stenosis. Pain medications given in the ED. Ortho consulted. Admitted to .     Course of Principle Problem for Admission:    Closed right pilon fracture  Fall with ankle fx  -transferred here for ortho  -s/p Open reduction internal fixation right ankle pilon fracture with fixation of tibia and fibula on 8/14. NWB for 10 weeks  Per ortho:    We will remove splint, change dressing, and place a well-padded short-leg fiberglass cast prior to hospital discharge.  After incision healed, and sutures removed, likely 3 weeks postop, consider placement into a cam boot versus continuing the cast, depending on patient preference, inability to place an remove Cam boot  -pain control with multimodals, see  pelvic fx also        X-rays at subsequent followups:  Right ankle  Pelvis Judet     Follow-up postop  2-3 weeks - remove cast, remove sutures, consider short-leg cast versus Cam boot, based on patient's preference  6 weeks, 3 months, 6 months, 1 year     -pain control with multimodals, bowel regimen  -PT/OT- SNF rec.  -eliquis for dvt ppx now post op from pelvic surgery  for 10 weeks while NWB- oct 26 end date        Abdominal pain  Has chronic issues, takes maalox at home, reports having here burning with eating and requests and added scheduled now with tums prn  Constipation also making her feel full, suppository added 8/18 not given until 8/19 and successful with improvement  Maalox PRN on dc        Constipation  Abd pain so avoiding extra oral regimen 8/18 and ageeeable to suppository-- wasn't given for ucnlear reasons 8/18 so given thsi Am with nursing. If not resolved will do enema  Successful bms 8/20        Delirium  Present immediately post op in pacu and has been on/off with severe anxiety but now is full delirium with hallucinating and talking to the wall and seeing people/babies. Has UTIS on treatment but hasn't slept in over 48 hours and PRNS not improving with probably worsening delirium the last 24 hours, minimal responsive to zyprexa x2, iv benadryl once, and small dose ativan on 7/17 to try to calm and help sleep.  Psych consulted 8/18 to help with PRNS and sleep to try to get her on meds to promote her to sleep and for agitation until can get back to baseline while treating medical contributors as well  Psych rec depakoate IV TID + melatonin/seroquel scheduled qhs. Apprecaite recs, as patient now with finally improved delirium 8/19 after sleeping.  Resolved and still doing well 8/20. Psych reports can go to oral depakoate at snf and if doing well can stop there but would cont for now ond c. Would keep on seroquel/melatonin qhs as has had chronic sleep issues  -doing very well on dc without  issues        Hypoalbuminemia  Start boost glucose control and cont at snf        Hypocalcemia  Hypocalcemia is likely due to  secondary to poor intake and with low albumin lower than true result, with corrected is 8.4 and still low so start replacement . The most recent calcium and albumin results listed below.        Recent Labs     08/15/24  0457 08/16/24  0456 08/17/24  0430   CALCIUM 8.0* 8.0* 6.2*   ALBUMIN 2.2* 2.0* 1.3*      Plan  - Will replace calcium and monitor electrolytes closely.  - The patient's hypocalcemia is stable  - replace           Metabolic acidosis  Na bicarb started, resolved before dc and stop on dc        Tremor  Suspect possible from gabapentin as exam with some spotnaneous myoclonic jerking and hyperreflexic on exam and has been intermittent and unclear of timing, has seen neuro in clinic and had parkinsons ruled out she said but no further work up into cause  -hold gabapentin now, as variable cr may have worsened symptoms if related to this and see if improves, may take 48 hours to improve if so  Still present but worse Cr so may take more time to clear gabapentin if from this  -improved on dc, hold gabapentin long term           Acute blood loss anemia  Anemia is likely due to acute blood loss which was from surgery . Most recent hemoglobin and hematocrit are listed below.        Recent Labs     08/17/24  0430 08/18/24  0548 08/19/24  0530   HGB 6.8* 10.8* 9.0*   HCT 20.9* 32.4* 28.6*         Plan  - Monitor serial CBC: Daily  - Transfuse PRBC if patient becomes hemodynamically unstable, symptomatic or H/H drops below 7/21.  - Patient has received 2 units of PRBCs on 8/17  - Patient's anemia is currently  downtrend post op  Expected with large surgery  Improved to 10 on 8/18 now after 2 U-->9        Transaminitis  - AST/ ALT mildly elevated  - hold statin  -slightly up at 50--42 and stable     Multiple fractures of pelvis  Closed fracture of right iliac wing  Fracture of acetabulum  -  AF, VSS  - Xray Hip showed fractures of the right inferior and superior pubic rami   - CT Pelvis showed acute traumatic fractures involving the right iliac wing, the anterior pillar of the right acetabulum and the lateral most aspect of the right superior ramus, and the right inferior pubic ramus   - Ortho consulted- and OR 8/16 with jade with Open reduction internal fixation right both column acetabulum fracture . Multimodal plan control, eliquis started post op for dvt ppx- ortho recs for 10 weeks post op- October 26th end date  -10 weeks NWB to RLE given ankle fx also present now  -PT/OT, plan [facility] SNF 8/21 [facility]        Acute cystitis  Urine from [facility] resulted 8/16 with E. Faecalis and ecoli late so was not initially on treatment, may be cause for continued confusion and not totally herself since admit her daughter has reported, start vanc for e faecalis and rocephin for ecoli as its resistant to ampicilin so can't use that to cover both unfortunately  Plan for 5 day course given extended delirium, so katy plan until 8/20 and stopped on discharge        KYA (acute kidney injury)  KYA is likely due to pre-renal azotemia due to intravascular volume depletion. Baseline creatinine is  1.6 to 1.8 . Most recent creatinine and eGFR are listed below.        Recent Labs     08/17/24  0430 08/18/24  0548 08/19/24  0530   CREATININE 1.7* 1.9* 1.7*   EGFRNORACEVR 31.5* 27.5* 31.5*          Plan  - KYA is improving Cr 1.7 today and in baseine ranges after IVF down from 2.4, slightly back up 8/18 at 1.9, and monitor, oral intake starting to improve a little-- 1.7  - Avoid nephrotoxins and renally dose meds for GFR listed above  - Monitor urine output, serial BMP, and adjust therapy as needed     -      Chronic diastolic heart failure  - euvolemic on exam  -   - CXR showed interstitial findings may reflect edema versus chronic change, no pleural effusion  - pt is not on a diuretic at this time  - continue  to monitor volume status closely  Results for orders placed during the hospital encounter of 11/03/23     Echo     Interpretation Summary    Left Ventricle: The left ventricle is normal in size. Increased wall thickness. Moderate septal thickening. Normal wall motion. There is normal systolic function with a visually estimated ejection fraction of 65 - 70%. Grade I diastolic dysfunction.    Right Ventricle: Normal right ventricular cavity size. Systolic function is normal.    Left Atrium: Left atrium is severely dilated.    Aortic Valve: There is moderate stenosis. Aortic valve area by VTI is 1.02 cm². Aortic valve peak velocity is 2.59 m/s. Mean gradient is 18 mmHg. The dimensionless index is 0.32.    Mitral Valve: There is mild regurgitation.    Tricuspid Valve: There is mild regurgitation.    Pulmonary Artery: The estimated pulmonary artery systolic pressure is 35 mmHg.    IVC/SVC: Normal venous pressure at 3 mmHg.           Stage 3a chronic kidney disease  Creatine stable for now. BMP reviewed- noted Estimated Creatinine Clearance: 33.6 mL/min (A) (based on SCr of 1.7 mg/dL (H)). according to latest data. Based on current GFR, CKD stage is stage 4 - GFR 15-29.  Monitor UOP and serial BMP and adjust therapy as needed. Renally dose meds. Avoid nephrotoxic medications and procedures.  - Cr 1.9 (near most recent baseline which is 1.6 to 1.8) on admit but 2.2 now so will do light IVF on 8/15 but slightly worse at 2.4 now with bobbi and closer to baseline 8/17 at 1.7-->1.9--1.7 now       Hypomagnesemia  Patient has Abnormal Magnesium: hypomagnesemia. Will continue to monitor electrolytes closely. Will replace the affected electrolytes and repeat labs to be done after interventions completed. The patient's magnesium results have been reviewed and are listed below.      Recent Labs   Lab 08/15/24  0457   MG 2.2         Coronary artery disease of native artery of native heart with stable angina pectoris  Patient with known  CAD which is controlled Will continue Statin and monitor for S/Sx of angina/ACS. Continue to monitor on telemetry.   Had stents placed in 2018 to rca and 2020 to 2 areas per dr cervantes note from [facility], had stress in 2022 that was negative for ischemia. She said he wanted to recheck another one recently but insurance did not cover. She said had aspirin allergy testing before it was restarted due to prev intolerance after a stent. Resume asa  She reports cards told her dont ever hold brillinta over asa, so on dc will continue brillitna and hold asa while on eliquis to avoid triple therapy, and rseume asa on oct 27th, when eliquis ends day before and she is aware of plans and placed on SNF orders also     Type 2 diabetes mellitus with stage 3 chronic kidney disease, with long-term current use of insulin  Hard to titrate at baseline and had to have doctors adjust a lot, runs very high at times up to 1100 she said once even.   Titrate 8/15, running higher as didn't get long acting yesterday and adjusted today and will adjsut further. Has some allergies to some insulins. On tresiba at home.  - low dose SSI  - diabetic diet  - POCT checks  Baseline runs very high per dr schmidt notes she sees with endocrine outpatient. Baseline average 253 glucose on dexcom he reports. Dexcom removed for OR. On drip in OR wasn't given llantus this AM though. Will incease dose to giev now in pacu and inc to moderate sliding scale and inc novolog and will likely titrate here as not contolled at home.  Glucose in 100s on 8/17 after 28 U lantus last night and moderate slidign scale. Isnt eating much on ful liquids now, so hold novolog unless glucose is >160 with meals and eating at least half of meal if is in 100s but she tends to run higher even when NPo yesterday was 300s in pacu so titrate further based on trending today and will watch closely. Has had no hypoglycemia on dexcom per above.  Glucose better but not eating, just starting to  "eat 8/18 so will likely need to titrate more  Improved 8/19, glucose lwoer 100s, holding novolgo with breakfast as didn't eat too much, nursing comm to hold novolog if eats less than half meal and glucose under 160 and titrate down dose also while is not eating as much   Glucose 60s this am 8/20 so stop novolog for now and will add back pending trends with sliding scale prn, dec levemir a few u tonight, eating better so likely will trend back up  Lower 8/21 in AM now in 100, dec lantus 5 U tonight, and cont to hold any novolog scheduled at [facility]      Mixed hyperlipidemia  - continue home statin     Primary hypertension  - Chronic, controlled  - continue home imdur and metoprolol-- inc imdur 8/18     Anxiety  - hx noted  - continue home fluoxetine   High anxiety at baseline per her and gaurav, doctors have been hesitant to do acute anxiety meds due to fall risk they said, was not on on admit, high anxiety 8/15, will try ativan x 1 as suspect having possible panic attack after therapy sesion based on nusring description and did better after this  Very anxious in pacu and anesthesia bedside, giving precedex x 1 to relex,and required zyprexa on 8/16 PM and restraints to calm down as confused and anxious.  Will give ativan once as showing signs 8/17 of panic with chest pressure and tingling.  Now more delirious in later 8/17 pm and 8/18 worsening, talking to wall/etc. See delirium  Improved now 8/19, near baseilne now and doing very well on dc       Consults: ortho, psych    Last CBC/BMP:    CBC/Anemia Labs: Coags:    Recent Labs   Lab 08/19/24  0530 08/20/24  0439 08/21/24  0431   WBC 7.12 7.93 7.72   HGB 9.0* 9.4* 10.1*   HCT 28.6* 29.4* 33.9*    174 199   MCV 89 90 96   RDW 15.9* 16.0* 16.1*    No results for input(s): "PT", "INR", "APTT" in the last 168 hours.     Chemistries:   Recent Labs   Lab 08/19/24  0530 08/20/24  0439 08/21/24  0431    137 136   K 4.1 3.9 3.9    108 105   CO2 21* 23 22* "   BUN 40* 40* 35*   CREATININE 1.7* 1.7* 1.8*   CALCIUM 7.9* 8.0* 8.1*   PROT 5.0* 5.0* 5.3*   BILITOT 0.3 0.3 0.3   ALKPHOS 132 140* 175*   ALT 9* 15 18   AST 42* 45* 49*   MG 1.8 2.1 2.2   PHOS 3.4 3.9 3.8              Special Treatments/Procedures:   Procedure(s) (LRB):  ORIF, FRACTURE, ACETABULUM (Right)     Disposition: Skilled Nursing Facility      Future Scheduled Appointments:  Future Appointments   Date Time Provider Department Center   9/3/2024 10:45 AM Raheem Bucio, NP [facility] [address] [place]   9/25/2024  2:30 PM [facility] [address] [place]   10/24/2024  9:40 AM Jorge Paris MD [facility] [place]   11/6/2024 11:00 AM LAB, [facility] [place]   11/13/2024  3:00 PM Gerardo Barbour MD [facility]         Discharge Medication List:         Medication List        START taking these medications      acetaminophen 500 MG tablet  Commonly known as: TYLENOL  Take 1 tablet (500 mg total) by mouth every 8 (eight) hours.     aluminum & magnesium hydroxide-simethicone 400-400-40 mg/5 mL suspension  Commonly known as: MYLANTA MAX STRENGTH  Take 30 mLs by mouth every 6 (six) hours as needed for Indigestion.     apixaban 2.5 mg Tab  Commonly known as: ELIQUIS  Take 1 tablet (2.5 mg total) by mouth 2 (two) times daily. End date October 26th 2024     CALCIUM ANTACID 300 mg (750 mg) chewable tablet  Generic drug: calcium carbonate  Take 1 750 mg tablet daily     divalproex 125 mg capsule  Commonly known as: DEPAKOTE SPRINKLES  Take 2 capsules (250 mg total) by mouth every 8 (eight) hours. Per psych MD can stop while at SNF if doing well without any altered mental status while there but continue on discharge from hospital for now     hydrOXYzine HCL 25 MG tablet  Commonly known as: ATARAX  Take 1 tablet (25 mg total) by mouth 3 (three) times daily as needed for Itching.     melatonin 3 mg tablet  Commonly known as: MELATIN  Take 2 tablets (6 mg total) by mouth  nightly.  Replaces: melatonin 10 mg Cap     methocarbamoL 500 MG Tab  Commonly known as: ROBAXIN  Take 1 tablet (500 mg total) by mouth 4 (four) times daily.     ondansetron 8 MG Tbdl  Commonly known as: ZOFRAN-ODT  Take 1 tablet (8 mg total) by mouth every 8 (eight) hours as needed (nausea).     * oxyCODONE 5 MG immediate release tablet  Commonly known as: ROXICODONE  Take 1 tablet (5 mg total) by mouth every 6 (six) hours as needed (pain scale 4-6).     * oxyCODONE 10 mg Tab immediate release tablet  Commonly known as: ROXICODONE  Take 1 tablet (10 mg total) by mouth every 6 (six) hours as needed (pain scale 7-10).     polyethylene glycol 17 gram Pwpk  Commonly known as: GLYCOLAX  Take 17 g by mouth daily as needed for Constipation.     QUEtiapine 50 MG tablet  Commonly known as: SEROQUEL  Take 1 tablet (50 mg total) by mouth every evening.           * This list has 2 medication(s) that are the same as other medications prescribed for you. Read the directions carefully, and ask your doctor or other care provider to review them with you.                CHANGE how you take these medications      aspirin 81 MG Chew  Take 1 tablet (81 mg total) by mouth once daily. Hold to avoid bleed risk on triple therapy and then resume on oct 27 once eliquis stops on oct 26th  What changed: additional instructions     insulin aspart U-100 100 unit/mL (3 mL) Inpn pen  Commonly known as: NovoLOG  Inject 0-10 Units into the skin before meals and at bedtime as needed (Hyperglycemia).  What changed:   how much to take  when to take this  reasons to take this     insulin glargine U-100 (Lantus) 100 unit/mL (3 mL) Inpn pen  Inject 19 Units into the skin every evening.  What changed: how much to take     isosorbide mononitrate 60 MG 24 hr tablet  Commonly known as: IMDUR  Take 1 tablet (60 mg total) by mouth once daily.  What changed:   medication strength  how much to take     * OZEMPIC 1 mg/dose (4 mg/3 mL)  Generic drug:  "semaglutide  Inject 1 mg into the skin every 7 days. HOLD while at [facility]  What changed: additional instructions     * OZEMPIC 0.25 mg or 0.5 mg (2 mg/3 mL) pen injector  Generic drug: semaglutide  Inject 0.5 mg once weekly thereafter    HOLD at [facility]  What changed: additional instructions           * This list has 2 medication(s) that are the same as other medications prescribed for you. Read the directions carefully, and ask your doctor or other care provider to review them with you.                CONTINUE taking these medications      albuterol 90 mcg/actuation inhaler  Commonly known as: PROVENTIL/VENTOLIN HFA  Inhale 2 puffs into the lungs every 6 (six) hours as needed for Wheezing or Shortness of Breath.     atorvastatin 80 MG tablet  Commonly known as: LIPITOR  Take 1 tablet by mouth in the evening     DEXCOM G7  Misc  Generic drug: blood-glucose meter,continuous  Use 1  to track blood glucose, ICD10: E11.65     DEXCOM G7 SENSOR Samina  Generic drug: blood-glucose sensor  Use 1 sensor every 10 days to track blood glucose, ICD10: E11.65, okay with 90 day supply if possible     FLUoxetine 40 MG capsule  Take 1 capsule (40 mg total) by mouth once daily.     metoprolol succinate 25 MG 24 hr tablet  Commonly known as: TOPROL-XL  Take 1 tablet (25 mg total) by mouth once daily.     pantoprazole 40 MG tablet  Commonly known as: PROTONIX  Take 1 tablet (40 mg total) by mouth once daily.     pen needle, diabetic 32 gauge x 5/32" Ndle  Commonly known as: BD ULTRA-FINE SAGAR PEN NEEDLE  One pen needle use with insulin pen 4 times a day.  ICD-10: E11.9     ticagrelor 90 mg tablet  Commonly known as: BRILINTA  Take 1 tablet (90 mg total) by mouth 2 (two) times daily.            STOP taking these medications      ASCORBIC ACID (VITAMIN C) ORAL     CENTRUM COMPLETE ORAL     cholecalciferol (vitamin D3) 50 mcg (2,000 unit) Cap capsule  Commonly known as: VITAMIN D3     cyanocobalamin 1000 MCG tablet  Commonly known " as: VITAMIN B-12     DIGESTIVE ADVANTAGE IMMUNE ORAL     EPINEPHrine 0.3 mg/0.3 mL Atin  Commonly known as: EPIPEN     ESOMEPRAZOLE MAGNESIUM ORAL     gabapentin 300 MG capsule  Commonly known as: NEURONTIN     hydrALAZINE 50 MG tablet  Commonly known as: APRESOLINE     LIDOcaine 5 %  Commonly known as: LIDODERM     LORazepam 1 MG tablet  Commonly known as: ATIVAN     magnesium oxide 400 mg (241.3 mg magnesium) tablet  Commonly known as: MAG-OX     melatonin 10 mg Cap  Replaced by: melatonin 3 mg tablet     vitamin E 1000 UNIT capsule     [facility]               Where to Get Your Medications        You can get these medications from any pharmacy    Bring a paper prescription for each of these medications  oxyCODONE 10 mg Tab immediate release tablet  oxyCODONE 5 MG immediate release tablet       Information about where to get these medications is not yet available    Ask your nurse or doctor about these medications  acetaminophen 500 MG tablet  aluminum & magnesium hydroxide-simethicone 400-400-40 mg/5 mL suspension  apixaban 2.5 mg Tab  aspirin 81 MG Chew  CALCIUM ANTACID 300 mg (750 mg) chewable tablet  divalproex 125 mg capsule  hydrOXYzine HCL 25 MG tablet  insulin aspart U-100 100 unit/mL (3 mL) Inpn pen  insulin glargine U-100 (Lantus) 100 unit/mL (3 mL) Inpn pen  isosorbide mononitrate 60 MG 24 hr tablet  melatonin 3 mg tablet  methocarbamoL 500 MG Tab  ondansetron 8 MG Tbdl  OZEMPIC 0.25 mg or 0.5 mg (2 mg/3 mL) pen injector  OZEMPIC 1 mg/dose (4 mg/3 mL)  polyethylene glycol 17 gram Pwpk  QUEtiapine 50 MG tablet  ticagrelor 90 mg tablet         Patient Instructions:  Discharge Procedure Orders   Ambulatory referral/consult to Outpatient Case Management   Referral Priority: Routine Referral Type: Consultation   Referral Reason: Specialty Services Required   Number of Visits Requested: 1       At the time of discharge patient was told to take all medications as prescribed, to keep all followup  appointments, and to call their primary care physician or return to the emergency room if they have any worsening or concerning symptoms.      I spent 40 min preparing the discharge      Signing Physician:  Rupal Ochoa MD

## 2024-08-21 NOTE — NURSING
Bladder scan  performed showed  407 retention   performed straight cath resulted  in 600 or carmel urine notified provider on call

## 2024-08-21 NOTE — PROGRESS NOTES
[facility]  Orthopedics  Progress Note    Patient Name: Lorena Contreras  MRN: [id number]  Admission Date: 8/13/2024  Hospital Length of Stay: 6 days  Attending Provider: Rupal Ochoa MD  Primary Care Provider: Jorge Paris MD  Follow-up For: Procedure(s) (LRB):  ORIF, FRACTURE, ACETABULUM (Right)    Post-Operative Day: 4 Days Post-Op  Subjective:     Principal Problem:Closed right pilon fracture    Principal Orthopedic Problem: same + R both column acetabular fracture s/p ORIF pilon 8/14/24 and ORIF right acetabulum 8/16/24    Interval History: Pt seen and examined at bedside. Much more lucid today. She is A&Ox4. Denies any numbness or tingling. Reports pain to the right ankle but pain is controlled to the pelvis.     Review of patient's allergies indicates:   Allergen Reactions    Novolin 70/30 (semi-synthetic) Nausea And Vomiting     Severe vomiting on Relion 70/30    Sulfa (sulfonamide antibiotics) Anaphylaxis    Talwin [pentazocine lactate] Anaphylaxis    Victoza [liraglutide] Nausea And Vomiting    Glipizide Nausea Only    Citric acid     Codeine     Influenza virus vaccines Hives    Iodine and iodide containing products Hives    Levetiracetam Itching    Neurontin [gabapentin]      Possible associated myoclonic jerk    Rituxan [rituximab] Hives    Zoloft [sertraline] Nausea And Vomiting       Current Facility-Administered Medications   Medication    0.9%  NaCl infusion (for blood administration)    0.9%  NaCl infusion (for blood administration)    acetaminophen tablet 500 mg    albuterol-ipratropium 2.5 mg-0.5 mg/3 mL nebulizer solution 3 mL    aluminum & magnesium hydroxide-simethicone 400-400-40 mg/5 mL suspension 30 mL    apixaban tablet 2.5 mg    bisacodyL suppository 10 mg    bisacodyL suppository 10 mg    calcium carbonate 200 mg calcium (500 mg) chewable tablet 1,000 mg    calcium carbonate 200 mg calcium (500 mg) chewable tablet 500 mg    cefTRIAXone (Rocephin) 1 g in D5W 100 mL  "IVPB (MB+)    dextrose 10% bolus 125 mL 125 mL    dextrose 10% bolus 125 mL 125 mL    dextrose 10% bolus 125 mL 125 mL    dextrose 10% bolus 250 mL 250 mL    dextrose 10% bolus 250 mL 250 mL    dextrose 10% bolus 250 mL 250 mL    FLUoxetine capsule 40 mg    glucagon (human recombinant) injection 1 mg    glucagon (human recombinant) injection 1 mg    glucose chewable tablet 16 g    glucose chewable tablet 16 g    glucose chewable tablet 24 g    glucose chewable tablet 24 g    hydrOXYzine HCL tablet 25 mg    insulin aspart U-100 pen 0-10 Units    insulin glargine U-100 (Lantus) pen 24 Units    isosorbide mononitrate 24 hr tablet 60 mg    melatonin tablet 6 mg    methocarbamoL tablet 500 mg    metoprolol succinate (TOPROL-XL) 24 hr tablet 25 mg    ondansetron disintegrating tablet 8 mg    oxyCODONE immediate release tablet 5 mg    oxyCODONE immediate release tablet Tab 10 mg    pantoprazole EC tablet 40 mg    polyethylene glycol packet 17 g    promethazine tablet 25 mg    QUEtiapine tablet 50 mg    sodium bicarbonate tablet 650 mg    sodium chloride 0.9% flush 10 mL    valproate (DEPACON) 250 mg in D5W 50 mL IVPB    vancomycin - pharmacy to dose     Objective:     Vital Signs (Most Recent):  Temp: 98.4 °F (36.9 °C) (08/20/24 1528)  Pulse: 89 (08/20/24 1528)  Resp: 18 (08/20/24 1758)  BP: (!) 153/96 (08/20/24 1528)  SpO2: (!) 93 % (08/20/24 1528) Vital Signs (24h Range):  Temp:  [97 °F (36.1 °C)-98.9 °F (37.2 °C)] 98.4 °F (36.9 °C)  Pulse:  [81-91] 89  Resp:  [17-18] 18  SpO2:  [93 %-98 %] 93 %  BP: ()/(46-96) 153/96     Weight: 81.2 kg (179 lb 0.2 oz)  Height: 5' 9" (175.3 cm)  Body mass index is 26.44 kg/m².      Intake/Output Summary (Last 24 hours) at 8/20/2024 2036  Last data filed at 8/20/2024 1322  Gross per 24 hour   Intake --   Output 1050 ml   Net -1050 ml          Ortho/SPM Exam     Gen: NAD, WDWN  CV: peripherally well perfused  Resp: unlabored respirations, symmetric chest rise    MSK:  RLE:  Surgical " dressings over right hip and abdomen c/d/I  Posterior slab splint in place  Fires Quad, wiggles toes  SILT  Compartments soft  Brisk cap refill  Foot is WWP     Significant Labs: CBC:   Recent Labs   Lab 08/19/24  0530 08/20/24  0439   WBC 7.12 7.93   HGB 9.0* 9.4*   HCT 28.6* 29.4*    174     CMP:   Recent Labs   Lab 08/19/24  0530 08/20/24  0439    137   K 4.1 3.9    108   CO2 21* 23   * 64*   BUN 40* 40*   CREATININE 1.7* 1.7*   CALCIUM 7.9* 8.0*   PROT 5.0* 5.0*   ALBUMIN 1.5* 1.4*   BILITOT 0.3 0.3   ALKPHOS 132 140*   AST 42* 45*   ALT 9* 15   ANIONGAP 7* 6*     All pertinent labs within the past 24 hours have been reviewed.    Significant Imaging: I have reviewed all pertinent imaging results/findings.  Assessment/Plan:     * Closed right pilon fracture  Lorena Contreras is a 73 y.o. female with R pilon fracture s/p ORIF 8/14/24    NWB RLE  Eliquis 2.5 BID x 10 weeks  Remove splint, change dressing, and place a well-padded short-leg fiberglass cast prior to hospital discharge.          Closed displaced fracture of right acetabulum  S/p ORIF R acetabulum 8/16    - NWB RLE x 10 weeks  - Eliquis 2.5 BID x 10 weeks  - leave dressings dry and intact  - Rocephin for UTI             CARLOS Finney MD  Orthopedics  [facility] - Surgery

## 2024-08-21 NOTE — ASSESSMENT & PLAN NOTE
Lorena Contreras is a 73 y.o. female with R pilon fracture s/p ORIF 8/14/24    NWDIONTE RLAGGIE  Eliquis 2.5 BID x 10 weeks  Remove splint, change dressing, and place a well-padded short-leg fiberglass cast prior to hospital discharge.

## 2024-08-21 NOTE — ASSESSMENT & PLAN NOTE
Lorena Contreras is a 73 y.o. female with R pilon fracture s/p ORIF 8/14/24    NWB RLAGGIE  Eliquis 2.5 BID x 10 weeks  Removal of splint today and change to cast prior to discharge

## 2024-08-21 NOTE — ASSESSMENT & PLAN NOTE
S/p ORIF R acetabulum 8/16    - NWB RLE x 10 weeks  - Eliquis 2.5 BID x 10 weeks  - leave dressings dry and intact  - Rocephin for UTI

## 2024-08-21 NOTE — SUBJECTIVE & OBJECTIVE
Principal Problem:Closed right pilon fracture    Principal Orthopedic Problem: same + R both column acetabular fracture s/p ORIF pilon 8/14/24 and ORIF right acetabulum 8/16/24    Interval History: Pt seen and examined at bedside. Much more lucid today. She is A&Ox4. Denies any numbness or tingling. Reports pain to the right ankle but pain is controlled to the pelvis.     Review of patient's allergies indicates:   Allergen Reactions    Novolin 70/30 (semi-synthetic) Nausea And Vomiting     Severe vomiting on Relion 70/30    Sulfa (sulfonamide antibiotics) Anaphylaxis    Talwin [pentazocine lactate] Anaphylaxis    Victoza [liraglutide] Nausea And Vomiting    Glipizide Nausea Only    Citric acid     Codeine     Influenza virus vaccines Hives    Iodine and iodide containing products Hives    Levetiracetam Itching    Neurontin [gabapentin]      Possible associated myoclonic jerk    Rituxan [rituximab] Hives    Zoloft [sertraline] Nausea And Vomiting       Current Facility-Administered Medications   Medication    0.9%  NaCl infusion (for blood administration)    0.9%  NaCl infusion (for blood administration)    acetaminophen tablet 500 mg    albuterol-ipratropium 2.5 mg-0.5 mg/3 mL nebulizer solution 3 mL    aluminum & magnesium hydroxide-simethicone 400-400-40 mg/5 mL suspension 30 mL    apixaban tablet 2.5 mg    bisacodyL suppository 10 mg    bisacodyL suppository 10 mg    calcium carbonate 200 mg calcium (500 mg) chewable tablet 1,000 mg    calcium carbonate 200 mg calcium (500 mg) chewable tablet 500 mg    cefTRIAXone (Rocephin) 1 g in D5W 100 mL IVPB (MB+)    dextrose 10% bolus 125 mL 125 mL    dextrose 10% bolus 125 mL 125 mL    dextrose 10% bolus 125 mL 125 mL    dextrose 10% bolus 250 mL 250 mL    dextrose 10% bolus 250 mL 250 mL    dextrose 10% bolus 250 mL 250 mL    FLUoxetine capsule 40 mg    glucagon (human recombinant) injection 1 mg    glucagon (human recombinant) injection 1 mg    glucose chewable tablet  "16 g    glucose chewable tablet 16 g    glucose chewable tablet 24 g    glucose chewable tablet 24 g    hydrOXYzine HCL tablet 25 mg    insulin aspart U-100 pen 0-10 Units    insulin glargine U-100 (Lantus) pen 24 Units    isosorbide mononitrate 24 hr tablet 60 mg    melatonin tablet 6 mg    methocarbamoL tablet 500 mg    metoprolol succinate (TOPROL-XL) 24 hr tablet 25 mg    ondansetron disintegrating tablet 8 mg    oxyCODONE immediate release tablet 5 mg    oxyCODONE immediate release tablet Tab 10 mg    pantoprazole EC tablet 40 mg    polyethylene glycol packet 17 g    promethazine tablet 25 mg    QUEtiapine tablet 50 mg    sodium bicarbonate tablet 650 mg    sodium chloride 0.9% flush 10 mL    valproate (DEPACON) 250 mg in D5W 50 mL IVPB    vancomycin - pharmacy to dose     Objective:     Vital Signs (Most Recent):  Temp: 98.4 °F (36.9 °C) (08/20/24 1528)  Pulse: 89 (08/20/24 1528)  Resp: 18 (08/20/24 1758)  BP: (!) 153/96 (08/20/24 1528)  SpO2: (!) 93 % (08/20/24 1528) Vital Signs (24h Range):  Temp:  [97 °F (36.1 °C)-98.9 °F (37.2 °C)] 98.4 °F (36.9 °C)  Pulse:  [81-91] 89  Resp:  [17-18] 18  SpO2:  [93 %-98 %] 93 %  BP: ()/(46-96) 153/96     Weight: 81.2 kg (179 lb 0.2 oz)  Height: 5' 9" (175.3 cm)  Body mass index is 26.44 kg/m².      Intake/Output Summary (Last 24 hours) at 8/20/2024 2036  Last data filed at 8/20/2024 1322  Gross per 24 hour   Intake --   Output 1050 ml   Net -1050 ml          Ortho/SPM Exam     Gen: NAD, WDWN  CV: peripherally well perfused  Resp: unlabored respirations, symmetric chest rise    MSK:  RLE:  Surgical dressings over right hip and abdomen c/d/I  Posterior slab splint in place  Fires Quad, wiggles toes  SILT  Compartments soft  Brisk cap refill  Foot is WWP     Significant Labs: CBC:   Recent Labs   Lab 08/19/24  0530 08/20/24  0439   WBC 7.12 7.93   HGB 9.0* 9.4*   HCT 28.6* 29.4*    174     CMP:   Recent Labs   Lab 08/19/24  0530 08/20/24  0439    137   K " 4.1 3.9    108   CO2 21* 23   * 64*   BUN 40* 40*   CREATININE 1.7* 1.7*   CALCIUM 7.9* 8.0*   PROT 5.0* 5.0*   ALBUMIN 1.5* 1.4*   BILITOT 0.3 0.3   ALKPHOS 132 140*   AST 42* 45*   ALT 9* 15   ANIONGAP 7* 6*     All pertinent labs within the past 24 hours have been reviewed.    Significant Imaging: I have reviewed all pertinent imaging results/findings.

## 2024-08-21 NOTE — PLAN OF CARE
[facility]  Discharge Final Note    Primary Care Provider: Jorge Paris MD    Expected Discharge Date: 8/21/2024    Final Discharge Note (most recent)       Final Note - 08/21/24 1408          Final Note    Assessment Type Final Discharge Note     Anticipated Discharge Disposition Skilled Nursing Facility     What phone number can be called within the next 1-3 days to see how you are doing after discharge? --   [phone]       Post-Acute Status    Post-Acute Authorization Placement     Post-Acute Placement Status Set-up Complete/Auth obtained                     Important Message from Medicare  Important Message from Medicare regarding Discharge Appeal Rights: Given to patient/caregiver, Explained to patient/caregiver, Signed/date by patient/caregiver     Date IMM was signed: 08/21/24  Time IMM was signed: 1006    Contact Info       [facility] [address]   Specialty: Orthopedics    [address]  [facility] [address]   Phone: [phone]       Next Steps: Follow up    Instructions: 2 week follow up            Future Appointments   Date Time Provider Department Center   9/3/2024 10:45 AM Raheem Bucio NP [facility]   9/25/2024  2:30 PM Raheem Bucio NP [facility]   10/24/2024  9:40 AM Jorge Paris MD [facility]   11/6/2024 11:00 AM [facility] [place]   11/13/2024  3:00 PM Gerardo Barbour MD [facility]     Patient discharged to [facility] on 8/21/24.      Karmen Hackett RNCM  Case Management  [facility]  [phone]

## 2024-08-21 NOTE — MEDICAL/APP STUDENT
[facility]  Discharge Summary      Patient Name: Lorena Contreras  MRN: [id number]  Admission Date: 8/13/2024  Hospital Length of Stay: 7 days  Discharge Date and Time:  08/21/2024 9:43 AM  Attending Physician: Rupal Ochoa MD   Discharging Provider: Heavenly Yeboah  Primary Care Provider: Jorge Paris MD    HPI: Lorena Contreras is 74 y/o with PMHx of DM2, Fibromyalgia, HTN, HLD, CVA, MI, and CAD presents to ED via EMS as a [place] transfer for a fall. Pt was walking into grocery store when she twisted her ankle and fell despite attempts to catch herself. Denies LOC or head trauma. Patient fell on her right side. Had immediate pain to her ankle and hip. Pt was unable to ambulate due to pain. Pain is worse in her ankle. States it has subsided s/p pain medications. Pain has been exacerbated when moving. Had an episode of vomiting while splinting ankle but otherwise no further episodes. Denies fever, chills, chest pain, SOB, abdominal pain and urinary symptoms. Has numbness/ tingling to bilateral feet which she states is chronic 2/2 neuropathy.    In the ED: Afebrile, VSS. H&H 10.9 & 33.2. Cr 1.9. Glucose 348. LFTs AST 50, ALT 45. . Urine and drug toxicology screening pending. Chest Xray impression was interstitial findings may reflect edema versus chronic change, no large focal consolidation. Right Ankle Xray impression was distal tibial and fibular fractures as described. Right Hip Xray impression was fractures of the right inferior and superior pubic rami. There is cortical irregularity involving the right iliac wing, concerning for additional fracture. Right Knee Xray showed  no acute displaced fracture or dislocation of the knee. CT Pelvis showed acute traumatic fractures involving the right iliac wing, the anterior pillar of the right acetabulum and the lateral most aspect of the right superior ramus, and the right inferior pubic ramus. No hip dislocation. CT Lumbar  impression was There is no lumbar vertebral body fracture. There is grade 1 anterolisthesis of L4 on L5. At L3/L4, there is moderate central canal narrowing secondary to the facet arthrosis and ligamentum flavum hypertrophy without significant foraminal stenosis. At L4/L5, there is no stone significant central canal narrowing, but there is bilateral mild foraminal narrowing. Secondary to ligamentum flavum hypertrophy and facet arthrosis. At L5/S1, there is a broad-based disc bulge without any significant central canal stenosis or foraminal stenosis. Pain medications given in the ED. Ortho consulted. Admitted to .        Procedure(s) (LRB):  ORIF, FRACTURE, ACETABULUM (Right)   ORIF, FRACTURE, ANKLE (Right)  Indwelling Lines/Drains at time of discharge:   Lines/Drains/Airways       Drain  Duration                  Urethral Catheter 08/21/24 0754 Double-lumen <1 day                  Hospital Course: R Ankle ORIF by Dr. Davalos 8/14. Plan for surgical fixation of the pelvis 8/16. Interim DVT Prophylaxis with heparin. Mag repleted and increased to 2.2 from 1.5 on admit. Supplementing chronically low bicarb in setting of CKD4. Hgb decreased to 6.8 8/17; transfusion initiated. Hgb 8/18 increased to 10. UA equivocal for UTI; culture positive for E coli, E faecalis. Started on Rocephin, vancomycin 8/17 as patient is allergic to sulfa antibiotics. 8/17 patient was found to be delirious, significantly altered from baseline. Alert and oriented to person only. She attempted to pull her lines out necessitating restraints. Restraints were removed the next morning, 8/18, when patient was found to be more oriented, able to accurately converse about personal facts (recent surgeries, home, daughters, hobbies, etc) that she had described earlier in the admission. She was more altered later in the day after what sounded like a possible panic attack, and restraints had to be replaced mid-day as she started pulling out her lines in  confusion. She responded somewhat to 0.5mg ativan for the panic attack. With delirium later, given Zyprexa 5mg (no results), then IV benadryl (eyelids heavy but fighting sleep). She became more altered and agitated around 18:30, and was given single dose of 10 Zyprexa. Her daughters were in the room and reported that at various times their mother had described seeing a strange man in the corner of the room, a baby, cats, and was heard to be conversing with the walls. Witnessed by physician and nursing staff. Delirium was felt to be the consequence of her known anxiety, very poor sleep in the hospital, and 2 UTIs currently undergoing treatment. Delirium precautions in place. Prioritizing chemical restraints first, physical only if chemical fail. Psych consulted to assist with evaluation of delirium and recommendations for sleep, delirium PRNs. Recommended depakote TID, Seroquel, and melatonin with vast improvement in sleep, mood, and mental status since. Restraints removed AM 8/19. Returned to baseline per family and staff 8/19, 8/20. Ms. [place] alert and oriented this morning 8/21, lying comfortably in bed and feeling much more herself. Calm and conversant.  She reports another fair night of sleep which has immensely improved her mood and clarity. Restraints remain off with no issues. Recommending to continue at SNF on eventual discharge given mobility restrictions with R ankle.  Psych recommending continue current meds at SNF.   Low oral intake 8/18, 8/19. Reported stomach burning, and asked for Maalox/Tums which were given with relief. Glucose control has been difficult in setting of low intake, but as her mood and acutity have improved, so has her appetite. She is eating more and her sugars have rebounded to around 120. Will likely continue to improve as she gets into normal routine. While sugars are in range, holding novolog. Decreased lantus as morning glucose has been repeatedly low; will need to continue  "to titrate outpatient. Postoperative constipation. Suppository attempted 8/18 but patient reportedly denied. Completed 8/19 and BM 8/20. Benoit out, void trial 8/20. Hypocalcemia, replacing starting 8/18. Hgb stable at 9.4. Cr and eGFR at baseline. Ongoing tremor; continue to hold gabapentin in case it is a contributor and the patient feels she has a bad reaction (mood) to gabapentin anyway. Tremor is felt to be more likely anxiety related. Completed extended course of antibiotics (vancomycin) for UTI 8/21.   Post-op splint on R ankle changed to fiberglass cast today before discharge per orthopedics. Scheduled follow up in ortho clinic. Discharge to SNF today.      Consults:   Consults (From admission, onward)          Status Ordering Provider     Inpatient consult to Psychiatry  Once        Provider:  (Not yet assigned)    Completed JAN ACOSTA     Pharmacy to dose Vancomycin consult  Once        Provider:  (Not yet assigned)   Placed in "And" Linked Group    Acknowledged JAN ACOSTA     Inpatient consult to Orthopedic Surgery  Once        Provider:  (Not yet assigned)    Completed JUSTIN SALGADO            Significant Diagnostic Studies: Labs: CMP   Recent Labs   Lab 08/20/24  0439 08/21/24  0431    136   K 3.9 3.9    105   CO2 23 22*   GLU 64* 67*   BUN 40* 35*   CREATININE 1.7* 1.8*   CALCIUM 8.0* 8.1*   PROT 5.0* 5.3*   ALBUMIN 1.4* 1.6*   BILITOT 0.3 0.3   ALKPHOS 140* 175*   AST 45* 49*   ALT 15 18   ANIONGAP 6* 9   , CBC   Recent Labs   Lab 08/20/24  0439 08/21/24  0431   WBC 7.93 7.72   HGB 9.4* 10.1*   HCT 29.4* 33.9*    199   , A1C:   Recent Labs   Lab 03/06/24  1217 07/10/24  1105   HGBA1C 8.7* 8.2*   , and All labs within the past 24 hours have been reviewed  Microbiology: Blood Culture   Lab Results   Component Value Date    LABBLOO No Growth after 4 days. 04/10/2023    and Urine Culture    Lab Results   Component Value Date    LABURIN ESCHERICHIA COLI  >100,000 " cfu/ml   (A) 08/14/2024    LABURIN ENTEROCOCCUS FAECALIS  > 100,000 cfu/ml   (A) 08/14/2024     CXR 8/13:  Impression:   1. Interstitial findings may reflect edema versus chronic change, no large focal consolidation.      XR R Ankle 8/13:  Findings: There is osteopenia. There is acute appearing fracture involving the distal aspect of the fibula. There is comminuted fracture of the medial malleolus. The ankle mortise is intact. There is edema about the ankle. The posterior aspect of the tibia appears intact. There are degenerative changes of the calcaneus. There is vascular calcification.      XR Ankle 8/14 (pre-op)  Findings: Exam quality is limited by suboptimal positioning and overlapping cast material. Acute fractures of the distal tibia and distal fibula as seen previously. Bones are demineralized. No gross dislocation or significant worsening alignment of fracture fragments. Soft tissue edema.      CT Ankle 8/14 (pre-op)  Findings: Bones are demineralized. There is a cast in place. There is mildly displaced comminuted distal tibial fracture and a minimally displaced distal fibular fracture with medial angulation of the fracture fragment. Intra-articular extension fracture fragments which are mildly displaced involving the distal tibia near anatomic alignment of fracture fragments. Fractures involve the posterior and medial malleoli. No dislocation. There are a few foci of subcutaneous gas overlying the tibial fracture, possibly relating to trauma or reduction procedure though correlation is advised. Dense calcific tendinosis of the posterior tibial tendon. No full-thickness injury of the Achilles tendon. Soft tissue edema about the ankle. Prominent vascular calcifications noted.   Impression: Near anatomic alignment of complex mildly displaced distal tibia fracture and mildly displaced distal fibular fracture.      XR R Knee 8/13:  Findings: There is osteopenia. There are degenerative changes of the knee. There  is vascular calcification. No large right knee joint effusion.      XR R Hip 8/13:  Findings: The bilateral sacroiliac joints are intact. The pubic symphysis is intact. There is osteopenia. There are fractures of the right superior and inferior pubic rami. The bilateral femoroacetabular joints are intact noting degenerative change. There is fracture involving the right iliac wing.      CT Pelvis 8/13:  Findings: There is a minimally displaced fracture of the right iliac wing. There are comminuted fractures involving the anterior pillar of the right acetabulum and the lateral most aspect of the right superior ramus. There is comminuted and overlapping fracture of the right inferior pubic ramus. The hips are not dislocated. The SI joints are intact. There are degenerative changes seen at the sacroiliac joints and the pubic symphysis. Bones are osteopenic. Incidental note is made of vascular calcifications and a small fat containing umbilical hernia.   Impression: Acute traumatic fractures involving the right iliac wing, the anterior pillar of the right acetabulum and the lateral most aspect of the right superior ramus, and the right inferior pubic ramus. No hip dislocation.      CT L-Spine 8/13:   Findings: There is no lumbar vertebral body fracture. There is grade 1 anterolisthesis of L4 on L5. At L3/L4, there is moderate central canal narrowing secondary to the facet arthrosis and ligamentum flavum hypertrophy without significant foraminal stenosis. At L4/L5, there is no stone significant central canal narrowing, but there is bilateral mild foraminal narrowing. Secondary to ligamentum flavum hypertrophy and facet arthrosis. At L5/S1, there is a broad-based disc bulge without any significant central canal stenosis or foraminal stenosis. There is small marginal osteophytes throughout. Vacuum phenomena is seen at L4/L5 with mild intervertebral disc space narrowing. Incidental note is made of small bilateral pleural  effusions right greater than left and gallbladder sludge or gravel-like gallstones.   Impression: No acute lumbar fracture.  Multilevel degenerative change greatest at L4/L5. Small bilateral pleural effusions right greater than left. Gallbladder sludge or gravel-like gallstones.    Pending Diagnostic Studies:       Procedure Component Value Units Date/Time    COVID-19 Routine Screening Extended Care Placement [[id number]] Collected: 08/21/24 1011    Order Status: Sent Lab Status: In process Updated: 08/21/24 1136    Specimen: Nasopharyngeal           Final Active Diagnoses:    Diagnosis Date Noted POA    PRINCIPAL PROBLEM:  Closed right pilon fracture [S82.871A] 08/14/2024 Yes    Delirium [R41.0] 08/18/2024 No    Constipation [K59.00] 08/18/2024 No    Abdominal pain [R10.9] 08/18/2024 Yes    Hypocalcemia [E83.51] 08/17/2024 No    Hypoalbuminemia [E88.09] 08/17/2024 Yes    Tremor [R25.1] 08/15/2024 Yes    Metabolic acidosis [E87.20] 08/15/2024 Yes    Closed fracture of right iliac wing [S32.301A] 08/14/2024 Yes    Multiple fractures of pelvis [S32.82XA] 08/14/2024 Yes    Closed displaced fracture of right acetabulum [S32.401A] 08/14/2024 Yes    Transaminitis [R74.01] 08/14/2024 Yes    Closed fracture of superior and inferior rami of right pubis [S32.511A] 08/14/2024 Yes    Acute blood loss anemia [D62] 08/14/2024 Yes    Acute cystitis [N30.00] 11/03/2023 Yes    KYA (acute kidney injury) [N17.9] 04/10/2023 Yes    Chronic diastolic heart failure [I50.32] 08/10/2021 Yes     Chronic    Stage 3a chronic kidney disease [N18.31] 12/10/2019 Yes     Chronic    Hypomagnesemia [E83.42] 03/31/2019 Yes    Coronary artery disease of native artery of native heart with stable angina pectoris [I25.118] 03/29/2019 Yes    Type 2 diabetes mellitus with stage 3 chronic kidney disease, with long-term current use of insulin [E11.22, N18.30, Z79.4] 11/08/2016 Not Applicable     Chronic    Mixed hyperlipidemia [E78.2] 07/30/2015 Yes     Primary hypertension [I10] 01/24/2014 Yes    Anxiety [F41.9] 01/24/2014 Yes     Chronic      Problems Resolved During this Admission:    Diagnosis Date Noted Date Resolved POA    Closed fracture of right tibia and fibula [S82.201A, S82.401A] 08/14/2024 08/14/2024 Yes      Discharged Condition: good    Disposition: Skilled Nursing Facility    Follow Up:   Follow-up Information       [facility] 5th Fl Follow up.    Specialty: Orthopedics  Why: 2 week follow up  Contact information:  [address], [address]th [address]  [facility] [address] [phone]  Additional information:  [facility] - Main Building, 5th Floor   Please park in [address] and take [address] elevator                         Patient Instructions:      Ambulatory referral/consult to Outpatient Case Management   Referral Priority: Routine Referral Type: Consultation   Referral Reason: Specialty Services Required   Number of Visits Requested: 1     Medications:  Reconciled Home Medications:      Medication List        START taking these medications      acetaminophen 500 MG tablet  Commonly known as: TYLENOL  Take 1 tablet (500 mg total) by mouth every 8 (eight) hours.     aluminum & magnesium hydroxide-simethicone 400-400-40 mg/5 mL suspension  Commonly known as: MYLANTA MAX STRENGTH  Take 30 mLs by mouth every 6 (six) hours as needed for Indigestion.     apixaban 2.5 mg Tab  Commonly known as: ELIQUIS  Take 1 tablet (2.5 mg total) by mouth 2 (two) times daily. End date October 26th 2024     CALCIUM ANTACID 300 mg (750 mg) chewable tablet  Generic drug: calcium carbonate  Take 1 750 mg tablet daily     divalproex 125 mg capsule  Commonly known as: DEPAKOTE SPRINKLES  Take 2 capsules (250 mg total) by mouth every 8 (eight) hours. Per psych MD can stop while at SNF if doing well without any altered mental status while there but continue on discharge from hospital for now     hydrOXYzine HCL 25 MG tablet  Commonly known  as: ATARAX  Take 1 tablet (25 mg total) by mouth 3 (three) times daily as needed for Itching.     melatonin 3 mg tablet  Commonly known as: MELATIN  Take 2 tablets (6 mg total) by mouth nightly.  Replaces: melatonin 10 mg Cap     methocarbamoL 500 MG Tab  Commonly known as: ROBAXIN  Take 1 tablet (500 mg total) by mouth 4 (four) times daily.     ondansetron 8 MG Tbdl  Commonly known as: ZOFRAN-ODT  Take 1 tablet (8 mg total) by mouth every 8 (eight) hours as needed (nausea).     * oxyCODONE 5 MG immediate release tablet  Commonly known as: ROXICODONE  Take 1 tablet (5 mg total) by mouth every 6 (six) hours as needed (pain scale 4-6).     * oxyCODONE 10 mg Tab immediate release tablet  Commonly known as: ROXICODONE  Take 1 tablet (10 mg total) by mouth every 6 (six) hours as needed (pain scale 7-10).     polyethylene glycol 17 gram Pwpk  Commonly known as: GLYCOLAX  Take 17 g by mouth daily as needed for Constipation.     QUEtiapine 50 MG tablet  Commonly known as: SEROQUEL  Take 1 tablet (50 mg total) by mouth every evening.           * This list has 2 medication(s) that are the same as other medications prescribed for you. Read the directions carefully, and ask your doctor or other care provider to review them with you.                CHANGE how you take these medications      aspirin 81 MG Chew  Take 1 tablet (81 mg total) by mouth once daily. Hold to avoid bleed risk on triple therapy and then resume on oct 27 once eliquis stops on oct 26th  What changed: additional instructions     insulin aspart U-100 100 unit/mL (3 mL) Inpn pen  Commonly known as: NovoLOG  Inject 0-10 Units into the skin before meals and at bedtime as needed (Hyperglycemia).  What changed:   how much to take  when to take this  reasons to take this     insulin glargine U-100 (Lantus) 100 unit/mL (3 mL) Inpn pen  Inject 19 Units into the skin every evening.  What changed: how much to take     isosorbide mononitrate 60 MG 24 hr tablet  Commonly  "known as: IMDUR  Take 1 tablet (60 mg total) by mouth once daily.  What changed:   medication strength  how much to take     * OZEMPIC 1 mg/dose (4 mg/3 mL)  Generic drug: semaglutide  Inject 1 mg into the skin every 7 days. HOLD while at [facility]  What changed: additional instructions     * OZEMPIC 0.25 mg or 0.5 mg (2 mg/3 mL) pen injector  Generic drug: semaglutide  Inject 0.5 mg once weekly thereafter    HOLD at [facility]  What changed: additional instructions           * This list has 2 medication(s) that are the same as other medications prescribed for you. Read the directions carefully, and ask your doctor or other care provider to review them with you.                CONTINUE taking these medications      albuterol 90 mcg/actuation inhaler  Commonly known as: PROVENTIL/VENTOLIN HFA  Inhale 2 puffs into the lungs every 6 (six) hours as needed for Wheezing or Shortness of Breath.     atorvastatin 80 MG tablet  Commonly known as: LIPITOR  Take 1 tablet by mouth in the evening     DEXCOM G7  Misc  Generic drug: blood-glucose meter,continuous  Use 1  to track blood glucose, ICD10: E11.65     DEXCOM G7 SENSOR Samina  Generic drug: blood-glucose sensor  Use 1 sensor every 10 days to track blood glucose, ICD10: E11.65, okay with 90 day supply if possible     FLUoxetine 40 MG capsule  Take 1 capsule (40 mg total) by mouth once daily.     metoprolol succinate 25 MG 24 hr tablet  Commonly known as: TOPROL-XL  Take 1 tablet (25 mg total) by mouth once daily.     pantoprazole 40 MG tablet  Commonly known as: PROTONIX  Take 1 tablet (40 mg total) by mouth once daily.     pen needle, diabetic 32 gauge x 5/32" Ndle  Commonly known as: BD ULTRA-FINE SAGAR PEN NEEDLE  One pen needle use with insulin pen 4 times a day.  ICD-10: E11.9     ticagrelor 90 mg tablet  Commonly known as: BRILINTA  Take 1 tablet (90 mg total) by mouth 2 (two) times daily.            STOP taking these medications      ASCORBIC ACID (VITAMIN C) ORAL   "   CENTRUM COMPLETE ORAL     cholecalciferol (vitamin D3) 50 mcg (2,000 unit) Cap capsule  Commonly known as: VITAMIN D3     cyanocobalamin 1000 MCG tablet  Commonly known as: VITAMIN B-12     DIGESTIVE ADVANTAGE IMMUNE ORAL     EPINEPHrine 0.3 mg/0.3 mL Atin  Commonly known as: EPIPEN     ESOMEPRAZOLE MAGNESIUM ORAL     gabapentin 300 MG capsule  Commonly known as: NEURONTIN     hydrALAZINE 50 MG tablet  Commonly known as: APRESOLINE     LIDOcaine 5 %  Commonly known as: LIDODERM     LORazepam 1 MG tablet  Commonly known as: ATIVAN     magnesium oxide 400 mg (241.3 mg magnesium) tablet  Commonly known as: MAG-OX     melatonin 10 mg Cap  Replaced by: melatonin 3 mg tablet     vitamin E 1000 UNIT capsule     VOLTAREN TOP            Time spent on the discharge of patient: 45 minutes         Heavenly Yeboah, MS3  [facility] - Surgery

## 2024-08-22 ENCOUNTER — OUTPATIENT CASE MANAGEMENT (OUTPATIENT)
Dept: ADMINISTRATIVE | Facility: OTHER | Age: 74
End: 2024-08-22
Payer: MEDICARE

## 2024-08-22 NOTE — PROGRESS NOTES
Outpatient Care Management  Patient Not Qualified    Patient: Lorena Contreras  MRN:  [id number]  Date of Service:  8/22/2024  Completed by:  Mary Jo Ivan RN    Chief Complaint   Patient presents with    OPCM Chart Review    OPCM Enrollment Call    Case Closure       Patient Summary           Reason Not Qualified:  Followed by SNF

## 2024-08-29 ENCOUNTER — HOSPITAL ENCOUNTER (EMERGENCY)
Facility: HOSPITAL | Age: 74
Discharge: NURSING, REHAB OR LONG-TERM CARE | End: 2024-08-29
Attending: EMERGENCY MEDICINE
Payer: MEDICARE

## 2024-08-29 ENCOUNTER — PATIENT OUTREACH (OUTPATIENT)
Dept: ADMINISTRATIVE | Facility: OTHER | Age: 74
End: 2024-08-29
Payer: MEDICARE

## 2024-08-29 VITALS
HEIGHT: 69 IN | TEMPERATURE: 98 F | WEIGHT: 185 LBS | RESPIRATION RATE: 18 BRPM | HEART RATE: 108 BPM | SYSTOLIC BLOOD PRESSURE: 173 MMHG | OXYGEN SATURATION: 97 % | BODY MASS INDEX: 27.4 KG/M2 | DIASTOLIC BLOOD PRESSURE: 126 MMHG

## 2024-08-29 DIAGNOSIS — F41.9 ANXIETY: ICD-10-CM

## 2024-08-29 DIAGNOSIS — N39.0 URINARY TRACT INFECTION WITHOUT HEMATURIA, SITE UNSPECIFIED: Primary | ICD-10-CM

## 2024-08-29 DIAGNOSIS — R33.9 URINARY RETENTION: ICD-10-CM

## 2024-08-29 LAB
ALBUMIN SERPL BCP-MCNC: 2.3 G/DL (ref 3.5–5.2)
ALLENS TEST: NORMAL
ALP SERPL-CCNC: 295 U/L (ref 55–135)
ALT SERPL W/O P-5'-P-CCNC: 41 U/L (ref 10–44)
ANION GAP SERPL CALC-SCNC: 10 MMOL/L (ref 8–16)
AST SERPL-CCNC: 66 U/L (ref 10–40)
BACTERIA #/AREA URNS HPF: ABNORMAL /HPF
BASOPHILS # BLD AUTO: 0.08 K/UL (ref 0–0.2)
BASOPHILS NFR BLD: 0.5 % (ref 0–1.9)
BILIRUB SERPL-MCNC: 0.6 MG/DL (ref 0.1–1)
BILIRUB UR QL STRIP: NEGATIVE
BUN SERPL-MCNC: 36 MG/DL (ref 8–23)
CALCIUM SERPL-MCNC: 8.9 MG/DL (ref 8.7–10.5)
CHLORIDE SERPL-SCNC: 101 MMOL/L (ref 95–110)
CLARITY UR: CLEAR
CO2 SERPL-SCNC: 20 MMOL/L (ref 23–29)
COLOR UR: YELLOW
CREAT SERPL-MCNC: 2.3 MG/DL (ref 0.5–1.4)
DIFFERENTIAL METHOD BLD: ABNORMAL
EOSINOPHIL # BLD AUTO: 0 K/UL (ref 0–0.5)
EOSINOPHIL NFR BLD: 0.3 % (ref 0–8)
ERYTHROCYTE [DISTWIDTH] IN BLOOD BY AUTOMATED COUNT: 15.5 % (ref 11.5–14.5)
EST. GFR  (NO RACE VARIABLE): 22 ML/MIN/1.73 M^2
GLUCOSE SERPL-MCNC: 161 MG/DL (ref 70–110)
GLUCOSE UR QL STRIP: ABNORMAL
HCT VFR BLD AUTO: 36.4 % (ref 37–48.5)
HGB BLD-MCNC: 11.8 G/DL (ref 12–16)
HGB UR QL STRIP: NEGATIVE
HYALINE CASTS #/AREA URNS LPF: 0 /LPF
IMM GRANULOCYTES # BLD AUTO: 0.19 K/UL (ref 0–0.04)
IMM GRANULOCYTES NFR BLD AUTO: 1.2 % (ref 0–0.5)
KETONES UR QL STRIP: NEGATIVE
LDH SERPL L TO P-CCNC: 0.55 MMOL/L (ref 0.5–2.2)
LEUKOCYTE ESTERASE UR QL STRIP: ABNORMAL
LYMPHOCYTES # BLD AUTO: 1.4 K/UL (ref 1–4.8)
LYMPHOCYTES NFR BLD: 8.9 % (ref 18–48)
MAGNESIUM SERPL-MCNC: 2.2 MG/DL (ref 1.6–2.6)
MCH RBC QN AUTO: 29.4 PG (ref 27–31)
MCHC RBC AUTO-ENTMCNC: 32.4 G/DL (ref 32–36)
MCV RBC AUTO: 91 FL (ref 82–98)
MICROSCOPIC COMMENT: ABNORMAL
MONOCYTES # BLD AUTO: 0.9 K/UL (ref 0.3–1)
MONOCYTES NFR BLD: 5.7 % (ref 4–15)
NEUTROPHILS # BLD AUTO: 12.8 K/UL (ref 1.8–7.7)
NEUTROPHILS NFR BLD: 83.4 % (ref 38–73)
NITRITE UR QL STRIP: NEGATIVE
NRBC BLD-RTO: 0 /100 WBC
OHS QRS DURATION: 78 MS
OHS QTC CALCULATION: 482 MS
PH UR STRIP: 6 [PH] (ref 5–8)
PLATELET # BLD AUTO: 419 K/UL (ref 150–450)
PMV BLD AUTO: 11.2 FL (ref 9.2–12.9)
POCT GLUCOSE: 173 MG/DL (ref 70–110)
POTASSIUM SERPL-SCNC: 5 MMOL/L (ref 3.5–5.1)
PROT SERPL-MCNC: 7.1 G/DL (ref 6–8.4)
PROT UR QL STRIP: ABNORMAL
RBC # BLD AUTO: 4.02 M/UL (ref 4–5.4)
RBC #/AREA URNS HPF: 1 /HPF (ref 0–4)
SAMPLE: NORMAL
SITE: NORMAL
SODIUM SERPL-SCNC: 131 MMOL/L (ref 136–145)
SP GR UR STRIP: 1.01 (ref 1–1.03)
TSH SERPL DL<=0.005 MIU/L-ACNC: 1.18 UIU/ML (ref 0.4–4)
URN SPEC COLLECT METH UR: ABNORMAL
UROBILINOGEN UR STRIP-ACNC: NEGATIVE EU/DL
VALPROATE SERPL-MCNC: <12.5 UG/ML (ref 50–100)
WBC # BLD AUTO: 15.31 K/UL (ref 3.9–12.7)
WBC #/AREA URNS HPF: 44 /HPF (ref 0–5)
WBC CLUMPS URNS QL MICRO: ABNORMAL

## 2024-08-29 PROCEDURE — 93010 ELECTROCARDIOGRAM REPORT: CPT | Mod: HCNC,,, | Performed by: INTERNAL MEDICINE

## 2024-08-29 PROCEDURE — 99900035 HC TECH TIME PER 15 MIN (STAT): Mod: HCNC

## 2024-08-29 PROCEDURE — 84443 ASSAY THYROID STIM HORMONE: CPT | Mod: HCNC | Performed by: EMERGENCY MEDICINE

## 2024-08-29 PROCEDURE — 83605 ASSAY OF LACTIC ACID: CPT | Mod: HCNC

## 2024-08-29 PROCEDURE — 63600175 PHARM REV CODE 636 W HCPCS: Mod: HCNC | Performed by: EMERGENCY MEDICINE

## 2024-08-29 PROCEDURE — 80164 ASSAY DIPROPYLACETIC ACD TOT: CPT | Mod: HCNC | Performed by: EMERGENCY MEDICINE

## 2024-08-29 PROCEDURE — 96361 HYDRATE IV INFUSION ADD-ON: CPT

## 2024-08-29 PROCEDURE — 25000003 PHARM REV CODE 250: Mod: HCNC | Performed by: EMERGENCY MEDICINE

## 2024-08-29 PROCEDURE — 51702 INSERT TEMP BLADDER CATH: CPT

## 2024-08-29 PROCEDURE — 85025 COMPLETE CBC W/AUTO DIFF WBC: CPT | Mod: HCNC | Performed by: EMERGENCY MEDICINE

## 2024-08-29 PROCEDURE — 99284 EMERGENCY DEPT VISIT MOD MDM: CPT | Mod: 25

## 2024-08-29 PROCEDURE — 96365 THER/PROPH/DIAG IV INF INIT: CPT

## 2024-08-29 PROCEDURE — 83735 ASSAY OF MAGNESIUM: CPT | Mod: HCNC | Performed by: EMERGENCY MEDICINE

## 2024-08-29 PROCEDURE — 93005 ELECTROCARDIOGRAM TRACING: CPT | Mod: HCNC

## 2024-08-29 PROCEDURE — 96375 TX/PRO/DX INJ NEW DRUG ADDON: CPT | Mod: 59

## 2024-08-29 PROCEDURE — 81000 URINALYSIS NONAUTO W/SCOPE: CPT | Mod: HCNC | Performed by: EMERGENCY MEDICINE

## 2024-08-29 PROCEDURE — 82962 GLUCOSE BLOOD TEST: CPT

## 2024-08-29 PROCEDURE — 87086 URINE CULTURE/COLONY COUNT: CPT | Mod: HCNC | Performed by: EMERGENCY MEDICINE

## 2024-08-29 PROCEDURE — 80053 COMPREHEN METABOLIC PANEL: CPT | Mod: HCNC | Performed by: EMERGENCY MEDICINE

## 2024-08-29 RX ORDER — LORAZEPAM 2 MG/ML
0.5 INJECTION INTRAMUSCULAR
Status: COMPLETED | OUTPATIENT
Start: 2024-08-29 | End: 2024-08-29

## 2024-08-29 RX ORDER — OXYCODONE HYDROCHLORIDE 5 MG/1
10 TABLET ORAL
Status: COMPLETED | OUTPATIENT
Start: 2024-08-29 | End: 2024-08-29

## 2024-08-29 RX ORDER — AMOXICILLIN AND CLAVULANATE POTASSIUM 875; 125 MG/1; MG/1
1 TABLET, FILM COATED ORAL 2 TIMES DAILY
Qty: 14 TABLET | Refills: 0 | Status: SHIPPED | OUTPATIENT
Start: 2024-08-29

## 2024-08-29 RX ADMIN — SODIUM CHLORIDE 1000 ML: 9 INJECTION, SOLUTION INTRAVENOUS at 11:08

## 2024-08-29 RX ADMIN — LORAZEPAM 0.5 MG: 2 INJECTION INTRAMUSCULAR; INTRAVENOUS at 11:08

## 2024-08-29 RX ADMIN — CEFTRIAXONE 1 G: 1 INJECTION, POWDER, FOR SOLUTION INTRAMUSCULAR; INTRAVENOUS at 12:08

## 2024-08-29 RX ADMIN — OXYCODONE 10 MG: 5 TABLET ORAL at 12:08

## 2024-08-29 NOTE — PROGRESS NOTES
CHW - Initial Contact    This Community Health Worker verified the Social Determinant of Health questionnaire with patient  at bedside  today.    Pt identified barriers of most importance are: has no needs at this time.   Referrals to community agencies completed with patient/caregiver consent outside of [facility] include: no: none at this time.  Referrals were put through [facility] - no: none at this time.  Support and Services: has no support at this time.  Other information discussed the patient needs / wants help with: Verified SDOH with patient at bedside today, pt has no needs at this time. Will follow up in one week to check pt's future needs.    Follow up required: yes.  Follow-up Outreach - Due: 9/4/2024

## 2024-08-29 NOTE — ED NOTES
"Pt reports waking up this AM and experiencing generalized numbness. Pt states that she has a lot of intake and very little output, reports that she urinated about 200mls overnight last night and in the morning the nursing home did a catheter, after draining her bladder pt states that the numbness "went away". Pt reports that during physical therapy her BP dropped to 90 Sys- pt cannot recall Dys and had to stop PT to lay down. Pt recently had pelvic and RLE surgery in the last week and reports never having numbness until this morning. Pt reports having numbness in the lips, L hand, and bilateral feet (pt states she has neuropathy).  "

## 2024-08-29 NOTE — ED PROVIDER NOTES
Encounter Date: 8/29/2024    SCRIBE #1 NOTE: I, Audra Allen, am scribing for, and in the presence of,  López Soler MD. I have scribed the following portions of the note - Other sections scribed: HPI, ROS, PE.       History     Chief Complaint   Patient presents with    Numbness     Pt from [facility] with c/o generalized body numbness which presented this morning. EMS states staff at [facility] inserted a urinary catheter which pt states relieved her numbness. Pt denies cp, sob, n/v/d. Recent cassandra placed to RLE      HPI: 73-year-old female, with a PMHx of Anemia, Anxiety, Arthritis, CAD, DM 2, Fibromyalgia, GERD,HTN, HLD, MI, Restless leg syndrome, Stroke, presents to the ED from [facility] complaining of generalized body numbness symptoms onset since this morning. Patient reports bilateral lower lip numbness and diarrhea for the last two days.  Patient states she had a catheter placed two weeks ago since shattering her ankle and fracturing her pelvis s/p a fall at [facility]; states she had catheter removed on Sunday and immediately placed back in due to urine not draining. Patient also states staff at [facility] placed the catheter in this morning and about 700 CC's of urine came out. Patient further states she is in blood thinners. Patient denies constipation or other associated symptoms.  No other alleviating or exacerbating factors. This is the extent of the patient's complaints in the ED.                  The history is provided by the patient. No  was used.     Review of patient's allergies indicates:   Allergen Reactions    Novolin 70/30 (semi-synthetic) Nausea And Vomiting     Severe vomiting on Relion 70/30    Sulfa (sulfonamide antibiotics) Anaphylaxis    Talwin [pentazocine lactate] Anaphylaxis    Victoza [liraglutide] Nausea And Vomiting    Glipizide Nausea Only    Citric acid     Codeine     Influenza virus vaccines Hives    Iodine and iodide containing products Hives     Levetiracetam Itching    Neurontin [gabapentin]      Possible associated myoclonic jerk    Rituxan [rituximab] Hives    Zoloft [sertraline] Nausea And Vomiting     Past Medical History:   Diagnosis Date    Allergy     Altered mental status 06/19/2022    DYSARTHRIA, SPASTIC MOVEMENTS & DIFFICULTY SWALLOWING    Anemia     Anxiety     Arthritis     Cataract     both removed    Colon polyps     Coronary artery disease     Depression     Diabetes mellitus, type II     Disorder of kidney and ureter     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/2023    Fibromyalgia     Follicular lymphoma     GERD (gastroesophageal reflux disease)     HTN (hypertension)     Hyperlipidemia     MI (myocardial infarction) 03/2019    Personal history of colonic polyps     Restless leg syndrome     Stroke      Past Surgical History:   Procedure Laterality Date    COLONOSCOPY  11/07/2012    Colon polyp found; repeat in 5 years    ELBOW SURGERY Right 2015    dislocation repair     ESOPHAGOGASTRODUODENOSCOPY  11/07/2012    atrophic gastritis, H pylori testing negative    INCISION AND DRAINAGE FOOT Right 6/2/2021    Procedure: INCISION AND DRAINAGE, FOOT, bone biopsy;  Surgeon: Quiana Penn DPM;  Location: [facility] OR;  Service: Podiatry;  Laterality: Right;    KNEE SURGERY Bilateral 2015    scoped    LEFT HEART CATHETERIZATION Left 3/29/2019    Procedure: Left heart cath;  Surgeon: Bladimir Barbosa MD;  Location: [facility] CATH LAB;  Service: Cardiology;  Laterality: Left;    LEFT HEART CATHETERIZATION Left 11/18/2019    Procedure: Left heart cath;  Surgeon: Karl Rico MD;  Location: [facility] CATH LAB;  Service: Cardiology;  Laterality: Left;    LEFT HEART CATHETERIZATION Left 1/8/2020    Procedure: Left heart cath, right radial, noon start;  Surgeon: Christos Monreal MD;  Location: [facility] CATH LAB;  Service: Cardiology;  Laterality: Left;  RN Pre Op 1-6-20.  To be admitted 1-7-20 sor Aspirin Disensitation    OPEN REDUCTION AND INTERNAL FIXATION (ORIF) OF  FRACTURE OF ACETABULUM Right 8/16/2024    Procedure: ORIF, FRACTURE, ACETABULUM;  Surgeon: Neto Davalos MD;  Location: [facility] OR [address] [place] [address];  Service: Orthopedics;  Laterality: Right;  anterior and lateral pelvic incisions    OPEN REDUCTION AND INTERNAL FIXATION (ORIF) OF PILON FRACTURE Right 8/14/2024    Procedure: ORIF, FRACTURE, PILON;  Surgeon: Neto Davalos MD;  Location: [facility] OR [address] [place] [address];  Service: Orthopedics;  Laterality: Right;    TONSILLECTOMY  1955    ULTRASOUND GUIDANCE  1/8/2020    Procedure: Ultrasound Guidance;  Surgeon: Christos Monreal MD;  Location: [facility] CATH LAB;  Service: Cardiology;;     Family History   Problem Relation Name Age of Onset    Cancer Mother          colon    Heart disease Mother      Cancer Father          lung    Lung cancer Brother      Diabetes Sister      Hypertension Sister      Allergy (severe) Daughter erin     No Known Problems Daughter      Stroke Neg Hx      Hyperlipidemia Neg Hx       Social History     Tobacco Use    Smoking status: Never    Smokeless tobacco: Never   Substance Use Topics    Alcohol use: Not Currently    Drug use: Never     Review of Systems   Constitutional:  Negative for fever.   HENT:  Negative for sore throat and trouble swallowing.    Eyes:  Negative for visual disturbance.   Respiratory:  Negative for cough and shortness of breath.    Cardiovascular:  Negative for chest pain.   Gastrointestinal:  Positive for diarrhea. Negative for abdominal pain and constipation.   Genitourinary:  Negative for difficulty urinating.   Musculoskeletal:  Negative for back pain and joint swelling.   Skin:  Negative for color change and rash.   Neurological:  Positive for numbness (generalized). Negative for dizziness and headaches.        (+) bilateral lower lip numbness        Physical Exam     Initial Vitals [08/29/24 0959]   BP Pulse Resp Temp SpO2   (!) 153/60 102 16 97.9 °F (36.6 °C) 99 %      MAP       --         Physical Exam    Nursing  note and vitals reviewed.  Constitutional: She appears well-developed and well-nourished.   Cast placed on lower left leg.    HENT:   Head: Normocephalic and atraumatic.   Tongue and lips are dry.    Eyes: EOM are normal. Pupils are equal, round, and reactive to light.   Neck: Neck supple. No thyromegaly present. No JVD present.   Normal range of motion.  Cardiovascular:  Normal rate and regular rhythm.     Exam reveals no gallop and no friction rub.       No murmur heard.  Pulmonary/Chest: Breath sounds normal. No respiratory distress.   Abdominal: Abdomen is soft. Bowel sounds are normal. There is no abdominal tenderness.   Musculoskeletal:         General: No tenderness or edema. Normal range of motion.      Cervical back: Normal range of motion and neck supple.     Neurological: She is alert and oriented to person, place, and time. She has normal strength. She displays tremor (generalized). GCS score is 15. GCS eye subscore is 4. GCS verbal subscore is 5. GCS motor subscore is 6.   Skin: Skin is warm and dry. Capillary refill takes less than 2 seconds.   Psychiatric: Her mood appears anxious.         ED Course   Procedures  Labs Reviewed   CBC W/ AUTO DIFFERENTIAL - Abnormal       Result Value    WBC 15.31 (*)     RBC 4.02      Hemoglobin 11.8 (*)     Hematocrit 36.4 (*)     MCV 91      MCH 29.4      MCHC 32.4      RDW 15.5 (*)     Platelets 419      MPV 11.2      Immature Granulocytes 1.2 (*)     Gran # (ANC) 12.8 (*)     Immature Grans (Abs) 0.19 (*)     Lymph # 1.4      Mono # 0.9      Eos # 0.0      Baso # 0.08      nRBC 0      Gran % 83.4 (*)     Lymph % 8.9 (*)     Mono % 5.7      Eosinophil % 0.3      Basophil % 0.5      Differential Method Automated     COMPREHENSIVE METABOLIC PANEL - Abnormal    Sodium 131 (*)     Potassium 5.0      Chloride 101      CO2 20 (*)     Glucose 161 (*)     BUN 36 (*)     Creatinine 2.3 (*)     Calcium 8.9      Total Protein 7.1      Albumin 2.3 (*)     Total Bilirubin 0.6       Alkaline Phosphatase 295 (*)     AST 66 (*)     ALT 41      eGFR 22 (*)     Anion Gap 10     URINALYSIS, REFLEX TO URINE CULTURE - Abnormal    Specimen UA Urine, Catheterized      Color, UA Yellow      Appearance, UA Clear      pH, UA 6.0      Specific Gravity, UA 1.010      Protein, UA 2+ (*)     Glucose, UA Trace (*)     Ketones, UA Negative      Bilirubin (UA) Negative      Occult Blood UA Negative      Nitrite, UA Negative      Urobilinogen, UA Negative      Leukocytes, UA 3+ (*)     Narrative:     Specimen Source->Urine   VALPROIC ACID - Abnormal    Valproic Acid Level <12.5 (*)    URINALYSIS MICROSCOPIC - Abnormal    RBC, UA 1      WBC, UA 44 (*)     WBC Clumps, UA Rare      Bacteria None      Hyaline Casts, UA 0      Microscopic Comment SEE COMMENT      Narrative:     Specimen Source->Urine   POCT GLUCOSE - Abnormal    POCT Glucose 173 (*)    CULTURE, URINE    Urine Culture, Routine No significant growth      Narrative:     Specimen Source->Urine   MAGNESIUM    Magnesium 2.2     TSH    TSH 1.181     ISTAT LACTATE    POC Lactate 0.55      Sample VENOUS      Site Other      Allens Test N/A       EKG Readings: (Independently Interpreted)   Normal sinus rhythm rate of 97, LVH, old inferior infarct, age-indeterminate anterolateral infarct     ECG Results              EKG 12-lead (Final result)        Collection Time Result Time QRS Duration OHS QTC Calculation    08/29/24 10:57:24 08/29/24 15:14:40 78 482                     Final result by Interface, Lab In [facility] (08/29/24 15:14:46)                   Narrative:    Test Reason : F41.9,    Vent. Rate : 097 BPM     Atrial Rate : 097 BPM     P-R Int : 174 ms          QRS Dur : 078 ms      QT Int : 380 ms       P-R-T Axes : 049 002 -09 degrees     QTc Int : 482 ms    Normal sinus rhythm  Minimal voltage criteria for LVH, may be normal variant  Inferior infarct (cited on or before 29-AUG-2024)  Anterolateral infarct ,age undetermined  Abnormal ECG  When compared  with ECG of 18-AUG-2024 15:58,  Significant changes have occurred  Confirmed by Karl Rico MD ([id number]) on 8/29/2024 3:14:34 PM    Referred By: MARY   SELF           Confirmed By:Karl Rico MD                                  Imaging Results    None          Medications   sodium chloride 0.9% bolus 1,000 mL 1,000 mL (0 mLs Intravenous Stopped 8/29/24 1208)   LORazepam injection 0.5 mg (0.5 mg Intravenous Given 8/29/24 1115)   oxyCODONE immediate release tablet 10 mg (10 mg Oral Given 8/29/24 1241)   cefTRIAXone (Rocephin) 1 g in D5W 100 mL IVPB (MB+) (0 g Intravenous Stopped 8/29/24 1311)     Medical Decision Making  This is an emergent evaluation of a 73 y.o. female who presents with numbness. The patient was seen and examined. The history and physical exam was obtained. The nursing notes and vital signs were reviewed. Secondary to symptoms and examination findings, I ordered Labs and EKG.       Amount and/or Complexity of Data Reviewed  Labs: ordered. Decision-making details documented in ED Course.  ECG/medicine tests: ordered. Decision-making details documented in ED Course.    Risk  Prescription drug management.            Scribe Attestation:   Scribe #1: I performed the above scribed service and the documentation accurately describes the services I performed. I attest to the accuracy of the note.              Patient presents with generalized body numbness.  This was completely resolved once a Benoit catheter was placed and her urinary retention resolved.  Patient was found to have UTI.  I think there was an anxiety component as well.  Workup suggests UTI.  Workup suggests minimal elevation and renal function.  Patient and patient's daughter feel comfortable going home.  Expect resolution of renal function change now that urinary retention is resolved and UTI is being treated.  Return for any new or worsening or return of symptoms.  Follow up this week with your doctor for lab recheck and blood pressure  recheck.                 Clinical Impression:  Final diagnoses:  [F41.9] Anxiety  [N39.0] Urinary tract infection without hematuria, site unspecified (Primary)  [R33.9] Urinary retention          ED Disposition Condition    Discharge Stable          ED Prescriptions       Medication Sig Dispense Start Date End Date Auth. Provider    amoxicillin-clavulanate 875-125mg (AUGMENTIN) 875-125 mg per tablet Take 1 tablet by mouth 2 (two) times daily. 14 tablet 8/29/2024 -- Blanca Soler MD          Follow-up Information       Follow up With Specialties Details Why Contact Info    Jorge Paris MD Internal Medicine, Pediatrics  renal function recheck this week. [address] [facility] [id number] [phone]      [facility] Emergency Dept Emergency Medicine   [address] [facility] [place] [address] [phone]        I, Blanca Soler, personally performed the services described in this documentation. All medical record entries made by the scribe were at my direction and in my presence. I have reviewed the chart and agree that the record reflects my personal performance and is accurate and complete.      DISCLAIMER: This note was prepared with [employer] voice recognition transcription software. Garbled syntax, mangled pronouns, and other bizarre constructions may be attributed to that software system.       Blanca Soler MD  08/31/24 [id number]

## 2024-08-31 LAB — BACTERIA UR CULT: NORMAL

## 2024-09-03 ENCOUNTER — HOSPITAL ENCOUNTER (OUTPATIENT)
Dept: RADIOLOGY | Facility: HOSPITAL | Age: 74
Discharge: HOME | End: 2024-09-03
Attending: NURSE PRACTITIONER
Payer: MEDICARE

## 2024-09-03 ENCOUNTER — OFFICE VISIT (OUTPATIENT)
Dept: ORTHOPEDICS | Facility: CLINIC | Age: 74
End: 2024-09-03
Payer: MEDICARE

## 2024-09-03 DIAGNOSIS — R52 PAIN: ICD-10-CM

## 2024-09-03 DIAGNOSIS — S32.401D CLOSED DISPLACED FRACTURE OF RIGHT ACETABULUM WITH ROUTINE HEALING, UNSPECIFIED PORTION OF ACETABULUM, SUBSEQUENT ENCOUNTER: ICD-10-CM

## 2024-09-03 DIAGNOSIS — Z98.890 POST-OPERATIVE STATE: ICD-10-CM

## 2024-09-03 DIAGNOSIS — R52 PAIN: Primary | ICD-10-CM

## 2024-09-03 DIAGNOSIS — S82.874D CLOSED NONDISPLACED PILON FRACTURE OF RIGHT TIBIA WITH ROUTINE HEALING, SUBSEQUENT ENCOUNTER: Primary | ICD-10-CM

## 2024-09-03 DIAGNOSIS — S32.511D CLOSED FRACTURE OF SUPERIOR RAMUS OF RIGHT PUBIS WITH ROUTINE HEALING, SUBSEQUENT ENCOUNTER: ICD-10-CM

## 2024-09-03 PROCEDURE — 73610 X-RAY EXAM OF ANKLE: CPT | Mod: 26,RT,, | Performed by: RADIOLOGY

## 2024-09-03 PROCEDURE — 73610 X-RAY EXAM OF ANKLE: CPT | Mod: TC,RT

## 2024-09-03 PROCEDURE — 72170 X-RAY EXAM OF PELVIS: CPT | Mod: TC

## 2024-09-03 PROCEDURE — 72170 X-RAY EXAM OF PELVIS: CPT | Mod: 26,,, | Performed by: RADIOLOGY

## 2024-09-03 PROCEDURE — 99999 PR PBB SHADOW E&M-EST. PATIENT-LVL IV: CPT | Mod: PBBFAC,,, | Performed by: NURSE PRACTITIONER

## 2024-09-03 RX ORDER — DOXYCYCLINE 100 MG/1
100 CAPSULE ORAL EVERY 12 HOURS
Qty: 20 CAPSULE | Refills: 0 | Status: SHIPPED | OUTPATIENT
Start: 2024-09-03 | End: 2024-09-13

## 2024-09-03 NOTE — PROGRESS NOTES
Ms. Contreras is here today for a post-operative visit after undergoing the following:    Open reduction internal fixation right both column acetabulum fracture  done 8/16/24  Right ankle pilon fracture done 8/14/24    by Dr. Davalos.    Interval History:  She reports that she is doing ok.  She states their pain is controlled with medication.  She is taking pain medication.  She is currently in a NH.  Reports the staff has been dealing with wounds in her pubic area.  States thinks her incision opened.  Staff has been applying Medihoney.  She also reports she went to the emergency room and was diagnosed with a UTI.  She is currently on antibiotics.  She denies any falls or injuries since surgery/last seen in the clinic.  She denies fever, chills, and sweats since the time of the surgery.     Physical exam:  The patient's dressing was taken down.  Her pubic incision line appears to be healing.  There is a 1 x 1 cm area over mid incision line and towards the right with sloughing.  No surrounding erythema or drainage noted.  Her right lower leg incision is closed with sutures.  Sutures are not ready to come out and therefore left in place.  There is mild erythema to medial incision line, no drainage seen. Her incisions were cleaned with Betadine and the patient will be placed back in a splint by the cast tech.  She has tactile stimulation to their lower leg, she denies calf pain, there is no leg edema and their pedal pulse is palpable x 2.     RADS:   Right ankle x-ray shows the following:  There is hardware stabilizing distal tib fib fractures. There is good alignment, no complication, and no hardware failure. Ankle mortise is maintained. There is DJD and spurs on the calcaneus.     Pelvis with Judet view x-ray shows the following:  There is hardware status post fixation of multiple right pelvic fractures. There is good alignment and no complication seen. No hardware failure is seen. There is no worrisome change      Assessment:  Post-op visit (2 weeks)    Plan:    ICD-10-CM ICD-9-CM   1. Closed nondisplaced pilon fracture of right tibia with routine healing, subsequent encounter s/p ORIF 8/17/24  S82.874D V54.16   2. Post-operative state  Z98.890 V45.89   3. Closed displaced fracture of right acetabulum with routine healing, unspecified portion of acetabulum, subsequent encounter  S32.401D V54.19   4. Closed fracture of superior ramus of right pubis with routine healing, subsequent encounter  S32.511D V54.19   5. Pain  R52 780.96     Current care, treatment plan, precautions, activity level/ modifications, limitations, rehabilitation exercises and proposed future treatment were discussed with the patient. We discussed the need to monitor for changes in symptoms and condition and report them to the physician.  Discussed importance of compliance with all appointments and follow up examinations.     73-year-old female, diabetes, neuropathy, prior heart attack and stroke with fibromyalgia, restless leg syndrome and multiple medication allergies  Ground level fall 08/13/2024  Right ankle pilon fracture  Right both column acetabular fracture     08/14/2024 - ORIF right ankle pilon fracture     08/16/2024 - ORIF right both column acetabular fracture    WOUND CARE ORDERS  - Do not remove surgical dressing for 2 weeks post-op. This will be done only by MD/BENIGNO at initial post-op visit. If dressing is completely saturated, Call number below.   - Do not get dressings wet.   - Do not shower.   - If dressing continues to be saturated or there are signs of infection, please call Ortho Clinic [phone] for further instructions and to make appt to be seen.   - NH staff to continue to use Medihoney to her pubic area.  - I will treat her with PPX Doxycycline 100 mg bid x 10 days.      PHYSICAL THERAPY:   - Physical therapy as ordered.  Patient is currently admitted to a NH.  - Weight bearing NWB to RLE x 10 weeks  - Range of motion as  tolerated.      PAIN MEDICATION:   - Pain medication: refill was not needed  - Pain medication refill policy provided to patient for review, yes.    - Patient was informed a multi-modal approach is used to treat their pain.     DVT PROPHYLAXIS:   - Eliquis 2.5 mg bid x 10 weeks    FRAGILITY:  - Patient has been referred to the fracture clinic.     FOLLOW UP:   - Patient will follow up in the clinic in 1 weeks.  - X-ray of her right ankle and pelvis is not needed.  - At time consider removal of sutures and placement in cast versus boot  - Future Appointments:   Future Appointments   Date Time Provider Department Center   9/11/2024  2:45 PM Raheem Bucio NP [facility] ORTHO [facility] [facility]   9/25/2024  2:30 PM Raheem Bucio NP [facility] ORTHO [address] [place]   10/24/2024  9:40 AM Jorge Paris MD [facility]   11/6/2024 11:00 AM LAB, [facility] [place]   11/13/2024  3:00 PM Gerardo Barbour MD [facility]           If there are any questions prior to scheduled follow up, the patient was instructed to contact the office

## 2024-09-03 NOTE — PROGRESS NOTES
RT fiberglass SLC removal and application ordered by MAYKEL Bucio. Skin intact with no redness or bruising.

## 2024-09-04 ENCOUNTER — TELEPHONE (OUTPATIENT)
Dept: ORTHOPEDICS | Facility: CLINIC | Age: 74
End: 2024-09-04
Payer: MEDICARE

## 2024-09-04 NOTE — TELEPHONE ENCOUNTER
----- Message from Alexandria Grullon sent at 9/4/2024  1:51 PM CDT -----  Regarding: Mr Marcum with [facility] called to speak to the nurse due to the pt's cast being to tight and pt states that she is in pain and he would like a call back today asap  Type:  Needs Medical Advice    Who Called: Mr Jossue Grimm  Symptoms (please be specific): Mr Jossue Grimm called to speak to the nurse due to the pt's cast being to tight that was put on on 9/3/24 and pt states that she is in pain and he would like a call back today asap  How long has patient had these symptoms:  9/4/24  Would the patient rather a call back or a response via [facility]?  Call Back today asap  Best Call Back Number: [phone] ext [phone]  Additional Information: Mr Marcum states that if he is not available to ask for Crystal

## 2024-09-06 ENCOUNTER — TELEPHONE (OUTPATIENT)
Dept: ORTHOPEDICS | Facility: CLINIC | Age: 74
End: 2024-09-06
Payer: MEDICARE

## 2024-09-06 NOTE — TELEPHONE ENCOUNTER
Called and rescheduled pt's post-op as requested to 09/13 @c 12:30 pm. Pt's daughter was satisfied with new appt date/time.  
Alert and interactive, no focal deficits

## 2024-09-13 ENCOUNTER — OFFICE VISIT (OUTPATIENT)
Dept: ORTHOPEDICS | Facility: CLINIC | Age: 74
End: 2024-09-13
Payer: MEDICARE

## 2024-09-13 VITALS — BODY MASS INDEX: 27.39 KG/M2 | HEIGHT: 69 IN | WEIGHT: 184.94 LBS

## 2024-09-13 DIAGNOSIS — Z48.02 VISIT FOR SUTURE REMOVAL: Primary | ICD-10-CM

## 2024-09-13 DIAGNOSIS — S82.874D CLOSED NONDISPLACED PILON FRACTURE OF RIGHT TIBIA WITH ROUTINE HEALING, SUBSEQUENT ENCOUNTER: ICD-10-CM

## 2024-09-13 DIAGNOSIS — Z98.890 POST-OPERATIVE STATE: ICD-10-CM

## 2024-09-13 PROCEDURE — 99999 PR PBB SHADOW E&M-EST. PATIENT-LVL IV: CPT | Mod: PBBFAC,,, | Performed by: NURSE PRACTITIONER

## 2024-09-13 NOTE — PROGRESS NOTES
Ms. Contreras is here today for a post-operative visit after undergoing the following:    Open reduction internal fixation right both column acetabulum fracture  done 8/16/24  Right ankle pilon fracture done 8/14/24    by Dr. Davalos.    Interval History:  She reports that she is doing ok.  She states their pain is controlled with medication.  She is taking pain medication.  She is currently in a NH.  Reports the staff has been dealing with wounds in her pubic area which now appear to be healing.  She denies any falls or injuries since surgery/last seen in the clinic.  She denies fever, chills, and sweats since the time of the surgery.     Physical exam:  The patient's right ankle incision is open air.  Sutures are present.  Sutures were removed and Steri-Strips were applied, patient tolerated well.  I will place the patient in a Podus boot at this time as she is nonweightbearing.  She has tactile stimulation to their lower leg, she denies calf pain, there is no leg edema and their pedal pulse is palpable x 2.     RADS:   None     Assessment:  Post-op visit (3 weeks)    Plan:    ICD-10-CM ICD-9-CM   1. Visit for suture removal  Z48.02 V58.32   2. Closed nondisplaced pilon fracture of right tibia with routine healing, subsequent encounter s/p ORIF 8/17/24  S82.874D V54.16   3. Post-operative state  Z98.890 V45.89       Current care, treatment plan, precautions, activity level/ modifications, limitations, rehabilitation exercises and proposed future treatment were discussed with the patient. We discussed the need to monitor for changes in symptoms and condition and report them to the physician.  Discussed importance of compliance with all appointments and follow up examinations.     73-year-old female, diabetes, neuropathy, prior heart attack and stroke with fibromyalgia, restless leg syndrome and multiple medication allergies  Ground level fall 08/13/2024  Right ankle pilon fracture  Right both column acetabular  fracture     08/14/2024 - ORIF right ankle pilon fracture     08/16/2024 - ORIF right both column acetabular fracture    WOUND CARE ORDERS  -Lorena was advised to keep the incision clean and dry for the next 24 hours after which she may wash the area with antibacterial soap in the shower.   -She not submerge until the incision is completely healed  -Patient was advised to monitor wound closely and multiple times daily for any problems. Call clinic immediately or report to ED for immediate medical attention for any complications including reopening of wound, drainage, purulence, redness, streaking, odor, pain out of proportion, fever, chills, etc.   - If there are signs of infection, please call Ortho Clinic [phone] for further instructions and to make appt to be seen.       PHYSICAL THERAPY:   - Physical therapy as ordered.  Patient is currently admitted to a NH.  - Weight bearing NWB to RLE x 10 weeks  - Range of motion as tolerated.      PAIN MEDICATION:   - Pain medication: refill was not needed  - Pain medication refill policy provided to patient for review, yes.    - Patient was informed a multi-modal approach is used to treat their pain.     DVT PROPHYLAXIS:   - Eliquis 2.5 mg bid x 10 weeks    FRAGILITY:  - Patient has been referred to the fracture clinic.     FOLLOW UP:   - Patient will follow up in the clinic in 3 weeks.  - X-ray of her right ankle and pelvis is needed.    - Future Appointments:   Future Appointments   Date Time Provider Department Center   9/25/2024  1:45 PM [facility] FRACTURE CARE CLINIC [facility] FRA CAR [facility]   10/4/2024  2:30 PM Raheem Bucio NP [facility] ORTHO [facility]   10/24/2024  9:40 AM Jorge Paris MD [facility] [place]   11/6/2024 11:00 AM LAB, [facility] [place]   11/13/2024  3:00 PM Gerardo Barbour MD [facility]       If there are any questions prior to scheduled follow up, the patient was instructed to contact the office

## 2024-09-18 DIAGNOSIS — Z78.0 MENOPAUSE: ICD-10-CM

## 2024-09-24 ENCOUNTER — ANESTHESIA EVENT (OUTPATIENT)
Dept: SURGERY | Facility: HOSPITAL | Age: 74
End: 2024-09-24
Payer: MEDICARE

## 2024-09-24 ENCOUNTER — HOSPITAL ENCOUNTER (INPATIENT)
Facility: HOSPITAL | Age: 74
LOS: 8 days | Discharge: HOME HEALTH | DRG: 907 | End: 2024-10-03
Attending: EMERGENCY MEDICINE | Admitting: INTERNAL MEDICINE
Payer: MEDICARE

## 2024-09-24 DIAGNOSIS — K21.9 GASTROESOPHAGEAL REFLUX DISEASE, UNSPECIFIED WHETHER ESOPHAGITIS PRESENT: ICD-10-CM

## 2024-09-24 DIAGNOSIS — T81.30XA WOUND DEHISCENCE: Primary | ICD-10-CM

## 2024-09-24 DIAGNOSIS — J20.9 ACUTE BRONCHITIS, UNSPECIFIED ORGANISM: ICD-10-CM

## 2024-09-24 DIAGNOSIS — Z01.818 PREOP TESTING: ICD-10-CM

## 2024-09-24 DIAGNOSIS — R07.9 CHEST PAIN: ICD-10-CM

## 2024-09-24 DIAGNOSIS — T81.31XA POSTOPERATIVE DEHISCENCE OF SKIN WOUND, INITIAL ENCOUNTER: ICD-10-CM

## 2024-09-24 DIAGNOSIS — A04.72 C. DIFFICILE COLITIS: ICD-10-CM

## 2024-09-24 LAB
ALBUMIN SERPL BCP-MCNC: 2.2 G/DL (ref 3.5–5.2)
ALLENS TEST: NORMAL
ALP SERPL-CCNC: 476 U/L (ref 55–135)
ALT SERPL W/O P-5'-P-CCNC: 145 U/L (ref 10–44)
ANION GAP SERPL CALC-SCNC: 8 MMOL/L (ref 8–16)
AST SERPL-CCNC: 122 U/L (ref 10–40)
BASOPHILS # BLD AUTO: 0.06 K/UL (ref 0–0.2)
BASOPHILS NFR BLD: 0.6 % (ref 0–1.9)
BILIRUB SERPL-MCNC: 0.4 MG/DL (ref 0.1–1)
BUN SERPL-MCNC: 36 MG/DL (ref 8–23)
CALCIUM SERPL-MCNC: 8.4 MG/DL (ref 8.7–10.5)
CHLORIDE SERPL-SCNC: 102 MMOL/L (ref 95–110)
CO2 SERPL-SCNC: 22 MMOL/L (ref 23–29)
CREAT SERPL-MCNC: 2 MG/DL (ref 0.5–1.4)
CRP SERPL-MCNC: 9.3 MG/L (ref 0–8.2)
DIFFERENTIAL METHOD BLD: ABNORMAL
EOSINOPHIL # BLD AUTO: 0 K/UL (ref 0–0.5)
EOSINOPHIL NFR BLD: 0.4 % (ref 0–8)
ERYTHROCYTE [DISTWIDTH] IN BLOOD BY AUTOMATED COUNT: 15.4 % (ref 11.5–14.5)
ERYTHROCYTE [SEDIMENTATION RATE] IN BLOOD BY PHOTOMETRIC METHOD: 49 MM/HR (ref 0–36)
EST. GFR  (NO RACE VARIABLE): 25.9 ML/MIN/1.73 M^2
GLUCOSE SERPL-MCNC: 232 MG/DL (ref 70–110)
HCT VFR BLD AUTO: 25.9 % (ref 37–48.5)
HGB BLD-MCNC: 7.9 G/DL (ref 12–16)
IMM GRANULOCYTES # BLD AUTO: 0.04 K/UL (ref 0–0.04)
IMM GRANULOCYTES NFR BLD AUTO: 0.4 % (ref 0–0.5)
LDH SERPL L TO P-CCNC: 0.91 MMOL/L (ref 0.5–2.2)
LYMPHOCYTES # BLD AUTO: 0.8 K/UL (ref 1–4.8)
LYMPHOCYTES NFR BLD: 8.6 % (ref 18–48)
MCH RBC QN AUTO: 29.6 PG (ref 27–31)
MCHC RBC AUTO-ENTMCNC: 30.5 G/DL (ref 32–36)
MCV RBC AUTO: 97 FL (ref 82–98)
MONOCYTES # BLD AUTO: 0.5 K/UL (ref 0.3–1)
MONOCYTES NFR BLD: 5 % (ref 4–15)
NEUTROPHILS # BLD AUTO: 8 K/UL (ref 1.8–7.7)
NEUTROPHILS NFR BLD: 85 % (ref 38–73)
NRBC BLD-RTO: 0 /100 WBC
PLATELET # BLD AUTO: 213 K/UL (ref 150–450)
PMV BLD AUTO: 12.5 FL (ref 9.2–12.9)
POTASSIUM SERPL-SCNC: 4.5 MMOL/L (ref 3.5–5.1)
PROT SERPL-MCNC: 6 G/DL (ref 6–8.4)
RBC # BLD AUTO: 2.67 M/UL (ref 4–5.4)
SAMPLE: NORMAL
SITE: NORMAL
SODIUM SERPL-SCNC: 132 MMOL/L (ref 136–145)
WBC # BLD AUTO: 9.42 K/UL (ref 3.9–12.7)

## 2024-09-24 PROCEDURE — 83605 ASSAY OF LACTIC ACID: CPT | Mod: HCNC

## 2024-09-24 PROCEDURE — 85025 COMPLETE CBC W/AUTO DIFF WBC: CPT | Mod: HCNC | Performed by: EMERGENCY MEDICINE

## 2024-09-24 PROCEDURE — G0378 HOSPITAL OBSERVATION PER HR: HCPCS | Mod: HCNC

## 2024-09-24 PROCEDURE — 80053 COMPREHEN METABOLIC PANEL: CPT | Mod: HCNC | Performed by: EMERGENCY MEDICINE

## 2024-09-24 PROCEDURE — 25000003 PHARM REV CODE 250: Mod: HCNC

## 2024-09-24 PROCEDURE — 96374 THER/PROPH/DIAG INJ IV PUSH: CPT | Mod: HCNC

## 2024-09-24 PROCEDURE — 93010 ELECTROCARDIOGRAM REPORT: CPT | Mod: HCNC,,, | Performed by: INTERNAL MEDICINE

## 2024-09-24 PROCEDURE — 99900035 HC TECH TIME PER 15 MIN (STAT): Mod: HCNC

## 2024-09-24 PROCEDURE — 99285 EMERGENCY DEPT VISIT HI MDM: CPT | Mod: 25,HCNC

## 2024-09-24 PROCEDURE — 63600175 PHARM REV CODE 636 W HCPCS: Mod: HCNC | Performed by: EMERGENCY MEDICINE

## 2024-09-24 PROCEDURE — 96372 THER/PROPH/DIAG INJ SC/IM: CPT

## 2024-09-24 PROCEDURE — 86140 C-REACTIVE PROTEIN: CPT | Mod: HCNC | Performed by: EMERGENCY MEDICINE

## 2024-09-24 PROCEDURE — 85652 RBC SED RATE AUTOMATED: CPT | Mod: HCNC

## 2024-09-24 PROCEDURE — 93005 ELECTROCARDIOGRAM TRACING: CPT | Mod: HCNC

## 2024-09-24 PROCEDURE — 63600175 PHARM REV CODE 636 W HCPCS: Mod: HCNC

## 2024-09-24 RX ORDER — SIMETHICONE 80 MG
1 TABLET,CHEWABLE ORAL 4 TIMES DAILY PRN
Status: DISCONTINUED | OUTPATIENT
Start: 2024-09-24 | End: 2024-10-03 | Stop reason: HOSPADM

## 2024-09-24 RX ORDER — ISOSORBIDE MONONITRATE 30 MG/1
60 TABLET, EXTENDED RELEASE ORAL DAILY
Status: DISCONTINUED | OUTPATIENT
Start: 2024-09-25 | End: 2024-10-03 | Stop reason: HOSPADM

## 2024-09-24 RX ORDER — TALC
6 POWDER (GRAM) TOPICAL NIGHTLY PRN
Status: DISCONTINUED | OUTPATIENT
Start: 2024-09-24 | End: 2024-10-03 | Stop reason: HOSPADM

## 2024-09-24 RX ORDER — ONDANSETRON 4 MG/1
4 TABLET, FILM COATED ORAL EVERY 6 HOURS PRN
Status: DISCONTINUED | OUTPATIENT
Start: 2024-09-24 | End: 2024-10-03 | Stop reason: HOSPADM

## 2024-09-24 RX ORDER — ONDANSETRON HYDROCHLORIDE 2 MG/ML
4 INJECTION, SOLUTION INTRAVENOUS
Status: COMPLETED | OUTPATIENT
Start: 2024-09-24 | End: 2024-09-24

## 2024-09-24 RX ORDER — SUCRALFATE 1 G/10ML
1 SUSPENSION ORAL EVERY 6 HOURS
Status: DISCONTINUED | OUTPATIENT
Start: 2024-09-25 | End: 2024-10-03 | Stop reason: HOSPADM

## 2024-09-24 RX ORDER — IBUPROFEN 200 MG
16 TABLET ORAL
Status: DISCONTINUED | OUTPATIENT
Start: 2024-09-24 | End: 2024-10-03 | Stop reason: HOSPADM

## 2024-09-24 RX ORDER — ACETAMINOPHEN 325 MG/1
650 TABLET ORAL
Status: DISCONTINUED | OUTPATIENT
Start: 2024-09-24 | End: 2024-09-25

## 2024-09-24 RX ORDER — INSULIN ASPART 100 [IU]/ML
0-5 INJECTION, SOLUTION INTRAVENOUS; SUBCUTANEOUS
Status: DISCONTINUED | OUTPATIENT
Start: 2024-09-25 | End: 2024-10-03 | Stop reason: HOSPADM

## 2024-09-24 RX ORDER — POLYETHYLENE GLYCOL 3350 17 G/17G
17 POWDER, FOR SOLUTION ORAL 2 TIMES DAILY PRN
Status: DISCONTINUED | OUTPATIENT
Start: 2024-09-24 | End: 2024-10-03 | Stop reason: HOSPADM

## 2024-09-24 RX ORDER — QUETIAPINE FUMARATE 25 MG/1
50 TABLET, FILM COATED ORAL NIGHTLY
Status: DISCONTINUED | OUTPATIENT
Start: 2024-09-24 | End: 2024-10-03 | Stop reason: HOSPADM

## 2024-09-24 RX ORDER — HYDROXYZINE HYDROCHLORIDE 25 MG/1
25 TABLET, FILM COATED ORAL 3 TIMES DAILY PRN
Status: DISCONTINUED | OUTPATIENT
Start: 2024-09-24 | End: 2024-10-03 | Stop reason: HOSPADM

## 2024-09-24 RX ORDER — FLUOXETINE HYDROCHLORIDE 20 MG/1
40 CAPSULE ORAL DAILY
Status: DISCONTINUED | OUTPATIENT
Start: 2024-09-25 | End: 2024-10-03 | Stop reason: HOSPADM

## 2024-09-24 RX ORDER — PROCHLORPERAZINE EDISYLATE 5 MG/ML
5 INJECTION INTRAMUSCULAR; INTRAVENOUS EVERY 6 HOURS PRN
Status: DISCONTINUED | OUTPATIENT
Start: 2024-09-24 | End: 2024-10-03 | Stop reason: HOSPADM

## 2024-09-24 RX ORDER — GLUCAGON 1 MG
1 KIT INJECTION
Status: DISCONTINUED | OUTPATIENT
Start: 2024-09-24 | End: 2024-10-03 | Stop reason: HOSPADM

## 2024-09-24 RX ORDER — ALUMINUM HYDROXIDE, MAGNESIUM HYDROXIDE, AND SIMETHICONE 1200; 120; 1200 MG/30ML; MG/30ML; MG/30ML
30 SUSPENSION ORAL 4 TIMES DAILY PRN
Status: DISCONTINUED | OUTPATIENT
Start: 2024-09-24 | End: 2024-10-03 | Stop reason: HOSPADM

## 2024-09-24 RX ORDER — IBUPROFEN 200 MG
24 TABLET ORAL
Status: DISCONTINUED | OUTPATIENT
Start: 2024-09-24 | End: 2024-10-03 | Stop reason: HOSPADM

## 2024-09-24 RX ORDER — ACETAMINOPHEN 325 MG/1
650 TABLET ORAL EVERY 4 HOURS PRN
Status: DISCONTINUED | OUTPATIENT
Start: 2024-09-24 | End: 2024-09-24

## 2024-09-24 RX ORDER — IPRATROPIUM BROMIDE AND ALBUTEROL SULFATE 2.5; .5 MG/3ML; MG/3ML
3 SOLUTION RESPIRATORY (INHALATION) EVERY 4 HOURS PRN
Status: DISCONTINUED | OUTPATIENT
Start: 2024-09-24 | End: 2024-10-03 | Stop reason: HOSPADM

## 2024-09-24 RX ORDER — OXYCODONE HYDROCHLORIDE 5 MG/1
5 TABLET ORAL EVERY 4 HOURS PRN
Status: DISCONTINUED | OUTPATIENT
Start: 2024-09-24 | End: 2024-10-03 | Stop reason: HOSPADM

## 2024-09-24 RX ORDER — ACETAMINOPHEN 500 MG
1000 TABLET ORAL EVERY 8 HOURS PRN
Status: DISCONTINUED | OUTPATIENT
Start: 2024-09-24 | End: 2024-09-24

## 2024-09-24 RX ORDER — SODIUM CHLORIDE 0.9 % (FLUSH) 0.9 %
5 SYRINGE (ML) INJECTION
Status: DISCONTINUED | OUTPATIENT
Start: 2024-09-24 | End: 2024-10-03 | Stop reason: HOSPADM

## 2024-09-24 RX ORDER — NALOXONE HCL 0.4 MG/ML
0.02 VIAL (ML) INJECTION
Status: DISCONTINUED | OUTPATIENT
Start: 2024-09-24 | End: 2024-10-03 | Stop reason: HOSPADM

## 2024-09-24 RX ORDER — METOPROLOL SUCCINATE 25 MG/1
25 TABLET, EXTENDED RELEASE ORAL DAILY
Status: DISCONTINUED | OUTPATIENT
Start: 2024-09-25 | End: 2024-10-03 | Stop reason: HOSPADM

## 2024-09-24 RX ORDER — INSULIN GLARGINE 100 [IU]/ML
19 INJECTION, SOLUTION SUBCUTANEOUS NIGHTLY
Status: DISCONTINUED | OUTPATIENT
Start: 2024-09-24 | End: 2024-09-27

## 2024-09-24 RX ORDER — ALUMINUM HYDROXIDE, MAGNESIUM HYDROXIDE, AND SIMETHICONE 1200; 120; 1200 MG/30ML; MG/30ML; MG/30ML
30 SUSPENSION ORAL
Status: DISCONTINUED | OUTPATIENT
Start: 2024-09-25 | End: 2024-10-03 | Stop reason: HOSPADM

## 2024-09-24 RX ADMIN — QUETIAPINE FUMARATE 50 MG: 25 TABLET ORAL at 11:09

## 2024-09-24 RX ADMIN — ONDANSETRON 4 MG: 2 INJECTION INTRAMUSCULAR; INTRAVENOUS at 09:09

## 2024-09-24 NOTE — ED NOTES
Osorio Morel is a 29 y.o. female   Chief Complaint   Patient presents with    Skin Problem    pt thinks she is haviong a shingles outbreak on her L side of face but not involing eye. Has had an episode previously and states that it feels the same. Pt also reporting ijcreased anxiety. States the klonopin at 0.5mg is not working that well and we discussed increase to 1mg bid and adding on seroquel for night time use. she is a 29y.o. year old female who presents for evalution. Reviewed PmHx, RxHx, FmHx, SocHx, AllgHx and updated and dated in the chart. Review of Systems - negative except as listed above in the HPI    Objective:     Vitals:    11/10/17 1438   BP: 124/80   Pulse: 93   Resp: 18   Temp: 98.4 °F (36.9 °C)   TempSrc: Oral   SpO2: 97%   Weight: 218 lb (98.9 kg)   Height: 5' 4\" (1.626 m)       Current Outpatient Prescriptions   Medication Sig    QUEtiapine (SEROQUEL) 50 mg tablet Take 1 Tab by mouth nightly.  clonazePAM (KLONOPIN) 1 mg tablet TAKE 1 TABLET BY MOUTH bid    valACYclovir (VALTREX) 1 gram tablet Take 1 Tab by mouth three (3) times daily.  lidocaine-prilocaine (EMLA) topical cream Apply  to affected area as needed for Pain.  busPIRone (BUSPAR) 15 mg tablet TAKE 1 TABLET BY MOUTH TWICE DAILY    FLUoxetine (PROZAC) 40 mg capsule TAKE 1 CAPSULE BY MOUTH DAILY    ciclopirox (PENLAC) 8 % solution Apply to affected area nightly for 60 days    naproxen (NAPROSYN) 500 mg tablet Take 1 Tab by mouth two (2) times daily (with meals).  hydroxychloroquine (PLAQUENIL) 200 mg tablet Take 200 mg by mouth two (2) times a day.  melatonin tab tablet Take 5 mg by mouth nightly.  diphenhydrAMINE-acetaminophen  mg tab Take  by mouth.  CONTRAVE 8-90 mg TbER ER tablet SEE NOTES    cyclobenzaprine (FLEXERIL) 10 mg tablet Take 1 Tab by mouth nightly.     disulfiram (ANTABUSE) 250 mg tablet TAKE ONE TABLET BY MOUTH ONCE DAILY    famotidine (PEPCID) 20 mg tablet     VBG given to respiratory   promethazine (PHENERGAN) 12.5 mg tablet      No current facility-administered medications for this visit. Physical Examination: General appearance - alert, well appearing, and in no distress  Mental status - anxious pacing in room  Eyes - pupils equal and reactive, extraocular eye movements intact  Ears - bilateral TM's and external ear canals normal  Mouth - mucous membranes moist, pharynx normal without lesions  Neck - supple, no significant adenopathy  Chest - clear to auscultation, no wheezes, rales or rhonchi, symmetric air entry  Heart - normal rate, regular rhythm, normal S1, S2, no murmurs, rubs, clicks or gallops  Skin - very small vesicular appearing lesions on L cheek and one right next to L eye      Assessment/ Plan:   Diagnoses and all orders for this visit:    1. Herpes zoster with ophthalmic complication, unspecified herpes zoster eye disease  -     valACYclovir (VALTREX) 1 gram tablet; Take 1 Tab by mouth three (3) times daily. -     lidocaine-prilocaine (EMLA) topical cream; Apply  to affected area as needed for Pain. Appears as beginning stages will start tx today. 2. Anxiety  -     QUEtiapine (SEROQUEL) 50 mg tablet; Take 1 Tab by mouth nightly. -     clonazePAM (KLONOPIN) 1 mg tablet; TAKE 1 TABLET BY MOUTH bid     advised to schedule appt with optho to check for any ocular involvement  Follow-up Disposition:  Return in about 1 month (around 12/10/2017), or if symptoms worsen or fail to improve. I have discussed the diagnosis with the patient and the intended plan as seen in the above orders. The patient has received an after-visit summary and questions were answered concerning future plans. Pt conveyed understanding of plan.     Medication Side Effects and Warnings were discussed with patient      Lew Bryant DO

## 2024-09-24 NOTE — ED PROVIDER NOTES
Encounter Date: 9/24/2024       History     Chief Complaint   Patient presents with    Post-op Problem     Arrived via  ems from home with c/o wound check to  surgical site, site opened today, noted bleeding from site, dressed prior to EMS arrival     HPI    73-year-old female with extensive past medical history as noted and reviewed below    Patient suffered a pilon fracture on 08/14/2024 and had surgery at this facility.  She had her sutures removed in 912th 2024.  She had been staying in a nursing home up until today.  She currently has an indwelling Benoit catheter improved since a fall postoperatively.  She reports that she had some increased swelling to her right ankle and subsequent wound dehiscence that has progressed until today.  She denies fever chills but does report that she had some decreased appetite today.  She now feels nauseated due to not eating.  She denies any more falls.  She denies other injury.  Review of patient's allergies indicates:   Allergen Reactions    Novolin 70/30 (semi-synthetic) Nausea And Vomiting     Severe vomiting on Relion 70/30    Sulfa (sulfonamide antibiotics) Anaphylaxis    Talwin [pentazocine lactate] Anaphylaxis    Victoza [liraglutide] Nausea And Vomiting    Glipizide Nausea Only    Citric acid     Codeine     Influenza virus vaccines Hives    Iodine and iodide containing products Hives    Levetiracetam Itching    Neurontin [gabapentin]      Possible associated myoclonic jerk    Rituxan [rituximab] Hives    Zoloft [sertraline] Nausea And Vomiting     Past Medical History:   Diagnosis Date    Allergy     Altered mental status 06/19/2022    DYSARTHRIA, SPASTIC MOVEMENTS & DIFFICULTY SWALLOWING    Anemia     Anxiety     Arthritis     Cataract     both removed    Colon polyps     Coronary artery disease     Depression     Diabetes mellitus, type II     Disorder of kidney and ureter     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/2023    Fibromyalgia     Follicular lymphoma     GERD  (gastroesophageal reflux disease)     HTN (hypertension)     Hyperlipidemia     MI (myocardial infarction) 03/2019    Personal history of colonic polyps     Restless leg syndrome     Stroke      Past Surgical History:   Procedure Laterality Date    COLONOSCOPY  11/07/2012    Colon polyp found; repeat in 5 years    ELBOW SURGERY Right 2015    dislocation repair     ESOPHAGOGASTRODUODENOSCOPY  11/07/2012    atrophic gastritis, H pylori testing negative    INCISION AND DRAINAGE FOOT Right 6/2/2021    Procedure: INCISION AND DRAINAGE, FOOT, bone biopsy;  Surgeon: Quiana Penn DPM;  Location: [facility] OR;  Service: Podiatry;  Laterality: Right;    KNEE SURGERY Bilateral 2015    scoped    LEFT HEART CATHETERIZATION Left 3/29/2019    Procedure: Left heart cath;  Surgeon: Bladimir Barbosa MD;  Location: [facility] CATH LAB;  Service: Cardiology;  Laterality: Left;    LEFT HEART CATHETERIZATION Left 11/18/2019    Procedure: Left heart cath;  Surgeon: Karl Rico MD;  Location: [facility] CATH LAB;  Service: Cardiology;  Laterality: Left;    LEFT HEART CATHETERIZATION Left 1/8/2020    Procedure: Left heart cath, right radial, noon start;  Surgeon: Christos Monreal MD;  Location: [facility] CATH LAB;  Service: Cardiology;  Laterality: Left;  RN Pre Op 1-6-20.  To be admitted 1-7-20 sor Aspirin Disensitation    OPEN REDUCTION AND INTERNAL FIXATION (ORIF) OF FRACTURE OF ACETABULUM Right 8/16/2024    Procedure: ORIF, FRACTURE, ACETABULUM;  Surgeon: Neto Davalos MD;  Location: [address];  Service: Orthopedics;  Laterality: Right;  anterior and lateral pelvic incisions    OPEN REDUCTION AND INTERNAL FIXATION (ORIF) OF PILON FRACTURE Right 8/14/2024    Procedure: ORIF, FRACTURE, PILON;  Surgeon: Neto Davalos MD;  Location: [facility] OR [address] [place] [address];  Service: Orthopedics;  Laterality: Right;    TONSILLECTOMY  1955    ULTRASOUND GUIDANCE  1/8/2020    Procedure: Ultrasound Guidance;  Surgeon: Christos Monreal MD;  Location:  [facility] CATH LAB;  Service: Cardiology;;     Family History   Problem Relation Name Age of Onset    Cancer Mother          colon    Heart disease Mother      Cancer Father          lung    Lung cancer Brother      Diabetes Sister      Hypertension Sister      Allergy (severe) Daughter erin     No Known Problems Daughter      Stroke Neg Hx      Hyperlipidemia Neg Hx       Social History     Tobacco Use    Smoking status: Never    Smokeless tobacco: Never   Substance Use Topics    Alcohol use: Not Currently    Drug use: Never     Review of Systems  All other systems reviewed and negative except as noted in HPI    Physical Exam     Initial Vitals [09/24/24 1538]   BP Pulse Resp Temp SpO2   (!) 142/72 99 18 98.1 °F (36.7 °C) (!) 93 %      MAP       --         Physical Exam    Nursing note and vitals reviewed.  Constitutional: She appears well-developed and well-nourished.   HENT:   Head: Normocephalic and atraumatic.   Eyes: EOM are normal. Pupils are equal, round, and reactive to light.   Neck: Neck supple.   Normal range of motion.  Cardiovascular:  Normal rate, regular rhythm and intact distal pulses.           Pulmonary/Chest: Breath sounds normal. No respiratory distress.   Abdominal: Abdomen is soft. She exhibits no distension.   Genitourinary:    Genitourinary Comments: Benoit catheter in place.  No blood in tubing or bag     Musculoskeletal:      Cervical back: Normal range of motion and neck supple.      Right ankle: Swelling present. No deformity, ecchymosis or lacerations. Decreased range of motion. Anterior drawer test negative. Normal pulse.      Right Achilles Tendon: Normal.      Left ankle: Normal.        Legs:       Comments: Large curvilinear surgical incision to right medial distal ankle with Steri-Strips in place but broadly dehisced with visible deep subcutaneous tissue     Neurological: She is alert and oriented to person, place, and time. She has normal strength. No sensory deficit. GCS score is 15.  GCS eye subscore is 4. GCS verbal subscore is 5. GCS motor subscore is 6.   Psychiatric: She has a normal mood and affect.         ED Course   Procedures  Labs Reviewed   CBC W/ AUTO DIFFERENTIAL - Abnormal       Result Value    WBC 9.42      RBC 2.67 (*)     Hemoglobin 7.9 (*)     Hematocrit 25.9 (*)     MCV 97      MCH 29.6      MCHC 30.5 (*)     RDW 15.4 (*)     Platelets 213      MPV 12.5      Immature Granulocytes 0.4      Gran # (ANC) 8.0 (*)     Immature Grans (Abs) 0.04      Lymph # 0.8 (*)     Mono # 0.5      Eos # 0.0      Baso # 0.06      nRBC 0      Gran % 85.0 (*)     Lymph % 8.6 (*)     Mono % 5.0      Eosinophil % 0.4      Basophil % 0.6      Differential Method Automated     COMPREHENSIVE METABOLIC PANEL - Abnormal    Sodium 132 (*)     Potassium 4.5      Chloride 102      CO2 22 (*)     Glucose 232 (*)     BUN 36 (*)     Creatinine 2.0 (*)     Calcium 8.4 (*)     Total Protein 6.0      Albumin 2.2 (*)     Total Bilirubin 0.4      Alkaline Phosphatase 476 (*)      (*)      (*)     eGFR 25.9 (*)     Anion Gap 8     C-REACTIVE PROTEIN - Abnormal    CRP 9.3 (*)    SEDIMENTATION RATE - Abnormal    Sed Rate 49 (*)    ISTAT LACTATE    POC Lactate 0.91      Sample VENOUS      Site Other      Allens Test N/A          ECG Results              EKG 12-lead (Final result)        Collection Time Result Time QRS Duration OHS QTC Calculation    09/24/24 22:54:29 09/25/24 08:59:36 78 477                     Final result by Interface, Lab In [facility] (09/25/24 08:59:44)                   Narrative:    Test Reason : Z01.818,    Vent. Rate : 099 BPM     Atrial Rate : 099 BPM     P-R Int : 196 ms          QRS Dur : 078 ms      QT Int : 372 ms       P-R-T Axes : 083 014 -13 degrees     QTc Int : 477 ms    Normal sinus rhythm with sinus arrhythmia  Inferior infarct (cited on or before 29-AUG-2024)  Nonspecific ST and/or T wave abnormalities  Abnormal ECG  When compared with ECG of 29-AUG-2024 10:57,  No  significant change was found  Confirmed by Stephanie Nolasco MD ([id number]) on 9/25/2024 8:59:31 AM    Referred By: [email or web address]   SELF           Confirmed By:Stephanie Nolasco MD                                  Imaging Results              X-Ray Chest AP Portable (Final result)  Result time 09/24/24 23:43:26      Final result by Maye Zhang MD (09/24/24 23:43:26)                   Impression:      Bilateral perihilar mixed opacities suggesting congestive changes.  Correlate for pneumonitis.      Electronically signed by: Maye Zhang  Date:    09/24/2024  Time:    23:43               Narrative:    EXAMINATION:  XR CHEST AP PORTABLE    CLINICAL HISTORY:  preop cxr;    TECHNIQUE:  Single frontal view of the chest was performed.    COMPARISON:  None    FINDINGS:  The cardiac silhouette is stable and not significantly enlarged.  Acute bilateral perihilar opacities and vascular congestive changes are noted.  There is no sizable pleural effusion or pneumothorax.                                       X-Ray Ankle Complete Right (Final result)  Result time 09/24/24 22:30:13      Final result by Javier Zhang MD (09/24/24 22:30:13)                   Impression:      Stable right ankle since 09/08/2024.    Correlate for cellulitis.    No evidence of fracture or dislocation.      Electronically signed by: Javier Zhang  Date:    09/24/2024  Time:    22:30               Narrative:    EXAMINATION:  XR ANKLE COMPLETE 3 VIEW RIGHT    CLINICAL HISTORY:  Wound dehiscence after pilon surgery;    TECHNIQUE:  AP, lateral, and oblique images of the right ankle were performed.    COMPARISON:  09/08/2024    FINDINGS:  Orthopedic hardware appear stable.  No acute fracture or soft tissue swelling.  Soft tissue irregularity and density in the subcutaneous soft tissues of the heel and distal leg raise the question of cellulitis.  Is no soft tissue gas.                                       Medications   oxyCODONE immediate release tablet  5 mg (has no administration in time range)   ondansetron tablet 4 mg (has no administration in time range)   sodium chloride 0.9% flush 5 mL (has no administration in time range)   albuterol-ipratropium 2.5 mg-0.5 mg/3 mL nebulizer solution 3 mL (has no administration in time range)   melatonin tablet 6 mg (has no administration in time range)   prochlorperazine injection Soln 5 mg (has no administration in time range)   polyethylene glycol packet 17 g (has no administration in time range)   simethicone chewable tablet 80 mg (has no administration in time range)   aluminum-magnesium hydroxide-simethicone 200-200-20 mg/5 mL suspension 30 mL (has no administration in time range)   naloxone 0.4 mg/mL injection 0.02 mg (has no administration in time range)   glucose chewable tablet 16 g (has no administration in time range)   glucose chewable tablet 24 g (has no administration in time range)   glucagon (human recombinant) injection 1 mg (has no administration in time range)   dextrose 10% bolus 125 mL 125 mL (has no administration in time range)   dextrose 10% bolus 250 mL 250 mL (has no administration in time range)   FLUoxetine capsule 40 mg (40 mg Oral Given 9/25/24 0852)   hydrOXYzine HCL tablet 25 mg (25 mg Oral Given 9/25/24 1701)   insulin glargine U-100 (Lantus) pen 19 Units (19 Units Subcutaneous Not Given 9/24/24 2342)   isosorbide mononitrate 24 hr tablet 60 mg (60 mg Oral Given 9/25/24 0852)   metoprolol succinate (TOPROL-XL) 24 hr tablet 25 mg (25 mg Oral Given 9/25/24 0852)   sucralfate 100 mg/mL suspension 1 g (1 g Oral Given 9/25/24 1804)   aluminum-magnesium hydroxide-simethicone 200-200-20 mg/5 mL suspension 30 mL (30 mLs Oral Given 9/25/24 1651)   QUEtiapine tablet 50 mg (50 mg Oral Given 9/24/24 2342)   insulin aspart U-100 pen 0-5 Units (has no administration in time range)   cefTRIAXone (Rocephin) 1 g in D5W 100 mL IVPB (MB+) (1 g Intravenous Canceled Entry 9/25/24 1301)   methocarbamoL tablet 500  mg (500 mg Oral Given 9/25/24 1651)   furosemide injection 40 mg (has no administration in time range)   aspirin chewable tablet 81 mg (has no administration in time range)   ondansetron injection 4 mg (4 mg Intravenous Given 9/24/24 2124)   furosemide injection 20 mg (20 mg Intravenous Given 9/25/24 0547)   0.9%  NaCl infusion ( Intravenous New Bag 9/25/24 1232)     Medical Decision Making  See ED course for additional HPI, ROS, PE, lab, imaging, consultant discussion, procedure interpretation, and Medical Decision Making.      Amount and/or Complexity of Data Reviewed  External Data Reviewed: labs, radiology, ECG and notes.     Details: Surgery 8/14-16/24  Suture removal 9/12/24  Labs: ordered. Decision-making details documented in ED Course.  Radiology: ordered and independent interpretation performed. Decision-making details documented in ED Course.  ECG/medicine tests: ordered and independent interpretation performed. Decision-making details documented in ED Course.     Details: 99 beats per minute   No STEMI   Normal sinus rhythm with sinus arrhythmia  No significant change from previous    Risk  Prescription drug management.  Decision regarding hospitalization.  Elective major surgery with identified risk factors.               ED Course as of 09/25/24 2100   Tue Sep 24, 2024   1722 Discussed with orthopedics on-call.  White blood cell count is reassuring.  CRP is reassuring.  Will plan for IV antibiotics versus operative intervention in the morning with deep cultures followed by antibiotics at that time.  Will plan for admission to Hospital Medicine given wound dehiscence, progressive transaminitis, progressive anemia. [DS]      ED Course User Index  [DS] Sessions, Rey VALDEZ MD                           Clinical Impression:  Final diagnoses:  [T81.31XA] Postoperative dehiscence of skin wound, initial encounter (Primary)          ED Disposition Condition    Observation                 Sessions, Rey VALDEZ,  MD  09/25/24 [id number]

## 2024-09-25 ENCOUNTER — ANESTHESIA (OUTPATIENT)
Dept: SURGERY | Facility: HOSPITAL | Age: 74
End: 2024-09-25
Payer: MEDICARE

## 2024-09-25 PROBLEM — I25.2 HISTORY OF MYOCARDIAL INFARCTION: Status: RESOLVED | Noted: 2020-02-06 | Resolved: 2024-09-25

## 2024-09-25 PROBLEM — E83.51 HYPOCALCEMIA: Status: RESOLVED | Noted: 2024-08-17 | Resolved: 2024-09-25

## 2024-09-25 PROBLEM — E83.42 HYPOMAGNESEMIA: Status: RESOLVED | Noted: 2019-03-31 | Resolved: 2024-09-25

## 2024-09-25 PROBLEM — K59.00 CONSTIPATION: Status: RESOLVED | Noted: 2024-08-18 | Resolved: 2024-09-25

## 2024-09-25 PROBLEM — N17.9 AKI (ACUTE KIDNEY INJURY): Status: RESOLVED | Noted: 2023-04-10 | Resolved: 2024-09-25

## 2024-09-25 PROBLEM — R41.0 DELIRIUM: Status: RESOLVED | Noted: 2024-08-18 | Resolved: 2024-09-25

## 2024-09-25 PROBLEM — E87.20 METABOLIC ACIDOSIS: Status: RESOLVED | Noted: 2024-08-15 | Resolved: 2024-09-25

## 2024-09-25 PROBLEM — R10.9 ABDOMINAL PAIN: Status: RESOLVED | Noted: 2024-08-18 | Resolved: 2024-09-25

## 2024-09-25 PROBLEM — N18.4 CHRONIC KIDNEY DISEASE (CKD), STAGE IV (SEVERE): Status: ACTIVE | Noted: 2024-09-25

## 2024-09-25 PROBLEM — I67.4 HYPERTENSIVE ENCEPHALOPATHY: Status: RESOLVED | Noted: 2022-06-19 | Resolved: 2024-09-25

## 2024-09-25 PROBLEM — E11.22 TYPE 2 DIABETES MELLITUS WITH STAGE 3B CHRONIC KIDNEY DISEASE, WITH LONG-TERM CURRENT USE OF INSULIN: Status: ACTIVE | Noted: 2023-08-24

## 2024-09-25 PROBLEM — N18.32 TYPE 2 DIABETES MELLITUS WITH STAGE 3B CHRONIC KIDNEY DISEASE, WITH LONG-TERM CURRENT USE OF INSULIN: Status: ACTIVE | Noted: 2023-08-24

## 2024-09-25 PROBLEM — I50.33 ACUTE ON CHRONIC DIASTOLIC HEART FAILURE: Status: ACTIVE | Noted: 2021-08-10

## 2024-09-25 PROBLEM — N18.31 STAGE 3A CHRONIC KIDNEY DISEASE: Chronic | Status: RESOLVED | Noted: 2019-12-10 | Resolved: 2024-09-25

## 2024-09-25 PROBLEM — R79.89 ELEVATED TROPONIN: Status: RESOLVED | Noted: 2023-11-03 | Resolved: 2024-09-25

## 2024-09-25 PROBLEM — N18.4 CHRONIC KIDNEY DISEASE (CKD), STAGE IV (SEVERE): Status: RESOLVED | Noted: 2024-09-25 | Resolved: 2024-09-25

## 2024-09-25 LAB
ALBUMIN SERPL BCP-MCNC: 2.2 G/DL (ref 3.5–5.2)
ALP SERPL-CCNC: 418 U/L (ref 55–135)
ALT SERPL W/O P-5'-P-CCNC: 122 U/L (ref 10–44)
ANION GAP SERPL CALC-SCNC: 9 MMOL/L (ref 8–16)
AST SERPL-CCNC: 92 U/L (ref 10–40)
BACTERIA #/AREA URNS AUTO: ABNORMAL /HPF
BILIRUB SERPL-MCNC: 0.4 MG/DL (ref 0.1–1)
BILIRUB UR QL STRIP: NEGATIVE
BNP SERPL-MCNC: 1373 PG/ML (ref 0–99)
BUN SERPL-MCNC: 35 MG/DL (ref 8–23)
CALCIUM SERPL-MCNC: 8.9 MG/DL (ref 8.7–10.5)
CHLORIDE SERPL-SCNC: 104 MMOL/L (ref 95–110)
CLARITY UR REFRACT.AUTO: ABNORMAL
CO2 SERPL-SCNC: 21 MMOL/L (ref 23–29)
COLOR UR AUTO: ABNORMAL
CREAT SERPL-MCNC: 2 MG/DL (ref 0.5–1.4)
ERYTHROCYTE [DISTWIDTH] IN BLOOD BY AUTOMATED COUNT: 15.6 % (ref 11.5–14.5)
EST. GFR  (NO RACE VARIABLE): 25.9 ML/MIN/1.73 M^2
GLUCOSE SERPL-MCNC: 126 MG/DL (ref 70–110)
GLUCOSE UR QL STRIP: NEGATIVE
HAV IGM SERPL QL IA: NORMAL
HBV CORE IGM SERPL QL IA: NORMAL
HBV SURFACE AG SERPL QL IA: NORMAL
HCT VFR BLD AUTO: 24.1 % (ref 37–48.5)
HCV AB SERPL QL IA: NORMAL
HGB BLD-MCNC: 7.4 G/DL (ref 12–16)
HGB UR QL STRIP: ABNORMAL
HYALINE CASTS UR QL AUTO: 0 /LPF
KETONES UR QL STRIP: NEGATIVE
LEUKOCYTE ESTERASE UR QL STRIP: ABNORMAL
MAGNESIUM SERPL-MCNC: 1.7 MG/DL (ref 1.6–2.6)
MCH RBC QN AUTO: 29.5 PG (ref 27–31)
MCHC RBC AUTO-ENTMCNC: 30.7 G/DL (ref 32–36)
MCV RBC AUTO: 96 FL (ref 82–98)
MICROSCOPIC COMMENT: ABNORMAL
NITRITE UR QL STRIP: NEGATIVE
OHS QRS DURATION: 78 MS
OHS QTC CALCULATION: 477 MS
PH UR STRIP: 6 [PH] (ref 5–8)
PLATELET # BLD AUTO: 210 K/UL (ref 150–450)
PMV BLD AUTO: 12.5 FL (ref 9.2–12.9)
POCT GLUCOSE: 142 MG/DL (ref 70–110)
POCT GLUCOSE: 164 MG/DL (ref 70–110)
POCT GLUCOSE: 184 MG/DL (ref 70–110)
POCT GLUCOSE: 186 MG/DL (ref 70–110)
POCT GLUCOSE: 199 MG/DL (ref 70–110)
POCT GLUCOSE: 91 MG/DL (ref 70–110)
POTASSIUM SERPL-SCNC: 4.2 MMOL/L (ref 3.5–5.1)
PROT SERPL-MCNC: 5.9 G/DL (ref 6–8.4)
PROT UR QL STRIP: ABNORMAL
RBC # BLD AUTO: 2.51 M/UL (ref 4–5.4)
RBC #/AREA URNS AUTO: 6 /HPF (ref 0–4)
SODIUM SERPL-SCNC: 134 MMOL/L (ref 136–145)
SP GR UR STRIP: 1.01 (ref 1–1.03)
URN SPEC COLLECT METH UR: ABNORMAL
WBC # BLD AUTO: 9.03 K/UL (ref 3.9–12.7)
WBC #/AREA URNS AUTO: >100 /HPF (ref 0–5)
YEAST UR QL AUTO: ABNORMAL

## 2024-09-25 PROCEDURE — 63600175 PHARM REV CODE 636 W HCPCS: Mod: HCNC | Performed by: ORTHOPAEDIC SURGERY

## 2024-09-25 PROCEDURE — 36415 COLL VENOUS BLD VENIPUNCTURE: CPT | Mod: HCNC

## 2024-09-25 PROCEDURE — 87088 URINE BACTERIA CULTURE: CPT | Mod: HCNC

## 2024-09-25 PROCEDURE — 87070 CULTURE OTHR SPECIMN AEROBIC: CPT | Mod: HCNC | Performed by: ORTHOPAEDIC SURGERY

## 2024-09-25 PROCEDURE — 80074 ACUTE HEPATITIS PANEL: CPT | Mod: HCNC

## 2024-09-25 PROCEDURE — 15738 MUSCLE-SKIN GRAFT LEG: CPT | Mod: 78,HCNC,, | Performed by: ORTHOPAEDIC SURGERY

## 2024-09-25 PROCEDURE — 25000003 PHARM REV CODE 250: Mod: HCNC | Performed by: NURSE ANESTHETIST, CERTIFIED REGISTERED

## 2024-09-25 PROCEDURE — 83880 ASSAY OF NATRIURETIC PEPTIDE: CPT | Mod: HCNC

## 2024-09-25 PROCEDURE — 25000003 PHARM REV CODE 250: Mod: HCNC

## 2024-09-25 PROCEDURE — 63600175 PHARM REV CODE 636 W HCPCS: Mod: HCNC

## 2024-09-25 PROCEDURE — 99900035 HC TECH TIME PER 15 MIN (STAT): Mod: HCNC

## 2024-09-25 PROCEDURE — 81001 URINALYSIS AUTO W/SCOPE: CPT | Mod: HCNC

## 2024-09-25 PROCEDURE — 25000003 PHARM REV CODE 250: Mod: HCNC | Performed by: INTERNAL MEDICINE

## 2024-09-25 PROCEDURE — 88305 TISSUE EXAM BY PATHOLOGIST: CPT | Mod: HCNC | Performed by: PATHOLOGY

## 2024-09-25 PROCEDURE — 87176 TISSUE HOMOGENIZATION CULTR: CPT | Mod: 91,HCNC | Performed by: ORTHOPAEDIC SURGERY

## 2024-09-25 PROCEDURE — 87186 SC STD MICRODIL/AGAR DIL: CPT | Mod: HCNC

## 2024-09-25 PROCEDURE — 37000008 HC ANESTHESIA 1ST 15 MINUTES: Mod: HCNC | Performed by: ORTHOPAEDIC SURGERY

## 2024-09-25 PROCEDURE — 88305 TISSUE EXAM BY PATHOLOGIST: CPT | Mod: 26,HCNC,, | Performed by: PATHOLOGY

## 2024-09-25 PROCEDURE — 63600175 PHARM REV CODE 636 W HCPCS: Mod: HCNC | Performed by: SURGERY

## 2024-09-25 PROCEDURE — 82962 GLUCOSE BLOOD TEST: CPT | Mod: HCNC | Performed by: ORTHOPAEDIC SURGERY

## 2024-09-25 PROCEDURE — 94761 N-INVAS EAR/PLS OXIMETRY MLT: CPT | Mod: HCNC

## 2024-09-25 PROCEDURE — 83735 ASSAY OF MAGNESIUM: CPT | Mod: HCNC

## 2024-09-25 PROCEDURE — 37000009 HC ANESTHESIA EA ADD 15 MINS: Mod: HCNC | Performed by: ORTHOPAEDIC SURGERY

## 2024-09-25 PROCEDURE — 36000706: Mod: HCNC | Performed by: ORTHOPAEDIC SURGERY

## 2024-09-25 PROCEDURE — 88311 DECALCIFY TISSUE: CPT | Mod: 26,HCNC,, | Performed by: PATHOLOGY

## 2024-09-25 PROCEDURE — 63600175 PHARM REV CODE 636 W HCPCS: Mod: HCNC | Performed by: NURSE ANESTHETIST, CERTIFIED REGISTERED

## 2024-09-25 PROCEDURE — 99024 POSTOP FOLLOW-UP VISIT: CPT | Mod: HCNC,,, | Performed by: ORTHOPAEDIC SURGERY

## 2024-09-25 PROCEDURE — 63600175 PHARM REV CODE 636 W HCPCS: Mod: HCNC | Performed by: INTERNAL MEDICINE

## 2024-09-25 PROCEDURE — 87086 URINE CULTURE/COLONY COUNT: CPT | Mod: HCNC

## 2024-09-25 PROCEDURE — 71000033 HC RECOVERY, INTIAL HOUR: Mod: HCNC | Performed by: ORTHOPAEDIC SURGERY

## 2024-09-25 PROCEDURE — 27000221 HC OXYGEN, UP TO 24 HOURS: Mod: HCNC

## 2024-09-25 PROCEDURE — 21400001 HC TELEMETRY ROOM: Mod: HCNC

## 2024-09-25 PROCEDURE — 85027 COMPLETE CBC AUTOMATED: CPT | Mod: HCNC

## 2024-09-25 PROCEDURE — 87102 FUNGUS ISOLATION CULTURE: CPT | Mod: HCNC | Performed by: ORTHOPAEDIC SURGERY

## 2024-09-25 PROCEDURE — 36000707: Mod: HCNC | Performed by: ORTHOPAEDIC SURGERY

## 2024-09-25 PROCEDURE — 80053 COMPREHEN METABOLIC PANEL: CPT | Mod: HCNC

## 2024-09-25 PROCEDURE — 27201423 OPTIME MED/SURG SUP & DEVICES STERILE SUPPLY: Mod: HCNC | Performed by: ORTHOPAEDIC SURGERY

## 2024-09-25 PROCEDURE — 71000015 HC POSTOP RECOV 1ST HR: Mod: HCNC | Performed by: ORTHOPAEDIC SURGERY

## 2024-09-25 PROCEDURE — 87075 CULTR BACTERIA EXCEPT BLOOD: CPT | Mod: 59,HCNC | Performed by: ORTHOPAEDIC SURGERY

## 2024-09-25 PROCEDURE — 88311 DECALCIFY TISSUE: CPT | Mod: HCNC | Performed by: PATHOLOGY

## 2024-09-25 RX ORDER — SODIUM CHLORIDE 9 MG/ML
INJECTION, SOLUTION INTRAVENOUS ONCE
Status: COMPLETED | OUTPATIENT
Start: 2024-09-25 | End: 2024-09-25

## 2024-09-25 RX ORDER — PROCHLORPERAZINE EDISYLATE 5 MG/ML
5 INJECTION INTRAMUSCULAR; INTRAVENOUS EVERY 30 MIN PRN
Status: DISCONTINUED | OUTPATIENT
Start: 2024-09-25 | End: 2024-09-25 | Stop reason: HOSPADM

## 2024-09-25 RX ORDER — FENTANYL CITRATE 50 UG/ML
INJECTION, SOLUTION INTRAMUSCULAR; INTRAVENOUS
Status: DISCONTINUED | OUTPATIENT
Start: 2024-09-25 | End: 2024-09-25

## 2024-09-25 RX ORDER — ROPIVACAINE HYDROCHLORIDE 5 MG/ML
INJECTION, SOLUTION EPIDURAL; INFILTRATION; PERINEURAL
Status: COMPLETED | OUTPATIENT
Start: 2024-09-25 | End: 2024-09-25

## 2024-09-25 RX ORDER — MIDAZOLAM HYDROCHLORIDE 1 MG/ML
INJECTION INTRAMUSCULAR; INTRAVENOUS
Status: DISCONTINUED | OUTPATIENT
Start: 2024-09-25 | End: 2024-09-25

## 2024-09-25 RX ORDER — HALOPERIDOL 5 MG/ML
0.5 INJECTION INTRAMUSCULAR EVERY 10 MIN PRN
Status: DISCONTINUED | OUTPATIENT
Start: 2024-09-25 | End: 2024-09-25 | Stop reason: HOSPADM

## 2024-09-25 RX ORDER — SODIUM CHLORIDE 0.9 % (FLUSH) 0.9 %
10 SYRINGE (ML) INJECTION
Status: CANCELLED | OUTPATIENT
Start: 2024-09-25

## 2024-09-25 RX ORDER — METOPROLOL TARTRATE 1 MG/ML
INJECTION, SOLUTION INTRAVENOUS
Status: DISCONTINUED | OUTPATIENT
Start: 2024-09-25 | End: 2024-09-25

## 2024-09-25 RX ORDER — FENTANYL CITRATE 50 UG/ML
25 INJECTION, SOLUTION INTRAMUSCULAR; INTRAVENOUS EVERY 5 MIN PRN
Status: DISCONTINUED | OUTPATIENT
Start: 2024-09-25 | End: 2024-09-25 | Stop reason: HOSPADM

## 2024-09-25 RX ORDER — NAPROXEN SODIUM 220 MG/1
81 TABLET, FILM COATED ORAL DAILY
Status: DISCONTINUED | OUTPATIENT
Start: 2024-09-26 | End: 2024-09-27

## 2024-09-25 RX ORDER — VANCOMYCIN HYDROCHLORIDE 1 G/20ML
INJECTION, POWDER, LYOPHILIZED, FOR SOLUTION INTRAVENOUS
Status: DISCONTINUED | OUTPATIENT
Start: 2024-09-25 | End: 2024-09-25 | Stop reason: HOSPADM

## 2024-09-25 RX ORDER — FUROSEMIDE 10 MG/ML
20 INJECTION INTRAMUSCULAR; INTRAVENOUS ONCE
Status: COMPLETED | OUTPATIENT
Start: 2024-09-25 | End: 2024-09-25

## 2024-09-25 RX ORDER — METHOCARBAMOL 500 MG/1
500 TABLET, FILM COATED ORAL 4 TIMES DAILY
Status: DISCONTINUED | OUTPATIENT
Start: 2024-09-25 | End: 2024-10-03 | Stop reason: HOSPADM

## 2024-09-25 RX ORDER — METHOCARBAMOL 500 MG/1
500 TABLET, FILM COATED ORAL 4 TIMES DAILY PRN
Status: DISCONTINUED | OUTPATIENT
Start: 2024-09-25 | End: 2024-09-25

## 2024-09-25 RX ORDER — DIPHENHYDRAMINE HYDROCHLORIDE 50 MG/ML
INJECTION INTRAMUSCULAR; INTRAVENOUS
Status: DISCONTINUED | OUTPATIENT
Start: 2024-09-25 | End: 2024-09-25

## 2024-09-25 RX ORDER — TOBRAMYCIN 1.2 G/30ML
INJECTION, POWDER, LYOPHILIZED, FOR SOLUTION INTRAVENOUS
Status: DISCONTINUED | OUTPATIENT
Start: 2024-09-25 | End: 2024-09-25 | Stop reason: HOSPADM

## 2024-09-25 RX ORDER — MUPIROCIN 20 MG/G
OINTMENT TOPICAL
Status: CANCELLED | OUTPATIENT
Start: 2024-09-25

## 2024-09-25 RX ORDER — ONDANSETRON HYDROCHLORIDE 2 MG/ML
INJECTION, SOLUTION INTRAVENOUS
Status: DISCONTINUED | OUTPATIENT
Start: 2024-09-25 | End: 2024-09-25

## 2024-09-25 RX ORDER — FUROSEMIDE 10 MG/ML
40 INJECTION INTRAMUSCULAR; INTRAVENOUS EVERY 12 HOURS
Status: DISCONTINUED | OUTPATIENT
Start: 2024-09-25 | End: 2024-09-30

## 2024-09-25 RX ORDER — HYDROCODONE BITARTRATE AND ACETAMINOPHEN 500; 5 MG/1; MG/1
TABLET ORAL
Status: CANCELLED | OUTPATIENT
Start: 2024-09-25

## 2024-09-25 RX ADMIN — ROPIVACAINE HYDROCHLORIDE 20 ML: 5 INJECTION, SOLUTION EPIDURAL; INFILTRATION; PERINEURAL at 12:09

## 2024-09-25 RX ADMIN — HYDROXYZINE HYDROCHLORIDE 25 MG: 25 TABLET, FILM COATED ORAL at 05:09

## 2024-09-25 RX ADMIN — FENTANYL CITRATE 25 MCG: 50 INJECTION, SOLUTION INTRAMUSCULAR; INTRAVENOUS at 02:09

## 2024-09-25 RX ADMIN — ALUMINUM HYDROXIDE, MAGNESIUM HYDROXIDE, AND SIMETHICONE 30 ML: 1200; 120; 1200 SUSPENSION ORAL at 05:09

## 2024-09-25 RX ADMIN — MIDAZOLAM HYDROCHLORIDE 1 MG: 2 INJECTION, SOLUTION INTRAMUSCULAR; INTRAVENOUS at 12:09

## 2024-09-25 RX ADMIN — SODIUM CHLORIDE: 9 INJECTION, SOLUTION INTRAVENOUS at 12:09

## 2024-09-25 RX ADMIN — METHOCARBAMOL 500 MG: 500 TABLET ORAL at 04:09

## 2024-09-25 RX ADMIN — METOPROLOL TARTRATE 2.5 MG: 5 INJECTION, SOLUTION INTRAVENOUS at 01:09

## 2024-09-25 RX ADMIN — ROPIVACAINE HYDROCHLORIDE 30 ML: 5 INJECTION EPIDURAL; INFILTRATION; PERINEURAL at 12:09

## 2024-09-25 RX ADMIN — METOPROLOL SUCCINATE 25 MG: 25 TABLET, EXTENDED RELEASE ORAL at 08:09

## 2024-09-25 RX ADMIN — ALUMINUM HYDROXIDE, MAGNESIUM HYDROXIDE, AND SIMETHICONE 30 ML: 1200; 120; 1200 SUSPENSION ORAL at 09:09

## 2024-09-25 RX ADMIN — METHOCARBAMOL 500 MG: 500 TABLET ORAL at 09:09

## 2024-09-25 RX ADMIN — SUCRALFATE 1 G: 1 SUSPENSION ORAL at 05:09

## 2024-09-25 RX ADMIN — ACETAMINOPHEN 650 MG: 325 TABLET ORAL at 05:09

## 2024-09-25 RX ADMIN — DIPHENHYDRAMINE HYDROCHLORIDE 12.5 MG: 50 INJECTION, SOLUTION INTRAMUSCULAR; INTRAVENOUS at 01:09

## 2024-09-25 RX ADMIN — ALUMINUM HYDROXIDE, MAGNESIUM HYDROXIDE, AND SIMETHICONE 30 ML: 1200; 120; 1200 SUSPENSION ORAL at 04:09

## 2024-09-25 RX ADMIN — ONDANSETRON 4 MG: 2 INJECTION INTRAMUSCULAR; INTRAVENOUS at 12:09

## 2024-09-25 RX ADMIN — FENTANYL CITRATE 25 MCG: 50 INJECTION, SOLUTION INTRAMUSCULAR; INTRAVENOUS at 01:09

## 2024-09-25 RX ADMIN — SODIUM CHLORIDE: 0.9 INJECTION, SOLUTION INTRAVENOUS at 12:09

## 2024-09-25 RX ADMIN — QUETIAPINE FUMARATE 50 MG: 25 TABLET ORAL at 09:09

## 2024-09-25 RX ADMIN — VANCOMYCIN HYDROCHLORIDE 1000 MG: 1 INJECTION, POWDER, LYOPHILIZED, FOR SOLUTION INTRAVENOUS at 01:09

## 2024-09-25 RX ADMIN — ISOSORBIDE MONONITRATE 60 MG: 30 TABLET, EXTENDED RELEASE ORAL at 08:09

## 2024-09-25 RX ADMIN — SUCRALFATE 1 G: 1 SUSPENSION ORAL at 06:09

## 2024-09-25 RX ADMIN — FLUOXETINE HYDROCHLORIDE 40 MG: 20 CAPSULE ORAL at 08:09

## 2024-09-25 RX ADMIN — FUROSEMIDE 20 MG: 10 INJECTION, SOLUTION INTRAVENOUS at 05:09

## 2024-09-25 RX ADMIN — INSULIN GLARGINE 19 UNITS: 100 INJECTION, SOLUTION SUBCUTANEOUS at 09:09

## 2024-09-25 RX ADMIN — FUROSEMIDE 40 MG: 10 INJECTION, SOLUTION INTRAVENOUS at 09:09

## 2024-09-25 RX ADMIN — CEFTRIAXONE 1 G: 1 INJECTION, POWDER, FOR SOLUTION INTRAMUSCULAR; INTRAVENOUS at 09:09

## 2024-09-25 NOTE — PLAN OF CARE
Problem: Adult Inpatient Plan of Care  Goal: Plan of Care Review  Outcome: Progressing     Problem: Adult Inpatient Plan of Care  Goal: Absence of Hospital-Acquired Illness or Injury  Outcome: Progressing     Problem: Adult Inpatient Plan of Care  Goal: Optimal Comfort and Wellbeing  Outcome: Progressing     Problem: Infection  Goal: Absence of Infection Signs and Symptoms  Outcome: Progressing     Problem: Diabetes Comorbidity  Goal: Blood Glucose Level Within Targeted Range  Outcome: Progressing     Problem: Wound  Goal: Optimal Coping  Outcome: Progressing     Problem: Wound  Goal: Optimal Wound Healing  Outcome: Progressing     Problem: Skin Injury Risk Increased  Goal: Skin Health and Integrity  Outcome: Progressing     Problem: Fall Injury Risk  Goal: Absence of Fall and Fall-Related Injury  Outcome: Progressing   Patient lying supine in bed. NAD noted. Safety measures in place.

## 2024-09-25 NOTE — ASSESSMENT & PLAN NOTE
- last echo in 2023 with G1 LV DD  - appear mildly overloaded on exam with 2+ pitting edema to LLE. Congestive changes on CXR  - BNP pending   - will give small dose of IV lasix   - daily weights, strict I/O  - monitor tele

## 2024-09-25 NOTE — SUBJECTIVE & OBJECTIVE
Interval History: Patient admitted overnight with surgical wound dehiscence of right ankle incision. Patient was admitted to [facility] in 8/2024 after a fall with closed right pilon fracture and closed right acetabular fracture. Patient had undergone ORIF of right pilon fracture on 8/14 and then had ORIF of right acetabulum on 8/16 by Dr. Davalos. Patient was discharged to [facility] for PT/OT and recovery for her surgeries and had been there until this past Monday, 9/23 when she was discharged to back home. She reports she was getting into car to go home and not sure if she put weight on ankle or not as supposed to be non weight bearing but by time she got home she noted right ankle incision had opened up and there was bloody discharge. Patient came to ED yesterday for evaluation and admitted for surgical wound dehiscence. Patient denies any pain to her right ankle or right hip area. Orthopedics consulted and plan to take to OR today for I+D of right ankle surgical site. Labs reviewed. Patient anemic on admit and Hgb 7.9 yesterday and down to 7.4 today. Patient with positive U/A on admit consistent with UTI with > 100 WBC and many bacteria and started on IV Ceftriaxone to treat UTI and urine culture sent. Creatinine 2.0 on admit and at functional baseline as has known CKD 3b and baseline Creatinine 1.7-2. Sodium improved to 134 today from 132 yesterday. AST and ALT elevated on admit. AST 92 and  with normal T. Jc 0.4 and elevated . Patient states she has been taking a lot of Tylenol for pain lately so will hold off on further Tylenol and monitor.     Review of Systems   Constitutional:  Negative for chills and fever.   Respiratory:  Negative for cough and shortness of breath.    Cardiovascular:  Negative for chest pain, palpitations and leg swelling.   Gastrointestinal:  Negative for abdominal pain, nausea and vomiting.   Genitourinary:  Negative for difficulty urinating.   Musculoskeletal:   Negative for arthralgias, joint swelling and myalgias.   Skin:  Positive for wound (Right ankle surgical wound).   Neurological:  Positive for weakness (Generalized). Negative for dizziness and light-headedness.   Psychiatric/Behavioral:  Negative for confusion and hallucinations.      Objective:     Vital Signs (Most Recent):  Temp: 97.8 °F (36.6 °C) (09/25/24 0731)  Pulse: 97 (09/25/24 1122)  Resp: 18 (09/25/24 0731)  BP: 157/69 (09/25/24 0731)  SpO2: 93 % (09/25/24 0731) on room air Vital Signs (24h Range):  Temp:  [97.8 °F (36.6 °C)-99.6 °F (37.6 °C)] 97.8 °F (36.6 °C)  Pulse:  [73-99] 97  Resp:  [18] 18  SpO2:  [93 %-99 %] 93 %  BP: (124-165)/(65-72) 157/69     Weight: 85.4 kg (188 lb 4.4 oz)  Body mass index is 27.01 kg/m².  No intake or output data in the 24 hours ending 09/25/24 1138      Physical Exam  Vitals and nursing note reviewed.   Constitutional:       General: She is awake. She is not in acute distress.     Appearance: Normal appearance. She is normal weight. She is not ill-appearing.      Comments: Patient sitting up in bed in no distress and very comfortable. Patient awake and alert and oriented x 4.    Eyes:      Conjunctiva/sclera: Conjunctivae normal.   Cardiovascular:      Rate and Rhythm: Normal rate and regular rhythm.      Heart sounds: Normal heart sounds. No murmur heard.     No friction rub. No gallop.   Pulmonary:      Effort: Pulmonary effort is normal. No respiratory distress.      Breath sounds: Normal breath sounds. No wheezing.   Abdominal:      General: Abdomen is flat. Bowel sounds are normal. There is no distension.      Palpations: Abdomen is soft.      Tenderness: There is no abdominal tenderness.   Musculoskeletal:      Right lower leg: No edema.      Left lower leg: No edema.      Comments: Right ankle wrapped in ACE bandage.    Skin:     General: Skin is warm.      Findings: No erythema.      Comments: Bluish coloration to groin areas related to application of Gentian  violet   Neurological:      Mental Status: She is alert and oriented to person, place, and time.   Psychiatric:         Mood and Affect: Mood normal.         Behavior: Behavior normal. Behavior is cooperative.         Thought Content: Thought content normal.         Judgment: Judgment normal.           Significant Labs: A1C:   Recent Labs   Lab 07/10/24  1105   HGBA1C 8.2*     CBC:   Recent Labs   Lab 09/24/24  1622 09/25/24  0248   WBC 9.42 9.03   HGB 7.9* 7.4*   HCT 25.9* 24.1*    210     CMP:   Recent Labs   Lab 09/24/24  1622 09/25/24  0248   * 134*   K 4.5 4.2    104   CO2 22* 21*   * 126*   BUN 36* 35*   CREATININE 2.0* 2.0*   CALCIUM 8.4* 8.9   PROT 6.0 5.9*   ALBUMIN 2.2* 2.2*   BILITOT 0.4 0.4   ALKPHOS 476* 418*   * 92*   * 122*   ANIONGAP 8 9     Cardiac Markers:   Recent Labs   Lab 09/25/24  0248   BNP 1,373*       Magnesium:   Recent Labs   Lab 09/25/24  0248   MG 1.7     POCT Glucose:   Recent Labs   Lab 09/24/24  2344 09/25/24  0743   POCTGLUCOSE 91 186*     Urine Studies:   Recent Labs   Lab 09/25/24  0028   COLORU Straw   APPEARANCEUA Cloudy*   PHUR 6.0   SPECGRAV 1.010   PROTEINUA 1+*   GLUCUA Negative   KETONESU Negative   BILIRUBINUA Negative   OCCULTUA 1+*   NITRITE Negative   LEUKOCYTESUR 1+*   RBCUA 6*   WBCUA >100*   BACTERIA Many*   HYALINECASTS 0       Significant Imaging: I have reviewed all pertinent imaging results/findings within the past 24 hours.

## 2024-09-25 NOTE — ASSESSMENT & PLAN NOTE
Acute blood loss anemia  Anemia is likely due to Iron deficiency and acute blood loss from right ankle. Patient with baseline iron deficiency anemia with baseline Hgb 9-10. Hgb 7.9 on admit and patient reports blood loss from ankle at home when wound dehisced causing worsening anemia. Hgb 7.9 on admit and down to 7.4 on 9/25. Patient typed and crossed and blood consent obtained and will need blood transfusion in OR today of 1 unit of PRBCs. Most recent hemoglobin and hematocrit are listed below.  Recent Labs     09/24/24  1622 09/25/24  0248   HGB 7.9* 7.4*   HCT 25.9* 24.1*       Plan  - Monitor serial CBC: Daily  - Transfuse PRBC if patient becomes hemodynamically unstable, symptomatic or H/H drops below 7/21.  - Patient has not received any PRBC transfusions to date

## 2024-09-25 NOTE — HOSPITAL COURSE
Orthopedics consulted and patient taken to OR on 9/25/2024 and underwent excisional debridement of left ankle medial wound including skin, subcutaneous tissue, fascia, bone and fasciocutaneous flap right medial ankle. Bone and tissue biopsies taken. No purulence noted. Surgical wound cultures sent. Prevena wound vac placed in OR to right ankle incision site. Patient placed on empiric IV Daptomycin and Ceftriaxone pending culture results. On admission patient was having a lot of watery type diarrhea and placed on Imodium and Lomotil but diarrhea continued so C. Difficile sent and returned back positive and patient placed on oral Vancomycin to treat C. Diff infection and place don special contact precautions. Infectious disease consulted about right ankle dehiscence and C. Diff. At first plan was for long term IV antibiotics on discharge but after discussion with Orthopedics plan changed and new plan was oral antibiotics on discharge. Patient placed on po Doxycyline 100 mg po BID on 10/2 and Daptomycin and Ceftriaxone discontinued by ID on 10/2. PT/OT consulted post-op. Therapy recommending moderate intensity. Discussion with patient and family made as patient out of SNF days and after discussion plan is  PT/OT on discharge back home with family and sitters. Per Ortho, patient to remain non weight bearing to right lower extremity at least until 11/1/2024. Plan is Prevena wound vac on discharge for 1 week and then return to Ortho clinic for wound check and vac removal as outpatient. Patient discharged with Prevena wound vac in place to right ankle incision. Patient placed on Apixiban after surgery for DVT prophylaxis and to continue until 10/26 as per Ortho on discharge. Patient still having watery diarrhea on 10/2. Patient still having loose stools on 10/3 but slowly improving on oral Vancomycin to treat her C. Diff. Patient doing well from ankle prospective and discharged home with family on 10/3 in good condition  with HH PT/OT and nursing. Patient to continue po Doxycyline for her right ankle and oral Vancomycin to treat her C. Diff on discharge. Recommended probiotics on discharge to help with her C. Diff and re colonization of her gut ozzy. Pain controlled to right ankle on discharge. Wound cultures from right ankle at time of discharge were no growth. Patient to follow-up with Orthopedics and Infectious Disease on discharge.

## 2024-09-25 NOTE — ASSESSMENT & PLAN NOTE
Patient with known CAD s/p stent placement, which is controlled Monitor for S/Sx of angina/ACS. Continue to monitor on telemetry.   - HOLD eliquis, ASA and brilinta until after I&D

## 2024-09-25 NOTE — ASSESSMENT & PLAN NOTE
Creatine stable and at baseline BMP reviewed- noted Estimated Creatinine Clearance: 29.8 mL/min (A) (based on SCr of 2 mg/dL (H)). according to latest data. Based on current GFR, CKD stage is stage 3b. Baseline Cr 1.7-2.0. Monitor UOP and serial BMP and adjust therapy as needed. Renally dose meds. Avoid nephrotoxic medications and procedures.

## 2024-09-25 NOTE — ASSESSMENT & PLAN NOTE
- Cr 2.0 on admit, baseline 1.8  - renally dose meds  - avoid nephrotoxins  - monitor with daily bmp

## 2024-09-25 NOTE — ASSESSMENT & PLAN NOTE
Closed right pilon fracture s/p ORIF on 8/14/2024  72 yo female with history of a fall resulting in a pilon fracture to the right ankle on 8/14/24 s/p ORIF. Sutures removed on 09/12/2024 in Ortho clinic. Presented to hospital for swelling and wound dehiscence to right ankle. No leukocytosis. CRP 9.3 and ESR 49. X-ray of right ankle on admit showed Orthopedic hardware intact with no acute fracture or soft tissue swelling.    - Orthopedics consulted in ED:       - Wound irrigated and soft dressings applied in ED.  - Patient non weight bearing to right lower extremity as per Ortho.   - Multimodal pain regimen limiting narcotics with scheduled Robaxin. Avoid Tylenol as AST and ALT elevated. Oxycodone IR po prn for breakthrough pain.   - Plan for irrigation and debridement of right ankle wound in OR today (9/25/24) with Dr. Davalos and Orthopedics.  - NPO at this time.   - Antibiotics for right ankle deferred until after I&D per Ortho recs.   - Hold home Apixiban, ASA and Brilinta for surgery.

## 2024-09-25 NOTE — CONSULTS
[facility] - Emergency Dept  Orthopedics  Consult Note    Patient Name: Lorena Contreras  MRN: [id number]  Admission Date: 9/24/2024  Hospital Length of Stay: 0 days  Attending Provider: Daniela Abernathy MD  Primary Care Provider: Jorge Paris MD    Patient information was obtained from patient and ER records.     Inpatient consult to Orthopedic Surgery  Consult performed by: Melita Sargent MD  Consult ordered by: Rey Cash MD        Subjective:     Principal Problem:Wound dehiscence    Chief Complaint:   Chief Complaint   Patient presents with    Post-op Problem     Arrived via  ems from home with c/o wound check to  surgical site, site opened today, noted bleeding from site, dressed prior to EMS arrival        HPI: Lorena Contreras is a 73 y.o. female with PMH significant for DM (last A1c 8.2), CHF, CKD, PVD, CAD, MI, fibromyalgia, GERD, CVA,  s/p ORIF right ankle pilon fracture (8/14) and ORIF right acetabulum (8/16), presenting to the ED with right medial ankle wound dehiscence. She reports she bumped her right ankle surgical wound yesterday (9/22) around 2:00 PM while getting into her car. Patient states they experienced immediate pain and opening of the right ankle surgical wound, and bloody fluid draining from the wound. She reports prior to this, her wound was healing well, and it had been examined at her previous follow up appointments in Ortho Trauma clinic (sutures removed 9/13).  She was NWB to E in Podus boot prior to this.  Endorses increased swelling of both of her feet the past few days. Patient denies numbness and tingling. Denies any other musculoskeletal pain or injuries. Denies fevers, chills. Walks w/out assisted devices at baseline. Doesn't take any anticoagulation at baseline.     Past Medical History:   Diagnosis Date    Allergy     Altered mental status 06/19/2022    DYSARTHRIA, SPASTIC MOVEMENTS & DIFFICULTY SWALLOWING    Anemia     Anxiety     Arthritis      Cataract     both removed    Colon polyps     Coronary artery disease     Depression     Diabetes mellitus, type II     Disorder of kidney and ureter     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/2023    Fibromyalgia     Follicular lymphoma     GERD (gastroesophageal reflux disease)     HTN (hypertension)     Hyperlipidemia     MI (myocardial infarction) 03/2019    Personal history of colonic polyps     Restless leg syndrome     Stroke        Past Surgical History:   Procedure Laterality Date    COLONOSCOPY  11/07/2012    Colon polyp found; repeat in 5 years    ELBOW SURGERY Right 2015    dislocation repair     ESOPHAGOGASTRODUODENOSCOPY  11/07/2012    atrophic gastritis, H pylori testing negative    INCISION AND DRAINAGE FOOT Right 6/2/2021    Procedure: INCISION AND DRAINAGE, FOOT, bone biopsy;  Surgeon: Quiana Penn DPM;  Location: [facility] OR;  Service: Podiatry;  Laterality: Right;    KNEE SURGERY Bilateral 2015    scoped    LEFT HEART CATHETERIZATION Left 3/29/2019    Procedure: Left heart cath;  Surgeon: Bladimir Barbosa MD;  Location: [facility] CATH LAB;  Service: Cardiology;  Laterality: Left;    LEFT HEART CATHETERIZATION Left 11/18/2019    Procedure: Left heart cath;  Surgeon: Karl Rico MD;  Location: [facility] CATH LAB;  Service: Cardiology;  Laterality: Left;    LEFT HEART CATHETERIZATION Left 1/8/2020    Procedure: Left heart cath, right radial, noon start;  Surgeon: Christos Monreal MD;  Location: [facility] CATH LAB;  Service: Cardiology;  Laterality: Left;  RN Pre Op 1-6-20.  To be admitted 1-7-20 sor Aspirin Disensitation    OPEN REDUCTION AND INTERNAL FIXATION (ORIF) OF FRACTURE OF ACETABULUM Right 8/16/2024    Procedure: ORIF, FRACTURE, ACETABULUM;  Surgeon: Neto Davalos MD;  Location: [address];  Service: Orthopedics;  Laterality: Right;  anterior and lateral pelvic incisions    OPEN REDUCTION AND INTERNAL FIXATION (ORIF) OF PILON FRACTURE Right 8/14/2024    Procedure: ORIF, FRACTURE, PILON;   Surgeon: Neto Davalos MD;  Location: [facility] OR [address] [place] [address];  Service: Orthopedics;  Laterality: Right;    TONSILLECTOMY  1955    ULTRASOUND GUIDANCE  1/8/2020    Procedure: Ultrasound Guidance;  Surgeon: Christos Monreal MD;  Location: [facility] CATH LAB;  Service: Cardiology;;       Review of patient's allergies indicates:   Allergen Reactions    Novolin 70/30 (semi-synthetic) Nausea And Vomiting     Severe vomiting on Relion 70/30    Sulfa (sulfonamide antibiotics) Anaphylaxis    Talwin [pentazocine lactate] Anaphylaxis    Victoza [liraglutide] Nausea And Vomiting    Glipizide Nausea Only    Citric acid     Codeine     Influenza virus vaccines Hives    Iodine and iodide containing products Hives    Levetiracetam Itching    Neurontin [gabapentin]      Possible associated myoclonic jerk    Rituxan [rituximab] Hives    Zoloft [sertraline] Nausea And Vomiting       No current facility-administered medications for this encounter.     Current Outpatient Medications   Medication Sig    acetaminophen (TYLENOL) 500 MG tablet Take 1 tablet (500 mg total) by mouth every 8 (eight) hours.    albuterol (PROVENTIL/VENTOLIN HFA) 90 mcg/actuation inhaler Inhale 2 puffs into the lungs every 6 (six) hours as needed for Wheezing or Shortness of Breath.    aluminum & magnesium hydroxide-simethicone (MYLANTA MAX STRENGTH) 400-400-40 mg/5 mL suspension Take 30 mLs by mouth every 6 (six) hours as needed for Indigestion.    amoxicillin-clavulanate 875-125mg (AUGMENTIN) 875-125 mg per tablet Take 1 tablet by mouth 2 (two) times daily.    apixaban (ELIQUIS) 2.5 mg Tab Take 1 tablet (2.5 mg total) by mouth 2 (two) times daily. End date October 26th 2024    aspirin 81 MG Chew Take 1 tablet (81 mg total) by mouth once daily. Hold to avoid bleed risk on triple therapy and then resume on oct 27 once eliquis stops on oct 26th    atorvastatin (LIPITOR) 80 MG tablet Take 1 tablet by mouth in the evening    calcium carbonate (CALCIUM ANTACID) 300  mg (750 mg) Chew Take 1 750 mg tablet daily    DEXCOM G7  Misc Use 1  to track blood glucose, ICD10: E11.65    DEXCOM G7 SENSOR Samina Use 1 sensor every 10 days to track blood glucose, ICD10: E11.65, okay with 90 day supply if possible    divalproex (DEPAKOTE SPRINKLES) 125 mg capsule Take 2 capsules (250 mg total) by mouth every 8 (eight) hours. Per psych MD can stop while at [facility] if doing well without any altered mental status while there but continue on discharge from hospital for now    FLUoxetine 40 MG capsule Take 1 capsule (40 mg total) by mouth once daily.    hydrOXYzine HCL (ATARAX) 25 MG tablet Take 1 tablet (25 mg total) by mouth 3 (three) times daily as needed for Itching.    insulin aspart U-100 (NOVOLOG) 100 unit/mL (3 mL) InPn pen Inject 0-10 Units into the skin before meals and at bedtime as needed (Hyperglycemia).    insulin glargine U-100, Lantus, 100 unit/mL (3 mL) SubQ InPn pen Inject 19 Units into the skin every evening.    isosorbide mononitrate (IMDUR) 60 MG 24 hr tablet Take 1 tablet (60 mg total) by mouth once daily.    melatonin (MELATIN) 3 mg tablet Take 2 tablets (6 mg total) by mouth nightly.    methocarbamoL (ROBAXIN) 500 MG Tab Take 1 tablet (500 mg total) by mouth 4 (four) times daily.    metoprolol succinate (TOPROL-XL) 25 MG 24 hr tablet Take 1 tablet (25 mg total) by mouth once daily.    ondansetron (ZOFRAN-ODT) 8 MG TbDL Take 1 tablet (8 mg total) by mouth every 8 (eight) hours as needed (nausea).    oxyCODONE (ROXICODONE) 10 mg Tab immediate release tablet Take 1 tablet (10 mg total) by mouth every 6 (six) hours as needed (pain scale 7-10).    oxyCODONE (ROXICODONE) 5 MG immediate release tablet Take 1 tablet (5 mg total) by mouth every 6 (six) hours as needed (pain scale 4-6).    OZEMPIC 1 mg/dose (4 mg/3 mL) Inject 1 mg into the skin every 7 days. HOLD while at [facility]    pantoprazole (PROTONIX) 40 MG tablet Take 1 tablet (40 mg total) by mouth once daily.    pen  "needle, diabetic (BD ULTRA-FINE SAGAR PEN NEEDLE) 32 gauge x 5/32" Ndle One pen needle use with insulin pen 4 times a day.  ICD-10: E11.9    polyethylene glycol (GLYCOLAX) 17 gram PwPk Take 17 g by mouth daily as needed for Constipation.    QUEtiapine (SEROQUEL) 50 MG tablet Take 1 tablet (50 mg total) by mouth every evening.    semaglutide (OZEMPIC) 0.25 mg or 0.5 mg (2 mg/3 mL) pen injector Inject 0.5 mg once weekly thereafter    HOLD at [facility]    ticagrelor (BRILINTA) 90 mg tablet Take 1 tablet (90 mg total) by mouth 2 (two) times daily.     Family History       Problem Relation (Age of Onset)    Allergy (severe) Daughter    Cancer Mother, Father    Diabetes Sister    Heart disease Mother    Hypertension Sister    Lung cancer Brother    No Known Problems Daughter          Tobacco Use    Smoking status: Never    Smokeless tobacco: Never   Substance and Sexual Activity    Alcohol use: Not Currently    Drug use: Never    Sexual activity: Not Currently     Partners: Male     ROS  Constitutional: negative for fevers or chills  Eyes: negative visual changes or eye discharge  ENT: negative for ear pain or sore throat  Respiratory: negative for shortness of breath or cough  Cardiovascular: negative for chest pain or palpitations  Gastrointestinal: negative for abdominal pain, nausea, or vomiting  Genitourinary: negative for dysuria and flank pain  Neurological: negative for headaches or dizziness  Musculoskeletal: see HPI    Objective:     Vital Signs (Most Recent):  Temp: 99.6 °F (37.6 °C) (09/24/24 1804)  Pulse: 95 (09/24/24 1804)  Resp: 18 (09/24/24 1538)  BP: 124/71 (09/24/24 1804)  SpO2: 96 % (09/24/24 1804) Vital Signs (24h Range):  Temp:  [98.1 °F (36.7 °C)-99.6 °F (37.6 °C)] 99.6 °F (37.6 °C)  Pulse:  [95-99] 95  Resp:  [18] 18  SpO2:  [93 %-96 %] 96 %  BP: (124-142)/(71-72) 124/71     Weight: 83.5 kg (184 lb)  Height: 5' 9" (175.3 cm)  Body mass index is 27.17 kg/m².    No intake or output data in the 24 hours " ending 09/24/24 2035     Ortho/SPM Exam  Physical Exam:  General:  no acute distress, WDWN  HENT:  NCAT, Bilateral ears/eyes normal  CV:  Normal pulses, color, and cap refill  Lungs:  Normal respiratory effort  Neuro: No FND, awake, alert  Psych:  Oriented to Person, Place, Time, and Situation     MSK:       BUE:  Inspection: Skin intact throughout, no swelling, no effusions, no ecchymosis   Palpation: Non-TTP throughout, no palpable abnormality.   ROM: AROM and PROM of the shoulder, elbow, wrist, and hand intact without pain.   Neuro: AIN/PIN/Radial/Median/Ulnar Nerves assessed in isolation without deficit.   SILT throughout.    Vascular: Radial artery palpated 2+. Capillary refill <3s.         LLE:  Inspection: Skin intact throughout, no swelling, no effusions, no ecchymosis   Palpation: Non-TTP throughout. No palpable abnormality.   ROM: AROM and PROM of the hip, knee, ankle, and foot intact without pain.   Neuro: TA/EHL/Gastroc/FHL assessed in isolation without deficit.   SILT throughout.    Vascular: Foot is WWP. Capillary refill <3s.         RLE:  Inspection: Medial ankle with dehisced surgical wound and visible medial hardware  Light serosanguinous drainage from wound   Mild swelling around medial ankle  No surrounding erythema or signs of cellulitis  No purulent drainage    Palpation: TTP at ankle; otherwise non-TTP throughout.  No palpable abnormality.   Compartments soft and compressible.   ROM: AROM and PROM of the hip, knee, and toes intact without pain.  AROM and PROM of ankle and foot limited 2/2 pain  No evidence of joint dislocation or abnormal laxity.   Neuro: TA/Gastroc/EHL/FHL assessed in isolation without deficit.   SILT Sa/Welsh/DP/SP/T nerve distributions   Vascular: DP and PT arteries palpated 1+. Capillary refill <3s.             Significant Labs: All pertinent labs within the past 24 hours have been reviewed.    Significant Imaging: I have reviewed and interpreted all pertinent imaging  results/findings.  X-ray right ankle showing stable orthopaedic hardware with no evidence of loosening or failure.   Assessment/Plan:     * Wound dehiscence, right ankle  Lorena Contreras is a 73 y.o. female s/p ORIF right pilon fx presents with right medial ankle wound dehiscence.  Presented afebrile with labs significant for WBC 9.42, ESR  , CRP 9.3.  On physical exam, medial ankle surgical wound noted to be open and draining serosanguinous fluid. Incision was irrigated with 4 L of normal saline. Sterile saline soaked gauze and soft dressings were applied to wound.  Patient's extremity was elevated.     Plan:  - Admit to    - Abx: hold IV abx  - Wound irrigated and soft dressings applied in ED (see procedure note below)  - NWB RLE  - Multimodal pain regimen limiting narcotics  - Plan for irrigation and debridement of right ankle wound in OR today (9/25/24)  - Consents signed. Right ankle is marked.  - NPO midnight           Melita Sargent MD  Orthopedics  [facility] - Emergency Dept

## 2024-09-25 NOTE — NURSING
Nurses Note -- 4 Eyes      9/25/2024   11:58 AM      Skin assessed during: Daily Assessment      [] No Altered Skin Integrity Present    []Prevention Measures Documented      [x] Yes- Altered Skin Integrity Present or Discovered   [] LDA Added if Not in Epic (Describe Wound)   [] New Altered Skin Integrity was Present on Admit and Documented in LDA   [] Wound Image Taken    Wound Care Consulted? Yes    Attending Nurse:  Heidi Shannon RN/Staff Member:   DIONNA Skinner

## 2024-09-25 NOTE — HPI
Lorena Contreras is a 73 y.o. female with PMH significant for DM (last A1c 8.2), CHF, CKD, PVD, CAD, MI, fibromyalgia, GERD, CVA,  s/p ORIF right ankle pilon fracture (8/14) and ORIF right acetabulum (8/16), presenting to the ED with right medial ankle wound dehiscence. She reports she bumped her right ankle surgical wound yesterday (9/22) around 2:00 PM while getting into her car. Patient states they experienced immediate pain and opening of the right ankle surgical wound, and bloody fluid draining from the wound. She reports prior to this, her wound was healing well, and it had been examined at her previous follow up appointments in Ortho Trauma clinic (sutures removed 9/13).  She was NWB to RLE in Podus boot prior to this.  Endorses increased swelling of both of her feet the past few days. Patient denies numbness and tingling. Denies any other musculoskeletal pain or injuries. Denies fevers, chills. Walks w/out assisted devices at baseline. Doesn't take any anticoagulation at baseline.

## 2024-09-25 NOTE — ASSESSMENT & PLAN NOTE
72 yo F with hx of a fall resulting in a pilon fracture to the right ankle on 8/14/24 s/p ORIF. Sutures removed on 09/12. Presents today for swelling and wound dehiscence. No leukocytosis. CRP 9.3 and ESR 49. R XR ankle concerning for possible cellulitis.     -Pain management: Oxycodone 5 mg q4h PRN and robaxin PRN  -Orthopedics consulted:        - Wound irrigated and soft dressings applied in ED (see procedure note below)  - NWB RLE  - Multimodal pain regimen limiting narcotics  - Plan for irrigation and debridement of right ankle wound in OR today (9/25/24)  - Consents signed. Right ankle is marked.  - NPO midnight   - antibiotic deferred until after I&D per ortho recs  - Hold eliquis, ASA and brilinta-- restart after I&D

## 2024-09-25 NOTE — ASSESSMENT & PLAN NOTE
Lorena Contreras is a 73 y.o. female s/p ORIF right pilon fx presents with right medial ankle wound dehiscence.  Presented afebrile with labs significant for WBC 9.42, ESR  , CRP 9.3.  On physical exam, medial ankle surgical wound noted to be open and draining serosanguinous fluid. Incision was irrigated with 4 L of normal saline. Sterile saline soaked gauze and soft dressings were applied to wound.  Patient's extremity was elevated.     Plan:  - Admit to    - Abx: hold IV abx  - Wound irrigated and soft dressings applied in ED (see procedure note below)  - NWB RLE  - Multimodal pain regimen limiting narcotics  - Plan for irrigation and debridement of right ankle wound in OR today (9/25/24)  - Consents signed. Right ankle is marked.  - NPO midnight

## 2024-09-25 NOTE — SUBJECTIVE & OBJECTIVE
Past Medical History:   Diagnosis Date    Allergy     Altered mental status 06/19/2022    DYSARTHRIA, SPASTIC MOVEMENTS & DIFFICULTY SWALLOWING    Anemia     Anxiety     Arthritis     Cataract     both removed    Colon polyps     Coronary artery disease     Depression     Diabetes mellitus, type II     Disorder of kidney and ureter     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/2023    Fibromyalgia     Follicular lymphoma     GERD (gastroesophageal reflux disease)     HTN (hypertension)     Hyperlipidemia     MI (myocardial infarction) 03/2019    Personal history of colonic polyps     Restless leg syndrome     Stroke        Past Surgical History:   Procedure Laterality Date    COLONOSCOPY  11/07/2012    Colon polyp found; repeat in 5 years    ELBOW SURGERY Right 2015    dislocation repair     ESOPHAGOGASTRODUODENOSCOPY  11/07/2012    atrophic gastritis, H pylori testing negative    INCISION AND DRAINAGE FOOT Right 6/2/2021    Procedure: INCISION AND DRAINAGE, FOOT, bone biopsy;  Surgeon: Quiana Penn DPM;  Location: [facility] OR;  Service: Podiatry;  Laterality: Right;    KNEE SURGERY Bilateral 2015    scoped    LEFT HEART CATHETERIZATION Left 3/29/2019    Procedure: Left heart cath;  Surgeon: Bladimir Barbosa MD;  Location: [facility] CATH LAB;  Service: Cardiology;  Laterality: Left;    LEFT HEART CATHETERIZATION Left 11/18/2019    Procedure: Left heart cath;  Surgeon: Karl Rico MD;  Location: [facility] CATH LAB;  Service: Cardiology;  Laterality: Left;    LEFT HEART CATHETERIZATION Left 1/8/2020    Procedure: Left heart cath, right radial, noon start;  Surgeon: Christos Monreal MD;  Location: [facility] CATH LAB;  Service: Cardiology;  Laterality: Left;  RN Pre Op 1-6-20.  To be admitted 1-7-20 sor Aspirin Disensitation    OPEN REDUCTION AND INTERNAL FIXATION (ORIF) OF FRACTURE OF ACETABULUM Right 8/16/2024    Procedure: ORIF, FRACTURE, ACETABULUM;  Surgeon: Neto Davalos MD;  Location: [address];  Service:  Orthopedics;  Laterality: Right;  anterior and lateral pelvic incisions    OPEN REDUCTION AND INTERNAL FIXATION (ORIF) OF PILON FRACTURE Right 8/14/2024    Procedure: ORIF, FRACTURE, PILON;  Surgeon: Neto Davalos MD;  Location: [facility] OR [address] [place] [address];  Service: Orthopedics;  Laterality: Right;    TONSILLECTOMY  1955    ULTRASOUND GUIDANCE  1/8/2020    Procedure: Ultrasound Guidance;  Surgeon: Chirstos Monreal MD;  Location: [facility] CATH LAB;  Service: Cardiology;;       Review of patient's allergies indicates:   Allergen Reactions    Novolin 70/30 (semi-synthetic) Nausea And Vomiting     Severe vomiting on Relion 70/30    Sulfa (sulfonamide antibiotics) Anaphylaxis    Talwin [pentazocine lactate] Anaphylaxis    Victoza [liraglutide] Nausea And Vomiting    Glipizide Nausea Only    Citric acid     Codeine     Influenza virus vaccines Hives    Iodine and iodide containing products Hives    Levetiracetam Itching    Neurontin [gabapentin]      Possible associated myoclonic jerk    Rituxan [rituximab] Hives    Zoloft [sertraline] Nausea And Vomiting       No current facility-administered medications on file prior to encounter.     Current Outpatient Medications on File Prior to Encounter   Medication Sig    acetaminophen (TYLENOL) 500 MG tablet Take 1 tablet (500 mg total) by mouth every 8 (eight) hours.    albuterol (PROVENTIL/VENTOLIN HFA) 90 mcg/actuation inhaler Inhale 2 puffs into the lungs every 6 (six) hours as needed for Wheezing or Shortness of Breath.    aluminum & magnesium hydroxide-simethicone (MYLANTA MAX STRENGTH) 400-400-40 mg/5 mL suspension Take 30 mLs by mouth every 6 (six) hours as needed for Indigestion.    amoxicillin-clavulanate 875-125mg (AUGMENTIN) 875-125 mg per tablet Take 1 tablet by mouth 2 (two) times daily.    apixaban (ELIQUIS) 2.5 mg Tab Take 1 tablet (2.5 mg total) by mouth 2 (two) times daily. End date October 26th 2024    aspirin 81 MG Chew Take 1 tablet (81 mg total) by mouth once  daily. Hold to avoid bleed risk on triple therapy and then resume on oct 27 once eliquis stops on oct 26th    atorvastatin (LIPITOR) 80 MG tablet Take 1 tablet by mouth in the evening    calcium carbonate (CALCIUM ANTACID) 300 mg (750 mg) Chew Take 1 750 mg tablet daily    DEXCOM G7  Misc Use 1  to track blood glucose, ICD10: E11.65    DEXCOM G7 SENSOR Samina Use 1 sensor every 10 days to track blood glucose, ICD10: E11.65, okay with 90 day supply if possible    divalproex (DEPAKOTE SPRINKLES) 125 mg capsule Take 2 capsules (250 mg total) by mouth every 8 (eight) hours. Per psych MD can stop while at [facility] if doing well without any altered mental status while there but continue on discharge from hospital for now    FLUoxetine 40 MG capsule Take 1 capsule (40 mg total) by mouth once daily.    hydrOXYzine HCL (ATARAX) 25 MG tablet Take 1 tablet (25 mg total) by mouth 3 (three) times daily as needed for Itching.    insulin aspart U-100 (NOVOLOG) 100 unit/mL (3 mL) InPn pen Inject 0-10 Units into the skin before meals and at bedtime as needed (Hyperglycemia).    insulin glargine U-100, Lantus, 100 unit/mL (3 mL) SubQ InPn pen Inject 19 Units into the skin every evening.    isosorbide mononitrate (IMDUR) 60 MG 24 hr tablet Take 1 tablet (60 mg total) by mouth once daily.    melatonin (MELATIN) 3 mg tablet Take 2 tablets (6 mg total) by mouth nightly.    methocarbamoL (ROBAXIN) 500 MG Tab Take 1 tablet (500 mg total) by mouth 4 (four) times daily.    metoprolol succinate (TOPROL-XL) 25 MG 24 hr tablet Take 1 tablet (25 mg total) by mouth once daily.    ondansetron (ZOFRAN-ODT) 8 MG TbDL Take 1 tablet (8 mg total) by mouth every 8 (eight) hours as needed (nausea).    oxyCODONE (ROXICODONE) 10 mg Tab immediate release tablet Take 1 tablet (10 mg total) by mouth every 6 (six) hours as needed (pain scale 7-10).    oxyCODONE (ROXICODONE) 5 MG immediate release tablet Take 1 tablet (5 mg total) by mouth every 6  "(six) hours as needed (pain scale 4-6).    OZEMPIC 1 mg/dose (4 mg/3 mL) Inject 1 mg into the skin every 7 days. HOLD while at SNF    pantoprazole (PROTONIX) 40 MG tablet Take 1 tablet (40 mg total) by mouth once daily.    pen needle, diabetic (BD ULTRA-FINE SAGAR PEN NEEDLE) 32 gauge x 5/32" Ndle One pen needle use with insulin pen 4 times a day.  ICD-10: E11.9    polyethylene glycol (GLYCOLAX) 17 gram PwPk Take 17 g by mouth daily as needed for Constipation.    QUEtiapine (SEROQUEL) 50 MG tablet Take 1 tablet (50 mg total) by mouth every evening.    semaglutide (OZEMPIC) 0.25 mg or 0.5 mg (2 mg/3 mL) pen injector Inject 0.5 mg once weekly thereafter    HOLD at [facility]    ticagrelor (BRILINTA) 90 mg tablet Take 1 tablet (90 mg total) by mouth 2 (two) times daily.     Family History       Problem Relation (Age of Onset)    Allergy (severe) Daughter    Cancer Mother, Father    Diabetes Sister    Heart disease Mother    Hypertension Sister    Lung cancer Brother    No Known Problems Daughter          Tobacco Use    Smoking status: Never    Smokeless tobacco: Never   Substance and Sexual Activity    Alcohol use: Not Currently    Drug use: Never    Sexual activity: Not Currently     Partners: Male     Review of Systems   Constitutional:  Positive for appetite change and fatigue. Negative for chills and fever.   HENT:  Negative for rhinorrhea, sore throat and trouble swallowing.    Eyes:  Negative for photophobia and pain.   Respiratory:  Positive for shortness of breath (when she is anxious). Negative for cough and chest tightness.    Cardiovascular:  Negative for chest pain, palpitations and leg swelling.   Gastrointestinal:  Positive for constipation and nausea. Negative for abdominal pain, blood in stool, diarrhea and vomiting.   Genitourinary:  Negative for dysuria, frequency and hematuria.   Musculoskeletal:  Positive for arthralgias and joint swelling. Negative for back pain, gait problem and neck pain.   Skin:  " Positive for wound. Negative for rash.   Neurological:  Positive for weakness (Generalized). Negative for dizziness, syncope, speech difficulty and light-headedness.   Psychiatric/Behavioral:  Negative for agitation and confusion. The patient is not nervous/anxious.      Objective:     Vital Signs (Most Recent):  Temp: 97.9 °F (36.6 °C) (09/24/24 2120)  Pulse: 73 (09/24/24 2120)  Resp: 18 (09/24/24 2120)  BP: (!) 149/68 (09/24/24 2120)  SpO2: 99 % (09/24/24 2120) Vital Signs (24h Range):  Temp:  [97.9 °F (36.6 °C)-99.6 °F (37.6 °C)] 97.9 °F (36.6 °C)  Pulse:  [73-99] 73  Resp:  [18] 18  SpO2:  [93 %-99 %] 99 %  BP: (124-149)/(68-72) 149/68     Weight: 83.5 kg (184 lb)  Body mass index is 27.17 kg/m².     Physical Exam  Vitals and nursing note reviewed.   Constitutional:       General: She is not in acute distress.     Appearance: She is well-developed. She is obese. She is not diaphoretic.   HENT:      Head: Normocephalic and atraumatic.      Right Ear: External ear normal.      Left Ear: External ear normal.      Nose: Nose normal.      Mouth/Throat:      Mouth: Mucous membranes are moist.      Pharynx: Oropharynx is clear. No oropharyngeal exudate.   Eyes:      Conjunctiva/sclera: Conjunctivae normal.      Pupils: Pupils are equal, round, and reactive to light.   Cardiovascular:      Rate and Rhythm: Normal rate and regular rhythm.      Heart sounds: Murmur heard.   Pulmonary:      Effort: Pulmonary effort is normal.      Breath sounds: No wheezing.      Comments: Diminished breath sounds in bilateral bases  Abdominal:      Palpations: Abdomen is soft.      Tenderness: There is no abdominal tenderness. There is no guarding.   Genitourinary:     Comments: Benoit cath in place  Musculoskeletal:         General: No tenderness.      Cervical back: Normal range of motion and neck supple.      Left lower leg: Edema (2+) present.      Comments: C/d/I dressing to R ankle. Neurovascularly intact to RLE digits.     Lymphadenopathy:      Cervical: No cervical adenopathy.   Skin:     General: Skin is warm and dry.      Capillary Refill: Capillary refill takes less than 2 seconds.      Findings: No rash.   Neurological:      Mental Status: She is alert and oriented to person, place, and time.      Cranial Nerves: No cranial nerve deficit.      Sensory: No sensory deficit.      Coordination: Coordination normal.   Psychiatric:         Behavior: Behavior normal.         Thought Content: Thought content normal.         Judgment: Judgment normal.      Comments: Appears anxious              CRANIAL NERVES     CN III, IV, VI   Pupils are equal, round, and reactive to light.       Significant Labs: All pertinent labs within the past 24 hours have been reviewed.  CBC:   Recent Labs   Lab 09/24/24  1622   WBC 9.42   HGB 7.9*   HCT 25.9*        CMP:   Recent Labs   Lab 09/24/24  1622   *   K 4.5      CO2 22*   *   BUN 36*   CREATININE 2.0*   CALCIUM 8.4*   PROT 6.0   ALBUMIN 2.2*   BILITOT 0.4   ALKPHOS 476*   *   *   ANIONGAP 8       Significant Imaging: I have reviewed all pertinent imaging results/findings within the past 24 hours.  Imaging Results              X-Ray Ankle Complete Right (Final result)  Result time 09/24/24 22:30:13      Final result by Javier Zhang MD (09/24/24 22:30:13)                   Impression:      Stable right ankle since 09/08/2024.    Correlate for cellulitis.    No evidence of fracture or dislocation.      Electronically signed by: Javier Zhang  Date:    09/24/2024  Time:    22:30               Narrative:    EXAMINATION:  XR ANKLE COMPLETE 3 VIEW RIGHT    CLINICAL HISTORY:  Wound dehiscence after pilon surgery;    TECHNIQUE:  AP, lateral, and oblique images of the right ankle were performed.    COMPARISON:  09/08/2024    FINDINGS:  Orthopedic hardware appear stable.  No acute fracture or soft tissue swelling.  Soft tissue irregularity and density in the  subcutaneous soft tissues of the heel and distal leg raise the question of cellulitis.  Is no soft tissue gas.

## 2024-09-25 NOTE — ASSESSMENT & PLAN NOTE
Chronic, controlled. Latest blood pressure and vitals reviewed-     Temp:  [97.8 °F (36.6 °C)-99.6 °F (37.6 °C)]   Pulse:  [73-99]   Resp:  [16-18]   BP: (124-194)/(65-81)   SpO2:  [91 %-99 %] .   Home meds for hypertension were reviewed and noted below.   Hypertension Medications               isosorbide mononitrate (IMDUR) 60 MG 24 hr tablet Take 1 tablet (60 mg total) by mouth once daily.    metoprolol succinate (TOPROL-XL) 25 MG 24 hr tablet Take 1 tablet (25 mg total) by mouth once daily.            While in the hospital, will manage blood pressure as follows; Continue home antihypertensive regimen to treat in hospital.    Will utilize p.r.n. blood pressure medication only if patient's blood pressure greater than 180/110 and she develops symptoms such as worsening chest pain or shortness of breath.

## 2024-09-25 NOTE — PLAN OF CARE
Routine labs, CXR and EKG reviewed. Patient is at intermediate risk for perioperative cardiopulmonary complications. Patient has no active cardiac issues. Patient is undergoing intermediate risk surgery. Patient has functional METs > or = 4. Patient with cardiac clinical risk factors of history of CAD/ischemic heart disease and Type 2 diabetes mellitus requiring insulin. Recommend to proceed to surgery as planned at intermediate cardiovascular risk with no further additional non-invasive cardiac testing required as will not alter management of this patient.      HUNTER BARROSO MD  Attending Staff Physician   Department of Hospital Medicine, [facility] on [facility]  Pager: [phone]  Spectralink: [phone]

## 2024-09-25 NOTE — ASSESSMENT & PLAN NOTE
- U/A on admit with > 100 WBC and many bacteria consistent with UTI. Urine culture sent and patient started on IV Ceftriaxone 1 gram daily to treat.   - Follow-up urine culture results.

## 2024-09-25 NOTE — H&P
"  [facility] - Emergency [facility] Medicine  History & Physical    Patient Name: Lorena Contreras  MRN: [id number]  Patient Class: OP- Observation  Admission Date: 9/24/2024  Attending Physician: Daniela Abernathy MD   Primary Care Provider: Jorge Paris MD         Patient information was obtained from patient and ER records.     Subjective:     Principal Problem:Wound dehiscence    Chief Complaint:   Chief Complaint   Patient presents with    Post-op Problem     Arrived via  ems from home with c/o wound check to  surgical site, site opened today, noted bleeding from site, dressed prior to EMS arrival        HPI: Lorena Contreras is a 72 yo F with PMHx of  DM2, Fibromyalgia, lymphoma s/p chemo, HTN, HLD, CVA, MI, CAD s/p PCIs, CKD3b, HFpEF, and anemia who presented to ED for R ankle wound dehiscence. The patient reports that on 8/14/24, she was walking into a [place] [employer] when she lost her balance, fell, and twisted her ankle. She sustained a pelvic fracture and a right pilon fracture, requiring surgery at [facility] later that day. Following the fall, she experienced urinary retention and had an indwelling catheter placed. While at the SNF, the patient reports significant progress, noting that her injuries were healing well, and she regained some mobility. Her wound was evaluated, and sutures were removed on 9/12/24. The patient was discharged home four days ago but describes her experience as "miserable," citing a lack of mobility aids provided for home use. At SNF, she had not been bearing weight on her RLE. She reports while attempting to stand on her RLE with home health her injury opened up. Since then, she has had increased swelling to her R ankle and associated generalized weakness and fatigue, decreased appetite, constipation, SOB (when anxious) and nausea. She denies fevers, chills, rhinorrhea, sore throat, cough, hemoptysis, chest pain, vomiting, diarrhea, or blood in her stool.     ED Course: " AFVSS. CBC significant hgb 7.9 (down from 11.8 on month ago). No leukocytosis. CMP with Cr 2.0 (at baseline), transaminitis , , and . BG initially 232, but now 91. CRP 93 and ESR 49. Lactate 0.91. R ankle XR with stable hardware and no acute fx or dislocation, but concerning for cellulitis. Ortho consulted and planning for I&D in AM. Given IV zofran. Placed in observation with HM.       Past Medical History:   Diagnosis Date    Allergy     Altered mental status 06/19/2022    DYSARTHRIA, SPASTIC MOVEMENTS & DIFFICULTY SWALLOWING    Anemia     Anxiety     Arthritis     Cataract     both removed    Colon polyps     Coronary artery disease     Depression     Diabetes mellitus, type II     Disorder of kidney and ureter     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/2023    Fibromyalgia     Follicular lymphoma     GERD (gastroesophageal reflux disease)     HTN (hypertension)     Hyperlipidemia     MI (myocardial infarction) 03/2019    Personal history of colonic polyps     Restless leg syndrome     Stroke        Past Surgical History:   Procedure Laterality Date    COLONOSCOPY  11/07/2012    Colon polyp found; repeat in 5 years    ELBOW SURGERY Right 2015    dislocation repair     ESOPHAGOGASTRODUODENOSCOPY  11/07/2012    atrophic gastritis, H pylori testing negative    INCISION AND DRAINAGE FOOT Right 6/2/2021    Procedure: INCISION AND DRAINAGE, FOOT, bone biopsy;  Surgeon: Quiana Penn DPM;  Location: [facility] OR;  Service: Podiatry;  Laterality: Right;    KNEE SURGERY Bilateral 2015    scoped    LEFT HEART CATHETERIZATION Left 3/29/2019    Procedure: Left heart cath;  Surgeon: Bladimir Barbosa MD;  Location: [facility] CATH LAB;  Service: Cardiology;  Laterality: Left;    LEFT HEART CATHETERIZATION Left 11/18/2019    Procedure: Left heart cath;  Surgeon: Karl Rico MD;  Location: [facility] CATH LAB;  Service: Cardiology;  Laterality: Left;    LEFT HEART CATHETERIZATION Left 1/8/2020    Procedure: Left heart  cath, right radial, noon start;  Surgeon: Christos Monreal MD;  Location: [facility] CATH LAB;  Service: Cardiology;  Laterality: Left;  RN Pre Op 1-6-20.  To be admitted 1-7-20 sor Aspirin Disensitation    OPEN REDUCTION AND INTERNAL FIXATION (ORIF) OF FRACTURE OF ACETABULUM Right 8/16/2024    Procedure: ORIF, FRACTURE, ACETABULUM;  Surgeon: Neto Davalos MD;  Location: [facility] OR [address] [place] [address];  Service: Orthopedics;  Laterality: Right;  anterior and lateral pelvic incisions    OPEN REDUCTION AND INTERNAL FIXATION (ORIF) OF PILON FRACTURE Right 8/14/2024    Procedure: ORIF, FRACTURE, PILON;  Surgeon: Neto Davalos MD;  [facility];  Service: Orthopedics;  Laterality: Right;    TONSILLECTOMY  1955    ULTRASOUND GUIDANCE  1/8/2020    Procedure: Ultrasound Guidance;  Surgeon: Christos Monreal MD;  Location: [facility] CATH LAB;  Service: Cardiology;;       Review of patient's allergies indicates:   Allergen Reactions    Novolin 70/30 (semi-synthetic) Nausea And Vomiting     Severe vomiting on Relion 70/30    Sulfa (sulfonamide antibiotics) Anaphylaxis    Talwin [pentazocine lactate] Anaphylaxis    Victoza [liraglutide] Nausea And Vomiting    Glipizide Nausea Only    Citric acid     Codeine     Influenza virus vaccines Hives    Iodine and iodide containing products Hives    Levetiracetam Itching    Neurontin [gabapentin]      Possible associated myoclonic jerk    Rituxan [rituximab] Hives    Zoloft [sertraline] Nausea And Vomiting       No current facility-administered medications on file prior to encounter.     Current Outpatient Medications on File Prior to Encounter   Medication Sig    acetaminophen (TYLENOL) 500 MG tablet Take 1 tablet (500 mg total) by mouth every 8 (eight) hours.    albuterol (PROVENTIL/VENTOLIN HFA) 90 mcg/actuation inhaler Inhale 2 puffs into the lungs every 6 (six) hours as needed for Wheezing or Shortness of Breath.    aluminum & magnesium hydroxide-simethicone (MYLANTA MAX  STRENGTH) 400-400-40 mg/5 mL suspension Take 30 mLs by mouth every 6 (six) hours as needed for Indigestion.    amoxicillin-clavulanate 875-125mg (AUGMENTIN) 875-125 mg per tablet Take 1 tablet by mouth 2 (two) times daily.    apixaban (ELIQUIS) 2.5 mg Tab Take 1 tablet (2.5 mg total) by mouth 2 (two) times daily. End date October 26th 2024    aspirin 81 MG Chew Take 1 tablet (81 mg total) by mouth once daily. Hold to avoid bleed risk on triple therapy and then resume on oct 27 once eliquis stops on oct 26th    atorvastatin (LIPITOR) 80 MG tablet Take 1 tablet by mouth in the evening    calcium carbonate (CALCIUM ANTACID) 300 mg (750 mg) Chew Take 1 750 mg tablet daily    DEXCOM G7  Misc Use 1  to track blood glucose, ICD10: E11.65    DEXCOM G7 SENSOR Samina Use 1 sensor every 10 days to track blood glucose, ICD10: E11.65, okay with 90 day supply if possible    divalproex (DEPAKOTE SPRINKLES) 125 mg capsule Take 2 capsules (250 mg total) by mouth every 8 (eight) hours. Per psych MD can stop while at SNF if doing well without any altered mental status while there but continue on discharge from hospital for now    FLUoxetine 40 MG capsule Take 1 capsule (40 mg total) by mouth once daily.    hydrOXYzine HCL (ATARAX) 25 MG tablet Take 1 tablet (25 mg total) by mouth 3 (three) times daily as needed for Itching.    insulin aspart U-100 (NOVOLOG) 100 unit/mL (3 mL) InPn pen Inject 0-10 Units into the skin before meals and at bedtime as needed (Hyperglycemia).    insulin glargine U-100, Lantus, 100 unit/mL (3 mL) SubQ InPn pen Inject 19 Units into the skin every evening.    isosorbide mononitrate (IMDUR) 60 MG 24 hr tablet Take 1 tablet (60 mg total) by mouth once daily.    melatonin (MELATIN) 3 mg tablet Take 2 tablets (6 mg total) by mouth nightly.    methocarbamoL (ROBAXIN) 500 MG Tab Take 1 tablet (500 mg total) by mouth 4 (four) times daily.    metoprolol succinate (TOPROL-XL) 25 MG 24 hr tablet Take 1  "tablet (25 mg total) by mouth once daily.    ondansetron (ZOFRAN-ODT) 8 MG TbDL Take 1 tablet (8 mg total) by mouth every 8 (eight) hours as needed (nausea).    oxyCODONE (ROXICODONE) 10 mg Tab immediate release tablet Take 1 tablet (10 mg total) by mouth every 6 (six) hours as needed (pain scale 7-10).    oxyCODONE (ROXICODONE) 5 MG immediate release tablet Take 1 tablet (5 mg total) by mouth every 6 (six) hours as needed (pain scale 4-6).    OZEMPIC 1 mg/dose (4 mg/3 mL) Inject 1 mg into the skin every 7 days. HOLD while at [facility]    pantoprazole (PROTONIX) 40 MG tablet Take 1 tablet (40 mg total) by mouth once daily.    pen needle, diabetic (BD ULTRA-FINE SAGAR PEN NEEDLE) 32 gauge x 5/32" Ndle One pen needle use with insulin pen 4 times a day.  ICD-10: E11.9    polyethylene glycol (GLYCOLAX) 17 gram PwPk Take 17 g by mouth daily as needed for Constipation.    QUEtiapine (SEROQUEL) 50 MG tablet Take 1 tablet (50 mg total) by mouth every evening.    semaglutide (OZEMPIC) 0.25 mg or 0.5 mg (2 mg/3 mL) pen injector Inject 0.5 mg once weekly thereafter    HOLD at [facility]    ticagrelor (BRILINTA) 90 mg tablet Take 1 tablet (90 mg total) by mouth 2 (two) times daily.     Family History       Problem Relation (Age of Onset)    Allergy (severe) Daughter    Cancer Mother, Father    Diabetes Sister    Heart disease Mother    Hypertension Sister    Lung cancer Brother    No Known Problems Daughter          Tobacco Use    Smoking status: Never    Smokeless tobacco: Never   Substance and Sexual Activity    Alcohol use: Not Currently    Drug use: Never    Sexual activity: Not Currently     Partners: Male     Review of Systems   Constitutional:  Positive for appetite change and fatigue. Negative for chills and fever.   HENT:  Negative for rhinorrhea, sore throat and trouble swallowing.    Eyes:  Negative for photophobia and pain.   Respiratory:  Positive for shortness of breath (when she is anxious). Negative for cough and chest " tightness.    Cardiovascular:  Negative for chest pain, palpitations and leg swelling.   Gastrointestinal:  Positive for constipation and nausea. Negative for abdominal pain, blood in stool, diarrhea and vomiting.   Genitourinary:  Negative for dysuria, frequency and hematuria.   Musculoskeletal:  Positive for arthralgias and joint swelling. Negative for back pain, gait problem and neck pain.   Skin:  Positive for wound. Negative for rash.   Neurological:  Positive for weakness (Generalized). Negative for dizziness, syncope, speech difficulty and light-headedness.   Psychiatric/Behavioral:  Negative for agitation and confusion. The patient is not nervous/anxious.      Objective:     Vital Signs (Most Recent):  Temp: 97.9 °F (36.6 °C) (09/24/24 2120)  Pulse: 73 (09/24/24 2120)  Resp: 18 (09/24/24 2120)  BP: (!) 149/68 (09/24/24 2120)  SpO2: 99 % (09/24/24 2120) Vital Signs (24h Range):  Temp:  [97.9 °F (36.6 °C)-99.6 °F (37.6 °C)] 97.9 °F (36.6 °C)  Pulse:  [73-99] 73  Resp:  [18] 18  SpO2:  [93 %-99 %] 99 %  BP: (124-149)/(68-72) 149/68     Weight: 83.5 kg (184 lb)  Body mass index is 27.17 kg/m².     Physical Exam  Vitals and nursing note reviewed.   Constitutional:       General: She is not in acute distress.     Appearance: She is well-developed. She is obese. She is not diaphoretic.   HENT:      Head: Normocephalic and atraumatic.      Right Ear: External ear normal.      Left Ear: External ear normal.      Nose: Nose normal.      Mouth/Throat:      Mouth: Mucous membranes are moist.      Pharynx: Oropharynx is clear. No oropharyngeal exudate.   Eyes:      Conjunctiva/sclera: Conjunctivae normal.      Pupils: Pupils are equal, round, and reactive to light.   Cardiovascular:      Rate and Rhythm: Normal rate and regular rhythm.      Heart sounds: Murmur heard.   Pulmonary:      Effort: Pulmonary effort is normal.      Breath sounds: No wheezing.      Comments: Diminished breath sounds in bilateral  bases  Abdominal:      Palpations: Abdomen is soft.      Tenderness: There is no abdominal tenderness. There is no guarding.   Genitourinary:     Comments: Benoit cath in place  Musculoskeletal:         General: No tenderness.      Cervical back: Normal range of motion and neck supple.      Left lower leg: Edema (2+) present.      Comments: C/d/I dressing to R ankle. Neurovascularly intact to RLE digits.    Lymphadenopathy:      Cervical: No cervical adenopathy.   Skin:     General: Skin is warm and dry.      Capillary Refill: Capillary refill takes less than 2 seconds.      Findings: No rash.   Neurological:      Mental Status: She is alert and oriented to person, place, and time.      Cranial Nerves: No cranial nerve deficit.      Sensory: No sensory deficit.      Coordination: Coordination normal.   Psychiatric:         Behavior: Behavior normal.         Thought Content: Thought content normal.         Judgment: Judgment normal.      Comments: Appears anxious              CRANIAL NERVES     CN III, IV, VI   Pupils are equal, round, and reactive to light.       Significant Labs: All pertinent labs within the past 24 hours have been reviewed.  CBC:   Recent Labs   Lab 09/24/24  1622   WBC 9.42   HGB 7.9*   HCT 25.9*        CMP:   Recent Labs   Lab 09/24/24  1622   *   K 4.5      CO2 22*   *   BUN 36*   CREATININE 2.0*   CALCIUM 8.4*   PROT 6.0   ALBUMIN 2.2*   BILITOT 0.4   ALKPHOS 476*   *   *   ANIONGAP 8       Significant Imaging: I have reviewed all pertinent imaging results/findings within the past 24 hours.  Imaging Results              X-Ray Ankle Complete Right (Final result)  Result time 09/24/24 22:30:13      Final result by Javier Zhang MD (09/24/24 22:30:13)                   Impression:      Stable right ankle since 09/08/2024.    Correlate for cellulitis.    No evidence of fracture or dislocation.      Electronically signed by: Javier  Leatha  Date:    09/24/2024  Time:    22:30               Narrative:    EXAMINATION:  XR ANKLE COMPLETE 3 VIEW RIGHT    CLINICAL HISTORY:  Wound dehiscence after pilon surgery;    TECHNIQUE:  AP, lateral, and oblique images of the right ankle were performed.    COMPARISON:  09/08/2024    FINDINGS:  Orthopedic hardware appear stable.  No acute fracture or soft tissue swelling.  Soft tissue irregularity and density in the subcutaneous soft tissues of the heel and distal leg raise the question of cellulitis.  Is no soft tissue gas.                                    Assessment/Plan:     * Wound dehiscence, right ankle  74 yo F with hx of a fall resulting in a pilon fracture to the right ankle on 8/14/24 s/p ORIF. Sutures removed on 09/12. Presents today for swelling and wound dehiscence. No leukocytosis. CRP 9.3 and ESR 49. R XR ankle concerning for possible cellulitis.     -Pain management: Oxycodone 5 mg q4h PRN and robaxin PRN  -Orthopedics consulted:        - Wound irrigated and soft dressings applied in ED (see procedure note below)  - NWB RLE  - Multimodal pain regimen limiting narcotics  - Plan for irrigation and debridement of right ankle wound in OR today (9/25/24)  - Consents signed. Right ankle is marked.  - NPO midnight   - antibiotic deferred until after I&D per ortho recs  - Hold eliquis, ASA and brilinta-- restart after I&D    Closed right pilon fracture  - see wound dehiscence above     Transaminitis  - on admit , , and   - denies abdominal pain or vomiting  - hold statin and prn tylenol   - hep panel pending  - consider liver US if remain elevated  - trend daily CMP     Stage 3b chronic kidney disease  - Cr 2.0 on admit, baseline 1.8  - renally dose meds  - avoid nephrotoxins  - monitor with daily bmp    Acute cystitis without hematuria  - UA infectious on admit  - start abx after I&D  - follow urine culture    Uncontrolled type 2 diabetes mellitus with hyperglycemia, with  "long-term current use of insulin  Patient's FSGs are uncontrolled due to hyperglycemia on current medication regimen.  Last A1c reviewed-   Lab Results   Component Value Date    HGBA1C 8.2 (H) 07/10/2024     Most recent fingerstick glucose reviewed- No results for input(s): "POCTGLUCOSE" in the last 24 hours.  Current correctional scale  Low  Maintain anti-hyperglycemic dose as follows-   Antihyperglycemics (From admission, onward)      Start     Stop Route Frequency Ordered    09/25/24 0008  insulin aspart U-100 pen 0-5 Units         -- SubQ Before meals & nightly PRN 09/24/24 2308    09/24/24 2315  insulin glargine U-100 (Lantus) pen 19 Units         -- SubQ Nightly 09/24/24 2308          Hold Oral hypoglycemics while patient is in the hospital.    Chronic diastolic heart failure  - last echo in 2023 with G1 LV DD  - appear mildly overloaded on exam with 2+ pitting edema to LLE. Congestive changes on CXR  - BNP pending   - will give small dose of IV lasix   - daily weights, strict I/O  - monitor tele     Iron deficiency anemia  Anemia is likely due to Iron deficiency. Most recent hemoglobin and hematocrit are listed below.  Recent Labs     09/24/24  1622   HGB 7.9*   HCT 25.9*     Plan  - Monitor serial CBC: Daily  - Transfuse PRBC if patient becomes hemodynamically unstable, symptomatic or H/H drops below 7/21.  - Patient has not received any PRBC transfusions to date  - Patient's anemia is currently stable    Coronary artery disease of native artery of native heart with stable angina pectoris  Patient with known CAD s/p stent placement, which is controlled Monitor for S/Sx of angina/ACS. Continue to monitor on telemetry.   - HOLD eliquis, ASA and brilinta until after I&D    Mixed hyperlipidemia  - hold statin for transaminitis     Follicular lymphoma  S/p chemo in 2010  - has been lost to follow up with heme/onc. OP referral on discharge    Primary hypertension  - controlled on admit  - continue imdur 60 mg and " toprol 25 mg daily       VTE Risk Mitigation (From admission, onward)           Ordered     IP VTE HIGH RISK PATIENT  Once         09/24/24 2231     Reason for No Pharmacological VTE Prophylaxis  Once        Question:  Reasons:  Answer:  Already adequately anticoagulated on oral Anticoagulants    09/24/24 2231                         On 09/25/2024, patient should be placed in hospital observation services under my care in collaboration with Dr. Kenneth Garcia.           Barb Live PA-C  Department of Hospital Medicine  [facility] - Emergency Dept

## 2024-09-25 NOTE — HPI
"Lorena Contreras is a 74 yo F with PMHx of  DM2, Fibromyalgia, lymphoma s/p chemo, HTN, HLD, CVA, MI, CAD s/p PCIs, CKD3b, HFpEF, and anemia who presented to ED for R ankle wound dehiscence. The patient reports that on 8/14/24, she was walking into a [employer] when she lost her balance, fell, and twisted her ankle. She sustained a pelvic fracture and a right pilon fracture, requiring [place] [facility] [place]. Following the fall, she experienced urinary retention and had an indwelling catheter placed. While at the SNF, the patient reports significant progress, noting that her injuries were healing well, and she regained some mobility. Her wound was evaluated, and sutures were removed on 9/12/24. The patient was discharged home four days ago but describes her experience as "miserable," citing a lack of mobility aids provided for home use. At SNF, she had not been bearing weight on her RLE. She reports while attempting to stand on her RLE with home health her injury opened up. Since then, she has had increased swelling to her R ankle and associated generalized weakness and fatigue, decreased appetite, constipation, SOB (when anxious) and nausea. She denies fevers, chills, rhinorrhea, sore throat, cough, hemoptysis, chest pain, vomiting, diarrhea, or blood in her stool.     ED Course: AFVSS. CBC significant hgb 7.9 (down from 11.8 on month ago). No leukocytosis. CMP with Cr 2.0 (at baseline), transaminitis , , and . BG initially 232, but now 91. CRP 93 and ESR 49. Lactate 0.91. R ankle XR with stable hardware and no acute fx or dislocation, but concerning for cellulitis. Ortho consulted and planning for I&D in AM. Given IV zofran. Placed in observation with HM.     "

## 2024-09-25 NOTE — ANESTHESIA PREPROCEDURE EVALUATION
[facility]  Anesthesia Pre-Operative Evaluation         Patient Name: Lorena Contreras  YOB: 1950  MRN: [id number]    SUBJECTIVE:     Pre-operative evaluation for Procedure(s) (LRB):  IRRIGATION AND DEBRIDEMENT, LOWER EXTREMITY; slider table, supine, bone foam, cysto tubing, 6L NS/dakins/peroxide, culture swabs (Right)     09/24/2024    Lorena Contreras is a 73 y.o. female w/ a significant PMHx of moderate aortic stenosis, mild MR, Mild TR, DM2 (ozempic last month), Fibromyalgia, CKD 3, HTN, anemia, HLD, CVA, MI, and CAD.     Patient underwent ORIF right ankle pilon fracture on 8/14/2024 and ORIF right acetabular fracture 8/16. Patient having right ankle wound dehiscence. Pt stated she stepped on right foot and her incision opened due to the pressure applied.     Last Ozempic injection yesterday 9/24/24. Aware of elevated aspiration risk.    Patient now presents for the above procedure(s).      LDA:        Peripheral IV - Single Lumen 09/24/24 1623 20 G Anterior;Left Forearm (Active)   Site Assessment Clean;Dry;Intact;No redness;No swelling 09/24/24 1623   Extremity Assessment Distal to IV No redness;No swelling;No warmth;No abnormal discoloration 09/24/24 1623   Line Status Blood return noted;Saline locked;Flushed 09/24/24 1623   Dressing Status Clean;Dry;Intact 09/24/24 1623   Dressing Intervention First dressing 09/24/24 1623   Number of days: 0       Prev airway: Intubation:     Induction:  Intravenous    Intubated:  Postinduction    Mask Ventilation:  Easy mask    Attempts:  1    Attempted By:  Resident anesthesiologist    Method of Intubation:  Video laryngoscopy    Blade:  Flower 3    Laryngeal View Grade: Grade I - full view of cords      Difficult Airway Encountered?: No      Complications:  None    Airway Device:  Oral endotracheal tube    Airway Device Size:  7.0    Style/Cuff Inflation:  Cuffed    Tube secured:  21    Secured at:  The lips    Placement Verified  By:  Capnometry    Complicating Factors:  None    Findings Post-Intubation:  Atraumatic/condition of teeth unchanged and BS equal bilateral    Drips: None documented.      Patient Active Problem List   Diagnosis    Anxiety    Primary hypertension    Follicular lymphoma    Mixed hyperlipidemia    Cardiomegaly    Type 2 diabetes mellitus with stage 3 chronic kidney disease, with long-term current use of insulin    Mild nonproliferative diabetic retinopathy of both eyes associated with type 2 diabetes mellitus    Coronary artery disease of native artery of native heart with stable angina pectoris    Hypomagnesemia    Stage 3a chronic kidney disease    Pulmonary hypertension -- echo 11/2019    History of myocardial infarction    Peripheral vascular disease in diabetes mellitus -- CLEMENT 05/2019    Iron deficiency anemia    History of TIA (transient ischemic attack)    RLS (restless legs syndrome)    Aortic atherosclerosis    Osteopenia of neck of femur    Chronic diastolic heart failure    Proliferative diabetic retinopathy of left eye associated with type 2 diabetes mellitus, unspecified proliferative retinopathy type    Hypertensive encephalopathy    Status post tooth extraction    KYA (acute kidney injury)    Uncontrolled type 2 diabetes mellitus with hyperglycemia, with long-term current use of insulin    Elevated troponin    Acute cystitis    Post herpetic neuralgia    Aortic stenosis    Chronic bronchitis, unspecified chronic bronchitis type    Stage 3b chronic kidney disease    Closed fracture of right iliac wing    Multiple fractures of pelvis    Closed displaced fracture of right acetabulum    Transaminitis    Closed right pilon fracture    Closed fracture of superior and inferior rami of right pubis    Acute blood loss anemia    Tremor    Metabolic acidosis    Hypocalcemia    Hypoalbuminemia    Delirium    Constipation    Abdominal pain    Wound dehiscence, right ankle       Review of patient's allergies  indicates:   Allergen Reactions    Novolin 70/30 (semi-synthetic) Nausea And Vomiting     Severe vomiting on Relion 70/30    Sulfa (sulfonamide antibiotics) Anaphylaxis    Talwin [pentazocine lactate] Anaphylaxis    Victoza [liraglutide] Nausea And Vomiting    Glipizide Nausea Only    Citric acid     Codeine     Influenza virus vaccines Hives    Iodine and iodide containing products Hives    Levetiracetam Itching    Neurontin [gabapentin]      Possible associated myoclonic jerk    Rituxan [rituximab] Hives    Zoloft [sertraline] Nausea And Vomiting       Current Inpatient Medications:   acetaminophen  650 mg Oral Q6H       No current facility-administered medications on file prior to encounter.     Current Outpatient Medications on File Prior to Encounter   Medication Sig Dispense Refill    acetaminophen (TYLENOL) 500 MG tablet Take 1 tablet (500 mg total) by mouth every 8 (eight) hours.      albuterol (PROVENTIL/VENTOLIN HFA) 90 mcg/actuation inhaler Inhale 2 puffs into the lungs every 6 (six) hours as needed for Wheezing or Shortness of Breath. 18 g 0    aluminum & magnesium hydroxide-simethicone (MYLANTA MAX STRENGTH) 400-400-40 mg/5 mL suspension Take 30 mLs by mouth every 6 (six) hours as needed for Indigestion.      amoxicillin-clavulanate 875-125mg (AUGMENTIN) 875-125 mg per tablet Take 1 tablet by mouth 2 (two) times daily. 14 tablet 0    apixaban (ELIQUIS) 2.5 mg Tab Take 1 tablet (2.5 mg total) by mouth 2 (two) times daily. End date October 26th 2024      aspirin 81 MG Chew Take 1 tablet (81 mg total) by mouth once daily. Hold to avoid bleed risk on triple therapy and then resume on oct 27 once eliquis stops on oct 26th      atorvastatin (LIPITOR) 80 MG tablet Take 1 tablet by mouth in the evening 90 tablet 3    calcium carbonate (CALCIUM ANTACID) 300 mg (750 mg) Chew Take 1 750 mg tablet daily      DEXCOM G7  Misc Use 1  to track blood glucose, ICD10: E11.65 1 each 0    DEXCOM G7 SENSOR  Samina Use 1 sensor every 10 days to track blood glucose, ICD10: E11.65, okay with 90 day supply if possible 3 each 11    divalproex (DEPAKOTE SPRINKLES) 125 mg capsule Take 2 capsules (250 mg total) by mouth every 8 (eight) hours. Per psych MD can stop while at [facility] if doing well without any altered mental status while there but continue on discharge from hospital for now      FLUoxetine 40 MG capsule Take 1 capsule (40 mg total) by mouth once daily. 90 capsule 3    hydrOXYzine HCL (ATARAX) 25 MG tablet Take 1 tablet (25 mg total) by mouth 3 (three) times daily as needed for Itching.      insulin aspart U-100 (NOVOLOG) 100 unit/mL (3 mL) InPn pen Inject 0-10 Units into the skin before meals and at bedtime as needed (Hyperglycemia).      insulin glargine U-100, Lantus, 100 unit/mL (3 mL) SubQ InPn pen Inject 19 Units into the skin every evening.      isosorbide mononitrate (IMDUR) 60 MG 24 hr tablet Take 1 tablet (60 mg total) by mouth once daily.      melatonin (MELATIN) 3 mg tablet Take 2 tablets (6 mg total) by mouth nightly.      methocarbamoL (ROBAXIN) 500 MG Tab Take 1 tablet (500 mg total) by mouth 4 (four) times daily.      metoprolol succinate (TOPROL-XL) 25 MG 24 hr tablet Take 1 tablet (25 mg total) by mouth once daily. 90 tablet 3    ondansetron (ZOFRAN-ODT) 8 MG TbDL Take 1 tablet (8 mg total) by mouth every 8 (eight) hours as needed (nausea).      oxyCODONE (ROXICODONE) 10 mg Tab immediate release tablet Take 1 tablet (10 mg total) by mouth every 6 (six) hours as needed (pain scale 7-10). 10 tablet 0    oxyCODONE (ROXICODONE) 5 MG immediate release tablet Take 1 tablet (5 mg total) by mouth every 6 (six) hours as needed (pain scale 4-6). 10 tablet 0    OZEMPIC 1 mg/dose (4 mg/3 mL) Inject 1 mg into the skin every 7 days. HOLD while at [facility]      pantoprazole (PROTONIX) 40 MG tablet Take 1 tablet (40 mg total) by mouth once daily. 90 tablet 3    pen needle, diabetic (BD ULTRA-FINE SAGAR PEN NEEDLE) 32 gauge x  "5/32" Ndle One pen needle use with insulin pen 4 times a day.  ICD-10: E11.9 150 each 11    polyethylene glycol (GLYCOLAX) 17 gram PwPk Take 17 g by mouth daily as needed for Constipation.      QUEtiapine (SEROQUEL) 50 MG tablet Take 1 tablet (50 mg total) by mouth every evening.      semaglutide (OZEMPIC) 0.25 mg or 0.5 mg (2 mg/3 mL) pen injector Inject 0.5 mg once weekly thereafter    HOLD at       ticagrelor (BRILINTA) 90 mg tablet Take 1 tablet (90 mg total) by mouth 2 (two) times daily.         Past Surgical History:   Procedure Laterality Date    COLONOSCOPY  11/07/2012    Colon polyp found; repeat in 5 years    ELBOW SURGERY Right 2015    dislocation repair     ESOPHAGOGASTRODUODENOSCOPY  11/07/2012    atrophic gastritis, H pylori testing negative    INCISION AND DRAINAGE FOOT Right 6/2/2021    Procedure: INCISION AND DRAINAGE, FOOT, bone biopsy;  Surgeon: Quiana Penn DPM;  Location: [facility];  Service: Podiatry;  Laterality: Right;    KNEE SURGERY Bilateral 2015    scoped    LEFT HEART CATHETERIZATION Left 3/29/2019    Procedure: Left heart cath;  Surgeon: Bladimir Barbosa MD;  Location: [facility] CATH LAB;  Service: Cardiology;  Laterality: Left;    LEFT HEART CATHETERIZATION Left 11/18/2019    Procedure: Left heart cath;  Surgeon: Karl Rico MD;  Location: [facility] CATH LAB;  Service: Cardiology;  Laterality: Left;    LEFT HEART CATHETERIZATION Left 1/8/2020    Procedure: Left heart cath, right radial, noon start;  Surgeon: Christos Monreal MD;  Location: [facility] CATH LAB;  Service: Cardiology;  Laterality: Left;  RN Pre Op 1-6-20.  To be admitted 1-7-20 sor Aspirin Disensitation    OPEN REDUCTION AND INTERNAL FIXATION (ORIF) OF FRACTURE OF ACETABULUM Right 8/16/2024    Procedure: ORIF, FRACTURE, ACETABULUM;  Surgeon: Neto Davalos MD;  Location: [address];  Service: Orthopedics;  Laterality: Right;  anterior and lateral pelvic incisions    OPEN REDUCTION AND INTERNAL FIXATION (ORIF) OF " PILON FRACTURE Right 8/14/2024    Procedure: ORIF, FRACTURE, PILON;  Surgeon: Neto Davalos MD;  Location: 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  2 dtr.  Lives with .  3 cats and a dog.  Retired.  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  Lives in house, 1 story and 4 steps up and has a ramp.      Enjoys crafting.  Unable to bowl due to myalgias.       Social Determinants of Health     Financial Resource Strain: Low Risk  (8/14/2024)    Overall Financial Resource Strain (CARDIA)     Difficulty of Paying Living Expenses: Not hard at all   Food Insecurity: No Food Insecurity (8/14/2024)    Hunger Vital Sign     Worried About Running Out of Food in the Last Year: Never true     Ran Out of Food in the Last Year: Never true   Transportation Needs: No Transportation Needs (8/14/2024)    TRANSPORTATION NEEDS     Transportation : No   Physical Activity: Inactive (8/14/2024)    Exercise Vital Sign     Days of Exercise per Week: 0 days     Minutes of Exercise per Session: 0 min   Stress: No Stress Concern Present (8/14/2024)    [place] [employer] - Occupational Stress Questionnaire     Feeling of Stress : Not at all   Housing Stability: Low Risk  (8/14/2024)    Housing Stability Vital Sign     Unable to Pay for Housing in the Last Year: No     Homeless in the Last Year:  No       OBJECTIVE:     Vital Signs Range (Last 24H):  Temp:  [36.6 °C (97.9 °F)-37.6 °C (99.6 °F)]   Pulse:  [73-99]   Resp:  [18]   BP: (124-149)/(68-72)   SpO2:  [93 %-99 %]       Significant Labs:  Lab Results   Component Value Date    WBC 9.42 09/24/2024    HGB 7.9 (L) 09/24/2024    HCT 25.9 (L) 09/24/2024     09/24/2024    CHOL 132 07/10/2024    TRIG 147 07/10/2024    HDL 42 07/10/2024     (H) 09/24/2024     (H) 09/24/2024     (L) 09/24/2024    K 4.5 09/24/2024     09/24/2024    CREATININE 2.0 (H) 09/24/2024    BUN 36 (H) 09/24/2024    CO2 22 (L) 09/24/2024    TSH 1.181 08/29/2024    INR 1.0 04/10/2023    HGBA1C 8.2 (H) 07/10/2024       Diagnostic Studies: No relevant studies.    EKG:   Results for orders placed or performed during the hospital encounter of 08/29/24   EKG 12-lead    Collection Time: 08/29/24 10:57 AM   Result Value Ref Range    QRS Duration 78 ms    OHS QTC Calculation 482 ms    Narrative    Test Reason : F41.9,    Vent. Rate : 097 BPM     Atrial Rate : 097 BPM     P-R Int : 174 ms          QRS Dur : 078 ms      QT Int : 380 ms       P-R-T Axes : 049 002 -09 degrees     QTc Int : 482 ms    Normal sinus rhythm  Minimal voltage criteria for LVH, may be normal variant  Inferior infarct (cited on or before 29-AUG-2024)  Anterolateral infarct ,age undetermined  Abnormal ECG  When compared with ECG of 18-AUG-2024 15:58,  Significant changes have occurred  Confirmed by Karl Rico MD ([id number]) on 8/29/2024 3:14:34 PM    Referred By: [email or web address]   SELF           Confirmed By:Karl Rico MD       2D ECHO:  TTE:  Results for orders placed or performed during the hospital encounter of 11/03/23   Echo   Result Value Ref Range    BSA 1.99 m2    LVOT stroke volume 58.09 cm3    LVIDd 4.33 3.5 - 6.0 cm    LV Systolic Volume 32.06 mL    LV Systolic Volume Index 16.3 mL/m2    LVIDs 2.89 2.1 - 4.0 cm    LV Diastolic Volume 84.22 mL    LV Diastolic Volume Index 42.75 mL/m2    IVS  1.19 (A) 0.6 - 1.1 cm    LVOT diameter 2.00 cm    LVOT area 3.1 cm2    FS 33 28 - 44 %    Left Ventricle Relative Wall Thickness 0.74 cm    Posterior Wall 1.60 (A) 0.6 - 1.1 cm    LV mass 233.36 g    LV Mass Index 118 g/m2    MV Peak E Mike 1.42 m/s    TDI LATERAL 0.06 m/s    TDI SEPTAL 0.05 m/s    E/E' ratio 25.82 m/s    MV Peak A Mike 1.41 m/s    TR Max Mike 2.83 m/s    E/A ratio 1.01     IVRT 95.15 msec    E wave deceleration time 244.20 msec    LV SEPTAL E/E' RATIO 28.40 m/s    LV LATERAL E/E' RATIO 23.67 m/s    LVOT peak mike 0.75 m/s    Left Ventricular Outflow Tract Mean Velocity 0.55 cm/s    Left Ventricular Outflow Tract Mean Gradient 1.38 mmHg    RVDD 3.46 cm    RV S' 15.82 cm/s    TAPSE 2.01 cm    LA size 4.11 cm    Left Atrium Minor Axis 5.85 cm    Left Atrium Major Axis 6.22 cm    RA Major Axis 5.59 cm    AV mean gradient 18 mmHg    AV peak gradient 27 mmHg    Ao peak mike 2.59 m/s    Ao VTI 57.10 cm    LVOT peak VTI 18.50 cm    AV valve area 1.02 cm²    AV Velocity Ratio 0.29     AV index (prosthetic) 0.32     ZENON by Velocity Ratio 0.91 cm²    MV stenosis pressure 1/2 time 70.82 ms    MV valve area p 1/2 method 3.11 cm2    Triscuspid Valve Regurgitation Peak Gradient 32 mmHg    PV PEAK VELOCITY 1.24 m/s    PV peak gradient 6 mmHg    Sinus 2.75 cm    IVC diameter 1.64 cm    Mean e' 0.06 m/s    ZLVIDS -1.47     ZLVIDD -2.68     LA Volume Index 55.6 mL/m2    LA volume 109.53 cm3    LA WIDTH 5.2 cm    RA Width 3.4 cm    TV resting pulmonary artery pressure 35 mmHg    RV TB RVSP 6 mmHg    Est. RA pres 3 mmHg    Narrative      Left Ventricle: The left ventricle is normal in size. Increased wall   thickness. Moderate septal thickening. Normal wall motion. There is normal   systolic function with a visually estimated ejection fraction of 65 - 70%.   Grade I diastolic dysfunction.    Right Ventricle: Normal right ventricular cavity size. Systolic   function is normal.    Left Atrium: Left atrium is severely  dilated.    Aortic Valve: There is moderate stenosis. Aortic valve area by VTI is   1.02 cm². Aortic valve peak velocity is 2.59 m/s. Mean gradient is 18   mmHg. The dimensionless index is 0.32.    Mitral Valve: There is mild regurgitation.    Tricuspid Valve: There is mild regurgitation.    Pulmonary Artery: The estimated pulmonary artery systolic pressure is   35 mmHg.    IVC/SVC: Normal venous pressure at 3 mmHg.         CASSANDRA:  No results found. However, due to the size of the patient record, not all encounters were searched. Please check Results Review for a complete set of results.    ASSESSMENT/PLAN:       Pre-op Assessment    I have reviewed the Patient Summary Reports.     I have reviewed the Nursing Notes. I have reviewed the NPO Status.   I have reviewed the Medications.     Review of Systems  Anesthesia Hx:  No problems with previous Anesthesia   History of prior surgery of interest to airway management or planning:            Denies Personal Hx of Anesthesia complications.                    Social:  Non-Smoker       Cardiovascular:     Hypertension  Past MI CAD   CABG/stent   Angina         See stent report attached   Cardiovascular Symptoms: Angina       Coronary Artery Disease:          Hx of Myocardial Infarction                  Hypertension         Pulmonary:    Denies COPD.  Denies Asthma.     Denies Sleep Apnea.                Renal/:  Chronic Renal Disease, CKD        Kidney Function/Disease             Hepatic/GI:     GERD             Neurological:   CVA Neuromuscular Disease,   Seizures           Seizure Disorder             CVA - Cerebrovasular Accident               Neuromuscular Disease   Endocrine:  Diabetes, poorly controlled, type 2, using insulin    Diabetes, Type 2 Diabetes                      Psych:  Psychiatric History                  Physical Exam  General: Well nourished, Cooperative, Alert and Oriented    Airway:  Mallampati: II / I  Mouth Opening: Normal  TM Distance: 4 - 6  cm  Tongue: Normal  Neck ROM: Normal ROM    Dental:  Intact        Anesthesia Plan  Type of Anesthesia, risks & benefits discussed:    Anesthesia Type: Gen ETT, MAC  Intra-op Monitoring Plan: Standard ASA Monitors  Post Op Pain Control Plan: multimodal analgesia and IV/PO Opioids PRN  Induction:  IV  Airway Plan: , Post-Induction  Informed Consent: Informed consent signed with the Patient and all parties understand the risks and agree with anesthesia plan.  All questions answered.   ASA Score: 3  Day of Surgery Review of History & Physical: H&P Update referred to the surgeon/provider.  Anesthesia Plan Notes: Chart reviewed, patient interviewed and examined.  The anesthetic plan was explained.  Risks, benefits, and alternatives were discussed. Given patient's comorbidities and elevated aspiration risk (Ozempic injection yesterday) will plan for regional block with MAC. Backup plan in the event that patient doesn't tolerate the procedure, will induce GETA with RSI. Questions were answered and the consent was signed.      Noelle Hsieh MD    Ready For Surgery From Anesthesia Perspective.     .

## 2024-09-25 NOTE — ASSESSMENT & PLAN NOTE
Patient is identified as having Diastolic (HFpEF) heart failure that is Acute on chronic. CHF is currently uncontrolled due to Pulmonary edema/pleural effusion on CXR. CXR on admit with mild pulmonary congestion and BNP elevated at 1373 on admit. Patient with mild excerebration. Latest ECHO performed and demonstrates- Results for orders placed during the hospital encounter of 11/03/23    Echo    Interpretation Summary    Left Ventricle: The left ventricle is normal in size. Increased wall thickness. Moderate septal thickening. Normal wall motion. There is normal systolic function with a visually estimated ejection fraction of 65 - 70%. Grade I diastolic dysfunction.    Right Ventricle: Normal right ventricular cavity size. Systolic function is normal.    Left Atrium: Left atrium is severely dilated.    Aortic Valve: There is moderate stenosis. Aortic valve area by VTI is 1.02 cm². Aortic valve peak velocity is 2.59 m/s. Mean gradient is 18 mmHg. The dimensionless index is 0.32.    Mitral Valve: There is mild regurgitation.    Tricuspid Valve: There is mild regurgitation.    Pulmonary Artery: The estimated pulmonary artery systolic pressure is 35 mmHg.    IVC/SVC: Normal venous pressure at 3 mmHg.    - Start IV Lasix 40 mg BID to treat mild heart failure. Excerebration very mild so okay to proceed with surgery as patient not hypoxic and not having any respiratory distress.   - Continue home Imdur and Toprol XL for chronic heart failure and monitor clinical status closely. Monitor on telemetry.   - Patient is off CHF pathway.    - Monitor strict Is&Os and daily weights.    - Place on fluid restriction of 1.5 L. Cardiology has not been consulted.   - Continue to stress to patient importance of self efficacy and  on diet for CHF.   - Last BNP reviewed- and noted below   Recent Labs   Lab 09/25/24  0248   BNP 1,373*

## 2024-09-25 NOTE — ANESTHESIA PROCEDURE NOTES
Right Saphenous SS    Patient location during procedure: OR    Reason for block: primary anesthetic    Diagnosis: Right lower extremity I&D   Start time: 9/25/2024 12:53 PM  Timeout: 9/25/2024 12:52 PM   End time: 9/25/2024 12:55 PM    Staffing  Authorizing Provider: Greg Pickering MD  Performing Provider: Elias Woodruff MD    Staffing  Performed by: Elias Woodruff MD  Authorized by: Greg Pickering MD    Preanesthetic Checklist  Completed: patient identified, IV checked, site marked, risks and benefits discussed, surgical consent, monitors and equipment checked, pre-op evaluation and timeout performed  Peripheral Block  Patient position: supine  Prep: ChloraPrep  Patient monitoring: heart rate, cardiac monitor, continuous pulse ox, continuous capnometry and frequent blood pressure checks  Block type: saphenous  Laterality: right  Injection technique: single shot  Needle  Needle type: Echogenic   Needle gauge: 20 G  Needle length: 4 in  Needle localization: anatomical landmarks and ultrasound guidance   -ultrasound image captured on disc.  Assessment  Injection assessment: negative aspiration, negative parasthesia and local visualized surrounding nerve  Paresthesia pain: none  Heart rate change: no  Slow fractionated injection: yes  Pain Tolerance: comfortable throughout block  Medications:    Medications: ropivacaine (NAROPIN) injection 0.5% - Perineural, Other   20 mL - 9/25/2024 12:55:00 PM    Additional Notes  VSS.  [facility] RN monitoring vitals throughout procedure.  Patient tolerated procedure well.

## 2024-09-25 NOTE — ED NOTES
Received report form Cait BLANCO. Assumed care of patient. Patient is alert and resting comfortably in bed in NAD. BP, cardiac, and O2 monitoring continued. Patient updated on plan of care, pt denies any needs or complaints at this time. Bed locked in lowest position, side rails up x2, call light within reach. VSS. Will continue to monitor.

## 2024-09-25 NOTE — SUBJECTIVE & OBJECTIVE
Principal Problem:Wound dehiscence    Principal Orthopedic Problem: as above    Interval History: Afebrile, VSS, NAEON.  Hgb 7.9 on AM labs; 2u prbc prepped/held for OR.  Pain has been reportedly well controlled.  Currently NPO for I&D right ankle this morning.          Review of patient's allergies indicates:   Allergen Reactions    Novolin 70/30 (semi-synthetic) Nausea And Vomiting     Severe vomiting on Relion 70/30    Sulfa (sulfonamide antibiotics) Anaphylaxis    Talwin [pentazocine lactate] Anaphylaxis    Victoza [liraglutide] Nausea And Vomiting    Glipizide Nausea Only    Citric acid     Codeine     Influenza virus vaccines Hives    Iodine and iodide containing products Hives    Levetiracetam Itching    Neurontin [gabapentin]      Possible associated myoclonic jerk    Rituxan [rituximab] Hives    Zoloft [sertraline] Nausea And Vomiting       Current Facility-Administered Medications   Medication    acetaminophen tablet 650 mg    albuterol-ipratropium 2.5 mg-0.5 mg/3 mL nebulizer solution 3 mL    aluminum-magnesium hydroxide-simethicone 200-200-20 mg/5 mL suspension 30 mL    aluminum-magnesium hydroxide-simethicone 200-200-20 mg/5 mL suspension 30 mL    dextrose 10% bolus 125 mL 125 mL    dextrose 10% bolus 250 mL 250 mL    FLUoxetine capsule 40 mg    glucagon (human recombinant) injection 1 mg    glucose chewable tablet 16 g    glucose chewable tablet 24 g    hydrOXYzine HCL tablet 25 mg    insulin aspart U-100 pen 0-5 Units    insulin glargine U-100 (Lantus) pen 19 Units    isosorbide mononitrate 24 hr tablet 60 mg    melatonin tablet 6 mg    methocarbamoL tablet 500 mg    metoprolol succinate (TOPROL-XL) 24 hr tablet 25 mg    naloxone 0.4 mg/mL injection 0.02 mg    ondansetron tablet 4 mg    oxyCODONE immediate release tablet 5 mg    polyethylene glycol packet 17 g    prochlorperazine injection Soln 5 mg    QUEtiapine tablet 50 mg    simethicone chewable tablet 80 mg    sodium chloride 0.9% flush 5 mL     "sucralfate 100 mg/mL suspension 1 g     Objective:     Vital Signs (Most Recent):  Temp: 98.2 °F (36.8 °C) (09/25/24 0333)  Pulse: 89 (09/25/24 0333)  Resp: 18 (09/25/24 0333)  BP: 139/65 (09/25/24 0333)  SpO2: (!) 94 % (09/25/24 0333) Vital Signs (24h Range):  Temp:  [97.9 °F (36.6 °C)-99.6 °F (37.6 °C)] 98.2 °F (36.8 °C)  Pulse:  [73-99] 89  Resp:  [18] 18  SpO2:  [93 %-99 %] 94 %  BP: (124-165)/(65-72) 139/65     Weight: 85.4 kg (188 lb 4.4 oz)  Height: 5' 10" (177.8 cm)  Body mass index is 27.01 kg/m².    No intake or output data in the 24 hours ending 09/25/24 0722     Ortho/SPM Exam  RLE:  Inspection: Medial ankle with dehisced surgical wound and visible medial hardware  Light serosanguinous drainage from wound   Mild swelling around medial ankle  No surrounding erythema or signs of cellulitis  No purulent drainage    Palpation: TTP at ankle; otherwise non-TTP throughout.  No palpable abnormality.   Compartments soft and compressible.   ROM: AROM and PROM of the hip, knee, and toes intact without pain.  AROM and PROM of ankle and foot limited 2/2 pain  No evidence of joint dislocation or abnormal laxity.   Neuro: TA/Gastroc/EHL/FHL assessed in isolation without deficit.   SILT Sa/Welsh/DP/SP/T nerve distributions   Vascular: DP and PT arteries palpated 1+. Capillary refill <3s.        Significant Labs: All pertinent labs within the past 24 hours have been reviewed.    Significant Imaging: I have reviewed all pertinent imaging results/findings.  "

## 2024-09-25 NOTE — PLAN OF CARE
Problem: Adult Inpatient Plan of Care  Goal: Plan of Care Review  Outcome: Progressing  Goal: Patient-Specific Goal (Individualized)  Outcome: Progressing  Goal: Absence of Hospital-Acquired Illness or Injury  Outcome: Progressing  Goal: Optimal Comfort and Wellbeing  Outcome: Progressing  Goal: Readiness for Transition of Care  Outcome: Progressing     Problem: Infection  Goal: Absence of Infection Signs and Symptoms  Outcome: Progressing     Problem: Diabetes Comorbidity  Goal: Blood Glucose Level Within Targeted Range  Outcome: Progressing     Problem: Acute Kidney Injury/Impairment  Goal: Fluid and Electrolyte Balance  Outcome: Progressing  Goal: Improved Oral Intake  Outcome: Progressing  Goal: Effective Renal Function  Outcome: Progressing     Problem: Wound  Goal: Optimal Coping  Outcome: Progressing  Goal: Optimal Functional Ability  Outcome: Progressing  Goal: Absence of Infection Signs and Symptoms  Outcome: Progressing  Goal: Improved Oral Intake  Outcome: Progressing  Goal: Optimal Pain Control and Function  Outcome: Progressing  Goal: Skin Health and Integrity  Outcome: Progressing  Goal: Optimal Wound Healing  Outcome: Progressing     Problem: Skin Injury Risk Increased  Goal: Skin Health and Integrity  Outcome: Progressing

## 2024-09-25 NOTE — ASSESSMENT & PLAN NOTE
"Patient's FSGs are uncontrolled due to hyperglycemia on current medication regimen.  Last A1c reviewed-   Lab Results   Component Value Date    HGBA1C 8.2 (H) 07/10/2024     Most recent fingerstick glucose reviewed- No results for input(s): "POCTGLUCOSE" in the last 24 hours.  Current correctional scale  Low  Maintain anti-hyperglycemic dose as follows-   Antihyperglycemics (From admission, onward)      Start     Stop Route Frequency Ordered    09/25/24 0008  insulin aspart U-100 pen 0-5 Units         -- SubQ Before meals & nightly PRN 09/24/24 2308    09/24/24 2315  insulin glargine U-100 (Lantus) pen 19 Units         -- SubQ Nightly 09/24/24 2308          Hold Oral hypoglycemics while patient is in the hospital.  "

## 2024-09-25 NOTE — ANESTHESIA POSTPROCEDURE EVALUATION
Anesthesia Post Evaluation    Patient: Lorena Contreras    Procedure(s) Performed: Procedure(s) (LRB):  IRRIGATION AND DEBRIDEMENT, LOWER EXTREMITY; slider table, supine, bone foam, cysto tubing, 6L NS/dakins/peroxide, culture swabs (Right)  APPLICATION, WOUND VAC (Right)  DEBRIDEMENT, LOCAL FLAP (Right)    Final Anesthesia Type: regional (MAC)      Patient location during evaluation: PACU  Patient participation: Yes- Able to Participate  Level of consciousness: awake and alert and oriented  Post-procedure vital signs: reviewed and stable  Pain management: adequate  Airway patency: patent    PONV status at discharge: No PONV  Anesthetic complications: no      Cardiovascular status: blood pressure returned to baseline  Respiratory status: unassisted and spontaneous ventilation  Hydration status: euvolemic  Follow-up not needed.              Vitals Value Taken Time   /80 09/25/24 1516   Temp 36.8 °C (98.2 °F) 09/25/24 1502   Pulse 89 09/25/24 1519   Resp 16 09/25/24 1519   SpO2 99 % 09/25/24 1519   Vitals shown include unfiled device data.      Event Time   Out of Recovery 09/25/2024 15:15:00         Pain/Bharathi Score: Pain Rating Prior to Med Admin: 0 (9/25/2024  5:46 AM)  Pain Rating Post Med Admin: 3 (9/25/2024  6:46 AM)  Bharathi Score: 9 (9/25/2024  3:15 PM)

## 2024-09-25 NOTE — ED TRIAGE NOTES
Lorena Contreras, a 73 y.o. female presents to the ED w/ complaint of right ankle surgical site issue. Pt stated she stepped on right foot and her incision opened due to the pressure applied.    Triage note:  Chief Complaint   Patient presents with    Post-op Problem     Arrived via  ems from home with c/o wound check to  surgical site, site opened today, noted bleeding from site, dressed prior to EMS arrival     Review of patient's allergies indicates:   Allergen Reactions    Novolin 70/30 (semi-synthetic) Nausea And Vomiting     Severe vomiting on Relion 70/30    Sulfa (sulfonamide antibiotics) Anaphylaxis    Talwin [pentazocine lactate] Anaphylaxis    Victoza [liraglutide] Nausea And Vomiting    Glipizide Nausea Only    Citric acid     Codeine     Influenza virus vaccines Hives    Iodine and iodide containing products Hives    Levetiracetam Itching    Neurontin [gabapentin]      Possible associated myoclonic jerk    Rituxan [rituximab] Hives    Zoloft [sertraline] Nausea And Vomiting     Past Medical History:   Diagnosis Date    Allergy     Altered mental status 06/19/2022    DYSARTHRIA, SPASTIC MOVEMENTS & DIFFICULTY SWALLOWING    Anemia     Anxiety     Arthritis     Cataract     both removed    Colon polyps     Coronary artery disease     Depression     Diabetes mellitus, type II     Disorder of kidney and ureter     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/2023    Fibromyalgia     Follicular lymphoma     GERD (gastroesophageal reflux disease)     HTN (hypertension)     Hyperlipidemia     MI (myocardial infarction) 03/2019    Personal history of colonic polyps     Restless leg syndrome     Stroke

## 2024-09-25 NOTE — ASSESSMENT & PLAN NOTE
- on admit , , and   - denies abdominal pain or vomiting  - hold statin and prn tylenol   - hep panel pending  - consider liver US if remain elevated  - trend daily CMP

## 2024-09-25 NOTE — ASSESSMENT & PLAN NOTE
- Patient on admit labs with , , and  and normal T. Jc.  - Patient denies abdominal pain or vomiting.  - Patient on Lipitor and Tylenol at home and either medication can effect liver so will hold both meds.   - Acute hepatitis panel on admit negative.   - Trend daily CMP.  - No signs of liver failure. Do not suspect underlying liver disease and likely medication effect.

## 2024-09-25 NOTE — ASSESSMENT & PLAN NOTE
Anemia is likely due to Iron deficiency. Most recent hemoglobin and hematocrit are listed below.  Recent Labs     09/24/24  1622   HGB 7.9*   HCT 25.9*     Plan  - Monitor serial CBC: Daily  - Transfuse PRBC if patient becomes hemodynamically unstable, symptomatic or H/H drops below 7/21.  - Patient has not received any PRBC transfusions to date  - Patient's anemia is currently stable

## 2024-09-25 NOTE — ANESTHESIA PROCEDURE NOTES
Right Popliteal SS    Patient location during procedure: OR    Reason for block: primary anesthetic    Diagnosis: Right lower extremity I&D   Start time: 9/25/2024 12:46 PM  Timeout: 9/25/2024 12:45 PM   End time: 9/25/2024 12:52 PM    Staffing  Authorizing Provider: Greg Pickering MD  Performing Provider: Elias Woodruff MD    Staffing  Performed by: Elias Woodruff MD  Authorized by: Greg Pickering MD    Preanesthetic Checklist  Completed: patient identified, IV checked, site marked, risks and benefits discussed, surgical consent, monitors and equipment checked, pre-op evaluation and timeout performed  Peripheral Block  Patient position: supine  Prep: ChloraPrep  Patient monitoring: heart rate, cardiac monitor, continuous pulse ox, continuous capnometry and frequent blood pressure checks  Block type: popliteal  Laterality: right  Injection technique: single shot  Needle  Needle type: Echogenic   Needle gauge: 20 G  Needle length: 4 in  Needle localization: anatomical landmarks and ultrasound guidance   -ultrasound image captured on disc.  Assessment  Injection assessment: negative aspiration, negative parasthesia and local visualized surrounding nerve  Paresthesia pain: none  Heart rate change: no  Slow fractionated injection: yes  Pain Tolerance: comfortable throughout block  Medications:    Medications: ropivacaine (NAROPIN) injection 0.5% - Perineural, Right Popliteal   30 mL - 9/25/2024 12:56:00 PM    Additional Notes  VSS.  [facility] RN monitoring vitals throughout procedure.  Patient tolerated procedure well.

## 2024-09-25 NOTE — SUBJECTIVE & OBJECTIVE
Past Medical History:   Diagnosis Date    Allergy     Altered mental status 06/19/2022    DYSARTHRIA, SPASTIC MOVEMENTS & DIFFICULTY SWALLOWING    Anemia     Anxiety     Arthritis     Cataract     both removed    Colon polyps     Coronary artery disease     Depression     Diabetes mellitus, type II     Disorder of kidney and ureter     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/2023    Fibromyalgia     Follicular lymphoma     GERD (gastroesophageal reflux disease)     HTN (hypertension)     Hyperlipidemia     MI (myocardial infarction) 03/2019    Personal history of colonic polyps     Restless leg syndrome     Stroke        Past Surgical History:   Procedure Laterality Date    COLONOSCOPY  11/07/2012    Colon polyp found; repeat in 5 years    ELBOW SURGERY Right 2015    dislocation repair     ESOPHAGOGASTRODUODENOSCOPY  11/07/2012    atrophic gastritis, H pylori testing negative    INCISION AND DRAINAGE FOOT Right 6/2/2021    Procedure: INCISION AND DRAINAGE, FOOT, bone biopsy;  Surgeon: Quiana Penn DPM;  Location: [facility] OR;  Service: Podiatry;  Laterality: Right;    KNEE SURGERY Bilateral 2015    scoped    LEFT HEART CATHETERIZATION Left 3/29/2019    Procedure: Left heart cath;  Surgeon: Bladimir Barbosa MD;  Location: [facility] CATH LAB;  Service: Cardiology;  Laterality: Left;    LEFT HEART CATHETERIZATION Left 11/18/2019    Procedure: Left heart cath;  Surgeon: Karl Rico MD;  Location: [facility] CATH LAB;  Service: Cardiology;  Laterality: Left;    LEFT HEART CATHETERIZATION Left 1/8/2020    Procedure: Left heart cath, right radial, noon start;  Surgeon: Christos Monreal MD;  Location: [facility] CATH LAB;  Service: Cardiology;  Laterality: Left;  RN Pre Op 1-6-20.  To be admitted 1-7-20 sor Aspirin Disensitation    OPEN REDUCTION AND INTERNAL FIXATION (ORIF) OF FRACTURE OF ACETABULUM Right 8/16/2024    Procedure: ORIF, FRACTURE, ACETABULUM;  Surgeon: Neto Davalos MD;  Location: [address];  Service:  Orthopedics;  Laterality: Right;  anterior and lateral pelvic incisions    OPEN REDUCTION AND INTERNAL FIXATION (ORIF) OF PILON FRACTURE Right 8/14/2024    Procedure: ORIF, FRACTURE, PILON;  Surgeon: Neto Davalos MD;  Location: [facility] OR [address] [place] [address];  Service: Orthopedics;  Laterality: Right;    TONSILLECTOMY  1955    ULTRASOUND GUIDANCE  1/8/2020    Procedure: Ultrasound Guidance;  Surgeon: Christos Monreal MD;  Location: [facility] CATH LAB;  Service: Cardiology;;       Review of patient's allergies indicates:   Allergen Reactions    Novolin 70/30 (semi-synthetic) Nausea And Vomiting     Severe vomiting on Relion 70/30    Sulfa (sulfonamide antibiotics) Anaphylaxis    Talwin [pentazocine lactate] Anaphylaxis    Victoza [liraglutide] Nausea And Vomiting    Glipizide Nausea Only    Citric acid     Codeine     Influenza virus vaccines Hives    Iodine and iodide containing products Hives    Levetiracetam Itching    Neurontin [gabapentin]      Possible associated myoclonic jerk    Rituxan [rituximab] Hives    Zoloft [sertraline] Nausea And Vomiting       No current facility-administered medications for this encounter.     Current Outpatient Medications   Medication Sig    acetaminophen (TYLENOL) 500 MG tablet Take 1 tablet (500 mg total) by mouth every 8 (eight) hours.    albuterol (PROVENTIL/VENTOLIN HFA) 90 mcg/actuation inhaler Inhale 2 puffs into the lungs every 6 (six) hours as needed for Wheezing or Shortness of Breath.    aluminum & magnesium hydroxide-simethicone (MYLANTA MAX STRENGTH) 400-400-40 mg/5 mL suspension Take 30 mLs by mouth every 6 (six) hours as needed for Indigestion.    amoxicillin-clavulanate 875-125mg (AUGMENTIN) 875-125 mg per tablet Take 1 tablet by mouth 2 (two) times daily.    apixaban (ELIQUIS) 2.5 mg Tab Take 1 tablet (2.5 mg total) by mouth 2 (two) times daily. End date October 26th 2024    aspirin 81 MG Chew Take 1 tablet (81 mg total) by mouth once daily. Hold to avoid bleed risk on  triple therapy and then resume on oct 27 once eliquis stops on oct 26th    atorvastatin (LIPITOR) 80 MG tablet Take 1 tablet by mouth in the evening    calcium carbonate (CALCIUM ANTACID) 300 mg (750 mg) Chew Take 1 750 mg tablet daily    DEXCOM G7  Misc Use 1  to track blood glucose, ICD10: E11.65    DEXCOM G7 SENSOR Samina Use 1 sensor every 10 days to track blood glucose, ICD10: E11.65, okay with 90 day supply if possible    divalproex (DEPAKOTE SPRINKLES) 125 mg capsule Take 2 capsules (250 mg total) by mouth every 8 (eight) hours. Per psych MD can stop while at [facility] if doing well without any altered mental status while there but continue on discharge from hospital for now    FLUoxetine 40 MG capsule Take 1 capsule (40 mg total) by mouth once daily.    hydrOXYzine HCL (ATARAX) 25 MG tablet Take 1 tablet (25 mg total) by mouth 3 (three) times daily as needed for Itching.    insulin aspart U-100 (NOVOLOG) 100 unit/mL (3 mL) InPn pen Inject 0-10 Units into the skin before meals and at bedtime as needed (Hyperglycemia).    insulin glargine U-100, Lantus, 100 unit/mL (3 mL) SubQ InPn pen Inject 19 Units into the skin every evening.    isosorbide mononitrate (IMDUR) 60 MG 24 hr tablet Take 1 tablet (60 mg total) by mouth once daily.    melatonin (MELATIN) 3 mg tablet Take 2 tablets (6 mg total) by mouth nightly.    methocarbamoL (ROBAXIN) 500 MG Tab Take 1 tablet (500 mg total) by mouth 4 (four) times daily.    metoprolol succinate (TOPROL-XL) 25 MG 24 hr tablet Take 1 tablet (25 mg total) by mouth once daily.    ondansetron (ZOFRAN-ODT) 8 MG TbDL Take 1 tablet (8 mg total) by mouth every 8 (eight) hours as needed (nausea).    oxyCODONE (ROXICODONE) 10 mg Tab immediate release tablet Take 1 tablet (10 mg total) by mouth every 6 (six) hours as needed (pain scale 7-10).    oxyCODONE (ROXICODONE) 5 MG immediate release tablet Take 1 tablet (5 mg total) by mouth every 6 (six) hours as needed (pain scale 4-6).  "   OZEMPIC 1 mg/dose (4 mg/3 mL) Inject 1 mg into the skin every 7 days. HOLD while at [facility]    pantoprazole (PROTONIX) 40 MG tablet Take 1 tablet (40 mg total) by mouth once daily.    pen needle, diabetic (BD ULTRA-FINE SAGAR PEN NEEDLE) 32 gauge x 5/32" Ndle One pen needle use with insulin pen 4 times a day.  ICD-10: E11.9    polyethylene glycol (GLYCOLAX) 17 gram PwPk Take 17 g by mouth daily as needed for Constipation.    QUEtiapine (SEROQUEL) 50 MG tablet Take 1 tablet (50 mg total) by mouth every evening.    semaglutide (OZEMPIC) 0.25 mg or 0.5 mg (2 mg/3 mL) pen injector Inject 0.5 mg once weekly thereafter    HOLD at [facility]    ticagrelor (BRILINTA) 90 mg tablet Take 1 tablet (90 mg total) by mouth 2 (two) times daily.     Family History       Problem Relation (Age of Onset)    Allergy (severe) Daughter    Cancer Mother, Father    Diabetes Sister    Heart disease Mother    Hypertension Sister    Lung cancer Brother    No Known Problems Daughter          Tobacco Use    Smoking status: Never    Smokeless tobacco: Never   Substance and Sexual Activity    Alcohol use: Not Currently    Drug use: Never    Sexual activity: Not Currently     Partners: Male     ROS  Constitutional: negative for fevers or chills  Eyes: negative visual changes or eye discharge  ENT: negative for ear pain or sore throat  Respiratory: negative for shortness of breath or cough  Cardiovascular: negative for chest pain or palpitations  Gastrointestinal: negative for abdominal pain, nausea, or vomiting  Genitourinary: negative for dysuria and flank pain  Neurological: negative for headaches or dizziness  Musculoskeletal: see HPI    Objective:     Vital Signs (Most Recent):  Temp: 99.6 °F (37.6 °C) (09/24/24 1804)  Pulse: 95 (09/24/24 1804)  Resp: 18 (09/24/24 1538)  BP: 124/71 (09/24/24 1804)  SpO2: 96 % (09/24/24 1804) Vital Signs (24h Range):  Temp:  [98.1 °F (36.7 °C)-99.6 °F (37.6 °C)] 99.6 °F (37.6 °C)  Pulse:  [95-99] 95  Resp:  [18] " "18  SpO2:  [93 %-96 %] 96 %  BP: (124-142)/(71-72) 124/71     Weight: 83.5 kg (184 lb)  Height: 5' 9" (175.3 cm)  Body mass index is 27.17 kg/m².    No intake or output data in the 24 hours ending 09/24/24 2035     Ortho/SPM Exam  Physical Exam:  General:  no acute distress, WDWN  HENT:  NCAT, Bilateral ears/eyes normal  CV:  Normal pulses, color, and cap refill  Lungs:  Normal respiratory effort  Neuro: No FND, awake, alert  Psych:  Oriented to Person, Place, Time, and Situation     MSK:       BUE:  Inspection: Skin intact throughout, no swelling, no effusions, no ecchymosis   Palpation: Non-TTP throughout, no palpable abnormality.   ROM: AROM and PROM of the shoulder, elbow, wrist, and hand intact without pain.   Neuro: AIN/PIN/Radial/Median/Ulnar Nerves assessed in isolation without deficit.   SILT throughout.    Vascular: Radial artery palpated 2+. Capillary refill <3s.         LLE:  Inspection: Skin intact throughout, no swelling, no effusions, no ecchymosis   Palpation: Non-TTP throughout. No palpable abnormality.   ROM: AROM and PROM of the hip, knee, ankle, and foot intact without pain.   Neuro: TA/EHL/Gastroc/FHL assessed in isolation without deficit.   SILT throughout.    Vascular: Foot is WWP. Capillary refill <3s.         RLE:  Inspection: Medial ankle with dehisced surgical wound and visible medial hardware  Light serosanguinous drainage from wound   Mild swelling around medial ankle  No surrounding erythema or signs of cellulitis  No purulent drainage    Palpation: TTP at ankle; otherwise non-TTP throughout.  No palpable abnormality.   Compartments soft and compressible.   ROM: AROM and PROM of the hip, knee, and toes intact without pain.  AROM and PROM of ankle and foot limited 2/2 pain  No evidence of joint dislocation or abnormal laxity.   Neuro: TA/Gastroc/EHL/FHL assessed in isolation without deficit.   SILT Sa/Welsh/DP/SP/T nerve distributions   Vascular: DP and PT arteries palpated 1+. Capillary " refill <3s.             Significant Labs: All pertinent labs within the past 24 hours have been reviewed.    Significant Imaging: I have reviewed and interpreted all pertinent imaging results/findings.  X-ray right ankle showing stable orthopaedic hardware with no evidence of loosening or failure.

## 2024-09-25 NOTE — NURSING TRANSFER
Nursing Transfer Note      9/25/2024   3:55 PM    Nurse giving handoff:FRANCIS Holguin PACU  Nurse receiving handoff:FRANCIS Fierro POSS    Reason patient is being transferred: post anesthesia    Transfer To: [id number]    Transfer via bed    Transfer with  to O2, cardiac monitoring    Transported by PCT    Transfer Vital Signs:  Blood Pressure:167/73  Heart Rate:82  O2:99%-- 3LNC  Temperature:98.0  Respirations:14    Telemetry: Box Number [id number] and Rate    Order for Tele Monitor? Yes    Additional Lines: Oxygen and Benoit Catheter    4eyes on Skin: yes    Medicines sent: n/a    Any special needs or follow-up needed: n/a    Patient belongings transferred with patient: No    Chart send with patient: Yes    Notified: daughter    Patient reassessed at: 9/25/24@ 9719

## 2024-09-25 NOTE — PROGRESS NOTES
"[facility] ([address] [place] [address] Medicine  Progress Note    Patient Name: Lorena Contreras  MRN: [id number]  Patient Class: IP- Inpatient   Admission Date: 9/24/2024  Length of Stay: 0 days  Attending Physician: Daniela Abernathy MD  Primary Care Provider: Jorge Paris MD        Subjective:     Principal Problem:Wound dehiscence        HPI:  Lorena Contreras is a 74 yo F with PMHx of  DM2, Fibromyalgia, lymphoma s/p chemo, HTN, HLD, CVA, MI, CAD s/p PCIs, CKD3b, HFpEF, and anemia who presented to ED for R ankle wound dehiscence. The patient reports that on 8/14/24, she was walking into a [place] [employer] when she lost her balance, fell, and twisted her ankle. She sustained a pelvic fracture and a right pilon fracture, requiring surgery at [facility] later that day. Following the fall, she experienced urinary retention and had an indwelling catheter placed. While at the SNF, the patient reports significant progress, noting that her injuries were healing well, and she regained some mobility. Her wound was evaluated, and sutures were removed on 9/12/24. The patient was discharged home four days ago but describes her experience as "miserable," citing a lack of mobility aids provided for home use. At SNF, she had not been bearing weight on her RLE. She reports while attempting to stand on her RLE with home health her injury opened up. Since then, she has had increased swelling to her R ankle and associated generalized weakness and fatigue, decreased appetite, constipation, SOB (when anxious) and nausea. She denies fevers, chills, rhinorrhea, sore throat, cough, hemoptysis, chest pain, vomiting, diarrhea, or blood in her stool.     ED Course: AFVSS. CBC significant hgb 7.9 (down from 11.8 on month ago). No leukocytosis. CMP with Cr 2.0 (at baseline), transaminitis , , and . BG initially 232, but now 91. CRP 93 and ESR 49. Lactate 0.91. R ankle XR with stable hardware and no acute fx or " dislocation, but concerning for cellulitis. Ortho consulted and planning for I&D in AM. Given IV zofran. Placed in observation with HM.       Overview/Hospital Course:  Orthopedics consulted and patient being taken to OR today for incision and drainage and washout of right ankle surgical site for wound dehiscence. Patient with no obvious clinical signs of infection.     Interval History: Patient admitted overnight with surgical wound dehiscence of right ankle incision. Patient was admitted to [facility] in 8/2024 after a fall with closed right pilon fracture and closed right acetabular fracture. Patient had undergone ORIF of right pilon fracture on 8/14 and then had ORIF of right acetabulum on 8/16 by Dr. Davalos. Patient was discharged to [facility] for PT/OT and recovery for her surgeries and had been there until this past Monday, 9/23 when she was discharged to back home. She reports she was getting into car to go home and not sure if she put weight on ankle or not as supposed to be non weight bearing but by time she got home she noted right ankle incision had opened up and there was bloody discharge. Patient came to ED yesterday for evaluation and admitted for surgical wound dehiscence. Patient denies any pain to her right ankle or right hip area. Orthopedics consulted and plan to take to OR today for I+D of right ankle surgical site. Labs reviewed. Patient anemic on admit and Hgb 7.9 yesterday and down to 7.4 today. Patient with positive U/A on admit consistent with UTI with > 100 WBC and many bacteria and started on IV Ceftriaxone to treat UTI and urine culture sent. Creatinine 2.0 on admit and at functional baseline as has known CKD 3b and baseline Creatinine 1.7-2. Sodium improved to 134 today from 132 yesterday. AST and ALT elevated on admit. AST 92 and  with normal T. Jc 0.4 and elevated . Patient states she has been taking a lot of Tylenol for pain lately so will hold off on further  Tylenol and monitor.     Review of Systems   Constitutional:  Negative for chills and fever.   Respiratory:  Negative for cough and shortness of breath.    Cardiovascular:  Negative for chest pain, palpitations and leg swelling.   Gastrointestinal:  Negative for abdominal pain, nausea and vomiting.   Genitourinary:  Negative for difficulty urinating.   Musculoskeletal:  Negative for arthralgias, joint swelling and myalgias.   Skin:  Positive for wound (Right ankle surgical wound).   Neurological:  Positive for weakness (Generalized). Negative for dizziness and light-headedness.   Psychiatric/Behavioral:  Negative for confusion and hallucinations.      Objective:     Vital Signs (Most Recent):  Temp: 97.8 °F (36.6 °C) (09/25/24 0731)  Pulse: 97 (09/25/24 1122)  Resp: 18 (09/25/24 0731)  BP: 157/69 (09/25/24 0731)  SpO2: 93 % (09/25/24 0731) on room air Vital Signs (24h Range):  Temp:  [97.8 °F (36.6 °C)-99.6 °F (37.6 °C)] 97.8 °F (36.6 °C)  Pulse:  [73-99] 97  Resp:  [18] 18  SpO2:  [93 %-99 %] 93 %  BP: (124-165)/(65-72) 157/69     Weight: 85.4 kg (188 lb 4.4 oz)  Body mass index is 27.01 kg/m².  No intake or output data in the 24 hours ending 09/25/24 1138      Physical Exam  Vitals and nursing note reviewed.   Constitutional:       General: She is awake. She is not in acute distress.     Appearance: Normal appearance. She is normal weight. She is not ill-appearing.      Comments: Patient sitting up in bed in no distress and very comfortable. Patient awake and alert and oriented x 4.    Eyes:      Conjunctiva/sclera: Conjunctivae normal.   Cardiovascular:      Rate and Rhythm: Normal rate and regular rhythm.      Heart sounds: Normal heart sounds. No murmur heard.     No friction rub. No gallop.   Pulmonary:      Effort: Pulmonary effort is normal. No respiratory distress.      Breath sounds: Normal breath sounds. No wheezing.   Abdominal:      General: Abdomen is flat. Bowel sounds are normal. There is no  distension.      Palpations: Abdomen is soft.      Tenderness: There is no abdominal tenderness.   Musculoskeletal:      Right lower leg: No edema.      Left lower leg: No edema.      Comments: Right ankle wrapped in ACE bandage.    Skin:     General: Skin is warm.      Findings: No erythema.      Comments: Bluish coloration to groin areas related to application of Gentian violet   Neurological:      Mental Status: She is alert and oriented to person, place, and time.   Psychiatric:         Mood and Affect: Mood normal.         Behavior: Behavior normal. Behavior is cooperative.         Thought Content: Thought content normal.         Judgment: Judgment normal.           Significant Labs: A1C:   Recent Labs   Lab 07/10/24  1105   HGBA1C 8.2*     CBC:   Recent Labs   Lab 09/24/24  1622 09/25/24  0248   WBC 9.42 9.03   HGB 7.9* 7.4*   HCT 25.9* 24.1*    210     CMP:   Recent Labs   Lab 09/24/24  1622 09/25/24  0248   * 134*   K 4.5 4.2    104   CO2 22* 21*   * 126*   BUN 36* 35*   CREATININE 2.0* 2.0*   CALCIUM 8.4* 8.9   PROT 6.0 5.9*   ALBUMIN 2.2* 2.2*   BILITOT 0.4 0.4   ALKPHOS 476* 418*   * 92*   * 122*   ANIONGAP 8 9     Cardiac Markers:   Recent Labs   Lab 09/25/24  0248   BNP 1,373*       Magnesium:   Recent Labs   Lab 09/25/24  0248   MG 1.7     POCT Glucose:   Recent Labs   Lab 09/24/24  2344 09/25/24  0743   POCTGLUCOSE 91 186*     Urine Studies:   Recent Labs   Lab 09/25/24  0028   COLORU Straw   APPEARANCEUA Cloudy*   PHUR 6.0   SPECGRAV 1.010   PROTEINUA 1+*   GLUCUA Negative   KETONESU Negative   BILIRUBINUA Negative   OCCULTUA 1+*   NITRITE Negative   LEUKOCYTESUR 1+*   RBCUA 6*   WBCUA >100*   BACTERIA Many*   HYALINECASTS 0       Significant Imaging: I have reviewed all pertinent imaging results/findings within the past 24 hours.     Assessment/Plan:      * Wound dehiscence, right ankle  Closed right pilon fracture s/p ORIF on 8/14/2024  74 yo female with  history of a fall resulting in a pilon fracture to the right ankle on 8/14/24 s/p ORIF. Sutures removed on 09/12/2024 in Ortho clinic. Presented to hospital for swelling and wound dehiscence to right ankle. No leukocytosis. CRP 9.3 and ESR 49. X-ray of right ankle on admit showed Orthopedic hardware intact with no acute fracture or soft tissue swelling.    - Orthopedics consulted in ED:       - Wound irrigated and soft dressings applied in ED.  - Patient non weight bearing to right lower extremity as per Ortho.   - Multimodal pain regimen limiting narcotics with scheduled Robaxin. Avoid Tylenol as AST and ALT elevated. Oxycodone IR po prn for breakthrough pain.   - Plan for irrigation and debridement of right ankle wound in OR today (9/25/24) with Dr. Davalos and Orthopedics.  - NPO at this time.   - Antibiotics for right ankle deferred until after I&D per Ortho recs.   - Hold home Apixiban, ASA and Brilinta for surgery.     Acute cystitis without hematuria  - U/A on admit with > 100 WBC and many bacteria consistent with UTI. Urine culture sent and patient started on IV Ceftriaxone 1 gram daily to treat.   - Follow-up urine culture results.     Type 2 diabetes mellitus with stage 3b chronic kidney disease, with long-term current use of insulin  Patient's FSGs are controlled on current medication regimen. Patient on Lantus insulin 19 units in the evening to treat at home.   Last A1c reviewed-   Lab Results   Component Value Date    HGBA1C 8.2 (H) 07/10/2024     Most recent fingerstick glucose reviewed-   Recent Labs   Lab 09/24/24  2344 09/25/24  0743   POCTGLUCOSE 91 186*     Current correctional scale  Low  Maintain anti-hyperglycemic dose as follows-   Antihyperglycemics (From admission, onward)      Start     Stop Route Frequency Ordered    09/25/24 0008  insulin aspart U-100 pen 0-5 Units         -- SubQ Before meals & nightly PRN 09/24/24 2308    09/24/24 2315  insulin glargine U-100 (Lantus) pen 19 Units          -- SubQ Nightly 09/24/24 2308          Hold Oral hypoglycemics while patient is in the hospital.  Target blood sugars 140-180 in hospital.  Monitor blood sugars with meals and at bedtime in hospital.   Diabetic diet post-op.     Stage 3b chronic kidney disease  Creatine stable and at baseline BMP reviewed- noted Estimated Creatinine Clearance: 29.8 mL/min (A) (based on SCr of 2 mg/dL (H)). according to latest data. Based on current GFR, CKD stage is stage 3b. Baseline Cr 1.7-2.0. Monitor UOP and serial BMP and adjust therapy as needed. Renally dose meds. Avoid nephrotoxic medications and procedures.      Acute on chronic diastolic heart failure  Patient is identified as having Diastolic (HFpEF) heart failure that is Acute on chronic. CHF is currently uncontrolled due to Pulmonary edema/pleural effusion on CXR. CXR on admit with mild pulmonary congestion and BNP elevated at 1373 on admit. Patient with mild excerebration. Latest ECHO performed and demonstrates- Results for orders placed during the hospital encounter of 11/03/23    Echo    Interpretation Summary    Left Ventricle: The left ventricle is normal in size. Increased wall thickness. Moderate septal thickening. Normal wall motion. There is normal systolic function with a visually estimated ejection fraction of 65 - 70%. Grade I diastolic dysfunction.    Right Ventricle: Normal right ventricular cavity size. Systolic function is normal.    Left Atrium: Left atrium is severely dilated.    Aortic Valve: There is moderate stenosis. Aortic valve area by VTI is 1.02 cm². Aortic valve peak velocity is 2.59 m/s. Mean gradient is 18 mmHg. The dimensionless index is 0.32.    Mitral Valve: There is mild regurgitation.    Tricuspid Valve: There is mild regurgitation.    Pulmonary Artery: The estimated pulmonary artery systolic pressure is 35 mmHg.    IVC/SVC: Normal venous pressure at 3 mmHg.    - Start IV Lasix 40 mg BID to treat mild heart failure. Excerebration  very mild so okay to proceed with surgery as patient not hypoxic and not having any respiratory distress.   - Continue home Imdur and Toprol XL for chronic heart failure and monitor clinical status closely. Monitor on telemetry.   - Patient is off CHF pathway.    - Monitor strict Is&Os and daily weights.    - Place on fluid restriction of 1.5 L. Cardiology has not been consulted.   - Continue to stress to patient importance of self efficacy and  on diet for CHF.   - Last BNP reviewed- and noted below   Recent Labs   Lab 09/25/24  0248   BNP 1,373*       Coronary artery disease of native artery of native heart with stable angina pectoris  Patient with known CAD s/p stent placement, which is controlled. Monitor for S/Sx of angina/ACS. Continue to monitor on telemetry. Hold Aspirin and Brilinta for surgery and resume post-op.       Transaminitis  - Patient on admit labs with , , and  and normal T. Jc.  - Patient denies abdominal pain or vomiting.  - Patient on Lipitor and Tylenol at home and either medication can effect liver so will hold both meds.   - Acute hepatitis panel on admit negative.   - Trend daily CMP.  - No signs of liver failure. Do not suspect underlying liver disease and likely medication effect.     Primary hypertension  Chronic, controlled. Latest blood pressure and vitals reviewed-     Temp:  [97.8 °F (36.6 °C)-99.6 °F (37.6 °C)]   Pulse:  [73-99]   Resp:  [16-18]   BP: (124-194)/(65-81)   SpO2:  [91 %-99 %] .   Home meds for hypertension were reviewed and noted below.   Hypertension Medications               isosorbide mononitrate (IMDUR) 60 MG 24 hr tablet Take 1 tablet (60 mg total) by mouth once daily.    metoprolol succinate (TOPROL-XL) 25 MG 24 hr tablet Take 1 tablet (25 mg total) by mouth once daily.            While in the hospital, will manage blood pressure as follows; Continue home antihypertensive regimen to treat in hospital.    Will utilize p.r.n. blood  pressure medication only if patient's blood pressure greater than 180/110 and she develops symptoms such as worsening chest pain or shortness of breath.    Mixed hyperlipidemia  Chronic and controlled. Hold home Lipitor for now due elevated LFT's.     Follicular lymphoma  S/p chemo in 2010. In remission.     Iron deficiency anemia  Acute blood loss anemia  Anemia is likely due to Iron deficiency and acute blood loss from right ankle. Patient with baseline iron deficiency anemia with baseline Hgb 9-10. Hgb 7.9 on admit and patient reports blood loss from ankle at home when wound dehisced causing worsening anemia. Hgb 7.9 on admit and down to 7.4 on 9/25. Patient typed and crossed and blood consent obtained and will need blood transfusion in OR today of 1 unit of PRBCs. Most recent hemoglobin and hematocrit are listed below.  Recent Labs     09/24/24  1622 09/25/24  0248   HGB 7.9* 7.4*   HCT 25.9* 24.1*       Plan  - Monitor serial CBC: Daily  - Transfuse PRBC if patient becomes hemodynamically unstable, symptomatic or H/H drops below 7/21.  - Patient has not received any PRBC transfusions to date      VTE Risk Mitigation (From admission, onward)           Ordered     IP VTE HIGH RISK PATIENT  Once         09/24/24 2231     Reason for No Pharmacological VTE Prophylaxis  Once        Question:  Reasons:  Answer:  Already adequately anticoagulated on oral Anticoagulants    09/24/24 2231                    Discharge Planning   MIKHAIL: 9/27/2024     Code Status: Full Code   Is the patient medically ready for discharge?:     Reason for patient still in hospital (select all that apply): Patient new problem and Patient trending condition           Daniela Abernathy MD  Department of Hospital Medicine   [facility] - Surgery ([facility])

## 2024-09-25 NOTE — ASSESSMENT & PLAN NOTE
Patient with known CAD s/p stent placement, which is controlled. Monitor for S/Sx of angina/ACS. Continue to monitor on telemetry. Hold Aspirin and Brilinta for surgery and resume post-op.

## 2024-09-25 NOTE — PROGRESS NOTES
[facility]  Orthopedics  Progress Note    Patient Name: Lorena Contreras  MRN: [id number]  Admission Date: 9/24/2024  Hospital Length of Stay: 0 days  Attending Provider: Daniela Abernathy MD  Primary Care Provider: Jorge Paris MD  Follow-up For: Procedure(s) (LRB):  IRRIGATION AND DEBRIDEMENT, LOWER EXTREMITY; slider table, supine, bone foam, cysto tubing, 6L NS/dakins/peroxide, culture swabs (Right)    Post-Operative Day:    Subjective:     Principal Problem:Wound dehiscence    Principal Orthopedic Problem: as above    Interval History: Afebrile, VSS, NAEON.  Hgb 7.9 on AM labs; 2u prbc prepped/held for OR.  Pain has been reportedly well controlled.  Currently NPO for I&D right ankle this morning.          Review of patient's allergies indicates:   Allergen Reactions    Novolin 70/30 (semi-synthetic) Nausea And Vomiting     Severe vomiting on Relion 70/30    Sulfa (sulfonamide antibiotics) Anaphylaxis    Talwin [pentazocine lactate] Anaphylaxis    Victoza [liraglutide] Nausea And Vomiting    Glipizide Nausea Only    Citric acid     Codeine     Influenza virus vaccines Hives    Iodine and iodide containing products Hives    Levetiracetam Itching    Neurontin [gabapentin]      Possible associated myoclonic jerk    Rituxan [rituximab] Hives    Zoloft [sertraline] Nausea And Vomiting       Current Facility-Administered Medications   Medication    acetaminophen tablet 650 mg    albuterol-ipratropium 2.5 mg-0.5 mg/3 mL nebulizer solution 3 mL    aluminum-magnesium hydroxide-simethicone 200-200-20 mg/5 mL suspension 30 mL    aluminum-magnesium hydroxide-simethicone 200-200-20 mg/5 mL suspension 30 mL    dextrose 10% bolus 125 mL 125 mL    dextrose 10% bolus 250 mL 250 mL    FLUoxetine capsule 40 mg    glucagon (human recombinant) injection 1 mg    glucose chewable tablet 16 g    glucose chewable tablet 24 g    hydrOXYzine HCL tablet 25 mg    insulin aspart U-100 pen 0-5 Units    insulin glargine U-100  "(Lantus) pen 19 Units    isosorbide mononitrate 24 hr tablet 60 mg    melatonin tablet 6 mg    methocarbamoL tablet 500 mg    metoprolol succinate (TOPROL-XL) 24 hr tablet 25 mg    naloxone 0.4 mg/mL injection 0.02 mg    ondansetron tablet 4 mg    oxyCODONE immediate release tablet 5 mg    polyethylene glycol packet 17 g    prochlorperazine injection Soln 5 mg    QUEtiapine tablet 50 mg    simethicone chewable tablet 80 mg    sodium chloride 0.9% flush 5 mL    sucralfate 100 mg/mL suspension 1 g     Objective:     Vital Signs (Most Recent):  Temp: 98.2 °F (36.8 °C) (09/25/24 0333)  Pulse: 89 (09/25/24 0333)  Resp: 18 (09/25/24 0333)  BP: 139/65 (09/25/24 0333)  SpO2: (!) 94 % (09/25/24 0333) Vital Signs (24h Range):  Temp:  [97.9 °F (36.6 °C)-99.6 °F (37.6 °C)] 98.2 °F (36.8 °C)  Pulse:  [73-99] 89  Resp:  [18] 18  SpO2:  [93 %-99 %] 94 %  BP: (124-165)/(65-72) 139/65     Weight: 85.4 kg (188 lb 4.4 oz)  Height: 5' 10" (177.8 cm)  Body mass index is 27.01 kg/m².    No intake or output data in the 24 hours ending 09/25/24 0722     Ortho/SPM Exam  RLE:  Inspection: Medial ankle with dehisced surgical wound and visible medial hardware  Light serosanguinous drainage from wound   Mild swelling around medial ankle  No surrounding erythema or signs of cellulitis  No purulent drainage    Palpation: TTP at ankle; otherwise non-TTP throughout.  No palpable abnormality.   Compartments soft and compressible.   ROM: AROM and PROM of the hip, knee, and toes intact without pain.  AROM and PROM of ankle and foot limited 2/2 pain  No evidence of joint dislocation or abnormal laxity.   Neuro: TA/Gastroc/EHL/FHL assessed in isolation without deficit.   SILT Sa/Welsh/DP/SP/T nerve distributions   Vascular: DP and PT arteries palpated 1+. Capillary refill <3s.        Significant Labs: All pertinent labs within the past 24 hours have been reviewed.    Significant Imaging: I have reviewed all pertinent imaging " results/findings.  Assessment/Plan:     * Wound dehiscence, right ankle  Lorena Contreras is a 73 y.o. female s/p ORIF right pilon fx presents with right medial ankle wound dehiscence.  Presented afebrile with labs significant for WBC 9.03, ESR 49, CRP 9.3.  On physical exam, medial ankle surgical wound noted to be open and draining serosanguinous fluid. Incision was irrigated with 4 L of normal saline. Sterile saline soaked gauze and soft dressings were applied to wound.  Patient's extremity was elevated.     Plan:  - Admit to HM   - Abx: hold IV abx  - Wound irrigated and soft dressings applied in ED (see procedure note below)  - NWB RLE  - Multimodal pain regimen limiting narcotics  - Plan for irrigation and debridement of right ankle wound in OR today (9/25/24)  - Consents signed. Right ankle is marked.  - NPO since midnight           SEDA Meyer MD  Orthopedics  [facility]

## 2024-09-25 NOTE — ASSESSMENT & PLAN NOTE
Lorena Contreras is a 73 y.o. female s/p ORIF right pilon fx presents with right medial ankle wound dehiscence.  Presented afebrile with labs significant for WBC 9.03, ESR 49, CRP 9.3.  On physical exam, medial ankle surgical wound noted to be open and draining serosanguinous fluid. Incision was irrigated with 4 L of normal saline. Sterile saline soaked gauze and soft dressings were applied to wound.  Patient's extremity was elevated.     Plan:  - Admit to    - Abx: hold IV abx  - Wound irrigated and soft dressings applied in ED (see procedure note below)  - NWB RLE  - Multimodal pain regimen limiting narcotics  - Plan for irrigation and debridement of right ankle wound in OR today (9/25/24)  - Consents signed. Right ankle is marked.  - NPO since midnight

## 2024-09-25 NOTE — ASSESSMENT & PLAN NOTE
Patient's FSGs are controlled on current medication regimen. Patient on Lantus insulin 19 units in the evening to treat at home.   Last A1c reviewed-   Lab Results   Component Value Date    HGBA1C 8.2 (H) 07/10/2024     Most recent fingerstick glucose reviewed-   Recent Labs   Lab 09/24/24  2344 09/25/24  0743   POCTGLUCOSE 91 186*     Current correctional scale  Low  Maintain anti-hyperglycemic dose as follows-   Antihyperglycemics (From admission, onward)      Start     Stop Route Frequency Ordered    09/25/24 0008  insulin aspart U-100 pen 0-5 Units         -- SubQ Before meals & nightly PRN 09/24/24 2308    09/24/24 2315  insulin glargine U-100 (Lantus) pen 19 Units         -- SubQ Nightly 09/24/24 2308          Hold Oral hypoglycemics while patient is in the hospital.  Target blood sugars 140-180 in hospital.  Monitor blood sugars with meals and at bedtime in hospital.   Diabetic diet post-op.

## 2024-09-26 LAB
ABO + RH BLD: NORMAL
ALBUMIN SERPL BCP-MCNC: 2.2 G/DL (ref 3.5–5.2)
ALP SERPL-CCNC: 377 U/L (ref 55–135)
ALT SERPL W/O P-5'-P-CCNC: 85 U/L (ref 10–44)
ANION GAP SERPL CALC-SCNC: 7 MMOL/L (ref 8–16)
AST SERPL-CCNC: 60 U/L (ref 10–40)
BILIRUB SERPL-MCNC: 0.4 MG/DL (ref 0.1–1)
BLD GP AB SCN CELLS X3 SERPL QL: NORMAL
BUN SERPL-MCNC: 32 MG/DL (ref 8–23)
CALCIUM SERPL-MCNC: 8.5 MG/DL (ref 8.7–10.5)
CHLORIDE SERPL-SCNC: 105 MMOL/L (ref 95–110)
CO2 SERPL-SCNC: 26 MMOL/L (ref 23–29)
CREAT SERPL-MCNC: 1.9 MG/DL (ref 0.5–1.4)
ERYTHROCYTE [DISTWIDTH] IN BLOOD BY AUTOMATED COUNT: 15.3 % (ref 11.5–14.5)
EST. GFR  (NO RACE VARIABLE): 27.5 ML/MIN/1.73 M^2
GLUCOSE SERPL-MCNC: 93 MG/DL (ref 70–110)
HCT VFR BLD AUTO: 23.5 % (ref 37–48.5)
HGB BLD-MCNC: 7.3 G/DL (ref 12–16)
MAGNESIUM SERPL-MCNC: 1.7 MG/DL (ref 1.6–2.6)
MCH RBC QN AUTO: 29.3 PG (ref 27–31)
MCHC RBC AUTO-ENTMCNC: 31.1 G/DL (ref 32–36)
MCV RBC AUTO: 94 FL (ref 82–98)
PLATELET # BLD AUTO: 209 K/UL (ref 150–450)
PMV BLD AUTO: 12 FL (ref 9.2–12.9)
POCT GLUCOSE: 104 MG/DL (ref 70–110)
POCT GLUCOSE: 138 MG/DL (ref 70–110)
POCT GLUCOSE: 162 MG/DL (ref 70–110)
POCT GLUCOSE: 95 MG/DL (ref 70–110)
POTASSIUM SERPL-SCNC: 4.2 MMOL/L (ref 3.5–5.1)
PROT SERPL-MCNC: 5.9 G/DL (ref 6–8.4)
RBC # BLD AUTO: 2.49 M/UL (ref 4–5.4)
SODIUM SERPL-SCNC: 138 MMOL/L (ref 136–145)
SPECIMEN OUTDATE: NORMAL
VANCOMYCIN SERPL-MCNC: 14.7 UG/ML
WBC # BLD AUTO: 7.79 K/UL (ref 3.9–12.7)

## 2024-09-26 PROCEDURE — 63600175 PHARM REV CODE 636 W HCPCS: Mod: HCNC | Performed by: INTERNAL MEDICINE

## 2024-09-26 PROCEDURE — 97165 OT EVAL LOW COMPLEX 30 MIN: CPT | Mod: HCNC

## 2024-09-26 PROCEDURE — 97530 THERAPEUTIC ACTIVITIES: CPT | Mod: HCNC

## 2024-09-26 PROCEDURE — 63600175 PHARM REV CODE 636 W HCPCS: Mod: HCNC | Performed by: HOSPITALIST

## 2024-09-26 PROCEDURE — 85027 COMPLETE CBC AUTOMATED: CPT | Mod: HCNC

## 2024-09-26 PROCEDURE — 97162 PT EVAL MOD COMPLEX 30 MIN: CPT | Mod: HCNC

## 2024-09-26 PROCEDURE — 86900 BLOOD TYPING SEROLOGIC ABO: CPT | Mod: HCNC | Performed by: INTERNAL MEDICINE

## 2024-09-26 PROCEDURE — 86901 BLOOD TYPING SEROLOGIC RH(D): CPT | Mod: HCNC | Performed by: INTERNAL MEDICINE

## 2024-09-26 PROCEDURE — 86850 RBC ANTIBODY SCREEN: CPT | Mod: HCNC | Performed by: INTERNAL MEDICINE

## 2024-09-26 PROCEDURE — 25000003 PHARM REV CODE 250: Mod: HCNC

## 2024-09-26 PROCEDURE — 36415 COLL VENOUS BLD VENIPUNCTURE: CPT | Mod: HCNC

## 2024-09-26 PROCEDURE — 80053 COMPREHEN METABOLIC PANEL: CPT | Mod: HCNC

## 2024-09-26 PROCEDURE — 83735 ASSAY OF MAGNESIUM: CPT | Mod: HCNC

## 2024-09-26 PROCEDURE — 80202 ASSAY OF VANCOMYCIN: CPT | Mod: HCNC | Performed by: HOSPITALIST

## 2024-09-26 PROCEDURE — 25000003 PHARM REV CODE 250: Mod: HCNC | Performed by: HOSPITALIST

## 2024-09-26 PROCEDURE — 25000003 PHARM REV CODE 250: Mod: HCNC | Performed by: INTERNAL MEDICINE

## 2024-09-26 PROCEDURE — 97112 NEUROMUSCULAR REEDUCATION: CPT | Mod: HCNC

## 2024-09-26 PROCEDURE — 36415 COLL VENOUS BLD VENIPUNCTURE: CPT | Mod: HCNC | Performed by: HOSPITALIST

## 2024-09-26 PROCEDURE — 21400001 HC TELEMETRY ROOM: Mod: HCNC

## 2024-09-26 RX ORDER — HYDRALAZINE HYDROCHLORIDE 50 MG/1
50 TABLET, FILM COATED ORAL EVERY 8 HOURS
Status: DISCONTINUED | OUTPATIENT
Start: 2024-09-26 | End: 2024-09-27

## 2024-09-26 RX ADMIN — METHOCARBAMOL 500 MG: 500 TABLET ORAL at 05:09

## 2024-09-26 RX ADMIN — METOPROLOL SUCCINATE 25 MG: 25 TABLET, EXTENDED RELEASE ORAL at 08:09

## 2024-09-26 RX ADMIN — SUCRALFATE 1 G: 1 SUSPENSION ORAL at 06:09

## 2024-09-26 RX ADMIN — ASPIRIN 81 MG CHEWABLE TABLET 81 MG: 81 TABLET CHEWABLE at 08:09

## 2024-09-26 RX ADMIN — OXYCODONE HYDROCHLORIDE 5 MG: 5 TABLET ORAL at 10:09

## 2024-09-26 RX ADMIN — METHOCARBAMOL 500 MG: 500 TABLET ORAL at 08:09

## 2024-09-26 RX ADMIN — SUCRALFATE 1 G: 1 SUSPENSION ORAL at 01:09

## 2024-09-26 RX ADMIN — FUROSEMIDE 40 MG: 10 INJECTION, SOLUTION INTRAVENOUS at 08:09

## 2024-09-26 RX ADMIN — ALUMINUM HYDROXIDE, MAGNESIUM HYDROXIDE, AND SIMETHICONE 30 ML: 1200; 120; 1200 SUSPENSION ORAL at 09:09

## 2024-09-26 RX ADMIN — VANCOMYCIN HYDROCHLORIDE 750 MG: 750 INJECTION, POWDER, LYOPHILIZED, FOR SOLUTION INTRAVENOUS at 08:09

## 2024-09-26 RX ADMIN — HYDRALAZINE HYDROCHLORIDE 50 MG: 50 TABLET ORAL at 01:09

## 2024-09-26 RX ADMIN — HYDRALAZINE HYDROCHLORIDE 50 MG: 50 TABLET ORAL at 10:09

## 2024-09-26 RX ADMIN — METHOCARBAMOL 500 MG: 500 TABLET ORAL at 01:09

## 2024-09-26 RX ADMIN — FLUOXETINE HYDROCHLORIDE 40 MG: 20 CAPSULE ORAL at 08:09

## 2024-09-26 RX ADMIN — INSULIN GLARGINE 19 UNITS: 100 INJECTION, SOLUTION SUBCUTANEOUS at 09:09

## 2024-09-26 RX ADMIN — HYDROXYZINE HYDROCHLORIDE 25 MG: 25 TABLET, FILM COATED ORAL at 01:09

## 2024-09-26 RX ADMIN — SUCRALFATE 1 G: 1 SUSPENSION ORAL at 05:09

## 2024-09-26 RX ADMIN — HYDROXYZINE HYDROCHLORIDE 25 MG: 25 TABLET, FILM COATED ORAL at 10:09

## 2024-09-26 RX ADMIN — OXYCODONE HYDROCHLORIDE 5 MG: 5 TABLET ORAL at 07:09

## 2024-09-26 RX ADMIN — OXYCODONE HYDROCHLORIDE 5 MG: 5 TABLET ORAL at 11:09

## 2024-09-26 RX ADMIN — ALUMINUM HYDROXIDE, MAGNESIUM HYDROXIDE, AND SIMETHICONE 30 ML: 1200; 120; 1200 SUSPENSION ORAL at 05:09

## 2024-09-26 RX ADMIN — FUROSEMIDE 40 MG: 10 INJECTION, SOLUTION INTRAVENOUS at 09:09

## 2024-09-26 RX ADMIN — CEFTRIAXONE 1 G: 1 INJECTION, POWDER, FOR SOLUTION INTRAMUSCULAR; INTRAVENOUS at 10:09

## 2024-09-26 RX ADMIN — HYDRALAZINE HYDROCHLORIDE 50 MG: 50 TABLET ORAL at 09:09

## 2024-09-26 RX ADMIN — ISOSORBIDE MONONITRATE 60 MG: 30 TABLET, EXTENDED RELEASE ORAL at 08:09

## 2024-09-26 RX ADMIN — ALUMINUM HYDROXIDE, MAGNESIUM HYDROXIDE, AND SIMETHICONE 30 ML: 1200; 120; 1200 SUSPENSION ORAL at 06:09

## 2024-09-26 RX ADMIN — QUETIAPINE FUMARATE 50 MG: 25 TABLET ORAL at 09:09

## 2024-09-26 RX ADMIN — SUCRALFATE 1 G: 1 SUSPENSION ORAL at 11:09

## 2024-09-26 RX ADMIN — ALUMINUM HYDROXIDE, MAGNESIUM HYDROXIDE, AND SIMETHICONE 30 ML: 1200; 120; 1200 SUSPENSION ORAL at 10:09

## 2024-09-26 RX ADMIN — SUCRALFATE 1 G: 1 SUSPENSION ORAL at 12:09

## 2024-09-26 RX ADMIN — VANCOMYCIN HYDROCHLORIDE 1000 MG: 1 INJECTION, POWDER, LYOPHILIZED, FOR SOLUTION INTRAVENOUS at 11:09

## 2024-09-26 RX ADMIN — METHOCARBAMOL 500 MG: 500 TABLET ORAL at 09:09

## 2024-09-26 NOTE — OP NOTE
OPERATIVE NOTE    DATE OF PROCEDURE:  09/25/2024    PREOPERATIVE DIAGNOSIS:   Right ankle pilon fracture, subsequent encounter with routine healing   Right ankle medial surgical wound dehiscence  Poorly controlled diabetes    POSTOPERATIVE DIAGNOSIS:   Right ankle pilon fracture, subsequent encounter with routine healing   Right ankle medial surgical wound dehiscence  Poorly controlled diabetes    PROCEDURE:   Fasciocutaneous flap right medial ankle  Excisional debridement of left ankle medial wound including skin, subcutaneous tissue, fascia, bone.  12 x 3 cm    SURGEON:   Neto Davalos MD    ASSISTANT:    Elmer Marroquin MD    ANESTHESIA:   Regional block + sedation    EBL:    75mL    COMPLICATIONS:  none    IMPLANTS:   Vancomycin 1g  Tobramycin 1.2g    SPECIMENS:   Tissue culture  Bone biopsy    INDICATIONS FOR PROCEDURE:  73-year-old female, diabetes, neuropathy, prior heart attack and stroke with fibromyalgia, restless leg syndrome and multiple medication allergies  Ground level fall 08/13/2024  Right ankle pilon fracture  Right both column acetabular fracture     08/14/2024 - ORIF right ankle pilon fracture     08/16/2024 - ORIF right both column acetabular fracture     Right ankle wound dehiscence 09/22/2024     Presented to our emergency department 09/23/2024, seen by Orthopedics     At this time current cause of the wound dehiscence it was unknown it could be due to multiple factors.  Possible contributing factors include   Poorly controlled diabetes with poor wound healing   Trauma to wound causing opening of the incision   Deep infection     Counseled patient on non operative versus operative management.  Non operative management consist of wound care, potentially antibiotics.  I have concern that if this represents a deep infection not further evaluating the wound surgically could result in further infection, wound issues, osteomyelitis, loss of limb.  Recommended operative intervention in the  form of debridement irrigation, exploration of the wound.  Depending on intraoperative findings, whether or not the wound is superficial or deep, or if there are signs of infection, or exposed hardware we would have to adjust our plan accordingly.  We at the very least we will perform the debridement, irrigation.  We may be able to close the wound, we may need to place a wound VAC for ongoing wound care.  If we are unable to close this wound, or breaks down again, we will discuss the case with Plastic surgery for consideration of the free flap.  Given her multiple medical comorbidities, especially poorly controlled diabetes, she may not be a good free flap candidate, and a below-knee amputation maybe her only surgical option.     The risks, benefits, and alternatives to surgery were discussed with the patient and/or family.    Specific risks discussed included, but were not limited to:  Need for multiple staged surgeries, damage to nearby structures, including neurovascular structures leading to loss of function or loss of limb, bleeding, need for blood transfusion, pain, stiffness, scarring, numbness, tingling, weakness, compartment syndrome, malunion/nonunion, hardware failure, hardware prominence, infection, need for multiple staged procedures, prolonged antibiotics, iatrogenic fracture, heterotopic ossification, arthritis, a variety of medical complications including but not limited to heart attack, stroke, deep venous thrombosis, pulmonary embolism, prolonged hospitalization, prolonged intubation, and death.   Patient and/or family expressed an understanding and desires to proceed with surgery.   All questions were answered.  No guarantees were implied or stated.  Informed consent was obtained.          OPERATIVE PROCEDURE:  Patient met in the preoperative hold area and the correct site and side of surgery being the right lower extremity were marked and verified.  Regional block placed by anesthesia.  Patient  brought back to the operative suite.  Patient was sedated.  Patient transferred over to operative table.  Placed in supine position. All bony prominences were appropriately padded.  Antibiotics were held until cultures were taken and then 2 g Ancef and 1 g vancomycin were given.  The right lower extremity was then prepped and draped in normal sterile fashion.    Time-out was performed verifying the correct patient, site/side of surgery, surgical consent, radiographs as applicable, preop antibiotics, necessary equipment, anticipated blood loss, length of procedure, postoperative disposition.      We started with the assessment of her medial ankle wound.  There was no gross purulence.  The entirety of the incision had dehisced.  There was some devitalized eschar on some areas of the incision.  The plate was not visible within the incision at this point.    We then turned our attention to excisional debridement and irrigation of the right medial ankle wound.  The devitalized skin edges were excisionally debrided sharply with a scalpel until healthy bleeding tissue was obtained.  We then turned our attention to the subcutaneous tissue where a similar excisional debridement was performed sharply with a scalpel and rongeur as needed.  Upon mobilization of the anterior medial tissues the plate was apparent within the wound.  There was no gross purulence.  We took cultures from the subcutaneous tissue area and some of the tissue overlying the plate.  The periosteum and fascia over the majority of the plate had healed.  We explored posteriorly and noted there to be no defect nor any purulence tracking posteriorly.  Decision was made to expose the entirety of the plate to ensure appropriate debridement and removal of any contaminant or deep infection that might be present.  We sharply incised overlying the plate dividing the fascia and elevating soft tissue planes until we identified the proximal aspect of the plate.  There  was no areas of purulence noted.  The hardware appeared to be well fixed.  Further cultures were taken.  There was some loose bone from the prior fracture of the medial malleolus in his was sharply excisionally debrided with a rongeur.  Some was sent for culture others for pathology.  Once we were satisfied with the excisional debridement we did the following     Irrigated pulse lavage saline   Irrigated Bactisure   Irrigated saline   Dilute Betadine soak x3 minutes.  Limb re-prepped with Betadine.  Surgical team changed gloves new down towels were placed.    Irrigated saline   Irrigated peroxide   Irrigated saline.    We then turned our attention to closure of the wound.  Due to the wound dehiscence, poor tissue quality and the excisional debridement the tissue was not able to be closed primarily with a simple closure.  We then turned our attention to mobilization of full-thickness fasciocutaneous flaps.  This was 1st done to cover the plate with this much deep tissue and periosteum as possible.  We elevated the periosteum and soft tissues anteriorly and posterior to the plate.  Prior to closing this layer we placed 1 g vancomycin and 1.2 g tobramycin deep.  We are then able to close the majority of the periosteum overlying the medial plate, with the exception of the very distal most aspect.  We then mobilized the deep tissue once again full fasciocutaneous flaps both anteriorly and posteriorly.  This allowed us to obtain a reasonable deep soft tissue closure, however the deep layer could not fully be closed over the distal aspect of the wound.  We then utilized retention type sutures with Prolene #1 in a vertical mattress fashion on the skin obtaining larger bites of tissue, and worked from the corners of the wound and we are able to close the skin without significant tension.  In order to better approximate the intervening skin edges we used several 3-0 nylon stitches in between the Prolenes to complete our  closure.  An incisional wound VAC was placed    At the conclusion of procedure the patient had soft and compressible compartments, brisk cap refill, palpable DP/PT pulse in the operative extremity.    Prior to final closure all counts were confirmed to be correct.  Patient tolerated the procedure well without any complications, was awoken from anesthesia, transferred PACU for further recovery.    POSTOPERATIVE PLAN:  73-year-old female, diabetes, neuropathy, prior heart attack and stroke with fibromyalgia, restless leg syndrome and multiple medication allergies  Ground level fall 08/13/2024  Right ankle pilon fracture  Right both column acetabular fracture     08/14/2024 - ORIF right ankle pilon fracture     08/16/2024 - ORIF right both column acetabular fracture     Right ankle wound dehiscence 09/22/2024 09/25/2024 - I&D right ankle medial wound, fasciocutaneous flap closure    Remove incisional wound VAC 1 week postop, eval incision, consider placement another incisional wound VAC  Leave sutures in place at least 3 weeks  If this wound breaks down patient will likely need another I and D, at that time we would consider removal of the medial plate, and Plastic surgery consultation for soft tissue coverage.    Antibiotics per Infectious Disease, okay with p.o. antibiotics, likely for 6 weeks  Strict elevation of the leg to aid in edema control and soft tissue healing  Nonweightbearing right lower extremity until 11/01/2024  Range of motion as tolerated right lower extremity  Medical management per primary team, recommend tight glucose control  eliquis x6 weeks for DVT prophylaxis    X-rays at subsequent followups starting at 3 weeks postop:  Right ankle  Right pelvis    Follow-up postop  1 week - remove incisional wound VAC  2 weeks - wound check  3 weeks - suture removal  6 weeks, 3 months, 6 months, 1 year    =====================  Neto Davalos MD  Orthopaedic Surgery

## 2024-09-26 NOTE — SUBJECTIVE & OBJECTIVE
Interval History: known to me well from previous admit and patient also remembers me from then and report was doing well until she was home 1 day from [facility] and may have accidentally put a little weight on it but had fall and then ankle wound had dehiscence and came to the ER and went for I and D last night, op note pending but she said she is still NWB (and initially was to be NWB for 10 weeks until at least oct 26th) so PT/OT reorered for NWB to RLE given this. Was to be on eliquis for 10 weeks as well until oct 26th and had on brillinta and eliquis and her asa was held until oct 26th but was placed on asa on admit and eliquis held. Will f/u with ortho If any other surgeries planned and if not will resume eliquis and switch back to brillinta over weekend from ASA. Hg 7.3 with acute blood loss and in 7's on admit from 11 previous so watch closely to ensure no bleeding signs as may require tx tomrorow if stays on lower 7's. Cr improving to 1.9 with baseline closer to 1.7 to 1.9 and was 2.0 on admit. had rocephin started for UTI + vanc to cover wound as well as rocphin for this, vanc will also cover her recent utis with a semi resistant ecoil and e faecalis and GNR in urine now. Will f.u wound CX now and may likely need ID consult this weekend given this. Liver enzyems mildly up on admit and had similar ternd last admit, she reports taking extra tylenol at home so holding now with improving enzymes. In good spirits, looks great and bettter than last admit. Hoping for some coffee this AM as always enjoys her morning coffee. Daughter coming to visit later today.    Review of Systems   Constitutional:  Negative for chills and fever.   Respiratory:  Negative for cough and shortness of breath.    Cardiovascular:  Negative for chest pain, palpitations and leg swelling.   Gastrointestinal:  Negative for abdominal pain, nausea and vomiting.   Genitourinary:  Negative for difficulty urinating.   Musculoskeletal:   Negative for arthralgias, joint swelling and myalgias.   Skin:  Positive for wound (Right ankle surgical wound).   Neurological:  Positive for weakness (Generalized). Negative for dizziness and light-headedness.   Psychiatric/Behavioral:  Negative for confusion and hallucinations.      Objective:     Vital Signs (Most Recent):  Temp: 97.8 °F (36.6 °C) (09/25/24 0731)  Pulse: 97 (09/25/24 1122)  Resp: 18 (09/25/24 0731)  BP: 157/69 (09/25/24 0731)  SpO2: 93 % (09/25/24 0731) on room air Vital Signs (24h Range):  Temp:  [97.9 °F (36.6 °C)-98.6 °F (37 °C)] 97.9 °F (36.6 °C)  Pulse:  [84-99] 93  Resp:  [15-20] 16  SpO2:  [89 %-99 %] 98 %  BP: (138-194)/(63-81) 189/79     Weight: 85.4 kg (188 lb 4.4 oz)  Body mass index is 27.01 kg/m².    Intake/Output Summary (Last 24 hours) at 9/26/2024 0929  Last data filed at 9/26/2024 0456  Gross per 24 hour   Intake 350 ml   Output 2100 ml   Net -1750 ml         Physical Exam  Vitals and nursing note reviewed.   Constitutional:       General: She is awake. She is not in acute distress.     Appearance: Normal appearance. She is normal weight. She is not ill-appearing.      Comments: Patient sitting up in bed in no distress and very comfortable. Patient awake and alert and oriented x 4.    Eyes:      Conjunctiva/sclera: Conjunctivae normal.   Cardiovascular:      Rate and Rhythm: Normal rate and regular rhythm.      Heart sounds: Normal heart sounds. No murmur heard.     No friction rub. No gallop.   Pulmonary:      Effort: Pulmonary effort is normal. No respiratory distress.      Breath sounds: Normal breath sounds. No wheezing.   Abdominal:      General: Abdomen is flat. Bowel sounds are normal. There is no distension.      Palpations: Abdomen is soft.      Tenderness: There is no abdominal tenderness.   Musculoskeletal:      Right lower leg: No edema.      Left lower leg: No edema.      Comments: Right ankle wound vac   Skin:     General: Skin is warm.      Findings: No erythema.       Comments: Bluish coloration to groin areas related to application of Gentian violet   Neurological:      Mental Status: She is alert and oriented to person, place, and time.   Psychiatric:         Mood and Affect: Mood normal.         Behavior: Behavior normal. Behavior is cooperative.         Thought Content: Thought content normal.         Judgment: Judgment normal.             Significant Labs: A1C:   Recent Labs   Lab 07/10/24  1105   HGBA1C 8.2*     CBC:   Recent Labs   Lab 09/24/24  1622 09/25/24  0248 09/26/24  0447   WBC 9.42 9.03 7.79   HGB 7.9* 7.4* 7.3*   HCT 25.9* 24.1* 23.5*    210 209     CMP:   Recent Labs   Lab 09/24/24  1622 09/25/24  0248 09/26/24  0447   * 134* 138   K 4.5 4.2 4.2    104 105   CO2 22* 21* 26   * 126* 93   BUN 36* 35* 32*   CREATININE 2.0* 2.0* 1.9*   CALCIUM 8.4* 8.9 8.5*   PROT 6.0 5.9* 5.9*   ALBUMIN 2.2* 2.2* 2.2*   BILITOT 0.4 0.4 0.4   ALKPHOS 476* 418* 377*   * 92* 60*   * 122* 85*   ANIONGAP 8 9 7*     Cardiac Markers:   Recent Labs   Lab 09/25/24  0248   BNP 1,373*       Magnesium:   Recent Labs   Lab 09/25/24  0248 09/26/24  0447   MG 1.7 1.7     POCT Glucose:   Recent Labs   Lab 09/25/24  1500 09/25/24  2118 09/26/24  0742   POCTGLUCOSE 164* 142* 95     Urine Studies:   Recent Labs   Lab 09/25/24  0028   COLORU Straw   APPEARANCEUA Cloudy*   PHUR 6.0   SPECGRAV 1.010   PROTEINUA 1+*   GLUCUA Negative   KETONESU Negative   BILIRUBINUA Negative   OCCULTUA 1+*   NITRITE Negative   LEUKOCYTESUR 1+*   RBCUA 6*   WBCUA >100*   BACTERIA Many*   HYALINECASTS 0       Significant Imaging: I have reviewed all pertinent imaging results/findings within the past 24 hours.

## 2024-09-26 NOTE — PLAN OF CARE
[facility] - Surgery  Initial Discharge Assessment       Primary Care Provider: Jorge Paris MD    Admission Diagnosis: Preop testing [Z01.818]  Chest pain [R07.9]  Postoperative dehiscence of skin wound, initial encounter [T81.31XA]    Admission Date: 9/24/2024  Expected Discharge Date: 9/28/2024         Payor: [employer] MANAGED MEDICARE / Plan: [employer] MEDICARE HMO / Product Type: Capitation /     Extended Emergency Contact Information  Primary Emergency Contact: RADHA MUÑIZ  Mobile Phone: [phone]  Relation: Daughter  Preferred language: English   needed? No  Secondary Emergency Contact: JAY JAY SMITH  Mobile Phone: [phone]  Relation: Daughter  Preferred language: English   needed? No    Discharge Plan A: Home with family, Home Health  Discharge Plan B: Skilled Nursing Facility      [facility] [address]  [address]  [address]  [place] [address]  Phone: [phone] Fax: [phone]    [facility] [address] [facility]  [address]   [facility] [address]  Phone: [phone] Fax: [phone]      Initial Assessment (most recent)       Adult Discharge Assessment - 09/26/24 1404          Discharge Assessment    Assessment Type Discharge Planning Assessment     Confirmed/corrected address, phone number and insurance Yes     Confirmed Demographics Correct on Facesheet     Source of Information patient     Communicated MIKHAIL with patient/caregiver Yes     People in Home grandchild(daniel)     Do you expect to return to your current living situation? Yes     Do you have help at home or someone to help you manage your care at home? Yes     Who are your caregiver(s) and their phone number(s)? daughters and grandson     Prior to hospitilization cognitive status: Alert/Oriented     Current cognitive status: Alert/Oriented     Home Layout Able to live on 1st floor     Equipment Currently Used at Home cane, quad;cane, straight;bedside  commode;rollator;shower chair;slide board;walker, rolling;wheelchair     Do you currently have service(s) that help you manage your care at home? No     Do you take prescription medications? Yes     Do you have prescription coverage? Yes     Do you have any problems affording any of your prescribed medications? No     Is the patient taking medications as prescribed? yes     How do you get to doctors appointments? family or friend will provide     Are you on dialysis? No     Do you take coumadin? No     Discharge Plan A Home with family;Home Health     Discharge Plan B Skilled Nursing Facility     DME Needed Upon Discharge  none     Discharge Plan discussed with: Patient                   Patient lives with grandson and his girlfriend in a single story home with entry ramp.     Patient states she has a horrible experience at [facility] and does not wish to dc to a SNF. She states she is in ins co pay days and cannot afford a SNF stay. Patient adamant that she wants to be dc'd to her home with  services. States she has daughters and sister who will take care of her at home.

## 2024-09-26 NOTE — PT/OT/SLP EVAL
Occupational Therapy   Co-Evaluation and Treatment    Name: Lorena Contreras  MRN: [id number]  Admitting Diagnosis: Wound dehiscence  Recent Surgery: Procedure(s) (LRB):  IRRIGATION AND DEBRIDEMENT, LOWER EXTREMITY; slider table, supine, bone foam, cysto tubing, 6L NS/dakins/peroxide, culture swabs (Right)  APPLICATION, WOUND VAC (Right)  DEBRIDEMENT, LOCAL FLAP (Right) 1 Day Post-Op    Recommendations:     Discharge Recommendations: Moderate Intensity Therapy  Discharge Equipment Recommendations:  none  Barriers to discharge:  None    Assessment:     Lorena Contreras is a 73 y.o. female with a medical diagnosis of Wound dehiscence.  She presents with poor WB adherence to NWB precautions at time of evaluation assessment. However she tolerated 1 trial of sit to stand and scooting at EOB. Performance deficits affecting function: weakness, impaired endurance, impaired self care skills, impaired functional mobility, gait instability, decreased lower extremity function, decreased safety awareness, pain.      Rehab Prognosis: Good; patient would benefit from acute skilled OT services to address these deficits and reach maximum level of function.       Plan:     Patient to be seen 3 x/week to address the above listed problems via self-care/home management, therapeutic exercises, neuromuscular re-education, therapeutic activities  Plan of Care Expires: 10/26/24  Plan of Care Reviewed with: patient, daughter    Subjective     Chief Complaint: None   Patient/Family Comments/goals: Pt.reports she can scoot good with a slide board    Occupational Profile:  Living Environment: pt lives alone in [facility] with 4-5 NAMAN B HR (wide) and ramp entrance. Pt has tub/shower combo with TTB in place    - just d/c from [facility] and went home for 1 day before this admission  Previous level of function: ADLs assistance and sliding board transfer 1 person assist; j   Equipment Used at Home: cane, quad, cane, straight, slide board,  wheelchair, shower chair, bedside commode, walker, rolling (drop arm w/c and BSC)  Assistance upon Discharge: Limited family     Pain/Comfort:  Pain Rating 1: 0/10  Location - Side 1: Right  Location 1: hip  Pain Addressed 1: Reposition, Distraction    Patients cultural, spiritual, [employer] conflicts given the current situation: no    Objective:     Communicated with: Nurse prior to session.  Patient found HOB elevated with peripheral IV, telemetry, wound vac, chong catheter, oxygen upon OT entry to room.    General Precautions: Standard, fall  Orthopedic Precautions: RLE non weight bearing    - MD d/c previous orders with NWB and put in new orders with new WB after eval: 80% weight bearing x 2 weeks rle ankle fx wash out    Braces: N/A  Respiratory Status: Room air    Occupational Performance:    Bed Mobility:    Patient completed Supine to Sit with contact guard assistance  Patient completed Sit to Supine with moderate assistance  Pt. Completed anterior with contact guard assistance B UE support  and lateral scooting : total assistance and knee block; poor hip clearance at  EOB with     Functional Mobility/Transfers:  Patient completed Sit <> Stand Transfer with total assistance and of 1 persons  with  rolling walker 1 trial at EOB, no hip clearance achieved    Activities of Daily Living:  Feeding:  set up (A) lunch tray and coffee    Cognitive/Visual Perceptual:  Cognitive/Psychosocial Skills:     -       Oriented to: Person, Place, Time, and Situation   -       Follows Commands/attention:Follows multistep  commands  -       Communication: clear/fluent  -       Memory: No Deficits noted  -       Safety awareness/insight to disability: impaired   -       Mood/Affect/Coping skills/emotional control: Appropriate to situation    Physical Exam:  Balance:   Static Sitting: SBA   Dynamic Sitting: SBA  Static Standing: NA  Dynamic Standing: NA  Upper Extremity Range of Motion:     -       Right Upper Extremity: WFL  -        Left Upper Extremity: WFL  Upper Extremity Strength:    -       Right Upper Extremity: WFL  -       Left Upper Extremity: WFL   Strength:    -       Right Upper Extremity: WFL  -       Left Upper Extremity: WFL    AMPAC 6 Click ADL:  AMPAC Total Score: 17    Treatment & Education:  Pt.educated and reviewed WB precautions  Pt educated on role of occupational therapy, POC, and safety during ADLs and functional mobility. Pt and OT discussed importance of safe, continued mobility to optimize daily living skills. Pt verbalized understanding. Pt given instruction to call for medical staff/nurse for assistance.   PT present for coeval due to pt's multiple medical comorbidities and functional/cognition deficits requiring two skilled therapists to appropriately progress pt's musculoskeletal strength, neuromuscular control, and endurance while taking into consideration medical acuity and pt safety.    Patient left HOB elevated with all lines intact and call button in reach and daughter present      GOALS:   Multidisciplinary Problems       Occupational Therapy Goals          Problem: Occupational Therapy    Goal Priority Disciplines Outcome Interventions   Occupational Therapy Goal     OT, PT/OT Progressing    Description: Goals to be met by: 10/26/24     Patient will increase functional independence with ADLs by performing:    LE Dressing with Supervision.  Grooming while standing at sink with Supervision.  T/f with slideboard with CGA  Toileting from toilet with Supervision for hygiene and clothing management.   Supine to sit with Supervision.                         History:     Past Medical History:   Diagnosis Date    Allergy     Altered mental status 06/19/2022    DYSARTHRIA, SPASTIC MOVEMENTS & DIFFICULTY SWALLOWING    Anemia     Anxiety     Arthritis     Cataract     both removed    Colon polyps     Coronary artery disease     Depression     Diabetes mellitus, type II     Disorder of kidney and ureter      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/2023    Fibromyalgia     Follicular lymphoma     GERD (gastroesophageal reflux disease)     HTN (hypertension)     Hyperlipidemia     MI (myocardial infarction) 03/2019    Personal history of colonic polyps     Restless leg syndrome     Stroke          Past Surgical History:   Procedure Laterality Date    APPLICATION OF WOUND VACUUM-ASSISTED CLOSURE DEVICE Right 9/25/2024    Procedure: APPLICATION, WOUND VAC;  Surgeon: eNto Davalos MD;  Location: [facility] OR [address] [place] [address];  Service: Orthopedics;  Laterality: Right;    COLONOSCOPY  11/07/2012    Colon polyp found; repeat in 5 years    DEBRIDEMENT OF LOCAL FLAP Right 9/25/2024    Procedure: DEBRIDEMENT, LOCAL FLAP;  Surgeon: Neto Davalos MD;  Location: [facility] OR [address] [place] [address];  Service: Orthopedics;  Laterality: Right;    ELBOW SURGERY Right 2015    dislocation repair     ESOPHAGOGASTRODUODENOSCOPY  11/07/2012    atrophic gastritis, H pylori testing negative    INCISION AND DRAINAGE FOOT Right 6/2/2021    Procedure: INCISION AND DRAINAGE, FOOT, bone biopsy;  Surgeon: Quiana Penn DPM;  Location: [facility] OR;  Service: Podiatry;  Laterality: Right;    IRRIGATION AND DEBRIDEMENT OF LOWER EXTREMITY Right 9/25/2024    Procedure: IRRIGATION AND DEBRIDEMENT, LOWER EXTREMITY; slider table, supine, bone foam, cysto tubing, 6L NS/dakins/peroxide, culture swabs;  Surgeon: Neto Davalos MD;  Location: [facility] OR [address] [place] [address];  Service: Orthopedics;  Laterality: Right;    KNEE SURGERY Bilateral 2015    scoped    LEFT HEART CATHETERIZATION Left 3/29/2019    Procedure: Left heart cath;  Surgeon: Bladimir Barbosa MD;  Location: [facility] CATH LAB;  Service: Cardiology;  Laterality: Left;    LEFT HEART CATHETERIZATION Left 11/18/2019    Procedure: Left heart cath;  Surgeon: Karl Rico MD;  Location: [facility] CATH LAB;  Service: Cardiology;  Laterality: Left;    LEFT HEART CATHETERIZATION Left 1/8/2020    Procedure: Left heart cath, right  radial, noon start;  Surgeon: Christos Monreal MD;  Location: [facility] CATH LAB;  Service: Cardiology;  Laterality: Left;  RN Pre Op 1-6-20.  To be admitted 1-7-20 sor Aspirin Disensitation    OPEN REDUCTION AND INTERNAL FIXATION (ORIF) OF FRACTURE OF ACETABULUM Right 8/16/2024    Procedure: ORIF, FRACTURE, ACETABULUM;  Surgeon: Neto Davalos MD;  Location: [facility] OR [address] [place] [address];  Service: Orthopedics;  Laterality: Right;  anterior and lateral pelvic incisions    OPEN REDUCTION AND INTERNAL FIXATION (ORIF) OF PILON FRACTURE Right 8/14/2024    Procedure: ORIF, FRACTURE, PILON;  Surgeon: Neto Davalos MD;  Location: [facility] OR [address] [place] [address];  Service: Orthopedics;  Laterality: Right;    TONSILLECTOMY  1955    ULTRASOUND GUIDANCE  1/8/2020    Procedure: Ultrasound Guidance;  Surgeon: Christos Monreal MD;  Location: [facility] CATH LAB;  Service: Cardiology;;       Time Tracking:     OT Date of Treatment: 09/26/24  OT Start Time: 1127  OT Stop Time: 1159  OT Total Time (min): 32 min    Billable Minutes:Evaluation 9  Therapeutic Activity 23    9/26/2024

## 2024-09-26 NOTE — ASSESSMENT & PLAN NOTE
Acute blood loss anemia  Anemia is likely due to Iron deficiency and acute blood loss from right ankle. Patient with baseline iron deficiency anemia with baseline Hgb 9-10. Hgb 7.9 on admit and patient reports blood loss from ankle at home when wound dehisced causing worsening anemia. Hgb 7.9 on admit and down to 7.4 on 9/25. Patient typed and crossed and blood consent obtained and 7/3 now, will likely need transfusion in next day or so given lower 7.s watch to ensure no other bleeding signs.  Recent Labs     09/24/24  1622 09/25/24  0248 09/26/24  0447   HGB 7.9* 7.4* 7.3*   HCT 25.9* 24.1* 23.5*       Plan  - Monitor serial CBC: Daily  - Transfuse PRBC if patient becomes hemodynamically unstable, symptomatic or H/H drops below 7/21.  - Patient has not received any PRBC transfusions to date

## 2024-09-26 NOTE — PLAN OF CARE
Problem: Occupational Therapy  Goal: Occupational Therapy Goal  Description: Goals to be met by: 10/26/24     Patient will increase functional independence with ADLs by performing:    LE Dressing with Supervision.  Grooming while standing at sink with Supervision.  T/f with slideboard with CGA  Toileting from toilet with Supervision for hygiene and clothing management.   Supine to sit with Supervision.    Outcome: Progressing

## 2024-09-26 NOTE — ASSESSMENT & PLAN NOTE
Anemia is likely due to acute blood loss which was from fx and surgery . Most recent hemoglobin and hematocrit are listed below.  Recent Labs     09/24/24  1622 09/25/24  0248 09/26/24  0447   HGB 7.9* 7.4* 7.3*   HCT 25.9* 24.1* 23.5*     Plan  - Monitor serial CBC: Daily  - Transfuse PRBC if patient becomes hemodynamically unstable, symptomatic or H/H drops below 7/21.  - Patient has not received any PRBC transfusions to date  - Patient's anemia is currently stable

## 2024-09-26 NOTE — PLAN OF CARE
Problem: Adult Inpatient Plan of Care  Goal: Plan of Care Review  Outcome: Progressing  Goal: Patient-Specific Goal (Individualized)  Outcome: Progressing  Goal: Absence of Hospital-Acquired Illness or Injury  Outcome: Progressing  Intervention: Prevent Skin Injury  Flowsheets (Taken 9/26/2024 0427)  Body Position: position changed independently  Skin Protection: incontinence pads utilized  Device Skin Pressure Protection: adhesive use limited  Intervention: Prevent and Manage VTE (Venous Thromboembolism) Risk  Flowsheets (Taken 9/26/2024 0427)  VTE Prevention/Management: remove, assess skin, and reapply sequential compression device     Problem: Infection  Goal: Absence of Infection Signs and Symptoms  Outcome: Progressing     Problem: Diabetes Comorbidity  Goal: Blood Glucose Level Within Targeted Range  Outcome: Progressing     Problem: Acute Kidney Injury/Impairment  Goal: Fluid and Electrolyte Balance  Outcome: Progressing

## 2024-09-26 NOTE — CONSULTS
Pharmacokinetic Initial Assessment: IV Vancomycin    Assessment/Plan:    Initiate intravenous vancomycin with loading dose of 750 mg once with subsequent doses when random concentrations are less than 20 mcg/mL  Desired empiric serum trough concentration is 15 to 20 mcg/mL  Draw vancomycin random level on 9/26 at 2100.  Pharmacy will continue to follow and monitor vancomycin.      Please contact pharmacy at extension [phone] with any questions regarding this assessment.     Thank you for the consult,   Beryl Oliveira       Patient brief summary:  Lorena Contreras is a 73 y.o. female initiated on antimicrobial therapy with IV Vancomycin for treatment of suspected bone/joint infection    Drug Allergies:   Review of patient's allergies indicates:   Allergen Reactions    Novolin 70/30 (semi-synthetic) Nausea And Vomiting     Severe vomiting on Relion 70/30    Sulfa (sulfonamide antibiotics) Anaphylaxis    Talwin [pentazocine lactate] Anaphylaxis    Victoza [liraglutide] Nausea And Vomiting    Glipizide Nausea Only    Citric acid     Codeine     Influenza virus vaccines Hives    Iodine and iodide containing products Hives    Levetiracetam Itching    Neurontin [gabapentin]      Possible associated myoclonic jerk    Rituxan [rituximab] Hives    Zoloft [sertraline] Nausea And Vomiting       Actual Body Weight:   85.4kg    Renal Function:   Estimated Creatinine Clearance: 31.3 mL/min (A) (based on SCr of 1.9 mg/dL (H)).,     Dialysis Method (if applicable):  N/A    CBC (last 72 hours):  Recent Labs   Lab Result Units 09/24/24  1622 09/25/24  0248 09/26/24  0447   WBC K/uL 9.42 9.03 7.79   Hemoglobin g/dL 7.9* 7.4* 7.3*   Hematocrit % 25.9* 24.1* 23.5*   Platelets K/uL 213 210 209   Gran % % 85.0*  --   --    Lymph % % 8.6*  --   --    Mono % % 5.0  --   --    Eosinophil % % 0.4  --   --    Basophil % % 0.6  --   --    Differential Method  Automated  --   --        Metabolic Panel (last 72 hours):  Recent Labs   Lab Result  "Units 09/24/24  1622 09/25/24  0028 09/25/24  0248 09/26/24  0447   Sodium mmol/L 132*  --  134* 138   Potassium mmol/L 4.5  --  4.2 4.2   Chloride mmol/L 102  --  104 105   CO2 mmol/L 22*  --  21* 26   Glucose mg/dL 232*  --  126* 93   Glucose, UA   --  Negative  --   --    BUN mg/dL 36*  --  35* 32*   Creatinine mg/dL 2.0*  --  2.0* 1.9*   Albumin g/dL 2.2*  --  2.2* 2.2*   Total Bilirubin mg/dL 0.4  --  0.4 0.4   Alkaline Phosphatase U/L 476*  --  418* 377*   AST U/L 122*  --  92* 60*   ALT U/L 145*  --  122* 85*   Magnesium mg/dL  --   --  1.7 1.7       Drug levels (last 3 results):  No results for input(s): "VANCOMYCINRA", "VANCORANDOM", "VANCOMYCINPE", "VANCOPEAK", "VANCOMYCINTR", "VANCOTROUGH" in the last 72 hours.    Microbiologic Results:  Microbiology Results (last 7 days)       Procedure Component Value Units Date/Time    Culture, Anaerobe [[id number]] Collected: 09/25/24 1329    Order Status: Completed Specimen: Ankle, Right Updated: 09/26/24 0752     Anaerobic Culture Culture in progress    Narrative:      1. Right ankle wound -culture    Culture, Anaerobe [[id number]] Collected: 09/25/24 1329    Order Status: Completed Specimen: Ankle, Right Updated: 09/26/24 0752     Anaerobic Culture Culture in progress    Narrative:      3.. Right ankle wound -culture    Culture, Anaerobe [[id number]] Collected: 09/25/24 1329    Order Status: Completed Specimen: Ankle, Right Updated: 09/26/24 0752     Anaerobic Culture Culture in progress    Narrative:      2. . Right ankle wound -culture    Aerobic culture [[id number]] Collected: 09/25/24 1329    Order Status: Completed Specimen: Ankle, Right Updated: 09/26/24 0734     Aerobic Bacterial Culture No growth    Narrative:      1. Right ankle wound -culture    Aerobic culture [[id number]] Collected: 09/25/24 1329    Order Status: Completed Specimen: Ankle, Right Updated: 09/26/24 0734     Aerobic Bacterial Culture No growth    Narrative:      2. Right ankle wound " -culture    Aerobic culture [[id number]] Collected: 09/25/24 1329    Order Status: Completed Specimen: Ankle, Right Updated: 09/26/24 0734     Aerobic Bacterial Culture No growth    Narrative:      3.. Right ankle wound -culture    Urine culture [[id number]]  (Abnormal) Collected: 09/25/24 0028    Order Status: Completed Specimen: Urine Updated: 09/25/24 2210     Urine Culture, Routine GRAM NEGATIVE MAGI  > 100,000 cfu/ml  Identification and susceptibility pending  No other significant isolate      Narrative:      Specimen Source->Urine    Fungus culture [[id number]] Collected: 09/25/24 1329    Order Status: Sent Specimen: Ankle, Right Updated: 09/25/24 1418

## 2024-09-26 NOTE — ASSESSMENT & PLAN NOTE
Chronic, controlled. Latest blood pressure and vitals reviewed-     Temp:  [97.9 °F (36.6 °C)-98.6 °F (37 °C)]   Pulse:  [84-99]   Resp:  [15-20]   BP: (138-194)/(63-81)   SpO2:  [89 %-99 %] .   Home meds for hypertension were reviewed and noted below.   Hypertension Medications               isosorbide mononitrate (IMDUR) 60 MG 24 hr tablet Take 1 tablet (60 mg total) by mouth once daily.    metoprolol succinate (TOPROL-XL) 25 MG 24 hr tablet Take 1 tablet (25 mg total) by mouth once daily.            While in the hospital, will manage blood pressure as follows; Continue home antihypertensive regimen to treat in hospital.  -BP higher 9/26, had hydralazine added to isorbide/metoprolol last admit so start hydralazine now as BP 180s and she reporst pain minimal    Will utilize p.r.n. blood pressure medication only if patient's blood pressure greater than 180/110 and she develops symptoms such as worsening chest pain or shortness of breath.

## 2024-09-26 NOTE — ASSESSMENT & PLAN NOTE
- Patient on admit labs with , , and  and normal T. Jc.  - Patient denies abdominal pain or vomiting.  - Patient on Lipitor and Tylenol at home and either medication can effect liver so will hold both meds- had similar issue last admit and improved with holding statin  - Acute hepatitis panel on admit negative.   - Trend daily CMP.  - No signs of liver failure. Do not suspect underlying liver disease and likely medication effect.   -improving ast 60, alt 85

## 2024-09-26 NOTE — ASSESSMENT & PLAN NOTE
Patient is identified as having Diastolic (HFpEF) heart failure that is Acute on chronic. CHF is currently uncontrolled due to Pulmonary edema/pleural effusion on CXR. CXR on admit with mild pulmonary congestion and BNP elevated at 1373 on admit. Patient with mild excerebration. Latest ECHO performed and demonstrates- Results for orders placed during the hospital encounter of 11/03/23    Echo    Interpretation Summary    Left Ventricle: The left ventricle is normal in size. Increased wall thickness. Moderate septal thickening. Normal wall motion. There is normal systolic function with a visually estimated ejection fraction of 65 - 70%. Grade I diastolic dysfunction.    Right Ventricle: Normal right ventricular cavity size. Systolic function is normal.    Left Atrium: Left atrium is severely dilated.    Aortic Valve: There is moderate stenosis. Aortic valve area by VTI is 1.02 cm². Aortic valve peak velocity is 2.59 m/s. Mean gradient is 18 mmHg. The dimensionless index is 0.32.    Mitral Valve: There is mild regurgitation.    Tricuspid Valve: There is mild regurgitation.    Pulmonary Artery: The estimated pulmonary artery systolic pressure is 35 mmHg.    IVC/SVC: Normal venous pressure at 3 mmHg.    - started on IV Lasix 40 mg BID to treat mild heart failure. Excerebration very mild so okay to proceed with surgery as patient not hypoxic and not having any respiratory distress. Watch given mild cr high limit normal on admit but improving 9/26.  - Continue home Imdur and Toprol XL for chronic heart failure and monitor clinical status closely. Monitor on telemetry.   - Patient is off CHF pathway.    - Monitor strict Is&Os and daily weights.    - Place on fluid restriction of 1.5 L. Cardiology has not been consulted.   - Continue to stress to patient importance of self efficacy and  on diet for CHF.   - Last BNP reviewed- and noted below   Recent Labs   Lab 09/25/24  0248   BNP 1,373*

## 2024-09-26 NOTE — PROGRESS NOTES
"[facility] Medicine  Progress Note    Patient Name: Lorena Contreras  MRN: [id number]  Patient Class: IP- Inpatient   Admission Date: 9/24/2024  Length of Stay: 1 days  Attending Physician: Rupal Ochoa MD  Primary Care Provider: Jorge Paris MD        Subjective:     Principal Problem:Wound dehiscence        HPI:  Lorena Contreras is a 74 yo F with PMHx of  DM2, Fibromyalgia, lymphoma s/p chemo, HTN, HLD, CVA, MI, CAD s/p PCIs, CKD3b, HFpEF, and anemia who presented to ED for R ankle wound dehiscence. The patient reports that on 8/14/24, she was walking into a [employer] when she lost her balance, fell, and twisted her ankle. She sustained a pelvic fracture and a right pilon fracture, [place] [facility] [place]. Following the fall, she experienced urinary retention and had an indwelling catheter placed. While at the SNF, the patient reports significant progress, noting that her injuries were healing well, and she regained some mobility. Her wound was evaluated, and sutures were removed on 9/12/24. The patient was discharged home four days ago but describes her experience as "miserable," citing a lack of mobility aids provided for home use. At SNF, she had not been bearing weight on her RLE. She reports while attempting to stand on her RLE with home health her injury opened up. Since then, she has had increased swelling to her R ankle and associated generalized weakness and fatigue, decreased appetite, constipation, SOB (when anxious) and nausea. She denies fevers, chills, rhinorrhea, sore throat, cough, hemoptysis, chest pain, vomiting, diarrhea, or blood in her stool.     ED Course: AFVSS. CBC significant hgb 7.9 (down from 11.8 on month ago). No leukocytosis. CMP with Cr 2.0 (at baseline), transaminitis , , and . BG initially 232, but now 91. CRP 93 and ESR 49. Lactate 0.91. R ankle XR with stable hardware and no acute fx or dislocation, but " concerning for cellulitis. Ortho consulted and planning for I&D in AM. Given IV zofran. Placed in observation with HM.       Overview/Hospital Course:  Orthopedics consulted and patient being taken to OR today for incision and drainage and washout of right ankle surgical site for wound dehiscence. Patient with no obvious clinical signs of infection.     Interval History: known to me well from previous admit and patient also remembers me from then and report was doing well until she was home 1 day from [facility] and may have accidentally put a little weight on it but had fall and then ankle wound had dehiscence and came to the ER and went for I and D last night, op note pending but she said she is still NWB (and initially was to be NWB for 10 weeks until at least oct 26th) so PT/OT reorered for NWB to RLE given this. Was to be on eliquis for 10 weeks as well until oct 26th and had on brillinta and eliquis and her asa was held until oct 26th but was placed on asa on admit and eliquis held. Will f/u with ortho If any other surgeries planned and if not will resume eliquis and switch back to brillinta over weekend from ASA. Hg 7.3 with acute blood loss and in 7's on admit from 11 previous so watch closely to ensure no bleeding signs as may require tx tomrorow if stays on lower 7's. Cr improving to 1.9 with baseline closer to 1.7 to 1.9 and was 2.0 on admit. had rocephin started for UTI + vanc to cover wound as well as rocphin for this, vanc will also cover her recent utis with a semi resistant ecoil and e faecalis and GNR in urine now. Will f.u wound CX now and may likely need ID consult this weekend given this. Liver enzyems mildly up on admit and had similar ternd last admit, she reports taking extra tylenol at home so holding now with improving enzymes. In good spirits, looks great and bettter than last admit. Hoping for some coffee this AM as always enjoys her morning coffee. Daughter coming to visit later  today.    Review of Systems   Constitutional:  Negative for chills and fever.   Respiratory:  Negative for cough and shortness of breath.    Cardiovascular:  Negative for chest pain, palpitations and leg swelling.   Gastrointestinal:  Negative for abdominal pain, nausea and vomiting.   Genitourinary:  Negative for difficulty urinating.   Musculoskeletal:  Negative for arthralgias, joint swelling and myalgias.   Skin:  Positive for wound (Right ankle surgical wound).   Neurological:  Positive for weakness (Generalized). Negative for dizziness and light-headedness.   Psychiatric/Behavioral:  Negative for confusion and hallucinations.      Objective:     Vital Signs (Most Recent):  Temp: 97.8 °F (36.6 °C) (09/25/24 0731)  Pulse: 97 (09/25/24 1122)  Resp: 18 (09/25/24 0731)  BP: 157/69 (09/25/24 0731)  SpO2: 93 % (09/25/24 0731) on room air Vital Signs (24h Range):  Temp:  [97.9 °F (36.6 °C)-98.6 °F (37 °C)] 97.9 °F (36.6 °C)  Pulse:  [84-99] 93  Resp:  [15-20] 16  SpO2:  [89 %-99 %] 98 %  BP: (138-194)/(63-81) 189/79     Weight: 85.4 kg (188 lb 4.4 oz)  Body mass index is 27.01 kg/m².    Intake/Output Summary (Last 24 hours) at 9/26/2024 0929  Last data filed at 9/26/2024 0456  Gross per 24 hour   Intake 350 ml   Output 2100 ml   Net -1750 ml         Physical Exam  Vitals and nursing note reviewed.   Constitutional:       General: She is awake. She is not in acute distress.     Appearance: Normal appearance. She is normal weight. She is not ill-appearing.      Comments: Patient sitting up in bed in no distress and very comfortable. Patient awake and alert and oriented x 4.    Eyes:      Conjunctiva/sclera: Conjunctivae normal.   Cardiovascular:      Rate and Rhythm: Normal rate and regular rhythm.      Heart sounds: Normal heart sounds. No murmur heard.     No friction rub. No gallop.   Pulmonary:      Effort: Pulmonary effort is normal. No respiratory distress.      Breath sounds: Normal breath sounds. No wheezing.    Abdominal:      General: Abdomen is flat. Bowel sounds are normal. There is no distension.      Palpations: Abdomen is soft.      Tenderness: There is no abdominal tenderness.   Musculoskeletal:      Right lower leg: No edema.      Left lower leg: No edema.      Comments: Right ankle wound vac   Skin:     General: Skin is warm.      Findings: No erythema.      Comments: Bluish coloration to groin areas related to application of Gentian violet   Neurological:      Mental Status: She is alert and oriented to person, place, and time.   Psychiatric:         Mood and Affect: Mood normal.         Behavior: Behavior normal. Behavior is cooperative.         Thought Content: Thought content normal.         Judgment: Judgment normal.             Significant Labs: A1C:   Recent Labs   Lab 07/10/24  1105   HGBA1C 8.2*     CBC:   Recent Labs   Lab 09/24/24 1622 09/25/24 0248 09/26/24 0447   WBC 9.42 9.03 7.79   HGB 7.9* 7.4* 7.3*   HCT 25.9* 24.1* 23.5*    210 209     CMP:   Recent Labs   Lab 09/24/24 1622 09/25/24 0248 09/26/24 0447   * 134* 138   K 4.5 4.2 4.2    104 105   CO2 22* 21* 26   * 126* 93   BUN 36* 35* 32*   CREATININE 2.0* 2.0* 1.9*   CALCIUM 8.4* 8.9 8.5*   PROT 6.0 5.9* 5.9*   ALBUMIN 2.2* 2.2* 2.2*   BILITOT 0.4 0.4 0.4   ALKPHOS 476* 418* 377*   * 92* 60*   * 122* 85*   ANIONGAP 8 9 7*     Cardiac Markers:   Recent Labs   Lab 09/25/24 0248   BNP 1,373*       Magnesium:   Recent Labs   Lab 09/25/24 0248 09/26/24 0447   MG 1.7 1.7     POCT Glucose:   Recent Labs   Lab 09/25/24  1500 09/25/24  2118 09/26/24  0742   POCTGLUCOSE 164* 142* 95     Urine Studies:   Recent Labs   Lab 09/25/24  0028   COLORU Straw   APPEARANCEUA Cloudy*   PHUR 6.0   SPECGRAV 1.010   PROTEINUA 1+*   GLUCUA Negative   KETONESU Negative   BILIRUBINUA Negative   OCCULTUA 1+*   NITRITE Negative   LEUKOCYTESUR 1+*   RBCUA 6*   WBCUA >100*   BACTERIA Many*   HYALINECASTS 0       Significant  Imaging: I have reviewed all pertinent imaging results/findings within the past 24 hours.    Assessment/Plan:      * Wound dehiscence, right ankle  Closed right pilon fracture s/p ORIF on 8/14/2024  72 yo female with history of a fall resulting in a pilon fracture to the right ankle on 8/14/24 s/p ORIF. Sutures removed on 09/12/2024 in Ortho clinic. Presented to hospital for swelling and wound dehiscence to right ankle. No leukocytosis. CRP 9.3 and ESR 49. X-ray of right ankle on admit showed Orthopedic hardware intact with no acute fracture or soft tissue swelling.    - Orthopedics consulted in ED:       - Wound irrigated and soft dressings applied in ED.  - Patient non weight bearing to right lower extremity as per Ortho.   - Multimodal pain regimen. Avoid Tylenol as AST and ALT elevated. Oxycodone IR po prn for breakthrough pain.   - Plan for irrigation and debridement of right ankle wound in OR today (9/25/24) with Dr. Davalos and Orthopedics with vac placed and full op note still pending from 9/25.  -vanc/rocephin for coverage. Cx pending  -NWB (initially was NWB x 10 weeks until oct 26 at least so is still NWB and PT/OT ordered. Was at [facility] and home 1 day before dehisc of wound)  -was on eliquis until oct 26th post pelvic/ankel fx for ppx, will ask ortho if okay to resume now       Acute blood loss anemia  Anemia is likely due to acute blood loss which was from fx and surgery . Most recent hemoglobin and hematocrit are listed below.  Recent Labs     09/24/24  1622 09/25/24  0248 09/26/24  0447   HGB 7.9* 7.4* 7.3*   HCT 25.9* 24.1* 23.5*     Plan  - Monitor serial CBC: Daily  - Transfuse PRBC if patient becomes hemodynamically unstable, symptomatic or H/H drops below 7/21.  - Patient has not received any PRBC transfusions to date  - Patient's anemia is currently stable      Closed right pilon fracture  - see wound dehiscence above     Transaminitis  - Patient on admit labs with , , and ALT  145 and normal T. Jc.  - Patient denies abdominal pain or vomiting.  - Patient on Lipitor and Tylenol at home and either medication can effect liver so will hold both meds- had similar issue last admit and improved with holding statin  - Acute hepatitis panel on admit negative.   - Trend daily CMP.  - No signs of liver failure. Do not suspect underlying liver disease and likely medication effect.   -improving ast 60, alt 85    Stage 3b chronic kidney disease  Creatine stable and at baseline BMP reviewed- noted Estimated Creatinine Clearance: 29.8 mL/min (A) (based on SCr of 2 mg/dL (H)). according to latest data. Based on current GFR, CKD stage is stage 3b. Baseline Cr 1.7-2.0. Monitor UOP and serial BMP and adjust therapy as needed. Renally dose meds. Avoid nephrotoxic medications and procedures.      Acute cystitis without hematuria  - U/A on admit with > 100 WBC and many bacteria consistent with UTI. Urine culture sent and patient started on IV Ceftriaxone 1 gram daily to treat + vanc given recent semi resistant ecoli + e faecalis last month   - Follow-up urine culture results.- GNR now    Type 2 diabetes mellitus with stage 3b chronic kidney disease, with long-term current use of insulin  Patient's FSGs are controlled on current medication regimen. Patient on Lantus insulin 19 units in the evening to treat at home.   Last A1c reviewed-   Lab Results   Component Value Date    HGBA1C 8.2 (H) 07/10/2024     Most recent fingerstick glucose reviewed-   Recent Labs   Lab 09/25/24  1222 09/25/24  1500 09/25/24  2118 09/26/24  0742   POCTGLUCOSE 184* 164* 142* 95       Current correctional scale  Low  Maintain anti-hyperglycemic dose as follows-   Antihyperglycemics (From admission, onward)      Start     Stop Route Frequency Ordered    09/25/24 0008  insulin aspart U-100 pen 0-5 Units         -- SubQ Before meals & nightly PRN 09/24/24 2308    09/24/24 2315  insulin glargine U-100 (Lantus) pen 19 Units         -- SubQ  Nightly 09/24/24 2308          Hold Oral hypoglycemics while patient is in the hospital.  Target blood sugars 140-180 in hospital.  Monitor blood sugars with meals and at bedtime in hospital.   Diabetic diet post-op.   -watch closely as labile and had more lows than highs last admit, and took ozempic home med when was ath ome briefly which decreases her snacking.    Acute on chronic diastolic heart failure  Patient is identified as having Diastolic (HFpEF) heart failure that is Acute on chronic. CHF is currently uncontrolled due to Pulmonary edema/pleural effusion on CXR. CXR on admit with mild pulmonary congestion and BNP elevated at 1373 on admit. Patient with mild excerebration. Latest ECHO performed and demonstrates- Results for orders placed during the hospital encounter of 11/03/23    Echo    Interpretation Summary    Left Ventricle: The left ventricle is normal in size. Increased wall thickness. Moderate septal thickening. Normal wall motion. There is normal systolic function with a visually estimated ejection fraction of 65 - 70%. Grade I diastolic dysfunction.    Right Ventricle: Normal right ventricular cavity size. Systolic function is normal.    Left Atrium: Left atrium is severely dilated.    Aortic Valve: There is moderate stenosis. Aortic valve area by VTI is 1.02 cm². Aortic valve peak velocity is 2.59 m/s. Mean gradient is 18 mmHg. The dimensionless index is 0.32.    Mitral Valve: There is mild regurgitation.    Tricuspid Valve: There is mild regurgitation.    Pulmonary Artery: The estimated pulmonary artery systolic pressure is 35 mmHg.    IVC/SVC: Normal venous pressure at 3 mmHg.    - started on IV Lasix 40 mg BID to treat mild heart failure. Excerebration very mild so okay to proceed with surgery as patient not hypoxic and not having any respiratory distress. Watch given mild cr high limit normal on admit but improving 9/26.  - Continue home Imdur and Toprol XL for chronic heart failure and  monitor clinical status closely. Monitor on telemetry.   - Patient is off CHF pathway.    - Monitor strict Is&Os and daily weights.    - Place on fluid restriction of 1.5 L. Cardiology has not been consulted.   - Continue to stress to patient importance of self efficacy and  on diet for CHF.   - Last BNP reviewed- and noted below   Recent Labs   Lab 09/25/24  0248   BNP 1,373*         Iron deficiency anemia  Acute blood loss anemia  Anemia is likely due to Iron deficiency and acute blood loss from right ankle. Patient with baseline iron deficiency anemia with baseline Hgb 9-10. Hgb 7.9 on admit and patient reports blood loss from ankle at home when wound dehisced causing worsening anemia. Hgb 7.9 on admit and down to 7.4 on 9/25. Patient typed and crossed and blood consent obtained and 7/3 now, will likely need transfusion in next day or so given lower 7.s watch to ensure no other bleeding signs.  Recent Labs     09/24/24  1622 09/25/24  0248 09/26/24  0447   HGB 7.9* 7.4* 7.3*   HCT 25.9* 24.1* 23.5*       Plan  - Monitor serial CBC: Daily  - Transfuse PRBC if patient becomes hemodynamically unstable, symptomatic or H/H drops below 7/21.  - Patient has not received any PRBC transfusions to date      Coronary artery disease of native artery of native heart with stable angina pectoris  Patient with known CAD s/p stent placement, which is controlled. Monitor for S/Sx of angina/ACS. Continue to monitor on telemetry. Last discharge was placed on brillinta as home cards told her never go off of it and had asa held until oct 26 when goes off eilquis to avoid triple therapy  Had asa started 9/25 post op, will plan to adjust back to brillinta given this over weekedn as hg lower 7's and will do this slowly while titrating back on to regimen was sent home last admit (eliquis until oct 26th and brillinta)  -Had stents placed in 2018 to rca and 2020 to 2 areas per dr cervantes note from [facility], had stress in 2022 that  was negative for ischemia. She said he wanted to recheck another one recently but insurance did not cover. She said had aspirin allergy testing before it was restarted due to prev intolerance after a stent     Mixed hyperlipidemia  Chronic and controlled. Hold home Lipitor for now due elevated LFT's.     Follicular lymphoma  S/p chemo in 2010. In remission.     Primary hypertension  Chronic, controlled. Latest blood pressure and vitals reviewed-     Temp:  [97.9 °F (36.6 °C)-98.6 °F (37 °C)]   Pulse:  [84-99]   Resp:  [15-20]   BP: (138-194)/(63-81)   SpO2:  [89 %-99 %] .   Home meds for hypertension were reviewed and noted below.   Hypertension Medications               isosorbide mononitrate (IMDUR) 60 MG 24 hr tablet Take 1 tablet (60 mg total) by mouth once daily.    metoprolol succinate (TOPROL-XL) 25 MG 24 hr tablet Take 1 tablet (25 mg total) by mouth once daily.            While in the hospital, will manage blood pressure as follows; Continue home antihypertensive regimen to treat in hospital.  -BP higher 9/26, had hydralazine added to isorbide/metoprolol last admit so start hydralazine now as BP 180s and she reporst pain minimal    Will utilize p.r.n. blood pressure medication only if patient's blood pressure greater than 180/110 and she develops symptoms such as worsening chest pain or shortness of breath.      VTE Risk Mitigation (From admission, onward)           Ordered     IP VTE HIGH RISK PATIENT  Once         09/24/24 2231     Reason for No Pharmacological VTE Prophylaxis  Once        Question:  Reasons:  Answer:  Already adequately anticoagulated on oral Anticoagulants    09/24/24 2231                    Discharge Planning   MIKHAIL: 9/28/2024     Code Status: Full Code   Is the patient medically ready for discharge?:     Reason for patient still in hospital (select all that apply): Patient trending condition                     Rupal Ochoa MD  Department of Hospital Medicine   [facility] -  Sur[facility]

## 2024-09-26 NOTE — ASSESSMENT & PLAN NOTE
Patient's FSGs are controlled on current medication regimen. Patient on Lantus insulin 19 units in the evening to treat at home.   Last A1c reviewed-   Lab Results   Component Value Date    HGBA1C 8.2 (H) 07/10/2024     Most recent fingerstick glucose reviewed-   Recent Labs   Lab 09/25/24  1222 09/25/24  1500 09/25/24  2118 09/26/24  0742   POCTGLUCOSE 184* 164* 142* 95       Current correctional scale  Low  Maintain anti-hyperglycemic dose as follows-   Antihyperglycemics (From admission, onward)      Start     Stop Route Frequency Ordered    09/25/24 0008  insulin aspart U-100 pen 0-5 Units         -- SubQ Before meals & nightly PRN 09/24/24 2308    09/24/24 2315  insulin glargine U-100 (Lantus) pen 19 Units         -- SubQ Nightly 09/24/24 2308          Hold Oral hypoglycemics while patient is in the hospital.  Target blood sugars 140-180 in hospital.  Monitor blood sugars with meals and at bedtime in hospital.   Diabetic diet post-op.   -watch closely as labile and had more lows than highs last admit, and took ozempic home med when was ath ome briefly which decreases her snacking.

## 2024-09-26 NOTE — PLAN OF CARE
PT Evaluation completed and PT POC established.    Problem: Physical Therapy  Goal: Physical Therapy Goal  Description: Goals to be met by: 10/26/2024     Patient will increase functional independence with mobility by performin. Supine to sit with Modified [place]  2. Sit to supine with Modified [place]  3. Sit to stand transfer with Minimal Assistance  4. Bed to chair transfer with Minimal Assistance using LRAD  5. Gait  x 5 feet with Moderate Assistance using LRAD.   6. Wheelchair propulsion x100 feet with Supervision using bilateral upper extremities    Outcome: Progressing

## 2024-09-26 NOTE — ASSESSMENT & PLAN NOTE
Closed right pilon fracture s/p ORIF on 8/14/2024  74 yo female with history of a fall resulting in a pilon fracture to the right ankle on 8/14/24 s/p ORIF. Sutures removed on 09/12/2024 in Ortho clinic. Presented to hospital for swelling and wound dehiscence to right ankle. No leukocytosis. CRP 9.3 and ESR 49. X-ray of right ankle on admit showed Orthopedic hardware intact with no acute fracture or soft tissue swelling.    - Orthopedics consulted in ED:       - Wound irrigated and soft dressings applied in ED.  - Patient non weight bearing to right lower extremity as per Ortho.   - Multimodal pain regimen. Avoid Tylenol as AST and ALT elevated. Oxycodone IR po prn for breakthrough pain.   - Plan for irrigation and debridement of right ankle wound in OR today (9/25/24) with Dr. Davalos and Orthopedics with vac placed and full op note still pending from 9/25.  -vanc/rocephin for coverage. Cx pending  -NWB (initially was NWB x 10 weeks until oct 26 at least so is still NWB and PT/OT ordered. Was at [facility] and home 1 day before dehisc of wound)  -was on eliquis until oct 26th post pelvic/ankel fx for ppx, will ask ortho if okay to resume now

## 2024-09-26 NOTE — ASSESSMENT & PLAN NOTE
- U/A on admit with > 100 WBC and many bacteria consistent with UTI. Urine culture sent and patient started on IV Ceftriaxone 1 gram daily to treat + vanc given recent semi resistant ecoli + e faecalis last month   - Follow-up urine culture results.- GNR now

## 2024-09-26 NOTE — ASSESSMENT & PLAN NOTE
Lorena Contreras is a 73 y.o. female s/p ORIF right pilon fx presents with right medial ankle wound dehiscence.  Presented afebrile with labs significant for WBC 7.79, ESR  , CRP  .  On physical exam, medial ankle surgical wound noted to be open and draining serosanguinous fluid. Incision was irrigated with 4 L of normal saline. Sterile saline soaked gauze and soft dressings were applied to wound.  Patient's extremity was elevated.     S/p I&D R ankle 9/25/24.    Plan:  - Admit to    - Abx: vanc and rocephin  - ID consulted, appreciate recs  - Cultures NGTD  - NWB RLE x 10 weeks from date of ORIF (8/14/24)  - Prevena at 75mmHg, f/u 1 week for removal  - Kaycee: MARYBETH

## 2024-09-26 NOTE — SUBJECTIVE & OBJECTIVE
Principal Problem:Wound dehiscence    Principal Orthopedic Problem: as above, s/p I&D right ankle 9/25    Interval History: Patient seen and examined at bedside. NAEON. VSS, afebrile. Patient doing well. Pain well controlled. Patient worked with PT today. Encouraged to continue working with therapy. Hgb 7.3.           Review of patient's allergies indicates:   Allergen Reactions    Novolin 70/30 (semi-synthetic) Nausea And Vomiting     Severe vomiting on Relion 70/30    Sulfa (sulfonamide antibiotics) Anaphylaxis    Talwin [pentazocine lactate] Anaphylaxis    Victoza [liraglutide] Nausea And Vomiting    Glipizide Nausea Only    Citric acid     Codeine     Influenza virus vaccines Hives    Iodine and iodide containing products Hives    Levetiracetam Itching    Neurontin [gabapentin]      Possible associated myoclonic jerk    Rituxan [rituximab] Hives    Zoloft [sertraline] Nausea And Vomiting       Current Facility-Administered Medications   Medication    albuterol-ipratropium 2.5 mg-0.5 mg/3 mL nebulizer solution 3 mL    aluminum-magnesium hydroxide-simethicone 200-200-20 mg/5 mL suspension 30 mL    aluminum-magnesium hydroxide-simethicone 200-200-20 mg/5 mL suspension 30 mL    aspirin chewable tablet 81 mg    cefTRIAXone (Rocephin) 1 g in D5W 100 mL IVPB (MB+)    dextrose 10% bolus 125 mL 125 mL    dextrose 10% bolus 250 mL 250 mL    FLUoxetine capsule 40 mg    furosemide injection 40 mg    glucagon (human recombinant) injection 1 mg    glucose chewable tablet 16 g    glucose chewable tablet 24 g    hydrALAZINE tablet 50 mg    hydrOXYzine HCL tablet 25 mg    insulin aspart U-100 pen 0-5 Units    insulin glargine U-100 (Lantus) pen 19 Units    isosorbide mononitrate 24 hr tablet 60 mg    melatonin tablet 6 mg    methocarbamoL tablet 500 mg    metoprolol succinate (TOPROL-XL) 24 hr tablet 25 mg    naloxone 0.4 mg/mL injection 0.02 mg    ondansetron tablet 4 mg    oxyCODONE immediate release tablet 5 mg     "polyethylene glycol packet 17 g    prochlorperazine injection Soln 5 mg    QUEtiapine tablet 50 mg    simethicone chewable tablet 80 mg    sodium chloride 0.9% flush 5 mL    sucralfate 100 mg/mL suspension 1 g    vancomycin - pharmacy to dose     Objective:     Vital Signs (Most Recent):  Temp: 98.3 °F (36.8 °C) (09/26/24 1526)  Pulse: 96 (09/26/24 1527)  Resp: 16 (09/26/24 1526)  BP: (!) 179/74 (09/26/24 1526)  SpO2: (!) 94 % (09/26/24 1526) Vital Signs (24h Range):  Temp:  [97.9 °F (36.6 °C)-98.3 °F (36.8 °C)] 98.3 °F (36.8 °C)  Pulse:  [85-99] 96  Resp:  [16-18] 16  SpO2:  [94 %-98 %] 94 %  BP: (138-189)/(63-79) 179/74     Weight: 85.4 kg (188 lb 4.4 oz)  Height: 5' 10" (177.8 cm)  Body mass index is 27.01 kg/m².      Intake/Output Summary (Last 24 hours) at 9/26/2024 1723  Last data filed at 9/26/2024 1040  Gross per 24 hour   Intake --   Output 2600 ml   Net -2600 ml        Ortho/SPM Exam  A&O x 3  Regular Rate  Non-Labored Respirations    RLE:  Wound vac with good seal  Ace wrap in place  Fires Quad/TA/EHL/GSC  SILT  Compartments soft  Brisk cap refill         Significant Labs: All pertinent labs within the past 24 hours have been reviewed.    Significant Imaging: I have reviewed all pertinent imaging results/findings.  "

## 2024-09-26 NOTE — ASSESSMENT & PLAN NOTE
Patient with known CAD s/p stent placement, which is controlled. Monitor for S/Sx of angina/ACS. Continue to monitor on telemetry. Last discharge was placed on brillinta as home cards told her never go off of it and had asa held until oct 26 when goes off eilquis to avoid triple therapy  Had asa started 9/25 post op, will plan to adjust back to brillinta given this over weekedn as hg lower 7's and will do this slowly while titrating back on to regimen was sent home last admit (eliquis until oct 26th and brillinta)  -Had stents placed in 2018 to rca and 2020 to 2 areas per dr cervantes note from [facility], had stress in 2022 that was negative for ischemia. She said he wanted to recheck another one recently but insurance did not cover. She said had aspirin allergy testing before it was restarted due to prev intolerance after a stent

## 2024-09-26 NOTE — PT/OT/SLP EVAL
Physical Therapy Co-Evaluation    Patient Name:  Lorena Contreras   MRN:  [id number]    Recommendations:     Discharge Recommendations: Moderate Intensity Therapy   Discharge Equipment Recommendations: none   Barriers to discharge: Decreased caregiver support    Assessment:     Lorena Contreras is a 73 y.o. female admitted with a medical diagnosis of Wound dehiscence.  She presents with the following impairments/functional limitations: weakness, impaired endurance, impaired functional mobility, impaired balance, decreased lower extremity function, decreased coordination, decreased upper extremity function, pain, edema, impaired cardiopulmonary response to activity.    Pleasant and cooperative, appeared anxious with fear of falling. Pt with JIM DIAZ Reported multiple falls during rehab stay and at home. Family reported they were sent home before equipment arrived at home; the equipment (wheelchair, drop arm commode, and sliding board) was delivered to their home yesterday 9/25. At rehab, pt worked on sliding board transfers to wheelchair. This session, attempted STS RW with 2 person assist and presented with poor compliance with WB precaution thus cessation warranted. Pt will benefit from skilled PT services while in house in order to address the aforementioned deficits.      Rehab Prognosis: Good; patient would benefit from acute skilled PT services to address these deficits and reach maximum level of function.    Recent Surgery: Procedure(s) (LRB):  IRRIGATION AND DEBRIDEMENT, LOWER EXTREMITY; slider table, supine, bone foam, cysto tubing, 6L NS/dakins/peroxide, culture swabs (Right)  APPLICATION, WOUND VAC (Right)  DEBRIDEMENT, LOCAL FLAP (Right) 1 Day Post-Op    Plan:     During this hospitalization, patient to be seen 4 x/week to address the identified rehab impairments via gait training, therapeutic activities, therapeutic exercises, neuromuscular re-education, wheelchair management/training and progress  "toward the following goals:    Plan of Care Expires:  10/26/24    Subjective     "I need bad "    Pain/Comfort:  Pain Rating 1:  (not rated)  Location - Side 1: Right  Location 1: hip  Pain Addressed 1: Distraction, Reposition, Cessation of Activity    Patients cultural, spiritual, Taoist conflicts given the current situation: no    Living Environment:  Per pt, pt resides alone in [facility] with 4-5 NAMAN B HR (wide) and ramp entrance. Pt has tub/shower combo with TTB in place.  Prior to admission, patients level of function was sliding board transfer 1 person assist; just d/c from [facility] and went home for a day.  Equipment used at home: cane, quad, cane, straight, bath bench, drop arm commode, walker, rolling, rollator, wheelchair, slide board, hospital bed.  DME owned (not currently used):  quad cane, single point cane, rollator, rolling walker .  Upon discharge, patient will have limited assistance from daughters.    Objective:     Communicated with RN prior to session.  Patient found HOB elevated with peripheral IV, telemetry, wound vac, chong catheter, oxygen  upon PT entry to room.    General Precautions: Standard, fall  Orthopedic Precautions:RLE non weight bearing   Braces: N/A  Respiratory Status: Nasal cannula, flow 2 L/min    Exams:  RLE ROM: ankle ace wrapped  RLE Strength: knee ext 3/5, hip flexion 2/5  LLE ROM: WFL  LLE Strength: grossly 4/5    Functional Mobility:  Bed Mobility:     Scooting:  Anterior: contact guard assistance B UE support  Lateral: total assistance and knee block; poor hip clearance with decreased compliance to R LE NWB cessation warranted  Supine to Sit: contact guard assistance and rail with HOB elevated  Sit to Supine: moderate assistance B LE management  Transfers:     Sit to Stand:  total assistance and of 2 persons with rolling walker; decreased initiation, 75% hip clearance with excessive trunk flexion however poor compliance with R LE NWB thus cessation " warranted  Balance:   Good sitting balance  Poor standing balance      AM-PAC 6 CLICK MOBILITY  Total Score:12       Treatment & Education:  Educated pt on PT role/POC  Educated pt on importance of OOB activity and daily ambulation   Pt educated on proper body mechanics, safety techniques, and energy conservation with PT facilitation and cueing throughout session   Pt verbalized understanding      Patient left HOB elevated with all lines intact, call button in reach, RN notified, and OT and 2 daughters present.    GOALS:   Multidisciplinary Problems       Physical Therapy Goals          Problem: Physical Therapy    Goal Priority Disciplines Outcome Goal Variances Interventions   Physical Therapy Goal     PT, PT/OT Progressing     Description: Goals to be met by: 10/26/2024     Patient will increase functional independence with mobility by performin. Supine to sit with Modified [place]  2. Sit to supine with Modified [place]  3. Sit to stand transfer with Minimal Assistance  4. Bed to chair transfer with Minimal Assistance using LRAD  5. Gait  x 5 feet with Moderate Assistance using LRAD.   6. Wheelchair propulsion x100 feet with Supervision using bilateral upper extremities                         History:     Past Medical History:   Diagnosis Date    Allergy     Altered mental status 2022    DYSARTHRIA, SPASTIC MOVEMENTS & DIFFICULTY SWALLOWING    Anemia     Anxiety     Arthritis     Cataract     both removed    Colon polyps     Coronary artery disease     Depression     Diabetes mellitus, type II     Disorder of kidney and ureter     Epilepsia partialis continua 2023    Fibromyalgia     Follicular lymphoma     GERD (gastroesophageal reflux disease)     HTN (hypertension)     Hyperlipidemia     MI (myocardial infarction) 2019    Personal history of colonic polyps     Restless leg syndrome     Stroke        Past Surgical History:   Procedure Laterality Date    APPLICATION OF WOUND  VACUUM-ASSISTED CLOSURE DEVICE Right 9/25/2024    Procedure: APPLICATION, WOUND VAC;  Surgeon: Neto Davalos MD;  [facility];  Service: Orthopedics;  Laterality: Right;    COLONOSCOPY  11/07/2012    Colon polyp found; repeat in 5 years    DEBRIDEMENT OF LOCAL FLAP Right 9/25/2024    Procedure: DEBRIDEMENT, LOCAL FLAP;  Surgeon: Neto Davalos MD;  [facility];  Service: Orthopedics;  Laterality: Right;    ELBOW SURGERY Right 2015    dislocation repair     ESOPHAGOGASTRODUODENOSCOPY  11/07/2012    atrophic gastritis, H pylori testing negative    INCISION AND DRAINAGE FOOT Right 6/2/2021    Procedure: INCISION AND DRAINAGE, FOOT, bone biopsy;  Surgeon: Quiana Penn DPM;  Location: [facility] OR;  Service: Podiatry;  Laterality: Right;    IRRIGATION AND DEBRIDEMENT OF LOWER EXTREMITY Right 9/25/2024    Procedure: IRRIGATION AND DEBRIDEMENT, LOWER EXTREMITY; slider table, supine, bone foam, cysto tubing, 6L NS/dakins/peroxide, culture swabs;  Surgeon: Neto Davalos MD;  [facility];  Service: Orthopedics;  Laterality: Right;    KNEE SURGERY Bilateral 2015    scoped    LEFT HEART CATHETERIZATION Left 3/29/2019    Procedure: Left heart cath;  Surgeon: Bladimir Barbosa MD;  Location: [facility] CATH LAB;  Service: Cardiology;  Laterality: Left;    LEFT HEART CATHETERIZATION Left 11/18/2019    Procedure: Left heart cath;  Surgeon: Karl Rico MD;  Location: [facility] CATH LAB;  Service: Cardiology;  Laterality: Left;    LEFT HEART CATHETERIZATION Left 1/8/2020    Procedure: Left heart cath, right radial, noon start;  Surgeon: Christos Monreal MD;  Location: [facility] CATH LAB;  Service: Cardiology;  Laterality: Left;  RN Pre Op 1-6-20.  To be admitted 1-7-20 sor Aspirin Disensitation    OPEN REDUCTION AND INTERNAL FIXATION (ORIF) OF FRACTURE OF ACETABULUM Right 8/16/2024    Procedure: ORIF, FRACTURE, ACETABULUM;  Surgeon: Neto Davalos MD;   [facility];  Service: Orthopedics;  Laterality: Right;  anterior and lateral pelvic incisions    OPEN REDUCTION AND INTERNAL FIXATION (ORIF) OF PILON FRACTURE Right 8/14/2024    Procedure: ORIF, FRACTURE, PILON;  Surgeon: Neto Davalos MD;  [facility];  Service: Orthopedics;  Laterality: Right;    TONSILLECTOMY  1955    ULTRASOUND GUIDANCE  1/8/2020    Procedure: Ultrasound Guidance;  Surgeon: Christos Monreal MD;  Location: [facility] CATH LAB;  Service: Cardiology;;       Time Tracking:     PT Received On: 09/26/24  PT Start Time: 1127     PT Stop Time: 1159  PT Total Time (min): 32 min     Billable Minutes: Evaluation 9 and Neuromuscular Re-education 23    Co-treatment performed due to patient's multiple deficits requiring two skilled therapists to appropriately and safely assess patient's strength and endurance while facilitating functional tasks in addition to accommodating for patient's activity tolerance.       09/26/2024

## 2024-09-26 NOTE — PLAN OF CARE
Ortho updates- 50% weight beraing RLE x 3 weeks. Udpated PT orders and weight bearing status in epic, session today appears already completed with NWB so will reassess further, recs so far were SNF

## 2024-09-26 NOTE — PROGRESS NOTES
[facility] - [facility]  Orthopedics  Progress Note    Patient Name: Lorena Contreras  MRN: [id number]  Admission Date: 9/24/2024  Hospital Length of Stay: 1 days  Attending Provider: Rupal Ochoa MD  Primary Care Provider: Jorge Paris MD  Follow-up For: Procedure(s) (LRB):  IRRIGATION AND DEBRIDEMENT, LOWER EXTREMITY; slider table, supine, bone foam, cysto tubing, 6L NS/dakins/peroxide, culture swabs (Right)  APPLICATION, WOUND VAC (Right)  DEBRIDEMENT, LOCAL FLAP (Right)    Post-Operative Day: 1 Day Post-Op  Subjective:     Principal Problem:Wound dehiscence    Principal Orthopedic Problem: as above, s/p I&D right ankle 9/25    Interval History: Patient seen and examined at bedside. NAEON. VSS, afebrile. Patient doing well. Pain well controlled. Patient worked with PT today. Encouraged to continue working with therapy. Hgb 7.3.           Review of patient's allergies indicates:   Allergen Reactions    Novolin 70/30 (semi-synthetic) Nausea And Vomiting     Severe vomiting on Relion 70/30    Sulfa (sulfonamide antibiotics) Anaphylaxis    Talwin [pentazocine lactate] Anaphylaxis    Victoza [liraglutide] Nausea And Vomiting    Glipizide Nausea Only    Citric acid     Codeine     Influenza virus vaccines Hives    Iodine and iodide containing products Hives    Levetiracetam Itching    Neurontin [gabapentin]      Possible associated myoclonic jerk    Rituxan [rituximab] Hives    Zoloft [sertraline] Nausea And Vomiting       Current Facility-Administered Medications   Medication    albuterol-ipratropium 2.5 mg-0.5 mg/3 mL nebulizer solution 3 mL    aluminum-magnesium hydroxide-simethicone 200-200-20 mg/5 mL suspension 30 mL    aluminum-magnesium hydroxide-simethicone 200-200-20 mg/5 mL suspension 30 mL    aspirin chewable tablet 81 mg    cefTRIAXone (Rocephin) 1 g in D5W 100 mL IVPB (MB+)    dextrose 10% bolus 125 mL 125 mL    dextrose 10% bolus 250 mL 250 mL    FLUoxetine capsule 40 mg    furosemide injection  "40 mg    glucagon (human recombinant) injection 1 mg    glucose chewable tablet 16 g    glucose chewable tablet 24 g    hydrALAZINE tablet 50 mg    hydrOXYzine HCL tablet 25 mg    insulin aspart U-100 pen 0-5 Units    insulin glargine U-100 (Lantus) pen 19 Units    isosorbide mononitrate 24 hr tablet 60 mg    melatonin tablet 6 mg    methocarbamoL tablet 500 mg    metoprolol succinate (TOPROL-XL) 24 hr tablet 25 mg    naloxone 0.4 mg/mL injection 0.02 mg    ondansetron tablet 4 mg    oxyCODONE immediate release tablet 5 mg    polyethylene glycol packet 17 g    prochlorperazine injection Soln 5 mg    QUEtiapine tablet 50 mg    simethicone chewable tablet 80 mg    sodium chloride 0.9% flush 5 mL    sucralfate 100 mg/mL suspension 1 g    vancomycin - pharmacy to dose     Objective:     Vital Signs (Most Recent):  Temp: 98.3 °F (36.8 °C) (09/26/24 1526)  Pulse: 96 (09/26/24 1527)  Resp: 16 (09/26/24 1526)  BP: (!) 179/74 (09/26/24 1526)  SpO2: (!) 94 % (09/26/24 1526) Vital Signs (24h Range):  Temp:  [97.9 °F (36.6 °C)-98.3 °F (36.8 °C)] 98.3 °F (36.8 °C)  Pulse:  [85-99] 96  Resp:  [16-18] 16  SpO2:  [94 %-98 %] 94 %  BP: (138-189)/(63-79) 179/74     Weight: 85.4 kg (188 lb 4.4 oz)  Height: 5' 10" (177.8 cm)  Body mass index is 27.01 kg/m².      Intake/Output Summary (Last 24 hours) at 9/26/2024 1723  Last data filed at 9/26/2024 1040  Gross per 24 hour   Intake --   Output 2600 ml   Net -2600 ml        Ortho/SPM Exam  A&O x 3  Regular Rate  Non-Labored Respirations    RLE:  Wound vac with good seal  Ace wrap in place  Fires Quad/TA/EHL/GSC  SILT  Compartments soft  Brisk cap refill         Significant Labs: All pertinent labs within the past 24 hours have been reviewed.    Significant Imaging: I have reviewed all pertinent imaging results/findings.  Assessment/Plan:     * Wound dehiscence, right ankle  Lorena Contreras is a 73 y.o. female s/p ORIF right pilon fx presents with right medial ankle wound dehiscence.  " Presented afebrile with labs significant for WBC 7.79, ESR  , CRP  .  On physical exam, medial ankle surgical wound noted to be open and draining serosanguinous fluid. Incision was irrigated with 4 L of normal saline. Sterile saline soaked gauze and soft dressings were applied to wound.  Patient's extremity was elevated.     S/p I&D R ankle 9/25/24.    Plan:  - Admit to    - Abx: vanc and rocephin  - ID consulted, appreciate recs  - Cultures NGTD  - NWB RLE x 10 weeks from date of ORIF (8/14/24)  - Prevena at 75mmHg, f/u 1 week for removal  - Kaycee: MARYBETH Marroquin MD  Orthopedics  [facility] - Surgery

## 2024-09-27 PROBLEM — R19.7 DIARRHEA: Status: ACTIVE | Noted: 2024-09-27

## 2024-09-27 LAB
ALBUMIN SERPL BCP-MCNC: 2.1 G/DL (ref 3.5–5.2)
ALP SERPL-CCNC: 342 U/L (ref 55–135)
ALT SERPL W/O P-5'-P-CCNC: 69 U/L (ref 10–44)
ANION GAP SERPL CALC-SCNC: 8 MMOL/L (ref 8–16)
AST SERPL-CCNC: 61 U/L (ref 10–40)
BACTERIA UR CULT: ABNORMAL
BILIRUB SERPL-MCNC: 0.3 MG/DL (ref 0.1–1)
BLD PROD TYP BPU: NORMAL
BLOOD UNIT EXPIRATION DATE: NORMAL
BLOOD UNIT TYPE CODE: 5100
BLOOD UNIT TYPE: NORMAL
BUN SERPL-MCNC: 30 MG/DL (ref 8–23)
CALCIUM SERPL-MCNC: 8.5 MG/DL (ref 8.7–10.5)
CHLORIDE SERPL-SCNC: 101 MMOL/L (ref 95–110)
CO2 SERPL-SCNC: 29 MMOL/L (ref 23–29)
CODING SYSTEM: NORMAL
CREAT SERPL-MCNC: 1.9 MG/DL (ref 0.5–1.4)
CROSSMATCH INTERPRETATION: NORMAL
DISPENSE STATUS: NORMAL
ERYTHROCYTE [DISTWIDTH] IN BLOOD BY AUTOMATED COUNT: 15.2 % (ref 11.5–14.5)
EST. GFR  (NO RACE VARIABLE): 27.5 ML/MIN/1.73 M^2
FINAL PATHOLOGIC DIAGNOSIS: NORMAL
GLUCOSE SERPL-MCNC: 88 MG/DL (ref 70–110)
GROSS: NORMAL
HCT VFR BLD AUTO: 24.2 % (ref 37–48.5)
HGB BLD-MCNC: 7.3 G/DL (ref 12–16)
Lab: NORMAL
MAGNESIUM SERPL-MCNC: 1.9 MG/DL (ref 1.6–2.6)
MCH RBC QN AUTO: 29 PG (ref 27–31)
MCHC RBC AUTO-ENTMCNC: 30.2 G/DL (ref 32–36)
MCV RBC AUTO: 96 FL (ref 82–98)
NUM UNITS TRANS PACKED RBC: NORMAL
PHOSPHATE SERPL-MCNC: 3.4 MG/DL (ref 2.7–4.5)
PLATELET # BLD AUTO: 224 K/UL (ref 150–450)
PMV BLD AUTO: 12.5 FL (ref 9.2–12.9)
POCT GLUCOSE: 123 MG/DL (ref 70–110)
POCT GLUCOSE: 147 MG/DL (ref 70–110)
POCT GLUCOSE: 97 MG/DL (ref 70–110)
POTASSIUM SERPL-SCNC: 4.2 MMOL/L (ref 3.5–5.1)
PROT SERPL-MCNC: 5.8 G/DL (ref 6–8.4)
RBC # BLD AUTO: 2.52 M/UL (ref 4–5.4)
SODIUM SERPL-SCNC: 138 MMOL/L (ref 136–145)
VANCOMYCIN SERPL-MCNC: 22.4 UG/ML
WBC # BLD AUTO: 8.17 K/UL (ref 3.9–12.7)

## 2024-09-27 PROCEDURE — 36430 TRANSFUSION BLD/BLD COMPNT: CPT | Mod: HCNC

## 2024-09-27 PROCEDURE — P9040 RBC LEUKOREDUCED IRRADIATED: HCPCS | Mod: HCNC | Performed by: HOSPITALIST

## 2024-09-27 PROCEDURE — 36415 COLL VENOUS BLD VENIPUNCTURE: CPT | Mod: HCNC | Performed by: HOSPITALIST

## 2024-09-27 PROCEDURE — 84100 ASSAY OF PHOSPHORUS: CPT | Mod: HCNC | Performed by: HOSPITALIST

## 2024-09-27 PROCEDURE — 25000003 PHARM REV CODE 250: Mod: HCNC | Performed by: HOSPITALIST

## 2024-09-27 PROCEDURE — 80202 ASSAY OF VANCOMYCIN: CPT | Mod: HCNC | Performed by: HOSPITALIST

## 2024-09-27 PROCEDURE — 25000003 PHARM REV CODE 250: Mod: HCNC | Performed by: INTERNAL MEDICINE

## 2024-09-27 PROCEDURE — 25000003 PHARM REV CODE 250: Mod: HCNC

## 2024-09-27 PROCEDURE — 83735 ASSAY OF MAGNESIUM: CPT | Mod: HCNC | Performed by: HOSPITALIST

## 2024-09-27 PROCEDURE — 21400001 HC TELEMETRY ROOM: Mod: HCNC

## 2024-09-27 PROCEDURE — 30233N1 TRANSFUSION OF NONAUTOLOGOUS RED BLOOD CELLS INTO PERIPHERAL VEIN, PERCUTANEOUS APPROACH: ICD-10-PCS | Performed by: HOSPITALIST

## 2024-09-27 PROCEDURE — 63600175 PHARM REV CODE 636 W HCPCS: Mod: HCNC | Performed by: INTERNAL MEDICINE

## 2024-09-27 PROCEDURE — 80053 COMPREHEN METABOLIC PANEL: CPT | Mod: HCNC | Performed by: HOSPITALIST

## 2024-09-27 PROCEDURE — 99223 1ST HOSP IP/OBS HIGH 75: CPT | Mod: HCNC,,, | Performed by: INTERNAL MEDICINE

## 2024-09-27 PROCEDURE — 63600175 PHARM REV CODE 636 W HCPCS: Mod: HCNC | Performed by: HOSPITALIST

## 2024-09-27 PROCEDURE — 85027 COMPLETE CBC AUTOMATED: CPT | Mod: HCNC | Performed by: HOSPITALIST

## 2024-09-27 PROCEDURE — 86920 COMPATIBILITY TEST SPIN: CPT | Mod: HCNC | Performed by: HOSPITALIST

## 2024-09-27 RX ORDER — HYDROCODONE BITARTRATE AND ACETAMINOPHEN 500; 5 MG/1; MG/1
TABLET ORAL
Status: DISCONTINUED | OUTPATIENT
Start: 2024-09-27 | End: 2024-10-03 | Stop reason: HOSPADM

## 2024-09-27 RX ORDER — LOPERAMIDE HYDROCHLORIDE 2 MG/1
2 CAPSULE ORAL 4 TIMES DAILY PRN
Status: DISCONTINUED | OUTPATIENT
Start: 2024-09-27 | End: 2024-09-28

## 2024-09-27 RX ORDER — INSULIN GLARGINE 100 [IU]/ML
16 INJECTION, SOLUTION SUBCUTANEOUS NIGHTLY
Status: DISCONTINUED | OUTPATIENT
Start: 2024-09-27 | End: 2024-10-03 | Stop reason: HOSPADM

## 2024-09-27 RX ADMIN — METHOCARBAMOL 500 MG: 500 TABLET ORAL at 01:09

## 2024-09-27 RX ADMIN — ALUMINUM HYDROXIDE, MAGNESIUM HYDROXIDE, AND SIMETHICONE 30 ML: 1200; 120; 1200 SUSPENSION ORAL at 11:09

## 2024-09-27 RX ADMIN — ASPIRIN 81 MG CHEWABLE TABLET 81 MG: 81 TABLET CHEWABLE at 10:09

## 2024-09-27 RX ADMIN — METHOCARBAMOL 500 MG: 500 TABLET ORAL at 10:09

## 2024-09-27 RX ADMIN — ALUMINUM HYDROXIDE, MAGNESIUM HYDROXIDE, AND SIMETHICONE 30 ML: 1200; 120; 1200 SUSPENSION ORAL at 04:09

## 2024-09-27 RX ADMIN — FUROSEMIDE 40 MG: 10 INJECTION, SOLUTION INTRAVENOUS at 10:09

## 2024-09-27 RX ADMIN — INSULIN GLARGINE 16 UNITS: 100 INJECTION, SOLUTION SUBCUTANEOUS at 09:09

## 2024-09-27 RX ADMIN — OXYCODONE HYDROCHLORIDE 5 MG: 5 TABLET ORAL at 04:09

## 2024-09-27 RX ADMIN — FLUOXETINE HYDROCHLORIDE 40 MG: 20 CAPSULE ORAL at 10:09

## 2024-09-27 RX ADMIN — QUETIAPINE FUMARATE 50 MG: 25 TABLET ORAL at 09:09

## 2024-09-27 RX ADMIN — SUCRALFATE 1 G: 1 SUSPENSION ORAL at 11:09

## 2024-09-27 RX ADMIN — HYDRALAZINE HYDROCHLORIDE 75 MG: 50 TABLET ORAL at 09:09

## 2024-09-27 RX ADMIN — LOPERAMIDE HYDROCHLORIDE 2 MG: 2 CAPSULE ORAL at 10:09

## 2024-09-27 RX ADMIN — HYDRALAZINE HYDROCHLORIDE 75 MG: 50 TABLET ORAL at 01:09

## 2024-09-27 RX ADMIN — FUROSEMIDE 40 MG: 10 INJECTION, SOLUTION INTRAVENOUS at 09:09

## 2024-09-27 RX ADMIN — SUCRALFATE 1 G: 1 SUSPENSION ORAL at 05:09

## 2024-09-27 RX ADMIN — METHOCARBAMOL 500 MG: 500 TABLET ORAL at 09:09

## 2024-09-27 RX ADMIN — APIXABAN 2.5 MG: 2.5 TABLET, FILM COATED ORAL at 09:09

## 2024-09-27 RX ADMIN — OXYCODONE HYDROCHLORIDE 5 MG: 5 TABLET ORAL at 03:09

## 2024-09-27 RX ADMIN — CEFTRIAXONE 1 G: 1 INJECTION, POWDER, FOR SOLUTION INTRAMUSCULAR; INTRAVENOUS at 10:09

## 2024-09-27 RX ADMIN — METOPROLOL SUCCINATE 25 MG: 25 TABLET, EXTENDED RELEASE ORAL at 10:09

## 2024-09-27 RX ADMIN — HYDRALAZINE HYDROCHLORIDE 50 MG: 50 TABLET ORAL at 05:09

## 2024-09-27 RX ADMIN — METHOCARBAMOL 500 MG: 500 TABLET ORAL at 04:09

## 2024-09-27 RX ADMIN — ISOSORBIDE MONONITRATE 60 MG: 30 TABLET, EXTENDED RELEASE ORAL at 10:09

## 2024-09-27 RX ADMIN — ALUMINUM HYDROXIDE, MAGNESIUM HYDROXIDE, AND SIMETHICONE 30 ML: 1200; 120; 1200 SUSPENSION ORAL at 09:09

## 2024-09-27 RX ADMIN — ALUMINUM HYDROXIDE, MAGNESIUM HYDROXIDE, AND SIMETHICONE 30 ML: 1200; 120; 1200 SUSPENSION ORAL at 05:09

## 2024-09-27 NOTE — PLAN OF CARE
Problem: Adult Inpatient Plan of Care  Goal: Plan of Care Review  Outcome: Progressing  Goal: Patient-Specific Goal (Individualized)  Outcome: Progressing  Goal: Absence of Hospital-Acquired Illness or Injury  Outcome: Progressing  Intervention: Identify and Manage Fall Risk  Flowsheets (Taken 9/27/2024 0558)  Safety Promotion/Fall Prevention:   assistive device/personal item within reach   side rails raised x 2   nonskid shoes/socks when out of bed   instructed to call staff for mobility   patient expresses understanding of fall risk and prevention  Intervention: Prevent Skin Injury  Flowsheets (Taken 9/27/2024 0558)  Body Position:   position changed independently   weight shifting  Skin Protection: incontinence pads utilized  Device Skin Pressure Protection: adhesive use limited     Problem: Infection  Goal: Absence of Infection Signs and Symptoms  Outcome: Progressing     Problem: Diabetes Comorbidity  Goal: Blood Glucose Level Within Targeted Range  Outcome: Progressing  Intervention: Monitor and Manage Glycemia  Flowsheets (Taken 9/27/2024 0558)  Glycemic Management: blood glucose monitored   Pt AAOx4. Vital signs as charted. Safety measures in place. Surgical sites CDI. PRN medication administered for c/o pain. Benoit d/c, voiding without difficulty via purewick. No c/o numbness or tingling. Wound vac in place, no output. No signs of distress. Pt resting, no falls or injuries at this time.

## 2024-09-27 NOTE — PROGRESS NOTES
"[facility] Medicine  Progress Note    Patient Name: Lorena Contreras  MRN: [id number]  Patient Class: IP- Inpatient   Admission Date: 9/24/2024  Length of Stay: 2 days  Attending Physician: Rupal Ochoa MD  Primary Care Provider: Jorge Paris MD        Subjective:     Principal Problem:Wound dehiscence        HPI:  Lorena Contreras is a 72 yo F with PMHx of  DM2, Fibromyalgia, lymphoma s/p chemo, HTN, HLD, CVA, MI, CAD s/p PCIs, CKD3b, HFpEF, and anemia who presented to ED for R ankle wound dehiscence. The patient reports that on 8/14/24, she was walking into a [employer] when she lost her balance, fell, and twisted her ankle. She sustained a pelvic fracture and a right pilon fracture, requiring surgery at [facility] later that day. Following the fall, she experienced urinary retention and had an indwelling catheter placed. While at the SNF, the patient reports significant progress, noting that her injuries were healing well, and she regained some mobility. Her wound was evaluated, and sutures were removed on 9/12/24. The patient was discharged home four days ago but describes her experience as "miserable," citing a lack of mobility aids provided for home use. At SNF, she had not been bearing weight on her RLE. She reports while attempting to stand on her RLE with home health her injury opened up. Since then, she has had increased swelling to her R ankle and associated generalized weakness and fatigue, decreased appetite, constipation, SOB (when anxious) and nausea. She denies fevers, chills, rhinorrhea, sore throat, cough, hemoptysis, chest pain, vomiting, diarrhea, or blood in her stool.     ED Course: AFVSS. CBC significant hgb 7.9 (down from 11.8 on month ago). No leukocytosis. CMP with Cr 2.0 (at baseline), transaminitis , , and . BG initially 232, but now 91. CRP 93 and ESR 49. Lactate 0.91. R ankle XR with stable hardware and no acute fx or dislocation, but " concerning for cellulitis. Ortho consulted and planning for I&D in AM. Given IV zofran. Placed in observation with HM.       Overview/Hospital Course:  Orthopedics consulted and patient being taken to OR today for incision and drainage and washout of right ankle surgical site for wound dehiscence. Patient with no obvious clinical signs of infection.     Interval History: reports loose stool still and immodium prn started and likely contributor to UTI as was having some loose stool prior to admit and endorses burnign with urination and symptomatic UTI with ecoli on final CX now. Surgical CX without growht, op note awaiting but ortho note says serosanginous fluid seen. Clarified WB status as ortho went back and forth but now they said defnitely stay NWB until at least 11/1 and PT/OT updated and will update orders also.  Plan to tx 1 U today given Hg lower 7s still and then will convert back to brillinta and eliquis as placed on asa but was on the other 2 right now (asked ortho re this yesterday and they were awaiting angelki discussion but if no plans for further OR procedurse will need to resume ppx and go back to the antiplatelet she was on as holding asa until eliquis was off). Still on oxygen and diuresing well with IV lasix as some slight overload on admit. Ast/alt still mildly elevated and watching. BP improving with hydralazine and go to 75 mg for better control. Glucose <100 this AM and titrate down levemir for tonight. Updated daughter at bedside this morning as well. She reports she is not interested in SNF as out of days and had bad experience also. Daughter aware, and has been workign with her and has DME ath ome but fell after 1 day at home. Misses her cats she has at home. ID consulted given concern for infection, ortho notes report ID consulted yesterday but they were not so consulted today for this.     Review of Systems   Constitutional:  Negative for chills and fever.   Respiratory:  Negative for  cough and shortness of breath.    Cardiovascular:  Negative for chest pain, palpitations and leg swelling.   Gastrointestinal:  Negative for abdominal pain, nausea and vomiting.   Genitourinary:  Negative for difficulty urinating.   Musculoskeletal:  Negative for arthralgias, joint swelling and myalgias.   Skin:  Positive for wound (Right ankle surgical wound).   Neurological:  Positive for weakness (Generalized). Negative for dizziness and light-headedness.   Psychiatric/Behavioral:  Negative for confusion and hallucinations.      Objective:     Vital Signs (Most Recent):  Temp: 97.8 °F (36.6 °C) (09/25/24 0731)  Pulse: 97 (09/25/24 1122)  Resp: 18 (09/25/24 0731)  BP: 157/69 (09/25/24 0731)  SpO2: 93 % (09/25/24 0731) on room air Vital Signs (24h Range):  Temp:  [97.9 °F (36.6 °C)-98.8 °F (37.1 °C)] 98.8 °F (37.1 °C)  Pulse:  [74-98] 93  Resp:  [16-20] 20  SpO2:  [91 %-100 %] 97 %  BP: (139-179)/(63-74) 163/69     Weight: 85.4 kg (188 lb 4.4 oz)  Body mass index is 27.01 kg/m².    Intake/Output Summary (Last 24 hours) at 9/27/2024 1206  Last data filed at 9/27/2024 0432  Gross per 24 hour   Intake --   Output 2575 ml   Net -2575 ml         Physical Exam  Vitals and nursing note reviewed.   Constitutional:       General: She is awake. She is not in acute distress.     Appearance: Normal appearance. She is normal weight. She is not ill-appearing.      Comments: Patient sitting up in bed in no distress and very comfortable. Patient awake and alert and oriented x 4.    Eyes:      Conjunctiva/sclera: Conjunctivae normal.   Cardiovascular:      Rate and Rhythm: Normal rate and regular rhythm.      Heart sounds: Normal heart sounds. No murmur heard.     No friction rub. No gallop.   Pulmonary:      Effort: Pulmonary effort is normal. No respiratory distress.      Breath sounds: Normal breath sounds. No wheezing.      Comments: On oxygen, very mild crackles  Abdominal:      General: Abdomen is flat. Bowel sounds are  "normal. There is no distension.      Palpations: Abdomen is soft.      Tenderness: There is no abdominal tenderness.   Musculoskeletal:      Right lower leg: No edema.      Left lower leg: No edema.      Comments: Right ankle wound vac   Skin:     General: Skin is warm.      Findings: No erythema.      Comments: Bluish coloration to groin areas related to application of Gentian violet   Neurological:      Mental Status: She is alert and oriented to person, place, and time.   Psychiatric:         Mood and Affect: Mood normal.         Behavior: Behavior normal. Behavior is cooperative.         Thought Content: Thought content normal.         Judgment: Judgment normal.             Significant Labs: A1C:   Recent Labs   Lab 07/10/24  1105   HGBA1C 8.2*     CBC:   Recent Labs   Lab 09/26/24  0447 09/27/24  0400   WBC 7.79 8.17   HGB 7.3* 7.3*   HCT 23.5* 24.2*    224     CMP:   Recent Labs   Lab 09/26/24 0447 09/27/24  0400    138   K 4.2 4.2    101   CO2 26 29   GLU 93 88   BUN 32* 30*   CREATININE 1.9* 1.9*   CALCIUM 8.5* 8.5*   PROT 5.9* 5.8*   ALBUMIN 2.2* 2.1*   BILITOT 0.4 0.3   ALKPHOS 377* 342*   AST 60* 61*   ALT 85* 69*   ANIONGAP 7* 8     Cardiac Markers:   No results for input(s): "CKMB", "MYOGLOBIN", "BNP", "TROPISTAT" in the last 48 hours.      Magnesium:   Recent Labs   Lab 09/26/24  0447 09/27/24  0400   MG 1.7 1.9     POCT Glucose:   Recent Labs   Lab 09/26/24  1527 09/26/24  2126 09/27/24  0718   POCTGLUCOSE 138* 104 97     Urine Studies:   No results for input(s): "COLORU", "APPEARANCEUA", "PHUR", "SPECGRAV", "PROTEINUA", "GLUCUA", "KETONESU", "BILIRUBINUA", "OCCULTUA", "NITRITE", "UROBILINOGEN", "LEUKOCYTESUR", "RBCUA", "WBCUA", "BACTERIA", "SQUAMEPITHEL", "HYALINECASTS" in the last 48 hours.    Invalid input(s): "WRIGHTSUR"      Significant Imaging: I have reviewed all pertinent imaging results/findings within the past 24 hours.    Assessment/Plan:      * Wound dehiscence, right " ankle  Closed right pilon fracture s/p ORIF on 8/14/2024  72 yo female with history of a fall resulting in a pilon fracture to the right ankle on 8/14/24 s/p ORIF. Sutures removed on 09/12/2024 in Ortho clinic. Presented to hospital for swelling and wound dehiscence to right ankle. No leukocytosis. CRP 9.3 and ESR 49. X-ray of right ankle on admit showed Orthopedic hardware intact with no acute fracture or soft tissue swelling.    - Orthopedics consulted in ED:       - Wound irrigated and soft dressings applied in ED.  - Patient non weight bearing to right lower extremity as per Ortho.   - Multimodal pain regimen. Avoid Tylenol as AST and ALT elevated. Oxycodone IR po prn for breakthrough pain.   - Plan for irrigation and debridement of right ankle wound in OR today (9/25/24) with Dr. Davalos and Orthopedics with vac placed and full op note still pending from 9/25.  -vanc/rocephin for coverage with serosanginous drainage reported and ortho note reports ID consulted but they were not so consulted 9/27 given ortho feels concern for infectious given this. Willl f/u recs   -NWB until at least 11/1 per ortho  -was on eliquis until oct 26th post pelvic/ankel fx for ppx, will ask ortho if okay to resume now and they were asking jaed 9/26 and hadnt given okay to start yet, will further clarify if okay now  Rec for SNF but she declines as out of days and misses her cats and had bad experience. Family aware       Diarrhea  Present on admit. Prn immodium      Acute blood loss anemia  Anemia is likely due to acute blood loss which was from fx and surgery . Most recent hemoglobin and hematocrit are listed below.  Recent Labs     09/25/24  0248 09/26/24  0447 09/27/24  0400   HGB 7.4* 7.3* 7.3*   HCT 24.1* 23.5* 24.2*       Plan  - Monitor serial CBC: Daily  - Transfuse PRBC if patient becomes hemodynamically unstable, symptomatic or H/H drops below 7/21. And will tx 1 U on 9/27 given hovering at 7.3 now  - Patient has not  received any PRBC transfusions to date  - Patient's anemia is currently stable      Closed right pilon fracture  - see wound dehiscence above     Transaminitis  - Patient on admit labs with , , and  and normal T. Jc.  - Patient denies abdominal pain or vomiting.  - Patient on Lipitor and Tylenol at home and either medication can effect liver so will hold both meds- had similar issue last admit and improved with holding statin  - Acute hepatitis panel on admit negative.   - Trend daily CMP.  - No signs of liver failure. Do not suspect underlying liver disease and likely medication effect.   -improving ast 60, alt 85 and now 85/69 and watch    Stage 3b chronic kidney disease  Creatine stable and at baseline BMP reviewed- noted Estimated Creatinine Clearance: 29.8 mL/min (A) (based on SCr of 2 mg/dL (H)). according to latest data. Based on current GFR, CKD stage is stage 3b. Baseline Cr 1.7-2.0. Monitor UOP and serial BMP and adjust therapy as needed. Renally dose meds. Avoid nephrotoxic medications and procedures.      Acute cystitis without hematuria  - U/A on admit with > 100 WBC and many bacteria consistent with UTI. Urine culture sent and patient started on IV Ceftriaxone 1 gram daily to treat + vanc given recent semi resistant ecoli + e faecalis last month   - Follow-up urine culture results.- with ecoli and on rocephin    Type 2 diabetes mellitus with stage 3b chronic kidney disease, with long-term current use of insulin  Patient's FSGs are controlled on current medication regimen. Patient on Lantus insulin 19 units in the evening to treat at home.   Last A1c reviewed-   Lab Results   Component Value Date    HGBA1C 8.2 (H) 07/10/2024     Most recent fingerstick glucose reviewed-   Recent Labs   Lab 09/26/24  1527 09/26/24  2126 09/27/24  0718   POCTGLUCOSE 138* 104 97       Current correctional scale  Low  Maintain anti-hyperglycemic dose as follows-   Antihyperglycemics (From admission,  onward)      Start     Stop Route Frequency Ordered    09/27/24 2100  insulin glargine U-100 (Lantus) pen 16 Units         -- SubQ Nightly 09/27/24 0821    09/25/24 0008  insulin aspart U-100 pen 0-5 Units         -- SubQ Before meals & nightly PRN 09/24/24 2308          Hold Oral hypoglycemics while patient is in the hospital.  Target blood sugars 140-180 in hospital.  Monitor blood sugars with meals and at bedtime in hospital.   Diabetic diet post-op.   -watch closely as labile and had more lows than highs last admit, and took ozempic home med when was ath ome briefly which decreases her snacking. Dec levemir on 9/27 as glucose below 100     Acute on chronic diastolic heart failure  Patient is identified as having Diastolic (HFpEF) heart failure that is Acute on chronic. CHF is currently uncontrolled due to Pulmonary edema/pleural effusion on CXR. CXR on admit with mild pulmonary congestion and BNP elevated at 1373 on admit. Patient with mild excerebration. Latest ECHO performed and demonstrates- Results for orders placed during the hospital encounter of 11/03/23    Echo    Interpretation Summary    Left Ventricle: The left ventricle is normal in size. Increased wall thickness. Moderate septal thickening. Normal wall motion. There is normal systolic function with a visually estimated ejection fraction of 65 - 70%. Grade I diastolic dysfunction.    Right Ventricle: Normal right ventricular cavity size. Systolic function is normal.    Left Atrium: Left atrium is severely dilated.    Aortic Valve: There is moderate stenosis. Aortic valve area by VTI is 1.02 cm². Aortic valve peak velocity is 2.59 m/s. Mean gradient is 18 mmHg. The dimensionless index is 0.32.    Mitral Valve: There is mild regurgitation.    Tricuspid Valve: There is mild regurgitation.    Pulmonary Artery: The estimated pulmonary artery systolic pressure is 35 mmHg.    IVC/SVC: Normal venous pressure at 3 mmHg.    - started on IV Lasix 40 mg BID to  treat mild heart failure. Excerebration very mild so okay to proceed with surgery as patient not hypoxic and not having any respiratory distress. Watch given mild cr high limit normal on admit but improving 9/26.  - Continue home Imdur and Toprol XL for chronic heart failure and monitor clinical status closely. Monitor on telemetry.   - Patient is off CHF pathway.    - Monitor strict Is&Os and daily weights.    - Place on fluid restriction of 1.5 L. Cardiology has not been consulted.   - Continue to stress to patient importance of self efficacy and  on diet for CHF.   - Last BNP reviewed- and noted below   Recent Labs   Lab 09/25/24  0248   BNP 1,373*     Still on oxygen 927 with signs of mild overload so keep on IV for now    Iron deficiency anemia  Acute blood loss anemia  Anemia is likely due to Iron deficiency and acute blood loss from right ankle. Patient with baseline iron deficiency anemia with baseline Hgb 9-10. Hgb 7.9 on admit and patient reports blood loss from ankle at home when wound dehisced causing worsening anemia. Hgb 7.9 on admit and down to 7.4 on 9/25. Patient typed and crossed and blood consent obtained and 7/3 now, will likely need transfusion in next day or so given lower 7.s watch to ensure no other bleeding signs.  Recent Labs     09/24/24  1622 09/25/24  0248 09/26/24  0447   HGB 7.9* 7.4* 7.3*   HCT 25.9* 24.1* 23.5*       Plan  - Monitor serial CBC: Daily  - Transfuse PRBC if patient becomes hemodynamically unstable, symptomatic or H/H drops below 7/21.  - Patient has not received any PRBC transfusions to date      Coronary artery disease of native artery of native heart with stable angina pectoris  Patient with known CAD s/p stent placement, which is controlled. Monitor for S/Sx of angina/ACS. Continue to monitor on telemetry. Last discharge was placed on brillinta as home cards told her never go off of it and had asa held until oct 26 when goes off eilquis to avoid triple  therapy  Had asa started 9/25 post op, will plan to adjust back to brillinta given this over weekedn as hg lower 7's and will do this slowly while titrating back on to regimen was sent home last admit (eliquis until oct 26th and brillinta)  -Had stents placed in 2018 to rca and 2020 to 2 areas per dr cervantes note from [employer], had stress in 2022 that was negative for ischemia. She said he wanted to recheck another one recently but insurance did not cover. She said had aspirin allergy testing before it was restarted due to prev intolerance after a stent   -likely titrate back to brillinta this weekend after hg improves with tx on 9/27    Mixed hyperlipidemia  Chronic and controlled. Hold home Lipitor for now due elevated LFT's.     Follicular lymphoma  S/p chemo in 2010. In remission.     Primary hypertension  Chronic, controlled. Latest blood pressure and vitals reviewed-     Temp:  [97.9 °F (36.6 °C)-98.8 °F (37.1 °C)]   Pulse:  [74-98]   Resp:  [16-20]   BP: (139-179)/(63-74)   SpO2:  [91 %-100 %] .   Home meds for hypertension were reviewed and noted below.   Hypertension Medications               isosorbide mononitrate (IMDUR) 60 MG 24 hr tablet Take 1 tablet (60 mg total) by mouth once daily.    metoprolol succinate (TOPROL-XL) 25 MG 24 hr tablet Take 1 tablet (25 mg total) by mouth once daily.            While in the hospital, will manage blood pressure as follows; Continue home antihypertensive regimen to treat in hospital.  -BP higher 9/26, had hydralazine added to isorbide/metoprolol last admit so start hydralazine now as BP 180s and she reporst pain minimal  -improving 9/27    Will utilize p.r.n. blood pressure medication only if patient's blood pressure greater than 180/110 and she develops symptoms such as worsening chest pain or shortness of breath.      VTE Risk Mitigation (From admission, onward)           Ordered     IP VTE HIGH RISK PATIENT  Once         09/24/24 6108     Reason for No  Pharmacological VTE Prophylaxis  Once        Question:  Reasons:  Answer:  Already adequately anticoagulated on oral Anticoagulants    09/24/24 2231                    Discharge Planning   MIKHAIL: 9/28/2024     Code Status: Full Code   Is the patient medically ready for discharge?:     Reason for patient still in hospital (select all that apply): Patient trending condition  Discharge Plan A: Home with family, Home Health                  Rupal Ochoa MD  Department of Hospital Medicine   [facility] - Surgery

## 2024-09-27 NOTE — ASSESSMENT & PLAN NOTE
Patient is identified as having Diastolic (HFpEF) heart failure that is Acute on chronic. CHF is currently uncontrolled due to Pulmonary edema/pleural effusion on CXR. CXR on admit with mild pulmonary congestion and BNP elevated at 1373 on admit. Patient with mild excerebration. Latest ECHO performed and demonstrates- Results for orders placed during the hospital encounter of 11/03/23    Echo    Interpretation Summary    Left Ventricle: The left ventricle is normal in size. Increased wall thickness. Moderate septal thickening. Normal wall motion. There is normal systolic function with a visually estimated ejection fraction of 65 - 70%. Grade I diastolic dysfunction.    Right Ventricle: Normal right ventricular cavity size. Systolic function is normal.    Left Atrium: Left atrium is severely dilated.    Aortic Valve: There is moderate stenosis. Aortic valve area by VTI is 1.02 cm². Aortic valve peak velocity is 2.59 m/s. Mean gradient is 18 mmHg. The dimensionless index is 0.32.    Mitral Valve: There is mild regurgitation.    Tricuspid Valve: There is mild regurgitation.    Pulmonary Artery: The estimated pulmonary artery systolic pressure is 35 mmHg.    IVC/SVC: Normal venous pressure at 3 mmHg.    - started on IV Lasix 40 mg BID to treat mild heart failure. Excerebration very mild so okay to proceed with surgery as patient not hypoxic and not having any respiratory distress. Watch given mild cr high limit normal on admit but improving 9/26.  - Continue home Imdur and Toprol XL for chronic heart failure and monitor clinical status closely. Monitor on telemetry.   - Patient is off CHF pathway.    - Monitor strict Is&Os and daily weights.    - Place on fluid restriction of 1.5 L. Cardiology has not been consulted.   - Continue to stress to patient importance of self efficacy and  on diet for CHF.   - Last BNP reviewed- and noted below   Recent Labs   Lab 09/25/24  0248   BNP 1,373*     Still on oxygen 927 with  signs of mild overload so keep on IV for now

## 2024-09-27 NOTE — ASSESSMENT & PLAN NOTE
Lorena Contreras is a 73 y.o. female s/p ORIF right pilon fx presents with right medial ankle wound dehiscence.  Presented afebrile with labs significant for WBC 8.17, ESR  , CRP  .  On physical exam, medial ankle surgical wound noted to be open and draining serosanguinous fluid. Incision was irrigated with 4 L of normal saline. Sterile saline soaked gauze and soft dressings were applied to wound.  Patient's extremity was elevated.     S/p I&D R ankle 9/25/24.    Plan:  - Admit to    - Abx: vanc and rocephin  - ID consulted, appreciate recs  - Cultures NGTD  - NWB RLE x 10 weeks from date of ORIF (8/14/24)  - Prevena at 75mmHg, f/u 1 week for removal  - Kaycee: MARYBETH

## 2024-09-27 NOTE — ASSESSMENT & PLAN NOTE
- U/A on admit with > 100 WBC and many bacteria consistent with UTI. Urine culture sent and patient started on IV Ceftriaxone 1 gram daily to treat + vanc given recent semi resistant ecoli + e faecalis last month   - Follow-up urine culture results.- with ecoli and on rocephin

## 2024-09-27 NOTE — PT/OT/SLP PROGRESS
Physical Therapy      Patient Name:  Lorena Contreras   MRN:  [id number]    Patient not seen today secondary to pt refused due to having multiple episodes of diarrhea. Family present and aware. Danuta Waldrop PT 9/27/24

## 2024-09-27 NOTE — ASSESSMENT & PLAN NOTE
Patient's FSGs are controlled on current medication regimen. Patient on Lantus insulin 19 units in the evening to treat at home.   Last A1c reviewed-   Lab Results   Component Value Date    HGBA1C 8.2 (H) 07/10/2024     Most recent fingerstick glucose reviewed-   Recent Labs   Lab 09/26/24  1527 09/26/24  2126 09/27/24  0718   POCTGLUCOSE 138* 104 97       Current correctional scale  Low  Maintain anti-hyperglycemic dose as follows-   Antihyperglycemics (From admission, onward)      Start     Stop Route Frequency Ordered    09/27/24 2100  insulin glargine U-100 (Lantus) pen 16 Units         -- SubQ Nightly 09/27/24 0821    09/25/24 0008  insulin aspart U-100 pen 0-5 Units         -- SubQ Before meals & nightly PRN 09/24/24 2308          Hold Oral hypoglycemics while patient is in the hospital.  Target blood sugars 140-180 in hospital.  Monitor blood sugars with meals and at bedtime in hospital.   Diabetic diet post-op.   -watch closely as labile and had more lows than highs last admit, and took ozempic home med when was ath ome briefly which decreases her snacking. Dec levemir on 9/27 as glucose below 100

## 2024-09-27 NOTE — ASSESSMENT & PLAN NOTE
Patient with known CAD s/p stent placement, which is controlled. Monitor for S/Sx of angina/ACS. Continue to monitor on telemetry. Last discharge was placed on brillinta as home cards told her never go off of it and had asa held until oct 26 when goes off eilquis to avoid triple therapy  Had asa started 9/25 post op, will plan to adjust back to brillinta given this over weekedn as hg lower 7's and will do this slowly while titrating back on to regimen was sent home last admit (eliquis until oct 26th and brillinta)  -Had stents placed in 2018 to rca and 2020 to 2 areas per dr cervantes note from [employer], had stress in 2022 that was negative for ischemia. She said he wanted to recheck another one recently but insurance did not cover. She said had aspirin allergy testing before it was restarted due to prev intolerance after a stent   -likely titrate back to brillinta this weekend after hg improves with tx on 9/27

## 2024-09-27 NOTE — HPI
oLrena Contreras is a 73 year old with poorly controlled DM (A1c 8.2), HTN, CAD s/p PCIs, CKD3, HFpEF, history of CVA, history of lymphoma s/p chemo, and anemia, who is s/p ORIF of right pilon fracture (8/14) and ORIF right acetabulum fracture (8/16) after a fall, c/b urinary retention requiring indwelling chong.  Initially d/c to SNF, then d/c to home four days prior to admission. On 9/22 she bumped the ankle while getting into the car and the wound opened.  She presented to ED on 9/23 and is now s/p I&D on 9/25.  ID consulted for antibiotic recommendations.

## 2024-09-27 NOTE — ASSESSMENT & PLAN NOTE
- Patient on admit labs with , , and  and normal T. Jc.  - Patient denies abdominal pain or vomiting.  - Patient on Lipitor and Tylenol at home and either medication can effect liver so will hold both meds- had similar issue last admit and improved with holding statin  - Acute hepatitis panel on admit negative.   - Trend daily CMP.  - No signs of liver failure. Do not suspect underlying liver disease and likely medication effect.   -improving ast 60, alt 85 and now 85/69 and watch

## 2024-09-27 NOTE — PLAN OF CARE
Problem: Adult Inpatient Plan of Care  Goal: Plan of Care Review  Outcome: Progressing     Problem: Adult Inpatient Plan of Care  Goal: Optimal Comfort and Wellbeing  Outcome: Progressing     Problem: Infection  Goal: Absence of Infection Signs and Symptoms  Outcome: Progressing     Problem: Acute Kidney Injury/Impairment  Goal: Improved Oral Intake  Outcome: Progressing     Problem: Wound  Goal: Optimal Functional Ability  Outcome: Progressing     Problem: Wound  Goal: Improved Oral Intake  Outcome: Progressing     Problem: Skin Injury Risk Increased  Goal: Skin Health and Integrity  Outcome: Progressing     Problem: Fall Injury Risk  Goal: Absence of Fall and Fall-Related Injury  Outcome: Progressing   Patient lying supine in bed watching television. NAD noted. Safety measures in place.

## 2024-09-27 NOTE — SUBJECTIVE & OBJECTIVE
Principal Problem:Wound dehiscence    Principal Orthopedic Problem: as above, s/p I&D right ankle 9/25    Interval History: Patient seen and examined at bedside. NAEON. VSS, afebrile. Pain moderately well controlled. Patient has been having some loose stools and therefore did not work with therapy today. Hgb 7.3. Given 1u pRBC today.  Cultures NGTD.           Review of patient's allergies indicates:   Allergen Reactions    Novolin 70/30 (semi-synthetic) Nausea And Vomiting     Severe vomiting on Relion 70/30    Sulfa (sulfonamide antibiotics) Anaphylaxis    Talwin [pentazocine lactate] Anaphylaxis    Victoza [liraglutide] Nausea And Vomiting    Glipizide Nausea Only    Citric acid     Codeine     Influenza virus vaccines Hives    Iodine and iodide containing products Hives    Levetiracetam Itching    Neurontin [gabapentin]      Possible associated myoclonic jerk    Rituxan [rituximab] Hives    Zoloft [sertraline] Nausea And Vomiting       Current Facility-Administered Medications   Medication    0.9%  NaCl infusion (for blood administration)    albuterol-ipratropium 2.5 mg-0.5 mg/3 mL nebulizer solution 3 mL    aluminum-magnesium hydroxide-simethicone 200-200-20 mg/5 mL suspension 30 mL    aluminum-magnesium hydroxide-simethicone 200-200-20 mg/5 mL suspension 30 mL    apixaban tablet 2.5 mg    cefTRIAXone (Rocephin) 1 g in D5W 100 mL IVPB (MB+)    dextrose 10% bolus 125 mL 125 mL    dextrose 10% bolus 250 mL 250 mL    FLUoxetine capsule 40 mg    furosemide injection 40 mg    glucagon (human recombinant) injection 1 mg    glucose chewable tablet 16 g    glucose chewable tablet 24 g    hydrALAZINE tablet 75 mg    hydrOXYzine HCL tablet 25 mg    insulin aspart U-100 pen 0-5 Units    insulin glargine U-100 (Lantus) pen 16 Units    isosorbide mononitrate 24 hr tablet 60 mg    loperamide capsule 2 mg    melatonin tablet 6 mg    methocarbamoL tablet 500 mg    metoprolol succinate (TOPROL-XL) 24 hr tablet 25 mg    naloxone  "0.4 mg/mL injection 0.02 mg    ondansetron tablet 4 mg    oxyCODONE immediate release tablet 5 mg    polyethylene glycol packet 17 g    prochlorperazine injection Soln 5 mg    QUEtiapine tablet 50 mg    simethicone chewable tablet 80 mg    sodium chloride 0.9% flush 5 mL    sucralfate 100 mg/mL suspension 1 g    [START ON 9/28/2024] ticagrelor tablet 90 mg    vancomycin - pharmacy to dose     Objective:     Vital Signs (Most Recent):  Temp: 97.5 °F (36.4 °C) (09/27/24 1455)  Pulse: 92 (09/27/24 1455)  Resp: 18 (09/27/24 1512)  BP: (!) 153/66 (09/27/24 1455)  SpO2: 97 % (09/27/24 1455) Vital Signs (24h Range):  Temp:  [97.5 °F (36.4 °C)-98.8 °F (37.1 °C)] 97.5 °F (36.4 °C)  Pulse:  [74-95] 92  Resp:  [16-20] 18  SpO2:  [91 %-100 %] 97 %  BP: (139-176)/(63-73) 153/66     Weight: 85.4 kg (188 lb 4.4 oz)  Height: 5' 10" (177.8 cm)  Body mass index is 27.01 kg/m².      Intake/Output Summary (Last 24 hours) at 9/27/2024 1751  Last data filed at 9/27/2024 1709  Gross per 24 hour   Intake 450 ml   Output 1975 ml   Net -1525 ml        Ortho/SPM Exam  A&O x 3  Regular Rate  Non-Labored Respirations    RLE:  Wound vac with good seal  Ace wrap in place  Fires Quad/TA/EHL/GSC  SILT  Compartments soft  Brisk cap refill         Significant Labs: All pertinent labs within the past 24 hours have been reviewed.    Significant Imaging: I have reviewed all pertinent imaging results/findings.  "

## 2024-09-27 NOTE — ASSESSMENT & PLAN NOTE
Closed right pilon fracture s/p ORIF on 8/14/2024  74 yo female with history of a fall resulting in a pilon fracture to the right ankle on 8/14/24 s/p ORIF. Sutures removed on 09/12/2024 in Ortho clinic. Presented to hospital for swelling and wound dehiscence to right ankle. No leukocytosis. CRP 9.3 and ESR 49. X-ray of right ankle on admit showed Orthopedic hardware intact with no acute fracture or soft tissue swelling.    - Orthopedics consulted in ED:       - Wound irrigated and soft dressings applied in ED.  - Patient non weight bearing to right lower extremity as per Ortho.   - Multimodal pain regimen. Avoid Tylenol as AST and ALT elevated. Oxycodone IR po prn for breakthrough pain.   - Plan for irrigation and debridement of right ankle wound in OR today (9/25/24) with Dr. Davalos and Orthopedics with vac placed and full op note still pending from 9/25.  -vanc/rocephin for coverage with serosanginous drainage reported and ortho note reports ID consulted but they were not so consulted 9/27 given ortho feels concern for infectious given this. Willl f/u recs   -NWB until at least 11/1 per ortho  -was on eliquis until oct 26th post pelvic/ankel fx for ppx, will ask ortho if okay to resume now and they were asking jade 9/26 and hadnt given okay to start yet, will further clarify if okay now  Rec for SNF but she declines as out of days and misses her cats and had bad experience. Family aware

## 2024-09-27 NOTE — PROGRESS NOTES
[facility]  Orthopedics  Progress Note    Patient Name: Lorena Contreras  MRN: [id number]  Admission Date: 9/24/2024  Hospital Length of Stay: 2 days  Attending Provider: Rupal Ochoa MD  Primary Care Provider: Jorge Paris MD  Follow-up For: Procedure(s) (LRB):  IRRIGATION AND DEBRIDEMENT, LOWER EXTREMITY; slider table, supine, bone foam, cysto tubing, 6L NS/dakins/peroxide, culture swabs (Right)  APPLICATION, WOUND VAC (Right)  DEBRIDEMENT, LOCAL FLAP (Right)    Post-Operative Day: 2 Days Post-Op  Subjective:     Principal Problem:Wound dehiscence    Principal Orthopedic Problem: as above, s/p I&D right ankle 9/25    Interval History: Patient seen and examined at bedside. NAEON. VSS, afebrile. Pain moderately well controlled. Patient has been having some loose stools and therefore did not work with therapy today. Hgb 7.3. Given 1u pRBC today.  Cultures NGTD.           Review of patient's allergies indicates:   Allergen Reactions    Novolin 70/30 (semi-synthetic) Nausea And Vomiting     Severe vomiting on Relion 70/30    Sulfa (sulfonamide antibiotics) Anaphylaxis    Talwin [pentazocine lactate] Anaphylaxis    Victoza [liraglutide] Nausea And Vomiting    Glipizide Nausea Only    Citric acid     Codeine     Influenza virus vaccines Hives    Iodine and iodide containing products Hives    Levetiracetam Itching    Neurontin [gabapentin]      Possible associated myoclonic jerk    Rituxan [rituximab] Hives    Zoloft [sertraline] Nausea And Vomiting       Current Facility-Administered Medications   Medication    0.9%  NaCl infusion (for blood administration)    albuterol-ipratropium 2.5 mg-0.5 mg/3 mL nebulizer solution 3 mL    aluminum-magnesium hydroxide-simethicone 200-200-20 mg/5 mL suspension 30 mL    aluminum-magnesium hydroxide-simethicone 200-200-20 mg/5 mL suspension 30 mL    apixaban tablet 2.5 mg    cefTRIAXone (Rocephin) 1 g in D5W 100 mL IVPB (MB+)    dextrose 10% bolus 125 mL 125 mL     "dextrose 10% bolus 250 mL 250 mL    FLUoxetine capsule 40 mg    furosemide injection 40 mg    glucagon (human recombinant) injection 1 mg    glucose chewable tablet 16 g    glucose chewable tablet 24 g    hydrALAZINE tablet 75 mg    hydrOXYzine HCL tablet 25 mg    insulin aspart U-100 pen 0-5 Units    insulin glargine U-100 (Lantus) pen 16 Units    isosorbide mononitrate 24 hr tablet 60 mg    loperamide capsule 2 mg    melatonin tablet 6 mg    methocarbamoL tablet 500 mg    metoprolol succinate (TOPROL-XL) 24 hr tablet 25 mg    naloxone 0.4 mg/mL injection 0.02 mg    ondansetron tablet 4 mg    oxyCODONE immediate release tablet 5 mg    polyethylene glycol packet 17 g    prochlorperazine injection Soln 5 mg    QUEtiapine tablet 50 mg    simethicone chewable tablet 80 mg    sodium chloride 0.9% flush 5 mL    sucralfate 100 mg/mL suspension 1 g    [START ON 9/28/2024] ticagrelor tablet 90 mg    vancomycin - pharmacy to dose     Objective:     Vital Signs (Most Recent):  Temp: 97.5 °F (36.4 °C) (09/27/24 1455)  Pulse: 92 (09/27/24 1455)  Resp: 18 (09/27/24 1512)  BP: (!) 153/66 (09/27/24 1455)  SpO2: 97 % (09/27/24 1455) Vital Signs (24h Range):  Temp:  [97.5 °F (36.4 °C)-98.8 °F (37.1 °C)] 97.5 °F (36.4 °C)  Pulse:  [74-95] 92  Resp:  [16-20] 18  SpO2:  [91 %-100 %] 97 %  BP: (139-176)/(63-73) 153/66     Weight: 85.4 kg (188 lb 4.4 oz)  Height: 5' 10" (177.8 cm)  Body mass index is 27.01 kg/m².      Intake/Output Summary (Last 24 hours) at 9/27/2024 1751  Last data filed at 9/27/2024 1709  Gross per 24 hour   Intake 450 ml   Output 1975 ml   Net -1525 ml        Ortho/SPM Exam  A&O x 3  Regular Rate  Non-Labored Respirations    RLE:  Wound vac with good seal  Ace wrap in place  Fires Quad/TA/EHL/GSC  SILT  Compartments soft  Brisk cap refill         Significant Labs: All pertinent labs within the past 24 hours have been reviewed.    Significant Imaging: I have reviewed all pertinent imaging " results/findings.  Assessment/Plan:     * Wound dehiscence, right ankle  Lorena Contreras is a 73 y.o. female s/p ORIF right pilon fx presents with right medial ankle wound dehiscence.  Presented afebrile with labs significant for WBC 8.17, ESR  , CRP  .  On physical exam, medial ankle surgical wound noted to be open and draining serosanguinous fluid. Incision was irrigated with 4 L of normal saline. Sterile saline soaked gauze and soft dressings were applied to wound.  Patient's extremity was elevated.     S/p I&D R ankle 9/25/24.    Plan:  - Admit to    - Abx: vanc and rocephin  - ID consulted, appreciate recs  - Cultures NGTD  - NWB RLE x 10 weeks from date of ORIF (8/14/24)  - Prevena at 75mmHg, f/u 1 week for removal  - Dean Marroquin MD  Orthopedics  [facility] - Surgery

## 2024-09-27 NOTE — PROGRESS NOTES
Pharmacokinetic Assessment Follow Up: IV Vancomycin    Vancomycin serum concentration assessment(s):    The random level was drawn correctly and can be used to guide therapy at this time. The measurement is within the desired definitive target range of 15 to 20 mcg/mL.    Vancomycin Regimen Plan:    Continue pulse dosing with vancomycin 1000 mg once now.   Draw vancomycin random on 9/27 at 1100 about 12 hours post dose.   Pharmacy will continue to follow and monitor vancomycin.    Drug levels (last 3 results):  Recent Labs   Lab Result Units 09/26/24 2029   Vancomycin, Random ug/mL 14.7     Please contact pharmacy at extension [phone] for questions regarding this assessment.    Thank you for the consult,   Marisa Mosley       Patient brief summary:  Lorena Contreras is a 73 y.o. female initiated on antimicrobial therapy with IV Vancomycin for treatment of bone/joint infection    The patient's current regimen is pulse dosing.     Drug Allergies:   Review of patient's allergies indicates:   Allergen Reactions    Novolin 70/30 (semi-synthetic) Nausea And Vomiting     Severe vomiting on Relion 70/30    Sulfa (sulfonamide antibiotics) Anaphylaxis    Talwin [pentazocine lactate] Anaphylaxis    Victoza [liraglutide] Nausea And Vomiting    Glipizide Nausea Only    Citric acid     Codeine     Influenza virus vaccines Hives    Iodine and iodide containing products Hives    Levetiracetam Itching    Neurontin [gabapentin]      Possible associated myoclonic jerk    Rituxan [rituximab] Hives    Zoloft [sertraline] Nausea And Vomiting       Actual Body Weight:   85.4 kg     Renal Function:   Estimated Creatinine Clearance: 31.3 mL/min (A) (based on SCr of 1.9 mg/dL (H)).,     Dialysis Method (if applicable):  N/A    CBC (last 72 hours):  Recent Labs   Lab Result Units 09/24/24  1622 09/25/24  0248 09/26/24  0447   WBC K/uL 9.42 9.03 7.79   Hemoglobin g/dL 7.9* 7.4* 7.3*   Hematocrit % 25.9* 24.1* 23.5*   Platelets K/uL 213 210  209   Gran % % 85.0*  --   --    Lymph % % 8.6*  --   --    Mono % % 5.0  --   --    Eosinophil % % 0.4  --   --    Basophil % % 0.6  --   --    Differential Method  Automated  --   --        Metabolic Panel (last 72 hours):  Recent Labs   Lab Result Units 09/24/24  1622 09/25/24  0028 09/25/24  0248 09/26/24  0447   Sodium mmol/L 132*  --  134* 138   Potassium mmol/L 4.5  --  4.2 4.2   Chloride mmol/L 102  --  104 105   CO2 mmol/L 22*  --  21* 26   Glucose mg/dL 232*  --  126* 93   Glucose, UA   --  Negative  --   --    BUN mg/dL 36*  --  35* 32*   Creatinine mg/dL 2.0*  --  2.0* 1.9*   Albumin g/dL 2.2*  --  2.2* 2.2*   Total Bilirubin mg/dL 0.4  --  0.4 0.4   Alkaline Phosphatase U/L 476*  --  418* 377*   AST U/L 122*  --  92* 60*   ALT U/L 145*  --  122* 85*   Magnesium mg/dL  --   --  1.7 1.7       Vancomycin Administrations:  vancomycin given in the last 96 hours                     vancomycin 750 mg in D5W 250 mL IVPB (admixture device) (mg) 750 mg New Bag 09/26/24 0838    vancomycin injection (g) 1 g Given 09/25/24 1347    vancomycin (VANCOCIN) 1,000 mg in D5W 250 mL IVPB (mg) 1,000 mg New Bag 09/25/24 1310                    Microbiologic Results:  Microbiology Results (last 7 days)       Procedure Component Value Units Date/Time    Culture, Anaerobe [[id number]] Collected: 09/25/24 1329    Order Status: Completed Specimen: Ankle, Right Updated: 09/26/24 0752     Anaerobic Culture Culture in progress    Narrative:      1. Right ankle wound -culture    Culture, Anaerobe [[id number]] Collected: 09/25/24 1329    Order Status: Completed Specimen: Ankle, Right Updated: 09/26/24 0752     Anaerobic Culture Culture in progress    Narrative:      3.. Right ankle wound -culture    Culture, Anaerobe [[id number]] Collected: 09/25/24 1329    Order Status: Completed Specimen: Ankle, Right Updated: 09/26/24 0752     Anaerobic Culture Culture in progress    Narrative:      2. . Right ankle wound -culture    Aerobic culture  [[id number]] Collected: 09/25/24 1329    Order Status: Completed Specimen: Ankle, Right Updated: 09/26/24 0734     Aerobic Bacterial Culture No growth    Narrative:      1. Right ankle wound -culture    Aerobic culture [[id number]] Collected: 09/25/24 1329    Order Status: Completed Specimen: Ankle, Right Updated: 09/26/24 0734     Aerobic Bacterial Culture No growth    Narrative:      2. Right ankle wound -culture    Aerobic culture [[id number]] Collected: 09/25/24 1329    Order Status: Completed Specimen: Ankle, Right Updated: 09/26/24 0734     Aerobic Bacterial Culture No growth    Narrative:      3.. Right ankle wound -culture    Urine culture [[id number]]  (Abnormal) Collected: 09/25/24 0028    Order Status: Completed Specimen: Urine Updated: 09/25/24 2210     Urine Culture, Routine GRAM NEGATIVE MAGI  > 100,000 cfu/ml  Identification and susceptibility pending  No other significant isolate      Narrative:      Specimen Source->Urine    Fungus culture [[id number]] Collected: 09/25/24 1329    Order Status: Sent Specimen: Ankle, Right Updated: 09/25/24 1418

## 2024-09-27 NOTE — SUBJECTIVE & OBJECTIVE
Interval History: reports loose stool still and immodium prn started and likely contributor to UTI as was having some loose stool prior to admit and endorses burnign with urination and symptomatic UTI with ecoli on final CX now. Surgical CX without growht, op note awaiting but ortho note says serosanginous fluid seen. Clarified WB status as ortho went back and forth but now they said defnitely stay NWB until at least 11/1 and PT/OT updated and will update orders also.  Plan to tx 1 U today given Hg lower 7s still and then will convert back to brillinta and eliquis as placed on asa but was on the other 2 right now (asked ortho re this yesterday and they were awaiting sugalski discussion but if no plans for further OR procedurse will need to resume ppx and go back to the antiplatelet she was on as holding asa until eliquis was off). Still on oxygen and diuresing well with IV lasix as some slight overload on admit. Ast/alt still mildly elevated and watching. BP improving with hydralazine and go to 75 mg for better control. Glucose <100 this AM and titrate down levemir for tonight. Updated daughter at bedside this morning as well. She reports she is not interested in SNF as out of days and had bad experience also. Daughter aware, and has been workign with her and has DME ath ome but fell after 1 day at home. Misses her cats she has at home. ID consulted given concern for infection, ortho notes report ID consulted yesterday but they were not so consulted today for this.     Review of Systems   Constitutional:  Negative for chills and fever.   Respiratory:  Negative for cough and shortness of breath.    Cardiovascular:  Negative for chest pain, palpitations and leg swelling.   Gastrointestinal:  Negative for abdominal pain, nausea and vomiting.   Genitourinary:  Negative for difficulty urinating.   Musculoskeletal:  Negative for arthralgias, joint swelling and myalgias.   Skin:  Positive for wound (Right ankle surgical  wound).   Neurological:  Positive for weakness (Generalized). Negative for dizziness and light-headedness.   Psychiatric/Behavioral:  Negative for confusion and hallucinations.      Objective:     Vital Signs (Most Recent):  Temp: 97.8 °F (36.6 °C) (09/25/24 0731)  Pulse: 97 (09/25/24 1122)  Resp: 18 (09/25/24 0731)  BP: 157/69 (09/25/24 0731)  SpO2: 93 % (09/25/24 0731) on room air Vital Signs (24h Range):  Temp:  [97.9 °F (36.6 °C)-98.8 °F (37.1 °C)] 98.8 °F (37.1 °C)  Pulse:  [74-98] 93  Resp:  [16-20] 20  SpO2:  [91 %-100 %] 97 %  BP: (139-179)/(63-74) 163/69     Weight: 85.4 kg (188 lb 4.4 oz)  Body mass index is 27.01 kg/m².    Intake/Output Summary (Last 24 hours) at 9/27/2024 1206  Last data filed at 9/27/2024 0432  Gross per 24 hour   Intake --   Output 2575 ml   Net -2575 ml         Physical Exam  Vitals and nursing note reviewed.   Constitutional:       General: She is awake. She is not in acute distress.     Appearance: Normal appearance. She is normal weight. She is not ill-appearing.      Comments: Patient sitting up in bed in no distress and very comfortable. Patient awake and alert and oriented x 4.    Eyes:      Conjunctiva/sclera: Conjunctivae normal.   Cardiovascular:      Rate and Rhythm: Normal rate and regular rhythm.      Heart sounds: Normal heart sounds. No murmur heard.     No friction rub. No gallop.   Pulmonary:      Effort: Pulmonary effort is normal. No respiratory distress.      Breath sounds: Normal breath sounds. No wheezing.      Comments: On oxygen, very mild crackles  Abdominal:      General: Abdomen is flat. Bowel sounds are normal. There is no distension.      Palpations: Abdomen is soft.      Tenderness: There is no abdominal tenderness.   Musculoskeletal:      Right lower leg: No edema.      Left lower leg: No edema.      Comments: Right ankle wound vac   Skin:     General: Skin is warm.      Findings: No erythema.      Comments: Bluish coloration to groin areas related to  "application of Gentian violet   Neurological:      Mental Status: She is alert and oriented to person, place, and time.   Psychiatric:         Mood and Affect: Mood normal.         Behavior: Behavior normal. Behavior is cooperative.         Thought Content: Thought content normal.         Judgment: Judgment normal.             Significant Labs: A1C:   Recent Labs   Lab 07/10/24  1105   HGBA1C 8.2*     CBC:   Recent Labs   Lab 09/26/24  0447 09/27/24  0400   WBC 7.79 8.17   HGB 7.3* 7.3*   HCT 23.5* 24.2*    224     CMP:   Recent Labs   Lab 09/26/24  0447 09/27/24  0400    138   K 4.2 4.2    101   CO2 26 29   GLU 93 88   BUN 32* 30*   CREATININE 1.9* 1.9*   CALCIUM 8.5* 8.5*   PROT 5.9* 5.8*   ALBUMIN 2.2* 2.1*   BILITOT 0.4 0.3   ALKPHOS 377* 342*   AST 60* 61*   ALT 85* 69*   ANIONGAP 7* 8     Cardiac Markers:   No results for input(s): "CKMB", "MYOGLOBIN", "BNP", "TROPISTAT" in the last 48 hours.      Magnesium:   Recent Labs   Lab 09/26/24  0447 09/27/24  0400   MG 1.7 1.9     POCT Glucose:   Recent Labs   Lab 09/26/24  1527 09/26/24  2126 09/27/24  0718   POCTGLUCOSE 138* 104 97     Urine Studies:   No results for input(s): "COLORU", "APPEARANCEUA", "PHUR", "SPECGRAV", "PROTEINUA", "GLUCUA", "KETONESU", "BILIRUBINUA", "OCCULTUA", "NITRITE", "UROBILINOGEN", "LEUKOCYTESUR", "RBCUA", "WBCUA", "BACTERIA", "SQUAMEPITHEL", "HYALINECASTS" in the last 48 hours.    Invalid input(s): "WRIGHTSUR"      Significant Imaging: I have reviewed all pertinent imaging results/findings within the past 24 hours.  "

## 2024-09-27 NOTE — ASSESSMENT & PLAN NOTE
Anemia is likely due to acute blood loss which was from fx and surgery . Most recent hemoglobin and hematocrit are listed below.  Recent Labs     09/25/24  0248 09/26/24  0447 09/27/24  0400   HGB 7.4* 7.3* 7.3*   HCT 24.1* 23.5* 24.2*       Plan  - Monitor serial CBC: Daily  - Transfuse PRBC if patient becomes hemodynamically unstable, symptomatic or H/H drops below 7/21. And will tx 1 U on 9/27 given hovering at 7.3 now  - Patient has not received any PRBC transfusions to date  - Patient's anemia is currently stable

## 2024-09-27 NOTE — ASSESSMENT & PLAN NOTE
Chronic, controlled. Latest blood pressure and vitals reviewed-     Temp:  [97.9 °F (36.6 °C)-98.8 °F (37.1 °C)]   Pulse:  [74-98]   Resp:  [16-20]   BP: (139-179)/(63-74)   SpO2:  [91 %-100 %] .   Home meds for hypertension were reviewed and noted below.   Hypertension Medications               isosorbide mononitrate (IMDUR) 60 MG 24 hr tablet Take 1 tablet (60 mg total) by mouth once daily.    metoprolol succinate (TOPROL-XL) 25 MG 24 hr tablet Take 1 tablet (25 mg total) by mouth once daily.            While in the hospital, will manage blood pressure as follows; Continue home antihypertensive regimen to treat in hospital.  -BP higher 9/26, had hydralazine added to isorbide/metoprolol last admit so start hydralazine now as BP 180s and she reporst pain minimal  -improving 9/27    Will utilize p.r.n. blood pressure medication only if patient's blood pressure greater than 180/110 and she develops symptoms such as worsening chest pain or shortness of breath.

## 2024-09-27 NOTE — PT/OT/SLP PROGRESS
Occupational Therapy      Patient Name:  Lorena Contreras   MRN:  [id number]    Patient not seen today secondary to patient unwilling to participate due to being on bed pan with frequent diarrhea during am attempt.  Will follow-up as scheduled per OT POC.    9/27/2024

## 2024-09-27 NOTE — PROGRESS NOTES
Pharmacokinetic Assessment Follow Up: IV Vancomycin    Vancomycin serum concentration assessment(s):    The random level was drawn correctly and can be used to guide therapy at this time. The measurement is above the desired definitive target range of 15 to 20 mcg/mL.    Vancomycin Regimen Plan:    Re-dose when the random level is less than 20 mcg/mL, next level to be drawn at 1100 on 9/28    Drug levels (last 3 results):  Recent Labs   Lab Result Units 09/26/24 2029 09/27/24  1116   Vancomycin, Random ug/mL 14.7 22.4       Pharmacy will continue to follow and monitor vancomycin.    Please contact pharmacy at extension [phone] for questions regarding this assessment.    Thank you for the consult,   Viviana Brito, PharmD       Patient brief summary:  Lorena Contreras is a 73 y.o. female initiated on antimicrobial therapy with IV Vancomycin for treatment of bone/joint infection        Drug Allergies:   Review of patient's allergies indicates:   Allergen Reactions    Novolin 70/30 (semi-synthetic) Nausea And Vomiting     Severe vomiting on Relion 70/30    Sulfa (sulfonamide antibiotics) Anaphylaxis    Talwin [pentazocine lactate] Anaphylaxis    Victoza [liraglutide] Nausea And Vomiting    Glipizide Nausea Only    Citric acid     Codeine     Influenza virus vaccines Hives    Iodine and iodide containing products Hives    Levetiracetam Itching    Neurontin [gabapentin]      Possible associated myoclonic jerk    Rituxan [rituximab] Hives    Zoloft [sertraline] Nausea And Vomiting       Actual Body Weight:   85.4 kg    Renal Function:   Estimated Creatinine Clearance: 31.3 mL/min (A) (based on SCr of 1.9 mg/dL (H)).,     Dialysis Method (if applicable):  N/A    CBC (last 72 hours):  Recent Labs   Lab Result Units 09/24/24  1622 09/25/24  0248 09/26/24  0447 09/27/24  0400   WBC K/uL 9.42 9.03 7.79 8.17   Hemoglobin g/dL 7.9* 7.4* 7.3* 7.3*   Hematocrit % 25.9* 24.1* 23.5* 24.2*   Platelets K/uL 213 210 209 224   Gran % %  85.0*  --   --   --    Lymph % % 8.6*  --   --   --    Mono % % 5.0  --   --   --    Eosinophil % % 0.4  --   --   --    Basophil % % 0.6  --   --   --    Differential Method  Automated  --   --   --        Metabolic Panel (last 72 hours):  Recent Labs   Lab Result Units 09/24/24  1622 09/25/24  0028 09/25/24  0248 09/26/24  0447 09/27/24  0400   Sodium mmol/L 132*  --  134* 138 138   Potassium mmol/L 4.5  --  4.2 4.2 4.2   Chloride mmol/L 102  --  104 105 101   CO2 mmol/L 22*  --  21* 26 29   Glucose mg/dL 232*  --  126* 93 88   Glucose, UA   --  Negative  --   --   --    BUN mg/dL 36*  --  35* 32* 30*   Creatinine mg/dL 2.0*  --  2.0* 1.9* 1.9*   Albumin g/dL 2.2*  --  2.2* 2.2* 2.1*   Total Bilirubin mg/dL 0.4  --  0.4 0.4 0.3   Alkaline Phosphatase U/L 476*  --  418* 377* 342*   AST U/L 122*  --  92* 60* 61*   ALT U/L 145*  --  122* 85* 69*   Magnesium mg/dL  --   --  1.7 1.7 1.9   Phosphorus mg/dL  --   --   --   --  3.4       Vancomycin Administrations:  vancomycin given in the last 96 hours                     vancomycin (VANCOCIN) 1,000 mg in D5W 250 mL IVPB (admixture device) (mg) 1,000 mg New Bag 09/26/24 2346    vancomycin 750 mg in D5W 250 mL IVPB (admixture device) (mg) 750 mg New Bag 09/26/24 0838    vancomycin injection (g) 1 g Given 09/25/24 1347    vancomycin (VANCOCIN) 1,000 mg in D5W 250 mL IVPB (mg) 1,000 mg New Bag 09/25/24 1310                    Microbiologic Results:  Microbiology Results (last 7 days)       Procedure Component Value Units Date/Time    Culture, Anaerobe [[id number]] Collected: 09/25/24 1329    Order Status: Completed Specimen: Ankle, Right Updated: 09/27/24 0927     Anaerobic Culture Culture in progress    Narrative:      1. Right ankle wound -culture    Culture, Anaerobe [[id number]] Collected: 09/25/24 1329    Order Status: Completed Specimen: Ankle, Right Updated: 09/27/24 0926     Anaerobic Culture Culture in progress    Narrative:      3.. Right ankle wound -culture     Culture, Anaerobe [[id number]] Collected: 09/25/24 1329    Order Status: Completed Specimen: Ankle, Right Updated: 09/27/24 0924     Anaerobic Culture Culture in progress    Narrative:      2. . Right ankle wound -culture    Urine culture [[id number]]  (Abnormal)  (Susceptibility) Collected: 09/25/24 0028    Order Status: Completed Specimen: Urine Updated: 09/27/24 0459     Urine Culture, Routine ESCHERICHIA COLI  > 100,000 cfu/ml  No other significant isolate      Narrative:      Specimen Source->Urine    Aerobic culture [[id number]] Collected: 09/25/24 1329    Order Status: Completed Specimen: Ankle, Right Updated: 09/26/24 0734     Aerobic Bacterial Culture No growth    Narrative:      1. Right ankle wound -culture    Aerobic culture [[id number]] Collected: 09/25/24 1329    Order Status: Completed Specimen: Ankle, Right Updated: 09/26/24 0734     Aerobic Bacterial Culture No growth    Narrative:      2. Right ankle wound -culture    Aerobic culture [[id number]] Collected: 09/25/24 1329    Order Status: Completed Specimen: Ankle, Right Updated: 09/26/24 0734     Aerobic Bacterial Culture No growth    Narrative:      3.. Right ankle wound -culture    Fungus culture [[id number]] Collected: 09/25/24 1329    Order Status: Sent Specimen: Ankle, Right Updated: 09/25/24 1418

## 2024-09-28 PROBLEM — E87.6 HYPOKALEMIA: Status: ACTIVE | Noted: 2024-09-28

## 2024-09-28 LAB
ALBUMIN SERPL BCP-MCNC: 2.1 G/DL (ref 3.5–5.2)
ALP SERPL-CCNC: 323 U/L (ref 55–135)
ALT SERPL W/O P-5'-P-CCNC: 53 U/L (ref 10–44)
ANION GAP SERPL CALC-SCNC: 8 MMOL/L (ref 8–16)
AST SERPL-CCNC: 50 U/L (ref 10–40)
BACTERIA SPEC AEROBE CULT: NO GROWTH
BILIRUB SERPL-MCNC: 0.4 MG/DL (ref 0.1–1)
BUN SERPL-MCNC: 26 MG/DL (ref 8–23)
C DIFF GDH STL QL: POSITIVE
C DIFF TOX A+B STL QL IA: POSITIVE
CALCIUM SERPL-MCNC: 8.6 MG/DL (ref 8.7–10.5)
CHLORIDE SERPL-SCNC: 98 MMOL/L (ref 95–110)
CK SERPL-CCNC: 15 U/L (ref 20–180)
CO2 SERPL-SCNC: 29 MMOL/L (ref 23–29)
CREAT SERPL-MCNC: 1.6 MG/DL (ref 0.5–1.4)
ERYTHROCYTE [DISTWIDTH] IN BLOOD BY AUTOMATED COUNT: 16.7 % (ref 11.5–14.5)
EST. GFR  (NO RACE VARIABLE): 33.8 ML/MIN/1.73 M^2
GLUCOSE SERPL-MCNC: 110 MG/DL (ref 70–110)
HCT VFR BLD AUTO: 29.9 % (ref 37–48.5)
HGB BLD-MCNC: 9.5 G/DL (ref 12–16)
MAGNESIUM SERPL-MCNC: 2 MG/DL (ref 1.6–2.6)
MCH RBC QN AUTO: 29.1 PG (ref 27–31)
MCHC RBC AUTO-ENTMCNC: 31.8 G/DL (ref 32–36)
MCV RBC AUTO: 92 FL (ref 82–98)
PHOSPHATE SERPL-MCNC: 3.6 MG/DL (ref 2.7–4.5)
PLATELET # BLD AUTO: 265 K/UL (ref 150–450)
PMV BLD AUTO: 12.5 FL (ref 9.2–12.9)
POCT GLUCOSE: 117 MG/DL (ref 70–110)
POCT GLUCOSE: 163 MG/DL (ref 70–110)
POCT GLUCOSE: 174 MG/DL (ref 70–110)
POCT GLUCOSE: 97 MG/DL (ref 70–110)
POTASSIUM SERPL-SCNC: 3.4 MMOL/L (ref 3.5–5.1)
PROT SERPL-MCNC: 5.9 G/DL (ref 6–8.4)
RBC # BLD AUTO: 3.26 M/UL (ref 4–5.4)
SODIUM SERPL-SCNC: 135 MMOL/L (ref 136–145)
WBC # BLD AUTO: 8.8 K/UL (ref 3.9–12.7)

## 2024-09-28 PROCEDURE — 84100 ASSAY OF PHOSPHORUS: CPT | Mod: HCNC | Performed by: HOSPITALIST

## 2024-09-28 PROCEDURE — 21400001 HC TELEMETRY ROOM: Mod: HCNC

## 2024-09-28 PROCEDURE — 25000003 PHARM REV CODE 250: Mod: HCNC | Performed by: HOSPITALIST

## 2024-09-28 PROCEDURE — 87324 CLOSTRIDIUM AG IA: CPT | Mod: HCNC | Performed by: HOSPITALIST

## 2024-09-28 PROCEDURE — 99233 SBSQ HOSP IP/OBS HIGH 50: CPT | Mod: HCNC,GC,, | Performed by: INTERNAL MEDICINE

## 2024-09-28 PROCEDURE — 25000003 PHARM REV CODE 250: Mod: HCNC

## 2024-09-28 PROCEDURE — 82550 ASSAY OF CK (CPK): CPT | Mod: HCNC | Performed by: INTERNAL MEDICINE

## 2024-09-28 PROCEDURE — 63600175 PHARM REV CODE 636 W HCPCS: Mod: HCNC | Performed by: INTERNAL MEDICINE

## 2024-09-28 PROCEDURE — 27000207 HC ISOLATION: Mod: HCNC

## 2024-09-28 PROCEDURE — 36415 COLL VENOUS BLD VENIPUNCTURE: CPT | Mod: HCNC | Performed by: HOSPITALIST

## 2024-09-28 PROCEDURE — 97530 THERAPEUTIC ACTIVITIES: CPT | Mod: HCNC

## 2024-09-28 PROCEDURE — 87449 NOS EACH ORGANISM AG IA: CPT | Mod: HCNC | Performed by: HOSPITALIST

## 2024-09-28 PROCEDURE — 25000003 PHARM REV CODE 250: Mod: HCNC | Performed by: INTERNAL MEDICINE

## 2024-09-28 PROCEDURE — 80053 COMPREHEN METABOLIC PANEL: CPT | Mod: HCNC | Performed by: HOSPITALIST

## 2024-09-28 PROCEDURE — 83735 ASSAY OF MAGNESIUM: CPT | Mod: HCNC | Performed by: HOSPITALIST

## 2024-09-28 PROCEDURE — 97542 WHEELCHAIR MNGMENT TRAINING: CPT | Mod: HCNC

## 2024-09-28 PROCEDURE — 97535 SELF CARE MNGMENT TRAINING: CPT | Mod: HCNC

## 2024-09-28 PROCEDURE — 85027 COMPLETE CBC AUTOMATED: CPT | Mod: HCNC | Performed by: HOSPITALIST

## 2024-09-28 RX ORDER — HYDRALAZINE HYDROCHLORIDE 50 MG/1
100 TABLET, FILM COATED ORAL EVERY 8 HOURS
Status: DISCONTINUED | OUTPATIENT
Start: 2024-09-28 | End: 2024-10-03 | Stop reason: HOSPADM

## 2024-09-28 RX ORDER — DIPHENOXYLATE HYDROCHLORIDE AND ATROPINE SULFATE 2.5; .025 MG/1; MG/1
1 TABLET ORAL 4 TIMES DAILY
Status: DISCONTINUED | OUTPATIENT
Start: 2024-09-28 | End: 2024-09-29

## 2024-09-28 RX ORDER — HYDROCORTISONE 25 MG/G
CREAM TOPICAL 2 TIMES DAILY
Status: DISCONTINUED | OUTPATIENT
Start: 2024-09-28 | End: 2024-10-03 | Stop reason: HOSPADM

## 2024-09-28 RX ORDER — POTASSIUM CHLORIDE 20 MEQ/1
20 TABLET, EXTENDED RELEASE ORAL ONCE
Status: COMPLETED | OUTPATIENT
Start: 2024-09-28 | End: 2024-09-28

## 2024-09-28 RX ADMIN — ALUMINUM HYDROXIDE, MAGNESIUM HYDROXIDE, AND SIMETHICONE 30 ML: 1200; 120; 1200 SUSPENSION ORAL at 10:09

## 2024-09-28 RX ADMIN — ALUMINUM HYDROXIDE, MAGNESIUM HYDROXIDE, AND SIMETHICONE 30 ML: 1200; 120; 1200 SUSPENSION ORAL at 09:09

## 2024-09-28 RX ADMIN — TICAGRELOR 90 MG: 90 TABLET ORAL at 09:09

## 2024-09-28 RX ADMIN — METOPROLOL SUCCINATE 25 MG: 25 TABLET, EXTENDED RELEASE ORAL at 09:09

## 2024-09-28 RX ADMIN — SUCRALFATE 1 G: 1 SUSPENSION ORAL at 12:09

## 2024-09-28 RX ADMIN — LOPERAMIDE HYDROCHLORIDE 2 MG: 2 CAPSULE ORAL at 12:09

## 2024-09-28 RX ADMIN — HYDRALAZINE HYDROCHLORIDE 100 MG: 50 TABLET ORAL at 02:09

## 2024-09-28 RX ADMIN — METHOCARBAMOL 500 MG: 500 TABLET ORAL at 09:09

## 2024-09-28 RX ADMIN — ALUMINUM HYDROXIDE, MAGNESIUM HYDROXIDE, AND SIMETHICONE 30 ML: 1200; 120; 1200 SUSPENSION ORAL at 05:09

## 2024-09-28 RX ADMIN — METHOCARBAMOL 500 MG: 500 TABLET ORAL at 12:09

## 2024-09-28 RX ADMIN — POTASSIUM CHLORIDE 20 MEQ: 1500 TABLET, EXTENDED RELEASE ORAL at 09:09

## 2024-09-28 RX ADMIN — HYDRALAZINE HYDROCHLORIDE 75 MG: 50 TABLET ORAL at 05:09

## 2024-09-28 RX ADMIN — DIPHENOXYLATE HYDROCHLORIDE AND ATROPINE SULFATE 1 TABLET: .025; 2.5 TABLET ORAL at 02:09

## 2024-09-28 RX ADMIN — HYDROCORTISONE: 25 CREAM TOPICAL at 09:09

## 2024-09-28 RX ADMIN — DAPTOMYCIN 685 MG: 350 INJECTION, POWDER, LYOPHILIZED, FOR SOLUTION INTRAVENOUS at 10:09

## 2024-09-28 RX ADMIN — DIPHENOXYLATE HYDROCHLORIDE AND ATROPINE SULFATE 1 TABLET: .025; 2.5 TABLET ORAL at 10:09

## 2024-09-28 RX ADMIN — DIPHENOXYLATE HYDROCHLORIDE AND ATROPINE SULFATE 1 TABLET: .025; 2.5 TABLET ORAL at 09:09

## 2024-09-28 RX ADMIN — APIXABAN 2.5 MG: 2.5 TABLET, FILM COATED ORAL at 09:09

## 2024-09-28 RX ADMIN — DIPHENOXYLATE HYDROCHLORIDE AND ATROPINE SULFATE 1 TABLET: .025; 2.5 TABLET ORAL at 04:09

## 2024-09-28 RX ADMIN — ALUMINUM HYDROXIDE, MAGNESIUM HYDROXIDE, AND SIMETHICONE 30 ML: 200; 200; 20 SUSPENSION ORAL at 09:09

## 2024-09-28 RX ADMIN — METHOCARBAMOL 500 MG: 500 TABLET ORAL at 04:09

## 2024-09-28 RX ADMIN — SUCRALFATE 1 G: 1 SUSPENSION ORAL at 05:09

## 2024-09-28 RX ADMIN — FUROSEMIDE 40 MG: 10 INJECTION, SOLUTION INTRAVENOUS at 09:09

## 2024-09-28 RX ADMIN — OXYCODONE HYDROCHLORIDE 5 MG: 5 TABLET ORAL at 09:09

## 2024-09-28 RX ADMIN — LOPERAMIDE HYDROCHLORIDE 2 MG: 2 CAPSULE ORAL at 05:09

## 2024-09-28 RX ADMIN — ISOSORBIDE MONONITRATE 60 MG: 30 TABLET, EXTENDED RELEASE ORAL at 09:09

## 2024-09-28 RX ADMIN — CEFTRIAXONE 1 G: 1 INJECTION, POWDER, FOR SOLUTION INTRAMUSCULAR; INTRAVENOUS at 11:09

## 2024-09-28 RX ADMIN — ALUMINUM HYDROXIDE, MAGNESIUM HYDROXIDE, AND SIMETHICONE 30 ML: 1200; 120; 1200 SUSPENSION ORAL at 04:09

## 2024-09-28 RX ADMIN — HYDRALAZINE HYDROCHLORIDE 100 MG: 50 TABLET ORAL at 09:09

## 2024-09-28 RX ADMIN — INSULIN GLARGINE 16 UNITS: 100 INJECTION, SOLUTION SUBCUTANEOUS at 09:09

## 2024-09-28 RX ADMIN — FLUOXETINE HYDROCHLORIDE 40 MG: 20 CAPSULE ORAL at 09:09

## 2024-09-28 RX ADMIN — QUETIAPINE FUMARATE 50 MG: 25 TABLET ORAL at 09:09

## 2024-09-28 NOTE — ASSESSMENT & PLAN NOTE
Closed right pilon fracture s/p ORIF on 8/14/2024  72 yo female with history of a fall resulting in a pilon fracture to the right ankle on 8/14/24 s/p ORIF. Sutures removed on 09/12/2024 in Ortho clinic. Presented to hospital for swelling and wound dehiscence to right ankle. No leukocytosis. CRP 9.3 and ESR 49. X-ray of right ankle on admit showed Orthopedic hardware intact with no acute fracture or soft tissue swelling.    - Orthopedics consulted in ED:       - Wound irrigated and soft dressings applied in ED.  - Patient non weight bearing to right lower extremity as per Ortho.   - Multimodal pain regimen. Avoid Tylenol as AST and ALT elevated. Oxycodone IR po prn for breakthrough pain.   - Plan for irrigation and debridement of right ankle wound in OR today (9/25/24) with Dr. Davalos and Orthopedics with vac placed and full op note still pending from 9/25.  -vanc/rocephin for coverage with serosanginous drainage reported and ortho note reports ID consulted but they were not so consulted 9/27 given ortho feels concern for infectious given this. ID changed to dapto/rocephin on 9/28 and appreciate assistance.  -NWB until at least 11/1 per ortho-   -prevena wound vac for a week  -was on eliquis until oct 26th post pelvic/ankel fx for ppx, okay to resume per ortho and resumed 9/28  Rec for SNF but she declines as out of days and misses her cats and had bad experience. Family aware

## 2024-09-28 NOTE — ASSESSMENT & PLAN NOTE
73 year old female with a history of right pilon fracture s/p ORIF on 8/14/24.  She is now admitted with wound dehiscence.  She underwent I&D on 9/25/24.  Surgical cultures are still negative to date.    Plan  Continue Daptomycin IV  Stop ceftriaxone IV  Will follow up on surgical cultures. So far ngtd  Will discuss with orthopedics if I&D had findings concerning for infection to help assess needs for antibiotics

## 2024-09-28 NOTE — PT/OT/SLP PROGRESS
"Occupational Therapy   Co-Treatment    Name: Lorena Contreras  MRN: [id number]  Admitting Diagnosis:  Wound dehiscence  3 Days Post-Op    Recommendations:     Discharge Recommendations: Moderate Intensity Therapy  Discharge Equipment Recommendations:  grab bar, hip kit  Barriers to discharge:  Other (Comment) (increased skilled assist needed)    Assessment:     Lorena Contreras is a 73 y.o. female with a medical diagnosis of Wound dehiscence.  She presents with the following performance deficits affecting function: weakness, impaired endurance, impaired self care skills, impaired functional mobility, gait instability, impaired balance, decreased coordination, decreased lower extremity function, decreased safety awareness, pain, decreased ROM, edema, orthopedic precautions.     Pt agreeable to session and motivated to participate. Pt presenting with increased anxiety and fear of falling with OOB activities and functional transfers. Pt would continue to benefit from skilled OT services in the acute care setting to improve activity tolerance and functional endurance, increase participation in self-care routines, and promote functional independence needed to return to PLOF and least restrictive home environment.     Rehab Prognosis:  Good; patient would benefit from acute skilled OT services to address these deficits and reach maximum level of function.       Plan:     Patient to be seen 4 x/week to address the above listed problems via self-care/home management, therapeutic activities, therapeutic exercises, neuromuscular re-education  Plan of Care Expires: 10/26/24  Plan of Care Reviewed with: patient    Subjective     Chief Complaint: pain, fear of falling  Patient/Family Comments/goals: "I might have to go while I'm up - they have me on lasix."  Pain/Comfort:  Pain Rating 1: 5/10  Location - Side 1: Right  Location - Orientation 1: generalized  Location 1: leg  Pain Addressed 1: Reposition, Distraction, " Cessation of Activity, Pre-medicate for activity  Pain Rating Post-Intervention 1: 4/10    Objective:     Communicated with: Nursing prior to session.  Patient found HOB elevated with wound vac, PureWick, peripheral IV, oxygen upon OT entry to room.    General Precautions: Standard, fall, diabetic    Orthopedic Precautions:RLE non weight bearing  Braces: N/A  Respiratory Status: Nasal cannula, flow 3 L/min     Occupational Performance:     Bed Mobility:    Patient completed Supine to Sit with stand by assistance  Patient completed Sit to Supine with stand by assistance     Functional Mobility/Transfers:  Patient completed Bed <> Wheelchair Transfer using Slide Board technique with minimum assistance   Patient completed Wheelchair <> Bed Transfer using Slide Board technique with maximal assistance secondary to more difficulty with high <> low surface  Functional Mobility: Pt able to tolerate self-propelling WC throughout room and hallway to simulate household/community distances ~60 ft x2 trials with one rest break between bouts with supervision provided for line management. Pt demonstrating some fatigue and SOB between trials.     Activities of Daily Living:  Upper Body Dressing: minimum assistance for donning/doffing hospital gown around backside  Lower Body Dressing: maximal assistance for donning hospital sock prior to ambulation  Toileting: dependence Pure [place]      [facility] 6 Click ADL: 17    Treatment & Education:  Provided education on the role of OT, POC, and therapy goals while in the acute care setting. Provided education on the importance of continued mobilization and participation in OOB activities to increase functional endurance and activity tolerance for increased participation in ADL routines. Provided education on safe transfer techniques and proper hand placement to promote safety awareness and prevent falls with functional transfers. All questions within the scope of OT answered, and pt verbalized  understanding. White board updated.     Co-treatment performed due to patient's multiple deficits requiring two skilled therapists to appropriately and safely assess patient's strength, endurance, functional mobility, and ADL performance while facilitating functional tasks in addition to accommodating for patient's activity tolerance and medical acuity.    Patient left HOB elevated with all lines intact, call button in reach, and nursing notified    GOALS:   Multidisciplinary Problems       Occupational Therapy Goals          Problem: Occupational Therapy    Goal Priority Disciplines Outcome Interventions   Occupational Therapy Goal     OT, PT/OT Progressing    Description: Goals to be met by: 10/26/24     Patient will increase functional independence with ADLs by performing:    LE Dressing with Supervision.  Grooming while standing at sink with Supervision.  T/f with slideboard with CGA  Toileting from toilet with Supervision for hygiene and clothing management.   Supine to sit with Supervision.                         Time Tracking:     OT Date of Treatment: 09/28/24  OT Start Time: 1121  OT Stop Time: 1155  OT Total Time (min): 34 min    Billable Minutes:Self Care/Home Management 12 minutes  Therapeutic Activity 22 minutes    OT/ERIKA: OT          9/28/2024

## 2024-09-28 NOTE — CONSULTS
Vancomycin order and consult has been discontinued. Pharmacy will sign off. Please re-consult if needed.     Thanks  Lala West, PharmD, [facility]  [facility] Pharmacist   Ext [phone]

## 2024-09-28 NOTE — PLAN OF CARE
Problem: Physical Therapy  Goal: Physical Therapy Goal  Description: Goals to be met by: 10/26/2024     Patient will increase functional independence with mobility by performin. Supine to sit with Modified Cord  2. Sit to supine with Modified Cord  3. Sit to stand transfer with Minimal Assistance  4. Bed to chair transfer with Minimal Assistance using LRAD  5. Gait  x 5 feet with Moderate Assistance using LRAD.   6. Wheelchair propulsion x100 feet with Supervision using bilateral upper extremities  7. Pt to transfer bed to/from w/c with sliding board with minimal assist with NWB R LE    Outcome: Progressing   Pt's goals revised to include slide board transfer and pt will continue to benefit from skilled PT services to work towards improved functional mobility including: bed mobility, transfers, w/c mobility, and gait. Danuta Waldrop PT  2024

## 2024-09-28 NOTE — SUBJECTIVE & OBJECTIVE
Past Medical History:   Diagnosis Date    Allergy     Altered mental status 06/19/2022    DYSARTHRIA, SPASTIC MOVEMENTS & DIFFICULTY SWALLOWING    Anemia     Anxiety     Arthritis     Cataract     both removed    Colon polyps     Coronary artery disease     Depression     Diabetes mellitus, type II     Disorder of kidney and ureter     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/2023    Fibromyalgia     Follicular lymphoma     GERD (gastroesophageal reflux disease)     HTN (hypertension)     Hyperlipidemia     MI (myocardial infarction) 03/2019    Personal history of colonic polyps     Restless leg syndrome     Stroke        Past Surgical History:   Procedure Laterality Date    APPLICATION OF WOUND VACUUM-ASSISTED CLOSURE DEVICE Right 9/25/2024    Procedure: APPLICATION, WOUND VAC;  Surgeon: Neto Davalos MD;  Location: [facility] OR [address] [place] [address];  Service: Orthopedics;  Laterality: Right;    COLONOSCOPY  11/07/2012    Colon polyp found; repeat in 5 years    DEBRIDEMENT OF LOCAL FLAP Right 9/25/2024    Procedure: DEBRIDEMENT, LOCAL FLAP;  Surgeon: Neto Davalos MD;  Location: [facility] OR [address] [place] [address];  Service: Orthopedics;  Laterality: Right;    ELBOW SURGERY Right 2015    dislocation repair     ESOPHAGOGASTRODUODENOSCOPY  11/07/2012    atrophic gastritis, H pylori testing negative    INCISION AND DRAINAGE FOOT Right 6/2/2021    Procedure: INCISION AND DRAINAGE, FOOT, bone biopsy;  Surgeon: Quiana Penn DPM;  Location: [facility] OR;  Service: Podiatry;  Laterality: Right;    IRRIGATION AND DEBRIDEMENT OF LOWER EXTREMITY Right 9/25/2024    Procedure: IRRIGATION AND DEBRIDEMENT, LOWER EXTREMITY; slider table, supine, bone foam, cysto tubing, 6L NS/dakins/peroxide, culture swabs;  Surgeon: Neto Davalos MD;  Location: [facility] OR [address] [place] [address];  Service: Orthopedics;  Laterality: Right;    KNEE SURGERY Bilateral 2015    scoped    LEFT HEART CATHETERIZATION Left 3/29/2019    Procedure: Left heart cath;  Surgeon: Bladimir WHITING  MD Chelsey;  Location: [facility] CATH LAB;  Service: Cardiology;  Laterality: Left;    LEFT HEART CATHETERIZATION Left 11/18/2019    Procedure: Left heart cath;  Surgeon: Karl Rico MD;  Location: [facility] CATH LAB;  Service: Cardiology;  Laterality: Left;    LEFT HEART CATHETERIZATION Left 1/8/2020    Procedure: Left heart cath, right radial, noon start;  Surgeon: Christos Monreal MD;  Location: [facility] CATH LAB;  Service: Cardiology;  Laterality: Left;  RN Pre Op 1-6-20.  To be admitted 1-7-20 sor Aspirin Disensitation    OPEN REDUCTION AND INTERNAL FIXATION (ORIF) OF FRACTURE OF ACETABULUM Right 8/16/2024    Procedure: ORIF, FRACTURE, ACETABULUM;  Surgeon: Neto Davalos MD;  Location: [address];  Service: Orthopedics;  Laterality: Right;  anterior and lateral pelvic incisions    OPEN REDUCTION AND INTERNAL FIXATION (ORIF) OF PILON FRACTURE Right 8/14/2024    Procedure: ORIF, FRACTURE, PILON;  Surgeon: Neto Davalos MD;  Location: [facility] OR [address] [place] [address];  Service: Orthopedics;  Laterality: Right;    TONSILLECTOMY  1955    ULTRASOUND GUIDANCE  1/8/2020    Procedure: Ultrasound Guidance;  Surgeon: Christos Monreal MD;  Location: [facility] CATH LAB;  Service: Cardiology;;       Review of patient's allergies indicates:   Allergen Reactions    Novolin 70/30 (semi-synthetic) Nausea And Vomiting     Severe vomiting on Relion 70/30    Sulfa (sulfonamide antibiotics) Anaphylaxis    Talwin [pentazocine lactate] Anaphylaxis    Victoza [liraglutide] Nausea And Vomiting    Glipizide Nausea Only    Citric acid     Codeine     Influenza virus vaccines Hives    Iodine and iodide containing products Hives    Levetiracetam Itching    Neurontin [gabapentin]      Possible associated myoclonic jerk    Rituxan [rituximab] Hives    Zoloft [sertraline] Nausea And Vomiting       Medications:  Medications Prior to Admission   Medication Sig    metoprolol succinate (TOPROL-XL) 25 MG 24 hr tablet Take 1 tablet (25 mg total) by  mouth once daily.    acetaminophen (TYLENOL) 500 MG tablet Take 1 tablet (500 mg total) by mouth every 8 (eight) hours.    albuterol (PROVENTIL/VENTOLIN HFA) 90 mcg/actuation inhaler Inhale 2 puffs into the lungs every 6 (six) hours as needed for Wheezing or Shortness of Breath.    aluminum & magnesium hydroxide-simethicone (MYLANTA MAX STRENGTH) 400-400-40 mg/5 mL suspension Take 30 mLs by mouth every 6 (six) hours as needed for Indigestion.    apixaban (ELIQUIS) 2.5 mg Tab Take 1 tablet (2.5 mg total) by mouth 2 (two) times daily. End date October 26th 2024    aspirin 81 MG Chew Take 1 tablet (81 mg total) by mouth once daily. Hold to avoid bleed risk on triple therapy and then resume on oct 27 once eliquis stops on oct 26th    atorvastatin (LIPITOR) 80 MG tablet Take 1 tablet by mouth in the evening    calcium carbonate (CALCIUM ANTACID) 300 mg (750 mg) Chew Take 1 750 mg tablet daily    DEXCOM G7  Misc Use 1  to track blood glucose, ICD10: E11.65    DEXCOM G7 SENSOR Samina Use 1 sensor every 10 days to track blood glucose, ICD10: E11.65, okay with 90 day supply if possible    divalproex (DEPAKOTE SPRINKLES) 125 mg capsule Take 2 capsules (250 mg total) by mouth every 8 (eight) hours. Per psych MD can stop while at SNF if doing well without any altered mental status while there but continue on discharge from hospital for now    FLUoxetine 40 MG capsule Take 1 capsule (40 mg total) by mouth once daily.    hydrOXYzine HCL (ATARAX) 25 MG tablet Take 1 tablet (25 mg total) by mouth 3 (three) times daily as needed for Itching.    insulin aspart U-100 (NOVOLOG) 100 unit/mL (3 mL) InPn pen Inject 0-10 Units into the skin before meals and at bedtime as needed (Hyperglycemia).    insulin glargine U-100, Lantus, 100 unit/mL (3 mL) SubQ InPn pen Inject 19 Units into the skin every evening.    isosorbide mononitrate (IMDUR) 60 MG 24 hr tablet Take 1 tablet (60 mg total) by mouth once daily.    melatonin  "(MELATIN) 3 mg tablet Take 2 tablets (6 mg total) by mouth nightly.    methocarbamoL (ROBAXIN) 500 MG Tab Take 1 tablet (500 mg total) by mouth 4 (four) times daily.    ondansetron (ZOFRAN-ODT) 8 MG TbDL Take 1 tablet (8 mg total) by mouth every 8 (eight) hours as needed (nausea).    oxyCODONE (ROXICODONE) 10 mg Tab immediate release tablet Take 1 tablet (10 mg total) by mouth every 6 (six) hours as needed (pain scale 7-10).    oxyCODONE (ROXICODONE) 5 MG immediate release tablet Take 1 tablet (5 mg total) by mouth every 6 (six) hours as needed (pain scale 4-6).    OZEMPIC 1 mg/dose (4 mg/3 mL) Inject 1 mg into the skin every 7 days. HOLD while at [facility]    pantoprazole (PROTONIX) 40 MG tablet Take 1 tablet (40 mg total) by mouth once daily.    pen needle, diabetic (BD ULTRA-FINE SAGAR PEN NEEDLE) 32 gauge x 5/32" Ndle One pen needle use with insulin pen 4 times a day.  ICD-10: E11.9    polyethylene glycol (GLYCOLAX) 17 gram PwPk Take 17 g by mouth daily as needed for Constipation.    QUEtiapine (SEROQUEL) 50 MG tablet Take 1 tablet (50 mg total) by mouth every evening.    semaglutide (OZEMPIC) 0.25 mg or 0.5 mg (2 mg/3 mL) pen injector Inject 0.5 mg once weekly thereafter    HOLD at [facility]    ticagrelor (BRILINTA) 90 mg tablet Take 1 tablet (90 mg total) by mouth 2 (two) times daily.    [DISCONTINUED] amoxicillin-clavulanate 875-125mg (AUGMENTIN) 875-125 mg per tablet Take 1 tablet by mouth 2 (two) times daily.     Antibiotics (From admission, onward)      Start     Stop Route Frequency Ordered    09/26/24 0833  vancomycin - pharmacy to dose  (vancomycin IVPB (PEDS and ADULTS))        Placed in "And" Linked Group    -- IV pharmacy to manage frequency 09/26/24 0734    09/25/24 1000  cefTRIAXone (Rocephin) 1 g in D5W 100 mL IVPB (MB+)         -- IV Every 24 hours (non-standard times) 09/25/24 0892          Antifungals (From admission, onward)      None          Antivirals (From admission, onward)      None         "     Immunization History   Administered Date(s) Administered    PPD Test 08/15/2024    Pneumococcal Conjugate - 13 Valent 11/28/2016    Pneumococcal Polysaccharide - 23 Valent 02/23/2012, 02/01/2018    Tdap 02/23/2012, 11/27/2012, 05/28/2021       Family History       Problem Relation (Age of Onset)    Allergy (severe) Daughter    Cancer Mother, Father    Diabetes Sister    Heart disease Mother    Hypertension Sister    Lung cancer Brother    No Known Problems Daughter          Social History     Socioeconomic History    Marital status:     Number of children: 2   Occupational History    Occupation: house wife    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  2 dtr.  Lives with .  3 cats and a dog.  Retired.  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  Lives in house, 1 story and 4 steps up and has a ramp.      Enjoys crafting.  Unable to bowl due to myalgias.       Social Determinants of Health     Financial Resource Strain: Low Risk  (8/14/2024)    Overall Financial Resource Strain (CARDIA)     Difficulty of Paying Living Expenses: Not hard at all   Food Insecurity: No Food Insecurity (8/14/2024)    Hunger Vital Sign     Worried About Running Out of Food in the Last Year: Never true     Ran Out of Food in the Last Year: Never true   Transportation Needs: No Transportation Needs (8/14/2024)    TRANSPORTATION NEEDS     Transportation : No   Physical Activity: Inactive (8/14/2024)    Exercise Vital Sign     Days of Exercise per Week: 0 days     Minutes of Exercise per Session: 0 min   Stress: No Stress Concern Present (8/14/2024)    [employer] [employer] - Occupational Stress Questionnaire     Feeling of Stress : Not at all   Housing Stability: Low  Risk  (8/14/2024)    Housing Stability Vital Sign     Unable to Pay for Housing in the Last Year: No     Homeless in the Last Year: No     Review of Systems   Musculoskeletal:  Positive for arthralgias.   Skin:  Positive for wound.   All other systems reviewed and are negative.    Objective:     Vital Signs (Most Recent):  Temp: 98.3 °F (36.8 °C) (09/27/24 2056)  Pulse: 87 (09/27/24 2056)  Resp: 17 (09/27/24 2056)  BP: (!) 153/70 (09/27/24 2116)  SpO2: 98 % (09/27/24 2056) Vital Signs (24h Range):  Temp:  [97.5 °F (36.4 °C)-98.8 °F (37.1 °C)] 98.3 °F (36.8 °C)  Pulse:  [74-93] 87  Resp:  [16-20] 17  SpO2:  [91 %-98 %] 98 %  BP: (139-176)/(63-73) 153/70     Weight: 85.4 kg (188 lb 4.4 oz)  Body mass index is 27.01 kg/m².    Estimated Creatinine Clearance: 31.3 mL/min (A) (based on SCr of 1.9 mg/dL (H)).     Physical Exam  Vitals and nursing note reviewed.   Constitutional:       General: She is not in acute distress.     Appearance: She is well-developed. She is not diaphoretic.   HENT:      Head: Normocephalic and atraumatic.      Right Ear: External ear normal.      Left Ear: External ear normal.      Nose: Nose normal.      Mouth/Throat:      Pharynx: No oropharyngeal exudate.   Eyes:      General: No scleral icterus.        Right eye: No discharge.         Left eye: No discharge.      Conjunctiva/sclera: Conjunctivae normal.      Pupils: Pupils are equal, round, and reactive to light.   Neck:      Thyroid: No thyromegaly.      Vascular: No JVD.      Trachea: No tracheal deviation.   Cardiovascular:      Rate and Rhythm: Normal rate and regular rhythm.      Heart sounds: No murmur heard.     No friction rub. No gallop.   Pulmonary:      Effort: Pulmonary effort is normal. No respiratory distress.      Breath sounds: Normal breath sounds. No stridor. No wheezing or rales.   Chest:      Chest wall: No tenderness.   Abdominal:      General: Bowel sounds are normal. There is no distension.      Palpations: Abdomen is  soft. There is no mass.      Tenderness: There is no abdominal tenderness. There is no guarding or rebound.   Musculoskeletal:         General: Tenderness (right lower extremity with dressings over it and a drain) present. Normal range of motion.      Cervical back: Normal range of motion and neck supple.   Lymphadenopathy:      Cervical: No cervical adenopathy.   Skin:     General: Skin is warm.   Neurological:      Mental Status: She is alert and oriented to person, place, and time.      Cranial Nerves: No cranial nerve deficit.      Motor: No abnormal muscle tone.      Coordination: Coordination normal.      Deep Tendon Reflexes: Reflexes normal.          Significant Labs:   Microbiology Results (last 7 days)       Procedure Component Value Units Date/Time    Culture, Anaerobe [[id number]] Collected: 09/25/24 1329    Order Status: Completed Specimen: Ankle, Right Updated: 09/27/24 0927     Anaerobic Culture Culture in progress    Narrative:      1. Right ankle wound -culture    Culture, Anaerobe [[id number]] Collected: 09/25/24 1329    Order Status: Completed Specimen: Ankle, Right Updated: 09/27/24 0926     Anaerobic Culture Culture in progress    Narrative:      3.. Right ankle wound -culture    Culture, Anaerobe [[id number]] Collected: 09/25/24 1329    Order Status: Completed Specimen: Ankle, Right Updated: 09/27/24 0924     Anaerobic Culture Culture in progress    Narrative:      2. . Right ankle wound -culture    Urine culture [[id number]]  (Abnormal)  (Susceptibility) Collected: 09/25/24 0028    Order Status: Completed Specimen: Urine Updated: 09/27/24 0459     Urine Culture, Routine ESCHERICHIA COLI  > 100,000 cfu/ml  No other significant isolate      Narrative:      Specimen Source->Urine    Aerobic culture [[id number]] Collected: 09/25/24 1329    Order Status: Completed Specimen: Ankle, Right Updated: 09/26/24 0734     Aerobic Bacterial Culture No growth    Narrative:      1. Right ankle wound -culture     Aerobic culture [[id number]] Collected: 09/25/24 1329    Order Status: Completed Specimen: Ankle, Right Updated: 09/26/24 0734     Aerobic Bacterial Culture No growth    Narrative:      2. Right ankle wound -culture    Aerobic culture [[id number]] Collected: 09/25/24 1329    Order Status: Completed Specimen: Ankle, Right Updated: 09/26/24 0734     Aerobic Bacterial Culture No growth    Narrative:      3.. Right ankle wound -culture    Fungus culture [[id number]] Collected: 09/25/24 1329    Order Status: Sent Specimen: Ankle, Right Updated: 09/25/24 1418            Significant Imaging: I have reviewed all pertinent imaging results/findings within the past 24 hours.

## 2024-09-28 NOTE — ASSESSMENT & PLAN NOTE
Patient has been on antibiotics for multiple days. Experiencing multiple episodes of water stool with 8 occurrences yesterday.     Plan  Order Cdiff  Special contact precautions until ruled out

## 2024-09-28 NOTE — PROGRESS NOTES
"[facility] Medicine  Progress Note    Patient Name: Lorena Contreras  MRN: [id number]  Patient Class: IP- Inpatient   Admission Date: 9/24/2024  Length of Stay: 3 days  Attending Physician: Rupal Ochoa MD  Primary Care Provider: Jorge Paris MD        Subjective:     Principal Problem:Wound dehiscence        HPI:  Lorena Contreras is a 74 yo F with PMHx of  DM2, Fibromyalgia, lymphoma s/p chemo, HTN, HLD, CVA, MI, CAD s/p PCIs, CKD3b, HFpEF, and anemia who presented to ED for R ankle wound dehiscence. The patient reports that on 8/14/24, she was walking into a [employer] when she lost her balance, fell, and twisted her ankle. She sustained a pelvic fracture and a right pilon fracture, requiring [place] [facility] [place] that [place]. Following the fall, she experienced urinary retention and had an indwelling catheter placed. While at the SNF, the patient reports significant progress, noting that her injuries were healing well, and she regained some mobility. Her wound was evaluated, and sutures were removed on 9/12/24. The patient was discharged home four days ago but describes her experience as "miserable," citing a lack of mobility aids provided for home use. At SNF, she had not been bearing weight on her RLE. She reports while attempting to stand on her RLE with home health her injury opened up. Since then, she has had increased swelling to her R ankle and associated generalized weakness and fatigue, decreased appetite, constipation, SOB (when anxious) and nausea. She denies fevers, chills, rhinorrhea, sore throat, cough, hemoptysis, chest pain, vomiting, diarrhea, or blood in her stool.     ED Course: AFVSS. CBC significant hgb 7.9 (down from 11.8 on month ago). No leukocytosis. CMP with Cr 2.0 (at baseline), transaminitis , , and . BG initially 232, but now 91. CRP 93 and ESR 49. Lactate 0.91. R ankle XR with stable hardware and no acute fx or dislocation, but " "concerning for cellulitis. Ortho consulted and planning for I&D in AM. Given IV zofran. Placed in observation with HM.       Overview/Hospital Course:  Orthopedics consulted and patient being taken to OR today for incision and drainage and washout of right ankle surgical site for wound dehiscence. Patient with no obvious clinical signs of infection.     Interval History: she was in distress this morning as had excess diarrhea again and reports is "covered" in it and waiting for PCT to come assist her and clean her up. She is having some pain to rectal area from frequent stools so steroid cream added BID to help with inflammation and changed to eleanor lomotil now. She had 3 doses immodium yeserady with no improvement of diarrhea. She tolerated rocephin last admit for her UTI as well as vanc for her 2nd UTI and had constipation then and no loose stool on those meds so lower on list of suspicious causes as med induced. Messaged Dr Dhaliwal with ID to see if would recommend pursuing C diff testing and he does recommend to test for this given recent 30 day SNF stay. She doesn't know of anyone around her at SNF with bowel infection but is higher risk in facilities as happened the day of trasnfer here (which was 1 day out of SNF). Labs stable with K mildly low at 3.4 and replacing and Cr 1.6 and improving on IV lasix still as on low flowo xygen still with high 90s sats so as titrated down can likely transition back to oral lasix possible tomorrow vs Monday as approachign euvolemia. Hg improvd to 9.5 after 1 U yesterday and going back to brillinta today and ortho okayed to resume eliquis she was on for ppx after pelvic surgery last month. Will clarify end dates with them on this, given having extended NWB period on ankle, will either need to extend eliquis time or change to asa BID when out of the initial time they wanted her to be on eliquis (later October). On dapto/rocephin per ID recs now with no CX growth so far from ortho " surgery. Charge nursing reaching out to  as need approval for c diff testing to confirm.    Review of Systems   Constitutional:  Negative for chills and fever.   Respiratory:  Negative for cough and shortness of breath.    Cardiovascular:  Negative for chest pain, palpitations and leg swelling.   Gastrointestinal:  Negative for abdominal pain, nausea and vomiting.   Genitourinary:  Negative for difficulty urinating.   Musculoskeletal:  Negative for arthralgias, joint swelling and myalgias.   Skin:  Positive for wound (Right ankle surgical wound).   Neurological:  Positive for weakness (Generalized). Negative for dizziness and light-headedness.   Psychiatric/Behavioral:  Negative for confusion and hallucinations.      Objective:     Vital Signs (Most Recent):  Temp: 97.8 °F (36.6 °C) (09/25/24 0731)  Pulse: 97 (09/25/24 1122)  Resp: 18 (09/25/24 0731)  BP: 157/69 (09/25/24 0731)  SpO2: 93 % (09/25/24 0731) on room air Vital Signs (24h Range):  Temp:  [97.5 °F (36.4 °C)-98.8 °F (37.1 °C)] 97.7 °F (36.5 °C)  Pulse:  [62-94] 94  Resp:  [16-20] 18  SpO2:  [94 %-99 %] 96 %  BP: (137-176)/(61-73) 172/67     Weight: 85.4 kg (188 lb 4.4 oz)  Body mass index is 27.01 kg/m².    Intake/Output Summary (Last 24 hours) at 9/28/2024 1121  Last data filed at 9/28/2024 0632  Gross per 24 hour   Intake 450 ml   Output 1801 ml   Net -1351 ml         Physical Exam  Vitals and nursing note reviewed.   Constitutional:       General: She is awake. She is not in acute distress.     Appearance: Normal appearance. She is normal weight. She is not ill-appearing.      Comments: Patient in distress from loose stool. Patient awake and alert and oriented x 4.    Eyes:      Conjunctiva/sclera: Conjunctivae normal.   Cardiovascular:      Rate and Rhythm: Normal rate and regular rhythm.      Heart sounds: Normal heart sounds. No murmur heard.     No friction rub. No gallop.   Pulmonary:      Effort: Pulmonary effort is normal. No respiratory  "distress.      Breath sounds: Normal breath sounds. No wheezing.      Comments: On oxygen, approaching euvolemia  Abdominal:      General: Abdomen is flat. Bowel sounds are normal. There is no distension.      Palpations: Abdomen is soft.      Tenderness: There is no abdominal tenderness.   Musculoskeletal:      Right lower leg: No edema.      Left lower leg: No edema.      Comments: Right ankle wound vac   Skin:     General: Skin is warm.      Findings: No erythema.      Comments: Bluish coloration to groin areas related to application of Gentian violet   Neurological:      Mental Status: She is alert and oriented to person, place, and time.   Psychiatric:         Mood and Affect: Mood normal.         Behavior: Behavior normal. Behavior is cooperative.         Thought Content: Thought content normal.         Judgment: Judgment normal.             Significant Labs: A1C:   Recent Labs   Lab 07/10/24  1105   HGBA1C 8.2*     CBC:   Recent Labs   Lab 09/27/24  0400 09/28/24  0313   WBC 8.17 8.80   HGB 7.3* 9.5*   HCT 24.2* 29.9*    265     CMP:   Recent Labs   Lab 09/27/24  0400 09/28/24  0313    135*   K 4.2 3.4*    98   CO2 29 29   GLU 88 110   BUN 30* 26*   CREATININE 1.9* 1.6*   CALCIUM 8.5* 8.6*   PROT 5.8* 5.9*   ALBUMIN 2.1* 2.1*   BILITOT 0.3 0.4   ALKPHOS 342* 323*   AST 61* 50*   ALT 69* 53*   ANIONGAP 8 8     Cardiac Markers:   No results for input(s): "CKMB", "MYOGLOBIN", "BNP", "TROPISTAT" in the last 48 hours.      Magnesium:   Recent Labs   Lab 09/27/24  0400 09/28/24  0313   MG 1.9 2.0     POCT Glucose:   Recent Labs   Lab 09/27/24  1351 09/27/24  2052 09/28/24  0729   POCTGLUCOSE 147* 123* 97     Urine Studies:   No results for input(s): "COLORU", "APPEARANCEUA", "PHUR", "SPECGRAV", "PROTEINUA", "GLUCUA", "KETONESU", "BILIRUBINUA", "OCCULTUA", "NITRITE", "UROBILINOGEN", "LEUKOCYTESUR", "RBCUA", "WBCUA", "BACTERIA", "SQUAMEPITHEL", "HYALINECASTS" in the last 48 hours.    Invalid " "input(s): "LUIS"      Significant Imaging: I have reviewed all pertinent imaging results/findings within the past 24 hours.    Assessment/Plan:      * Wound dehiscence, right ankle  Closed right pilon fracture s/p ORIF on 8/14/2024  72 yo female with history of a fall resulting in a pilon fracture to the right ankle on 8/14/24 s/p ORIF. Sutures removed on 09/12/2024 in Ortho clinic. Presented to hospital for swelling and wound dehiscence to right ankle. No leukocytosis. CRP 9.3 and ESR 49. X-ray of right ankle on admit showed Orthopedic hardware intact with no acute fracture or soft tissue swelling.    - Orthopedics consulted in ED:       - Wound irrigated and soft dressings applied in ED.  - Patient non weight bearing to right lower extremity as per Ortho.   - Multimodal pain regimen. Avoid Tylenol as AST and ALT elevated. Oxycodone IR po prn for breakthrough pain.   - Plan for irrigation and debridement of right ankle wound in OR today (9/25/24) with Dr. Davalos and Orthopedics with vac placed and full op note still pending from 9/25.  -vanc/rocephin for coverage with serosanginous drainage reported and ortho note reports ID consulted but they were not so consulted 9/27 given ortho feels concern for infectious given this. ID changed to dapto/rocephin on 9/28 and appreciate assistance.  -NWB until at least 11/1 per ortho-   -prevena wound vac for a week  -was on eliquis until oct 26th post pelvic/ankel fx for ppx, okay to resume per ortho and resumed 9/28  Rec for SNF but she declines as out of days and misses her cats and had bad experience. Family aware       Hypokalemia  Patient's most recent potassium results are listed below.   Recent Labs     09/26/24  0447 09/27/24  0400 09/28/24  0313   K 4.2 4.2 3.4*     Plan  - Replete potassium per protocol  - Monitor potassium Daily  - Patient's hypokalemia is stable  -     Diarrhea  Present on admit. Prn immodium not improving diarrhea x 3 doses 9/27, reports at " least 7-8 BMs a day right now on 9/28 and causing distress and inabliity to work with therapy.   Will try lomotil 9/28 eleanor and discsused with ID, who recs to pursue c diff testing. Charge and nusring notified and messaging  for approval based on algorithms for testing      Acute blood loss anemia  Anemia is likely due to acute blood loss which was from fx and surgery . Most recent hemoglobin and hematocrit are listed below.  Recent Labs     09/26/24  0447 09/27/24  0400 09/28/24  0313   HGB 7.3* 7.3* 9.5*   HCT 23.5* 24.2* 29.9*       Plan  - Monitor serial CBC: Daily  - Transfuse PRBC if patient becomes hemodynamically unstable, symptomatic or H/H drops below 7/21. And will tx 1 U on 9/27 given hovering at 7.3 now and improved after tx to 9.5  - Patient has not received any PRBC transfusions to date  - Patient's anemia is currently stable      Closed right pilon fracture  - see wound dehiscence above     Transaminitis  - Patient on admit labs with , , and  and normal T. Jc.  - Patient denies abdominal pain or vomiting.  - Patient on Lipitor and Tylenol at home and either medication can effect liver so will hold both meds- had similar issue last admit and improved with holding statin  - Acute hepatitis panel on admit negative.   - Trend daily CMP.  - No signs of liver failure. Do not suspect underlying liver disease and likely medication effect.   -improving ast 60, alt 85 and now 85/69 and watch    Stage 3b chronic kidney disease  Creatine stable and at baseline BMP reviewed- noted Estimated Creatinine Clearance: 29.8 mL/min (A) (based on SCr of 2 mg/dL (H)). according to latest data. Based on current GFR, CKD stage is stage 3b. Baseline Cr 1.7-2.0. Monitor UOP and serial BMP and adjust therapy as needed. Renally dose meds. Avoid nephrotoxic medications and procedures.      Acute cystitis without hematuria  - U/A on admit with > 100 WBC and many bacteria consistent with UTI. Urine culture  sent and patient started on IV Ceftriaxone 1 gram daily to treat + vanc given recent semi resistant ecoli + e faecalis last month   - Follow-up urine culture results.- with ecoli and on rocephin    Type 2 diabetes mellitus with stage 3b chronic kidney disease, with long-term current use of insulin  Patient's FSGs are controlled on current medication regimen. Patient on Lantus insulin 19 units in the evening to treat at home.   Last A1c reviewed-   Lab Results   Component Value Date    HGBA1C 8.2 (H) 07/10/2024     Most recent fingerstick glucose reviewed-   Recent Labs   Lab 09/26/24  1527 09/26/24  2126 09/27/24  0718   POCTGLUCOSE 138* 104 97       Current correctional scale  Low  Maintain anti-hyperglycemic dose as follows-   Antihyperglycemics (From admission, onward)      Start     Stop Route Frequency Ordered    09/27/24 2100  insulin glargine U-100 (Lantus) pen 16 Units         -- SubQ Nightly 09/27/24 0821    09/25/24 0008  insulin aspart U-100 pen 0-5 Units         -- SubQ Before meals & nightly PRN 09/24/24 2308          Hold Oral hypoglycemics while patient is in the hospital.  Target blood sugars 140-180 in hospital.  Monitor blood sugars with meals and at bedtime in hospital.   Diabetic diet post-op.   -watch closely as labile and had more lows than highs last admit, and took ozempic home med when was ath ome briefly which decreases her snacking. Dec levemir on 9/27 as glucose below 100     Acute on chronic diastolic heart failure  Patient is identified as having Diastolic (HFpEF) heart failure that is Acute on chronic. CHF is currently uncontrolled due to Pulmonary edema/pleural effusion on CXR. CXR on admit with mild pulmonary congestion and BNP elevated at 1373 on admit. Patient with mild excerebration. Latest ECHO performed and demonstrates- Results for orders placed during the hospital encounter of 11/03/23    Echo    Interpretation Summary    Left Ventricle: The left ventricle is normal in size.  Increased wall thickness. Moderate septal thickening. Normal wall motion. There is normal systolic function with a visually estimated ejection fraction of 65 - 70%. Grade I diastolic dysfunction.    Right Ventricle: Normal right ventricular cavity size. Systolic function is normal.    Left Atrium: Left atrium is severely dilated.    Aortic Valve: There is moderate stenosis. Aortic valve area by VTI is 1.02 cm². Aortic valve peak velocity is 2.59 m/s. Mean gradient is 18 mmHg. The dimensionless index is 0.32.    Mitral Valve: There is mild regurgitation.    Tricuspid Valve: There is mild regurgitation.    Pulmonary Artery: The estimated pulmonary artery systolic pressure is 35 mmHg.    IVC/SVC: Normal venous pressure at 3 mmHg.    - started on IV Lasix 40 mg BID to treat mild heart failure. Excerebration very mild so okay to proceed with surgery as patient not hypoxic and not having any respiratory distress. Watch given mild cr high limit normal on admit but improving 9/26.  - Continue home Imdur and Toprol XL for chronic heart failure and monitor clinical status closely. Monitor on telemetry.   - Patient is off CHF pathway.    - Monitor strict Is&Os and daily weights.    - Place on fluid restriction of 1.5 L. Cardiology has not been consulted.   - Continue to stress to patient importance of self efficacy and  on diet for CHF.   - Last BNP reviewed- and noted below   Recent Labs   Lab 09/25/24  0248   BNP 1,373*     Still on oxygen 927 with signs of mild overload so keep on IV for now    Iron deficiency anemia  Acute blood loss anemia  Anemia is likely due to Iron deficiency and acute blood loss from right ankle. Patient with baseline iron deficiency anemia with baseline Hgb 9-10. Hgb 7.9 on admit and patient reports blood loss from ankle at home when wound dehisced causing worsening anemia. Hgb 7.9 on admit and down to 7.4 on 9/25. Patient typed and crossed and blood consent obtained and 7/3 now, will likely  need transfusion in next day or so given lower 7.s watch to ensure no other bleeding signs.  Recent Labs     09/24/24  1622 09/25/24  0248 09/26/24  0447   HGB 7.9* 7.4* 7.3*   HCT 25.9* 24.1* 23.5*       Plan  - Monitor serial CBC: Daily  - Transfuse PRBC if patient becomes hemodynamically unstable, symptomatic or H/H drops below 7/21.  - Patient has not received any PRBC transfusions to date      Coronary artery disease of native artery of native heart with stable angina pectoris  Patient with known CAD s/p stent placement, which is controlled. Monitor for S/Sx of angina/ACS. Continue to monitor on telemetry. Last discharge was placed on brillinta as home cards told her never go off of it and had asa held until oct 26 when goes off eilquis to avoid triple therapy  Had asa started 9/25 post op, will plan to adjust back to brillinta given this over weekedn as hg lower 7's and will do this slowly while titrating back on to regimen was sent home last admit (eliquis until oct 26th and brillinta)  -Had stents placed in 2018 to rca and 2020 to 2 areas per dr cervantes note from [facility], had stress in 2022 that was negative for ischemia. She said he wanted to recheck another one recently but insurance did not cover. She said had aspirin allergy testing before it was restarted due to prev intolerance after a stent   -will go back to brillinta 9/28 as previous plan, and hold asa to avoid triple therapy while on eliquis as above. Will need to find out ortho plan if want to keep on eliquis longer now given longer NWB status with this ankle dehisc, vs going to ASA BID after oct 26 which was initial period of eliquis for co-ppx for ankle and pelvic fx    Mixed hyperlipidemia  Chronic and controlled. Hold home Lipitor for now due elevated LFT's.     Follicular lymphoma  S/p chemo in 2010. In remission.     Primary hypertension  Chronic, controlled. Latest blood pressure and vitals reviewed-     Temp:  [97.5 °F (36.4 °C)-98.8 °F  (37.1 °C)]   Pulse:  [62-94]   Resp:  [16-20]   BP: (137-176)/(61-73)   SpO2:  [94 %-99 %] .   Home meds for hypertension were reviewed and noted below.   Hypertension Medications               isosorbide mononitrate (IMDUR) 60 MG 24 hr tablet Take 1 tablet (60 mg total) by mouth once daily.    metoprolol succinate (TOPROL-XL) 25 MG 24 hr tablet Take 1 tablet (25 mg total) by mouth once daily.            While in the hospital, will manage blood pressure as follows; Continue home antihypertensive regimen to treat in hospital.  -BP higher 9/26, had hydralazine added to isorbide/metoprolol last admit so start hydralazine now as BP 180s and she reporst pain minimal  -improving 9/27 but higher again 9/28 so will inc to 100    Will utilize p.r.n. blood pressure medication only if patient's blood pressure greater than 180/110 and she develops symptoms such as worsening chest pain or shortness of breath.      VTE Risk Mitigation (From admission, onward)           Ordered     apixaban tablet 2.5 mg  2 times daily         09/27/24 1238     IP VTE HIGH RISK PATIENT  Once         09/24/24 2231     Reason for No Pharmacological VTE Prophylaxis  Once        Question:  Reasons:  Answer:  Already adequately anticoagulated on oral Anticoagulants    09/24/24 2231                    Discharge Planning   MIKHAIL: 9/30/2024     Code Status: Full Code   Is the patient medically ready for discharge?:     Reason for patient still in hospital (select all that apply): Patient trending condition  Discharge Plan A: Home with family, Home Health                  Rupal Ochoa MD  Department of Hospital Medicine   [facility] - Surgery

## 2024-09-28 NOTE — CONSULTS
[facility] - Surgery  Infectious Disease  Consult Note    Patient Name: Lorena Contreras  MRN: [id number]  Admission Date: 9/24/2024  Hospital Length of Stay: 2 days  Attending Physician: Rupal Ochoa MD  Primary Care Provider: Jorge Paris MD     Isolation Status: No active isolations    Patient information was obtained from patient, past medical records, and ER records.      Inpatient consult to Infectious Diseases  Consult performed by: William Dhaliwal MD  Consult ordered by: Rupal Ochoa MD        Assessment/Plan:     Orthopedic  * Wound dehiscence, right ankle  73 year old female with a history of right pilon fracture s/p ORIF on 8/14/24.  She is now admitted with wound dehiscence.  She underwent I&D on 9/25/24.  Surgical cultures are still negative to date.    Plan  Will discontinue vancomycin in order to limit potential nephrotoxicity.  Start daptomycin IV  Continue ceftriaxone IV  Will follow up on surgical cultures.        Thank you for your consult. I will follow-up with patient. Please contact us if you have any additional questions.    William Dhaliwal MD  Infectious Disease  [facility] - Surgery    Subjective:     Principal Problem: Wound dehiscence    HPI:  Lorena Contreras is a 73 year old with poorly controlled DM (A1c 8.2), HTN, CAD s/p PCIs, CKD3, HFpEF, history of CVA, history of lymphoma s/p chemo, and anemia, who is s/p ORIF of right pilon fracture (8/14) and ORIF right acetabulum fracture (8/16) after a fall, c/b urinary retention requiring indwelling chong.  Initially d/c to SNF, then d/c to home four days prior to admission. On 9/22 she bumped the ankle while getting into the car and the wound opened.  She presented to ED on 9/23 and is now s/p I&D on 9/25.  ID consulted for antibiotic recommendations.            Past Medical History:   Diagnosis Date    Allergy     Altered mental status 06/19/2022    DYSARTHRIA, SPASTIC MOVEMENTS & DIFFICULTY SWALLOWING    Anemia      Anxiety     Arthritis     Cataract     both removed    Colon polyps     Coronary artery disease     Depression     Diabetes mellitus, type II     Disorder of kidney and ureter     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/2023    Fibromyalgia     Follicular lymphoma     GERD (gastroesophageal reflux disease)     HTN (hypertension)     Hyperlipidemia     MI (myocardial infarction) 03/2019    Personal history of colonic polyps     Restless leg syndrome     Stroke        Past Surgical History:   Procedure Laterality Date    APPLICATION OF WOUND VACUUM-ASSISTED CLOSURE DEVICE Right 9/25/2024    Procedure: APPLICATION, WOUND VAC;  Surgeon: Neto Davalos MD;  Location: [address];  Service: Orthopedics;  Laterality: Right;    COLONOSCOPY  11/07/2012    Colon polyp found; repeat in 5 years    DEBRIDEMENT OF LOCAL FLAP Right 9/25/2024    Procedure: DEBRIDEMENT, LOCAL FLAP;  Surgeon: Neto Davalos MD;  Location: [facility] OR [address] [place] [address];  Service: Orthopedics;  Laterality: Right;    ELBOW SURGERY Right 2015    dislocation repair     ESOPHAGOGASTRODUODENOSCOPY  11/07/2012    atrophic gastritis, H pylori testing negative    INCISION AND DRAINAGE FOOT Right 6/2/2021    Procedure: INCISION AND DRAINAGE, FOOT, bone biopsy;  Surgeon: Quiana Penn DPM;  Location: [facility] OR;  Service: Podiatry;  Laterality: Right;    IRRIGATION AND DEBRIDEMENT OF LOWER EXTREMITY Right 9/25/2024    Procedure: IRRIGATION AND DEBRIDEMENT, LOWER EXTREMITY; slider table, supine, bone foam, cysto tubing, 6L NS/dakins/peroxide, culture swabs;  Surgeon: Neto Davalos MD;  Location: [address];  Service: Orthopedics;  Laterality: Right;    KNEE SURGERY Bilateral 2015    scoped    LEFT HEART CATHETERIZATION Left 3/29/2019    Procedure: Left heart cath;  Surgeon: Bladimir Barbosa MD;  Location: [facility] CATH LAB;  Service: Cardiology;  Laterality: Left;    LEFT HEART CATHETERIZATION Left 11/18/2019    Procedure: Left heart cath;   Surgeon: Karl Rico MD;  Location: [facility] CATH LAB;  Service: Cardiology;  Laterality: Left;    LEFT HEART CATHETERIZATION Left 1/8/2020    Procedure: Left heart cath, right radial, noon start;  Surgeon: Christos Monreal MD;  Location: [facility] CATH LAB;  Service: Cardiology;  Laterality: Left;  RN Pre Op 1-6-20.  To be admitted 1-7-20 sor Aspirin Disensitation    OPEN REDUCTION AND INTERNAL FIXATION (ORIF) OF FRACTURE OF ACETABULUM Right 8/16/2024    Procedure: ORIF, FRACTURE, ACETABULUM;  Surgeon: Neto Davalos MD;  Location: [facility] OR [address] [place] [address];  Service: Orthopedics;  Laterality: Right;  anterior and lateral pelvic incisions    OPEN REDUCTION AND INTERNAL FIXATION (ORIF) OF PILON FRACTURE Right 8/14/2024    Procedure: ORIF, FRACTURE, PILON;  Surgeon: Neto Davalos MD;  Location: [facility] OR [address] [place] [address];  Service: Orthopedics;  Laterality: Right;    TONSILLECTOMY  1955    ULTRASOUND GUIDANCE  1/8/2020    Procedure: Ultrasound Guidance;  Surgeon: Christos Monreal MD;  Location: [facility] CATH LAB;  Service: Cardiology;;       Review of patient's allergies indicates:   Allergen Reactions    Novolin 70/30 (semi-synthetic) Nausea And Vomiting     Severe vomiting on Relion 70/30    Sulfa (sulfonamide antibiotics) Anaphylaxis    Talwin [pentazocine lactate] Anaphylaxis    Victoza [liraglutide] Nausea And Vomiting    Glipizide Nausea Only    Citric acid     Codeine     Influenza virus vaccines Hives    Iodine and iodide containing products Hives    Levetiracetam Itching    Neurontin [gabapentin]      Possible associated myoclonic jerk    Rituxan [rituximab] Hives    Zoloft [sertraline] Nausea And Vomiting       Medications:  Medications Prior to Admission   Medication Sig    metoprolol succinate (TOPROL-XL) 25 MG 24 hr tablet Take 1 tablet (25 mg total) by mouth once daily.    acetaminophen (TYLENOL) 500 MG tablet Take 1 tablet (500 mg total) by mouth every 8 (eight) hours.    albuterol (PROVENTIL/VENTOLIN HFA) 90  mcg/actuation inhaler Inhale 2 puffs into the lungs every 6 (six) hours as needed for Wheezing or Shortness of Breath.    aluminum & magnesium hydroxide-simethicone (MYLANTA MAX STRENGTH) 400-400-40 mg/5 mL suspension Take 30 mLs by mouth every 6 (six) hours as needed for Indigestion.    apixaban (ELIQUIS) 2.5 mg Tab Take 1 tablet (2.5 mg total) by mouth 2 (two) times daily. End date October 26th 2024    aspirin 81 MG Chew Take 1 tablet (81 mg total) by mouth once daily. Hold to avoid bleed risk on triple therapy and then resume on oct 27 once eliquis stops on oct 26th    atorvastatin (LIPITOR) 80 MG tablet Take 1 tablet by mouth in the evening    calcium carbonate (CALCIUM ANTACID) 300 mg (750 mg) Chew Take 1 750 mg tablet daily    DEXCOM G7  Misc Use 1  to track blood glucose, ICD10: E11.65    DEXCOM G7 SENSOR Samina Use 1 sensor every 10 days to track blood glucose, ICD10: E11.65, okay with 90 day supply if possible    divalproex (DEPAKOTE SPRINKLES) 125 mg capsule Take 2 capsules (250 mg total) by mouth every 8 (eight) hours. Per psych MD can stop while at SNF if doing well without any altered mental status while there but continue on discharge from hospital for now    FLUoxetine 40 MG capsule Take 1 capsule (40 mg total) by mouth once daily.    hydrOXYzine HCL (ATARAX) 25 MG tablet Take 1 tablet (25 mg total) by mouth 3 (three) times daily as needed for Itching.    insulin aspart U-100 (NOVOLOG) 100 unit/mL (3 mL) InPn pen Inject 0-10 Units into the skin before meals and at bedtime as needed (Hyperglycemia).    insulin glargine U-100, Lantus, 100 unit/mL (3 mL) SubQ InPn pen Inject 19 Units into the skin every evening.    isosorbide mononitrate (IMDUR) 60 MG 24 hr tablet Take 1 tablet (60 mg total) by mouth once daily.    melatonin (MELATIN) 3 mg tablet Take 2 tablets (6 mg total) by mouth nightly.    methocarbamoL (ROBAXIN) 500 MG Tab Take 1 tablet (500 mg total) by mouth 4 (four) times daily.     "ondansetron (ZOFRAN-ODT) 8 MG TbDL Take 1 tablet (8 mg total) by mouth every 8 (eight) hours as needed (nausea).    oxyCODONE (ROXICODONE) 10 mg Tab immediate release tablet Take 1 tablet (10 mg total) by mouth every 6 (six) hours as needed (pain scale 7-10).    oxyCODONE (ROXICODONE) 5 MG immediate release tablet Take 1 tablet (5 mg total) by mouth every 6 (six) hours as needed (pain scale 4-6).    OZEMPIC 1 mg/dose (4 mg/3 mL) Inject 1 mg into the skin every 7 days. HOLD while at [facility]    pantoprazole (PROTONIX) 40 MG tablet Take 1 tablet (40 mg total) by mouth once daily.    pen needle, diabetic (BD ULTRA-FINE SAGAR PEN NEEDLE) 32 gauge x 5/32" Ndle One pen needle use with insulin pen 4 times a day.  ICD-10: E11.9    polyethylene glycol (GLYCOLAX) 17 gram PwPk Take 17 g by mouth daily as needed for Constipation.    QUEtiapine (SEROQUEL) 50 MG tablet Take 1 tablet (50 mg total) by mouth every evening.    semaglutide (OZEMPIC) 0.25 mg or 0.5 mg (2 mg/3 mL) pen injector Inject 0.5 mg once weekly thereafter    HOLD at [facility]    ticagrelor (BRILINTA) 90 mg tablet Take 1 tablet (90 mg total) by mouth 2 (two) times daily.    [DISCONTINUED] amoxicillin-clavulanate 875-125mg (AUGMENTIN) 875-125 mg per tablet Take 1 tablet by mouth 2 (two) times daily.     Antibiotics (From admission, onward)      Start     Stop Route Frequency Ordered    09/26/24 0833  vancomycin - pharmacy to dose  (vancomycin IVPB (PEDS and ADULTS))        Placed in "And" Linked Group    -- IV pharmacy to manage frequency 09/26/24 0734    09/25/24 1000  cefTRIAXone (Rocephin) 1 g in D5W 100 mL IVPB (MB+)         -- IV Every 24 hours (non-standard times) 09/25/24 0839          Antifungals (From admission, onward)      None          Antivirals (From admission, onward)      None             Immunization History   Administered Date(s) Administered    PPD Test 08/15/2024    Pneumococcal Conjugate - 13 Valent 11/28/2016    Pneumococcal Polysaccharide - 23 Valent " 02/23/2012, 02/01/2018    Tdap 02/23/2012, 11/27/2012, 05/28/2021       Family History       Problem Relation (Age of Onset)    Allergy (severe) Daughter    Cancer Mother, Father    Diabetes Sister    Heart disease Mother    Hypertension Sister    Lung cancer Brother    No Known Problems Daughter          Social History     Socioeconomic History    Marital status:     Number of children: 2   Occupational History    Occupation: house wife    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  2 dtr.  Lives with .  3 cats and a dog.  Retired.  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  Lives in house, 1 story and 4 steps up and has a ramp.      Enjoys crafting.  Unable to bowl due to myalgias.       Social Determinants of Health     Financial Resource Strain: Low Risk  (8/14/2024)    Overall Financial Resource Strain (CARDIA)     Difficulty of Paying Living Expenses: Not hard at all   Food Insecurity: No Food Insecurity (8/14/2024)    Hunger Vital Sign     Worried About Running Out of Food in the Last Year: Never true     Ran Out of Food in the Last Year: Never true   Transportation Needs: No Transportation Needs (8/14/2024)    TRANSPORTATION NEEDS     Transportation : No   Physical Activity: Inactive (8/14/2024)    Exercise Vital Sign     Days of Exercise per Week: 0 days     Minutes of Exercise per Session: 0 min   Stress: No Stress Concern Present (8/14/2024)    [employer] [place] [employer] - Occupational Stress Questionnaire     Feeling of Stress : Not at all   Housing Stability: Low Risk  (8/14/2024)    Housing Stability Vital Sign     Unable to Pay for Housing in the Last Year: No     Homeless in the Last Year: No     Review of Systems   Musculoskeletal:   Positive for arthralgias.   Skin:  Positive for wound.   All other systems reviewed and are negative.    Objective:     Vital Signs (Most Recent):  Temp: 98.3 °F (36.8 °C) (09/27/24 2056)  Pulse: 87 (09/27/24 2056)  Resp: 17 (09/27/24 2056)  BP: (!) 153/70 (09/27/24 2116)  SpO2: 98 % (09/27/24 2056) Vital Signs (24h Range):  Temp:  [97.5 °F (36.4 °C)-98.8 °F (37.1 °C)] 98.3 °F (36.8 °C)  Pulse:  [74-93] 87  Resp:  [16-20] 17  SpO2:  [91 %-98 %] 98 %  BP: (139-176)/(63-73) 153/70     Weight: 85.4 kg (188 lb 4.4 oz)  Body mass index is 27.01 kg/m².    Estimated Creatinine Clearance: 31.3 mL/min (A) (based on SCr of 1.9 mg/dL (H)).     Physical Exam  Vitals and nursing note reviewed.   Constitutional:       General: She is not in acute distress.     Appearance: She is well-developed. She is not diaphoretic.   HENT:      Head: Normocephalic and atraumatic.      Right Ear: External ear normal.      Left Ear: External ear normal.      Nose: Nose normal.      Mouth/Throat:      Pharynx: No oropharyngeal exudate.   Eyes:      General: No scleral icterus.        Right eye: No discharge.         Left eye: No discharge.      Conjunctiva/sclera: Conjunctivae normal.      Pupils: Pupils are equal, round, and reactive to light.   Neck:      Thyroid: No thyromegaly.      Vascular: No JVD.      Trachea: No tracheal deviation.   Cardiovascular:      Rate and Rhythm: Normal rate and regular rhythm.      Heart sounds: No murmur heard.     No friction rub. No gallop.   Pulmonary:      Effort: Pulmonary effort is normal. No respiratory distress.      Breath sounds: Normal breath sounds. No stridor. No wheezing or rales.   Chest:      Chest wall: No tenderness.   Abdominal:      General: Bowel sounds are normal. There is no distension.      Palpations: Abdomen is soft. There is no mass.      Tenderness: There is no abdominal tenderness. There is no guarding or rebound.   Musculoskeletal:         General: Tenderness (right lower extremity  with dressings over it and a drain) present. Normal range of motion.      Cervical back: Normal range of motion and neck supple.   Lymphadenopathy:      Cervical: No cervical adenopathy.   Skin:     General: Skin is warm.   Neurological:      Mental Status: She is alert and oriented to person, place, and time.      Cranial Nerves: No cranial nerve deficit.      Motor: No abnormal muscle tone.      Coordination: Coordination normal.      Deep Tendon Reflexes: Reflexes normal.          Significant Labs:   Microbiology Results (last 7 days)       Procedure Component Value Units Date/Time    Culture, Anaerobe [[id number]] Collected: 09/25/24 1329    Order Status: Completed Specimen: Ankle, Right Updated: 09/27/24 0927     Anaerobic Culture Culture in progress    Narrative:      1. Right ankle wound -culture    Culture, Anaerobe [[id number]] Collected: 09/25/24 1329    Order Status: Completed Specimen: Ankle, Right Updated: 09/27/24 0926     Anaerobic Culture Culture in progress    Narrative:      3.. Right ankle wound -culture    Culture, Anaerobe [[id number]] Collected: 09/25/24 1329    Order Status: Completed Specimen: Ankle, Right Updated: 09/27/24 0924     Anaerobic Culture Culture in progress    Narrative:      2. . Right ankle wound -culture    Urine culture [[id number]]  (Abnormal)  (Susceptibility) Collected: 09/25/24 0028    Order Status: Completed Specimen: Urine Updated: 09/27/24 0459     Urine Culture, Routine ESCHERICHIA COLI  > 100,000 cfu/ml  No other significant isolate      Narrative:      Specimen Source->Urine    Aerobic culture [[id number]] Collected: 09/25/24 1329    Order Status: Completed Specimen: Ankle, Right Updated: 09/26/24 0734     Aerobic Bacterial Culture No growth    Narrative:      1. Right ankle wound -culture    Aerobic culture [[id number]] Collected: 09/25/24 1329    Order Status: Completed Specimen: Ankle, Right Updated: 09/26/24 0734     Aerobic Bacterial Culture No growth     Narrative:      2. Right ankle wound -culture    Aerobic culture [[id number]] Collected: 09/25/24 1329    Order Status: Completed Specimen: Ankle, Right Updated: 09/26/24 0734     Aerobic Bacterial Culture No growth    Narrative:      3.. Right ankle wound -culture    Fungus culture [[id number]] Collected: 09/25/24 1329    Order Status: Sent Specimen: Ankle, Right Updated: 09/25/24 1418            Significant Imaging: I have reviewed all pertinent imaging results/findings within the past 24 hours.

## 2024-09-28 NOTE — ASSESSMENT & PLAN NOTE
73 year old female with a history of right pilon fracture s/p ORIF on 8/14/24.  She is now admitted with wound dehiscence.  She underwent I&D on 9/25/24.  Surgical cultures are still negative to date.    Plan  Will discontinue vancomycin in order to limit potential nephrotoxicity.  Start daptomycin IV  Continue ceftriaxone IV  Will follow up on surgical cultures.

## 2024-09-28 NOTE — NURSING
Nurses Note -- 4 Eyes      9/28/2024   8:06 AM      Skin assessed during: Daily Assessment      [] No Altered Skin Integrity Present    []Prevention Measures Documented      [x] Yes- Altered Skin Integrity Present or Discovered   [x] LDA Added if Not in Epic (Describe Wound)   [] New Altered Skin Integrity was Present on Admit and Documented in LDA   [] Wound Image Taken    Wound Care Consulted? Yes    Attending Nurse:  FRANCIS Salazar    Second RN/Staff Member:   FRANCIS Galarza

## 2024-09-28 NOTE — ASSESSMENT & PLAN NOTE
Patient with known CAD s/p stent placement, which is controlled. Monitor for S/Sx of angina/ACS. Continue to monitor on telemetry. Last discharge was placed on brillinta as home cards told her never go off of it and had asa held until oct 26 when goes off eilquis to avoid triple therapy  Had asa started 9/25 post op, will plan to adjust back to brillinta given this over weekedn as hg lower 7's and will do this slowly while titrating back on to regimen was sent home last admit (eliquis until oct 26th and brillinta)  -Had stents placed in 2018 to rca and 2020 to 2 areas per dr cervantes note from [facility], had stress in 2022 that was negative for ischemia. She said he wanted to recheck another one recently but insurance did not cover. She said had aspirin allergy testing before it was restarted due to prev intolerance after a stent   -will go back to brillinta 9/28 as previous plan, and hold asa to avoid triple therapy while on eliquis as above. Will need to find out ortho plan if want to keep on eliquis longer now given longer NWB status with this ankle dehisc, vs going to ASA BID after oct 26 which was initial period of eliquis for co-ppx for ankle and pelvic fx

## 2024-09-28 NOTE — ASSESSMENT & PLAN NOTE
Anemia is likely due to acute blood loss which was from fx and surgery . Most recent hemoglobin and hematocrit are listed below.  Recent Labs     09/26/24  0447 09/27/24  0400 09/28/24  0313   HGB 7.3* 7.3* 9.5*   HCT 23.5* 24.2* 29.9*       Plan  - Monitor serial CBC: Daily  - Transfuse PRBC if patient becomes hemodynamically unstable, symptomatic or H/H drops below 7/21. And will tx 1 U on 9/27 given hovering at 7.3 now and improved after tx to 9.5  - Patient has not received any PRBC transfusions to date  - Patient's anemia is currently stable

## 2024-09-28 NOTE — PLAN OF CARE
Pt x4, Bart  Problem: Adult Inpatient Plan of Care  Goal: Plan of Care Review  Outcome: Progressing  Goal: Patient-Specific Goal (Individualized)  Outcome: Progressing  Goal: Absence of Hospital-Acquired Illness or Injury  Outcome: Progressing  Goal: Optimal Comfort and Wellbeing  Outcome: Progressing

## 2024-09-28 NOTE — ASSESSMENT & PLAN NOTE
Lorena Contreras is a 73 y.o. female s/p ORIF right pilon fx presents with right medial ankle wound dehiscence.  Presented afebrile with labs significant for WBC 8.80, ESR  , CRP  .  On physical exam, medial ankle surgical wound noted to be open and draining serosanguinous fluid. Incision was irrigated with 4 L of normal saline. Sterile saline soaked gauze and soft dressings were applied to wound.  Patient's extremity was elevated.     S/p I&D R ankle 9/25/24.    Plan:  - Admit to    - Abx: vanc and rocephin  - ID consulted, appreciate recs  - Cultures NGTD  - NWB RLE x 10 weeks from date of ORIF (8/14/24)  - Prevena at 75mmHg, f/u 1 week for removal  - Kaycee: MARYBETH

## 2024-09-28 NOTE — PT/OT/SLP PROGRESS
"Physical Therapy Treatment/co treat with OT    Patient Name:  Lorena Contreras   MRN:  [id number]    Recommendations:     Discharge Recommendations: Moderate Intensity Therapy  Discharge Equipment Recommendations: none  Barriers to discharge: Decreased caregiver support  Requires assist for mobility  Assessment:     Lorena Contreras is a 73 y.o. female admitted with a medical diagnosis of Wound dehiscence.  She presents with the following impairments/functional limitations: weakness, impaired endurance, pain, orthopedic precautions, decreased lower extremity function, impaired functional mobility . Pt is unsafe with functional mobility at this time due to pt requires SBA for bed mobility, minimal to max assist for slide board transfers, and supervision for w/c mobility to manage lines due to pt has wound vac and IV. Pt is motivated to progress with functional mobility.     Rehab Prognosis: Good; patient would benefit from acute skilled PT services to address these deficits and reach maximum level of function.    Recent Surgery: Procedure(s) (LRB):  IRRIGATION AND DEBRIDEMENT, LOWER EXTREMITY; slider table, supine, bone foam, cysto tubing, 6L NS/dakins/peroxide, culture swabs (Right)  APPLICATION, WOUND VAC (Right)  DEBRIDEMENT, LOCAL FLAP (Right) 3 Days Post-Op    Plan:     During this hospitalization, patient to be seen 4 x/week to address the identified rehab impairments via therapeutic activities, therapeutic exercises, neuromuscular re-education, gait training, wheelchair management/training and progress toward the following goals:    Plan of Care Expires:  10/26/24    Subjective   "I am still having diarrhea but I will get up"    Pain/Comfort:  Pain Rating 1: 5/10 (at rest in supine)  Location - Side 1: Right  Location - Orientation 1: generalized  Location 1: leg  Pain Addressed 1: Reposition, Cessation of Activity, Pre-medicate for activity  Pain Rating Post-Intervention 1: 4/10      Objective: "     Communicated with nurse prior to session.  Patient found HOB elevated with wound vac, PureWick, peripheral IV, oxygen upon PT entry to room.     General Precautions: Standard, fall  Orthopedic Precautions: RLE non weight bearing  Braces: N/A  Respiratory Status: Nasal cannula, flow 3 L/min     Functional Mobility:  Bed Mobility:     Supine to Sit: stand by assistance  Sit to Supine: stand by assistance  Transfers:     Bed to/from wheel Chair: minimum assistance from bed to w/c (high to low surface) and maximal assistance from w/c to bed (low to high surface) with  slide board    Pt propelled w/c 60ft x 2 trials on level surface with B UE with supervision to manage lines. Pt rested in sitting due to fatigue and SOB between trials.    AM-PAC 6 CLICK MOBILITY  Turning over in bed (including adjusting bedclothes, sheets and blankets)?: 3  Sitting down on and standing up from a chair with arms (e.g., wheelchair, bedside commode, etc.): 2  Moving from lying on back to sitting on the side of the bed?: 3  Moving to and from a bed to a chair (including a wheelchair)?: 2  Need to walk in hospital room?: 1  Climbing 3-5 steps with a railing?: 1  Basic Mobility Total Score: 12     Treatment & Education:  Pt sat on the EOB with supervision and was able to assist with placing the sliding board prior to transfer to/from w/c  Co-treat with OT due to medical complexity of pt and need for skilled hands for safe intervention.   Patient left HOB elevated (pt did not want to remain in w/c due to bowel issues) with all lines intact, call button in reach, and nurse notified..    GOALS:   Multidisciplinary Problems       Physical Therapy Goals          Problem: Physical Therapy    Goal Priority Disciplines Outcome Interventions   Physical Therapy Goal     PT, PT/OT Progressing    Description: Goals to be met by: 10/26/2024     Patient will increase functional independence with mobility by performin. Supine to sit with Modified  Lamar  2. Sit to supine with Modified Lamar  3. Sit to stand transfer with Minimal Assistance  4. Bed to chair transfer with Minimal Assistance using LRAD  5. Gait  x 5 feet with Moderate Assistance using LRAD.   6. Wheelchair propulsion x100 feet with Supervision using bilateral upper extremities  7. Pt to transfer bed to/from w/c with sliding board with minimal assist with NWB R LE                         Time Tracking:     PT Received On: 09/28/24  PT Start Time: 1120     PT Stop Time: 1159  PT Total Time (min): 39 min     Billable Minutes: Therapeutic Activity 16 and Train/Wheelchair Management 23    Treatment Type: Treatment  PT/PTA: PT           09/28/2024

## 2024-09-28 NOTE — PLAN OF CARE
Problem: Adult Inpatient Plan of Care  Goal: Plan of Care Review  Outcome: Progressing  Goal: Patient-Specific Goal (Individualized)  Outcome: Progressing  Goal: Absence of Hospital-Acquired Illness or Injury  Outcome: Progressing  Goal: Optimal Comfort and Wellbeing  Outcome: Progressing  Goal: Readiness for Transition of Care  Outcome: Progressing     Problem: Infection  Goal: Absence of Infection Signs and Symptoms  Outcome: Progressing     Problem: Diabetes Comorbidity  Goal: Blood Glucose Level Within Targeted Range  Outcome: Progressing     Problem: Acute Kidney Injury/Impairment  Goal: Fluid and Electrolyte Balance  Outcome: Progressing  Goal: Improved Oral Intake  Outcome: Progressing  Goal: Effective Renal Function  Outcome: Progressing     Problem: Wound  Goal: Optimal Coping  Outcome: Progressing  Goal: Optimal Functional Ability  Outcome: Progressing  Goal: Absence of Infection Signs and Symptoms  Outcome: Progressing  Goal: Improved Oral Intake  Outcome: Progressing  Goal: Optimal Pain Control and Function  Outcome: Progressing  Goal: Skin Health and Integrity  Outcome: Progressing  Goal: Optimal Wound Healing  Outcome: Progressing     Problem: Skin Injury Risk Increased  Goal: Skin Health and Integrity  Outcome: Progressing     Problem: Fall Injury Risk  Goal: Absence of Fall and Fall-Related Injury  Outcome: Progressing   Pt aaox4, pain well controlled, denies any new or worsening pain. Pt does report abdominal discomfort and has experienced 2 episodes of diarrhea during shift. Glucose levels well managed, no PRN insulin administered. Hemovac drain output is 0 cc throughout shift. Pt is currently resting in bed comfortably with no signs of acute distress. Safety measures in place, instructed to call for any needs or concerns, bed in the lowest position with wheels locked, side rails up x2, call light and personal belongings within reach. Continue plan of care.

## 2024-09-28 NOTE — PROGRESS NOTES
[facility] - Surgery  Infectious Disease  Progress Note    Patient Name: Lorena Contreras  MRN: [id number]  Admission Date: 9/24/2024  Length of Stay: 3 days  Attending Physician: Rupal Ochoa MD  Primary Care Provider: Jorge Paris MD    Isolation Status: Special Contact  Assessment/Plan:      GI  Diarrhea  Patient has been on antibiotics for multiple days. Experiencing multiple episodes of water stool with 8 occurrences yesterday.     Plan  Order Cdiff  Special contact precautions until ruled out    Orthopedic  * Wound dehiscence, right ankle  73 year old female with a history of right pilon fracture s/p ORIF on 8/14/24.  She is now admitted with wound dehiscence.  She underwent I&D on 9/25/24.  Surgical cultures are still negative to date.    Plan  Continue Daptomycin IV  Stop ceftriaxone IV  Will follow up on surgical cultures. So far ngtd  Will discuss with orthopedics if I&D had findings concerning for infection to help assess needs for antibiotics        Discussed plan with primary team directly.     Thank you for your consult. I will follow-up with patient. Please contact us if you have any additional questions.    Lisa Foreman MD  Infectious Disease  [facility] - Surgery    Subjective:     Principal Problem:Wound dehiscence    HPI:  Lorena Contreras is a 73 year old with poorly controlled DM (A1c 8.2), HTN, CAD s/p PCIs, CKD3, HFpEF, history of CVA, history of lymphoma s/p chemo, and anemia, who is s/p ORIF of right pilon fracture (8/14) and ORIF right acetabulum fracture (8/16) after a fall, c/b urinary retention requiring indwelling chong.  Initially d/c to SNF, then d/c to home four days prior to admission. On 9/22 she bumped the ankle while getting into the car and the wound opened.  She presented to ED on 9/23 and is now s/p I&D on 9/25.  ID consulted for antibiotic recommendations.          Interval history: NAEO, WBC 8.80. On dapto/ceftriaxone. Right ankle wound vac with some pain. I&D  cultures in process. Reports she has had 8 episodes of loose stool.     Review of Systems   Gastrointestinal:  Positive for diarrhea.   Musculoskeletal:  Positive for arthralgias.   Skin:  Positive for wound.   All other systems reviewed and are negative.    Objective:     Vital Signs (Most Recent):  Temp: 98.1 °F (36.7 °C) (09/28/24 1125)  Pulse: 74 (09/28/24 1441)  Resp: 18 (09/28/24 1125)  BP: (!) 175/71 (09/28/24 1125)  SpO2: 99 % (09/28/24 1404) Vital Signs (24h Range):  Temp:  [97.7 °F (36.5 °C)-98.3 °F (36.8 °C)] 98.1 °F (36.7 °C)  Pulse:  [62-94] 74  Resp:  [16-18] 18  SpO2:  [96 %-99 %] 99 %  BP: (137-175)/(61-71) 175/71     Weight: 85.4 kg (188 lb 4.4 oz)  Body mass index is 27.01 kg/m².    Estimated Creatinine Clearance: 37.2 mL/min (A) (based on SCr of 1.6 mg/dL (H)).     Physical Exam  Vitals and nursing note reviewed.   Constitutional:       General: She is not in acute distress.     Appearance: She is well-developed. She is not diaphoretic.   HENT:      Head: Normocephalic and atraumatic.      Right Ear: External ear normal.      Left Ear: External ear normal.      Nose: Nose normal.      Mouth/Throat:      Pharynx: No oropharyngeal exudate.   Eyes:      General: No scleral icterus.        Right eye: No discharge.         Left eye: No discharge.      Conjunctiva/sclera: Conjunctivae normal.      Pupils: Pupils are equal, round, and reactive to light.   Neck:      Thyroid: No thyromegaly.      Vascular: No JVD.      Trachea: No tracheal deviation.   Cardiovascular:      Rate and Rhythm: Normal rate and regular rhythm.      Heart sounds: No murmur heard.     No friction rub. No gallop.   Pulmonary:      Effort: Pulmonary effort is normal. No respiratory distress.      Breath sounds: Normal breath sounds. No stridor. No wheezing or rales.   Chest:      Chest wall: No tenderness.   Abdominal:      General: Bowel sounds are normal. There is no distension.      Palpations: Abdomen is soft. There is no mass.       Tenderness: There is no abdominal tenderness. There is no guarding or rebound.   Musculoskeletal:         General: Tenderness (right lower extremity with dressings over it and a drain) present. Normal range of motion.      Cervical back: Normal range of motion and neck supple.   Lymphadenopathy:      Cervical: No cervical adenopathy.   Skin:     General: Skin is warm.   Neurological:      Mental Status: She is alert and oriented to person, place, and time.      Cranial Nerves: No cranial nerve deficit.      Motor: No abnormal muscle tone.      Coordination: Coordination normal.      Deep Tendon Reflexes: Reflexes normal.          Significant Labs:   Microbiology Results (last 7 days)       Procedure Component Value Units Date/Time    Aerobic culture [[id number]] Collected: 09/25/24 1329    Order Status: Completed Specimen: Ankle, Right Updated: 09/28/24 1158     Aerobic Bacterial Culture No growth    Narrative:      2. Right ankle wound -culture    Clostridium difficile EIA [[id number]]     Order Status: No result Specimen: Stool     Culture, Anaerobe [[id number]] Collected: 09/25/24 1329    Order Status: Completed Specimen: Ankle, Right Updated: 09/27/24 0927     Anaerobic Culture Culture in progress    Narrative:      1. Right ankle wound -culture    Culture, Anaerobe [[id number]] Collected: 09/25/24 1329    Order Status: Completed Specimen: Ankle, Right Updated: 09/27/24 0926     Anaerobic Culture Culture in progress    Narrative:      3.. Right ankle wound -culture    Culture, Anaerobe [[id number]] Collected: 09/25/24 1329    Order Status: Completed Specimen: Ankle, Right Updated: 09/27/24 0924     Anaerobic Culture Culture in progress    Narrative:      2. . Right ankle wound -culture    Urine culture [[id number]]  (Abnormal)  (Susceptibility) Collected: 09/25/24 0028    Order Status: Completed Specimen: Urine Updated: 09/27/24 0459     Urine Culture, Routine ESCHERICHIA COLI  > 100,000 cfu/ml  No other  significant isolate      Narrative:      Specimen Source->Urine    Aerobic culture [[id number]] Collected: 09/25/24 1329    Order Status: Completed Specimen: Ankle, Right Updated: 09/26/24 0734     Aerobic Bacterial Culture No growth    Narrative:      1. Right ankle wound -culture    Aerobic culture [[id number]] Collected: 09/25/24 1329    Order Status: Completed Specimen: Ankle, Right Updated: 09/26/24 0734     Aerobic Bacterial Culture No growth    Narrative:      3.. Right ankle wound -culture    Fungus culture [[id number]] Collected: 09/25/24 1329    Order Status: Sent Specimen: Ankle, Right Updated: 09/25/24 1418            Significant Imaging: I have reviewed all pertinent imaging results/findings within the past 24 hours.

## 2024-09-28 NOTE — ASSESSMENT & PLAN NOTE
Present on admit. Prn immodium not improving diarrhea x 3 doses 9/27, reports at least 7-8 BMs a day right now on 9/28 and causing distress and inabliity to work with therapy.   Will try lomotil 9/28 eleanor and discsused with ID, who recs to pursue c diff testing. Charge and nusring notified and messaging UD for approval based on algorithms for testing

## 2024-09-28 NOTE — SUBJECTIVE & OBJECTIVE
Principal Problem:Wound dehiscence    Principal Orthopedic Problem: as above, s/p I&D right ankle 9/25    Interval History: Patient seen and examined at bedside. NAEON. VSS, afebrile. Pain controlled significantly improved this morning. She feels she can work with therapy today.  Hgb 9.5. Cultures NGTD.           Review of patient's allergies indicates:   Allergen Reactions    Novolin 70/30 (semi-synthetic) Nausea And Vomiting     Severe vomiting on Relion 70/30    Sulfa (sulfonamide antibiotics) Anaphylaxis    Talwin [pentazocine lactate] Anaphylaxis    Victoza [liraglutide] Nausea And Vomiting    Glipizide Nausea Only    Citric acid     Codeine     Influenza virus vaccines Hives    Iodine and iodide containing products Hives    Levetiracetam Itching    Neurontin [gabapentin]      Possible associated myoclonic jerk    Rituxan [rituximab] Hives    Zoloft [sertraline] Nausea And Vomiting       Current Facility-Administered Medications   Medication    0.9%  NaCl infusion (for blood administration)    albuterol-ipratropium 2.5 mg-0.5 mg/3 mL nebulizer solution 3 mL    aluminum-magnesium hydroxide-simethicone 200-200-20 mg/5 mL suspension 30 mL    aluminum-magnesium hydroxide-simethicone 200-200-20 mg/5 mL suspension 30 mL    apixaban tablet 2.5 mg    cefTRIAXone (Rocephin) 1 g in D5W 100 mL IVPB (MB+)    DAPTOmycin (CUBICIN) 685 mg in 0.9% NaCl SolP 50 mL IVPB    dextrose 10% bolus 125 mL 125 mL    dextrose 10% bolus 250 mL 250 mL    FLUoxetine capsule 40 mg    furosemide injection 40 mg    glucagon (human recombinant) injection 1 mg    glucose chewable tablet 16 g    glucose chewable tablet 24 g    hydrALAZINE tablet 75 mg    hydrOXYzine HCL tablet 25 mg    insulin aspart U-100 pen 0-5 Units    insulin glargine U-100 (Lantus) pen 16 Units    isosorbide mononitrate 24 hr tablet 60 mg    loperamide capsule 2 mg    melatonin tablet 6 mg    methocarbamoL tablet 500 mg    metoprolol succinate (TOPROL-XL) 24 hr tablet 25 mg  "   naloxone 0.4 mg/mL injection 0.02 mg    ondansetron tablet 4 mg    oxyCODONE immediate release tablet 5 mg    polyethylene glycol packet 17 g    prochlorperazine injection Soln 5 mg    QUEtiapine tablet 50 mg    simethicone chewable tablet 80 mg    sodium chloride 0.9% flush 5 mL    sucralfate 100 mg/mL suspension 1 g    ticagrelor tablet 90 mg     Objective:     Vital Signs (Most Recent):  Temp: 97.8 °F (36.6 °C) (09/28/24 0428)  Pulse: 81 (09/28/24 0428)  Resp: 16 (09/28/24 0428)  BP: (!) 150/64 (09/28/24 0526)  SpO2: 99 % (09/28/24 0428) Vital Signs (24h Range):  Temp:  [97.5 °F (36.4 °C)-98.8 °F (37.1 °C)] 97.8 °F (36.6 °C)  Pulse:  [62-94] 81  Resp:  [16-20] 16  SpO2:  [91 %-99 %] 99 %  BP: (137-176)/(61-73) 150/64     Weight: 85.4 kg (188 lb 4.4 oz)  Height: 5' 10" (177.8 cm)  Body mass index is 27.01 kg/m².      Intake/Output Summary (Last 24 hours) at 9/28/2024 0701  Last data filed at 9/28/2024 0632  Gross per 24 hour   Intake 450 ml   Output 1801 ml   Net -1351 ml        Ortho/SPM Exam  A&O x 3  Regular Rate  Non-Labored Respirations    RLE:  Wound vac with good seal  Ace wrap in place  Fires Quad/TA/EHL/GSC  SILT  Compartments soft  Brisk cap refill         Significant Labs: All pertinent labs within the past 24 hours have been reviewed.    Significant Imaging: I have reviewed all pertinent imaging results/findings.  "

## 2024-09-28 NOTE — ASSESSMENT & PLAN NOTE
Chronic, controlled. Latest blood pressure and vitals reviewed-     Temp:  [97.5 °F (36.4 °C)-98.8 °F (37.1 °C)]   Pulse:  [62-94]   Resp:  [16-20]   BP: (137-176)/(61-73)   SpO2:  [94 %-99 %] .   Home meds for hypertension were reviewed and noted below.   Hypertension Medications               isosorbide mononitrate (IMDUR) 60 MG 24 hr tablet Take 1 tablet (60 mg total) by mouth once daily.    metoprolol succinate (TOPROL-XL) 25 MG 24 hr tablet Take 1 tablet (25 mg total) by mouth once daily.            While in the hospital, will manage blood pressure as follows; Continue home antihypertensive regimen to treat in hospital.  -BP higher 9/26, had hydralazine added to isorbide/metoprolol last admit so start hydralazine now as BP 180s and she reporst pain minimal  -improving 9/27 but higher again 9/28 so will inc to 100    Will utilize p.r.n. blood pressure medication only if patient's blood pressure greater than 180/110 and she develops symptoms such as worsening chest pain or shortness of breath.

## 2024-09-28 NOTE — SUBJECTIVE & OBJECTIVE
Interval history: NAEO, WBC 8.80. On dapto/ceftriaxone. Right ankle wound vac with some pain. I&D cultures in process. Reports she has had 8 episodes of loose stool.     Review of Systems   Gastrointestinal:  Positive for diarrhea.   Musculoskeletal:  Positive for arthralgias.   Skin:  Positive for wound.   All other systems reviewed and are negative.    Objective:     Vital Signs (Most Recent):  Temp: 98.1 °F (36.7 °C) (09/28/24 1125)  Pulse: 74 (09/28/24 1441)  Resp: 18 (09/28/24 1125)  BP: (!) 175/71 (09/28/24 1125)  SpO2: 99 % (09/28/24 1404) Vital Signs (24h Range):  Temp:  [97.7 °F (36.5 °C)-98.3 °F (36.8 °C)] 98.1 °F (36.7 °C)  Pulse:  [62-94] 74  Resp:  [16-18] 18  SpO2:  [96 %-99 %] 99 %  BP: (137-175)/(61-71) 175/71     Weight: 85.4 kg (188 lb 4.4 oz)  Body mass index is 27.01 kg/m².    Estimated Creatinine Clearance: 37.2 mL/min (A) (based on SCr of 1.6 mg/dL (H)).     Physical Exam  Vitals and nursing note reviewed.   Constitutional:       General: She is not in acute distress.     Appearance: She is well-developed. She is not diaphoretic.   HENT:      Head: Normocephalic and atraumatic.      Right Ear: External ear normal.      Left Ear: External ear normal.      Nose: Nose normal.      Mouth/Throat:      Pharynx: No oropharyngeal exudate.   Eyes:      General: No scleral icterus.        Right eye: No discharge.         Left eye: No discharge.      Conjunctiva/sclera: Conjunctivae normal.      Pupils: Pupils are equal, round, and reactive to light.   Neck:      Thyroid: No thyromegaly.      Vascular: No JVD.      Trachea: No tracheal deviation.   Cardiovascular:      Rate and Rhythm: Normal rate and regular rhythm.      Heart sounds: No murmur heard.     No friction rub. No gallop.   Pulmonary:      Effort: Pulmonary effort is normal. No respiratory distress.      Breath sounds: Normal breath sounds. No stridor. No wheezing or rales.   Chest:      Chest wall: No tenderness.   Abdominal:      General:  Bowel sounds are normal. There is no distension.      Palpations: Abdomen is soft. There is no mass.      Tenderness: There is no abdominal tenderness. There is no guarding or rebound.   Musculoskeletal:         General: Tenderness (right lower extremity with dressings over it and a drain) present. Normal range of motion.      Cervical back: Normal range of motion and neck supple.   Lymphadenopathy:      Cervical: No cervical adenopathy.   Skin:     General: Skin is warm.   Neurological:      Mental Status: She is alert and oriented to person, place, and time.      Cranial Nerves: No cranial nerve deficit.      Motor: No abnormal muscle tone.      Coordination: Coordination normal.      Deep Tendon Reflexes: Reflexes normal.          Significant Labs:   Microbiology Results (last 7 days)       Procedure Component Value Units Date/Time    Aerobic culture [[id number]] Collected: 09/25/24 1329    Order Status: Completed Specimen: Ankle, Right Updated: 09/28/24 1158     Aerobic Bacterial Culture No growth    Narrative:      2. Right ankle wound -culture    Clostridium difficile EIA [[id number]]     Order Status: No result Specimen: Stool     Culture, Anaerobe [[id number]] Collected: 09/25/24 1329    Order Status: Completed Specimen: Ankle, Right Updated: 09/27/24 0927     Anaerobic Culture Culture in progress    Narrative:      1. Right ankle wound -culture    Culture, Anaerobe [[id number]] Collected: 09/25/24 1329    Order Status: Completed Specimen: Ankle, Right Updated: 09/27/24 0926     Anaerobic Culture Culture in progress    Narrative:      3.. Right ankle wound -culture    Culture, Anaerobe [[id number]] Collected: 09/25/24 1329    Order Status: Completed Specimen: Ankle, Right Updated: 09/27/24 0924     Anaerobic Culture Culture in progress    Narrative:      2. . Right ankle wound -culture    Urine culture [[id number]]  (Abnormal)  (Susceptibility) Collected: 09/25/24 0028    Order Status: Completed Specimen:  Urine Updated: 09/27/24 0459     Urine Culture, Routine ESCHERICHIA COLI  > 100,000 cfu/ml  No other significant isolate      Narrative:      Specimen Source->Urine    Aerobic culture [[id number]] Collected: 09/25/24 1329    Order Status: Completed Specimen: Ankle, Right Updated: 09/26/24 0734     Aerobic Bacterial Culture No growth    Narrative:      1. Right ankle wound -culture    Aerobic culture [[id number]] Collected: 09/25/24 1329    Order Status: Completed Specimen: Ankle, Right Updated: 09/26/24 0734     Aerobic Bacterial Culture No growth    Narrative:      3.. Right ankle wound -culture    Fungus culture [[id number]] Collected: 09/25/24 1329    Order Status: Sent Specimen: Ankle, Right Updated: 09/25/24 1418            Significant Imaging: I have reviewed all pertinent imaging results/findings within the past 24 hours.

## 2024-09-28 NOTE — ASSESSMENT & PLAN NOTE
Patient's most recent potassium results are listed below.   Recent Labs     09/26/24  0447 09/27/24  0400 09/28/24  0313   K 4.2 4.2 3.4*     Plan  - Replete potassium per protocol  - Monitor potassium Daily  - Patient's hypokalemia is stable  -

## 2024-09-28 NOTE — SUBJECTIVE & OBJECTIVE
"Interval History: she was in distress this morning as had excess diarrhea again and reports is "covered" in it and waiting for PCT to come assist her and clean her up. She is having some pain to rectal area from frequent stools so steroid cream added BID to help with inflammation and changed to eleanor lomotil now. She had 3 doses immodium yeserady with no improvement of diarrhea. She tolerated rocephin last admit for her UTI as well as vanc for her 2nd UTI and had constipation then and no loose stool on those meds so lower on list of suspicious causes as med induced. Messaged Dr Dhaliwal with ID to see if would recommend pursuing C diff testing and he does recommend to test for this given recent 30 day SNF stay. She doesn't know of anyone around her at SNF with bowel infection but is higher risk in facilities as happened the day of trasnfer here (which was 1 day out of SNF). Labs stable with K mildly low at 3.4 and replacing and Cr 1.6 and improving on IV lasix still as on low flowo xygen still with high 90s sats so as titrated down can likely transition back to oral lasix possible tomorrow vs Monday as approachign euvolemia. Hg improvd to 9.5 after 1 U yesterday and going back to [facility] today and ortho okayed to resume eliquis she was on for ppx after pelvic surgery last month. Will clarify end dates with them on this, given having extended NWB period on ankle, will either need to extend eliquis time or change to asa BID when out of the initial time they wanted her to be on eliquis (later October). On dapto/rocephin per ID recs now with no CX growth so far from ortho surgery. Charge nursing reaching out to  as need approval for c diff testing to confirm.    Review of Systems   Constitutional:  Negative for chills and fever.   Respiratory:  Negative for cough and shortness of breath.    Cardiovascular:  Negative for chest pain, palpitations and leg swelling.   Gastrointestinal:  Negative for abdominal pain, nausea " and vomiting.   Genitourinary:  Negative for difficulty urinating.   Musculoskeletal:  Negative for arthralgias, joint swelling and myalgias.   Skin:  Positive for wound (Right ankle surgical wound).   Neurological:  Positive for weakness (Generalized). Negative for dizziness and light-headedness.   Psychiatric/Behavioral:  Negative for confusion and hallucinations.      Objective:     Vital Signs (Most Recent):  Temp: 97.8 °F (36.6 °C) (09/25/24 0731)  Pulse: 97 (09/25/24 1122)  Resp: 18 (09/25/24 0731)  BP: 157/69 (09/25/24 0731)  SpO2: 93 % (09/25/24 0731) on room air Vital Signs (24h Range):  Temp:  [97.5 °F (36.4 °C)-98.8 °F (37.1 °C)] 97.7 °F (36.5 °C)  Pulse:  [62-94] 94  Resp:  [16-20] 18  SpO2:  [94 %-99 %] 96 %  BP: (137-176)/(61-73) 172/67     Weight: 85.4 kg (188 lb 4.4 oz)  Body mass index is 27.01 kg/m².    Intake/Output Summary (Last 24 hours) at 9/28/2024 1121  Last data filed at 9/28/2024 0632  Gross per 24 hour   Intake 450 ml   Output 1801 ml   Net -1351 ml         Physical Exam  Vitals and nursing note reviewed.   Constitutional:       General: She is awake. She is not in acute distress.     Appearance: Normal appearance. She is normal weight. She is not ill-appearing.      Comments: Patient in distress from loose stool. Patient awake and alert and oriented x 4.    Eyes:      Conjunctiva/sclera: Conjunctivae normal.   Cardiovascular:      Rate and Rhythm: Normal rate and regular rhythm.      Heart sounds: Normal heart sounds. No murmur heard.     No friction rub. No gallop.   Pulmonary:      Effort: Pulmonary effort is normal. No respiratory distress.      Breath sounds: Normal breath sounds. No wheezing.      Comments: On oxygen, approaching euvolemia  Abdominal:      General: Abdomen is flat. Bowel sounds are normal. There is no distension.      Palpations: Abdomen is soft.      Tenderness: There is no abdominal tenderness.   Musculoskeletal:      Right lower leg: No edema.      Left lower leg:  "No edema.      Comments: Right ankle wound vac   Skin:     General: Skin is warm.      Findings: No erythema.      Comments: Bluish coloration to groin areas related to application of Gentian violet   Neurological:      Mental Status: She is alert and oriented to person, place, and time.   Psychiatric:         Mood and Affect: Mood normal.         Behavior: Behavior normal. Behavior is cooperative.         Thought Content: Thought content normal.         Judgment: Judgment normal.             Significant Labs: A1C:   Recent Labs   Lab 07/10/24  1105   HGBA1C 8.2*     CBC:   Recent Labs   Lab 09/27/24  0400 09/28/24  0313   WBC 8.17 8.80   HGB 7.3* 9.5*   HCT 24.2* 29.9*    265     CMP:   Recent Labs   Lab 09/27/24 0400 09/28/24  0313    135*   K 4.2 3.4*    98   CO2 29 29   GLU 88 110   BUN 30* 26*   CREATININE 1.9* 1.6*   CALCIUM 8.5* 8.6*   PROT 5.8* 5.9*   ALBUMIN 2.1* 2.1*   BILITOT 0.3 0.4   ALKPHOS 342* 323*   AST 61* 50*   ALT 69* 53*   ANIONGAP 8 8     Cardiac Markers:   No results for input(s): "CKMB", "MYOGLOBIN", "BNP", "TROPISTAT" in the last 48 hours.      Magnesium:   Recent Labs   Lab 09/27/24  0400 09/28/24  0313   MG 1.9 2.0     POCT Glucose:   Recent Labs   Lab 09/27/24  1351 09/27/24  2052 09/28/24  0729   POCTGLUCOSE 147* 123* 97     Urine Studies:   No results for input(s): "COLORU", "APPEARANCEUA", "PHUR", "SPECGRAV", "PROTEINUA", "GLUCUA", "KETONESU", "BILIRUBINUA", "OCCULTUA", "NITRITE", "UROBILINOGEN", "LEUKOCYTESUR", "RBCUA", "WBCUA", "BACTERIA", "SQUAMEPITHEL", "HYALINECASTS" in the last 48 hours.    Invalid input(s): "WRIGHTSUR"      Significant Imaging: I have reviewed all pertinent imaging results/findings within the past 24 hours.  "

## 2024-09-28 NOTE — PROGRESS NOTES
[facility]  Orthopedics  Progress Note    Patient Name: Lorena Contreras  MRN: [id number]  Admission Date: 9/24/2024  Hospital Length of Stay: 3 days  Attending Provider: Rupal Ochoa MD  Primary Care Provider: Jorge Paris MD  Follow-up For: Procedure(s) (LRB):  IRRIGATION AND DEBRIDEMENT, LOWER EXTREMITY; slider table, supine, bone foam, cysto tubing, 6L NS/dakins/peroxide, culture swabs (Right)  APPLICATION, WOUND VAC (Right)  DEBRIDEMENT, LOCAL FLAP (Right)    Post-Operative Day: 3 Days Post-Op  Subjective:     Principal Problem:Wound dehiscence    Principal Orthopedic Problem: as above, s/p I&D right ankle 9/25    Interval History: Patient seen and examined at bedside. NAEON. VSS, afebrile. Pain controlled significantly improved this morning. She feels she can work with therapy today.  Hgb 9.5. Cultures NGTD.           Review of patient's allergies indicates:   Allergen Reactions    Novolin 70/30 (semi-synthetic) Nausea And Vomiting     Severe vomiting on Relion 70/30    Sulfa (sulfonamide antibiotics) Anaphylaxis    Talwin [pentazocine lactate] Anaphylaxis    Victoza [liraglutide] Nausea And Vomiting    Glipizide Nausea Only    Citric acid     Codeine     Influenza virus vaccines Hives    Iodine and iodide containing products Hives    Levetiracetam Itching    Neurontin [gabapentin]      Possible associated myoclonic jerk    Rituxan [rituximab] Hives    Zoloft [sertraline] Nausea And Vomiting       Current Facility-Administered Medications   Medication    0.9%  NaCl infusion (for blood administration)    albuterol-ipratropium 2.5 mg-0.5 mg/3 mL nebulizer solution 3 mL    aluminum-magnesium hydroxide-simethicone 200-200-20 mg/5 mL suspension 30 mL    aluminum-magnesium hydroxide-simethicone 200-200-20 mg/5 mL suspension 30 mL    apixaban tablet 2.5 mg    cefTRIAXone (Rocephin) 1 g in D5W 100 mL IVPB (MB+)    DAPTOmycin (CUBICIN) 685 mg in 0.9% NaCl SolP 50 mL IVPB    dextrose 10% bolus 125  "mL 125 mL    dextrose 10% bolus 250 mL 250 mL    FLUoxetine capsule 40 mg    furosemide injection 40 mg    glucagon (human recombinant) injection 1 mg    glucose chewable tablet 16 g    glucose chewable tablet 24 g    hydrALAZINE tablet 75 mg    hydrOXYzine HCL tablet 25 mg    insulin aspart U-100 pen 0-5 Units    insulin glargine U-100 (Lantus) pen 16 Units    isosorbide mononitrate 24 hr tablet 60 mg    loperamide capsule 2 mg    melatonin tablet 6 mg    methocarbamoL tablet 500 mg    metoprolol succinate (TOPROL-XL) 24 hr tablet 25 mg    naloxone 0.4 mg/mL injection 0.02 mg    ondansetron tablet 4 mg    oxyCODONE immediate release tablet 5 mg    polyethylene glycol packet 17 g    prochlorperazine injection Soln 5 mg    QUEtiapine tablet 50 mg    simethicone chewable tablet 80 mg    sodium chloride 0.9% flush 5 mL    sucralfate 100 mg/mL suspension 1 g    ticagrelor tablet 90 mg     Objective:     Vital Signs (Most Recent):  Temp: 97.8 °F (36.6 °C) (09/28/24 0428)  Pulse: 81 (09/28/24 0428)  Resp: 16 (09/28/24 0428)  BP: (!) 150/64 (09/28/24 0526)  SpO2: 99 % (09/28/24 0428) Vital Signs (24h Range):  Temp:  [97.5 °F (36.4 °C)-98.8 °F (37.1 °C)] 97.8 °F (36.6 °C)  Pulse:  [62-94] 81  Resp:  [16-20] 16  SpO2:  [91 %-99 %] 99 %  BP: (137-176)/(61-73) 150/64     Weight: 85.4 kg (188 lb 4.4 oz)  Height: 5' 10" (177.8 cm)  Body mass index is 27.01 kg/m².      Intake/Output Summary (Last 24 hours) at 9/28/2024 0701  Last data filed at 9/28/2024 0632  Gross per 24 hour   Intake 450 ml   Output 1801 ml   Net -1351 ml        Ortho/SPM Exam  A&O x 3  Regular Rate  Non-Labored Respirations    RLE:  Wound vac with good seal  Ace wrap in place  Fires Quad/TA/EHL/GSC  SILT  Compartments soft  Brisk cap refill         Significant Labs: All pertinent labs within the past 24 hours have been reviewed.    Significant Imaging: I have reviewed all pertinent imaging results/findings.  Assessment/Plan:     * Wound dehiscence, right " ankle  Lorena Contreras is a 73 y.o. female s/p ORIF right pilon fx presents with right medial ankle wound dehiscence.  Presented afebrile with labs significant for WBC 8.80, ESR  , CRP  .  On physical exam, medial ankle surgical wound noted to be open and draining serosanguinous fluid. Incision was irrigated with 4 L of normal saline. Sterile saline soaked gauze and soft dressings were applied to wound.  Patient's extremity was elevated.     S/p I&D R ankle 9/25/24.    Plan:  - Admit to    - Abx: vanc and rocephin  - ID consulted, appreciate recs  - Cultures NGTD  - NWB RLE x 10 weeks from date of ORIF (8/14/24)  - Prevena at 75mmHg, f/u 1 week for removal  - Kaycee: MARYBETH Marroquin MD  Orthopedics  [facility] - Surgery

## 2024-09-28 NOTE — NURSING
Nurses Note -- 4 Eyes      9/27/2024   9:58 PM      Skin assessed during: Daily Assessment      [] No Altered Skin Integrity Present    []Prevention Measures Documented      [x] Yes- Altered Skin Integrity Present or Discovered   [x] LDA Added if Not in Epic (Describe Wound)   [] New Altered Skin Integrity was Present on Admit and Documented in LDA   [] Wound Image Taken    Wound Care Consulted? Yes    Attending Nurse:  Geovanni BLANCO    Second RN/Staff Member:   Kaela VILLAREAL

## 2024-09-29 PROBLEM — A04.72 C. DIFFICILE COLITIS: Status: ACTIVE | Noted: 2024-09-29

## 2024-09-29 LAB
ALBUMIN SERPL BCP-MCNC: 2.3 G/DL (ref 3.5–5.2)
ALP SERPL-CCNC: 303 U/L (ref 55–135)
ALT SERPL W/O P-5'-P-CCNC: 42 U/L (ref 10–44)
ANION GAP SERPL CALC-SCNC: 11 MMOL/L (ref 8–16)
AST SERPL-CCNC: 42 U/L (ref 10–40)
BILIRUB SERPL-MCNC: 0.4 MG/DL (ref 0.1–1)
BUN SERPL-MCNC: 25 MG/DL (ref 8–23)
CALCIUM SERPL-MCNC: 8.7 MG/DL (ref 8.7–10.5)
CHLORIDE SERPL-SCNC: 94 MMOL/L (ref 95–110)
CO2 SERPL-SCNC: 30 MMOL/L (ref 23–29)
CREAT SERPL-MCNC: 1.5 MG/DL (ref 0.5–1.4)
ERYTHROCYTE [DISTWIDTH] IN BLOOD BY AUTOMATED COUNT: 16.2 % (ref 11.5–14.5)
EST. GFR  (NO RACE VARIABLE): 36.6 ML/MIN/1.73 M^2
GLUCOSE SERPL-MCNC: 140 MG/DL (ref 70–110)
GLUCOSE SERPL-MCNC: 158 MG/DL (ref 70–110)
HCT VFR BLD AUTO: 32.7 % (ref 37–48.5)
HGB BLD-MCNC: 10.2 G/DL (ref 12–16)
MAGNESIUM SERPL-MCNC: 2.2 MG/DL (ref 1.6–2.6)
MCH RBC QN AUTO: 28.9 PG (ref 27–31)
MCHC RBC AUTO-ENTMCNC: 31.2 G/DL (ref 32–36)
MCV RBC AUTO: 93 FL (ref 82–98)
PHOSPHATE SERPL-MCNC: 3.3 MG/DL (ref 2.7–4.5)
PLATELET # BLD AUTO: 311 K/UL (ref 150–450)
PMV BLD AUTO: 12.3 FL (ref 9.2–12.9)
POCT GLUCOSE: 140 MG/DL (ref 70–110)
POCT GLUCOSE: 149 MG/DL (ref 70–110)
POCT GLUCOSE: 188 MG/DL (ref 70–110)
POTASSIUM SERPL-SCNC: 3.4 MMOL/L (ref 3.5–5.1)
PROT SERPL-MCNC: 6.2 G/DL (ref 6–8.4)
RBC # BLD AUTO: 3.53 M/UL (ref 4–5.4)
SODIUM SERPL-SCNC: 135 MMOL/L (ref 136–145)
WBC # BLD AUTO: 12.96 K/UL (ref 3.9–12.7)

## 2024-09-29 PROCEDURE — 25000003 PHARM REV CODE 250: Mod: HCNC

## 2024-09-29 PROCEDURE — 83735 ASSAY OF MAGNESIUM: CPT | Mod: HCNC | Performed by: HOSPITALIST

## 2024-09-29 PROCEDURE — 25000003 PHARM REV CODE 250: Mod: HCNC | Performed by: INTERNAL MEDICINE

## 2024-09-29 PROCEDURE — 36415 COLL VENOUS BLD VENIPUNCTURE: CPT | Mod: HCNC | Performed by: HOSPITALIST

## 2024-09-29 PROCEDURE — 84100 ASSAY OF PHOSPHORUS: CPT | Mod: HCNC | Performed by: HOSPITALIST

## 2024-09-29 PROCEDURE — 25000003 PHARM REV CODE 250: Mod: HCNC | Performed by: NURSE PRACTITIONER

## 2024-09-29 PROCEDURE — 63600175 PHARM REV CODE 636 W HCPCS: Mod: HCNC | Performed by: INTERNAL MEDICINE

## 2024-09-29 PROCEDURE — 25000003 PHARM REV CODE 250: Mod: HCNC | Performed by: HOSPITALIST

## 2024-09-29 PROCEDURE — 99233 SBSQ HOSP IP/OBS HIGH 50: CPT | Mod: HCNC,,, | Performed by: INTERNAL MEDICINE

## 2024-09-29 PROCEDURE — 27000207 HC ISOLATION: Mod: HCNC

## 2024-09-29 PROCEDURE — 85027 COMPLETE CBC AUTOMATED: CPT | Mod: HCNC | Performed by: HOSPITALIST

## 2024-09-29 PROCEDURE — 21400001 HC TELEMETRY ROOM: Mod: HCNC

## 2024-09-29 PROCEDURE — 80053 COMPREHEN METABOLIC PANEL: CPT | Mod: HCNC | Performed by: HOSPITALIST

## 2024-09-29 RX ORDER — DIPHENOXYLATE HYDROCHLORIDE AND ATROPINE SULFATE 2.5; .025 MG/1; MG/1
1 TABLET ORAL 4 TIMES DAILY PRN
Status: DISCONTINUED | OUTPATIENT
Start: 2024-09-29 | End: 2024-09-29

## 2024-09-29 RX ORDER — POTASSIUM CHLORIDE 20 MEQ/1
40 TABLET, EXTENDED RELEASE ORAL ONCE
Status: COMPLETED | OUTPATIENT
Start: 2024-09-29 | End: 2024-09-29

## 2024-09-29 RX ADMIN — HYDROCORTISONE: 25 CREAM TOPICAL at 09:09

## 2024-09-29 RX ADMIN — ALUMINUM HYDROXIDE, MAGNESIUM HYDROXIDE, AND SIMETHICONE 30 ML: 1200; 120; 1200 SUSPENSION ORAL at 09:09

## 2024-09-29 RX ADMIN — ALUMINUM HYDROXIDE, MAGNESIUM HYDROXIDE, AND SIMETHICONE 30 ML: 1200; 120; 1200 SUSPENSION ORAL at 11:09

## 2024-09-29 RX ADMIN — FUROSEMIDE 40 MG: 10 INJECTION, SOLUTION INTRAVENOUS at 08:09

## 2024-09-29 RX ADMIN — APIXABAN 2.5 MG: 2.5 TABLET, FILM COATED ORAL at 08:09

## 2024-09-29 RX ADMIN — TICAGRELOR 90 MG: 90 TABLET ORAL at 08:09

## 2024-09-29 RX ADMIN — APIXABAN 2.5 MG: 2.5 TABLET, FILM COATED ORAL at 09:09

## 2024-09-29 RX ADMIN — CEFTRIAXONE SODIUM 2 G: 2 INJECTION, POWDER, FOR SOLUTION INTRAMUSCULAR; INTRAVENOUS at 06:09

## 2024-09-29 RX ADMIN — OXYCODONE HYDROCHLORIDE 5 MG: 5 TABLET ORAL at 08:09

## 2024-09-29 RX ADMIN — POTASSIUM CHLORIDE 40 MEQ: 1500 TABLET, EXTENDED RELEASE ORAL at 08:09

## 2024-09-29 RX ADMIN — QUETIAPINE FUMARATE 50 MG: 25 TABLET ORAL at 09:09

## 2024-09-29 RX ADMIN — Medication 6 MG: at 09:09

## 2024-09-29 RX ADMIN — HYDRALAZINE HYDROCHLORIDE 100 MG: 50 TABLET ORAL at 06:09

## 2024-09-29 RX ADMIN — HYDROXYZINE HYDROCHLORIDE 25 MG: 25 TABLET, FILM COATED ORAL at 09:09

## 2024-09-29 RX ADMIN — TICAGRELOR 90 MG: 90 TABLET ORAL at 09:09

## 2024-09-29 RX ADMIN — ONDANSETRON HYDROCHLORIDE 4 MG: 4 TABLET, FILM COATED ORAL at 03:09

## 2024-09-29 RX ADMIN — HYDROCORTISONE: 25 CREAM TOPICAL at 08:09

## 2024-09-29 RX ADMIN — ALUMINUM HYDROXIDE, MAGNESIUM HYDROXIDE, AND SIMETHICONE 30 ML: 1200; 120; 1200 SUSPENSION ORAL at 06:09

## 2024-09-29 RX ADMIN — HYDRALAZINE HYDROCHLORIDE 100 MG: 50 TABLET ORAL at 09:09

## 2024-09-29 RX ADMIN — METHOCARBAMOL 500 MG: 500 TABLET ORAL at 08:09

## 2024-09-29 RX ADMIN — HYDROXYZINE HYDROCHLORIDE 25 MG: 25 TABLET, FILM COATED ORAL at 02:09

## 2024-09-29 RX ADMIN — VANCOMYCIN HYDROCHLORIDE 125 MG: KIT at 06:09

## 2024-09-29 RX ADMIN — ISOSORBIDE MONONITRATE 60 MG: 30 TABLET, EXTENDED RELEASE ORAL at 08:09

## 2024-09-29 RX ADMIN — SUCRALFATE 1 G: 1 SUSPENSION ORAL at 06:09

## 2024-09-29 RX ADMIN — METHOCARBAMOL 500 MG: 500 TABLET ORAL at 04:09

## 2024-09-29 RX ADMIN — METOPROLOL SUCCINATE 25 MG: 25 TABLET, EXTENDED RELEASE ORAL at 08:09

## 2024-09-29 RX ADMIN — VANCOMYCIN HYDROCHLORIDE 125 MG: KIT at 01:09

## 2024-09-29 RX ADMIN — DAPTOMYCIN 685 MG: 350 INJECTION, POWDER, LYOPHILIZED, FOR SOLUTION INTRAVENOUS at 08:09

## 2024-09-29 RX ADMIN — ALUMINUM HYDROXIDE, MAGNESIUM HYDROXIDE, AND SIMETHICONE 30 ML: 200; 200; 20 SUSPENSION ORAL at 06:09

## 2024-09-29 RX ADMIN — METHOCARBAMOL 500 MG: 500 TABLET ORAL at 01:09

## 2024-09-29 RX ADMIN — VANCOMYCIN HYDROCHLORIDE 125 MG: KIT at 11:09

## 2024-09-29 RX ADMIN — FUROSEMIDE 40 MG: 10 INJECTION, SOLUTION INTRAVENOUS at 09:09

## 2024-09-29 RX ADMIN — FLUOXETINE HYDROCHLORIDE 40 MG: 20 CAPSULE ORAL at 08:09

## 2024-09-29 RX ADMIN — INSULIN GLARGINE 16 UNITS: 100 INJECTION, SOLUTION SUBCUTANEOUS at 09:09

## 2024-09-29 RX ADMIN — HYDRALAZINE HYDROCHLORIDE 100 MG: 50 TABLET ORAL at 01:09

## 2024-09-29 RX ADMIN — METHOCARBAMOL 500 MG: 500 TABLET ORAL at 09:09

## 2024-09-29 RX ADMIN — SUCRALFATE 1 G: 1 SUSPENSION ORAL at 11:09

## 2024-09-29 RX ADMIN — SUCRALFATE 1 G: 1 SUSPENSION ORAL at 12:09

## 2024-09-29 RX ADMIN — ALUMINUM HYDROXIDE, MAGNESIUM HYDROXIDE, AND SIMETHICONE 30 ML: 1200; 120; 1200 SUSPENSION ORAL at 04:09

## 2024-09-29 NOTE — ASSESSMENT & PLAN NOTE
Present on admit. Prn immodium not improving diarrhea x 3 doses 9/27, reports at least 7-8 BMs a day right now on 9/28 and causing distress and inabliity to work with therapy.   Will try lomotil 9/28 eleanor and discsused with ID, who recs to pursue c diff testing. Charge and nusring notified and messaging UD for approval based on algorithms for testing  -C diff + on 9/28 PM and stop lomotil and PO vanc started.

## 2024-09-29 NOTE — NURSING
Pt arrived to floor via stretcher with transport and transferred to bed. SCDs applied, oriented to room, call light placed within reach, bed low and locked, and family at bedside. Pt complains of pain 3/10.Incisions noted to abdomen ,4 lap sites , CDI. No acute distress noted at this time.

## 2024-09-29 NOTE — PLAN OF CARE
Pt x4, VSS. Pt's C.Diff came  back positive and MD Tucker informed. PO vancomycin has been scheduled for 0600. PT's wound vac also came lose, I reinforced it per orders and it is currently working properly. PT currently resting, pt care ongoing.  Problem: Adult Inpatient Plan of Care  Goal: Plan of Care Review  Outcome: Progressing  Goal: Patient-Specific Goal (Individualized)  Outcome: Progressing  Goal: Absence of Hospital-Acquired Illness or Injury  Outcome: Progressing  Goal: Optimal Comfort and Wellbeing  Outcome: Progressing

## 2024-09-29 NOTE — ASSESSMENT & PLAN NOTE
+ after copious diarrhea on 9/28 and oral vanc started, lomotil stpped due to toxic megacolon risk with anti-diarrhea meds  -based on history, was present on admit as the diarrehea started the day she was admitted, and was just at SNF the last month and exposure appears to be from there.

## 2024-09-29 NOTE — SUBJECTIVE & OBJECTIVE
Interval History: continues to have diarrhea.    Review of Systems   Gastrointestinal:  Positive for diarrhea.   Musculoskeletal:  Positive for arthralgias.   Skin:  Positive for wound.   All other systems reviewed and are negative.    Objective:     Vital Signs (Most Recent):  Temp: 99 °F (37.2 °C) (09/29/24 1437)  Pulse: 101 (09/29/24 1437)  Resp: 18 (09/29/24 1437)  BP: (!) 121/49 (09/29/24 1437)  SpO2: 98 % (09/29/24 1437) Vital Signs (24h Range):  Temp:  [98 °F (36.7 °C)-99 °F (37.2 °C)] 99 °F (37.2 °C)  Pulse:  [] 101  Resp:  [16-18] 18  SpO2:  [93 %-99 %] 98 %  BP: (121-158)/(49-64) 121/49     Weight: 85.4 kg (188 lb 4.4 oz)  Body mass index is 27.01 kg/m².    Estimated Creatinine Clearance: 39.7 mL/min (A) (based on SCr of 1.5 mg/dL (H)).     Physical Exam  Vitals and nursing note reviewed.   Constitutional:       General: She is not in acute distress.     Appearance: She is well-developed. She is not diaphoretic.   HENT:      Head: Normocephalic and atraumatic.      Right Ear: External ear normal.      Left Ear: External ear normal.      Nose: Nose normal.      Mouth/Throat:      Pharynx: No oropharyngeal exudate.   Eyes:      General: No scleral icterus.        Right eye: No discharge.         Left eye: No discharge.      Conjunctiva/sclera: Conjunctivae normal.      Pupils: Pupils are equal, round, and reactive to light.   Neck:      Thyroid: No thyromegaly.      Vascular: No JVD.      Trachea: No tracheal deviation.   Cardiovascular:      Rate and Rhythm: Normal rate and regular rhythm.      Heart sounds: No murmur heard.     No friction rub. No gallop.   Pulmonary:      Effort: Pulmonary effort is normal. No respiratory distress.      Breath sounds: Normal breath sounds. No stridor. No wheezing or rales.   Chest:      Chest wall: No tenderness.   Abdominal:      General: Bowel sounds are normal. There is no distension.      Palpations: Abdomen is soft. There is no mass.      Tenderness: There is  no abdominal tenderness. There is no guarding or rebound.   Musculoskeletal:         General: Tenderness (right lower extremity with dressings over it and a drain) present. Normal range of motion.      Cervical back: Normal range of motion and neck supple.   Lymphadenopathy:      Cervical: No cervical adenopathy.   Skin:     General: Skin is warm.   Neurological:      Mental Status: She is alert and oriented to person, place, and time.      Cranial Nerves: No cranial nerve deficit.      Motor: No abnormal muscle tone.      Coordination: Coordination normal.      Deep Tendon Reflexes: Reflexes normal.          Significant Labs:   Microbiology Results (last 7 days)       Procedure Component Value Units Date/Time    Clostridium difficile EIA [[id number]]  (Abnormal) Collected: 09/28/24 1556    Order Status: Completed Specimen: Stool Updated: 09/28/24 2318     C. diff Antigen Positive     C difficile Toxins A+B, EIA Positive     Comment: Testing not recommended for children <24 months old.       Narrative:      Approved by dr Shelby following case    Aerobic culture [[id number]] Collected: 09/25/24 1329    Order Status: Completed Specimen: Ankle, Right Updated: 09/28/24 1158     Aerobic Bacterial Culture No growth    Narrative:      2. Right ankle wound -culture    Culture, Anaerobe [[id number]] Collected: 09/25/24 1329    Order Status: Completed Specimen: Ankle, Right Updated: 09/27/24 0927     Anaerobic Culture Culture in progress    Narrative:      1. Right ankle wound -culture    Culture, Anaerobe [[id number]] Collected: 09/25/24 1329    Order Status: Completed Specimen: Ankle, Right Updated: 09/27/24 0926     Anaerobic Culture Culture in progress    Narrative:      3.. Right ankle wound -culture    Culture, Anaerobe [[id number]] Collected: 09/25/24 1329    Order Status: Completed Specimen: Ankle, Right Updated: 09/27/24 0924     Anaerobic Culture Culture in progress    Narrative:      2. . Right ankle wound  -culture    Urine culture [[id number]]  (Abnormal)  (Susceptibility) Collected: 09/25/24 0028    Order Status: Completed Specimen: Urine Updated: 09/27/24 0459     Urine Culture, Routine ESCHERICHIA COLI  > 100,000 cfu/ml  No other significant isolate      Narrative:      Specimen Source->Urine    Aerobic culture [[id number]] Collected: 09/25/24 1329    Order Status: Completed Specimen: Ankle, Right Updated: 09/26/24 0734     Aerobic Bacterial Culture No growth    Narrative:      1. Right ankle wound -culture    Aerobic culture [[id number]] Collected: 09/25/24 1329    Order Status: Completed Specimen: Ankle, Right Updated: 09/26/24 0734     Aerobic Bacterial Culture No growth    Narrative:      3.. Right ankle wound -culture    Fungus culture [[id number]] Collected: 09/25/24 1329    Order Status: Sent Specimen: Ankle, Right Updated: 09/25/24 1418            Significant Imaging: I have reviewed all pertinent imaging results/findings within the past 24 hours.

## 2024-09-29 NOTE — PROGRESS NOTES
[facility] - [facility]  Infectious Disease  Progress Note    Patient Name: Lorena Contreras  MRN: [id number]  Admission Date: 9/24/2024  Length of Stay: 4 days  Attending Physician: Rupal Ochoa MD  Primary Care Provider: Jorge Paris MD    Isolation Status: Special Contact  Assessment/Plan:      ID  C. difficile colitis  Oral vancomycin 125 mg po q 6 hours for 10 days.  Then patient should continue on oral vancomycin 125 mg bid for the duration of the time that she is on antibiotics for her right ankle infection.    Orthopedic  * Wound dehiscence, right ankle  73 year old female with a history of right pilon fracture s/p ORIF on 8/14/24.  She is now admitted with wound dehiscence with exposure of the hardware to the environment.  She underwent I&D on 9/25/24.  Surgical cultures are still negative to date.    Plan  Continue Daptomycin IV  Re-start IV ceftriaxone.  Recommend 6 weeks of antibiotics.  Re-consult ID if surgery decides to remove the hardware for an updated treatment plan.        Anticipated Disposition: TBD    Thank you for your consult. I will sign off. Please contact us if you have any additional questions.    William Dhaliwal MD  Infectious Disease  [facility] - Surgery    Subjective:     Principal Problem:Wound dehiscence    HPI:  Lorena Contreras is a 73 year old with poorly controlled DM (A1c 8.2), HTN, CAD s/p PCIs, CKD3, HFpEF, history of CVA, history of lymphoma s/p chemo, and anemia, who is s/p ORIF of right pilon fracture (8/14) and ORIF right acetabulum fracture (8/16) after a fall, c/b urinary retention requiring indwelling chong.  Initially d/c to SNF, then d/c to home four days prior to admission. On 9/22 she bumped the ankle while getting into the car and the wound opened.  She presented to ED on 9/23 and is now s/p I&D on 9/25.  ID consulted for antibiotic recommendations.          Interval History: continues to have diarrhea.    Review of Systems   Gastrointestinal:  Positive  for diarrhea.   Musculoskeletal:  Positive for arthralgias.   Skin:  Positive for wound.   All other systems reviewed and are negative.    Objective:     Vital Signs (Most Recent):  Temp: 99 °F (37.2 °C) (09/29/24 1437)  Pulse: 101 (09/29/24 1437)  Resp: 18 (09/29/24 1437)  BP: (!) 121/49 (09/29/24 1437)  SpO2: 98 % (09/29/24 1437) Vital Signs (24h Range):  Temp:  [98 °F (36.7 °C)-99 °F (37.2 °C)] 99 °F (37.2 °C)  Pulse:  [] 101  Resp:  [16-18] 18  SpO2:  [93 %-99 %] 98 %  BP: (121-158)/(49-64) 121/49     Weight: 85.4 kg (188 lb 4.4 oz)  Body mass index is 27.01 kg/m².    Estimated Creatinine Clearance: 39.7 mL/min (A) (based on SCr of 1.5 mg/dL (H)).     Physical Exam  Vitals and nursing note reviewed.   Constitutional:       General: She is not in acute distress.     Appearance: She is well-developed. She is not diaphoretic.   HENT:      Head: Normocephalic and atraumatic.      Right Ear: External ear normal.      Left Ear: External ear normal.      Nose: Nose normal.      Mouth/Throat:      Pharynx: No oropharyngeal exudate.   Eyes:      General: No scleral icterus.        Right eye: No discharge.         Left eye: No discharge.      Conjunctiva/sclera: Conjunctivae normal.      Pupils: Pupils are equal, round, and reactive to light.   Neck:      Thyroid: No thyromegaly.      Vascular: No JVD.      Trachea: No tracheal deviation.   Cardiovascular:      Rate and Rhythm: Normal rate and regular rhythm.      Heart sounds: No murmur heard.     No friction rub. No gallop.   Pulmonary:      Effort: Pulmonary effort is normal. No respiratory distress.      Breath sounds: Normal breath sounds. No stridor. No wheezing or rales.   Chest:      Chest wall: No tenderness.   Abdominal:      General: Bowel sounds are normal. There is no distension.      Palpations: Abdomen is soft. There is no mass.      Tenderness: There is no abdominal tenderness. There is no guarding or rebound.   Musculoskeletal:         General:  Tenderness (right lower extremity with dressings over it and a drain) present. Normal range of motion.      Cervical back: Normal range of motion and neck supple.   Lymphadenopathy:      Cervical: No cervical adenopathy.   Skin:     General: Skin is warm.   Neurological:      Mental Status: She is alert and oriented to person, place, and time.      Cranial Nerves: No cranial nerve deficit.      Motor: No abnormal muscle tone.      Coordination: Coordination normal.      Deep Tendon Reflexes: Reflexes normal.          Significant Labs:   Microbiology Results (last 7 days)       Procedure Component Value Units Date/Time    Clostridium difficile EIA [[id number]]  (Abnormal) Collected: 09/28/24 1556    Order Status: Completed Specimen: Stool Updated: 09/28/24 2318     C. diff Antigen Positive     C difficile Toxins A+B, EIA Positive     Comment: Testing not recommended for children <24 months old.       Narrative:      Approved by dr Shelby following case    Aerobic culture [[id number]] Collected: 09/25/24 1329    Order Status: Completed Specimen: Ankle, Right Updated: 09/28/24 1158     Aerobic Bacterial Culture No growth    Narrative:      2. Right ankle wound -culture    Culture, Anaerobe [[id number]] Collected: 09/25/24 1329    Order Status: Completed Specimen: Ankle, Right Updated: 09/27/24 0927     Anaerobic Culture Culture in progress    Narrative:      1. Right ankle wound -culture    Culture, Anaerobe [[id number]] Collected: 09/25/24 1329    Order Status: Completed Specimen: Ankle, Right Updated: 09/27/24 0926     Anaerobic Culture Culture in progress    Narrative:      3.. Right ankle wound -culture    Culture, Anaerobe [[id number]] Collected: 09/25/24 1329    Order Status: Completed Specimen: Ankle, Right Updated: 09/27/24 0924     Anaerobic Culture Culture in progress    Narrative:      2. . Right ankle wound -culture    Urine culture [[id number]]  (Abnormal)  (Susceptibility) Collected: 09/25/24 0028     Order Status: Completed Specimen: Urine Updated: 09/27/24 0459     Urine Culture, Routine ESCHERICHIA COLI  > 100,000 cfu/ml  No other significant isolate      Narrative:      Specimen Source->Urine    Aerobic culture [[id number]] Collected: 09/25/24 1329    Order Status: Completed Specimen: Ankle, Right Updated: 09/26/24 0734     Aerobic Bacterial Culture No growth    Narrative:      1. Right ankle wound -culture    Aerobic culture [[id number]] Collected: 09/25/24 1329    Order Status: Completed Specimen: Ankle, Right Updated: 09/26/24 0734     Aerobic Bacterial Culture No growth    Narrative:      3.. Right ankle wound -culture    Fungus culture [[id number]] Collected: 09/25/24 1329    Order Status: Sent Specimen: Ankle, Right Updated: 09/25/24 1418            Significant Imaging: I have reviewed all pertinent imaging results/findings within the past 24 hours.

## 2024-09-29 NOTE — ASSESSMENT & PLAN NOTE
Patient is identified as having Diastolic (HFpEF) heart failure that is Acute on chronic. CHF is currently uncontrolled due to Pulmonary edema/pleural effusion on CXR. CXR on admit with mild pulmonary congestion and BNP elevated at 1373 on admit. Patient with mild excerebration. Latest ECHO performed and demonstrates- Results for orders placed during the hospital encounter of 11/03/23    Echo    Interpretation Summary    Left Ventricle: The left ventricle is normal in size. Increased wall thickness. Moderate septal thickening. Normal wall motion. There is normal systolic function with a visually estimated ejection fraction of 65 - 70%. Grade I diastolic dysfunction.    Right Ventricle: Normal right ventricular cavity size. Systolic function is normal.    Left Atrium: Left atrium is severely dilated.    Aortic Valve: There is moderate stenosis. Aortic valve area by VTI is 1.02 cm². Aortic valve peak velocity is 2.59 m/s. Mean gradient is 18 mmHg. The dimensionless index is 0.32.    Mitral Valve: There is mild regurgitation.    Tricuspid Valve: There is mild regurgitation.    Pulmonary Artery: The estimated pulmonary artery systolic pressure is 35 mmHg.    IVC/SVC: Normal venous pressure at 3 mmHg.    - started on IV Lasix 40 mg BID to treat mild heart failure. Excerebration very mild so okay to proceed with surgery as patient not hypoxic and not having any respiratory distress. Watch given mild cr high limit normal on admit but improving 9/26.  - Continue home Imdur and Toprol XL for chronic heart failure and monitor clinical status closely. Monitor on telemetry.   - Patient is off CHF pathway.    - Monitor strict Is&Os and daily weights.    - Place on fluid restriction of 1.5 L. Cardiology has not been consulted.   - Continue to stress to patient importance of self efficacy and  on diet for CHF.   - Last BNP reviewed- and noted below   Recent Labs   Lab 09/25/24  0248   BNP 1,373*     Still on oxygen 927 with  signs of mild overload so keep on IV for now and off oxygen 9/29 but reports some subjective dyspnea at rest and when laying flat still so continue and reassess tomorrow for oral conversion

## 2024-09-29 NOTE — PROGRESS NOTES
"[facility] Medicine  Progress Note    Patient Name: Lorena Contreras  MRN: [id number]  Patient Class: IP- Inpatient   Admission Date: 9/24/2024  Length of Stay: 4 days  Attending Physician: Rupal Ochoa MD  Primary Care Provider: Jorge Paris MD        Subjective:     Principal Problem:Wound dehiscence        HPI:  Lorena Contreras is a 72 yo F with PMHx of  DM2, Fibromyalgia, lymphoma s/p chemo, HTN, HLD, CVA, MI, CAD s/p PCIs, CKD3b, HFpEF, and anemia who presented to ED for R ankle wound dehiscence. The patient reports that on 8/14/24, she was walking into a [employer] when she lost her balance, fell, and twisted her ankle. She sustained a pelvic fracture and a right pilon fracture, requiring surgery at [facility] later that day. Following the fall, she experienced urinary retention and had an indwelling catheter placed. While at the SNF, the patient reports significant progress, noting that her injuries were healing well, and she regained some mobility. Her wound was evaluated, and sutures were removed on 9/12/24. The patient was discharged home four days ago but describes her experience as "miserable," citing a lack of mobility aids provided for home use. At SNF, she had not been bearing weight on her RLE. She reports while attempting to stand on her RLE with home health her injury opened up. Since then, she has had increased swelling to her R ankle and associated generalized weakness and fatigue, decreased appetite, constipation, SOB (when anxious) and nausea. She denies fevers, chills, rhinorrhea, sore throat, cough, hemoptysis, chest pain, vomiting, diarrhea, or blood in her stool.     ED Course: AFVSS. CBC significant hgb 7.9 (down from 11.8 on month ago). No leukocytosis. CMP with Cr 2.0 (at baseline), transaminitis , , and . BG initially 232, but now 91. CRP 93 and ESR 49. Lactate 0.91. R ankle XR with stable hardware and no acute fx or dislocation, but " concerning for cellulitis. Ortho consulted and planning for I&D in AM. Given IV zofran. Placed in observation with HM.       Overview/Hospital Course:  Orthopedics consulted and patient being taken to OR today for incision and drainage and washout of right ankle surgical site for wound dehiscence. Patient with no obvious clinical signs of infection.     Interval History: she reports stools still loose as just starting c diff treatment as + test overnight when stool sent after discussing with ID who agreed that needed testing. She reports that it started the day she was admitted so suspect she was exposed to c diff at SNF she was at in the last month given it was basically present the day of admit she said. Stop lomotil with + c diff and oral vanc started by night team once + result and on precautions now. She repots her daughter had a feeling it was ac diff as a relative of theirs had it a few years ago and it reminded her of that. Cr 1.5. she reports still some dyspnea laying flat, off oxygen this AM so approaching euvolemic with IV lasix still on but suspect may be able to titrate off tomorrow, just awaiting improved subnective dyspnea now. Hg stable on labs, no culture growth so far and on daptomycin per ID recs and rocephin stopped yesterday as they talked to dr patino with ortho who reports hardware was exposed which is concern for coverage given this finding intra-op. She feels somewhat depressed with all the medical things going on but I reassured her that her labs are looking better, glucose is looking good,w e have an answer for her diarrhea so should be on the right track soon with this and should be talking about discharge early this week as well as therapy stating she had a better session with them yesterday. Her sister is coming today to watch the [employer] game with her.      Review of Systems   Constitutional:  Negative for chills and fever.   Respiratory:  Negative for cough and shortness of breath.     Cardiovascular:  Negative for chest pain, palpitations and leg swelling.   Gastrointestinal:  Negative for abdominal pain, nausea and vomiting.   Genitourinary:  Negative for difficulty urinating.   Musculoskeletal:  Negative for arthralgias, joint swelling and myalgias.   Skin:  Positive for wound (Right ankle surgical wound).   Neurological:  Positive for weakness (Generalized). Negative for dizziness and light-headedness.   Psychiatric/Behavioral:  Negative for confusion and hallucinations.      Objective:     Vital Signs (Most Recent):  Temp: 97.8 °F (36.6 °C) (09/25/24 0731)  Pulse: 97 (09/25/24 1122)  Resp: 18 (09/25/24 0731)  BP: 157/69 (09/25/24 0731)  SpO2: 93 % (09/25/24 0731) on room air Vital Signs (24h Range):  Temp:  [98 °F (36.7 °C)-98.7 °F (37.1 °C)] 98.3 °F (36.8 °C)  Pulse:  [62-94] 81  Resp:  [16-18] 17  SpO2:  [95 %-99 %] 95 %  BP: (124-175)/(56-71) 142/62     Weight: 85.4 kg (188 lb 4.4 oz)  Body mass index is 27.01 kg/m².    Intake/Output Summary (Last 24 hours) at 9/29/2024 1115  Last data filed at 9/28/2024 2300  Gross per 24 hour   Intake 119 ml   Output 400 ml   Net -281 ml         Physical Exam  Vitals and nursing note reviewed.   Constitutional:       General: She is awake. She is not in acute distress.     Appearance: Normal appearance. She is normal weight. She is not ill-appearing.      Comments: . Patient awake and alert and oriented x 4.    Eyes:      Conjunctiva/sclera: Conjunctivae normal.   Cardiovascular:      Rate and Rhythm: Normal rate and regular rhythm.      Heart sounds: Normal heart sounds. No murmur heard.     No friction rub. No gallop.   Pulmonary:      Effort: Pulmonary effort is normal. No respiratory distress.      Breath sounds: Normal breath sounds. No wheezing.      Comments: On room air but reports some subejctive dyspnea at rest and when laying flat. approaching euvolemia  Abdominal:      General: Abdomen is flat. Bowel sounds are normal. There is no  "distension.      Palpations: Abdomen is soft.      Tenderness: There is no abdominal tenderness.   Musculoskeletal:      Right lower leg: No edema.      Left lower leg: No edema.      Comments: Right ankle wound vac   Skin:     General: Skin is warm.      Findings: No erythema.      Comments: Bluish coloration to groin areas related to application of Gentian violet   Neurological:      Mental Status: She is alert and oriented to person, place, and time.   Psychiatric:         Mood and Affect: Mood normal.         Behavior: Behavior normal. Behavior is cooperative.         Thought Content: Thought content normal.         Judgment: Judgment normal.             Significant Labs: A1C:   Recent Labs   Lab 07/10/24  1105   HGBA1C 8.2*     CBC:   Recent Labs   Lab 09/28/24 0313 09/29/24  0353   WBC 8.80 12.96*   HGB 9.5* 10.2*   HCT 29.9* 32.7*    311     CMP:   Recent Labs   Lab 09/28/24 0313 09/29/24  0353   * 135*   K 3.4* 3.4*   CL 98 94*   CO2 29 30*    140*   BUN 26* 25*   CREATININE 1.6* 1.5*   CALCIUM 8.6* 8.7   PROT 5.9* 6.2   ALBUMIN 2.1* 2.3*   BILITOT 0.4 0.4   ALKPHOS 323* 303*   AST 50* 42*   ALT 53* 42   ANIONGAP 8 11     Cardiac Markers:   No results for input(s): "CKMB", "MYOGLOBIN", "BNP", "TROPISTAT" in the last 48 hours.      Magnesium:   Recent Labs   Lab 09/28/24 0313 09/29/24  0353   MG 2.0 2.2     POCT Glucose:   Recent Labs   Lab 09/28/24  1621 09/28/24  2059 09/29/24  0746   POCTGLUCOSE 117* 163* 140*     Urine Studies:   No results for input(s): "COLORU", "APPEARANCEUA", "PHUR", "SPECGRAV", "PROTEINUA", "GLUCUA", "KETONESU", "BILIRUBINUA", "OCCULTUA", "NITRITE", "UROBILINOGEN", "LEUKOCYTESUR", "RBCUA", "WBCUA", "BACTERIA", "SQUAMEPITHEL", "HYALINECASTS" in the last 48 hours.    Invalid input(s): "WRIGHTSUR"      Significant Imaging: I have reviewed all pertinent imaging results/findings within the past 24 hours.    Assessment/Plan:      * Wound dehiscence, right " ankle  Closed right pilon fracture s/p ORIF on 8/14/2024  72 yo female with history of a fall resulting in a pilon fracture to the right ankle on 8/14/24 s/p ORIF. Sutures removed on 09/12/2024 in Ortho clinic. Presented to hospital for swelling and wound dehiscence to right ankle. No leukocytosis. CRP 9.3 and ESR 49. X-ray of right ankle on admit showed Orthopedic hardware intact with no acute fracture or soft tissue swelling.    - Orthopedics consulted in ED:       - Wound irrigated and soft dressings applied in ED.  - Patient non weight bearing to right lower extremity as per Ortho.   - Multimodal pain regimen. Avoid Tylenol as AST and ALT elevated. Oxycodone IR po prn for breakthrough pain.   - Plan for irrigation and debridement of right ankle wound in OR today (9/25/24) with Dr. Davalos and Orthopedics with vac placed and full op note still pending from 9/25.  -vanc/rocephin for coverage with serosanginous drainage reported and ortho note reports ID consulted but they were not so consulted 9/27 given ortho feels concern for infectious given this with hardware exposed. ID changed to dapto/rocephin on 9/28 and appreciate assistance. Rocephin stopped 9/28 per ID  -NWB until at least 11/1 per ortho-   -prevena wound vac for a week  -was on eliquis until oct 26th post pelvic/ankel fx for ppx, okay to resume per ortho and resumed 9/28  Rec for SNF but she declines as out of days and misses her cats and had bad experience. Family aware       C. difficile colitis  + after copious diarrhea on 9/28 and oral vanc started, lomotil stpped due to toxic megacolon risk with anti-diarrhea meds  -based on history, was present on admit as the diarrehea started the day she was admitted, and was just at SNF the last month and exposure appears to be from there.      Hypokalemia  Patient's most recent potassium results are listed below.   Recent Labs     09/26/24  0447 09/27/24  0400 09/28/24  0313   K 4.2 4.2 3.4*     Plan  -  Replete potassium per protocol  - Monitor potassium Daily  - Patient's hypokalemia is stable  -     Diarrhea  Present on admit. Prn immodium not improving diarrhea x 3 doses 9/27, reports at least 7-8 BMs a day right now on 9/28 and causing distress and inabliity to work with therapy.   Will try lomotil 9/28 eleanor and discsused with ID, who recs to pursue c diff testing. Charge and nusring notified and messaging UD for approval based on algorithms for testing  -C diff + on 9/28 PM and stop lomotil and PO vanc started.      Acute blood loss anemia  Anemia is likely due to acute blood loss which was from fx and surgery . Most recent hemoglobin and hematocrit are listed below.  Recent Labs     09/26/24  0447 09/27/24  0400 09/28/24  0313   HGB 7.3* 7.3* 9.5*   HCT 23.5* 24.2* 29.9*       Plan  - Monitor serial CBC: Daily  - Transfuse PRBC if patient becomes hemodynamically unstable, symptomatic or H/H drops below 7/21. And will tx 1 U on 9/27 given hovering at 7.3 now and improved after tx to 9.5  - Patient has not received any PRBC transfusions to date  - Patient's anemia is currently stable      Closed right pilon fracture  - see wound dehiscence above     Transaminitis  - Patient on admit labs with , , and  and normal T. Jc.  - Patient denies abdominal pain or vomiting.  - Patient on Lipitor and Tylenol at home and either medication can effect liver so will hold both meds- had similar issue last admit and improved with holding statin  - Acute hepatitis panel on admit negative.   - Trend daily CMP.  - No signs of liver failure. Do not suspect underlying liver disease and likely medication effect.   -improving ast 60, alt 85 and now 85/69 and watch    Stage 3b chronic kidney disease  Creatine stable and at baseline BMP reviewed- noted Estimated Creatinine Clearance: 29.8 mL/min (A) (based on SCr of 2 mg/dL (H)). according to latest data. Based on current GFR, CKD stage is stage 3b. Baseline Cr  1.7-2.0. Monitor UOP and serial BMP and adjust therapy as needed. Renally dose meds. Avoid nephrotoxic medications and procedures.      Acute cystitis without hematuria  - U/A on admit with > 100 WBC and many bacteria consistent with UTI. Urine culture sent and patient started on IV Ceftriaxone 1 gram daily to treat + vanc given recent semi resistant ecoli + e faecalis last month   - Follow-up urine culture results.- with ecoli and on rocephin    Type 2 diabetes mellitus with stage 3b chronic kidney disease, with long-term current use of insulin  Patient's FSGs are controlled on current medication regimen. Patient on Lantus insulin 19 units in the evening to treat at home.   Last A1c reviewed-   Lab Results   Component Value Date    HGBA1C 8.2 (H) 07/10/2024     Most recent fingerstick glucose reviewed-   Recent Labs   Lab 09/26/24  1527 09/26/24  2126 09/27/24  0718   POCTGLUCOSE 138* 104 97       Current correctional scale  Low  Maintain anti-hyperglycemic dose as follows-   Antihyperglycemics (From admission, onward)      Start     Stop Route Frequency Ordered    09/27/24 2100  insulin glargine U-100 (Lantus) pen 16 Units         -- SubQ Nightly 09/27/24 0821    09/25/24 0008  insulin aspart U-100 pen 0-5 Units         -- SubQ Before meals & nightly PRN 09/24/24 2308          Hold Oral hypoglycemics while patient is in the hospital.  Target blood sugars 140-180 in hospital.  Monitor blood sugars with meals and at bedtime in hospital.   Diabetic diet post-op.   -watch closely as labile and had more lows than highs last admit, and took ozempic home med when was ath ome briefly which decreases her snacking. Dec levemir on 9/27 as glucose below 100     Acute on chronic diastolic heart failure  Patient is identified as having Diastolic (HFpEF) heart failure that is Acute on chronic. CHF is currently uncontrolled due to Pulmonary edema/pleural effusion on CXR. CXR on admit with mild pulmonary congestion and BNP  elevated at 1373 on admit. Patient with mild excerebration. Latest ECHO performed and demonstrates- Results for orders placed during the hospital encounter of 11/03/23    Echo    Interpretation Summary    Left Ventricle: The left ventricle is normal in size. Increased wall thickness. Moderate septal thickening. Normal wall motion. There is normal systolic function with a visually estimated ejection fraction of 65 - 70%. Grade I diastolic dysfunction.    Right Ventricle: Normal right ventricular cavity size. Systolic function is normal.    Left Atrium: Left atrium is severely dilated.    Aortic Valve: There is moderate stenosis. Aortic valve area by VTI is 1.02 cm². Aortic valve peak velocity is 2.59 m/s. Mean gradient is 18 mmHg. The dimensionless index is 0.32.    Mitral Valve: There is mild regurgitation.    Tricuspid Valve: There is mild regurgitation.    Pulmonary Artery: The estimated pulmonary artery systolic pressure is 35 mmHg.    IVC/SVC: Normal venous pressure at 3 mmHg.    - started on IV Lasix 40 mg BID to treat mild heart failure. Excerebration very mild so okay to proceed with surgery as patient not hypoxic and not having any respiratory distress. Watch given mild cr high limit normal on admit but improving 9/26.  - Continue home Imdur and Toprol XL for chronic heart failure and monitor clinical status closely. Monitor on telemetry.   - Patient is off CHF pathway.    - Monitor strict Is&Os and daily weights.    - Place on fluid restriction of 1.5 L. Cardiology has not been consulted.   - Continue to stress to patient importance of self efficacy and  on diet for CHF.   - Last BNP reviewed- and noted below   Recent Labs   Lab 09/25/24  0248   BNP 1,373*     Still on oxygen 927 with signs of mild overload so keep on IV for now and off oxygen 9/29 but reports some subjective dyspnea at rest and when laying flat still so continue and reassess tomorrow for oral conversion    Iron deficiency  anemia  Acute blood loss anemia  Anemia is likely due to Iron deficiency and acute blood loss from right ankle. Patient with baseline iron deficiency anemia with baseline Hgb 9-10. Hgb 7.9 on admit and patient reports blood loss from ankle at home when wound dehisced causing worsening anemia. Hgb 7.9 on admit and down to 7.4 on 9/25. Patient typed and crossed and blood consent obtained and 7/3 now, will likely need transfusion in next day or so given lower 7.s watch to ensure no other bleeding signs.  Recent Labs     09/24/24  1622 09/25/24  0248 09/26/24  0447   HGB 7.9* 7.4* 7.3*   HCT 25.9* 24.1* 23.5*       Plan  - Monitor serial CBC: Daily  - Transfuse PRBC if patient becomes hemodynamically unstable, symptomatic or H/H drops below 7/21.  - Patient has not received any PRBC transfusions to date      Coronary artery disease of native artery of native heart with stable angina pectoris  Patient with known CAD s/p stent placement, which is controlled. Monitor for S/Sx of angina/ACS. Continue to monitor on telemetry. Last discharge was placed on brillinta as home cards told her never go off of it and had asa held until oct 26 when goes off eilquis to avoid triple therapy  Had asa started 9/25 post op, will plan to adjust back to brillinta given this over weekedn as hg lower 7's and will do this slowly while titrating back on to regimen was sent home last admit (eliquis until oct 26th and brillinta)  -Had stents placed in 2018 to rca and 2020 to 2 areas per dr cervantes note from [facility], had stress in 2022 that was negative for ischemia. She said he wanted to recheck another one recently but insurance did not cover. She said had aspirin allergy testing before it was restarted due to prev intolerance after a stent   -will go back to brillinta 9/28 as previous plan, and hold asa to avoid triple therapy while on eliquis as above. Will need to find out ortho plan if want to keep on eliquis longer now given longer NWB  status with this ankle dehisc, vs going to ASA BID after oct 26 which was initial period of eliquis for co-ppx for ankle and pelvic fx    Mixed hyperlipidemia  Chronic and controlled. Hold home Lipitor for now due elevated LFT's.     Follicular lymphoma  S/p chemo in 2010. In remission.     Primary hypertension  Chronic, controlled. Latest blood pressure and vitals reviewed-     Temp:  [97.5 °F (36.4 °C)-98.8 °F (37.1 °C)]   Pulse:  [62-94]   Resp:  [16-20]   BP: (137-176)/(61-73)   SpO2:  [94 %-99 %] .   Home meds for hypertension were reviewed and noted below.   Hypertension Medications               isosorbide mononitrate (IMDUR) 60 MG 24 hr tablet Take 1 tablet (60 mg total) by mouth once daily.    metoprolol succinate (TOPROL-XL) 25 MG 24 hr tablet Take 1 tablet (25 mg total) by mouth once daily.            While in the hospital, will manage blood pressure as follows; Continue home antihypertensive regimen to treat in hospital.  -BP higher 9/26, had hydralazine added to isorbide/metoprolol last admit so start hydralazine now as BP 180s and she reporst pain minimal  -improving 9/27 but higher again 9/28 so will inc to 100    Will utilize p.r.n. blood pressure medication only if patient's blood pressure greater than 180/110 and she develops symptoms such as worsening chest pain or shortness of breath.      VTE Risk Mitigation (From admission, onward)           Ordered     apixaban tablet 2.5 mg  2 times daily         09/27/24 1238     IP VTE HIGH RISK PATIENT  Once         09/24/24 2231     Reason for No Pharmacological VTE Prophylaxis  Once        Question:  Reasons:  Answer:  Already adequately anticoagulated on oral Anticoagulants    09/24/24 2231                    Discharge Planning   MIKHAIL: 9/30/2024     Code Status: Full Code   Is the patient medically ready for discharge?:     Reason for patient still in hospital (select all that apply): Patient trending condition  Discharge Plan A: Home with family, Home  Health                  Rupal Ochoa MD  Department of Hospital Medicine   [facility] - Surgery

## 2024-09-29 NOTE — ASSESSMENT & PLAN NOTE
Oral vancomycin 125 mg po q 6 hours for 10 days.  Then patient should continue on oral vancomycin tid for the duration of the time that she is on antibiotics for her right ankle infection.

## 2024-09-29 NOTE — PLAN OF CARE
Outpatient Antibiotic Therapy Plan:    Please send referral to [facility].    1) Infection: hardware complicating wound infection, C diff colitis    2) Discharge Antibiotics:    Intravenous antibiotics:  Daptomycin 700 mg IV q 24 hours for 6 weeks with stop date of 11/7/24  Ceftriaxone 2 grams IV q 24 hours for 6 weeks with stop date of 11/7/24    Oral antibiotics:  Oral vancomycin 125 mg PO q 6 hours for 10 days then twice a day for the remainder of the time that she is on IV antibiotics (6 weeks).    3) Outpatient Weekly Labs:    Order the following labs to be drawn on Mondays:   CBC  CMP   CPK    4) Fax Lab Results to Infectious Diseases Provider: Dr. Dhaliwal    [facility] Fax Number: [phone]    5) Outpatient Infectious Diseases Follow-up    Follow-up appointment will be arranged by the ID clinic and will be found in the patient's appointments tab.    Prior to discharge, please ensure the patient's follow-up has been scheduled.    If there is still no follow-up scheduled prior to discharge, please send an [employer] message to [facility] [place] or Call Infectious Diseases Dept.

## 2024-09-29 NOTE — ASSESSMENT & PLAN NOTE
Closed right pilon fracture s/p ORIF on 8/14/2024  74 yo female with history of a fall resulting in a pilon fracture to the right ankle on 8/14/24 s/p ORIF. Sutures removed on 09/12/2024 in Ortho clinic. Presented to hospital for swelling and wound dehiscence to right ankle. No leukocytosis. CRP 9.3 and ESR 49. X-ray of right ankle on admit showed Orthopedic hardware intact with no acute fracture or soft tissue swelling.    - Orthopedics consulted in ED:       - Wound irrigated and soft dressings applied in ED.  - Patient non weight bearing to right lower extremity as per Ortho.   - Multimodal pain regimen. Avoid Tylenol as AST and ALT elevated. Oxycodone IR po prn for breakthrough pain.   - Plan for irrigation and debridement of right ankle wound in OR today (9/25/24) with Dr. Davalos and Orthopedics with vac placed and full op note still pending from 9/25.  -vanc/rocephin for coverage with serosanginous drainage reported and ortho note reports ID consulted but they were not so consulted 9/27 given ortho feels concern for infectious given this with hardware exposed. ID changed to dapto/rocephin on 9/28 and appreciate assistance. Rocephin stopped 9/28 per ID  -NWB until at least 11/1 per ortho-   -prevena wound vac for a week  -was on eliquis until oct 26th post pelvic/ankel fx for ppx, okay to resume per ortho and resumed 9/28  Rec for SNF but she declines as out of days and misses her cats and had bad experience. Family aware

## 2024-09-29 NOTE — ASSESSMENT & PLAN NOTE
73 year old female with a history of right pilon fracture s/p ORIF on 8/14/24.  She is now admitted with wound dehiscence with exposure of the hardware to the environment.  She underwent I&D on 9/25/24.  Surgical cultures are still negative to date.    Plan  Continue Daptomycin IV  Re-start IV ceftriaxone.  Recommend 6 weeks of antibiotics.  Re-consult ID if surgery decides to remove the hardware for an updated treatment plan.

## 2024-09-29 NOTE — PLAN OF CARE
Problem: Adult Inpatient Plan of Care  Goal: Plan of Care Review  Outcome: Progressing  Goal: Patient-Specific Goal (Individualized)  Outcome: Progressing  Goal: Absence of Hospital-Acquired Illness or Injury  Outcome: Progressing  Goal: Optimal Comfort and Wellbeing  Outcome: Progressing  Goal: Readiness for Transition of Care  Outcome: Progressing     Problem: Infection  Goal: Absence of Infection Signs and Symptoms  Outcome: Progressing     Problem: Diabetes Comorbidity  Goal: Blood Glucose Level Within Targeted Range  Outcome: Progressing     Problem: Acute Kidney Injury/Impairment  Goal: Fluid and Electrolyte Balance  Outcome: Progressing  Goal: Improved Oral Intake  Outcome: Progressing  Goal: Effective Renal Function  Outcome: Progressing     Problem: Wound  Goal: Optimal Coping  Outcome: Progressing  Goal: Optimal Functional Ability  Outcome: Progressing  Goal: Absence of Infection Signs and Symptoms  Outcome: Progressing  Goal: Improved Oral Intake  Outcome: Progressing  Goal: Optimal Pain Control and Function  Outcome: Progressing  Goal: Skin Health and Integrity  Outcome: Progressing  Goal: Optimal Wound Healing  Outcome: Progressing     Problem: Skin Injury Risk Increased  Goal: Skin Health and Integrity  Outcome: Progressing     Problem: Fall Injury Risk  Goal: Absence of Fall and Fall-Related Injury  Outcome: Progressing

## 2024-09-29 NOTE — SUBJECTIVE & OBJECTIVE
Interval History: she reports stools still loose as just starting c diff treatment as + test overnight when stool sent after discussing with ID who agreed that needed testing. She reports that it started the day she was admitted so suspect she was exposed to c diff at SNF she was at in the last month given it was basically present the day of admit she said. Stop lomotil with + c diff and oral vanc started by night team once + result and on precautions now. She repots her daughter had a feeling it was ac diff as a relative of theirs had it a few years ago and it reminded her of that. Cr 1.5. she reports still some dyspnea laying flat, off oxygen this AM so approaching euvolemic with IV lasix still on but suspect may be able to titrate off tomorrow, just awaiting improved subnective dyspnea now. Hg stable on labs, no culture growth so far and on daptomycin per ID recs and rocephin stopped yesterday as they talked to dr patino with ortho who reports hardware was exposed which is concern for coverage given this finding intra-op. She feels somewhat depressed with all the medical things going on but I reassured her that her labs are looking better, glucose is looking good,w e have an answer for her diarrhea so should be on the right track soon with this and should be talking about discharge early this week as well as therapy stating she had a better session with them yesterday. Her sister is coming today to watch the [employer] game with her.      Review of Systems   Constitutional:  Negative for chills and fever.   Respiratory:  Negative for cough and shortness of breath.    Cardiovascular:  Negative for chest pain, palpitations and leg swelling.   Gastrointestinal:  Negative for abdominal pain, nausea and vomiting.   Genitourinary:  Negative for difficulty urinating.   Musculoskeletal:  Negative for arthralgias, joint swelling and myalgias.   Skin:  Positive for wound (Right ankle surgical wound).   Neurological:  Positive  for weakness (Generalized). Negative for dizziness and light-headedness.   Psychiatric/Behavioral:  Negative for confusion and hallucinations.      Objective:     Vital Signs (Most Recent):  Temp: 97.8 °F (36.6 °C) (09/25/24 0731)  Pulse: 97 (09/25/24 1122)  Resp: 18 (09/25/24 0731)  BP: 157/69 (09/25/24 0731)  SpO2: 93 % (09/25/24 0731) on room air Vital Signs (24h Range):  Temp:  [98 °F (36.7 °C)-98.7 °F (37.1 °C)] 98.3 °F (36.8 °C)  Pulse:  [62-94] 81  Resp:  [16-18] 17  SpO2:  [95 %-99 %] 95 %  BP: (124-175)/(56-71) 142/62     Weight: 85.4 kg (188 lb 4.4 oz)  Body mass index is 27.01 kg/m².    Intake/Output Summary (Last 24 hours) at 9/29/2024 1115  Last data filed at 9/28/2024 2300  Gross per 24 hour   Intake 119 ml   Output 400 ml   Net -281 ml         Physical Exam  Vitals and nursing note reviewed.   Constitutional:       General: She is awake. She is not in acute distress.     Appearance: Normal appearance. She is normal weight. She is not ill-appearing.      Comments: . Patient awake and alert and oriented x 4.    Eyes:      Conjunctiva/sclera: Conjunctivae normal.   Cardiovascular:      Rate and Rhythm: Normal rate and regular rhythm.      Heart sounds: Normal heart sounds. No murmur heard.     No friction rub. No gallop.   Pulmonary:      Effort: Pulmonary effort is normal. No respiratory distress.      Breath sounds: Normal breath sounds. No wheezing.      Comments: On room air but reports some subejctive dyspnea at rest and when laying flat. approaching euvolemia  Abdominal:      General: Abdomen is flat. Bowel sounds are normal. There is no distension.      Palpations: Abdomen is soft.      Tenderness: There is no abdominal tenderness.   Musculoskeletal:      Right lower leg: No edema.      Left lower leg: No edema.      Comments: Right ankle wound vac   Skin:     General: Skin is warm.      Findings: No erythema.      Comments: Bluish coloration to groin areas related to application of Gentian violet  "  Neurological:      Mental Status: She is alert and oriented to person, place, and time.   Psychiatric:         Mood and Affect: Mood normal.         Behavior: Behavior normal. Behavior is cooperative.         Thought Content: Thought content normal.         Judgment: Judgment normal.             Significant Labs: A1C:   Recent Labs   Lab 07/10/24  1105   HGBA1C 8.2*     CBC:   Recent Labs   Lab 09/28/24  0313 09/29/24  0353   WBC 8.80 12.96*   HGB 9.5* 10.2*   HCT 29.9* 32.7*    311     CMP:   Recent Labs   Lab 09/28/24 0313 09/29/24  0353   * 135*   K 3.4* 3.4*   CL 98 94*   CO2 29 30*    140*   BUN 26* 25*   CREATININE 1.6* 1.5*   CALCIUM 8.6* 8.7   PROT 5.9* 6.2   ALBUMIN 2.1* 2.3*   BILITOT 0.4 0.4   ALKPHOS 323* 303*   AST 50* 42*   ALT 53* 42   ANIONGAP 8 11     Cardiac Markers:   No results for input(s): "CKMB", "MYOGLOBIN", "BNP", "TROPISTAT" in the last 48 hours.      Magnesium:   Recent Labs   Lab 09/28/24  0313 09/29/24  0353   MG 2.0 2.2     POCT Glucose:   Recent Labs   Lab 09/28/24  1621 09/28/24  2059 09/29/24  0746   POCTGLUCOSE 117* 163* 140*     Urine Studies:   No results for input(s): "COLORU", "APPEARANCEUA", "PHUR", "SPECGRAV", "PROTEINUA", "GLUCUA", "KETONESU", "BILIRUBINUA", "OCCULTUA", "NITRITE", "UROBILINOGEN", "LEUKOCYTESUR", "RBCUA", "WBCUA", "BACTERIA", "SQUAMEPITHEL", "HYALINECASTS" in the last 48 hours.    Invalid input(s): "WRIGHTSUR"      Significant Imaging: I have reviewed all pertinent imaging results/findings within the past 24 hours.  "

## 2024-09-30 LAB
ALBUMIN SERPL BCP-MCNC: 2.1 G/DL (ref 3.5–5.2)
ALP SERPL-CCNC: 245 U/L (ref 55–135)
ALT SERPL W/O P-5'-P-CCNC: 32 U/L (ref 10–44)
ANION GAP SERPL CALC-SCNC: 8 MMOL/L (ref 8–16)
AST SERPL-CCNC: 34 U/L (ref 10–40)
BACTERIA SPEC AEROBE CULT: NO GROWTH
BACTERIA SPEC AEROBE CULT: NO GROWTH
BACTERIA SPEC ANAEROBE CULT: NORMAL
BILIRUB SERPL-MCNC: 0.3 MG/DL (ref 0.1–1)
BUN SERPL-MCNC: 26 MG/DL (ref 8–23)
CALCIUM SERPL-MCNC: 8.4 MG/DL (ref 8.7–10.5)
CHLORIDE SERPL-SCNC: 97 MMOL/L (ref 95–110)
CO2 SERPL-SCNC: 31 MMOL/L (ref 23–29)
CREAT SERPL-MCNC: 1.7 MG/DL (ref 0.5–1.4)
ERYTHROCYTE [DISTWIDTH] IN BLOOD BY AUTOMATED COUNT: 15.8 % (ref 11.5–14.5)
EST. GFR  (NO RACE VARIABLE): 31.5 ML/MIN/1.73 M^2
GLUCOSE SERPL-MCNC: 109 MG/DL (ref 70–110)
GLUCOSE SERPL-MCNC: 156 MG/DL (ref 70–110)
HCT VFR BLD AUTO: 28.5 % (ref 37–48.5)
HGB BLD-MCNC: 8.7 G/DL (ref 12–16)
MAGNESIUM SERPL-MCNC: 2.3 MG/DL (ref 1.6–2.6)
MCH RBC QN AUTO: 29 PG (ref 27–31)
MCHC RBC AUTO-ENTMCNC: 30.5 G/DL (ref 32–36)
MCV RBC AUTO: 95 FL (ref 82–98)
PHOSPHATE SERPL-MCNC: 3.4 MG/DL (ref 2.7–4.5)
PLATELET # BLD AUTO: 262 K/UL (ref 150–450)
PMV BLD AUTO: 12.3 FL (ref 9.2–12.9)
POCT GLUCOSE: 137 MG/DL (ref 70–110)
POCT GLUCOSE: 156 MG/DL (ref 70–110)
POCT GLUCOSE: 165 MG/DL (ref 70–110)
POCT GLUCOSE: 211 MG/DL (ref 70–110)
POCT GLUCOSE: 91 MG/DL (ref 70–110)
POTASSIUM SERPL-SCNC: 3.5 MMOL/L (ref 3.5–5.1)
PROT SERPL-MCNC: 5.7 G/DL (ref 6–8.4)
RBC # BLD AUTO: 3 M/UL (ref 4–5.4)
SODIUM SERPL-SCNC: 136 MMOL/L (ref 136–145)
WBC # BLD AUTO: 14.77 K/UL (ref 3.9–12.7)

## 2024-09-30 PROCEDURE — 83735 ASSAY OF MAGNESIUM: CPT | Mod: HCNC | Performed by: HOSPITALIST

## 2024-09-30 PROCEDURE — 25000003 PHARM REV CODE 250: Mod: HCNC | Performed by: INTERNAL MEDICINE

## 2024-09-30 PROCEDURE — 0QBL0ZZ EXCISION OF RIGHT TARSAL, OPEN APPROACH: ICD-10-PCS | Performed by: ORTHOPAEDIC SURGERY

## 2024-09-30 PROCEDURE — 27000221 HC OXYGEN, UP TO 24 HOURS: Mod: HCNC

## 2024-09-30 PROCEDURE — 25000003 PHARM REV CODE 250: Mod: HCNC | Performed by: HOSPITALIST

## 2024-09-30 PROCEDURE — 63600175 PHARM REV CODE 636 W HCPCS: Mod: HCNC | Performed by: INTERNAL MEDICINE

## 2024-09-30 PROCEDURE — 97535 SELF CARE MNGMENT TRAINING: CPT | Mod: HCNC

## 2024-09-30 PROCEDURE — 85027 COMPLETE CBC AUTOMATED: CPT | Mod: HCNC | Performed by: HOSPITALIST

## 2024-09-30 PROCEDURE — 80053 COMPREHEN METABOLIC PANEL: CPT | Mod: HCNC | Performed by: HOSPITALIST

## 2024-09-30 PROCEDURE — 25000003 PHARM REV CODE 250: Mod: HCNC

## 2024-09-30 PROCEDURE — 0JXN0ZZ TRANSFER RIGHT LOWER LEG SUBCUTANEOUS TISSUE AND FASCIA, OPEN APPROACH: ICD-10-PCS | Performed by: ORTHOPAEDIC SURGERY

## 2024-09-30 PROCEDURE — 97110 THERAPEUTIC EXERCISES: CPT | Mod: HCNC,CQ

## 2024-09-30 PROCEDURE — 27000207 HC ISOLATION: Mod: HCNC

## 2024-09-30 PROCEDURE — 11000001 HC ACUTE MED/SURG PRIVATE ROOM: Mod: HCNC

## 2024-09-30 PROCEDURE — 99900035 HC TECH TIME PER 15 MIN (STAT): Mod: HCNC

## 2024-09-30 PROCEDURE — 84100 ASSAY OF PHOSPHORUS: CPT | Mod: HCNC | Performed by: HOSPITALIST

## 2024-09-30 PROCEDURE — 94761 N-INVAS EAR/PLS OXIMETRY MLT: CPT | Mod: HCNC

## 2024-09-30 PROCEDURE — 36415 COLL VENOUS BLD VENIPUNCTURE: CPT | Mod: HCNC | Performed by: HOSPITALIST

## 2024-09-30 PROCEDURE — 25000003 PHARM REV CODE 250: Mod: HCNC | Performed by: NURSE PRACTITIONER

## 2024-09-30 RX ORDER — FUROSEMIDE 40 MG/1
40 TABLET ORAL 2 TIMES DAILY
Qty: 60 TABLET | Refills: 2 | Status: SHIPPED | OUTPATIENT
Start: 2024-09-30 | End: 2025-09-30

## 2024-09-30 RX ORDER — INSULIN GLARGINE 100 [IU]/ML
16 INJECTION, SOLUTION SUBCUTANEOUS NIGHTLY
Start: 2024-09-30 | End: 2025-09-30

## 2024-09-30 RX ORDER — VANCOMYCIN HYDROCHLORIDE 125 MG/1
CAPSULE ORAL
Qty: 108 CAPSULE | Refills: 0 | Status: SHIPPED | OUTPATIENT
Start: 2024-09-30

## 2024-09-30 RX ORDER — HYDRALAZINE HYDROCHLORIDE 100 MG/1
100 TABLET, FILM COATED ORAL EVERY 8 HOURS
Qty: 90 TABLET | Refills: 1 | Status: SHIPPED | OUTPATIENT
Start: 2024-09-30 | End: 2025-09-30

## 2024-09-30 RX ORDER — FUROSEMIDE 20 MG/1
20 TABLET ORAL 2 TIMES DAILY
Status: DISCONTINUED | OUTPATIENT
Start: 2024-09-30 | End: 2024-10-01

## 2024-09-30 RX ORDER — FUROSEMIDE 20 MG/1
20 TABLET ORAL 2 TIMES DAILY
Qty: 60 TABLET | Refills: 2 | Status: SHIPPED | OUTPATIENT
Start: 2024-09-30 | End: 2024-09-30

## 2024-09-30 RX ORDER — METHOCARBAMOL 500 MG/1
500 TABLET, FILM COATED ORAL 4 TIMES DAILY
Qty: 65 TABLET | Refills: 1 | Status: SHIPPED | OUTPATIENT
Start: 2024-09-30 | End: 2024-10-02

## 2024-09-30 RX ADMIN — FUROSEMIDE 20 MG: 20 TABLET ORAL at 08:09

## 2024-09-30 RX ADMIN — VANCOMYCIN HYDROCHLORIDE 125 MG: KIT at 05:09

## 2024-09-30 RX ADMIN — ALUMINUM HYDROXIDE, MAGNESIUM HYDROXIDE, AND SIMETHICONE 30 ML: 1200; 120; 1200 SUSPENSION ORAL at 05:09

## 2024-09-30 RX ADMIN — SUCRALFATE 1 G: 1 SUSPENSION ORAL at 05:09

## 2024-09-30 RX ADMIN — HYDRALAZINE HYDROCHLORIDE 100 MG: 50 TABLET ORAL at 02:09

## 2024-09-30 RX ADMIN — HYDROCORTISONE: 25 CREAM TOPICAL at 09:09

## 2024-09-30 RX ADMIN — METHOCARBAMOL 500 MG: 500 TABLET ORAL at 08:09

## 2024-09-30 RX ADMIN — ALUMINUM HYDROXIDE, MAGNESIUM HYDROXIDE, AND SIMETHICONE 30 ML: 1200; 120; 1200 SUSPENSION ORAL at 08:09

## 2024-09-30 RX ADMIN — FUROSEMIDE 20 MG: 20 TABLET ORAL at 06:09

## 2024-09-30 RX ADMIN — TICAGRELOR 90 MG: 90 TABLET ORAL at 08:09

## 2024-09-30 RX ADMIN — HYDRALAZINE HYDROCHLORIDE 100 MG: 50 TABLET ORAL at 05:09

## 2024-09-30 RX ADMIN — APIXABAN 2.5 MG: 2.5 TABLET, FILM COATED ORAL at 08:09

## 2024-09-30 RX ADMIN — OXYCODONE HYDROCHLORIDE 5 MG: 5 TABLET ORAL at 08:09

## 2024-09-30 RX ADMIN — DAPTOMYCIN 685 MG: 350 INJECTION, POWDER, LYOPHILIZED, FOR SOLUTION INTRAVENOUS at 08:09

## 2024-09-30 RX ADMIN — HYDROXYZINE HYDROCHLORIDE 25 MG: 25 TABLET, FILM COATED ORAL at 05:09

## 2024-09-30 RX ADMIN — ISOSORBIDE MONONITRATE 60 MG: 30 TABLET, EXTENDED RELEASE ORAL at 08:09

## 2024-09-30 RX ADMIN — ALUMINUM HYDROXIDE, MAGNESIUM HYDROXIDE, AND SIMETHICONE 30 ML: 1200; 120; 1200 SUSPENSION ORAL at 10:09

## 2024-09-30 RX ADMIN — ALUMINUM HYDROXIDE, MAGNESIUM HYDROXIDE, AND SIMETHICONE 30 ML: 1200; 120; 1200 SUSPENSION ORAL at 04:09

## 2024-09-30 RX ADMIN — VANCOMYCIN HYDROCHLORIDE 125 MG: KIT at 12:09

## 2024-09-30 RX ADMIN — CEFTRIAXONE SODIUM 2 G: 2 INJECTION, POWDER, FOR SOLUTION INTRAMUSCULAR; INTRAVENOUS at 06:09

## 2024-09-30 RX ADMIN — METHOCARBAMOL 500 MG: 500 TABLET ORAL at 12:09

## 2024-09-30 RX ADMIN — HYDROXYZINE HYDROCHLORIDE 25 MG: 25 TABLET, FILM COATED ORAL at 08:09

## 2024-09-30 RX ADMIN — FLUOXETINE HYDROCHLORIDE 40 MG: 20 CAPSULE ORAL at 08:09

## 2024-09-30 RX ADMIN — METOPROLOL SUCCINATE 25 MG: 25 TABLET, EXTENDED RELEASE ORAL at 08:09

## 2024-09-30 RX ADMIN — VANCOMYCIN HYDROCHLORIDE 125 MG: KIT at 06:09

## 2024-09-30 RX ADMIN — METHOCARBAMOL 500 MG: 500 TABLET ORAL at 04:09

## 2024-09-30 RX ADMIN — INSULIN GLARGINE 16 UNITS: 100 INJECTION, SOLUTION SUBCUTANEOUS at 10:09

## 2024-09-30 RX ADMIN — SUCRALFATE 1 G: 1 SUSPENSION ORAL at 06:09

## 2024-09-30 RX ADMIN — HYDRALAZINE HYDROCHLORIDE 100 MG: 50 TABLET ORAL at 08:09

## 2024-09-30 RX ADMIN — QUETIAPINE FUMARATE 50 MG: 25 TABLET ORAL at 08:09

## 2024-09-30 RX ADMIN — SUCRALFATE 1 G: 1 SUSPENSION ORAL at 12:09

## 2024-09-30 RX ADMIN — OXYCODONE HYDROCHLORIDE 5 MG: 5 TABLET ORAL at 10:09

## 2024-09-30 RX ADMIN — Medication 6 MG: at 10:09

## 2024-09-30 NOTE — PLAN OF CARE
"[facility]      HOME HEALTH ORDERS  FACE TO FACE ENCOUNTER    Patient Name: Lorena Contreras  YOB: 1950    PCP: Jorge Paris MD   PCP Address: [address] / [id number]  PCP Phone Number: [phone]  PCP Fax: [phone]    Encounter Date: 10/03/2024    Admit to Home Health    Diagnoses:  Active Hospital Problems    Diagnosis  POA    *Wound dehiscence, right ankle [T81.30XA]  Yes     Priority: 1 - High    C. difficile colitis [A04.72]  Yes     Priority: 2     Type 2 diabetes mellitus with stage 3b chronic kidney disease, with long-term current use of insulin [E11.22, N18.32, Z79.4]  Not Applicable     Priority: 3     Acute blood loss anemia [D62]  Yes     Priority: 4     Stage 3b chronic kidney disease [N18.32]  Yes     Priority: 4     Acute on chronic diastolic heart failure [I50.33]  Yes     Priority: 5      Noted on echo 11/18/2019:  Grade II (moderate) left ventricular diastolic dysfunction consistent with pseudonormalization         Coronary artery disease of native artery of native heart with stable angina pectoris [I25.118]  Yes     Priority: 6      March 29, 2019:  C and PCI of RCA -  "Patient has serial mid 99% eccentric lesions consistent with plaque rupture site and abnormal EKG findings.... We initially pre-dilated with 3.0 balloon.  We placed a 3.0 by 12 mm resolute kenya stent in the midportion of the vessel.  We overlapped that with a 3.5 x 15 mm resolute kenya stent in the more proximal portion mid RCA.  Good result was achieved with MADINA 3 flow through the vessel.  Lad has a mid 90% stenosis and the left circumflex as well as the long 95% lesion as well.   Cardiac catheterization 01/08/2020:  1.  Successful PCI of proximal ramus with drug-eluting stent x1 (2.0 x 6 mm).  IVUS guidance was utilized for this PCI.  Post PCI IVUS demonstrated well-opposed and expanded stent.  MADINA 3 flow pre and post PCI.   2.  Successful PCI of circumflex with " drug-eluting stent x1 (2.25 x 22 mm).  MADINA 3 flow pre and post PCI         Primary hypertension [I10]  Yes     Priority: 8     Mixed hyperlipidemia [E78.2]  Yes     Priority: 9     Follicular lymphoma [C82.90]  Yes     Priority: 10        follicular lymphoma diagnosed in late 2009. She was treated with chemotherapy (withour Rituximab due to allergy, unclear regimen) and apparently achieved a CR. She was then lost to follow up die to lack of insurance until 6/26/13 when she re-establish care in our clinic. Surveillance CTs from 7/9/13 are significant for unspecific some tissue prominence in the ventral tongue that is unspecific, unspecific pulmonary nodules and non bulky LAD in the internal jugular chain measuring up to 14 mm. These finding are unspecific and not indicative of follicular lymphoma recurrence.        Iron deficiency anemia [D50.9]  Yes     Priority: 11     Hypokalemia [E87.6]  No     Priority: 12     Neck muscle spasm [M62.838]  No     Priority: 13     Closed right pilon fracture [S82.871A]  Yes      Resolved Hospital Problems    Diagnosis Date Resolved POA    Acute cystitis without hematuria [N30.00] 10/03/2024 Yes     Priority: 4     Transaminitis [R74.01] 10/03/2024 Yes     Priority: 7     Diarrhea [R19.7] 10/02/2024 Yes       Follow Up Appointments:  Future Appointments   Date Time Provider Department Center   10/4/2024  2:30 PM Raheem Bucio NP [facility] ORTHO [facility]   10/24/2024  9:40 AM Jorge Paris MD [facility]   11/5/2024 10:00 AM William Dhaliwal MD [facility] MARTHA [facility] m[facility]   11/6/2024 11:00 AM [facility] [place]   11/13/2024  3:00 PM Gerardo Barbour MD [facility]       Allergies:  Review of patient's allergies indicates:   Allergen Reactions    Novolin 70/30 (semi-synthetic) Nausea And Vomiting     Severe vomiting on Relion 70/30    Sulfa (sulfonamide antibiotics) Anaphylaxis    Talwin [pentazocine lactate] Anaphylaxis    Victoza [liraglutide]  Nausea And Vomiting    Glipizide Nausea Only    Citric acid     Codeine     Influenza virus vaccines Hives    Iodine and iodide containing products Hives    Levetiracetam Itching    Neurontin [gabapentin]      Possible associated myoclonic jerk    Rituxan [rituximab] Hives    Zoloft [sertraline] Nausea And Vomiting       Medications: Review discharge medications with patient and family and provide education.    Current Facility-Administered Medications   Medication Dose Route Frequency Provider Last Rate Last Admin    0.9%  NaCl infusion (for blood administration)   Intravenous Q24H PRN Rupal Ochoa MD        albuterol-ipratropium 2.5 mg-0.5 mg/3 mL nebulizer solution 3 mL  3 mL Nebulization Q4H PRN Barb Live PA-C        aluminum-magnesium hydroxide-simethicone 200-200-20 mg/5 mL suspension 30 mL  30 mL Oral QID PRN Barb Live PA-C   30 mL at 09/29/24 0608    aluminum-magnesium hydroxide-simethicone 200-200-20 mg/5 mL suspension 30 mL  30 mL Oral QID (AC & HS) Barb Live PA-C   30 mL at 10/03/24 0519    apixaban tablet 2.5 mg  2.5 mg Oral BID Rupal Ochoa MD   2.5 mg at 10/03/24 0852    dextrose 10% bolus 125 mL 125 mL  12.5 g Intravenous PRN Barb Live PA-C        dextrose 10% bolus 250 mL 250 mL  25 g Intravenous PRN Barb Live PA-C        doxycycline tablet 100 mg  100 mg Oral Q12H Rupal Ochoa MD   100 mg at 10/03/24 0852    FLUoxetine capsule 40 mg  40 mg Oral Daily Barb Live PA-C   40 mg at 10/03/24 0852    furosemide tablet 40 mg  40 mg Oral BID Rupal Ochoa MD   40 mg at 10/03/24 0852    glucagon (human recombinant) injection 1 mg  1 mg Intramuscular PRN Barb Live PA-C        glucose chewable tablet 16 g  16 g Oral PRN Barb Live PA-C        glucose chewable tablet 24 g  24 g Oral PRN Barb Live PA-C        hydrALAZINE tablet 100 mg  100 mg Oral Q8H Rupal Ochoa MD   100 mg at 10/03/24 0519     hydrocortisone 2.5 % rectal cream   Rectal BID Rupal Ochoa MD   Given at 10/03/24 0852    hydrOXYzine HCL tablet 25 mg  25 mg Oral TID PRN Barb Live PA-C   25 mg at 09/30/24 2010    insulin aspart U-100 pen 0-5 Units  0-5 Units Subcutaneous QID (AC + HS) PRN Barb Live PA-C   1 Units at 10/02/24 2128    insulin glargine U-100 (Lantus) pen 16 Units  16 Units Subcutaneous QHS Rupal Ochoa MD   16 Units at 10/02/24 2128    isosorbide mononitrate 24 hr tablet 60 mg  60 mg Oral Daily Barb Live PA-C   60 mg at 10/03/24 0852    melatonin tablet 6 mg  6 mg Oral Nightly PRN Barb Live PA-C   6 mg at 10/02/24 2122    methocarbamoL tablet 500 mg  500 mg Oral QID Daniela Abernathy MD   500 mg at 10/03/24 0852    metoprolol succinate (TOPROL-XL) 24 hr tablet 25 mg  25 mg Oral Daily Brab Live PA-C   25 mg at 10/03/24 0852    naloxone 0.4 mg/mL injection 0.02 mg  0.02 mg Intravenous PRN Barb Live PA-C        ondansetron tablet 4 mg  4 mg Oral Q6H PRN SEDA Meyer MD   4 mg at 09/29/24 0346    oxyCODONE immediate release tablet 5 mg  5 mg Oral Q4H PRN SEDA Meyer MD   5 mg at 10/02/24 1634    polyethylene glycol packet 17 g  17 g Oral BID PRN Barb Live PA-C        prochlorperazine injection Soln 5 mg  5 mg Intravenous Q6H PRN Barb Live PA-C        QUEtiapine tablet 50 mg  50 mg Oral QHS Barb Live PA-C   50 mg at 10/02/24 2121    simethicone chewable tablet 80 mg  1 tablet Oral QID PRN Barb Live PA-C        sodium chloride 0.9% flush 5 mL  5 mL Intravenous PRN Barb Live PA-C        sucralfate 100 mg/mL suspension 1 g  1 g Oral Q6H Barb Live PA-C   1 g at 10/03/24 0518    ticagrelor tablet 90 mg  90 mg Oral BID Rupal Ochoa MD   90 mg at 10/03/24 0852    vancomycin 125 mg/5 mL oral solution 125 mg  125 mg Oral Q6H Janice Tucker NP   125 mg at 10/03/24 0518        Medication List        START taking  these medications      doxycycline 100 MG tablet  Commonly known as: VIBRA-TABS  Take 1 tablet (100 mg total) by mouth every 12 (twelve) hours. End date 11/7/24     furosemide 40 MG tablet  Commonly known as: LASIX  Take 1 tablet (40 mg total) by mouth 2 (two) times daily.     hydrALAZINE 100 MG tablet  Commonly known as: APRESOLINE  Take 1 tablet (100 mg total) by mouth every 8 (eight) hours.     vancomycin 125 MG capsule  Commonly known as: VANCOCIN  Take 1 capsule by mouth every 6 hours until October 9th then take twice daily until off IV antibiotics (11/7/24) then stop            CHANGE how you take these medications      insulin glargine U-100 (Lantus) 100 unit/mL (3 mL) Inpn pen  Inject 16 Units into the skin every evening.  What changed: how much to take     oxyCODONE 5 MG immediate release tablet  Commonly known as: ROXICODONE  Take 1 tablet (5 mg total) by mouth every 6 (six) hours as needed (pain scale 4-6).  What changed: Another medication with the same name was removed. Continue taking this medication, and follow the directions you see here.     OZEMPIC 1 mg/dose (4 mg/3 mL)  Generic drug: semaglutide  Inject 1 mg into the skin every 7 days. HOLD while at SNF  What changed: Another medication with the same name was removed. Continue taking this medication, and follow the directions you see here.     QUEtiapine 50 MG tablet  Commonly known as: SEROQUEL  Take 1 tablet (50 mg total) by mouth nightly as needed (insomnia).  What changed:   when to take this  reasons to take this            CONTINUE taking these medications      albuterol 90 mcg/actuation inhaler  Commonly known as: PROVENTIL/VENTOLIN HFA  Inhale 2 puffs into the lungs every 6 (six) hours as needed for Wheezing or Shortness of Breath.     aluminum & magnesium hydroxide-simethicone 400-400-40 mg/5 mL suspension  Commonly known as: MYLANTA MAX STRENGTH  Take 30 mLs by mouth every 6 (six) hours as needed for Indigestion.     apixaban 2.5 mg  "Tab  Commonly known as: ELIQUIS  Take 1 tablet (2.5 mg total) by mouth 2 (two) times daily. End date October 26th 2024 for 24 days     aspirin 81 MG Chew  Take 1 tablet (81 mg total) by mouth once daily. Hold to avoid bleed risk on triple therapy and then resume on oct 27 once eliquis stops on oct 26th     DEXCOM G7  Misc  Generic drug: blood-glucose meter,continuous  Use 1  to track blood glucose, ICD10: E11.65     DEXCOM G7 SENSOR Samina  Generic drug: blood-glucose sensor  Use 1 sensor every 10 days to track blood glucose, ICD10: E11.65, okay with 90 day supply if possible     FLUoxetine 40 MG capsule  Take 1 capsule (40 mg total) by mouth once daily.     hydrOXYzine HCL 25 MG tablet  Commonly known as: ATARAX  Take 1 tablet (25 mg total) by mouth 3 (three) times daily as needed for Itching.     insulin aspart U-100 100 unit/mL (3 mL) Inpn pen  Commonly known as: NovoLOG  Inject 0-10 Units into the skin before meals and at bedtime as needed (Hyperglycemia).     isosorbide mononitrate 60 MG 24 hr tablet  Commonly known as: IMDUR  Take 1 tablet (60 mg total) by mouth once daily.     methocarbamoL 500 MG Tab  Commonly known as: ROBAXIN  Take 1 tablet (500 mg total) by mouth 4 (four) times daily.     metoprolol succinate 25 MG 24 hr tablet  Commonly known as: TOPROL-XL  Take 1 tablet (25 mg total) by mouth once daily.     ondansetron 8 MG Tbdl  Commonly known as: ZOFRAN-ODT  Dissolve 1 tablet (8 mg total) by mouth every 8 (eight) hours as needed (nausea).     pen needle, diabetic 32 gauge x 5/32" Ndle  Commonly known as: BD ULTRA-FINE SAGAR PEN NEEDLE  One pen needle use with insulin pen 4 times a day.  ICD-10: E11.9     ticagrelor 90 mg tablet  Commonly known as: BRILINTA  Take 1 tablet (90 mg total) by mouth 2 (two) times daily.            STOP taking these medications      acetaminophen 500 MG tablet  Commonly known as: TYLENOL     atorvastatin 80 MG tablet  Commonly known as: LIPITOR     CALCIUM " ANTACID 300 mg (750 mg) chewable tablet  Generic drug: calcium carbonate     divalproex 125 mg capsule  Commonly known as: DEPAKOTE SPRINKLES     melatonin 3 mg tablet  Commonly known as: MELATIN     pantoprazole 40 MG tablet  Commonly known as: PROTONIX     polyethylene glycol 17 gram Pwpk  Commonly known as: GLYCOLAX                I have seen and examined this patient within the last 30 days. My clinical findings that support the need for the home health skilled services and home bound status are the following:no   Weakness/numbness causing balance and gait disturbance due to Fracture, Infection, and Weakness/Debility making it taxing to leave home.  Requiring assistive device to leave home due to unsteady gait caused by  Fracture, Infection, and Weakness/Debility.     Diet:   diabetic diet 2000 calorie      Referrals/ Consults  Physical Therapy to evaluate and treat. Evaluate for home safety and equipment needs; Establish/upgrade home exercise program. Perform / instruct on therapeutic exercises, gait training, transfer training, and Range of Motion.  Occupational Therapy to evaluate and treat. Evaluate home environment for safety and equipment needs. Perform/Instruct on transfers, ADL training, ROM, and therapeutic exercises.  Aide to provide assistance with personal care, ADLs, and vital signs.    Activities:   Non weight bearing to right lower extremity at least until; 11/1/2024.     Nursing:   Agency to admit patient within 24 hours of hospital discharge unless specified on physician order or at patient request    SN to complete comprehensive assessment including routine vital signs. Instruct on disease process and s/s of complications to report to MD. Review/verify medication list sent home with the patient at time of discharge  and instruct patient/caregiver as needed. Frequency may be adjusted depending on start of care date.     Skilled nurse to perform up to 3 visits PRN for symptoms related to  diagnosis    Notify MD if SBP > 160 or < 90; DBP > 90 or < 50; HR > 120 or < 50; Temp > 101; O2 < 88%; Other:       Ok to schedule additional visits based on staff availability and patient request on consecutive days within the home health episode.    When multiple disciplines ordered:    Start of Care occurs on Sunday - Wednesday schedule remaining discipline evaluations as ordered on separate consecutive days following the start of care.    Thursday SOC -schedule subsequent evaluations Friday and Monday the following week.     Friday - Saturday SOC - schedule subsequent discipline evaluations on consecutive days starting Monday of the following week.    For all post-discharge communication and subsequent orders please contact patient's primary care physician. If unable to reach primary care physician or do not receive response within 30 minutes, [facility] staff order clarification    Miscellaneous   Routine Skin for Bedridden Patients: Instruct patient/caregiver to apply moisture barrier cream to all skin folds and wet areas in perineal area daily and after baths and all bowel movements.  Diabetic Care:   SN to perform and educate Diabetic management with blood glucose monitoring:, Fingerstick blood sugar AC and HS, and Report CBG < 60 or > 350 to physician.    Home Health Aide:  Nursing Three times weekly, Physical Therapy Three times weekly, and Occupational Therapy Three times weekly    Wound Care Orders  Keep prevena wound vac on for 1 week to RLE until Ortho removes at clinic follow up  The breast folds are pink and moist - intertrigo. Barrier cream applied. Pt educated on the wound care and the importance of turning every 2 hours.     Recommendations:  - Breast folds: bedside nursing to cleanse with bath wipes, pat dry and apply barrier cream (orange top) bid/prn         I certify that this patient is confined to her home and needs intermittent skilled nursing care,  physical therapy, and occupational therapy.    HUNTER BARROSO MD  Attending Staff Physician   Department of [facility] Medicine, [facility] on [facility]  Pager: [phone]  Spectralink: [id number]

## 2024-09-30 NOTE — PROGRESS NOTES
[facility]  Orthopedics  Progress Note    Patient Name: Lorena Contreras  MRN: [id number]  Admission Date: 9/24/2024  Hospital Length of Stay: 5 days  Attending Provider: Rupal Ochoa MD  Primary Care Provider: Jorge Paris MD  Follow-up For: Procedure(s) (LRB):  IRRIGATION AND DEBRIDEMENT, LOWER EXTREMITY; slider table, supine, bone foam, cysto tubing, 6L NS/dakins/peroxide, culture swabs (Right)  APPLICATION, WOUND VAC (Right)  DEBRIDEMENT, LOCAL FLAP (Right)    Post-Operative Day: 5 Days Post-Op  Subjective:     Principal Problem:Wound dehiscence    Principal Orthopedic Problem: as above, s/p I&D right ankle 9/25    Interval History: Patient seen and examined at bedside. NAEON. VSS, afebrile. Did well with PT today sit to supine with minimum assistance. Final ID recs in - pending resolution of diarrhea and C. Diff potentially dc home with HH tomorrow vs Wed        Review of patient's allergies indicates:   Allergen Reactions    Novolin 70/30 (semi-synthetic) Nausea And Vomiting     Severe vomiting on Relion 70/30    Sulfa (sulfonamide antibiotics) Anaphylaxis    Talwin [pentazocine lactate] Anaphylaxis    Victoza [liraglutide] Nausea And Vomiting    Glipizide Nausea Only    Citric acid     Codeine     Influenza virus vaccines Hives    Iodine and iodide containing products Hives    Levetiracetam Itching    Neurontin [gabapentin]      Possible associated myoclonic jerk    Rituxan [rituximab] Hives    Zoloft [sertraline] Nausea And Vomiting       Current Facility-Administered Medications   Medication    0.9%  NaCl infusion (for blood administration)    albuterol-ipratropium 2.5 mg-0.5 mg/3 mL nebulizer solution 3 mL    aluminum-magnesium hydroxide-simethicone 200-200-20 mg/5 mL suspension 30 mL    aluminum-magnesium hydroxide-simethicone 200-200-20 mg/5 mL suspension 30 mL    apixaban tablet 2.5 mg    cefTRIAXone (ROCEPHIN) 2 g in D5W 100 mL IVPB (MB+)    DAPTOmycin (CUBICIN) 685 mg in 0.9%  "NaCl SolP 50 mL IVPB    dextrose 10% bolus 125 mL 125 mL    dextrose 10% bolus 250 mL 250 mL    FLUoxetine capsule 40 mg    furosemide tablet 20 mg    glucagon (human recombinant) injection 1 mg    glucose chewable tablet 16 g    glucose chewable tablet 24 g    hydrALAZINE tablet 100 mg    hydrocortisone 2.5 % rectal cream    hydrOXYzine HCL tablet 25 mg    insulin aspart U-100 pen 0-5 Units    insulin glargine U-100 (Lantus) pen 16 Units    isosorbide mononitrate 24 hr tablet 60 mg    melatonin tablet 6 mg    methocarbamoL tablet 500 mg    metoprolol succinate (TOPROL-XL) 24 hr tablet 25 mg    naloxone 0.4 mg/mL injection 0.02 mg    ondansetron tablet 4 mg    oxyCODONE immediate release tablet 5 mg    polyethylene glycol packet 17 g    prochlorperazine injection Soln 5 mg    QUEtiapine tablet 50 mg    simethicone chewable tablet 80 mg    sodium chloride 0.9% flush 5 mL    sucralfate 100 mg/mL suspension 1 g    ticagrelor tablet 90 mg    vancomycin 125 mg/5 mL oral solution 125 mg     Objective:     Vital Signs (Most Recent):  Temp: 98.8 °F (37.1 °C) (09/30/24 1548)  Pulse: 103 (09/30/24 1548)  Resp: 19 (09/30/24 1548)  BP: (!) 164/67 (09/30/24 1548)  SpO2: 99 % (09/30/24 1548) Vital Signs (24h Range):  Temp:  [97.8 °F (36.6 °C)-98.8 °F (37.1 °C)] 98.8 °F (37.1 °C)  Pulse:  [] 103  Resp:  [15-19] 19  SpO2:  [95 %-99 %] 99 %  BP: (116-166)/(56-71) 164/67     Weight: 85.4 kg (188 lb 4.4 oz)  Height: 5' 10" (177.8 cm)  Body mass index is 27.01 kg/m².      Intake/Output Summary (Last 24 hours) at 9/30/2024 1705  Last data filed at 9/30/2024 0138  Gross per 24 hour   Intake 155.82 ml   Output 0 ml   Net 155.82 ml        Ortho/SPM Exam  A&O x 3  Regular Rate  Non-Labored Respirations    RLE:  Wound vac with good seal  Ace wrap in place  Fires Quad/TA/EHL/GSC  SILT  Compartments soft  Brisk cap refill         Significant Labs: All pertinent labs within the past 24 hours have been reviewed.    Significant Imaging: I " have reviewed all pertinent imaging results/findings.  Assessment/Plan:     * Wound dehiscence, right ankle  Lorena Contreras is a 73 y.o. female s/p ORIF right pilon fx presents with right medial ankle wound dehiscence s/p I&D R ankle 9/25/24.    Plan:  - Admitted to    - Abx: ID recs for dapto and rocephin until 11/7/24  - ID consulted, appreciate recs  - Cultures NGTD  - NWB RLE x 10 weeks from date of ORIF (8/14/24)  - Prevena at 75mmHg, f/u 1 week from discharge for removal            Manas Parks MD  Orthopedics  [facility]

## 2024-09-30 NOTE — ASSESSMENT & PLAN NOTE
+ after copious diarrhea on 9/28 and oral vanc started, lomotil stpped due to toxic megacolon risk with anti-diarrhea meds  -based on history, was present on admit as the diarrehea started the day she was admitted, and was just at SNF the last month and exposure appears to be from there.  -per ID - cont full dose therapy 4 x a day oral vanc for 10 adys then do BID while on antibiotics until complete 11/7, calculated comes out to 108 pills

## 2024-09-30 NOTE — PLAN OF CARE
Problem: Adult Inpatient Plan of Care  Goal: Plan of Care Review  Outcome: Progressing  Goal: Optimal Comfort and Wellbeing  Intervention: Monitor Pain and Promote Comfort  Flowsheets (Taken 9/30/2024 0208)  Pain Management Interventions:   medication offered   pillow support provided  Intervention: Provide Person-Centered Care  Flowsheets (Taken 9/30/2024 0208)  Trust Relationship/Rapport:   questions answered   care explained   choices provided   emotional support provided   empathic listening provided   questions encouraged   reassurance provided   thoughts/feelings acknowledged     Problem: Adult Inpatient Plan of Care  Goal: Plan of Care Review  Outcome: Progressing     Problem: Adult Inpatient Plan of Care  Goal: Optimal Comfort and Wellbeing  Intervention: Monitor Pain and Promote Comfort  Flowsheets (Taken 9/30/2024 0208)  Pain Management Interventions:   medication offered   pillow support provided  Intervention: Provide Person-Centered Care  Flowsheets (Taken 9/30/2024 0208)  Trust Relationship/Rapport:   questions answered   care explained   choices provided   emotional support provided   empathic listening provided   questions encouraged   reassurance provided   thoughts/feelings acknowledged     Problem: Adult Inpatient Plan of Care  Goal: Optimal Comfort and Wellbeing  Intervention: Monitor Pain and Promote Comfort  Flowsheets (Taken 9/30/2024 0208)  Pain Management Interventions:   medication offered   pillow support provided     Problem: Adult Inpatient Plan of Care  Goal: Optimal Comfort and Wellbeing  Intervention: Provide Person-Centered Care  Flowsheets (Taken 9/30/2024 0208)  Trust Relationship/Rapport:   questions answered   care explained   choices provided   emotional support provided   empathic listening provided   questions encouraged   reassurance provided   thoughts/feelings acknowledged  PRN medication effective for anxiety . Explained plan of care, verbalized understanding stated she  feels anxious . Educated pt .on new medicine . No injury during shift, Side rails up x 2, call light by bedside.  Applied skin barrier cream to perineum multiple soft BM this shift  incontinent x2

## 2024-09-30 NOTE — PT/OT/SLP PROGRESS
"Occupational Therapy   Treatment    Name: Lorena Contreras  MRN: [id number]  Admitting Diagnosis:  Wound dehiscence  5 Days Post-Op    Recommendations:     Discharge Recommendations: Moderate Intensity Therapy  Discharge Equipment Recommendations:  none  Barriers to discharge:   (increased assistance needed)    Assessment:     Lorena Contreras is a 73 y.o. female with a medical diagnosis of Wound dehiscence.  She presents with being soiled in bed. Performance deficits affecting function are weakness, impaired endurance, impaired functional mobility, gait instability, impaired balance, decreased lower extremity function, pain, orthopedic precautions.     Rehab Prognosis:  Good; patient would benefit from acute skilled OT services to address these deficits and reach maximum level of function.       Plan:     Patient to be seen 4 x/week to address the above listed problems via self-care/home management, therapeutic activities, therapeutic exercises, neuromuscular re-education  Plan of Care Expires: 10/26/24  Plan of Care Reviewed with: patient    Subjective     Chief Complaint: " I'm a Mess"  Patient/Family Comments/goals: return home  Pain/Comfort:  Pain Rating 1: 0/10    Objective:     Communicated with: Nsg prior to session.  Patient found supine with wound vac, PureWick, peripheral IV upon OT entry to room.    General Precautions: Standard, fall    Orthopedic Precautions:RLE non weight bearing  Braces: N/A  Respiratory Status: Nasal cannula, flow 2 L/min     Occupational Performance:     Bed Mobility:    Patient completed Rolling/Turning to Left with  stand by assistance  Patient completed Rolling/Turning to Right with contact guard assistance and minimum assistance to assist with LE    Functional Mobility/Transfers:  Functional Mobility: NT 2/2 to pt being soiled    Activities of Daily Living:  Upper Body Dressing: minimum assistance don new gown; sba to doff soiled gown  Toileting: maximal assistance jonathan " care and clothing management      [facility] 6 Click ADL: 17    Treatment & Education:  Pt educated on role and purpose of therapy  Pt educated on goal setting  Pt educated on benefits of OOB activity  Pt educated on self advocacy  Pt educated and demonstrated understanding of LE precautions    Patient left HOB elevated with all lines intact, call button in reach, and nsg notified    GOALS:   Multidisciplinary Problems       Occupational Therapy Goals          Problem: Occupational Therapy    Goal Priority Disciplines Outcome Interventions   Occupational Therapy Goal     OT, PT/OT Progressing    Description: Goals to be met by: 10/26/24     Patient will increase functional independence with ADLs by performing:    LE Dressing with Supervision.  Grooming while standing at sink with Supervision.  T/f with slideboard with CGA  Toileting from toilet with Supervision for hygiene and clothing management.   Supine to sit with Supervision.                         Time Tracking:     OT Date of Treatment: 09/30/24  OT Start Time: 1337  OT Stop Time: 1402  OT Total Time (min): 25 min    Billable Minutes:Self Care/Home Management 25 9/30/2024

## 2024-09-30 NOTE — PLAN OF CARE
Phoned patient daughter Noelle Contreras to discuss Discharge plan. Discussed purwic, patient dc on oral antibiotics, and HH services. She is in agreement with dc plan.

## 2024-09-30 NOTE — ASSESSMENT & PLAN NOTE
Patient is identified as having Diastolic (HFpEF) heart failure that is Acute on chronic. CHF is currently uncontrolled due to Pulmonary edema/pleural effusion on CXR. CXR on admit with mild pulmonary congestion and BNP elevated at 1373 on admit. Patient with mild excerebration. Latest ECHO performed and demonstrates- Results for orders placed during the hospital encounter of 11/03/23    Echo    Interpretation Summary    Left Ventricle: The left ventricle is normal in size. Increased wall thickness. Moderate septal thickening. Normal wall motion. There is normal systolic function with a visually estimated ejection fraction of 65 - 70%. Grade I diastolic dysfunction.    Right Ventricle: Normal right ventricular cavity size. Systolic function is normal.    Left Atrium: Left atrium is severely dilated.    Aortic Valve: There is moderate stenosis. Aortic valve area by VTI is 1.02 cm². Aortic valve peak velocity is 2.59 m/s. Mean gradient is 18 mmHg. The dimensionless index is 0.32.    Mitral Valve: There is mild regurgitation.    Tricuspid Valve: There is mild regurgitation.    Pulmonary Artery: The estimated pulmonary artery systolic pressure is 35 mmHg.    IVC/SVC: Normal venous pressure at 3 mmHg.    - started on IV Lasix 40 mg BID to treat mild heart failure. Excerebration very mild so okay to proceed with surgery as patient not hypoxic and not having any respiratory distress. Watch given mild cr high limit normal on admit but improving 9/26.  - Continue home Imdur and Toprol XL for chronic heart failure and monitor clinical status closely. Monitor on telemetry.   - Patient is off CHF pathway.    - Monitor strict Is&Os and daily weights.    - Place on fluid restriction of 1.5 L. Cardiology has not been consulted.   - Continue to stress to patient importance of self efficacy and  on diet for CHF.   - Last BNP reviewed- and noted below   Recent Labs   Lab 09/25/24  0248   BNP 1,373*     Still on oxygen 927 with  signs of mild overload so keep on IV for now and off oxygen 9/29 but reports some subjective dyspnea at rest and when laying flat still so continue and reassess tomorrow for oral conversion  Euvolemic appearing 9/30 with inc bicarb now so will go to oral lasix, go to 20 BID as was not on any prevoiusly and was on 40 IV BID now so scale down and plan to cont on dc

## 2024-09-30 NOTE — SUBJECTIVE & OBJECTIVE
Principal Problem:Wound dehiscence    Principal Orthopedic Problem: as above, s/p I&D right ankle 9/25    Interval History: Patient seen and examined at bedside. NAEON. VSS, afebrile. Did well with PT today sit to supine with minimum assistance. Final ID recs in - pending resolution of diarrhea and C. Diff potentially dc home with HH tomorrow vs Wed        Review of patient's allergies indicates:   Allergen Reactions    Novolin 70/30 (semi-synthetic) Nausea And Vomiting     Severe vomiting on Relion 70/30    Sulfa (sulfonamide antibiotics) Anaphylaxis    Talwin [pentazocine lactate] Anaphylaxis    Victoza [liraglutide] Nausea And Vomiting    Glipizide Nausea Only    Citric acid     Codeine     Influenza virus vaccines Hives    Iodine and iodide containing products Hives    Levetiracetam Itching    Neurontin [gabapentin]      Possible associated myoclonic jerk    Rituxan [rituximab] Hives    Zoloft [sertraline] Nausea And Vomiting       Current Facility-Administered Medications   Medication    0.9%  NaCl infusion (for blood administration)    albuterol-ipratropium 2.5 mg-0.5 mg/3 mL nebulizer solution 3 mL    aluminum-magnesium hydroxide-simethicone 200-200-20 mg/5 mL suspension 30 mL    aluminum-magnesium hydroxide-simethicone 200-200-20 mg/5 mL suspension 30 mL    apixaban tablet 2.5 mg    cefTRIAXone (ROCEPHIN) 2 g in D5W 100 mL IVPB (MB+)    DAPTOmycin (CUBICIN) 685 mg in 0.9% NaCl SolP 50 mL IVPB    dextrose 10% bolus 125 mL 125 mL    dextrose 10% bolus 250 mL 250 mL    FLUoxetine capsule 40 mg    furosemide tablet 20 mg    glucagon (human recombinant) injection 1 mg    glucose chewable tablet 16 g    glucose chewable tablet 24 g    hydrALAZINE tablet 100 mg    hydrocortisone 2.5 % rectal cream    hydrOXYzine HCL tablet 25 mg    insulin aspart U-100 pen 0-5 Units    insulin glargine U-100 (Lantus) pen 16 Units    isosorbide mononitrate 24 hr tablet 60 mg    melatonin tablet 6 mg    methocarbamoL tablet 500 mg     "metoprolol succinate (TOPROL-XL) 24 hr tablet 25 mg    naloxone 0.4 mg/mL injection 0.02 mg    ondansetron tablet 4 mg    oxyCODONE immediate release tablet 5 mg    polyethylene glycol packet 17 g    prochlorperazine injection Soln 5 mg    QUEtiapine tablet 50 mg    simethicone chewable tablet 80 mg    sodium chloride 0.9% flush 5 mL    sucralfate 100 mg/mL suspension 1 g    ticagrelor tablet 90 mg    vancomycin 125 mg/5 mL oral solution 125 mg     Objective:     Vital Signs (Most Recent):  Temp: 98.8 °F (37.1 °C) (09/30/24 1548)  Pulse: 103 (09/30/24 1548)  Resp: 19 (09/30/24 1548)  BP: (!) 164/67 (09/30/24 1548)  SpO2: 99 % (09/30/24 1548) Vital Signs (24h Range):  Temp:  [97.8 °F (36.6 °C)-98.8 °F (37.1 °C)] 98.8 °F (37.1 °C)  Pulse:  [] 103  Resp:  [15-19] 19  SpO2:  [95 %-99 %] 99 %  BP: (116-166)/(56-71) 164/67     Weight: 85.4 kg (188 lb 4.4 oz)  Height: 5' 10" (177.8 cm)  Body mass index is 27.01 kg/m².      Intake/Output Summary (Last 24 hours) at 9/30/2024 1705  Last data filed at 9/30/2024 0138  Gross per 24 hour   Intake 155.82 ml   Output 0 ml   Net 155.82 ml        Ortho/SPM Exam  A&O x 3  Regular Rate  Non-Labored Respirations    RLE:  Wound vac with good seal  Ace wrap in place  Fires Quad/TA/EHL/GSC  SILT  Compartments soft  Brisk cap refill         Significant Labs: All pertinent labs within the past 24 hours have been reviewed.    Significant Imaging: I have reviewed all pertinent imaging results/findings.  "

## 2024-09-30 NOTE — CONSULTS
[facility]  Wound Care    Patient Name:  Lorena Contreras   MRN:  [id number]  Date: 9/30/2024  Diagnosis: Wound dehiscence    History:     Past Medical History:   Diagnosis Date    Allergy     Altered mental status 06/19/2022    DYSARTHRIA, SPASTIC MOVEMENTS & DIFFICULTY SWALLOWING    Anemia     Anxiety     Arthritis     Cataract     both removed    Colon polyps     Coronary artery disease     Depression     Diabetes mellitus, type II     Disorder of kidney and ureter     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/2023    Fibromyalgia     Follicular lymphoma     GERD (gastroesophageal reflux disease)     HTN (hypertension)     Hyperlipidemia     MI (myocardial infarction) 03/2019    Personal history of colonic polyps     Restless leg syndrome     Stroke        Social History     Socioeconomic History    Marital status:     Number of children: 2   Occupational History    Occupation: house wife    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  2 dtr.  Lives with .  3 cats and a dog.  Retired.  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  Lives in house, 1 story and 4 steps up and has a ramp.      Enjoys crafting.  Unable to bowl due to myalgias.       Social Drivers of Health     Financial Resource Strain: Low Risk  (8/14/2024)    Overall Financial Resource Strain (CARDIA)     Difficulty of Paying Living Expenses: Not hard at all   Food Insecurity: No Food Insecurity (8/14/2024)    Hunger Vital Sign     Worried About Running Out of Food in the Last Year: Never true     Ran Out of Food in the Last Year: Never true   Transportation Needs: No Transportation Needs (8/14/2024)    TRANSPORTATION NEEDS     Transportation : No   Physical Activity: Inactive (8/14/2024)    Exercise  Vital Sign     Days of Exercise per Week: 0 days     Minutes of Exercise per Session: 0 min   Stress: No Stress Concern Present (8/14/2024)    [employer] [place] [employer] - Occupational Stress Questionnaire     Feeling of Stress : Not at all   Housing Stability: Low Risk  (8/14/2024)    Housing Stability Vital Sign     Unable to Pay for Housing in the Last Year: No     Homeless in the Last Year: No       Precautions:     Allergies as of 09/24/2024 - Reviewed 09/24/2024   Allergen Reaction Noted    Novolin 70/30 (semi-synthetic) Nausea And Vomiting 07/18/2016    Sulfa (sulfonamide antibiotics) Anaphylaxis 01/23/2014    Talwin [pentazocine lactate] Anaphylaxis 01/23/2014    Victoza [liraglutide] Nausea And Vomiting 07/30/2015    Glipizide Nausea Only 11/04/2016    Citric acid  01/23/2014    Codeine  01/23/2014    Influenza virus vaccines Hives 06/01/2021    Iodine and iodide containing products Hives 10/25/2017    Levetiracetam Itching 04/28/2023    Neurontin [gabapentin]  08/20/2024    Rituxan [rituximab] Hives 01/23/2014    Zoloft [sertraline] Nausea And Vomiting 01/23/2014       [facility] Assessment Details/Treatment   Patient seen for wound care consultation.  Chart reviewed for this encounter.  See flow sheet for findings.    Pt in bed and agreeable to care. The breast folds are pink and moist - intertrigo. Barrier cream applied. Pt educated on the wound care and the importance of turning every 2 hours.    Recommendations:  - Breast folds: bedside nursing to cleanse with bath wipes, pat dry and apply barrier cream (orange top) bid/prn     09/30/24 1018        Wound 09/24/24 2304 Intertrigo Right lateral;distal Breast #1   Date First Assessed/Time First Assessed: 09/24/24 2304   Present on Original Admission: Yes  Primary Wound Type: Intertrigo  Side: Right  Orientation: lateral;distal  Location: Breast  Wound Number: #1   Dressing Appearance Open to air   Drainage Amount Scant   Appearance Pink;Moist    Tissue loss description Partial thickness   Periwound Area Intact;Moist        Wound 09/24/24 2305 Intertrigo Left posterior Breast #2   Date First Assessed/Time First Assessed: 09/24/24 2305   Present on Original Admission: Yes  Primary Wound Type: Intertrigo  Side: Left  Orientation: posterior  Location: Breast  Wound Number: #2   Dressing Appearance Open to air   Drainage Amount Scant   Appearance Pink;Moist   Tissue loss description Partial thickness   Periwound Area Intact;Moist          09/30/2024

## 2024-09-30 NOTE — ASSESSMENT & PLAN NOTE
Lorena Contreras is a 73 y.o. female s/p ORIF right pilon fx presents with right medial ankle wound dehiscence s/p I&D R ankle 9/25/24.    Plan:  - Admitted to    - Abx: ID recs for dapto and rocephin until 11/7/24  - ID consulted, appreciate recs  - Cultures NGTD  - NWB RLE x 10 weeks from date of ORIF (8/14/24)  - Prevena at 75mmHg, f/u 1 week from discharge for removal

## 2024-09-30 NOTE — ASSESSMENT & PLAN NOTE
Closed right pilon fracture s/p ORIF on 8/14/2024  74 yo female with history of a fall resulting in a pilon fracture to the right ankle on 8/14/24 s/p ORIF. Sutures removed on 09/12/2024 in Ortho clinic. Presented to hospital for swelling and wound dehiscence to right ankle. No leukocytosis. CRP 9.3 and ESR 49. X-ray of right ankle on admit showed Orthopedic hardware intact with no acute fracture or soft tissue swelling.    - Orthopedics consulted in ED:       - Wound irrigated and soft dressings applied in ED.  - Patient non weight bearing to right lower extremity as per Ortho.   - Multimodal pain regimen. Avoid Tylenol as AST and ALT elevated. Oxycodone IR po prn for breakthrough pain.   - Plan for irrigation and debridement of right ankle wound in OR today (9/25/24) with Dr. Davalos and Orthopedics with vac placed and full op note still pending from 9/25.  -vanc/rocephin for coverage with serosanginous drainage reported and ortho note reports ID consulted but they were not so consulted 9/27 given ortho feels concern for infectious given this with hardware exposed. ID changed to dapto/rocephin on 9/28 and appreciate assistance. Rocephin stopped 9/28 per ID  Final ID recs- [email or web address] until 11/7 with weekly labs, PICC.   -NWB until at least 11/1 per ortho-   -prevena wound vac for a week  -was on eliquis until oct 26th post pelvic/ankel fx for ppx, okay to resume per ortho and resumed 9/28  Rec for SNF but she declines as out of days and misses her cats and had bad experience. Family aware

## 2024-09-30 NOTE — SUBJECTIVE & OBJECTIVE
Interval History: she reports feeling very weak today from prev stool losses and the kitchen isnt bringing very much food for her, she said they brought only a few pieces of bob and some toast today and they didn't switch to boost breeze like ordered yesterday as cannot tolerate the milk based right now with c diff diarrhea. She said diarrhea slowing down and only 1 BM since last night. Cange to PO lasix today as bicarb increased and appears euvolemic now and was not on before so changed to 20 BID as was on 40 IV now. She reports SNF did send her home with new meds like eilquis, bong meds, etc, as wanted clarissa giselle sure she had the new meds she was supposed to be on that we started last admit here and were continued when she left [facility] the day before readmit here. Cr 1.7 and within baseline ranges, hg 8.7. final ID recs for dapto/rocephin until 11/7 and vanc full dose for 10 days 4x a day and then BID while on antibiotics until 11/7, so calculated amount of pills for this and came out to 108 and sent this + lasix new meds to pharmacy now. Order for PICC to place today. Will plan for dc possibly tomrorow as still weak and want to assess how does on oral lasix transition.prevena x 1 week per ortho. Will need her and daughter to have antibiotic teaching as well. Have equipment for home and she prefers  and does not want SNF andfamily aware and help her out at home as well.  CM updated on plans and will work on home infusion teaching once PICC placed with [facility] pharmacists. Patient aware no dc today but possible tomorrow v wed pending how things are going    Review of Systems   Constitutional:  Negative for chills and fever.   Respiratory:  Negative for cough and shortness of breath.    Cardiovascular:  Negative for chest pain, palpitations and leg swelling.   Gastrointestinal:  Negative for abdominal pain, nausea and vomiting.   Genitourinary:  Negative for difficulty urinating.   Musculoskeletal:   Negative for arthralgias, joint swelling and myalgias.   Skin:  Positive for wound (Right ankle surgical wound).   Neurological:  Positive for weakness (Generalized). Negative for dizziness and light-headedness.   Psychiatric/Behavioral:  Negative for confusion and hallucinations.      Objective:     Vital Signs (Most Recent):  Temp: 97.8 °F (36.6 °C) (09/25/24 0731)  Pulse: 97 (09/25/24 1122)  Resp: 18 (09/25/24 0731)  BP: 157/69 (09/25/24 0731)  SpO2: 93 % (09/25/24 0731) on room air Vital Signs (24h Range):  Temp:  [97.8 °F (36.6 °C)-99 °F (37.2 °C)] 97.8 °F (36.6 °C)  Pulse:  [] 87  Resp:  [15-18] 17  SpO2:  [93 %-99 %] 95 %  BP: (116-144)/(49-65) 144/65     Weight: 85.4 kg (188 lb 4.4 oz)  Body mass index is 27.01 kg/m².    Intake/Output Summary (Last 24 hours) at 9/30/2024 1016  Last data filed at 9/30/2024 0138  Gross per 24 hour   Intake 155.82 ml   Output 0 ml   Net 155.82 ml         Physical Exam  Vitals and nursing note reviewed.   Constitutional:       General: She is awake. She is not in acute distress.     Appearance: Normal appearance. She is normal weight. She is not ill-appearing.      Comments: . Patient awake and alert and oriented x 4.    Eyes:      Conjunctiva/sclera: Conjunctivae normal.   Cardiovascular:      Rate and Rhythm: Normal rate and regular rhythm.      Heart sounds: Normal heart sounds. No murmur heard.     No friction rub. No gallop.   Pulmonary:      Effort: Pulmonary effort is normal. No respiratory distress.      Breath sounds: Normal breath sounds. No wheezing.      Comments: On 1 L oxygen, no dyspnea  Abdominal:      General: Abdomen is flat. Bowel sounds are normal. There is no distension.      Palpations: Abdomen is soft.      Tenderness: There is no abdominal tenderness.   Musculoskeletal:      Right lower leg: No edema.      Left lower leg: No edema.      Comments: Right ankle wound vac   Skin:     General: Skin is warm.      Findings: No erythema.      Comments:  "Bluish coloration to groin areas related to application of Gentian violet   Neurological:      Mental Status: She is alert and oriented to person, place, and time.   Psychiatric:         Mood and Affect: Mood normal.         Behavior: Behavior normal. Behavior is cooperative.         Thought Content: Thought content normal.         Judgment: Judgment normal.             Significant Labs: A1C:   Recent Labs   Lab 07/10/24  1105   HGBA1C 8.2*     CBC:   Recent Labs   Lab 09/29/24 0353 09/30/24 0227   WBC 12.96* 14.77*   HGB 10.2* 8.7*   HCT 32.7* 28.5*    262     CMP:   Recent Labs   Lab 09/29/24 0353 09/30/24 0227   * 136   K 3.4* 3.5   CL 94* 97   CO2 30* 31*   * 109   BUN 25* 26*   CREATININE 1.5* 1.7*   CALCIUM 8.7 8.4*   PROT 6.2 5.7*   ALBUMIN 2.3* 2.1*   BILITOT 0.4 0.3   ALKPHOS 303* 245*   AST 42* 34   ALT 42 32   ANIONGAP 11 8     Cardiac Markers:   No results for input(s): "CKMB", "MYOGLOBIN", "BNP", "TROPISTAT" in the last 48 hours.      Magnesium:   Recent Labs   Lab 09/29/24 0353 09/30/24 0227   MG 2.2 2.3     POCT Glucose:   Recent Labs   Lab 09/29/24  1434 09/29/24  2051 09/30/24  0735   POCTGLUCOSE 188* 156* 91     Urine Studies:   No results for input(s): "COLORU", "APPEARANCEUA", "PHUR", "SPECGRAV", "PROTEINUA", "GLUCUA", "KETONESU", "BILIRUBINUA", "OCCULTUA", "NITRITE", "UROBILINOGEN", "LEUKOCYTESUR", "RBCUA", "WBCUA", "BACTERIA", "SQUAMEPITHEL", "HYALINECASTS" in the last 48 hours.    Invalid input(s): "WRIGHTSUR"      Significant Imaging: I have reviewed all pertinent imaging results/findings within the past 24 hours.  "

## 2024-09-30 NOTE — NURSING TRANSFER
Nurses Note -- 4 Eyes      9/30/2024   4:40 AM      Skin assessed during: Q Shift Change      [] No Altered Skin Integrity Present    []Prevention Measures Documented      [x] Yes- Altered Skin Integrity Present or Discovered   [] LDA Added if Not in Epic (Describe Wound)   [] New Altered Skin Integrity was Present on Admit and Documented in LDA   [] Wound Image Taken    Wound Care Consulted? No    Attending Nurse:  Elise Shannon RN/Staff Member:   Yoselin  Redness to labia and sacrum

## 2024-09-30 NOTE — PROGRESS NOTES
"[facility] Medicine  Progress Note    Patient Name: Lorena Contreras  MRN: [id number]  Patient Class: IP- Inpatient   Admission Date: 9/24/2024  Length of Stay: 5 days  Attending Physician: Rupal Ohcoa MD  Primary Care Provider: Jorge Paris MD        Subjective:     Principal Problem:Wound dehiscence        HPI:  Lorena Contreras is a 74 yo F with PMHx of  DM2, Fibromyalgia, lymphoma s/p chemo, HTN, HLD, CVA, MI, CAD s/p PCIs, CKD3b, HFpEF, and anemia who presented to ED for R ankle wound dehiscence. The patient reports that on 8/14/24, she was walking into a [employer] when she lost her balance, fell, and twisted her ankle. She sustained a pelvic fracture and a right pilon fracture, requiring surgery at [facility] later that day. Following the fall, she experienced urinary retention and had an indwelling catheter placed. While at the SNF, the patient reports significant progress, noting that her injuries were healing well, and she regained some mobility. Her wound was evaluated, and sutures were removed on 9/12/24. The patient was discharged home four days ago but describes her experience as "miserable," citing a lack of mobility aids provided for home use. At SNF, she had not been bearing weight on her RLE. She reports while attempting to stand on her RLE with home health her injury opened up. Since then, she has had increased swelling to her R ankle and associated generalized weakness and fatigue, decreased appetite, constipation, SOB (when anxious) and nausea. She denies fevers, chills, rhinorrhea, sore throat, cough, hemoptysis, chest pain, vomiting, diarrhea, or blood in her stool.     ED Course: AFVSS. CBC significant hgb 7.9 (down from 11.8 on month ago). No leukocytosis. CMP with Cr 2.0 (at baseline), transaminitis , , and . BG initially 232, but now 91. CRP 93 and ESR 49. Lactate 0.91. R ankle XR with stable hardware and no acute fx or dislocation, but " concerning for cellulitis. Ortho consulted and planning for I&D in AM. Given IV zofran. Placed in observation with HM.       Overview/Hospital Course:  Orthopedics consulted and patient being taken to OR today for incision and drainage and washout of right ankle surgical site for wound dehiscence. Patient with no obvious clinical signs of infection.     Interval History: she reports feeling very weak today from prev stool losses and the kitchen isnt bringing very much food for her, she said they brought only a few pieces of bob and some toast today and they didn't switch to boost breeze like ordered yesterday as cannot tolerate the milk based right now with c diff diarrhea. She said diarrhea slowing down and only 1 BM since last night. Cange to PO lasix today as bicarb increased and appears euvolemic now and was not on before so changed to 20 BID as was on 40 IV now. She reports SNF did send her home with new meds like eilquis, bong meds, etc, as wanted clarissa desai sure she had the new meds she was supposed to be on that we started last admit here and were continued when she left [facility] the day before readmit here. Cr 1.7 and within baseline ranges, hg 8.7. final ID recs for dapto/rocephin until 11/7 and vanc full dose for 10 days 4x a day and then BID while on antibiotics until 11/7, so calculated amount of pills for this and came out to 108 and sent this + lasix new meds to pharmacy now. Order for PICC to place today. Will plan for dc possibly tomrorow as still weak and want to assess how does on oral lasix transition.prevena x 1 week per ortho. Will need her and daughter to have antibiotic teaching as well. Have equipment for home and she prefers  and does not want SNF andfamily aware and help her out at home as well.  CM updated on plans and will work on home infusion teaching once PICC placed with [facility] pharmacists. Patient aware no dc today but possible tomorrow v wed pending how things are  going    Review of Systems   Constitutional:  Negative for chills and fever.   Respiratory:  Negative for cough and shortness of breath.    Cardiovascular:  Negative for chest pain, palpitations and leg swelling.   Gastrointestinal:  Negative for abdominal pain, nausea and vomiting.   Genitourinary:  Negative for difficulty urinating.   Musculoskeletal:  Negative for arthralgias, joint swelling and myalgias.   Skin:  Positive for wound (Right ankle surgical wound).   Neurological:  Positive for weakness (Generalized). Negative for dizziness and light-headedness.   Psychiatric/Behavioral:  Negative for confusion and hallucinations.      Objective:     Vital Signs (Most Recent):  Temp: 97.8 °F (36.6 °C) (09/25/24 0731)  Pulse: 97 (09/25/24 1122)  Resp: 18 (09/25/24 0731)  BP: 157/69 (09/25/24 0731)  SpO2: 93 % (09/25/24 0731) on room air Vital Signs (24h Range):  Temp:  [97.8 °F (36.6 °C)-99 °F (37.2 °C)] 97.8 °F (36.6 °C)  Pulse:  [] 87  Resp:  [15-18] 17  SpO2:  [93 %-99 %] 95 %  BP: (116-144)/(49-65) 144/65     Weight: 85.4 kg (188 lb 4.4 oz)  Body mass index is 27.01 kg/m².    Intake/Output Summary (Last 24 hours) at 9/30/2024 1016  Last data filed at 9/30/2024 0138  Gross per 24 hour   Intake 155.82 ml   Output 0 ml   Net 155.82 ml         Physical Exam  Vitals and nursing note reviewed.   Constitutional:       General: She is awake. She is not in acute distress.     Appearance: Normal appearance. She is normal weight. She is not ill-appearing.      Comments: . Patient awake and alert and oriented x 4.    Eyes:      Conjunctiva/sclera: Conjunctivae normal.   Cardiovascular:      Rate and Rhythm: Normal rate and regular rhythm.      Heart sounds: Normal heart sounds. No murmur heard.     No friction rub. No gallop.   Pulmonary:      Effort: Pulmonary effort is normal. No respiratory distress.      Breath sounds: Normal breath sounds. No wheezing.      Comments: On 1 L oxygen, no dyspnea  Abdominal:       "General: Abdomen is flat. Bowel sounds are normal. There is no distension.      Palpations: Abdomen is soft.      Tenderness: There is no abdominal tenderness.   Musculoskeletal:      Right lower leg: No edema.      Left lower leg: No edema.      Comments: Right ankle wound vac   Skin:     General: Skin is warm.      Findings: No erythema.      Comments: Bluish coloration to groin areas related to application of Gentian violet   Neurological:      Mental Status: She is alert and oriented to person, place, and time.   Psychiatric:         Mood and Affect: Mood normal.         Behavior: Behavior normal. Behavior is cooperative.         Thought Content: Thought content normal.         Judgment: Judgment normal.             Significant Labs: A1C:   Recent Labs   Lab 07/10/24  1105   HGBA1C 8.2*     CBC:   Recent Labs   Lab 09/29/24 0353 09/30/24 0227   WBC 12.96* 14.77*   HGB 10.2* 8.7*   HCT 32.7* 28.5*    262     CMP:   Recent Labs   Lab 09/29/24 0353 09/30/24 0227   * 136   K 3.4* 3.5   CL 94* 97   CO2 30* 31*   * 109   BUN 25* 26*   CREATININE 1.5* 1.7*   CALCIUM 8.7 8.4*   PROT 6.2 5.7*   ALBUMIN 2.3* 2.1*   BILITOT 0.4 0.3   ALKPHOS 303* 245*   AST 42* 34   ALT 42 32   ANIONGAP 11 8     Cardiac Markers:   No results for input(s): "CKMB", "MYOGLOBIN", "BNP", "TROPISTAT" in the last 48 hours.      Magnesium:   Recent Labs   Lab 09/29/24 0353 09/30/24 0227   MG 2.2 2.3     POCT Glucose:   Recent Labs   Lab 09/29/24  1434 09/29/24  2051 09/30/24  0735   POCTGLUCOSE 188* 156* 91     Urine Studies:   No results for input(s): "COLORU", "APPEARANCEUA", "PHUR", "SPECGRAV", "PROTEINUA", "GLUCUA", "KETONESU", "BILIRUBINUA", "OCCULTUA", "NITRITE", "UROBILINOGEN", "LEUKOCYTESUR", "RBCUA", "WBCUA", "BACTERIA", "SQUAMEPITHEL", "HYALINECASTS" in the last 48 hours.    Invalid input(s): "WRIGHTSUR"      Significant Imaging: I have reviewed all pertinent imaging results/findings within the past 24 " hours.    Assessment/Plan:      * Wound dehiscence, right ankle  Closed right pilon fracture s/p ORIF on 8/14/2024  72 yo female with history of a fall resulting in a pilon fracture to the right ankle on 8/14/24 s/p ORIF. Sutures removed on 09/12/2024 in Ortho clinic. Presented to hospital for swelling and wound dehiscence to right ankle. No leukocytosis. CRP 9.3 and ESR 49. X-ray of right ankle on admit showed Orthopedic hardware intact with no acute fracture or soft tissue swelling.    - Orthopedics consulted in ED:       - Wound irrigated and soft dressings applied in ED.  - Patient non weight bearing to right lower extremity as per Ortho.   - Multimodal pain regimen. Avoid Tylenol as AST and ALT elevated. Oxycodone IR po prn for breakthrough pain.   - Plan for irrigation and debridement of right ankle wound in OR today (9/25/24) with Dr. Davalos and Orthopedics with vac placed and full op note still pending from 9/25.  -vanc/rocephin for coverage with serosanginous drainage reported and ortho note reports ID consulted but they were not so consulted 9/27 given ortho feels concern for infectious given this with hardware exposed. ID changed to dapto/rocephin on 9/28 and appreciate assistance. Rocephin stopped 9/28 per ID  Final ID recs- dapto.rocephin until 11/7 with weekly labs, PICC.   -NWB until at least 11/1 per ortho-   -prevena wound vac for a week  -was on eliquis until oct 26th post pelvic/ankel fx for ppx, okay to resume per ortho and resumed 9/28  Rec for SNF but she declines as out of days and misses her cats and had bad experience. Family aware       C. difficile colitis  + after copious diarrhea on 9/28 and oral vanc started, lomotil stpped due to toxic megacolon risk with anti-diarrhea meds  -based on history, was present on admit as the diarrehea started the day she was admitted, and was just at SNF the last month and exposure appears to be from there.  -per ID - cont full dose therapy 4 x a day  oral vanc for 10 adys then do BID while on antibiotics until complete 11/7, calculated comes out to 108 pills       Hypokalemia  Patient's most recent potassium results are listed below.   Recent Labs     09/26/24 0447 09/27/24  0400 09/28/24  0313   K 4.2 4.2 3.4*     Plan  - Replete potassium per protocol  - Monitor potassium Daily  - Patient's hypokalemia is stable  -     Diarrhea  Present on admit. Prn immodium not improving diarrhea x 3 doses 9/27, reports at least 7-8 BMs a day right now on 9/28 and causing distress and inabliity to work with therapy.   Will try lomotil 9/28 eleanor and discsused with ID, who recs to pursue c diff testing. Charge and nusring notified and messaging UD for approval based on algorithms for testing  -C diff + on 9/28 PM and stop lomotil and PO vanc started.      Acute blood loss anemia  Anemia is likely due to acute blood loss which was from fx and surgery . Most recent hemoglobin and hematocrit are listed below.  Recent Labs     09/26/24 0447 09/27/24  0400 09/28/24  0313   HGB 7.3* 7.3* 9.5*   HCT 23.5* 24.2* 29.9*       Plan  - Monitor serial CBC: Daily  - Transfuse PRBC if patient becomes hemodynamically unstable, symptomatic or H/H drops below 7/21. And will tx 1 U on 9/27 given hovering at 7.3 now and improved after tx to 9.5  - Patient has not received any PRBC transfusions to date  - Patient's anemia is currently stable      Closed right pilon fracture  - see wound dehiscence above     Transaminitis  - Patient on admit labs with , , and  and normal T. Jc.  - Patient denies abdominal pain or vomiting.  - Patient on Lipitor and Tylenol at home and either medication can effect liver so will hold both meds- had similar issue last admit and improved with holding statin  - Acute hepatitis panel on admit negative.   - Trend daily CMP.  - No signs of liver failure. Do not suspect underlying liver disease and likely medication effect.   -improving ast 60, alt 85  and now 85/69 and watch    Stage 3b chronic kidney disease  Creatine stable and at baseline BMP reviewed- noted Estimated Creatinine Clearance: 29.8 mL/min (A) (based on SCr of 2 mg/dL (H)). according to latest data. Based on current GFR, CKD stage is stage 3b. Baseline Cr 1.7-2.0. Monitor UOP and serial BMP and adjust therapy as needed. Renally dose meds. Avoid nephrotoxic medications and procedures.      Acute cystitis without hematuria  - U/A on admit with > 100 WBC and many bacteria consistent with UTI. Urine culture sent and patient started on IV Ceftriaxone 1 gram daily to treat + vanc given recent semi resistant ecoli + e faecalis last month   - Follow-up urine culture results.- with ecoli and on rocephin    Type 2 diabetes mellitus with stage 3b chronic kidney disease, with long-term current use of insulin  Patient's FSGs are controlled on current medication regimen. Patient on Lantus insulin 19 units in the evening to treat at home.   Last A1c reviewed-   Lab Results   Component Value Date    HGBA1C 8.2 (H) 07/10/2024     Most recent fingerstick glucose reviewed-   Recent Labs   Lab 09/26/24  1527 09/26/24  2126 09/27/24  0718   POCTGLUCOSE 138* 104 97       Current correctional scale  Low  Maintain anti-hyperglycemic dose as follows-   Antihyperglycemics (From admission, onward)      Start     Stop Route Frequency Ordered    09/27/24 2100  insulin glargine U-100 (Lantus) pen 16 Units         -- SubQ Nightly 09/27/24 0821    09/25/24 0008  insulin aspart U-100 pen 0-5 Units         -- SubQ Before meals & nightly PRN 09/24/24 2308          Hold Oral hypoglycemics while patient is in the hospital.  Target blood sugars 140-180 in hospital.  Monitor blood sugars with meals and at bedtime in hospital.   Diabetic diet post-op.   -watch closely as labile and had more lows than highs last admit, and took ozempic home med when was ath ome briefly which decreases her snacking. Dec levemir on 9/27 as glucose below 100      Acute on chronic diastolic heart failure  Patient is identified as having Diastolic (HFpEF) heart failure that is Acute on chronic. CHF is currently uncontrolled due to Pulmonary edema/pleural effusion on CXR. CXR on admit with mild pulmonary congestion and BNP elevated at 1373 on admit. Patient with mild excerebration. Latest ECHO performed and demonstrates- Results for orders placed during the hospital encounter of 11/03/23    Echo    Interpretation Summary    Left Ventricle: The left ventricle is normal in size. Increased wall thickness. Moderate septal thickening. Normal wall motion. There is normal systolic function with a visually estimated ejection fraction of 65 - 70%. Grade I diastolic dysfunction.    Right Ventricle: Normal right ventricular cavity size. Systolic function is normal.    Left Atrium: Left atrium is severely dilated.    Aortic Valve: There is moderate stenosis. Aortic valve area by VTI is 1.02 cm². Aortic valve peak velocity is 2.59 m/s. Mean gradient is 18 mmHg. The dimensionless index is 0.32.    Mitral Valve: There is mild regurgitation.    Tricuspid Valve: There is mild regurgitation.    Pulmonary Artery: The estimated pulmonary artery systolic pressure is 35 mmHg.    IVC/SVC: Normal venous pressure at 3 mmHg.    - started on IV Lasix 40 mg BID to treat mild heart failure. Excerebration very mild so okay to proceed with surgery as patient not hypoxic and not having any respiratory distress. Watch given mild cr high limit normal on admit but improving 9/26.  - Continue home Imdur and Toprol XL for chronic heart failure and monitor clinical status closely. Monitor on telemetry.   - Patient is off CHF pathway.    - Monitor strict Is&Os and daily weights.    - Place on fluid restriction of 1.5 L. Cardiology has not been consulted.   - Continue to stress to patient importance of self efficacy and  on diet for CHF.   - Last BNP reviewed- and noted below   Recent Labs   Lab  09/25/24  0248   BNP 1,373*     Still on oxygen 927 with signs of mild overload so keep on IV for now and off oxygen 9/29 but reports some subjective dyspnea at rest and when laying flat still so continue and reassess tomorrow for oral conversion  Euvolemic appearing 9/30 with inc bicarb now so will go to oral lasix, go to 20 BID as was not on any prevoiusly and was on 40 IV BID now so scale down and plan to cont on dc    Iron deficiency anemia  Acute blood loss anemia  Anemia is likely due to Iron deficiency and acute blood loss from right ankle. Patient with baseline iron deficiency anemia with baseline Hgb 9-10. Hgb 7.9 on admit and patient reports blood loss from ankle at home when wound dehisced causing worsening anemia. Hgb 7.9 on admit and down to 7.4 on 9/25. Patient typed and crossed and blood consent obtained and 7/3 now, will likely need transfusion in next day or so given lower 7.s watch to ensure no other bleeding signs.  Recent Labs     09/24/24  1622 09/25/24  0248 09/26/24  0447   HGB 7.9* 7.4* 7.3*   HCT 25.9* 24.1* 23.5*       Plan  - Monitor serial CBC: Daily  - Transfuse PRBC if patient becomes hemodynamically unstable, symptomatic or H/H drops below 7/21.  - Patient has not received any PRBC transfusions to date      Coronary artery disease of native artery of native heart with stable angina pectoris  Patient with known CAD s/p stent placement, which is controlled. Monitor for S/Sx of angina/ACS. Continue to monitor on telemetry. Last discharge was placed on brillinta as home cards told her never go off of it and had asa held until oct 26 when goes off eilquis to avoid triple therapy  Had asa started 9/25 post op, will plan to adjust back to brillinta given this over weekedn as hg lower 7's and will do this slowly while titrating back on to regimen was sent home last admit (eliquis until oct 26th and brillinta)  -Had stents placed in 2018 to rca and 2020 to 2 areas per dr cervantes note from [facility]  cards, had stress in 2022 that was negative for ischemia. She said he wanted to recheck another one recently but insurance did not cover. She said had aspirin allergy testing before it was restarted due to prev intolerance after a stent   -will go back to brillinta 9/28 as previous plan, and hold asa to avoid triple therapy while on eliquis as above. Will need to find out ortho plan if want to keep on eliquis longer now given longer NWB status with this ankle dehisc, vs going to ASA BID after oct 26 which was initial period of eliquis for co-ppx for ankle and pelvic fx    Mixed hyperlipidemia  Chronic and controlled. Hold home Lipitor for now due elevated LFT's.     Follicular lymphoma  S/p chemo in 2010. In remission.     Primary hypertension  Chronic, controlled. Latest blood pressure and vitals reviewed-     Temp:  [97.5 °F (36.4 °C)-98.8 °F (37.1 °C)]   Pulse:  [62-94]   Resp:  [16-20]   BP: (137-176)/(61-73)   SpO2:  [94 %-99 %] .   Home meds for hypertension were reviewed and noted below.   Hypertension Medications               isosorbide mononitrate (IMDUR) 60 MG 24 hr tablet Take 1 tablet (60 mg total) by mouth once daily.    metoprolol succinate (TOPROL-XL) 25 MG 24 hr tablet Take 1 tablet (25 mg total) by mouth once daily.            While in the hospital, will manage blood pressure as follows; Continue home antihypertensive regimen to treat in hospital.  -BP higher 9/26, had hydralazine added to isorbide/metoprolol last admit so start hydralazine now as BP 180s and she reporst pain minimal  -improving 9/27 but higher again 9/28 so will inc to 100    Will utilize p.r.n. blood pressure medication only if patient's blood pressure greater than 180/110 and she develops symptoms such as worsening chest pain or shortness of breath.      VTE Risk Mitigation (From admission, onward)           Ordered     apixaban tablet 2.5 mg  2 times daily         09/27/24 1238     IP VTE HIGH RISK PATIENT  Once          09/24/24 2231     Reason for No Pharmacological VTE Prophylaxis  Once        Question:  Reasons:  Answer:  Already adequately anticoagulated on oral Anticoagulants    09/24/24 2231                    Discharge Planning   MIKHAIL: 10/1/2024     Code Status: Full Code   Is the patient medically ready for discharge?:     Reason for patient still in hospital (select all that apply): Patient trending condition  Discharge Plan A: Home with family, Home Health                  Rupal Ochoa MD  Department of Hospital Medicine   [facility]

## 2024-10-01 PROBLEM — M62.838 NECK MUSCLE SPASM: Status: ACTIVE | Noted: 2024-10-01

## 2024-10-01 LAB
ALBUMIN SERPL BCP-MCNC: 2 G/DL (ref 3.5–5.2)
ALP SERPL-CCNC: 237 U/L (ref 55–135)
ALT SERPL W/O P-5'-P-CCNC: 35 U/L (ref 10–44)
ANION GAP SERPL CALC-SCNC: 8 MMOL/L (ref 8–16)
AST SERPL-CCNC: 41 U/L (ref 10–40)
BILIRUB SERPL-MCNC: 0.2 MG/DL (ref 0.1–1)
BUN SERPL-MCNC: 27 MG/DL (ref 8–23)
CALCIUM SERPL-MCNC: 8.4 MG/DL (ref 8.7–10.5)
CHLORIDE SERPL-SCNC: 97 MMOL/L (ref 95–110)
CO2 SERPL-SCNC: 30 MMOL/L (ref 23–29)
CREAT SERPL-MCNC: 1.7 MG/DL (ref 0.5–1.4)
ERYTHROCYTE [DISTWIDTH] IN BLOOD BY AUTOMATED COUNT: 15.3 % (ref 11.5–14.5)
EST. GFR  (NO RACE VARIABLE): 31.5 ML/MIN/1.73 M^2
GLUCOSE SERPL-MCNC: 129 MG/DL (ref 70–110)
HCT VFR BLD AUTO: 27.7 % (ref 37–48.5)
HGB BLD-MCNC: 8.7 G/DL (ref 12–16)
MAGNESIUM SERPL-MCNC: 2.3 MG/DL (ref 1.6–2.6)
MCH RBC QN AUTO: 28.7 PG (ref 27–31)
MCHC RBC AUTO-ENTMCNC: 31.4 G/DL (ref 32–36)
MCV RBC AUTO: 91 FL (ref 82–98)
PHOSPHATE SERPL-MCNC: 3.2 MG/DL (ref 2.7–4.5)
PLATELET # BLD AUTO: 237 K/UL (ref 150–450)
PMV BLD AUTO: 12.2 FL (ref 9.2–12.9)
POCT GLUCOSE: 122 MG/DL (ref 70–110)
POCT GLUCOSE: 151 MG/DL (ref 70–110)
POCT GLUCOSE: 153 MG/DL (ref 70–110)
POCT GLUCOSE: 154 MG/DL (ref 70–110)
POTASSIUM SERPL-SCNC: 3.4 MMOL/L (ref 3.5–5.1)
PROT SERPL-MCNC: 5.7 G/DL (ref 6–8.4)
RBC # BLD AUTO: 3.03 M/UL (ref 4–5.4)
SODIUM SERPL-SCNC: 135 MMOL/L (ref 136–145)
WBC # BLD AUTO: 11.4 K/UL (ref 3.9–12.7)

## 2024-10-01 PROCEDURE — 36415 COLL VENOUS BLD VENIPUNCTURE: CPT | Mod: HCNC | Performed by: HOSPITALIST

## 2024-10-01 PROCEDURE — 11000001 HC ACUTE MED/SURG PRIVATE ROOM: Mod: HCNC

## 2024-10-01 PROCEDURE — 83735 ASSAY OF MAGNESIUM: CPT | Mod: HCNC | Performed by: HOSPITALIST

## 2024-10-01 PROCEDURE — 80053 COMPREHEN METABOLIC PANEL: CPT | Mod: HCNC | Performed by: HOSPITALIST

## 2024-10-01 PROCEDURE — 25000003 PHARM REV CODE 250: Mod: HCNC | Performed by: HOSPITALIST

## 2024-10-01 PROCEDURE — 27000207 HC ISOLATION: Mod: HCNC

## 2024-10-01 PROCEDURE — 85027 COMPLETE CBC AUTOMATED: CPT | Mod: HCNC | Performed by: HOSPITALIST

## 2024-10-01 PROCEDURE — 25000003 PHARM REV CODE 250: Mod: HCNC | Performed by: NURSE PRACTITIONER

## 2024-10-01 PROCEDURE — 25000003 PHARM REV CODE 250: Mod: HCNC

## 2024-10-01 PROCEDURE — 63600175 PHARM REV CODE 636 W HCPCS: Mod: HCNC | Performed by: INTERNAL MEDICINE

## 2024-10-01 PROCEDURE — 25000003 PHARM REV CODE 250: Mod: HCNC | Performed by: INTERNAL MEDICINE

## 2024-10-01 PROCEDURE — 84100 ASSAY OF PHOSPHORUS: CPT | Mod: HCNC | Performed by: HOSPITALIST

## 2024-10-01 RX ORDER — DIAZEPAM 5 MG/1
5 TABLET ORAL ONCE
Status: COMPLETED | OUTPATIENT
Start: 2024-10-01 | End: 2024-10-01

## 2024-10-01 RX ORDER — FUROSEMIDE 40 MG/1
40 TABLET ORAL 2 TIMES DAILY
Status: DISCONTINUED | OUTPATIENT
Start: 2024-10-01 | End: 2024-10-03 | Stop reason: HOSPADM

## 2024-10-01 RX ORDER — DOXYCYCLINE HYCLATE 100 MG
100 TABLET ORAL EVERY 12 HOURS
Qty: 74 TABLET | Refills: 0 | Status: SHIPPED | OUTPATIENT
Start: 2024-10-01 | End: 2024-11-09

## 2024-10-01 RX ORDER — POTASSIUM CHLORIDE 750 MG/1
30 CAPSULE, EXTENDED RELEASE ORAL
Status: COMPLETED | OUTPATIENT
Start: 2024-10-01 | End: 2024-10-01

## 2024-10-01 RX ORDER — DOXYCYCLINE HYCLATE 100 MG
100 TABLET ORAL EVERY 12 HOURS
Status: DISCONTINUED | OUTPATIENT
Start: 2024-10-01 | End: 2024-10-03 | Stop reason: HOSPADM

## 2024-10-01 RX ADMIN — SUCRALFATE 1 G: 1 SUSPENSION ORAL at 05:10

## 2024-10-01 RX ADMIN — HYDRALAZINE HYDROCHLORIDE 100 MG: 50 TABLET ORAL at 05:10

## 2024-10-01 RX ADMIN — QUETIAPINE FUMARATE 50 MG: 25 TABLET ORAL at 09:10

## 2024-10-01 RX ADMIN — HYDROCORTISONE: 25 CREAM TOPICAL at 10:10

## 2024-10-01 RX ADMIN — METHOCARBAMOL 500 MG: 500 TABLET ORAL at 09:10

## 2024-10-01 RX ADMIN — ALUMINUM HYDROXIDE, MAGNESIUM HYDROXIDE, AND SIMETHICONE 30 ML: 1200; 120; 1200 SUSPENSION ORAL at 05:10

## 2024-10-01 RX ADMIN — VANCOMYCIN HYDROCHLORIDE 125 MG: KIT at 05:10

## 2024-10-01 RX ADMIN — METHOCARBAMOL 500 MG: 500 TABLET ORAL at 12:10

## 2024-10-01 RX ADMIN — DOXYCYCLINE HYCLATE 100 MG: 100 TABLET, COATED ORAL at 09:10

## 2024-10-01 RX ADMIN — HYDRALAZINE HYDROCHLORIDE 100 MG: 50 TABLET ORAL at 01:10

## 2024-10-01 RX ADMIN — APIXABAN 2.5 MG: 2.5 TABLET, FILM COATED ORAL at 08:10

## 2024-10-01 RX ADMIN — SUCRALFATE 1 G: 1 SUSPENSION ORAL at 12:10

## 2024-10-01 RX ADMIN — METHOCARBAMOL 500 MG: 500 TABLET ORAL at 08:10

## 2024-10-01 RX ADMIN — SUCRALFATE 1 G: 1 SUSPENSION ORAL at 11:10

## 2024-10-01 RX ADMIN — FUROSEMIDE 20 MG: 20 TABLET ORAL at 08:10

## 2024-10-01 RX ADMIN — POTASSIUM CHLORIDE 30 MEQ: 10 CAPSULE, COATED, EXTENDED RELEASE ORAL at 08:10

## 2024-10-01 RX ADMIN — ISOSORBIDE MONONITRATE 60 MG: 30 TABLET, EXTENDED RELEASE ORAL at 08:10

## 2024-10-01 RX ADMIN — VANCOMYCIN HYDROCHLORIDE 125 MG: KIT at 12:10

## 2024-10-01 RX ADMIN — TICAGRELOR 90 MG: 90 TABLET ORAL at 09:10

## 2024-10-01 RX ADMIN — METOPROLOL SUCCINATE 25 MG: 25 TABLET, EXTENDED RELEASE ORAL at 08:10

## 2024-10-01 RX ADMIN — ALUMINUM HYDROXIDE, MAGNESIUM HYDROXIDE, AND SIMETHICONE 30 ML: 1200; 120; 1200 SUSPENSION ORAL at 10:10

## 2024-10-01 RX ADMIN — HYDRALAZINE HYDROCHLORIDE 100 MG: 50 TABLET ORAL at 09:10

## 2024-10-01 RX ADMIN — DAPTOMYCIN 685 MG: 350 INJECTION, POWDER, LYOPHILIZED, FOR SOLUTION INTRAVENOUS at 10:10

## 2024-10-01 RX ADMIN — TICAGRELOR 90 MG: 90 TABLET ORAL at 08:10

## 2024-10-01 RX ADMIN — FUROSEMIDE 40 MG: 40 TABLET ORAL at 05:10

## 2024-10-01 RX ADMIN — METHOCARBAMOL 500 MG: 500 TABLET ORAL at 04:10

## 2024-10-01 RX ADMIN — DIAZEPAM 5 MG: 5 TABLET ORAL at 09:10

## 2024-10-01 RX ADMIN — VANCOMYCIN HYDROCHLORIDE 125 MG: KIT at 11:10

## 2024-10-01 RX ADMIN — ALUMINUM HYDROXIDE, MAGNESIUM HYDROXIDE, AND SIMETHICONE 30 ML: 1200; 120; 1200 SUSPENSION ORAL at 04:10

## 2024-10-01 RX ADMIN — POTASSIUM CHLORIDE 30 MEQ: 10 CAPSULE, COATED, EXTENDED RELEASE ORAL at 11:10

## 2024-10-01 RX ADMIN — ALUMINUM HYDROXIDE, MAGNESIUM HYDROXIDE, AND SIMETHICONE 30 ML: 1200; 120; 1200 SUSPENSION ORAL at 09:10

## 2024-10-01 RX ADMIN — APIXABAN 2.5 MG: 2.5 TABLET, FILM COATED ORAL at 09:10

## 2024-10-01 RX ADMIN — INSULIN GLARGINE 16 UNITS: 100 INJECTION, SOLUTION SUBCUTANEOUS at 09:10

## 2024-10-01 RX ADMIN — FLUOXETINE HYDROCHLORIDE 40 MG: 20 CAPSULE ORAL at 08:10

## 2024-10-01 NOTE — PLAN OF CARE
Problem: Adult Inpatient Plan of Care  Goal: Plan of Care Review  Outcome: Progressing  Goal: Patient-Specific Goal (Individualized)  Outcome: Progressing  Goal: Absence of Hospital-Acquired Illness or Injury  Outcome: Progressing  Goal: Optimal Comfort and Wellbeing  Outcome: Progressing  Goal: Readiness for Transition of Care  Outcome: Progressing     Problem: Adult Inpatient Plan of Care  Goal: Plan of Care Review  Outcome: Progressing     Problem: Adult Inpatient Plan of Care  Goal: Optimal Comfort and Wellbeing  Outcome: Progressing     Problem: Adult Inpatient Plan of Care  Goal: Readiness for Transition of Care  Outcome: Progressing   Remain SR on telemetry monitor. PRN medication effective for anxiety and  pain Explained plan of care, verbalized understanding. Educated pt .on new medicine . No injury during shift, Side rails up x 2, call light by bedside.   Incontinent of urine and bowel  pt states she was more anxious than yesterday about discharge and  care for C-diff at home

## 2024-10-01 NOTE — PLAN OF CARE
10/01/24 1349   Post-Acute Status   Post-Acute Authorization Home Health   Home Health Status Referrals Sent   Discharge Plan   Discharge Plan A Home Health       Pt assigned to [facility], Pt will d/c with PO-ABX, referral to [facility] cancelled.    Faith Nunes, MARS  Case Management   [facility]   Ext. [phone]

## 2024-10-01 NOTE — PLAN OF CARE
Problem: Adult Inpatient Plan of Care  Goal: Plan of Care Review  Outcome: Progressing  Goal: Patient-Specific Goal (Individualized)  Outcome: Progressing  Goal: Absence of Hospital-Acquired Illness or Injury  Outcome: Progressing  Goal: Optimal Comfort and Wellbeing  Outcome: Progressing  Goal: Readiness for Transition of Care  Outcome: Progressing     Problem: Infection  Goal: Absence of Infection Signs and Symptoms  Outcome: Progressing     Problem: Diabetes Comorbidity  Goal: Blood Glucose Level Within Targeted Range  Outcome: Progressing     Problem: Acute Kidney Injury/Impairment  Goal: Fluid and Electrolyte Balance  Outcome: Progressing  Goal: Improved Oral Intake  Outcome: Progressing  Goal: Effective Renal Function  Outcome: Progressing     Problem: Wound  Goal: Optimal Coping  Outcome: Progressing  Goal: Optimal Functional Ability  Outcome: Progressing  Goal: Absence of Infection Signs and Symptoms  Outcome: Progressing  Goal: Improved Oral Intake  Outcome: Progressing  Goal: Optimal Pain Control and Function  Outcome: Progressing  Goal: Skin Health and Integrity  Outcome: Progressing  Goal: Optimal Wound Healing  Outcome: Progressing     Problem: Skin Injury Risk Increased  Goal: Skin Health and Integrity  Outcome: Progressing     Problem: Fall Injury Risk  Goal: Absence of Fall and Fall-Related Injury  Outcome: Progressing     Addressed patients pain, pump, position, need for potty, and possessions in reach. Patient remains free of falls, and verbalizes understanding in fall prevention and safety. Safety measures remain in place. Reinforced fall prevention and safety education. Call light and personal belongings within reach, bed in lowest position with wheels locked, side rails up x2, Instructed to call with any needs or complaints, verbalized understanding. Continue current plan of care.

## 2024-10-01 NOTE — PROGRESS NOTES
"[facility] Medicine  Progress Note    Patient Name: Lorena Contreras  MRN: [id number]  Patient Class: IP- Inpatient   Admission Date: 9/24/2024  Length of Stay: 6 days  Attending Physician: Rupal Ochoa MD  Primary Care Provider: Jorge Paris MD        Subjective:     Principal Problem:Wound dehiscence        HPI:  Lorena Contreras is a 74 yo F with PMHx of  DM2, Fibromyalgia, lymphoma s/p chemo, HTN, HLD, CVA, MI, CAD s/p PCIs, CKD3b, HFpEF, and anemia who presented to ED for R ankle wound dehiscence. The patient reports that on 8/14/24, she was walking into a [employer] when she lost her balance, fell, and twisted her ankle. She sustained a pelvic fracture and a right pilon fracture, requiring surgery at [facility] later that day. Following the fall, she experienced urinary retention and had an indwelling catheter placed. While at the SNF, the patient reports significant progress, noting that her injuries were healing well, and she regained some mobility. Her wound was evaluated, and sutures were removed on 9/12/24. The patient was discharged home four days ago but describes her experience as "miserable," citing a lack of mobility aids provided for home use. At SNF, she had not been bearing weight on her RLE. She reports while attempting to stand on her RLE with home health her injury opened up. Since then, she has had increased swelling to her R ankle and associated generalized weakness and fatigue, decreased appetite, constipation, SOB (when anxious) and nausea. She denies fevers, chills, rhinorrhea, sore throat, cough, hemoptysis, chest pain, vomiting, diarrhea, or blood in her stool.     ED Course: AFVSS. CBC significant hgb 7.9 (down from 11.8 on month ago). No leukocytosis. CMP with Cr 2.0 (at baseline), transaminitis , , and . BG initially 232, but now 91. CRP 93 and ESR 49. Lactate 0.91. R ankle XR with stable hardware and no acute fx or dislocation, but " concerning for cellulitis. Ortho consulted and planning for I&D in AM. Given IV zofran. Placed in observation with HM.       Overview/Hospital Course:  Orthopedics consulted and patient being taken to OR today for incision and drainage and washout of right ankle surgical site for wound dehiscence. Patient with no obvious clinical signs of infection.     Interval History: seen this morning and she was havign some distress some neck spasms that were visible on exam with signs of spasming causing some head ticking from spasm. Will try valium x 1 and has robaxin as well and nursing to let me know if worsens or needs another dose if reoccurs if successul in helping with this. ID and ortho chat yesterday with ortho preference for PO antibiotics and discussed more of what they saw intra-op with teams and ID will adjust and give new regimen for orals at this time and awaiting recs for this and will give dose in [place] one recs are in and updated her daughter on this yesterday and patient as well and CM. Will update HH orders once have new recommendations. Her adughter said that she was given the meds from the  SNF that she needed of the new ones like eliquis when we reviewed that yeterday on phone as wanted to make sure she had those meds when left SNF that were new. Okay to keep same eliquis end date per dr hansen as previous 1026. She was on 40 lasix after she left SNF so will go to 40 lasix BID on dc now as she wasn't on that here and that was started at SNF but wasn't enough as waas in overload on admit here. She had 5-6 Bms in last 24 hours at most, she is having trouble counting how many there are as she says she will have a BM then will stop but feels like she still has to go some more so she thinks some of it is still from the previous BM that she held in and isnt necessarily a true new BM so number may be less than that.      Review of Systems   Constitutional:  Negative for chills and fever.   Respiratory:   Negative for cough and shortness of breath.    Cardiovascular:  Negative for chest pain, palpitations and leg swelling.   Gastrointestinal:  Negative for abdominal pain, nausea and vomiting.   Genitourinary:  Negative for difficulty urinating.   Musculoskeletal:  Negative for arthralgias, joint swelling and myalgias.   Skin:  Positive for wound (Right ankle surgical wound).   Neurological:  Positive for weakness (Generalized). Negative for dizziness and light-headedness.   Psychiatric/Behavioral:  Negative for confusion and hallucinations.      Objective:     Vital Signs (Most Recent):  Temp: 97.8 °F (36.6 °C) (09/25/24 0731)  Pulse: 97 (09/25/24 1122)  Resp: 18 (09/25/24 0731)  BP: 157/69 (09/25/24 0731)  SpO2: 93 % (09/25/24 0731) on room air Vital Signs (24h Range):  Temp:  [97.9 °F (36.6 °C)-99.1 °F (37.3 °C)] 97.9 °F (36.6 °C)  Pulse:  [] 87  Resp:  [15-19] 17  SpO2:  [95 %-99 %] 95 %  BP: (143-178)/(65-74) 178/74     Weight: 85.4 kg (188 lb 4.4 oz)  Body mass index is 27.01 kg/m².  No intake or output data in the 24 hours ending 10/01/24 1157        Physical Exam  Vitals and nursing note reviewed.   Constitutional:       General: She is awake. She is not in acute distress.     Appearance: Normal appearance. She is normal weight. She is not ill-appearing.      Comments: . Patient awake and alert and oriented x 4.    Eyes:      Conjunctiva/sclera: Conjunctivae normal.   Neck:      Comments: Having active muscle spasms in neck on exam  Cardiovascular:      Rate and Rhythm: Normal rate and regular rhythm.      Heart sounds: Normal heart sounds. No murmur heard.     No friction rub. No gallop.   Pulmonary:      Effort: Pulmonary effort is normal. No respiratory distress.      Breath sounds: Normal breath sounds. No wheezing.      Comments: On 1 L oxygen, no dyspnea  Abdominal:      General: Abdomen is flat. Bowel sounds are normal. There is no distension.      Palpations: Abdomen is soft.      Tenderness:  "There is no abdominal tenderness.   Musculoskeletal:      Right lower leg: No edema.      Left lower leg: No edema.      Comments: Right ankle wound vac   Skin:     General: Skin is warm.      Findings: No erythema.      Comments: Bluish coloration to groin areas related to application of Gentian violet   Neurological:      Mental Status: She is alert and oriented to person, place, and time.   Psychiatric:         Mood and Affect: Mood normal.         Behavior: Behavior normal. Behavior is cooperative.         Thought Content: Thought content normal.         Judgment: Judgment normal.             Significant Labs: A1C:   Recent Labs   Lab 07/10/24  1105   HGBA1C 8.2*     CBC:   Recent Labs   Lab 09/30/24  0227 10/01/24  0408   WBC 14.77* 11.40   HGB 8.7* 8.7*   HCT 28.5* 27.7*    237     CMP:   Recent Labs   Lab 09/30/24  0227 10/01/24  0408    135*   K 3.5 3.4*   CL 97 97   CO2 31* 30*    129*   BUN 26* 27*   CREATININE 1.7* 1.7*   CALCIUM 8.4* 8.4*   PROT 5.7* 5.7*   ALBUMIN 2.1* 2.0*   BILITOT 0.3 0.2   ALKPHOS 245* 237*   AST 34 41*   ALT 32 35   ANIONGAP 8 8     Cardiac Markers:   No results for input(s): "CKMB", "MYOGLOBIN", "BNP", "TROPISTAT" in the last 48 hours.      Magnesium:   Recent Labs   Lab 09/30/24  0227 10/01/24  0408   MG 2.3 2.3     POCT Glucose:   Recent Labs   Lab 09/30/24  1542 09/30/24  2107 10/01/24  0741   POCTGLUCOSE 165* 211* 122*     Urine Studies:   No results for input(s): "COLORU", "APPEARANCEUA", "PHUR", "SPECGRAV", "PROTEINUA", "GLUCUA", "KETONESU", "BILIRUBINUA", "OCCULTUA", "NITRITE", "UROBILINOGEN", "LEUKOCYTESUR", "RBCUA", "WBCUA", "BACTERIA", "SQUAMEPITHEL", "HYALINECASTS" in the last 48 hours.    Invalid input(s): "WRIGHTSUR"      Significant Imaging: I have reviewed all pertinent imaging results/findings within the past 24 hours.    Assessment/Plan:      * Wound dehiscence, right ankle  Closed right pilon fracture s/p ORIF on 8/14/2024  72 yo female with " history of a fall resulting in a pilon fracture to the right ankle on 8/14/24 s/p ORIF. Sutures removed on 09/12/2024 in Ortho clinic. Presented to hospital for swelling and wound dehiscence to right ankle. No leukocytosis. CRP 9.3 and ESR 49. X-ray of right ankle on admit showed Orthopedic hardware intact with no acute fracture or soft tissue swelling.    - Orthopedics consulted in ED:       - Wound irrigated and soft dressings applied in ED.  - Patient non weight bearing to right lower extremity as per Ortho.   - Multimodal pain regimen. Avoid Tylenol as AST and ALT elevated. Oxycodone IR po prn for breakthrough pain.   - Plan for irrigation and debridement of right ankle wound in OR today (9/25/24) with Dr. Hansen and Orthopedics with vac placed and full op note still pending from 9/25.  -vanc/rocephin for coverage with serosanginous drainage reported and ortho note reports ID consulted but they were not so consulted 9/27 given ortho feels concern for infectious given this with hardware exposed. ID changed to dapto/rocephin on 9/28 and appreciate assistance. Rocephin stopped 9/28 per ID  Final ID recs- dapto.[email or web address] until 11/7 with weekly labs, PICC.   Discussion with jade and ID Dr Dhaliwal and ortho discussed more of findings intra-op with team and given no cultures growth preferred oral if possible and so ID feels okay and will adjust recs and awaiting new recs now 10/1.  -NWB until at least 11/1 per ortho-   -prevena wound vac for a week  -was on eliquis until oct 26th post pelvic/ankel fx for ppx, okay to resume per ortho and resumed 9/28. Continue with prev end date per dr hansen  Rec for SNF but she declines as out of days and misses her cats and had bad experience. Family aware       Neck muscle spasm  Active on exam 10/1 and new, on robaxin PRN and will trial valium x 1 to see if can break spasm      C. difficile colitis  + after copious diarrhea on 9/28 and oral vanc started, lomotil stpped due to  toxic megacolon risk with anti-diarrhea meds  -based on history, was present on admit as the diarrehea started the day she was admitted, and was just at SNF the last month and exposure appears to be from there.  -per ID - cont full dose therapy 4 x a day oral vanc for 10 adys then do BID while on antibiotics until complete 11/7, calculated comes out to 108 pills       Hypokalemia  Patient's most recent potassium results are listed below.   Recent Labs     09/26/24 0447 09/27/24  0400 09/28/24  0313   K 4.2 4.2 3.4*     Plan  - Replete potassium per protocol  - Monitor potassium Daily  - Patient's hypokalemia is stable  -     Diarrhea  Present on admit. Prn immodium not improving diarrhea x 3 doses 9/27, reports at least 7-8 BMs a day right now on 9/28 and causing distress and inabliity to work with therapy.   Will try lomotil 9/28 eleanor and discsused with ID, who recs to pursue c diff testing. Charge and nusring notified and messaging UD for approval based on algorithms for testing  -C diff + on 9/28 PM and stop lomotil and PO vanc started.      Acute blood loss anemia  Anemia is likely due to acute blood loss which was from fx and surgery . Most recent hemoglobin and hematocrit are listed below.  Recent Labs     09/26/24 0447 09/27/24  0400 09/28/24  0313   HGB 7.3* 7.3* 9.5*   HCT 23.5* 24.2* 29.9*       Plan  - Monitor serial CBC: Daily  - Transfuse PRBC if patient becomes hemodynamically unstable, symptomatic or H/H drops below 7/21. And will tx 1 U on 9/27 given hovering at 7.3 now and improved after tx to 9.5  - Patient has not received any PRBC transfusions to date  - Patient's anemia is currently stable      Closed right pilon fracture  - see wound dehiscence above     Transaminitis  - Patient on admit labs with , , and  and normal T. Jc.  - Patient denies abdominal pain or vomiting.  - Patient on Lipitor and Tylenol at home and either medication can effect liver so will hold both meds-  had similar issue last admit and improved with holding statin  - Acute hepatitis panel on admit negative.   - Trend daily CMP.  - No signs of liver failure. Do not suspect underlying liver disease and likely medication effect.   -improving ast 60, alt 85 and now 85/69 and watch    Stage 3b chronic kidney disease  Creatine stable and at baseline BMP reviewed- noted Estimated Creatinine Clearance: 29.8 mL/min (A) (based on SCr of 2 mg/dL (H)). according to latest data. Based on current GFR, CKD stage is stage 3b. Baseline Cr 1.7-2.0. Monitor UOP and serial BMP and adjust therapy as needed. Renally dose meds. Avoid nephrotoxic medications and procedures.      Acute cystitis without hematuria  - U/A on admit with > 100 WBC and many bacteria consistent with UTI. Urine culture sent and patient started on IV Ceftriaxone 1 gram daily to treat + vanc given recent semi resistant ecoli + e faecalis last month   - Follow-up urine culture results.- with ecoli and on rocephin    Type 2 diabetes mellitus with stage 3b chronic kidney disease, with long-term current use of insulin  Patient's FSGs are controlled on current medication regimen. Patient on Lantus insulin 19 units in the evening to treat at home.   Last A1c reviewed-   Lab Results   Component Value Date    HGBA1C 8.2 (H) 07/10/2024     Most recent fingerstick glucose reviewed-   Recent Labs   Lab 09/26/24  1527 09/26/24  2126 09/27/24  0718   POCTGLUCOSE 138* 104 97       Current correctional scale  Low  Maintain anti-hyperglycemic dose as follows-   Antihyperglycemics (From admission, onward)      Start     Stop Route Frequency Ordered    09/27/24 2100  insulin glargine U-100 (Lantus) pen 16 Units         -- SubQ Nightly 09/27/24 0821    09/25/24 0008  insulin aspart U-100 pen 0-5 Units         -- SubQ Before meals & nightly PRN 09/24/24 2308          Hold Oral hypoglycemics while patient is in the hospital.  Target blood sugars 140-180 in hospital.  Monitor blood  sugars with meals and at bedtime in hospital.   Diabetic diet post-op.   -watch closely as labile and had more lows than highs last admit, and took ozempic home med when was ath ome briefly which decreases her snacking. Dec levemir on 9/27 as glucose below 100     Acute on chronic diastolic heart failure  Patient is identified as having Diastolic (HFpEF) heart failure that is Acute on chronic. CHF is currently uncontrolled due to Pulmonary edema/pleural effusion on CXR. CXR on admit with mild pulmonary congestion and BNP elevated at 1373 on admit. Patient with mild excerebration. Latest ECHO performed and demonstrates- Results for orders placed during the hospital encounter of 11/03/23    Echo    Interpretation Summary    Left Ventricle: The left ventricle is normal in size. Increased wall thickness. Moderate septal thickening. Normal wall motion. There is normal systolic function with a visually estimated ejection fraction of 65 - 70%. Grade I diastolic dysfunction.    Right Ventricle: Normal right ventricular cavity size. Systolic function is normal.    Left Atrium: Left atrium is severely dilated.    Aortic Valve: There is moderate stenosis. Aortic valve area by VTI is 1.02 cm². Aortic valve peak velocity is 2.59 m/s. Mean gradient is 18 mmHg. The dimensionless index is 0.32.    Mitral Valve: There is mild regurgitation.    Tricuspid Valve: There is mild regurgitation.    Pulmonary Artery: The estimated pulmonary artery systolic pressure is 35 mmHg.    IVC/SVC: Normal venous pressure at 3 mmHg.    - started on IV Lasix 40 mg BID to treat mild heart failure. Excerebration very mild so okay to proceed with surgery as patient not hypoxic and not having any respiratory distress. Watch given mild cr high limit normal on admit but improving 9/26.  - Continue home Imdur and Toprol XL for chronic heart failure and monitor clinical status closely. Monitor on telemetry.   - Patient is off CHF pathway.    - Monitor strict  Is&Os and daily weights.    - Place on fluid restriction of 1.5 L. Cardiology has not been consulted.   - Continue to stress to patient importance of self efficacy and  on diet for CHF.   - Last BNP reviewed- and noted below   Recent Labs   Lab 09/25/24  0248   BNP 1,373*     Still on oxygen 927 with signs of mild overload so keep on IV for now and off oxygen 9/29 but reports some subjective dyspnea at rest and when laying flat still so continue and reassess tomorrow for oral conversion  Euvolemic appearing 9/30 with inc bicarb now so will go to oral lasix, go to 20 BID as was not on any prevoiusly and was on 40 IV BID now so scale down and plan to cont on dc  Her daughter reports had lasix 40 started at SNF and on discharge when came in overloaded so plan to change now to 40 BID knowing that and keep on this on dischareg    Iron deficiency anemia  Acute blood loss anemia  Anemia is likely due to Iron deficiency and acute blood loss from right ankle. Patient with baseline iron deficiency anemia with baseline Hgb 9-10. Hgb 7.9 on admit and patient reports blood loss from ankle at home when wound dehisced causing worsening anemia. Hgb 7.9 on admit and down to 7.4 on 9/25. Patient typed and crossed and blood consent obtained and 7/3 now, will likely need transfusion in next day or so given lower 7.s watch to ensure no other bleeding signs.  Recent Labs     09/24/24  1622 09/25/24  0248 09/26/24  0447   HGB 7.9* 7.4* 7.3*   HCT 25.9* 24.1* 23.5*       Plan  - Monitor serial CBC: Daily  - Transfuse PRBC if patient becomes hemodynamically unstable, symptomatic or H/H drops below 7/21.  - Patient has not received any PRBC transfusions to date      Coronary artery disease of native artery of native heart with stable angina pectoris  Patient with known CAD s/p stent placement, which is controlled. Monitor for S/Sx of angina/ACS. Continue to monitor on telemetry. Last discharge was placed on brillinta as home cards  told her never go off of it and had asa held until oct 26 when goes off eilquis to avoid triple therapy  Had asa started 9/25 post op, will plan to adjust back to brillinta given this over weekedn as hg lower 7's and will do this slowly while titrating back on to regimen was sent home last admit (eliquis until oct 26th and brillinta)  -Had stents placed in 2018 to rca and 2020 to 2 areas per dr cervantes note from [facility], had stress in 2022 that was negative for ischemia. She said he wanted to recheck another one recently but insurance did not cover. She said had aspirin allergy testing before it was restarted due to prev intolerance after a stent   -will go back to brillinta 9/28 as previous plan, and hold asa to avoid triple therapy while on eliquis as above. Will need to find out ortho plan if want to keep on eliquis longer now given longer NWB status with this ankle dehisc, vs going to ASA BID after oct 26 which was initial period of eliquis for co-ppx for ankle and pelvic fx    Mixed hyperlipidemia  Chronic and controlled. Hold home Lipitor for now due elevated LFT's.     Follicular lymphoma  S/p chemo in 2010. In remission.     Primary hypertension  Chronic, controlled. Latest blood pressure and vitals reviewed-     Temp:  [97.5 °F (36.4 °C)-98.8 °F (37.1 °C)]   Pulse:  [62-94]   Resp:  [16-20]   BP: (137-176)/(61-73)   SpO2:  [94 %-99 %] .   Home meds for hypertension were reviewed and noted below.   Hypertension Medications               isosorbide mononitrate (IMDUR) 60 MG 24 hr tablet Take 1 tablet (60 mg total) by mouth once daily.    metoprolol succinate (TOPROL-XL) 25 MG 24 hr tablet Take 1 tablet (25 mg total) by mouth once daily.            While in the hospital, will manage blood pressure as follows; Continue home antihypertensive regimen to treat in hospital.  -BP higher 9/26, had hydralazine added to isorbide/metoprolol last admit so start hydralazine now as BP 180s and she reporst pain  minimal  -improving 9/27 but higher again 9/28 so will inc to 100    Will utilize p.r.n. blood pressure medication only if patient's blood pressure greater than 180/110 and she develops symptoms such as worsening chest pain or shortness of breath.      VTE Risk Mitigation (From admission, onward)           Ordered     apixaban tablet 2.5 mg  2 times daily         09/27/24 1238     IP VTE HIGH RISK PATIENT  Once         09/24/24 2231     Reason for No Pharmacological VTE Prophylaxis  Once        Question:  Reasons:  Answer:  Already adequately anticoagulated on oral Anticoagulants    09/24/24 2231                    Discharge Planning   MIKHAIL: 10/2/2024     Code Status: Full Code   Is the patient medically ready for discharge?:     Reason for patient still in hospital (select all that apply): Patient trending condition  Discharge Plan A: Home with family, Home Health                  Rupal Ochoa MD  Department of Hospital Medicine   [facility] - Surgery

## 2024-10-01 NOTE — ASSESSMENT & PLAN NOTE
Patient is identified as having Diastolic (HFpEF) heart failure that is Acute on chronic. CHF is currently uncontrolled due to Pulmonary edema/pleural effusion on CXR. CXR on admit with mild pulmonary congestion and BNP elevated at 1373 on admit. Patient with mild excerebration. Latest ECHO performed and demonstrates- Results for orders placed during the hospital encounter of 11/03/23    Echo    Interpretation Summary    Left Ventricle: The left ventricle is normal in size. Increased wall thickness. Moderate septal thickening. Normal wall motion. There is normal systolic function with a visually estimated ejection fraction of 65 - 70%. Grade I diastolic dysfunction.    Right Ventricle: Normal right ventricular cavity size. Systolic function is normal.    Left Atrium: Left atrium is severely dilated.    Aortic Valve: There is moderate stenosis. Aortic valve area by VTI is 1.02 cm². Aortic valve peak velocity is 2.59 m/s. Mean gradient is 18 mmHg. The dimensionless index is 0.32.    Mitral Valve: There is mild regurgitation.    Tricuspid Valve: There is mild regurgitation.    Pulmonary Artery: The estimated pulmonary artery systolic pressure is 35 mmHg.    IVC/SVC: Normal venous pressure at 3 mmHg.    - started on IV Lasix 40 mg BID to treat mild heart failure. Excerebration very mild so okay to proceed with surgery as patient not hypoxic and not having any respiratory distress. Watch given mild cr high limit normal on admit but improving 9/26.  - Continue home Imdur and Toprol XL for chronic heart failure and monitor clinical status closely. Monitor on telemetry.   - Patient is off CHF pathway.    - Monitor strict Is&Os and daily weights.    - Place on fluid restriction of 1.5 L. Cardiology has not been consulted.   - Continue to stress to patient importance of self efficacy and  on diet for CHF.   - Last BNP reviewed- and noted below   Recent Labs   Lab 09/25/24  0248   BNP 1,373*     Still on oxygen 927 with  signs of mild overload so keep on IV for now and off oxygen 9/29 but reports some subjective dyspnea at rest and when laying flat still so continue and reassess tomorrow for oral conversion  Euvolemic appearing 9/30 with inc bicarb now so will go to oral lasix, go to 20 BID as was not on any prevoiusly and was on 40 IV BID now so scale down and plan to cont on dc  Her daughter reports had lasix 40 started at SNF and on discharge when came in overloaded so plan to change now to 40 BID knowing that and keep on this on dischareg

## 2024-10-01 NOTE — SUBJECTIVE & OBJECTIVE
Interval History: seen this morning and she was havign some distress some neck spasms that were visible on exam with signs of spasming causing some head ticking from spasm. Will try valium x 1 and has robaxin as well and nursing to let me know if worsens or needs another dose if reoccurs if successul in helping with this. ID and ortho chat yesterday with ortho preference for PO antibiotics and discussed more of what they saw intra-op with teams and ID will adjust and give new regimen for orals at this time and awaiting recs for this and will give dose in house one recs are in and updated her daughter on this yesterday and patient as well and CM. Will update HH orders once have new recommendations. Her adughter said that she was given the meds from the  SNF that she needed of the new ones like eliquis when we reviewed that yeterday on phone as wanted to make sure she had those meds when left SNF that were new. Okay to keep same eliquis end date per dr hansen as previous 1026. She was on 40 lasix after she left SNF so will go to 40 lasix BID on dc now as she wasn't on that here and that was started at [facility] but wasn't enough as waas in overload on admit here. She had 5-6 Bms in last 24 hours at most, she is having trouble counting how many there are as she says she will have a BM then will stop but feels like she still has to go some more so she thinks some of it is still from the previous BM that she held in and isnt necessarily a true new BM so number may be less than that.      Review of Systems   Constitutional:  Negative for chills and fever.   Respiratory:  Negative for cough and shortness of breath.    Cardiovascular:  Negative for chest pain, palpitations and leg swelling.   Gastrointestinal:  Negative for abdominal pain, nausea and vomiting.   Genitourinary:  Negative for difficulty urinating.   Musculoskeletal:  Negative for arthralgias, joint swelling and myalgias.   Skin:  Positive for wound (Right ankle  surgical wound).   Neurological:  Positive for weakness (Generalized). Negative for dizziness and light-headedness.   Psychiatric/Behavioral:  Negative for confusion and hallucinations.      Objective:     Vital Signs (Most Recent):  Temp: 97.8 °F (36.6 °C) (09/25/24 0731)  Pulse: 97 (09/25/24 1122)  Resp: 18 (09/25/24 0731)  BP: 157/69 (09/25/24 0731)  SpO2: 93 % (09/25/24 0731) on room air Vital Signs (24h Range):  Temp:  [97.9 °F (36.6 °C)-99.1 °F (37.3 °C)] 97.9 °F (36.6 °C)  Pulse:  [] 87  Resp:  [15-19] 17  SpO2:  [95 %-99 %] 95 %  BP: (143-178)/(65-74) 178/74     Weight: 85.4 kg (188 lb 4.4 oz)  Body mass index is 27.01 kg/m².  No intake or output data in the 24 hours ending 10/01/24 1157        Physical Exam  Vitals and nursing note reviewed.   Constitutional:       General: She is awake. She is not in acute distress.     Appearance: Normal appearance. She is normal weight. She is not ill-appearing.      Comments: . Patient awake and alert and oriented x 4.    Eyes:      Conjunctiva/sclera: Conjunctivae normal.   Neck:      Comments: Having active muscle spasms in neck on exam  Cardiovascular:      Rate and Rhythm: Normal rate and regular rhythm.      Heart sounds: Normal heart sounds. No murmur heard.     No friction rub. No gallop.   Pulmonary:      Effort: Pulmonary effort is normal. No respiratory distress.      Breath sounds: Normal breath sounds. No wheezing.      Comments: On 1 L oxygen, no dyspnea  Abdominal:      General: Abdomen is flat. Bowel sounds are normal. There is no distension.      Palpations: Abdomen is soft.      Tenderness: There is no abdominal tenderness.   Musculoskeletal:      Right lower leg: No edema.      Left lower leg: No edema.      Comments: Right ankle wound vac   Skin:     General: Skin is warm.      Findings: No erythema.      Comments: Bluish coloration to groin areas related to application of Gentian violet   Neurological:      Mental Status: She is alert and  "oriented to person, place, and time.   Psychiatric:         Mood and Affect: Mood normal.         Behavior: Behavior normal. Behavior is cooperative.         Thought Content: Thought content normal.         Judgment: Judgment normal.             Significant Labs: A1C:   Recent Labs   Lab 07/10/24  1105   HGBA1C 8.2*     CBC:   Recent Labs   Lab 09/30/24  0227 10/01/24  0408   WBC 14.77* 11.40   HGB 8.7* 8.7*   HCT 28.5* 27.7*    237     CMP:   Recent Labs   Lab 09/30/24  0227 10/01/24  0408    135*   K 3.5 3.4*   CL 97 97   CO2 31* 30*    129*   BUN 26* 27*   CREATININE 1.7* 1.7*   CALCIUM 8.4* 8.4*   PROT 5.7* 5.7*   ALBUMIN 2.1* 2.0*   BILITOT 0.3 0.2   ALKPHOS 245* 237*   AST 34 41*   ALT 32 35   ANIONGAP 8 8     Cardiac Markers:   No results for input(s): "CKMB", "MYOGLOBIN", "BNP", "TROPISTAT" in the last 48 hours.      Magnesium:   Recent Labs   Lab 09/30/24  0227 10/01/24  0408   MG 2.3 2.3     POCT Glucose:   Recent Labs   Lab 09/30/24  1542 09/30/24  2107 10/01/24  0741   POCTGLUCOSE 165* 211* 122*     Urine Studies:   No results for input(s): "COLORU", "APPEARANCEUA", "PHUR", "SPECGRAV", "PROTEINUA", "GLUCUA", "KETONESU", "BILIRUBINUA", "OCCULTUA", "NITRITE", "UROBILINOGEN", "LEUKOCYTESUR", "RBCUA", "WBCUA", "BACTERIA", "SQUAMEPITHEL", "HYALINECASTS" in the last 48 hours.    Invalid input(s): "WRIGHTSUR"      Significant Imaging: I have reviewed all pertinent imaging results/findings within the past 24 hours.  "

## 2024-10-01 NOTE — PT/OT/SLP PROGRESS
Physical Therapy      Patient Name:  Lorena Contreras   MRN:  [id number]    Patient not seen today secondary to Other (Comment) (pt reports not feeling well and having increased occurrence of diahrrhea, 2nd attempt). Will follow-up 10/2/2024.

## 2024-10-01 NOTE — ASSESSMENT & PLAN NOTE
Closed right pilon fracture s/p ORIF on 8/14/2024  72 yo female with history of a fall resulting in a pilon fracture to the right ankle on 8/14/24 s/p ORIF. Sutures removed on 09/12/2024 in Ortho clinic. Presented to hospital for swelling and wound dehiscence to right ankle. No leukocytosis. CRP 9.3 and ESR 49. X-ray of right ankle on admit showed Orthopedic hardware intact with no acute fracture or soft tissue swelling.    - Orthopedics consulted in ED:       - Wound irrigated and soft dressings applied in ED.  - Patient non weight bearing to right lower extremity as per Ortho.   - Multimodal pain regimen. Avoid Tylenol as AST and ALT elevated. Oxycodone IR po prn for breakthrough pain.   - Plan for irrigation and debridement of right ankle wound in OR today (9/25/24) with Dr. Hansen and Orthopedics with vac placed and full op note still pending from 9/25.  -vanc/rocephin for coverage with serosanginous drainage reported and ortho note reports ID consulted but they were not so consulted 9/27 given ortho feels concern for infectious given this with hardware exposed. ID changed to dapto/rocephin on 9/28 and appreciate assistance. Rocephin stopped 9/28 per ID  Final ID recs- dapto.rocephin until 11/7 with weekly labs, PICC.   Discussion with jade and ID Dr Dhaliwal and ortho discussed more of findings intra-op with team and given no cultures growth preferred oral if possible and so ID feels okay and will adjust recs and awaiting new recs now 10/1.  -NWB until at least 11/1 per ortho-   -prevena wound vac for a week  -was on eliquis until oct 26th post pelvic/ankel fx for ppx, okay to resume per ortho and resumed 9/28. Continue with prev end date per dr hansen  Rec for SNF but she declines as out of days and misses her cats and had bad experience. Family aware

## 2024-10-02 PROBLEM — R19.7 DIARRHEA: Status: RESOLVED | Noted: 2024-09-27 | Resolved: 2024-10-02

## 2024-10-02 LAB
ALBUMIN SERPL BCP-MCNC: 2.1 G/DL (ref 3.5–5.2)
ALP SERPL-CCNC: 235 U/L (ref 55–135)
ALT SERPL W/O P-5'-P-CCNC: 39 U/L (ref 10–44)
ANION GAP SERPL CALC-SCNC: 7 MMOL/L (ref 8–16)
AST SERPL-CCNC: 50 U/L (ref 10–40)
BACTERIA SPEC ANAEROBE CULT: NORMAL
BACTERIA SPEC ANAEROBE CULT: NORMAL
BILIRUB SERPL-MCNC: 0.5 MG/DL (ref 0.1–1)
BUN SERPL-MCNC: 24 MG/DL (ref 8–23)
CALCIUM SERPL-MCNC: 8.6 MG/DL (ref 8.7–10.5)
CHLORIDE SERPL-SCNC: 98 MMOL/L (ref 95–110)
CO2 SERPL-SCNC: 31 MMOL/L (ref 23–29)
CREAT SERPL-MCNC: 1.6 MG/DL (ref 0.5–1.4)
ERYTHROCYTE [DISTWIDTH] IN BLOOD BY AUTOMATED COUNT: 15 % (ref 11.5–14.5)
EST. GFR  (NO RACE VARIABLE): 33.8 ML/MIN/1.73 M^2
FUNGUS SPEC CULT: NORMAL
GLUCOSE SERPL-MCNC: 118 MG/DL (ref 70–110)
HCT VFR BLD AUTO: 29.7 % (ref 37–48.5)
HGB BLD-MCNC: 9.1 G/DL (ref 12–16)
MAGNESIUM SERPL-MCNC: 2.2 MG/DL (ref 1.6–2.6)
MCH RBC QN AUTO: 28 PG (ref 27–31)
MCHC RBC AUTO-ENTMCNC: 30.6 G/DL (ref 32–36)
MCV RBC AUTO: 91 FL (ref 82–98)
PHOSPHATE SERPL-MCNC: 2.7 MG/DL (ref 2.7–4.5)
PLATELET # BLD AUTO: 261 K/UL (ref 150–450)
PMV BLD AUTO: 12.1 FL (ref 9.2–12.9)
POCT GLUCOSE: 106 MG/DL (ref 70–110)
POCT GLUCOSE: 128 MG/DL (ref 70–110)
POCT GLUCOSE: 152 MG/DL (ref 70–110)
POCT GLUCOSE: 203 MG/DL (ref 70–110)
POTASSIUM SERPL-SCNC: 3.8 MMOL/L (ref 3.5–5.1)
PROT SERPL-MCNC: 6 G/DL (ref 6–8.4)
RBC # BLD AUTO: 3.25 M/UL (ref 4–5.4)
SODIUM SERPL-SCNC: 136 MMOL/L (ref 136–145)
WBC # BLD AUTO: 12.37 K/UL (ref 3.9–12.7)

## 2024-10-02 PROCEDURE — 25000003 PHARM REV CODE 250: Mod: HCNC | Performed by: NURSE PRACTITIONER

## 2024-10-02 PROCEDURE — 25000003 PHARM REV CODE 250: Mod: HCNC | Performed by: INTERNAL MEDICINE

## 2024-10-02 PROCEDURE — 25000003 PHARM REV CODE 250: Mod: HCNC | Performed by: HOSPITALIST

## 2024-10-02 PROCEDURE — 85027 COMPLETE CBC AUTOMATED: CPT | Mod: HCNC | Performed by: HOSPITALIST

## 2024-10-02 PROCEDURE — 97530 THERAPEUTIC ACTIVITIES: CPT | Mod: HCNC,CO

## 2024-10-02 PROCEDURE — 84100 ASSAY OF PHOSPHORUS: CPT | Mod: HCNC | Performed by: HOSPITALIST

## 2024-10-02 PROCEDURE — 25000003 PHARM REV CODE 250: Mod: HCNC

## 2024-10-02 PROCEDURE — 63600175 PHARM REV CODE 636 W HCPCS: Mod: HCNC

## 2024-10-02 PROCEDURE — 80053 COMPREHEN METABOLIC PANEL: CPT | Mod: HCNC | Performed by: HOSPITALIST

## 2024-10-02 PROCEDURE — 97535 SELF CARE MNGMENT TRAINING: CPT | Mod: HCNC,CO

## 2024-10-02 PROCEDURE — 11000001 HC ACUTE MED/SURG PRIVATE ROOM: Mod: HCNC

## 2024-10-02 PROCEDURE — 97110 THERAPEUTIC EXERCISES: CPT | Mod: HCNC,CQ

## 2024-10-02 PROCEDURE — 83735 ASSAY OF MAGNESIUM: CPT | Mod: HCNC | Performed by: HOSPITALIST

## 2024-10-02 PROCEDURE — 27000207 HC ISOLATION: Mod: HCNC

## 2024-10-02 PROCEDURE — 36415 COLL VENOUS BLD VENIPUNCTURE: CPT | Mod: HCNC | Performed by: HOSPITALIST

## 2024-10-02 RX ORDER — METHOCARBAMOL 500 MG/1
500 TABLET, FILM COATED ORAL 4 TIMES DAILY
Qty: 65 TABLET | Refills: 1 | Status: SHIPPED | OUTPATIENT
Start: 2024-10-02 | End: 2024-10-09

## 2024-10-02 RX ORDER — QUETIAPINE FUMARATE 50 MG/1
50 TABLET, FILM COATED ORAL NIGHTLY PRN
Qty: 30 TABLET | Refills: 2 | Status: SHIPPED | OUTPATIENT
Start: 2024-10-02 | End: 2025-10-02

## 2024-10-02 RX ORDER — ONDANSETRON 8 MG/1
8 TABLET, ORALLY DISINTEGRATING ORAL EVERY 8 HOURS PRN
Qty: 20 TABLET | Refills: 2 | Status: SHIPPED | OUTPATIENT
Start: 2024-10-02

## 2024-10-02 RX ORDER — ALBUTEROL SULFATE 90 UG/1
2 INHALANT RESPIRATORY (INHALATION) EVERY 6 HOURS PRN
Qty: 18 G | Refills: 1 | Status: SHIPPED | OUTPATIENT
Start: 2024-10-02

## 2024-10-02 RX ORDER — HYDROXYZINE HYDROCHLORIDE 25 MG/1
25 TABLET, FILM COATED ORAL 3 TIMES DAILY PRN
Qty: 60 TABLET | Refills: 1 | Status: SHIPPED | OUTPATIENT
Start: 2024-10-02

## 2024-10-02 RX ADMIN — TICAGRELOR 90 MG: 90 TABLET ORAL at 09:10

## 2024-10-02 RX ADMIN — METHOCARBAMOL 500 MG: 500 TABLET ORAL at 01:10

## 2024-10-02 RX ADMIN — ISOSORBIDE MONONITRATE 60 MG: 30 TABLET, EXTENDED RELEASE ORAL at 09:10

## 2024-10-02 RX ADMIN — VANCOMYCIN HYDROCHLORIDE 125 MG: KIT at 12:10

## 2024-10-02 RX ADMIN — SUCRALFATE 1 G: 1 SUSPENSION ORAL at 06:10

## 2024-10-02 RX ADMIN — VANCOMYCIN HYDROCHLORIDE 125 MG: KIT at 05:10

## 2024-10-02 RX ADMIN — OXYCODONE HYDROCHLORIDE 5 MG: 5 TABLET ORAL at 09:10

## 2024-10-02 RX ADMIN — METOPROLOL SUCCINATE 25 MG: 25 TABLET, EXTENDED RELEASE ORAL at 09:10

## 2024-10-02 RX ADMIN — METHOCARBAMOL 500 MG: 500 TABLET ORAL at 04:10

## 2024-10-02 RX ADMIN — APIXABAN 2.5 MG: 2.5 TABLET, FILM COATED ORAL at 09:10

## 2024-10-02 RX ADMIN — SUCRALFATE 1 G: 1 SUSPENSION ORAL at 05:10

## 2024-10-02 RX ADMIN — OXYCODONE HYDROCHLORIDE 5 MG: 5 TABLET ORAL at 04:10

## 2024-10-02 RX ADMIN — HYDRALAZINE HYDROCHLORIDE 100 MG: 50 TABLET ORAL at 09:10

## 2024-10-02 RX ADMIN — FLUOXETINE HYDROCHLORIDE 40 MG: 20 CAPSULE ORAL at 09:10

## 2024-10-02 RX ADMIN — QUETIAPINE FUMARATE 50 MG: 25 TABLET ORAL at 09:10

## 2024-10-02 RX ADMIN — Medication 6 MG: at 09:10

## 2024-10-02 RX ADMIN — INSULIN GLARGINE 16 UNITS: 100 INJECTION, SOLUTION SUBCUTANEOUS at 09:10

## 2024-10-02 RX ADMIN — ALUMINUM HYDROXIDE, MAGNESIUM HYDROXIDE, AND SIMETHICONE 30 ML: 1200; 120; 1200 SUSPENSION ORAL at 09:10

## 2024-10-02 RX ADMIN — DOXYCYCLINE HYCLATE 100 MG: 100 TABLET, COATED ORAL at 09:10

## 2024-10-02 RX ADMIN — FUROSEMIDE 40 MG: 40 TABLET ORAL at 09:10

## 2024-10-02 RX ADMIN — HYDROCORTISONE: 25 CREAM TOPICAL at 09:10

## 2024-10-02 RX ADMIN — METHOCARBAMOL 500 MG: 500 TABLET ORAL at 09:10

## 2024-10-02 RX ADMIN — ALUMINUM HYDROXIDE, MAGNESIUM HYDROXIDE, AND SIMETHICONE 30 ML: 1200; 120; 1200 SUSPENSION ORAL at 12:10

## 2024-10-02 RX ADMIN — HYDRALAZINE HYDROCHLORIDE 100 MG: 50 TABLET ORAL at 06:10

## 2024-10-02 RX ADMIN — FUROSEMIDE 40 MG: 40 TABLET ORAL at 05:10

## 2024-10-02 RX ADMIN — VANCOMYCIN HYDROCHLORIDE 125 MG: KIT at 06:10

## 2024-10-02 RX ADMIN — ALUMINUM HYDROXIDE, MAGNESIUM HYDROXIDE, AND SIMETHICONE 30 ML: 1200; 120; 1200 SUSPENSION ORAL at 06:10

## 2024-10-02 RX ADMIN — ALUMINUM HYDROXIDE, MAGNESIUM HYDROXIDE, AND SIMETHICONE 30 ML: 1200; 120; 1200 SUSPENSION ORAL at 04:10

## 2024-10-02 RX ADMIN — HYDRALAZINE HYDROCHLORIDE 100 MG: 50 TABLET ORAL at 01:10

## 2024-10-02 RX ADMIN — SUCRALFATE 1 G: 1 SUSPENSION ORAL at 12:10

## 2024-10-02 RX ADMIN — INSULIN ASPART 1 UNITS: 100 INJECTION, SOLUTION INTRAVENOUS; SUBCUTANEOUS at 09:10

## 2024-10-02 NOTE — PLAN OF CARE
Problem: Adult Inpatient Plan of Care  Goal: Absence of Hospital-Acquired Illness or Injury  Outcome: Progressing     Problem: Adult Inpatient Plan of Care  Goal: Readiness for Transition of Care  Outcome: Progressing     Problem: Infection  Goal: Absence of Infection Signs and Symptoms  Outcome: Progressing     Problem: Wound  Goal: Optimal Pain Control and Function  Outcome: Progressing     Problem: Wound  Goal: Skin Health and Integrity  Outcome: Progressing     Problem: Fall Injury Risk  Goal: Absence of Fall and Fall-Related Injury  Outcome: Progressing    Pt's plan of care is progressing. Pt is currently resting comfortably. Safety measures remain in place. Pain level is under control with prn and scheduled medications. . Skin integrity is  being monitored and maintained with prn cream.  Pt is still on contact precautions for C.Diff. Surgical incision is clean, dry, and intact with no signs of infection. Wound vac is in place and output is monitored. Call light within reach. Pt has no complaints or concerns at this time. Plan of care ongoing.

## 2024-10-02 NOTE — ASSESSMENT & PLAN NOTE
Lorena Contreras is a 73 y.o. female s/p ORIF right pilon fx presents with right medial ankle wound dehiscence s/p I&D R ankle 9/25/24.    Plan:  - Admitted to    - Abx: ID recs for dapto and rocephin until 11/7/24  - Cultures NGTD  - NWB RLE x 10 weeks from date of ORIF (8/14/24)  - Prevena at 75mmHg, f/u 1 week from discharge for removal  - hospital VAC switched to Prevena battery VAC

## 2024-10-02 NOTE — ASSESSMENT & PLAN NOTE
Active on exam 10/1 and new, on robaxin PRN and will trial valium x 1 to see if can break spasm  improved

## 2024-10-02 NOTE — PT/OT/SLP PROGRESS
"Physical Therapy Treatment    Patient Name:  Lorena Contreras   MRN:  [id number]    Recommendations:     Discharge Recommendations: Moderate Intensity Therapy  Discharge Equipment Recommendations: none  Barriers to discharge: None    Assessment:     Lorena Contreras is a 73 y.o. female admitted with a medical diagnosis of Wound dehiscence.  She presents with the following impairments/functional limitations: weakness, impaired endurance, impaired self care skills, impaired functional mobility, gait instability, impaired balance, decreased lower extremity function, pain, orthopedic precautions .  Pt agreeable for therapy, reports having burning sensation to bottom due to increased occurrence of diarrhea, worked on exercises in bed, pt reports feeling comfortable w/bed <> WC transfer w/slideboard as she has done it for the last month    Rehab Prognosis: Good; patient would benefit from acute skilled PT services to address these deficits and reach maximum level of function.    Recent Surgery: Procedure(s) (LRB):  IRRIGATION AND DEBRIDEMENT, LOWER EXTREMITY; slider table, supine, bone foam, cysto tubing, 6L NS/dakins/peroxide, culture swabs (Right)  APPLICATION, WOUND VAC (Right)  DEBRIDEMENT, LOCAL FLAP (Right) 7 Days Post-Op    Plan:     During this hospitalization, patient to be seen 4 x/week to address the identified rehab impairments via gait training, therapeutic activities, therapeutic exercises, neuromuscular re-education and progress toward the following goals:    Plan of Care Expires:  11/26/24    Subjective     Chief Complaint: burning sensation to bottom  Patient/Family Comments/goals: "my butt is raw"  Pain/Comfort:  Pain Rating 1: other (see comments) (unrated, pt having burning sensation in bottom from raw bottom due to increased diahrrhea)  Pain Rating Post-Intervention 1: other (see comments) (unrated; raw bottom)      Objective:     Communicated with RN prior to session.  Patient found supine with " wound vac, PureWick, peripheral IV upon PT entry to room.     General Precautions: Standard, fall  Orthopedic Precautions: RLE non weight bearing  Braces: N/A  Respiratory Status: Room air     Functional Mobility:    Bed Mobility:   Rolling: to R side with stand by assistance  Supine > Sit: stand by assistance  Sit > Supine: stand by assistance      Balance:   Sitting balance: GOOD: Maintains balance through MODERATE excursions of active trunk movement; pt unable to maintain sitting due to increased pain in bottom, sat for 30s before returning to supine                       AM-PAC 6 CLICK MOBILITY  Turning over in bed (including adjusting bedclothes, sheets and blankets)?: 3  Sitting down on and standing up from a chair with arms (e.g., wheelchair, bedside commode, etc.): 2  Moving from lying on back to sitting on the side of the bed?: 3  Moving to and from a bed to a chair (including a wheelchair)?: 2  Need to walk in hospital room?: 1  Climbing 3-5 steps with a railing?: 1  Basic Mobility Total Score: 12       Treatment & Education:  Education provided regarding PT role and PoC to increase personal mobility and strength to improve safety of SB transfers  Exercises:   heel slides x 10, mod A to maintain proper alignment to RLE  SLR x 10, mod A for lift assist, RLE    Patient left supine with all lines intact and call button in reach..    GOALS:   Multidisciplinary Problems       Physical Therapy Goals          Problem: Physical Therapy    Goal Priority Disciplines Outcome Interventions   Physical Therapy Goal     PT, PT/OT Progressing    Description: Goals to be met by: 10/26/2024     Patient will increase functional independence with mobility by performin. Supine to sit with Modified Chancellor  2. Sit to supine with Modified Chancellor  3. Sit to stand transfer with Minimal Assistance  4. Bed to chair transfer with Minimal Assistance using LRAD  5. Gait  x 5 feet with Moderate Assistance using LRAD.    6. Wheelchair propulsion x100 feet with Supervision using bilateral upper extremities  7. Pt to transfer bed to/from w/c with sliding board with minimal assist with NWB R LE                         Time Tracking:     PT Received On: 10/02/24  PT Start Time: 0929     PT Stop Time: 0946  PT Total Time (min): 17 min     Billable Minutes: Therapeutic Exercise 17min    Treatment Type: Treatment  PT/PTA: PTA     Number of PTA visits since last PT visit: 2     10/02/2024

## 2024-10-02 NOTE — PROGRESS NOTES
[facility]  Orthopedics  Progress Note    Patient Name: Lorena Contreras  MRN: [id number]  Admission Date: 9/24/2024  Hospital Length of Stay: 7 days  Attending Provider: Rupal Ochoa MD  Primary Care Provider: Jorge Paris MD  Follow-up For: Procedure(s) (LRB):  IRRIGATION AND DEBRIDEMENT, LOWER EXTREMITY; slider table, supine, bone foam, cysto tubing, 6L NS/dakins/peroxide, culture swabs (Right)  APPLICATION, WOUND VAC (Right)  DEBRIDEMENT, LOCAL FLAP (Right)    Post-Operative Day: 7 Days Post-Op  Subjective:     Principal Problem:Wound dehiscence    Principal Orthopedic Problem: as above, s/p I&D right ankle 9/25    Interval History: Patient seen and examined at bedside. NAEON. VSS, afebrile.  Patient worked with PT today. Final ID recs in - pending resolution of diarrhea and C. Diff. plan for DC tomorrow with home health.  Wound VAC switched from hospital vac to Prevena battery vac. we will remove Prevena in 1 week.       Review of patient's allergies indicates:   Allergen Reactions    Novolin 70/30 (semi-synthetic) Nausea And Vomiting     Severe vomiting on Relion 70/30    Sulfa (sulfonamide antibiotics) Anaphylaxis    Talwin [pentazocine lactate] Anaphylaxis    Victoza [liraglutide] Nausea And Vomiting    Glipizide Nausea Only    Citric acid     Codeine     Influenza virus vaccines Hives    Iodine and iodide containing products Hives    Levetiracetam Itching    Neurontin [gabapentin]      Possible associated myoclonic jerk    Rituxan [rituximab] Hives    Zoloft [sertraline] Nausea And Vomiting       Current Facility-Administered Medications   Medication    0.9%  NaCl infusion (for blood administration)    albuterol-ipratropium 2.5 mg-0.5 mg/3 mL nebulizer solution 3 mL    aluminum-magnesium hydroxide-simethicone 200-200-20 mg/5 mL suspension 30 mL    aluminum-magnesium hydroxide-simethicone 200-200-20 mg/5 mL suspension 30 mL    apixaban tablet 2.5 mg    dextrose 10% bolus 125 mL 125 mL  "   dextrose 10% bolus 250 mL 250 mL    doxycycline tablet 100 mg    FLUoxetine capsule 40 mg    furosemide tablet 40 mg    glucagon (human recombinant) injection 1 mg    glucose chewable tablet 16 g    glucose chewable tablet 24 g    hydrALAZINE tablet 100 mg    hydrocortisone 2.5 % rectal cream    hydrOXYzine HCL tablet 25 mg    insulin aspart U-100 pen 0-5 Units    insulin glargine U-100 (Lantus) pen 16 Units    isosorbide mononitrate 24 hr tablet 60 mg    melatonin tablet 6 mg    methocarbamoL tablet 500 mg    metoprolol succinate (TOPROL-XL) 24 hr tablet 25 mg    naloxone 0.4 mg/mL injection 0.02 mg    ondansetron tablet 4 mg    oxyCODONE immediate release tablet 5 mg    polyethylene glycol packet 17 g    prochlorperazine injection Soln 5 mg    QUEtiapine tablet 50 mg    simethicone chewable tablet 80 mg    sodium chloride 0.9% flush 5 mL    sucralfate 100 mg/mL suspension 1 g    ticagrelor tablet 90 mg    vancomycin 125 mg/5 mL oral solution 125 mg     Objective:     Vital Signs (Most Recent):  Temp: 98.3 °F (36.8 °C) (10/02/24 1532)  Pulse: 94 (10/02/24 1532)  Resp: 18 (10/02/24 1634)  BP: (!) 145/67 (10/02/24 1532)  SpO2: 98 % (10/02/24 1532) Vital Signs (24h Range):  Temp:  [97 °F (36.1 °C)-98.6 °F (37 °C)] 98.3 °F (36.8 °C)  Pulse:  [88-96] 94  Resp:  [18-20] 18  SpO2:  [96 %-99 %] 98 %  BP: (141-176)/(63-76) 145/67     Weight: 85.4 kg (188 lb 4.4 oz)  Height: 5' 10" (177.8 cm)  Body mass index is 27.01 kg/m².    No intake or output data in the 24 hours ending 10/02/24 1736       Ortho/SPM Exam  A&O x 3  Regular Rate  Non-Labored Respirations    RLE:  Wound vac with good seal  Fires Quad/TA/EHL/GSC  SILT  Compartments soft  Brisk cap refill         Significant Labs: All pertinent labs within the past 24 hours have been reviewed.    Significant Imaging: I have reviewed all pertinent imaging results/findings.  Assessment/Plan:     * Wound dehiscence, right ankle  Lorena Contreras is a 73 y.o. female s/p " ORIF right pilon fx presents with right medial ankle wound dehiscence s/p I&D R ankle 9/25/24.    Plan:  - Admitted to    - Abx: ID recs for dapto and rocephin until 11/7/24  - Cultures NGTD  - NWB RLE x 10 weeks from date of ORIF (8/14/24)  - Prevena at 75mmHg, f/u 1 week from discharge for removal  - hospital VAC switched to Prevena battery VAC            MARISA Marroquin MD  Orthopedics  [facility] - Surgery

## 2024-10-02 NOTE — PT/OT/SLP PROGRESS
Occupational Therapy   Treatment    Name: Lorena Contreras  MRN: [id number]  Admitting Diagnosis:  Wound dehiscence  7 Days Post-Op    Recommendations:     Discharge Recommendations: Moderate Intensity Therapy  Discharge Equipment Recommendations:  none  Barriers to discharge:       Assessment:     Lorena Contreras is a 73 y.o. female with a medical diagnosis of Wound dehiscence.  She presents with the following performance deficits affecting function: weakness, impaired endurance, impaired functional mobility, gait instability, impaired balance, decreased lower extremity function, pain, orthopedic precautions.     Rehab Prognosis:  Good; patient would benefit from acute skilled OT services to address these deficits and reach maximum level of function.       Plan:     Patient to be seen 4 x/week to address the above listed problems via self-care/home management, therapeutic activities, therapeutic exercises, neuromuscular re-education  Plan of Care Expires: 10/26/24  Plan of Care Reviewed with: patient    Subjective     Chief Complaint: pain in R ankle  Patient/Family Comments/goals: to improve function  Pain/Comfort:  Pain Rating 1: 6/10  Location - Side 1: Right  Location - Orientation 1: generalized  Location 1: ankle  Pain Addressed 1: Reposition, Distraction  Pain Rating Post-Intervention 1:  (unrated)    Objective:     Communicated with: RN prior to session.  Patient found HOB elevated with   upon OT entry to room.  A client care conference was completed by the OTR and the GRAF prior to treatment by the GRAF to discuss the patient's POC and current status.    General Precautions: Standard, fall    Orthopedic Precautions:RLE non weight bearing  Braces: N/A  Respiratory Status: Nasal cannula, flow 1 L/min     Occupational Performance:     Bed Mobility:    Patient completed Rolling/Turning to Left with  stand by assistance  Patient completed Rolling/Turning to Right with stand by assistance  Patient  completed Scooting/Bridging with contact guard assistance  Patient completed Supine to Sit with contact guard assistance  Patient completed Sit to Supine with minimum assistance     Functional Mobility/Transfers:  Not performed    Activities of Daily Living:  Grooming: stand by assistance for oral hygiene and facial care seated EOB  Toileting: total assistance for pericare and clothing mgmt      [facility] 6 Click ADL: 17    Treatment & Education:  Pt educated on OT POC and frequency during hospital stay.   Pt educated on proper hand placement and techniques for RW mgmt to improve safety awareness.   Seated EOB, pt completed BUE therex (y66ozly each)  - Straight Arm Raises  - Bicep Curls  - chest press  Pt educated on importance of OOB activity to improve function and activity tolerance.  Addressed all patient questions/concerns within GRAF scope of practice.     Patient left HOB elevated with all lines intact, call button in reach, and RN notified    GOALS:   Multidisciplinary Problems       Occupational Therapy Goals          Problem: Occupational Therapy    Goal Priority Disciplines Outcome Interventions   Occupational Therapy Goal     OT, PT/OT Progressing    Description: Goals to be met by: 10/26/24     Patient will increase functional independence with ADLs by performing:    LE Dressing with Supervision.  Grooming while standing at sink with Supervision.  T/f with slideboard with CGA  Toileting from toilet with Supervision for hygiene and clothing management.   Supine to sit with Supervision.                         Time Tracking:     OT Date of Treatment: 10/02/24  OT Start Time: 1443  OT Stop Time: 1519  OT Total Time (min): 36 min    Billable Minutes:Self Care/Home Management 20  Therapeutic Activity 16               10/2/2024

## 2024-10-02 NOTE — ASSESSMENT & PLAN NOTE
Closed right pilon fracture s/p ORIF on 8/14/2024  74 yo female with history of a fall resulting in a pilon fracture to the right ankle on 8/14/24 s/p ORIF. Sutures removed on 09/12/2024 in Ortho clinic. Presented to hospital for swelling and wound dehiscence to right ankle. No leukocytosis. CRP 9.3 and ESR 49. X-ray of right ankle on admit showed Orthopedic hardware intact with no acute fracture or soft tissue swelling.    - Orthopedics consulted in ED:       - Wound irrigated and soft dressings applied in ED.  - Patient non weight bearing to right lower extremity as per Ortho.   - Multimodal pain regimen. Avoid Tylenol as AST and ALT elevated. Oxycodone IR po prn for breakthrough pain.   - Plan for irrigation and debridement of right ankle wound in OR today (9/25/24) with Dr. Hansen and Orthopedics with vac placed and full op note still pending from 9/25.  -vanc/rocephin for coverage with serosanginous drainage reported and ortho note reports ID consulted but they were not so consulted 9/27 given ortho feels concern for infectious given this with hardware exposed. ID changed to dapto/rocephin on 9/28 and appreciate assistance. Rocephin stopped 9/28 per ID  Final ID recs- dapto.rocephin until 11/7 with weekly labs, PICC.   Discussion with jade and ID Dr Dhaliwal and ortho discussed more of findings intra-op with team and given no cultures growth preferred oral if possible and so ID feels okay and will adjust recs and awaiting new recs now 10/1 and rec for doxy BID when discussed  -NWB until at least 11/1 per ortho-   -prevena wound vac for a week- and ortho confirms to keep for  1 week on discharge.  -was on eliquis until oct 26th post pelvic/ankel fx for ppx, okay to resume per ortho and resumed 9/28. Continue with prev end date per dr hansen  Rec for SNF but she declines as out of days and misses her cats and had bad experience. Family aware

## 2024-10-02 NOTE — ASSESSMENT & PLAN NOTE
+ after copious diarrhea on 9/28 and oral vanc started, lomotil stpped due to toxic megacolon risk with anti-diarrhea meds  -based on history, was present on admit as the diarrehea started the day she was admitted, and was just at SNF the last month and exposure appears to be from there.  -per ID - cont full dose therapy 4 x a day oral vanc for 10 adys then do BID while on antibiotics until complete 11/7, calculated comes out to 108 pills   She reports diarrhea is seemingly worse 10/2 and at home they didn't plan for this to be an issue with sitters so will need improvement + night sitters set up to be safe at home as cannto wipe or turn herself with the frequency of stools she's stilll having as set up for day sittesr only right now so family working on this per discsusion 10/2  Discussed with her and daughter reasons why can't be on lomotil/immmodium right now until complete full c diff treatment

## 2024-10-02 NOTE — SUBJECTIVE & OBJECTIVE
Interval History: she was anxious this morning and having some tingling of lips and pressured speech and suspect more anxious while watching [employer] coverage and anxious abotu discharge as has baseline anxiety she takes ativan prn for. Her muscle spasms like yesterday are improved as no visible spasms on exam but she said she's still having some ticking/tremors. She did have tremors last admit present on admit and has seen neuro for who ruled out parkinsons and we stopped gabapentin last admit as a possible cause of so is not unusual for her to exhibit a tremor at time and it is much better than last admit as last admit had resting tremors. She reports having still very frequent loose stools/diarrhea. Daughter on phone with me during exam as well. She said its not her holding it and then the same BM just coming out later that she told me it was in previous days that its jsut watery and loose throughout the day and helga up all night and needs changign now due to this. She feels like it needs to improve before she goes home given the frequent need for assistance for wiping/changing due to this. Her daughter reports that they have sitters in day and family that helps and she's 10 mintues away but didn't anticiapte needing assistance at night and the frequent stools that would likely need nightly aids so she's going to look into probably hiring a night aid as well for this given snf rec for fall risk but she declined SNF and with this many BMs she's reporting would likely not be able to be at home without being changed/wiped overnight with what she's telling me right now for safety cocnrns as also dont want her sitting in dairrhea all night given her uti present on admit and already treated. Changed to doxy BID yesterday per ID recs. Her daughter gave me a list of meds she has at home to make sure we have the meds at home given they prescribed some when she left SNF that we staretd at that admit  -she has- insulin,  ozempic, metoprolol, isorbide, lipitor, lassix, prozac, brillinta, 36 oxy, 26 ativan.   We adjusted laix, are holding lipitor on discharge, and adding bp meds, antibiotics, have eliquis until 10/26 so will send the meds that are missing from above to our pharm downstiars.  Updated daryn with CM abotu above as likely will need to have BM improvement/night sitters set up for her for discharge with HH so I told her likely tomorrow for dc then and that will have it all ready and dr mcgee will bhe here tomorrow to evaluate and assess for this   Ortho reports vac to stay on 1 week on dc       Review of Systems   Constitutional:  Negative for chills and fever.   Respiratory:  Negative for cough and shortness of breath.    Cardiovascular:  Negative for chest pain, palpitations and leg swelling.   Gastrointestinal:  Negative for abdominal pain, nausea and vomiting.   Genitourinary:  Negative for difficulty urinating.   Musculoskeletal:  Negative for arthralgias, joint swelling and myalgias.   Skin:  Positive for wound (Right ankle surgical wound).   Neurological:  Positive for weakness (Generalized). Negative for dizziness and light-headedness.   Psychiatric/Behavioral:  Negative for confusion and hallucinations.      Objective:     Vital Signs (Most Recent):  Temp: 97.8 °F (36.6 °C) (09/25/24 0731)  Pulse: 97 (09/25/24 1122)  Resp: 18 (09/25/24 0731)  BP: 157/69 (09/25/24 0731)  SpO2: 93 % (09/25/24 0731) on room air Vital Signs (24h Range):  Temp:  [97.9 °F (36.6 °C)-98.6 °F (37 °C)] 98.6 °F (37 °C)  Pulse:  [83-98] 96  Resp:  [16-20] 20  SpO2:  [89 %-99 %] 99 %  BP: (141-178)/(65-74) 175/70     Weight: 85.4 kg (188 lb 4.4 oz)  Body mass index is 27.01 kg/m².  No intake or output data in the 24 hours ending 10/02/24 1109        Physical Exam  Vitals and nursing note reviewed.   Constitutional:       General: She is awake. She is not in acute distress.     Appearance: Normal appearance. She is normal weight. She is not  "ill-appearing.      Comments: Anxious on exam. Daugher on phone. Patient awake and alert and oriented x 4.    Eyes:      Conjunctiva/sclera: Conjunctivae normal.   Cardiovascular:      Rate and Rhythm: Normal rate and regular rhythm.      Heart sounds: Normal heart sounds. No murmur heard.     No friction rub. No gallop.   Pulmonary:      Effort: Pulmonary effort is normal. No respiratory distress.      Breath sounds: Normal breath sounds. No wheezing.      Comments: On 1 L oxygen, no dyspnea  Abdominal:      General: Abdomen is flat. Bowel sounds are normal. There is no distension.      Palpations: Abdomen is soft.      Tenderness: There is no abdominal tenderness.   Musculoskeletal:      Right lower leg: No edema.      Left lower leg: No edema.      Comments: Right ankle wound vac   Skin:     General: Skin is warm.      Findings: No erythema.      Comments: Bluish coloration to groin areas related to application of Gentian violet   Neurological:      Mental Status: She is alert and oriented to person, place, and time.   Psychiatric:         Mood and Affect: Mood normal.         Behavior: Behavior normal. Behavior is cooperative.         Thought Content: Thought content normal.         Judgment: Judgment normal.             Significant Labs: A1C:   Recent Labs   Lab 07/10/24  1105   HGBA1C 8.2*     CBC:   Recent Labs   Lab 10/01/24  0408 10/02/24  0335   WBC 11.40 12.37   HGB 8.7* 9.1*   HCT 27.7* 29.7*    261     CMP:   Recent Labs   Lab 10/01/24  0408 10/02/24  0335   * 136   K 3.4* 3.8   CL 97 98   CO2 30* 31*   * 118*   BUN 27* 24*   CREATININE 1.7* 1.6*   CALCIUM 8.4* 8.6*   PROT 5.7* 6.0   ALBUMIN 2.0* 2.1*   BILITOT 0.2 0.5   ALKPHOS 237* 235*   AST 41* 50*   ALT 35 39   ANIONGAP 8 7*     Cardiac Markers:   No results for input(s): "CKMB", "MYOGLOBIN", "BNP", "TROPISTAT" in the last 48 hours.      Magnesium:   Recent Labs   Lab 10/01/24  0408 10/02/24  0335   MG 2.3 2.2     POCT Glucose: " "  Recent Labs   Lab 10/01/24  1528 10/01/24  2039 10/02/24  0759   POCTGLUCOSE 153* 154* 106     Urine Studies:   No results for input(s): "COLORU", "APPEARANCEUA", "PHUR", "SPECGRAV", "PROTEINUA", "GLUCUA", "KETONESU", "BILIRUBINUA", "OCCULTUA", "NITRITE", "UROBILINOGEN", "LEUKOCYTESUR", "RBCUA", "WBCUA", "BACTERIA", "SQUAMEPITHEL", "HYALINECASTS" in the last 48 hours.    Invalid input(s): "WRIGHTSUR"      Significant Imaging: I have reviewed all pertinent imaging results/findings within the past 24 hours.  "

## 2024-10-02 NOTE — PROGRESS NOTES
"[facility] Medicine  Progress Note    Patient Name: Lorena Contreras  MRN: [id number]  Patient Class: IP- Inpatient   Admission Date: 9/24/2024  Length of Stay: 7 days  Attending Physician: Rupal Ochoa MD  Primary Care Provider: Jorge Paris MD        Subjective:     Principal Problem:Wound dehiscence        HPI:  Lorena Contreras is a 72 yo F with PMHx of  DM2, Fibromyalgia, lymphoma s/p chemo, HTN, HLD, CVA, MI, CAD s/p PCIs, CKD3b, HFpEF, and anemia who presented to ED for R ankle wound dehiscence. The patient reports that on 8/14/24, she was walking into a [employer] when she lost her balance, fell, and twisted her ankle. She sustained a pelvic fracture and a right pilon fracture, requiring surgery at [facility] later that day. Following the fall, she experienced urinary retention and had an indwelling catheter placed. While at the SNF, the patient reports significant progress, noting that her injuries were healing well, and she regained some mobility. Her wound was evaluated, and sutures were removed on 9/12/24. The patient was discharged home four days ago but describes her experience as "miserable," citing a lack of mobility aids provided for home use. At SNF, she had not been bearing weight on her RLE. She reports while attempting to stand on her RLE with home health her injury opened up. Since then, she has had increased swelling to her R ankle and associated generalized weakness and fatigue, decreased appetite, constipation, SOB (when anxious) and nausea. She denies fevers, chills, rhinorrhea, sore throat, cough, hemoptysis, chest pain, vomiting, diarrhea, or blood in her stool.     ED Course: AFVSS. CBC significant hgb 7.9 (down from 11.8 on month ago). No leukocytosis. CMP with Cr 2.0 (at baseline), transaminitis , , and . BG initially 232, but now 91. CRP 93 and ESR 49. Lactate 0.91. R ankle XR with stable hardware and no acute fx or dislocation, but " concerning for cellulitis. Ortho consulted and planning for I&D in AM. Given IV zofran. Placed in observation with HM.       Overview/Hospital Course:  Orthopedics consulted and patient being taken to OR today for incision and drainage and washout of right ankle surgical site for wound dehiscence. Patient with no obvious clinical signs of infection.     Interval History: she was anxious this morning and having some tingling of lips and pressured speech and suspect more anxious while watching hurrcane coverage and anxious abotu discharge as has baseline anxiety she takes ativan prn for. Her muscle spasms like yesterday are improved as no visible spasms on exam but she said she's still having some ticking/tremors. She did have tremors last admit present on admit and has seen neuro for who ruled out parkinsons and we stopped gabapentin last admit as a possible cause of so is not unusual for her to exhibit a tremor at time and it is much better than last admit as last admit had resting tremors. She reports having still very frequent loose stools/diarrhea. Daughter on phone with me during exam as well. She said its not her holding it and then the same BM just coming out later that she told me it was in previous days that its jsut watery and loose throughout the day and helga up all night and needs changign now due to this. She feels like it needs to improve before she goes home given the frequent need for assistance for wiping/changing due to this. Her daughter reports that they have sitters in day and family that helps and she's 10 mintues away but didn't anticiapte needing assistance at night and the frequent stools that would likely need nightly aids so she's going to look into probably hiring a night aid as well for this given snf rec for fall risk but she declined SNF and with this many BMs she's reporting would likely not be able to be at home without being changed/wiped overnight with what she's telling me right now  for safety cocnrns as also dont want her sitting in dairrhea all night given her uti present on admit and already treated. Changed to doxy BID yesterday per ID recs. Her daughter gave me a list of meds she has at home to make sure we have the meds at home given they prescribed some when she left SNF that we staretd at that admit  -she has- insulin, ozempic, metoprolol, isorbide, lipitor, lassix, prozac, brillinta, 36 oxy, 26 ativan.   We adjusted laix, are holding lipitor on discharge, and adding bp meds, antibiotics, have eliquis until 10/26 so will send the meds that are missing from above to our pharm downstiars.  Updated daryn with CM abotu above as likely will need to have BM improvement/night sitters set up for her for discharge with HH so I told her likely tomorrow for dc then and that will have it all ready and dr mcgee will bhe here tomorrow to evaluate and assess for this   Ortho reports vac to stay on 1 week on dc       Review of Systems   Constitutional:  Negative for chills and fever.   Respiratory:  Negative for cough and shortness of breath.    Cardiovascular:  Negative for chest pain, palpitations and leg swelling.   Gastrointestinal:  Negative for abdominal pain, nausea and vomiting.   Genitourinary:  Negative for difficulty urinating.   Musculoskeletal:  Negative for arthralgias, joint swelling and myalgias.   Skin:  Positive for wound (Right ankle surgical wound).   Neurological:  Positive for weakness (Generalized). Negative for dizziness and light-headedness.   Psychiatric/Behavioral:  Negative for confusion and hallucinations.      Objective:     Vital Signs (Most Recent):  Temp: 97.8 °F (36.6 °C) (09/25/24 0731)  Pulse: 97 (09/25/24 1122)  Resp: 18 (09/25/24 0731)  BP: 157/69 (09/25/24 0731)  SpO2: 93 % (09/25/24 0731) on room air Vital Signs (24h Range):  Temp:  [97.9 °F (36.6 °C)-98.6 °F (37 °C)] 98.6 °F (37 °C)  Pulse:  [83-98] 96  Resp:  [16-20] 20  SpO2:  [89 %-99 %] 99 %  BP:  (141-178)/(65-74) 175/70     Weight: 85.4 kg (188 lb 4.4 oz)  Body mass index is 27.01 kg/m².  No intake or output data in the 24 hours ending 10/02/24 1109        Physical Exam  Vitals and nursing note reviewed.   Constitutional:       General: She is awake. She is not in acute distress.     Appearance: Normal appearance. She is normal weight. She is not ill-appearing.      Comments: Anxious on exam. Daugher on phone. Patient awake and alert and oriented x 4.    Eyes:      Conjunctiva/sclera: Conjunctivae normal.   Cardiovascular:      Rate and Rhythm: Normal rate and regular rhythm.      Heart sounds: Normal heart sounds. No murmur heard.     No friction rub. No gallop.   Pulmonary:      Effort: Pulmonary effort is normal. No respiratory distress.      Breath sounds: Normal breath sounds. No wheezing.      Comments: On 1 L oxygen, no dyspnea  Abdominal:      General: Abdomen is flat. Bowel sounds are normal. There is no distension.      Palpations: Abdomen is soft.      Tenderness: There is no abdominal tenderness.   Musculoskeletal:      Right lower leg: No edema.      Left lower leg: No edema.      Comments: Right ankle wound vac   Skin:     General: Skin is warm.      Findings: No erythema.      Comments: Bluish coloration to groin areas related to application of Gentian violet   Neurological:      Mental Status: She is alert and oriented to person, place, and time.   Psychiatric:         Mood and Affect: Mood normal.         Behavior: Behavior normal. Behavior is cooperative.         Thought Content: Thought content normal.         Judgment: Judgment normal.             Significant Labs: A1C:   Recent Labs   Lab 07/10/24  1105   HGBA1C 8.2*     CBC:   Recent Labs   Lab 10/01/24  0408 10/02/24  0335   WBC 11.40 12.37   HGB 8.7* 9.1*   HCT 27.7* 29.7*    261     CMP:   Recent Labs   Lab 10/01/24  0408 10/02/24  0335   * 136   K 3.4* 3.8   CL 97 98   CO2 30* 31*   * 118*   BUN 27* 24*  "  CREATININE 1.7* 1.6*   CALCIUM 8.4* 8.6*   PROT 5.7* 6.0   ALBUMIN 2.0* 2.1*   BILITOT 0.2 0.5   ALKPHOS 237* 235*   AST 41* 50*   ALT 35 39   ANIONGAP 8 7*     Cardiac Markers:   No results for input(s): "CKMB", "MYOGLOBIN", "BNP", "TROPISTAT" in the last 48 hours.      Magnesium:   Recent Labs   Lab 10/01/24  0408 10/02/24  0335   MG 2.3 2.2     POCT Glucose:   Recent Labs   Lab 10/01/24  1528 10/01/24  2039 10/02/24  0759   POCTGLUCOSE 153* 154* 106     Urine Studies:   No results for input(s): "COLORU", "APPEARANCEUA", "PHUR", "SPECGRAV", "PROTEINUA", "GLUCUA", "KETONESU", "BILIRUBINUA", "OCCULTUA", "NITRITE", "UROBILINOGEN", "LEUKOCYTESUR", "RBCUA", "WBCUA", "BACTERIA", "SQUAMEPITHEL", "HYALINECASTS" in the last 48 hours.    Invalid input(s): "WRIGHTSUR"      Significant Imaging: I have reviewed all pertinent imaging results/findings within the past 24 hours.    Assessment/Plan:      * Wound dehiscence, right ankle  Closed right pilon fracture s/p ORIF on 8/14/2024  72 yo female with history of a fall resulting in a pilon fracture to the right ankle on 8/14/24 s/p ORIF. Sutures removed on 09/12/2024 in Ortho clinic. Presented to hospital for swelling and wound dehiscence to right ankle. No leukocytosis. CRP 9.3 and ESR 49. X-ray of right ankle on admit showed Orthopedic hardware intact with no acute fracture or soft tissue swelling.    - Orthopedics consulted in ED:       - Wound irrigated and soft dressings applied in ED.  - Patient non weight bearing to right lower extremity as per Ortho.   - Multimodal pain regimen. Avoid Tylenol as AST and ALT elevated. Oxycodone IR po prn for breakthrough pain.   - Plan for irrigation and debridement of right ankle wound in OR today (9/25/24) with Dr. Sugalski and Orthopedics with vac placed and full op note still pending from 9/25.  -vanc/rocephin for coverage with serosanginous drainage reported and ortho note reports ID consulted but they were not so consulted 9/27 " given ortho feels concern for infectious given this with hardware exposed. ID changed to dapto/rocephin on 9/28 and appreciate assistance. Rocephin stopped 9/28 per ID  Final ID recs- dapto.rocephin until 11/7 with weekly labs, PICC.   Discussion with jade and MARTHA Dhaliwal and ortho discussed more of findings intra-op with team and given no cultures growth preferred oral if possible and so ID feels okay and will adjust recs and awaiting new recs now 10/1 and rec for doxy BID when discussed  -NWB until at least 11/1 per ortho-   -prevena wound vac for a week- and ortho confirms to keep for  1 week on discharge.  -was on eliquis until oct 26th post pelvic/ankel fx for ppx, okay to resume per ortho and resumed 9/28. Continue with prev end date per dr hansen  Rec for SNF but she declines as out of days and misses her cats and had bad experience. Family aware       Neck muscle spasm  Active on exam 10/1 and new, on robaxin PRN and will trial valium x 1 to see if can break spasm  improved      C. difficile colitis  + after copious diarrhea on 9/28 and oral vanc started, lomotil stpped due to toxic megacolon risk with anti-diarrhea meds  -based on history, was present on admit as the diarrehea started the day she was admitted, and was just at SNF the last month and exposure appears to be from there.  -per ID - cont full dose therapy 4 x a day oral vanc for 10 adys then do BID while on antibiotics until complete 11/7, calculated comes out to 108 pills   She reports diarrhea is seemingly worse 10/2 and at home they didn't plan for this to be an issue with sitters so will need improvement + night sitters set up to be safe at home as cannto wipe or turn herself with the frequency of stools she's stilll having as set up for day sittesr only right now so family working on this per discsusion 10/2  Discussed with her and daughter reasons why can't be on lomotil/immmodium right now until complete full c diff  treatment      Hypokalemia  Patient's most recent potassium results are listed below.   Recent Labs     09/26/24 0447 09/27/24  0400 09/28/24  0313   K 4.2 4.2 3.4*     Plan  - Replete potassium per protocol  - Monitor potassium Daily  - Patient's hypokalemia is stable  -     Diarrhea  Present on admit. Prn immodium not improving diarrhea x 3 doses 9/27, reports at least 7-8 BMs a day right now on 9/28 and causing distress and inabliity to work with therapy.   Will try lomotil 9/28 eleanor and discsused with ID, who recs to pursue c diff testing. Charge and nusring notified and messaging UD for approval based on algorithms for testing  -C diff + on 9/28 PM and stop lomotil and PO vanc started.      Acute blood loss anemia  Anemia is likely due to acute blood loss which was from fx and surgery . Most recent hemoglobin and hematocrit are listed below.  Recent Labs     09/26/24 0447 09/27/24  0400 09/28/24  0313   HGB 7.3* 7.3* 9.5*   HCT 23.5* 24.2* 29.9*       Plan  - Monitor serial CBC: Daily  - Transfuse PRBC if patient becomes hemodynamically unstable, symptomatic or H/H drops below 7/21. And will tx 1 U on 9/27 given hovering at 7.3 now and improved after tx to 9.5  - Patient has not received any PRBC transfusions to date  - Patient's anemia is currently stable      Closed right pilon fracture  - see wound dehiscence above     Transaminitis  - Patient on admit labs with , , and  and normal T. Jc.  - Patient denies abdominal pain or vomiting.  - Patient on Lipitor and Tylenol at home and either medication can effect liver so will hold both meds- had similar issue last admit and improved with holding statin  - Acute hepatitis panel on admit negative.   - Trend daily CMP.  - No signs of liver failure. Do not suspect underlying liver disease and likely medication effect.   -improving ast 60, alt 85 and now 85/69 and watch    Stage 3b chronic kidney disease  Creatine stable and at baseline BMP  reviewed- noted Estimated Creatinine Clearance: 29.8 mL/min (A) (based on SCr of 2 mg/dL (H)). according to latest data. Based on current GFR, CKD stage is stage 3b. Baseline Cr 1.7-2.0. Monitor UOP and serial BMP and adjust therapy as needed. Renally dose meds. Avoid nephrotoxic medications and procedures.      Acute cystitis without hematuria  - U/A on admit with > 100 WBC and many bacteria consistent with UTI. Urine culture sent and patient started on IV Ceftriaxone 1 gram daily to treat + vanc given recent semi resistant ecoli + e faecalis last month   - Follow-up urine culture results.- with ecoli and on rocephin    Type 2 diabetes mellitus with stage 3b chronic kidney disease, with long-term current use of insulin  Patient's FSGs are controlled on current medication regimen. Patient on Lantus insulin 19 units in the evening to treat at home.   Last A1c reviewed-   Lab Results   Component Value Date    HGBA1C 8.2 (H) 07/10/2024     Most recent fingerstick glucose reviewed-   Recent Labs   Lab 09/26/24  1527 09/26/24  2126 09/27/24  0718   POCTGLUCOSE 138* 104 97       Current correctional scale  Low  Maintain anti-hyperglycemic dose as follows-   Antihyperglycemics (From admission, onward)      Start     Stop Route Frequency Ordered    09/27/24 2100  insulin glargine U-100 (Lantus) pen 16 Units         -- SubQ Nightly 09/27/24 0821    09/25/24 0008  insulin aspart U-100 pen 0-5 Units         -- SubQ Before meals & nightly PRN 09/24/24 2308          Hold Oral hypoglycemics while patient is in the hospital.  Target blood sugars 140-180 in hospital.  Monitor blood sugars with meals and at bedtime in hospital.   Diabetic diet post-op.   -watch closely as labile and had more lows than highs last admit, and took ozempic home med when was ath ome briefly which decreases her snacking. Dec levemir on 9/27 as glucose below 100     Acute on chronic diastolic heart failure  Patient is identified as having Diastolic (HFpEF)  heart failure that is Acute on chronic. CHF is currently uncontrolled due to Pulmonary edema/pleural effusion on CXR. CXR on admit with mild pulmonary congestion and BNP elevated at 1373 on admit. Patient with mild excerebration. Latest ECHO performed and demonstrates- Results for orders placed during the hospital encounter of 11/03/23    Echo    Interpretation Summary    Left Ventricle: The left ventricle is normal in size. Increased wall thickness. Moderate septal thickening. Normal wall motion. There is normal systolic function with a visually estimated ejection fraction of 65 - 70%. Grade I diastolic dysfunction.    Right Ventricle: Normal right ventricular cavity size. Systolic function is normal.    Left Atrium: Left atrium is severely dilated.    Aortic Valve: There is moderate stenosis. Aortic valve area by VTI is 1.02 cm². Aortic valve peak velocity is 2.59 m/s. Mean gradient is 18 mmHg. The dimensionless index is 0.32.    Mitral Valve: There is mild regurgitation.    Tricuspid Valve: There is mild regurgitation.    Pulmonary Artery: The estimated pulmonary artery systolic pressure is 35 mmHg.    IVC/SVC: Normal venous pressure at 3 mmHg.    - started on IV Lasix 40 mg BID to treat mild heart failure. Excerebration very mild so okay to proceed with surgery as patient not hypoxic and not having any respiratory distress. Watch given mild cr high limit normal on admit but improving 9/26.  - Continue home Imdur and Toprol XL for chronic heart failure and monitor clinical status closely. Monitor on telemetry.   - Patient is off CHF pathway.    - Monitor strict Is&Os and daily weights.    - Place on fluid restriction of 1.5 L. Cardiology has not been consulted.   - Continue to stress to patient importance of self efficacy and  on diet for CHF.   - Last BNP reviewed- and noted below   Recent Labs   Lab 09/25/24  0248   BNP 1,373*     Still on oxygen 927 with signs of mild overload so keep on IV for now and  off oxygen 9/29 but reports some subjective dyspnea at rest and when laying flat still so continue and reassess tomorrow for oral conversion  Euvolemic appearing 9/30 with inc bicarb now so will go to oral lasix, go to 20 BID as was not on any prevoiusly and was on 40 IV BID now so scale down and plan to cont on dc  Her daughter reports had lasix 40 started at SNF and on discharge when came in overloaded so plan to change now to 40 BID knowing that and keep on this on dischareg    Iron deficiency anemia  Acute blood loss anemia  Anemia is likely due to Iron deficiency and acute blood loss from right ankle. Patient with baseline iron deficiency anemia with baseline Hgb 9-10. Hgb 7.9 on admit and patient reports blood loss from ankle at home when wound dehisced causing worsening anemia. Hgb 7.9 on admit and down to 7.4 on 9/25. Patient typed and crossed and blood consent obtained and 7/3 now, will likely need transfusion in next day or so given lower 7.s watch to ensure no other bleeding signs.  Recent Labs     09/24/24  1622 09/25/24  0248 09/26/24  0447   HGB 7.9* 7.4* 7.3*   HCT 25.9* 24.1* 23.5*       Plan  - Monitor serial CBC: Daily  - Transfuse PRBC if patient becomes hemodynamically unstable, symptomatic or H/H drops below 7/21.  - Patient has not received any PRBC transfusions to date      Coronary artery disease of native artery of native heart with stable angina pectoris  Patient with known CAD s/p stent placement, which is controlled. Monitor for S/Sx of angina/ACS. Continue to monitor on telemetry. Last discharge was placed on brillinta as home cards told her never go off of it and had asa held until oct 26 when goes off eilquis to avoid triple therapy  Had asa started 9/25 post op, will plan to adjust back to brillinta given this over weekedn as hg lower 7's and will do this slowly while titrating back on to regimen was sent home last admit (eliquis until oct 26th and brillinta)  -Had stents placed in  2018 to rca and 2020 to 2 areas per dr cervantes note from [facility], had stress in 2022 that was negative for ischemia. She said he wanted to recheck another one recently but insurance did not cover. She said had aspirin allergy testing before it was restarted due to prev intolerance after a stent   -will go back to brillinta 9/28 as previous plan, and hold asa to avoid triple therapy while on eliquis as above. Will need to find out ortho plan if want to keep on eliquis longer now given longer NWB status with this ankle dehisc, vs going to ASA BID after oct 26 which was initial period of eliquis for co-ppx for ankle and pelvic fx    Mixed hyperlipidemia  Chronic and controlled. Hold home Lipitor for now due elevated LFT's.     Follicular lymphoma  S/p chemo in 2010. In remission.     Primary hypertension  Chronic, controlled. Latest blood pressure and vitals reviewed-     Temp:  [97.5 °F (36.4 °C)-98.8 °F (37.1 °C)]   Pulse:  [62-94]   Resp:  [16-20]   BP: (137-176)/(61-73)   SpO2:  [94 %-99 %] .   Home meds for hypertension were reviewed and noted below.   Hypertension Medications               isosorbide mononitrate (IMDUR) 60 MG 24 hr tablet Take 1 tablet (60 mg total) by mouth once daily.    metoprolol succinate (TOPROL-XL) 25 MG 24 hr tablet Take 1 tablet (25 mg total) by mouth once daily.            While in the hospital, will manage blood pressure as follows; Continue home antihypertensive regimen to treat in hospital.  -BP higher 9/26, had hydralazine added to isorbide/metoprolol last admit so start hydralazine now as BP 180s and she reporst pain minimal  -improving 9/27 but higher again 9/28 so will inc to 100    Will utilize p.r.n. blood pressure medication only if patient's blood pressure greater than 180/110 and she develops symptoms such as worsening chest pain or shortness of breath.      VTE Risk Mitigation (From admission, onward)           Ordered     apixaban tablet 2.5 mg  2 times daily          09/27/24 1238     IP VTE HIGH RISK PATIENT  Once         09/24/24 2231     Reason for No Pharmacological VTE Prophylaxis  Once        Question:  Reasons:  Answer:  Already adequately anticoagulated on oral Anticoagulants    09/24/24 2231                    Discharge Planning   MIKHAIL: 10/3/2024     Code Status: Full Code   Is the patient medically ready for discharge?:     Reason for patient still in hospital (select all that apply): Patient trending condition  Discharge Plan A: Home Health                  Rupal Ochoa MD  Department of Hospital Medicine   [facility]

## 2024-10-02 NOTE — PLAN OF CARE
Pt AAOx4 and VSS . Progressing with plan of care. Free of skin breakdown as the pt positioned/repositioned well independently. Clean, dry, and intact dressing noted on R ankle. Wound vac in placed and suctioning per order. Multiple loose BM overnight. SCDs in place at all times. Incentive spirometer at bedside and pt instructed on its use. No complain of pain at this time. Frequent rounds made to assess pain and safety and no complaints at this time noted. Side rails up x 2. Bed locked. Call light within reach. No falls noted. Will continue to monitor.    Problem: Adult Inpatient Plan of Care  Goal: Plan of Care Review  Outcome: Progressing  Goal: Patient-Specific Goal (Individualized)  Outcome: Progressing  Goal: Absence of Hospital-Acquired Illness or Injury  Outcome: Progressing  Goal: Optimal Comfort and Wellbeing  Outcome: Progressing  Goal: Readiness for Transition of Care  Outcome: Progressing     Problem: Infection  Goal: Absence of Infection Signs and Symptoms  Outcome: Progressing     Problem: Diabetes Comorbidity  Goal: Blood Glucose Level Within Targeted Range  Outcome: Progressing     Problem: Acute Kidney Injury/Impairment  Goal: Fluid and Electrolyte Balance  Outcome: Progressing  Goal: Improved Oral Intake  Outcome: Progressing  Goal: Effective Renal Function  Outcome: Progressing     Problem: Wound  Goal: Optimal Coping  Outcome: Progressing  Goal: Optimal Functional Ability  Outcome: Progressing  Goal: Absence of Infection Signs and Symptoms  Outcome: Progressing  Goal: Improved Oral Intake  Outcome: Progressing  Goal: Optimal Pain Control and Function  Outcome: Progressing  Goal: Skin Health and Integrity  Outcome: Progressing  Goal: Optimal Wound Healing  Outcome: Progressing     Problem: Skin Injury Risk Increased  Goal: Skin Health and Integrity  Outcome: Progressing     Problem: Fall Injury Risk  Goal: Absence of Fall and Fall-Related Injury  Outcome: Progressing

## 2024-10-02 NOTE — SUBJECTIVE & OBJECTIVE
Principal Problem:Wound dehiscence    Principal Orthopedic Problem: as above, s/p I&D right ankle 9/25    Interval History: Patient seen and examined at bedside. VANEON. VSS, afebrile.  Patient worked with PT today. Final ID recs in - pending resolution of diarrhea and C. Diff. plan for DC tomorrow with home health.  Wound VAC switched from hospital vac to Prevena battery vac. we will remove Prevena in 1 week.       Review of patient's allergies indicates:   Allergen Reactions    Novolin 70/30 (semi-synthetic) Nausea And Vomiting     Severe vomiting on Relion 70/30    Sulfa (sulfonamide antibiotics) Anaphylaxis    Talwin [pentazocine lactate] Anaphylaxis    Victoza [liraglutide] Nausea And Vomiting    Glipizide Nausea Only    Citric acid     Codeine     Influenza virus vaccines Hives    Iodine and iodide containing products Hives    Levetiracetam Itching    Neurontin [gabapentin]      Possible associated myoclonic jerk    Rituxan [rituximab] Hives    Zoloft [sertraline] Nausea And Vomiting       Current Facility-Administered Medications   Medication    0.9%  NaCl infusion (for blood administration)    albuterol-ipratropium 2.5 mg-0.5 mg/3 mL nebulizer solution 3 mL    aluminum-magnesium hydroxide-simethicone 200-200-20 mg/5 mL suspension 30 mL    aluminum-magnesium hydroxide-simethicone 200-200-20 mg/5 mL suspension 30 mL    apixaban tablet 2.5 mg    dextrose 10% bolus 125 mL 125 mL    dextrose 10% bolus 250 mL 250 mL    doxycycline tablet 100 mg    FLUoxetine capsule 40 mg    furosemide tablet 40 mg    glucagon (human recombinant) injection 1 mg    glucose chewable tablet 16 g    glucose chewable tablet 24 g    hydrALAZINE tablet 100 mg    hydrocortisone 2.5 % rectal cream    hydrOXYzine HCL tablet 25 mg    insulin aspart U-100 pen 0-5 Units    insulin glargine U-100 (Lantus) pen 16 Units    isosorbide mononitrate 24 hr tablet 60 mg    melatonin tablet 6 mg    methocarbamoL tablet 500 mg    metoprolol succinate  "(TOPROL-XL) 24 hr tablet 25 mg    naloxone 0.4 mg/mL injection 0.02 mg    ondansetron tablet 4 mg    oxyCODONE immediate release tablet 5 mg    polyethylene glycol packet 17 g    prochlorperazine injection Soln 5 mg    QUEtiapine tablet 50 mg    simethicone chewable tablet 80 mg    sodium chloride 0.9% flush 5 mL    sucralfate 100 mg/mL suspension 1 g    ticagrelor tablet 90 mg    vancomycin 125 mg/5 mL oral solution 125 mg     Objective:     Vital Signs (Most Recent):  Temp: 98.3 °F (36.8 °C) (10/02/24 1532)  Pulse: 94 (10/02/24 1532)  Resp: 18 (10/02/24 1634)  BP: (!) 145/67 (10/02/24 1532)  SpO2: 98 % (10/02/24 1532) Vital Signs (24h Range):  Temp:  [97 °F (36.1 °C)-98.6 °F (37 °C)] 98.3 °F (36.8 °C)  Pulse:  [88-96] 94  Resp:  [18-20] 18  SpO2:  [96 %-99 %] 98 %  BP: (141-176)/(63-76) 145/67     Weight: 85.4 kg (188 lb 4.4 oz)  Height: 5' 10" (177.8 cm)  Body mass index is 27.01 kg/m².    No intake or output data in the 24 hours ending 10/02/24 1736       Ortho/SPM Exam  A&O x 3  Regular Rate  Non-Labored Respirations    RLE:  Wound vac with good seal  Fires Quad/TA/EHL/GSC  SILT  Compartments soft  Brisk cap refill         Significant Labs: All pertinent labs within the past 24 hours have been reviewed.    Significant Imaging: I have reviewed all pertinent imaging results/findings.  "

## 2024-10-03 VITALS
WEIGHT: 194.88 LBS | HEIGHT: 70 IN | SYSTOLIC BLOOD PRESSURE: 147 MMHG | BODY MASS INDEX: 27.9 KG/M2 | HEART RATE: 84 BPM | OXYGEN SATURATION: 93 % | DIASTOLIC BLOOD PRESSURE: 69 MMHG | TEMPERATURE: 98 F | RESPIRATION RATE: 17 BRPM

## 2024-10-03 PROBLEM — R74.01 TRANSAMINITIS: Status: RESOLVED | Noted: 2024-08-14 | Resolved: 2024-10-03

## 2024-10-03 PROBLEM — N30.00 ACUTE CYSTITIS WITHOUT HEMATURIA: Status: RESOLVED | Noted: 2023-11-03 | Resolved: 2024-10-03

## 2024-10-03 LAB
ALBUMIN SERPL BCP-MCNC: 1.9 G/DL (ref 3.5–5.2)
ALP SERPL-CCNC: 220 U/L (ref 55–135)
ALT SERPL W/O P-5'-P-CCNC: 36 U/L (ref 10–44)
ANION GAP SERPL CALC-SCNC: 8 MMOL/L (ref 8–16)
AST SERPL-CCNC: 49 U/L (ref 10–40)
BILIRUB SERPL-MCNC: 0.4 MG/DL (ref 0.1–1)
BUN SERPL-MCNC: 28 MG/DL (ref 8–23)
CALCIUM SERPL-MCNC: 8.5 MG/DL (ref 8.7–10.5)
CHLORIDE SERPL-SCNC: 96 MMOL/L (ref 95–110)
CO2 SERPL-SCNC: 31 MMOL/L (ref 23–29)
CREAT SERPL-MCNC: 1.5 MG/DL (ref 0.5–1.4)
ERYTHROCYTE [DISTWIDTH] IN BLOOD BY AUTOMATED COUNT: 14.8 % (ref 11.5–14.5)
EST. GFR  (NO RACE VARIABLE): 36.6 ML/MIN/1.73 M^2
GLUCOSE SERPL-MCNC: 129 MG/DL (ref 70–110)
HCT VFR BLD AUTO: 28.4 % (ref 37–48.5)
HGB BLD-MCNC: 9 G/DL (ref 12–16)
MAGNESIUM SERPL-MCNC: 2.2 MG/DL (ref 1.6–2.6)
MCH RBC QN AUTO: 29.1 PG (ref 27–31)
MCHC RBC AUTO-ENTMCNC: 31.7 G/DL (ref 32–36)
MCV RBC AUTO: 92 FL (ref 82–98)
PHOSPHATE SERPL-MCNC: 2.7 MG/DL (ref 2.7–4.5)
PLATELET # BLD AUTO: 239 K/UL (ref 150–450)
PMV BLD AUTO: 12.3 FL (ref 9.2–12.9)
POCT GLUCOSE: 114 MG/DL (ref 70–110)
POCT GLUCOSE: 148 MG/DL (ref 70–110)
POCT GLUCOSE: 157 MG/DL (ref 70–110)
POCT GLUCOSE: 184 MG/DL (ref 70–110)
POTASSIUM SERPL-SCNC: 3.4 MMOL/L (ref 3.5–5.1)
PROT SERPL-MCNC: 5.8 G/DL (ref 6–8.4)
RBC # BLD AUTO: 3.09 M/UL (ref 4–5.4)
SODIUM SERPL-SCNC: 135 MMOL/L (ref 136–145)
WBC # BLD AUTO: 9.27 K/UL (ref 3.9–12.7)

## 2024-10-03 PROCEDURE — 80053 COMPREHEN METABOLIC PANEL: CPT | Mod: HCNC | Performed by: HOSPITALIST

## 2024-10-03 PROCEDURE — 36415 COLL VENOUS BLD VENIPUNCTURE: CPT | Mod: HCNC | Performed by: HOSPITALIST

## 2024-10-03 PROCEDURE — 25000003 PHARM REV CODE 250: Mod: HCNC

## 2024-10-03 PROCEDURE — 84100 ASSAY OF PHOSPHORUS: CPT | Mod: HCNC | Performed by: HOSPITALIST

## 2024-10-03 PROCEDURE — 85027 COMPLETE CBC AUTOMATED: CPT | Mod: HCNC | Performed by: HOSPITALIST

## 2024-10-03 PROCEDURE — 25000003 PHARM REV CODE 250: Mod: HCNC | Performed by: NURSE PRACTITIONER

## 2024-10-03 PROCEDURE — 97535 SELF CARE MNGMENT TRAINING: CPT | Mod: HCNC

## 2024-10-03 PROCEDURE — 25000003 PHARM REV CODE 250: Mod: HCNC | Performed by: HOSPITALIST

## 2024-10-03 PROCEDURE — 25000003 PHARM REV CODE 250: Mod: HCNC | Performed by: INTERNAL MEDICINE

## 2024-10-03 PROCEDURE — 83735 ASSAY OF MAGNESIUM: CPT | Mod: HCNC | Performed by: HOSPITALIST

## 2024-10-03 RX ADMIN — ISOSORBIDE MONONITRATE 60 MG: 30 TABLET, EXTENDED RELEASE ORAL at 08:10

## 2024-10-03 RX ADMIN — FUROSEMIDE 40 MG: 40 TABLET ORAL at 08:10

## 2024-10-03 RX ADMIN — VANCOMYCIN HYDROCHLORIDE 125 MG: KIT at 11:10

## 2024-10-03 RX ADMIN — ALUMINUM HYDROXIDE, MAGNESIUM HYDROXIDE, AND SIMETHICONE 30 ML: 1200; 120; 1200 SUSPENSION ORAL at 11:10

## 2024-10-03 RX ADMIN — METHOCARBAMOL 500 MG: 500 TABLET ORAL at 08:10

## 2024-10-03 RX ADMIN — FLUOXETINE HYDROCHLORIDE 40 MG: 20 CAPSULE ORAL at 08:10

## 2024-10-03 RX ADMIN — HYDROCORTISONE: 25 CREAM TOPICAL at 08:10

## 2024-10-03 RX ADMIN — SUCRALFATE 1 G: 1 SUSPENSION ORAL at 11:10

## 2024-10-03 RX ADMIN — HYDRALAZINE HYDROCHLORIDE 100 MG: 50 TABLET ORAL at 05:10

## 2024-10-03 RX ADMIN — METHOCARBAMOL 500 MG: 500 TABLET ORAL at 01:10

## 2024-10-03 RX ADMIN — HYDRALAZINE HYDROCHLORIDE 100 MG: 50 TABLET ORAL at 01:10

## 2024-10-03 RX ADMIN — OXYCODONE HYDROCHLORIDE 5 MG: 5 TABLET ORAL at 11:10

## 2024-10-03 RX ADMIN — HYDROXYZINE HYDROCHLORIDE 25 MG: 25 TABLET, FILM COATED ORAL at 11:10

## 2024-10-03 RX ADMIN — TICAGRELOR 90 MG: 90 TABLET ORAL at 08:10

## 2024-10-03 RX ADMIN — APIXABAN 2.5 MG: 2.5 TABLET, FILM COATED ORAL at 08:10

## 2024-10-03 RX ADMIN — DOXYCYCLINE HYCLATE 100 MG: 100 TABLET, COATED ORAL at 08:10

## 2024-10-03 RX ADMIN — SUCRALFATE 1 G: 1 SUSPENSION ORAL at 12:10

## 2024-10-03 RX ADMIN — SUCRALFATE 1 G: 1 SUSPENSION ORAL at 05:10

## 2024-10-03 RX ADMIN — METOPROLOL SUCCINATE 25 MG: 25 TABLET, EXTENDED RELEASE ORAL at 08:10

## 2024-10-03 RX ADMIN — VANCOMYCIN HYDROCHLORIDE 125 MG: KIT at 01:10

## 2024-10-03 RX ADMIN — VANCOMYCIN HYDROCHLORIDE 125 MG: KIT at 12:10

## 2024-10-03 RX ADMIN — ALUMINUM HYDROXIDE, MAGNESIUM HYDROXIDE, AND SIMETHICONE 30 ML: 1200; 120; 1200 SUSPENSION ORAL at 05:10

## 2024-10-03 RX ADMIN — VANCOMYCIN HYDROCHLORIDE 125 MG: KIT at 05:10

## 2024-10-03 NOTE — PLAN OF CARE
10/03/24 1506   Post-Acute Status   Post-Acute Authorization Home Health   Home Health Status Set-up Complete/Auth obtained   Discharge Plan   Discharge Plan A Home Health     SW called [facility] to confirmed received Rw, staff confirmed pt to have vist 10/4.  SW arranged wheelchair transport via [facility] to pt's Home. Requested  time is 4:00 PM. Requested  time does not guarantee arrival time.          Faith Nunes LMSW  Case Management   [facility]   Ext. [phone]

## 2024-10-03 NOTE — PT/OT/SLP PROGRESS
"Occupational Therapy   Treatment    Name: Lorena Contreras  MRN: [id number]  Admitting Diagnosis:  Wound dehiscence  8 Days Post-Op    Recommendations:     Discharge Recommendations: Moderate Intensity Therapy  Discharge Equipment Recommendations:  none  Barriers to discharge:   (increased need for skilled (A))    Assessment:     Lorena Contreras is a 73 y.o. female with a medical diagnosis of Wound dehiscence.  Performance deficits affecting function are weakness, impaired endurance, impaired self care skills, impaired functional mobility, gait instability, impaired balance, decreased lower extremity function, pain, orthopedic precautions. Pt agreeable to therapy and tolerated well. Pt reported increased anxiety/fear of falling and requested bed level therapy at this time. OT provided education and assisted pt on bed level dressing and toileting 2/2 pt wanting to get dressed for discharge and having incontinent urination.  Pt remains limited in ADLs, functional mobility, and functional transfers. Patient continues to demonstrate the need for moderate intensity therapy on a daily basis post acute exhibited by decreased independence with self-care and functional mobility     Rehab Prognosis:  Good; patient would benefit from acute skilled OT services to address these deficits and reach maximum level of function.       Plan:     Patient to be seen 4 x/week to address the above listed problems via self-care/home management, therapeutic activities, therapeutic exercises, neuromuscular re-education  Plan of Care Expires: 10/26/24  Plan of Care Reviewed with: patient    Subjective     Chief Complaint: anxiety  Patient/Family Comments/goals: "My daughters love big dogs. I am a little dog renato girl."   Pain/Comfort:  Pain Rating 1: 0/10  Pain Rating Post-Intervention 1: 0/10    Objective:     Communicated with: Nurse prior to session.  Patient found HOB elevated with wound vac, peripheral IV upon OT entry to " room.    General Precautions: Standard, fall    Orthopedic Precautions:RLE non weight bearing  Braces: N/A  Respiratory Status: Room air     Occupational Performance:     Bed Mobility:    Patient completed Rolling/Turning to Left x3 with  contact guard assistance  Patient completed Rolling/Turning to Right x3  with contact guard assistance     Activities of Daily Living:  Upper Body Dressing: supervision doffed hospital gown and donned dressing sitting with HOB elevated.   Toileting: maximal assistance Pt attempted to perform perineal hygiene with HOB elevated and able to assist ~ 25 %.       [facility] 6 Click ADL: 16    Treatment & Education:  -Education on energy conservation and task modification to maximize safety and (I) during ADLs and mobility  -Education on importance of OOB activity to improve overall activity tolerance and promote recovery  -Pt educated to call for assistance and to transfer with hospital staff only  -Education on NWB RLE precautions. Pt able to recall precautions without verbal cues  -Provided education regarding role of OT, POC, & discharge recommendations with pt and family verbalizing understanding.  Pt had no further questions & when asked whether there were any concerns pt reported none.      Patient left HOB elevated with all lines intact, call button in reach, nurse notified, and family present    GOALS:   Multidisciplinary Problems       Occupational Therapy Goals          Problem: Occupational Therapy    Goal Priority Disciplines Outcome Interventions   Occupational Therapy Goal     OT, PT/OT Progressing    Description: Goals to be met by: 10/26/24     Patient will increase functional independence with ADLs by performing:    LE Dressing with Supervision.  Grooming while standing at sink with Supervision.  T/f with slideboard with CGA  Toileting from toilet with Supervision for hygiene and clothing management.   Supine to sit with Supervision.                         Time Tracking:      OT Date of Treatment: 10/03/24  OT Start Time: 1115  OT Stop Time: 1145  OT Total Time (min): 30 min    Billable Minutes:Self Care/Home Management 30 min    OT/ERIKA: OT          10/3/2024

## 2024-10-03 NOTE — NURSING
Discharge instructions given to patient. Allow time for questions, all questions answered. Prescriptions delivered to bedside. No apparent distress noted. Patient transported via wheelchair.

## 2024-10-06 NOTE — ASSESSMENT & PLAN NOTE
Controlled on discharge. Anemia is likely due to acute blood loss which was from surgery . Most recent hemoglobin and hematocrit are listed below.  Recent Labs     10/03/24  0334   HGB 9.0*   HCT 28.4*       Plan  - Transfuse PRBC if patient becomes hemodynamically unstable, symptomatic or H/H drops below 7/21. And will tx 1 U on 9/27 given hovering at 7.3 now and improved after tx to 9.5  - Patient has not received any PRBC transfusions to date  - Patient's anemia is currently stable on discharge.

## 2024-10-06 NOTE — ASSESSMENT & PLAN NOTE
Patient's FSGs are controlled on current medication regimen on discharge. Patient on Lantus insulin 16 units in the evening to treat at home and to continue on discharge. Patient also on Ozempic at home to treat and continue on discharge.   Last A1c reviewed-   Lab Results   Component Value Date    HGBA1C 8.2 (H) 07/10/2024     Diabetic diet on discharge and continue home monitoring of blood sugars on discharge.

## 2024-10-06 NOTE — ASSESSMENT & PLAN NOTE
Patient having copious diarrhea on 9/28 and oral Vancomycin started and stool sent for C. Diff that returned positive. Patient place don special contact precautions.   - Based on history, was present on admit as the diarrhea started the day she was admitted, and was just at SNF the last month and exposure appears to be from there.  - per ID - continue full dose therapy of Vancomycin 125 mg po 4 times daily for 10 days then do BID while on antibiotics until complete 11/7.   - Patient still having loose stools at time of discharge but slowly improving.   Discussed with patient and daughter reasons why cannot be on Lomotil/Imodium right now until complete full C diff. Treatment. Okay to use probiotics at home to help with re colonization of gut ozzy. Patient and daughter advised to try starchy foods on discharge to help with diarrhea and avoid dairy products and beans that can induce diarrhea.

## 2024-10-06 NOTE — ASSESSMENT & PLAN NOTE
Acute heart failure resolved on discharge and patient well compensated on discharge.   Patient is identified as having Diastolic (HFpEF) heart failure that is Acute on chronic. CHF is currently uncontrolled due to Pulmonary edema/pleural effusion on CXR. CXR on admit with mild pulmonary congestion and BNP elevated at 1373 on admit. Patient with mild excerebration. Latest ECHO performed and demonstrates- Results for orders placed during the hospital encounter of 11/03/23    Echo    Interpretation Summary    Left Ventricle: The left ventricle is normal in size. Increased wall thickness. Moderate septal thickening. Normal wall motion. There is normal systolic function with a visually estimated ejection fraction of 65 - 70%. Grade I diastolic dysfunction.    Right Ventricle: Normal right ventricular cavity size. Systolic function is normal.    Left Atrium: Left atrium is severely dilated.    Aortic Valve: There is moderate stenosis. Aortic valve area by VTI is 1.02 cm². Aortic valve peak velocity is 2.59 m/s. Mean gradient is 18 mmHg. The dimensionless index is 0.32.    Mitral Valve: There is mild regurgitation.    Tricuspid Valve: There is mild regurgitation.    Pulmonary Artery: The estimated pulmonary artery systolic pressure is 35 mmHg.    IVC/SVC: Normal venous pressure at 3 mmHg.    - Patient started on IV Lasix 40 mg BID to treat mild heart failure. Excerebration very mild so okay to proceed with surgery as patient not hypoxic and not having any respiratory distress. Watch given mild cr high limit normal on admit but improving 9/26.  - Continue home Imdur and Toprol XL for chronic heart failure and monitor clinical status closely and continue on discharge.   - Patient is off CHF pathway.    - Monitor strict Is&Os and daily weights.    - Place on fluid restriction of 1.5 L. Cardiology has not been consulted.   - Continue to stress to patient importance of self efficacy and  on diet for CHF.     Euvolemic  appearing 9/30 with increasing bicarbonate now so switched to Lasix 40 mg po BID as was not on any previously and was on 40 IV BID now so scale down and plan to continue po Lasix on discharge.

## 2024-10-06 NOTE — ASSESSMENT & PLAN NOTE
Chronic, controlled. Latest blood pressure and vitals reviewed-      .   Home meds for hypertension were reviewed and noted below.   Hypertension Medications               isosorbide mononitrate (IMDUR) 60 MG 24 hr tablet Take 1 tablet (60 mg total) by mouth once daily.    metoprolol succinate (TOPROL-XL) 25 MG 24 hr tablet Take 1 tablet (25 mg total) by mouth once daily.            Continue home antihypertensive regimen to treat on discharge.

## 2024-10-06 NOTE — DISCHARGE SUMMARY
"[facility]  Discharge Summary      Patient Name: Lorena Contreras  MRN: [id number]  [facility]: [id number]  Patient Class: IP- Inpatient  Admission Date: 9/24/2024  Hospital Length of Stay: 8 days  Discharge Date and Time: 10/3/2024  5:52 PM  Attending Physician: Daniela Abernathy MD   Discharging Provider: Daniela Abernathy MD  Primary Care Provider: Jorge Paris MD  [facility] Medicine Team: [facility] HOSP MED  Daniela Abernathy MD  Primary Care Team: [facility]    HPI:   Lorena Contreras is a 74 yo F with PMHx of  DM2, Fibromyalgia, lymphoma s/p chemo, HTN, HLD, CVA, MI, CAD s/p PCIs, CKD3b, HFpEF, and anemia who presented to ED for R ankle wound dehiscence. The patient reports that on 8/14/24, she was walking into a [employer] [place] when she lost her balance, fell, and twisted her ankle. She sustained a pelvic fracture and a right pilon fracture, requiring surgery at [facility] later that day. Following the fall, she experienced urinary retention and had an indwelling catheter placed. While at the SNF, the patient reports significant progress, noting that her injuries were healing well, and she regained some mobility. Her wound was evaluated, and sutures were removed on 9/12/24. The patient was discharged home four days ago but describes her experience as "miserable," citing a lack of mobility aids provided for home use. At SNF, she had not been bearing weight on her RLE. She reports while attempting to stand on her RLE with home health her injury opened up. Since then, she has had increased swelling to her R ankle and associated generalized weakness and fatigue, decreased appetite, constipation, SOB (when anxious) and nausea. She denies fevers, chills, rhinorrhea, sore throat, cough, hemoptysis, chest pain, vomiting, diarrhea, or blood in her stool.     ED Course: AFVSS. CBC significant hgb 7.9 (down from 11.8 on month ago). No leukocytosis. CMP with Cr 2.0 (at baseline), transaminitis ALP " 476, , and . BG initially 232, but now 91. CRP 93 and ESR 49. Lactate 0.91. R ankle XR with stable hardware and no acute fx or dislocation, but concerning for cellulitis. Ortho consulted and planning for I&D in AM. Given IV zofran. Placed in observation with HM.       9/25/2024  Procedure(s) (LRB):  IRRIGATION AND DEBRIDEMENT, LOWER EXTREMITY; slider table, supine, bone foam, cysto tubing, 6L NS/dakins/peroxide, culture swabs (Right)  APPLICATION, WOUND VAC (Right)  DEBRIDEMENT, LOCAL FLAP (Right)    Surgeon(s):  Manas Parks MD Sugalski, Christopher B., MD      Hospital Course:   Orthopedics consulted and patient taken to OR on 9/25/2024 and underwent excisional debridement of left ankle medial wound including skin, subcutaneous tissue, fascia, bone and fasciocutaneous flap right medial ankle. Bone and tissue biopsies taken. No purulence noted. Surgical wound cultures sent. Prevena wound vac placed in OR to right ankle incision site. Patient placed on empiric IV Daptomycin and Ceftriaxone pending culture results. On admission patient was having a lot of watery type diarrhea and placed on Imodium and Lomotil but diarrhea continued so C. Difficile sent and returned back positive and patient placed on oral Vancomycin to treat C. Diff infection and place don special contact precautions. Infectious disease consulted about right ankle dehiscence and C. Diff. At first plan was for long term IV antibiotics on discharge but after discussion with Orthopedics plan changed and new plan was oral antibiotics on discharge. Patient placed on po Doxycyline 100 mg po BID on 10/2 and Daptomycin and Ceftriaxone discontinued by ID on 10/2. PT/OT consulted post-op. Therapy recommending moderate intensity. Discussion with patient and family made as patient out of SNF days and after discussion plan is  PT/OT on discharge back home with family and sitters. Per Ortho, patient to remain non weight bearing to right lower  extremity at least until 11/1/2024. Plan is Prevena wound vac on discharge for 1 week and then return to Ortho clinic for wound check and vac removal as outpatient. Patient discharged with Prevena wound vac in place to right ankle incision. Patient placed on Apixiban after surgery for DVT prophylaxis and to continue until 10/26 as per Ortho on discharge. Patient still having watery diarrhea on 10/2. Patient still having loose stools on 10/3 but slowly improving on oral Vancomycin to treat her C. Diff. Patient doing well from ankle prospective and discharged home with family on 10/3 in good condition with  PT/OT and nursing. Patient to continue po Doxycyline for her right ankle and oral Vancomycin to treat her C. Diff on discharge. Recommended probiotics on discharge to help with her C. Diff and re colonization of her gut ozzy. Pain controlled to right ankle on discharge. Wound cultures from right ankle at time of discharge were no growth. Patient to follow-up with Orthopedics and Infectious Disease on discharge.       Goals of Care Treatment Preferences:  Code Status: Full Code      SDOH Screening:  The patient was screened for utility difficulties, food insecurity, transport difficulties, housing insecurity, and interpersonal safety and there were no concerns identified this admission.     Consults:   Consults (From admission, onward)          Status Ordering Provider     Inpatient consult to Infectious Diseases  Once        Provider:  (Not yet assigned)    Completed JAN ACOSTA     Inpatient consult to Orthopedic Surgery  Once        Provider:  (Not yet assigned)    Completed KARRI MESSINA            Cardiac/Vascular  Mixed hyperlipidemia  Chronic and controlled. Resume home Lipitor on discharge to treat.     Primary hypertension  Chronic, controlled. Latest blood pressure and vitals reviewed-      .   Home meds for hypertension were reviewed and noted below.   Hypertension Medications                isosorbide mononitrate (IMDUR) 60 MG 24 hr tablet Take 1 tablet (60 mg total) by mouth once daily.    metoprolol succinate (TOPROL-XL) 25 MG 24 hr tablet Take 1 tablet (25 mg total) by mouth once daily.            Continue home antihypertensive regimen to treat on discharge.     Coronary artery disease of native artery of native heart with stable angina pectoris  Patient with known CAD s/p stent placement, which is controlled. Monitor for S/Sx of angina/ACS. Continue to monitor on telemetry. Last discharge was placed on Brilinta as home Cards told her never go off of it and had Aspirin held until 10/26 when goes off Apixiban to avoid triple therapy.  - Had stents placed in 2018 to RCA and 2020 to 2 areas per Dr. Pagan note from Cardiology, had stress in 2022 that was negative for ischemia. She said he wanted to recheck another one recently but insurance did not cover. She said had Aspirin allergy testing before it was restarted due to previous intolerance after a stent.  - Patient back on Brilinta 9/28 as previous plan, and hold Aspirin to avoid triple therapy while on Apixiban as above.    Acute on chronic diastolic heart failure-resolved as of 10/7/2024  Acute heart failure resolved on discharge and patient well compensated on discharge.   Patient is identified as having Diastolic (HFpEF) heart failure that is Acute on chronic. CHF is currently uncontrolled due to Pulmonary edema/pleural effusion on CXR. CXR on admit with mild pulmonary congestion and BNP elevated at 1373 on admit. Patient with mild excerebration. Latest ECHO performed and demonstrates- Results for orders placed during the hospital encounter of 11/03/23    Echo    Interpretation Summary    Left Ventricle: The left ventricle is normal in size. Increased wall thickness. Moderate septal thickening. Normal wall motion. There is normal systolic function with a visually estimated ejection fraction of 65 - 70%. Grade I diastolic dysfunction.    Right  Ventricle: Normal right ventricular cavity size. Systolic function is normal.    Left Atrium: Left atrium is severely dilated.    Aortic Valve: There is moderate stenosis. Aortic valve area by VTI is 1.02 cm². Aortic valve peak velocity is 2.59 m/s. Mean gradient is 18 mmHg. The dimensionless index is 0.32.    Mitral Valve: There is mild regurgitation.    Tricuspid Valve: There is mild regurgitation.    Pulmonary Artery: The estimated pulmonary artery systolic pressure is 35 mmHg.    IVC/SVC: Normal venous pressure at 3 mmHg.    - Patient started on IV Lasix 40 mg BID to treat mild heart failure. Excerebration very mild so okay to proceed with surgery as patient not hypoxic and not having any respiratory distress. Watch given mild cr high limit normal on admit but improving 9/26.  - Continue home Imdur and Toprol XL for chronic heart failure and monitor clinical status closely and continue on discharge.   - Patient is off CHF pathway.    - Monitor strict Is&Os and daily weights.    - Place on fluid restriction of 1.5 L. Cardiology has not been consulted.   - Continue to stress to patient importance of self efficacy and  on diet for CHF.     Euvolemic appearing 9/30 with increasing bicarbonate now so switched to Lasix 40 mg po BID as was not on any previously and was on 40 IV BID now so scale down and plan to continue po Lasix on discharge.       Renal/  Hypokalemia  Resolved on discharge.   Plan  - Replete potassium per protocol  - Patient's hypokalemia is resolved on discharge.       Stage 3b chronic kidney disease  Creatine stable and at baseline BMP reviewed- noted Estimated Creatinine Clearance: 40.3 mL/min (A) (based on SCr of 1.5 mg/dL (H)). according to latest data. Based on current GFR, CKD stage is stage 3b. Baseline Cr 1.7-2.0. Monitor UOP and serial BMP and adjust therapy as needed. Renally dose meds. Avoid nephrotoxic medications and procedures. Creatinine at baseline on discharge and stable.        E. coli UTI-resolved as of 10/3/2024  U/A on admit with > 100 WBC and many bacteria consistent with UTI. Urine culture sent and patient started on IV Ceftriaxone 1 gram daily to treat. Final urine culture grew > 100,000 E. Coli sensitive to Ceftriaxone so continue treatment with Ceftriaxone and completed 3 day course in hospital. Resolved.       ID  C. difficile colitis  Patient having copious diarrhea on 9/28 and oral Vancomycin started and stool sent for C. Diff that returned positive. Patient place don special contact precautions.   - Based on history, was present on admit as the diarrhea started the day she was admitted, and was just at SNF the last month and exposure appears to be from there.  - per ID - continue full dose therapy of Vancomycin 125 mg po 4 times daily for 10 days then do BID while on antibiotics until complete 11/7.   - Patient still having loose stools at time of discharge but slowly improving.   Discussed with patient and daughter reasons why cannot be on Lomotil/Imodium right now until complete full C diff. Treatment. Okay to use probiotics at home to help with re colonization of gut ozzy. Patient and daughter advised to try starchy foods on discharge to help with diarrhea and avoid dairy products and beans that can induce diarrhea.       Oncology  Iron deficiency anemia  Acute blood loss anemia  Anemia is likely due to Iron deficiency and acute blood loss from right ankle. Patient with baseline iron deficiency anemia with baseline Hgb 9-10. Hgb 7.9 on admit and patient reports blood loss from ankle at home when wound dehisced causing worsening anemia. Hgb 7.9 on admit and down to 7.4 on 9/25. Patient typed and crossed and blood consent obtained and 7/3 now.  Recent Labs     10/03/24  0334   HGB 9.0*   HCT 28.4*       Plan  - Transfuse PRBC if patient becomes hemodynamically unstable, symptomatic or H/H drops below 7/21.  - Patient has not received any PRBC transfusions to  date      Follicular lymphoma  S/p chemo in 2010. In remission.     Acute blood loss anemia  Controlled on discharge. Anemia is likely due to acute blood loss which was from surgery . Most recent hemoglobin and hematocrit are listed below.  Recent Labs     10/03/24  0334   HGB 9.0*   HCT 28.4*       Plan  - Transfuse PRBC if patient becomes hemodynamically unstable, symptomatic or H/H drops below 7/21. And will tx 1 U on 9/27 given hovering at 7.3 now and improved after tx to 9.5  - Patient has not received any PRBC transfusions to date  - Patient's anemia is currently stable on discharge.       Endocrine  Type 2 diabetes mellitus with stage 3b chronic kidney disease, with long-term current use of insulin  Patient's FSGs are controlled on current medication regimen on discharge. Patient on Lantus insulin 16 units in the evening to treat at home and to continue on discharge. Patient also on Ozempic at home to treat and continue on discharge.   Last A1c reviewed-   Lab Results   Component Value Date    HGBA1C 8.2 (H) 07/10/2024     Diabetic diet on discharge and continue home monitoring of blood sugars on discharge.     GI  Transaminitis-resolved as of 10/3/2024  Resolved on discharge.   - Patient on admit labs with , , and  and normal T. Jc.  - Patient denies abdominal pain or vomiting.  - Patient on Lipitor and Tylenol at home and either medication can effect liver so will hold both meds- had similar issue last admit and improved with holding statin  - Acute hepatitis panel on admit negative.   - No signs of liver failure. Do not suspect underlying liver disease and likely medication effect.       Orthopedic  * Wound dehiscence, right ankle  Closed right pilon fracture s/p ORIF on 8/14/2024  74 yo female with history of a fall resulting in a pilon fracture to the right ankle on 8/14/24 s/p ORIF. Sutures removed on 09/12/2024 in Ortho clinic. Presented to hospital for swelling and wound  dehiscence to right ankle. No leukocytosis. CRP 9.3 and ESR 49. X-ray of right ankle on admit showed Orthopedic hardware intact with no acute fracture or soft tissue swelling.    - Orthopedics consulted in ED:       - Wound irrigated and soft dressings applied in ED.  - Patient non weight bearing to right lower extremity as per Ortho.   - Multimodal pain regimen. Oxycodone IR po prn for breakthrough pain.   - Patient taken to OR with DR. Neto Davalos  and had fasciocutaneous flap right medial ankle and excisional debridement of left ankle medial wound including skin, subcutaneous tissue, fascia, bone and placement of Prevena wound vac to incision. No terrance pus noted and tissue sent for culture.   - Patient placed on empiric Vancomycin and Ceftriaxone for wound coverage with serosanginous drainage reported. ID consulted and changed abx to Daptomycin/Ceftraixone on 9/28.   - Discussion with Dr. Davalos and ID Dr Dhaliwal done as initial recs were for home IV abx but after ID discussed with Ortho more of findings intra-op with team and given no cultures growth preferred oral if possible and so ID feels okay and will adjust recs and recommended oral Doxycyline on discharge. Patient discharged home on oral Doxycyline.   - Patient to remain non weight bearing to right lower extremity until at least 11/1 per Ortho and continue on discharge.    - Patient was on Apixiban until 10/26 post pelvic/ankle fracture surgeries for DVT prophylaxis, okay to resume per Ortho post-op so resumed on 9/28. Continue with previous end date per Dr. Davalos of 10/26 and continue on discharge.   - PT/OT consulted post-op and recommended moderate intensity therapy but patient and family declined as patient out of SNF days and misses her cats and had bad experience. Family aware and agreeable with providing care for patient at home and plan on hiring sitters to assist with her care at home. Patient discharged home with  PT/OT and  nursing.   - Per Ortho:   X-rays at subsequent followups starting at 3 weeks postop:  Right ankle  Right pelvis     Follow-up postop  1 week - remove incisional wound VAC  2 weeks - wound check  3 weeks - suture removal  6 weeks, 3 months, 6 months, 1 year     Per Ortho, if this wound breaks down patient will likely need another I and D, at that time we would consider removal of the medial plate and Plastic surgery consultation for soft tissue coverage.    Neck muscle spasm  Active on exam 10/1 and new, on Robaxin PRN and will trial Valium x 1 to see if can break spasm  Valium helped with neck spasm and much improved on discharge.         Final Active Diagnoses:    Diagnosis Date Noted POA    PRINCIPAL PROBLEM:  Wound dehiscence, right ankle [T81.30XA] 09/24/2024 Yes    C. difficile colitis [A04.72] 09/29/2024 Yes    Type 2 diabetes mellitus with stage 3b chronic kidney disease, with long-term current use of insulin [E11.22, N18.32, Z79.4] 08/24/2023 Not Applicable    Acute blood loss anemia [D62] 08/14/2024 Yes    Stage 3b chronic kidney disease [N18.32] 05/28/2024 Yes    Acute on chronic diastolic heart failure [I50.33] 08/10/2021 Yes    Coronary artery disease of native artery of native heart with stable angina pectoris [I25.118] 03/29/2019 Yes    Primary hypertension [I10] 01/24/2014 Yes    Mixed hyperlipidemia [E78.2] 07/30/2015 Yes    Follicular lymphoma [C82.90] 08/26/2014 Yes    Iron deficiency anemia [D50.9] 06/01/2021 Yes    Hypokalemia [E87.6] 09/28/2024 No    Neck muscle spasm [M62.838] 10/01/2024 No    Closed right pilon fracture [S82.871A] 08/14/2024 Yes      Problems Resolved During this Admission:    Diagnosis Date Noted Date Resolved POA    Acute cystitis without hematuria [N30.00] 11/03/2023 10/03/2024 Yes    Transaminitis [R74.01] 08/14/2024 10/03/2024 Yes    Diarrhea [R19.7] 09/27/2024 10/02/2024 Yes       Discharged Condition: good    Disposition: Home-Health Care [facility]    Follow Up:    Future  Appointments   Date Time Provider Department Center   10/9/2024  8:45 AM Raheem Bucio NP [facility] ORTHO [address]   10/24/2024  9:40 AM Jorge Paris MD [facility] MED [place]   11/5/2024 10:00 AM William Dhaliwal MD [facility] ID [address]   11/6/2024 11:00 AM LAB, [facility] LAB [place]   11/13/2024  3:00 PM Gerardo Barbour MD [facility]       Patient Instructions:      Diet diabetic     Notify your health care provider if you experience any of the following:  temperature >100.4     Notify your health care provider if you experience any of the following:  persistent nausea and vomiting or diarrhea     Notify your health care provider if you experience any of the following:  severe uncontrolled pain     Notify your health care provider if you experience any of the following:  redness, tenderness, or signs of infection (pain, swelling, redness, odor or green/yellow discharge around incision site)     Notify your health care provider if you experience any of the following:  difficulty breathing or increased cough     Notify your health care provider if you experience any of the following:  severe persistent headache     Notify your health care provider if you experience any of the following:  worsening rash     Notify your health care provider if you experience any of the following:  persistent dizziness, light-headedness, or visual disturbances     Notify your health care provider if you experience any of the following:  increased confusion or weakness     No dressing needed   Order Comments: Keep Prevena wound vac in place until Orthopedic clinic follow-up and do not remove.     Weight bearing restrictions (specify):   Order Comments: Non weight bearing to right lower extremity until 11/1/2024       Significant Diagnostic Studies:   Recent Labs     10/03/24  0334   *   *   K 3.4*   CL 96   CO2 31*   BUN 28*   CREATININE 1.5*   ANIONGAP 8   BILITOT 0.4   AST 49*   ALT 36   ALKPHOS  220*   ALBUMIN 1.9*   PROT 5.8*   MG 2.2   PHOS 2.7      Hemoglobin   Date Value Ref Range Status   10/03/2024 9.0 (L) 12.0 - 16.0 g/dL Final   10/02/2024 9.1 (L) 12.0 - 16.0 g/dL Final   10/01/2024 8.7 (L) 12.0 - 16.0 g/dL Final   09/30/2024 8.7 (L) 12.0 - 16.0 g/dL Final   09/29/2024 10.2 (L) 12.0 - 16.0 g/dL Final     POC Hematocrit   Date Value Ref Range Status   08/16/2024 24 (L) 36 - 54 %PCV Final   08/16/2024 23 (L) 36 - 54 %PCV Final   08/16/2024 25 (L) 36 - 54 %PCV Final   08/16/2024 22 (L) 36 - 54 %PCV Final     Hematocrit   Date Value Ref Range Status   10/03/2024 28.4 (L) 37.0 - 48.5 % Final   10/02/2024 29.7 (L) 37.0 - 48.5 % Final   10/01/2024 27.7 (L) 37.0 - 48.5 % Final   09/30/2024 28.5 (L) 37.0 - 48.5 % Final   09/29/2024 32.7 (L) 37.0 - 48.5 % Final     C. diff Antigen   Date Value Ref Range Status   09/28/2024 Positive (A) Negative Final     C difficile Toxins A+B, EIA   Date Value Ref Range Status   09/28/2024 Positive (A) Negative Final     Comment:     Testing not recommended for children <24 months old.     Aerobic Bacterial Culture   Date Value Ref Range Status   09/25/2024 No growth  Final   09/25/2024 No growth  Final   09/25/2024 No growth  Final     Anaerobic Culture   Date Value Ref Range Status   09/25/2024 No anaerobes isolated  Final   09/25/2024 No anaerobes isolated  Final   09/25/2024 No anaerobes isolated  Final     Urine Culture, Routine   Date Value Ref Range Status   09/25/2024 (A)  Final    ESCHERICHIA COLI  > 100,000 cfu/ml  No other significant isolate         Pending Diagnostic Studies:       None           Medications:  Reconciled Home Medications:      Medication List        START taking these medications      doxycycline 100 MG tablet  Commonly known as: VIBRA-TABS  Take 1 tablet (100 mg total) by mouth every 12 (twelve) hours. End date 11/7/24     furosemide 40 MG tablet  Commonly known as: LASIX  Take 1 tablet (40 mg total) by mouth 2 (two) times daily.      hydrALAZINE 100 MG tablet  Commonly known as: APRESOLINE  Take 1 tablet (100 mg total) by mouth every 8 (eight) hours.     vancomycin 125 MG capsule  Commonly known as: VANCOCIN  Take 1 capsule by mouth every 6 hours until October 9th then take twice daily until off IV antibiotics (11/7/24) then stop            CHANGE how you take these medications      insulin glargine U-100 (Lantus) 100 unit/mL (3 mL) Inpn pen  Inject 16 Units into the skin every evening.  What changed: how much to take     oxyCODONE 5 MG immediate release tablet  Commonly known as: ROXICODONE  Take 1 tablet (5 mg total) by mouth every 6 (six) hours as needed (pain scale 4-6).  What changed: Another medication with the same name was removed. Continue taking this medication, and follow the directions you see here.     OZEMPIC 1 mg/dose (4 mg/3 mL)  Generic drug: semaglutide  Inject 1 mg into the skin every 7 days. HOLD while at [facility]  What changed: Another medication with the same name was removed. Continue taking this medication, and follow the directions you see here.     QUEtiapine 50 MG tablet  Commonly known as: SEROQUEL  Take 1 tablet (50 mg total) by mouth nightly as needed (insomnia).  What changed:   when to take this  reasons to take this            CONTINUE taking these medications      albuterol 90 mcg/actuation inhaler  Commonly known as: PROVENTIL/VENTOLIN HFA  Inhale 2 puffs into the lungs every 6 (six) hours as needed for Wheezing or Shortness of Breath.     aluminum & magnesium hydroxide-simethicone 400-400-40 mg/5 mL suspension  Commonly known as: MYLANTA MAX STRENGTH  Take 30 mLs by mouth every 6 (six) hours as needed for Indigestion.     aspirin 81 MG Chew  Take 1 tablet (81 mg total) by mouth once daily. Hold to avoid bleed risk on triple therapy and then resume on oct 27 once eliquis stops on oct 26th     DEXCOM G7  Misc  Generic drug: blood-glucose meter,continuous  Use 1  to track blood glucose, ICD10:  "E11.65     DEXCOM G7 SENSOR Samina  Generic drug: blood-glucose sensor  Use 1 sensor every 10 days to track blood glucose, ICD10: E11.65, okay with 90 day supply if possible     ELIQUIS 2.5 mg Tab  Generic drug: apixaban  Take 1 tablet (2.5 mg total) by mouth 2 (two) times daily. End date October 26th 2024 for 24 days     FLUoxetine 40 MG capsule  Take 1 capsule (40 mg total) by mouth once daily.     hydrOXYzine HCL 25 MG tablet  Commonly known as: ATARAX  Take 1 tablet (25 mg total) by mouth 3 (three) times daily as needed for Itching.     insulin aspart U-100 100 unit/mL (3 mL) Inpn pen  Commonly known as: NovoLOG  Inject 0-10 Units into the skin before meals and at bedtime as needed (Hyperglycemia).     isosorbide mononitrate 60 MG 24 hr tablet  Commonly known as: IMDUR  Take 1 tablet (60 mg total) by mouth once daily.     methocarbamoL 500 MG Tab  Commonly known as: ROBAXIN  Take 1 tablet (500 mg total) by mouth 4 (four) times daily.     metoprolol succinate 25 MG 24 hr tablet  Commonly known as: TOPROL-XL  Take 1 tablet (25 mg total) by mouth once daily.     ondansetron 8 MG Tbdl  Commonly known as: ZOFRAN-ODT  Dissolve 1 tablet (8 mg total) by mouth every 8 (eight) hours as needed (nausea).     pen needle, diabetic 32 gauge x 5/32" Ndle  Commonly known as: BD ULTRA-FINE SAGAR PEN NEEDLE  One pen needle use with insulin pen 4 times a day.  ICD-10: E11.9     ticagrelor 90 mg tablet  Commonly known as: BRILINTA  Take 1 tablet (90 mg total) by mouth 2 (two) times daily.            STOP taking these medications      acetaminophen 500 MG tablet  Commonly known as: TYLENOL     atorvastatin 80 MG tablet  Commonly known as: LIPITOR     CALCIUM ANTACID 300 mg (750 mg) chewable tablet  Generic drug: calcium carbonate     divalproex 125 mg capsule  Commonly known as: DEPAKOTE SPRINKLES     melatonin 3 mg tablet  Commonly known as: MELATIN     pantoprazole 40 MG tablet  Commonly known as: PROTONIX     polyethylene glycol 17 " gram Pwpk  Commonly known as: GLYCOLAX              Indwelling Lines/Drains at time of discharge:   Lines/Drains/Airways       None                   34 minutes of time spent on discharge, including examining the patient, providing discharge instructions, arranging follow-up and documentation.            Daniela Abernathy MD  Department of Hospital Medicine  [facility] - Surgery

## 2024-10-06 NOTE — ASSESSMENT & PLAN NOTE
Acute blood loss anemia  Anemia is likely due to Iron deficiency and acute blood loss from right ankle. Patient with baseline iron deficiency anemia with baseline Hgb 9-10. Hgb 7.9 on admit and patient reports blood loss from ankle at home when wound dehisced causing worsening anemia. Hgb 7.9 on admit and down to 7.4 on 9/25. Patient typed and crossed and blood consent obtained and 7/3 now.  Recent Labs     10/03/24  0334   HGB 9.0*   HCT 28.4*       Plan  - Transfuse PRBC if patient becomes hemodynamically unstable, symptomatic or H/H drops below 7/21.  - Patient has not received any PRBC transfusions to date

## 2024-10-06 NOTE — ASSESSMENT & PLAN NOTE
Resolved on discharge.   - Patient on admit labs with , , and  and normal T. Jc.  - Patient denies abdominal pain or vomiting.  - Patient on Lipitor and Tylenol at home and either medication can effect liver so will hold both meds- had similar issue last admit and improved with holding statin  - Acute hepatitis panel on admit negative.   - No signs of liver failure. Do not suspect underlying liver disease and likely medication effect.

## 2024-10-06 NOTE — ASSESSMENT & PLAN NOTE
Patient with known CAD s/p stent placement, which is controlled. Monitor for S/Sx of angina/ACS. Continue to monitor on telemetry. Last discharge was placed on Brilinta as home Cards told her never go off of it and had Aspirin held until 10/26 when goes off Apixiban to avoid triple therapy.  - Had stents placed in 2018 to RCA and 2020 to 2 areas per Dr. Pagan note from Cardiology, had stress in 2022 that was negative for ischemia. She said he wanted to recheck another one recently but insurance did not cover. She said had Aspirin allergy testing before it was restarted due to previous intolerance after a stent.  - Patient back on Brilinta 9/28 as previous plan, and hold Aspirin to avoid triple therapy while on Apixiban as above.

## 2024-10-06 NOTE — ASSESSMENT & PLAN NOTE
Resolved on discharge.   Plan  - Replete potassium per protocol  - Patient's hypokalemia is resolved on discharge.

## 2024-10-06 NOTE — ASSESSMENT & PLAN NOTE
U/A on admit with > 100 WBC and many bacteria consistent with UTI. Urine culture sent and patient started on IV Ceftriaxone 1 gram daily to treat. Final urine culture grew > 100,000 E. Coli sensitive to Ceftriaxone so continue treatment with Ceftriaxone and completed 3 day course in hospital. Resolved.

## 2024-10-06 NOTE — ASSESSMENT & PLAN NOTE
Creatine stable and at baseline BMP reviewed- noted Estimated Creatinine Clearance: 40.3 mL/min (A) (based on SCr of 1.5 mg/dL (H)). according to latest data. Based on current GFR, CKD stage is stage 3b. Baseline Cr 1.7-2.0. Monitor UOP and serial BMP and adjust therapy as needed. Renally dose meds. Avoid nephrotoxic medications and procedures. Creatinine at baseline on discharge and stable.

## 2024-10-06 NOTE — ASSESSMENT & PLAN NOTE
Closed right pilon fracture s/p ORIF on 8/14/2024  72 yo female with history of a fall resulting in a pilon fracture to the right ankle on 8/14/24 s/p ORIF. Sutures removed on 09/12/2024 in Ortho clinic. Presented to hospital for swelling and wound dehiscence to right ankle. No leukocytosis. CRP 9.3 and ESR 49. X-ray of right ankle on admit showed Orthopedic hardware intact with no acute fracture or soft tissue swelling.    - Orthopedics consulted in ED:       - Wound irrigated and soft dressings applied in ED.  - Patient non weight bearing to right lower extremity as per Ortho.   - Multimodal pain regimen. Oxycodone IR po prn for breakthrough pain.   - Patient taken to OR with DR. Neto Davalos  and had fasciocutaneous flap right medial ankle and excisional debridement of left ankle medial wound including skin, subcutaneous tissue, fascia, bone and placement of Prevena wound vac to incision. No terrance pus noted and tissue sent for culture.   - Patient placed on empiric Vancomycin and Ceftriaxone for wound coverage with serosanginous drainage reported. ID consulted and changed abx to Daptomycin/Ceftraixone on 9/28.   - Discussion with Dr. Davalos and ID Dr Dhaliwal done as initial recs were for home IV abx but after ID discussed with Ortho more of findings intra-op with team and given no cultures growth preferred oral if possible and so ID feels okay and will adjust recs and recommended oral Doxycyline on discharge. Patient discharged home on oral Doxycyline.   - Patient to remain non weight bearing to right lower extremity until at least 11/1 per Ortho and continue on discharge.    - Patient was on Apixiban until 10/26 post pelvic/ankle fracture surgeries for DVT prophylaxis, okay to resume per Ortho post-op so resumed on 9/28. Continue with previous end date per Dr. Davalos of 10/26 and continue on discharge.   - PT/OT consulted post-op and recommended moderate intensity therapy but patient and family  declined as patient out of SNF days and misses her cats and had bad experience. Family aware and agreeable with providing care for patient at home and plan on hiring sitters to assist with her care at home. Patient discharged home with  PT/OT and nursing.   - Per Ortho:   X-rays at subsequent followups starting at 3 weeks postop:  Right ankle  Right pelvis     Follow-up postop  1 week - remove incisional wound VAC  2 weeks - wound check  3 weeks - suture removal  6 weeks, 3 months, 6 months, 1 year     Per Ortho, if this wound breaks down patient will likely need another I and D, at that time we would consider removal of the medial plate and Plastic surgery consultation for soft tissue coverage.

## 2024-10-06 NOTE — ASSESSMENT & PLAN NOTE
Active on exam 10/1 and new, on Robaxin PRN and will trial Valium x 1 to see if can break spasm  Valium helped with neck spasm and much improved on discharge.

## 2024-10-07 ENCOUNTER — PATIENT OUTREACH (OUTPATIENT)
Dept: ADMINISTRATIVE | Facility: CLINIC | Age: 74
End: 2024-10-07
Payer: MEDICARE

## 2024-10-07 PROBLEM — B96.20 E. COLI UTI: Status: RESOLVED | Noted: 2023-11-03 | Resolved: 2024-10-03

## 2024-10-07 PROBLEM — N39.0 E. COLI UTI: Status: RESOLVED | Noted: 2023-11-03 | Resolved: 2024-10-03

## 2024-10-07 PROBLEM — I50.33 ACUTE ON CHRONIC DIASTOLIC HEART FAILURE: Status: RESOLVED | Noted: 2021-08-10 | Resolved: 2024-10-07

## 2024-10-07 NOTE — PROGRESS NOTES
C3 nurse attempted to contact Lroena Contreras  for a TCC post hospital discharge follow up call. No answer. Left voicemail with callback information. The patient does not have a scheduled HOSFU appointment.    Please contact patient and schedule follow up appointment using HOSFU visit type on or before 10/10/2024.    Message sent to PCP staff

## 2024-10-07 NOTE — PLAN OF CARE
[facility]  Discharge Final Note    Primary Care Provider: Jorge Paris MD    Expected Discharge Date: 10/3/2024    Final Discharge Note (most recent)       Final Note - 10/03/24 1752          Final Note    Assessment Type Final Discharge Note     Anticipated Discharge Disposition Home-Health Care Svc   [facility]    Hospital Resources/Appts/Education Provided Provided patient/caregiver with written discharge plan information;Provided education on problems/symptoms using teachback                   Future Appointments   Date Time Provider Department Center   10/9/2024  8:45 AM Raheem Bucio NP [facility] ORTHO [facility] [place]   10/24/2024  9:40 AM Jorge Paris MD [facility] MED [place]   11/5/2024 10:00 AM William Dhaliwal MD [facility] ID [facility]   11/6/2024 11:00 AM LAB[facility] [place]   11/13/2024  3:00 PM Gerardo Barbour MD [facility]     Important Message from Medicare  Important Message from Medicare regarding Discharge Appeal Rights: Explained to patient/caregiver, Given to patient/caregiver, Signed/date by patient/caregiver     Date IMM was signed: 10/03/24  Time IMM was signed: 1148

## 2024-10-08 ENCOUNTER — PATIENT MESSAGE (OUTPATIENT)
Dept: ADMINISTRATIVE | Facility: CLINIC | Age: 74
End: 2024-10-08
Payer: MEDICARE

## 2024-10-08 DIAGNOSIS — T81.30XA WOUND DEHISCENCE: Primary | ICD-10-CM

## 2024-10-08 NOTE — PROGRESS NOTES
Also taking Ativan 0.5 mg BID.    C3 nurse spoke with Lorena Contreras ( daughter)  for a TCC post hospital discharge follow up call. The patient has a scheduled HOSFU appointment with Jorge Paris MD on 10/24/2024 @ 0980.    Message sent to PCP staff

## 2024-10-09 ENCOUNTER — HOSPITAL ENCOUNTER (OUTPATIENT)
Dept: RADIOLOGY | Facility: HOSPITAL | Age: 74
Discharge: HOME | End: 2024-10-09
Attending: NURSE PRACTITIONER
Payer: MEDICARE

## 2024-10-09 ENCOUNTER — OFFICE VISIT (OUTPATIENT)
Dept: ORTHOPEDICS | Facility: CLINIC | Age: 74
End: 2024-10-09
Payer: MEDICARE

## 2024-10-09 DIAGNOSIS — R52 PAIN: Primary | ICD-10-CM

## 2024-10-09 DIAGNOSIS — R52 PAIN: ICD-10-CM

## 2024-10-09 DIAGNOSIS — S82.874D CLOSED NONDISPLACED PILON FRACTURE OF RIGHT TIBIA WITH ROUTINE HEALING, SUBSEQUENT ENCOUNTER: Primary | ICD-10-CM

## 2024-10-09 DIAGNOSIS — Z98.890 POST-OPERATIVE STATE: ICD-10-CM

## 2024-10-09 DIAGNOSIS — T81.30XA WOUND DEHISCENCE: ICD-10-CM

## 2024-10-09 DIAGNOSIS — S32.401D CLOSED DISPLACED FRACTURE OF RIGHT ACETABULUM WITH ROUTINE HEALING, UNSPECIFIED PORTION OF ACETABULUM, SUBSEQUENT ENCOUNTER: ICD-10-CM

## 2024-10-09 DIAGNOSIS — S32.511D CLOSED FRACTURE OF SUPERIOR RAMUS OF RIGHT PUBIS WITH ROUTINE HEALING, SUBSEQUENT ENCOUNTER: ICD-10-CM

## 2024-10-09 PROCEDURE — 3288F FALL RISK ASSESSMENT DOCD: CPT | Mod: HCNC,CPTII,S$GLB, | Performed by: NURSE PRACTITIONER

## 2024-10-09 PROCEDURE — 1101F PT FALLS ASSESS-DOCD LE1/YR: CPT | Mod: HCNC,CPTII,S$GLB, | Performed by: NURSE PRACTITIONER

## 2024-10-09 PROCEDURE — 99024 POSTOP FOLLOW-UP VISIT: CPT | Mod: HCNC,S$GLB,, | Performed by: NURSE PRACTITIONER

## 2024-10-09 PROCEDURE — 3052F HG A1C>EQUAL 8.0%<EQUAL 9.0%: CPT | Mod: HCNC,CPTII,S$GLB, | Performed by: NURSE PRACTITIONER

## 2024-10-09 PROCEDURE — 3066F NEPHROPATHY DOC TX: CPT | Mod: HCNC,CPTII,S$GLB, | Performed by: NURSE PRACTITIONER

## 2024-10-09 PROCEDURE — 3062F POS MACROALBUMINURIA REV: CPT | Mod: HCNC,CPTII,S$GLB, | Performed by: NURSE PRACTITIONER

## 2024-10-09 PROCEDURE — 1159F MED LIST DOCD IN RCRD: CPT | Mod: HCNC,CPTII,S$GLB, | Performed by: NURSE PRACTITIONER

## 2024-10-09 PROCEDURE — 1125F AMNT PAIN NOTED PAIN PRSNT: CPT | Mod: HCNC,CPTII,S$GLB, | Performed by: NURSE PRACTITIONER

## 2024-10-09 PROCEDURE — 72170 X-RAY EXAM OF PELVIS: CPT | Mod: 26,HCNC,, | Performed by: INTERNAL MEDICINE

## 2024-10-09 PROCEDURE — 99999 PR PBB SHADOW E&M-EST. PATIENT-LVL III: CPT | Mod: PBBFAC,HCNC,, | Performed by: NURSE PRACTITIONER

## 2024-10-09 PROCEDURE — 1160F RVW MEDS BY RX/DR IN RCRD: CPT | Mod: HCNC,CPTII,S$GLB, | Performed by: NURSE PRACTITIONER

## 2024-10-09 PROCEDURE — 72170 X-RAY EXAM OF PELVIS: CPT | Mod: TC,HCNC

## 2024-10-09 PROCEDURE — 73610 X-RAY EXAM OF ANKLE: CPT | Mod: 26,HCNC,RT, | Performed by: INTERNAL MEDICINE

## 2024-10-09 PROCEDURE — 73610 X-RAY EXAM OF ANKLE: CPT | Mod: TC,HCNC,RT

## 2024-10-09 RX ORDER — METHOCARBAMOL 500 MG/1
500 TABLET, FILM COATED ORAL 4 TIMES DAILY
Qty: 40 TABLET | Refills: 0 | Status: SHIPPED | OUTPATIENT
Start: 2024-10-09 | End: 2024-10-19

## 2024-10-09 RX ORDER — OXYCODONE HYDROCHLORIDE 5 MG/1
5 TABLET ORAL EVERY 8 HOURS PRN
Qty: 21 TABLET | Refills: 0 | Status: SHIPPED | OUTPATIENT
Start: 2024-10-09

## 2024-10-09 NOTE — PROGRESS NOTES
Ms. Contreras is here today for a post-operative visit after undergoing the following:    Fasciocutaneous flap right medial ankle  Excisional debridement of left ankle medial wound including skin, subcutaneous tissue, fascia, bone.  12 x 3 cm     by Dr. Davalos on 9/25/2024.    73-year-old female, diabetes, neuropathy, prior heart attack and stroke with fibromyalgia, restless leg syndrome and multiple medication allergies  Ground level fall 08/13/2024  Right ankle pilon fracture  Right both column acetabular fracture     08/14/2024 - ORIF right ankle pilon fracture     08/16/2024 - ORIF right both column acetabular fracture     Right ankle wound dehiscence 09/22/2024    Interval History:  She reports that she is doing ok.  She states their pain is tolerable.  She is taking pain medication.  She is working on transfers only, otherwise has been following her weight bearing restrictions.  She denies any falls or injuries since surgery/last seen in the clinic.  She denies fever, chills, and sweats since the time of the surgery.     Physical exam:  The patient's incisional wound vac dressing was taken down.  Incision is clean, dry and intact.  No signs of infection noted.   Sutures are present but too soon to remove.  Site was clean with chlorhexidine and then applied a xeroform dressing follow by 4x4 and covered with a tegaderm   She has tactile stimulation to their lower leg, she denies calf pain, there is no leg edema and their pedal pulse is palpable x 2.  No pain with ROM of her ankle.  Hip incision appears to be healing.  ROM of hip is 0-90 degrees.    RADS: right ankle and pelvis was obtained, findings show hardware to her right pelvic wing and acetabular appears to be in good position.  Fracture appears stable.  No evidence of hardware failure or new fracture seen.  Right ankle xray shows hardware to her distal tibia and fibula.  Hardware appears to be stable.  Fracture appears stable.  No evidence of hardware  failure or new fracture seen.  Ankle mortise is symmetrical.    Assessment:  Post-op visit (2 weeks)    Plan:    ICD-10-CM ICD-9-CM   1. Closed nondisplaced pilon fracture of right tibia with routine healing, subsequent encounter s/p ORIF 8/17/24  S82.874D V54.16   2. Closed fracture of superior ramus of right pubis with routine healing, subsequent encounter  S32.511D V54.19   3. Closed displaced fracture of right acetabulum with routine healing, unspecified portion of acetabulum, subsequent encounter  S32.401D V54.19   4. Wound dehiscence, right ankle  T81.30XA 998.30   5. Post-operative state  Z98.890 V45.89     Current care, treatment plan, precautions, activity level/ modifications, limitations, rehabilitation exercises and proposed future treatment were discussed with the patient. We discussed the need to monitor for changes in symptoms and condition and report them to the physician.  Discussed importance of compliance with all appointments and follow up examinations.     73-year-old female, diabetes, neuropathy, prior heart attack and stroke with fibromyalgia, restless leg syndrome and multiple medication allergies  Ground level fall 08/13/2024  Right ankle pilon fracture  Right both column acetabular fracture     08/14/2024 - ORIF right ankle pilon fracture     08/16/2024 - ORIF right both column acetabular fracture     Right ankle wound dehiscence 09/22/2024 09/25/2024 - I&D right ankle medial wound, fasciocutaneous flap closure     WOUND CARE ORDERS  - Do not remove surgical dressing for 2 weeks post-op. This will be done only by MD/BENIGNO at initial post-op visit. If dressing is completely saturated, Call number below.   - Do not get dressings wet.   - Do not shower.   - If dressing continues to be saturated or there are signs of infection, please call Ortho Clinic [phone] for further instructions and to make appt to be seen.     Antibiotics x 6 weeks per ID recs - currently on Doxycycline.  Cultures  NGTD.      PHYSICAL THERAPY:   - Physical therapy as ordered.  Patient is currently getting home health.  - Weight bearing NWB to RLE until 11-1-24  - Range of motion as tolerated.   - Strict elevation of the leg to aid in edema control and soft tissue healing      PAIN MEDICATION:   - Pain medication: refill was needed, I will refill her OxyIR and Robaxin.  - Pain medication refill policy provided to patient for review, yes.    - Patient was informed a multi-modal approach is used to treat their pain.     DVT PROPHYLAXIS:   - Eliquis 2.5 mg bid x 6 weeks    FRAGILITY:  - Patient has not been referred to the fracture clinic.     FOLLOW UP:   - Patient will follow up in the clinic in 1 weeks.  - X-ray of her right ankle and pelvis is NOT needed.  - At time consider removal of sutures if skin allows.  - Future Appointments:   Future Appointments   Date Time Provider Department Center   10/16/2024  2:30 PM Raheem Bucio NP [facility] [place]   10/24/2024  9:40 AM Jorge Paris MD [facility]   11/5/2024 10:00 AM William Dhaliwal MD [facility] ID [facility]   11/6/2024 11:00 AM LAB[facility] [place]   11/13/2024  3:00 PM Gerardo Barbour MD [facility]       If there are any questions prior to scheduled follow up, the patient was instructed to contact the office

## 2024-10-11 ENCOUNTER — TELEPHONE (OUTPATIENT)
Dept: ORTHOPEDICS | Facility: CLINIC | Age: 74
End: 2024-10-11
Payer: MEDICARE

## 2024-10-11 NOTE — TELEPHONE ENCOUNTER
I spoke with Kat with therapy.  They would like to hold off physical therapy until patient can start advancing her weight.  They report they have taught her and her sister what can be done with her limitations and they are competent in those activities.  Kat stated that when the patient can start bearing weight they will need new orders.

## 2024-10-16 ENCOUNTER — OFFICE VISIT (OUTPATIENT)
Dept: ORTHOPEDICS | Facility: CLINIC | Age: 74
End: 2024-10-16
Payer: MEDICARE

## 2024-10-16 DIAGNOSIS — S32.401D CLOSED DISPLACED FRACTURE OF RIGHT ACETABULUM WITH ROUTINE HEALING, UNSPECIFIED PORTION OF ACETABULUM, SUBSEQUENT ENCOUNTER: ICD-10-CM

## 2024-10-16 DIAGNOSIS — S32.511D CLOSED FRACTURE OF SUPERIOR RAMUS OF RIGHT PUBIS WITH ROUTINE HEALING, SUBSEQUENT ENCOUNTER: ICD-10-CM

## 2024-10-16 DIAGNOSIS — S82.874D CLOSED NONDISPLACED PILON FRACTURE OF RIGHT TIBIA WITH ROUTINE HEALING, SUBSEQUENT ENCOUNTER: ICD-10-CM

## 2024-10-16 DIAGNOSIS — T81.30XA WOUND DEHISCENCE: ICD-10-CM

## 2024-10-16 DIAGNOSIS — Z48.02 VISIT FOR SUTURE REMOVAL: Primary | ICD-10-CM

## 2024-10-16 DIAGNOSIS — Z98.890 POST-OPERATIVE STATE: ICD-10-CM

## 2024-10-16 PROCEDURE — 99999 PR PBB SHADOW E&M-EST. PATIENT-LVL III: CPT | Mod: PBBFAC,HCNC,, | Performed by: NURSE PRACTITIONER

## 2024-10-16 PROCEDURE — 1101F PT FALLS ASSESS-DOCD LE1/YR: CPT | Mod: HCNC,CPTII,S$GLB, | Performed by: NURSE PRACTITIONER

## 2024-10-16 PROCEDURE — 3066F NEPHROPATHY DOC TX: CPT | Mod: HCNC,CPTII,S$GLB, | Performed by: NURSE PRACTITIONER

## 2024-10-16 PROCEDURE — 1125F AMNT PAIN NOTED PAIN PRSNT: CPT | Mod: HCNC,CPTII,S$GLB, | Performed by: NURSE PRACTITIONER

## 2024-10-16 PROCEDURE — 99024 POSTOP FOLLOW-UP VISIT: CPT | Mod: HCNC,S$GLB,, | Performed by: NURSE PRACTITIONER

## 2024-10-16 PROCEDURE — 3052F HG A1C>EQUAL 8.0%<EQUAL 9.0%: CPT | Mod: HCNC,CPTII,S$GLB, | Performed by: NURSE PRACTITIONER

## 2024-10-16 PROCEDURE — 3288F FALL RISK ASSESSMENT DOCD: CPT | Mod: HCNC,CPTII,S$GLB, | Performed by: NURSE PRACTITIONER

## 2024-10-16 PROCEDURE — 3062F POS MACROALBUMINURIA REV: CPT | Mod: HCNC,CPTII,S$GLB, | Performed by: NURSE PRACTITIONER

## 2024-10-16 PROCEDURE — 1159F MED LIST DOCD IN RCRD: CPT | Mod: HCNC,CPTII,S$GLB, | Performed by: NURSE PRACTITIONER

## 2024-10-16 PROCEDURE — 1160F RVW MEDS BY RX/DR IN RCRD: CPT | Mod: HCNC,CPTII,S$GLB, | Performed by: NURSE PRACTITIONER

## 2024-10-16 RX ORDER — OXYCODONE HYDROCHLORIDE 5 MG/1
5 TABLET ORAL EVERY 8 HOURS PRN
Qty: 21 TABLET | Refills: 0 | Status: SHIPPED | OUTPATIENT
Start: 2024-10-16

## 2024-10-16 NOTE — PROGRESS NOTES
Ms. Contreras is here today for a post-operative visit after undergoing the following:    Fasciocutaneous flap right medial ankle  Excisional debridement of left ankle medial wound including skin, subcutaneous tissue, fascia, bone.  12 x 3 cm     by Dr. Davalos on 9/25/2024.    73-year-old female, diabetes, neuropathy, prior heart attack and stroke with fibromyalgia, restless leg syndrome and multiple medication allergies  Ground level fall 08/13/2024  Right ankle pilon fracture  Right both column acetabular fracture     08/14/2024 - ORIF right ankle pilon fracture     08/16/2024 - ORIF right both column acetabular fracture     Right ankle wound dehiscence 09/22/2024    Interval History:  She reports that she is doing ok.  She states their pain is tolerable.  She is taking pain medication.  She is working on transfers only, otherwise has been following her weight bearing restrictions.  She is taking IV antibiotics as instructed.  She denies any falls or injuries since surgery/last seen in the clinic.  She denies fever, chills, and sweats since the time of the surgery.     Physical exam:  The patient's dressing was taken down..  Incision is clean, dry and intact.  No signs of infection noted.  Sutures removed without difficulty.  She has tactile stimulation to their lower leg, she denies calf pain, there is no leg edema and their pedal pulse is palpable x 2.  No pain with ROM of her ankle.  Hip incision appears to be healing.  ROM of hip is 0-90 degrees.    RADS:  None.    Assessment:  Post-op visit (3 weeks)    Plan:    ICD-10-CM ICD-9-CM   1. Visit for suture removal  Z48.02 V58.32   2. Closed nondisplaced pilon fracture of right tibia with routine healing, subsequent encounter s/p ORIF 8/17/24  S82.874D V54.16   3. Closed fracture of superior ramus of right pubis with routine healing, subsequent encounter  S32.511D V54.19   4. Wound dehiscence, right ankle  T81.30XA 998.30   5. Post-operative state  Z98.890  V45.89       Current care, treatment plan, precautions, activity level/ modifications, limitations, rehabilitation exercises and proposed future treatment were discussed with the patient. We discussed the need to monitor for changes in symptoms and condition and report them to the physician.  Discussed importance of compliance with all appointments and follow up examinations.     73-year-old female, diabetes, neuropathy, prior heart attack and stroke with fibromyalgia, restless leg syndrome and multiple medication allergies  Ground level fall 08/13/2024  Right ankle pilon fracture  Right both column acetabular fracture     08/14/2024 - ORIF right ankle pilon fracture     08/16/2024 - ORIF right both column acetabular fracture     Right ankle wound dehiscence 09/22/2024 09/25/2024 - I&D right ankle medial wound, fasciocutaneous flap closure     WOUND CARE ORDERS  -Lorena was advised to keep the incision clean and dry for the next 24 hours after which she may wash the area with antibacterial soap in the shower.   -She not submerge until the incision is completely healed  -Patient was advised to monitor wound closely and multiple times daily for any problems. Call clinic immediately or report to ED for immediate medical attention for any complications including reopening of wound, drainage, purulence, redness, streaking, odor, pain out of proportion, fever, chills, etc.   - If there are signs of infection, please call Ortho Clinic [phone] for further instructions and to make appt to be seen.     Antibiotics x 6 weeks per ID recs - currently on Doxycycline and IV vancomycin.  Cultures NGTD.      PHYSICAL THERAPY:   - Physical therapy as ordered.  Patient is currently getting home health.  - Weight bearing NWB to RLE until 11-1-24  - Range of motion as tolerated.   - Strict elevation of the leg to aid in edema control and soft tissue healing      PAIN MEDICATION:   - Pain medication: refill was not needed.  -  Pain medication refill policy provided to patient for review, yes.    - Patient was informed a multi-modal approach is used to treat their pain.     DVT PROPHYLAXIS:   - Eliquis 2.5 mg bid x 6 weeks    FRAGILITY:  - Patient has not been referred to the fracture clinic.     FOLLOW UP:   - Patient will follow up in the clinic in 3 weeks.  - X-ray of her right ankle and pelvis is needed.  - At time consider advancing her weight-bearing status.  - Future Appointments:   Future Appointments   Date Time Provider Department Center   10/24/2024  9:40 AM Jorge Paris MD [facility]   11/5/2024 10:00 AM William Dhaliwal MD [facility] ID [facility]   11/6/2024 11:00 AM LAB, [facility] [place]   11/6/2024  2:30 PM Raheem Bucio NP [facility] ORTHO [facility] [place]   11/13/2024  3:00 PM Gerardo Barbour MD [facility]       If there are any questions prior to scheduled follow up, the patient was instructed to contact the office

## 2024-10-20 ENCOUNTER — PATIENT MESSAGE (OUTPATIENT)
Dept: CARDIOLOGY | Facility: CLINIC | Age: 74
End: 2024-10-20
Payer: MEDICARE

## 2024-10-20 DIAGNOSIS — I25.118 CORONARY ARTERY DISEASE OF NATIVE ARTERY OF NATIVE HEART WITH STABLE ANGINA PECTORIS: ICD-10-CM

## 2024-10-20 NOTE — TELEPHONE ENCOUNTER
No care due was identified.  [facility] Embedded Care Due Messages. Reference number: [id number].   10/20/2024 5:45:48 PM CDT

## 2024-10-21 ENCOUNTER — PATIENT OUTREACH (OUTPATIENT)
Dept: ADMINISTRATIVE | Facility: OTHER | Age: 74
End: 2024-10-21
Payer: MEDICARE

## 2024-10-21 ENCOUNTER — PATIENT MESSAGE (OUTPATIENT)
Dept: ENDOCRINOLOGY | Facility: CLINIC | Age: 74
End: 2024-10-21
Payer: MEDICARE

## 2024-10-21 ENCOUNTER — TELEPHONE (OUTPATIENT)
Dept: FAMILY MEDICINE | Facility: CLINIC | Age: 74
End: 2024-10-21
Payer: MEDICARE

## 2024-10-21 RX ORDER — TICAGRELOR 90 MG/1
90 TABLET ORAL 2 TIMES DAILY
Qty: 180 TABLET | Refills: 0 | Status: SHIPPED | OUTPATIENT
Start: 2024-10-21

## 2024-10-21 NOTE — PROGRESS NOTES
CHW - Case Closure    This Community Health Worker spoke to patient via telephone today.   Pt reported: Patient states she has no needs, nor request assistance at this time. Patient has graduated and agreed to case closure.   Pt denied any additional needs at this time and agrees with episode closure at this time.  Provided patient with Community Health Worker's contact information and encouraged her to contact this Community Health Worker if additional needs arise.

## 2024-10-21 NOTE — TELEPHONE ENCOUNTER
----- Message from Nneka sent at 10/21/2024  4:44 PM CDT -----  Regarding: call back  Type: Patient Call Back    Who called: erin daughter     What is the request in detail: requesting upcoming appt rescheduled to a virtual appt if possible, unsure why pt is being seen or if she can be seen virtually. Pt is bedbound and daughter does not want to make her come to clinic.     Can the clinic reply by [facility]?no    Would the patient rather a call back or a response via [facility]? call    Best call back number: [phone]      Additional Information:

## 2024-10-22 ENCOUNTER — TELEPHONE (OUTPATIENT)
Dept: INFECTIOUS DISEASES | Facility: CLINIC | Age: 74
End: 2024-10-22
Payer: MEDICARE

## 2024-10-22 NOTE — TELEPHONE ENCOUNTER
----- Message from Megan sent at 10/21/2024  5:36 PM CDT -----  Regarding: PT ADVICE  Contact: PT'S DAUGHTER  Pt's daughter called regarding changing pt's appt to a virtual appt.     Please advise. Pt can be reached at [phone]

## 2024-10-22 NOTE — TELEPHONE ENCOUNTER
Spoke with pt daughter and rescheduled appt to virtual per her request. Pt understood and confirmed.

## 2024-10-22 NOTE — TELEPHONE ENCOUNTER
Call placed to patient and notified that 10-24-24 appointment was changed to virtual via MD approval. Understanding and thank you verbalized.

## 2024-10-23 DIAGNOSIS — S32.511D CLOSED FRACTURE OF SUPERIOR RAMUS OF RIGHT PUBIS WITH ROUTINE HEALING, SUBSEQUENT ENCOUNTER: ICD-10-CM

## 2024-10-23 DIAGNOSIS — S32.401D CLOSED DISPLACED FRACTURE OF RIGHT ACETABULUM WITH ROUTINE HEALING, UNSPECIFIED PORTION OF ACETABULUM, SUBSEQUENT ENCOUNTER: ICD-10-CM

## 2024-10-23 DIAGNOSIS — S82.874D CLOSED NONDISPLACED PILON FRACTURE OF RIGHT TIBIA WITH ROUTINE HEALING, SUBSEQUENT ENCOUNTER: ICD-10-CM

## 2024-10-24 ENCOUNTER — TELEPHONE (OUTPATIENT)
Dept: FAMILY MEDICINE | Facility: CLINIC | Age: 74
End: 2024-10-24
Payer: MEDICARE

## 2024-10-24 ENCOUNTER — OFFICE VISIT (OUTPATIENT)
Dept: FAMILY MEDICINE | Facility: CLINIC | Age: 74
End: 2024-10-24
Payer: MEDICARE

## 2024-10-24 DIAGNOSIS — R20.0 HAND NUMBNESS: ICD-10-CM

## 2024-10-24 DIAGNOSIS — M62.838 NECK MUSCLE SPASM: ICD-10-CM

## 2024-10-24 DIAGNOSIS — A04.72 C. DIFFICILE COLITIS: ICD-10-CM

## 2024-10-24 DIAGNOSIS — N18.31 TYPE 2 DIABETES MELLITUS WITH STAGE 3A CHRONIC KIDNEY DISEASE, WITH LONG-TERM CURRENT USE OF INSULIN: ICD-10-CM

## 2024-10-24 DIAGNOSIS — I25.118 CORONARY ARTERY DISEASE OF NATIVE ARTERY OF NATIVE HEART WITH STABLE ANGINA PECTORIS: ICD-10-CM

## 2024-10-24 DIAGNOSIS — N18.32 STAGE 3B CHRONIC KIDNEY DISEASE: ICD-10-CM

## 2024-10-24 DIAGNOSIS — E11.22 TYPE 2 DIABETES MELLITUS WITH STAGE 3A CHRONIC KIDNEY DISEASE, WITH LONG-TERM CURRENT USE OF INSULIN: ICD-10-CM

## 2024-10-24 DIAGNOSIS — D64.9 CHRONIC ANEMIA: Primary | ICD-10-CM

## 2024-10-24 DIAGNOSIS — I10 PRIMARY HYPERTENSION: Primary | ICD-10-CM

## 2024-10-24 DIAGNOSIS — F41.9 ANXIETY: Chronic | ICD-10-CM

## 2024-10-24 DIAGNOSIS — Z79.4 TYPE 2 DIABETES MELLITUS WITH STAGE 3A CHRONIC KIDNEY DISEASE, WITH LONG-TERM CURRENT USE OF INSULIN: ICD-10-CM

## 2024-10-24 PROCEDURE — 3062F POS MACROALBUMINURIA REV: CPT | Mod: HCNC,CPTII,95, | Performed by: INTERNAL MEDICINE

## 2024-10-24 PROCEDURE — 3066F NEPHROPATHY DOC TX: CPT | Mod: HCNC,CPTII,95, | Performed by: INTERNAL MEDICINE

## 2024-10-24 PROCEDURE — 99214 OFFICE O/P EST MOD 30 MIN: CPT | Mod: HCNC,95,, | Performed by: INTERNAL MEDICINE

## 2024-10-24 PROCEDURE — 3052F HG A1C>EQUAL 8.0%<EQUAL 9.0%: CPT | Mod: HCNC,CPTII,95, | Performed by: INTERNAL MEDICINE

## 2024-10-24 PROCEDURE — 1111F DSCHRG MED/CURRENT MED MERGE: CPT | Mod: HCNC,CPTII,95, | Performed by: INTERNAL MEDICINE

## 2024-10-24 RX ORDER — HYDRALAZINE HYDROCHLORIDE 100 MG/1
100 TABLET, FILM COATED ORAL EVERY 8 HOURS
Qty: 135 TABLET | Refills: 3 | Status: SHIPPED | OUTPATIENT
Start: 2024-10-24 | End: 2025-10-24

## 2024-10-24 RX ORDER — INSULIN GLARGINE 100 [IU]/ML
12 INJECTION, SOLUTION SUBCUTANEOUS NIGHTLY
Start: 2024-10-24 | End: 2025-10-24

## 2024-10-24 RX ORDER — LORAZEPAM 1 MG/1
1 TABLET ORAL DAILY PRN
Qty: 30 TABLET | Refills: 0 | Status: SHIPPED | OUTPATIENT
Start: 2024-10-24

## 2024-10-24 RX ORDER — QUETIAPINE FUMARATE 50 MG/1
50 TABLET, FILM COATED ORAL NIGHTLY PRN
Qty: 90 TABLET | Refills: 3 | Status: SHIPPED | OUTPATIENT
Start: 2024-10-24 | End: 2025-10-24

## 2024-10-24 RX ORDER — FUROSEMIDE 40 MG/1
40 TABLET ORAL 2 TIMES DAILY
Qty: 60 TABLET | Refills: 2 | Status: SHIPPED | OUTPATIENT
Start: 2024-10-24 | End: 2025-10-24

## 2024-10-24 RX ORDER — OXYCODONE HYDROCHLORIDE 5 MG/1
5 TABLET ORAL EVERY 8 HOURS PRN
Qty: 21 TABLET | Refills: 0 | Status: SHIPPED | OUTPATIENT
Start: 2024-10-24

## 2024-10-24 NOTE — PROGRESS NOTES
"  Subjective:     History of Present Illness    CHIEF COMPLAINT:  Ms. Contreras presents today for follow-up on multiple health issues, including nausea, weakness, and recent hospitalization.    GASTROINTESTINAL ISSUES:  She reports persistent nausea and vomiting, stating she is vomiting everything she eats. She experiences low appetite and food aversion. She is taking Zofran 8 mg oral dissolving tablets for nausea, which provides some relief. She is also drinking Mylanta and taking pantoprazole. She is being treated for C. diff infection with oral vancomycin and notes improvement in diarrhea since starting the medication.    RECENT SURGERIES AND COMPLICATIONS:  She underwent right ankle surgery on the 26th, which required subsequent re-cleaning due to complications. She experienced significant swelling while in a care facility and reports that the ankle "exploded" when she attempted to step on it while being discharged. Her stitches were removed last Wednesday, and she has a follow-up visit with the orthopedist in two weeks.    MOBILITY AND WEAKNESS:  She reports significant weakness, particularly in her lower body. She is currently performing upper body exercises but not lower body exercises due to restrictions. She is unable to put weight on her right ankle and has been advised not to do so for approximately two more weeks. She notes some improvement in mobility, stating she can now swing her leg out, which she was unable to do a week ago. She expresses difficulty with leg exercises, specifically mentioning challenges with knee lifts due to weakness in her thighs. She reports needing to assist herself manually to lift her leg during these exercises.    ANEMIA:  She reports low blood levels, initially discovered during her second surgery and hospital stay, requiring a blood transfusion. Upon discharge, her hemoglobin levels remained low. She is currently taking iron supplements at home but reports feeling " progressively weaker, raising concerns about her current blood levels.    DIABETES MANAGEMENT:  She stopped Ozempic due to blood sugar fluctuations, experiencing hypoglycemic episodes with glucose levels dropping into the 50s and 40s after starting probiotics for C. diff. She discontinued the probiotics, which has led to improved blood sugar balance. She is currently taking Lantus insulin 12 units at night, reduced from 16 units prescribed during a recent hospitalization. She continues to use NovoLog insulin with meals on a sliding scale.    ANXIETY AND PAIN MANAGEMENT:  She reports experiencing significant anxiety and is currently taking Ativan for management, though her supply is running low with approximately one week's worth remaining. She notes that Ativan may not be listed on her current medication list, possibly due to it being prescribed during a recent hospital stay. She continues to use oxycodone for pain management.    MUSCULOSKELETAL ISSUES:  She reports experiencing numbness in her hands since a fall. She also complains of neck pain that occurs when sitting up at a certain height.   She finds relief from her neck discomfort with the application of Icy Hot, which is sometimes applied by her sister after bathing.    ROS:  General: +loss of appetite  Gastrointestinal: +nausea, +vomiting, -diarrhea  Musculoskeletal: +muscle weakness, +neck pain  Neurological: +numbness, +weakness  Psychiatric: +anxiety         Objective:     There were no vitals filed for this visit.    Physical Exam    MSK: Foot/Ankle - Right: Right ankle swollen post-surgery.         Assessment/Plan     1. Primary hypertension  hydrALAZINE (APRESOLINE) 100 MG tablet    furosemide (LASIX) 40 MG tablet      2. Type 2 diabetes mellitus with stage 3a chronic kidney disease, with long-term current use of insulin  insulin glargine U-100, Lantus, 100 unit/mL (3 mL) SubQ InPn pen      3. Coronary artery disease of native artery of native heart  with stable angina pectoris        4. Stage 3b chronic kidney disease        5. Anxiety  QUEtiapine (SEROQUEL) 50 MG tablet    LORazepam (ATIVAN) 1 MG tablet      6. Hand numbness        7. Neck muscle spasm        8. C. difficile colitis            Assessment & Plan     Assessed ongoing nausea and vomiting, likely attributed to doxycycline and possibly exacerbated by oxycodone   Evaluated glucose fluctuations, noting recent hypoglycemic episodes possibly related to probiotic use   Considered adjusting anti-nausea medication regimen given persistent symptoms despite current Zofran use   Reviewed weakness and limited mobility, acknowledging progress in upper body exercises while awaiting clearance for weight-bearing on right ankle   Monitored C. diff infection treatment, continuing vancomycin until completion of doxycycline course for foot infection   Assessed blood pressure management, awaiting new home readings to guide potential medication adjustments    K21.9 GASTRO-ESOPHAGEAL REFLUX DISEASE WITHOUT ESOPHAGITIS:   Continued pantoprazole for acid reflux.    F41.1 GENERALIZED ANXIETY DISORDER:   Refilled lorazepam (Ativan) for anxiety.    D64.9 ANEMIA, UNSPECIFIED:   Ordered hemoglobin and iron level tests to be performed by home health nurse.    I10 ESSENTIAL (PRIMARY) HYPERTENSION:   Ms. Contreras to obtain new blood pressure monitoring machine for home use.   Continued Lasix; cannot be discontinued due to its importance for managing swelling and blood pressure.   Refilled Lasix.   Refilled hydralazine.   Contact the office to report new blood pressure readings once new machine is obtained.    Z47.89 ENCOUNTER FOR OTHER ORTHOPEDIC AFTERCARE:   Ms. Contreras to avoid weight-bearing on right ankle for approximately 2 more weeks.   Follow up with orthopedic appointment on November 6th for ankle evaluation.    E11.649 TYPE 2 DIABETES MELLITUS WITH HYPOGLYCEMIA WITHOUT COMA:   Discussed the potential interaction  between probiotics and glucose levels, noting observed effects on diabetes management.   Decreased Lantus insulin from 16 units to 12 units at night.   Continued NovoLog insulin with meals using sliding scale.   Discontinued Ozempic injections.    R11.2 NAUSEA WITH VOMITING, UNSPECIFIED:   Emphasized the importance of taking doxycycline with food to reduce GI side effects.   Started doxycycline for foot infection; advised taking with meals to reduce nausea.   Continued Zofran 8 mg oral dissolving tablets as needed for nausea.   Contact the office with updates on symptom progression, particularly nausea and vomiting, before next appointment.    G89.29 OTHER CHRONIC PAIN:   Continued oxycodone 5 mg immediate release every 8 hours as needed for pain; advised not to take concurrently with lorazepam.    M25.571 PAIN IN RIGHT ANKLE AND JOINTS OF RIGHT FOOT:   Continuing doxycycline for foot infection. Following with Infectious Diseases    A04.71 ENTEROCOLITIS DUE TO CLOSTRIDIUM DIFFICILE:   Continued vancomycin for C. diff infection until doxycycline course is completed.    LIFESTYLE CHANGES:   Ms. Contreras to continue upper body exercises as tolerated.         This note was generated with the assistance of ambient listening technology. Verbal consent was obtained by the patient and accompanying visitor(s) for the recording of patient appointment to facilitate this note. I attest to having reviewed and edited the generated note for accuracy, though some syntax or spelling errors may persist. Please contact the author of this note for any clarification.      Jorge Paris MD  Internal Medicine-Pediatrics

## 2024-10-24 NOTE — TELEPHONE ENCOUNTER
Embarazo    El examen que se le hizo hoy indica que usted está embarazada.  Síntomas de embarazo  Maria Guadalupe el embarazo, las hormonas de canas cuerpo cambian. [place] provoca cambios físicos y emocionales. Es normal. Saber qué esperar la tranquilizará y sabrá cuándo buscar ayuda si tiene algún problema. Estos son algunos de los síntomas más comunes:  · Náuseas por la mañana. En realidad, las náuseas pueden darse en cualquier momento del día o la noche  · Sensibilidad e inflamación de los senos  · Necesidad de orinar con más frecuencia  · Fatiga o cansancio  · Mareo  · Indigestión o acidez estomacal  · Deseos de comer ciertos alimentos o sentir asco por otros  · Estreñimiento  · Cambios emocionales. Estos cambios pueden ir desde ansiedad a excitación y depresión.  Indicaciones para tener un embarazo saludable  Aquí tiene algunas cosas que puede hacer para asegurarse de tener un bebé amarjit:  · Descanse cuando se sienta cansada. Especialmente, maria guadalupe los últimos meses del embarazo.  · Dona más líquidos. Canas cuerpo necesita más líquidos de los que usted quizás acostumbre a consumir. Dona entre ocho y gallo vasos de jugo, leche o agua todos los días.  · Coma de manera balanceada. Coma a horarios regulares para darle a canas cuerpo proteínas suficientes. Es de esperarse que aumente alrededor de 30 libras (15 kilos) maria guadalupe el embarazo. No intente hacer dieta ni bajar de peso mientras esté embarazada.  · [place] reji vitamina prenatal al día. Eso le ayudará a satisfacer las necesidades de mayor nutrición que tiene el embarazo.  · No tome ningún otro medicamento mientras esté embarazada a menos que canas médico le indique hacerlo. No tome ni siquiera medicamentos recetados ni de venta sylvain. Muchos medicamentos pueden dañar al bebé que está creciendo en canas vientre.  · Si tiene náuseas o vómito, no coma alimentos grasosos ni fritos. Coma varias comidas más pequeñas a lo roselyn del día en lugar de jami comidas grandes.  · Si fuma, debe dejarlo.  ----- Message from Nurse Araya sent at 10/23/2024 11:39 AM CDT -----  Regarding: FW: [facility] [place] [facility] nurse  Pt requesting order for anemia lab  ----- Message -----  From: Eden Sandhu  Sent: 10/23/2024   9:25 AM CDT  To: [facility] Staff  Subject: [facility] [place] [facility] nurse                       Caller is requesting to schedule their Lab appointment prior to annual appointment.    Order is not listed in EPIC.  Please enter order and contact patient to schedule.    Name of Caller:Saud Martin Date and Time of Labs: asap ( for anemia)     Date of EPP Appointment: 10/24    Where would they like the lab performed? At home     Would the patient rather a call back or a response via [facility]? Call back     Best Call Back Number:[phone]      Additional Information:    Thank you.   La nicotina que usted respira va directo al bebé.  · No tome alcohol, ni siquiera en cantidades moderadas. Si chetna a diario, perjudicará la kirsten de canas bebé y puede provocarle daño cerebral permanente.  · No use drogas recreativas, especialmente, cocaína, crack y heroína. Esas dañarán a canas bebé. Evite también la marihuana.  · Si estaba consumiendo drogas recreativas o medicamentos recetados cuando se enteró de que estaba embarazada, hable con canas proveedor de atención médica respecto de los posibles efectos que ello podría tener sobre el bebé que está creciendo.  · Si tiene algún problema médico para el que necesite juancho medicamentos, hable con canas médico.  Visita de control  Llame a canas proveedor de atención médica para coordinar la atención prenatal. La atención médica prenatal es importante. Puede matteo a un médico de greg, a un especialista en embarazo (obstetra) o a un clínico de atención médica primaria.  ¿Cuándo debe buscar atención médica?  Llame a canas proveedor de atención médica de inmediato si sucede cualquiera de las siguientes cosas:  · Sangrado vaginal  · Dolor en canas abdomen o en canas espalda que es moderado o muy meli  · Vomita mucho o no puede mantener ningún líquido en el estómago por seis horas  · Ardor al orinar  · Dolor de elke, mareos o rápido aumento de peso  · Fiebre  · Cambios en canas visión o visión borrosa  © [id number] [employer]. [address] [place] [address], [place] [address]. Todos los derechos reservados. Esta información no pretende sustituir la atención médica profesional. Sólo canas médico puede diagnosticar y tratar un problema de kirsten.

## 2024-10-25 ENCOUNTER — PATIENT MESSAGE (OUTPATIENT)
Dept: FAMILY MEDICINE | Facility: CLINIC | Age: 74
End: 2024-10-25
Payer: MEDICARE

## 2024-10-28 ENCOUNTER — EXTERNAL HOME HEALTH (OUTPATIENT)
Dept: HOME HEALTH SERVICES | Facility: HOSPITAL | Age: 74
End: 2024-10-28
Payer: MEDICARE

## 2024-10-28 ENCOUNTER — TELEPHONE (OUTPATIENT)
Dept: FAMILY MEDICINE | Facility: CLINIC | Age: 74
End: 2024-10-28
Payer: MEDICARE

## 2024-10-28 DIAGNOSIS — F41.9 ANXIETY: Chronic | ICD-10-CM

## 2024-10-28 RX ORDER — LORAZEPAM 1 MG/1
TABLET ORAL
Qty: 30 TABLET | Refills: 0 | OUTPATIENT
Start: 2024-10-28

## 2024-10-28 RX ORDER — LORAZEPAM 1 MG/1
TABLET ORAL
Qty: 30 TABLET | Refills: 0 | Status: ON HOLD | OUTPATIENT
Start: 2024-10-28

## 2024-10-29 RX ORDER — QUETIAPINE FUMARATE 50 MG/1
50 TABLET, FILM COATED ORAL NIGHTLY PRN
Qty: 90 TABLET | Refills: 3 | Status: ON HOLD | OUTPATIENT
Start: 2024-10-29 | End: 2025-10-29

## 2024-10-30 ENCOUNTER — TELEPHONE (OUTPATIENT)
Dept: INFECTIOUS DISEASES | Facility: CLINIC | Age: 74
End: 2024-10-30
Payer: MEDICARE

## 2024-10-30 ENCOUNTER — HOSPITAL ENCOUNTER (INPATIENT)
Facility: HOSPITAL | Age: 74
LOS: 5 days | Discharge: HOME | DRG: 372 | End: 2024-11-05
Attending: EMERGENCY MEDICINE | Admitting: HOSPITALIST
Payer: MEDICARE

## 2024-10-30 ENCOUNTER — NURSE TRIAGE (OUTPATIENT)
Dept: ADMINISTRATIVE | Facility: CLINIC | Age: 74
End: 2024-10-30
Payer: MEDICARE

## 2024-10-30 DIAGNOSIS — N17.9 ACUTE KIDNEY INJURY SUPERIMPOSED ON CHRONIC KIDNEY DISEASE: ICD-10-CM

## 2024-10-30 DIAGNOSIS — N18.9 ACUTE KIDNEY INJURY SUPERIMPOSED ON CHRONIC KIDNEY DISEASE: ICD-10-CM

## 2024-10-30 DIAGNOSIS — E87.70 FLUID EXCESS: ICD-10-CM

## 2024-10-30 DIAGNOSIS — K92.2 GASTROINTESTINAL HEMORRHAGE, UNSPECIFIED GASTROINTESTINAL HEMORRHAGE TYPE: Primary | ICD-10-CM

## 2024-10-30 DIAGNOSIS — A04.72 C. DIFFICILE COLITIS: ICD-10-CM

## 2024-10-30 DIAGNOSIS — K92.1 HEMATOCHEZIA: ICD-10-CM

## 2024-10-30 DIAGNOSIS — K62.5 RECTAL BLEEDING: ICD-10-CM

## 2024-10-30 DIAGNOSIS — Z91.89 AT RISK FOR LONG QT SYNDROME: ICD-10-CM

## 2024-10-30 DIAGNOSIS — R79.89 ELEVATED TROPONIN: ICD-10-CM

## 2024-10-30 DIAGNOSIS — I10 PRIMARY HYPERTENSION: ICD-10-CM

## 2024-10-30 DIAGNOSIS — R53.83 FATIGUE: ICD-10-CM

## 2024-10-30 DIAGNOSIS — R07.9 CHEST PAIN: ICD-10-CM

## 2024-10-30 PROBLEM — I25.10 CORONARY ARTERY DISEASE: Status: ACTIVE | Noted: 2024-10-30

## 2024-10-30 PROBLEM — D64.9 ANEMIA: Status: ACTIVE | Noted: 2024-10-30

## 2024-10-30 LAB
ABO + RH BLD: NORMAL
ALBUMIN SERPL BCP-MCNC: 2.5 G/DL (ref 3.5–5.2)
ALP SERPL-CCNC: 181 U/L (ref 40–150)
ALT SERPL W/O P-5'-P-CCNC: 37 U/L (ref 10–44)
ANION GAP SERPL CALC-SCNC: 13 MMOL/L (ref 8–16)
APTT PPP: 28.9 SEC (ref 21–32)
AST SERPL-CCNC: 51 U/L (ref 10–40)
BASOPHILS # BLD AUTO: 0.1 K/UL (ref 0–0.2)
BASOPHILS NFR BLD: 0.9 % (ref 0–1.9)
BILIRUB SERPL-MCNC: 0.4 MG/DL (ref 0.1–1)
BLD GP AB SCN CELLS X3 SERPL QL: NORMAL
BUN SERPL-MCNC: 67 MG/DL (ref 8–23)
CALCIUM SERPL-MCNC: 9.3 MG/DL (ref 8.7–10.5)
CHLORIDE SERPL-SCNC: 100 MMOL/L (ref 95–110)
CO2 SERPL-SCNC: 23 MMOL/L (ref 23–29)
CREAT SERPL-MCNC: 2.7 MG/DL (ref 0.5–1.4)
DIFFERENTIAL METHOD BLD: ABNORMAL
EOSINOPHIL # BLD AUTO: 0.2 K/UL (ref 0–0.5)
EOSINOPHIL NFR BLD: 2.1 % (ref 0–8)
ERYTHROCYTE [DISTWIDTH] IN BLOOD BY AUTOMATED COUNT: 14.5 % (ref 11.5–14.5)
EST. GFR  (NO RACE VARIABLE): 18 ML/MIN/1.73 M^2
GLUCOSE SERPL-MCNC: 147 MG/DL (ref 70–110)
HCT VFR BLD AUTO: 27.5 % (ref 37–48.5)
HGB BLD-MCNC: 8.8 G/DL (ref 12–16)
IMM GRANULOCYTES # BLD AUTO: 0.06 K/UL (ref 0–0.04)
IMM GRANULOCYTES NFR BLD AUTO: 0.5 % (ref 0–0.5)
INR PPP: 1 (ref 0.8–1.2)
LYMPHOCYTES # BLD AUTO: 1.7 K/UL (ref 1–4.8)
LYMPHOCYTES NFR BLD: 15.5 % (ref 18–48)
MCH RBC QN AUTO: 27.9 PG (ref 27–31)
MCHC RBC AUTO-ENTMCNC: 32 G/DL (ref 32–36)
MCV RBC AUTO: 87 FL (ref 82–98)
MONOCYTES # BLD AUTO: 1.1 K/UL (ref 0.3–1)
MONOCYTES NFR BLD: 10.4 % (ref 4–15)
NEUTROPHILS # BLD AUTO: 7.8 K/UL (ref 1.8–7.7)
NEUTROPHILS NFR BLD: 70.6 % (ref 38–73)
NRBC BLD-RTO: 0 /100 WBC
PLATELET # BLD AUTO: 222 K/UL (ref 150–450)
PMV BLD AUTO: 13.5 FL (ref 9.2–12.9)
POTASSIUM SERPL-SCNC: 4.2 MMOL/L (ref 3.5–5.1)
PROT SERPL-MCNC: 6.5 G/DL (ref 6–8.4)
PROTHROMBIN TIME: 10.8 SEC (ref 9–12.5)
RBC # BLD AUTO: 3.15 M/UL (ref 4–5.4)
SODIUM SERPL-SCNC: 136 MMOL/L (ref 136–145)
SPECIMEN OUTDATE: NORMAL
TROPONIN I SERPL DL<=0.01 NG/ML-MCNC: 0.07 NG/ML (ref 0–0.03)
WBC # BLD AUTO: 10.99 K/UL (ref 3.9–12.7)

## 2024-10-30 PROCEDURE — 85610 PROTHROMBIN TIME: CPT | Mod: HCNC | Performed by: EMERGENCY MEDICINE

## 2024-10-30 PROCEDURE — 93010 ELECTROCARDIOGRAM REPORT: CPT | Mod: HCNC,,, | Performed by: INTERNAL MEDICINE

## 2024-10-30 PROCEDURE — 80053 COMPREHEN METABOLIC PANEL: CPT | Mod: HCNC | Performed by: EMERGENCY MEDICINE

## 2024-10-30 PROCEDURE — 86850 RBC ANTIBODY SCREEN: CPT | Mod: HCNC | Performed by: EMERGENCY MEDICINE

## 2024-10-30 PROCEDURE — 85730 THROMBOPLASTIN TIME PARTIAL: CPT | Mod: HCNC | Performed by: EMERGENCY MEDICINE

## 2024-10-30 PROCEDURE — 99285 EMERGENCY DEPT VISIT HI MDM: CPT | Mod: 25,HCNC

## 2024-10-30 PROCEDURE — 84484 ASSAY OF TROPONIN QUANT: CPT | Mod: HCNC | Performed by: EMERGENCY MEDICINE

## 2024-10-30 PROCEDURE — G0378 HOSPITAL OBSERVATION PER HR: HCPCS | Mod: HCNC

## 2024-10-30 PROCEDURE — 93005 ELECTROCARDIOGRAM TRACING: CPT | Mod: HCNC

## 2024-10-30 PROCEDURE — 85025 COMPLETE CBC W/AUTO DIFF WBC: CPT | Mod: HCNC | Performed by: EMERGENCY MEDICINE

## 2024-10-30 RX ORDER — TALC
6 POWDER (GRAM) TOPICAL NIGHTLY PRN
Status: DISCONTINUED | OUTPATIENT
Start: 2024-10-31 | End: 2024-11-05 | Stop reason: HOSPADM

## 2024-10-30 RX ORDER — GLUCAGON 1 MG
1 KIT INJECTION
Status: DISCONTINUED | OUTPATIENT
Start: 2024-10-30 | End: 2024-10-30

## 2024-10-30 RX ORDER — INSULIN GLARGINE 100 [IU]/ML
6 INJECTION, SOLUTION SUBCUTANEOUS NIGHTLY
Status: DISCONTINUED | OUTPATIENT
Start: 2024-10-31 | End: 2024-10-31

## 2024-10-30 RX ORDER — FLUOXETINE HYDROCHLORIDE 20 MG/1
40 CAPSULE ORAL DAILY
Status: DISCONTINUED | OUTPATIENT
Start: 2024-10-31 | End: 2024-11-05 | Stop reason: HOSPADM

## 2024-10-30 RX ORDER — INSULIN ASPART 100 [IU]/ML
0-5 INJECTION, SOLUTION INTRAVENOUS; SUBCUTANEOUS EVERY 6 HOURS PRN
Status: DISCONTINUED | OUTPATIENT
Start: 2024-10-31 | End: 2024-10-31

## 2024-10-30 RX ORDER — GLUCAGON 1 MG
1 KIT INJECTION
Status: DISCONTINUED | OUTPATIENT
Start: 2024-10-30 | End: 2024-10-31

## 2024-10-30 RX ORDER — HYDRALAZINE HYDROCHLORIDE 25 MG/1
100 TABLET, FILM COATED ORAL EVERY 8 HOURS
Status: DISCONTINUED | OUTPATIENT
Start: 2024-10-31 | End: 2024-11-01

## 2024-10-30 RX ORDER — LORAZEPAM 0.5 MG/1
1 TABLET ORAL EVERY 8 HOURS PRN
Status: DISCONTINUED | OUTPATIENT
Start: 2024-10-30 | End: 2024-11-01

## 2024-10-30 RX ORDER — SODIUM CHLORIDE 0.9 % (FLUSH) 0.9 %
10 SYRINGE (ML) INJECTION EVERY 12 HOURS PRN
Status: DISCONTINUED | OUTPATIENT
Start: 2024-10-30 | End: 2024-11-05 | Stop reason: HOSPADM

## 2024-10-30 RX ORDER — QUETIAPINE FUMARATE 25 MG/1
50 TABLET, FILM COATED ORAL NIGHTLY PRN
Status: DISCONTINUED | OUTPATIENT
Start: 2024-10-30 | End: 2024-10-30

## 2024-10-30 RX ORDER — ISOSORBIDE MONONITRATE 60 MG/1
60 TABLET, EXTENDED RELEASE ORAL DAILY
Status: DISCONTINUED | OUTPATIENT
Start: 2024-10-31 | End: 2024-11-05 | Stop reason: HOSPADM

## 2024-10-30 RX ORDER — IBUPROFEN 200 MG
24 TABLET ORAL
Status: DISCONTINUED | OUTPATIENT
Start: 2024-10-30 | End: 2024-11-05 | Stop reason: HOSPADM

## 2024-10-30 RX ORDER — IPRATROPIUM BROMIDE AND ALBUTEROL SULFATE 2.5; .5 MG/3ML; MG/3ML
3 SOLUTION RESPIRATORY (INHALATION) EVERY 4 HOURS PRN
Status: DISCONTINUED | OUTPATIENT
Start: 2024-10-30 | End: 2024-11-05 | Stop reason: HOSPADM

## 2024-10-30 RX ORDER — METOPROLOL SUCCINATE 25 MG/1
25 TABLET, EXTENDED RELEASE ORAL DAILY
Status: DISCONTINUED | OUTPATIENT
Start: 2024-10-31 | End: 2024-11-05 | Stop reason: HOSPADM

## 2024-10-30 RX ORDER — NALOXONE HCL 0.4 MG/ML
0.02 VIAL (ML) INJECTION
Status: DISCONTINUED | OUTPATIENT
Start: 2024-10-30 | End: 2024-11-05 | Stop reason: HOSPADM

## 2024-10-30 RX ORDER — IBUPROFEN 200 MG
16 TABLET ORAL
Status: DISCONTINUED | OUTPATIENT
Start: 2024-10-30 | End: 2024-11-05 | Stop reason: HOSPADM

## 2024-10-30 RX ORDER — GLUCAGON 1 MG
1 KIT INJECTION
Status: DISCONTINUED | OUTPATIENT
Start: 2024-10-31 | End: 2024-10-30

## 2024-10-30 RX ORDER — METHOCARBAMOL 500 MG/1
500 TABLET, FILM COATED ORAL 4 TIMES DAILY PRN
Status: DISCONTINUED | OUTPATIENT
Start: 2024-10-31 | End: 2024-11-05 | Stop reason: HOSPADM

## 2024-10-30 RX ORDER — OXYCODONE HYDROCHLORIDE 5 MG/1
5 TABLET ORAL EVERY 8 HOURS PRN
Status: DISCONTINUED | OUTPATIENT
Start: 2024-10-30 | End: 2024-11-01

## 2024-10-31 LAB
ALBUMIN SERPL BCP-MCNC: 2.4 G/DL (ref 3.5–5.2)
ALP SERPL-CCNC: 173 U/L (ref 40–150)
ALT SERPL W/O P-5'-P-CCNC: 32 U/L (ref 10–44)
ANION GAP SERPL CALC-SCNC: 10 MMOL/L (ref 8–16)
ASCENDING AORTA: 3.05 CM
AST SERPL-CCNC: 43 U/L (ref 10–40)
AV AREA BY CONTINUOUS VTI: 1.1 CM2
AV INDEX (PROSTH): 0.37
AV LVOT MEAN GRADIENT: 2 MMHG
AV LVOT PEAK GRADIENT: 3 MMHG
AV MEAN GRADIENT: 20.1 MMHG
AV PEAK GRADIENT: 38.4 MMHG
AV VALVE AREA BY VELOCITY RATIO: 0.9 CM²
AV VALVE AREA: 1.1 CM2
AV VELOCITY RATIO: 0.29
BASOPHILS # BLD AUTO: 0.09 K/UL (ref 0–0.2)
BASOPHILS NFR BLD: 0.9 % (ref 0–1.9)
BILIRUB SERPL-MCNC: 0.3 MG/DL (ref 0.1–1)
BSA FOR ECHO PROCEDURE: 2.03 M2
BUN SERPL-MCNC: 71 MG/DL (ref 8–23)
C DIFF GDH STL QL: NEGATIVE
C DIFF TOX A+B STL QL IA: NEGATIVE
CALCIUM SERPL-MCNC: 9 MG/DL (ref 8.7–10.5)
CHLORIDE SERPL-SCNC: 100 MMOL/L (ref 95–110)
CO2 SERPL-SCNC: 26 MMOL/L (ref 23–29)
CREAT SERPL-MCNC: 2.8 MG/DL (ref 0.5–1.4)
CV ECHO LV RWT: 0.48 CM
DIFFERENTIAL METHOD BLD: ABNORMAL
DOP CALC AO PEAK VEL: 3.1 M/S
DOP CALC AO VTI: 60.1 CM
DOP CALC LVOT AREA: 3.1 CM2
DOP CALC LVOT DIAMETER: 2 CM
DOP CALC LVOT PEAK VEL: 0.9 M/S
DOP CALC LVOT STROKE VOLUME: 69.1 CM3
DOP CALCLVOT PEAK VEL VTI: 22 CM
E WAVE DECELERATION TIME: 324.24 MS
E/A RATIO: 0.83
E/E' RATIO: 21.45 M/S
ECHO EF ESTIMATED: 64 %
ECHO LV POSTERIOR WALL: 1 CM (ref 0.6–1.1)
EOSINOPHIL # BLD AUTO: 0.3 K/UL (ref 0–0.5)
EOSINOPHIL NFR BLD: 2.4 % (ref 0–8)
ERYTHROCYTE [DISTWIDTH] IN BLOOD BY AUTOMATED COUNT: 14.7 % (ref 11.5–14.5)
EST. GFR  (NO RACE VARIABLE): 17.2 ML/MIN/1.73 M^2
FRACTIONAL SHORTENING: 33.3 % (ref 28–44)
GLUCOSE SERPL-MCNC: 169 MG/DL (ref 70–110)
HCT VFR BLD AUTO: 27.1 % (ref 37–48.5)
HGB BLD-MCNC: 8.6 G/DL (ref 12–16)
IMM GRANULOCYTES # BLD AUTO: 0.07 K/UL (ref 0–0.04)
IMM GRANULOCYTES NFR BLD AUTO: 0.7 % (ref 0–0.5)
INTERVENTRICULAR SEPTUM: 1 CM (ref 0.6–1.1)
LA MAJOR: 6.18 CM
LA MINOR: 5.98 CM
LA WIDTH: 4.97 CM
LDH SERPL L TO P-CCNC: 142 U/L (ref 110–260)
LEFT ATRIUM SIZE: 4.45 CM
LEFT ATRIUM VOLUME INDEX MOD: 57.7 ML/M2
LEFT ATRIUM VOLUME INDEX: 57.1 ML/M2
LEFT ATRIUM VOLUME MOD: 115.31 ML
LEFT ATRIUM VOLUME: 114.27 CM3
LEFT INTERNAL DIMENSION IN SYSTOLE: 2.8 CM (ref 2.1–4)
LEFT VENTRICLE DIASTOLIC VOLUME INDEX: 40.28 ML/M2
LEFT VENTRICLE DIASTOLIC VOLUME: 80.56 ML
LEFT VENTRICLE MASS INDEX: 68.6 G/M2
LEFT VENTRICLE SYSTOLIC VOLUME INDEX: 14.6 ML/M2
LEFT VENTRICLE SYSTOLIC VOLUME: 29.18 ML
LEFT VENTRICULAR INTERNAL DIMENSION IN DIASTOLE: 4.2 CM (ref 3.5–6)
LEFT VENTRICULAR MASS: 137.2 G
LV LATERAL E/E' RATIO: 19.67
LV SEPTAL E/E' RATIO: 23.6
LYMPHOCYTES # BLD AUTO: 2.1 K/UL (ref 1–4.8)
LYMPHOCYTES NFR BLD: 19.9 % (ref 18–48)
MAGNESIUM SERPL-MCNC: 1.9 MG/DL (ref 1.6–2.6)
MCH RBC QN AUTO: 27.8 PG (ref 27–31)
MCHC RBC AUTO-ENTMCNC: 31.7 G/DL (ref 32–36)
MCV RBC AUTO: 88 FL (ref 82–98)
MONOCYTES # BLD AUTO: 1.1 K/UL (ref 0.3–1)
MONOCYTES NFR BLD: 10.6 % (ref 4–15)
MV PEAK A VEL: 1.43 M/S
MV PEAK E VEL: 1.18 M/S
MV PEAK GRADIENT: 6 MMHG
NEUTROPHILS # BLD AUTO: 6.8 K/UL (ref 1.8–7.7)
NEUTROPHILS NFR BLD: 65.5 % (ref 38–73)
NRBC BLD-RTO: 0 /100 WBC
OHS CV RV/LV RATIO: 0.9 CM
OHS QRS DURATION: 82 MS
OHS QRS DURATION: 82 MS
OHS QTC CALCULATION: 502 MS
OHS QTC CALCULATION: 511 MS
PHOSPHATE SERPL-MCNC: 5 MG/DL (ref 2.7–4.5)
PISA TR MAX VEL: 3.26 M/S
PLATELET # BLD AUTO: 229 K/UL (ref 150–450)
PMV BLD AUTO: 13.9 FL (ref 9.2–12.9)
POTASSIUM SERPL-SCNC: 4.2 MMOL/L (ref 3.5–5.1)
PROT SERPL-MCNC: 6.2 G/DL (ref 6–8.4)
RA MAJOR: 4.49 CM
RA PRESSURE ESTIMATED: 3 MMHG
RA WIDTH: 2.49 CM
RBC # BLD AUTO: 3.09 M/UL (ref 4–5.4)
RIGHT VENTRICLE DIASTOLIC BASEL DIMENSION: 3.8 CM
RV TB RVSP: 6 MMHG
SINUS: 2.67 CM
SODIUM SERPL-SCNC: 136 MMOL/L (ref 136–145)
STJ: 2.41 CM
TDI LATERAL: 0.06 M/S
TDI SEPTAL: 0.05 M/S
TDI: 0.06 M/S
TR MAX PG: 43 MMHG
TRICUSPID ANNULAR PLANE SYSTOLIC EXCURSION: 2.03 CM
TROPONIN I SERPL DL<=0.01 NG/ML-MCNC: 0.08 NG/ML (ref 0–0.03)
TROPONIN I SERPL DL<=0.01 NG/ML-MCNC: 0.09 NG/ML (ref 0–0.03)
TROPONIN I SERPL DL<=0.01 NG/ML-MCNC: 0.1 NG/ML (ref 0–0.03)
TV PEAK GRADIENT: 42 MMHG
TV REST PULMONARY ARTERY PRESSURE: 46 MMHG
WBC # BLD AUTO: 10.31 K/UL (ref 3.9–12.7)
Z-SCORE OF LEFT VENTRICULAR DIMENSION IN END DIASTOLE: -3.28
Z-SCORE OF LEFT VENTRICULAR DIMENSION IN END SYSTOLE: -1.94

## 2024-10-31 PROCEDURE — 99223 1ST HOSP IP/OBS HIGH 75: CPT | Mod: HCNC,GC,, | Performed by: STUDENT IN AN ORGANIZED HEALTH CARE EDUCATION/TRAINING PROGRAM

## 2024-10-31 PROCEDURE — 84484 ASSAY OF TROPONIN QUANT: CPT | Mod: 91,HCNC

## 2024-10-31 PROCEDURE — 80053 COMPREHEN METABOLIC PANEL: CPT | Mod: HCNC

## 2024-10-31 PROCEDURE — 93005 ELECTROCARDIOGRAM TRACING: CPT | Mod: HCNC

## 2024-10-31 PROCEDURE — 36415 COLL VENOUS BLD VENIPUNCTURE: CPT | Mod: HCNC

## 2024-10-31 PROCEDURE — 25000003 PHARM REV CODE 250: Mod: HCNC

## 2024-10-31 PROCEDURE — 83615 LACTATE (LD) (LDH) ENZYME: CPT | Mod: HCNC

## 2024-10-31 PROCEDURE — 85025 COMPLETE CBC W/AUTO DIFF WBC: CPT | Mod: HCNC

## 2024-10-31 PROCEDURE — 84100 ASSAY OF PHOSPHORUS: CPT | Mod: HCNC

## 2024-10-31 PROCEDURE — 93010 ELECTROCARDIOGRAM REPORT: CPT | Mod: HCNC,,, | Performed by: INTERNAL MEDICINE

## 2024-10-31 PROCEDURE — 83735 ASSAY OF MAGNESIUM: CPT | Mod: HCNC

## 2024-10-31 PROCEDURE — 27000207 HC ISOLATION: Mod: HCNC

## 2024-10-31 PROCEDURE — 87449 NOS EACH ORGANISM AG IA: CPT | Mod: HCNC

## 2024-10-31 PROCEDURE — 21400001 HC TELEMETRY ROOM: Mod: HCNC

## 2024-10-31 PROCEDURE — 25000003 PHARM REV CODE 250: Mod: HCNC | Performed by: STUDENT IN AN ORGANIZED HEALTH CARE EDUCATION/TRAINING PROGRAM

## 2024-10-31 RX ORDER — SODIUM CHLORIDE 9 MG/ML
INJECTION, SOLUTION INTRAVENOUS CONTINUOUS
Status: DISCONTINUED | OUTPATIENT
Start: 2024-10-31 | End: 2024-10-31

## 2024-10-31 RX ORDER — DOXYCYCLINE HYCLATE 100 MG
100 TABLET ORAL EVERY 12 HOURS
Status: DISCONTINUED | OUTPATIENT
Start: 2024-10-31 | End: 2024-11-05 | Stop reason: HOSPADM

## 2024-10-31 RX ORDER — SODIUM CHLORIDE 9 MG/ML
INJECTION, SOLUTION INTRAVENOUS CONTINUOUS
Status: ACTIVE | OUTPATIENT
Start: 2024-10-31 | End: 2024-10-31

## 2024-10-31 RX ADMIN — VANCOMYCIN HYDROCHLORIDE 125 MG: KIT at 09:10

## 2024-10-31 RX ADMIN — Medication 6 MG: at 12:10

## 2024-10-31 RX ADMIN — DOXYCYCLINE HYCLATE 100 MG: 100 TABLET, COATED ORAL at 08:10

## 2024-10-31 RX ADMIN — OXYCODONE 5 MG: 5 TABLET ORAL at 09:10

## 2024-10-31 RX ADMIN — DOXYCYCLINE HYCLATE 100 MG: 100 TABLET, COATED ORAL at 09:10

## 2024-10-31 RX ADMIN — SODIUM CHLORIDE: 9 INJECTION, SOLUTION INTRAVENOUS at 09:10

## 2024-10-31 RX ADMIN — ISOSORBIDE MONONITRATE 60 MG: 60 TABLET, EXTENDED RELEASE ORAL at 09:10

## 2024-10-31 RX ADMIN — FIDAXOMICIN 200 MG: 200 TABLET, FILM COATED ORAL at 08:10

## 2024-10-31 RX ADMIN — METOPROLOL SUCCINATE 25 MG: 25 TABLET, EXTENDED RELEASE ORAL at 10:10

## 2024-10-31 RX ADMIN — OXYCODONE 5 MG: 5 TABLET ORAL at 12:10

## 2024-10-31 RX ADMIN — FLUOXETINE HYDROCHLORIDE 40 MG: 20 CAPSULE ORAL at 09:10

## 2024-10-31 RX ADMIN — HYDRALAZINE HYDROCHLORIDE 100 MG: 25 TABLET ORAL at 09:10

## 2024-10-31 RX ADMIN — SODIUM CHLORIDE: 9 INJECTION, SOLUTION INTRAVENOUS at 02:10

## 2024-10-31 RX ADMIN — SODIUM CHLORIDE: 9 INJECTION, SOLUTION INTRAVENOUS at 12:10

## 2024-10-31 RX ADMIN — LORAZEPAM 1 MG: 0.5 TABLET ORAL at 12:10

## 2024-10-31 NOTE — HPI
Lorena Contreras is a 74-year-old woman with Fibromyalgia, lymphoma s/p chemo, HTN, HLD, DMT2, CKD3b, stroke, MI / CAD s/p PCI, and HFpEF who was admitted for hematochezia of large volume with blood clots. ID is consulted for antibiotic recommendations in the setting of persistent C dif.    She was seen for ankle fracture, on discharge from SNF her ankle wound dehisced requiring surgical debridement and a 6-week course of doxycycline. At time of that admission she was found to have C. diff colitis. She was discharged on PO vancomycin until completion of doxycyline course. Her diarrhea is still occurring every 2-3 hours and 3 days ago she developed nausea and vomiting. Her outpatient ID physician advised her to taper her PO vancomycin from q6h to BID. The day PTA, her daughter noticed a darkening of her stools and today she was noted to have a pad filled with bright red blood and clots. While in the ED she developed abdominal pain localized to RLQ. No further bowel movements since and has remained NPO. She notes chills, anxiety, ankle pain, lightheadedness and RLQ pain but denies any fevers, changes in vision, chest pains, vomiting, or extremity swelling. Last dose of ASA and brilinta was this morning.      In the ED she was hemodynamically stable with a hgb of 8.8 near her baseline. BUN 67 and Cr 2.7 with baseline of 1.5. AST 51, ALT 37, ALKP 181 all at baseline. Troponin mildly elevated at 0.071. EKG with no acute changes.

## 2024-10-31 NOTE — SUBJECTIVE & OBJECTIVE
Past Medical History:   Diagnosis Date    Allergy     Altered mental status 06/19/2022    DYSARTHRIA, SPASTIC MOVEMENTS & DIFFICULTY SWALLOWING    Anemia     Anxiety     Arthritis     Cataract     both removed    Colon polyps     Coronary artery disease     Depression     Diabetes mellitus, type II     Disorder of kidney and ureter     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/2023    Fibromyalgia     Follicular lymphoma     GERD (gastroesophageal reflux disease)     HTN (hypertension)     Hyperlipidemia     MI (myocardial infarction) 03/2019    Personal history of colonic polyps     Restless leg syndrome     Stroke        Past Surgical History:   Procedure Laterality Date    APPLICATION OF WOUND VACUUM-ASSISTED CLOSURE DEVICE Right 9/25/2024    Procedure: APPLICATION, WOUND VAC;  Surgeon: Neto Davalos MD;  Location: [facility] OR [address] [place] [address];  Service: Orthopedics;  Laterality: Right;    COLONOSCOPY  11/07/2012    Colon polyp found; repeat in 5 years    DEBRIDEMENT OF LOCAL FLAP Right 9/25/2024    Procedure: DEBRIDEMENT, LOCAL FLAP;  Surgeon: Neto Davalos MD;  Location: [facility] OR [address] [place] [address];  Service: Orthopedics;  Laterality: Right;    ELBOW SURGERY Right 2015    dislocation repair     ESOPHAGOGASTRODUODENOSCOPY  11/07/2012    atrophic gastritis, H pylori testing negative    INCISION AND DRAINAGE FOOT Right 6/2/2021    Procedure: INCISION AND DRAINAGE, FOOT, bone biopsy;  Surgeon: Quiana Penn DPM;  Location: [facility] OR;  Service: Podiatry;  Laterality: Right;    IRRIGATION AND DEBRIDEMENT OF LOWER EXTREMITY Right 9/25/2024    Procedure: IRRIGATION AND DEBRIDEMENT, LOWER EXTREMITY; slider table, supine, bone foam, cysto tubing, 6L NS/dakins/peroxide, culture swabs;  Surgeon: Neto Davalos MD;  Location: [facility] OR [address] [place] [address];  Service: Orthopedics;  Laterality: Right;    KNEE SURGERY Bilateral 2015    scoped    LEFT HEART CATHETERIZATION Left 3/29/2019    Procedure: Left heart cath;  Surgeon: Bladimir WHITING  MD Chelsey;  Location: [facility] CATH LAB;  Service: Cardiology;  Laterality: Left;    LEFT HEART CATHETERIZATION Left 11/18/2019    Procedure: Left heart cath;  Surgeon: Karl Rico MD;  Location: Health system CATH LAB;  Service: Cardiology;  Laterality: Left;    LEFT HEART CATHETERIZATION Left 1/8/2020    Procedure: Left heart cath, right radial, noon start;  Surgeon: Christos Monreal MD;  Location: Health system CATH LAB;  Service: Cardiology;  Laterality: Left;  RN Pre Op 1-6-20.  To be admitted 1-7-20 sor Aspirin Disensitation    OPEN REDUCTION AND INTERNAL FIXATION (ORIF) OF FRACTURE OF ACETABULUM Right 8/16/2024    Procedure: ORIF, FRACTURE, ACETABULUM;  Surgeon: Neto Davalos MD;  Location: [address];  Service: Orthopedics;  Laterality: Right;  anterior and lateral pelvic incisions    OPEN REDUCTION AND INTERNAL FIXATION (ORIF) OF PILON FRACTURE Right 8/14/2024    Procedure: ORIF, FRACTURE, PILON;  Surgeon: Neto Davalos MD;  Location: [facility] OR [address] [place] [address];  Service: Orthopedics;  Laterality: Right;    TONSILLECTOMY  1955    ULTRASOUND GUIDANCE  1/8/2020    Procedure: Ultrasound Guidance;  Surgeon: Christos Monreal MD;  Location: Health system CATH LAB;  Service: Cardiology;;       Review of patient's allergies indicates:   Allergen Reactions    Novolin 70/30 (semi-synthetic) Nausea And Vomiting     Severe vomiting on Relion 70/30    Sulfa (sulfonamide antibiotics) Anaphylaxis    Talwin [pentazocine lactate] Anaphylaxis    Victoza [liraglutide] Nausea And Vomiting    Glipizide Nausea Only    Citric acid     Codeine     Influenza virus vaccines Hives    Iodine and iodide containing products Hives    Levetiracetam Itching    Neurontin [gabapentin]      Possible associated myoclonic jerk    Rituxan [rituximab] Hives    Zoloft [sertraline] Nausea And Vomiting     Family History       Problem Relation (Age of Onset)    Allergy (severe) Daughter    Cancer Mother, Father    Diabetes Sister    Heart disease Mother     Hypertension Sister    Lung cancer Brother    No Known Problems Daughter          Tobacco Use    Smoking status: Never    Smokeless tobacco: Never   Substance and Sexual Activity    Alcohol use: Not Currently    Drug use: Never    Sexual activity: Not Currently     Partners: Male     Review of Systems   Constitutional:  Positive for chills. Negative for fever.   HENT:  Negative for congestion.    Eyes:  Negative for visual disturbance.   Respiratory:  Negative for cough, chest tightness and shortness of breath.    Cardiovascular:  Positive for leg swelling. Negative for chest pain and palpitations.   Gastrointestinal:  Positive for abdominal pain (RLQ), blood in stool, diarrhea and nausea. Negative for constipation and rectal pain.   Genitourinary:  Negative for dysuria.   Musculoskeletal:  Positive for arthralgias (R ankle). Negative for back pain.   Skin:  Positive for wound (R ankle).   Neurological:  Positive for dizziness (when sitting up in bed in morning for past few weeks), tremors and weakness.   Psychiatric/Behavioral:  Negative for confusion. The patient is nervous/anxious.         Distraught over current health conditions     Objective:     Vital Signs (Most Recent):  Temp: 98.1 °F (36.7 °C) (10/31/24 0456)  Pulse: 78 (10/31/24 0456)  Resp: 18 (10/31/24 0456)  BP: (!) 116/59 (10/31/24 0600)  SpO2: 95 % (10/31/24 0456) Vital Signs (24h Range):  Temp:  [98.1 °F (36.7 °C)] 98.1 °F (36.7 °C)  Pulse:  [78-96] 78  Resp:  [18-20] 18  SpO2:  [95 %-100 %] 95 %  BP: ()/(50-64) 116/59     Weight: 86.2 kg (190 lb) (10/31/24 0456)  Body mass index is 28.89 kg/m².      Intake/Output Summary (Last 24 hours) at 10/31/2024 0817  Last data filed at 10/31/2024 0525  Gross per 24 hour   Intake --   Output 0 ml   Net 0 ml       Lines/Drains/Airways       Peripheral Intravenous Line  Duration                  Peripheral IV - Single Lumen 10/30/24 2021 20 G Posterior;Right Forearm <1 day         Peripheral IV - Single  "Lumen 10/30/24 2021 22 G Anterior;Left Forearm <1 day                     Physical Exam  Constitutional:       General: She is in acute distress.      Appearance: She is ill-appearing.   Eyes:      General: No scleral icterus.     Extraocular Movements: Extraocular movements intact.      Conjunctiva/sclera: Conjunctivae normal.   Cardiovascular:      Rate and Rhythm: Normal rate and regular rhythm.      Pulses: Normal pulses.      Heart sounds: Murmur (systolic) heard.   Pulmonary:      Effort: Pulmonary effort is normal. No respiratory distress.      Breath sounds: Normal breath sounds.   Chest:      Chest wall: No tenderness.   Abdominal:      General: There is no distension.      Palpations: Abdomen is soft.      Tenderness: There is abdominal tenderness (RLQ). There is guarding. There is no rebound.   Genitourinary:     Comments: No blood on pad  Musculoskeletal:         General: Tenderness present.      Right lower leg: Edema present.      Left lower leg: No edema.      Comments: R ankle wrapped and in brace   Skin:     General: Skin is warm and dry.      Capillary Refill: Capillary refill takes less than 2 seconds.      Coloration: Skin is not jaundiced or pale.   Neurological:      General: No focal deficit present.      Mental Status: She is alert and oriented to person, place, and time.   Psychiatric:      Comments: anxious          Significant Labs:  CBC:   Recent Labs   Lab 10/30/24  1946 10/31/24  0218   WBC 10.99 10.31   HGB 8.8* 8.6*   HCT 27.5* 27.1*    229     CMP:   Recent Labs   Lab 10/31/24  0218   *   CALCIUM 9.0   ALBUMIN 2.4*   PROT 6.2      K 4.2   CO2 26      BUN 71*   CREATININE 2.8*   ALKPHOS 173*   ALT 32   AST 43*   BILITOT 0.3     Stool C. diff: No results for input(s): "CDIFFICILEAN", "CDIFFTOX" in the last 48 hours.  Stool Culture: No results for input(s): "STOOLCULTURE" in the last 48 hours.  Stool Ova/Cysts/Parasites: No results for input(s): "STLEXAMOCP" in " "the last 48 hours.  Stool Giardia/Crypto: No results for input(s): "GIARDIAANTIG", "CRSPAG" in the last 48 hours.  Stool WBCs: No results for input(s): "STOOLWBC" in the last 48 hours.    Significant Imaging:  Imaging results within the past 24 hours have been reviewed.  "

## 2024-10-31 NOTE — ACP (ADVANCE CARE PLANNING)
Advance Care Planning     Date: 10/30/2024    Code Status  In light of the patients advanced and life limiting illness,I engaged the the patient in a voluntary conversation about the patient's preferences for care  at the very end of life. The patient wishes to have a natural, peaceful death.  Along those lines, the patient does not wish to have CPR or other  when her heart stops. However, she does wish to be intubated should she develop emergency respiratory distress. I communicated to the patient and family that a partial code would be entered into the chart expressing wishes for intubation without chest compressions.      A total of 30 min was spent on advance care planning, goals of care discussion, emotional support, formulating and communicating prognosis and exploring burden/benefit of various approaches of treatment. This discussion occurred on a fully voluntary basis with the verbal consent of the patient and/or family.        Ayesha Buck MD

## 2024-10-31 NOTE — ASSESSMENT & PLAN NOTE
Troponin on admission 0.071. EKG only notable for prior infarct. No chest pain or SOB. Likely demand ischemia.    Plan:  - trend trops to peak  - if develops chest pain - stat EKG, repeat trop

## 2024-10-31 NOTE — HPI
Lorena Contreras is a 74y female with a pmhx of CKD, CAD (MI 2019), HTN, fibromyalgia, and lymphoma (s/p chemo, in remission) who presented today with large volume bright red blood with clots in her stool. In August she fell injuring her right hip and ankle which required surgical fixation. On discharge from SNF her ankle wound dehisced requiring surgical debridement. At time of that admission she was found to have c. Diff colitis. She was discharged on a 6 week course of doxycycline and PO vancomycin until completion of doxycyline course. Her diarrhea is still occurring every 2-3 hours and 3 days ago she developed nausea and vomiting. Her outpatient ID physician advised her to taper her PO vancomycin from q6h to BID. Yesterday her daughter noticed a darkening of her stools and today she was noted to have a pad filled with bright red blood and clots. While in the ED she developed abdominal pain localized to RLQ. No further bowel movements since and has remained NPO. She notes chills, anxiety, ankle pain, and new abdominal pain but denies any fevers, current dizziness, changes in vision, chest pains, vomiting, or extremity swelling. She reports feeling dizzy when arising in the morning for the past few weeks. Last dose of ASA and brilinta was this morning.     In the ED she was hemodynamically stable with a hgb of 8.8 near her baseline. BUN 67 and Cr 2.7 with baseline of 1.5. AST 51, ALT 37, ALKP 181 all at baseline. Troponin mildly elevated at 0.071. EKG with no acute changes.

## 2024-10-31 NOTE — ASSESSMENT & PLAN NOTE
#hematochezia    Patient recently dx with C. Diff colitis. Has been on PO vanc (scheduled to finish 11/7), as well as a 6 week course of doxycycline for a surgical ankle infection. She recently tapered the vanc from q6h to BID on 10/27 per outpatient ID. Despite adherence with antibiotic regimen, patient still endorses watery Bms every 2-3hrs.     Has been experiencing hematochezia since 10/29. Hb= 8.6. SAKSHI with dark red blood present. Abdominal XR without  c/f obstructive bowel gas pattern. On aspirin and brilinta. Last EGD/ colonoscopy in 2012, revealing atrophic gastritis as well as a benign colon polyp. Currently still has bloody bowel movements.    PLAN:  - In view of the infective process still ongoing, patient is not a good colonoscopy candidate at this time.  - Will recommend continued Abx treatment, and follow-up on ID recs.  - Trend Hgb and transfuse for goal Hgb > 7, unless otherwise indicated  - Hold all NSAIDs and anticoagulants, unless contraindicated  - Continue to monitor for additional episodes of hematochezia/melena  - Please notify GI team if there is significant change in patient's clinical status;  Will sign off

## 2024-10-31 NOTE — PLAN OF CARE
10/31/24 1116   Readmission   Was this a planned readmission? No   Why were you hospitalized in the last 30 days? accident   Why were you readmitted? Alarmed about signs/symptoms   When you left the hospital how did you feel? a little better   When you left the hospital where did you go? SNF   Did patient/caregiver refused recommended DC plan? Yes   Tell me about what happened between when you left the hospital and the day you returned. Sent home, foot busted, had to go back to hospital, another surgery, then got C diff.     ANAID Cardenas, BS, RN, CCM      Discharge Plan A and Plan B have been determined by review of patient's clinical status, future medical and therapeutic needs, and coverage/benefits for post-acute care in coordination with multidisciplinary team members.

## 2024-10-31 NOTE — SUBJECTIVE & OBJECTIVE
Past Medical History:   Diagnosis Date    Allergy     Altered mental status 06/19/2022    DYSARTHRIA, SPASTIC MOVEMENTS & DIFFICULTY SWALLOWING    Anemia     Anxiety     Arthritis     Cataract     both removed    Colon polyps     Coronary artery disease     Depression     Diabetes mellitus, type II     Disorder of kidney and ureter     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/2023    Fibromyalgia     Follicular lymphoma     GERD (gastroesophageal reflux disease)     HTN (hypertension)     Hyperlipidemia     MI (myocardial infarction) 03/2019    Personal history of colonic polyps     Restless leg syndrome     Stroke        Past Surgical History:   Procedure Laterality Date    APPLICATION OF WOUND VACUUM-ASSISTED CLOSURE DEVICE Right 9/25/2024    Procedure: APPLICATION, WOUND VAC;  Surgeon: Neto Davalos MD;  Location: [facility] OR [address] [place] [address];  Service: Orthopedics;  Laterality: Right;    COLONOSCOPY  11/07/2012    Colon polyp found; repeat in 5 years    DEBRIDEMENT OF LOCAL FLAP Right 9/25/2024    Procedure: DEBRIDEMENT, LOCAL FLAP;  Surgeon: Neto Davalos MD;  Location: [facility] OR [address] [place] [address];  Service: Orthopedics;  Laterality: Right;    ELBOW SURGERY Right 2015    dislocation repair     ESOPHAGOGASTRODUODENOSCOPY  11/07/2012    atrophic gastritis, H pylori testing negative    INCISION AND DRAINAGE FOOT Right 6/2/2021    Procedure: INCISION AND DRAINAGE, FOOT, bone biopsy;  Surgeon: Quinaa Penn DPM;  Location: [facility] OR;  Service: Podiatry;  Laterality: Right;    IRRIGATION AND DEBRIDEMENT OF LOWER EXTREMITY Right 9/25/2024    Procedure: IRRIGATION AND DEBRIDEMENT, LOWER EXTREMITY; slider table, supine, bone foam, cysto tubing, 6L NS/dakins/peroxide, culture swabs;  Surgeon: Neto Davalos MD;  Location: [facility] OR [address] [place] [address];  Service: Orthopedics;  Laterality: Right;    KNEE SURGERY Bilateral 2015    scoped    LEFT HEART CATHETERIZATION Left 3/29/2019    Procedure: Left heart cath;  Surgeon: Bladimir WHITING  MD Chelsey;  Location: [facility] CATH LAB;  Service: Cardiology;  Laterality: Left;    LEFT HEART CATHETERIZATION Left 11/18/2019    Procedure: Left heart cath;  Surgeon: Karl Rico MD;  Location: [facility] CATH LAB;  Service: Cardiology;  Laterality: Left;    LEFT HEART CATHETERIZATION Left 1/8/2020    Procedure: Left heart cath, right radial, noon start;  Surgeon: Christos Monreal MD;  Location: [facility] CATH LAB;  Service: Cardiology;  Laterality: Left;  RN Pre Op 1-6-20.  To be admitted 1-7-20 sor Aspirin Disensitation    OPEN REDUCTION AND INTERNAL FIXATION (ORIF) OF FRACTURE OF ACETABULUM Right 8/16/2024    Procedure: ORIF, FRACTURE, ACETABULUM;  Surgeon: Neto Davalos MD;  Location: [address];  Service: Orthopedics;  Laterality: Right;  anterior and lateral pelvic incisions    OPEN REDUCTION AND INTERNAL FIXATION (ORIF) OF PILON FRACTURE Right 8/14/2024    Procedure: ORIF, FRACTURE, PILON;  Surgeon: Neto Davalos MD;  Location: [facility] OR [address] [place] [address];  Service: Orthopedics;  Laterality: Right;    TONSILLECTOMY  1955    ULTRASOUND GUIDANCE  1/8/2020    Procedure: Ultrasound Guidance;  Surgeon: Christos Morneal MD;  Location: [facility] CATH LAB;  Service: Cardiology;;       Review of patient's allergies indicates:   Allergen Reactions    Novolin 70/30 (semi-synthetic) Nausea And Vomiting     Severe vomiting on Relion 70/30    Sulfa (sulfonamide antibiotics) Anaphylaxis    Talwin [pentazocine lactate] Anaphylaxis    Victoza [liraglutide] Nausea And Vomiting    Glipizide Nausea Only    Citric acid     Codeine     Influenza virus vaccines Hives    Iodine and iodide containing products Hives    Levetiracetam Itching    Neurontin [gabapentin]      Possible associated myoclonic jerk    Rituxan [rituximab] Hives    Zoloft [sertraline] Nausea And Vomiting       Medications:  Medications Prior to Admission   Medication Sig    aspirin 81 MG Chew Take 1 tablet (81 mg total) by mouth once daily. Hold to avoid bleed  "risk on triple therapy and then resume on oct 27 once eliquis stops on oct 26th    BRILINTA 90 mg tablet Take 1 tablet by mouth twice daily    DEXCOM G7  Misc Use 1  to track blood glucose, ICD10: E11.65    DEXCOM G7 SENSOR Samina Use 1 sensor every 10 days to track blood glucose, ICD10: E11.65, okay with 90 day supply if possible    doxycycline (VIBRA-TABS) 100 MG tablet Take 1 tablet (100 mg total) by mouth every 12 (twelve) hours. End date 11/7/24    FLUoxetine 40 MG capsule Take 1 capsule (40 mg total) by mouth once daily.    furosemide (LASIX) 40 MG tablet Take 1 tablet (40 mg total) by mouth 2 (two) times daily.    hydrALAZINE (APRESOLINE) 100 MG tablet Take 1 tablet (100 mg total) by mouth every 8 (eight) hours.    insulin aspart U-100 (NOVOLOG) 100 unit/mL (3 mL) InPn pen Inject 0-10 Units into the skin before meals and at bedtime as needed (Hyperglycemia).    insulin glargine U-100, Lantus, 100 unit/mL (3 mL) SubQ InPn pen Inject 12 Units into the skin every evening.    isosorbide mononitrate (IMDUR) 60 MG 24 hr tablet Take 1 tablet (60 mg total) by mouth once daily.    LORazepam (ATIVAN) 1 MG tablet TAKE 1 TABLET BY MOUTH ONCE DAILY AS NEEDED FOR ANXIETY    metoprolol succinate (TOPROL-XL) 25 MG 24 hr tablet Take 1 tablet (25 mg total) by mouth once daily.    ondansetron (ZOFRAN-ODT) 8 MG TbDL Dissolve 1 tablet (8 mg total) by mouth every 8 (eight) hours as needed (nausea).    oxyCODONE (ROXICODONE) 5 MG immediate release tablet Take 1 tablet (5 mg total) by mouth every 8 (eight) hours as needed (pain scale 4-6).    pen needle, diabetic (BD ULTRA-FINE SAGAR PEN NEEDLE) 32 gauge x 5/32" Ndle One pen needle use with insulin pen 4 times a day.  ICD-10: E11.9    QUEtiapine (SEROQUEL) 50 MG tablet Take 1 tablet (50 mg total) by mouth nightly as needed (insomnia).    ticagrelor (BRILINTA) 90 mg tablet Take 1 tablet (90 mg total) by mouth 2 (two) times daily.    vancomycin (VANCOCIN) 125 MG capsule " Take 1 capsule by mouth every 6 hours until October 9th then take twice daily until off IV antibiotics (11/7/24) then stop    albuterol (PROVENTIL/VENTOLIN HFA) 90 mcg/actuation inhaler Inhale 2 puffs into the lungs every 6 (six) hours as needed for Wheezing or Shortness of Breath.    aluminum & magnesium hydroxide-simethicone (MYLANTA MAX STRENGTH) 400-400-40 mg/5 mL suspension Take 30 mLs by mouth every 6 (six) hours as needed for Indigestion.    hydrOXYzine HCL (ATARAX) 25 MG tablet Take 1 tablet (25 mg total) by mouth 3 (three) times daily as needed for Itching.    OZEMPIC 1 mg/dose (4 mg/3 mL) Inject 1 mg into the skin every 7 days. HOLD while at [facility]     Antibiotics (From admission, onward)      Start     Stop Route Frequency Ordered    10/31/24 0900  doxycycline tablet 100 mg         -- Oral Every 12 hours 10/31/24 0013    10/31/24 0900  vancomycin 125 mg/5 mL oral solution 125 mg  (C. difficile Infection (CDI) Treatment Order Panel)         11/08/24 0859 Oral Every 12 hours 10/31/24 0013          Antifungals (From admission, onward)      None          Antivirals (From admission, onward)      None             Immunization History   Administered Date(s) Administered    PPD Test 08/15/2024    Pneumococcal Conjugate - 13 Valent 11/28/2016    Pneumococcal Polysaccharide - 23 Valent 02/23/2012, 02/01/2018    Tdap 02/23/2012, 11/27/2012, 05/28/2021       Family History       Problem Relation (Age of Onset)    Allergy (severe) Daughter    Cancer Mother, Father    Diabetes Sister    Heart disease Mother    Hypertension Sister    Lung cancer Brother    No Known Problems Daughter          Social History     Socioeconomic History    Marital status:     Number of children: 2   Occupational History    Occupation: house wife    Occupation: [employer]   Tobacco Use    Smoking status: Never    Smokeless tobacco: Never   Substance and Sexual Activity    Alcohol use: Not Currently    Drug use: Never    Sexual  activity: Not Currently     Partners: Male   Social History Narrative     2021.  2 dtr.  Lives with GS.  3 cats and a dog.  Retired.  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  Lives in house, 1 story and 4 steps up and has a ramp.      Enjoys crafting.  Unable to bowl due to myalgias.       Social Drivers of Health     Financial Resource Strain: Low Risk  (10/31/2024)    Overall Financial Resource Strain (CARDIA)     Difficulty of Paying Living Expenses: Not hard at all   Recent Concern: Financial Resource Strain - Medium Risk (10/31/2024)    Overall Financial Resource Strain (CARDIA)     Difficulty of Paying Living Expenses: Somewhat hard   Food Insecurity: No Food Insecurity (10/31/2024)    Hunger Vital Sign     Worried About Running Out of Food in the Last Year: Never true     Ran Out of Food in the Last Year: Never true   Transportation Needs: No Transportation Needs (10/31/2024)    TRANSPORTATION NEEDS     Transportation : No   Physical Activity: Inactive (10/31/2024)    Exercise Vital Sign     Days of Exercise per Week: 0 days     Minutes of Exercise per Session: 0 min   Stress: Stress Concern Present (10/31/2024)    [place] [employer] - Occupational Stress Questionnaire     Feeling of Stress : Very much   Housing Stability: Low Risk  (10/31/2024)    Housing Stability Vital Sign     Unable to Pay for Housing in the Last Year: No     Homeless in the Last Year: No     Review of Systems   Constitutional:  Negative for chills and fever.   Respiratory:  Negative for shortness of breath.    Cardiovascular:  Negative for chest pain and leg swelling.   Gastrointestinal:  Positive for abdominal pain, blood in stool and nausea. Negative for abdominal distention, constipation and vomiting.   Musculoskeletal:  Positive for arthralgias and gait problem.   Skin:  Negative for rash.   Neurological:  Positive for light-headedness.     Objective:     Vital Signs (Most Recent):  Temp:  97.6 °F (36.4 °C) (10/31/24 1127)  Pulse: 89 (10/31/24 1127)  Resp: 14 (10/31/24 1127)  BP: (!) 101/57 (10/31/24 1127)  SpO2: 96 % (10/31/24 1127) Vital Signs (24h Range):  Temp:  [97.6 °F (36.4 °C)-98.1 °F (36.7 °C)] 97.6 °F (36.4 °C)  Pulse:  [78-96] 89  Resp:  [14-20] 14  SpO2:  [95 %-100 %] 96 %  BP: ()/(50-64) 101/57     Weight: 86 kg (189 lb 9.5 oz)  Body mass index is 28.83 kg/m².    Estimated Creatinine Clearance: 20.2 mL/min (A) (based on SCr of 2.8 mg/dL (H)).     Physical Exam  Vitals and nursing note reviewed.   Constitutional:       General: She is not in acute distress.     Appearance: She is normal weight. She is ill-appearing. She is not toxic-appearing.   HENT:      Head: Normocephalic and atraumatic.   Eyes:      Extraocular Movements: Extraocular movements intact.      Conjunctiva/sclera: Conjunctivae normal.   Cardiovascular:      Rate and Rhythm: Normal rate and regular rhythm.   Pulmonary:      Effort: Pulmonary effort is normal. No respiratory distress.   Abdominal:      General: Abdomen is flat. There is no distension.      Palpations: Abdomen is soft.      Tenderness: There is abdominal tenderness. There is no guarding.   Musculoskeletal:      Cervical back: Normal range of motion and neck supple.      Right lower leg: No edema.      Left lower leg: No edema.   Skin:     General: Skin is warm and dry.   Neurological:      Mental Status: She is alert.      GCS: GCS eye subscore is 4. GCS verbal subscore is 5. GCS motor subscore is 6.      Motor: Weakness present.   Psychiatric:         Mood and Affect: Mood normal.         Behavior: Behavior normal.         Thought Content: Thought content normal.          Significant Labs: All pertinent labs within the past 24 hours have been reviewed.    Significant Imaging: I have reviewed all pertinent imaging results/findings within the past 24 hours.

## 2024-10-31 NOTE — ASSESSMENT & PLAN NOTE
Creatine stable for now. BMP reviewed- noted Estimated Creatinine Clearance: 21.8 mL/min (A) (based on SCr of 2.7 mg/dL (H)). according to latest data. Based on current GFR, CKD stage is stage 3 - GFR 30-59 with acute KYA.      Plan:  - Monitor UOP and serial BMP and adjust therapy as needed.   - Renally dose meds.   - Avoid nephrotoxic medications and procedures.  - hold lasix

## 2024-10-31 NOTE — ASSESSMENT & PLAN NOTE
Last A1c 8.2 in July. On 12U lantus nightly with SSI at meals.     Plan:  - hold lantus while relatively euglycemic and npo  - low dose SSI

## 2024-10-31 NOTE — ED PROVIDER NOTES
Emergency Department Provider Note    Lorena Contreras   74 y.o. female   [id number]      10/30/2024       History     This history was obtained from the patient and her daughter who is at the bedside without limitations.  She presented by ambulance from the nursing home where she is currently receiving inpatient rehabilitation services.      She is a 74-year-old with the below past medical history.  She had a recent fall that resulted in fractures to one of her hips as well as her right ankle.  She had postoperative complications with the ankle and was started on doxycycline which she is still taking.  She subsequently developed Clostridium difficile colitis and was started on vancomycin.  She had severe nausea and vomiting 3 days ago.  The dose of vancomycin was subsequently reduced.  She is no longer experiencing nausea.  She also denies abdominal pain.  She developed hematochezia 3 days ago that has progressed to heavy gross gastrointestinal bleeding with clots today.  She has generalized weakness but denies lightheadedness and dizziness.  She denies headache, chest pain, and shortness of breath.  She takes aspirin and Brilinta.         Past Medical History:   Diagnosis Date    Allergy     Altered mental status 06/19/2022    DYSARTHRIA, SPASTIC MOVEMENTS & DIFFICULTY SWALLOWING    Anemia     Anxiety     Arthritis     Cataract     both removed    Colon polyps     Coronary artery disease     Depression     Diabetes mellitus, type II     Disorder of kidney and ureter     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/2023    Fibromyalgia     Follicular lymphoma     GERD (gastroesophageal reflux disease)     HTN (hypertension)     Hyperlipidemia     MI (myocardial infarction) 03/2019    Personal history of colonic polyps     Restless leg syndrome     Stroke       Past Surgical History:   Procedure Laterality Date    APPLICATION OF WOUND VACUUM-ASSISTED CLOSURE DEVICE Right 9/25/2024    Procedure: APPLICATION, WOUND VAC;   Surgeon: Neto Davalos MD;  Location: [address];  Service: Orthopedics;  Laterality: Right;    COLONOSCOPY  11/07/2012    Colon polyp found; repeat in 5 years    DEBRIDEMENT OF LOCAL FLAP Right 9/25/2024    Procedure: DEBRIDEMENT, LOCAL FLAP;  Surgeon: Neto Davalos MD;  [address];  Service: Orthopedics;  Laterality: Right;    ELBOW SURGERY Right 2015    dislocation repair     ESOPHAGOGASTRODUODENOSCOPY  11/07/2012    atrophic gastritis, H pylori testing negative    INCISION AND DRAINAGE FOOT Right 6/2/2021    Procedure: INCISION AND DRAINAGE, FOOT, bone biopsy;  Surgeon: Quiana Penn DPM;  Location: [facility] OR;  Service: Podiatry;  Laterality: Right;    IRRIGATION AND DEBRIDEMENT OF LOWER EXTREMITY Right 9/25/2024    Procedure: IRRIGATION AND DEBRIDEMENT, LOWER EXTREMITY; slider table, supine, bone foam, cysto tubing, 6L NS/dakins/peroxide, culture swabs;  Surgeon: Neto Davalos MD;  Location: [address];  Service: Orthopedics;  Laterality: Right;    KNEE SURGERY Bilateral 2015    scoped    LEFT HEART CATHETERIZATION Left 3/29/2019    Procedure: Left heart cath;  Surgeon: Bladimir Barbosa MD;  Location: [facility] CATH LAB;  Service: Cardiology;  Laterality: Left;    LEFT HEART CATHETERIZATION Left 11/18/2019    Procedure: Left heart cath;  Surgeon: Karl Rico MD;  Location: [facility] CATH LAB;  Service: Cardiology;  Laterality: Left;    LEFT HEART CATHETERIZATION Left 1/8/2020    Procedure: Left heart cath, right radial, noon start;  Surgeon: Christos Monreal MD;  Location: [facility] CATH LAB;  Service: Cardiology;  Laterality: Left;  RN Pre Op 1-6-20.  To be admitted 1-7-20 sor Aspirin Disensitation    OPEN REDUCTION AND INTERNAL FIXATION (ORIF) OF FRACTURE OF ACETABULUM Right 8/16/2024    Procedure: ORIF, FRACTURE, ACETABULUM;  Surgeon: Neto Davalos MD;  Location: [address];  Service: Orthopedics;  Laterality: Right;  anterior and lateral  pelvic incisions    OPEN REDUCTION AND INTERNAL FIXATION (ORIF) OF PILON FRACTURE Right 8/14/2024    Procedure: ORIF, FRACTURE, PILON;  Surgeon: Neto Davalos MD;  Location: [facility] OR [address] [place] [address];  Service: Orthopedics;  Laterality: Right;    TONSILLECTOMY  1955    ULTRASOUND GUIDANCE  1/8/2020    Procedure: Ultrasound Guidance;  Surgeon: Christos Monreal MD;  Location: [facility] CATH LAB;  Service: Cardiology;;      Family History   Problem Relation Name Age of Onset    Cancer Mother          colon    Heart disease Mother      Cancer Father          lung    Lung cancer Brother      Diabetes Sister      Hypertension Sister      Allergy (severe) Daughter erin     No Known Problems Daughter      Stroke Neg Hx      Hyperlipidemia Neg Hx        Social History     Socioeconomic History    Marital status:     Number of children: 2   Occupational History    Occupation: house wife    Occupation: [employer] [place] [employer]   Tobacco Use    Smoking status: Never    Smokeless tobacco: Never   Substance and Sexual Activity    Alcohol use: Not Currently    Drug use: Never    Sexual activity: Not Currently     Partners: Male   Social History Narrative     2021.  2 dtr.  Lives with .  3 cats and a dog.  Retired.  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  Lives in house, 1 story and 4 steps up and has a ramp.      Enjoys crafting.  Unable to bowl due to myalgias.       Social Drivers of Health     Financial Resource Strain: Low Risk  (10/31/2024)    Overall Financial Resource Strain (CARDIA)     Difficulty of Paying Living Expenses: Not hard at all   Recent Concern: Financial Resource Strain - Medium Risk (10/31/2024)    Overall Financial Resource Strain (CARDIA)     Difficulty of Paying Living Expenses: Somewhat hard   Food Insecurity: No Food Insecurity (10/31/2024)    Hunger Vital Sign     Worried About Running Out of Food in the Last Year: Never true     Ran Out of Food in the Last Year: Never true    Transportation Needs: No Transportation Needs (10/31/2024)    TRANSPORTATION NEEDS     Transportation : No   Physical Activity: Inactive (10/31/2024)    Exercise Vital Sign     Days of Exercise per Week: 0 days     Minutes of Exercise per Session: 0 min   Stress: Stress Concern Present (10/31/2024)    [employer] [place] [employer] - Occupational Stress Questionnaire     Feeling of Stress : Very much   Housing Stability: Low Risk  (10/31/2024)    Housing Stability Vital Sign     Unable to Pay for Housing in the Last Year: No     Homeless in the Last Year: No      Review of patient's allergies indicates:   Allergen Reactions    Novolin 70/30 (semi-synthetic) Nausea And Vomiting     Severe vomiting on Relion 70/30    Sulfa (sulfonamide antibiotics) Anaphylaxis    Talwin [pentazocine lactate] Anaphylaxis    Victoza [liraglutide] Nausea And Vomiting    Glipizide Nausea Only    Codeine     Influenza virus vaccines Hives    Iodine and iodide containing products Hives    Levetiracetam Itching    Neurontin [gabapentin]      Possible associated myoclonic jerk    Rituxan [rituximab] Hives    Zoloft [sertraline] Nausea And Vomiting           Physical Examination     Initial Vitals [10/30/24 1817]   BP Pulse Resp Temp SpO2   (!) 118/50 84 20 98.1 °F (36.7 °C) 100 %      MAP       --           Physical Exam    Nursing note and vitals reviewed.  Constitutional: She is not diaphoretic. No distress.   HENT: Mouth/Throat: Mucous membranes are normal.   Cardiovascular:  Regular rhythm.   Tachycardia present.         Murmur heard.  Systolic murmur is present with a grade of 3/6.  Pulmonary/Chest: No respiratory distress. She has no wheezes. She has no rhonchi.   Abdominal: Abdomen is soft. She exhibits no distension. There is no abdominal tenderness.   Genitourinary:    Genitourinary Comments: No external hemorrhoids.  Normal anal tone.  No anorectal masses.  No stool in the rectum.  Gross dark red blood without clots  present.       Neurological: She is alert and oriented to person, place, and time. GCS score is 15. GCS eye subscore is 4. GCS verbal subscore is 5. GCS motor subscore is 6.   Skin: Skin is warm and dry. No pallor.   Psychiatric: She has a normal mood and affect. Her speech is normal and behavior is normal. She is not actively hallucinating. She is attentive.            Labs     Labs Reviewed   CBC W/ AUTO DIFFERENTIAL - Abnormal       Result Value    WBC 10.99      RBC 3.15 (*)     Hemoglobin 8.8 (*)     Hematocrit 27.5 (*)     MCV 87      MCH 27.9      MCHC 32.0      RDW 14.5      Platelets 222      MPV 13.5 (*)     Immature Granulocytes 0.5      Gran # (ANC) 7.8 (*)     Immature Grans (Abs) 0.06 (*)     Lymph # 1.7      Mono # 1.1 (*)     Eos # 0.2      Baso # 0.10      nRBC 0      Gran % 70.6      Lymph % 15.5 (*)     Mono % 10.4      Eosinophil % 2.1      Basophil % 0.9      Differential Method Automated     COMPREHENSIVE METABOLIC PANEL - Abnormal    Sodium 136      Potassium 4.2      Chloride 100      CO2 23      Glucose 147 (*)     BUN 67 (*)     Creatinine 2.7 (*)     Calcium 9.3      Total Protein 6.5      Albumin 2.5 (*)     Total Bilirubin 0.4      Alkaline Phosphatase 181 (*)     AST 51 (*)     ALT 37      eGFR 18.0 (*)     Anion Gap 13     TROPONIN I - Abnormal    Troponin I 0.071 (*)    PROTIME-INR    Prothrombin Time 10.8      INR 1.0     APTT    aPTT 28.9     TYPE & SCREEN    Group & Rh O POS      Indirect [place] NEG      Specimen Outdate 11/02/2024 23:59          Imaging     Imaging Results              X-Ray Abdomen AP 1 View (Final result)  Result time 10/31/24 02:00:10      Final result by Sudheer Reeves DO (10/31/24 02:00:10)                   Impression:      Nonobstructive bowel gas pattern.      Electronically signed by: Sudheer Reeves  Date:    10/31/2024  Time:    02:00               Narrative:    EXAMINATION:  XR ABDOMEN AP 1 VIEW    CLINICAL HISTORY:  Abdominal pain with C.  Diff;    TECHNIQUE:  AP View(s) of the abdomen was performed.    COMPARISON:  None    FINDINGS:  There is a normal, nonobstructive bowel gas pattern. Free air cannot be excluded on this supine radiograph, though none is seen.  There is no abnormal calcification. The visualized osseous structures demonstrate degenerative changes and osteopenia.  Hardware overlies the right pelvis.  The visualized lung bases are clear.  There are vascular calcifications.  There is an electronic device overlying the left upper quadrant.                                        ED Course     The patient received the following medications:  Medications   None      ED Course as of 11/01/24 2106   Wed Oct 30, 2024   2042 EKG 12-lead  Independently interpreted by Dr. Meng:   Normal sinus rhythm. Ventricular rate 93 bpm.  Normal axis.  Normal QRS duration and QT interval.  No ST segment elevation or depression.  Normal T-wave morphology. Overall impression:  Normal EKG. [LP]      ED Course User Index  [LP] Antoine Meng III, MD        Medical Decision Making                 Medical Decision Making  She denies abdominal pain.  Her abdomen was soft and nontender.  Labs showed slight worsening chronic anemia as well as worsening of CKD.  There was no indication for emergent blood transfusion.  She was admitted to Hospital Medicine for further workup and management.    Problems Addressed:  Acute kidney injury superimposed on chronic kidney disease: acute illness or injury  Elevated troponin: acute illness or injury  Fatigue: acute illness or injury  Gastrointestinal hemorrhage, unspecified gastrointestinal hemorrhage type: acute illness or injury    Amount and/or Complexity of Data Reviewed  Labs: ordered.  ECG/medicine tests:  Decision-making details documented in ED Course.              Diagnoses       ICD-10-CM ICD-9-CM   1. Gastrointestinal hemorrhage, unspecified gastrointestinal hemorrhage type  K92.2 578.9   2. Fatigue  R53.83 780.79    3. Acute kidney injury superimposed on chronic kidney disease  N17.9 584.9    N18.9 585.9   4. Elevated troponin  R79.89 790.6   5. Chest pain  R07.9 786.50   6. Fluid excess  E87.70 276.69   7. At risk for long QT syndrome  Z91.89 V15.89         Dispostion      ED Disposition Condition    Observation Stable             Antoine Meng III, MD  11/01/24 4545

## 2024-10-31 NOTE — ASSESSMENT & PLAN NOTE
Patient with known C diff colitis after starting course of doxycycline present with hematochezia after reduction of oral vancomycin dosing. C diff testing positive this admission. No leukocytosis, ileus or abnormal gas patter on KUB. She does have KYA; therefore, with current severe disease. Recommend oral vanc 125 QID.    Recommendations:  - oral vancomycin 125 QID  - monitor for signs of fulminant disease  - message sent to primary team with recommended plan of care

## 2024-10-31 NOTE — ASSESSMENT & PLAN NOTE
Fell on 8/14 with resultant right ankle and hip fracture. Underwent surgical fixation and was discharged to rehab. On discharge from rehab R ankle wound dehisced. Underwent wound debridement during readmission from 9/24-10/7. Ankle remains wrapped and braced with severe pain relieved by robaxin. Notes shooting pain but previously was unable to tolerate gabapentin. On 6 week doxycycline course until 11/12 complicated by persistent C. Diff colitis.    Plan:  - consult wound care  - consult to ID for abx recs  - continue doxycycline course  - non-weight bearing until at least 11/1  - pain control with oxy 5 q8h prn

## 2024-10-31 NOTE — CONSULTS
[facility] - Internal [facility] Telemetry  Infectious Disease  Consult Note    Patient Name: Lorena Contreras  MRN: [id number]  Admission Date: 10/30/2024  Hospital Length of Stay: 0 days  Attending Physician: Marita Haywood MD  Primary Care Provider: Jorge Paris MD     Isolation Status: Special Contact    Patient information was obtained from patient, past medical records, and ER records.      Inpatient consult to Infectious Diseases  Consult performed by: Yanna Nelson DO  Consult ordered by: Ayesha Buck MD        Assessment/Plan:     ID  * C. difficile colitis  Patient with known C diff colitis after starting course of doxycycline present with hematochezia after reduction of oral vancomycin dosing. C diff testing positive this admission. No leukocytosis, ileus or abnormal gas patter on KUB. She does have KYA; therefore, with current severe disease. Recommend oral vanc 125 QID.    Recommendations:  - oral vancomycin 125 QID  - monitor for signs of fulminant disease  - message sent to primary team with recommended plan of care        Thank you for your consult. I will follow-up with patient. Please contact us if you have any additional questions.    Yanna Nelson DO  Infectious Disease  [facility] - Internal [facility] Telemetry    Subjective:     Principal Problem: C. difficile colitis    HPI: Lorena Contreras is a 74-year-old woman with Fibromyalgia, lymphoma s/p chemo, HTN, HLD, DMT2, CKD3b, stroke, MI / CAD s/p PCI, and HFpEF who was admitted for hematochezia of large volume with blood clots. ID is consulted for antibiotic recommendations in the setting of persistent C dif.    She was seen for ankle fracture, on discharge from SNF her ankle wound dehisced requiring surgical debridement and a 6-week course of doxycycline. At time of that admission she was found to have C. diff colitis. She was discharged on PO vancomycin until completion of doxycyline course. Her diarrhea is still occurring every  2-3 hours and 3 days ago she developed nausea and vomiting. Her outpatient ID physician advised her to taper her PO vancomycin from q6h to BID. The day PTA, her daughter noticed a darkening of her stools and today she was noted to have a pad filled with bright red blood and clots. While in the ED she developed abdominal pain localized to RLQ. No further bowel movements since and has remained NPO. She notes chills, anxiety, ankle pain, lightheadedness and RLQ pain but denies any fevers, changes in vision, chest pains, vomiting, or extremity swelling. Last dose of ASA and brilinta was this morning.      In the ED she was hemodynamically stable with a hgb of 8.8 near her baseline. BUN 67 and Cr 2.7 with baseline of 1.5. AST 51, ALT 37, ALKP 181 all at baseline. Troponin mildly elevated at 0.071. EKG with no acute changes.     Past Medical History:   Diagnosis Date    Allergy     Altered mental status 06/19/2022    DYSARTHRIA, SPASTIC MOVEMENTS & DIFFICULTY SWALLOWING    Anemia     Anxiety     Arthritis     Cataract     both removed    Colon polyps     Coronary artery disease     Depression     Diabetes mellitus, type II     Disorder of kidney and ureter     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/2023    Fibromyalgia     Follicular lymphoma     GERD (gastroesophageal reflux disease)     HTN (hypertension)     Hyperlipidemia     MI (myocardial infarction) 03/2019    Personal history of colonic polyps     Restless leg syndrome     Stroke        Past Surgical History:   Procedure Laterality Date    APPLICATION OF WOUND VACUUM-ASSISTED CLOSURE DEVICE Right 9/25/2024    Procedure: APPLICATION, WOUND VAC;  Surgeon: Neto Davalos MD;  Location: [facility] OR [address] [place] [address];  Service: Orthopedics;  Laterality: Right;    COLONOSCOPY  11/07/2012    Colon polyp found; repeat in 5 years    DEBRIDEMENT OF LOCAL FLAP Right 9/25/2024    Procedure: DEBRIDEMENT, LOCAL FLAP;  Surgeon: Neto Davalos MD;  Location: [facility] OR [address] [place] [address];   Service: Orthopedics;  Laterality: Right;    ELBOW SURGERY Right 2015    dislocation repair     ESOPHAGOGASTRODUODENOSCOPY  11/07/2012    atrophic gastritis, H pylori testing negative    INCISION AND DRAINAGE FOOT Right 6/2/2021    Procedure: INCISION AND DRAINAGE, FOOT, bone biopsy;  Surgeon: Quiana Penn DPM;  Location: [facility];  Service: Podiatry;  Laterality: Right;    IRRIGATION AND DEBRIDEMENT OF LOWER EXTREMITY Right 9/25/2024    Procedure: IRRIGATION AND DEBRIDEMENT, LOWER EXTREMITY; slider table, supine, bone foam, cysto tubing, 6L NS/dakins/peroxide, culture swabs;  Surgeon: Neto Davalos MD;  Location: [address];  Service: Orthopedics;  Laterality: Right;    KNEE SURGERY Bilateral 2015    scoped    LEFT HEART CATHETERIZATION Left 3/29/2019    Procedure: Left heart cath;  Surgeon: Bladimir Barbosa MD;  Location: [facility] CATH LAB;  Service: Cardiology;  Laterality: Left;    LEFT HEART CATHETERIZATION Left 11/18/2019    Procedure: Left heart cath;  Surgeon: Karl Rico MD;  Location: [facility] CATH LAB;  Service: Cardiology;  Laterality: Left;    LEFT HEART CATHETERIZATION Left 1/8/2020    Procedure: Left heart cath, right radial, noon start;  Surgeon: Christos Monreal MD;  Location: [facility] CATH LAB;  Service: Cardiology;  Laterality: Left;  RN Pre Op 1-6-20.  To be admitted 1-7-20 sor Aspirin Disensitation    OPEN REDUCTION AND INTERNAL FIXATION (ORIF) OF FRACTURE OF ACETABULUM Right 8/16/2024    Procedure: ORIF, FRACTURE, ACETABULUM;  Surgeon: Neto Davalos MD;  Location: [address];  Service: Orthopedics;  Laterality: Right;  anterior and lateral pelvic incisions    OPEN REDUCTION AND INTERNAL FIXATION (ORIF) OF PILON FRACTURE Right 8/14/2024    Procedure: ORIF, FRACTURE, PILON;  Surgeon: Neto Davalos MD;  Location: [address];  Service: Orthopedics;  Laterality: Right;    TONSILLECTOMY  1955    ULTRASOUND GUIDANCE  1/8/2020    Procedure: Ultrasound  Guidance;  Surgeon: Christos Monreal MD;  Location: [facility] CATH LAB;  Service: Cardiology;;       Review of patient's allergies indicates:   Allergen Reactions    Novolin 70/30 (semi-synthetic) Nausea And Vomiting     Severe vomiting on Relion 70/30    Sulfa (sulfonamide antibiotics) Anaphylaxis    Talwin [pentazocine lactate] Anaphylaxis    Victoza [liraglutide] Nausea And Vomiting    Glipizide Nausea Only    Citric acid     Codeine     Influenza virus vaccines Hives    Iodine and iodide containing products Hives    Levetiracetam Itching    Neurontin [gabapentin]      Possible associated myoclonic jerk    Rituxan [rituximab] Hives    Zoloft [sertraline] Nausea And Vomiting       Medications:  Medications Prior to Admission   Medication Sig    aspirin 81 MG Chew Take 1 tablet (81 mg total) by mouth once daily. Hold to avoid bleed risk on triple therapy and then resume on oct 27 once eliquis stops on oct 26th    BRILINTA 90 mg tablet Take 1 tablet by mouth twice daily    DEXCOM G7  Misc Use 1  to track blood glucose, ICD10: E11.65    DEXCOM G7 SENSOR Samina Use 1 sensor every 10 days to track blood glucose, ICD10: E11.65, okay with 90 day supply if possible    doxycycline (VIBRA-TABS) 100 MG tablet Take 1 tablet (100 mg total) by mouth every 12 (twelve) hours. End date 11/7/24    FLUoxetine 40 MG capsule Take 1 capsule (40 mg total) by mouth once daily.    furosemide (LASIX) 40 MG tablet Take 1 tablet (40 mg total) by mouth 2 (two) times daily.    hydrALAZINE (APRESOLINE) 100 MG tablet Take 1 tablet (100 mg total) by mouth every 8 (eight) hours.    insulin aspart U-100 (NOVOLOG) 100 unit/mL (3 mL) InPn pen Inject 0-10 Units into the skin before meals and at bedtime as needed (Hyperglycemia).    insulin glargine U-100, Lantus, 100 unit/mL (3 mL) SubQ InPn pen Inject 12 Units into the skin every evening.    isosorbide mononitrate (IMDUR) 60 MG 24 hr tablet Take 1 tablet (60 mg total) by mouth once daily.     "LORazepam (ATIVAN) 1 MG tablet TAKE 1 TABLET BY MOUTH ONCE DAILY AS NEEDED FOR ANXIETY    metoprolol succinate (TOPROL-XL) 25 MG 24 hr tablet Take 1 tablet (25 mg total) by mouth once daily.    ondansetron (ZOFRAN-ODT) 8 MG TbDL Dissolve 1 tablet (8 mg total) by mouth every 8 (eight) hours as needed (nausea).    oxyCODONE (ROXICODONE) 5 MG immediate release tablet Take 1 tablet (5 mg total) by mouth every 8 (eight) hours as needed (pain scale 4-6).    pen needle, diabetic (BD ULTRA-FINE SAGAR PEN NEEDLE) 32 gauge x 5/32" Ndle One pen needle use with insulin pen 4 times a day.  ICD-10: E11.9    QUEtiapine (SEROQUEL) 50 MG tablet Take 1 tablet (50 mg total) by mouth nightly as needed (insomnia).    ticagrelor (BRILINTA) 90 mg tablet Take 1 tablet (90 mg total) by mouth 2 (two) times daily.    vancomycin (VANCOCIN) 125 MG capsule Take 1 capsule by mouth every 6 hours until October 9th then take twice daily until off IV antibiotics (11/7/24) then stop    albuterol (PROVENTIL/VENTOLIN HFA) 90 mcg/actuation inhaler Inhale 2 puffs into the lungs every 6 (six) hours as needed for Wheezing or Shortness of Breath.    aluminum & magnesium hydroxide-simethicone (MYLANTA MAX STRENGTH) 400-400-40 mg/5 mL suspension Take 30 mLs by mouth every 6 (six) hours as needed for Indigestion.    hydrOXYzine HCL (ATARAX) 25 MG tablet Take 1 tablet (25 mg total) by mouth 3 (three) times daily as needed for Itching.    OZEMPIC 1 mg/dose (4 mg/3 mL) Inject 1 mg into the skin every 7 days. HOLD [facility] at [facility]     Antibiotics (From admission, onward)      Start     Stop Route Frequency Ordered    10/31/24 0900  doxycycline tablet 100 mg         -- Oral Every 12 hours 10/31/24 0013    10/31/24 0900  vancomycin 125 mg/5 mL oral solution 125 mg  (C. difficile Infection (CDI) Treatment Order Panel)         11/08/24 0859 Oral Every 12 hours 10/31/24 0013          Antifungals (From admission, onward)      None          Antivirals (From admission, onward) "      None             Immunization History   Administered Date(s) Administered    PPD Test 08/15/2024    Pneumococcal Conjugate - 13 Valent 11/28/2016    Pneumococcal Polysaccharide - 23 Valent 02/23/2012, 02/01/2018    Tdap 02/23/2012, 11/27/2012, 05/28/2021       Family History       Problem Relation (Age of Onset)    Allergy (severe) Daughter    Cancer Mother, Father    Diabetes Sister    Heart disease Mother    Hypertension Sister    Lung cancer Brother    No Known Problems Daughter          Social History     Socioeconomic History    Marital status:     Number of children: 2   Occupational History    Occupation: house wife    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  2 dtr.  Lives with .  3 cats and a dog.  Retired.  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  Lives in house, 1 story and 4 steps up and has a ramp.      Enjoys crafting.  Unable to bowl due to myalgias.       Social Drivers of Health     Financial Resource Strain: Low Risk  (10/31/2024)    Overall Financial Resource Strain (CARDIA)     Difficulty of Paying Living Expenses: Not hard at all   Recent Concern: Financial Resource Strain - Medium Risk (10/31/2024)    Overall Financial Resource Strain (CARDIA)     Difficulty of Paying Living Expenses: Somewhat hard   Food Insecurity: No Food Insecurity (10/31/2024)    Hunger Vital Sign     Worried About Running Out of Food in the Last Year: Never true     Ran Out of Food in the Last Year: Never true   Transportation Needs: No Transportation Needs (10/31/2024)    TRANSPORTATION NEEDS     Transportation : No   Physical Activity: Inactive (10/31/2024)    Exercise Vital Sign     Days of Exercise per Week: 0 days     Minutes of Exercise per Session: 0 min    Stress: Stress Concern Present (10/31/2024)    [employer] [place] [employer] - Occupational Stress Questionnaire     Feeling of Stress : Very much   Housing Stability: Low Risk  (10/31/2024)    Housing Stability Vital Sign     Unable to Pay for Housing in the Last Year: No     Homeless in the Last Year: No     Review of Systems   Constitutional:  Negative for chills and fever.   Respiratory:  Negative for shortness of breath.    Cardiovascular:  Negative for chest pain and leg swelling.   Gastrointestinal:  Positive for abdominal pain, blood in stool and nausea. Negative for abdominal distention, constipation and vomiting.   Musculoskeletal:  Positive for arthralgias and gait problem.   Skin:  Negative for rash.   Neurological:  Positive for light-headedness.     Objective:     Vital Signs (Most Recent):  Temp: 97.6 °F (36.4 °C) (10/31/24 1127)  Pulse: 89 (10/31/24 1127)  Resp: 14 (10/31/24 1127)  BP: (!) 101/57 (10/31/24 1127)  SpO2: 96 % (10/31/24 1127) Vital Signs (24h Range):  Temp:  [97.6 °F (36.4 °C)-98.1 °F (36.7 °C)] 97.6 °F (36.4 °C)  Pulse:  [78-96] 89  Resp:  [14-20] 14  SpO2:  [95 %-100 %] 96 %  BP: ()/(50-64) 101/57     Weight: 86 kg (189 lb 9.5 oz)  Body mass index is 28.83 kg/m².    Estimated Creatinine Clearance: 20.2 mL/min (A) (based on SCr of 2.8 mg/dL (H)).     Physical Exam  Vitals and nursing note reviewed.   Constitutional:       General: She is not in acute distress.     Appearance: She is normal weight. She is ill-appearing. She is not toxic-appearing.   HENT:      Head: Normocephalic and atraumatic.   Eyes:      Extraocular Movements: Extraocular movements intact.      Conjunctiva/sclera: Conjunctivae normal.   Cardiovascular:      Rate and Rhythm: Normal rate and regular rhythm.   Pulmonary:      Effort: Pulmonary effort is normal. No respiratory distress.   Abdominal:      General: Abdomen is flat. There is no distension.      Palpations: Abdomen is soft.       Tenderness: There is abdominal tenderness. There is no guarding.   Musculoskeletal:      Cervical back: Normal range of motion and neck supple.      Right lower leg: No edema.      Left lower leg: No edema.   Skin:     General: Skin is warm and dry.   Neurological:      Mental Status: She is alert.      GCS: GCS eye subscore is 4. GCS verbal subscore is 5. GCS motor subscore is 6.      Motor: Weakness present.   Psychiatric:         Mood and Affect: Mood normal.         Behavior: Behavior normal.         Thought Content: Thought content normal.          Significant Labs: All pertinent labs within the past 24 hours have been reviewed.    Significant Imaging: I have reviewed all pertinent imaging results/findings within the past 24 hours.

## 2024-10-31 NOTE — ASSESSMENT & PLAN NOTE
Chronic poorly controlled anxiety requiring PRN ativan near daily. Exacerbated by ongoing health concerns.    Plan:  - continue home fluoxetine  - PRN ativan  - hold home seroquel for elevated qtc, trial of melatonin and daily ekg

## 2024-10-31 NOTE — H&P
David Mijares - Internal Medicine [facility] Medicine  History & Physical    Patient Name: Lorena Contreras  MRN: [id number]  Patient Class: OP- Observation  Admission Date: 10/30/2024  Attending Physician: Irma Stout MD   Primary Care Provider: Jorge Paris MD         Patient information was obtained from patient and ER records.     Subjective:     Principal Problem:C. difficile colitis    Chief Complaint:   Chief Complaint   Patient presents with    Rectal Bleeding     Blood stools since yesterday, currently bedridden from fall, has broken pelvis and right leg. On antibiotics, currently has c diff        HPI: Lorena Contreras is a 74y female with a pmhx of CKD, CAD (MI 2019), HTN, fibromyalgia, and lymphoma (s/p chemo, in remission) who presented today with large volume bright red blood with clots in her stool. In August she fell injuring her right hip and ankle which required surgical fixation. On discharge from SNF her ankle wound dehisced requiring surgical debridement. At time of that admission she was found to have c. Diff colitis. She was discharged on a 6 week course of doxycycline and PO vancomycin until completion of doxycyline course. Her diarrhea is still occurring every 2-3 hours and 3 days ago she developed nausea and vomiting. Her outpatient ID physician advised her to taper her PO vancomycin from q6h to BID. Yesterday her daughter noticed a darkening of her stools and today she was noted to have a pad filled with bright red blood and clots. While in the ED she developed abdominal pain localized to RLQ. No further bowel movements since and has remained NPO. She notes chills, anxiety, ankle pain, and new abdominal pain but denies any fevers, current dizziness, changes in vision, chest pains, vomiting, or extremity swelling. She reports feeling dizzy when arising in the morning for the past few weeks. Last dose of ASA and brilinta was this morning.     In the ED she was  hemodynamically stable with a hgb of 8.8 near her baseline. BUN 67 and Cr 2.7 with baseline of 1.5. AST 51, ALT 37, ALKP 181 all at baseline. Troponin mildly elevated at 0.071. EKG with no acute changes.         Past Medical History:   Diagnosis Date    Allergy     Altered mental status 06/19/2022    DYSARTHRIA, SPASTIC MOVEMENTS & DIFFICULTY SWALLOWING    Anemia     Anxiety     Arthritis     Cataract     both removed    Colon polyps     Coronary artery disease     Depression     Diabetes mellitus, type II     Disorder of kidney and ureter     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/2023    Fibromyalgia     Follicular lymphoma     GERD (gastroesophageal reflux disease)     HTN (hypertension)     Hyperlipidemia     MI (myocardial infarction) 03/2019    Personal history of colonic polyps     Restless leg syndrome     Stroke        Past Surgical History:   Procedure Laterality Date    APPLICATION OF WOUND VACUUM-ASSISTED CLOSURE DEVICE Right 9/25/2024    Procedure: APPLICATION, WOUND VAC;  Surgeon: Neto Davalos MD;  Location: [facility] OR [address] [place] [address];  Service: Orthopedics;  Laterality: Right;    COLONOSCOPY  11/07/2012    Colon polyp found; repeat in 5 years    DEBRIDEMENT OF LOCAL FLAP Right 9/25/2024    Procedure: DEBRIDEMENT, LOCAL FLAP;  Surgeon: Neto Davalos MD;  Location: [facility] OR [address] [place] [address];  Service: Orthopedics;  Laterality: Right;    ELBOW SURGERY Right 2015    dislocation repair     ESOPHAGOGASTRODUODENOSCOPY  11/07/2012    atrophic gastritis, H pylori testing negative    INCISION AND DRAINAGE FOOT Right 6/2/2021    Procedure: INCISION AND DRAINAGE, FOOT, bone biopsy;  Surgeon: Quiana Penn DPM;  Location: [facility] OR;  Service: Podiatry;  Laterality: Right;    IRRIGATION AND DEBRIDEMENT OF LOWER EXTREMITY Right 9/25/2024    Procedure: IRRIGATION AND DEBRIDEMENT, LOWER EXTREMITY; slider table, supine, bone foam, cysto tubing, 6L NS/dakins/peroxide, culture swabs;  Surgeon: Neto Davalos,  MD;  [address];  Service: Orthopedics;  Laterality: Right;    KNEE SURGERY Bilateral 2015    scoped    LEFT HEART CATHETERIZATION Left 3/29/2019    Procedure: Left heart cath;  Surgeon: Bladimir Barbosa MD;  Location: [facility] CATH LAB;  Service: Cardiology;  Laterality: Left;    LEFT HEART CATHETERIZATION Left 11/18/2019    Procedure: Left heart cath;  Surgeon: Karl Rico MD;  Location: [facility] CATH LAB;  Service: Cardiology;  Laterality: Left;    LEFT HEART CATHETERIZATION Left 1/8/2020    Procedure: Left heart cath, right radial, noon start;  Surgeon: Christos Monreal MD;  Location: [facility] CATH LAB;  Service: Cardiology;  Laterality: Left;  RN Pre Op 1-6-20.  To be admitted 1-7-20 sor Aspirin Disensitation    OPEN REDUCTION AND INTERNAL FIXATION (ORIF) OF FRACTURE OF ACETABULUM Right 8/16/2024    Procedure: ORIF, FRACTURE, ACETABULUM;  Surgeon: Neto Davalos MD;  Location: [address];  Service: Orthopedics;  Laterality: Right;  anterior and lateral pelvic incisions    OPEN REDUCTION AND INTERNAL FIXATION (ORIF) OF PILON FRACTURE Right 8/14/2024    Procedure: ORIF, FRACTURE, PILON;  Surgeon: Neto Davalos MD;  Location: [address];  Service: Orthopedics;  Laterality: Right;    TONSILLECTOMY  1955    ULTRASOUND GUIDANCE  1/8/2020    Procedure: Ultrasound Guidance;  Surgeon: Christos Monreal MD;  Location: [facility] CATH LAB;  Service: Cardiology;;       Review of patient's allergies indicates:   Allergen Reactions    Novolin 70/30 (semi-synthetic) Nausea And Vomiting     Severe vomiting on Relion 70/30    Sulfa (sulfonamide antibiotics) Anaphylaxis    Talwin [pentazocine lactate] Anaphylaxis    Victoza [liraglutide] Nausea And Vomiting    Glipizide Nausea Only    Citric acid     Codeine     Influenza virus vaccines Hives    Iodine and iodide containing products Hives    Levetiracetam Itching    Neurontin [gabapentin]      Possible associated myoclonic jerk    Rituxan  "[rituximab] Hives    Zoloft [sertraline] Nausea And Vomiting       No current facility-administered medications on file prior to encounter.     Current Outpatient Medications on File Prior to Encounter   Medication Sig    aspirin 81 MG Chew Take 1 tablet (81 mg total) by mouth once daily. Hold to avoid bleed risk on triple therapy and then resume on oct 27 once eliquis stops on oct 26th    BRILINTA 90 mg tablet Take 1 tablet by mouth twice daily    DEXCOM G7  Misc Use 1  to track blood glucose, ICD10: E11.65    DEXCOM G7 SENSOR Samina Use 1 sensor every 10 days to track blood glucose, ICD10: E11.65, okay with 90 day supply if possible    doxycycline (VIBRA-TABS) 100 MG tablet Take 1 tablet (100 mg total) by mouth every 12 (twelve) hours. End date 11/7/24    FLUoxetine 40 MG capsule Take 1 capsule (40 mg total) by mouth once daily.    furosemide (LASIX) 40 MG tablet Take 1 tablet (40 mg total) by mouth 2 (two) times daily.    hydrALAZINE (APRESOLINE) 100 MG tablet Take 1 tablet (100 mg total) by mouth every 8 (eight) hours.    insulin aspart U-100 (NOVOLOG) 100 unit/mL (3 mL) InPn pen Inject 0-10 Units into the skin before meals and at bedtime as needed (Hyperglycemia).    insulin glargine U-100, Lantus, 100 unit/mL (3 mL) SubQ InPn pen Inject 12 Units into the skin every evening.    isosorbide mononitrate (IMDUR) 60 MG 24 hr tablet Take 1 tablet (60 mg total) by mouth once daily.    LORazepam (ATIVAN) 1 MG tablet TAKE 1 TABLET BY MOUTH ONCE DAILY AS NEEDED FOR ANXIETY    metoprolol succinate (TOPROL-XL) 25 MG 24 hr tablet Take 1 tablet (25 mg total) by mouth once daily.    ondansetron (ZOFRAN-ODT) 8 MG TbDL Dissolve 1 tablet (8 mg total) by mouth every 8 (eight) hours as needed (nausea).    oxyCODONE (ROXICODONE) 5 MG immediate release tablet Take 1 tablet (5 mg total) by mouth every 8 (eight) hours as needed (pain scale 4-6).    pen needle, diabetic (BD ULTRA-FINE SAGAR PEN NEEDLE) 32 gauge x 5/32" " Ndle One pen needle use with insulin pen 4 times a day.  ICD-10: E11.9    QUEtiapine (SEROQUEL) 50 MG tablet Take 1 tablet (50 mg total) by mouth nightly as needed (insomnia).    ticagrelor (BRILINTA) 90 mg tablet Take 1 tablet (90 mg total) by mouth 2 (two) times daily.    vancomycin (VANCOCIN) 125 MG capsule Take 1 capsule by mouth every 6 hours until October 9th then take twice daily until off IV antibiotics (11/7/24) then stop    albuterol (PROVENTIL/VENTOLIN HFA) 90 mcg/actuation inhaler Inhale 2 puffs into the lungs every 6 (six) hours as needed for Wheezing or Shortness of Breath.    aluminum & magnesium hydroxide-simethicone (MYLANTA MAX STRENGTH) 400-400-40 mg/5 mL suspension Take 30 mLs by mouth every 6 (six) hours as needed for Indigestion.    hydrOXYzine HCL (ATARAX) 25 MG tablet Take 1 tablet (25 mg total) by mouth 3 (three) times daily as needed for Itching.    OZEMPIC 1 mg/dose (4 mg/3 mL) Inject 1 mg into the skin every 7 days. HOLD while at [facility]     Family History       Problem Relation (Age of Onset)    Allergy (severe) Daughter    Cancer Mother, Father    Diabetes Sister    Heart disease Mother    Hypertension Sister    Lung cancer Brother    No Known Problems Daughter          Tobacco Use    Smoking status: Never    Smokeless tobacco: Never   Substance and Sexual Activity    Alcohol use: Not Currently    Drug use: Never    Sexual activity: Not Currently     Partners: Male     Review of Systems   Constitutional:  Positive for chills. Negative for fever.   HENT:  Negative for congestion.    Eyes:  Negative for visual disturbance.   Respiratory:  Negative for cough, chest tightness and shortness of breath.    Cardiovascular:  Positive for leg swelling (since discharge from rehab, not currently). Negative for chest pain and palpitations.   Gastrointestinal:  Positive for abdominal distention, abdominal pain (RLQ), blood in stool, diarrhea and nausea. Negative for constipation and rectal pain.    Genitourinary:  Negative for dysuria.   Musculoskeletal:  Positive for arthralgias (R ankle). Negative for back pain.   Skin:  Positive for wound (R ankle).   Neurological:  Positive for dizziness (when sitting up in bed in morning for past few weeks), tremors and weakness.   Psychiatric/Behavioral:  Negative for confusion. The patient is nervous/anxious.         Distraught over current health conditions     Objective:     Vital Signs (Most Recent):  Temp: 98.1 °F (36.7 °C) (10/30/24 1909)  Pulse: 93 (10/30/24 2220)  Resp: 20 (10/30/24 2220)  BP: 130/61 (10/30/24 2220)  SpO2: 100 % (10/30/24 2220) Vital Signs (24h Range):  Temp:  [98.1 °F (36.7 °C)] 98.1 °F (36.7 °C)  Pulse:  [84-96] 93  Resp:  [20] 20  SpO2:  [100 %] 100 %  BP: ()/(50-61) 130/61     Weight: 86.2 kg (190 lb)  Body mass index is 27.26 kg/m².     Physical Exam  Constitutional:       General: She is in acute distress.      Appearance: She is ill-appearing.   Eyes:      General: No scleral icterus.     Extraocular Movements: Extraocular movements intact.      Conjunctiva/sclera: Conjunctivae normal.   Cardiovascular:      Rate and Rhythm: Normal rate and regular rhythm.      Pulses: Normal pulses.      Heart sounds: Murmur (systolic) heard.   Pulmonary:      Effort: Pulmonary effort is normal. No respiratory distress.      Breath sounds: Normal breath sounds.   Chest:      Chest wall: No tenderness.   Abdominal:      General: There is no distension.      Palpations: Abdomen is soft.      Tenderness: There is abdominal tenderness (RLQ). There is guarding.   Genitourinary:     Comments: No blood on pad  Musculoskeletal:         General: Tenderness present.      Left lower leg: No edema.      Comments: R ankle wrapped and in brace   Skin:     General: Skin is warm and dry.      Capillary Refill: Capillary refill takes less than 2 seconds.   Neurological:      General: No focal deficit present.      Mental Status: She is alert and oriented to  person, place, and time.   Psychiatric:      Comments: anxious                Significant Labs: All pertinent labs within the past 24 hours have been reviewed.  Recent Lab Results         10/30/24  1946        Albumin 2.5              ALT 37       Anion Gap 13       PTT 28.9  Comment: Refer to local heparin nomogram for intensity/dose specific   therapeutic   range.         AST 51       Baso # 0.10       Basophil % 0.9       BILIRUBIN TOTAL 0.4  Comment: For infants and newborns, interpretation of results should be based  on gestational age, weight and in agreement with clinical  observations.    Premature Infant recommended reference ranges:  Up to 24 hours.............<8.0 mg/dL  Up to 48 hours............<12.0 mg/dL  3-5 days..................<15.0 mg/dL  6-29 days.................<15.0 mg/dL         BUN 67       Calcium 9.3       Chloride 100       CO2 23       Creatinine 2.7       Differential Method Automated       eGFR 18.0       Eos # 0.2       Eos % 2.1       Glucose 147       Gran # (ANC) 7.8       Gran % 70.6       Group & Rh O POS       Hematocrit 27.5       Hemoglobin 8.8       Immature Grans (Abs) 0.06  Comment: Mild elevation in immature granulocytes is non specific and   can be seen in a variety of conditions including stress response,   acute inflammation, trauma and pregnancy. Correlation with other   laboratory and clinical findings is essential.         Immature Granulocytes 0.5       INDIRECT JOAQUIN NEG       INR 1.0  Comment: Coumadin Therapy:  2.0 - 3.0 for INR for all indicators except mechanical heart valves  and antiphospholipid syndromes which should use 2.5 - 3.5.         Lymph # 1.7       Lymph % 15.5       MCH 27.9       MCHC 32.0       MCV 87       Mono # 1.1       Mono % 10.4       MPV 13.5       nRBC 0       Platelet Count 222       Potassium 4.2       PROTEIN TOTAL 6.5       PT 10.8       RBC 3.15       RDW 14.5       Sodium 136       Specimen Outdate 11/02/2024 23:59        Troponin I 0.071  Comment: The reference interval for Troponin I represents the 99th percentile   cutoff   for our facility and is consistent with 3rd generation assay   performance.         WBC 10.99               Significant Imaging: I have reviewed all pertinent imaging results/findings within the past 24 hours.  Pending KUB  Assessment/Plan:     * C. difficile colitis  Diagnosed with C. Diff on admission from 9/24-10/7, likely obtained from outpatient rehab facility. She has had persistent watery diarrhea since discharge every 2-3 hours. Discharged on PO vancomycin until 11/7, but taper started early on 10/27 after she developed nausea and vomiting. N/V now resolved but noted a slightly bloody stool on 10/29 and gross bright red blood with clots on 10/30 prompting presentation to ED. She has been on a course of doxycycline as well for ankle surgical infection.    Plan:  - consult to GI for hematochezia evaluation  - consult to ID for abx recommendations  - continue BID PO vancomycin  - f/u C. Diff panel  - daily cbc/cmp  - no probiotics due to persistent glucose drops while taking them  - no zofran due to elevated qtc, can trial scopolamine patch if nauseous  - hold ASA/brilinta  - NPO    Coronary artery disease  Prior MI in 2019. Remains on ASA/Brilinta. Was placed on warfarin post-op but has since stopped. Trop mildly elevated on admission. No acute EKG changes or chest pain.     Noted to have fluid retention in extremities since her fall. Started on 40mg lasix daily. Last echo from 11/2023. Never diagnosed with HF. No SOB or pitting edema indicating excess fluid at this time.      Plan:  - trend trops to peak  - if develops chest pain - stat EKG, repeat trop  - hold ASA/brilinta in setting of ongoing GI bleed  - f/u echo  - hold lasix for bobbi    Elevated troponin level not due myocardial infarction  Troponin on admission 0.071. EKG only notable for prior infarct. No chest pain or SOB. Likely demand  ischemia.    Plan:  - trend trops to peak  - if develops chest pain - stat EKG, repeat trop    Anemia  Chronic anemia likely exacerbated by acute blood loss. Most recent hemoglobin and hematocrit are listed below.  Recent Labs     10/30/24  1946   HGB 8.8*   HCT 27.5*     Patient has not received any transfusions this admission, however required transfusions on recent admissions    Plan  - daily cbc  - Transfuse PRBC if patient becomes hemodynamically unstable, symptomatic or H/H drops below 7/21. Will transfuse earlier if gross hematochezia continues  - Patient's anemia is currently  being monitored      Rectal bleeding  See C. Diff colitis    Wound dehiscence, right ankle  Fell on 8/14 with resultant right ankle and hip fracture. Underwent surgical fixation and was discharged to rehab. On discharge from rehab R ankle wound dehisced. Underwent wound debridement during readmission from 9/24-10/7. Ankle remains wrapped and braced with severe pain relieved by robaxin. Notes shooting pain but previously was unable to tolerate gabapentin. On 6 week doxycycline course until 11/12 complicated by persistent C. Diff colitis.    Plan:  - consult wound care  - consult to ID for abx recs  - continue doxycycline course  - non-weight bearing until at least 11/1  - pain control with oxy 5 q8h prn      Stage 3b chronic kidney disease  Creatine stable for now. BMP reviewed- noted Estimated Creatinine Clearance: 21.8 mL/min (A) (based on SCr of 2.7 mg/dL (H)). according to latest data. Based on current GFR, CKD stage is stage 3 - GFR 30-59 with acute KYA.      Plan:  - Monitor UOP and serial BMP and adjust therapy as needed.   - Renally dose meds.   - Avoid nephrotoxic medications and procedures.  - hold lasix    Type 2 diabetes mellitus with stage 3b chronic kidney disease, with long-term current use of insulin  Last A1c 8.2 in July. On 12U lantus nightly with SSI at meals.     Plan:  - hold lantus while relatively euglycemic and  npo  - low dose SSI    KYA (acute kidney injury)  KYA is likely due to pre-renal azotemia due to intravascular volume depletion. Baseline creatinine is  1.5 . Most recent creatinine and eGFR are listed below.  Recent Labs     10/30/24  1946   CREATININE 2.7*   EGFRNORACEVR 18.0*     Likely due to acute blood loss     Plan  - KYA is  being monitored  - Avoid nephrotoxins and renally dose meds for GFR listed above  - Monitor urine output, daily cmp, and adjust therapy as needed  - hold home lasix  - 1L fluids over 10h    Mixed hyperlipidemia  Statin previously held for chronic elevation in LFTs. Will not resume at this time.    Primary hypertension  Patients blood pressure range in the last 24 hours was: BP  Min: 99/54  Max: 130/61.The patient's inpatient anti-hypertensive regimen is listed below:  Current Antihypertensives  hydrALAZINE tablet 100 mg, Every 8 hours, Oral  isosorbide mononitrate 24 hr tablet 60 mg, Daily, Oral  metoprolol succinate (TOPROL-XL) 24 hr tablet 25 mg, Daily, Oral    Plan  - BP is controlled, no changes needed to their regimen  - avoid nephrotoxic antihypertensives    Anxiety  Chronic poorly controlled anxiety requiring PRN ativan near daily. Exacerbated by ongoing health concerns.    Plan:  - continue home fluoxetine  - PRN ativan  - hold home seroquel for elevated qtc, trial of melatonin and daily ekg      VTE Risk Mitigation (From admission, onward)           Ordered     Reason for No Pharmacological VTE Prophylaxis  Once        Question:  Reasons:  Answer:  Active Bleeding    10/30/24 2302     IP VTE HIGH RISK PATIENT  Once         10/30/24 2302     Place sequential compression device  Until discontinued         10/30/24 2302                         Ayesha Buck MD  Department of Hospital Medicine  [facility] - Internal Medicine Telemetry

## 2024-10-31 NOTE — ASSESSMENT & PLAN NOTE
Patients blood pressure range in the last 24 hours was: BP  Min: 99/54  Max: 130/61.The patient's inpatient anti-hypertensive regimen is listed below:  Current Antihypertensives  hydrALAZINE tablet 100 mg, Every 8 hours, Oral  isosorbide mononitrate 24 hr tablet 60 mg, Daily, Oral  metoprolol succinate (TOPROL-XL) 24 hr tablet 25 mg, Daily, Oral    Plan  - BP is controlled, no changes needed to their regimen  - avoid nephrotoxic antihypertensives

## 2024-10-31 NOTE — PLAN OF CARE
David M[facility]ares - Internal Medicine Telemetry  Initial Discharge Assessment       Primary Care Provider: Jorge Paris MD    Admission Diagnosis: Fluid excess [E87.70]  Fatigue [R53.83]  Elevated troponin [R79.89]  Chest pain [R07.9]  Gastrointestinal hemorrhage, unspecified gastrointestinal hemorrhage type [K92.2]  Acute kidney injury superimposed on chronic kidney disease [N17.9, N18.9]  At risk for long QT syndrome [Z91.89]    Admission Date: 10/30/2024  Expected Discharge Date: 11/2/2024    Transition of Care Barriers: (P) None    Payor: [employer] MEDICARE / Plan: [employer] HMO PPO SPECIAL NEEDS / Product Type: Medicare Advantage /     Extended Emergency Contact Information  Primary Emergency Contact: RADHA MUÑIZ  Mobile Phone: [phone]  Relation: Daughter  Preferred language: English   needed? No  Secondary Emergency Contact: JAY JAY SMITH  Mobile Phone: [phone]  Relation: Daughter  Preferred language: English   needed? No    Discharge Plan A: (P) Home Health  Discharge Plan B: (P) Home Health      [facility] [address] [place] [address], [place] - [address]  [address]  [place] [address]  Phone: [phone] Fax: [phone]    [facility] [address]  Suite   [facility] [address]  Phone: [phone] Fax: [phone]      Initial Assessment (most recent)       Adult Discharge Assessment - 10/31/24 1109          Discharge Assessment    Assessment Type Discharge Planning Assessment (P)      Confirmed/corrected address, phone number and insurance Yes (P)      Confirmed Demographics Correct on Facesheet (P)      Source of Information patient (P)      When was your last doctors appointment? -- (P)    last week    Does patient/caregiver understand observation status Yes (P)      Communicated MIKHAIL with patient/caregiver Date not available/Unable to determine (P)      Reason For Admission C. diff (P)      People in Home grandchild(daniel) (P)       Facility Arrived From: n/a (P)      Do you expect to return to your current living situation? Yes (P)      Do you have help at home or someone to help you manage your care at home? Yes (P)      Who are your caregiver(s) and their phone number(s)? vick Ramirez, doesn't remember phone number (P)      Prior to hospitilization cognitive status: Unable to Assess (P)      Current cognitive status: -- (P)    Oriented, sleepy    Walking or Climbing Stairs Difficulty no (P)      Dressing/Bathing Difficulty no (P)      Home Layout Able to live on 1st floor (P)      Equipment Currently Used at Home hospital bed (P)      Readmission within 30 days? Yes (P)      Patient currently being followed by outpatient case management? Unable to determine (comments) (P)      Do you currently have service(s) that help you manage your care at home? Yes (P)      Name and Contact number of agency [facility] (P)      Is the pt/caregiver preference to resume services with current agency Yes (P)      Do you take prescription medications? Yes (P)      Do you have prescription coverage? Yes (P)      Coverage [employer] (P)      Do you have any problems affording any of your prescribed medications? No (P)      Is the patient taking medications as prescribed? yes (P)      Who is going to help you get home at discharge? She needs ambulance (P)      How do you get to doctors appointments? -- (P)    virtual    Are you on dialysis? No (P)      Do you take coumadin? No (P)      Discharge Plan A Home Health (P)      Discharge Plan B Home Health (P)      DME Needed Upon Discharge  none (P)      Discharge Plan discussed with: Patient (P)      Transition of Care Barriers None (P)         Physical Activity    On average, how many days per week do you engage in moderate to strenuous exercise (like a brisk walk)? 0 days (P)      On average, how many minutes do you engage in exercise at this level? 0 min (P)         Financial Resource Strain    How hard is it for  you to pay for the very basics like food, housing, medical care, and heating? Not hard at all (P)         Housing Stability    In the last 12 months, was there a time when you were not able to pay the mortgage or rent on time? No (P)      At any time in the past 12 months, were you homeless or living in a shelter (including now)? No (P)         Transportation Needs    Has the lack of transportation kept you from medical appointments, meetings, work or from getting things needed for daily living? No (P)         Food Insecurity    Within the past 12 months, you worried that your food would run out before you got the money to buy more. Never true (P)      Within the past 12 months, the food you bought just didn't last and you didn't have money to get more. Never true (P)         Stress    Do you feel stress - tense, restless, nervous, or anxious, or unable to sleep at night because your mind is troubled all the time - these days? Very much (P)         Social Isolation    How often do you feel lonely or isolated from those around you?  Never (P)         Alcohol Use    Q1: How often do you have a drink containing alcohol? Never (P)      Q2: How many drinks containing alcohol do you have on a typical day when you are drinking? Patient does not drink (P)      Q3: How often do you have six or more drinks on one occasion? Never (P)         Utilities    In the past 12 months has the electric, gas, oil, or water company threatened to shut off services in your home? No (P)         Health Literacy    How often do you need to have someone help you when you read instructions, pamphlets, or other written material from your doctor or pharmacy? Sometimes (P)         OTHER    Name(s) of People in Home vick Ramirez, doesn't remember phone number (P)                       CM spoke with pt in room.  DC plan home with [facility], ambulance transport.    1:53 PM  CM sent HH referral to [facility] via CP.    KAITLYNN CardenasN, BS, RN,  CCM      Discharge Plan A and Plan B have been determined by review of patient's clinical status, future medical and therapeutic needs, and coverage/benefits for post-acute care in coordination with multidisciplinary team members.

## 2024-10-31 NOTE — SUBJECTIVE & OBJECTIVE
Past Medical History:   Diagnosis Date    Allergy     Altered mental status 06/19/2022    DYSARTHRIA, SPASTIC MOVEMENTS & DIFFICULTY SWALLOWING    Anemia     Anxiety     Arthritis     Cataract     both removed    Colon polyps     Coronary artery disease     Depression     Diabetes mellitus, type II     Disorder of kidney and ureter     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/2023    Fibromyalgia     Follicular lymphoma     GERD (gastroesophageal reflux disease)     HTN (hypertension)     Hyperlipidemia     MI (myocardial infarction) 03/2019    Personal history of colonic polyps     Restless leg syndrome     Stroke        Past Surgical History:   Procedure Laterality Date    APPLICATION OF WOUND VACUUM-ASSISTED CLOSURE DEVICE Right 9/25/2024    Procedure: APPLICATION, WOUND VAC;  Surgeon: Neto Davalos MD;  Location: [facility] OR [address] [place] [address];  Service: Orthopedics;  Laterality: Right;    COLONOSCOPY  11/07/2012    Colon polyp found; repeat in 5 years    DEBRIDEMENT OF LOCAL FLAP Right 9/25/2024    Procedure: DEBRIDEMENT, LOCAL FLAP;  Surgeon: Neto Davalos MD;  Location: [facility] OR [address] [place] [address];  Service: Orthopedics;  Laterality: Right;    ELBOW SURGERY Right 2015    dislocation repair     ESOPHAGOGASTRODUODENOSCOPY  11/07/2012    atrophic gastritis, H pylori testing negative    INCISION AND DRAINAGE FOOT Right 6/2/2021    Procedure: INCISION AND DRAINAGE, FOOT, bone biopsy;  Surgeon: Quiana Penn DPM;  Location: [facility] OR;  Service: Podiatry;  Laterality: Right;    IRRIGATION AND DEBRIDEMENT OF LOWER EXTREMITY Right 9/25/2024    Procedure: IRRIGATION AND DEBRIDEMENT, LOWER EXTREMITY; slider table, supine, bone foam, cysto tubing, 6L NS/dakins/peroxide, culture swabs;  Surgeon: Neto Davalos MD;  Location: [facility] OR [address] [place] [address];  Service: Orthopedics;  Laterality: Right;    KNEE SURGERY Bilateral 2015    scoped    LEFT HEART CATHETERIZATION Left 3/29/2019    Procedure: Left heart cath;  Surgeon: Bladimir WHITING  MD Chelsey;  Location: [facility] CATH LAB;  Service: Cardiology;  Laterality: Left;    LEFT HEART CATHETERIZATION Left 11/18/2019    Procedure: Left heart cath;  Surgeon: Karl Rico MD;  Location: [facility] CATH LAB;  Service: Cardiology;  Laterality: Left;    LEFT HEART CATHETERIZATION Left 1/8/2020    Procedure: Left heart cath, right radial, noon start;  Surgeon: Christos Monreal MD;  Location: [facility] CATH LAB;  Service: Cardiology;  Laterality: Left;  RN Pre Op 1-6-20.  To be admitted 1-7-20 sor Aspirin Disensitation    OPEN REDUCTION AND INTERNAL FIXATION (ORIF) OF FRACTURE OF ACETABULUM Right 8/16/2024    Procedure: ORIF, FRACTURE, ACETABULUM;  Surgeon: Neto Davalos MD;  Location: [address];  Service: Orthopedics;  Laterality: Right;  anterior and lateral pelvic incisions    OPEN REDUCTION AND INTERNAL FIXATION (ORIF) OF PILON FRACTURE Right 8/14/2024    Procedure: ORIF, FRACTURE, PILON;  Surgeon: Neto Davalos MD;  Location: [address];  Service: Orthopedics;  Laterality: Right;    TONSILLECTOMY  1955    ULTRASOUND GUIDANCE  1/8/2020    Procedure: Ultrasound Guidance;  Surgeon: Christos Monreal MD;  Location: [facility] CATH LAB;  Service: Cardiology;;       Review of patient's allergies indicates:   Allergen Reactions    Novolin 70/30 (semi-synthetic) Nausea And Vomiting     Severe vomiting on Relion 70/30    Sulfa (sulfonamide antibiotics) Anaphylaxis    Talwin [pentazocine lactate] Anaphylaxis    Victoza [liraglutide] Nausea And Vomiting    Glipizide Nausea Only    Citric acid     Codeine     Influenza virus vaccines Hives    Iodine and iodide containing products Hives    Levetiracetam Itching    Neurontin [gabapentin]      Possible associated myoclonic jerk    Rituxan [rituximab] Hives    Zoloft [sertraline] Nausea And Vomiting       No current facility-administered medications on file prior to encounter.     Current Outpatient Medications on File Prior to Encounter   Medication Sig  "   aspirin 81 MG Chew Take 1 tablet (81 mg total) by mouth once daily. Hold to avoid bleed risk on triple therapy and then resume on oct 27 once eliquis stops on oct 26th    BRILINTA 90 mg tablet Take 1 tablet by mouth twice daily    DEXCOM G7  Misc Use 1  to track blood glucose, ICD10: E11.65    DEXCOM G7 SENSOR Samina Use 1 sensor every 10 days to track blood glucose, ICD10: E11.65, okay with 90 day supply if possible    doxycycline (VIBRA-TABS) 100 MG tablet Take 1 tablet (100 mg total) by mouth every 12 (twelve) hours. End date 11/7/24    FLUoxetine 40 MG capsule Take 1 capsule (40 mg total) by mouth once daily.    furosemide (LASIX) 40 MG tablet Take 1 tablet (40 mg total) by mouth 2 (two) times daily.    hydrALAZINE (APRESOLINE) 100 MG tablet Take 1 tablet (100 mg total) by mouth every 8 (eight) hours.    insulin aspart U-100 (NOVOLOG) 100 unit/mL (3 mL) InPn pen Inject 0-10 Units into the skin before meals and at bedtime as needed (Hyperglycemia).    insulin glargine U-100, Lantus, 100 unit/mL (3 mL) SubQ InPn pen Inject 12 Units into the skin every evening.    isosorbide mononitrate (IMDUR) 60 MG 24 hr tablet Take 1 tablet (60 mg total) by mouth once daily.    LORazepam (ATIVAN) 1 MG tablet TAKE 1 TABLET BY MOUTH ONCE DAILY AS NEEDED FOR ANXIETY    metoprolol succinate (TOPROL-XL) 25 MG 24 hr tablet Take 1 tablet (25 mg total) by mouth once daily.    ondansetron (ZOFRAN-ODT) 8 MG TbDL Dissolve 1 tablet (8 mg total) by mouth every 8 (eight) hours as needed (nausea).    oxyCODONE (ROXICODONE) 5 MG immediate release tablet Take 1 tablet (5 mg total) by mouth every 8 (eight) hours as needed (pain scale 4-6).    pen needle, diabetic (BD ULTRA-FINE SAGAR PEN NEEDLE) 32 gauge x 5/32" Ndle One pen needle use with insulin pen 4 times a day.  ICD-10: E11.9    QUEtiapine (SEROQUEL) 50 MG tablet Take 1 tablet (50 mg total) by mouth nightly as needed (insomnia).    ticagrelor (BRILINTA) 90 mg tablet Take 1 " tablet (90 mg total) by mouth 2 (two) times daily.    vancomycin (VANCOCIN) 125 MG capsule Take 1 capsule by mouth every 6 hours until October 9th then take twice daily until off IV antibiotics (11/7/24) then stop    albuterol (PROVENTIL/VENTOLIN HFA) 90 mcg/actuation inhaler Inhale 2 puffs into the lungs every 6 (six) hours as needed for Wheezing or Shortness of Breath.    aluminum & magnesium hydroxide-simethicone (MYLANTA MAX STRENGTH) 400-400-40 mg/5 mL suspension Take 30 mLs by mouth every 6 (six) hours as needed for Indigestion.    hydrOXYzine HCL (ATARAX) 25 MG tablet Take 1 tablet (25 mg total) by mouth 3 (three) times daily as needed for Itching.    OZEMPIC 1 mg/dose (4 mg/3 mL) Inject 1 mg into the skin every 7 days. HOLD while at [facility]     Family History       Problem Relation (Age of Onset)    Allergy (severe) Daughter    Cancer Mother, Father    Diabetes Sister    Heart disease Mother    Hypertension Sister    Lung cancer Brother    No Known Problems Daughter          Tobacco Use    Smoking status: Never    Smokeless tobacco: Never   Substance and Sexual Activity    Alcohol use: Not Currently    Drug use: Never    Sexual activity: Not Currently     Partners: Male     Review of Systems   Constitutional:  Positive for chills. Negative for fever.   HENT:  Negative for congestion.    Eyes:  Negative for visual disturbance.   Respiratory:  Negative for cough, chest tightness and shortness of breath.    Cardiovascular:  Positive for leg swelling (since discharge from rehab, not currently). Negative for chest pain and palpitations.   Gastrointestinal:  Positive for abdominal distention, abdominal pain (RLQ), blood in stool, diarrhea and nausea. Negative for constipation and rectal pain.   Genitourinary:  Negative for dysuria.   Musculoskeletal:  Positive for arthralgias (R ankle). Negative for back pain.   Skin:  Positive for wound (R ankle).   Neurological:  Positive for dizziness (when sitting up in bed in  morning for past few weeks), tremors and weakness.   Psychiatric/Behavioral:  Negative for confusion. The patient is nervous/anxious.         Distraught over current health conditions     Objective:     Vital Signs (Most Recent):  Temp: 98.1 °F (36.7 °C) (10/30/24 1909)  Pulse: 93 (10/30/24 2220)  Resp: 20 (10/30/24 2220)  BP: 130/61 (10/30/24 2220)  SpO2: 100 % (10/30/24 2220) Vital Signs (24h Range):  Temp:  [98.1 °F (36.7 °C)] 98.1 °F (36.7 °C)  Pulse:  [84-96] 93  Resp:  [20] 20  SpO2:  [100 %] 100 %  BP: ()/(50-61) 130/61     Weight: 86.2 kg (190 lb)  Body mass index is 27.26 kg/m².     Physical Exam  Constitutional:       General: She is in acute distress.      Appearance: She is ill-appearing.   Eyes:      General: No scleral icterus.     Extraocular Movements: Extraocular movements intact.      Conjunctiva/sclera: Conjunctivae normal.   Cardiovascular:      Rate and Rhythm: Normal rate and regular rhythm.      Pulses: Normal pulses.      Heart sounds: Murmur (systolic) heard.   Pulmonary:      Effort: Pulmonary effort is normal. No respiratory distress.      Breath sounds: Normal breath sounds.   Chest:      Chest wall: No tenderness.   Abdominal:      General: There is no distension.      Palpations: Abdomen is soft.      Tenderness: There is abdominal tenderness (RLQ). There is guarding.   Genitourinary:     Comments: No blood on pad  Musculoskeletal:         General: Tenderness present.      Left lower leg: No edema.      Comments: R ankle wrapped and in brace   Skin:     General: Skin is warm and dry.      Capillary Refill: Capillary refill takes less than 2 seconds.   Neurological:      General: No focal deficit present.      Mental Status: She is alert and oriented to person, place, and time.   Psychiatric:      Comments: anxious                Significant Labs: All pertinent labs within the past 24 hours have been reviewed.  Recent Lab Results         10/30/24  1946        Albumin 2.5       ALP  181       ALT 37       Anion Gap 13       PTT 28.9  Comment: Refer to local heparin nomogram for intensity/dose specific   therapeutic   range.         AST 51       Baso # 0.10       Basophil % 0.9       BILIRUBIN TOTAL 0.4  Comment: For infants and newborns, interpretation of results should be based  on gestational age, weight and in agreement with clinical  observations.    Premature Infant recommended reference ranges:  Up to 24 hours.............<8.0 mg/dL  Up to 48 hours............<12.0 mg/dL  3-5 days..................<15.0 mg/dL  6-29 days.................<15.0 mg/dL         BUN 67       Calcium 9.3       Chloride 100       CO2 23       Creatinine 2.7       Differential Method Automated       eGFR 18.0       Eos # 0.2       Eos % 2.1       Glucose 147       Gran # (ANC) 7.8       Gran % 70.6       Group & Rh O POS       Hematocrit 27.5       Hemoglobin 8.8       Immature Grans (Abs) 0.06  Comment: Mild elevation in immature granulocytes is non specific and   can be seen in a variety of conditions including stress response,   acute inflammation, trauma and pregnancy. Correlation with other   laboratory and clinical findings is essential.         Immature Granulocytes 0.5       INDIRECT JOAQUIN NEG       INR 1.0  Comment: Coumadin Therapy:  2.0 - 3.0 for INR for all indicators except mechanical heart valves  and antiphospholipid syndromes which should use 2.5 - 3.5.         Lymph # 1.7       Lymph % 15.5       MCH 27.9       MCHC 32.0       MCV 87       Mono # 1.1       Mono % 10.4       MPV 13.5       nRBC 0       Platelet Count 222       Potassium 4.2       PROTEIN TOTAL 6.5       PT 10.8       RBC 3.15       RDW 14.5       Sodium 136       Specimen Outdate 11/02/2024 23:59       Troponin I 0.071  Comment: The reference interval for Troponin I represents the 99th percentile   cutoff   for our facility and is consistent with 3rd generation assay   performance.         WBC 10.99               Significant  Imaging: I have reviewed all pertinent imaging results/findings within the past 24 hours.  Pending KUB

## 2024-10-31 NOTE — CONSULTS
[facility] - Internal Medicine Telemetry  Gastroenterology  Consult Note    Patient Name: Lorena Contreras  MRN: [id number]  Admission Date: 10/30/2024  Hospital Length of Stay: 0 days  Code Status: Partial Code   Attending Provider: Marita Haywood MD   Consulting Provider: Faviola Billingsley MD  Primary Care Physician: oJrge Paris MD  Principal Problem:C. difficile colitis    Inpatient consult to Gastroenterology  Consult performed by: Faviola Billingsley MD  Consult ordered by: Ayesha Buck MD        Subjective:     HPI:  HPI: Lorena Contreras is a 74y female with a pmhx of CKD, CAD (MI 2019), HTN, fibromyalgia, and lymphoma (s/p chemo, in remission) who presented today with large volume bright red blood with clots in her stool. She had an orthopedic procedure in August 2024(right hip and ankle fixation) following a fall with and subsequent wound dehiscence. She was found to have C. Diff colitis, discharged on a total of 6-week Doxy and Vanco.   Patient still having diarrhea, and developed nausea and vomiting 3 days prior to presentation.   2 days ago, she started to pass dark colored stool, and yesterday, she passed some heavier blood per rectum with some clots.She denies prior history of melena or hematochezia; Per patient, no family history of GI cancers comes to mind. Her typical diet is considered healthy  There's associated new-onset abdominal pain and chills. She currently has no fever, dizziness, or chest pain. On home Brilinta and ASA, and last dose of ASA and brilinta was yesterday morning.     She has been hemodynamically stable since on admission at ED.Hb= 8.6(down from 8.8 yesterday at the ED) BUN is 71(up from 67 yesterday at the ED), Cr=2.8(baseline is 1.5), AST= 43, ALT= 32, alp= 173, Troponin is 0.096(with no EKG changes). Abdominal X-ray revealed a non-obstructive  bowel gas pattern    Last EGD/colonoscopy was in 2012; Showing Atrophic gastritis on EGD. Colonoscopy revealed  a solitary benign Colonic polyp at the time, and she was due for another colonoscopy in 2017 and is yet to get one(she has avoided colonoscopy because she attributes the last one she had to her fecal incontinence).   GI consulted for new onset abdominal pain and hematochezia in the setting of c. Diff colitis.    Past Medical History:   Diagnosis Date    Allergy     Altered mental status 06/19/2022    DYSARTHRIA, SPASTIC MOVEMENTS & DIFFICULTY SWALLOWING    Anemia     Anxiety     Arthritis     Cataract     both removed    Colon polyps     Coronary artery disease     Depression     Diabetes mellitus, type II     Disorder of kidney and ureter     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/2023    Fibromyalgia     Follicular lymphoma     GERD (gastroesophageal reflux disease)     HTN (hypertension)     Hyperlipidemia     MI (myocardial infarction) 03/2019    Personal history of colonic polyps     Restless leg syndrome     Stroke        Past Surgical History:   Procedure Laterality Date    APPLICATION OF WOUND VACUUM-ASSISTED CLOSURE DEVICE Right 9/25/2024    Procedure: APPLICATION, WOUND VAC;  Surgeon: Neto Davalos MD;  Location: [facility] OR [address] [place] [address];  Service: Orthopedics;  Laterality: Right;    COLONOSCOPY  11/07/2012    Colon polyp found; repeat in 5 years    DEBRIDEMENT OF LOCAL FLAP Right 9/25/2024    Procedure: DEBRIDEMENT, LOCAL FLAP;  Surgeon: Neto Davalos MD;  Location: [facility] OR [address] [place] [address];  Service: Orthopedics;  Laterality: Right;    ELBOW SURGERY Right 2015    dislocation repair     ESOPHAGOGASTRODUODENOSCOPY  11/07/2012    atrophic gastritis, H pylori testing negative    INCISION AND DRAINAGE FOOT Right 6/2/2021    Procedure: INCISION AND DRAINAGE, FOOT, bone biopsy;  Surgeon: Quiana Penn DPM;  Location: [facility] OR;  Service: Podiatry;  Laterality: Right;    IRRIGATION AND DEBRIDEMENT OF LOWER EXTREMITY Right 9/25/2024    Procedure: IRRIGATION AND DEBRIDEMENT, LOWER EXTREMITY; slider table,  supine, bone foam, cysto tubing, 6L NS/dakins/peroxide, culture swabs;  Surgeon: Neto Davlaos MD;  Location: [facility] OR [address] [place] [address];  Service: Orthopedics;  Laterality: Right;    KNEE SURGERY Bilateral 2015    scoped    LEFT HEART CATHETERIZATION Left 3/29/2019    Procedure: Left heart cath;  Surgeon: Bladimir Barbosa MD;  Location: [facility] CATH LAB;  Service: Cardiology;  Laterality: Left;    LEFT HEART CATHETERIZATION Left 11/18/2019    Procedure: Left heart cath;  Surgeon: Karl Rico MD;  Location: [facility] CATH LAB;  Service: Cardiology;  Laterality: Left;    LEFT HEART CATHETERIZATION Left 1/8/2020    Procedure: Left heart cath, right radial, noon start;  Surgeon: Christos Monreal MD;  Location: [facility] CATH LAB;  Service: Cardiology;  Laterality: Left;  RN Pre Op 1-6-20.  To be admitted 1-7-20 sor Aspirin Disensitation    OPEN REDUCTION AND INTERNAL FIXATION (ORIF) OF FRACTURE OF ACETABULUM Right 8/16/2024    Procedure: ORIF, FRACTURE, ACETABULUM;  Surgeon: Neto Davalos MD;  Location: [address];  Service: Orthopedics;  Laterality: Right;  anterior and lateral pelvic incisions    OPEN REDUCTION AND INTERNAL FIXATION (ORIF) OF PILON FRACTURE Right 8/14/2024    Procedure: ORIF, FRACTURE, PILON;  Surgeon: Neto Davalos MD;  Location: [address];  Service: Orthopedics;  Laterality: Right;    TONSILLECTOMY  1955    ULTRASOUND GUIDANCE  1/8/2020    Procedure: Ultrasound Guidance;  Surgeon: Christos Monreal MD;  Location: [facility] CATH LAB;  Service: Cardiology;;       Review of patient's allergies indicates:   Allergen Reactions    Novolin 70/30 (semi-synthetic) Nausea And Vomiting     Severe vomiting on Relion 70/30    Sulfa (sulfonamide antibiotics) Anaphylaxis    Talwin [pentazocine lactate] Anaphylaxis    Victoza [liraglutide] Nausea And Vomiting    Glipizide Nausea Only    Citric acid     Codeine     Influenza virus vaccines Hives    Iodine and iodide containing products Hives     Levetiracetam Itching    Neurontin [gabapentin]      Possible associated myoclonic jerk    Rituxan [rituximab] Hives    Zoloft [sertraline] Nausea And Vomiting     Family History       Problem Relation (Age of Onset)    Allergy (severe) Daughter    Cancer Mother, Father    Diabetes Sister    Heart disease Mother    Hypertension Sister    Lung cancer Brother    No Known Problems Daughter          Tobacco Use    Smoking status: Never    Smokeless tobacco: Never   Substance and Sexual Activity    Alcohol use: Not Currently    Drug use: Never    Sexual activity: Not Currently     Partners: Male     Review of Systems   Constitutional:  Positive for chills. Negative for fever.   HENT:  Negative for congestion.    Eyes:  Negative for visual disturbance.   Respiratory:  Negative for cough, chest tightness and shortness of breath.    Cardiovascular:  Positive for leg swelling. Negative for chest pain and palpitations.   Gastrointestinal:  Positive for abdominal pain (RLQ), blood in stool, diarrhea and nausea. Negative for constipation and rectal pain.   Genitourinary:  Negative for dysuria.   Musculoskeletal:  Positive for arthralgias (R ankle). Negative for back pain.   Skin:  Positive for wound (R ankle).   Neurological:  Positive for dizziness (when sitting up in bed in morning for past few weeks), tremors and weakness.   Psychiatric/Behavioral:  Negative for confusion. The patient is nervous/anxious.         Distraught over current health conditions     Objective:     Vital Signs (Most Recent):  Temp: 98.1 °F (36.7 °C) (10/31/24 0456)  Pulse: 78 (10/31/24 0456)  Resp: 18 (10/31/24 0456)  BP: (!) 116/59 (10/31/24 0600)  SpO2: 95 % (10/31/24 0456) Vital Signs (24h Range):  Temp:  [98.1 °F (36.7 °C)] 98.1 °F (36.7 °C)  Pulse:  [78-96] 78  Resp:  [18-20] 18  SpO2:  [95 %-100 %] 95 %  BP: ()/(50-64) 116/59     Weight: 86.2 kg (190 lb) (10/31/24 0456)  Body mass index is 28.89 kg/m².      Intake/Output Summary (Last 24  hours) at 10/31/2024 0817  Last data filed at 10/31/2024 0525  Gross per 24 hour   Intake --   Output 0 ml   Net 0 ml       Lines/Drains/Airways       Peripheral Intravenous Line  Duration                  Peripheral IV - Single Lumen 10/30/24 2021 20 G Posterior;Right Forearm <1 day         Peripheral IV - Single Lumen 10/30/24 2021 22 G Anterior;Left Forearm <1 day                     Physical Exam  Constitutional:       General: She is in acute distress.      Appearance: She is ill-appearing.   Eyes:      General: No scleral icterus.     Extraocular Movements: Extraocular movements intact.      Conjunctiva/sclera: Conjunctivae normal.   Cardiovascular:      Rate and Rhythm: Normal rate and regular rhythm.      Pulses: Normal pulses.      Heart sounds: Murmur (systolic) heard.   Pulmonary:      Effort: Pulmonary effort is normal. No respiratory distress.      Breath sounds: Normal breath sounds.   Chest:      Chest wall: No tenderness.   Abdominal:      General: There is no distension.      Palpations: Abdomen is soft.      Tenderness: There is abdominal tenderness (RLQ). There is guarding. There is no rebound.   Genitourinary:     Comments: No blood on pad  Musculoskeletal:         General: Tenderness present.      Right lower leg: Edema present.      Left lower leg: No edema.      Comments: R ankle wrapped and in brace   Skin:     General: Skin is warm and dry.      Capillary Refill: Capillary refill takes less than 2 seconds.      Coloration: Skin is not jaundiced or pale.   Neurological:      General: No focal deficit present.      Mental Status: She is alert and oriented to person, place, and time.   Psychiatric:      Comments: anxious          Significant Labs:  CBC:   Recent Labs   Lab 10/30/24  1946 10/31/24  0218   WBC 10.99 10.31   HGB 8.8* 8.6*   HCT 27.5* 27.1*    229     CMP:   Recent Labs   Lab 10/31/24  0218   *   CALCIUM 9.0   ALBUMIN 2.4*   PROT 6.2      K 4.2   CO2 26     "  BUN 71*   CREATININE 2.8*   ALKPHOS 173*   ALT 32   AST 43*   BILITOT 0.3     Stool C. diff: No results for input(s): "CDIFFICILEAN", "CDIFFTOX" in the last 48 hours.  Stool Culture: No results for input(s): "STOOLCULTURE" in the last 48 hours.  Stool Ova/Cysts/Parasites: No results for input(s): "STLEXAMOCP" in the last 48 hours.  Stool Giardia/Crypto: No results for input(s): "GIARDIAANTIG", "CRSPAG" in the last 48 hours.  Stool WBCs: No results for input(s): "STOOLWBC" in the last 48 hours.    Significant Imaging:  Imaging results within the past 24 hours have been reviewed.  Assessment/Plan:     ID  * C. difficile colitis  #hematochezia    Patient recently dx with C. Diff colitis. Has been on PO vanc (scheduled to finish 11/7), as well as a 6 week course of doxycycline for a surgical ankle infection. She recently tapered the vanc from q6h to BID on 10/27 per outpatient ID. Despite adherence with antibiotic regimen, patient still endorses watery Bms every 2-3hrs.     Has been experiencing hematochezia since 10/29. Hb= 8.6. SAKSHI with dark red blood present. Abdominal XR without  c/f obstructive bowel gas pattern. On aspirin and brilinta. Last EGD/ colonoscopy in 2012, revealing atrophic gastritis as well as a benign colon polyp. Currently still has bloody bowel movements.    PLAN:  - In view of the infective process still ongoing, patient is not a good colonoscopy candidate at this time.  - Will recommend continued Abx treatment, and follow-up on ID recs.  - Trend Hgb and transfuse for goal Hgb > 7, unless otherwise indicated  - Hold all NSAIDs and anticoagulants, unless contraindicated  - Continue to monitor for additional episodes of hematochezia/melena  - Please notify GI team if there is significant change in patient's clinical status;  Will sign off        Thank you for your consult. I will sign off. Please contact us if you have any additional questions.    Faviola Billingsley MD  Gastroenterology  [address] - Internal Medicine Telemetry

## 2024-10-31 NOTE — PLAN OF CARE
Problem: Adult Inpatient Plan of Care  Goal: Plan of Care Review  Outcome: Progressing  Goal: Patient-Specific Goal (Individualized)  Outcome: Progressing  Goal: Absence of Hospital-Acquired Illness or Injury  Outcome: Progressing  Goal: Optimal Comfort and Wellbeing  Outcome: Progressing  Goal: Readiness for Transition of Care  Outcome: Progressing     Problem: Diabetes Comorbidity  Goal: Blood Glucose Level Within Targeted Range  Outcome: Progressing     Problem: Acute Kidney Injury/Impairment  Goal: Fluid and Electrolyte Balance  Outcome: Progressing  Goal: Improved Oral Intake  Outcome: Progressing  Goal: Effective Renal Function  Outcome: Progressing     Problem: Infection  Goal: Absence of Infection Signs and Symptoms  Outcome: Progressing     Problem: Wound  Goal: Optimal Coping  Outcome: Progressing  Goal: Optimal Functional Ability  Outcome: Progressing  Goal: Absence of Infection Signs and Symptoms  Outcome: Progressing  Goal: Improved Oral Intake  Outcome: Progressing  Goal: Optimal Pain Control and Function  Outcome: Progressing  Goal: Skin Health and Integrity  Outcome: Progressing  Goal: Optimal Wound Healing  Outcome: Progressing     Problem: Skin Injury Risk Increased  Goal: Skin Health and Integrity  Outcome: Progressing     Problem: Fall Injury Risk  Goal: Absence of Fall and Fall-Related Injury  Outcome: Progressing

## 2024-10-31 NOTE — HPI
HPI: Lorena Contreras is a 74y female with a pmhx of CKD, CAD (MI 2019), HTN, fibromyalgia, and lymphoma (s/p chemo, in remission) who presented today with large volume bright red blood with clots in her stool. She had an orthopedic procedure in August 2024(right hip and ankle fixation) following a fall with and subsequent wound dehiscence. She was found to have C. Diff colitis, discharged on a total of 6-week Doxy and Vanco.   Patient still having diarrhea, and developed nausea and vomiting 3 days prior to presentation.   2 days ago, she started to pass dark colored stool, and yesterday, she passed some heavier blood per rectum with some clots.She denies prior history of melena or hematochezia;  There's associated new-onset abdominal pain and chills. She currently has no fever, dizziness, or chest pain. On home Brilinta and ASA, and last dose of ASA and brilinta was yesterday morning.     She has been hemodynamically stable since on admission at ED.Hb= 8.6(down from 8.8 yesterday at the ED) BUN is 71(up from 67 yesterday at the ED), Cr=2.8(baseline is 1.5), AST= 43, ALT= 32, alp= 173, Troponin is 0.096(with no EKG changes). Abdominal X-ray revealed a non-obstructive  bowel gas pattern    Last EGD/colonoscopy was in 2012; Showing Atrophic gastritis on EGD. Colonoscopy revealed a solitary benign Colonic polyp at the time, and she was due for another colonoscopy in 2017 and is yet to get one(she has avoided colonoscopy because she attributes the last one she had to her fecal incontinence).   GI consulted for new onset abdominal pain and hematochezia in the setting of c. Diff colitis.

## 2024-10-31 NOTE — ASSESSMENT & PLAN NOTE
KYA is likely due to pre-renal azotemia due to intravascular volume depletion. Baseline creatinine is  1.5 . Most recent creatinine and eGFR are listed below.  Recent Labs     10/30/24  1946   CREATININE 2.7*   EGFRNORACEVR 18.0*     Likely due to acute blood loss     Plan  - KYA is  being monitored  - Avoid nephrotoxins and renally dose meds for GFR listed above  - Monitor urine output, daily cmp, and adjust therapy as needed  - hold home lasix  - 1L fluids over 10h

## 2024-10-31 NOTE — ED TRIAGE NOTES
Lorena Contreras, a 74 y.o. female presents to the ED w/ complaint of rectal bleeding. Pt arrives to ED via EMS with c/o bloody stools. Pt endorses bloody stools x2days which has worsened today. Pt recently had a fall which left her bedridden. Pt has been on antibiotics and reports c diff infection.     Triage note:  Chief Complaint   Patient presents with    Rectal Bleeding     Blood stools since yesterday, currently bedridden from fall, has broken pelvis and right leg. On antibiotics, currently has c diff     Review of patient's allergies indicates:   Allergen Reactions    Novolin 70/30 (semi-synthetic) Nausea And Vomiting     Severe vomiting on Relion 70/30    Sulfa (sulfonamide antibiotics) Anaphylaxis    Talwin [pentazocine lactate] Anaphylaxis    Victoza [liraglutide] Nausea And Vomiting    Glipizide Nausea Only    Citric acid     Codeine     Influenza virus vaccines Hives    Iodine and iodide containing products Hives    Levetiracetam Itching    Neurontin [gabapentin]      Possible associated myoclonic jerk    Rituxan [rituximab] Hives    Zoloft [sertraline] Nausea And Vomiting     Past Medical History:   Diagnosis Date    Allergy     Altered mental status 06/19/2022    DYSARTHRIA, SPASTIC MOVEMENTS & DIFFICULTY SWALLOWING    Anemia     Anxiety     Arthritis     Cataract     both removed    Colon polyps     Coronary artery disease     Depression     Diabetes mellitus, type II     Disorder of kidney and ureter     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/2023    Fibromyalgia     Follicular lymphoma     GERD (gastroesophageal reflux disease)     HTN (hypertension)     Hyperlipidemia     MI (myocardial infarction) 03/2019    Personal history of colonic polyps     Restless leg syndrome     Stroke

## 2024-10-31 NOTE — ASSESSMENT & PLAN NOTE
Diagnosed with C. Diff on admission from 9/24-10/7, likely obtained from outpatient rehab facility. She has had persistent watery diarrhea since discharge every 2-3 hours. Discharged on PO vancomycin until 11/7, but taper started early on 10/27 after she developed nausea and vomiting. N/V now resolved but noted a slightly bloody stool on 10/29 and gross bright red blood with clots on 10/30 prompting presentation to ED. She has been on a course of doxycycline as well for ankle surgical infection.    Plan:  - consult to GI for hematochezia evaluation  - consult to ID for abx recommendations  - continue BID PO vancomycin  - f/u C. Diff panel  - daily cbc/cmp  - no probiotics due to persistent glucose drops while taking them  - no zofran due to elevated qtc, can trial scopolamine patch if nauseous  - hold ASA/brilinta  - NPO

## 2024-10-31 NOTE — PLAN OF CARE
Inpatient Upgrade Note    Lorena Contreras has warranted treatment spanning two or more midnights of hospital level care for the management of GI bleed and KYA, C-Diff . She continues to require IV fluids, daily labs, further testing/imaging, monitoring of vital signs, medication adjustments, and further evaluation by consultants. Her condition is also complicated by the following comorbidities:  CKD, CAD (MI 2019), HTN, fibromyalgia, and lymphoma (s/p chemo, in remission) .

## 2024-10-31 NOTE — ASSESSMENT & PLAN NOTE
Prior MI in 2019. Remains on ASA/Brilinta. Was placed on warfarin post-op but has since stopped. Trop mildly elevated on admission. No acute EKG changes or chest pain.     Noted to have fluid retention in extremities since her fall. Started on 40mg lasix daily. Last echo from 11/2023. Never diagnosed with HF. No SOB or pitting edema indicating excess fluid at this time.      Plan:  - trend trops to peak  - if develops chest pain - stat EKG, repeat trop  - hold ASA/brilinta in setting of ongoing GI bleed  - f/u echo  - hold lasix for bobbi

## 2024-10-31 NOTE — ASSESSMENT & PLAN NOTE
Chronic anemia likely exacerbated by acute blood loss. Most recent hemoglobin and hematocrit are listed below.  Recent Labs     10/30/24  1946   HGB 8.8*   HCT 27.5*     Patient has not received any transfusions this admission, however required transfusions on recent admissions    Plan  - daily cbc  - Transfuse PRBC if patient becomes hemodynamically unstable, symptomatic or H/H drops below 7/21. Will transfuse earlier if gross hematochezia continues  - Patient's anemia is currently  being monitored

## 2024-11-01 LAB
ALBUMIN SERPL BCP-MCNC: 2.3 G/DL (ref 3.5–5.2)
ALP SERPL-CCNC: 148 U/L (ref 40–150)
ALT SERPL W/O P-5'-P-CCNC: 25 U/L (ref 10–44)
ANION GAP SERPL CALC-SCNC: 10 MMOL/L (ref 8–16)
AST SERPL-CCNC: 37 U/L (ref 10–40)
BASOPHILS # BLD AUTO: 0.07 K/UL (ref 0–0.2)
BASOPHILS NFR BLD: 0.9 % (ref 0–1.9)
BILIRUB SERPL-MCNC: 0.3 MG/DL (ref 0.1–1)
BUN SERPL-MCNC: 57 MG/DL (ref 8–23)
CALCIUM SERPL-MCNC: 8.6 MG/DL (ref 8.7–10.5)
CHLORIDE SERPL-SCNC: 103 MMOL/L (ref 95–110)
CO2 SERPL-SCNC: 22 MMOL/L (ref 23–29)
CREAT SERPL-MCNC: 2.3 MG/DL (ref 0.5–1.4)
DIFFERENTIAL METHOD BLD: ABNORMAL
EOSINOPHIL # BLD AUTO: 0.3 K/UL (ref 0–0.5)
EOSINOPHIL NFR BLD: 4 % (ref 0–8)
ERYTHROCYTE [DISTWIDTH] IN BLOOD BY AUTOMATED COUNT: 14.3 % (ref 11.5–14.5)
EST. GFR  (NO RACE VARIABLE): 21.8 ML/MIN/1.73 M^2
GLUCOSE SERPL-MCNC: 117 MG/DL (ref 70–110)
HCT VFR BLD AUTO: 24.1 % (ref 37–48.5)
HGB BLD-MCNC: 7.6 G/DL (ref 12–16)
IMM GRANULOCYTES # BLD AUTO: 0.05 K/UL (ref 0–0.04)
IMM GRANULOCYTES NFR BLD AUTO: 0.7 % (ref 0–0.5)
LYMPHOCYTES # BLD AUTO: 1.3 K/UL (ref 1–4.8)
LYMPHOCYTES NFR BLD: 16.7 % (ref 18–48)
MAGNESIUM SERPL-MCNC: 1.8 MG/DL (ref 1.6–2.6)
MCH RBC QN AUTO: 28.4 PG (ref 27–31)
MCHC RBC AUTO-ENTMCNC: 31.5 G/DL (ref 32–36)
MCV RBC AUTO: 90 FL (ref 82–98)
MONOCYTES # BLD AUTO: 0.8 K/UL (ref 0.3–1)
MONOCYTES NFR BLD: 10 % (ref 4–15)
NEUTROPHILS # BLD AUTO: 5.1 K/UL (ref 1.8–7.7)
NEUTROPHILS NFR BLD: 67.7 % (ref 38–73)
NRBC BLD-RTO: 0 /100 WBC
OHS QRS DURATION: 82 MS
OHS QTC CALCULATION: 465 MS
PHOSPHATE SERPL-MCNC: 4.4 MG/DL (ref 2.7–4.5)
PLATELET # BLD AUTO: 167 K/UL (ref 150–450)
PMV BLD AUTO: 13.7 FL (ref 9.2–12.9)
POCT GLUCOSE: 162 MG/DL (ref 70–110)
POTASSIUM SERPL-SCNC: 3.6 MMOL/L (ref 3.5–5.1)
PROT SERPL-MCNC: 5.7 G/DL (ref 6–8.4)
RBC # BLD AUTO: 2.68 M/UL (ref 4–5.4)
SODIUM SERPL-SCNC: 135 MMOL/L (ref 136–145)
WBC # BLD AUTO: 7.47 K/UL (ref 3.9–12.7)

## 2024-11-01 PROCEDURE — 25000003 PHARM REV CODE 250: Mod: HCNC | Performed by: STUDENT IN AN ORGANIZED HEALTH CARE EDUCATION/TRAINING PROGRAM

## 2024-11-01 PROCEDURE — 21400001 HC TELEMETRY ROOM: Mod: HCNC

## 2024-11-01 PROCEDURE — 93005 ELECTROCARDIOGRAM TRACING: CPT | Mod: HCNC

## 2024-11-01 PROCEDURE — 25000003 PHARM REV CODE 250: Mod: HCNC

## 2024-11-01 PROCEDURE — 27000207 HC ISOLATION: Mod: HCNC

## 2024-11-01 PROCEDURE — 93010 ELECTROCARDIOGRAM REPORT: CPT | Mod: HCNC,,, | Performed by: INTERNAL MEDICINE

## 2024-11-01 PROCEDURE — 83735 ASSAY OF MAGNESIUM: CPT | Mod: HCNC

## 2024-11-01 PROCEDURE — 84100 ASSAY OF PHOSPHORUS: CPT | Mod: HCNC

## 2024-11-01 PROCEDURE — 99233 SBSQ HOSP IP/OBS HIGH 50: CPT | Mod: HCNC,,, | Performed by: STUDENT IN AN ORGANIZED HEALTH CARE EDUCATION/TRAINING PROGRAM

## 2024-11-01 PROCEDURE — 36415 COLL VENOUS BLD VENIPUNCTURE: CPT | Mod: HCNC

## 2024-11-01 PROCEDURE — 80053 COMPREHEN METABOLIC PANEL: CPT | Mod: HCNC

## 2024-11-01 PROCEDURE — 85025 COMPLETE CBC W/AUTO DIFF WBC: CPT | Mod: HCNC

## 2024-11-01 RX ORDER — HYDROXYZINE HYDROCHLORIDE 25 MG/1
25 TABLET, FILM COATED ORAL 3 TIMES DAILY PRN
Status: DISCONTINUED | OUTPATIENT
Start: 2024-11-01 | End: 2024-11-05 | Stop reason: HOSPADM

## 2024-11-01 RX ORDER — ONDANSETRON 4 MG/1
4 TABLET, FILM COATED ORAL ONCE
Status: COMPLETED | OUTPATIENT
Start: 2024-11-01 | End: 2024-11-03

## 2024-11-01 RX ORDER — ACETAMINOPHEN 325 MG/1
650 TABLET ORAL EVERY 6 HOURS PRN
Status: DISCONTINUED | OUTPATIENT
Start: 2024-11-01 | End: 2024-11-05 | Stop reason: HOSPADM

## 2024-11-01 RX ORDER — OXYCODONE HYDROCHLORIDE 5 MG/1
5 TABLET ORAL EVERY 8 HOURS PRN
Status: DISCONTINUED | OUTPATIENT
Start: 2024-11-01 | End: 2024-11-05 | Stop reason: HOSPADM

## 2024-11-01 RX ADMIN — ACETAMINOPHEN 650 MG: 325 TABLET ORAL at 07:11

## 2024-11-01 RX ADMIN — METHOCARBAMOL 500 MG: 500 TABLET ORAL at 11:11

## 2024-11-01 RX ADMIN — OXYCODONE 5 MG: 5 TABLET ORAL at 02:11

## 2024-11-01 RX ADMIN — ISOSORBIDE MONONITRATE 60 MG: 60 TABLET, EXTENDED RELEASE ORAL at 10:11

## 2024-11-01 RX ADMIN — FLUOXETINE HYDROCHLORIDE 40 MG: 20 CAPSULE ORAL at 10:11

## 2024-11-01 RX ADMIN — Medication 6 MG: at 09:11

## 2024-11-01 RX ADMIN — FIDAXOMICIN 200 MG: 200 TABLET, FILM COATED ORAL at 10:11

## 2024-11-01 RX ADMIN — DOXYCYCLINE HYCLATE 100 MG: 100 TABLET, COATED ORAL at 10:11

## 2024-11-01 RX ADMIN — METOPROLOL SUCCINATE 25 MG: 25 TABLET, EXTENDED RELEASE ORAL at 10:11

## 2024-11-01 RX ADMIN — DOXYCYCLINE HYCLATE 100 MG: 100 TABLET, COATED ORAL at 09:11

## 2024-11-01 RX ADMIN — HYDROXYZINE HYDROCHLORIDE 25 MG: 25 TABLET, FILM COATED ORAL at 11:11

## 2024-11-01 NOTE — ASSESSMENT & PLAN NOTE
Diagnosed with C. Diff on admission from 9/24-10/7, likely obtained from outpatient rehab facility. She has had persistent watery diarrhea since discharge every 2-3 hours. Discharged on PO vancomycin until 11/7, but taper started early on 10/27 after she developed nausea and vomiting. N/V now resolved but noted a slightly bloody stool on 10/29 and gross bright red blood with clots on 10/30 prompting presentation to ED. She has been on a course of doxycycline as well for ankle surgical infection.    Plan:  - Cdiff panel negative. ID following, appreciate recs  - Started on Fidoxamicin but d/c on 11/1/24 due to negative panel  - consult to GI for hematochezia evaluation, appreciate recs  - Hold off on colonoscopy in the setting of acute infection  - Will monitor over the weekend and re-evaluate for possible colonoscopy on 11/4.   - daily cbc/cmp  - no probiotics due to persistent glucose drops while taking them  - no zofran due to elevated qtc, can trial scopolamine patch if nauseous  - hold ASA/brilinta

## 2024-11-01 NOTE — SUBJECTIVE & OBJECTIVE
Interval History: Pt with 1 bloody bowel movement overnight and 1 this AM. Acute Hgb drop today. She denies any abdominal pain, CP, SOB. Discussing with GI about possible colonoscopy.     Review of Systems   Constitutional:  Negative for chills, fatigue and unexpected weight change.   HENT:  Negative for congestion, sinus pain and sore throat.    Respiratory:  Negative for cough, chest tightness and shortness of breath.    Cardiovascular:  Negative for chest pain, palpitations and leg swelling.   Gastrointestinal:  Negative for abdominal pain, diarrhea, nausea and vomiting.     Objective:     Vital Signs (Most Recent):  Temp: 98.9 °F (37.2 °C) (11/01/24 1151)  Pulse: 107 (11/01/24 1151)  Resp: 18 (11/01/24 1151)  BP: (!) 156/78 (11/01/24 1151)  SpO2: 95 % (11/01/24 1151) Vital Signs (24h Range):  Temp:  [97.6 °F (36.4 °C)-99.1 °F (37.3 °C)] 98.9 °F (37.2 °C)  Pulse:  [] 107  Resp:  [16-18] 18  SpO2:  [95 %-99 %] 95 %  BP: (100-156)/(57-78) 156/78     Weight: 86 kg (189 lb 9.5 oz)  Body mass index is 28.83 kg/m².    Intake/Output Summary (Last 24 hours) at 11/1/2024 1523  Last data filed at 10/31/2024 2100  Gross per 24 hour   Intake 200 ml   Output --   Net 200 ml         Physical Exam  Constitutional:       General: She is not in acute distress.     Appearance: Normal appearance. She is ill-appearing.   HENT:      Head: Normocephalic.      Right Ear: External ear normal.      Left Ear: External ear normal.      Mouth/Throat:      Mouth: Mucous membranes are moist.      Pharynx: Oropharynx is clear.   Eyes:      General: No scleral icterus.     Extraocular Movements: Extraocular movements intact.   Pulmonary:      Effort: Pulmonary effort is normal.   Musculoskeletal:      Right lower leg: No edema.      Left lower leg: No edema.      Comments: R ankle wrapped in brace   Skin:     General: Skin is warm.      Coloration: Skin is pale. Skin is not jaundiced.      Findings: No erythema or rash.   Neurological:       General: No focal deficit present.      Mental Status: She is alert and oriented to person, place, and time. Mental status is at baseline.   Psychiatric:         Mood and Affect: Mood normal.         Behavior: Behavior normal.         Thought Content: Thought content normal.             Significant Labs: All pertinent labs within the past 24 hours have been reviewed.    Significant Imaging: I have reviewed all pertinent imaging results/findings within the past 24 hours.

## 2024-11-01 NOTE — ASSESSMENT & PLAN NOTE
Lab Results   Component Value Date    HGBA1C 8.2 (H) 07/10/2024     Home regimen: On 12U lantus nightly with SSI at meals.      - Hold oral anti-glycemics while hospitalized  - low dose SSI  - Accu-checks q AC/HS  - Goal glucose 140-180  - Titrate insulin regimen as needed.   - Diabetic diet as tolerated  - Hypoglycemia protocol in place  - If blood glucose greater than 300, please ask patient not to eat food or drink anything other than water until correctional insulin has brought it back below 250  - If patient is NPO, please hold pre-meal Aspart (Daily Detemir/basal insulin is ok to give even if patient is NPO)

## 2024-11-01 NOTE — PROGRESS NOTES
[facility] - Internal Medicine Telemetry  Infectious Disease  Progress Note    Patient Name: Lorena Contreras  MRN: [id number]  Admission Date: 10/30/2024  Length of Stay: 1 days  Attending Physician: Marita Haywood MD  Primary Care Provider: Jorge Paris MD    Isolation Status: Special Contact  Assessment/Plan:      ID  * C. difficile colitis  Lorena Contreras is a 74 year old woman recently admitted for R tibial, fibular and R acetabular fracture requiring ORIF with recovery complicated by wound dehiscence and hardware exposure. Underwent debridement, cultures and pathology negative. However given that she had hardware exposure plan was to continue empiric antibiotics (doxy) and subsequently developed c. Diff on 9/28. Plan was to remain on PO vanc for entire course of doxycycline. She decreased her c. Diff dose to twice daily and the day after developed bloody diarrhea. Such a rapid worsening would be unusual. Repeat C. Diff testing on 10/31 was actually negative so unlikely to represent recurrence of c. Diff. She is on ticagrelor and aspirin. Discussed with GI who will consider colonoscopy if she does not have improvement over weekend.     Recommendations:  - stop fidaxomicin given no evidence of c. Diff recurrence  - start PO vanc 125 q24 hours for suppression until 11/12  - continue PO doxycycline 100 mg BID until 11/7            Above discussed with primary team.     Time: 50 minutes   50% of time spent on face-to-face counseling and coordination of care. Counseling included review of test results, diagnosis, and treatment plan with patient and/or family.  I have reviewed hospital notes from  service and other specialty providers as well as outside medical records. I have also reviewed CBC, CMP/BMP,  cultures and imaging with my interpretation as documented. Patient is high risk of morbidity, on antibiotics requiring intensive monitoring for toxicity.       Anticipated Disposition: TBD    Thank you  for your consult. I will sign off. Please contact us if you have any additional questions.    Annamaria Valladares MD  Infectious Disease  [facility] - Internal Medicine Telemetry    Subjective:     Principal Problem:C. difficile colitis    HPI: Lorena Contreras is a 74-year-old woman with Fibromyalgia, lymphoma s/p chemo, HTN, HLD, DMT2, CKD3b, stroke, MI / CAD s/p PCI, and HFpEF who was admitted for hematochezia of large volume with blood clots. ID is consulted for antibiotic recommendations in the setting of persistent C dif.    She was seen for ankle fracture, on discharge from SNF her ankle wound dehisced requiring surgical debridement and a 6-week course of doxycycline. At time of that admission she was found to have C. diff colitis. She was discharged on PO vancomycin until completion of doxycyline course. Her diarrhea is still occurring every 2-3 hours and 3 days ago she developed nausea and vomiting. Her outpatient ID physician advised her to taper her PO vancomycin from q6h to BID. The day PTA, her daughter noticed a darkening of her stools and today she was noted to have a pad filled with bright red blood and clots. While in the ED she developed abdominal pain localized to RLQ. No further bowel movements since and has remained NPO. She notes chills, anxiety, ankle pain, lightheadedness and RLQ pain but denies any fevers, changes in vision, chest pains, vomiting, or extremity swelling. Last dose of ASA and brilinta was this morning.      In the ED she was hemodynamically stable with a hgb of 8.8 near her baseline. BUN 67 and Cr 2.7 with baseline of 1.5. AST 51, ALT 37, ALKP 181 all at baseline. Troponin mildly elevated at 0.071. EKG with no acute changes.   Interval History: still having soft dark bloody stools. Had episode of emesis this AM. No ongoing abdominal pain.     Review of Systems   Constitutional:  Positive for fatigue. Negative for chills and fever.   Gastrointestinal:  Positive for nausea and  vomiting.   Skin:  Positive for wound.     Objective:     Vital Signs (Most Recent):  Temp: 98.9 °F (37.2 °C) (11/01/24 1151)  Pulse: 107 (11/01/24 1151)  Resp: 18 (11/01/24 1151)  BP: (!) 156/78 (11/01/24 1151)  SpO2: 95 % (11/01/24 1151) Vital Signs (24h Range):  Temp:  [97.6 °F (36.4 °C)-99.1 °F (37.3 °C)] 98.9 °F (37.2 °C)  Pulse:  [] 107  Resp:  [16-18] 18  SpO2:  [95 %-99 %] 95 %  BP: (100-156)/(57-78) 156/78     Weight: 86 kg (189 lb 9.5 oz)  Body mass index is 28.83 kg/m².    Estimated Creatinine Clearance: 24.6 mL/min (A) (based on SCr of 2.3 mg/dL (H)).     Physical Exam  Vitals and nursing note reviewed.   Constitutional:       Appearance: Normal appearance. She is not ill-appearing.   HENT:      Head: Normocephalic.   Pulmonary:      Effort: Pulmonary effort is normal. No respiratory distress.   Abdominal:      General: There is no distension.      Palpations: Abdomen is soft.      Tenderness: There is no abdominal tenderness.   Musculoskeletal:      Comments: Ankle surgical site healing well   Neurological:      Mental Status: She is alert.   Psychiatric:         Mood and Affect: Mood normal.         Behavior: Behavior normal.          Significant Labs:   Microbiology Results (last 7 days)       Procedure Component Value Units Date/Time    Clostridium difficile EIA [[id number]] Collected: 10/31/24 1359    Order Status: Completed Specimen: Stool Updated: 10/31/24 2231     C. diff Antigen Negative     C difficile Toxins A+B, EIA Negative     Comment: Testing not recommended for children <24 months old.       Narrative:      1 - 3 Days: If liquid stool unable to be collected in  community onset window (hospital day 1 - 3), the order will  be discontinued automatically. Do not send formed stool for  testing            Significant Imaging: I have reviewed all pertinent imaging results/findings within the past 24 hours.

## 2024-11-01 NOTE — PROGRESS NOTES
[facility] - Internal Medicine Telemetry  Wound Care    Patient Name:  Lorena Contreras   MRN:  [id number]  Date: 11/1/2024  Diagnosis: C. difficile colitis    History:     Past Medical History:   Diagnosis Date    Allergy     Altered mental status 06/19/2022    DYSARTHRIA, SPASTIC MOVEMENTS & DIFFICULTY SWALLOWING    Anemia     Anxiety     Arthritis     Cataract     both removed    Colon polyps     Coronary artery disease     Depression     Diabetes mellitus, type II     Disorder of kidney and ureter     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/2023    Fibromyalgia     Follicular lymphoma     GERD (gastroesophageal reflux disease)     HTN (hypertension)     Hyperlipidemia     MI (myocardial infarction) 03/2019    Personal history of colonic polyps     Restless leg syndrome     Stroke        Social History     Socioeconomic History    Marital status:     Number of children: 2   Occupational History    Occupation: house wife    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  2 dtr.  Lives with .  3 cats and a dog.  Retired.  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  Lives in house, 1 story and 4 steps up and has a ramp.      Enjoys crafting.  Unable to bowl due to myalgias.       Social Drivers of Health     Financial Resource Strain: Low Risk  (10/31/2024)    Overall Financial Resource Strain (CARDIA)     Difficulty of Paying Living Expenses: Not hard at all   Recent Concern: Financial Resource Strain - Medium Risk (10/31/2024)    Overall Financial Resource Strain (CARDIA)     Difficulty of Paying Living Expenses: Somewhat hard   Food Insecurity: No Food Insecurity (10/31/2024)    Hunger Vital Sign     Worried About Running Out of Food in the Last Year: Never true     Ran Out  of Food in the Last Year: Never true   Transportation Needs: No Transportation Needs (10/31/2024)    TRANSPORTATION NEEDS     Transportation : No   Physical Activity: Inactive (10/31/2024)    Exercise Vital Sign     Days of Exercise per Week: 0 days     Minutes of Exercise per Session: 0 min   Stress: Stress Concern Present (10/31/2024)    [employer] [place] [employer] - Occupational Stress Questionnaire     Feeling of Stress : Very much   Housing Stability: Low Risk  (10/31/2024)    Housing Stability Vital Sign     Unable to Pay for Housing in the Last Year: No     Homeless in the Last Year: No       Precautions:     Allergies as of 10/30/2024 - Reviewed 10/30/2024   Allergen Reaction Noted    Novolin 70/30 (semi-synthetic) Nausea And Vomiting 07/18/2016    Sulfa (sulfonamide antibiotics) Anaphylaxis 01/23/2014    Talwin [pentazocine lactate] Anaphylaxis 01/23/2014    Victoza [liraglutide] Nausea And Vomiting 07/30/2015    Glipizide Nausea Only 11/04/2016    Citric acid  01/23/2014    Codeine  01/23/2014    Influenza virus vaccines Hives 06/01/2021    Iodine and iodide containing products Hives 10/25/2017    Levetiracetam Itching 04/28/2023    Neurontin [gabapentin]  08/20/2024    Rituxan [rituximab] Hives 01/23/2014    Zoloft [sertraline] Nausea And Vomiting 01/23/2014       [facility] Assessment Details/Treatment   Patient seen for wound care consultation.  Chart reviewed for this encounter.  See flow sheet for findings.     Pt in bed and agreeable to care. Partial thickness skin loss to the left heel with a pink and moist wound bed. The wound is very tender to touch. The right ankle incision is intact, pink, dry and approximated. The wound was cleansed with NS, a bordered island dressing applied, and loosely secured with cast padding and an elastic bandage. Per patient, she is to wear the immobilizer boot at all times.     Recommendations:  - Left heel: bedside nursing to cleanse with NS, pat dry and apply a  foam dressing every 3 days  - Right ankle incision: bedside nursing to cleanse with NS, pat dry, apply a bordered island dressing to the wound, cover with cast padding and loosely secure with an elastic bandage every 3 days  - Buttocks: bedside nursing to cleanse with bath wipes, pat dry and apply zinc barrier cream (orange top) bid/prn; no dressings   - Turning every 2 hours  - Waffle mattress overlay   - Pillows used to lift heels off of the mattress      11/01/24 1009        Wound 08/14/24 1432 Incision Right Ankle vertical   Date First Assessed/Time First Assessed: 08/14/24 1432   Primary Wound Type: Incision  Side: Right  Location: Ankle  Incision Type: vertical  Closure Method: Sutures;Dermabond  Additional Comments: Incisions x3 to right ankle approximated with sutures...   Wound Image    Incision WDL WDL   Dressing Appearance Open to air   Drainage Amount None   Appearance Intact;Pink;Dry   Periwound Area Intact;Dry   Wound Edges Approximated   Care Cleansed with:;Sterile normal saline   Dressing Applied;Island/border;Cast padding;Elastic bandage        Wound 11/01/24 1009 Pressure Injury Left Heel   Date First Assessed/Time First Assessed: 11/01/24 1009   Present on Original Admission: Yes  Primary Wound Type: Pressure Injury  Side: Left  Location: Heel   Wound Image    Pressure Injury Stage 2   Dressing Appearance Open to air   Drainage Amount None   Appearance Pink;Moist   Tissue loss description Partial thickness   Periwound Area Intact;Dry     Recommendations made to primary team for above plan via secure chat. Wound care will follow-up as needed.     11/01/2024

## 2024-11-01 NOTE — HOSPITAL COURSE
Admitted to Hospital Medicine for further evaluation and management of C.Diff colitis.  Placed in the proper contact precautions. Patient's doxycycline was continued from previous regimen.GI consulted for evaluation of lower GI bleed. Plan for colonoscopy Monday. ID consulted. Patient was initially continued on her Vancomycin but then transitioned to Fidaxomicin on 10/31. Cdiff panel negative. Fidaxomicin was discontinued and patient was started back on PO Vancomycin 125mg daily for secondary prophylaxis while on the Doxycycline. EGD and colonoscopy performed. EGD without any blood. Colonoscopy showed friable mucosa throughout the entire colon. Random biopsies taken to rule out microscopic colitis. Also had a few polyps removed. Patient without any additional bloody bowel movements and is stable for discharge to home with home health.

## 2024-11-01 NOTE — ASSESSMENT & PLAN NOTE
KYA is likely due to pre-renal azotemia due to intravascular volume depletion. Baseline creatinine is  1.5 . Most recent creatinine and eGFR are listed below.  Recent Labs     10/30/24  1946 10/31/24  0218 11/01/24  0526   CREATININE 2.7* 2.8* 2.3*   EGFRNORACEVR 18.0* 17.2* 21.8*       Likely pre-renal in the setting of acute blood loss     Plan  - KYA is  being monitored . Improving.   - Avoid nephrotoxins and renally dose meds for GFR listed above  - Monitor urine output, daily cmp, and adjust therapy as needed  - hold home lasix   Detail Level: Zone Detail Level: Detailed

## 2024-11-01 NOTE — ASSESSMENT & PLAN NOTE
Lorena Contreras is a 74 year old woman recently admitted for R tibial, fibular and R acetabular fracture requiring ORIF with recovery complicated by wound dehiscence and hardware exposure. Underwent debridement, cultures and pathology negative. However given that she had hardware exposure plan was to continue empiric antibiotics (doxy) and subsequently developed c. Diff on 9/28. Plan was to remain on PO vanc for entire course of doxycycline. She decreased her c. Diff dose to twice daily and the day after developed bloody diarrhea. Such a rapid worsening would be unusual. Repeat C. Diff testing on 10/31 was actually negative so unlikely to represent recurrence of c. Diff. She is on ticagrelor and aspirin. Discussed with GI who will consider colonoscopy if she does not have improvement over weekend.     Recommendations:  - stop fidaxomicin given no evidence of c. Diff recurrence  - start PO vanc 125 q24 hours for suppression until 11/12  - continue PO doxycycline 100 mg BID until 11/7

## 2024-11-01 NOTE — PROGRESS NOTES
[facility]ares - Internal Medicine [facility]  Progress Note    Patient Name: Lorena Contreras  MRN: [id number]  Patient Class: IP- Inpatient   Admission Date: 10/30/2024  Length of Stay: 1 days  Attending Physician: Marita Haywood MD  Primary Care Provider: Jorge Paris MD        Subjective:     Principal Problem:C. difficile colitis        HPI:  Lorena Contreras is a 74y female with a pmhx of CKD, CAD (MI 2019), HTN, fibromyalgia, and lymphoma (s/p chemo, in remission) who presented today with large volume bright red blood with clots in her stool. In August she fell injuring her right hip and ankle which required surgical fixation. On discharge from SNF her ankle wound dehisced requiring surgical debridement. At time of that admission she was found to have c. Diff colitis. She was discharged on a 6 week course of doxycycline and PO vancomycin until completion of doxycyline course. Her diarrhea is still occurring every 2-3 hours and 3 days ago she developed nausea and vomiting. Her outpatient ID physician advised her to taper her PO vancomycin from q6h to BID. Yesterday her daughter noticed a darkening of her stools and today she was noted to have a pad filled with bright red blood and clots. While in the ED she developed abdominal pain localized to RLQ. No further bowel movements since and has remained NPO. She notes chills, anxiety, ankle pain, and new abdominal pain but denies any fevers, current dizziness, changes in vision, chest pains, vomiting, or extremity swelling. She reports feeling dizzy when arising in the morning for the past few weeks. Last dose of ASA and brilinta was this morning.     In the ED she was hemodynamically stable with a hgb of 8.8 near her baseline. BUN 67 and Cr 2.7 with baseline of 1.5. AST 51, ALT 37, ALKP 181 all at baseline. Troponin mildly elevated at 0.071. EKG with no acute changes.         Overview/Hospital Course:  Admitted to Hospital Medicine for  further evaluation and management of C.Diff colitis.  Placed in the proper contact precautions. Patient's doxycycline was continued from previous regimen.GI consulted for evaluation of lower GI bleed. In the setting acute infection, will hold off on colonoscopy at this time. ID consulted. Patient was initially continued on her Vancomycin but then transitioned to Fidaxomicin on 10/31. Cdiff panel negative. Fidaxomicin was discontinued and patient was started back on PO Vancomycin 125mg daily for secondary prophylaxis while on the Doxycycline.     Interval History: Pt with 1 bloody bowel movement overnight and 1 this AM. Acute Hgb drop today. She denies any abdominal pain, CP, SOB. Discussing with GI about possible colonoscopy.     Review of Systems   Constitutional:  Negative for chills, fatigue and unexpected weight change.   HENT:  Negative for congestion, sinus pain and sore throat.    Respiratory:  Negative for cough, chest tightness and shortness of breath.    Cardiovascular:  Negative for chest pain, palpitations and leg swelling.   Gastrointestinal:  Negative for abdominal pain, diarrhea, nausea and vomiting.     Objective:     Vital Signs (Most Recent):  Temp: 98.9 °F (37.2 °C) (11/01/24 1151)  Pulse: 107 (11/01/24 1151)  Resp: 18 (11/01/24 1151)  BP: (!) 156/78 (11/01/24 1151)  SpO2: 95 % (11/01/24 1151) Vital Signs (24h Range):  Temp:  [97.6 °F (36.4 °C)-99.1 °F (37.3 °C)] 98.9 °F (37.2 °C)  Pulse:  [] 107  Resp:  [16-18] 18  SpO2:  [95 %-99 %] 95 %  BP: (100-156)/(57-78) 156/78     Weight: 86 kg (189 lb 9.5 oz)  Body mass index is 28.83 kg/m².    Intake/Output Summary (Last 24 hours) at 11/1/2024 1523  Last data filed at 10/31/2024 2100  Gross per 24 hour   Intake 200 ml   Output --   Net 200 ml         Physical Exam  Constitutional:       General: She is not in acute distress.     Appearance: Normal appearance. She is ill-appearing.   HENT:      Head: Normocephalic.      Right Ear: External ear  normal.      Left Ear: External ear normal.      Mouth/Throat:      Mouth: Mucous membranes are moist.      Pharynx: Oropharynx is clear.   Eyes:      General: No scleral icterus.     Extraocular Movements: Extraocular movements intact.   Pulmonary:      Effort: Pulmonary effort is normal.   Musculoskeletal:      Right lower leg: No edema.      Left lower leg: No edema.      Comments: R ankle wrapped in brace   Skin:     General: Skin is warm.      Coloration: Skin is pale. Skin is not jaundiced.      Findings: No erythema or rash.   Neurological:      General: No focal deficit present.      Mental Status: She is alert and oriented to person, place, and time. Mental status is at baseline.   Psychiatric:         Mood and Affect: Mood normal.         Behavior: Behavior normal.         Thought Content: Thought content normal.             Significant Labs: All pertinent labs within the past 24 hours have been reviewed.    Significant Imaging: I have reviewed all pertinent imaging results/findings within the past 24 hours.    Assessment/Plan:      * C. difficile colitis  Diagnosed with C. Diff on admission from 9/24-10/7, likely obtained from outpatient rehab facility. She has had persistent watery diarrhea since discharge every 2-3 hours. Discharged on PO vancomycin until 11/7, but taper started early on 10/27 after she developed nausea and vomiting. N/V now resolved but noted a slightly bloody stool on 10/29 and gross bright red blood with clots on 10/30 prompting presentation to ED. She has been on a course of doxycycline as well for ankle surgical infection.    Plan:  - Cdiff panel negative. ID following, appreciate recs  - Started on Fidoxamicin but d/c on 11/1/24 due to negative panel  - consult to GI for hematochezia evaluation, appreciate recs  - Hold off on colonoscopy in the setting of acute infection  - Will monitor over the weekend and re-evaluate for possible colonoscopy on 11/4.   - daily cbc/cmp  - no  probiotics due to persistent glucose drops while taking them  - no zofran due to elevated qtc, can trial scopolamine patch if nauseous  - hold ASA/brilinta    Coronary artery disease  Prior MI in 2019. Remains on ASA/Brilinta. Was placed on warfarin post-op but has since stopped. Trop mildly elevated on admission. No acute EKG changes or chest pain.     Noted to have fluid retention in extremities since her fall. Started on 40mg lasix daily. Last echo from 11/2023. Never diagnosed with HF. No SOB or pitting edema indicating excess fluid at this time.      Plan:  - Chronic.   - Trop peaked at 0.096.   - TTE w/EF 60-65%. Mild Pulmonary HTN.   - if develops chest pain - stat EKG, repeat trop  - hold ASA/brilinta in setting of ongoing GI bleed  - hold lasix for bobbi    Elevated troponin level not due myocardial infarction  Troponin on admission 0.071. EKG only notable for prior infarct. No chest pain or SOB. Likely demand ischemia.    Plan:  Trops peaked at 0.096 then downtrended. Resolved.       Anemia  Chronic anemia likely exacerbated by acute blood loss. Most recent hemoglobin and hematocrit are listed below.  Recent Labs     10/30/24  1946   HGB 8.8*   HCT 27.5*     Patient has not received any transfusions this admission, however required transfusions on recent admissions    Plan  - daily cbc  - Transfuse PRBC if patient becomes hemodynamically unstable, symptomatic or H/H drops below 7/21. Will transfuse earlier if gross hematochezia continues  - Patient's anemia is currently  being monitored      Rectal bleeding  See C. Diff colitis    Wound dehiscence, right ankle  Fell on 8/14 with resultant right ankle and hip fracture. Underwent surgical fixation and was discharged to rehab. On discharge from rehab R ankle wound dehisced. Underwent wound debridement during readmission from 9/24-10/7. Ankle remains wrapped and braced with severe pain relieved by robaxin. Notes shooting pain but previously was unable to  tolerate gabapentin. On 6 week doxycycline course until 11/12 complicated by persistent C. Diff colitis.    Plan:  - consult wound care  - consult to ID for abx recs  - continue doxycycline course  - non-weight bearing until at least 11/1  - pain control with oxy 5 q8h prn      Stage 3b chronic kidney disease  Creatine stable for now. BMP reviewed- noted Estimated Creatinine Clearance: 21.8 mL/min (A) (based on SCr of 2.7 mg/dL (H)). according to latest data. Based on current GFR, CKD stage is stage 3 - GFR 30-59 with acute KYA.      Plan:  - Monitor UOP and serial BMP and adjust therapy as needed.   - Renally dose meds.   - Avoid nephrotoxic medications and procedures.  - hold lasix    Type 2 diabetes mellitus with stage 3b chronic kidney disease, with long-term current use of insulin    Lab Results   Component Value Date    HGBA1C 8.2 (H) 07/10/2024     Home regimen: On 12U lantus nightly with SSI at meals.      - Hold oral anti-glycemics while hospitalized  - low dose SSI  - Accu-checks q AC/HS  - Goal glucose 140-180  - Titrate insulin regimen as needed.   - Diabetic diet as tolerated  - Hypoglycemia protocol in place  - If blood glucose greater than 300, please ask patient not to eat food or drink anything other than water until correctional insulin has brought it back below 250  - If patient is NPO, please hold pre-meal Aspart (Daily Detemir/basal insulin is ok to give even if patient is NPO)       KYA (acute kidney injury)  KYA is likely due to pre-renal azotemia due to intravascular volume depletion. Baseline creatinine is  1.5 . Most recent creatinine and eGFR are listed below.  Recent Labs     10/30/24  1946 10/31/24  0218 11/01/24  0526   CREATININE 2.7* 2.8* 2.3*   EGFRNORACEVR 18.0* 17.2* 21.8*       Likely pre-renal in the setting of acute blood loss     Plan  - KYA is  being monitored . Improving.   - Avoid nephrotoxins and renally dose meds for GFR listed above  - Monitor urine output, daily cmp, and  adjust therapy as needed  - hold home lasix    Mixed hyperlipidemia  Statin previously held for chronic elevation in LFTs. Will not resume at this time.    Primary hypertension  Patients blood pressure range in the last 24 hours was: BP  Min: 99/54  Max: 130/61.The patient's inpatient anti-hypertensive regimen is listed below:  Current Antihypertensives  hydrALAZINE tablet 100 mg, Every 8 hours, Oral  isosorbide mononitrate 24 hr tablet 60 mg, Daily, Oral  metoprolol succinate (TOPROL-XL) 24 hr tablet 25 mg, Daily, Oral    Plan  - BP is controlled, no changes needed to their regimen  - avoid nephrotoxic antihypertensives    Anxiety  Chronic poorly controlled anxiety requiring PRN ativan near daily. Exacerbated by ongoing health concerns.    Plan:  - continue home fluoxetine  - PRN ativan  - hold home seroquel for elevated qtc, trial of melatonin and daily ekg      VTE Risk Mitigation (From admission, onward)           Ordered     Reason for No Pharmacological VTE Prophylaxis  Once        Question:  Reasons:  Answer:  Active Bleeding    10/30/24 2302     IP VTE HIGH RISK PATIENT  Once         10/30/24 2302     Place sequential compression device  Until discontinued         10/30/24 2302                    Discharge Planning   MIKHAIL: 11/5/2024     Code Status: Partial Code   Is the patient medically ready for discharge?:     Reason for patient still in hospital (select all that apply): Patient trending condition, Treatment, and Consult recommendations  Discharge Plan A: Home Health                  Urmila Polk MD  Department of Hospital Medicine   [facility] - Internal Medicine Telemetry

## 2024-11-01 NOTE — ASSESSMENT & PLAN NOTE
Prior MI in 2019. Remains on ASA/Brilinta. Was placed on warfarin post-op but has since stopped. Trop mildly elevated on admission. No acute EKG changes or chest pain.     Noted to have fluid retention in extremities since her fall. Started on 40mg lasix daily. Last echo from 11/2023. Never diagnosed with HF. No SOB or pitting edema indicating excess fluid at this time.      Plan:  - Chronic.   - Trop peaked at 0.096.   - TTE w/EF 60-65%. Mild Pulmonary HTN.   - if develops chest pain - stat EKG, repeat trop  - hold ASA/brilinta in setting of ongoing GI bleed  - hold lasix for bobbi

## 2024-11-01 NOTE — SUBJECTIVE & OBJECTIVE
Interval History: still having soft dark bloody stools. Had episode of emesis this AM. No ongoing abdominal pain.     Review of Systems   Constitutional:  Positive for fatigue. Negative for chills and fever.   Gastrointestinal:  Positive for nausea and vomiting.   Skin:  Positive for wound.     Objective:     Vital Signs (Most Recent):  Temp: 98.9 °F (37.2 °C) (11/01/24 1151)  Pulse: 107 (11/01/24 1151)  Resp: 18 (11/01/24 1151)  BP: (!) 156/78 (11/01/24 1151)  SpO2: 95 % (11/01/24 1151) Vital Signs (24h Range):  Temp:  [97.6 °F (36.4 °C)-99.1 °F (37.3 °C)] 98.9 °F (37.2 °C)  Pulse:  [] 107  Resp:  [16-18] 18  SpO2:  [95 %-99 %] 95 %  BP: (100-156)/(57-78) 156/78     Weight: 86 kg (189 lb 9.5 oz)  Body mass index is 28.83 kg/m².    Estimated Creatinine Clearance: 24.6 mL/min (A) (based on SCr of 2.3 mg/dL (H)).     Physical Exam  Vitals and nursing note reviewed.   Constitutional:       Appearance: Normal appearance. She is not ill-appearing.   HENT:      Head: Normocephalic.   Pulmonary:      Effort: Pulmonary effort is normal. No respiratory distress.   Abdominal:      General: There is no distension.      Palpations: Abdomen is soft.      Tenderness: There is no abdominal tenderness.   Musculoskeletal:      Comments: Ankle surgical site healing well   Neurological:      Mental Status: She is alert.   Psychiatric:         Mood and Affect: Mood normal.         Behavior: Behavior normal.          Significant Labs:   Microbiology Results (last 7 days)       Procedure Component Value Units Date/Time    Clostridium difficile EIA [[id number]] Collected: 10/31/24 8517    Order Status: Completed Specimen: Stool Updated: 10/31/24 2231     C. diff Antigen Negative     C difficile Toxins A+B, EIA Negative     Comment: Testing not recommended for children <24 months old.       Narrative:      1 - 3 Days: If liquid stool unable to be collected in  community onset window (hospital day 1 - 3), the order will  be  discontinued automatically. Do not send formed stool for  testing            Significant Imaging: I have reviewed all pertinent imaging results/findings within the past 24 hours.

## 2024-11-02 LAB
ALBUMIN SERPL BCP-MCNC: 2.2 G/DL (ref 3.5–5.2)
ALP SERPL-CCNC: 167 U/L (ref 40–150)
ALT SERPL W/O P-5'-P-CCNC: 27 U/L (ref 10–44)
ANION GAP SERPL CALC-SCNC: 9 MMOL/L (ref 8–16)
AST SERPL-CCNC: 39 U/L (ref 10–40)
BASOPHILS # BLD AUTO: 0.05 K/UL (ref 0–0.2)
BASOPHILS NFR BLD: 0.7 % (ref 0–1.9)
BILIRUB SERPL-MCNC: 0.3 MG/DL (ref 0.1–1)
BUN SERPL-MCNC: 48 MG/DL (ref 8–23)
CALCIUM SERPL-MCNC: 8.9 MG/DL (ref 8.7–10.5)
CHLORIDE SERPL-SCNC: 104 MMOL/L (ref 95–110)
CO2 SERPL-SCNC: 23 MMOL/L (ref 23–29)
CREAT SERPL-MCNC: 2.2 MG/DL (ref 0.5–1.4)
DIFFERENTIAL METHOD BLD: ABNORMAL
EOSINOPHIL # BLD AUTO: 0.2 K/UL (ref 0–0.5)
EOSINOPHIL NFR BLD: 3.1 % (ref 0–8)
ERYTHROCYTE [DISTWIDTH] IN BLOOD BY AUTOMATED COUNT: 14.4 % (ref 11.5–14.5)
EST. GFR  (NO RACE VARIABLE): 23 ML/MIN/1.73 M^2
GLUCOSE SERPL-MCNC: 122 MG/DL (ref 70–110)
HCT VFR BLD AUTO: 23.7 % (ref 37–48.5)
HGB BLD-MCNC: 7.8 G/DL (ref 12–16)
IMM GRANULOCYTES # BLD AUTO: 0.04 K/UL (ref 0–0.04)
IMM GRANULOCYTES NFR BLD AUTO: 0.5 % (ref 0–0.5)
LYMPHOCYTES # BLD AUTO: 1.6 K/UL (ref 1–4.8)
LYMPHOCYTES NFR BLD: 21.7 % (ref 18–48)
MAGNESIUM SERPL-MCNC: 1.7 MG/DL (ref 1.6–2.6)
MCH RBC QN AUTO: 28.9 PG (ref 27–31)
MCHC RBC AUTO-ENTMCNC: 32.9 G/DL (ref 32–36)
MCV RBC AUTO: 88 FL (ref 82–98)
MONOCYTES # BLD AUTO: 0.8 K/UL (ref 0.3–1)
MONOCYTES NFR BLD: 10.2 % (ref 4–15)
NEUTROPHILS # BLD AUTO: 4.7 K/UL (ref 1.8–7.7)
NEUTROPHILS NFR BLD: 63.8 % (ref 38–73)
NRBC BLD-RTO: 0 /100 WBC
OHS QRS DURATION: 84 MS
OHS QTC CALCULATION: 493 MS
PHOSPHATE SERPL-MCNC: 3.6 MG/DL (ref 2.7–4.5)
PLATELET # BLD AUTO: 178 K/UL (ref 150–450)
PMV BLD AUTO: 13.7 FL (ref 9.2–12.9)
POTASSIUM SERPL-SCNC: 3.3 MMOL/L (ref 3.5–5.1)
PROT SERPL-MCNC: 5.7 G/DL (ref 6–8.4)
RBC # BLD AUTO: 2.7 M/UL (ref 4–5.4)
SODIUM SERPL-SCNC: 136 MMOL/L (ref 136–145)
WBC # BLD AUTO: 7.36 K/UL (ref 3.9–12.7)

## 2024-11-02 PROCEDURE — 83735 ASSAY OF MAGNESIUM: CPT | Mod: HCNC

## 2024-11-02 PROCEDURE — 25000003 PHARM REV CODE 250: Mod: HCNC | Performed by: STUDENT IN AN ORGANIZED HEALTH CARE EDUCATION/TRAINING PROGRAM

## 2024-11-02 PROCEDURE — 27000207 HC ISOLATION: Mod: HCNC

## 2024-11-02 PROCEDURE — 25000003 PHARM REV CODE 250: Mod: HCNC

## 2024-11-02 PROCEDURE — 93005 ELECTROCARDIOGRAM TRACING: CPT | Mod: HCNC

## 2024-11-02 PROCEDURE — 80053 COMPREHEN METABOLIC PANEL: CPT | Mod: HCNC

## 2024-11-02 PROCEDURE — 25000003 PHARM REV CODE 250: Mod: HCNC | Performed by: HOSPITALIST

## 2024-11-02 PROCEDURE — 36415 COLL VENOUS BLD VENIPUNCTURE: CPT | Mod: HCNC

## 2024-11-02 PROCEDURE — 93010 ELECTROCARDIOGRAM REPORT: CPT | Mod: HCNC,,, | Performed by: INTERNAL MEDICINE

## 2024-11-02 PROCEDURE — 21400001 HC TELEMETRY ROOM: Mod: HCNC

## 2024-11-02 PROCEDURE — 84100 ASSAY OF PHOSPHORUS: CPT | Mod: HCNC

## 2024-11-02 PROCEDURE — 85025 COMPLETE CBC W/AUTO DIFF WBC: CPT | Mod: HCNC

## 2024-11-02 RX ORDER — ONDANSETRON 4 MG/1
4 TABLET, FILM COATED ORAL ONCE AS NEEDED
Status: DISCONTINUED | OUTPATIENT
Start: 2024-11-02 | End: 2024-11-05 | Stop reason: HOSPADM

## 2024-11-02 RX ORDER — POTASSIUM CHLORIDE 20 MEQ/1
40 TABLET, EXTENDED RELEASE ORAL
Status: ACTIVE | OUTPATIENT
Start: 2024-11-02 | End: 2024-11-02

## 2024-11-02 RX ORDER — PANTOPRAZOLE SODIUM 40 MG/1
40 TABLET, DELAYED RELEASE ORAL DAILY
Status: DISCONTINUED | OUTPATIENT
Start: 2024-11-02 | End: 2024-11-05 | Stop reason: HOSPADM

## 2024-11-02 RX ADMIN — Medication 6 MG: at 09:11

## 2024-11-02 RX ADMIN — VANCOMYCIN HYDROCHLORIDE 125 MG: KIT at 10:11

## 2024-11-02 RX ADMIN — ISOSORBIDE MONONITRATE 60 MG: 60 TABLET, EXTENDED RELEASE ORAL at 10:11

## 2024-11-02 RX ADMIN — METHOCARBAMOL 500 MG: 500 TABLET ORAL at 09:11

## 2024-11-02 RX ADMIN — DOXYCYCLINE HYCLATE 100 MG: 100 TABLET, COATED ORAL at 09:11

## 2024-11-02 RX ADMIN — FLUOXETINE HYDROCHLORIDE 40 MG: 20 CAPSULE ORAL at 10:11

## 2024-11-02 RX ADMIN — POTASSIUM CHLORIDE 40 MEQ: 1500 TABLET, EXTENDED RELEASE ORAL at 10:11

## 2024-11-02 RX ADMIN — OXYCODONE 5 MG: 5 TABLET ORAL at 01:11

## 2024-11-02 RX ADMIN — HYDROXYZINE HYDROCHLORIDE 25 MG: 25 TABLET, FILM COATED ORAL at 09:11

## 2024-11-02 RX ADMIN — PANTOPRAZOLE SODIUM 40 MG: 40 TABLET, DELAYED RELEASE ORAL at 02:11

## 2024-11-02 RX ADMIN — METOPROLOL SUCCINATE 25 MG: 25 TABLET, EXTENDED RELEASE ORAL at 10:11

## 2024-11-02 RX ADMIN — DOXYCYCLINE HYCLATE 100 MG: 100 TABLET, COATED ORAL at 10:11

## 2024-11-02 NOTE — PROGRESS NOTES
David Mijares - Internal Medicine [facility]  Progress Note    Patient Name: Lorena Contreras  MRN: [id number]  Patient Class: IP- Inpatient   Admission Date: 10/30/2024  Length of Stay: 2 days  Attending Physician: Marita Haywood MD  Primary Care Provider: Jorge Paris MD        Subjective:     Principal Problem:C. difficile colitis        HPI:  Lorena Contreras is a 74y female with a pmhx of CKD, CAD (MI 2019), HTN, fibromyalgia, and lymphoma (s/p chemo, in remission) who presented today with large volume bright red blood with clots in her stool. In August she fell injuring her right hip and ankle which required surgical fixation. On discharge from SNF her ankle wound dehisced requiring surgical debridement. At time of that admission she was found to have c. Diff colitis. She was discharged on a 6 week course of doxycycline and PO vancomycin until completion of doxycyline course. Her diarrhea is still occurring every 2-3 hours and 3 days ago she developed nausea and vomiting. Her outpatient ID physician advised her to taper her PO vancomycin from q6h to BID. Yesterday her daughter noticed a darkening of her stools and today she was noted to have a pad filled with bright red blood and clots. While in the ED she developed abdominal pain localized to RLQ. No further bowel movements since and has remained NPO. She notes chills, anxiety, ankle pain, and new abdominal pain but denies any fevers, current dizziness, changes in vision, chest pains, vomiting, or extremity swelling. She reports feeling dizzy when arising in the morning for the past few weeks. Last dose of ASA and brilinta was this morning.     In the ED she was hemodynamically stable with a hgb of 8.8 near her baseline. BUN 67 and Cr 2.7 with baseline of 1.5. AST 51, ALT 37, ALKP 181 all at baseline. Troponin mildly elevated at 0.071. EKG with no acute changes.         Overview/Hospital Course:  Admitted to Hospital Medicine for  further evaluation and management of C.Diff colitis.  Placed in the proper contact precautions. Patient's doxycycline was continued from previous regimen.GI consulted for evaluation of lower GI bleed. Plan for colonoscopy Monday. ID consulted. Patient was initially continued on her Vancomycin but then transitioned to Fidaxomicin on 10/31. Cdiff panel negative. Fidaxomicin was discontinued and patient was started back on PO Vancomycin 125mg daily for secondary prophylaxis while on the Doxycycline.     Interval History: Overnight pt had a fever. Overnight team ordered tylenol with resolution of symptoms. Pt also complained of leg spasms until 1am. Resolution of symptoms with robaxin. Has hx of restless leg syndrome. Pt had another bloody BM this AM. Plan for colonoscopy Monday. Appetite is improving.     Review of Systems   Constitutional:  Negative for chills, fatigue and unexpected weight change.   HENT:  Negative for congestion, sinus pain and sore throat.    Respiratory:  Negative for cough, chest tightness and shortness of breath.    Cardiovascular:  Negative for chest pain, palpitations and leg swelling.   Gastrointestinal:  Negative for abdominal pain, diarrhea, nausea and vomiting.     Objective:     Vital Signs (Most Recent):  Temp: 98.9 °F (37.2 °C) (11/02/24 1216)  Pulse: 98 (11/02/24 1217)  Resp: 17 (11/02/24 1216)  BP: 137/67 (11/02/24 1217)  SpO2: 99 % (11/02/24 1216) Vital Signs (24h Range):  Temp:  [98.1 °F (36.7 °C)-100.2 °F (37.9 °C)] 98.9 °F (37.2 °C)  Pulse:  [] 98  Resp:  [16-18] 17  SpO2:  [98 %-100 %] 99 %  BP: (121-146)/(54-67) 137/67     Weight: 86 kg (189 lb 9.5 oz)  Body mass index is 28.83 kg/m².    Intake/Output Summary (Last 24 hours) at 11/2/2024 1410  Last data filed at 11/2/2024 1037  Gross per 24 hour   Intake 240 ml   Output 1 ml   Net 239 ml         Physical Exam  Constitutional:       General: She is not in acute distress.     Appearance: Normal appearance. She is  ill-appearing.   HENT:      Head: Normocephalic.      Right Ear: External ear normal.      Left Ear: External ear normal.      Mouth/Throat:      Mouth: Mucous membranes are moist.      Pharynx: Oropharynx is clear.   Eyes:      General: No scleral icterus.     Extraocular Movements: Extraocular movements intact.   Pulmonary:      Effort: Pulmonary effort is normal.   Musculoskeletal:      Right lower leg: No edema.      Left lower leg: No edema.      Comments: R ankle wrapped in brace   Skin:     General: Skin is warm.      Coloration: Skin is pale. Skin is not jaundiced.      Findings: No erythema or rash.   Neurological:      General: No focal deficit present.      Mental Status: She is alert and oriented to person, place, and time. Mental status is at baseline.   Psychiatric:         Mood and Affect: Mood normal.         Behavior: Behavior normal.         Thought Content: Thought content normal.             Significant Labs: All pertinent labs within the past 24 hours have been reviewed.  CBC:   Recent Labs   Lab 11/01/24  0526 11/02/24  0429   WBC 7.47 7.36   HGB 7.6* 7.8*   HCT 24.1* 23.7*    178     CMP:   Recent Labs   Lab 11/01/24  0526 11/02/24  0429   * 136   K 3.6 3.3*    104   CO2 22* 23   * 122*   BUN 57* 48*   CREATININE 2.3* 2.2*   CALCIUM 8.6* 8.9   PROT 5.7* 5.7*   ALBUMIN 2.3* 2.2*   BILITOT 0.3 0.3   ALKPHOS 148 167*   AST 37 39   ALT 25 27   ANIONGAP 10 9       Significant Imaging: I have reviewed all pertinent imaging results/findings within the past 24 hours.    Assessment/Plan:      * C. difficile colitis  Diagnosed with C. Diff on admission from 9/24-10/7, likely obtained from outpatient rehab facility. She has had persistent watery diarrhea since discharge every 2-3 hours. Discharged on PO vancomycin until 11/7, but taper started early on 10/27 after she developed nausea and vomiting. N/V now resolved but noted a slightly bloody stool on 10/29 and gross bright red  blood with clots on 10/30 prompting presentation to ED. She has been on a course of doxycycline as well for ankle surgical infection.    Plan:  - Cdiff panel negative. ID following, appreciate recs  -Cdiff panel negative.   - Fidaxomicin was discontinued and patient was started back on PO Vancomycin 125mg daily for secondary prophylaxis while on the Doxycycline.    - consult to GI for hematochezia evaluation, appreciate recs  - Plan for colonoscopy Monday.   - daily cbc/cmp  - no probiotics due to persistent glucose drops while taking them  - no zofran due to elevated qtc, can trial scopolamine patch if nauseous  - hold ASA/brilinta    Coronary artery disease  Prior MI in 2019. Remains on ASA/Brilinta. Was placed on warfarin post-op but has since stopped. Trop mildly elevated on admission. No acute EKG changes or chest pain.     Noted to have fluid retention in extremities since her fall. Started on 40mg lasix daily. Last echo from 11/2023. Never diagnosed with HF. No SOB or pitting edema indicating excess fluid at this time.      Plan:  - Chronic.   - Trop peaked at 0.096.   - TTE w/EF 60-65%. Mild Pulmonary HTN.   - if develops chest pain - stat EKG, repeat trop  - hold ASA/brilinta in setting of ongoing GI bleed  - hold lasix for bobbi    Elevated troponin level not due myocardial infarction  Troponin on admission 0.071. EKG only notable for prior infarct. No chest pain or SOB. Likely demand ischemia.    Plan:  Trops peaked at 0.096 then downtrended. Resolved.       Anemia  Chronic anemia likely exacerbated by acute blood loss. Most recent hemoglobin and hematocrit are listed below.  Recent Labs     10/31/24  0218 11/01/24  0526 11/02/24  0429   HGB 8.6* 7.6* 7.8*   HCT 27.1* 24.1* 23.7*       Patient has not received any transfusions this admission, however required transfusions on recent admissions    Plan  - daily cbc  - Transfuse PRBC if patient becomes hemodynamically unstable, symptomatic or H/H drops below  7/21. Will transfuse earlier if gross hematochezia continues  - Patient's anemia is currently  being monitored      Rectal bleeding  See C. Diff colitis    Wound dehiscence, right ankle  Fell on 8/14 with resultant right ankle and hip fracture. Underwent surgical fixation and was discharged to rehab. On discharge from rehab R ankle wound dehisced. Underwent wound debridement during readmission from 9/24-10/7. Ankle remains wrapped and braced with severe pain relieved by robaxin. Notes shooting pain but previously was unable to tolerate gabapentin. On 6 week doxycycline course until 11/12 complicated by persistent C. Diff colitis.    Plan:  - consult wound care  - Right ankle incision: bedside nursing to cleanse with NS, pat dry, apply a bordered island dressing to the wound, cover with cast padding and loosely secure with an elastic bandage every 3 days   - consult to ID for abx recs  - continue doxycycline course. Added a PPI due to esophagitis symptoms.   - non-weight bearing until at least 11/1  - pain control with oxy 5 q8h prn      Stage 3b chronic kidney disease  Creatine stable for now. BMP reviewed- noted Estimated Creatinine Clearance: 21.8 mL/min (A) (based on SCr of 2.7 mg/dL (H)). according to latest data. Based on current GFR, CKD stage is stage 3 - GFR 30-59 with acute KYA.      Plan:  - Monitor UOP and serial BMP and adjust therapy as needed.   - Renally dose meds.   - Avoid nephrotoxic medications and procedures.  - hold lasix    Type 2 diabetes mellitus with stage 3b chronic kidney disease, with long-term current use of insulin    Lab Results   Component Value Date    HGBA1C 8.2 (H) 07/10/2024     Home regimen: On 12U lantus nightly with SSI at meals.      - Hold oral anti-glycemics while hospitalized  - low dose SSI  - Accu-checks q AC/HS  - Goal glucose 140-180  - Titrate insulin regimen as needed.   - Diabetic diet as tolerated  - Hypoglycemia protocol in place  - If blood glucose greater than  300, please ask patient not to eat food or drink anything other than water until correctional insulin has brought it back below 250  - If patient is NPO, please hold pre-meal Aspart (Daily Detemir/basal insulin is ok to give even if patient is NPO)       KYA (acute kidney injury)  KYA is likely due to pre-renal azotemia due to intravascular volume depletion. Baseline creatinine is  1.5 . Most recent creatinine and eGFR are listed below.  Recent Labs     10/31/24  0218 11/01/24  0526 11/02/24  0429   CREATININE 2.8* 2.3* 2.2*   EGFRNORACEVR 17.2* 21.8* 23.0*       Likely pre-renal in the setting of acute blood loss     Plan  - KYA is  being monitored . Improving.   - Avoid nephrotoxins and renally dose meds for GFR listed above  - Monitor urine output, daily cmp, and adjust therapy as needed  - hold home lasix    Mixed hyperlipidemia  Statin previously held for chronic elevation in LFTs. Will not resume at this time.    Primary hypertension  Patients blood pressure range in the last 24 hours was: BP  Min: 99/54  Max: 130/61.The patient's inpatient anti-hypertensive regimen is listed below:  Current Antihypertensives  hydrALAZINE tablet 100 mg, Every 8 hours, Oral  isosorbide mononitrate 24 hr tablet 60 mg, Daily, Oral  metoprolol succinate (TOPROL-XL) 24 hr tablet 25 mg, Daily, Oral    Plan  - BP is controlled, no changes needed to their regimen  - avoid nephrotoxic antihypertensives    Anxiety  Chronic poorly controlled anxiety requiring PRN ativan near daily. Exacerbated by ongoing health concerns.    Plan:  - continue home fluoxetine  - PRN ativan  - hold home seroquel for elevated qtc, trial of melatonin and daily ekg      VTE Risk Mitigation (From admission, onward)           Ordered     Reason for No Pharmacological VTE Prophylaxis  Once        Question:  Reasons:  Answer:  Active Bleeding    10/30/24 2302     IP VTE HIGH RISK PATIENT  Once         10/30/24 2302     Place sequential compression device  Until  discontinued         10/30/24 2302                    Discharge Planning   MIKHAIL: 11/5/2024     Code Status: Partial Code   Is the patient medically ready for discharge?:     Reason for patient still in hospital (select all that apply): Patient trending condition  Discharge Plan A: Home Health                  Ericka Manning MD  Department of Hospital Medicine   [facility] - Internal Medicine Telemetry

## 2024-11-02 NOTE — ASSESSMENT & PLAN NOTE
KYA is likely due to pre-renal azotemia due to intravascular volume depletion. Baseline creatinine is  1.5 . Most recent creatinine and eGFR are listed below.  Recent Labs     10/31/24  0218 11/01/24  0526 11/02/24  0429   CREATININE 2.8* 2.3* 2.2*   EGFRNORACEVR 17.2* 21.8* 23.0*       Likely pre-renal in the setting of acute blood loss     Plan  - KYA is  being monitored . Improving.   - Avoid nephrotoxins and renally dose meds for GFR listed above  - Monitor urine output, daily cmp, and adjust therapy as needed  - hold home lasix

## 2024-11-02 NOTE — ASSESSMENT & PLAN NOTE
Fell on 8/14 with resultant right ankle and hip fracture. Underwent surgical fixation and was discharged to rehab. On discharge from rehab R ankle wound dehisced. Underwent wound debridement during readmission from 9/24-10/7. Ankle remains wrapped and braced with severe pain relieved by robaxin. Notes shooting pain but previously was unable to tolerate gabapentin. On 6 week doxycycline course until 11/12 complicated by persistent C. Diff colitis.    Plan:  - consult wound care  - Right ankle incision: bedside nursing to cleanse with NS, pat dry, apply a bordered island dressing to the wound, cover with cast padding and loosely secure with an elastic bandage every 3 days   - consult to ID for abx recs  - continue doxycycline course. Added a PPI due to esophagitis symptoms.   - non-weight bearing until at least 11/1  - pain control with oxy 5 q8h prn

## 2024-11-02 NOTE — PLAN OF CARE
Problem: Adult Inpatient Plan of Care  Goal: Plan of Care Review  Outcome: Not Progressing  Goal: Optimal Comfort and Wellbeing  Outcome: Not Progressing     Problem: Infection  Goal: Absence of Infection Signs and Symptoms  Outcome: Progressing     Problem: Wound  Goal: Optimal Coping  Outcome: Progressing

## 2024-11-02 NOTE — PLAN OF CARE
Problem: Adult Inpatient Plan of Care  Goal: Plan of Care Review  11/1/2024 1925 by Leeann Chavis, RN  Outcome: Not Progressing  11/1/2024 1925 by Leeann Chavis, RN  Outcome: Not Progressing  Goal: Optimal Comfort and Wellbeing  Outcome: Not Progressing     Problem: Infection  Goal: Absence of Infection Signs and Symptoms  Outcome: Progressing     Problem: Wound  Goal: Skin Health and Integrity  Outcome: Progressing      

## 2024-11-02 NOTE — SUBJECTIVE & OBJECTIVE
Interval History: Overnight pt had a fever. Overnight team ordered tylenol with resolution of symptoms. Pt also complained of leg spasms until 1am. Resolution of symptoms with robaxin. Has hx of restless leg syndrome. Pt had another bloody BM this AM. Plan for colonoscopy Monday. Appetite is improving.     Review of Systems   Constitutional:  Negative for chills, fatigue and unexpected weight change.   HENT:  Negative for congestion, sinus pain and sore throat.    Respiratory:  Negative for cough, chest tightness and shortness of breath.    Cardiovascular:  Negative for chest pain, palpitations and leg swelling.   Gastrointestinal:  Negative for abdominal pain, diarrhea, nausea and vomiting.     Objective:     Vital Signs (Most Recent):  Temp: 98.9 °F (37.2 °C) (11/02/24 1216)  Pulse: 98 (11/02/24 1217)  Resp: 17 (11/02/24 1216)  BP: 137/67 (11/02/24 1217)  SpO2: 99 % (11/02/24 1216) Vital Signs (24h Range):  Temp:  [98.1 °F (36.7 °C)-100.2 °F (37.9 °C)] 98.9 °F (37.2 °C)  Pulse:  [] 98  Resp:  [16-18] 17  SpO2:  [98 %-100 %] 99 %  BP: (121-146)/(54-67) 137/67     Weight: 86 kg (189 lb 9.5 oz)  Body mass index is 28.83 kg/m².    Intake/Output Summary (Last 24 hours) at 11/2/2024 1410  Last data filed at 11/2/2024 1037  Gross per 24 hour   Intake 240 ml   Output 1 ml   Net 239 ml         Physical Exam  Constitutional:       General: She is not in acute distress.     Appearance: Normal appearance. She is ill-appearing.   HENT:      Head: Normocephalic.      Right Ear: External ear normal.      Left Ear: External ear normal.      Mouth/Throat:      Mouth: Mucous membranes are moist.      Pharynx: Oropharynx is clear.   Eyes:      General: No scleral icterus.     Extraocular Movements: Extraocular movements intact.   Pulmonary:      Effort: Pulmonary effort is normal.   Musculoskeletal:      Right lower leg: No edema.      Left lower leg: No edema.      Comments: R ankle wrapped in brace   Skin:     General: Skin  is warm.      Coloration: Skin is pale. Skin is not jaundiced.      Findings: No erythema or rash.   Neurological:      General: No focal deficit present.      Mental Status: She is alert and oriented to person, place, and time. Mental status is at baseline.   Psychiatric:         Mood and Affect: Mood normal.         Behavior: Behavior normal.         Thought Content: Thought content normal.             Significant Labs: All pertinent labs within the past 24 hours have been reviewed.  CBC:   Recent Labs   Lab 11/01/24 0526 11/02/24 0429   WBC 7.47 7.36   HGB 7.6* 7.8*   HCT 24.1* 23.7*    178     CMP:   Recent Labs   Lab 11/01/24 0526 11/02/24 0429   * 136   K 3.6 3.3*    104   CO2 22* 23   * 122*   BUN 57* 48*   CREATININE 2.3* 2.2*   CALCIUM 8.6* 8.9   PROT 5.7* 5.7*   ALBUMIN 2.3* 2.2*   BILITOT 0.3 0.3   ALKPHOS 148 167*   AST 37 39   ALT 25 27   ANIONGAP 10 9       Significant Imaging: I have reviewed all pertinent imaging results/findings within the past 24 hours.

## 2024-11-02 NOTE — PLAN OF CARE
Problem: Adult Inpatient Plan of Care  Goal: Plan of Care Review  11/2/2024 0544 by Cornelius Thomson RN  Outcome: Progressing     Problem: Adult Inpatient Plan of Care  Goal: Patient-Specific Goal (Individualized)  11/2/2024 0544 by Cornelius Thomson RN  Outcome: Progressing     Problem: Infection  Goal: Absence of Infection Signs and Symptoms  Outcome: Progressing     Problem: Mobility Impairment  Goal: Optimal Mobility  Outcome: Progressing     Problem: Pain Acute  Goal: Optimal Pain Control and Function  Outcome: Progressing     Problem: Diarrhea  Goal: Effective Diarrhea Management  Outcome: Progressing        Aaox4. Pt c/o of twitching pain/discomfort from lower abdomen to BLE that wakes her up from sleep. Robaxin and hydroxyzine administered, pt reports improvement of symptoms. MD informed

## 2024-11-02 NOTE — ASSESSMENT & PLAN NOTE
Chronic anemia likely exacerbated by acute blood loss. Most recent hemoglobin and hematocrit are listed below.  Recent Labs     10/31/24  0218 11/01/24  0526 11/02/24  0429   HGB 8.6* 7.6* 7.8*   HCT 27.1* 24.1* 23.7*       Patient has not received any transfusions this admission, however required transfusions on recent admissions    Plan  - daily cbc  - Transfuse PRBC if patient becomes hemodynamically unstable, symptomatic or H/H drops below 7/21. Will transfuse earlier if gross hematochezia continues  - Patient's anemia is currently  being monitored

## 2024-11-02 NOTE — ASSESSMENT & PLAN NOTE
Diagnosed with C. Diff on admission from 9/24-10/7, likely obtained from outpatient rehab facility. She has had persistent watery diarrhea since discharge every 2-3 hours. Discharged on PO vancomycin until 11/7, but taper started early on 10/27 after she developed nausea and vomiting. N/V now resolved but noted a slightly bloody stool on 10/29 and gross bright red blood with clots on 10/30 prompting presentation to ED. She has been on a course of doxycycline as well for ankle surgical infection.    Plan:  - Cdiff panel negative. ID following, appreciate recs  -Cdiff panel negative.   - Fidaxomicin was discontinued and patient was started back on PO Vancomycin 125mg daily for secondary prophylaxis while on the Doxycycline.    - consult to GI for hematochezia evaluation, appreciate recs  - Plan for colonoscopy Monday.   - daily cbc/cmp  - no probiotics due to persistent glucose drops while taking them  - no zofran due to elevated qtc, can trial scopolamine patch if nauseous  - hold ASA/brilinta

## 2024-11-03 LAB
ALBUMIN SERPL BCP-MCNC: 2.2 G/DL (ref 3.5–5.2)
ALP SERPL-CCNC: 171 U/L (ref 40–150)
ALT SERPL W/O P-5'-P-CCNC: 29 U/L (ref 10–44)
ANION GAP SERPL CALC-SCNC: 9 MMOL/L (ref 8–16)
AST SERPL-CCNC: 45 U/L (ref 10–40)
BASOPHILS # BLD AUTO: 0.05 K/UL (ref 0–0.2)
BASOPHILS NFR BLD: 0.6 % (ref 0–1.9)
BILIRUB SERPL-MCNC: 0.3 MG/DL (ref 0.1–1)
BUN SERPL-MCNC: 36 MG/DL (ref 8–23)
CALCIUM SERPL-MCNC: 9 MG/DL (ref 8.7–10.5)
CHLORIDE SERPL-SCNC: 109 MMOL/L (ref 95–110)
CO2 SERPL-SCNC: 20 MMOL/L (ref 23–29)
CREAT SERPL-MCNC: 1.7 MG/DL (ref 0.5–1.4)
DIFFERENTIAL METHOD BLD: ABNORMAL
EOSINOPHIL # BLD AUTO: 0.3 K/UL (ref 0–0.5)
EOSINOPHIL NFR BLD: 3.4 % (ref 0–8)
ERYTHROCYTE [DISTWIDTH] IN BLOOD BY AUTOMATED COUNT: 14.1 % (ref 11.5–14.5)
EST. GFR  (NO RACE VARIABLE): 31.3 ML/MIN/1.73 M^2
GLUCOSE SERPL-MCNC: 125 MG/DL (ref 70–110)
HCT VFR BLD AUTO: 24.5 % (ref 37–48.5)
HGB BLD-MCNC: 7.7 G/DL (ref 12–16)
IMM GRANULOCYTES # BLD AUTO: 0.05 K/UL (ref 0–0.04)
IMM GRANULOCYTES NFR BLD AUTO: 0.6 % (ref 0–0.5)
LYMPHOCYTES # BLD AUTO: 1.7 K/UL (ref 1–4.8)
LYMPHOCYTES NFR BLD: 22 % (ref 18–48)
MAGNESIUM SERPL-MCNC: 1.6 MG/DL (ref 1.6–2.6)
MCH RBC QN AUTO: 28.2 PG (ref 27–31)
MCHC RBC AUTO-ENTMCNC: 31.4 G/DL (ref 32–36)
MCV RBC AUTO: 90 FL (ref 82–98)
MONOCYTES # BLD AUTO: 0.8 K/UL (ref 0.3–1)
MONOCYTES NFR BLD: 9.7 % (ref 4–15)
NEUTROPHILS # BLD AUTO: 5 K/UL (ref 1.8–7.7)
NEUTROPHILS NFR BLD: 63.7 % (ref 38–73)
NRBC BLD-RTO: 0 /100 WBC
OHS QRS DURATION: 82 MS
OHS QTC CALCULATION: 469 MS
PHOSPHATE SERPL-MCNC: 3 MG/DL (ref 2.7–4.5)
PLATELET # BLD AUTO: 191 K/UL (ref 150–450)
PMV BLD AUTO: 13 FL (ref 9.2–12.9)
POTASSIUM SERPL-SCNC: 4.1 MMOL/L (ref 3.5–5.1)
PROT SERPL-MCNC: 5.9 G/DL (ref 6–8.4)
RBC # BLD AUTO: 2.73 M/UL (ref 4–5.4)
SODIUM SERPL-SCNC: 138 MMOL/L (ref 136–145)
WBC # BLD AUTO: 7.83 K/UL (ref 3.9–12.7)

## 2024-11-03 PROCEDURE — 25000003 PHARM REV CODE 250: Mod: HCNC | Performed by: HOSPITALIST

## 2024-11-03 PROCEDURE — 93010 ELECTROCARDIOGRAM REPORT: CPT | Mod: HCNC,,, | Performed by: INTERNAL MEDICINE

## 2024-11-03 PROCEDURE — 36415 COLL VENOUS BLD VENIPUNCTURE: CPT | Mod: HCNC

## 2024-11-03 PROCEDURE — 80053 COMPREHEN METABOLIC PANEL: CPT | Mod: HCNC

## 2024-11-03 PROCEDURE — 93005 ELECTROCARDIOGRAM TRACING: CPT | Mod: HCNC

## 2024-11-03 PROCEDURE — 25000003 PHARM REV CODE 250: Mod: HCNC

## 2024-11-03 PROCEDURE — 83735 ASSAY OF MAGNESIUM: CPT | Mod: HCNC

## 2024-11-03 PROCEDURE — 25000003 PHARM REV CODE 250: Mod: HCNC | Performed by: STUDENT IN AN ORGANIZED HEALTH CARE EDUCATION/TRAINING PROGRAM

## 2024-11-03 PROCEDURE — 85025 COMPLETE CBC W/AUTO DIFF WBC: CPT | Mod: HCNC

## 2024-11-03 PROCEDURE — 27000207 HC ISOLATION: Mod: HCNC

## 2024-11-03 PROCEDURE — 84100 ASSAY OF PHOSPHORUS: CPT | Mod: HCNC

## 2024-11-03 PROCEDURE — 21400001 HC TELEMETRY ROOM: Mod: HCNC

## 2024-11-03 RX ORDER — POLYETHYLENE GLYCOL 3350, SODIUM SULFATE ANHYDROUS, SODIUM BICARBONATE, SODIUM CHLORIDE, POTASSIUM CHLORIDE 236; 22.74; 6.74; 5.86; 2.97 G/4L; G/4L; G/4L; G/4L; G/4L
4000 POWDER, FOR SOLUTION ORAL ONCE
Status: COMPLETED | OUTPATIENT
Start: 2024-11-03 | End: 2024-11-03

## 2024-11-03 RX ORDER — POLYETHYLENE GLYCOL 3350, SODIUM SULFATE ANHYDROUS, SODIUM BICARBONATE, SODIUM CHLORIDE, POTASSIUM CHLORIDE 236; 22.74; 6.74; 5.86; 2.97 G/4L; G/4L; G/4L; G/4L; G/4L
4000 POWDER, FOR SOLUTION ORAL ONCE
Status: DISCONTINUED | OUTPATIENT
Start: 2024-11-03 | End: 2024-11-03

## 2024-11-03 RX ADMIN — METHOCARBAMOL 500 MG: 500 TABLET ORAL at 07:11

## 2024-11-03 RX ADMIN — ONDANSETRON HYDROCHLORIDE 4 MG: 4 TABLET, FILM COATED ORAL at 09:11

## 2024-11-03 RX ADMIN — DOXYCYCLINE HYCLATE 100 MG: 100 TABLET, COATED ORAL at 07:11

## 2024-11-03 RX ADMIN — FLUOXETINE HYDROCHLORIDE 40 MG: 20 CAPSULE ORAL at 09:11

## 2024-11-03 RX ADMIN — HYDROXYZINE HYDROCHLORIDE 25 MG: 25 TABLET, FILM COATED ORAL at 07:11

## 2024-11-03 RX ADMIN — Medication 6 MG: at 07:11

## 2024-11-03 RX ADMIN — DOXYCYCLINE HYCLATE 100 MG: 100 TABLET, COATED ORAL at 09:11

## 2024-11-03 RX ADMIN — POLYETHYLENE GLYCOL 3350, SODIUM SULFATE ANHYDROUS, SODIUM BICARBONATE, SODIUM CHLORIDE, POTASSIUM CHLORIDE 4000 ML: 236; 22.74; 6.74; 5.86; 2.97 POWDER, FOR SOLUTION ORAL at 04:11

## 2024-11-03 RX ADMIN — ISOSORBIDE MONONITRATE 60 MG: 60 TABLET, EXTENDED RELEASE ORAL at 09:11

## 2024-11-03 RX ADMIN — PANTOPRAZOLE SODIUM 40 MG: 40 TABLET, DELAYED RELEASE ORAL at 09:11

## 2024-11-03 RX ADMIN — METOPROLOL SUCCINATE 25 MG: 25 TABLET, EXTENDED RELEASE ORAL at 09:11

## 2024-11-03 NOTE — PROGRESS NOTES
David Mijares - Internal Medicine [facility] Medicine  Progress Note    Patient Name: Lorena Contreras  MRN: [id number]  Patient Class: IP- Inpatient   Admission Date: 10/30/2024  Length of Stay: 3 days  Attending Physician: Marita Haywood MD  Primary Care Provider: Jorge Paris MD        Subjective:     Principal Problem:C. difficile colitis        HPI:  Lorena Contreras is a 74y female with a pmhx of CKD, CAD (MI 2019), HTN, fibromyalgia, and lymphoma (s/p chemo, in remission) who presented today with large volume bright red blood with clots in her stool. In August she fell injuring her right hip and ankle which required surgical fixation. On discharge from SNF her ankle wound dehisced requiring surgical debridement. At time of that admission she was found to have c. Diff colitis. She was discharged on a 6 week course of doxycycline and PO vancomycin until completion of doxycyline course. Her diarrhea is still occurring every 2-3 hours and 3 days ago she developed nausea and vomiting. Her outpatient ID physician advised her to taper her PO vancomycin from q6h to BID. Yesterday her daughter noticed a darkening of her stools and today she was noted to have a pad filled with bright red blood and clots. While in the ED she developed abdominal pain localized to RLQ. No further bowel movements since and has remained NPO. She notes chills, anxiety, ankle pain, and new abdominal pain but denies any fevers, current dizziness, changes in vision, chest pains, vomiting, or extremity swelling. She reports feeling dizzy when arising in the morning for the past few weeks. Last dose of ASA and brilinta was this morning.     In the ED she was hemodynamically stable with a hgb of 8.8 near her baseline. BUN 67 and Cr 2.7 with baseline of 1.5. AST 51, ALT 37, ALKP 181 all at baseline. Troponin mildly elevated at 0.071. EKG with no acute changes.         Overview/Hospital Course:  Admitted to Hospital Medicine for  further evaluation and management of C.Diff colitis.  Placed in the proper contact precautions. Patient's doxycycline was continued from previous regimen.GI consulted for evaluation of lower GI bleed. Plan for colonoscopy Monday. ID consulted. Patient was initially continued on her Vancomycin but then transitioned to Fidaxomicin on 10/31. Cdiff panel negative. Fidaxomicin was discontinued and patient was started back on PO Vancomycin 125mg daily for secondary prophylaxis while on the Doxycycline.     Interval History: NAEON. VSS, afebrile. Endorses mild epigastric pain overnight that resolved on its own. No bloody bowel movements overnight. Plan for colonoscopy tomorrow. NPO at midnight.     Review of Systems   Constitutional:  Negative for chills, fatigue and unexpected weight change.   HENT:  Negative for congestion, sinus pain and sore throat.    Respiratory:  Negative for cough, chest tightness and shortness of breath.    Cardiovascular:  Negative for chest pain, palpitations and leg swelling.   Gastrointestinal:  Negative for abdominal pain, diarrhea, nausea and vomiting.     Objective:     Vital Signs (Most Recent):  Temp: 98 °F (36.7 °C) (11/03/24 0741)  Pulse: 85 (11/03/24 1055)  Resp: 18 (11/03/24 0741)  BP: (!) 161/82 (11/03/24 0741)  SpO2: 99 % (11/03/24 0741) Vital Signs (24h Range):  Temp:  [98 °F (36.7 °C)-99 °F (37.2 °C)] 98 °F (36.7 °C)  Pulse:  [75-96] 85  Resp:  [18] 18  SpO2:  [98 %-100 %] 99 %  BP: (119-166)/(62-82) 161/82     Weight: 86 kg (189 lb 9.5 oz)  Body mass index is 28.83 kg/m².    Intake/Output Summary (Last 24 hours) at 11/3/2024 1300  Last data filed at 11/3/2024 1006  Gross per 24 hour   Intake 860 ml   Output --   Net 860 ml         Physical Exam  Constitutional:       General: She is not in acute distress.     Appearance: Normal appearance. She is ill-appearing.   HENT:      Head: Normocephalic.      Right Ear: External ear normal.      Left Ear: External ear normal.       Mouth/Throat:      Mouth: Mucous membranes are moist.      Pharynx: Oropharynx is clear.   Eyes:      General: No scleral icterus.     Extraocular Movements: Extraocular movements intact.   Pulmonary:      Effort: Pulmonary effort is normal.   Musculoskeletal:      Right lower leg: No edema.      Left lower leg: No edema.      Comments: R ankle wrapped in brace   Skin:     General: Skin is warm.      Coloration: Skin is pale. Skin is not jaundiced.      Findings: No erythema or rash.   Neurological:      General: No focal deficit present.      Mental Status: She is alert and oriented to person, place, and time. Mental status is at baseline.   Psychiatric:         Mood and Affect: Mood normal.         Behavior: Behavior normal.         Thought Content: Thought content normal.             Significant Labs: All pertinent labs within the past 24 hours have been reviewed.  CBC:   Recent Labs   Lab 11/02/24 0429 11/03/24  0234   WBC 7.36 7.83   HGB 7.8* 7.7*   HCT 23.7* 24.5*    191     CMP:   Recent Labs   Lab 11/02/24 0429 11/03/24 0234    138   K 3.3* 4.1    109   CO2 23 20*   * 125*   BUN 48* 36*   CREATININE 2.2* 1.7*   CALCIUM 8.9 9.0   PROT 5.7* 5.9*   ALBUMIN 2.2* 2.2*   BILITOT 0.3 0.3   ALKPHOS 167* 171*   AST 39 45*   ALT 27 29   ANIONGAP 9 9       Significant Imaging: I have reviewed all pertinent imaging results/findings within the past 24 hours.    Assessment/Plan:      * C. difficile colitis  Diagnosed with C. Diff on admission from 9/24-10/7, likely obtained from outpatient rehab facility. She has had persistent watery diarrhea since discharge every 2-3 hours. Discharged on PO vancomycin until 11/7, but taper started early on 10/27 after she developed nausea and vomiting. N/V now resolved but noted a slightly bloody stool on 10/29 and gross bright red blood with clots on 10/30 prompting presentation to ED. She has been on a course of doxycycline as well for ankle surgical  infection.    Plan:  - Cdiff panel negative. ID following, appreciate recs  - Continue PO Vancomycin 125mg daily for secondary prophylaxis while on the Doxycycline.    - Consult to GI for hematochezia evaluation, appreciate recs  - Plan for colonoscopy tomorrow.   - NPO at midnight   - daily cbc/cmp  - no probiotics due to persistent glucose drops while taking them  - no zofran due to elevated qtc, can trial scopolamine patch if nauseous  - hold ASA/brilinta    Coronary artery disease  Prior MI in 2019. Remains on ASA/Brilinta. Was placed on warfarin post-op but has since stopped. Trop mildly elevated on admission. No acute EKG changes or chest pain.     Noted to have fluid retention in extremities since her fall. Started on 40mg lasix daily. Last echo from 11/2023. Never diagnosed with HF. No SOB or pitting edema indicating excess fluid at this time.      Plan:  - Chronic.   - Trop peaked at 0.096.   - TTE w/EF 60-65%. Mild Pulmonary HTN.   - if develops chest pain - stat EKG, repeat trop  - hold ASA/brilinta in setting of ongoing GI bleed  - hold lasix for bobbi    Elevated troponin level not due myocardial infarction  Troponin on admission 0.071. EKG only notable for prior infarct. No chest pain or SOB. Likely demand ischemia.    Plan:  Trops peaked at 0.096 then downtrended. Resolved.       Anemia  Chronic anemia likely exacerbated by acute blood loss. Most recent hemoglobin and hematocrit are listed below.  Recent Labs     11/01/24  0526 11/02/24  0429 11/03/24  0234   HGB 7.6* 7.8* 7.7*   HCT 24.1* 23.7* 24.5*       Patient has not received any transfusions this admission, however required transfusions on recent admissions    Plan  - daily cbc  - Transfuse PRBC if patient becomes hemodynamically unstable, symptomatic or H/H drops below 7/21. Will transfuse earlier if gross hematochezia continues  - Patient's anemia is currently  being monitored      Rectal bleeding  See C. Diff colitis    Wound dehiscence, right  ankle  Fell on 8/14 with resultant right ankle and hip fracture. Underwent surgical fixation and was discharged to rehab. On discharge from rehab R ankle wound dehisced. Underwent wound debridement during readmission from 9/24-10/7. Ankle remains wrapped and braced with severe pain relieved by robaxin. Notes shooting pain but previously was unable to tolerate gabapentin. On 6 week doxycycline course until 11/12 complicated by persistent C. Diff colitis.    Plan:  - consult wound care  - Right ankle incision: bedside nursing to cleanse with NS, pat dry, apply a bordered island dressing to the wound, cover with cast padding and loosely secure with an elastic bandage every 3 days   - consult to ID for abx recs  - continue doxycycline course. Added a PPI due to esophagitis symptoms.   - pain control with oxy 5 q8h prn      Stage 3b chronic kidney disease  Creatine stable for now. BMP reviewed- noted Estimated Creatinine Clearance: 21.8 mL/min (A) (based on SCr of 2.7 mg/dL (H)). according to latest data. Based on current GFR, CKD stage is stage 3 - GFR 30-59 with acute KYA.      Plan:  - Monitor UOP and serial BMP and adjust therapy as needed.   - Renally dose meds.   - Avoid nephrotoxic medications and procedures.  - hold lasix    Type 2 diabetes mellitus with stage 3b chronic kidney disease, with long-term current use of insulin    Lab Results   Component Value Date    HGBA1C 8.2 (H) 07/10/2024     Home regimen: On 12U lantus nightly with SSI at meals.      - Hold oral anti-glycemics while hospitalized  - low dose SSI  - Accu-checks q AC/HS  - Goal glucose 140-180  - Titrate insulin regimen as needed.   - Diabetic diet as tolerated  - Hypoglycemia protocol in place  - If blood glucose greater than 300, please ask patient not to eat food or drink anything other than water until correctional insulin has brought it back below 250  - If patient is NPO, please hold pre-meal Aspart (Daily Detemir/basal insulin is ok to give  even if patient is NPO)       KYA (acute kidney injury)  KYA is likely due to pre-renal azotemia due to intravascular volume depletion. Baseline creatinine is  1.5 . Most recent creatinine and eGFR are listed below.  Recent Labs     11/01/24  0526 11/02/24  0429 11/03/24  0234   CREATININE 2.3* 2.2* 1.7*   EGFRNORACEVR 21.8* 23.0* 31.3*       Likely pre-renal in the setting of acute blood loss     Plan  - KYA is  being monitored . Improving.   - Avoid nephrotoxins and renally dose meds for GFR listed above  - Monitor urine output, daily cmp, and adjust therapy as needed  - hold home lasix    Mixed hyperlipidemia  Statin previously held for chronic elevation in LFTs. Will not resume at this time.    Primary hypertension  Patients blood pressure range in the last 24 hours was: BP  Min: 99/54  Max: 130/61.The patient's inpatient anti-hypertensive regimen is listed below:  Current Antihypertensives  hydrALAZINE tablet 100 mg, Every 8 hours, Oral  isosorbide mononitrate 24 hr tablet 60 mg, Daily, Oral  metoprolol succinate (TOPROL-XL) 24 hr tablet 25 mg, Daily, Oral    Plan  - BP is controlled, no changes needed to their regimen  - avoid nephrotoxic antihypertensives    Anxiety  Chronic poorly controlled anxiety requiring PRN ativan near daily. Exacerbated by ongoing health concerns.    Plan:  - continue home fluoxetine  - PRN ativan  - hold home seroquel for elevated qtc, trial of melatonin and daily ekg      VTE Risk Mitigation (From admission, onward)           Ordered     Reason for No Pharmacological VTE Prophylaxis  Once        Question:  Reasons:  Answer:  Active Bleeding    10/30/24 2302     IP VTE HIGH RISK PATIENT  Once         10/30/24 2302     Place sequential compression device  Until discontinued         10/30/24 2302                    Discharge Planning   MIKHAIL: 11/5/2024     Code Status: Partial Code   Is the patient medically ready for discharge?:     Reason for patient still in hospital (select all that  apply): Patient trending condition  Discharge Plan A: Home Health                  Ericka Manning MD  Department of Hospital Medicine   [facility] - Internal Medicine Telemetry

## 2024-11-03 NOTE — SUBJECTIVE & OBJECTIVE
Interval History: NAEON. VSS, afebrile. Endorses mild epigastric pain overnight that resolved on its own. No bloody bowel movements overnight. Plan for colonoscopy tomorrow. NPO at midnight.     Review of Systems   Constitutional:  Negative for chills, fatigue and unexpected weight change.   HENT:  Negative for congestion, sinus pain and sore throat.    Respiratory:  Negative for cough, chest tightness and shortness of breath.    Cardiovascular:  Negative for chest pain, palpitations and leg swelling.   Gastrointestinal:  Negative for abdominal pain, diarrhea, nausea and vomiting.     Objective:     Vital Signs (Most Recent):  Temp: 98 °F (36.7 °C) (11/03/24 0741)  Pulse: 85 (11/03/24 1055)  Resp: 18 (11/03/24 0741)  BP: (!) 161/82 (11/03/24 0741)  SpO2: 99 % (11/03/24 0741) Vital Signs (24h Range):  Temp:  [98 °F (36.7 °C)-99 °F (37.2 °C)] 98 °F (36.7 °C)  Pulse:  [75-96] 85  Resp:  [18] 18  SpO2:  [98 %-100 %] 99 %  BP: (119-166)/(62-82) 161/82     Weight: 86 kg (189 lb 9.5 oz)  Body mass index is 28.83 kg/m².    Intake/Output Summary (Last 24 hours) at 11/3/2024 1300  Last data filed at 11/3/2024 1006  Gross per 24 hour   Intake 860 ml   Output --   Net 860 ml         Physical Exam  Constitutional:       General: She is not in acute distress.     Appearance: Normal appearance. She is ill-appearing.   HENT:      Head: Normocephalic.      Right Ear: External ear normal.      Left Ear: External ear normal.      Mouth/Throat:      Mouth: Mucous membranes are moist.      Pharynx: Oropharynx is clear.   Eyes:      General: No scleral icterus.     Extraocular Movements: Extraocular movements intact.   Pulmonary:      Effort: Pulmonary effort is normal.   Musculoskeletal:      Right lower leg: No edema.      Left lower leg: No edema.      Comments: R ankle wrapped in brace   Skin:     General: Skin is warm.      Coloration: Skin is pale. Skin is not jaundiced.      Findings: No erythema or rash.   Neurological:       General: No focal deficit present.      Mental Status: She is alert and oriented to person, place, and time. Mental status is at baseline.   Psychiatric:         Mood and Affect: Mood normal.         Behavior: Behavior normal.         Thought Content: Thought content normal.             Significant Labs: All pertinent labs within the past 24 hours have been reviewed.  CBC:   Recent Labs   Lab 11/02/24 0429 11/03/24  0234   WBC 7.36 7.83   HGB 7.8* 7.7*   HCT 23.7* 24.5*    191     CMP:   Recent Labs   Lab 11/02/24 0429 11/03/24  0234    138   K 3.3* 4.1    109   CO2 23 20*   * 125*   BUN 48* 36*   CREATININE 2.2* 1.7*   CALCIUM 8.9 9.0   PROT 5.7* 5.9*   ALBUMIN 2.2* 2.2*   BILITOT 0.3 0.3   ALKPHOS 167* 171*   AST 39 45*   ALT 27 29   ANIONGAP 9 9       Significant Imaging: I have reviewed all pertinent imaging results/findings within the past 24 hours.

## 2024-11-03 NOTE — ASSESSMENT & PLAN NOTE
Fell on 8/14 with resultant right ankle and hip fracture. Underwent surgical fixation and was discharged to rehab. On discharge from rehab R ankle wound dehisced. Underwent wound debridement during readmission from 9/24-10/7. Ankle remains wrapped and braced with severe pain relieved by robaxin. Notes shooting pain but previously was unable to tolerate gabapentin. On 6 week doxycycline course until 11/12 complicated by persistent C. Diff colitis.    Plan:  - consult wound care  - Right ankle incision: bedside nursing to cleanse with NS, pat dry, apply a bordered island dressing to the wound, cover with cast padding and loosely secure with an elastic bandage every 3 days   - consult to ID for abx recs  - continue doxycycline course. Added a PPI due to esophagitis symptoms.   - pain control with oxy 5 q8h prn

## 2024-11-03 NOTE — PLAN OF CARE
Problem: Adult Inpatient Plan of Care  Goal: Plan of Care Review  Outcome: Progressing     Problem: Adult Inpatient Plan of Care  Goal: Patient-Specific Goal (Individualized)  Outcome: Progressing     Problem: Infection  Goal: Absence of Infection Signs and Symptoms  Outcome: Progressing     Problem: Mobility Impairment  Goal: Optimal Mobility  Outcome: Progressing     Problem: Pain Acute  Goal: Optimal Pain Control and Function  Outcome: Progressing     Problem: Diarrhea  Goal: Effective Diarrhea Management  Outcome: Progressing

## 2024-11-03 NOTE — ASSESSMENT & PLAN NOTE
Diagnosed with C. Diff on admission from 9/24-10/7, likely obtained from outpatient rehab facility. She has had persistent watery diarrhea since discharge every 2-3 hours. Discharged on PO vancomycin until 11/7, but taper started early on 10/27 after she developed nausea and vomiting. N/V now resolved but noted a slightly bloody stool on 10/29 and gross bright red blood with clots on 10/30 prompting presentation to ED. She has been on a course of doxycycline as well for ankle surgical infection.    Plan:  - Cdiff panel negative. ID following, appreciate recs  - Continue PO Vancomycin 125mg daily for secondary prophylaxis while on the Doxycycline.    - Consult to GI for hematochezia evaluation, appreciate recs  - Plan for colonoscopy tomorrow.   - NPO at midnight   - daily cbc/cmp  - no probiotics due to persistent glucose drops while taking them  - no zofran due to elevated qtc, can trial scopolamine patch if nauseous  - hold ASA/brilinta

## 2024-11-03 NOTE — ASSESSMENT & PLAN NOTE
KYA is likely due to pre-renal azotemia due to intravascular volume depletion. Baseline creatinine is  1.5 . Most recent creatinine and eGFR are listed below.  Recent Labs     11/01/24  0526 11/02/24  0429 11/03/24  0234   CREATININE 2.3* 2.2* 1.7*   EGFRNORACEVR 21.8* 23.0* 31.3*       Likely pre-renal in the setting of acute blood loss     Plan  - KYA is  being monitored . Improving.   - Avoid nephrotoxins and renally dose meds for GFR listed above  - Monitor urine output, daily cmp, and adjust therapy as needed  - hold home lasix

## 2024-11-03 NOTE — ASSESSMENT & PLAN NOTE
Chronic anemia likely exacerbated by acute blood loss. Most recent hemoglobin and hematocrit are listed below.  Recent Labs     11/01/24  0526 11/02/24  0429 11/03/24  0234   HGB 7.6* 7.8* 7.7*   HCT 24.1* 23.7* 24.5*       Patient has not received any transfusions this admission, however required transfusions on recent admissions    Plan  - daily cbc  - Transfuse PRBC if patient becomes hemodynamically unstable, symptomatic or H/H drops below 7/21. Will transfuse earlier if gross hematochezia continues  - Patient's anemia is currently  being monitored

## 2024-11-04 ENCOUNTER — PATIENT MESSAGE (OUTPATIENT)
Dept: ORTHOPEDICS | Facility: CLINIC | Age: 74
End: 2024-11-04
Payer: MEDICARE

## 2024-11-04 ENCOUNTER — ANESTHESIA EVENT (OUTPATIENT)
Dept: ENDOSCOPY | Facility: HOSPITAL | Age: 74
DRG: 372 | End: 2024-11-04
Payer: MEDICARE

## 2024-11-04 ENCOUNTER — ANESTHESIA (OUTPATIENT)
Dept: ENDOSCOPY | Facility: HOSPITAL | Age: 74
DRG: 372 | End: 2024-11-04
Payer: MEDICARE

## 2024-11-04 LAB
ALBUMIN SERPL BCP-MCNC: 2.2 G/DL (ref 3.5–5.2)
ALP SERPL-CCNC: 175 U/L (ref 40–150)
ALT SERPL W/O P-5'-P-CCNC: 33 U/L (ref 10–44)
ANION GAP SERPL CALC-SCNC: 11 MMOL/L (ref 8–16)
AST SERPL-CCNC: 47 U/L (ref 10–40)
BASOPHILS # BLD AUTO: 0.07 K/UL (ref 0–0.2)
BASOPHILS NFR BLD: 0.8 % (ref 0–1.9)
BILIRUB SERPL-MCNC: 0.3 MG/DL (ref 0.1–1)
BUN SERPL-MCNC: 25 MG/DL (ref 8–23)
CALCIUM SERPL-MCNC: 9.3 MG/DL (ref 8.7–10.5)
CHLORIDE SERPL-SCNC: 109 MMOL/L (ref 95–110)
CO2 SERPL-SCNC: 21 MMOL/L (ref 23–29)
CREAT SERPL-MCNC: 1.6 MG/DL (ref 0.5–1.4)
DIFFERENTIAL METHOD BLD: ABNORMAL
EOSINOPHIL # BLD AUTO: 0.2 K/UL (ref 0–0.5)
EOSINOPHIL NFR BLD: 2.4 % (ref 0–8)
ERYTHROCYTE [DISTWIDTH] IN BLOOD BY AUTOMATED COUNT: 14.4 % (ref 11.5–14.5)
EST. GFR  (NO RACE VARIABLE): 33.6 ML/MIN/1.73 M^2
GLUCOSE SERPL-MCNC: 122 MG/DL (ref 70–110)
HCT VFR BLD AUTO: 25.6 % (ref 37–48.5)
HGB BLD-MCNC: 8.4 G/DL (ref 12–16)
IMM GRANULOCYTES # BLD AUTO: 0.06 K/UL (ref 0–0.04)
IMM GRANULOCYTES NFR BLD AUTO: 0.7 % (ref 0–0.5)
LYMPHOCYTES # BLD AUTO: 1.7 K/UL (ref 1–4.8)
LYMPHOCYTES NFR BLD: 20 % (ref 18–48)
MAGNESIUM SERPL-MCNC: 1.5 MG/DL (ref 1.6–2.6)
MCH RBC QN AUTO: 28.8 PG (ref 27–31)
MCHC RBC AUTO-ENTMCNC: 32.8 G/DL (ref 32–36)
MCV RBC AUTO: 88 FL (ref 82–98)
MONOCYTES # BLD AUTO: 0.8 K/UL (ref 0.3–1)
MONOCYTES NFR BLD: 9.3 % (ref 4–15)
NEUTROPHILS # BLD AUTO: 5.7 K/UL (ref 1.8–7.7)
NEUTROPHILS NFR BLD: 66.8 % (ref 38–73)
NRBC BLD-RTO: 0 /100 WBC
PHOSPHATE SERPL-MCNC: 3.3 MG/DL (ref 2.7–4.5)
PLATELET # BLD AUTO: 221 K/UL (ref 150–450)
PMV BLD AUTO: 13.1 FL (ref 9.2–12.9)
POTASSIUM SERPL-SCNC: 3.7 MMOL/L (ref 3.5–5.1)
PROT SERPL-MCNC: 6 G/DL (ref 6–8.4)
RBC # BLD AUTO: 2.92 M/UL (ref 4–5.4)
SODIUM SERPL-SCNC: 141 MMOL/L (ref 136–145)
WBC # BLD AUTO: 8.48 K/UL (ref 3.9–12.7)

## 2024-11-04 PROCEDURE — 93010 ELECTROCARDIOGRAM REPORT: CPT | Mod: HCNC,,, | Performed by: INTERNAL MEDICINE

## 2024-11-04 PROCEDURE — 83735 ASSAY OF MAGNESIUM: CPT | Mod: HCNC

## 2024-11-04 PROCEDURE — 63600175 PHARM REV CODE 636 W HCPCS: Mod: HCNC

## 2024-11-04 PROCEDURE — 25000003 PHARM REV CODE 250: Mod: HCNC

## 2024-11-04 PROCEDURE — 45380 COLONOSCOPY AND BIOPSY: CPT | Mod: 59,HCNC | Performed by: INTERNAL MEDICINE

## 2024-11-04 PROCEDURE — 37000009 HC ANESTHESIA EA ADD 15 MINS: Mod: HCNC | Performed by: INTERNAL MEDICINE

## 2024-11-04 PROCEDURE — 80053 COMPREHEN METABOLIC PANEL: CPT | Mod: HCNC

## 2024-11-04 PROCEDURE — 88305 TISSUE EXAM BY PATHOLOGIST: CPT | Mod: HCNC | Performed by: PATHOLOGY

## 2024-11-04 PROCEDURE — 27201089 HC SNARE, DISP (ANY): Mod: HCNC | Performed by: INTERNAL MEDICINE

## 2024-11-04 PROCEDURE — 84100 ASSAY OF PHOSPHORUS: CPT | Mod: HCNC

## 2024-11-04 PROCEDURE — 82962 GLUCOSE BLOOD TEST: CPT | Mod: HCNC | Performed by: INTERNAL MEDICINE

## 2024-11-04 PROCEDURE — 45385 COLONOSCOPY W/LESION REMOVAL: CPT | Mod: HCNC,GC,, | Performed by: INTERNAL MEDICINE

## 2024-11-04 PROCEDURE — 63600175 PHARM REV CODE 636 W HCPCS: Mod: HCNC | Performed by: NURSE ANESTHETIST, CERTIFIED REGISTERED

## 2024-11-04 PROCEDURE — 45380 COLONOSCOPY AND BIOPSY: CPT | Mod: 59,HCNC,GC, | Performed by: INTERNAL MEDICINE

## 2024-11-04 PROCEDURE — 37000008 HC ANESTHESIA 1ST 15 MINUTES: Mod: HCNC | Performed by: INTERNAL MEDICINE

## 2024-11-04 PROCEDURE — D9220A PRA ANESTHESIA: Mod: HCNC,CRNA,, | Performed by: NURSE ANESTHETIST, CERTIFIED REGISTERED

## 2024-11-04 PROCEDURE — 93005 ELECTROCARDIOGRAM TRACING: CPT | Mod: HCNC

## 2024-11-04 PROCEDURE — 45385 COLONOSCOPY W/LESION REMOVAL: CPT | Mod: HCNC | Performed by: INTERNAL MEDICINE

## 2024-11-04 PROCEDURE — 0DBE8ZX EXCISION OF LARGE INTESTINE, VIA NATURAL OR ARTIFICIAL OPENING ENDOSCOPIC, DIAGNOSTIC: ICD-10-PCS | Performed by: INTERNAL MEDICINE

## 2024-11-04 PROCEDURE — 0DBK8ZZ EXCISION OF ASCENDING COLON, VIA NATURAL OR ARTIFICIAL OPENING ENDOSCOPIC: ICD-10-PCS | Performed by: INTERNAL MEDICINE

## 2024-11-04 PROCEDURE — 94761 N-INVAS EAR/PLS OXIMETRY MLT: CPT | Mod: HCNC

## 2024-11-04 PROCEDURE — 27201012 HC FORCEPS, HOT/COLD, DISP: Mod: HCNC | Performed by: INTERNAL MEDICINE

## 2024-11-04 PROCEDURE — 21400001 HC TELEMETRY ROOM: Mod: HCNC

## 2024-11-04 PROCEDURE — 27000207 HC ISOLATION: Mod: HCNC

## 2024-11-04 PROCEDURE — D9220A PRA ANESTHESIA: Mod: HCNC,ANES,, | Performed by: ANESTHESIOLOGY

## 2024-11-04 PROCEDURE — 36415 COLL VENOUS BLD VENIPUNCTURE: CPT | Mod: HCNC

## 2024-11-04 PROCEDURE — 25000003 PHARM REV CODE 250: Mod: HCNC | Performed by: NURSE ANESTHETIST, CERTIFIED REGISTERED

## 2024-11-04 PROCEDURE — 25000003 PHARM REV CODE 250: Mod: HCNC | Performed by: STUDENT IN AN ORGANIZED HEALTH CARE EDUCATION/TRAINING PROGRAM

## 2024-11-04 PROCEDURE — 85025 COMPLETE CBC W/AUTO DIFF WBC: CPT | Mod: HCNC

## 2024-11-04 PROCEDURE — 25000003 PHARM REV CODE 250: Mod: HCNC | Performed by: HOSPITALIST

## 2024-11-04 PROCEDURE — 0DBL8ZZ EXCISION OF TRANSVERSE COLON, VIA NATURAL OR ARTIFICIAL OPENING ENDOSCOPIC: ICD-10-PCS | Performed by: INTERNAL MEDICINE

## 2024-11-04 RX ORDER — MAGNESIUM SULFATE HEPTAHYDRATE 40 MG/ML
2 INJECTION, SOLUTION INTRAVENOUS ONCE
Status: COMPLETED | OUTPATIENT
Start: 2024-11-04 | End: 2024-11-04

## 2024-11-04 RX ORDER — ONDANSETRON HYDROCHLORIDE 2 MG/ML
INJECTION, SOLUTION INTRAVENOUS
Status: DISCONTINUED | OUTPATIENT
Start: 2024-11-04 | End: 2024-11-04

## 2024-11-04 RX ORDER — SUCCINYLCHOLINE CHLORIDE 20 MG/ML
INJECTION INTRAMUSCULAR; INTRAVENOUS
Status: DISCONTINUED | OUTPATIENT
Start: 2024-11-04 | End: 2024-11-04

## 2024-11-04 RX ORDER — PANTOPRAZOLE SODIUM 40 MG/1
40 TABLET, DELAYED RELEASE ORAL DAILY
Qty: 90 TABLET | Refills: 3 | Status: SHIPPED | OUTPATIENT
Start: 2024-11-05 | End: 2025-11-05

## 2024-11-04 RX ORDER — GLUCAGON 1 MG
1 KIT INJECTION
Status: DISCONTINUED | OUTPATIENT
Start: 2024-11-04 | End: 2024-11-04 | Stop reason: HOSPADM

## 2024-11-04 RX ORDER — LIDOCAINE HYDROCHLORIDE 20 MG/ML
INJECTION INTRAVENOUS
Status: DISCONTINUED | OUTPATIENT
Start: 2024-11-04 | End: 2024-11-04

## 2024-11-04 RX ORDER — ETOMIDATE 2 MG/ML
INJECTION INTRAVENOUS
Status: DISCONTINUED | OUTPATIENT
Start: 2024-11-04 | End: 2024-11-04

## 2024-11-04 RX ORDER — PROPOFOL 10 MG/ML
VIAL (ML) INTRAVENOUS CONTINUOUS PRN
Status: DISCONTINUED | OUTPATIENT
Start: 2024-11-04 | End: 2024-11-04

## 2024-11-04 RX ORDER — PROPOFOL 10 MG/ML
VIAL (ML) INTRAVENOUS
Status: DISCONTINUED | OUTPATIENT
Start: 2024-11-04 | End: 2024-11-04

## 2024-11-04 RX ORDER — HALOPERIDOL 5 MG/ML
0.5 INJECTION INTRAMUSCULAR EVERY 10 MIN PRN
Status: DISCONTINUED | OUTPATIENT
Start: 2024-11-04 | End: 2024-11-04 | Stop reason: HOSPADM

## 2024-11-04 RX ORDER — SODIUM CHLORIDE 0.9 % (FLUSH) 0.9 %
10 SYRINGE (ML) INJECTION
Status: DISCONTINUED | OUTPATIENT
Start: 2024-11-04 | End: 2024-11-04 | Stop reason: HOSPADM

## 2024-11-04 RX ADMIN — Medication 6 MG: at 08:11

## 2024-11-04 RX ADMIN — METOPROLOL SUCCINATE 25 MG: 25 TABLET, EXTENDED RELEASE ORAL at 12:11

## 2024-11-04 RX ADMIN — DOXYCYCLINE HYCLATE 100 MG: 100 TABLET, COATED ORAL at 08:11

## 2024-11-04 RX ADMIN — FLUOXETINE HYDROCHLORIDE 40 MG: 20 CAPSULE ORAL at 12:11

## 2024-11-04 RX ADMIN — METHOCARBAMOL 500 MG: 500 TABLET ORAL at 12:11

## 2024-11-04 RX ADMIN — VANCOMYCIN HYDROCHLORIDE 125 MG: KIT at 12:11

## 2024-11-04 RX ADMIN — LIDOCAINE HYDROCHLORIDE 100 MG: 20 INJECTION INTRAVENOUS at 10:11

## 2024-11-04 RX ADMIN — HYDROXYZINE HYDROCHLORIDE 25 MG: 25 TABLET, FILM COATED ORAL at 12:11

## 2024-11-04 RX ADMIN — METHOCARBAMOL 500 MG: 500 TABLET ORAL at 08:11

## 2024-11-04 RX ADMIN — ISOSORBIDE MONONITRATE 60 MG: 60 TABLET, EXTENDED RELEASE ORAL at 12:11

## 2024-11-04 RX ADMIN — ONDANSETRON 4 MG: 2 INJECTION INTRAMUSCULAR; INTRAVENOUS at 10:11

## 2024-11-04 RX ADMIN — MAGNESIUM SULFATE HEPTAHYDRATE 2 G: 40 INJECTION, SOLUTION INTRAVENOUS at 12:11

## 2024-11-04 RX ADMIN — OXYCODONE 5 MG: 5 TABLET ORAL at 12:11

## 2024-11-04 RX ADMIN — HYDROXYZINE HYDROCHLORIDE 25 MG: 25 TABLET, FILM COATED ORAL at 08:11

## 2024-11-04 RX ADMIN — PANTOPRAZOLE SODIUM 40 MG: 40 TABLET, DELAYED RELEASE ORAL at 12:11

## 2024-11-04 RX ADMIN — ETOMIDATE 10 MG: 2 INJECTION INTRAVENOUS at 10:11

## 2024-11-04 RX ADMIN — SUCCINYLCHOLINE CHLORIDE 120 MG: 20 INJECTION, SOLUTION INTRAMUSCULAR; INTRAVENOUS at 10:11

## 2024-11-04 RX ADMIN — PROPOFOL 50 MG: 10 INJECTION, EMULSION INTRAVENOUS at 10:11

## 2024-11-04 RX ADMIN — PROPOFOL 100 MCG/KG/MIN: 10 INJECTION, EMULSION INTRAVENOUS at 10:11

## 2024-11-04 RX ADMIN — DOXYCYCLINE HYCLATE 100 MG: 100 TABLET, COATED ORAL at 12:11

## 2024-11-04 NOTE — ASSESSMENT & PLAN NOTE
Troponin on admission 0.071. EKG only notable for prior infarct. No chest pain or SOB. Likely demand ischemia.    Plan:  Trops peaked at 0.096 then downtrended. Resolved.

## 2024-11-04 NOTE — ASSESSMENT & PLAN NOTE
KYA is likely due to pre-renal azotemia due to intravascular volume depletion. Baseline creatinine is  1.5 . Most recent creatinine and eGFR are listed below.  Recent Labs     11/02/24  0429 11/03/24  0234 11/04/24  0558   CREATININE 2.2* 1.7* 1.6*   EGFRNORACEVR 23.0* 31.3* 33.6*       Likely pre-renal in the setting of acute blood loss     Plan  - KYA is  being monitored . Improving.   - Avoid nephrotoxins and renally dose meds for GFR listed above  - Monitor urine output, daily cmp, and adjust therapy as needed  - hold home lasix

## 2024-11-04 NOTE — ANESTHESIA PREPROCEDURE EVALUATION
11/04/2024  Lorena Contreras is a 74 y.o., female.    Pre-operative evaluation for Procedure(s) (LRB):  COLONOSCOPY (N/A)    Lorena Contreras is a 74 y.o. female     Patient Active Problem List   Diagnosis    Anxiety    Primary hypertension    Follicular lymphoma    Mixed hyperlipidemia    Mild nonproliferative diabetic retinopathy of both eyes associated with type 2 diabetes mellitus    Coronary artery disease of native artery of native heart with stable angina pectoris    Pulmonary hypertension -- echo 11/2019    Peripheral vascular disease in diabetes mellitus -- CLEMENT 05/2019    Iron deficiency anemia    History of TIA (transient ischemic attack)    RLS (restless legs syndrome)    Aortic atherosclerosis    Osteopenia of neck of femur    Proliferative diabetic retinopathy of left eye associated with type 2 diabetes mellitus, unspecified proliferative retinopathy type    Status post tooth extraction    KYA (acute kidney injury)    Type 2 diabetes mellitus with stage 3b chronic kidney disease, with long-term current use of insulin    Post herpetic neuralgia    Aortic stenosis    Chronic bronchitis, unspecified chronic bronchitis type    Stage 3b chronic kidney disease    Closed fracture of right iliac wing    Multiple fractures of pelvis    Closed displaced fracture of right acetabulum    Closed right pilon fracture    Closed fracture of superior and inferior rami of right pubis    Acute blood loss anemia    Tremor    Hypoalbuminemia    Wound dehiscence, right ankle    Hypokalemia    C. difficile colitis    Neck muscle spasm    Rectal bleeding    Anemia    Elevated troponin level not due myocardial infarction    Coronary artery disease       Review of patient's allergies indicates:   Allergen Reactions    Novolin 70/30 (semi-synthetic) Nausea And Vomiting     Severe vomiting on Relion 70/30    Sulfa  (sulfonamide antibiotics) Anaphylaxis    Talwin [pentazocine lactate] Anaphylaxis    Victoza [liraglutide] Nausea And Vomiting    Glipizide Nausea Only    Codeine     Influenza virus vaccines Hives    Iodine and iodide containing products Hives    Levetiracetam Itching    Neurontin [gabapentin]      Possible associated myoclonic jerk    Rituxan [rituximab] Hives    Zoloft [sertraline] Nausea And Vomiting       No current facility-administered medications on file prior to encounter.     Current Outpatient Medications on File Prior to Encounter   Medication Sig Dispense Refill    aspirin 81 MG Chew Take 1 tablet (81 mg total) by mouth once daily. Hold to avoid bleed risk on triple therapy and then resume on oct 27 once eliquis stops on oct 26th      BRILINTA 90 mg tablet Take 1 tablet by mouth twice daily 180 tablet 0    DEXCOM G7  Misc Use 1  to track blood glucose, ICD10: E11.65 1 each 0    DEXCOM G7 SENSOR Samina Use 1 sensor every 10 days to track blood glucose, ICD10: E11.65, okay with 90 day supply if possible 3 each 11    doxycycline (VIBRA-TABS) 100 MG tablet Take 1 tablet (100 mg total) by mouth every 12 (twelve) hours. End date 11/7/24 74 tablet 0    FLUoxetine 40 MG capsule Take 1 capsule (40 mg total) by mouth once daily. 90 capsule 3    furosemide (LASIX) 40 MG tablet Take 1 tablet (40 mg total) by mouth 2 (two) times daily. 60 tablet 2    hydrALAZINE (APRESOLINE) 100 MG tablet Take 1 tablet (100 mg total) by mouth every 8 (eight) hours. 135 tablet 3    insulin aspart U-100 (NOVOLOG) 100 unit/mL (3 mL) InPn pen Inject 0-10 Units into the skin before meals and at bedtime as needed (Hyperglycemia).      insulin glargine U-100, Lantus, 100 unit/mL (3 mL) SubQ InPn pen Inject 12 Units into the skin every evening.      isosorbide mononitrate (IMDUR) 60 MG 24 hr tablet Take 1 tablet (60 mg total) by mouth once daily.      LORazepam (ATIVAN) 1 MG tablet TAKE 1 TABLET BY MOUTH ONCE DAILY AS NEEDED  "FOR ANXIETY 30 tablet 0    metoprolol succinate (TOPROL-XL) 25 MG 24 hr tablet Take 1 tablet (25 mg total) by mouth once daily. 90 tablet 3    ondansetron (ZOFRAN-ODT) 8 MG TbDL Dissolve 1 tablet (8 mg total) by mouth every 8 (eight) hours as needed (nausea). 20 tablet 2    oxyCODONE (ROXICODONE) 5 MG immediate release tablet Take 1 tablet (5 mg total) by mouth every 8 (eight) hours as needed (pain scale 4-6). 21 tablet 0    pen needle, diabetic (BD ULTRA-FINE SAGAR PEN NEEDLE) 32 gauge x 5/32" Ndle One pen needle use with insulin pen 4 times a day.  ICD-10: E11.9 150 each 11    QUEtiapine (SEROQUEL) 50 MG tablet Take 1 tablet (50 mg total) by mouth nightly as needed (insomnia). 90 tablet 3    ticagrelor (BRILINTA) 90 mg tablet Take 1 tablet (90 mg total) by mouth 2 (two) times daily. 180 tablet 3    vancomycin (VANCOCIN) 125 MG capsule Take 1 capsule by mouth every 6 hours until October 9th then take twice daily until off IV antibiotics (11/7/24) then stop 108 capsule 0    albuterol (PROVENTIL/VENTOLIN HFA) 90 mcg/actuation inhaler Inhale 2 puffs into the lungs every 6 (six) hours as needed for Wheezing or Shortness of Breath. 18 g 1    aluminum & magnesium hydroxide-simethicone (MYLANTA MAX STRENGTH) 400-400-40 mg/5 mL suspension Take 30 mLs by mouth every 6 (six) hours as needed for Indigestion.      hydrOXYzine HCL (ATARAX) 25 MG tablet Take 1 tablet (25 mg total) by mouth 3 (three) times daily as needed for Itching. 60 tablet 1    OZEMPIC 1 mg/dose (4 mg/3 mL) Inject 1 mg into the skin every 7 days. HOLD while at [facility]         Past Surgical History:   Procedure Laterality Date    APPLICATION OF WOUND VACUUM-ASSISTED CLOSURE DEVICE Right 9/25/2024    Procedure: APPLICATION, WOUND VAC;  Surgeon: Neto Davalos MD;  Location: [facility] OR [address] [place] [address];  Service: Orthopedics;  Laterality: Right;    COLONOSCOPY  11/07/2012    Colon polyp found; repeat in 5 years    DEBRIDEMENT OF LOCAL FLAP Right 9/25/2024    " Procedure: DEBRIDEMENT, LOCAL FLAP;  Surgeon: Neto Davalos MD;  Location: [facility] OR [address] [place] [address];  Service: Orthopedics;  Laterality: Right;    ELBOW SURGERY Right 2015    dislocation repair     ESOPHAGOGASTRODUODENOSCOPY  11/07/2012    atrophic gastritis, H pylori testing negative    INCISION AND DRAINAGE FOOT Right 6/2/2021    Procedure: INCISION AND DRAINAGE, FOOT, bone biopsy;  Surgeon: Quiana Penn DPM;  Location: [facility];  Service: Podiatry;  Laterality: Right;    IRRIGATION AND DEBRIDEMENT OF LOWER EXTREMITY Right 9/25/2024    Procedure: IRRIGATION AND DEBRIDEMENT, LOWER EXTREMITY; slider table, supine, bone foam, cysto tubing, 6L NS/dakins/peroxide, culture swabs;  Surgeon: Neto Davalos MD;  Location: [address];  Service: Orthopedics;  Laterality: Right;    KNEE SURGERY Bilateral 2015    scoped    LEFT HEART CATHETERIZATION Left 3/29/2019    Procedure: Left heart cath;  Surgeon: Bladimir Barbosa MD;  Location: [facility] CATH LAB;  Service: Cardiology;  Laterality: Left;    LEFT HEART CATHETERIZATION Left 11/18/2019    Procedure: Left heart cath;  Surgeon: Karl Rico MD;  Location: [facility] CATH LAB;  Service: Cardiology;  Laterality: Left;    LEFT HEART CATHETERIZATION Left 1/8/2020    Procedure: Left heart cath, right radial, noon start;  Surgeon: Christos Monreal MD;  Location: [facility] CATH LAB;  Service: Cardiology;  Laterality: Left;  RN Pre Op 1-6-20.  To be admitted 1-7-20 sor Aspirin Disensitation    OPEN REDUCTION AND INTERNAL FIXATION (ORIF) OF FRACTURE OF ACETABULUM Right 8/16/2024    Procedure: ORIF, FRACTURE, ACETABULUM;  Surgeon: Neto Davalos MD;  Location: [address];  Service: Orthopedics;  Laterality: Right;  anterior and lateral pelvic incisions    OPEN REDUCTION AND INTERNAL FIXATION (ORIF) OF PILON FRACTURE Right 8/14/2024    Procedure: ORIF, FRACTURE, PILON;  Surgeon: Neto Davalos MD;  Location: [facility] OR [address] [place] [address];  Service: Orthopedics;   Laterality: Right;    TONSILLECTOMY  1955    ULTRASOUND GUIDANCE  1/8/2020    Procedure: Ultrasound Guidance;  Surgeon: Christos Monreal MD;  Location: [facility] CATH LAB;  Service: Cardiology;;       Social History     Socioeconomic History    Marital status:     Number of children: 2   Occupational History    Occupation: house wife    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  2 dtr.  Lives with .  3 cats and a dog.  Retired.  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  Lives in house, 1 story and 4 steps up and has a ramp.      Enjoys crafting.  Unable to bowl due to myalgias.       Social Drivers of Health     Financial Resource Strain: Low Risk  (10/31/2024)    Overall Financial Resource Strain (CARDIA)     Difficulty of Paying Living Expenses: Not hard at all   Recent Concern: Financial Resource Strain - Medium Risk (10/31/2024)    Overall Financial Resource Strain (CARDIA)     Difficulty of Paying Living Expenses: Somewhat hard   Food Insecurity: No Food Insecurity (10/31/2024)    Hunger Vital Sign     Worried About Running Out of Food in the Last Year: Never true     Ran Out of Food in the Last Year: Never true   Transportation Needs: No Transportation Needs (10/31/2024)    TRANSPORTATION NEEDS     Transportation : No   Physical Activity: Inactive (10/31/2024)    Exercise Vital Sign     Days of Exercise per Week: 0 days     Minutes of Exercise per Session: 0 min   Stress: Stress Concern Present (10/31/2024)    [employer] [place] [employer] - Occupational Stress Questionnaire     Feeling of Stress : Very much   Housing Stability: Low Risk  (10/31/2024)    Housing Stability Vital Sign     Unable to Pay for Housing in the Last Year: No     Homeless in the  "Last Year: No         CBC:   Recent Labs     24  0234 24  0558   WBC 7.83 8.48   RBC 2.73* 2.92*   HGB 7.7* 8.4*   HCT 24.5* 25.6*    221   MCV 90 88   MCH 28.2 28.8   MCHC 31.4* 32.8       CMP:   Recent Labs     24  0234 24  0558    141   K 4.1 3.7    109   CO2 20* 21*   BUN 36* 25*   CREATININE 1.7* 1.6*   * 122*   MG 1.6 1.5*   PHOS 3.0 3.3   CALCIUM 9.0 9.3   ALBUMIN 2.2* 2.2*   PROT 5.9* 6.0   ALKPHOS 171* 175*   ALT 29 33   AST 45* 47*   BILITOT 0.3 0.3       INR  No results for input(s): "PT", "INR", "PROTIME", "APTT" in the last 72 hours.        Diagnostic Studies:      EKD Echo:  Results for orders placed or performed during the hospital encounter of 12/16/15   2D Echo w/ Color Flow Doppler    Collection Time: 12/16/15 11:00 AM   Result Value Ref Range    EF + QEF 55 55 - 65    Mitral Valve Regurgitation TRIVIAL     Diastolic Dysfunction Yes (A)     Est. PA Systolic Pressure 36.48     Tricuspid Valve Regurgitation MILD            Pre-op Assessment    I have reviewed the Patient Summary Reports.    I have reviewed the NPO Status.   I have reviewed the Medications.     Review of Systems  Anesthesia Hx:  No problems with previous Anesthesia               Denies Personal Hx of Anesthesia complications.                    Cardiovascular:  Exercise tolerance: poor     Past MI CAD   CABG/stent                                       Pulmonary:  Pulmonary Normal                       Renal/:  Chronic Renal Disease, CKD                Hepatic/GI:     GERD                Neurological:   CVA, no residual symptoms    Seizures                                Endocrine:  Diabetes               Physical Exam  General: Cooperative, Alert and Oriented    Airway:  Mallampati: II   Mouth Opening: Normal  TM Distance: Normal  Tongue: Normal  Neck ROM: Normal ROM    Dental:  Periodontal disease        Anesthesia Plan  Type of Anesthesia, risks & benefits " discussed:    Anesthesia Type: Gen ETT  Intra-op Monitoring Plan: Standard ASA Monitors  Post Op Pain Control Plan: multimodal analgesia  Induction:  IV and rapid sequence  Airway Plan: Video, Post-Induction  Informed Consent: Informed consent signed with the Patient and all parties understand the risks and agree with anesthesia plan.  All questions answered.   ASA Score: 3  Day of Surgery Review of History & Physical: H&P Update referred to the surgeon/provider.  Anesthesia Plan Notes: RSI given current abd pain and recent nausea    Ready For Surgery From Anesthesia Perspective.     .

## 2024-11-04 NOTE — PROVATION PATIENT INSTRUCTIONS
Discharge Summary/Instructions after an Endoscopic Procedure  Patient Name: Lorena Contreras  Patient MRN: [id number]  Patient YOB: 1950  Monday, November 4, 2024  David Castaneda MD  Dear patient,  As a result of recent federal legislation (The Federal Cures Act), you may   receive lab or pathology results from your procedure in your [facility]   account before your physician is able to contact you. Your physician or   their representative will relay the results to you with their   recommendations at their soonest availability.  Thank you,  RESTRICTIONS:  During your procedure today, you received medications for sedation.  These   medications may affect your judgment, balance and coordination.  Therefore,   for 24 hours, you have the following restrictions:   - DO NOT drive a car, operate machinery, make legal/financial decisions,   sign important papers or drink alcohol.    ACTIVITY:  Today: no heavy lifting, straining or running due to procedural   sedation/anesthesia.  The following day: return to full activity including work.  DIET:  Eat and drink normally unless instructed otherwise.     TREATMENT FOR COMMON SIDE EFFECTS:  - Mild abdominal pain, nausea, belching, bloating or excessive gas:  rest,   eat lightly and use a heating pad.  - Sore Throat: treat with throat lozenges and/or gargle with warm salt   water.  - Because air was used during the procedure, expelling large amounts of air   from your rectum or belching is normal.  - If a bowel prep was taken, you may not have a bowel movement for 1-3 days.    This is normal.  SYMPTOMS TO WATCH FOR AND REPORT TO YOUR PHYSICIAN:  1. Abdominal pain or bloating, other than gas cramps.  2. Chest pain.  3. Back pain.  4. Signs of infection such as: chills or fever occurring within 24 hours   after the procedure.  5. Rectal bleeding, which would show as bright red, maroon, or black stools.   (A tablespoon of blood from the rectum is not serious,  especially if   hemorrhoids are present.)  6. Vomiting.  7. Weakness or dizziness.  GO DIRECTLY TO THE NEAREST EMERGENCY ROOM IF YOU HAVE ANY OF THE FOLLOWING:      Difficulty breathing              Chills and/or fever over 101 F   Persistent vomiting and/or vomiting blood   Severe abdominal pain   Severe chest pain   Black, tarry stools   Bleeding- more than one tablespoon   Any other symptom or condition that you feel may need urgent attention  Your doctor recommends these additional instructions:  If any biopsies were taken, your doctors clinic will contact you in 1 to 2   weeks with any results.  - Repeat colonoscopy in 7 years for surveillance of multiple polyps.   - Return to referring physician.   - Resume previous diet.   - Resume anticoagulation/antiplatelets in 3 days at prior dose.   - [facility] patient to [facility] for [facility].  For questions, problems or results please call your physician - David Castaneda MD at Work:  [phone].  [facility], EMERGENCY ROOM PHONE NUMBER: [phone]  IF A COMPLICATION OR EMERGENCY SITUATION ARISES AND YOU ARE UNABLE TO REACH   YOUR PHYSICIAN - GO DIRECTLY TO THE EMERGENCY ROOM.  David Castaneda MD  11/4/2024 3:53:37 PM  This report has been verified and signed electronically.  Dear patient,  As a result of recent federal legislation (The Federal Cures Act), you may   receive lab or pathology results from your procedure in your [facility]   account before your physician is able to contact you. Your physician or   their representative will relay the results to you with their   recommendations at their soonest availability.  Thank you,  PROVATION

## 2024-11-04 NOTE — ANESTHESIA POSTPROCEDURE EVALUATION
Anesthesia Post Evaluation    Patient: Lorena Contreras    Procedure(s) Performed: Procedure(s) (LRB):  COLONOSCOPY (N/A)    Final Anesthesia Type: general      Patient location during evaluation: [facility]  Patient participation: Yes- Able to Participate  Level of consciousness: awake and alert  Post-procedure vital signs: reviewed and stable  Pain management: adequate  Airway patency: patent    PONV status at discharge: No PONV  Anesthetic complications: no      Cardiovascular status: hemodynamically stable  Respiratory status: unassisted  Hydration status: euvolemic  Follow-up not needed.              Vitals Value Taken Time   /71 11/04/24 1146   Temp 36.2 °C (97.1 °F) 11/04/24 1105   Pulse 83 11/04/24 1152   Resp 38 11/04/24 1137   SpO2 100 % 11/04/24 1152   Vitals shown include unfiled device data.      Event Time   Out of Recovery 11/04/2024 11:52:00         Pain/Bharathi Score: Bharathi Score: 10 (11/4/2024 11:30 AM)

## 2024-11-04 NOTE — PT/OT/SLP PROGRESS
Occupational Therapy      Patient Name:  Lorena Contreras   MRN:  [id number]    Patient not seen today secondary to off the floor for a colonoscopy. Will follow-up.    11/4/2024

## 2024-11-04 NOTE — ASSESSMENT & PLAN NOTE
Patients blood pressure range in the last 24 hours was: BP  Min: 99/54  Max: 189/73.The patient's inpatient anti-hypertensive regimen is listed below:  Current Antihypertensives  isosorbide mononitrate 24 hr tablet 60 mg, Daily, Oral  metoprolol succinate (TOPROL-XL) 24 hr tablet 25 mg, Daily, Oral    Plan  - BP is controlled, no changes needed to their regimen  - avoid nephrotoxic antihypertensives

## 2024-11-04 NOTE — PROGRESS NOTES
David Mijares - Surgery ([facility])  [facility] Medicine  Progress Note    Patient Name: Lorena Contreras  MRN: [id number]  Patient Class: IP- Inpatient   Admission Date: 10/30/2024  Length of Stay: 4 days  Attending Physician: Marita Haywood MD  Primary Care Provider: Jorge Paris MD        Subjective:     Principal Problem:C. difficile colitis        HPI:  Lorena Contreras is a 74y female with a pmhx of CKD, CAD (MI 2019), HTN, fibromyalgia, and lymphoma (s/p chemo, in remission) who presented today with large volume bright red blood with clots in her stool. In August she fell injuring her right hip and ankle which required surgical fixation. On discharge from SNF her ankle wound dehisced requiring surgical debridement. At time of that admission she was found to have c. Diff colitis. She was discharged on a 6 week course of doxycycline and PO vancomycin until completion of doxycyline course. Her diarrhea is still occurring every 2-3 hours and 3 days ago she developed nausea and vomiting. Her outpatient ID physician advised her to taper her PO vancomycin from q6h to BID. Yesterday her daughter noticed a darkening of her stools and today she was noted to have a pad filled with bright red blood and clots. While in the ED she developed abdominal pain localized to RLQ. No further bowel movements since and has remained NPO. She notes chills, anxiety, ankle pain, and new abdominal pain but denies any fevers, current dizziness, changes in vision, chest pains, vomiting, or extremity swelling. She reports feeling dizzy when arising in the morning for the past few weeks. Last dose of ASA and brilinta was this morning.     In the ED she was hemodynamically stable with a hgb of 8.8 near her baseline. BUN 67 and Cr 2.7 with baseline of 1.5. AST 51, ALT 37, ALKP 181 all at baseline. Troponin mildly elevated at 0.071. EKG with no acute changes.         Overview/Hospital Course:  Admitted to Hospital Medicine for further  evaluation and management of C.Diff colitis.  Placed in the proper contact precautions. Patient's doxycycline was continued from previous regimen.GI consulted for evaluation of lower GI bleed. Plan for colonoscopy Monday. ID consulted. Patient was initially continued on her Vancomycin but then transitioned to Fidaxomicin on 10/31. Cdiff panel negative. Fidaxomicin was discontinued and patient was started back on PO Vancomycin 125mg daily for secondary prophylaxis while on the Doxycycline. EGD and colonoscopy performed. EGD without any blood. Colonoscopy showed friable mucosa throughout the entire colon. Random biopsies taken to rule out microscopic colitis. Also had a few polyps removed.     Interval History: NAEON. Afebrile, VSS. Patient states she completed bowel prep. Noted black stool first 2 BM, then no other bloody. Stool yellow most recently. Denies any abdominal pain, fever, nausea, or vomiting.     EGD and colonoscopy performed. EGD without any blood. Colonoscopy showed friable mucosa throughout the entire colon. Random biopsies taken to rule out microscopic colitis. Also had a few polyps removed. GI recommending ID to weigh in on likelihood that C. Diff lead to such friable mucosa.     Review of Systems   Constitutional:  Negative for fatigue.   Respiratory:  Negative for cough and shortness of breath.    Cardiovascular:  Negative for chest pain, palpitations and leg swelling.   Gastrointestinal:  Negative for abdominal pain, diarrhea, nausea and vomiting.     Objective:     Vital Signs (Most Recent):  Temp: 98.1 °F (36.7 °C) (11/04/24 0746)  Pulse: 84 (11/04/24 0746)  Resp: 18 (11/04/24 0746)  BP: (!) 161/72 (11/04/24 0746)  SpO2: 99 % (11/04/24 0746) Vital Signs (24h Range):  Temp:  [97.8 °F (36.6 °C)-98.5 °F (36.9 °C)] 98.1 °F (36.7 °C)  Pulse:  [80-96] 84  Resp:  [17-18] 18  SpO2:  [99 %-100 %] 99 %  BP: (149-189)/(61-73) 161/72     Weight: 86 kg (189 lb 9.5 oz)  Body mass index is 28.83  kg/m².    Intake/Output Summary (Last 24 hours) at 11/4/2024 0825  Last data filed at 11/3/2024 1856  Gross per 24 hour   Intake 1060 ml   Output --   Net 1060 ml         Physical Exam  Vitals reviewed.   Constitutional:       General: She is not in acute distress.     Appearance: Normal appearance. She is not ill-appearing.   HENT:      Head: Normocephalic.      Right Ear: External ear normal.      Left Ear: External ear normal.      Mouth/Throat:      Mouth: Mucous membranes are moist.      Pharynx: Oropharynx is clear.   Eyes:      General: No scleral icterus.     Extraocular Movements: Extraocular movements intact.   Cardiovascular:      Rate and Rhythm: Normal rate and regular rhythm.      Heart sounds: Normal heart sounds.   Pulmonary:      Effort: Pulmonary effort is normal. No respiratory distress.      Breath sounds: Normal breath sounds.   Abdominal:      General: There is no distension.      Tenderness: There is no abdominal tenderness.   Musculoskeletal:      Right lower leg: No edema.      Left lower leg: No edema.      Comments: R ankle wrapped in brace   Skin:     General: Skin is warm.      Coloration: Skin is pale. Skin is not jaundiced.      Findings: No erythema or rash.   Neurological:      Mental Status: She is alert and oriented to person, place, and time. Mental status is at baseline.   Psychiatric:         Mood and Affect: Mood normal.         Behavior: Behavior normal.         Thought Content: Thought content normal.             Significant Labs: All pertinent labs within the past 24 hours have been reviewed.  CBC:   Recent Labs   Lab 11/03/24  0234 11/04/24  0558   WBC 7.83 8.48   HGB 7.7* 8.4*   HCT 24.5* 25.6*    221     CMP:   Recent Labs   Lab 11/03/24  0234 11/04/24  0558    141   K 4.1 3.7    109   CO2 20* 21*   * 122*   BUN 36* 25*   CREATININE 1.7* 1.6*   CALCIUM 9.0 9.3   PROT 5.9* 6.0   ALBUMIN 2.2* 2.2*   BILITOT 0.3 0.3   ALKPHOS 171* 175*   AST 45* 47*    ALT 29 33   ANIONGAP 9 11       Significant Imaging: I have reviewed all pertinent imaging results/findings within the past 24 hours.    Assessment/Plan:      * C. difficile colitis  Diagnosed with C. Diff on admission from 9/24-10/7, likely obtained from outpatient rehab facility. She has had persistent watery diarrhea since discharge every 2-3 hours. Discharged on PO vancomycin until 11/7, but taper started early on 10/27 after she developed nausea and vomiting. N/V now resolved but noted a slightly bloody stool on 10/29 and gross bright red blood with clots on 10/30 prompting presentation to ED. She has been on a course of doxycycline as well for ankle surgical infection.    Plan:  - Cdiff panel negative. ID following, appreciate recs  - Continue PO vanc 125 q24 hours for secondary prophylaxis until 11/12   - continue PO doxycycline 100 mg BID until 11/7  - Consult to GI for hematochezia evaluation, appreciate recs  - EGD with no blood present   - Colonoscopy showed friable mucosa throughout the entire colon. Random biopsies taken to rule out microscopic colitis. Also had a few polyps removed.   - daily cbc/cmp  - no probiotics due to persistent glucose drops while taking them  - no zofran due to elevated qtc, can trial scopolamine patch if nauseous  - hold ASA/brilinta    Coronary artery disease  Prior MI in 2019. Remains on ASA/Brilinta. Was placed on warfarin post-op but has since stopped. Trop mildly elevated on admission. No acute EKG changes or chest pain.     Noted to have fluid retention in extremities since her fall. Started on 40mg lasix daily. Last echo from 11/2023. Never diagnosed with HF. No SOB or pitting edema indicating excess fluid at this time.      Plan:  - Chronic.   - Trop peaked at 0.096.   - TTE w/EF 60-65%. Mild Pulmonary HTN.   - if develops chest pain - stat EKG, repeat trop  - hold ASA/brilinta in setting of ongoing GI bleed  - hold lasix for bobbi    Elevated troponin level not due  myocardial infarction  Troponin on admission 0.071. EKG only notable for prior infarct. No chest pain or SOB. Likely demand ischemia.    Plan:  Trops peaked at 0.096 then downtrended. Resolved.       Anemia  Chronic anemia likely exacerbated by acute blood loss. Most recent hemoglobin and hematocrit are listed below.  Recent Labs     11/02/24  0429 11/03/24  0234 11/04/24  0558   HGB 7.8* 7.7* 8.4*   HCT 23.7* 24.5* 25.6*       Patient has not received any transfusions this admission, however required transfusions on recent admissions    Plan  - daily cbc  - Transfuse PRBC if patient becomes hemodynamically unstable, symptomatic or H/H drops below 7/21. Will transfuse earlier if gross hematochezia continues  - Patient's anemia is currently  being monitored      Rectal bleeding  See C. Diff colitis    Wound dehiscence, right ankle  Fell on 8/14 with resultant right ankle and hip fracture. Underwent surgical fixation and was discharged to rehab. On discharge from rehab R ankle wound dehisced. Underwent wound debridement during readmission from 9/24-10/7. Ankle remains wrapped and braced with severe pain relieved by robaxin. Notes shooting pain but previously was unable to tolerate gabapentin. On 6 week doxycycline course until 11/12 complicated by persistent C. Diff colitis.    Plan:  - consult wound care  - Right ankle incision: bedside nursing to cleanse with NS, pat dry, apply a bordered island dressing to the wound, cover with cast padding and loosely secure with an elastic bandage every 3 days   - consult to ID for abx recs  - continue doxycycline course. Added a PPI due to esophagitis symptoms.   - pain control with oxy 5 q8h prn      Stage 3b chronic kidney disease  Creatine stable for now. BMP reviewed- noted Estimated Creatinine Clearance: 35.4 mL/min (A) (based on SCr of 1.6 mg/dL (H)). according to latest data. Based on current GFR, CKD stage is stage 3 - GFR 30-59 with acute KYA.      Plan:  - Monitor UOP  and serial BMP and adjust therapy as needed.   - Renally dose meds.   - Avoid nephrotoxic medications and procedures.  - hold lasix    Type 2 diabetes mellitus with stage 3b chronic kidney disease, with long-term current use of insulin    Lab Results   Component Value Date    HGBA1C 8.2 (H) 07/10/2024     Home regimen: On 12U lantus nightly with SSI at meals.      - Hold oral anti-glycemics while hospitalized  - low dose SSI  - Accu-checks q AC/HS  - Goal glucose 140-180  - Titrate insulin regimen as needed.   - Diabetic diet as tolerated  - Hypoglycemia protocol in place  - If blood glucose greater than 300, please ask patient not to eat food or drink anything other than water until correctional insulin has brought it back below 250  - If patient is NPO, please hold pre-meal Aspart (Daily Detemir/basal insulin is ok to give even if patient is NPO)       KYA (acute kidney injury)  KYA is likely due to pre-renal azotemia due to intravascular volume depletion. Baseline creatinine is  1.5 . Most recent creatinine and eGFR are listed below.  Recent Labs     11/02/24  0429 11/03/24  0234 11/04/24  0558   CREATININE 2.2* 1.7* 1.6*   EGFRNORACEVR 23.0* 31.3* 33.6*       Likely pre-renal in the setting of acute blood loss     Plan  - KYA is  being monitored . Improving.   - Avoid nephrotoxins and renally dose meds for GFR listed above  - Monitor urine output, daily cmp, and adjust therapy as needed  - hold home lasix    Mixed hyperlipidemia  Statin previously held for chronic elevation in LFTs. Will not resume at this time.    Primary hypertension  Patients blood pressure range in the last 24 hours was: BP  Min: 99/54  Max: 189/73.The patient's inpatient anti-hypertensive regimen is listed below:  Current Antihypertensives  isosorbide mononitrate 24 hr tablet 60 mg, Daily, Oral  metoprolol succinate (TOPROL-XL) 24 hr tablet 25 mg, Daily, Oral    Plan  - BP is controlled, no changes needed to their regimen  - avoid  nephrotoxic antihypertensives    Anxiety  Chronic poorly controlled anxiety requiring PRN ativan near daily. Exacerbated by ongoing health concerns.    Plan:  - continue home fluoxetine  - PRN ativan  - hold home seroquel for elevated qtc, trial of melatonin and daily ekg      VTE Risk Mitigation (From admission, onward)           Ordered     Reason for No Pharmacological VTE Prophylaxis  Once        Question:  Reasons:  Answer:  Active Bleeding    10/30/24 2302     IP VTE HIGH RISK PATIENT  Once         10/30/24 2302     Place sequential compression device  Until discontinued         10/30/24 2302                    Discharge Planning   MIKHAIL: 11/5/2024     Code Status: Partial Code   Is the patient medically ready for discharge?:     Reason for patient still in hospital (select all that apply): Treatment   Discharge Plan A: Home Health          Javier Harris MD  Department of Hospital Medicine   [facility] - Surgery ([facility])

## 2024-11-04 NOTE — ASSESSMENT & PLAN NOTE
Diagnosed with C. Diff on admission from 9/24-10/7, likely obtained from outpatient rehab facility. She has had persistent watery diarrhea since discharge every 2-3 hours. Discharged on PO vancomycin until 11/7, but taper started early on 10/27 after she developed nausea and vomiting. N/V now resolved but noted a slightly bloody stool on 10/29 and gross bright red blood with clots on 10/30 prompting presentation to ED. She has been on a course of doxycycline as well for ankle surgical infection.    Plan:  - Cdiff panel negative. ID following, appreciate recs  - Continue PO vanc 125 q24 hours for secondary prophylaxis until 11/12   - continue PO doxycycline 100 mg BID until 11/7  - Consult to GI for hematochezia evaluation, appreciate recs  - EGD with no blood present   - Colonoscopy showed friable mucosa throughout the entire colon. Random biopsies taken to rule out microscopic colitis. Also had a few polyps removed.   - daily cbc/cmp  - no probiotics due to persistent glucose drops while taking them  - no zofran due to elevated qtc, can trial scopolamine patch if nauseous  - hold ASA/brilinta

## 2024-11-04 NOTE — PT/OT/SLP PROGRESS
Physical Therapy    Update    Lorena Contreras   MRN: [id number]    PT orders received and acknowledged. Attempted to see this morning for PT evaluation but Ms. Carrillo was LISSA for her scheduled colonoscopy. Will check back tomorrow for assessment, no billable units today.    Regis Alvarez, PT  11/4/2024

## 2024-11-04 NOTE — TREATMENT PLAN
GI Treatment Plan    S/p colonoscopy    Impression:            - One 2 mm polyp in the ascending colon, removed                          with a cold snare. Resected and retrieved.                          - Two 4 to 5 mm polyps in the transverse colon,                          removed with a cold snare. Resected and retrieved.                          - Friability with contact bleeding in the entire                          examined colon. Biopsied.                          - One non-bleeding colonic angioectasia.                          - Internal hemorrhoids.                          - The examined portion of the ileum was normal.   Recommendation:        - Repeat colonoscopy in 7 years for surveillance                          of multiple polyps.                          - Return to referring physician.                          - Resume previous diet.                          - Resume anticoagulation/antiplatelets in 3 days                          at prior dose.                          - [facility] patient to [facility] for [facility].     GI will sign off.     Meg Mccormack MD  Gastroenterology Fellow, PGY IV

## 2024-11-04 NOTE — TRANSFER OF CARE
"Anesthesia Transfer of Care Note    Patient: Lorena Contreras    Procedure(s) Performed: Procedure(s) (LRB):  COLONOSCOPY (N/A)    Patient location: PACU    Anesthesia Type: general    Transport from OR: Transported from OR on room air with adequate spontaneous ventilation    Post pain: adequate analgesia    Post assessment: no apparent anesthetic complications and tolerated procedure well    Post vital signs: stable    Level of consciousness: awake and alert    Nausea/Vomiting: no nausea/vomiting    Complications: none    Transfer of care protocol was followed      Last vitals: Visit Vitals  BP (!) 184/76 (BP Location: Left arm, Patient Position: Lying)   Pulse 92   Temp 36.4 °C (97.6 °F) (Temporal)   Resp 17   Ht 5' 8" (1.727 m)   Wt 85.7 kg (189 lb)   SpO2 100%   Breastfeeding No   BMI 28.74 kg/m²     "

## 2024-11-04 NOTE — NURSING TRANSFER
Nursing Transfer Note      Reason patient is being transferred: PACU 19    Transfer To: [facility] 1108    Transfer via stretcher    Transfer with cardiac monitoring    Transported by Patient Transport    Medicines sent: None    Any special needs or follow-up needed: Routine Care    Chart send with patient: Yes    Notified: daughters    Patient reassessed at: 11/4/2024 1130 (date, time)

## 2024-11-04 NOTE — ASSESSMENT & PLAN NOTE
Creatine stable for now. BMP reviewed- noted Estimated Creatinine Clearance: 35.4 mL/min (A) (based on SCr of 1.6 mg/dL (H)). according to latest data. Based on current GFR, CKD stage is stage 3 - GFR 30-59 with acute KYA.      Plan:  - Monitor UOP and serial BMP and adjust therapy as needed.   - Renally dose meds.   - Avoid nephrotoxic medications and procedures.  - hold lasix

## 2024-11-04 NOTE — SUBJECTIVE & OBJECTIVE
Interval History: NAEON. Afebrile, VSS. Patient states she completed bowel prep. Noted black stool first 2 BM, then no other bloody. Stool yellow most recently. Denies any abdominal pain, fever, nausea, or vomiting.     EGD and colonoscopy performed. EGD without any blood. Colonoscopy showed friable mucosa throughout the entire colon. Random biopsies taken to rule out microscopic colitis. Also had a few polyps removed. GI recommending ID to weigh in on likelihood that C. Diff lead to such friable mucosa.     Review of Systems   Constitutional:  Negative for fatigue.   Respiratory:  Negative for cough and shortness of breath.    Cardiovascular:  Negative for chest pain, palpitations and leg swelling.   Gastrointestinal:  Negative for abdominal pain, diarrhea, nausea and vomiting.     Objective:     Vital Signs (Most Recent):  Temp: 98.1 °F (36.7 °C) (11/04/24 0746)  Pulse: 84 (11/04/24 0746)  Resp: 18 (11/04/24 0746)  BP: (!) 161/72 (11/04/24 0746)  SpO2: 99 % (11/04/24 0746) Vital Signs (24h Range):  Temp:  [97.8 °F (36.6 °C)-98.5 °F (36.9 °C)] 98.1 °F (36.7 °C)  Pulse:  [80-96] 84  Resp:  [17-18] 18  SpO2:  [99 %-100 %] 99 %  BP: (149-189)/(61-73) 161/72     Weight: 86 kg (189 lb 9.5 oz)  Body mass index is 28.83 kg/m².    Intake/Output Summary (Last 24 hours) at 11/4/2024 0825  Last data filed at 11/3/2024 1856  Gross per 24 hour   Intake 1060 ml   Output --   Net 1060 ml         Physical Exam  Vitals reviewed.   Constitutional:       General: She is not in acute distress.     Appearance: Normal appearance. She is not ill-appearing.   HENT:      Head: Normocephalic.      Right Ear: External ear normal.      Left Ear: External ear normal.      Mouth/Throat:      Mouth: Mucous membranes are moist.      Pharynx: Oropharynx is clear.   Eyes:      General: No scleral icterus.     Extraocular Movements: Extraocular movements intact.   Cardiovascular:      Rate and Rhythm: Normal rate and regular rhythm.      Heart  sounds: Normal heart sounds.   Pulmonary:      Effort: Pulmonary effort is normal. No respiratory distress.      Breath sounds: Normal breath sounds.   Abdominal:      General: There is no distension.      Tenderness: There is no abdominal tenderness.   Musculoskeletal:      Right lower leg: No edema.      Left lower leg: No edema.      Comments: R ankle wrapped in brace   Skin:     General: Skin is warm.      Coloration: Skin is pale. Skin is not jaundiced.      Findings: No erythema or rash.   Neurological:      Mental Status: She is alert and oriented to person, place, and time. Mental status is at baseline.   Psychiatric:         Mood and Affect: Mood normal.         Behavior: Behavior normal.         Thought Content: Thought content normal.             Significant Labs: All pertinent labs within the past 24 hours have been reviewed.  CBC:   Recent Labs   Lab 11/03/24 0234 11/04/24  0558   WBC 7.83 8.48   HGB 7.7* 8.4*   HCT 24.5* 25.6*    221     CMP:   Recent Labs   Lab 11/03/24 0234 11/04/24  0558    141   K 4.1 3.7    109   CO2 20* 21*   * 122*   BUN 36* 25*   CREATININE 1.7* 1.6*   CALCIUM 9.0 9.3   PROT 5.9* 6.0   ALBUMIN 2.2* 2.2*   BILITOT 0.3 0.3   ALKPHOS 171* 175*   AST 45* 47*   ALT 29 33   ANIONGAP 9 11       Significant Imaging: I have reviewed all pertinent imaging results/findings within the past 24 hours.

## 2024-11-04 NOTE — H&P
Short Stay Endoscopy History and Physical    PCP - Jorge Paris MD    Procedure - Colonoscopy  ASA - per anesthesia  Mallampati - per anesthesia  Plan of anesthesia - MAC    HPI:  This is a 74 y.o. female here for evaluation of : rectal bleeding    ROS:  Constitutional: No fevers, chills  CV: No chest pain  Pulm: No cough  Ophtho: No vision changes  GI: see HPI  Derm: No rash    Medical History:  has a past medical history of Allergy, Altered mental status (06/19/2022), Anemia, Anxiety, Arthritis, Cataract, Colon polyps, Coronary artery disease, Depression, Diabetes mellitus, type II, Disorder of kidney and ureter, Epilepsia partialis continua (4/28/2023), Fibromyalgia, Follicular lymphoma, GERD (gastroesophageal reflux disease), HTN (hypertension), Hyperlipidemia, MI (myocardial infarction) (03/2019), Personal history of colonic polyps, Restless leg syndrome, and Stroke.    Surgical History:  has a past surgical history that includes Knee surgery (Bilateral, 2015); Elbow surgery (Right, 2015); Colonoscopy (11/07/2012); Esophagogastroduodenoscopy (11/07/2012); Left heart catheterization (Left, 3/29/2019); Left heart catheterization (Left, 11/18/2019); Left heart catheterization (Left, 1/8/2020); Ultrasound guidance (1/8/2020); Incision and drainage foot (Right, 6/2/2021); Tonsillectomy (1955); Open reduction and internal fixation (ORIF) of pilon fracture (Right, 8/14/2024); Open reduction and internal fixation (ORIF) of fracture of acetabulum (Right, 8/16/2024); Irrigation and debridement of lower extremity (Right, 9/25/2024); Application of wound vacuum-assisted closure device (Right, 9/25/2024); and Debridement of local flap (Right, 9/25/2024).    Family History: family history includes Allergy (severe) in her daughter; Cancer in her father and mother; Diabetes in her sister; Heart disease in her mother; Hypertension in her sister; Lung cancer in her brother; No Known Problems in her daughter.. Otherwise  no colon cancer, inflammatory bowel disease, or GI malignancies.    Social History:  reports that she has never smoked. She has never used smokeless tobacco. She reports that she does not currently use alcohol. She reports that she does not use drugs.    Review of patient's allergies indicates:   Allergen Reactions    Novolin 70/30 (semi-synthetic) Nausea And Vomiting     Severe vomiting on Relion 70/30    Sulfa (sulfonamide antibiotics) Anaphylaxis    Talwin [pentazocine lactate] Anaphylaxis    Victoza [liraglutide] Nausea And Vomiting    Glipizide Nausea Only    Codeine     Influenza virus vaccines Hives    Iodine and iodide containing products Hives    Levetiracetam Itching    Neurontin [gabapentin]      Possible associated myoclonic jerk    Rituxan [rituximab] Hives    Zoloft [sertraline] Nausea And Vomiting       Medications:   Medications Prior to Admission   Medication Sig Dispense Refill Last Dose/Taking    aspirin 81 MG Chew Take 1 tablet (81 mg total) by mouth once daily. Hold to avoid bleed risk on triple therapy and then resume on oct 27 once eliquis stops on oct 26th   10/30/2024    BRILINTA 90 mg tablet Take 1 tablet by mouth twice daily 180 tablet 0 10/30/2024    DEXCOM G7  Misc Use 1  to track blood glucose, ICD10: E11.65 1 each 0 10/30/2024    DEXCOM G7 SENSOR Samina Use 1 sensor every 10 days to track blood glucose, ICD10: E11.65, okay with 90 day supply if possible 3 each 11 10/30/2024    doxycycline (VIBRA-TABS) 100 MG tablet Take 1 tablet (100 mg total) by mouth every 12 (twelve) hours. End date 11/7/24 74 tablet 0 10/30/2024    FLUoxetine 40 MG capsule Take 1 capsule (40 mg total) by mouth once daily. 90 capsule 3 10/30/2024    furosemide (LASIX) 40 MG tablet Take 1 tablet (40 mg total) by mouth 2 (two) times daily. 60 tablet 2 10/30/2024    hydrALAZINE (APRESOLINE) 100 MG tablet Take 1 tablet (100 mg total) by mouth every 8 (eight) hours. 135 tablet 3 10/30/2024    insulin aspart  "U-100 (NOVOLOG) 100 unit/mL (3 mL) InPn pen Inject 0-10 Units into the skin before meals and at bedtime as needed (Hyperglycemia).   10/30/2024    insulin glargine U-100, Lantus, 100 unit/mL (3 mL) SubQ InPn pen Inject 12 Units into the skin every evening.   10/30/2024    isosorbide mononitrate (IMDUR) 60 MG 24 hr tablet Take 1 tablet (60 mg total) by mouth once daily.   10/30/2024    LORazepam (ATIVAN) 1 MG tablet TAKE 1 TABLET BY MOUTH ONCE DAILY AS NEEDED FOR ANXIETY 30 tablet 0 10/30/2024    metoprolol succinate (TOPROL-XL) 25 MG 24 hr tablet Take 1 tablet (25 mg total) by mouth once daily. 90 tablet 3 10/30/2024    ondansetron (ZOFRAN-ODT) 8 MG TbDL Dissolve 1 tablet (8 mg total) by mouth every 8 (eight) hours as needed (nausea). 20 tablet 2 10/30/2024    oxyCODONE (ROXICODONE) 5 MG immediate release tablet Take 1 tablet (5 mg total) by mouth every 8 (eight) hours as needed (pain scale 4-6). 21 tablet 0 10/30/2024    pen needle, diabetic (BD ULTRA-FINE SAGAR PEN NEEDLE) 32 gauge x 5/32" Ndle One pen needle use with insulin pen 4 times a day.  ICD-10: E11.9 150 each 11 10/30/2024    QUEtiapine (SEROQUEL) 50 MG tablet Take 1 tablet (50 mg total) by mouth nightly as needed (insomnia). 90 tablet 3 10/30/2024    ticagrelor (BRILINTA) 90 mg tablet Take 1 tablet (90 mg total) by mouth 2 (two) times daily. 180 tablet 3 10/30/2024    vancomycin (VANCOCIN) 125 MG capsule Take 1 capsule by mouth every 6 hours until October 9th then take twice daily until off IV antibiotics (11/7/24) then stop 108 capsule 0 10/30/2024    albuterol (PROVENTIL/VENTOLIN HFA) 90 mcg/actuation inhaler Inhale 2 puffs into the lungs every 6 (six) hours as needed for Wheezing or Shortness of Breath. 18 g 1     aluminum & magnesium hydroxide-simethicone (MYLANTA MAX STRENGTH) 400-400-40 mg/5 mL suspension Take 30 mLs by mouth every 6 (six) hours as needed for Indigestion.       hydrOXYzine HCL (ATARAX) 25 MG tablet Take 1 tablet (25 mg total) by " mouth 3 (three) times daily as needed for Itching. 60 tablet 1     OZEMPIC 1 mg/dose (4 mg/3 mL) Inject 1 mg into the skin every 7 days. HOLD while at [facility]   10/19/2024         Vital Signs:   Vitals:    11/04/24 0918   BP: (!) 184/76   Pulse: 92   Resp: 17   Temp: 97.6 °F (36.4 °C)       Labs:  Lab Results   Component Value Date    WBC 8.48 11/04/2024    HGB 8.4 (L) 11/04/2024    HCT 25.6 (L) 11/04/2024     11/04/2024    CHOL 132 07/10/2024    TRIG 147 07/10/2024    HDL 42 07/10/2024    ALT 33 11/04/2024    AST 47 (H) 11/04/2024     11/04/2024    K 3.7 11/04/2024     11/04/2024    CREATININE 1.6 (H) 11/04/2024    BUN 25 (H) 11/04/2024    CO2 21 (L) 11/04/2024    TSH 1.181 08/29/2024    INR 1.0 10/30/2024    HGBA1C 8.2 (H) 07/10/2024       I have explained the risks and benefits of endoscopy procedures to the patient/their POA including but not limited to bleeding, perforation, infection, and death.  The patient/their POA was asked if they understand and allowed to ask any further questions to their satisfaction.    Taurus Sandoval MD

## 2024-11-04 NOTE — PLAN OF CARE
[facility] - Internal Medicine Telemetry  Discharge Reassessment    Primary Care Provider: Jorge Paris MD    Expected Discharge Date: 11/5/2024    Reassessment (most recent)       Discharge Reassessment - 11/04/24 1424          Discharge Reassessment    Assessment Type Discharge Planning Reassessment (P)      Did the patient's condition or plan change since previous assessment? No (P)      Discharge Plan discussed with: Patient (P)      Communicated MIKHAIL with patient/caregiver Date not available/Unable to determine (P)      Discharge Plan A Home Health (P)      Discharge Plan B Home Health (P)      DME Needed Upon Discharge  none (P)      Transition of Care Barriers None (P)      Why the patient remains in the hospital Requires continued medical care (P)         Post-Acute Status    Post-Acute Authorization Home Health (P)      Home Health Status Referrals Sent (P)      Coverage [employer] (P)      Discharge Delays None known at this time (P)                  CM spoke with pt in room.  DC plan home with [facility], will need ambulance transport.    ANAID Cardenas, BS, RN, CCM      Discharge Plan A and Plan B have been determined by review of patient's clinical status, future medical and therapeutic needs, and coverage/benefits for post-acute care in coordination with multidisciplinary team members.

## 2024-11-04 NOTE — ASSESSMENT & PLAN NOTE
Chronic anemia likely exacerbated by acute blood loss. Most recent hemoglobin and hematocrit are listed below.  Recent Labs     11/02/24  0429 11/03/24  0234 11/04/24  0558   HGB 7.8* 7.7* 8.4*   HCT 23.7* 24.5* 25.6*       Patient has not received any transfusions this admission, however required transfusions on recent admissions    Plan  - daily cbc  - Transfuse PRBC if patient becomes hemodynamically unstable, symptomatic or H/H drops below 7/21. Will transfuse earlier if gross hematochezia continues  - Patient's anemia is currently  being monitored

## 2024-11-05 ENCOUNTER — TELEPHONE (OUTPATIENT)
Dept: FAMILY MEDICINE | Facility: CLINIC | Age: 74
End: 2024-11-05
Payer: MEDICARE

## 2024-11-05 VITALS
TEMPERATURE: 98 F | OXYGEN SATURATION: 99 % | DIASTOLIC BLOOD PRESSURE: 78 MMHG | SYSTOLIC BLOOD PRESSURE: 134 MMHG | HEIGHT: 68 IN | BODY MASS INDEX: 28.64 KG/M2 | RESPIRATION RATE: 18 BRPM | HEART RATE: 87 BPM | WEIGHT: 189 LBS

## 2024-11-05 DIAGNOSIS — S32.511D CLOSED FRACTURE OF SUPERIOR RAMUS OF RIGHT PUBIS WITH ROUTINE HEALING, SUBSEQUENT ENCOUNTER: ICD-10-CM

## 2024-11-05 DIAGNOSIS — S82.874D CLOSED NONDISPLACED PILON FRACTURE OF RIGHT TIBIA WITH ROUTINE HEALING, SUBSEQUENT ENCOUNTER: ICD-10-CM

## 2024-11-05 DIAGNOSIS — S32.401D CLOSED DISPLACED FRACTURE OF RIGHT ACETABULUM WITH ROUTINE HEALING, UNSPECIFIED PORTION OF ACETABULUM, SUBSEQUENT ENCOUNTER: ICD-10-CM

## 2024-11-05 LAB
ALBUMIN SERPL BCP-MCNC: 2.1 G/DL (ref 3.5–5.2)
ALP SERPL-CCNC: 164 U/L (ref 40–150)
ALT SERPL W/O P-5'-P-CCNC: 27 U/L (ref 10–44)
ANION GAP SERPL CALC-SCNC: 10 MMOL/L (ref 8–16)
AST SERPL-CCNC: 37 U/L (ref 10–40)
BASOPHILS # BLD AUTO: 0.07 K/UL (ref 0–0.2)
BASOPHILS NFR BLD: 0.8 % (ref 0–1.9)
BILIRUB SERPL-MCNC: 0.2 MG/DL (ref 0.1–1)
BUN SERPL-MCNC: 28 MG/DL (ref 8–23)
CALCIUM SERPL-MCNC: 9 MG/DL (ref 8.7–10.5)
CHLORIDE SERPL-SCNC: 107 MMOL/L (ref 95–110)
CO2 SERPL-SCNC: 18 MMOL/L (ref 23–29)
CREAT SERPL-MCNC: 1.9 MG/DL (ref 0.5–1.4)
DIFFERENTIAL METHOD BLD: ABNORMAL
EOSINOPHIL # BLD AUTO: 0.2 K/UL (ref 0–0.5)
EOSINOPHIL NFR BLD: 2.7 % (ref 0–8)
ERYTHROCYTE [DISTWIDTH] IN BLOOD BY AUTOMATED COUNT: 14.3 % (ref 11.5–14.5)
EST. GFR  (NO RACE VARIABLE): 27.4 ML/MIN/1.73 M^2
GLUCOSE SERPL-MCNC: 175 MG/DL (ref 70–110)
HCT VFR BLD AUTO: 25 % (ref 37–48.5)
HGB BLD-MCNC: 7.8 G/DL (ref 12–16)
IMM GRANULOCYTES # BLD AUTO: 0.05 K/UL (ref 0–0.04)
IMM GRANULOCYTES NFR BLD AUTO: 0.6 % (ref 0–0.5)
LYMPHOCYTES # BLD AUTO: 1.7 K/UL (ref 1–4.8)
LYMPHOCYTES NFR BLD: 21.1 % (ref 18–48)
MAGNESIUM SERPL-MCNC: 1.9 MG/DL (ref 1.6–2.6)
MCH RBC QN AUTO: 28.1 PG (ref 27–31)
MCHC RBC AUTO-ENTMCNC: 31.2 G/DL (ref 32–36)
MCV RBC AUTO: 90 FL (ref 82–98)
MONOCYTES # BLD AUTO: 0.7 K/UL (ref 0.3–1)
MONOCYTES NFR BLD: 9 % (ref 4–15)
NEUTROPHILS # BLD AUTO: 5.4 K/UL (ref 1.8–7.7)
NEUTROPHILS NFR BLD: 65.8 % (ref 38–73)
NRBC BLD-RTO: 0 /100 WBC
OHS QRS DURATION: 76 MS
OHS QRS DURATION: 82 MS
OHS QTC CALCULATION: 467 MS
OHS QTC CALCULATION: 473 MS
PHOSPHATE SERPL-MCNC: 4 MG/DL (ref 2.7–4.5)
PLATELET # BLD AUTO: 214 K/UL (ref 150–450)
PMV BLD AUTO: 12.7 FL (ref 9.2–12.9)
POTASSIUM SERPL-SCNC: 3.4 MMOL/L (ref 3.5–5.1)
PROT SERPL-MCNC: 5.5 G/DL (ref 6–8.4)
RBC # BLD AUTO: 2.78 M/UL (ref 4–5.4)
SODIUM SERPL-SCNC: 135 MMOL/L (ref 136–145)
WBC # BLD AUTO: 8.26 K/UL (ref 3.9–12.7)

## 2024-11-05 PROCEDURE — 80053 COMPREHEN METABOLIC PANEL: CPT | Mod: HCNC

## 2024-11-05 PROCEDURE — 93010 ELECTROCARDIOGRAM REPORT: CPT | Mod: HCNC,,, | Performed by: INTERNAL MEDICINE

## 2024-11-05 PROCEDURE — 85025 COMPLETE CBC W/AUTO DIFF WBC: CPT | Mod: HCNC

## 2024-11-05 PROCEDURE — 97161 PT EVAL LOW COMPLEX 20 MIN: CPT | Mod: HCNC

## 2024-11-05 PROCEDURE — 93005 ELECTROCARDIOGRAM TRACING: CPT | Mod: HCNC

## 2024-11-05 PROCEDURE — 25000003 PHARM REV CODE 250: Mod: HCNC

## 2024-11-05 PROCEDURE — 97530 THERAPEUTIC ACTIVITIES: CPT | Mod: HCNC

## 2024-11-05 PROCEDURE — 25000003 PHARM REV CODE 250: Mod: HCNC | Performed by: STUDENT IN AN ORGANIZED HEALTH CARE EDUCATION/TRAINING PROGRAM

## 2024-11-05 PROCEDURE — 97165 OT EVAL LOW COMPLEX 30 MIN: CPT | Mod: HCNC

## 2024-11-05 PROCEDURE — 83735 ASSAY OF MAGNESIUM: CPT | Mod: HCNC

## 2024-11-05 PROCEDURE — 84100 ASSAY OF PHOSPHORUS: CPT | Mod: HCNC

## 2024-11-05 PROCEDURE — 97535 SELF CARE MNGMENT TRAINING: CPT | Mod: HCNC

## 2024-11-05 PROCEDURE — 36415 COLL VENOUS BLD VENIPUNCTURE: CPT | Mod: HCNC

## 2024-11-05 RX ORDER — VANCOMYCIN HYDROCHLORIDE 125 MG/1
CAPSULE ORAL
Start: 2024-11-05

## 2024-11-05 RX ADMIN — METOPROLOL SUCCINATE 25 MG: 25 TABLET, EXTENDED RELEASE ORAL at 08:11

## 2024-11-05 RX ADMIN — FLUOXETINE HYDROCHLORIDE 40 MG: 20 CAPSULE ORAL at 08:11

## 2024-11-05 RX ADMIN — VANCOMYCIN HYDROCHLORIDE 125 MG: KIT at 08:11

## 2024-11-05 RX ADMIN — ISOSORBIDE MONONITRATE 60 MG: 60 TABLET, EXTENDED RELEASE ORAL at 08:11

## 2024-11-05 RX ADMIN — PANTOPRAZOLE SODIUM 40 MG: 40 TABLET, DELAYED RELEASE ORAL at 08:11

## 2024-11-05 RX ADMIN — DOXYCYCLINE HYCLATE 100 MG: 100 TABLET, COATED ORAL at 08:11

## 2024-11-05 NOTE — PLAN OF CARE
[facility] scheduled GI apt   Future Appointments   Date Time Provider Department Center   11/6/2024 11:00 AM LAB, [facility] [place]   11/6/2024  2:30 PM Raheem Bucio NP [facility]   11/12/2024  8:30 AM Karl Rico MD [facility] CARDIO [facility]   11/13/2024  3:00 PM Gerardo Barbour MD [facility] ENDOCRN [facility]   11/18/2024  1:40 PM Christy Martinez PA-C [facility] GASTRO [facility]

## 2024-11-05 NOTE — ASSESSMENT & PLAN NOTE
Prior MI in 2019. Remains on ASA/Brilinta. Was placed on warfarin post-op but has since stopped. Trop mildly elevated on admission. No acute EKG changes or chest pain.     Noted to have fluid retention in extremities since her fall. Started on 40mg lasix daily. Last echo from 11/2023. Never diagnosed with HF. No SOB or pitting edema indicating excess fluid at this time.      Plan:  - Chronic.   - Trop peaked at 0.096.   - TTE w/EF 60-65%. Mild Pulmonary HTN.   - if develops chest pain - stat EKG, repeat trop  - hold ASA/brilinta in setting of ongoing GI bleed, will resume ASA 11/8, hold Brilinta until cardiology follow up   - hold lasix for bobbi

## 2024-11-05 NOTE — PLAN OF CARE
Follow up with Jorge Paris MD  [facility] called to schedule pcp f/u, Tamir sent message to clinic to call patient to schedule pcp f/u. [address] [facility]  [place] [address] [phone]

## 2024-11-05 NOTE — PT/OT/SLP EVAL
Occupational Therapy   Co-Evaluation  Co-treatment performed due to patient's multiple deficits requiring two skilled therapists to appropriately and safely assess patient's strength and endurance while facilitating functional tasks in addition to accommodating for patient's activity tolerance.      Name: Lorena Contreras  MRN: [id number]  Admitting Diagnosis: C. difficile colitis  Recent Surgery: Procedure(s) (LRB):  COLONOSCOPY (N/A) 1 Day Post-Op    Recommendations:     Discharge Recommendations: Low Intensity Therapy  Discharge Equipment Recommendations:  none  Barriers to discharge:  None    Assessment:     Lorena Contreras is a 74 y.o. female with a medical diagnosis of C. difficile colitis.  She presents with the following performance deficits affecting function: weakness, impaired endurance, impaired self care skills, impaired sensation, impaired functional mobility, gait instability, impaired balance, pain, decreased lower extremity function. Pt was agreeable to therapy and tolerated session well. Pt as able to engage in bed mobility, transfers, and ADLs today with limited support. Pt noted pain in R hip and ankle throughout session. Increased time required for pain management. Patient currently demonstrates a need for low intensity therapy on a scheduled basis secondary to a decline in functional status due to injury      Rehab Prognosis: Good; patient would benefit from acute skilled OT services to address these deficits and reach maximum level of function.       Plan:     Patient to be seen 3 x/week to address the above listed problems via self-care/home management, therapeutic activities, therapeutic exercises  Plan of Care Expires: 11/12/24  Plan of Care Reviewed with: patient    Subjective     Chief Complaint: Pain  Patient/Family Comments/goals: to return to [facility]    Occupational Profile:  Living Environment: Pt lives with her grandson (Adult) in a [facility] with 4 NAMAN and B HR with a ramp. She has a  tub/shower combo with a bath bench.  Previous level of function: Pt was independent 2 months prior to eval before she fell. After she fell, pt has been using a wheelchair and slide board for functional mobility and needs some assistance for ADLs.   Falls: Pt fell and had ORIF of R hip and ankle 8/16/2024 and has been NWB since. Pt reported follow-up visit with orthopedic dc in the next few days to reassess.  Roles and Routines: Pt is no longer driving and is retired. She enjoys watching TV and crafting.  Equipment Used at Home: bath bench, drop arm commode, hospital bed, walker, rolling, slide board, wheelchair, cane, quad, cane, straight  Assistance upon Discharge: Pt will have her grandson, sister, and 2 daughters there with her through the day and night.    Pain/Comfort:  Pain Rating 1: 4/10  Location - Side 1: Right  Location - Orientation 1: generalized  Location 1: leg  Pain Addressed 1: Reposition, Distraction    Patients cultural, spiritual, Methodist conflicts given the current situation: no    Objective:     Communicated with: RN prior to session.  Patient found HOB elevated with telemetry upon OT entry to room.    General Precautions: Standard, fall  Orthopedic Precautions: RLE non weight bearing   Braces:  (PRAFO)  Respiratory Status: Room air    Occupational Performance:    Bed Mobility:    Patient completed Scooting/Bridging with stand by assistance to L/R while EOB  Patient completed Supine to Sit with stand by assistance    Functional Mobility/Transfers:  Pt declined all transfers at this time.  Functional Mobility: Pt was able to sit EOB for approx 20 mins with SBA. She was able to take 3 scoots towards her R and 3 scoots towards her L with SBA while EOB.    Activities of Daily Living:  Grooming: stand by assistance to brush teeth and wash face sitting EOB  Upper Body Dressing: minimum assistance to don gown as robe while sitting EOB  Lower Body Dressing: stand by assistance to doff/don L  sock    Cognitive/Visual Perceptual:  Cognitive/Psychosocial Skills:     -       Follows Commands/attention:Follows multistep  commands  -       Communication: clear/fluent  -       Memory: No Deficits noted  -       Safety awareness/insight to disability: intact   -       Mood/Affect/Coping skills/emotional control: Appropriate to situation    Physical Exam:  Sensation:    -       Impaired  pt noted tingling in L fingertips 1-5 but was able to correctly identify light touch  Upper Extremity Range of Motion:     -       Right Upper Extremity: WFL  -       Left Upper Extremity: WFL  Upper Extremity Strength:    -       Right Upper Extremity: 4/5 grossly  -       Left Upper Extremity: 3/5 grossly   Strength:    -       Right Upper Extremity: 4/5  -       Left Upper Extremity: 4/5    AMPAC 6 Click ADL:  AMPAC Total Score: 16    Treatment & Education:  -Education on energy conservation and task modification to maximize safety and (I) during ADLs and mobility  -Education on importance of OOB activity to improve overall activity tolerance and promote recovery  -Pt educated to call for assistance and to transfer with hospital staff only  -Provided education regarding role of OT, POC, & discharge recommendations with pt verbalizing understanding.  Pt had no further questions & when asked whether there were any concerns pt reported none.     Patient left HOB elevated with all lines intact, call button in reach, and RN notified    GOALS:   Multidisciplinary Problems       Occupational Therapy Goals          Problem: Occupational Therapy    Goal Priority Disciplines Outcome Interventions   Occupational Therapy Goal     OT, PT/OT Progressing    Description: Goals to be met by: 11/12/24     Patient will increase functional independence with ADLs by performing:    UE Dressing with Set-up Assistance.  LE Dressing with Set-up Assistance.  Grooming while seated at sink with Set-up Assistance.  Toileting from toilet with Stand-by  Assistance for hygiene and clothing management.   Slide board transfer to wheelchair with Minimal Assistance  Toilet transfer to toilet with Minimal Assistance.  Increased functional strength to WFL for B UE as indicated.                         History:     Past Medical History:   Diagnosis Date    Allergy     Altered mental status 06/19/2022    DYSARTHRIA, SPASTIC MOVEMENTS & DIFFICULTY SWALLOWING    Anemia     Anxiety     Arthritis     Cataract     both removed    Colon polyps     Coronary artery disease     Depression     Diabetes mellitus, type II     Disorder of kidney and ureter     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/2023    Fibromyalgia     Follicular lymphoma     GERD (gastroesophageal reflux disease)     HTN (hypertension)     Hyperlipidemia     MI (myocardial infarction) 03/2019    Personal history of colonic polyps     Restless leg syndrome     Stroke          Past Surgical History:   Procedure Laterality Date    APPLICATION OF WOUND VACUUM-ASSISTED CLOSURE DEVICE Right 9/25/2024    Procedure: APPLICATION, WOUND VAC;  Surgeon: Neto Davalos MD;  Location: [address];  Service: Orthopedics;  Laterality: Right;    COLONOSCOPY  11/07/2012    Colon polyp found; repeat in 5 years    COLONOSCOPY N/A 11/4/2024    Procedure: COLONOSCOPY;  Surgeon: David Castaneda MD;  Location: [address]);  Service: Endoscopy;  Laterality: N/A;    DEBRIDEMENT OF LOCAL FLAP Right 9/25/2024    Procedure: DEBRIDEMENT, LOCAL FLAP;  Surgeon: Neto Davalos MD;  Location: [address];  Service: Orthopedics;  Laterality: Right;    ELBOW SURGERY Right 2015    dislocation repair     ESOPHAGOGASTRODUODENOSCOPY  11/07/2012    atrophic gastritis, H pylori testing negative    INCISION AND DRAINAGE FOOT Right 6/2/2021    Procedure: INCISION AND DRAINAGE, FOOT, bone biopsy;  Surgeon: Quiana Penn DPM;  Location: [facility] OR;  Service: Podiatry;  Laterality: Right;    IRRIGATION AND DEBRIDEMENT OF LOWER  EXTREMITY Right 9/25/2024    Procedure: IRRIGATION AND DEBRIDEMENT, LOWER EXTREMITY; slider table, supine, bone foam, cysto tubing, 6L NS/dakins/peroxide, culture swabs;  Surgeon: Neto Davalos MD;  [facility];  Service: Orthopedics;  Laterality: Right;    KNEE SURGERY Bilateral 2015    scoped    LEFT HEART CATHETERIZATION Left 3/29/2019    Procedure: Left heart cath;  Surgeon: Bladimir Barbosa MD;  Location: [facility] CATH LAB;  Service: Cardiology;  Laterality: Left;    LEFT HEART CATHETERIZATION Left 11/18/2019    Procedure: Left heart cath;  Surgeon: Karl Rico MD;  Location: [facility] CATH LAB;  Service: Cardiology;  Laterality: Left;    LEFT HEART CATHETERIZATION Left 1/8/2020    Procedure: Left heart cath, right radial, noon start;  Surgeon: Christos Monreal MD;  Location: [facility] CATH LAB;  Service: Cardiology;  Laterality: Left;  RN Pre Op 1-6-20.  To be admitted 1-7-20 sor Aspirin Disensitation    OPEN REDUCTION AND INTERNAL FIXATION (ORIF) OF FRACTURE OF ACETABULUM Right 8/16/2024    Procedure: ORIF, FRACTURE, ACETABULUM;  Surgeon: Neto Davalos MD;  [facility];  Service: Orthopedics;  Laterality: Right;  anterior and lateral pelvic incisions    OPEN REDUCTION AND INTERNAL FIXATION (ORIF) OF PILON FRACTURE Right 8/14/2024    Procedure: ORIF, FRACTURE, PILON;  Surgeon: Neto Davalos MD;  Location: [facility] OR [address] [place] [address];  Service: Orthopedics;  Laterality: Right;    TONSILLECTOMY  1955    ULTRASOUND GUIDANCE  1/8/2020    Procedure: Ultrasound Guidance;  Surgeon: Christos Monreal MD;  Location: [facility] CATH LAB;  Service: Cardiology;;       Time Tracking:     OT Date of Treatment: 11/05/24  OT Start Time: 0840  OT Stop Time: 0916  OT Total Time (min): 36 min    Billable Minutes:Evaluation 13  Self Care/Home Management 23 11/5/2024

## 2024-11-05 NOTE — TELEPHONE ENCOUNTER
----- Message from Tamir sent at 11/5/2024  9:48 AM CST -----  Regarding: [facility]  Type: Patient Call Back    Who called:[facility]    What is the request in detail:calling to get a hospital follow up for within 1 week    Can the clinic reply by [facility]?no    Would the patient rather a call back or a response via [facility]? callback    Best call back number:[phone]    Additional Information:

## 2024-11-05 NOTE — PHYSICIAN QUERY
Please clarify the integumentary diagnosis related to the documentation outlined in the query message.  Pressure Injury/Decubitus Ulcer, Stage 2

## 2024-11-05 NOTE — ASSESSMENT & PLAN NOTE
Creatine stable for now. BMP reviewed- noted Estimated Creatinine Clearance: 29.8 mL/min (A) (based on SCr of 1.9 mg/dL (H)). according to latest data. Based on current GFR, CKD stage is stage 3 - GFR 30-59 with acute KYA.      Plan:  - Monitor UOP and serial BMP and adjust therapy as needed.   - Renally dose meds.   - Avoid nephrotoxic medications and procedures.  - hold lasix

## 2024-11-05 NOTE — ASSESSMENT & PLAN NOTE
Diagnosed with C. Diff on admission from 9/24-10/7, likely obtained from outpatient rehab facility. She has had persistent watery diarrhea since discharge every 2-3 hours. Discharged on PO vancomycin until 11/7, but taper started early on 10/27 after she developed nausea and vomiting. N/V now resolved but noted a slightly bloody stool on 10/29 and gross bright red blood with clots on 10/30 prompting presentation to ED. She has been on a course of doxycycline as well for ankle surgical infection.    Plan:  - Cdiff panel negative. ID following, appreciate recs  - Continue PO vanc 125 q24 hours for secondary prophylaxis until 11/12   - continue PO doxycycline 100 mg BID until 11/7  - Consult to GI for hematochezia evaluation, appreciate recs  - EGD with no blood present   - Colonoscopy showed friable mucosa throughout the entire colon. Random biopsies taken to rule out microscopic colitis. Also had a few polyps removed.   - daily cbc/cmp  - no probiotics due to persistent glucose drops while taking them  - no zofran due to elevated qtc, can trial scopolamine patch if nauseous  - hold ASA/brilinta   - Will resume aspirin 11/8/24, hold Brilinta until follow up with cardiologist

## 2024-11-05 NOTE — ASSESSMENT & PLAN NOTE
Fell on 8/14 with resultant right ankle and hip fracture. Underwent surgical fixation and was discharged to rehab. On discharge from rehab R ankle wound dehisced. Underwent wound debridement during readmission from 9/24-10/7. Ankle remains wrapped and braced with severe pain relieved by robaxin. Notes shooting pain but previously was unable to tolerate gabapentin. On 6 week doxycycline course until 11/12 complicated by persistent C. Diff colitis.    Plan:  - consult wound care  - Right ankle incision: bedside nursing to cleanse with NS, pat dry, apply a bordered island dressing to the wound, cover with cast padding and loosely secure with an elastic bandage every 3 days   - consult to ID for abx recs, will complete doxycycline 11/7  - continue doxycycline course. Added a PPI due to esophagitis symptoms.   - pain control with oxy 5 q8h prn

## 2024-11-05 NOTE — PLAN OF CARE
Problem: Physical Therapy  Goal: Physical Therapy Goal  Description: Goals to be met by: 2024     Patient will increase functional independence with mobility by performin. Supine to sit with Set-up Orange  2. Sit to supine with Set-up Orange  3. Rolling to Left and Right with Set-up Assistance.  4. Bed to chair transfer with Contact Guard Assistance using Slideboard  5. Wheelchair propulsion x200 feet with Supervision using bilateral uppper extremities  6. Lower extremity exercise program x15 reps per handout, with supervision    Outcome: Progressing

## 2024-11-05 NOTE — PLAN OF CARE
[facility] - Internal Medicine Telemetry      HOME HEALTH ORDERS  FACE TO FACE ENCOUNTER    Patient Name: Lorena Contreras  YOB: 1950    PCP: Jorge Paris MD   PCP Address: [address] / [place] [address]  PCP Phone Number: [phone]  PCP Fax: [phone]    Encounter Date: 10/30/24    Admit to Home Health    Diagnoses:  Active Hospital Problems    Diagnosis  POA    *C. difficile colitis [A04.72]  Yes     Currently treated with po vancomycin      Rectal bleeding [K62.5]  Yes    Anemia [D64.9]  Yes    Elevated troponin level not due myocardial infarction [R79.89]  Yes    Coronary artery disease [I25.10]  Yes    Wound dehiscence, right ankle [T81.30XA]  Yes    Stage 3b chronic kidney disease [N18.32]  Yes    Type 2 diabetes mellitus with stage 3b chronic kidney disease, with long-term current use of insulin [E11.22, N18.32, Z79.4]  Not Applicable    KYA (acute kidney injury) [N17.9]  Yes    Mixed hyperlipidemia [E78.2]  Yes    Anxiety [F41.9]  Yes     Chronic    Primary hypertension [I10]  Yes      Resolved Hospital Problems   No resolved problems to display.       Follow Up Appointments:  Future Appointments   Date Time Provider Department Center   11/6/2024 11:00 AM LAB, [facility] [place]   11/6/2024  2:30 PM Raheem Bucio NP [facility]   11/12/2024  8:30 AM Karl Rico MD [facility] CARDIO [facility] [place] [place] [facility]   11/13/2024  3:00 PM Gerardo Barbour MD [facility] ENDOCRN [facility] [place] [place] [facility]   11/18/2024  1:40 PM Christy Martinez PA-C [facility] GASTRO [facility] [place] [place] [facility]       Allergies:  Review of patient's allergies indicates:   Allergen Reactions    Novolin 70/30 (semi-synthetic) Nausea And Vomiting     Severe vomiting on Relion 70/30    Sulfa (sulfonamide antibiotics) Anaphylaxis    Talwin [pentazocine lactate] Anaphylaxis    Victoza [liraglutide] Nausea And Vomiting    Glipizide Nausea Only    Codeine     Influenza virus vaccines Hives    Iodine and iodide containing products Hives     Levetiracetam Itching    Neurontin [gabapentin]      Possible associated myoclonic jerk    Rituxan [rituximab] Hives    Zoloft [sertraline] Nausea And Vomiting       Medications: Review discharge medications with patient and family and provide education.    Current Facility-Administered Medications   Medication Dose Route Frequency Provider Last Rate Last Admin    acetaminophen tablet 650 mg  650 mg Oral Q6H PRN Radha Simmons DO   650 mg at 11/01/24 1930    albuterol-ipratropium 2.5 mg-0.5 mg/3 mL nebulizer solution 3 mL  3 mL Nebulization Q4H PRN Ayesha Buck MD        dextrose 10% bolus 125 mL 125 mL  12.5 g Intravenous PRN Adrian Givens MD        dextrose 10% bolus 250 mL 250 mL  25 g Intravenous PRN Adrian Givens MD        doxycycline tablet 100 mg  100 mg Oral Q12H Ayesha Buck MD   100 mg at 11/05/24 0821    FLUoxetine capsule 40 mg  40 mg Oral Daily Ayesha Buck MD   40 mg at 11/05/24 0821    glucose chewable tablet 16 g  16 g Oral PRN Ayesha Buck MD        glucose chewable tablet 24 g  24 g Oral PRN Ayesha Buck MD        hydrOXYzine HCL tablet 25 mg  25 mg Oral TID PRN Urmila Angeles MD   25 mg at 11/04/24 2048    isosorbide mononitrate 24 hr tablet 60 mg  60 mg Oral Daily Ayesha Buck MD   60 mg at 11/05/24 0821    melatonin tablet 6 mg  6 mg Oral Nightly PRN Ayesha Buck MD   6 mg at 11/04/24 2048    methocarbamoL tablet 500 mg  500 mg Oral QID PRN Ayesha Buck MD   500 mg at 11/04/24 2048    metoprolol succinate (TOPROL-XL) 24 hr tablet 25 mg  25 mg Oral Daily Ayesha Buck MD   25 mg at 11/05/24 0821    naloxone 0.4 mg/mL injection 0.02 mg  0.02 mg Intravenous PRN Ayesha Buck MD        ondansetron tablet 4 mg  4 mg Oral Once PRN Marita Haywood MD        oxyCODONE immediate release tablet 5 mg  5 mg Oral Q8H PRN Marita Haywood MD   5 mg at 11/04/24 1253    pantoprazole EC tablet 40 mg  40 mg Oral Daily Ericka Manning MD   40 mg at 11/05/24 0821    sodium  chloride 0.9% flush 10 mL  10 mL Intravenous Q12H PRN Ayesha Buck MD        vancomycin 125 mg/5 mL oral solution 125 mg  125 mg Oral Daily Annamaria Valladares MD   125 mg at 11/05/24 0821        Medication List        START taking these medications      pantoprazole 40 MG tablet  Commonly known as: PROTONIX  Take 1 tablet (40 mg total) by mouth once daily.            CHANGE how you take these medications      furosemide 40 MG tablet  Commonly known as: LASIX  Take 1 tablet (40 mg total) by mouth 2 (two) times daily.  Notes to patient: Do not take until follow up with your PCP      hydrALAZINE 100 MG tablet  Commonly known as: APRESOLINE  Take 1 tablet (100 mg total) by mouth every 8 (eight) hours.  Notes to patient: Do not take until follow up with your PCP      ticagrelor 90 mg tablet  Commonly known as: BRILINTA  Take 1 tablet (90 mg total) by mouth 2 (two) times daily.  What changed: Another medication with the same name was removed. Continue taking this medication, and follow the directions you see here.  Notes to patient: Do not take until follow up with your PCP or Cardiologist      vancomycin 125 MG capsule  Commonly known as: VANCOCIN  Take 1 capsule daily until 11/12/2024 then stop.  What changed: additional instructions            CONTINUE taking these medications      albuterol 90 mcg/actuation inhaler  Commonly known as: PROVENTIL/VENTOLIN HFA  Inhale 2 puffs into the lungs every 6 (six) hours as needed for Wheezing or Shortness of Breath.     aluminum & magnesium hydroxide-simethicone 400-400-40 mg/5 mL suspension  Commonly known as: MYLANTA MAX STRENGTH  Take 30 mLs by mouth every 6 (six) hours as needed for Indigestion.     aspirin 81 MG Chew  Take 1 tablet (81 mg total) by mouth once daily. Hold to avoid bleed risk on triple therapy and then resume on oct 27 once eliquis stops on oct 26th  Notes to patient: May resume on 11/8/2024     DEXCOM G7  Misc  Generic drug: blood-glucose  "meter,continuous  Use 1  to track blood glucose, ICD10: E11.65     DEXCOM G7 SENSOR Samina  Generic drug: blood-glucose sensor  Use 1 sensor every 10 days to track blood glucose, ICD10: E11.65, okay with 90 day supply if possible     doxycycline 100 MG tablet  Commonly known as: VIBRA-TABS  Take 1 tablet (100 mg total) by mouth every 12 (twelve) hours. End date 11/7/24     FLUoxetine 40 MG capsule  Take 1 capsule (40 mg total) by mouth once daily.     hydrOXYzine HCL 25 MG tablet  Commonly known as: ATARAX  Take 1 tablet (25 mg total) by mouth 3 (three) times daily as needed for Itching.     insulin aspart U-100 100 unit/mL (3 mL) Inpn pen  Commonly known as: NovoLOG  Inject 0-10 Units into the skin before meals and at bedtime as needed (Hyperglycemia).     insulin glargine U-100 (Lantus) 100 unit/mL (3 mL) Inpn pen  Inject 12 Units into the skin every evening.     isosorbide mononitrate 60 MG 24 hr tablet  Commonly known as: IMDUR  Take 1 tablet (60 mg total) by mouth once daily.     LORazepam 1 MG tablet  Commonly known as: ATIVAN  TAKE 1 TABLET BY MOUTH ONCE DAILY AS NEEDED FOR ANXIETY     metoprolol succinate 25 MG 24 hr tablet  Commonly known as: TOPROL-XL  Take 1 tablet (25 mg total) by mouth once daily.     ondansetron 8 MG Tbdl  Commonly known as: ZOFRAN-ODT  Dissolve 1 tablet (8 mg total) by mouth every 8 (eight) hours as needed (nausea).     oxyCODONE 5 MG immediate release tablet  Commonly known as: ROXICODONE  Take 1 tablet (5 mg total) by mouth every 8 (eight) hours as needed (pain scale 4-6).     OZEMPIC 1 mg/dose (4 mg/3 mL)  Generic drug: semaglutide  Inject 1 mg into the skin every 7 days. HOLD while at [facility]     pen needle, diabetic 32 gauge x 5/32" Ndle  Commonly known as: BD ULTRA-FINE SAGAR PEN NEEDLE  One pen needle use with insulin pen 4 times a day.  ICD-10: E11.9     QUEtiapine 50 MG tablet  Commonly known as: SEROQUEL  Take 1 tablet (50 mg total) by mouth nightly as needed (insomnia).   "              I have seen and examined this patient within the last 30 days. My clinical findings that support the need for the home health skilled services and home bound status are the following:no   Weakness/numbness causing balance and gait disturbance due to Fracture making it taxing to leave home.  Requiring assistive device to leave home due to unsteady gait caused by  Fracture.  Medical restrictions requiring assistance of another human to leave home due to  Unstable ambulation and Weight bearing restricted.     Diet:   cardiac diet    Labs:  SN to perform labs:  CBC: Weekly; 2 week(s)    Referrals/ Consults  Physical Therapy to evaluate and treat. Evaluate for home safety and equipment needs; Establish/upgrade home exercise program. Perform / instruct on therapeutic exercises, gait training, transfer training, and Range of Motion.  Occupational Therapy to evaluate and treat. Evaluate home environment for safety and equipment needs. Perform/Instruct on transfers, ADL training, ROM, and therapeutic exercises.  Aide to provide assistance with personal care, ADLs, and vital signs.    Activities:   activity as tolerated    Nursing:   Agency to admit patient within 24 hours of hospital discharge unless specified on physician order or at patient request    SN to complete comprehensive assessment including routine vital signs. Instruct on disease process and s/s of complications to report to MD. Review/verify medication list sent home with the patient at time of discharge  and instruct patient/caregiver as needed. Frequency may be adjusted depending on start of care date.     Skilled nurse to perform up to 3 visits PRN for symptoms related to diagnosis    Notify MD if SBP > 160 or < 90; DBP > 90 or < 50; HR > 120 or < 50; Temp > 101; O2 < 88%    Ok to schedule additional visits based on staff availability and patient request on consecutive days within the home health episode.    When multiple disciplines  ordered:    Start of Care occurs on Sunday - Wednesday schedule remaining discipline evaluations as ordered on separate consecutive days following the start of care.    Thursday SOC -schedule subsequent evaluations Friday and Monday the following week.     Friday - Saturday SOC - schedule subsequent discipline evaluations on consecutive days starting Monday of the following week.    For all post-discharge communication and subsequent orders please contact patient's primary care physician. If unable to reach primary care physician or do not receive response within 30 minutes, please contact [facility] for clinical staff order clarification      Home Health Aide:  Physical Therapy Daily, Occupational Therapy Three times weekly, and Home Health Aide Daily    Wound Care Orders  yes:  Surgical Wound:  Location: Right ankle       I certify that this patient is confined to her home and needs intermittent skilled nursing care, physical therapy, and occupational therapy.

## 2024-11-05 NOTE — PLAN OF CARE
Problem: Occupational Therapy  Goal: Occupational Therapy Goal  Description: Goals to be met by: 11/12/24     Patient will increase functional independence with ADLs by performing:    UE Dressing with Set-up Assistance.  LE Dressing with Set-up Assistance.  Grooming while seated at sink with Set-up Assistance.  Toileting from toilet with Stand-by Assistance for hygiene and clothing management.   Slide board transfer to wheelchair with Minimal Assistance  Toilet transfer to toilet with Minimal Assistance.  Increased functional strength to WFL for B UE as indicated.    Outcome: Progressing

## 2024-11-05 NOTE — ASSESSMENT & PLAN NOTE
Chronic anemia likely exacerbated by acute blood loss. Most recent hemoglobin and hematocrit are listed below.  Recent Labs     11/03/24  0234 11/04/24  0558 11/05/24  0252   HGB 7.7* 8.4* 7.8*   HCT 24.5* 25.6* 25.0*       Patient has not received any transfusions this admission, however required transfusions on recent admissions    Plan  - daily cbc  - Transfuse PRBC if patient becomes hemodynamically unstable, symptomatic or H/H drops below 7/21. Will transfuse earlier if gross hematochezia continues  - Patient's anemia is currently  being monitored

## 2024-11-05 NOTE — ASSESSMENT & PLAN NOTE
KYA is likely due to pre-renal azotemia due to intravascular volume depletion. Baseline creatinine is  1.5 . Most recent creatinine and eGFR are listed below.  Recent Labs     11/03/24  0234 11/04/24  0558 11/05/24  0252   CREATININE 1.7* 1.6* 1.9*   EGFRNORACEVR 31.3* 33.6* 27.4*       Likely pre-renal in the setting of acute blood loss     Plan  - KYA is  being monitored . Improving.   - Avoid nephrotoxins and renally dose meds for GFR listed above  - Monitor urine output, daily cmp, and adjust therapy as needed  - hold home lasix   done

## 2024-11-05 NOTE — NURSING
PIV removed per hospital policy.  Pt verbalized understanding of discharge paperwork and denied any questions or concerns.  Pt escorted off the unit via [facility] with all personal belongings in hand.  NADN; ACOSTA

## 2024-11-05 NOTE — PT/OT/SLP EVAL
"Physical Therapy Co-Evaluation    Patient Name:  Lorena Contreras   MRN:  [id number]    Co-evaluation and co-treatment performed for this visit due to suspected patient need for two skilled therapists to ensure patient and staff safety and to accommodate for patient activity tolerance/pain management      Recommendations:     Discharge Recommendations: Low Intensity Therapy   Discharge Equipment Recommendations: none   Barriers to discharge: None    Assessment:     Lorena Contreras is a 74 y.o. female admitted with a medical diagnosis of C. difficile colitis.  She presents with the following impairments/functional limitations: weakness, impaired endurance, impaired self care skills, impaired functional mobility, gait instability, impaired balance. Pt tolerated session fair. Pt able to complete majority of bed mobility without assistance. Pt knowledgeable of weight bearing precautions, and pt was able to successfully scoot both ways seated EOB with bilateral LE and stand by assistance. Pt reports not walking in the past 2 months. Pt will continue to benefit from skilled PT intervention to progress transfer ability and decrease required level of assistance.     Rehab Prognosis: Fair; patient would benefit from acute skilled PT services to address these deficits and reach maximum level of function.    Recent Surgery: Procedure(s) (LRB):  COLONOSCOPY (N/A) 1 Day Post-Op    Plan:     During this hospitalization, patient to be seen 4 x/week to address the identified rehab impairments via therapeutic activities, therapeutic exercises, neuromuscular re-education, wheelchair management/training and progress toward the following goals:    Plan of Care Expires:  12/05/24    Subjective     Chief Complaint: None stated  Patient/Family Comments/goals: Pt agreeable to PT. "I can sit on the side of the bed."  Pain/Comfort:  Pain Rating 1: 3/10  Location - Side 1: Right  Location 1: hip  Pain Addressed 1: Reposition, " Distraction    Patients cultural, spiritual, Mu-ism conflicts given the current situation: no    Living Environment:  Pt lives with her grandson in a [facility] with a ramp entrance. Pt will have someone there to assist at all times between grandson, sister, and 2 daughters.  Prior to admission, patients level of function was requiring standby assistance for wheelchair level mobility.  Equipment used at home: bath bench, drop arm commode, hospital bed, rollator, walker, rolling, slide board, wheelchair.  DME owned (not currently used): single point cane.  Upon discharge, patient will have assistance from grandson, sister, daughters.    Objective:     Communicated with nursing prior to session.  Patient found supine with telemetry  upon PT entry to room.    General Precautions: Standard, fall  Orthopedic Precautions:RLE non weight bearing   Braces: AFO  Respiratory Status: Room air    Exams:  RLE ROM: WFL  RLE Strength: resistance not applied due to pain. Pt at least 3/5 in major muscle groups  LLE ROM: WFL  LLE Strength: Hip flexion 3/5; knee 4/5    Functional Mobility:  Bed Mobility:     Rolling Left:  stand by assistance  Scooting: stand by assistance. Pt performed side scooting to the left and right 3x each way. Small scoots but pt successful to both sides.  Supine to Sit: stand by assistance  Sit to Supine: minimum assistance with RLE to clear the edge of bed.   Balance: sitting: pt sat EOB for 20 minutes with stand by assist.       AM-PAC 6 CLICK MOBILITY  Total Score:13       Treatment & Education:  Pt transferred to the EOB and maintained balance EOB for 20 minutes, performed bilateral lateral scooting, and transferred back into supine. Pt educated on PT goals and POC, as well as LE exercises for strengthening, specifically hip strengthening. Pt also educated on bed mobility strategies and the importance of performing transfers with the least amount of assistance required for strength maintenance.     Patient  left supine with all lines intact and call button in reach.    GOALS:   Multidisciplinary Problems       Physical Therapy Goals          Problem: Physical Therapy    Goal Priority Disciplines Outcome Interventions   Physical Therapy Goal     PT, PT/OT Progressing    Description: Goals to be met by: 2024     Patient will increase functional independence with mobility by performin. Supine to sit with Set-up [place]  2. Sit to supine with Set-up [place]  3. Rolling to Left and Right with Set-up Assistance.  4. Bed to chair transfer with Contact Guard Assistance using Slideboard  5. Wheelchair propulsion x200 feet with Supervision using bilateral uppper extremities  6. Lower extremity exercise program x15 reps per handout, with supervision                         History:     Past Medical History:   Diagnosis Date    Allergy     Altered mental status 2022    DYSARTHRIA, SPASTIC MOVEMENTS & DIFFICULTY SWALLOWING    Anemia     Anxiety     Arthritis     Cataract     both removed    Colon polyps     Coronary artery disease     Depression     Diabetes mellitus, type II     Disorder of kidney and ureter     Epilepsia partialis continua 2023    Fibromyalgia     Follicular lymphoma     GERD (gastroesophageal reflux disease)     HTN (hypertension)     Hyperlipidemia     MI (myocardial infarction) 2019    Personal history of colonic polyps     Restless leg syndrome     Stroke        Past Surgical History:   Procedure Laterality Date    APPLICATION OF WOUND VACUUM-ASSISTED CLOSURE DEVICE Right 2024    Procedure: APPLICATION, WOUND VAC;  Surgeon: Neto Davalos MD;  Location: [facility] OR [address] [place] [address];  Service: Orthopedics;  Laterality: Right;    COLONOSCOPY  2012    Colon polyp found; repeat in 5 years    COLONOSCOPY N/A 2024    Procedure: COLONOSCOPY;  Surgeon: David Castaneda MD;  [facility]  Service: Endoscopy;  Laterality: N/A;    DEBRIDEMENT OF LOCAL  FLAP Right 9/25/2024    Procedure: DEBRIDEMENT, LOCAL FLAP;  Surgeon: Neto Davalos MD;  [facility];  Service: Orthopedics;  Laterality: Right;    ELBOW SURGERY Right 2015    dislocation repair     ESOPHAGOGASTRODUODENOSCOPY  11/07/2012    atrophic gastritis, H pylori testing negative    INCISION AND DRAINAGE FOOT Right 6/2/2021    Procedure: INCISION AND DRAINAGE, FOOT, bone biopsy;  Surgeon: Quiana Penn DPM;  Location: [facility];  Service: Podiatry;  Laterality: Right;    IRRIGATION AND DEBRIDEMENT OF LOWER EXTREMITY Right 9/25/2024    Procedure: IRRIGATION AND DEBRIDEMENT, LOWER EXTREMITY; slider table, supine, bone foam, cysto tubing, 6L NS/dakins/peroxide, culture swabs;  Surgeon: Neto Davalos MD;  Location: [address];  Service: Orthopedics;  Laterality: Right;    KNEE SURGERY Bilateral 2015    scoped    LEFT HEART CATHETERIZATION Left 3/29/2019    Procedure: Left heart cath;  Surgeon: Bladimir Barbosa MD;  Location: [facility] CATH LAB;  Service: Cardiology;  Laterality: Left;    LEFT HEART CATHETERIZATION Left 11/18/2019    Procedure: Left heart cath;  Surgeon: Karl Rico MD;  Location: [facility] CATH LAB;  Service: Cardiology;  Laterality: Left;    LEFT HEART CATHETERIZATION Left 1/8/2020    Procedure: Left heart cath, right radial, noon start;  Surgeon: Christos Monreal MD;  Location: [facility] CATH LAB;  Service: Cardiology;  Laterality: Left;  RN Pre Op 1-6-20.  To be admitted 1-7-20 sor Aspirin Disensitation    OPEN REDUCTION AND INTERNAL FIXATION (ORIF) OF FRACTURE OF ACETABULUM Right 8/16/2024    Procedure: ORIF, FRACTURE, ACETABULUM;  Surgeon: Neto Davalos MD;  Location: [address];  Service: Orthopedics;  Laterality: Right;  anterior and lateral pelvic incisions    OPEN REDUCTION AND INTERNAL FIXATION (ORIF) OF PILON FRACTURE Right 8/14/2024    Procedure: ORIF, FRACTURE, PILON;  Surgeon: Neto Davalos MD;  Location: [address];   Service: Orthopedics;  Laterality: Right;    TONSILLECTOMY  1955    ULTRASOUND GUIDANCE  1/8/2020    Procedure: Ultrasound Guidance;  Surgeon: Christos Monreal MD;  Location: [facility] CATH LAB;  Service: Cardiology;;       Time Tracking:     PT Received On: 11/05/24  PT Start Time: 0840     PT Stop Time: 0916  PT Total Time (min): 36 min     Billable Minutes: Evaluation 11 and Therapeutic Activity 25      11/05/2024

## 2024-11-05 NOTE — PLAN OF CARE
CM sent HH orders to [facility] via CP.  CM requested CH[facility] team 1 to schedule PCP and gastroenterology appts.  CM called [facility] [phone], spoke with Lexii, informed that pt to be d/c'd today, HH orders are in CP.      9:54 AM  CM spoke with pt in room.  Instructed in d/c process.  [facility]  [facility]ulance transport needed.  Pt requests that CM call pt's daughter Noelle [phone] to arrange transport time; see when Noelle would be able to receive pt.  Pt confirms address is [address] [place] [address], [id number].    12:57 PM  CM called pt's daughter Noelle, who states she is  at the house ready to receive pt.  CM set up ambulance transport for 1 PM.  Nurse Pauly notified.    2:24 PM  Per Amy with [facility], she spoke with pt about their program and she is interested.    ANAID Cardenas, BS, RN, CCM

## 2024-11-05 NOTE — DISCHARGE SUMMARY
David Mijares - Internal Medicine [facility] Medicine  Discharge Summary      Patient Name: Lorena Contreras  MRN: [id number]  ANDREWS: [id number]  Patient Class: IP- Inpatient  Admission Date: 10/30/2024  Hospital Length of Stay: 5 days  Discharge Date and Time:  11/05/2024 2:51 PM  Attending Physician: Marita Haywood MD   Discharging Provider: Javier Harris MD  Primary Care Provider: Jorge Paris MD  Hospital Medicine Team: [facility] 3 Javier Harris MD  Primary Care Team: [facility] 3    HPI:   Lorena Contreras is a 74y female with a pmhx of CKD, CAD (MI 2019), HTN, fibromyalgia, and lymphoma (s/p chemo, in remission) who presented today with large volume bright red blood with clots in her stool. In August she fell injuring her right hip and ankle which required surgical fixation. On discharge from SNF her ankle wound dehisced requiring surgical debridement. At time of that admission she was found to have c. Diff colitis. She was discharged on a 6 week course of doxycycline and PO vancomycin until completion of doxycyline course. Her diarrhea is still occurring every 2-3 hours and 3 days ago she developed nausea and vomiting. Her outpatient ID physician advised her to taper her PO vancomycin from q6h to BID. Yesterday her daughter noticed a darkening of her stools and today she was noted to have a pad filled with bright red blood and clots. While in the ED she developed abdominal pain localized to RLQ. No further bowel movements since and has remained NPO. She notes chills, anxiety, ankle pain, and new abdominal pain but denies any fevers, current dizziness, changes in vision, chest pains, vomiting, or extremity swelling. She reports feeling dizzy when arising in the morning for the past few weeks. Last dose of ASA and brilinta was this morning.     In the ED she was hemodynamically stable with a hgb of 8.8 near her baseline. BUN 67 and Cr 2.7 with baseline of 1.5. AST 51, ALT 37, ALKP 181 all at  baseline. Troponin mildly elevated at 0.071. EKG with no acute changes.         Procedure(s) (LRB):  COLONOSCOPY (N/A)      Hospital Course:   Admitted to Hospital Medicine for further evaluation and management of C.Diff colitis.  Placed in the proper contact precautions. Patient's doxycycline was continued from previous regimen.GI consulted for evaluation of lower GI bleed. Plan for colonoscopy Monday. ID consulted. Patient was initially continued on her Vancomycin but then transitioned to Fidaxomicin on 10/31. Cdiff panel negative. Fidaxomicin was discontinued and patient was started back on PO Vancomycin 125mg daily for secondary prophylaxis while on the Doxycycline. EGD and colonoscopy performed. EGD without any blood. Colonoscopy showed friable mucosa throughout the entire colon. Random biopsies taken to rule out microscopic colitis. Also had a few polyps removed. Patient without any additional bloody bowel movements and is stable for discharge to home with home health.      Goals of Care Treatment Preferences:  Code Status: Partial Code      SDOH Screening:  The patient was screened for utility difficulties, food insecurity, transport difficulties, housing insecurity, and interpersonal safety and there were no concerns identified this admission.     Consults:   Consults (From admission, onward)          Status Ordering Provider     Inpatient consult to Skin Integrity  Practitioner  Once        Provider:  (Not yet assigned)    Acknowledged MADELAINE ANTHONY     Inpatient consult to Infectious Diseases  Once        Provider:  (Not yet assigned)    Completed IVON PAEZ     Inpatient consult to Gastroenterology  Once        Provider:  (Not yet assigned)    Completed IVON PAEZ            Psychiatric  Anxiety  Chronic poorly controlled anxiety requiring PRN ativan near daily. Exacerbated by ongoing health concerns.    Plan:  - continue home fluoxetine  - PRN ativan  - hold home seroquel for elevated qtc,  trial of melatonin and daily ekg    Cardiac/Vascular  Coronary artery disease  Prior MI in 2019. Remains on ASA/Brilinta. Was placed on warfarin post-op but has since stopped. Trop mildly elevated on admission. No acute EKG changes or chest pain.     Noted to have fluid retention in extremities since her fall. Started on 40mg lasix daily. Last echo from 11/2023. Never diagnosed with HF. No SOB or pitting edema indicating excess fluid at this time.      Plan:  - Chronic.   - Trop peaked at 0.096.   - TTE w/EF 60-65%. Mild Pulmonary HTN.   - if develops chest pain - stat EKG, repeat trop  - hold ASA/brilinta in setting of ongoing GI bleed, will resume ASA 11/8, hold Brilinta until cardiology follow up   - hold lasix for kya    Elevated troponin level not due myocardial infarction  Troponin on admission 0.071. EKG only notable for prior infarct. No chest pain or SOB. Likely demand ischemia.    Plan:  Trops peaked at 0.096 then downtrended. Resolved.       Mixed hyperlipidemia  Statin previously held for chronic elevation in LFTs. Will not resume at this time.    Primary hypertension  Patients blood pressure range in the last 24 hours was: BP  Min: 99/54  Max: 189/73.The patient's inpatient anti-hypertensive regimen is listed below:  Current Antihypertensives  isosorbide mononitrate 24 hr tablet 60 mg, Daily, Oral  metoprolol succinate (TOPROL-XL) 24 hr tablet 25 mg, Daily, Oral    Plan  - BP is controlled, no changes needed to their regimen  - avoid nephrotoxic antihypertensives    Renal/  Stage 3b chronic kidney disease  Creatine stable for now. BMP reviewed- noted Estimated Creatinine Clearance: 29.8 mL/min (A) (based on SCr of 1.9 mg/dL (H)). according to latest data. Based on current GFR, CKD stage is stage 3 - GFR 30-59 with acute KYA.      Plan:  - Monitor UOP and serial BMP and adjust therapy as needed.   - Renally dose meds.   - Avoid nephrotoxic medications and procedures.  - hold lasix    KYA (acute kidney  injury)  KYA is likely due to pre-renal azotemia due to intravascular volume depletion. Baseline creatinine is  1.5 . Most recent creatinine and eGFR are listed below.  Recent Labs     11/03/24  0234 11/04/24  0558 11/05/24  0252   CREATININE 1.7* 1.6* 1.9*   EGFRNORACEVR 31.3* 33.6* 27.4*       Likely pre-renal in the setting of acute blood loss     Plan  - KYA is  being monitored . Improving.   - Avoid nephrotoxins and renally dose meds for GFR listed above  - Monitor urine output, daily cmp, and adjust therapy as needed  - hold home lasix    ID  * C. difficile colitis  Diagnosed with C. Diff on admission from 9/24-10/7, likely obtained from outpatient rehab facility. She has had persistent watery diarrhea since discharge every 2-3 hours. Discharged on PO vancomycin until 11/7, but taper started early on 10/27 after she developed nausea and vomiting. N/V now resolved but noted a slightly bloody stool on 10/29 and gross bright red blood with clots on 10/30 prompting presentation to ED. She has been on a course of doxycycline as well for ankle surgical infection.    Plan:  - Cdiff panel negative. ID following, appreciate recs  - Continue PO vanc 125 q24 hours for secondary prophylaxis until 11/12   - continue PO doxycycline 100 mg BID until 11/7  - Consult to GI for hematochezia evaluation, appreciate recs  - EGD with no blood present   - Colonoscopy showed friable mucosa throughout the entire colon. Random biopsies taken to rule out microscopic colitis. Also had a few polyps removed.   - daily cbc/cmp  - no probiotics due to persistent glucose drops while taking them  - no zofran due to elevated qtc, can trial scopolamine patch if nauseous  - hold ASA/brilinta   - Will resume aspirin 11/8/24, hold Brilinta until follow up with cardiologist     Oncology  Anemia  Chronic anemia likely exacerbated by acute blood loss. Most recent hemoglobin and hematocrit are listed below.  Recent Labs     11/03/24 0234  11/04/24  0558 11/05/24  0252   HGB 7.7* 8.4* 7.8*   HCT 24.5* 25.6* 25.0*       Patient has not received any transfusions this admission, however required transfusions on recent admissions    Plan  - daily cbc  - Transfuse PRBC if patient becomes hemodynamically unstable, symptomatic or H/H drops below 7/21. Will transfuse earlier if gross hematochezia continues  - Patient's anemia is currently  being monitored      Endocrine  Type 2 diabetes mellitus with stage 3b chronic kidney disease, with long-term current use of insulin    Lab Results   Component Value Date    HGBA1C 8.2 (H) 07/10/2024     Home regimen: On 12U lantus nightly with SSI at meals.      - Hold oral anti-glycemics while hospitalized  - low dose SSI  - Accu-checks q AC/HS  - Goal glucose 140-180  - Titrate insulin regimen as needed.   - Diabetic diet as tolerated  - Hypoglycemia protocol in place  - If blood glucose greater than 300, please ask patient not to eat food or drink anything other than water until correctional insulin has brought it back below 250  - If patient is NPO, please hold pre-meal Aspart (Daily Detemir/basal insulin is ok to give even if patient is NPO)       GI  Rectal bleeding  See C. Diff colitis    Orthopedic  Wound dehiscence, right ankle  Fell on 8/14 with resultant right ankle and hip fracture. Underwent surgical fixation and was discharged to rehab. On discharge from rehab R ankle wound dehisced. Underwent wound debridement during readmission from 9/24-10/7. Ankle remains wrapped and braced with severe pain relieved by robaxin. Notes shooting pain but previously was unable to tolerate gabapentin. On 6 week doxycycline course until 11/12 complicated by persistent C. Diff colitis.    Plan:  - consult wound care  - Right ankle incision: bedside nursing to cleanse with NS, pat dry, apply a bordered island dressing to the wound, cover with cast padding and loosely secure with an elastic bandage every 3 days   - consult to ID  for abx recs, will complete doxycycline 11/7  - continue doxycycline course. Added a PPI due to esophagitis symptoms.   - pain control with oxy 5 q8h prn        Final Active Diagnoses:    Diagnosis Date Noted POA    PRINCIPAL PROBLEM:  C. difficile colitis [A04.72] 09/29/2024 Yes    Rectal bleeding [K62.5] 10/30/2024 Yes    Anemia [D64.9] 10/30/2024 Yes    Elevated troponin level not due myocardial infarction [R79.89] 10/30/2024 Yes    Coronary artery disease [I25.10] 10/30/2024 Yes    Wound dehiscence, right ankle [T81.30XA] 09/24/2024 Yes    Stage 3b chronic kidney disease [N18.32] 05/28/2024 Yes    Type 2 diabetes mellitus with stage 3b chronic kidney disease, with long-term current use of insulin [E11.22, N18.32, Z79.4] 08/24/2023 Not Applicable    KYA (acute kidney injury) [N17.9] 04/10/2023 Yes    Mixed hyperlipidemia [E78.2] 07/30/2015 Yes    Anxiety [F41.9] 01/24/2014 Yes     Chronic    Primary hypertension [I10] 01/24/2014 Yes      Problems Resolved During this Admission:       Discharged Condition: Stable     Disposition: Home or Self Care    Follow Up:   Follow-up Information       Jorge Paris MD Follow up in 2 week(s).    Specialties: Internal Medicine, Pediatrics  Contact information:  [address] [address] [phone]               [facility] GASTROENTEROLOGY. Schedule an appointment as soon as possible for a visit in 2 week(s).    Specialty: Gastroenterology  Contact information:  [address] [facility] [address]  [phone]             Jorge Paris MD Follow up.    Specialties: Internal Medicine, Pediatrics  Why: CHw called to schedule pcp f/u, Tamir sent message to clinic to call patient to schedule pcp f/u.  Contact information:  [address] [address] [phone]                           Patient Instructions:      Ambulatory referral/consult to Gastroenterology   Standing Status: Future   Referral Priority: Routine Referral Type:  Consultation   Referral Reason: Specialty Services Required   Requested Specialty: Gastroenterology   Number of Visits Requested: 1     Diet Cardiac     Notify your health care provider if you experience any of the following:  temperature >100.4     Notify your health care provider if you experience any of the following:  persistent nausea and vomiting or diarrhea     Notify your health care provider if you experience any of the following:  severe uncontrolled pain     Notify your health care provider if you experience any of the following:  redness, tenderness, or signs of infection (pain, swelling, redness, odor or green/yellow discharge around incision site)     Notify your health care provider if you experience any of the following:  difficulty breathing or increased cough     Notify your health care provider if you experience any of the following:  severe persistent headache     Notify your health care provider if you experience any of the following:  worsening rash     Notify your health care provider if you experience any of the following:  persistent dizziness, light-headedness, or visual disturbances     Notify your health care provider if you experience any of the following:  increased confusion or weakness     Notify your health care provider if you experience any of the following:     Activity as tolerated       Significant Diagnostic Studies: Labs: CMP   Recent Labs   Lab 11/04/24 0558 11/05/24 0252    135*   K 3.7 3.4*    107   CO2 21* 18*   * 175*   BUN 25* 28*   CREATININE 1.6* 1.9*   CALCIUM 9.3 9.0   PROT 6.0 5.5*   ALBUMIN 2.2* 2.1*   BILITOT 0.3 0.2   ALKPHOS 175* 164*   AST 47* 37   ALT 33 27   ANIONGAP 11 10    and CBC   Recent Labs   Lab 11/04/24 0558 11/05/24 0252   WBC 8.48 8.26   HGB 8.4* 7.8*   HCT 25.6* 25.0*    214       Pending Diagnostic Studies:       Procedure Component Value Units Date/Time    EKG 12-lead [[id number]]     Order Status: Sent Lab Status:  No result     Specimen to Pathology, Surgery Gastrointestinal tract [[id number]] Collected: 11/04/24 1057    Order Status: Sent Lab Status: In process Updated: 11/04/24 1433    Specimen: Tissue     Troponin I [[id number]] Collected: 10/31/24 0518    Order Status: Sent Lab Status: No result     Specimen: Blood            Medications:  Reconciled Home Medications:      Medication List        START taking these medications      pantoprazole 40 MG tablet  Commonly known as: PROTONIX  Take 1 tablet (40 mg total) by mouth once daily.            CHANGE how you take these medications      furosemide 40 MG tablet  Commonly known as: LASIX  Take 1 tablet (40 mg total) by mouth 2 (two) times daily.  Notes to patient: Do not take until follow up with your PCP      hydrALAZINE 100 MG tablet  Commonly known as: APRESOLINE  Take 1 tablet (100 mg total) by mouth every 8 (eight) hours.  Notes to patient: Do not take until follow up with your PCP      ticagrelor 90 mg tablet  Commonly known as: BRILINTA  Take 1 tablet (90 mg total) by mouth 2 (two) times daily.  What changed: Another medication with the same name was removed. Continue taking this medication, and follow the directions you see here.  Notes to patient: Do not take until follow up with your PCP or Cardiologist      vancomycin 125 MG capsule  Commonly known as: VANCOCIN  Take 1 capsule daily until 11/12/2024 then stop.  What changed: additional instructions            CONTINUE taking these medications      albuterol 90 mcg/actuation inhaler  Commonly known as: PROVENTIL/VENTOLIN HFA  Inhale 2 puffs into the lungs every 6 (six) hours as needed for Wheezing or Shortness of Breath.     aluminum & magnesium hydroxide-simethicone 400-400-40 mg/5 mL suspension  Commonly known as: MYLANTA MAX STRENGTH  Take 30 mLs by mouth every 6 (six) hours as needed for Indigestion.     aspirin 81 MG Chew  Take 1 tablet (81 mg total) by mouth once daily. Hold to avoid bleed risk on triple  "therapy and then resume on oct 27 once eliquis stops on oct 26th  Notes to patient: May resume on 11/8/2024     DEXCOM G7  Misc  Generic drug: blood-glucose meter,continuous  Use 1  to track blood glucose, ICD10: E11.65     DEXCOM G7 SENSOR Samina  Generic drug: blood-glucose sensor  Use 1 sensor every 10 days to track blood glucose, ICD10: E11.65, okay with 90 day supply if possible     doxycycline 100 MG tablet  Commonly known as: VIBRA-TABS  Take 1 tablet (100 mg total) by mouth every 12 (twelve) hours. End date 11/7/24     FLUoxetine 40 MG capsule  Take 1 capsule (40 mg total) by mouth once daily.     hydrOXYzine HCL 25 MG tablet  Commonly known as: ATARAX  Take 1 tablet (25 mg total) by mouth 3 (three) times daily as needed for Itching.     insulin aspart U-100 100 unit/mL (3 mL) Inpn pen  Commonly known as: NovoLOG  Inject 0-10 Units into the skin before meals and at bedtime as needed (Hyperglycemia).     insulin glargine U-100 (Lantus) 100 unit/mL (3 mL) Inpn pen  Inject 12 Units into the skin every evening.     isosorbide mononitrate 60 MG 24 hr tablet  Commonly known as: IMDUR  Take 1 tablet (60 mg total) by mouth once daily.     LORazepam 1 MG tablet  Commonly known as: ATIVAN  TAKE 1 TABLET BY MOUTH ONCE DAILY AS NEEDED FOR ANXIETY     metoprolol succinate 25 MG 24 hr tablet  Commonly known as: TOPROL-XL  Take 1 tablet (25 mg total) by mouth once daily.     ondansetron 8 MG Tbdl  Commonly known as: ZOFRAN-ODT  Dissolve 1 tablet (8 mg total) by mouth every 8 (eight) hours as needed (nausea).     oxyCODONE 5 MG immediate release tablet  Commonly known as: ROXICODONE  Take 1 tablet (5 mg total) by mouth every 8 (eight) hours as needed (pain scale 4-6).     OZEMPIC 1 mg/dose (4 mg/3 mL)  Generic drug: semaglutide  Inject 1 mg into the skin every 7 days. HOLD while at [facility]     pen needle, diabetic 32 gauge x 5/32" Ndle  Commonly known as: BD ULTRA-FINE SAGAR PEN NEEDLE  One pen needle use with " insulin pen 4 times a day.  ICD-10: E11.9     QUEtiapine 50 MG tablet  Commonly known as: SEROQUEL  Take 1 tablet (50 mg total) by mouth nightly as needed (insomnia).              Indwelling Lines/Drains at time of discharge:   Lines/Drains/Airways       None                   Time spent on the discharge of patient: 30 minutes         Javier Harris MD  Department of Hospital Medicine  [facility] - Internal Medicine Telemetry

## 2024-11-06 ENCOUNTER — TELEPHONE (OUTPATIENT)
Dept: FAMILY MEDICINE | Facility: CLINIC | Age: 74
End: 2024-11-06
Payer: MEDICARE

## 2024-11-06 LAB
FINAL PATHOLOGIC DIAGNOSIS: NORMAL
GROSS: NORMAL
Lab: NORMAL

## 2024-11-06 RX ORDER — OXYCODONE HYDROCHLORIDE 5 MG/1
5 TABLET ORAL EVERY 12 HOURS PRN
Qty: 14 TABLET | Refills: 0 | Status: SHIPPED | OUTPATIENT
Start: 2024-11-06

## 2024-11-06 NOTE — PLAN OF CARE
David Mijares - Internal Medicine Telemetry  Discharge Final Note    Primary Care Provider: Jorge Paris MD    Expected Discharge Date: 11/5/2024    Final Discharge Note (most recent)       Final Note - 11/06/24 1106          Final Note    Assessment Type Final Discharge Note (P)      Anticipated Discharge Disposition Home-Health Care Svc (P)         Post-Acute Status    Post-Acute Authorization Home Health (P)      Home Health Status Set-up Complete/Auth obtained (P)      Coverage [employer] (P)      Discharge Delays None known at this time (P)                      Important Message from Medicare  Important Message from Medicare regarding Discharge Appeal Rights: Given to patient/caregiver, Explained to patient/caregiver, Signed/date by patient/caregiver, Other (comments) (spoke with daughter (Noelle))     Date IMM was signed: 11/05/24  Time IMM was signed: 1128    Contact Info       Jorge Paris MD   Specialty: Internal Medicine, Pediatrics   Relationship: PCP - General    [facility] [address]  [facility] [address]   Phone: [phone]       Next Steps: Follow up in 2 week(s)    [facility] GASTROENTEROLOGY   Specialty: Gastroenterology    [address]  [facility] [address]   Phone: [phone]       Next Steps: Schedule an appointment as soon as possible for a visit in 2 week(s)    Jorge Paris MD   Specialty: Internal Medicine, Pediatrics   Relationship: PCP - General    [facility] [address]  [facility] [address]   Phone: [phone]       Next Steps: Follow up    Instructions: CHw called to schedule pcp f/u, aTmir sent message to clinic to call patient to schedule pcp f/u.        Pt d/c'd to [facility] with ambulance transport.    KAITLYNN CardenasN, BS, RN, CCM

## 2024-11-06 NOTE — TELEPHONE ENCOUNTER
Informed patient's daughter appointment time and date changed was changed to 11/26/24 at 11:20 am . Per Dr. Paris. Patient's daughter voiced understanding.

## 2024-11-07 ENCOUNTER — TELEPHONE (OUTPATIENT)
Dept: ORTHOPEDICS | Facility: CLINIC | Age: 74
End: 2024-11-07
Payer: MEDICARE

## 2024-11-07 NOTE — PROGRESS NOTES
Called and rescheduled pt's post-op appt with Raheem to 11/12 @ 11 am. Pt's daughter was satisfied new appt date/time.

## 2024-11-07 NOTE — TELEPHONE ENCOUNTER
----- Message from Luis Pickett sent at 11/7/2024  2:13 PM CST -----  Regarding: post op appt  Contact: erin Hines   Type:  Needs Medical Advice    Who Called: erin daughter of Lorena is calling to reschedule her appt from 11/06 due to being in the hospital     Would the patient rather a call back or a response via [facility]? Call back   Best Call Back Number: erin Hines   Additional Information:

## 2024-11-08 ENCOUNTER — TELEPHONE (OUTPATIENT)
Dept: GASTROENTEROLOGY | Facility: CLINIC | Age: 74
End: 2024-11-08
Payer: MEDICARE

## 2024-11-08 ENCOUNTER — TELEPHONE (OUTPATIENT)
Dept: ENDOSCOPY | Facility: HOSPITAL | Age: 74
End: 2024-11-08
Payer: MEDICARE

## 2024-11-08 NOTE — TELEPHONE ENCOUNTER
MA returned call/spoke with patients daughter Miss Drake. Would like to switch patients upcoming appointment to a virtual due to transportation. Appointment has been switched.

## 2024-11-08 NOTE — TELEPHONE ENCOUNTER
----- Message from Taurus Sandoval sent at 2024  3:51 PM CST -----  Regarding: Endo case request  Procedure: Colonoscopy    Diagnosis: Surveillance colonoscopy - Hx of colon polyps    Procedure Timin years    Provider: Any GI provider    Location: Any Site    Additional Scheduling Information: No scheduling concerns    Prep Specifications:Standard prep    Is the patient taking a GLP-1 Agonist:no    Have you attached a patient to this message: yes

## 2024-11-08 NOTE — TELEPHONE ENCOUNTER
----- Message from Joleen sent at 11/8/2024 11:38 AM CST -----  .Type: Patient Call Back    Who called:Noelle - daughter    What is the request in detail: Requesting a virtual visit for mom    Can the clinic reply by [facility]? No    Would the patient rather a call back or a response via [facility]? Call Back    Best call back number: [phone]    Additional Information:

## 2024-11-12 ENCOUNTER — OFFICE VISIT (OUTPATIENT)
Dept: ORTHOPEDICS | Facility: CLINIC | Age: 74
End: 2024-11-12
Payer: MEDICARE

## 2024-11-12 ENCOUNTER — HOSPITAL ENCOUNTER (OUTPATIENT)
Dept: RADIOLOGY | Facility: HOSPITAL | Age: 74
Discharge: HOME | End: 2024-11-12
Attending: NURSE PRACTITIONER
Payer: MEDICARE

## 2024-11-12 ENCOUNTER — PATIENT MESSAGE (OUTPATIENT)
Dept: ORTHOPEDICS | Facility: CLINIC | Age: 74
End: 2024-11-12

## 2024-11-12 DIAGNOSIS — S32.401D CLOSED DISPLACED FRACTURE OF RIGHT ACETABULUM WITH ROUTINE HEALING, UNSPECIFIED PORTION OF ACETABULUM, SUBSEQUENT ENCOUNTER: ICD-10-CM

## 2024-11-12 DIAGNOSIS — S32.511D CLOSED FRACTURE OF SUPERIOR RAMUS OF RIGHT PUBIS WITH ROUTINE HEALING, SUBSEQUENT ENCOUNTER: ICD-10-CM

## 2024-11-12 DIAGNOSIS — Z98.890 POST-OPERATIVE STATE: ICD-10-CM

## 2024-11-12 DIAGNOSIS — S82.874D CLOSED NONDISPLACED PILON FRACTURE OF RIGHT TIBIA WITH ROUTINE HEALING, SUBSEQUENT ENCOUNTER: Primary | ICD-10-CM

## 2024-11-12 DIAGNOSIS — S82.874D CLOSED NONDISPLACED PILON FRACTURE OF RIGHT TIBIA WITH ROUTINE HEALING, SUBSEQUENT ENCOUNTER: ICD-10-CM

## 2024-11-12 PROCEDURE — 99999 PR PBB SHADOW E&M-EST. PATIENT-LVL IV: CPT | Mod: PBBFAC,HCNC,, | Performed by: NURSE PRACTITIONER

## 2024-11-12 PROCEDURE — 72170 X-RAY EXAM OF PELVIS: CPT | Mod: 26,HCNC,, | Performed by: INTERNAL MEDICINE

## 2024-11-12 PROCEDURE — 1101F PT FALLS ASSESS-DOCD LE1/YR: CPT | Mod: HCNC,CPTII,S$GLB, | Performed by: NURSE PRACTITIONER

## 2024-11-12 PROCEDURE — 73610 X-RAY EXAM OF ANKLE: CPT | Mod: TC,HCNC,RT

## 2024-11-12 PROCEDURE — 99024 POSTOP FOLLOW-UP VISIT: CPT | Mod: HCNC,S$GLB,, | Performed by: NURSE PRACTITIONER

## 2024-11-12 PROCEDURE — 3062F POS MACROALBUMINURIA REV: CPT | Mod: HCNC,CPTII,S$GLB, | Performed by: NURSE PRACTITIONER

## 2024-11-12 PROCEDURE — 3052F HG A1C>EQUAL 8.0%<EQUAL 9.0%: CPT | Mod: HCNC,CPTII,S$GLB, | Performed by: NURSE PRACTITIONER

## 2024-11-12 PROCEDURE — 3066F NEPHROPATHY DOC TX: CPT | Mod: HCNC,CPTII,S$GLB, | Performed by: NURSE PRACTITIONER

## 2024-11-12 PROCEDURE — 72170 X-RAY EXAM OF PELVIS: CPT | Mod: TC,HCNC

## 2024-11-12 PROCEDURE — 3288F FALL RISK ASSESSMENT DOCD: CPT | Mod: HCNC,CPTII,S$GLB, | Performed by: NURSE PRACTITIONER

## 2024-11-12 PROCEDURE — 73610 X-RAY EXAM OF ANKLE: CPT | Mod: 26,HCNC,RT, | Performed by: INTERNAL MEDICINE

## 2024-11-12 PROCEDURE — 1159F MED LIST DOCD IN RCRD: CPT | Mod: HCNC,CPTII,S$GLB, | Performed by: NURSE PRACTITIONER

## 2024-11-12 PROCEDURE — 1125F AMNT PAIN NOTED PAIN PRSNT: CPT | Mod: HCNC,CPTII,S$GLB, | Performed by: NURSE PRACTITIONER

## 2024-11-12 PROCEDURE — 1160F RVW MEDS BY RX/DR IN RCRD: CPT | Mod: HCNC,CPTII,S$GLB, | Performed by: NURSE PRACTITIONER

## 2024-11-12 RX ORDER — OXYCODONE HYDROCHLORIDE 5 MG/1
5 TABLET ORAL EVERY 8 HOURS PRN
Qty: 21 TABLET | Refills: 0 | Status: SHIPPED | OUTPATIENT
Start: 2024-11-12

## 2024-11-12 NOTE — PROGRESS NOTES
Ms. Contreras is here today for a post-operative visit after undergoing the following:    Fasciocutaneous flap right medial ankle  Excisional debridement of left ankle medial wound including skin, subcutaneous tissue, fascia, bone.  12 x 3 cm     by Dr. Davalos on 11/4/2024.    73-year-old female, diabetes, neuropathy, prior heart attack and stroke with fibromyalgia, restless leg syndrome and multiple medication allergies  Ground level fall 08/13/2024  Right ankle pilon fracture  Right both column acetabular fracture     08/14/2024 - ORIF right ankle pilon fracture     08/16/2024 - ORIF right both column acetabular fracture     Right ankle wound dehiscence 09/22/2024    Interval History:  She reports that she is doing ok.  Since her last visit, she was hospitalized for a GI bleed and treated for C. Diff.  She states their pain is tolerable.  She is taking pain medication.  She is working with therapy following her weight bearing restrictions.  She denies any falls or injuries since surgery/last seen in the clinic.  She denies fever, chills, and sweats since the time of the surgery.     Physical exam:  The patient's incision is open to air and healing.  No signs of infection noted.  She has tactile stimulation to their lower leg, she denies calf pain, there is no leg edema and their pedal pulse is palpable x 2.  No pain with ROM of her ankle.  Hip incision appears to be healing.  ROM of hip is 0-90 degrees.  Dorsiflexion and plantar flexion is about 15 degrees.    RADS:  Xray of the pelvis (judet view) and right ankle was obtained, findings show hardware to right pelvic ring which appears to be in good position and alignment.  Fracture appears to be stable.  No new fracture or hardware failure seen.  Right ankle shows hardware to her distal fibula and tibia appears to be in good position and alignment.  Fracture remains stable.  Ankle mortise is symmetrical.  No evidence of hardware failure or new fracture seen.       Assessment:  Post-op visit (6 weeks)    Plan:    ICD-10-CM ICD-9-CM   1. Closed nondisplaced pilon fracture of right tibia with routine healing, subsequent encounter s/p ORIF 8/17/24  S82.874D V54.16   2. Closed fracture of superior ramus of right pubis with routine healing, subsequent encounter  S32.511D V54.19   3. Post-operative state  Z98.890 V45.89       Current care, treatment plan, precautions, activity level/ modifications, limitations, rehabilitation exercises and proposed future treatment were discussed with the patient. We discussed the need to monitor for changes in symptoms and condition and report them to the physician.  Discussed importance of compliance with all appointments and follow up examinations.     73-year-old female, diabetes, neuropathy, prior heart attack and stroke with fibromyalgia, restless leg syndrome and multiple medication allergies  Ground level fall 08/13/2024  Right ankle pilon fracture  Right both column acetabular fracture     08/14/2024 - ORIF right ankle pilon fracture     08/16/2024 - ORIF right both column acetabular fracture     Right ankle wound dehiscence 09/22/2024 09/25/2024 - I&D right ankle medial wound, fasciocutaneous flap closure     Antibiotics x 6 weeks per ID recs - completed antibiotics.  Cultures No growth.      PHYSICAL THERAPY:   - Physical therapy as ordered.  Patient is currently getting home health.  - Weight bearing will change to weight bear as tolerated in tall cam boot.    - Range of motion as tolerated.   - Strict elevation of the leg to aid in edema control and soft tissue healing      PAIN MEDICATION:   - Pain medication: refill was not needed.  - Pain medication refill policy provided to patient for review, yes.    - Patient was informed a multi-modal approach is used to treat their pain.      FOLLOW UP:   - Patient will follow up in the clinic in 6 weeks.  - X-ray of her right ankle and pelvis is needed.    - Future Appointments:   Future  Appointments   Date Time Provider Department Center   11/13/2024  3:00 PM Gerardo Barbour MD [facility] ENDOCRN [facility]   11/18/2024  1:40 PM Christy Martinez PA-C [facility] GASTRO [facility]   11/26/2024 11:20 AM Jorge Paris MD [facility]   12/27/2024  2:30 PM [facility]       If there are any questions prior to scheduled follow up, the patient was instructed to contact the office

## 2024-11-13 ENCOUNTER — PATIENT OUTREACH (OUTPATIENT)
Dept: ADMINISTRATIVE | Facility: CLINIC | Age: 74
End: 2024-11-13
Payer: MEDICARE

## 2024-11-13 ENCOUNTER — OFFICE VISIT (OUTPATIENT)
Dept: ENDOCRINOLOGY | Facility: CLINIC | Age: 74
End: 2024-11-13
Payer: MEDICARE

## 2024-11-13 DIAGNOSIS — E11.65 UNCONTROLLED TYPE 2 DIABETES MELLITUS WITH HYPERGLYCEMIA, WITH LONG-TERM CURRENT USE OF INSULIN: ICD-10-CM

## 2024-11-13 DIAGNOSIS — G62.9 NEUROPATHY: Primary | ICD-10-CM

## 2024-11-13 DIAGNOSIS — Z79.4 TYPE 2 DIABETES MELLITUS WITH STAGE 3A CHRONIC KIDNEY DISEASE, WITH LONG-TERM CURRENT USE OF INSULIN: ICD-10-CM

## 2024-11-13 DIAGNOSIS — N18.32 TYPE 2 DIABETES MELLITUS WITH STAGE 3B CHRONIC KIDNEY DISEASE, WITH LONG-TERM CURRENT USE OF INSULIN: Primary | ICD-10-CM

## 2024-11-13 DIAGNOSIS — N18.31 TYPE 2 DIABETES MELLITUS WITH STAGE 3A CHRONIC KIDNEY DISEASE, WITH LONG-TERM CURRENT USE OF INSULIN: ICD-10-CM

## 2024-11-13 DIAGNOSIS — E11.22 TYPE 2 DIABETES MELLITUS WITH STAGE 3B CHRONIC KIDNEY DISEASE, WITH LONG-TERM CURRENT USE OF INSULIN: Primary | ICD-10-CM

## 2024-11-13 DIAGNOSIS — E55.9 VITAMIN D DEFICIENCY: ICD-10-CM

## 2024-11-13 DIAGNOSIS — Z79.4 UNCONTROLLED TYPE 2 DIABETES MELLITUS WITH HYPERGLYCEMIA, WITH LONG-TERM CURRENT USE OF INSULIN: ICD-10-CM

## 2024-11-13 DIAGNOSIS — N18.4 CKD (CHRONIC KIDNEY DISEASE), SYMPTOM MANAGEMENT ONLY, STAGE 4 (SEVERE): ICD-10-CM

## 2024-11-13 DIAGNOSIS — M80.00XD AGE-RELATED OSTEOPOROSIS WITH CURRENT PATHOLOGICAL FRACTURE WITH ROUTINE HEALING: ICD-10-CM

## 2024-11-13 DIAGNOSIS — E11.22 TYPE 2 DIABETES MELLITUS WITH STAGE 3A CHRONIC KIDNEY DISEASE, WITH LONG-TERM CURRENT USE OF INSULIN: ICD-10-CM

## 2024-11-13 DIAGNOSIS — Z79.4 TYPE 2 DIABETES MELLITUS WITH STAGE 3B CHRONIC KIDNEY DISEASE, WITH LONG-TERM CURRENT USE OF INSULIN: Primary | ICD-10-CM

## 2024-11-13 DIAGNOSIS — E11.3293 MILD NONPROLIFERATIVE DIABETIC RETINOPATHY OF BOTH EYES ASSOCIATED WITH TYPE 2 DIABETES MELLITUS, MACULAR EDEMA PRESENCE UNSPECIFIED: ICD-10-CM

## 2024-11-13 PROCEDURE — 3062F POS MACROALBUMINURIA REV: CPT | Mod: HCNC,CPTII,95, | Performed by: HOSPITALIST

## 2024-11-13 PROCEDURE — 1111F DSCHRG MED/CURRENT MED MERGE: CPT | Mod: HCNC,CPTII,95, | Performed by: HOSPITALIST

## 2024-11-13 PROCEDURE — 99215 OFFICE O/P EST HI 40 MIN: CPT | Mod: HCNC,95,, | Performed by: HOSPITALIST

## 2024-11-13 PROCEDURE — 3052F HG A1C>EQUAL 8.0%<EQUAL 9.0%: CPT | Mod: HCNC,CPTII,95, | Performed by: HOSPITALIST

## 2024-11-13 PROCEDURE — 3066F NEPHROPATHY DOC TX: CPT | Mod: HCNC,CPTII,95, | Performed by: HOSPITALIST

## 2024-11-13 RX ORDER — INSULIN ASPART 100 [IU]/ML
10 INJECTION, SOLUTION INTRAVENOUS; SUBCUTANEOUS
Qty: 15 ML | Refills: 8 | Status: SHIPPED | OUTPATIENT
Start: 2024-11-13 | End: 2025-11-13

## 2024-11-13 RX ORDER — INSULIN GLARGINE 100 [IU]/ML
10 INJECTION, SOLUTION SUBCUTANEOUS NIGHTLY
Qty: 15 ML | Refills: 6 | Status: SHIPPED | OUTPATIENT
Start: 2024-11-13 | End: 2025-11-13

## 2024-11-13 RX ORDER — PREGABALIN 75 MG/1
75 CAPSULE ORAL NIGHTLY
Qty: 30 CAPSULE | Refills: 0 | Status: SHIPPED | OUTPATIENT
Start: 2024-11-13

## 2024-11-13 RX ORDER — PEN NEEDLE, DIABETIC 30 GX3/16"
NEEDLE, DISPOSABLE MISCELLANEOUS
Qty: 150 EACH | Refills: 11 | Status: SHIPPED | OUTPATIENT
Start: 2024-11-13

## 2024-11-13 NOTE — PROGRESS NOTES
C3 nurse spoke with Lorena Contreras, patient's daughter, Noelle, for a TCC post hospital discharge follow up call. The patient has a scheduled HOSFU appointment with Jorge Paris MD  on 11/26/2024 @ 11:20 AM.

## 2024-11-13 NOTE — ASSESSMENT & PLAN NOTE
- Diabetes is NOT AT GOAL, as indicated by the most recent A1C reviewed on 2024.  - Therapy Goals: Discussed the aim to achieve optimal control without causing hypoglycemia, Targeting an A1C of <7%.  - CGM: Dexcom unavailable  - Diabetic Supplies and Medications: Reviewed to ensure continued refills and compliance.  - Preventive Care: Advised the patient to schedule periodic eye exams and maintain regular foot care monitoring.  - Annual Monitoring: Reviewed the importance of yearly lipid profile (with statin use) and urine protein/creatinine tests.  - Since recent hospitalization 2024: Daughter has been monitoring her diet and her insulin    Plan  - Changes:  Continue adjustment and low-dose Novolo-10 units with meals around lunch/dinner, daughter has been adjusting  - Continue low-dose Lantus 10 units nightly at 8 PM  - Hold Ozempic given GI issue  - Continue CGM use given regular insulin injections daily:  Dexcom G7 at supply company.  - Hypoglycemia Management: Discussed symptoms and treatment of hypoglycemia. The patient is informed  - Clear written instructions provided on AVS. Follow-up scheduled within the next 3 months with lab work prior.   - Appointment date and time were provided to the patient today.

## 2024-11-13 NOTE — ASSESSMENT & PLAN NOTE
- Patient is here for evaluation and management of osteoporosis, initial diagnose: 8/2024: With fragility fracture of right hip and right ankle  - Recent DXA: Independently reviewed in clinic 11/13/2024  - Never been on medication  - History of fragility/post menopause fractures, CKD stage IIIB/ CKD4  - High-risk of falls and fracture now given debility status    Plan  - Discuss treatment options:  Given fragility fracture recently, diagnosed with osteoporosis, given CKD stage 3/4>>Start patient on SC Prolia every 6 months  - Duration of therapy of at minimum 2 years and side effect profile discussed, given High risk of fracture, outweighs the risk of side effects  - Recommend the patient take sufficient over-the-counter calcium and vitamin D3   - Check routine lab work: check Renal Panel, vitamin-D regularly   - Fall precautions/Exercise regimen: advised, exercises recommended  - Routine dental health screening advised, dental cleaning every 6 months.   - Next DXA 2 years  - Routine follow-up with me every 6 months
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
## 2024-11-18 ENCOUNTER — DOCUMENT SCAN (OUTPATIENT)
Dept: HOME HEALTH SERVICES | Facility: HOSPITAL | Age: 74
End: 2024-11-18
Payer: MEDICARE

## 2024-11-21 DIAGNOSIS — S32.401D CLOSED DISPLACED FRACTURE OF RIGHT ACETABULUM WITH ROUTINE HEALING, UNSPECIFIED PORTION OF ACETABULUM, SUBSEQUENT ENCOUNTER: ICD-10-CM

## 2024-11-21 DIAGNOSIS — S82.874D CLOSED NONDISPLACED PILON FRACTURE OF RIGHT TIBIA WITH ROUTINE HEALING, SUBSEQUENT ENCOUNTER: ICD-10-CM

## 2024-11-21 DIAGNOSIS — S32.511D CLOSED FRACTURE OF SUPERIOR RAMUS OF RIGHT PUBIS WITH ROUTINE HEALING, SUBSEQUENT ENCOUNTER: ICD-10-CM

## 2024-11-21 RX ORDER — OXYCODONE HYDROCHLORIDE 5 MG/1
5 TABLET ORAL EVERY 8 HOURS PRN
Qty: 21 TABLET | Refills: 0 | Status: SHIPPED | OUTPATIENT
Start: 2024-11-21

## 2024-11-22 NOTE — TELEPHONE ENCOUNTER
----- Message from Jogre Paris MD sent at 5/28/2024 11:31 PM CDT -----  Please let pt know given her worsening kidney function needs to see NEPHROLOGY - Please contact our Referrals Coordinator at [phone] if you have not received a call within 2 business days to check on the status    
Called patient and left message to contact office in regards to labs.   
Patient return call and was informed of charted test result with recommendation. Patient informed me that the referral department had already got in touch with her and appointment has been made. She was not aware why the appointment was suggest but now understand.  
Rick CARPIO

## 2024-11-25 DIAGNOSIS — S82.874D CLOSED NONDISPLACED PILON FRACTURE OF RIGHT TIBIA WITH ROUTINE HEALING, SUBSEQUENT ENCOUNTER: ICD-10-CM

## 2024-11-25 DIAGNOSIS — S32.401D CLOSED DISPLACED FRACTURE OF RIGHT ACETABULUM WITH ROUTINE HEALING, UNSPECIFIED PORTION OF ACETABULUM, SUBSEQUENT ENCOUNTER: ICD-10-CM

## 2024-11-25 DIAGNOSIS — S32.511D CLOSED FRACTURE OF SUPERIOR RAMUS OF RIGHT PUBIS WITH ROUTINE HEALING, SUBSEQUENT ENCOUNTER: ICD-10-CM

## 2024-11-25 RX ORDER — OXYCODONE HYDROCHLORIDE 5 MG/1
5 TABLET ORAL EVERY 8 HOURS PRN
Qty: 21 TABLET | Refills: 0 | Status: SHIPPED | OUTPATIENT
Start: 2024-11-28

## 2024-11-26 ENCOUNTER — OFFICE VISIT (OUTPATIENT)
Dept: FAMILY MEDICINE | Facility: CLINIC | Age: 74
End: 2024-11-26
Payer: MEDICARE

## 2024-11-26 DIAGNOSIS — I10 PRIMARY HYPERTENSION: Primary | ICD-10-CM

## 2024-11-26 DIAGNOSIS — R11.0 NAUSEA: ICD-10-CM

## 2024-11-26 DIAGNOSIS — F41.9 ANXIETY: Chronic | ICD-10-CM

## 2024-11-26 PROCEDURE — 99214 OFFICE O/P EST MOD 30 MIN: CPT | Mod: HCNC,95,, | Performed by: INTERNAL MEDICINE

## 2024-11-26 PROCEDURE — 3052F HG A1C>EQUAL 8.0%<EQUAL 9.0%: CPT | Mod: HCNC,CPTII,95, | Performed by: INTERNAL MEDICINE

## 2024-11-26 PROCEDURE — 3066F NEPHROPATHY DOC TX: CPT | Mod: HCNC,CPTII,95, | Performed by: INTERNAL MEDICINE

## 2024-11-26 PROCEDURE — 3062F POS MACROALBUMINURIA REV: CPT | Mod: HCNC,CPTII,95, | Performed by: INTERNAL MEDICINE

## 2024-11-26 PROCEDURE — 1111F DSCHRG MED/CURRENT MED MERGE: CPT | Mod: HCNC,CPTII,95, | Performed by: INTERNAL MEDICINE

## 2024-11-26 RX ORDER — FLUOXETINE HYDROCHLORIDE 40 MG/1
40 CAPSULE ORAL DAILY
Qty: 90 CAPSULE | Refills: 3 | Status: SHIPPED | OUTPATIENT
Start: 2024-11-26

## 2024-11-26 RX ORDER — LORAZEPAM 1 MG/1
1 TABLET ORAL DAILY PRN
Qty: 30 TABLET | Refills: 0 | Status: SHIPPED | OUTPATIENT
Start: 2024-11-26

## 2024-11-26 RX ORDER — ONDANSETRON 8 MG/1
8 TABLET, ORALLY DISINTEGRATING ORAL EVERY 8 HOURS PRN
Qty: 20 TABLET | Refills: 2 | Status: SHIPPED | OUTPATIENT
Start: 2024-11-26

## 2024-11-26 NOTE — PROGRESS NOTES
Subjective:     History of Present Illness    CHIEF COMPLAINT:  - Lorena presents for follow-up of anxiety and nausea    HPI:  Lorena reports ongoing anxiety and nausea despite medication. She has increased anxiety and tremors, particularly when therapists visit. Lorena had significantly increased tremors yesterday when the therapist had her bear weight on her foot for the first time since a recent fall. The anxiety is severe enough that she requires medication before healthcare visits, including for this current appointment. She also has nausea and vomiting, particularly around therapy sessions. Lorena reports emesis on a chair after consuming lunch prior to a therapist's visit. The nausea appears to be situational, occurring daily when therapists come, typically around lunchtime.    Lorena was hospitalized from October 30th to November 5th. During her hospital stay, she had severe anxiety leading to hallucinations, described as delirium.    Lorena mentions a family history of anxiety, noting that her grandmother and great-grandmother exhibited severe anxiety and agoraphobic tendencies.    Lorena is currently working on regaining strength in her leg following a fall. She has not operated a vehicle since the fall occurred. The physical therapist has set a goal for her to ambulate before the end of the week, though the patient expresses doubt about achieving this goal.    Lorena was scheduled to see a gastroenterologist on the 18th, but the appointment was canceled due to illness. This appointment needs to be rescheduled.    MEDICATIONS:  - Lorazepam, as needed, for anxiety, helps soothe before therapist visits  - Lasix 40 mg, twice daily  - Metoprolol 25 mg, daily, for blood pressure  - Isosorbide mononitrate 60 mg, daily, for blood pressure  - Hydroxyzine 100 mg, 3 times daily  - Fluoxetine 40 mg  - Zofran, dissolve on tongue, for nausea    MEDICAL HISTORY:  - Anxiety: Longstanding history  - Fall: Recent episode  requiring hospitalization  - Delirium: Episode during recent hospitalization    FAMILY HISTORY:  - Grandmother: History of anxiety  - Great-grandmother: History of anxiety, agoraphobia      ROS:  Gastrointestinal: +nausea, +vomiting  Neurological: +tremors  Psychiatric: +anxiety, -hallucinations         Objective:     There were no vitals filed for this visit.    Physical Exam  HENT:      Head: Normocephalic and atraumatic.   Neurological:      Mental Status: She is alert.   Psychiatric:         Thought Content: Thought content normal.         Judgment: Judgment normal.      Comments: Anxious-appearing           Assessment/Plan     1. Primary hypertension        2. Anxiety  Ambulatory referral/consult to Psychiatry    FLUoxetine 40 MG capsule    LORazepam (ATIVAN) 1 MG tablet      3. Nausea  ondansetron (ZOFRAN-ODT) 8 MG TbDL          Assessment & Plan     Considered psychiatric consultation for anxiety management, given patient's persistent anxious symptoms despite current medication regimen   Evaluated current blood pressure medication dosage, deciding to decrease Imdur from 60mg to 30mg daily due to lower blood pressure readings   Assessed ongoing nausea, particularly in relation to therapy sessions and anxiety   Recognized need for gradual re-introduction to activities outside the home, given patient's prolonged period without leaving due to fall and previous fracture    PLAN SUMMARY:  Continue Lasix 40mg twice daily, metoprolol 25mg daily, and hydroxyzine 100mg thrice daily  Maintain lorazepam as needed for anxiety, especially before therapy sessions  Refilled Zofran (ondansetron) oral dissolving tablets  Decrease Imdur from 60mg to 30mg daily  Discontinue Lyrica  Referral to psychiatry for anxiety management and medication adjustments  Continue physical therapy for mobility and strength improvement  Follow up in the new year  Contact office if issues arise before next appointment    GENERALIZED ANXIETY  DISORDER AND PANIC DISORDER:  Discussed hereditary nature of anxiety disorders.  Explained connection between anxiety and physical symptoms like nausea and vomiting.  Lorena to practice relaxation techniques like meditation and prayer to manage anxiety.  Continued lorazepam as needed for anxiety, particularly before therapy sessions.  Referred to psychiatry for anxiety management and potential medication adjustments.    NAUSEA AND VOMITING:  Refilled Zofran (ondansetron) oral dissolving tablets.    DIFFICULTY IN WALKING:  S/p ORIF of R Tibia August 2024  Lorena to continue working with physical therapists to improve mobility and strength.    HYPERTENSION AND HEART FAILURE:  Decreased Imdur from 60mg to 30mg daily.  Continued Lasix 40mg 2 times daily, metoprolol 25mg daily, and hydroxyzine 100mg 3 times daily.  Discontinued Lyrica.    FOLLOW-UP:  Follow up in the new year.  Contact the office if any issues arise or needs develop before the next scheduled appointment.         This note was generated with the assistance of ambient listening technology. Verbal consent was obtained by the patient and accompanying visitor(s) for the recording of patient appointment to facilitate this note. I attest to having reviewed and edited the generated note for accuracy, though some syntax or spelling errors may persist. Please contact the author of this note for any clarification.      Jorge Paris MD  Internal Medicine-Pediatrics

## 2024-12-03 ENCOUNTER — PATIENT OUTREACH (OUTPATIENT)
Dept: ADMINISTRATIVE | Facility: HOSPITAL | Age: 74
End: 2024-12-03
Payer: MEDICARE

## 2024-12-03 ENCOUNTER — PATIENT MESSAGE (OUTPATIENT)
Dept: FAMILY MEDICINE | Facility: CLINIC | Age: 74
End: 2024-12-03
Payer: MEDICARE

## 2024-12-03 PROCEDURE — G0179 MD RECERTIFICATION HHA PT: HCPCS | Mod: ,,, | Performed by: INTERNAL MEDICINE

## 2024-12-04 DIAGNOSIS — G89.21 CHRONIC PAIN DUE TO TRAUMA: Primary | ICD-10-CM

## 2024-12-04 DIAGNOSIS — S82.874D CLOSED NONDISPLACED PILON FRACTURE OF RIGHT TIBIA WITH ROUTINE HEALING, SUBSEQUENT ENCOUNTER: ICD-10-CM

## 2024-12-04 DIAGNOSIS — S32.401D CLOSED DISPLACED FRACTURE OF RIGHT ACETABULUM WITH ROUTINE HEALING, UNSPECIFIED PORTION OF ACETABULUM, SUBSEQUENT ENCOUNTER: ICD-10-CM

## 2024-12-04 DIAGNOSIS — S32.511D CLOSED FRACTURE OF SUPERIOR RAMUS OF RIGHT PUBIS WITH ROUTINE HEALING, SUBSEQUENT ENCOUNTER: ICD-10-CM

## 2024-12-04 RX ORDER — METHOCARBAMOL 500 MG/1
500 TABLET, FILM COATED ORAL 3 TIMES DAILY
Qty: 30 TABLET | Refills: 0 | Status: SHIPPED | OUTPATIENT
Start: 2024-12-04 | End: 2024-12-14

## 2024-12-04 RX ORDER — OXYCODONE HYDROCHLORIDE 5 MG/1
5 TABLET ORAL EVERY 12 HOURS PRN
Qty: 14 TABLET | Refills: 0 | Status: SHIPPED | OUTPATIENT
Start: 2024-12-04

## 2024-12-04 RX ORDER — ISOSORBIDE MONONITRATE 60 MG/1
60 TABLET, EXTENDED RELEASE ORAL DAILY
Qty: 30 TABLET | Refills: 11 | Status: SHIPPED | OUTPATIENT
Start: 2024-12-04 | End: 2025-12-04

## 2024-12-04 NOTE — TELEPHONE ENCOUNTER
No care due was identified.  [facility] Embedded Care Due Messages. Reference number: [id number].   12/04/2024 9:13:19 AM CST

## 2024-12-05 ENCOUNTER — DOCUMENT SCAN (OUTPATIENT)
Dept: HOME HEALTH SERVICES | Facility: HOSPITAL | Age: 74
End: 2024-12-05
Payer: MEDICARE

## 2024-12-06 ENCOUNTER — DOCUMENT SCAN (OUTPATIENT)
Dept: HOME HEALTH SERVICES | Facility: HOSPITAL | Age: 74
End: 2024-12-06
Payer: MEDICARE

## 2024-12-16 ENCOUNTER — PATIENT OUTREACH (OUTPATIENT)
Dept: ADMINISTRATIVE | Facility: HOSPITAL | Age: 74
End: 2024-12-16
Payer: MEDICARE

## 2024-12-16 RX ORDER — TOBRAMYCIN AND DEXAMETHASONE 3; 1 MG/ML; MG/ML
1 SUSPENSION/ DROPS OPHTHALMIC 4 TIMES DAILY
COMMUNITY
Start: 2024-07-31

## 2024-12-16 NOTE — PROGRESS NOTES
Overdue Diabetes Lab, Diabetes Foot Exam,  Mammogram , Bone Mineral Density, Digital Medicine, Diabetes Education, Medication Adherence, PCP visit,     Care Coordination Encounter Details:       [employer] Portal Status:         [x]  Reviewed MyChart Portal Status offered / enrolled if applicable        Additional Notes:     MyChart Outcomes: Pt is enrolled & active          Updates Requested / Reviewed:        Updated Care Coordination Note, Care Everywhere, , Care Team Updated, Removed  or Duplicate Orders, and Immunizations Reconciliation Completed or Queried: [place]         Health Maintenance Screening(s) Due:      Health Maintenance Topics Overdue:      VBHM Score: 4     Osteoporosis Screening  Hemoglobin A1c  Mammogram  Foot Exam    Influenza Vaccine  Shingles/Zoster Vaccine  RSV Vaccine                  Health Maintenance Topic(s) Outreach Outcomes & Actions Taken:    Breast Cancer Screening - Outreach Outcomes & Actions Taken  : Pt Declined Scheduling Mammogram    Osteoporosis Screening - Outreach Outcomes & Actions Taken  : Patient Declined Scheduling Dexa or Will Call Back to Schedule    Lab(s) - Outreach Outcomes & Actions Taken  : Overdue Lab(s) Ordered and Patient Declined Scheduling Labs or Will Call Back to Schedule    Medication Adherence / Statins - Outreach Outcomes & Actions Taken  : the patient is compliant  with all her DM & HTN medications No Statin Therapy on profile / CCC not updated    Diabetic Foot Exam - Outreach Outcomes & Actions Taken  : unable to complete at this time    Primary Care Appt - Outreach Outcomes & Actions Taken  : Patient Declined Scheduling or Will Call Back to Schedule           Additional Notes:             Chronic Disease Management:     Diabetes Measures        Lab Results   Component Value Date    HGBA1C 8.2 (H) 07/10/2024           [x]  Reviewed chart for active Diabetes diagnosis     [x]  Scheduled necessary follow up appointments if  needed         Additional Notes:  The patient is unable to schedule at this time, she will call back  She is non-mobile due to a broken hip and ankle         Hypertension Measures        BP Readings from Last 1 Encounters:   11/05/24 134/78           [x]  Reviewed chart for active Hypertension diagnosis     []  Reviewed & documented Home BP Cuff     []  Documented a Remote BP if needed & applicable     []  Scheduled necessary follow up appointments with Primary Care if needed         Additional Notes:  Topic not addressed / BP at goal           Provider Team Continuity:     Last PCP Visit Date: 11/26/2024          [x]  Reviewed Primary Care Provider Visits, Annual Wellness Visit, and Future          Appointments to ensure appointments have been scheduled and/or           completed        Additional Notes:  The patient declined to schedule at this time, she will call back  The patient reports that she is non-mobile due to a broken hip and ankle           Social Determinants of Health          [x]  Reviewed, completed, and/or updated the following sections:                  Food Insecurity, Transportation Needs, Financial Resource Strain,                 Tobacco Use        Additional Notes:  SDOH updated on 10/31/2024           Care Management, Digital Medicine, and/or Education Referrals    OPCM Risk Score: 83.6         Next Steps - Referral Actions: No action needed        Additional Notes:  Digital Medicine DM & HTN - the patient declined  Diabetes Education - the patient declined

## 2024-12-17 ENCOUNTER — TELEPHONE (OUTPATIENT)
Dept: FAMILY MEDICINE | Facility: CLINIC | Age: 74
End: 2024-12-17
Payer: MEDICARE

## 2024-12-17 DIAGNOSIS — I25.118 CORONARY ARTERY DISEASE OF NATIVE ARTERY OF NATIVE HEART WITH STABLE ANGINA PECTORIS: ICD-10-CM

## 2024-12-17 DIAGNOSIS — E11.65 UNCONTROLLED TYPE 2 DIABETES MELLITUS WITH HYPERGLYCEMIA, WITH LONG-TERM CURRENT USE OF INSULIN: ICD-10-CM

## 2024-12-17 DIAGNOSIS — Z79.4 UNCONTROLLED TYPE 2 DIABETES MELLITUS WITH HYPERGLYCEMIA, WITH LONG-TERM CURRENT USE OF INSULIN: ICD-10-CM

## 2024-12-17 DIAGNOSIS — S32.511D CLOSED FRACTURE OF SUPERIOR RAMUS OF RIGHT PUBIS WITH ROUTINE HEALING, SUBSEQUENT ENCOUNTER: ICD-10-CM

## 2024-12-17 DIAGNOSIS — S82.874D CLOSED NONDISPLACED PILON FRACTURE OF RIGHT TIBIA WITH ROUTINE HEALING, SUBSEQUENT ENCOUNTER: ICD-10-CM

## 2024-12-17 DIAGNOSIS — S32.401D CLOSED DISPLACED FRACTURE OF RIGHT ACETABULUM WITH ROUTINE HEALING, UNSPECIFIED PORTION OF ACETABULUM, SUBSEQUENT ENCOUNTER: ICD-10-CM

## 2024-12-17 RX ORDER — ATORVASTATIN CALCIUM 80 MG/1
80 TABLET, FILM COATED ORAL NIGHTLY
Qty: 90 TABLET | Refills: 3 | Status: SHIPPED | OUTPATIENT
Start: 2024-12-17

## 2024-12-17 RX ORDER — OXYCODONE HYDROCHLORIDE 5 MG/1
5 TABLET ORAL EVERY 12 HOURS PRN
Qty: 14 TABLET | Refills: 0 | Status: SHIPPED | OUTPATIENT
Start: 2024-12-17

## 2024-12-17 NOTE — TELEPHONE ENCOUNTER
----- Message from Tamir sent at 12/17/2024 10:14 AM CST -----  Regarding: [facility]  Type: Patient Call Back    Who called:[facility]     What is the request in detail:calling in regards of the atorvastatin (LIPITOR) 80 MG tablet wanting to get a refill but was on the discontinued page     Can the clinic reply by [facility]?no    Would the patient rather a call back or a response via [facility]? callback    Best call back number:[phone]    Additional Information:  [facility] [place] - [address]  [address]  [place] [address]  Phone: [phone] Fax: [phone]

## 2024-12-17 NOTE — TELEPHONE ENCOUNTER
----- Message from Retty sent at 12/17/2024  3:55 PM CST -----  Who Called:self    Who Left Message for Patient:staff    Does the patient know what this is regarding?:no    Would the patient rather a call back or a response via [facility]?call    Best Call Back Number:.[phone]    Additional Information:

## 2024-12-17 NOTE — TELEPHONE ENCOUNTER
Call placed to patient to notify that MD sent prescription in regard to lipitor refills to pharmacy. Spoke with daughter Noelle. Verbalized understanding and thank you.

## 2024-12-19 ENCOUNTER — TELEPHONE (OUTPATIENT)
Dept: ENDOCRINOLOGY | Facility: CLINIC | Age: 74
End: 2024-12-19
Payer: MEDICARE

## 2024-12-19 NOTE — TELEPHONE ENCOUNTER
----- Message from Joleen sent at 12/19/2024  1:07 PM CST -----  .Type: Patient Call Back    Who called: Nilda lagos/ [facility]     What is the request in detail: Checking the status of Physician Order that was faxed on 12/18/24    Can the clinic reply by [facility]?No     Would the patient rather a call back or a response via [facility]? Call Back     Best call back number: [phone]    Additional Information:

## 2024-12-19 NOTE — TELEPHONE ENCOUNTER
Spoke to matilda from [facility] and she stated the y had the wrong fax number. Correct number given and will resend everything. Understanding verbalized.

## 2024-12-27 ENCOUNTER — HOSPITAL ENCOUNTER (OUTPATIENT)
Dept: RADIOLOGY | Facility: HOSPITAL | Age: 74
Discharge: HOME | End: 2024-12-27
Attending: NURSE PRACTITIONER
Payer: MEDICARE

## 2024-12-27 ENCOUNTER — OFFICE VISIT (OUTPATIENT)
Dept: ORTHOPEDICS | Facility: CLINIC | Age: 74
End: 2024-12-27
Payer: MEDICARE

## 2024-12-27 DIAGNOSIS — Z98.890 POST-OPERATIVE STATE: ICD-10-CM

## 2024-12-27 DIAGNOSIS — Z98.890 POST-OPERATIVE STATE: Primary | ICD-10-CM

## 2024-12-27 DIAGNOSIS — S82.874D CLOSED NONDISPLACED PILON FRACTURE OF RIGHT TIBIA WITH ROUTINE HEALING, SUBSEQUENT ENCOUNTER: Primary | ICD-10-CM

## 2024-12-27 DIAGNOSIS — S32.511D CLOSED FRACTURE OF SUPERIOR RAMUS OF RIGHT PUBIS WITH ROUTINE HEALING, SUBSEQUENT ENCOUNTER: ICD-10-CM

## 2024-12-27 PROCEDURE — 73610 X-RAY EXAM OF ANKLE: CPT | Mod: 26,HCNC,RT, | Performed by: RADIOLOGY

## 2024-12-27 PROCEDURE — 73610 X-RAY EXAM OF ANKLE: CPT | Mod: TC,HCNC,RT

## 2024-12-27 PROCEDURE — 99999 PR PBB SHADOW E&M-EST. PATIENT-LVL III: CPT | Mod: PBBFAC,HCNC,, | Performed by: NURSE PRACTITIONER

## 2024-12-27 PROCEDURE — 72170 X-RAY EXAM OF PELVIS: CPT | Mod: TC,HCNC

## 2024-12-27 NOTE — PROGRESS NOTES
Ms. Contreras is here today for a post-operative visit after undergoing the following:    Fasciocutaneous flap right medial ankle  Excisional debridement of left ankle medial wound including skin, subcutaneous tissue, fascia, bone.  12 x 3 cm     by Dr. Davalos on 11/4/2024.    73-year-old female, diabetes, neuropathy, prior heart attack and stroke with fibromyalgia, restless leg syndrome and multiple medication allergies  Ground level fall 08/13/2024  Right ankle pilon fracture  Right both column acetabular fracture     08/14/2024 - ORIF right ankle pilon fracture     08/16/2024 - ORIF right both column acetabular fracture     Right ankle wound dehiscence 09/22/2024    Interval History:  She reports that she is doing better.  She is hoping to come out of her boot today.  She reports minimal pain.  She is not taking pain medication.  She is no longer in therapy.  She is walking at home without loss of balance.  She denies any falls or injuries since surgery/last seen in the clinic.  She denies fever, chills, and sweats since the time of the surgery.     Physical exam:  The patient's incision is open to air and healed.  No signs of infection noted.  She has tactile stimulation to their lower leg, she denies calf pain, there is no leg edema and their pedal pulse is palpable x 2.  No pain with ROM of her ankle.  Hip incision has healed.  ROM of hip is 0-90 degrees.  Dorsiflexion and plantar flexion is about 25 degrees.    RADS:  Xray of the pelvis (judet view) and right ankle was obtained, findings show hardware to right pelvic ring which appears to be in good position and alignment.  Fracture appears to be stable.  No new fracture or hardware failure seen.  Right ankle shows hardware to her distal fibula and tibia appears to be in good position and alignment.  Fracture remains stable.  Ankle mortise is symmetrical.  No evidence of hardware failure or new fracture seen.      Assessment:  follow up visit (16  weeks)    Plan:    ICD-10-CM ICD-9-CM   1. Closed nondisplaced pilon fracture of right tibia with routine healing, subsequent encounter s/p ORIF 8/17/24  S82.874D V54.16   2. Closed fracture of superior ramus of right pubis with routine healing, subsequent encounter  S32.511D V54.19       Current care, treatment plan, precautions, activity level/ modifications, limitations, rehabilitation exercises and proposed future treatment were discussed with the patient. We discussed the need to monitor for changes in symptoms and condition and report them to the physician.  Discussed importance of compliance with all appointments and follow up examinations.     74-year-old female, diabetes, neuropathy, prior heart attack and stroke with fibromyalgia, restless leg syndrome and multiple medication allergies  Ground level fall 08/13/2024  Right ankle pilon fracture  Right both column acetabular fracture     08/14/2024 - ORIF right ankle pilon fracture     08/16/2024 - ORIF right both column acetabular fracture     Right ankle wound dehiscence 09/22/2024 09/25/2024 - I&D right ankle medial wound, fasciocutaneous flap closure     Antibiotics x 6 weeks per ID recs - completed antibiotics.  Cultures No growth.      PHYSICAL THERAPY:   - HEP.  - Weight bearing transition from boot to ankle support sleeve, can wean as tolerates.    - Range of motion as tolerated.       FOLLOW UP:   - Patient will follow up in the clinic in 6 months.  - X-ray of her right ankle and pelvis is needed.    - Future Appointments:   Future Appointments   Date Time Provider Department Center   12/30/2024 10:00 AM Greg Cummings Jr., MD [facility]   1/7/2025  9:00 AM Charito Rivera NP [facility]ijares       If there are any questions prior to scheduled follow up, the patient was instructed to contact the office

## 2024-12-30 ENCOUNTER — OFFICE VISIT (OUTPATIENT)
Dept: PAIN MEDICINE | Facility: CLINIC | Age: 74
End: 2024-12-30
Payer: MEDICARE

## 2024-12-30 VITALS
OXYGEN SATURATION: 99 % | SYSTOLIC BLOOD PRESSURE: 184 MMHG | HEART RATE: 83 BPM | DIASTOLIC BLOOD PRESSURE: 81 MMHG | RESPIRATION RATE: 18 BRPM

## 2024-12-30 DIAGNOSIS — S32.401D CLOSED DISPLACED FRACTURE OF RIGHT ACETABULUM WITH ROUTINE HEALING, UNSPECIFIED PORTION OF ACETABULUM, SUBSEQUENT ENCOUNTER: ICD-10-CM

## 2024-12-30 DIAGNOSIS — S82.874D CLOSED NONDISPLACED PILON FRACTURE OF RIGHT TIBIA WITH ROUTINE HEALING, SUBSEQUENT ENCOUNTER: ICD-10-CM

## 2024-12-30 DIAGNOSIS — S32.511D CLOSED FRACTURE OF SUPERIOR RAMUS OF RIGHT PUBIS WITH ROUTINE HEALING, SUBSEQUENT ENCOUNTER: ICD-10-CM

## 2024-12-30 DIAGNOSIS — G89.21 CHRONIC PAIN DUE TO TRAUMA: ICD-10-CM

## 2024-12-30 PROCEDURE — 3079F DIAST BP 80-89 MM HG: CPT | Mod: HCNC,CPTII,S$GLB, | Performed by: PAIN MEDICINE

## 2024-12-30 PROCEDURE — 99999 PR PBB SHADOW E&M-EST. PATIENT-LVL IV: CPT | Mod: PBBFAC,HCNC,, | Performed by: PAIN MEDICINE

## 2024-12-30 PROCEDURE — 1160F RVW MEDS BY RX/DR IN RCRD: CPT | Mod: HCNC,CPTII,S$GLB, | Performed by: PAIN MEDICINE

## 2024-12-30 PROCEDURE — 99204 OFFICE O/P NEW MOD 45 MIN: CPT | Mod: HCNC,S$GLB,, | Performed by: PAIN MEDICINE

## 2024-12-30 PROCEDURE — 1100F PTFALLS ASSESS-DOCD GE2>/YR: CPT | Mod: HCNC,CPTII,S$GLB, | Performed by: PAIN MEDICINE

## 2024-12-30 PROCEDURE — 1125F AMNT PAIN NOTED PAIN PRSNT: CPT | Mod: HCNC,CPTII,S$GLB, | Performed by: PAIN MEDICINE

## 2024-12-30 PROCEDURE — 3066F NEPHROPATHY DOC TX: CPT | Mod: HCNC,CPTII,S$GLB, | Performed by: PAIN MEDICINE

## 2024-12-30 PROCEDURE — 3077F SYST BP >= 140 MM HG: CPT | Mod: HCNC,CPTII,S$GLB, | Performed by: PAIN MEDICINE

## 2024-12-30 PROCEDURE — 3288F FALL RISK ASSESSMENT DOCD: CPT | Mod: HCNC,CPTII,S$GLB, | Performed by: PAIN MEDICINE

## 2024-12-30 PROCEDURE — 1159F MED LIST DOCD IN RCRD: CPT | Mod: HCNC,CPTII,S$GLB, | Performed by: PAIN MEDICINE

## 2024-12-30 PROCEDURE — 3052F HG A1C>EQUAL 8.0%<EQUAL 9.0%: CPT | Mod: HCNC,CPTII,S$GLB, | Performed by: PAIN MEDICINE

## 2024-12-30 PROCEDURE — 3062F POS MACROALBUMINURIA REV: CPT | Mod: HCNC,CPTII,S$GLB, | Performed by: PAIN MEDICINE

## 2024-12-30 RX ORDER — OXYCODONE HYDROCHLORIDE 5 MG/1
5 TABLET ORAL EVERY 12 HOURS PRN
Qty: 60 TABLET | Refills: 0 | Status: SHIPPED | OUTPATIENT
Start: 2024-12-30 | End: 2025-01-29

## 2024-12-30 RX ORDER — NORTRIPTYLINE HYDROCHLORIDE 10 MG/1
CAPSULE ORAL
Qty: 53 CAPSULE | Refills: 0 | Status: SHIPPED | OUTPATIENT
Start: 2024-12-30 | End: 2024-12-30

## 2024-12-30 NOTE — PROGRESS NOTES
Subjective:     Patient ID: Lorena Contreras is a 74 y.o. female    Chief Complaint: Low-back Pain (Right sided achy pain with spasms )      Referred by: Raheem Bucio NP      HPI:    Initial Encounter (12/30/24):  Lorena Contreras is a 74 y.o. female who presents today with chronic right ankle/foot pain.  Significant orthopedic trauma to this area and has undergone multiple surgeries.  Subsequent wound dehiscence and additional surgery required.  Wounds have now completely healed.  Patient is initiating home health therapy to reintroduce ADLs and mobility.  She has been taking oxycodone 5 mg b.i.d. p.r.n..  This has provided adequate relief without any adverse effects.  She does require a neuropathic component of her pain, but has failed gabapentin and Lyrica due to side effects.  She is unable to take NSAIDs and Tylenol due to other medical conditions.   This pain is described in detail below.    Physical Therapy:  Yes.  Currently undergoing home health therapy.    Non-pharmacologic Treatment:  Rest helps         TENS?  No    Pain Medications:         Currently taking:  Oxycodone 5 mg    Has tried in the past:  Lyrica, gabapentin    Has not tried:  NSAIDs, Tylenol, Muscle relaxants, TCAs, SNRIs, topical creams    Blood thinners:  Aspirin 81 mg    Interventional Therapies:  None    Relevant Surgeries:   Multiple right lower extremity orthopedic surgeries    Affecting sleep?  Yes    Affecting daily activities? yes    Depressive symptoms? No          SI/HI? No    Work status: Retired    Pain Scores:    Best:       2/10  Worst:     7/10  Usually:   5/10  Today:    6/10    Pain Disability Index  Family/Home Responsibilities:: 8  Recreation:: 8  Social Activity:: 8  Occupation:: 0  Sexual Behavior:: 0  Self Care:: 5  Life-Support Activities:: 5  Pain Disability Index (PDI): 34    Review of Systems   Constitutional:  Negative for activity change, appetite change, chills, fatigue, fever and unexpected  weight change.   HENT:  Negative for hearing loss.    Eyes:  Negative for visual disturbance.   Respiratory:  Negative for chest tightness and shortness of breath.    Cardiovascular:  Negative for chest pain.   Gastrointestinal:  Negative for abdominal pain, constipation, diarrhea, nausea and vomiting.   Genitourinary:  Negative for difficulty urinating.   Musculoskeletal:  Positive for arthralgias, gait problem and myalgias. Negative for back pain and neck pain.   Skin:  Negative for rash.   Neurological:  Positive for weakness. Negative for dizziness, light-headedness, numbness and headaches.   Psychiatric/Behavioral:  Positive for sleep disturbance. Negative for hallucinations and suicidal ideas. The patient is not nervous/anxious.        Past Medical History:   Diagnosis Date    Age-related osteoporosis with current pathological fracture with routine healing 11/13/2024    Allergy     Altered mental status 06/19/2022    DYSARTHRIA, SPASTIC MOVEMENTS & DIFFICULTY SWALLOWING    Anemia     Anxiety     Arthritis     Cataract     both removed    Colon polyps     Coronary artery disease     Depression     Diabetes mellitus, type II     Disorder of kidney and ureter     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/2023    Fibromyalgia     Follicular lymphoma     GERD (gastroesophageal reflux disease)     HTN (hypertension)     Hyperlipidemia     MI (myocardial infarction) 03/2019    Personal history of colonic polyps     Restless leg syndrome     Stroke        Past Surgical History:   Procedure Laterality Date    APPLICATION OF WOUND VACUUM-ASSISTED CLOSURE DEVICE Right 9/25/2024    Procedure: APPLICATION, WOUND VAC;  Surgeon: Neto Davalos MD;  Location: [facility] OR [address] [place] [address];  Service: Orthopedics;  Laterality: Right;    COLONOSCOPY  11/07/2012    Colon polyp found; repeat in 5 years    COLONOSCOPY N/A 11/4/2024    Procedure: COLONOSCOPY;  Surgeon: David Castaneda MD;  Location: [facility] ([address] [place] [address]);  Service: Endoscopy;   Laterality: N/A;    DEBRIDEMENT OF LOCAL FLAP Right 9/25/2024    Procedure: DEBRIDEMENT, LOCAL FLAP;  Surgeon: Neto Davalos MD;  Location: [address];  Service: Orthopedics;  Laterality: Right;    ELBOW SURGERY Right 2015    dislocation repair     ESOPHAGOGASTRODUODENOSCOPY  11/07/2012    atrophic gastritis, H pylori testing negative    INCISION AND DRAINAGE FOOT Right 6/2/2021    Procedure: INCISION AND DRAINAGE, FOOT, bone biopsy;  Surgeon: Quiana Penn DPM;  Location: [facility];  Service: Podiatry;  Laterality: Right;    IRRIGATION AND DEBRIDEMENT OF LOWER EXTREMITY Right 9/25/2024    Procedure: IRRIGATION AND DEBRIDEMENT, LOWER EXTREMITY; slider table, supine, bone foam, cysto tubing, 6L NS/dakins/peroxide, culture swabs;  Surgeon: Neto Davalos MD;  Location: [address];  Service: Orthopedics;  Laterality: Right;    KNEE SURGERY Bilateral 2015    scoped    LEFT HEART CATHETERIZATION Left 3/29/2019    Procedure: Left heart cath;  Surgeon: Bladimir Barbosa MD;  Location: [facility] CATH LAB;  Service: Cardiology;  Laterality: Left;    LEFT HEART CATHETERIZATION Left 11/18/2019    Procedure: Left heart cath;  Surgeon: Karl Rico MD;  Location: [facility] CATH LAB;  Service: Cardiology;  Laterality: Left;    LEFT HEART CATHETERIZATION Left 1/8/2020    Procedure: Left heart cath, right radial, noon start;  Surgeon: Christos Monreal MD;  Location: [facility] CATH LAB;  Service: Cardiology;  Laterality: Left;  RN Pre Op 1-6-20.  To be admitted 1-7-20 sor Aspirin Disensitation    OPEN REDUCTION AND INTERNAL FIXATION (ORIF) OF FRACTURE OF ACETABULUM Right 8/16/2024    Procedure: ORIF, FRACTURE, ACETABULUM;  Surgeon: Neto Davalos MD;  Location: [address];  Service: Orthopedics;  Laterality: Right;  anterior and lateral pelvic incisions    OPEN REDUCTION AND INTERNAL FIXATION (ORIF) OF PILON FRACTURE Right 8/14/2024    Procedure: ORIF, FRACTURE, PILON;  Surgeon: Anoop  Neto DELAROSA MD;  Location: [facility] OR [address] [place] [address];  Service: Orthopedics;  Laterality: Right;    TONSILLECTOMY  1955    ULTRASOUND GUIDANCE  1/8/2020    Procedure: Ultrasound Guidance;  Surgeon: Christos Monreal MD;  Location: [facility] CATH LAB;  Service: Cardiology;;       Social History     Socioeconomic History    Marital status:     Number of children: 2   Occupational History    Occupation: house wife    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  2 dtr.  Lives with .  3 cats and a dog.  Retired.  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  Lives in house, 1 story and 4 steps up and has a ramp.      Enjoys crafting.  Unable to bowl due to myalgias.       Social Drivers of Health     Financial Resource Strain: Low Risk  (10/31/2024)    Overall Financial Resource Strain (CARDIA)     Difficulty of Paying Living Expenses: Not hard at all   Recent Concern: Financial Resource Strain - Medium Risk (10/31/2024)    Overall Financial Resource Strain (CARDIA)     Difficulty of Paying Living Expenses: Somewhat hard   Food Insecurity: No Food Insecurity (10/31/2024)    Hunger Vital Sign     Worried About Running Out of Food in the Last Year: Never true     Ran Out of Food in the Last Year: Never true   Transportation Needs: No Transportation Needs (10/31/2024)    TRANSPORTATION NEEDS     Transportation : No   Physical Activity: Inactive (10/31/2024)    Exercise Vital Sign     Days of Exercise per Week: 0 days     Minutes of Exercise per Session: 0 min   Stress: Stress Concern Present (10/31/2024)    [place] citizen [place] [employer] - Occupational Stress Questionnaire     Feeling of Stress : Very much   Housing Stability: Low Risk  (10/31/2024)    Housing Stability Vital Sign      Unable to Pay for Housing in the Last Year: No     Homeless in the Last Year: No       Review of patient's allergies indicates:   Allergen Reactions    Novolin 70/30 (semi-synthetic) Nausea And Vomiting     Severe vomiting on Relion 70/30    Sulfa (sulfonamide antibiotics) Anaphylaxis    Talwin [pentazocine lactate] Anaphylaxis    Victoza [liraglutide] Nausea And Vomiting    Glipizide Nausea Only    Codeine     Influenza virus vaccines Hives    Iodine and iodide containing products Hives    Levetiracetam Itching    Lyrica [pregabalin] Hallucinations    Neurontin [gabapentin]      Possible associated myoclonic jerk    Rituxan [rituximab] Hives    Zoloft [sertraline] Nausea And Vomiting       Current Outpatient Medications on File Prior to Visit   Medication Sig Dispense Refill    albuterol (PROVENTIL/VENTOLIN HFA) 90 mcg/actuation inhaler Inhale 2 puffs into the lungs every 6 (six) hours as needed for Wheezing or Shortness of Breath. 18 g 1    aluminum & magnesium hydroxide-simethicone (MYLANTA MAX STRENGTH) 400-400-40 mg/5 mL suspension Take 30 mLs by mouth every 6 (six) hours as needed for Indigestion.      aspirin 81 MG Chew Take 1 tablet (81 mg total) by mouth once daily. Hold to avoid bleed risk on triple therapy and then resume on oct 27 once eliquis stops on oct 26th      atorvastatin (LIPITOR) 80 MG tablet Take 1 tablet (80 mg total) by mouth every evening. 90 tablet 3    DEXCOM G7  Misc Use 1  to track blood glucose, ICD10: E11.65 1 each 0    DEXCOM G7 SENSOR Samina Use 1 sensor every 10 days to track blood glucose, ICD10: E11.65, okay with 90 day supply if possible 3 each 11    FLUoxetine 40 MG capsule Take 1 capsule (40 mg total) by mouth once daily. 90 capsule 3    furosemide (LASIX) 40 MG tablet Take 1 tablet (40 mg total) by mouth 2 (two) times daily. 60 tablet 2    hydrALAZINE (APRESOLINE) 100 MG tablet Take 1 tablet (100 mg total) by mouth every 8 (eight) hours. 135 tablet 3     "hydrOXYzine HCL (ATARAX) 25 MG tablet Take 1 tablet (25 mg total) by mouth 3 (three) times daily as needed for Itching. 60 tablet 1    insulin aspart U-100 (NOVOLOG) 100 unit/mL (3 mL) InPn pen Inject 0-10 Units into the skin before meals and at bedtime as needed (Hyperglycemia).      insulin glargine U-100, Lantus, 100 unit/mL (3 mL) SubQ InPn pen Inject 10 Units into the skin every evening. 15 mL 6    isosorbide mononitrate (IMDUR) 60 MG 24 hr tablet Take 1 tablet (60 mg total) by mouth once daily. 30 tablet 11    LORazepam (ATIVAN) 1 MG tablet Take 1 tablet (1 mg total) by mouth daily as needed for Anxiety. 30 tablet 0    metoprolol succinate (TOPROL-XL) 25 MG 24 hr tablet Take 1 tablet (25 mg total) by mouth once daily. 90 tablet 3    NOVOLOG FLEXPEN U-100 INSULIN 100 unit/mL (3 mL) InPn pen Inject 10 Units into the skin 3 (three) times daily with meals. 15 mL 8    ondansetron (ZOFRAN-ODT) 8 MG TbDL Dissolve 1 tablet (8 mg total) by mouth every 8 (eight) hours as needed (nausea). 20 tablet 2    pantoprazole (PROTONIX) 40 MG tablet Take 1 tablet (40 mg total) by mouth once daily. 90 tablet 3    pen needle, diabetic (BD ULTRA-FINE SAGAR PEN NEEDLE) 32 gauge x 5/32" Ndle One pen needle use with insulin pen 4 times a day.  ICD-10: E11.9 150 each 11    QUEtiapine (SEROQUEL) 50 MG tablet Take 1 tablet (50 mg total) by mouth nightly as needed (insomnia). 90 tablet 3    ticagrelor (BRILINTA) 90 mg tablet Take 1 tablet (90 mg total) by mouth 2 (two) times daily. 180 tablet 3    tobramycin-dexAMETHasone 0.3-0.1% (TOBRADEX) 0.3-0.1 % DrpS Place 1 drop into the right eye 4 (four) times daily.      [DISCONTINUED] oxyCODONE (ROXICODONE) 5 MG immediate release tablet Take 1 tablet (5 mg total) by mouth every 12 (twelve) hours as needed for Pain. 14 tablet 0     No current facility-administered medications on file prior to visit.       Objective:      BP (!) 184/81 (BP Location: Left arm, Patient Position: Sitting)   Pulse 83  "  Resp 18   SpO2 99%     Exam:  GEN:  Well developed, well nourished.  No acute distress.   HEENT:  No trauma.  Mucous membranes moist.  Nares patent bilaterally.  PSYCH: Normal affect. Thought content appropriate.  CHEST:  Breathing symmetric.  No audible wheezing.  ABD: Soft, non-distended.  SKIN:  Warm, pink, dry.  No rash on exposed areas.    EXT:  No cyanosis, clubbing, or edema.  No color change or changes in nail or hair growth.  NEURO/MUSCULOSKELETAL:  Fully alert, oriented, and appropriate. Speech normal janak. No cranial nerve deficits.   Gait:  In manual wheelchair.  No focal motor deficits.     Well-healed surgical scars to distal right lower extremity      Imaging:    Narrative & Impression    EXAMINATION:  XR ANKLE COMPLETE 3 VIEW RIGHT     CLINICAL HISTORY:  Nondisplaced pilon fracture of right tibia, subsequent encounter for closed fracture with routine healing     TECHNIQUE:  AP, lateral, and oblique images of the right ankle were performed.     COMPARISON:  October 9, 2024     FINDINGS:  Again, there are surgical changes fixating the distal fibula, medial malleolus and posterior malleolus fractures.  Hardware is intact.  Alignment is satisfactory.  Degenerative changes are noted at the tibiotalar joint.  There is osseous demineralization.  Plantar calcaneal spurring present.  Vascular calcification within the soft tissue.     Impression:     As above        Electronically signed by:Liv Mazariegos MD  Date:                                            11/12/2024  Time:                                           11:10       Assessment:       Encounter Diagnoses   Name Primary?    Closed nondisplaced pilon fracture of right tibia with routine healing, subsequent encounter s/p ORIF 8/17/24     Closed displaced fracture of right acetabulum with routine healing, unspecified portion of acetabulum, subsequent encounter     Chronic pain due to trauma     Closed fracture of superior ramus of right pubis  with routine healing, subsequent encounter          Plan:       Lorena was seen today for low-back pain.    Diagnoses and all orders for this visit:    Closed nondisplaced pilon fracture of right tibia with routine healing, subsequent encounter s/p ORIF 8/17/24  -     Ambulatory referral/consult to Pain Clinic  -     oxyCODONE (ROXICODONE) 5 MG immediate release tablet; Take 1 tablet (5 mg total) by mouth every 12 (twelve) hours as needed for Pain.  -     Discontinue: nortriptyline (PAMELOR) 10 MG capsule; Take 1 capsule (10 mg total) by mouth every evening for 7 days, THEN 2 capsules (20 mg total) every evening for 23 days.    Closed displaced fracture of right acetabulum with routine healing, unspecified portion of acetabulum, subsequent encounter  -     Ambulatory referral/consult to Pain Clinic  -     oxyCODONE (ROXICODONE) 5 MG immediate release tablet; Take 1 tablet (5 mg total) by mouth every 12 (twelve) hours as needed for Pain.  -     Discontinue: nortriptyline (PAMELOR) 10 MG capsule; Take 1 capsule (10 mg total) by mouth every evening for 7 days, THEN 2 capsules (20 mg total) every evening for 23 days.    Chronic pain due to trauma  -     Ambulatory referral/consult to Pain Clinic  -     Discontinue: nortriptyline (PAMELOR) 10 MG capsule; Take 1 capsule (10 mg total) by mouth every evening for 7 days, THEN 2 capsules (20 mg total) every evening for 23 days.    Closed fracture of superior ramus of right pubis with routine healing, subsequent encounter  -     oxyCODONE (ROXICODONE) 5 MG immediate release tablet; Take 1 tablet (5 mg total) by mouth every 12 (twelve) hours as needed for Pain.  -     Discontinue: nortriptyline (PAMELOR) 10 MG capsule; Take 1 capsule (10 mg total) by mouth every evening for 7 days, THEN 2 capsules (20 mg total) every evening for 23 days.        Lorena Contreras is a 74 y.o. female with chronic posttraumatic right ankle/foot pain.  History of multiple orthopedic surgeries.   Likely has primarily somatic pain though may have some degree of neuropathic pain as well..    Pertinent imaging studies reviewed by me. Imaging results were discussed with patient.  I stressed the importance of physical therapy and resuming normal range of motion and mobility.  Patient and daughter expressed understanding.    I stressed that she will likely have some degree of chronic right foot and ankle pain given the severity of her injury and subsequent surgeries.  Patient expressed understanding and agreement.    Okay to continue oxycodone 5 mg b.i.d. p.r.n..  30 day supply of 60 tablets provided today.  We did discuss that I do not intend to continue his medication chronically, but I feel it is appropriate to help manage her pain while initiating physical therapy.    Would consider medications for neuropathic pain.  Initially planned to start nortriptyline, but due to interaction with fluoxetine we will not utilize this medication.  Patient has already failed gabapentin and Lyrica.  Return to clinic in 3 weeks or sooner if needed.  At that time we will discuss progress with physical therapy and efficacy of oxycodone.  We can make any necessary adjustments at that time.            This note was created by combination of typed  and M-Modal dictation. Transcription and phonetic errors may be present.  If there are any questions, please contact me.

## 2025-01-01 ENCOUNTER — RESULTS FOLLOW-UP (OUTPATIENT)
Dept: INTERNAL MEDICINE | Facility: CLINIC | Age: 75
End: 2025-01-01

## 2025-01-03 ENCOUNTER — DOCUMENT SCAN (OUTPATIENT)
Dept: HOME HEALTH SERVICES | Facility: HOSPITAL | Age: 75
End: 2025-01-03
Payer: MEDICARE

## 2025-01-08 ENCOUNTER — PATIENT MESSAGE (OUTPATIENT)
Dept: ADMINISTRATIVE | Facility: HOSPITAL | Age: 75
End: 2025-01-08
Payer: MEDICARE

## 2025-01-14 ENCOUNTER — OFFICE VISIT (OUTPATIENT)
Dept: FAMILY MEDICINE | Facility: CLINIC | Age: 75
End: 2025-01-14
Payer: MEDICARE

## 2025-01-14 ENCOUNTER — LAB VISIT (OUTPATIENT)
Dept: LAB | Facility: HOSPITAL | Age: 75
End: 2025-01-14
Payer: MEDICARE

## 2025-01-14 ENCOUNTER — TELEPHONE (OUTPATIENT)
Dept: FAMILY MEDICINE | Facility: CLINIC | Age: 75
End: 2025-01-14

## 2025-01-14 VITALS
TEMPERATURE: 98 F | SYSTOLIC BLOOD PRESSURE: 160 MMHG | HEART RATE: 83 BPM | BODY MASS INDEX: 25.76 KG/M2 | HEIGHT: 68 IN | DIASTOLIC BLOOD PRESSURE: 68 MMHG | WEIGHT: 170 LBS | OXYGEN SATURATION: 98 %

## 2025-01-14 DIAGNOSIS — R42 VERTIGO: ICD-10-CM

## 2025-01-14 DIAGNOSIS — G56.02 CARPAL TUNNEL SYNDROME ON LEFT: ICD-10-CM

## 2025-01-14 DIAGNOSIS — Z00.00 ROUTINE HEALTH MAINTENANCE: ICD-10-CM

## 2025-01-14 DIAGNOSIS — R42 DIZZINESS: Primary | ICD-10-CM

## 2025-01-14 DIAGNOSIS — F41.9 ANXIETY: Chronic | ICD-10-CM

## 2025-01-14 DIAGNOSIS — R79.9 ABNORMAL FINDING OF BLOOD CHEMISTRY, UNSPECIFIED: ICD-10-CM

## 2025-01-14 DIAGNOSIS — L30.9 DERMATITIS: ICD-10-CM

## 2025-01-14 DIAGNOSIS — L29.9 ITCHING: ICD-10-CM

## 2025-01-14 LAB
ALBUMIN SERPL BCP-MCNC: 3.1 G/DL (ref 3.5–5.2)
ALP SERPL-CCNC: 192 U/L (ref 40–150)
ALT SERPL W/O P-5'-P-CCNC: 40 U/L (ref 10–44)
ANION GAP SERPL CALC-SCNC: 17 MMOL/L (ref 8–16)
AST SERPL-CCNC: 50 U/L (ref 10–40)
BASOPHILS # BLD AUTO: 0.1 K/UL (ref 0–0.2)
BASOPHILS NFR BLD: 1 % (ref 0–1.9)
BILIRUB SERPL-MCNC: 0.4 MG/DL (ref 0.1–1)
BUN SERPL-MCNC: 49 MG/DL (ref 8–23)
CALCIUM SERPL-MCNC: 9.6 MG/DL (ref 8.7–10.5)
CHLORIDE SERPL-SCNC: 103 MMOL/L (ref 95–110)
CHOLEST SERPL-MCNC: 170 MG/DL (ref 120–199)
CHOLEST/HDLC SERPL: 4 {RATIO} (ref 2–5)
CO2 SERPL-SCNC: 19 MMOL/L (ref 23–29)
CREAT SERPL-MCNC: 2 MG/DL (ref 0.5–1.4)
DIFFERENTIAL METHOD BLD: ABNORMAL
EOSINOPHIL # BLD AUTO: 0.3 K/UL (ref 0–0.5)
EOSINOPHIL NFR BLD: 3.5 % (ref 0–8)
ERYTHROCYTE [DISTWIDTH] IN BLOOD BY AUTOMATED COUNT: 15.2 % (ref 11.5–14.5)
EST. GFR  (NO RACE VARIABLE): 25.7 ML/MIN/1.73 M^2
ESTIMATED AVG GLUCOSE: 123 MG/DL (ref 68–131)
FERRITIN SERPL-MCNC: 94 NG/ML (ref 20–300)
GLUCOSE SERPL-MCNC: 129 MG/DL (ref 70–110)
HBA1C MFR BLD: 5.9 % (ref 4–5.6)
HCT VFR BLD AUTO: 35 % (ref 37–48.5)
HDLC SERPL-MCNC: 43 MG/DL (ref 40–75)
HDLC SERPL: 25.3 % (ref 20–50)
HGB BLD-MCNC: 10.8 G/DL (ref 12–16)
IMM GRANULOCYTES # BLD AUTO: 0.12 K/UL (ref 0–0.04)
IMM GRANULOCYTES NFR BLD AUTO: 1.3 % (ref 0–0.5)
IRON SERPL-MCNC: 148 UG/DL (ref 30–160)
LDLC SERPL CALC-MCNC: 105.6 MG/DL (ref 63–159)
LYMPHOCYTES # BLD AUTO: 1.5 K/UL (ref 1–4.8)
LYMPHOCYTES NFR BLD: 15.3 % (ref 18–48)
MCH RBC QN AUTO: 28.8 PG (ref 27–31)
MCHC RBC AUTO-ENTMCNC: 30.9 G/DL (ref 32–36)
MCV RBC AUTO: 93 FL (ref 82–98)
MONOCYTES # BLD AUTO: 0.6 K/UL (ref 0.3–1)
MONOCYTES NFR BLD: 6.1 % (ref 4–15)
NEUTROPHILS # BLD AUTO: 7 K/UL (ref 1.8–7.7)
NEUTROPHILS NFR BLD: 72.8 % (ref 38–73)
NONHDLC SERPL-MCNC: 127 MG/DL
NRBC BLD-RTO: 0 /100 WBC
PLATELET # BLD AUTO: 229 K/UL (ref 150–450)
PMV BLD AUTO: 13.5 FL (ref 9.2–12.9)
POTASSIUM SERPL-SCNC: 4 MMOL/L (ref 3.5–5.1)
PROT SERPL-MCNC: 7.5 G/DL (ref 6–8.4)
RBC # BLD AUTO: 3.75 M/UL (ref 4–5.4)
SATURATED IRON: 38 % (ref 20–50)
SODIUM SERPL-SCNC: 139 MMOL/L (ref 136–145)
TOTAL IRON BINDING CAPACITY: 394 UG/DL (ref 250–450)
TRANSFERRIN SERPL-MCNC: 266 MG/DL (ref 200–375)
TRIGL SERPL-MCNC: 107 MG/DL (ref 30–150)
WBC # BLD AUTO: 9.56 K/UL (ref 3.9–12.7)

## 2025-01-14 PROCEDURE — 1160F RVW MEDS BY RX/DR IN RCRD: CPT | Mod: HCNC,CPTII,S$GLB,

## 2025-01-14 PROCEDURE — 3288F FALL RISK ASSESSMENT DOCD: CPT | Mod: HCNC,CPTII,S$GLB,

## 2025-01-14 PROCEDURE — 93010 ELECTROCARDIOGRAM REPORT: CPT | Mod: HCNC,S$GLB,, | Performed by: INTERNAL MEDICINE

## 2025-01-14 PROCEDURE — 3078F DIAST BP <80 MM HG: CPT | Mod: HCNC,CPTII,S$GLB,

## 2025-01-14 PROCEDURE — 1125F AMNT PAIN NOTED PAIN PRSNT: CPT | Mod: HCNC,CPTII,S$GLB,

## 2025-01-14 PROCEDURE — 85025 COMPLETE CBC W/AUTO DIFF WBC: CPT | Mod: HCNC

## 2025-01-14 PROCEDURE — 83036 HEMOGLOBIN GLYCOSYLATED A1C: CPT | Mod: HCNC

## 2025-01-14 PROCEDURE — G2211 COMPLEX E/M VISIT ADD ON: HCPCS | Mod: HCNC,S$GLB,,

## 2025-01-14 PROCEDURE — 82728 ASSAY OF FERRITIN: CPT | Mod: HCNC

## 2025-01-14 PROCEDURE — 1101F PT FALLS ASSESS-DOCD LE1/YR: CPT | Mod: HCNC,CPTII,S$GLB,

## 2025-01-14 PROCEDURE — 1159F MED LIST DOCD IN RCRD: CPT | Mod: HCNC,CPTII,S$GLB,

## 2025-01-14 PROCEDURE — 3077F SYST BP >= 140 MM HG: CPT | Mod: HCNC,CPTII,S$GLB,

## 2025-01-14 PROCEDURE — 80061 LIPID PANEL: CPT | Mod: HCNC

## 2025-01-14 PROCEDURE — 83540 ASSAY OF IRON: CPT | Mod: HCNC

## 2025-01-14 PROCEDURE — 93005 ELECTROCARDIOGRAM TRACING: CPT | Mod: HCNC,S$GLB,,

## 2025-01-14 PROCEDURE — 99999 PR PBB SHADOW E&M-EST. PATIENT-LVL V: CPT | Mod: PBBFAC,HCNC,,

## 2025-01-14 PROCEDURE — 3008F BODY MASS INDEX DOCD: CPT | Mod: HCNC,CPTII,S$GLB,

## 2025-01-14 PROCEDURE — 80053 COMPREHEN METABOLIC PANEL: CPT | Mod: HCNC

## 2025-01-14 PROCEDURE — 99214 OFFICE O/P EST MOD 30 MIN: CPT | Mod: HCNC,S$GLB,,

## 2025-01-14 RX ORDER — HYDROXYZINE HYDROCHLORIDE 25 MG/1
25 TABLET, FILM COATED ORAL 2 TIMES DAILY PRN
Qty: 60 TABLET | Refills: 1 | Status: SHIPPED | OUTPATIENT
Start: 2025-01-14 | End: 2025-01-14 | Stop reason: ALTCHOICE

## 2025-01-14 RX ORDER — MECLIZINE HCL 12.5 MG 12.5 MG/1
25 TABLET ORAL 2 TIMES DAILY PRN
Qty: 28 TABLET | Refills: 0 | Status: SHIPPED | OUTPATIENT
Start: 2025-01-14 | End: 2025-01-14

## 2025-01-14 RX ORDER — MECLIZINE HCL 12.5 MG 12.5 MG/1
12.5 TABLET ORAL 2 TIMES DAILY PRN
Qty: 28 TABLET | Refills: 0 | Status: SHIPPED | OUTPATIENT
Start: 2025-01-14 | End: 2025-01-28

## 2025-01-14 RX ORDER — MECLIZINE HCL 12.5 MG 12.5 MG/1
12.5 TABLET ORAL 2 TIMES DAILY PRN
Qty: 28 TABLET | Refills: 0 | Status: SHIPPED | OUTPATIENT
Start: 2025-01-14 | End: 2025-01-14

## 2025-01-14 RX ORDER — TRIAMCINOLONE ACETONIDE 1 MG/G
CREAM TOPICAL 2 TIMES DAILY
Qty: 80 G | Refills: 2 | Status: SHIPPED | OUTPATIENT
Start: 2025-01-14

## 2025-01-14 RX ORDER — HYDROXYZINE HYDROCHLORIDE 25 MG/1
25 TABLET, FILM COATED ORAL 3 TIMES DAILY PRN
Qty: 60 TABLET | Refills: 1 | Status: SHIPPED | OUTPATIENT
Start: 2025-01-14 | End: 2025-01-14

## 2025-01-14 RX ORDER — QUETIAPINE FUMARATE 50 MG/1
50 TABLET, FILM COATED ORAL NIGHTLY PRN
Qty: 90 TABLET | Refills: 3 | Status: SHIPPED | OUTPATIENT
Start: 2025-01-14 | End: 2026-01-14

## 2025-01-14 NOTE — TELEPHONE ENCOUNTER
----- Message from Colleen sent at 1/14/2025 12:21 PM CST -----  Regarding: Pharmacy Call Back  Type:  Pharmacy Calling to Clarify an RX    Name of Caller: Anthony     Pharmacy Name: [facility]     Prescription Name:   Disp Refills Start End CASIE  meclizine (ANTIVERT) 12.5 mg tablet         And  Disp Refills Start End CASIE  hydrOXYzine HCL (ATARAX) 25 MG tablet         What do they need to clarify? Drug interaction     Can you be contacted via [facility]? No     Best Call Back Number: [phone]

## 2025-01-14 NOTE — PROGRESS NOTES
HPI     Lorena Contreras is a 74 y.o. female with multiple medical diagnoses as listed in the medical history and problem list that presents for dizziness, rash, and numbness to fingers. PCP Dr. Paris with last visit in this clinic on 11/26/24.     Chief Complaint   Patient presents with    Dizziness       HPI    CHIEF COMPLAINT:  Patient presents today with her sister for follow-up of multiple medical issues.    NEUROLOGICAL SYMPTOMS:  She reports tingling sensation in all five fingertips with preserved sensation and denies complete sensory loss. She experiences constant hand shaking that worsens with anxiety. She has difficulty performing craft work and frequently drops items. She experiences intermittent vertigo, described as spinning sensations, particularly when changing positions in bed and while sitting at kitchen table. These episodes are associated with nausea. She has old anti-vertigo medication from 3 years ago.    ANXIETY:  She experiences significant anxiety causing physical symptoms including vomiting. Her anxiety has previously been severe enough to cause agoraphobia. She reports traumatic memories and flashbacks of a previous accident. She notes some improvement with current medications. She takes hydroxyzine as needed for anxiety but was unaware it could be taken up to 2 times daily.    MUSCULOSKELETAL:  She has a ganglion cyst on her right hand. She has undergone three surgeries on her right foot with history of post-surgical swelling and pain. She reports current mild foot swelling with localized pain. Following one surgical procedure, she was non-weight bearing for approximately two months. She has a history of fracturing four toes on one foot and two toes on the other foot from a fall. This fall happened at the grocery store earlier in the fall of 2024.    DERMATOLOGICAL:  She reports intense back pruritus, particularly bothersome while lying in bed. She sometimes uses scented lotions.  She also notes a itchy rash on her abdomen. Current treatment includes daily CeraVe salve application by her sister without significant relief.     CARDIOVASCULAR:  She has a history of cardiac stents (one main stent and two smaller stents) and reports recent episodes of palpitations.    PAST MEDICAL HISTORY:  History significant for insulin-dependent diabetes and previous C. difficile infection requiring 8-day hospitalization.         Assessment & Plan     1. Dizziness  2. Vertigo    Reports sensation of room spinning with laying down and with certain head movements. Hasn't had this for a while, but has had similar feelings in the past. EKG in clinic showed NSR with sinus arrhythmia. Minimal voltage criteria for LVH, may be normal variant. Inferior infarct, age undetermined. Abnormal ECG. Encouraged to schedule follow up with Cardiologist Dr. Rico, as she has not seen him in a while. ED precautions given. Follow up with clinic for any worsening symptoms, or if symptoms fail to improve.       - meclizine (ANTIVERT) 12.5 mg tablet; Take 1 tablet (12.5 mg total) by mouth 2 (two) times daily as needed for Dizziness.  Dispense: 28 tablet; Refill: 0  - IN OFFICE EKG 12-LEAD (to Muse)    2. Anxiety    Somewhat stable on Fluoxetine daily. Anxious in clinic today. Discussed that she has been prescribed Atarax in the past to be used PRN for anxiety, patient did not realize this. Encouraged to use PRN. Denies chest pain, SOB. Fine bilateral hand tremor noted when anxious which resolves with rest. Will check lab work today, will refill HS Seroquel and PRN Atarax. Follow up with clinic for any worsening symptoms, or if symptoms fail to improve.       - QUEtiapine (SEROQUEL) 50 MG tablet; Take 1 tablet (50 mg total) by mouth nightly as needed (insomnia).  Dispense: 90 tablet; Refill: 3  - CBC Auto Differential; Future  - FERRITIN; Future  - IRON AND TIBC; Future  - hydrOXYzine HCL (ATARAX) 25 MG tablet; Take 1 tablet (25 mg  total) by mouth 2 (two) times daily as needed for Itching.  Dispense: 60 tablet; Refill: 1    3. Itching  5. Dermatitis    Red maculopapular rash noted to lower lumbar back and lower abdomen. No s/s of infection or drainage. Has tried hydrocortisone ointment in the past with minimal relief. Will trial triamcinolone BID, and atarax BID PRN for itching. Follow up with clinic for any worsening symptoms, or if symptoms fail to improve.       - triamcinolone acetonide 0.1% (KENALOG) 0.1 % cream; Apply topically 2 (two) times daily.  Dispense: 80 g; Refill: 2  - hydrOXYzine HCL (ATARAX) 25 MG tablet; Take 1 tablet (25 mg total) by mouth 2 (two) times daily as needed for Itching.  Dispense: 60 tablet; Refill: 1    6. Carpal tunnel syndrome on left    Positive phalen and tinel test to left hand. Small ganglion cyst noted to left radial wrist. Patient notes tingling to distal tips of all 5 fingers on left hand ever since she had her hand caught in a sliding door at the grocery. Will order EMG test to assess nerve function. Encouraged continued stretching. May use spica splint PRN for pain. Follow up with clinic for any worsening symptoms, or if symptoms fail to improve.       - EMG W/ ULTRASOUND AND NERVE CONDUCTION TEST 1 Extremity; Future    7. Routine health maintenance    - COMPREHENSIVE METABOLIC PANEL; Future  - HEMOGLOBIN A1C; Future  - LIPID PANEL; Future    8. Abnormal finding of blood chemistry, unspecified    - CBC Auto Differential; Future  - FERRITIN; Future  - IRON AND TIBC; Future  - HEMOGLOBIN A1C; Future  - LIPID PANEL; Future        Discussed DDx, condition, and treatment.   Education sent to patient portal/included in after visit summary.  ED precautions given.   Notify provider if symptoms do not resolve or increase in severity.   Patient verbalizes understanding and agrees with plan of care.  --------------------------------------------      Health Maintenance:  Health Maintenance         Date Due  Completion Date    COVID-19 Vaccine (1) Never done ---    Shingles Vaccine (1 of 2) Never done ---    RSV Vaccine (Age 60+ and Pregnant patients) (1 - Risk 60-74 years 1-dose series) Never done ---    DEXA Scan 12/08/2018 12/8/2016    Mammogram 03/26/2019 3/26/2018    Foot Exam 10/20/2023 10/20/2022    Influenza Vaccine (1) Never done ---    Hemoglobin A1c 10/10/2024 7/10/2024    Diabetic Eye Exam 02/28/2025 2/28/2024    Diabetes Urine Screening 07/10/2025 7/10/2024    Lipid Panel 07/10/2025 7/10/2024    High Dose Statin 01/14/2026 1/14/2025    TETANUS VACCINE 05/28/2031 5/28/2021    Colorectal Cancer Screening 11/04/2031 11/4/2024            Discussed the importance of overdue vaccines which were offered during this encounter. Patient declined overdue vaccines at this time and Advised patient on the importance of completing overdue health maintenance items    Follow Up:  Follow up in about 3 months (around 4/14/2025).    Exam     Review of Systems:  (as noted above)  Review of Systems   Constitutional:  Negative for activity change, appetite change, fatigue and fever.   HENT:  Negative for congestion and trouble swallowing.    Respiratory:  Negative for shortness of breath.    Cardiovascular:  Negative for chest pain.   Gastrointestinal:  Negative for blood in stool and vomiting.   Genitourinary:  Negative for hematuria.   Musculoskeletal:  Positive for arthralgias.   Skin:  Positive for rash.   Neurological:  Positive for dizziness and tremors.   Psychiatric/Behavioral:  The patient is nervous/anxious.        Physical Exam:   Physical Exam  Constitutional:       General: She is not in acute distress.     Appearance: Normal appearance.   HENT:      Head: Normocephalic and atraumatic.      Right Ear: Tympanic membrane normal.      Left Ear: Tympanic membrane normal.      Nose: Nose normal. No congestion or rhinorrhea.      Mouth/Throat:      Mouth: Mucous membranes are moist.      Pharynx: No oropharyngeal exudate  "or posterior oropharyngeal erythema.   Cardiovascular:      Rate and Rhythm: Normal rate and regular rhythm.      Pulses: Normal pulses.      Heart sounds: Normal heart sounds. No murmur heard.  Pulmonary:      Effort: Pulmonary effort is normal. No respiratory distress.      Breath sounds: Normal breath sounds. No wheezing.   Musculoskeletal:         General: Swelling (right foot/ankle at surgical site. Patient reports mild swelling there daily, to be expected.) and tenderness present.   Skin:     General: Skin is warm and dry.      Capillary Refill: Capillary refill takes less than 2 seconds.   Neurological:      General: No focal deficit present.      Mental Status: She is alert and oriented to person, place, and time.   Psychiatric:         Mood and Affect: Mood normal.         Behavior: Behavior normal.       Vitals:    01/14/25 1002   BP: (!) 160/68   BP Location: Right arm   Patient Position: Sitting   Pulse: 83   Temp: 98.1 °F (36.7 °C)   TempSrc: Oral   SpO2: 98%   Weight: 77.1 kg (169 lb 15.6 oz)   Height: 5' 8" (1.727 m)      Body mass index is 25.84 kg/m².        History     Past Medical History:  Past Medical History:   Diagnosis Date    Age-related osteoporosis with current pathological fracture with routine healing 11/13/2024    Allergy     Altered mental status 06/19/2022    DYSARTHRIA, SPASTIC MOVEMENTS & DIFFICULTY SWALLOWING    Anemia     Anxiety     Arthritis     Cataract     both removed    Colon polyps     Coronary artery disease     Depression     Diabetes mellitus, type II     Disorder of kidney and ureter     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/2023    Fibromyalgia     Follicular lymphoma     GERD (gastroesophageal reflux disease)     HTN (hypertension)     Hyperlipidemia     MI (myocardial infarction) 03/2019    Personal history of colonic polyps     Restless leg syndrome     Stroke        Past Surgical History:  Past Surgical History:   Procedure Laterality Date    APPLICATION OF WOUND " VACUUM-ASSISTED CLOSURE DEVICE Right 9/25/2024    Procedure: APPLICATION, WOUND VAC;  Surgeon: Neto Davalos MD;  Location: [facility] OR 2ND FLR;  Service: Orthopedics;  Laterality: Right;    COLONOSCOPY  11/07/2012    Colon polyp found; repeat in 5 years    COLONOSCOPY N/A 11/4/2024    Procedure: COLONOSCOPY;  Surgeon: David Castaneda MD;  Location: [facility] ENDO (2ND FLR);  Service: Endoscopy;  Laterality: N/A;    DEBRIDEMENT OF LOCAL FLAP Right 9/25/2024    Procedure: DEBRIDEMENT, LOCAL FLAP;  Surgeon: Neto Davalos MD;  Location: [facility] OR 2ND FLR;  Service: Orthopedics;  Laterality: Right;    ELBOW SURGERY Right 2015    dislocation repair     ESOPHAGOGASTRODUODENOSCOPY  11/07/2012    atrophic gastritis, H pylori testing negative    INCISION AND DRAINAGE FOOT Right 6/2/2021    Procedure: INCISION AND DRAINAGE, FOOT, bone biopsy;  Surgeon: Quiana Penn DPM;  Location: [facility] OR;  Service: Podiatry;  Laterality: Right;    IRRIGATION AND DEBRIDEMENT OF LOWER EXTREMITY Right 9/25/2024    Procedure: IRRIGATION AND DEBRIDEMENT, LOWER EXTREMITY; slider table, supine, bone foam, cysto tubing, 6L NS/dakins/peroxide, culture swabs;  Surgeon: Neto Davalos MD;  Location: [facility] OR 2ND FLR;  Service: Orthopedics;  Laterality: Right;    KNEE SURGERY Bilateral 2015    scoped    LEFT HEART CATHETERIZATION Left 3/29/2019    Procedure: Left heart cath;  Surgeon: Bladimir Barbosa MD;  Location: [facility] CATH LAB;  Service: Cardiology;  Laterality: Left;    LEFT HEART CATHETERIZATION Left 11/18/2019    Procedure: Left heart cath;  Surgeon: Karl Rico MD;  Location: [facility] CATH LAB;  Service: Cardiology;  Laterality: Left;    LEFT HEART CATHETERIZATION Left 1/8/2020    Procedure: Left heart cath, right radial, noon start;  Surgeon: Christos Monreal MD;  Location: [facility] CATH LAB;  Service: Cardiology;  Laterality: Left;  RN Pre Op 1-6-20.  To be admitted 1-7-20 sor Aspirin Disensitation    OPEN REDUCTION  AND INTERNAL FIXATION (ORIF) OF FRACTURE OF ACETABULUM Right 8/16/2024    Procedure: ORIF, FRACTURE, ACETABULUM;  Surgeon: Neto Davalos MD;  Location: [facility] OR [address] [place] [address];  Service: Orthopedics;  Laterality: Right;  anterior and lateral pelvic incisions    OPEN REDUCTION AND INTERNAL FIXATION (ORIF) OF PILON FRACTURE Right 8/14/2024    Procedure: ORIF, FRACTURE, PILON;  Surgeon: Neto Davalos MD;  Location: 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  2 dtr.  Lives with .  3 cats and a dog.  Retired.  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  Lives in house, 1 story and 4 steps up and has a ramp.      Enjoys crafting.  Unable to bowl due to myalgias.       Social Drivers of Health     Financial Resource Strain: Low Risk  (10/31/2024)    Overall Financial Resource Strain (CARDIA)     Difficulty of Paying Living Expenses: Not hard at all   Recent Concern: Financial Resource Strain - Medium Risk (10/31/2024)    Overall Financial Resource Strain (CARDIA)     Difficulty of Paying Living Expenses: Somewhat hard   Food Insecurity: No Food Insecurity (10/31/2024)    Hunger Vital Sign     Worried About Running Out of Food in the Last Year: Never true     Ran Out of Food in the Last Year: Never true   Transportation Needs: No Transportation Needs (10/31/2024)     TRANSPORTATION NEEDS     Transportation : No   Physical Activity: Inactive (10/31/2024)    Exercise Vital Sign     Days of Exercise per Week: 0 days     Minutes of Exercise per Session: 0 min   Stress: Stress Concern Present (10/31/2024)    [employer] [place] [employer] - Occupational Stress Questionnaire     Feeling of Stress : Very much   Housing Stability: Low Risk  (10/31/2024)    Housing Stability Vital Sign     Unable to Pay for Housing in the Last Year: No     Homeless in the Last Year: No       Family History:  Family History   Problem Relation Name Age of Onset    Cancer Mother          colon    Heart disease Mother      Cancer Father          lung    Lung cancer Brother      Diabetes Sister      Hypertension Sister      Allergy (severe) Daughter erin     No Known Problems Daughter      Stroke Neg Hx      Hyperlipidemia Neg Hx         Allergies and Medications: (updated and reviewed)  Review of patient's allergies indicates:   Allergen Reactions    Novolin 70/30 (semi-synthetic) Nausea And Vomiting     Severe vomiting on Relion 70/30    Sulfa (sulfonamide antibiotics) Anaphylaxis    Talwin [pentazocine lactate] Anaphylaxis    Victoza [liraglutide] Nausea And Vomiting    Glipizide Nausea Only    Codeine     Influenza virus vaccines Hives    Iodine and iodide containing products Hives    Levetiracetam Itching    Lyrica [pregabalin] Hallucinations    Neurontin [gabapentin]      Possible associated myoclonic jerk    Rituxan [rituximab] Hives    Zoloft [sertraline] Nausea And Vomiting     Current Outpatient Medications   Medication Sig Dispense Refill    albuterol (PROVENTIL/VENTOLIN HFA) 90 mcg/actuation inhaler Inhale 2 puffs into the lungs every 6 (six) hours as needed for Wheezing or Shortness of Breath. 18 g 1    aluminum & magnesium hydroxide-simethicone (MYLANTA MAX STRENGTH) 400-400-40 mg/5 mL suspension Take 30 mLs by mouth every 6 (six) hours as needed for Indigestion.      aspirin 81 MG  Chew Take 1 tablet (81 mg total) by mouth once daily. Hold to avoid bleed risk on triple therapy and then resume on oct 27 once eliquis stops on oct 26th      atorvastatin (LIPITOR) 80 MG tablet Take 1 tablet (80 mg total) by mouth every evening. 90 tablet 3    DEXCOM G7  Misc Use 1  to track blood glucose, ICD10: E11.65 1 each 0    DEXCOM G7 SENSOR Samina Use 1 sensor every 10 days to track blood glucose, ICD10: E11.65, okay with 90 day supply if possible 3 each 11    FLUoxetine 40 MG capsule Take 1 capsule (40 mg total) by mouth once daily. 90 capsule 3    furosemide (LASIX) 40 MG tablet Take 1 tablet (40 mg total) by mouth 2 (two) times daily. 60 tablet 2    hydrALAZINE (APRESOLINE) 100 MG tablet Take 1 tablet (100 mg total) by mouth every 8 (eight) hours. 135 tablet 3    insulin aspart U-100 (NOVOLOG) 100 unit/mL (3 mL) InPn pen Inject 0-10 Units into the skin before meals and at bedtime as needed (Hyperglycemia).      insulin glargine U-100, Lantus, 100 unit/mL (3 mL) SubQ InPn pen Inject 10 Units into the skin every evening. 15 mL 6    isosorbide mononitrate (IMDUR) 60 MG 24 hr tablet Take 1 tablet (60 mg total) by mouth once daily. 30 tablet 11    LORazepam (ATIVAN) 1 MG tablet Take 1 tablet (1 mg total) by mouth daily as needed for Anxiety. 30 tablet 0    metoprolol succinate (TOPROL-XL) 25 MG 24 hr tablet Take 1 tablet (25 mg total) by mouth once daily. 90 tablet 3    NOVOLOG FLEXPEN U-100 INSULIN 100 unit/mL (3 mL) InPn pen Inject 10 Units into the skin 3 (three) times daily with meals. 15 mL 8    ondansetron (ZOFRAN-ODT) 8 MG TbDL Dissolve 1 tablet (8 mg total) by mouth every 8 (eight) hours as needed (nausea). 20 tablet 2    oxyCODONE (ROXICODONE) 5 MG immediate release tablet Take 1 tablet (5 mg total) by mouth every 12 (twelve) hours as needed for Pain. 60 tablet 0    pantoprazole (PROTONIX) 40 MG tablet Take 1 tablet (40 mg total) by mouth once daily. 90 tablet 3    pen needle, diabetic  "(BD ULTRA-FINE SAGAR PEN NEEDLE) 32 gauge x 5/32" Ndle One pen needle use with insulin pen 4 times a day.  ICD-10: E11.9 150 each 11    ticagrelor (BRILINTA) 90 mg tablet Take 1 tablet (90 mg total) by mouth 2 (two) times daily. 180 tablet 3    tobramycin-dexAMETHasone 0.3-0.1% (TOBRADEX) 0.3-0.1 % DrpS Place 1 drop into the right eye 4 (four) times daily.      hydrOXYzine HCL (ATARAX) 25 MG tablet Take 1 tablet (25 mg total) by mouth 2 (two) times daily as needed for Itching. 60 tablet 1    meclizine (ANTIVERT) 12.5 mg tablet Take 1 tablet (12.5 mg total) by mouth 2 (two) times daily as needed for Dizziness. 28 tablet 0    QUEtiapine (SEROQUEL) 50 MG tablet Take 1 tablet (50 mg total) by mouth nightly as needed (insomnia). 90 tablet 3    triamcinolone acetonide 0.1% (KENALOG) 0.1 % cream Apply topically 2 (two) times daily. 80 g 2     No current facility-administered medications for this visit.       Patient Care Team:  Jorge Paris MD as PCP - General (Internal Medicine)  Javier Reilly OD as Consulting Physician (Optometry)  Aiden Zee OD as Consulting Physician (Ophthalmology)  Mary Bojorquez III, MD as Consulting Physician (Orthopedic Surgery)  [facility] - (DME Provider)  Kimberly Mckoy as ED Navigator  Stephanie Guerrero LPN as Care Coordinator         - The patient is given an After Visit Summary that lists all medications with directions, allergies, education, orders placed during this encounter and follow-up instructions.      - I have reviewed the patient's medical information including past medical, family, and social history sections including the medications and allergies.      - We discussed the patient's current medications.     This note was created by combination of typed  and MModal dictation.  Transcription errors may be present.  If there are any questions, please contact me.                 ESCOBAR Lamar    "

## 2025-01-14 NOTE — TELEPHONE ENCOUNTER
Phone call placed to pharmacy and spoke with Anthony Berg, notified that provider order was to not have hydroxyzine filled but to continue with fill of meclizine. Understanding and thank you verbalized.

## 2025-01-14 NOTE — TELEPHONE ENCOUNTER
Call returned to Anthony of [facility] pharmacy who verbalizes interaction with hydroxyzine and meclizine ordered for patient. Requests okay to fill per provider. Assured that provide would receive message and a response would be forthcoming. Thank you verbalized.

## 2025-01-15 ENCOUNTER — EXTERNAL HOME HEALTH (OUTPATIENT)
Dept: HOME HEALTH SERVICES | Facility: HOSPITAL | Age: 75
End: 2025-01-15
Payer: MEDICARE

## 2025-01-15 ENCOUNTER — TELEPHONE (OUTPATIENT)
Dept: PAIN MEDICINE | Facility: CLINIC | Age: 75
End: 2025-01-15
Payer: MEDICARE

## 2025-01-15 LAB
OHS QRS DURATION: 76 MS
OHS QTC CALCULATION: 490 MS

## 2025-01-15 NOTE — TELEPHONE ENCOUNTER
Spoke with pt's daughter. Ms. Drake stated it is difficult to transport the pt in the car and was calling to see if we could schedule an appointment later in the day. Pt's appointment was rescheduled to 1/27/25 at 2:15 pm. Confirmation was verbalized.      ----- Message from Med Assistant Arnold sent at 1/15/2025 11:06 AM CST -----  Type:  Sooner Appointment Request    Patient is requesting a sooner appointment.  Patient declined first available appointment listed as well as another facility and provider .  Patient will not accept being placed on the waitlist and is requesting a message be sent to doctor.    Name of Caller:  Daughter Noelle   When is the first available appointment?  3/7  Symptoms:  3 month f/u   Would the patient rather a call back or a response via [facility]?    Best Call Back Number:  [phone] (Mobile)  Additional Information:     Due to Tuesdays weather would like to reschedule . Also discuss refills until seen

## 2025-01-16 DIAGNOSIS — N18.4 CHRONIC KIDNEY DISEASE, STAGE 4 (SEVERE): Primary | ICD-10-CM

## 2025-01-16 NOTE — PROGRESS NOTES
Lab work reviewed and discussed with PCP. Will refer to Nephrology for worsening kidney function. Will have staff inform patient.

## 2025-01-18 DIAGNOSIS — F41.9 ANXIETY: Chronic | ICD-10-CM

## 2025-01-18 NOTE — TELEPHONE ENCOUNTER
No care due was identified.  [facility] Embedded Care Due Messages. Reference number: [id number].   1/18/2025 4:04:00 PM CST

## 2025-01-21 RX ORDER — LORAZEPAM 1 MG/1
TABLET ORAL
Qty: 30 TABLET | Refills: 0 | Status: SHIPPED | OUTPATIENT
Start: 2025-01-21

## 2025-01-22 ENCOUNTER — HOSPITAL ENCOUNTER (EMERGENCY)
Facility: HOSPITAL | Age: 75
Discharge: HOME | End: 2025-01-23
Attending: STUDENT IN AN ORGANIZED HEALTH CARE EDUCATION/TRAINING PROGRAM
Payer: MEDICARE

## 2025-01-22 DIAGNOSIS — M25.551 RIGHT HIP PAIN: ICD-10-CM

## 2025-01-22 DIAGNOSIS — R51.9 NONINTRACTABLE HEADACHE, UNSPECIFIED CHRONICITY PATTERN, UNSPECIFIED HEADACHE TYPE: ICD-10-CM

## 2025-01-22 DIAGNOSIS — M25.571 RIGHT ANKLE PAIN: ICD-10-CM

## 2025-01-22 DIAGNOSIS — R42 DIZZINESS: ICD-10-CM
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
## 2025-01-22 PROCEDURE — 99285 EMERGENCY DEPT VISIT HI MDM: CPT | Mod: 25,HCNC

## 2025-01-23 VITALS
HEART RATE: 83 BPM | TEMPERATURE: 98 F | WEIGHT: 169 LBS | OXYGEN SATURATION: 100 % | SYSTOLIC BLOOD PRESSURE: 164 MMHG | DIASTOLIC BLOOD PRESSURE: 73 MMHG | BODY MASS INDEX: 25.61 KG/M2 | RESPIRATION RATE: 18 BRPM | HEIGHT: 68 IN

## 2025-01-23 LAB — POCT GLUCOSE: 171 MG/DL (ref 70–110)

## 2025-01-23 PROCEDURE — 25000003 PHARM REV CODE 250: Mod: HCNC | Performed by: STUDENT IN AN ORGANIZED HEALTH CARE EDUCATION/TRAINING PROGRAM

## 2025-01-23 PROCEDURE — 82962 GLUCOSE BLOOD TEST: CPT | Mod: HCNC

## 2025-01-23 RX ORDER — DIAZEPAM 2 MG/1
2 TABLET ORAL
Status: COMPLETED | OUTPATIENT
Start: 2025-01-23 | End: 2025-01-23

## 2025-01-23 RX ORDER — ACETAMINOPHEN 325 MG/1
650 TABLET ORAL
Status: COMPLETED | OUTPATIENT
Start: 2025-01-23 | End: 2025-01-23

## 2025-01-23 RX ADMIN — DIAZEPAM 2 MG: 2 TABLET ORAL at 02:01

## 2025-01-23 RX ADMIN — ACETAMINOPHEN 650 MG: 325 TABLET ORAL at 12:01

## 2025-01-23 NOTE — DISCHARGE INSTRUCTIONS

## 2025-01-23 NOTE — ED PROVIDER NOTES
Encounter Date: 1/22/2025       History     Chief Complaint   Patient presents with    Fall    Dizziness     Pt arrived via EMS after falling at home. She was attempting to change clothes when she lost her balance and fell. Pt reports dizziness x2 days, with a recent dx of vertigo. Has not taken meds yet. Pt reports head, neck and back pain after fall. No LoC, takes a blood thinner but is unsure which one.     74-year-old female with history of limited mobility due to right hip fracture and right ankle fracture status post surgeries 4 months ago, currently uses a wheelchair, had a mechanical fall today.  She awoke in the middle of the night to use the restroom, got to her wheelchair, got to the bathroom, urinated normally but found some urine on her nightgown, so went back to bed, attempted to change her night gown and fell.  She hit the right side of her head and the back of her neck.  She did not lose consciousness.  She now has headache, neck pain and right ankle pain.  She denies other symptoms at this time.      Review of patient's allergies indicates:   Allergen Reactions    Novolin 70/30 (semi-synthetic) Nausea And Vomiting     Severe vomiting on Relion 70/30    Sulfa (sulfonamide antibiotics) Anaphylaxis    Talwin [pentazocine lactate] Anaphylaxis    Victoza [liraglutide] Nausea And Vomiting    Glipizide Nausea Only    Codeine     Influenza virus vaccines Hives    Iodine and iodide containing products Hives    Levetiracetam Itching    Lyrica [pregabalin] Hallucinations    Neurontin [gabapentin]      Possible associated myoclonic jerk    Rituxan [rituximab] Hives    Zoloft [sertraline] Nausea And Vomiting     Past Medical History:   Diagnosis Date    Age-related osteoporosis with current pathological fracture with routine healing 11/13/2024    Allergy     Altered mental status 06/19/2022    DYSARTHRIA, SPASTIC MOVEMENTS & DIFFICULTY SWALLOWING    Anemia     Anxiety     Arthritis     Cataract     both removed     Colon polyps     Coronary artery disease     Depression     Diabetes mellitus, type II     Disorder of kidney and ureter     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/2023    Fibromyalgia     Follicular lymphoma     GERD (gastroesophageal reflux disease)     HTN (hypertension)     Hyperlipidemia     MI (myocardial infarction) 03/2019    Personal history of colonic polyps     Restless leg syndrome     Stroke      Past Surgical History:   Procedure Laterality Date    APPLICATION OF WOUND VACUUM-ASSISTED CLOSURE DEVICE Right 9/25/2024    Procedure: APPLICATION, WOUND VAC;  Surgeon: Neto Davalos MD;  Location: [facility] OR [address] [place] [address];  Service: Orthopedics;  Laterality: Right;    COLONOSCOPY  11/07/2012    Colon polyp found; repeat in 5 years    COLONOSCOPY N/A 11/4/2024    Procedure: COLONOSCOPY;  Surgeon: David Castaneda MD;  Location: [facility] ([address] [place] [address]);  Service: Endoscopy;  Laterality: N/A;    DEBRIDEMENT OF LOCAL FLAP Right 9/25/2024    Procedure: DEBRIDEMENT, LOCAL FLAP;  Surgeon: Neto Davalos MD;  Location: [address];  Service: Orthopedics;  Laterality: Right;    ELBOW SURGERY Right 2015    dislocation repair     ESOPHAGOGASTRODUODENOSCOPY  11/07/2012    atrophic gastritis, H pylori testing negative    INCISION AND DRAINAGE FOOT Right 6/2/2021    Procedure: INCISION AND DRAINAGE, FOOT, bone biopsy;  Surgeon: Quiana Penn DPM;  Location: [facility] OR;  Service: Podiatry;  Laterality: Right;    IRRIGATION AND DEBRIDEMENT OF LOWER EXTREMITY Right 9/25/2024    Procedure: IRRIGATION AND DEBRIDEMENT, LOWER EXTREMITY; slider table, supine, bone foam, cysto tubing, 6L NS/dakins/peroxide, culture swabs;  Surgeon: Neto Davalos MD;  Location: [facility] OR [address] [place] [address];  Service: Orthopedics;  Laterality: Right;    KNEE SURGERY Bilateral 2015    scoped    LEFT HEART CATHETERIZATION Left 3/29/2019    Procedure: Left heart cath;  Surgeon: Bladimir Barbosa MD;  Location: [facility] CATH LAB;  Service:  Cardiology;  Laterality: Left;    LEFT HEART CATHETERIZATION Left 11/18/2019    Procedure: Left heart cath;  Surgeon: Karl Rico MD;  Location: [facility] CATH LAB;  Service: Cardiology;  Laterality: Left;    LEFT HEART CATHETERIZATION Left 1/8/2020    Procedure: Left heart cath, right radial, noon start;  Surgeon: Christos Monrela MD;  Location: [facility] CATH LAB;  Service: Cardiology;  Laterality: Left;  RN Pre Op 1-6-20.  To be admitted 1-7-20 sor Aspirin Disensitation    OPEN REDUCTION AND INTERNAL FIXATION (ORIF) OF FRACTURE OF ACETABULUM Right 8/16/2024    Procedure: ORIF, FRACTURE, ACETABULUM;  Surgeon: Neto Davalos MD;  Location: [facility] OR [address] [place] [address];  Service: Orthopedics;  Laterality: Right;  anterior and lateral pelvic incisions    OPEN REDUCTION AND INTERNAL FIXATION (ORIF) OF PILON FRACTURE Right 8/14/2024    Procedure: ORIF, FRACTURE, PILON;  Surgeon: Neto Davalos MD;  [facility];  Service: Orthopedics;  Laterality: Right;    TONSILLECTOMY  1955    ULTRASOUND GUIDANCE  1/8/2020    Procedure: Ultrasound Guidance;  Surgeon: Christos Monreal MD;  Location: [facility] CATH LAB;  Service: Cardiology;;     Family History   Problem Relation Name Age of Onset    Cancer Mother          colon    Heart disease Mother      Cancer Father          lung    Lung cancer Brother      Diabetes Sister      Hypertension Sister      Allergy (severe) Daughter erin     No Known Problems Daughter      Stroke Neg Hx      Hyperlipidemia Neg Hx       Social History     Tobacco Use    Smoking status: Never    Smokeless tobacco: Never   Substance Use Topics    Alcohol use: Not Currently    Drug use: Never     Review of Systems   Musculoskeletal:  Positive for arthralgias and neck pain.   Neurological:  Positive for headaches.       Physical Exam     Initial Vitals [01/22/25 2301]   BP Pulse Resp Temp SpO2   (!) 183/81 85 18 97.6 °F (36.4 °C) 97 %      MAP       --         Physical Exam    Nursing note and  vitals reviewed.  Constitutional: She appears well-developed and well-nourished. She is not diaphoretic.   HENT:   Head: Normocephalic and atraumatic. Mouth/Throat: Oropharynx is clear and moist.   Eyes: EOM are normal. Pupils are equal, round, and reactive to light. Right eye exhibits no discharge. Left eye exhibits no discharge.   Neck: No tracheal deviation present.   Normal range of motion.  Cardiovascular:  Normal rate, regular rhythm and intact distal pulses.           Pulmonary/Chest: No respiratory distress. She has no wheezes. She exhibits no tenderness.   Abdominal: Abdomen is soft. She exhibits no distension. There is no abdominal tenderness.   Musculoskeletal:         General: No tenderness or edema. Normal range of motion.      Cervical back: Normal range of motion.     Neurological: She is alert and oriented to person, place, and time. She has normal strength. No cranial nerve deficit or sensory deficit. GCS eye subscore is 4. GCS verbal subscore is 5. GCS motor subscore is 6.   Skin: Skin is warm and dry. No rash noted.   Psychiatric: She has a normal mood and affect. Her behavior is normal. Thought content normal.         ED Course   Procedures  Labs Reviewed   POCT GLUCOSE - Abnormal       Result Value    POCT Glucose 171 (*)    URINALYSIS, REFLEX TO URINE CULTURE   POCT GLUCOSE MONITORING CONTINUOUS          Imaging Results              X-Ray Hip 2 or 3 views Right with Pelvis when performed (Final result)  Result time 01/23/25 02:38:40      Final result by Malvin Adorno MD (01/23/25 02:38:40)                   Impression:      As above.      Electronically signed by: Malvin Adorno MD  Date:    01/23/2025  Time:    02:38               Narrative:    EXAMINATION:  XR HIP WITH PELVIS WHEN PERFORMED 2 OR 3 VIEWS RIGHT    CLINICAL HISTORY:  Pain in right hip    TECHNIQUE:  AP pelvis and frog leg lateral view of the right hip were performed.    COMPARISON:  08/13/2024.    FINDINGS:  Post ORIF  changes right hemipelvis for prior fracture.  No acute fracture or dislocation identified at the right hip.  Degenerative changes appears similar to prior.  Vascular calcifications present.                                       X-Ray Ankle Complete Right (Final result)  Result time 01/23/25 02:21:38      Final result by Malvin Adorno MD (01/23/25 02:21:38)                   Impression:      No acute fracture.    Postop ORIF changes distal right fibula and tibia, similar compared to prior.      Electronically signed by: Malvin Adorno MD  Date:    01/23/2025  Time:    02:21               Narrative:    EXAMINATION:  XR ANKLE COMPLETE 3 VIEW RIGHT    CLINICAL HISTORY:  Pain in right ankle and joints of right foot    TECHNIQUE:  AP, lateral, and oblique images of the right ankle were performed.    COMPARISON:  12/27/2024.    FINDINGS:  Postop changes ORIF distal fibula and tibia, similar compared to prior.  Hardware appears intact.  No acute fracture or dislocation.  Ankle mortise appears unchanged.  Prominent calcaneal plantar spur, similar to prior.  Vascular calcifications appears similar to prior.                                       X-Ray Chest 1 View (Final result)  Result time 01/23/25 00:33:08      Final result by Leatha Palomo MD (01/23/25 00:33:08)                   Impression:      No acute cardiopulmonary process identified.      Electronically signed by: Leatha Palomo MD  Date:    01/23/2025  Time:    00:33               Narrative:    EXAMINATION:  XR CHEST 1 VIEW    CLINICAL HISTORY:  Dizziness and giddiness    TECHNIQUE:  Single frontal view of the chest was performed.    COMPARISON:  September 2024.    FINDINGS:  Cardiac silhouette is normal in size.  Lungs are expanded with elevation of the right hemidiaphragm seen.  Mild chronic coarse interstitial lung changes are seen.  No evidence of focal consolidative process, pneumothorax, or significant pleural effusion.  No acute osseous  abnormality identified.                                       CT Head Without Contrast (Final result)  Result time 01/23/25 00:28:45      Final result by Leatha Palomo MD (01/23/25 00:28:45)                   Impression:      No acute intracranial abnormalities identified.      Electronically signed by: Leatha Palomo MD  Date:    01/23/2025  Time:    00:28               Narrative:    EXAMINATION:  CT HEAD WITHOUT CONTRAST    CLINICAL HISTORY:  Dizziness, persistent/recurrent, cardiac or vascular cause suspected;    TECHNIQUE:  Low dose axial images were obtained through the head.  Coronal and sagittal reformations were also performed. Contrast was not administered.    COMPARISON:  CT head and MRI brain from April 2023.    FINDINGS:  There is generalized cerebral volume loss and mild chronic microvascular ischemic change.  No evidence of acute/recent major vascular distribution cerebral infarction, intraparenchymal hemorrhage, or intra-axial space occupying lesion. The ventricular system is stable in size and configuration with cavum septum pellucidum noted.  No effacement of the skull-base cisterns. No abnormal extra-axial fluid collections or blood products. Visualized paranasal sinuses and mastoid air cells are clear. The calvarium shows no significant abnormality.  Soft tissue swelling and hematoma are seen involving the right occipital scalp region.                                       CT Cervical Spine Without Contrast (Final result)  Result time 01/23/25 00:32:19      Final result by Leatha Palomo MD (01/23/25 00:32:19)                   Impression:      No evidence of acute cervical spine fracture or dislocation.      Electronically signed by: Leatha Palomo MD  Date:    01/23/2025  Time:    00:32               Narrative:    EXAMINATION:  CT CERVICAL SPINE WITHOUT CONTRAST    CLINICAL HISTORY:  Neck pain, acute, no red flags;    TECHNIQUE:  Low dose axial images, sagittal and coronal reformations  were performed though the cervical spine.  Contrast was not administered.    COMPARISON:  None    FINDINGS:  No evidence of acute cervical spine fracture or dislocation.  Odontoid process is intact.  Craniocervical junction is unremarkable.  Cervical spine alignment is within normal limits.  Intervertebral disc space narrowing and degenerative endplate change are seen most pronounced at the C5-6 level.  There is multilevel moderate left-sided facet arthropathy.  Facet fusion is visualized on the right at C2-3.  Calcification of the transverse ligament is noted.    Surrounding soft tissues show no significant abnormalities.  Airway is patent.  Partially visualized lung apices are clear.                                       Medications   acetaminophen tablet 650 mg (650 mg Oral Given 1/23/25 0053)   diazePAM tablet 2 mg (2 mg Oral Given 1/23/25 0254)     Medical Decision Making  74-year-old female with right hip and ankle surgeries causing limited mobility presents now for headache and neck pain after mechanical fall.  Vitals within normal limits.  GCS 15.  CT head and C-spine without evidence of acute intracranial bleed or neck fracture.  Right hip x-ray without evidence of fracture.  Right ankle x-ray without evidence of fracture.  Patient is stable discharge with outpatient follow up and return precautions for worsening symptoms    Amount and/or Complexity of Data Reviewed  Radiology: ordered.    Risk  OTC drugs.  Prescription drug management.                                      Clinical Impression:  Final diagnoses:  [R42] Dizziness  [W19.XXXA] Fall, initial encounter (Primary)  [R51.9] Nonintractable headache, unspecified chronicity pattern, unspecified headache type  [M25.551] Right hip pain  [M25.571] Right ankle pain          ED Disposition Condition    Discharge Stable          ED Prescriptions    None       Follow-up Information       Follow up With Specialties Details Why Contact Jorge Jung  MD [facility], Pediatrics Call in 1 day To recheck today's symptoms [address] [place] [address]  [phone]               Kashif Cunningham MD  01/23/25 7211

## 2025-01-23 NOTE — ED TRIAGE NOTES
Pt presents to ED  via EMS d/t fall. Pt reports she fell while trying to change her night gown causing her to hit the R side of her head and back of her neck. Pt denies LOC but reports severe HA, neck, R ankle, and R hip pain. Pt reports she recently had an injury to her R hip and R ankle x4 months, so she uses a wheelchair to get around.  Pt is AAOx4.

## 2025-01-25 ENCOUNTER — TELEPHONE (OUTPATIENT)
Dept: FAMILY MEDICINE | Facility: CLINIC | Age: 75
End: 2025-01-25
Payer: MEDICARE

## 2025-01-25 NOTE — TELEPHONE ENCOUNTER
----- Message from ESCOBAR Lamar sent at 1/16/2025  3:45 PM CST -----  Please also schedule her for a 2 week nurse BP check. Thank you!!

## 2025-01-27 ENCOUNTER — OFFICE VISIT (OUTPATIENT)
Dept: PAIN MEDICINE | Facility: CLINIC | Age: 75
End: 2025-01-27
Payer: MEDICARE

## 2025-01-27 VITALS
HEART RATE: 81 BPM | SYSTOLIC BLOOD PRESSURE: 179 MMHG | OXYGEN SATURATION: 100 % | DIASTOLIC BLOOD PRESSURE: 76 MMHG | RESPIRATION RATE: 18 BRPM

## 2025-01-27 DIAGNOSIS — S82.874D CLOSED NONDISPLACED PILON FRACTURE OF RIGHT TIBIA WITH ROUTINE HEALING, SUBSEQUENT ENCOUNTER: ICD-10-CM

## 2025-01-27 DIAGNOSIS — S32.401D CLOSED DISPLACED FRACTURE OF RIGHT ACETABULUM WITH ROUTINE HEALING, UNSPECIFIED PORTION OF ACETABULUM, SUBSEQUENT ENCOUNTER: ICD-10-CM

## 2025-01-27 DIAGNOSIS — S32.511D CLOSED FRACTURE OF SUPERIOR RAMUS OF RIGHT PUBIS WITH ROUTINE HEALING, SUBSEQUENT ENCOUNTER: ICD-10-CM

## 2025-01-27 DIAGNOSIS — R26.89 IMPAIRED GAIT AND MOBILITY: Primary | ICD-10-CM

## 2025-01-27 PROCEDURE — 3044F HG A1C LEVEL LT 7.0%: CPT | Mod: HCNC,CPTII,S$GLB, | Performed by: PAIN MEDICINE

## 2025-01-27 PROCEDURE — 1159F MED LIST DOCD IN RCRD: CPT | Mod: HCNC,CPTII,S$GLB, | Performed by: PAIN MEDICINE

## 2025-01-27 PROCEDURE — 3077F SYST BP >= 140 MM HG: CPT | Mod: HCNC,CPTII,S$GLB, | Performed by: PAIN MEDICINE

## 2025-01-27 PROCEDURE — 99999 PR PBB SHADOW E&M-EST. PATIENT-LVL V: CPT | Mod: PBBFAC,HCNC,, | Performed by: PAIN MEDICINE

## 2025-01-27 PROCEDURE — 3078F DIAST BP <80 MM HG: CPT | Mod: HCNC,CPTII,S$GLB, | Performed by: PAIN MEDICINE

## 2025-01-27 PROCEDURE — 3288F FALL RISK ASSESSMENT DOCD: CPT | Mod: HCNC,CPTII,S$GLB, | Performed by: PAIN MEDICINE

## 2025-01-27 PROCEDURE — 1160F RVW MEDS BY RX/DR IN RCRD: CPT | Mod: HCNC,CPTII,S$GLB, | Performed by: PAIN MEDICINE

## 2025-01-27 PROCEDURE — 99214 OFFICE O/P EST MOD 30 MIN: CPT | Mod: HCNC,S$GLB,, | Performed by: PAIN MEDICINE

## 2025-01-27 PROCEDURE — 1125F AMNT PAIN NOTED PAIN PRSNT: CPT | Mod: HCNC,CPTII,S$GLB, | Performed by: PAIN MEDICINE

## 2025-01-27 PROCEDURE — 1100F PTFALLS ASSESS-DOCD GE2>/YR: CPT | Mod: HCNC,CPTII,S$GLB, | Performed by: PAIN MEDICINE

## 2025-01-27 NOTE — PROGRESS NOTES
Subjective:     Patient ID: Lorena Contreras is a 74 y.o. female    Chief Complaint: Follow-up (3wk F\U )      Referred by: No ref. provider found      HPI:    Interval History (1/27/25):  She returns today for follow up.  She reports that she continues to have chronic right ankle and foot pain as before.  Also states she has fallen recently and has various other pains related to physical trauma.  No overt fractures noted.  Patient has been taking oxycodone 5 mg half tablet 4 times a day.  This has been providing adequate relief without any adverse effects.  She did complete home health physical therapy and was ambulatory, but due to recent falls, she is very anxious to begin walking again.  She does endorse having significant anxiety regarding multiple issues.  She has significant anxiety about leaving the house, significant anxiety about walking as well as significant anxiety related to her pain.       Initial Encounter (12/30/24):  Lorena Contreras is a 74 y.o. female who presents today with chronic right ankle/foot pain.  Significant orthopedic trauma to this area and has undergone multiple surgeries.  Subsequent wound dehiscence and additional surgery required.  Wounds have now completely healed.  Patient is initiating home health therapy to reintroduce ADLs and mobility.  She has been taking oxycodone 5 mg b.i.d. p.r.n..  This has provided adequate relief without any adverse effects.  She does require a neuropathic component of her pain, but has failed gabapentin and Lyrica due to side effects.  She is unable to take NSAIDs and Tylenol due to other medical conditions.   This pain is described in detail below.    Physical Therapy:  Yes.  Completed home health therapy.    Non-pharmacologic Treatment:  Rest helps         TENS?  No    Pain Medications:         Currently taking:  Oxycodone 5 mg    Has tried in the past:  Lyrica, gabapentin    Has not tried:  NSAIDs, Tylenol, Muscle relaxants, TCAs, SNRIs,  topical creams    Blood thinners:  Aspirin 81 mg    Interventional Therapies:  None    Relevant Surgeries:   Multiple right lower extremity orthopedic surgeries    Affecting sleep?  Yes    Affecting daily activities? yes    Depressive symptoms? No          SI/HI? No    Work status: Retired    Pain Scores:    Best:       3/10  Worst:     7/10  Usually:   5/10  Today:    7/10    Pain Disability Index  Family/Home Responsibilities:: 10  Recreation:: 9  Social Activity:: 9  Occupation:: 10  Sexual Behavior:: 0  Self Care:: 7  Life-Support Activities:: 3  Pain Disability Index (PDI): 48    Review of Systems   Constitutional:  Negative for activity change, appetite change, chills, fatigue, fever and unexpected weight change.   HENT:  Negative for hearing loss.    Eyes:  Negative for visual disturbance.   Respiratory:  Negative for chest tightness and shortness of breath.    Cardiovascular:  Negative for chest pain.   Gastrointestinal:  Negative for abdominal pain, constipation, diarrhea, nausea and vomiting.   Genitourinary:  Negative for difficulty urinating.   Musculoskeletal:  Positive for arthralgias, gait problem and myalgias. Negative for back pain and neck pain.   Skin:  Negative for rash.   Neurological:  Positive for weakness. Negative for dizziness, light-headedness, numbness and headaches.   Psychiatric/Behavioral:  Positive for sleep disturbance. Negative for hallucinations and suicidal ideas. The patient is not nervous/anxious.        Past Medical History:   Diagnosis Date    Age-related osteoporosis with current pathological fracture with routine healing 11/13/2024    Allergy     Altered mental status 06/19/2022    DYSARTHRIA, SPASTIC MOVEMENTS & DIFFICULTY SWALLOWING    Anemia     Anxiety     Arthritis     Cataract     both removed    Colon polyps     Coronary artery disease     Depression     Diabetes mellitus, type II     Disorder of kidney and ureter     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/2023     Fibromyalgia     Follicular lymphoma     GERD (gastroesophageal reflux disease)     HTN (hypertension)     Hyperlipidemia     MI (myocardial infarction) 03/2019    Personal history of colonic polyps     Restless leg syndrome     Stroke        Past Surgical History:   Procedure Laterality Date    APPLICATION OF WOUND VACUUM-ASSISTED CLOSURE DEVICE Right 9/25/2024    Procedure: APPLICATION, WOUND VAC;  Surgeon: Neto Davalos MD;  [facility];  Service: Orthopedics;  Laterality: Right;    COLONOSCOPY  11/07/2012    Colon polyp found; repeat in 5 years    COLONOSCOPY N/A 11/4/2024    Procedure: COLONOSCOPY;  Surgeon: David Castaneda MD;  Location: [facility] ENDO (2ND FLR);  Service: Endoscopy;  Laterality: N/A;    DEBRIDEMENT OF LOCAL FLAP Right 9/25/2024    Procedure: DEBRIDEMENT, LOCAL FLAP;  Surgeon: Neto Davalos MD;  [facility];  Service: Orthopedics;  Laterality: Right;    ELBOW SURGERY Right 2015    dislocation repair     ESOPHAGOGASTRODUODENOSCOPY  11/07/2012    atrophic gastritis, H pylori testing negative    INCISION AND DRAINAGE FOOT Right 6/2/2021    Procedure: INCISION AND DRAINAGE, FOOT, bone biopsy;  Surgeon: Quiana Penn DPM;  Location: [facility] OR;  Service: Podiatry;  Laterality: Right;    IRRIGATION AND DEBRIDEMENT OF LOWER EXTREMITY Right 9/25/2024    Procedure: IRRIGATION AND DEBRIDEMENT, LOWER EXTREMITY; slider table, supine, bone foam, cysto tubing, 6L NS/dakins/peroxide, culture swabs;  Surgeon: Neto Davalos MD;  [address]  Service: Orthopedics;  Laterality: Right;    KNEE SURGERY Bilateral 2015    scoped    LEFT HEART CATHETERIZATION Left 3/29/2019    Procedure: Left heart cath;  Surgeon: Bladimir Barbosa MD;  Location: [facility] CATH LAB;  Service: Cardiology;  Laterality: Left;    LEFT HEART CATHETERIZATION Left 11/18/2019    Procedure: Left heart cath;  Surgeon: Karl Rico MD;  Location: [facility] CATH LAB;  Service:  Cardiology;  Laterality: Left;    LEFT HEART CATHETERIZATION Left 1/8/2020    Procedure: Left heart cath, right radial, noon start;  Surgeon: Christos Monreal MD;  Location: [facility] CATH LAB;  Service: Cardiology;  Laterality: Left;  RN Pre Op 1-6-20.  To be admitted 1-7-20 sor Aspirin Disensitation    OPEN REDUCTION AND INTERNAL FIXATION (ORIF) OF FRACTURE OF ACETABULUM Right 8/16/2024    Procedure: ORIF, FRACTURE, ACETABULUM;  Surgeon: Neto Davalos MD;  Location: [facility] OR [address] [place] [address];  Service: Orthopedics;  Laterality: Right;  anterior and lateral pelvic incisions    OPEN REDUCTION AND INTERNAL FIXATION (ORIF) OF PILON FRACTURE Right 8/14/2024    Procedure: ORIF, FRACTURE, PILON;  Surgeon: Neto Davalos MD;  Location: [facility] OR [address] [place] [address];  Service: Orthopedics;  Laterality: Right;    TONSILLECTOMY  1955    ULTRASOUND GUIDANCE  1/8/2020    Procedure: Ultrasound Guidance;  Surgeon: Christos Monreal MD;  Location: [facility] CATH LAB;  Service: Cardiology;;       Social History     Socioeconomic History    Marital status:     Number of children: 2   Occupational History    Occupation: house wife    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  2 dtr.  Lives with .  3 cats and a dog.  Retired.  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  Lives in house, 1 story and 4 steps up and has a ramp.      Enjoys crafting.  Unable to bowl due to myalgias.       Social Drivers of Health     Financial Resource Strain: Low Risk  (10/31/2024)    Overall Financial Resource Strain (CARDIA)     Difficulty of Paying Living Expenses: Not hard at all   Recent Concern: Financial Resource Strain - Medium Risk (10/31/2024)    Overall Financial Resource Strain (CARDIA)     Difficulty of  Paying Living Expenses: Somewhat hard   Food Insecurity: No Food Insecurity (10/31/2024)    Hunger Vital Sign     Worried About Running Out of Food in the Last Year: Never true     Ran Out of Food in the Last Year: Never true   Transportation Needs: No Transportation Needs (10/31/2024)    TRANSPORTATION NEEDS     Transportation : No   Physical Activity: Inactive (10/31/2024)    Exercise Vital Sign     Days of Exercise per Week: 0 days     Minutes of Exercise per Session: 0 min   Stress: Stress Concern Present (10/31/2024)    [employer] [place] [employer] - Occupational Stress Questionnaire     Feeling of Stress : Very much   Housing Stability: Low Risk  (10/31/2024)    Housing Stability Vital Sign     Unable to Pay for Housing in the Last Year: No     Homeless in the Last Year: No       Review of patient's allergies indicates:   Allergen Reactions    Novolin 70/30 (semi-synthetic) Nausea And Vomiting     Severe vomiting on Relion 70/30    Sulfa (sulfonamide antibiotics) Anaphylaxis    Talwin [pentazocine lactate] Anaphylaxis    Victoza [liraglutide] Nausea And Vomiting    Glipizide Nausea Only    Codeine     Influenza virus vaccines Hives    Iodine and iodide containing products Hives    Levetiracetam Itching    Lyrica [pregabalin] Hallucinations    Neurontin [gabapentin]      Possible associated myoclonic jerk    Rituxan [rituximab] Hives    Zoloft [sertraline] Nausea And Vomiting       Current Outpatient Medications on File Prior to Visit   Medication Sig Dispense Refill    albuterol (PROVENTIL/VENTOLIN HFA) 90 mcg/actuation inhaler Inhale 2 puffs into the lungs every 6 (six) hours as needed for Wheezing or Shortness of Breath. 18 g 1    aluminum & magnesium hydroxide-simethicone (MYLANTA MAX STRENGTH) 400-400-40 mg/5 mL suspension Take 30 mLs by mouth every 6 (six) hours as needed for Indigestion.      aspirin 81 MG Chew Take 1 tablet (81 mg total) by mouth once daily. Hold to avoid bleed risk on  triple therapy and then resume on oct 27 once eliquis stops on oct 26th      atorvastatin (LIPITOR) 80 MG tablet Take 1 tablet (80 mg total) by mouth every evening. 90 tablet 3    DEXCOM G7  Misc Use 1  to track blood glucose, ICD10: E11.65 1 each 0    DEXCOM G7 SENSOR Samina Use 1 sensor every 10 days to track blood glucose, ICD10: E11.65, okay with 90 day supply if possible 3 each 11    FLUoxetine 40 MG capsule Take 1 capsule (40 mg total) by mouth once daily. 90 capsule 3    furosemide (LASIX) 40 MG tablet Take 1 tablet (40 mg total) by mouth 2 (two) times daily. 60 tablet 2    hydrALAZINE (APRESOLINE) 100 MG tablet Take 1 tablet (100 mg total) by mouth every 8 (eight) hours. 135 tablet 3    insulin aspart U-100 (NOVOLOG) 100 unit/mL (3 mL) InPn pen Inject 0-10 Units into the skin before meals and at bedtime as needed (Hyperglycemia).      insulin glargine U-100, Lantus, 100 unit/mL (3 mL) SubQ InPn pen Inject 10 Units into the skin every evening. 15 mL 6    isosorbide mononitrate (IMDUR) 60 MG 24 hr tablet Take 1 tablet (60 mg total) by mouth once daily. 30 tablet 11    LORazepam (ATIVAN) 1 MG tablet TAKE 1 TABLET BY MOUTH ONCE DAILY AS NEEDED FOR ANXIETY 30 tablet 0    meclizine (ANTIVERT) 12.5 mg tablet Take 1 tablet (12.5 mg total) by mouth 2 (two) times daily as needed for Dizziness. 28 tablet 0    metoprolol succinate (TOPROL-XL) 25 MG 24 hr tablet Take 1 tablet (25 mg total) by mouth once daily. 90 tablet 3    NOVOLOG FLEXPEN U-100 INSULIN 100 unit/mL (3 mL) InPn pen Inject 10 Units into the skin 3 (three) times daily with meals. 15 mL 8    ondansetron (ZOFRAN-ODT) 8 MG TbDL Dissolve 1 tablet (8 mg total) by mouth every 8 (eight) hours as needed (nausea). 20 tablet 2    oxyCODONE (ROXICODONE) 5 MG immediate release tablet Take 1 tablet (5 mg total) by mouth every 12 (twelve) hours as needed for Pain. 60 tablet 0    pantoprazole (PROTONIX) 40 MG tablet Take 1 tablet (40 mg total) by mouth once  "daily. 90 tablet 3    pen needle, diabetic (BD ULTRA-FINE SAGAR PEN NEEDLE) 32 gauge x 5/32" Ndle One pen needle use with insulin pen 4 times a day.  ICD-10: E11.9 150 each 11    QUEtiapine (SEROQUEL) 50 MG tablet Take 1 tablet (50 mg total) by mouth nightly as needed (insomnia). 90 tablet 3    ticagrelor (BRILINTA) 90 mg tablet Take 1 tablet (90 mg total) by mouth 2 (two) times daily. 180 tablet 3    tobramycin-dexAMETHasone 0.3-0.1% (TOBRADEX) 0.3-0.1 % DrpS Place 1 drop into the right eye 4 (four) times daily.      triamcinolone acetonide 0.1% (KENALOG) 0.1 % cream Apply topically 2 (two) times daily. 80 g 2     No current facility-administered medications on file prior to visit.       Objective:      BP (!) 179/76 (BP Location: Left arm, Patient Position: Sitting)   Pulse 81   Resp 18   SpO2 100%     Exam:  GEN:  Well developed, well nourished.  No acute distress.   HEENT:  No trauma.  Mucous membranes moist.  Nares patent bilaterally.  PSYCH: Normal affect. Thought content appropriate.  CHEST:  Breathing symmetric.  No audible wheezing.  ABD: Soft, non-distended.  SKIN:  Warm, pink, dry.  No rash on exposed areas.    EXT:  No cyanosis, clubbing, or edema.  No color change or changes in nail or hair growth.  NEURO/MUSCULOSKELETAL:  Fully alert, oriented, and appropriate. Speech normal janak. No cranial nerve deficits.   Gait:  In manual wheelchair.  No focal motor deficits.         Imaging:    Narrative & Impression    EXAMINATION:  XR ANKLE COMPLETE 3 VIEW RIGHT     CLINICAL HISTORY:  Nondisplaced pilon fracture of right tibia, subsequent encounter for closed fracture with routine healing     TECHNIQUE:  AP, lateral, and oblique images of the right ankle were performed.     COMPARISON:  October 9, 2024     FINDINGS:  Again, there are surgical changes fixating the distal fibula, medial malleolus and posterior malleolus fractures.  Hardware is intact.  Alignment is satisfactory.  Degenerative changes are " noted at the tibiotalar joint.  There is osseous demineralization.  Plantar calcaneal spurring present.  Vascular calcification within the soft tissue.     Impression:     As above        Electronically signed by:Liv Mazariegos MD  Date:                                            11/12/2024  Time:                                           11:10       Assessment:       Encounter Diagnoses   Name Primary?    Closed nondisplaced pilon fracture of right tibia with routine healing, subsequent encounter s/p ORIF 8/17/24     Closed displaced fracture of right acetabulum with routine healing, unspecified portion of acetabulum, subsequent encounter     Closed fracture of superior ramus of right pubis with routine healing, subsequent encounter     Impaired gait and mobility Yes     Plan:       Lorena was seen today for follow-up.    Diagnoses and all orders for this visit:    Impaired gait and mobility  -     Ambulatory Referral/Consult to Physical/Occupational Therapy; Future    Closed nondisplaced pilon fracture of right tibia with routine healing, subsequent encounter s/p ORIF 8/17/24    Closed displaced fracture of right acetabulum with routine healing, unspecified portion of acetabulum, subsequent encounter    Closed fracture of superior ramus of right pubis with routine healing, subsequent encounter      Lorena Contreras is a 74 y.o. female with chronic posttraumatic right ankle/foot pain.  History of multiple orthopedic surgeries.  Likely has primarily somatic pain though may have some degree of neuropathic pain as well..    Pertinent imaging studies reviewed by me. Imaging results were discussed with patient.  Refer to outpatient physical therapy for reconditioning and gait training.  I stressed the importance of physical therapy and resuming normal range of motion and mobility.  Patient and daughter expressed understanding.    I stressed that she will likely have some degree of chronic right foot and ankle pain  given the severity of her injury and subsequent surgeries.  Patient expressed understanding and agreement.    We discussed that she should use her remaining oxycodone to wean herself off of this medication.  She is already taking a relatively low dose of medication and weaning is very unlikely to cause any symptoms of withdrawal.  Patient and daughter expressed understanding.  Would consider medications for neuropathic pain.  Initially planned to start nortriptyline, but due to interaction with fluoxetine we will not utilize this medication.  Patient has already failed gabapentin and Lyrica.  Return to clinic in 6 weeks or sooner if needed.  At that time we will discuss progress with physical therapy.            This note was created by combination of typed  and M-Modal dictation. Transcription and phonetic errors may be present.  If there are any questions, please contact me.

## 2025-01-28 ENCOUNTER — TELEPHONE (OUTPATIENT)
Dept: FAMILY MEDICINE | Facility: CLINIC | Age: 75
End: 2025-01-28
Payer: MEDICARE

## 2025-01-28 NOTE — TELEPHONE ENCOUNTER
----- Message from ESCOBAR Lamar sent at 1/16/2025  3:25 PM CST -----  Please let her know her HgbA1c (diabetes screen) has come down to 5.9, which is excellent. Keep up the good work and follow up with Endocrinology in the next month or two for further management. Her kidney function has decreased a bit and I discussed this with Dr. Paris. We think it is time she see a kidney doctor, so I will put in a referral to Nephrology. Otherwise her blood counts and iron levels are looking much better. Please continue with a heart healthy diet, avoiding fried foods. Please let us know if she has any questions.     Thanks so much!

## 2025-01-31 DIAGNOSIS — I25.118 CORONARY ARTERY DISEASE OF NATIVE ARTERY OF NATIVE HEART WITH STABLE ANGINA PECTORIS: ICD-10-CM

## 2025-01-31 NOTE — TELEPHONE ENCOUNTER
Refill Routing Note   Medication(s) are not appropriate for processing by [facility] for the following reason(s):        Responsible provider unclear  ED/Hospital Visit since last OV with provider  Required labs abnormal    [facility] action(s):  Defer      Medication Therapy Plan: Last ordered:10/22/24 by Karl Rico MD. Recent OV but no current order by PCP      Appointments  past 12m or future 3m with PCP    Date Provider   Last Visit   11/26/2024 Jorge Paris MD   Next Visit   Visit date not found Jorge Paris MD   ED visits in past 90 days: 1        Note composed:12:08 PM 01/31/2025

## 2025-01-31 NOTE — TELEPHONE ENCOUNTER
No care due was identified.  [facility] Embedded Care Due Messages. Reference number: [id number].   1/31/2025 10:15:46 AM CST

## 2025-02-01 ENCOUNTER — PATIENT MESSAGE (OUTPATIENT)
Dept: CARDIOLOGY | Facility: CLINIC | Age: 75
End: 2025-02-01
Payer: MEDICARE

## 2025-02-01 DIAGNOSIS — I25.118 CORONARY ARTERY DISEASE OF NATIVE ARTERY OF NATIVE HEART WITH STABLE ANGINA PECTORIS: ICD-10-CM

## 2025-02-01 NOTE — TELEPHONE ENCOUNTER
No care due was identified.  [facility] Embedded Care Due Messages. Reference number: [id number].   2/01/2025 12:40:39 PM CST

## 2025-02-03 ENCOUNTER — PATIENT MESSAGE (OUTPATIENT)
Dept: FAMILY MEDICINE | Facility: CLINIC | Age: 75
End: 2025-02-03
Payer: MEDICARE

## 2025-02-03 DIAGNOSIS — I25.118 CORONARY ARTERY DISEASE OF NATIVE ARTERY OF NATIVE HEART WITH STABLE ANGINA PECTORIS: ICD-10-CM

## 2025-02-03 RX ORDER — TICAGRELOR 90 MG/1
90 TABLET ORAL 2 TIMES DAILY
Qty: 180 TABLET | Refills: 0 | OUTPATIENT
Start: 2025-02-03

## 2025-02-03 NOTE — TELEPHONE ENCOUNTER
No care due was identified.  [facility] Embedded Care Due Messages. Reference number: [id number].   2/03/2025 10:51:20 AM CST

## 2025-02-07 ENCOUNTER — OFFICE VISIT (OUTPATIENT)
Dept: FAMILY MEDICINE | Facility: CLINIC | Age: 75
End: 2025-02-07
Payer: MEDICARE

## 2025-02-07 DIAGNOSIS — I10 PRIMARY HYPERTENSION: ICD-10-CM

## 2025-02-07 DIAGNOSIS — R42 VERTIGO: ICD-10-CM

## 2025-02-07 DIAGNOSIS — R42 DIZZINESS: Primary | ICD-10-CM

## 2025-02-07 NOTE — PROGRESS NOTES
The patient location is: Patient Home  The chief complaint leading to consultation is: as below  Visit type: Virtual visit with synchronous audio and video    Total time spent with patient: 15 minutes  Each patient to whom he or she provides medical services by telemedicine is:  (1) informed of the relationship between the physician and patient and the respective role of any other health care provider with respect to management of the patient; and (2) notified that she may decline to receive medical services by telemedicine and may withdraw from such care at any time.      PRABHA Contreras is a 74 y.o. female with multiple medical diagnoses as listed in the medical history and problem list that presents for dizziness and hypertension. PCP Dr. Paris with last visit in this clinic on 1/14/25.       Hypertension  This is a recurrent problem. The current episode started more than 1 year ago. The problem has been waxing and waning since onset. The problem is controlled. Associated symptoms include malaise/fatigue. Pertinent negatives include no anxiety, blurred vision, chest pain, headaches, neck pain, orthopnea, palpitations, peripheral edema, PND, shortness of breath or sweats. Agents associated with hypertension include NSAIDs. Risk factors for coronary artery disease include diabetes mellitus, sedentary lifestyle and stress. Past treatments include beta blockers. The current treatment provides mild improvement. Compliance problems include exercise.      Pleasant patient presents today with continued dizziness and intermittent hypertension. She was seen by me for similar concerns on 1/14/25. Was encouraged to take Meclizine PRN for vertigo and to schedule follow up with Cardiology at that time. Has Cardiology f/u scheduled on 2/27/25.    Since her last visit with me had a fall and hospital encounter; was negative for any fractures or injuries. Patient reports that she's been working with PT on muscle  strengthening and balance. Reports that when she is doing therapy and changing positions she gets dizzy. PT checked her orthostatic BP's and there was a significant change between sitting and standing. Discussed the possibility of orthostatic hypotension, and the importance of taking time when changing positions.     Reports BP at home can vary greatly. SBP ranges from 120's-180's. DBP ranges from 40-60's.     Assessment & Plan     1. Dizziness    Discussed importance of slow position due to apparent changes in blood pressure between lying, sitting, and standing.  Discussed importance of hydration with water, and recommended keeping small snacks such as saltines by her bed or couch to be consumed before changing positions.  Encouraged to keep appointment with Cardiology this month.  Follow up if symptoms worsen or fail to improve.    2. Vertigo    Patient encouraged to continue taking meclizine as needed for vertigo symptoms.  Continued to change position slowly.  Continue to use assistive devices when walking or transferring.    3. Primary hypertension    Systolic BP has been running high at home, with diastolic BP running low. Takes Lasix twice daily, Imdur daily, metoprolol daily, and hydralazine 3 times a day. Discussed holding hydralazine for DBP <50. Encouraged to monitor BP frequently-  Sit calmly for several minutes before checking blood pressure and making sure legs are not crossed. Stay hydrated. Keep follow up with Cardiology. Follow up with clinic for any worsening symptoms, or if symptoms fail to improve.             Discussed DDx, condition, and treatment.   Education sent to patient portal/included in after visit summary.  ED precautions given.   Notify provider if symptoms do not resolve or increase in severity.   Patient verbalizes understanding and agrees with plan of care.  --------------------------------------------      Health Maintenance:  Health Maintenance         Date Due Completion Date     COVID-19 Vaccine (1) Never done ---    Shingles Vaccine (1 of 2) Never done ---    RSV Vaccine (Age 60+ and Pregnant patients) (1 - Risk 60-74 years 1-dose series) Never done ---    DEXA Scan 12/08/2018 12/8/2016    Mammogram 03/26/2019 3/26/2018    Foot Exam 10/20/2023 10/20/2022    Influenza Vaccine (1) Never done ---    Diabetic Eye Exam 02/28/2025 2/28/2024    Diabetes Urine Screening 07/10/2025 7/10/2024    Hemoglobin A1c 07/14/2025 1/14/2025    Lipid Panel 01/14/2026 1/14/2025    TETANUS VACCINE 05/28/2031 5/28/2021    Colorectal Cancer Screening 11/04/2031 11/4/2024            Discussed the importance of overdue vaccines which were offered during this encounter. Patient declined overdue vaccines at this time and Advised patient on the importance of completing overdue health maintenance items    Follow Up:  Follow up if symptoms worsen or fail to improve.    Exam     Review of Systems:  (as noted above)  Review of Systems   Constitutional:  Positive for malaise/fatigue. Negative for fever.   HENT:  Negative for trouble swallowing.    Eyes:  Negative for blurred vision.   Respiratory:  Negative for shortness of breath.    Cardiovascular:  Negative for chest pain, palpitations, orthopnea and PND.   Gastrointestinal:  Negative for blood in stool and vomiting.   Genitourinary:  Negative for hematuria.   Musculoskeletal:  Negative for neck pain.   Skin:  Negative for rash.   Neurological:  Positive for dizziness. Negative for syncope, weakness and headaches.       Physical Exam:   Physical Exam  Constitutional:       General: She is not in acute distress.     Appearance: Normal appearance.   HENT:      Head: Normocephalic and atraumatic.   Pulmonary:      Effort: Pulmonary effort is normal.   Skin:     General: Skin is warm and dry.   Neurological:      General: No focal deficit present.      Mental Status: She is alert and oriented to person, place, and time.   Psychiatric:         Mood and Affect: Mood normal.          Behavior: Behavior normal.       There were no vitals filed for this visit.   There is no height or weight on file to calculate BMI.    History     Past Medical History:  Past Medical History:   Diagnosis Date    Age-related osteoporosis with current pathological fracture with routine healing 11/13/2024    Allergy     Altered mental status 06/19/2022    DYSARTHRIA, SPASTIC MOVEMENTS & DIFFICULTY SWALLOWING    Anemia     Anxiety     Arthritis     Cataract     both removed    Colon polyps     Coronary artery disease     Depression     Diabetes mellitus, type II     Disorder of kidney and ureter     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/2023    Fibromyalgia     Follicular lymphoma     GERD (gastroesophageal reflux disease)     HTN (hypertension)     Hyperlipidemia     MI (myocardial infarction) 03/2019    Personal history of colonic polyps     Restless leg syndrome     Stroke        Past Surgical History:  Past Surgical History:   Procedure Laterality Date    APPLICATION OF WOUND VACUUM-ASSISTED CLOSURE DEVICE Right 9/25/2024    Procedure: APPLICATION, WOUND VAC;  Surgeon: Neto Davalos MD;  Location: [address];  Service: Orthopedics;  Laterality: Right;    COLONOSCOPY  11/07/2012    Colon polyp found; repeat in 5 years    COLONOSCOPY N/A 11/4/2024    Procedure: COLONOSCOPY;  Surgeon: David Castaneda MD;  Location: [address]);  Service: Endoscopy;  Laterality: N/A;    DEBRIDEMENT OF LOCAL FLAP Right 9/25/2024    Procedure: DEBRIDEMENT, LOCAL FLAP;  Surgeon: Neto Davalos MD;  Location: [address];  Service: Orthopedics;  Laterality: Right;    ELBOW SURGERY Right 2015    dislocation repair     ESOPHAGOGASTRODUODENOSCOPY  11/07/2012    atrophic gastritis, H pylori testing negative    INCISION AND DRAINAGE FOOT Right 6/2/2021    Procedure: INCISION AND DRAINAGE, FOOT, bone biopsy;  Surgeon: Quiana Penn DPM;  Location: [facility] OR;  Service: Podiatry;  Laterality: Right;     IRRIGATION AND DEBRIDEMENT OF LOWER EXTREMITY Right 9/25/2024    Procedure: IRRIGATION AND DEBRIDEMENT, LOWER EXTREMITY; slider table, supine, bone foam, cysto tubing, 6L NS/dakins/peroxide, culture swabs;  Surgeon: Neto Davalos MD;  Location: [facility] OR [address] [place] [address];  Service: Orthopedics;  Laterality: Right;    KNEE SURGERY Bilateral 2015    scoped    LEFT HEART CATHETERIZATION Left 3/29/2019    Procedure: Left heart cath;  Surgeon: Bladimir Barbosa MD;  Location: [facility] CATH LAB;  Service: Cardiology;  Laterality: Left;    LEFT HEART CATHETERIZATION Left 11/18/2019    Procedure: Left heart cath;  Surgeon: Karl Rico MD;  Location: [facility] CATH LAB;  Service: Cardiology;  Laterality: Left;    LEFT HEART CATHETERIZATION Left 1/8/2020    Procedure: Left heart cath, right radial, noon start;  Surgeon: Christos Monreal MD;  Location: [facility] CATH LAB;  Service: Cardiology;  Laterality: Left;  RN Pre Op 1-6-20.  To be admitted 1-7-20 sor Aspirin Disensitation    OPEN REDUCTION AND INTERNAL FIXATION (ORIF) OF FRACTURE OF ACETABULUM Right 8/16/2024    Procedure: ORIF, FRACTURE, ACETABULUM;  Surgeon: Neto Davalos MD;  Location: [facility] OR [address] [place] [address];  Service: Orthopedics;  Laterality: Right;  anterior and lateral pelvic incisions    OPEN REDUCTION AND INTERNAL FIXATION (ORIF) OF PILON FRACTURE Right 8/14/2024    Procedure: ORIF, FRACTURE, PILON;  Surgeon: Neto Davalos MD;  Location: [facility] OR [address] [place] [address];  Service: Orthopedics;  Laterality: Right;    TONSILLECTOMY  1955    ULTRASOUND GUIDANCE  1/8/2020    Procedure: Ultrasound Guidance;  Surgeon: Christos Monreal MD;  Location: [facility] CATH LAB;  Service: Cardiology;;       Social History:  Social History     Socioeconomic History    Marital status:     Number of children: 2   Occupational History    Occupation: house wife    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  2 dtr.  Lives with GS.  3 cats and a dog.  Retired.  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  Lives in house, 1 story and 4 steps up and has a ramp.      Enjoys crafting.  Unable to bowl due to myalgias.       Social Drivers of Health     Financial Resource Strain: Low Risk  (10/31/2024)    Overall Financial Resource Strain (CARDIA)     Difficulty of Paying Living Expenses: Not hard at all   Recent Concern: Financial Resource Strain - Medium Risk (10/31/2024)    Overall Financial Resource Strain (CARDIA)     Difficulty of Paying Living Expenses: Somewhat hard   Food Insecurity: No Food Insecurity (10/31/2024)    Hunger Vital Sign     Worried About Running Out of Food in the Last Year: Never true     Ran Out of Food in the Last Year: Never true   Transportation Needs: No Transportation Needs (10/31/2024)    TRANSPORTATION NEEDS     Transportation : No   Physical Activity: Inactive (10/31/2024)    Exercise Vital Sign     Days of Exercise per Week: 0 days     Minutes of Exercise per Session: 0 min   Stress: Stress Concern Present (10/31/2024)    [employer] - Occupational Stress Questionnaire     Feeling of Stress : Very much   Housing Stability: Low Risk  (10/31/2024)    Housing Stability Vital Sign     Unable to Pay for Housing in the Last Year: No     Homeless in the Last Year: No       Family History:  Family History   Problem Relation Name Age of Onset    Cancer Mother          colon    Heart disease Mother      Cancer Father          lung    Lung cancer Brother      Diabetes Sister      Hypertension Sister      Allergy (severe) Daughter erin     No Known Problems Daughter      Stroke Neg Hx      Hyperlipidemia Neg Hx         Allergies and Medications: (updated and reviewed)  Review of patient's allergies indicates:    Allergen Reactions    Novolin 70/30 (semi-synthetic) Nausea And Vomiting     Severe vomiting on Relion 70/30    Sulfa (sulfonamide antibiotics) Anaphylaxis    Talwin [pentazocine lactate] Anaphylaxis    Victoza [liraglutide] Nausea And Vomiting    Glipizide Nausea Only    Codeine     Influenza virus vaccines Hives    Iodine and iodide containing products Hives    Levetiracetam Itching    Lyrica [pregabalin] Hallucinations    Neurontin [gabapentin]      Possible associated myoclonic jerk    Rituxan [rituximab] Hives    Zoloft [sertraline] Nausea And Vomiting     Current Outpatient Medications   Medication Sig Dispense Refill    albuterol (PROVENTIL/VENTOLIN HFA) 90 mcg/actuation inhaler Inhale 2 puffs into the lungs every 6 (six) hours as needed for Wheezing or Shortness of Breath. 18 g 1    aluminum & magnesium hydroxide-simethicone (MYLANTA MAX STRENGTH) 400-400-40 mg/5 mL suspension Take 30 mLs by mouth every 6 (six) hours as needed for Indigestion.      aspirin 81 MG Chew Take 1 tablet (81 mg total) by mouth once daily. Hold to avoid bleed risk on triple therapy and then resume on oct 27 once eliquis stops on oct 26th      atorvastatin (LIPITOR) 80 MG tablet Take 1 tablet (80 mg total) by mouth every evening. 90 tablet 3    DEXCOM G7  Misc Use 1  to track blood glucose, ICD10: E11.65 1 each 0    DEXCOM G7 SENSOR Samina Use 1 sensor every 10 days to track blood glucose, ICD10: E11.65, okay with 90 day supply if possible 3 each 11    FLUoxetine 40 MG capsule Take 1 capsule (40 mg total) by mouth once daily. 90 capsule 3    furosemide (LASIX) 40 MG tablet Take 1 tablet (40 mg total) by mouth 2 (two) times daily. 60 tablet 2    hydrALAZINE (APRESOLINE) 100 MG tablet Take 1 tablet (100 mg total) by mouth every 8 (eight) hours. 135 tablet 3    insulin aspart U-100 (NOVOLOG) 100 unit/mL (3 mL) InPn pen Inject 0-10 Units into the skin before meals and at bedtime as needed (Hyperglycemia).      insulin  "glargine U-100, Lantus, 100 unit/mL (3 mL) SubQ InPn pen Inject 10 Units into the skin every evening. 15 mL 6    isosorbide mononitrate (IMDUR) 60 MG 24 hr tablet Take 1 tablet (60 mg total) by mouth once daily. 30 tablet 11    LORazepam (ATIVAN) 1 MG tablet TAKE 1 TABLET BY MOUTH ONCE DAILY AS NEEDED FOR ANXIETY 30 tablet 0    meclizine (ANTIVERT) 12.5 mg tablet Take 1 tablet (12.5 mg total) by mouth 2 (two) times daily as needed for Dizziness. 28 tablet 0    metoprolol succinate (TOPROL-XL) 25 MG 24 hr tablet Take 1 tablet (25 mg total) by mouth once daily. 90 tablet 3    NOVOLOG FLEXPEN U-100 INSULIN 100 unit/mL (3 mL) InPn pen Inject 10 Units into the skin 3 (three) times daily with meals. 15 mL 8    ondansetron (ZOFRAN-ODT) 8 MG TbDL Dissolve 1 tablet (8 mg total) by mouth every 8 (eight) hours as needed (nausea). 20 tablet 2    pantoprazole (PROTONIX) 40 MG tablet Take 1 tablet (40 mg total) by mouth once daily. 90 tablet 3    pen needle, diabetic (BD ULTRA-FINE SAGAR PEN NEEDLE) 32 gauge x 5/32" Ndle One pen needle use with insulin pen 4 times a day.  ICD-10: E11.9 150 each 11    QUEtiapine (SEROQUEL) 50 MG tablet Take 1 tablet (50 mg total) by mouth nightly as needed (insomnia). 90 tablet 3    ticagrelor (BRILINTA) 60 mg tablet Take 1 tablet (60 mg total) by mouth 2 (two) times daily. 180 tablet 3    tobramycin-dexAMETHasone 0.3-0.1% (TOBRADEX) 0.3-0.1 % DrpS Place 1 drop into the right eye 4 (four) times daily.      triamcinolone acetonide 0.1% (KENALOG) 0.1 % cream Apply topically 2 (two) times daily. 80 g 2     No current facility-administered medications for this visit.       Patient Care Team:  Jorge Paris MD as PCP - General (Internal Medicine)  Javier Reilly OD as Consulting Physician (Optometry)  Aiden Zee OD as Consulting Physician (Ophthalmology)  Mary Bojorquez III, MD as Consulting Physician (Orthopedic Surgery)  [facility] - (DME Provider)  Leelee, " Kimberly as ED Navigator  Stephanie Guerrero LPN as Care Coordinator       - The patient was sent an After Visit Summary virtually that lists all medications with directions, allergies, education, orders placed during this encounter and follow-up instructions.      - I have reviewed the patient's medical information including past medical, family, and social history sections including the medications and allergies.      - We discussed the patient's current medications.     This note was created by combination of typed  and MModal dictation.  Transcription errors may be present.  If there are any questions, please contact me.                   ESCOBAR Lamar

## 2025-02-13 ENCOUNTER — TELEPHONE (OUTPATIENT)
Dept: NEUROLOGY | Facility: CLINIC | Age: 75
End: 2025-02-13
Payer: MEDICARE

## 2025-02-15 ENCOUNTER — HOSPITAL ENCOUNTER (INPATIENT)
Facility: HOSPITAL | Age: 75
LOS: 10 days | Discharge: HOME HEALTH | DRG: 242 | End: 2025-02-25
Attending: STUDENT IN AN ORGANIZED HEALTH CARE EDUCATION/TRAINING PROGRAM
Payer: MEDICARE

## 2025-02-15 DIAGNOSIS — I25.118 CORONARY ARTERY DISEASE OF NATIVE ARTERY OF NATIVE HEART WITH STABLE ANGINA PECTORIS: ICD-10-CM

## 2025-02-15 DIAGNOSIS — R79.89 TROPONIN LEVEL ELEVATED: ICD-10-CM

## 2025-02-15 DIAGNOSIS — I44.2 COMPLETE HEART BLOCK: ICD-10-CM

## 2025-02-15 DIAGNOSIS — L89.156 PRESSURE INJURY OF DEEP TISSUE OF SACRAL REGION: ICD-10-CM

## 2025-02-15 DIAGNOSIS — R00.1 BRADYCARDIA: ICD-10-CM

## 2025-02-15 DIAGNOSIS — I44.2 CHB (COMPLETE HEART BLOCK): Primary | ICD-10-CM

## 2025-02-15 DIAGNOSIS — N18.32 ACUTE KIDNEY INJURY SUPERIMPOSED ON STAGE 3B CHRONIC KIDNEY DISEASE: ICD-10-CM

## 2025-02-15 DIAGNOSIS — F41.9 ANXIETY: Chronic | ICD-10-CM

## 2025-02-15 DIAGNOSIS — R79.89 ELEVATED TROPONIN: ICD-10-CM

## 2025-02-15 DIAGNOSIS — R07.9 CHEST PAIN: ICD-10-CM

## 2025-02-15 DIAGNOSIS — N17.9 ACUTE KIDNEY INJURY SUPERIMPOSED ON STAGE 3B CHRONIC KIDNEY DISEASE: ICD-10-CM

## 2025-02-15 DIAGNOSIS — E78.2 MIXED HYPERLIPIDEMIA: ICD-10-CM

## 2025-02-15 PROBLEM — E11.9 DIABETES MELLITUS: Status: ACTIVE | Noted: 2025-02-15

## 2025-02-15 LAB
ALBUMIN SERPL BCP-MCNC: 3.3 G/DL (ref 3.5–5.2)
ALP SERPL-CCNC: 217 U/L (ref 40–150)
ALT SERPL W/O P-5'-P-CCNC: 42 U/L (ref 10–44)
ANION GAP SERPL CALC-SCNC: 14 MMOL/L (ref 8–16)
ASCENDING AORTA: 2.34 CM
AST SERPL-CCNC: 44 U/L (ref 10–40)
AV INDEX (PROSTH): 0.27
AV MEAN GRADIENT: 27 MMHG
AV PEAK GRADIENT: 46 MMHG
AV VALVE AREA BY VELOCITY RATIO: 0.6 CM²
AV VALVE AREA: 0.8 CM²
AV VELOCITY RATIO: 0.21
BACTERIA #/AREA URNS HPF: ABNORMAL /HPF
BASOPHILS # BLD AUTO: 0.08 K/UL (ref 0–0.2)
BASOPHILS NFR BLD: 0.9 % (ref 0–1.9)
BILIRUB SERPL-MCNC: 0.2 MG/DL (ref 0.1–1)
BILIRUB UR QL STRIP: NEGATIVE
BILIRUB UR QL STRIP: NEGATIVE
BNP SERPL-MCNC: 1115 PG/ML (ref 0–99)
BUN SERPL-MCNC: 83 MG/DL (ref 8–23)
CALCIUM SERPL-MCNC: 8.8 MG/DL (ref 8.7–10.5)
CHLORIDE SERPL-SCNC: 107 MMOL/L (ref 95–110)
CLARITY UR: ABNORMAL
CLARITY UR: ABNORMAL
CO2 SERPL-SCNC: 15 MMOL/L (ref 23–29)
COLOR UR: YELLOW
COLOR UR: YELLOW
CREAT SERPL-MCNC: 3.5 MG/DL (ref 0.5–1.4)
CREAT UR-MCNC: 78 MG/DL (ref 15–325)
CV ECHO LV RWT: 0.74 CM
DIFFERENTIAL METHOD BLD: ABNORMAL
DOP CALC AO PEAK VEL: 3.4 M/S
DOP CALC AO VTI: 92.9 CM
DOP CALC LVOT AREA: 3.1 CM2
DOP CALC LVOT DIAMETER: 2 CM
DOP CALC LVOT PEAK VEL: 0.7 M/S
DOP CALC LVOT STROKE VOLUME: 77.9 CM3
DOP CALC MV VTI: 58.2 CM
DOP CALCLVOT PEAK VEL VTI: 24.8 CM
E WAVE DECELERATION TIME: 228 MSEC
E/A RATIO: 1.24
E/E' RATIO: 22 M/S
ECHO LV POSTERIOR WALL: 1.4 CM (ref 0.6–1.1)
EOSINOPHIL # BLD AUTO: 0.2 K/UL (ref 0–0.5)
EOSINOPHIL NFR BLD: 2.4 % (ref 0–8)
ERYTHROCYTE [DISTWIDTH] IN BLOOD BY AUTOMATED COUNT: 14.7 % (ref 11.5–14.5)
EST. GFR  (NO RACE VARIABLE): 13 ML/MIN/1.73 M^2
FRACTIONAL SHORTENING: 26.3 % (ref 28–44)
GLUCOSE SERPL-MCNC: 158 MG/DL (ref 70–110)
GLUCOSE UR QL STRIP: NEGATIVE
GLUCOSE UR QL STRIP: NEGATIVE
HCT VFR BLD AUTO: 32.2 % (ref 37–48.5)
HGB BLD-MCNC: 10.5 G/DL (ref 12–16)
HGB UR QL STRIP: NEGATIVE
HGB UR QL STRIP: NEGATIVE
HYALINE CASTS #/AREA URNS LPF: 3 /LPF
IMM GRANULOCYTES # BLD AUTO: 0.04 K/UL (ref 0–0.04)
IMM GRANULOCYTES NFR BLD AUTO: 0.4 % (ref 0–0.5)
INTERVENTRICULAR SEPTUM: 1.4 CM (ref 0.6–1.1)
IVC DIAMETER: 2.01 CM
IVRT: 99 MSEC
KETONES UR QL STRIP: NEGATIVE
KETONES UR QL STRIP: NEGATIVE
LA MAJOR: 6.5 CM
LA MINOR: 6.2 CM
LA WIDTH: 4.7 CM
LEFT ATRIUM SIZE: 5.1 CM
LEFT ATRIUM VOLUME: 129 CM3
LEFT INTERNAL DIMENSION IN SYSTOLE: 2.8 CM (ref 2.1–4)
LEFT VENTRICLE DIASTOLIC VOLUME: 63.23 ML
LEFT VENTRICLE SYSTOLIC VOLUME: 29.38 ML
LEFT VENTRICULAR INTERNAL DIMENSION IN DIASTOLE: 3.8 CM (ref 3.5–6)
LEFT VENTRICULAR MASS: 194.1 G
LEUKOCYTE ESTERASE UR QL STRIP: ABNORMAL
LEUKOCYTE ESTERASE UR QL STRIP: ABNORMAL
LIPASE SERPL-CCNC: 26 U/L (ref 4–60)
LV LATERAL E/E' RATIO: 21.9 M/S
LV SEPTAL E/E' RATIO: 21.9 M/S
LVED V (TEICH): 63.23 ML
LVES V (TEICH): 29.38 ML
LVOT MG: 1.28 MMHG
LVOT MV: 0.54 CM/S
LYMPHOCYTES # BLD AUTO: 1.9 K/UL (ref 1–4.8)
LYMPHOCYTES NFR BLD: 20 % (ref 18–48)
MCH RBC QN AUTO: 29.7 PG (ref 27–31)
MCHC RBC AUTO-ENTMCNC: 32.6 G/DL (ref 32–36)
MCV RBC AUTO: 91 FL (ref 82–98)
MICROSCOPIC COMMENT: ABNORMAL
MONOCYTES # BLD AUTO: 0.8 K/UL (ref 0.3–1)
MONOCYTES NFR BLD: 8.3 % (ref 4–15)
MV MEAN GRADIENT: 5 MMHG
MV PEAK A VEL: 1.23 M/S
MV PEAK E VEL: 1.53 M/S
MV PEAK GRADIENT: 11 MMHG
MV STENOSIS PRESSURE HALF TIME: 65.99 MS
MV VALVE AREA BY CONTINUITY EQUATION: 1.34 CM2
MV VALVE AREA P 1/2 METHOD: 3.33 CM2
NEUTROPHILS # BLD AUTO: 6.3 K/UL (ref 1.8–7.7)
NEUTROPHILS NFR BLD: 68 % (ref 38–73)
NITRITE UR QL STRIP: NEGATIVE
NITRITE UR QL STRIP: NEGATIVE
NON-SQ EPI CELLS #/AREA URNS HPF: 0 /HPF
NRBC BLD-RTO: 0 /100 WBC
OHS CV RV/LV RATIO: 1 CM
PH UR STRIP: 8 [PH] (ref 5–8)
PH UR STRIP: 8 [PH] (ref 5–8)
PISA TR MAX VEL: 3.2 M/S
PLATELET # BLD AUTO: 209 K/UL (ref 150–450)
PMV BLD AUTO: 12.8 FL (ref 9.2–12.9)
POCT GLUCOSE: 212 MG/DL (ref 70–110)
POCT GLUCOSE: 226 MG/DL (ref 70–110)
POCT GLUCOSE: 246 MG/DL (ref 70–110)
POTASSIUM SERPL-SCNC: 4.3 MMOL/L (ref 3.5–5.1)
PROT SERPL-MCNC: 7.4 G/DL (ref 6–8.4)
PROT UR QL STRIP: ABNORMAL
PROT UR QL STRIP: ABNORMAL
PROT UR-MCNC: 70 MG/DL
PROT/CREAT UR: 0.9 MG/G{CREAT} (ref 0–0.2)
PV PEAK GRADIENT: 5 MMHG
PV PEAK VELOCITY: 1.1 M/S
RA MAJOR: 6.07 CM
RA PRESSURE ESTIMATED: 8 MMHG
RA WIDTH: 3.6 CM
RBC # BLD AUTO: 3.54 M/UL (ref 4–5.4)
RBC #/AREA URNS HPF: 0 /HPF (ref 0–4)
RIGHT VENTRICLE DIASTOLIC BASEL DIMENSION: 3.8 CM
RIGHT VENTRICULAR END-DIASTOLIC DIMENSION: 3.75 CM
RV TB RVSP: 11 MMHG
RV TISSUE DOPPLER FREE WALL SYSTOLIC VELOCITY 1 (APICAL 4 CHAMBER VIEW): 9.9 CM/S
SINUS: 2.7 CM
SODIUM SERPL-SCNC: 136 MMOL/L (ref 136–145)
SODIUM UR-SCNC: 38 MMOL/L (ref 20–250)
SP GR UR STRIP: 1.01 (ref 1–1.03)
SP GR UR STRIP: 1.01 (ref 1–1.03)
SQUAMOUS #/AREA URNS HPF: 0 /HPF
STJ: 1.92 CM
TDI LATERAL: 0.07 M/S
TDI SEPTAL: 0.07 M/S
TDI: 0.07 M/S
TR MAX PG: 41 MMHG
TRI-PHOS CRY URNS QL MICRO: ABNORMAL
TRICUSPID ANNULAR PLANE SYSTOLIC EXCURSION: 2.17 CM
TROPONIN I SERPL DL<=0.01 NG/ML-MCNC: 0.03 NG/ML (ref 0–0.03)
TROPONIN I SERPL DL<=0.01 NG/ML-MCNC: 0.03 NG/ML (ref 0–0.03)
TROPONIN I SERPL DL<=0.01 NG/ML-MCNC: 0.04 NG/ML (ref 0–0.03)
TROPONIN I SERPL DL<=0.01 NG/ML-MCNC: 0.04 NG/ML (ref 0–0.03)
TV REST PULMONARY ARTERY PRESSURE: 49 MMHG
URN SPEC COLLECT METH UR: ABNORMAL
URN SPEC COLLECT METH UR: ABNORMAL
UROBILINOGEN UR STRIP-ACNC: NEGATIVE EU/DL
UROBILINOGEN UR STRIP-ACNC: NEGATIVE EU/DL
WBC # BLD AUTO: 9.24 K/UL (ref 3.9–12.7)
WBC #/AREA URNS HPF: 11 /HPF (ref 0–5)

## 2025-02-15 PROCEDURE — 85025 COMPLETE CBC W/AUTO DIFF WBC: CPT | Mod: HCNC

## 2025-02-15 PROCEDURE — 83880 ASSAY OF NATRIURETIC PEPTIDE: CPT | Mod: HCNC

## 2025-02-15 PROCEDURE — 25000003 PHARM REV CODE 250: Mod: HCNC | Performed by: EMERGENCY MEDICINE

## 2025-02-15 PROCEDURE — 84484 ASSAY OF TROPONIN QUANT: CPT | Mod: 91,HCNC

## 2025-02-15 PROCEDURE — 63600175 PHARM REV CODE 636 W HCPCS: Mod: HCNC

## 2025-02-15 PROCEDURE — 20000000 HC ICU ROOM: Mod: HCNC

## 2025-02-15 PROCEDURE — 80053 COMPREHEN METABOLIC PANEL: CPT | Mod: HCNC

## 2025-02-15 PROCEDURE — 99291 CRITICAL CARE FIRST HOUR: CPT | Mod: HCNC,25,, | Performed by: INTERNAL MEDICINE

## 2025-02-15 PROCEDURE — 87186 SC STD MICRODIL/AGAR DIL: CPT | Mod: HCNC | Performed by: EMERGENCY MEDICINE

## 2025-02-15 PROCEDURE — 83690 ASSAY OF LIPASE: CPT | Mod: HCNC

## 2025-02-15 PROCEDURE — 63600175 PHARM REV CODE 636 W HCPCS: Mod: HCNC | Performed by: INTERNAL MEDICINE

## 2025-02-15 PROCEDURE — 25000003 PHARM REV CODE 250: Mod: HCNC

## 2025-02-15 PROCEDURE — 84484 ASSAY OF TROPONIN QUANT: CPT | Mod: 91,HCNC | Performed by: STUDENT IN AN ORGANIZED HEALTH CARE EDUCATION/TRAINING PROGRAM

## 2025-02-15 PROCEDURE — 87086 URINE CULTURE/COLONY COUNT: CPT | Mod: HCNC | Performed by: EMERGENCY MEDICINE

## 2025-02-15 PROCEDURE — 84484 ASSAY OF TROPONIN QUANT: CPT | Mod: 91,HCNC | Performed by: EMERGENCY MEDICINE

## 2025-02-15 PROCEDURE — 84300 ASSAY OF URINE SODIUM: CPT | Mod: HCNC | Performed by: INTERNAL MEDICINE

## 2025-02-15 PROCEDURE — 36415 COLL VENOUS BLD VENIPUNCTURE: CPT | Mod: HCNC | Performed by: EMERGENCY MEDICINE

## 2025-02-15 PROCEDURE — 81000 URINALYSIS NONAUTO W/SCOPE: CPT | Mod: HCNC | Performed by: EMERGENCY MEDICINE

## 2025-02-15 PROCEDURE — 99285 EMERGENCY DEPT VISIT HI MDM: CPT | Mod: 25,HCNC

## 2025-02-15 PROCEDURE — 99223 1ST HOSP IP/OBS HIGH 75: CPT | Mod: HCNC,,, | Performed by: PHYSICIAN ASSISTANT

## 2025-02-15 PROCEDURE — 93005 ELECTROCARDIOGRAM TRACING: CPT | Mod: HCNC

## 2025-02-15 PROCEDURE — 87088 URINE BACTERIA CULTURE: CPT | Mod: HCNC | Performed by: EMERGENCY MEDICINE

## 2025-02-15 PROCEDURE — 93010 ELECTROCARDIOGRAM REPORT: CPT | Mod: HCNC,,, | Performed by: INTERNAL MEDICINE

## 2025-02-15 PROCEDURE — 82570 ASSAY OF URINE CREATININE: CPT | Mod: HCNC | Performed by: INTERNAL MEDICINE

## 2025-02-15 RX ORDER — SODIUM CHLORIDE 0.9 % (FLUSH) 0.9 %
10 SYRINGE (ML) INJECTION
Status: DISCONTINUED | OUTPATIENT
Start: 2025-02-15 | End: 2025-02-25 | Stop reason: HOSPADM

## 2025-02-15 RX ORDER — INSULIN ASPART 100 [IU]/ML
10 INJECTION, SOLUTION INTRAVENOUS; SUBCUTANEOUS
Status: DISCONTINUED | OUTPATIENT
Start: 2025-02-15 | End: 2025-02-15

## 2025-02-15 RX ORDER — TALC
6 POWDER (GRAM) TOPICAL NIGHTLY PRN
Status: DISCONTINUED | OUTPATIENT
Start: 2025-02-15 | End: 2025-02-25 | Stop reason: HOSPADM

## 2025-02-15 RX ORDER — INSULIN ASPART 100 [IU]/ML
0-10 INJECTION, SOLUTION INTRAVENOUS; SUBCUTANEOUS
Status: DISCONTINUED | OUTPATIENT
Start: 2025-02-15 | End: 2025-02-25 | Stop reason: HOSPADM

## 2025-02-15 RX ORDER — FUROSEMIDE 40 MG/1
40 TABLET ORAL 2 TIMES DAILY
Status: DISCONTINUED | OUTPATIENT
Start: 2025-02-15 | End: 2025-02-21

## 2025-02-15 RX ORDER — IBUPROFEN 200 MG
24 TABLET ORAL
Status: DISCONTINUED | OUTPATIENT
Start: 2025-02-15 | End: 2025-02-25 | Stop reason: HOSPADM

## 2025-02-15 RX ORDER — ISOSORBIDE MONONITRATE 30 MG/1
60 TABLET, EXTENDED RELEASE ORAL DAILY
Status: DISCONTINUED | OUTPATIENT
Start: 2025-02-15 | End: 2025-02-25 | Stop reason: HOSPADM

## 2025-02-15 RX ORDER — PANTOPRAZOLE SODIUM 40 MG/1
40 TABLET, DELAYED RELEASE ORAL DAILY
Status: DISCONTINUED | OUTPATIENT
Start: 2025-02-15 | End: 2025-02-25 | Stop reason: HOSPADM

## 2025-02-15 RX ORDER — INSULIN GLARGINE 100 [IU]/ML
10 INJECTION, SOLUTION SUBCUTANEOUS NIGHTLY
Status: DISCONTINUED | OUTPATIENT
Start: 2025-02-15 | End: 2025-02-15

## 2025-02-15 RX ORDER — FLUOXETINE HYDROCHLORIDE 20 MG/1
40 CAPSULE ORAL DAILY
Status: DISCONTINUED | OUTPATIENT
Start: 2025-02-15 | End: 2025-02-25 | Stop reason: HOSPADM

## 2025-02-15 RX ORDER — HYDRALAZINE HYDROCHLORIDE 25 MG/1
100 TABLET, FILM COATED ORAL EVERY 8 HOURS
Status: DISCONTINUED | OUTPATIENT
Start: 2025-02-15 | End: 2025-02-25 | Stop reason: HOSPADM

## 2025-02-15 RX ORDER — MUPIROCIN 20 MG/G
OINTMENT TOPICAL 2 TIMES DAILY
Status: COMPLETED | OUTPATIENT
Start: 2025-02-15 | End: 2025-02-20

## 2025-02-15 RX ORDER — NITROGLYCERIN 0.4 MG/1
0.4 TABLET SUBLINGUAL EVERY 5 MIN PRN
Status: DISCONTINUED | OUTPATIENT
Start: 2025-02-15 | End: 2025-02-15

## 2025-02-15 RX ORDER — INSULIN ASPART 100 [IU]/ML
0-10 INJECTION, SOLUTION INTRAVENOUS; SUBCUTANEOUS
Status: DISCONTINUED | OUTPATIENT
Start: 2025-02-15 | End: 2025-02-15

## 2025-02-15 RX ORDER — NAPROXEN SODIUM 220 MG/1
81 TABLET, FILM COATED ORAL DAILY
Status: DISCONTINUED | OUTPATIENT
Start: 2025-02-15 | End: 2025-02-15

## 2025-02-15 RX ORDER — IPRATROPIUM BROMIDE AND ALBUTEROL SULFATE 2.5; .5 MG/3ML; MG/3ML
3 SOLUTION RESPIRATORY (INHALATION) EVERY 4 HOURS PRN
Status: DISCONTINUED | OUTPATIENT
Start: 2025-02-15 | End: 2025-02-25 | Stop reason: HOSPADM

## 2025-02-15 RX ORDER — SODIUM CHLORIDE 9 MG/ML
INJECTION, SOLUTION INTRAVENOUS CONTINUOUS
Status: DISCONTINUED | OUTPATIENT
Start: 2025-02-15 | End: 2025-02-16

## 2025-02-15 RX ORDER — HYDRALAZINE HYDROCHLORIDE 20 MG/ML
10 INJECTION INTRAMUSCULAR; INTRAVENOUS EVERY 4 HOURS PRN
Status: DISCONTINUED | OUTPATIENT
Start: 2025-02-15 | End: 2025-02-25 | Stop reason: HOSPADM

## 2025-02-15 RX ORDER — GLUCAGON 1 MG
1 KIT INJECTION
Status: DISCONTINUED | OUTPATIENT
Start: 2025-02-15 | End: 2025-02-25 | Stop reason: HOSPADM

## 2025-02-15 RX ORDER — INSULIN GLARGINE 100 [IU]/ML
10 INJECTION, SOLUTION SUBCUTANEOUS NIGHTLY
Status: DISCONTINUED | OUTPATIENT
Start: 2025-02-15 | End: 2025-02-17

## 2025-02-15 RX ORDER — ATORVASTATIN CALCIUM 40 MG/1
80 TABLET, FILM COATED ORAL NIGHTLY
Status: DISCONTINUED | OUTPATIENT
Start: 2025-02-15 | End: 2025-02-25 | Stop reason: HOSPADM

## 2025-02-15 RX ORDER — SIMETHICONE 80 MG
1 TABLET,CHEWABLE ORAL 3 TIMES DAILY PRN
Status: DISCONTINUED | OUTPATIENT
Start: 2025-02-15 | End: 2025-02-25 | Stop reason: HOSPADM

## 2025-02-15 RX ORDER — ATROPINE SULFATE 0.1 MG/ML
0.5 INJECTION INTRAVENOUS ONCE
Status: DISCONTINUED | OUTPATIENT
Start: 2025-02-15 | End: 2025-02-15

## 2025-02-15 RX ORDER — IBUPROFEN 200 MG
16 TABLET ORAL
Status: DISCONTINUED | OUTPATIENT
Start: 2025-02-15 | End: 2025-02-25 | Stop reason: HOSPADM

## 2025-02-15 RX ORDER — ALUMINUM HYDROXIDE, MAGNESIUM HYDROXIDE, AND SIMETHICONE 1200; 120; 1200 MG/30ML; MG/30ML; MG/30ML
30 SUSPENSION ORAL ONCE
Status: COMPLETED | OUTPATIENT
Start: 2025-02-15 | End: 2025-02-15

## 2025-02-15 RX ORDER — ONDANSETRON HYDROCHLORIDE 2 MG/ML
4 INJECTION, SOLUTION INTRAVENOUS EVERY 8 HOURS PRN
Status: DISCONTINUED | OUTPATIENT
Start: 2025-02-15 | End: 2025-02-19

## 2025-02-15 RX ORDER — LIDOCAINE HYDROCHLORIDE 20 MG/ML
15 SOLUTION OROPHARYNGEAL ONCE
Status: COMPLETED | OUTPATIENT
Start: 2025-02-15 | End: 2025-02-15

## 2025-02-15 RX ORDER — ACETAMINOPHEN 325 MG/1
650 TABLET ORAL EVERY 4 HOURS PRN
Status: DISCONTINUED | OUTPATIENT
Start: 2025-02-15 | End: 2025-02-19 | Stop reason: SDUPTHER

## 2025-02-15 RX ADMIN — HYDRALAZINE HYDROCHLORIDE 100 MG: 25 TABLET ORAL at 10:02

## 2025-02-15 RX ADMIN — INSULIN ASPART 4 UNITS: 100 INJECTION, SOLUTION INTRAVENOUS; SUBCUTANEOUS at 06:02

## 2025-02-15 RX ADMIN — HYDRALAZINE HYDROCHLORIDE 10 MG: 20 INJECTION INTRAMUSCULAR; INTRAVENOUS at 10:02

## 2025-02-15 RX ADMIN — ISOSORBIDE MONONITRATE 60 MG: 30 TABLET, EXTENDED RELEASE ORAL at 10:02

## 2025-02-15 RX ADMIN — ALUMINUM HYDROXIDE, MAGNESIUM HYDROXIDE, AND SIMETHICONE 30 ML: 1200; 120; 1200 SUSPENSION ORAL at 08:02

## 2025-02-15 RX ADMIN — MUPIROCIN: 20 OINTMENT TOPICAL at 10:02

## 2025-02-15 RX ADMIN — PANTOPRAZOLE SODIUM 40 MG: 40 TABLET, DELAYED RELEASE ORAL at 10:02

## 2025-02-15 RX ADMIN — Medication 6 MG: at 10:02

## 2025-02-15 RX ADMIN — FLUOXETINE HYDROCHLORIDE 40 MG: 20 CAPSULE ORAL at 10:02

## 2025-02-15 RX ADMIN — FUROSEMIDE 40 MG: 40 TABLET ORAL at 10:02

## 2025-02-15 RX ADMIN — ACETAMINOPHEN 650 MG: 325 TABLET ORAL at 10:02

## 2025-02-15 RX ADMIN — ATORVASTATIN CALCIUM 80 MG: 40 TABLET, FILM COATED ORAL at 10:02

## 2025-02-15 RX ADMIN — INSULIN GLARGINE 10 UNITS: 100 INJECTION, SOLUTION SUBCUTANEOUS at 10:02

## 2025-02-15 RX ADMIN — LIDOCAINE HYDROCHLORIDE 15 ML: 20 SOLUTION ORAL at 08:02

## 2025-02-15 RX ADMIN — HYDRALAZINE HYDROCHLORIDE 100 MG: 25 TABLET ORAL at 02:02

## 2025-02-15 RX ADMIN — INSULIN ASPART 4 UNITS: 100 INJECTION, SOLUTION INTRAVENOUS; SUBCUTANEOUS at 01:02

## 2025-02-15 RX ADMIN — SODIUM CHLORIDE: 9 INJECTION, SOLUTION INTRAVENOUS at 08:02

## 2025-02-15 NOTE — HPI
HPI  74-year-old female with a history of CKD, diastolic heart failure, CAD s/p PCI, hypertension, fibromyalgia, and lymphoma presents to ED for evaluation of 24 hours of epigastric to lower midline chest pain that radiates to her back and into the left side of her chest, left neck and left arm.  Describes the pain has a tightness.  Given 324 of 3 doses of nitro with the EMS.  Patient notes pain has improved.  Notes pain has a 4/10 at this time.  Denies any shortness of breath, nausea, vomiting.  She notes she does feel anxious.    Currently denies CP or SOB  Had CP yesterday in the ER. Has been feeling weak for several days  HR 40 with CHB  EKG NSR CHB old inferior Q waves - no acute STT changes    Troponin 0.03  Cr 3.5 baseline 2    Echo 2/15/25    Left Ventricle: The left ventricle is normal in size. There is moderate concentric hypertrophy. There is hyperdynamic systolic function with a visually estimated ejection fraction of 70 - 75%.    Right Ventricle: Normal right ventricular cavity size. Systolic function is normal.    Left Atrium: Left atrium is moderately dilated.    Right Atrium: Right atrium is mildly dilated.    Aortic Valve: There is moderate stenosis. Aortic valve area by VTI is 0.8 cm². Aortic valve peak velocity is 3.4 m/s. Mean gradient is 27 mmHg. The dimensionless index is 0.27.    Mitral Valve: There is mild stenosis. The mean pressure gradient across the mitral valve is 5 mmHg at a heart rate of 42  bpm. There is mild regurgitation.    Tricuspid Valve: There is moderate regurgitation.    Pulmonary Artery: The estimated pulmonary artery systolic pressure is 49 mmHg.    IVC/SVC: Intermediate venous pressure at 8 mmHg.    Known to me    CAD - JESIKA x 2 to RCA 3/29/19 - JESIKA to RI and Cx 1/8/20, HTN, HLD, DM     11/18/19 [facility] - EDP 16, LAD mid 50%, Cx long mid 80-90% - diffusely diseased - similar to [facility] 3/29/19, OM1 90% mid, RCA stents patent 50% beyond stents     Will review with  interventional staff for possible out patient PCI - continue with medical Rx for now     Previously followed by Dr Barbosa - last seen 5/16/19  Patient is here for follow-up of coronary artery disease and ST-elevation MI.  She underwent PCI to the RCA.  She is known to have other blockages well for which she re-presented to the emergency department was admitted for observation for atypical chest pain.  She felt like it was anxiety related and she no longer has had any since discharge.  She underwent nuclear stress test which showed no significant ischemia.  She denies any other associated symptoms currently.  She has experienced no PND, orthopnea or lower extremity edema.  She denies any dizziness, presyncope or syncope.  She says she was given Atarax on discharge which has helped with her anxiety.     Her follow-up coronary artery disease and previous ST-elevation MI.  She is feeling anxious and feels like she has a panic attack today.  She again is somewhat tearful and says that her  is been feeling ill as well.  She says she was started on Prozac by her primary care physician but does not feel like this is doing much in terms of calming her down.  She still has some mild residual chest pain when she mostly feels anxious.  This is dissimilar to her previous angina symptoms.  She denies any other associated symptoms.  She denies any PND, orthopnea or lower extremity edema.  She denies any dizziness, presyncope or syncope.      [facility] 1/8/20  1.  Successful PCI of proximal ramus with drug-eluting stent x1 (2.0 x 6 mm).  IVUS guidance was utilized for this PCI.  Post PCI IVUS demonstrated well-opposed and expanded stent.  MADINA 3 flow pre and post PCI     2.  Successful PCI of circumflex with drug-eluting stent x1 (2.25 x 22 mm).  MADINA 3 flow pre and post PCI     [facility] 11/18/19  Patent RCA mid stents with 50% lesion after stents  Prox Cx to Dist Cx lesion , 95% stenosed.  Ost 1st Mrg to 1st Mrg lesion , 90%  stenosed.  LVEDP (Pre): 16       C: 3-19  Three vessel coronary artery disease.  Successful PCI for acute myocardial infarction of culprit RCA.  Prox RCA lesion , 99% stenosed reduced to 0%..  A STENT RESOLUTE CRISSY [id number] stent was successfully placed at 14 ROGER  Mid RCA lesion , 95% stenosed reduced to 0%..  A STENT RESOLUTE CRISSY [id number] stent was successfully placed at 12 ROGER  Prox Cx to Dist Cx lesion , 95% stenosed.  Ost 1st Mrg to 1st Mrg lesion , 90% stenosed.  Diffusely diseased LAD which is small vessel as well     Echo 6/20/22  The left ventricle is normal in size with mild concentric hypertrophy and hyperdynamic systolic function.  The estimated ejection fraction is 75%.  Normal right ventricular size with normal right ventricular systolic function.  There is mild aortic valve stenosis.  Aortic valve area is 1.59 cm2; peak velocity is 2.71 m/s; mean gradient is 19 mmHg.  There is moderate mitral stenosis.  The mean diastolic gradient across the mitral valve is 8 mmHg at a heart rate of 90 bpm.  The estimated PA systolic pressure is 58 mmHg.  There is pulmonary hypertension.     Stress test 12/1/22    Normal myocardial perfusion scan. There is no evidence of myocardial ischemia or infarction.    The gated perfusion images showed an ejection fraction of 71% post stress.    The EKG portion of this study is negative for ischemia.    The patient reported no chest pain during the stress test.    There were no arrhythmias during stress.     Holter 7/17/19  Sinus rhythm with heart rates varying between 82 and 136 bpm with an average of 96 bpm.  There were very rare PVCs totalling 21 and averaging 0.44 per hour.  There were very rare PACs totalling 30 and averaging 0.63 per hour.  The diary was returned blank.     Carotid US 5/6/29  There is 20-39% right Internal Carotid Stenosis.  There is 0-19% left Internal Carotid Stenosis.      LE arterial doppler 5/6/19  Hemodynamically significant greater than 70% lesion  in the L SFA proximally  Moderate greater than 50% plaque noted in the right SFA  Noncompressible CLEMENT bilaterally     7/3/19 HR has been running in th 100-130 ranges even at rest at rehab  Has on atenolol previously which was switched to metoprolol after MI  Denies significant CP or SOB   sinus tachycardia - old IMI  Increase toprol XL 50 qd  Holter  OV 1 week     9/4/19 Says metoprolol gives her diarrhea - wants to go back to atenolol  Denies CP or SOB  Mild stable claudication     Admitted 11/17/19  Lorena Contreras 69 y.o. female with CAD, HTN, HLD, DM2 presents to the hospital with a chief complaint of chest pain. She reports today at 11am she developed sternal chest pain without radiation that was constant until relieved with nitro in the ED. She states it was not as severe as previous chest pain when she had her heart attack. She is pain free now. She does not experience exertional chest pain and finds she is able to mow her yard without pain. She does not need her nitro PRN at home. She denies fever, SOB, N/V, abdominal pain, dysuria, dizziness, syncope, hemoptysis.      Lorena Contreras 69 y.o. female placed in observation for chest pain. In the ED, EKG without acute ischemia, troponin negative, chest x-ray without acute process. Cardiology consulted. On 11/18/19,no chest pain overnight. LHC via Right fem art  showed :EDP 16, LAD mid 50%, Cx long mid 80-90% - diffusely diseased - similar to LHC 3/29/19, OM1 90% mid, RCA stents patent 50% beyond stents. Post procedure, no complications and HDS. Added Imdur and continue to Brilinta/statin. Allergy to ASA. Consider add ACEI/ARBS if blood pressure tolerates. Follow up with Dr. Rico 12/9/19 12/9/19 Stopped imdur - worrying about reaction with hives several days after LHC  Still with angina during emotional or physical stress   Doubt she is allergic to imdur - would restart  Increase atenolol 50 bid  Will refer to Dr Monreal to review LHC for  possible PCI  OV with me 3 months     21  recently  - liver failure. Reports stable exertional CP - rarely needs NTG  EKG NSR NSSTT changes  OV 3 months with echo and lexiscan myoview for CP, CAD  Continue Rx for HTN, CAD, HLD      Went to the ER 22  Lorena Contreras is a 71 y.o. female, with a PMHx of CAD, HTN, HLD, DM, Anemia, who presents to the ED with central chest pain onset yesterday. Pt reports chest pain has been constant and is exacerbated by anxiety. Associated symptoms of nausea, diarrhea, LE edema, numbness in all fingers (onset this evening) and numbness in feet (chronic). Pt currently does not feel chest pain. No other exacerbating or alleviating factors. Patient denies cough, fever, chills, SOB, visual disturbance, paresthesia, dysuria, or other associated symptoms. This is the extent of the patient's complaints today in the Emergency Department.       22 Reports left sided CP where her port-a-cath was - feels like a pulling sensation - different from previous angina  BP poorly controlled - admits to eating too much salt  Add diovan 160 qd for HTN  Lexiscan myoview for CP  Echo with EF 75% - mild AS/MS  BMP, BNP  Continue Rx for HTN, CAD, HLD     22 CP has resolved. Denies SOb  Labs not done  BP controlled  Stress test negative for ischemia. Needs labs done    Continue Rx for HTN, CAD, HLD, DM  OV 3 months     Labs 23  K 5.2  Cr 1.5 up from 1.2     23 Diovan stopped due to itching - symptoms resolved. Now having LE edema in the evening and intermittent chest tightness - but able to work in the yard without symptoms  Diuretic stopped by PCP after recent elevated Cr  EKG NSR old IMI

## 2025-02-15 NOTE — PLAN OF CARE
Attempted to answer CM consult for Advanced directive, pt asleep and unable to obtain necessary information. CM will follow up.

## 2025-02-15 NOTE — ASSESSMENT & PLAN NOTE
Hx of chest pain on presentation; different from previous anginal pains  EKG showed complete heart block  Troponin not elevated  Discontinue all AV frances blocking agents  Monitor patient on telemetry  NPO after midnight on Sunday; for ppm on 2/17

## 2025-02-15 NOTE — ASSESSMENT & PLAN NOTE
AS moderate    Echo  Result Date: 2/15/2025    Left Ventricle: The left ventricle is normal in size. There is moderate   concentric hypertrophy. There is hyperdynamic systolic function with a   visually estimated ejection fraction of 70 - 75%.    Right Ventricle: Normal right ventricular cavity size. Systolic   function is normal.    Left Atrium: Left atrium is moderately dilated.    Right Atrium: Right atrium is mildly dilated.    Aortic Valve: There is moderate stenosis. Aortic valve area by VTI is   0.8 cm². Aortic valve peak velocity is 3.4 m/s. Mean gradient is 27 mmHg.   The dimensionless index is 0.27.    Mitral Valve: There is mild stenosis. The mean pressure gradient across   the mitral valve is 5 mmHg at a heart rate of 42  bpm. There is mild   regurgitation.    Tricuspid Valve: There is moderate regurgitation.    Pulmonary Artery: The estimated pulmonary artery systolic pressure is   49 mmHg.    IVC/SVC: Intermediate venous pressure at 8 mmHg.        Echo  Result Date: 10/31/2024    Left Ventricle: The left ventricle is normal in size. Normal wall   thickness. Normal wall motion. There is normal systolic function with a   visually estimated ejection fraction of 60 - 65%. Grade II diastolic   dysfunction. Elevated left ventricular filling pressure.    Right Ventricle: Normal right ventricular cavity size. Wall thickness   is normal. Systolic function is normal.    Left Atrium: Left atrium is severely dilated.    Aortic Valve: There is moderate aortic valve sclerosis. Moderately   restricted motion. There is moderate stenosis. Aortic valve area by VTI is   1.1 cm2. Aortic valve peak velocity is 3.1 m/s. Mean gradient is 20.1   mmHg. The dimensionless index is 0.37.    Mitral Valve: There is mild regurgitation.    Tricuspid Valve: There is mild regurgitation.    Pulmonary Artery: There is pulmonary hypertension. The estimated   pulmonary artery systolic pressure is 46 mmHg.    IVC/SVC: Normal venous pressure  at 3 mmHg.

## 2025-02-15 NOTE — ASSESSMENT & PLAN NOTE
Creatine stable for now. BMP reviewed- noted Estimated Creatinine Clearance: 16.5 mL/min (A) (based on SCr of 3.5 mg/dL (H)). according to latest data. Based on current GFR, CKD stage is stage 4 - GFR 15-29.  Monitor UOP and serial BMP and adjust therapy as needed. Renally dose meds. Avoid nephrotoxic medications and procedures.

## 2025-02-15 NOTE — CONSULTS
[place] - Intensive Care  Cardiology  Consult Note    Patient Name: Lorena Contreras  MRN: [id number]  Admission Date: 2/15/2025  Hospital Length of Stay: 0 days  Code Status: Full Code   Attending Provider: Germaine Murray MD   Consulting Provider: Karl Rico MD  Primary Care Physician: Jorge Paris MD  Principal Problem:<principal problem not specified>    Patient information was obtained from patient and ER records.     Cardiology  Consult performed by: Karl Rico MD  Consult ordered by: López Soler MD        Subjective:     Chief Complaint:  CP, CHB            HPI  74-year-old female with a history of CKD, diastolic heart failure, CAD s/p PCI, hypertension, fibromyalgia, and lymphoma presents to ED for evaluation of 24 hours of epigastric to lower midline chest pain that radiates to her back and into the left side of her chest, left neck and left arm.  Describes the pain has a tightness.  Given 324 of 3 doses of nitro with the EMS.  Patient notes pain has improved.  Notes pain has a 4/10 at this time.  Denies any shortness of breath, nausea, vomiting.  She notes she does feel anxious.    Currently denies CP or SOB  Had CP yesterday in the ER. Has been feeling weak for several days  HR 40 with CHB  EKG NSR CHB old inferior Q waves - no acute STT changes    Troponin 0.03  Cr 3.5 baseline 2    Echo 2/15/25    Left Ventricle: The left ventricle is normal in size. There is moderate concentric hypertrophy. There is hyperdynamic systolic function with a visually estimated ejection fraction of 70 - 75%.    Right Ventricle: Normal right ventricular cavity size. Systolic function is normal.    Left Atrium: Left atrium is moderately dilated.    Right Atrium: Right atrium is mildly dilated.    Aortic Valve: There is moderate stenosis. Aortic valve area by VTI is 0.8 cm². Aortic valve peak velocity is 3.4 m/s. Mean gradient is 27 mmHg. The dimensionless index is 0.27.    Mitral Valve:  There is mild stenosis. The mean pressure gradient across the mitral valve is 5 mmHg at a heart rate of 42  bpm. There is mild regurgitation.    Tricuspid Valve: There is moderate regurgitation.    Pulmonary Artery: The estimated pulmonary artery systolic pressure is 49 mmHg.    IVC/SVC: Intermediate venous pressure at 8 mmHg.    Known to me    CAD - JESIKA x 2 to RCA 3/29/19 - JESIKA to RI and Cx 1/8/20, HTN, HLD, DM     11/18/19 [facility] - EDP 16, LAD mid 50%, Cx long mid 80-90% - diffusely diseased - similar to [facility] 3/29/19, OM1 90% mid, RCA stents patent 50% beyond stents     Will review with interventional staff for possible out patient PCI - continue with medical Rx for now     Previously followed by Dr Barbosa - last seen 5/16/19  Patient is here for follow-up of coronary artery disease and ST-elevation MI.  She underwent PCI to the RCA.  She is known to have other blockages well for which she re-presented to the emergency department was admitted for observation for atypical chest pain.  She felt like it was anxiety related and she no longer has had any since discharge.  She underwent nuclear stress test which showed no significant ischemia.  She denies any other associated symptoms currently.  She has experienced no PND, orthopnea or lower extremity edema.  She denies any dizziness, presyncope or syncope.  She says she was given Atarax on discharge which has helped with her anxiety.     Her follow-up coronary artery disease and previous ST-elevation MI.  She is feeling anxious and feels like she has a panic attack today.  She again is somewhat tearful and says that her  is been feeling ill as well.  She says she was started on Prozac by her primary care physician but does not feel like this is doing much in terms of calming her down.  She still has some mild residual chest pain when she mostly feels anxious.  This is dissimilar to her previous angina symptoms.  She denies any other associated symptoms.  She denies  any PND, orthopnea or lower extremity edema.  She denies any dizziness, presyncope or syncope.      [facility] 1/8/20  1.  Successful PCI of proximal ramus with drug-eluting stent x1 (2.0 x 6 mm).  IVUS guidance was utilized for this PCI.  Post PCI IVUS demonstrated well-opposed and expanded stent.  MADINA 3 flow pre and post PCI     2.  Successful PCI of circumflex with drug-eluting stent x1 (2.25 x 22 mm).  MADINA 3 flow pre and post PCI     [facility] 11/18/19  Patent RCA mid stents with 50% lesion after stents  Prox Cx to Dist Cx lesion , 95% stenosed.  Ost 1st Mrg to 1st Mrg lesion , 90% stenosed.  LVEDP (Pre): 16       [facility]: 3-19  Three vessel coronary artery disease.  Successful PCI for acute myocardial infarction of culprit RCA.  Prox RCA lesion , 99% stenosed reduced to 0%..  A STENT RESOLUTE CRISSY [id number] stent was successfully placed at 14 ROGER  Mid RCA lesion , 95% stenosed reduced to 0%..  A STENT RESOLUTE CRISSY 3.0X12MM stent was successfully placed at 12 ROGER  Prox Cx to Dist Cx lesion , 95% stenosed.  Ost 1st Mrg to 1st Mrg lesion , 90% stenosed.  Diffusely diseased LAD which is small vessel as well     Echo 6/20/22  The left ventricle is normal in size with mild concentric hypertrophy and hyperdynamic systolic function.  The estimated ejection fraction is 75%.  Normal right ventricular size with normal right ventricular systolic function.  There is mild aortic valve stenosis.  Aortic valve area is 1.59 cm2; peak velocity is 2.71 m/s; mean gradient is 19 mmHg.  There is moderate mitral stenosis.  The mean diastolic gradient across the mitral valve is 8 mmHg at a heart rate of 90 bpm.  The estimated PA systolic pressure is 58 mmHg.  There is pulmonary hypertension.     Stress test 12/1/22    Normal myocardial perfusion scan. There is no evidence of myocardial ischemia or infarction.    The gated perfusion images showed an ejection fraction of 71% post stress.    The EKG portion of this study is negative for ischemia.     The patient reported no chest pain during the stress test.    There were no arrhythmias during stress.     Holter 7/17/19  Sinus rhythm with heart rates varying between 82 and 136 bpm with an average of 96 bpm.  There were very rare PVCs totalling 21 and averaging 0.44 per hour.  There were very rare PACs totalling 30 and averaging 0.63 per hour.  The diary was returned blank.     Carotid US 5/6/29  There is 20-39% right Internal Carotid Stenosis.  There is 0-19% left Internal Carotid Stenosis.      LE arterial doppler 5/6/19  Hemodynamically significant greater than 70% lesion in the L SFA proximally  Moderate greater than 50% plaque noted in the right SFA  Noncompressible CLEMENT bilaterally     7/3/19 HR has been running in th 100-130 ranges even at rest at rehab  Has on atenolol previously which was switched to metoprolol after MI  Denies significant CP or SOB   sinus tachycardia - old IMI  Increase toprol XL 50 qd  Holter  OV 1 week     9/4/19 Says metoprolol gives her diarrhea - wants to go back to atenolol  Denies CP or SOB  Mild stable claudication     Admitted 11/17/19  Lorena Brennanheladio 69 y.o. female with CAD, HTN, HLD, DM2 presents to the hospital with a chief complaint of chest pain. She reports today at 11am she developed sternal chest pain without radiation that was constant until relieved with nitro in the ED. She states it was not as severe as previous chest pain when she had her heart attack. She is pain free now. She does not experience exertional chest pain and finds she is able to mow her yard without pain. She does not need her nitro PRN at home. She denies fever, SOB, N/V, abdominal pain, dysuria, dizziness, syncope, hemoptysis.      Lorena Brennanheladio 69 y.o. female placed in observation for chest pain. In the ED, EKG without acute ischemia, troponin negative, chest x-ray without acute process. Cardiology consulted. On 11/18/19,no chest pain overnight. LHC via Right fem art  showed  :EDP 16, LAD mid 50%, Cx long mid 80-90% - diffusely diseased - similar to [facility] 3/29/19, OM1 90% mid, RCA stents patent 50% beyond stents. Post procedure, no complications and HDS. Added Imdur and continue to Brilinta/statin. Allergy to ASA. Consider add ACEI/ARBS if blood pressure tolerates. Follow up with Dr. Rico 19 Stopped imdur - worrying about reaction with hives several days after LHC  Still with angina during emotional or physical stress   Doubt she is allergic to imdur - would restart  Increase atenolol 50 bid  Will refer to Dr Monreal to review [facility] for possible PCI  OV with me 3 months     21  recently  - liver failure. Reports stable exertional CP - rarely needs NTG  EKG NSR NSSTT changes  OV 3 months with echo and lexiscan myoview for CP, CAD  Continue Rx for HTN, CAD, HLD      Went to the ER 22  Lorena Contreras is a 71 y.o. female, with a PMHx of CAD, HTN, HLD, DM, Anemia, who presents to the ED with central chest pain onset yesterday. Pt reports chest pain has been constant and is exacerbated by anxiety. Associated symptoms of nausea, diarrhea, LE edema, numbness in all fingers (onset this evening) and numbness in feet (chronic). Pt currently does not feel chest pain. No other exacerbating or alleviating factors. Patient denies cough, fever, chills, SOB, visual disturbance, paresthesia, dysuria, or other associated symptoms. This is the extent of the patient's complaints today in the Emergency Department.       22 Reports left sided CP where her port-a-cath was - feels like a pulling sensation - different from previous angina  BP poorly controlled - admits to eating too much salt  Add diovan 160 qd for HTN  Lexiscan myoview for CP  Echo with EF 75% - mild AS/MS  BMP, BNP  Continue Rx for HTN, CAD, HLD     22 CP has resolved. Denies SOb  Labs not done  BP controlled  Stress test negative for ischemia. Needs labs done    Continue Rx for HTN, CAD, HLD,  DM  OV 3 months     Labs 6/22/23  K 5.2  Cr 1.5 up from 1.2     8/16/23 Diovan stopped due to itching - symptoms resolved. Now having LE edema in the evening and intermittent chest tightness - but able to work in the yard without symptoms  Diuretic stopped by PCP after recent elevated Cr  EKG NSR old IMI         Past Medical History:   Diagnosis Date    Age-related osteoporosis with current pathological fracture with routine healing 11/13/2024    Allergy     Altered mental status 06/19/2022    DYSARTHRIA, SPASTIC MOVEMENTS & DIFFICULTY SWALLOWING    Anemia     Anxiety     Arthritis     Cataract     both removed    Colon polyps     Coronary artery disease     Depression     Diabetes mellitus, type II     Disorder of kidney and ureter     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/2023    Fibromyalgia     Follicular lymphoma     GERD (gastroesophageal reflux disease)     HTN (hypertension)     Hyperlipidemia     MI (myocardial infarction) 03/2019    Personal history of colonic polyps     Restless leg syndrome     Stroke        Past Surgical History:   Procedure Laterality Date    APPLICATION OF WOUND VACUUM-ASSISTED CLOSURE DEVICE Right 9/25/2024    Procedure: APPLICATION, WOUND VAC;  Surgeon: Neto Davalos MD;  Location: [facility] OR [address] [place] [address];  Service: Orthopedics;  Laterality: Right;    COLONOSCOPY  11/07/2012    Colon polyp found; repeat in 5 years    COLONOSCOPY N/A 11/4/2024    Procedure: COLONOSCOPY;  Surgeon: David Castaneda MD;  Location: [address]);  Service: Endoscopy;  Laterality: N/A;    DEBRIDEMENT OF LOCAL FLAP Right 9/25/2024    Procedure: DEBRIDEMENT, LOCAL FLAP;  Surgeon: Neto Davalos MD;  Location: [facility] OR [address] [place] [address];  Service: Orthopedics;  Laterality: Right;    ELBOW SURGERY Right 2015    dislocation repair     ESOPHAGOGASTRODUODENOSCOPY  11/07/2012    atrophic gastritis, H pylori testing negative    INCISION AND DRAINAGE FOOT Right 6/2/2021    Procedure: INCISION AND DRAINAGE,  FOOT, bone biopsy;  Surgeon: Quiana Penn DPM;  Location: [facility];  Service: Podiatry;  Laterality: Right;    IRRIGATION AND DEBRIDEMENT OF LOWER EXTREMITY Right 9/25/2024    Procedure: IRRIGATION AND DEBRIDEMENT, LOWER EXTREMITY; slider table, supine, bone foam, cysto tubing, 6L NS/dakins/peroxide, culture swabs;  Surgeon: Neto Davalos MD;  Location: [facility] OR [address] [place] [address];  Service: Orthopedics;  Laterality: Right;    KNEE SURGERY Bilateral 2015    scoped    LEFT HEART CATHETERIZATION Left 3/29/2019    Procedure: Left heart cath;  Surgeon: Bladimir Barbosa MD;  Location: [facility] CATH LAB;  Service: Cardiology;  Laterality: Left;    LEFT HEART CATHETERIZATION Left 11/18/2019    Procedure: Left heart cath;  Surgeon: Karl Rico MD;  Location: [facility] CATH LAB;  Service: Cardiology;  Laterality: Left;    LEFT HEART CATHETERIZATION Left 1/8/2020    Procedure: Left heart cath, right radial, noon start;  Surgeon: Christos Monreal MD;  Location: [facility] CATH LAB;  Service: Cardiology;  Laterality: Left;  RN Pre Op 1-6-20.  To be admitted 1-7-20 sor Aspirin Disensitation    OPEN REDUCTION AND INTERNAL FIXATION (ORIF) OF FRACTURE OF ACETABULUM Right 8/16/2024    Procedure: ORIF, FRACTURE, ACETABULUM;  Surgeon: Neto Davalos MD;  Location: [address];  Service: Orthopedics;  Laterality: Right;  anterior and lateral pelvic incisions    OPEN REDUCTION AND INTERNAL FIXATION (ORIF) OF PILON FRACTURE Right 8/14/2024    Procedure: ORIF, FRACTURE, PILON;  Surgeon: Neto Davalos MD;  [facility];  Service: Orthopedics;  Laterality: Right;    TONSILLECTOMY  1955    ULTRASOUND GUIDANCE  1/8/2020    Procedure: Ultrasound Guidance;  Surgeon: Christos Monreal MD;  Location: [facility] CATH LAB;  Service: Cardiology;;       Review of patient's allergies indicates:   Allergen Reactions    Novolin 70/30 (semi-synthetic) Nausea And Vomiting     Severe vomiting on Relion 70/30    Sulfa (sulfonamide  antibiotics) Anaphylaxis    Talwin [pentazocine lactate] Anaphylaxis    Victoza [liraglutide] Nausea And Vomiting    Glipizide Nausea Only    Codeine     Influenza virus vaccines Hives    Iodine and iodide containing products Hives    Levetiracetam Itching    Lyrica [pregabalin] Hallucinations    Neurontin [gabapentin]      Possible associated myoclonic jerk    Rituxan [rituximab] Hives    Zoloft [sertraline] Nausea And Vomiting       No current facility-administered medications on file prior to encounter.     Current Outpatient Medications on File Prior to Encounter   Medication Sig    albuterol (PROVENTIL/VENTOLIN HFA) 90 mcg/actuation inhaler Inhale 2 puffs into the lungs every 6 (six) hours as needed for Wheezing or Shortness of Breath.    aluminum & magnesium hydroxide-simethicone (MYLANTA MAX STRENGTH) 400-400-40 mg/5 mL suspension Take 30 mLs by mouth every 6 (six) hours as needed for Indigestion.    aspirin 81 MG Chew Take 1 tablet (81 mg total) by mouth once daily. Hold to avoid bleed risk on triple therapy and then resume on oct 27 once eliquis stops on oct 26th    atorvastatin (LIPITOR) 80 MG tablet Take 1 tablet (80 mg total) by mouth every evening.    DEXCOM G7  Misc Use 1  to track blood glucose, ICD10: E11.65    DEXCOM G7 SENSOR Samina Use 1 sensor every 10 days to track blood glucose, ICD10: E11.65, okay with 90 day supply if possible    FLUoxetine 40 MG capsule Take 1 capsule (40 mg total) by mouth once daily.    furosemide (LASIX) 40 MG tablet Take 1 tablet (40 mg total) by mouth 2 (two) times daily.    hydrALAZINE (APRESOLINE) 100 MG tablet Take 1 tablet (100 mg total) by mouth every 8 (eight) hours.    insulin aspart U-100 (NOVOLOG) 100 unit/mL (3 mL) InPn pen Inject 0-10 Units into the skin before meals and at bedtime as needed (Hyperglycemia).    insulin glargine U-100, Lantus, 100 unit/mL (3 mL) SubQ InPn pen Inject 10 Units into the skin every evening.    isosorbide mononitrate  "(IMDUR) 60 MG 24 hr tablet Take 1 tablet (60 mg total) by mouth once daily.    LORazepam (ATIVAN) 1 MG tablet TAKE 1 TABLET BY MOUTH ONCE DAILY AS NEEDED FOR ANXIETY    meclizine (ANTIVERT) 12.5 mg tablet Take 1 tablet (12.5 mg total) by mouth 2 (two) times daily as needed for Dizziness.    metoprolol succinate (TOPROL-XL) 25 MG 24 hr tablet Take 1 tablet (25 mg total) by mouth once daily.    NOVOLOG FLEXPEN U-100 INSULIN 100 unit/mL (3 mL) InPn pen Inject 10 Units into the skin 3 (three) times daily with meals.    ondansetron (ZOFRAN-ODT) 8 MG TbDL Dissolve 1 tablet (8 mg total) by mouth every 8 (eight) hours as needed (nausea).    pantoprazole (PROTONIX) 40 MG tablet Take 1 tablet (40 mg total) by mouth once daily.    pen needle, diabetic (BD ULTRA-FINE SAGAR PEN NEEDLE) 32 gauge x 5/32" Ndle One pen needle use with insulin pen 4 times a day.  ICD-10: E11.9    QUEtiapine (SEROQUEL) 50 MG tablet Take 1 tablet (50 mg total) by mouth nightly as needed (insomnia).    ticagrelor (BRILINTA) 60 mg tablet Take 1 tablet (60 mg total) by mouth 2 (two) times daily.    tobramycin-dexAMETHasone 0.3-0.1% (TOBRADEX) 0.3-0.1 % DrpS Place 1 drop into the right eye 4 (four) times daily.    triamcinolone acetonide 0.1% (KENALOG) 0.1 % cream Apply topically 2 (two) times daily.     Family History       Problem Relation (Age of Onset)    Allergy (severe) Daughter    Cancer Mother, Father    Diabetes Sister    Heart disease Mother    Hypertension Sister    Lung cancer Brother    No Known Problems Daughter          Tobacco Use    Smoking status: Never    Smokeless tobacco: Never   Substance and Sexual Activity    Alcohol use: Not Currently    Drug use: Never    Sexual activity: Not Currently     Partners: Male     Review of Systems   Constitutional: Negative for decreased appetite.   HENT:  Negative for ear discharge.    Eyes:  Negative for blurred vision.   Respiratory:  Negative for hemoptysis.    Endocrine: Negative for polyphagia. "   Hematologic/Lymphatic: Negative for adenopathy.   Skin:  Negative for color change.   Musculoskeletal:  Negative for joint swelling.   Genitourinary:  Negative for bladder incontinence.   Neurological:  Negative for brief paralysis.   Psychiatric/Behavioral:  Negative for hallucinations.    Allergic/Immunologic: Negative for hives.     Objective:     Vital Signs (Most Recent):  Temp: 97.2 °F (36.2 °C) (02/15/25 0900)  Pulse: (!) 42 (02/15/25 0900)  Resp: 14 (02/15/25 0900)  BP: (!) 183/73 (02/15/25 1036)  SpO2: 100 % (02/15/25 0900) Vital Signs (24h Range):  Temp:  [97.2 °F (36.2 °C)-97.8 °F (36.6 °C)] 97.2 °F (36.2 °C)  Pulse:  [40-45] 42  Resp:  [14-18] 14  SpO2:  [99 %-100 %] 100 %  BP: (114-185)/(55-73) 183/73     Weight: 85.9 kg (189 lb 6 oz)  Body mass index is 27.97 kg/m².    SpO2: 100 %       No intake or output data in the 24 hours ending 02/15/25 1043    Lines/Drains/Airways       Peripheral Intravenous Line  Duration                  Peripheral IV - Single Lumen 02/15/25 0610 20 G Anterior;Right Forearm <1 day         Peripheral IV - Single Lumen 02/15/25 0843 20 G Left Antecubital <1 day                     Physical Exam  Constitutional:       Appearance: She is well-developed.   HENT:      Head: Normocephalic and atraumatic.   Eyes:      Conjunctiva/sclera: Conjunctivae normal.      Pupils: Pupils are equal, round, and reactive to light.   Cardiovascular:      Rate and Rhythm: Bradycardia present.      Pulses: Intact distal pulses.      Heart sounds: Murmur heard.      Harsh midsystolic murmur is present with a grade of 2/6 at the upper right sternal border radiating to the neck.   Pulmonary:      Effort: Pulmonary effort is normal.      Breath sounds: Normal breath sounds.   Abdominal:      General: Bowel sounds are normal.      Palpations: Abdomen is soft.   Musculoskeletal:         General: Normal range of motion.      Cervical back: Normal range of motion and neck supple.   Skin:     General: Skin  is warm and dry.   Neurological:      Mental Status: She is alert and oriented to person, place, and time.          Significant Labs: All pertinent lab results from the last 24 hours have been reviewed.    Significant Imaging: Echocardiogram: 2D echo with color flow doppler: No results found. However, due to the size of the patient record, not all encounters were searched. Please check Results Review for a complete set of results.  Assessment and Plan:     CHB (complete heart block)  New Dx. Hold rate lowering medications. Hold ASA/brilinta. BP adequate no need for TVP at  this point. Will plan PPM Monday if CHB persists. EF normal on echo    Aortic stenosis  AS moderate    Echo  Result Date: 2/15/2025    Left Ventricle: The left ventricle is normal in size. There is moderate   concentric hypertrophy. There is hyperdynamic systolic function with a   visually estimated ejection fraction of 70 - 75%.    Right Ventricle: Normal right ventricular cavity size. Systolic   function is normal.    Left Atrium: Left atrium is moderately dilated.    Right Atrium: Right atrium is mildly dilated.    Aortic Valve: There is moderate stenosis. Aortic valve area by VTI is   0.8 cm². Aortic valve peak velocity is 3.4 m/s. Mean gradient is 27 mmHg.   The dimensionless index is 0.27.    Mitral Valve: There is mild stenosis. The mean pressure gradient across   the mitral valve is 5 mmHg at a heart rate of 42  bpm. There is mild   regurgitation.    Tricuspid Valve: There is moderate regurgitation.    Pulmonary Artery: The estimated pulmonary artery systolic pressure is   49 mmHg.    IVC/SVC: Intermediate venous pressure at 8 mmHg.        Echo  Result Date: 10/31/2024    Left Ventricle: The left ventricle is normal in size. Normal wall   thickness. Normal wall motion. There is normal systolic function with a   visually estimated ejection fraction of 60 - 65%. Grade II diastolic   dysfunction. Elevated left ventricular filling pressure.     Right Ventricle: Normal right ventricular cavity size. Wall thickness   is normal. Systolic function is normal.    Left Atrium: Left atrium is severely dilated.    Aortic Valve: There is moderate aortic valve sclerosis. Moderately   restricted motion. There is moderate stenosis. Aortic valve area by VTI is   1.1 cm2. Aortic valve peak velocity is 3.1 m/s. Mean gradient is 20.1   mmHg. The dimensionless index is 0.37.    Mitral Valve: There is mild regurgitation.    Tricuspid Valve: There is mild regurgitation.    Pulmonary Artery: There is pulmonary hypertension. The estimated   pulmonary artery systolic pressure is 46 mmHg.    IVC/SVC: Normal venous pressure at 3 mmHg.           Coronary artery disease of native artery of native heart with stable angina pectoris  Hx multivessel CAD. Suspect CP is demand ischemia from CHB over ACS.High risk for LHC with Cr 3.5. Will plan repeat ischemic evlaution after PPM if still symptomatic. EF normal on echo    Mixed hyperlipidemia  On statin          VTE Risk Mitigation (From admission, onward)           Ordered     IP VTE HIGH RISK PATIENT  Once         02/15/25 0802     Place sequential compression device  Until discontinued         02/15/25 0802                  35 minutes spent with patient in ICU    Thank you for your consult. I will follow-up with patient. Please contact us if you have any additional questions.    Karl Rico MD  Cardiology   [facility] [place] [facility] Int[facility]

## 2025-02-15 NOTE — NURSING
[facility] [place] 4 [place]      2/15/2025       Skin assessed on: Admit      [x] No Pressure Injuries Present    [x]Prevention Measures Documented    [] Yes LDA  for Pressure Injury Previously documented     [] Yes New Pressure Injury Discovered   [] LDA for New Pressure Injury Added      Attending RN:  Karla Smith RN     Second RN:  aurelio Martinez

## 2025-02-15 NOTE — Clinical Note
Health Maintenance Due   Topic Date Due   • Shingles Vaccine (1 of 2) 01/15/2012   • Lung Cancer Screening  01/15/2017   • Diabetes A1C  06/17/2020       Patient is due for topics as listed above but is not proceeding with Immunization(s) Shingles, Diabetes A1C and Lung Cancer Screening at this time.          The skin at the left upper chest was stapled closed.

## 2025-02-15 NOTE — ASSESSMENT & PLAN NOTE
Patient's FSGs are controlled on current medication regimen.  Last A1c reviewed-   Lab Results   Component Value Date    HGBA1C 5.9 (H) 01/14/2025     Most recent fingerstick glucose reviewed-   Recent Labs   Lab 02/15/25  1234   POCTGLUCOSE 212*     Current correctional scale  Medium  Maintain anti-hyperglycemic dose as follows-   Antihyperglycemics (From admission, onward)      Start     Stop Route Frequency Ordered    02/15/25 2100  insulin glargine U-100 (Lantus) pen 10 Units         -- SubQ Nightly 02/15/25 1257    02/15/25 1358  insulin aspart U-100 pen 0-10 Units         -- SubQ Before meals & nightly PRN 02/15/25 1258          Hold Oral hypoglycemics while patient is in the hospital.; cardiac consistent carbohydrate diet; monitor blood glucose log and titrate to effect

## 2025-02-15 NOTE — H&P
[facility]  History & Physical    Patient Name: Lorena Contreras  MRN: [id number]  Patient Class: IP- Inpatient  Admission Date: 2/15/2025  Attending Physician: Germaine Murray MD   Primary Care Provider: Jorge Paris MD         Patient information was obtained from patient, past medical records, and ER records.     Subjective:     Principal Problem:Complete heart block    Chief Complaint:   Chief Complaint   Patient presents with    Chest Pain     X 24 hours, relieved by 324 ASA and 3 nitro, BIB [facility] 14        HPI: A pleasant  74 yo F with PMHx of  DM2, Fibromyalgia, lymphoma s/p chemo, HTN, HLD, CVA, MI, CAD s/p PCIs  CKD3b, HFpEF, and anemia who presented to the ED with a chief complaint of chest pain radiating to the back with onset a day before presentation.    Pain was said to be sudden in onset; no associated nausea or diaphoresis.  No Preceding PND orthopnea or leg swelling; pain was described as different from her previous coronary events.  Patient however endorsed dizziness but this has been going on for some time.  She denied any syncopal event  Symptoms progress and this prompted patient to come to the ED    Retrospective chart review indicates multiple ED visits for observation for atypical chest pain.  Cardiac history is significant for CAD with JESIKA x 2 to RCA 3/29/19 - JESIKA to RI and Cx 1/8/20      On presentation to the ED; patient was bradycardic to 32 BPM which subsequently improved and mildly hypertensive.  EKG showed complete heart block.  Patient was admitted to the ICU for further management.    Past Medical History:   Diagnosis Date    Age-related osteoporosis with current pathological fracture with routine healing 11/13/2024    Allergy     Altered mental status 06/19/2022    DYSARTHRIA, SPASTIC MOVEMENTS & DIFFICULTY SWALLOWING    Anemia     Anxiety     Arthritis     Cataract     both removed    Colon polyps     Coronary artery disease     Depression      Diabetes mellitus, type II     Disorder of kidney and ureter     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/2023    Fibromyalgia     Follicular lymphoma     GERD (gastroesophageal reflux disease)     HTN (hypertension)     Hyperlipidemia     MI (myocardial infarction) 03/2019    Personal history of colonic polyps     Restless leg syndrome     Stroke        Past Surgical History:   Procedure Laterality Date    APPLICATION OF WOUND VACUUM-ASSISTED CLOSURE DEVICE Right 9/25/2024    Procedure: APPLICATION, WOUND VAC;  Surgeon: Neto Davalos MD;  [facility];  Service: Orthopedics;  Laterality: Right;    COLONOSCOPY  11/07/2012    Colon polyp found; repeat in 5 years    COLONOSCOPY N/A 11/4/2024    Procedure: COLONOSCOPY;  Surgeon: David Castaneda MD;  Location: [facility] (2ND FLR);  Service: Endoscopy;  Laterality: N/A;    DEBRIDEMENT OF LOCAL FLAP Right 9/25/2024    Procedure: DEBRIDEMENT, LOCAL FLAP;  Surgeon: Neto Davalos MD;  [facility];  Service: Orthopedics;  Laterality: Right;    ELBOW SURGERY Right 2015    dislocation repair     ESOPHAGOGASTRODUODENOSCOPY  11/07/2012    atrophic gastritis, H pylori testing negative    INCISION AND DRAINAGE FOOT Right 6/2/2021    Procedure: INCISION AND DRAINAGE, FOOT, bone biopsy;  Surgeon: Quiana Penn DPM;  Location: [facility] OR;  Service: Podiatry;  Laterality: Right;    IRRIGATION AND DEBRIDEMENT OF LOWER EXTREMITY Right 9/25/2024    Procedure: IRRIGATION AND DEBRIDEMENT, LOWER EXTREMITY; slider table, supine, bone foam, cysto tubing, 6L NS/dakins/peroxide, culture swabs;  Surgeon: Neto Davalos MD;  [facility];  Service: Orthopedics;  Laterality: Right;    KNEE SURGERY Bilateral 2015    scoped    LEFT HEART CATHETERIZATION Left 3/29/2019    Procedure: Left heart cath;  Surgeon: Bladimir Barbosa MD;  Location: [facility] CATH LAB;  Service: Cardiology;  Laterality: Left;    LEFT HEART CATHETERIZATION  Left 11/18/2019    Procedure: Left heart cath;  Surgeon: Karl Rico MD;  Location: [facility] CATH LAB;  Service: Cardiology;  Laterality: Left;    LEFT HEART CATHETERIZATION Left 1/8/2020    Procedure: Left heart cath, right radial, noon start;  Surgeon: Christos Monreal MD;  Location: [facility] CATH LAB;  Service: Cardiology;  Laterality: Left;  RN Pre Op 1-6-20.  To be admitted 1-7-20 sor Aspirin Disensitation    OPEN REDUCTION AND INTERNAL FIXATION (ORIF) OF FRACTURE OF ACETABULUM Right 8/16/2024    Procedure: ORIF, FRACTURE, ACETABULUM;  Surgeon: Neto Davalos MD;  [facility];  Service: Orthopedics;  Laterality: Right;  anterior and lateral pelvic incisions    OPEN REDUCTION AND INTERNAL FIXATION (ORIF) OF PILON FRACTURE Right 8/14/2024    Procedure: ORIF, FRACTURE, PILON;  Surgeon: Neto Davalos MD;  [facility];  Service: Orthopedics;  Laterality: Right;    TONSILLECTOMY  1955    ULTRASOUND GUIDANCE  1/8/2020    Procedure: Ultrasound Guidance;  Surgeon: Christos Monreal MD;  Location: [facility] CATH LAB;  Service: Cardiology;;       Review of patient's allergies indicates:   Allergen Reactions    Novolin 70/30 (semi-synthetic) Nausea And Vomiting     Severe vomiting on Relion 70/30    Sulfa (sulfonamide antibiotics) Anaphylaxis    Talwin [pentazocine lactate] Anaphylaxis    Victoza [liraglutide] Nausea And Vomiting    Glipizide Nausea Only    Codeine     Influenza virus vaccines Hives    Iodine and iodide containing products Hives    Levetiracetam Itching    Lyrica [pregabalin] Hallucinations    Neurontin [gabapentin]      Possible associated myoclonic jerk    Rituxan [rituximab] Hives    Zoloft [sertraline] Nausea And Vomiting       No current facility-administered medications on file prior to encounter.     Current Outpatient Medications on File Prior to Encounter   Medication Sig    albuterol (PROVENTIL/VENTOLIN HFA) 90 mcg/actuation inhaler Inhale 2 puffs into the  lungs every 6 (six) hours as needed for Wheezing or Shortness of Breath.    aluminum & magnesium hydroxide-simethicone (MYLANTA MAX STRENGTH) 400-400-40 mg/5 mL suspension Take 30 mLs by mouth every 6 (six) hours as needed for Indigestion.    aspirin 81 MG Chew Take 1 tablet (81 mg total) by mouth once daily. Hold to avoid bleed risk on triple therapy and then resume on oct 27 once eliquis stops on oct 26th    atorvastatin (LIPITOR) 80 MG tablet Take 1 tablet (80 mg total) by mouth every evening.    DEXCOM G7  Misc Use 1  to track blood glucose, ICD10: E11.65    DEXCOM G7 SENSOR Samina Use 1 sensor every 10 days to track blood glucose, ICD10: E11.65, okay with 90 day supply if possible    FLUoxetine 40 MG capsule Take 1 capsule (40 mg total) by mouth once daily.    furosemide (LASIX) 40 MG tablet Take 1 tablet (40 mg total) by mouth 2 (two) times daily.    hydrALAZINE (APRESOLINE) 100 MG tablet Take 1 tablet (100 mg total) by mouth every 8 (eight) hours.    insulin aspart U-100 (NOVOLOG) 100 unit/mL (3 mL) InPn pen Inject 0-10 Units into the skin before meals and at bedtime as needed (Hyperglycemia).    insulin glargine U-100, Lantus, 100 unit/mL (3 mL) SubQ InPn pen Inject 10 Units into the skin every evening.    isosorbide mononitrate (IMDUR) 60 MG 24 hr tablet Take 1 tablet (60 mg total) by mouth once daily.    LORazepam (ATIVAN) 1 MG tablet TAKE 1 TABLET BY MOUTH ONCE DAILY AS NEEDED FOR ANXIETY    meclizine (ANTIVERT) 12.5 mg tablet Take 1 tablet (12.5 mg total) by mouth 2 (two) times daily as needed for Dizziness.    metoprolol succinate (TOPROL-XL) 25 MG 24 hr tablet Take 1 tablet (25 mg total) by mouth once daily.    NOVOLOG FLEXPEN U-100 INSULIN 100 unit/mL (3 mL) InPn pen Inject 10 Units into the skin 3 (three) times daily with meals.    ondansetron (ZOFRAN-ODT) 8 MG TbDL Dissolve 1 tablet (8 mg total) by mouth every 8 (eight) hours as needed (nausea).    pantoprazole (PROTONIX) 40 MG tablet  "Take 1 tablet (40 mg total) by mouth once daily.    pen needle, diabetic (BD ULTRA-FINE SAGAR PEN NEEDLE) 32 gauge x 5/32" Ndle One pen needle use with insulin pen 4 times a day.  ICD-10: E11.9    QUEtiapine (SEROQUEL) 50 MG tablet Take 1 tablet (50 mg total) by mouth nightly as needed (insomnia).    ticagrelor (BRILINTA) 60 mg tablet Take 1 tablet (60 mg total) by mouth 2 (two) times daily.    tobramycin-dexAMETHasone 0.3-0.1% (TOBRADEX) 0.3-0.1 % DrpS Place 1 drop into the right eye 4 (four) times daily.    triamcinolone acetonide 0.1% (KENALOG) 0.1 % cream Apply topically 2 (two) times daily.     Family History       Problem Relation (Age of Onset)    Allergy (severe) Daughter    Cancer Mother, Father    Diabetes Sister    Heart disease Mother    Hypertension Sister    Lung cancer Brother    No Known Problems Daughter          Tobacco Use    Smoking status: Never    Smokeless tobacco: Never   Substance and Sexual Activity    Alcohol use: Not Currently    Drug use: Never    Sexual activity: Not Currently     Partners: Male     Review of Systems   Constitutional:  Negative for chills, diaphoresis and fatigue.   Respiratory:  Negative for cough, choking and shortness of breath.    Cardiovascular:  Positive for chest pain. Negative for palpitations and leg swelling.   Gastrointestinal:  Negative for abdominal distention, abdominal pain and anal bleeding.   Neurological:  Positive for dizziness.     Objective:     Vital Signs (Most Recent):  Temp: 98.4 °F (36.9 °C) (02/15/25 1200)  Pulse: (!) 43 (02/15/25 1500)  Resp: (!) 23 (02/15/25 1500)  BP: (!) 118/55 (02/15/25 1500)  SpO2: 98 % (02/15/25 1500) Vital Signs (24h Range):  Temp:  [97.2 °F (36.2 °C)-98.4 °F (36.9 °C)] 98.4 °F (36.9 °C)  Pulse:  [40-45] 43  Resp:  [14-48] 23  SpO2:  [98 %-100 %] 98 %  BP: (114-185)/(55-73) 118/55     Weight: 85.9 kg (189 lb 6 oz)  Body mass index is 27.97 kg/m².     Physical Exam  Constitutional:       General: She is not in acute " distress.     Appearance: Normal appearance. She is not ill-appearing.   Cardiovascular:      Rate and Rhythm: Bradycardia present.      Pulses: Normal pulses.      Heart sounds: Normal heart sounds. No murmur heard.     No friction rub. No gallop.   Pulmonary:      Effort: Pulmonary effort is normal. No respiratory distress.      Breath sounds: Normal breath sounds. No stridor.   Abdominal:      General: Abdomen is flat. There is no distension.      Palpations: Abdomen is soft. There is no mass.   Neurological:      General: No focal deficit present.      Mental Status: She is alert and oriented to person, place, and time.                Significant Labs: CBC:   Recent Labs   Lab 02/15/25  0611   WBC 9.24   HGB 10.5*   HCT 32.2*        CMP:   Recent Labs   Lab 02/15/25  0611      K 4.3      CO2 15*   *   BUN 83*   CREATININE 3.5*   CALCIUM 8.8   PROT 7.4   ALBUMIN 3.3*   BILITOT 0.2   ALKPHOS 217*   AST 44*   ALT 42   ANIONGAP 14       Significant Imaging: I have reviewed all pertinent imaging results/findings within the past 24 hours.  Assessment/Plan:     * Complete heart block  Hx of chest pain on presentation; different from previous anginal pains  EKG showed complete heart block  Troponin not elevated  Discontinue all AV frances blocking agents  Monitor patient on telemetry  NPO after midnight on Sunday; for ppm on 2/17      Diabetes mellitus  Patient's FSGs are controlled on current medication regimen.  Last A1c reviewed-   Lab Results   Component Value Date    HGBA1C 5.9 (H) 01/14/2025     Most recent fingerstick glucose reviewed-   Recent Labs   Lab 02/15/25  1234   POCTGLUCOSE 212*     Current correctional scale  Medium  Maintain anti-hyperglycemic dose as follows-   Antihyperglycemics (From admission, onward)      Start     Stop Route Frequency Ordered    02/15/25 2100  insulin glargine U-100 (Lantus) pen 10 Units         -- SubQ Nightly 02/15/25 1257    02/15/25 1358  insulin  aspart U-100 pen 0-10 Units         -- SubQ Before meals & nightly PRN 02/15/25 1258          Hold Oral hypoglycemics while patient is in the hospital.; cardiac consistent carbohydrate diet; monitor blood glucose log and titrate to effect    Stage 3b chronic kidney disease  Creatine stable for now. BMP reviewed- noted Estimated Creatinine Clearance: 16.5 mL/min (A) (based on SCr of 3.5 mg/dL (H)). according to latest data. Based on current GFR, CKD stage is stage 4 - GFR 15-29.  Monitor UOP and serial BMP and adjust therapy as needed. Renally dose meds. Avoid nephrotoxic medications and procedures.    Aortic stenosis  Echocardiogram with evidence of aortic stenosis that is moderate . The patient's most recent echocardiogram result is listed below. We will manage the valvular abnormality by avoiding volume overload; and cardiology follow up as an outpatient    Echo  Result Date: 2/15/2025    Left Ventricle: The left ventricle is normal in size. There is moderate   concentric hypertrophy. There is hyperdynamic systolic function with a   visually estimated ejection fraction of 70 - 75%.    Right Ventricle: Normal right ventricular cavity size. Systolic   function is normal.    Left Atrium: Left atrium is moderately dilated.    Right Atrium: Right atrium is mildly dilated.    Aortic Valve: There is moderate stenosis. Aortic valve area by VTI is   0.8 cm². Aortic valve peak velocity is 3.4 m/s. Mean gradient is 27 mmHg.   The dimensionless index is 0.27.    Mitral Valve: There is mild stenosis. The mean pressure gradient across   the mitral valve is 5 mmHg at a heart rate of 42  bpm. There is mild   regurgitation.    Tricuspid Valve: There is moderate regurgitation.    Pulmonary Artery: The estimated pulmonary artery systolic pressure is   49 mmHg.    IVC/SVC: Intermediate venous pressure at 8 mmHg.        Echo  Result Date: 10/31/2024    Left Ventricle: The left ventricle is normal in size. Normal wall   thickness.  Normal wall motion. There is normal systolic function with a   visually estimated ejection fraction of 60 - 65%. Grade II diastolic   dysfunction. Elevated left ventricular filling pressure.    Right Ventricle: Normal right ventricular cavity size. Wall thickness   is normal. Systolic function is normal.    Left Atrium: Left atrium is severely dilated.    Aortic Valve: There is moderate aortic valve sclerosis. Moderately   restricted motion. There is moderate stenosis. Aortic valve area by VTI is   1.1 cm2. Aortic valve peak velocity is 3.1 m/s. Mean gradient is 20.1   mmHg. The dimensionless index is 0.37.    Mitral Valve: There is mild regurgitation.    Tricuspid Valve: There is mild regurgitation.    Pulmonary Artery: There is pulmonary hypertension. The estimated   pulmonary artery systolic pressure is 46 mmHg.    IVC/SVC: Normal venous pressure at 3 mmHg.           Coronary artery disease of native artery of native heart with stable angina pectoris  Patient with known CAD s/p stent placement and CABG, which is controlled Will continue Statin; hold aspirin and Plavix setting of impending pacing and monitor for S/Sx of angina/ACS. Continue to monitor on telemetry.   -repeat ischemic evaluation of after PPM    Mixed hyperlipidemia  Continue home statin        VTE Risk Mitigation (From admission, onward)           Ordered     IP VTE HIGH RISK PATIENT  Once         02/15/25 1252     Place sequential compression device  Until discontinued         02/15/25 1252     Place sequential compression device  Until discontinued         02/15/25 0802                  Critical care time spent on the evaluation and treatment of severe organ dysfunction, review of pertinent labs and imaging studies, discussions with consulting providers and discussions with patient/family: more than30 minutes.                  Germaine Murray MD  Department of Hospital Medicine  [facility] - [place], [place] - Intensive Care

## 2025-02-15 NOTE — Clinical Note
The lead was inserted under fluorscopic guidance, thresholds were tested, was sutured in place and was secured in place. The lead was inserted in the right atrium.

## 2025-02-15 NOTE — SUBJECTIVE & OBJECTIVE
Past Medical History:   Diagnosis Date    Age-related osteoporosis with current pathological fracture with routine healing 11/13/2024    Allergy     Altered mental status 06/19/2022    DYSARTHRIA, SPASTIC MOVEMENTS & DIFFICULTY SWALLOWING    Anemia     Anxiety     Arthritis     Cataract     both removed    Colon polyps     Coronary artery disease     Depression     Diabetes mellitus, type II     Disorder of kidney and ureter     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/2023    Fibromyalgia     Follicular lymphoma     GERD (gastroesophageal reflux disease)     HTN (hypertension)     Hyperlipidemia     MI (myocardial infarction) 03/2019    Personal history of colonic polyps     Restless leg syndrome     Stroke        Past Surgical History:   Procedure Laterality Date    APPLICATION OF WOUND VACUUM-ASSISTED CLOSURE DEVICE Right 9/25/2024    Procedure: APPLICATION, WOUND VAC;  Surgeon: Neto Davalos MD;  [facility];  Service: Orthopedics;  Laterality: Right;    COLONOSCOPY  11/07/2012    Colon polyp found; repeat in 5 years    COLONOSCOPY N/A 11/4/2024    Procedure: COLONOSCOPY;  Surgeon: David Castaneda MD;  Location: [facility] (2ND FLR);  Service: Endoscopy;  Laterality: N/A;    DEBRIDEMENT OF LOCAL FLAP Right 9/25/2024    Procedure: DEBRIDEMENT, LOCAL FLAP;  Surgeon: Neto Davalos MD;  Location: [facility] OR [facility] FLR;  Service: Orthopedics;  Laterality: Right;    ELBOW SURGERY Right 2015    dislocation repair     ESOPHAGOGASTRODUODENOSCOPY  11/07/2012    atrophic gastritis, H pylori testing negative    INCISION AND DRAINAGE FOOT Right 6/2/2021    Procedure: INCISION AND DRAINAGE, FOOT, bone biopsy;  Surgeon: Quiana Penn DPM;  Location: [facility] OR;  Service: Podiatry;  Laterality: Right;    IRRIGATION AND DEBRIDEMENT OF LOWER EXTREMITY Right 9/25/2024    Procedure: IRRIGATION AND DEBRIDEMENT, LOWER EXTREMITY; slider table, supine, bone foam, cysto tubing, 6L NS/dakins/peroxide, culture  swabs;  Surgeon: Neto Davalos MD;  [facility];  Service: Orthopedics;  Laterality: Right;    KNEE SURGERY Bilateral 2015    scoped    LEFT HEART CATHETERIZATION Left 3/29/2019    Procedure: Left heart cath;  Surgeon: Bladimir Barbosa MD;  Location: [facility] CATH LAB;  Service: Cardiology;  Laterality: Left;    LEFT HEART CATHETERIZATION Left 11/18/2019    Procedure: Left heart cath;  Surgeon: Karl Rico MD;  Location: [facility] CATH LAB;  Service: Cardiology;  Laterality: Left;    LEFT HEART CATHETERIZATION Left 1/8/2020    Procedure: Left heart cath, right radial, noon start;  Surgeon: Christos Monreal MD;  Location: [facility] CATH LAB;  Service: Cardiology;  Laterality: Left;  RN Pre Op 1-6-20.  To be admitted 1-7-20 sor Aspirin Disensitation    OPEN REDUCTION AND INTERNAL FIXATION (ORIF) OF FRACTURE OF ACETABULUM Right 8/16/2024    Procedure: ORIF, FRACTURE, ACETABULUM;  Surgeon: Neto Davalos MD;  Location: [address];  Service: Orthopedics;  Laterality: Right;  anterior and lateral pelvic incisions    OPEN REDUCTION AND INTERNAL FIXATION (ORIF) OF PILON FRACTURE Right 8/14/2024    Procedure: ORIF, FRACTURE, PILON;  Surgeon: Neto Davalos MD;  Location: [address];  Service: Orthopedics;  Laterality: Right;    TONSILLECTOMY  1955    ULTRASOUND GUIDANCE  1/8/2020    Procedure: Ultrasound Guidance;  Surgeon: Christos Monreal MD;  Location: [facility] CATH LAB;  Service: Cardiology;;       Review of patient's allergies indicates:   Allergen Reactions    Novolin 70/30 (semi-synthetic) Nausea And Vomiting     Severe vomiting on Relion 70/30    Sulfa (sulfonamide antibiotics) Anaphylaxis    Talwin [pentazocine lactate] Anaphylaxis    Victoza [liraglutide] Nausea And Vomiting    Glipizide Nausea Only    Codeine     Influenza virus vaccines Hives    Iodine and iodide containing products Hives    Levetiracetam Itching    Lyrica [pregabalin] Hallucinations    Neurontin  [gabapentin]      Possible associated myoclonic jerk    Rituxan [rituximab] Hives    Zoloft [sertraline] Nausea And Vomiting       No current facility-administered medications on file prior to encounter.     Current Outpatient Medications on File Prior to Encounter   Medication Sig    albuterol (PROVENTIL/VENTOLIN HFA) 90 mcg/actuation inhaler Inhale 2 puffs into the lungs every 6 (six) hours as needed for Wheezing or Shortness of Breath.    aluminum & magnesium hydroxide-simethicone (MYLANTA MAX STRENGTH) 400-400-40 mg/5 mL suspension Take 30 mLs by mouth every 6 (six) hours as needed for Indigestion.    aspirin 81 MG Chew Take 1 tablet (81 mg total) by mouth once daily. Hold to avoid bleed risk on triple therapy and then resume on oct 27 once eliquis stops on oct 26th    atorvastatin (LIPITOR) 80 MG tablet Take 1 tablet (80 mg total) by mouth every evening.    DEXCOM G7  Misc Use 1  to track blood glucose, ICD10: E11.65    DEXCOM G7 SENSOR Samina Use 1 sensor every 10 days to track blood glucose, ICD10: E11.65, okay with 90 day supply if possible    FLUoxetine 40 MG capsule Take 1 capsule (40 mg total) by mouth once daily.    furosemide (LASIX) 40 MG tablet Take 1 tablet (40 mg total) by mouth 2 (two) times daily.    hydrALAZINE (APRESOLINE) 100 MG tablet Take 1 tablet (100 mg total) by mouth every 8 (eight) hours.    insulin aspart U-100 (NOVOLOG) 100 unit/mL (3 mL) InPn pen Inject 0-10 Units into the skin before meals and at bedtime as needed (Hyperglycemia).    insulin glargine U-100, Lantus, 100 unit/mL (3 mL) SubQ InPn pen Inject 10 Units into the skin every evening.    isosorbide mononitrate (IMDUR) 60 MG 24 hr tablet Take 1 tablet (60 mg total) by mouth once daily.    LORazepam (ATIVAN) 1 MG tablet TAKE 1 TABLET BY MOUTH ONCE DAILY AS NEEDED FOR ANXIETY    meclizine (ANTIVERT) 12.5 mg tablet Take 1 tablet (12.5 mg total) by mouth 2 (two) times daily as needed for Dizziness.    metoprolol  "succinate (TOPROL-XL) 25 MG 24 hr tablet Take 1 tablet (25 mg total) by mouth once daily.    NOVOLOG FLEXPEN U-100 INSULIN 100 unit/mL (3 mL) InPn pen Inject 10 Units into the skin 3 (three) times daily with meals.    ondansetron (ZOFRAN-ODT) 8 MG TbDL Dissolve 1 tablet (8 mg total) by mouth every 8 (eight) hours as needed (nausea).    pantoprazole (PROTONIX) 40 MG tablet Take 1 tablet (40 mg total) by mouth once daily.    pen needle, diabetic (BD ULTRA-FINE SAGAR PEN NEEDLE) 32 gauge x 5/32" Ndle One pen needle use with insulin pen 4 times a day.  ICD-10: E11.9    QUEtiapine (SEROQUEL) 50 MG tablet Take 1 tablet (50 mg total) by mouth nightly as needed (insomnia).    ticagrelor (BRILINTA) 60 mg tablet Take 1 tablet (60 mg total) by mouth 2 (two) times daily.    tobramycin-dexAMETHasone 0.3-0.1% (TOBRADEX) 0.3-0.1 % DrpS Place 1 drop into the right eye 4 (four) times daily.    triamcinolone acetonide 0.1% (KENALOG) 0.1 % cream Apply topically 2 (two) times daily.     Family History       Problem Relation (Age of Onset)    Allergy (severe) Daughter    Cancer Mother, Father    Diabetes Sister    Heart disease Mother    Hypertension Sister    Lung cancer Brother    No Known Problems Daughter          Tobacco Use    Smoking status: Never    Smokeless tobacco: Never   Substance and Sexual Activity    Alcohol use: Not Currently    Drug use: Never    Sexual activity: Not Currently     Partners: Male     Review of Systems   Constitutional: Negative for decreased appetite.   HENT:  Negative for ear discharge.    Eyes:  Negative for blurred vision.   Respiratory:  Negative for hemoptysis.    Endocrine: Negative for polyphagia.   Hematologic/Lymphatic: Negative for adenopathy.   Skin:  Negative for color change.   Musculoskeletal:  Negative for joint swelling.   Genitourinary:  Negative for bladder incontinence.   Neurological:  Negative for brief paralysis.   Psychiatric/Behavioral:  Negative for hallucinations.  "   Allergic/Immunologic: Negative for hives.     Objective:     Vital Signs (Most Recent):  Temp: 97.2 °F (36.2 °C) (02/15/25 0900)  Pulse: (!) 42 (02/15/25 0900)  Resp: 14 (02/15/25 0900)  BP: (!) 183/73 (02/15/25 1036)  SpO2: 100 % (02/15/25 0900) Vital Signs (24h Range):  Temp:  [97.2 °F (36.2 °C)-97.8 °F (36.6 °C)] 97.2 °F (36.2 °C)  Pulse:  [40-45] 42  Resp:  [14-18] 14  SpO2:  [99 %-100 %] 100 %  BP: (114-185)/(55-73) 183/73     Weight: 85.9 kg (189 lb 6 oz)  Body mass index is 27.97 kg/m².    SpO2: 100 %       No intake or output data in the 24 hours ending 02/15/25 1043    Lines/Drains/Airways       Peripheral Intravenous Line  Duration                  Peripheral IV - Single Lumen 02/15/25 0610 20 G Anterior;Right Forearm <1 day         Peripheral IV - Single Lumen 02/15/25 0843 20 G Left Antecubital <1 day                     Physical Exam  Constitutional:       Appearance: She is well-developed.   HENT:      Head: Normocephalic and atraumatic.   Eyes:      Conjunctiva/sclera: Conjunctivae normal.      Pupils: Pupils are equal, round, and reactive to light.   Cardiovascular:      Rate and Rhythm: Bradycardia present.      Pulses: Intact distal pulses.      Heart sounds: Murmur heard.      Harsh midsystolic murmur is present with a grade of 2/6 at the upper right sternal border radiating to the neck.   Pulmonary:      Effort: Pulmonary effort is normal.      Breath sounds: Normal breath sounds.   Abdominal:      General: Bowel sounds are normal.      Palpations: Abdomen is soft.   Musculoskeletal:         General: Normal range of motion.      Cervical back: Normal range of motion and neck supple.   Skin:     General: Skin is warm and dry.   Neurological:      Mental Status: She is alert and oriented to person, place, and time.          Significant Labs: All pertinent lab results from the last 24 hours have been reviewed.    Significant Imaging: Echocardiogram: 2D echo with color flow doppler: No results  found. However, due to the size of the patient record, not all encounters were searched. Please check Results Review for a complete set of results.

## 2025-02-15 NOTE — ASSESSMENT & PLAN NOTE
New Dx. Hold rate lowering medications. Hold ASA/brilinta. BP adequate no need for TVP at  this point. Will plan PPM Monday if CHB persists. EF normal on echo

## 2025-02-15 NOTE — ASSESSMENT & PLAN NOTE
Echocardiogram with evidence of aortic stenosis that is moderate . The patient's most recent echocardiogram result is listed below. We will manage the valvular abnormality by avoiding volume overload; and cardiology follow up as an outpatient    Echo  Result Date: 2/15/2025    Left Ventricle: The left ventricle is normal in size. There is moderate   concentric hypertrophy. There is hyperdynamic systolic function with a   visually estimated ejection fraction of 70 - 75%.    Right Ventricle: Normal right ventricular cavity size. Systolic   function is normal.    Left Atrium: Left atrium is moderately dilated.    Right Atrium: Right atrium is mildly dilated.    Aortic Valve: There is moderate stenosis. Aortic valve area by VTI is   0.8 cm². Aortic valve peak velocity is 3.4 m/s. Mean gradient is 27 mmHg.   The dimensionless index is 0.27.    Mitral Valve: There is mild stenosis. The mean pressure gradient across   the mitral valve is 5 mmHg at a heart rate of 42  bpm. There is mild   regurgitation.    Tricuspid Valve: There is moderate regurgitation.    Pulmonary Artery: The estimated pulmonary artery systolic pressure is   49 mmHg.    IVC/SVC: Intermediate venous pressure at 8 mmHg.        Echo  Result Date: 10/31/2024    Left Ventricle: The left ventricle is normal in size. Normal wall   thickness. Normal wall motion. There is normal systolic function with a   visually estimated ejection fraction of 60 - 65%. Grade II diastolic   dysfunction. Elevated left ventricular filling pressure.    Right Ventricle: Normal right ventricular cavity size. Wall thickness   is normal. Systolic function is normal.    Left Atrium: Left atrium is severely dilated.    Aortic Valve: There is moderate aortic valve sclerosis. Moderately   restricted motion. There is moderate stenosis. Aortic valve area by VTI is   1.1 cm2. Aortic valve peak velocity is 3.1 m/s. Mean gradient is 20.1   mmHg. The dimensionless index is 0.37.    Mitral  Valve: There is mild regurgitation.    Tricuspid Valve: There is mild regurgitation.    Pulmonary Artery: There is pulmonary hypertension. The estimated   pulmonary artery systolic pressure is 46 mmHg.    IVC/SVC: Normal venous pressure at 3 mmHg.

## 2025-02-15 NOTE — SUBJECTIVE & OBJECTIVE
Past Medical History:   Diagnosis Date    Age-related osteoporosis with current pathological fracture with routine healing 11/13/2024    Allergy     Altered mental status 06/19/2022    DYSARTHRIA, SPASTIC MOVEMENTS & DIFFICULTY SWALLOWING    Anemia     Anxiety     Arthritis     Cataract     both removed    Colon polyps     Coronary artery disease     Depression     Diabetes mellitus, type II     Disorder of kidney and ureter     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/2023    Fibromyalgia     Follicular lymphoma     GERD (gastroesophageal reflux disease)     HTN (hypertension)     Hyperlipidemia     MI (myocardial infarction) 03/2019    Personal history of colonic polyps     Restless leg syndrome     Stroke        Past Surgical History:   Procedure Laterality Date    APPLICATION OF WOUND VACUUM-ASSISTED CLOSURE DEVICE Right 9/25/2024    Procedure: APPLICATION, WOUND VAC;  Surgeon: Neto Davalos MD;  [facility];  Service: Orthopedics;  Laterality: Right;    COLONOSCOPY  11/07/2012    Colon polyp found; repeat in 5 years    COLONOSCOPY N/A 11/4/2024    Procedure: COLONOSCOPY;  Surgeon: David Castaneda MD;  Location: [facility] (2ND FLR);  Service: Endoscopy;  Laterality: N/A;    DEBRIDEMENT OF LOCAL FLAP Right 9/25/2024    Procedure: DEBRIDEMENT, LOCAL FLAP;  Surgeon: Neto Davalos MD;  Location: [facility] OR [facility] FLR;  Service: Orthopedics;  Laterality: Right;    ELBOW SURGERY Right 2015    dislocation repair     ESOPHAGOGASTRODUODENOSCOPY  11/07/2012    atrophic gastritis, H pylori testing negative    INCISION AND DRAINAGE FOOT Right 6/2/2021    Procedure: INCISION AND DRAINAGE, FOOT, bone biopsy;  Surgeon: Quiana Penn DPM;  Location: [facility] OR;  Service: Podiatry;  Laterality: Right;    IRRIGATION AND DEBRIDEMENT OF LOWER EXTREMITY Right 9/25/2024    Procedure: IRRIGATION AND DEBRIDEMENT, LOWER EXTREMITY; slider table, supine, bone foam, cysto tubing, 6L NS/dakins/peroxide, culture  swabs;  Surgeon: Neto Davalos MD;  [facility];  Service: Orthopedics;  Laterality: Right;    KNEE SURGERY Bilateral 2015    scoped    LEFT HEART CATHETERIZATION Left 3/29/2019    Procedure: Left heart cath;  Surgeon: Bladimir Barbosa MD;  Location: [facility] CATH LAB;  Service: Cardiology;  Laterality: Left;    LEFT HEART CATHETERIZATION Left 11/18/2019    Procedure: Left heart cath;  Surgeon: Karl Rico MD;  Location: [facility] CATH LAB;  Service: Cardiology;  Laterality: Left;    LEFT HEART CATHETERIZATION Left 1/8/2020    Procedure: Left heart cath, right radial, noon start;  Surgeon: Christos Monreal MD;  Location: [facility] CATH LAB;  Service: Cardiology;  Laterality: Left;  RN Pre Op 1-6-20.  To be admitted 1-7-20 sor Aspirin Disensitation    OPEN REDUCTION AND INTERNAL FIXATION (ORIF) OF FRACTURE OF ACETABULUM Right 8/16/2024    Procedure: ORIF, FRACTURE, ACETABULUM;  Surgeon: Neto Davalos MD;  Location: [address];  Service: Orthopedics;  Laterality: Right;  anterior and lateral pelvic incisions    OPEN REDUCTION AND INTERNAL FIXATION (ORIF) OF PILON FRACTURE Right 8/14/2024    Procedure: ORIF, FRACTURE, PILON;  Surgeon: Neto Davalos MD;  Location: [address];  Service: Orthopedics;  Laterality: Right;    TONSILLECTOMY  1955    ULTRASOUND GUIDANCE  1/8/2020    Procedure: Ultrasound Guidance;  Surgeon: Christos Monreal MD;  Location: [facility] CATH LAB;  Service: Cardiology;;       Review of patient's allergies indicates:   Allergen Reactions    Novolin 70/30 (semi-synthetic) Nausea And Vomiting     Severe vomiting on Relion 70/30    Sulfa (sulfonamide antibiotics) Anaphylaxis    Talwin [pentazocine lactate] Anaphylaxis    Victoza [liraglutide] Nausea And Vomiting    Glipizide Nausea Only    Codeine     Influenza virus vaccines Hives    Iodine and iodide containing products Hives    Levetiracetam Itching    Lyrica [pregabalin] Hallucinations    Neurontin  [gabapentin]      Possible associated myoclonic jerk    Rituxan [rituximab] Hives    Zoloft [sertraline] Nausea And Vomiting       No current facility-administered medications on file prior to encounter.     Current Outpatient Medications on File Prior to Encounter   Medication Sig    albuterol (PROVENTIL/VENTOLIN HFA) 90 mcg/actuation inhaler Inhale 2 puffs into the lungs every 6 (six) hours as needed for Wheezing or Shortness of Breath.    aluminum & magnesium hydroxide-simethicone (MYLANTA MAX STRENGTH) 400-400-40 mg/5 mL suspension Take 30 mLs by mouth every 6 (six) hours as needed for Indigestion.    aspirin 81 MG Chew Take 1 tablet (81 mg total) by mouth once daily. Hold to avoid bleed risk on triple therapy and then resume on oct 27 once eliquis stops on oct 26th    atorvastatin (LIPITOR) 80 MG tablet Take 1 tablet (80 mg total) by mouth every evening.    DEXCOM G7  Misc Use 1  to track blood glucose, ICD10: E11.65    DEXCOM G7 SENSOR Samina Use 1 sensor every 10 days to track blood glucose, ICD10: E11.65, okay with 90 day supply if possible    FLUoxetine 40 MG capsule Take 1 capsule (40 mg total) by mouth once daily.    furosemide (LASIX) 40 MG tablet Take 1 tablet (40 mg total) by mouth 2 (two) times daily.    hydrALAZINE (APRESOLINE) 100 MG tablet Take 1 tablet (100 mg total) by mouth every 8 (eight) hours.    insulin aspart U-100 (NOVOLOG) 100 unit/mL (3 mL) InPn pen Inject 0-10 Units into the skin before meals and at bedtime as needed (Hyperglycemia).    insulin glargine U-100, Lantus, 100 unit/mL (3 mL) SubQ InPn pen Inject 10 Units into the skin every evening.    isosorbide mononitrate (IMDUR) 60 MG 24 hr tablet Take 1 tablet (60 mg total) by mouth once daily.    LORazepam (ATIVAN) 1 MG tablet TAKE 1 TABLET BY MOUTH ONCE DAILY AS NEEDED FOR ANXIETY    meclizine (ANTIVERT) 12.5 mg tablet Take 1 tablet (12.5 mg total) by mouth 2 (two) times daily as needed for Dizziness.    metoprolol  "succinate (TOPROL-XL) 25 MG 24 hr tablet Take 1 tablet (25 mg total) by mouth once daily.    NOVOLOG FLEXPEN U-100 INSULIN 100 unit/mL (3 mL) InPn pen Inject 10 Units into the skin 3 (three) times daily with meals.    ondansetron (ZOFRAN-ODT) 8 MG TbDL Dissolve 1 tablet (8 mg total) by mouth every 8 (eight) hours as needed (nausea).    pantoprazole (PROTONIX) 40 MG tablet Take 1 tablet (40 mg total) by mouth once daily.    pen needle, diabetic (BD ULTRA-FINE SAGAR PEN NEEDLE) 32 gauge x 5/32" Ndle One pen needle use with insulin pen 4 times a day.  ICD-10: E11.9    QUEtiapine (SEROQUEL) 50 MG tablet Take 1 tablet (50 mg total) by mouth nightly as needed (insomnia).    ticagrelor (BRILINTA) 60 mg tablet Take 1 tablet (60 mg total) by mouth 2 (two) times daily.    tobramycin-dexAMETHasone 0.3-0.1% (TOBRADEX) 0.3-0.1 % DrpS Place 1 drop into the right eye 4 (four) times daily.    triamcinolone acetonide 0.1% (KENALOG) 0.1 % cream Apply topically 2 (two) times daily.     Family History       Problem Relation (Age of Onset)    Allergy (severe) Daughter    Cancer Mother, Father    Diabetes Sister    Heart disease Mother    Hypertension Sister    Lung cancer Brother    No Known Problems Daughter          Tobacco Use    Smoking status: Never    Smokeless tobacco: Never   Substance and Sexual Activity    Alcohol use: Not Currently    Drug use: Never    Sexual activity: Not Currently     Partners: Male     Review of Systems   Constitutional:  Negative for chills, diaphoresis and fatigue.   Respiratory:  Negative for cough, choking and shortness of breath.    Cardiovascular:  Positive for chest pain. Negative for palpitations and leg swelling.   Gastrointestinal:  Negative for abdominal distention, abdominal pain and anal bleeding.   Neurological:  Positive for dizziness.     Objective:     Vital Signs (Most Recent):  Temp: 98.4 °F (36.9 °C) (02/15/25 1200)  Pulse: (!) 43 (02/15/25 1500)  Resp: (!) 23 (02/15/25 1500)  BP: (!) " 118/55 (02/15/25 1500)  SpO2: 98 % (02/15/25 1500) Vital Signs (24h Range):  Temp:  [97.2 °F (36.2 °C)-98.4 °F (36.9 °C)] 98.4 °F (36.9 °C)  Pulse:  [40-45] 43  Resp:  [14-48] 23  SpO2:  [98 %-100 %] 98 %  BP: (114-185)/(55-73) 118/55     Weight: 85.9 kg (189 lb 6 oz)  Body mass index is 27.97 kg/m².     Physical Exam  Constitutional:       General: She is not in acute distress.     Appearance: Normal appearance. She is not ill-appearing.   Cardiovascular:      Rate and Rhythm: Bradycardia present.      Pulses: Normal pulses.      Heart sounds: Normal heart sounds. No murmur heard.     No friction rub. No gallop.   Pulmonary:      Effort: Pulmonary effort is normal. No respiratory distress.      Breath sounds: Normal breath sounds. No stridor.   Abdominal:      General: Abdomen is flat. There is no distension.      Palpations: Abdomen is soft. There is no mass.   Neurological:      General: No focal deficit present.      Mental Status: She is alert and oriented to person, place, and time.                Significant Labs: CBC:   Recent Labs   Lab 02/15/25  0611   WBC 9.24   HGB 10.5*   HCT 32.2*        CMP:   Recent Labs   Lab 02/15/25  0611      K 4.3      CO2 15*   *   BUN 83*   CREATININE 3.5*   CALCIUM 8.8   PROT 7.4   ALBUMIN 3.3*   BILITOT 0.2   ALKPHOS 217*   AST 44*   ALT 42   ANIONGAP 14       Significant Imaging: I have reviewed all pertinent imaging results/findings within the past 24 hours.

## 2025-02-15 NOTE — Clinical Note
The lead was inserted under fluorscopic guidance, thresholds were tested, was sutured in place and was secured in place. The lead was inserted in the right ventricle.

## 2025-02-15 NOTE — ED NOTES
Patient in bed awake and alert, patient describes pain to chest as heaviness, monitor placed on patient, currently not paced per Dr. Michael, bradycardic on monitor, will continue to observe.

## 2025-02-15 NOTE — HPI
A pleasant  72 yo F with PMHx of  DM2, Fibromyalgia, lymphoma s/p chemo, HTN, HLD, CVA, MI, CAD s/p PCIs  CKD3b, HFpEF, and anemia who presented to the ED with a chief complaint of chest pain radiating to the back with onset a day before presentation.    Pain was said to be sudden in onset; no associated nausea or diaphoresis.  No Preceding PND orthopnea or leg swelling; pain was described as different from her previous coronary events.  Patient however endorsed dizziness but this has been going on for some time.  She denied any syncopal event  Symptoms progress and this prompted patient to come to the ED    Retrospective chart review indicates multiple ED visits for observation for atypical chest pain.  Cardiac history is significant for CAD with JESIKA x 2 to RCA 3/29/19 - JESIKA to RI and Cx 1/8/20      On presentation to the ED; patient was bradycardic to 32 BPM which subsequently improved and mildly hypertensive.  EKG showed complete heart block.  Patient was admitted to the ICU for further management.

## 2025-02-15 NOTE — Clinical Note
Diagnosis: Bradycardia [[id number]]   Future Attending Provider: ROBSON SIN [[id number]]   Reason for IP Medical Treatment  (Clinical interventions that can only be accomplished in the IP setting? ) :: BRadycardia

## 2025-02-15 NOTE — Clinical Note
Patient is in the supine position.   The body was positioned using the following devices: safety strap, blanket and wedge.  Procedure performed on a bed/cart.The left arm was positioned using the following devices: arm board and blanket.  The right arm was positioned using the following devices: arm board and blanket.  The patient was positioned by Todd Messina RN  The catheter was inserted into the, was removed from the and was inserted over the wire into the ostium   right coronary artery. An angiography was performed of the right coronary arteries. Multiple views were taken.

## 2025-02-15 NOTE — ASSESSMENT & PLAN NOTE
Patient with known CAD s/p stent placement and CABG, which is controlled Will continue Statin; hold aspirin and Plavix setting of impending pacing and monitor for S/Sx of angina/ACS. Continue to monitor on telemetry.   -repeat ischemic evaluation of after PPM

## 2025-02-15 NOTE — ED PROVIDER NOTES
Encounter Date: 2/15/2025       History     Chief Complaint   Patient presents with    Chest Pain     X 24 hours, relieved by 324 ASA and 3 nitro, BIB [facility] 14     HPI    74-year-old female with a history of CKD, diastolic heart failure, CAD s/p PCI, hypertension, fibromyalgia, and lymphoma presents to ED for evaluation of 24 hours of epigastric to lower midline chest pain that radiates to her back and into the left side of her chest, left neck and left arm.  Describes the pain has a tightness.  Given 324 of 3 doses of nitro with the EMS.  Patient notes pain has improved.  Notes pain has a 4/10 at this time.  Denies any shortness of breath, nausea, vomiting.  She notes she does feel anxious.    Review of patient's allergies indicates:   Allergen Reactions    Novolin 70/30 (semi-synthetic) Nausea And Vomiting     Severe vomiting on Relion 70/30    Sulfa (sulfonamide antibiotics) Anaphylaxis    Talwin [pentazocine lactate] Anaphylaxis    Victoza [liraglutide] Nausea And Vomiting    Glipizide Nausea Only    Codeine     Influenza virus vaccines Hives    Iodine and iodide containing products Hives    Levetiracetam Itching    Lyrica [pregabalin] Hallucinations    Neurontin [gabapentin]      Possible associated myoclonic jerk    Rituxan [rituximab] Hives    Zoloft [sertraline] Nausea And Vomiting     Past Medical History:   Diagnosis Date    Age-related osteoporosis with current pathological fracture with routine healing 11/13/2024    Allergy     Altered mental status 06/19/2022    DYSARTHRIA, SPASTIC MOVEMENTS & DIFFICULTY SWALLOWING    Anemia     Anxiety     Arthritis     Cataract     both removed    Colon polyps     Coronary artery disease     Depression     Diabetes mellitus, type II     Disorder of kidney and ureter     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/2023    Fibromyalgia     Follicular lymphoma     GERD (gastroesophageal reflux disease)     HTN (hypertension)     Hyperlipidemia     MI (myocardial infarction) 03/2019     Personal history of colonic polyps     Restless leg syndrome     Stroke      Past Surgical History:   Procedure Laterality Date    APPLICATION OF WOUND VACUUM-ASSISTED CLOSURE DEVICE Right 9/25/2024    Procedure: APPLICATION, WOUND VAC;  Surgeon: Neto Davalos MD;  [address];  Service: Orthopedics;  Laterality: Right;    COLONOSCOPY  11/07/2012    Colon polyp found; repeat in 5 years    COLONOSCOPY N/A 11/4/2024    Procedure: COLONOSCOPY;  Surgeon: David Castaneda MD;  Location: [facility] ENDO (2ND FLR);  Service: Endoscopy;  Laterality: N/A;    DEBRIDEMENT OF LOCAL FLAP Right 9/25/2024    Procedure: DEBRIDEMENT, LOCAL FLAP;  Surgeon: Neto Davalos MD;  [facility];  Service: Orthopedics;  Laterality: Right;    ELBOW SURGERY Right 2015    dislocation repair     ESOPHAGOGASTRODUODENOSCOPY  11/07/2012    atrophic gastritis, H pylori testing negative    INCISION AND DRAINAGE FOOT Right 6/2/2021    Procedure: INCISION AND DRAINAGE, FOOT, bone biopsy;  Surgeon: Quiana Penn DPM;  Location: [facility];  Service: Podiatry;  Laterality: Right;    IRRIGATION AND DEBRIDEMENT OF LOWER EXTREMITY Right 9/25/2024    Procedure: IRRIGATION AND DEBRIDEMENT, LOWER EXTREMITY; slider table, supine, bone foam, cysto tubing, 6L NS/dakins/peroxide, culture swabs;  Surgeon: Neto Davalos MD;  Location: [address];  Service: Orthopedics;  Laterality: Right;    KNEE SURGERY Bilateral 2015    scoped    LEFT HEART CATHETERIZATION Left 3/29/2019    Procedure: Left heart cath;  Surgeon: Bladimir Barbosa MD;  Location: [facility] CATH LAB;  Service: Cardiology;  Laterality: Left;    LEFT HEART CATHETERIZATION Left 11/18/2019    Procedure: Left heart cath;  Surgeon: Karl Rico MD;  Location: [facility] CATH LAB;  Service: Cardiology;  Laterality: Left;    LEFT HEART CATHETERIZATION Left 1/8/2020    Procedure: Left heart cath, right radial, noon start;  Surgeon: Christos Monreal MD;   Location: [facility] CATH LAB;  Service: Cardiology;  Laterality: Left;  RN Pre Op 1-6-20.  To be admitted 1-7-20 sor Aspirin Disensitation    OPEN REDUCTION AND INTERNAL FIXATION (ORIF) OF FRACTURE OF ACETABULUM Right 8/16/2024    Procedure: ORIF, FRACTURE, ACETABULUM;  Surgeon: Neto Davalos MD;  Location: [facility] OR [address] [place] [address];  Service: Orthopedics;  Laterality: Right;  anterior and lateral pelvic incisions    OPEN REDUCTION AND INTERNAL FIXATION (ORIF) OF PILON FRACTURE Right 8/14/2024    Procedure: ORIF, FRACTURE, PILON;  Surgeon: Neto Davalos MD;  [facility];  Service: Orthopedics;  Laterality: Right;    TONSILLECTOMY  1955    ULTRASOUND GUIDANCE  1/8/2020    Procedure: Ultrasound Guidance;  Surgeon: Christos Monreal MD;  Location: [facility] CATH LAB;  Service: Cardiology;;     Family History   Problem Relation Name Age of Onset    Cancer Mother          colon    Heart disease Mother      Cancer Father          lung    Lung cancer Brother      Diabetes Sister      Hypertension Sister      Allergy (severe) Daughter erin     No Known Problems Daughter      Stroke Neg Hx      Hyperlipidemia Neg Hx       Social History[1]  Review of Systems   Constitutional:  Negative for fever.   HENT:  Negative for sore throat.    Respiratory:  Negative for shortness of breath.    Cardiovascular:  Positive for chest pain. Negative for palpitations and leg swelling.   Gastrointestinal:  Negative for abdominal pain, nausea and vomiting.   Genitourinary:  Negative for dysuria.   Musculoskeletal:  Negative for back pain.   Skin:  Negative for rash.   Neurological:  Negative for weakness.   Hematological:  Does not bruise/bleed easily.       Physical Exam     Initial Vitals   BP Pulse Resp Temp SpO2   02/15/25 0608 02/15/25 0609 02/15/25 0608 02/15/25 0607 02/15/25 0609   (!) 131/59 (!) 45 18 97.8 °F (36.6 °C) 100 %      MAP       --                Physical Exam    Constitutional: Vital signs are normal. She  appears well-developed and well-nourished.  Non-toxic appearance. She does not have a sickly appearance. She does not appear ill.   HENT:   Head: Normocephalic and atraumatic. Mouth/Throat: Mucous membranes are normal.   Eyes: EOM are normal.   Neck: Neck supple.   Cardiovascular:  Regular rhythm.   Bradycardia present.         Pulmonary/Chest: She has no wheezes. She has no rhonchi. She has no rales.   Abdominal: Abdomen is soft. Bowel sounds are normal. There is no abdominal tenderness. There is no rebound and no guarding.   Musculoskeletal:      Cervical back: Neck supple.     Neurological: She is alert.   Skin: Skin is warm and dry. No rash noted.   Psychiatric: She has a normal mood and affect.         ED Course   Procedures  Labs Reviewed   CBC W/ AUTO DIFFERENTIAL - Abnormal       Result Value    WBC 9.24      RBC 3.54 (*)     Hemoglobin 10.5 (*)     Hematocrit 32.2 (*)     MCV 91      MCH 29.7      MCHC 32.6      RDW 14.7 (*)     Platelets 209      MPV 12.8      Immature Granulocytes 0.4      Gran # (ANC) 6.3      Immature Grans (Abs) 0.04      Lymph # 1.9      Mono # 0.8      Eos # 0.2      Baso # 0.08      nRBC 0      Gran % 68.0      Lymph % 20.0      Mono % 8.3      Eosinophil % 2.4      Basophil % 0.9      Differential Method Automated     COMPREHENSIVE METABOLIC PANEL - Abnormal    Sodium 136      Potassium 4.3      Chloride 107      CO2 15 (*)     Glucose 158 (*)     BUN 83 (*)     Creatinine 3.5 (*)     Calcium 8.8      Total Protein 7.4      Albumin 3.3 (*)     Total Bilirubin 0.2      Alkaline Phosphatase 217 (*)     AST 44 (*)     ALT 42      eGFR 13 (*)     Anion Gap 14     TROPONIN I - Abnormal    Troponin I 0.037 (*)    B-TYPE NATRIURETIC PEPTIDE - Abnormal    BNP 1,115 (*)           Imaging Results              X-Ray Chest 1 View (In process)                      Medications - No data to display  Medical Decision Making  Amount and/or Complexity of Data Reviewed  Radiology:  ordered.    Risk  Prescription drug management.    /66, HR 44, RR 25, SpO2 100%, T 97.8°  EKG read has a complete block; has a progressively shortening TN interval with some apparent buried P waves; perhaps a complete heart block; no changes concerning for acute ischemia  Spoke with cardiology who recommends admission to the ICU, hold off on pacing at this time  Troponin 0.037   BNP of 1115  Creatinine 3.5, baseline of 2  Given 324 of aspirin with the EMS  Patient admitted to the ICU for further management                                    Clinical Impression:  Final diagnoses:  [R07.9] Chest pain  [R00.1] Bradycardia (Primary)                   Greg Michael MD  02/15/25 0732         [1]   Social History  Tobacco Use    Smoking status: Never    Smokeless tobacco: Never   Substance Use Topics    Alcohol use: Not Currently    Drug use: Never        Greg Michael MD  02/15/25 0753

## 2025-02-15 NOTE — Clinical Note
Lab Results   Component Value Date    HGBA1C 10 1 (A) 07/13/2022   · Last A1c prior was 6 8  · Fasting sugars consistently in the 250's  Asked to bring log to next appointment   · Actively trying to lose weight then change dietary habits  Has lost close to 20 pounds  · Pamphlets about program administered along with Education about nutrition  · Unsure if [facility] is actively enrolling patients but will put necessary referrals in  · Continue good oral hydration  · Diabetic foot exam performed 7/13  · Ophthalmologic evaluation within the next month secondary to Pseudotumor cerebri  · Endocrinology referral placed in ED, scheduled for October  · Referral to Diabetes Education, Nutrition, Medical Fitness  Consent not signed  · Increase Metformin to 1,000 mg BID from 500  Will see how she tolerates    · RTC in 1 month with blood glucose logs  · Next A1C in 2 months The procedural consent was signed. A history and physical note was completed in the chart.

## 2025-02-15 NOTE — ASSESSMENT & PLAN NOTE
Hx multivessel CAD. Suspect CP is demand ischemia from CHB over ACS.High risk for LHC with Cr 3.5. Will plan repeat ischemic evlaution after PPM if still symptomatic. EF normal on echo

## 2025-02-16 PROBLEM — N17.9 ACUTE KIDNEY INJURY SUPERIMPOSED ON STAGE 3B CHRONIC KIDNEY DISEASE: Status: ACTIVE | Noted: 2025-02-16

## 2025-02-16 PROBLEM — N18.32 ACUTE KIDNEY INJURY SUPERIMPOSED ON STAGE 3B CHRONIC KIDNEY DISEASE: Status: ACTIVE | Noted: 2025-02-16

## 2025-02-16 LAB
ALBUMIN SERPL BCP-MCNC: 2.9 G/DL (ref 3.5–5.2)
ANION GAP SERPL CALC-SCNC: 13 MMOL/L (ref 8–16)
BUN SERPL-MCNC: 91 MG/DL (ref 8–23)
CALCIUM SERPL-MCNC: 8.4 MG/DL (ref 8.7–10.5)
CHLORIDE SERPL-SCNC: 109 MMOL/L (ref 95–110)
CO2 SERPL-SCNC: 11 MMOL/L (ref 23–29)
CREAT SERPL-MCNC: 3.8 MG/DL (ref 0.5–1.4)
EST. GFR  (NO RACE VARIABLE): 12 ML/MIN/1.73 M^2
GLUCOSE SERPL-MCNC: 223 MG/DL (ref 70–110)
PHOSPHATE SERPL-MCNC: 5.9 MG/DL (ref 2.7–4.5)
POCT GLUCOSE: 234 MG/DL (ref 70–110)
POCT GLUCOSE: 239 MG/DL (ref 70–110)
POCT GLUCOSE: 248 MG/DL (ref 70–110)
POCT GLUCOSE: 269 MG/DL (ref 70–110)
POTASSIUM SERPL-SCNC: 5.1 MMOL/L (ref 3.5–5.1)
SODIUM SERPL-SCNC: 133 MMOL/L (ref 136–145)

## 2025-02-16 PROCEDURE — 80069 RENAL FUNCTION PANEL: CPT | Mod: HCNC | Performed by: PHYSICIAN ASSISTANT

## 2025-02-16 PROCEDURE — 99232 SBSQ HOSP IP/OBS MODERATE 35: CPT | Mod: HCNC,,, | Performed by: PHYSICIAN ASSISTANT

## 2025-02-16 PROCEDURE — 63600175 PHARM REV CODE 636 W HCPCS: Mod: HCNC | Performed by: EMERGENCY MEDICINE

## 2025-02-16 PROCEDURE — 63600175 PHARM REV CODE 636 W HCPCS: Mod: HCNC

## 2025-02-16 PROCEDURE — 94761 N-INVAS EAR/PLS OXIMETRY MLT: CPT | Mod: HCNC

## 2025-02-16 PROCEDURE — 20000000 HC ICU ROOM: Mod: HCNC

## 2025-02-16 PROCEDURE — 25000003 PHARM REV CODE 250: Mod: HCNC | Performed by: EMERGENCY MEDICINE

## 2025-02-16 PROCEDURE — 99233 SBSQ HOSP IP/OBS HIGH 50: CPT | Mod: HCNC,,, | Performed by: INTERNAL MEDICINE

## 2025-02-16 PROCEDURE — 99900035 HC TECH TIME PER 15 MIN (STAT): Mod: HCNC

## 2025-02-16 PROCEDURE — 36415 COLL VENOUS BLD VENIPUNCTURE: CPT | Mod: HCNC | Performed by: PHYSICIAN ASSISTANT

## 2025-02-16 RX ORDER — SODIUM CHLORIDE, SODIUM LACTATE, POTASSIUM CHLORIDE, CALCIUM CHLORIDE 600; 310; 30; 20 MG/100ML; MG/100ML; MG/100ML; MG/100ML
INJECTION, SOLUTION INTRAVENOUS CONTINUOUS
Status: ACTIVE | OUTPATIENT
Start: 2025-02-16 | End: 2025-02-16

## 2025-02-16 RX ORDER — INSULIN ASPART 100 [IU]/ML
5 INJECTION, SOLUTION INTRAVENOUS; SUBCUTANEOUS
Status: DISCONTINUED | OUTPATIENT
Start: 2025-02-16 | End: 2025-02-18

## 2025-02-16 RX ADMIN — INSULIN ASPART 4 UNITS: 100 INJECTION, SOLUTION INTRAVENOUS; SUBCUTANEOUS at 12:02

## 2025-02-16 RX ADMIN — INSULIN ASPART 2 UNITS: 100 INJECTION, SOLUTION INTRAVENOUS; SUBCUTANEOUS at 09:02

## 2025-02-16 RX ADMIN — ACETAMINOPHEN 650 MG: 325 TABLET ORAL at 10:02

## 2025-02-16 RX ADMIN — INSULIN GLARGINE 10 UNITS: 100 INJECTION, SOLUTION SUBCUTANEOUS at 09:02

## 2025-02-16 RX ADMIN — FUROSEMIDE 40 MG: 40 TABLET ORAL at 09:02

## 2025-02-16 RX ADMIN — ATORVASTATIN CALCIUM 80 MG: 40 TABLET, FILM COATED ORAL at 09:02

## 2025-02-16 RX ADMIN — HYDRALAZINE HYDROCHLORIDE 100 MG: 25 TABLET ORAL at 02:02

## 2025-02-16 RX ADMIN — MUPIROCIN: 20 OINTMENT TOPICAL at 08:02

## 2025-02-16 RX ADMIN — HYDRALAZINE HYDROCHLORIDE 100 MG: 25 TABLET ORAL at 06:02

## 2025-02-16 RX ADMIN — INSULIN ASPART 4 UNITS: 100 INJECTION, SOLUTION INTRAVENOUS; SUBCUTANEOUS at 04:02

## 2025-02-16 RX ADMIN — FLUOXETINE HYDROCHLORIDE 40 MG: 20 CAPSULE ORAL at 08:02

## 2025-02-16 RX ADMIN — INSULIN ASPART 6 UNITS: 100 INJECTION, SOLUTION INTRAVENOUS; SUBCUTANEOUS at 07:02

## 2025-02-16 RX ADMIN — MUPIROCIN: 20 OINTMENT TOPICAL at 09:02

## 2025-02-16 RX ADMIN — SODIUM CHLORIDE, POTASSIUM CHLORIDE, SODIUM LACTATE AND CALCIUM CHLORIDE: 600; 310; 30; 20 INJECTION, SOLUTION INTRAVENOUS at 12:02

## 2025-02-16 RX ADMIN — ISOSORBIDE MONONITRATE 60 MG: 30 TABLET, EXTENDED RELEASE ORAL at 08:02

## 2025-02-16 RX ADMIN — FUROSEMIDE 40 MG: 40 TABLET ORAL at 08:02

## 2025-02-16 RX ADMIN — INSULIN ASPART 5 UNITS: 100 INJECTION, SOLUTION INTRAVENOUS; SUBCUTANEOUS at 05:02

## 2025-02-16 RX ADMIN — HYDRALAZINE HYDROCHLORIDE 100 MG: 25 TABLET ORAL at 09:02

## 2025-02-16 RX ADMIN — PANTOPRAZOLE SODIUM 40 MG: 40 TABLET, DELAYED RELEASE ORAL at 08:02

## 2025-02-16 RX ADMIN — ONDANSETRON 4 MG: 2 INJECTION INTRAMUSCULAR; INTRAVENOUS at 08:02

## 2025-02-16 NOTE — HOSPITAL COURSE
2/16/25 C/O neck pain and poor appetite. HR 40 CHB. Agreeable to PPM tomorrow  2/18/25 N/V overnight. Feels a little better this AM. A-sensed V-paced 90s. Cr 4.1. PPM dressing changed - site C/D/I    2/19/25 Remains tachycardic 100-110. Magnet placed over device due to concern that she was in PMT. Medtronic interrogated device with good function and no PMT detected. Device is tracking atrial rate at 100-110. Patient feels fatigued. Had CP overnight. Troponin 1.9 to 2.3 to 3.3. Repeat echo ordered    2/20/25 Feels better today - hungry and thirsty. A-sensed V-paced HR improved 70-80s. Cr 4.1      2/19/25 LHC - EDP 25, Peak to peak LV to Aortic gradient 20 mmHg, LAD long 50% mid, D1 long 70% proximal, RI luminal irregularities - stent patent, Cx luminal irregularities - stent patent, RCA patent mid stent with 50% ISR     No culprit lesion identified - reviewed with Dr Saldaña - medical Rx  25 cc contrast used - hydrate post procedure  Continue ASA/brilinta    2/17/25 Medtronic dual pacemaker placed left SC vein     Echo 2/15/25    Left Ventricle: The left ventricle is normal in size. There is moderate concentric hypertrophy. There is hyperdynamic systolic function with a visually estimated ejection fraction of 70 - 75%.    Right Ventricle: Normal right ventricular cavity size. Systolic function is normal.    Left Atrium: Left atrium is moderately dilated.    Right Atrium: Right atrium is mildly dilated.    Aortic Valve: There is moderate stenosis. Aortic valve area by VTI is 0.8 cm². Aortic valve peak velocity is 3.4 m/s. Mean gradient is 27 mmHg. The dimensionless index is 0.27.    Mitral Valve: There is mild stenosis. The mean pressure gradient across the mitral valve is 5 mmHg at a heart rate of 42  bpm. There is mild regurgitation.    Tricuspid Valve: There is moderate regurgitation.    Pulmonary Artery: The estimated pulmonary artery systolic pressure is 49 mmHg.    IVC/SVC: Intermediate venous pressure at 8  mmHg.

## 2025-02-16 NOTE — ASSESSMENT & PLAN NOTE
Patient's FSGs are controlled on current medication regimen.  Last A1c reviewed-   Lab Results   Component Value Date    HGBA1C 5.9 (H) 01/14/2025     Most recent fingerstick glucose reviewed-   Recent Labs   Lab 02/15/25  1234 02/15/25  1804 02/15/25  2154 02/16/25  0729   POCTGLUCOSE 212* 246* 226* 269*     Current correctional scale  Medium  Maintain anti-hyperglycemic dose as follows-   Antihyperglycemics (From admission, onward)      Start     Stop Route Frequency Ordered    02/16/25 1645  insulin aspart U-100 pen 5 Units         -- SubQ 3 times daily with meals 02/16/25 1146    02/15/25 2100  insulin glargine U-100 (Lantus) pen 10 Units         -- SubQ Nightly 02/15/25 1257    02/15/25 1358  insulin aspart U-100 pen 0-10 Units         -- SubQ Before meals & nightly PRN 02/15/25 1258          Hold Oral hypoglycemics while patient is in the hospital.; cardiac consistent carbohydrate diet; monitor blood glucose log and titrate to effect  Prandial aspart added

## 2025-02-16 NOTE — ASSESSMENT & PLAN NOTE
Creatine stable for now. BMP reviewed- noted Estimated Creatinine Clearance: 15.2 mL/min (A) (based on SCr of 3.8 mg/dL (H)). according to latest data. Based on current GFR, CKD stage is stage 4 - GFR 15-29.  Monitor UOP and serial BMP and adjust therapy as needed. Renally dose meds. Avoid nephrotoxic medications and procedures.

## 2025-02-16 NOTE — PLAN OF CARE
Patient admitted from ER today. NS 50 ml on flow. Taking orally.  Urine passed . All due treatment given Urine sent to lab. Blood sugar done and insulin given according to sliding scale.     Problem: Adult Inpatient Plan of Care  Goal: Plan of Care Review  Outcome: Progressing  Goal: Patient-Specific Goal (Individualized)  Outcome: Progressing  Goal: Absence of Hospital-Acquired Illness or Injury  Outcome: Progressing  Goal: Optimal Comfort and Wellbeing  Outcome: Progressing  Goal: Readiness for Transition of Care  Outcome: Progressing     Problem: Diabetes Comorbidity  Goal: Blood Glucose Level Within Targeted Range  Outcome: Progressing     Problem: Acute Kidney Injury/Impairment  Goal: Fluid and Electrolyte Balance  Outcome: Progressing  Goal: Improved Oral Intake  Outcome: Progressing  Goal: Effective Renal Function  Outcome: Progressing     Problem: Wound  Goal: Improved Oral Intake  Outcome: Progressing  Goal: Optimal Pain Control and Function  Outcome: Progressing  Goal: Skin Health and Integrity  Outcome: Progressing     Problem: Fall Injury Risk  Goal: Absence of Fall and Fall-Related Injury  Outcome: Progressing     Problem: Skin Injury Risk Increased  Goal: Skin Health and Integrity  Outcome: Progressing     Problem: Wound  Goal: Optimal Coping  Outcome: Met  Goal: Optimal Functional Ability  Outcome: Met  Goal: Absence of Infection Signs and Symptoms  Outcome: Met  Goal: Optimal Wound Healing  Outcome: Met

## 2025-02-16 NOTE — PLAN OF CARE
"Case Management Assessment     PCP: Jorge Paris MD    Pharmacy:   [facility] [place] - [address] [facility]  [address] [facility]  [place] [address]  Phone: [phone] Fax: [phone]    [facility] [place] [address] [address] [facility]  [address]   [place] [address]  Phone: [phone] Fax: [phone]    Patient Arrived From: Home  Existing Help at Home: Grandson, Daughter Noelle, and daughter Brooke    Barriers to Discharge: None    Discharge Plan:    A. Home   B. Home with family    CM met with pt at [facility] to discuss discharge planning. Pt resides with her grandson and pt claims that she uses "everything" at home for mobility. Pt has a wheelchair, walker, rollator, shower chair but does not have a CPAP or Bipap machine. Pt is independent and her sister Nelia will provide transportation at discharge. Pt states that she has physical therapy with [facility] and would like to resume services upon discharge. Pt states that she was recently at the hospital as she fell and hit her head. CM will continue to follow-up for discharge needs.   02/16/25 1154   Discharge Assessment   Assessment Type Discharge Planning Assessment   Confirmed/corrected address, phone number and insurance Yes   Confirmed Demographics Correct on Facesheet   Source of Information patient   Communicated MIKHAIL with patient/caregiver Date not available/Unable to determine   Reason For Admission Complete heart block   People in Home grandchild(daniel)   Facility Arrived From: Home   Do you expect to return to your current living situation? Yes   Do you have help at home or someone to help you manage your care at home? Yes   Who are your caregiver(s) and their phone number(s)? Grandmuriel, Daughter Noelle, and kenna Cox   Current cognitive status: Alert/Oriented   Walking or Climbing Stairs Difficulty yes   Walking or Climbing Stairs ambulation difficulty, requires equipment   Mobility Management wheelchair, walker, rollator "   Dressing/Bathing Difficulty yes   Dressing/Bathing bathing difficulty, requires equipment   Dressing/Bathing Management Shower chair   Equipment Currently Used at Home wheelchair;walker, rolling;shower chair;rollator   Readmission within 30 days? No   Patient currently being followed by outpatient case management? No   Do you currently have service(s) that help you manage your care at home? Yes   Name and Contact number of agency [facility]   Is the pt/caregiver preference to resume services with current agency Yes   Do you take prescription medications? Yes   Do you have prescription coverage? Yes   Coverage [employer]   Do you have any problems affording any of your prescribed medications? No   Is the patient taking medications as prescribed? yes   Who is going to help you get home at discharge? Sister-Nelia   How do you get to doctors appointments? family or friend will provide   Are you on dialysis? No   Do you take coumadin? No   Discharge Plan A Home   Discharge Plan B Home with family   DME Needed Upon Discharge  other (see comments)  (TBD)   Discharge Plan discussed with: Patient   Transition of Care Barriers None   Physical Activity   On average, how many days per week do you engage in moderate to strenuous exercise (like a brisk walk)? 7 days   On average, how many minutes do you engage in exercise at this level? 30 min   Financial Resource Strain   How hard is it for you to pay for the very basics like food, housing, medical care, and heating? Not hard   Housing Stability   In the last 12 months, was there a time when you were not able to pay the mortgage or rent on time? N   At any time in the past 12 months, were you homeless or living in a shelter (including now)? N   Transportation Needs   Has the lack of transportation kept you from medical appointments, meetings, work or from getting things needed for daily living? No   Food Insecurity   Within the past 12 months, you worried that your  food would run out before you got the money to buy more. Never true   Within the past 12 months, the food you bought just didn't last and you didn't have money to get more. Never true   Stress   Do you feel stress - tense, restless, nervous, or anxious, or unable to sleep at night because your mind is troubled all the time - these days? Very much   Social Isolation   How often do you feel lonely or isolated from those around you?  Rarely   Alcohol Use   Q1: How often do you have a drink containing alcohol? Never   Q2: How many drinks containing alcohol do you have on a typical day when you are drinking? None   Utilities   In the past 12 months has the electric, gas, oil, or water company threatened to shut off services in your home? No

## 2025-02-16 NOTE — PROGRESS NOTES
[facility] Intensive Care  Cardiology  Progress Note    Patient Name: Lorena Contreras  MRN: [id number]  Admission Date: 2/15/2025  Hospital Length of Stay: 1 days  Code Status: Full Code   Attending Physician: Germaine Murray MD   Primary Care Physician: Jorge Paris MD  Expected Discharge Date:   Principal Problem:Complete heart block    Subjective:     Hospital Course:   2/16/25 C/O neck pain and poor appetite. HR 40 CHB. Agreeable to PPM tomorrow    Echo 2/15/25    Left Ventricle: The left ventricle is normal in size. There is moderate concentric hypertrophy. There is hyperdynamic systolic function with a visually estimated ejection fraction of 70 - 75%.    Right Ventricle: Normal right ventricular cavity size. Systolic function is normal.    Left Atrium: Left atrium is moderately dilated.    Right Atrium: Right atrium is mildly dilated.    Aortic Valve: There is moderate stenosis. Aortic valve area by VTI is 0.8 cm². Aortic valve peak velocity is 3.4 m/s. Mean gradient is 27 mmHg. The dimensionless index is 0.27.    Mitral Valve: There is mild stenosis. The mean pressure gradient across the mitral valve is 5 mmHg at a heart rate of 42  bpm. There is mild regurgitation.    Tricuspid Valve: There is moderate regurgitation.    Pulmonary Artery: The estimated pulmonary artery systolic pressure is 49 mmHg.    IVC/SVC: Intermediate venous pressure at 8 mmHg.    Interval History:     Review of Systems   Constitutional: Negative for decreased appetite.   HENT:  Negative for ear discharge.    Eyes:  Negative for blurred vision.   Respiratory:  Negative for hemoptysis.    Endocrine: Negative for polyphagia.   Hematologic/Lymphatic: Negative for adenopathy.   Skin:  Negative for color change.   Musculoskeletal:  Negative for joint swelling.   Genitourinary:  Negative for bladder incontinence.   Neurological:  Negative for brief paralysis.   Psychiatric/Behavioral:  Negative for hallucinations.     Allergic/Immunologic: Negative for hives.     Objective:     Vital Signs (Most Recent):  Temp: 98 °F (36.7 °C) (02/16/25 0400)  Pulse: (!) 38 (02/16/25 0600)  Resp: 16 (02/16/25 0600)  BP: (!) 148/64 (02/16/25 0623)  SpO2: 100 % (02/16/25 0815) Vital Signs (24h Range):  Temp:  [97.8 °F (36.6 °C)-98.4 °F (36.9 °C)] 98 °F (36.7 °C)  Pulse:  [38-44] 38  Resp:  [16-34] 16  SpO2:  [97 %-100 %] 100 %  BP: (115-177)/(54-84) 148/64     Weight: 85.9 kg (189 lb 6 oz)  Body mass index is 27.97 kg/m².     SpO2: 100 %         Intake/Output Summary (Last 24 hours) at 2/16/2025 1042  Last data filed at 2/16/2025 0600  Gross per 24 hour   Intake 1150.24 ml   Output 320 ml   Net 830.24 ml       Lines/Drains/Airways       Drain  Duration             Female External Urinary Catheter w/ Suction 02/15/25 0011 1 day              Peripheral Intravenous Line  Duration                  Peripheral IV - Single Lumen 02/15/25 0610 20 G Anterior;Right Forearm 1 day         Peripheral IV - Single Lumen 02/15/25 0843 20 G Left Antecubital 1 day                       Physical Exam  Constitutional:       Appearance: She is well-developed.   HENT:      Head: Normocephalic and atraumatic.   Eyes:      Conjunctiva/sclera: Conjunctivae normal.      Pupils: Pupils are equal, round, and reactive to light.   Cardiovascular:      Rate and Rhythm: Bradycardia present.      Pulses: Intact distal pulses.      Heart sounds: Normal heart sounds.   Pulmonary:      Effort: Pulmonary effort is normal.      Breath sounds: Normal breath sounds.   Abdominal:      General: Bowel sounds are normal.      Palpations: Abdomen is soft.   Musculoskeletal:         General: Normal range of motion.      Cervical back: Normal range of motion and neck supple.   Skin:     General: Skin is warm and dry.   Neurological:      Mental Status: She is alert and oriented to person, place, and time.            Significant Labs: All pertinent lab results from the last 24 hours have been  reviewed.    Significant Imaging: Echocardiogram: 2D echo with color flow doppler: No results found. However, due to the size of the patient record, not all encounters were searched. Please check Results Review for a complete set of results.  Assessment and Plan:     Brief HPI:     CHB (complete heart block)  New Dx. Hold rate lowering medications. Hold ASA/brilinta. BP adequate no need for TVP at  this point. Will plan PPM Monday if CHB persists. EF normal on echo    2/16/25 Still with CHB. Dual PPM tomorrow - risks/benefits explained and she agrees to proceed    Aortic stenosis  AS moderate    Echo  Result Date: 2/15/2025    Left Ventricle: The left ventricle is normal in size. There is moderate   concentric hypertrophy. There is hyperdynamic systolic function with a   visually estimated ejection fraction of 70 - 75%.    Right Ventricle: Normal right ventricular cavity size. Systolic   function is normal.    Left Atrium: Left atrium is moderately dilated.    Right Atrium: Right atrium is mildly dilated.    Aortic Valve: There is moderate stenosis. Aortic valve area by VTI is   0.8 cm². Aortic valve peak velocity is 3.4 m/s. Mean gradient is 27 mmHg.   The dimensionless index is 0.27.    Mitral Valve: There is mild stenosis. The mean pressure gradient across   the mitral valve is 5 mmHg at a heart rate of 42  bpm. There is mild   regurgitation.    Tricuspid Valve: There is moderate regurgitation.    Pulmonary Artery: The estimated pulmonary artery systolic pressure is   49 mmHg.    IVC/SVC: Intermediate venous pressure at 8 mmHg.        Echo  Result Date: 10/31/2024    Left Ventricle: The left ventricle is normal in size. Normal wall   thickness. Normal wall motion. There is normal systolic function with a   visually estimated ejection fraction of 60 - 65%. Grade II diastolic   dysfunction. Elevated left ventricular filling pressure.    Right Ventricle: Normal right ventricular cavity size. Wall thickness   is  normal. Systolic function is normal.    Left Atrium: Left atrium is severely dilated.    Aortic Valve: There is moderate aortic valve sclerosis. Moderately   restricted motion. There is moderate stenosis. Aortic valve area by VTI is   1.1 cm2. Aortic valve peak velocity is 3.1 m/s. Mean gradient is 20.1   mmHg. The dimensionless index is 0.37.    Mitral Valve: There is mild regurgitation.    Tricuspid Valve: There is mild regurgitation.    Pulmonary Artery: There is pulmonary hypertension. The estimated   pulmonary artery systolic pressure is 46 mmHg.    IVC/SVC: Normal venous pressure at 3 mmHg.           Coronary artery disease of native artery of native heart with stable angina pectoris  Hx multivessel CAD. Suspect CP is demand ischemia from CHB over ACS.High risk for LHC with Cr 3.5. Will plan repeat ischemic evlaution after PPM if still symptomatic. EF normal on echo    Mixed hyperlipidemia  On statin          VTE Risk Mitigation (From admission, onward)           Ordered     IP VTE HIGH RISK PATIENT  Once         02/15/25 1252     Place sequential compression device  Until discontinued         02/15/25 1252     Place sequential compression device  Until discontinued         02/15/25 0802                    Karl Rico MD  Cardiology  [facility] - Intensive Care

## 2025-02-16 NOTE — PROGRESS NOTES
[facility] - Intensive Care  Nephrology  Progress Note    Patient Name: Lorena Contreras  MRN: [id number]  Admission Date: 2/15/2025  Hospital Length of Stay: 1 days  Attending Provider: Germaine Murray MD   Primary Care Physician: Jorge Paris MD  Principal Problem:Complete heart block    Subjective:     HPI: 74 yo F with PMHx of DM2, Fibromyalgia, lymphoma s/p chemo, HTN, HLD, CVA, MI, CAD s/p PCIs CKD3b, HFpEF, and anemia presented to  ED via EMS c/o chest pain radiating to the back x 1 day. Vitals on arrival HR 45bpm. Labs on arrival  Cr 3.5, BNP 1115, EKG complet hear block. CXR normal. Pt given asprin and nitro by ems prior to arrival.     Nephrology consulted for KYA on CKD 3b    Prior records obtained and reviewed. Baseline Cr 2, last at baseline 1/14/25. Cr on arrival 3.5. Pt state she does not have a nephrologist. Pt states she was doing well prior to yesterday. She denies nsaid use, change in uop, n/v.     Interval History: UOP 320ml. Pt reports vomiting and abdominal pain this am. + decrease appetite. No other complaints.     Review of patient's allergies indicates:   Allergen Reactions    Novolin 70/30 (semi-synthetic) Nausea And Vomiting     Severe vomiting on Relion 70/30    Sulfa (sulfonamide antibiotics) Anaphylaxis    Talwin [pentazocine lactate] Anaphylaxis    Victoza [liraglutide] Nausea And Vomiting    Glipizide Nausea Only    Codeine     Influenza virus vaccines Hives    Iodine and iodide containing products Hives    Levetiracetam Itching    Lyrica [pregabalin] Hallucinations    Neurontin [gabapentin]      Possible associated myoclonic jerk    Rituxan [rituximab] Hives    Zoloft [sertraline] Nausea And Vomiting     Current Facility-Administered Medications   Medication Frequency    acetaminophen tablet 650 mg Q4H PRN    albuterol-ipratropium 2.5 mg-0.5 mg/3 mL nebulizer solution 3 mL Q4H PRN    atorvastatin tablet 80 mg QHS    dextrose 50% injection 12.5 g PRN    dextrose 50% injection  25 g PRN    FLUoxetine capsule 40 mg Daily    furosemide tablet 40 mg BID    glucagon (human recombinant) injection 1 mg PRN    glucose chewable tablet 16 g PRN    glucose chewable tablet 24 g PRN    hydrALAZINE injection 10 mg Q4H PRN    hydrALAZINE tablet 100 mg Q8H    insulin aspart U-100 pen 0-10 Units QID (AC + HS) PRN    insulin aspart U-100 pen 5 Units TIDWM    insulin glargine U-100 (Lantus) pen 10 Units QHS    isosorbide mononitrate 24 hr tablet 60 mg Daily    lactated ringers infusion Continuous    melatonin tablet 6 mg Nightly PRN    mupirocin 2 % ointment BID    ondansetron injection 4 mg Q8H PRN    pantoprazole EC tablet 40 mg Daily    pneumoc 20-michael conj-dip cr(PF) (PREVNAR-20 (PF)) injection Syrg 0.5 mL vaccine x 1 dose    simethicone chewable tablet 80 mg TID PRN    sodium chloride 0.9% flush 10 mL PRN    sodium chloride 0.9% flush 10 mL PRN       Objective:     Vital Signs (Most Recent):  Temp: 98.2 °F (36.8 °C) (02/16/25 0800)  Pulse: (!) 40 (02/16/25 1000)  Resp: 20 (02/16/25 1000)  BP: 137/65 (02/16/25 1429)  SpO2: 99 % (02/16/25 1000) Vital Signs (24h Range):  Temp:  [97.8 °F (36.6 °C)-98.2 °F (36.8 °C)] 98.2 °F (36.8 °C)  Pulse:  [38-43] 40  Resp:  [15-36] 20  SpO2:  [97 %-100 %] 99 %  BP: (115-177)/(54-76) 137/65     Weight: 85.9 kg (189 lb 6 oz) (02/15/25 0900)  Body mass index is 27.97 kg/m².  Body surface area is 2.04 meters squared.    I/O last 3 completed shifts:  In: 1316.8 [P.O.:200; I.V.:1116.8]  Out: 320 [Urine:320]     Physical Exam  Vitals and nursing note reviewed.   Constitutional:       Appearance: Normal appearance.   HENT:      Head: Normocephalic.      Mouth/Throat:      Mouth: Mucous membranes are moist.   Eyes:      Extraocular Movements: Extraocular movements intact.      Conjunctiva/sclera: Conjunctivae normal.      Pupils: Pupils are equal, round, and reactive to light.   Cardiovascular:      Rate and Rhythm: Normal rate.      Pulses: Normal pulses.   Pulmonary:       Effort: Pulmonary effort is normal.      Breath sounds: Normal breath sounds.   Abdominal:      Palpations: Abdomen is soft.      Tenderness: There is abdominal tenderness.   Musculoskeletal:         General: Normal range of motion.      Cervical back: Normal range of motion and neck supple.      Right lower leg: No edema.      Left lower leg: No edema.   Skin:     General: Skin is warm.   Neurological:      General: No focal deficit present.      Mental Status: She is alert.   Psychiatric:         Mood and Affect: Mood normal.          Significant Labs:  CBC:   Recent Labs   Lab 02/15/25  0611   WBC 9.24   RBC 3.54*   HGB 10.5*   HCT 32.2*      MCV 91   MCH 29.7   MCHC 32.6     CMP:   Recent Labs   Lab 02/15/25  0611 02/16/25  1114   * 223*   CALCIUM 8.8 8.4*   ALBUMIN 3.3* 2.9*   PROT 7.4  --     133*   K 4.3 5.1   CO2 15* 11*    109   BUN 83* 91*   CREATININE 3.5* 3.8*   ALKPHOS 217*  --    ALT 42  --    AST 44*  --    BILITOT 0.2  --      All labs within the past 24 hours have been reviewed.     Significant Imaging:  Labs: Reviewed    Assessment/Plan:     KYA on CKD 3b  - baseline Cr 2.0, last at baseline 1/14/25  - Cr on arrival 3.5 --> 3.8 today, worsening, etiology likely due to bradycardia, appreciate cardiology recs  - Keep MAP > 65  - UPCR 0.9  - urine culture + GNR  - pt appears hypovolemia and subotimal UOP 320ml/2hrs  - agree with IVF, low threshold to stop IVF  -Keep hemoglobin > 7  -Strict ins and outs  -Avoid nephrotoxic agents if possible and renally dose medications  -Avoid drastic hemodynamic changes if possible   - daily labs     Anemia of CKD   - hgb 10.5 yesterday, at goal  - will trend     Metabolic Acidosis  -CO2 15 --> 11 , likely due to KYA  - will trend         Thank you for your consult. I will follow-up with patient. Please contact us if you have any additional questions.    ARMIDA Vickers  Nephrology  [facility] - Intensive Care

## 2025-02-16 NOTE — HOSPITAL COURSE
73-year-old female with history of diabetes, anxiety,HTN, HLD, CVA, MI, CAD s/p PCIs  CKD3b, HFpEF, and anemia admitted to ICU on 02/15/2025 for complete heart block and KYA.  Cardiology consulted- patient s/p ppm on 02/17.  Patient to start aspirin and Brilinta on 02/18.   Patient with chest pain and tachycardia overnight on 02/18. Found to have elevated troponin 1.9 and peaked at 3.3.  EKG noted and reviewed with NO STEMI and unchanged from previous on 2/17. Device interrogated - normal function. No PMT Discussed with cardiology. S/p heart catheterization on 02/19, [facility] indicated triple-vessel disease with patent stents and moderate LAD/RCA disease - no culprit identified . Continue medical Rx per cardiology. Nephrology consulted for KYA on CKD. Pt with severe nausea and vomiting, BUN >100. THDC placed on 02/21, and 1st HD session started on 02/21, 2nd session on 02/22 and plan for 3rd session on 02/24.  Patient was discharged home with home health to follow up with Cardiology, Nephrology and PCP.  Patient expressed concern about her heart rate which she attributed her anxiety; patient was counseled.  NC oxygen was weaned off before discharge.  Patient was instructed to come back to the hospital in the event of shortness of breaths, chest pain, or fever.  All questions were answered to her satisfaction and she verbalized understanding.

## 2025-02-16 NOTE — HPI
74 yo F with PMHx of DM2, Fibromyalgia, lymphoma s/p chemo, HTN, HLD, CVA, MI, CAD s/p PCIs CKD3b, HFpEF, and anemia presented to  ED via EMS c/o chest pain radiating to the back x 1 day. Vitals on arrival HR 45bpm. Labs on arrival  Cr 3.5, BNP 1115, EKG complet hear block. CXR normal. Pt given asprin and nitro by ems prior to arrival.     Nephrology consulted for KYA on CKD 3b    Prior records obtained and reviewed. Baseline Cr 2, last at baseline 1/14/25. Cr on arrival 3.5. Pt state she does not have a nephrologist. Pt states she was doing well prior to yesterday. She denies nsaid use, change in uop, n/v.

## 2025-02-16 NOTE — SUBJECTIVE & OBJECTIVE
Interval History: UOP 320ml. Pt reports vomiting and abdominal pain this am. + decrease appetite. No other complaints.     Review of patient's allergies indicates:   Allergen Reactions    Novolin 70/30 (semi-synthetic) Nausea And Vomiting     Severe vomiting on Relion 70/30    Sulfa (sulfonamide antibiotics) Anaphylaxis    Talwin [pentazocine lactate] Anaphylaxis    Victoza [liraglutide] Nausea And Vomiting    Glipizide Nausea Only    Codeine     Influenza virus vaccines Hives    Iodine and iodide containing products Hives    Levetiracetam Itching    Lyrica [pregabalin] Hallucinations    Neurontin [gabapentin]      Possible associated myoclonic jerk    Rituxan [rituximab] Hives    Zoloft [sertraline] Nausea And Vomiting     Current Facility-Administered Medications   Medication Frequency    acetaminophen tablet 650 mg Q4H PRN    albuterol-ipratropium 2.5 mg-0.5 mg/3 mL nebulizer solution 3 mL Q4H PRN    atorvastatin tablet 80 mg QHS    dextrose 50% injection 12.5 g PRN    dextrose 50% injection 25 g PRN    FLUoxetine capsule 40 mg Daily    furosemide tablet 40 mg BID    glucagon (human recombinant) injection 1 mg PRN    glucose chewable tablet 16 g PRN    glucose chewable tablet 24 g PRN    hydrALAZINE injection 10 mg Q4H PRN    hydrALAZINE tablet 100 mg Q8H    insulin aspart U-100 pen 0-10 Units QID (AC + HS) PRN    insulin aspart U-100 pen 5 Units TIDWM    insulin glargine U-100 (Lantus) pen 10 Units QHS    isosorbide mononitrate 24 hr tablet 60 mg Daily    lactated ringers infusion Continuous    melatonin tablet 6 mg Nightly PRN    mupirocin 2 % ointment BID    ondansetron injection 4 mg Q8H PRN    pantoprazole EC tablet 40 mg Daily    pneumoc 20-michael conj-dip cr(PF) (PREVNAR-20 (PF)) injection Syrg 0.5 mL vaccine x 1 dose    simethicone chewable tablet 80 mg TID PRN    sodium chloride 0.9% flush 10 mL PRN    sodium chloride 0.9% flush 10 mL PRN       Objective:     Vital Signs (Most Recent):  Temp: 98.2 °F (36.8  °C) (02/16/25 0800)  Pulse: (!) 40 (02/16/25 1000)  Resp: 20 (02/16/25 1000)  BP: 137/65 (02/16/25 1429)  SpO2: 99 % (02/16/25 1000) Vital Signs (24h Range):  Temp:  [97.8 °F (36.6 °C)-98.2 °F (36.8 °C)] 98.2 °F (36.8 °C)  Pulse:  [38-43] 40  Resp:  [15-36] 20  SpO2:  [97 %-100 %] 99 %  BP: (115-177)/(54-76) 137/65     Weight: 85.9 kg (189 lb 6 oz) (02/15/25 0900)  Body mass index is 27.97 kg/m².  Body surface area is 2.04 meters squared.    I/O last 3 completed shifts:  In: 1316.8 [P.O.:200; I.V.:1116.8]  Out: 320 [Urine:320]     Physical Exam  Vitals and nursing note reviewed.   Constitutional:       Appearance: Normal appearance.   HENT:      Head: Normocephalic.      Mouth/Throat:      Mouth: Mucous membranes are moist.   Eyes:      Extraocular Movements: Extraocular movements intact.      Conjunctiva/sclera: Conjunctivae normal.      Pupils: Pupils are equal, round, and reactive to light.   Cardiovascular:      Rate and Rhythm: Normal rate.      Pulses: Normal pulses.   Pulmonary:      Effort: Pulmonary effort is normal.      Breath sounds: Normal breath sounds.   Abdominal:      Palpations: Abdomen is soft.      Tenderness: There is abdominal tenderness.   Musculoskeletal:         General: Normal range of motion.      Cervical back: Normal range of motion and neck supple.      Right lower leg: No edema.      Left lower leg: No edema.   Skin:     General: Skin is warm.   Neurological:      General: No focal deficit present.      Mental Status: She is alert.   Psychiatric:         Mood and Affect: Mood normal.          Significant Labs:  CBC:   Recent Labs   Lab 02/15/25  0611   WBC 9.24   RBC 3.54*   HGB 10.5*   HCT 32.2*      MCV 91   MCH 29.7   MCHC 32.6     CMP:   Recent Labs   Lab 02/15/25  0611 02/16/25  1114   * 223*   CALCIUM 8.8 8.4*   ALBUMIN 3.3* 2.9*   PROT 7.4  --     133*   K 4.3 5.1   CO2 15* 11*    109   BUN 83* 91*   CREATININE 3.5* 3.8*   ALKPHOS 217*  --    ALT 42  --     AST 44*  --    BILITOT 0.2  --      All labs within the past 24 hours have been reviewed.     Significant Imaging:  Labs: Reviewed

## 2025-02-16 NOTE — ASSESSMENT & PLAN NOTE
KYA is likely due to pre-renal azotemia due to intravascular volume depletion. Baseline creatinine is  1.6 to 2.2 . Most recent creatinine and eGFR are listed below.  Recent Labs     02/15/25  0611 02/16/25  1114   CREATININE 3.5* 3.8*   EGFRNORACEVR 13* 12*      Plan  - KYA is worsening. Will adjust treatment as follows:  IVF  - Avoid nephrotoxins and renally dose meds for GFR listed above  - Monitor urine output, serial BMP, and adjust therapy as needed  - nephrology consulted; input appreciated

## 2025-02-16 NOTE — SUBJECTIVE & OBJECTIVE
Past Medical History:   Diagnosis Date    Age-related osteoporosis with current pathological fracture with routine healing 11/13/2024    Allergy     Altered mental status 06/19/2022    DYSARTHRIA, SPASTIC MOVEMENTS & DIFFICULTY SWALLOWING    Anemia     Anxiety     Arthritis     Cataract     both removed    Colon polyps     Coronary artery disease     Depression     Diabetes mellitus, type II     Disorder of kidney and ureter     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/2023    Fibromyalgia     Follicular lymphoma     GERD (gastroesophageal reflux disease)     HTN (hypertension)     Hyperlipidemia     MI (myocardial infarction) 03/2019    Personal history of colonic polyps     Restless leg syndrome     Stroke        Past Surgical History:   Procedure Laterality Date    APPLICATION OF WOUND VACUUM-ASSISTED CLOSURE DEVICE Right 9/25/2024    Procedure: APPLICATION, WOUND VAC;  Surgeon: Neto Davalos MD;  [facility];  Service: Orthopedics;  Laterality: Right;    COLONOSCOPY  11/07/2012    Colon polyp found; repeat in 5 years    COLONOSCOPY N/A 11/4/2024    Procedure: COLONOSCOPY;  Surgeon: David Castaneda MD;  Location: [facility] (2ND FLR);  Service: Endoscopy;  Laterality: N/A;    DEBRIDEMENT OF LOCAL FLAP Right 9/25/2024    Procedure: DEBRIDEMENT, LOCAL FLAP;  Surgeon: Neto Davalos MD;  Location: [facility] OR [facility] FLR;  Service: Orthopedics;  Laterality: Right;    ELBOW SURGERY Right 2015    dislocation repair     ESOPHAGOGASTRODUODENOSCOPY  11/07/2012    atrophic gastritis, H pylori testing negative    INCISION AND DRAINAGE FOOT Right 6/2/2021    Procedure: INCISION AND DRAINAGE, FOOT, bone biopsy;  Surgeon: Quiana Penn DPM;  Location: [facility] OR;  Service: Podiatry;  Laterality: Right;    IRRIGATION AND DEBRIDEMENT OF LOWER EXTREMITY Right 9/25/2024    Procedure: IRRIGATION AND DEBRIDEMENT, LOWER EXTREMITY; slider table, supine, bone foam, cysto tubing, 6L NS/dakins/peroxide, culture  swabs;  Surgeon: Neto Davalos MD;  [facility];  Service: Orthopedics;  Laterality: Right;    KNEE SURGERY Bilateral 2015    scoped    LEFT HEART CATHETERIZATION Left 3/29/2019    Procedure: Left heart cath;  Surgeon: Bladimir Barbosa MD;  Location: [facility] CATH LAB;  Service: Cardiology;  Laterality: Left;    LEFT HEART CATHETERIZATION Left 11/18/2019    Procedure: Left heart cath;  Surgeon: Karl Rico MD;  Location: [facility] CATH LAB;  Service: Cardiology;  Laterality: Left;    LEFT HEART CATHETERIZATION Left 1/8/2020    Procedure: Left heart cath, right radial, noon start;  Surgeon: Christos Monreal MD;  Location: [facility] CATH LAB;  Service: Cardiology;  Laterality: Left;  RN Pre Op 1-6-20.  To be admitted 1-7-20 sor Aspirin Disensitation    OPEN REDUCTION AND INTERNAL FIXATION (ORIF) OF FRACTURE OF ACETABULUM Right 8/16/2024    Procedure: ORIF, FRACTURE, ACETABULUM;  Surgeon: Neto Davalos MD;  Location: [address];  Service: Orthopedics;  Laterality: Right;  anterior and lateral pelvic incisions    OPEN REDUCTION AND INTERNAL FIXATION (ORIF) OF PILON FRACTURE Right 8/14/2024    Procedure: ORIF, FRACTURE, PILON;  Surgeon: Neto Davalos MD;  Location: [address];  Service: Orthopedics;  Laterality: Right;    TONSILLECTOMY  1955    ULTRASOUND GUIDANCE  1/8/2020    Procedure: Ultrasound Guidance;  Surgeon: Christos Monreal MD;  Location: [facility] CATH LAB;  Service: Cardiology;;       Review of patient's allergies indicates:   Allergen Reactions    Novolin 70/30 (semi-synthetic) Nausea And Vomiting     Severe vomiting on Relion 70/30    Sulfa (sulfonamide antibiotics) Anaphylaxis    Talwin [pentazocine lactate] Anaphylaxis    Victoza [liraglutide] Nausea And Vomiting    Glipizide Nausea Only    Codeine     Influenza virus vaccines Hives    Iodine and iodide containing products Hives    Levetiracetam Itching    Lyrica [pregabalin] Hallucinations    Neurontin  [gabapentin]      Possible associated myoclonic jerk    Rituxan [rituximab] Hives    Zoloft [sertraline] Nausea And Vomiting     Current Facility-Administered Medications   Medication Frequency    0.9% NaCl infusion Continuous    acetaminophen tablet 650 mg Q4H PRN    albuterol-ipratropium 2.5 mg-0.5 mg/3 mL nebulizer solution 3 mL Q4H PRN    atorvastatin tablet 80 mg QHS    dextrose 50% injection 12.5 g PRN    dextrose 50% injection 25 g PRN    FLUoxetine capsule 40 mg Daily    furosemide tablet 40 mg BID    glucagon (human recombinant) injection 1 mg PRN    glucose chewable tablet 16 g PRN    glucose chewable tablet 24 g PRN    hydrALAZINE injection 10 mg Q4H PRN    hydrALAZINE tablet 100 mg Q8H    insulin aspart U-100 pen 0-10 Units QID (AC + HS) PRN    insulin glargine U-100 (Lantus) pen 10 Units QHS    isosorbide mononitrate 24 hr tablet 60 mg Daily    melatonin tablet 6 mg Nightly PRN    mupirocin 2 % ointment BID    ondansetron injection 4 mg Q8H PRN    pantoprazole EC tablet 40 mg Daily    pneumoc 20-michael conj-dip cr(PF) (PREVNAR-20 (PF)) injection Syrg 0.5 mL vaccine x 1 dose    simethicone chewable tablet 80 mg TID PRN    sodium chloride 0.9% flush 10 mL PRN    sodium chloride 0.9% flush 10 mL PRN     Family History       Problem Relation (Age of Onset)    Allergy (severe) Daughter    Cancer Mother, Father    Diabetes Sister    Heart disease Mother    Hypertension Sister    Lung cancer Brother    No Known Problems Daughter          Tobacco Use    Smoking status: Never    Smokeless tobacco: Never   Substance and Sexual Activity    Alcohol use: Not Currently    Drug use: Never    Sexual activity: Not Currently     Partners: Male     Review of Systems   Constitutional:  Positive for fatigue. Negative for chills.   HENT:  Negative for congestion.    Respiratory:  Negative for chest tightness and shortness of breath.    Gastrointestinal:  Negative for abdominal pain.   Genitourinary:  Negative for decreased  urine volume.   Musculoskeletal:  Negative for back pain.   Skin:  Negative for color change.   Hematological:  Negative for adenopathy.   Psychiatric/Behavioral:  Negative for confusion.      Objective:     Vital Signs (Most Recent):  Temp: 97.8 °F (36.6 °C) (02/15/25 1600)  Pulse: (!) 42 (02/15/25 1815)  Resp: 20 (02/15/25 1815)  BP: 135/62 (02/15/25 1815)  SpO2: 99 % (02/15/25 1815) Vital Signs (24h Range):  Temp:  [97.2 °F (36.2 °C)-98.4 °F (36.9 °C)] 97.8 °F (36.6 °C)  Pulse:  [40-45] 42  Resp:  [14-36] 20  SpO2:  [98 %-100 %] 99 %  BP: (114-197)/(54-84) 135/62     Weight: 85.9 kg (189 lb 6 oz) (02/15/25 0900)  Body mass index is 27.97 kg/m².  Body surface area is 2.04 meters squared.    No intake/output data recorded.     Physical Exam  Vitals reviewed.   Constitutional:       Appearance: Normal appearance.   HENT:      Head: Normocephalic.      Nose: Nose normal.   Eyes:      Extraocular Movements: Extraocular movements intact.      Conjunctiva/sclera: Conjunctivae normal.      Pupils: Pupils are equal, round, and reactive to light.   Cardiovascular:      Rate and Rhythm: Normal rate.   Pulmonary:      Effort: Pulmonary effort is normal.      Breath sounds: Normal breath sounds.   Abdominal:      General: Bowel sounds are normal.      Palpations: Abdomen is soft.      Tenderness: There is no abdominal tenderness.   Musculoskeletal:      Cervical back: Normal range of motion.      Right lower leg: Edema (trace) present.      Left lower leg: Edema (trace) present.   Skin:     General: Skin is warm.   Neurological:      General: No focal deficit present.      Mental Status: She is alert. Mental status is at baseline.   Psychiatric:         Thought Content: Thought content normal.          Significant Labs:  CBC:   Recent Labs   Lab 02/15/25  0611   WBC 9.24   RBC 3.54*   HGB 10.5*   HCT 32.2*      MCV 91   MCH 29.7   MCHC 32.6     CMP:   Recent Labs   Lab 02/15/25  0611   *   CALCIUM 8.8   ALBUMIN  3.3*   PROT 7.4      K 4.3   CO2 15*      BUN 83*   CREATININE 3.5*   ALKPHOS 217*   ALT 42   AST 44*   BILITOT 0.2     All labs within the past 24 hours have been reviewed.    Significant Imaging:  Labs: Reviewed  ECG: Reviewed  X-Ray: Reviewed  Echo: Reviewed

## 2025-02-16 NOTE — PROGRESS NOTES
[facility]  Progress Note    Patient Name: Lorena Contreras  MRN: [id number]  Patient Class: IP- Inpatient   Admission Date: 2/15/2025  Length of Stay: 1 days  Attending Physician: Germaine Murray MD  Primary Care Provider: Jorge Paris MD        Subjective     Principal Problem:Complete heart block        HPI:  A pleasant  74 yo F with PMHx of  DM2, Fibromyalgia, lymphoma s/p chemo, HTN, HLD, CVA, MI, CAD s/p PCIs  CKD3b, HFpEF, and anemia who presented to the ED with a chief complaint of chest pain radiating to the back with onset a day before presentation.    Pain was said to be sudden in onset; no associated nausea or diaphoresis.  No Preceding PND orthopnea or leg swelling; pain was described as different from her previous coronary events.  Patient however endorsed dizziness but this has been going on for some time.  She denied any syncopal event  Symptoms progress and this prompted patient to come to the ED    Retrospective chart review indicates multiple ED visits for observation for atypical chest pain.  Cardiac history is significant for CAD with JESIKA x 2 to RCA 3/29/19 - JESIKA to RI and Cx 1/8/20      On presentation to the ED; patient was bradycardic to 32 BPM which subsequently improved and mildly hypertensive.  EKG showed complete heart block.  Patient was admitted to the ICU for further management.    Overview/Hospital Course:  74 yo F with PMHx of  DM2, Fibromyalgia, lymphoma s/p chemo, HTN, HLD, CVA, MI, CAD s/p PCIs  CKD3b, HFpEF, and anemia who presented to the ED with a chief complaint of chest pain radiating to the back with onset a day before presentation admitted for complete heart block and KYA.  Patient is scheduled for ppm on 02/17 per Cardiology.    Interval History:  Patient is still bradycardic on telemetry overnight with minimum heart rate of 36; still bradycardic.  Worsening KYA with creatinine of 3.8.    Review of Systems   Constitutional:   Negative for activity change, diaphoresis and fatigue.   Respiratory:  Negative for cough, choking and shortness of breath.    Cardiovascular:  Negative for chest pain, palpitations and leg swelling.     Objective:     Vital Signs (Most Recent):  Temp: 98.2 °F (36.8 °C) (02/16/25 0800)  Pulse: (!) 40 (02/16/25 1000)  Resp: 20 (02/16/25 1000)  BP: 136/60 (02/16/25 1000)  SpO2: 99 % (02/16/25 1000) Vital Signs (24h Range):  Temp:  [97.8 °F (36.6 °C)-98.4 °F (36.9 °C)] 98.2 °F (36.8 °C)  Pulse:  [38-44] 40  Resp:  [15-36] 20  SpO2:  [97 %-100 %] 99 %  BP: (115-177)/(54-84) 136/60     Weight: 85.9 kg (189 lb 6 oz)  Body mass index is 27.97 kg/m².    Intake/Output Summary (Last 24 hours) at 2/16/2025 1137  Last data filed at 2/16/2025 1100  Gross per 24 hour   Intake 1351.73 ml   Output 320 ml   Net 1031.73 ml         Physical Exam  Constitutional:       General: She is not in acute distress.     Appearance: Normal appearance. She is ill-appearing. She is not toxic-appearing or diaphoretic.   Cardiovascular:      Rate and Rhythm: Regular rhythm. Bradycardia present.      Pulses: Normal pulses.      Heart sounds: Normal heart sounds. No murmur heard.     No gallop.   Pulmonary:      Effort: Pulmonary effort is normal. No respiratory distress.      Breath sounds: Normal breath sounds. No stridor.   Abdominal:      General: Abdomen is flat. There is no distension.      Palpations: Abdomen is soft.      Tenderness: There is no abdominal tenderness.   Neurological:      General: No focal deficit present.      Mental Status: She is alert and oriented to person, place, and time.             Significant Labs: CBC:   Recent Labs   Lab 02/15/25  0611   WBC 9.24   HGB 10.5*   HCT 32.2*        CMP:   Recent Labs   Lab 02/15/25  0611 02/16/25  1114    133*   K 4.3 5.1    109   CO2 15* 11*   * 223*   BUN 83* 91*   CREATININE 3.5* 3.8*   CALCIUM 8.8 8.4*   PROT 7.4  --    ALBUMIN 3.3* 2.9*   BILITOT 0.2  --     ALKPHOS 217*  --    AST 44*  --    ALT 42  --    ANIONGAP 14 13       Significant Imaging: I have reviewed all pertinent imaging results/findings within the past 24 hours.    Assessment and Plan     * Complete heart block  Hx of chest pain on presentation; different from previous anginal pains  EKG showed complete heart block  Troponin not elevated  Discontinue all AV frances blocking agents  Monitor patient on telemetry  NPO after midnight on Sunday; for ppm on 2/17      Acute kidney injury superimposed on stage 3b chronic kidney disease  KYA is likely due to pre-renal azotemia due to intravascular volume depletion. Baseline creatinine is  1.6 to 2.2 . Most recent creatinine and eGFR are listed below.  Recent Labs     02/15/25  0611 02/16/25  1114   CREATININE 3.5* 3.8*   EGFRNORACEVR 13* 12*      Plan  - KYA is worsening. Will adjust treatment as follows:  IVF  - Avoid nephrotoxins and renally dose meds for GFR listed above  - Monitor urine output, serial BMP, and adjust therapy as needed  - nephrology consulted; input appreciated    Diabetes mellitus  Patient's FSGs are controlled on current medication regimen.  Last A1c reviewed-   Lab Results   Component Value Date    HGBA1C 5.9 (H) 01/14/2025     Most recent fingerstick glucose reviewed-   Recent Labs   Lab 02/15/25  1234 02/15/25  1804 02/15/25  2154 02/16/25  0729   POCTGLUCOSE 212* 246* 226* 269*     Current correctional scale  Medium  Maintain anti-hyperglycemic dose as follows-   Antihyperglycemics (From admission, onward)      Start     Stop Route Frequency Ordered    02/16/25 1645  insulin aspart U-100 pen 5 Units         -- SubQ 3 times daily with meals 02/16/25 1146    02/15/25 2100  insulin glargine U-100 (Lantus) pen 10 Units         -- SubQ Nightly 02/15/25 1257    02/15/25 1358  insulin aspart U-100 pen 0-10 Units         -- SubQ Before meals & nightly PRN 02/15/25 1258          Hold Oral hypoglycemics while patient is in the hospital.; cardiac  consistent carbohydrate diet; monitor blood glucose log and titrate to effect  Prandial aspart added    Stage 3b chronic kidney disease  Creatine stable for now. BMP reviewed- noted Estimated Creatinine Clearance: 15.2 mL/min (A) (based on SCr of 3.8 mg/dL (H)). according to latest data. Based on current GFR, CKD stage is stage 4 - GFR 15-29.  Monitor UOP and serial BMP and adjust therapy as needed. Renally dose meds. Avoid nephrotoxic medications and procedures.    Aortic stenosis  Echocardiogram with evidence of aortic stenosis that is moderate . The patient's most recent echocardiogram result is listed below. We will manage the valvular abnormality by avoiding volume overload; and cardiology follow up as an outpatient    Echo  Result Date: 2/15/2025    Left Ventricle: The left ventricle is normal in size. There is moderate   concentric hypertrophy. There is hyperdynamic systolic function with a   visually estimated ejection fraction of 70 - 75%.    Right Ventricle: Normal right ventricular cavity size. Systolic   function is normal.    Left Atrium: Left atrium is moderately dilated.    Right Atrium: Right atrium is mildly dilated.    Aortic Valve: There is moderate stenosis. Aortic valve area by VTI is   0.8 cm². Aortic valve peak velocity is 3.4 m/s. Mean gradient is 27 mmHg.   The dimensionless index is 0.27.    Mitral Valve: There is mild stenosis. The mean pressure gradient across   the mitral valve is 5 mmHg at a heart rate of 42  bpm. There is mild   regurgitation.    Tricuspid Valve: There is moderate regurgitation.    Pulmonary Artery: The estimated pulmonary artery systolic pressure is   49 mmHg.    IVC/SVC: Intermediate venous pressure at 8 mmHg.        Echo  Result Date: 10/31/2024    Left Ventricle: The left ventricle is normal in size. Normal wall   thickness. Normal wall motion. There is normal systolic function with a   visually estimated ejection fraction of 60 - 65%. Grade II diastolic    dysfunction. Elevated left ventricular filling pressure.    Right Ventricle: Normal right ventricular cavity size. Wall thickness   is normal. Systolic function is normal.    Left Atrium: Left atrium is severely dilated.    Aortic Valve: There is moderate aortic valve sclerosis. Moderately   restricted motion. There is moderate stenosis. Aortic valve area by VTI is   1.1 cm2. Aortic valve peak velocity is 3.1 m/s. Mean gradient is 20.1   mmHg. The dimensionless index is 0.37.    Mitral Valve: There is mild regurgitation.    Tricuspid Valve: There is mild regurgitation.    Pulmonary Artery: There is pulmonary hypertension. The estimated   pulmonary artery systolic pressure is 46 mmHg.    IVC/SVC: Normal venous pressure at 3 mmHg.           Coronary artery disease of native artery of native heart with stable angina pectoris  Patient with known CAD s/p stent placement and CABG, which is controlled Will continue Statin; hold aspirin and Plavix setting of impending pacing and monitor for S/Sx of angina/ACS. Continue to monitor on telemetry.   -repeat ischemic evaluation of after PPM    Mixed hyperlipidemia  Continue home statin        VTE Risk Mitigation (From admission, onward)           Ordered     IP VTE HIGH RISK PATIENT  Once         02/15/25 1252     Place sequential compression device  Until discontinued         02/15/25 1252     Place sequential compression device  Until discontinued         02/15/25 0802                    Discharge Planning   MIKHAIL:      Code Status: Full Code   Medical Readiness for Discharge Date:                Critical care time spent on the evaluation and treatment of severe organ dysfunction, review of pertinent labs and imaging studies, discussions with consulting providers and discussions with patient/family: 30 minutes.            Germaine Murray MD  Department of Hospital Medicine   [facility] - Intensive Care

## 2025-02-16 NOTE — SUBJECTIVE & OBJECTIVE
Interval History:     Review of Systems   Constitutional: Negative for decreased appetite.   HENT:  Negative for ear discharge.    Eyes:  Negative for blurred vision.   Respiratory:  Negative for hemoptysis.    Endocrine: Negative for polyphagia.   Hematologic/Lymphatic: Negative for adenopathy.   Skin:  Negative for color change.   Musculoskeletal:  Negative for joint swelling.   Genitourinary:  Negative for bladder incontinence.   Neurological:  Negative for brief paralysis.   Psychiatric/Behavioral:  Negative for hallucinations.    Allergic/Immunologic: Negative for hives.     Objective:     Vital Signs (Most Recent):  Temp: 98 °F (36.7 °C) (02/16/25 0400)  Pulse: (!) 38 (02/16/25 0600)  Resp: 16 (02/16/25 0600)  BP: (!) 148/64 (02/16/25 0623)  SpO2: 100 % (02/16/25 0815) Vital Signs (24h Range):  Temp:  [97.8 °F (36.6 °C)-98.4 °F (36.9 °C)] 98 °F (36.7 °C)  Pulse:  [38-44] 38  Resp:  [16-34] 16  SpO2:  [97 %-100 %] 100 %  BP: (115-177)/(54-84) 148/64     Weight: 85.9 kg (189 lb 6 oz)  Body mass index is 27.97 kg/m².     SpO2: 100 %         Intake/Output Summary (Last 24 hours) at 2/16/2025 1042  Last data filed at 2/16/2025 0600  Gross per 24 hour   Intake 1150.24 ml   Output 320 ml   Net 830.24 ml       Lines/Drains/Airways       Drain  Duration             Female External Urinary Catheter w/ Suction 02/15/25 0011 1 day              Peripheral Intravenous Line  Duration                  Peripheral IV - Single Lumen 02/15/25 0610 20 G Anterior;Right Forearm 1 day         Peripheral IV - Single Lumen 02/15/25 0843 20 G Left Antecubital 1 day                       Physical Exam  Constitutional:       Appearance: She is well-developed.   HENT:      Head: Normocephalic and atraumatic.   Eyes:      Conjunctiva/sclera: Conjunctivae normal.      Pupils: Pupils are equal, round, and reactive to light.   Cardiovascular:      Rate and Rhythm: Bradycardia present.      Pulses: Intact distal pulses.      Heart sounds:  Normal heart sounds.   Pulmonary:      Effort: Pulmonary effort is normal.      Breath sounds: Normal breath sounds.   Abdominal:      General: Bowel sounds are normal.      Palpations: Abdomen is soft.   Musculoskeletal:         General: Normal range of motion.      Cervical back: Normal range of motion and neck supple.   Skin:     General: Skin is warm and dry.   Neurological:      Mental Status: She is alert and oriented to person, place, and time.            Significant Labs: All pertinent lab results from the last 24 hours have been reviewed.    Significant Imaging: Echocardiogram: 2D echo with color flow doppler: No results found. However, due to the size of the patient record, not all encounters were searched. Please check Results Review for a complete set of results.

## 2025-02-16 NOTE — ASSESSMENT & PLAN NOTE
New Dx. Hold rate lowering medications. Hold ASA/brilinta. BP adequate no need for TVP at  this point. Will plan PPM Monday if CHB persists. EF normal on echo    2/16/25 Still with CHB. Dual PPM tomorrow - risks/benefits explained and she agrees to proceed

## 2025-02-16 NOTE — CONSULTS
[facility] - Intensive Care  Nephrology  Consult Note    Patient Name: Lorena Contreras  MRN: [id number]  Admission Date: 2/15/2025  Hospital Length of Stay: 0 days  Attending Provider: Germaine Murray MD   Primary Care Physician: Jorge Paris MD  Principal Problem:Complete heart block    Nephrology  Consult performed by: Meg William PA  Consult ordered by: López Soler MD  Reason for consult: KYA on CKD 3B        Subjective:     HPI: 74 yo F with PMHx of DM2, Fibromyalgia, lymphoma s/p chemo, HTN, HLD, CVA, MI, CAD s/p PCIs CKD3b, HFpEF, and anemia presented to  ED via EMS c/o chest pain radiating to the back x 1 day. Vitals on arrival HR 45bpm. Labs on arrival  Cr 3.5, BNP 1115, EKG complet hear block. CXR normal. Pt given asprin and nitro by ems prior to arrival.     Nephrology consulted for KYA on CKD 3b    Prior records obtained and reviewed. Baseline Cr 2, last at baseline 1/14/25. Cr on arrival 3.5. Pt state she does not have a nephrologist. Pt states she was doing well prior to yesterday. She denies nsaid use, change in uop, n/v.     Past Medical History:   Diagnosis Date    Age-related osteoporosis with current pathological fracture with routine healing 11/13/2024    Allergy     Altered mental status 06/19/2022    DYSARTHRIA, SPASTIC MOVEMENTS & DIFFICULTY SWALLOWING    Anemia     Anxiety     Arthritis     Cataract     both removed    Colon polyps     Coronary artery disease     Depression     Diabetes mellitus, type II     Disorder of kidney and ureter     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/2023    Fibromyalgia     Follicular lymphoma     GERD (gastroesophageal reflux disease)     HTN (hypertension)     Hyperlipidemia     MI (myocardial infarction) 03/2019    Personal history of colonic polyps     Restless leg syndrome     Stroke        Past Surgical History:   Procedure Laterality Date    APPLICATION OF WOUND VACUUM-ASSISTED CLOSURE DEVICE Right 9/25/2024    Procedure: APPLICATION,  WOUND VAC;  Surgeon: Neto Davalos MD;  Location: [facility] OR 2ND FLR;  Service: Orthopedics;  Laterality: Right;    COLONOSCOPY  11/07/2012    Colon polyp found; repeat in 5 years    COLONOSCOPY N/A 11/4/2024    Procedure: COLONOSCOPY;  Surgeon: David Castaneda MD;  Location: [facility] ENDO (2ND FLR);  Service: Endoscopy;  Laterality: N/A;    DEBRIDEMENT OF LOCAL FLAP Right 9/25/2024    Procedure: DEBRIDEMENT, LOCAL FLAP;  Surgeon: Neto Davalos MD;  Location: [facility] OR 2ND FLR;  Service: Orthopedics;  Laterality: Right;    ELBOW SURGERY Right 2015    dislocation repair     ESOPHAGOGASTRODUODENOSCOPY  11/07/2012    atrophic gastritis, H pylori testing negative    INCISION AND DRAINAGE FOOT Right 6/2/2021    Procedure: INCISION AND DRAINAGE, FOOT, bone biopsy;  Surgeon: Quiana Penn DPM;  Location: [facility];  Service: Podiatry;  Laterality: Right;    IRRIGATION AND DEBRIDEMENT OF LOWER EXTREMITY Right 9/25/2024    Procedure: IRRIGATION AND DEBRIDEMENT, LOWER EXTREMITY; slider table, supine, bone foam, cysto tubing, 6L NS/dakins/peroxide, culture swabs;  Surgeon: Neto Davalos MD;  Location: [facility] OR 2ND FLR;  Service: Orthopedics;  Laterality: Right;    KNEE SURGERY Bilateral 2015    scoped    LEFT HEART CATHETERIZATION Left 3/29/2019    Procedure: Left heart cath;  Surgeon: Bladimir Barbosa MD;  Location: [facility] CATH LAB;  Service: Cardiology;  Laterality: Left;    LEFT HEART CATHETERIZATION Left 11/18/2019    Procedure: Left heart cath;  Surgeon: Karl Rico MD;  Location: [facility] CATH LAB;  Service: Cardiology;  Laterality: Left;    LEFT HEART CATHETERIZATION Left 1/8/2020    Procedure: Left heart cath, right radial, noon start;  Surgeon: Christos Monreal MD;  Location: [facility] CATH LAB;  Service: Cardiology;  Laterality: Left;  RN Pre Op 1-6-20.  To be admitted 1-7-20 sor Aspirin Disensitation    OPEN REDUCTION AND INTERNAL FIXATION (ORIF) OF FRACTURE OF ACETABULUM Right 8/16/2024     Procedure: ORIF, FRACTURE, ACETABULUM;  Surgeon: Neto Davalos MD;  Location: [facility] OR 2ND FLR;  Service: Orthopedics;  Laterality: Right;  anterior and lateral pelvic incisions    OPEN REDUCTION AND INTERNAL FIXATION (ORIF) OF PILON FRACTURE Right 8/14/2024    Procedure: ORIF, FRACTURE, PILON;  Surgeon: Neto Davalos MD;  Location: [facility] OR 2ND FLR;  Service: Orthopedics;  Laterality: Right;    TONSILLECTOMY  1955    ULTRASOUND GUIDANCE  1/8/2020    Procedure: Ultrasound Guidance;  Surgeon: Christos Monreal MD;  Location: [facility] CATH LAB;  Service: Cardiology;;       Review of patient's allergies indicates:   Allergen Reactions    Novolin 70/30 (semi-synthetic) Nausea And Vomiting     Severe vomiting on Relion 70/30    Sulfa (sulfonamide antibiotics) Anaphylaxis    Talwin [pentazocine lactate] Anaphylaxis    Victoza [liraglutide] Nausea And Vomiting    Glipizide Nausea Only    Codeine     Influenza virus vaccines Hives    Iodine and iodide containing products Hives    Levetiracetam Itching    Lyrica [pregabalin] Hallucinations    Neurontin [gabapentin]      Possible associated myoclonic jerk    Rituxan [rituximab] Hives    Zoloft [sertraline] Nausea And Vomiting     Current Facility-Administered Medications   Medication Frequency    0.9% NaCl infusion Continuous    acetaminophen tablet 650 mg Q4H PRN    albuterol-ipratropium 2.5 mg-0.5 mg/3 mL nebulizer solution 3 mL Q4H PRN    atorvastatin tablet 80 mg QHS    dextrose 50% injection 12.5 g PRN    dextrose 50% injection 25 g PRN    FLUoxetine capsule 40 mg Daily    furosemide tablet 40 mg BID    glucagon (human recombinant) injection 1 mg PRN    glucose chewable tablet 16 g PRN    glucose chewable tablet 24 g PRN    hydrALAZINE injection 10 mg Q4H PRN    hydrALAZINE tablet 100 mg Q8H    insulin aspart U-100 pen 0-10 Units QID (AC + HS) PRN    insulin glargine U-100 (Lantus) pen 10 Units QHS    isosorbide mononitrate 24 hr tablet 60 mg Daily     melatonin tablet 6 mg Nightly PRN    mupirocin 2 % ointment BID    ondansetron injection 4 mg Q8H PRN    pantoprazole EC tablet 40 mg Daily    pneumoc 20-michael conj-dip cr(PF) (PREVNAR-20 (PF)) injection Syrg 0.5 mL vaccine x 1 dose    simethicone chewable tablet 80 mg TID PRN    sodium chloride 0.9% flush 10 mL PRN    sodium chloride 0.9% flush 10 mL PRN     Family History       Problem Relation (Age of Onset)    Allergy (severe) Daughter    Cancer Mother, Father    Diabetes Sister    Heart disease Mother    Hypertension Sister    Lung cancer Brother    No Known Problems Daughter          Tobacco Use    Smoking status: Never    Smokeless tobacco: Never   Substance and Sexual Activity    Alcohol use: Not Currently    Drug use: Never    Sexual activity: Not Currently     Partners: Male     Review of Systems   Constitutional:  Positive for fatigue. Negative for chills.   HENT:  Negative for congestion.    Respiratory:  Negative for chest tightness and shortness of breath.    Gastrointestinal:  Negative for abdominal pain.   Genitourinary:  Negative for decreased urine volume.   Musculoskeletal:  Negative for back pain.   Skin:  Negative for color change.   Hematological:  Negative for adenopathy.   Psychiatric/Behavioral:  Negative for confusion.      Objective:     Vital Signs (Most Recent):  Temp: 97.8 °F (36.6 °C) (02/15/25 1600)  Pulse: (!) 42 (02/15/25 1815)  Resp: 20 (02/15/25 1815)  BP: 135/62 (02/15/25 1815)  SpO2: 99 % (02/15/25 1815) Vital Signs (24h Range):  Temp:  [97.2 °F (36.2 °C)-98.4 °F (36.9 °C)] 97.8 °F (36.6 °C)  Pulse:  [40-45] 42  Resp:  [14-36] 20  SpO2:  [98 %-100 %] 99 %  BP: (114-197)/(54-84) 135/62     Weight: 85.9 kg (189 lb 6 oz) (02/15/25 0900)  Body mass index is 27.97 kg/m².  Body surface area is 2.04 meters squared.    No intake/output data recorded.     Physical Exam  Vitals reviewed.   Constitutional:       Appearance: Normal appearance.   HENT:      Head: Normocephalic.      Nose: Nose  normal.   Eyes:      Extraocular Movements: Extraocular movements intact.      Conjunctiva/sclera: Conjunctivae normal.      Pupils: Pupils are equal, round, and reactive to light.   Cardiovascular:      Rate and Rhythm: Normal rate.   Pulmonary:      Effort: Pulmonary effort is normal.      Breath sounds: Normal breath sounds.   Abdominal:      General: Bowel sounds are normal.      Palpations: Abdomen is soft.      Tenderness: There is no abdominal tenderness.   Musculoskeletal:      Cervical back: Normal range of motion.      Right lower leg: Edema (trace) present.      Left lower leg: Edema (trace) present.   Skin:     General: Skin is warm.   Neurological:      General: No focal deficit present.      Mental Status: She is alert. Mental status is at baseline.   Psychiatric:         Thought Content: Thought content normal.          Significant Labs:  CBC:   Recent Labs   Lab 02/15/25  0611   WBC 9.24   RBC 3.54*   HGB 10.5*   HCT 32.2*      MCV 91   MCH 29.7   MCHC 32.6     CMP:   Recent Labs   Lab 02/15/25  0611   *   CALCIUM 8.8   ALBUMIN 3.3*   PROT 7.4      K 4.3   CO2 15*      BUN 83*   CREATININE 3.5*   ALKPHOS 217*   ALT 42   AST 44*   BILITOT 0.2     All labs within the past 24 hours have been reviewed.    Significant Imaging:  Labs: Reviewed  ECG: Reviewed  X-Ray: Reviewed  Echo: Reviewed    Assessment/Plan:     KYA on CKD 3b  - baseline Cr 2.0, last at baseline 1/14/25  - Cr on arrival 3.5, etiology likely due to bradycardia, appreciate cardiology recs  - Keep MAP > 65  - ordered urine studies  - agree with IVF, low threshold to stop IVF  -Keep hemoglobin > 7  -Strict ins and outs  -Avoid nephrotoxic agents if possible and renally dose medications  -Avoid drastic hemodynamic changes if possible   - daily labs    Anemia of CKD   - hgb 10.5, at goal  - will trend    Metabolic Acidosis  -CO2 15 , likely due to KYA  - will trend        Thank you for your consult. I will follow-up  with patient. Please contact us if you have any additional questions.    ARMIDA Vickers  Nephrology  [facility] - Intensive Care

## 2025-02-16 NOTE — SUBJECTIVE & OBJECTIVE
Interval History:  Patient is still bradycardic on telemetry overnight with minimum heart rate of 36; still bradycardic.  Worsening KYA with creatinine of 3.8.    Review of Systems   Constitutional:  Negative for activity change, diaphoresis and fatigue.   Respiratory:  Negative for cough, choking and shortness of breath.    Cardiovascular:  Negative for chest pain, palpitations and leg swelling.     Objective:     Vital Signs (Most Recent):  Temp: 98.2 °F (36.8 °C) (02/16/25 0800)  Pulse: (!) 40 (02/16/25 1000)  Resp: 20 (02/16/25 1000)  BP: 136/60 (02/16/25 1000)  SpO2: 99 % (02/16/25 1000) Vital Signs (24h Range):  Temp:  [97.8 °F (36.6 °C)-98.4 °F (36.9 °C)] 98.2 °F (36.8 °C)  Pulse:  [38-44] 40  Resp:  [15-36] 20  SpO2:  [97 %-100 %] 99 %  BP: (115-177)/(54-84) 136/60     Weight: 85.9 kg (189 lb 6 oz)  Body mass index is 27.97 kg/m².    Intake/Output Summary (Last 24 hours) at 2/16/2025 1137  Last data filed at 2/16/2025 1100  Gross per 24 hour   Intake 1351.73 ml   Output 320 ml   Net 1031.73 ml         Physical Exam  Constitutional:       General: She is not in acute distress.     Appearance: Normal appearance. She is ill-appearing. She is not toxic-appearing or diaphoretic.   Cardiovascular:      Rate and Rhythm: Regular rhythm. Bradycardia present.      Pulses: Normal pulses.      Heart sounds: Normal heart sounds. No murmur heard.     No gallop.   Pulmonary:      Effort: Pulmonary effort is normal. No respiratory distress.      Breath sounds: Normal breath sounds. No stridor.   Abdominal:      General: Abdomen is flat. There is no distension.      Palpations: Abdomen is soft.      Tenderness: There is no abdominal tenderness.   Neurological:      General: No focal deficit present.      Mental Status: She is alert and oriented to person, place, and time.             Significant Labs: CBC:   Recent Labs   Lab 02/15/25  0611   WBC 9.24   HGB 10.5*   HCT 32.2*        CMP:   Recent Labs   Lab  02/15/25  0611 02/16/25  1114    133*   K 4.3 5.1    109   CO2 15* 11*   * 223*   BUN 83* 91*   CREATININE 3.5* 3.8*   CALCIUM 8.8 8.4*   PROT 7.4  --    ALBUMIN 3.3* 2.9*   BILITOT 0.2  --    ALKPHOS 217*  --    AST 44*  --    ALT 42  --    ANIONGAP 14 13       Significant Imaging: I have reviewed all pertinent imaging results/findings within the past 24 hours.

## 2025-02-17 PROBLEM — Z95.0 CARDIAC PACEMAKER IN SITU: Status: ACTIVE | Noted: 2025-02-17

## 2025-02-17 LAB
ALBUMIN SERPL BCP-MCNC: 3.1 G/DL (ref 3.5–5.2)
ANION GAP SERPL CALC-SCNC: 14 MMOL/L (ref 8–16)
BACTERIA UR CULT: ABNORMAL
BASOPHILS # BLD AUTO: 0.06 K/UL (ref 0–0.2)
BASOPHILS NFR BLD: 0.5 % (ref 0–1.9)
BUN SERPL-MCNC: 100 MG/DL (ref 8–23)
CALCIUM SERPL-MCNC: 8.7 MG/DL (ref 8.7–10.5)
CHLORIDE SERPL-SCNC: 106 MMOL/L (ref 95–110)
CO2 SERPL-SCNC: 13 MMOL/L (ref 23–29)
CREAT SERPL-MCNC: 4.1 MG/DL (ref 0.5–1.4)
DIFFERENTIAL METHOD BLD: ABNORMAL
EOSINOPHIL # BLD AUTO: 0 K/UL (ref 0–0.5)
EOSINOPHIL NFR BLD: 0.1 % (ref 0–8)
ERYTHROCYTE [DISTWIDTH] IN BLOOD BY AUTOMATED COUNT: 15.4 % (ref 11.5–14.5)
EST. GFR  (NO RACE VARIABLE): 11 ML/MIN/1.73 M^2
GLUCOSE SERPL-MCNC: 212 MG/DL (ref 70–110)
HCT VFR BLD AUTO: 31.7 % (ref 37–48.5)
HGB BLD-MCNC: 10.1 G/DL (ref 12–16)
IMM GRANULOCYTES # BLD AUTO: 0.13 K/UL (ref 0–0.04)
IMM GRANULOCYTES NFR BLD AUTO: 1 % (ref 0–0.5)
LYMPHOCYTES # BLD AUTO: 1 K/UL (ref 1–4.8)
LYMPHOCYTES NFR BLD: 7.8 % (ref 18–48)
MCH RBC QN AUTO: 29.5 PG (ref 27–31)
MCHC RBC AUTO-ENTMCNC: 31.9 G/DL (ref 32–36)
MCV RBC AUTO: 93 FL (ref 82–98)
MONOCYTES # BLD AUTO: 0.8 K/UL (ref 0.3–1)
MONOCYTES NFR BLD: 6.4 % (ref 4–15)
NEUTROPHILS # BLD AUTO: 10.6 K/UL (ref 1.8–7.7)
NEUTROPHILS NFR BLD: 84.2 % (ref 38–73)
NRBC BLD-RTO: 0 /100 WBC
PHOSPHATE SERPL-MCNC: 7 MG/DL (ref 2.7–4.5)
PLATELET # BLD AUTO: 262 K/UL (ref 150–450)
PMV BLD AUTO: 12.7 FL (ref 9.2–12.9)
POCT GLUCOSE: 214 MG/DL (ref 70–110)
POCT GLUCOSE: 252 MG/DL (ref 70–110)
POCT GLUCOSE: 279 MG/DL (ref 70–110)
POCT GLUCOSE: 309 MG/DL (ref 70–110)
POTASSIUM SERPL-SCNC: 5 MMOL/L (ref 3.5–5.1)
RBC # BLD AUTO: 3.42 M/UL (ref 4–5.4)
SODIUM SERPL-SCNC: 133 MMOL/L (ref 136–145)
WBC # BLD AUTO: 12.61 K/UL (ref 3.9–12.7)

## 2025-02-17 PROCEDURE — C1894 INTRO/SHEATH, NON-LASER: HCPCS | Mod: HCNC | Performed by: INTERNAL MEDICINE

## 2025-02-17 PROCEDURE — 99152 MOD SED SAME PHYS/QHP 5/>YRS: CPT | Mod: HCNC,,, | Performed by: INTERNAL MEDICINE

## 2025-02-17 PROCEDURE — 21400001 HC TELEMETRY ROOM: Mod: HCNC

## 2025-02-17 PROCEDURE — 02HK3JZ INSERTION OF PACEMAKER LEAD INTO RIGHT VENTRICLE, PERCUTANEOUS APPROACH: ICD-10-PCS | Performed by: INTERNAL MEDICINE

## 2025-02-17 PROCEDURE — 94761 N-INVAS EAR/PLS OXIMETRY MLT: CPT | Mod: HCNC

## 2025-02-17 PROCEDURE — 99152 MOD SED SAME PHYS/QHP 5/>YRS: CPT | Mod: HCNC | Performed by: INTERNAL MEDICINE

## 2025-02-17 PROCEDURE — 25000003 PHARM REV CODE 250: Mod: HCNC | Performed by: INTERNAL MEDICINE

## 2025-02-17 PROCEDURE — 63600175 PHARM REV CODE 636 W HCPCS: Mod: HCNC | Performed by: INTERNAL MEDICINE

## 2025-02-17 PROCEDURE — 25000003 PHARM REV CODE 250: Mod: HCNC | Performed by: EMERGENCY MEDICINE

## 2025-02-17 PROCEDURE — 0JH606Z INSERTION OF PACEMAKER, DUAL CHAMBER INTO CHEST SUBCUTANEOUS TISSUE AND FASCIA, OPEN APPROACH: ICD-10-PCS | Performed by: INTERNAL MEDICINE

## 2025-02-17 PROCEDURE — 33208 INSRT HEART PM ATRIAL & VENT: CPT | Mod: KX,HCNC,, | Performed by: INTERNAL MEDICINE

## 2025-02-17 PROCEDURE — 02H63JZ INSERTION OF PACEMAKER LEAD INTO RIGHT ATRIUM, PERCUTANEOUS APPROACH: ICD-10-PCS | Performed by: INTERNAL MEDICINE

## 2025-02-17 PROCEDURE — 33208 INSRT HEART PM ATRIAL & VENT: CPT | Mod: KX,HCNC | Performed by: INTERNAL MEDICINE

## 2025-02-17 PROCEDURE — 93010 ELECTROCARDIOGRAM REPORT: CPT | Mod: XE,HCNC,, | Performed by: INTERNAL MEDICINE

## 2025-02-17 PROCEDURE — 85025 COMPLETE CBC W/AUTO DIFF WBC: CPT | Mod: HCNC | Performed by: INTERNAL MEDICINE

## 2025-02-17 PROCEDURE — 36415 COLL VENOUS BLD VENIPUNCTURE: CPT | Mod: HCNC | Performed by: INTERNAL MEDICINE

## 2025-02-17 PROCEDURE — 25000003 PHARM REV CODE 250: Mod: HCNC

## 2025-02-17 PROCEDURE — 99291 CRITICAL CARE FIRST HOUR: CPT | Mod: HCNC,,, | Performed by: STUDENT IN AN ORGANIZED HEALTH CARE EDUCATION/TRAINING PROGRAM

## 2025-02-17 PROCEDURE — 63600175 PHARM REV CODE 636 W HCPCS: Mod: HCNC

## 2025-02-17 PROCEDURE — 63600175 PHARM REV CODE 636 W HCPCS: Mod: HCNC | Performed by: HOSPITALIST

## 2025-02-17 PROCEDURE — C1785 PMKR, DUAL, RATE-RESP: HCPCS | Mod: HCNC | Performed by: INTERNAL MEDICINE

## 2025-02-17 PROCEDURE — 99153 MOD SED SAME PHYS/QHP EA: CPT | Mod: HCNC | Performed by: INTERNAL MEDICINE

## 2025-02-17 PROCEDURE — 93005 ELECTROCARDIOGRAM TRACING: CPT | Mod: HCNC

## 2025-02-17 PROCEDURE — 99900035 HC TECH TIME PER 15 MIN (STAT): Mod: HCNC

## 2025-02-17 PROCEDURE — 63600175 PHARM REV CODE 636 W HCPCS: Mod: HCNC | Performed by: EMERGENCY MEDICINE

## 2025-02-17 PROCEDURE — 80069 RENAL FUNCTION PANEL: CPT | Mod: HCNC | Performed by: INTERNAL MEDICINE

## 2025-02-17 PROCEDURE — C1898 LEAD, PMKR, OTHER THAN TRANS: HCPCS | Mod: HCNC | Performed by: INTERNAL MEDICINE

## 2025-02-17 PROCEDURE — 25000003 PHARM REV CODE 250: Mod: HCNC | Performed by: HOSPITALIST

## 2025-02-17 DEVICE — LEAD 407652 CAPSUREFIX NOVUS US MRI
Type: IMPLANTABLE DEVICE | Site: CHEST  WALL | Status: FUNCTIONAL
Brand: CAPSUREFIX NOVUS MRI™ SURESCAN™

## 2025-02-17 DEVICE — LEAD 5076-58 MRI US RCMCRD
Type: IMPLANTABLE DEVICE | Site: CHEST  WALL | Status: FUNCTIONAL
Brand: CAPSUREFIX NOVUS MRI™ SURESCAN®

## 2025-02-17 DEVICE — IPG W3DR01 AZURE S DR MRI USA
Type: IMPLANTABLE DEVICE | Site: CHEST  WALL | Status: FUNCTIONAL
Brand: AZURE™ S DR MRI SURESCAN™

## 2025-02-17 RX ORDER — FENTANYL CITRATE 50 UG/ML
INJECTION, SOLUTION INTRAMUSCULAR; INTRAVENOUS
Status: DISCONTINUED | OUTPATIENT
Start: 2025-02-17 | End: 2025-02-17 | Stop reason: HOSPADM

## 2025-02-17 RX ORDER — OXYCODONE HYDROCHLORIDE 5 MG/1
10 TABLET ORAL EVERY 4 HOURS PRN
Refills: 0 | Status: DISCONTINUED | OUTPATIENT
Start: 2025-02-17 | End: 2025-02-25 | Stop reason: HOSPADM

## 2025-02-17 RX ORDER — ACETAMINOPHEN 325 MG/1
650 TABLET ORAL EVERY 4 HOURS PRN
Status: DISCONTINUED | OUTPATIENT
Start: 2025-02-17 | End: 2025-02-17 | Stop reason: SDUPTHER

## 2025-02-17 RX ORDER — ALUMINUM HYDROXIDE, MAGNESIUM HYDROXIDE, AND SIMETHICONE 1200; 120; 1200 MG/30ML; MG/30ML; MG/30ML
30 SUSPENSION ORAL ONCE
Status: COMPLETED | OUTPATIENT
Start: 2025-02-17 | End: 2025-02-17

## 2025-02-17 RX ORDER — LORAZEPAM 2 MG/ML
1 INJECTION INTRAMUSCULAR ONCE
Status: COMPLETED | OUTPATIENT
Start: 2025-02-17 | End: 2025-02-17

## 2025-02-17 RX ORDER — CEPHALEXIN 500 MG/1
500 CAPSULE ORAL EVERY 12 HOURS
Status: DISCONTINUED | OUTPATIENT
Start: 2025-02-17 | End: 2025-02-17

## 2025-02-17 RX ORDER — MIDAZOLAM HYDROCHLORIDE 1 MG/ML
INJECTION INTRAMUSCULAR; INTRAVENOUS
Status: DISCONTINUED | OUTPATIENT
Start: 2025-02-17 | End: 2025-02-17 | Stop reason: HOSPADM

## 2025-02-17 RX ORDER — LORAZEPAM 0.5 MG/1
1 TABLET ORAL DAILY PRN
Status: DISCONTINUED | OUTPATIENT
Start: 2025-02-17 | End: 2025-02-25 | Stop reason: HOSPADM

## 2025-02-17 RX ORDER — CEFAZOLIN 2 G/1
2 INJECTION, POWDER, FOR SOLUTION INTRAMUSCULAR; INTRAVENOUS
Status: COMPLETED | OUTPATIENT
Start: 2025-02-17 | End: 2025-02-17

## 2025-02-17 RX ORDER — LIDOCAINE HYDROCHLORIDE AND EPINEPHRINE 10; 20 UG/ML; MG/ML
INJECTION, SOLUTION INFILTRATION; PERINEURAL
Status: DISCONTINUED | OUTPATIENT
Start: 2025-02-17 | End: 2025-02-17 | Stop reason: HOSPADM

## 2025-02-17 RX ORDER — CEFAZOLIN SODIUM 1 G/3ML
INJECTION, POWDER, FOR SOLUTION INTRAMUSCULAR; INTRAVENOUS
Status: DISCONTINUED | OUTPATIENT
Start: 2025-02-17 | End: 2025-02-17 | Stop reason: HOSPADM

## 2025-02-17 RX ORDER — CEPHALEXIN 500 MG/1
500 CAPSULE ORAL EVERY 8 HOURS
Status: DISCONTINUED | OUTPATIENT
Start: 2025-02-17 | End: 2025-02-18

## 2025-02-17 RX ORDER — HYDROMORPHONE HYDROCHLORIDE 2 MG/ML
1 INJECTION, SOLUTION INTRAMUSCULAR; INTRAVENOUS; SUBCUTANEOUS EVERY 6 HOURS PRN
Status: DISCONTINUED | OUTPATIENT
Start: 2025-02-17 | End: 2025-02-25 | Stop reason: HOSPADM

## 2025-02-17 RX ORDER — HYDROCODONE BITARTRATE AND ACETAMINOPHEN 5; 325 MG/1; MG/1
1 TABLET ORAL EVERY 4 HOURS PRN
Refills: 0 | Status: DISCONTINUED | OUTPATIENT
Start: 2025-02-17 | End: 2025-02-17

## 2025-02-17 RX ORDER — LIDOCAINE HYDROCHLORIDE 20 MG/ML
15 SOLUTION OROPHARYNGEAL ONCE
Status: COMPLETED | OUTPATIENT
Start: 2025-02-17 | End: 2025-02-17

## 2025-02-17 RX ORDER — INSULIN GLARGINE 100 [IU]/ML
10 INJECTION, SOLUTION SUBCUTANEOUS NIGHTLY
Status: DISCONTINUED | OUTPATIENT
Start: 2025-02-17 | End: 2025-02-18

## 2025-02-17 RX ORDER — HYDROCODONE BITARTRATE AND ACETAMINOPHEN 10; 325 MG/1; MG/1
1 TABLET ORAL EVERY 4 HOURS PRN
Refills: 0 | Status: DISCONTINUED | OUTPATIENT
Start: 2025-02-17 | End: 2025-02-17

## 2025-02-17 RX ADMIN — SIMETHICONE 80 MG: 80 TABLET, CHEWABLE ORAL at 12:02

## 2025-02-17 RX ADMIN — INSULIN ASPART 5 UNITS: 100 INJECTION, SOLUTION INTRAVENOUS; SUBCUTANEOUS at 07:02

## 2025-02-17 RX ADMIN — INSULIN ASPART 6 UNITS: 100 INJECTION, SOLUTION INTRAVENOUS; SUBCUTANEOUS at 07:02

## 2025-02-17 RX ADMIN — FLUOXETINE HYDROCHLORIDE 40 MG: 20 CAPSULE ORAL at 10:02

## 2025-02-17 RX ADMIN — ISOSORBIDE MONONITRATE 60 MG: 30 TABLET, EXTENDED RELEASE ORAL at 10:02

## 2025-02-17 RX ADMIN — CEPHALEXIN 500 MG: 500 CAPSULE ORAL at 12:02

## 2025-02-17 RX ADMIN — Medication 6 MG: at 08:02

## 2025-02-17 RX ADMIN — INSULIN ASPART 5 UNITS: 100 INJECTION, SOLUTION INTRAVENOUS; SUBCUTANEOUS at 12:02

## 2025-02-17 RX ADMIN — PANTOPRAZOLE SODIUM 40 MG: 40 TABLET, DELAYED RELEASE ORAL at 10:02

## 2025-02-17 RX ADMIN — HYDROCODONE BITARTRATE AND ACETAMINOPHEN 1 TABLET: 10; 325 TABLET ORAL at 08:02

## 2025-02-17 RX ADMIN — CEPHALEXIN 500 MG: 500 CAPSULE ORAL at 09:02

## 2025-02-17 RX ADMIN — HYDRALAZINE HYDROCHLORIDE 100 MG: 25 TABLET ORAL at 06:02

## 2025-02-17 RX ADMIN — INSULIN ASPART 4 UNITS: 100 INJECTION, SOLUTION INTRAVENOUS; SUBCUTANEOUS at 12:02

## 2025-02-17 RX ADMIN — LORAZEPAM 1 MG: 2 INJECTION INTRAMUSCULAR; INTRAVENOUS at 10:02

## 2025-02-17 RX ADMIN — FUROSEMIDE 40 MG: 40 TABLET ORAL at 10:02

## 2025-02-17 RX ADMIN — INSULIN GLARGINE 10 UNITS: 100 INJECTION, SOLUTION SUBCUTANEOUS at 08:02

## 2025-02-17 RX ADMIN — MUPIROCIN: 20 OINTMENT TOPICAL at 10:02

## 2025-02-17 RX ADMIN — CEFAZOLIN 2 G: 2 INJECTION, POWDER, FOR SOLUTION INTRAMUSCULAR; INTRAVENOUS at 09:02

## 2025-02-17 RX ADMIN — HYDROMORPHONE HYDROCHLORIDE 1 MG: 2 INJECTION, SOLUTION INTRAMUSCULAR; INTRAVENOUS; SUBCUTANEOUS at 09:02

## 2025-02-17 RX ADMIN — ALUMINUM HYDROXIDE, MAGNESIUM HYDROXIDE, AND SIMETHICONE 30 ML: 1200; 120; 1200 SUSPENSION ORAL at 01:02

## 2025-02-17 RX ADMIN — HYDRALAZINE HYDROCHLORIDE 100 MG: 25 TABLET ORAL at 02:02

## 2025-02-17 RX ADMIN — ONDANSETRON 4 MG: 2 INJECTION INTRAMUSCULAR; INTRAVENOUS at 10:02

## 2025-02-17 RX ADMIN — ACETAMINOPHEN 650 MG: 325 TABLET ORAL at 10:02

## 2025-02-17 RX ADMIN — MUPIROCIN: 20 OINTMENT TOPICAL at 08:02

## 2025-02-17 RX ADMIN — INSULIN ASPART 3 UNITS: 100 INJECTION, SOLUTION INTRAVENOUS; SUBCUTANEOUS at 08:02

## 2025-02-17 RX ADMIN — LIDOCAINE HYDROCHLORIDE 15 ML: 20 SOLUTION ORAL at 01:02

## 2025-02-17 RX ADMIN — INSULIN ASPART 8 UNITS: 100 INJECTION, SOLUTION INTRAVENOUS; SUBCUTANEOUS at 05:02

## 2025-02-17 RX ADMIN — FUROSEMIDE 40 MG: 40 TABLET ORAL at 08:02

## 2025-02-17 RX ADMIN — HYDRALAZINE HYDROCHLORIDE 100 MG: 25 TABLET ORAL at 09:02

## 2025-02-17 RX ADMIN — INSULIN ASPART 5 UNITS: 100 INJECTION, SOLUTION INTRAVENOUS; SUBCUTANEOUS at 05:02

## 2025-02-17 RX ADMIN — ATORVASTATIN CALCIUM 80 MG: 40 TABLET, FILM COATED ORAL at 08:02

## 2025-02-17 NOTE — NURSING
[facility]  Nurses Note -- 4 Eyes      2/16/2025       Skin assessed on: Q Shift      [x] No Pressure Injuries Present    [x]Prevention Measures Documented    [] Yes LDA  for Pressure Injury Previously documented     [] Yes New Pressure Injury Discovered   [] LDA for New Pressure Injury Added      Attending RN:  Michelle Tang RN     Second RN:  FRANCIS Acosta

## 2025-02-17 NOTE — SUBJECTIVE & OBJECTIVE
Interval History: status post pacemaker placement this AM. Labs resulted and KYA worsening.    Review of patient's allergies indicates:   Allergen Reactions    Novolin 70/30 (semi-synthetic) Nausea And Vomiting     Severe vomiting on Relion 70/30    Sulfa (sulfonamide antibiotics) Anaphylaxis    Talwin [pentazocine lactate] Anaphylaxis    Victoza [liraglutide] Nausea And Vomiting    Glipizide Nausea Only    Codeine     Influenza virus vaccines Hives    Iodine and iodide containing products Hives    Levetiracetam Itching    Lyrica [pregabalin] Hallucinations    Neurontin [gabapentin]      Possible associated myoclonic jerk    Rituxan [rituximab] Hives    Zoloft [sertraline] Nausea And Vomiting     Current Facility-Administered Medications   Medication Frequency    acetaminophen tablet 650 mg Q4H PRN    albuterol-ipratropium 2.5 mg-0.5 mg/3 mL nebulizer solution 3 mL Q4H PRN    aluminum-magnesium hydroxide-simethicone 200-200-20 mg/5 mL suspension 30 mL Once    And    LIDOcaine viscous HCl 2% oral solution 15 mL Once    atorvastatin tablet 80 mg QHS    cephALEXin capsule 500 mg Q8H    dextrose 50% injection 12.5 g PRN    dextrose 50% injection 25 g PRN    FLUoxetine capsule 40 mg Daily    furosemide tablet 40 mg BID    glucagon (human recombinant) injection 1 mg PRN    glucose chewable tablet 16 g PRN    glucose chewable tablet 24 g PRN    hydrALAZINE injection 10 mg Q4H PRN    hydrALAZINE tablet 100 mg Q8H    HYDROcodone-acetaminophen  mg per tablet 1 tablet Q4H PRN    HYDROcodone-acetaminophen 5-325 mg per tablet 1 tablet Q4H PRN    insulin aspart U-100 pen 0-10 Units QID (AC + HS) PRN    insulin aspart U-100 pen 5 Units TIDWM    insulin glargine U-100 (Lantus) pen 10 Units QHS    isosorbide mononitrate 24 hr tablet 60 mg Daily    melatonin tablet 6 mg Nightly PRN    mupirocin 2 % ointment BID    ondansetron injection 4 mg Q8H PRN    pantoprazole EC tablet 40 mg Daily    pneumoc 20-michael conj-dip cr(PF)  (PREVNAR-20 (PF)) injection Syrg 0.5 mL vaccine x 1 dose    simethicone chewable tablet 80 mg TID PRN    sodium chloride 0.9% flush 10 mL PRN    sodium chloride 0.9% flush 10 mL PRN       Objective:     Vital Signs (Most Recent):  Temp: 97.4 °F (36.3 °C) (02/17/25 1020)  Pulse: (!) 38 (02/17/25 0800)  Resp: (!) 23 (02/17/25 0800)  BP: 124/60 (02/17/25 1020)  SpO2: 100 % (02/17/25 0800) Vital Signs (24h Range):  Temp:  [97.2 °F (36.2 °C)-98.7 °F (37.1 °C)] 97.4 °F (36.3 °C)  Pulse:  [36-41] 38  Resp:  [17-52] 23  SpO2:  [96 %-100 %] 100 %  BP: (121-156)/(53-80) 124/60     Weight: 85.9 kg (189 lb 6 oz) (02/15/25 0900)  Body mass index is 27.97 kg/m².  Body surface area is 2.04 meters squared.    I/O last 3 completed shifts:  In: 2043.1 [P.O.:200; I.V.:1843.1]  Out: 850 [Urine:850]     Physical Exam  Constitutional:       General: She is not in acute distress.     Appearance: She is not ill-appearing or toxic-appearing.   HENT:      Nose: Nose normal. No congestion.      Mouth/Throat:      Mouth: Mucous membranes are moist.   Eyes:      Pupils: Pupils are equal, round, and reactive to light.   Cardiovascular:      Rate and Rhythm: Tachycardia present.      Heart sounds: Murmur heard.      Comments: Paced   Skin:     Findings: Bruising present.   Neurological:      Mental Status: She is alert.          Significant Labs:  All labs within the past 24 hours have been reviewed.     Significant Imaging:  Labs: Reviewed

## 2025-02-17 NOTE — ASSESSMENT & PLAN NOTE
Hx of chest pain on presentation; different from previous anginal pains  EKG showed complete heart block  Troponin not elevated  Discontinue all AV frances blocking agents  Monitor patient on telemetry  S/p [employer] PPM on 02/17  Continue Keflex; arm sling per Cardiology  Restart aspirin and Brilinta in the a.m. if no bleeding complication

## 2025-02-17 NOTE — PLAN OF CARE
1100:  To patient's room to discuss DC plan.  Patient off unit.    1224:  Message sent to Palliative care NP to ask Palliative SW see patient about Advance Directive.

## 2025-02-17 NOTE — PLAN OF CARE
Problem: Adult Inpatient Plan of Care  Goal: Patient-Specific Goal (Individualized)  Outcome: Progressing     Patient is awake alert oriented. Able to move independently on bed with minimal assistance. Remain on 3rd degree block heart rhythm. Blood pressure within normal limit and denies any discomfort, dizziness or chest pain. On heart monitor continuous. Pre-op checklist completed on chart. Chlorhexedine bath done this morning. And NPO post midnight was initiated and been on NPO except meds this morning.

## 2025-02-17 NOTE — PLAN OF CARE
Problem: Adult Inpatient Plan of Care  Goal: Plan of Care Review  Outcome: Progressing  Goal: Patient-Specific Goal (Individualized)  Outcome: Progressing  Goal: Absence of Hospital-Acquired Illness or Injury  Outcome: Progressing  Goal: Optimal Comfort and Wellbeing  Outcome: Progressing  Goal: Readiness for Transition of Care  Outcome: Progressing     Problem: Diabetes Comorbidity  Goal: Blood Glucose Level Within Targeted Range  Outcome: Progressing     Problem: Acute Kidney Injury/Impairment  Goal: Fluid and Electrolyte Balance  Outcome: Progressing  Goal: Improved Oral Intake  Outcome: Progressing  Goal: Effective Renal Function  Outcome: Progressing     Problem: Wound  Goal: Optimal Pain Control and Function  Outcome: Progressing  Goal: Skin Health and Integrity  Outcome: Progressing     Problem: Fall Injury Risk  Goal: Absence of Fall and Fall-Related Injury  Outcome: Progressing     Problem: Skin Injury Risk Increased  Goal: Skin Health and Integrity  Outcome: Progressing

## 2025-02-17 NOTE — ASSESSMENT & PLAN NOTE
Patient with known CAD s/p stent placement and CABG, which is controlled Will continue Statin; hold aspirin and Plavix setting of impending pacing and monitor for S/Sx of angina/ACS. Continue to monitor on telemetry.   -repeat ischemic evaluation of after PPM; defer timing to cardiology

## 2025-02-17 NOTE — CARE UPDATE
[facility] [place]  Nurses Note -- 4 Eyes      2/16/2025       Skin assessed on: Q Shift      [x] No Pressure Injuries Present    [x]Prevention Measures Documented    [] Yes LDA  for Pressure Injury Previously documented     [] Yes New Pressure Injury Discovered   [] LDA for New Pressure Injury Added      Attending RN:  Rigo Balderrama Jr., RN

## 2025-02-17 NOTE — PROGRESS NOTES
[facility] Medicine  Progress Note    Patient Name: Lorena Contreras  MRN: [id number]  Patient Class: IP- Inpatient   Admission Date: 2/15/2025  Length of Stay: 2 days  Attending Physician: Germaine Murray MD  Primary Care Provider: Jorge Paris MD        Subjective     Principal Problem:Complete heart block        HPI:  A pleasant  74 yo F with PMHx of  DM2, Fibromyalgia, lymphoma s/p chemo, HTN, HLD, CVA, MI, CAD s/p PCIs  CKD3b, HFpEF, and anemia who presented to the ED with a chief complaint of chest pain radiating to the back with onset a day before presentation.    Pain was said to be sudden in onset; no associated nausea or diaphoresis.  No Preceding PND orthopnea or leg swelling; pain was described as different from her previous coronary events.  Patient however endorsed dizziness but this has been going on for some time.  She denied any syncopal event  Symptoms progress and this prompted patient to come to the ED    Retrospective chart review indicates multiple ED visits for observation for atypical chest pain.  Cardiac history is significant for CAD with JESIKA x 2 to RCA 3/29/19 - JESIKA to RI and Cx 1/8/20      On presentation to the ED; patient was bradycardic to 32 BPM which subsequently improved and mildly hypertensive.  EKG showed complete heart block.  Patient was admitted to the ICU for further management.    Overview/Hospital Course:  74 yo F with PMHx of  DM2, Fibromyalgia, lymphoma s/p chemo, HTN, HLD, CVA, MI, CAD s/p PCIs  CKD3b, HFpEF, and anemia who presented to the ED with a chief complaint of chest pain radiating to the back with onset a day before presentation admitted for complete heart block and KYA.  Patient is scheduled for ppm on 02/17 per Cardiology; intraprocedural or immediate postprocedural complications.  Patient to start aspirin and Brilinta on 02/18 if no access site complications.    Interval History:  Patient had ppm placement today.  Noted  worsening creatinine; nephrology following; no acute indication for HD; diuresis or IVF.  Expressed anxiety with regards to her renal function.  Discussed with the Cardiology stable for transfer to telemetry.    Review of Systems   Constitutional:  Negative for diaphoresis and fatigue.   Respiratory:  Negative for cough, shortness of breath and wheezing.    Cardiovascular:  Positive for chest pain.   Gastrointestinal:  Negative for abdominal distention and nausea.     Objective:     Vital Signs (Most Recent):  Temp: 97.4 °F (36.3 °C) (02/17/25 1020)  Pulse: (!) 38 (02/17/25 0800)  Resp: (!) 23 (02/17/25 0800)  BP: 124/60 (02/17/25 1020)  SpO2: 100 % (02/17/25 0800) Vital Signs (24h Range):  Temp:  [97.2 °F (36.2 °C)-98.7 °F (37.1 °C)] 97.4 °F (36.3 °C)  Pulse:  [36-41] 38  Resp:  [17-52] 23  SpO2:  [96 %-100 %] 100 %  BP: (121-156)/(53-80) 124/60     Weight: 85.9 kg (189 lb 6 oz)  Body mass index is 27.97 kg/m².    Intake/Output Summary (Last 24 hours) at 2/17/2025 1328  Last data filed at 2/17/2025 0633  Gross per 24 hour   Intake 1042.7 ml   Output 650 ml   Net 392.7 ml         Physical Exam  Constitutional:       General: She is not in acute distress.     Appearance: She is not ill-appearing, toxic-appearing or diaphoretic.   Cardiovascular:      Rate and Rhythm: Normal rate and regular rhythm.      Pulses: Normal pulses.      Heart sounds: Normal heart sounds. No murmur heard.     No friction rub.   Pulmonary:      Effort: Pulmonary effort is normal. No respiratory distress.      Breath sounds: Normal breath sounds. No stridor.      Comments: Clean surgical dressing on left anterior chest wall  Abdominal:      General: There is no distension.      Palpations: Abdomen is soft. There is no mass.      Tenderness: There is no abdominal tenderness.      Hernia: No hernia is present.   Neurological:      General: No focal deficit present.      Mental Status: She is oriented to person, place, and time.              Significant Labs: CBC:   Recent Labs   Lab 02/17/25  1200   WBC 12.61   HGB 10.1*   HCT 31.7*        CMP:   Recent Labs   Lab 02/16/25  1114 02/17/25  1200   * 133*   K 5.1 5.0    106   CO2 11* 13*   * 212*   BUN 91* 100*   CREATININE 3.8* 4.1*   CALCIUM 8.4* 8.7   ALBUMIN 2.9* 3.1*   ANIONGAP 13 14       Significant Imaging:   X-Ray Chest AP Portable   Final Result      As above         Electronically signed by: Eddie Montilla MD   Date:    02/17/2025   Time:    12:37      X-Ray Chest 1 View   Final Result      No acute cardiopulmonary process.         Electronically signed by: Kishore Mcgill DO   Date:    02/15/2025   Time:    08:07            Assessment and Plan     * Complete heart block  Hx of chest pain on presentation; different from previous anginal pains  EKG showed complete heart block  Troponin not elevated  Discontinue all AV frances blocking agents  Monitor patient on telemetry  S/p [employer] PPM on 02/17  Continue Keflex; arm sling per Cardiology  Restart aspirin and Brilinta in the a.m. if no bleeding complication      Acute kidney injury superimposed on stage 3b chronic kidney disease  KYA is likely due to pre-renal azotemia due to intravascular volume depletion. Baseline creatinine is  1.6 to 2.2 . Most recent creatinine and eGFR are listed below.  Recent Labs     02/15/25  0611 02/16/25  1114   CREATININE 3.5* 3.8*   EGFRNORACEVR 13* 12*      Plan  - KYA is worsening. Will adjust treatment as follows:  IVF  - Avoid nephrotoxins and renally dose meds for GFR listed above  - Monitor urine output, serial BMP, and adjust therapy as needed  - nephrology consulted; input appreciated    Diabetes mellitus  Patient's FSGs are controlled on current medication regimen.  Last A1c reviewed-   Lab Results   Component Value Date    HGBA1C 5.9 (H) 01/14/2025     Most recent fingerstick glucose reviewed-   Recent Labs   Lab 02/16/25  1653 02/16/25  2105 02/17/25  0746   POCTGLUCOSE 234*  239* 279*       Current correctional scale  Medium  Maintain anti-hyperglycemic dose as follows-   Antihyperglycemics (From admission, onward)      Start     Stop Route Frequency Ordered    02/16/25 1645  insulin aspart U-100 pen 5 Units         -- SubQ 3 times daily with meals 02/16/25 1146    02/15/25 2100  insulin glargine U-100 (Lantus) pen 10 Units         -- SubQ Nightly 02/15/25 1257    02/15/25 1358  insulin aspart U-100 pen 0-10 Units         -- SubQ Before meals & nightly PRN 02/15/25 1258          Hold Oral hypoglycemics while patient is in the hospital.; cardiac consistent carbohydrate diet; monitor blood glucose log and titrate to effect  Prandial aspart added    Stage 3b chronic kidney disease  Creatine stable for now. BMP reviewed- noted Estimated Creatinine Clearance: 15.2 mL/min (A) (based on SCr of 3.8 mg/dL (H)). according to latest data. Based on current GFR, CKD stage is stage 4 - GFR 15-29.  Monitor UOP and serial BMP and adjust therapy as needed. Renally dose meds. Avoid nephrotoxic medications and procedures.    Aortic stenosis  Echocardiogram with evidence of aortic stenosis that is moderate . The patient's most recent echocardiogram result is listed below. We will manage the valvular abnormality by avoiding volume overload; and cardiology follow up as an outpatient    Echo  Result Date: 2/15/2025    Left Ventricle: The left ventricle is normal in size. There is moderate   concentric hypertrophy. There is hyperdynamic systolic function with a   visually estimated ejection fraction of 70 - 75%.    Right Ventricle: Normal right ventricular cavity size. Systolic   function is normal.    Left Atrium: Left atrium is moderately dilated.    Right Atrium: Right atrium is mildly dilated.    Aortic Valve: There is moderate stenosis. Aortic valve area by VTI is   0.8 cm². Aortic valve peak velocity is 3.4 m/s. Mean gradient is 27 mmHg.   The dimensionless index is 0.27.    Mitral Valve: There is mild  stenosis. The mean pressure gradient across   the mitral valve is 5 mmHg at a heart rate of 42  bpm. There is mild   regurgitation.    Tricuspid Valve: There is moderate regurgitation.    Pulmonary Artery: The estimated pulmonary artery systolic pressure is   49 mmHg.    IVC/SVC: Intermediate venous pressure at 8 mmHg.        Echo  Result Date: 10/31/2024    Left Ventricle: The left ventricle is normal in size. Normal wall   thickness. Normal wall motion. There is normal systolic function with a   visually estimated ejection fraction of 60 - 65%. Grade II diastolic   dysfunction. Elevated left ventricular filling pressure.    Right Ventricle: Normal right ventricular cavity size. Wall thickness   is normal. Systolic function is normal.    Left Atrium: Left atrium is severely dilated.    Aortic Valve: There is moderate aortic valve sclerosis. Moderately   restricted motion. There is moderate stenosis. Aortic valve area by VTI is   1.1 cm2. Aortic valve peak velocity is 3.1 m/s. Mean gradient is 20.1   mmHg. The dimensionless index is 0.37.    Mitral Valve: There is mild regurgitation.    Tricuspid Valve: There is mild regurgitation.    Pulmonary Artery: There is pulmonary hypertension. The estimated   pulmonary artery systolic pressure is 46 mmHg.    IVC/SVC: Normal venous pressure at 3 mmHg.           Coronary artery disease of native artery of native heart with stable angina pectoris  Patient with known CAD s/p stent placement and CABG, which is controlled Will continue Statin; hold aspirin and Plavix setting of impending pacing and monitor for S/Sx of angina/ACS. Continue to monitor on telemetry.   -repeat ischemic evaluation of after PPM; defer timing to cardiology    Mixed hyperlipidemia  Continue home statin        VTE Risk Mitigation (From admission, onward)           Ordered     IP VTE HIGH RISK PATIENT  Once         02/15/25 1252     Place sequential compression device  Until discontinued         02/15/25  1252     Place sequential compression device  Until discontinued         02/15/25 0802                    Discharge Planning   MIKHAIL:      Code Status: Full Code   Medical Readiness for Discharge Date:   Discharge Plan A: Home            Critical care time spent on the evaluation and treatment of severe organ dysfunction, review of pertinent labs and imaging studies, discussions with consulting providers and discussions with patient/family: more than 35 minutes.            Germaine Murray MD  Department of Hospital Medicine   [facility] - Intensive Care

## 2025-02-17 NOTE — PROGRESS NOTES
[facility] - Intensive Care  Nephrology  Progress Note    Patient Name: Lorena Contreras  MRN: [id number]  Admission Date: 2/15/2025  Hospital Length of Stay: 2 days  Attending Provider: Germaine Murray MD   Primary Care Physician: Jorge Paris MD  Principal Problem:Complete heart block    Subjective:     HPI: 74 yo F with PMHx of DM2, Fibromyalgia, lymphoma s/p chemo, HTN, HLD, CVA, MI, CAD s/p PCIs CKD3b, HFpEF, and anemia presented to  ED via EMS c/o chest pain radiating to the back x 1 day. Vitals on arrival HR 45bpm. Labs on arrival  Cr 3.5, BNP 1115, EKG complet hear block. CXR normal. Pt given asprin and nitro by ems prior to arrival.     Nephrology consulted for KYA on CKD 3b    Prior records obtained and reviewed. Baseline Cr 2, last at baseline 1/14/25. Cr on arrival 3.5. Pt state she does not have a nephrologist. Pt states she was doing well prior to yesterday. She denies nsaid use, change in uop, n/v.     Interval History: status post pacemaker placement this AM. Labs resulted and KYA worsening.    Review of patient's allergies indicates:   Allergen Reactions    Novolin 70/30 (semi-synthetic) Nausea And Vomiting     Severe vomiting on Relion 70/30    Sulfa (sulfonamide antibiotics) Anaphylaxis    Talwin [pentazocine lactate] Anaphylaxis    Victoza [liraglutide] Nausea And Vomiting    Glipizide Nausea Only    Codeine     Influenza virus vaccines Hives    Iodine and iodide containing products Hives    Levetiracetam Itching    Lyrica [pregabalin] Hallucinations    Neurontin [gabapentin]      Possible associated myoclonic jerk    Rituxan [rituximab] Hives    Zoloft [sertraline] Nausea And Vomiting     Current Facility-Administered Medications   Medication Frequency    acetaminophen tablet 650 mg Q4H PRN    albuterol-ipratropium 2.5 mg-0.5 mg/3 mL nebulizer solution 3 mL Q4H PRN    aluminum-magnesium hydroxide-simethicone 200-200-20 mg/5 mL suspension 30 mL Once    And    LIDOcaine viscous HCl 2% oral  solution 15 mL Once    atorvastatin tablet 80 mg QHS    cephALEXin capsule 500 mg Q8H    dextrose 50% injection 12.5 g PRN    dextrose 50% injection 25 g PRN    FLUoxetine capsule 40 mg Daily    furosemide tablet 40 mg BID    glucagon (human recombinant) injection 1 mg PRN    glucose chewable tablet 16 g PRN    glucose chewable tablet 24 g PRN    hydrALAZINE injection 10 mg Q4H PRN    hydrALAZINE tablet 100 mg Q8H    HYDROcodone-acetaminophen  mg per tablet 1 tablet Q4H PRN    HYDROcodone-acetaminophen 5-325 mg per tablet 1 tablet Q4H PRN    insulin aspart U-100 pen 0-10 Units QID (AC + HS) PRN    insulin aspart U-100 pen 5 Units TIDWM    insulin glargine U-100 (Lantus) pen 10 Units QHS    isosorbide mononitrate 24 hr tablet 60 mg Daily    melatonin tablet 6 mg Nightly PRN    mupirocin 2 % ointment BID    ondansetron injection 4 mg Q8H PRN    pantoprazole EC tablet 40 mg Daily    pneumoc 20-michael conj-dip cr(PF) (PREVNAR-20 (PF)) injection Syrg 0.5 mL vaccine x 1 dose    simethicone chewable tablet 80 mg TID PRN    sodium chloride 0.9% flush 10 mL PRN    sodium chloride 0.9% flush 10 mL PRN       Objective:     Vital Signs (Most Recent):  Temp: 97.4 °F (36.3 °C) (02/17/25 1020)  Pulse: (!) 38 (02/17/25 0800)  Resp: (!) 23 (02/17/25 0800)  BP: 124/60 (02/17/25 1020)  SpO2: 100 % (02/17/25 0800) Vital Signs (24h Range):  Temp:  [97.2 °F (36.2 °C)-98.7 °F (37.1 °C)] 97.4 °F (36.3 °C)  Pulse:  [36-41] 38  Resp:  [17-52] 23  SpO2:  [96 %-100 %] 100 %  BP: (121-156)/(53-80) 124/60     Weight: 85.9 kg (189 lb 6 oz) (02/15/25 0900)  Body mass index is 27.97 kg/m².  Body surface area is 2.04 meters squared.    I/O last 3 completed shifts:  In: 2043.1 [P.O.:200; I.V.:1843.1]  Out: 850 [Urine:850]     Physical Exam  Constitutional:       General: She is not in acute distress.     Appearance: She is not ill-appearing or toxic-appearing.   HENT:      Nose: Nose normal. No congestion.      Mouth/Throat:      Mouth: Mucous  membranes are moist.   Eyes:      Pupils: Pupils are equal, round, and reactive to light.   Cardiovascular:      Rate and Rhythm: Tachycardia present.      Heart sounds: Murmur heard.      Comments: Paced   Skin:     Findings: Bruising present.   Neurological:      Mental Status: She is alert.          Significant Labs:  All labs within the past 24 hours have been reviewed.     Significant Imaging:  Labs: Reviewed  Assessment/Plan:     Renal/  Acute kidney injury superimposed on stage 3b chronic kidney disease  Baseline creatinine 2.0  On presentation creatinine 3.5    KYA due to cardiogenic shock secondary to complete heart block  2/17 pacemaker placed  KYA worsening    Plan/Recommendation  No acute indication for dialysis, IVF or diuretics at this time, hopeful that restoration of cardiac function after pacemaker provides adequate CO for improvement of creatinine.  We discussed the possibility of dialysis and that we are hopeful she will not need it but may for a short time.  Continue to monitor kidney function and UOP  -Keep MAP > 65  -Keep hemoglobin > 7  -Strict ins and outs  -Avoid nephrotoxic agents if possible and renally dose medications  -Avoid drastic hemodynamic changes if possible    Hyponatremia - likely due to KYA, continue to monitor  Metabolic acidosis - due to KYA, continue to monitor. If continues to decrease tomorrow AM please obtain an ABG to confirm acidosis, hesitant to start IV bicarb unless absolutely necessary        Critical care time spent on the evaluation and treatment of severe organ dysfunction, review of pertinent labs and imaging studies, discussions with consulting providers and discussions with patient/family: 30 minutes.      Thank you for your consult. I will follow-up with patient. Please contact us if you have any additional questions.    Alex Castañeda MD  Nephrology  [facility] - Intensive Care

## 2025-02-17 NOTE — SUBJECTIVE & OBJECTIVE
Interval History:  Patient had ppm placement today.  Noted worsening creatinine; nephrology following; no acute indication for HD; diuresis or IVF.  Expressed anxiety with regards to her renal function.  Discussed with the Cardiology stable for transfer to telemetry.    Review of Systems   Constitutional:  Negative for diaphoresis and fatigue.   Respiratory:  Negative for cough, shortness of breath and wheezing.    Cardiovascular:  Positive for chest pain.   Gastrointestinal:  Negative for abdominal distention and nausea.     Objective:     Vital Signs (Most Recent):  Temp: 97.4 °F (36.3 °C) (02/17/25 1020)  Pulse: (!) 38 (02/17/25 0800)  Resp: (!) 23 (02/17/25 0800)  BP: 124/60 (02/17/25 1020)  SpO2: 100 % (02/17/25 0800) Vital Signs (24h Range):  Temp:  [97.2 °F (36.2 °C)-98.7 °F (37.1 °C)] 97.4 °F (36.3 °C)  Pulse:  [36-41] 38  Resp:  [17-52] 23  SpO2:  [96 %-100 %] 100 %  BP: (121-156)/(53-80) 124/60     Weight: 85.9 kg (189 lb 6 oz)  Body mass index is 27.97 kg/m².    Intake/Output Summary (Last 24 hours) at 2/17/2025 1328  Last data filed at 2/17/2025 0633  Gross per 24 hour   Intake 1042.7 ml   Output 650 ml   Net 392.7 ml         Physical Exam  Constitutional:       General: She is not in acute distress.     Appearance: She is not ill-appearing, toxic-appearing or diaphoretic.   Cardiovascular:      Rate and Rhythm: Normal rate and regular rhythm.      Pulses: Normal pulses.      Heart sounds: Normal heart sounds. No murmur heard.     No friction rub.   Pulmonary:      Effort: Pulmonary effort is normal. No respiratory distress.      Breath sounds: Normal breath sounds. No stridor.      Comments: Clean surgical dressing on left anterior chest wall  Abdominal:      General: There is no distension.      Palpations: Abdomen is soft. There is no mass.      Tenderness: There is no abdominal tenderness.      Hernia: No hernia is present.   Neurological:      General: No focal deficit present.      Mental Status:  She is oriented to person, place, and time.             Significant Labs: CBC:   Recent Labs   Lab 02/17/25  1200   WBC 12.61   HGB 10.1*   HCT 31.7*        CMP:   Recent Labs   Lab 02/16/25  1114 02/17/25  1200   * 133*   K 5.1 5.0    106   CO2 11* 13*   * 212*   BUN 91* 100*   CREATININE 3.8* 4.1*   CALCIUM 8.4* 8.7   ALBUMIN 2.9* 3.1*   ANIONGAP 13 14       Significant Imaging:   X-Ray Chest AP Portable   Final Result      As above         Electronically signed by: Eddie Montilla MD   Date:    02/17/2025   Time:    12:37      X-Ray Chest 1 View   Final Result      No acute cardiopulmonary process.         Electronically signed by: Kishore Mcgill DO   Date:    02/15/2025   Time:    08:07

## 2025-02-17 NOTE — NURSING
[facility] [place] 4 [place]      2/17/2025       Skin assessed on: Q Shift      [x] No Pressure Injuries Present    [x]Prevention Measures Documented    [] Yes LDA  for Pressure Injury Previously documented     [] Yes New Pressure Injury Discovered   [] LDA for New Pressure Injury Added      Attending RN:  Karla Smith, FRANCIS GRULLON RN

## 2025-02-17 NOTE — PROGRESS NOTES
[facility]  Adult Nutrition  Progress Note    SUMMARY       Recommendations    Recommendation:  1. When medically aceptable, restart pt on cardiac renal non-dialysis soft & bite sized diet  2. Encourage intake at meals as tolerated  3. Monitor weight/labs  4. RD to follow to monitor PO intake    Goals:  Pt to meet 50-75% EEN/EPN by RD follow-up  Nutrition Goal Status: new  Communication of RD Recs:  (POC)    Assessment and Plan  Nutrition Problem  Altered Nutrition Related Lab Values    Related to (etiology):   KYA/CKD s4    Signs and Symptoms (as evidenced by):   eGFR 11(L)  (H)  Phosphorus 7.0(H)     Interventions:  Collaboration by nutrition professional with other providers    Nutrition Diagnosis Status:   New    Malnutrition Assessment  Fluid Accumulation (Malnutrition): mild   Orbital Region (Subcutaneous Fat Loss): well nourished  Upper Arm Region (Subcutaneous Fat Loss): mild depletion  Thoracic and Lumbar Region: well nourished   [place] Region (Muscle Loss): well nourished  Clavicle Bone Region (Muscle Loss): well nourished  Clavicle and Acromion Bone Region (Muscle Loss): well nourished  Scapular Bone Region (Muscle Loss): well nourished  Dorsal Hand (Muscle Loss): well nourished  Patellar Region (Muscle Loss): well nourished  Anterior Thigh Region (Muscle Loss): well nourished  Posterior Calf Region (Muscle Loss): well nourished     Reason for Assessment  Reason For Assessment: identified at risk by screening criteria (MST)  Diagnosis:  (complete heart block)  General Information Comments: Pt presented to ED with chest pain x 24hrs, admitted with complete heart block. PMH: CKD, diastolic heart failure, CAD, HTN, fibromyalgia, lymphoma. Currently NPO. Noted c/o poor appetite, vomiting and abdominal pain 2/16AM. Spoke with pt, reports decreased appetite since admit with c/o N/V off and on as well as chronic constipation. S/p medtronic dual pacemaker placement today 2/17. Noted  "decreased renal function. Rico 15- skin intact. LBM: 2/16. NFPE assessed at this time, mild depletion noted with mild fluid accumulation. Recent weigth change of 10.3lb weight gain x 6 months.  Nutrition Discharge Planning: d/c on cardiac renal soft & bite sized diet    Nutrition/Diet History  Spiritual, Cultural Beliefs, Adventist Practices, Values that Affect Care: no  Food Allergies: NKFA  Factors Affecting Nutritional Intake: constipation, decreased appetite, nausea/vomiting, NPO    Anthropometrics  Height: 5' 9" (175.3 cm)  Height (inches): 69 in  Height Method: Stated  Weight: 85.9 kg (189 lb 6 oz)  Weight (lb): 189.38 lb  Weight Method: Bed Scale  Ideal Body Weight (IBW), Female: 145 lb  % Ideal Body Weight, Female (lb): 130.61 %  BMI (Calculated): 28  BMI Grade: 25 - 29.9 - overweight       Lab/Procedures/Meds  Pertinent Labs Reviewed: reviewed  Pertinent Labs Comments: glucose 212(H), sodium 133(L), CO2 13(L), (H), creatinine 4.1(H), albumin 3.1(L), phosphorus 7(H), eGFR 11(L)  Pertinent Medications Reviewed: reviewed  Pertinent Medications Comments: atorvastatin, furosemide, hydralazine, insulin, isosorbide mononitrate, lactated ringers, pantoprazole    Estimated/Assessed Needs  Weight Used For Calorie Calculations: 65.8 kg (145 lb) (IBW)  Energy Calorie Requirements (kcal): 1973 kcal  Energy Need Method: Kcal/kg (30)  Protein Requirements: 65 g (0.8 g/kg)  Weight Used For Protein Calculations: 65.8 kg (145 lb) (IBW)  Estimated Fluid Requirement Method: RDA Method  RDA Method (mL): 1973       Nutrition Prescription Ordered  Current Diet Order: NPO    Evaluation of Received Nutrient/Fluid Intake  I/O: 250/400  Energy Calories Required: not meeting needs  Protein Required: not meeting needs  Fluid Required: not meeting needs  Comments: LBM: 2/16  Tolerance:  (NPO)  % Intake of Estimated Energy Needs: 0 - 25 %  % Meal Intake: NPO    Nutrition Risk  Level of Risk/Frequency of Follow-up: low - moderate "     Nutrition Related Social Determinants of Health:  SDOH: Adequate food in home environment  Food Insecurity: No Food Insecurity (2/16/2025)    Hunger Vital Sign     Worried About Running Out of Food in the Last Year: Never true     Ran Out of Food in the Last Year: Never true     Monitor and Evaluation  Food and Nutrient Intake: energy intake, food and beverage intake  Food and Nutrient Adminstration: diet order  Physical Activity and Function: nutrition-related ADLs and IADLs  Anthropometric Measurements: body mass index, weight change  Biochemical Data, Medical Tests and Procedures: electrolyte and renal panel, gastrointestinal profile, glucose/endocrine profile, lipid profile, inflammatory profile  Nutrition-Focused Physical Findings: overall appearance     Nutrition Follow-Up  RD Follow-up?: Yes

## 2025-02-17 NOTE — PROCEDURES
"Lorena Contreras is a 74 y.o. female patient.    Temp: 97.8 °F (36.6 °C) (02/17/25 0800)  Pulse: (!) 38 (02/17/25 0800)  Resp: (!) 23 (02/17/25 0800)  BP: (!) 134/53 (02/17/25 0800)  SpO2: 100 % (02/17/25 0800)  Weight: 85.9 kg (189 lb 6 oz) (02/15/25 0900)  Height: 5' 9" (175.3 cm) (02/15/25 0900)  Mallampati Scale: Class II  ASA Classification: Class 3    Procedures    Dual pacemaker   Dr Rico  Pre-op Dx CHB  Post-op Dx same  Specimen none  EBL < 50 cc    2/17/25 Medtronic dual pacemaker placed left SC vein    Post-op CXR and po keflex  Sling overnight then PRN  Ok for telemetry later today  Resume ASA/brilinta in AM if site stable  Staples out 2 weeks    2/17/2025    "

## 2025-02-17 NOTE — ASSESSMENT & PLAN NOTE
Patient's FSGs are controlled on current medication regimen.  Last A1c reviewed-   Lab Results   Component Value Date    HGBA1C 5.9 (H) 01/14/2025     Most recent fingerstick glucose reviewed-   Recent Labs   Lab 02/16/25  1653 02/16/25  2105 02/17/25  0746   POCTGLUCOSE 234* 239* 279*       Current correctional scale  Medium  Maintain anti-hyperglycemic dose as follows-   Antihyperglycemics (From admission, onward)    Start     Stop Route Frequency Ordered    02/16/25 1645  insulin aspart U-100 pen 5 Units         -- SubQ 3 times daily with meals 02/16/25 1146    02/15/25 2100  insulin glargine U-100 (Lantus) pen 10 Units         -- SubQ Nightly 02/15/25 1257    02/15/25 1358  insulin aspart U-100 pen 0-10 Units         -- SubQ Before meals & nightly PRN 02/15/25 1258        Hold Oral hypoglycemics while patient is in the hospital.; cardiac consistent carbohydrate diet; monitor blood glucose log and titrate to effect  Prandial aspart added

## 2025-02-17 NOTE — ASSESSMENT & PLAN NOTE
Baseline creatinine 2.0  On presentation creatinine 3.5    KYA due to cardiogenic shock secondary to complete heart block  2/17 pacemaker placed  KYA worsening    Plan/Recommendation  No acute indication for dialysis, IVF or diuretics at this time, hopeful that restoration of cardiac function after pacemaker provides adequate CO for improvement of creatinine.  We discussed the possibility of dialysis and that we are hopeful she will not need it but may for a short time.  Continue to monitor kidney function and UOP  -Keep MAP > 65  -Keep hemoglobin > 7  -Strict ins and outs  -Avoid nephrotoxic agents if possible and renally dose medications  -Avoid drastic hemodynamic changes if possible    Hyponatremia - likely due to KYA, continue to monitor  Metabolic acidosis - due to KYA, continue to monitor. If continues to decrease tomorrow AM please obtain an ABG to confirm acidosis, hesitant to start IV bicarb unless absolutely necessary

## 2025-02-17 NOTE — NURSING TRANSFER
Nursing Transfer Note      2/17/2025   5:57 PM    Nurse giving handoff:  YADI  RN  Nurse receiving handoff:  MARY    Reason patient is being transferred: level of care    Transfer TO 4 th floor Room no 418    Transfer via bed    Transfer with cardiac monitoring     Transported by  RN and transporter    Transfer Vital Signs:  Blood Pressure:117/56  Heart Rate:108  O2:98  Temperature:98.4  Respirations:19    Telemetry: [id number]  Order for Tele Monitor? Yes     Additional Lines: na    Medicines sent: Insulin and Mupirocin oinment    Any special needs or follow-up needed:  no    Patient belongings transferred with patient: Yes     Chart send with patient: Yes     Notified:Patient notify to daughter    Patient reassessed at:  (date, time) 2/17/2025  6pm    Upon arrival to floor: cardiac monitor applied, patient oriented to room, call bell in reach, and bed in lowest position

## 2025-02-17 NOTE — PLAN OF CARE
Recommendation:  1. When medically aceptable, restart pt on cardiac renal non-dialysis soft & bite sized diet  2. Encourage intake at meals as tolerated  3. Monitor weight/labs  4. RD to follow to monitor PO intake    Goals: Pt to meet 50-75% EEN/EPN by RD follow-up  Nutrition Goal Status: new

## 2025-02-18 PROBLEM — E87.1 HYPONATREMIA: Status: ACTIVE | Noted: 2025-02-18

## 2025-02-18 LAB
ALLENS TEST: ABNORMAL
ANION GAP SERPL CALC-SCNC: 14 MMOL/L (ref 8–16)
BASOPHILS # BLD AUTO: 0.02 K/UL (ref 0–0.2)
BASOPHILS NFR BLD: 0.1 % (ref 0–1.9)
BUN SERPL-MCNC: 100 MG/DL (ref 8–23)
CALCIUM SERPL-MCNC: 8.7 MG/DL (ref 8.7–10.5)
CHLORIDE SERPL-SCNC: 104 MMOL/L (ref 95–110)
CO2 SERPL-SCNC: 15 MMOL/L (ref 23–29)
CREAT SERPL-MCNC: 4.1 MG/DL (ref 0.5–1.4)
DELSYS: ABNORMAL
DIFFERENTIAL METHOD BLD: ABNORMAL
EOSINOPHIL # BLD AUTO: 0 K/UL (ref 0–0.5)
EOSINOPHIL NFR BLD: 0 % (ref 0–8)
ERYTHROCYTE [DISTWIDTH] IN BLOOD BY AUTOMATED COUNT: 15.5 % (ref 11.5–14.5)
EST. GFR  (NO RACE VARIABLE): 11 ML/MIN/1.73 M^2
GLUCOSE SERPL-MCNC: 354 MG/DL (ref 70–110)
HCO3 UR-SCNC: 16.4 MMOL/L (ref 24–28)
HCT VFR BLD AUTO: 28.1 % (ref 37–48.5)
HGB BLD-MCNC: 8.7 G/DL (ref 12–16)
IMM GRANULOCYTES # BLD AUTO: 0.1 K/UL (ref 0–0.04)
IMM GRANULOCYTES NFR BLD AUTO: 0.6 % (ref 0–0.5)
LYMPHOCYTES # BLD AUTO: 0.5 K/UL (ref 1–4.8)
LYMPHOCYTES NFR BLD: 3 % (ref 18–48)
MCH RBC QN AUTO: 28.9 PG (ref 27–31)
MCHC RBC AUTO-ENTMCNC: 31 G/DL (ref 32–36)
MCV RBC AUTO: 93 FL (ref 82–98)
MONOCYTES # BLD AUTO: 1.4 K/UL (ref 0.3–1)
MONOCYTES NFR BLD: 8 % (ref 4–15)
NEUTROPHILS # BLD AUTO: 15.7 K/UL (ref 1.8–7.7)
NEUTROPHILS NFR BLD: 88.3 % (ref 38–73)
NRBC BLD-RTO: 0 /100 WBC
OHS QRS DURATION: 80 MS
OHS QTC CALCULATION: 478 MS
PCO2 BLDA: 34.9 MMHG (ref 35–45)
PH SMN: 7.28 [PH] (ref 7.35–7.45)
PLATELET # BLD AUTO: 190 K/UL (ref 150–450)
PMV BLD AUTO: 12.9 FL (ref 9.2–12.9)
PO2 BLDA: 76 MMHG (ref 80–100)
POC BE: -10 MMOL/L
POC SATURATED O2: 93 % (ref 95–100)
POC TCO2: 17 MMOL/L (ref 23–27)
POCT GLUCOSE: 192 MG/DL (ref 70–110)
POCT GLUCOSE: 246 MG/DL (ref 70–110)
POCT GLUCOSE: 300 MG/DL (ref 70–110)
POCT GLUCOSE: 420 MG/DL (ref 70–110)
POTASSIUM SERPL-SCNC: 5.1 MMOL/L (ref 3.5–5.1)
RBC # BLD AUTO: 3.01 M/UL (ref 4–5.4)
SAMPLE: ABNORMAL
SITE: ABNORMAL
SODIUM SERPL-SCNC: 133 MMOL/L (ref 136–145)
WBC # BLD AUTO: 17.71 K/UL (ref 3.9–12.7)

## 2025-02-18 PROCEDURE — 36600 WITHDRAWAL OF ARTERIAL BLOOD: CPT | Mod: HCNC

## 2025-02-18 PROCEDURE — 25000003 PHARM REV CODE 250: Mod: HCNC | Performed by: STUDENT IN AN ORGANIZED HEALTH CARE EDUCATION/TRAINING PROGRAM

## 2025-02-18 PROCEDURE — 63600175 PHARM REV CODE 636 W HCPCS: Mod: JZ,TB,HCNC | Performed by: HOSPITALIST

## 2025-02-18 PROCEDURE — 97530 THERAPEUTIC ACTIVITIES: CPT | Mod: HCNC

## 2025-02-18 PROCEDURE — 21400001 HC TELEMETRY ROOM: Mod: HCNC

## 2025-02-18 PROCEDURE — 97165 OT EVAL LOW COMPLEX 30 MIN: CPT | Mod: HCNC

## 2025-02-18 PROCEDURE — 36415 COLL VENOUS BLD VENIPUNCTURE: CPT | Mod: HCNC | Performed by: STUDENT IN AN ORGANIZED HEALTH CARE EDUCATION/TRAINING PROGRAM

## 2025-02-18 PROCEDURE — 80048 BASIC METABOLIC PNL TOTAL CA: CPT | Mod: HCNC | Performed by: STUDENT IN AN ORGANIZED HEALTH CARE EDUCATION/TRAINING PROGRAM

## 2025-02-18 PROCEDURE — 25000003 PHARM REV CODE 250: Mod: HCNC | Performed by: INTERNAL MEDICINE

## 2025-02-18 PROCEDURE — 99024 POSTOP FOLLOW-UP VISIT: CPT | Mod: HCNC,,, | Performed by: INTERNAL MEDICINE

## 2025-02-18 PROCEDURE — 82803 BLOOD GASES ANY COMBINATION: CPT | Mod: HCNC

## 2025-02-18 PROCEDURE — 94760 N-INVAS EAR/PLS OXIMETRY 1: CPT | Mod: HCNC

## 2025-02-18 PROCEDURE — 85025 COMPLETE CBC W/AUTO DIFF WBC: CPT | Mod: HCNC | Performed by: STUDENT IN AN ORGANIZED HEALTH CARE EDUCATION/TRAINING PROGRAM

## 2025-02-18 PROCEDURE — 99900035 HC TECH TIME PER 15 MIN (STAT): Mod: HCNC

## 2025-02-18 PROCEDURE — 99232 SBSQ HOSP IP/OBS MODERATE 35: CPT | Mod: HCNC,,, | Performed by: STUDENT IN AN ORGANIZED HEALTH CARE EDUCATION/TRAINING PROGRAM

## 2025-02-18 PROCEDURE — 97110 THERAPEUTIC EXERCISES: CPT | Mod: HCNC

## 2025-02-18 PROCEDURE — 25000003 PHARM REV CODE 250: Mod: HCNC | Performed by: HOSPITALIST

## 2025-02-18 PROCEDURE — 63600175 PHARM REV CODE 636 W HCPCS: Mod: HCNC | Performed by: EMERGENCY MEDICINE

## 2025-02-18 PROCEDURE — 25000242 PHARM REV CODE 250 ALT 637 W/ HCPCS: Mod: HCNC | Performed by: INTERNAL MEDICINE

## 2025-02-18 PROCEDURE — 97161 PT EVAL LOW COMPLEX 20 MIN: CPT | Mod: HCNC

## 2025-02-18 PROCEDURE — 25000003 PHARM REV CODE 250: Mod: HCNC | Performed by: EMERGENCY MEDICINE

## 2025-02-18 RX ORDER — HYDROXYZINE PAMOATE 25 MG/1
50 CAPSULE ORAL EVERY 8 HOURS PRN
Status: DISCONTINUED | OUTPATIENT
Start: 2025-02-18 | End: 2025-02-25 | Stop reason: HOSPADM

## 2025-02-18 RX ORDER — CEPHALEXIN 500 MG/1
500 CAPSULE ORAL EVERY 8 HOURS
Status: DISCONTINUED | OUTPATIENT
Start: 2025-02-18 | End: 2025-02-19 | Stop reason: ALTCHOICE

## 2025-02-18 RX ORDER — SODIUM CHLORIDE 9 MG/ML
INJECTION, SOLUTION INTRAVENOUS CONTINUOUS
Status: ACTIVE | OUTPATIENT
Start: 2025-02-18 | End: 2025-02-18

## 2025-02-18 RX ORDER — CEPHALEXIN 500 MG/1
500 CAPSULE ORAL EVERY 12 HOURS
Status: DISCONTINUED | OUTPATIENT
Start: 2025-02-18 | End: 2025-02-18

## 2025-02-18 RX ORDER — INSULIN GLARGINE 100 [IU]/ML
13 INJECTION, SOLUTION SUBCUTANEOUS NIGHTLY
Status: DISCONTINUED | OUTPATIENT
Start: 2025-02-18 | End: 2025-02-19

## 2025-02-18 RX ORDER — INDOMETHACIN 25 MG/1
150 CAPSULE ORAL ONCE
Status: COMPLETED | OUTPATIENT
Start: 2025-02-18 | End: 2025-02-18

## 2025-02-18 RX ORDER — ASPIRIN 81 MG/1
81 TABLET ORAL DAILY
Status: DISCONTINUED | OUTPATIENT
Start: 2025-02-18 | End: 2025-02-25 | Stop reason: HOSPADM

## 2025-02-18 RX ORDER — OXYCODONE HYDROCHLORIDE 10 MG/1
10 TABLET ORAL EVERY 12 HOURS PRN
Qty: 8 TABLET | Refills: 0 | Status: SHIPPED | OUTPATIENT
Start: 2025-02-18

## 2025-02-18 RX ORDER — CEPHALEXIN 500 MG/1
500 CAPSULE ORAL EVERY 8 HOURS
Qty: 9 CAPSULE | Refills: 0 | Status: SHIPPED | OUTPATIENT
Start: 2025-02-18

## 2025-02-18 RX ORDER — INSULIN ASPART 100 [IU]/ML
8 INJECTION, SOLUTION INTRAVENOUS; SUBCUTANEOUS
Status: DISCONTINUED | OUTPATIENT
Start: 2025-02-18 | End: 2025-02-21

## 2025-02-18 RX ADMIN — ASPIRIN 81 MG: 81 TABLET, COATED ORAL at 12:02

## 2025-02-18 RX ADMIN — INSULIN ASPART 6 UNITS: 100 INJECTION, SOLUTION INTRAVENOUS; SUBCUTANEOUS at 09:02

## 2025-02-18 RX ADMIN — ATORVASTATIN CALCIUM 80 MG: 40 TABLET, FILM COATED ORAL at 09:02

## 2025-02-18 RX ADMIN — ISOSORBIDE MONONITRATE 60 MG: 30 TABLET, EXTENDED RELEASE ORAL at 09:02

## 2025-02-18 RX ADMIN — SODIUM BICARBONATE 150 MEQ: 84 INJECTION, SOLUTION INTRAVENOUS at 08:02

## 2025-02-18 RX ADMIN — TICAGRELOR 60 MG: 60 TABLET ORAL at 09:02

## 2025-02-18 RX ADMIN — INSULIN ASPART 10 UNITS: 100 INJECTION, SOLUTION INTRAVENOUS; SUBCUTANEOUS at 12:02

## 2025-02-18 RX ADMIN — INSULIN ASPART 1 UNITS: 100 INJECTION, SOLUTION INTRAVENOUS; SUBCUTANEOUS at 09:02

## 2025-02-18 RX ADMIN — MUPIROCIN: 20 OINTMENT TOPICAL at 09:02

## 2025-02-18 RX ADMIN — INSULIN ASPART 5 UNITS: 100 INJECTION, SOLUTION INTRAVENOUS; SUBCUTANEOUS at 09:02

## 2025-02-18 RX ADMIN — CEPHALEXIN 500 MG: 500 CAPSULE ORAL at 02:02

## 2025-02-18 RX ADMIN — HYDRALAZINE HYDROCHLORIDE 100 MG: 25 TABLET ORAL at 09:02

## 2025-02-18 RX ADMIN — INSULIN GLARGINE 13 UNITS: 100 INJECTION, SOLUTION SUBCUTANEOUS at 09:02

## 2025-02-18 RX ADMIN — CEPHALEXIN 500 MG: 500 CAPSULE ORAL at 06:02

## 2025-02-18 RX ADMIN — HYDROMORPHONE HYDROCHLORIDE 1 MG: 2 INJECTION, SOLUTION INTRAMUSCULAR; INTRAVENOUS; SUBCUTANEOUS at 06:02

## 2025-02-18 RX ADMIN — OXYCODONE 10 MG: 5 TABLET ORAL at 02:02

## 2025-02-18 RX ADMIN — HYDRALAZINE HYDROCHLORIDE 100 MG: 25 TABLET ORAL at 02:02

## 2025-02-18 RX ADMIN — LORAZEPAM 1 MG: 0.5 TABLET ORAL at 01:02

## 2025-02-18 RX ADMIN — INSULIN ASPART 5 UNITS: 100 INJECTION, SOLUTION INTRAVENOUS; SUBCUTANEOUS at 12:02

## 2025-02-18 RX ADMIN — TICAGRELOR 60 MG: 60 TABLET ORAL at 12:02

## 2025-02-18 RX ADMIN — HYDRALAZINE HYDROCHLORIDE 100 MG: 25 TABLET ORAL at 06:02

## 2025-02-18 RX ADMIN — SODIUM CHLORIDE: 9 INJECTION, SOLUTION INTRAVENOUS at 12:02

## 2025-02-18 RX ADMIN — INSULIN ASPART 8 UNITS: 100 INJECTION, SOLUTION INTRAVENOUS; SUBCUTANEOUS at 05:02

## 2025-02-18 RX ADMIN — ONDANSETRON 4 MG: 2 INJECTION INTRAMUSCULAR; INTRAVENOUS at 09:02

## 2025-02-18 RX ADMIN — FUROSEMIDE 40 MG: 40 TABLET ORAL at 09:02

## 2025-02-18 RX ADMIN — FLUOXETINE HYDROCHLORIDE 40 MG: 20 CAPSULE ORAL at 09:02

## 2025-02-18 RX ADMIN — CEPHALEXIN 500 MG: 500 CAPSULE ORAL at 09:02

## 2025-02-18 RX ADMIN — INSULIN ASPART 4 UNITS: 100 INJECTION, SOLUTION INTRAVENOUS; SUBCUTANEOUS at 05:02

## 2025-02-18 RX ADMIN — PANTOPRAZOLE SODIUM 40 MG: 40 TABLET, DELAYED RELEASE ORAL at 09:02

## 2025-02-18 RX ADMIN — SODIUM BICARBONATE 150 MEQ: 84 INJECTION, SOLUTION INTRAVENOUS at 06:02

## 2025-02-18 NOTE — PLAN OF CARE
Problem: Adult Inpatient Plan of Care  Goal: Plan of Care Review  Outcome: Progressing  Goal: Patient-Specific Goal (Individualized)  Outcome: Progressing  Goal: Absence of Hospital-Acquired Illness or Injury  Outcome: Progressing  Goal: Optimal Comfort and Wellbeing  Outcome: Progressing  Goal: Readiness for Transition of Care  Outcome: Progressing     Problem: Diabetes Comorbidity  Goal: Blood Glucose Level Within Targeted Range  Outcome: Progressing     Problem: Acute Kidney Injury/Impairment  Goal: Fluid and Electrolyte Balance  Outcome: Progressing  Goal: Improved Oral Intake  Outcome: Progressing  Goal: Effective Renal Function  Outcome: Progressing     Problem: Fall Injury Risk  Goal: Absence of Fall and Fall-Related Injury  Outcome: Progressing     Problem: Skin Injury Risk Increased  Goal: Skin Health and Integrity  Outcome: Progressing     Problem: Wound  Goal: Optimal Pain Control and Function  Outcome: Met  Goal: Skin Health and Integrity  Outcome: Met

## 2025-02-18 NOTE — PROGRESS NOTES
Pharmacist Renal Dose Adjustment Note    Lorena Contreras is a 74 y.o. female being treated with the medication cephalexin    Patient Data:    Vital Signs (Most Recent):  Temp: 97.9 °F (36.6 °C) (02/18/25 0726)  Pulse: 94 (02/18/25 0808)  Resp: 18 (02/18/25 0808)  BP: (!) 109/58 (02/18/25 0726)  SpO2: 96 % (02/18/25 0808) Vital Signs (72h Range):  Temp:  [97.2 °F (36.2 °C)-98.7 °F (37.1 °C)]   Pulse:  []   Resp:  [11-52]   BP: (109-183)/(49-84)   SpO2:  [95 %-100 %]      Recent Labs   Lab 02/16/25  1114 02/17/25  1200 02/18/25 0726   CREATININE 3.8* 4.1* 4.1*     Serum creatinine: 4.1 mg/dL (H) 02/18/25 0726  Estimated creatinine clearance: 14.1 mL/min (A)    Medication:cephalexin 500mg q8hr frequency will be changed to medication:Cephalexin 500mg q12 frequency    Pharmacist's Name: Ariane Pickett  Pharmacist's Extension: [phone]

## 2025-02-18 NOTE — SUBJECTIVE & OBJECTIVE
Interval History:  No acute overnight events.  Patient remained afebrile.  Patient is very anxious on exam.  Complains of neck pain, but no numbness or tingling.  States neck pain has been chronic.  Denies any chest pain or shortness for breath.  States she is feeling anxious and gets nauseous with her anxiety.  Able to tolerate p.o. intake.    Review of Systems   Constitutional:  Negative for fatigue and fever.   Eyes:  Negative for visual disturbance.   Respiratory:  Negative for cough, chest tightness and shortness of breath.    Cardiovascular:  Negative for chest pain and palpitations.   Gastrointestinal:  Positive for nausea. Negative for abdominal pain and vomiting.   Genitourinary:  Negative for difficulty urinating, dysuria, frequency and urgency.   Musculoskeletal:  Positive for neck pain.   Neurological:  Negative for dizziness and headaches.   Psychiatric/Behavioral:  Negative for confusion. The patient is nervous/anxious.      Objective:     Vital Signs (Most Recent):  Temp: 97.2 °F (36.2 °C) (02/18/25 1054)  Pulse: 95 (02/18/25 1054)  Resp: 18 (02/18/25 1054)  BP: 120/71 (02/18/25 1054)  SpO2: 95 % (02/18/25 1054) Vital Signs (24h Range):  Temp:  [97.2 °F (36.2 °C)-98.5 °F (36.9 °C)] 97.2 °F (36.2 °C)  Pulse:  [] 95  Resp:  [15-27] 18  SpO2:  [95 %-99 %] 95 %  BP: (109-130)/(51-71) 120/71     Weight: 85.9 kg (189 lb 6 oz)  Body mass index is 27.97 kg/m².    Intake/Output Summary (Last 24 hours) at 2/18/2025 1415  Last data filed at 2/18/2025 1026  Gross per 24 hour   Intake 120 ml   Output 650 ml   Net -530 ml         Physical Exam  Vitals and nursing note reviewed.   Constitutional:       General: She is not in acute distress.     Appearance: She is not ill-appearing.   HENT:      Mouth/Throat:      Mouth: Mucous membranes are moist.   Cardiovascular:      Rate and Rhythm: Normal rate and regular rhythm.      Pulses: Normal pulses.   Pulmonary:      Effort: Pulmonary effort is normal. No  respiratory distress.      Breath sounds: Normal breath sounds. No wheezing or rales.      Comments: On room air.  Clean surgical dressing on left anterior chest wall  Abdominal:      General: There is no distension.      Palpations: Abdomen is soft.      Tenderness: There is no abdominal tenderness.   Musculoskeletal:      Right lower leg: No edema.      Left lower leg: No edema.   Neurological:      General: No focal deficit present.      Mental Status: She is alert and oriented to person, place, and time.   Psychiatric:         Mood and Affect: Mood is anxious.      Comments: Patient very anxious on exam             Significant Labs: All pertinent labs within the past 24 hours have been reviewed.    Significant Imaging: I have reviewed all pertinent imaging results/findings within the past 24 hours.

## 2025-02-18 NOTE — SUBJECTIVE & OBJECTIVE
Interval History: no acute events overnight    Review of patient's allergies indicates:   Allergen Reactions    Novolin 70/30 (semi-synthetic) Nausea And Vomiting     Severe vomiting on Relion 70/30    Sulfa (sulfonamide antibiotics) Anaphylaxis    Talwin [pentazocine lactate] Anaphylaxis    Victoza [liraglutide] Nausea And Vomiting    Glipizide Nausea Only    Codeine     Influenza virus vaccines Hives    Iodine and iodide containing products Hives    Levetiracetam Itching    Lyrica [pregabalin] Hallucinations    Neurontin [gabapentin]      Possible associated myoclonic jerk    Rituxan [rituximab] Hives    Zoloft [sertraline] Nausea And Vomiting     Current Facility-Administered Medications   Medication Frequency    0.9% NaCl infusion Continuous    acetaminophen tablet 650 mg Q4H PRN    albuterol-ipratropium 2.5 mg-0.5 mg/3 mL nebulizer solution 3 mL Q4H PRN    aspirin EC tablet 81 mg Daily    atorvastatin tablet 80 mg QHS    cephALEXin capsule 500 mg Q8H    dextrose 50% injection 12.5 g PRN    dextrose 50% injection 25 g PRN    FLUoxetine capsule 40 mg Daily    furosemide tablet 40 mg BID    glucagon (human recombinant) injection 1 mg PRN    glucose chewable tablet 16 g PRN    glucose chewable tablet 24 g PRN    hydrALAZINE injection 10 mg Q4H PRN    hydrALAZINE tablet 100 mg Q8H    HYDROmorphone (PF) injection 1 mg Q6H PRN    hydrOXYzine pamoate capsule 50 mg Q8H PRN    insulin aspart U-100 pen 0-10 Units QID (AC + HS) PRN    insulin aspart U-100 pen 8 Units TIDWM    insulin glargine U-100 (Lantus) pen 13 Units QHS    isosorbide mononitrate 24 hr tablet 60 mg Daily    LORazepam tablet 1 mg Daily PRN    melatonin tablet 6 mg Nightly PRN    mupirocin 2 % ointment BID    ondansetron injection 4 mg Q8H PRN    oxyCODONE immediate release tablet 10 mg Q4H PRN    pantoprazole EC tablet 40 mg Daily    pneumoc 20-michael conj-dip cr(PF) (PREVNAR-20 (PF)) injection Syrg 0.5 mL vaccine x 1 dose    simethicone chewable tablet 80  mg TID PRN    sodium chloride 0.9% flush 10 mL PRN    sodium chloride 0.9% flush 10 mL PRN    ticagrelor tablet 60 mg BID       Objective:     Vital Signs (Most Recent):  Temp: 97.2 °F (36.2 °C) (02/18/25 1054)  Pulse: 95 (02/18/25 1451)  Resp: 18 (02/18/25 1442)  BP: 120/71 (02/18/25 1054)  SpO2: 95 % (02/18/25 1054) Vital Signs (24h Range):  Temp:  [97.2 °F (36.2 °C)-98.5 °F (36.9 °C)] 97.2 °F (36.2 °C)  Pulse:  [] 95  Resp:  [15-27] 18  SpO2:  [95 %-99 %] 95 %  BP: (109-130)/(53-71) 120/71     Weight: 85.9 kg (189 lb 6 oz) (02/17/25 1616)  Body mass index is 27.97 kg/m².  Body surface area is 2.04 meters squared.    I/O last 3 completed shifts:  In: 692.7 [P.O.:250; I.V.:442.7]  Out: 1050 [Urine:1050]     Physical Exam  Constitutional:       General: She is not in acute distress.     Appearance: She is not ill-appearing or toxic-appearing.   HENT:      Nose: Nose normal. No congestion.      Mouth/Throat:      Mouth: Mucous membranes are moist.   Eyes:      Pupils: Pupils are equal, round, and reactive to light.   Cardiovascular:      Rate and Rhythm: Tachycardia present.      Heart sounds: Murmur heard.      Comments: Paced   Skin:     Findings: Bruising present.   Neurological:      Mental Status: She is alert.          Significant Labs:  All labs within the past 24 hours have been reviewed.     Significant Imaging:  Labs: Reviewed

## 2025-02-18 NOTE — ASSESSMENT & PLAN NOTE
KYA is likely due to pre-renal azotemia due to intravascular volume depletion. Baseline creatinine is  1.6 to 2.2 . Most recent creatinine and eGFR are listed below.  Recent Labs     02/16/25  1114 02/17/25  1200 02/18/25  0726   CREATININE 3.8* 4.1* 4.1*   EGFRNORACEVR 12* 11* 11*        Plan  - KYA is worsening. Will adjust treatment as follows:  IVF  - Avoid nephrotoxins and renally dose meds for GFR listed above  - Monitor urine output, serial BMP, and adjust therapy as needed  - nephrology consulted; input appreciated

## 2025-02-18 NOTE — ASSESSMENT & PLAN NOTE
Hx of chest pain on presentation; different from previous anginal pains  EKG showed complete heart block  Troponin not elevated  Discontinue all AV frances blocking agents  Monitor patient on telemetry  S/p Medtronic PPM on 02/17  Continue Keflex; arm sling per Cardiology  Restart aspirin and Brilinta 02/18/25

## 2025-02-18 NOTE — ASSESSMENT & PLAN NOTE
Patient with known CAD s/p stent placement and CABG, which is controlled Will continue Statin; hold aspirin and Plavix setting of impending pacing and monitor for S/Sx of angina/ACS. Continue to monitor on telemetry.     -repeat ischemic evaluation of after PPM; defer timing to cardiology  -cardiology follow-up outpatient

## 2025-02-18 NOTE — PT/OT/SLP EVAL
Occupational Therapy   Evaluation    Name: Lorena Contreras  MRN: [id number]  Admitting Diagnosis: Complete heart block  Recent Surgery: Procedure(s) (LRB):  INSERTION, CARDIAC PACEMAKER, DUAL CHAMBER (N/A) 1 Day Post-Op    Recommendations:     Discharge Recommendations: Low Intensity Therapy (Resume Outpatient PT/OT)  Discharge Equipment Recommendations:  none  Barriers to discharge:  None    Assessment:     Lorena Contreras is a 74 y.o. female with a medical diagnosis of Complete heart block.  Upon OT initial arrival to room, pt axifarzana and price, RN assisted to calm pt 2/2 incorrect information and pt asked OT to return later in the day. She presents with deconditioning, reports that she had ambulated with PT and was very tired.  Performance deficits affecting function: weakness, impaired endurance, impaired sensation, impaired self care skills, impaired functional mobility, gait instability, impaired balance, decreased upper extremity function, decreased lower extremity function, pain, impaired coordination, impaired fine motor, impaired cardiopulmonary response to activity, impaired skin.      Rehab Prognosis: Good; patient would benefit from acute skilled OT services to address these deficits and reach maximum level of function.       Plan:     Patient to be seen 3 x/week to address the above listed problems via self-care/home management, therapeutic activities, therapeutic exercises, neuromuscular re-education  Plan of Care Expires: 03/18/25  Plan of Care Reviewed with: patient    Subjective     Chief Complaint: Pt reports fatigue, was fearful that she needed another surgery which cause generalized tremulous movement  Patient/Family Comments/goals: To return to PLOF, pain relief L hand    Occupational Profile:  Living Environment: Pt lives with grandson and his girlfriend in [facility], 4STE, L HR, tub/sh  Previous level of function: Mod I ADLs and functional mobility, mostly with use of w/c and RW  Roles  and Routines: Caretaker to self and home; prior to significant and varied injury in August 2024 (L hand crushed, fractured R ankle, fractured R elbow, etc per pt report) Pt states that she was Mod I for ADLs and iADLs but since the injuries, pt reports light housework and meal prep, no longer driving or grocery shopping. Pt states that she enjoys all sorts of crafts including margaret but since accident she has not been able to tolerate crocheting. Pt states she still does gem/janelle pictures, and enjoys gardening fruits, vegetables, and flowers though not as able to complete larger projects since she has been mostly w/c bound outside of home  Equipment Used at Home: bedside commode, bath bench, walker, rolling, wheelchair, rollator  Assistance upon Discharge: Family    Pain/Comfort:  Pain Rating 1: 8/10  Location - Side 1: Left  Location - Orientation 1: generalized  Location 1: hand  Pain Addressed 1: Reposition, Distraction, Cessation of Activity  Pain Rating Post-Intervention 1:  (did not rate)    Patients cultural, spiritual, Confucianist conflicts given the current situation:      Objective:     Communicated with: nsg prior to session.  Patient found HOB elevated with bed alarm, peripheral IV, telemetry, PureWick upon OT entry to room.    General Precautions: Standard, fall, pacemaker  Orthopedic Precautions: N/A  Braces: N/A  Respiratory Status: Room air    Occupational Performance:    Bed Mobility/Functional Mobility/Transfers:  Functional Mobility: Pt deferred EOB/OOB activity 2/2 pain and fatigue as she had recently been up with PT but reports tolerating ambulation in room well    Activities of Daily Living:  Lower Body Dressing: maximal assistance to don/doff B socks supine in bed as pt reports she typically sits up to put on her socks    Cognitive/Visual Perceptual:  Cognitive/Psychosocial Skills:     -       Oriented to: Person, Place, Time and Situation   -       Follows Commands/attention:Follows  multistep  commands  -       Communication: clear/fluent  -       Memory: No Deficits noted  -       Safety awareness/insight to disability: intact   -       Mood/Affect/Coping skills/emotional control: Appropriate to situation    Physical Exam:  Postural examination/scapula alignment:    -       Rounded shoulders, forward head  Skin integrity: Visible skin intact  Sensation:    -       Intact  Motor Planning:    -       WFL  Dominant hand:    -       R handed  Upper Extremity Range of Motion: RUE WFL, LUE  shoulder flexion 0-90* 2/2 PM and some increased discomfort at 90*  Upper Extremity Strength:  BUE WFL   Strength:  B hands WFL  Fine Motor Coordination:    -       Intact R hand, decreased L hand 2/2 recent h/o nerve damage Aug 2024  Gross motor coordination:   WFL     AMPAC 6 Click ADL:  AMPAC Total Score: 17    Treatment & Education:  Pt educated on role of OT and POC.   Pt performing skills as listed above.     Patient left HOB elevated with all lines intact, call button in reach, bed alarm on, nsg notified    GOALS:   Multidisciplinary Problems       Occupational Therapy Goals          Problem: Occupational Therapy    Goal Priority Disciplines Outcome Interventions   Occupational Therapy Goal     OT, PT/OT Progressing    Description: Goals to be met by: 03/18/2025      Patient will increase functional independence with ADLs by performing:    UE Dressing with Modified [place].  LE Dressing with Modified [place].  Grooming while seated or standing with Modified [place].  Toileting from toilet or BSC with Modified [place] for hygiene and clothing management.   Toilet transfer to toilet or BSC with Modified [place].  Increased functional strength to WF for self care.  Upper extremity exercise program x10 reps per handout, with assistance as needed.                          History:     Past Medical History:   Diagnosis Date    Age-related osteoporosis with current pathological  fracture with routine healing 11/13/2024    Allergy     Altered mental status 06/19/2022    DYSARTHRIA, SPASTIC MOVEMENTS & DIFFICULTY SWALLOWING    Anemia     Anxiety     Arthritis     Cataract     both removed    Colon polyps     Coronary artery disease     Depression     Diabetes mellitus, type II     Disorder of kidney and ureter     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/2023    Fibromyalgia     Follicular lymphoma     GERD (gastroesophageal reflux disease)     HTN (hypertension)     Hyperlipidemia     MI (myocardial infarction) 03/2019    Personal history of colonic polyps     Restless leg syndrome     Stroke          Past Surgical History:   Procedure Laterality Date    A-V CARDIAC PACEMAKER INSERTION N/A 2/17/2025    Procedure: INSERTION, CARDIAC PACEMAKER, DUAL CHAMBER;  Surgeon: Karl Rico MD;  Location: [facility] CATH LAB;  Service: Cardiology;  Laterality: N/A;    APPLICATION OF WOUND VACUUM-ASSISTED CLOSURE DEVICE Right 9/25/2024    Procedure: APPLICATION, WOUND VAC;  Surgeon: Neto Davalos MD;  Location: [address];  Service: Orthopedics;  Laterality: Right;    COLONOSCOPY  11/07/2012    Colon polyp found; repeat in 5 years    COLONOSCOPY N/A 11/4/2024    Procedure: COLONOSCOPY;  Surgeon: David Castaneda MD;  Location: [facility] ([address] [place] [address]);  Service: Endoscopy;  Laterality: N/A;    DEBRIDEMENT OF LOCAL FLAP Right 9/25/2024    Procedure: DEBRIDEMENT, LOCAL FLAP;  Surgeon: Neto Davalos MD;  Location: [address];  Service: Orthopedics;  Laterality: Right;    ELBOW SURGERY Right 2015    dislocation repair     ESOPHAGOGASTRODUODENOSCOPY  11/07/2012    atrophic gastritis, H pylori testing negative    INCISION AND DRAINAGE FOOT Right 6/2/2021    Procedure: INCISION AND DRAINAGE, FOOT, bone biopsy;  Surgeon: Quiana Penn DPM;  Location: [facility] OR;  Service: Podiatry;  Laterality: Right;    IRRIGATION AND DEBRIDEMENT OF LOWER EXTREMITY Right 9/25/2024    Procedure: IRRIGATION  AND DEBRIDEMENT, LOWER EXTREMITY; slider table, supine, bone foam, cysto tubing, 6L NS/dakins/peroxide, culture swabs;  Surgeon: Neto Davalos MD;  [facility];  Service: Orthopedics;  Laterality: Right;    KNEE SURGERY Bilateral 2015    scoped    LEFT HEART CATHETERIZATION Left 3/29/2019    Procedure: Left heart cath;  Surgeon: Bladimir Barbosa MD;  Location: [facility] CATH LAB;  Service: Cardiology;  Laterality: Left;    LEFT HEART CATHETERIZATION Left 11/18/2019    Procedure: Left heart cath;  Surgeon: Karl Rico MD;  Location: [facility] CATH LAB;  Service: Cardiology;  Laterality: Left;    LEFT HEART CATHETERIZATION Left 1/8/2020    Procedure: Left heart cath, right radial, noon start;  Surgeon: Christos Monreal MD;  Location: [facility] CATH LAB;  Service: Cardiology;  Laterality: Left;  RN Pre Op 1-6-20.  To be admitted 1-7-20 sor Aspirin Disensitation    OPEN REDUCTION AND INTERNAL FIXATION (ORIF) OF FRACTURE OF ACETABULUM Right 8/16/2024    Procedure: ORIF, FRACTURE, ACETABULUM;  Surgeon: Neto Davalos MD;  [facility];  Service: Orthopedics;  Laterality: Right;  anterior and lateral pelvic incisions    OPEN REDUCTION AND INTERNAL FIXATION (ORIF) OF PILON FRACTURE Right 8/14/2024    Procedure: ORIF, FRACTURE, PILON;  Surgeon: Neto Davalos MD;  Location: [facility] OR [address] [place] [address];  Service: Orthopedics;  Laterality: Right;    TONSILLECTOMY  1955    ULTRASOUND GUIDANCE  1/8/2020    Procedure: Ultrasound Guidance;  Surgeon: Christos Monreal MD;  Location: [facility] CATH LAB;  Service: Cardiology;;       Time Tracking:     OT Date of Treatment: 02/18/25  OT Start Time: 1456  OT Stop Time: 1525  OT Total Time (min): 29 min    Billable Minutes:Evaluation 20  Self Care/Home Management 9    2/18/2025

## 2025-02-18 NOTE — PLAN OF CARE
Problem: Physical Therapy  Goal: Physical Therapy Goal  Description: Goals to be met by: 25     Patient will increase functional independence with mobility by performin. Supine to sit with Stand-by Assistance  2. Sit to stand transfer with Stand-by Assistance  3. Gait  x 25-50 feet with Stand-by Assistance using Rolling Walker.   4. Ascend/descend 4 stair with right Handrails and Minimal  Assistance .   5. Lower extremity exercise program x10 reps per handout, with supervision    Outcome: Progressing   Resume (Outpatient PT) Low Intensity Therapy at time of D/C.

## 2025-02-18 NOTE — ASSESSMENT & PLAN NOTE
Dressing changed - site C/D/I. A-sensed V-paced. Would hydrate today with IVF given N/V and rsising Cr. Ok for d/c in AM if stable. OV with me 2 weeks for staple removal. Resume ASA/brilinta. Rx for pain medication and Abx sent to pharmacy

## 2025-02-18 NOTE — SUBJECTIVE & OBJECTIVE
Interval History:     Review of Systems   Constitutional: Negative for decreased appetite.   HENT:  Negative for ear discharge.    Eyes:  Negative for blurred vision.   Respiratory:  Negative for hemoptysis.    Endocrine: Negative for polyphagia.   Hematologic/Lymphatic: Negative for adenopathy.   Skin:  Negative for color change.   Musculoskeletal:  Negative for joint swelling.   Genitourinary:  Negative for bladder incontinence.   Neurological:  Negative for brief paralysis.   Psychiatric/Behavioral:  Negative for hallucinations.    Allergic/Immunologic: Negative for hives.     Objective:     Vital Signs (Most Recent):  Temp: 97.9 °F (36.6 °C) (02/18/25 0726)  Pulse: 94 (02/18/25 0808)  Resp: 18 (02/18/25 0808)  BP: (!) 109/58 (02/18/25 0726)  SpO2: 96 % (02/18/25 0808) Vital Signs (24h Range):  Temp:  [97.4 °F (36.3 °C)-98.5 °F (36.9 °C)] 97.9 °F (36.6 °C)  Pulse:  [] 94  Resp:  [12-34] 18  SpO2:  [95 %-100 %] 96 %  BP: (109-133)/(51-60) 109/58     Weight: 85.9 kg (189 lb 6 oz)  Body mass index is 27.97 kg/m².     SpO2: 96 %         Intake/Output Summary (Last 24 hours) at 2/18/2025 0950  Last data filed at 2/17/2025 1800  Gross per 24 hour   Intake 250 ml   Output 650 ml   Net -400 ml       Lines/Drains/Airways       Drain  Duration             Female External Urinary Catheter w/ Suction 02/15/25 0011 3 days              Peripheral Intravenous Line  Duration                  Peripheral IV - Single Lumen 02/15/25 0610 20 G Anterior;Right Forearm 3 days         Peripheral IV - Single Lumen 02/15/25 0843 20 G Left Antecubital 3 days                       Physical Exam  Constitutional:       Appearance: She is well-developed.   HENT:      Head: Normocephalic and atraumatic.   Eyes:      Conjunctiva/sclera: Conjunctivae normal.      Pupils: Pupils are equal, round, and reactive to light.   Cardiovascular:      Rate and Rhythm: Normal rate.      Pulses: Intact distal pulses.      Heart sounds: Normal heart  sounds.   Pulmonary:      Effort: Pulmonary effort is normal.      Breath sounds: Normal breath sounds.   Abdominal:      General: Bowel sounds are normal.      Palpations: Abdomen is soft.   Musculoskeletal:         General: Normal range of motion.      Cervical back: Normal range of motion and neck supple.   Skin:     General: Skin is warm and dry.   Neurological:      Mental Status: She is alert and oriented to person, place, and time.            Significant Labs: All pertinent lab results from the last 24 hours have been reviewed.    Significant Imaging: Echocardiogram: 2D echo with color flow doppler: No results found. However, due to the size of the patient record, not all encounters were searched. Please check Results Review for a complete set of results.

## 2025-02-18 NOTE — ASSESSMENT & PLAN NOTE
Baseline creatinine 2.0  On presentation creatinine 3.5    KYA due to cardiogenic shock secondary to complete heart block  2/17 pacemaker placed  KYA stable    Plan/Recommendation  No acute indication for dialysis, IVF or diuretics at this time, hopeful that restoration of cardiac function after pacemaker provides adequate CO for improvement of creatinine.  We discussed the possibility of dialysis and that we are hopeful she will not need it but may for a short time.  Continue to monitor kidney function and UOP - suspect KYA cresting, hopeful for improvement in the next few days  Give 3 amp sodium bicarb for metabolic acidosis  -Keep MAP > 65  -Keep hemoglobin > 7  -Strict ins and outs  -Avoid nephrotoxic agents if possible and renally dose medications  -Avoid drastic hemodynamic changes if possible    Hyponatremia - likely due to KYA, continue to monitor  Metabolic acidosis - due to KYA, continue to monitor. If continues to decrease tomorrow AM please obtain an ABG to confirm acidosis, hesitant to start IV bicarb unless absolutely necessary

## 2025-02-18 NOTE — PROGRESS NOTES
[facility] Medicine  Progress Note    Patient Name: Lorena Contreras  MRN: [id number]  Patient Class: IP- Inpatient   Admission Date: 2/15/2025  Length of Stay: 3 days  Attending Physician: Singh Barbour DO  Primary Care Provider: Jorge Paris MD        Subjective     Principal Problem:Complete heart block        HPI:  A pleasant  74 yo F with PMHx of  DM2, Fibromyalgia, lymphoma s/p chemo, HTN, HLD, CVA, MI, CAD s/p PCIs  CKD3b, HFpEF, and anemia who presented to the ED with a chief complaint of chest pain radiating to the back with onset a day before presentation.    Pain was said to be sudden in onset; no associated nausea or diaphoresis.  No Preceding PND orthopnea or leg swelling; pain was described as different from her previous coronary events.  Patient however endorsed dizziness but this has been going on for some time.  She denied any syncopal event  Symptoms progress and this prompted patient to come to the ED    Retrospective chart review indicates multiple ED visits for observation for atypical chest pain.  Cardiac history is significant for CAD with JESIKA x 2 to RCA 3/29/19 - JESIKA to RI and Cx 1/8/20      On presentation to the ED; patient was bradycardic to 32 BPM which subsequently improved and mildly hypertensive.  EKG showed complete heart block.  Patient was admitted to the ICU for further management.    Overview/Hospital Course:  73-year-old female with history of diabetes, anxiety,HTN, HLD, CVA, MI, CAD s/p PCIs  CKD3b, HFpEF, and anemia admitted to ICU on 02/15/2025 for complete heart block and KYA.  Cardiology consulted- patient s/p ppm on 02/17.  Patient to start aspirin and Brilinta on 02/18.  Nephrology consulted for KYA on CKD.    Interval History:  No acute overnight events.  Patient remained afebrile.  Patient is very anxious on exam.  Complains of neck pain, but no numbness or tingling.  States neck pain has been chronic.  Denies any chest pain or shortness  for breath.  States she is feeling anxious and gets nauseous with her anxiety.  Able to tolerate p.o. intake.    Review of Systems   Constitutional:  Negative for fatigue and fever.   Eyes:  Negative for visual disturbance.   Respiratory:  Negative for cough, chest tightness and shortness of breath.    Cardiovascular:  Negative for chest pain and palpitations.   Gastrointestinal:  Positive for nausea. Negative for abdominal pain and vomiting.   Genitourinary:  Negative for difficulty urinating, dysuria, frequency and urgency.   Musculoskeletal:  Positive for neck pain.   Neurological:  Negative for dizziness and headaches.   Psychiatric/Behavioral:  Negative for confusion. The patient is nervous/anxious.      Objective:     Vital Signs (Most Recent):  Temp: 97.2 °F (36.2 °C) (02/18/25 1054)  Pulse: 95 (02/18/25 1054)  Resp: 18 (02/18/25 1054)  BP: 120/71 (02/18/25 1054)  SpO2: 95 % (02/18/25 1054) Vital Signs (24h Range):  Temp:  [97.2 °F (36.2 °C)-98.5 °F (36.9 °C)] 97.2 °F (36.2 °C)  Pulse:  [] 95  Resp:  [15-27] 18  SpO2:  [95 %-99 %] 95 %  BP: (109-130)/(51-71) 120/71     Weight: 85.9 kg (189 lb 6 oz)  Body mass index is 27.97 kg/m².    Intake/Output Summary (Last 24 hours) at 2/18/2025 1415  Last data filed at 2/18/2025 1026  Gross per 24 hour   Intake 120 ml   Output 650 ml   Net -530 ml         Physical Exam  Vitals and nursing note reviewed.   Constitutional:       General: She is not in acute distress.     Appearance: She is not ill-appearing.   HENT:      Mouth/Throat:      Mouth: Mucous membranes are moist.   Cardiovascular:      Rate and Rhythm: Normal rate and regular rhythm.      Pulses: Normal pulses.   Pulmonary:      Effort: Pulmonary effort is normal. No respiratory distress.      Breath sounds: Normal breath sounds. No wheezing or rales.      Comments: On room air.  Clean surgical dressing on left anterior chest wall  Abdominal:      General: There is no distension.      Palpations: Abdomen  is soft.      Tenderness: There is no abdominal tenderness.   Musculoskeletal:      Right lower leg: No edema.      Left lower leg: No edema.   Neurological:      General: No focal deficit present.      Mental Status: She is alert and oriented to person, place, and time.   Psychiatric:         Mood and Affect: Mood is anxious.      Comments: Patient very anxious on exam             Significant Labs: All pertinent labs within the past 24 hours have been reviewed.    Significant Imaging: I have reviewed all pertinent imaging results/findings within the past 24 hours.    Assessment and Plan     * Complete heart block  Hx of chest pain on presentation; different from previous anginal pains  EKG showed complete heart block  Troponin not elevated  Discontinue all AV frances blocking agents  Monitor patient on telemetry  S/p Medtronic PPM on 02/17  Continue Keflex; arm sling per Cardiology  Restart aspirin and Brilinta 02/18/25      Cardiac pacemaker in situ  -s/p ppm on 02/17.    Acute kidney injury superimposed on stage 3b chronic kidney disease  KYA is likely due to pre-renal azotemia due to intravascular volume depletion. Baseline creatinine is  1.6 to 2.2 . Most recent creatinine and eGFR are listed below.  Recent Labs     02/16/25  1114 02/17/25  1200 02/18/25  0726   CREATININE 3.8* 4.1* 4.1*   EGFRNORACEVR 12* 11* 11*        Plan  - KYA is worsening. Will adjust treatment as follows:  IVF  - Avoid nephrotoxins and renally dose meds for GFR listed above  - Monitor urine output, serial BMP, and adjust therapy as needed  - nephrology consulted; input appreciated    Coronary artery disease of native artery of native heart with stable angina pectoris  Patient with known CAD s/p stent placement and CABG, which is controlled Will continue Statin; hold aspirin and Plavix setting of impending pacing and monitor for S/Sx of angina/ACS. Continue to monitor on telemetry.     -repeat ischemic evaluation of after PPM; defer timing  to cardiology  -cardiology follow-up outpatient    Aortic stenosis  Echocardiogram with evidence of aortic stenosis that is moderate . The patient's most recent echocardiogram result is listed below. We will manage the valvular abnormality by avoiding volume overload; and cardiology follow up as an outpatient    Echo  Result Date: 2/15/2025    Left Ventricle: The left ventricle is normal in size. There is moderate   concentric hypertrophy. There is hyperdynamic systolic function with a   visually estimated ejection fraction of 70 - 75%.    Right Ventricle: Normal right ventricular cavity size. Systolic   function is normal.    Left Atrium: Left atrium is moderately dilated.    Right Atrium: Right atrium is mildly dilated.    Aortic Valve: There is moderate stenosis. Aortic valve area by VTI is   0.8 cm². Aortic valve peak velocity is 3.4 m/s. Mean gradient is 27 mmHg.   The dimensionless index is 0.27.    Mitral Valve: There is mild stenosis. The mean pressure gradient across   the mitral valve is 5 mmHg at a heart rate of 42  bpm. There is mild   regurgitation.    Tricuspid Valve: There is moderate regurgitation.    Pulmonary Artery: The estimated pulmonary artery systolic pressure is   49 mmHg.    IVC/SVC: Intermediate venous pressure at 8 mmHg.        Echo  Result Date: 10/31/2024    Left Ventricle: The left ventricle is normal in size. Normal wall   thickness. Normal wall motion. There is normal systolic function with a   visually estimated ejection fraction of 60 - 65%. Grade II diastolic   dysfunction. Elevated left ventricular filling pressure.    Right Ventricle: Normal right ventricular cavity size. Wall thickness   is normal. Systolic function is normal.    Left Atrium: Left atrium is severely dilated.    Aortic Valve: There is moderate aortic valve sclerosis. Moderately   restricted motion. There is moderate stenosis. Aortic valve area by VTI is   1.1 cm2. Aortic valve peak velocity is 3.1 m/s. Mean  gradient is 20.1   mmHg. The dimensionless index is 0.37.    Mitral Valve: There is mild regurgitation.    Tricuspid Valve: There is mild regurgitation.    Pulmonary Artery: There is pulmonary hypertension. The estimated   pulmonary artery systolic pressure is 46 mmHg.    IVC/SVC: Normal venous pressure at 3 mmHg.           Mixed hyperlipidemia  Continue home statin      Diabetes mellitus  Patient's FSGs are controlled on current medication regimen.  Last A1c reviewed-   Lab Results   Component Value Date    HGBA1C 5.9 (H) 01/14/2025     Most recent fingerstick glucose reviewed-   Recent Labs   Lab 02/17/25  1724 02/17/25  1947 02/18/25  0720 02/18/25  1055   POCTGLUCOSE 309* 252* 300* 420*       Current correctional scale  Medium  Maintain anti-hyperglycemic dose as follows-   Antihyperglycemics (From admission, onward)      Start     Stop Route Frequency Ordered    02/17/25 2100  insulin glargine U-100 (Lantus) pen 10 Units         -- SubQ Nightly 02/17/25 1349    02/16/25 1645  insulin aspart U-100 pen 5 Units         -- SubQ 3 times daily with meals 02/16/25 1146    02/15/25 1358  insulin aspart U-100 pen 0-10 Units         -- SubQ Before meals & nightly PRN 02/15/25 1258          Hold Oral hypoglycemics while patient is in the hospital.; cardiac consistent carbohydrate diet; monitor blood glucose log and titrate to effect  Prandial aspart added    Hyponatremia  Hyponatremia is likely due to renal insufficiency. The patient's most recent sodium results are listed below.  Recent Labs     02/16/25  1114 02/17/25  1200 02/18/25  0726   * 133* 133*     Plan  - nephrology consulted  - Monitor sodium Daily.   - Patient hyponatremia is stable  - monitor closely    Anxiety  -hydroxyzine p.r.n. every 8 hours for anxiety      Stage 3b chronic kidney disease  Creatine stable for now. BMP reviewed- noted Estimated Creatinine Clearance: 15.2 mL/min (A) (based on SCr of 3.8 mg/dL (H)). according to latest data. Based on  current GFR, CKD stage is stage 4 - GFR 15-29.  Monitor UOP and serial BMP and adjust therapy as needed. Renally dose meds. Avoid nephrotoxic medications and procedures.      VTE Risk Mitigation (From admission, onward)           Ordered     IP VTE HIGH RISK PATIENT  Once         02/15/25 1252     Place sequential compression device  Until discontinued         02/15/25 1252     Place sequential compression device  Until discontinued         02/15/25 0802                    Discharge Planning   MIKHAIL: 2/21/2025     Code Status: Full Code   Medical Readiness for Discharge Date:   Discharge Plan A: Home                Please place Justification for CONSTANTINO Barbour DO  Department of Hospital Medicine   [facility]

## 2025-02-18 NOTE — PROGRESS NOTES
[facility]  Nephrology  Progress Note    Patient Name: Lorena Contreras  MRN: [id number]  Admission Date: 2/15/2025  Hospital Length of Stay: 3 days  Attending Provider: Singh Barbour DO   Primary Care Physician: Jorge Paris MD  Principal Problem:Complete heart block    Subjective:     HPI: 72 yo F with PMHx of DM2, Fibromyalgia, lymphoma s/p chemo, HTN, HLD, CVA, MI, CAD s/p PCIs CKD3b, HFpEF, and anemia presented to  ED via EMS c/o chest pain radiating to the back x 1 day. Vitals on arrival HR 45bpm. Labs on arrival  Cr 3.5, BNP 1115, EKG complet hear block. CXR normal. Pt given asprin and nitro by ems prior to arrival.     Nephrology consulted for KYA on CKD 3b    Prior records obtained and reviewed. Baseline Cr 2, last at baseline 1/14/25. Cr on arrival 3.5. Pt state she does not have a nephrologist. Pt states she was doing well prior to yesterday. She denies nsaid use, change in uop, n/v.     Interval History: no acute events overnight    Review of patient's allergies indicates:   Allergen Reactions    Novolin 70/30 (semi-synthetic) Nausea And Vomiting     Severe vomiting on Relion 70/30    Sulfa (sulfonamide antibiotics) Anaphylaxis    Talwin [pentazocine lactate] Anaphylaxis    Victoza [liraglutide] Nausea And Vomiting    Glipizide Nausea Only    Codeine     Influenza virus vaccines Hives    Iodine and iodide containing products Hives    Levetiracetam Itching    Lyrica [pregabalin] Hallucinations    Neurontin [gabapentin]      Possible associated myoclonic jerk    Rituxan [rituximab] Hives    Zoloft [sertraline] Nausea And Vomiting     Current Facility-Administered Medications   Medication Frequency    0.9% NaCl infusion Continuous    acetaminophen tablet 650 mg Q4H PRN    albuterol-ipratropium 2.5 mg-0.5 mg/3 mL nebulizer solution 3 mL Q4H PRN    aspirin EC tablet 81 mg Daily    atorvastatin tablet 80 mg QHS    cephALEXin capsule 500 mg Q8H    dextrose 50% injection 12.5 g PRN     dextrose 50% injection 25 g PRN    FLUoxetine capsule 40 mg Daily    furosemide tablet 40 mg BID    glucagon (human recombinant) injection 1 mg PRN    glucose chewable tablet 16 g PRN    glucose chewable tablet 24 g PRN    hydrALAZINE injection 10 mg Q4H PRN    hydrALAZINE tablet 100 mg Q8H    HYDROmorphone (PF) injection 1 mg Q6H PRN    hydrOXYzine pamoate capsule 50 mg Q8H PRN    insulin aspart U-100 pen 0-10 Units QID (AC + HS) PRN    insulin aspart U-100 pen 8 Units TIDWM    insulin glargine U-100 (Lantus) pen 13 Units QHS    isosorbide mononitrate 24 hr tablet 60 mg Daily    LORazepam tablet 1 mg Daily PRN    melatonin tablet 6 mg Nightly PRN    mupirocin 2 % ointment BID    ondansetron injection 4 mg Q8H PRN    oxyCODONE immediate release tablet 10 mg Q4H PRN    pantoprazole EC tablet 40 mg Daily    pneumoc 20-michael conj-dip cr(PF) (PREVNAR-20 (PF)) injection Syrg 0.5 mL vaccine x 1 dose    simethicone chewable tablet 80 mg TID PRN    sodium chloride 0.9% flush 10 mL PRN    sodium chloride 0.9% flush 10 mL PRN    ticagrelor tablet 60 mg BID       Objective:     Vital Signs (Most Recent):  Temp: 97.2 °F (36.2 °C) (02/18/25 1054)  Pulse: 95 (02/18/25 1451)  Resp: 18 (02/18/25 1442)  BP: 120/71 (02/18/25 1054)  SpO2: 95 % (02/18/25 1054) Vital Signs (24h Range):  Temp:  [97.2 °F (36.2 °C)-98.5 °F (36.9 °C)] 97.2 °F (36.2 °C)  Pulse:  [] 95  Resp:  [15-27] 18  SpO2:  [95 %-99 %] 95 %  BP: (109-130)/(53-71) 120/71     Weight: 85.9 kg (189 lb 6 oz) (02/17/25 1616)  Body mass index is 27.97 kg/m².  Body surface area is 2.04 meters squared.    I/O last 3 completed shifts:  In: 692.7 [P.O.:250; I.V.:442.7]  Out: 1050 [Urine:1050]     Physical Exam  Constitutional:       General: She is not in acute distress.     Appearance: She is not ill-appearing or toxic-appearing.   HENT:      Nose: Nose normal. No congestion.      Mouth/Throat:      Mouth: Mucous membranes are moist.   Eyes:      Pupils: Pupils are equal,  round, and reactive to light.   Cardiovascular:      Rate and Rhythm: Tachycardia present.      Heart sounds: Murmur heard.      Comments: Paced   Skin:     Findings: Bruising present.   Neurological:      Mental Status: She is alert.          Significant Labs:  All labs within the past 24 hours have been reviewed.     Significant Imaging:  Labs: Reviewed  Assessment/Plan:     Renal/  Acute kidney injury superimposed on stage 3b chronic kidney disease  Metabolic acidosis  Baseline creatinine 2.0  On presentation creatinine 3.5    KYA due to cardiogenic shock secondary to complete heart block  2/17 pacemaker placed  KYA stable    Plan/Recommendation  No acute indication for dialysis, IVF or diuretics at this time, hopeful that restoration of cardiac function after pacemaker provides adequate CO for improvement of creatinine.  We discussed the possibility of dialysis and that we are hopeful she will not need it but may for a short time.  Continue to monitor kidney function and UOP - suspect KYA cresting, hopeful for improvement in the next few days  Give 3 amp sodium bicarb for metabolic acidosis  -Keep MAP > 65  -Keep hemoglobin > 7  -Strict ins and outs  -Avoid nephrotoxic agents if possible and renally dose medications  -Avoid drastic hemodynamic changes if possible    Hyponatremia - likely due to KYA, continue to monitor            Thank you for your consult. I will follow-up with patient. Please contact us if you have any additional questions.    Alex Castañeda MD  Nephrology  [facility] - Med Surg

## 2025-02-18 NOTE — PROGRESS NOTES
[facility]  Cardiology  Progress Note    Patient Name: Lorena Contreras  MRN: [id number]  Admission Date: 2/15/2025  Hospital Length of Stay: 3 days  Code Status: Full Code   Attending Physician: Singh Barbour DO   Primary Care Physician: Jorge Paris MD  Expected Discharge Date:   Principal Problem:Complete heart block    Subjective:     Hospital Course:   2/16/25 C/O neck pain and poor appetite. HR 40 CHB. Agreeable to PPM tomorrow  2/18/25 N/V overnight. Feels a little better this AM. A-sensed V-paced 90s. Cr 4.1. PPM dressing changed - site C/D/I      2/17/25 Medtronic dual pacemaker placed left SC vein     Echo 2/15/25    Left Ventricle: The left ventricle is normal in size. There is moderate concentric hypertrophy. There is hyperdynamic systolic function with a visually estimated ejection fraction of 70 - 75%.    Right Ventricle: Normal right ventricular cavity size. Systolic function is normal.    Left Atrium: Left atrium is moderately dilated.    Right Atrium: Right atrium is mildly dilated.    Aortic Valve: There is moderate stenosis. Aortic valve area by VTI is 0.8 cm². Aortic valve peak velocity is 3.4 m/s. Mean gradient is 27 mmHg. The dimensionless index is 0.27.    Mitral Valve: There is mild stenosis. The mean pressure gradient across the mitral valve is 5 mmHg at a heart rate of 42  bpm. There is mild regurgitation.    Tricuspid Valve: There is moderate regurgitation.    Pulmonary Artery: The estimated pulmonary artery systolic pressure is 49 mmHg.    IVC/SVC: Intermediate venous pressure at 8 mmHg.    Interval History:     Review of Systems   Constitutional: Negative for decreased appetite.   HENT:  Negative for ear discharge.    Eyes:  Negative for blurred vision.   Respiratory:  Negative for hemoptysis.    Endocrine: Negative for polyphagia.   Hematologic/Lymphatic: Negative for adenopathy.   Skin:  Negative for color change.   Musculoskeletal:  Negative for joint swelling.    Genitourinary:  Negative for bladder incontinence.   Neurological:  Negative for brief paralysis.   Psychiatric/Behavioral:  Negative for hallucinations.    Allergic/Immunologic: Negative for hives.     Objective:     Vital Signs (Most Recent):  Temp: 97.9 °F (36.6 °C) (02/18/25 0726)  Pulse: 94 (02/18/25 0808)  Resp: 18 (02/18/25 0808)  BP: (!) 109/58 (02/18/25 0726)  SpO2: 96 % (02/18/25 0808) Vital Signs (24h Range):  Temp:  [97.4 °F (36.3 °C)-98.5 °F (36.9 °C)] 97.9 °F (36.6 °C)  Pulse:  [] 94  Resp:  [12-34] 18  SpO2:  [95 %-100 %] 96 %  BP: (109-133)/(51-60) 109/58     Weight: 85.9 kg (189 lb 6 oz)  Body mass index is 27.97 kg/m².     SpO2: 96 %         Intake/Output Summary (Last 24 hours) at 2/18/2025 0950  Last data filed at 2/17/2025 1800  Gross per 24 hour   Intake 250 ml   Output 650 ml   Net -400 ml       Lines/Drains/Airways       Drain  Duration             Female External Urinary Catheter w/ Suction 02/15/25 0011 3 days              Peripheral Intravenous Line  Duration                  Peripheral IV - Single Lumen 02/15/25 0610 20 G Anterior;Right Forearm 3 days         Peripheral IV - Single Lumen 02/15/25 0843 20 G Left Antecubital 3 days                       Physical Exam  Constitutional:       Appearance: She is well-developed.   HENT:      Head: Normocephalic and atraumatic.   Eyes:      Conjunctiva/sclera: Conjunctivae normal.      Pupils: Pupils are equal, round, and reactive to light.   Cardiovascular:      Rate and Rhythm: Normal rate.      Pulses: Intact distal pulses.      Heart sounds: Normal heart sounds.   Pulmonary:      Effort: Pulmonary effort is normal.      Breath sounds: Normal breath sounds.   Abdominal:      General: Bowel sounds are normal.      Palpations: Abdomen is soft.   Musculoskeletal:         General: Normal range of motion.      Cervical back: Normal range of motion and neck supple.   Skin:     General: Skin is warm and dry.   Neurological:      Mental  Status: She is alert and oriented to person, place, and time.            Significant Labs: All pertinent lab results from the last 24 hours have been reviewed.    Significant Imaging: Echocardiogram: 2D echo with color flow doppler: No results found. However, due to the size of the patient record, not all encounters were searched. Please check Results Review for a complete set of results.  Assessment and Plan:     Brief HPI:     Cardiac pacemaker in situ  Dressing changed - site C/D/I. A-sensed V-paced. Would hydrate today with IVF given N/V and rsising Cr. Ok for d/c in AM if stable. OV with me 2 weeks for staple removal. Resume ASA/brilinta. Rx for pain medication and Abx sent to pharmacy    CHB (complete heart block)  New Dx. Hold rate lowering medications. Hold ASA/brilinta. BP adequate no need for TVP at  this point. Will plan PPM Monday if CHB persists. EF normal on echo    2/16/25 Still with CHB. Dual PPM tomorrow - risks/benefits explained and she agrees to proceed    Aortic stenosis  AS moderate    Echo  Result Date: 2/15/2025    Left Ventricle: The left ventricle is normal in size. There is moderate   concentric hypertrophy. There is hyperdynamic systolic function with a   visually estimated ejection fraction of 70 - 75%.    Right Ventricle: Normal right ventricular cavity size. Systolic   function is normal.    Left Atrium: Left atrium is moderately dilated.    Right Atrium: Right atrium is mildly dilated.    Aortic Valve: There is moderate stenosis. Aortic valve area by VTI is   0.8 cm². Aortic valve peak velocity is 3.4 m/s. Mean gradient is 27 mmHg.   The dimensionless index is 0.27.    Mitral Valve: There is mild stenosis. The mean pressure gradient across   the mitral valve is 5 mmHg at a heart rate of 42  bpm. There is mild   regurgitation.    Tricuspid Valve: There is moderate regurgitation.    Pulmonary Artery: The estimated pulmonary artery systolic pressure is   49 mmHg.    IVC/SVC: Intermediate  venous pressure at 8 mmHg.        Echo  Result Date: 10/31/2024    Left Ventricle: The left ventricle is normal in size. Normal wall   thickness. Normal wall motion. There is normal systolic function with a   visually estimated ejection fraction of 60 - 65%. Grade II diastolic   dysfunction. Elevated left ventricular filling pressure.    Right Ventricle: Normal right ventricular cavity size. Wall thickness   is normal. Systolic function is normal.    Left Atrium: Left atrium is severely dilated.    Aortic Valve: There is moderate aortic valve sclerosis. Moderately   restricted motion. There is moderate stenosis. Aortic valve area by VTI is   1.1 cm2. Aortic valve peak velocity is 3.1 m/s. Mean gradient is 20.1   mmHg. The dimensionless index is 0.37.    Mitral Valve: There is mild regurgitation.    Tricuspid Valve: There is mild regurgitation.    Pulmonary Artery: There is pulmonary hypertension. The estimated   pulmonary artery systolic pressure is 46 mmHg.    IVC/SVC: Normal venous pressure at 3 mmHg.           Coronary artery disease of native artery of native heart with stable angina pectoris  Hx multivessel CAD. Suspect CP is demand ischemia from CHB over ACS.High risk for LHC with Cr 3.5. Will plan repeat ischemic evlaution after PPM if still symptomatic. EF normal on echo    Mixed hyperlipidemia  On statin          VTE Risk Mitigation (From admission, onward)           Ordered     IP VTE HIGH RISK PATIENT  Once         02/15/25 1252     Place sequential compression device  Until discontinued         02/15/25 1252     Place sequential compression device  Until discontinued         02/15/25 0802                    Karl Rico MD  Cardiology  [facility]

## 2025-02-18 NOTE — ASSESSMENT & PLAN NOTE
Hyponatremia is likely due to renal insufficiency. The patient's most recent sodium results are listed below.  Recent Labs     02/16/25  1114 02/17/25  1200 02/18/25  0726   * 133* 133*     Plan  - nephrology consulted  - Monitor sodium Daily.   - Patient hyponatremia is stable  - monitor closely

## 2025-02-18 NOTE — NURSING
"Pt reported that she takes Lorazepam at home for anxiety. MD notified of pt report of taking Lorazepam at home and that pt still c/o pain after PRN administration of Norco 10mg. Pt also reported nausea and began to dry heave into emesis bag. MD notified to switch pt PO Ativan to IV and to give 1x dose of IV pain medicine d/t patient nausea. Pt stated, "I can't deal with this pain." Pt administered 1 mg of Dilaudid IV, Zofran 4 mg IV, and Ativan 1 mg IV.   "

## 2025-02-18 NOTE — NURSING
Pt arrived to unit from ICU via w/c. AAOx4, Resp even and unlabored. Pacemaker to LCW w/drsg CDI. Left arm in sling for stabilization. Pt oriented to room and call light. SR up x 2. Bed in lowest position. Call light in reach. Plan of care ongoing.

## 2025-02-18 NOTE — ASSESSMENT & PLAN NOTE
Echocardiogram with evidence of aortic stenosis that is moderate . The patient's most recent echocardiogram result is listed below. We will manage the valvular abnormality by avoiding volume overload; and cardiology follow up as an outpatient    Echo  Result Date: 2/15/2025    Left Ventricle: The left ventricle is normal in size. There is moderate   concentric hypertrophy. There is hyperdynamic systolic function with a   visually estimated ejection fraction of 70 - 75%.    Right Ventricle: Normal right ventricular cavity size. Systolic   function is normal.    Left Atrium: Left atrium is moderately dilated.    Right Atrium: Right atrium is mildly dilated.    Aortic Valve: There is moderate stenosis. Aortic valve area by VTI is   0.8 cm². Aortic valve peak velocity is 3.4 m/s. Mean gradient is 27 mmHg.   The dimensionless index is 0.27.    Mitral Valve: There is mild stenosis. The mean pressure gradient across   the mitral valve is 5 mmHg at a heart rate of 42  bpm. There is mild   regurgitation.    Tricuspid Valve: There is moderate regurgitation.    Pulmonary Artery: The estimated pulmonary artery systolic pressure is   49 mmHg.    IVC/SVC: Intermediate venous pressure at 8 mmHg.        Echo  Result Date: 10/31/2024    Left Ventricle: The left ventricle is normal in size. Normal wall   thickness. Normal wall motion. There is normal systolic function with a   visually estimated ejection fraction of 60 - 65%. Grade II diastolic   dysfunction. Elevated left ventricular filling pressure.    Right Ventricle: Normal right ventricular cavity size. Wall thickness   is normal. Systolic function is normal.    Left Atrium: Left atrium is severely dilated.    Aortic Valve: There is moderate aortic valve sclerosis. Moderately   restricted motion. There is moderate stenosis. Aortic valve area by VTI is   1.1 cm2. Aortic valve peak velocity is 3.1 m/s. Mean gradient is 20.1   mmHg. The dimensionless index is 0.37.     Mitral Valve: There is mild regurgitation.    Tricuspid Valve: There is mild regurgitation.    Pulmonary Artery: There is pulmonary hypertension. The estimated   pulmonary artery systolic pressure is 46 mmHg.    IVC/SVC: Normal venous pressure at 3 mmHg.

## 2025-02-18 NOTE — NURSING
Pt currently resting comfortably, no c/o pain or anxiety. Bed in lowest position, wheels locked, HOB elevated, call light within reach.

## 2025-02-18 NOTE — ASSESSMENT & PLAN NOTE
Patient's FSGs are controlled on current medication regimen.  Last A1c reviewed-   Lab Results   Component Value Date    HGBA1C 5.9 (H) 01/14/2025     Most recent fingerstick glucose reviewed-   Recent Labs   Lab 02/17/25  1724 02/17/25  1947 02/18/25  0720 02/18/25  1055   POCTGLUCOSE 309* 252* 300* 420*       Current correctional scale  Medium  Maintain anti-hyperglycemic dose as follows-   Antihyperglycemics (From admission, onward)    Start     Stop Route Frequency Ordered    02/17/25 2100  insulin glargine U-100 (Lantus) pen 10 Units         -- SubQ Nightly 02/17/25 1349    02/16/25 1645  insulin aspart U-100 pen 5 Units         -- SubQ 3 times daily with meals 02/16/25 1146    02/15/25 1358  insulin aspart U-100 pen 0-10 Units         -- SubQ Before meals & nightly PRN 02/15/25 1258        Hold Oral hypoglycemics while patient is in the hospital.; cardiac consistent carbohydrate diet; monitor blood glucose log and titrate to effect  Prandial aspart added

## 2025-02-18 NOTE — CLINICAL REVIEW
Sepsis Screen (most recent)       Sepsis Screen (IP) - 02/18/25 0237       Is the patient's history or complaint suggestive of a possible infection? Yes  -    Are there at least two of the following signs and symptoms present? Yes  -    Sepsis signs/symptoms - Tachycardia Tachycardia     >90  -    Sepsis signs/symptoms - WBC WBC < 4,000 or WBC > 12,000  -    Are any of the following organ dysfunction criteria present and not considered to be due to a chronic condition? Yes   KYA on CKD -    Organ Dysfunction Criteria Creatinine > 2.0  -    Initiate Sepsis Protocol No  -    Reason sepsis not considered Pt. receiving appropriate management  -              User Key  (r) = Recorded By, (t) = Taken By, (c) = Cosigned By      Initials Name    Ruth Palacios RN

## 2025-02-18 NOTE — PT/OT/SLP EVAL
"Physical Therapy Evaluation    Patient Name:  Lorena Contreras   MRN:  [id number]    Recommendations:     Discharge Recommendations: Low Intensity Therapy (Resume Outpatient PT)   Discharge Equipment Recommendations: none   Barriers to discharge:  None from PT    Assessment:     Lorena Contreras is a 74 y.o. female admitted with a medical diagnosis of Complete heart block.  She presents with the following impairments/functional limitations: weakness, gait instability, decreased ROM, impaired endurance, impaired balance, impaired coordination, decreased safety awareness, pain, impaired self care skills, impaired functional mobility, decreased coordination, decreased lower extremity function .    Rehab Prognosis: Good; patient would benefit from acute skilled PT services to address these deficits and reach maximum level of function.    Recent Surgery: Procedure(s) (LRB):  INSERTION, CARDIAC PACEMAKER, DUAL CHAMBER (N/A) 1 Day Post-Op    Plan:     During this hospitalization, patient to be seen  (3-5x/wk) to address the identified rehab impairments via gait training, therapeutic activities, therapeutic exercises, neuromuscular re-education and progress toward the following goals:    Plan of Care Expires:  02/25/25    Subjective     Chief Complaint: pain, fatigue, hasn't slept in 2 days  Patient/Family Comments/goals: Pt agreeable to evaluation with education and encouragement  Pain/Comfort:  Pain Rating 1:  (Not rated, Pacemaker site)  Pain Addressed 1: Reposition, Distraction, Cessation of Activity, Nurse notified    Patients cultural, spiritual, Advent conflicts given the current situation: no    Living Environment:  Pt lives with her Grandson in a [facility] with 4STE, B HR's  Prior to admission, patients level of function was Modified Independent "furniture surfing" for  ambulation and W/C for community. Pt was attending Outpatient PT Equipment used at home: walker, rolling, wheelchair, rollator, bath bench, " "bedside commode.  DME owned (not currently used): none.  Upon discharge, patient will have assistance from [facility].    Objective:     Communicated with nsg prior to session.  Patient found HOB elevated with bed alarm, PureWick  upon PT entry to room.    General Precautions: Standard, fall, pacemaker  Orthopedic Precautions:N/A   Braces: N/A  Respiratory Status: Room air    Exams:  Cognitive Exam:  Patient is oriented to Person, Place, Time, and Situation  Gross Motor Coordination:  WFL  Postural Exam:  Patient presented with the following abnormalities:    -       Rounded shoulders  -       Forward head  Sensation:    -       Intact  light/touch B LE's  Skin Integrity/Edema:      -       Skin integrity: Visible skin intact  -       Edema: None noted    RLE ROM: Dflx PROM -30*  RLE Strength: Knee ext 5/5  LLE ROM: WFL  LLE Strength: WFL    Functional Mobility:  Bed Mobility:     Scooting: supervision  Supine to Sit: contact guard assistance  Sit to Supine: stand by assistance  Transfers:     Sit to Stand:  minimum assistance and with VC/TC for hand placement and forward weight shift over ALIN with rolling walker  Gait: Gait training with RW and CGA approx 18' with VC/TC for walker management/safety and increased trunk ext.  Pt ambulates with increased knee flexion R  Balance: Fair+ sit, fair+/fair stand      AM-PAC 6 CLICK MOBILITY  Total Score:17       Treatment & Education:  Educated pt on importance of OOB to chair, Educated pt on Pacemaker precautions, educated pt on PT POC, to "call before you fall", and to perform AP's, GS, and QS while in bed throughout the day.    Patient left HOB elevated with all lines intact, call button in reach, bed alarm on, and nsg notified.    GOALS:   Multidisciplinary Problems       Physical Therapy Goals          Problem: Physical Therapy    Goal Priority Disciplines Outcome Interventions   Physical Therapy Goal     PT, PT/OT Progressing    Description: Goals to be met by: 2/25/25 "     Patient will increase functional independence with mobility by performin. Supine to sit with Stand-by Assistance  2. Sit to stand transfer with Stand-by Assistance  3. Gait  x 25-50 feet with Stand-by Assistance using Rolling Walker.   4. Ascend/descend 4 stair with right Handrails and Minimal  Assistance .   5. Lower extremity exercise program x10 reps per handout, with supervision                         DME Justifications:  No DME recommended requiring DME justifications    History:     Past Medical History:   Diagnosis Date    Age-related osteoporosis with current pathological fracture with routine healing 2024    Allergy     Altered mental status 2022    DYSARTHRIA, SPASTIC MOVEMENTS & DIFFICULTY SWALLOWING    Anemia     Anxiety     Arthritis     Cataract     both removed    Colon polyps     Coronary artery disease     Depression     Diabetes mellitus, type II     Disorder of kidney and ureter     Epilepsia partialis continua 2023    Fibromyalgia     Follicular lymphoma     GERD (gastroesophageal reflux disease)     HTN (hypertension)     Hyperlipidemia     MI (myocardial infarction) 2019    Personal history of colonic polyps     Restless leg syndrome     Stroke        Past Surgical History:   Procedure Laterality Date    A-V CARDIAC PACEMAKER INSERTION N/A 2025    Procedure: INSERTION, CARDIAC PACEMAKER, DUAL CHAMBER;  Surgeon: Karl Rico MD;  Location: [facility] CATH LAB;  Service: Cardiology;  Laterality: N/A;    APPLICATION OF WOUND VACUUM-ASSISTED CLOSURE DEVICE Right 2024    Procedure: APPLICATION, WOUND VAC;  Surgeon: Neto Davalos MD;  [facility] [facility] [place] [address] [place] [address];  Service: Orthopedics;  Laterality: Right;    COLONOSCOPY  2012    Colon polyp found; repeat in 5 years    COLONOSCOPY N/A 2024    Procedure: COLONOSCOPY;  Surgeon: David Castaneda MD;  [facility] [facility] ([address] [place] [address]);  Service: Endoscopy;  Laterality: N/A;    DEBRIDEMENT OF LOCAL  FLAP Right 9/25/2024    Procedure: DEBRIDEMENT, LOCAL FLAP;  Surgeon: Neto Davalos MD;  [facility];  Service: Orthopedics;  Laterality: Right;    ELBOW SURGERY Right 2015    dislocation repair     ESOPHAGOGASTRODUODENOSCOPY  11/07/2012    atrophic gastritis, H pylori testing negative    INCISION AND DRAINAGE FOOT Right 6/2/2021    Procedure: INCISION AND DRAINAGE, FOOT, bone biopsy;  Surgeon: Quiana Penn DPM;  Location: [facility];  Service: Podiatry;  Laterality: Right;    IRRIGATION AND DEBRIDEMENT OF LOWER EXTREMITY Right 9/25/2024    Procedure: IRRIGATION AND DEBRIDEMENT, LOWER EXTREMITY; slider table, supine, bone foam, cysto tubing, 6L NS/dakins/peroxide, culture swabs;  Surgeon: Neto Davalos MD;  Location: [address];  Service: Orthopedics;  Laterality: Right;    KNEE SURGERY Bilateral 2015    scoped    LEFT HEART CATHETERIZATION Left 3/29/2019    Procedure: Left heart cath;  Surgeon: Bladimir Barbosa MD;  Location: [facility] CATH LAB;  Service: Cardiology;  Laterality: Left;    LEFT HEART CATHETERIZATION Left 11/18/2019    Procedure: Left heart cath;  Surgeon: Karl Rico MD;  Location: [facility] CATH LAB;  Service: Cardiology;  Laterality: Left;    LEFT HEART CATHETERIZATION Left 1/8/2020    Procedure: Left heart cath, right radial, noon start;  Surgeon: Christos Monreal MD;  Location: [facility] CATH LAB;  Service: Cardiology;  Laterality: Left;  RN Pre Op 1-6-20.  To be admitted 1-7-20 sor Aspirin Disensitation    OPEN REDUCTION AND INTERNAL FIXATION (ORIF) OF FRACTURE OF ACETABULUM Right 8/16/2024    Procedure: ORIF, FRACTURE, ACETABULUM;  Surgeon: Neto Davalos MD;  Location: [address];  Service: Orthopedics;  Laterality: Right;  anterior and lateral pelvic incisions    OPEN REDUCTION AND INTERNAL FIXATION (ORIF) OF PILON FRACTURE Right 8/14/2024    Procedure: ORIF, FRACTURE, PILON;  Surgeon: Neto Davalos MD;  Location: [address];   Service: Orthopedics;  Laterality: Right;    TONSILLECTOMY  1955    ULTRASOUND GUIDANCE  1/8/2020    Procedure: Ultrasound Guidance;  Surgeon: Christos Monreal MD;  Location: [facility] CATH LAB;  Service: Cardiology;;       Time Tracking:     PT Received On: 02/18/25  PT Start Time: 1419     PT Stop Time: 1445  PT Total Time (min): 26 min     Billable Minutes: Evaluation 15 and Therapeutic Activity 11      02/18/2025

## 2025-02-19 LAB
ANION GAP SERPL CALC-SCNC: 13 MMOL/L (ref 8–16)
ASCENDING AORTA: 2.62 CM
AV INDEX (PROSTH): 0.33
AV MEAN GRADIENT: 24 MMHG
AV PEAK GRADIENT: 41 MMHG
AV VALVE AREA BY VELOCITY RATIO: 0.9 CM²
AV VALVE AREA: 1 CM²
AV VELOCITY RATIO: 0.28
BASOPHILS # BLD AUTO: 0.04 K/UL (ref 0–0.2)
BASOPHILS NFR BLD: 0.3 % (ref 0–1.9)
BSA FOR ECHO PROCEDURE: 2.04 M2
BUN SERPL-MCNC: 101 MG/DL (ref 8–23)
CALCIUM SERPL-MCNC: 8.6 MG/DL (ref 8.7–10.5)
CHLORIDE SERPL-SCNC: 105 MMOL/L (ref 95–110)
CO2 SERPL-SCNC: 19 MMOL/L (ref 23–29)
CREAT SERPL-MCNC: 3.8 MG/DL (ref 0.5–1.4)
CV ECHO LV RWT: 0.6 CM
DIFFERENTIAL METHOD BLD: ABNORMAL
DOP CALC AO PEAK VEL: 3.2 M/S
DOP CALC AO VTI: 57.8 CM
DOP CALC LVOT AREA: 3.1 CM2
DOP CALC LVOT DIAMETER: 2 CM
DOP CALC LVOT PEAK VEL: 0.9 M/S
DOP CALC LVOT STROKE VOLUME: 60.6 CM3
DOP CALC MV VTI: 26.1 CM
DOP CALCLVOT PEAK VEL VTI: 19.3 CM
E WAVE DECELERATION TIME: 117 MSEC
E/A RATIO: 1.21
E/E' RATIO: 28 M/S
ECHO LV POSTERIOR WALL: 1.3 CM (ref 0.6–1.1)
EOSINOPHIL # BLD AUTO: 0 K/UL (ref 0–0.5)
EOSINOPHIL NFR BLD: 0.2 % (ref 0–8)
ERYTHROCYTE [DISTWIDTH] IN BLOOD BY AUTOMATED COUNT: 15.4 % (ref 11.5–14.5)
EST. GFR  (NO RACE VARIABLE): 12 ML/MIN/1.73 M^2
FRACTIONAL SHORTENING: 32.6 % (ref 28–44)
GLUCOSE SERPL-MCNC: 95 MG/DL (ref 70–110)
HCT VFR BLD AUTO: 25.6 % (ref 37–48.5)
HGB BLD-MCNC: 8.5 G/DL (ref 12–16)
IMM GRANULOCYTES # BLD AUTO: 0.06 K/UL (ref 0–0.04)
IMM GRANULOCYTES NFR BLD AUTO: 0.5 % (ref 0–0.5)
INTERVENTRICULAR SEPTUM: 1.3 CM (ref 0.6–1.1)
IVC DIAMETER: 2.18 CM
IVRT: 74 MSEC
LA MAJOR: 6.4 CM
LA MINOR: 6.1 CM
LA WIDTH: 4.8 CM
LEFT ATRIUM SIZE: 5.1 CM
LEFT ATRIUM VOLUME INDEX: 64 ML/M2
LEFT ATRIUM VOLUME: 130 CM3
LEFT INTERNAL DIMENSION IN SYSTOLE: 2.9 CM (ref 2.1–4)
LEFT VENTRICLE DIASTOLIC VOLUME INDEX: 40.66 ML/M2
LEFT VENTRICLE DIASTOLIC VOLUME: 82.14 ML
LEFT VENTRICLE MASS INDEX: 102.9 G/M2
LEFT VENTRICLE SYSTOLIC VOLUME INDEX: 15.8 ML/M2
LEFT VENTRICLE SYSTOLIC VOLUME: 31.87 ML
LEFT VENTRICULAR INTERNAL DIMENSION IN DIASTOLE: 4.3 CM (ref 3.5–6)
LEFT VENTRICULAR MASS: 207.8 G
LV LATERAL E/E' RATIO: 33 M/S
LV SEPTAL E/E' RATIO: 23.6 M/S
LVED V (TEICH): 82.14 ML
LVES V (TEICH): 31.87 ML
LVOT MG: 2.38 MMHG
LVOT MV: 0.74 CM/S
LYMPHOCYTES # BLD AUTO: 0.6 K/UL (ref 1–4.8)
LYMPHOCYTES NFR BLD: 5.2 % (ref 18–48)
MCH RBC QN AUTO: 30.5 PG (ref 27–31)
MCHC RBC AUTO-ENTMCNC: 33.2 G/DL (ref 32–36)
MCV RBC AUTO: 92 FL (ref 82–98)
MONOCYTES # BLD AUTO: 1.1 K/UL (ref 0.3–1)
MONOCYTES NFR BLD: 8.9 % (ref 4–15)
MV MEAN GRADIENT: 12 MMHG
MV PEAK A VEL: 1.36 M/S
MV PEAK E VEL: 1.65 M/S
MV PEAK GRADIENT: 15 MMHG
MV STENOSIS PRESSURE HALF TIME: 34.04 MS
MV VALVE AREA BY CONTINUITY EQUATION: 2.32 CM2
MV VALVE AREA P 1/2 METHOD: 6.46 CM2
NEUTROPHILS # BLD AUTO: 10.4 K/UL (ref 1.8–7.7)
NEUTROPHILS NFR BLD: 84.9 % (ref 38–73)
NRBC BLD-RTO: 0 /100 WBC
OHS CV RV/LV RATIO: 0.95 CM
OHS QRS DURATION: 168 MS
OHS QRS DURATION: 168 MS
OHS QTC CALCULATION: 519 MS
OHS QTC CALCULATION: 560 MS
PISA TR MAX VEL: 4 M/S
PLATELET # BLD AUTO: 166 K/UL (ref 150–450)
PMV BLD AUTO: 12.1 FL (ref 9.2–12.9)
POCT GLUCOSE: 161 MG/DL (ref 70–110)
POCT GLUCOSE: 181 MG/DL (ref 70–110)
POCT GLUCOSE: 188 MG/DL (ref 70–110)
POCT GLUCOSE: 224 MG/DL (ref 70–110)
POTASSIUM SERPL-SCNC: 4.3 MMOL/L (ref 3.5–5.1)
PV PEAK GRADIENT: 6 MMHG
PV PEAK VELOCITY: 1.18 M/S
RA MAJOR: 6.42 CM
RA PRESSURE ESTIMATED: 8 MMHG
RA WIDTH: 3.8 CM
RBC # BLD AUTO: 2.79 M/UL (ref 4–5.4)
RIGHT VENTRICLE DIASTOLIC BASEL DIMENSION: 4.1 CM
RIGHT VENTRICULAR END-DIASTOLIC DIMENSION: 4.1 CM
RV TB RVSP: 12 MMHG
RV TISSUE DOPPLER FREE WALL SYSTOLIC VELOCITY 1 (APICAL 4 CHAMBER VIEW): 12.06 CM/S
SINUS: 3 CM
SODIUM SERPL-SCNC: 137 MMOL/L (ref 136–145)
STJ: 1.94 CM
TDI LATERAL: 0.05 M/S
TDI SEPTAL: 0.07 M/S
TDI: 0.06 M/S
TR MAX PG: 64 MMHG
TRICUSPID ANNULAR PLANE SYSTOLIC EXCURSION: 1.91 CM
TROPONIN I SERPL DL<=0.01 NG/ML-MCNC: 1.98 NG/ML (ref 0–0.03)
TROPONIN I SERPL DL<=0.01 NG/ML-MCNC: 2.35 NG/ML (ref 0–0.03)
TROPONIN I SERPL DL<=0.01 NG/ML-MCNC: 3.37 NG/ML (ref 0–0.03)
TV REST PULMONARY ARTERY PRESSURE: 72 MMHG
WBC # BLD AUTO: 12.22 K/UL (ref 3.9–12.7)
Z-SCORE OF LEFT VENTRICULAR DIMENSION IN END DIASTOLE: -3.25
Z-SCORE OF LEFT VENTRICULAR DIMENSION IN END SYSTOLE: -1.82

## 2025-02-19 PROCEDURE — 36415 COLL VENOUS BLD VENIPUNCTURE: CPT | Mod: HCNC | Performed by: STUDENT IN AN ORGANIZED HEALTH CARE EDUCATION/TRAINING PROGRAM

## 2025-02-19 PROCEDURE — 21400001 HC TELEMETRY ROOM: Mod: HCNC

## 2025-02-19 PROCEDURE — 93005 ELECTROCARDIOGRAM TRACING: CPT | Mod: HCNC

## 2025-02-19 PROCEDURE — 84484 ASSAY OF TROPONIN QUANT: CPT | Mod: HCNC | Performed by: STUDENT IN AN ORGANIZED HEALTH CARE EDUCATION/TRAINING PROGRAM

## 2025-02-19 PROCEDURE — C1887 CATHETER, GUIDING: HCPCS | Mod: HCNC | Performed by: INTERNAL MEDICINE

## 2025-02-19 PROCEDURE — 25000003 PHARM REV CODE 250: Mod: HCNC | Performed by: HOSPITALIST

## 2025-02-19 PROCEDURE — 36415 COLL VENOUS BLD VENIPUNCTURE: CPT | Mod: HCNC | Performed by: INTERNAL MEDICINE

## 2025-02-19 PROCEDURE — 63600175 PHARM REV CODE 636 W HCPCS: Mod: HCNC | Performed by: INTERNAL MEDICINE

## 2025-02-19 PROCEDURE — 94761 N-INVAS EAR/PLS OXIMETRY MLT: CPT | Mod: HCNC

## 2025-02-19 PROCEDURE — 99233 SBSQ HOSP IP/OBS HIGH 50: CPT | Mod: 25,HCNC,, | Performed by: INTERNAL MEDICINE

## 2025-02-19 PROCEDURE — 93458 L HRT ARTERY/VENTRICLE ANGIO: CPT | Mod: 26,HCNC,, | Performed by: INTERNAL MEDICINE

## 2025-02-19 PROCEDURE — 63600175 PHARM REV CODE 636 W HCPCS: Mod: HCNC | Performed by: STUDENT IN AN ORGANIZED HEALTH CARE EDUCATION/TRAINING PROGRAM

## 2025-02-19 PROCEDURE — C1769 GUIDE WIRE: HCPCS | Mod: HCNC | Performed by: INTERNAL MEDICINE

## 2025-02-19 PROCEDURE — 4A023N7 MEASUREMENT OF CARDIAC SAMPLING AND PRESSURE, LEFT HEART, PERCUTANEOUS APPROACH: ICD-10-PCS | Performed by: INTERNAL MEDICINE

## 2025-02-19 PROCEDURE — 25500020 PHARM REV CODE 255: Mod: HCNC | Performed by: INTERNAL MEDICINE

## 2025-02-19 PROCEDURE — 85025 COMPLETE CBC W/AUTO DIFF WBC: CPT | Mod: HCNC | Performed by: STUDENT IN AN ORGANIZED HEALTH CARE EDUCATION/TRAINING PROGRAM

## 2025-02-19 PROCEDURE — 93458 L HRT ARTERY/VENTRICLE ANGIO: CPT | Mod: HCNC | Performed by: INTERNAL MEDICINE

## 2025-02-19 PROCEDURE — 63600175 PHARM REV CODE 636 W HCPCS: Mod: JZ,TB,HCNC | Performed by: HOSPITALIST

## 2025-02-19 PROCEDURE — 25000003 PHARM REV CODE 250: Mod: HCNC | Performed by: INTERNAL MEDICINE

## 2025-02-19 PROCEDURE — 25000242 PHARM REV CODE 250 ALT 637 W/ HCPCS: Mod: HCNC | Performed by: INTERNAL MEDICINE

## 2025-02-19 PROCEDURE — 25000003 PHARM REV CODE 250: Mod: HCNC | Performed by: EMERGENCY MEDICINE

## 2025-02-19 PROCEDURE — B2111ZZ FLUOROSCOPY OF MULTIPLE CORONARY ARTERIES USING LOW OSMOLAR CONTRAST: ICD-10-PCS | Performed by: INTERNAL MEDICINE

## 2025-02-19 PROCEDURE — 99900035 HC TECH TIME PER 15 MIN (STAT): Mod: HCNC

## 2025-02-19 PROCEDURE — 84484 ASSAY OF TROPONIN QUANT: CPT | Mod: 91,HCNC | Performed by: INTERNAL MEDICINE

## 2025-02-19 PROCEDURE — 93010 ELECTROCARDIOGRAM REPORT: CPT | Mod: XE,HCNC,, | Performed by: INTERNAL MEDICINE

## 2025-02-19 PROCEDURE — 80048 BASIC METABOLIC PNL TOTAL CA: CPT | Mod: HCNC | Performed by: STUDENT IN AN ORGANIZED HEALTH CARE EDUCATION/TRAINING PROGRAM

## 2025-02-19 PROCEDURE — C1894 INTRO/SHEATH, NON-LASER: HCPCS | Mod: HCNC | Performed by: INTERNAL MEDICINE

## 2025-02-19 RX ORDER — NITROGLYCERIN 0.4 MG/1
0.4 TABLET SUBLINGUAL EVERY 5 MIN PRN
Status: DISCONTINUED | OUTPATIENT
Start: 2025-02-19 | End: 2025-02-25 | Stop reason: HOSPADM

## 2025-02-19 RX ORDER — DIPHENHYDRAMINE HCL 25 MG
50 CAPSULE ORAL ONCE
OUTPATIENT
Start: 2025-02-19 | End: 2025-02-19

## 2025-02-19 RX ORDER — SODIUM CHLORIDE 9 MG/ML
INJECTION, SOLUTION INTRAVENOUS CONTINUOUS
Status: ACTIVE | OUTPATIENT
Start: 2025-02-19 | End: 2025-02-19

## 2025-02-19 RX ORDER — HYDROCODONE BITARTRATE AND ACETAMINOPHEN 5; 325 MG/1; MG/1
1 TABLET ORAL EVERY 4 HOURS PRN
Refills: 0 | Status: DISCONTINUED | OUTPATIENT
Start: 2025-02-19 | End: 2025-02-25 | Stop reason: HOSPADM

## 2025-02-19 RX ORDER — SODIUM CHLORIDE 9 MG/ML
INJECTION, SOLUTION INTRAVENOUS CONTINUOUS
OUTPATIENT
Start: 2025-02-19 | End: 2025-02-19

## 2025-02-19 RX ORDER — CEFTRIAXONE 2 G/1
2 INJECTION, POWDER, FOR SOLUTION INTRAMUSCULAR; INTRAVENOUS
Status: COMPLETED | OUTPATIENT
Start: 2025-02-19 | End: 2025-02-23

## 2025-02-19 RX ORDER — INSULIN GLARGINE 100 [IU]/ML
10 INJECTION, SOLUTION SUBCUTANEOUS NIGHTLY
Status: DISCONTINUED | OUTPATIENT
Start: 2025-02-19 | End: 2025-02-21

## 2025-02-19 RX ORDER — FENTANYL CITRATE 50 UG/ML
INJECTION, SOLUTION INTRAMUSCULAR; INTRAVENOUS
Status: DISCONTINUED | OUTPATIENT
Start: 2025-02-19 | End: 2025-02-19 | Stop reason: HOSPADM

## 2025-02-19 RX ORDER — LIDOCAINE HYDROCHLORIDE 10 MG/ML
INJECTION, SOLUTION EPIDURAL; INFILTRATION; INTRACAUDAL; PERINEURAL
Status: DISCONTINUED | OUTPATIENT
Start: 2025-02-19 | End: 2025-02-19 | Stop reason: HOSPADM

## 2025-02-19 RX ORDER — METOPROLOL TARTRATE 25 MG/1
25 TABLET, FILM COATED ORAL 2 TIMES DAILY
Status: DISCONTINUED | OUTPATIENT
Start: 2025-02-19 | End: 2025-02-25 | Stop reason: HOSPADM

## 2025-02-19 RX ORDER — ONDANSETRON 8 MG/1
8 TABLET, ORALLY DISINTEGRATING ORAL EVERY 8 HOURS PRN
Status: DISCONTINUED | OUTPATIENT
Start: 2025-02-19 | End: 2025-02-25 | Stop reason: HOSPADM

## 2025-02-19 RX ORDER — ACETAMINOPHEN 325 MG/1
650 TABLET ORAL EVERY 4 HOURS PRN
Status: DISCONTINUED | OUTPATIENT
Start: 2025-02-19 | End: 2025-02-25 | Stop reason: HOSPADM

## 2025-02-19 RX ADMIN — MUPIROCIN: 20 OINTMENT TOPICAL at 08:02

## 2025-02-19 RX ADMIN — HYDRALAZINE HYDROCHLORIDE 100 MG: 25 TABLET ORAL at 05:02

## 2025-02-19 RX ADMIN — ATORVASTATIN CALCIUM 80 MG: 40 TABLET, FILM COATED ORAL at 09:02

## 2025-02-19 RX ADMIN — TICAGRELOR 60 MG: 60 TABLET ORAL at 08:02

## 2025-02-19 RX ADMIN — INSULIN GLARGINE 10 UNITS: 100 INJECTION, SOLUTION SUBCUTANEOUS at 09:02

## 2025-02-19 RX ADMIN — MUPIROCIN 1 G: 20 OINTMENT TOPICAL at 09:02

## 2025-02-19 RX ADMIN — OXYCODONE 10 MG: 5 TABLET ORAL at 01:02

## 2025-02-19 RX ADMIN — FLUOXETINE HYDROCHLORIDE 40 MG: 20 CAPSULE ORAL at 08:02

## 2025-02-19 RX ADMIN — INSULIN ASPART 2 UNITS: 100 INJECTION, SOLUTION INTRAVENOUS; SUBCUTANEOUS at 11:02

## 2025-02-19 RX ADMIN — CEFTRIAXONE 2 G: 2 INJECTION, POWDER, FOR SOLUTION INTRAMUSCULAR; INTRAVENOUS at 10:02

## 2025-02-19 RX ADMIN — SODIUM CHLORIDE: 9 INJECTION, SOLUTION INTRAVENOUS at 03:02

## 2025-02-19 RX ADMIN — TICAGRELOR 60 MG: 60 TABLET ORAL at 09:02

## 2025-02-19 RX ADMIN — HYDROCODONE BITARTRATE AND ACETAMINOPHEN 1 TABLET: 5; 325 TABLET ORAL at 01:02

## 2025-02-19 RX ADMIN — PANTOPRAZOLE SODIUM 40 MG: 40 TABLET, DELAYED RELEASE ORAL at 08:02

## 2025-02-19 RX ADMIN — LORAZEPAM 1 MG: 0.5 TABLET ORAL at 01:02

## 2025-02-19 RX ADMIN — HYDROMORPHONE HYDROCHLORIDE 1 MG: 2 INJECTION, SOLUTION INTRAMUSCULAR; INTRAVENOUS; SUBCUTANEOUS at 01:02

## 2025-02-19 RX ADMIN — ASPIRIN 81 MG: 81 TABLET, COATED ORAL at 08:02

## 2025-02-19 RX ADMIN — FUROSEMIDE 40 MG: 40 TABLET ORAL at 09:02

## 2025-02-19 RX ADMIN — SODIUM CHLORIDE: 9 INJECTION, SOLUTION INTRAVENOUS at 12:02

## 2025-02-19 RX ADMIN — INSULIN ASPART 2 UNITS: 100 INJECTION, SOLUTION INTRAVENOUS; SUBCUTANEOUS at 08:02

## 2025-02-19 RX ADMIN — FUROSEMIDE 40 MG: 40 TABLET ORAL at 08:02

## 2025-02-19 RX ADMIN — OXYCODONE 10 MG: 5 TABLET ORAL at 08:02

## 2025-02-19 RX ADMIN — INSULIN ASPART 2 UNITS: 100 INJECTION, SOLUTION INTRAVENOUS; SUBCUTANEOUS at 04:02

## 2025-02-19 RX ADMIN — CEPHALEXIN 500 MG: 500 CAPSULE ORAL at 05:02

## 2025-02-19 RX ADMIN — METOPROLOL TARTRATE 25 MG: 25 TABLET, FILM COATED ORAL at 10:02

## 2025-02-19 RX ADMIN — ISOSORBIDE MONONITRATE 60 MG: 30 TABLET, EXTENDED RELEASE ORAL at 08:02

## 2025-02-19 RX ADMIN — HYDROCORTISONE SODIUM SUCCINATE 100 MG: 100 INJECTION, POWDER, FOR SOLUTION INTRAMUSCULAR; INTRAVENOUS at 11:02

## 2025-02-19 NOTE — ASSESSMENT & PLAN NOTE
Patient with known CAD s/p stent placement and CABG, which is controlled Will continue Statin; hold aspirin and Plavix setting of impending pacing and monitor for S/Sx of angina/ACS. Continue to monitor on telemetry.     -repeat ischemic evaluation of after PPM; defer timing to cardiology  -cardiology follow-up outpatient  -patient with chest pain and elevated troponin of 3 on 02/19   -cardiology plan for coronary angiogram on 02/19

## 2025-02-19 NOTE — NURSING
Patient complains of , put her on oxygen 2L via NC, she also complains of chest pain, EKG done, she has a pacemaker that was placed on , she said she feels like having anxiety attack, she has PRN Lorazepam that was given yesterday around 1340 in afternoon and the order is PRN Daily, she also complains of tremors, Dr. Hany Alvarado informed, she said to give Lorazepam, stat Troponin, and Dr. Gonzalez ordered for CXR

## 2025-02-19 NOTE — PLAN OF CARE
Problem: Adult Inpatient Plan of Care  Goal: Plan of Care Review  Outcome: Progressing  Goal: Patient-Specific Goal (Individualized)  Outcome: Progressing  Goal: Absence of Hospital-Acquired Illness or Injury  Outcome: Progressing  Goal: Optimal Comfort and Wellbeing  Outcome: Progressing  Goal: Readiness for Transition of Care  Outcome: Progressing     Problem: Diabetes Comorbidity  Goal: Blood Glucose Level Within Targeted Range  Outcome: Progressing     Problem: Acute Kidney Injury/Impairment  Goal: Fluid and Electrolyte Balance  Outcome: Progressing  Goal: Improved Oral Intake  Outcome: Progressing  Goal: Effective Renal Function  Outcome: Progressing     Problem: Fall Injury Risk  Goal: Absence of Fall and Fall-Related Injury  Outcome: Progressing     Problem: Skin Injury Risk Increased  Goal: Skin Health and Integrity  Outcome: Progressing

## 2025-02-19 NOTE — SUBJECTIVE & OBJECTIVE
Interval History:  Patient with chest pain and tachycardic overnight.  Chest pain is worse with coughing and sneezing.  Device interrogated - normal function. No PMT Discussed with cardiology. Plan for heart catheterization on 02/19    Review of Systems   Constitutional:  Negative for fatigue and fever.   Eyes:  Negative for visual disturbance.   Respiratory:  Positive for chest tightness. Negative for cough and shortness of breath.    Cardiovascular:  Positive for chest pain. Negative for palpitations.   Gastrointestinal:  Negative for abdominal pain, nausea and vomiting.   Genitourinary:  Negative for difficulty urinating, dysuria, frequency and urgency.   Musculoskeletal:  Positive for neck pain.   Neurological:  Negative for dizziness and headaches.   Psychiatric/Behavioral:  Negative for confusion. The patient is nervous/anxious.      Objective:     Vital Signs (Most Recent):  Temp: 98.9 °F (37.2 °C) (02/19/25 1121)  Pulse: 89 (02/19/25 1230)  Resp: (!) 22 (02/19/25 1230)  BP: (!) 99/55 (02/19/25 1230)  SpO2: (!) 92 % (02/19/25 1230) Vital Signs (24h Range):  Temp:  [97.8 °F (36.6 °C)-99.2 °F (37.3 °C)] 98.9 °F (37.2 °C)  Pulse:  [] 89  Resp:  [18-22] 22  SpO2:  [92 %-98 %] 92 %  BP: ()/(50-67) 99/55     Weight: 85.9 kg (189 lb 6 oz)  Body mass index is 27.97 kg/m².    Intake/Output Summary (Last 24 hours) at 2/19/2025 1239  Last data filed at 2/19/2025 0932  Gross per 24 hour   Intake 420 ml   Output 1000 ml   Net -580 ml         Physical Exam  Vitals and nursing note reviewed.   Constitutional:       General: She is not in acute distress.     Appearance: She is ill-appearing.   HENT:      Mouth/Throat:      Mouth: Mucous membranes are moist.   Cardiovascular:      Rate and Rhythm: Normal rate and regular rhythm.      Pulses: Normal pulses.   Pulmonary:      Effort: Pulmonary effort is normal. No respiratory distress.      Breath sounds: Normal breath sounds. No wheezing or rales.      Comments: On  room air.  Clean surgical dressing on left anterior chest wall  Abdominal:      General: There is no distension.      Palpations: Abdomen is soft.      Tenderness: There is no abdominal tenderness.   Musculoskeletal:      Right lower leg: No edema.      Left lower leg: No edema.   Neurological:      General: No focal deficit present.      Mental Status: She is alert and oriented to person, place, and time.   Psychiatric:         Mood and Affect: Mood is anxious.      Comments: Patient very anxious on exam             Significant Labs: All pertinent labs within the past 24 hours have been reviewed.    Significant Imaging: I have reviewed all pertinent imaging results/findings within the past 24 hours.

## 2025-02-19 NOTE — SUBJECTIVE & OBJECTIVE
Interval History: UOP 500ml. Rapid response overnight, pt c/o chest pain and sob, EKG no STEMI. Troponin 1.9 --> 3.3. Pt given lorazepam and placed on 2L of O2 NC. Pt continues to have chest pain this am. Cardiology following. Pt NPO plan for [facility] today.    Review of patient's allergies indicates:   Allergen Reactions    Novolin 70/30 (semi-synthetic) Nausea And Vomiting     Severe vomiting on Relion 70/30    Sulfa (sulfonamide antibiotics) Anaphylaxis    Talwin [pentazocine lactate] Anaphylaxis    Victoza [liraglutide] Nausea And Vomiting    Glipizide Nausea Only    Codeine     Influenza virus vaccines Hives    Iodine and iodide containing products Hives    Levetiracetam Itching    Lyrica [pregabalin] Hallucinations    Neurontin [gabapentin]      Possible associated myoclonic jerk    Rituxan [rituximab] Hives    Zoloft [sertraline] Nausea And Vomiting     Current Facility-Administered Medications   Medication Frequency    0.9% NaCl infusion Continuous    0.9% NaCl infusion Continuous    acetaminophen tablet 650 mg Q4H PRN    albuterol-ipratropium 2.5 mg-0.5 mg/3 mL nebulizer solution 3 mL Q4H PRN    aspirin EC tablet 81 mg Daily    atorvastatin tablet 80 mg QHS    cefTRIAXone injection 2 g Q24H    dextrose 50% injection 12.5 g PRN    dextrose 50% injection 25 g PRN    fentaNYL 50 mcg/mL injection PRN    FLUoxetine capsule 40 mg Daily    furosemide tablet 40 mg BID    glucagon (human recombinant) injection 1 mg PRN    glucose chewable tablet 16 g PRN    glucose chewable tablet 24 g PRN    hydrALAZINE injection 10 mg Q4H PRN    hydrALAZINE tablet 100 mg Q8H    HYDROcodone-acetaminophen 5-325 mg per tablet 1 tablet Q4H PRN    HYDROmorphone (PF) injection 1 mg Q6H PRN    hydrOXYzine pamoate capsule 50 mg Q8H PRN    insulin aspart U-100 pen 0-10 Units QID (AC + HS) PRN    insulin aspart U-100 pen 8 Units TIDWM    insulin glargine U-100 (Lantus) pen 10 Units QHS    iohexoL (OMNIPAQUE 350) injection PRN    isosorbide  mononitrate 24 hr tablet 60 mg Daily    LIDOcaine (PF) 10 mg/ml (1%) injection PRN    LORazepam tablet 1 mg Daily PRN    melatonin tablet 6 mg Nightly PRN    metoprolol tartrate (LOPRESSOR) split tablet 25 mg BID    mupirocin 2 % ointment BID    nitroGLYCERIN SL tablet 0.4 mg Q5 Min PRN    ondansetron disintegrating tablet 8 mg Q8H PRN    oxyCODONE immediate release tablet 10 mg Q4H PRN    pantoprazole EC tablet 40 mg Daily    pneumoc 20-michael conj-dip cr(PF) (PREVNAR-20 (PF)) injection Syrg 0.5 mL vaccine x 1 dose    simethicone chewable tablet 80 mg TID PRN    sodium chloride 0.9% flush 10 mL PRN    sodium chloride 0.9% flush 10 mL PRN    ticagrelor tablet 60 mg BID       Objective:     Vital Signs (Most Recent):  Temp: 98.9 °F (37.2 °C) (02/19/25 1121)  Pulse: 104 (02/19/25 1121)  Resp: 19 (02/19/25 1121)  BP: 108/67 (02/19/25 1121)  SpO2: (!) 94 % (02/19/25 1121) Vital Signs (24h Range):  Temp:  [97.8 °F (36.6 °C)-99.2 °F (37.3 °C)] 98.9 °F (37.2 °C)  Pulse:  [] 104  Resp:  [18-20] 19  SpO2:  [94 %-98 %] 94 %  BP: (101-113)/(50-67) 108/67     Weight: 85.9 kg (189 lb 6 oz) (02/17/25 1616)  Body mass index is 27.97 kg/m².  Body surface area is 2.04 meters squared.    I/O last 3 completed shifts:  In: 480 [P.O.:480]  Out: 500 [Urine:500]     Physical Exam  Vitals and nursing note reviewed.   HENT:      Head: Normocephalic.      Nose: Nose normal.      Mouth/Throat:      Mouth: Mucous membranes are moist.   Eyes:      Extraocular Movements: Extraocular movements intact.      Conjunctiva/sclera: Conjunctivae normal.      Pupils: Pupils are equal, round, and reactive to light.   Cardiovascular:      Rate and Rhythm: Normal rate.      Comments: + left chest wall pacemaker  Pulmonary:      Effort: Pulmonary effort is normal.      Breath sounds: Normal breath sounds.   Abdominal:      Palpations: Abdomen is soft.   Musculoskeletal:         General: Normal range of motion.      Cervical back: Normal range of motion.       Right lower leg: No edema.      Left lower leg: No edema.   Skin:     General: Skin is warm.   Neurological:      General: No focal deficit present.      Mental Status: She is alert.   Psychiatric:         Mood and Affect: Mood normal.          Significant Labs:  CBC:   Recent Labs   Lab 02/19/25  0127   WBC 12.22   RBC 2.79*   HGB 8.5*   HCT 25.6*      MCV 92   MCH 30.5   MCHC 33.2     CMP:   Recent Labs   Lab 02/15/25  0611 02/16/25  1114 02/17/25  1200 02/18/25  0726 02/19/25  0127   *   < > 212*   < > 95   CALCIUM 8.8   < > 8.7   < > 8.6*   ALBUMIN 3.3*   < > 3.1*  --   --    PROT 7.4  --   --   --   --       < > 133*   < > 137   K 4.3   < > 5.0   < > 4.3   CO2 15*   < > 13*   < > 19*      < > 106   < > 105   BUN 83*   < > 100*   < > 101*   CREATININE 3.5*   < > 4.1*   < > 3.8*   ALKPHOS 217*  --   --   --   --    ALT 42  --   --   --   --    AST 44*  --   --   --   --    BILITOT 0.2  --   --   --   --     < > = values in this interval not displayed.     All labs within the past 24 hours have been reviewed.     Significant Imaging:  Labs: Reviewed

## 2025-02-19 NOTE — ASSESSMENT & PLAN NOTE
Dressing changed - site C/D/I. A-sensed V-paced. Would hydrate today with IVF given N/V and rsising Cr. Ok for d/c in AM if stable. OV with me 2 weeks for staple removal. Resume ASA/brilinta. Rx for pain medication and Abx sent to pharmacy    2/19/25 has remained tachycardic 100-110. Magnet placed over device due to concern for PMT. Device interrogated - normal function. No PMT. Tracking atria appropriately at 100-110. Check stat echo to look for pericardial effusion and EF. Add BB to slow atrial rate as tolerated

## 2025-02-19 NOTE — ASSESSMENT & PLAN NOTE
Baseline creatinine 2.0  On presentation creatinine 3.5    KYA due to cardiogenic shock secondary to complete heart block  2/17 pacemaker placed  2/19 KYA improving Cr 3.8,     Plan/Recommendation  No acute indication for dialysis, IVF or diuretics at this time,   Continue to monitor kidney function and UOP     -Keep MAP > 65  -Keep hemoglobin > 7  -Strict ins and outs  -Avoid nephrotoxic agents if possible and renally dose medications  -Avoid drastic hemodynamic changes if possible    Hyponatremia - likely due to KYA,   improved. continue to monitor  Metabolic acidosis - due to KYA, continue to monitor. CO2 15--> 19 improving

## 2025-02-19 NOTE — PROGRESS NOTES
[facility] Medicine  Progress Note    Patient Name: Lorena Contreras  MRN: [id number]  Patient Class: IP- Inpatient   Admission Date: 2/15/2025  Length of Stay: 4 days  Attending Physician: Singh Barbour DO  Primary Care Provider: Jorge Paris MD        Subjective     Principal Problem:Complete heart block        HPI:  A pleasant  72 yo F with PMHx of  DM2, Fibromyalgia, lymphoma s/p chemo, HTN, HLD, CVA, MI, CAD s/p PCIs  CKD3b, HFpEF, and anemia who presented to the ED with a chief complaint of chest pain radiating to the back with onset a day before presentation.    Pain was said to be sudden in onset; no associated nausea or diaphoresis.  No Preceding PND orthopnea or leg swelling; pain was described as different from her previous coronary events.  Patient however endorsed dizziness but this has been going on for some time.  She denied any syncopal event  Symptoms progress and this prompted patient to come to the ED    Retrospective chart review indicates multiple ED visits for observation for atypical chest pain.  Cardiac history is significant for CAD with JESIKA x 2 to RCA 3/29/19 - JESIKA to RI and Cx 1/8/20      On presentation to the ED; patient was bradycardic to 32 BPM which subsequently improved and mildly hypertensive.  EKG showed complete heart block.  Patient was admitted to the ICU for further management.    Overview/Hospital Course:  73-year-old female with history of diabetes, anxiety,HTN, HLD, CVA, MI, CAD s/p PCIs  CKD3b, HFpEF, and anemia admitted to ICU on 02/15/2025 for complete heart block and KYA.  Cardiology consulted- patient s/p ppm on 02/17.  Patient to start aspirin and Brilinta on 02/18.  Nephrology consulted for KYA on CKD.Patient with chest pain and tachycardia overnight on 02/18. Fount to have elevated troponin 1.9 and peaked at 3.3.  EKG noted and reviewed with NO STEMI and unchanged from previous on 2/17. Device interrogated - normal function. No PMT  Discussed with cardiology. Plan for heart catheterization on 02/19    Interval History:  Patient with chest pain and tachycardic overnight.  Chest pain is worse with coughing and sneezing.  Device interrogated - normal function. No PMT Discussed with cardiology. Plan for heart catheterization on 02/19    Review of Systems   Constitutional:  Negative for fatigue and fever.   Eyes:  Negative for visual disturbance.   Respiratory:  Positive for chest tightness. Negative for cough and shortness of breath.    Cardiovascular:  Positive for chest pain. Negative for palpitations.   Gastrointestinal:  Negative for abdominal pain, nausea and vomiting.   Genitourinary:  Negative for difficulty urinating, dysuria, frequency and urgency.   Musculoskeletal:  Positive for neck pain.   Neurological:  Negative for dizziness and headaches.   Psychiatric/Behavioral:  Negative for confusion. The patient is nervous/anxious.      Objective:     Vital Signs (Most Recent):  Temp: 98.9 °F (37.2 °C) (02/19/25 1121)  Pulse: 89 (02/19/25 1230)  Resp: (!) 22 (02/19/25 1230)  BP: (!) 99/55 (02/19/25 1230)  SpO2: (!) 92 % (02/19/25 1230) Vital Signs (24h Range):  Temp:  [97.8 °F (36.6 °C)-99.2 °F (37.3 °C)] 98.9 °F (37.2 °C)  Pulse:  [] 89  Resp:  [18-22] 22  SpO2:  [92 %-98 %] 92 %  BP: ()/(50-67) 99/55     Weight: 85.9 kg (189 lb 6 oz)  Body mass index is 27.97 kg/m².    Intake/Output Summary (Last 24 hours) at 2/19/2025 1239  Last data filed at 2/19/2025 0932  Gross per 24 hour   Intake 420 ml   Output 1000 ml   Net -580 ml         Physical Exam  Vitals and nursing note reviewed.   Constitutional:       General: She is not in acute distress.     Appearance: She is ill-appearing.   HENT:      Mouth/Throat:      Mouth: Mucous membranes are moist.   Cardiovascular:      Rate and Rhythm: Normal rate and regular rhythm.      Pulses: Normal pulses.   Pulmonary:      Effort: Pulmonary effort is normal. No respiratory distress.      Breath  sounds: Normal breath sounds. No wheezing or rales.      Comments: On room air.  Clean surgical dressing on left anterior chest wall  Abdominal:      General: There is no distension.      Palpations: Abdomen is soft.      Tenderness: There is no abdominal tenderness.   Musculoskeletal:      Right lower leg: No edema.      Left lower leg: No edema.   Neurological:      General: No focal deficit present.      Mental Status: She is alert and oriented to person, place, and time.   Psychiatric:         Mood and Affect: Mood is anxious.      Comments: Patient very anxious on exam             Significant Labs: All pertinent labs within the past 24 hours have been reviewed.    Significant Imaging: I have reviewed all pertinent imaging results/findings within the past 24 hours.    Assessment and Plan     * Complete heart block  Hx of chest pain on presentation; different from previous anginal pains  EKG showed complete heart block  Discontinue all AV frances blocking agents  Monitor patient on telemetry  S/p Medtronic PPM on 02/17  Continue Keflex; arm sling per Cardiology  Restart aspirin and Brilinta 02/18/25  Patient with chest pain and tachycardia on 02/19. Troponin peaked at 3  Discussed with cardiology, plan for coronary angiogram 2/19  Change antibiotics to ceftriaxone to cover UTI, urine culture with Klebsiella oxytoca      Cardiac pacemaker in situ  -s/p ppm on 02/17.    Acute kidney injury superimposed on stage 3b chronic kidney disease  KYA is likely due to pre-renal azotemia due to intravascular volume depletion. Baseline creatinine is  1.6 to 2.2 . Most recent creatinine and eGFR are listed below.  Recent Labs     02/17/25  1200 02/18/25  0726 02/19/25  0127   CREATININE 4.1* 4.1* 3.8*   EGFRNORACEVR 11* 11* 12*        Plan  - KYA is improving  - Avoid nephrotoxins and renally dose meds for GFR listed above  - Monitor urine output, serial BMP, and adjust therapy as needed  - nephrology consulted; input  appreciated  - monitor closely given patient plan for angiogram today with contrast    Coronary artery disease of native artery of native heart with stable angina pectoris  Patient with known CAD s/p stent placement and CABG, which is controlled Will continue Statin; hold aspirin and Plavix setting of impending pacing and monitor for S/Sx of angina/ACS. Continue to monitor on telemetry.     -repeat ischemic evaluation of after PPM; defer timing to cardiology  -cardiology follow-up outpatient  -patient with chest pain and elevated troponin of 3 on 02/19   -cardiology plan for coronary angiogram on 02/19    Aortic stenosis  Echocardiogram with evidence of aortic stenosis that is moderate . The patient's most recent echocardiogram result is listed below. We will manage the valvular abnormality by avoiding volume overload; and cardiology follow up as an outpatient    Echo Saline Bubble? No  Result Date: 2/19/2025    Left Ventricle: The left ventricle is normal in size. There is mild   concentric hypertrophy. Septal motion is consistent with pacing. There is   normal systolic function with a visually estimated ejection fraction of 60   - 65%. Grade II diastolic dysfunction.    Right Ventricle: Mild right ventricular enlargement. Systolic function   is normal.    Left Atrium: Left atrium is moderately dilated.    Right Atrium: Right atrium is mildly dilated.    Aortic Valve: There is moderate stenosis. Aortic valve area by VTI is   1.0 cm². Aortic valve peak velocity is 3.2 m/s. Mean gradient is 24 mmHg.   The dimensionless index is 0.33.    Mitral Valve: There is mild stenosis. The mean pressure gradient across   the mitral valve is 12 mmHg at a heart rate of  bpm. There is mild   regurgitation.    Tricuspid Valve: There is moderate regurgitation.    Pulmonary Artery: The estimated pulmonary artery systolic pressure is   72 mmHg.    IVC/SVC: Intermediate venous pressure at 8 mmHg.    Pericardium: There is a trivial  posterior effusion.        Echo  Result Date: 2/15/2025    Left Ventricle: The left ventricle is normal in size. There is moderate   concentric hypertrophy. There is hyperdynamic systolic function with a   visually estimated ejection fraction of 70 - 75%.    Right Ventricle: Normal right ventricular cavity size. Systolic   function is normal.    Left Atrium: Left atrium is moderately dilated.    Right Atrium: Right atrium is mildly dilated.    Aortic Valve: There is moderate stenosis. Aortic valve area by VTI is   0.8 cm². Aortic valve peak velocity is 3.4 m/s. Mean gradient is 27 mmHg.   The dimensionless index is 0.27.    Mitral Valve: There is mild stenosis. The mean pressure gradient across   the mitral valve is 5 mmHg at a heart rate of 42  bpm. There is mild   regurgitation.    Tricuspid Valve: There is moderate regurgitation.    Pulmonary Artery: The estimated pulmonary artery systolic pressure is   49 mmHg.    IVC/SVC: Intermediate venous pressure at 8 mmHg.        Echo  Result Date: 10/31/2024    Left Ventricle: The left ventricle is normal in size. Normal wall   thickness. Normal wall motion. There is normal systolic function with a   visually estimated ejection fraction of 60 - 65%. Grade II diastolic   dysfunction. Elevated left ventricular filling pressure.    Right Ventricle: Normal right ventricular cavity size. Wall thickness   is normal. Systolic function is normal.    Left Atrium: Left atrium is severely dilated.    Aortic Valve: There is moderate aortic valve sclerosis. Moderately   restricted motion. There is moderate stenosis. Aortic valve area by VTI is   1.1 cm2. Aortic valve peak velocity is 3.1 m/s. Mean gradient is 20.1   mmHg. The dimensionless index is 0.37.    Mitral Valve: There is mild regurgitation.    Tricuspid Valve: There is mild regurgitation.    Pulmonary Artery: There is pulmonary hypertension. The estimated   pulmonary artery systolic pressure is 46 mmHg.    IVC/SVC: Normal  venous pressure at 3 mmHg.           Mixed hyperlipidemia  Continue home statin      Diabetes mellitus  Patient's FSGs are controlled on current medication regimen.  Last A1c reviewed-   Lab Results   Component Value Date    HGBA1C 5.9 (H) 01/14/2025     Most recent fingerstick glucose reviewed-   Recent Labs   Lab 02/18/25  1604 02/18/25  1937 02/19/25  0736   POCTGLUCOSE 246* 192* 161*       Current correctional scale  Medium  Maintain anti-hyperglycemic dose as follows-   Antihyperglycemics (From admission, onward)      Start     Stop Route Frequency Ordered    02/19/25 2100  insulin glargine U-100 (Lantus) pen 10 Units         -- SubQ Nightly 02/19/25 0737    02/18/25 1645  insulin aspart U-100 pen 8 Units         -- SubQ 3 times daily with meals 02/18/25 1428    02/15/25 1358  insulin aspart U-100 pen 0-10 Units         -- SubQ Before meals & nightly PRN 02/15/25 1258          Hold Oral hypoglycemics while patient is in the hospital.; cardiac consistent carbohydrate diet; monitor blood glucose log and titrate to effect  Prandial aspart added    Hyponatremia  Hyponatremia is likely due to renal insufficiency. The patient's most recent sodium results are listed below.  Recent Labs     02/17/25  1200 02/18/25  0726 02/19/25  0127   * 133* 137       Plan  - nephrology consulted  - Monitor sodium Daily.   - Patient hyponatremia is stable  - monitor closely    Anxiety  -hydroxyzine p.r.n. every 8 hours for anxiety      Stage 3b chronic kidney disease  Creatine stable for now. BMP reviewed- noted Estimated Creatinine Clearance: 15.2 mL/min (A) (based on SCr of 3.8 mg/dL (H)). according to latest data. Based on current GFR, CKD stage is stage 4 - GFR 15-29.  Monitor UOP and serial BMP and adjust therapy as needed. Renally dose meds. Avoid nephrotoxic medications and procedures.      VTE Risk Mitigation (From admission, onward)           Ordered     IP VTE HIGH RISK PATIENT  Once         02/15/25 1252     Place  sequential compression device  Until discontinued         02/15/25 1252     Place sequential compression device  Until discontinued         02/15/25 0802                    Discharge Planning   MIKHAIL: 2/21/2025     Code Status: Full Code   Medical Readiness for Discharge Date:   Discharge Plan A: Home                Please place Justification for CONSTANTINO Barbour DO  Department of Hospital Medicine   [facility]

## 2025-02-19 NOTE — PROGRESS NOTES
[facility]  Cardiology  Progress Note    Patient Name: Lorena Contreras  MRN: [id number]  Admission Date: 2/15/2025  Hospital Length of Stay: 4 days  Code Status: Full Code   Attending Physician: Singh Barbour DO   Primary Care Physician: Jorge Paris MD  Expected Discharge Date: 2/21/2025  Principal Problem:Complete heart block    Subjective:     Hospital Course:   2/16/25 C/O neck pain and poor appetite. HR 40 CHB. Agreeable to PPM tomorrow  2/18/25 N/V overnight. Feels a little better this AM. A-sensed V-paced 90s. Cr 4.1. PPM dressing changed - site C/D/I    2/19/25 Remains tachycardic 100-110. Magnet placed over device due to concern that she was in PMT. Medtronic interrogated device with good function and no PMT detected. Device is tracking atrial rate at 100-110. Patient feels fatigued. Had CP overnight. Troponin 1.9 to 2.3 to 3.3. Repeat echo ordered      2/17/25 Medtronic dual pacemaker placed left SC vein     Echo 2/15/25    Left Ventricle: The left ventricle is normal in size. There is moderate concentric hypertrophy. There is hyperdynamic systolic function with a visually estimated ejection fraction of 70 - 75%.    Right Ventricle: Normal right ventricular cavity size. Systolic function is normal.    Left Atrium: Left atrium is moderately dilated.    Right Atrium: Right atrium is mildly dilated.    Aortic Valve: There is moderate stenosis. Aortic valve area by VTI is 0.8 cm². Aortic valve peak velocity is 3.4 m/s. Mean gradient is 27 mmHg. The dimensionless index is 0.27.    Mitral Valve: There is mild stenosis. The mean pressure gradient across the mitral valve is 5 mmHg at a heart rate of 42  bpm. There is mild regurgitation.    Tricuspid Valve: There is moderate regurgitation.    Pulmonary Artery: The estimated pulmonary artery systolic pressure is 49 mmHg.    IVC/SVC: Intermediate venous pressure at 8 mmHg.    Interval History:     Review of Systems   Unable to perform ROS:  Acuity of condition   Constitutional: Negative for decreased appetite.   HENT:  Negative for ear discharge.    Eyes:  Negative for blurred vision.   Respiratory:  Negative for hemoptysis.    Endocrine: Negative for polyphagia.   Hematologic/Lymphatic: Negative for adenopathy.   Skin:  Negative for color change.   Musculoskeletal:  Negative for joint swelling.   Genitourinary:  Negative for bladder incontinence.   Neurological:  Negative for brief paralysis.   Psychiatric/Behavioral:  Negative for hallucinations.    Allergic/Immunologic: Negative for hives.     Objective:     Vital Signs (Most Recent):  Temp: 98 °F (36.7 °C) (02/19/25 0736)  Pulse: 85 (02/19/25 0736)  Resp: 18 (02/19/25 0813)  BP: 113/60 (02/19/25 0736)  SpO2: (!) 94 % (02/19/25 0736) Vital Signs (24h Range):  Temp:  [97.2 °F (36.2 °C)-99.2 °F (37.3 °C)] 98 °F (36.7 °C)  Pulse:  [] 85  Resp:  [18-20] 18  SpO2:  [94 %-98 %] 94 %  BP: (101-120)/(50-71) 113/60     Weight: 85.9 kg (189 lb 6 oz)  Body mass index is 27.97 kg/m².     SpO2: (!) 94 %         Intake/Output Summary (Last 24 hours) at 2/19/2025 1042  Last data filed at 2/19/2025 0932  Gross per 24 hour   Intake 420 ml   Output 1000 ml   Net -580 ml       Lines/Drains/Airways       Drain  Duration             Female External Urinary Catheter w/ Suction 02/15/25 0011 4 days              Peripheral Intravenous Line  Duration                  Peripheral IV - Single Lumen 02/15/25 0610 20 G Anterior;Right Forearm 4 days         Peripheral IV - Single Lumen 02/18/25 2029 22 G Anterior;Left Forearm <1 day                       Physical Exam  Constitutional:       Appearance: She is well-developed.   HENT:      Head: Normocephalic and atraumatic.   Eyes:      Conjunctiva/sclera: Conjunctivae normal.      Pupils: Pupils are equal, round, and reactive to light.   Cardiovascular:      Rate and Rhythm: Tachycardia present.      Pulses: Intact distal pulses.      Heart sounds: Normal heart sounds.    Pulmonary:      Effort: Pulmonary effort is normal.      Breath sounds: Normal breath sounds.   Abdominal:      General: Bowel sounds are normal.      Palpations: Abdomen is soft.   Musculoskeletal:         General: Normal range of motion.      Cervical back: Normal range of motion and neck supple.   Skin:     General: Skin is warm and dry.   Neurological:      Mental Status: She is alert and oriented to person, place, and time.            Significant Labs: All pertinent lab results from the last 24 hours have been reviewed.    Significant Imaging: Echocardiogram: 2D echo with color flow doppler: No results found. However, due to the size of the patient record, not all encounters were searched. Please check Results Review for a complete set of results.  Assessment and Plan:     Brief HPI:     Cardiac pacemaker in situ  Dressing changed - site C/D/I. A-sensed V-paced. Would hydrate today with IVF given N/V and rsising Cr. Ok for d/c in AM if stable. OV with me 2 weeks for staple removal. Resume ASA/brilinta. Rx for pain medication and Abx sent to pharmacy    2/19/25 has remained tachycardic 100-110. Magnet placed over device due to concern for PMT. Device interrogated - normal function. No PMT. Tracking atria appropriately at 100-110. Check stat echo to look for pericardial effusion and EF. Add BB to slow atrial rate as tolerated    CHB (complete heart block)  New Dx. Hold rate lowering medications. Hold ASA/brilinta. BP adequate no need for TVP at  this point. Will plan PPM Monday if CHB persists. EF normal on echo    2/16/25 Still with CHB. Dual PPM tomorrow - risks/benefits explained and she agrees to proceed    Aortic stenosis  AS moderate    Echo  Result Date: 2/15/2025    Left Ventricle: The left ventricle is normal in size. There is moderate   concentric hypertrophy. There is hyperdynamic systolic function with a   visually estimated ejection fraction of 70 - 75%.    Right Ventricle: Normal right ventricular  cavity size. Systolic   function is normal.    Left Atrium: Left atrium is moderately dilated.    Right Atrium: Right atrium is mildly dilated.    Aortic Valve: There is moderate stenosis. Aortic valve area by VTI is   0.8 cm². Aortic valve peak velocity is 3.4 m/s. Mean gradient is 27 mmHg.   The dimensionless index is 0.27.    Mitral Valve: There is mild stenosis. The mean pressure gradient across   the mitral valve is 5 mmHg at a heart rate of 42  bpm. There is mild   regurgitation.    Tricuspid Valve: There is moderate regurgitation.    Pulmonary Artery: The estimated pulmonary artery systolic pressure is   49 mmHg.    IVC/SVC: Intermediate venous pressure at 8 mmHg.        Echo  Result Date: 10/31/2024    Left Ventricle: The left ventricle is normal in size. Normal wall   thickness. Normal wall motion. There is normal systolic function with a   visually estimated ejection fraction of 60 - 65%. Grade II diastolic   dysfunction. Elevated left ventricular filling pressure.    Right Ventricle: Normal right ventricular cavity size. Wall thickness   is normal. Systolic function is normal.    Left Atrium: Left atrium is severely dilated.    Aortic Valve: There is moderate aortic valve sclerosis. Moderately   restricted motion. There is moderate stenosis. Aortic valve area by VTI is   1.1 cm2. Aortic valve peak velocity is 3.1 m/s. Mean gradient is 20.1   mmHg. The dimensionless index is 0.37.    Mitral Valve: There is mild regurgitation.    Tricuspid Valve: There is mild regurgitation.    Pulmonary Artery: There is pulmonary hypertension. The estimated   pulmonary artery systolic pressure is 46 mmHg.    IVC/SVC: Normal venous pressure at 3 mmHg.           Coronary artery disease of native artery of native heart with stable angina pectoris  Hx multivessel CAD. Suspect CP is demand ischemia from CHB over ACS.High risk for LHC with Cr 3.5. Will plan repeat ischemic evlaution after PPM if still symptomatic. EF normal on  echo    2/19/25 CP overnight. Troponin increased to 3. Repeat echo stat. High risk for BHUPENDRA with ARF but will discuss repeat cath based on echo and clinical course. Back on ASA/brilinta    Mixed hyperlipidemia  On statin          VTE Risk Mitigation (From admission, onward)           Ordered     IP VTE HIGH RISK PATIENT  Once         02/15/25 1252     Place sequential compression device  Until discontinued         02/15/25 1252     Place sequential compression device  Until discontinued         02/15/25 0802                    Karl Rico MD  Cardiology  [facility]

## 2025-02-19 NOTE — NURSING
[facility] [place]  Nurses Note -- 4 Eyes      2/18/2025       Skin assessed on: Q Shift      [x] No Pressure Injuries Present    [x]Prevention Measures Documented    [] Yes LDA  for Pressure Injury Previously documented     [] Yes New Pressure Injury Discovered   [] LDA for New Pressure Injury Added      Attending RN:  Sally Street RN     Second RN:  FRANCIS Salazar

## 2025-02-19 NOTE — ASSESSMENT & PLAN NOTE
KYA is likely due to pre-renal azotemia due to intravascular volume depletion. Baseline creatinine is  1.6 to 2.2 . Most recent creatinine and eGFR are listed below.  Recent Labs     02/17/25  1200 02/18/25  0726 02/19/25  0127   CREATININE 4.1* 4.1* 3.8*   EGFRNORACEVR 11* 11* 12*        Plan  - KYA is improving  - Avoid nephrotoxins and renally dose meds for GFR listed above  - Monitor urine output, serial BMP, and adjust therapy as needed  - nephrology consulted; input appreciated  - monitor closely given patient plan for angiogram today with contrast

## 2025-02-19 NOTE — PT/OT/SLP PROGRESS
Occupational Therapy      Patient Name:  Lorena Contreras   MRN:  [id number]    Patient not seen today secondary to Off the floor for procedure/surgery( left HC) Will follow-up as appropriate.    2/19/2025

## 2025-02-19 NOTE — PROCEDURES
"Lorena Contreras is a 74 y.o. female patient.    Temp: 98.9 °F (37.2 °C) (02/19/25 1121)  Pulse: 104 (02/19/25 1121)  Resp: 19 (02/19/25 1121)  BP: 108/67 (02/19/25 1121)  SpO2: (!) 94 % (02/19/25 1121)  Weight: 85.9 kg (189 lb 6 oz) (02/17/25 1616)  Height: 5' 9" (175.3 cm) (02/17/25 1616)  Mallampati Scale: Class II  ASA Classification: Class 3    Procedures    [facility]   Dr Rico  Pre-op Dx NSTEMI  Post-op Dx same  Specimen none  EBL < 50 cc    2/19/25 [facility] - EDP 25, Peak to peak LV to Aortic gradient 20 mmHg, LAD long 50% mid, D1 long 70% proximal, RI luminal irregularities - stent patent, Cx luminal irregularities - stent patent, T  RCA patent mid stent with 50% ISR    No culprit lesion identified - reviewed with Dr Saldaña - medical Rx  25 cc contrast used - hydrate post procedure  Continue ASA/brilinta    2/19/2025    "

## 2025-02-19 NOTE — SUBJECTIVE & OBJECTIVE
Interval History:     Review of Systems   Unable to perform ROS: Acuity of condition   Constitutional: Negative for decreased appetite.   HENT:  Negative for ear discharge.    Eyes:  Negative for blurred vision.   Respiratory:  Negative for hemoptysis.    Endocrine: Negative for polyphagia.   Hematologic/Lymphatic: Negative for adenopathy.   Skin:  Negative for color change.   Musculoskeletal:  Negative for joint swelling.   Genitourinary:  Negative for bladder incontinence.   Neurological:  Negative for brief paralysis.   Psychiatric/Behavioral:  Negative for hallucinations.    Allergic/Immunologic: Negative for hives.     Objective:     Vital Signs (Most Recent):  Temp: 98 °F (36.7 °C) (02/19/25 0736)  Pulse: 85 (02/19/25 0736)  Resp: 18 (02/19/25 0813)  BP: 113/60 (02/19/25 0736)  SpO2: (!) 94 % (02/19/25 0736) Vital Signs (24h Range):  Temp:  [97.2 °F (36.2 °C)-99.2 °F (37.3 °C)] 98 °F (36.7 °C)  Pulse:  [] 85  Resp:  [18-20] 18  SpO2:  [94 %-98 %] 94 %  BP: (101-120)/(50-71) 113/60     Weight: 85.9 kg (189 lb 6 oz)  Body mass index is 27.97 kg/m².     SpO2: (!) 94 %         Intake/Output Summary (Last 24 hours) at 2/19/2025 1042  Last data filed at 2/19/2025 0932  Gross per 24 hour   Intake 420 ml   Output 1000 ml   Net -580 ml       Lines/Drains/Airways       Drain  Duration             Female External Urinary Catheter w/ Suction 02/15/25 0011 4 days              Peripheral Intravenous Line  Duration                  Peripheral IV - Single Lumen 02/15/25 0610 20 G Anterior;Right Forearm 4 days         Peripheral IV - Single Lumen 02/18/25 2029 22 G Anterior;Left Forearm <1 day                       Physical Exam  Constitutional:       Appearance: She is well-developed.   HENT:      Head: Normocephalic and atraumatic.   Eyes:      Conjunctiva/sclera: Conjunctivae normal.      Pupils: Pupils are equal, round, and reactive to light.   Cardiovascular:      Rate and Rhythm: Tachycardia present.      Pulses:  Intact distal pulses.      Heart sounds: Normal heart sounds.   Pulmonary:      Effort: Pulmonary effort is normal.      Breath sounds: Normal breath sounds.   Abdominal:      General: Bowel sounds are normal.      Palpations: Abdomen is soft.   Musculoskeletal:         General: Normal range of motion.      Cervical back: Normal range of motion and neck supple.   Skin:     General: Skin is warm and dry.   Neurological:      Mental Status: She is alert and oriented to person, place, and time.            Significant Labs: All pertinent lab results from the last 24 hours have been reviewed.    Significant Imaging: Echocardiogram: 2D echo with color flow doppler: No results found. However, due to the size of the patient record, not all encounters were searched. Please check Results Review for a complete set of results.

## 2025-02-19 NOTE — NURSING
Received email from Thu Stein from [employer] asking to remove LDA for PI to left heel since it was not acquired on this admit. Pt left heel is red but blanchable. Pt stated it has been red for months. LDA removed.

## 2025-02-19 NOTE — AI DETERIORATION ALERT
Artificial Intelligence Notification  [facility] [place]  [place] [address]  Phone: [phone]    This documentation was triggered by an Artificial Intelligence Notification:    Admit Date: 2/15/2025   LOS: 4  Code Status: Full Code  : 1950  Age: 74 y.o.  Weight:   Wt Readings from Last 1 Encounters:   25 85.9 kg (189 lb 6 oz)        Sex: female  Bed: [address]  MRN: [id number]  Attending Physician: Singh Barbour DO     Date of Alert: 2025  Time AI Alert Received: 4:04pm            Vitals:    25 1535   BP: (!) 101/55   Pulse: 73   Resp: (!) 24   Temp:      SpO2: 95 %      Artificial Intelligence alert discussed with Provider:     Name: Singh Barbour   Date/Time of Provider Notification: 4:04pm      Patient Condition:  Patient appears in no acute distress.  Just returned from PACU.  Laying flat, sleeping.  Continue post cath protocol

## 2025-02-19 NOTE — NURSING
[facility] [place]  Nurses Note -- 4 Eyes      2/18/2025     Blanchable redness noted to left heel. Pt stated she's been having it for a few months now.     Skin assessed on: Q Shift      [x] No Pressure Injuries Present    [x]Prevention Measures Documented    [] Yes LDA  for Pressure Injury Previously documented     [] Yes New Pressure Injury Discovered   [] LDA for New Pressure Injury Added      Attending RN:  Marie Beyer RN     Second RN:  FRANCIS Weaver

## 2025-02-19 NOTE — CONSULTS
[facility] S[facility]  Cardiology  Consult Note    Patient Name: Lorena Contreras  MRN: [id number]  Admission Date: 2/15/2025  Hospital Length of Stay: 4 days  Code Status: Full Code   Attending Provider: Singh Barbour DO   Consulting Provider: Karl Rico MD  Primary Care Physician: Jorge Paris MD  Principal Problem:Complete heart block    Patient information was obtained from patient and ER records.     Inpatient consult to Cardiology  Consult performed by: Karl Rico MD  Consult ordered by: Lexii Gonzalez MD        Subjective:

## 2025-02-19 NOTE — NURSING
[facility] [place]  Nurses Note -- 4 Eyes      2/19/2025       Skin assessed on: Q Shift      [x] No Pressure Injuries Present    [x]Prevention Measures Documented    [] Yes LDA  for Pressure Injury Previously documented     [] Yes New Pressure Injury Discovered   [] LDA for New Pressure Injury Added      Attending RN:  Suki Bautista RN     Second RN:  Roberto

## 2025-02-19 NOTE — PLAN OF CARE
Problem: Occupational Therapy  Goal: Occupational Therapy Goal  Outcome: Progressing    Description: Goals to be met by: 03/18/2025      Patient will increase functional independence with ADLs by performing:    UE Dressing with Modified [place].  LE Dressing with Modified [place].  Grooming while seated or standing with Modified [place].  Toileting from toilet or BSC with Modified [place] for hygiene and clothing management.   Toilet transfer to toilet or BSC with Modified [place].  Increased functional strength to WFL for self care.  Upper extremity exercise program x10 reps per handout, with assistance as needed.          Pt would benefit from continued OT to address deficits in self care and functional mobility. Recommending low intensity therapy; DME needs likely no DME needs

## 2025-02-19 NOTE — PLAN OF CARE
Changes in medical condition or discharge plan: Pt having a heart cath completed today.    Does patient need new DME? TBD    Follow up appts needed: PCP, cardiology    Medically stable: No    Estimated Discharge Date:  1- 2 days    CM will follow up as needed.     02/19/25 1105   Discharge Reassessment   Assessment Type Discharge Planning Reassessment   Did the patient's condition or plan change since previous assessment? Yes   Discharge Plan discussed with: Patient   Communicated MIKHAIL with patient/caregiver Yes   Discharge Plan A Home   Discharge Plan B Home with family   DME Needed Upon Discharge    (tbd)   Transition of Care Barriers   (none)   Why the patient remains in the hospital Requires continued medical care   Post-Acute Status   Coverage [employer] MANAGED MEDICARE - [employer] [facility] HMO PPO SPECIAL NEEDS

## 2025-02-19 NOTE — CARE UPDATE
Continues to feel poorly. Having CP and SOB. Echo with normal EF and trivial pericardial effusion. Paradoxical septal motion consistent with RV pacing. Discussed emergent LHC for ongoing symptoms and rising troponin. Risks/benefits explained including risk for BHUPENDRA given renal insufficiency. Consent signed - on chart

## 2025-02-19 NOTE — ASSESSMENT & PLAN NOTE
Patient's FSGs are controlled on current medication regimen.  Last A1c reviewed-   Lab Results   Component Value Date    HGBA1C 5.9 (H) 01/14/2025     Most recent fingerstick glucose reviewed-   Recent Labs   Lab 02/18/25  1604 02/18/25  1937 02/19/25  0736   POCTGLUCOSE 246* 192* 161*       Current correctional scale  Medium  Maintain anti-hyperglycemic dose as follows-   Antihyperglycemics (From admission, onward)    Start     Stop Route Frequency Ordered    02/19/25 2100  insulin glargine U-100 (Lantus) pen 10 Units         -- SubQ Nightly 02/19/25 0737    02/18/25 1645  insulin aspart U-100 pen 8 Units         -- SubQ 3 times daily with meals 02/18/25 1428    02/15/25 1358  insulin aspart U-100 pen 0-10 Units         -- SubQ Before meals & nightly PRN 02/15/25 1258        Hold Oral hypoglycemics while patient is in the hospital.; cardiac consistent carbohydrate diet; monitor blood glucose log and titrate to effect  Prandial aspart added

## 2025-02-19 NOTE — PROGRESS NOTES
HOSPITALIST ON CALL NOTE:    Contacted by the RN regarding elevated troponin 1.9. Pt. Had been c/o chest pain. EKG noted and reviewed with NO STEMI and unchanged from previous on 2/17. Under went pacemaker placement on 2/17. Pt. Thought it was an anxiety attack and given PRN lorazepam. Already on ASA, Brillenta. Statin.   Pt. Currently resting now and without chest pain.     Called and d/w Cardiology on call. Will f/u with her in am. Requested repeat echo.       Vitals:    02/18/25 2310 02/19/25 0104 02/19/25 0159 02/19/25 0258   BP:  (!) 111/55     BP Location:  Right arm     Patient Position:  Lying     Pulse: 110 109  102   Resp:   19    Temp:       TempSrc:       SpO2:       Weight:       Height:

## 2025-02-19 NOTE — ASSESSMENT & PLAN NOTE
Hx of chest pain on presentation; different from previous anginal pains  EKG showed complete heart block  Discontinue all AV frances blocking agents  Monitor patient on telemetry  S/p [employer] PPM on 02/17  Continue Keflex; arm sling per Cardiology  Restart aspirin and Brilinta 02/18/25  Patient with chest pain and tachycardia on 02/19. Troponin peaked at 3  Discussed with cardiology, plan for coronary angiogram 2/19  Change antibiotics to ceftriaxone to cover UTI, urine culture with Klebsiella oxytoca

## 2025-02-19 NOTE — ASSESSMENT & PLAN NOTE
Hx multivessel CAD. Suspect CP is demand ischemia from CHB over ACS.High risk for LHC with Cr 3.5. Will plan repeat ischemic evlaution after PPM if still symptomatic. EF normal on echo    2/19/25 CP overnight. Troponin increased to 3. Repeat echo stat. High risk for BHUPENDRA with ARF but will discuss repeat cath based on echo and clinical course. Back on ASA/brilinta

## 2025-02-19 NOTE — PROGRESS NOTES
[facility]  Nephrology  Progress Note    Patient Name: Lorena Contreras  MRN: [id number]  Admission Date: 2/15/2025  Hospital Length of Stay: 4 days  Attending Provider: Singh Barbour DO   Primary Care Physician: Jorge Paris MD  Principal Problem:Complete heart block    Subjective:     HPI: 74 yo F with PMHx of DM2, Fibromyalgia, lymphoma s/p chemo, HTN, HLD, CVA, MI, CAD s/p PCIs CKD3b, HFpEF, and anemia presented to  ED via EMS c/o chest pain radiating to the back x 1 day. Vitals on arrival HR 45bpm. Labs on arrival  Cr 3.5, BNP 1115, EKG complet hear block. CXR normal. Pt given asprin and nitro by ems prior to arrival.     Nephrology consulted for KYA on CKD 3b    Prior records obtained and reviewed. Baseline Cr 2, last at baseline 1/14/25. Cr on arrival 3.5. Pt state she does not have a nephrologist. Pt states she was doing well prior to yesterday. She denies nsaid use, change in uop, n/v.     Interval History: UOP 500ml. Rapid response overnight, pt c/o chest pain and sob, EKG no STEMI. Troponin 1.9 --> 3.3. Pt given lorazepam and placed on 2L of O2 NC. Pt continues to have chest pain this am. Cardiology following. Pt NPO plan for [facility] today.    Review of patient's allergies indicates:   Allergen Reactions    Novolin 70/30 (semi-synthetic) Nausea And Vomiting     Severe vomiting on Relion 70/30    Sulfa (sulfonamide antibiotics) Anaphylaxis    Talwin [pentazocine lactate] Anaphylaxis    Victoza [liraglutide] Nausea And Vomiting    Glipizide Nausea Only    Codeine     Influenza virus vaccines Hives    Iodine and iodide containing products Hives    Levetiracetam Itching    Lyrica [pregabalin] Hallucinations    Neurontin [gabapentin]      Possible associated myoclonic jerk    Rituxan [rituximab] Hives    Zoloft [sertraline] Nausea And Vomiting     Current Facility-Administered Medications   Medication Frequency    0.9% NaCl infusion Continuous    0.9% NaCl infusion Continuous    acetaminophen  What can I expect after the procedure?  Follow these instructions at home:  Injection site care  1. You may remove the bandage (dressing) after 24 hours.  2. Check your injection site every day for signs of infection. Check for:  ? Redness, swelling, or pain.  ? Fluid or blood.  ? Warmth.  ? Pus or a bad smell.  Managing pain, stiffness, and swelling  1. For 24 hours after the procedure:  ? Avoid using heat on the injection site.  ? Do not take baths, swim, or use a hot tub. You may take a shower.   2. If directed, put ice on the injection site. To do this:     3.    ? Put ice in a plastic bag.  ? Place a towel between your skin and the bag.  ? Leave the ice on for 20 minutes, 2-3 times a day.     Activity  · NO DRIVING FOR THE REST OF THE DAY IF receiving a sedative during your procedure.  · Return to your normal activities the next day as tolerated.  General instructions  · The injection site may feel sore.  · Take over-the-counter medications as directed on packaging and prescription medicines only as told by your health care provider who prescribes these.  · Keep all follow-up visits as told by your health care provider.   Contact a health care provider if:  1. You have any of these signs of infection:  ? Redness, swelling, or pain around your injection site.  ? Fluid or blood coming from your injection site.  ? Warmth coming from your injection site.  ? Pus or a bad smell coming from your injection site.  ? A fever.  2. You continue to have pain and soreness around the injection site, even after taking over-the-counter pain medicine.  3. You have severe, sudden, or lasting nausea or vomiting.  Get help right away if:  · You have severe pain at the injection site that is not relieved by medicines.  · You develop a severe headache or a stiff neck.  · You become sensitive to light.  · You have any new numbness or weakness in your legs or arms.  · You lose control of your bladder or bowel movements.  · You have  tablet 650 mg Q4H PRN    albuterol-ipratropium 2.5 mg-0.5 mg/3 mL nebulizer solution 3 mL Q4H PRN    aspirin EC tablet 81 mg Daily    atorvastatin tablet 80 mg QHS    cefTRIAXone injection 2 g Q24H    dextrose 50% injection 12.5 g PRN    dextrose 50% injection 25 g PRN    fentaNYL 50 mcg/mL injection PRN    FLUoxetine capsule 40 mg Daily    furosemide tablet 40 mg BID    glucagon (human recombinant) injection 1 mg PRN    glucose chewable tablet 16 g PRN    glucose chewable tablet 24 g PRN    hydrALAZINE injection 10 mg Q4H PRN    hydrALAZINE tablet 100 mg Q8H    HYDROcodone-acetaminophen 5-325 mg per tablet 1 tablet Q4H PRN    HYDROmorphone (PF) injection 1 mg Q6H PRN    hydrOXYzine pamoate capsule 50 mg Q8H PRN    insulin aspart U-100 pen 0-10 Units QID (AC + HS) PRN    insulin aspart U-100 pen 8 Units TIDWM    insulin glargine U-100 (Lantus) pen 10 Units QHS    iohexoL (OMNIPAQUE 350) injection PRN    isosorbide mononitrate 24 hr tablet 60 mg Daily    LIDOcaine (PF) 10 mg/ml (1%) injection PRN    LORazepam tablet 1 mg Daily PRN    melatonin tablet 6 mg Nightly PRN    metoprolol tartrate (LOPRESSOR) split tablet 25 mg BID    mupirocin 2 % ointment BID    nitroGLYCERIN SL tablet 0.4 mg Q5 Min PRN    ondansetron disintegrating tablet 8 mg Q8H PRN    oxyCODONE immediate release tablet 10 mg Q4H PRN    pantoprazole EC tablet 40 mg Daily    pneumoc 20-michael conj-dip cr(PF) (PREVNAR-20 (PF)) injection Syrg 0.5 mL vaccine x 1 dose    simethicone chewable tablet 80 mg TID PRN    sodium chloride 0.9% flush 10 mL PRN    sodium chloride 0.9% flush 10 mL PRN    ticagrelor tablet 60 mg BID       Objective:     Vital Signs (Most Recent):  Temp: 98.9 °F (37.2 °C) (02/19/25 1121)  Pulse: 104 (02/19/25 1121)  Resp: 19 (02/19/25 1121)  BP: 108/67 (02/19/25 1121)  SpO2: (!) 94 % (02/19/25 1121) Vital Signs (24h Range):  Temp:  [97.8 °F (36.6 °C)-99.2 °F (37.3 °C)] 98.9 °F (37.2 °C)  Pulse:  [] 104  Resp:  [18-20] 19  SpO2:  [94  trouble breathing.  Summary  · An epidural steroid block is an injection of steroid medication and numbing medicine that is given into the epidural space.  · This injection helps relieve pain caused by an irritated or swollen nerve root.   %-98 %] 94 %  BP: (101-113)/(50-67) 108/67     Weight: 85.9 kg (189 lb 6 oz) (02/17/25 1616)  Body mass index is 27.97 kg/m².  Body surface area is 2.04 meters squared.    I/O last 3 completed shifts:  In: 480 [P.O.:480]  Out: 500 [Urine:500]     Physical Exam  Vitals and nursing note reviewed.   HENT:      Head: Normocephalic.      Nose: Nose normal.      Mouth/Throat:      Mouth: Mucous membranes are moist.   Eyes:      Extraocular Movements: Extraocular movements intact.      Conjunctiva/sclera: Conjunctivae normal.      Pupils: Pupils are equal, round, and reactive to light.   Cardiovascular:      Rate and Rhythm: Normal rate.      Comments: + left chest wall pacemaker  Pulmonary:      Effort: Pulmonary effort is normal.      Breath sounds: Normal breath sounds.   Abdominal:      Palpations: Abdomen is soft.   Musculoskeletal:         General: Normal range of motion.      Cervical back: Normal range of motion.      Right lower leg: No edema.      Left lower leg: No edema.   Skin:     General: Skin is warm.   Neurological:      General: No focal deficit present.      Mental Status: She is alert.   Psychiatric:         Mood and Affect: Mood normal.          Significant Labs:  CBC:   Recent Labs   Lab 02/19/25  0127   WBC 12.22   RBC 2.79*   HGB 8.5*   HCT 25.6*      MCV 92   MCH 30.5   MCHC 33.2     CMP:   Recent Labs   Lab 02/15/25  0611 02/16/25  1114 02/17/25  1200 02/18/25  0726 02/19/25  0127   *   < > 212*   < > 95   CALCIUM 8.8   < > 8.7   < > 8.6*   ALBUMIN 3.3*   < > 3.1*  --   --    PROT 7.4  --   --   --   --       < > 133*   < > 137   K 4.3   < > 5.0   < > 4.3   CO2 15*   < > 13*   < > 19*      < > 106   < > 105   BUN 83*   < > 100*   < > 101*   CREATININE 3.5*   < > 4.1*   < > 3.8*   ALKPHOS 217*  --   --   --   --    ALT 42  --   --   --   --    AST 44*  --   --   --   --    BILITOT 0.2  --   --   --   --     < > = values in this interval not displayed.     All labs within  the past 24 hours have been reviewed.     Significant Imaging:  Labs: Reviewed    Assessment/Plan:     Renal/  Acute kidney injury superimposed on stage 3b chronic kidney disease  Baseline creatinine 2.0  On presentation creatinine 3.5    KYA due to cardiogenic shock secondary to complete heart block  2/17 pacemaker placed  2/19 KYA improving Cr 4.1 -->3.8    Plan/Recommendation  No acute indication for dialysis, IVF or diuretics at this time,   Continue to monitor kidney function and UOP     -Keep MAP > 65  -Keep hemoglobin > 7  -Strict ins and outs  -Avoid nephrotoxic agents if possible and renally dose medications  -Avoid drastic hemodynamic changes if possible    Hyponatremia - likely due to KYA,  Na 137 improved. continue to monitor  Metabolic acidosis - due to KYA, continue to monitor. CO2 15--> 19 improving            Thank you for your consult. I will follow-up with patient. Please contact us if you have any additional questions.    ARMIDA Vickers  Nephrology  [facility] [place] [facility]

## 2025-02-19 NOTE — NURSING
[facility]  Nurses Note -- 4 Eyes      2/18/2025     Blanchable redness noted to left heel    Skin assessed on: Q Shift      [x] No Pressure Injuries Present    [x]Prevention Measures Documented    [] Yes LDA  for Pressure Injury Previously documented     [] Yes New Pressure Injury Discovered   [] LDA for New Pressure Injury Added      Attending RN:  Marie Beyer RN     Second RN:  FRANCIS Giles

## 2025-02-19 NOTE — NURSING
While administering Sodium Bicarbonate 150meq/150ml pt lost IV access to both sites. I was able to administer 1 out of 3 vials (50mls). The remainder 2 vials were given to FRANCIS Zavala to give once IV access is gained.

## 2025-02-19 NOTE — ASSESSMENT & PLAN NOTE
Hyponatremia is likely due to renal insufficiency. The patient's most recent sodium results are listed below.  Recent Labs     02/17/25  1200 02/18/25  0726 02/19/25  0127   * 133* 137       Plan  - nephrology consulted  - Monitor sodium Daily.   - Patient hyponatremia is stable  - monitor closely

## 2025-02-19 NOTE — NURSING
RAPID RESPONSE NURSE PROACTIVE ROUNDING NOTE       Time of Visit: 0050    Admit Date: 2/15/2025  LOS: 4  Code Status: Full Code   Date of Visit: 2025  : 1950  Age: 74 y.o.  Sex: female  Race: White  Bed: W418/W418 A:   MRN: [id number]  Was the patient discharged from an ICU this admission? Yes   Was the patient discharged from a PACU within last 24 hours? No   Did the patient receive conscious sedation/general anesthesia in last 24 hours? No   Was the patient in the ED within the past 24 hours? No   Was the patient on NIPPV within the past 24 hours? No   Attending Physician: Singh Barbour DO  Primary Service: Hospitalist   Time spent at the bedside: < 15 min    SITUATION    Notified by  Bedside RN during rounding  Reason for alert: Chest pain    Diagnosis: Complete heart block   has a past medical history of Age-related osteoporosis with current pathological fracture with routine healing, Allergy, Altered mental status, Anemia, Anxiety, Arthritis, Cataract, Colon polyps, Coronary artery disease, Depression, Diabetes mellitus, type II, Disorder of kidney and ureter, Epilepsia partialis continua, Fibromyalgia, Follicular lymphoma, GERD (gastroesophageal reflux disease), HTN (hypertension), Hyperlipidemia, MI (myocardial infarction), Personal history of colonic polyps, Restless leg syndrome, and Stroke.    Last Vitals:  Temp: 98.7 °F (37.1 °C) ( 230)  Pulse: 102 ( 0258)  Resp: 19 ( 0159)  BP: 111/55 ( 0104)  SpO2: 96 % (2302)    24 Hour Vitals Range:  Temp:  [97.2 °F (36.2 °C)-98.7 °F (37.1 °C)]   Pulse:  []   Resp:  [18-20]   BP: (101-123)/(50-71)   SpO2:  [95 %-98 %]     Clinical Issues: Circulatory    ASSESSMENT/INTERVENTIONS    Patient observered at the bedside, chest pain 8/10 increase in pain with inspiration. Tremor noted, patient stated she is very anxious.     RECOMMENDATIONS  Chest xray, incentive spirometer, troponin      Discussed plan of care with Bedside  RNSally    PROVIDER ESCALATION    Physician escalation: Yes    Orders received and case discussed with Dr. Gonzalez .    Disposition:Remain in room 418    FOLLOW UP    Call back the Rapid Response NurseTarsha at [phone] for additional questions or concerns.

## 2025-02-20 LAB
ANION GAP SERPL CALC-SCNC: 13 MMOL/L (ref 8–16)
BASOPHILS # BLD AUTO: 0.04 K/UL (ref 0–0.2)
BASOPHILS NFR BLD: 0.3 % (ref 0–1.9)
BUN SERPL-MCNC: 107 MG/DL (ref 8–23)
CALCIUM SERPL-MCNC: 8.7 MG/DL (ref 8.7–10.5)
CHLORIDE SERPL-SCNC: 103 MMOL/L (ref 95–110)
CO2 SERPL-SCNC: 16 MMOL/L (ref 23–29)
CREAT SERPL-MCNC: 4.1 MG/DL (ref 0.5–1.4)
DIFFERENTIAL METHOD BLD: ABNORMAL
EOSINOPHIL # BLD AUTO: 0 K/UL (ref 0–0.5)
EOSINOPHIL NFR BLD: 0.2 % (ref 0–8)
ERYTHROCYTE [DISTWIDTH] IN BLOOD BY AUTOMATED COUNT: 15.8 % (ref 11.5–14.5)
EST. GFR  (NO RACE VARIABLE): 11 ML/MIN/1.73 M^2
GLUCOSE SERPL-MCNC: 151 MG/DL (ref 70–110)
HCT VFR BLD AUTO: 27.4 % (ref 37–48.5)
HGB BLD-MCNC: 8.5 G/DL (ref 12–16)
IMM GRANULOCYTES # BLD AUTO: 0.06 K/UL (ref 0–0.04)
IMM GRANULOCYTES NFR BLD AUTO: 0.5 % (ref 0–0.5)
LYMPHOCYTES # BLD AUTO: 1.1 K/UL (ref 1–4.8)
LYMPHOCYTES NFR BLD: 9.2 % (ref 18–48)
MCH RBC QN AUTO: 29.2 PG (ref 27–31)
MCHC RBC AUTO-ENTMCNC: 31 G/DL (ref 32–36)
MCV RBC AUTO: 94 FL (ref 82–98)
MONOCYTES # BLD AUTO: 1 K/UL (ref 0.3–1)
MONOCYTES NFR BLD: 8.4 % (ref 4–15)
NEUTROPHILS # BLD AUTO: 9.9 K/UL (ref 1.8–7.7)
NEUTROPHILS NFR BLD: 81.4 % (ref 38–73)
NRBC BLD-RTO: 0 /100 WBC
PLATELET # BLD AUTO: 206 K/UL (ref 150–450)
PMV BLD AUTO: 12.9 FL (ref 9.2–12.9)
POCT GLUCOSE: 102 MG/DL (ref 70–110)
POCT GLUCOSE: 143 MG/DL (ref 70–110)
POCT GLUCOSE: 171 MG/DL (ref 70–110)
POCT GLUCOSE: 204 MG/DL (ref 70–110)
POCT GLUCOSE: 205 MG/DL (ref 70–110)
POCT GLUCOSE: 65 MG/DL (ref 70–110)
POTASSIUM SERPL-SCNC: 4.6 MMOL/L (ref 3.5–5.1)
RBC # BLD AUTO: 2.91 M/UL (ref 4–5.4)
SODIUM SERPL-SCNC: 132 MMOL/L (ref 136–145)
WBC # BLD AUTO: 12.11 K/UL (ref 3.9–12.7)

## 2025-02-20 PROCEDURE — 97535 SELF CARE MNGMENT TRAINING: CPT | Mod: HCNC

## 2025-02-20 PROCEDURE — 36415 COLL VENOUS BLD VENIPUNCTURE: CPT | Mod: HCNC | Performed by: STUDENT IN AN ORGANIZED HEALTH CARE EDUCATION/TRAINING PROGRAM

## 2025-02-20 PROCEDURE — 25000003 PHARM REV CODE 250: Mod: HCNC | Performed by: INTERNAL MEDICINE

## 2025-02-20 PROCEDURE — 25000242 PHARM REV CODE 250 ALT 637 W/ HCPCS: Mod: HCNC | Performed by: INTERNAL MEDICINE

## 2025-02-20 PROCEDURE — 97116 GAIT TRAINING THERAPY: CPT | Mod: HCNC,CQ

## 2025-02-20 PROCEDURE — 97530 THERAPEUTIC ACTIVITIES: CPT | Mod: HCNC

## 2025-02-20 PROCEDURE — 97530 THERAPEUTIC ACTIVITIES: CPT | Mod: HCNC,CQ

## 2025-02-20 PROCEDURE — 94761 N-INVAS EAR/PLS OXIMETRY MLT: CPT | Mod: HCNC

## 2025-02-20 PROCEDURE — 25000003 PHARM REV CODE 250: Mod: HCNC

## 2025-02-20 PROCEDURE — 63600175 PHARM REV CODE 636 W HCPCS: Mod: HCNC | Performed by: STUDENT IN AN ORGANIZED HEALTH CARE EDUCATION/TRAINING PROGRAM

## 2025-02-20 PROCEDURE — 25000003 PHARM REV CODE 250: Mod: HCNC | Performed by: EMERGENCY MEDICINE

## 2025-02-20 PROCEDURE — 99233 SBSQ HOSP IP/OBS HIGH 50: CPT | Mod: 57,HCNC,, | Performed by: INTERNAL MEDICINE

## 2025-02-20 PROCEDURE — 25000003 PHARM REV CODE 250: Mod: HCNC | Performed by: HOSPITALIST

## 2025-02-20 PROCEDURE — 80048 BASIC METABOLIC PNL TOTAL CA: CPT | Mod: HCNC | Performed by: STUDENT IN AN ORGANIZED HEALTH CARE EDUCATION/TRAINING PROGRAM

## 2025-02-20 PROCEDURE — 99900035 HC TECH TIME PER 15 MIN (STAT): Mod: HCNC

## 2025-02-20 PROCEDURE — 21400001 HC TELEMETRY ROOM: Mod: HCNC

## 2025-02-20 PROCEDURE — 85025 COMPLETE CBC W/AUTO DIFF WBC: CPT | Mod: HCNC | Performed by: STUDENT IN AN ORGANIZED HEALTH CARE EDUCATION/TRAINING PROGRAM

## 2025-02-20 RX ORDER — SODIUM CHLORIDE, SODIUM LACTATE, POTASSIUM CHLORIDE, CALCIUM CHLORIDE 600; 310; 30; 20 MG/100ML; MG/100ML; MG/100ML; MG/100ML
INJECTION, SOLUTION INTRAVENOUS CONTINUOUS
Status: ACTIVE | OUTPATIENT
Start: 2025-02-20 | End: 2025-02-21

## 2025-02-20 RX ADMIN — FUROSEMIDE 40 MG: 40 TABLET ORAL at 10:02

## 2025-02-20 RX ADMIN — HYDROCODONE BITARTRATE AND ACETAMINOPHEN 1 TABLET: 5; 325 TABLET ORAL at 05:02

## 2025-02-20 RX ADMIN — ASPIRIN 81 MG: 81 TABLET, COATED ORAL at 08:02

## 2025-02-20 RX ADMIN — ATORVASTATIN CALCIUM 80 MG: 40 TABLET, FILM COATED ORAL at 10:02

## 2025-02-20 RX ADMIN — METOPROLOL TARTRATE 25 MG: 25 TABLET, FILM COATED ORAL at 08:02

## 2025-02-20 RX ADMIN — HYDROCODONE BITARTRATE AND ACETAMINOPHEN 1 TABLET: 5; 325 TABLET ORAL at 10:02

## 2025-02-20 RX ADMIN — INSULIN ASPART 8 UNITS: 100 INJECTION, SOLUTION INTRAVENOUS; SUBCUTANEOUS at 11:02

## 2025-02-20 RX ADMIN — PANTOPRAZOLE SODIUM 40 MG: 40 TABLET, DELAYED RELEASE ORAL at 08:02

## 2025-02-20 RX ADMIN — FLUOXETINE HYDROCHLORIDE 40 MG: 20 CAPSULE ORAL at 08:02

## 2025-02-20 RX ADMIN — INSULIN ASPART 4 UNITS: 100 INJECTION, SOLUTION INTRAVENOUS; SUBCUTANEOUS at 08:02

## 2025-02-20 RX ADMIN — TICAGRELOR 60 MG: 60 TABLET ORAL at 08:02

## 2025-02-20 RX ADMIN — INSULIN ASPART 4 UNITS: 100 INJECTION, SOLUTION INTRAVENOUS; SUBCUTANEOUS at 11:02

## 2025-02-20 RX ADMIN — ONDANSETRON 8 MG: 8 TABLET, ORALLY DISINTEGRATING ORAL at 05:02

## 2025-02-20 RX ADMIN — Medication 16 G: at 10:02

## 2025-02-20 RX ADMIN — LORAZEPAM 1 MG: 0.5 TABLET ORAL at 07:02

## 2025-02-20 RX ADMIN — SODIUM CHLORIDE, POTASSIUM CHLORIDE, SODIUM LACTATE AND CALCIUM CHLORIDE: 600; 310; 30; 20 INJECTION, SOLUTION INTRAVENOUS at 10:02

## 2025-02-20 RX ADMIN — INSULIN ASPART 8 UNITS: 100 INJECTION, SOLUTION INTRAVENOUS; SUBCUTANEOUS at 08:02

## 2025-02-20 RX ADMIN — CEFTRIAXONE 2 G: 2 INJECTION, POWDER, FOR SOLUTION INTRAMUSCULAR; INTRAVENOUS at 10:02

## 2025-02-20 RX ADMIN — TICAGRELOR 60 MG: 60 TABLET ORAL at 10:02

## 2025-02-20 RX ADMIN — ONDANSETRON 8 MG: 8 TABLET, ORALLY DISINTEGRATING ORAL at 01:02

## 2025-02-20 RX ADMIN — FUROSEMIDE 40 MG: 40 TABLET ORAL at 08:02

## 2025-02-20 RX ADMIN — HYDRALAZINE HYDROCHLORIDE 100 MG: 25 TABLET ORAL at 05:02

## 2025-02-20 RX ADMIN — ISOSORBIDE MONONITRATE 60 MG: 30 TABLET, EXTENDED RELEASE ORAL at 08:02

## 2025-02-20 RX ADMIN — INSULIN ASPART 8 UNITS: 100 INJECTION, SOLUTION INTRAVENOUS; SUBCUTANEOUS at 05:02

## 2025-02-20 RX ADMIN — MUPIROCIN: 20 OINTMENT TOPICAL at 08:02

## 2025-02-20 NOTE — PROGRESS NOTES
[facility] Medicine  Progress Note    Patient Name: Lorena Contreras  MRN: [id number]  Patient Class: IP- Inpatient   Admission Date: 2/15/2025  Length of Stay: 5 days  Attending Physician: Singh Barbour DO  Primary Care Provider: Jorge Paris MD        Subjective     Principal Problem:Complete heart block        HPI:  A pleasant  72 yo F with PMHx of  DM2, Fibromyalgia, lymphoma s/p chemo, HTN, HLD, CVA, MI, CAD s/p PCIs  CKD3b, HFpEF, and anemia who presented to the ED with a chief complaint of chest pain radiating to the back with onset a day before presentation.    Pain was said to be sudden in onset; no associated nausea or diaphoresis.  No Preceding PND orthopnea or leg swelling; pain was described as different from her previous coronary events.  Patient however endorsed dizziness but this has been going on for some time.  She denied any syncopal event  Symptoms progress and this prompted patient to come to the ED    Retrospective chart review indicates multiple ED visits for observation for atypical chest pain.  Cardiac history is significant for CAD with JESIKA x 2 to RCA 3/29/19 - JESIKA to RI and Cx 1/8/20      On presentation to the ED; patient was bradycardic to 32 BPM which subsequently improved and mildly hypertensive.  EKG showed complete heart block.  Patient was admitted to the ICU for further management.    Overview/Hospital Course:  73-year-old female with history of diabetes, anxiety,HTN, HLD, CVA, MI, CAD s/p PCIs  CKD3b, HFpEF, and anemia admitted to ICU on 02/15/2025 for complete heart block and KYA.  Cardiology consulted- patient s/p ppm on 02/17.  Patient to start aspirin and Brilinta on 02/18.  Nephrology consulted for KYA on CKD.Patient with chest pain and tachycardia overnight on 02/18. Fount to have elevated troponin 1.9 and peaked at 3.3.  EKG noted and reviewed with NO STEMI and unchanged from previous on 2/17. Device interrogated - normal function. No PMT  Discussed with cardiology. S/p heart catheterization on 02/19, [facility] with patent stents and moderate LAD/RCA disease - no culprit identified . Continue medical Rx per cardiology.     Interval History:  No acute overnight events.  Patient remained afebrile.  From outside of the room, patient Appear calm and in no acute distress, laying in the bed.  However, when nurse or I enter the room and asked how she is doing, she starts crying and states she she does not feel good.  Complaining of neck pain and body aches.  No shortness a breath or chest pain at this time.    Review of Systems   Constitutional:  Negative for fatigue and fever.   Eyes:  Negative for visual disturbance.   Respiratory:  Negative for cough, chest tightness and shortness of breath.    Cardiovascular:  Negative for chest pain and palpitations.   Gastrointestinal:  Negative for abdominal pain, nausea and vomiting.   Genitourinary:  Negative for difficulty urinating, dysuria, frequency and urgency.   Musculoskeletal:  Positive for neck pain.   Neurological:  Negative for dizziness and headaches.   Psychiatric/Behavioral:  Negative for confusion. The patient is nervous/anxious.      Objective:     Vital Signs (Most Recent):  Temp: 97.7 °F (36.5 °C) (02/20/25 1117)  Pulse: 72 (02/20/25 1117)  Resp: 18 (02/20/25 1117)  BP: (!) 93/51 (02/20/25 1117)  SpO2: 99 % (02/20/25 1117) Vital Signs (24h Range):  Temp:  [97.4 °F (36.3 °C)-98 °F (36.7 °C)] 97.7 °F (36.5 °C)  Pulse:  [64-89] 72  Resp:  [17-28] 18  SpO2:  [92 %-99 %] 99 %  BP: ()/(50-65) 93/51     Weight: 85.9 kg (189 lb 6 oz)  Body mass index is 27.97 kg/m².    Intake/Output Summary (Last 24 hours) at 2/20/2025 1158  Last data filed at 2/20/2025 0825  Gross per 24 hour   Intake 120 ml   Output 0 ml   Net 120 ml         Physical Exam  Vitals and nursing note reviewed.   Constitutional:       General: She is not in acute distress.     Appearance: She is ill-appearing.   HENT:      Mouth/Throat:       Mouth: Mucous membranes are moist.   Cardiovascular:      Rate and Rhythm: Normal rate and regular rhythm.      Pulses: Normal pulses.   Pulmonary:      Effort: Pulmonary effort is normal. No respiratory distress.      Breath sounds: Normal breath sounds. No wheezing or rales.      Comments: On room air.  Clean surgical dressing on left anterior chest wall  Abdominal:      General: There is no distension.      Palpations: Abdomen is soft.      Tenderness: There is no abdominal tenderness.   Musculoskeletal:      Cervical back: No tenderness.      Right lower leg: No edema.      Left lower leg: No edema.   Neurological:      General: No focal deficit present.      Mental Status: She is alert and oriented to person, place, and time.   Psychiatric:         Mood and Affect: Mood is anxious.      Comments: Patient very anxious on exam             Significant Labs: All pertinent labs within the past 24 hours have been reviewed.    Significant Imaging: I have reviewed all pertinent imaging results/findings within the past 24 hours.    Assessment and Plan     * Complete heart block  -Hx of chest pain on presentation; different from previous anginal pains  -EKG showed complete heart block  -Discontinue all AV frances blocking agents  -Monitor patient on telemetry  -S/p [employer] PPM on 02/17  -Continue Keflex; arm sling per Cardiology  -Restart aspirin and Brilinta 02/18/25  -Patient with chest pain and tachycardia on 02/19. Troponin peaked at 3  -Discussed with cardiology, S/p heart catheterization on 02/19, [facility] with patent stents and moderate LAD/RCA disease - no culprit identified   -Change antibiotics to ceftriaxone to cover UTI, urine culture with Klebsiella oxytoca      Cardiac pacemaker in situ  -s/p ppm on 02/17.    Acute kidney injury superimposed on stage 3b chronic kidney disease  KYA is likely due to pre-renal azotemia due to intravascular volume depletion. Baseline creatinine is  1.6 to 2.2 . Most recent  creatinine and eGFR are listed below.  Recent Labs     02/18/25  0726 02/19/25  0127 02/20/25  0429   CREATININE 4.1* 3.8* 4.1*   EGFRNORACEVR 11* 12* 11*        Plan  - KYA is stable  - Avoid nephrotoxins and renally dose meds for GFR listed above  - Monitor urine output, serial BMP, and adjust therapy as needed  - nephrology consulted; input appreciated  - monitor closely     Coronary artery disease of native artery of native heart with stable angina pectoris  Patient with known CAD s/p stent placement and CABG, which is controlled Will continue Statin; hold aspirin and Plavix setting of impending pacing and monitor for S/Sx of angina/ACS. Continue to monitor on telemetry.     -repeat ischemic evaluation of after PPM; defer timing to cardiology  -cardiology follow-up outpatient  -patient with chest pain and elevated troponin of 3 on 02/19   -S/p heart catheterization on 02/19, [facility] with patent stents and moderate LAD/RCA disease - no culprit identified     Aortic stenosis  Echocardiogram with evidence of aortic stenosis that is moderate . The patient's most recent echocardiogram result is listed below. We will manage the valvular abnormality by avoiding volume overload; and cardiology follow up as an outpatient    Echo Saline Bubble? No  Result Date: 2/19/2025    Left Ventricle: The left ventricle is normal in size. There is mild   concentric hypertrophy. Septal motion is consistent with pacing. There is   normal systolic function with a visually estimated ejection fraction of 60   - 65%. Grade II diastolic dysfunction.    Right Ventricle: Mild right ventricular enlargement. Systolic function   is normal.    Left Atrium: Left atrium is moderately dilated.    Right Atrium: Right atrium is mildly dilated.    Aortic Valve: There is moderate stenosis. Aortic valve area by VTI is   1.0 cm². Aortic valve peak velocity is 3.2 m/s. Mean gradient is 24 mmHg.   The dimensionless index is 0.33.    Mitral Valve: There is mild  stenosis. The mean pressure gradient across   the mitral valve is 12 mmHg at a heart rate of  bpm. There is mild   regurgitation.    Tricuspid Valve: There is moderate regurgitation.    Pulmonary Artery: The estimated pulmonary artery systolic pressure is   72 mmHg.    IVC/SVC: Intermediate venous pressure at 8 mmHg.    Pericardium: There is a trivial posterior effusion.        Echo  Result Date: 2/15/2025    Left Ventricle: The left ventricle is normal in size. There is moderate   concentric hypertrophy. There is hyperdynamic systolic function with a   visually estimated ejection fraction of 70 - 75%.    Right Ventricle: Normal right ventricular cavity size. Systolic   function is normal.    Left Atrium: Left atrium is moderately dilated.    Right Atrium: Right atrium is mildly dilated.    Aortic Valve: There is moderate stenosis. Aortic valve area by VTI is   0.8 cm². Aortic valve peak velocity is 3.4 m/s. Mean gradient is 27 mmHg.   The dimensionless index is 0.27.    Mitral Valve: There is mild stenosis. The mean pressure gradient across   the mitral valve is 5 mmHg at a heart rate of 42  bpm. There is mild   regurgitation.    Tricuspid Valve: There is moderate regurgitation.    Pulmonary Artery: The estimated pulmonary artery systolic pressure is   49 mmHg.    IVC/SVC: Intermediate venous pressure at 8 mmHg.        Echo  Result Date: 10/31/2024    Left Ventricle: The left ventricle is normal in size. Normal wall   thickness. Normal wall motion. There is normal systolic function with a   visually estimated ejection fraction of 60 - 65%. Grade II diastolic   dysfunction. Elevated left ventricular filling pressure.    Right Ventricle: Normal right ventricular cavity size. Wall thickness   is normal. Systolic function is normal.    Left Atrium: Left atrium is severely dilated.    Aortic Valve: There is moderate aortic valve sclerosis. Moderately   restricted motion. There is moderate stenosis. Aortic valve area by  VTI is   1.1 cm2. Aortic valve peak velocity is 3.1 m/s. Mean gradient is 20.1   mmHg. The dimensionless index is 0.37.    Mitral Valve: There is mild regurgitation.    Tricuspid Valve: There is mild regurgitation.    Pulmonary Artery: There is pulmonary hypertension. The estimated   pulmonary artery systolic pressure is 46 mmHg.    IVC/SVC: Normal venous pressure at 3 mmHg.           Mixed hyperlipidemia  Continue home statin      Diabetes mellitus  Patient's FSGs are controlled on current medication regimen.  Last A1c reviewed-   Lab Results   Component Value Date    HGBA1C 5.9 (H) 01/14/2025     Most recent fingerstick glucose reviewed-   Recent Labs   Lab 02/19/25  1620 02/19/25  1955 02/20/25  0805 02/20/25  1120   POCTGLUCOSE 188* 224* 205* 204*       Current correctional scale  Medium  Maintain anti-hyperglycemic dose as follows-   Antihyperglycemics (From admission, onward)      Start     Stop Route Frequency Ordered    02/19/25 2100  insulin glargine U-100 (Lantus) pen 10 Units         -- SubQ Nightly 02/19/25 0737    02/18/25 1645  insulin aspart U-100 pen 8 Units         -- SubQ 3 times daily with meals 02/18/25 1428    02/15/25 1358  insulin aspart U-100 pen 0-10 Units         -- SubQ Before meals & nightly PRN 02/15/25 1258          Hold Oral hypoglycemics while patient is in the hospital.; cardiac consistent carbohydrate diet; monitor blood glucose log and titrate to effect  Prandial aspart added    Hyponatremia  Hyponatremia is likely due to renal insufficiency. The patient's most recent sodium results are listed below.  Recent Labs     02/18/25  0726 02/19/25  0127 02/20/25  0429   * 137 132*       Plan  - nephrology consulted  - Monitor sodium Daily.   - Patient hyponatremia is stable  - monitor closely    Anxiety  -hydroxyzine p.r.n. every 8 hours for anxiety      Stage 3b chronic kidney disease  Creatine stable for now. BMP reviewed- noted Estimated Creatinine Clearance: 15.2 mL/min (A)  (based on SCr of 3.8 mg/dL (H)). according to latest data. Based on current GFR, CKD stage is stage 4 - GFR 15-29.  Monitor UOP and serial BMP and adjust therapy as needed. Renally dose meds. Avoid nephrotoxic medications and procedures.      VTE Risk Mitigation (From admission, onward)           Ordered     IP VTE HIGH RISK PATIENT  Once         02/15/25 1252     Place sequential compression device  Until discontinued         02/15/25 1252     Place sequential compression device  Until discontinued         02/15/25 0802                    Discharge Planning   MIKHAIL: 2/21/2025     Code Status: Full Code   Medical Readiness for Discharge Date:   Discharge Plan A: Home                Please place Justification for DME        Singh Barbour DO  Department of Hospital Medicine   [facility] [place] - Med Surg

## 2025-02-20 NOTE — PT/OT/SLP PROGRESS
Physical Therapy Treatment    Patient Name:  Lorena Contreras   MRN:  [id number]    Recommendations:     Discharge Recommendations: Low Intensity Therapy (Resume Outpatient PT)  Discharge Equipment Recommendations: none  Barriers to discharge: None    Assessment:     Lorena Contreras is a 74 y.o. female admitted with a medical diagnosis of Complete heart block.  She presents with the following impairments/functional limitations: weakness, gait instability, decreased ROM, impaired endurance, impaired balance, impaired coordination, decreased safety awareness, pain, impaired self care skills, impaired functional mobility, decreased coordination, decreased lower extremity function.    Pt with burning sensation near pacemaker site, nursing made aware. Pt ambulated ~10ft using RW and CGA    Rehab Prognosis: Good; patient would benefit from acute skilled PT services to address these deficits and reach maximum level of function.    Recent Surgery: Procedure(s) (LRB):  Left heart cath (Left) 1 Day Post-Op    Plan:     During this hospitalization, patient to be seen  (3-5x/wk) to address the identified rehab impairments via gait training, therapeutic activities, therapeutic exercises, neuromuscular re-education and progress toward the following goals:    Plan of Care Expires:  02/25/25    Subjective     Chief Complaint: burning sensation near pacemaker site  Patient/Family Comments/goals: Pt agreed to therapy  Pain/Comfort:  Pain Rating 1:  (burning near pacemaker site)  Location - Orientation 1: upper  Location 1: chest      Objective:     Communicated with nursing prior to session.  Patient found HOB elevated with bed alarm, PureWick, peripheral IV, telemetry upon PT entry to room.     General Precautions: Standard, fall, pacemaker  Orthopedic Precautions: N/A  Braces: N/A  Respiratory Status: Room air     Functional Mobility: Pt BP HOB elevated 104/57 HR 81, MAP 77, BP sitting /56  Bed Mobility:     Scooting:  maximal assistance and of 2 persons  Supine to Sit: contact guard assistance  Sit to Supine: contact guard assistance  Transfers: Gait belt donned     Sit to Stand:  moderate assistance and of 2 persons with rolling walker  Chair to Bed: Moderate Assistance x2 persons using stand pivot. Verbal cues for proper hand placement.  Gait: Pt ambulated ~8ft using RW and CGA, pt with decreased janak, step length, flex posture, increased time in double stance. Pt with c/o dizziness during gait training, nursing made aware.  Balance: Pt with Fair- standing balance      AM-PAC 6 CLICK MOBILITY  Turning over in bed (including adjusting bedclothes, sheets and blankets)?: 3  Sitting down on and standing up from a chair with arms (e.g., wheelchair, bedside commode, etc.): 2  Moving from lying on back to sitting on the side of the bed?: 3  Moving to and from a bed to a chair (including a wheelchair)?: 3  Need to walk in hospital room?: 3  Climbing 3-5 steps with a railing?: 2  Basic Mobility Total Score: 16       Treatment & Education:  Pt performed gait training, bed mobility, and transfers as above    Patient left HOB elevated with all lines intact, call button in reach, bed alarm on, and nursing notified..    GOALS:   Multidisciplinary Problems       Physical Therapy Goals          Problem: Physical Therapy    Goal Priority Disciplines Outcome Interventions   Physical Therapy Goal     PT, PT/OT Progressing    Description: Goals to be met by: 25     Patient will increase functional independence with mobility by performin. Supine to sit with Stand-by Assistance  2. Sit to stand transfer with Stand-by Assistance  3. Gait  x 25-50 feet with Stand-by Assistance using Rolling Walker.   4. Ascend/descend 4 stair with right Handrails and Minimal  Assistance .   5. Lower extremity exercise program x10 reps per handout, with supervision                         DME Justifications:      Time Tracking:     PT Received On:  02/20/25  PT Start Time: 1317     PT Stop Time: 1340  PT Total Time (min): 23 min     Billable Minutes: Gait Training 15 and Therapeutic Activity 8    Treatment Type: Treatment  PT/PTA: PTA     Number of PTA visits since last PT visit: 1 02/20/2025

## 2025-02-20 NOTE — ASSESSMENT & PLAN NOTE
Echocardiogram with evidence of aortic stenosis that is moderate . The patient's most recent echocardiogram result is listed below. We will manage the valvular abnormality by avoiding volume overload; and cardiology follow up as an outpatient    Echo Saline Bubble? No  Result Date: 2/19/2025    Left Ventricle: The left ventricle is normal in size. There is mild   concentric hypertrophy. Septal motion is consistent with pacing. There is   normal systolic function with a visually estimated ejection fraction of 60   - 65%. Grade II diastolic dysfunction.    Right Ventricle: Mild right ventricular enlargement. Systolic function   is normal.    Left Atrium: Left atrium is moderately dilated.    Right Atrium: Right atrium is mildly dilated.    Aortic Valve: There is moderate stenosis. Aortic valve area by VTI is   1.0 cm². Aortic valve peak velocity is 3.2 m/s. Mean gradient is 24 mmHg.   The dimensionless index is 0.33.    Mitral Valve: There is mild stenosis. The mean pressure gradient across   the mitral valve is 12 mmHg at a heart rate of  bpm. There is mild   regurgitation.    Tricuspid Valve: There is moderate regurgitation.    Pulmonary Artery: The estimated pulmonary artery systolic pressure is   72 mmHg.    IVC/SVC: Intermediate venous pressure at 8 mmHg.    Pericardium: There is a trivial posterior effusion.        Echo  Result Date: 2/15/2025    Left Ventricle: The left ventricle is normal in size. There is moderate   concentric hypertrophy. There is hyperdynamic systolic function with a   visually estimated ejection fraction of 70 - 75%.    Right Ventricle: Normal right ventricular cavity size. Systolic   function is normal.    Left Atrium: Left atrium is moderately dilated.    Right Atrium: Right atrium is mildly dilated.    Aortic Valve: There is moderate stenosis. Aortic valve area by VTI is   0.8 cm². Aortic valve peak velocity is 3.4 m/s. Mean gradient is 27 mmHg.   The dimensionless index  is 0.27.    Mitral Valve: There is mild stenosis. The mean pressure gradient across   the mitral valve is 5 mmHg at a heart rate of 42  bpm. There is mild   regurgitation.    Tricuspid Valve: There is moderate regurgitation.    Pulmonary Artery: The estimated pulmonary artery systolic pressure is   49 mmHg.    IVC/SVC: Intermediate venous pressure at 8 mmHg.        Echo  Result Date: 10/31/2024    Left Ventricle: The left ventricle is normal in size. Normal wall   thickness. Normal wall motion. There is normal systolic function with a   visually estimated ejection fraction of 60 - 65%. Grade II diastolic   dysfunction. Elevated left ventricular filling pressure.    Right Ventricle: Normal right ventricular cavity size. Wall thickness   is normal. Systolic function is normal.    Left Atrium: Left atrium is severely dilated.    Aortic Valve: There is moderate aortic valve sclerosis. Moderately   restricted motion. There is moderate stenosis. Aortic valve area by VTI is   1.1 cm2. Aortic valve peak velocity is 3.1 m/s. Mean gradient is 20.1   mmHg. The dimensionless index is 0.37.    Mitral Valve: There is mild regurgitation.    Tricuspid Valve: There is mild regurgitation.    Pulmonary Artery: There is pulmonary hypertension. The estimated   pulmonary artery systolic pressure is 46 mmHg.    IVC/SVC: Normal venous pressure at 3 mmHg.

## 2025-02-20 NOTE — CARE UPDATE
RAPID RESPONSE NURSE PROACTIVE ROUNDING NOTE       Time of Visit: 09:30    Admit Date: 2/15/2025  LOS: 4  Code Status: Full Code   Date of Visit: 2025  : 1950  Age: 74 y.o.  Sex: female  Race: White  Bed: W418/W418 A:   MRN: [id number]  Was the patient discharged from an ICU this admission? Yes   Was the patient discharged from a PACU within last 24 hours? No   Did the patient receive conscious sedation/general anesthesia in last 24 hours? No   Was the patient in the ED within the past 24 hours? No   Was the patient on NIPPV within the past 24 hours? No   Attending Physician: Singh Barbour DO  Primary Service: Hospitalist   Time spent at the bedside: 30 - 45 min    SITUATION    Notified by  Dr. Barbour   Reason for alert: Per Dr. Barbour request     Diagnosis: Complete heart block   has a past medical history of Age-related osteoporosis with current pathological fracture with routine healing, Allergy, Altered mental status, Anemia, Anxiety, Arthritis, Cataract, Colon polyps, Coronary artery disease, Depression, Diabetes mellitus, type II, Disorder of kidney and ureter, Epilepsia partialis continua, Fibromyalgia, Follicular lymphoma, GERD (gastroesophageal reflux disease), HTN (hypertension), Hyperlipidemia, MI (myocardial infarction), Personal history of colonic polyps, Restless leg syndrome, and Stroke.    Last Vitals:  Temp: 97.4 °F (36.3 °C) (1612)  Pulse: 72 (1731)  Resp: 18 (1731)  BP: 105/62 (1731)  SpO2: 95 % (1731)    24 Hour Vitals Range:  Temp:  [97.4 °F (36.3 °C)-99.2 °F (37.3 °C)]   Pulse:  []   Resp:  [18-28]   BP: ()/(50-67)   SpO2:  [92 %-97 %]     Clinical Issues: Dysrhythmia    ASSESSMENT/INTERVENTIONS    Up on assessment pt very drowse, but easily awake , patient has a magnet on her pacemaker , but no once know why, later clarified with Dr. Jacky KHALIL stated that ''[employer] coming to reprogram her device due to PMT. Keep magnet in place until they  arrive. Trop elevated 3.374  around 11:00 am pt transported cath lab     RECOMMENDATIONS  Close monitoring     Discussed plan of care with  Dr. Barbour     PROVIDER ESCALATION    Physician escalation: Yes    Orders received and case discussed with Dr. Barbour  .    Disposition:Remain in room 418    FOLLOW UP    Call back the Rapid Response Nurse, [phone] at  for additional questions or concerns.

## 2025-02-20 NOTE — PROGRESS NOTES
[facility]  Nephrology  Progress Note    Patient Name: Lorena Contreras  MRN: [id number]  Admission Date: 2/15/2025  Hospital Length of Stay: 5 days  Attending Provider: Singh Barbour DO   Primary Care Physician: Jorge Paris MD  Principal Problem:Complete heart block    Subjective:     HPI: 74 yo F with PMHx of DM2, Fibromyalgia, lymphoma s/p chemo, HTN, HLD, CVA, MI, CAD s/p PCIs CKD3b, HFpEF, and anemia presented to  ED via EMS c/o chest pain radiating to the back x 1 day. Vitals on arrival HR 45bpm. Labs on arrival  Cr 3.5, BNP 1115, EKG complet hear block. CXR normal. Pt given asprin and nitro by ems prior to arrival.     Nephrology consulted for KYA on CKD 3b    Prior records obtained and reviewed. Baseline Cr 2, last at baseline 1/14/25. Cr on arrival 3.5. Pt state she does not have a nephrologist. Pt states she was doing well prior to yesterday. She denies nsaid use, change in uop, n/v.     Interval History: UOP 500ml. Pt hypotensive yesterday evening BP 95/50. /62 this morning improved. Pt s/p LHC yesterday, continues to have mild chest pain.  She also reports nausea and vomiting this morning.     Review of patient's allergies indicates:   Allergen Reactions    Novolin 70/30 (semi-synthetic) Nausea And Vomiting     Severe vomiting on Relion 70/30    Sulfa (sulfonamide antibiotics) Anaphylaxis    Talwin [pentazocine lactate] Anaphylaxis    Victoza [liraglutide] Nausea And Vomiting    Glipizide Nausea Only    Codeine     Influenza virus vaccines Hives    Iodine and iodide containing products Hives    Levetiracetam Itching    Lyrica [pregabalin] Hallucinations    Neurontin [gabapentin]      Possible associated myoclonic jerk    Rituxan [rituximab] Hives    Zoloft [sertraline] Nausea And Vomiting     Current Facility-Administered Medications   Medication Frequency    acetaminophen tablet 650 mg Q4H PRN    albuterol-ipratropium 2.5 mg-0.5 mg/3 mL nebulizer solution 3 mL Q4H PRN     aspirin EC tablet 81 mg Daily    atorvastatin tablet 80 mg QHS    cefTRIAXone injection 2 g Q24H    dextrose 50% injection 12.5 g PRN    dextrose 50% injection 25 g PRN    FLUoxetine capsule 40 mg Daily    furosemide tablet 40 mg BID    glucagon (human recombinant) injection 1 mg PRN    glucose chewable tablet 16 g PRN    glucose chewable tablet 24 g PRN    hydrALAZINE injection 10 mg Q4H PRN    hydrALAZINE tablet 100 mg Q8H    HYDROcodone-acetaminophen 5-325 mg per tablet 1 tablet Q4H PRN    HYDROmorphone (PF) injection 1 mg Q6H PRN    hydrOXYzine pamoate capsule 50 mg Q8H PRN    insulin aspart U-100 pen 0-10 Units QID (AC + HS) PRN    insulin aspart U-100 pen 8 Units TIDWM    insulin glargine U-100 (Lantus) pen 10 Units QHS    isosorbide mononitrate 24 hr tablet 60 mg Daily    lactated ringers infusion Continuous    LORazepam tablet 1 mg Daily PRN    melatonin tablet 6 mg Nightly PRN    metoprolol tartrate (LOPRESSOR) tablet 25 mg BID    nitroGLYCERIN SL tablet 0.4 mg Q5 Min PRN    ondansetron disintegrating tablet 8 mg Q8H PRN    oxyCODONE immediate release tablet 10 mg Q4H PRN    pantoprazole EC tablet 40 mg Daily    pneumoc 20-michael conj-dip cr(PF) (PREVNAR-20 (PF)) injection Syrg 0.5 mL vaccine x 1 dose    simethicone chewable tablet 80 mg TID PRN    sodium chloride 0.9% flush 10 mL PRN    sodium chloride 0.9% flush 10 mL PRN    ticagrelor tablet 60 mg BID       Objective:     Vital Signs (Most Recent):  Temp: 97.6 °F (36.4 °C) (02/20/25 1202)  Pulse: 72 (02/20/25 1202)  Resp: 15 (02/20/25 1202)  BP: (!) 115/54 (02/20/25 1202)  SpO2: 99 % (02/20/25 1202) Vital Signs (24h Range):  Temp:  [97.4 °F (36.3 °C)-98 °F (36.7 °C)] 97.6 °F (36.4 °C)  Pulse:  [64-89] 72  Resp:  [15-27] 15  SpO2:  [93 %-99 %] 99 %  BP: ()/(50-65) 115/54     Weight: 85.9 kg (189 lb 6 oz) (02/17/25 1616)  Body mass index is 27.97 kg/m².  Body surface area is 2.04 meters squared.    I/O last 3 completed shifts:  In: 180 [P.O.:180]  Out:  500 [Urine:500]     Physical Exam  Vitals and nursing note reviewed.   Constitutional:       Appearance: Normal appearance.   HENT:      Head: Normocephalic.      Nose: Nose normal.      Mouth/Throat:      Mouth: Mucous membranes are moist.   Eyes:      Extraocular Movements: Extraocular movements intact.      Conjunctiva/sclera: Conjunctivae normal.      Pupils: Pupils are equal, round, and reactive to light.   Cardiovascular:      Rate and Rhythm: Normal rate.      Pulses: Normal pulses.      Heart sounds: Normal heart sounds.   Pulmonary:      Breath sounds: Normal breath sounds.   Abdominal:      Palpations: Abdomen is soft.   Musculoskeletal:      Cervical back: Normal range of motion.      Right lower leg: No edema.      Left lower leg: No edema.   Skin:     General: Skin is warm.   Neurological:      General: No focal deficit present.      Mental Status: She is alert.   Psychiatric:         Mood and Affect: Mood normal.          Significant Labs:  CBC:   Recent Labs   Lab 02/20/25  0429   WBC 12.11   RBC 2.91*   HGB 8.5*   HCT 27.4*      MCV 94   MCH 29.2   MCHC 31.0*     CMP:   Recent Labs   Lab 02/15/25  0611 02/16/25  1114 02/17/25  1200 02/18/25  0726 02/20/25  0429   *   < > 212*   < > 151*   CALCIUM 8.8   < > 8.7   < > 8.7   ALBUMIN 3.3*   < > 3.1*  --   --    PROT 7.4  --   --   --   --       < > 133*   < > 132*   K 4.3   < > 5.0   < > 4.6   CO2 15*   < > 13*   < > 16*      < > 106   < > 103   BUN 83*   < > 100*   < > 107*   CREATININE 3.5*   < > 4.1*   < > 4.1*   ALKPHOS 217*  --   --   --   --    ALT 42  --   --   --   --    AST 44*  --   --   --   --    BILITOT 0.2  --   --   --   --     < > = values in this interval not displayed.     All labs within the past 24 hours have been reviewed.     Significant Imaging:  Labs: Reviewed    Assessment/Plan:     Renal/  Acute kidney injury superimposed on stage 3b chronic kidney disease  Baseline creatinine 2.0  On presentation  creatinine 3.5    KYA due to cardiogenic shock secondary to complete heart block  2/17 pacemaker placed  2/19 KYA improving Cr 3.8,   2/20 Cr 4.1, worsened likely due to IVC and hypotension    Plan/Recommendation  - LR 75ml/hr  -No acute indication for dialysis or diuretics  Continue to monitor kidney function and UOP     -Keep MAP > 65  -Keep hemoglobin > 7  -Strict ins and outs  -Avoid nephrotoxic agents if possible and renally dose medications  -Avoid drastic hemodynamic changes if possible    Hyponatremia   - likely due to KYA,    - Na 137 --> 132  worsened,   - IVF above  - continue to monitor    Metabolic acidosis   - due to KYA,   - CO2 15--> 19 --> 16 , worsened  - continue to monitor            Thank you for your consult. I will follow-up with patient. Please contact us if you have any additional questions.    ARMIDA Vickers  Nephrology  [facility] - Med Surg

## 2025-02-20 NOTE — SUBJECTIVE & OBJECTIVE
Interval History: UOP 500ml. Pt hypotensive yesterday evening BP 95/50. /62 this morning improved. Pt s/p LHC yesterday, continues to have mild chest pain.  She also reports nausea and vomiting this morning.     Review of patient's allergies indicates:   Allergen Reactions    Novolin 70/30 (semi-synthetic) Nausea And Vomiting     Severe vomiting on Relion 70/30    Sulfa (sulfonamide antibiotics) Anaphylaxis    Talwin [pentazocine lactate] Anaphylaxis    Victoza [liraglutide] Nausea And Vomiting    Glipizide Nausea Only    Codeine     Influenza virus vaccines Hives    Iodine and iodide containing products Hives    Levetiracetam Itching    Lyrica [pregabalin] Hallucinations    Neurontin [gabapentin]      Possible associated myoclonic jerk    Rituxan [rituximab] Hives    Zoloft [sertraline] Nausea And Vomiting     Current Facility-Administered Medications   Medication Frequency    acetaminophen tablet 650 mg Q4H PRN    albuterol-ipratropium 2.5 mg-0.5 mg/3 mL nebulizer solution 3 mL Q4H PRN    aspirin EC tablet 81 mg Daily    atorvastatin tablet 80 mg QHS    cefTRIAXone injection 2 g Q24H    dextrose 50% injection 12.5 g PRN    dextrose 50% injection 25 g PRN    FLUoxetine capsule 40 mg Daily    furosemide tablet 40 mg BID    glucagon (human recombinant) injection 1 mg PRN    glucose chewable tablet 16 g PRN    glucose chewable tablet 24 g PRN    hydrALAZINE injection 10 mg Q4H PRN    hydrALAZINE tablet 100 mg Q8H    HYDROcodone-acetaminophen 5-325 mg per tablet 1 tablet Q4H PRN    HYDROmorphone (PF) injection 1 mg Q6H PRN    hydrOXYzine pamoate capsule 50 mg Q8H PRN    insulin aspart U-100 pen 0-10 Units QID (AC + HS) PRN    insulin aspart U-100 pen 8 Units TIDWM    insulin glargine U-100 (Lantus) pen 10 Units QHS    isosorbide mononitrate 24 hr tablet 60 mg Daily    lactated ringers infusion Continuous    LORazepam tablet 1 mg Daily PRN    melatonin tablet 6 mg Nightly PRN    metoprolol tartrate (LOPRESSOR)  tablet 25 mg BID    nitroGLYCERIN SL tablet 0.4 mg Q5 Min PRN    ondansetron disintegrating tablet 8 mg Q8H PRN    oxyCODONE immediate release tablet 10 mg Q4H PRN    pantoprazole EC tablet 40 mg Daily    pneumoc 20-michael conj-dip cr(PF) (PREVNAR-20 (PF)) injection Syrg 0.5 mL vaccine x 1 dose    simethicone chewable tablet 80 mg TID PRN    sodium chloride 0.9% flush 10 mL PRN    sodium chloride 0.9% flush 10 mL PRN    ticagrelor tablet 60 mg BID       Objective:     Vital Signs (Most Recent):  Temp: 97.6 °F (36.4 °C) (02/20/25 1202)  Pulse: 72 (02/20/25 1202)  Resp: 15 (02/20/25 1202)  BP: (!) 115/54 (02/20/25 1202)  SpO2: 99 % (02/20/25 1202) Vital Signs (24h Range):  Temp:  [97.4 °F (36.3 °C)-98 °F (36.7 °C)] 97.6 °F (36.4 °C)  Pulse:  [64-89] 72  Resp:  [15-27] 15  SpO2:  [93 %-99 %] 99 %  BP: ()/(50-65) 115/54     Weight: 85.9 kg (189 lb 6 oz) (02/17/25 1616)  Body mass index is 27.97 kg/m².  Body surface area is 2.04 meters squared.    I/O last 3 completed shifts:  In: 180 [P.O.:180]  Out: 500 [Urine:500]     Physical Exam  Vitals and nursing note reviewed.   Constitutional:       Appearance: Normal appearance.   HENT:      Head: Normocephalic.      Nose: Nose normal.      Mouth/Throat:      Mouth: Mucous membranes are moist.   Eyes:      Extraocular Movements: Extraocular movements intact.      Conjunctiva/sclera: Conjunctivae normal.      Pupils: Pupils are equal, round, and reactive to light.   Cardiovascular:      Rate and Rhythm: Normal rate.      Pulses: Normal pulses.      Heart sounds: Normal heart sounds.   Pulmonary:      Breath sounds: Normal breath sounds.   Abdominal:      Palpations: Abdomen is soft.   Musculoskeletal:      Cervical back: Normal range of motion.      Right lower leg: No edema.      Left lower leg: No edema.   Skin:     General: Skin is warm.   Neurological:      General: No focal deficit present.      Mental Status: She is alert.   Psychiatric:         Mood and Affect: Mood  normal.          Significant Labs:  CBC:   Recent Labs   Lab 02/20/25  0429   WBC 12.11   RBC 2.91*   HGB 8.5*   HCT 27.4*      MCV 94   MCH 29.2   MCHC 31.0*     CMP:   Recent Labs   Lab 02/15/25  0611 02/16/25  1114 02/17/25  1200 02/18/25  0726 02/20/25  0429   *   < > 212*   < > 151*   CALCIUM 8.8   < > 8.7   < > 8.7   ALBUMIN 3.3*   < > 3.1*  --   --    PROT 7.4  --   --   --   --       < > 133*   < > 132*   K 4.3   < > 5.0   < > 4.6   CO2 15*   < > 13*   < > 16*      < > 106   < > 103   BUN 83*   < > 100*   < > 107*   CREATININE 3.5*   < > 4.1*   < > 4.1*   ALKPHOS 217*  --   --   --   --    ALT 42  --   --   --   --    AST 44*  --   --   --   --    BILITOT 0.2  --   --   --   --     < > = values in this interval not displayed.     All labs within the past 24 hours have been reviewed.     Significant Imaging:  Labs: Reviewed

## 2025-02-20 NOTE — ASSESSMENT & PLAN NOTE
KYA is likely due to pre-renal azotemia due to intravascular volume depletion. Baseline creatinine is  1.6 to 2.2 . Most recent creatinine and eGFR are listed below.  Recent Labs     02/18/25  0726 02/19/25  0127 02/20/25  0429   CREATININE 4.1* 3.8* 4.1*   EGFRNORACEVR 11* 12* 11*        Plan  - KYA is stable  - Avoid nephrotoxins and renally dose meds for GFR listed above  - Monitor urine output, serial BMP, and adjust therapy as needed  - nephrology consulted; input appreciated  - monitor closely

## 2025-02-20 NOTE — ASSESSMENT & PLAN NOTE
Hyponatremia is likely due to renal insufficiency. The patient's most recent sodium results are listed below.  Recent Labs     02/18/25  0726 02/19/25  0127 02/20/25  0429   * 137 132*       Plan  - nephrology consulted  - Monitor sodium Daily.   - Patient hyponatremia is stable  - monitor closely

## 2025-02-20 NOTE — ASSESSMENT & PLAN NOTE
Hx multivessel CAD. Suspect CP is demand ischemia from CHB over ACS.High risk for LHC with Cr 3.5. Will plan repeat ischemic evlaution after PPM if still symptomatic. EF normal on echo    2/19/25 CP overnight. Troponin increased to 3. Repeat echo stat. High risk for BHUPENDRA with ARF but will discuss repeat cath based on echo and clinical course. Back on ASA/brilinta    2/20/25 LHC with patent stents and moderate LAD/RCA disease - no culprit identified - medical Rx

## 2025-02-20 NOTE — SUBJECTIVE & OBJECTIVE
Interval History:  No acute overnight events.  Patient remained afebrile.  From outside of the room, patient Appear calm and in no acute distress, laying in the bed.  However, when nurse or I enter the room and asked how she is doing, she starts crying and states she she does not feel good.  Complaining of neck pain and body aches.  No shortness a breath or chest pain at this time.    Review of Systems   Constitutional:  Negative for fatigue and fever.   Eyes:  Negative for visual disturbance.   Respiratory:  Negative for cough, chest tightness and shortness of breath.    Cardiovascular:  Negative for chest pain and palpitations.   Gastrointestinal:  Negative for abdominal pain, nausea and vomiting.   Genitourinary:  Negative for difficulty urinating, dysuria, frequency and urgency.   Musculoskeletal:  Positive for neck pain.   Neurological:  Negative for dizziness and headaches.   Psychiatric/Behavioral:  Negative for confusion. The patient is nervous/anxious.      Objective:     Vital Signs (Most Recent):  Temp: 97.7 °F (36.5 °C) (02/20/25 1117)  Pulse: 72 (02/20/25 1117)  Resp: 18 (02/20/25 1117)  BP: (!) 93/51 (02/20/25 1117)  SpO2: 99 % (02/20/25 1117) Vital Signs (24h Range):  Temp:  [97.4 °F (36.3 °C)-98 °F (36.7 °C)] 97.7 °F (36.5 °C)  Pulse:  [64-89] 72  Resp:  [17-28] 18  SpO2:  [92 %-99 %] 99 %  BP: ()/(50-65) 93/51     Weight: 85.9 kg (189 lb 6 oz)  Body mass index is 27.97 kg/m².    Intake/Output Summary (Last 24 hours) at 2/20/2025 1158  Last data filed at 2/20/2025 0825  Gross per 24 hour   Intake 120 ml   Output 0 ml   Net 120 ml         Physical Exam  Vitals and nursing note reviewed.   Constitutional:       General: She is not in acute distress.     Appearance: She is ill-appearing.   HENT:      Mouth/Throat:      Mouth: Mucous membranes are moist.   Cardiovascular:      Rate and Rhythm: Normal rate and regular rhythm.      Pulses: Normal pulses.   Pulmonary:      Effort: Pulmonary effort is  normal. No respiratory distress.      Breath sounds: Normal breath sounds. No wheezing or rales.      Comments: On room air.  Clean surgical dressing on left anterior chest wall  Abdominal:      General: There is no distension.      Palpations: Abdomen is soft.      Tenderness: There is no abdominal tenderness.   Musculoskeletal:      Cervical back: No tenderness.      Right lower leg: No edema.      Left lower leg: No edema.   Neurological:      General: No focal deficit present.      Mental Status: She is alert and oriented to person, place, and time.   Psychiatric:         Mood and Affect: Mood is anxious.      Comments: Patient very anxious on exam             Significant Labs: All pertinent labs within the past 24 hours have been reviewed.    Significant Imaging: I have reviewed all pertinent imaging results/findings within the past 24 hours.

## 2025-02-20 NOTE — AI DETERIORATION ALERT
Artificial Intelligence Notification  [facility] [place] [address]  Phone: [phone]    This documentation was triggered by an Artificial Intelligence Notification:    Admit Date: 2/15/2025   LOS: 5  Code Status: Full Code  : 1950  Age: 74 y.o.  Weight:   Wt Readings from Last 1 Encounters:   25 85.9 kg (189 lb 6 oz)        Sex: female  Bed: [address]  MRN: [id number]  Attending Physician: Singh Barbour DO     Date of Alert: 2025  Time AI Alert Received: 11:32am            Vitals:    25 1117   BP: (!) 93/51   Pulse: 72   Resp: 18   Temp: 97.7 °F (36.5 °C)     SpO2: 99 %      Artificial Intelligence alert discussed with Provider:     Name: Singh Barbour   Date/Time of Provider Notification: 11:32am      Patient Condition:  Stable.  Laying in bed, resting.  Becomes very anxious on exam.  Complaining of neck pain.  No chest pain or shortness for breath at this time.  Asking nurse to repeat vital signs

## 2025-02-20 NOTE — SUBJECTIVE & OBJECTIVE
Interval History:       Review of Systems   Constitutional: Negative for decreased appetite.   HENT:  Negative for ear discharge.    Eyes:  Negative for blurred vision.   Respiratory:  Negative for hemoptysis.    Endocrine: Negative for polyphagia.   Hematologic/Lymphatic: Negative for adenopathy.   Skin:  Negative for color change.   Musculoskeletal:  Negative for joint swelling.   Genitourinary:  Negative for bladder incontinence.   Neurological:  Negative for brief paralysis.   Psychiatric/Behavioral:  Negative for hallucinations.    Allergic/Immunologic: Negative for hives.     Objective:     Vital Signs (Most Recent):  Temp: 97.8 °F (36.6 °C) (02/20/25 0803)  Pulse: 89 (02/20/25 0804)  Resp: 17 (02/20/25 0803)  BP: 121/62 (02/20/25 0803)  SpO2: 96 % (02/20/25 0803) Vital Signs (24h Range):  Temp:  [97.4 °F (36.3 °C)-98.9 °F (37.2 °C)] 97.8 °F (36.6 °C)  Pulse:  [] 89  Resp:  [17-28] 17  SpO2:  [92 %-97 %] 96 %  BP: ()/(50-67) 121/62     Weight: 85.9 kg (189 lb 6 oz)  Body mass index is 27.97 kg/m².     SpO2: 96 %         Intake/Output Summary (Last 24 hours) at 2/20/2025 1003  Last data filed at 2/20/2025 0825  Gross per 24 hour   Intake 120 ml   Output 0 ml   Net 120 ml       Lines/Drains/Airways       Drain  Duration             Female External Urinary Catheter w/ Suction 02/15/25 0011 5 days              Peripheral Intravenous Line  Duration                  Peripheral IV - Single Lumen 02/15/25 0610 20 G Anterior;Right Forearm 5 days         Peripheral IV - Single Lumen 02/18/25 2029 22 G Anterior;Left Forearm 1 day                       Physical Exam  Constitutional:       Appearance: She is well-developed.   HENT:      Head: Normocephalic and atraumatic.   Eyes:      Conjunctiva/sclera: Conjunctivae normal.      Pupils: Pupils are equal, round, and reactive to light.   Cardiovascular:      Rate and Rhythm: Normal rate.      Pulses: Intact distal pulses.      Heart sounds: Normal heart sounds.  Specialty Pharmacy - Refill Coordination    Specialty Medication Orders Linked to Encounter      Flowsheet Row Most Recent Value   Medication #1 evolocumab (REPATHA SURECLICK) 140 mg/mL PnIj (Order#[id number], Rx#[id number])            Refill Questions - Documented Responses      Flowsheet Row Most Recent Value   Refill Screening Questions    Would patient like to speak to a pharmacist? No   When does the patient need to receive the medication? 06/23/23   Refill Delivery Questions    How will the patient receive the medication? MED[facility]   When does the patient need to receive the medication? 06/23/23   Shipping Address Home   Address in [facility] confirmed and updated if neccessary? Yes   Expected Copay ($) 0   Is the patient able to afford the medication copay? Yes   Payment Method zero copay   Days supply of Refill 28   Supplies needed? No supplies needed   Refill activity completed? Yes   Refill activity plan Refill scheduled   Shipment/Pickup Date: 06/20/23            Current Outpatient Medications   Medication Sig    aluminum & magnesium hydroxide-simethicone (MYLANTA MAXIMUM STRENGTH) 400-400-40 mg/5 mL suspension Take 10 mLs by mouth every 6 (six) hours as needed for Indigestion.    aspirin (ECOTRIN) 81 MG EC tablet Take 81 mg by mouth once daily.    carvediloL (COREG) 3.125 MG tablet Take 1 tablet (3.125 mg total) by mouth 2 (two) times daily with meals.    clopidogreL (PLAVIX) 75 mg tablet Take 1 tablet (75 mg total) by mouth once daily.    co-enzyme Q-10 30 mg capsule Take 30 mg by mouth once daily.     dapagliflozin (FARXIGA) 10 mg tablet Take 10 mg by mouth once daily. PT STATES SHE HARDLY TAKES IT AND WAS UNSURE OF MG    diclofenac sodium (VOLTAREN) 1 % Gel Apply 2 g topically 4 (four) times daily.    evolocumab (REPATHA SURECLICK) 140 mg/mL PnIj Inject 1 mL (140 mg total) into the skin every 14 (fourteen) days.    famotidine (PEPCID) 20 MG tablet TAKE 1 TABLET TWICE DAILY    fish oil-omega-3 fatty    Pulmonary:      Effort: Pulmonary effort is normal.      Breath sounds: Normal breath sounds.   Abdominal:      General: Bowel sounds are normal.      Palpations: Abdomen is soft.   Musculoskeletal:         General: Normal range of motion.      Cervical back: Normal range of motion and neck supple.   Skin:     General: Skin is warm and dry.   Neurological:      Mental Status: She is alert and oriented to person, place, and time.     PPM site C/D/I       Significant Labs: All pertinent lab results from the last 24 hours have been reviewed.    Significant Imaging: Echocardiogram: 2D echo with color flow doppler: No results found. However, due to the size of the patient record, not all encounters were searched. Please check Results Review for a complete set of results.   acids 300-1,000 mg capsule Take 2 g by mouth once daily.    FOLIC ACID/MULTIVIT-MIN/LUTEIN (CENTRUM SILVER ORAL) Take 1 tablet by mouth once daily. ONE DAILY    furosemide (LASIX) 40 MG tablet Take 0.5 tablets (20 mg total) by mouth once daily.    HYDROcodone-acetaminophen (NORCO)  mg per tablet Take 1 tablet by mouth 2 (two) times daily as needed for Pain.    rosuvastatin (CRESTOR) 20 MG tablet Take 1 tablet (20 mg total) by mouth every evening.    spironolactone (ALDACTONE) 25 MG tablet TAKE 1 TABLET(25 MG) BY MOUTH EVERY DAY   Last reviewed on 6/13/2023  2:08 PM by Myrna Villarreal NP    Review of patient's allergies indicates:   Allergen Reactions    Pcn [penicillins] Other (See Comments)     SOB    Clindamycin Diarrhea    Darvocet a500 [propoxyphene n-acetaminophen]     Erythromycin Other (See Comments)    Mycinette [cetylpyridinium-benzocaine]     Last reviewed on  6/15/2023 3:11 PM by Myrna Villarreal      Tasks added this encounter   No tasks added.   Tasks due within next 3 months   No tasks due.     Lorna Esparza, PharmD  [facility]  [address] [facility] [address]  Phone: [phone]  Fax: [phone]

## 2025-02-20 NOTE — ASSESSMENT & PLAN NOTE
Patient's FSGs are controlled on current medication regimen.  Last A1c reviewed-   Lab Results   Component Value Date    HGBA1C 5.9 (H) 01/14/2025     Most recent fingerstick glucose reviewed-   Recent Labs   Lab 02/19/25  1620 02/19/25  1955 02/20/25  0805 02/20/25  1120   POCTGLUCOSE 188* 224* 205* 204*       Current correctional scale  Medium  Maintain anti-hyperglycemic dose as follows-   Antihyperglycemics (From admission, onward)    Start     Stop Route Frequency Ordered    02/19/25 2100  insulin glargine U-100 (Lantus) pen 10 Units         -- SubQ Nightly 02/19/25 0737    02/18/25 1645  insulin aspart U-100 pen 8 Units         -- SubQ 3 times daily with meals 02/18/25 1428    02/15/25 1358  insulin aspart U-100 pen 0-10 Units         -- SubQ Before meals & nightly PRN 02/15/25 1258        Hold Oral hypoglycemics while patient is in the hospital.; cardiac consistent carbohydrate diet; monitor blood glucose log and titrate to effect  Prandial aspart added

## 2025-02-20 NOTE — PLAN OF CARE
Problem: Physical Therapy  Goal: Physical Therapy Goal  Description: Goals to be met by: 25     Patient will increase functional independence with mobility by performin. Supine to sit with Stand-by Assistance  2. Sit to stand transfer with Stand-by Assistance  3. Gait  x 25-50 feet with Stand-by Assistance using Rolling Walker.   4. Ascend/descend 4 stair with right Handrails and Minimal  Assistance .   5. Lower extremity exercise program x10 reps per handout, with supervision    Outcome: Progressing       Pt with burning sensation near pacemaker site, nursing made aware. Pt ambulated ~10ft using RW and CGA

## 2025-02-20 NOTE — ASSESSMENT & PLAN NOTE
Patient with known CAD s/p stent placement and CABG, which is controlled Will continue Statin; hold aspirin and Plavix setting of impending pacing and monitor for S/Sx of angina/ACS. Continue to monitor on telemetry.     -repeat ischemic evaluation of after PPM; defer timing to cardiology  -cardiology follow-up outpatient  -patient with chest pain and elevated troponin of 3 on 02/19   -S/p heart catheterization on 02/19, [facility] with patent stents and moderate LAD/RCA disease - no culprit identified

## 2025-02-20 NOTE — ASSESSMENT & PLAN NOTE
Dressing changed - site C/D/I. A-sensed V-paced. Would hydrate today with IVF given N/V and rsising Cr. Ok for d/c in AM if stable. OV with me 2 weeks for staple removal. Resume ASA/brilinta. Rx for pain medication and Abx sent to pharmacy    2/19/25 has remained tachycardic 100-110. Magnet placed over device due to concern for PMT. Device interrogated - normal function. No PMT. Tracking atria appropriately at 100-110. Check stat echo to look for pericardial effusion and EF. Add BB to slow atrial rate as tolerated    2/20/25 HR improved 70-80s. A-sensed V-paced. PPM check yesterday with normal device function. OV with me 2/28/25 for staple removal

## 2025-02-20 NOTE — PT/OT/SLP PROGRESS
Occupational Therapy   Treatment    Name: Lorena Contreras  MRN: [id number]  Admitting Diagnosis:  Complete heart block  1 Day Post-Op    Recommendations:     Discharge Recommendations: Low Intensity Therapy  Discharge Equipment Recommendations:  none  Barriers to discharge:  None    Assessment:     Lorena Contreras is a 74 y.o. female with a medical diagnosis of Complete heart block.  She presents with decondtioning, dizziness in stance and nausea/heaving after ambulation but reporting that she is feeling stronger. Performance deficits affecting function are weakness, impaired endurance, impaired self care skills, impaired functional mobility, decreased lower extremity function, decreased upper extremity function, impaired cardiopulmonary response to activity, impaired skin, gait instability.     Rehab Prognosis:  Good; patient would benefit from acute skilled OT services to address these deficits and reach maximum level of function.       Plan:     Patient to be seen  (3-5x/week) to address the above listed problems via self-care/home management, therapeutic activities, therapeutic exercises  Plan of Care Expires: 03/18/25  Plan of Care Reviewed with: patient    Subjective     Chief Complaint: Dizziness/nausea, headache at baseline  Patient/Family Comments/goals: To return to PLOF  Pain/Comfort:  Pain Rating 1:  (burning/stinging pain near pacemaker site)    Objective:     Communicated with: nsg prior to session.  Patient found HOB elevated with bed alarm, telemetry, PureWick, peripheral IV upon OT entry to room.    General Precautions: Standard, pacemaker, fall    Orthopedic Precautions:N/A  Braces: N/A  Respiratory Status: Room air     Occupational Performance:     Bed Mobility:    Patient completed Rolling/Turning to Left with  stand by assistance  Patient completed Scooting/Bridging with stand by assistance  Patient completed Supine to Sit with minimum assistance  Patient completed Sit to Supine with  minimum assistance     Functional Mobility/Transfers:  Patient completed Sit <> Stand Transfer with moderate assistance  with  rolling walker   Patient completed Bed <> Chair Transfer using Step Transfer technique with moderate assistance with rolling walker  Functional Mobility: Pt with fair dynamic seated and standing balance. Moderate assistance to stand from bedside chair but min A/CGA for ambulation ~8 ft in room before pt reporting she was feeling nauseous/increased dizziness and needed to sit; chair follow provided and pt able to sit when needed    Activities of Daily Living:  Upper Body Dressing: moderate assistance to don gown as robe seated EOB 2/2 pain L pacemaker site and active PIV lines  Lower Body Dressing: maximal assistance to adjust B socks supine in bed      [facility] 6 Click ADL: 17    Treatment & Education:  Pt educated on role of OT and POC.   Pt performing skills as listed above.     Patient left HOB elevated with all lines intact, call button in reach, bed alarm on, and nwsg notified    GOALS:   Multidisciplinary Problems       Occupational Therapy Goals          Problem: Occupational Therapy    Goal Priority Disciplines Outcome Interventions   Occupational Therapy Goal     OT, PT/OT Progressing    Description: Goals to be met by: 03/18/2025      Patient will increase functional independence with ADLs by performing:    UE Dressing with Modified [place].  LE Dressing with Modified [place].  Grooming while seated or standing with Modified [place].  Toileting from toilet or BSC with Modified [place] for hygiene and clothing management.   Toilet transfer to toilet or BSC with Modified [place].  Increased functional strength to WFL for self care.  Upper extremity exercise program x10 reps per handout, with assistance as needed.                            Time Tracking:     OT Date of Treatment: 02/20/25  OT Start Time: 1317  OT Stop Time: 1340  OT Total Time (min): 23  min    Billable Minutes:Self Care/Home Management 11  Therapeutic Activity 12    OT/ERIKA: OT     Number of ERIKA visits since last OT visit: 0    2/20/2025

## 2025-02-20 NOTE — ASSESSMENT & PLAN NOTE
Baseline creatinine 2.0  On presentation creatinine 3.5    KYA due to cardiogenic shock secondary to complete heart block  2/17 pacemaker placed  2/19 KYA improving Cr 3.8,   2/20 Cr 4.1, worsened likely due to IVC and hypotension    Plan/Recommendation  - LR 75ml/hr  -No acute indication for dialysis or diuretics  Continue to monitor kidney function and UOP     -Keep MAP > 65  -Keep hemoglobin > 7  -Strict ins and outs  -Avoid nephrotoxic agents if possible and renally dose medications  -Avoid drastic hemodynamic changes if possible    Hyponatremia   - likely due to KYA,    - Na 137 --> 132  worsened,   - IVF above  - continue to monitor    Metabolic acidosis   - due to KYA,   - CO2 15--> 19 --> 16 , worsened  - continue to monitor

## 2025-02-20 NOTE — ASSESSMENT & PLAN NOTE
-Hx of chest pain on presentation; different from previous anginal pains  -EKG showed complete heart block  -Discontinue all AV frances blocking agents  -Monitor patient on telemetry  -S/p Medtronic PPM on 02/17  -Continue Keflex; arm sling per Cardiology  -Restart aspirin and Brilinta 02/18/25  -Patient with chest pain and tachycardia on 02/19. Troponin peaked at 3  -Discussed with cardiology, S/p heart catheterization on 02/19, [facility] with patent stents and moderate LAD/RCA disease - no culprit identified   -Change antibiotics to ceftriaxone to cover UTI, urine culture with Klebsiella oxytoca

## 2025-02-20 NOTE — PROGRESS NOTES
[facility] Med Surg  Cardiology  Progress Note    Patient Name: Lorena Contreras  MRN: [id number]  Admission Date: 2/15/2025  Hospital Length of Stay: 5 days  Code Status: Full Code   Attending Physician: Singh Barbour DO   Primary Care Physician: Jorge Paris MD  Expected Discharge Date: 2/21/2025  Principal Problem:Complete heart block    Subjective:     Hospital Course:   2/16/25 C/O neck pain and poor appetite. HR 40 CHB. Agreeable to PPM tomorrow  2/18/25 N/V overnight. Feels a little better this AM. A-sensed V-paced 90s. Cr 4.1. PPM dressing changed - site C/D/I    2/19/25 Remains tachycardic 100-110. Magnet placed over device due to concern that she was in PMT. Medtronic interrogated device with good function and no PMT detected. Device is tracking atrial rate at 100-110. Patient feels fatigued. Had CP overnight. Troponin 1.9 to 2.3 to 3.3. Repeat echo ordered    2/20/25 Feels better today - hungry and thirsty. A-sensed V-paced HR improved 70-80s. Cr 4.1      2/19/25 LHC - EDP 25, Peak to peak LV to Aortic gradient 20 mmHg, LAD long 50% mid, D1 long 70% proximal, RI luminal irregularities - stent patent, Cx luminal irregularities - stent patent, RCA patent mid stent with 50% ISR     No culprit lesion identified - reviewed with Dr Saldaña - medical Rx  25 cc contrast used - hydrate post procedure  Continue ASA/brilinta    2/17/25 Medtronic dual pacemaker placed left SC vein     Echo 2/15/25    Left Ventricle: The left ventricle is normal in size. There is moderate concentric hypertrophy. There is hyperdynamic systolic function with a visually estimated ejection fraction of 70 - 75%.    Right Ventricle: Normal right ventricular cavity size. Systolic function is normal.    Left Atrium: Left atrium is moderately dilated.    Right Atrium: Right atrium is mildly dilated.    Aortic Valve: There is moderate stenosis. Aortic valve area by VTI is 0.8 cm². Aortic valve peak velocity is 3.4 m/s. Mean gradient  is 27 mmHg. The dimensionless index is 0.27.    Mitral Valve: There is mild stenosis. The mean pressure gradient across the mitral valve is 5 mmHg at a heart rate of 42  bpm. There is mild regurgitation.    Tricuspid Valve: There is moderate regurgitation.    Pulmonary Artery: The estimated pulmonary artery systolic pressure is 49 mmHg.    IVC/SVC: Intermediate venous pressure at 8 mmHg.    Interval History:       Review of Systems   Constitutional: Negative for decreased appetite.   HENT:  Negative for ear discharge.    Eyes:  Negative for blurred vision.   Respiratory:  Negative for hemoptysis.    Endocrine: Negative for polyphagia.   Hematologic/Lymphatic: Negative for adenopathy.   Skin:  Negative for color change.   Musculoskeletal:  Negative for joint swelling.   Genitourinary:  Negative for bladder incontinence.   Neurological:  Negative for brief paralysis.   Psychiatric/Behavioral:  Negative for hallucinations.    Allergic/Immunologic: Negative for hives.     Objective:     Vital Signs (Most Recent):  Temp: 97.8 °F (36.6 °C) (02/20/25 0803)  Pulse: 89 (02/20/25 0804)  Resp: 17 (02/20/25 0803)  BP: 121/62 (02/20/25 0803)  SpO2: 96 % (02/20/25 0803) Vital Signs (24h Range):  Temp:  [97.4 °F (36.3 °C)-98.9 °F (37.2 °C)] 97.8 °F (36.6 °C)  Pulse:  [] 89  Resp:  [17-28] 17  SpO2:  [92 %-97 %] 96 %  BP: ()/(50-67) 121/62     Weight: 85.9 kg (189 lb 6 oz)  Body mass index is 27.97 kg/m².     SpO2: 96 %         Intake/Output Summary (Last 24 hours) at 2/20/2025 1003  Last data filed at 2/20/2025 0825  Gross per 24 hour   Intake 120 ml   Output 0 ml   Net 120 ml       Lines/Drains/Airways       Drain  Duration             Female External Urinary Catheter w/ Suction 02/15/25 0011 5 days              Peripheral Intravenous Line  Duration                  Peripheral IV - Single Lumen 02/15/25 0610 20 G Anterior;Right Forearm 5 days         Peripheral IV - Single Lumen 02/18/25 2029 22 G Anterior;Left  Forearm 1 day                       Physical Exam  Constitutional:       Appearance: She is well-developed.   HENT:      Head: Normocephalic and atraumatic.   Eyes:      Conjunctiva/sclera: Conjunctivae normal.      Pupils: Pupils are equal, round, and reactive to light.   Cardiovascular:      Rate and Rhythm: Normal rate.      Pulses: Intact distal pulses.      Heart sounds: Normal heart sounds.   Pulmonary:      Effort: Pulmonary effort is normal.      Breath sounds: Normal breath sounds.   Abdominal:      General: Bowel sounds are normal.      Palpations: Abdomen is soft.   Musculoskeletal:         General: Normal range of motion.      Cervical back: Normal range of motion and neck supple.   Skin:     General: Skin is warm and dry.   Neurological:      Mental Status: She is alert and oriented to person, place, and time.     PPM site C/D/I       Significant Labs: All pertinent lab results from the last 24 hours have been reviewed.    Significant Imaging: Echocardiogram: 2D echo with color flow doppler: No results found. However, due to the size of the patient record, not all encounters were searched. Please check Results Review for a complete set of results.  Assessment and Plan:     Brief HPI:     Cardiac pacemaker in situ  Dressing changed - site C/D/I. A-sensed V-paced. Would hydrate today with IVF given N/V and rsising Cr. Ok for d/c in AM if stable. OV with me 2 weeks for staple removal. Resume ASA/brilinta. Rx for pain medication and Abx sent to pharmacy    2/19/25 has remained tachycardic 100-110. Magnet placed over device due to concern for PMT. Device interrogated - normal function. No PMT. Tracking atria appropriately at 100-110. Check stat echo to look for pericardial effusion and EF. Add BB to slow atrial rate as tolerated    2/20/25 HR improved 70-80s. A-sensed V-paced. PPM check yesterday with normal device function. OV with me 2/28/25 for staple removal    CHB (complete heart block)  New Dx. Hold  rate lowering medications. Hold ASA/brilinta. BP adequate no need for TVP at  this point. Will plan PPM Monday if CHB persists. EF normal on echo    2/16/25 Still with CHB. Dual PPM tomorrow - risks/benefits explained and she agrees to proceed    Aortic stenosis  AS moderate    Echo  Result Date: 2/15/2025    Left Ventricle: The left ventricle is normal in size. There is moderate   concentric hypertrophy. There is hyperdynamic systolic function with a   visually estimated ejection fraction of 70 - 75%.    Right Ventricle: Normal right ventricular cavity size. Systolic   function is normal.    Left Atrium: Left atrium is moderately dilated.    Right Atrium: Right atrium is mildly dilated.    Aortic Valve: There is moderate stenosis. Aortic valve area by VTI is   0.8 cm². Aortic valve peak velocity is 3.4 m/s. Mean gradient is 27 mmHg.   The dimensionless index is 0.27.    Mitral Valve: There is mild stenosis. The mean pressure gradient across   the mitral valve is 5 mmHg at a heart rate of 42  bpm. There is mild   regurgitation.    Tricuspid Valve: There is moderate regurgitation.    Pulmonary Artery: The estimated pulmonary artery systolic pressure is   49 mmHg.    IVC/SVC: Intermediate venous pressure at 8 mmHg.        Echo  Result Date: 10/31/2024    Left Ventricle: The left ventricle is normal in size. Normal wall   thickness. Normal wall motion. There is normal systolic function with a   visually estimated ejection fraction of 60 - 65%. Grade II diastolic   dysfunction. Elevated left ventricular filling pressure.    Right Ventricle: Normal right ventricular cavity size. Wall thickness   is normal. Systolic function is normal.    Left Atrium: Left atrium is severely dilated.    Aortic Valve: There is moderate aortic valve sclerosis. Moderately   restricted motion. There is moderate stenosis. Aortic valve area by VTI is   1.1 cm2. Aortic valve peak velocity is 3.1 m/s. Mean gradient is 20.1   mmHg. The  dimensionless index is 0.37.    Mitral Valve: There is mild regurgitation.    Tricuspid Valve: There is mild regurgitation.    Pulmonary Artery: There is pulmonary hypertension. The estimated   pulmonary artery systolic pressure is 46 mmHg.    IVC/SVC: Normal venous pressure at 3 mmHg.           Coronary artery disease of native artery of native heart with stable angina pectoris  Hx multivessel CAD. Suspect CP is demand ischemia from CHB over ACS.High risk for LHC with Cr 3.5. Will plan repeat ischemic evlaution after PPM if still symptomatic. EF normal on echo    2/19/25 CP overnight. Troponin increased to 3. Repeat echo stat. High risk for BHUPENDRA with ARF but will discuss repeat cath based on echo and clinical course. Back on ASA/brilinta    2/20/25 LHC with patent stents and moderate LAD/RCA disease - no culprit identified - medical Rx    Mixed hyperlipidemia  On statin          VTE Risk Mitigation (From admission, onward)           Ordered     IP VTE HIGH RISK PATIENT  Once         02/15/25 1252     Place sequential compression device  Until discontinued         02/15/25 1252     Place sequential compression device  Until discontinued         02/15/25 0802                    Karl Rico MD  Cardiology  [facility] [place] - Med Surg

## 2025-02-21 ENCOUNTER — TELEPHONE (OUTPATIENT)
Dept: CARDIOLOGY | Facility: CLINIC | Age: 75
End: 2025-02-21
Payer: MEDICARE

## 2025-02-21 LAB
ANION GAP SERPL CALC-SCNC: 12 MMOL/L (ref 8–16)
BUN SERPL-MCNC: 107 MG/DL (ref 8–23)
CALCIUM SERPL-MCNC: 8.4 MG/DL (ref 8.7–10.5)
CHLORIDE SERPL-SCNC: 103 MMOL/L (ref 95–110)
CO2 SERPL-SCNC: 17 MMOL/L (ref 23–29)
CREAT SERPL-MCNC: 4.2 MG/DL (ref 0.5–1.4)
EST. GFR  (NO RACE VARIABLE): 11 ML/MIN/1.73 M^2
GLUCOSE SERPL-MCNC: 74 MG/DL (ref 70–110)
POCT GLUCOSE: 107 MG/DL (ref 70–110)
POCT GLUCOSE: 140 MG/DL (ref 70–110)
POCT GLUCOSE: 175 MG/DL (ref 70–110)
POCT GLUCOSE: 203 MG/DL (ref 70–110)
POTASSIUM SERPL-SCNC: 4.2 MMOL/L (ref 3.5–5.1)
SODIUM SERPL-SCNC: 132 MMOL/L (ref 136–145)

## 2025-02-21 PROCEDURE — 25000003 PHARM REV CODE 250: Mod: HCNC | Performed by: EMERGENCY MEDICINE

## 2025-02-21 PROCEDURE — 97530 THERAPEUTIC ACTIVITIES: CPT | Mod: HCNC,CQ

## 2025-02-21 PROCEDURE — C1752 CATH,HEMODIALYSIS,SHORT-TERM: HCPCS | Mod: HCNC

## 2025-02-21 PROCEDURE — 25000003 PHARM REV CODE 250: Mod: HCNC | Performed by: STUDENT IN AN ORGANIZED HEALTH CARE EDUCATION/TRAINING PROGRAM

## 2025-02-21 PROCEDURE — 36415 COLL VENOUS BLD VENIPUNCTURE: CPT | Mod: HCNC | Performed by: STUDENT IN AN ORGANIZED HEALTH CARE EDUCATION/TRAINING PROGRAM

## 2025-02-21 PROCEDURE — 5A1D70Z PERFORMANCE OF URINARY FILTRATION, INTERMITTENT, LESS THAN 6 HOURS PER DAY: ICD-10-PCS | Performed by: STUDENT IN AN ORGANIZED HEALTH CARE EDUCATION/TRAINING PROGRAM

## 2025-02-21 PROCEDURE — 63600175 PHARM REV CODE 636 W HCPCS: Mod: HCNC | Performed by: STUDENT IN AN ORGANIZED HEALTH CARE EDUCATION/TRAINING PROGRAM

## 2025-02-21 PROCEDURE — 21400001 HC TELEMETRY ROOM: Mod: HCNC

## 2025-02-21 PROCEDURE — 90935 HEMODIALYSIS ONE EVALUATION: CPT | Mod: HCNC

## 2025-02-21 PROCEDURE — 02H633Z INSERTION OF INFUSION DEVICE INTO RIGHT ATRIUM, PERCUTANEOUS APPROACH: ICD-10-PCS | Performed by: STUDENT IN AN ORGANIZED HEALTH CARE EDUCATION/TRAINING PROGRAM

## 2025-02-21 PROCEDURE — 99232 SBSQ HOSP IP/OBS MODERATE 35: CPT | Mod: HCNC,,, | Performed by: STUDENT IN AN ORGANIZED HEALTH CARE EDUCATION/TRAINING PROGRAM

## 2025-02-21 PROCEDURE — 25000242 PHARM REV CODE 250 ALT 637 W/ HCPCS: Mod: HCNC | Performed by: INTERNAL MEDICINE

## 2025-02-21 PROCEDURE — 0JH63XZ INSERTION OF TUNNELED VASCULAR ACCESS DEVICE INTO CHEST SUBCUTANEOUS TISSUE AND FASCIA, PERCUTANEOUS APPROACH: ICD-10-PCS | Performed by: STUDENT IN AN ORGANIZED HEALTH CARE EDUCATION/TRAINING PROGRAM

## 2025-02-21 PROCEDURE — 25000003 PHARM REV CODE 250: Mod: HCNC | Performed by: INTERNAL MEDICINE

## 2025-02-21 PROCEDURE — 80048 BASIC METABOLIC PNL TOTAL CA: CPT | Mod: HCNC | Performed by: STUDENT IN AN ORGANIZED HEALTH CARE EDUCATION/TRAINING PROGRAM

## 2025-02-21 PROCEDURE — 25000003 PHARM REV CODE 250: Mod: HCNC | Performed by: HOSPITALIST

## 2025-02-21 PROCEDURE — 99223 1ST HOSP IP/OBS HIGH 75: CPT | Mod: HCNC,,, | Performed by: STUDENT IN AN ORGANIZED HEALTH CARE EDUCATION/TRAINING PROGRAM

## 2025-02-21 PROCEDURE — 25000003 PHARM REV CODE 250: Mod: HCNC

## 2025-02-21 RX ORDER — INSULIN ASPART 100 [IU]/ML
5 INJECTION, SOLUTION INTRAVENOUS; SUBCUTANEOUS
Status: DISCONTINUED | OUTPATIENT
Start: 2025-02-21 | End: 2025-02-23

## 2025-02-21 RX ORDER — ONDANSETRON HYDROCHLORIDE 2 MG/ML
4 INJECTION, SOLUTION INTRAVENOUS EVERY 4 HOURS PRN
Status: DISCONTINUED | OUTPATIENT
Start: 2025-02-21 | End: 2025-02-25 | Stop reason: HOSPADM

## 2025-02-21 RX ORDER — SODIUM CHLORIDE 9 MG/ML
INJECTION, SOLUTION INTRAVENOUS ONCE
Status: CANCELLED | OUTPATIENT
Start: 2025-02-21 | End: 2025-02-21

## 2025-02-21 RX ORDER — FUROSEMIDE 40 MG/1
40 TABLET ORAL DAILY
Status: DISCONTINUED | OUTPATIENT
Start: 2025-02-21 | End: 2025-02-21

## 2025-02-21 RX ORDER — FAMOTIDINE 10 MG/ML
INJECTION INTRAVENOUS
Status: COMPLETED | OUTPATIENT
Start: 2025-02-21 | End: 2025-02-21

## 2025-02-21 RX ORDER — LIDOCAINE HYDROCHLORIDE 10 MG/ML
INJECTION, SOLUTION INFILTRATION; PERINEURAL
Status: COMPLETED | OUTPATIENT
Start: 2025-02-21 | End: 2025-02-21

## 2025-02-21 RX ORDER — INSULIN GLARGINE 100 [IU]/ML
5 INJECTION, SOLUTION SUBCUTANEOUS NIGHTLY
Status: DISCONTINUED | OUTPATIENT
Start: 2025-02-21 | End: 2025-02-23

## 2025-02-21 RX ORDER — FUROSEMIDE 40 MG/1
40 TABLET ORAL 2 TIMES DAILY
Status: DISCONTINUED | OUTPATIENT
Start: 2025-02-22 | End: 2025-02-22

## 2025-02-21 RX ORDER — FENTANYL CITRATE 50 UG/ML
INJECTION, SOLUTION INTRAMUSCULAR; INTRAVENOUS
Status: COMPLETED | OUTPATIENT
Start: 2025-02-21 | End: 2025-02-21

## 2025-02-21 RX ORDER — HEPARIN SODIUM 1000 [USP'U]/ML
INJECTION, SOLUTION INTRAVENOUS; SUBCUTANEOUS
Status: COMPLETED | OUTPATIENT
Start: 2025-02-21 | End: 2025-02-21

## 2025-02-21 RX ORDER — SODIUM CHLORIDE 9 MG/ML
INJECTION, SOLUTION INTRAVENOUS
Status: CANCELLED | OUTPATIENT
Start: 2025-02-21

## 2025-02-21 RX ORDER — CEFAZOLIN SODIUM 1 G/3ML
INJECTION, POWDER, FOR SOLUTION INTRAMUSCULAR; INTRAVENOUS
Status: COMPLETED | OUTPATIENT
Start: 2025-02-21 | End: 2025-02-21

## 2025-02-21 RX ORDER — ONDANSETRON HYDROCHLORIDE 2 MG/ML
INJECTION, SOLUTION INTRAVENOUS
Status: COMPLETED | OUTPATIENT
Start: 2025-02-21 | End: 2025-02-21

## 2025-02-21 RX ADMIN — HYDRALAZINE HYDROCHLORIDE 100 MG: 25 TABLET ORAL at 10:02

## 2025-02-21 RX ADMIN — ASPIRIN 81 MG: 81 TABLET, COATED ORAL at 09:02

## 2025-02-21 RX ADMIN — ATORVASTATIN CALCIUM 80 MG: 40 TABLET, FILM COATED ORAL at 09:02

## 2025-02-21 RX ADMIN — METOPROLOL TARTRATE 25 MG: 25 TABLET, FILM COATED ORAL at 09:02

## 2025-02-21 RX ADMIN — SIMETHICONE 80 MG: 80 TABLET, CHEWABLE ORAL at 11:02

## 2025-02-21 RX ADMIN — HYDRALAZINE HYDROCHLORIDE 100 MG: 25 TABLET ORAL at 05:02

## 2025-02-21 RX ADMIN — HYDROCODONE BITARTRATE AND ACETAMINOPHEN 1 TABLET: 5; 325 TABLET ORAL at 05:02

## 2025-02-21 RX ADMIN — TICAGRELOR 60 MG: 60 TABLET ORAL at 09:02

## 2025-02-21 RX ADMIN — CEFAZOLIN SODIUM 1 G: 1 POWDER, FOR SOLUTION INTRAMUSCULAR; INTRAVENOUS at 02:02

## 2025-02-21 RX ADMIN — HEPARIN SODIUM 3200 UNITS: 1000 INJECTION, SOLUTION INTRAVENOUS; SUBCUTANEOUS at 02:02

## 2025-02-21 RX ADMIN — ONDANSETRON 4 MG: 2 INJECTION INTRAMUSCULAR; INTRAVENOUS at 10:02

## 2025-02-21 RX ADMIN — ONDANSETRON 4 MG: 2 INJECTION, SOLUTION INTRAMUSCULAR; INTRAVENOUS at 01:02

## 2025-02-21 RX ADMIN — PROMETHAZINE HYDROCHLORIDE 6.25 MG: 25 INJECTION INTRAMUSCULAR; INTRAVENOUS at 03:02

## 2025-02-21 RX ADMIN — FLUOXETINE HYDROCHLORIDE 40 MG: 20 CAPSULE ORAL at 09:02

## 2025-02-21 RX ADMIN — PANTOPRAZOLE SODIUM 40 MG: 40 TABLET, DELAYED RELEASE ORAL at 09:02

## 2025-02-21 RX ADMIN — OXYCODONE 10 MG: 5 TABLET ORAL at 10:02

## 2025-02-21 RX ADMIN — INSULIN GLARGINE 5 UNITS: 100 INJECTION, SOLUTION SUBCUTANEOUS at 09:02

## 2025-02-21 RX ADMIN — CEFTRIAXONE 2 G: 2 INJECTION, POWDER, FOR SOLUTION INTRAMUSCULAR; INTRAVENOUS at 12:02

## 2025-02-21 RX ADMIN — LIDOCAINE HYDROCHLORIDE 5 ML: 10 INJECTION, SOLUTION INFILTRATION; PERINEURAL at 02:02

## 2025-02-21 RX ADMIN — ISOSORBIDE MONONITRATE 60 MG: 30 TABLET, EXTENDED RELEASE ORAL at 09:02

## 2025-02-21 RX ADMIN — FENTANYL CITRATE 25 MCG: 50 INJECTION INTRAMUSCULAR; INTRAVENOUS at 02:02

## 2025-02-21 RX ADMIN — HYDROXYZINE PAMOATE 50 MG: 25 CAPSULE ORAL at 10:02

## 2025-02-21 RX ADMIN — FAMOTIDINE 10 MG: 10 INJECTION, SOLUTION INTRAVENOUS at 01:02

## 2025-02-21 NOTE — PROGRESS NOTES
[facility] Medicine  Progress Note    Patient Name: Lorena Contreras  MRN: [id number]  Patient Class: IP- Inpatient   Admission Date: 2/15/2025  Length of Stay: 6 days  Attending Physician: Singh Barbour DO  Primary Care Provider: Jorge Paris MD        Subjective     Principal Problem:Complete heart block        HPI:  A pleasant  72 yo F with PMHx of  DM2, Fibromyalgia, lymphoma s/p chemo, HTN, HLD, CVA, MI, CAD s/p PCIs  CKD3b, HFpEF, and anemia who presented to the ED with a chief complaint of chest pain radiating to the back with onset a day before presentation.    Pain was said to be sudden in onset; no associated nausea or diaphoresis.  No Preceding PND orthopnea or leg swelling; pain was described as different from her previous coronary events.  Patient however endorsed dizziness but this has been going on for some time.  She denied any syncopal event  Symptoms progress and this prompted patient to come to the ED    Retrospective chart review indicates multiple ED visits for observation for atypical chest pain.  Cardiac history is significant for CAD with JESIKA x 2 to RCA 3/29/19 - JESIKA to RI and Cx 1/8/20      On presentation to the ED; patient was bradycardic to 32 BPM which subsequently improved and mildly hypertensive.  EKG showed complete heart block.  Patient was admitted to the ICU for further management.    Overview/Hospital Course:  73-year-old female with history of diabetes, anxiety,HTN, HLD, CVA, MI, CAD s/p PCIs  CKD3b, HFpEF, and anemia admitted to ICU on 02/15/2025 for complete heart block and KYA.  Cardiology consulted- patient s/p ppm on 02/17.  Patient to start aspirin and Brilinta on 02/18.  Nephrology consulted for KYA on CKD.Patient with chest pain and tachycardia overnight on 02/18. Fount to have elevated troponin 1.9 and peaked at 3.3.  EKG noted and reviewed with NO STEMI and unchanged from previous on 2/17. Device interrogated - normal function. No PMT  Discussed with cardiology. S/p heart catheterization on 02/19, [facility] [place] with patent stents and moderate LAD/RCA disease - no culprit identified . Continue medical Rx per cardiology.     Interval History:  No acute overnight events.  Patient remained afebrile.  Much calmer this morning.  Still with some anxiety, but significantly calmer compared to yesterday.  Currently denies any pain.  Wants to try regular diet.    Review of Systems   Constitutional:  Negative for fatigue and fever.   Eyes:  Negative for visual disturbance.   Respiratory:  Negative for cough, chest tightness and shortness of breath.    Cardiovascular:  Negative for chest pain and palpitations.   Gastrointestinal:  Negative for abdominal pain, nausea and vomiting.   Genitourinary:  Negative for difficulty urinating, dysuria, frequency and urgency.   Musculoskeletal:  Negative for neck pain.   Neurological:  Negative for dizziness and headaches.   Psychiatric/Behavioral:  Negative for confusion. The patient is nervous/anxious.      Objective:     Vital Signs (Most Recent):  Temp: 97.3 °F (36.3 °C) (02/21/25 0827)  Pulse: 87 (02/21/25 0827)  Resp: 18 (02/21/25 0827)  BP: (!) 147/63 (02/21/25 0827)  SpO2: 98 % (02/21/25 0827) Vital Signs (24h Range):  Temp:  [97.3 °F (36.3 °C)-97.7 °F (36.5 °C)] 97.3 °F (36.3 °C)  Pulse:  [64-88] 87  Resp:  [15-18] 18  SpO2:  [98 %-100 %] 98 %  BP: ()/(51-70) 147/63     Weight: 85.9 kg (189 lb 6 oz)  Body mass index is 27.97 kg/m².    Intake/Output Summary (Last 24 hours) at 2/21/2025 0985  Last data filed at 2/21/2025 0615  Gross per 24 hour   Intake 240 ml   Output 550 ml   Net -310 ml         Physical Exam  Vitals and nursing note reviewed.   Constitutional:       General: She is not in acute distress.     Appearance: She is ill-appearing.   HENT:      Mouth/Throat:      Mouth: Mucous membranes are moist.   Cardiovascular:      Rate and Rhythm: Normal rate and regular rhythm.      Pulses: Normal pulses.    Pulmonary:      Effort: Pulmonary effort is normal. No respiratory distress.      Breath sounds: Normal breath sounds. No wheezing or rales.      Comments: On room air.  Clean surgical dressing on left anterior chest wall  Abdominal:      General: There is no distension.      Palpations: Abdomen is soft.      Tenderness: There is no abdominal tenderness.   Musculoskeletal:      Cervical back: No tenderness.      Right lower leg: No edema.      Left lower leg: No edema.   Neurological:      General: No focal deficit present.      Mental Status: She is alert and oriented to person, place, and time.   Psychiatric:         Mood and Affect: Mood is anxious.      Comments: Patient anxious on exam, but much calmer compared to yesterday             Significant Labs: All pertinent labs within the past 24 hours have been reviewed.    Significant Imaging: I have reviewed all pertinent imaging results/findings within the past 24 hours.    Assessment and Plan     * Complete heart block  -Hx of chest pain on presentation; different from previous anginal pains  -EKG showed complete heart block  -Discontinue all AV frances blocking agents  -Monitor patient on telemetry  -S/p [employer] PPM on 02/17  -Continue Keflex; arm sling per Cardiology  -Restart aspirin and Brilinta 02/18/25  -Patient with chest pain and tachycardia on 02/19. Troponin peaked at 3  -Discussed with cardiology, S/p heart catheterization on 02/19, [facility] with patent stents and moderate LAD/RCA disease - no culprit identified   -Change antibiotics to ceftriaxone to cover UTI, urine culture with Klebsiella oxytoca      Cardiac pacemaker in situ  -s/p ppm on 02/17.    Acute kidney injury superimposed on stage 3b chronic kidney disease  KYA is likely due to pre-renal azotemia due to intravascular volume depletion. Baseline creatinine is  1.6 to 2.2 . Most recent creatinine and eGFR are listed below.  Recent Labs     02/19/25  0127 02/20/25  0429 02/21/25  6059    CREATININE 3.8* 4.1* 4.2*   EGFRNORACEVR 12* 11* 11*        Plan  - KYA is stable  - Avoid nephrotoxins and renally dose meds for GFR listed above  - Monitor urine output, serial BMP, and adjust therapy as needed  - nephrology consulted; input appreciated  - monitor closely     Coronary artery disease of native artery of native heart with stable angina pectoris  Patient with known CAD s/p stent placement and CABG, which is controlled Will continue Statin; hold aspirin and Plavix setting of impending pacing and monitor for S/Sx of angina/ACS. Continue to monitor on telemetry.     -repeat ischemic evaluation of after PPM; defer timing to cardiology  -cardiology follow-up outpatient  -patient with chest pain and elevated troponin of 3 on 02/19   -S/p heart catheterization on 02/19, [facility] with patent stents and moderate LAD/RCA disease - no culprit identified     Aortic stenosis  Echocardiogram with evidence of aortic stenosis that is moderate . The patient's most recent echocardiogram result is listed below. We will manage the valvular abnormality by avoiding volume overload; and cardiology follow up as an outpatient    Echo Saline Bubble? No  Result Date: 2/19/2025    Left Ventricle: The left ventricle is normal in size. There is mild   concentric hypertrophy. Septal motion is consistent with pacing. There is   normal systolic function with a visually estimated ejection fraction of 60   - 65%. Grade II diastolic dysfunction.    Right Ventricle: Mild right ventricular enlargement. Systolic function   is normal.    Left Atrium: Left atrium is moderately dilated.    Right Atrium: Right atrium is mildly dilated.    Aortic Valve: There is moderate stenosis. Aortic valve area by VTI is   1.0 cm². Aortic valve peak velocity is 3.2 m/s. Mean gradient is 24 mmHg.   The dimensionless index is 0.33.    Mitral Valve: There is mild stenosis. The mean pressure gradient across   the mitral valve is 12 mmHg at a heart rate of  bpm.  There is mild   regurgitation.    Tricuspid Valve: There is moderate regurgitation.    Pulmonary Artery: The estimated pulmonary artery systolic pressure is   72 mmHg.    IVC/SVC: Intermediate venous pressure at 8 mmHg.    Pericardium: There is a trivial posterior effusion.        Echo  Result Date: 2/15/2025    Left Ventricle: The left ventricle is normal in size. There is moderate   concentric hypertrophy. There is hyperdynamic systolic function with a   visually estimated ejection fraction of 70 - 75%.    Right Ventricle: Normal right ventricular cavity size. Systolic   function is normal.    Left Atrium: Left atrium is moderately dilated.    Right Atrium: Right atrium is mildly dilated.    Aortic Valve: There is moderate stenosis. Aortic valve area by VTI is   0.8 cm². Aortic valve peak velocity is 3.4 m/s. Mean gradient is 27 mmHg.   The dimensionless index is 0.27.    Mitral Valve: There is mild stenosis. The mean pressure gradient across   the mitral valve is 5 mmHg at a heart rate of 42  bpm. There is mild   regurgitation.    Tricuspid Valve: There is moderate regurgitation.    Pulmonary Artery: The estimated pulmonary artery systolic pressure is   49 mmHg.    IVC/SVC: Intermediate venous pressure at 8 mmHg.        Echo  Result Date: 10/31/2024    Left Ventricle: The left ventricle is normal in size. Normal wall   thickness. Normal wall motion. There is normal systolic function with a   visually estimated ejection fraction of 60 - 65%. Grade II diastolic   dysfunction. Elevated left ventricular filling pressure.    Right Ventricle: Normal right ventricular cavity size. Wall thickness   is normal. Systolic function is normal.    Left Atrium: Left atrium is severely dilated.    Aortic Valve: There is moderate aortic valve sclerosis. Moderately   restricted motion. There is moderate stenosis. Aortic valve area by VTI is   1.1 cm2. Aortic valve peak velocity is 3.1 m/s. Mean gradient is 20.1   mmHg. The  dimensionless index is 0.37.    Mitral Valve: There is mild regurgitation.    Tricuspid Valve: There is mild regurgitation.    Pulmonary Artery: There is pulmonary hypertension. The estimated   pulmonary artery systolic pressure is 46 mmHg.    IVC/SVC: Normal venous pressure at 3 mmHg.           Mixed hyperlipidemia  Continue home statin      Diabetes mellitus  Patient's FSGs are controlled on current medication regimen.  Last A1c reviewed-   Lab Results   Component Value Date    HGBA1C 5.9 (H) 01/14/2025     Most recent fingerstick glucose reviewed-   Recent Labs   Lab 02/20/25  1641 02/20/25  1932 02/20/25  2334 02/21/25  0825   POCTGLUCOSE 102 65* 143* 107       Current correctional scale  Medium  Maintain anti-hyperglycemic dose as follows-   Antihyperglycemics (From admission, onward)      Start     Stop Route Frequency Ordered    02/21/25 2100  insulin glargine U-100 (Lantus) pen 5 Units         -- SubQ Nightly 02/21/25 0645    02/21/25 0715  insulin aspart U-100 pen 5 Units         -- SubQ 3 times daily with meals 02/21/25 0645    02/15/25 1358  insulin aspart U-100 pen 0-10 Units         -- SubQ Before meals & nightly PRN 02/15/25 1258          Hold Oral hypoglycemics while patient is in the hospital.; cardiac consistent carbohydrate diet; monitor blood glucose log and titrate to effect  Prandial aspart added    Hyponatremia  Hyponatremia is likely due to renal insufficiency. The patient's most recent sodium results are listed below.  Recent Labs     02/19/25  0127 02/20/25  0429 02/21/25  0526    132* 132*       Plan  - nephrology consulted  - Monitor sodium Daily.   - Patient hyponatremia is stable  - monitor closely    Anxiety  -hydroxyzine p.r.n. every 8 hours for anxiety      Stage 3b chronic kidney disease  Creatine stable for now. BMP reviewed- noted Estimated Creatinine Clearance: 15.2 mL/min (A) (based on SCr of 3.8 mg/dL (H)). according to latest data. Based on current GFR, CKD stage is stage  4 - GFR 15-29.  Monitor UOP and serial BMP and adjust therapy as needed. Renally dose meds. Avoid nephrotoxic medications and procedures.      VTE Risk Mitigation (From admission, onward)           Ordered     IP VTE HIGH RISK PATIENT  Once         02/15/25 1252     Place sequential compression device  Until discontinued         02/15/25 1252     Place sequential compression device  Until discontinued         02/15/25 0802                    Discharge Planning   MIKHAIL: 2/21/2025     Code Status: Full Code   Medical Readiness for Discharge Date:   Discharge Plan A: Home                Please place Justification for CONSTANTINO Barbour DO  Department of Hospital Medicine   [facility] - Med Surg

## 2025-02-21 NOTE — NURSING
Called IR for consult.     No answer.     Will call again.    Family is asking if 's can talk to family before giving pt. Information.

## 2025-02-21 NOTE — TELEPHONE ENCOUNTER
----- Message from Caridad sent at 2/21/2025  4:26 PM CST -----  Type:  Patient CallWho Called: [facility] case [facility] Case management Rosalio Thomas Does the patient know what this is regarding?: Requesting a call back about having a appointment scheduled for 2/28 for a staple removal ;please advise Best Call Back Number: [phone] Additional Information: Please call the patient

## 2025-02-21 NOTE — ASSESSMENT & PLAN NOTE
Hyponatremia is likely due to renal insufficiency. The patient's most recent sodium results are listed below.  Recent Labs     02/19/25  0127 02/20/25  0429 02/21/25  0526    132* 132*       Plan  - nephrology consulted  - Monitor sodium Daily.   - Patient hyponatremia is stable  - monitor closely

## 2025-02-21 NOTE — ASSESSMENT & PLAN NOTE
Patient with known CAD s/p stent placement and CABG, which is controlled Will continue Statin; hold aspirin and Plavix setting of impending pacing and monitor for S/Sx of angina/ACS. Continue to monitor on telemetry.     -repeat ischemic evaluation of after PPM; defer timing to cardiology  -cardiology follow-up outpatient  -patient with chest pain and elevated troponin of 3 on 02/19   -S/p heart catheterization on 02/19, [facility] with patent stents and moderate LAD/RCA disease - no culprit identified

## 2025-02-21 NOTE — SUBJECTIVE & OBJECTIVE
Interval History: this AM with severe nausea and vomiting, retching in room on assessment    Review of patient's allergies indicates:   Allergen Reactions    Novolin 70/30 (semi-synthetic) Nausea And Vomiting     Severe vomiting on Relion 70/30    Sulfa (sulfonamide antibiotics) Anaphylaxis    Talwin [pentazocine lactate] Anaphylaxis    Victoza [liraglutide] Nausea And Vomiting    Glipizide Nausea Only    Codeine     Influenza virus vaccines Hives    Iodine and iodide containing products Hives    Levetiracetam Itching    Lyrica [pregabalin] Hallucinations    Neurontin [gabapentin]      Possible associated myoclonic jerk    Rituxan [rituximab] Hives    Zoloft [sertraline] Nausea And Vomiting     Current Facility-Administered Medications   Medication Frequency    acetaminophen tablet 650 mg Q4H PRN    albuterol-ipratropium 2.5 mg-0.5 mg/3 mL nebulizer solution 3 mL Q4H PRN    aspirin EC tablet 81 mg Daily    atorvastatin tablet 80 mg QHS    ceFAZolin injection PRN    cefTRIAXone injection 2 g Q24H    dextrose 50% injection 12.5 g PRN    dextrose 50% injection 25 g PRN    famotidine (PF) injection PRN    fentaNYL 50 mcg/mL injection PRN    FLUoxetine capsule 40 mg Daily    [START ON 2/22/2025] furosemide tablet 40 mg BID    glucagon (human recombinant) injection 1 mg PRN    glucose chewable tablet 16 g PRN    glucose chewable tablet 24 g PRN    hydrALAZINE injection 10 mg Q4H PRN    hydrALAZINE tablet 100 mg Q8H    HYDROcodone-acetaminophen 5-325 mg per tablet 1 tablet Q4H PRN    HYDROmorphone (PF) injection 1 mg Q6H PRN    hydrOXYzine pamoate capsule 50 mg Q8H PRN    insulin aspart U-100 pen 0-10 Units QID (AC + HS) PRN    insulin aspart U-100 pen 5 Units TIDWM    insulin glargine U-100 (Lantus) pen 5 Units QHS    isosorbide mononitrate 24 hr tablet 60 mg Daily    LIDOcaine HCL 10 mg/ml (1%) injection PRN    LORazepam tablet 1 mg Daily PRN    melatonin tablet 6 mg Nightly PRN    metoprolol tartrate (LOPRESSOR) tablet  25 mg BID    nitroGLYCERIN SL tablet 0.4 mg Q5 Min PRN    ondansetron disintegrating tablet 8 mg Q8H PRN    ondansetron injection 4 mg Q4H PRN    ondansetron injection PRN    oxyCODONE immediate release tablet 10 mg Q4H PRN    pantoprazole EC tablet 40 mg Daily    pneumoc 20-michael conj-dip cr(PF) (PREVNAR-20 (PF)) injection Syrg 0.5 mL vaccine x 1 dose    simethicone chewable tablet 80 mg TID PRN    sodium chloride 0.9% flush 10 mL PRN    sodium chloride 0.9% flush 10 mL PRN    ticagrelor tablet 60 mg BID       Objective:     Vital Signs (Most Recent):  Temp: 97.6 °F (36.4 °C) (02/21/25 1041)  Pulse: 80 (02/21/25 1415)  Resp: 16 (02/21/25 1415)  BP: (!) 138/53 (02/21/25 1415)  SpO2: 99 % (02/21/25 1415) Vital Signs (24h Range):  Temp:  [97.3 °F (36.3 °C)-97.7 °F (36.5 °C)] 97.6 °F (36.4 °C)  Pulse:  [72-88] 80  Resp:  [16-20] 16  SpO2:  [96 %-100 %] 99 %  BP: ()/(51-70) 138/53     Weight: 85.9 kg (189 lb 6 oz) (02/17/25 1616)  Body mass index is 27.97 kg/m².  Body surface area is 2.04 meters squared.    I/O last 3 completed shifts:  In: 360 [P.O.:360]  Out: 550 [Urine:550]     Physical Exam  Constitutional:       General: She is not in acute distress.     Appearance: She is ill-appearing. She is not toxic-appearing.   HENT:      Nose: Nose normal. No congestion.      Mouth/Throat:      Mouth: Mucous membranes are moist.   Eyes:      Pupils: Pupils are equal, round, and reactive to light.   Cardiovascular:      Rate and Rhythm: Tachycardia present.      Heart sounds: Murmur heard.      Comments: Paced   Abdominal:      Tenderness: There is abdominal tenderness.   Musculoskeletal:      Right lower leg: Edema present.      Left lower leg: Edema present.   Skin:     Findings: Bruising present.   Neurological:      Mental Status: She is alert.          Significant Labs:  All labs within the past 24 hours have been reviewed.     Significant Imaging:  Labs: Reviewed

## 2025-02-21 NOTE — ASSESSMENT & PLAN NOTE
Baseline creatinine 2.0  On presentation creatinine 3.5    KYA due to cardiogenic shock secondary to complete heart block and initially improved. However developed NSTEMI and under went left heart cath 2/19  This AM - nausea and vomiting, sCr 4.2 and     Plan/Recommendation  -DC fluids, consented for dialysis. If able to place TDC please do and will start dialysis  -if unable to, monitor nausea and sx on zofran, ideally she tolerates sx and does not need a temporary line.  -Keep MAP > 65  -Keep hemoglobin > 7  -Strict ins and outs  -Avoid nephrotoxic agents if possible and renally dose medications  -Avoid drastic hemodynamic changes if possible    Hyponatremia   In setting of KYA, stable continue to monitor      Metabolic acidosis   Stable - continue to monitor

## 2025-02-21 NOTE — PT/OT/SLP PROGRESS
"Physical Therapy Treatment    Patient Name:  Lorena Contreras   MRN:  [id number]    Recommendations:     Discharge Recommendations: Low Intensity Therapy  Discharge Equipment Recommendations: none  Barriers to discharge: None    Assessment:     Lorena Contreras is a 74 y.o. female admitted with a medical diagnosis of Complete heart block.  She presents with the following impairments/functional limitations: weakness, impaired endurance, gait instability, decreased safety awareness, impaired functional mobility, decreased lower extremity function, impaired self care skills, decreased upper extremity function, decreased coordination, impaired balance.    Limited with treatment session this date due to N/V. LIT at time of discharge.     Rehab Prognosis: Fair; patient would benefit from acute skilled PT services to address these deficits and reach maximum level of function.    Recent Surgery: Procedure(s) (LRB):  Left heart cath (Left) 2 Days Post-Op    Plan:     During this hospitalization, patient to be seen  (3-5x/week) to address the identified rehab impairments via gait training, therapeutic activities, therapeutic exercises, neuromuscular re-education and progress toward the following goals:    Plan of Care Expires:  02/25/25    Subjective     Chief Complaint: nauseated  Patient/Family Comments/goals: " I keep saying I don't want lemon and they keep giving me lemon. It messes up my stomach."  Pain/Comfort:  Pain Rating 1:  (no pain just nauseated)      Objective:     Communicated with pt's nurse, Radha, prior to session.  Patient found HOB elevated with bed alarm, telemetry, PureWick, peripheral IV upon PT entry to room.     General Precautions: Standard, pacemaker, fall (edu on pacemaker precautions)  Orthopedic Precautions: N/A  Braces: N/A  Respiratory Status: Room air     Functional Mobility:   Bed Mobility:     Scooting: stand by assistance to scoot to EOB for foot placement  Supine to Sit: stand by " assistance  Sit to Supine: contact guard assistance  Transfers:  x2 from EOB, pt dry heaving with complaints of nausea and dizziness. Pt did start vomiting once returned to supine   Sit to Stand:  minimum assistance with rolling walker  Gait: Pt took ~3 sidesteps towards HOB with RW, CGA. Planned to ambulate to restroom, however, pt dry heaving with complaints of dizziness and feeling as if she needed to pass out. Once returned to supine with HOB elevated pt started vomiting. Pt's nurseRadha notified.  Balance: fair sitting and standing    Vitals taken once returned to supine:   BP: 130/54 mmHg  MAP: 79 mmHg  O2: 97%  HR: 86 BPM    AM-PAC 6 CLICK MOBILITY  Turning over in bed (including adjusting bedclothes, sheets and blankets)?: 3  Sitting down on and standing up from a chair with arms (e.g., wheelchair, bedside commode, etc.): 3  Moving from lying on back to sitting on the side of the bed?: 3  Moving to and from a bed to a chair (including a wheelchair)?: 3  Need to walk in hospital room?: 3  Climbing 3-5 steps with a railing?: 2  Basic Mobility Total Score: 17       Treatment & Education:  Pt educated on PTA role/POC.   Importance of OOB activity with staff assistance.  Importance of sitting up in the chair throughout the day as tolerated, especially for meals   Safety during functional t/f and mobility with use of AD and staff  White board updated   Multiple self-care tasks/functional mobility completed- assistance level noted above   All questions/concerns answered within scope of practice     Patient left  HOB fully elevated with emesis bag, tray table, room phone and all needs met/within reach  with all lines intact, call button in reach, bed alarm on, pt's nurseRadha, notified, and pt's nurse present..    GOALS:   Multidisciplinary Problems       Physical Therapy Goals          Problem: Physical Therapy    Goal Priority Disciplines Outcome Interventions   Physical Therapy Goal     PT, PT/OT  Progressing    Description: Goals to be met by: 25     Patient will increase functional independence with mobility by performin. Supine to sit with Stand-by Assistance  2. Sit to stand transfer with Stand-by Assistance  3. Gait  x 25-50 feet with Stand-by Assistance using Rolling Walker.   4. Ascend/descend 4 stair with right Handrails and Minimal  Assistance .   5. Lower extremity exercise program x10 reps per handout, with supervision                         DME Justifications:  No DME recommended requiring DME justifications    Time Tracking:     PT Received On: 25  PT Start Time: 951     PT Stop Time: 1015  PT Total Time (min): 24 min     Billable Minutes: Therapeutic Activity 24    Treatment Type: Treatment  PT/PTA: PTA     Number of PTA visits since last PT visit: 2025

## 2025-02-21 NOTE — PLAN OF CARE
Problem: Physical Therapy  Goal: Physical Therapy Goal  Description: Goals to be met by: 25     Patient will increase functional independence with mobility by performin. Supine to sit with Stand-by Assistance  2. Sit to stand transfer with Stand-by Assistance  3. Gait  x 25-50 feet with Stand-by Assistance using Rolling Walker.   4. Ascend/descend 4 stair with right Handrails and Minimal  Assistance .   5. Lower extremity exercise program x10 reps per handout, with supervision    Outcome: Progressing   Limited with treatment session this date due to N/V. LIT at time of discharge.

## 2025-02-21 NOTE — ASSESSMENT & PLAN NOTE
Patient's FSGs are controlled on current medication regimen.  Last A1c reviewed-   Lab Results   Component Value Date    HGBA1C 5.9 (H) 01/14/2025     Most recent fingerstick glucose reviewed-   Recent Labs   Lab 02/20/25  1641 02/20/25  1932 02/20/25  2334 02/21/25  0825   POCTGLUCOSE 102 65* 143* 107       Current correctional scale  Medium  Maintain anti-hyperglycemic dose as follows-   Antihyperglycemics (From admission, onward)    Start     Stop Route Frequency Ordered    02/21/25 2100  insulin glargine U-100 (Lantus) pen 5 Units         -- SubQ Nightly 02/21/25 0645    02/21/25 0715  insulin aspart U-100 pen 5 Units         -- SubQ 3 times daily with meals 02/21/25 0645    02/15/25 1358  insulin aspart U-100 pen 0-10 Units         -- SubQ Before meals & nightly PRN 02/15/25 1258        Hold Oral hypoglycemics while patient is in the hospital.; cardiac consistent carbohydrate diet; monitor blood glucose log and titrate to effect  Prandial aspart added

## 2025-02-21 NOTE — PLAN OF CARE
Problem: Adult Inpatient Plan of Care  Goal: Plan of Care Review  Outcome: Progressing  Goal: Patient-Specific Goal (Individualized)  Outcome: Progressing  Goal: Absence of Hospital-Acquired Illness or Injury  Outcome: Progressing  Goal: Optimal Comfort and Wellbeing  Outcome: Progressing  Goal: Readiness for Transition of Care  Outcome: Progressing     Problem: Diabetes Comorbidity  Goal: Blood Glucose Level Within Targeted Range  Outcome: Progressing     Problem: Acute Kidney Injury/Impairment  Goal: Fluid and Electrolyte Balance  Outcome: Progressing  Goal: Improved Oral Intake  Outcome: Progressing  Goal: Effective Renal Function  Outcome: Progressing     Problem: Fall Injury Risk  Goal: Absence of Fall and Fall-Related Injury  Outcome: Progressing     Problem: Skin Injury Risk Increased  Goal: Skin Health and Integrity  Outcome: Progressing     Problem: Infection  Goal: Absence of Infection Signs and Symptoms  Outcome: Progressing     Problem: Hemodialysis  Goal: Safe, Effective Therapy Delivery  Outcome: Progressing  Goal: Effective Tissue Perfusion  Outcome: Progressing  Goal: Absence of Infection Signs and Symptoms  Outcome: Progressing

## 2025-02-21 NOTE — PT/OT/SLP PROGRESS
Occupational Therapy      Patient Name:  Lorena Contreras   MRN:  [id number]    Patient not seen today secondary to Pt sitting with hob elevated and actively heaving. Deferring OT 2/2 Nausea/vomiting (Emesis bags and cool face cloth provided by OT.). Will follow-up later as able.    2/21/2025

## 2025-02-21 NOTE — PLAN OF CARE
Problem: Adult Inpatient Plan of Care  Goal: Plan of Care Review  2/20/2025 1839 by Suki Bautista RN  Outcome: Progressing  2/20/2025 1326 by Suki Bautista RN  Outcome: Progressing  Goal: Patient-Specific Goal (Individualized)  2/20/2025 1839 by Suki Bautista RN  Outcome: Progressing  2/20/2025 1326 by Suki Bautista RN  Outcome: Progressing  Goal: Absence of Hospital-Acquired Illness or Injury  2/20/2025 1839 by Suki Bautista RN  Outcome: Progressing  2/20/2025 1326 by Suki Bautista RN  Outcome: Progressing  Goal: Optimal Comfort and Wellbeing  2/20/2025 1839 by Suki Bautista RN  Outcome: Progressing  2/20/2025 1326 by Suki Bautista RN  Outcome: Progressing  Goal: Readiness for Transition of Care  2/20/2025 1839 by Suki Bautista RN  Outcome: Progressing  2/20/2025 1326 by Suki Bautista RN  Outcome: Progressing     Problem: Diabetes Comorbidity  Goal: Blood Glucose Level Within Targeted Range  2/20/2025 1839 by Suki Bautista RN  Outcome: Progressing  2/20/2025 1326 by Suki Bautista RN  Outcome: Progressing     Problem: Acute Kidney Injury/Impairment  Goal: Fluid and Electrolyte Balance  2/20/2025 1839 by Suki Bautista RN  Outcome: Progressing  2/20/2025 1326 by Suki Bautista RN  Outcome: Progressing  Goal: Improved Oral Intake  2/20/2025 1839 by Suki Bautista RN  Outcome: Progressing  2/20/2025 1326 by Suki Bautista RN  Outcome: Progressing  Goal: Effective Renal Function  2/20/2025 1839 by Suki Bautista RN  Outcome: Progressing  2/20/2025 1326 by Suki Bautista RN  Outcome: Progressing     Problem: Fall Injury Risk  Goal: Absence of Fall and Fall-Related Injury  2/20/2025 1839 by Suki Bautista RN  Outcome: Progressing  2/20/2025 1326 by Suki Bautista RN  Outcome: Progressing     Problem: Skin Injury Risk Increased  Goal: Skin Health and Integrity  2/20/2025 1839 by Suki Bautista RN  Outcome: Progressing  2/20/2025 1326 by Suki Bautista RN  Outcome:  It shows pt's gabapentin filled by Dr. Wiley on 4/23/17.    Pt informed   Progressing

## 2025-02-21 NOTE — ASSESSMENT & PLAN NOTE
KYA is likely due to pre-renal azotemia due to intravascular volume depletion. Baseline creatinine is  1.6 to 2.2 . Most recent creatinine and eGFR are listed below.  Recent Labs     02/19/25  0127 02/20/25  0429 02/21/25  0526   CREATININE 3.8* 4.1* 4.2*   EGFRNORACEVR 12* 11* 11*        Plan  - KYA is stable  - Avoid nephrotoxins and renally dose meds for GFR listed above  - Monitor urine output, serial BMP, and adjust therapy as needed  - nephrology consulted; input appreciated  - monitor closely

## 2025-02-21 NOTE — PROGRESS NOTES
[facility]  Nephrology  Progress Note    Patient Name: Lorena Contreras  MRN: [id number]  Admission Date: 2/15/2025  Hospital Length of Stay: 6 days  Attending Provider: Singh Barbour DO   Primary Care Physician: Jorge Paris MD  Principal Problem:Complete heart block    Subjective:     HPI: 72 yo F with PMHx of DM2, Fibromyalgia, lymphoma s/p chemo, HTN, HLD, CVA, MI, CAD s/p PCIs CKD3b, HFpEF, and anemia presented to  ED via EMS c/o chest pain radiating to the back x 1 day. Vitals on arrival HR 45bpm. Labs on arrival  Cr 3.5, BNP 1115, EKG complet hear block. CXR normal. Pt given asprin and nitro by ems prior to arrival.     Nephrology consulted for KYA on CKD 3b    Prior records obtained and reviewed. Baseline Cr 2, last at baseline 1/14/25. Cr on arrival 3.5. Pt state she does not have a nephrologist. Pt states she was doing well prior to yesterday. She denies nsaid use, change in uop, n/v.     Interval History: this AM with severe nausea and vomiting, retching in room on assessment    Review of patient's allergies indicates:   Allergen Reactions    Novolin 70/30 (semi-synthetic) Nausea And Vomiting     Severe vomiting on Relion 70/30    Sulfa (sulfonamide antibiotics) Anaphylaxis    Talwin [pentazocine lactate] Anaphylaxis    Victoza [liraglutide] Nausea And Vomiting    Glipizide Nausea Only    Codeine     Influenza virus vaccines Hives    Iodine and iodide containing products Hives    Levetiracetam Itching    Lyrica [pregabalin] Hallucinations    Neurontin [gabapentin]      Possible associated myoclonic jerk    Rituxan [rituximab] Hives    Zoloft [sertraline] Nausea And Vomiting     Current Facility-Administered Medications   Medication Frequency    acetaminophen tablet 650 mg Q4H PRN    albuterol-ipratropium 2.5 mg-0.5 mg/3 mL nebulizer solution 3 mL Q4H PRN    aspirin EC tablet 81 mg Daily    atorvastatin tablet 80 mg QHS    ceFAZolin injection PRN    cefTRIAXone injection 2 g Q24H     dextrose 50% injection 12.5 g PRN    dextrose 50% injection 25 g PRN    famotidine (PF) injection PRN    fentaNYL 50 mcg/mL injection PRN    FLUoxetine capsule 40 mg Daily    [START ON 2/22/2025] furosemide tablet 40 mg BID    glucagon (human recombinant) injection 1 mg PRN    glucose chewable tablet 16 g PRN    glucose chewable tablet 24 g PRN    hydrALAZINE injection 10 mg Q4H PRN    hydrALAZINE tablet 100 mg Q8H    HYDROcodone-acetaminophen 5-325 mg per tablet 1 tablet Q4H PRN    HYDROmorphone (PF) injection 1 mg Q6H PRN    hydrOXYzine pamoate capsule 50 mg Q8H PRN    insulin aspart U-100 pen 0-10 Units QID (AC + HS) PRN    insulin aspart U-100 pen 5 Units TIDWM    insulin glargine U-100 (Lantus) pen 5 Units QHS    isosorbide mononitrate 24 hr tablet 60 mg Daily    LIDOcaine HCL 10 mg/ml (1%) injection PRN    LORazepam tablet 1 mg Daily PRN    melatonin tablet 6 mg Nightly PRN    metoprolol tartrate (LOPRESSOR) tablet 25 mg BID    nitroGLYCERIN SL tablet 0.4 mg Q5 Min PRN    ondansetron disintegrating tablet 8 mg Q8H PRN    ondansetron injection 4 mg Q4H PRN    ondansetron injection PRN    oxyCODONE immediate release tablet 10 mg Q4H PRN    pantoprazole EC tablet 40 mg Daily    pneumoc 20-michael conj-dip cr(PF) (PREVNAR-20 (PF)) injection Syrg 0.5 mL vaccine x 1 dose    simethicone chewable tablet 80 mg TID PRN    sodium chloride 0.9% flush 10 mL PRN    sodium chloride 0.9% flush 10 mL PRN    ticagrelor tablet 60 mg BID       Objective:     Vital Signs (Most Recent):  Temp: 97.6 °F (36.4 °C) (02/21/25 1041)  Pulse: 80 (02/21/25 1415)  Resp: 16 (02/21/25 1415)  BP: (!) 138/53 (02/21/25 1415)  SpO2: 99 % (02/21/25 1415) Vital Signs (24h Range):  Temp:  [97.3 °F (36.3 °C)-97.7 °F (36.5 °C)] 97.6 °F (36.4 °C)  Pulse:  [72-88] 80  Resp:  [16-20] 16  SpO2:  [96 %-100 %] 99 %  BP: ()/(51-70) 138/53     Weight: 85.9 kg (189 lb 6 oz) (02/17/25 1616)  Body mass index is 27.97 kg/m².  Body surface area is 2.04 meters  squared.    I/O last 3 completed shifts:  In: 360 [P.O.:360]  Out: 550 [Urine:550]     Physical Exam  Constitutional:       General: She is not in acute distress.     Appearance: She is ill-appearing. She is not toxic-appearing.   HENT:      Nose: Nose normal. No congestion.      Mouth/Throat:      Mouth: Mucous membranes are moist.   Eyes:      Pupils: Pupils are equal, round, and reactive to light.   Cardiovascular:      Rate and Rhythm: Tachycardia present.      Heart sounds: Murmur heard.      Comments: Paced   Abdominal:      Tenderness: There is abdominal tenderness.   Musculoskeletal:      Right lower leg: Edema present.      Left lower leg: Edema present.   Skin:     Findings: Bruising present.   Neurological:      Mental Status: She is alert.          Significant Labs:  All labs within the past 24 hours have been reviewed.     Significant Imaging:  Labs: Reviewed  Assessment/Plan:     Renal/  Acute kidney injury superimposed on stage 3b chronic kidney disease  Baseline creatinine 2.0  On presentation creatinine 3.5    KYA due to cardiogenic shock secondary to complete heart block and initially improved. However developed NSTEMI and under went left heart cath 2/19  This AM - nausea and vomiting, sCr 4.2 and     Plan/Recommendation  -DC fluids, consented for dialysis. If able to place TDC please do and will start dialysis  -if unable to, monitor nausea and sx on zofran, ideally she tolerates sx and does not need a temporary line.  -Keep MAP > 65  -Keep hemoglobin > 7  -Strict ins and outs  -Avoid nephrotoxic agents if possible and renally dose medications  -Avoid drastic hemodynamic changes if possible    Hyponatremia   In setting of KYA, stable continue to monitor      Metabolic acidosis   Stable - continue to monitor        Thank you for your consult. I will follow-up with patient. Please contact us if you have any additional questions.    Alex Castañeda MD  Nephrology  [facility]

## 2025-02-21 NOTE — PHYSICIAN QUERY
Due to the conflicting clinical picture, please clinically validate the Cardiogenic Shock.   If validated, please provide additional clinical support for the diagnosis.     The condition is not confirmed and/or it has been ruled out

## 2025-02-21 NOTE — PLAN OF CARE
PARAS attempted to schedule Cardiology f/u for 2/28/2025 for staple removal.  , Caridad, sent a message to Dr. Rico' office advising of patient's need for a f/u for staple removal on 2/28/2025.  Patient to be contacted with the date and time of appt.

## 2025-02-21 NOTE — CONSULTS
Consult Note  Interventional Radiology    Reason for Consult: TDC placement    SUBJECTIVE:     Chief Complaint: KYA on CKD3    History of Present Illness: 74F PMHx  T2DM, lymphoma s/p chemotherapy, HTN, HLD, CVA, MI and CAD s/p PCI, CKD3b, HFpEF who presented with chest pain and was found to be in complete heart block. Also with worsening renal function. IR consulted for TDC placement. She is not NPO.     Past Medical History:   Diagnosis Date    Age-related osteoporosis with current pathological fracture with routine healing 11/13/2024    Allergy     Altered mental status 06/19/2022    DYSARTHRIA, SPASTIC MOVEMENTS & DIFFICULTY SWALLOWING    Anemia     Anxiety     Arthritis     Cataract     both removed    Colon polyps     Coronary artery disease     Depression     Diabetes mellitus, type II     Disorder of kidney and ureter     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/2023    Fibromyalgia     Follicular lymphoma     GERD (gastroesophageal reflux disease)     HTN (hypertension)     Hyperlipidemia     MI (myocardial infarction) 03/2019    Personal history of colonic polyps     Restless leg syndrome     Stroke      Past Surgical History:   Procedure Laterality Date    A-V CARDIAC PACEMAKER INSERTION N/A 2/17/2025    Procedure: INSERTION, CARDIAC PACEMAKER, DUAL CHAMBER;  Surgeon: Karl Rico MD;  Location: [facility] CATH LAB;  Service: Cardiology;  Laterality: N/A;    APPLICATION OF WOUND VACUUM-ASSISTED CLOSURE DEVICE Right 9/25/2024    Procedure: APPLICATION, WOUND VAC;  Surgeon: Neto Davalos MD;  Location: [facility] OR [address] [place] [address];  Service: Orthopedics;  Laterality: Right;    COLONOSCOPY  11/07/2012    Colon polyp found; repeat in 5 years    COLONOSCOPY N/A 11/4/2024    Procedure: COLONOSCOPY;  Surgeon: David Castaneda MD;  Location: [facility] ([address] [place] [address]);  Service: Endoscopy;  Laterality: N/A;    DEBRIDEMENT OF LOCAL FLAP Right 9/25/2024    Procedure: DEBRIDEMENT, LOCAL FLAP;  Surgeon: Neto Davalos  MD;  Location: [address];  Service: Orthopedics;  Laterality: Right;    ELBOW SURGERY Right 2015    dislocation repair     ESOPHAGOGASTRODUODENOSCOPY  11/07/2012    atrophic gastritis, H pylori testing negative    INCISION AND DRAINAGE FOOT Right 6/2/2021    Procedure: INCISION AND DRAINAGE, FOOT, bone biopsy;  Surgeon: Quiana Penn DPM;  Location: [facility] OR;  Service: Podiatry;  Laterality: Right;    IRRIGATION AND DEBRIDEMENT OF LOWER EXTREMITY Right 9/25/2024    Procedure: IRRIGATION AND DEBRIDEMENT, LOWER EXTREMITY; slider table, supine, bone foam, cysto tubing, 6L NS/dakins/peroxide, culture swabs;  Surgeon: Neto Davalos MD;  Location: [address];  Service: Orthopedics;  Laterality: Right;    KNEE SURGERY Bilateral 2015    scoped    LEFT HEART CATHETERIZATION Left 3/29/2019    Procedure: Left heart cath;  Surgeon: Bladimir Barbosa MD;  Location: [facility] CATH LAB;  Service: Cardiology;  Laterality: Left;    LEFT HEART CATHETERIZATION Left 11/18/2019    Procedure: Left heart cath;  Surgeon: Karl Rico MD;  Location: [facility] CATH LAB;  Service: Cardiology;  Laterality: Left;    LEFT HEART CATHETERIZATION Left 1/8/2020    Procedure: Left heart cath, right radial, noon start;  Surgeon: Christos Monreal MD;  Location: [facility] CATH LAB;  Service: Cardiology;  Laterality: Left;  RN Pre Op 1-6-20.  To be admitted 1-7-20 sor Aspirin Disensitation    LEFT HEART CATHETERIZATION Left 2/19/2025    Procedure: Left heart cath;  Surgeon: Karl Rico MD;  Location: [facility] CATH LAB;  Service: Cardiology;  Laterality: Left;    OPEN REDUCTION AND INTERNAL FIXATION (ORIF) OF FRACTURE OF ACETABULUM Right 8/16/2024    Procedure: ORIF, FRACTURE, ACETABULUM;  Surgeon: Neto Davalos MD;  Location: [address];  Service: Orthopedics;  Laterality: Right;  anterior and lateral pelvic incisions    OPEN REDUCTION AND INTERNAL FIXATION (ORIF) OF PILON FRACTURE Right 8/14/2024    Procedure: ORIF,  FRACTURE, PILON;  Surgeon: Neto Davalos MD;  Location: [facility] OR [address] [place] [address];  Service: Orthopedics;  Laterality: Right;    TONSILLECTOMY  1955    ULTRASOUND GUIDANCE  1/8/2020    Procedure: Ultrasound Guidance;  Surgeon: Christos Monreal MD;  Location: [facility] CATH LAB;  Service: Cardiology;;     Family History   Problem Relation Name Age of Onset    Cancer Mother          colon    Heart disease Mother      Cancer Father          lung    Lung cancer Brother      Diabetes Sister      Hypertension Sister      Allergy (severe) Daughter erin     No Known Problems Daughter      Stroke Neg Hx      Hyperlipidemia Neg Hx       Social History[1]    Review of Systems:  Constitutional/General:No fever, chills, change in appetite or weight loss.  Hematological/Immuno: no known coagulopathies  Respiratory: no shortness of breath  Cardiovascular: no chest pain  Gastrointestinal: no abdominal pain  Genito-Urinary: no dysuria  Musculoskeletal: negative  Skin: Negative for rash, itching, pigmentation changes, nail or hair changes.  Neurological: no TIA or stroke symptoms  Psychiatric: normal mood/affect, good insight/judgement      OBJECTIVE:     Vital Signs Range (Last 24H):  Temp:  [97.3 °F (36.3 °C)-97.7 °F (36.5 °C)]   Pulse:  [72-88]   Resp:  [18]   BP: ()/(51-70)   SpO2:  [96 %-100 %]     Physical Exam:  General- Patient AAO x3 in NAD  ENT- EOMI  Neck- No masses  CV- Normal rate  Resp-  No increased WOB  GI- Non-distended  Extrem- No cyanosis, clubbing, edema  Derm- No rashes, masses, or lesions noted  Neuro-  No focal deficits noted    Physical Exam  Body mass index is 27.97 kg/m².    Scheduled Meds:    aspirin  81 mg Oral Daily    atorvastatin  80 mg Oral QHS    cefTRIAXone (Rocephin) IV (PEDS and ADULTS)  2 g Intravenous Q24H    FLUoxetine  40 mg Oral Daily    [START ON 2/22/2025] furosemide  40 mg Oral BID    hydrALAZINE  100 mg Oral Q8H    insulin aspart U-100  5 Units Subcutaneous TIDWM    insulin glargine  "U-100 (Lantus)  5 Units Subcutaneous QHS    isosorbide mononitrate  60 mg Oral Daily    metoprolol tartrate  25 mg Oral BID    pantoprazole  40 mg Oral Daily    ticagrelor  60 mg Oral BID     Continuous Infusions:   PRN Meds:  Current Facility-Administered Medications:     acetaminophen, 650 mg, Oral, Q4H PRN    albuterol-ipratropium, 3 mL, Nebulization, Q4H PRN    dextrose 50%, 12.5 g, Intravenous, PRN    dextrose 50%, 25 g, Intravenous, PRN    glucagon (human recombinant), 1 mg, Intramuscular, PRN    glucose, 16 g, Oral, PRN    glucose, 24 g, Oral, PRN    hydrALAZINE, 10 mg, Intravenous, Q4H PRN    HYDROcodone-acetaminophen, 1 tablet, Oral, Q4H PRN    HYDROmorphone, 1 mg, Intravenous, Q6H PRN    hydrOXYzine pamoate, 50 mg, Oral, Q8H PRN    insulin aspart U-100, 0-10 Units, Subcutaneous, QID (AC + HS) PRN    LORazepam, 1 mg, Oral, Daily PRN    melatonin, 6 mg, Oral, Nightly PRN    nitroGLYCERIN, 0.4 mg, Sublingual, Q5 Min PRN    ondansetron, 8 mg, Oral, Q8H PRN    ondansetron, 4 mg, Intravenous, Q4H PRN    oxyCODONE, 10 mg, Oral, Q4H PRN    pneumoc 20-michael conj-dip cr(PF), 0.5 mL, Intramuscular, vaccine x 1 dose    simethicone, 1 tablet, Oral, TID PRN    sodium chloride 0.9%, 10 mL, Intravenous, PRN    sodium chloride 0.9%, 10 mL, Intravenous, PRN    Allergies:   Review of patient's allergies indicates:   Allergen Reactions    Novolin 70/30 (semi-synthetic) Nausea And Vomiting     Severe vomiting on Relion 70/30    Sulfa (sulfonamide antibiotics) Anaphylaxis    Talwin [pentazocine lactate] Anaphylaxis    Victoza [liraglutide] Nausea And Vomiting    Glipizide Nausea Only    Codeine     Influenza virus vaccines Hives    Iodine and iodide containing products Hives    Levetiracetam Itching    Lyrica [pregabalin] Hallucinations    Neurontin [gabapentin]      Possible associated myoclonic jerk    Rituxan [rituximab] Hives    Zoloft [sertraline] Nausea And Vomiting       Labs:  No results for input(s): "INR", "PT", "PTT" " in the last 168 hours.    Recent Labs   Lab 02/20/25  0429   WBC 12.11   HGB 8.5*   HCT 27.4*   MCV 94         Recent Labs   Lab 02/15/25  0611 02/16/25  1114 02/17/25  1200 02/18/25  0726 02/21/25  0526   *   < > 212*   < > 74      < > 133*   < > 132*   K 4.3   < > 5.0   < > 4.2      < > 106   < > 103   CO2 15*   < > 13*   < > 17*   BUN 83*   < > 100*   < > 107*   CREATININE 3.5*   < > 4.1*   < > 4.2*   CALCIUM 8.8   < > 8.7   < > 8.4*   ALT 42  --   --   --   --    AST 44*  --   --   --   --    ALBUMIN 3.3*   < > 3.1*  --   --    BILITOT 0.2  --   --   --   --     < > = values in this interval not displayed.     CXR 2/19/25 personally reviewed and demonstrates borderline enlarged cardiac silhouette, left sided cardiac device, and prominence of the pulmonary interstitium which may reflect pulmonary edema.     Vitals (Most Recent):  Temp: 97.6 °F (36.4 °C) (02/21/25 1041)  Pulse: 74 (02/21/25 1113)  Resp: 18 (02/21/25 1041)  BP: (!) 126/58 (02/21/25 1041)  SpO2: 96 % (02/21/25 1041)    ASA: 3  Mallampati: N/A    Consent obtained.     ASSESSMENT/PLAN:     74F PMHx  T2DM, lymphoma s/p chemotherapy, HTN, HLD, CVA, MI and CAD s/p PCI, CKD3b, HFpEF with KYA on CKD3.  - TDC placement with Fentanyl + local anesthesia    Active Hospital Problems    Diagnosis  POA    *Complete heart block [I44.2]  Yes    Hyponatremia [E87.1]  Yes    Cardiac pacemaker in situ [Z95.0]  No     [employer] dual pacemaker 2/17/25 for CHB      Acute kidney injury superimposed on stage 3b chronic kidney disease [N17.9, N18.32]  Yes    Diabetes mellitus [E11.9]  Yes    Aortic stenosis [I35.0]  Yes    Coronary artery disease of native artery of native heart with stable angina pectoris [I25.118]  Yes    Mixed hyperlipidemia [E78.2]  Yes    Anxiety [F41.9]  Yes     Chronic      Resolved Hospital Problems   No resolved problems to display.       Discussed how the procedure will be performed, risk/benefits, possible complications,  pre-post procedure expectations, and alternatives. The patient voices understanding and all questions have been answered.  The patient agrees to proceed as planned.    Liv Miller MD         [1]   Social History  Tobacco Use    Smoking status: Never    Smokeless tobacco: Never   Substance Use Topics    Alcohol use: Not Currently    Drug use: Never

## 2025-02-21 NOTE — PROCEDURES
Interventional Radiology Post-Procedure Note    Pre Op Diagnosis: KYA on CKD    Post Op Diagnosis: Same    Procedure: Tunneled central venous catheter placement    Procedure performed by:  Liv Miller MD    Written Informed Consent Obtained: Yes    Specimen Removed: No    Estimated Blood Loss:  Minimal     Findings:   The right internal jugular vein is sonographically patent.  Successful placement of right-sided tunneled 14.5 Fr x 19 cm central venous catheter with catheter tip in the right atrium.     The patient tolerated the procedure without complication. The central venous catheter is ready for immediate use.     Liv Miller MD  Interventional Radiology

## 2025-02-21 NOTE — PLAN OF CARE
Problem: Adult Inpatient Plan of Care  Goal: Plan of Care Review  Outcome: Progressing     Problem: Diabetes Comorbidity  Goal: Blood Glucose Level Within Targeted Range  Outcome: Progressing     Problem: Acute Kidney Injury/Impairment  Goal: Fluid and Electrolyte Balance  Outcome: Progressing  Goal: Improved Oral Intake  Outcome: Progressing  Goal: Effective Renal Function  Outcome: Progressing     Problem: Fall Injury Risk  Goal: Absence of Fall and Fall-Related Injury  Outcome: Progressing

## 2025-02-21 NOTE — SUBJECTIVE & OBJECTIVE
Interval History:  No acute overnight events.  Patient remained afebrile.  Much calmer this morning.  Still with some anxiety, but significantly calmer compared to yesterday.  Currently denies any pain.  Wants to try regular diet.    Review of Systems   Constitutional:  Negative for fatigue and fever.   Eyes:  Negative for visual disturbance.   Respiratory:  Negative for cough, chest tightness and shortness of breath.    Cardiovascular:  Negative for chest pain and palpitations.   Gastrointestinal:  Negative for abdominal pain, nausea and vomiting.   Genitourinary:  Negative for difficulty urinating, dysuria, frequency and urgency.   Musculoskeletal:  Negative for neck pain.   Neurological:  Negative for dizziness and headaches.   Psychiatric/Behavioral:  Negative for confusion. The patient is nervous/anxious.      Objective:     Vital Signs (Most Recent):  Temp: 97.3 °F (36.3 °C) (02/21/25 0827)  Pulse: 87 (02/21/25 0827)  Resp: 18 (02/21/25 0827)  BP: (!) 147/63 (02/21/25 0827)  SpO2: 98 % (02/21/25 0827) Vital Signs (24h Range):  Temp:  [97.3 °F (36.3 °C)-97.7 °F (36.5 °C)] 97.3 °F (36.3 °C)  Pulse:  [64-88] 87  Resp:  [15-18] 18  SpO2:  [98 %-100 %] 98 %  BP: ()/(51-70) 147/63     Weight: 85.9 kg (189 lb 6 oz)  Body mass index is 27.97 kg/m².    Intake/Output Summary (Last 24 hours) at 2/21/2025 0935  Last data filed at 2/21/2025 0615  Gross per 24 hour   Intake 240 ml   Output 550 ml   Net -310 ml         Physical Exam  Vitals and nursing note reviewed.   Constitutional:       General: She is not in acute distress.     Appearance: She is ill-appearing.   HENT:      Mouth/Throat:      Mouth: Mucous membranes are moist.   Cardiovascular:      Rate and Rhythm: Normal rate and regular rhythm.      Pulses: Normal pulses.   Pulmonary:      Effort: Pulmonary effort is normal. No respiratory distress.      Breath sounds: Normal breath sounds. No wheezing or rales.      Comments: On room air.  Clean surgical  dressing on left anterior chest wall  Abdominal:      General: There is no distension.      Palpations: Abdomen is soft.      Tenderness: There is no abdominal tenderness.   Musculoskeletal:      Cervical back: No tenderness.      Right lower leg: No edema.      Left lower leg: No edema.   Neurological:      General: No focal deficit present.      Mental Status: She is alert and oriented to person, place, and time.   Psychiatric:         Mood and Affect: Mood is anxious.      Comments: Patient anxious on exam, but much calmer compared to yesterday             Significant Labs: All pertinent labs within the past 24 hours have been reviewed.    Significant Imaging: I have reviewed all pertinent imaging results/findings within the past 24 hours.

## 2025-02-22 LAB
ANION GAP SERPL CALC-SCNC: 12 MMOL/L (ref 8–16)
APTT PPP: 28.2 SEC (ref 21–32)
BASOPHILS # BLD AUTO: 0.04 K/UL (ref 0–0.2)
BASOPHILS NFR BLD: 0.5 % (ref 0–1.9)
BUN SERPL-MCNC: 72 MG/DL (ref 8–23)
CALCIUM SERPL-MCNC: 8.2 MG/DL (ref 8.7–10.5)
CHLORIDE SERPL-SCNC: 102 MMOL/L (ref 95–110)
CO2 SERPL-SCNC: 20 MMOL/L (ref 23–29)
CREAT SERPL-MCNC: 3 MG/DL (ref 0.5–1.4)
DIFFERENTIAL METHOD BLD: ABNORMAL
EOSINOPHIL # BLD AUTO: 0.1 K/UL (ref 0–0.5)
EOSINOPHIL NFR BLD: 1 % (ref 0–8)
ERYTHROCYTE [DISTWIDTH] IN BLOOD BY AUTOMATED COUNT: 15.1 % (ref 11.5–14.5)
EST. GFR  (NO RACE VARIABLE): 16 ML/MIN/1.73 M^2
GLUCOSE SERPL-MCNC: 112 MG/DL (ref 70–110)
HBV SURFACE AB SER-ACNC: <3 MIU/ML
HBV SURFACE AB SER-ACNC: NORMAL M[IU]/ML
HBV SURFACE AG SERPL QL IA: NORMAL
HCT VFR BLD AUTO: 25.8 % (ref 37–48.5)
HGB BLD-MCNC: 8.3 G/DL (ref 12–16)
IMM GRANULOCYTES # BLD AUTO: 0.03 K/UL (ref 0–0.04)
IMM GRANULOCYTES NFR BLD AUTO: 0.4 % (ref 0–0.5)
INR PPP: 1.1 (ref 0.8–1.2)
LYMPHOCYTES # BLD AUTO: 1.1 K/UL (ref 1–4.8)
LYMPHOCYTES NFR BLD: 13.4 % (ref 18–48)
MCH RBC QN AUTO: 29.3 PG (ref 27–31)
MCHC RBC AUTO-ENTMCNC: 32.2 G/DL (ref 32–36)
MCV RBC AUTO: 91 FL (ref 82–98)
MONOCYTES # BLD AUTO: 0.9 K/UL (ref 0.3–1)
MONOCYTES NFR BLD: 10.8 % (ref 4–15)
NEUTROPHILS # BLD AUTO: 5.8 K/UL (ref 1.8–7.7)
NEUTROPHILS NFR BLD: 73.9 % (ref 38–73)
NRBC BLD-RTO: 0 /100 WBC
PLATELET # BLD AUTO: 212 K/UL (ref 150–450)
PMV BLD AUTO: 12.3 FL (ref 9.2–12.9)
POCT GLUCOSE: 118 MG/DL (ref 70–110)
POCT GLUCOSE: 159 MG/DL (ref 70–110)
POCT GLUCOSE: 163 MG/DL (ref 70–110)
POCT GLUCOSE: 166 MG/DL (ref 70–110)
POTASSIUM SERPL-SCNC: 3.8 MMOL/L (ref 3.5–5.1)
PROTHROMBIN TIME: 12.1 SEC (ref 9–12.5)
RBC # BLD AUTO: 2.83 M/UL (ref 4–5.4)
SODIUM SERPL-SCNC: 134 MMOL/L (ref 136–145)
WBC # BLD AUTO: 7.89 K/UL (ref 3.9–12.7)

## 2025-02-22 PROCEDURE — 85025 COMPLETE CBC W/AUTO DIFF WBC: CPT | Mod: HCNC | Performed by: HOSPITALIST

## 2025-02-22 PROCEDURE — 25000003 PHARM REV CODE 250: Mod: HCNC | Performed by: STUDENT IN AN ORGANIZED HEALTH CARE EDUCATION/TRAINING PROGRAM

## 2025-02-22 PROCEDURE — 25000242 PHARM REV CODE 250 ALT 637 W/ HCPCS: Mod: HCNC | Performed by: INTERNAL MEDICINE

## 2025-02-22 PROCEDURE — 90935 HEMODIALYSIS ONE EVALUATION: CPT | Mod: HCNC

## 2025-02-22 PROCEDURE — 21400001 HC TELEMETRY ROOM: Mod: HCNC

## 2025-02-22 PROCEDURE — 25000003 PHARM REV CODE 250: Mod: HCNC | Performed by: HOSPITALIST

## 2025-02-22 PROCEDURE — 99232 SBSQ HOSP IP/OBS MODERATE 35: CPT | Mod: HCNC,,, | Performed by: STUDENT IN AN ORGANIZED HEALTH CARE EDUCATION/TRAINING PROGRAM

## 2025-02-22 PROCEDURE — 36415 COLL VENOUS BLD VENIPUNCTURE: CPT | Mod: HCNC | Performed by: HOSPITALIST

## 2025-02-22 PROCEDURE — 85730 THROMBOPLASTIN TIME PARTIAL: CPT | Mod: HCNC | Performed by: HOSPITALIST

## 2025-02-22 PROCEDURE — 87340 HEPATITIS B SURFACE AG IA: CPT | Mod: HCNC | Performed by: STUDENT IN AN ORGANIZED HEALTH CARE EDUCATION/TRAINING PROGRAM

## 2025-02-22 PROCEDURE — 94799 UNLISTED PULMONARY SVC/PX: CPT | Mod: HCNC

## 2025-02-22 PROCEDURE — 63600175 PHARM REV CODE 636 W HCPCS: Mod: HCNC | Performed by: STUDENT IN AN ORGANIZED HEALTH CARE EDUCATION/TRAINING PROGRAM

## 2025-02-22 PROCEDURE — 36415 COLL VENOUS BLD VENIPUNCTURE: CPT | Mod: HCNC | Performed by: STUDENT IN AN ORGANIZED HEALTH CARE EDUCATION/TRAINING PROGRAM

## 2025-02-22 PROCEDURE — 80048 BASIC METABOLIC PNL TOTAL CA: CPT | Mod: HCNC | Performed by: STUDENT IN AN ORGANIZED HEALTH CARE EDUCATION/TRAINING PROGRAM

## 2025-02-22 PROCEDURE — 25000003 PHARM REV CODE 250: Mod: HCNC | Performed by: EMERGENCY MEDICINE

## 2025-02-22 PROCEDURE — 86706 HEP B SURFACE ANTIBODY: CPT | Mod: HCNC | Performed by: STUDENT IN AN ORGANIZED HEALTH CARE EDUCATION/TRAINING PROGRAM

## 2025-02-22 PROCEDURE — 85610 PROTHROMBIN TIME: CPT | Mod: HCNC | Performed by: HOSPITALIST

## 2025-02-22 PROCEDURE — 25000003 PHARM REV CODE 250: Mod: HCNC | Performed by: INTERNAL MEDICINE

## 2025-02-22 RX ORDER — L. ACIDOPHILUS/L.BULGARICUS 100MM CELL
1 GRANULES IN PACKET (EA) ORAL 2 TIMES DAILY
Status: DISCONTINUED | OUTPATIENT
Start: 2025-02-22 | End: 2025-02-25 | Stop reason: HOSPADM

## 2025-02-22 RX ORDER — LOPERAMIDE HYDROCHLORIDE 2 MG/1
2 CAPSULE ORAL ONCE
Status: COMPLETED | OUTPATIENT
Start: 2025-02-22 | End: 2025-02-22

## 2025-02-22 RX ORDER — SODIUM CHLORIDE 9 MG/ML
INJECTION, SOLUTION INTRAVENOUS ONCE
Status: CANCELLED | OUTPATIENT
Start: 2025-02-22 | End: 2025-02-22

## 2025-02-22 RX ADMIN — METOPROLOL TARTRATE 25 MG: 25 TABLET, FILM COATED ORAL at 09:02

## 2025-02-22 RX ADMIN — ASPIRIN 81 MG: 81 TABLET, COATED ORAL at 09:02

## 2025-02-22 RX ADMIN — HYDRALAZINE HYDROCHLORIDE 100 MG: 25 TABLET ORAL at 06:02

## 2025-02-22 RX ADMIN — TICAGRELOR 60 MG: 60 TABLET ORAL at 09:02

## 2025-02-22 RX ADMIN — HYDROXYZINE PAMOATE 50 MG: 25 CAPSULE ORAL at 04:02

## 2025-02-22 RX ADMIN — HYDROCODONE BITARTRATE AND ACETAMINOPHEN 1 TABLET: 5; 325 TABLET ORAL at 04:02

## 2025-02-22 RX ADMIN — LOPERAMIDE HYDROCHLORIDE 2 MG: 2 CAPSULE ORAL at 09:02

## 2025-02-22 RX ADMIN — LACTOBACILLUS ACIDOPHILUS / LACTOBACILLUS BULGARICUS 1 EACH: 100 MILLION CFU STRENGTH GRANULES at 09:02

## 2025-02-22 RX ADMIN — FUROSEMIDE 40 MG: 40 TABLET ORAL at 09:02

## 2025-02-22 RX ADMIN — LORAZEPAM 1 MG: 0.5 TABLET ORAL at 04:02

## 2025-02-22 RX ADMIN — ISOSORBIDE MONONITRATE 60 MG: 30 TABLET, EXTENDED RELEASE ORAL at 09:02

## 2025-02-22 RX ADMIN — FLUOXETINE HYDROCHLORIDE 40 MG: 20 CAPSULE ORAL at 09:02

## 2025-02-22 RX ADMIN — INSULIN GLARGINE 5 UNITS: 100 INJECTION, SOLUTION SUBCUTANEOUS at 09:02

## 2025-02-22 RX ADMIN — INSULIN ASPART 1 UNITS: 100 INJECTION, SOLUTION INTRAVENOUS; SUBCUTANEOUS at 09:02

## 2025-02-22 RX ADMIN — ATORVASTATIN CALCIUM 80 MG: 40 TABLET, FILM COATED ORAL at 09:02

## 2025-02-22 RX ADMIN — CEFTRIAXONE 2 G: 2 INJECTION, POWDER, FOR SOLUTION INTRAMUSCULAR; INTRAVENOUS at 04:02

## 2025-02-22 RX ADMIN — PANTOPRAZOLE SODIUM 40 MG: 40 TABLET, DELAYED RELEASE ORAL at 09:02

## 2025-02-22 RX ADMIN — HYDRALAZINE HYDROCHLORIDE 100 MG: 25 TABLET ORAL at 09:02

## 2025-02-22 RX ADMIN — OXYCODONE 10 MG: 5 TABLET ORAL at 09:02

## 2025-02-22 NOTE — NURSING
Pt draining moderate amount of bright red blood from dialysis port placed yesterday. Dressing initially changed by dialysis nurse and reinforced by Charge Nurse Mikayla. Insertion site continues to ooze despite efforts to reinforce dressings to insertion site. ABD pad applied w tape to assist in stopping the bleeding.

## 2025-02-22 NOTE — PT/OT/SLP PROGRESS
Physical Therapy      Patient Name:  Lorena Contreras   MRN:  [id number]    Patient not seen today secondary to patient unwilling to participate as pt reports that she is still not feeling well. Will follow-up as able.

## 2025-02-22 NOTE — ASSESSMENT & PLAN NOTE
Hyponatremia is likely due to renal insufficiency. The patient's most recent sodium results are listed below.  Recent Labs     02/20/25  0429 02/21/25  0526 02/22/25  0447   * 132* 134*       Plan  - nephrology consulted  - Monitor sodium Daily.   - Patient hyponatremia is stable  - monitor closely

## 2025-02-22 NOTE — SUBJECTIVE & OBJECTIVE
Interval History:  No acute overnight events.  Patient remained afebrile.  Tolerated dialysis yesterday.  States she still feels unwell.  Has some bleeding around the TH DC site.  Discussed with IR, recommend to apply pressure.  Nephrology plan to take patient for 2nd dialysis session today    Review of Systems   Constitutional:  Negative for fatigue and fever.   Respiratory:  Negative for cough, chest tightness and shortness of breath.    Cardiovascular:  Negative for chest pain and palpitations.   Gastrointestinal:  Positive for nausea. Negative for abdominal pain and vomiting.   Genitourinary:  Negative for difficulty urinating, dysuria, frequency and urgency.   Musculoskeletal:  Negative for neck pain.   Neurological:  Negative for dizziness and headaches.   Psychiatric/Behavioral:  Negative for confusion. The patient is nervous/anxious.      Objective:     Vital Signs (Most Recent):  Temp: 97.6 °F (36.4 °C) (02/22/25 0951)  Pulse: 74 (02/22/25 1255)  Resp: 18 (02/22/25 0951)  BP: (!) 125/50 (02/22/25 1255)  SpO2: 97 % (02/22/25 0733) Vital Signs (24h Range):  Temp:  [97.4 °F (36.3 °C)-98.3 °F (36.8 °C)] 97.6 °F (36.4 °C)  Pulse:  [61-85] 74  Resp:  [16-21] 18  SpO2:  [97 %-99 %] 97 %  BP: (113-156)/(40-79) 125/50     Weight: 85.9 kg (189 lb 6 oz)  Body mass index is 27.97 kg/m².    Intake/Output Summary (Last 24 hours) at 2/22/2025 1446  Last data filed at 2/22/2025 0919  Gross per 24 hour   Intake 240 ml   Output 1001 ml   Net -761 ml         Physical Exam  Vitals and nursing note reviewed.   Constitutional:       General: She is not in acute distress.     Appearance: She is ill-appearing.   HENT:      Mouth/Throat:      Mouth: Mucous membranes are moist.   Cardiovascular:      Rate and Rhythm: Normal rate and regular rhythm.      Pulses: Normal pulses.   Pulmonary:      Effort: Pulmonary effort is normal. No respiratory distress.      Breath sounds: Normal breath sounds. No wheezing or rales.      Comments:  On room air.  Clean surgical dressing on left anterior chest wall  Abdominal:      General: There is no distension.      Palpations: Abdomen is soft.      Tenderness: There is abdominal tenderness.   Musculoskeletal:      Cervical back: No tenderness.      Right lower leg: No edema.      Left lower leg: No edema.      Comments: TH DC on right upper chest wall.  Dressing with blood.  Nontender on exam   Neurological:      General: No focal deficit present.      Mental Status: She is alert and oriented to person, place, and time.   Psychiatric:         Mood and Affect: Mood is anxious.      Comments: Patient anxious on exam,             Significant Labs: All pertinent labs within the past 24 hours have been reviewed.    Significant Imaging: I have reviewed all pertinent imaging results/findings within the past 24 hours.

## 2025-02-22 NOTE — ASSESSMENT & PLAN NOTE
Baseline creatinine 2.0  On presentation creatinine 3.5    KYA due to cardiogenic shock secondary to complete heart block and initially improved. However developed NSTEMI and under went left heart cath 2/19  HD initiated 2/22    Plan/Recommendation  -HD session 2 today  -obtain HD chair labs and have SW/CM place referrals for HD, appreciate assistance  -Keep MAP > 65  -Keep hemoglobin > 7  -Strict ins and outs  -Avoid nephrotoxic agents if possible and renally dose medications  -Avoid drastic hemodynamic changes if possible    Hyponatremia   Now on HD    Metabolic acidosis   Now on hD

## 2025-02-22 NOTE — SUBJECTIVE & OBJECTIVE
Interval History: still reports feeling ill today, plan for second session of HD today    Review of patient's allergies indicates:   Allergen Reactions    Novolin 70/30 (semi-synthetic) Nausea And Vomiting     Severe vomiting on Relion 70/30    Sulfa (sulfonamide antibiotics) Anaphylaxis    Talwin [pentazocine lactate] Anaphylaxis    Victoza [liraglutide] Nausea And Vomiting    Glipizide Nausea Only    Codeine     Influenza virus vaccines Hives    Iodine and iodide containing products Hives    Levetiracetam Itching    Lyrica [pregabalin] Hallucinations    Neurontin [gabapentin]      Possible associated myoclonic jerk    Rituxan [rituximab] Hives    Zoloft [sertraline] Nausea And Vomiting     Current Facility-Administered Medications   Medication Frequency    acetaminophen tablet 650 mg Q4H PRN    albuterol-ipratropium 2.5 mg-0.5 mg/3 mL nebulizer solution 3 mL Q4H PRN    aspirin EC tablet 81 mg Daily    atorvastatin tablet 80 mg QHS    cefTRIAXone injection 2 g Q24H    dextrose 50% injection 12.5 g PRN    dextrose 50% injection 25 g PRN    FLUoxetine capsule 40 mg Daily    furosemide tablet 40 mg BID    glucagon (human recombinant) injection 1 mg PRN    glucose chewable tablet 16 g PRN    glucose chewable tablet 24 g PRN    hydrALAZINE injection 10 mg Q4H PRN    hydrALAZINE tablet 100 mg Q8H    HYDROcodone-acetaminophen 5-325 mg per tablet 1 tablet Q4H PRN    HYDROmorphone (PF) injection 1 mg Q6H PRN    hydrOXYzine pamoate capsule 50 mg Q8H PRN    insulin aspart U-100 pen 0-10 Units QID (AC + HS) PRN    insulin aspart U-100 pen 5 Units TIDWM    insulin glargine U-100 (Lantus) pen 5 Units QHS    isosorbide mononitrate 24 hr tablet 60 mg Daily    LORazepam tablet 1 mg Daily PRN    melatonin tablet 6 mg Nightly PRN    metoprolol tartrate (LOPRESSOR) tablet 25 mg BID    nitroGLYCERIN SL tablet 0.4 mg Q5 Min PRN    ondansetron disintegrating tablet 8 mg Q8H PRN    ondansetron injection 4 mg Q4H PRN    oxyCODONE immediate  release tablet 10 mg Q4H PRN    pantoprazole EC tablet 40 mg Daily    pneumoc 20-michael conj-dip cr(PF) (PREVNAR-20 (PF)) injection Syrg 0.5 mL vaccine x 1 dose    promethazine (PHENERGAN) 6.25 mg in 0.9% NaCl 50 mL IVPB Q6H PRN    simethicone chewable tablet 80 mg TID PRN    sodium chloride 0.9% flush 10 mL PRN    sodium chloride 0.9% flush 10 mL PRN    ticagrelor tablet 60 mg BID       Objective:     Vital Signs (Most Recent):  Temp: 98.1 °F (36.7 °C) (02/22/25 0733)  Pulse: 77 (02/22/25 1122)  Resp: 18 (02/22/25 0927)  BP: (!) 113/48 (02/22/25 0733)  SpO2: 97 % (02/22/25 0733) Vital Signs (24h Range):  Temp:  [97.4 °F (36.3 °C)-98.3 °F (36.8 °C)] 98.1 °F (36.7 °C)  Pulse:  [61-83] 77  Resp:  [15-21] 18  SpO2:  [97 %-100 %] 97 %  BP: (113-156)/(48-79) 113/48     Weight: 85.9 kg (189 lb 6 oz) (02/17/25 1616)  Body mass index is 27.97 kg/m².  Body surface area is 2.04 meters squared.    I/O last 3 completed shifts:  In: 0   Out: 1200 [Urine:200; Other:1000]     Physical Exam  Constitutional:       General: She is not in acute distress.     Appearance: She is not ill-appearing or toxic-appearing.   HENT:      Nose: Nose normal. No congestion.      Mouth/Throat:      Mouth: Mucous membranes are moist.   Eyes:      Pupils: Pupils are equal, round, and reactive to light.   Cardiovascular:      Rate and Rhythm: Tachycardia present.      Heart sounds: Murmur heard.      Comments: TDC right chest wall  Abdominal:      Tenderness: There is abdominal tenderness.   Musculoskeletal:      Right lower leg: Edema present.      Left lower leg: Edema present.   Skin:     Findings: Bruising present.   Neurological:      Mental Status: She is alert.          Significant Labs:  All labs within the past 24 hours have been reviewed.     Significant Imaging:  Labs: Reviewed  ECG: Reviewed

## 2025-02-22 NOTE — ASSESSMENT & PLAN NOTE
Patient's FSGs are controlled on current medication regimen.  Last A1c reviewed-   Lab Results   Component Value Date    HGBA1C 5.9 (H) 01/14/2025     Most recent fingerstick glucose reviewed-   Recent Labs   Lab 02/21/25  1618 02/21/25  2149 02/22/25  0735 02/22/25  1136   POCTGLUCOSE 203* 140* 118* 166*       Current correctional scale  Medium  Maintain anti-hyperglycemic dose as follows-   Antihyperglycemics (From admission, onward)    Start     Stop Route Frequency Ordered    02/21/25 2100  insulin glargine U-100 (Lantus) pen 5 Units         -- SubQ Nightly 02/21/25 0645    02/21/25 0715  insulin aspart U-100 pen 5 Units         -- SubQ 3 times daily with meals 02/21/25 0645    02/15/25 1358  insulin aspart U-100 pen 0-10 Units         -- SubQ Before meals & nightly PRN 02/15/25 1258        Hold Oral hypoglycemics while patient is in the hospital.; cardiac consistent carbohydrate diet; monitor blood glucose log and titrate to effect  Prandial aspart added

## 2025-02-22 NOTE — ASSESSMENT & PLAN NOTE
Patient with known CAD s/p stent placement and CABG, which is controlled Will continue Statin; hold aspirin and Plavix setting of impending pacing and monitor for S/Sx of angina/ACS. Continue to monitor on telemetry.     -repeat ischemic evaluation of after PPM; defer timing to cardiology  -cardiology follow-up outpatient  -patient with chest pain and elevated troponin of 3 on 02/19   -S/p heart catheterization on 02/19, [facility] with patent stents and moderate LAD/RCA disease - no culprit identified

## 2025-02-22 NOTE — ASSESSMENT & PLAN NOTE
KYA is likely due to pre-renal azotemia due to intravascular volume depletion. Baseline creatinine is  1.6 to 2.2 . Most recent creatinine and eGFR are listed below.  Recent Labs     02/20/25  0429 02/21/25  0526 02/22/25  0447   CREATININE 4.1* 4.2* 3.0*   EGFRNORACEVR 11* 11* 16*        Plan  - KYA is stable  - Avoid nephrotoxins and renally dose meds for GFR listed above  - Monitor urine output, serial BMP, and adjust therapy as needed  - nephrology consulted: Initiated 1st session of dialysis on 02/21, plan for 2nd dialysis on 02/22  - monitor closely

## 2025-02-22 NOTE — ASSESSMENT & PLAN NOTE
-Hx of chest pain on presentation; different from previous anginal pains  -EKG showed complete heart block  -Discontinue all AV frances blocking agents  -Monitor patient on telemetry  -S/p [employer] PPM on 02/17  -Continue Keflex; arm sling per Cardiology  -Restart aspirin and Brilinta 02/18/25  -Patient with chest pain and tachycardia on 02/19. Troponin peaked at 3  -Discussed with cardiology, S/p heart catheterization on 02/19, [facility] with patent stents and moderate LAD/RCA disease - no culprit identified   -Change antibiotics to ceftriaxone to cover UTI, urine culture with Klebsiella oxytoca

## 2025-02-22 NOTE — PROGRESS NOTES
[facility]  Nephrology  Progress Note    Patient Name: Lorena Contreras  MRN: [id number]  Admission Date: 2/15/2025  Hospital Length of Stay: 7 days  Attending Provider: Singh Barbour DO   Primary Care Physician: Jorge Paris MD  Principal Problem:Complete heart block    Subjective:     HPI: 72 yo F with PMHx of DM2, Fibromyalgia, lymphoma s/p chemo, HTN, HLD, CVA, MI, CAD s/p PCIs CKD3b, HFpEF, and anemia presented to  ED via EMS c/o chest pain radiating to the back x 1 day. Vitals on arrival HR 45bpm. Labs on arrival  Cr 3.5, BNP 1115, EKG complet hear block. CXR normal. Pt given asprin and nitro by ems prior to arrival.     Nephrology consulted for KYA on CKD 3b    Prior records obtained and reviewed. Baseline Cr 2, last at baseline 1/14/25. Cr on arrival 3.5. Pt state she does not have a nephrologist. Pt states she was doing well prior to yesterday. She denies nsaid use, change in uop, n/v.     Interval History: still reports feeling ill today, plan for second session of HD today    Review of patient's allergies indicates:   Allergen Reactions    Novolin 70/30 (semi-synthetic) Nausea And Vomiting     Severe vomiting on Relion 70/30    Sulfa (sulfonamide antibiotics) Anaphylaxis    Talwin [pentazocine lactate] Anaphylaxis    Victoza [liraglutide] Nausea And Vomiting    Glipizide Nausea Only    Codeine     Influenza virus vaccines Hives    Iodine and iodide containing products Hives    Levetiracetam Itching    Lyrica [pregabalin] Hallucinations    Neurontin [gabapentin]      Possible associated myoclonic jerk    Rituxan [rituximab] Hives    Zoloft [sertraline] Nausea And Vomiting     Current Facility-Administered Medications   Medication Frequency    acetaminophen tablet 650 mg Q4H PRN    albuterol-ipratropium 2.5 mg-0.5 mg/3 mL nebulizer solution 3 mL Q4H PRN    aspirin EC tablet 81 mg Daily    atorvastatin tablet 80 mg QHS    cefTRIAXone injection 2 g Q24H    dextrose 50% injection 12.5 g  PRN    dextrose 50% injection 25 g PRN    FLUoxetine capsule 40 mg Daily    furosemide tablet 40 mg BID    glucagon (human recombinant) injection 1 mg PRN    glucose chewable tablet 16 g PRN    glucose chewable tablet 24 g PRN    hydrALAZINE injection 10 mg Q4H PRN    hydrALAZINE tablet 100 mg Q8H    HYDROcodone-acetaminophen 5-325 mg per tablet 1 tablet Q4H PRN    HYDROmorphone (PF) injection 1 mg Q6H PRN    hydrOXYzine pamoate capsule 50 mg Q8H PRN    insulin aspart U-100 pen 0-10 Units QID (AC + HS) PRN    insulin aspart U-100 pen 5 Units TIDWM    insulin glargine U-100 (Lantus) pen 5 Units QHS    isosorbide mononitrate 24 hr tablet 60 mg Daily    LORazepam tablet 1 mg Daily PRN    melatonin tablet 6 mg Nightly PRN    metoprolol tartrate (LOPRESSOR) tablet 25 mg BID    nitroGLYCERIN SL tablet 0.4 mg Q5 Min PRN    ondansetron disintegrating tablet 8 mg Q8H PRN    ondansetron injection 4 mg Q4H PRN    oxyCODONE immediate release tablet 10 mg Q4H PRN    pantoprazole EC tablet 40 mg Daily    pneumoc 20-michael conj-dip cr(PF) (PREVNAR-20 (PF)) injection Syrg 0.5 mL vaccine x 1 dose    promethazine (PHENERGAN) 6.25 mg in 0.9% NaCl 50 mL IVPB Q6H PRN    simethicone chewable tablet 80 mg TID PRN    sodium chloride 0.9% flush 10 mL PRN    sodium chloride 0.9% flush 10 mL PRN    ticagrelor tablet 60 mg BID       Objective:     Vital Signs (Most Recent):  Temp: 98.1 °F (36.7 °C) (02/22/25 0733)  Pulse: 77 (02/22/25 1122)  Resp: 18 (02/22/25 0927)  BP: (!) 113/48 (02/22/25 0733)  SpO2: 97 % (02/22/25 0733) Vital Signs (24h Range):  Temp:  [97.4 °F (36.3 °C)-98.3 °F (36.8 °C)] 98.1 °F (36.7 °C)  Pulse:  [61-83] 77  Resp:  [15-21] 18  SpO2:  [97 %-100 %] 97 %  BP: (113-156)/(48-79) 113/48     Weight: 85.9 kg (189 lb 6 oz) (02/17/25 1616)  Body mass index is 27.97 kg/m².  Body surface area is 2.04 meters squared.    I/O last 3 completed shifts:  In: 0   Out: 1200 [Urine:200; Other:1000]     Physical Exam  Constitutional:        General: She is not in acute distress.     Appearance: She is not ill-appearing or toxic-appearing.   HENT:      Nose: Nose normal. No congestion.      Mouth/Throat:      Mouth: Mucous membranes are moist.   Eyes:      Pupils: Pupils are equal, round, and reactive to light.   Cardiovascular:      Rate and Rhythm: Tachycardia present.      Heart sounds: Murmur heard.      Comments: TDC right chest wall  Abdominal:      Tenderness: There is abdominal tenderness.   Musculoskeletal:      Right lower leg: Edema present.      Left lower leg: Edema present.   Skin:     Findings: Bruising present.   Neurological:      Mental Status: She is alert.          Significant Labs:  All labs within the past 24 hours have been reviewed.     Significant Imaging:  Labs: Reviewed  ECG: Reviewed  Assessment/Plan:     Renal/  Acute kidney injury superimposed on stage 3b chronic kidney disease  Baseline creatinine 2.0  On presentation creatinine 3.5    KYA due to cardiogenic shock secondary to complete heart block and initially improved. However developed NSTEMI and under went left heart cath 2/19  HD initiated 2/22    Plan/Recommendation  -HD session 2 today  -obtain HD chair labs and have / place referrals for HD, appreciate assistance  -Keep MAP > 65  -Keep hemoglobin > 7  -Strict ins and outs  -Avoid nephrotoxic agents if possible and renally dose medications  -Avoid drastic hemodynamic changes if possible    Hyponatremia   Now on HD    Metabolic acidosis   Now on hD        Thank you for your consult. I will follow-up with patient. Please contact us if you have any additional questions.    Alex Castañeda MD  Nephrology  [facility] - Med Surg

## 2025-02-22 NOTE — PROGRESS NOTES
[facility] Medicine  Progress Note    Patient Name: Lorena Contreras  MRN: [id number]  Patient Class: IP- Inpatient   Admission Date: 2/15/2025  Length of Stay: 7 days  Attending Physician: Singh Barbour DO  Primary Care Provider: Jorge Paris MD        Subjective     Principal Problem:Complete heart block        HPI:  A pleasant  72 yo F with PMHx of  DM2, Fibromyalgia, lymphoma s/p chemo, HTN, HLD, CVA, MI, CAD s/p PCIs  CKD3b, HFpEF, and anemia who presented to the ED with a chief complaint of chest pain radiating to the back with onset a day before presentation.    Pain was said to be sudden in onset; no associated nausea or diaphoresis.  No Preceding PND orthopnea or leg swelling; pain was described as different from her previous coronary events.  Patient however endorsed dizziness but this has been going on for some time.  She denied any syncopal event  Symptoms progress and this prompted patient to come to the ED    Retrospective chart review indicates multiple ED visits for observation for atypical chest pain.  Cardiac history is significant for CAD with JESIKA x 2 to RCA 3/29/19 - JESIKA to RI and Cx 1/8/20      On presentation to the ED; patient was bradycardic to 32 BPM which subsequently improved and mildly hypertensive.  EKG showed complete heart block.  Patient was admitted to the ICU for further management.    Overview/Hospital Course:  73-year-old female with history of diabetes, anxiety,HTN, HLD, CVA, MI, CAD s/p PCIs  CKD3b, HFpEF, and anemia admitted to ICU on 02/15/2025 for complete heart block and KYA.  Cardiology consulted- patient s/p ppm on 02/17.  Patient to start aspirin and Brilinta on 02/18.   Patient with chest pain and tachycardia overnight on 02/18. Found to have elevated troponin 1.9 and peaked at 3.3.  EKG noted and reviewed with NO STEMI and unchanged from previous on 2/17. Device interrogated - normal function. No PMT Discussed with cardiology. S/p heart  catheterization on 02/19, [facility] with patent stents and moderate LAD/RCA disease - no culprit identified . Continue medical Rx per cardiology. Nephrology consulted for KYA on CKD. Pt with severe nausea and vomiting, BUN >100. THDC placed on 02/21, and 1st HD session started on 02/21.  Plan for 2nd session on 02/22    Interval History:  No acute overnight events.  Patient remained afebrile.  Tolerated dialysis yesterday.  States she still feels unwell.  Has some bleeding around the TH DC site.  Discussed with IR, recommend to apply pressure.  Nephrology plan to take patient for 2nd dialysis session today    Review of Systems   Constitutional:  Negative for fatigue and fever.   Respiratory:  Negative for cough, chest tightness and shortness of breath.    Cardiovascular:  Negative for chest pain and palpitations.   Gastrointestinal:  Positive for nausea. Negative for abdominal pain and vomiting.   Genitourinary:  Negative for difficulty urinating, dysuria, frequency and urgency.   Musculoskeletal:  Negative for neck pain.   Neurological:  Negative for dizziness and headaches.   Psychiatric/Behavioral:  Negative for confusion. The patient is nervous/anxious.      Objective:     Vital Signs (Most Recent):  Temp: 97.6 °F (36.4 °C) (02/22/25 0951)  Pulse: 74 (02/22/25 1255)  Resp: 18 (02/22/25 0951)  BP: (!) 125/50 (02/22/25 1255)  SpO2: 97 % (02/22/25 0733) Vital Signs (24h Range):  Temp:  [97.4 °F (36.3 °C)-98.3 °F (36.8 °C)] 97.6 °F (36.4 °C)  Pulse:  [61-85] 74  Resp:  [16-21] 18  SpO2:  [97 %-99 %] 97 %  BP: (113-156)/(40-79) 125/50     Weight: 85.9 kg (189 lb 6 oz)  Body mass index is 27.97 kg/m².    Intake/Output Summary (Last 24 hours) at 2/22/2025 1446  Last data filed at 2/22/2025 0919  Gross per 24 hour   Intake 240 ml   Output 1001 ml   Net -761 ml         Physical Exam  Vitals and nursing note reviewed.   Constitutional:       General: She is not in acute distress.     Appearance: She is ill-appearing.   HENT:       Mouth/Throat:      Mouth: Mucous membranes are moist.   Cardiovascular:      Rate and Rhythm: Normal rate and regular rhythm.      Pulses: Normal pulses.   Pulmonary:      Effort: Pulmonary effort is normal. No respiratory distress.      Breath sounds: Normal breath sounds. No wheezing or rales.      Comments: On room air.  Clean surgical dressing on left anterior chest wall  Abdominal:      General: There is no distension.      Palpations: Abdomen is soft.      Tenderness: There is abdominal tenderness.   Musculoskeletal:      Cervical back: No tenderness.      Right lower leg: No edema.      Left lower leg: No edema.      Comments: TH DC on right upper chest wall.  Dressing with blood.  Nontender on exam   Neurological:      General: No focal deficit present.      Mental Status: She is alert and oriented to person, place, and time.   Psychiatric:         Mood and Affect: Mood is anxious.      Comments: Patient anxious on exam,             Significant Labs: All pertinent labs within the past 24 hours have been reviewed.    Significant Imaging: I have reviewed all pertinent imaging results/findings within the past 24 hours.    Assessment and Plan     * Complete heart block  -Hx of chest pain on presentation; different from previous anginal pains  -EKG showed complete heart block  -Discontinue all AV frances blocking agents  -Monitor patient on telemetry  -S/p Medtronic PPM on 02/17  -Continue Keflex; arm sling per Cardiology  -Restart aspirin and Brilinta 02/18/25  -Patient with chest pain and tachycardia on 02/19. Troponin peaked at 3  -Discussed with cardiology, S/p heart catheterization on 02/19, [facility] with patent stents and moderate LAD/RCA disease - no culprit identified   -Change antibiotics to ceftriaxone to cover UTI, urine culture with Klebsiella oxytoca      Cardiac pacemaker in situ  -s/p ppm on 02/17.    Acute kidney injury superimposed on stage 3b chronic kidney disease  KYA is likely due to pre-renal  azotemia due to intravascular volume depletion. Baseline creatinine is  1.6 to 2.2 . Most recent creatinine and eGFR are listed below.  Recent Labs     02/20/25  0429 02/21/25  0526 02/22/25  0447   CREATININE 4.1* 4.2* 3.0*   EGFRNORACEVR 11* 11* 16*        Plan  - KYA is stable  - Avoid nephrotoxins and renally dose meds for GFR listed above  - Monitor urine output, serial BMP, and adjust therapy as needed  - nephrology consulted: Initiated 1st session of dialysis on 02/21, plan for 2nd dialysis on 02/22  - monitor closely     Coronary artery disease of native artery of native heart with stable angina pectoris  Patient with known CAD s/p stent placement and CABG, which is controlled Will continue Statin; hold aspirin and Plavix setting of impending pacing and monitor for S/Sx of angina/ACS. Continue to monitor on telemetry.     -repeat ischemic evaluation of after PPM; defer timing to cardiology  -cardiology follow-up outpatient  -patient with chest pain and elevated troponin of 3 on 02/19   -S/p heart catheterization on 02/19, [facility] with patent stents and moderate LAD/RCA disease - no culprit identified     Aortic stenosis  Echocardiogram with evidence of aortic stenosis that is moderate . The patient's most recent echocardiogram result is listed below. We will manage the valvular abnormality by avoiding volume overload; and cardiology follow up as an outpatient    Echo Saline Bubble? No  Result Date: 2/19/2025    Left Ventricle: The left ventricle is normal in size. There is mild   concentric hypertrophy. Septal motion is consistent with pacing. There is   normal systolic function with a visually estimated ejection fraction of 60   - 65%. Grade II diastolic dysfunction.    Right Ventricle: Mild right ventricular enlargement. Systolic function   is normal.    Left Atrium: Left atrium is moderately dilated.    Right Atrium: Right atrium is mildly dilated.    Aortic Valve: There is moderate stenosis. Aortic valve  area by VTI is   1.0 cm². Aortic valve peak velocity is 3.2 m/s. Mean gradient is 24 mmHg.   The dimensionless index is 0.33.    Mitral Valve: There is mild stenosis. The mean pressure gradient across   the mitral valve is 12 mmHg at a heart rate of  bpm. There is mild   regurgitation.    Tricuspid Valve: There is moderate regurgitation.    Pulmonary Artery: The estimated pulmonary artery systolic pressure is   72 mmHg.    IVC/SVC: Intermediate venous pressure at 8 mmHg.    Pericardium: There is a trivial posterior effusion.        Echo  Result Date: 2/15/2025    Left Ventricle: The left ventricle is normal in size. There is moderate   concentric hypertrophy. There is hyperdynamic systolic function with a   visually estimated ejection fraction of 70 - 75%.    Right Ventricle: Normal right ventricular cavity size. Systolic   function is normal.    Left Atrium: Left atrium is moderately dilated.    Right Atrium: Right atrium is mildly dilated.    Aortic Valve: There is moderate stenosis. Aortic valve area by VTI is   0.8 cm². Aortic valve peak velocity is 3.4 m/s. Mean gradient is 27 mmHg.   The dimensionless index is 0.27.    Mitral Valve: There is mild stenosis. The mean pressure gradient across   the mitral valve is 5 mmHg at a heart rate of 42  bpm. There is mild   regurgitation.    Tricuspid Valve: There is moderate regurgitation.    Pulmonary Artery: The estimated pulmonary artery systolic pressure is   49 mmHg.    IVC/SVC: Intermediate venous pressure at 8 mmHg.        Echo  Result Date: 10/31/2024    Left Ventricle: The left ventricle is normal in size. Normal wall   thickness. Normal wall motion. There is normal systolic function with a   visually estimated ejection fraction of 60 - 65%. Grade II diastolic   dysfunction. Elevated left ventricular filling pressure.    Right Ventricle: Normal right ventricular cavity size. Wall thickness   is normal. Systolic function is normal.    Left Atrium: Left atrium is  severely dilated.    Aortic Valve: There is moderate aortic valve sclerosis. Moderately   restricted motion. There is moderate stenosis. Aortic valve area by VTI is   1.1 cm2. Aortic valve peak velocity is 3.1 m/s. Mean gradient is 20.1   mmHg. The dimensionless index is 0.37.    Mitral Valve: There is mild regurgitation.    Tricuspid Valve: There is mild regurgitation.    Pulmonary Artery: There is pulmonary hypertension. The estimated   pulmonary artery systolic pressure is 46 mmHg.    IVC/SVC: Normal venous pressure at 3 mmHg.           Mixed hyperlipidemia  Continue home statin      Diabetes mellitus  Patient's FSGs are controlled on current medication regimen.  Last A1c reviewed-   Lab Results   Component Value Date    HGBA1C 5.9 (H) 01/14/2025     Most recent fingerstick glucose reviewed-   Recent Labs   Lab 02/21/25  1618 02/21/25  2149 02/22/25  0735 02/22/25  1136   POCTGLUCOSE 203* 140* 118* 166*       Current correctional scale  Medium  Maintain anti-hyperglycemic dose as follows-   Antihyperglycemics (From admission, onward)      Start     Stop Route Frequency Ordered    02/21/25 2100  insulin glargine U-100 (Lantus) pen 5 Units         -- SubQ Nightly 02/21/25 0645    02/21/25 0715  insulin aspart U-100 pen 5 Units         -- SubQ 3 times daily with meals 02/21/25 0645    02/15/25 1358  insulin aspart U-100 pen 0-10 Units         -- SubQ Before meals & nightly PRN 02/15/25 1258          Hold Oral hypoglycemics while patient is in the hospital.; cardiac consistent carbohydrate diet; monitor blood glucose log and titrate to effect  Prandial aspart added    Hyponatremia  Hyponatremia is likely due to renal insufficiency. The patient's most recent sodium results are listed below.  Recent Labs     02/20/25  0429 02/21/25  0526 02/22/25  0447   * 132* 134*       Plan  - nephrology consulted  - Monitor sodium Daily.   - Patient hyponatremia is stable  - monitor closely    Anxiety  -hydroxyzine p.r.n.  every 8 hours for anxiety      Stage 3b chronic kidney disease  Creatine stable for now. BMP reviewed- noted Estimated Creatinine Clearance: 15.2 mL/min (A) (based on SCr of 3.8 mg/dL (H)). according to latest data. Based on current GFR, CKD stage is stage 4 - GFR 15-29.  Monitor UOP and serial BMP and adjust therapy as needed. Renally dose meds. Avoid nephrotoxic medications and procedures.      VTE Risk Mitigation (From admission, onward)           Ordered     IP VTE HIGH RISK PATIENT  Once         02/15/25 1252     Place sequential compression device  Until discontinued         02/15/25 1252     Place sequential compression device  Until discontinued         02/15/25 0802                    Discharge Planning   MIKHAIL: 2/24/2025     Code Status: Full Code   Medical Readiness for Discharge Date:   Discharge Plan A: Home                Please place Justification for CONSTANTINO Barbour DO  Department of Hospital Medicine   [facility] [place] [facility]

## 2025-02-23 LAB
ANION GAP SERPL CALC-SCNC: 11 MMOL/L (ref 8–16)
BUN SERPL-MCNC: 40 MG/DL (ref 8–23)
CALCIUM SERPL-MCNC: 8.2 MG/DL (ref 8.7–10.5)
CHLORIDE SERPL-SCNC: 103 MMOL/L (ref 95–110)
CO2 SERPL-SCNC: 23 MMOL/L (ref 23–29)
CREAT SERPL-MCNC: 2.1 MG/DL (ref 0.5–1.4)
EST. GFR  (NO RACE VARIABLE): 24 ML/MIN/1.73 M^2
GLUCOSE SERPL-MCNC: 96 MG/DL (ref 70–110)
POCT GLUCOSE: 106 MG/DL (ref 70–110)
POCT GLUCOSE: 114 MG/DL (ref 70–110)
POCT GLUCOSE: 118 MG/DL (ref 70–110)
POCT GLUCOSE: 99 MG/DL (ref 70–110)
POTASSIUM SERPL-SCNC: 3.5 MMOL/L (ref 3.5–5.1)
SODIUM SERPL-SCNC: 137 MMOL/L (ref 136–145)

## 2025-02-23 PROCEDURE — 25000003 PHARM REV CODE 250: Mod: HCNC | Performed by: STUDENT IN AN ORGANIZED HEALTH CARE EDUCATION/TRAINING PROGRAM

## 2025-02-23 PROCEDURE — 36415 COLL VENOUS BLD VENIPUNCTURE: CPT | Mod: HCNC | Performed by: STUDENT IN AN ORGANIZED HEALTH CARE EDUCATION/TRAINING PROGRAM

## 2025-02-23 PROCEDURE — 63600175 PHARM REV CODE 636 W HCPCS: Mod: HCNC | Performed by: STUDENT IN AN ORGANIZED HEALTH CARE EDUCATION/TRAINING PROGRAM

## 2025-02-23 PROCEDURE — C1752 CATH,HEMODIALYSIS,SHORT-TERM: HCPCS | Mod: HCNC

## 2025-02-23 PROCEDURE — 25000003 PHARM REV CODE 250: Mod: HCNC | Performed by: EMERGENCY MEDICINE

## 2025-02-23 PROCEDURE — 25000003 PHARM REV CODE 250: Mod: HCNC | Performed by: INTERNAL MEDICINE

## 2025-02-23 PROCEDURE — 99232 SBSQ HOSP IP/OBS MODERATE 35: CPT | Mod: HCNC,,, | Performed by: STUDENT IN AN ORGANIZED HEALTH CARE EDUCATION/TRAINING PROGRAM

## 2025-02-23 PROCEDURE — 80048 BASIC METABOLIC PNL TOTAL CA: CPT | Mod: HCNC | Performed by: STUDENT IN AN ORGANIZED HEALTH CARE EDUCATION/TRAINING PROGRAM

## 2025-02-23 PROCEDURE — 25000003 PHARM REV CODE 250: Mod: HCNC | Performed by: HOSPITALIST

## 2025-02-23 PROCEDURE — 25000242 PHARM REV CODE 250 ALT 637 W/ HCPCS: Mod: HCNC | Performed by: INTERNAL MEDICINE

## 2025-02-23 PROCEDURE — 21400001 HC TELEMETRY ROOM: Mod: HCNC

## 2025-02-23 PROCEDURE — 63600175 PHARM REV CODE 636 W HCPCS: Mod: JZ,TB,HCNC | Performed by: HOSPITALIST

## 2025-02-23 RX ORDER — SODIUM CHLORIDE 9 MG/ML
INJECTION, SOLUTION INTRAVENOUS ONCE
Status: CANCELLED | OUTPATIENT
Start: 2025-02-23 | End: 2025-02-23

## 2025-02-23 RX ORDER — CEPHALEXIN 500 MG/1
500 CAPSULE ORAL EVERY 8 HOURS
Status: DISCONTINUED | OUTPATIENT
Start: 2025-02-24 | End: 2025-02-25 | Stop reason: HOSPADM

## 2025-02-23 RX ADMIN — ATORVASTATIN CALCIUM 80 MG: 40 TABLET, FILM COATED ORAL at 09:02

## 2025-02-23 RX ADMIN — TICAGRELOR 60 MG: 60 TABLET ORAL at 12:02

## 2025-02-23 RX ADMIN — HYDRALAZINE HYDROCHLORIDE 100 MG: 25 TABLET ORAL at 09:02

## 2025-02-23 RX ADMIN — ASPIRIN 81 MG: 81 TABLET, COATED ORAL at 12:02

## 2025-02-23 RX ADMIN — ISOSORBIDE MONONITRATE 60 MG: 30 TABLET, EXTENDED RELEASE ORAL at 12:02

## 2025-02-23 RX ADMIN — HYDRALAZINE HYDROCHLORIDE 100 MG: 25 TABLET ORAL at 06:02

## 2025-02-23 RX ADMIN — CEFTRIAXONE 2 G: 2 INJECTION, POWDER, FOR SOLUTION INTRAMUSCULAR; INTRAVENOUS at 03:02

## 2025-02-23 RX ADMIN — PANTOPRAZOLE SODIUM 40 MG: 40 TABLET, DELAYED RELEASE ORAL at 12:02

## 2025-02-23 RX ADMIN — HYDROXYZINE PAMOATE 50 MG: 25 CAPSULE ORAL at 06:02

## 2025-02-23 RX ADMIN — OXYCODONE 10 MG: 5 TABLET ORAL at 12:02

## 2025-02-23 RX ADMIN — METOPROLOL TARTRATE 25 MG: 25 TABLET, FILM COATED ORAL at 09:02

## 2025-02-23 RX ADMIN — LACTOBACILLUS ACIDOPHILUS / LACTOBACILLUS BULGARICUS 1 EACH: 100 MILLION CFU STRENGTH GRANULES at 09:02

## 2025-02-23 RX ADMIN — METOPROLOL TARTRATE 25 MG: 25 TABLET, FILM COATED ORAL at 12:02

## 2025-02-23 RX ADMIN — TICAGRELOR 60 MG: 60 TABLET ORAL at 09:02

## 2025-02-23 RX ADMIN — HYDROMORPHONE HYDROCHLORIDE 1 MG: 2 INJECTION, SOLUTION INTRAMUSCULAR; INTRAVENOUS; SUBCUTANEOUS at 05:02

## 2025-02-23 RX ADMIN — LACTOBACILLUS ACIDOPHILUS / LACTOBACILLUS BULGARICUS 1 EACH: 100 MILLION CFU STRENGTH GRANULES at 12:02

## 2025-02-23 RX ADMIN — HYDROMORPHONE HYDROCHLORIDE 1 MG: 2 INJECTION, SOLUTION INTRAMUSCULAR; INTRAVENOUS; SUBCUTANEOUS at 06:02

## 2025-02-23 RX ADMIN — FLUOXETINE HYDROCHLORIDE 40 MG: 20 CAPSULE ORAL at 12:02

## 2025-02-23 NOTE — SUBJECTIVE & OBJECTIVE
Interval History: sleeping this AM, when woken up states that she is still feeling ill however quickly fell back asleep    Review of patient's allergies indicates:   Allergen Reactions    Novolin 70/30 (semi-synthetic) Nausea And Vomiting     Severe vomiting on Relion 70/30    Sulfa (sulfonamide antibiotics) Anaphylaxis    Talwin [pentazocine lactate] Anaphylaxis    Victoza [liraglutide] Nausea And Vomiting    Glipizide Nausea Only    Codeine     Influenza virus vaccines Hives    Iodine and iodide containing products Hives    Levetiracetam Itching    Lyrica [pregabalin] Hallucinations    Neurontin [gabapentin]      Possible associated myoclonic jerk    Rituxan [rituximab] Hives    Zoloft [sertraline] Nausea And Vomiting     Current Facility-Administered Medications   Medication Frequency    acetaminophen tablet 650 mg Q4H PRN    albuterol-ipratropium 2.5 mg-0.5 mg/3 mL nebulizer solution 3 mL Q4H PRN    aspirin EC tablet 81 mg Daily    atorvastatin tablet 80 mg QHS    cefTRIAXone injection 2 g Q24H    dextrose 50% injection 12.5 g PRN    dextrose 50% injection 25 g PRN    FLUoxetine capsule 40 mg Daily    glucagon (human recombinant) injection 1 mg PRN    glucose chewable tablet 16 g PRN    glucose chewable tablet 24 g PRN    hydrALAZINE injection 10 mg Q4H PRN    hydrALAZINE tablet 100 mg Q8H    HYDROcodone-acetaminophen 5-325 mg per tablet 1 tablet Q4H PRN    HYDROmorphone (PF) injection 1 mg Q6H PRN    hydrOXYzine pamoate capsule 50 mg Q8H PRN    insulin aspart U-100 pen 0-10 Units QID (AC + HS) PRN    isosorbide mononitrate 24 hr tablet 60 mg Daily    lactobacillus acidophilus & bulgar 100 million cell packet 1 each BID    LORazepam tablet 1 mg Daily PRN    melatonin tablet 6 mg Nightly PRN    metoprolol tartrate (LOPRESSOR) tablet 25 mg BID    nitroGLYCERIN SL tablet 0.4 mg Q5 Min PRN    ondansetron disintegrating tablet 8 mg Q8H PRN    ondansetron injection 4 mg Q4H PRN    oxyCODONE immediate release tablet 10  mg Q4H PRN    pantoprazole EC tablet 40 mg Daily    pneumoc 20-michael conj-dip cr(PF) (PREVNAR-20 (PF)) injection Syrg 0.5 mL vaccine x 1 dose    promethazine (PHENERGAN) 6.25 mg in 0.9% NaCl 50 mL IVPB Q6H PRN    simethicone chewable tablet 80 mg TID PRN    sodium chloride 0.9% flush 10 mL PRN    sodium chloride 0.9% flush 10 mL PRN    ticagrelor tablet 60 mg BID       Objective:     Vital Signs (Most Recent):  Temp: 97.7 °F (36.5 °C) (02/23/25 1141)  Pulse: 74 (02/23/25 1158)  Resp: 18 (02/23/25 1227)  BP: 127/61 (02/23/25 1141)  SpO2: 96 % (02/23/25 1141) Vital Signs (24h Range):  Temp:  [97.7 °F (36.5 °C)-98.4 °F (36.9 °C)] 97.7 °F (36.5 °C)  Pulse:  [72-79] 74  Resp:  [18] 18  SpO2:  [94 %-100 %] 96 %  BP: (104-132)/(50-70) 127/61     Weight: 85.9 kg (189 lb 6 oz) (02/17/25 1616)  Body mass index is 27.97 kg/m².  Body surface area is 2.04 meters squared.    I/O last 3 completed shifts:  In: 480 [P.O.:480]  Out: 2201 [Urine:201; Other:2000]     Physical Exam  Constitutional:       General: She is not in acute distress.     Appearance: She is not ill-appearing or toxic-appearing.   HENT:      Nose: Nose normal. No congestion.      Mouth/Throat:      Mouth: Mucous membranes are moist.   Eyes:      Pupils: Pupils are equal, round, and reactive to light.   Cardiovascular:      Rate and Rhythm: Tachycardia present.      Heart sounds: Murmur heard.      Comments: TDC right chest wall  Abdominal:      Tenderness: There is no abdominal tenderness.   Musculoskeletal:      Right lower leg: Edema present.      Left lower leg: Edema present.   Skin:     Findings: Bruising present.   Neurological:      Mental Status: She is alert.          Significant Labs:  All labs within the past 24 hours have been reviewed.     Significant Imaging:  Labs: Reviewed

## 2025-02-23 NOTE — ASSESSMENT & PLAN NOTE
KYA is likely due to pre-renal azotemia due to intravascular volume depletion. Baseline creatinine is  1.6 to 2.2 . Most recent creatinine and eGFR are listed below.  Recent Labs     02/21/25  0526 02/22/25  0447 02/23/25  0507   CREATININE 4.2* 3.0* 2.1*   EGFRNORACEVR 11* 16* 24*        Plan  - KYA is stable  - Avoid nephrotoxins and renally dose meds for GFR listed above  - Monitor urine output, serial BMP, and adjust therapy as needed  - nephrology consulted: Initiated 1st session of dialysis on 02/21, completed 2nd dialysis on 02/22  -plan for 3rd session tomorrow on 02/24  - monitor closely

## 2025-02-23 NOTE — NURSING
[facility]  Nurses Note -- 4 Eyes      2/22/2025       Skin assessed on: Q Shift      [x] No Pressure Injuries Present    [x]Prevention Measures Documented    With redness noted in the sacrum    [] Yes LDA  for Pressure Injury Previously documented     [] Yes New Pressure Injury Discovered   [] LDA for New Pressure Injury Added      Attending RN:  Suri Chadwick, RN     Second RN:  Mary KearneyRN

## 2025-02-23 NOTE — ASSESSMENT & PLAN NOTE
-Hx of chest pain on presentation; different from previous anginal pains  -EKG showed complete heart block  -Discontinue all AV frances blocking agents  -Monitor patient on telemetry  -S/p Medtronic PPM on 02/17  -Continue Keflex; arm sling per Cardiology  -Restart aspirin and Brilinta 02/18/25  -Patient with chest pain and tachycardia on 02/19. Troponin peaked at 3  -Discussed with cardiology, S/p heart catheterization on 02/19, [facility] with patent stents and moderate LAD/RCA disease - no culprit identified   -Change antibiotics to ceftriaxone to cover UTI, urine culture with Klebsiella oxytoca  -completed treatment for UTI on 02/23/2025  -resume Keflex for prophylaxis antibiotics per Cardiology

## 2025-02-23 NOTE — PROGRESS NOTES
[facility]  Progress Note    Patient Name: Lorena Contreras  MRN: [id number]  Patient Class: IP- Inpatient   Admission Date: 2/15/2025  Length of Stay: 8 days  Attending Physician: Singh Barbour DO  Primary Care Provider: Jorge Paris MD        Subjective     Principal Problem:Complete heart block        HPI:  A pleasant  72 yo F with PMHx of  DM2, Fibromyalgia, lymphoma s/p chemo, HTN, HLD, CVA, MI, CAD s/p PCIs  CKD3b, HFpEF, and anemia who presented to the ED with a chief complaint of chest pain radiating to the back with onset a day before presentation.    Pain was said to be sudden in onset; no associated nausea or diaphoresis.  No Preceding PND orthopnea or leg swelling; pain was described as different from her previous coronary events.  Patient however endorsed dizziness but this has been going on for some time.  She denied any syncopal event  Symptoms progress and this prompted patient to come to the ED    Retrospective chart review indicates multiple ED visits for observation for atypical chest pain.  Cardiac history is significant for CAD with JESIKA x 2 to RCA 3/29/19 - JESIKA to RI and Cx 1/8/20      On presentation to the ED; patient was bradycardic to 32 BPM which subsequently improved and mildly hypertensive.  EKG showed complete heart block.  Patient was admitted to the ICU for further management.    Overview/Hospital Course:  73-year-old female with history of diabetes, anxiety,HTN, HLD, CVA, MI, CAD s/p PCIs  CKD3b, HFpEF, and anemia admitted to ICU on 02/15/2025 for complete heart block and KYA.  Cardiology consulted- patient s/p ppm on 02/17.  Patient to start aspirin and Brilinta on 02/18.   Patient with chest pain and tachycardia overnight on 02/18. Found to have elevated troponin 1.9 and peaked at 3.3.  EKG noted and reviewed with NO STEMI and unchanged from previous on 2/17. Device interrogated - normal function. No PMT Discussed with cardiology. S/p heart  catheterization on 02/19, [facility] with patent stents and moderate LAD/RCA disease - no culprit identified . Continue medical Rx per cardiology. Nephrology consulted for KYA on CKD. Pt with severe nausea and vomiting, BUN >100. THDC placed on 02/21, and 1st HD session started on 02/21, 2nd session on 02/22 and plan for 3rd session on 02/24.     Interval History:  No acute overnight events.  Daughter at bedside.  Tolerated dialysis yesterday.  Still saying she does not feel good overall.  Crying over the bruises on her arms.  Was initially sleeping, easily awoken, but then fall back asleep.  Has been tolerating diet, but complains of nausea.    Review of Systems   Constitutional:  Negative for fatigue and fever.   Respiratory:  Negative for cough, chest tightness and shortness of breath.    Cardiovascular:  Negative for chest pain and palpitations.   Gastrointestinal:  Positive for nausea. Negative for abdominal pain and vomiting.   Genitourinary:  Negative for difficulty urinating, dysuria, frequency and urgency.   Musculoskeletal:  Negative for neck pain.   Neurological:  Negative for dizziness and headaches.   Psychiatric/Behavioral:  Negative for confusion. The patient is nervous/anxious.      Objective:     Vital Signs (Most Recent):  Temp: 97.7 °F (36.5 °C) (02/23/25 1141)  Pulse: 74 (02/23/25 1158)  Resp: 18 (02/23/25 1227)  BP: 127/61 (02/23/25 1141)  SpO2: 96 % (02/23/25 1141) Vital Signs (24h Range):  Temp:  [97.7 °F (36.5 °C)-98.4 °F (36.9 °C)] 97.7 °F (36.5 °C)  Pulse:  [72-79] 74  Resp:  [18] 18  SpO2:  [94 %-100 %] 96 %  BP: (104-132)/(50-70) 127/61     Weight: 85.9 kg (189 lb 6 oz)  Body mass index is 27.97 kg/m².    Intake/Output Summary (Last 24 hours) at 2/23/2025 1246  Last data filed at 2/23/2025 0450  Gross per 24 hour   Intake 240 ml   Output 1200 ml   Net -960 ml         Physical Exam  Vitals and nursing note reviewed.   Constitutional:       General: She is not in acute distress.     Appearance:  She is ill-appearing.   HENT:      Mouth/Throat:      Mouth: Mucous membranes are moist.   Cardiovascular:      Rate and Rhythm: Normal rate and regular rhythm.      Pulses: Normal pulses.   Pulmonary:      Effort: Pulmonary effort is normal. No respiratory distress.      Breath sounds: Normal breath sounds. No wheezing or rales.      Comments: On room air.  Clean surgical dressing on left anterior chest wall  Abdominal:      General: There is no distension.      Palpations: Abdomen is soft.      Tenderness: There is no abdominal tenderness.   Musculoskeletal:      Cervical back: No tenderness.      Right lower leg: No edema.      Left lower leg: No edema.      Comments: TH DC on right upper chest wall.  The bleeding has improved.  Nontender on exam   Neurological:      General: No focal deficit present.      Mental Status: She is alert and oriented to person, place, and time.   Psychiatric:         Mood and Affect: Mood is anxious.      Comments: Patient anxious on exam, but easily falls back asleep             Significant Labs: All pertinent labs within the past 24 hours have been reviewed.    Significant Imaging: I have reviewed all pertinent imaging results/findings within the past 24 hours.    Assessment and Plan     * Complete heart block  -Hx of chest pain on presentation; different from previous anginal pains  -EKG showed complete heart block  -Discontinue all AV frances blocking agents  -Monitor patient on telemetry  -S/p [employer] PPM on 02/17  -Continue Keflex; arm sling per Cardiology  -Restart aspirin and Brilinta 02/18/25  -Patient with chest pain and tachycardia on 02/19. Troponin peaked at 3  -Discussed with cardiology, S/p heart catheterization on 02/19, [facility] with patent stents and moderate LAD/RCA disease - no culprit identified   -Change antibiotics to ceftriaxone to cover UTI, urine culture with Klebsiella oxytoca  -completed treatment for UTI on 02/23/2025  -resume Keflex for prophylaxis  antibiotics per Cardiology      Cardiac pacemaker in situ  -s/p ppm on 02/17.  -resume Keflex on 02/24    Acute kidney injury superimposed on stage 3b chronic kidney disease  KYA is likely due to pre-renal azotemia due to intravascular volume depletion. Baseline creatinine is  1.6 to 2.2 . Most recent creatinine and eGFR are listed below.  Recent Labs     02/21/25  0526 02/22/25  0447 02/23/25  0507   CREATININE 4.2* 3.0* 2.1*   EGFRNORACEVR 11* 16* 24*        Plan  - KYA is stable  - Avoid nephrotoxins and renally dose meds for GFR listed above  - Monitor urine output, serial BMP, and adjust therapy as needed  - nephrology consulted: Initiated 1st session of dialysis on 02/21, completed 2nd dialysis on 02/22  -plan for 3rd session tomorrow on 02/24  - monitor closely     Coronary artery disease of native artery of native heart with stable angina pectoris  Patient with known CAD s/p stent placement and CABG, which is controlled Will continue Statin; hold aspirin and Plavix setting of impending pacing and monitor for S/Sx of angina/ACS. Continue to monitor on telemetry.     -repeat ischemic evaluation of after PPM; defer timing to cardiology  -cardiology follow-up outpatient  -patient with chest pain and elevated troponin of 3 on 02/19   -S/p heart catheterization on 02/19, [facility] with patent stents and moderate LAD/RCA disease - no culprit identified     Aortic stenosis  Echocardiogram with evidence of aortic stenosis that is moderate . The patient's most recent echocardiogram result is listed below. We will manage the valvular abnormality by avoiding volume overload; and cardiology follow up as an outpatient    Echo Saline Bubble? No  Result Date: 2/19/2025    Left Ventricle: The left ventricle is normal in size. There is mild   concentric hypertrophy. Septal motion is consistent with pacing. There is   normal systolic function with a visually estimated ejection fraction of 60   - 65%. Grade II diastolic  dysfunction.    Right Ventricle: Mild right ventricular enlargement. Systolic function   is normal.    Left Atrium: Left atrium is moderately dilated.    Right Atrium: Right atrium is mildly dilated.    Aortic Valve: There is moderate stenosis. Aortic valve area by VTI is   1.0 cm². Aortic valve peak velocity is 3.2 m/s. Mean gradient is 24 mmHg.   The dimensionless index is 0.33.    Mitral Valve: There is mild stenosis. The mean pressure gradient across   the mitral valve is 12 mmHg at a heart rate of  bpm. There is mild   regurgitation.    Tricuspid Valve: There is moderate regurgitation.    Pulmonary Artery: The estimated pulmonary artery systolic pressure is   72 mmHg.    IVC/SVC: Intermediate venous pressure at 8 mmHg.    Pericardium: There is a trivial posterior effusion.        Echo  Result Date: 2/15/2025    Left Ventricle: The left ventricle is normal in size. There is moderate   concentric hypertrophy. There is hyperdynamic systolic function with a   visually estimated ejection fraction of 70 - 75%.    Right Ventricle: Normal right ventricular cavity size. Systolic   function is normal.    Left Atrium: Left atrium is moderately dilated.    Right Atrium: Right atrium is mildly dilated.    Aortic Valve: There is moderate stenosis. Aortic valve area by VTI is   0.8 cm². Aortic valve peak velocity is 3.4 m/s. Mean gradient is 27 mmHg.   The dimensionless index is 0.27.    Mitral Valve: There is mild stenosis. The mean pressure gradient across   the mitral valve is 5 mmHg at a heart rate of 42  bpm. There is mild   regurgitation.    Tricuspid Valve: There is moderate regurgitation.    Pulmonary Artery: The estimated pulmonary artery systolic pressure is   49 mmHg.    IVC/SVC: Intermediate venous pressure at 8 mmHg.        Echo  Result Date: 10/31/2024    Left Ventricle: The left ventricle is normal in size. Normal wall   thickness. Normal wall motion. There is normal systolic function with a   visually  estimated ejection fraction of 60 - 65%. Grade II diastolic   dysfunction. Elevated left ventricular filling pressure.    Right Ventricle: Normal right ventricular cavity size. Wall thickness   is normal. Systolic function is normal.    Left Atrium: Left atrium is severely dilated.    Aortic Valve: There is moderate aortic valve sclerosis. Moderately   restricted motion. There is moderate stenosis. Aortic valve area by VTI is   1.1 cm2. Aortic valve peak velocity is 3.1 m/s. Mean gradient is 20.1   mmHg. The dimensionless index is 0.37.    Mitral Valve: There is mild regurgitation.    Tricuspid Valve: There is mild regurgitation.    Pulmonary Artery: There is pulmonary hypertension. The estimated   pulmonary artery systolic pressure is 46 mmHg.    IVC/SVC: Normal venous pressure at 3 mmHg.           Mixed hyperlipidemia  Continue home statin      Diabetes mellitus  Patient's FSGs are controlled on current medication regimen.  Last A1c reviewed-   Lab Results   Component Value Date    HGBA1C 5.9 (H) 01/14/2025     Most recent fingerstick glucose reviewed-   Recent Labs   Lab 02/21/25  1618 02/21/25  2149 02/22/25  0735 02/22/25  1136   POCTGLUCOSE 203* 140* 118* 166*       Current correctional scale  Medium  Maintain anti-hyperglycemic dose as follows-   Antihyperglycemics (From admission, onward)      Start     Stop Route Frequency Ordered    02/21/25 2100  insulin glargine U-100 (Lantus) pen 5 Units         -- SubQ Nightly 02/21/25 0645    02/21/25 0715  insulin aspart U-100 pen 5 Units         -- SubQ 3 times daily with meals 02/21/25 0645    02/15/25 1358  insulin aspart U-100 pen 0-10 Units         -- SubQ Before meals & nightly PRN 02/15/25 1258          Hold Oral hypoglycemics while patient is in the hospital.; cardiac consistent carbohydrate diet; monitor blood glucose log and titrate to effect  Prandial aspart added    Hyponatremia  Hyponatremia is likely due to renal insufficiency. The patient's most recent  sodium results are listed below.  Recent Labs     02/21/25  0526 02/22/25  0447 02/23/25  0507   * 134* 137       Plan  - nephrology consulted  - Monitor sodium Daily.   - Patient hyponatremia is stable  - monitor closely    Anxiety  -hydroxyzine p.r.n. every 8 hours for anxiety      Stage 3b chronic kidney disease  Creatine stable for now. BMP reviewed- noted Estimated Creatinine Clearance: 15.2 mL/min (A) (based on SCr of 3.8 mg/dL (H)). according to latest data. Based on current GFR, CKD stage is stage 4 - GFR 15-29.  Monitor UOP and serial BMP and adjust therapy as needed. Renally dose meds. Avoid nephrotoxic medications and procedures.      VTE Risk Mitigation (From admission, onward)           Ordered     IP VTE HIGH RISK PATIENT  Once         02/15/25 1252     Place sequential compression device  Until discontinued         02/15/25 1252     Place sequential compression device  Until discontinued         02/15/25 0802                    Discharge Planning   MIKHAIL: 2/24/2025     Code Status: Full Code   Medical Readiness for Discharge Date:   Discharge Plan A: Home                Please place Justification for CONSTANTINO Barbour DO  Department of Hospital Medicine   [facility] [place] [facility]

## 2025-02-23 NOTE — ASSESSMENT & PLAN NOTE
Baseline creatinine 2.0  On presentation creatinine 3.5    KYA due to cardiogenic shock secondary to complete heart block and initially improved. However developed NSTEMI and under went left heart cath 2/19  HD initiated 2/22    Plan/Recommendation  -plan for HD tomorrow. Called daughter and questions answered  -Keep MAP > 65  -Keep hemoglobin > 7  -Strict ins and outs  -Avoid nephrotoxic agents if possible and renally dose medications  -Avoid drastic hemodynamic changes if possible    Hyponatremia   Now on HD    Metabolic acidosis   Now on hD

## 2025-02-23 NOTE — SUBJECTIVE & OBJECTIVE
Interval History:  No acute overnight events.  Daughter at bedside.  Tolerated dialysis yesterday.  Still saying she does not feel good overall.  Crying over the bruises on her arms.  Was initially sleeping, easily awoken, but then fall back asleep.  Has been tolerating diet, but complains of nausea.    Review of Systems   Constitutional:  Negative for fatigue and fever.   Respiratory:  Negative for cough, chest tightness and shortness of breath.    Cardiovascular:  Negative for chest pain and palpitations.   Gastrointestinal:  Positive for nausea. Negative for abdominal pain and vomiting.   Genitourinary:  Negative for difficulty urinating, dysuria, frequency and urgency.   Musculoskeletal:  Negative for neck pain.   Neurological:  Negative for dizziness and headaches.   Psychiatric/Behavioral:  Negative for confusion. The patient is nervous/anxious.      Objective:     Vital Signs (Most Recent):  Temp: 97.7 °F (36.5 °C) (02/23/25 1141)  Pulse: 74 (02/23/25 1158)  Resp: 18 (02/23/25 1227)  BP: 127/61 (02/23/25 1141)  SpO2: 96 % (02/23/25 1141) Vital Signs (24h Range):  Temp:  [97.7 °F (36.5 °C)-98.4 °F (36.9 °C)] 97.7 °F (36.5 °C)  Pulse:  [72-79] 74  Resp:  [18] 18  SpO2:  [94 %-100 %] 96 %  BP: (104-132)/(50-70) 127/61     Weight: 85.9 kg (189 lb 6 oz)  Body mass index is 27.97 kg/m².    Intake/Output Summary (Last 24 hours) at 2/23/2025 1246  Last data filed at 2/23/2025 0450  Gross per 24 hour   Intake 240 ml   Output 1200 ml   Net -960 ml         Physical Exam  Vitals and nursing note reviewed.   Constitutional:       General: She is not in acute distress.     Appearance: She is ill-appearing.   HENT:      Mouth/Throat:      Mouth: Mucous membranes are moist.   Cardiovascular:      Rate and Rhythm: Normal rate and regular rhythm.      Pulses: Normal pulses.   Pulmonary:      Effort: Pulmonary effort is normal. No respiratory distress.      Breath sounds: Normal breath sounds. No wheezing or rales.       Comments: On room air.  Clean surgical dressing on left anterior chest wall  Abdominal:      General: There is no distension.      Palpations: Abdomen is soft.      Tenderness: There is no abdominal tenderness.   Musculoskeletal:      Cervical back: No tenderness.      Right lower leg: No edema.      Left lower leg: No edema.      Comments: TH DC on right upper chest wall.  The bleeding has improved.  Nontender on exam   Neurological:      General: No focal deficit present.      Mental Status: She is alert and oriented to person, place, and time.   Psychiatric:         Mood and Affect: Mood is anxious.      Comments: Patient anxious on exam, but easily falls back asleep             Significant Labs: All pertinent labs within the past 24 hours have been reviewed.    Significant Imaging: I have reviewed all pertinent imaging results/findings within the past 24 hours.

## 2025-02-23 NOTE — ASSESSMENT & PLAN NOTE
Patient with known CAD s/p stent placement and CABG, which is controlled Will continue Statin; hold aspirin and Plavix setting of impending pacing and monitor for S/Sx of angina/ACS. Continue to monitor on telemetry.     -repeat ischemic evaluation of after PPM; defer timing to cardiology  -cardiology follow-up outpatient  -patient with chest pain and elevated troponin of 3 on 02/19   -S/p heart catheterization on 02/19, [facility] with patent stents and moderate LAD/RCA disease - no culprit identified

## 2025-02-23 NOTE — PROGRESS NOTES
[facility]  Nephrology  Progress Note    Patient Name: Lorena Contreras  MRN: [id number]  Admission Date: 2/15/2025  Hospital Length of Stay: 8 days  Attending Provider: Singh Barbour DO   Primary Care Physician: Jorge Paris MD  Principal Problem:Complete heart block    Subjective:     HPI: 74 yo F with PMHx of DM2, Fibromyalgia, lymphoma s/p chemo, HTN, HLD, CVA, MI, CAD s/p PCIs CKD3b, HFpEF, and anemia presented to  ED via EMS c/o chest pain radiating to the back x 1 day. Vitals on arrival HR 45bpm. Labs on arrival  Cr 3.5, BNP 1115, EKG complet hear block. CXR normal. Pt given asprin and nitro by ems prior to arrival.     Nephrology consulted for KYA on CKD 3b    Prior records obtained and reviewed. Baseline Cr 2, last at baseline 1/14/25. Cr on arrival 3.5. Pt state she does not have a nephrologist. Pt states she was doing well prior to yesterday. She denies nsaid use, change in uop, n/v.     Interval History: sleeping this AM, when woken up states that she is still feeling ill however quickly fell back asleep    Review of patient's allergies indicates:   Allergen Reactions    Novolin 70/30 (semi-synthetic) Nausea And Vomiting     Severe vomiting on Relion 70/30    Sulfa (sulfonamide antibiotics) Anaphylaxis    Talwin [pentazocine lactate] Anaphylaxis    Victoza [liraglutide] Nausea And Vomiting    Glipizide Nausea Only    Codeine     Influenza virus vaccines Hives    Iodine and iodide containing products Hives    Levetiracetam Itching    Lyrica [pregabalin] Hallucinations    Neurontin [gabapentin]      Possible associated myoclonic jerk    Rituxan [rituximab] Hives    Zoloft [sertraline] Nausea And Vomiting     Current Facility-Administered Medications   Medication Frequency    acetaminophen tablet 650 mg Q4H PRN    albuterol-ipratropium 2.5 mg-0.5 mg/3 mL nebulizer solution 3 mL Q4H PRN    aspirin EC tablet 81 mg Daily    atorvastatin tablet 80 mg QHS    cefTRIAXone injection 2 g Q24H     dextrose 50% injection 12.5 g PRN    dextrose 50% injection 25 g PRN    FLUoxetine capsule 40 mg Daily    glucagon (human recombinant) injection 1 mg PRN    glucose chewable tablet 16 g PRN    glucose chewable tablet 24 g PRN    hydrALAZINE injection 10 mg Q4H PRN    hydrALAZINE tablet 100 mg Q8H    HYDROcodone-acetaminophen 5-325 mg per tablet 1 tablet Q4H PRN    HYDROmorphone (PF) injection 1 mg Q6H PRN    hydrOXYzine pamoate capsule 50 mg Q8H PRN    insulin aspart U-100 pen 0-10 Units QID (AC + HS) PRN    isosorbide mononitrate 24 hr tablet 60 mg Daily    lactobacillus acidophilus & bulgar 100 million cell packet 1 each BID    LORazepam tablet 1 mg Daily PRN    melatonin tablet 6 mg Nightly PRN    metoprolol tartrate (LOPRESSOR) tablet 25 mg BID    nitroGLYCERIN SL tablet 0.4 mg Q5 Min PRN    ondansetron disintegrating tablet 8 mg Q8H PRN    ondansetron injection 4 mg Q4H PRN    oxyCODONE immediate release tablet 10 mg Q4H PRN    pantoprazole EC tablet 40 mg Daily    pneumoc 20-michael conj-dip cr(PF) (PREVNAR-20 (PF)) injection Syrg 0.5 mL vaccine x 1 dose    promethazine (PHENERGAN) 6.25 mg in 0.9% NaCl 50 mL IVPB Q6H PRN    simethicone chewable tablet 80 mg TID PRN    sodium chloride 0.9% flush 10 mL PRN    sodium chloride 0.9% flush 10 mL PRN    ticagrelor tablet 60 mg BID       Objective:     Vital Signs (Most Recent):  Temp: 97.7 °F (36.5 °C) (02/23/25 1141)  Pulse: 74 (02/23/25 1158)  Resp: 18 (02/23/25 1227)  BP: 127/61 (02/23/25 1141)  SpO2: 96 % (02/23/25 1141) Vital Signs (24h Range):  Temp:  [97.7 °F (36.5 °C)-98.4 °F (36.9 °C)] 97.7 °F (36.5 °C)  Pulse:  [72-79] 74  Resp:  [18] 18  SpO2:  [94 %-100 %] 96 %  BP: (104-132)/(50-70) 127/61     Weight: 85.9 kg (189 lb 6 oz) (02/17/25 1616)  Body mass index is 27.97 kg/m².  Body surface area is 2.04 meters squared.    I/O last 3 completed shifts:  In: 480 [P.O.:480]  Out: 2201 [Urine:201; Other:2000]     Physical Exam  Constitutional:       General: She is not  in acute distress.     Appearance: She is not ill-appearing or toxic-appearing.   HENT:      Nose: Nose normal. No congestion.      Mouth/Throat:      Mouth: Mucous membranes are moist.   Eyes:      Pupils: Pupils are equal, round, and reactive to light.   Cardiovascular:      Rate and Rhythm: Tachycardia present.      Heart sounds: Murmur heard.      Comments: TDC right chest wall  Abdominal:      Tenderness: There is no abdominal tenderness.   Musculoskeletal:      Right lower leg: Edema present.      Left lower leg: Edema present.   Skin:     Findings: Bruising present.   Neurological:      Mental Status: She is alert.          Significant Labs:  All labs within the past 24 hours have been reviewed.     Significant Imaging:  Labs: Reviewed  Assessment/Plan:     Renal/  Acute kidney injury superimposed on stage 3b chronic kidney disease  Baseline creatinine 2.0  On presentation creatinine 3.5    KYA due to cardiogenic shock secondary to complete heart block and initially improved. However developed NSTEMI and under went left heart cath 2/19  HD initiated 2/22    Plan/Recommendation  -plan for HD tomorrow. Called daughter and questions answered  -Keep MAP > 65  -Keep hemoglobin > 7  -Strict ins and outs  -Avoid nephrotoxic agents if possible and renally dose medications  -Avoid drastic hemodynamic changes if possible    Hyponatremia   Now on HD    Metabolic acidosis   Now on hD        Thank you for your consult. I will follow-up with patient. Please contact us if you have any additional questions.    Alex Castañeda MD  Nephrology  [facility] [place] [facility]

## 2025-02-23 NOTE — ASSESSMENT & PLAN NOTE
Hyponatremia is likely due to renal insufficiency. The patient's most recent sodium results are listed below.  Recent Labs     02/21/25  0526 02/22/25  0447 02/23/25  0507   * 134* 137       Plan  - nephrology consulted  - Monitor sodium Daily.   - Patient hyponatremia is stable  - monitor closely

## 2025-02-24 ENCOUNTER — CLINICAL SUPPORT (OUTPATIENT)
Dept: CARDIOLOGY | Facility: HOSPITAL | Age: 75
End: 2025-02-24
Payer: MEDICARE

## 2025-02-24 ENCOUNTER — HOSPITAL ENCOUNTER (OUTPATIENT)
Dept: CARDIOLOGY | Facility: HOSPITAL | Age: 75
Discharge: HOME | End: 2025-02-24
Attending: INTERNAL MEDICINE
Payer: MEDICARE

## 2025-02-24 DIAGNOSIS — Z95.0 PRESENCE OF CARDIAC PACEMAKER: ICD-10-CM

## 2025-02-24 LAB
ANION GAP SERPL CALC-SCNC: 11 MMOL/L (ref 8–16)
BASOPHILS # BLD AUTO: 0.03 K/UL (ref 0–0.2)
BASOPHILS NFR BLD: 0.3 % (ref 0–1.9)
BUN SERPL-MCNC: 45 MG/DL (ref 8–23)
CALCIUM SERPL-MCNC: 8 MG/DL (ref 8.7–10.5)
CHLORIDE SERPL-SCNC: 102 MMOL/L (ref 95–110)
CO2 SERPL-SCNC: 22 MMOL/L (ref 23–29)
CREAT SERPL-MCNC: 2.5 MG/DL (ref 0.5–1.4)
DIFFERENTIAL METHOD BLD: ABNORMAL
EOSINOPHIL # BLD AUTO: 0.2 K/UL (ref 0–0.5)
EOSINOPHIL NFR BLD: 1.8 % (ref 0–8)
ERYTHROCYTE [DISTWIDTH] IN BLOOD BY AUTOMATED COUNT: 14.8 % (ref 11.5–14.5)
EST. GFR  (NO RACE VARIABLE): 20 ML/MIN/1.73 M^2
GLUCOSE SERPL-MCNC: 173 MG/DL (ref 70–110)
HCT VFR BLD AUTO: 26.2 % (ref 37–48.5)
HGB BLD-MCNC: 8.2 G/DL (ref 12–16)
IMM GRANULOCYTES # BLD AUTO: 0.06 K/UL (ref 0–0.04)
IMM GRANULOCYTES NFR BLD AUTO: 0.7 % (ref 0–0.5)
LYMPHOCYTES # BLD AUTO: 1 K/UL (ref 1–4.8)
LYMPHOCYTES NFR BLD: 11.5 % (ref 18–48)
MCH RBC QN AUTO: 29.1 PG (ref 27–31)
MCHC RBC AUTO-ENTMCNC: 31.3 G/DL (ref 32–36)
MCV RBC AUTO: 93 FL (ref 82–98)
MONOCYTES # BLD AUTO: 0.8 K/UL (ref 0.3–1)
MONOCYTES NFR BLD: 9.1 % (ref 4–15)
NEUTROPHILS # BLD AUTO: 6.6 K/UL (ref 1.8–7.7)
NEUTROPHILS NFR BLD: 76.6 % (ref 38–73)
NRBC BLD-RTO: 0 /100 WBC
PLATELET # BLD AUTO: 226 K/UL (ref 150–450)
PMV BLD AUTO: 11.6 FL (ref 9.2–12.9)
POCT GLUCOSE: 160 MG/DL (ref 70–110)
POCT GLUCOSE: 231 MG/DL (ref 70–110)
POCT GLUCOSE: 263 MG/DL (ref 70–110)
POTASSIUM SERPL-SCNC: 3.8 MMOL/L (ref 3.5–5.1)
RBC # BLD AUTO: 2.82 M/UL (ref 4–5.4)
SODIUM SERPL-SCNC: 135 MMOL/L (ref 136–145)
WBC # BLD AUTO: 8.68 K/UL (ref 3.9–12.7)

## 2025-02-24 PROCEDURE — 25000003 PHARM REV CODE 250: Mod: HCNC | Performed by: HOSPITALIST

## 2025-02-24 PROCEDURE — 25000003 PHARM REV CODE 250: Mod: HCNC | Performed by: STUDENT IN AN ORGANIZED HEALTH CARE EDUCATION/TRAINING PROGRAM

## 2025-02-24 PROCEDURE — 99900035 HC TECH TIME PER 15 MIN (STAT): Mod: HCNC

## 2025-02-24 PROCEDURE — 25000003 PHARM REV CODE 250: Mod: HCNC | Performed by: INTERNAL MEDICINE

## 2025-02-24 PROCEDURE — 25000003 PHARM REV CODE 250: Mod: HCNC | Performed by: EMERGENCY MEDICINE

## 2025-02-24 PROCEDURE — 85025 COMPLETE CBC W/AUTO DIFF WBC: CPT | Mod: HCNC | Performed by: STUDENT IN AN ORGANIZED HEALTH CARE EDUCATION/TRAINING PROGRAM

## 2025-02-24 PROCEDURE — 25000003 PHARM REV CODE 250: Mod: HCNC

## 2025-02-24 PROCEDURE — 80100016 HC MAINTENANCE HEMODIALYSIS: Mod: HCNC

## 2025-02-24 PROCEDURE — 36415 COLL VENOUS BLD VENIPUNCTURE: CPT | Mod: HCNC | Performed by: STUDENT IN AN ORGANIZED HEALTH CARE EDUCATION/TRAINING PROGRAM

## 2025-02-24 PROCEDURE — 94761 N-INVAS EAR/PLS OXIMETRY MLT: CPT | Mod: HCNC

## 2025-02-24 PROCEDURE — 21400001 HC TELEMETRY ROOM: Mod: HCNC

## 2025-02-24 PROCEDURE — 80048 BASIC METABOLIC PNL TOTAL CA: CPT | Mod: HCNC | Performed by: STUDENT IN AN ORGANIZED HEALTH CARE EDUCATION/TRAINING PROGRAM

## 2025-02-24 PROCEDURE — 90935 HEMODIALYSIS ONE EVALUATION: CPT | Mod: HCNC,,, | Performed by: INTERNAL MEDICINE

## 2025-02-24 PROCEDURE — 25000242 PHARM REV CODE 250 ALT 637 W/ HCPCS: Mod: HCNC | Performed by: INTERNAL MEDICINE

## 2025-02-24 PROCEDURE — 97530 THERAPEUTIC ACTIVITIES: CPT | Mod: HCNC,CQ

## 2025-02-24 RX ORDER — DICYCLOMINE HYDROCHLORIDE 10 MG/ML
10 INJECTION INTRAMUSCULAR EVERY 6 HOURS PRN
Status: DISCONTINUED | OUTPATIENT
Start: 2025-02-25 | End: 2025-02-25 | Stop reason: HOSPADM

## 2025-02-24 RX ORDER — ALUMINUM HYDROXIDE, MAGNESIUM HYDROXIDE, AND SIMETHICONE 1200; 120; 1200 MG/30ML; MG/30ML; MG/30ML
30 SUSPENSION ORAL ONCE
Status: COMPLETED | OUTPATIENT
Start: 2025-02-25 | End: 2025-02-25

## 2025-02-24 RX ORDER — LIDOCAINE HYDROCHLORIDE 20 MG/ML
15 SOLUTION OROPHARYNGEAL ONCE
Status: COMPLETED | OUTPATIENT
Start: 2025-02-25 | End: 2025-02-25

## 2025-02-24 RX ADMIN — LACTOBACILLUS ACIDOPHILUS / LACTOBACILLUS BULGARICUS 1 EACH: 100 MILLION CFU STRENGTH GRANULES at 08:02

## 2025-02-24 RX ADMIN — METOPROLOL TARTRATE 25 MG: 25 TABLET, FILM COATED ORAL at 09:02

## 2025-02-24 RX ADMIN — ASPIRIN 81 MG: 81 TABLET, COATED ORAL at 08:02

## 2025-02-24 RX ADMIN — HYDROCODONE BITARTRATE AND ACETAMINOPHEN 1 TABLET: 5; 325 TABLET ORAL at 02:02

## 2025-02-24 RX ADMIN — METOPROLOL TARTRATE 25 MG: 25 TABLET, FILM COATED ORAL at 08:02

## 2025-02-24 RX ADMIN — OXYCODONE 10 MG: 5 TABLET ORAL at 01:02

## 2025-02-24 RX ADMIN — HYDRALAZINE HYDROCHLORIDE 100 MG: 25 TABLET ORAL at 09:02

## 2025-02-24 RX ADMIN — PANTOPRAZOLE SODIUM 40 MG: 40 TABLET, DELAYED RELEASE ORAL at 08:02

## 2025-02-24 RX ADMIN — INSULIN ASPART 3 UNITS: 100 INJECTION, SOLUTION INTRAVENOUS; SUBCUTANEOUS at 09:02

## 2025-02-24 RX ADMIN — TICAGRELOR 60 MG: 60 TABLET ORAL at 09:02

## 2025-02-24 RX ADMIN — CEPHALEXIN 500 MG: 500 CAPSULE ORAL at 09:02

## 2025-02-24 RX ADMIN — CEPHALEXIN 500 MG: 500 CAPSULE ORAL at 06:02

## 2025-02-24 RX ADMIN — ISOSORBIDE MONONITRATE 60 MG: 30 TABLET, EXTENDED RELEASE ORAL at 08:02

## 2025-02-24 RX ADMIN — SIMETHICONE 80 MG: 80 TABLET, CHEWABLE ORAL at 09:02

## 2025-02-24 RX ADMIN — LACTOBACILLUS ACIDOPHILUS / LACTOBACILLUS BULGARICUS 1 EACH: 100 MILLION CFU STRENGTH GRANULES at 09:02

## 2025-02-24 RX ADMIN — Medication 6 MG: at 11:02

## 2025-02-24 RX ADMIN — TICAGRELOR 60 MG: 60 TABLET ORAL at 08:02

## 2025-02-24 RX ADMIN — FLUOXETINE HYDROCHLORIDE 40 MG: 20 CAPSULE ORAL at 08:02

## 2025-02-24 RX ADMIN — ATORVASTATIN CALCIUM 80 MG: 40 TABLET, FILM COATED ORAL at 09:02

## 2025-02-24 NOTE — PLAN OF CARE
Changes in medical condition or discharge plan: Per MD, no acute overnight events. Pt complaining that she isn't getting better and feeling anxious. Has been tolerating diet,but complains of nausea. Set up dialysis.    Does patient need new DME? TBD    Follow up appts needed: TBD    Medically stable: No    Estimated Discharge Date:  1-2 days    CM spoke to pt's daughter, Noelle and she didn't have a preference for dialysis.  CM emailed [facility] referral. CM will follow up as needed. Pt will resume home health services with [facility].     02/24/25 1156   Discharge Reassessment   Assessment Type Discharge Planning Reassessment   Did the patient's condition or plan change since previous assessment? Yes   Discharge Plan discussed with: Adult children   Communicated MIKHAIL with patient/caregiver Yes   Discharge Plan A Home with family   Discharge Plan B Other  (tbd)   DME Needed Upon Discharge  other (see comments)  (tbd)   Transition of Care Barriers None   Why the patient remains in the hospital Requires continued medical care   Post-Acute Status   Post-Acute Authorization Dialysis   Diaylsis Status Pending medical clearance/testing   Coverage [employer] MANAGED MEDICARE - [facility]   Patient choice form signed by patient/caregiver List from [facility] Post[facility]Ac[facility] Care   Discharge Delays (!) Post-Acute Set-up

## 2025-02-24 NOTE — PROGRESS NOTES
Patient seen and examined during hemodialysis.  /54, HR 69. . AP -100,  105. RIJ TDC.   Tolerating treatment well. No complaints. +UOP. Hoping to go home today.    ATN on HD - HD today; continue HD MWF.  Anemia of CKD - hgb 8.2; below goal but stable. Ordered iron studies.   Secondary HPTH - will check CCa and phos in AM.   Hyponatremia - stable; will start fluid restriction.         Thank you for allowing me to participate in the care of this patient. Please call with any questions.     Marifer Perry MD  [facility]  [phone]

## 2025-02-24 NOTE — PT/OT/SLP PROGRESS
Occupational Therapy      Patient Name:  Lorena Contreras   MRN:  [id number]    Patient not seen today secondary to Dialysis. Will follow-up later as able.    2/24/2025

## 2025-02-24 NOTE — PROGRESS NOTES
[facility] Medicine  Progress Note    Patient Name: Lorena Contreras  MRN: [id number]  Patient Class: IP- Inpatient   Admission Date: 2/15/2025  Length of Stay: 9 days  Attending Physician: Singh Barbour DO  Primary Care Provider: Jorge Paris MD        Subjective     Principal Problem:Complete heart block        HPI:  A pleasant  72 yo F with PMHx of  DM2, Fibromyalgia, lymphoma s/p chemo, HTN, HLD, CVA, MI, CAD s/p PCIs  CKD3b, HFpEF, and anemia who presented to the ED with a chief complaint of chest pain radiating to the back with onset a day before presentation.    Pain was said to be sudden in onset; no associated nausea or diaphoresis.  No Preceding PND orthopnea or leg swelling; pain was described as different from her previous coronary events.  Patient however endorsed dizziness but this has been going on for some time.  She denied any syncopal event  Symptoms progress and this prompted patient to come to the ED    Retrospective chart review indicates multiple ED visits for observation for atypical chest pain.  Cardiac history is significant for CAD with JESIKA x 2 to RCA 3/29/19 - JESIKA to RI and Cx 1/8/20      On presentation to the ED; patient was bradycardic to 32 BPM which subsequently improved and mildly hypertensive.  EKG showed complete heart block.  Patient was admitted to the ICU for further management.    Overview/Hospital Course:  73-year-old female with history of diabetes, anxiety,HTN, HLD, CVA, MI, CAD s/p PCIs  CKD3b, HFpEF, and anemia admitted to ICU on 02/15/2025 for complete heart block and KYA.  Cardiology consulted- patient s/p ppm on 02/17.  Patient to start aspirin and Brilinta on 02/18.   Patient with chest pain and tachycardia overnight on 02/18. Found to have elevated troponin 1.9 and peaked at 3.3.  EKG noted and reviewed with NO STEMI and unchanged from previous on 2/17. Device interrogated - normal function. No PMT Discussed with cardiology. S/p heart  catheterization on 02/19, [facility] with patent stents and moderate LAD/RCA disease - no culprit identified . Continue medical Rx per cardiology. Nephrology consulted for KYA on CKD. Pt with severe nausea and vomiting, BUN >100. THDC placed on 02/21, and 1st HD session started on 02/21, 2nd session on 02/22 and plan for 3rd session on 02/24.     Interval History:  No acute overnight events.   Still saying she isnt getting better and feeling anxious. Has been tolerating diet, but complains of nausea.    Review of Systems   Constitutional:  Negative for fatigue and fever.   Respiratory:  Negative for cough, chest tightness and shortness of breath.    Cardiovascular:  Negative for chest pain and palpitations.   Gastrointestinal:  Positive for nausea. Negative for abdominal pain and vomiting.   Genitourinary:  Negative for difficulty urinating, dysuria, frequency and urgency.   Musculoskeletal:  Negative for neck pain.   Neurological:  Negative for dizziness and headaches.   Psychiatric/Behavioral:  Negative for confusion. The patient is nervous/anxious.      Objective:     Vital Signs (Most Recent):  Temp: 97.9 °F (36.6 °C) (02/24/25 0719)  Pulse: 71 (02/24/25 0804)  Resp: 18 (02/24/25 0804)  BP: (!) 165/72 (02/24/25 0719)  SpO2: 96 % (02/24/25 0804) Vital Signs (24h Range):  Temp:  [97.7 °F (36.5 °C)-98.2 °F (36.8 °C)] 97.9 °F (36.6 °C)  Pulse:  [66-77] 71  Resp:  [18] 18  SpO2:  [94 %-99 %] 96 %  BP: (127-165)/(55-72) 165/72     Weight: 85.9 kg (189 lb 6 oz)  Body mass index is 27.97 kg/m².    Intake/Output Summary (Last 24 hours) at 2/24/2025 1041  Last data filed at 2/24/2025 1003  Gross per 24 hour   Intake 480 ml   Output 450 ml   Net 30 ml         Physical Exam  Vitals and nursing note reviewed.   Constitutional:       General: She is not in acute distress.     Appearance: She is ill-appearing.   HENT:      Mouth/Throat:      Mouth: Mucous membranes are moist.   Cardiovascular:      Rate and Rhythm: Normal rate and  regular rhythm.      Pulses: Normal pulses.   Pulmonary:      Effort: Pulmonary effort is normal. No respiratory distress.      Breath sounds: Normal breath sounds. No wheezing or rales.      Comments: On room air.  Clean surgical dressing on left anterior chest wall  Abdominal:      General: There is no distension.      Palpations: Abdomen is soft.      Tenderness: There is no abdominal tenderness.   Musculoskeletal:      Cervical back: No tenderness.      Right lower leg: No edema.      Left lower leg: No edema.      Comments: TH DC on right upper chest wall.  The bleeding has improved.  Nontender on exam   Neurological:      General: No focal deficit present.      Mental Status: She is alert and oriented to person, place, and time.   Psychiatric:         Mood and Affect: Mood is anxious.      Comments: Patient anxious on exam, but easily falls back asleep             Significant Labs: All pertinent labs within the past 24 hours have been reviewed.    Significant Imaging: I have reviewed all pertinent imaging results/findings within the past 24 hours.    Assessment and Plan     * Complete heart block  -Hx of chest pain on presentation; different from previous anginal pains  -EKG showed complete heart block  -Discontinue all AV frances blocking agents  -Monitor patient on telemetry  -S/p Medtronic PPM on 02/17  -Continue Keflex; arm sling per Cardiology  -Restart aspirin and Brilinta 02/18/25  -Patient with chest pain and tachycardia on 02/19. Troponin peaked at 3  -Discussed with cardiology, S/p heart catheterization on 02/19, [facility] with patent stents and moderate LAD/RCA disease - no culprit identified   -Change antibiotics to ceftriaxone to cover UTI, urine culture with Klebsiella oxytoca  -completed treatment for UTI on 02/23/2025  -resume Keflex for prophylaxis antibiotics (3 days) per Cardiology      Cardiac pacemaker in situ  -s/p ppm on 02/17.  -resume Keflex on 02/24    Acute kidney injury superimposed on  stage 3b chronic kidney disease  KYA is likely due to pre-renal azotemia due to intravascular volume depletion. Baseline creatinine is  1.6 to 2.2 . Most recent creatinine and eGFR are listed below.  Recent Labs     02/22/25  0447 02/23/25  0507 02/24/25  0437   CREATININE 3.0* 2.1* 2.5*   EGFRNORACEVR 16* 24* 20*        Plan  - KYA is stable  - Avoid nephrotoxins and renally dose meds for GFR listed above  - Monitor urine output, serial BMP, and adjust therapy as needed  - nephrology consulted: Initiated 1st session of dialysis on 02/21, completed 2nd dialysis on 02/22  -plan for 3rd session today on 02/24  - monitor closely     Coronary artery disease of native artery of native heart with stable angina pectoris  Patient with known CAD s/p stent placement and CABG, which is controlled Will continue Statin; hold aspirin and Plavix setting of impending pacing and monitor for S/Sx of angina/ACS. Continue to monitor on telemetry.     -repeat ischemic evaluation of after PPM; defer timing to cardiology  -cardiology follow-up outpatient  -patient with chest pain and elevated troponin of 3 on 02/19   -S/p heart catheterization on 02/19, [facility] with patent stents and moderate LAD/RCA disease - no culprit identified     Aortic stenosis  Echocardiogram with evidence of aortic stenosis that is moderate . The patient's most recent echocardiogram result is listed below. We will manage the valvular abnormality by avoiding volume overload; and cardiology follow up as an outpatient    Echo Saline Bubble? No  Result Date: 2/19/2025    Left Ventricle: The left ventricle is normal in size. There is mild   concentric hypertrophy. Septal motion is consistent with pacing. There is   normal systolic function with a visually estimated ejection fraction of 60   - 65%. Grade II diastolic dysfunction.    Right Ventricle: Mild right ventricular enlargement. Systolic function   is normal.    Left Atrium: Left atrium is moderately dilated.     Right Atrium: Right atrium is mildly dilated.    Aortic Valve: There is moderate stenosis. Aortic valve area by VTI is   1.0 cm². Aortic valve peak velocity is 3.2 m/s. Mean gradient is 24 mmHg.   The dimensionless index is 0.33.    Mitral Valve: There is mild stenosis. The mean pressure gradient across   the mitral valve is 12 mmHg at a heart rate of  bpm. There is mild   regurgitation.    Tricuspid Valve: There is moderate regurgitation.    Pulmonary Artery: The estimated pulmonary artery systolic pressure is   72 mmHg.    IVC/SVC: Intermediate venous pressure at 8 mmHg.    Pericardium: There is a trivial posterior effusion.        Echo  Result Date: 2/15/2025    Left Ventricle: The left ventricle is normal in size. There is moderate   concentric hypertrophy. There is hyperdynamic systolic function with a   visually estimated ejection fraction of 70 - 75%.    Right Ventricle: Normal right ventricular cavity size. Systolic   function is normal.    Left Atrium: Left atrium is moderately dilated.    Right Atrium: Right atrium is mildly dilated.    Aortic Valve: There is moderate stenosis. Aortic valve area by VTI is   0.8 cm². Aortic valve peak velocity is 3.4 m/s. Mean gradient is 27 mmHg.   The dimensionless index is 0.27.    Mitral Valve: There is mild stenosis. The mean pressure gradient across   the mitral valve is 5 mmHg at a heart rate of 42  bpm. There is mild   regurgitation.    Tricuspid Valve: There is moderate regurgitation.    Pulmonary Artery: The estimated pulmonary artery systolic pressure is   49 mmHg.    IVC/SVC: Intermediate venous pressure at 8 mmHg.        Echo  Result Date: 10/31/2024    Left Ventricle: The left ventricle is normal in size. Normal wall   thickness. Normal wall motion. There is normal systolic function with a   visually estimated ejection fraction of 60 - 65%. Grade II diastolic   dysfunction. Elevated left ventricular filling pressure.    Right Ventricle: Normal right  ventricular cavity size. Wall thickness   is normal. Systolic function is normal.    Left Atrium: Left atrium is severely dilated.    Aortic Valve: There is moderate aortic valve sclerosis. Moderately   restricted motion. There is moderate stenosis. Aortic valve area by VTI is   1.1 cm2. Aortic valve peak velocity is 3.1 m/s. Mean gradient is 20.1   mmHg. The dimensionless index is 0.37.    Mitral Valve: There is mild regurgitation.    Tricuspid Valve: There is mild regurgitation.    Pulmonary Artery: There is pulmonary hypertension. The estimated   pulmonary artery systolic pressure is 46 mmHg.    IVC/SVC: Normal venous pressure at 3 mmHg.           Mixed hyperlipidemia  Continue home statin      Diabetes mellitus  Patient's FSGs are controlled on current medication regimen.  Last A1c reviewed-   Lab Results   Component Value Date    HGBA1C 5.9 (H) 01/14/2025     Most recent fingerstick glucose reviewed-   Recent Labs   Lab 02/23/25  1142 02/23/25  1616 02/23/25  1935 02/24/25  0717   POCTGLUCOSE 106 118* 99 160*       Current correctional scale  Medium  Maintain anti-hyperglycemic dose as follows-   Antihyperglycemics (From admission, onward)      Start     Stop Route Frequency Ordered    02/15/25 1358  insulin aspart U-100 pen 0-10 Units         -- SubQ Before meals & nightly PRN 02/15/25 1258          Hold Oral hypoglycemics while patient is in the hospital.; cardiac consistent carbohydrate diet; monitor blood glucose log and titrate to effect  Prandial aspart added    Hyponatremia  Hyponatremia is likely due to renal insufficiency. The patient's most recent sodium results are listed below.  Recent Labs     02/22/25  0447 02/23/25  0507 02/24/25  0437   * 137 135*       Plan  - nephrology consulted  - Monitor sodium Daily.   - Patient hyponatremia is stable  - monitor closely    Anxiety  -hydroxyzine p.r.n. every 8 hours for anxiety      Stage 3b chronic kidney disease  Creatine stable for now. BMP  reviewed- noted Estimated Creatinine Clearance: 15.2 mL/min (A) (based on SCr of 3.8 mg/dL (H)). according to latest data. Based on current GFR, CKD stage is stage 4 - GFR 15-29.  Monitor UOP and serial BMP and adjust therapy as needed. Renally dose meds. Avoid nephrotoxic medications and procedures.      VTE Risk Mitigation (From admission, onward)           Ordered     IP VTE HIGH RISK PATIENT  Once         02/15/25 1252     Place sequential compression device  Until discontinued         02/15/25 1252     Place sequential compression device  Until discontinued         02/15/25 0802                    Discharge Planning   MIKHAIL: 2/24/2025     Code Status: Full Code   Medical Readiness for Discharge Date:   Discharge Plan A: Home                Please place Justification for CONSTANTINO Barbour DO  Department of Hospital Medicine   [facility] [place] [facility] S[facility]

## 2025-02-24 NOTE — NURSING
[facility]  Nurses Note -- 4 Eyes      2/23/2025       Skin assessed on: Q Shift      [x] No Pressure Injuries Present    [x]Prevention Measures Documented    With redness noted in the sacrum    [] Yes LDA  for Pressure Injury Previously documented     [] Yes New Pressure Injury Discovered   [] LDA for New Pressure Injury Added      Attending RN:  Suri Chadwick, RN     Second RN:  Stacey JavedRN

## 2025-02-24 NOTE — ASSESSMENT & PLAN NOTE
-Hx of chest pain on presentation; different from previous anginal pains  -EKG showed complete heart block  -Discontinue all AV frances blocking agents  -Monitor patient on telemetry  -S/p Medtronic PPM on 02/17  -Continue Keflex; arm sling per Cardiology  -Restart aspirin and Brilinta 02/18/25  -Patient with chest pain and tachycardia on 02/19. Troponin peaked at 3  -Discussed with cardiology, S/p heart catheterization on 02/19, [facility] with patent stents and moderate LAD/RCA disease - no culprit identified   -Change antibiotics to ceftriaxone to cover UTI, urine culture with Klebsiella oxytoca  -completed treatment for UTI on 02/23/2025  -resume Keflex for prophylaxis antibiotics (3 days) per Cardiology

## 2025-02-24 NOTE — PT/OT/SLP PROGRESS
"Physical Therapy Treatment    Patient Name:  Lorena Contreras   MRN:  [id number]    Recommendations:     Discharge Recommendations: Low Intensity Therapy  Discharge Equipment Recommendations: none  Barriers to discharge: None    Assessment:     Lorena Contreras is a 74 y.o. female admitted with a medical diagnosis of Complete heart block.  She presents with the following impairments/functional limitations: weakness, impaired endurance, impaired self care skills, impaired functional mobility, gait instability, impaired balance, decreased coordination, decreased upper extremity function, decreased lower extremity function, decreased safety awareness, pain, decreased ROM .    Rehab Prognosis: Fair; patient would benefit from acute skilled PT services to address these deficits and reach maximum level of function.    Recent Surgery: Procedure(s) (LRB):  Left heart cath (Left) 5 Days Post-Op    Plan:     During this hospitalization, patient to be seen  (3-5x/week) to address the identified rehab impairments via gait training, therapeutic activities, therapeutic exercises, neuromuscular re-education and progress toward the following goals:    Plan of Care Expires:  02/25/25    Subjective     Chief Complaint: pain and fatigue.   Patient/Family Comments/goals: pt declined OOB mobility, c/o being fatigue and pain. " I didn't sleep at all last night ."   Pain/Comfort:  Pain Rating 1: 8/10  Pain Addressed 1: Pre-medicate for activity, Reposition, Nurse notified  Pain Rating Post-Intervention 1: 8/10      Objective:     Communicated with nurse prior to session.  Patient found HOB elevated with telemetry, peripheral IV, bed alarm upon PT entry to room.     General Precautions: Standard, fall  Orthopedic Precautions: N/A  Braces: N/A  Respiratory Status: Room air     Functional Mobility:  Bed Mobility:     Rolling Left:  supervision  Scooting: supervision  Bridging: supervision      AM-PAC 6 CLICK MOBILITY  Turning over in " bed (including adjusting bedclothes, sheets and blankets)?: 4  Sitting down on and standing up from a chair with arms (e.g., wheelchair, bedside commode, etc.): 3  Moving from lying on back to sitting on the side of the bed?: 3  Moving to and from a bed to a chair (including a wheelchair)?: 3  Need to walk in hospital room?: 3  Climbing 3-5 steps with a railing?: 2  Basic Mobility Total Score: 18       Treatment & Education:  Instructed pt to perform BLE x 10 reps x 2 : AP, QS, ankle circumduction, GS in bed .  Encouraged pt to perform BLE EX'S as above throughout the day .   Educated pt to perform rolling/turning/ bridging to repositioning while in bed every 2 h to prevent pressure sores . Pt verbalized understanding.   Patient left with bed in chair position , pillow to offload R side with BLE/ BUE elevated, heels offloading  all lines intact, call button in reach, bed alarm on, and nurse notified..    GOALS:   Multidisciplinary Problems       Physical Therapy Goals          Problem: Physical Therapy    Goal Priority Disciplines Outcome Interventions   Physical Therapy Goal     PT, PT/OT Progressing    Description: Goals to be met by: 25     Patient will increase functional independence with mobility by performin. Supine to sit with Stand-by Assistance  2. Sit to stand transfer with Stand-by Assistance  3. Gait  x 25-50 feet with Stand-by Assistance using Rolling Walker.   4. Ascend/descend 4 stair with right Handrails and Minimal  Assistance .   5. Lower extremity exercise program x10 reps per handout, with supervision                         DME Justifications:  No DME recommended requiring DME justifications    Time Tracking:     PT Received On: 25  PT Start Time:      PT Stop Time: 1524  PT Total Time (min): 12 min     Billable Minutes: Therapeutic Activity 12    Treatment Type: Treatment  PT/PTA: PTA     Number of PTA visits since last PT visit: 2025

## 2025-02-24 NOTE — SUBJECTIVE & OBJECTIVE
Interval History:  No acute overnight events.   Still saying she isnt getting better and feeling anxious. Has been tolerating diet, but complains of nausea.    Review of Systems   Constitutional:  Negative for fatigue and fever.   Respiratory:  Negative for cough, chest tightness and shortness of breath.    Cardiovascular:  Negative for chest pain and palpitations.   Gastrointestinal:  Positive for nausea. Negative for abdominal pain and vomiting.   Genitourinary:  Negative for difficulty urinating, dysuria, frequency and urgency.   Musculoskeletal:  Negative for neck pain.   Neurological:  Negative for dizziness and headaches.   Psychiatric/Behavioral:  Negative for confusion. The patient is nervous/anxious.      Objective:     Vital Signs (Most Recent):  Temp: 97.9 °F (36.6 °C) (02/24/25 0719)  Pulse: 71 (02/24/25 0804)  Resp: 18 (02/24/25 0804)  BP: (!) 165/72 (02/24/25 0719)  SpO2: 96 % (02/24/25 0804) Vital Signs (24h Range):  Temp:  [97.7 °F (36.5 °C)-98.2 °F (36.8 °C)] 97.9 °F (36.6 °C)  Pulse:  [66-77] 71  Resp:  [18] 18  SpO2:  [94 %-99 %] 96 %  BP: (127-165)/(55-72) 165/72     Weight: 85.9 kg (189 lb 6 oz)  Body mass index is 27.97 kg/m².    Intake/Output Summary (Last 24 hours) at 2/24/2025 1041  Last data filed at 2/24/2025 1003  Gross per 24 hour   Intake 480 ml   Output 450 ml   Net 30 ml         Physical Exam  Vitals and nursing note reviewed.   Constitutional:       General: She is not in acute distress.     Appearance: She is ill-appearing.   HENT:      Mouth/Throat:      Mouth: Mucous membranes are moist.   Cardiovascular:      Rate and Rhythm: Normal rate and regular rhythm.      Pulses: Normal pulses.   Pulmonary:      Effort: Pulmonary effort is normal. No respiratory distress.      Breath sounds: Normal breath sounds. No wheezing or rales.      Comments: On room air.  Clean surgical dressing on left anterior chest wall  Abdominal:      General: There is no distension.      Palpations: Abdomen  is soft.      Tenderness: There is no abdominal tenderness.   Musculoskeletal:      Cervical back: No tenderness.      Right lower leg: No edema.      Left lower leg: No edema.      Comments: TH DC on right upper chest wall.  The bleeding has improved.  Nontender on exam   Neurological:      General: No focal deficit present.      Mental Status: She is alert and oriented to person, place, and time.   Psychiatric:         Mood and Affect: Mood is anxious.      Comments: Patient anxious on exam, but easily falls back asleep             Significant Labs: All pertinent labs within the past 24 hours have been reviewed.    Significant Imaging: I have reviewed all pertinent imaging results/findings within the past 24 hours.

## 2025-02-24 NOTE — ASSESSMENT & PLAN NOTE
Hyponatremia is likely due to renal insufficiency. The patient's most recent sodium results are listed below.  Recent Labs     02/22/25  0447 02/23/25  0507 02/24/25  0437   * 137 135*       Plan  - nephrology consulted  - Monitor sodium Daily.   - Patient hyponatremia is stable  - monitor closely

## 2025-02-24 NOTE — PT/OT/SLP PROGRESS
Physical Therapy      Patient Name:  Lorena Contreras   MRN:  [id number]    Patient not seen today secondary to Dialysis. Will follow-up as able later hour/day .

## 2025-02-24 NOTE — ASSESSMENT & PLAN NOTE
Patient with known CAD s/p stent placement and CABG, which is controlled Will continue Statin; hold aspirin and Plavix setting of impending pacing and monitor for S/Sx of angina/ACS. Continue to monitor on telemetry.     -repeat ischemic evaluation of after PPM; defer timing to cardiology  -cardiology follow-up outpatient  -patient with chest pain and elevated troponin of 3 on 02/19   -S/p heart catheterization on 02/19, [facility] with patent stents and moderate LAD/RCA disease - no culprit identified

## 2025-02-24 NOTE — NURSING
"Patient reported that she feels a "shocking" sensation in her pacemaker site when she was move. Patient did not report any chest pain. VS were stable. Dr. Tovar was informed through secure chat. Dr. Tovar ordered cardiac device check.   "

## 2025-02-24 NOTE — ASSESSMENT & PLAN NOTE
Patient's FSGs are controlled on current medication regimen.  Last A1c reviewed-   Lab Results   Component Value Date    HGBA1C 5.9 (H) 01/14/2025     Most recent fingerstick glucose reviewed-   Recent Labs   Lab 02/23/25  1142 02/23/25  1616 02/23/25  1935 02/24/25  0717   POCTGLUCOSE 106 118* 99 160*       Current correctional scale  Medium  Maintain anti-hyperglycemic dose as follows-   Antihyperglycemics (From admission, onward)    Start     Stop Route Frequency Ordered    02/15/25 1358  insulin aspart U-100 pen 0-10 Units         -- SubQ Before meals & nightly PRN 02/15/25 1258        Hold Oral hypoglycemics while patient is in the hospital.; cardiac consistent carbohydrate diet; monitor blood glucose log and titrate to effect  Prandial aspart added

## 2025-02-24 NOTE — ASSESSMENT & PLAN NOTE
KYA is likely due to pre-renal azotemia due to intravascular volume depletion. Baseline creatinine is  1.6 to 2.2 . Most recent creatinine and eGFR are listed below.  Recent Labs     02/22/25  0447 02/23/25  0507 02/24/25  0437   CREATININE 3.0* 2.1* 2.5*   EGFRNORACEVR 16* 24* 20*        Plan  - KYA is stable  - Avoid nephrotoxins and renally dose meds for GFR listed above  - Monitor urine output, serial BMP, and adjust therapy as needed  - nephrology consulted: Initiated 1st session of dialysis on 02/21, completed 2nd dialysis on 02/22  -plan for 3rd session today on 02/24  - monitor closely

## 2025-02-25 ENCOUNTER — PATIENT MESSAGE (OUTPATIENT)
Dept: FAMILY MEDICINE | Facility: CLINIC | Age: 75
End: 2025-02-25
Payer: MEDICARE

## 2025-02-25 VITALS
HEIGHT: 69 IN | DIASTOLIC BLOOD PRESSURE: 77 MMHG | RESPIRATION RATE: 18 BRPM | TEMPERATURE: 99 F | OXYGEN SATURATION: 94 % | BODY MASS INDEX: 28.05 KG/M2 | SYSTOLIC BLOOD PRESSURE: 187 MMHG | HEART RATE: 82 BPM | WEIGHT: 189.38 LBS

## 2025-02-25 PROBLEM — L89.156 PRESSURE INJURY OF DEEP TISSUE OF SACRAL REGION: Status: ACTIVE | Noted: 2025-02-25

## 2025-02-25 LAB
ALBUMIN SERPL BCP-MCNC: 2.5 G/DL (ref 3.5–5.2)
ANION GAP SERPL CALC-SCNC: 8 MMOL/L (ref 8–16)
BASOPHILS # BLD AUTO: 0.05 K/UL (ref 0–0.2)
BASOPHILS NFR BLD: 0.5 % (ref 0–1.9)
BUN SERPL-MCNC: 26 MG/DL (ref 8–23)
CALCIUM SERPL-MCNC: 8.1 MG/DL (ref 8.7–10.5)
CHLORIDE SERPL-SCNC: 103 MMOL/L (ref 95–110)
CO2 SERPL-SCNC: 23 MMOL/L (ref 23–29)
CREAT SERPL-MCNC: 1.8 MG/DL (ref 0.5–1.4)
DIFFERENTIAL METHOD BLD: ABNORMAL
EOSINOPHIL # BLD AUTO: 0.2 K/UL (ref 0–0.5)
EOSINOPHIL NFR BLD: 1.6 % (ref 0–8)
ERYTHROCYTE [DISTWIDTH] IN BLOOD BY AUTOMATED COUNT: 14.8 % (ref 11.5–14.5)
EST. GFR  (NO RACE VARIABLE): 29 ML/MIN/1.73 M^2
FERRITIN SERPL-MCNC: 206 NG/ML (ref 20–300)
GLUCOSE SERPL-MCNC: 179 MG/DL (ref 70–110)
HCT VFR BLD AUTO: 26.9 % (ref 37–48.5)
HGB BLD-MCNC: 8.2 G/DL (ref 12–16)
IMM GRANULOCYTES # BLD AUTO: 0.06 K/UL (ref 0–0.04)
IMM GRANULOCYTES NFR BLD AUTO: 0.6 % (ref 0–0.5)
IRON SERPL-MCNC: 35 UG/DL (ref 30–160)
LYMPHOCYTES # BLD AUTO: 1 K/UL (ref 1–4.8)
LYMPHOCYTES NFR BLD: 10.5 % (ref 18–48)
MCH RBC QN AUTO: 28.2 PG (ref 27–31)
MCHC RBC AUTO-ENTMCNC: 30.5 G/DL (ref 32–36)
MCV RBC AUTO: 92 FL (ref 82–98)
MONOCYTES # BLD AUTO: 0.9 K/UL (ref 0.3–1)
MONOCYTES NFR BLD: 8.9 % (ref 4–15)
NEUTROPHILS # BLD AUTO: 7.6 K/UL (ref 1.8–7.7)
NEUTROPHILS NFR BLD: 77.9 % (ref 38–73)
NRBC BLD-RTO: 0 /100 WBC
PHOSPHATE SERPL-MCNC: 2.8 MG/DL (ref 2.7–4.5)
PLATELET # BLD AUTO: 229 K/UL (ref 150–450)
PMV BLD AUTO: 11.6 FL (ref 9.2–12.9)
POCT GLUCOSE: 187 MG/DL (ref 70–110)
POCT GLUCOSE: 213 MG/DL (ref 70–110)
POCT GLUCOSE: 216 MG/DL (ref 70–110)
POTASSIUM SERPL-SCNC: 4.2 MMOL/L (ref 3.5–5.1)
RBC # BLD AUTO: 2.91 M/UL (ref 4–5.4)
SATURATED IRON: 12 % (ref 20–50)
SODIUM SERPL-SCNC: 134 MMOL/L (ref 136–145)
TOTAL IRON BINDING CAPACITY: 286 UG/DL (ref 250–450)
TRANSFERRIN SERPL-MCNC: 193 MG/DL (ref 200–375)
WBC # BLD AUTO: 9.79 K/UL (ref 3.9–12.7)

## 2025-02-25 PROCEDURE — 97530 THERAPEUTIC ACTIVITIES: CPT | Mod: HCNC

## 2025-02-25 PROCEDURE — 25000003 PHARM REV CODE 250: Mod: HCNC | Performed by: STUDENT IN AN ORGANIZED HEALTH CARE EDUCATION/TRAINING PROGRAM

## 2025-02-25 PROCEDURE — 94761 N-INVAS EAR/PLS OXIMETRY MLT: CPT | Mod: HCNC

## 2025-02-25 PROCEDURE — 99223 1ST HOSP IP/OBS HIGH 75: CPT | Mod: HCNC,,,

## 2025-02-25 PROCEDURE — 25000003 PHARM REV CODE 250: Mod: HCNC | Performed by: INTERNAL MEDICINE

## 2025-02-25 PROCEDURE — 99900035 HC TECH TIME PER 15 MIN (STAT): Mod: HCNC

## 2025-02-25 PROCEDURE — 25000003 PHARM REV CODE 250: Mod: HCNC | Performed by: EMERGENCY MEDICINE

## 2025-02-25 PROCEDURE — 80069 RENAL FUNCTION PANEL: CPT | Mod: HCNC | Performed by: INTERNAL MEDICINE

## 2025-02-25 PROCEDURE — 83540 ASSAY OF IRON: CPT | Mod: HCNC | Performed by: INTERNAL MEDICINE

## 2025-02-25 PROCEDURE — 97530 THERAPEUTIC ACTIVITIES: CPT | Mod: HCNC,CQ

## 2025-02-25 PROCEDURE — 63600175 PHARM REV CODE 636 W HCPCS: Mod: HCNC | Performed by: INTERNAL MEDICINE

## 2025-02-25 PROCEDURE — 36415 COLL VENOUS BLD VENIPUNCTURE: CPT | Mod: HCNC | Performed by: INTERNAL MEDICINE

## 2025-02-25 PROCEDURE — 82728 ASSAY OF FERRITIN: CPT | Mod: HCNC | Performed by: INTERNAL MEDICINE

## 2025-02-25 PROCEDURE — 63600175 PHARM REV CODE 636 W HCPCS: Mod: HCNC | Performed by: STUDENT IN AN ORGANIZED HEALTH CARE EDUCATION/TRAINING PROGRAM

## 2025-02-25 PROCEDURE — 25000003 PHARM REV CODE 250: Mod: HCNC | Performed by: HOSPITALIST

## 2025-02-25 PROCEDURE — 25000242 PHARM REV CODE 250 ALT 637 W/ HCPCS: Mod: HCNC | Performed by: INTERNAL MEDICINE

## 2025-02-25 PROCEDURE — 85025 COMPLETE CBC W/AUTO DIFF WBC: CPT | Mod: HCNC | Performed by: STUDENT IN AN ORGANIZED HEALTH CARE EDUCATION/TRAINING PROGRAM

## 2025-02-25 PROCEDURE — 99232 SBSQ HOSP IP/OBS MODERATE 35: CPT | Mod: HCNC,,, | Performed by: INTERNAL MEDICINE

## 2025-02-25 RX ORDER — LORAZEPAM 1 MG/1
1 TABLET ORAL DAILY PRN
Qty: 4 TABLET | Refills: 0 | Status: SHIPPED | OUTPATIENT
Start: 2025-02-25 | End: 2025-03-01

## 2025-02-25 RX ORDER — LORAZEPAM 1 MG/1
1 TABLET ORAL DAILY PRN
Qty: 4 TABLET | Refills: 0 | Status: SHIPPED | OUTPATIENT
Start: 2025-02-25 | End: 2025-02-25 | Stop reason: HOSPADM

## 2025-02-25 RX ORDER — LORAZEPAM 1 MG/1
1 TABLET ORAL EVERY 6 HOURS PRN
Qty: 4 TABLET | Refills: 0 | Status: SHIPPED | OUTPATIENT
Start: 2025-02-25 | End: 2025-02-25 | Stop reason: HOSPADM

## 2025-02-25 RX ORDER — MICONAZOLE NITRATE 2 G/100G
POWDER TOPICAL 2 TIMES DAILY
Status: DISCONTINUED | OUTPATIENT
Start: 2025-02-25 | End: 2025-02-25 | Stop reason: HOSPADM

## 2025-02-25 RX ADMIN — HYDRALAZINE HYDROCHLORIDE 100 MG: 25 TABLET ORAL at 05:02

## 2025-02-25 RX ADMIN — LIDOCAINE HYDROCHLORIDE 15 ML: 20 SOLUTION ORAL at 12:02

## 2025-02-25 RX ADMIN — MICONAZOLE NITRATE: 20 POWDER TOPICAL at 01:02

## 2025-02-25 RX ADMIN — PANTOPRAZOLE SODIUM 40 MG: 40 TABLET, DELAYED RELEASE ORAL at 08:02

## 2025-02-25 RX ADMIN — ALUMINUM HYDROXIDE, MAGNESIUM HYDROXIDE, AND SIMETHICONE 30 ML: 1200; 120; 1200 SUSPENSION ORAL at 12:02

## 2025-02-25 RX ADMIN — HYDRALAZINE HYDROCHLORIDE 100 MG: 25 TABLET ORAL at 01:02

## 2025-02-25 RX ADMIN — INSULIN ASPART 4 UNITS: 100 INJECTION, SOLUTION INTRAVENOUS; SUBCUTANEOUS at 11:02

## 2025-02-25 RX ADMIN — INSULIN ASPART 4 UNITS: 100 INJECTION, SOLUTION INTRAVENOUS; SUBCUTANEOUS at 04:02

## 2025-02-25 RX ADMIN — FLUOXETINE HYDROCHLORIDE 40 MG: 20 CAPSULE ORAL at 08:02

## 2025-02-25 RX ADMIN — ASPIRIN 81 MG: 81 TABLET, COATED ORAL at 08:02

## 2025-02-25 RX ADMIN — LORAZEPAM 1 MG: 0.5 TABLET ORAL at 01:02

## 2025-02-25 RX ADMIN — ISOSORBIDE MONONITRATE 60 MG: 30 TABLET, EXTENDED RELEASE ORAL at 08:02

## 2025-02-25 RX ADMIN — CEPHALEXIN 500 MG: 500 CAPSULE ORAL at 05:02

## 2025-02-25 RX ADMIN — METOPROLOL TARTRATE 25 MG: 25 TABLET, FILM COATED ORAL at 08:02

## 2025-02-25 RX ADMIN — INSULIN ASPART 2 UNITS: 100 INJECTION, SOLUTION INTRAVENOUS; SUBCUTANEOUS at 08:02

## 2025-02-25 RX ADMIN — LACTOBACILLUS ACIDOPHILUS / LACTOBACILLUS BULGARICUS 1 EACH: 100 MILLION CFU STRENGTH GRANULES at 08:02

## 2025-02-25 RX ADMIN — DICYCLOMINE HYDROCHLORIDE 10 MG: 10 INJECTION, SOLUTION INTRAMUSCULAR at 01:02

## 2025-02-25 RX ADMIN — ONDANSETRON 4 MG: 2 INJECTION INTRAMUSCULAR; INTRAVENOUS at 01:02

## 2025-02-25 RX ADMIN — HYDROXYZINE PAMOATE 50 MG: 25 CAPSULE ORAL at 05:02

## 2025-02-25 RX ADMIN — TICAGRELOR 60 MG: 60 TABLET ORAL at 08:02

## 2025-02-25 RX ADMIN — CEPHALEXIN 500 MG: 500 CAPSULE ORAL at 01:02

## 2025-02-25 NOTE — PLAN OF CARE
[facility]      HOME HEALTH ORDERS  FACE TO FACE ENCOUNTER    Patient Name: Lorena Contreras  YOB: 1950    PCP: Jorge Paris MD   PCP Address: [address] / [place] [address]  PCP Phone Number: [phone]  PCP Fax: [phone]    Encounter Date: 2/15/25    Admit to Home Health    Diagnoses:  Active Hospital Problems    Diagnosis  POA    *Complete heart block [I44.2]  Yes    Hyponatremia [E87.1]  Yes    Cardiac pacemaker in situ [Z95.0]  No     Medtronic dual pacemaker 2/17/25 for CHB      Acute kidney injury superimposed on stage 3b chronic kidney disease [N17.9, N18.32]  Yes    Diabetes mellitus [E11.9]  Yes    Aortic stenosis [I35.0]  Yes    Coronary artery disease of native artery of native heart with stable angina pectoris [I25.118]  Yes    Mixed hyperlipidemia [E78.2]  Yes    Anxiety [F41.9]  Yes     Chronic      Resolved Hospital Problems   No resolved problems to display.       Follow Up Appointments:  Future Appointments   Date Time Provider Department Center   3/5/2025  1:00 PM Heavenly Hogue FNP [facility] MED [place]   5/1/2025  9:00 AM [place] [facility]   5/9/2025 10:00 AM LAB, [facility] [facility] [place]   5/16/2025  9:00 AM Gerardo Barbour MD [facility] ENDOCRN [facility]       Allergies:  Review of patient's allergies indicates:   Allergen Reactions    Novolin 70/30 (semi-synthetic) Nausea And Vomiting     Severe vomiting on Relion 70/30    Sulfa (sulfonamide antibiotics) Anaphylaxis    Talwin [pentazocine lactate] Anaphylaxis    Victoza [liraglutide] Nausea And Vomiting    Glipizide Nausea Only    Codeine     Influenza virus vaccines Hives    Iodine and iodide containing products Hives    Levetiracetam Itching    Lyrica [pregabalin] Hallucinations    Neurontin [gabapentin]      Possible associated myoclonic jerk    Rituxan [rituximab] Hives    Zoloft [sertraline] Nausea And Vomiting       Medications: Review discharge medications with patient  and family and provide education.    Current Medications[1]     Medication List        START taking these medications      cephALEXin 500 MG capsule  Commonly known as: KEFLEX  Take 1 capsule (500 mg total) by mouth every 8 (eight) hours.     oxyCODONE 10 mg Tab immediate release tablet  Commonly known as: ROXICODONE  Take 1 tablet (10 mg total) by mouth every 12 (twelve) hours as needed for Pain.            CONTINUE taking these medications      albuterol 90 mcg/actuation inhaler  Commonly known as: PROVENTIL/VENTOLIN HFA  Inhale 2 puffs into the lungs every 6 (six) hours as needed for Wheezing or Shortness of Breath.     aluminum & magnesium hydroxide-simethicone 400-400-40 mg/5 mL suspension  Commonly known as: MYLANTA MAX STRENGTH  Take 30 mLs by mouth every 6 (six) hours as needed for Indigestion.     aspirin 81 MG Chew  Take 1 tablet (81 mg total) by mouth once daily. Hold to avoid bleed risk on triple therapy and then resume on oct 27 once eliquis stops on oct 26th     atorvastatin 80 MG tablet  Commonly known as: LIPITOR  Take 1 tablet (80 mg total) by mouth every evening.     DEXCOM G7  Misc  Generic drug: blood-glucose meter,continuous  Use 1  to track blood glucose, ICD10: E11.65     DEXCOM G7 SENSOR Samina  Generic drug: blood-glucose sensor  Use 1 sensor every 10 days to track blood glucose, ICD10: E11.65, okay with 90 day supply if possible     FLUoxetine 40 MG capsule  Take 1 capsule (40 mg total) by mouth once daily.     hydrALAZINE 100 MG tablet  Commonly known as: APRESOLINE  Take 1 tablet (100 mg total) by mouth every 8 (eight) hours.     * insulin aspart U-100 100 unit/mL (3 mL) Inpn pen  Commonly known as: NovoLOG  Inject 0-10 Units into the skin before meals and at bedtime as needed (Hyperglycemia).     * NovoLOG Flexpen U-100 Insulin 100 unit/mL (3 mL) Inpn pen  Generic drug: insulin aspart U-100  Inject 10 Units into the skin 3 (three) times daily with meals.     insulin  "glargine U-100 (Lantus) 100 unit/mL (3 mL) Inpn pen  Inject 10 Units into the skin every evening.     isosorbide mononitrate 60 MG 24 hr tablet  Commonly known as: IMDUR  Take 1 tablet (60 mg total) by mouth once daily.     LORazepam 1 MG tablet  Commonly known as: ATIVAN  TAKE 1 TABLET BY MOUTH ONCE DAILY AS NEEDED FOR ANXIETY     meclizine 12.5 mg tablet  Commonly known as: ANTIVERT  Take 1 tablet (12.5 mg total) by mouth 2 (two) times daily as needed for Dizziness.     ondansetron 8 MG Tbdl  Commonly known as: ZOFRAN-ODT  Dissolve 1 tablet (8 mg total) by mouth every 8 (eight) hours as needed (nausea).     pantoprazole 40 MG tablet  Commonly known as: PROTONIX  Take 1 tablet (40 mg total) by mouth once daily.     pen needle, diabetic 32 gauge x 5/32" Ndle  Commonly known as: BD ULTRA-FINE SAGAR PEN NEEDLE  One pen needle use with insulin pen 4 times a day.  ICD-10: E11.9     QUEtiapine 50 MG tablet  Commonly known as: SEROQUEL  Take 1 tablet (50 mg total) by mouth nightly as needed (insomnia).     ticagrelor 60 mg tablet  Commonly known as: BRILINTA  Take 1 tablet (60 mg total) by mouth 2 (two) times daily.     tobramycin-dexAMETHasone 0.3-0.1% 0.3-0.1 % Drps  Commonly known as: TOBRADEX  Place 1 drop into the right eye 4 (four) times daily.     triamcinolone acetonide 0.1% 0.1 % cream  Commonly known as: KENALOG  Apply topically 2 (two) times daily.           * This list has 2 medication(s) that are the same as other medications prescribed for you. Read the directions carefully, and ask your doctor or other care provider to review them with you.                STOP taking these medications      furosemide 40 MG tablet  Commonly known as: LASIX     metoprolol succinate 25 MG 24 hr tablet  Commonly known as: TOPROL-XL                I have seen and examined this patient within the last 30 days. My clinical findings that support the need for the home health skilled services and home bound status are the " following:no   Weakness/numbness causing balance and gait disturbance due to Weakness/Debility and ESRD making it taxing to leave home.     Diet:   cardiac diet, diabetic diet 2000 calorie, and renal diet    Labs:    Referrals/ Consults  Physical Therapy to evaluate and treat. Evaluate for home safety and equipment needs; Establish/upgrade home exercise program. Perform / instruct on therapeutic exercises, gait training, transfer training, and Range of Motion.  Occupational Therapy to evaluate and treat. Evaluate home environment for safety and equipment needs. Perform/Instruct on transfers, ADL training, ROM, and therapeutic exercises.    Activities:   activity as tolerated    Nursing:   Agency to admit patient within 24 hours of hospital discharge unless specified on physician order or at patient request    SN to complete comprehensive assessment including routine vital signs. Instruct on disease process and s/s of complications to report to MD. Review/verify medication list sent home with the patient at time of discharge  and instruct patient/caregiver as needed. Frequency may be adjusted depending on start of care date.     Skilled nurse to perform up to 3 visits PRN for symptoms related to diagnosis    Notify MD if SBP > 160 or < 90; DBP > 90 or < 50; HR > 120 or < 50; Temp > 101; O2 < 88%; Other:      Ok to schedule additional visits based on staff availability and patient request on consecutive days within the home health episode.    When multiple disciplines ordered:    Start of Care occurs on Sunday - Wednesday schedule remaining discipline evaluations as ordered on separate consecutive days following the start of care.    Thursday SOC -schedule subsequent evaluations Friday and Monday the following week.     Friday - Saturday SOC - schedule subsequent discipline evaluations on consecutive days starting Monday of the following week.    For all post-discharge communication and subsequent orders please  contact patient's primary care physician. If unable to reach primary care physician or do not receive response within 30 minutes, please contact CM for clinical staff order clarification    Miscellaneous       Home Health Aide:  Nursing Three times weekly, Physical Therapy Three times weekly, and Occupational Therapy Three times weekly    Wound Care Orders  no    I certify that this patient is confined to her home and needs intermittent skilled nursing care, physical therapy, and occupational therapy.               [1]   Current Facility-Administered Medications   Medication Dose Route Frequency Provider Last Rate Last Admin    acetaminophen tablet 650 mg  650 mg Oral Q4H PRN Karl Rico MD        albuterol-ipratropium 2.5 mg-0.5 mg/3 mL nebulizer solution 3 mL  3 mL Nebulization Q4H PRN López Soler MD        aspirin EC tablet 81 mg  81 mg Oral Daily Karl Rico MD   81 mg at 02/25/25 0822    atorvastatin tablet 80 mg  80 mg Oral QHS López Soler MD   80 mg at 02/24/25 2106    cephALEXin capsule 500 mg  500 mg Oral Q8H Barbour, Jania-My T., DO   500 mg at 02/25/25 1315    dextrose 50% injection 12.5 g  12.5 g Intravenous PRN Germaine Murray MD        dextrose 50% injection 25 g  25 g Intravenous PRN Germaine Murray MD        dicyclomine injection 10 mg  10 mg Intramuscular Q6H PRN Lexii Gonzalez MD   10 mg at 02/25/25 0145    FLUoxetine capsule 40 mg  40 mg Oral Daily López Soler MD   40 mg at 02/25/25 0822    glucagon (human recombinant) injection 1 mg  1 mg Intramuscular PRN Germaine Murray MD        glucose chewable tablet 16 g  16 g Oral PRN Germaine Murray MD   16 g at 02/20/25 2244    glucose chewable tablet 24 g  24 g Oral PRN Germaine Murray MD        hydrALAZINE injection 10 mg  10 mg Intravenous Q4H PRN Karl Rico MD   10 mg at 02/15/25 1036    hydrALAZINE tablet 100 mg  100 mg Oral Q8H López Soler MD   100 mg at 02/25/25 6664     HYDROcodone-acetaminophen 5-325 mg per tablet 1 tablet  1 tablet Oral Q4H PRN Karl Rico MD   1 tablet at 02/24/25 1402    HYDROmorphone (PF) injection 1 mg  1 mg Intravenous Q6H PRN Hany Alvarado MD   1 mg at 02/23/25 1713    hydrOXYzine pamoate capsule 50 mg  50 mg Oral Q8H PRN Singh Barbour T., DO   50 mg at 02/25/25 0528    insulin aspart U-100 pen 0-10 Units  0-10 Units Subcutaneous QID (AC + HS) PRN Germaine Murray MD   4 Units at 02/25/25 1137    isosorbide mononitrate 24 hr tablet 60 mg  60 mg Oral Daily López Soler MD   60 mg at 02/25/25 0822    lactobacillus acidophilus & bulgar 100 million cell packet 1 each  1 packet Oral BID Sherwin Rubio MD   1 each at 02/25/25 0822    LORazepam tablet 1 mg  1 mg Oral Daily PRN Hany Alvarado MD   1 mg at 02/25/25 0148    melatonin tablet 6 mg  6 mg Oral Nightly PRN López Soler MD   6 mg at 02/24/25 2346    metoprolol tartrate (LOPRESSOR) tablet 25 mg  25 mg Oral BID Karl Rico MD   25 mg at 02/25/25 0822    miconazole NITRATE 2 % top powder   Topical (Top) BID Lexii Gonzalez MD   Given at 02/25/25 0145    nitroGLYCERIN SL tablet 0.4 mg  0.4 mg Sublingual Q5 Min PRN Karl Rico MD        ondansetron disintegrating tablet 8 mg  8 mg Oral Q8H PRN Karl Rico MD   8 mg at 02/20/25 1335    ondansetron injection 4 mg  4 mg Intravenous Q4H PRN Singh Barbour T., DO   4 mg at 02/25/25 0152    oxyCODONE immediate release tablet 10 mg  10 mg Oral Q4H PRN Hany Alvarado MD   10 mg at 02/24/25 0148    pantoprazole EC tablet 40 mg  40 mg Oral Daily López Soler MD   40 mg at 02/25/25 0822    pneumoc 20-michael conj-dip cr(PF) (PREVNAR-20 (PF)) injection Syrg 0.5 mL  0.5 mL Intramuscular vaccine x 1 dose Germaine Murray MD        promethazine (PHENERGAN) 6.25 mg in 0.9% NaCl 50 mL IVPB  6.25 mg Intravenous Q6H PRN Singh Barbour, DO   Stopped at 02/21/25 1616    simethicone chewable tablet 80 mg  1 tablet  Oral TID PRN Germaine Murray MD   80 mg at 02/24/25 2106    sodium chloride 0.9% flush 10 mL  10 mL Intravenous PRN López Soler MD        sodium chloride 0.9% flush 10 mL  10 mL Intravenous PRN Germaine Murray MD        ticagrelor tablet 60 mg  60 mg Oral BID Karl Rico MD   60 mg at 02/25/25 0141

## 2025-02-25 NOTE — PLAN OF CARE
Case Management Final Discharge Note    Discharge Disposition: Resume home health services with [facility]    New DME ordered / company name: None    Relevant SDOH / Transition of Care Barriers:  None    Person available to provide assistance at home when needed and their contact information: Noelle- daughter [phone]    Scheduled followup appointment: PCP scheduled    Referrals placed: Cardiology- staff will contact pt to schedule hospital follow up.    Transportation: Family    I provided the patient a choice of post acute providers and offered a list of CMS rated : home health.    Patient/family chose the following as their preferred providers:  [facility]     Informed patient and family that placement is based on medical acceptance, insurance authorization and staff availability.      Pt has been scheduled with [facility] on Tuesday, Thursday and Saturday @ 9:45 am.    Patient and family educated on discharge services and updated on DC plan. Bedside RN notified, patient clear to discharge from Case Management Perspective.       02/25/25 1423   Final Note   Assessment Type Final Discharge Note   Anticipated Discharge Disposition Home-Health   What phone number can be called within the next 1-3 days to see how you are doing after discharge? [phone]   Hospital Resources/Appts/Education Provided Appointments scheduled and added to AVS   Post-Acute Status   Post-Acute Authorization Dialysis;Home Health   Home Health Status Set-up Complete/Auth obtained   Diaylsis Status Set-up Complete/Auth obtained   Coverage [employer] MANAGED MEDICARE - [employer] [facility] HMO PPO SPECIAL NEEDS   Patient choice form signed by patient/caregiver List from System Post-Acute Care   Discharge Delays None known at this time

## 2025-02-25 NOTE — DISCHARGE INSTRUCTIONS
DIALYSIS: [facility] on Tuesday, Thursday and Saturday @ 9:45 am.

## 2025-02-25 NOTE — NURSING
[facility] MEWS TRIGGER FOLLOW UP       MEWS Monitoring, Score is: 1  Indication for review: RN Concern- night shift RRN followed; pt with anxiety; re-assess; pt stable.     Pt seen at bedside  no concerns verbalized at this time, instructed to call [phone] for further concerns or assistance.

## 2025-02-25 NOTE — PT/OT/SLP PROGRESS
Occupational Therapy   Treatment    Name: Lorena Contreras  MRN: [id number]  Admitting Diagnosis:  Complete heart block  6 Days Post-Op    Recommendations:     Discharge Recommendations: Low Intensity Therapy  Discharge Equipment Recommendations:  none  Barriers to discharge:  None    Assessment:     Lorena Contreras is a 74 y.o. female with a medical diagnosis of Complete heart block.  She presents with deconditioning, dizziness in seated; orthostatics taken and while pt did not appear to have  orthostatic hypotension, pt with elevated BP overall reading as follows:  BP taken as follows:   BP OR MAP   Seated 197/81 77 116   Standing 183/78 79 112     Performance deficits affecting function are weakness, impaired endurance, impaired self care skills, impaired functional mobility, gait instability, impaired balance, impaired sensation, decreased upper extremity function, decreased lower extremity function, pain, impaired cardiopulmonary response to activity, edema, decreased ROM.     Rehab Prognosis:  Good; patient would benefit from acute skilled OT services to address these deficits and reach maximum level of function.       Plan:     Patient to be seen  (3-5x/week) to address the above listed problems via self-care/home management, therapeutic activities, therapeutic exercises  Plan of Care Expires: 03/18/25  Plan of Care Reviewed with: patient    Subjective     Chief Complaint: Pt reported dizziness seated EOB, tolerated transfer to bedside chair well but stated that she felt weak in standing  Patient/Family Comments/goals: to return to PLOF  Pain/Comfort:  Pain Rating 1:  (did not c/o pain)    Objective:     Communicated with: nsg prior to session.  Patient found HOB elevated with peripheral IV, PureWick, telemetry, oxygen upon OT entry to room.    General Precautions: Standard, fall    Orthopedic Precautions:N/A  Braces: N/A  Respiratory Status: Nasal cannula, flow 0.5 L/min     Occupational Performance:      Bed Mobility:    Patient completed Rolling/Turning to Left with  stand by assistance  Patient completed Scooting/Bridging with stand by assistance  Patient completed Supine to Sit with stand by assistance     Functional Mobility/Transfers:  Patient completed Sit <> Stand Transfer with contact guard assistance  with  rolling walker   Patient completed Bed <> Chair Transfer using Step Transfer technique with contact guard assistance with rolling walker  Functional Mobility: Pt with fair dynamic seated and standing balance.     Activities of Daily Living:  Upper Body Dressing: minimum assistance to don gown as robe seated EOB  Lower Body Dressing: moderate assistance and maximal assistance to don B slippers seated EOB; attempted to don but limited ability to accurately reach to small tag in back to pull over foot      AMPA 6 Click ADL: 15    Treatment & Education:  Pt educated on role of OT and POC.   Pt performing skills as listed above.     Patient left up in chair with all lines intact, call button in reach, and nsg notified    GOALS:   Multidisciplinary Problems       Occupational Therapy Goals          Problem: Occupational Therapy    Goal Priority Disciplines Outcome Interventions   Occupational Therapy Goal     OT, PT/OT Progressing    Description: Goals to be met by: 03/18/2025      Patient will increase functional independence with ADLs by performing:    UE Dressing with Modified Clements.  LE Dressing with Modified Clements.  Grooming while seated or standing with Modified Clements.  Toileting from toilet or BSC with Modified Clements for hygiene and clothing management.   Toilet transfer to toilet or BSC with Modified Clements.  Increased functional strength to WFL for self care.  Upper extremity exercise program x10 reps per handout, with assistance as needed.                          Time Tracking:     OT Date of Treatment: 02/25/25  OT Start Time: 1212  OT Stop Time: 1229  OT  Total Time (min): 17 min    Billable Minutes:Therapeutic Activity 17    OT/ERIKA: OT     Number of ERIKA visits since last OT visit: 0    2/25/2025

## 2025-02-25 NOTE — PLAN OF CARE
Problem: Adult Inpatient Plan of Care  Goal: Absence of Hospital-Acquired Illness or Injury  Outcome: Progressing  Intervention: Identify and Manage Fall Risk  Flowsheets (Taken 2/25/2025 0614)  Safety Promotion/Fall Prevention:   assistive device/personal item within reach   lighting adjusted   medications reviewed   bed alarm set   side rails raised x 2  Intervention: Prevent Skin Injury  Flowsheets (Taken 2/25/2025 0614)  Body Position: turned  Skin Protection:   silicone foam dressing in place   skin sealant/moisture barrier applied  Device Skin Pressure Protection: tubing/devices free from skin contact

## 2025-02-25 NOTE — PLAN OF CARE
02/25/25 1101   Medicare Message   Important Message from Medicare regarding Discharge Appeal Rights Given to patient/caregiver;Explained to patient/caregiver;Signed/date by patient/caregiver   Date IMM was signed 02/25/25   Time IMM was signed 1015

## 2025-02-25 NOTE — PLAN OF CARE
Problem: Adult Inpatient Plan of Care  Goal: Plan of Care Review  Outcome: Adequate for Care Transition  Goal: Patient-Specific Goal (Individualized)  Outcome: Adequate for Care Transition  Goal: Absence of Hospital-Acquired Illness or Injury  Outcome: Adequate for Care Transition  Goal: Optimal Comfort and Wellbeing  Outcome: Adequate for Care Transition  Goal: Readiness for Transition of Care  Outcome: Adequate for Care Transition     Problem: Diabetes Comorbidity  Goal: Blood Glucose Level Within Targeted Range  Outcome: Adequate for Care Transition     Problem: Acute Kidney Injury/Impairment  Goal: Fluid and Electrolyte Balance  Outcome: Adequate for Care Transition  Goal: Improved Oral Intake  Outcome: Adequate for Care Transition  Goal: Effective Renal Function  Outcome: Adequate for Care Transition     Problem: Fall Injury Risk  Goal: Absence of Fall and Fall-Related Injury  Outcome: Adequate for Care Transition     Problem: Infection  Goal: Absence of Infection Signs and Symptoms  Outcome: Adequate for Care Transition     Problem: Skin Injury Risk Increased  Goal: Skin Health and Integrity  Outcome: Adequate for Care Transition     Problem: Hemodialysis  Goal: Safe, Effective Therapy Delivery  Outcome: Adequate for Care Transition  Goal: Effective Tissue Perfusion  Outcome: Adequate for Care Transition  Goal: Absence of Infection Signs and Symptoms  Outcome: Adequate for Care Transition

## 2025-02-25 NOTE — CARE UPDATE
RAPID RESPONSE NURSE PROACTIVE ROUNDING NOTE       Time of Visit: 0121    Admit Date: 2/15/2025  LOS: 10  Code Status: Full Code   Date of Visit: 2025  : 1950  Age: 74 y.o.  Sex: female  Race: White  Bed: W418/W418 A:   MRN: [id number]  Was the patient discharged from an ICU this admission? Yes   Was the patient discharged from a PACU within last 24 hours? No   Did the patient receive conscious sedation/general anesthesia in last 24 hours? No   Was the patient in the ED within the past 24 hours? No   Was the patient on NIPPV within the past 24 hours? No   Attending Physician: Singh Barbour DO  Primary Service: Hospitalist   Time spent at the bedside: < 15 min    SITUATION    Notified by  Patient call from the call light while rounding  Reason for alert: Abdominal Pain and Shortness of breath    Diagnosis: Complete heart block   has a past medical history of Age-related osteoporosis with current pathological fracture with routine healing, Allergy, Altered mental status, Anemia, Anxiety, Arthritis, Cataract, Colon polyps, Coronary artery disease, Depression, Diabetes mellitus, type II, Disorder of kidney and ureter, Epilepsia partialis continua, Fibromyalgia, Follicular lymphoma, GERD (gastroesophageal reflux disease), HTN (hypertension), Hyperlipidemia, MI (myocardial infarction), Personal history of colonic polyps, Restless leg syndrome, and Stroke.    Last Vitals:  Temp: 98.2 °F (36.8 °C) (2350)  Pulse: 77 (0)  Resp: 18 (2350)  BP: 144/66 (2350)  SpO2: 98 % ( 010)    24 Hour Vitals Range:  Temp:  [97.8 °F (36.6 °C)-98.4 °F (36.9 °C)]   Pulse:  [67-78]   Resp:  [16-18]   BP: (129-165)/(54-79)   SpO2:  [94 %-100 %]     Clinical Issues:  Abdominal pain, Short of breath    ASSESSMENT/INTERVENTIONS  While rounding patient called through call bell and report having abdominal pain. And shortness of breath. Patient appears anxious and very worried that she feels like  fainting. On assessment Saturation is 98% on room air with HR of 77. Patient did received GI cocktail at 00:08    RECOMMENDATIONS  Give PRN Dicyclomine for abdominal pain and Ativan po prn for anxiety    Discussed plan of care with bedside RNKiki    PROVIDER ESCALATION    Physician escalation: No    Orders received and case discussed with NA.    Disposition:Remain in room 418    FOLLOW UP    Call back the Rapid Response Nurse Pat at [phone] for additional questions or concerns.

## 2025-02-25 NOTE — CONSULTS
[facility] Surg  Wound Care  [facility]    Patient Name:  Lorena Contreras   MRN:  [id number]  Date: 2/25/2025  Diagnosis: Complete heart block    History:     Past Medical History:   Diagnosis Date    Age-related osteoporosis with current pathological fracture with routine healing 11/13/2024    Allergy     Altered mental status 06/19/2022    DYSARTHRIA, SPASTIC MOVEMENTS & DIFFICULTY SWALLOWING    Anemia     Anxiety     Arthritis     Cataract     both removed    Colon polyps     Coronary artery disease     Depression     Diabetes mellitus, type II     Disorder of kidney and ureter     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/2023    Fibromyalgia     Follicular lymphoma     GERD (gastroesophageal reflux disease)     HTN (hypertension)     Hyperlipidemia     MI (myocardial infarction) 03/2019    Personal history of colonic polyps     Restless leg syndrome     Stroke        Social History[1]    Precautions:     Allergies as of 02/15/2025 - Reviewed 02/15/2025   Allergen Reaction Noted    Novolin 70/30 (semi-synthetic) Nausea And Vomiting 07/18/2016    Sulfa (sulfonamide antibiotics) Anaphylaxis 01/23/2014    Talwin [pentazocine lactate] Anaphylaxis 01/23/2014    Victoza [liraglutide] Nausea And Vomiting 07/30/2015    Glipizide Nausea Only 11/04/2016    Codeine  01/23/2014    Influenza virus vaccines Hives 06/01/2021    Iodine and iodide containing products Hives 10/25/2017    Levetiracetam Itching 04/28/2023    Lyrica [pregabalin] Hallucinations 12/30/2024    Neurontin [gabapentin]  08/20/2024    Rituxan [rituximab] Hives 01/23/2014    Zoloft [sertraline] Nausea And Vomiting 01/23/2014       WO Assessment Details/Treatment     Active Problem List with Overview Notes    Diagnosis Date Noted    Hyponatremia 02/18/2025    Cardiac pacemaker in situ 02/17/2025     Medtronic dual pacemaker 2/17/25 for CHB      Acute kidney injury superimposed on stage 3b chronic kidney disease 02/16/2025    CHB (complete heart block)  02/15/2025    Complete heart block 02/15/2025    Diabetes mellitus 02/15/2025    Impaired gait and mobility 01/27/2025    Age-related osteoporosis with current pathological fracture with routine healing 11/13/2024    Rectal bleeding 10/30/2024    Anemia 10/30/2024    Elevated troponin level not due myocardial infarction 10/30/2024    Coronary artery disease 10/30/2024    Neck muscle spasm 10/01/2024    C. difficile colitis 09/29/2024     Currently treated with po vancomycin      Hypokalemia 09/28/2024    Wound dehiscence, right ankle 09/24/2024    Hypoalbuminemia 08/17/2024    Tremor 08/15/2024    Closed fracture of right iliac wing 08/14/2024    Multiple fractures of pelvis 08/14/2024    Closed displaced fracture of right acetabulum 08/14/2024    Closed right pilon fracture 08/14/2024    Closed fracture of superior and inferior rami of right pubis 08/14/2024    Acute blood loss anemia 08/14/2024    Chronic bronchitis, unspecified chronic bronchitis type 05/28/2024    Stage 3b chronic kidney disease 05/28/2024    Aortic stenosis 11/06/2023    Post herpetic neuralgia 11/03/2023    Type 2 diabetes mellitus with stage 3b chronic kidney disease, with long-term current use of insulin 08/24/2023    KYA (acute kidney injury) 04/10/2023    Status post tooth extraction 06/19/2022    Proliferative diabetic retinopathy of left eye associated with type 2 diabetes mellitus, unspecified proliferative retinopathy type 02/11/2022    Aortic atherosclerosis 08/10/2021     Noted on chest x-ray 06/04/2021:  calcification and slight tortuosity of the aorta       Osteopenia of neck of femur 08/10/2021     Noted on DEXA 12/08/2016:   Low bone mass/osteopenia of the femoral neck       History of TIA (transient ischemic attack) 07/21/2021     20 years ago with numbness.  No symptoms now      RLS (restless legs syndrome) 07/21/2021    Iron deficiency anemia 06/01/2021    Peripheral vascular disease in diabetes mellitus -- CLEMENT 05/2019  11/07/2020     Noted on CLEMENT 05/06/2019:  Hemodynamically significant greater than 70% lesion in the L SFA proximally  Moderate greater than 50% plaque noted in the right SFA  Noncompressible CLEMENT bilaterally         Pulmonary hypertension -- echo 11/2019 02/06/2020     Noted on echo 11/18/2019:  The estimated PA systolic pressure is 50 mm Hg.  Pulmonary hypertension present.          Coronary artery disease of native artery of native heart with stable angina pectoris 03/29/2019    Mild nonproliferative diabetic retinopathy of both eyes associated with type 2 diabetes mellitus 03/07/2017    Mixed hyperlipidemia 07/30/2015    Follicular lymphoma 08/26/2014       follicular lymphoma diagnosed in late 2009. She was treated with chemotherapy (withour Rituximab due to allergy, unclear regimen) and apparently achieved a CR. She was then lost to follow up die to lack of insurance until 6/26/13 when she re-establish care in our clinic. Surveillance CTs from 7/9/13 are significant for unspecific some tissue prominence in the ventral tongue that is unspecific, unspecific pulmonary nodules and non bulky LAD in the internal jugular chain measuring up to 14 mm. These finding are unspecific and not indicative of follicular lymphoma recurrence.        Anxiety 01/24/2014    Primary hypertension 01/24/2014      Nursing consult for altered skin integrity sacrum  A 74 year old female admitted 2/15/25 from home with complaint of chest pain.   2/25 WBC 9.79 Hgb 8.2 Hct 26.9 Alb 2.5 Weight 85.9 kg   1/14 A1C 5.9  On Isoflex mattress; Rico score 16   2/15 9:00 am 4 Eyes Skin Assessment- No Pressure Injuries Present  2/25 1:20 am 4 Eyes Skin Assessment- New Pressure Injury discovered- Stage 2 sacrum  Assessment:  Photodocumentation    Sacrum- DTPI involving area 5 cm(L) 6 mm(W). Maroon in color with peeling epidermis. Painful to touch. Reports being in bed for 8 days. Prior to admit, was ambulating independently.   Patient turning  independently. Mepilex dressing in place.   Treatment/Plan:  Local wound care with Venelex( if not discharged home) Allow area of DTPI evolve. If discharged home, suggest hydrocolloid dressing.   Instructed patient to turn side to side and avoid positioning on sacrum. When up in chair, place on chair cushion.(Will give a chair cushion for discharge home and instruct on use). Use chair cushion in dialysis.   Recommendations made to primary team. Orders placed.     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  2 dtr.  Lives with .  3 cats and a dog.  Retired.  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  Lives in house, 1 story and 4 steps up and has a ramp.      Enjoys crafting.  Unable to bowl due to myalgias.       Social Drivers of Health     Financial Resource Strain: Low Risk  (2/16/2025)    Overall Financial Resource Strain (CARDIA)     Difficulty of Paying Living Expenses: Not very hard   Food Insecurity: No Food Insecurity (2/16/2025)    Hunger Vital Sign     Worried About Running Out of Food in the Last Year: Never true     Ran Out of Food in the Last Year: Never true   Transportation Needs: No Transportation Needs (10/31/2024)    TRANSPORTATION NEEDS     Transportation : No   Physical Activity: Sufficiently Active (2/16/2025)    Exercise Vital Sign     Days of Exercise per Week: 7 days     Minutes of Exercise per Session: 30 min   Stress: Stress Concern Present (2/16/2025)    [employer] [employer] - Occupational Stress Questionnaire     Feeling of Stress : To some extent   Housing  Stability: Low Risk  (2/16/2025)    Housing Stability Vital Sign     Unable to Pay for Housing in the Last Year: No     Homeless in the Last Year: No

## 2025-02-25 NOTE — DISCHARGE SUMMARY
[facility] Medicine  Discharge Summary      Patient Name: Lorena Contreras  MRN: [id number]  ANDREWS: [id number]  Patient Class: IP- Inpatient  Admission Date: 2/15/2025  Hospital Length of Stay: 10 days  Discharge Date and Time:  02/25/2025 2:17 PM  Attending Physician: Germaine Murray MD   Discharging Provider: Germaine Murray MD  Primary Care Provider: Jorge Paris MD    Primary Care Team: Networked reference to record PCT     HPI:   A pleasant  72 yo F with PMHx of  DM2, Fibromyalgia, lymphoma s/p chemo, HTN, HLD, CVA, MI, CAD s/p PCIs  CKD3b, HFpEF, and anemia who presented to the ED with a chief complaint of chest pain radiating to the back with onset a day before presentation.    Pain was said to be sudden in onset; no associated nausea or diaphoresis.  No Preceding PND orthopnea or leg swelling; pain was described as different from her previous coronary events.  Patient however endorsed dizziness but this has been going on for some time.  She denied any syncopal event  Symptoms progress and this prompted patient to come to the ED    Retrospective chart review indicates multiple ED visits for observation for atypical chest pain.  Cardiac history is significant for CAD with JESIKA x 2 to RCA 3/29/19 - JESIAK to RI and Cx 1/8/20      On presentation to the ED; patient was bradycardic to 32 BPM which subsequently improved and mildly hypertensive.  EKG showed complete heart block.  Patient was admitted to the ICU for further management.    Procedure(s) (LRB):  Left heart cath (Left)      Hospital Course:   73-year-old female with history of diabetes, anxiety,HTN, HLD, CVA, MI, CAD s/p PCIs  CKD3b, HFpEF, and anemia admitted to ICU on 02/15/2025 for complete heart block and KYA.  Cardiology consulted- patient s/p ppm on 02/17.  Patient to start aspirin and Brilinta on 02/18.   Patient with chest pain and tachycardia overnight on 02/18. Found to have elevated troponin 1.9 and peaked at 3.3.   EKG noted and reviewed with NO STEMI and unchanged from previous on 2/17. Device interrogated - normal function. No PMT Discussed with cardiology. S/p heart catheterization on 02/19, [facility] indicated triple-vessel disease with patent stents and moderate LAD/RCA disease - no culprit identified . Continue medical Rx per cardiology. Nephrology consulted for KYA on CKD. Pt with severe nausea and vomiting, BUN >100. THDC placed on 02/21, and 1st HD session started on 02/21, 2nd session on 02/22 and plan for 3rd session on 02/24.  Patient was discharged home with home health to follow up with Cardiology, Nephrology and PCP.  Patient expressed concern about her heart rate which she attributed her anxiety; patient was counseled.  NC oxygen was weaned off before discharge.  Patient was instructed to come back to the hospital in the event of shortness of breaths, chest pain, or fever.  All questions were answered to her satisfaction and she verbalized understanding.     Goals of Care Treatment Preferences:  Code Status: Full Code      SDOH Screening:  The patient was screened for utility difficulties, food insecurity, transport difficulties, housing insecurity, and interpersonal safety and there were no concerns identified this admission.     Consults:   Consults (From admission, onward)          Status Ordering Provider     Inpatient consult to Interventional Radiology  Once        Provider:  Liv Miller MD    Completed LOGAN AYON-EDMOND TREJO.     Inpatient consult to Cardiology  Once        Provider:  Karl Rico MD    Completed JENNIFER SANCHEZ     Inpatient consult to Social Work/Case Management  Once        Provider:  (Not yet assigned)    Completed ORDERS, INSTANT     Cardiology  Once        Provider:  Karl Rico MD    Completed RICK ROGERS     Nephrology  Once        Provider:  Marifer Perry MD    Completed RICK ROGERS.            * Complete heart block  -Hx of chest pain on presentation;  different from previous anginal pains  -EKG showed complete heart block  -Discontinue all AV frances blocking agents  -Monitor patient on telemetry  -S/p [employer] PPM on 02/17  -Continue Keflex; arm sling per Cardiology  -Restart aspirin and Brilinta 02/18/25  -Patient with chest pain and tachycardia on 02/19. Troponin peaked at 3  -Discussed with cardiology, S/p heart catheterization on 02/19, [facility] with patent stents and moderate LAD/RCA disease - no culprit identified   -Change antibiotics to ceftriaxone to cover UTI, urine culture with Klebsiella oxytoca  -completed treatment for UTI on 02/23/2025  -resume Keflex for prophylaxis antibiotics (3 days) per Cardiology      Hyponatremia  Hyponatremia is likely due to renal insufficiency. The patient's most recent sodium results are listed below.  Recent Labs     02/22/25  0447 02/23/25  0507 02/24/25  0437   * 137 135*       Plan  - nephrology consulted  - Monitor sodium Daily.   - Patient hyponatremia is stable  - monitor closely    Cardiac pacemaker in situ  -s/p ppm on 02/17.  -resume Keflex on 02/24    Acute kidney injury superimposed on stage 3b chronic kidney disease  KYA is likely due to pre-renal azotemia due to intravascular volume depletion. Baseline creatinine is  1.6 to 2.2 . Most recent creatinine and eGFR are listed below.  Recent Labs     02/22/25  0447 02/23/25  0507 02/24/25  0437   CREATININE 3.0* 2.1* 2.5*   EGFRNORACEVR 16* 24* 20*        Plan  - KYA is stable  - Avoid nephrotoxins and renally dose meds for GFR listed above  - Monitor urine output, serial BMP, and adjust therapy as needed  - nephrology consulted: Initiated 1st session of dialysis on 02/21, completed 2nd dialysis on 02/22  -plan for 3rd session today on 02/24  - monitor closely     Diabetes mellitus  Patient's FSGs are controlled on current medication regimen.  Last A1c reviewed-   Lab Results   Component Value Date    HGBA1C 5.9 (H) 01/14/2025     Most recent fingerstick  glucose reviewed-   Recent Labs   Lab 02/23/25  1142 02/23/25  1616 02/23/25  1935 02/24/25  0717   POCTGLUCOSE 106 118* 99 160*       Current correctional scale  Medium  Maintain anti-hyperglycemic dose as follows-   Antihyperglycemics (From admission, onward)      Start     Stop Route Frequency Ordered    02/15/25 1358  insulin aspart U-100 pen 0-10 Units         -- SubQ Before meals & nightly PRN 02/15/25 1258          Hold Oral hypoglycemics while patient is in the hospital.; cardiac consistent carbohydrate diet; monitor blood glucose log and titrate to effect  Prandial aspart added    Stage 3b chronic kidney disease  Creatine stable for now. BMP reviewed- noted Estimated Creatinine Clearance: 15.2 mL/min (A) (based on SCr of 3.8 mg/dL (H)). according to latest data. Based on current GFR, CKD stage is stage 4 - GFR 15-29.  Monitor UOP and serial BMP and adjust therapy as needed. Renally dose meds. Avoid nephrotoxic medications and procedures.    Aortic stenosis  Echocardiogram with evidence of aortic stenosis that is moderate . The patient's most recent echocardiogram result is listed below. We will manage the valvular abnormality by avoiding volume overload; and cardiology follow up as an outpatient    Echo Saline Bubble? No  Result Date: 2/19/2025    Left Ventricle: The left ventricle is normal in size. There is mild   concentric hypertrophy. Septal motion is consistent with pacing. There is   normal systolic function with a visually estimated ejection fraction of 60   - 65%. Grade II diastolic dysfunction.    Right Ventricle: Mild right ventricular enlargement. Systolic function   is normal.    Left Atrium: Left atrium is moderately dilated.    Right Atrium: Right atrium is mildly dilated.    Aortic Valve: There is moderate stenosis. Aortic valve area by VTI is   1.0 cm². Aortic valve peak velocity is 3.2 m/s. Mean gradient is 24 mmHg.   The dimensionless index is 0.33.    Mitral Valve: There is mild  stenosis. The mean pressure gradient across   the mitral valve is 12 mmHg at a heart rate of  bpm. There is mild   regurgitation.    Tricuspid Valve: There is moderate regurgitation.    Pulmonary Artery: The estimated pulmonary artery systolic pressure is   72 mmHg.    IVC/SVC: Intermediate venous pressure at 8 mmHg.    Pericardium: There is a trivial posterior effusion.        Echo  Result Date: 2/15/2025    Left Ventricle: The left ventricle is normal in size. There is moderate   concentric hypertrophy. There is hyperdynamic systolic function with a   visually estimated ejection fraction of 70 - 75%.    Right Ventricle: Normal right ventricular cavity size. Systolic   function is normal.    Left Atrium: Left atrium is moderately dilated.    Right Atrium: Right atrium is mildly dilated.    Aortic Valve: There is moderate stenosis. Aortic valve area by VTI is   0.8 cm². Aortic valve peak velocity is 3.4 m/s. Mean gradient is 27 mmHg.   The dimensionless index is 0.27.    Mitral Valve: There is mild stenosis. The mean pressure gradient across   the mitral valve is 5 mmHg at a heart rate of 42  bpm. There is mild   regurgitation.    Tricuspid Valve: There is moderate regurgitation.    Pulmonary Artery: The estimated pulmonary artery systolic pressure is   49 mmHg.    IVC/SVC: Intermediate venous pressure at 8 mmHg.        Echo  Result Date: 10/31/2024    Left Ventricle: The left ventricle is normal in size. Normal wall   thickness. Normal wall motion. There is normal systolic function with a   visually estimated ejection fraction of 60 - 65%. Grade II diastolic   dysfunction. Elevated left ventricular filling pressure.    Right Ventricle: Normal right ventricular cavity size. Wall thickness   is normal. Systolic function is normal.    Left Atrium: Left atrium is severely dilated.    Aortic Valve: There is moderate aortic valve sclerosis. Moderately   restricted motion. There is moderate stenosis. Aortic valve area by  VTI is   1.1 cm2. Aortic valve peak velocity is 3.1 m/s. Mean gradient is 20.1   mmHg. The dimensionless index is 0.37.    Mitral Valve: There is mild regurgitation.    Tricuspid Valve: There is mild regurgitation.    Pulmonary Artery: There is pulmonary hypertension. The estimated   pulmonary artery systolic pressure is 46 mmHg.    IVC/SVC: Normal venous pressure at 3 mmHg.           Coronary artery disease of native artery of native heart with stable angina pectoris  Patient with known CAD s/p stent placement and CABG, which is controlled Will continue Statin; hold aspirin and Plavix setting of impending pacing and monitor for S/Sx of angina/ACS. Continue to monitor on telemetry.     -repeat ischemic evaluation of after PPM; defer timing to cardiology  -cardiology follow-up outpatient  -patient with chest pain and elevated troponin of 3 on 02/19   -S/p heart catheterization on 02/19, [facility] with patent stents and moderate LAD/RCA disease - no culprit identified     Mixed hyperlipidemia  Continue home statin      Anxiety  -hydroxyzine p.r.n. every 8 hours for anxiety        Final Active Diagnoses:    Diagnosis Date Noted POA    PRINCIPAL PROBLEM:  Complete heart block [I44.2] 02/15/2025 Yes    Hyponatremia [E87.1] 02/18/2025 Yes    Cardiac pacemaker in situ [Z95.0] 02/17/2025 No    Acute kidney injury superimposed on stage 3b chronic kidney disease [N17.9, N18.32] 02/16/2025 Yes    Diabetes mellitus [E11.9] 02/15/2025 Yes    Aortic stenosis [I35.0] 11/06/2023 Yes    Coronary artery disease of native artery of native heart with stable angina pectoris [I25.118] 03/29/2019 Yes    Mixed hyperlipidemia [E78.2] 07/30/2015 Yes    Anxiety [F41.9] 01/24/2014 Yes     Chronic      Problems Resolved During this Admission:       Discharged Condition: stable    Disposition: Home or Self Care    Follow Up:   Follow-up Information       Karl Rico MD Follow up on 2/28/2025.    Specialty: Cardiology  Why: Dr. Rico' office  will contact you with the date and time of your appointment.  You may also check the Portal to determine if the office has scheduled your appointment.  Contact information:  [address] [address] [phone]               [facility]. Go to.    Why: Dialysis on Tuesday, Thursday and Saturday @9:45 am.  Contact information:  [id number]  [place] [address]                         Patient Instructions:      Ambulatory referral/consult to Cardiology   Standing Status: Future   Referral Priority: Routine Referral Type: Consultation   Referral Reason: Specialty Services Required   Requested Specialty: Cardiology   Number of Visits Requested: 1     Ambulatory referral/consult to Family Practice   Standing Status: Future   Referral Priority: Routine Referral Type: Consultation   Referral Reason: Specialty Services Required   Requested Specialty: Family Medicine   Number of Visits Requested: 1     Diet diabetic     Diet Cardiac     Diet renal     Notify your health care provider if you experience any of the following:  temperature >100.4     Notify your health care provider if you experience any of the following:  difficulty breathing or increased cough     Notify your health care provider if you experience any of the following:  increased confusion or weakness     Activity as tolerated       Significant Diagnostic Studies: N/A    Pending Diagnostic Studies:       None           Medications:  Reconciled Home Medications:      Medication List        START taking these medications      cephALEXin 500 MG capsule  Commonly known as: KEFLEX  Take 1 capsule (500 mg total) by mouth every 8 (eight) hours.     oxyCODONE 10 mg Tab immediate release tablet  Commonly known as: ROXICODONE  Take 1 tablet (10 mg total) by mouth every 12 (twelve) hours as needed for Pain.            CONTINUE taking these medications      albuterol 90 mcg/actuation inhaler  Commonly known as:  PROVENTIL/VENTOLIN HFA  Inhale 2 puffs into the lungs every 6 (six) hours as needed for Wheezing or Shortness of Breath.     aluminum & magnesium hydroxide-simethicone 400-400-40 mg/5 mL suspension  Commonly known as: MYLANTA MAX STRENGTH  Take 30 mLs by mouth every 6 (six) hours as needed for Indigestion.     aspirin 81 MG Chew  Take 1 tablet (81 mg total) by mouth once daily. Hold to avoid bleed risk on triple therapy and then resume on oct 27 once eliquis stops on oct 26th     atorvastatin 80 MG tablet  Commonly known as: LIPITOR  Take 1 tablet (80 mg total) by mouth every evening.     DEXCOM G7  Misc  Generic drug: blood-glucose meter,continuous  Use 1  to track blood glucose, ICD10: E11.65     DEXCOM G7 SENSOR Samina  Generic drug: blood-glucose sensor  Use 1 sensor every 10 days to track blood glucose, ICD10: E11.65, okay with 90 day supply if possible     FLUoxetine 40 MG capsule  Take 1 capsule (40 mg total) by mouth once daily.     hydrALAZINE 100 MG tablet  Commonly known as: APRESOLINE  Take 1 tablet (100 mg total) by mouth every 8 (eight) hours.     * insulin aspart U-100 100 unit/mL (3 mL) Inpn pen  Commonly known as: NovoLOG  Inject 0-10 Units into the skin before meals and at bedtime as needed (Hyperglycemia).     * NovoLOG Flexpen U-100 Insulin 100 unit/mL (3 mL) Inpn pen  Generic drug: insulin aspart U-100  Inject 10 Units into the skin 3 (three) times daily with meals.     insulin glargine U-100 (Lantus) 100 unit/mL (3 mL) Inpn pen  Inject 10 Units into the skin every evening.     isosorbide mononitrate 60 MG 24 hr tablet  Commonly known as: IMDUR  Take 1 tablet (60 mg total) by mouth once daily.     LORazepam 1 MG tablet  Commonly known as: ATIVAN  TAKE 1 TABLET BY MOUTH ONCE DAILY AS NEEDED FOR ANXIETY     meclizine 12.5 mg tablet  Commonly known as: ANTIVERT  Take 1 tablet (12.5 mg total) by mouth 2 (two) times daily as needed for Dizziness.     ondansetron 8 MG Tbdl  Commonly  "known as: ZOFRAN-ODT  Dissolve 1 tablet (8 mg total) by mouth every 8 (eight) hours as needed (nausea).     pantoprazole 40 MG tablet  Commonly known as: PROTONIX  Take 1 tablet (40 mg total) by mouth once daily.     pen needle, diabetic 32 gauge x 5/32" Ndle  Commonly known as: BD ULTRA-FINE SAGAR PEN NEEDLE  One pen needle use with insulin pen 4 times a day.  ICD-10: E11.9     QUEtiapine 50 MG tablet  Commonly known as: SEROQUEL  Take 1 tablet (50 mg total) by mouth nightly as needed (insomnia).     ticagrelor 60 mg tablet  Commonly known as: BRILINTA  Take 1 tablet (60 mg total) by mouth 2 (two) times daily.     tobramycin-dexAMETHasone 0.3-0.1% 0.3-0.1 % Drps  Commonly known as: TOBRADEX  Place 1 drop into the right eye 4 (four) times daily.     triamcinolone acetonide 0.1% 0.1 % cream  Commonly known as: KENALOG  Apply topically 2 (two) times daily.           * This list has 2 medication(s) that are the same as other medications prescribed for you. Read the directions carefully, and ask your doctor or other care provider to review them with you.                STOP taking these medications      furosemide 40 MG tablet  Commonly known as: LASIX     metoprolol succinate 25 MG 24 hr tablet  Commonly known as: TOPROL-XL              Indwelling Lines/Drains at time of discharge:   Lines/Drains/Airways       Central Venous Catheter Line  Duration                  Hemodialysis Catheter 02/21/25 1425 right internal jugular 3 days              Drain  Duration             Female External Urinary Catheter w/ Suction 02/15/25 0011 10 days                    Time spent on the discharge of patient: more than 35 minutes         Germaine Murray MD  Department of Hospital Medicine  [facility]  "

## 2025-02-25 NOTE — SUBJECTIVE & OBJECTIVE
Interval History: s/p HD yesterday with 0.8L removed. No events overnight. Doing okay this morning. Reports anxiety which is worse at night. +UOP.   Outpatient dialysis chair secured at [facility] under my care.       Review of patient's allergies indicates:   Allergen Reactions    Novolin 70/30 (semi-synthetic) Nausea And Vomiting     Severe vomiting on Relion 70/30    Sulfa (sulfonamide antibiotics) Anaphylaxis    Talwin [pentazocine lactate] Anaphylaxis    Victoza [liraglutide] Nausea And Vomiting    Glipizide Nausea Only    Codeine     Influenza virus vaccines Hives    Iodine and iodide containing products Hives    Levetiracetam Itching    Lyrica [pregabalin] Hallucinations    Neurontin [gabapentin]      Possible associated myoclonic jerk    Rituxan [rituximab] Hives    Zoloft [sertraline] Nausea And Vomiting     Current Facility-Administered Medications   Medication Frequency    acetaminophen tablet 650 mg Q4H PRN    albuterol-ipratropium 2.5 mg-0.5 mg/3 mL nebulizer solution 3 mL Q4H PRN    aspirin EC tablet 81 mg Daily    atorvastatin tablet 80 mg QHS    cephALEXin capsule 500 mg Q8H    dextrose 50% injection 12.5 g PRN    dextrose 50% injection 25 g PRN    dicyclomine injection 10 mg Q6H PRN    FLUoxetine capsule 40 mg Daily    glucagon (human recombinant) injection 1 mg PRN    glucose chewable tablet 16 g PRN    glucose chewable tablet 24 g PRN    hydrALAZINE injection 10 mg Q4H PRN    hydrALAZINE tablet 100 mg Q8H    HYDROcodone-acetaminophen 5-325 mg per tablet 1 tablet Q4H PRN    HYDROmorphone (PF) injection 1 mg Q6H PRN    hydrOXYzine pamoate capsule 50 mg Q8H PRN    insulin aspart U-100 pen 0-10 Units QID (AC + HS) PRN    isosorbide mononitrate 24 hr tablet 60 mg Daily    lactobacillus acidophilus & bulgar 100 million cell packet 1 each BID    LORazepam tablet 1 mg Daily PRN    melatonin tablet 6 mg Nightly PRN    metoprolol tartrate (LOPRESSOR) tablet 25 mg BID    miconazole NITRATE 2 % top powder  BID    nitroGLYCERIN SL tablet 0.4 mg Q5 Min PRN    ondansetron disintegrating tablet 8 mg Q8H PRN    ondansetron injection 4 mg Q4H PRN    oxyCODONE immediate release tablet 10 mg Q4H PRN    pantoprazole EC tablet 40 mg Daily    pneumoc 20-michael conj-dip cr(PF) (PREVNAR-20 (PF)) injection Syrg 0.5 mL vaccine x 1 dose    promethazine (PHENERGAN) 6.25 mg in 0.9% NaCl 50 mL IVPB Q6H PRN    simethicone chewable tablet 80 mg TID PRN    sodium chloride 0.9% flush 10 mL PRN    sodium chloride 0.9% flush 10 mL PRN    ticagrelor tablet 60 mg BID       Objective:     Vital Signs (Most Recent):  Temp: 97.5 °F (36.4 °C) (02/25/25 1127)  Pulse: 74 (02/25/25 1127)  Resp: 18 (02/25/25 1127)  BP: (!) 161/69 (02/25/25 1127)  SpO2: 99 % (02/25/25 1127) Vital Signs (24h Range):  Temp:  [97.5 °F (36.4 °C)-98.4 °F (36.9 °C)] 97.5 °F (36.4 °C)  Pulse:  [70-84] 74  Resp:  [16-18] 18  SpO2:  [96 %-99 %] 99 %  BP: (134-161)/(64-69) 161/69     Weight: 85.9 kg (189 lb 6 oz) (02/17/25 1616)  Body mass index is 27.97 kg/m².  Body surface area is 2.04 meters squared.    I/O last 3 completed shifts:  In: 250 [P.O.:250]  Out: 1900 [Urine:600; Other:1300]     Physical Exam  Vitals and nursing note reviewed.   Constitutional:       General: She is awake. She is not in acute distress.     Appearance: Normal appearance. She is well-developed.   HENT:      Head: Normocephalic and atraumatic.      Nose: Nose normal.      Mouth/Throat:      Mouth: Mucous membranes are moist.   Eyes:      Extraocular Movements: Extraocular movements intact.      Conjunctiva/sclera: Conjunctivae normal.   Cardiovascular:      Rate and Rhythm: Normal rate and regular rhythm.      Comments: RI TDC  Pulmonary:      Effort: Pulmonary effort is normal.      Breath sounds: Normal breath sounds.   Abdominal:      General: There is no distension.      Palpations: Abdomen is soft.   Musculoskeletal:         General: No tenderness or signs of injury.      Right lower leg: No edema.       Left lower leg: No edema.   Skin:     General: Skin is warm and dry.      Findings: No erythema or rash.   Neurological:      General: No focal deficit present.      Mental Status: She is alert. Mental status is at baseline.   Psychiatric:         Mood and Affect: Mood normal.         Behavior: Behavior normal.          Significant Labs:  CBC:   Recent Labs   Lab 02/25/25  0432   WBC 9.79   RBC 2.91*   HGB 8.2*   HCT 26.9*      MCV 92   MCH 28.2   MCHC 30.5*     CMP:   Recent Labs   Lab 02/25/25  0432   *   CALCIUM 8.1*   ALBUMIN 2.5*   *   K 4.2   CO2 23      BUN 26*   CREATININE 1.8*     All labs within the past 24 hours have been reviewed.     Significant Imaging:  Labs: Reviewed

## 2025-02-25 NOTE — PT/OT/SLP PROGRESS
Physical Therapy Treatment    Patient Name:  Lorena Contreras   MRN:  [id number]    Recommendations:     Discharge Recommendations: Low Intensity Therapy  Discharge Equipment Recommendations: none  Barriers to discharge: None    Assessment:     Lorena Contreras is a 74 y.o. female admitted with a medical diagnosis of Complete heart block.  She presents with the following impairments/functional limitations: weakness, impaired endurance, impaired self care skills, impaired functional mobility, gait instability, decreased lower extremity function, decreased upper extremity function, decreased coordination, decreased ROM, pain, impaired balance, decreased safety awareness, edema, impaired cardiopulmonary response to activity .    Rehab Prognosis: Good; patient would benefit from acute skilled PT services to address these deficits and reach maximum level of function.    Recent Surgery: Procedure(s) (LRB):  Left heart cath (Left) 6 Days Post-Op    Plan:     During this hospitalization, patient to be seen  (3-5x/week) to address the identified rehab impairments via gait training, therapeutic activities, therapeutic exercises, neuromuscular re-education and progress toward the following goals:    Plan of Care Expires:  02/25/25    Subjective     Chief Complaint: weakness and fatigue   Patient/Family Comments/goals: wants to go home   Pain/Comfort:  Location - Orientation 1: generalized  Pain Addressed 1: Pre-medicate for activity, Reposition, Nurse notified, Cessation of Activity      Objective:     Communicated with nurse  prior to session.  Patient found HOB elevated with telemetry, oxygen, bed alarm, PureWick, peripheral IV upon PT entry to room.     General Precautions: Standard, fall, respiratory  Orthopedic Precautions: N/A  Braces: N/A  Respiratory Status: Nasal cannula, flow 0.5 L/min   SpO2 : 95%-96% on RA with exertions, HR stable.   BP : 183/78 , pulse 77 , map 112 , nurse notified.   Functional  Mobility:  Bed Mobility:     Rolling Left:  supervision  Scooting: stand by assistance  Supine to Sit: stand by assistance  Transfers:     Sit to Stand:  contact guard assistance with rolling walker  Bed to Chair: contact guard assistance with  rolling walker  using  Step Transfer  Gait: pt ambulated ~ 6 ft with RW, CGA. Pt declined further therapy treatment 2* to fatigue.    Balance:  good in sitting, fair in standing       AM-PAC 6 CLICK MOBILITY  Turning over in bed (including adjusting bedclothes, sheets and blankets)?: 4  Sitting down on and standing up from a chair with arms (e.g., wheelchair, bedside commode, etc.): 4  Moving from lying on back to sitting on the side of the bed?: 4  Moving to and from a bed to a chair (including a wheelchair)?: 3  Need to walk in hospital room?: 3  Climbing 3-5 steps with a railing?: 3  Basic Mobility Total Score: 21       Treatment & Education:  Encouraged pt to perform BLE AP throughout the day, pt verbalized understanding.     Patient left up in chair with BLE elevated, heels offloading  all lines intact, call button in reach, nurse notified .   GOALS:   Multidisciplinary Problems       Physical Therapy Goals          Problem: Physical Therapy    Goal Priority Disciplines Outcome Interventions   Physical Therapy Goal     PT, PT/OT Progressing    Description: Goals to be met by: 25     Patient will increase functional independence with mobility by performin. Supine to sit with Stand-by Assistance  2. Sit to stand transfer with Stand-by Assistance  3. Gait  x 25-50 feet with Stand-by Assistance using Rolling Walker.   4. Ascend/descend 4 stair with right Handrails and Minimal  Assistance .   5. Lower extremity exercise program x10 reps per handout, with supervision                         DME Justifications:  No DME recommended requiring DME justifications    Time Tracking:     PT Received On: 25  PT Start Time: 1212     PT Stop Time: 1229  PT Total Time  (min): 17 min     Billable Minutes: Therapeutic Activity 17 and Total Time min with OT     Treatment Type: Treatment  PT/PTA: PTA     Number of PTA visits since last PT visit: 2     02/25/2025

## 2025-02-25 NOTE — PROGRESS NOTES
[facility]  Nephrology  Progress Note    Patient Name: Lorena Contreras  MRN: [id number]  Admission Date: 2/15/2025  Hospital Length of Stay: 10 days  Attending Provider: Germaine Murray MD   Primary Care Physician: Jorge Paris MD  Principal Problem:Complete heart block    Subjective:     HPI: 74 yo F with PMHx of DM2, Fibromyalgia, lymphoma s/p chemo, HTN, HLD, CVA, MI, CAD s/p PCIs CKD3b, HFpEF, and anemia presented to  ED via EMS c/o chest pain radiating to the back x 1 day. Vitals on arrival HR 45bpm. Labs on arrival  Cr 3.5, BNP 1115, EKG complet hear block. CXR normal. Pt given asprin and nitro by ems prior to arrival.     Nephrology consulted for KYA on CKD 3b    Prior records obtained and reviewed. Baseline Cr 2, last at baseline 1/14/25. Cr on arrival 3.5. Pt state she does not have a nephrologist. Pt states she was doing well prior to yesterday. She denies nsaid use, change in uop, n/v.     Interval History: s/p HD yesterday with 0.8L removed. No events overnight. Doing okay this morning. Reports anxiety which is worse at night. +UOP.   Outpatient dialysis chair secured at [facility] under my care.       Review of patient's allergies indicates:   Allergen Reactions    Novolin 70/30 (semi-synthetic) Nausea And Vomiting     Severe vomiting on Relion 70/30    Sulfa (sulfonamide antibiotics) Anaphylaxis    Talwin [pentazocine lactate] Anaphylaxis    Victoza [liraglutide] Nausea And Vomiting    Glipizide Nausea Only    Codeine     Influenza virus vaccines Hives    Iodine and iodide containing products Hives    Levetiracetam Itching    Lyrica [pregabalin] Hallucinations    Neurontin [gabapentin]      Possible associated myoclonic jerk    Rituxan [rituximab] Hives    Zoloft [sertraline] Nausea And Vomiting     Current Facility-Administered Medications   Medication Frequency    acetaminophen tablet 650 mg Q4H PRN    albuterol-ipratropium 2.5 mg-0.5 mg/3 mL nebulizer solution 3 mL Q4H PRN     aspirin EC tablet 81 mg Daily    atorvastatin tablet 80 mg QHS    cephALEXin capsule 500 mg Q8H    dextrose 50% injection 12.5 g PRN    dextrose 50% injection 25 g PRN    dicyclomine injection 10 mg Q6H PRN    FLUoxetine capsule 40 mg Daily    glucagon (human recombinant) injection 1 mg PRN    glucose chewable tablet 16 g PRN    glucose chewable tablet 24 g PRN    hydrALAZINE injection 10 mg Q4H PRN    hydrALAZINE tablet 100 mg Q8H    HYDROcodone-acetaminophen 5-325 mg per tablet 1 tablet Q4H PRN    HYDROmorphone (PF) injection 1 mg Q6H PRN    hydrOXYzine pamoate capsule 50 mg Q8H PRN    insulin aspart U-100 pen 0-10 Units QID (AC + HS) PRN    isosorbide mononitrate 24 hr tablet 60 mg Daily    lactobacillus acidophilus & bulgar 100 million cell packet 1 each BID    LORazepam tablet 1 mg Daily PRN    melatonin tablet 6 mg Nightly PRN    metoprolol tartrate (LOPRESSOR) tablet 25 mg BID    miconazole NITRATE 2 % top powder BID    nitroGLYCERIN SL tablet 0.4 mg Q5 Min PRN    ondansetron disintegrating tablet 8 mg Q8H PRN    ondansetron injection 4 mg Q4H PRN    oxyCODONE immediate release tablet 10 mg Q4H PRN    pantoprazole EC tablet 40 mg Daily    pneumoc 20-michael conj-dip cr(PF) (PREVNAR-20 (PF)) injection Syrg 0.5 mL vaccine x 1 dose    promethazine (PHENERGAN) 6.25 mg in 0.9% NaCl 50 mL IVPB Q6H PRN    simethicone chewable tablet 80 mg TID PRN    sodium chloride 0.9% flush 10 mL PRN    sodium chloride 0.9% flush 10 mL PRN    ticagrelor tablet 60 mg BID       Objective:     Vital Signs (Most Recent):  Temp: 97.5 °F (36.4 °C) (02/25/25 1127)  Pulse: 74 (02/25/25 1127)  Resp: 18 (02/25/25 1127)  BP: (!) 161/69 (02/25/25 1127)  SpO2: 99 % (02/25/25 1127) Vital Signs (24h Range):  Temp:  [97.5 °F (36.4 °C)-98.4 °F (36.9 °C)] 97.5 °F (36.4 °C)  Pulse:  [70-84] 74  Resp:  [16-18] 18  SpO2:  [96 %-99 %] 99 %  BP: (134-161)/(64-69) 161/69     Weight: 85.9 kg (189 lb 6 oz) (02/17/25 1616)  Body mass index is 27.97 kg/m².  Body  surface area is 2.04 meters squared.    I/O last 3 completed shifts:  In: 250 [P.O.:250]  Out: 1900 [Urine:600; Other:1300]     Physical Exam  Vitals and nursing note reviewed.   Constitutional:       General: She is awake. She is not in acute distress.     Appearance: Normal appearance. She is well-developed.   HENT:      Head: Normocephalic and atraumatic.      Nose: Nose normal.      Mouth/Throat:      Mouth: Mucous membranes are moist.   Eyes:      Extraocular Movements: Extraocular movements intact.      Conjunctiva/sclera: Conjunctivae normal.   Cardiovascular:      Rate and Rhythm: Normal rate and regular rhythm.      Comments: [facility]  Pulmonary:      Effort: Pulmonary effort is normal.      Breath sounds: Normal breath sounds.   Abdominal:      General: There is no distension.      Palpations: Abdomen is soft.   Musculoskeletal:         General: No tenderness or signs of injury.      Right lower leg: No edema.      Left lower leg: No edema.   Skin:     General: Skin is warm and dry.      Findings: No erythema or rash.   Neurological:      General: No focal deficit present.      Mental Status: She is alert. Mental status is at baseline.   Psychiatric:         Mood and Affect: Mood normal.         Behavior: Behavior normal.          Significant Labs:  CBC:   Recent Labs   Lab 02/25/25  0432   WBC 9.79   RBC 2.91*   HGB 8.2*   HCT 26.9*      MCV 92   MCH 28.2   MCHC 30.5*     CMP:   Recent Labs   Lab 02/25/25  0432   *   CALCIUM 8.1*   ALBUMIN 2.5*   *   K 4.2   CO2 23      BUN 26*   CREATININE 1.8*     All labs within the past 24 hours have been reviewed.     Significant Imaging:  Labs: Reviewed    Assessment/Plan:     ATN on HD - s/p HD yesterday; next HD planned for tomorrow. Will continue to monitor closely for recovery of renal function.     Anemia of CKD - hgb 8.2; below goal but stable. Tsat 12%, ferritin 206. Will start IV iron with HD.     Secondary HPTH - CCa and phos  normal. Will trend.     Hyponatremia - stable; continue fluid restriction.        Thank you for your consult. I will follow-up with patient. Please contact us if you have any additional questions.    Marifer Perry MD  Nephrology  [facility]

## 2025-02-25 NOTE — NURSING
[facility], [facility]  Nurses Note -- 4 Eyes      2/25/2025       Skin assessed on: Q Shift    Stage 2 pressure inj sacrum  Moisture related redness under L breast  Blanchable redness bilateral heels  [] No Pressure Injuries Present    []Prevention Measures Documented    [] Yes LDA  for Pressure Injury Previously documented     [x] Yes New Pressure Injury Discovered   [x] LDA for New Pressure Injury Added      Attending RN:  Kiki Helms RN     Second RN:  FRANCIS Drake

## 2025-02-25 NOTE — NURSING
[facility], [facility]  Nurses Note -- 4 Eyes      2/25/2025       Skin assessed on: Q Shift      [] No Pressure Injuries Present    [x]Prevention Measures Documented    [x] Yes LDA  for Pressure Injury Previously documented     [] Yes New Pressure Injury Discovered   [] LDA for New Pressure Injury Added      Attending RN:  Beba Woo LPN     Second RN:  FRANCIS Swanson

## 2025-02-25 NOTE — NURSING
Pt complaining of gas pain, simethicone given 9pm but  it didn't help, pain is 6/10. Informed Dr Gonzalez, GI cocktail and bentyl IM ordered and given, per pt feels better now.

## 2025-02-26 ENCOUNTER — TELEPHONE (OUTPATIENT)
Dept: FAMILY MEDICINE | Facility: CLINIC | Age: 75
End: 2025-02-26

## 2025-02-26 ENCOUNTER — OCHSNER VIRTUAL EMERGENCY DEPARTMENT (OUTPATIENT)
Facility: CLINIC | Age: 75
End: 2025-02-26
Payer: MEDICARE

## 2025-02-26 ENCOUNTER — OFFICE VISIT (OUTPATIENT)
Dept: FAMILY MEDICINE | Facility: CLINIC | Age: 75
End: 2025-02-26
Payer: MEDICARE

## 2025-02-26 ENCOUNTER — NURSE TRIAGE (OUTPATIENT)
Dept: ADMINISTRATIVE | Facility: CLINIC | Age: 75
End: 2025-02-26
Payer: MEDICARE

## 2025-02-26 DIAGNOSIS — R11.0 NAUSEA: ICD-10-CM

## 2025-02-26 DIAGNOSIS — R06.02 SHORTNESS OF BREATH: Primary | ICD-10-CM

## 2025-02-26 DIAGNOSIS — F41.9 ANXIETY: ICD-10-CM

## 2025-02-26 LAB
OHS CV AF BURDEN PERCENT: < 1
OHS CV DC REMOTE DEVICE TYPE: NORMAL
OHS CV ICD SHOCK: NO
OHS CV RV PACING PERCENT: 97.26 %

## 2025-02-26 PROCEDURE — 98006 SYNCH AUDIO-VIDEO EST MOD 30: CPT | Mod: 95,,,

## 2025-02-26 PROCEDURE — 3044F HG A1C LEVEL LT 7.0%: CPT | Mod: CPTII,95,,

## 2025-02-26 PROCEDURE — 1159F MED LIST DOCD IN RCRD: CPT | Mod: CPTII,95,,

## 2025-02-26 PROCEDURE — 1160F RVW MEDS BY RX/DR IN RCRD: CPT | Mod: CPTII,95,,

## 2025-02-26 PROCEDURE — G2211 COMPLEX E/M VISIT ADD ON: HCPCS | Mod: 95,,,

## 2025-02-26 PROCEDURE — 1111F DSCHRG MED/CURRENT MED MERGE: CPT | Mod: CPTII,95,,

## 2025-02-26 PROCEDURE — 3066F NEPHROPATHY DOC TX: CPT | Mod: CPTII,95,,

## 2025-02-26 RX ORDER — ONDANSETRON 8 MG/1
8 TABLET, ORALLY DISINTEGRATING ORAL EVERY 8 HOURS PRN
Qty: 20 TABLET | Refills: 2 | Status: SHIPPED | OUTPATIENT
Start: 2025-02-26

## 2025-02-26 NOTE — PROGRESS NOTES
The patient location is: Patient Home  The chief complaint leading to consultation is: as below  Visit type:   Virtual visit with synchronous audio and video    Total time spent with patient: 15 minutes  Each patient to whom he or she provides medical services by telemedicine is:  (1) informed of the relationship between the physician and patient and the respective role of any other health care provider with respect to management of the patient; and (2) notified that she may decline to receive medical services by telemedicine and may withdraw from such care at any time.      HPI     Lorena Contreras is a 74 y.o. female with multiple medical diagnoses as listed in the medical history and problem list that presents for shortness of breath and anxiety. PCP Dr. Paris with last visit in this clinic on 2/7/25.         Shortness of Breath  This is a new problem. The current episode started in the past 7 days. The problem occurs constantly. The problem has been waxing and waning. Associated symptoms include abdominal pain, chest pain, leg pain, leg swelling, neck pain, orthopnea, PND and a sore throat. Pertinent negatives include no claudication, coryza, ear pain, fever, headaches, hemoptysis, rash, rhinorrhea, sputum production, swollen glands, syncope, vomiting or wheezing. The symptoms are aggravated by emotional upset, any activity and lying flat. Risk factors include prolonged immobilization. She has tried beta agonist inhalers and cool air for the symptoms. The treatment provided mild relief. Her past medical history is significant for CAD, a heart failure and a recent surgery. There is no history of allergies, aspirin allergies, asthma, COPD, DVT or pneumonia.       Recent hospital encounter from  2/25/25, see summary below:         HPI:   A pleasant  72 yo F with PMHx of  DM2, Fibromyalgia, lymphoma s/p chemo, HTN, HLD, CVA, MI, CAD s/p PCIs  CKD3b, HFpEF, and anemia who presented to the ED with a chief  complaint of chest pain radiating to the back with onset a day before presentation.     Pain was said to be sudden in onset; no associated nausea or diaphoresis.  No Preceding PND orthopnea or leg swelling; pain was described as different from her previous coronary events.  Patient however endorsed dizziness but this has been going on for some time.  She denied any syncopal event  Symptoms progress and this prompted patient to come to the ED     Retrospective chart review indicates multiple ED visits for observation for atypical chest pain.  Cardiac history is significant for CAD with JESIKA x 2 to RCA 3/29/19 - JESIKA to RI and Cx 1/8/20        On presentation to the ED; patient was bradycardic to 32 BPM which subsequently improved and mildly hypertensive.  EKG showed complete heart block.  Patient was admitted to the ICU for further management.     Procedure(s) (LRB):  Left heart cath (Left)       Hospital Course:   73-year-old female with history of diabetes, anxiety,HTN, HLD, CVA, MI, CAD s/p PCIs  CKD3b, HFpEF, and anemia admitted to ICU on 02/15/2025 for complete heart block and KYA.  Cardiology consulted- patient s/p ppm on 02/17.  Patient to start aspirin and Brilinta on 02/18.   Patient with chest pain and tachycardia overnight on 02/18. Found to have elevated troponin 1.9 and peaked at 3.3.  EKG noted and reviewed with NO STEMI and unchanged from previous on 2/17. Device interrogated - normal function. No PMT Discussed with cardiology. S/p heart catheterization on 02/19, LHC indicated triple-vessel disease with patent stents and moderate LAD/RCA disease - no culprit identified . Continue medical Rx per cardiology. Nephrology consulted for KYA on CKD. Pt with severe nausea and vomiting, BUN >100. THDC placed on 02/21, and 1st HD session started on 02/21, 2nd session on 02/22 and plan for 3rd session on 02/24.  Patient was discharged home with home health to follow up with Cardiology, Nephrology and PCP.  Patient  expressed concern about her heart rate which she attributed her anxiety; patient was counseled.  NC oxygen was weaned off before discharge.  Patient was instructed to come back to the hospital in the event of shortness of breaths, chest pain, or fever.  All questions were answered to her satisfaction and she verbalized understanding.            Patient reports shortness of breath since hospital discharge. POX 92-96% room air at home. Has history of anxiety, and is worried about pacemaker not working properly, which keeps her up at night. Gets tremors when anxious and sometimes has panic attacks. PRN Ativan 0.5mg helps with anxiety, but puts her in a daze. Has not been able to rest or get a full night's sleep in several days. Reports only sleeping for 2 hours at a time. Reports chest wall pain in area of recently placed pacemaker.  Not eating well, no appetite and nausea when she tries to eat.   Scheduled HD tomorrow morning at 9:45am-- recently started dialysis due to KYA on CKD while in hospital, last session 2/24/25.    Assessment & Plan     1. Shortness of breath    Patient tachypneic during video visit; daughter reports that shortness of breath has not changed or worsened since being in the hospital. Pulse ox remains stable. Discussed that if symptoms continue or worsen, that patient should go to ED for further evaluation as I am unable to fully evaluate over virtual visit. Discussed with [facility] team, who reviewed chart and recommended to go to ED with any severe or worsening symptoms. Discussed with patient and daughter who verbalized understanding.     - E-Consult to [facility]    2. Anxiety    Continue PRN Ativan for anxiety which has been helping; Encouraged the patient to perform self-calming techniques, such as deep breathing/relaxation techniques and exercise. Minimize stimulation to help patient rest.     3. Nausea    PRN zofran for nausea prior to eating; can be used up to TID  PRN.     - ondansetron (ZOFRAN-ODT) 8 MG TbDL; Dissolve 1 tablet (8 mg total) by mouth every 8 (eight) hours as needed (nausea).  Dispense: 20 tablet; Refill: 2        Recommended follow up with in person visit within 48 hours if patient does not go to ED.   Discussed DDx, condition, and treatment.   Education sent to patient portal/included in after visit summary.  ED precautions given.   Notify provider if symptoms do not resolve or increase in severity.   Patient verbalizes understanding and agrees with plan of care.    --------------------------------------------      Health Maintenance:  Health Maintenance         Date Due Completion Date    COVID-19 Vaccine (1) Never done ---    Shingles Vaccine (1 of 2) Never done ---    RSV Vaccine (Age 60+ and Pregnant patients) (1 - Risk 60-74 years 1-dose series) Never done ---    DEXA Scan 12/08/2018 12/8/2016    Mammogram 03/26/2019 3/26/2018    Foot Exam 10/20/2023 10/20/2022    Influenza Vaccine (1) Never done ---    Diabetic Eye Exam 02/28/2025 2/28/2024    Diabetes Urine Screening 07/10/2025 7/10/2024    Hemoglobin A1c 08/18/2025 2/18/2025    Lipid Panel 01/14/2026 1/14/2025    TETANUS VACCINE 05/28/2031 5/28/2021    Colorectal Cancer Screening 11/04/2031 11/4/2024            Discussed the importance of overdue vaccines which were offered during this encounter. Patient declined overdue vaccines at this time and Advised patient on the importance of completing overdue health maintenance items    Follow Up:  Follow up in about 2 days (around 2/28/2025), or if symptoms worsen or fail to improve.    Exam     Review of Systems:  (as noted above)  Review of Systems   Constitutional:  Negative for fever.   HENT:  Positive for sore throat. Negative for ear pain and rhinorrhea.    Respiratory:  Positive for shortness of breath. Negative for hemoptysis, sputum production and wheezing.    Cardiovascular:  Positive for chest pain, orthopnea, leg swelling and PND. Negative for  claudication and syncope.   Gastrointestinal:  Positive for abdominal pain. Negative for vomiting.   Musculoskeletal:  Positive for neck pain.   Skin:  Negative for rash.   Neurological:  Negative for headaches.       Physical Exam:   Physical Exam  Constitutional:       Appearance: She is ill-appearing.   Pulmonary:      Effort: Tachypnea present.   Neurological:      Mental Status: She is alert.   Psychiatric:         Mood and Affect: Mood is anxious.       There were no vitals filed for this visit.   There is no height or weight on file to calculate BMI.        History     Past Medical History:  Past Medical History:   Diagnosis Date    Age-related osteoporosis with current pathological fracture with routine healing 11/13/2024    Allergy     Altered mental status 06/19/2022    DYSARTHRIA, SPASTIC MOVEMENTS & DIFFICULTY SWALLOWING    Anemia     Anxiety     Arthritis     Cataract     both removed    Colon polyps     Coronary artery disease     Depression     Diabetes mellitus, type II     Disorder of kidney and ureter     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/2023    Fibromyalgia     Follicular lymphoma     GERD (gastroesophageal reflux disease)     HTN (hypertension)     Hyperlipidemia     MI (myocardial infarction) 03/2019    Personal history of colonic polyps     Restless leg syndrome     Stroke        Past Surgical History:  Past Surgical History:   Procedure Laterality Date    A-V CARDIAC PACEMAKER INSERTION N/A 2/17/2025    Procedure: INSERTION, CARDIAC PACEMAKER, DUAL CHAMBER;  Surgeon: Karl Rico MD;  Location: [facility] CATH LAB;  Service: Cardiology;  Laterality: N/A;    APPLICATION OF WOUND VACUUM-ASSISTED CLOSURE DEVICE Right 9/25/2024    Procedure: APPLICATION, WOUND VAC;  Surgeon: Neto Davalos MD;  Location: [facility] OR [address] [place] [address];  Service: Orthopedics;  Laterality: Right;    COLONOSCOPY  11/07/2012    Colon polyp found; repeat in 5 years    COLONOSCOPY N/A 11/4/2024    Procedure: COLONOSCOPY;   Surgeon: David Castandea MD;  Location: [facility] (2ND FLR);  Service: Endoscopy;  Laterality: N/A;    DEBRIDEMENT OF LOCAL FLAP Right 9/25/2024    Procedure: DEBRIDEMENT, LOCAL FLAP;  Surgeon: Neto Davalos MD;  Location: [facility] OR 2ND FLR;  Service: Orthopedics;  Laterality: Right;    ELBOW SURGERY Right 2015    dislocation repair     ESOPHAGOGASTRODUODENOSCOPY  11/07/2012    atrophic gastritis, H pylori testing negative    INCISION AND DRAINAGE FOOT Right 6/2/2021    Procedure: INCISION AND DRAINAGE, FOOT, bone biopsy;  Surgeon: Quiana Penn DPM;  Location: [facility];  Service: Podiatry;  Laterality: Right;    IRRIGATION AND DEBRIDEMENT OF LOWER EXTREMITY Right 9/25/2024    Procedure: IRRIGATION AND DEBRIDEMENT, LOWER EXTREMITY; slider table, supine, bone foam, cysto tubing, 6L NS/dakins/peroxide, culture swabs;  Surgeon: Neto Davalos MD;  [facility];  Service: Orthopedics;  Laterality: Right;    KNEE SURGERY Bilateral 2015    scoped    LEFT HEART CATHETERIZATION Left 3/29/2019    Procedure: Left heart cath;  Surgeon: Bladimir Barbosa MD;  Location: [facility] CATH LAB;  Service: Cardiology;  Laterality: Left;    LEFT HEART CATHETERIZATION Left 11/18/2019    Procedure: Left heart cath;  Surgeon: Karl Rico MD;  Location: [facility] CATH LAB;  Service: Cardiology;  Laterality: Left;    LEFT HEART CATHETERIZATION Left 1/8/2020    Procedure: Left heart cath, right radial, noon start;  Surgeon: Christos Monreal MD;  Location: [facility] CATH LAB;  Service: Cardiology;  Laterality: Left;  RN Pre Op 1-6-20.  To be admitted 1-7-20 sor Aspirin Disensitation    LEFT HEART CATHETERIZATION Left 2/19/2025    Procedure: Left heart cath;  Surgeon: Karl Rico MD;  Location: [facility] CATH LAB;  Service: Cardiology;  Laterality: Left;    OPEN REDUCTION AND INTERNAL FIXATION (ORIF) OF FRACTURE OF ACETABULUM Right 8/16/2024    Procedure: ORIF, FRACTURE, ACETABULUM;  Surgeon: Neto Davalos  MD ISAURA;  Location: [facility] OR [address] [place] [address];  Service: Orthopedics;  Laterality: Right;  anterior and lateral pelvic incisions    OPEN REDUCTION AND INTERNAL FIXATION (ORIF) OF PILON FRACTURE Right 8/14/2024    Procedure: ORIF, FRACTURE, PILON;  Surgeon: Neto Davalos MD;  [facility];  Service: Orthopedics;  Laterality: Right;    TONSILLECTOMY  1955    ULTRASOUND GUIDANCE  1/8/2020    Procedure: Ultrasound Guidance;  Surgeon: Christos Monreal MD;  Location: [facility] CATH LAB;  Service: Cardiology;;       Social History:  Social History[1]    Family History:  Family History   Problem Relation Name Age of Onset    Cancer Mother          colon    Heart disease Mother      Cancer Father          lung    Lung cancer Brother      Diabetes Sister      Hypertension Sister      Allergy (severe) Daughter erin     No Known Problems Daughter      Stroke Neg Hx      Hyperlipidemia Neg Hx         Allergies and Medications: (updated and reviewed)  Review of patient's allergies indicates:   Allergen Reactions    Novolin 70/30 (semi-synthetic) Nausea And Vomiting     Severe vomiting on Relion 70/30    Sulfa (sulfonamide antibiotics) Anaphylaxis    Talwin [pentazocine lactate] Anaphylaxis    Victoza [liraglutide] Nausea And Vomiting    Glipizide Nausea Only    Codeine     Influenza virus vaccines Hives    Iodine and iodide containing products Hives    Levetiracetam Itching    Lyrica [pregabalin] Hallucinations    Neurontin [gabapentin]      Possible associated myoclonic jerk    Rituxan [rituximab] Hives    Zoloft [sertraline] Nausea And Vomiting     Current Medications[2]    Patient Care Team:  Jorge Paris MD as PCP - General (Internal Medicine)  Javier Reilly OD as Consulting Physician (Optometry)  Aiden Zee OD as Consulting Physician (Ophthalmology)  Mary Bojorquze III, MD as Consulting Physician (Orthopedic Surgery)  [facility] - (DME Provider)  Kimbelry Mckoy as ED  Navigator  Stephanie Guerrero LPN as Care Coordinator       - The patient was sent an After Visit Summary virtually that lists all medications with directions, allergies, education, orders placed during this encounter and follow-up instructions.      - I have reviewed the patient's medical information including past medical, family, and social history sections including the medications and allergies.      - We discussed the patient's current medications.     This note was created by combination of typed  and MModal dictation.  Transcription errors may be present.  If there are any questions, please contact me.                   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  2 dtr.  Lives with .  3 cats and a dog.  Retired.  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  Lives in house, 1 story and 4 steps up and has a ramp.      Enjoys crafting.  Unable to bowl due to myalgias.       Social Drivers of Health     Financial Resource Strain: Medium Risk (2/26/2025)    Overall Financial Resource Strain (CARDIA)     Difficulty of Paying Living Expenses: Somewhat hard   Food Insecurity: No Food Insecurity (2/26/2025)    Hunger Vital Sign     Worried About Running Out of Food in the Last Year: Never true     Ran Out of Food in the Last Year: Never true   Transportation Needs: Unmet Transportation Needs (2/26/2025)    PRAPARE - Transportation     Lack of Transportation (Medical): Yes     Lack of Transportation (Non-Medical): No   Physical Activity: Inactive (2/26/2025)    Exercise Vital Sign     Days of  Exercise per Week: 0 days     Minutes of Exercise per Session: 0 min   Stress: Stress Concern Present (2/26/2025)    [employer] - Occupational Stress Questionnaire     Feeling of Stress : Very much   Housing Stability: Low Risk  (2/26/2025)    Housing Stability Vital Sign     Unable to Pay for Housing in the Last Year: No     Number of Times Moved in the Last Year: 0     Homeless in the Last Year: No   [2]   Current Outpatient Medications   Medication Sig Dispense Refill    albuterol (PROVENTIL/VENTOLIN HFA) 90 mcg/actuation inhaler Inhale 2 puffs into the lungs every 6 (six) hours as needed for Wheezing or Shortness of Breath. 18 g 1    aluminum & magnesium hydroxide-simethicone (MYLANTA MAX STRENGTH) 400-400-40 mg/5 mL suspension Take 30 mLs by mouth every 6 (six) hours as needed for Indigestion.      aspirin 81 MG Chew Take 1 tablet (81 mg total) by mouth once daily. Hold to avoid bleed risk on triple therapy and then resume on oct 27 once eliquis stops on oct 26th      atorvastatin (LIPITOR) 80 MG tablet Take 1 tablet (80 mg total) by mouth every evening. 90 tablet 3    cephALEXin (KEFLEX) 500 MG capsule Take 1 capsule (500 mg total) by mouth every 8 (eight) hours. 9 capsule 0    DEXCOM G7  Misc Use 1  to track blood glucose, ICD10: E11.65 1 each 0    DEXCOM G7 SENSOR Samina Use 1 sensor every 10 days to track blood glucose, ICD10: E11.65, okay with 90 day supply if possible 3 each 11    FLUoxetine 40 MG capsule Take 1 capsule (40 mg total) by mouth once daily. 90 capsule 3    hydrALAZINE (APRESOLINE) 100 MG tablet Take 1 tablet (100 mg total) by mouth every 8 (eight) hours. 135 tablet 3    insulin aspart U-100 (NOVOLOG) 100 unit/mL (3 mL) InPn pen Inject 0-10 Units into the skin before meals and at bedtime as needed (Hyperglycemia).      insulin glargine U-100, Lantus, 100 unit/mL (3 mL) SubQ InPn pen Inject 10 Units into the skin every evening. 15 mL 6    isosorbide  "mononitrate (IMDUR) 60 MG 24 hr tablet Take 1 tablet (60 mg total) by mouth once daily. 30 tablet 11    LORazepam (ATIVAN) 1 MG tablet TAKE 1 TABLET BY MOUTH ONCE DAILY AS NEEDED FOR ANXIETY 30 tablet 0    LORazepam (ATIVAN) 1 MG tablet Take 1 tablet (1 mg total) by mouth daily as needed for Anxiety. 4 tablet 0    meclizine (ANTIVERT) 12.5 mg tablet Take 1 tablet (12.5 mg total) by mouth 2 (two) times daily as needed for Dizziness. 28 tablet 0    NOVOLOG FLEXPEN U-100 INSULIN 100 unit/mL (3 mL) InPn pen Inject 10 Units into the skin 3 (three) times daily with meals. 15 mL 8    ondansetron (ZOFRAN-ODT) 8 MG TbDL Dissolve 1 tablet (8 mg total) by mouth every 8 (eight) hours as needed (nausea). 20 tablet 2    oxyCODONE (ROXICODONE) 10 mg Tab immediate release tablet Take 1 tablet (10 mg total) by mouth every 12 (twelve) hours as needed for Pain. 8 tablet 0    pantoprazole (PROTONIX) 40 MG tablet Take 1 tablet (40 mg total) by mouth once daily. 90 tablet 3    pen needle, diabetic (BD ULTRA-FINE SAGAR PEN NEEDLE) 32 gauge x 5/32" Ndle One pen needle use with insulin pen 4 times a day.  ICD-10: E11.9 150 each 11    QUEtiapine (SEROQUEL) 50 MG tablet Take 1 tablet (50 mg total) by mouth nightly as needed (insomnia). 90 tablet 3    ticagrelor (BRILINTA) 60 mg tablet Take 1 tablet (60 mg total) by mouth 2 (two) times daily. 180 tablet 3    tobramycin-dexAMETHasone 0.3-0.1% (TOBRADEX) 0.3-0.1 % DrpS Place 1 drop into the right eye 4 (four) times daily.      triamcinolone acetonide 0.1% (KENALOG) 0.1 % cream Apply topically 2 (two) times daily. 80 g 2     No current facility-administered medications for this visit.     "

## 2025-02-26 NOTE — PLAN OF CARE-OVED
[facility] Plan of Care Note  Referral Source: Primary Care Provider                               Chief Complaint   Patient presents with    Shortness of Breath     73-year-old female with history of diabetes, anxiety,HTN, HLD, CVA, MI, CAD s/p PCIs  CKD3b, HFpEF, and anemia being seen on virtual visit with primary care today complaining of worsening shortness of breath. Recent hospitalization, d/c yesterday, was supposed to have HD today but missed, rescheduled for tomorrow.       Recommendation: Emergency Department          Patient with multiple complaints per virtual primary care provider, including shallow breathing, shortness of breath, chest pain. Symptoms may be related to anxiety but with recent medical issues including placement of pacemaker for heart block and renal failure recommend ER evaluation.   Emergency Department: [facility]               Encounter Diagnosis   Name Primary?    Shortness of breath Yes      Narcisa Munguia MD   5:49 PM

## 2025-02-26 NOTE — NURSING
Pt was assisted to w/c without injury and escorted down to her rise by staff CNA with her family at her side,safety maintained.

## 2025-02-26 NOTE — TELEPHONE ENCOUNTER
Daughter calling and she said that her moms's O2 sat was 92 and she was a little anxious and felt that she was a little SOB but she said that she was d/c last night and appeared to be about the same. The nurse from the hospital seemed to think is was anxiety and pt is asleep right now and O2 sat was 92 and the daughter checked her's and she was 93. She said that she has appt at 4:30 with provider and she also has a little swelling and that her dialysis isnt till tomorrow. Pts daughter will get another pulse ox and recheck and doesn't want to wake her up but will call back if any other needs or she gets worse. SHe said that at this time she is resting comfortably. Pt took some pain medication and sleeping. No triage                     Reason for Disposition   Health information question, no triage required and triager able to answer question    Protocols used: Information Only Call - No Triage-A-[place]

## 2025-02-27 ENCOUNTER — TELEPHONE (OUTPATIENT)
Dept: FAMILY MEDICINE | Facility: CLINIC | Age: 75
End: 2025-02-27
Payer: MEDICARE

## 2025-02-27 ENCOUNTER — PATIENT OUTREACH (OUTPATIENT)
Facility: OTHER | Age: 75
End: 2025-02-27
Payer: MEDICARE

## 2025-02-27 ENCOUNTER — PATIENT OUTREACH (OUTPATIENT)
Dept: ADMINISTRATIVE | Facility: CLINIC | Age: 75
End: 2025-02-27
Payer: MEDICARE

## 2025-02-27 NOTE — PROGRESS NOTES
Spoke with patient's daughter to see if patient received the care she was needing.  Patient's daughter stated they decided not to go to the emergency department because patient started feeling better.  Daughter requested psych appointment to be scheduled for patient's medication management.  Unable to schedule appointment.  In-basket staff message sent to [facility] for further assistance with scheduling.

## 2025-02-27 NOTE — TELEPHONE ENCOUNTER
Spoke with patient's daughter after discussing case with Sylvia. Recommended go to ED with any severe or worsening symptoms. Patient's daughter Noelle reports that SOB is unchanged from hospitalization and that her mother is now resting comfortably after taking an Ativan. Breathing is deeper and more regular. Encouraged to seek medical attention with any changes/worsening or severe symptoms. Daughter verbalized understanding and is agreeable to plan.

## 2025-02-27 NOTE — TELEPHONE ENCOUNTER
The patient's daughter, Noelle, states the patient is taking hydrALAZINE (APRESOLINE) 100 mg tablet, take 1 (one) tablet by mouth every 8 (eight) hours as needed.

## 2025-02-27 NOTE — PROGRESS NOTES
C3 nurse spoke with Lorena NEIL Diane, patient's daughter, Noelle,  for a TCC post hospital discharge follow up call. The patient has a scheduled HOSFU appointment with Heavenly Hogue FNP on 03/05/2025 at 1 PM.

## 2025-02-27 NOTE — TELEPHONE ENCOUNTER
Call placed to patient for scheduling HOSFU for 2-28-25. Voicemail left for a return call to clinic. Patient is scheduled 3-5-25.    40 y.o. F - with anxiety/IBS/Asthma/hypertensive urgency and tachycardia with unrevealing, exhaustive systemic workup

## 2025-02-27 NOTE — PROGRESS NOTES
"Patient seen by primary care provider via virtual visit with the following complaints: "73-year-old female with history of diabetes, anxiety,HTN, HLD, CVA, MI, CAD s/p PCIs CKD3b, HFpEF, and anemia being seen on virtual visit with primary care today complaining of worsening shortness of breath. Recent hospitalization, d/c yesterday, was supposed to have HD today but missed, rescheduled for tomorrow."    Heavenly Hogue FNP with [facility] consulted with Sylvia MD on call, Dr. Narcisa Munguia, and disposition was for patient to go to the Emergency Department for Eval/Treatment. Follow up scheduled today 02/27/2025 to assess for any additional needs/concerns. Appointment reminder scheduled on 3/3/2025 for appointment with primary care on 3/5/2025 for a Hospital F/U with provider, Heavenly Hogue FNP.    "

## 2025-02-27 NOTE — TELEPHONE ENCOUNTER
----- Message from ESCOBAR Lamar sent at 2/26/2025  6:25 PM CST -----  Please schedule her a hospital follow up with me or Dr. Paris on Thursday afternoon or Friday if possible. Thank you!

## 2025-02-28 ENCOUNTER — OFFICE VISIT (OUTPATIENT)
Dept: CARDIOLOGY | Facility: CLINIC | Age: 75
End: 2025-02-28
Payer: MEDICARE

## 2025-02-28 VITALS
WEIGHT: 189.38 LBS | HEIGHT: 69 IN | HEART RATE: 63 BPM | BODY MASS INDEX: 28.05 KG/M2 | DIASTOLIC BLOOD PRESSURE: 70 MMHG | OXYGEN SATURATION: 95 % | SYSTOLIC BLOOD PRESSURE: 110 MMHG

## 2025-02-28 DIAGNOSIS — I27.20 PULMONARY HYPERTENSION: ICD-10-CM

## 2025-02-28 DIAGNOSIS — I44.2 CHB (COMPLETE HEART BLOCK): ICD-10-CM

## 2025-02-28 DIAGNOSIS — N18.32 ACUTE KIDNEY INJURY SUPERIMPOSED ON STAGE 3B CHRONIC KIDNEY DISEASE: ICD-10-CM

## 2025-02-28 DIAGNOSIS — I25.118 CORONARY ARTERY DISEASE OF NATIVE ARTERY OF NATIVE HEART WITH STABLE ANGINA PECTORIS: ICD-10-CM

## 2025-02-28 DIAGNOSIS — N17.9 ACUTE KIDNEY INJURY SUPERIMPOSED ON STAGE 3B CHRONIC KIDNEY DISEASE: ICD-10-CM

## 2025-02-28 DIAGNOSIS — E78.2 MIXED HYPERLIPIDEMIA: ICD-10-CM

## 2025-02-28 DIAGNOSIS — I10 PRIMARY HYPERTENSION: ICD-10-CM

## 2025-02-28 DIAGNOSIS — Z95.0 CARDIAC PACEMAKER IN SITU: Primary | ICD-10-CM

## 2025-02-28 DIAGNOSIS — I25.10 CORONARY ARTERY DISEASE, UNSPECIFIED VESSEL OR LESION TYPE, UNSPECIFIED WHETHER ANGINA PRESENT, UNSPECIFIED WHETHER NATIVE OR TRANSPLANTED HEART: ICD-10-CM

## 2025-02-28 DIAGNOSIS — I44.2 COMPLETE HEART BLOCK: ICD-10-CM

## 2025-02-28 LAB — HBA1C MFR BLD: 6.6 % (ref 4.8–5.9)

## 2025-02-28 PROCEDURE — 99999 PR PBB SHADOW E&M-EST. PATIENT-LVL V: CPT | Mod: PBBFAC,HCNC,, | Performed by: INTERNAL MEDICINE

## 2025-02-28 NOTE — PROGRESS NOTES
Subjective   Patient ID:  Lorena Contreras is a 74 y.o. female who presents for follow-up of Suture / Staple Removal and Post-op Evaluation      HPI      Medtronic dual PPM 2/17/25 for CHF, CAD - JESIKA x 2 to RCA 3/29/19 - JESIKA to RI and Cx 1/8/20, HTN, HLD, DM, ESRD on HD     11/18/19 [facility] - EDP 16, LAD mid 50%, Cx long mid 80-90% - diffusely diseased - similar to [facility] 3/29/19, OM1 90% mid, RCA stents patent 50% beyond stents     Will review with interventional staff for possible out patient PCI - continue with medical Rx for now     Previously followed by Dr Barbosa - last seen 5/16/19  Patient is here for follow-up of coronary artery disease and ST-elevation MI.  She underwent PCI to the RCA.  She is known to have other blockages well for which she re-presented to the emergency department was admitted for observation for atypical chest pain.  She felt like it was anxiety related and she no longer has had any since discharge.  She underwent nuclear stress test which showed no significant ischemia.  She denies any other associated symptoms currently.  She has experienced no PND, orthopnea or lower extremity edema.  She denies any dizziness, presyncope or syncope.  She says she was given Atarax on discharge which has helped with her anxiety.     Her follow-up coronary artery disease and previous ST-elevation MI.  She is feeling anxious and feels like she has a panic attack today.  She again is somewhat tearful and says that her  is been feeling ill as well.  She says she was started on Prozac by her primary care physician but does not feel like this is doing much in terms of calming her down.  She still has some mild residual chest pain when she mostly feels anxious.  This is dissimilar to her previous angina symptoms.  She denies any other associated symptoms.  She denies any PND, orthopnea or lower extremity edema.  She denies any dizziness, presyncope or syncope.         2/19/25 [facility] - EDP 25, Peak to peak LV  to Aortic gradient 20 mmHg, LAD long 50% mid, D1 long 70% proximal, RI luminal irregularities - stent patent, Cx luminal irregularities - stent patent, RCA patent mid stent with 50% ISR     No culprit lesion identified - reviewed with Dr Saldaña - medical Rx  25 cc contrast used - hydrate post procedure  Continue ASA/brilinta    2/17/25 Medtronic dual pacemaker placed left SC vein           [facility] 1/8/20  1.  Successful PCI of proximal ramus with drug-eluting stent x1 (2.0 x 6 mm).  IVUS guidance was utilized for this PCI.  Post PCI IVUS demonstrated well-opposed and expanded stent.  MADINA 3 flow pre and post PCI     2.  Successful PCI of circumflex with drug-eluting stent x1 (2.25 x 22 mm).  MADINA 3 flow pre and post PCI     [facility] 11/18/19  Patent RCA mid stents with 50% lesion after stents  Prox Cx to Dist Cx lesion , 95% stenosed.  Ost 1st Mrg to 1st Mrg lesion , 90% stenosed.  LVEDP (Pre): 16       [facility]: 3-19  Three vessel coronary artery disease.  Successful PCI for acute myocardial infarction of culprit RCA.  Prox RCA lesion , 99% stenosed reduced to 0%..  A STENT RESOLUTE CRISSY [id number] stent was successfully placed at 14 ROGER  Mid RCA lesion , 95% stenosed reduced to 0%..  A STENT RESOLUTE CRISSY 3.0X12MM stent was successfully placed at 12 ROGER  Prox Cx to Dist Cx lesion , 95% stenosed.  Ost 1st Mrg to 1st Mrg lesion , 90% stenosed.  Diffusely diseased LAD which is small vessel as well     Echo 2/15/25    Left Ventricle: The left ventricle is normal in size. There is moderate concentric hypertrophy. There is hyperdynamic systolic function with a visually estimated ejection fraction of 70 - 75%.    Right Ventricle: Normal right ventricular cavity size. Systolic function is normal.    Left Atrium: Left atrium is moderately dilated.    Right Atrium: Right atrium is mildly dilated.    Aortic Valve: There is moderate stenosis. Aortic valve area by VTI is 0.8 cm². Aortic valve peak velocity is 3.4 m/s. Mean gradient is 27  mmHg. The dimensionless index is 0.27.    Mitral Valve: There is mild stenosis. The mean pressure gradient across the mitral valve is 5 mmHg at a heart rate of 42  bpm. There is mild regurgitation.    Tricuspid Valve: There is moderate regurgitation.    Pulmonary Artery: The estimated pulmonary artery systolic pressure is 49 mmHg.    IVC/SVC: Intermediate venous pressure at 8 mmHg.       Echo 6/20/22  The left ventricle is normal in size with mild concentric hypertrophy and hyperdynamic systolic function.  The estimated ejection fraction is 75%.  Normal right ventricular size with normal right ventricular systolic function.  There is mild aortic valve stenosis.  Aortic valve area is 1.59 cm2; peak velocity is 2.71 m/s; mean gradient is 19 mmHg.  There is moderate mitral stenosis.  The mean diastolic gradient across the mitral valve is 8 mmHg at a heart rate of 90 bpm.  The estimated PA systolic pressure is 58 mmHg.  There is pulmonary hypertension.     Stress test 12/1/22    Normal myocardial perfusion scan. There is no evidence of myocardial ischemia or infarction.    The gated perfusion images showed an ejection fraction of 71% post stress.    The EKG portion of this study is negative for ischemia.    The patient reported no chest pain during the stress test.    There were no arrhythmias during stress.     Holter 7/17/19  Sinus rhythm with heart rates varying between 82 and 136 bpm with an average of 96 bpm.  There were very rare PVCs totalling 21 and averaging 0.44 per hour.  There were very rare PACs totalling 30 and averaging 0.63 per hour.  The diary was returned blank.     Carotid US 5/6/29  There is 20-39% right Internal Carotid Stenosis.  There is 0-19% left Internal Carotid Stenosis.      LE arterial doppler 5/6/19  Hemodynamically significant greater than 70% lesion in the L SFA proximally  Moderate greater than 50% plaque noted in the right SFA  Noncompressible CLEMENT bilaterally     7/3/19 HR has been  running in th 100-130 ranges even at rest at rehab  Has on atenolol previously which was switched to metoprolol after MI  Denies significant CP or SOB   sinus tachycardia - old IMI  Increase toprol XL 50 qd  Holter  OV 1 week     19 Says metoprolol gives her diarrhea - wants to go back to atenolol  Denies CP or SOB  Mild stable claudication     Admitted 19  Lorena Contreras 69 y.o. female with CAD, HTN, HLD, DM2 presents to the hospital with a chief complaint of chest pain. She reports today at 11am she developed sternal chest pain without radiation that was constant until relieved with nitro in the ED. She states it was not as severe as previous chest pain when she had her heart attack. She is pain free now. She does not experience exertional chest pain and finds she is able to mow her yard without pain. She does not need her nitro PRN at home. She denies fever, SOB, N/V, abdominal pain, dysuria, dizziness, syncope, hemoptysis.      Lorena Contreras 69 y.o. female placed in observation for chest pain. In the ED, EKG without acute ischemia, troponin negative, chest x-ray without acute process. Cardiology consulted. On 19,no chest pain overnight. LHC via Right fem art  showed :EDP 16, LAD mid 50%, Cx long mid 80-90% - diffusely diseased - similar to [facility] 3/29/19, OM1 90% mid, RCA stents patent 50% beyond stents. Post procedure, no complications and HDS. Added Imdur and continue to Brilinta/statin. Allergy to ASA. Consider add ACEI/ARBS if blood pressure tolerates. Follow up with Dr. Rico 19 Stopped imdur - worrying about reaction with hives several days after LHC  Still with angina during emotional or physical stress   Doubt she is allergic to imdur - would restart  Increase atenolol 50 bid  Will refer to Dr Monreal to review LHC for possible PCI  OV with me 3 months     21  recently  - liver failure. Reports stable exertional CP - rarely needs NTG  EKG  NSR NSSTT changes  OV 3 months with echo and lexiscan myoview for CP, CAD  Continue Rx for HTN, CAD, HLD      Went to the ER 9/24/22  Lorena Contreras is a 71 y.o. female, with a PMHx of CAD, HTN, HLD, DM, Anemia, who presents to the ED with central chest pain onset yesterday. Pt reports chest pain has been constant and is exacerbated by anxiety. Associated symptoms of nausea, diarrhea, LE edema, numbness in all fingers (onset this evening) and numbness in feet (chronic). Pt currently does not feel chest pain. No other exacerbating or alleviating factors. Patient denies cough, fever, chills, SOB, visual disturbance, paresthesia, dysuria, or other associated symptoms. This is the extent of the patient's complaints today in the Emergency Department.       11/18/22 Reports left sided CP where her port-a-cath was - feels like a pulling sensation - different from previous angina  BP poorly controlled - admits to eating too much salt  Add diovan 160 qd for HTN  Lexiscan myoview for CP  Echo with EF 75% - mild AS/MS  BMP, BNP  Continue Rx for HTN, CAD, HLD     12/5/22 CP has resolved. Denies SOb  Labs not done  BP controlled  Stress test negative for ischemia. Needs labs done    Continue Rx for HTN, CAD, HLD, DM  OV 3 months     Labs 6/22/23  K 5.2  Cr 1.5 up from 1.2     8/16/23 Diovan stopped due to itching - symptoms resolved. Now having LE edema in the evening and intermittent chest tightness - but able to work in the yard without symptoms  Diuretic stopped by PCP after recent elevated Cr  EKG NSR old IMI     Admitted 2/15/25  73-year-old female with history of diabetes, anxiety,HTN, HLD, CVA, MI, CAD s/p PCIs  CKD3b, HFpEF, and anemia admitted to ICU on 02/15/2025 for complete heart block and KYA.  Cardiology consulted- patient s/p ppm on 02/17.  Patient to start aspirin and Brilinta on 02/18.   Patient with chest pain and tachycardia overnight on 02/18. Found to have elevated troponin 1.9 and peaked at 3.3.  EKG  noted and reviewed with NO STEMI and unchanged from previous on 2/17. Device interrogated - normal function. No PMT Discussed with cardiology. S/p heart catheterization on 02/19, C indicated triple-vessel disease with patent stents and moderate LAD/RCA disease - no culprit identified . Continue medical Rx per cardiology. Nephrology consulted for KYA on CKD. Pt with severe nausea and vomiting, BUN >100. THDC placed on 02/21, and 1st HD session started on 02/21, 2nd session on 02/22 and plan for 3rd session on 02/24.  Patient was discharged home with home health to follow up with Cardiology, Nephrology and PCP.  Patient expressed concern about her heart rate which she attributed her anxiety; patient was counseled.  NC oxygen was weaned off before discharge.  Patient was instructed to come back to the hospital in the event of shortness of breaths, chest pain, or fever.  All questions were answered to her satisfaction and she verbalized understanding.     2/28/25 Still with fatigue since discharge. Denies CP or SOB      Review of Systems   Constitutional: Negative for decreased appetite.   HENT:  Negative for ear discharge.    Eyes:  Negative for blurred vision.   Respiratory:  Negative for hemoptysis.    Endocrine: Negative for polyphagia.   Hematologic/Lymphatic: Negative for adenopathy.   Skin:  Negative for color change.   Musculoskeletal:  Negative for joint swelling.   Genitourinary:  Negative for bladder incontinence.   Neurological:  Negative for brief paralysis.   Psychiatric/Behavioral:  Negative for hallucinations.    Allergic/Immunologic: Negative for hives.          Objective     Physical Exam  Constitutional:       Appearance: She is well-developed.   HENT:      Head: Normocephalic and atraumatic.   Eyes:      Conjunctiva/sclera: Conjunctivae normal.      Pupils: Pupils are equal, round, and reactive to light.   Cardiovascular:      Rate and Rhythm: Normal rate.      Pulses: Intact distal pulses.       Heart sounds: Normal heart sounds.   Pulmonary:      Effort: Pulmonary effort is normal.      Breath sounds: Normal breath sounds.   Abdominal:      General: Bowel sounds are normal.      Palpations: Abdomen is soft.   Musculoskeletal:         General: Normal range of motion.      Cervical back: Normal range of motion and neck supple.      Right lower leg: Edema present.      Left lower leg: Edema present.   Skin:     General: Skin is warm and dry.   Neurological:      Mental Status: She is alert and oriented to person, place, and time.     PPM site C/D/I       Assessment and Plan     1. Cardiac pacemaker in situ    2. CHB (complete heart block)    3. Acute kidney injury superimposed on stage 3b chronic kidney disease [N17.9, N18.32]    4. Coronary artery disease of native artery of native heart with stable angina pectoris    5. Complete heart block    6. Coronary artery disease, unspecified vessel or lesion type, unspecified whether angina present, unspecified whether native or transplanted heart    7. Mixed hyperlipidemia    8. Primary hypertension    9. Pulmonary hypertension        Plan:     Staples removed  OV with Medtronic PPM check  OV 2 months with me  Continue Rx for CHF, CAD, HTN, HLD    Advance Care Planning     Date: 02/28/2025  Patient did not wish or was not able to name a surrogate decision maker or provide an Advance Care Plan.

## 2025-03-02 ENCOUNTER — HOSPITAL ENCOUNTER (INPATIENT)
Facility: HOSPITAL | Age: 75
LOS: 9 days | Discharge: HOME HEALTH | DRG: 280 | End: 2025-03-12
Attending: EMERGENCY MEDICINE | Admitting: HOSPITALIST
Payer: MEDICARE

## 2025-03-02 DIAGNOSIS — N18.6 ESRD (END STAGE RENAL DISEASE): ICD-10-CM

## 2025-03-02 DIAGNOSIS — E87.70 HYPERVOLEMIA: ICD-10-CM

## 2025-03-02 DIAGNOSIS — E11.65 UNCONTROLLED TYPE 2 DIABETES MELLITUS WITH HYPERGLYCEMIA, WITH LONG-TERM CURRENT USE OF INSULIN: ICD-10-CM

## 2025-03-02 DIAGNOSIS — N28.9 HYPERVOLEMIA ASSOCIATED WITH RENAL INSUFFICIENCY: ICD-10-CM

## 2025-03-02 DIAGNOSIS — N17.9 AKI (ACUTE KIDNEY INJURY): ICD-10-CM

## 2025-03-02 DIAGNOSIS — E87.70 HYPERVOLEMIA ASSOCIATED WITH RENAL INSUFFICIENCY: ICD-10-CM

## 2025-03-02 DIAGNOSIS — N17.9 ACUTE KIDNEY INJURY SUPERIMPOSED ON STAGE 3B CHRONIC KIDNEY DISEASE: ICD-10-CM

## 2025-03-02 DIAGNOSIS — L89.156 PRESSURE INJURY OF DEEP TISSUE OF SACRAL REGION: ICD-10-CM

## 2025-03-02 DIAGNOSIS — Z71.89 GOALS OF CARE, COUNSELING/DISCUSSION: ICD-10-CM

## 2025-03-02 DIAGNOSIS — R06.02 SHORTNESS OF BREATH: Primary | ICD-10-CM

## 2025-03-02 DIAGNOSIS — F41.9 ANXIETY: Chronic | ICD-10-CM

## 2025-03-02 DIAGNOSIS — J90 PLEURAL EFFUSION: ICD-10-CM

## 2025-03-02 DIAGNOSIS — R07.9 CHEST PAIN: ICD-10-CM

## 2025-03-02 DIAGNOSIS — Z51.5 PALLIATIVE CARE ENCOUNTER: ICD-10-CM

## 2025-03-02 DIAGNOSIS — S32.301S: ICD-10-CM

## 2025-03-02 DIAGNOSIS — Z71.89 ADVANCED CARE PLANNING/COUNSELING DISCUSSION: ICD-10-CM

## 2025-03-02 DIAGNOSIS — N18.32 ACUTE KIDNEY INJURY SUPERIMPOSED ON STAGE 3B CHRONIC KIDNEY DISEASE: ICD-10-CM

## 2025-03-02 DIAGNOSIS — Z95.0 CARDIAC PACEMAKER IN SITU: Chronic | ICD-10-CM

## 2025-03-02 DIAGNOSIS — R53.81 PHYSICAL DEBILITY: ICD-10-CM

## 2025-03-02 DIAGNOSIS — I10 PRIMARY HYPERTENSION: ICD-10-CM

## 2025-03-02 DIAGNOSIS — Z79.4 UNCONTROLLED TYPE 2 DIABETES MELLITUS WITH HYPERGLYCEMIA, WITH LONG-TERM CURRENT USE OF INSULIN: ICD-10-CM

## 2025-03-02 LAB
ALBUMIN SERPL BCP-MCNC: 3.1 G/DL (ref 3.5–5.2)
ALP SERPL-CCNC: 146 U/L (ref 40–150)
ALT SERPL W/O P-5'-P-CCNC: 13 U/L (ref 10–44)
ANION GAP SERPL CALC-SCNC: 13 MMOL/L (ref 8–16)
AST SERPL-CCNC: 34 U/L (ref 10–40)
BASOPHILS # BLD AUTO: 0.09 K/UL (ref 0–0.2)
BASOPHILS NFR BLD: 0.6 % (ref 0–1.9)
BILIRUB SERPL-MCNC: 0.6 MG/DL (ref 0.1–1)
BNP SERPL-MCNC: 1335 PG/ML (ref 0–99)
BUN SERPL-MCNC: 19 MG/DL (ref 8–23)
CALCIUM SERPL-MCNC: 8.6 MG/DL (ref 8.7–10.5)
CHLORIDE SERPL-SCNC: 102 MMOL/L (ref 95–110)
CO2 SERPL-SCNC: 20 MMOL/L (ref 23–29)
CREAT SERPL-MCNC: 2.1 MG/DL (ref 0.5–1.4)
DIFFERENTIAL METHOD BLD: ABNORMAL
EOSINOPHIL # BLD AUTO: 0.1 K/UL (ref 0–0.5)
EOSINOPHIL NFR BLD: 0.5 % (ref 0–8)
ERYTHROCYTE [DISTWIDTH] IN BLOOD BY AUTOMATED COUNT: 15.3 % (ref 11.5–14.5)
EST. GFR  (NO RACE VARIABLE): 24.3 ML/MIN/1.73 M^2
GLUCOSE SERPL-MCNC: 194 MG/DL (ref 70–110)
HCT VFR BLD AUTO: 27.1 % (ref 37–48.5)
HGB BLD-MCNC: 8.5 G/DL (ref 12–16)
HIV 1+2 AB+HIV1 P24 AG SERPL QL IA: NORMAL
IMM GRANULOCYTES # BLD AUTO: 0.09 K/UL (ref 0–0.04)
IMM GRANULOCYTES NFR BLD AUTO: 0.6 % (ref 0–0.5)
INR PPP: 1.1 (ref 0.8–1.2)
LYMPHOCYTES # BLD AUTO: 1.2 K/UL (ref 1–4.8)
LYMPHOCYTES NFR BLD: 7.1 % (ref 18–48)
MCH RBC QN AUTO: 28.3 PG (ref 27–31)
MCHC RBC AUTO-ENTMCNC: 31.4 G/DL (ref 32–36)
MCV RBC AUTO: 90 FL (ref 82–98)
MONOCYTES # BLD AUTO: 1.2 K/UL (ref 0.3–1)
MONOCYTES NFR BLD: 7.2 % (ref 4–15)
NEUTROPHILS # BLD AUTO: 13.7 K/UL (ref 1.8–7.7)
NEUTROPHILS NFR BLD: 84 % (ref 38–73)
NRBC BLD-RTO: 0 /100 WBC
PLATELET # BLD AUTO: 287 K/UL (ref 150–450)
PMV BLD AUTO: 11.9 FL (ref 9.2–12.9)
POTASSIUM SERPL-SCNC: 3.5 MMOL/L (ref 3.5–5.1)
PROT SERPL-MCNC: 7 G/DL (ref 6–8.4)
PROTHROMBIN TIME: 11.6 SEC (ref 9–12.5)
RBC # BLD AUTO: 3 M/UL (ref 4–5.4)
SODIUM SERPL-SCNC: 135 MMOL/L (ref 136–145)
TROPONIN I SERPL DL<=0.01 NG/ML-MCNC: 57 NG/L (ref 0–14)
TROPONIN I SERPL DL<=0.01 NG/ML-MCNC: 64 NG/L (ref 0–14)
WBC # BLD AUTO: 16.29 K/UL (ref 3.9–12.7)

## 2025-03-02 PROCEDURE — 83880 ASSAY OF NATRIURETIC PEPTIDE: CPT | Mod: HCNC | Performed by: EMERGENCY MEDICINE

## 2025-03-02 PROCEDURE — 84484 ASSAY OF TROPONIN QUANT: CPT | Mod: 91,HCNC | Performed by: EMERGENCY MEDICINE

## 2025-03-02 PROCEDURE — 87389 HIV-1 AG W/HIV-1&-2 AB AG IA: CPT | Mod: HCNC | Performed by: EMERGENCY MEDICINE

## 2025-03-02 PROCEDURE — 94761 N-INVAS EAR/PLS OXIMETRY MLT: CPT | Mod: HCNC

## 2025-03-02 PROCEDURE — 99285 EMERGENCY DEPT VISIT HI MDM: CPT | Mod: 25,HCNC

## 2025-03-02 PROCEDURE — 85610 PROTHROMBIN TIME: CPT | Mod: HCNC | Performed by: EMERGENCY MEDICINE

## 2025-03-02 PROCEDURE — 80053 COMPREHEN METABOLIC PANEL: CPT | Mod: HCNC | Performed by: EMERGENCY MEDICINE

## 2025-03-02 PROCEDURE — 93005 ELECTROCARDIOGRAM TRACING: CPT | Mod: HCNC

## 2025-03-02 PROCEDURE — 25000003 PHARM REV CODE 250: Mod: HCNC

## 2025-03-02 PROCEDURE — 85025 COMPLETE CBC W/AUTO DIFF WBC: CPT | Mod: HCNC | Performed by: EMERGENCY MEDICINE

## 2025-03-02 PROCEDURE — 93010 ELECTROCARDIOGRAM REPORT: CPT | Mod: HCNC,,, | Performed by: STUDENT IN AN ORGANIZED HEALTH CARE EDUCATION/TRAINING PROGRAM

## 2025-03-02 RX ORDER — ACETAMINOPHEN 325 MG/1
650 TABLET ORAL
Status: COMPLETED | OUTPATIENT
Start: 2025-03-02 | End: 2025-03-02

## 2025-03-02 RX ADMIN — ACETAMINOPHEN 650 MG: 325 TABLET ORAL at 11:03

## 2025-03-02 NOTE — Clinical Note
Diagnosis: Chest pain [194396]   Future Attending Provider: TEMO VEGA [[id number]]   Is the patient being sent to ED Observation?: No

## 2025-03-02 NOTE — TELEPHONE ENCOUNTER
No care due was identified.  [facility] Embedded Care Due Messages. Reference number: [id number].   3/02/2025 9:51:21 AM CST

## 2025-03-03 ENCOUNTER — PATIENT OUTREACH (OUTPATIENT)
Facility: OTHER | Age: 75
End: 2025-03-03
Payer: MEDICARE

## 2025-03-03 ENCOUNTER — PATIENT OUTREACH (OUTPATIENT)
Dept: ADMINISTRATIVE | Facility: HOSPITAL | Age: 75
End: 2025-03-03
Payer: MEDICARE

## 2025-03-03 PROBLEM — N18.32 TYPE 2 DIABETES MELLITUS WITH STAGE 3B CHRONIC KIDNEY DISEASE, WITH LONG-TERM CURRENT USE OF INSULIN: Chronic | Status: ACTIVE | Noted: 2023-08-24

## 2025-03-03 PROBLEM — I25.10 CORONARY ARTERY DISEASE: Chronic | Status: ACTIVE | Noted: 2024-10-30

## 2025-03-03 PROBLEM — D64.9 ANEMIA: Chronic | Status: ACTIVE | Noted: 2024-10-30

## 2025-03-03 PROBLEM — K59.00 CONSTIPATION: Chronic | Status: ACTIVE | Noted: 2024-08-18

## 2025-03-03 PROBLEM — J42 CHRONIC BRONCHITIS, UNSPECIFIED CHRONIC BRONCHITIS TYPE: Chronic | Status: ACTIVE | Noted: 2024-05-28

## 2025-03-03 PROBLEM — N28.9 HYPERVOLEMIA ASSOCIATED WITH RENAL INSUFFICIENCY: Status: ACTIVE | Noted: 2025-03-03

## 2025-03-03 PROBLEM — E11.3293 MILD NONPROLIFERATIVE DIABETIC RETINOPATHY OF BOTH EYES ASSOCIATED WITH TYPE 2 DIABETES MELLITUS: Chronic | Status: ACTIVE | Noted: 2017-03-07

## 2025-03-03 PROBLEM — E87.70 HYPERVOLEMIA ASSOCIATED WITH RENAL INSUFFICIENCY: Status: ACTIVE | Noted: 2025-03-03

## 2025-03-03 PROBLEM — Z95.0 CARDIAC PACEMAKER IN SITU: Chronic | Status: ACTIVE | Noted: 2025-02-17

## 2025-03-03 PROBLEM — E11.22 TYPE 2 DIABETES MELLITUS WITH STAGE 3B CHRONIC KIDNEY DISEASE, WITH LONG-TERM CURRENT USE OF INSULIN: Chronic | Status: ACTIVE | Noted: 2023-08-24

## 2025-03-03 PROBLEM — E11.51 PERIPHERAL VASCULAR DISEASE IN DIABETES MELLITUS: Chronic | Status: ACTIVE | Noted: 2020-11-07

## 2025-03-03 PROBLEM — N18.32 STAGE 3B CHRONIC KIDNEY DISEASE: Chronic | Status: ACTIVE | Noted: 2024-05-28

## 2025-03-03 PROBLEM — K21.9 GERD (GASTROESOPHAGEAL REFLUX DISEASE): Status: ACTIVE | Noted: 2025-03-03

## 2025-03-03 PROBLEM — I27.20 PULMONARY HYPERTENSION: Chronic | Status: ACTIVE | Noted: 2020-02-06

## 2025-03-03 PROBLEM — A04.72 C. DIFFICILE COLITIS: Status: RESOLVED | Noted: 2024-09-29 | Resolved: 2025-03-03

## 2025-03-03 PROBLEM — I44.2 CHB (COMPLETE HEART BLOCK): Chronic | Status: ACTIVE | Noted: 2025-02-15

## 2025-03-03 PROBLEM — Z79.4 TYPE 2 DIABETES MELLITUS WITH STAGE 3B CHRONIC KIDNEY DISEASE, WITH LONG-TERM CURRENT USE OF INSULIN: Chronic | Status: ACTIVE | Noted: 2023-08-24

## 2025-03-03 LAB
25(OH)D3+25(OH)D2 SERPL-MCNC: 27 NG/ML (ref 30–96)
ALBUMIN SERPL BCP-MCNC: 2.8 G/DL (ref 3.5–5.2)
ALP SERPL-CCNC: 129 U/L (ref 40–150)
ALT SERPL W/O P-5'-P-CCNC: 12 U/L (ref 10–44)
ANION GAP SERPL CALC-SCNC: 7 MMOL/L (ref 8–16)
AST SERPL-CCNC: 28 U/L (ref 10–40)
AV AREA BY CONTINUOUS VTI: 0.7 CM2
AV INDEX (PROSTH): 0.23
AV LVOT MEAN GRADIENT: 2 MMHG
AV LVOT PEAK GRADIENT: 3 MMHG
AV MEAN GRADIENT: 40 MMHG
AV PEAK GRADIENT: 71 MMHG
AV VALVE AREA BY VELOCITY RATIO: 0.7 CM²
AV VALVE AREA: 0.7 CM2
AV VELOCITY RATIO: 0.21
BACTERIA #/AREA URNS AUTO: ABNORMAL /HPF
BASOPHILS # BLD AUTO: 0.04 K/UL (ref 0–0.2)
BASOPHILS NFR BLD: 0.4 % (ref 0–1.9)
BILIRUB SERPL-MCNC: 0.6 MG/DL (ref 0.1–1)
BILIRUB UR QL STRIP: NEGATIVE
BSA FOR ECHO PROCEDURE: 2.04 M2
BUN SERPL-MCNC: 20 MG/DL (ref 8–23)
CALCIUM SERPL-MCNC: 8.2 MG/DL (ref 8.7–10.5)
CHLORIDE SERPL-SCNC: 105 MMOL/L (ref 95–110)
CLARITY UR REFRACT.AUTO: CLEAR
CO2 SERPL-SCNC: 23 MMOL/L (ref 23–29)
COLOR UR AUTO: YELLOW
CREAT SERPL-MCNC: 1.9 MG/DL (ref 0.5–1.4)
CREAT UR-MCNC: 94 MG/DL (ref 15–325)
CRP SERPL-MCNC: 17.9 MG/L (ref 0–8.2)
CV ECHO LV RWT: 0.59 CM
DIFFERENTIAL METHOD BLD: ABNORMAL
DOP CALC AO PEAK VEL: 4.2 M/S
DOP CALC AO VTI: 87 CM
DOP CALC LVOT AREA: 3.1 CM2
DOP CALC LVOT DIAMETER: 2 CM
DOP CALC LVOT PEAK VEL: 0.9 M/S
DOP CALC LVOT STROKE VOLUME: 61.5 CM3
DOP CALCLVOT PEAK VEL VTI: 19.6 CM
E/E' RATIO: 26 M/S
ECHO EF ESTIMATED: 55 %
ECHO LV POSTERIOR WALL: 1.2 CM (ref 0.6–1.1)
EOSINOPHIL # BLD AUTO: 0.1 K/UL (ref 0–0.5)
EOSINOPHIL NFR BLD: 0.8 % (ref 0–8)
ERYTHROCYTE [DISTWIDTH] IN BLOOD BY AUTOMATED COUNT: 15.3 % (ref 11.5–14.5)
EST. GFR  (NO RACE VARIABLE): 27.4 ML/MIN/1.73 M^2
FRACTIONAL SHORTENING: 29.3 % (ref 28–44)
GLUCOSE SERPL-MCNC: 123 MG/DL (ref 70–110)
GLUCOSE UR QL STRIP: NEGATIVE
HCT VFR BLD AUTO: 24.4 % (ref 37–48.5)
HGB BLD-MCNC: 7.5 G/DL (ref 12–16)
HGB UR QL STRIP: NEGATIVE
HR MV ECHO: 89 BPM
HYALINE CASTS UR QL AUTO: 7 /LPF
IMM GRANULOCYTES # BLD AUTO: 0.05 K/UL (ref 0–0.04)
IMM GRANULOCYTES NFR BLD AUTO: 0.4 % (ref 0–0.5)
INTERVENTRICULAR SEPTUM: 1.1 CM (ref 0.6–1.1)
KETONES UR QL STRIP: NEGATIVE
LA MAJOR: 6.7 CM
LA MINOR: 6.3 CM
LA WIDTH: 5 CM
LACTATE SERPL-SCNC: 0.7 MMOL/L (ref 0.5–2.2)
LEFT ATRIUM SIZE: 4.1 CM
LEFT ATRIUM VOLUME INDEX: 56 ML/M2
LEFT ATRIUM VOLUME: 113 CM3
LEFT INTERNAL DIMENSION IN SYSTOLE: 2.9 CM (ref 2.1–4)
LEFT VENTRICLE DIASTOLIC VOLUME INDEX: 35.64 ML/M2
LEFT VENTRICLE DIASTOLIC VOLUME: 72 ML
LEFT VENTRICLE MASS INDEX: 79.9 G/M2
LEFT VENTRICLE SYSTOLIC VOLUME INDEX: 15.8 ML/M2
LEFT VENTRICLE SYSTOLIC VOLUME: 32 ML
LEFT VENTRICULAR INTERNAL DIMENSION IN DIASTOLE: 4.1 CM (ref 3.5–6)
LEFT VENTRICULAR MASS: 161.4 G
LEUKOCYTE ESTERASE UR QL STRIP: ABNORMAL
LV LATERAL E/E' RATIO: 36.6 M/S
LV SEPTAL E/E' RATIO: 20.3 M/S
LYMPHOCYTES # BLD AUTO: 1.7 K/UL (ref 1–4.8)
LYMPHOCYTES NFR BLD: 15.4 % (ref 18–48)
MAGNESIUM SERPL-MCNC: 2 MG/DL (ref 1.6–2.6)
MCH RBC QN AUTO: 28.3 PG (ref 27–31)
MCHC RBC AUTO-ENTMCNC: 30.7 G/DL (ref 32–36)
MCV RBC AUTO: 92 FL (ref 82–98)
MICROSCOPIC COMMENT: ABNORMAL
MONOCYTES # BLD AUTO: 0.8 K/UL (ref 0.3–1)
MONOCYTES NFR BLD: 7.1 % (ref 4–15)
MV MEAN GRADIENT: 6 MMHG
MV PEAK E VEL: 1.83 M/S
NEUTROPHILS # BLD AUTO: 8.5 K/UL (ref 1.8–7.7)
NEUTROPHILS NFR BLD: 75.9 % (ref 38–73)
NITRITE UR QL STRIP: NEGATIVE
NRBC BLD-RTO: 0 /100 WBC
OHS CV RV/LV RATIO: 0.76 CM
OHS LV EJECTION FRACTION SIMPSONS BIPLANE MOD: 50 %
OHS QRS DURATION: 166 MS
OHS QRS DURATION: 170 MS
OHS QTC CALCULATION: 525 MS
OHS QTC CALCULATION: 555 MS
PH UR STRIP: 6 [PH] (ref 5–8)
PHOSPHATE SERPL-MCNC: 2.7 MG/DL (ref 2.7–4.5)
PISA TR MAX VEL: 3.8 M/S
PLATELET # BLD AUTO: 251 K/UL (ref 150–450)
PMV BLD AUTO: 11.8 FL (ref 9.2–12.9)
POCT GLUCOSE: 141 MG/DL (ref 70–110)
POCT GLUCOSE: 149 MG/DL (ref 70–110)
POTASSIUM SERPL-SCNC: 3.2 MMOL/L (ref 3.5–5.1)
PROCALCITONIN SERPL IA-MCNC: 0.16 NG/ML
PROT SERPL-MCNC: 6.3 G/DL (ref 6–8.4)
PROT UR QL STRIP: ABNORMAL
PROT UR-MCNC: 134 MG/DL (ref 0–15)
PROT/CREAT UR: 1.43 MG/G{CREAT} (ref 0–0.2)
RA MAJOR: 5.87 CM
RA PRESSURE ESTIMATED: 15 MMHG
RA WIDTH: 3.32 CM
RBC # BLD AUTO: 2.65 M/UL (ref 4–5.4)
RBC #/AREA URNS AUTO: 1 /HPF (ref 0–4)
RIGHT VENTRICLE DIASTOLIC BASEL DIMENSION: 3.1 CM
RV TB RVSP: 19 MMHG
SINUS: 2.46 CM
SODIUM SERPL-SCNC: 135 MMOL/L (ref 136–145)
SODIUM UR-SCNC: 48 MMOL/L (ref 20–250)
SP GR UR STRIP: 1.01 (ref 1–1.03)
SQUAMOUS #/AREA URNS AUTO: 2 /HPF
STJ: 2.02 CM
TDI LATERAL: 0.05 M/S
TDI SEPTAL: 0.09 M/S
TDI: 0.07 M/S
TRICUSPID ANNULAR PLANE SYSTOLIC EXCURSION: 1.59 CM
TV PEAK GRADIENT: 58 MMHG
TV REST PULMONARY ARTERY PRESSURE: 73 MMHG
URN SPEC COLLECT METH UR: ABNORMAL
WBC # BLD AUTO: 11.13 K/UL (ref 3.9–12.7)
WBC #/AREA URNS AUTO: 1 /HPF (ref 0–5)
Z-SCORE OF LEFT VENTRICULAR DIMENSION IN END DIASTOLE: -3.73
Z-SCORE OF LEFT VENTRICULAR DIMENSION IN END SYSTOLE: -1.82

## 2025-03-03 PROCEDURE — 63600175 PHARM REV CODE 636 W HCPCS: Mod: HCNC

## 2025-03-03 PROCEDURE — 25000003 PHARM REV CODE 250: Mod: HCNC | Performed by: NURSE PRACTITIONER

## 2025-03-03 PROCEDURE — 86140 C-REACTIVE PROTEIN: CPT | Mod: HCNC | Performed by: NURSE PRACTITIONER

## 2025-03-03 PROCEDURE — 63600175 PHARM REV CODE 636 W HCPCS: Mod: HCNC | Performed by: NURSE PRACTITIONER

## 2025-03-03 PROCEDURE — 82570 ASSAY OF URINE CREATININE: CPT | Mod: HCNC | Performed by: NURSE PRACTITIONER

## 2025-03-03 PROCEDURE — 25000242 PHARM REV CODE 250 ALT 637 W/ HCPCS: Mod: HCNC | Performed by: NURSE PRACTITIONER

## 2025-03-03 PROCEDURE — 21400001 HC TELEMETRY ROOM: Mod: HCNC

## 2025-03-03 PROCEDURE — 80053 COMPREHEN METABOLIC PANEL: CPT | Mod: HCNC | Performed by: HOSPITALIST

## 2025-03-03 PROCEDURE — 83605 ASSAY OF LACTIC ACID: CPT | Mod: HCNC | Performed by: NURSE PRACTITIONER

## 2025-03-03 PROCEDURE — 25000003 PHARM REV CODE 250: Mod: HCNC | Performed by: HOSPITALIST

## 2025-03-03 PROCEDURE — 81001 URINALYSIS AUTO W/SCOPE: CPT | Mod: HCNC | Performed by: NURSE PRACTITIONER

## 2025-03-03 PROCEDURE — 51798 US URINE CAPACITY MEASURE: CPT | Mod: HCNC

## 2025-03-03 PROCEDURE — 63600175 PHARM REV CODE 636 W HCPCS: Mod: JZ,TB,HCNC | Performed by: STUDENT IN AN ORGANIZED HEALTH CARE EDUCATION/TRAINING PROGRAM

## 2025-03-03 PROCEDURE — 84100 ASSAY OF PHOSPHORUS: CPT | Mod: HCNC | Performed by: HOSPITALIST

## 2025-03-03 PROCEDURE — 84145 PROCALCITONIN (PCT): CPT | Mod: HCNC | Performed by: NURSE PRACTITIONER

## 2025-03-03 PROCEDURE — 84300 ASSAY OF URINE SODIUM: CPT | Mod: HCNC | Performed by: NURSE PRACTITIONER

## 2025-03-03 PROCEDURE — 83735 ASSAY OF MAGNESIUM: CPT | Mod: HCNC | Performed by: HOSPITALIST

## 2025-03-03 PROCEDURE — 82306 VITAMIN D 25 HYDROXY: CPT | Mod: HCNC | Performed by: STUDENT IN AN ORGANIZED HEALTH CARE EDUCATION/TRAINING PROGRAM

## 2025-03-03 PROCEDURE — 99223 1ST HOSP IP/OBS HIGH 75: CPT | Mod: HCNC,GC,, | Performed by: STUDENT IN AN ORGANIZED HEALTH CARE EDUCATION/TRAINING PROGRAM

## 2025-03-03 PROCEDURE — 85025 COMPLETE CBC W/AUTO DIFF WBC: CPT | Mod: HCNC | Performed by: NURSE PRACTITIONER

## 2025-03-03 PROCEDURE — 96372 THER/PROPH/DIAG INJ SC/IM: CPT | Performed by: NURSE PRACTITIONER

## 2025-03-03 RX ORDER — FLUOXETINE HYDROCHLORIDE 20 MG/1
40 CAPSULE ORAL DAILY
Status: DISCONTINUED | OUTPATIENT
Start: 2025-03-03 | End: 2025-03-12 | Stop reason: HOSPADM

## 2025-03-03 RX ORDER — GLYCERIN 1 G/1
1 SUPPOSITORY RECTAL ONCE
Status: DISCONTINUED | OUTPATIENT
Start: 2025-03-03 | End: 2025-03-03

## 2025-03-03 RX ORDER — IBUPROFEN 200 MG
24 TABLET ORAL
Status: DISCONTINUED | OUTPATIENT
Start: 2025-03-03 | End: 2025-03-12 | Stop reason: HOSPADM

## 2025-03-03 RX ORDER — IBUPROFEN 200 MG
16 TABLET ORAL
Status: DISCONTINUED | OUTPATIENT
Start: 2025-03-03 | End: 2025-03-12 | Stop reason: HOSPADM

## 2025-03-03 RX ORDER — HYDRALAZINE HYDROCHLORIDE 25 MG/1
100 TABLET, FILM COATED ORAL EVERY 8 HOURS
Status: DISCONTINUED | OUTPATIENT
Start: 2025-03-03 | End: 2025-03-03

## 2025-03-03 RX ORDER — QUETIAPINE FUMARATE 25 MG/1
25 TABLET, FILM COATED ORAL NIGHTLY PRN
Status: DISCONTINUED | OUTPATIENT
Start: 2025-03-03 | End: 2025-03-12 | Stop reason: HOSPADM

## 2025-03-03 RX ORDER — ACETAMINOPHEN 325 MG/1
650 TABLET ORAL EVERY 6 HOURS PRN
Status: DISCONTINUED | OUTPATIENT
Start: 2025-03-03 | End: 2025-03-12 | Stop reason: HOSPADM

## 2025-03-03 RX ORDER — AMOXICILLIN 250 MG
1 CAPSULE ORAL 2 TIMES DAILY
Status: DISCONTINUED | OUTPATIENT
Start: 2025-03-03 | End: 2025-03-05

## 2025-03-03 RX ORDER — NAPROXEN SODIUM 220 MG/1
81 TABLET, FILM COATED ORAL DAILY
Status: DISCONTINUED | OUTPATIENT
Start: 2025-03-03 | End: 2025-03-12 | Stop reason: HOSPADM

## 2025-03-03 RX ORDER — TALC
6 POWDER (GRAM) TOPICAL NIGHTLY PRN
Status: DISCONTINUED | OUTPATIENT
Start: 2025-03-03 | End: 2025-03-12 | Stop reason: HOSPADM

## 2025-03-03 RX ORDER — POLYETHYLENE GLYCOL 3350 17 G/17G
17 POWDER, FOR SOLUTION ORAL DAILY
Status: DISCONTINUED | OUTPATIENT
Start: 2025-03-03 | End: 2025-03-03

## 2025-03-03 RX ORDER — ONDANSETRON HYDROCHLORIDE 2 MG/ML
4 INJECTION, SOLUTION INTRAVENOUS EVERY 8 HOURS PRN
Status: DISCONTINUED | OUTPATIENT
Start: 2025-03-03 | End: 2025-03-12 | Stop reason: HOSPADM

## 2025-03-03 RX ORDER — FUROSEMIDE 10 MG/ML
80 INJECTION INTRAMUSCULAR; INTRAVENOUS ONCE
Status: DISCONTINUED | OUTPATIENT
Start: 2025-03-03 | End: 2025-03-03

## 2025-03-03 RX ORDER — INSULIN ASPART 100 [IU]/ML
0-5 INJECTION, SOLUTION INTRAVENOUS; SUBCUTANEOUS
Status: DISCONTINUED | OUTPATIENT
Start: 2025-03-03 | End: 2025-03-12 | Stop reason: HOSPADM

## 2025-03-03 RX ORDER — ATORVASTATIN CALCIUM 40 MG/1
80 TABLET, FILM COATED ORAL NIGHTLY
Status: DISCONTINUED | OUTPATIENT
Start: 2025-03-03 | End: 2025-03-12 | Stop reason: HOSPADM

## 2025-03-03 RX ORDER — MULTIVITAMIN
1 TABLET ORAL DAILY
COMMUNITY

## 2025-03-03 RX ORDER — HYDROXYZINE HYDROCHLORIDE 25 MG/1
25 TABLET, FILM COATED ORAL 3 TIMES DAILY PRN
Status: DISCONTINUED | OUTPATIENT
Start: 2025-03-03 | End: 2025-03-12 | Stop reason: HOSPADM

## 2025-03-03 RX ORDER — BISACODYL 10 MG/1
10 SUPPOSITORY RECTAL DAILY PRN
Status: DISCONTINUED | OUTPATIENT
Start: 2025-03-03 | End: 2025-03-12 | Stop reason: HOSPADM

## 2025-03-03 RX ORDER — ISOSORBIDE MONONITRATE 60 MG/1
60 TABLET, EXTENDED RELEASE ORAL DAILY
Status: DISCONTINUED | OUTPATIENT
Start: 2025-03-03 | End: 2025-03-12 | Stop reason: HOSPADM

## 2025-03-03 RX ORDER — HEPARIN SODIUM 5000 [USP'U]/ML
5000 INJECTION, SOLUTION INTRAVENOUS; SUBCUTANEOUS EVERY 8 HOURS
Status: DISCONTINUED | OUTPATIENT
Start: 2025-03-03 | End: 2025-03-12 | Stop reason: HOSPADM

## 2025-03-03 RX ORDER — METOPROLOL SUCCINATE 25 MG/1
25 TABLET, EXTENDED RELEASE ORAL DAILY
Status: DISCONTINUED | OUTPATIENT
Start: 2025-03-03 | End: 2025-03-12 | Stop reason: HOSPADM

## 2025-03-03 RX ORDER — POLYETHYLENE GLYCOL 3350 17 G/17G
17 POWDER, FOR SOLUTION ORAL 2 TIMES DAILY
Status: DISCONTINUED | OUTPATIENT
Start: 2025-03-03 | End: 2025-03-05

## 2025-03-03 RX ORDER — OXYCODONE HYDROCHLORIDE 5 MG/1
5 TABLET ORAL EVERY 8 HOURS PRN
Refills: 0 | Status: DISCONTINUED | OUTPATIENT
Start: 2025-03-03 | End: 2025-03-03

## 2025-03-03 RX ORDER — NALOXONE HCL 0.4 MG/ML
0.02 VIAL (ML) INJECTION
Status: DISCONTINUED | OUTPATIENT
Start: 2025-03-03 | End: 2025-03-12 | Stop reason: HOSPADM

## 2025-03-03 RX ORDER — FUROSEMIDE 10 MG/ML
80 INJECTION INTRAMUSCULAR; INTRAVENOUS EVERY 12 HOURS
Status: DISCONTINUED | OUTPATIENT
Start: 2025-03-03 | End: 2025-03-05

## 2025-03-03 RX ORDER — SODIUM CHLORIDE 9 MG/ML
INJECTION, SOLUTION INTRAVENOUS
Status: CANCELLED | OUTPATIENT
Start: 2025-03-03

## 2025-03-03 RX ORDER — INSULIN GLARGINE 100 [IU]/ML
5 INJECTION, SOLUTION SUBCUTANEOUS NIGHTLY
Status: DISCONTINUED | OUTPATIENT
Start: 2025-03-03 | End: 2025-03-12 | Stop reason: HOSPADM

## 2025-03-03 RX ORDER — ALBUTEROL SULFATE 90 UG/1
2 INHALANT RESPIRATORY (INHALATION) EVERY 6 HOURS PRN
Status: DISCONTINUED | OUTPATIENT
Start: 2025-03-03 | End: 2025-03-12 | Stop reason: HOSPADM

## 2025-03-03 RX ORDER — CEPHALEXIN 500 MG/1
500 CAPSULE ORAL EVERY 8 HOURS
Status: COMPLETED | OUTPATIENT
Start: 2025-03-03 | End: 2025-03-03

## 2025-03-03 RX ORDER — PANTOPRAZOLE SODIUM 40 MG/1
40 TABLET, DELAYED RELEASE ORAL DAILY
Status: DISCONTINUED | OUTPATIENT
Start: 2025-03-03 | End: 2025-03-12 | Stop reason: HOSPADM

## 2025-03-03 RX ORDER — PROCHLORPERAZINE EDISYLATE 5 MG/ML
5 INJECTION INTRAMUSCULAR; INTRAVENOUS EVERY 6 HOURS PRN
Status: DISCONTINUED | OUTPATIENT
Start: 2025-03-03 | End: 2025-03-03

## 2025-03-03 RX ORDER — SODIUM CHLORIDE 0.9 % (FLUSH) 0.9 %
10 SYRINGE (ML) INJECTION EVERY 12 HOURS PRN
Status: DISCONTINUED | OUTPATIENT
Start: 2025-03-03 | End: 2025-03-12 | Stop reason: HOSPADM

## 2025-03-03 RX ORDER — OXYCODONE HYDROCHLORIDE 10 MG/1
10 TABLET ORAL EVERY 12 HOURS PRN
Refills: 0 | Status: DISCONTINUED | OUTPATIENT
Start: 2025-03-03 | End: 2025-03-07

## 2025-03-03 RX ORDER — OXYCODONE HYDROCHLORIDE 5 MG/1
5 TABLET ORAL EVERY 12 HOURS PRN
Refills: 0 | Status: DISCONTINUED | OUTPATIENT
Start: 2025-03-03 | End: 2025-03-03

## 2025-03-03 RX ORDER — HYDRALAZINE HYDROCHLORIDE 50 MG/1
50 TABLET, FILM COATED ORAL EVERY 8 HOURS
Status: DISCONTINUED | OUTPATIENT
Start: 2025-03-03 | End: 2025-03-12 | Stop reason: HOSPADM

## 2025-03-03 RX ORDER — GLUCAGON 1 MG
1 KIT INJECTION
Status: DISCONTINUED | OUTPATIENT
Start: 2025-03-03 | End: 2025-03-12 | Stop reason: HOSPADM

## 2025-03-03 RX ORDER — FUROSEMIDE 10 MG/ML
100 INJECTION INTRAMUSCULAR; INTRAVENOUS
Status: COMPLETED | OUTPATIENT
Start: 2025-03-03 | End: 2025-03-03

## 2025-03-03 RX ORDER — HYDRALAZINE HYDROCHLORIDE 25 MG/1
75 TABLET, FILM COATED ORAL EVERY 8 HOURS
Status: DISCONTINUED | OUTPATIENT
Start: 2025-03-03 | End: 2025-03-03

## 2025-03-03 RX ORDER — SODIUM CHLORIDE 9 MG/ML
INJECTION, SOLUTION INTRAVENOUS ONCE
Status: CANCELLED | OUTPATIENT
Start: 2025-03-03 | End: 2025-03-03

## 2025-03-03 RX ADMIN — Medication 6 MG: at 01:03

## 2025-03-03 RX ADMIN — BISACODYL 10 MG: 10 SUPPOSITORY RECTAL at 04:03

## 2025-03-03 RX ADMIN — METOPROLOL SUCCINATE 25 MG: 25 TABLET, EXTENDED RELEASE ORAL at 09:03

## 2025-03-03 RX ADMIN — Medication 1 ENEMA: at 06:03

## 2025-03-03 RX ADMIN — FUROSEMIDE 100 MG: 10 INJECTION, SOLUTION INTRAMUSCULAR; INTRAVENOUS at 01:03

## 2025-03-03 RX ADMIN — HEPARIN SODIUM 5000 UNITS: 5000 INJECTION INTRAVENOUS; SUBCUTANEOUS at 09:03

## 2025-03-03 RX ADMIN — HYDRALAZINE HYDROCHLORIDE 50 MG: 25 TABLET ORAL at 05:03

## 2025-03-03 RX ADMIN — PANTOPRAZOLE SODIUM 40 MG: 40 TABLET, DELAYED RELEASE ORAL at 09:03

## 2025-03-03 RX ADMIN — TICAGRELOR 60 MG: 60 TABLET ORAL at 09:03

## 2025-03-03 RX ADMIN — INSULIN GLARGINE 5 UNITS: 100 INJECTION, SOLUTION SUBCUTANEOUS at 09:03

## 2025-03-03 RX ADMIN — OXYCODONE HYDROCHLORIDE 10 MG: 10 TABLET ORAL at 12:03

## 2025-03-03 RX ADMIN — HEPARIN SODIUM 5000 UNITS: 5000 INJECTION INTRAVENOUS; SUBCUTANEOUS at 05:03

## 2025-03-03 RX ADMIN — HEPARIN SODIUM 5000 UNITS: 5000 INJECTION INTRAVENOUS; SUBCUTANEOUS at 02:03

## 2025-03-03 RX ADMIN — FUROSEMIDE 80 MG: 10 INJECTION, SOLUTION INTRAVENOUS at 08:03

## 2025-03-03 RX ADMIN — ASPIRIN 81 MG CHEWABLE TABLET 81 MG: 81 TABLET CHEWABLE at 09:03

## 2025-03-03 RX ADMIN — OXYCODONE HYDROCHLORIDE 10 MG: 10 TABLET ORAL at 08:03

## 2025-03-03 RX ADMIN — CEPHALEXIN 500 MG: 500 CAPSULE ORAL at 02:03

## 2025-03-03 RX ADMIN — QUETIAPINE FUMARATE 25 MG: 25 TABLET ORAL at 08:03

## 2025-03-03 RX ADMIN — SENNOSIDES AND DOCUSATE SODIUM 1 TABLET: 50; 8.6 TABLET ORAL at 09:03

## 2025-03-03 RX ADMIN — CEPHALEXIN 500 MG: 500 CAPSULE ORAL at 05:03

## 2025-03-03 RX ADMIN — OXYCODONE 5 MG: 5 TABLET ORAL at 05:03

## 2025-03-03 RX ADMIN — ATORVASTATIN CALCIUM 80 MG: 40 TABLET, FILM COATED ORAL at 08:03

## 2025-03-03 RX ADMIN — CEPHALEXIN 500 MG: 500 CAPSULE ORAL at 09:03

## 2025-03-03 RX ADMIN — SENNOSIDES AND DOCUSATE SODIUM 1 TABLET: 50; 8.6 TABLET ORAL at 08:03

## 2025-03-03 RX ADMIN — FUROSEMIDE 80 MG: 10 INJECTION, SOLUTION INTRAVENOUS at 07:03

## 2025-03-03 RX ADMIN — HYDRALAZINE HYDROCHLORIDE 50 MG: 25 TABLET ORAL at 09:03

## 2025-03-03 RX ADMIN — ATORVASTATIN CALCIUM 80 MG: 40 TABLET, FILM COATED ORAL at 01:03

## 2025-03-03 RX ADMIN — FLUOXETINE HYDROCHLORIDE 40 MG: 20 CAPSULE ORAL at 09:03

## 2025-03-03 RX ADMIN — POLYETHYLENE GLYCOL 3350 17 G: 17 POWDER, FOR SOLUTION ORAL at 09:03

## 2025-03-03 RX ADMIN — POLYETHYLENE GLYCOL 3350 17 G: 17 POWDER, FOR SOLUTION ORAL at 08:03

## 2025-03-03 RX ADMIN — EPOETIN ALFA-EPBX 4000 UNITS: 4000 INJECTION, SOLUTION INTRAVENOUS; SUBCUTANEOUS at 04:03

## 2025-03-03 RX ADMIN — ISOSORBIDE MONONITRATE 60 MG: 60 TABLET, EXTENDED RELEASE ORAL at 09:03

## 2025-03-03 RX ADMIN — HYDRALAZINE HYDROCHLORIDE 50 MG: 25 TABLET ORAL at 02:03

## 2025-03-03 NOTE — PHARMACY MED REC
"Admission Medication History     The home medication history was taken by Sandie Johnson.    You may go to "Admission" then "Reconcile Home Medications" tabs to review and/or act upon these items.     The home medication list has been updated by the Pharmacy department.   Please read ALL comments highlighted in yellow.   Please address this information as you see fit.    Feel free to contact us if you have any questions or require assistance.      The medications listed below were removed from the home medication list. Please reorder if appropriate:  Patient reports no longer taking the following medication(s):  CEPHALEXIN 500 MG  TOBRAMYCIN-DEXAMETHASONE 0.3-01 % DRPS    Medications listed below were obtained from: Patient/family and Analytic software- [employer]  Current Outpatient Medications on File Prior to Encounter   Medication Sig    albuterol (PROVENTIL/VENTOLIN HFA) 90 mcg/actuation inhaler Inhale 2 puffs into the lungs every 6 (six) hours as needed for Wheezing or Shortness of Breath.    aluminum & magnesium hydroxide-simethicone (MYLANTA MAX STRENGTH) 400-400-40 mg/5 mL suspension Take 30 mLs by mouth every 6 (six) hours as needed for Indigestion.    aspirin 81 MG Chew Take 1 tablet (81 mg total) by mouth once daily. Hold to avoid bleed risk on triple therapy and then resume on oct 27 once eliquis stops on oct 26th    atorvastatin (LIPITOR) 80 MG tablet Take 1 tablet (80 mg total) by mouth every evening.    DEXCOM G7  Misc Use 1  to track blood glucose, ICD10: E11.65    DEXCOM G7 SENSOR Samina Use 1 sensor every 10 days to track blood glucose, ICD10: E11.65, okay with 90 day supply if possible    DULCOLAX, BISACODYL, ORAL Take 1 tablet by mouth daily as needed (constipation).    FLUoxetine 40 MG capsule Take 1 capsule (40 mg total) by mouth once daily.    hydrALAZINE (APRESOLINE) 100 MG tablet Take 1 tablet (100 mg total) by mouth every 8 (eight) hours as needed (elevated high blood pressure). " "   insulin aspart U-100 (NOVOLOG) 100 unit/mL (3 mL) InPn pen Inject 0-10 Units into the skin before meals and at bedtime as needed (Hyperglycemia).    insulin glargine U-100, Lantus, 100 unit/mL (3 mL) SubQ InPn pen Inject 8 Units into the skin every evening.    isosorbide mononitrate (IMDUR) 60 MG 24 hr tablet Take 1 tablet (60 mg total) by mouth once daily.    LORazepam (ATIVAN) 1 MG tablet Take 1 tablet by mouth once daily as needed for anxiety.    meclizine (ANTIVERT) 12.5 mg tablet Take 1 tablet (12.5 mg total) by mouth daily as needed for dizziness.    MELATONIN ORAL Take 1 tablet by mouth nightly as needed (insomnia).    multivitamin (THERAGRAN) per tablet Take 1 tablet by mouth once daily.    ondansetron (ZOFRAN-ODT) 8 MG TbDL Dissolve 1 tablet (8 mg total) by mouth every 8 (eight) hours as needed (nausea).    pantoprazole (PROTONIX) 40 MG tablet Take 1 tablet (40 mg total) by mouth once daily.    QUEtiapine (SEROQUEL) 50 MG tablet Take 1 tablet (50 mg total) by mouth nightly as needed (insomnia).    ticagrelor (BRILINTA) 60 mg tablet Take 1 tablet (60 mg total) by mouth 2 (two) times daily.    triamcinolone acetonide 0.1% (KENALOG) 0.1 % cream Apply topically 2 (two) times daily.    pen needle, diabetic (BD ULTRA-FINE SAGAR PEN NEEDLE) 32 gauge x 5/32" Ndle One pen needle use with insulin pen 4 times a day.  ICD-10: E11.9       Potential issues to be addressed PRIOR TO DISCHARGE  Patient reported not taking the following medications: (ROXICODONE). These medications remain on the home medication list. Please address accordingly.           Sandie Johnson  EXT [phone]                  .          "

## 2025-03-03 NOTE — ASSESSMENT & PLAN NOTE
"Patient's FSGs are controlled on current hypoglycemics.   Last A1c reviewed-   Lab Results   Component Value Date    HGBA1C 6.6 (H) 02/27/2025     Most recent fingerstick glucose reviewed- No results for input(s): "POCTGLUCOSE" in the last 24 hours.  Current correctional scale  Low  Maintain anti-hyperglycemic dose as follows-   Antihyperglycemics (From admission, onward)    Start     Stop Route Frequency Ordered    03/03/25 2100  insulin glargine U-100 (Lantus) pen 5 Units         -- SubQ Nightly 03/03/25 0233    03/03/25 0227  insulin aspart U-100 pen 0-5 Units         -- SubQ Before meals & nightly PRN 03/03/25 0128      -Accuchecks AC/HS  -Diabetic/renal diet  "

## 2025-03-03 NOTE — ASSESSMENT & PLAN NOTE
Pulmonary hypertension -- echo 11/2019   Aortic stenosis  is severe, with mitral regurgitation, suspect cardiorenal syndrome due to acute CHF.   Has LE edema, pericardial and pleural effusions.     Patient is identified as having Diastolic (HFpEF) heart failure that is Acute on Chronic. CHF is currently uncontrolled due to volume overload due to: Continued edema of extremities, Rales/crackles on pulmonary exam, and Pulmonary edema/pleural effusion on CXR. Latest ECHO performed and demonstrates- Results for orders placed during the hospital encounter of 02/15/25    Echo Saline Bubble? No    Interpretation Summary    Left Ventricle: The left ventricle is normal in size. There is mild concentric hypertrophy. Septal motion is consistent with pacing. There is normal systolic function with a visually estimated ejection fraction of 60 - 65%. Grade II diastolic dysfunction.    Right Ventricle: Mild right ventricular enlargement. Systolic function is normal.    Left Atrium: Left atrium is moderately dilated.    Right Atrium: Right atrium is mildly dilated.    Aortic Valve: There is moderate stenosis. Aortic valve area by VTI is 1.0 cm². Aortic valve peak velocity is 3.2 m/s. Mean gradient is 24 mmHg. The dimensionless index is 0.33.    Mitral Valve: There is mild stenosis. The mean pressure gradient across the mitral valve is 12 mmHg at a heart rate of  bpm. There is mild regurgitation.    Tricuspid Valve: There is moderate regurgitation.    Pulmonary Artery: The estimated pulmonary artery systolic pressure is 72 mmHg.    IVC/SVC: Intermediate venous pressure at 8 mmHg.    Pericardium: There is a trivial posterior effusion.  . Continue Nitrate/Vasodilator and monitor clinical status closely. Monitor on telemetry. Patient is off CHF pathway.  Monitor strict Is&Os and daily weights.  Place on fluid restriction of 1.5 L. Continue to stress to patient importance of self efficacy and  on diet for CHF. Last BNP reviewed-  and noted below   Recent Labs   Lab 03/02/25  2104   BNP 1,335*     -Received 100mg IV lasix x1, will monitor UO. Can continue IV lasix bid if pt has UO.  -Will add back metoprolol 25mg qd.  -ECHO 3/3:    Left Ventricle: The left ventricle is normal in size. Ventricular mass is normal. Normal wall thickness. There is concentric remodeling. There is normal systolic function with a visually estimated ejection fraction of 55%. Quantitated ejection fraction is 50%. There is diastolic dysfunction. Elevated left ventricular filling pressure.    Left Ventricle: Regional wall motion abnormalities present.    Right Ventricle: The right ventricle is normal in size. Systolic function is borderline low.    Left Atrium: severely dilated    Aortic Valve: There is moderate aortic valve sclerosis. Severely restricted motion. There is severe stenosis. Aortic valve area by VTI is 0.7 cm2. Aortic valve peak velocity is 4.2 m/s. Mean gradient is 40 mmHg. The dimensionless index is 0.23.    Mitral Valve: There is mild anterior leaflet sclerosis. There is severe posterior mitral annular calcification present. There is mild to moderate stenosis. The mean pressure gradient across the mitral valve is 6 mmHg at a heart rate of 89 bpm. There is mild to moderate MITRAL regurgitation. MVA 1.9cm2 by planimetry    Tricuspid Valve: There is moderate regurgitation.    Pulmonary Artery: The estimated pulmonary artery systolic pressure is 73 mmHg.    IVC/SVC: Elevated venous pressure at 15 mmHg.    Pericardium: There is a moderate circumferential effusion. Bilateral pleural effusions.    <30% respiratory variation across mitral valve, no diastolic RV collapse, however effusion moderate and new, would continue to monitor closely.

## 2025-03-03 NOTE — H&P
David Mijares - Emergency Dept  [facility] Medicine  History & Physical    Patient Name: Lorena Contreras  MRN: [id number]  Patient Class: OP- Observation  Admission Date: 3/2/2025  Attending Physician: Kari Smith MD   Primary Care Provider: Jorge Paris MD         Patient information was obtained from patient, past medical records, and ER records.     Subjective:     Principal Problem:Hypervolemia associated with renal insufficiency    Chief Complaint:   Chief Complaint   Patient presents with    Shortness of Breath     Shortness of breath, and chest pain, just started on dialysis last week         HPI: Lorena Contreras is a 74 y.o. female with a PMHx DM2, fibromyalgia, lymphoma s/p chemo, HTN, HLD, CVA, MI, CAD s/p PCIs, HFpEF, anemia, KYA on CKD3b newly on HD (2/22/25), and recent admission at [facility] (2/15- 2/25/25) for CHB, KYA, and NSTEMI who presents to the emergency department with a chief complaint of worsening shortness of breath and chest tightness since discharge. Reports mild improvement of symptoms after HD lasting several hours. Endorses associated OSMAN, significant swelling to BLE and abdomen, orthopnea, wheezing, fatigue, and generalized weakness. Patient reports the chest tightness is non-radiating, substernal and is worse with MSK manipulation and deep breathing, but unaffected by exertion or positioning. Also reports diffuse pain to legs that she attributes to the swelling. She notes generalized abdominal discomfort, described as a dull, nagging pain. Does report nausea and several episodes of vomiting. States since initiation of HD she has been anuric. No missed HD treatments, last HD session 3/1/25. She denies fever/chills, cough, congestion, headache, or lightheadedness/dizziness.     In the ED, patient with tachypnea and mild tachycardia otherwise vitals stable, afebrile. No documented hypoxia, but placed on 2L O2 via NC for comfort. CBC with stable anemia and WBC 16.29. PT/INR  WNL. Na 135. Bicarb 20. Cr 2.1 (bl prior to HD initiation ~2.0). Glucose 194. Calcium 8.6. Albumin 3.1. BNP 1,335. HS troponin 64>>57. EKG shows SR w/ bigeminy, 70 bpm, no ST elevation or depression. CXR with cardiomegaly with pulmonary edema and bilateral pleural effusions. Aeration is similar to mildly worse when compared with 02/19/2025. New right-sided central venous catheter overlying the SVC. Abd US with cholelithiasis and gallbladder sludge without evidence of cholecystitis. Distended gallbladder. Hepatomegaly. Bilateral pleural effusion. The patient received tylenol and 100mg IV lasix.    Past Medical History:   Diagnosis Date    Age-related osteoporosis with current pathological fracture with routine healing 11/13/2024    Allergy     Altered mental status 06/19/2022    DYSARTHRIA, SPASTIC MOVEMENTS & DIFFICULTY SWALLOWING    Anemia     Anxiety     Arthritis     Cataract     both removed    Colon polyps     Coronary artery disease     Depression     Diabetes mellitus, type II     Disorder of kidney and ureter     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/2023    Fibromyalgia     Follicular lymphoma     GERD (gastroesophageal reflux disease)     HTN (hypertension)     Hyperlipidemia     MI (myocardial infarction) 03/2019    Personal history of colonic polyps     Restless leg syndrome     Stroke        Past Surgical History:   Procedure Laterality Date    A-V CARDIAC PACEMAKER INSERTION N/A 2/17/2025    Procedure: INSERTION, CARDIAC PACEMAKER, DUAL CHAMBER;  Surgeon: Karl Rico MD;  Location: [facility] CATH LAB;  Service: Cardiology;  Laterality: N/A;    APPLICATION OF WOUND VACUUM-ASSISTED CLOSURE DEVICE Right 9/25/2024    Procedure: APPLICATION, WOUND VAC;  Surgeon: Neto Davalos MD;  Location: [facility] OR [address] [place] [address];  Service: Orthopedics;  Laterality: Right;    COLONOSCOPY  11/07/2012    Colon polyp found; repeat in 5 years    COLONOSCOPY N/A 11/4/2024    Procedure: COLONOSCOPY;  Surgeon: David Castaneda,  MD;  Location: [facility] (2ND FLR);  Service: Endoscopy;  Laterality: N/A;    DEBRIDEMENT OF LOCAL FLAP Right 9/25/2024    Procedure: DEBRIDEMENT, LOCAL FLAP;  Surgeon: Neto Davalos MD;  [address];  Service: Orthopedics;  Laterality: Right;    ELBOW SURGERY Right 2015    dislocation repair     ESOPHAGOGASTRODUODENOSCOPY  11/07/2012    atrophic gastritis, H pylori testing negative    INCISION AND DRAINAGE FOOT Right 6/2/2021    Procedure: INCISION AND DRAINAGE, FOOT, bone biopsy;  Surgeon: Quiana Penn DPM;  Location: [facility];  Service: Podiatry;  Laterality: Right;    IRRIGATION AND DEBRIDEMENT OF LOWER EXTREMITY Right 9/25/2024    Procedure: IRRIGATION AND DEBRIDEMENT, LOWER EXTREMITY; slider table, supine, bone foam, cysto tubing, 6L NS/dakins/peroxide, culture swabs;  Surgeon: Neto Davalos MD;  [address];  Service: Orthopedics;  Laterality: Right;    KNEE SURGERY Bilateral 2015    scoped    LEFT HEART CATHETERIZATION Left 3/29/2019    Procedure: Left heart cath;  Surgeon: Bladimir Barbosa MD;  Location: [facility] CATH LAB;  Service: Cardiology;  Laterality: Left;    LEFT HEART CATHETERIZATION Left 11/18/2019    Procedure: Left heart cath;  Surgeon: Karl Rico MD;  Location: [facility] CATH LAB;  Service: Cardiology;  Laterality: Left;    LEFT HEART CATHETERIZATION Left 1/8/2020    Procedure: Left heart cath, right radial, noon start;  Surgeon: Christos Monreal MD;  Location: [facility] CATH LAB;  Service: Cardiology;  Laterality: Left;  RN Pre Op 1-6-20.  To be admitted 1-7-20 sor Aspirin Disensitation    LEFT HEART CATHETERIZATION Left 2/19/2025    Procedure: Left heart cath;  Surgeon: Karl Rico MD;  Location: [facility] CATH LAB;  Service: Cardiology;  Laterality: Left;    OPEN REDUCTION AND INTERNAL FIXATION (ORIF) OF FRACTURE OF ACETABULUM Right 8/16/2024    Procedure: ORIF, FRACTURE, ACETABULUM;  Surgeon: Neto Davalos MD;  Location: [facility]  2ND FLR;  Service: Orthopedics;  Laterality: Right;  anterior and lateral pelvic incisions    OPEN REDUCTION AND INTERNAL FIXATION (ORIF) OF PILON FRACTURE Right 8/14/2024    Procedure: ORIF, FRACTURE, PILON;  Surgeon: Neto Davalos MD;  Location: [facility] OR 2ND FLR;  Service: Orthopedics;  Laterality: Right;    TONSILLECTOMY  1955    ULTRASOUND GUIDANCE  1/8/2020    Procedure: Ultrasound Guidance;  Surgeon: Christos Monreal MD;  Location: [facility] CATH LAB;  Service: Cardiology;;       Review of patient's allergies indicates:   Allergen Reactions    Novolin 70/30 (semi-synthetic) Nausea And Vomiting     Severe vomiting on Relion 70/30    Sulfa (sulfonamide antibiotics) Anaphylaxis    Talwin [pentazocine lactate] Anaphylaxis    Victoza [liraglutide] Nausea And Vomiting    Glipizide Nausea Only    Codeine     Influenza virus vaccines Hives    Iodine and iodide containing products Hives    Levetiracetam Itching    Lyrica [pregabalin] Hallucinations    Neurontin [gabapentin]      Possible associated myoclonic jerk    Rituxan [rituximab] Hives    Zoloft [sertraline] Nausea And Vomiting       No current facility-administered medications on file prior to encounter.     Current Outpatient Medications on File Prior to Encounter   Medication Sig    albuterol (PROVENTIL/VENTOLIN HFA) 90 mcg/actuation inhaler Inhale 2 puffs into the lungs every 6 (six) hours as needed for Wheezing or Shortness of Breath.    aluminum & magnesium hydroxide-simethicone (MYLANTA MAX STRENGTH) 400-400-40 mg/5 mL suspension Take 30 mLs by mouth every 6 (six) hours as needed for Indigestion.    aspirin 81 MG Chew Take 1 tablet (81 mg total) by mouth once daily. Hold to avoid bleed risk on triple therapy and then resume on oct 27 once eliquis stops on oct 26th    atorvastatin (LIPITOR) 80 MG tablet Take 1 tablet (80 mg total) by mouth every evening.    cephALEXin (KEFLEX) 500 MG capsule Take 1 capsule (500 mg total) by mouth every 8 (eight) hours.  "   DEXCOM G7  Misc Use 1  to track blood glucose, ICD10: E11.65    DEXCOM G7 SENSOR Samina Use 1 sensor every 10 days to track blood glucose, ICD10: E11.65, okay with 90 day supply if possible    FLUoxetine 40 MG capsule Take 1 capsule (40 mg total) by mouth once daily.    hydrALAZINE (APRESOLINE) 100 MG tablet Take 1 tablet (100 mg total) by mouth every 8 (eight) hours.    insulin aspart U-100 (NOVOLOG) 100 unit/mL (3 mL) InPn pen Inject 0-10 Units into the skin before meals and at bedtime as needed (Hyperglycemia).    insulin glargine U-100, Lantus, 100 unit/mL (3 mL) SubQ InPn pen Inject 10 Units into the skin every evening.    isosorbide mononitrate (IMDUR) 60 MG 24 hr tablet Take 1 tablet (60 mg total) by mouth once daily.    LORazepam (ATIVAN) 1 MG tablet TAKE 1 TABLET BY MOUTH ONCE DAILY AS NEEDED FOR ANXIETY    meclizine (ANTIVERT) 12.5 mg tablet Take 1 tablet (12.5 mg total) by mouth 2 (two) times daily as needed for Dizziness.    NOVOLOG FLEXPEN U-100 INSULIN 100 unit/mL (3 mL) InPn pen Inject 10 Units into the skin 3 (three) times daily with meals.    ondansetron (ZOFRAN-ODT) 8 MG TbDL Dissolve 1 tablet (8 mg total) by mouth every 8 (eight) hours as needed (nausea).    oxyCODONE (ROXICODONE) 10 mg Tab immediate release tablet Take 1 tablet (10 mg total) by mouth every 12 (twelve) hours as needed for Pain.    pantoprazole (PROTONIX) 40 MG tablet Take 1 tablet (40 mg total) by mouth once daily.    pen needle, diabetic (BD ULTRA-FINE SAGAR PEN NEEDLE) 32 gauge x 5/32" Ndle One pen needle use with insulin pen 4 times a day.  ICD-10: E11.9    QUEtiapine (SEROQUEL) 50 MG tablet Take 1 tablet (50 mg total) by mouth nightly as needed (insomnia).    ticagrelor (BRILINTA) 60 mg tablet Take 1 tablet (60 mg total) by mouth 2 (two) times daily.    tobramycin-dexAMETHasone 0.3-0.1% (TOBRADEX) 0.3-0.1 % DrpS Place 1 drop into the right eye 4 (four) times daily.    triamcinolone acetonide 0.1% (KENALOG) 0.1 " % cream Apply topically 2 (two) times daily.     Family History       Problem Relation (Age of Onset)    Allergy (severe) Daughter    Cancer Mother, Father    Diabetes Sister    Heart disease Mother    Hypertension Sister    Lung cancer Brother    No Known Problems Daughter          Tobacco Use    Smoking status: Never    Smokeless tobacco: Never   Substance and Sexual Activity    Alcohol use: Not Currently    Drug use: Never    Sexual activity: Not Currently     Partners: Male     Review of Systems   Constitutional:  Positive for activity change and fatigue. Negative for appetite change, chills, diaphoresis and fever.   HENT:  Negative for congestion, sore throat and trouble swallowing.    Eyes:  Negative for photophobia and visual disturbance.   Respiratory:  Positive for chest tightness, shortness of breath and wheezing.         +orthopnea  +OSMAN   Cardiovascular:  Positive for leg swelling. Negative for chest pain and palpitations.   Gastrointestinal:  Positive for abdominal distention, abdominal pain and constipation. Negative for diarrhea, nausea and vomiting.   Genitourinary:  Positive for decreased urine volume (anuric since intitiation on HD).   Musculoskeletal:  Positive for myalgias. Negative for back pain and neck pain.   Skin:  Negative for color change, pallor and rash.   Neurological:  Positive for weakness (generalized). Negative for syncope, light-headedness and headaches.   Psychiatric/Behavioral:  Negative for confusion and hallucinations. The patient is nervous/anxious.      Objective:     Vital Signs (Most Recent):  Temp: 98.1 °F (36.7 °C) (03/03/25 0100)  Pulse: 94 (03/03/25 0100)  Resp: 17 (03/03/25 0100)  BP: (!) 157/71 (03/03/25 0100)  SpO2: 100 % (03/03/25 0100) Vital Signs (24h Range):  Temp:  [97.5 °F (36.4 °C)-98.2 °F (36.8 °C)] 98.1 °F (36.7 °C)  Pulse:  [] 94  Resp:  [17-21] 17  SpO2:  [97 %-100 %] 100 %  BP: (147-157)/(63-71) 157/71     Weight: 85.7 kg (189 lb)  Body mass index  is 27.91 kg/m².     Physical Exam  Vitals and nursing note reviewed.   Constitutional:       General: She is not in acute distress.     Appearance: She is not toxic-appearing or diaphoretic.      Comments: Chronically ill appearing   HENT:      Head: Normocephalic and atraumatic.      Nose: Nose normal.      Mouth/Throat:      Mouth: Mucous membranes are moist.   Eyes:      Pupils: Pupils are equal, round, and reactive to light.   Cardiovascular:      Rate and Rhythm: Regular rhythm. Tachycardia present.      Pulses: Normal pulses.      Heart sounds: Murmur heard.   Pulmonary:      Effort: Pulmonary effort is normal. Tachypnea (mild) present. No respiratory distress.      Breath sounds: Rales present. No wheezing or rhonchi.   Chest:       Abdominal:      General: Bowel sounds are normal. There is distension.      Palpations: Abdomen is soft.      Tenderness: There is generalized abdominal tenderness (mild generalized TTP). There is no guarding.   Musculoskeletal:         General: Normal range of motion.      Cervical back: Normal range of motion.      Right lower le+ Pitting Edema present.      Left lower le+ Pitting Edema present.      Comments: BLE edema up to abdomen   Skin:     General: Skin is warm and dry.      Capillary Refill: Capillary refill takes less than 2 seconds.      Findings: Bruising (BUE) present.   Neurological:      General: No focal deficit present.      Mental Status: She is alert and oriented to person, place, and time.      Sensory: No sensory deficit.      Motor: No weakness.   Psychiatric:         Mood and Affect: Mood is anxious.         Speech: Speech normal.         Behavior: Behavior is cooperative.            CRANIAL NERVES     CN III, IV, VI   Pupils are equal, round, and reactive to light.       Significant Labs: All pertinent labs within the past 24 hours have been reviewed.  CBC:   Recent Labs   Lab 25  2104   WBC 16.29*   HGB 8.5*   HCT 27.1*        CMP:  "  Recent Labs   Lab 03/02/25 2104   *   K 3.5      CO2 20*   *   BUN 19   CREATININE 2.1*   CALCIUM 8.6*   PROT 7.0   ALBUMIN 3.1*   BILITOT 0.6   ALKPHOS 146   AST 34   ALT 13   ANIONGAP 13     Cardiac Markers:   Recent Labs   Lab 03/02/25 2104   BNP 1,335*     Troponin:   Recent Labs   Lab 03/02/25 2104 03/02/25  2254   TROPONINIHS 64* 57*       Significant Imaging: I have reviewed all pertinent imaging results/findings within the past 24 hours.  Imaging Results              X-Ray Chest AP Portable (Final result)  Result time 03/03/25 00:11:10      Final result by Portillo Oquendo MD (03/03/25 00:11:10)                   Impression:      Cardiomegaly with pulmonary edema and bilateral pleural effusions.  Aeration is similar to mildly worse when compared with 02/19/2025.    New right-sided central venous catheter overlying the SVC.      Electronically signed by: Portillo Oquendo MD  Date:    03/03/2025  Time:    00:11               Narrative:    EXAMINATION:  XR CHEST AP PORTABLE    CLINICAL HISTORY:  Provided history is "  Shortness of breath".    TECHNIQUE:  One view of the chest.    COMPARISON:  02/19/2025.    FINDINGS:  Cardiac wires overlie the chest.  Cardiomediastinal silhouette is enlarged and similar to the prior study.  Right-sided central venous catheter overlies the SVC.  Cardiac pacing device is again present.  Atherosclerotic calcifications overlie the aortic arch.  Central vascular congestion with bilateral interstitial opacities, likely pulmonary edema.  Small to moderate bilateral pleural effusions.  Passive atelectasis or airspace disease in the lung bases.  Upper lungs are relatively clear.  No distinct pneumothorax.                                       US Abdomen Limited (Final result)  Result time 03/02/25 23:40:39      Final result by Portillo Oquendo MD (03/02/25 23:40:39)                   Impression:      1. Cholelithiasis and gallbladder sludge without evidence " "of cholecystitis.  Distended gallbladder.  2. Hepatomegaly.  3. Bilateral pleural effusion.    Electronically signed by resident: Reno Finnegan  Date:    03/02/2025  Time:    23:11    Electronically signed by: Portillo Oquendo MD  Date:    03/02/2025  Time:    23:40               Narrative:    EXAMINATION:  US ABDOMEN LIMITED    CLINICAL HISTORY:  Provided clinical history is "liver".  Additional history includes chest pain and shortness of breath.  Upper abdominal pain.    TECHNIQUE:  Limited ultrasound of the right upper quadrant of the abdomen including pancreas, liver, gallbladder, common bile duct, and spleen was performed.    COMPARISON:  Abdominal ultrasound 09/15/2021.    FINDINGS:  Liver: Mildly enlarged measuring 18.4 cm. Homogeneous echotexture. Unchanged calcification in the left hepatic lobe measuring 0.7 x 3.1 cm, previously 0.6 x 2.7 cm.    Gallbladder: Several mobile calcified gallstones, the largest measuring 7 mm, and sludge.  No wall thickening, mural hyperemia, or pericholecystic fluid.  No sonographic Christian's sign.  Gallbladder is distended.    Biliary system: The common duct is not dilated for age, measuring 6 mm.  No intrahepatic ductal dilatation.    Spleen: Upper limit of normal in size with a homogeneous echotexture, measuring 12.2 x 5.0 cm.    Pancreas: The visualized portions of pancreas appear normal.    Miscellaneous: No ascites.  Bilateral pleural effusion.  Limited evaluation of the right kidney is negative for hydronephrosis.                                       Assessment/Plan:     * Hypervolemia associated with renal insufficiency  Acute kidney injury superimposed on stage 3b chronic kidney disease   Recently diagnosed with KYA due to cardiogenic shock secondary to complete heart block, initially improved. However developed NSTEMI and underwent LHC 2/19 and was initiated on HD on 2/22 while admitted at [facility]. Pt reports worsening SOB, diffuse body swelling, and orthopnea " since discharge. States since initiation of HD she has been anuric. No missed HD treatments, last HD session 3/1/25. Of note her daily lasix was discontinued at discharge.    Baseline creatinine is ~2.0. Most recent creatinine and eGFR are listed below.  Recent Labs     03/02/25  2104   CREATININE 2.1*   EGFRNORACEVR 24.3*      Plan  - KYA is stable  - Avoid nephrotoxins and renally dose meds for GFR listed above  - Monitor urine output, serial BMP, and adjust therapy as needed  - Consult nephrology for HD.   - Trial lasix while awaiting HD.  - UA, urine Na, and urine Protein/Cr ratio  - Renal US pending.  - Daily wt/ strict I&Os  - Renal diet/ 1.5L FR    Acute on chronic diastolic heart failure  Pulmonary hypertension -- echo 11/2019   Aortic stenosis   Patient is identified as having Diastolic (HFpEF) heart failure that is Acute on Chronic. CHF is currently uncontrolled due to volume overload due to: Continued edema of extremities, Rales/crackles on pulmonary exam, and Pulmonary edema/pleural effusion on CXR. Latest ECHO performed and demonstrates- Results for orders placed during the hospital encounter of 02/15/25    Echo Saline Bubble? No    Interpretation Summary    Left Ventricle: The left ventricle is normal in size. There is mild concentric hypertrophy. Septal motion is consistent with pacing. There is normal systolic function with a visually estimated ejection fraction of 60 - 65%. Grade II diastolic dysfunction.    Right Ventricle: Mild right ventricular enlargement. Systolic function is normal.    Left Atrium: Left atrium is moderately dilated.    Right Atrium: Right atrium is mildly dilated.    Aortic Valve: There is moderate stenosis. Aortic valve area by VTI is 1.0 cm². Aortic valve peak velocity is 3.2 m/s. Mean gradient is 24 mmHg. The dimensionless index is 0.33.    Mitral Valve: There is mild stenosis. The mean pressure gradient across the mitral valve is 12 mmHg at a heart rate of  bpm. There is  "mild regurgitation.    Tricuspid Valve: There is moderate regurgitation.    Pulmonary Artery: The estimated pulmonary artery systolic pressure is 72 mmHg.    IVC/SVC: Intermediate venous pressure at 8 mmHg.    Pericardium: There is a trivial posterior effusion.  . Continue Nitrate/Vasodilator and monitor clinical status closely. Monitor on telemetry. Patient is off CHF pathway.  Monitor strict Is&Os and daily weights.  Place on fluid restriction of 1.5 L. Continue to stress to patient importance of self efficacy and  on diet for CHF. Last BNP reviewed- and noted below   Recent Labs   Lab 03/02/25 2104   BNP 1,335*   -Received 100mg IV lasix x1, will monitor UO. Can continue IV lasix bid if pt has UO.  -Will add back metoprolol 25mg qd.  -ECHO pending.    GERD (gastroesophageal reflux disease)  -Chronic, stable.  -Continue PPI.    Cardiac pacemaker in situ  CHB (complete heart block)  -s/p ppm on 02/17, taking keflex tid (EOT 3/3)  -no acute issue  -cardiac monitoring    Anemia  Anemia is likely due to chronic disease due to Chronic Kidney Disease. Most recent hemoglobin and hematocrit are listed below.  Recent Labs     03/02/25 2104   HGB 8.5*   HCT 27.1*     Plan  - Monitor serial CBC: Daily  - Transfuse PRBC if patient becomes hemodynamically unstable, symptomatic or H/H drops below 7/21.  - Patient has not received any PRBC transfusions to date  - Patient's anemia is currently stable    Constipation  Pt reports issues with constipation since recent discharge and has been taking colace without success.  -Will start bowel regimen.    Type 2 diabetes mellitus with stage 3b chronic kidney disease, with long-term current use of insulin  Patient's FSGs are controlled on current hypoglycemics.   Last A1c reviewed-   Lab Results   Component Value Date    HGBA1C 6.6 (H) 02/27/2025     Most recent fingerstick glucose reviewed- No results for input(s): "POCTGLUCOSE" in the last 24 hours.  Current correctional scale "  Low  Maintain anti-hyperglycemic dose as follows-   Antihyperglycemics (From admission, onward)      Start     Stop Route Frequency Ordered    03/03/25 2100  insulin glargine U-100 (Lantus) pen 5 Units         -- SubQ Nightly 03/03/25 0233    03/03/25 0227  insulin aspart U-100 pen 0-5 Units         -- SubQ Before meals & nightly PRN 03/03/25 0128        -Accuchecks AC/HS  -Diabetic/renal diet    Peripheral vascular disease in diabetes mellitus -- CLEMENT 05/2019  -Chronic issue.  -Continue ASA and statin.    Coronary artery disease of native artery of native heart with stable angina pectoris  Patient with known CAD s/p stent placement and CABG, which is controlled Will continue  brilinta, ASA, and Statin and monitor for S/Sx of angina/ACS. Continue to monitor on telemetry.   02/19   -S/p heart catheterization on 02/19, [facility] with patent stents and moderate LAD/RCA disease - no culprit identified     Mixed hyperlipidemia   Patient is chronically on statin.will continue for now. Monitor clinically. Last LDL was   Lab Results   Component Value Date    LDLCALC 105.6 01/14/2025        Follicular lymphoma  S/p chemo in 2010. In remission.     Primary hypertension  Patient's blood pressure range in the last 24 hours was: BP  Min: 147/68  Max: 157/71.The patient's inpatient anti-hypertensive regimen is listed below:  Current Antihypertensives  isosorbide mononitrate 24 hr tablet 60 mg, Daily, Oral  metoprolol succinate (TOPROL-XL) 24 hr tablet 25 mg, Daily, Oral  furosemide injection 80 mg, Every 12 hours, Intravenous  hydrALAZINE tablet 50 mg, Every 8 hours, Oral    Plan  - BP is controlled, no changes needed to their regimen    Anxiety  -Chronic issue.  -PRN hydroxyzine.      VTE Risk Mitigation (From admission, onward)           Ordered     heparin (porcine) injection 5,000 Units  Every 8 hours         03/03/25 0128     IP VTE HIGH RISK PATIENT  Once         03/03/25 0128     Place sequential compression device  Until  discontinued         03/03/25 0128                         On 03/03/2025, patient should be placed in hospital observation services under my care in collaboration with Sergio Brennan DO.           Janice Tucker NP  Department of Hospital Medicine  [facility] - Emergency Dept

## 2025-03-03 NOTE — SUBJECTIVE & OBJECTIVE
Past Medical History:   Diagnosis Date    Age-related osteoporosis with current pathological fracture with routine healing 11/13/2024    Allergy     Altered mental status 06/19/2022    DYSARTHRIA, SPASTIC MOVEMENTS & DIFFICULTY SWALLOWING    Anemia     Anxiety     Arthritis     Cataract     both removed    Colon polyps     Coronary artery disease     Depression     Diabetes mellitus, type II     Disorder of kidney and ureter     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/2023    Fibromyalgia     Follicular lymphoma     GERD (gastroesophageal reflux disease)     HTN (hypertension)     Hyperlipidemia     MI (myocardial infarction) 03/2019    Personal history of colonic polyps     Restless leg syndrome     Stroke        Past Surgical History:   Procedure Laterality Date    A-V CARDIAC PACEMAKER INSERTION N/A 2/17/2025    Procedure: INSERTION, CARDIAC PACEMAKER, DUAL CHAMBER;  Surgeon: Karl Rico MD;  Location: [facility] CATH LAB;  Service: Cardiology;  Laterality: N/A;    APPLICATION OF WOUND VACUUM-ASSISTED CLOSURE DEVICE Right 9/25/2024    Procedure: APPLICATION, WOUND VAC;  Surgeon: Neto Davalos MD;  Location: [address];  Service: Orthopedics;  Laterality: Right;    COLONOSCOPY  11/07/2012    Colon polyp found; repeat in 5 years    COLONOSCOPY N/A 11/4/2024    Procedure: COLONOSCOPY;  Surgeon: David Castaneda MD;  Location: [address]);  Service: Endoscopy;  Laterality: N/A;    DEBRIDEMENT OF LOCAL FLAP Right 9/25/2024    Procedure: DEBRIDEMENT, LOCAL FLAP;  Surgeon: Neto Davalos MD;  Location: [address];  Service: Orthopedics;  Laterality: Right;    ELBOW SURGERY Right 2015    dislocation repair     ESOPHAGOGASTRODUODENOSCOPY  11/07/2012    atrophic gastritis, H pylori testing negative    INCISION AND DRAINAGE FOOT Right 6/2/2021    Procedure: INCISION AND DRAINAGE, FOOT, bone biopsy;  Surgeon: Quiana Penn DPM;  Location: [facility] OR;  Service:  Podiatry;  Laterality: Right;    IRRIGATION AND DEBRIDEMENT OF LOWER EXTREMITY Right 9/25/2024    Procedure: IRRIGATION AND DEBRIDEMENT, LOWER EXTREMITY; slider table, supine, bone foam, cysto tubing, 6L NS/dakins/peroxide, culture swabs;  Surgeon: Neto Davalos MD;  [facility];  Service: Orthopedics;  Laterality: Right;    KNEE SURGERY Bilateral 2015    scoped    LEFT HEART CATHETERIZATION Left 3/29/2019    Procedure: Left heart cath;  Surgeon: Bladimir Barbosa MD;  Location: [facility] CATH LAB;  Service: Cardiology;  Laterality: Left;    LEFT HEART CATHETERIZATION Left 11/18/2019    Procedure: Left heart cath;  Surgeon: Karl Rico MD;  Location: [facility] CATH LAB;  Service: Cardiology;  Laterality: Left;    LEFT HEART CATHETERIZATION Left 1/8/2020    Procedure: Left heart cath, right radial, noon start;  Surgeon: Christos Monreal MD;  Location: [facility] CATH LAB;  Service: Cardiology;  Laterality: Left;  RN Pre Op 1-6-20.  To be admitted 1-7-20 sor Aspirin Disensitation    LEFT HEART CATHETERIZATION Left 2/19/2025    Procedure: Left heart cath;  Surgeon: Karl Rico MD;  Location: [facility] CATH LAB;  Service: Cardiology;  Laterality: Left;    OPEN REDUCTION AND INTERNAL FIXATION (ORIF) OF FRACTURE OF ACETABULUM Right 8/16/2024    Procedure: ORIF, FRACTURE, ACETABULUM;  Surgeon: Neto Davalos MD;  [facility];  Service: Orthopedics;  Laterality: Right;  anterior and lateral pelvic incisions    OPEN REDUCTION AND INTERNAL FIXATION (ORIF) OF PILON FRACTURE Right 8/14/2024    Procedure: ORIF, FRACTURE, PILON;  Surgeon: Neto Davalos MD;  [facility];  Service: Orthopedics;  Laterality: Right;    TONSILLECTOMY  1955    ULTRASOUND GUIDANCE  1/8/2020    Procedure: Ultrasound Guidance;  Surgeon: Christos Monreal MD;  Location: [facility] CATH LAB;  Service: Cardiology;;       Review of patient's allergies indicates:   Allergen Reactions     Novolin 70/30 (semi-synthetic) Nausea And Vomiting     Severe vomiting on Relion 70/30    Sulfa (sulfonamide antibiotics) Anaphylaxis    Talwin [pentazocine lactate] Anaphylaxis    Victoza [liraglutide] Nausea And Vomiting    Glipizide Nausea Only    Codeine     Influenza virus vaccines Hives    Iodine and iodide containing products Hives    Levetiracetam Itching    Lyrica [pregabalin] Hallucinations    Neurontin [gabapentin]      Possible associated myoclonic jerk    Rituxan [rituximab] Hives    Zoloft [sertraline] Nausea And Vomiting       No current facility-administered medications on file prior to encounter.     Current Outpatient Medications on File Prior to Encounter   Medication Sig    albuterol (PROVENTIL/VENTOLIN HFA) 90 mcg/actuation inhaler Inhale 2 puffs into the lungs every 6 (six) hours as needed for Wheezing or Shortness of Breath.    aluminum & magnesium hydroxide-simethicone (MYLANTA MAX STRENGTH) 400-400-40 mg/5 mL suspension Take 30 mLs by mouth every 6 (six) hours as needed for Indigestion.    aspirin 81 MG Chew Take 1 tablet (81 mg total) by mouth once daily. Hold to avoid bleed risk on triple therapy and then resume on oct 27 once eliquis stops on oct 26th    atorvastatin (LIPITOR) 80 MG tablet Take 1 tablet (80 mg total) by mouth every evening.    cephALEXin (KEFLEX) 500 MG capsule Take 1 capsule (500 mg total) by mouth every 8 (eight) hours.    DEXCOM G7  Misc Use 1  to track blood glucose, ICD10: E11.65    DEXCOM G7 SENSOR Samina Use 1 sensor every 10 days to track blood glucose, ICD10: E11.65, okay with 90 day supply if possible    FLUoxetine 40 MG capsule Take 1 capsule (40 mg total) by mouth once daily.    hydrALAZINE (APRESOLINE) 100 MG tablet Take 1 tablet (100 mg total) by mouth every 8 (eight) hours.    insulin aspart U-100 (NOVOLOG) 100 unit/mL (3 mL) InPn pen Inject 0-10 Units into the skin before meals and at bedtime as needed (Hyperglycemia).  "   insulin glargine U-100, Lantus, 100 unit/mL (3 mL) SubQ InPn pen Inject 10 Units into the skin every evening.    isosorbide mononitrate (IMDUR) 60 MG 24 hr tablet Take 1 tablet (60 mg total) by mouth once daily.    LORazepam (ATIVAN) 1 MG tablet TAKE 1 TABLET BY MOUTH ONCE DAILY AS NEEDED FOR ANXIETY    meclizine (ANTIVERT) 12.5 mg tablet Take 1 tablet (12.5 mg total) by mouth 2 (two) times daily as needed for Dizziness.    NOVOLOG FLEXPEN U-100 INSULIN 100 unit/mL (3 mL) InPn pen Inject 10 Units into the skin 3 (three) times daily with meals.    ondansetron (ZOFRAN-ODT) 8 MG TbDL Dissolve 1 tablet (8 mg total) by mouth every 8 (eight) hours as needed (nausea).    oxyCODONE (ROXICODONE) 10 mg Tab immediate release tablet Take 1 tablet (10 mg total) by mouth every 12 (twelve) hours as needed for Pain.    pantoprazole (PROTONIX) 40 MG tablet Take 1 tablet (40 mg total) by mouth once daily.    pen needle, diabetic (BD ULTRA-FINE SAGAR PEN NEEDLE) 32 gauge x 5/32" Ndle One pen needle use with insulin pen 4 times a day.  ICD-10: E11.9    QUEtiapine (SEROQUEL) 50 MG tablet Take 1 tablet (50 mg total) by mouth nightly as needed (insomnia).    ticagrelor (BRILINTA) 60 mg tablet Take 1 tablet (60 mg total) by mouth 2 (two) times daily.    tobramycin-dexAMETHasone 0.3-0.1% (TOBRADEX) 0.3-0.1 % DrpS Place 1 drop into the right eye 4 (four) times daily.    triamcinolone acetonide 0.1% (KENALOG) 0.1 % cream Apply topically 2 (two) times daily.     Family History       Problem Relation (Age of Onset)    Allergy (severe) Daughter    Cancer Mother, Father    Diabetes Sister    Heart disease Mother    Hypertension Sister    Lung cancer Brother    No Known Problems Daughter          Tobacco Use    Smoking status: Never    Smokeless tobacco: Never   Substance and Sexual Activity    Alcohol use: Not Currently    Drug use: Never    Sexual activity: Not Currently     Partners: Male     Review of Systems "   Constitutional:  Positive for activity change and fatigue. Negative for appetite change, chills, diaphoresis and fever.   HENT:  Negative for congestion, sore throat and trouble swallowing.    Eyes:  Negative for photophobia and visual disturbance.   Respiratory:  Positive for chest tightness, shortness of breath and wheezing.         +orthopnea  +OSMAN   Cardiovascular:  Positive for leg swelling. Negative for chest pain and palpitations.   Gastrointestinal:  Positive for abdominal distention, abdominal pain and constipation. Negative for diarrhea, nausea and vomiting.   Genitourinary:  Positive for decreased urine volume (anuric since intitiation on HD).   Musculoskeletal:  Positive for myalgias. Negative for back pain and neck pain.   Skin:  Negative for color change, pallor and rash.   Neurological:  Positive for weakness (generalized). Negative for syncope, light-headedness and headaches.   Psychiatric/Behavioral:  Negative for confusion and hallucinations. The patient is nervous/anxious.      Objective:     Vital Signs (Most Recent):  Temp: 98.1 °F (36.7 °C) (03/03/25 0100)  Pulse: 94 (03/03/25 0100)  Resp: 17 (03/03/25 0100)  BP: (!) 157/71 (03/03/25 0100)  SpO2: 100 % (03/03/25 0100) Vital Signs (24h Range):  Temp:  [97.5 °F (36.4 °C)-98.2 °F (36.8 °C)] 98.1 °F (36.7 °C)  Pulse:  [] 94  Resp:  [17-21] 17  SpO2:  [97 %-100 %] 100 %  BP: (147-157)/(63-71) 157/71     Weight: 85.7 kg (189 lb)  Body mass index is 27.91 kg/m².     Physical Exam  Vitals and nursing note reviewed.   Constitutional:       General: She is not in acute distress.     Appearance: She is not toxic-appearing or diaphoretic.      Comments: Chronically ill appearing   HENT:      Head: Normocephalic and atraumatic.      Nose: Nose normal.      Mouth/Throat:      Mouth: Mucous membranes are moist.   Eyes:      Pupils: Pupils are equal, round, and reactive to light.   Cardiovascular:      Rate and Rhythm: Regular rhythm. Tachycardia  present.      Pulses: Normal pulses.      Heart sounds: Murmur heard.   Pulmonary:      Effort: Pulmonary effort is normal. Tachypnea (mild) present. No respiratory distress.      Breath sounds: Rales present. No wheezing or rhonchi.   Chest:       Abdominal:      General: Bowel sounds are normal. There is distension.      Palpations: Abdomen is soft.      Tenderness: There is generalized abdominal tenderness (mild generalized TTP). There is no guarding.   Musculoskeletal:         General: Normal range of motion.      Cervical back: Normal range of motion.      Right lower le+ Pitting Edema present.      Left lower le+ Pitting Edema present.      Comments: BLE edema up to abdomen   Skin:     General: Skin is warm and dry.      Capillary Refill: Capillary refill takes less than 2 seconds.      Findings: Bruising (BUE) present.   Neurological:      General: No focal deficit present.      Mental Status: She is alert and oriented to person, place, and time.      Sensory: No sensory deficit.      Motor: No weakness.   Psychiatric:         Mood and Affect: Mood is anxious.         Speech: Speech normal.         Behavior: Behavior is cooperative.            CRANIAL NERVES     CN III, IV, VI   Pupils are equal, round, and reactive to light.       Significant Labs: All pertinent labs within the past 24 hours have been reviewed.  CBC:   Recent Labs   Lab 25   WBC 16.29*   HGB 8.5*   HCT 27.1*        CMP:   Recent Labs   Lab 25   *   K 3.5      CO2 20*   *   BUN 19   CREATININE 2.1*   CALCIUM 8.6*   PROT 7.0   ALBUMIN 3.1*   BILITOT 0.6   ALKPHOS 146   AST 34   ALT 13   ANIONGAP 13     Cardiac Markers:   Recent Labs   Lab 25   BNP 1,335*     Troponin:   Recent Labs   Lab 25  2254   TROPONINIHS 64* 57*       Significant Imaging: I have reviewed all pertinent imaging results/findings within the past 24 hours.  Imaging Results               "X-Ray Chest AP Portable (Final result)  Result time 03/03/25 00:11:10      Final result by Portillo Oquendo MD (03/03/25 00:11:10)                   Impression:      Cardiomegaly with pulmonary edema and bilateral pleural effusions.  Aeration is similar to mildly worse when compared with 02/19/2025.    New right-sided central venous catheter overlying the SVC.      Electronically signed by: Portillo Oquendo MD  Date:    03/03/2025  Time:    00:11               Narrative:    EXAMINATION:  XR CHEST AP PORTABLE    CLINICAL HISTORY:  Provided history is "  Shortness of breath".    TECHNIQUE:  One view of the chest.    COMPARISON:  02/19/2025.    FINDINGS:  Cardiac wires overlie the chest.  Cardiomediastinal silhouette is enlarged and similar to the prior study.  Right-sided central venous catheter overlies the SVC.  Cardiac pacing device is again present.  Atherosclerotic calcifications overlie the aortic arch.  Central vascular congestion with bilateral interstitial opacities, likely pulmonary edema.  Small to moderate bilateral pleural effusions.  Passive atelectasis or airspace disease in the lung bases.  Upper lungs are relatively clear.  No distinct pneumothorax.                                       US Abdomen Limited (Final result)  Result time 03/02/25 23:40:39      Final result by Portillo Oquendo MD (03/02/25 23:40:39)                   Impression:      1. Cholelithiasis and gallbladder sludge without evidence of cholecystitis.  Distended gallbladder.  2. Hepatomegaly.  3. Bilateral pleural effusion.    Electronically signed by resident: Reno Finnegan  Date:    03/02/2025  Time:    23:11    Electronically signed by: Portillo Oquendo MD  Date:    03/02/2025  Time:    23:40               Narrative:    EXAMINATION:  US ABDOMEN LIMITED    CLINICAL HISTORY:  Provided clinical history is "liver".  Additional history includes chest pain and shortness of breath.  Upper abdominal pain.    TECHNIQUE:  Limited " ultrasound of the right upper quadrant of the abdomen including pancreas, liver, gallbladder, common bile duct, and spleen was performed.    COMPARISON:  Abdominal ultrasound 09/15/2021.    FINDINGS:  Liver: Mildly enlarged measuring 18.4 cm. Homogeneous echotexture. Unchanged calcification in the left hepatic lobe measuring 0.7 x 3.1 cm, previously 0.6 x 2.7 cm.    Gallbladder: Several mobile calcified gallstones, the largest measuring 7 mm, and sludge.  No wall thickening, mural hyperemia, or pericholecystic fluid.  No sonographic Christian's sign.  Gallbladder is distended.    Biliary system: The common duct is not dilated for age, measuring 6 mm.  No intrahepatic ductal dilatation.    Spleen: Upper limit of normal in size with a homogeneous echotexture, measuring 12.2 x 5.0 cm.    Pancreas: The visualized portions of pancreas appear normal.    Miscellaneous: No ascites.  Bilateral pleural effusion.  Limited evaluation of the right kidney is negative for hydronephrosis.

## 2025-03-03 NOTE — ASSESSMENT & PLAN NOTE
Anemia is likely due to chronic disease due to Chronic Kidney Disease. Most recent hemoglobin and hematocrit are listed below.  Recent Labs     03/02/25  2104   HGB 8.5*   HCT 27.1*     Plan  - Monitor serial CBC: Daily  - Transfuse PRBC if patient becomes hemodynamically unstable, symptomatic or H/H drops below 7/21.  - Patient has not received any PRBC transfusions to date  - Patient's anemia is currently stable

## 2025-03-03 NOTE — SUBJECTIVE & OBJECTIVE
Interval History: see above    Review of Systems   Constitutional:  Positive for activity change. Negative for appetite change and fever.   HENT:  Negative for trouble swallowing.    Respiratory:  Positive for shortness of breath. Negative for cough.    Cardiovascular:  Negative for chest pain and leg swelling.   Gastrointestinal:  Positive for abdominal distention and abdominal pain. Negative for constipation, diarrhea and nausea.   Genitourinary:  Negative for difficulty urinating.   Musculoskeletal:  Negative for back pain, gait problem and joint swelling.   Skin:         Bottom discomfort related to pressure in bed, daniel w mattress overlay in the ED   Neurological:  Negative for numbness.   Psychiatric/Behavioral:  Negative for behavioral problems and confusion.      Objective:     Vital Signs (Most Recent):  Temp: 97.6 °F (36.4 °C) (03/03/25 0700)  Pulse: 99 (03/03/25 1000)  Resp: (!) 30 (03/03/25 1000)  BP: (!) 157/88 (03/03/25 1000)  SpO2: 100 % (03/03/25 1000) Vital Signs (24h Range):  Temp:  [97.5 °F (36.4 °C)-98.2 °F (36.8 °C)] 97.6 °F (36.4 °C)  Pulse:  [] 99  Resp:  [15-30] 30  SpO2:  [97 %-100 %] 100 %  BP: (128-170)/(60-88) 157/88     Weight: 85.7 kg (189 lb)  Body mass index is 27.91 kg/m².  No intake or output data in the 24 hours ending 03/03/25 1158      Physical Exam  Constitutional:       General: She is not in acute distress.     Appearance: Normal appearance.   HENT:      Head: Normocephalic and atraumatic.      Nose: Nose normal.      Mouth/Throat:      Mouth: Mucous membranes are moist.   Eyes:      General: No scleral icterus.     Extraocular Movements: Extraocular movements intact.      Pupils: Pupils are equal, round, and reactive to light.   Cardiovascular:      Rate and Rhythm: Normal rate and regular rhythm.      Pulses: Normal pulses.      Heart sounds: Murmur heard.      No gallop.   Pulmonary:      Effort: Pulmonary effort is normal.      Breath sounds: Normal breath sounds.  No wheezing, rhonchi or rales.      Comments: Decreased BS at bases bilateraly  Chest:      Chest wall: No tenderness.   Abdominal:      General: Abdomen is flat. Bowel sounds are normal. There is no distension.      Palpations: Abdomen is soft.      Tenderness: There is no abdominal tenderness. There is no right CVA tenderness, left CVA tenderness, guarding or rebound.   Musculoskeletal:         General: No swelling, tenderness or deformity. Normal range of motion.      Cervical back: Normal range of motion and neck supple. No rigidity or tenderness.      Right lower leg: Edema present.      Left lower leg: Edema present.   Skin:     General: Skin is warm and dry.      Coloration: Skin is not jaundiced or pale.      Findings: No erythema or rash.   Neurological:      General: No focal deficit present.      Mental Status: She is alert. Mental status is at baseline.      Cranial Nerves: No cranial nerve deficit.      Motor: No weakness.   Psychiatric:         Thought Content: Thought content normal.         Judgment: Judgment normal.             Significant Labs: All pertinent labs within the past 24 hours have been reviewed.  CBC:   Recent Labs   Lab 03/02/25  2104 03/03/25  0404   WBC 16.29* 11.13   HGB 8.5* 7.5*   HCT 27.1* 24.4*    251     CMP:   Recent Labs   Lab 03/02/25  2104 03/03/25  0520   * 135*   K 3.5 3.2*    105   CO2 20* 23   * 123*   BUN 19 20   CREATININE 2.1* 1.9*   CALCIUM 8.6* 8.2*   PROT 7.0 6.3   ALBUMIN 3.1* 2.8*   BILITOT 0.6 0.6   ALKPHOS 146 129   AST 34 28   ALT 13 12   ANIONGAP 13 7*       Significant Imaging: I have reviewed all pertinent imaging results/findings within the past 24 hours.

## 2025-03-03 NOTE — ASSESSMENT & PLAN NOTE
Patient is chronically on statin.will continue for now. Monitor clinically. Last LDL was   Lab Results   Component Value Date    LDLCALC 105.6 01/14/2025

## 2025-03-03 NOTE — PROGRESS NOTES
Called patient to confirm appointment for 3/5/25, no answer. Per chart review, patient currently in the [facility] ED for worsening SOB. Follow up scheduled for 3/4/25.

## 2025-03-03 NOTE — ASSESSMENT & PLAN NOTE
Patient with known CAD s/p stent placement and CABG, which is controlled Will continue  brilinta, ASA, and Statin and monitor for S/Sx of angina/ACS. Continue to monitor on telemetry.   02/19   -S/p heart catheterization on 02/19, [facility] with patent stents and moderate LAD/RCA disease - no culprit identified

## 2025-03-03 NOTE — PROVIDER PROGRESS NOTES - EMERGENCY DEPT.
Encounter Date: 3/2/2025    ED Physician Progress Notes        ED Resident HAND-OFF NOTE:  Lorena Contreras is a 74 y.o. female who presented to the ED on 3/2/2025, patient C/O dyspnea. I assumed care of patient from off-going ED physician team patient pending CXR.    Thus far, Lorena Contreras has received:  Medications   albuterol inhaler 2 puff (has no administration in time range)   aspirin chewable tablet 81 mg (has no administration in time range)   atorvastatin tablet 80 mg (80 mg Oral Given 3/3/25 0159)   isosorbide mononitrate 24 hr tablet 60 mg (has no administration in time range)   pantoprazole EC tablet 40 mg (has no administration in time range)   ticagrelor tablet 60 mg (has no administration in time range)   sodium chloride 0.9% flush 10 mL (has no administration in time range)   naloxone 0.4 mg/mL injection 0.02 mg (has no administration in time range)   glucose chewable tablet 16 g (has no administration in time range)   glucose chewable tablet 24 g (has no administration in time range)   dextrose 50% injection 12.5 g (has no administration in time range)   dextrose 50% injection 25 g (has no administration in time range)   glucagon (human recombinant) injection 1 mg (has no administration in time range)   heparin (porcine) injection 5,000 Units (5,000 Units Subcutaneous Given 3/3/25 0509)   acetaminophen tablet 650 mg (has no administration in time range)   ondansetron injection 4 mg (has no administration in time range)   insulin aspart U-100 pen 0-5 Units (has no administration in time range)   melatonin tablet 6 mg (6 mg Oral Given 3/3/25 0159)   hydrOXYzine HCL tablet 25 mg (has no administration in time range)   FLUoxetine capsule 40 mg (has no administration in time range)   cephALEXin capsule 500 mg (500 mg Oral Given 3/3/25 0509)   insulin glargine U-100 (Lantus) pen 5 Units (has no administration in time range)   QUEtiapine tablet 25 mg (has no administration in time range)  "  senna-docusate 8.6-50 mg per tablet 1 tablet (has no administration in time range)   polyethylene glycol packet 17 g (has no administration in time range)   metoprolol succinate (TOPROL-XL) 24 hr tablet 25 mg (has no administration in time range)   oxyCODONE immediate release tablet 5 mg (5 mg Oral Given 3/3/25 5499)   lactulose 20 gram/30 mL solution Soln 20 g (has no administration in time range)   furosemide injection 80 mg (80 mg Intravenous Given 3/3/25 3965)   hydrALAZINE tablet 50 mg (50 mg Oral Given 3/3/25 3159)   acetaminophen tablet 650 mg (650 mg Oral Given 3/2/25 5220)   furosemide injection 100 mg (100 mg Intravenous Given 3/3/25 0127)       BP (!) 159/72   Pulse 98   Temp 97.6 °F (36.4 °C) (Oral)   Resp 20   Ht 5' 9" (1.753 m)   Wt 85.7 kg (189 lb)   SpO2 100%   Breastfeeding No   BMI 27.91 kg/m²         Disposition: I anticipate patient will be admitted for shortness of breath despite dialysis treatment.  ______________________  Emmett Mendoza MD   Emergency Medicine Resident      UPDATE:   Chest x-ray concerning for bilateral pleural effusion.  Given patient's shortness of breath despite dialysis and pleural effusions, admitted patient to Hospital Medicine.  Patient is stable at time of admission.      :  Chest pain  Shortness of breath (Primary)  Pleural effusion    "

## 2025-03-03 NOTE — ASSESSMENT & PLAN NOTE
Echocardiogram with evidence of aortic stenosis that is  severe. The patient's most recent echocardiogram result is listed below. We will manage the valvular abnormality by GDMT.    Echo Saline Bubble? No  Result Date: 2/19/2025    Left Ventricle: The left ventricle is normal in size. There is mild   concentric hypertrophy. Septal motion is consistent with pacing. There is   normal systolic function with a visually estimated ejection fraction of 60   - 65%. Grade II diastolic dysfunction.    Right Ventricle: Mild right ventricular enlargement. Systolic function   is normal.    Left Atrium: Left atrium is moderately dilated.    Right Atrium: Right atrium is mildly dilated.    Aortic Valve: There is moderate stenosis. Aortic valve area by VTI is   1.0 cm². Aortic valve peak velocity is 3.2 m/s. Mean gradient is 24 mmHg.   The dimensionless index is 0.33.    Mitral Valve: There is mild stenosis. The mean pressure gradient across   the mitral valve is 12 mmHg at a heart rate of  bpm. There is mild   regurgitation.    Tricuspid Valve: There is moderate regurgitation.    Pulmonary Artery: The estimated pulmonary artery systolic pressure is   72 mmHg.    IVC/SVC: Intermediate venous pressure at 8 mmHg.    Pericardium: There is a trivial posterior effusion.        Echo  Result Date: 2/15/2025    Left Ventricle: The left ventricle is normal in size. There is moderate   concentric hypertrophy. There is hyperdynamic systolic function with a   visually estimated ejection fraction of 70 - 75%.    Right Ventricle: Normal right ventricular cavity size. Systolic   function is normal.    Left Atrium: Left atrium is moderately dilated.    Right Atrium: Right atrium is mildly dilated.    Aortic Valve: There is moderate stenosis. Aortic valve area by VTI is   0.8 cm². Aortic valve peak velocity is 3.4 m/s. Mean gradient is 27 mmHg.   The dimensionless index is 0.27.    Mitral Valve: There is mild stenosis. The mean pressure gradient  across   the mitral valve is 5 mmHg at a heart rate of 42  bpm. There is mild   regurgitation.    Tricuspid Valve: There is moderate regurgitation.    Pulmonary Artery: The estimated pulmonary artery systolic pressure is   49 mmHg.    IVC/SVC: Intermediate venous pressure at 8 mmHg.        Echo  Result Date: 10/31/2024    Left Ventricle: The left ventricle is normal in size. Normal wall   thickness. Normal wall motion. There is normal systolic function with a   visually estimated ejection fraction of 60 - 65%. Grade II diastolic   dysfunction. Elevated left ventricular filling pressure.    Right Ventricle: Normal right ventricular cavity size. Wall thickness   is normal. Systolic function is normal.    Left Atrium: Left atrium is severely dilated.    Aortic Valve: There is moderate aortic valve sclerosis. Moderately   restricted motion. There is moderate stenosis. Aortic valve area by VTI is   1.1 cm2. Aortic valve peak velocity is 3.1 m/s. Mean gradient is 20.1   mmHg. The dimensionless index is 0.37.    Mitral Valve: There is mild regurgitation.    Tricuspid Valve: There is mild regurgitation.    Pulmonary Artery: There is pulmonary hypertension. The estimated   pulmonary artery systolic pressure is 46 mmHg.    IVC/SVC: Normal venous pressure at 3 mmHg.

## 2025-03-03 NOTE — HPI
Lorena is a pleasant 75 yo woman w pmhx significant for DM2, fibromyalgia, lymphoma s/p chemo, HTN HLD CVA MI CAD PCI HFpEF anemia, KYA on CKD3b now dialysis dependent 2/22/25 after recent hospitalization at [facility] cardiogenic shock from complete heart block w heart cath on 2/19. Last HD session 3/1/25. She was admitted overnight for SOB and chest tightness since discharge and leg pain attributed to swelling. Cr stable overnight from 2.1 to 1.9. No IO recorded. Anuric since starting HD. CXR showed pulmonary edema and bilateral pleural effusions. She was given 100 mg IV lasix in ED.

## 2025-03-03 NOTE — HPI
Lorena Contreras is a 74 year old white woman with hypertension, hyperlipidemia, diabetes mellitus type 2, coronary artery disease, history of stroke, complete heart block status post dual chamber cardiac pacemaker placed 2/17/2025, pulmonary hypertension, chronic diastolic heart failure, peripheral vascular disease, chronic kidney disease stage 3b (on hemodialysis since 2/22/2025), anemia, follicular lymphoma diagnosed in 2009 treated with chemotherapy, gastroesophageal reflux disease, constipation, restless legs syndrome, fibromyalgia, anxiety, depression, history of right elbow dislocation repair in 2015, history of incision and drainage of right foot on 6/2/2021, history of open reduction and internal fixation of right pilon fracture on 8/14/2024 and right acetabulum fracture on 8/16/2024, history of right medial ankle fasciocutaneous flap and excisional debridement of left ankle medial wound on 9/25/2024, history of tonsillectomy in 1955. She uses a rolling walker and wheelchair to assist mobility. She lives in [place], [place]. She is . Her primary care physician is Dr. Jorge Paris.    She presented to [facility] on 3/2/2025 with worsening shortness of breath and chest tightness since being discharged from [facility] on 2/25/2025, where she was admitted on 2/15/2025 for complete heart block, acute kidney injury, and non-ST elevation myocardial infarction. She had mild improvement of symptoms after hemodialysis lasting several hours. She had associated dyspnea on exertion, significant swelling of bilateral lower extremities and abdomen, orthopnea, wheezing, fatigue, and generalized weakness. Chest tightness was non-radiating, substernal, and worse with musculoskeletal manipulation and deep breathing, but unaffected by exertion and positioning. She had diffuse leg pain due to swelling, generalized abdominal discomfort described as dull, nagging  pain, and nausea and vomiting. She has been anuric since starting dialysis. She had not missed any dialysis treatments.   In the emergency department, she had tachypnea and mild tachycardia. She was placed on 2 liters/minute of supplemental oxygen for comfort. Labs showed stable anemia and elevated WBC (90683/uL), bicarbonate 20 mmol/L, creatinine 2.1 mg/dL (baseline prior to starting dialysis was around 2.0), glucose 194 mg/dL, calcium 8.6 mg/dL, albumin 3.1 g/dL, BNP 1335 pg/mL, high sensitivity troponin 64 ng/L (repeat 57). EKG showed sinus rhythm with bigeminy, rate 70 bpm, no ST elevation or depression. Chest X-ray showed cardiomegaly with pulmonary edema and bilateral pleural effusions with aeration similar to mildly worse than 2/19/2025. Abdominal ultrasound showed cholelithiasis and gallbladder sludge without evidence of cholecystitis, distended gallbladder, hepatomegaly, bilateral pleural effusions. She was given acetaminophen and 100 mg of IV furosemide. She was admitted to Hospital Medicine Team S.

## 2025-03-03 NOTE — LETTER
AUTHORIZATION FOR RELEASE OF   CONFIDENTIAL INFORMATION    Dear roquemeradha Santa ( Uday ) ,    We are seeing Lorena Contreras, date of birth 1950, in the clinic at [facility]. Jorge Paris MD is the patient's PCP. Lorena Contreras has an outstanding lab/procedure at the time we reviewed her chart. In order to help keep her health information updated, she has authorized us to request the following medical record(s):        (  )  MAMMOGRAM                                      (  )  COLONOSCOPY      (  )  PAP SMEAR                                          (  )  OUTSIDE LAB RESULTS     (  )  DEXA SCAN                                          ( x ) EYE EXAM(diabetic) [id number]            (  )  FOOT EXAM                                          (  )  ENTIRE RECORD     (  )  OUTSIDE IMMUNIZATIONS                 (  )  _______________         Please fax records to [facility], Joseph, Marlon F., MD, Efax# [phone]      If you have any questions, please contact Stephanie Guerrero LPN [facility] at  [phone].     Patient Name: Lorena Contreras  : 1950  Patient Phone #: [phone]

## 2025-03-03 NOTE — ASSESSMENT & PLAN NOTE
Anemia is likely due to chronic disease due to Chronic Kidney Disease. Most recent hemoglobin and hematocrit are listed below.  Recent Labs     03/02/25  2104 03/03/25  0404   HGB 8.5* 7.5*   HCT 27.1* 24.4*       Plan  - Monitor serial CBC: Daily  - Transfuse PRBC if patient becomes hemodynamically unstable, symptomatic or H/H drops below 7/21.  - Patient has not received any PRBC transfusions to date  - Patient's anemia is currently stable

## 2025-03-03 NOTE — ASSESSMENT & PLAN NOTE
Patient's blood pressure range in the last 24 hours was: BP  Min: 128/61  Max: 159/72.The patient's inpatient anti-hypertensive regimen is listed below:  Current Antihypertensives  isosorbide mononitrate 24 hr tablet 60 mg, Daily, Oral  metoprolol succinate (TOPROL-XL) 24 hr tablet 25 mg, Daily, Oral  furosemide injection 80 mg, Every 12 hours, Intravenous  hydrALAZINE tablet 50 mg, Every 8 hours, Oral    Plan  - BP is controlled, no changes needed to their regimen

## 2025-03-03 NOTE — PLAN OF CARE
Inpatient Upgrade Note    Lorena Contreras has warranted treatment spanning two or more midnights of hospital level care for the management of heart failure. She continues to require IV antibiotics, IV diuresis, daily labs, supplemental oxygen, further testing/imaging, monitoring of vital signs, medication adjustments, and further evaluation by consultants. Her condition is also complicated by the following comorbidities:  x DM2, fibromyalgia, lymphoma s/p chemo, HTN, HLD, CVA, MI, CAD s/p PCIs, HFpEF, anemia, KYA on CKD3b newly on HD (2/22/25), and recent admission at [facility] (2/15- 2/25/25) for CHB, KYA, and NSTEMI  .

## 2025-03-03 NOTE — PROGRESS NOTES
[facility] - Emergency Dept  [facility] Medicine  Progress Note    Patient Name: Lorena Contreras  MRN: [id number]  Patient Class: IP- Inpatient   Admission Date: 3/2/2025  Length of Stay: 0 days  Attending Physician: Kari Smith MD  Primary Care Provider: Jorge Paris MD        Subjective     Principal Problem:Hypervolemia associated with renal insufficiency        HPI:  Lorena Contreras is a 74 y.o. female with a PMHx DM2, fibromyalgia, lymphoma s/p chemo, HTN, HLD, CVA, MI, CAD s/p PCIs, HFpEF, anemia, KYA on CKD3b newly on HD (2/22/25), and recent admission at [facility] (2/15- 2/25/25) for CHB, KYA, and NSTEMI who presents to the emergency department with a chief complaint of worsening shortness of breath and chest tightness since discharge. Reports mild improvement of symptoms after HD lasting several hours. Endorses associated OSMAN, significant swelling to BLE and abdomen, orthopnea, wheezing, fatigue, and generalized weakness. Patient reports the chest tightness is non-radiating, substernal and is worse with MSK manipulation and deep breathing, but unaffected by exertion or positioning. Also reports diffuse pain to legs that she attributes to the swelling. She notes generalized abdominal discomfort, described as a dull, nagging pain. Does report nausea and several episodes of vomiting. States since initiation of HD she has been anuric. No missed HD treatments, last HD session 3/1/25. She denies fever/chills, cough, congestion, headache, or lightheadedness/dizziness.     In the ED, patient with tachypnea and mild tachycardia otherwise vitals stable, afebrile. No documented hypoxia, but placed on 2L O2 via NC for comfort. CBC with stable anemia and WBC 16.29. PT/INR WNL. Na 135. Bicarb 20. Cr 2.1 (bl prior to HD initiation ~2.0). Glucose 194. Calcium 8.6. Albumin 3.1. BNP 1,335. HS troponin 64>>57. EKG shows SR w/ bigeminy, 70 bpm, no ST elevation or depression. CXR with cardiomegaly with pulmonary  edema and bilateral pleural effusions. Aeration is similar to mildly worse when compared with 02/19/2025. New right-sided central venous catheter overlying the SVC. Abd US with cholelithiasis and gallbladder sludge without evidence of cholecystitis. Distended gallbladder. Hepatomegaly. Bilateral pleural effusion. The patient received tylenol and 100mg IV lasix.    Overview/Hospital Course:  2/4- new HD pt with volume overload. Has PPM and Hx of complete HB. /72 VSS. GDMT being resumed.  Metoprolol resumed, hydralazine dose reduced. Receiving lasix. Plan for volume removal in HD. UO 300ml and previously hadn't urinated since being started on HD in the hospital. Diuresing lasix 80 mg IV bid.  Pain: Takes oxycod 10 at home and she is asking for something stronger- will increase oxycod dose 5-> 10 mg now.     Interval History: see above    Review of Systems   Constitutional:  Positive for activity change. Negative for appetite change and fever.   HENT:  Negative for trouble swallowing.    Respiratory:  Positive for shortness of breath. Negative for cough.    Cardiovascular:  Negative for chest pain and leg swelling.   Gastrointestinal:  Positive for abdominal distention and abdominal pain. Negative for constipation, diarrhea and nausea.   Genitourinary:  Negative for difficulty urinating.   Musculoskeletal:  Negative for back pain, gait problem and joint swelling.   Skin:         Bottom discomfort related to pressure in bed, daniel w mattress overlay in the ED   Neurological:  Negative for numbness.   Psychiatric/Behavioral:  Negative for behavioral problems and confusion.      Objective:     Vital Signs (Most Recent):  Temp: 97.6 °F (36.4 °C) (03/03/25 0700)  Pulse: 99 (03/03/25 1000)  Resp: (!) 30 (03/03/25 1000)  BP: (!) 157/88 (03/03/25 1000)  SpO2: 100 % (03/03/25 1000) Vital Signs (24h Range):  Temp:  [97.5 °F (36.4 °C)-98.2 °F (36.8 °C)] 97.6 °F (36.4 °C)  Pulse:  [] 99  Resp:  [15-30] 30  SpO2:  [97  %-100 %] 100 %  BP: (128-170)/(60-88) 157/88     Weight: 85.7 kg (189 lb)  Body mass index is 27.91 kg/m².  No intake or output data in the 24 hours ending 03/03/25 1158      Physical Exam  Constitutional:       General: She is not in acute distress.     Appearance: Normal appearance.   HENT:      Head: Normocephalic and atraumatic.      Nose: Nose normal.      Mouth/Throat:      Mouth: Mucous membranes are moist.   Eyes:      General: No scleral icterus.     Extraocular Movements: Extraocular movements intact.      Pupils: Pupils are equal, round, and reactive to light.   Cardiovascular:      Rate and Rhythm: Normal rate and regular rhythm.      Pulses: Normal pulses.      Heart sounds: Murmur heard.      No gallop.   Pulmonary:      Effort: Pulmonary effort is normal.      Breath sounds: Normal breath sounds. No wheezing, rhonchi or rales.      Comments: Decreased BS at bases bilateraly  Chest:      Chest wall: No tenderness.   Abdominal:      General: Abdomen is flat. Bowel sounds are normal. There is no distension.      Palpations: Abdomen is soft.      Tenderness: There is no abdominal tenderness. There is no right CVA tenderness, left CVA tenderness, guarding or rebound.   Musculoskeletal:         General: No swelling, tenderness or deformity. Normal range of motion.      Cervical back: Normal range of motion and neck supple. No rigidity or tenderness.      Right lower leg: Edema present.      Left lower leg: Edema present.   Skin:     General: Skin is warm and dry.      Coloration: Skin is not jaundiced or pale.      Findings: No erythema or rash.   Neurological:      General: No focal deficit present.      Mental Status: She is alert. Mental status is at baseline.      Cranial Nerves: No cranial nerve deficit.      Motor: No weakness.   Psychiatric:         Thought Content: Thought content normal.         Judgment: Judgment normal.             Significant Labs: All pertinent labs within the past 24 hours  have been reviewed.  CBC:   Recent Labs   Lab 03/02/25 2104 03/03/25  0404   WBC 16.29* 11.13   HGB 8.5* 7.5*   HCT 27.1* 24.4*    251     CMP:   Recent Labs   Lab 03/02/25 2104 03/03/25  0520   * 135*   K 3.5 3.2*    105   CO2 20* 23   * 123*   BUN 19 20   CREATININE 2.1* 1.9*   CALCIUM 8.6* 8.2*   PROT 7.0 6.3   ALBUMIN 3.1* 2.8*   BILITOT 0.6 0.6   ALKPHOS 146 129   AST 34 28   ALT 13 12   ANIONGAP 13 7*       Significant Imaging: I have reviewed all pertinent imaging results/findings within the past 24 hours.    Assessment and Plan     * Hypervolemia associated with renal insufficiency  Acute kidney injury superimposed on stage 3b chronic kidney disease   Recently diagnosed with KYA due to cardiogenic shock secondary to complete heart block, initially improved. However developed NSTEMI and underwent LHC 2/19 and was initiated on HD on 2/22 while admitted at [facility]. Pt reports worsening SOB, diffuse body swelling, and orthopnea since discharge. States since initiation of HD she has been anuric. No missed HD treatments, last HD session 3/1/25. Of note her daily lasix was discontinued at discharge.    Pt reports Lasix was discontinued after dc last week.     Baseline creatinine is ~2.0. Most recent creatinine and eGFR are listed below.  Recent Labs     03/02/25 2104   CREATININE 2.1*   EGFRNORACEVR 24.3*      Plan  - KYA is stable  - Avoid nephrotoxins and renally dose meds for GFR listed above  - Monitor urine output, serial BMP, and adjust therapy as needed  - Consult nephrology for HD.   - Trial lasix while awaiting HD.  - UA, urine Na, and urine Protein/Cr ratio  - Renal US pending.  - Daily wt/ strict I&Os  - Renal diet/ 1.5L FR    3/4- lasix 80 mg IV bid. HD per nephrology    Acute on chronic diastolic heart failure  Pulmonary hypertension -- echo 11/2019   Aortic stenosis  is severe, with mitral regurgitation, suspect cardiorenal syndrome due to acute CHF.   Has LE edema,  pericardial and pleural effusions.     Patient is identified as having Diastolic (HFpEF) heart failure that is Acute on Chronic. CHF is currently uncontrolled due to volume overload due to: Continued edema of extremities, Rales/crackles on pulmonary exam, and Pulmonary edema/pleural effusion on CXR. Latest ECHO performed and demonstrates- Results for orders placed during the hospital encounter of 02/15/25    Echo Saline Bubble? No    Interpretation Summary    Left Ventricle: The left ventricle is normal in size. There is mild concentric hypertrophy. Septal motion is consistent with pacing. There is normal systolic function with a visually estimated ejection fraction of 60 - 65%. Grade II diastolic dysfunction.    Right Ventricle: Mild right ventricular enlargement. Systolic function is normal.    Left Atrium: Left atrium is moderately dilated.    Right Atrium: Right atrium is mildly dilated.    Aortic Valve: There is moderate stenosis. Aortic valve area by VTI is 1.0 cm². Aortic valve peak velocity is 3.2 m/s. Mean gradient is 24 mmHg. The dimensionless index is 0.33.    Mitral Valve: There is mild stenosis. The mean pressure gradient across the mitral valve is 12 mmHg at a heart rate of  bpm. There is mild regurgitation.    Tricuspid Valve: There is moderate regurgitation.    Pulmonary Artery: The estimated pulmonary artery systolic pressure is 72 mmHg.    IVC/SVC: Intermediate venous pressure at 8 mmHg.    Pericardium: There is a trivial posterior effusion.  . Continue Nitrate/Vasodilator and monitor clinical status closely. Monitor on telemetry. Patient is off CHF pathway.  Monitor strict Is&Os and daily weights.  Place on fluid restriction of 1.5 L. Continue to stress to patient importance of self efficacy and  on diet for CHF. Last BNP reviewed- and noted below   Recent Labs   Lab 03/02/25 2104   BNP 1,335*     -Received 100mg IV lasix x1, will monitor UO. Can continue IV lasix bid if pt has UO.  -Will  add back metoprolol 25mg qd.  -ECHO 3/3:    Left Ventricle: The left ventricle is normal in size. Ventricular mass is normal. Normal wall thickness. There is concentric remodeling. There is normal systolic function with a visually estimated ejection fraction of 55%. Quantitated ejection fraction is 50%. There is diastolic dysfunction. Elevated left ventricular filling pressure.    Left Ventricle: Regional wall motion abnormalities present.    Right Ventricle: The right ventricle is normal in size. Systolic function is borderline low.    Left Atrium: severely dilated    Aortic Valve: There is moderate aortic valve sclerosis. Severely restricted motion. There is severe stenosis. Aortic valve area by VTI is 0.7 cm2. Aortic valve peak velocity is 4.2 m/s. Mean gradient is 40 mmHg. The dimensionless index is 0.23.    Mitral Valve: There is mild anterior leaflet sclerosis. There is severe posterior mitral annular calcification present. There is mild to moderate stenosis. The mean pressure gradient across the mitral valve is 6 mmHg at a heart rate of 89 bpm. There is mild to moderate MITRAL regurgitation. MVA 1.9cm2 by planimetry    Tricuspid Valve: There is moderate regurgitation.    Pulmonary Artery: The estimated pulmonary artery systolic pressure is 73 mmHg.    IVC/SVC: Elevated venous pressure at 15 mmHg.    Pericardium: There is a moderate circumferential effusion. Bilateral pleural effusions.    <30% respiratory variation across mitral valve, no diastolic RV collapse, however effusion moderate and new, would continue to monitor closely.      Acute kidney injury superimposed on stage 3b chronic kidney disease  KYA is likely due to  cardiorenal . Baseline creatinine is unknown. Most recent creatinine and eGFR are listed below.  Recent Labs     03/02/25  2104 03/03/25  0520   CREATININE 2.1* 1.9*   EGFRNORACEVR 24.3* 27.4*      Plan  - KYA is improving  - Avoid nephrotoxins and renally dose meds for GFR listed above  -  "Monitor urine output, serial BMP, and adjust therapy as needed  -       Type 2 diabetes mellitus with stage 3b chronic kidney disease, with long-term current use of insulin  Patient's FSGs are controlled on current hypoglycemics.   Last A1c reviewed-   Lab Results   Component Value Date    HGBA1C 6.6 (H) 02/27/2025     Most recent fingerstick glucose reviewed- No results for input(s): "POCTGLUCOSE" in the last 24 hours.  Current correctional scale  Low  Maintain anti-hyperglycemic dose as follows-   Antihyperglycemics (From admission, onward)      Start     Stop Route Frequency Ordered    03/03/25 2100  insulin glargine U-100 (Lantus) pen 5 Units         -- SubQ Nightly 03/03/25 0233    03/03/25 0227  insulin aspart U-100 pen 0-5 Units         -- SubQ Before meals & nightly PRN 03/03/25 0128        -Accuchecks AC/HS  -Diabetic/renal diet    CHB (complete heart block)  PPM, on metoprolol      Pulmonary hypertension -- echo 11/2019  ECHO 2/25-he estimated pulmonary artery systolic pressure is 72 mmHg.      Follicular lymphoma  S/p chemo in 2010. In remission.     Cardiac pacemaker in situ  CHB (complete heart block)  -s/p ppm on 02/17, taking keflex tid (EOT 3/3)  -no acute issue  -cardiac monitoring    Coronary artery disease of native artery of native heart with stable angina pectoris  Patient with known CAD s/p stent placement and CABG, which is controlled Will continue  brilinta, ASA, and Statin and monitor for S/Sx of angina/ACS. Continue to monitor on telemetry.   02/19   -S/p heart catheterization on 02/19, [facility] with patent stents and moderate LAD/RCA disease - no culprit identified     Anxiety  -Chronic issue.  -PRN hydroxyzine.    Primary hypertension  Patient's blood pressure range in the last 24 hours was: BP  Min: 128/61  Max: 159/72.The patient's inpatient anti-hypertensive regimen is listed below:  Current Antihypertensives  isosorbide mononitrate 24 hr tablet 60 mg, Daily, Oral  metoprolol succinate " (TOPROL-XL) 24 hr tablet 25 mg, Daily, Oral  furosemide injection 80 mg, Every 12 hours, Intravenous  hydrALAZINE tablet 50 mg, Every 8 hours, Oral    Plan  - BP is controlled, no changes needed to their regimen    Peripheral vascular disease in diabetes mellitus -- CLEMENT 05/2019  -Chronic issue.  -Continue ASA and statin.    Mixed hyperlipidemia   Patient is chronically on statin.will continue for now. Monitor clinically. Last LDL was   Lab Results   Component Value Date    LDLCALC 105.6 01/14/2025        Aortic stenosis  Echocardiogram with evidence of aortic stenosis that is  severe. The patient's most recent echocardiogram result is listed below. We will manage the valvular abnormality by GDMT.    Echo Saline Bubble? No  Result Date: 2/19/2025    Left Ventricle: The left ventricle is normal in size. There is mild   concentric hypertrophy. Septal motion is consistent with pacing. There is   normal systolic function with a visually estimated ejection fraction of 60   - 65%. Grade II diastolic dysfunction.    Right Ventricle: Mild right ventricular enlargement. Systolic function   is normal.    Left Atrium: Left atrium is moderately dilated.    Right Atrium: Right atrium is mildly dilated.    Aortic Valve: There is moderate stenosis. Aortic valve area by VTI is   1.0 cm². Aortic valve peak velocity is 3.2 m/s. Mean gradient is 24 mmHg.   The dimensionless index is 0.33.    Mitral Valve: There is mild stenosis. The mean pressure gradient across   the mitral valve is 12 mmHg at a heart rate of  bpm. There is mild   regurgitation.    Tricuspid Valve: There is moderate regurgitation.    Pulmonary Artery: The estimated pulmonary artery systolic pressure is   72 mmHg.    IVC/SVC: Intermediate venous pressure at 8 mmHg.    Pericardium: There is a trivial posterior effusion.        Echo  Result Date: 2/15/2025    Left Ventricle: The left ventricle is normal in size. There is moderate   concentric hypertrophy. There is  hyperdynamic systolic function with a   visually estimated ejection fraction of 70 - 75%.    Right Ventricle: Normal right ventricular cavity size. Systolic   function is normal.    Left Atrium: Left atrium is moderately dilated.    Right Atrium: Right atrium is mildly dilated.    Aortic Valve: There is moderate stenosis. Aortic valve area by VTI is   0.8 cm². Aortic valve peak velocity is 3.4 m/s. Mean gradient is 27 mmHg.   The dimensionless index is 0.27.    Mitral Valve: There is mild stenosis. The mean pressure gradient across   the mitral valve is 5 mmHg at a heart rate of 42  bpm. There is mild   regurgitation.    Tricuspid Valve: There is moderate regurgitation.    Pulmonary Artery: The estimated pulmonary artery systolic pressure is   49 mmHg.    IVC/SVC: Intermediate venous pressure at 8 mmHg.        Echo  Result Date: 10/31/2024    Left Ventricle: The left ventricle is normal in size. Normal wall   thickness. Normal wall motion. There is normal systolic function with a   visually estimated ejection fraction of 60 - 65%. Grade II diastolic   dysfunction. Elevated left ventricular filling pressure.    Right Ventricle: Normal right ventricular cavity size. Wall thickness   is normal. Systolic function is normal.    Left Atrium: Left atrium is severely dilated.    Aortic Valve: There is moderate aortic valve sclerosis. Moderately   restricted motion. There is moderate stenosis. Aortic valve area by VTI is   1.1 cm2. Aortic valve peak velocity is 3.1 m/s. Mean gradient is 20.1   mmHg. The dimensionless index is 0.37.    Mitral Valve: There is mild regurgitation.    Tricuspid Valve: There is mild regurgitation.    Pulmonary Artery: There is pulmonary hypertension. The estimated   pulmonary artery systolic pressure is 46 mmHg.    IVC/SVC: Normal venous pressure at 3 mmHg.           Anemia  Anemia is likely due to chronic disease due to Chronic Kidney Disease. Most recent hemoglobin and hematocrit are listed  below.  Recent Labs     03/02/25  2104 03/03/25  0404   HGB 8.5* 7.5*   HCT 27.1* 24.4*       Plan  - Monitor serial CBC: Daily  - Transfuse PRBC if patient becomes hemodynamically unstable, symptomatic or H/H drops below 7/21.  - Patient has not received any PRBC transfusions to date  - Patient's anemia is currently stable    Constipation  Pt reports issues with constipation since recent discharge and has been taking colace without success.  -Will start bowel regimen.    GERD (gastroesophageal reflux disease)  -Chronic, stable.  -Continue PPI.      VTE Risk Mitigation (From admission, onward)           Ordered     heparin (porcine) injection 5,000 Units  Every 8 hours         03/03/25 0128     IP VTE HIGH RISK PATIENT  Once         03/03/25 0128     Place sequential compression device  Until discontinued         03/03/25 0128                    Discharge Planning   MIKHAIL:      Code Status: Full Code   Medical Readiness for Discharge Date:              Kari Smith MD  Department of Hospital Medicine   [facility] - Emergency Dept

## 2025-03-03 NOTE — ASSESSMENT & PLAN NOTE
Patient's blood pressure range in the last 24 hours was: BP  Min: 147/68  Max: 157/71.The patient's inpatient anti-hypertensive regimen is listed below:  Current Antihypertensives  isosorbide mononitrate 24 hr tablet 60 mg, Daily, Oral  metoprolol succinate (TOPROL-XL) 24 hr tablet 25 mg, Daily, Oral  furosemide injection 80 mg, Every 12 hours, Intravenous  hydrALAZINE tablet 50 mg, Every 8 hours, Oral    Plan  - BP is controlled, no changes needed to their regimen

## 2025-03-03 NOTE — ASSESSMENT & PLAN NOTE
Acute kidney injury superimposed on stage 3b chronic kidney disease   Recently diagnosed with YKA due to cardiogenic shock secondary to complete heart block, initially improved. However developed NSTEMI and underwent LHC 2/19 and was initiated on HD on 2/22 while admitted at [facility]. Pt reports worsening SOB, diffuse body swelling, and orthopnea since discharge. States since initiation of HD she has been anuric. No missed HD treatments, last HD session 3/1/25. Of note her daily lasix was discontinued at discharge.    Pt reports Lasix was discontinued after dc last week.     Baseline creatinine is ~2.0. Most recent creatinine and eGFR are listed below.  Recent Labs     03/02/25  2104   CREATININE 2.1*   EGFRNORACEVR 24.3*      Plan  - KYA is stable  - Avoid nephrotoxins and renally dose meds for GFR listed above  - Monitor urine output, serial BMP, and adjust therapy as needed  - Consult nephrology for HD.   - Trial lasix while awaiting HD.  - UA, urine Na, and urine Protein/Cr ratio  - Renal US pending.  - Daily wt/ strict I&Os  - Renal diet/ 1.5L FR    3/4- lasix 80 mg IV bid. HD per nephrology

## 2025-03-03 NOTE — ASSESSMENT & PLAN NOTE
Acute kidney injury superimposed on stage 3b chronic kidney disease   Recently diagnosed with KYA due to cardiogenic shock secondary to complete heart block, initially improved. However developed NSTEMI and underwent LHC 2/19 and was initiated on HD on 2/22 while admitted at [facility]. Pt reports worsening SOB, diffuse body swelling, and orthopnea since discharge. States since initiation of HD she has been anuric. No missed HD treatments, last HD session 3/1/25. Of note her daily lasix was discontinued at discharge.    Baseline creatinine is ~2.0. Most recent creatinine and eGFR are listed below.  Recent Labs     03/02/25 2104   CREATININE 2.1*   EGFRNORACEVR 24.3*      Plan  - KYA is stable  - Avoid nephrotoxins and renally dose meds for GFR listed above  - Monitor urine output, serial BMP, and adjust therapy as needed  - Consult nephrology for HD.   - Trial lasix while awaiting HD.  - UA, urine Na, and urine Protein/Cr ratio  - Renal US pending.  - Daily wt/ strict I&Os  - Renal diet/ 1.5L FR

## 2025-03-03 NOTE — ASSESSMENT & PLAN NOTE
Patient with known CAD s/p stent placement and CABG, which is controlled Will continue  brilinta, ASA, and Statin and monitor for S/Sx of angina/ACS. Continue to monitor on telemetry.   02/19   -S/p heart catheterization on 02/19, [facility] with patent stents and moderate LAD/RCA disease - no culprit identified

## 2025-03-03 NOTE — ED PROVIDER NOTES
Source of History:  Patient and chart review    Chief complaint:  Shortness of Breath (Shortness of breath, and chest pain, just started on dialysis last week )      HPI:  Lorena Contreras is a 74 y.o. female with a DM2, fibromyalgia, lymphoma s/p chemo, HTN, HLD, CVA, MI, CAD s/p PCIs, HFpEF, anemia, ESRD newly on HD (2/21/25), and recent admission (2/15 - 2/25/25) for CHB and KYA who presents to the emergency department with a chief complaint of worsening shortness of breath and chest tightness since discharge. Patient reports the chest tightness is non-radiating, substernal and is worse with MSK manipulation and deep breath, but unaffected by exertion or positioning. She reports feeling perpetually short of breath with some short-lived relief (several hours) after dialysis. Denies recent fevers/chills, nausea/vomiting, productive cough, sick contacts, muscle aches, sore throat or changes in mentation.  Patient reports feeling perpetually anxious secondary to the shortness of breath.    Review of patient's allergies indicates:   Allergen Reactions    Novolin 70/30 (semi-synthetic) Nausea And Vomiting     Severe vomiting on Relion 70/30    Sulfa (sulfonamide antibiotics) Anaphylaxis    Talwin [pentazocine lactate] Anaphylaxis    Victoza [liraglutide] Nausea And Vomiting    Glipizide Nausea Only    Codeine     Influenza virus vaccines Hives    Iodine and iodide containing products Hives    Levetiracetam Itching    Lyrica [pregabalin] Hallucinations    Neurontin [gabapentin]      Possible associated myoclonic jerk    Rituxan [rituximab] Hives    Zoloft [sertraline] Nausea And Vomiting       Medications Ordered Prior to Encounter[1]    PMH:  As per HPI and below:  Past Medical History:   Diagnosis Date    Age-related osteoporosis with current pathological fracture with routine healing 11/13/2024    Allergy     Altered mental status 06/19/2022    DYSARTHRIA, SPASTIC MOVEMENTS & DIFFICULTY SWALLOWING    Anemia      Anxiety     Arthritis     Cataract     both removed    Colon polyps     Coronary artery disease     Depression     Diabetes mellitus, type II     Disorder of kidney and ureter     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/2023    Fibromyalgia     Follicular lymphoma     GERD (gastroesophageal reflux disease)     HTN (hypertension)     Hyperlipidemia     MI (myocardial infarction) 03/2019    Personal history of colonic polyps     Restless leg syndrome     Stroke      Past Surgical History:   Procedure Laterality Date    A-V CARDIAC PACEMAKER INSERTION N/A 2/17/2025    Procedure: INSERTION, CARDIAC PACEMAKER, DUAL CHAMBER;  Surgeon: Karl Rico MD;  Location: [facility] CATH LAB;  Service: Cardiology;  Laterality: N/A;    APPLICATION OF WOUND VACUUM-ASSISTED CLOSURE DEVICE Right 9/25/2024    Procedure: APPLICATION, WOUND VAC;  Surgeon: Neto Davalos MD;  Location: [facility] OR 2ND FLR;  Service: Orthopedics;  Laterality: Right;    COLONOSCOPY  11/07/2012    Colon polyp found; repeat in 5 years    COLONOSCOPY N/A 11/4/2024    Procedure: COLONOSCOPY;  Surgeon: David Castaneda MD;  Location: [facility] ENDO (2ND FLR);  Service: Endoscopy;  Laterality: N/A;    DEBRIDEMENT OF LOCAL FLAP Right 9/25/2024    Procedure: DEBRIDEMENT, LOCAL FLAP;  Surgeon: Neto Davalos MD;  Location: [facility] OR 2ND FLR;  Service: Orthopedics;  Laterality: Right;    ELBOW SURGERY Right 2015    dislocation repair     ESOPHAGOGASTRODUODENOSCOPY  11/07/2012    atrophic gastritis, H pylori testing negative    INCISION AND DRAINAGE FOOT Right 6/2/2021    Procedure: INCISION AND DRAINAGE, FOOT, bone biopsy;  Surgeon: Quiana Penn DPM;  Location: [facility] OR;  Service: Podiatry;  Laterality: Right;    IRRIGATION AND DEBRIDEMENT OF LOWER EXTREMITY Right 9/25/2024    Procedure: IRRIGATION AND DEBRIDEMENT, LOWER EXTREMITY; slider table, supine, bone foam, cysto tubing, 6L NS/dakins/peroxide, culture swabs;  Surgeon: Neto Davalos MD;   Location: [facility] OR [facility] FLR;  Service: Orthopedics;  Laterality: Right;    KNEE SURGERY Bilateral 2015    scoped    LEFT HEART CATHETERIZATION Left 3/29/2019    Procedure: Left heart cath;  Surgeon: Bladimir Barbosa MD;  Location: [facility] CATH LAB;  Service: Cardiology;  Laterality: Left;    LEFT HEART CATHETERIZATION Left 11/18/2019    Procedure: Left heart cath;  Surgeon: Karl Rico MD;  Location: [facility] CATH LAB;  Service: Cardiology;  Laterality: Left;    LEFT HEART CATHETERIZATION Left 1/8/2020    Procedure: Left heart cath, right radial, noon start;  Surgeon: Christos Monreal MD;  Location: [facility] CATH LAB;  Service: Cardiology;  Laterality: Left;  RN Pre Op 1-6-20.  To be admitted 1-7-20 sor Aspirin Disensitation    LEFT HEART CATHETERIZATION Left 2/19/2025    Procedure: Left heart cath;  Surgeon: Karl Rico MD;  Location: [facility] CATH LAB;  Service: Cardiology;  Laterality: Left;    OPEN REDUCTION AND INTERNAL FIXATION (ORIF) OF FRACTURE OF ACETABULUM Right 8/16/2024    Procedure: ORIF, FRACTURE, ACETABULUM;  Surgeon: Neto Davalos MD;  [facility];  Service: Orthopedics;  Laterality: Right;  anterior and lateral pelvic incisions    OPEN REDUCTION AND INTERNAL FIXATION (ORIF) OF PILON FRACTURE Right 8/14/2024    Procedure: ORIF, FRACTURE, PILON;  Surgeon: Neto Davalos MD;  [facility];  Service: Orthopedics;  Laterality: Right;    TONSILLECTOMY  1955    ULTRASOUND GUIDANCE  1/8/2020    Procedure: Ultrasound Guidance;  Surgeon: Christos Monreal MD;  Location: [facility] CATH LAB;  Service: Cardiology;;       Social History     Socioeconomic History    Marital status:     Number of children: 2   Occupational History    Occupation: house wife    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  2 dtr.  Lives with GS.  3 cats and a dog.  Retired.  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  Lives in house, 1 story and 4 steps up and has a ramp.      Enjoys crafting.  Unable to bowl due to myalgias.       Social Drivers of Health     Financial Resource Strain: Medium Risk (2/26/2025)    Overall Financial Resource Strain (CARDIA)     Difficulty of Paying Living Expenses: Somewhat hard   Food Insecurity: No Food Insecurity (2/26/2025)    Hunger Vital Sign     Worried About Running Out of Food in the Last Year: Never true     Ran Out of Food in the Last Year: Never true   Transportation Needs: Unmet Transportation Needs (2/26/2025)    PRAPARE - Transportation     Lack of Transportation (Medical): Yes     Lack of Transportation (Non-Medical): No   Physical Activity: Inactive (2/26/2025)    Exercise Vital Sign     Days of Exercise per Week: 0 days     Minutes of Exercise per Session: 0 min   Stress: Stress Concern Present (2/26/2025)    [employer] - Occupational Stress Questionnaire     Feeling of Stress : Very much   Housing Stability: Low Risk  (2/26/2025)    Housing Stability Vital Sign     Unable to Pay for Housing in the Last Year: No     Number of Times Moved in the Last Year: 0     Homeless in the Last Year: No       Family History   Problem Relation Name Age of Onset    Cancer Mother          colon    Heart disease Mother      Cancer Father          lung    Lung cancer Brother      Diabetes Sister      Hypertension Sister      Allergy (severe) Daughter erin     No Known Problems Daughter      Stroke Neg Hx      Hyperlipidemia Neg Hx         Physical Exam:      Vitals:    03/03/25 1300   BP: 132/70   Pulse: 92   Resp: (!) 28   Temp: 98 °F (36.7 °C)     Physical Exam    Gen: No acute distress  Mental Status:  Alert and oriented, answering questions appropriately  Skin: Warm, dry. No rashes seen.  Eyes:  No conjunctival injection.  Pulm: Mild basilar rales bilaterally. No increased work of breathing.  No significant tachypnea.  No conversational dyspnea.    CV:  Regular rate and rhythm, no murmurs/rubs/gallops  Abd: Soft. Distended. Nontender to palpation. No guarding or rebound.   MSK: No deformities. WWP.  1+ pitting edema bilaterally, no palpable cords  Neuro: Awake. Speech normal. No focal neuro deficit observed.    Procedures  Laboratory Studies:  Labs Reviewed   CBC W/ AUTO DIFFERENTIAL - Abnormal       Result Value    WBC 16.29 (*)     RBC 3.00 (*)     Hemoglobin 8.5 (*)     Hematocrit 27.1 (*)     MCV 90      MCH 28.3      MCHC 31.4 (*)     RDW 15.3 (*)     Platelets 287      MPV 11.9      Immature Granulocytes 0.6 (*)     Gran # (ANC) 13.7 (*)     Immature Grans (Abs) 0.09 (*)     Lymph # 1.2      Mono # 1.2 (*)     Eos # 0.1      Baso # 0.09      nRBC 0      Gran % 84.0 (*)     Lymph % 7.1 (*)     Mono % 7.2      Eosinophil % 0.5      Basophil % 0.6      Differential Method Automated      Narrative:     Release to patient->Immediate   COMPREHENSIVE METABOLIC PANEL - Abnormal    Sodium 135 (*)     Potassium 3.5      Chloride 102      CO2 20 (*)     Glucose 194 (*)     BUN 19      Creatinine 2.1 (*)     Calcium 8.6 (*)     Total Protein 7.0      Albumin 3.1 (*)     Total Bilirubin 0.6      Alkaline Phosphatase 146      AST 34      ALT 13      eGFR 24.3 (*)     Anion Gap 13      Narrative:     Release to patient->Immediate   TROPONIN I HIGH SENSITIVITY - Abnormal    Troponin I High Sensitivity 64 (*)     Narrative:     Release to patient->Immediate   B-TYPE NATRIURETIC PEPTIDE - Abnormal    BNP 1,335 (*)     Narrative:     Release to patient->Immediate   TROPONIN I HIGH SENSITIVITY - Abnormal    Troponin I High Sensitivity 57 (*)    CBC W/ AUTO DIFFERENTIAL - Abnormal    WBC 11.13      RBC 2.65 (*)     Hemoglobin 7.5 (*)     Hematocrit 24.4 (*)     MCV 92      MCH 28.3      MCHC 30.7 (*)     RDW 15.3 (*)      Platelets 251      MPV 11.8      Immature Granulocytes 0.4      Gran # (ANC) 8.5 (*)     Immature Grans (Abs) 0.05 (*)     Lymph # 1.7      Mono # 0.8      Eos # 0.1      Baso # 0.04      nRBC 0      Gran % 75.9 (*)     Lymph % 15.4 (*)     Mono % 7.1      Eosinophil % 0.8      Basophil % 0.4      Differential Method Automated      Narrative:     Add on Phos per Dr. Smith @ 04:23 am to order # [id number]   C-REACTIVE PROTEIN - Abnormal    CRP 17.9 (*)    PROTEIN / CREATININE RATIO, URINE - Abnormal    Protein, Urine Random 134 (*)     Creatinine, Urine 94.0      Prot/Creat Ratio, Urine 1.43 (*)     Narrative:     Specimen Source->Urine   URINALYSIS, REFLEX TO URINE CULTURE - Abnormal    Specimen UA Urine, Catheterized      Color, UA Yellow      Appearance, UA Clear      pH, UA 6.0      Specific Gravity, UA 1.015      Protein, UA 2+ (*)     Glucose, UA Negative      Ketones, UA Negative      Bilirubin (UA) Negative      Occult Blood UA Negative      Nitrite, UA Negative      Leukocytes, UA Trace (*)     Narrative:     Specimen Source->Urine   COMPREHENSIVE METABOLIC PANEL - Abnormal    Sodium 135 (*)     Potassium 3.2 (*)     Chloride 105      CO2 23      Glucose 123 (*)     BUN 20      Creatinine 1.9 (*)     Calcium 8.2 (*)     Total Protein 6.3      Albumin 2.8 (*)     Total Bilirubin 0.6      Alkaline Phosphatase 129      AST 28      ALT 12      eGFR 27.4 (*)     Anion Gap 7 (*)    URINALYSIS MICROSCOPIC - Abnormal    RBC, UA 1      WBC, UA 1      Bacteria None      Squam Epithel, UA 2      Hyaline Casts, UA 7 (*)     Microscopic Comment SEE COMMENT      Narrative:     Specimen Source->Urine   POCT GLUCOSE - Abnormal    POCT Glucose 149 (*)    HIV 1 / 2 ANTIBODY    HIV 1/2 Ag/Ab Non-reactive      Narrative:     Release to patient->Immediate   PROTIME-INR    Prothrombin Time 11.6      INR 1.1     PROCALCITONIN    Procalcitonin 0.16     LACTIC ACID, PLASMA    Lactate (Lactic Acid) 0.7     SODIUM, URINE, RANDOM     Sodium, Urine 48      Narrative:     Specimen Source->Urine   PHOSPHORUS   PHOSPHORUS    Phosphorus 2.7     MAGNESIUM    Magnesium 2.0     POCT GLUCOSE, HAND-HELD DEVICE   POCT GLUCOSE, HAND-HELD DEVICE       Imaging Results              US Retroperitoneal Complete (Final result)  Result time 03/03/25 09:53:27      Final result by Fabien Marino MD (03/03/25 09:53:27)                   Impression:      Signs of medical renal disease bilaterally.  No hydronephrosis.    Suspected small nonobstructing renal stones bilaterally.    Left pleural effusion.      Electronically signed by: Fabien Marino  Date:    03/03/2025  Time:    09:53               Narrative:    EXAMINATION:  US RETROPERITONEAL COMPLETE    CLINICAL HISTORY:  Acute on chronic kidney disease;    TECHNIQUE:  Ultrasound of the kidneys and urinary bladder was performed including color flow and Doppler evaluation of the kidneys.    COMPARISON:  None.    FINDINGS:  Right kidney: The right kidney measures 10.7 cm. No cortical thinning. No loss of corticomedullary distinction.  Diminished perfusion.  Resistive index measures 1.0.  No mass. Few echogenic foci with posterior shadowing measuring up to 0.5 cm.  No hydronephrosis.    Left kidney: The left kidney measures 11.0 cm. No cortical thinning. No loss of corticomedullary distinction.  Diminished perfusion.  Resistive index measures 1.0.  No mass.  Few echogenic foci with posterior shadowing measuring up to 0.3 cm.   No hydronephrosis.    Splenic resistive index measures 0.68.    The bladder is partially distended at the time of scanning and has an unremarkable appearance.    Left pleural effusion.                                       X-Ray Chest AP Portable (Final result)  Result time 03/03/25 00:11:10      Final result by Portillo Oquendo MD (03/03/25 00:11:10)                   Impression:      Cardiomegaly with pulmonary edema and bilateral pleural effusions.  Aeration is similar to mildly worse when  "compared with 02/19/2025.    New right-sided central venous catheter overlying the SVC.      Electronically signed by: Portillo Oquendo MD  Date:    03/03/2025  Time:    00:11               Narrative:    EXAMINATION:  XR CHEST AP PORTABLE    CLINICAL HISTORY:  Provided history is "  Shortness of breath".    TECHNIQUE:  One view of the chest.    COMPARISON:  02/19/2025.    FINDINGS:  Cardiac wires overlie the chest.  Cardiomediastinal silhouette is enlarged and similar to the prior study.  Right-sided central venous catheter overlies the SVC.  Cardiac pacing device is again present.  Atherosclerotic calcifications overlie the aortic arch.  Central vascular congestion with bilateral interstitial opacities, likely pulmonary edema.  Small to moderate bilateral pleural effusions.  Passive atelectasis or airspace disease in the lung bases.  Upper lungs are relatively clear.  No distinct pneumothorax.                                       US Abdomen Limited (Final result)  Result time 03/02/25 23:40:39      Final result by Portillo Oquendo MD (03/02/25 23:40:39)                   Impression:      1. Cholelithiasis and gallbladder sludge without evidence of cholecystitis.  Distended gallbladder.  2. Hepatomegaly.  3. Bilateral pleural effusion.    Electronically signed by resident: Reno Finnegan  Date:    03/02/2025  Time:    23:11    Electronically signed by: Portillo Oquendo MD  Date:    03/02/2025  Time:    23:40               Narrative:    EXAMINATION:  US ABDOMEN LIMITED    CLINICAL HISTORY:  Provided clinical history is "liver".  Additional history includes chest pain and shortness of breath.  Upper abdominal pain.    TECHNIQUE:  Limited ultrasound of the right upper quadrant of the abdomen including pancreas, liver, gallbladder, common bile duct, and spleen was performed.    COMPARISON:  Abdominal ultrasound 09/15/2021.    FINDINGS:  Liver: Mildly enlarged measuring 18.4 cm. Homogeneous echotexture. Unchanged " calcification in the left hepatic lobe measuring 0.7 x 3.1 cm, previously 0.6 x 2.7 cm.    Gallbladder: Several mobile calcified gallstones, the largest measuring 7 mm, and sludge.  No wall thickening, mural hyperemia, or pericholecystic fluid.  No sonographic Christian's sign.  Gallbladder is distended.    Biliary system: The common duct is not dilated for age, measuring 6 mm.  No intrahepatic ductal dilatation.    Spleen: Upper limit of normal in size with a homogeneous echotexture, measuring 12.2 x 5.0 cm.    Pancreas: The visualized portions of pancreas appear normal.    Miscellaneous: No ascites.  Bilateral pleural effusion.  Limited evaluation of the right kidney is negative for hydronephrosis.                                      Medications Given:  Medications   albuterol inhaler 2 puff (has no administration in time range)   aspirin chewable tablet 81 mg (81 mg Oral Given 3/3/25 0931)   atorvastatin tablet 80 mg (80 mg Oral Given 3/3/25 0159)   isosorbide mononitrate 24 hr tablet 60 mg (60 mg Oral Given 3/3/25 0931)   pantoprazole EC tablet 40 mg (40 mg Oral Given 3/3/25 0931)   ticagrelor tablet 60 mg (60 mg Oral Given 3/3/25 0946)   sodium chloride 0.9% flush 10 mL (has no administration in time range)   naloxone 0.4 mg/mL injection 0.02 mg (has no administration in time range)   glucose chewable tablet 16 g (has no administration in time range)   glucose chewable tablet 24 g (has no administration in time range)   dextrose 50% injection 12.5 g (has no administration in time range)   dextrose 50% injection 25 g (has no administration in time range)   glucagon (human recombinant) injection 1 mg (has no administration in time range)   heparin (porcine) injection 5,000 Units (5,000 Units Subcutaneous Given 3/3/25 0083)   acetaminophen tablet 650 mg (has no administration in time range)   ondansetron injection 4 mg (has no administration in time range)   insulin aspart U-100 pen 0-5 Units (has no administration  in time range)   melatonin tablet 6 mg (6 mg Oral Given 3/3/25 0159)   hydrOXYzine HCL tablet 25 mg (has no administration in time range)   FLUoxetine capsule 40 mg (40 mg Oral Given 3/3/25 0931)   cephALEXin capsule 500 mg (500 mg Oral Given 3/3/25 0509)   insulin glargine U-100 (Lantus) pen 5 Units (has no administration in time range)   QUEtiapine tablet 25 mg (has no administration in time range)   senna-docusate 8.6-50 mg per tablet 1 tablet (1 tablet Oral Given 3/3/25 0946)   polyethylene glycol packet 17 g (17 g Oral Given 3/3/25 0932)   metoprolol succinate (TOPROL-XL) 24 hr tablet 25 mg (25 mg Oral Given 3/3/25 0931)   lactulose 20 gram/30 mL solution Soln 20 g (has no administration in time range)   furosemide injection 80 mg (80 mg Intravenous Given 3/3/25 0713)   hydrALAZINE tablet 50 mg (50 mg Oral Given 3/3/25 0509)   oxyCODONE immediate release tablet 10 mg (10 mg Oral Given 3/3/25 1206)   acetaminophen tablet 650 mg (650 mg Oral Given 3/2/25 2340)   furosemide injection 100 mg (100 mg Intravenous Given 3/3/25 0127)       ED Course as of 03/03/25 1331   Mon Mar 03, 2025   0000 Patient is a 73-year-old female with past medical history diabetes, fibromyalgia, lymphoma status post chemo, hypertension, hyperlipidemia, previous CVA, previous MI, CAD status post PCIs, ESRD on HD, HFpEF, anemia that is presenting for shortness of breath, chest pain.  Patient recently started on dialysis, was discharged on 02/25/2025.  Patient has had 2-3 dialysis sessions since then as per patient.  Patient had dialysis yesterday.  However patient states that since being discharged the patient has had continued chest pains, shortness of breath.  Patient states that she goes to dialysis, then they take off fluid and patient has had relief of shortness of breath for only several hours and then by the evening patient becomes short of breath again, has continued chest tightness.  Patient also has rapid accumulation of bilateral  lower extremity edema to and after dialysis.  Patient states that she has asked for them to take off more fluid however they told her that they are taking off as much fluid as they can.  Patient then is symptomatic, with chest pains and shortness of breath until her next dialysis session.  Patient denies any fevers, chills, nausea, vomiting, diarrhea.    Physical exam: 74-year-old female, no distress, appropriately conversational.  Patient is breathing comfortably on room air, no hypoxia however patient placed on nasal cannula secondary to comfort.  Patient states that she feels better when she is on additional oxygen.  Benign cardiac, abdominal exam otherwise.  The patient has mild crackles on exam.  Patient has 1+ pitting edema bilateral lower extremity, moderate edema to abdomen.    Plan: Patient continues to appear fluid overloaded, CHF exacerbation.  The patient has new leukocytosis.  We will consult Hospital Medicine for further evaluation for fluid overload.  Consider patient we will require further more aggressive dialysis until patient is at a dry weight where patient is less symptomatic. [RT]   0000 Attestation of Dr. Schilling (Attending Physician):      I have reviewed the record and the patient care provided by the Resident provider and agree with the documented HPI, ROS, PE.  I also agree with the treatment plan and work up and I have performed an independent history / physical exam and MDM.   [RT]      ED Course User Index  [RT] Jean Carlos Schilling MD       Discussed with attending physician Dr. Schilling    MDM:    Lorena Contreras is a 74 y.o. female with above medical history who presents with chest tightness and persistent shortness of breath despite hemodialysis.   Patient is afebrile, hemodynamically stable, and in no acute distress on arrival. Exam significant for mild bilateral rales, 1+ BLE pitting edema     Differential diagnoses considered include, but not limited to: CHF exacerbation, ESRD,  cirrhosis, infectious etiology    CXR per my interpretation demonstrates bilateral pleural effusions and pulmonary edema    Patient discussed with hospital medicine who will admit for further management.  Patient appears fluid overloaded despite dialysis and would likely benefit from hospitalization.    Medical Decision Making  Amount and/or Complexity of Data Reviewed  Labs: ordered.  Radiology: ordered.    Risk  OTC drugs.  Prescription drug management.  Decision regarding hospitalization.           Diagnostic Impression:    1. Shortness of breath    2. Chest pain    3. Hypervolemia    4. Pleural effusion         ED Disposition Condition    Admit                Patient and/or family understands the plan and is in agreement, verbalized understanding, questions answered    Jett Fatima MD  Resident  Emergency Medicine                              Jett Fatima MD  Resident  03/03/25 0114         [1]   No current facility-administered medications on file prior to encounter.     Current Outpatient Medications on File Prior to Encounter   Medication Sig Dispense Refill    albuterol (PROVENTIL/VENTOLIN HFA) 90 mcg/actuation inhaler Inhale 2 puffs into the lungs every 6 (six) hours as needed for Wheezing or Shortness of Breath. 18 g 1    aluminum & magnesium hydroxide-simethicone (MYLANTA MAX STRENGTH) 400-400-40 mg/5 mL suspension Take 30 mLs by mouth every 6 (six) hours as needed for Indigestion.      aspirin 81 MG Chew Take 1 tablet (81 mg total) by mouth once daily. Hold to avoid bleed risk on triple therapy and then resume on oct 27 once eliquis stops on oct 26th      atorvastatin (LIPITOR) 80 MG tablet Take 1 tablet (80 mg total) by mouth every evening. 90 tablet 3    cephALEXin (KEFLEX) 500 MG capsule Take 1 capsule (500 mg total) by mouth every 8 (eight) hours. 9 capsule 0    DEXCOM G7  Misc Use 1  to track blood glucose, ICD10: E11.65 1 each 0    DEXCOM G7 SENSOR Samina Use  "1 sensor every 10 days to track blood glucose, ICD10: E11.65, okay with 90 day supply if possible 3 each 11    FLUoxetine 40 MG capsule Take 1 capsule (40 mg total) by mouth once daily. 90 capsule 3    hydrALAZINE (APRESOLINE) 100 MG tablet Take 1 tablet (100 mg total) by mouth every 8 (eight) hours. 135 tablet 3    insulin aspart U-100 (NOVOLOG) 100 unit/mL (3 mL) InPn pen Inject 0-10 Units into the skin before meals and at bedtime as needed (Hyperglycemia).      insulin glargine U-100, Lantus, 100 unit/mL (3 mL) SubQ InPn pen Inject 10 Units into the skin every evening. 15 mL 6    isosorbide mononitrate (IMDUR) 60 MG 24 hr tablet Take 1 tablet (60 mg total) by mouth once daily. 30 tablet 11    LORazepam (ATIVAN) 1 MG tablet TAKE 1 TABLET BY MOUTH ONCE DAILY AS NEEDED FOR ANXIETY 30 tablet 0    meclizine (ANTIVERT) 12.5 mg tablet Take 1 tablet (12.5 mg total) by mouth 2 (two) times daily as needed for Dizziness. 28 tablet 0    NOVOLOG FLEXPEN U-100 INSULIN 100 unit/mL (3 mL) InPn pen Inject 10 Units into the skin 3 (three) times daily with meals. 15 mL 8    ondansetron (ZOFRAN-ODT) 8 MG TbDL Dissolve 1 tablet (8 mg total) by mouth every 8 (eight) hours as needed (nausea). 20 tablet 2    oxyCODONE (ROXICODONE) 10 mg Tab immediate release tablet Take 1 tablet (10 mg total) by mouth every 12 (twelve) hours as needed for Pain. 8 tablet 0    pantoprazole (PROTONIX) 40 MG tablet Take 1 tablet (40 mg total) by mouth once daily. 90 tablet 3    pen needle, diabetic (BD ULTRA-FINE SAGAR PEN NEEDLE) 32 gauge x 5/32" Ndle One pen needle use with insulin pen 4 times a day.  ICD-10: E11.9 150 each 11    QUEtiapine (SEROQUEL) 50 MG tablet Take 1 tablet (50 mg total) by mouth nightly as needed (insomnia). 90 tablet 3    ticagrelor (BRILINTA) 60 mg tablet Take 1 tablet (60 mg total) by mouth 2 (two) times daily. 180 tablet 3    tobramycin-dexAMETHasone 0.3-0.1% (TOBRADEX) 0.3-0.1 % DrpS Place 1 drop into the right eye 4 (four) times " daily.      triamcinolone acetonide 0.1% (KENALOG) 0.1 % cream Apply topically 2 (two) times daily. 80 g 2        Jean Carlos Schilling MD  03/03/25 4910

## 2025-03-03 NOTE — CONSULTS
[facility] - Emergency Dept  Nephrology  Consult Note    Patient Name: Lorena Contreras  MRN: [id number]  Admission Date: 3/2/2025  Hospital Length of Stay: 0 days  Attending Provider: Kari Smith MD   Primary Care Physician: Jorge Paris MD  Principal Problem:Hypervolemia associated with renal insufficiency    Inpatient consult to Nephrology  Consult performed by: Juaquin Capone MD  Consult ordered by: Janice Tucker NP  Reason for consult: HD management        Subjective:     HPI: Lorena is a pleasant 75 yo woman w pmhx significant for DM2, fibromyalgia, lymphoma s/p chemo, HTN HLD CVA MI CAD PCI HFpEF anemia, KYA on CKD3b now dialysis dependent 2/22/25 after recent hospitalization at [facility] cardiogenic shock from complete heart block w heart cath on 2/19. Last HD session 3/1/25. She was admitted overnight for SOB and chest tightness since discharge and leg pain attributed to swelling. Cr stable overnight from 2.1 to 1.9. No IO recorded. Anuric since starting HD. CXR showed pulmonary edema and bilateral pleural effusions. She was given 100 mg IV lasix in ED.     Past Medical History:   Diagnosis Date    Age-related osteoporosis with current pathological fracture with routine healing 11/13/2024    Allergy     Altered mental status 06/19/2022    DYSARTHRIA, SPASTIC MOVEMENTS & DIFFICULTY SWALLOWING    Anemia     Anxiety     Arthritis     Cataract     both removed    Colon polyps     Coronary artery disease     Depression     Diabetes mellitus, type II     Disorder of kidney and ureter     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/2023    Fibromyalgia     Follicular lymphoma     GERD (gastroesophageal reflux disease)     HTN (hypertension)     Hyperlipidemia     MI (myocardial infarction) 03/2019    Personal history of colonic polyps     Restless leg syndrome     Stroke        Past Surgical History:   Procedure Laterality Date    A-V CARDIAC PACEMAKER INSERTION N/A 2/17/2025    Procedure:  INSERTION, CARDIAC PACEMAKER, DUAL CHAMBER;  Surgeon: Karl Rico MD;  Location: [facility] CATH LAB;  Service: Cardiology;  Laterality: N/A;    APPLICATION OF WOUND VACUUM-ASSISTED CLOSURE DEVICE Right 9/25/2024    Procedure: APPLICATION, WOUND VAC;  Surgeon: Neto Davalos MD;  [facility];  Service: Orthopedics;  Laterality: Right;    COLONOSCOPY  11/07/2012    Colon polyp found; repeat in 5 years    COLONOSCOPY N/A 11/4/2024    Procedure: COLONOSCOPY;  Surgeon: David Castaneda MD;  Location: [facility] ENDO (2ND FLR);  Service: Endoscopy;  Laterality: N/A;    DEBRIDEMENT OF LOCAL FLAP Right 9/25/2024    Procedure: DEBRIDEMENT, LOCAL FLAP;  Surgeon: Neto Davalos MD;  [facility];  Service: Orthopedics;  Laterality: Right;    ELBOW SURGERY Right 2015    dislocation repair     ESOPHAGOGASTRODUODENOSCOPY  11/07/2012    atrophic gastritis, H pylori testing negative    INCISION AND DRAINAGE FOOT Right 6/2/2021    Procedure: INCISION AND DRAINAGE, FOOT, bone biopsy;  Surgeon: Quiana Penn DPM;  Location: [facility];  Service: Podiatry;  Laterality: Right;    IRRIGATION AND DEBRIDEMENT OF LOWER EXTREMITY Right 9/25/2024    Procedure: IRRIGATION AND DEBRIDEMENT, LOWER EXTREMITY; slider table, supine, bone foam, cysto tubing, 6L NS/dakins/peroxide, culture swabs;  Surgeon: Neto Davalos MD;  [address]  Service: Orthopedics;  Laterality: Right;    KNEE SURGERY Bilateral 2015    scoped    LEFT HEART CATHETERIZATION Left 3/29/2019    Procedure: Left heart cath;  Surgeon: Bladimir Barbosa MD;  Location: [facility] CATH LAB;  Service: Cardiology;  Laterality: Left;    LEFT HEART CATHETERIZATION Left 11/18/2019    Procedure: Left heart cath;  Surgeon: Karl Rico MD;  Location: [facility] CATH LAB;  Service: Cardiology;  Laterality: Left;    LEFT HEART CATHETERIZATION Left 1/8/2020    Procedure: Left heart cath, right radial, noon start;  Surgeon: Christos DOUGLASS  MD Rah;  Location: [facility] CATH LAB;  Service: Cardiology;  Laterality: Left;  RN Pre Op 1-6-20.  To be admitted 1-7-20 sor Aspirin Disensitation    LEFT HEART CATHETERIZATION Left 2/19/2025    Procedure: Left heart cath;  Surgeon: Karl Rico MD;  Location: [facility] CATH LAB;  Service: Cardiology;  Laterality: Left;    OPEN REDUCTION AND INTERNAL FIXATION (ORIF) OF FRACTURE OF ACETABULUM Right 8/16/2024    Procedure: ORIF, FRACTURE, ACETABULUM;  Surgeon: Neto Davalos MD;  Location: [facility] OR [address] [place] [address];  Service: Orthopedics;  Laterality: Right;  anterior and lateral pelvic incisions    OPEN REDUCTION AND INTERNAL FIXATION (ORIF) OF PILON FRACTURE Right 8/14/2024    Procedure: ORIF, FRACTURE, PILON;  Surgeon: Neto Davalos MD;  Location: [facility] OR [address] [place] [address];  Service: Orthopedics;  Laterality: Right;    TONSILLECTOMY  1955    ULTRASOUND GUIDANCE  1/8/2020    Procedure: Ultrasound Guidance;  Surgeon: Christos Monreal MD;  Location: [facility] CATH LAB;  Service: Cardiology;;       Review of patient's allergies indicates:   Allergen Reactions    Novolin 70/30 (semi-synthetic) Nausea And Vomiting     Severe vomiting on Relion 70/30    Sulfa (sulfonamide antibiotics) Anaphylaxis    Talwin [pentazocine lactate] Anaphylaxis    Victoza [liraglutide] Nausea And Vomiting    Glipizide Nausea Only    Codeine     Influenza virus vaccines Hives    Iodine and iodide containing products Hives    Levetiracetam Itching    Lyrica [pregabalin] Hallucinations    Neurontin [gabapentin]      Possible associated myoclonic jerk    Rituxan [rituximab] Hives    Zoloft [sertraline] Nausea And Vomiting     Current Facility-Administered Medications   Medication Frequency    acetaminophen tablet 650 mg Q6H PRN    albuterol inhaler 2 puff Q6H PRN    aspirin chewable tablet 81 mg Daily    atorvastatin tablet 80 mg QHS    cephALEXin capsule 500 mg Q8H    dextrose 50% injection 12.5 g PRN    dextrose 50% injection 25 g PRN    FLUoxetine  "capsule 40 mg Daily    furosemide injection 80 mg Q12H    glucagon (human recombinant) injection 1 mg PRN    glucose chewable tablet 16 g PRN    glucose chewable tablet 24 g PRN    heparin (porcine) injection 5,000 Units Q8H    hydrALAZINE tablet 50 mg Q8H    hydrOXYzine HCL tablet 25 mg TID PRN    insulin aspart U-100 pen 0-5 Units QID (AC + HS) PRN    insulin glargine U-100 (Lantus) pen 5 Units QHS    isosorbide mononitrate 24 hr tablet 60 mg Daily    lactulose 20 gram/30 mL solution Soln 20 g Q6H PRN    melatonin tablet 6 mg Nightly PRN    metoprolol succinate (TOPROL-XL) 24 hr tablet 25 mg Daily    naloxone 0.4 mg/mL injection 0.02 mg PRN    ondansetron injection 4 mg Q8H PRN    oxyCODONE immediate release tablet 10 mg Q12H PRN    pantoprazole EC tablet 40 mg Daily    polyethylene glycol packet 17 g Daily    QUEtiapine tablet 25 mg Nightly PRN    senna-docusate 8.6-50 mg per tablet 1 tablet BID    sodium chloride 0.9% flush 10 mL Q12H PRN    ticagrelor tablet 60 mg BID     Current Outpatient Medications   Medication    albuterol (PROVENTIL/VENTOLIN HFA) 90 mcg/actuation inhaler    aluminum & magnesium hydroxide-simethicone (MYLANTA MAX STRENGTH) 400-400-40 mg/5 mL suspension    aspirin 81 MG Chew    atorvastatin (LIPITOR) 80 MG tablet    DEXCOM G7  Misc    DEXCOM G7 SENSOR Samina    FLUoxetine 40 MG capsule    hydrALAZINE (APRESOLINE) 100 MG tablet    insulin aspart U-100 (NOVOLOG) 100 unit/mL (3 mL) InPn pen    insulin glargine U-100, Lantus, 100 unit/mL (3 mL) SubQ InPn pen    isosorbide mononitrate (IMDUR) 60 MG 24 hr tablet    LORazepam (ATIVAN) 1 MG tablet    meclizine (ANTIVERT) 12.5 mg tablet    NOVOLOG FLEXPEN U-100 INSULIN 100 unit/mL (3 mL) InPn pen    ondansetron (ZOFRAN-ODT) 8 MG TbDL    oxyCODONE (ROXICODONE) 10 mg Tab immediate release tablet    pantoprazole (PROTONIX) 40 MG tablet    pen needle, diabetic (BD ULTRA-FINE SAGAR PEN NEEDLE) 32 gauge x 5/32" Ndle    QUEtiapine (SEROQUEL) 50 MG " tablet    ticagrelor (BRILINTA) 60 mg tablet    tobramycin-dexAMETHasone 0.3-0.1% (TOBRADEX) 0.3-0.1 % DrpS    triamcinolone acetonide 0.1% (KENALOG) 0.1 % cream     Family History       Problem Relation (Age of Onset)    Allergy (severe) Daughter    Cancer Mother, Father    Diabetes Sister    Heart disease Mother    Hypertension Sister    Lung cancer Brother    No Known Problems Daughter          Tobacco Use    Smoking status: Never    Smokeless tobacco: Never   Substance and Sexual Activity    Alcohol use: Not Currently    Drug use: Never    Sexual activity: Not Currently     Partners: Male     Review of Systems   Constitutional:  Positive for fatigue.   Respiratory:  Positive for chest tightness and shortness of breath.    Cardiovascular:  Positive for leg swelling.   Neurological:  Positive for weakness.     Objective:     Vital Signs (Most Recent):  Temp: 98 °F (36.7 °C) (03/03/25 1300)  Pulse: 92 (03/03/25 1300)  Resp: (!) 28 (03/03/25 1300)  BP: 132/70 (03/03/25 1300)  SpO2: 100 % (03/03/25 1300) Vital Signs (24h Range):  Temp:  [97.5 °F (36.4 °C)-98.2 °F (36.8 °C)] 98 °F (36.7 °C)  Pulse:  [] 92  Resp:  [15-30] 28  SpO2:  [97 %-100 %] 100 %  BP: (128-170)/(60-88) 132/70     Weight: 85.7 kg (189 lb) (03/03/25 0700)  Body mass index is 27.91 kg/m².  Body surface area is 2.04 meters squared.    No intake/output data recorded.     Physical Exam  Cardiovascular:      Rate and Rhythm: Regular rhythm.      Heart sounds: Normal heart sounds.   Pulmonary:      Breath sounds: Rales present.   Musculoskeletal:         General: Swelling present.   Neurological:      Mental Status: She is alert. Mental status is at baseline.   Psychiatric:         Mood and Affect: Mood normal.          Significant Labs:  CBC:   Recent Labs   Lab 03/03/25  0404   WBC 11.13   RBC 2.65*   HGB 7.5*   HCT 24.4*      MCV 92   MCH 28.3   MCHC 30.7*     CMP:   Recent Labs   Lab 03/03/25  0520   *   CALCIUM 8.2*   ALBUMIN 2.8*    PROT 6.3   *   K 3.2*   CO2 23      BUN 20   CREATININE 1.9*   ALKPHOS 129   ALT 12   AST 28   BILITOT 0.6     All labs within the past 24 hours have been reviewed.  Assessment/Plan:   KYA on CKD3b secondary to hemodynamic instability from complete heart block w heart cath on 2/19, shortly after started on HD  DM2 - longstanding, difficult to control  Hypoxic resp failure  Fibromyalgia  HTN  Complete heart block s/p PPM  Pulmonary HTN  Secondary hyperparathyroidism of renal origin  Anemia in CKD    HD info: [facility], EDW ? Kg, Dr Alex Castañeda. Last HD session PTA 3/1/25. Anuric. R tunnel catheter. MWF.     -No UOP recorded w lasix 100 mg IV. On 2 LPM NC O2. Plan for HD session tonight/tomorrow as able w staffing issues and high census. CXR reviewed, pulm edema and pleural effusions. BP intermittently elevated. Primary goal is for volume removal.   -EPO w HD for anemia  -check vit d level    Thank you for your consult. I will follow-up with patient. Please contact us if you have any additional questions.    Juaquin Capone MD  Nephrology  [facility] Dept

## 2025-03-03 NOTE — PROGRESS NOTES
Follow up call to schedule PCP visit, lab, mammo ,     Overdue PCP - appointment already scheduled on 03/05/2025.    Overdue Diabetes Labs - appointment already scheduled on 05/09/2025.    Overdue Foot Exam - appointment notes added.    Overdue Eye Exam ( BEATRIZ sent jonathan 2024 exam ), Mammogram, & Bone Mineral Density Left message for patient to call our office. Please schedule.    Non-Compliant Blood Pressure Reading - the patient has an upcoming appointment scheduled w/ primary care on 03/05/2025.

## 2025-03-03 NOTE — ACP (ADVANCE CARE PLANNING)
"   Advance Care Planning     Date: 03/03/2025    Living Will  During this visit, I engaged the patient  in the voluntary advance care planning process. The patient and I reviewed the role for advance directives and their purpose in directing future healthcare if the patient's unable to speak for him/herself. At this point in time, the patient does have full decision-making capacity. We discussed different extreme health states that she could experience, and reviewed what kind of medical care she would want in those situations. The patient communicated that she would initially want lifesaving measures including CPR, defibrillation, and intubation; however if she were comatose and had little chance of a meaningful recovery, she would not want prolonged life-sustaining treatments or to "live on machines." We discussed the importance of completing a living Jc. I spent a total of 16 minutes engaging the patient in this advance care planning discussion.           "

## 2025-03-03 NOTE — ASSESSMENT & PLAN NOTE
Pt reports issues with constipation since recent discharge and has been taking colace without success.  -Will start bowel regimen.

## 2025-03-03 NOTE — HOSPITAL COURSE
Nephrology was consulted for dialysis. She urinated 300 mL after furosemide. She had not urinated since started dialysis. She was put on IV furosemide 80 mg twice daily. She takes oxycodone 10 mg at home and requested something stronger. Oxycodone was increased to her home dose. On 3/5/2025, furosemide was changed to 80 mg by mouth, which was ineffective. It was changed to bumetanide 4 mg, which was also ineffective. Nephrology recommended Palliative Care consult for medication management of air hunger. Palliative Care recommended oxycodone extended release 10 mg twice daily and oxycodone immediate release 10 mg every 6 hours as needed. On 3/7/2025 she reported numbness and tingling in her lower extremities that is chronic and intermittent (she has a prior diagnosis of peripheral neuropathy on her chart since 2015). Vitamin B12 level was high. Further evaluation of this can continue outpatient since she has had the problem for at least 10 years. By 3/9/2025, her net fluid status since admission was negative 10.878 liters. She reported shortness of breath while breathing through her mouth so was put on Venturi mask. Repeat chest X-ray on 3/10/2025 showed no change. On 3/11/2025, she requested Physical and Occupational Therapy evaluations. She reported that she was able to walk prior to her pacemaker placement, which was only 13 days prior to this admission, and has been debilitated since. She was weaned off nasal cannula on 3/11/2025. Home health was set up with physical therapy, occupational therapy, hospital bed and Nighat lift.

## 2025-03-03 NOTE — ASSESSMENT & PLAN NOTE
Recently diagnosed with KYA due to cardiogenic shock secondary to complete heart block and initially improved. However developed NSTEMI and under went left heart cath 2/19 and was initiated on HD on 2/22 while admitted at [facility].     Baseline creatinine is  ~2.0 . Most recent creatinine and eGFR are listed below.  Recent Labs     03/02/25  2104   CREATININE 2.1*   EGFRNORACEVR 24.3*      Plan  - KYA is stable  - Avoid nephrotoxins and renally dose meds for GFR listed above  - Monitor urine output, serial BMP, and adjust therapy as needed  - Consult nephrology for HD.

## 2025-03-03 NOTE — ASSESSMENT & PLAN NOTE
KYA is likely due to  cardiorenal . Baseline creatinine is unknown. Most recent creatinine and eGFR are listed below.  Recent Labs     03/02/25  2104 03/03/25  0520   CREATININE 2.1* 1.9*   EGFRNORACEVR 24.3* 27.4*      Plan  - KYA is improving  - Avoid nephrotoxins and renally dose meds for GFR listed above  - Monitor urine output, serial BMP, and adjust therapy as needed  -

## 2025-03-03 NOTE — SUBJECTIVE & OBJECTIVE
Past Medical History:   Diagnosis Date    Age-related osteoporosis with current pathological fracture with routine healing 11/13/2024    Allergy     Altered mental status 06/19/2022    DYSARTHRIA, SPASTIC MOVEMENTS & DIFFICULTY SWALLOWING    Anemia     Anxiety     Arthritis     Cataract     both removed    Colon polyps     Coronary artery disease     Depression     Diabetes mellitus, type II     Disorder of kidney and ureter     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/2023    Fibromyalgia     Follicular lymphoma     GERD (gastroesophageal reflux disease)     HTN (hypertension)     Hyperlipidemia     MI (myocardial infarction) 03/2019    Personal history of colonic polyps     Restless leg syndrome     Stroke        Past Surgical History:   Procedure Laterality Date    A-V CARDIAC PACEMAKER INSERTION N/A 2/17/2025    Procedure: INSERTION, CARDIAC PACEMAKER, DUAL CHAMBER;  Surgeon: Karl Rico MD;  Location: [facility] CATH LAB;  Service: Cardiology;  Laterality: N/A;    APPLICATION OF WOUND VACUUM-ASSISTED CLOSURE DEVICE Right 9/25/2024    Procedure: APPLICATION, WOUND VAC;  Surgeon: Neto Davalos MD;  Location: [address];  Service: Orthopedics;  Laterality: Right;    COLONOSCOPY  11/07/2012    Colon polyp found; repeat in 5 years    COLONOSCOPY N/A 11/4/2024    Procedure: COLONOSCOPY;  Surgeon: David Castaneda MD;  Location: [address]);  Service: Endoscopy;  Laterality: N/A;    DEBRIDEMENT OF LOCAL FLAP Right 9/25/2024    Procedure: DEBRIDEMENT, LOCAL FLAP;  Surgeon: Neto Davalos MD;  Location: [address];  Service: Orthopedics;  Laterality: Right;    ELBOW SURGERY Right 2015    dislocation repair     ESOPHAGOGASTRODUODENOSCOPY  11/07/2012    atrophic gastritis, H pylori testing negative    INCISION AND DRAINAGE FOOT Right 6/2/2021    Procedure: INCISION AND DRAINAGE, FOOT, bone biopsy;  Surgeon: Quiana Penn DPM;  Location: [facility] OR;  Service: Podiatry;  Laterality: Right;     IRRIGATION AND DEBRIDEMENT OF LOWER EXTREMITY Right 9/25/2024    Procedure: IRRIGATION AND DEBRIDEMENT, LOWER EXTREMITY; slider table, supine, bone foam, cysto tubing, 6L NS/dakins/peroxide, culture swabs;  Surgeon: Neto Davalos MD;  Location: [facility] OR [address] [place] [address];  Service: Orthopedics;  Laterality: Right;    KNEE SURGERY Bilateral 2015    scoped    LEFT HEART CATHETERIZATION Left 3/29/2019    Procedure: Left heart cath;  Surgeon: Bladimir Barbosa MD;  Location: [facility] CATH LAB;  Service: Cardiology;  Laterality: Left;    LEFT HEART CATHETERIZATION Left 11/18/2019    Procedure: Left heart cath;  Surgeon: Karl Rico MD;  Location: [facility] CATH LAB;  Service: Cardiology;  Laterality: Left;    LEFT HEART CATHETERIZATION Left 1/8/2020    Procedure: Left heart cath, right radial, noon start;  Surgeon: Christos Monreal MD;  Location: [facility] CATH LAB;  Service: Cardiology;  Laterality: Left;  RN Pre Op 1-6-20.  To be admitted 1-7-20 sor Aspirin Disensitation    LEFT HEART CATHETERIZATION Left 2/19/2025    Procedure: Left heart cath;  Surgeon: Karl Rico MD;  Location: [facility] CATH LAB;  Service: Cardiology;  Laterality: Left;    OPEN REDUCTION AND INTERNAL FIXATION (ORIF) OF FRACTURE OF ACETABULUM Right 8/16/2024    Procedure: ORIF, FRACTURE, ACETABULUM;  Surgeon: Neto Davalos MD;  Location: [facility] OR [address] [place] [address];  Service: Orthopedics;  Laterality: Right;  anterior and lateral pelvic incisions    OPEN REDUCTION AND INTERNAL FIXATION (ORIF) OF PILON FRACTURE Right 8/14/2024    Procedure: ORIF, FRACTURE, PILON;  Surgeon: Neto Davalos MD;  Location: [facility] OR [address] [place] [address];  Service: Orthopedics;  Laterality: Right;    TONSILLECTOMY  1955    ULTRASOUND GUIDANCE  1/8/2020    Procedure: Ultrasound Guidance;  Surgeon: Christos Monreal MD;  Location: [facility] CATH LAB;  Service: Cardiology;;       Review of patient's allergies indicates:   Allergen Reactions    Novolin 70/30 (semi-synthetic) Nausea And  Vomiting     Severe vomiting on Relion 70/30    Sulfa (sulfonamide antibiotics) Anaphylaxis    Talwin [pentazocine lactate] Anaphylaxis    Victoza [liraglutide] Nausea And Vomiting    Glipizide Nausea Only    Codeine     Influenza virus vaccines Hives    Iodine and iodide containing products Hives    Levetiracetam Itching    Lyrica [pregabalin] Hallucinations    Neurontin [gabapentin]      Possible associated myoclonic jerk    Rituxan [rituximab] Hives    Zoloft [sertraline] Nausea And Vomiting     Current Facility-Administered Medications   Medication Frequency    acetaminophen tablet 650 mg Q6H PRN    albuterol inhaler 2 puff Q6H PRN    aspirin chewable tablet 81 mg Daily    atorvastatin tablet 80 mg QHS    cephALEXin capsule 500 mg Q8H    dextrose 50% injection 12.5 g PRN    dextrose 50% injection 25 g PRN    FLUoxetine capsule 40 mg Daily    furosemide injection 80 mg Q12H    glucagon (human recombinant) injection 1 mg PRN    glucose chewable tablet 16 g PRN    glucose chewable tablet 24 g PRN    heparin (porcine) injection 5,000 Units Q8H    hydrALAZINE tablet 50 mg Q8H    hydrOXYzine HCL tablet 25 mg TID PRN    insulin aspart U-100 pen 0-5 Units QID (AC + HS) PRN    insulin glargine U-100 (Lantus) pen 5 Units QHS    isosorbide mononitrate 24 hr tablet 60 mg Daily    lactulose 20 gram/30 mL solution Soln 20 g Q6H PRN    melatonin tablet 6 mg Nightly PRN    metoprolol succinate (TOPROL-XL) 24 hr tablet 25 mg Daily    naloxone 0.4 mg/mL injection 0.02 mg PRN    ondansetron injection 4 mg Q8H PRN    oxyCODONE immediate release tablet 10 mg Q12H PRN    pantoprazole EC tablet 40 mg Daily    polyethylene glycol packet 17 g Daily    QUEtiapine tablet 25 mg Nightly PRN    senna-docusate 8.6-50 mg per tablet 1 tablet BID    sodium chloride 0.9% flush 10 mL Q12H PRN    ticagrelor tablet 60 mg BID     Current Outpatient Medications   Medication    albuterol (PROVENTIL/VENTOLIN HFA) 90 mcg/actuation inhaler    aluminum &  "magnesium hydroxide-simethicone (MYLANTA MAX STRENGTH) 400-400-40 mg/5 mL suspension    aspirin 81 MG Chew    atorvastatin (LIPITOR) 80 MG tablet    DEXCOM G7  Misc    DEXCOM G7 SENSOR Samina    FLUoxetine 40 MG capsule    hydrALAZINE (APRESOLINE) 100 MG tablet    insulin aspart U-100 (NOVOLOG) 100 unit/mL (3 mL) InPn pen    insulin glargine U-100, Lantus, 100 unit/mL (3 mL) SubQ InPn pen    isosorbide mononitrate (IMDUR) 60 MG 24 hr tablet    LORazepam (ATIVAN) 1 MG tablet    meclizine (ANTIVERT) 12.5 mg tablet    NOVOLOG FLEXPEN U-100 INSULIN 100 unit/mL (3 mL) InPn pen    ondansetron (ZOFRAN-ODT) 8 MG TbDL    oxyCODONE (ROXICODONE) 10 mg Tab immediate release tablet    pantoprazole (PROTONIX) 40 MG tablet    pen needle, diabetic (BD ULTRA-FINE SAGAR PEN NEEDLE) 32 gauge x 5/32" Ndle    QUEtiapine (SEROQUEL) 50 MG tablet    ticagrelor (BRILINTA) 60 mg tablet    tobramycin-dexAMETHasone 0.3-0.1% (TOBRADEX) 0.3-0.1 % DrpS    triamcinolone acetonide 0.1% (KENALOG) 0.1 % cream     Family History       Problem Relation (Age of Onset)    Allergy (severe) Daughter    Cancer Mother, Father    Diabetes Sister    Heart disease Mother    Hypertension Sister    Lung cancer Brother    No Known Problems Daughter          Tobacco Use    Smoking status: Never    Smokeless tobacco: Never   Substance and Sexual Activity    Alcohol use: Not Currently    Drug use: Never    Sexual activity: Not Currently     Partners: Male     Review of Systems   Constitutional:  Positive for fatigue.   Respiratory:  Positive for chest tightness and shortness of breath.    Cardiovascular:  Positive for leg swelling.   Neurological:  Positive for weakness.     Objective:     Vital Signs (Most Recent):  Temp: 98 °F (36.7 °C) (03/03/25 1300)  Pulse: 92 (03/03/25 1300)  Resp: (!) 28 (03/03/25 1300)  BP: 132/70 (03/03/25 1300)  SpO2: 100 % (03/03/25 1300) Vital Signs (24h Range):  Temp:  [97.5 °F (36.4 °C)-98.2 °F (36.8 °C)] 98 °F (36.7 °C)  Pulse:  " [] 92  Resp:  [15-30] 28  SpO2:  [97 %-100 %] 100 %  BP: (128-170)/(60-88) 132/70     Weight: 85.7 kg (189 lb) (03/03/25 0700)  Body mass index is 27.91 kg/m².  Body surface area is 2.04 meters squared.    No intake/output data recorded.     Physical Exam  Cardiovascular:      Rate and Rhythm: Regular rhythm.      Heart sounds: Normal heart sounds.   Pulmonary:      Breath sounds: Rales present.   Musculoskeletal:         General: Swelling present.   Neurological:      Mental Status: She is alert. Mental status is at baseline.   Psychiatric:         Mood and Affect: Mood normal.          Significant Labs:  CBC:   Recent Labs   Lab 03/03/25  0404   WBC 11.13   RBC 2.65*   HGB 7.5*   HCT 24.4*      MCV 92   MCH 28.3   MCHC 30.7*     CMP:   Recent Labs   Lab 03/03/25  0520   *   CALCIUM 8.2*   ALBUMIN 2.8*   PROT 6.3   *   K 3.2*   CO2 23      BUN 20   CREATININE 1.9*   ALKPHOS 129   ALT 12   AST 28   BILITOT 0.6     All labs within the past 24 hours have been reviewed.

## 2025-03-03 NOTE — ASSESSMENT & PLAN NOTE
Pulmonary hypertension -- echo 11/2019   Aortic stenosis   Patient is identified as having Diastolic (HFpEF) heart failure that is Acute on Chronic. CHF is currently uncontrolled due to volume overload due to: Continued edema of extremities, Rales/crackles on pulmonary exam, and Pulmonary edema/pleural effusion on CXR. Latest ECHO performed and demonstrates- Results for orders placed during the hospital encounter of 02/15/25    Echo Saline Bubble? No    Interpretation Summary    Left Ventricle: The left ventricle is normal in size. There is mild concentric hypertrophy. Septal motion is consistent with pacing. There is normal systolic function with a visually estimated ejection fraction of 60 - 65%. Grade II diastolic dysfunction.    Right Ventricle: Mild right ventricular enlargement. Systolic function is normal.    Left Atrium: Left atrium is moderately dilated.    Right Atrium: Right atrium is mildly dilated.    Aortic Valve: There is moderate stenosis. Aortic valve area by VTI is 1.0 cm². Aortic valve peak velocity is 3.2 m/s. Mean gradient is 24 mmHg. The dimensionless index is 0.33.    Mitral Valve: There is mild stenosis. The mean pressure gradient across the mitral valve is 12 mmHg at a heart rate of  bpm. There is mild regurgitation.    Tricuspid Valve: There is moderate regurgitation.    Pulmonary Artery: The estimated pulmonary artery systolic pressure is 72 mmHg.    IVC/SVC: Intermediate venous pressure at 8 mmHg.    Pericardium: There is a trivial posterior effusion.  . Continue Nitrate/Vasodilator and monitor clinical status closely. Monitor on telemetry. Patient is off CHF pathway.  Monitor strict Is&Os and daily weights.  Place on fluid restriction of 1.5 L. Continue to stress to patient importance of self efficacy and  on diet for CHF. Last BNP reviewed- and noted below   Recent Labs   Lab 03/02/25  2104   BNP 1,335*   -Received 100mg IV lasix x1, will monitor UO. Can continue IV lasix bid if  pt has UO.  -Will add back metoprolol 25mg qd.  -ECHO pending.

## 2025-03-03 NOTE — ASSESSMENT & PLAN NOTE
CHB (complete heart block)  -s/p ppm on 02/17, taking keflex tid (EOT 3/3)  -no acute issue  -cardiac monitoring

## 2025-03-03 NOTE — ED NOTES
"Pt reports SOB for the past few days. Pt went to dialysis yesterday and became extremely SOB. Pt states "I feel like I can't catch my breath". Pt reports chest pain. +nausea.   "

## 2025-03-04 ENCOUNTER — PATIENT OUTREACH (OUTPATIENT)
Facility: OTHER | Age: 75
End: 2025-03-04
Payer: MEDICARE

## 2025-03-04 PROBLEM — K21.9 GERD (GASTROESOPHAGEAL REFLUX DISEASE): Chronic | Status: ACTIVE | Noted: 2025-03-03

## 2025-03-04 LAB
ALBUMIN SERPL BCP-MCNC: 2.6 G/DL (ref 3.5–5.2)
ALP SERPL-CCNC: 119 U/L (ref 40–150)
ALT SERPL W/O P-5'-P-CCNC: 11 U/L (ref 10–44)
ANION GAP SERPL CALC-SCNC: 8 MMOL/L (ref 8–16)
AST SERPL-CCNC: 24 U/L (ref 10–40)
BASOPHILS # BLD AUTO: 0.05 K/UL (ref 0–0.2)
BASOPHILS NFR BLD: 0.5 % (ref 0–1.9)
BILIRUB SERPL-MCNC: 0.4 MG/DL (ref 0.1–1)
BUN SERPL-MCNC: 25 MG/DL (ref 8–23)
CALCIUM SERPL-MCNC: 8.1 MG/DL (ref 8.7–10.5)
CHLORIDE SERPL-SCNC: 102 MMOL/L (ref 95–110)
CO2 SERPL-SCNC: 22 MMOL/L (ref 23–29)
CREAT SERPL-MCNC: 2 MG/DL (ref 0.5–1.4)
DIFFERENTIAL METHOD BLD: ABNORMAL
EOSINOPHIL # BLD AUTO: 0.1 K/UL (ref 0–0.5)
EOSINOPHIL NFR BLD: 0.6 % (ref 0–8)
ERYTHROCYTE [DISTWIDTH] IN BLOOD BY AUTOMATED COUNT: 15.7 % (ref 11.5–14.5)
EST. GFR  (NO RACE VARIABLE): 25.7 ML/MIN/1.73 M^2
GLUCOSE SERPL-MCNC: 93 MG/DL (ref 70–110)
HCT VFR BLD AUTO: 24.7 % (ref 37–48.5)
HGB BLD-MCNC: 7.5 G/DL (ref 12–16)
IMM GRANULOCYTES # BLD AUTO: 0.04 K/UL (ref 0–0.04)
IMM GRANULOCYTES NFR BLD AUTO: 0.4 % (ref 0–0.5)
LYMPHOCYTES # BLD AUTO: 1.1 K/UL (ref 1–4.8)
LYMPHOCYTES NFR BLD: 10.4 % (ref 18–48)
MAGNESIUM SERPL-MCNC: 1.8 MG/DL (ref 1.6–2.6)
MCH RBC QN AUTO: 27.9 PG (ref 27–31)
MCHC RBC AUTO-ENTMCNC: 30.4 G/DL (ref 32–36)
MCV RBC AUTO: 92 FL (ref 82–98)
MONOCYTES # BLD AUTO: 0.9 K/UL (ref 0.3–1)
MONOCYTES NFR BLD: 8.1 % (ref 4–15)
NEUTROPHILS # BLD AUTO: 8.7 K/UL (ref 1.8–7.7)
NEUTROPHILS NFR BLD: 80 % (ref 38–73)
NRBC BLD-RTO: 0 /100 WBC
PHOSPHATE SERPL-MCNC: 3.1 MG/DL (ref 2.7–4.5)
PLATELET # BLD AUTO: 230 K/UL (ref 150–450)
PMV BLD AUTO: 11.9 FL (ref 9.2–12.9)
POCT GLUCOSE: 101 MG/DL (ref 70–110)
POCT GLUCOSE: 184 MG/DL (ref 70–110)
POTASSIUM SERPL-SCNC: 3.3 MMOL/L (ref 3.5–5.1)
PROT SERPL-MCNC: 6.1 G/DL (ref 6–8.4)
RBC # BLD AUTO: 2.69 M/UL (ref 4–5.4)
SODIUM SERPL-SCNC: 132 MMOL/L (ref 136–145)
WBC # BLD AUTO: 10.82 K/UL (ref 3.9–12.7)

## 2025-03-04 PROCEDURE — 80053 COMPREHEN METABOLIC PANEL: CPT | Mod: HCNC | Performed by: NURSE PRACTITIONER

## 2025-03-04 PROCEDURE — 85025 COMPLETE CBC W/AUTO DIFF WBC: CPT | Mod: HCNC | Performed by: NURSE PRACTITIONER

## 2025-03-04 PROCEDURE — 94761 N-INVAS EAR/PLS OXIMETRY MLT: CPT | Mod: HCNC

## 2025-03-04 PROCEDURE — 99900035 HC TECH TIME PER 15 MIN (STAT): Mod: HCNC

## 2025-03-04 PROCEDURE — 63600175 PHARM REV CODE 636 W HCPCS: Mod: HCNC | Performed by: NURSE PRACTITIONER

## 2025-03-04 PROCEDURE — 21400001 HC TELEMETRY ROOM: Mod: HCNC

## 2025-03-04 PROCEDURE — 36415 COLL VENOUS BLD VENIPUNCTURE: CPT | Mod: HCNC | Performed by: NURSE PRACTITIONER

## 2025-03-04 PROCEDURE — 90935 HEMODIALYSIS ONE EVALUATION: CPT | Mod: HCNC

## 2025-03-04 PROCEDURE — 84100 ASSAY OF PHOSPHORUS: CPT | Mod: HCNC | Performed by: NURSE PRACTITIONER

## 2025-03-04 PROCEDURE — 94640 AIRWAY INHALATION TREATMENT: CPT | Mod: HCNC

## 2025-03-04 PROCEDURE — 83735 ASSAY OF MAGNESIUM: CPT | Mod: HCNC | Performed by: NURSE PRACTITIONER

## 2025-03-04 PROCEDURE — 5A1D70Z PERFORMANCE OF URINARY FILTRATION, INTERMITTENT, LESS THAN 6 HOURS PER DAY: ICD-10-PCS | Performed by: STUDENT IN AN ORGANIZED HEALTH CARE EDUCATION/TRAINING PROGRAM

## 2025-03-04 PROCEDURE — 99232 SBSQ HOSP IP/OBS MODERATE 35: CPT | Mod: HCNC,,, | Performed by: INTERNAL MEDICINE

## 2025-03-04 PROCEDURE — 25000242 PHARM REV CODE 250 ALT 637 W/ HCPCS: Mod: HCNC | Performed by: NURSE PRACTITIONER

## 2025-03-04 PROCEDURE — 25000003 PHARM REV CODE 250: Mod: HCNC | Performed by: NURSE PRACTITIONER

## 2025-03-04 RX ORDER — HEPARIN SODIUM 1000 [USP'U]/ML
1000 INJECTION, SOLUTION INTRAVENOUS; SUBCUTANEOUS
Status: DISCONTINUED | OUTPATIENT
Start: 2025-03-04 | End: 2025-03-12 | Stop reason: HOSPADM

## 2025-03-04 RX ORDER — LORAZEPAM 1 MG/1
TABLET ORAL
Qty: 30 TABLET | Refills: 0 | Status: SHIPPED | OUTPATIENT
Start: 2025-03-04

## 2025-03-04 RX ADMIN — METOPROLOL SUCCINATE 25 MG: 25 TABLET, EXTENDED RELEASE ORAL at 09:03

## 2025-03-04 RX ADMIN — HEPARIN SODIUM 5000 UNITS: 5000 INJECTION INTRAVENOUS; SUBCUTANEOUS at 02:03

## 2025-03-04 RX ADMIN — FUROSEMIDE 80 MG: 10 INJECTION, SOLUTION INTRAVENOUS at 09:03

## 2025-03-04 RX ADMIN — ALBUTEROL SULFATE 2 PUFF: 108 INHALANT RESPIRATORY (INHALATION) at 03:03

## 2025-03-04 RX ADMIN — ACETAMINOPHEN 650 MG: 325 TABLET ORAL at 02:03

## 2025-03-04 RX ADMIN — ASPIRIN 81 MG CHEWABLE TABLET 81 MG: 81 TABLET CHEWABLE at 09:03

## 2025-03-04 RX ADMIN — HYDROXYZINE HYDROCHLORIDE 25 MG: 25 TABLET ORAL at 04:03

## 2025-03-04 RX ADMIN — HYDROXYZINE HYDROCHLORIDE 25 MG: 25 TABLET ORAL at 09:03

## 2025-03-04 RX ADMIN — FLUOXETINE HYDROCHLORIDE 40 MG: 20 CAPSULE ORAL at 09:03

## 2025-03-04 RX ADMIN — HEPARIN SODIUM 5000 UNITS: 5000 INJECTION INTRAVENOUS; SUBCUTANEOUS at 09:03

## 2025-03-04 RX ADMIN — ISOSORBIDE MONONITRATE 60 MG: 60 TABLET, EXTENDED RELEASE ORAL at 09:03

## 2025-03-04 RX ADMIN — HYDRALAZINE HYDROCHLORIDE 50 MG: 25 TABLET ORAL at 02:03

## 2025-03-04 RX ADMIN — ATORVASTATIN CALCIUM 80 MG: 40 TABLET, FILM COATED ORAL at 09:03

## 2025-03-04 RX ADMIN — Medication 6 MG: at 09:03

## 2025-03-04 RX ADMIN — QUETIAPINE FUMARATE 25 MG: 25 TABLET ORAL at 09:03

## 2025-03-04 RX ADMIN — INSULIN GLARGINE 5 UNITS: 100 INJECTION, SOLUTION SUBCUTANEOUS at 09:03

## 2025-03-04 RX ADMIN — PANTOPRAZOLE SODIUM 40 MG: 40 TABLET, DELAYED RELEASE ORAL at 09:03

## 2025-03-04 RX ADMIN — TICAGRELOR 60 MG: 60 TABLET ORAL at 09:03

## 2025-03-04 NOTE — PROGRESS NOTES
[facility] Medicine  Progress Note    Patient Name: Lorena Contreras  MRN: [id number]  Patient Class: IP- Inpatient   Admission Date: 3/2/2025  Length of Stay: 1 days  Attending Physician: Kari Smith MD  Primary Care Provider: Jorge Paris MD    Subjective     Principal Problem:Hypervolemia associated with renal insufficiency    HPI:  Lorena Contreras is a 74 y.o. female with a PMHx DM2, fibromyalgia, lymphoma s/p chemo, HTN, HLD, CVA, MI, CAD s/p PCIs, HFpEF, anemia, KYA on CKD3b newly on HD (2/22/25), and recent admission at [facility] (2/15- 2/25/25) for CHB, KYA, and NSTEMI who presents to the emergency department with a chief complaint of worsening shortness of breath and chest tightness since discharge. Reports mild improvement of symptoms after HD lasting several hours. Endorses associated OSMAN, significant swelling to BLE and abdomen, orthopnea, wheezing, fatigue, and generalized weakness. Patient reports the chest tightness is non-radiating, substernal and is worse with MSK manipulation and deep breathing, but unaffected by exertion or positioning. Also reports diffuse pain to legs that she attributes to the swelling. She notes generalized abdominal discomfort, described as a dull, nagging pain. Does report nausea and several episodes of vomiting. States since initiation of HD she has been anuric. No missed HD treatments, last HD session 3/1/25. She denies fever/chills, cough, congestion, headache, or lightheadedness/dizziness.     In the ED, patient with tachypnea and mild tachycardia otherwise vitals stable, afebrile. No documented hypoxia, but placed on 2L O2 via NC for comfort. CBC with stable anemia and WBC 16.29. PT/INR WNL. Na 135. Bicarb 20. Cr 2.1 (bl prior to HD initiation ~2.0). Glucose 194. Calcium 8.6. Albumin 3.1. BNP 1,335. HS troponin 64>>57. EKG shows SR w/ bigeminy, 70 bpm, no ST elevation or depression. CXR with cardiomegaly with pulmonary edema and  bilateral pleural effusions. Aeration is similar to mildly worse when compared with 02/19/2025. New right-sided central venous catheter overlying the SVC. Abd US with cholelithiasis and gallbladder sludge without evidence of cholecystitis. Distended gallbladder. Hepatomegaly. Bilateral pleural effusion. The patient received tylenol and 100mg IV lasix.    Overview/Hospital Course:  3/3- new HD pt with volume overload. Has PPM and Hx of complete HB. /72 VSS. GDMT being resumed.  Metoprolol resumed, hydralazine dose reduced. Receiving lasix. Plan for volume removal in HD. UO 300ml and previously hadn't urinated since being started on HD in the hospital. Diuresing lasix 80 mg IV bid.  Pain: Takes oxycod 10 at home and she is asking for something stronger- will increase oxycod dose 5-> 10 mg now.     3/4- HD scheduled this am. K 3.3  should correct w HD.  On lasix 80 iv. Hydralzine, imdur, metoprolol. May benefit from jardiance after volume removal.         Interval History: see above    Review of Systems   Constitutional:  Positive for activity change. Negative for appetite change and fever.   HENT:  Negative for trouble swallowing.    Respiratory:  Positive for shortness of breath. Negative for cough.    Cardiovascular:  Negative for chest pain and leg swelling.   Gastrointestinal:  Positive for abdominal distention and abdominal pain. Negative for constipation, diarrhea and nausea.   Genitourinary:  Negative for difficulty urinating.   Musculoskeletal:  Negative for back pain, gait problem and joint swelling.   Skin:         Bottom discomfort related to pressure in bed, daniel w mattress overlay in the ED   Neurological:  Negative for numbness.   Psychiatric/Behavioral:  Negative for behavioral problems and confusion.      Objective:     Vital Signs (Most Recent):  Temp: 98 °F (36.7 °C) (03/04/25 0540)  Pulse: 87 (03/04/25 0659)  Resp: 18 (03/04/25 0540)  BP: (!) 116/57 (03/04/25 0540)  SpO2: 99 % (03/04/25 0540)  Vital Signs (24h Range):  Temp:  [97.6 °F (36.4 °C)-98.8 °F (37.1 °C)] 98 °F (36.7 °C)  Pulse:  [] 87  Resp:  [17-30] 18  SpO2:  [98 %-100 %] 99 %  BP: (102-170)/(54-95) 116/57     Weight: 85.7 kg (189 lb)  Body mass index is 27.91 kg/m².  No intake or output data in the 24 hours ending 03/04/25 0832      Physical Exam  Constitutional:       General: She is not in acute distress.     Appearance: Normal appearance.   HENT:      Head: Normocephalic and atraumatic.      Nose: Nose normal.      Mouth/Throat:      Mouth: Mucous membranes are moist.   Eyes:      General: No scleral icterus.     Extraocular Movements: Extraocular movements intact.      Pupils: Pupils are equal, round, and reactive to light.   Cardiovascular:      Rate and Rhythm: Normal rate and regular rhythm.      Pulses: Normal pulses.      Heart sounds: Murmur heard.      No gallop.   Pulmonary:      Effort: Pulmonary effort is normal.      Breath sounds: Normal breath sounds. No wheezing, rhonchi or rales.      Comments: Decreased BS at bases bilateraly  Chest:      Chest wall: No tenderness.   Abdominal:      General: Abdomen is flat. Bowel sounds are normal. There is no distension.      Palpations: Abdomen is soft.      Tenderness: There is no abdominal tenderness. There is no right CVA tenderness, left CVA tenderness, guarding or rebound.   Musculoskeletal:         General: No swelling, tenderness or deformity. Normal range of motion.      Cervical back: Normal range of motion and neck supple. No rigidity or tenderness.      Right lower leg: Edema present.      Left lower leg: Edema present.   Skin:     General: Skin is warm and dry.      Coloration: Skin is not jaundiced or pale.      Findings: No erythema or rash.   Neurological:      General: No focal deficit present.      Mental Status: She is alert. Mental status is at baseline.      Cranial Nerves: No cranial nerve deficit.      Motor: No weakness.   Psychiatric:         Thought  Content: Thought content normal.         Judgment: Judgment normal.             Significant Labs: All pertinent labs within the past 24 hours have been reviewed.  CBC:   Recent Labs   Lab 03/02/25  2104 03/03/25  0404 03/04/25  0631   WBC 16.29* 11.13 10.82   HGB 8.5* 7.5* 7.5*   HCT 27.1* 24.4* 24.7*    251 230     CMP:   Recent Labs   Lab 03/02/25  2104 03/03/25  0520 03/04/25  0631   * 135* 132*   K 3.5 3.2* 3.3*    105 102   CO2 20* 23 22*   * 123* 93   BUN 19 20 25*   CREATININE 2.1* 1.9* 2.0*   CALCIUM 8.6* 8.2* 8.1*   PROT 7.0 6.3 6.1   ALBUMIN 3.1* 2.8* 2.6*   BILITOT 0.6 0.6 0.4   ALKPHOS 146 129 119   AST 34 28 24   ALT 13 12 11   ANIONGAP 13 7* 8       Significant Imaging: I have reviewed all pertinent imaging results/findings within the past 24 hours.    Assessment and Plan     * Hypervolemia associated with renal insufficiency  Acute kidney injury superimposed on stage 3b chronic kidney disease   Recently diagnosed with KYA due to cardiogenic shock secondary to complete heart block, initially improved. However developed NSTEMI and underwent LHC 2/19 and was initiated on HD on 2/22 while admitted at [facility]. Pt reports worsening SOB, diffuse body swelling, and orthopnea since discharge. States since initiation of HD she has been anuric. No missed HD treatments, last HD session 3/1/25. Of note her daily lasix was discontinued at discharge.    Pt reports Lasix was discontinued after dc last week.     Baseline creatinine is ~2.0. Most recent creatinine and eGFR are listed below.  Recent Labs     03/02/25 2104   CREATININE 2.1*   EGFRNORACEVR 24.3*      Plan  - KYA is stable  - Avoid nephrotoxins and renally dose meds for GFR listed above  - Monitor urine output, serial BMP, and adjust therapy as needed  - Consult nephrology for HD.   - Trial lasix while awaiting HD.  - UA, urine Na, and urine Protein/Cr ratio  - Renal US pending.  - Daily wt/ strict I&Os  - Renal diet/ 1.5L  FR    3/3- lasix 80 mg IV bid. HD per nephrology.  3/4- HD today for volume remval    Acute on chronic diastolic heart failure  Pulmonary hypertension -- echo 11/2019   Aortic stenosis  is severe, with mitral regurgitation, suspect cardiorenal syndrome due to acute CHF. Renal failure due to recent Cardiac Shock due to complete HB.    Has LE edema, pulmonary edema, pericardial and pleural effusions.     Patient is identified as having Diastolic (HFpEF) heart failure that is Acute on Chronic. CHF is currently uncontrolled due to volume overload due to: Continued edema of extremities, Rales/crackles on pulmonary exam, and Pulmonary edema/pleural effusion on CXR. Latest ECHO performed and demonstrates- Results for orders placed during the hospital encounter of 02/15/25    Echo Saline Bubble? No    Interpretation Summary    Left Ventricle: The left ventricle is normal in size. There is mild concentric hypertrophy. Septal motion is consistent with pacing. There is normal systolic function with a visually estimated ejection fraction of 60 - 65%. Grade II diastolic dysfunction.    Right Ventricle: Mild right ventricular enlargement. Systolic function is normal.    Left Atrium: Left atrium is moderately dilated.    Right Atrium: Right atrium is mildly dilated.    Aortic Valve: There is moderate stenosis. Aortic valve area by VTI is 1.0 cm². Aortic valve peak velocity is 3.2 m/s. Mean gradient is 24 mmHg. The dimensionless index is 0.33.    Mitral Valve: There is mild stenosis. The mean pressure gradient across the mitral valve is 12 mmHg at a heart rate of  bpm. There is mild regurgitation.    Tricuspid Valve: There is moderate regurgitation.    Pulmonary Artery: The estimated pulmonary artery systolic pressure is 72 mmHg.    IVC/SVC: Intermediate venous pressure at 8 mmHg.    Pericardium: There is a trivial posterior effusion.  . Continue Nitrate/Vasodilator and monitor clinical status closely. Monitor on telemetry. Patient  is off CHF pathway.  Monitor strict Is&Os and daily weights.  Place on fluid restriction of 1.5 L. Continue to stress to patient importance of self efficacy and  on diet for CHF. Last BNP reviewed- and noted below   Recent Labs   Lab 03/02/25 2104   BNP 1,335*     -Received 100mg IV lasix x1, will monitor UO. Can continue IV lasix bid if pt has UO.  -Will add back metoprolol 25mg qd.  -ECHO 3/3:    Left Ventricle: The left ventricle is normal in size. Ventricular mass is normal. Normal wall thickness. There is concentric remodeling. There is normal systolic function with a visually estimated ejection fraction of 55%. Quantitated ejection fraction is 50%. There is diastolic dysfunction. Elevated left ventricular filling pressure.    Left Ventricle: Regional wall motion abnormalities present.    Right Ventricle: The right ventricle is normal in size. Systolic function is borderline low.    Left Atrium: severely dilated    Aortic Valve: There is moderate aortic valve sclerosis. Severely restricted motion. There is severe stenosis. Aortic valve area by VTI is 0.7 cm2. Aortic valve peak velocity is 4.2 m/s. Mean gradient is 40 mmHg. The dimensionless index is 0.23.    Mitral Valve: There is mild anterior leaflet sclerosis. There is severe posterior mitral annular calcification present. There is mild to moderate stenosis. The mean pressure gradient across the mitral valve is 6 mmHg at a heart rate of 89 bpm. There is mild to moderate MITRAL regurgitation. MVA 1.9cm2 by planimetry    Tricuspid Valve: There is moderate regurgitation.    Pulmonary Artery: The estimated pulmonary artery systolic pressure is 73 mmHg.    IVC/SVC: Elevated venous pressure at 15 mmHg.    Pericardium: There is a moderate circumferential effusion. Bilateral pleural effusions.    <30% respiratory variation across mitral valve, no diastolic RV collapse, however effusion moderate and new, would continue to monitor closely.    3/4- Volume  removal per HD. On lasix 80 iv. Hydralzine, imdur, metoprolol. May benefit from jardiance after volume removal.     Acute kidney injury superimposed on stage 3b chronic kidney disease  KYA is likely due to  cardiorenal . Renal failure due to cardiogenic shock from complete heart block w heart cath on 2/19. Last HD session 3/1/25. She was admitted overnight for SOB and chest tightness since discharge and leg pain attributed to swelling. Cr stable overnight from 2.1 to 1.9. Anuric since starting HD.       Baseline creatinine is unknown. Most recent creatinine and eGFR are listed below.  Recent Labs     03/02/25 2104 03/03/25  0520 03/04/25  0631   CREATININE 2.1* 1.9* 2.0*   EGFRNORACEVR 24.3* 27.4* 25.7*        Plan  - KYA is improving  - Avoid nephrotoxins and renally dose meds for GFR listed above  - Monitor urine output, serial BMP, and adjust therapy as needed    3/4-  HD today, still anuric    Type 2 diabetes mellitus with stage 3b chronic kidney disease, with long-term current use of insulin  Patient's FSGs are controlled on current hypoglycemics.   Last A1c reviewed-   Lab Results   Component Value Date    HGBA1C 6.6 (H) 02/27/2025     Most recent fingerstick glucose reviewed-   Recent Labs   Lab 03/03/25 2108 03/04/25  0802   POCTGLUCOSE 141* 101     Current correctional scale  Low  Maintain anti-hyperglycemic dose as follows-   Antihyperglycemics (From admission, onward)      Start     Stop Route Frequency Ordered    03/03/25 2100  insulin glargine U-100 (Lantus) pen 5 Units         -- SubQ Nightly 03/03/25 0233    03/03/25 0227  insulin aspart U-100 pen 0-5 Units         -- SubQ Before meals & nightly PRN 03/03/25 0128        -Accuchecks AC/HS  -Diabetic/renal diet    CHB (complete heart block)  PPM, on metoprolol      Pulmonary hypertension -- echo 11/2019  ECHO 2/25-he estimated pulmonary artery systolic pressure is 72 mmHg.      Follicular lymphoma  S/p chemo in 2010. In remission.     Cardiac  pacemaker in situ  CHB (complete heart block)  -s/p ppm on 02/17, taking keflex tid (EOT 3/3)  -no acute issue  -cardiac monitoring    Coronary artery disease of native artery of native heart with stable angina pectoris  Patient with known CAD s/p stent placement and CABG, which is controlled Will continue  brilinta, ASA, and Statin and monitor for S/Sx of angina/ACS. Continue to monitor on telemetry.   02/19   -S/p heart catheterization on 02/19, [facility] with patent stents and moderate LAD/RCA disease - no culprit identified     Anxiety  -Chronic issue.  -PRN hydroxyzine.    Primary hypertension  Patient's blood pressure range in the last 24 hours was: BP  Min: 102/57  Max: 170/72.The patient's inpatient anti-hypertensive regimen is listed below:  Current Antihypertensives  isosorbide mononitrate 24 hr tablet 60 mg, Daily, Oral  metoprolol succinate (TOPROL-XL) 24 hr tablet 25 mg, Daily, Oral  furosemide injection 80 mg, Every 12 hours, Intravenous  hydrALAZINE tablet 50 mg, Every 8 hours, Oral    Plan  - BP is controlled, no changes needed to their regimen    3/4- /58   Pulse 98. HD planned today.     Peripheral vascular disease in diabetes mellitus -- CLEMENT 05/2019  -Chronic issue.  -Continue ASA and statin.    Mixed hyperlipidemia   Patient is chronically on statin.will continue for now. Monitor clinically. Last LDL was   Lab Results   Component Value Date    LDLCALC 105.6 01/14/2025        Aortic stenosis  Echocardiogram with evidence of aortic stenosis that is severe. The patient's most recent echocardiogram result is listed below. We will manage the valvular abnormality by GDMT.    Echo  Result Date: 3/3/2025    Left Ventricle: The left ventricle is normal in size. Ventricular mass   is normal. Normal wall thickness. There is concentric remodeling. There is   normal systolic function with a visually estimated ejection fraction of   55%. Quantitated ejection fraction is 50%. There is diastolic dysfunction.    Elevated left ventricular filling pressure.    Left Ventricle: Regional wall motion abnormalities present.    Right Ventricle: The right ventricle is normal in size. Systolic   function is borderline low.    Left Atrium: severely dilated    Aortic Valve: There is moderate aortic valve sclerosis. Severely   restricted motion. There is severe stenosis. Aortic valve area by VTI is   0.7 cm2. Aortic valve peak velocity is 4.2 m/s. Mean gradient is 40 mmHg.   The dimensionless index is 0.23.    Mitral Valve: There is mild anterior leaflet sclerosis. There is severe   posterior mitral annular calcification present. There is mild to moderate   stenosis. The mean pressure gradient across the mitral valve is 6 mmHg at   a heart rate of 89 bpm. There is mild to moderate regurgitation. MVA   1.9cm2 by planimetry    Tricuspid Valve: There is moderate regurgitation.    Pulmonary Artery: The estimated pulmonary artery systolic pressure is   73 mmHg.    IVC/SVC: Elevated venous pressure at 15 mmHg.    Pericardium: There is a moderate circumferential effusion. Bilateral   pleural effusions.    <30% respiratory variation across mitral valve, no diastolic RV   collapse, however effusion moderate and new, would continue to monitor   closely.        Echo Saline Bubble? No  Result Date: 2/19/2025    Left Ventricle: The left ventricle is normal in size. There is mild   concentric hypertrophy. Septal motion is consistent with pacing. There is   normal systolic function with a visually estimated ejection fraction of 60   - 65%. Grade II diastolic dysfunction.    Right Ventricle: Mild right ventricular enlargement. Systolic function   is normal.    Left Atrium: Left atrium is moderately dilated.    Right Atrium: Right atrium is mildly dilated.    Aortic Valve: There is moderate stenosis. Aortic valve area by VTI is   1.0 cm². Aortic valve peak velocity is 3.2 m/s. Mean gradient is 24 mmHg.   The dimensionless index is 0.33.    Mitral  Valve: There is mild stenosis. The mean pressure gradient across   the mitral valve is 12 mmHg at a heart rate of  bpm. There is mild   regurgitation.    Tricuspid Valve: There is moderate regurgitation.    Pulmonary Artery: The estimated pulmonary artery systolic pressure is   72 mmHg.    IVC/SVC: Intermediate venous pressure at 8 mmHg.    Pericardium: There is a trivial posterior effusion.        Echo  Result Date: 2/15/2025    Left Ventricle: The left ventricle is normal in size. There is moderate   concentric hypertrophy. There is hyperdynamic systolic function with a   visually estimated ejection fraction of 70 - 75%.    Right Ventricle: Normal right ventricular cavity size. Systolic   function is normal.    Left Atrium: Left atrium is moderately dilated.    Right Atrium: Right atrium is mildly dilated.    Aortic Valve: There is moderate stenosis. Aortic valve area by VTI is   0.8 cm². Aortic valve peak velocity is 3.4 m/s. Mean gradient is 27 mmHg.   The dimensionless index is 0.27.    Mitral Valve: There is mild stenosis. The mean pressure gradient across   the mitral valve is 5 mmHg at a heart rate of 42  bpm. There is mild   regurgitation.    Tricuspid Valve: There is moderate regurgitation.    Pulmonary Artery: The estimated pulmonary artery systolic pressure is   49 mmHg.    IVC/SVC: Intermediate venous pressure at 8 mmHg.        Echo  Result Date: 10/31/2024    Left Ventricle: The left ventricle is normal in size. Normal wall   thickness. Normal wall motion. There is normal systolic function with a   visually estimated ejection fraction of 60 - 65%. Grade II diastolic   dysfunction. Elevated left ventricular filling pressure.    Right Ventricle: Normal right ventricular cavity size. Wall thickness   is normal. Systolic function is normal.    Left Atrium: Left atrium is severely dilated.    Aortic Valve: There is moderate aortic valve sclerosis. Moderately   restricted motion. There is moderate stenosis.  Aortic valve area by VTI is   1.1 cm2. Aortic valve peak velocity is 3.1 m/s. Mean gradient is 20.1   mmHg. The dimensionless index is 0.37.    Mitral Valve: There is mild regurgitation.    Tricuspid Valve: There is mild regurgitation.    Pulmonary Artery: There is pulmonary hypertension. The estimated   pulmonary artery systolic pressure is 46 mmHg.    IVC/SVC: Normal venous pressure at 3 mmHg.           Anemia  Anemia is likely due to chronic disease due to Chronic Kidney Disease. Most recent hemoglobin and hematocrit are listed below.  Recent Labs     03/02/25  2104 03/03/25  0404 03/04/25  0631   HGB 8.5* 7.5* 7.5*   HCT 27.1* 24.4* 24.7*       Plan  - Monitor serial CBC: Daily  - Transfuse PRBC if patient becomes hemodynamically unstable, symptomatic or H/H drops below 7/21.  - Patient has not received any PRBC transfusions to date  - Patient's anemia is currently stable    Constipation  Pt reports issues with constipation since recent discharge and has been taking colace without success.  -Will start bowel regimen.    GERD (gastroesophageal reflux disease)  -Chronic, stable.  -Continue PPI.      VTE Risk Mitigation (From admission, onward)           Ordered     heparin (porcine) injection 5,000 Units  Every 8 hours         03/03/25 0128     IP VTE HIGH RISK PATIENT  Once         03/03/25 0128     Place sequential compression device  Until discontinued         03/03/25 0128                    Discharge Planning   MIKHAIL:      Code Status: Full Code   Medical Readiness for Discharge Date:              Kari Smith MD  Department of Hospital Medicine   [facility]

## 2025-03-04 NOTE — ASSESSMENT & PLAN NOTE
Acute kidney injury superimposed on stage 3b chronic kidney disease   Recently diagnosed with KYA due to cardiogenic shock secondary to complete heart block, initially improved. However developed NSTEMI and underwent LHC 2/19 and was initiated on HD on 2/22 while admitted at [facility]. Pt reports worsening SOB, diffuse body swelling, and orthopnea since discharge. States since initiation of HD she has been anuric. No missed HD treatments, last HD session 3/1/25. Of note her daily lasix was discontinued at discharge.    Pt reports Lasix was discontinued after dc last week.     Baseline creatinine is ~2.0. Most recent creatinine and eGFR are listed below.  Recent Labs     03/02/25  2104   CREATININE 2.1*   EGFRNORACEVR 24.3*      Plan  - KYA is stable  - Avoid nephrotoxins and renally dose meds for GFR listed above  - Monitor urine output, serial BMP, and adjust therapy as needed  - Consult nephrology for HD.   - Trial lasix while awaiting HD.  - UA, urine Na, and urine Protein/Cr ratio  - Renal US pending.  - Daily wt/ strict I&Os  - Renal diet/ 1.5L FR    3/3- lasix 80 mg IV bid. HD per nephrology.  3/4- HD today for volume remval

## 2025-03-04 NOTE — PLAN OF CARE
03/04/25 1525   Readmission   Was this a planned readmission? No   Why were you hospitalized in the last 30 days? Complete heart block   Why were you readmitted? New medical problem   When you left the hospital how did you feel? good   When you left the hospital where did you go? Home with Home Health   Did patient/caregiver refused recommended DC plan? No   Tell me about what happened between when you left the hospital and the day you returned. SOB   When did you start not feeling well? 1-2 days ago   Did you try to manage your symptoms your self? Yes   What did you do? rest   Did you call anyone? No   Why? unsure   Did you try to see or did see a doctor or nurse before you came? No   Why? unsure   Did you have  a follow-up appointment on discharge? Yes   Did you go? Yes

## 2025-03-04 NOTE — ASSESSMENT & PLAN NOTE
Patient's blood pressure range in the last 24 hours was: BP  Min: 102/57  Max: 170/72.The patient's inpatient anti-hypertensive regimen is listed below:  Current Antihypertensives  isosorbide mononitrate 24 hr tablet 60 mg, Daily, Oral  metoprolol succinate (TOPROL-XL) 24 hr tablet 25 mg, Daily, Oral  furosemide injection 80 mg, Every 12 hours, Intravenous  hydrALAZINE tablet 50 mg, Every 8 hours, Oral    Plan  - BP is controlled, no changes needed to their regimen    3/4- /58   Pulse 98. HD planned today.

## 2025-03-04 NOTE — PROGRESS NOTES
"   03/04/25 1300        Hemodialysis Catheter 02/21/25 1425 right internal jugular   Placement Date/Time: 02/21/25 1425   Present Prior to Hospital Arrival?: No  Hand Hygiene: Performed  Barrier Precautions: Performed  Skin Antisepsis: ChloraPrep  Hemodialysis Catheter Type: Tunneled catheter  Location: right internal jugular  Cathete...   Line Necessity Review CRRT/HD   Site Assessment No drainage;No swelling;No warmth  (striated brusiing noted around insertion site where dressing is; pt states "allergic to tape")   Date on Dressing 03/04/25   Dressing Due to be Changed 03/11/25   Venous Patency/Care heparin locked   Arterial Patency/Care heparin locked       "

## 2025-03-04 NOTE — ASSESSMENT & PLAN NOTE
Pulmonary hypertension -- echo 11/2019   Aortic stenosis  is severe, with mitral regurgitation, suspect cardiorenal syndrome due to acute CHF. Renal failure due to recent Cardiac Shock due to complete HB.    Has LE edema, pulmonary edema, pericardial and pleural effusions.     Patient is identified as having Diastolic (HFpEF) heart failure that is Acute on Chronic. CHF is currently uncontrolled due to volume overload due to: Continued edema of extremities, Rales/crackles on pulmonary exam, and Pulmonary edema/pleural effusion on CXR. Latest ECHO performed and demonstrates- Results for orders placed during the hospital encounter of 02/15/25    Echo Saline Bubble? No    Interpretation Summary    Left Ventricle: The left ventricle is normal in size. There is mild concentric hypertrophy. Septal motion is consistent with pacing. There is normal systolic function with a visually estimated ejection fraction of 60 - 65%. Grade II diastolic dysfunction.    Right Ventricle: Mild right ventricular enlargement. Systolic function is normal.    Left Atrium: Left atrium is moderately dilated.    Right Atrium: Right atrium is mildly dilated.    Aortic Valve: There is moderate stenosis. Aortic valve area by VTI is 1.0 cm². Aortic valve peak velocity is 3.2 m/s. Mean gradient is 24 mmHg. The dimensionless index is 0.33.    Mitral Valve: There is mild stenosis. The mean pressure gradient across the mitral valve is 12 mmHg at a heart rate of  bpm. There is mild regurgitation.    Tricuspid Valve: There is moderate regurgitation.    Pulmonary Artery: The estimated pulmonary artery systolic pressure is 72 mmHg.    IVC/SVC: Intermediate venous pressure at 8 mmHg.    Pericardium: There is a trivial posterior effusion.  . Continue Nitrate/Vasodilator and monitor clinical status closely. Monitor on telemetry. Patient is off CHF pathway.  Monitor strict Is&Os and daily weights.  Place on fluid restriction of 1.5 L. Continue to stress to  patient importance of self efficacy and  on diet for CHF. Last BNP reviewed- and noted below   Recent Labs   Lab 03/02/25  2104   BNP 1,335*     -Received 100mg IV lasix x1, will monitor UO. Can continue IV lasix bid if pt has UO.  -Will add back metoprolol 25mg qd.  -ECHO 3/3:    Left Ventricle: The left ventricle is normal in size. Ventricular mass is normal. Normal wall thickness. There is concentric remodeling. There is normal systolic function with a visually estimated ejection fraction of 55%. Quantitated ejection fraction is 50%. There is diastolic dysfunction. Elevated left ventricular filling pressure.    Left Ventricle: Regional wall motion abnormalities present.    Right Ventricle: The right ventricle is normal in size. Systolic function is borderline low.    Left Atrium: severely dilated    Aortic Valve: There is moderate aortic valve sclerosis. Severely restricted motion. There is severe stenosis. Aortic valve area by VTI is 0.7 cm2. Aortic valve peak velocity is 4.2 m/s. Mean gradient is 40 mmHg. The dimensionless index is 0.23.    Mitral Valve: There is mild anterior leaflet sclerosis. There is severe posterior mitral annular calcification present. There is mild to moderate stenosis. The mean pressure gradient across the mitral valve is 6 mmHg at a heart rate of 89 bpm. There is mild to moderate MITRAL regurgitation. MVA 1.9cm2 by planimetry    Tricuspid Valve: There is moderate regurgitation.    Pulmonary Artery: The estimated pulmonary artery systolic pressure is 73 mmHg.    IVC/SVC: Elevated venous pressure at 15 mmHg.    Pericardium: There is a moderate circumferential effusion. Bilateral pleural effusions.    <30% respiratory variation across mitral valve, no diastolic RV collapse, however effusion moderate and new, would continue to monitor closely.    3/4- Volume removal per HD. On lasix 80 iv. Hydralzine, imdur, metoprolol. May benefit from jardiance after volume removal.

## 2025-03-04 NOTE — ASSESSMENT & PLAN NOTE
Patient's FSGs are controlled on current hypoglycemics.   Last A1c reviewed-   Lab Results   Component Value Date    HGBA1C 6.6 (H) 02/27/2025     Most recent fingerstick glucose reviewed-   Recent Labs   Lab 03/03/25  2108 03/04/25  0802   POCTGLUCOSE 141* 101     Current correctional scale  Low  Maintain anti-hyperglycemic dose as follows-   Antihyperglycemics (From admission, onward)      Start     Stop Route Frequency Ordered    03/03/25 2100  insulin glargine U-100 (Lantus) pen 5 Units         -- SubQ Nightly 03/03/25 0233    03/03/25 0227  insulin aspart U-100 pen 0-5 Units         -- SubQ Before meals & nightly PRN 03/03/25 0128        -Accuchecks AC/HS  -Diabetic/renal diet

## 2025-03-04 NOTE — PROGRESS NOTES
03/04/25 1315   Post-Hemodialysis Assessment   Blood Volume Processed (Liters) 70.6 L   Duration of Treatment 180 minutes   Net Fluid Removal 2119     HD tx completed without issue, blood returned and PC locked. Pt in NAD waiting on transport back to unit. Report called to primary RN.    used

## 2025-03-04 NOTE — PROGRESS NOTES
"Nephrology Daily Progress Note    Assessment and plan  KYA on CKD  -currently RRT dependent   -iHD today with 2 L UF      Subjective  Seen on HD, tolerating    Objective  PHYSICAL EXAMINATION:  Blood pressure 130/62, pulse 88, temperature 98.5 °F (36.9 °C), resp. rate 20, height 5' 9" (1.753 m), weight 85.7 kg (189 lb), SpO2 99%, not currently breastfeeding.  Constitutional - No acute distress  Cardiovascular - JVP normal  Respiratory - Coarse  Musculoskeletal - Peripheral edema 1+  "

## 2025-03-04 NOTE — SUBJECTIVE & OBJECTIVE
Interval History: see above    Review of Systems   Constitutional:  Positive for activity change. Negative for appetite change and fever.   HENT:  Negative for trouble swallowing.    Respiratory:  Positive for shortness of breath. Negative for cough.    Cardiovascular:  Negative for chest pain and leg swelling.   Gastrointestinal:  Positive for abdominal distention and abdominal pain. Negative for constipation, diarrhea and nausea.   Genitourinary:  Negative for difficulty urinating.   Musculoskeletal:  Negative for back pain, gait problem and joint swelling.   Skin:         Bottom discomfort related to pressure in bed, daniel w mattress overlay in the ED   Neurological:  Negative for numbness.   Psychiatric/Behavioral:  Negative for behavioral problems and confusion.      Objective:     Vital Signs (Most Recent):  Temp: 98 °F (36.7 °C) (03/04/25 0540)  Pulse: 87 (03/04/25 0659)  Resp: 18 (03/04/25 0540)  BP: (!) 116/57 (03/04/25 0540)  SpO2: 99 % (03/04/25 0540) Vital Signs (24h Range):  Temp:  [97.6 °F (36.4 °C)-98.8 °F (37.1 °C)] 98 °F (36.7 °C)  Pulse:  [] 87  Resp:  [17-30] 18  SpO2:  [98 %-100 %] 99 %  BP: (102-170)/(54-95) 116/57     Weight: 85.7 kg (189 lb)  Body mass index is 27.91 kg/m².  No intake or output data in the 24 hours ending 03/04/25 0832      Physical Exam  Constitutional:       General: She is not in acute distress.     Appearance: Normal appearance.   HENT:      Head: Normocephalic and atraumatic.      Nose: Nose normal.      Mouth/Throat:      Mouth: Mucous membranes are moist.   Eyes:      General: No scleral icterus.     Extraocular Movements: Extraocular movements intact.      Pupils: Pupils are equal, round, and reactive to light.   Cardiovascular:      Rate and Rhythm: Normal rate and regular rhythm.      Pulses: Normal pulses.      Heart sounds: Murmur heard.      No gallop.   Pulmonary:      Effort: Pulmonary effort is normal.      Breath sounds: Normal breath sounds. No  wheezing, rhonchi or rales.      Comments: Decreased BS at bases bilateraly  Chest:      Chest wall: No tenderness.   Abdominal:      General: Abdomen is flat. Bowel sounds are normal. There is no distension.      Palpations: Abdomen is soft.      Tenderness: There is no abdominal tenderness. There is no right CVA tenderness, left CVA tenderness, guarding or rebound.   Musculoskeletal:         General: No swelling, tenderness or deformity. Normal range of motion.      Cervical back: Normal range of motion and neck supple. No rigidity or tenderness.      Right lower leg: Edema present.      Left lower leg: Edema present.   Skin:     General: Skin is warm and dry.      Coloration: Skin is not jaundiced or pale.      Findings: No erythema or rash.   Neurological:      General: No focal deficit present.      Mental Status: She is alert. Mental status is at baseline.      Cranial Nerves: No cranial nerve deficit.      Motor: No weakness.   Psychiatric:         Thought Content: Thought content normal.         Judgment: Judgment normal.             Significant Labs: All pertinent labs within the past 24 hours have been reviewed.  CBC:   Recent Labs   Lab 03/02/25  2104 03/03/25  0404 03/04/25  0631   WBC 16.29* 11.13 10.82   HGB 8.5* 7.5* 7.5*   HCT 27.1* 24.4* 24.7*    251 230     CMP:   Recent Labs   Lab 03/02/25  2104 03/03/25  0520 03/04/25  0631   * 135* 132*   K 3.5 3.2* 3.3*    105 102   CO2 20* 23 22*   * 123* 93   BUN 19 20 25*   CREATININE 2.1* 1.9* 2.0*   CALCIUM 8.6* 8.2* 8.1*   PROT 7.0 6.3 6.1   ALBUMIN 3.1* 2.8* 2.6*   BILITOT 0.6 0.6 0.4   ALKPHOS 146 129 119   AST 34 28 24   ALT 13 12 11   ANIONGAP 13 7* 8       Significant Imaging: I have reviewed all pertinent imaging results/findings within the past 24 hours.

## 2025-03-04 NOTE — ED NOTES
Telemetry Verification   Patient placed on Telemetry Box  Verified with War Room  Box # [id number]   Monitor Tech War room   Rate 88   Rhythm sinus

## 2025-03-04 NOTE — ASSESSMENT & PLAN NOTE
KYA is likely due to  cardiorenal . Renal failure due to cardiogenic shock from complete heart block w heart cath on 2/19. Last HD session 3/1/25. She was admitted overnight for SOB and chest tightness since discharge and leg pain attributed to swelling. Cr stable overnight from 2.1 to 1.9. Anuric since starting HD.       Baseline creatinine is unknown. Most recent creatinine and eGFR are listed below.  Recent Labs     03/02/25  2104 03/03/25  0520 03/04/25  0631   CREATININE 2.1* 1.9* 2.0*   EGFRNORACEVR 24.3* 27.4* 25.7*        Plan  - KYA is improving  - Avoid nephrotoxins and renally dose meds for GFR listed above  - Monitor urine output, serial BMP, and adjust therapy as needed    3/4-  HD today, still anuric

## 2025-03-04 NOTE — ASSESSMENT & PLAN NOTE
Echocardiogram with evidence of aortic stenosis that is severe. The patient's most recent echocardiogram result is listed below. We will manage the valvular abnormality by GDMT.    Echo  Result Date: 3/3/2025    Left Ventricle: The left ventricle is normal in size. Ventricular mass   is normal. Normal wall thickness. There is concentric remodeling. There is   normal systolic function with a visually estimated ejection fraction of   55%. Quantitated ejection fraction is 50%. There is diastolic dysfunction.   Elevated left ventricular filling pressure.    Left Ventricle: Regional wall motion abnormalities present.    Right Ventricle: The right ventricle is normal in size. Systolic   function is borderline low.    Left Atrium: severely dilated    Aortic Valve: There is moderate aortic valve sclerosis. Severely   restricted motion. There is severe stenosis. Aortic valve area by VTI is   0.7 cm2. Aortic valve peak velocity is 4.2 m/s. Mean gradient is 40 mmHg.   The dimensionless index is 0.23.    Mitral Valve: There is mild anterior leaflet sclerosis. There is severe   posterior mitral annular calcification present. There is mild to moderate   stenosis. The mean pressure gradient across the mitral valve is 6 mmHg at   a heart rate of 89 bpm. There is mild to moderate regurgitation. MVA   1.9cm2 by planimetry    Tricuspid Valve: There is moderate regurgitation.    Pulmonary Artery: The estimated pulmonary artery systolic pressure is   73 mmHg.    IVC/SVC: Elevated venous pressure at 15 mmHg.    Pericardium: There is a moderate circumferential effusion. Bilateral   pleural effusions.    <30% respiratory variation across mitral valve, no diastolic RV   collapse, however effusion moderate and new, would continue to monitor   closely.        Echo Saline Bubble? No  Result Date: 2/19/2025    Left Ventricle: The left ventricle is normal in size. There is mild   concentric hypertrophy. Septal motion is consistent with pacing.  There is   normal systolic function with a visually estimated ejection fraction of 60   - 65%. Grade II diastolic dysfunction.    Right Ventricle: Mild right ventricular enlargement. Systolic function   is normal.    Left Atrium: Left atrium is moderately dilated.    Right Atrium: Right atrium is mildly dilated.    Aortic Valve: There is moderate stenosis. Aortic valve area by VTI is   1.0 cm². Aortic valve peak velocity is 3.2 m/s. Mean gradient is 24 mmHg.   The dimensionless index is 0.33.    Mitral Valve: There is mild stenosis. The mean pressure gradient across   the mitral valve is 12 mmHg at a heart rate of  bpm. There is mild   regurgitation.    Tricuspid Valve: There is moderate regurgitation.    Pulmonary Artery: The estimated pulmonary artery systolic pressure is   72 mmHg.    IVC/SVC: Intermediate venous pressure at 8 mmHg.    Pericardium: There is a trivial posterior effusion.        Echo  Result Date: 2/15/2025    Left Ventricle: The left ventricle is normal in size. There is moderate   concentric hypertrophy. There is hyperdynamic systolic function with a   visually estimated ejection fraction of 70 - 75%.    Right Ventricle: Normal right ventricular cavity size. Systolic   function is normal.    Left Atrium: Left atrium is moderately dilated.    Right Atrium: Right atrium is mildly dilated.    Aortic Valve: There is moderate stenosis. Aortic valve area by VTI is   0.8 cm². Aortic valve peak velocity is 3.4 m/s. Mean gradient is 27 mmHg.   The dimensionless index is 0.27.    Mitral Valve: There is mild stenosis. The mean pressure gradient across   the mitral valve is 5 mmHg at a heart rate of 42  bpm. There is mild   regurgitation.    Tricuspid Valve: There is moderate regurgitation.    Pulmonary Artery: The estimated pulmonary artery systolic pressure is   49 mmHg.    IVC/SVC: Intermediate venous pressure at 8 mmHg.        Echo  Result Date: 10/31/2024    Left Ventricle: The left ventricle is normal  in size. Normal wall   thickness. Normal wall motion. There is normal systolic function with a   visually estimated ejection fraction of 60 - 65%. Grade II diastolic   dysfunction. Elevated left ventricular filling pressure.    Right Ventricle: Normal right ventricular cavity size. Wall thickness   is normal. Systolic function is normal.    Left Atrium: Left atrium is severely dilated.    Aortic Valve: There is moderate aortic valve sclerosis. Moderately   restricted motion. There is moderate stenosis. Aortic valve area by VTI is   1.1 cm2. Aortic valve peak velocity is 3.1 m/s. Mean gradient is 20.1   mmHg. The dimensionless index is 0.37.    Mitral Valve: There is mild regurgitation.    Tricuspid Valve: There is mild regurgitation.    Pulmonary Artery: There is pulmonary hypertension. The estimated   pulmonary artery systolic pressure is 46 mmHg.    IVC/SVC: Normal venous pressure at 3 mmHg.

## 2025-03-04 NOTE — PLAN OF CARE
Readmission    CM to bedside - pt provided assessment info. Pt w/ DME in place, lives w/ grandson and his girlfriend. Pt receives assistance from her 2 dgtrs and her sister. Pt's transportation is provided by her sister. Pt was setup w/ [facility] at her recent discharge from [facility]. Pt is established w/ [facility] [place] [facility] @ 9:45am. Pt lives in a 1 story home w/ 5 steps to enter and she does have a ramp. Pt is inquiring about home o2.    KAITLYNN RonquilloN, RN, [facility]  Transitional Care Manager  [phone]  [email or web address]      [address] - Med Surg  Initial Discharge Assessment       Primary Care Provider: Jorge Paris MD    Admission Diagnosis: Shortness of breath [R06.02]  Hypervolemia [E87.70]  Pleural effusion [J90]  ESRD (end stage renal disease) [N18.6]  Chest pain [R07.9]    Admission Date: 3/2/2025  Expected Discharge Date: 3/5/2025    Transition of Care Barriers: None    Payor: [employer] MANAGED MEDICARE / Plan: [employer] HMO PPO SPECIAL NEEDS / Product Type: Medicare Advantage /     Extended Emergency Contact Information  Primary Emergency Contact: RADHA MUÑIZ  Mobile Phone: [phone]  Relation: Daughter  Preferred language: English   needed? No  Secondary Emergency Contact: JAY JAY SMITH  Mobile Phone: [phone]  Relation: Daughter  Preferred language: English   needed? No    Discharge Plan A: Home with family, Home Health  Discharge Plan B: Home with family      [facility] [place] - [address]  [address]  [place] [address]  Phone: [phone] Fax: [phone]    [facility]  [address]  [address]   [place] [address]  Phone: [phone] Fax: [phone]      Initial Assessment (most recent)       Adult Discharge Assessment - 03/04/25 1526          Discharge Assessment    Assessment Type Discharge Planning Assessment     Confirmed/corrected address, phone number and insurance Yes      Confirmed Demographics Correct on Facesheet     Source of Information patient;health record     When was your last doctors appointment? 02/28/25     Communicated MIKHAIL with patient/caregiver Yes     Reason For Admission SOB     People in Home grandchild(daniel)     Do you expect to return to your current living situation? Yes     Do you have help at home or someone to help you manage your care at home? Yes     Who are your caregiver(s) and their phone number(s)? lives w/ grandson and his girlfriend; loretta Drake [phone]     Prior to hospitilization cognitive status: Alert/Oriented     Current cognitive status: Alert/Oriented     Walking or Climbing Stairs Difficulty yes     Walking or Climbing Stairs ambulation difficulty, requires equipment     Mobility Management walker     Dressing/Bathing Difficulty yes     Dressing/Bathing bathing difficulty, requires equipment     Dressing/Bathing Management TTB     Home Layout Able to live on 1st floor     Equipment Currently Used at Home bath bench;bedside commode;wheelchair;rollator;walker, rolling;glucometer;other (see comments)   Dexcom    Readmission within 30 days? Yes     Patient currently being followed by outpatient case management? Unable to determine (comments)     Do you currently have service(s) that help you manage your care at home? Yes     Name and Contact number of agency [facility]     Is the pt/caregiver preference to resume services with current agency Yes     Do you take prescription medications? Yes     Who is going to help you get home at discharge? loretta Drake     How do you get to doctors appointments? family or friend will provide     Are you on dialysis? Yes     Dialysis Name and Scheduled days [facility] @ 9:45am     Do you take coumadin? No     Discharge Plan A Home with family;Home Health     Discharge Plan B Home with family     DME Needed Upon Discharge  oxygen     Discharge Plan discussed with: Patient     Transition of Care Barriers None         Physical Activity    On average, how many days per week do you engage in moderate to strenuous exercise (like a brisk walk)? 0 days     On average, how many minutes do you engage in exercise at this level? 0 min        Financial Resource Strain    How hard is it for you to pay for the very basics like food, housing, medical care, and heating? Not hard at all        Housing Stability    In the last 12 months, was there a time when you were not able to pay the mortgage or rent on time? No     At any time in the past 12 months, were you homeless or living in a shelter (including now)? No        Transportation Needs    Has the lack of transportation kept you from medical appointments, meetings, work or from getting things needed for daily living? No        Food Insecurity    Within the past 12 months, you worried that your food would run out before you got the money to buy more. Never true        Stress    Do you feel stress - tense, restless, nervous, or anxious, or unable to sleep at night because your mind is troubled all the time - these days? To some extent        Social Isolation    How often do you feel lonely or isolated from those around you?  Rarely        Alcohol Use    Q1: How often do you have a drink containing alcohol? Never     Q2: How many drinks containing alcohol do you have on a typical day when you are drinking? Patient does not drink     Q3: How often do you have six or more drinks on one occasion? Never        Utilities    In the past 12 months has the electric, gas, oil, or water company threatened to shut off services in your home? No        Health Literacy    How often do you need to have someone help you when you read instructions, pamphlets, or other written material from your doctor or pharmacy? Rarely                     Readmission Assessment (most recent)       Readmission Assessment - 03/04/25 1525          Readmission    Was this a planned readmission? No     Why were you hospitalized  in the last 30 days? Complete heart block     Why were you readmitted? New medical problem     When you left the hospital how did you feel? good     When you left the hospital where did you go? Home with Home Health     Did patient/caregiver refused recommended DC plan? No     Tell me about what happened between when you left the hospital and the day you returned. SOB     When did you start not feeling well? 1-2 days ago     Did you try to manage your symptoms your self? Yes     What did you do? rest     Did you call anyone? No     Why? unsure     Did you try to see or did see a doctor or nurse before you came? No     Why? unsure     Did you have  a follow-up appointment on discharge? Yes     Did you go? Yes

## 2025-03-04 NOTE — ASSESSMENT & PLAN NOTE
Patient with known CAD s/p stent placement and CABG, which is controlled Will continue  brilinta, ASA, and Statin and monitor for S/Sx of angina/ACS. Continue to monitor on telemetry.   02/19   -S/p heart catheterization on 02/19, [facility] with patent stents and moderate LAD/RCA disease - no culprit identified

## 2025-03-04 NOTE — ASSESSMENT & PLAN NOTE
Anemia is likely due to chronic disease due to Chronic Kidney Disease. Most recent hemoglobin and hematocrit are listed below.  Recent Labs     03/02/25  2104 03/03/25  0404 03/04/25  0631   HGB 8.5* 7.5* 7.5*   HCT 27.1* 24.4* 24.7*       Plan  - Monitor serial CBC: Daily  - Transfuse PRBC if patient becomes hemodynamically unstable, symptomatic or H/H drops below 7/21.  - Patient has not received any PRBC transfusions to date  - Patient's anemia is currently stable   rec'd Certification and Plan of Care, placed in doctor's folder for review and signature

## 2025-03-05 LAB
ALBUMIN SERPL BCP-MCNC: 2.4 G/DL (ref 3.5–5.2)
ALP SERPL-CCNC: 123 U/L (ref 40–150)
ALT SERPL W/O P-5'-P-CCNC: 10 U/L (ref 10–44)
ANION GAP SERPL CALC-SCNC: 8 MMOL/L (ref 8–16)
ANION GAP SERPL CALC-SCNC: 8 MMOL/L (ref 8–16)
AST SERPL-CCNC: 25 U/L (ref 10–40)
BASOPHILS # BLD AUTO: 0.05 K/UL (ref 0–0.2)
BASOPHILS NFR BLD: 0.3 % (ref 0–1.9)
BILIRUB SERPL-MCNC: 0.4 MG/DL (ref 0.1–1)
BUN SERPL-MCNC: 14 MG/DL (ref 8–23)
BUN SERPL-MCNC: 7 MG/DL (ref 8–23)
CALCIUM SERPL-MCNC: 8 MG/DL (ref 8.7–10.5)
CALCIUM SERPL-MCNC: 8.4 MG/DL (ref 8.7–10.5)
CHLORIDE SERPL-SCNC: 103 MMOL/L (ref 95–110)
CHLORIDE SERPL-SCNC: 104 MMOL/L (ref 95–110)
CO2 SERPL-SCNC: 22 MMOL/L (ref 23–29)
CO2 SERPL-SCNC: 23 MMOL/L (ref 23–29)
CREAT SERPL-MCNC: 1.1 MG/DL (ref 0.5–1.4)
CREAT SERPL-MCNC: 1.5 MG/DL (ref 0.5–1.4)
DIFFERENTIAL METHOD BLD: ABNORMAL
EOSINOPHIL # BLD AUTO: 0.1 K/UL (ref 0–0.5)
EOSINOPHIL NFR BLD: 0.4 % (ref 0–8)
ERYTHROCYTE [DISTWIDTH] IN BLOOD BY AUTOMATED COUNT: 15.9 % (ref 11.5–14.5)
EST. GFR  (NO RACE VARIABLE): 36.3 ML/MIN/1.73 M^2
EST. GFR  (NO RACE VARIABLE): 52.7 ML/MIN/1.73 M^2
GLUCOSE SERPL-MCNC: 110 MG/DL (ref 70–110)
GLUCOSE SERPL-MCNC: 170 MG/DL (ref 70–110)
HCT VFR BLD AUTO: 26.5 % (ref 37–48.5)
HGB BLD-MCNC: 7.9 G/DL (ref 12–16)
IMM GRANULOCYTES # BLD AUTO: 0.1 K/UL (ref 0–0.04)
IMM GRANULOCYTES NFR BLD AUTO: 0.6 % (ref 0–0.5)
LYMPHOCYTES # BLD AUTO: 1.2 K/UL (ref 1–4.8)
LYMPHOCYTES NFR BLD: 7.1 % (ref 18–48)
MAGNESIUM SERPL-MCNC: 1.7 MG/DL (ref 1.6–2.6)
MAGNESIUM SERPL-MCNC: 1.7 MG/DL (ref 1.6–2.6)
MCH RBC QN AUTO: 27.7 PG (ref 27–31)
MCHC RBC AUTO-ENTMCNC: 29.8 G/DL (ref 32–36)
MCV RBC AUTO: 93 FL (ref 82–98)
MONOCYTES # BLD AUTO: 1.3 K/UL (ref 0.3–1)
MONOCYTES NFR BLD: 8 % (ref 4–15)
NEUTROPHILS # BLD AUTO: 13.6 K/UL (ref 1.8–7.7)
NEUTROPHILS NFR BLD: 83.6 % (ref 38–73)
NRBC BLD-RTO: 0 /100 WBC
PHOSPHATE SERPL-MCNC: 2.3 MG/DL (ref 2.7–4.5)
PLATELET # BLD AUTO: 242 K/UL (ref 150–450)
PMV BLD AUTO: 12 FL (ref 9.2–12.9)
POCT GLUCOSE: 134 MG/DL (ref 70–110)
POCT GLUCOSE: 166 MG/DL (ref 70–110)
POCT GLUCOSE: 171 MG/DL (ref 70–110)
POCT GLUCOSE: 177 MG/DL (ref 70–110)
POTASSIUM SERPL-SCNC: 3.4 MMOL/L (ref 3.5–5.1)
POTASSIUM SERPL-SCNC: 3.8 MMOL/L (ref 3.5–5.1)
PROT SERPL-MCNC: 6 G/DL (ref 6–8.4)
RBC # BLD AUTO: 2.85 M/UL (ref 4–5.4)
SODIUM SERPL-SCNC: 133 MMOL/L (ref 136–145)
SODIUM SERPL-SCNC: 135 MMOL/L (ref 136–145)
WBC # BLD AUTO: 16.29 K/UL (ref 3.9–12.7)

## 2025-03-05 PROCEDURE — 25000003 PHARM REV CODE 250: Mod: HCNC | Performed by: NURSE PRACTITIONER

## 2025-03-05 PROCEDURE — 25000242 PHARM REV CODE 250 ALT 637 W/ HCPCS: Mod: HCNC | Performed by: NURSE PRACTITIONER

## 2025-03-05 PROCEDURE — 83735 ASSAY OF MAGNESIUM: CPT | Mod: 91,HCNC | Performed by: HOSPITALIST

## 2025-03-05 PROCEDURE — 80053 COMPREHEN METABOLIC PANEL: CPT | Mod: HCNC | Performed by: NURSE PRACTITIONER

## 2025-03-05 PROCEDURE — 85025 COMPLETE CBC W/AUTO DIFF WBC: CPT | Mod: HCNC | Performed by: NURSE PRACTITIONER

## 2025-03-05 PROCEDURE — 83735 ASSAY OF MAGNESIUM: CPT | Mod: HCNC | Performed by: NURSE PRACTITIONER

## 2025-03-05 PROCEDURE — 36415 COLL VENOUS BLD VENIPUNCTURE: CPT | Mod: HCNC | Performed by: HOSPITALIST

## 2025-03-05 PROCEDURE — 25000003 PHARM REV CODE 250: Mod: HCNC | Performed by: HOSPITALIST

## 2025-03-05 PROCEDURE — 80048 BASIC METABOLIC PNL TOTAL CA: CPT | Mod: HCNC,XB | Performed by: HOSPITALIST

## 2025-03-05 PROCEDURE — 90935 HEMODIALYSIS ONE EVALUATION: CPT | Mod: HCNC

## 2025-03-05 PROCEDURE — 84100 ASSAY OF PHOSPHORUS: CPT | Mod: HCNC | Performed by: NURSE PRACTITIONER

## 2025-03-05 PROCEDURE — 63600175 PHARM REV CODE 636 W HCPCS: Mod: HCNC | Performed by: NURSE PRACTITIONER

## 2025-03-05 PROCEDURE — 63600175 PHARM REV CODE 636 W HCPCS: Mod: HCNC | Performed by: INTERNAL MEDICINE

## 2025-03-05 PROCEDURE — 21400001 HC TELEMETRY ROOM: Mod: HCNC

## 2025-03-05 PROCEDURE — 36415 COLL VENOUS BLD VENIPUNCTURE: CPT | Mod: HCNC | Performed by: NURSE PRACTITIONER

## 2025-03-05 RX ORDER — SODIUM CHLORIDE 9 MG/ML
INJECTION, SOLUTION INTRAVENOUS ONCE
Status: CANCELLED | OUTPATIENT
Start: 2025-03-06

## 2025-03-05 RX ORDER — AMOXICILLIN 250 MG
1 CAPSULE ORAL DAILY PRN
Status: DISCONTINUED | OUTPATIENT
Start: 2025-03-05 | End: 2025-03-12 | Stop reason: HOSPADM

## 2025-03-05 RX ORDER — FUROSEMIDE 80 MG/1
80 TABLET ORAL 2 TIMES DAILY
Status: DISCONTINUED | OUTPATIENT
Start: 2025-03-05 | End: 2025-03-06

## 2025-03-05 RX ORDER — SODIUM CHLORIDE 9 MG/ML
INJECTION, SOLUTION INTRAVENOUS
Status: CANCELLED | OUTPATIENT
Start: 2025-03-06

## 2025-03-05 RX ORDER — MUPIROCIN 20 MG/G
OINTMENT TOPICAL 2 TIMES DAILY
Status: COMPLETED | OUTPATIENT
Start: 2025-03-05 | End: 2025-03-09

## 2025-03-05 RX ORDER — LORAZEPAM 1 MG/1
1 TABLET ORAL DAILY
Status: DISCONTINUED | OUTPATIENT
Start: 2025-03-05 | End: 2025-03-12 | Stop reason: HOSPADM

## 2025-03-05 RX ADMIN — TICAGRELOR 60 MG: 60 TABLET ORAL at 08:03

## 2025-03-05 RX ADMIN — FUROSEMIDE 80 MG: 80 TABLET ORAL at 09:03

## 2025-03-05 RX ADMIN — HEPARIN SODIUM 5000 UNITS: 5000 INJECTION INTRAVENOUS; SUBCUTANEOUS at 05:03

## 2025-03-05 RX ADMIN — FUROSEMIDE 80 MG: 80 TABLET ORAL at 06:03

## 2025-03-05 RX ADMIN — FLUOXETINE HYDROCHLORIDE 40 MG: 20 CAPSULE ORAL at 09:03

## 2025-03-05 RX ADMIN — POLYETHYLENE GLYCOL 3350 17 G: 17 POWDER, FOR SOLUTION ORAL at 09:03

## 2025-03-05 RX ADMIN — ATORVASTATIN CALCIUM 80 MG: 40 TABLET, FILM COATED ORAL at 08:03

## 2025-03-05 RX ADMIN — INSULIN GLARGINE 5 UNITS: 100 INJECTION, SOLUTION SUBCUTANEOUS at 08:03

## 2025-03-05 RX ADMIN — HEPARIN SODIUM 5000 UNITS: 5000 INJECTION INTRAVENOUS; SUBCUTANEOUS at 08:03

## 2025-03-05 RX ADMIN — PANTOPRAZOLE SODIUM 40 MG: 40 TABLET, DELAYED RELEASE ORAL at 09:03

## 2025-03-05 RX ADMIN — METOPROLOL SUCCINATE 25 MG: 25 TABLET, EXTENDED RELEASE ORAL at 09:03

## 2025-03-05 RX ADMIN — QUETIAPINE FUMARATE 25 MG: 25 TABLET ORAL at 10:03

## 2025-03-05 RX ADMIN — ASPIRIN 81 MG CHEWABLE TABLET 81 MG: 81 TABLET CHEWABLE at 09:03

## 2025-03-05 RX ADMIN — MUPIROCIN: 20 OINTMENT TOPICAL at 09:03

## 2025-03-05 RX ADMIN — MUPIROCIN: 20 OINTMENT TOPICAL at 08:03

## 2025-03-05 RX ADMIN — LORAZEPAM 1 MG: 1 TABLET ORAL at 06:03

## 2025-03-05 RX ADMIN — TICAGRELOR 60 MG: 60 TABLET ORAL at 09:03

## 2025-03-05 RX ADMIN — HYDROXYZINE HYDROCHLORIDE 25 MG: 25 TABLET ORAL at 12:03

## 2025-03-05 RX ADMIN — ISOSORBIDE MONONITRATE 60 MG: 60 TABLET, EXTENDED RELEASE ORAL at 09:03

## 2025-03-05 RX ADMIN — HYDRALAZINE HYDROCHLORIDE 50 MG: 25 TABLET ORAL at 08:03

## 2025-03-05 RX ADMIN — HEPARIN SODIUM 1000 UNITS: 1000 INJECTION, SOLUTION INTRAVENOUS; SUBCUTANEOUS at 05:03

## 2025-03-05 NOTE — PROGRESS NOTES
03/05/25 1657 03/05/25 1700        Hemodialysis Catheter 02/21/25 1425 right internal jugular   Placement Date/Time: 02/21/25 1425   Present Prior to Hospital Arrival?: No  Hand Hygiene: Performed  Barrier Precautions: Performed  Skin Antisepsis: ChloraPrep  Hemodialysis Catheter Type: Tunneled catheter  Location: right internal jugular  Cathete...   Site Assessment  --  No drainage;No redness;No swelling;No warmth   Line Securement Device  --  Secured with sutureless device   Dressing Type  --  CHG impregnated dressing/sponge   Dressing Status  --  Clean;Dry;Intact   Dressing Intervention  --  Integrity maintained   Date on Dressing  --  03/04/25   Dressing Due to be Changed  --  03/11/25   Venous Patency/Care  --  deaccessed;flushed w/o difficulty;heparin locked   Arterial Patency/Care  --  deaccessed;flushed w/o difficulty;heparin locked   During Hemodialysis Assessment   Blood Flow Rate (mL/min) 400 mL/min  --    Dialysate Flow Rate (mL/min) 700 ml/min  --    Ultrafiltration Rate (mL/Hr) 1220 mL/Hr  --    Blood Pump Running Yes  --    Arteriovenous Lines Secure Yes  --    Arterial Pressure (mmHg) -80 mmHg  --    Venous Pressure (mmHg) 100  --    Blood Volume Processed (Liters) 68.9 L  --    UF Removed (mL) 3650 mL  --    TMP 60  --    Venous Line in Air Detector Yes  --    Intake (mL) 250 mL  --    Hemosafe Clip On Yes  --    Transducer Dry Yes  --    Access Visible Yes  --     notified of access issue? N/A  --    Heparin given? N/A  --    Intra-Hemodialysis Comments HD completed  --    Post-Hemodialysis Assessment   Rinseback Volume (mL) 250 mL  --    Blood Volume Processed (Liters) 68.9 L  --    Dialyzer Clearance Moderately streaked  --    Duration of Treatment 180 minutes  --    Total UF (mL) 3650 mL  --    Net Fluid Removal 3000  --    Patient Response to Treatment Julien. well  --      HD completed. Patient left [facility] by stretcher in NAD/VSS.

## 2025-03-05 NOTE — SUBJECTIVE & OBJECTIVE
Interval History: see above    Review of Systems   Constitutional:  Positive for activity change. Negative for appetite change and fever.   HENT:  Negative for trouble swallowing.    Respiratory:  Positive for shortness of breath. Negative for cough.    Cardiovascular:  Negative for chest pain and leg swelling.   Gastrointestinal:  Positive for abdominal distention and abdominal pain. Negative for constipation, diarrhea and nausea.   Genitourinary:  Negative for difficulty urinating.   Musculoskeletal:  Negative for back pain, gait problem and joint swelling.   Skin:         Bottom discomfort related to pressure in bed, daniel w mattress overlay in the ED   Neurological:  Negative for numbness.   Psychiatric/Behavioral:  Negative for behavioral problems and confusion.      Objective:     Vital Signs (Most Recent):  Temp: 98.3 °F (36.8 °C) (03/05/25 0441)  Pulse: 91 (03/05/25 0441)  Resp: 18 (03/05/25 0441)  BP: (!) 142/63 (03/05/25 0441)  SpO2: 98 % (03/05/25 0441) Vital Signs (24h Range):  Temp:  [98.2 °F (36.8 °C)-98.5 °F (36.9 °C)] 98.3 °F (36.8 °C)  Pulse:  [61-98] 91  Resp:  [18-20] 18  SpO2:  [96 %-99 %] 98 %  BP: (103-162)/(56-67) 142/63     Weight: 85.7 kg (189 lb)  Body mass index is 27.91 kg/m².    Intake/Output Summary (Last 24 hours) at 3/5/2025 0733  Last data filed at 3/4/2025 1836  Gross per 24 hour   Intake 250 ml   Output 2622 ml   Net -2372 ml         Physical Exam  Constitutional:       General: She is not in acute distress.     Appearance: Normal appearance.   HENT:      Head: Normocephalic and atraumatic.      Nose: Nose normal.      Mouth/Throat:      Mouth: Mucous membranes are moist.   Eyes:      General: No scleral icterus.     Extraocular Movements: Extraocular movements intact.      Pupils: Pupils are equal, round, and reactive to light.   Cardiovascular:      Rate and Rhythm: Normal rate and regular rhythm.      Pulses: Normal pulses.      Heart sounds: Murmur heard.      No gallop.    Pulmonary:      Effort: Pulmonary effort is normal.      Breath sounds: Normal breath sounds. No wheezing, rhonchi or rales.      Comments: Decreased BS at bases bilateraly  Chest:      Chest wall: No tenderness.   Abdominal:      General: Abdomen is flat. Bowel sounds are normal. There is no distension.      Palpations: Abdomen is soft.      Tenderness: There is no abdominal tenderness. There is no right CVA tenderness, left CVA tenderness, guarding or rebound.   Musculoskeletal:         General: No swelling, tenderness or deformity. Normal range of motion.      Cervical back: Normal range of motion and neck supple. No rigidity or tenderness.      Right lower leg: Edema present.      Left lower leg: Edema present.   Skin:     General: Skin is warm and dry.      Coloration: Skin is not jaundiced or pale.      Findings: No erythema or rash.   Neurological:      General: No focal deficit present.      Mental Status: She is alert. Mental status is at baseline.      Cranial Nerves: No cranial nerve deficit.      Motor: No weakness.   Psychiatric:         Thought Content: Thought content normal.         Judgment: Judgment normal.             Significant Labs: All pertinent labs within the past 24 hours have been reviewed.  CBC:   Recent Labs   Lab 03/04/25  0631 03/05/25  0339   WBC 10.82 16.29*   HGB 7.5* 7.9*   HCT 24.7* 26.5*    242     CMP:   Recent Labs   Lab 03/04/25  0631 03/05/25  0339   * 133*   K 3.3* 3.4*    103   CO2 22* 22*   GLU 93 110   BUN 25* 14   CREATININE 2.0* 1.5*   CALCIUM 8.1* 8.0*   PROT 6.1 6.0   ALBUMIN 2.6* 2.4*   BILITOT 0.4 0.4   ALKPHOS 119 123   AST 24 25   ALT 11 10   ANIONGAP 8 8       Significant Imaging: I have reviewed all pertinent imaging results/findings within the past 24 hours.

## 2025-03-05 NOTE — ASSESSMENT & PLAN NOTE
KYA is likely due to  cardiorenal . Renal failure due to cardiogenic shock from complete heart block w heart cath on 2/19. Last HD session 3/1/25. She was admitted overnight for SOB and chest tightness since discharge and leg pain attributed to swelling. Cr stable overnight from 2.1 to 1.9. Anuric since starting HD.       Baseline creatinine is unknown. Most recent creatinine and eGFR are listed below.  Recent Labs     03/03/25  0520 03/04/25  0631 03/05/25  0339   CREATININE 1.9* 2.0* 1.5*   EGFRNORACEVR 27.4* 25.7* 36.3*        Plan  - KYA is improving  - Avoid nephrotoxins and renally dose meds for GFR listed above  - Monitor urine output, serial BMP, and adjust therapy as needed    3/4-  HD today, pt reported urinations

## 2025-03-05 NOTE — ASSESSMENT & PLAN NOTE
Patient's blood pressure range in the last 24 hours was: BP  Min: 103/57  Max: 162/67.The patient's inpatient anti-hypertensive regimen is listed below:  Current Antihypertensives  isosorbide mononitrate 24 hr tablet 60 mg, Daily, Oral  metoprolol succinate (TOPROL-XL) 24 hr tablet 25 mg, Daily, Oral  hydrALAZINE tablet 50 mg, Every 8 hours, Oral  furosemide tablet 80 mg, 2 times daily, Oral    Plan  - BP is controlled, no changes needed to their regimen    3/4- /58   Pulse 98. HD planned today.

## 2025-03-05 NOTE — ASSESSMENT & PLAN NOTE
Patient's FSGs are controlled on current hypoglycemics.   Last A1c reviewed-   Lab Results   Component Value Date    HGBA1C 6.6 (H) 02/27/2025     Most recent fingerstick glucose reviewed-   Recent Labs   Lab 03/04/25  0802 03/04/25  1618 03/04/25 2124   POCTGLUCOSE 101 184* 171*       Current correctional scale  Low  Maintain anti-hyperglycemic dose as follows-   Antihyperglycemics (From admission, onward)    Start     Stop Route Frequency Ordered    03/03/25 2100  insulin glargine U-100 (Lantus) pen 5 Units         -- SubQ Nightly 03/03/25 0233    03/03/25 0227  insulin aspart U-100 pen 0-5 Units         -- SubQ Before meals & nightly PRN 03/03/25 0128      -Accuchecks AC/HS  -Diabetic/renal diet

## 2025-03-05 NOTE — PROGRESS NOTES
03/05/25 1350 03/05/25 1356        Hemodialysis Catheter 02/21/25 1425 right internal jugular   Placement Date/Time: 02/21/25 1425   Present Prior to Hospital Arrival?: No  Hand Hygiene: Performed  Barrier Precautions: Performed  Skin Antisepsis: ChloraPrep  Hemodialysis Catheter Type: Tunneled catheter  Location: right internal jugular  Cathete...   Site Assessment No drainage;No redness;No swelling;No warmth  --    Line Securement Device Secured with sutureless device  --    Dressing Type CHG impregnated dressing/sponge  --    Dressing Status Clean;Dry;Intact  --    Dressing Intervention Integrity maintained  --    Date on Dressing 03/04/25  --    Dressing Due to be Changed 03/11/25  --    Venous Patency/Care accessed;blood return present;flushed w/o difficulty  --    Arterial Patency/Care accessed;blood return present;flushed w/o difficulty  --    During Hemodialysis Assessment   Blood Flow Rate (mL/min)  --  400 mL/min   Dialysate Flow Rate (mL/min)  --  700 ml/min   Ultrafiltration Rate (mL/Hr)  --  1220 mL/Hr   Blood Pump Running  --  Yes   Arteriovenous Lines Secure  --  Yes   Arterial Pressure (mmHg)  --  -80 mmHg   Venous Pressure (mmHg)  --  100   Blood Volume Processed (Liters)  --  0 L   UF Removed (mL)  --  0 mL   TMP  --  60   Venous Line in Air Detector  --  Yes   Intake (mL)  --  250 mL   Hemosafe Clip On  --  Yes   Transducer Dry  --  Yes   Access Visible  --  Yes    notified of access issue?  --  N/A   Heparin given?  --  N/A   Intra-Hemodialysis Comments  --  HD started     Patient arrived [facility] by stretcher. HD started.

## 2025-03-05 NOTE — ASSESSMENT & PLAN NOTE
Echocardiogram with evidence of aortic stenosis that is severe. The patient's most recent echocardiogram result is listed below. We will manage the valvular abnormality by GDMT.    Echo  Result Date: 3/3/2025    Left Ventricle: The left ventricle is normal in size. Ventricular mass   is normal. Normal wall thickness. There is concentric remodeling. There is   normal systolic function with a visually estimated ejection fraction of   55%. Quantitated ejection fraction is 50%. There is diastolic dysfunction.   Elevated left ventricular filling pressure.    Left Ventricle: Regional wall motion abnormalities present.    Right Ventricle: The right ventricle is normal in size. Systolic   function is borderline low.    Left Atrium: severely dilated    Aortic Valve: There is moderate aortic valve sclerosis. Severely   restricted motion. There is severe stenosis. Aortic valve area by VTI is   0.7 cm2. Aortic valve peak velocity is 4.2 m/s. Mean gradient is 40 mmHg.   The dimensionless index is 0.23.    Mitral Valve: There is mild anterior leaflet sclerosis. There is severe   posterior mitral annular calcification present. There is mild to moderate   stenosis. The mean pressure gradient across the mitral valve is 6 mmHg at   a heart rate of 89 bpm. There is mild to moderate regurgitation. MVA   1.9cm2 by planimetry    Tricuspid Valve: There is moderate regurgitation.    Pulmonary Artery: The estimated pulmonary artery systolic pressure is   73 mmHg.    IVC/SVC: Elevated venous pressure at 15 mmHg.    Pericardium: There is a moderate circumferential effusion. Bilateral   pleural effusions.    <30% respiratory variation across mitral valve, no diastolic RV   collapse, however effusion moderate and new, would continue to monitor   closely.        Echo Saline Bubble? No  Result Date: 2/19/2025    Left Ventricle: The left ventricle is normal in size. There is mild   concentric hypertrophy. Septal motion is consistent  RFCA in progress.    with pacing. There is   normal systolic function with a visually estimated ejection fraction of 60   - 65%. Grade II diastolic dysfunction.    Right Ventricle: Mild right ventricular enlargement. Systolic function   is normal.    Left Atrium: Left atrium is moderately dilated.    Right Atrium: Right atrium is mildly dilated.    Aortic Valve: There is moderate stenosis. Aortic valve area by VTI is   1.0 cm². Aortic valve peak velocity is 3.2 m/s. Mean gradient is 24 mmHg.   The dimensionless index is 0.33.    Mitral Valve: There is mild stenosis. The mean pressure gradient across   the mitral valve is 12 mmHg at a heart rate of  bpm. There is mild   regurgitation.    Tricuspid Valve: There is moderate regurgitation.    Pulmonary Artery: The estimated pulmonary artery systolic pressure is   72 mmHg.    IVC/SVC: Intermediate venous pressure at 8 mmHg.    Pericardium: There is a trivial posterior effusion.        Echo  Result Date: 2/15/2025    Left Ventricle: The left ventricle is normal in size. There is moderate   concentric hypertrophy. There is hyperdynamic systolic function with a   visually estimated ejection fraction of 70 - 75%.    Right Ventricle: Normal right ventricular cavity size. Systolic   function is normal.    Left Atrium: Left atrium is moderately dilated.    Right Atrium: Right atrium is mildly dilated.    Aortic Valve: There is moderate stenosis. Aortic valve area by VTI is   0.8 cm². Aortic valve peak velocity is 3.4 m/s. Mean gradient is 27 mmHg.   The dimensionless index is 0.27.    Mitral Valve: There is mild stenosis. The mean pressure gradient across   the mitral valve is 5 mmHg at a heart rate of 42  bpm. There is mild   regurgitation.    Tricuspid Valve: There is moderate regurgitation.    Pulmonary Artery: The estimated pulmonary artery systolic pressure is   49 mmHg.    IVC/SVC: Intermediate venous pressure at 8 mmHg.        Echo  Result Date: 10/31/2024     Left Ventricle: The left ventricle is normal in size. Normal wall   thickness. Normal wall motion. There is normal systolic function with a   visually estimated ejection fraction of 60 - 65%. Grade II diastolic   dysfunction. Elevated left ventricular filling pressure.    Right Ventricle: Normal right ventricular cavity size. Wall thickness   is normal. Systolic function is normal.    Left Atrium: Left atrium is severely dilated.    Aortic Valve: There is moderate aortic valve sclerosis. Moderately   restricted motion. There is moderate stenosis. Aortic valve area by VTI is   1.1 cm2. Aortic valve peak velocity is 3.1 m/s. Mean gradient is 20.1   mmHg. The dimensionless index is 0.37.    Mitral Valve: There is mild regurgitation.    Tricuspid Valve: There is mild regurgitation.    Pulmonary Artery: There is pulmonary hypertension. The estimated   pulmonary artery systolic pressure is 46 mmHg.    IVC/SVC: Normal venous pressure at 3 mmHg.

## 2025-03-05 NOTE — PROGRESS NOTES
Patient outreached to see if she received the care she was needing.  Patient currently admitted in [facility].

## 2025-03-05 NOTE — ASSESSMENT & PLAN NOTE
Acute kidney injury superimposed on stage 3b chronic kidney disease   Recently diagnosed with KYA due to cardiogenic shock secondary to complete heart block, initially improved. However developed NSTEMI and underwent LHC 2/19 and was initiated on HD on 2/22 while admitted at [facility]. Pt reports worsening SOB, diffuse body swelling, and orthopnea since discharge. States since initiation of HD she has been anuric. No missed HD treatments, last HD session 3/1/25. Of note her daily lasix was discontinued at discharge.    Pt reports Lasix was discontinued after dc last week.     Baseline creatinine is ~2.0. Most recent creatinine and eGFR are listed below.  Recent Labs     03/02/25  2104   CREATININE 2.1*   EGFRNORACEVR 24.3*      Plan  - KYA is stable  - Avoid nephrotoxins and renally dose meds for GFR listed above  - Monitor urine output, serial BMP, and adjust therapy as needed  - Consult nephrology for HD.   - Trial lasix while awaiting HD.  - UA, urine Na, and urine Protein/Cr ratio  - Renal US pending.  - Daily wt/ strict I&Os  - Renal diet/ 1.5L FR    3/3- lasix 80 mg IV bid. HD per nephrology.  3/4- HD today for volume removal. > 2 liters removed  3/5- need more volume removal.  Converted to po lasix

## 2025-03-05 NOTE — CLINICAL REVIEW
Nephrology Chart Review      Intake/Output Summary (Last 24 hours) at 3/5/2025 0702  Last data filed at 3/4/2025 1836  Gross per 24 hour   Intake 250 ml   Output 2622 ml   Net -2372 ml       Vitals:    03/04/25 2307 03/05/25 0008 03/05/25 0340 03/05/25 0441   BP:  (!) 113/56  (!) 142/63   BP Location:       Patient Position:       Pulse: 87 91 61 91   Resp:  18  18   Temp:    98.3 °F (36.8 °C)   TempSrc:       SpO2:  96%  98%   Weight:       Height:           Recent Labs   Lab 03/03/25  0520 03/04/25  0631 03/05/25  0339   * 132* 133*   K 3.2* 3.3* 3.4*    102 103   CO2 23 22* 22*   BUN 20 25* 14   CREATININE 1.9* 2.0* 1.5*   CALCIUM 8.2* 8.1* 8.0*   PHOS 2.7 3.1 2.3*     Labs stable. Next HD session today if staffing allows. Per primary team she is reporting more SOB. Will attempt to pull 3 L UF as tolerated by BP.     No other related issues identified. Please call Nephrology as needed; We will continue to follow.

## 2025-03-05 NOTE — ASSESSMENT & PLAN NOTE
Anemia is likely due to chronic disease due to Chronic Kidney Disease. Most recent hemoglobin and hematocrit are listed below.  Recent Labs     03/03/25  0404 03/04/25  0631 03/05/25  0339   HGB 7.5* 7.5* 7.9*   HCT 24.4* 24.7* 26.5*       Plan  - Monitor serial CBC: Daily  - Transfuse PRBC if patient becomes hemodynamically unstable, symptomatic or H/H drops below 7/21.  - Patient has not received any PRBC transfusions to date  - Patient's anemia is currently stable

## 2025-03-05 NOTE — ASSESSMENT & PLAN NOTE
Patient with known CAD s/p stent placement and CABG, which is controlled Will continue  brilinta, ASA, and Statin and monitor for S/Sx of angina/ACS. Continue to monitor on telemetry.   02/19   -S/p heart catheterization on 02/19, [facility] with patent stents and moderate LAD/RCA disease - no culprit identified

## 2025-03-05 NOTE — ASSESSMENT & PLAN NOTE
Pulmonary hypertension -- echo 11/2019   Aortic stenosis  is severe, with mitral regurgitation, suspect cardiorenal syndrome due to acute CHF. Renal failure due to recent Cardiac Shock due to complete HB.    Has LE edema, pulmonary edema, pericardial and pleural effusions.     Patient is identified as having Diastolic (HFpEF) heart failure that is Acute on Chronic. CHF is currently uncontrolled due to volume overload due to: Continued edema of extremities, Rales/crackles on pulmonary exam, and Pulmonary edema/pleural effusion on CXR. Latest ECHO performed and demonstrates- Results for orders placed during the hospital encounter of 02/15/25    Echo Saline Bubble? No    Interpretation Summary    Left Ventricle: The left ventricle is normal in size. There is mild concentric hypertrophy. Septal motion is consistent with pacing. There is normal systolic function with a visually estimated ejection fraction of 60 - 65%. Grade II diastolic dysfunction.    Right Ventricle: Mild right ventricular enlargement. Systolic function is normal.    Left Atrium: Left atrium is moderately dilated.    Right Atrium: Right atrium is mildly dilated.    Aortic Valve: There is moderate stenosis. Aortic valve area by VTI is 1.0 cm². Aortic valve peak velocity is 3.2 m/s. Mean gradient is 24 mmHg. The dimensionless index is 0.33.    Mitral Valve: There is mild stenosis. The mean pressure gradient across the mitral valve is 12 mmHg at a heart rate of  bpm. There is mild regurgitation.    Tricuspid Valve: There is moderate regurgitation.    Pulmonary Artery: The estimated pulmonary artery systolic pressure is 72 mmHg.    IVC/SVC: Intermediate venous pressure at 8 mmHg.    Pericardium: There is a trivial posterior effusion.  . Continue Nitrate/Vasodilator and monitor clinical status closely. Monitor on telemetry. Patient is off CHF pathway.  Monitor strict Is&Os and daily weights.  Place on fluid restriction of 1.5 L. Continue to stress to  patient importance of self efficacy and  on diet for CHF. Last BNP reviewed- and noted below   Recent Labs   Lab 03/02/25  2104   BNP 1,335*     -Received 100mg IV lasix x1, will monitor UO. Can continue IV lasix bid if pt has UO.  -Will add back metoprolol 25mg qd.  -ECHO 3/3:    Left Ventricle: The left ventricle is normal in size. Ventricular mass is normal. Normal wall thickness. There is concentric remodeling. There is normal systolic function with a visually estimated ejection fraction of 55%. Quantitated ejection fraction is 50%. There is diastolic dysfunction. Elevated left ventricular filling pressure.    Left Ventricle: Regional wall motion abnormalities present.    Right Ventricle: The right ventricle is normal in size. Systolic function is borderline low.    Left Atrium: severely dilated    Aortic Valve: There is moderate aortic valve sclerosis. Severely restricted motion. There is severe stenosis. Aortic valve area by VTI is 0.7 cm2. Aortic valve peak velocity is 4.2 m/s. Mean gradient is 40 mmHg. The dimensionless index is 0.23.    Mitral Valve: There is mild anterior leaflet sclerosis. There is severe posterior mitral annular calcification present. There is mild to moderate stenosis. The mean pressure gradient across the mitral valve is 6 mmHg at a heart rate of 89 bpm. There is mild to moderate MITRAL regurgitation. MVA 1.9cm2 by planimetry    Tricuspid Valve: There is moderate regurgitation.    Pulmonary Artery: The estimated pulmonary artery systolic pressure is 73 mmHg.    IVC/SVC: Elevated venous pressure at 15 mmHg.    Pericardium: There is a moderate circumferential effusion. Bilateral pleural effusions.    <30% respiratory variation across mitral valve, no diastolic RV collapse, however effusion moderate and new, would continue to monitor closely.    3/4- Volume removal per HD. On lasix 80 iv. Hydralzine, imdur, metoprolol.

## 2025-03-05 NOTE — PROGRESS NOTES
[facility] Medicine  Progress Note    Patient Name: Lorena Contreras  MRN: [id number]  Patient Class: IP- Inpatient   Admission Date: 3/2/2025  Length of Stay: 2 days  Attending Physician: Kari Smith MD  Primary Care Provider: Jorge Paris MD        Subjective     Principal Problem:Hypervolemia associated with renal insufficiency        HPI:  Lorena Contreras is a 74 y.o. female with a PMHx DM2, fibromyalgia, lymphoma s/p chemo, HTN, HLD, CVA, MI, CAD s/p PCIs, HFpEF, anemia, KYA on CKD3b newly on HD (2/22/25), and recent admission at [facility] (2/15- 2/25/25) for CHB, KYA, and NSTEMI who presents to the emergency department with a chief complaint of worsening shortness of breath and chest tightness since discharge. Reports mild improvement of symptoms after HD lasting several hours. Endorses associated OSMAN, significant swelling to BLE and abdomen, orthopnea, wheezing, fatigue, and generalized weakness. Patient reports the chest tightness is non-radiating, substernal and is worse with MSK manipulation and deep breathing, but unaffected by exertion or positioning. Also reports diffuse pain to legs that she attributes to the swelling. She notes generalized abdominal discomfort, described as a dull, nagging pain. Does report nausea and several episodes of vomiting. States since initiation of HD she has been anuric. No missed HD treatments, last HD session 3/1/25. She denies fever/chills, cough, congestion, headache, or lightheadedness/dizziness.     In the ED, patient with tachypnea and mild tachycardia otherwise vitals stable, afebrile. No documented hypoxia, but placed on 2L O2 via NC for comfort. CBC with stable anemia and WBC 16.29. PT/INR WNL. Na 135. Bicarb 20. Cr 2.1 (bl prior to HD initiation ~2.0). Glucose 194. Calcium 8.6. Albumin 3.1. BNP 1,335. HS troponin 64>>57. EKG shows SR w/ bigeminy, 70 bpm, no ST elevation or depression. CXR with cardiomegaly with pulmonary edema  and bilateral pleural effusions. Aeration is similar to mildly worse when compared with 02/19/2025. New right-sided central venous catheter overlying the SVC. Abd US with cholelithiasis and gallbladder sludge without evidence of cholecystitis. Distended gallbladder. Hepatomegaly. Bilateral pleural effusion. The patient received tylenol and 100mg IV lasix.    Overview/Hospital Course:  3/3- new HD pt with volume overload. Has PPM and Hx of complete HB. /72 VSS. GDMT being resumed.  Metoprolol resumed, hydralazine dose reduced. Receiving lasix. Plan for volume removal in HD. UO 300ml and previously hadn't urinated since being started on HD in the hospital. Diuresing lasix 80 mg IV bid.  Pain: Takes oxycod 10 at home and she is asking for something stronger- will increase oxycod dose 5-> 10 mg now.     3/4- HD scheduled this am. K 3.3  should correct w HD.  On lasix 80 iv. Hydralzine, imdur, metoprolol. May benefit from jardiance after volume removal.     3/5- converted to po lasix. Pt re-accumulated fluid rapidly without it. Tolerated HD, over 2 liters removed.  K 3.4  wbc 16.  Likely needs more volume removal per HD. 98% on 2 liters. Nephrology planning to repet HD tomorrow.  This am, pt reporting more SOB, exam consistent w pleural effusion.  Some relief with sitting up. may need HD sooner.  Does not appear to be urinating.         Interval History: see above    Review of Systems   Constitutional:  Positive for activity change. Negative for appetite change and fever.   HENT:  Negative for trouble swallowing.    Respiratory:  Positive for shortness of breath. Negative for cough.    Cardiovascular:  Negative for chest pain and leg swelling.   Gastrointestinal:  Positive for abdominal distention and abdominal pain. Negative for constipation, diarrhea and nausea.   Genitourinary:  Negative for difficulty urinating.   Musculoskeletal:  Negative for back pain, gait problem and joint swelling.   Skin:         Bottom  discomfort related to pressure in bed, daniel w mattress overlay in the ED   Neurological:  Negative for numbness.   Psychiatric/Behavioral:  Negative for behavioral problems and confusion.      Objective:     Vital Signs (Most Recent):  Temp: 98.3 °F (36.8 °C) (03/05/25 0441)  Pulse: 91 (03/05/25 0441)  Resp: 18 (03/05/25 0441)  BP: (!) 142/63 (03/05/25 0441)  SpO2: 98 % (03/05/25 0441) Vital Signs (24h Range):  Temp:  [98.2 °F (36.8 °C)-98.5 °F (36.9 °C)] 98.3 °F (36.8 °C)  Pulse:  [61-98] 91  Resp:  [18-20] 18  SpO2:  [96 %-99 %] 98 %  BP: (103-162)/(56-67) 142/63     Weight: 85.7 kg (189 lb)  Body mass index is 27.91 kg/m².    Intake/Output Summary (Last 24 hours) at 3/5/2025 0733  Last data filed at 3/4/2025 1836  Gross per 24 hour   Intake 250 ml   Output 2622 ml   Net -2372 ml         Physical Exam  Constitutional:       General: She is not in acute distress.     Appearance: Normal appearance.   HENT:      Head: Normocephalic and atraumatic.      Nose: Nose normal.      Mouth/Throat:      Mouth: Mucous membranes are moist.   Eyes:      General: No scleral icterus.     Extraocular Movements: Extraocular movements intact.      Pupils: Pupils are equal, round, and reactive to light.   Cardiovascular:      Rate and Rhythm: Normal rate and regular rhythm.      Pulses: Normal pulses.      Heart sounds: Murmur heard.      No gallop.   Pulmonary:      Effort: Pulmonary effort is normal.      Breath sounds: Normal breath sounds. No wheezing, rhonchi or rales.      Comments: Decreased BS at bases bilateraly  Chest:      Chest wall: No tenderness.   Abdominal:      General: Abdomen is flat. Bowel sounds are normal. There is no distension.      Palpations: Abdomen is soft.      Tenderness: There is no abdominal tenderness. There is no right CVA tenderness, left CVA tenderness, guarding or rebound.   Musculoskeletal:         General: No swelling, tenderness or deformity. Normal range of motion.      Cervical back: Normal  range of motion and neck supple. No rigidity or tenderness.      Right lower leg: Edema present.      Left lower leg: Edema present.   Skin:     General: Skin is warm and dry.      Coloration: Skin is not jaundiced or pale.      Findings: No erythema or rash.   Neurological:      General: No focal deficit present.      Mental Status: She is alert. Mental status is at baseline.      Cranial Nerves: No cranial nerve deficit.      Motor: No weakness.   Psychiatric:         Thought Content: Thought content normal.         Judgment: Judgment normal.             Significant Labs: All pertinent labs within the past 24 hours have been reviewed.  CBC:   Recent Labs   Lab 03/04/25  0631 03/05/25  0339   WBC 10.82 16.29*   HGB 7.5* 7.9*   HCT 24.7* 26.5*    242     CMP:   Recent Labs   Lab 03/04/25  0631 03/05/25  0339   * 133*   K 3.3* 3.4*    103   CO2 22* 22*   GLU 93 110   BUN 25* 14   CREATININE 2.0* 1.5*   CALCIUM 8.1* 8.0*   PROT 6.1 6.0   ALBUMIN 2.6* 2.4*   BILITOT 0.4 0.4   ALKPHOS 119 123   AST 24 25   ALT 11 10   ANIONGAP 8 8       Significant Imaging: I have reviewed all pertinent imaging results/findings within the past 24 hours.    Assessment and Plan     * Hypervolemia associated with renal insufficiency  Acute kidney injury superimposed on stage 3b chronic kidney disease   Recently diagnosed with KYA due to cardiogenic shock secondary to complete heart block, initially improved. However developed NSTEMI and underwent LHC 2/19 and was initiated on HD on 2/22 while admitted at [facility]. Pt reports worsening SOB, diffuse body swelling, and orthopnea since discharge. States since initiation of HD she has been anuric. No missed HD treatments, last HD session 3/1/25. Of note her daily lasix was discontinued at discharge.    Pt reports Lasix was discontinued after dc last week.     Baseline creatinine is ~2.0. Most recent creatinine and eGFR are listed below.  Recent Labs     03/02/25  4362    CREATININE 2.1*   EGFRNORACEVR 24.3*      Plan  - KYA is stable  - Avoid nephrotoxins and renally dose meds for GFR listed above  - Monitor urine output, serial BMP, and adjust therapy as needed  - Consult nephrology for HD.   - Trial lasix while awaiting HD.  - UA, urine Na, and urine Protein/Cr ratio  - Renal US pending.  - Daily wt/ strict I&Os  - Renal diet/ 1.5L FR    3/3- lasix 80 mg IV bid. HD per nephrology.  3/4- HD today for volume removal. > 2 liters removed  3/5- need more volume removal.  Converted to po lasix    Acute on chronic diastolic heart failure  Pulmonary hypertension -- echo 11/2019   Aortic stenosis  is severe, with mitral regurgitation, suspect cardiorenal syndrome due to acute CHF. Renal failure due to recent Cardiac Shock due to complete HB.    Has LE edema, pulmonary edema, pericardial and pleural effusions.     Patient is identified as having Diastolic (HFpEF) heart failure that is Acute on Chronic. CHF is currently uncontrolled due to volume overload due to: Continued edema of extremities, Rales/crackles on pulmonary exam, and Pulmonary edema/pleural effusion on CXR. Latest ECHO performed and demonstrates- Results for orders placed during the hospital encounter of 02/15/25    Echo Saline Bubble? No    Interpretation Summary    Left Ventricle: The left ventricle is normal in size. There is mild concentric hypertrophy. Septal motion is consistent with pacing. There is normal systolic function with a visually estimated ejection fraction of 60 - 65%. Grade II diastolic dysfunction.    Right Ventricle: Mild right ventricular enlargement. Systolic function is normal.    Left Atrium: Left atrium is moderately dilated.    Right Atrium: Right atrium is mildly dilated.    Aortic Valve: There is moderate stenosis. Aortic valve area by VTI is 1.0 cm². Aortic valve peak velocity is 3.2 m/s. Mean gradient is 24 mmHg. The dimensionless index is 0.33.    Mitral Valve: There is mild stenosis. The mean  pressure gradient across the mitral valve is 12 mmHg at a heart rate of  bpm. There is mild regurgitation.    Tricuspid Valve: There is moderate regurgitation.    Pulmonary Artery: The estimated pulmonary artery systolic pressure is 72 mmHg.    IVC/SVC: Intermediate venous pressure at 8 mmHg.    Pericardium: There is a trivial posterior effusion.  . Continue Nitrate/Vasodilator and monitor clinical status closely. Monitor on telemetry. Patient is off CHF pathway.  Monitor strict Is&Os and daily weights.  Place on fluid restriction of 1.5 L. Continue to stress to patient importance of self efficacy and  on diet for CHF. Last BNP reviewed- and noted below   Recent Labs   Lab 03/02/25 2104   BNP 1,335*     -Received 100mg IV lasix x1, will monitor UO. Can continue IV lasix bid if pt has UO.  -Will add back metoprolol 25mg qd.  -ECHO 3/3:    Left Ventricle: The left ventricle is normal in size. Ventricular mass is normal. Normal wall thickness. There is concentric remodeling. There is normal systolic function with a visually estimated ejection fraction of 55%. Quantitated ejection fraction is 50%. There is diastolic dysfunction. Elevated left ventricular filling pressure.    Left Ventricle: Regional wall motion abnormalities present.    Right Ventricle: The right ventricle is normal in size. Systolic function is borderline low.    Left Atrium: severely dilated    Aortic Valve: There is moderate aortic valve sclerosis. Severely restricted motion. There is severe stenosis. Aortic valve area by VTI is 0.7 cm2. Aortic valve peak velocity is 4.2 m/s. Mean gradient is 40 mmHg. The dimensionless index is 0.23.    Mitral Valve: There is mild anterior leaflet sclerosis. There is severe posterior mitral annular calcification present. There is mild to moderate stenosis. The mean pressure gradient across the mitral valve is 6 mmHg at a heart rate of 89 bpm. There is mild to moderate MITRAL regurgitation. MVA 1.9cm2 by  planimetry    Tricuspid Valve: There is moderate regurgitation.    Pulmonary Artery: The estimated pulmonary artery systolic pressure is 73 mmHg.    IVC/SVC: Elevated venous pressure at 15 mmHg.    Pericardium: There is a moderate circumferential effusion. Bilateral pleural effusions.    <30% respiratory variation across mitral valve, no diastolic RV collapse, however effusion moderate and new, would continue to monitor closely.    3/4- Volume removal per HD. On lasix 80 iv. Hydralzine, imdur, metoprolol.     Acute kidney injury superimposed on stage 3b chronic kidney disease  KYA is likely due to  cardiorenal . Renal failure due to cardiogenic shock from complete heart block w heart cath on 2/19. Last HD session 3/1/25. She was admitted overnight for SOB and chest tightness since discharge and leg pain attributed to swelling. Cr stable overnight from 2.1 to 1.9. Anuric since starting HD.       Baseline creatinine is unknown. Most recent creatinine and eGFR are listed below.  Recent Labs     03/03/25  0520 03/04/25  0631 03/05/25  0339   CREATININE 1.9* 2.0* 1.5*   EGFRNORACEVR 27.4* 25.7* 36.3*        Plan  - KYA is improving  - Avoid nephrotoxins and renally dose meds for GFR listed above  - Monitor urine output, serial BMP, and adjust therapy as needed    3/4-  HD today, pt reported urinations    Type 2 diabetes mellitus with stage 3b chronic kidney disease, with long-term current use of insulin  Patient's FSGs are controlled on current hypoglycemics.   Last A1c reviewed-   Lab Results   Component Value Date    HGBA1C 6.6 (H) 02/27/2025     Most recent fingerstick glucose reviewed-   Recent Labs   Lab 03/04/25  0802 03/04/25  1618 03/04/25  2124   POCTGLUCOSE 101 184* 171*       Current correctional scale  Low  Maintain anti-hyperglycemic dose as follows-   Antihyperglycemics (From admission, onward)      Start     Stop Route Frequency Ordered    03/03/25 2100  insulin glargine U-100 (Lantus) pen 5 Units          -- SubQ Nightly 03/03/25 0233    03/03/25 0227  insulin aspart U-100 pen 0-5 Units         -- SubQ Before meals & nightly PRN 03/03/25 0128        -Accuchecks AC/HS  -Diabetic/renal diet    CHB (complete heart block)  PPM, on metoprolol      Pulmonary hypertension -- echo 11/2019  ECHO 2/25-he estimated pulmonary artery systolic pressure is 72 mmHg.      Follicular lymphoma  S/p chemo in 2010. In remission.     Cardiac pacemaker in situ  CHB (complete heart block)  -s/p ppm on 02/17, taking keflex tid (EOT 3/3)  -no acute issue  -cardiac monitoring    Coronary artery disease of native artery of native heart with stable angina pectoris  Patient with known CAD s/p stent placement and CABG, which is controlled Will continue  brilinta, ASA, and Statin and monitor for S/Sx of angina/ACS. Continue to monitor on telemetry.   02/19   -S/p heart catheterization on 02/19, [facility] with patent stents and moderate LAD/RCA disease - no culprit identified     Anxiety  -Chronic issue.  -PRN hydroxyzine.    Primary hypertension  Patient's blood pressure range in the last 24 hours was: BP  Min: 103/57  Max: 162/67.The patient's inpatient anti-hypertensive regimen is listed below:  Current Antihypertensives  isosorbide mononitrate 24 hr tablet 60 mg, Daily, Oral  metoprolol succinate (TOPROL-XL) 24 hr tablet 25 mg, Daily, Oral  hydrALAZINE tablet 50 mg, Every 8 hours, Oral  furosemide tablet 80 mg, 2 times daily, Oral    Plan  - BP is controlled, no changes needed to their regimen    3/4- /58   Pulse 98. HD planned today.     Peripheral vascular disease in diabetes mellitus -- CLEMENT 05/2019  -Chronic issue.  -Continue ASA and statin.    Mixed hyperlipidemia   Patient is chronically on statin.will continue for now. Monitor clinically. Last LDL was   Lab Results   Component Value Date    LDLCALC 105.6 01/14/2025        Aortic stenosis  Echocardiogram with evidence of aortic stenosis that is severe. The patient's most recent  echocardiogram result is listed below. We will manage the valvular abnormality by GDMT.    Echo  Result Date: 3/3/2025    Left Ventricle: The left ventricle is normal in size. Ventricular mass   is normal. Normal wall thickness. There is concentric remodeling. There is   normal systolic function with a visually estimated ejection fraction of   55%. Quantitated ejection fraction is 50%. There is diastolic dysfunction.   Elevated left ventricular filling pressure.    Left Ventricle: Regional wall motion abnormalities present.    Right Ventricle: The right ventricle is normal in size. Systolic   function is borderline low.    Left Atrium: severely dilated    Aortic Valve: There is moderate aortic valve sclerosis. Severely   restricted motion. There is severe stenosis. Aortic valve area by VTI is   0.7 cm2. Aortic valve peak velocity is 4.2 m/s. Mean gradient is 40 mmHg.   The dimensionless index is 0.23.    Mitral Valve: There is mild anterior leaflet sclerosis. There is severe   posterior mitral annular calcification present. There is mild to moderate   stenosis. The mean pressure gradient across the mitral valve is 6 mmHg at   a heart rate of 89 bpm. There is mild to moderate regurgitation. MVA   1.9cm2 by planimetry    Tricuspid Valve: There is moderate regurgitation.    Pulmonary Artery: The estimated pulmonary artery systolic pressure is   73 mmHg.    IVC/SVC: Elevated venous pressure at 15 mmHg.    Pericardium: There is a moderate circumferential effusion. Bilateral   pleural effusions.    <30% respiratory variation across mitral valve, no diastolic RV   collapse, however effusion moderate and new, would continue to monitor   closely.        Echo Saline Bubble? No  Result Date: 2/19/2025    Left Ventricle: The left ventricle is normal in size. There is mild   concentric hypertrophy. Septal motion is consistent with pacing. There is   normal systolic function with a visually estimated ejection fraction of 60   -  65%. Grade II diastolic dysfunction.    Right Ventricle: Mild right ventricular enlargement. Systolic function   is normal.    Left Atrium: Left atrium is moderately dilated.    Right Atrium: Right atrium is mildly dilated.    Aortic Valve: There is moderate stenosis. Aortic valve area by VTI is   1.0 cm². Aortic valve peak velocity is 3.2 m/s. Mean gradient is 24 mmHg.   The dimensionless index is 0.33.    Mitral Valve: There is mild stenosis. The mean pressure gradient across   the mitral valve is 12 mmHg at a heart rate of  bpm. There is mild   regurgitation.    Tricuspid Valve: There is moderate regurgitation.    Pulmonary Artery: The estimated pulmonary artery systolic pressure is   72 mmHg.    IVC/SVC: Intermediate venous pressure at 8 mmHg.    Pericardium: There is a trivial posterior effusion.        Echo  Result Date: 2/15/2025    Left Ventricle: The left ventricle is normal in size. There is moderate   concentric hypertrophy. There is hyperdynamic systolic function with a   visually estimated ejection fraction of 70 - 75%.    Right Ventricle: Normal right ventricular cavity size. Systolic   function is normal.    Left Atrium: Left atrium is moderately dilated.    Right Atrium: Right atrium is mildly dilated.    Aortic Valve: There is moderate stenosis. Aortic valve area by VTI is   0.8 cm². Aortic valve peak velocity is 3.4 m/s. Mean gradient is 27 mmHg.   The dimensionless index is 0.27.    Mitral Valve: There is mild stenosis. The mean pressure gradient across   the mitral valve is 5 mmHg at a heart rate of 42  bpm. There is mild   regurgitation.    Tricuspid Valve: There is moderate regurgitation.    Pulmonary Artery: The estimated pulmonary artery systolic pressure is   49 mmHg.    IVC/SVC: Intermediate venous pressure at 8 mmHg.        Echo  Result Date: 10/31/2024    Left Ventricle: The left ventricle is normal in size. Normal wall   thickness. Normal wall motion. There is normal systolic function  with a   visually estimated ejection fraction of 60 - 65%. Grade II diastolic   dysfunction. Elevated left ventricular filling pressure.    Right Ventricle: Normal right ventricular cavity size. Wall thickness   is normal. Systolic function is normal.    Left Atrium: Left atrium is severely dilated.    Aortic Valve: There is moderate aortic valve sclerosis. Moderately   restricted motion. There is moderate stenosis. Aortic valve area by VTI is   1.1 cm2. Aortic valve peak velocity is 3.1 m/s. Mean gradient is 20.1   mmHg. The dimensionless index is 0.37.    Mitral Valve: There is mild regurgitation.    Tricuspid Valve: There is mild regurgitation.    Pulmonary Artery: There is pulmonary hypertension. The estimated   pulmonary artery systolic pressure is 46 mmHg.    IVC/SVC: Normal venous pressure at 3 mmHg.           Anemia  Anemia is likely due to chronic disease due to Chronic Kidney Disease. Most recent hemoglobin and hematocrit are listed below.  Recent Labs     03/03/25  0404 03/04/25  0631 03/05/25  0339   HGB 7.5* 7.5* 7.9*   HCT 24.4* 24.7* 26.5*       Plan  - Monitor serial CBC: Daily  - Transfuse PRBC if patient becomes hemodynamically unstable, symptomatic or H/H drops below 7/21.  - Patient has not received any PRBC transfusions to date  - Patient's anemia is currently stable    Constipation  Pt reports issues with constipation since recent discharge and has been taking colace without success.  -Will start bowel regimen.    GERD (gastroesophageal reflux disease)  -Chronic, stable.  -Continue PPI.      VTE Risk Mitigation (From admission, onward)           Ordered     heparin (porcine) injection 1,000 Units  As needed (PRN)         03/04/25 1302     heparin (porcine) injection 5,000 Units  Every 8 hours         03/03/25 0128     IP VTE HIGH RISK PATIENT  Once         03/03/25 0128     Place sequential compression device  Until discontinued         03/03/25 0128                    Discharge Planning   MIKHAIL:  3/7/2025     Code Status: Full Code   Medical Readiness for Discharge Date:   Discharge Plan A: Home with family, Home Health            Kari Smith MD  Department of Hospital Medicine   [facility] S[facility]

## 2025-03-05 NOTE — NURSING
Patient c/o face arms to the finger tips legs all the way down to her feet is numb. No SOB she is asking to see a Dr. she is known to have panic attacks since recent heart issues - on call MD notified(MAXIMUS)

## 2025-03-06 LAB
ALBUMIN SERPL BCP-MCNC: 2.5 G/DL (ref 3.5–5.2)
ANION GAP SERPL CALC-SCNC: 7 MMOL/L (ref 8–16)
BUN SERPL-MCNC: 12 MG/DL (ref 8–23)
CALCIUM SERPL-MCNC: 8.2 MG/DL (ref 8.7–10.5)
CHLORIDE SERPL-SCNC: 106 MMOL/L (ref 95–110)
CO2 SERPL-SCNC: 25 MMOL/L (ref 23–29)
CREAT SERPL-MCNC: 1.5 MG/DL (ref 0.5–1.4)
EST. GFR  (NO RACE VARIABLE): 36.3 ML/MIN/1.73 M^2
GLUCOSE SERPL-MCNC: 173 MG/DL (ref 70–110)
PHOSPHATE SERPL-MCNC: 2.7 MG/DL (ref 2.7–4.5)
POCT GLUCOSE: 104 MG/DL (ref 70–110)
POCT GLUCOSE: 129 MG/DL (ref 70–110)
POCT GLUCOSE: 151 MG/DL (ref 70–110)
POTASSIUM SERPL-SCNC: 3.6 MMOL/L (ref 3.5–5.1)
SODIUM SERPL-SCNC: 138 MMOL/L (ref 136–145)

## 2025-03-06 PROCEDURE — 63600175 PHARM REV CODE 636 W HCPCS: Mod: HCNC | Performed by: NURSE PRACTITIONER

## 2025-03-06 PROCEDURE — 21400001 HC TELEMETRY ROOM: Mod: HCNC

## 2025-03-06 PROCEDURE — 99233 SBSQ HOSP IP/OBS HIGH 50: CPT | Mod: HCNC,GC,, | Performed by: STUDENT IN AN ORGANIZED HEALTH CARE EDUCATION/TRAINING PROGRAM

## 2025-03-06 PROCEDURE — 25000003 PHARM REV CODE 250: Mod: HCNC | Performed by: HOSPITALIST

## 2025-03-06 PROCEDURE — 80100016 HC MAINTENANCE HEMODIALYSIS: Mod: HCNC

## 2025-03-06 PROCEDURE — 63600175 PHARM REV CODE 636 W HCPCS: Mod: HCNC | Performed by: INTERNAL MEDICINE

## 2025-03-06 PROCEDURE — 25000242 PHARM REV CODE 250 ALT 637 W/ HCPCS: Mod: HCNC | Performed by: NURSE PRACTITIONER

## 2025-03-06 PROCEDURE — 94761 N-INVAS EAR/PLS OXIMETRY MLT: CPT | Mod: HCNC

## 2025-03-06 PROCEDURE — 27000221 HC OXYGEN, UP TO 24 HOURS: Mod: HCNC

## 2025-03-06 PROCEDURE — 25000003 PHARM REV CODE 250: Mod: HCNC | Performed by: NURSE PRACTITIONER

## 2025-03-06 PROCEDURE — 80069 RENAL FUNCTION PANEL: CPT | Mod: HCNC | Performed by: HOSPITALIST

## 2025-03-06 RX ORDER — SODIUM CHLORIDE 9 MG/ML
INJECTION, SOLUTION INTRAVENOUS
Status: CANCELLED | OUTPATIENT
Start: 2025-03-06

## 2025-03-06 RX ORDER — BUMETANIDE 1 MG/1
4 TABLET ORAL 2 TIMES DAILY
Status: DISCONTINUED | OUTPATIENT
Start: 2025-03-06 | End: 2025-03-10

## 2025-03-06 RX ORDER — LOPERAMIDE HYDROCHLORIDE 2 MG/1
2 CAPSULE ORAL 4 TIMES DAILY PRN
Status: DISCONTINUED | OUTPATIENT
Start: 2025-03-06 | End: 2025-03-12 | Stop reason: HOSPADM

## 2025-03-06 RX ORDER — SODIUM CHLORIDE 9 MG/ML
INJECTION, SOLUTION INTRAVENOUS ONCE
Status: CANCELLED | OUTPATIENT
Start: 2025-03-06 | End: 2025-03-06

## 2025-03-06 RX ADMIN — TICAGRELOR 60 MG: 60 TABLET ORAL at 09:03

## 2025-03-06 RX ADMIN — HYDROXYZINE HYDROCHLORIDE 25 MG: 25 TABLET ORAL at 09:03

## 2025-03-06 RX ADMIN — MUPIROCIN: 20 OINTMENT TOPICAL at 08:03

## 2025-03-06 RX ADMIN — LOPERAMIDE HYDROCHLORIDE 2 MG: 2 CAPSULE ORAL at 12:03

## 2025-03-06 RX ADMIN — ASPIRIN 81 MG CHEWABLE TABLET 81 MG: 81 TABLET CHEWABLE at 09:03

## 2025-03-06 RX ADMIN — ATORVASTATIN CALCIUM 80 MG: 40 TABLET, FILM COATED ORAL at 08:03

## 2025-03-06 RX ADMIN — HEPARIN SODIUM 5000 UNITS: 5000 INJECTION INTRAVENOUS; SUBCUTANEOUS at 08:03

## 2025-03-06 RX ADMIN — TICAGRELOR 60 MG: 60 TABLET ORAL at 08:03

## 2025-03-06 RX ADMIN — PANTOPRAZOLE SODIUM 40 MG: 40 TABLET, DELAYED RELEASE ORAL at 09:03

## 2025-03-06 RX ADMIN — FLUOXETINE HYDROCHLORIDE 40 MG: 20 CAPSULE ORAL at 09:03

## 2025-03-06 RX ADMIN — ACETAMINOPHEN 650 MG: 325 TABLET ORAL at 02:03

## 2025-03-06 RX ADMIN — HEPARIN SODIUM 5000 UNITS: 5000 INJECTION INTRAVENOUS; SUBCUTANEOUS at 06:03

## 2025-03-06 RX ADMIN — METOPROLOL SUCCINATE 25 MG: 25 TABLET, EXTENDED RELEASE ORAL at 09:03

## 2025-03-06 RX ADMIN — MUPIROCIN: 20 OINTMENT TOPICAL at 09:03

## 2025-03-06 RX ADMIN — HEPARIN SODIUM 1000 UNITS: 1000 INJECTION, SOLUTION INTRAVENOUS; SUBCUTANEOUS at 05:03

## 2025-03-06 RX ADMIN — HYDRALAZINE HYDROCHLORIDE 50 MG: 25 TABLET ORAL at 08:03

## 2025-03-06 RX ADMIN — HYDRALAZINE HYDROCHLORIDE 50 MG: 25 TABLET ORAL at 06:03

## 2025-03-06 RX ADMIN — HYDROXYZINE HYDROCHLORIDE 25 MG: 25 TABLET ORAL at 10:03

## 2025-03-06 RX ADMIN — LOPERAMIDE HYDROCHLORIDE 2 MG: 2 CAPSULE ORAL at 09:03

## 2025-03-06 RX ADMIN — HEPARIN SODIUM 5000 UNITS: 5000 INJECTION INTRAVENOUS; SUBCUTANEOUS at 01:03

## 2025-03-06 RX ADMIN — LORAZEPAM 1 MG: 1 TABLET ORAL at 09:03

## 2025-03-06 RX ADMIN — INSULIN GLARGINE 5 UNITS: 100 INJECTION, SOLUTION SUBCUTANEOUS at 08:03

## 2025-03-06 RX ADMIN — ISOSORBIDE MONONITRATE 60 MG: 60 TABLET, EXTENDED RELEASE ORAL at 09:03

## 2025-03-06 RX ADMIN — BUMETANIDE 4 MG: 1 TABLET ORAL at 09:03

## 2025-03-06 RX ADMIN — THERA TABS 1 TABLET: TAB at 09:03

## 2025-03-06 RX ADMIN — OXYCODONE HYDROCHLORIDE 10 MG: 10 TABLET ORAL at 09:03

## 2025-03-06 NOTE — PLAN OF CARE
Problem: Adult Inpatient Plan of Care  Goal: Plan of Care Review  Outcome: Progressing  Goal: Patient-Specific Goal (Individualized)  Outcome: Progressing  Goal: Absence of Hospital-Acquired Illness or Injury  Outcome: Progressing  Goal: Optimal Comfort and Wellbeing  Outcome: Progressing  Goal: Readiness for Transition of Care  Outcome: Progressing     Problem: Skin Injury Risk Increased  Goal: Skin Health and Integrity  Outcome: Progressing     Problem: Infection  Goal: Absence of Infection Signs and Symptoms  Outcome: Progressing     Problem: Hemodialysis  Goal: Safe, Effective Therapy Delivery  Outcome: Progressing  Goal: Effective Tissue Perfusion  Outcome: Progressing  Goal: Absence of Infection Signs and Symptoms  Outcome: Progressing     Problem: Diabetes Comorbidity  Goal: Blood Glucose Level Within Targeted Range  Outcome: Progressing     Problem: Acute Kidney Injury/Impairment  Goal: Fluid and Electrolyte Balance  Outcome: Progressing  Goal: Improved Oral Intake  Outcome: Progressing  Goal: Effective Renal Function  Outcome: Progressing     Problem: Wound  Goal: Optimal Coping  Outcome: Progressing  Goal: Optimal Functional Ability  Outcome: Progressing  Goal: Absence of Infection Signs and Symptoms  Outcome: Progressing  Goal: Improved Oral Intake  Outcome: Progressing  Goal: Optimal Pain Control and Function  Outcome: Progressing  Goal: Skin Health and Integrity  Outcome: Progressing  Goal: Optimal Wound Healing  Outcome: Progressing     Problem: Fall Injury Risk  Goal: Absence of Fall and Fall-Related Injury  Outcome: Progressing     Problem: Chronic Kidney Disease  Goal: Electrolyte Balance  Outcome: Progressing  Goal: Fluid Balance  Outcome: Progressing      97.9

## 2025-03-06 NOTE — PLAN OF CARE
[facility] - Med Surg  Discharge Reassessment    Primary Care Provider: Jorge Paris MD    Expected Discharge Date: 3/7/2025    SW assigned to pt's care team.  SW met with pt to discuss discharge recommendations.  Pt has DME for ambulation at home.  [facility]  Pt also current with [facility].  Pt currently on O2 and will likely need O2 for home.  Pt also agreeable to [facility].  Pt's family will provide transportation home.      Discharge Plan A and Plan B have been determined by review of patient's clinical status, future medical and therapeutic needs, and coverage/benefits for post-acute care in coordination with multidisciplinary team members.    Reassessment (most recent)       Discharge Reassessment - 03/06/25 1359          Discharge Reassessment    Assessment Type Discharge Planning Reassessment     Did the patient's condition or plan change since previous assessment? No     Discharge Plan discussed with: Patient     Communicated MIKHAIL with patient/caregiver Yes     Discharge Plan A Home;Home with family;Home Health     Discharge Plan B Home;Home with family     DME Needed Upon Discharge  oxygen     Transition of Care Barriers None     Why the patient remains in the hospital Requires continued medical care        Post-Acute Status    Post-Acute Authorization Home Health     Home Health Status Referrals Sent     Discharge Delays None known at this time                   Uzma Prieto LMSW  Part-Time-  [facility]  Ext. [phone]

## 2025-03-06 NOTE — SUBJECTIVE & OBJECTIVE
Objective:     Vital Signs (Most Recent):  Temp: 97.7 °F (36.5 °C) (03/06/25 0723)  Pulse: 98 (03/06/25 1056)  Resp: 18 (03/06/25 0011)  BP: (!) 161/71 (03/06/25 0723)  SpO2: 97 % (03/06/25 0723) Vital Signs (24h Range):  Temp:  [96.9 °F (36.1 °C)-98 °F (36.7 °C)] 97.7 °F (36.5 °C)  Pulse:  [] 98  Resp:  [18] 18  SpO2:  [95 %-100 %] 97 %  BP: (118-171)/(54-75) 161/71     Weight: 85.7 kg (189 lb) (03/03/25 0700)  Body mass index is 27.91 kg/m².  Body surface area is 2.04 meters squared.    I/O last 3 completed shifts:  In: -   Out: 3651 [Other:3650; Stool:1]     Physical Exam  Cardiovascular:      Rate and Rhythm: Regular rhythm.      Heart sounds: Normal heart sounds.   Pulmonary:      Breath sounds: Rales present.   Musculoskeletal:         General: Swelling present.   Neurological:      Mental Status: She is alert. Mental status is at baseline.   Psychiatric:         Mood and Affect: Mood normal.          Significant Labs:  CBC:   Recent Labs   Lab 03/05/25 0339   WBC 16.29*   RBC 2.85*   HGB 7.9*   HCT 26.5*      MCV 93   MCH 27.7   MCHC 29.8*     CMP:   Recent Labs   Lab 03/05/25 0339 03/05/25 2017    170*   CALCIUM 8.0* 8.4*   ALBUMIN 2.4*  --    PROT 6.0  --    * 135*   K 3.4* 3.8   CO2 22* 23    104   BUN 14 7*   CREATININE 1.5* 1.1   ALKPHOS 123  --    ALT 10  --    AST 25  --    BILITOT 0.4  --      All labs within the past 24 hours have been reviewed.

## 2025-03-06 NOTE — PLAN OF CARE
Problem: Adult Inpatient Plan of Care  Goal: Plan of Care Review  Outcome: Progressing  Goal: Patient-Specific Goal (Individualized)  Outcome: Progressing  Goal: Absence of Hospital-Acquired Illness or Injury  Outcome: Progressing  Goal: Optimal Comfort and Wellbeing  Outcome: Progressing  Goal: Readiness for Transition of Care  Outcome: Progressing     Problem: Skin Injury Risk Increased  Goal: Skin Health and Integrity  Outcome: Progressing     Problem: Infection  Goal: Absence of Infection Signs and Symptoms  Outcome: Progressing     Problem: Hemodialysis  Goal: Safe, Effective Therapy Delivery  Outcome: Progressing  Goal: Effective Tissue Perfusion  Outcome: Progressing  Goal: Absence of Infection Signs and Symptoms  Outcome: Progressing     Problem: Diabetes Comorbidity  Goal: Blood Glucose Level Within Targeted Range  Outcome: Progressing     Problem: Acute Kidney Injury/Impairment  Goal: Fluid and Electrolyte Balance  Outcome: Progressing  Goal: Improved Oral Intake  Outcome: Progressing  Goal: Effective Renal Function  Outcome: Progressing     Problem: Wound  Goal: Optimal Coping  Outcome: Progressing  Goal: Optimal Functional Ability  Outcome: Progressing  Goal: Absence of Infection Signs and Symptoms  Outcome: Progressing  Goal: Improved Oral Intake  Outcome: Progressing  Goal: Optimal Pain Control and Function  Outcome: Progressing  Goal: Skin Health and Integrity  Outcome: Progressing  Goal: Optimal Wound Healing  Outcome: Progressing     Problem: Fall Injury Risk  Goal: Absence of Fall and Fall-Related Injury  Outcome: Progressing     Problem: Chronic Kidney Disease  Goal: Electrolyte Balance  Outcome: Progressing  Goal: Fluid Balance  Outcome: Progressing

## 2025-03-06 NOTE — PHYSICIAN QUERY
Due to the conflicting clinical picture, please clarify the hypoxic respiratory failure diagnosis.  Clinically undetermined

## 2025-03-06 NOTE — ASSESSMENT & PLAN NOTE
Volume removal with dialysis. Trying furosemide. Change to bumetanide. Monitor intake/output, electrolytes.

## 2025-03-06 NOTE — SUBJECTIVE & OBJECTIVE
Interval History: Furosemide 80 mg PO ineffective.    Review of Systems   Constitutional:  Negative for chills and fever.   Respiratory:  Positive for shortness of breath.    Neurological:  Negative for seizures and syncope.     Objective:     Vital Signs (Most Recent):  Temp: 97.7 °F (36.5 °C) (03/06/25 0723)  Pulse: 106 (03/06/25 0723)  Resp: 18 (03/06/25 0011)  BP: (!) 161/71 (03/06/25 0723)  SpO2: 97 % (03/06/25 0723) Vital Signs (24h Range):  Temp:  [96.9 °F (36.1 °C)-98.2 °F (36.8 °C)] 97.7 °F (36.5 °C)  Pulse:  [] 106  Resp:  [18] 18  SpO2:  [95 %-100 %] 97 %  BP: (118-171)/(54-75) 161/71     Weight: 85.7 kg (189 lb)  Body mass index is 27.91 kg/m².    Intake/Output Summary (Last 24 hours) at 3/6/2025 0749  Last data filed at 3/5/2025 1657  Gross per 24 hour   Intake --   Output 3651 ml   Net -3651 ml         Physical Exam  Vitals and nursing note reviewed.   Constitutional:       General: She is not in acute distress.     Appearance: She is well-developed. She is not diaphoretic.      Interventions: She is not intubated.Nasal cannula in place.   Pulmonary:      Effort: Pulmonary effort is normal. No accessory muscle usage or respiratory distress. She is not intubated.   Musculoskeletal:      Right lower leg: Edema present.      Left lower leg: Edema present.   Neurological:      Mental Status: She is alert and oriented to person, place, and time. Mental status is at baseline.      Motor: No seizure activity.   Psychiatric:         Behavior: Behavior is not agitated, aggressive or hyperactive. Behavior is cooperative.               Significant Labs: All pertinent labs within the past 24 hours have been reviewed.    Significant Imaging: I have reviewed all pertinent imaging results/findings within the past 24 hours.  US Abdomen Limited 3/2/25: FINDINGS:   Liver: Mildly enlarged measuring 18.4 cm. Homogeneous echotexture. Unchanged calcification in the left hepatic lobe measuring 0.7 x 3.1 cm, previously  0.6 x 2.7 cm.   Gallbladder: Several mobile calcified gallstones, the largest measuring 7 mm, and sludge.  No wall thickening, mural hyperemia, or pericholecystic fluid.  No sonographic Christian's sign.  Gallbladder is distended.   Biliary system: The common duct is not dilated for age, measuring 6 mm.  No intrahepatic ductal dilatation.   Spleen: Upper limit of normal in size with a homogeneous echotexture, measuring 12.2 x 5.0 cm.   Pancreas: The visualized portions of pancreas appear normal.   Miscellaneous: No ascites.  Bilateral pleural effusion.  Limited evaluation of the right kidney is negative for hydronephrosis.   Impression:  1. Cholelithiasis and gallbladder sludge without evidence of cholecystitis.  Distended gallbladder.   2. Hepatomegaly.   3. Bilateral pleural effusion.   X-Ray Chest AP Portable 3/3/25: FINDINGS:   Cardiac wires overlie the chest.  Cardiomediastinal silhouette is enlarged and similar to the prior study.  Right-sided central venous catheter overlies the SVC.  Cardiac pacing device is again present.  Atherosclerotic calcifications overlie the aortic arch.  Central vascular congestion with bilateral interstitial opacities, likely pulmonary edema.  Small to moderate bilateral pleural effusions.  Passive atelectasis or airspace disease in the lung bases.  Upper lungs are relatively clear.  No distinct pneumothorax.   Impression:  Cardiomegaly with pulmonary edema and bilateral pleural effusions.  Aeration is similar to mildly worse when compared with 02/19/2025.   New right-sided central venous catheter overlying the SVC.   US Retroperitoneal Complete 3/3/25: FINDINGS:   Right kidney: The right kidney measures 10.7 cm. No cortical thinning. No loss of corticomedullary distinction.  Diminished perfusion.  Resistive index measures 1.0.  No mass. Few echogenic foci with posterior shadowing measuring up to 0.5 cm.  No hydronephrosis.   Left kidney: The left kidney measures 11.0 cm. No cortical  thinning. No loss of corticomedullary distinction.  Diminished perfusion.  Resistive index measures 1.0.  No mass.  Few echogenic foci with posterior shadowing measuring up to 0.3 cm.   No hydronephrosis.   Splenic resistive index measures 0.68.   The bladder is partially distended at the time of scanning and has an unremarkable appearance.   Left pleural effusion.   Impression:  Signs of medical renal disease bilaterally.  No hydronephrosis.   Suspected small nonobstructing renal stones bilaterally.   Left pleural effusion.   Transthoracic echo complete 3/3/25:     Left Ventricle: The left ventricle is normal in size. Ventricular mass is normal. Normal wall thickness. There is concentric remodeling. There is normal systolic function with a visually estimated ejection fraction of 55%. Quantitated ejection fraction is 50%. There is diastolic dysfunction. Elevated left ventricular filling pressure.    Left Ventricle: Regional wall motion abnormalities present.    Right Ventricle: The right ventricle is normal in size. Systolic function is borderline low.    Left Atrium: severely dilated    Aortic Valve: There is moderate aortic valve sclerosis. Severely restricted motion. There is severe stenosis. Aortic valve area by VTI is 0.7 cm2. Aortic valve peak velocity is 4.2 m/s. Mean gradient is 40 mmHg. The dimensionless index is 0.23.    Mitral Valve: There is mild anterior leaflet sclerosis. There is severe posterior mitral annular calcification present. There is mild to moderate stenosis. The mean pressure gradient across the mitral valve is 6 mmHg at a heart rate of 89 bpm. There is mild to moderate regurgitation. MVA 1.9cm2 by planimetry    Tricuspid Valve: There is moderate regurgitation.    Pulmonary Artery: The estimated pulmonary artery systolic pressure is 73 mmHg.    IVC/SVC: Elevated venous pressure at 15 mmHg.    Pericardium: There is a moderate circumferential effusion. Bilateral pleural effusions.    <30%  respiratory variation across mitral valve, no diastolic RV collapse, however effusion moderate and new, would continue to monitor closely.

## 2025-03-06 NOTE — ASSESSMENT & PLAN NOTE
Patient's blood pressure range in the last 24 hours was: BP  Min: 118/75  Max: 171/71.The patient's inpatient anti-hypertensive regimen is listed below:  Current Antihypertensives  isosorbide mononitrate 24 hr tablet 60 mg, Daily, Oral  metoprolol succinate (TOPROL-XL) 24 hr tablet 25 mg, Daily, Oral  hydrALAZINE tablet 50 mg, Every 8 hours, Oral  bumetanide tablet 4 mg, 2 times daily, Oral    Plan  - BP is controlled, no changes needed to their regimen    3/4- /58   Pulse 98. HD planned today.

## 2025-03-06 NOTE — ASSESSMENT & PLAN NOTE
Anemia is likely due to chronic disease due to Chronic Kidney Disease. Most recent hemoglobin and hematocrit are listed below.  Recent Labs     03/04/25  0631 03/05/25  0339   HGB 7.5* 7.9*   HCT 24.7* 26.5*     Plan  - Monitor serial CBC: Daily  - Transfuse PRBC if patient becomes hemodynamically unstable, symptomatic or H/H drops below 7/21.  - Patient has not received any PRBC transfusions to date  - Patient's anemia is currently stable

## 2025-03-06 NOTE — ASSESSMENT & PLAN NOTE
On hemodialysis since 2/22/2025. Anuric since starting but was able to urinate with furosemide.    Baseline creatinine is unknown. Most recent creatinine and eGFR are listed below.  Recent Labs     03/04/25  0631 03/05/25  0339 03/05/25 2017   CREATININE 2.0* 1.5* 1.1   EGFRNORACEVR 25.7* 36.3* 52.7*      Plan  - Dialysis per Nephrology.

## 2025-03-06 NOTE — PROGRESS NOTES
[facility] Surg  Nephrology  Progress Note    Patient Name: Lorena Contreras  MRN: [id number]  Admission Date: 3/2/2025  Hospital Length of Stay: 3 days  Attending Provider: Jairon Morales MD   Primary Care Physician: Jorge Paris MD  Principal Problem:Hypervolemia associated with renal insufficiency    Subjective:     HPI: Lorena is a pleasant 73 yo woman w pmhx significant for DM2, fibromyalgia, lymphoma s/p chemo, HTN HLD CVA MI CAD PCI HFpEF anemia, KYA on CKD3b now dialysis dependent 2/22/25 after recent hospitalization at [facility] cardiogenic shock from complete heart block w heart cath on 2/19. Last HD session 3/1/25. She was admitted overnight for SOB and chest tightness since discharge and leg pain attributed to swelling. Cr stable overnight from 2.1 to 1.9. No IO recorded. Anuric since starting HD. CXR showed pulmonary edema and bilateral pleural effusions. She was given 100 mg IV lasix in ED.       Objective:     Vital Signs (Most Recent):  Temp: 97.7 °F (36.5 °C) (03/06/25 0723)  Pulse: 98 (03/06/25 1056)  Resp: 18 (03/06/25 0011)  BP: (!) 161/71 (03/06/25 0723)  SpO2: 97 % (03/06/25 0723) Vital Signs (24h Range):  Temp:  [96.9 °F (36.1 °C)-98 °F (36.7 °C)] 97.7 °F (36.5 °C)  Pulse:  [] 98  Resp:  [18] 18  SpO2:  [95 %-100 %] 97 %  BP: (118-171)/(54-75) 161/71     Weight: 85.7 kg (189 lb) (03/03/25 0700)  Body mass index is 27.91 kg/m².  Body surface area is 2.04 meters squared.    I/O last 3 completed shifts:  In: -   Out: 3651 [Other:3650; Stool:1]     Physical Exam  Cardiovascular:      Rate and Rhythm: Regular rhythm.      Heart sounds: Normal heart sounds.   Pulmonary:      Breath sounds: Rales present.   Musculoskeletal:         General: Swelling present.   Neurological:      Mental Status: She is alert. Mental status is at baseline.   Psychiatric:         Mood and Affect: Mood normal.          Significant Labs:  CBC:   Recent Labs   Lab 03/05/25  0339   WBC 16.29*  In an effort to ensure that our patients [employer], a Team Member has reviewed your chart and identified an opportunity to provide the best care possible. An attempt was made to discuss or schedule due or overdue Preventive or Chronic Condition care.    The Outcome was Contact was not made, letter/portal message sent.  We are attempting to schedule a follow up office visit. If you have any questions or need help with scheduling, contact your primary care provider.. Care Gaps identified: Colorectal Cancer Screening.    Appointment needed: Follow-up Visit       RBC 2.85*   HGB 7.9*   HCT 26.5*      MCV 93   MCH 27.7   MCHC 29.8*     CMP:   Recent Labs   Lab 03/05/25  0339 03/05/25 2017    170*   CALCIUM 8.0* 8.4*   ALBUMIN 2.4*  --    PROT 6.0  --    * 135*   K 3.4* 3.8   CO2 22* 23    104   BUN 14 7*   CREATININE 1.5* 1.1   ALKPHOS 123  --    ALT 10  --    AST 25  --    BILITOT 0.4  --      All labs within the past 24 hours have been reviewed.  Assessment/Plan:   KYA on CKD3b secondary to hemodynamic instability from complete heart block w heart cath on 2/19, shortly after started on HD  DM2 - longstanding, difficult to control  Hypoxic resp failure  Fibromyalgia  HTN  Complete heart block s/p PPM  Pulmonary HTN  Secondary hyperparathyroidism of renal origin  Anemia in CKD     HD info: [facility], [facility] ? Kg, Dr Alex Castañeda. Last HD session PTA 3/1/25. Anuric. R tunnel catheter. MWF.      -On 2 LPM NC O2. Given continued SOB, plan for repeat HD today. Also rec palliative consult for assistance w medications for air hunger. Family was amenable to this plan.   -EPO w HD for anemia    Thank you for your consult. I will follow-up with patient. Please contact us if you have any additional questions.    Juaquin Capone MD  Nephrology  [facility] - Med Surg

## 2025-03-06 NOTE — ASSESSMENT & PLAN NOTE
Patient's FSGs are controlled on current hypoglycemics.   Last A1c reviewed-   Lab Results   Component Value Date    HGBA1C 6.6 (H) 02/27/2025     Most recent fingerstick glucose reviewed-   Recent Labs   Lab 03/05/25  0819 03/05/25  1132 03/05/25 2056 03/06/25  0724   POCTGLUCOSE 134* 177* 166* 129*     Current correctional scale  Low  Maintain anti-hyperglycemic dose as follows-   Antihyperglycemics (From admission, onward)      Start     Stop Route Frequency Ordered    03/03/25 2100  insulin glargine U-100 (Lantus) pen 5 Units         -- SubQ Nightly 03/03/25 0233    03/03/25 0227  insulin aspart U-100 pen 0-5 Units         -- SubQ Before meals & nightly PRN 03/03/25 0128        -Accuchecks AC/HS  -Diabetic/renal diet

## 2025-03-06 NOTE — PROGRESS NOTES
David Mijares - [facility] Surg  Wound Care    Patient Name:  Lorena Contreras   MRN:  [id number]  Date: 3/6/2025  Diagnosis: Hypervolemia associated with renal insufficiency    History:     Past Medical History:   Diagnosis Date    Age-related osteoporosis with current pathological fracture with routine healing 11/13/2024    Allergy     Altered mental status 06/19/2022    DYSARTHRIA, SPASTIC MOVEMENTS & DIFFICULTY SWALLOWING    Anemia     Anxiety     Arthritis     C. difficile colitis 09/29/2024    Currently treated with po vancomycin      Cataract     both removed    Colon polyps     Coronary artery disease     Depression     Diabetes mellitus, type II     Disorder of kidney and ureter     Epilepsia partialis continua 04/28/2023    Fibromyalgia     Follicular lymphoma     GERD (gastroesophageal reflux disease)     HTN (hypertension)     Hyperlipidemia     MI (myocardial infarction) 03/2019    Personal history of colonic polyps     Restless leg syndrome     Stroke        Social History[1]    Precautions:     Allergies as of 03/02/2025 - Reviewed 03/02/2025   Allergen Reaction Noted    Novolin 70/30 (semi-synthetic) Nausea And Vomiting 07/18/2016    Sulfa (sulfonamide antibiotics) Anaphylaxis 01/23/2014    Talwin [pentazocine lactate] Anaphylaxis 01/23/2014    Victoza [liraglutide] Nausea And Vomiting 07/30/2015    Glipizide Nausea Only 11/04/2016    Codeine  01/23/2014    Influenza virus vaccines Hives 06/01/2021    Iodine and iodide containing products Hives 10/25/2017    Levetiracetam Itching 04/28/2023    Lyrica [pregabalin] Hallucinations 12/30/2024    Neurontin [gabapentin]  08/20/2024    Rituxan [rituximab] Hives 01/23/2014    Zoloft [sertraline] Nausea And Vomiting 01/23/2014       [facility] Assessment Details/Treatment   Patient seen for wound care consultation.  Chart reviewed for this encounter.  See flow sheet for findings.    Pt in bed and agreeable to care. DTPI on the sacrum now appearing unstageable, present on  admission. The buttocks and perirectal are pink and moist - likely related to incontinence. Triad applied. The right chest incision site is dry with eschar/exudate. The left chest incision site is intact with pink scar tissue. Recommend chloraprep daily. The left breast skin fold is pink and moist - intertrigo. Triad applied. The bilateral heels are intact, pink, dry and blanchable. The right lateral foot is intact with darkened pigmentation. Bruise to the left alteral foot. Pt educated on the wound care and the importance of turning every 2 hours.     Recommendations:  - Sacrum, buttocks, perirectal, inner thighs and skin folds: bedside nursing to cleanse with bath wipes, pat dry and apply triad bid/prn; no dressings    - Right and left chest incision: cleanse with Cloraprep daily     - Turning every 2 hours  - Heel lift boots  - Immerse mattress     03/06/25 1057        Wound 02/17/25 0941 Incision Left Chest horizontal   Date First Assessed/Time First Assessed: 02/17/25 0941   Present on Original Admission: No  Primary Wound Type: Incision  Side: Left  Location: Chest  Incision Type: horizontal  Closure Method: Staples;Sutures  Additional Comments: telfa and tegaderm ...   Wound Image    Incision WDL WDL   Dressing Appearance Open to air   Drainage Amount None   Appearance Intact;Pink;Dry  (scar tissue)   Periwound Area Intact;Dry        Wound 02/24/25 2330 Pressure Injury Sacral spine   Date First Assessed/Time First Assessed: 02/24/25 2330   Present on Original Admission: No  Primary Wound Type: Pressure Injury  Location: Sacral spine   Wound Image    Pressure Injury Stage U   Dressing Appearance Open to air   Drainage Amount Scant   Appearance Pink;Moist;Yellow;Slough   Periwound Area Intact;Moist;Pink   Care Cleansed with:;Sterile normal saline   Dressing Applied;Other (comment)  (triad)   Periwound Care Moisture barrier applied  (triad)        Wound 02/21/25 1429 Incision Right Neck other (see comments)    Date First Assessed/Time First Assessed: 02/21/25 1429   Present on Original Admission: No  Primary Wound Type: Incision  Side: Right  Location: Neck  Incision Type: (c) other (see comments)  Closure Method: Sutures;Steri-strips;Dermabond   Wound Image    Incision WDL ex   Dressing Appearance Open to air   Drainage Amount None   Appearance Intact;Eschar;Red   Periwound Area Intact;Dry        Wound 02/24/25 2354 Intertrigo Left Breast   Date First Assessed/Time First Assessed: 02/24/25 2354   Primary Wound Type: Intertrigo  Side: Left  Location: Breast   Wound Image    Dressing Appearance Open to air   Drainage Amount Scant   Appearance Pink;Moist   Tissue loss description Partial thickness   Periwound Area Intact;Moist        Wound 03/06/25 1057 Contusion Left lateral Foot   Date First Assessed/Time First Assessed: 03/06/25 1057   Present on Original Admission: Yes  Primary Wound Type: Contusion  Side: Left  Orientation: lateral  Location: Foot   Wound Image    Dressing Appearance Open to air   Drainage Amount None   Appearance Intact;Dry;Purple   Periwound Area Intact;Dry     Right lateral foot - darkened pigmentation       Recommendations made to primary team for above plan via secure chat. Wound care will follow-up as needed.   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  2 dtr.  Lives with .  3 cats and a dog.  Retired.  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  Lives in house, 1 story and 4 steps up and has a ramp.      Enjoys crafting.  Unable to bowl due to myalgias.       Social Drivers of Health      Financial Resource Strain: Low Risk  (3/4/2025)    Overall Financial Resource Strain (CARDIA)     Difficulty of Paying Living Expenses: Not hard at all   Recent Concern: Financial Resource Strain - Medium Risk (2/26/2025)    Overall Financial Resource Strain (CARDIA)     Difficulty of Paying Living Expenses: Somewhat hard   Food Insecurity: No Food Insecurity (3/4/2025)    Hunger Vital Sign     Worried About Running Out of Food in the Last Year: Never true     Ran Out of Food in the Last Year: Never true   Transportation Needs: Unmet Transportation Needs (2/26/2025)    PRAPARE - Transportation     Lack of Transportation (Medical): Yes     Lack of Transportation (Non-Medical): No   Physical Activity: Inactive (3/4/2025)    Exercise Vital Sign     Days of Exercise per Week: 0 days     Minutes of Exercise per Session: 0 min   Stress: Stress Concern Present (3/4/2025)    [employer] - Occupational Stress Questionnaire     Feeling of Stress : To some extent   Housing Stability: Low Risk  (3/4/2025)    Housing Stability Vital Sign     Unable to Pay for Housing in the Last Year: No     Number of Times Moved in the Last Year: 0     Homeless in the Last Year: No

## 2025-03-06 NOTE — PROGRESS NOTES
[facility] Medicine  Progress Note    Patient Name: Lorena Contreras  MRN: [id number]  Patient Class: IP- Inpatient   Admission Date: 3/2/2025  Length of Stay: 3 days  Attending Physician: Jairon Morales MD  Primary Care Provider: Jorge Paris MD        Subjective     Principal Problem:Hypervolemia associated with renal insufficiency        HPI:  Lorena Contreras is a 74 year old white woman with hypertension, hyperlipidemia, diabetes mellitus type 2, coronary artery disease, history of stroke, complete heart block status post dual chamber cardiac pacemaker placed 2/17/2025, pulmonary hypertension, chronic diastolic heart failure, peripheral vascular disease, chronic kidney disease stage 3b (on hemodialysis since 2/22/2025), anemia, follicular lymphoma diagnosed in 2009 treated with chemotherapy, gastroesophageal reflux disease, constipation, restless legs syndrome, fibromyalgia, anxiety, depression, history of right elbow dislocation repair in 2015, history of incision and drainage of right foot on 6/2/2021, history of open reduction and internal fixation of right pilon fracture on 8/14/2024 and right acetabulum fracture on 8/16/2024, history of right medial ankle fasciocutaneous flap and excisional debridement of left ankle medial wound on 9/25/2024, history of tonsillectomy in 1955. She lives in [place], [place]. She is . Her primary care physician is Dr. Jorge Paris.    She presented to [facility] Emergency Department on 3/2/2025 with worsening shortness of breath and chest tightness since being discharged from [facility] on 2/25/2025, where she was admitted on 2/15/2025 for complete heart block, acute kidney injury, and non-ST elevation myocardial infarction. She had mild improvement of symptoms after hemodialysis lasting several hours. She had associated dyspnea on exertion, significant swelling of bilateral lower extremities and  abdomen, orthopnea, wheezing, fatigue, and generalized weakness. Chest tightness was non-radiating, substernal, and worse with musculoskeletal manipulation and deep breathing, but unaffected by exertion and positioning. She had diffuse leg pain due to swelling, generalized abdominal discomfort described as dull, nagging pain, and nausea and vomiting. She has been anuric since starting dialysis. She had not missed any dialysis treatments.   In the emergency department, she had tachypnea and mild tachycardia. She was placed on 2 liters/minute of supplemental oxygen for comfort. Labs showed stable anemia and elevated WBC (05302/uL), bicarbonate 20 mmol/L, creatinine 2.1 mg/dL (baseline prior to starting dialysis was around 2.0), glucose 194 mg/dL, calcium 8.6 mg/dL, albumin 3.1 g/dL, BNP 1335 pg/mL, high sensitivity troponin 64 ng/L (repeat 57). EKG showed sinus rhythm with bigeminy, rate 70 bpm, no ST elevation or depression. Chest X-ray showed cardiomegaly with pulmonary edema and bilateral pleural effusions with aeration similar to mildly worse than 2/19/2025. Abdominal ultrasound showed cholelithiasis and gallbladder sludge without evidence of cholecystitis, distended gallbladder, hepatomegaly, bilateral pleural effusions. She was given acetaminophen and 100 mg of IV furosemide. She was admitted to Hospital Medicine Team S.    Overview/Hospital Course:  Nephrology was consulted for dialysis. She urinated 300 mL after furosemide. She had not urinated since started dialysis. She was put on IV furosemide 80 mg twice daily. She takes oxycodone 10 mg at home and requested something stronger. Oxycodone was increased to her home dose. On 3/5/2025, furosemide was changed to 80 mg by mouth, which was ineffective.     Interval History: Furosemide 80 mg PO ineffective.    Review of Systems   Constitutional:  Negative for chills and fever.   Respiratory:  Positive for shortness of breath.    Neurological:  Negative for  seizures and syncope.     Objective:     Vital Signs (Most Recent):  Temp: 97.7 °F (36.5 °C) (03/06/25 0723)  Pulse: 106 (03/06/25 0723)  Resp: 18 (03/06/25 0011)  BP: (!) 161/71 (03/06/25 0723)  SpO2: 97 % (03/06/25 0723) Vital Signs (24h Range):  Temp:  [96.9 °F (36.1 °C)-98.2 °F (36.8 °C)] 97.7 °F (36.5 °C)  Pulse:  [] 106  Resp:  [18] 18  SpO2:  [95 %-100 %] 97 %  BP: (118-171)/(54-75) 161/71     Weight: 85.7 kg (189 lb)  Body mass index is 27.91 kg/m².    Intake/Output Summary (Last 24 hours) at 3/6/2025 0749  Last data filed at 3/5/2025 1657  Gross per 24 hour   Intake --   Output 3651 ml   Net -3651 ml         Physical Exam  Vitals and nursing note reviewed.   Constitutional:       General: She is not in acute distress.     Appearance: She is well-developed. She is not diaphoretic.      Interventions: She is not intubated.Nasal cannula in place.   Pulmonary:      Effort: Pulmonary effort is normal. No accessory muscle usage or respiratory distress. She is not intubated.   Musculoskeletal:      Right lower leg: Edema present.      Left lower leg: Edema present.   Neurological:      Mental Status: She is alert and oriented to person, place, and time. Mental status is at baseline.      Motor: No seizure activity.   Psychiatric:         Behavior: Behavior is not agitated, aggressive or hyperactive. Behavior is cooperative.               Significant Labs: All pertinent labs within the past 24 hours have been reviewed.    Significant Imaging: I have reviewed all pertinent imaging results/findings within the past 24 hours.  US Abdomen Limited 3/2/25: FINDINGS:   Liver: Mildly enlarged measuring 18.4 cm. Homogeneous echotexture. Unchanged calcification in the left hepatic lobe measuring 0.7 x 3.1 cm, previously 0.6 x 2.7 cm.   Gallbladder: Several mobile calcified gallstones, the largest measuring 7 mm, and sludge.  No wall thickening, mural hyperemia, or pericholecystic fluid.  No sonographic Christian's sign.   Gallbladder is distended.   Biliary system: The common duct is not dilated for age, measuring 6 mm.  No intrahepatic ductal dilatation.   Spleen: Upper limit of normal in size with a homogeneous echotexture, measuring 12.2 x 5.0 cm.   Pancreas: The visualized portions of pancreas appear normal.   Miscellaneous: No ascites.  Bilateral pleural effusion.  Limited evaluation of the right kidney is negative for hydronephrosis.   Impression:  1. Cholelithiasis and gallbladder sludge without evidence of cholecystitis.  Distended gallbladder.   2. Hepatomegaly.   3. Bilateral pleural effusion.   X-Ray Chest AP Portable 3/3/25: FINDINGS:   Cardiac wires overlie the chest.  Cardiomediastinal silhouette is enlarged and similar to the prior study.  Right-sided central venous catheter overlies the SVC.  Cardiac pacing device is again present.  Atherosclerotic calcifications overlie the aortic arch.  Central vascular congestion with bilateral interstitial opacities, likely pulmonary edema.  Small to moderate bilateral pleural effusions.  Passive atelectasis or airspace disease in the lung bases.  Upper lungs are relatively clear.  No distinct pneumothorax.   Impression:  Cardiomegaly with pulmonary edema and bilateral pleural effusions.  Aeration is similar to mildly worse when compared with 02/19/2025.   New right-sided central venous catheter overlying the SVC.   US Retroperitoneal Complete 3/3/25: FINDINGS:   Right kidney: The right kidney measures 10.7 cm. No cortical thinning. No loss of corticomedullary distinction.  Diminished perfusion.  Resistive index measures 1.0.  No mass. Few echogenic foci with posterior shadowing measuring up to 0.5 cm.  No hydronephrosis.   Left kidney: The left kidney measures 11.0 cm. No cortical thinning. No loss of corticomedullary distinction.  Diminished perfusion.  Resistive index measures 1.0.  No mass.  Few echogenic foci with posterior shadowing measuring up to 0.3 cm.   No  hydronephrosis.   Splenic resistive index measures 0.68.   The bladder is partially distended at the time of scanning and has an unremarkable appearance.   Left pleural effusion.   Impression:  Signs of medical renal disease bilaterally.  No hydronephrosis.   Suspected small nonobstructing renal stones bilaterally.   Left pleural effusion.   Transthoracic echo complete 3/3/25:     Left Ventricle: The left ventricle is normal in size. Ventricular mass is normal. Normal wall thickness. There is concentric remodeling. There is normal systolic function with a visually estimated ejection fraction of 55%. Quantitated ejection fraction is 50%. There is diastolic dysfunction. Elevated left ventricular filling pressure.    Left Ventricle: Regional wall motion abnormalities present.    Right Ventricle: The right ventricle is normal in size. Systolic function is borderline low.    Left Atrium: severely dilated    Aortic Valve: There is moderate aortic valve sclerosis. Severely restricted motion. There is severe stenosis. Aortic valve area by VTI is 0.7 cm2. Aortic valve peak velocity is 4.2 m/s. Mean gradient is 40 mmHg. The dimensionless index is 0.23.    Mitral Valve: There is mild anterior leaflet sclerosis. There is severe posterior mitral annular calcification present. There is mild to moderate stenosis. The mean pressure gradient across the mitral valve is 6 mmHg at a heart rate of 89 bpm. There is mild to moderate regurgitation. MVA 1.9cm2 by planimetry    Tricuspid Valve: There is moderate regurgitation.    Pulmonary Artery: The estimated pulmonary artery systolic pressure is 73 mmHg.    IVC/SVC: Elevated venous pressure at 15 mmHg.    Pericardium: There is a moderate circumferential effusion. Bilateral pleural effusions.    <30% respiratory variation across mitral valve, no diastolic RV collapse, however effusion moderate and new, would continue to monitor closely.    Assessment and Plan     Assessment &  Plan  Hypervolemia associated with renal insufficiency  Dialysis for fluid removal. Started furosemide. Ineffective by mouth. Try bumetanide.   Anxiety  -Chronic issue.  -PRN hydroxyzine.  Primary hypertension  Patient's blood pressure range in the last 24 hours was: BP  Min: 118/75  Max: 171/71.The patient's inpatient anti-hypertensive regimen is listed below:  Current Antihypertensives  isosorbide mononitrate 24 hr tablet 60 mg, Daily, Oral  metoprolol succinate (TOPROL-XL) 24 hr tablet 25 mg, Daily, Oral  hydrALAZINE tablet 50 mg, Every 8 hours, Oral  bumetanide tablet 4 mg, 2 times daily, Oral    Plan  - BP is controlled, no changes needed to their regimen    3/4- /58   Pulse 98. HD planned today.   Follicular lymphoma  S/p chemo in 2010. In remission.   Mixed hyperlipidemia   Patient is chronically on statin.will continue for now. Monitor clinically. Last LDL was   Lab Results   Component Value Date    LDLCALC 105.6 01/14/2025      Coronary artery disease of native artery of native heart with stable angina pectoris  Continue home aspirin, atorvastatin, and ticagrelor.  Pulmonary hypertension -- echo 11/2019  Chronic.    Peripheral vascular disease in diabetes mellitus -- CLEMENT 05/2019  -Chronic issue.  -Continue home aspirin and atorvastatin.  Acute on chronic diastolic heart failure  Volume removal with dialysis. Trying furosemide. Change to bumetanide. Monitor intake/output, electrolytes.  Type 2 diabetes mellitus with stage 3b chronic kidney disease, with long-term current use of insulin  Patient's FSGs are controlled on current hypoglycemics.   Last A1c reviewed-   Lab Results   Component Value Date    HGBA1C 6.6 (H) 02/27/2025     Most recent fingerstick glucose reviewed-   Recent Labs   Lab 03/05/25  0819 03/05/25  1132 03/05/25 2056 03/06/25  0724   POCTGLUCOSE 134* 177* 166* 129*     Current correctional scale  Low  Maintain anti-hyperglycemic dose as follows-   Antihyperglycemics (From admission,  onward)      Start     Stop Route Frequency Ordered    03/03/25 2100  insulin glargine U-100 (Lantus) pen 5 Units         -- SubQ Nightly 03/03/25 0233    03/03/25 0227  insulin aspart U-100 pen 0-5 Units         -- SubQ Before meals & nightly PRN 03/03/25 0128        -Accuchecks AC/HS  -Diabetic/renal diet  Aortic stenosis  New finding. Contributes to heart failure.  Constipation  Bisacodyl, senna-docusate, and lactulose prn.  Anemia  Anemia is likely due to chronic disease due to Chronic Kidney Disease. Most recent hemoglobin and hematocrit are listed below.  Recent Labs     03/04/25  0631 03/05/25  0339   HGB 7.5* 7.9*   HCT 24.7* 26.5*     Plan  - Monitor serial CBC: Daily  - Transfuse PRBC if patient becomes hemodynamically unstable, symptomatic or H/H drops below 7/21.  - Patient has not received any PRBC transfusions to date  - Patient's anemia is currently stable  CHB (complete heart block)  S/p pacemaker    Acute kidney injury superimposed on stage 3b chronic kidney disease  On hemodialysis since 2/22/2025. Anuric since starting but was able to urinate with furosemide.    Baseline creatinine is unknown. Most recent creatinine and eGFR are listed below.  Recent Labs     03/04/25 0631 03/05/25 0339 03/05/25 2017   CREATININE 2.0* 1.5* 1.1   EGFRNORACEVR 25.7* 36.3* 52.7*      Plan  - Dialysis per Nephrology.  Cardiac pacemaker in situ  2/17/2025. No acute issues.  GERD (gastroesophageal reflux disease)  Continue home pantoprazole.  VTE Risk Mitigation (From admission, onward)           Ordered     heparin (porcine) injection 1,000 Units  As needed (PRN)         03/04/25 1302     heparin (porcine) injection 5,000 Units  Every 8 hours         03/03/25 0128     IP VTE HIGH RISK PATIENT  Once         03/03/25 0128     Place sequential compression device  Until discontinued         03/03/25 0128                    Discharge Planning   MIKAHIL: 3/7/2025     Code Status: Full Code   Medical Readiness for Discharge  Date:   Discharge Plan A: Home with family, Home Health                        Jairon Morales MD  Department of Hospital Medicine   [facility] Surg

## 2025-03-07 ENCOUNTER — PATIENT OUTREACH (OUTPATIENT)
Facility: OTHER | Age: 75
End: 2025-03-07
Payer: MEDICARE

## 2025-03-07 PROBLEM — R06.02 SHORTNESS OF BREATH: Status: ACTIVE | Noted: 2025-03-07

## 2025-03-07 PROBLEM — Z71.89 GOALS OF CARE, COUNSELING/DISCUSSION: Status: ACTIVE | Noted: 2025-03-07

## 2025-03-07 PROBLEM — Z51.5 PALLIATIVE CARE ENCOUNTER: Status: ACTIVE | Noted: 2021-10-22

## 2025-03-07 PROBLEM — Z71.89 ADVANCED CARE PLANNING/COUNSELING DISCUSSION: Status: ACTIVE | Noted: 2025-03-07

## 2025-03-07 PROBLEM — N18.6 ESRD (END STAGE RENAL DISEASE): Status: ACTIVE | Noted: 2025-03-07

## 2025-03-07 LAB
POCT GLUCOSE: 139 MG/DL (ref 70–110)
POCT GLUCOSE: 174 MG/DL (ref 70–110)
POCT GLUCOSE: 188 MG/DL (ref 70–110)
POCT GLUCOSE: 235 MG/DL (ref 70–110)

## 2025-03-07 PROCEDURE — 99497 ADVNCD CARE PLAN 30 MIN: CPT | Mod: HCNC,25,, | Performed by: CLINICAL NURSE SPECIALIST

## 2025-03-07 PROCEDURE — 25000242 PHARM REV CODE 250 ALT 637 W/ HCPCS: Mod: HCNC | Performed by: NURSE PRACTITIONER

## 2025-03-07 PROCEDURE — 25000003 PHARM REV CODE 250: Mod: HCNC | Performed by: HOSPITALIST

## 2025-03-07 PROCEDURE — 99498 ADVNCD CARE PLAN ADDL 30 MIN: CPT | Mod: HCNC,,, | Performed by: CLINICAL NURSE SPECIALIST

## 2025-03-07 PROCEDURE — 27000221 HC OXYGEN, UP TO 24 HOURS: Mod: HCNC

## 2025-03-07 PROCEDURE — 21400001 HC TELEMETRY ROOM: Mod: HCNC

## 2025-03-07 PROCEDURE — 25000003 PHARM REV CODE 250: Mod: HCNC | Performed by: NURSE PRACTITIONER

## 2025-03-07 PROCEDURE — 63600175 PHARM REV CODE 636 W HCPCS: Mod: HCNC | Performed by: NURSE PRACTITIONER

## 2025-03-07 PROCEDURE — 99223 1ST HOSP IP/OBS HIGH 75: CPT | Mod: HCNC,25,, | Performed by: CLINICAL NURSE SPECIALIST

## 2025-03-07 RX ORDER — SODIUM CHLORIDE 9 MG/ML
INJECTION, SOLUTION INTRAVENOUS ONCE
Status: CANCELLED | OUTPATIENT
Start: 2025-03-08

## 2025-03-07 RX ORDER — OXYCODONE HYDROCHLORIDE 10 MG/1
10 TABLET ORAL EVERY 6 HOURS PRN
Refills: 0 | Status: DISCONTINUED | OUTPATIENT
Start: 2025-03-07 | End: 2025-03-12 | Stop reason: HOSPADM

## 2025-03-07 RX ORDER — OXYCODONE HCL 10 MG/1
10 TABLET, FILM COATED, EXTENDED RELEASE ORAL EVERY 12 HOURS
Refills: 0 | Status: DISCONTINUED | OUTPATIENT
Start: 2025-03-07 | End: 2025-03-12 | Stop reason: HOSPADM

## 2025-03-07 RX ADMIN — OXYCODONE HYDROCHLORIDE 10 MG: 10 TABLET ORAL at 10:03

## 2025-03-07 RX ADMIN — MUPIROCIN: 20 OINTMENT TOPICAL at 09:03

## 2025-03-07 RX ADMIN — PANTOPRAZOLE SODIUM 40 MG: 40 TABLET, DELAYED RELEASE ORAL at 08:03

## 2025-03-07 RX ADMIN — OXYCODONE HYDROCHLORIDE 10 MG: 10 TABLET, FILM COATED, EXTENDED RELEASE ORAL at 09:03

## 2025-03-07 RX ADMIN — ASPIRIN 81 MG CHEWABLE TABLET 81 MG: 81 TABLET CHEWABLE at 08:03

## 2025-03-07 RX ADMIN — HYDRALAZINE HYDROCHLORIDE 50 MG: 25 TABLET ORAL at 06:03

## 2025-03-07 RX ADMIN — BUMETANIDE 4 MG: 1 TABLET ORAL at 04:03

## 2025-03-07 RX ADMIN — INSULIN GLARGINE 5 UNITS: 100 INJECTION, SOLUTION SUBCUTANEOUS at 09:03

## 2025-03-07 RX ADMIN — INSULIN ASPART 2 UNITS: 100 INJECTION, SOLUTION INTRAVENOUS; SUBCUTANEOUS at 12:03

## 2025-03-07 RX ADMIN — HYDRALAZINE HYDROCHLORIDE 50 MG: 25 TABLET ORAL at 09:03

## 2025-03-07 RX ADMIN — Medication 6 MG: at 10:03

## 2025-03-07 RX ADMIN — THERA TABS 1 TABLET: TAB at 08:03

## 2025-03-07 RX ADMIN — FLUOXETINE HYDROCHLORIDE 40 MG: 20 CAPSULE ORAL at 08:03

## 2025-03-07 RX ADMIN — ISOSORBIDE MONONITRATE 60 MG: 60 TABLET, EXTENDED RELEASE ORAL at 08:03

## 2025-03-07 RX ADMIN — HEPARIN SODIUM 5000 UNITS: 5000 INJECTION INTRAVENOUS; SUBCUTANEOUS at 09:03

## 2025-03-07 RX ADMIN — LORAZEPAM 1 MG: 1 TABLET ORAL at 08:03

## 2025-03-07 RX ADMIN — METOPROLOL SUCCINATE 25 MG: 25 TABLET, EXTENDED RELEASE ORAL at 08:03

## 2025-03-07 RX ADMIN — MUPIROCIN: 20 OINTMENT TOPICAL at 10:03

## 2025-03-07 RX ADMIN — ATORVASTATIN CALCIUM 80 MG: 40 TABLET, FILM COATED ORAL at 09:03

## 2025-03-07 RX ADMIN — HYDRALAZINE HYDROCHLORIDE 50 MG: 25 TABLET ORAL at 02:03

## 2025-03-07 RX ADMIN — HEPARIN SODIUM 5000 UNITS: 5000 INJECTION INTRAVENOUS; SUBCUTANEOUS at 02:03

## 2025-03-07 RX ADMIN — TICAGRELOR 60 MG: 60 TABLET ORAL at 09:03

## 2025-03-07 RX ADMIN — TICAGRELOR 60 MG: 60 TABLET ORAL at 08:03

## 2025-03-07 RX ADMIN — HEPARIN SODIUM 5000 UNITS: 5000 INJECTION INTRAVENOUS; SUBCUTANEOUS at 06:03

## 2025-03-07 RX ADMIN — BUMETANIDE 4 MG: 1 TABLET ORAL at 08:03

## 2025-03-07 NOTE — CONSULTS
Pal med consulted for symptom management   Chart reviewed and patient discussed with stefany team.  Recommendations provided    Full consult note to follow    Thank you for consult and opportunity to participate in Mrs. De La O care.

## 2025-03-07 NOTE — ASSESSMENT & PLAN NOTE
On hemodialysis since 2/22/2025. Anuric since starting but was able to urinate 300 mL with 300 mg of IV furosemide but none since.    Baseline creatinine is unknown. Most recent creatinine and eGFR are listed below.  Recent Labs     03/05/25  0339 03/05/25 2017 03/06/25  1503   CREATININE 1.5* 1.1 1.5*   EGFRNORACEVR 36.3* 52.7* 36.3*      Plan  - Dialysis per Nephrology.  - Consult Palliative Care for medication management of air hunger per Nephrology recommendation.

## 2025-03-07 NOTE — NURSING
3 hour dialysis complete.  Blood returned.  RIJ PC flushed, heparin locked, capped and  wrapped in gauze.    Net UF 2L.  Unable to remove more as ordered d/t SBP drop to 90's   Asymptomatic.  Dr. Capone notified.    Transported from [facility] to room 610 via stretcher by  transporter.

## 2025-03-07 NOTE — CLINICAL REVIEW
Intake/Output Summary (Last 24 hours) at 3/7/2025 0711  Last data filed at 3/6/2025 1755  Gross per 24 hour   Intake 600 ml   Output 2600 ml   Net -2000 ml       Vitals:    03/06/25 2237 03/07/25 0018 03/07/25 0236 03/07/25 0436   BP:  (!) 119/58  (!) 117/59   BP Location:       Patient Position:       Pulse:  105 101 102   Resp:       Temp:  98.1 °F (36.7 °C)  98.2 °F (36.8 °C)   TempSrc:       SpO2: 98% 99%  95%   Weight:       Height:           Recent Labs   Lab 03/04/25  0631 03/05/25  0339 03/05/25 2017 03/06/25  1503   * 133* 135* 138   K 3.3* 3.4* 3.8 3.6    103 104 106   CO2 22* 22* 23 25   BUN 25* 14 7* 12   CREATININE 2.0* 1.5* 1.1 1.5*   CALCIUM 8.1* 8.0* 8.4* 8.2*   PHOS 3.1 2.3*  --  2.7     Yesterday unable to pull more than 2 L UF d/t hypotension. Negative 9 L in last three days (dialysis on 3/4, 3/5, 3/6). Rec palliative consult for medications for air hunger. Next HD session 3/8.     No other related issues identified. Please call Nephrology as needed; We will continue to follow.

## 2025-03-07 NOTE — PLAN OF CARE
APPOINTMENT:    Patient Appointment(s) scheduled with   Meg Herrera NP  Monday Mar 10, 2025 9:00 AM

## 2025-03-07 NOTE — ASSESSMENT & PLAN NOTE
Patient's FSGs are controlled on current hypoglycemics.   Last A1c reviewed-   Lab Results   Component Value Date    HGBA1C 6.6 (H) 02/27/2025     Most recent fingerstick glucose reviewed-   Recent Labs   Lab 03/06/25  1153 03/06/25  1749   POCTGLUCOSE 151* 104     Current correctional scale  Low  Maintain anti-hyperglycemic dose as follows-   Antihyperglycemics (From admission, onward)      Start     Stop Route Frequency Ordered    03/03/25 2100  insulin glargine U-100 (Lantus) pen 5 Units         -- SubQ Nightly 03/03/25 0233    03/03/25 0227  insulin aspart U-100 pen 0-5 Units         -- SubQ Before meals & nightly PRN 03/03/25 0128        -Accuchecks AC/HS  -Diabetic/renal diet

## 2025-03-07 NOTE — ASSESSMENT & PLAN NOTE
Patient's blood pressure range in the last 24 hours was: BP  Min: 90/38  Max: 149/65.The patient's inpatient anti-hypertensive regimen is listed below:  Current Antihypertensives  isosorbide mononitrate 24 hr tablet 60 mg, Daily, Oral  metoprolol succinate (TOPROL-XL) 24 hr tablet 25 mg, Daily, Oral  hydrALAZINE tablet 50 mg, Every 8 hours, Oral  bumetanide tablet 4 mg, 2 times daily, Oral    Plan  - BP is controlled, no changes needed to their regimen    3/4- /58   Pulse 98. HD planned today.

## 2025-03-07 NOTE — ASSESSMENT & PLAN NOTE
Anemia is likely due to chronic disease due to Chronic Kidney Disease. Most recent hemoglobin and hematocrit are listed below.  Recent Labs     03/05/25  0339   HGB 7.9*   HCT 26.5*     Plan  - Monitor serial CBC: Daily  - Transfuse PRBC if patient becomes hemodynamically unstable, symptomatic or H/H drops below 7/21.  - Patient has not received any PRBC transfusions to date  - Patient's anemia is currently stable

## 2025-03-07 NOTE — ASSESSMENT & PLAN NOTE
Volume removal with dialysis. Oral furosemide and bumetanide were not effective. Monitor intake/output, electrolytes.

## 2025-03-07 NOTE — SUBJECTIVE & OBJECTIVE
Interval History: Bumetanide 4 mg PO ineffective. Only 300 mL of urine output total during this admission, made during the first 24 hours.     Review of Systems   Constitutional:  Negative for chills and fever.   Respiratory:  Positive for shortness of breath.    Neurological:  Negative for seizures and syncope.     Objective:     Vital Signs (Most Recent):  Temp: 98.2 °F (36.8 °C) (03/07/25 0436)  Pulse: 102 (03/07/25 0436)  Resp: 17 (03/06/25 2153)  BP: (!) 117/59 (03/07/25 0436)  SpO2: 95 % (03/07/25 0436) Vital Signs (24h Range):  Temp:  [96.9 °F (36.1 °C)-98.2 °F (36.8 °C)] 98.2 °F (36.8 °C)  Pulse:  [] 102  Resp:  [17] 17  SpO2:  [95 %-99 %] 95 %  BP: ()/(38-65) 117/59     Weight: 85.7 kg (189 lb)  Body mass index is 27.91 kg/m².    Intake/Output Summary (Last 24 hours) at 3/7/2025 0728  Last data filed at 3/6/2025 1755  Gross per 24 hour   Intake 600 ml   Output 2600 ml   Net -2000 ml         Physical Exam  Vitals and nursing note reviewed.   Constitutional:       General: She is not in acute distress.     Appearance: She is well-developed. She is not diaphoretic.      Interventions: She is not intubated.Nasal cannula in place.   Pulmonary:      Effort: Pulmonary effort is normal. No accessory muscle usage or respiratory distress. She is not intubated.   Musculoskeletal:      Right lower leg: Edema present.      Left lower leg: Edema present.   Neurological:      Mental Status: She is alert and oriented to person, place, and time. Mental status is at baseline.      Motor: No seizure activity.   Psychiatric:         Behavior: Behavior is not agitated, aggressive or hyperactive. Behavior is cooperative.               Significant Labs: All pertinent labs within the past 24 hours have been reviewed.    Significant Imaging: I have reviewed all pertinent imaging results/findings within the past 24 hours.

## 2025-03-07 NOTE — PROGRESS NOTES
[facility] Medicine  Progress Note    Patient Name: Lorena Contreras  MRN: [id number]  Patient Class: IP- Inpatient   Admission Date: 3/2/2025  Length of Stay: 4 days  Attending Physician: Jairon Morales MD  Primary Care Provider: Jorge Paris MD        Subjective     Principal Problem:Hypervolemia associated with renal insufficiency        HPI:  Lorena Contreras is a 74 year old white woman with hypertension, hyperlipidemia, diabetes mellitus type 2, coronary artery disease, history of stroke, complete heart block status post dual chamber cardiac pacemaker placed 2/17/2025, pulmonary hypertension, chronic diastolic heart failure, peripheral vascular disease, chronic kidney disease stage 3b (on hemodialysis since 2/22/2025), anemia, follicular lymphoma diagnosed in 2009 treated with chemotherapy, gastroesophageal reflux disease, constipation, restless legs syndrome, fibromyalgia, anxiety, depression, history of right elbow dislocation repair in 2015, history of incision and drainage of right foot on 6/2/2021, history of open reduction and internal fixation of right pilon fracture on 8/14/2024 and right acetabulum fracture on 8/16/2024, history of right medial ankle fasciocutaneous flap and excisional debridement of left ankle medial wound on 9/25/2024, history of tonsillectomy in 1955. She lives in [place], [place]. She is . Her primary care physician is Dr. Jorge Paris.    She presented to [facility] Emergency Department on 3/2/2025 with worsening shortness of breath and chest tightness since being discharged from [facility] on 2/25/2025, where she was admitted on 2/15/2025 for complete heart block, acute kidney injury, and non-ST elevation myocardial infarction. She had mild improvement of symptoms after hemodialysis lasting several hours. She had associated dyspnea on exertion, significant swelling of bilateral lower extremities and  abdomen, orthopnea, wheezing, fatigue, and generalized weakness. Chest tightness was non-radiating, substernal, and worse with musculoskeletal manipulation and deep breathing, but unaffected by exertion and positioning. She had diffuse leg pain due to swelling, generalized abdominal discomfort described as dull, nagging pain, and nausea and vomiting. She has been anuric since starting dialysis. She had not missed any dialysis treatments.   In the emergency department, she had tachypnea and mild tachycardia. She was placed on 2 liters/minute of supplemental oxygen for comfort. Labs showed stable anemia and elevated WBC (29538/uL), bicarbonate 20 mmol/L, creatinine 2.1 mg/dL (baseline prior to starting dialysis was around 2.0), glucose 194 mg/dL, calcium 8.6 mg/dL, albumin 3.1 g/dL, BNP 1335 pg/mL, high sensitivity troponin 64 ng/L (repeat 57). EKG showed sinus rhythm with bigeminy, rate 70 bpm, no ST elevation or depression. Chest X-ray showed cardiomegaly with pulmonary edema and bilateral pleural effusions with aeration similar to mildly worse than 2/19/2025. Abdominal ultrasound showed cholelithiasis and gallbladder sludge without evidence of cholecystitis, distended gallbladder, hepatomegaly, bilateral pleural effusions. She was given acetaminophen and 100 mg of IV furosemide. She was admitted to Hospital Medicine Team S.    Overview/Hospital Course:  Nephrology was consulted for dialysis. She urinated 300 mL after furosemide. She had not urinated since started dialysis. She was put on IV furosemide 80 mg twice daily. She takes oxycodone 10 mg at home and requested something stronger. Oxycodone was increased to her home dose. On 3/5/2025, furosemide was changed to 80 mg by mouth, which was ineffective. It was changed to bumetanide 4 mg, which was also ineffective. Nephrology recommended Palliative Care consult for medication management of air hunger.     Interval History: Bumetanide 4 mg PO ineffective. Only 300  mL of urine output total during this admission, made during the first 24 hours.     Review of Systems   Constitutional:  Negative for chills and fever.   Respiratory:  Positive for shortness of breath.    Neurological:  Negative for seizures and syncope.     Objective:     Vital Signs (Most Recent):  Temp: 98.2 °F (36.8 °C) (03/07/25 0436)  Pulse: 102 (03/07/25 0436)  Resp: 17 (03/06/25 2153)  BP: (!) 117/59 (03/07/25 0436)  SpO2: 95 % (03/07/25 0436) Vital Signs (24h Range):  Temp:  [96.9 °F (36.1 °C)-98.2 °F (36.8 °C)] 98.2 °F (36.8 °C)  Pulse:  [] 102  Resp:  [17] 17  SpO2:  [95 %-99 %] 95 %  BP: ()/(38-65) 117/59     Weight: 85.7 kg (189 lb)  Body mass index is 27.91 kg/m².    Intake/Output Summary (Last 24 hours) at 3/7/2025 0728  Last data filed at 3/6/2025 1755  Gross per 24 hour   Intake 600 ml   Output 2600 ml   Net -2000 ml         Physical Exam  Vitals and nursing note reviewed.   Constitutional:       General: She is not in acute distress.     Appearance: She is well-developed. She is not diaphoretic.      Interventions: She is not intubated.Nasal cannula in place.   Pulmonary:      Effort: Pulmonary effort is normal. No accessory muscle usage or respiratory distress. She is not intubated.   Musculoskeletal:      Right lower leg: Edema present.      Left lower leg: Edema present.   Neurological:      Mental Status: She is alert and oriented to person, place, and time. Mental status is at baseline.      Motor: No seizure activity.   Psychiatric:         Behavior: Behavior is not agitated, aggressive or hyperactive. Behavior is cooperative.               Significant Labs: All pertinent labs within the past 24 hours have been reviewed.    Significant Imaging: I have reviewed all pertinent imaging results/findings within the past 24 hours.      Assessment & Plan  Hypervolemia associated with renal insufficiency  Dialysis for fluid removal. Started furosemide. Ineffective by mouth. Try bumetanide.    Anxiety  -Chronic issue.  -PRN hydroxyzine.  Primary hypertension  Patient's blood pressure range in the last 24 hours was: BP  Min: 90/38  Max: 149/65.The patient's inpatient anti-hypertensive regimen is listed below:  Current Antihypertensives  isosorbide mononitrate 24 hr tablet 60 mg, Daily, Oral  metoprolol succinate (TOPROL-XL) 24 hr tablet 25 mg, Daily, Oral  hydrALAZINE tablet 50 mg, Every 8 hours, Oral  bumetanide tablet 4 mg, 2 times daily, Oral    Plan  - BP is controlled, no changes needed to their regimen    3/4- /58   Pulse 98. HD planned today.   Follicular lymphoma  S/p chemo in 2010. In remission.   Mixed hyperlipidemia   Patient is chronically on statin.will continue for now. Monitor clinically. Last LDL was   Lab Results   Component Value Date    LDLCALC 105.6 01/14/2025      Coronary artery disease of native artery of native heart with stable angina pectoris  Continue home aspirin, atorvastatin, and ticagrelor.  Pulmonary hypertension -- echo 11/2019  Chronic.    Peripheral vascular disease in diabetes mellitus -- CLEMENT 05/2019  -Chronic issue.  -Continue home aspirin and atorvastatin.  Acute on chronic diastolic heart failure  Volume removal with dialysis. Oral furosemide and bumetanide were not effective. Monitor intake/output, electrolytes.  Type 2 diabetes mellitus with stage 3b chronic kidney disease, with long-term current use of insulin  Patient's FSGs are controlled on current hypoglycemics.   Last A1c reviewed-   Lab Results   Component Value Date    HGBA1C 6.6 (H) 02/27/2025     Most recent fingerstick glucose reviewed-   Recent Labs   Lab 03/06/25  1153 03/06/25  1749   POCTGLUCOSE 151* 104     Current correctional scale  Low  Maintain anti-hyperglycemic dose as follows-   Antihyperglycemics (From admission, onward)      Start     Stop Route Frequency Ordered    03/03/25 2100  insulin glargine U-100 (Lantus) pen 5 Units         -- SubQ Nightly 03/03/25 0233    03/03/25 9727   insulin aspart U-100 pen 0-5 Units         -- SubQ Before meals & nightly PRN 03/03/25 0128        -Accuchecks AC/HS  -Diabetic/renal diet  Aortic stenosis  New finding. Contributes to heart failure.  Constipation  Bisacodyl, senna-docusate, and lactulose prn.  Anemia  Anemia is likely due to chronic disease due to Chronic Kidney Disease. Most recent hemoglobin and hematocrit are listed below.  Recent Labs     03/05/25  0339   HGB 7.9*   HCT 26.5*     Plan  - Monitor serial CBC: Daily  - Transfuse PRBC if patient becomes hemodynamically unstable, symptomatic or H/H drops below 7/21.  - Patient has not received any PRBC transfusions to date  - Patient's anemia is currently stable  CHB (complete heart block)  S/p pacemaker    Acute kidney injury superimposed on stage 3b chronic kidney disease  On hemodialysis since 2/22/2025. Anuric since starting but was able to urinate 300 mL with 300 mg of IV furosemide but none since.    Baseline creatinine is unknown. Most recent creatinine and eGFR are listed below.  Recent Labs     03/05/25  0339 03/05/25 2017 03/06/25  1503   CREATININE 1.5* 1.1 1.5*   EGFRNORACEVR 36.3* 52.7* 36.3*      Plan  - Dialysis per Nephrology.  - Consult Palliative Care for medication management of air hunger per Nephrology recommendation.  Cardiac pacemaker in situ  2/17/2025. No acute issues.  GERD (gastroesophageal reflux disease)  Continue home pantoprazole.  VTE Risk Mitigation (From admission, onward)           Ordered     heparin (porcine) injection 1,000 Units  As needed (PRN)         03/04/25 1302     heparin (porcine) injection 5,000 Units  Every 8 hours         03/03/25 0128     IP VTE HIGH RISK PATIENT  Once         03/03/25 0128     Place sequential compression device  Until discontinued         03/03/25 0128                    Discharge Planning   MIKHAIL: 3/7/2025     Code Status: Full Code   Medical Readiness for Discharge Date:   Discharge Plan A: Home, Home with family, Home Health    Discharge Delays: None known at this time                    Jairon Morales MD  Department of Hospital Medicine   [facility] Surg

## 2025-03-07 NOTE — PLAN OF CARE
Advance Care Planning   [facility] Surg  Palliative Care   Psychosocial Assessment    Patient Name: Lorena Contreras  MRN: [id number]  Admission Date: 3/2/2025  Hospital Length of Stay: 4 days  Code Status: Full Code   Attending Provider: Jairon Morales MD  Palliative Care Provider:   Primary Care Physician: Jorge Paris MD  Principal Problem:Hypervolemia associated with renal insufficiency    Reason for Referral: assistance with clarification of goals of care  Consult Order Date:   Primary CM/SW:    Present during Interview: patient, relative(s), past medical records, and ER records.      Primary Language:Dominican    Needed: no      Past Medical Situation:   PMH:   Past Medical History:   Diagnosis Date    Age-related osteoporosis with current pathological fracture with routine healing 11/13/2024    Allergy     Altered mental status 06/19/2022    DYSARTHRIA, SPASTIC MOVEMENTS & DIFFICULTY SWALLOWING    Anemia     Anxiety     Arthritis     C. difficile colitis 09/29/2024    Currently treated with po vancomycin      Cataract     both removed    Colon polyps     Coronary artery disease     Depression     Diabetes mellitus, type II     Disorder of kidney and ureter     Epilepsia partialis continua 04/28/2023    Fibromyalgia     Follicular lymphoma     GERD (gastroesophageal reflux disease)     HTN (hypertension)     Hyperlipidemia     MI (myocardial infarction) 03/2019    Personal history of colonic polyps     Restless leg syndrome     Stroke      Mental Health/Substance Use History: pt endorses hx of depression and anxiety   Risk of Abuse, neglect or exploitation: none identified   Current or Previous Trauma and/or evidence of PTSD: none disclosed  Non-traditional Health practices: none disclosed     Understanding of diagnosis and prognosis: good   Experience/Comfort level with health care system: no issues identified     Patients Mental Status: AAOx3     Socio-Economic  Factors/Resources:  Address: [address] [place] [address] [place]  Phone Number: [phone] (home) [phone] (work)    Marital Status:    Household composition: pt lives alone, but sister has moved in to care for her   Children: 2 dtrs, Noelle and Brooke     Patient/Family perceptions about Caregiving Needs; availability and capacity: pt/family are aware of increased cg needs.     Family Structure, Dynamics/Relationships: pt has a good relationship with supportive family.     Patterns/Styles of Communication and Decision-making in the Family: pt is decisional     Patient/Family Coping: appropriate     Activities of Daily Living: needs assistance   Support Systems-Family & Community (Home Health, HME etc): TBD     Transportation:  yes    Work/Education History: retired  Self-Care Activities/Hobbies: crafting      History: no    Financial Resources:Medicare      Advanced Care Planning & Legal Concerns:   Advanced Directives/Living Will: no  [place]ST: no   Planning:  no    Medical Power of : no     Oral/Written Declaration: no  Witnesses:   Surrogate Decision Maker:     Emergency Contacts:    Spirituality, Culture & Coping Mechanisms:  F- Marcia and Belief: Jewish     I - Importance: high    C - Community/Culture Values: pt enjoys  visits, communion      A - Address in Care: yes       Goals/Hopes/Expectations: comfort, QOL, and life prolongation   Fears/Anxiety/Concerns: dyspnea         Preferences about EOL Environment: (own bed, family nearby, pets, music, etc): TBD       Complicated Bereavement Risk Assessment Tool (CBRAT)  Reference:  [facility] Clinical Practice Group (May 2016). Bereavement Risk Screening and Management Guidelines.  Retrieved from: http://www.grpcc.com.au/wp-content/uploads//XSMGV-Vayokiypklc-Lmbxixvdo-and-Management-Guideline-2016.pdf      Bereaved Client Characteristics   Under 18      no  Was a Twin   no  Young  Spouse   no  Elderly Spouse    no  Isolated    no  Lacks Meaningful Social Support   no  Dissatisfied with help available during illness   no  New to Financial [place] no  New to Decision-Making   no    Illness  Inherited Disorder   no  Stigmatized Disease in the family/community   no  Lengthy/Burdensome   yes     Bereaved Client's History of Loss   Cumulative Multiple Losses   no  Previous Mental Health Illnesses   no  Current Mental Health Illness   no  Other Significant Health Issues   no   Migrant/Refugee   no Will the Death be:  Sudden or Unexpected   yes  Traumatic Circumstances Associated with Death   no  Significant Cultural/Social Burdens as a result of Death   no   Relationship with   Profound Lifelong Partner   no  Highly Dependent    no  Antagonistic   no  Ambivalent   no  Deeply Connected   yes  Culturally Defined   yes   Risk Factors Scores  0-2  Low  3-5  Moderate  5+  High  All persons scoring moderate to high presume to be at risk**    (** It is acknowledged that protective factors and resilience may outweigh apparent risk factors.      Total Risk Factors Score:   4, moderate     Advance Care Planning     Date: 2025    [facility]  I engaged the patient and family in a voluntary conversation about advance care planning and we specifically addressed what the goals of care would be moving forward, in light of the patient's change in clinical status, specifically hypervolemia associated with renal insufficiency.  We did not specifically address the patient's likely prognosis, which is poor.  We explored the patient's values and preferences for future care.  The patient and family endorses that what is most important right now is to focus on spending time at home, avoiding the hospital, remaining as independent as possible, extending life as long as possible, even it it means sacrificing quality, curative/life-prolongation (regardless of treatment burdens), and comfort and QOL  "    Accordingly, we have decided that the best plan to meet the patient's goals includes continuing with treatment    A total of 45 min was spent on advance care planning, goals of care discussion, emotional support, formulating and communicating prognosis and exploring burden/benefit of various approaches of treatment. This discussion occurred on a fully voluntary basis with the verbal consent of the patient and/or family.         Discharge Planning Needs/Plan of Care:     LCSW, along with APRN , visited with pt and sister Milton at bedside. Pt AAOx3, endorsing SOB at rest and with exertion. Pt shares that she was doing well, until she got closed in a door at [facility], causing her to break her ankle and hip, which resulted in needing multiple surgeries. Since then, she has been having problems with heart and kidney failure, resulting in ESRD, and becoming dialysis dependent. Pt recognizes that she has serious illnesses that cannot be fixed, only medically managed. Pt wishes to continue that route and return to the hospital if necessary, as she wants to "live as long as possible." Pt endorses wanting to switch code status to DNR. Pt amenable to pal med clinic f/u to continue symptom management assistance and GOC conversations.     Ayesha Vega, JOVAN-BACS, [facility]-SW  Department of Palliative Medicine                 "

## 2025-03-08 ENCOUNTER — PATIENT OUTREACH (OUTPATIENT)
Facility: OTHER | Age: 75
End: 2025-03-08
Payer: MEDICARE

## 2025-03-08 LAB
ALBUMIN SERPL BCP-MCNC: 2.2 G/DL (ref 3.5–5.2)
ANION GAP SERPL CALC-SCNC: 8 MMOL/L (ref 8–16)
BUN SERPL-MCNC: 16 MG/DL (ref 8–23)
CALCIUM SERPL-MCNC: 7.9 MG/DL (ref 8.7–10.5)
CHLORIDE SERPL-SCNC: 106 MMOL/L (ref 95–110)
CO2 SERPL-SCNC: 22 MMOL/L (ref 23–29)
CREAT SERPL-MCNC: 1.9 MG/DL (ref 0.5–1.4)
EST. GFR  (NO RACE VARIABLE): 27.4 ML/MIN/1.73 M^2
GLUCOSE SERPL-MCNC: 149 MG/DL (ref 70–110)
PHOSPHATE SERPL-MCNC: 2.6 MG/DL (ref 2.7–4.5)
POCT GLUCOSE: 129 MG/DL (ref 70–110)
POCT GLUCOSE: 134 MG/DL (ref 70–110)
POCT GLUCOSE: 162 MG/DL (ref 70–110)
POCT GLUCOSE: 214 MG/DL (ref 70–110)
POTASSIUM SERPL-SCNC: 3.5 MMOL/L (ref 3.5–5.1)
SODIUM SERPL-SCNC: 136 MMOL/L (ref 136–145)
VIT B12 SERPL-MCNC: 1172 PG/ML (ref 210–950)

## 2025-03-08 PROCEDURE — 25000003 PHARM REV CODE 250: Mod: HCNC | Performed by: NURSE PRACTITIONER

## 2025-03-08 PROCEDURE — 90935 HEMODIALYSIS ONE EVALUATION: CPT | Mod: HCNC

## 2025-03-08 PROCEDURE — 82607 VITAMIN B-12: CPT | Mod: HCNC | Performed by: HOSPITALIST

## 2025-03-08 PROCEDURE — 25000003 PHARM REV CODE 250: Mod: HCNC | Performed by: HOSPITALIST

## 2025-03-08 PROCEDURE — 25000242 PHARM REV CODE 250 ALT 637 W/ HCPCS: Mod: HCNC | Performed by: NURSE PRACTITIONER

## 2025-03-08 PROCEDURE — 21400001 HC TELEMETRY ROOM: Mod: HCNC

## 2025-03-08 PROCEDURE — 80069 RENAL FUNCTION PANEL: CPT | Mod: HCNC | Performed by: HOSPITALIST

## 2025-03-08 PROCEDURE — 25000003 PHARM REV CODE 250: Mod: HCNC | Performed by: STUDENT IN AN ORGANIZED HEALTH CARE EDUCATION/TRAINING PROGRAM

## 2025-03-08 PROCEDURE — 36415 COLL VENOUS BLD VENIPUNCTURE: CPT | Mod: HCNC | Performed by: HOSPITALIST

## 2025-03-08 PROCEDURE — 63600175 PHARM REV CODE 636 W HCPCS: Mod: HCNC | Performed by: NURSE PRACTITIONER

## 2025-03-08 RX ORDER — SODIUM CHLORIDE 9 MG/ML
INJECTION, SOLUTION INTRAVENOUS
Status: DISCONTINUED | OUTPATIENT
Start: 2025-03-08 | End: 2025-03-12 | Stop reason: HOSPADM

## 2025-03-08 RX ADMIN — HYDRALAZINE HYDROCHLORIDE 50 MG: 25 TABLET ORAL at 07:03

## 2025-03-08 RX ADMIN — TICAGRELOR 60 MG: 60 TABLET ORAL at 10:03

## 2025-03-08 RX ADMIN — HYDRALAZINE HYDROCHLORIDE 50 MG: 25 TABLET ORAL at 08:03

## 2025-03-08 RX ADMIN — THERA TABS 1 TABLET: TAB at 08:03

## 2025-03-08 RX ADMIN — LORAZEPAM 1 MG: 1 TABLET ORAL at 08:03

## 2025-03-08 RX ADMIN — ISOSORBIDE MONONITRATE 60 MG: 60 TABLET, EXTENDED RELEASE ORAL at 08:03

## 2025-03-08 RX ADMIN — MUPIROCIN: 20 OINTMENT TOPICAL at 08:03

## 2025-03-08 RX ADMIN — PANTOPRAZOLE SODIUM 40 MG: 40 TABLET, DELAYED RELEASE ORAL at 08:03

## 2025-03-08 RX ADMIN — FLUOXETINE HYDROCHLORIDE 40 MG: 20 CAPSULE ORAL at 08:03

## 2025-03-08 RX ADMIN — INSULIN GLARGINE 5 UNITS: 100 INJECTION, SOLUTION SUBCUTANEOUS at 08:03

## 2025-03-08 RX ADMIN — ATORVASTATIN CALCIUM 80 MG: 40 TABLET, FILM COATED ORAL at 08:03

## 2025-03-08 RX ADMIN — BUMETANIDE 4 MG: 1 TABLET ORAL at 08:03

## 2025-03-08 RX ADMIN — HEPARIN SODIUM 5000 UNITS: 5000 INJECTION INTRAVENOUS; SUBCUTANEOUS at 08:03

## 2025-03-08 RX ADMIN — OXYCODONE HYDROCHLORIDE 10 MG: 10 TABLET, FILM COATED, EXTENDED RELEASE ORAL at 08:03

## 2025-03-08 RX ADMIN — HEPARIN SODIUM 5000 UNITS: 5000 INJECTION INTRAVENOUS; SUBCUTANEOUS at 07:03

## 2025-03-08 RX ADMIN — METOPROLOL SUCCINATE 25 MG: 25 TABLET, EXTENDED RELEASE ORAL at 08:03

## 2025-03-08 RX ADMIN — ASPIRIN 81 MG CHEWABLE TABLET 81 MG: 81 TABLET CHEWABLE at 08:03

## 2025-03-08 RX ADMIN — QUETIAPINE FUMARATE 25 MG: 25 TABLET ORAL at 08:03

## 2025-03-08 RX ADMIN — TICAGRELOR 60 MG: 60 TABLET ORAL at 08:03

## 2025-03-08 RX ADMIN — SODIUM CHLORIDE: 9 INJECTION, SOLUTION INTRAVENOUS at 04:03

## 2025-03-08 NOTE — PLAN OF CARE
Problem: Adult Inpatient Plan of Care  Goal: Plan of Care Review  Outcome: Progressing  Goal: Patient-Specific Goal (Individualized)  Outcome: Progressing  Goal: Absence of Hospital-Acquired Illness or Injury  Outcome: Progressing  Goal: Optimal Comfort and Wellbeing  Outcome: Progressing  Goal: Readiness for Transition of Care  Outcome: Progressing     Problem: Skin Injury Risk Increased  Goal: Skin Health and Integrity  Outcome: Progressing     Problem: Infection  Goal: Absence of Infection Signs and Symptoms  Outcome: Progressing     Problem: Hemodialysis  Goal: Safe, Effective Therapy Delivery  Outcome: Progressing  Goal: Effective Tissue Perfusion  Outcome: Progressing  Goal: Absence of Infection Signs and Symptoms  Outcome: Progressing     Problem: Diabetes Comorbidity  Goal: Blood Glucose Level Within Targeted Range  Outcome: Progressing     Problem: Acute Kidney Injury/Impairment  Goal: Fluid and Electrolyte Balance  Outcome: Progressing  Goal: Improved Oral Intake  Outcome: Progressing  Goal: Effective Renal Function  Outcome: Progressing     Problem: Wound  Goal: Optimal Coping  Outcome: Progressing  Goal: Optimal Functional Ability  Outcome: Progressing  Goal: Absence of Infection Signs and Symptoms  Outcome: Progressing  Goal: Improved Oral Intake  Outcome: Progressing  Goal: Optimal Pain Control and Function  Outcome: Progressing  Goal: Skin Health and Integrity  Outcome: Progressing  Goal: Optimal Wound Healing  Outcome: Progressing     Problem: Fall Injury Risk  Goal: Absence of Fall and Fall-Related Injury  Outcome: Progressing     Problem: Chronic Kidney Disease  Goal: Electrolyte Balance  Outcome: Progressing  Goal: Fluid Balance  Outcome: Progressing     Problem: Coping Ineffective  Goal: Effective Coping  Outcome: Progressing

## 2025-03-08 NOTE — ASSESSMENT & PLAN NOTE
Patient's FSGs are controlled on current hypoglycemics.   Last A1c reviewed-   Lab Results   Component Value Date    HGBA1C 6.6 (H) 02/27/2025     Most recent fingerstick glucose reviewed-   Recent Labs   Lab 03/07/25  2103 03/08/25  0833 03/08/25  1202   POCTGLUCOSE 188* 162* 214*     Current correctional scale  Low  Maintain anti-hyperglycemic dose as follows-   Antihyperglycemics (From admission, onward)      Start     Stop Route Frequency Ordered    03/03/25 2100  insulin glargine U-100 (Lantus) pen 5 Units         -- SubQ Nightly 03/03/25 0233    03/03/25 0227  insulin aspart U-100 pen 0-5 Units         -- SubQ Before meals & nightly PRN 03/03/25 0128        -Accuchecks AC/HS  -Diabetic/renal diet

## 2025-03-08 NOTE — SUBJECTIVE & OBJECTIVE
Interval History: Reported lower extremity neuropathy symptoms. Has prior diagnosis of peripheral neuropathy. B12 level was high.    Review of Systems   Constitutional:  Negative for chills and fever.   Respiratory:  Positive for shortness of breath.    Neurological:  Negative for seizures and syncope.     Objective:     Vital Signs (Most Recent):  Temp: 97.8 °F (36.6 °C) (03/08/25 1047)  Pulse: 95 (03/08/25 1502)  Resp: 16 (03/08/25 1047)  BP: (!) 124/58 (03/08/25 1047)  SpO2: 99 % (03/08/25 1047) Vital Signs (24h Range):  Temp:  [97.8 °F (36.6 °C)-98.4 °F (36.9 °C)] 97.8 °F (36.6 °C)  Pulse:  [] 95  Resp:  [16-18] 16  SpO2:  [96 %-100 %] 99 %  BP: (109-124)/(58-71) 124/58     Weight: 85.7 kg (189 lb)  Body mass index is 27.91 kg/m².  No intake or output data in the 24 hours ending 03/08/25 1611        Physical Exam  Vitals and nursing note reviewed.   Constitutional:       General: She is not in acute distress.     Appearance: She is well-developed. She is not diaphoretic.      Interventions: She is not intubated.Nasal cannula in place.   Pulmonary:      Effort: Pulmonary effort is normal. No accessory muscle usage or respiratory distress. She is not intubated.   Musculoskeletal:      Right lower leg: Edema present.      Left lower leg: Edema present.   Neurological:      Mental Status: She is alert and oriented to person, place, and time. Mental status is at baseline.      Motor: No seizure activity.   Psychiatric:         Behavior: Behavior is not agitated, aggressive or hyperactive. Behavior is cooperative.               Significant Labs: All pertinent labs within the past 24 hours have been reviewed.    Significant Imaging: I have reviewed all pertinent imaging results/findings within the past 24 hours.

## 2025-03-08 NOTE — ASSESSMENT & PLAN NOTE
Patient's blood pressure range in the last 24 hours was: BP  Min: 109/63  Max: 124/58.The patient's inpatient anti-hypertensive regimen is listed below:  Current Antihypertensives  isosorbide mononitrate 24 hr tablet 60 mg, Daily, Oral  metoprolol succinate (TOPROL-XL) 24 hr tablet 25 mg, Daily, Oral  hydrALAZINE tablet 50 mg, Every 8 hours, Oral  bumetanide tablet 4 mg, 2 times daily, Oral    Plan  - BP is controlled, no changes needed to their regimen    3/4- /58   Pulse 98. HD planned today.
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
## 2025-03-08 NOTE — ASSESSMENT & PLAN NOTE
On hemodialysis since 2/22/2025. Anuric since starting but was able to urinate 300 mL with 300 mg of IV furosemide but none since.    Baseline creatinine is unknown. Most recent creatinine and eGFR are listed below.  Recent Labs     03/05/25 2017 03/06/25  1503   CREATININE 1.1 1.5*   EGFRNORACEVR 52.7* 36.3*      Plan  - Dialysis per Nephrology.  - Consult Palliative Care for medication management of air hunger per Nephrology recommendation.

## 2025-03-08 NOTE — ASSESSMENT & PLAN NOTE
"Anemia is likely due to chronic disease due to Chronic Kidney Disease. Most recent hemoglobin and hematocrit are listed below.  No results for input(s): "HGB", "HCT" in the last 72 hours.    Plan  - Monitor serial CBC: Daily  - Transfuse PRBC if patient becomes hemodynamically unstable, symptomatic or H/H drops below 7/21.  - Patient has not received any PRBC transfusions to date  - Patient's anemia is currently stable  "

## 2025-03-08 NOTE — NURSING
Dialysis tx started to the right chest perm cath per orders placed by Dr. Capone:    Order Questions    Question Answer   Antibiotics on HD? No   Duration of Treatment 3 hours   Dialyzer F160   Dialysate Temperature (C) 37   Target  mL/min   If unable to maintain flow due to inadequate vascular access patency, patient intolerance (i.e. chest pain, access discomfort) or elevated venous pressure, adjust blood flow rate to a minimum of _____mL/min 100    mL/min   K+ Potassium per Protocol   Ca++ Calcium per Protocol   Na+ Sodium per Protocol   Bicarb Bicarbonate per Protocol   Access to be used Other (please specify)   Target UF 3L   If unable to maintain this UFR due to patient intolerance (i.e. hypotension, chest pain, muscle cramping, nausea or vomiting), adjust UFR to achieve a minimum of _______ liters of UF 0   Fluid Removal Instructions maintain SBP > 90 mmHG

## 2025-03-08 NOTE — HPI
HPI obtained from chart review:     As per H and P Mrs. Contreras is a 75 yo lady with pmh of DM2, fibromyalgia, lymphoma s/p chemo, HTN, HLD, CVA, MI, CAD s/p PCIs, HFpEF, anemia, KYA on CKD3b newly on HD (2/22/25), and recent admission at [facility] (2/15- 2/25/25) for CHB, KYA, and NSTEMI who presents to the emergency department with a chief complaint of worsening shortness of breath and chest tightness since discharge. Reports mild improvement of symptoms after HD lasting several hours. Endorses associated OSMAN, significant swelling to BLE and abdomen, orthopnea, wheezing, fatigue, and generalized weakness. Patient reports the chest tightness is non-radiating, substernal and is worse with MSK manipulation and deep breathing, but unaffected by exertion or positioning. Also reports diffuse pain to legs that she attributes to the swelling. She notes generalized abdominal discomfort, described as a dull, nagging pain. Does report nausea and several episodes of vomiting. States since initiation of HD she has been anuric. No missed HD treatments, last HD session 3/1/25. She denies fever/chills, cough, congestion, headache, or lightheadedness/dizziness.     In the ED, patient with tachypnea and mild tachycardia otherwise vitals stable, afebrile. No documented hypoxia, but placed on 2L O2 via NC for comfort. CBC with stable anemia and WBC 16.29. PT/INR WNL. Na 135. Bicarb 20. Cr 2.1 (bl prior to HD initiation ~2.0). Glucose 194. Calcium 8.6. Albumin 3.1. BNP 1,335. HS troponin 64>>57. EKG shows SR w/ bigeminy, 70 bpm, no ST elevation or depression. CXR with cardiomegaly with pulmonary edema and bilateral pleural effusions. Aeration is similar to mildly worse when compared with 02/19/2025. New right-sided central venous catheter overlying the SVC. Abd US with cholelithiasis and gallbladder sludge without evidence of cholecystitis. Distended gallbladder. Hepatomegaly. Bilateral pleural effusion. The patient received  tylenol and 100mg IV lasix.    Overview/Hospital Course:  Nephrology was consulted for dialysis. She urinated 300 mL after furosemide. She had not urinated since started dialysis. She was put on IV furosemide 80 mg twice daily. She takes oxycodone 10 mg at home and requested something stronger. Oxycodone was increased to her home dose. On 3/5/2025, furosemide was changed to 80 mg by mouth, which was ineffective. It was changed to bumetanide 4 mg, which was also ineffective. Nephrology recommended Palliative Care consult for medication management of air hunger.      Interval History: Bumetanide 4 mg PO ineffective. Only 300 mL of urine output total during this admission, made during the first 24 hours.     3/7/25 Pal med consulted for symptom management - dyspnea

## 2025-03-08 NOTE — SUBJECTIVE & OBJECTIVE
Interval History:     Past Medical History:   Diagnosis Date    Age-related osteoporosis with current pathological fracture with routine healing 11/13/2024    Allergy     Altered mental status 06/19/2022    DYSARTHRIA, SPASTIC MOVEMENTS & DIFFICULTY SWALLOWING    Anemia     Anxiety     Arthritis     C. difficile colitis 09/29/2024    Currently treated with po vancomycin      Cataract     both removed    Colon polyps     Coronary artery disease     Depression     Diabetes mellitus, type II     Disorder of kidney and ureter     Epilepsia partialis continua 04/28/2023    Fibromyalgia     Follicular lymphoma     GERD (gastroesophageal reflux disease)     HTN (hypertension)     Hyperlipidemia     MI (myocardial infarction) 03/2019    Personal history of colonic polyps     Restless leg syndrome     Stroke        Past Surgical History:   Procedure Laterality Date    A-V CARDIAC PACEMAKER INSERTION N/A 2/17/2025    Procedure: INSERTION, CARDIAC PACEMAKER, DUAL CHAMBER;  Surgeon: Karl Rico MD;  Location: [facility] CATH LAB;  Service: Cardiology;  Laterality: N/A;    APPLICATION OF WOUND VACUUM-ASSISTED CLOSURE DEVICE Right 9/25/2024    Procedure: APPLICATION, WOUND VAC;  Surgeon: Neto Davalos MD;  Location: [facility] OR [address] [place] [address];  Service: Orthopedics;  Laterality: Right;    COLONOSCOPY  11/07/2012    Colon polyp found; repeat in 5 years    COLONOSCOPY N/A 11/4/2024    Procedure: COLONOSCOPY;  Surgeon: David Castaneda MD;  Location: [facility] ([address] [place] [address]);  Service: Endoscopy;  Laterality: N/A;    DEBRIDEMENT OF LOCAL FLAP Right 9/25/2024    Procedure: DEBRIDEMENT, LOCAL FLAP;  Surgeon: Neto Davalos MD;  [facility];  Service: Orthopedics;  Laterality: Right;    ELBOW SURGERY Right 2015    dislocation repair     ESOPHAGOGASTRODUODENOSCOPY  11/07/2012    atrophic gastritis, H pylori testing negative    INCISION AND DRAINAGE FOOT Right 6/2/2021    Procedure: INCISION AND DRAINAGE, FOOT, bone  biopsy;  Surgeon: Quiana Penn DPM;  Location: [facility];  Service: Podiatry;  Laterality: Right;    IRRIGATION AND DEBRIDEMENT OF LOWER EXTREMITY Right 9/25/2024    Procedure: IRRIGATION AND DEBRIDEMENT, LOWER EXTREMITY; slider table, supine, bone foam, cysto tubing, 6L NS/dakins/peroxide, culture swabs;  Surgeon: Neto Davalos MD;  [facility];  Service: Orthopedics;  Laterality: Right;    KNEE SURGERY Bilateral 2015    scoped    LEFT HEART CATHETERIZATION Left 3/29/2019    Procedure: Left heart cath;  Surgeon: Bladimir Barbosa MD;  Location: [facility] CATH LAB;  Service: Cardiology;  Laterality: Left;    LEFT HEART CATHETERIZATION Left 11/18/2019    Procedure: Left heart cath;  Surgeon: Karl Rico MD;  Location: [facility] CATH LAB;  Service: Cardiology;  Laterality: Left;    LEFT HEART CATHETERIZATION Left 1/8/2020    Procedure: Left heart cath, right radial, noon start;  Surgeon: Christos Monreal MD;  Location: [facility] CATH LAB;  Service: Cardiology;  Laterality: Left;  RN Pre Op 1-6-20.  To be admitted 1-7-20 sor Aspirin Disensitation    LEFT HEART CATHETERIZATION Left 2/19/2025    Procedure: Left heart cath;  Surgeon: Karl Rico MD;  Location: [facility] CATH LAB;  Service: Cardiology;  Laterality: Left;    OPEN REDUCTION AND INTERNAL FIXATION (ORIF) OF FRACTURE OF ACETABULUM Right 8/16/2024    Procedure: ORIF, FRACTURE, ACETABULUM;  Surgeon: Neto Davalos MD;  [facility];  Service: Orthopedics;  Laterality: Right;  anterior and lateral pelvic incisions    OPEN REDUCTION AND INTERNAL FIXATION (ORIF) OF PILON FRACTURE Right 8/14/2024    Procedure: ORIF, FRACTURE, PILON;  Surgeon: Neto Davalos MD;  Location: [facility] OR 2ND FLR;  Service: Orthopedics;  Laterality: Right;    TONSILLECTOMY  1955    ULTRASOUND GUIDANCE  1/8/2020    Procedure: Ultrasound Guidance;  Surgeon: Christos Monreal MD;  Location: [facility] CATH LAB;  Service: Cardiology;;       Review of  patient's allergies indicates:   Allergen Reactions    Novolin 70/30 (semi-synthetic) Nausea And Vomiting     Severe vomiting on Relion 70/30    Sulfa (sulfonamide antibiotics) Anaphylaxis    Talwin [pentazocine lactate] Anaphylaxis    Victoza [liraglutide] Nausea And Vomiting    Glipizide Nausea Only    Codeine     Influenza virus vaccines Hives    Iodine and iodide containing products Hives    Levetiracetam Itching    Lyrica [pregabalin] Hallucinations    Neurontin [gabapentin]      Possible associated myoclonic jerk    Rituxan [rituximab] Hives    Zoloft [sertraline] Nausea And Vomiting       Medications:  Continuous Infusions:  Scheduled Meds:   aspirin  81 mg Oral Daily    atorvastatin  80 mg Oral QHS    bumetanide  4 mg Oral BID loop    FLUoxetine  40 mg Oral Daily    heparin (porcine)  5,000 Units Subcutaneous Q8H    hydrALAZINE  50 mg Oral Q8H    insulin glargine U-100 (Lantus)  5 Units Subcutaneous QHS    isosorbide mononitrate  60 mg Oral Daily    LORazepam  1 mg Oral Daily    metoprolol succinate  25 mg Oral Daily    multivitamin  1 tablet Oral Daily    mupirocin   Nasal BID    oxyCODONE  10 mg Oral Q12H    pantoprazole  40 mg Oral Daily    ticagrelor  60 mg Oral BID     PRN Meds:  Current Facility-Administered Medications:     acetaminophen, 650 mg, Oral, Q6H PRN    albuterol, 2 puff, Inhalation, Q6H PRN    bisacodyL, 10 mg, Rectal, Daily PRN    dextrose 50%, 12.5 g, Intravenous, PRN    dextrose 50%, 25 g, Intravenous, PRN    glucagon (human recombinant), 1 mg, Intramuscular, PRN    glucose, 16 g, Oral, PRN    glucose, 24 g, Oral, PRN    heparin (porcine), 1,000 Units, Intra-Catheter, PRN    hydrOXYzine HCL, 25 mg, Oral, TID PRN    insulin aspart U-100, 0-5 Units, Subcutaneous, QID (AC + HS) PRN    lactulose, 20 g, Oral, Q6H PRN    loperamide, 2 mg, Oral, QID PRN    melatonin, 6 mg, Oral, Nightly PRN    naloxone, 0.02 mg, Intravenous, PRN    ondansetron, 4 mg, Intravenous, Q8H PRN    oxyCODONE, 10 mg,  Oral, Q6H PRN    QUEtiapine, 25 mg, Oral, Nightly PRN    senna-docusate 8.6-50 mg, 1 tablet, Oral, Daily PRN    sodium chloride 0.9%, 10 mL, Intravenous, Q12H PRN    Family History       Problem Relation (Age of Onset)    Allergy (severe) Daughter    Cancer Mother, Father    Diabetes Sister    Heart disease Mother    Hypertension Sister    Lung cancer Brother    No Known Problems Daughter          Tobacco Use    Smoking status: Never    Smokeless tobacco: Never   Substance and Sexual Activity    Alcohol use: Not Currently    Drug use: Never    Sexual activity: Not Currently     Partners: Male       Review of Systems   Constitutional:  Positive for activity change and fatigue.   Respiratory:  Positive for chest tightness and shortness of breath.    Cardiovascular:  Positive for leg swelling.   Gastrointestinal:  Positive for abdominal distention and abdominal pain.   Genitourinary:         Anuric   Neurological:  Positive for weakness.   Psychiatric/Behavioral:  Negative for agitation and behavioral problems. The patient is nervous/anxious.      Objective:     Vital Signs (Most Recent):  Temp: 97.4 °F (36.3 °C) (03/07/25 1527)  Pulse: 100 (03/07/25 1527)  Resp: 17 (03/06/25 2153)  BP: 123/66 (03/07/25 1527)  SpO2: 98 % (03/07/25 1527) Vital Signs (24h Range):  Temp:  [96.9 °F (36.1 °C)-98.2 °F (36.8 °C)] 97.4 °F (36.3 °C)  Pulse:  [] 100  Resp:  [17] 17  SpO2:  [95 %-100 %] 98 %  BP: (107-149)/(58-66) 123/66     Weight: 85.7 kg (189 lb)  Body mass index is 27.91 kg/m².       Physical Exam  Vitals and nursing note reviewed.   Constitutional:       General: She is in acute distress.   Cardiovascular:      Rate and Rhythm: Normal rate and regular rhythm.   Pulmonary:      Effort: Pulmonary effort is normal.      Comments: Supplemental oxygen  Abdominal:      General: There is no distension.      Tenderness: There is no abdominal tenderness.   Genitourinary:     Comments: Anuric since starting HD  Musculoskeletal:       Cervical back: Normal range of motion.      Right lower leg: No edema.      Left lower leg: No edema.   Skin:     General: Skin is warm and dry.      Coloration: Skin is pale.   Neurological:      Mental Status: She is alert and oriented to person, place, and time.   Psychiatric:         Thought Content: Thought content normal.         Judgment: Judgment normal.      Comments: Appears anxious and tearful           Review of Symptoms      Symptom Assessment (ESAS 0-10 Scale)  Pain:  0  Dyspnea:  0  Anxiety:  0  Nausea:  0  Depression:  0  Anorexia:  0  Fatigue:  0  Insomnia:  0  Restlessness:  0  Agitation:  0     CAM / Delirium:  Negative  Constipation:  Negative  Diarrhea:  Negative    Anxiety:  Is nervous/anxious    Pain Assessment:  OME in 24 hours:  0  Location(s):      Performance Status:  80    Living Arrangements:  Lives alone    Psychosocial/Cultural:   See Palliative Psychosocial Note: Yes  , two adult children, was living alone until sister Rachel moved in to help her.  No longer able to work ,  enjoys being creative and crafting   **Primary  to Follow**  Palliative Care  Consult: Yes    Spiritual:  F - Marcia and Belief:  [employer]   I - Importance:  Is unable to attend Mass every week   C - Community:  Born and raised in [place], currently lives in [place]   A - Address in Care:  Requesting to receive [facility] [place] [place]  services offered and patient is amenable         Advance Care Planning   Advance Directives:   Living Will: No    LaPOST: No    Do Not Resuscitate Status: No    Medical Power of : No      Decision Making:  Patient answered questions  Goals of Care: The patient endorses that what is most important right now is to focus on remaining as independent as possible, symptom/pain control, extending life as long as possible, even it it means sacrificing quality, and curative/life-prolongation (regardless of treatment burdens)    Accordingly, we  "have decided that the best plan to meet the patient's goals includes continuing with treatment           Significant Labs: All pertinent labs within the past 24 hours have been reviewed.  CBC:   Recent Labs   Lab 03/05/25  0339   WBC 16.29*   HGB 7.9*   HCT 26.5*   MCV 93        BMP:  No results for input(s): "GLU", "NA", "K", "CL", "CO2", "BUN", "CREATININE", "CALCIUM", "MG" in the last 24 hours.  LFT:  Lab Results   Component Value Date    AST 25 03/05/2025    ALKPHOS 123 03/05/2025    BILITOT 0.4 03/05/2025     Albumin:   Albumin   Date Value Ref Range Status   03/06/2025 2.5 (L) 3.5 - 5.2 g/dL Final   02/27/2025 3.3 (L) 3.5 - 5.2 g/dL Final     Protein:   Total Protein   Date Value Ref Range Status   03/05/2025 6.0 6.0 - 8.4 g/dL Final     Lactic acid:   Lab Results   Component Value Date    LACTATE 0.7 03/03/2025    LACTATE 1.9 04/10/2023       Significant Imaging: I have reviewed all pertinent imaging results/findings within the past 24 hours.    "

## 2025-03-08 NOTE — PROGRESS NOTES
Pt remains admitted to [facility], follow up scheduled for 3/10/25

## 2025-03-08 NOTE — ASSESSMENT & PLAN NOTE
Previously diagnosed. Intermittent symptoms. B12 not low. Could be due to diabetes or prior chemotherapy. No need for further inpatient evaluation.

## 2025-03-08 NOTE — CONSULTS
[facility] - Med Surg  Palliative Medicine  Consult Note    Patient Name: Lorena Contreras  MRN: [id number]  Admission Date: 3/2/2025  Hospital Length of Stay: 4 days  Code Status: Full Code   Attending Provider: Jairon Morales MD  Consulting Provider: DENISE Yuan  Primary Care Physician: Jorge Paris MD  Principal Problem:Hypervolemia associated with renal insufficiency    Patient information was obtained from patient, relative(s), past medical records, and primary team.      Consults  Assessment/Plan:     Palliative Care  Palliative care encounter  Pal med consulted for pain and symptom management specifically dyspnea and air hunger in setting of heart failure s/p duel chamber cardiac pacemaker, hypervolemia and renal failure.  HD initiated with aggressive fluid removal.  At time of of this encounter Mrs. Contreras is awake, alert and oriented.  Resp. Effort appears unlabored and has supplemental oxygen in use.  Mrs. Contreras c/o mild to moderate generalized pain     Advance Care Planning    Pal med APRN and LCSW met at bedside with patient and sister.  Pal med introduced as special service which focuses on alignment of care with patient values and wishes.      Date: 03/07/2025  - No ACP documents have been received   Patient did not wish or was not able to name a surrogate decision maker or provide an Advance Care Plan.  - Mrs. Contreras is decisional and indicates her adult daughter Ani Acevedo [phone] and Brooke Hoffmann [phone] are legal next of kin for medical decisions should patient lose ability   - full code per primary team   - pal med has discussed the risks, benefits and survivability in setting of heart and renal failure.  Mrs. Contreras is not amenable to change in resuscitation status.    Advance Care Planning    Date: 03/07/2025    [facility]  - Patient and family participate in  voluntary conversation about advance care planning and we specifically addressed what  the goals of care would be moving forward.   -Mrs. Acevedo talked about how her life has drastically changed since the accident where she fractured her ankle.   -She reports  it is hard to believe she now has heart failure and renal failure.     -States she has not had conversations to discuss how her health problems are related or what the impact on her future is.  -When asked Mrs. Contreras states her quality of life has been  negatively impacted and it is important that she finds comfort from the shortness of breath.    -Mrs. Contreras states she is also experiencing  depression and anxiety.  Emotional support provided.    -Pal med explained strategies to treat the dyspnea with use of opiate pain medications and explained how these medications work.   -During this  comprehensive discussion of comfort interventions  pal med also  hospice introduced hospice as a comfort focused plan of care should this  if this become important in the future.   - Pal med explained differences in pal med and hospice care.     -With further exploration Mrs. Contreras states it is her wish to continue with HD and all levels of care and accepts the negative impact on her quality of life.    -Assured patient pal med will advocate for her wishes and offered outpatient follow up for continued symptom management   - Patient is  amenable to follow up in outpatient pal med clinic. Will facilitate referral.   -Accordingly, we have decided that the best plan to meet the patient's goals includes continuing with treatment    1:15 PM   Received phone call from patient's daughter Noelle for clarification of pal med discussion.   Daughter  indicates the patient has misperception of the above conversations stating pal med APRN told the patient that there was no hope for her.  Daughter seeking clarification as it was her understanding pal med was going to make the patient comfortable and treat the shortness of breath.     Pal med  acknowledged daughter's  concern and shared the above discussion with the patient's daughter.  Further explained the differences between pal med and hospice care. Also explained in  this situation making the patient comfortable is not the same as hospice comfort care.   Explained the use of opiate medications is treatment of choice for dyspnea.  Daughter states complete understanding and expressed gratitude for the clarification.    - emotional support provided       A total of 48 min was spent on advance care planning, goals of care discussion, emotional support, formulating and communicating prognosis and exploring burden/benefit of various approaches of treatment. This discussion occurred on a fully voluntary basis with the verbal consent of the patient and/or family.          Symptom Management   Dyspnea  - patient reports shortness of breath and air hunger continually   - appears fluid overloaded   - respiratory effort is unlabored   - supplemental oxygen is use - 2 L NC with sats 98 - 100%    Recommendations   - continue diuretics   - continue HD     Medical Management   Recommend   DISCONTINUE  oxycodone immediate release 10 mg by mouth every 12 hrs AND  START oxycodone extended release 10 mg by mouth every 12 hrs AND  START oxycodone immediate release 10 mg by mouth every 6 hrs as needed for breakthrough dyspnea   Pain  Reports  constant moderate generalized pain   - Oxycodone above will also treat pain   - please include pain and dyspnea in the indications     Anxiety   - continue hydroxyzine and lorazepam   - consider psych consult for anxiety and depression       Plan   - see above symptom management recommendations   - patient and family would benefit from continued information and education regarding clinical condition,  prognosis and what to expect in the future.  - at time of discharge please fill scripts [place] [facility] [place] had difficulty filling at her local pharmacy  - will  facilitate referral to outpatient [facility] med clinic    Dr. Morales notified of the above via secure chat message     Thank you for consult and opportunity to participate in Mrs. Contreras's care                Thank you for your consult. I will follow-up with patient. Please contact us if you have any additional questions.    Subjective:     HPI:   HPI obtained from chart review:     As per H and P Mrs. Contreras is a 73 yo lady with pmh of DM2, fibromyalgia, lymphoma s/p chemo, HTN, HLD, CVA, MI, CAD s/p PCIs, HFpEF, anemia, KYA on CKD3b newly on HD (2/22/25), and recent admission at [facility] (2/15- 2/25/25) for CHB, KYA, and NSTEMI who presents to the emergency department with a chief complaint of worsening shortness of breath and chest tightness since discharge. Reports mild improvement of symptoms after HD lasting several hours. Endorses associated OSMAN, significant swelling to BLE and abdomen, orthopnea, wheezing, fatigue, and generalized weakness. Patient reports the chest tightness is non-radiating, substernal and is worse with MSK manipulation and deep breathing, but unaffected by exertion or positioning. Also reports diffuse pain to legs that she attributes to the swelling. She notes generalized abdominal discomfort, described as a dull, nagging pain. Does report nausea and several episodes of vomiting. States since initiation of HD she has been anuric. No missed HD treatments, last HD session 3/1/25. She denies fever/chills, cough, congestion, headache, or lightheadedness/dizziness.     In the ED, patient with tachypnea and mild tachycardia otherwise vitals stable, afebrile. No documented hypoxia, but placed on 2L O2 via NC for comfort. CBC with stable anemia and WBC 16.29. PT/INR WNL. Na 135. Bicarb 20. Cr 2.1 (bl prior to HD initiation ~2.0). Glucose 194. Calcium 8.6. Albumin 3.1. BNP 1,335. HS troponin 64>>57. EKG shows SR w/ bigeminy, 70 bpm, no ST elevation or depression. CXR with cardiomegaly with  pulmonary edema and bilateral pleural effusions. Aeration is similar to mildly worse when compared with 02/19/2025. New right-sided central venous catheter overlying the SVC. Abd US with cholelithiasis and gallbladder sludge without evidence of cholecystitis. Distended gallbladder. Hepatomegaly. Bilateral pleural effusion. The patient received tylenol and 100mg IV lasix.    Overview/Hospital Course:  Nephrology was consulted for dialysis. She urinated 300 mL after furosemide. She had not urinated since started dialysis. She was put on IV furosemide 80 mg twice daily. She takes oxycodone 10 mg at home and requested something stronger. Oxycodone was increased to her home dose. On 3/5/2025, furosemide was changed to 80 mg by mouth, which was ineffective. It was changed to bumetanide 4 mg, which was also ineffective. Nephrology recommended Palliative Care consult for medication management of air hunger.      Interval History: Bumetanide 4 mg PO ineffective. Only 300 mL of urine output total during this admission, made during the first 24 hours.     3/7/25 Pal med consulted for symptom management - dyspnea                  Hospital Course:  No notes on file    Interval History:     Past Medical History:   Diagnosis Date    Age-related osteoporosis with current pathological fracture with routine healing 11/13/2024    Allergy     Altered mental status 06/19/2022    DYSARTHRIA, SPASTIC MOVEMENTS & DIFFICULTY SWALLOWING    Anemia     Anxiety     Arthritis     C. difficile colitis 09/29/2024    Currently treated with po vancomycin      Cataract     both removed    Colon polyps     Coronary artery disease     Depression     Diabetes mellitus, type II     Disorder of kidney and ureter     Epilepsia partialis continua 04/28/2023    Fibromyalgia     Follicular lymphoma     GERD (gastroesophageal reflux disease)     HTN (hypertension)     Hyperlipidemia     MI (myocardial infarction) 03/2019    Personal history of colonic polyps      Restless leg syndrome     Stroke        Past Surgical History:   Procedure Laterality Date    A-V CARDIAC PACEMAKER INSERTION N/A 2/17/2025    Procedure: INSERTION, CARDIAC PACEMAKER, DUAL CHAMBER;  Surgeon: Karl Rico MD;  Location: [facility] CATH LAB;  Service: Cardiology;  Laterality: N/A;    APPLICATION OF WOUND VACUUM-ASSISTED CLOSURE DEVICE Right 9/25/2024    Procedure: APPLICATION, WOUND VAC;  Surgeon: Neto Davalos MD;  [facility];  Service: Orthopedics;  Laterality: Right;    COLONOSCOPY  11/07/2012    Colon polyp found; repeat in 5 years    COLONOSCOPY N/A 11/4/2024    Procedure: COLONOSCOPY;  Surgeon: David Castaneda MD;  Location: [facility] (2ND FLR);  Service: Endoscopy;  Laterality: N/A;    DEBRIDEMENT OF LOCAL FLAP Right 9/25/2024    Procedure: DEBRIDEMENT, LOCAL FLAP;  Surgeon: Neto Davalos MD;  [facility];  Service: Orthopedics;  Laterality: Right;    ELBOW SURGERY Right 2015    dislocation repair     ESOPHAGOGASTRODUODENOSCOPY  11/07/2012    atrophic gastritis, H pylori testing negative    INCISION AND DRAINAGE FOOT Right 6/2/2021    Procedure: INCISION AND DRAINAGE, FOOT, bone biopsy;  Surgeon: Quiana Penn DPM;  Location: [facility] OR;  Service: Podiatry;  Laterality: Right;    IRRIGATION AND DEBRIDEMENT OF LOWER EXTREMITY Right 9/25/2024    Procedure: IRRIGATION AND DEBRIDEMENT, LOWER EXTREMITY; slider table, supine, bone foam, cysto tubing, 6L NS/dakins/peroxide, culture swabs;  Surgeon: Neto Davalos MD;  [facility];  Service: Orthopedics;  Laterality: Right;    KNEE SURGERY Bilateral 2015    scoped    LEFT HEART CATHETERIZATION Left 3/29/2019    Procedure: Left heart cath;  Surgeon: Bladimir Barbosa MD;  Location: [facility] CATH LAB;  Service: Cardiology;  Laterality: Left;    LEFT HEART CATHETERIZATION Left 11/18/2019    Procedure: Left heart cath;  Surgeon: Karl Rico MD;  Location: [facility] CATH LAB;   Service: Cardiology;  Laterality: Left;    LEFT HEART CATHETERIZATION Left 1/8/2020    Procedure: Left heart cath, right radial, noon start;  Surgeon: Christos Monreal MD;  Location: [facility] CATH LAB;  Service: Cardiology;  Laterality: Left;  RN Pre Op 1-6-20.  To be admitted 1-7-20 sor Aspirin Disensitation    LEFT HEART CATHETERIZATION Left 2/19/2025    Procedure: Left heart cath;  Surgeon: Karl Rico MD;  Location: [facility] CATH LAB;  Service: Cardiology;  Laterality: Left;    OPEN REDUCTION AND INTERNAL FIXATION (ORIF) OF FRACTURE OF ACETABULUM Right 8/16/2024    Procedure: ORIF, FRACTURE, ACETABULUM;  Surgeon: Neto Davalos MD;  Location: [facility] OR [address] [place] [address];  Service: Orthopedics;  Laterality: Right;  anterior and lateral pelvic incisions    OPEN REDUCTION AND INTERNAL FIXATION (ORIF) OF PILON FRACTURE Right 8/14/2024    Procedure: ORIF, FRACTURE, PILON;  Surgeon: Neto Davalos MD;  Location: [facility] OR [address] [place] [address];  Service: Orthopedics;  Laterality: Right;    TONSILLECTOMY  1955    ULTRASOUND GUIDANCE  1/8/2020    Procedure: Ultrasound Guidance;  Surgeon: Christos Monreal MD;  Location: [facility] CATH LAB;  Service: Cardiology;;       Review of patient's allergies indicates:   Allergen Reactions    Novolin 70/30 (semi-synthetic) Nausea And Vomiting     Severe vomiting on Relion 70/30    Sulfa (sulfonamide antibiotics) Anaphylaxis    Talwin [pentazocine lactate] Anaphylaxis    Victoza [liraglutide] Nausea And Vomiting    Glipizide Nausea Only    Codeine     Influenza virus vaccines Hives    Iodine and iodide containing products Hives    Levetiracetam Itching    Lyrica [pregabalin] Hallucinations    Neurontin [gabapentin]      Possible associated myoclonic jerk    Rituxan [rituximab] Hives    Zoloft [sertraline] Nausea And Vomiting       Medications:  Continuous Infusions:  Scheduled Meds:   aspirin  81 mg Oral Daily    atorvastatin  80 mg Oral QHS    bumetanide  4 mg Oral BID loop    FLUoxetine  40 mg  Oral Daily    heparin (porcine)  5,000 Units Subcutaneous Q8H    hydrALAZINE  50 mg Oral Q8H    insulin glargine U-100 (Lantus)  5 Units Subcutaneous QHS    isosorbide mononitrate  60 mg Oral Daily    LORazepam  1 mg Oral Daily    metoprolol succinate  25 mg Oral Daily    multivitamin  1 tablet Oral Daily    mupirocin   Nasal BID    oxyCODONE  10 mg Oral Q12H    pantoprazole  40 mg Oral Daily    ticagrelor  60 mg Oral BID     PRN Meds:  Current Facility-Administered Medications:     acetaminophen, 650 mg, Oral, Q6H PRN    albuterol, 2 puff, Inhalation, Q6H PRN    bisacodyL, 10 mg, Rectal, Daily PRN    dextrose 50%, 12.5 g, Intravenous, PRN    dextrose 50%, 25 g, Intravenous, PRN    glucagon (human recombinant), 1 mg, Intramuscular, PRN    glucose, 16 g, Oral, PRN    glucose, 24 g, Oral, PRN    heparin (porcine), 1,000 Units, Intra-Catheter, PRN    hydrOXYzine HCL, 25 mg, Oral, TID PRN    insulin aspart U-100, 0-5 Units, Subcutaneous, QID (AC + HS) PRN    lactulose, 20 g, Oral, Q6H PRN    loperamide, 2 mg, Oral, QID PRN    melatonin, 6 mg, Oral, Nightly PRN    naloxone, 0.02 mg, Intravenous, PRN    ondansetron, 4 mg, Intravenous, Q8H PRN    oxyCODONE, 10 mg, Oral, Q6H PRN    QUEtiapine, 25 mg, Oral, Nightly PRN    senna-docusate 8.6-50 mg, 1 tablet, Oral, Daily PRN    sodium chloride 0.9%, 10 mL, Intravenous, Q12H PRN    Family History       Problem Relation (Age of Onset)    Allergy (severe) Daughter    Cancer Mother, Father    Diabetes Sister    Heart disease Mother    Hypertension Sister    Lung cancer Brother    No Known Problems Daughter          Tobacco Use    Smoking status: Never    Smokeless tobacco: Never   Substance and Sexual Activity    Alcohol use: Not Currently    Drug use: Never    Sexual activity: Not Currently     Partners: Male       Review of Systems   Constitutional:  Positive for activity change and fatigue.   Respiratory:  Positive for chest tightness and shortness of breath.    Cardiovascular:   Positive for leg swelling.   Gastrointestinal:  Positive for abdominal distention and abdominal pain.   Genitourinary:         Anuric   Neurological:  Positive for weakness.   Psychiatric/Behavioral:  Negative for agitation and behavioral problems. The patient is nervous/anxious.      Objective:     Vital Signs (Most Recent):  Temp: 97.4 °F (36.3 °C) (03/07/25 1527)  Pulse: 100 (03/07/25 1527)  Resp: 17 (03/06/25 2153)  BP: 123/66 (03/07/25 1527)  SpO2: 98 % (03/07/25 1527) Vital Signs (24h Range):  Temp:  [96.9 °F (36.1 °C)-98.2 °F (36.8 °C)] 97.4 °F (36.3 °C)  Pulse:  [] 100  Resp:  [17] 17  SpO2:  [95 %-100 %] 98 %  BP: (107-149)/(58-66) 123/66     Weight: 85.7 kg (189 lb)  Body mass index is 27.91 kg/m².       Physical Exam  Vitals and nursing note reviewed.   Constitutional:       General: She is in acute distress.   Cardiovascular:      Rate and Rhythm: Normal rate and regular rhythm.   Pulmonary:      Effort: Pulmonary effort is normal.      Comments: Supplemental oxygen  Abdominal:      General: There is no distension.      Tenderness: There is no abdominal tenderness.   Genitourinary:     Comments: Anuric since starting HD  Musculoskeletal:      Cervical back: Normal range of motion.      Right lower leg: No edema.      Left lower leg: No edema.   Skin:     General: Skin is warm and dry.      Coloration: Skin is pale.   Neurological:      Mental Status: She is alert and oriented to person, place, and time.   Psychiatric:         Thought Content: Thought content normal.         Judgment: Judgment normal.      Comments: Appears anxious and tearful           Review of Symptoms      Symptom Assessment (ESAS 0-10 Scale)  Pain:  0  Dyspnea:  0  Anxiety:  0  Nausea:  0  Depression:  0  Anorexia:  0  Fatigue:  0  Insomnia:  0  Restlessness:  0  Agitation:  0     CAM / Delirium:  Negative  Constipation:  Negative  Diarrhea:  Negative    Anxiety:  Is nervous/anxious    Pain Assessment:  OME in 24 hours:   "0  Location(s):      Performance Status:  80    Living Arrangements:  Lives alone    Psychosocial/Cultural:   See Palliative Psychosocial Note: Yes  , two adult children, was living alone until sister Rachel moved in to help her.  No longer able to work ,  enjoys being creative and crafting   **Primary  to Follow**  Palliative Care  Consult: Yes    Spiritual:  F - Marcia and Belief:  Orthodoxy   I - Importance:  Is unable to attend Mass every week   C - Community:  Born and raised in [place], currently lives in [place]   A - Address in Care:  Requesting to receive [facility]  services offered and patient is amenable         Advance Care Planning   Advance Directives:   Living Will: No    LaPOST: No    Do Not Resuscitate Status: No    Medical Power of : No      Decision Making:  Patient answered questions  Goals of Care: The patient endorses that what is most important right now is to focus on remaining as independent as possible, symptom/pain control, extending life as long as possible, even it it means sacrificing quality, and curative/life-prolongation (regardless of treatment burdens)    Accordingly, we have decided that the best plan to meet the patient's goals includes continuing with treatment           Significant Labs: All pertinent labs within the past 24 hours have been reviewed.  CBC:   Recent Labs   Lab 03/05/25  0339   WBC 16.29*   HGB 7.9*   HCT 26.5*   MCV 93        BMP:  No results for input(s): "GLU", "NA", "K", "CL", "CO2", "BUN", "CREATININE", "CALCIUM", "MG" in the last 24 hours.  LFT:  Lab Results   Component Value Date    AST 25 03/05/2025    ALKPHOS 123 03/05/2025    BILITOT 0.4 03/05/2025     Albumin:   Albumin   Date Value Ref Range Status   03/06/2025 2.5 (L) 3.5 - 5.2 g/dL Final   02/27/2025 3.3 (L) 3.5 - 5.2 g/dL Final     Protein:   Total Protein   Date Value Ref Range Status   03/05/2025 6.0 6.0 - 8.4 g/dL Final     Lactic " acid:   Lab Results   Component Value Date    LACTATE 0.7 03/03/2025    LACTATE 1.9 04/10/2023       Significant Imaging: I have reviewed all pertinent imaging results/findings within the past 24 hours.        Nabila Daniel, CNS  Palliative Medicine  [facility]

## 2025-03-08 NOTE — PROGRESS NOTES
[facility] Medicine  Progress Note    Patient Name: Lorena Contreras  MRN: [id number]  Patient Class: IP- Inpatient   Admission Date: 3/2/2025  Length of Stay: 5 days  Attending Physician: Jairon Morales MD  Primary Care Provider: Jorge Paris MD        Subjective     Principal Problem:Hypervolemia associated with renal insufficiency        HPI:  Lorena Contreras is a 74 year old white woman with hypertension, hyperlipidemia, diabetes mellitus type 2, coronary artery disease, history of stroke, complete heart block status post dual chamber cardiac pacemaker placed 2/17/2025, pulmonary hypertension, chronic diastolic heart failure, peripheral vascular disease, chronic kidney disease stage 3b (on hemodialysis since 2/22/2025), anemia, follicular lymphoma diagnosed in 2009 treated with chemotherapy, gastroesophageal reflux disease, constipation, restless legs syndrome, fibromyalgia, anxiety, depression, history of right elbow dislocation repair in 2015, history of incision and drainage of right foot on 6/2/2021, history of open reduction and internal fixation of right pilon fracture on 8/14/2024 and right acetabulum fracture on 8/16/2024, history of right medial ankle fasciocutaneous flap and excisional debridement of left ankle medial wound on 9/25/2024, history of tonsillectomy in 1955. She lives in [place], [place]. She is . Her primary care physician is Dr. Jorge Paris.    She presented to [facility] Emergency Department on 3/2/2025 with worsening shortness of breath and chest tightness since being discharged from [facility] on 2/25/2025, where she was admitted on 2/15/2025 for complete heart block, acute kidney injury, and non-ST elevation myocardial infarction. She had mild improvement of symptoms after hemodialysis lasting several hours. She had associated dyspnea on exertion, significant swelling of bilateral lower extremities and  abdomen, orthopnea, wheezing, fatigue, and generalized weakness. Chest tightness was non-radiating, substernal, and worse with musculoskeletal manipulation and deep breathing, but unaffected by exertion and positioning. She had diffuse leg pain due to swelling, generalized abdominal discomfort described as dull, nagging pain, and nausea and vomiting. She has been anuric since starting dialysis. She had not missed any dialysis treatments.   In the emergency department, she had tachypnea and mild tachycardia. She was placed on 2 liters/minute of supplemental oxygen for comfort. Labs showed stable anemia and elevated WBC (56756/uL), bicarbonate 20 mmol/L, creatinine 2.1 mg/dL (baseline prior to starting dialysis was around 2.0), glucose 194 mg/dL, calcium 8.6 mg/dL, albumin 3.1 g/dL, BNP 1335 pg/mL, high sensitivity troponin 64 ng/L (repeat 57). EKG showed sinus rhythm with bigeminy, rate 70 bpm, no ST elevation or depression. Chest X-ray showed cardiomegaly with pulmonary edema and bilateral pleural effusions with aeration similar to mildly worse than 2/19/2025. Abdominal ultrasound showed cholelithiasis and gallbladder sludge without evidence of cholecystitis, distended gallbladder, hepatomegaly, bilateral pleural effusions. She was given acetaminophen and 100 mg of IV furosemide. She was admitted to Hospital Medicine Team S.    Overview/Hospital Course:  Nephrology was consulted for dialysis. She urinated 300 mL after furosemide. She had not urinated since started dialysis. She was put on IV furosemide 80 mg twice daily. She takes oxycodone 10 mg at home and requested something stronger. Oxycodone was increased to her home dose. On 3/5/2025, furosemide was changed to 80 mg by mouth, which was ineffective. It was changed to bumetanide 4 mg, which was also ineffective. Nephrology recommended Palliative Care consult for medication management of air hunger. On 3/7/2025 she reported numbness and tingling in her lower  extremities that is chronic and intermittent (she has a prior diagnosis of peripheral neuropathy on her chart).     Interval History: Reported lower extremity neuropathy symptoms. Has prior diagnosis of peripheral neuropathy. B12 level was high.    Review of Systems   Constitutional:  Negative for chills and fever.   Respiratory:  Positive for shortness of breath.    Neurological:  Negative for seizures and syncope.     Objective:     Vital Signs (Most Recent):  Temp: 97.8 °F (36.6 °C) (03/08/25 1047)  Pulse: 95 (03/08/25 1502)  Resp: 16 (03/08/25 1047)  BP: (!) 124/58 (03/08/25 1047)  SpO2: 99 % (03/08/25 1047) Vital Signs (24h Range):  Temp:  [97.8 °F (36.6 °C)-98.4 °F (36.9 °C)] 97.8 °F (36.6 °C)  Pulse:  [] 95  Resp:  [16-18] 16  SpO2:  [96 %-100 %] 99 %  BP: (109-124)/(58-71) 124/58     Weight: 85.7 kg (189 lb)  Body mass index is 27.91 kg/m².  No intake or output data in the 24 hours ending 03/08/25 1611        Physical Exam  Vitals and nursing note reviewed.   Constitutional:       General: She is not in acute distress.     Appearance: She is well-developed. She is not diaphoretic.      Interventions: She is not intubated.Nasal cannula in place.   Pulmonary:      Effort: Pulmonary effort is normal. No accessory muscle usage or respiratory distress. She is not intubated.   Musculoskeletal:      Right lower leg: Edema present.      Left lower leg: Edema present.   Neurological:      Mental Status: She is alert and oriented to person, place, and time. Mental status is at baseline.      Motor: No seizure activity.   Psychiatric:         Behavior: Behavior is not agitated, aggressive or hyperactive. Behavior is cooperative.               Significant Labs: All pertinent labs within the past 24 hours have been reviewed.    Significant Imaging: I have reviewed all pertinent imaging results/findings within the past 24 hours.      Assessment & Plan  Hypervolemia associated with renal insufficiency  Dialysis for  fluid removal. Started furosemide. Ineffective by mouth. Try bumetanide.   Anxiety  -Chronic issue.  -PRN hydroxyzine.  Primary hypertension  Patient's blood pressure range in the last 24 hours was: BP  Min: 109/63  Max: 124/58.The patient's inpatient anti-hypertensive regimen is listed below:  Current Antihypertensives  isosorbide mononitrate 24 hr tablet 60 mg, Daily, Oral  metoprolol succinate (TOPROL-XL) 24 hr tablet 25 mg, Daily, Oral  hydrALAZINE tablet 50 mg, Every 8 hours, Oral  bumetanide tablet 4 mg, 2 times daily, Oral    Plan  - BP is controlled, no changes needed to their regimen    3/4- /58   Pulse 98. HD planned today.   Follicular lymphoma  S/p chemo in 2010. In remission.   Mixed hyperlipidemia   Patient is chronically on statin.will continue for now. Monitor clinically. Last LDL was   Lab Results   Component Value Date    LDLCALC 105.6 01/14/2025      Coronary artery disease of native artery of native heart with stable angina pectoris  Continue home aspirin, atorvastatin, and ticagrelor.  Pulmonary hypertension -- echo 11/2019  Chronic.    Peripheral vascular disease in diabetes mellitus -- CLEMENT 05/2019  -Chronic issue.  -Continue home aspirin and atorvastatin.  Acute on chronic diastolic heart failure  Volume removal with dialysis. Oral furosemide and bumetanide were not effective. Monitor intake/output, electrolytes.  Type 2 diabetes mellitus with stage 3b chronic kidney disease, with long-term current use of insulin  Patient's FSGs are controlled on current hypoglycemics.   Last A1c reviewed-   Lab Results   Component Value Date    HGBA1C 6.6 (H) 02/27/2025     Most recent fingerstick glucose reviewed-   Recent Labs   Lab 03/07/25  2103 03/08/25  0833 03/08/25  1202   POCTGLUCOSE 188* 162* 214*     Current correctional scale  Low  Maintain anti-hyperglycemic dose as follows-   Antihyperglycemics (From admission, onward)      Start     Stop Route Frequency Ordered    03/03/25 2100  insulin  "glargine U-100 (Lantus) pen 5 Units         -- SubQ Nightly 03/03/25 0233    03/03/25 0227  insulin aspart U-100 pen 0-5 Units         -- SubQ Before meals & nightly PRN 03/03/25 0128        -Accuchecks AC/HS  -Diabetic/renal diet  Aortic stenosis  New finding. Contributes to heart failure.  Constipation  Bisacodyl, senna-docusate, and lactulose prn.  Anemia  Anemia is likely due to chronic disease due to Chronic Kidney Disease. Most recent hemoglobin and hematocrit are listed below.  No results for input(s): "HGB", "HCT" in the last 72 hours.    Plan  - Monitor serial CBC: Daily  - Transfuse PRBC if patient becomes hemodynamically unstable, symptomatic or H/H drops below 7/21.  - Patient has not received any PRBC transfusions to date  - Patient's anemia is currently stable  CHB (complete heart block)  S/p pacemaker    Acute kidney injury superimposed on stage 3b chronic kidney disease  On hemodialysis since 2/22/2025. Anuric since starting but was able to urinate 300 mL with 300 mg of IV furosemide but none since.    Baseline creatinine is unknown. Most recent creatinine and eGFR are listed below.  Recent Labs     03/05/25 2017 03/06/25  1503   CREATININE 1.1 1.5*   EGFRNORACEVR 52.7* 36.3*      Plan  - Dialysis per Nephrology.  - Consult Palliative Care for medication management of air hunger per Nephrology recommendation.  Cardiac pacemaker in situ  2/17/2025. No acute issues.  GERD (gastroesophageal reflux disease)  Continue home pantoprazole.  Peripheral neuropathy  Previously diagnosed. Intermittent symptoms. B12 not low. Could be due to diabetes or prior chemotherapy. No need for further inpatient evaluation.    Palliative care encounter      Shortness of breath      Advanced care planning/counseling discussion      VTE Risk Mitigation (From admission, onward)           Ordered     heparin (porcine) injection 1,000 Units  As needed (PRN)         03/04/25 1302     heparin (porcine) injection 5,000 Units  " Every 8 hours         03/03/25 0128     IP VTE HIGH RISK PATIENT  Once         03/03/25 0128     Place sequential compression device  Until discontinued         03/03/25 0128                    Discharge Planning   MIKHAIL: 3/8/2025     Code Status: Full Code   Medical Readiness for Discharge Date:   Discharge Plan A: Home, Home with family, Home Health   Discharge Delays: None known at this time                    Jairon Morales MD  Department of Hospital Medicine   [facility]

## 2025-03-09 LAB
POCT GLUCOSE: 179 MG/DL (ref 70–110)
POCT GLUCOSE: 184 MG/DL (ref 70–110)
POCT GLUCOSE: 252 MG/DL (ref 70–110)
POCT GLUCOSE: 280 MG/DL (ref 70–110)

## 2025-03-09 PROCEDURE — 99900035 HC TECH TIME PER 15 MIN (STAT): Mod: HCNC

## 2025-03-09 PROCEDURE — 63600175 PHARM REV CODE 636 W HCPCS: Mod: HCNC | Performed by: NURSE PRACTITIONER

## 2025-03-09 PROCEDURE — 25000003 PHARM REV CODE 250: Mod: HCNC | Performed by: HOSPITALIST

## 2025-03-09 PROCEDURE — 21400001 HC TELEMETRY ROOM: Mod: HCNC

## 2025-03-09 PROCEDURE — 25000242 PHARM REV CODE 250 ALT 637 W/ HCPCS: Mod: HCNC | Performed by: NURSE PRACTITIONER

## 2025-03-09 PROCEDURE — 25000003 PHARM REV CODE 250: Mod: HCNC | Performed by: NURSE PRACTITIONER

## 2025-03-09 PROCEDURE — 27000221 HC OXYGEN, UP TO 24 HOURS: Mod: HCNC

## 2025-03-09 RX ADMIN — HYDRALAZINE HYDROCHLORIDE 50 MG: 25 TABLET ORAL at 02:03

## 2025-03-09 RX ADMIN — BUMETANIDE 4 MG: 1 TABLET ORAL at 05:03

## 2025-03-09 RX ADMIN — LORAZEPAM 1 MG: 1 TABLET ORAL at 08:03

## 2025-03-09 RX ADMIN — FLUOXETINE HYDROCHLORIDE 40 MG: 20 CAPSULE ORAL at 08:03

## 2025-03-09 RX ADMIN — ISOSORBIDE MONONITRATE 60 MG: 60 TABLET, EXTENDED RELEASE ORAL at 08:03

## 2025-03-09 RX ADMIN — HEPARIN SODIUM 5000 UNITS: 5000 INJECTION INTRAVENOUS; SUBCUTANEOUS at 06:03

## 2025-03-09 RX ADMIN — TICAGRELOR 60 MG: 60 TABLET ORAL at 10:03

## 2025-03-09 RX ADMIN — HYDRALAZINE HYDROCHLORIDE 50 MG: 25 TABLET ORAL at 10:03

## 2025-03-09 RX ADMIN — OXYCODONE HYDROCHLORIDE 10 MG: 10 TABLET, FILM COATED, EXTENDED RELEASE ORAL at 10:03

## 2025-03-09 RX ADMIN — HEPARIN SODIUM 5000 UNITS: 5000 INJECTION INTRAVENOUS; SUBCUTANEOUS at 10:03

## 2025-03-09 RX ADMIN — BUMETANIDE 4 MG: 1 TABLET ORAL at 08:03

## 2025-03-09 RX ADMIN — OXYCODONE HYDROCHLORIDE 10 MG: 10 TABLET, FILM COATED, EXTENDED RELEASE ORAL at 08:03

## 2025-03-09 RX ADMIN — METOPROLOL SUCCINATE 25 MG: 25 TABLET, EXTENDED RELEASE ORAL at 08:03

## 2025-03-09 RX ADMIN — INSULIN ASPART 3 UNITS: 100 INJECTION, SOLUTION INTRAVENOUS; SUBCUTANEOUS at 05:03

## 2025-03-09 RX ADMIN — MUPIROCIN: 20 OINTMENT TOPICAL at 10:03

## 2025-03-09 RX ADMIN — TICAGRELOR 60 MG: 60 TABLET ORAL at 08:03

## 2025-03-09 RX ADMIN — PANTOPRAZOLE SODIUM 40 MG: 40 TABLET, DELAYED RELEASE ORAL at 08:03

## 2025-03-09 RX ADMIN — HEPARIN SODIUM 5000 UNITS: 5000 INJECTION INTRAVENOUS; SUBCUTANEOUS at 02:03

## 2025-03-09 RX ADMIN — MUPIROCIN: 20 OINTMENT TOPICAL at 08:03

## 2025-03-09 RX ADMIN — ATORVASTATIN CALCIUM 80 MG: 40 TABLET, FILM COATED ORAL at 10:03

## 2025-03-09 RX ADMIN — THERA TABS 1 TABLET: TAB at 08:03

## 2025-03-09 RX ADMIN — INSULIN GLARGINE 5 UNITS: 100 INJECTION, SOLUTION SUBCUTANEOUS at 10:03

## 2025-03-09 RX ADMIN — HYDRALAZINE HYDROCHLORIDE 50 MG: 25 TABLET ORAL at 06:03

## 2025-03-09 RX ADMIN — ASPIRIN 81 MG CHEWABLE TABLET 81 MG: 81 TABLET CHEWABLE at 08:03

## 2025-03-09 NOTE — RESPIRATORY THERAPY
I got a call saying patient was complaining she couldn't breath and Sob when breathing through her mouth. I put pt on a venti mask and told md and rn what I did. She was previously on 2 LPM NC I placed her on a venti mask of 28% fio2 to roughly match what she was on prior. I told md and rn if they have any questions please let me know.

## 2025-03-09 NOTE — SUBJECTIVE & OBJECTIVE
Interval History: Just here for dialysis to treat pulmonary edema/pleural effusions as much as possible before discharge. Still requiring supplemental oxygen. Will assess need for it at discharge.     Review of Systems   Constitutional:  Negative for chills and fever.   Respiratory:  Positive for shortness of breath.    Neurological:  Negative for seizures and syncope.     Objective:     Vital Signs (Most Recent):  Temp: 98.2 °F (36.8 °C) (03/09/25 0721)  Pulse: 102 (03/09/25 0721)  Resp: 16 (03/09/25 0812)  BP: 101/63 (03/09/25 0721)  SpO2: 99 % (03/09/25 0721) Vital Signs (24h Range):  Temp:  [97.7 °F (36.5 °C)-98.4 °F (36.9 °C)] 98.2 °F (36.8 °C)  Pulse:  [] 102  Resp:  [16-18] 16  SpO2:  [98 %-99 %] 99 %  BP: ()/(53-65) 101/63     Weight: 85.7 kg (189 lb)  Body mass index is 27.91 kg/m².    Intake/Output Summary (Last 24 hours) at 3/9/2025 0925  Last data filed at 3/8/2025 1857  Gross per 24 hour   Intake 500 ml   Output 3055 ml   Net -2555 ml           Physical Exam  Vitals and nursing note reviewed.   Constitutional:       General: She is not in acute distress.     Appearance: She is well-developed. She is not diaphoretic.      Interventions: She is not intubated.Nasal cannula in place.   Pulmonary:      Effort: Pulmonary effort is normal. No accessory muscle usage or respiratory distress. She is not intubated.   Musculoskeletal:      Right lower leg: Edema present.      Left lower leg: Edema present.   Neurological:      Mental Status: She is alert and oriented to person, place, and time. Mental status is at baseline.      Motor: No seizure activity.   Psychiatric:         Behavior: Behavior is not agitated, aggressive or hyperactive. Behavior is cooperative.               Significant Labs: All pertinent labs within the past 24 hours have been reviewed.    Significant Imaging: I have reviewed all pertinent imaging results/findings within the past 24 hours.

## 2025-03-09 NOTE — ASSESSMENT & PLAN NOTE
Creatine stable for now. BMP reviewed- noted Estimated Creatinine Clearance: 30.3 mL/min (A) (based on SCr of 1.9 mg/dL (H)). according to latest data. Based on current GFR, CKD stage is end stage.  Monitor UOP and serial BMP and adjust therapy as needed. Renally dose meds. Avoid nephrotoxic medications and procedures.

## 2025-03-09 NOTE — ASSESSMENT & PLAN NOTE
Patient's FSGs are controlled on current hypoglycemics.   Last A1c reviewed-   Lab Results   Component Value Date    HGBA1C 6.6 (H) 02/27/2025     Most recent fingerstick glucose reviewed-   Recent Labs   Lab 03/08/25  1202 03/08/25  1752 03/08/25  2039 03/09/25  0810   POCTGLUCOSE 214* 129* 134* 184*     Current correctional scale  Low  Maintain anti-hyperglycemic dose as follows-   Antihyperglycemics (From admission, onward)      Start     Stop Route Frequency Ordered    03/03/25 2100  insulin glargine U-100 (Lantus) pen 5 Units         -- SubQ Nightly 03/03/25 0233    03/03/25 0227  insulin aspart U-100 pen 0-5 Units         -- SubQ Before meals & nightly PRN 03/03/25 0128        -Accuchecks AC/HS  -Diabetic/renal diet

## 2025-03-09 NOTE — ASSESSMENT & PLAN NOTE
Patient's blood pressure range in the last 24 hours was: BP  Min: 99/56  Max: 147/63.The patient's inpatient anti-hypertensive regimen is listed below:  Current Antihypertensives  isosorbide mononitrate 24 hr tablet 60 mg, Daily, Oral  metoprolol succinate (TOPROL-XL) 24 hr tablet 25 mg, Daily, Oral  hydrALAZINE tablet 50 mg, Every 8 hours, Oral  bumetanide tablet 4 mg, 2 times daily, Oral    Plan  - BP is controlled, no changes needed to their regimen    3/4- /58   Pulse 98. HD planned today.

## 2025-03-09 NOTE — PLAN OF CARE
Problem: Hemodialysis  Goal: Safe, Effective Therapy Delivery  Outcome: Progressing  Goal: Effective Tissue Perfusion  Outcome: Progressing  Goal: Absence of Infection Signs and Symptoms  Outcome: Progressing     Dialysis tx ended to the right chest perm cath, saline locked, capped.    Net fluid removed: 2555 ml    Report given to nurse Morales. Pt awaiting transport from [facility] to room 610 by stretcher.

## 2025-03-09 NOTE — PROGRESS NOTES
[facility] Medicine  Progress Note    Patient Name: Lorena Contreras  MRN: [id number]  Patient Class: IP- Inpatient   Admission Date: 3/2/2025  Length of Stay: 6 days  Attending Physician: Jairon Morales MD  Primary Care Provider: Jorge Paris MD        Subjective     Principal Problem:Hypervolemia associated with renal insufficiency        HPI:  Lorena Contreras is a 74 year old white woman with hypertension, hyperlipidemia, diabetes mellitus type 2, coronary artery disease, history of stroke, complete heart block status post dual chamber cardiac pacemaker placed 2/17/2025, pulmonary hypertension, chronic diastolic heart failure, peripheral vascular disease, chronic kidney disease stage 3b (on hemodialysis since 2/22/2025), anemia, follicular lymphoma diagnosed in 2009 treated with chemotherapy, gastroesophageal reflux disease, constipation, restless legs syndrome, fibromyalgia, anxiety, depression, history of right elbow dislocation repair in 2015, history of incision and drainage of right foot on 6/2/2021, history of open reduction and internal fixation of right pilon fracture on 8/14/2024 and right acetabulum fracture on 8/16/2024, history of right medial ankle fasciocutaneous flap and excisional debridement of left ankle medial wound on 9/25/2024, history of tonsillectomy in 1955. She lives in [place], [place]. She is . Her primary care physician is Dr. Jorge Paris.    She presented to [facility] Emergency Department on 3/2/2025 with worsening shortness of breath and chest tightness since being discharged from [facility] on 2/25/2025, where she was admitted on 2/15/2025 for complete heart block, acute kidney injury, and non-ST elevation myocardial infarction. She had mild improvement of symptoms after hemodialysis lasting several hours. She had associated dyspnea on exertion, significant swelling of bilateral lower extremities and  abdomen, orthopnea, wheezing, fatigue, and generalized weakness. Chest tightness was non-radiating, substernal, and worse with musculoskeletal manipulation and deep breathing, but unaffected by exertion and positioning. She had diffuse leg pain due to swelling, generalized abdominal discomfort described as dull, nagging pain, and nausea and vomiting. She has been anuric since starting dialysis. She had not missed any dialysis treatments.   In the emergency department, she had tachypnea and mild tachycardia. She was placed on 2 liters/minute of supplemental oxygen for comfort. Labs showed stable anemia and elevated WBC (29215/uL), bicarbonate 20 mmol/L, creatinine 2.1 mg/dL (baseline prior to starting dialysis was around 2.0), glucose 194 mg/dL, calcium 8.6 mg/dL, albumin 3.1 g/dL, BNP 1335 pg/mL, high sensitivity troponin 64 ng/L (repeat 57). EKG showed sinus rhythm with bigeminy, rate 70 bpm, no ST elevation or depression. Chest X-ray showed cardiomegaly with pulmonary edema and bilateral pleural effusions with aeration similar to mildly worse than 2/19/2025. Abdominal ultrasound showed cholelithiasis and gallbladder sludge without evidence of cholecystitis, distended gallbladder, hepatomegaly, bilateral pleural effusions. She was given acetaminophen and 100 mg of IV furosemide. She was admitted to Hospital Medicine Team S.    Overview/Hospital Course:  Nephrology was consulted for dialysis. She urinated 300 mL after furosemide. She had not urinated since started dialysis. She was put on IV furosemide 80 mg twice daily. She takes oxycodone 10 mg at home and requested something stronger. Oxycodone was increased to her home dose. On 3/5/2025, furosemide was changed to 80 mg by mouth, which was ineffective. It was changed to bumetanide 4 mg, which was also ineffective. Nephrology recommended Palliative Care consult for medication management of air hunger. On 3/7/2025 she reported numbness and tingling in her lower  extremities that is chronic and intermittent (she has a prior diagnosis of peripheral neuropathy on her chart since 2015). Vitamin B12 level was high. Further evaluation of this can continue outpatient since she has had the problem for at least 10 years. By 3/9/2025, her net fluid status since admission was negative 10.878 liters.     Interval History: Just here for dialysis to treat pulmonary edema/pleural effusions as much as possible before discharge. Still requiring supplemental oxygen. Will assess need for it at discharge.     Review of Systems   Constitutional:  Negative for chills and fever.   Respiratory:  Positive for shortness of breath.    Neurological:  Negative for seizures and syncope.     Objective:     Vital Signs (Most Recent):  Temp: 98.2 °F (36.8 °C) (03/09/25 0721)  Pulse: 102 (03/09/25 0721)  Resp: 16 (03/09/25 0812)  BP: 101/63 (03/09/25 0721)  SpO2: 99 % (03/09/25 0721) Vital Signs (24h Range):  Temp:  [97.7 °F (36.5 °C)-98.4 °F (36.9 °C)] 98.2 °F (36.8 °C)  Pulse:  [] 102  Resp:  [16-18] 16  SpO2:  [98 %-99 %] 99 %  BP: ()/(53-65) 101/63     Weight: 85.7 kg (189 lb)  Body mass index is 27.91 kg/m².    Intake/Output Summary (Last 24 hours) at 3/9/2025 0925  Last data filed at 3/8/2025 1857  Gross per 24 hour   Intake 500 ml   Output 3055 ml   Net -2555 ml           Physical Exam  Vitals and nursing note reviewed.   Constitutional:       General: She is not in acute distress.     Appearance: She is well-developed. She is not diaphoretic.      Interventions: She is not intubated.Nasal cannula in place.   Pulmonary:      Effort: Pulmonary effort is normal. No accessory muscle usage or respiratory distress. She is not intubated.   Musculoskeletal:      Right lower leg: Edema present.      Left lower leg: Edema present.   Neurological:      Mental Status: She is alert and oriented to person, place, and time. Mental status is at baseline.      Motor: No seizure activity.   Psychiatric:          Behavior: Behavior is not agitated, aggressive or hyperactive. Behavior is cooperative.               Significant Labs: All pertinent labs within the past 24 hours have been reviewed.    Significant Imaging: I have reviewed all pertinent imaging results/findings within the past 24 hours.      Assessment & Plan  Hypervolemia associated with renal insufficiency  Dialysis for fluid removal. Furosemide and bumetanide were ineffective.  Anxiety  -Chronic issue.  -PRN hydroxyzine.  Primary hypertension  Patient's blood pressure range in the last 24 hours was: BP  Min: 99/56  Max: 147/63.The patient's inpatient anti-hypertensive regimen is listed below:  Current Antihypertensives  isosorbide mononitrate 24 hr tablet 60 mg, Daily, Oral  metoprolol succinate (TOPROL-XL) 24 hr tablet 25 mg, Daily, Oral  hydrALAZINE tablet 50 mg, Every 8 hours, Oral  bumetanide tablet 4 mg, 2 times daily, Oral    Plan  - BP is controlled, no changes needed to their regimen    3/4- /58   Pulse 98. HD planned today.   Follicular lymphoma  S/p chemo in 2010. In remission.   Mixed hyperlipidemia   Patient is chronically on statin.will continue for now. Monitor clinically. Last LDL was   Lab Results   Component Value Date    LDLCALC 105.6 01/14/2025      Coronary artery disease of native artery of native heart with stable angina pectoris  Continue home aspirin, atorvastatin, and ticagrelor.  Pulmonary hypertension -- echo 11/2019  Chronic.    Peripheral vascular disease in diabetes mellitus -- CLEMENT 05/2019  -Chronic issue.  -Continue home aspirin and atorvastatin.  Acute on chronic diastolic heart failure  Volume removal with dialysis. Oral furosemide and bumetanide were not effective. Monitor intake/output, electrolytes.  Type 2 diabetes mellitus with stage 3b chronic kidney disease, with long-term current use of insulin  Patient's FSGs are controlled on current hypoglycemics.   Last A1c reviewed-   Lab Results   Component Value Date  "   HGBA1C 6.6 (H) 02/27/2025     Most recent fingerstick glucose reviewed-   Recent Labs   Lab 03/08/25  1202 03/08/25  1752 03/08/25  2039 03/09/25  0810   POCTGLUCOSE 214* 129* 134* 184*     Current correctional scale  Low  Maintain anti-hyperglycemic dose as follows-   Antihyperglycemics (From admission, onward)      Start     Stop Route Frequency Ordered    03/03/25 2100  insulin glargine U-100 (Lantus) pen 5 Units         -- SubQ Nightly 03/03/25 0233    03/03/25 0227  insulin aspart U-100 pen 0-5 Units         -- SubQ Before meals & nightly PRN 03/03/25 0128        -Accuchecks AC/HS  -Diabetic/renal diet  Aortic stenosis  New finding. Contributes to heart failure.  Constipation  Bisacodyl, senna-docusate, and lactulose prn.  Anemia  Anemia is likely due to chronic disease due to Chronic Kidney Disease. Most recent hemoglobin and hematocrit are listed below.  No results for input(s): "HGB", "HCT" in the last 72 hours.    Plan  - Monitor serial CBC: Daily  - Transfuse PRBC if patient becomes hemodynamically unstable, symptomatic or H/H drops below 7/21.  - Patient has not received any PRBC transfusions to date  - Patient's anemia is currently stable  CHB (complete heart block)  S/p pacemaker    Acute kidney injury superimposed on stage 3b chronic kidney disease  On hemodialysis since 2/22/2025. Anuric since starting but was able to urinate 300 mL with 300 mg of IV furosemide but none since.    Baseline creatinine is unknown. Most recent creatinine and eGFR are listed below.  Recent Labs     03/06/25  1503 03/08/25  1619   CREATININE 1.5* 1.9*   EGFRNORACEVR 36.3* 27.4*      Plan  - Dialysis per Nephrology.  - Appreciate Palliative Care for medication management of air hunger per Nephrology recommendation.  Cardiac pacemaker in situ  2/17/2025. No acute issues.  GERD (gastroesophageal reflux disease)  Continue home pantoprazole.  Peripheral neuropathy  Previously diagnosed. Intermittent symptoms. B12 not low. " Could be due to diabetes or prior chemotherapy. No need for further inpatient evaluation.    Palliative care encounter      Shortness of breath  Due to fluid overload. Assess for need for home oxygen at discharge.    Advanced care planning/counseling discussion      ESRD (end stage renal disease)  Creatine stable for now. BMP reviewed- noted Estimated Creatinine Clearance: 30.3 mL/min (A) (based on SCr of 1.9 mg/dL (H)). according to latest data. Based on current GFR, CKD stage is end stage.  Monitor UOP and serial BMP and adjust therapy as needed. Renally dose meds. Avoid nephrotoxic medications and procedures.  VTE Risk Mitigation (From admission, onward)           Ordered     heparin (porcine) injection 1,000 Units  As needed (PRN)         03/04/25 1302     heparin (porcine) injection 5,000 Units  Every 8 hours         03/03/25 0128     IP VTE HIGH RISK PATIENT  Once         03/03/25 0128     Place sequential compression device  Until discontinued         03/03/25 0128                    Discharge Planning   MIKHAIL: 3/8/2025     Code Status: Full Code   Medical Readiness for Discharge Date:   Discharge Plan A: Home, Home with family, Home Health   Discharge Delays: None known at this time                    Jairon Morales MD  Department of Hospital Medicine   [facility]

## 2025-03-09 NOTE — ASSESSMENT & PLAN NOTE
On hemodialysis since 2/22/2025. Anuric since starting but was able to urinate 300 mL with 300 mg of IV furosemide but none since.    Baseline creatinine is unknown. Most recent creatinine and eGFR are listed below.  Recent Labs     03/06/25  1503 03/08/25  1619   CREATININE 1.5* 1.9*   EGFRNORACEVR 36.3* 27.4*      Plan  - Dialysis per Nephrology.  - Appreciate Palliative Care for medication management of air hunger per Nephrology recommendation.

## 2025-03-10 ENCOUNTER — PATIENT OUTREACH (OUTPATIENT)
Facility: OTHER | Age: 75
End: 2025-03-10
Payer: MEDICARE

## 2025-03-10 LAB
ALBUMIN SERPL BCP-MCNC: 2.2 G/DL (ref 3.5–5.2)
ALP SERPL-CCNC: 123 U/L (ref 40–150)
ALT SERPL W/O P-5'-P-CCNC: 11 U/L (ref 10–44)
ANION GAP SERPL CALC-SCNC: 5 MMOL/L (ref 8–16)
AST SERPL-CCNC: 23 U/L (ref 10–40)
BASOPHILS # BLD AUTO: 0.07 K/UL (ref 0–0.2)
BASOPHILS NFR BLD: 0.7 % (ref 0–1.9)
BILIRUB SERPL-MCNC: 0.4 MG/DL (ref 0.1–1)
BUN SERPL-MCNC: 21 MG/DL (ref 8–23)
CALCIUM SERPL-MCNC: 8.2 MG/DL (ref 8.7–10.5)
CHLORIDE SERPL-SCNC: 105 MMOL/L (ref 95–110)
CO2 SERPL-SCNC: 25 MMOL/L (ref 23–29)
CREAT SERPL-MCNC: 2.3 MG/DL (ref 0.5–1.4)
DIFFERENTIAL METHOD BLD: ABNORMAL
EOSINOPHIL # BLD AUTO: 0.1 K/UL (ref 0–0.5)
EOSINOPHIL NFR BLD: 1.3 % (ref 0–8)
ERYTHROCYTE [DISTWIDTH] IN BLOOD BY AUTOMATED COUNT: 15.3 % (ref 11.5–14.5)
EST. GFR  (NO RACE VARIABLE): 21.8 ML/MIN/1.73 M^2
GLUCOSE SERPL-MCNC: 159 MG/DL (ref 70–110)
HCT VFR BLD AUTO: 25.6 % (ref 37–48.5)
HGB BLD-MCNC: 7.9 G/DL (ref 12–16)
IMM GRANULOCYTES # BLD AUTO: 0.07 K/UL (ref 0–0.04)
IMM GRANULOCYTES NFR BLD AUTO: 0.7 % (ref 0–0.5)
LYMPHOCYTES # BLD AUTO: 1 K/UL (ref 1–4.8)
LYMPHOCYTES NFR BLD: 10.1 % (ref 18–48)
MCH RBC QN AUTO: 28.3 PG (ref 27–31)
MCHC RBC AUTO-ENTMCNC: 30.9 G/DL (ref 32–36)
MCV RBC AUTO: 92 FL (ref 82–98)
MONOCYTES # BLD AUTO: 0.8 K/UL (ref 0.3–1)
MONOCYTES NFR BLD: 8 % (ref 4–15)
NEUTROPHILS # BLD AUTO: 8.1 K/UL (ref 1.8–7.7)
NEUTROPHILS NFR BLD: 79.2 % (ref 38–73)
NRBC BLD-RTO: 0 /100 WBC
PLATELET # BLD AUTO: 228 K/UL (ref 150–450)
PMV BLD AUTO: 11.6 FL (ref 9.2–12.9)
POCT GLUCOSE: 147 MG/DL (ref 70–110)
POCT GLUCOSE: 170 MG/DL (ref 70–110)
POCT GLUCOSE: 175 MG/DL (ref 70–110)
POTASSIUM SERPL-SCNC: 3.5 MMOL/L (ref 3.5–5.1)
PROT SERPL-MCNC: 5.8 G/DL (ref 6–8.4)
RBC # BLD AUTO: 2.79 M/UL (ref 4–5.4)
SODIUM SERPL-SCNC: 135 MMOL/L (ref 136–145)
WBC # BLD AUTO: 10.22 K/UL (ref 3.9–12.7)

## 2025-03-10 PROCEDURE — 99900035 HC TECH TIME PER 15 MIN (STAT): Mod: HCNC

## 2025-03-10 PROCEDURE — 21400001 HC TELEMETRY ROOM: Mod: HCNC

## 2025-03-10 PROCEDURE — 63600175 PHARM REV CODE 636 W HCPCS: Mod: HCNC | Performed by: INTERNAL MEDICINE

## 2025-03-10 PROCEDURE — 25000242 PHARM REV CODE 250 ALT 637 W/ HCPCS: Mod: HCNC | Performed by: NURSE PRACTITIONER

## 2025-03-10 PROCEDURE — 63600175 PHARM REV CODE 636 W HCPCS: Mod: HCNC | Performed by: NURSE PRACTITIONER

## 2025-03-10 PROCEDURE — 80053 COMPREHEN METABOLIC PANEL: CPT | Mod: HCNC | Performed by: HOSPITALIST

## 2025-03-10 PROCEDURE — 80100016 HC MAINTENANCE HEMODIALYSIS: Mod: HCNC

## 2025-03-10 PROCEDURE — 99233 SBSQ HOSP IP/OBS HIGH 50: CPT | Mod: HCNC,GC,, | Performed by: STUDENT IN AN ORGANIZED HEALTH CARE EDUCATION/TRAINING PROGRAM

## 2025-03-10 PROCEDURE — 25000003 PHARM REV CODE 250: Mod: HCNC | Performed by: HOSPITALIST

## 2025-03-10 PROCEDURE — 85025 COMPLETE CBC W/AUTO DIFF WBC: CPT | Mod: HCNC | Performed by: HOSPITALIST

## 2025-03-10 PROCEDURE — 27000221 HC OXYGEN, UP TO 24 HOURS: Mod: HCNC

## 2025-03-10 PROCEDURE — 94761 N-INVAS EAR/PLS OXIMETRY MLT: CPT | Mod: HCNC

## 2025-03-10 PROCEDURE — 25000003 PHARM REV CODE 250: Mod: HCNC | Performed by: NURSE PRACTITIONER

## 2025-03-10 RX ORDER — SODIUM CHLORIDE 9 MG/ML
INJECTION, SOLUTION INTRAVENOUS
Status: CANCELLED | OUTPATIENT
Start: 2025-03-10

## 2025-03-10 RX ORDER — SODIUM CHLORIDE 9 MG/ML
INJECTION, SOLUTION INTRAVENOUS ONCE
Status: CANCELLED | OUTPATIENT
Start: 2025-03-10 | End: 2025-03-10

## 2025-03-10 RX ADMIN — LORAZEPAM 1 MG: 1 TABLET ORAL at 08:03

## 2025-03-10 RX ADMIN — HEPARIN SODIUM 1000 UNITS: 1000 INJECTION, SOLUTION INTRAVENOUS; SUBCUTANEOUS at 06:03

## 2025-03-10 RX ADMIN — THERA TABS 1 TABLET: TAB at 08:03

## 2025-03-10 RX ADMIN — METOPROLOL SUCCINATE 25 MG: 25 TABLET, EXTENDED RELEASE ORAL at 08:03

## 2025-03-10 RX ADMIN — OXYCODONE HYDROCHLORIDE 10 MG: 10 TABLET, FILM COATED, EXTENDED RELEASE ORAL at 08:03

## 2025-03-10 RX ADMIN — ISOSORBIDE MONONITRATE 60 MG: 60 TABLET, EXTENDED RELEASE ORAL at 08:03

## 2025-03-10 RX ADMIN — PANTOPRAZOLE SODIUM 40 MG: 40 TABLET, DELAYED RELEASE ORAL at 08:03

## 2025-03-10 RX ADMIN — HEPARIN SODIUM 5000 UNITS: 5000 INJECTION INTRAVENOUS; SUBCUTANEOUS at 05:03

## 2025-03-10 RX ADMIN — INSULIN GLARGINE 5 UNITS: 100 INJECTION, SOLUTION SUBCUTANEOUS at 08:03

## 2025-03-10 RX ADMIN — HEPARIN SODIUM 5000 UNITS: 5000 INJECTION INTRAVENOUS; SUBCUTANEOUS at 10:03

## 2025-03-10 RX ADMIN — ASPIRIN 81 MG CHEWABLE TABLET 81 MG: 81 TABLET CHEWABLE at 08:03

## 2025-03-10 RX ADMIN — ATORVASTATIN CALCIUM 80 MG: 40 TABLET, FILM COATED ORAL at 08:03

## 2025-03-10 RX ADMIN — FLUOXETINE HYDROCHLORIDE 40 MG: 20 CAPSULE ORAL at 08:03

## 2025-03-10 RX ADMIN — TICAGRELOR 60 MG: 60 TABLET ORAL at 08:03

## 2025-03-10 RX ADMIN — BUMETANIDE 4 MG: 1 TABLET ORAL at 08:03

## 2025-03-10 NOTE — PLAN OF CARE
Pt off the floor to dialysis.  SW will attempt to meet with patient later for discharge reassessment.      Uzma Prieto LMSW  Part-Time-  [facility]  Ext. [phone]

## 2025-03-10 NOTE — ASSESSMENT & PLAN NOTE
Patient's blood pressure range in the last 24 hours was: BP  Min: 109/58  Max: 156/62.The patient's inpatient anti-hypertensive regimen is listed below:  Current Antihypertensives  isosorbide mononitrate 24 hr tablet 60 mg, Daily, Oral  metoprolol succinate (TOPROL-XL) 24 hr tablet 25 mg, Daily, Oral  hydrALAZINE tablet 50 mg, Every 8 hours, Oral  bumetanide tablet 4 mg, 2 times daily, Oral    Plan  - BP is controlled, no changes needed to their regimen    3/4- /58   Pulse 98. HD planned today.

## 2025-03-10 NOTE — ASSESSMENT & PLAN NOTE
Patient's FSGs are controlled on current hypoglycemics.   Last A1c reviewed-   Lab Results   Component Value Date    HGBA1C 6.6 (H) 02/27/2025     Most recent fingerstick glucose reviewed-   Recent Labs   Lab 03/09/25  1455 03/09/25  1736 03/10/25  0821 03/10/25  1145   POCTGLUCOSE 252* 280* 147* 175*     Current correctional scale  Low  Maintain anti-hyperglycemic dose as follows-   Antihyperglycemics (From admission, onward)      Start     Stop Route Frequency Ordered    03/03/25 2100  insulin glargine U-100 (Lantus) pen 5 Units         -- SubQ Nightly 03/03/25 0233    03/03/25 0227  insulin aspart U-100 pen 0-5 Units         -- SubQ Before meals & nightly PRN 03/03/25 0128        -Accuchecks AC/HS  -Diabetic/renal diet

## 2025-03-10 NOTE — PLAN OF CARE
Problem: Adult Inpatient Plan of Care  Goal: Plan of Care Review  Outcome: Progressing  Goal: Patient-Specific Goal (Individualized)  Outcome: Progressing  Goal: Absence of Hospital-Acquired Illness or Injury  Outcome: Progressing  Goal: Optimal Comfort and Wellbeing  Outcome: Progressing  Goal: Readiness for Transition of Care  Outcome: Progressing     Problem: Skin Injury Risk Increased  Goal: Skin Health and Integrity  Outcome: Progressing     Problem: Wound  Goal: Optimal Coping  Outcome: Progressing  Goal: Optimal Functional Ability  Outcome: Progressing  Goal: Absence of Infection Signs and Symptoms  Outcome: Progressing  Goal: Improved Oral Intake  Outcome: Progressing  Goal: Optimal Pain Control and Function  Outcome: Progressing  Goal: Skin Health and Integrity  Outcome: Progressing  Goal: Optimal Wound Healing  Outcome: Progressing     Problem: Acute Kidney Injury/Impairment  Goal: Fluid and Electrolyte Balance  Outcome: Progressing  Goal: Improved Oral Intake  Outcome: Progressing  Goal: Effective Renal Function  Outcome: Progressing

## 2025-03-10 NOTE — ASSESSMENT & PLAN NOTE
ESRD (end stage renal disease)   On hemodialysis since 2/22/2025. Anuric since starting but was able to urinate 300 mL with 300 mg of IV furosemide but none since. Nephrology is calling it ESRD now.    Baseline creatinine is unknown. Most recent creatinine and eGFR are listed below.  Recent Labs     03/08/25  1619   CREATININE 1.9*   EGFRNORACEVR 27.4*      Plan  - Dialysis per Nephrology.  - Appreciate Palliative Care for medication management of air hunger per Nephrology recommendation.

## 2025-03-10 NOTE — SUBJECTIVE & OBJECTIVE
Interval History: Has Venturi mask on and it helps shortness of breath. Chest X-ray shows no improvement of pulmonary vascular congestion and edema despite being negative 10 liters since admission.    Review of Systems   Constitutional:  Negative for chills and fever.   Respiratory:  Positive for shortness of breath.    Neurological:  Negative for seizures and syncope.     Objective:     Vital Signs (Most Recent):  Temp: 97.2 °F (36.2 °C) (03/10/25 1147)  Pulse: 89 (03/10/25 1155)  Resp: 16 (03/10/25 1147)  BP: (!) 130/57 (03/10/25 1147)  SpO2: 97 % (03/10/25 1147) Vital Signs (24h Range):  Temp:  [97.2 °F (36.2 °C)-98.5 °F (36.9 °C)] 97.2 °F (36.2 °C)  Pulse:  [] 89  Resp:  [16-22] 16  SpO2:  [97 %-99 %] 97 %  BP: (109-156)/(53-62) 130/57     Weight: 85.7 kg (189 lb)  Body mass index is 27.91 kg/m².  No intake or output data in the 24 hours ending 03/10/25 1446          Physical Exam  Vitals and nursing note reviewed.   Constitutional:       General: She is not in acute distress.     Appearance: She is well-developed. She is not diaphoretic.      Interventions: She is not intubated.Nasal cannula in place.   Pulmonary:      Effort: Pulmonary effort is normal. No accessory muscle usage or respiratory distress. She is not intubated.   Musculoskeletal:      Right lower leg: Edema present.      Left lower leg: Edema present.   Neurological:      Mental Status: She is alert and oriented to person, place, and time. Mental status is at baseline.      Motor: No seizure activity.   Psychiatric:         Behavior: Behavior is not agitated, aggressive or hyperactive. Behavior is cooperative.               Significant Labs: All pertinent labs within the past 24 hours have been reviewed.    Significant Imaging: I have reviewed all pertinent imaging results/findings within the past 24 hours.  X-Ray Chest AP Portable 3/10/25: FINDINGS:   Stable position of large-bore right jugular catheter, tip superimposing cavoatrial soft  tissues.  Stable enlargement of cardiopericardial silhouette, arch calcification, and left-sided dual lead pacing device.  Stable pulmonary vascular congestion, interstitial edema, and left greater than right lower lung zone areas of at atelectasis and or infiltrate and or edema and bilateral pleural effusions.  No right or left pneumothoraces.  EKG leads superimposed chest and abdomen.   Impression:  No significant interval changes.

## 2025-03-10 NOTE — PROGRESS NOTES
[facility]  Nephrology  Progress Note    Patient Name: Lorena Contreras  MRN: [id number]  Admission Date: 3/2/2025  Hospital Length of Stay: 7 days  Attending Provider: Jairon Morales MD   Primary Care Physician: Jorge Paris MD  Principal Problem:Hypervolemia associated with renal insufficiency    Subjective:     HPI: Lorena is a pleasant 75 yo woman w pmhx significant for DM2, fibromyalgia, lymphoma s/p chemo, HTN HLD CVA MI CAD PCI HFpEF anemia, KYA on CKD3b now dialysis dependent 2/22/25 after recent hospitalization at [facility] cardiogenic shock from complete heart block w heart cath on 2/19. Last HD session 3/1/25. She was admitted overnight for SOB and chest tightness since discharge and leg pain attributed to swelling. Cr stable overnight from 2.1 to 1.9. No IO recorded. Anuric since starting HD. CXR showed pulmonary edema and bilateral pleural effusions. She was given 100 mg IV lasix in ED.     Interval History; Patient seen this morning no acute distress. Planning for HD today.       Objective:     Vital Signs (Most Recent):  Temp: 97.6 °F (36.4 °C) (03/10/25 0821)  Pulse: 95 (03/10/25 0825)  Resp: 16 (03/10/25 0827)  BP: (!) 156/62 (03/10/25 0827)  SpO2: 99 % (03/10/25 0825) Vital Signs (24h Range):  Temp:  [97.5 °F (36.4 °C)-98.5 °F (36.9 °C)] 97.6 °F (36.4 °C)  Pulse:  [] 95  Resp:  [16-22] 16  SpO2:  [96 %-99 %] 99 %  BP: (109-156)/(53-62) 156/62     Weight: 85.7 kg (189 lb) (03/03/25 0700)  Body mass index is 27.91 kg/m².  Body surface area is 2.04 meters squared.    No intake/output data recorded.     Physical Exam  Cardiovascular:      Rate and Rhythm: Regular rhythm.      Heart sounds: Normal heart sounds.   Musculoskeletal:         General: Swelling present.   Neurological:      Mental Status: She is alert. Mental status is at baseline.   Psychiatric:         Mood and Affect: Mood normal.          Significant Labs:  CBC:   Recent Labs   Lab 03/05/25  0339   WBC  16.29*   RBC 2.85*   HGB 7.9*   HCT 26.5*      MCV 93   MCH 27.7   MCHC 29.8*     CMP:   Recent Labs   Lab 03/05/25  0339 03/05/25 2017 03/08/25  1619      < > 149*   CALCIUM 8.0*   < > 7.9*   ALBUMIN 2.4*   < > 2.2*   PROT 6.0  --   --    *   < > 136   K 3.4*   < > 3.5   CO2 22*   < > 22*      < > 106   BUN 14   < > 16   CREATININE 1.5*   < > 1.9*   ALKPHOS 123  --   --    ALT 10  --   --    AST 25  --   --    BILITOT 0.4  --   --     < > = values in this interval not displayed.     All labs within the past 24 hours have been reviewed.  Assessment/Plan:     -KYA on CKD3b secondary to hemodynamic instability from complete heart block w heart cath on 2/19, shortly after started on HD  -DM2 - longstanding, difficult to control  -Hypoxic resp failure  -Fibromyalgia  -HTN  -Complete heart block s/p PPM  -Pulmonary HTN  -Secondary hyperparathyroidism of renal origin  -Anemia in CKD     HD info: [facility]e, [facility] ? Kg, Dr Alex Castañeda. Last HD session PTA 3/1/25. Anuric. R tunnel catheter. MWF.        Plan  -Continue MWF dialysis.   -EPO w HD for anemia    Thank you for your consult. I will follow-up with patient. Please contact us if you have any additional questions.    Merlin Eaton MD  Nephrology  [facility] - Med Surg

## 2025-03-10 NOTE — SUBJECTIVE & OBJECTIVE
Interval History; Patient seen this morning no acute distress. Planning for HD today.       Objective:     Vital Signs (Most Recent):  Temp: 97.6 °F (36.4 °C) (03/10/25 0821)  Pulse: 95 (03/10/25 0825)  Resp: 16 (03/10/25 0827)  BP: (!) 156/62 (03/10/25 0827)  SpO2: 99 % (03/10/25 0825) Vital Signs (24h Range):  Temp:  [97.5 °F (36.4 °C)-98.5 °F (36.9 °C)] 97.6 °F (36.4 °C)  Pulse:  [] 95  Resp:  [16-22] 16  SpO2:  [96 %-99 %] 99 %  BP: (109-156)/(53-62) 156/62     Weight: 85.7 kg (189 lb) (03/03/25 0700)  Body mass index is 27.91 kg/m².  Body surface area is 2.04 meters squared.    No intake/output data recorded.     Physical Exam  Cardiovascular:      Rate and Rhythm: Regular rhythm.      Heart sounds: Normal heart sounds.   Musculoskeletal:         General: Swelling present.   Neurological:      Mental Status: She is alert. Mental status is at baseline.   Psychiatric:         Mood and Affect: Mood normal.          Significant Labs:  CBC:   Recent Labs   Lab 03/05/25 0339   WBC 16.29*   RBC 2.85*   HGB 7.9*   HCT 26.5*      MCV 93   MCH 27.7   MCHC 29.8*     CMP:   Recent Labs   Lab 03/05/25 0339 03/05/25 2017 03/08/25  1619      < > 149*   CALCIUM 8.0*   < > 7.9*   ALBUMIN 2.4*   < > 2.2*   PROT 6.0  --   --    *   < > 136   K 3.4*   < > 3.5   CO2 22*   < > 22*      < > 106   BUN 14   < > 16   CREATININE 1.5*   < > 1.9*   ALKPHOS 123  --   --    ALT 10  --   --    AST 25  --   --    BILITOT 0.4  --   --     < > = values in this interval not displayed.     All labs within the past 24 hours have been reviewed.

## 2025-03-10 NOTE — ASSESSMENT & PLAN NOTE
"Anemia is likely due to chronic disease due to Chronic Kidney Disease. Most recent hemoglobin and hematocrit are listed below.  No results for input(s): "HGB", "HCT" in the last 72 hours.    Plan  - Monitor serial CBC: Daily  - Transfuse PRBC if patient becomes hemodynamically unstable, symptomatic or H/H drops below 7/21.  - Patient has not received any PRBC transfusions to date  - Patient's anemia is currently stable  " Glycopyrrolate Pregnancy And Lactation Text: This medication is Pregnancy Category B and is considered safe during pregnancy. It is unknown if it is excreted breast milk.

## 2025-03-10 NOTE — PROGRESS NOTES
[facility] Medicine  Progress Note    Patient Name: Lorena Contreras  MRN: [id number]  Patient Class: IP- Inpatient   Admission Date: 3/2/2025  Length of Stay: 7 days  Attending Physician: Jairon Morales MD  Primary Care Provider: Jorge Paris MD        Subjective     Principal Problem:Hypervolemia associated with renal insufficiency        HPI:  Lorena Contreras is a 74 year old white woman with hypertension, hyperlipidemia, diabetes mellitus type 2, coronary artery disease, history of stroke, complete heart block status post dual chamber cardiac pacemaker placed 2/17/2025, pulmonary hypertension, chronic diastolic heart failure, peripheral vascular disease, chronic kidney disease stage 3b (on hemodialysis since 2/22/2025), anemia, follicular lymphoma diagnosed in 2009 treated with chemotherapy, gastroesophageal reflux disease, constipation, restless legs syndrome, fibromyalgia, anxiety, depression, history of right elbow dislocation repair in 2015, history of incision and drainage of right foot on 6/2/2021, history of open reduction and internal fixation of right pilon fracture on 8/14/2024 and right acetabulum fracture on 8/16/2024, history of right medial ankle fasciocutaneous flap and excisional debridement of left ankle medial wound on 9/25/2024, history of tonsillectomy in 1955. She lives in [place], [place]. She is . Her primary care physician is Dr. Jorge Paris.    She presented to [facility] Emergency Department on 3/2/2025 with worsening shortness of breath and chest tightness since being discharged from [facility] on 2/25/2025, where she was admitted on 2/15/2025 for complete heart block, acute kidney injury, and non-ST elevation myocardial infarction. She had mild improvement of symptoms after hemodialysis lasting several hours. She had associated dyspnea on exertion, significant swelling of bilateral lower extremities and  abdomen, orthopnea, wheezing, fatigue, and generalized weakness. Chest tightness was non-radiating, substernal, and worse with musculoskeletal manipulation and deep breathing, but unaffected by exertion and positioning. She had diffuse leg pain due to swelling, generalized abdominal discomfort described as dull, nagging pain, and nausea and vomiting. She has been anuric since starting dialysis. She had not missed any dialysis treatments.   In the emergency department, she had tachypnea and mild tachycardia. She was placed on 2 liters/minute of supplemental oxygen for comfort. Labs showed stable anemia and elevated WBC (06110/uL), bicarbonate 20 mmol/L, creatinine 2.1 mg/dL (baseline prior to starting dialysis was around 2.0), glucose 194 mg/dL, calcium 8.6 mg/dL, albumin 3.1 g/dL, BNP 1335 pg/mL, high sensitivity troponin 64 ng/L (repeat 57). EKG showed sinus rhythm with bigeminy, rate 70 bpm, no ST elevation or depression. Chest X-ray showed cardiomegaly with pulmonary edema and bilateral pleural effusions with aeration similar to mildly worse than 2/19/2025. Abdominal ultrasound showed cholelithiasis and gallbladder sludge without evidence of cholecystitis, distended gallbladder, hepatomegaly, bilateral pleural effusions. She was given acetaminophen and 100 mg of IV furosemide. She was admitted to Hospital Medicine Team S.    Overview/Hospital Course:  Nephrology was consulted for dialysis. She urinated 300 mL after furosemide. She had not urinated since started dialysis. She was put on IV furosemide 80 mg twice daily. She takes oxycodone 10 mg at home and requested something stronger. Oxycodone was increased to her home dose. On 3/5/2025, furosemide was changed to 80 mg by mouth, which was ineffective. It was changed to bumetanide 4 mg, which was also ineffective. Nephrology recommended Palliative Care consult for medication management of air hunger. On 3/7/2025 she reported numbness and tingling in her lower  extremities that is chronic and intermittent (she has a prior diagnosis of peripheral neuropathy on her chart since 2015). Vitamin B12 level was high. Further evaluation of this can continue outpatient since she has had the problem for at least 10 years. By 3/9/2025, her net fluid status since admission was negative 10.878 liters. She reported shortness of breath while breathing through her mouth so was put on Venturi mask. Repeat chest X-ray on 3/10/2025 showed no change.     Interval History: Has Venturi mask on and it helps shortness of breath. Chest X-ray shows no improvement of pulmonary vascular congestion and edema despite being negative 10 liters since admission.    Review of Systems   Constitutional:  Negative for chills and fever.   Respiratory:  Positive for shortness of breath.    Neurological:  Negative for seizures and syncope.     Objective:     Vital Signs (Most Recent):  Temp: 97.2 °F (36.2 °C) (03/10/25 1147)  Pulse: 89 (03/10/25 1155)  Resp: 16 (03/10/25 1147)  BP: (!) 130/57 (03/10/25 1147)  SpO2: 97 % (03/10/25 1147) Vital Signs (24h Range):  Temp:  [97.2 °F (36.2 °C)-98.5 °F (36.9 °C)] 97.2 °F (36.2 °C)  Pulse:  [] 89  Resp:  [16-22] 16  SpO2:  [97 %-99 %] 97 %  BP: (109-156)/(53-62) 130/57     Weight: 85.7 kg (189 lb)  Body mass index is 27.91 kg/m².  No intake or output data in the 24 hours ending 03/10/25 1446          Physical Exam  Vitals and nursing note reviewed.   Constitutional:       General: She is not in acute distress.     Appearance: She is well-developed. She is not diaphoretic.      Interventions: She is not intubated.Nasal cannula in place.   Pulmonary:      Effort: Pulmonary effort is normal. No accessory muscle usage or respiratory distress. She is not intubated.   Musculoskeletal:      Right lower leg: Edema present.      Left lower leg: Edema present.   Neurological:      Mental Status: She is alert and oriented to person, place, and time. Mental status is at  baseline.      Motor: No seizure activity.   Psychiatric:         Behavior: Behavior is not agitated, aggressive or hyperactive. Behavior is cooperative.               Significant Labs: All pertinent labs within the past 24 hours have been reviewed.    Significant Imaging: I have reviewed all pertinent imaging results/findings within the past 24 hours.  X-Ray Chest AP Portable 3/10/25: FINDINGS:   Stable position of large-bore right jugular catheter, tip superimposing cavoatrial soft tissues.  Stable enlargement of cardiopericardial silhouette, arch calcification, and left-sided dual lead pacing device.  Stable pulmonary vascular congestion, interstitial edema, and left greater than right lower lung zone areas of at atelectasis and or infiltrate and or edema and bilateral pleural effusions.  No right or left pneumothoraces.  EKG leads superimposed chest and abdomen.   Impression:  No significant interval changes.       Assessment & Plan  Hypervolemia associated with renal insufficiency  Dialysis for fluid removal. Furosemide and bumetanide were ineffective.  Anxiety  -Chronic issue.  -PRN hydroxyzine.  Primary hypertension  Patient's blood pressure range in the last 24 hours was: BP  Min: 109/58  Max: 156/62.The patient's inpatient anti-hypertensive regimen is listed below:  Current Antihypertensives  isosorbide mononitrate 24 hr tablet 60 mg, Daily, Oral  metoprolol succinate (TOPROL-XL) 24 hr tablet 25 mg, Daily, Oral  hydrALAZINE tablet 50 mg, Every 8 hours, Oral  bumetanide tablet 4 mg, 2 times daily, Oral    Plan  - BP is controlled, no changes needed to their regimen    3/4- /58   Pulse 98. HD planned today.   Follicular lymphoma  S/p chemo in 2010. In remission.   Mixed hyperlipidemia   Patient is chronically on statin.will continue for now. Monitor clinically. Last LDL was   Lab Results   Component Value Date    LDLCALC 105.6 01/14/2025      Coronary artery disease of native artery of native heart  "with stable angina pectoris  Continue home aspirin, atorvastatin, and ticagrelor.  Pulmonary hypertension -- echo 11/2019  Chronic.    Peripheral vascular disease in diabetes mellitus -- CLEMENT 05/2019  -Chronic issue.  -Continue home aspirin and atorvastatin.  Acute on chronic diastolic heart failure  Volume removal with dialysis. Oral furosemide and bumetanide were not effective. Monitor intake/output, electrolytes.  Type 2 diabetes mellitus with stage 3b chronic kidney disease, with long-term current use of insulin  Patient's FSGs are controlled on current hypoglycemics.   Last A1c reviewed-   Lab Results   Component Value Date    HGBA1C 6.6 (H) 02/27/2025     Most recent fingerstick glucose reviewed-   Recent Labs   Lab 03/09/25  1455 03/09/25  1736 03/10/25  0821 03/10/25  1145   POCTGLUCOSE 252* 280* 147* 175*     Current correctional scale  Low  Maintain anti-hyperglycemic dose as follows-   Antihyperglycemics (From admission, onward)      Start     Stop Route Frequency Ordered    03/03/25 2100  insulin glargine U-100 (Lantus) pen 5 Units         -- SubQ Nightly 03/03/25 0233    03/03/25 0227  insulin aspart U-100 pen 0-5 Units         -- SubQ Before meals & nightly PRN 03/03/25 0128        -Accuchecks AC/HS  -Diabetic/renal diet  Aortic stenosis  New finding. Contributes to heart failure.  Constipation  Bisacodyl, senna-docusate, and lactulose prn.  Anemia  Anemia is likely due to chronic disease due to Chronic Kidney Disease. Most recent hemoglobin and hematocrit are listed below.  No results for input(s): "HGB", "HCT" in the last 72 hours.    Plan  - Monitor serial CBC: Daily  - Transfuse PRBC if patient becomes hemodynamically unstable, symptomatic or H/H drops below 7/21.  - Patient has not received any PRBC transfusions to date  - Patient's anemia is currently stable  CHB (complete heart block)  S/p pacemaker    Acute kidney injury superimposed on stage 3b chronic kidney disease  ESRD (end stage renal " disease)   On hemodialysis since 2/22/2025. Anuric since starting but was able to urinate 300 mL with 300 mg of IV furosemide but none since. Nephrology is calling it ESRD now.    Baseline creatinine is unknown. Most recent creatinine and eGFR are listed below.  Recent Labs     03/08/25  1619   CREATININE 1.9*   EGFRNORACEVR 27.4*      Plan  - Dialysis per Nephrology.  - Appreciate Palliative Care for medication management of air hunger per Nephrology recommendation.  Cardiac pacemaker in situ  2/17/2025. No acute issues.  GERD (gastroesophageal reflux disease)  Continue home pantoprazole.  Peripheral neuropathy  Previously diagnosed. Intermittent symptoms. B12 not low. Could be due to diabetes or prior chemotherapy. No need for further inpatient evaluation.    Palliative care encounter      Shortness of breath  Due to fluid overload. Assess for need for home oxygen at discharge.    Advanced care planning/counseling discussion      VTE Risk Mitigation (From admission, onward)           Ordered     heparin (porcine) injection 1,000 Units  As needed (PRN)         03/04/25 1302     heparin (porcine) injection 5,000 Units  Every 8 hours         03/03/25 0128     IP VTE HIGH RISK PATIENT  Once         03/03/25 0128     Place sequential compression device  Until discontinued         03/03/25 0128                    Discharge Planning   MIKHAIL: 3/12/2025     Code Status: Full Code   Medical Readiness for Discharge Date:   Discharge Plan A: Home, Home with family, Home Health   Discharge Delays: None known at this time                    Jairon Morales MD  Department of Hospital Medicine   [facility]

## 2025-03-10 NOTE — PROGRESS NOTES
03/10/25 1838   Post-Hemodialysis Assessment   Blood Volume Processed (Liters) 67.4 L   Duration of Treatment 203 minutes   Net Fluid Removal 2038     HD tx completed, blood returned and PC heparin locked without issue. Report called to primary RN, pt back to unit via stretcher w transport.

## 2025-03-11 ENCOUNTER — PATIENT OUTREACH (OUTPATIENT)
Dept: ADMINISTRATIVE | Facility: HOSPITAL | Age: 75
End: 2025-03-11
Payer: MEDICARE

## 2025-03-11 PROBLEM — R53.81 PHYSICAL DEBILITY: Status: ACTIVE | Noted: 2025-03-11

## 2025-03-11 PROBLEM — R06.02 SHORTNESS OF BREATH: Status: RESOLVED | Noted: 2025-03-07 | Resolved: 2025-03-11

## 2025-03-11 LAB
POCT GLUCOSE: 193 MG/DL (ref 70–110)
POCT GLUCOSE: 247 MG/DL (ref 70–110)
POCT GLUCOSE: 248 MG/DL (ref 70–110)

## 2025-03-11 PROCEDURE — 25000003 PHARM REV CODE 250: Mod: HCNC | Performed by: NURSE PRACTITIONER

## 2025-03-11 PROCEDURE — 25000003 PHARM REV CODE 250: Mod: HCNC | Performed by: HOSPITALIST

## 2025-03-11 PROCEDURE — 99233 SBSQ HOSP IP/OBS HIGH 50: CPT | Mod: HCNC,GC,, | Performed by: STUDENT IN AN ORGANIZED HEALTH CARE EDUCATION/TRAINING PROGRAM

## 2025-03-11 PROCEDURE — 25000242 PHARM REV CODE 250 ALT 637 W/ HCPCS: Mod: HCNC | Performed by: NURSE PRACTITIONER

## 2025-03-11 PROCEDURE — 21400001 HC TELEMETRY ROOM: Mod: HCNC

## 2025-03-11 PROCEDURE — 63600175 PHARM REV CODE 636 W HCPCS: Mod: HCNC | Performed by: NURSE PRACTITIONER

## 2025-03-11 RX ORDER — HYDRALAZINE HYDROCHLORIDE 50 MG/1
50 TABLET, FILM COATED ORAL EVERY 8 HOURS
Qty: 135 TABLET | Refills: 3 | Status: SHIPPED | OUTPATIENT
Start: 2025-03-11 | End: 2026-03-11

## 2025-03-11 RX ORDER — SODIUM CHLORIDE 9 MG/ML
INJECTION, SOLUTION INTRAVENOUS ONCE
Status: CANCELLED | OUTPATIENT
Start: 2025-03-11 | End: 2025-03-11

## 2025-03-11 RX ORDER — INSULIN GLARGINE 100 [IU]/ML
8 INJECTION, SOLUTION SUBCUTANEOUS NIGHTLY
Start: 2025-03-11 | End: 2026-03-11

## 2025-03-11 RX ORDER — SODIUM CHLORIDE 9 MG/ML
INJECTION, SOLUTION INTRAVENOUS
Status: CANCELLED | OUTPATIENT
Start: 2025-03-11

## 2025-03-11 RX ADMIN — OXYCODONE HYDROCHLORIDE 10 MG: 10 TABLET ORAL at 10:03

## 2025-03-11 RX ADMIN — TICAGRELOR 60 MG: 60 TABLET ORAL at 09:03

## 2025-03-11 RX ADMIN — ASPIRIN 81 MG CHEWABLE TABLET 81 MG: 81 TABLET CHEWABLE at 09:03

## 2025-03-11 RX ADMIN — FLUOXETINE HYDROCHLORIDE 40 MG: 20 CAPSULE ORAL at 09:03

## 2025-03-11 RX ADMIN — ISOSORBIDE MONONITRATE 60 MG: 60 TABLET, EXTENDED RELEASE ORAL at 09:03

## 2025-03-11 RX ADMIN — METOPROLOL SUCCINATE 25 MG: 25 TABLET, EXTENDED RELEASE ORAL at 09:03

## 2025-03-11 RX ADMIN — INSULIN ASPART 3 UNITS: 100 INJECTION, SOLUTION INTRAVENOUS; SUBCUTANEOUS at 05:03

## 2025-03-11 RX ADMIN — TICAGRELOR 60 MG: 60 TABLET ORAL at 08:03

## 2025-03-11 RX ADMIN — ATORVASTATIN CALCIUM 80 MG: 40 TABLET, FILM COATED ORAL at 08:03

## 2025-03-11 RX ADMIN — LORAZEPAM 1 MG: 1 TABLET ORAL at 09:03

## 2025-03-11 RX ADMIN — HYDRALAZINE HYDROCHLORIDE 50 MG: 25 TABLET ORAL at 05:03

## 2025-03-11 RX ADMIN — HEPARIN SODIUM 5000 UNITS: 5000 INJECTION INTRAVENOUS; SUBCUTANEOUS at 05:03

## 2025-03-11 RX ADMIN — PANTOPRAZOLE SODIUM 40 MG: 40 TABLET, DELAYED RELEASE ORAL at 09:03

## 2025-03-11 RX ADMIN — HEPARIN SODIUM 5000 UNITS: 5000 INJECTION INTRAVENOUS; SUBCUTANEOUS at 02:03

## 2025-03-11 RX ADMIN — THERA TABS 1 TABLET: TAB at 09:03

## 2025-03-11 RX ADMIN — OXYCODONE HYDROCHLORIDE 10 MG: 10 TABLET, FILM COATED, EXTENDED RELEASE ORAL at 09:03

## 2025-03-11 RX ADMIN — OXYCODONE HYDROCHLORIDE 10 MG: 10 TABLET, FILM COATED, EXTENDED RELEASE ORAL at 08:03

## 2025-03-11 RX ADMIN — HEPARIN SODIUM 5000 UNITS: 5000 INJECTION INTRAVENOUS; SUBCUTANEOUS at 10:03

## 2025-03-11 RX ADMIN — INSULIN ASPART 2 UNITS: 100 INJECTION, SOLUTION INTRAVENOUS; SUBCUTANEOUS at 12:03

## 2025-03-11 RX ADMIN — INSULIN GLARGINE 5 UNITS: 100 INJECTION, SOLUTION SUBCUTANEOUS at 08:03

## 2025-03-11 NOTE — ASSESSMENT & PLAN NOTE
Patient's FSGs are controlled on current hypoglycemics.   Last A1c reviewed-   Lab Results   Component Value Date    HGBA1C 6.6 (H) 02/27/2025     Most recent fingerstick glucose reviewed-   Recent Labs   Lab 03/10/25  1145 03/10/25  2012 03/11/25  0823   POCTGLUCOSE 175* 170* 193*     Current correctional scale  Low  Maintain anti-hyperglycemic dose as follows-   Antihyperglycemics (From admission, onward)      Start     Stop Route Frequency Ordered    03/03/25 2100  insulin glargine U-100 (Lantus) pen 5 Units         -- SubQ Nightly 03/03/25 0233    03/03/25 0227  insulin aspart U-100 pen 0-5 Units         -- SubQ Before meals & nightly PRN 03/03/25 0128        -Accuchecks AC/HS  -Diabetic/renal diet

## 2025-03-11 NOTE — ASSESSMENT & PLAN NOTE
ESRD (end stage renal disease)   On hemodialysis since 2/22/2025. Anuric since starting but was able to urinate 300 mL with 300 mg of IV furosemide but none since. Nephrology is calling it ESRD now.    Baseline creatinine is unknown. Most recent creatinine and eGFR are listed below.  Recent Labs     03/08/25  1619 03/10/25  1519   CREATININE 1.9* 2.3*   EGFRNORACEVR 27.4* 21.8*      Plan  - Dialysis per Nephrology.  - Appreciate Palliative Care for medication management of air hunger per Nephrology recommendation.

## 2025-03-11 NOTE — SUBJECTIVE & OBJECTIVE
Interval History: She reported that she has been debilitated since her pacemaker placement last month and was able to ambulate on her own prior to that. She said that Therapy has not seen her since admission. Therapy was never consulted to see her. Will consult PT and OT.     Review of Systems   Constitutional:  Negative for chills and fever.   Musculoskeletal:  Positive for gait problem.   Neurological:  Positive for weakness. Negative for seizures and syncope.     Objective:     Vital Signs (Most Recent):  Temp: 98.5 °F (36.9 °C) (03/11/25 0821)  Pulse: 96 (03/11/25 0821)  Resp: 18 (03/11/25 0903)  BP: (!) 121/58 (03/11/25 0821)  SpO2: 99 % (03/11/25 0821) Vital Signs (24h Range):  Temp:  [97.2 °F (36.2 °C)-99.2 °F (37.3 °C)] 98.5 °F (36.9 °C)  Pulse:  [88-98] 96  Resp:  [16-20] 18  SpO2:  [97 %-100 %] 99 %  BP: (100-133)/(52-62) 121/58     Weight: 85.7 kg (189 lb)  Body mass index is 27.91 kg/m².    Intake/Output Summary (Last 24 hours) at 3/11/2025 1024  Last data filed at 3/10/2025 1838  Gross per 24 hour   Intake 500 ml   Output 2438 ml   Net -1938 ml             Physical Exam  Vitals and nursing note reviewed.   Constitutional:       General: She is not in acute distress.     Appearance: She is well-developed. She is not diaphoretic.      Interventions: She is not intubated.Nasal cannula in place.   Pulmonary:      Effort: Pulmonary effort is normal. No accessory muscle usage or respiratory distress. She is not intubated.   Neurological:      Mental Status: She is alert and oriented to person, place, and time. Mental status is at baseline.      Motor: No seizure activity.   Psychiatric:         Behavior: Behavior is not agitated, aggressive or hyperactive. Behavior is cooperative.               Significant Labs: All pertinent labs within the past 24 hours have been reviewed.    Significant Imaging: I have reviewed all pertinent imaging results/findings within the past 24 hours.

## 2025-03-11 NOTE — PROGRESS NOTES
[facility]  Nephrology  Progress Note    Patient Name: Lorena Contreras  MRN: [id number]  Admission Date: 3/2/2025  Hospital Length of Stay: 8 days  Attending Provider: Jairon Morales MD   Primary Care Physician: Jorge Paris MD  Principal Problem:Hypervolemia associated with renal insufficiency    Subjective:     HPI: Lorena is a pleasant 75 yo woman w pmhx significant for DM2, fibromyalgia, lymphoma s/p chemo, HTN HLD CVA MI CAD PCI HFpEF anemia, KYA on CKD3b now dialysis dependent 2/22/25 after recent hospitalization at [facility] cardiogenic shock from complete heart block w heart cath on 2/19. Last HD session 3/1/25. She was admitted overnight for SOB and chest tightness since discharge and leg pain attributed to swelling. Cr stable overnight from 2.1 to 1.9. No IO recorded. Anuric since starting HD. CXR showed pulmonary edema and bilateral pleural effusions. She was given 100 mg IV lasix in ED.     Interval History; Patient seen this morning no acute distress. Planning for HD tomorrow.       Objective:     Vital Signs (Most Recent):  Temp: 98.5 °F (36.9 °C) (03/11/25 0821)  Pulse: 93 (03/11/25 1050)  Resp: 18 (03/11/25 0903)  BP: (!) 121/58 (03/11/25 0821)  SpO2: 99 % (03/11/25 0821) Vital Signs (24h Range):  Temp:  [97.2 °F (36.2 °C)-99.2 °F (37.3 °C)] 98.5 °F (36.9 °C)  Pulse:  [88-98] 93  Resp:  [16-20] 18  SpO2:  [97 %-100 %] 99 %  BP: (100-133)/(52-62) 121/58     Weight: 85.7 kg (189 lb) (03/03/25 0700)  Body mass index is 27.91 kg/m².  Body surface area is 2.04 meters squared.    I/O last 3 completed shifts:  In: 500 [Other:500]  Out: 2438 [Other:2438]     Physical Exam  Cardiovascular:      Rate and Rhythm: Regular rhythm.      Heart sounds: Normal heart sounds.   Musculoskeletal:         General: Swelling present.   Neurological:      Mental Status: She is alert. Mental status is at baseline.   Psychiatric:         Mood and Affect: Mood normal.          Significant Labs:  CBC:    Recent Labs   Lab 03/10/25  1519   WBC 10.22   RBC 2.79*   HGB 7.9*   HCT 25.6*      MCV 92   MCH 28.3   MCHC 30.9*     CMP:   Recent Labs   Lab 03/10/25  1519   *   CALCIUM 8.2*   ALBUMIN 2.2*   PROT 5.8*   *   K 3.5   CO2 25      BUN 21   CREATININE 2.3*   ALKPHOS 123   ALT 11   AST 23   BILITOT 0.4     All labs within the past 24 hours have been reviewed.  Assessment/Plan:     -KYA on CKD3b secondary to hemodynamic instability from complete heart block w heart cath on 2/19, shortly after started on HD  -DM2 - longstanding, difficult to control  -Hypoxic resp failure  -Fibromyalgia  -HTN  -Complete heart block s/p PPM  -Pulmonary HTN  -Secondary hyperparathyroidism of renal origin  -Anemia in CKD     HD info: [facility]e, [facility] ? Kg, Dr Alex Castañeda. Last HD session PTA 3/1/25. Anuric. R tunnel catheter. MWF.         Plan  -Continue MWF dialysis.   -EPO w HD for anemia    Thank you for your consult. I will follow-up with patient. Please contact us if you have any additional questions.    Merlin Eaton MD  Nephrology  [facility]

## 2025-03-11 NOTE — ASSESSMENT & PLAN NOTE
Creatine stable for now. BMP reviewed- noted Estimated Creatinine Clearance: 25.1 mL/min (A) (based on SCr of 2.3 mg/dL (H)). according to latest data. Based on current GFR, CKD stage is end stage.  Monitor UOP and serial BMP and adjust therapy as needed. Renally dose meds. Avoid nephrotoxic medications and procedures.

## 2025-03-11 NOTE — ASSESSMENT & PLAN NOTE
Patient's blood pressure range in the last 24 hours was: BP  Min: 100/52  Max: 133/62.The patient's inpatient anti-hypertensive regimen is listed below:  Current Antihypertensives  isosorbide mononitrate 24 hr tablet 60 mg, Daily, Oral  metoprolol succinate (TOPROL-XL) 24 hr tablet 25 mg, Daily, Oral  hydrALAZINE tablet 50 mg, Every 8 hours, Oral    Plan  - BP is controlled, no changes needed to their regimen    3/4- /58   Pulse 98. HD planned today.

## 2025-03-11 NOTE — PROGRESS NOTES
[facility] Medicine  Progress Note    Patient Name: Lorena Contreras  MRN: [id number]  Patient Class: IP- Inpatient   Admission Date: 3/2/2025  Length of Stay: 8 days  Attending Physician: Jairon Morales MD  Primary Care Provider: Jorge Paris MD        Subjective     Principal Problem:Hypervolemia associated with renal insufficiency        HPI:  Lorena Contreras is a 74 year old white woman with hypertension, hyperlipidemia, diabetes mellitus type 2, coronary artery disease, history of stroke, complete heart block status post dual chamber cardiac pacemaker placed 2/17/2025, pulmonary hypertension, chronic diastolic heart failure, peripheral vascular disease, chronic kidney disease stage 3b (on hemodialysis since 2/22/2025), anemia, follicular lymphoma diagnosed in 2009 treated with chemotherapy, gastroesophageal reflux disease, constipation, restless legs syndrome, fibromyalgia, anxiety, depression, history of right elbow dislocation repair in 2015, history of incision and drainage of right foot on 6/2/2021, history of open reduction and internal fixation of right pilon fracture on 8/14/2024 and right acetabulum fracture on 8/16/2024, history of right medial ankle fasciocutaneous flap and excisional debridement of left ankle medial wound on 9/25/2024, history of tonsillectomy in 1955. She uses a rolling walker and wheelchair to assist mobility. She lives in [place], [place]. She is . Her primary care physician is Dr. Jorge Paris.    She presented to [facility] [place] [facility] Department on 3/2/2025 with worsening shortness of breath and chest tightness since being discharged from [facility] on 2/25/2025, where she was admitted on 2/15/2025 for complete heart block, acute kidney injury, and non-ST elevation myocardial infarction. She had mild improvement of symptoms after hemodialysis lasting several hours. She had associated dyspnea on exertion,  significant swelling of bilateral lower extremities and abdomen, orthopnea, wheezing, fatigue, and generalized weakness. Chest tightness was non-radiating, substernal, and worse with musculoskeletal manipulation and deep breathing, but unaffected by exertion and positioning. She had diffuse leg pain due to swelling, generalized abdominal discomfort described as dull, nagging pain, and nausea and vomiting. She has been anuric since starting dialysis. She had not missed any dialysis treatments.   In the emergency department, she had tachypnea and mild tachycardia. She was placed on 2 liters/minute of supplemental oxygen for comfort. Labs showed stable anemia and elevated WBC (68118/uL), bicarbonate 20 mmol/L, creatinine 2.1 mg/dL (baseline prior to starting dialysis was around 2.0), glucose 194 mg/dL, calcium 8.6 mg/dL, albumin 3.1 g/dL, BNP 1335 pg/mL, high sensitivity troponin 64 ng/L (repeat 57). EKG showed sinus rhythm with bigeminy, rate 70 bpm, no ST elevation or depression. Chest X-ray showed cardiomegaly with pulmonary edema and bilateral pleural effusions with aeration similar to mildly worse than 2/19/2025. Abdominal ultrasound showed cholelithiasis and gallbladder sludge without evidence of cholecystitis, distended gallbladder, hepatomegaly, bilateral pleural effusions. She was given acetaminophen and 100 mg of IV furosemide. She was admitted to Hospital Medicine Team S.    Overview/Hospital Course:  Nephrology was consulted for dialysis. She urinated 300 mL after furosemide. She had not urinated since started dialysis. She was put on IV furosemide 80 mg twice daily. She takes oxycodone 10 mg at home and requested something stronger. Oxycodone was increased to her home dose. On 3/5/2025, furosemide was changed to 80 mg by mouth, which was ineffective. It was changed to bumetanide 4 mg, which was also ineffective. Nephrology recommended Palliative Care consult for medication management of air hunger.  Palliative Care recommended oxycodone extended release 10 mg twice daily and oxycodone immediate release 10 mg every 6 hours as needed. On 3/7/2025 she reported numbness and tingling in her lower extremities that is chronic and intermittent (she has a prior diagnosis of peripheral neuropathy on her chart since 2015). Vitamin B12 level was high. Further evaluation of this can continue outpatient since she has had the problem for at least 10 years. By 3/9/2025, her net fluid status since admission was negative 10.878 liters. She reported shortness of breath while breathing through her mouth so was put on Venturi mask. Repeat chest X-ray on 3/10/2025 showed no change. On 3/11/2025, she requested Physical and Occupational Therapy evaluations. She reported that she was able to walk prior to her pacemaker placement, which was only 13 days prior to this admission, and has been debilitated since.     Interval History: She reported that she has been debilitated since her pacemaker placement last month and was able to ambulate on her own prior to that. She said that Therapy has not seen her since admission. Therapy was never consulted to see her. Will consult PT and OT.     Review of Systems   Constitutional:  Negative for chills and fever.   Musculoskeletal:  Positive for gait problem.   Neurological:  Positive for weakness. Negative for seizures and syncope.     Objective:     Vital Signs (Most Recent):  Temp: 98.5 °F (36.9 °C) (03/11/25 0821)  Pulse: 96 (03/11/25 0821)  Resp: 18 (03/11/25 0903)  BP: (!) 121/58 (03/11/25 0821)  SpO2: 99 % (03/11/25 0821) Vital Signs (24h Range):  Temp:  [97.2 °F (36.2 °C)-99.2 °F (37.3 °C)] 98.5 °F (36.9 °C)  Pulse:  [88-98] 96  Resp:  [16-20] 18  SpO2:  [97 %-100 %] 99 %  BP: (100-133)/(52-62) 121/58     Weight: 85.7 kg (189 lb)  Body mass index is 27.91 kg/m².    Intake/Output Summary (Last 24 hours) at 3/11/2025 1024  Last data filed at 3/10/2025 1838  Gross per 24 hour   Intake 500 ml    Output 2438 ml   Net -1938 ml             Physical Exam  Vitals and nursing note reviewed.   Constitutional:       General: She is not in acute distress.     Appearance: She is well-developed. She is not diaphoretic.      Interventions: She is not intubated.Nasal cannula in place.   Pulmonary:      Effort: Pulmonary effort is normal. No accessory muscle usage or respiratory distress. She is not intubated.   Neurological:      Mental Status: She is alert and oriented to person, place, and time. Mental status is at baseline.      Motor: No seizure activity.   Psychiatric:         Behavior: Behavior is not agitated, aggressive or hyperactive. Behavior is cooperative.               Significant Labs: All pertinent labs within the past 24 hours have been reviewed.    Significant Imaging: I have reviewed all pertinent imaging results/findings within the past 24 hours.      Assessment & Plan  Hypervolemia associated with renal insufficiency  Dialysis for fluid removal. Furosemide and bumetanide were ineffective.  Anxiety  -Chronic issue.  -PRN hydroxyzine.  Primary hypertension  Patient's blood pressure range in the last 24 hours was: BP  Min: 100/52  Max: 133/62.The patient's inpatient anti-hypertensive regimen is listed below:  Current Antihypertensives  isosorbide mononitrate 24 hr tablet 60 mg, Daily, Oral  metoprolol succinate (TOPROL-XL) 24 hr tablet 25 mg, Daily, Oral  hydrALAZINE tablet 50 mg, Every 8 hours, Oral    Plan  - BP is controlled, no changes needed to their regimen    3/4- /58   Pulse 98. HD planned today.   Follicular lymphoma  S/p chemo in 2010. In remission.   Mixed hyperlipidemia   Patient is chronically on statin.will continue for now. Monitor clinically. Last LDL was   Lab Results   Component Value Date    LDLCALC 105.6 01/14/2025      Coronary artery disease of native artery of native heart with stable angina pectoris  Continue home aspirin, atorvastatin, and ticagrelor.  Pulmonary  hypertension -- echo 11/2019  Chronic.    Peripheral vascular disease in diabetes mellitus -- CLEMENT 05/2019  -Chronic issue.  -Continue home aspirin and atorvastatin.  Acute on chronic diastolic heart failure  Volume removal with dialysis. Oral furosemide and bumetanide were not effective. Monitor intake/output, electrolytes.  Type 2 diabetes mellitus with stage 3b chronic kidney disease, with long-term current use of insulin  Patient's FSGs are controlled on current hypoglycemics.   Last A1c reviewed-   Lab Results   Component Value Date    HGBA1C 6.6 (H) 02/27/2025     Most recent fingerstick glucose reviewed-   Recent Labs   Lab 03/10/25  1145 03/10/25  2012 03/11/25  0823   POCTGLUCOSE 175* 170* 193*     Current correctional scale  Low  Maintain anti-hyperglycemic dose as follows-   Antihyperglycemics (From admission, onward)      Start     Stop Route Frequency Ordered    03/03/25 2100  insulin glargine U-100 (Lantus) pen 5 Units         -- SubQ Nightly 03/03/25 0233    03/03/25 0227  insulin aspart U-100 pen 0-5 Units         -- SubQ Before meals & nightly PRN 03/03/25 0128        -Accuchecks AC/HS  -Diabetic/renal diet  Aortic stenosis  New finding. Contributes to heart failure.  Constipation  Bisacodyl, senna-docusate, and lactulose prn.  Anemia  Anemia is likely due to chronic disease due to Chronic Kidney Disease. Most recent hemoglobin and hematocrit are listed below.  Recent Labs     03/10/25  1519   HGB 7.9*   HCT 25.6*       Plan  - Monitor serial CBC: Daily  - Transfuse PRBC if patient becomes hemodynamically unstable, symptomatic or H/H drops below 7/21.  - Patient has not received any PRBC transfusions to date  - Patient's anemia is currently stable  CHB (complete heart block)  S/p pacemaker    Acute kidney injury superimposed on stage 3b chronic kidney disease  ESRD (end stage renal disease)   On hemodialysis since 2/22/2025. Anuric since starting but was able to urinate 300 mL with 300 mg of IV  furosemide but none since. Nephrology is calling it ESRD now.    Baseline creatinine is unknown. Most recent creatinine and eGFR are listed below.  Recent Labs     03/08/25  1619 03/10/25  1519   CREATININE 1.9* 2.3*   EGFRNORACEVR 27.4* 21.8*      Plan  - Dialysis per Nephrology.  - Appreciate Palliative Care for medication management of air hunger per Nephrology recommendation.  Cardiac pacemaker in situ  2/17/2025. No acute issues.  GERD (gastroesophageal reflux disease)  Continue home pantoprazole.  Peripheral neuropathy  Previously diagnosed. Intermittent symptoms. B12 not low. Could be due to diabetes or prior chemotherapy. No need for further inpatient evaluation.    Palliative care encounter      Shortness of breath  Due to fluid overload. Assess for need for home oxygen at discharge.    Advanced care planning/counseling discussion      ESRD (end stage renal disease)  Creatine stable for now. BMP reviewed- noted Estimated Creatinine Clearance: 25.1 mL/min (A) (based on SCr of 2.3 mg/dL (H)). according to latest data. Based on current GFR, CKD stage is end stage.  Monitor UOP and serial BMP and adjust therapy as needed. Renally dose meds. Avoid nephrotoxic medications and procedures.  Physical debility  Reports debility since her pacemaker placement, which was 2 weeks prior to this admission. Consult PT and OT.   VTE Risk Mitigation (From admission, onward)           Ordered     heparin (porcine) injection 1,000 Units  As needed (PRN)         03/04/25 1302     heparin (porcine) injection 5,000 Units  Every 8 hours         03/03/25 0128     IP VTE HIGH RISK PATIENT  Once         03/03/25 0128     Place sequential compression device  Until discontinued         03/03/25 0128                    Discharge Planning   MIKHAIL: 3/12/2025     Code Status: Full Code   Medical Readiness for Discharge Date:   Discharge Plan A: Home, Home with family, Home Health   Discharge Delays: None known at this time            Please  place Justification for DME        Jairon Morales MD  Department of Hospital Medicine   [facility]

## 2025-03-11 NOTE — CONSULTS
[facility] - Med Surg    Wound Care     Patient Name:  Lorena Contreras  MRN:  [id number]  Date: 3/11/2025  Diagnosis: Hypervolemia associated with renal insufficiency     History:  Past Medical History:   Diagnosis Date    Age-related osteoporosis with current pathological fracture with routine healing 11/13/2024    Allergy     Altered mental status 06/19/2022    DYSARTHRIA, SPASTIC MOVEMENTS & DIFFICULTY SWALLOWING    Anemia     Anxiety     Arthritis     C. difficile colitis 09/29/2024    Currently treated with po vancomycin      Cataract     both removed    Colon polyps     Coronary artery disease     Depression     Diabetes mellitus, type II     Disorder of kidney and ureter     Epilepsia partialis continua 04/28/2023    Fibromyalgia     Follicular lymphoma     GERD (gastroesophageal reflux disease)     HTN (hypertension)     Hyperlipidemia     MI (myocardial infarction) 03/2019    Palliative care encounter 10/22/2021    Personal history of colonic polyps     Restless leg syndrome     Stroke      Social History[1]  Precautions:  Allergies as of 03/02/2025 - Reviewed 03/02/2025   Allergen Reaction Noted    Novolin 70/30 (semi-synthetic) Nausea And Vomiting 07/18/2016    Sulfa (sulfonamide antibiotics) Anaphylaxis 01/23/2014    Talwin [pentazocine lactate] Anaphylaxis 01/23/2014    Victoza [liraglutide] Nausea And Vomiting 07/30/2015    Glipizide Nausea Only 11/04/2016    Codeine  01/23/2014    Influenza virus vaccines Hives 06/01/2021    Iodine and iodide containing products Hives 10/25/2017    Levetiracetam Itching 04/28/2023    Lyrica [pregabalin] Hallucinations 12/30/2024    Neurontin [gabapentin]  08/20/2024    Rituxan [rituximab] Hives 01/23/2014    Zoloft [sertraline] Nausea And Vomiting 01/23/2014       WO Assessment Details / Treatment:    Patient seen for wound care: New Consult   Chart reviewed for this encounter.   Labs:   WBC (K/uL)   Date Value   03/10/2025 10.22   03/05/2025 16.29 (H)     Glucose  (mg/dL)   Date Value   03/10/2025 159 (H)   03/08/2025 149 (H)     Albumin (g/dL)   Date Value   03/10/2025 2.2 (L)   03/08/2025 2.2 (L)   02/27/2025 3.3 (L)     Rico Score: 14  Nutrition sub-score: 3  Chart review reveals pt is incontinent of bowel and bladder, PureWick in use.   Sacral wound POA this admission    Narrative:  Pt seen for WC consultation and agreed to assessment  Chart reviewed for this encounter.   See Flow Sheet for additional documentation and media.    Pt laying in bed on waffle overlay, waffle overlay was not fully inflated, additional air was added to ensure the proper air level for optimal patient immersion.Bedside nurse present for assessment.   Pt able to turn independently for buttock / perineal assessment revealing BM, foam border removed, feces under dressing. Pt cleansed with purple bath wipes, pink moist bilateral buttocks, likely incontinence related, sacral spine open wound appears with yellow moist necrotic tissue to wound bed, applied Triad - do not use foam border as pt is incontinent.   Left upper chest incision, well approximated, no open wound noted.   Under left breast fold appears moist and pink intertrigo, cleansed with purple bath wipes, air dried, applied InterdryAg.   Under right breast fold- no open wounds.   Right lateral foot contusion noted, no open wounds noted.   Bilateral heels assessed revealing clean, intact, blanchable erythema, no induration, no bogginess, no open wounds noted.  Bedside nurse to apply foam border dressing.     RECOMMENDATIONS:  Bedside nurse assess for acute changes (purulence, increased redness/swelling, increased drainage, malodor, increased pain, pallor, necrosis) please contact physician on any acute changes.    Nutrition consult  Ensure waffle is properly inflated  Ensure EHOB boots in place bilaterally  Ensure Triad is applied per orders  Ensure q2h turn protocol - Use purple wedge / pillow to ensure turning  Ensure pt is clean and dry    No foam borders if pt is incontinent  PIP Protocol    Discussed POC with patient and primary nurse.   See EMR for orders & patient education.     Discussed nutrition and the role of protein in wound healing with the patient. Instructed patient to optimize protein for wound healing.     Bedside nursing to continue care, dressing changes, & continue monitoring.  Bedside nursing to maintain pressure injury prevention interventions, (PIP).     Recommendations made to primary team for above plan.    Thank you for the consult. Wound Care will continue to follow.     03/11/25 1000   WOCN Assessment   WOCN Total Time (mins) 30   Visit Date 03/11/25   Visit Time 1000   Consult Type Follow Up   WOCN Speciality Wound   Wound pressure;surgical;moisture;At risk for pressure Injury   Intervention chart review;assessed;changed;applied;coordination of care;orders   Teaching on-going        Wound 02/17/25 0941 Incision Left Chest horizontal   Date First Assessed/Time First Assessed: 02/17/25 0941   Present on Original Admission: No  Primary Wound Type: Incision  Side: Left  Location: Chest  Incision Type: horizontal  Closure Method: Staples;Sutures  Additional Comments: telfa and tegaderm ...   Wound Image    Wound Edges Approximated   Wound Length (cm) 0 cm   Wound Width (cm) 0 cm   Wound Depth (cm) 0 cm   Wound Volume (cm^3) 0 cm^3   Wound Surface Area (cm^2) 0 cm^2        Wound 02/24/25 2330 Pressure Injury Sacral spine   Date First Assessed/Time First Assessed: 02/24/25 2330   Present on Original Admission: No  Primary Wound Type: Pressure Injury  Location: Sacral spine   Wound Image    Pressure Injury Stage U   Dressing Appearance Moist drainage   Drainage Amount Scant   Drainage Characteristics/Odor Serosanguineous;No odor   Appearance Yellow;Necrotic;Pink   Tissue loss description Not applicable   Periwound Area Intact;Pink;Scar tissue   Wound Edges Defined   Wound Length (cm) 1.8 cm   Wound Width (cm) 0.5 cm   Wound Depth  (cm) 0.3 cm   Wound Volume (cm^3) 0.141 cm^3   Wound Surface Area (cm^2) 0.71 cm^2   Care Cleansed with:;Antimicrobial agent   Dressing Applied;Other (comment)  (Triad)   Off Loading Other (see comments)  (on waffle)   Dressing Change Due 03/11/25        Wound 02/21/25 1429 Incision Right Neck other (see comments)   Date First Assessed/Time First Assessed: 02/21/25 1429   Present on Original Admission: No  Primary Wound Type: Incision  Side: Right  Location: Neck  Incision Type: (c) other (see comments)  Closure Method: Sutures;Steri-strips;Dermabond   Wound Length (cm) 0 cm   Wound Width (cm) 0 cm   Wound Depth (cm) 0 cm   Wound Volume (cm^3) 0 cm^3   Wound Surface Area (cm^2) 0 cm^2        Wound 02/24/25 2354 Intertrigo Left Breast   Date First Assessed/Time First Assessed: 02/24/25 2354   Primary Wound Type: Intertrigo  Side: Left  Location: Breast   Wound Image    Dressing Appearance Open to air   Drainage Amount Scant   Drainage Characteristics/Odor Malodorous   Appearance Pink;Red;Moist   Tissue loss description Partial thickness   Periwound Area Moist   Care Cleansed with:;Antimicrobial agent   Dressing Changed;Other (comment)  (Interdry Ag)   Periwound Care Dry periwound area maintained        Wound 03/06/25 1057 Contusion Left lateral Foot   Date First Assessed/Time First Assessed: 03/06/25 1057   Present on Original Admission: Yes  Primary Wound Type: Contusion  Side: Left  Orientation: lateral  Location: Foot   Dressing Appearance Open to air                        [1]   Social History  Socioeconomic History    Marital status:     Number of children: 2   Occupational History    Occupation: house wife    Occupation: [employer]   Tobacco Use    Smoking status: Never    Smokeless tobacco: Never   Substance and Sexual Activity    Alcohol use: Not Currently    Drug use: Never    Sexual activity: Not Currently     Partners: Male   Social History Narrative     2021.  2 dtr.  Lives with  GS.  3 cats and a dog.  Retired.  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  Lives in house, 1 story and 4 steps up and has a ramp.      Enjoys crafting.  Unable to bowl due to myalgias.       Social Drivers of Health     Financial Resource Strain: Low Risk  (3/4/2025)    Overall Financial Resource Strain (CARDIA)     Difficulty of Paying Living Expenses: Not hard at all   Recent Concern: Financial Resource Strain - Medium Risk (2/26/2025)    Overall Financial Resource Strain (CARDIA)     Difficulty of Paying Living Expenses: Somewhat hard   Food Insecurity: No Food Insecurity (3/4/2025)    Hunger Vital Sign     Worried About Running Out of Food in the Last Year: Never true     Ran Out of Food in the Last Year: Never true   Transportation Needs: Unmet Transportation Needs (2/26/2025)    PRAPARE - Transportation     Lack of Transportation (Medical): Yes     Lack of Transportation (Non-Medical): No   Physical Activity: Inactive (3/4/2025)    Exercise Vital Sign     Days of Exercise per Week: 0 days     Minutes of Exercise per Session: 0 min   Stress: Stress Concern Present (3/4/2025)    [place] [place] [employer] - Occupational Stress Questionnaire     Feeling of Stress : To some extent   Housing Stability: Low Risk  (3/4/2025)    Housing Stability Vital Sign     Unable to Pay for Housing in the Last Year: No     Number of Times Moved in the Last Year: 0     Homeless in the Last Year: No

## 2025-03-11 NOTE — ASSESSMENT & PLAN NOTE
Anemia is likely due to chronic disease due to Chronic Kidney Disease. Most recent hemoglobin and hematocrit are listed below.  Recent Labs     03/10/25  1519   HGB 7.9*   HCT 25.6*       Plan  - Monitor serial CBC: Daily  - Transfuse PRBC if patient becomes hemodynamically unstable, symptomatic or H/H drops below 7/21.  - Patient has not received any PRBC transfusions to date  - Patient's anemia is currently stable

## 2025-03-11 NOTE — ASSESSMENT & PLAN NOTE
Reports debility since her pacemaker placement, which was 2 weeks prior to this admission. Consult PT and OT.

## 2025-03-11 NOTE — PLAN OF CARE
SW met with patient to discuss discharge plan.  Pt reported she has been tired and unable to ambulate.  Pt reported she has been very weak.  Pt also prior to a week ago she was able to ambulate.  Pt not interested in SNF placement at this time and would prefer to d/c home with family.      PARAS spoke to pt's daughter Noelle [phone] regarding pt's d/c plan.  Daughter inquired about getting home O2 for patient.  SW advised pt's  daughter walk test would need to indicate the need for home O2 and pt would need a qualifying diagnosis.  Pt's daughter also inquired about hospital bed and isaac lift.  Order placed and forwarded to DME team for processing.      Pt current with [facility] and referred to acute care at home.  Pt also receives dialysis [facility] 9:45a.m. Nursing completed walk test and patient passed test.      Discharge Plan A and Plan B have been determined by review of patient's clinical status, future medical and therapeutic needs, and coverage/benefits for post-acute care in coordination with multidisciplinary team members.     03/11/25 1323   Medicare Message   Important Message from Medicare regarding Discharge Appeal Rights Given to patient/caregiver;Explained to patient/caregiver;Signed/date by patient/caregiver   Date IMM was signed 03/11/25   Time IMM was signed [id number]       Uzma Prieto LMSW  Part-Time-  [facility]  Ext. [phone]

## 2025-03-11 NOTE — SUBJECTIVE & OBJECTIVE
Interval History; Patient seen this morning no acute distress. Planning for HD tomorrow.       Objective:     Vital Signs (Most Recent):  Temp: 98.5 °F (36.9 °C) (03/11/25 0821)  Pulse: 93 (03/11/25 1050)  Resp: 18 (03/11/25 0903)  BP: (!) 121/58 (03/11/25 0821)  SpO2: 99 % (03/11/25 0821) Vital Signs (24h Range):  Temp:  [97.2 °F (36.2 °C)-99.2 °F (37.3 °C)] 98.5 °F (36.9 °C)  Pulse:  [88-98] 93  Resp:  [16-20] 18  SpO2:  [97 %-100 %] 99 %  BP: (100-133)/(52-62) 121/58     Weight: 85.7 kg (189 lb) (03/03/25 0700)  Body mass index is 27.91 kg/m².  Body surface area is 2.04 meters squared.    I/O last 3 completed shifts:  In: 500 [Other:500]  Out: 2438 [Other:2438]     Physical Exam  Cardiovascular:      Rate and Rhythm: Regular rhythm.      Heart sounds: Normal heart sounds.   Musculoskeletal:         General: Swelling present.   Neurological:      Mental Status: She is alert. Mental status is at baseline.   Psychiatric:         Mood and Affect: Mood normal.          Significant Labs:  CBC:   Recent Labs   Lab 03/10/25  1519   WBC 10.22   RBC 2.79*   HGB 7.9*   HCT 25.6*      MCV 92   MCH 28.3   MCHC 30.9*     CMP:   Recent Labs   Lab 03/10/25  1519   *   CALCIUM 8.2*   ALBUMIN 2.2*   PROT 5.8*   *   K 3.5   CO2 25      BUN 21   CREATININE 2.3*   ALKPHOS 123   ALT 11   AST 23   BILITOT 0.4     All labs within the past 24 hours have been reviewed.

## 2025-03-12 ENCOUNTER — PATIENT OUTREACH (OUTPATIENT)
Facility: OTHER | Age: 75
End: 2025-03-12
Payer: MEDICARE

## 2025-03-12 ENCOUNTER — PATIENT MESSAGE (OUTPATIENT)
Dept: FAMILY MEDICINE | Facility: CLINIC | Age: 75
End: 2025-03-12
Payer: MEDICARE

## 2025-03-12 ENCOUNTER — DOCUMENTATION ONLY (OUTPATIENT)
Dept: CARDIOLOGY | Facility: CLINIC | Age: 75
End: 2025-03-12
Payer: MEDICARE

## 2025-03-12 VITALS
SYSTOLIC BLOOD PRESSURE: 108 MMHG | WEIGHT: 189 LBS | OXYGEN SATURATION: 96 % | BODY MASS INDEX: 27.99 KG/M2 | HEART RATE: 84 BPM | HEIGHT: 69 IN | RESPIRATION RATE: 17 BRPM | DIASTOLIC BLOOD PRESSURE: 57 MMHG | TEMPERATURE: 98 F

## 2025-03-12 PROBLEM — N28.9 HYPERVOLEMIA ASSOCIATED WITH RENAL INSUFFICIENCY: Status: RESOLVED | Noted: 2025-03-03 | Resolved: 2025-03-12

## 2025-03-12 PROBLEM — Z51.5 PALLIATIVE CARE ENCOUNTER: Status: RESOLVED | Noted: 2021-10-22 | Resolved: 2025-03-12

## 2025-03-12 PROBLEM — Z71.89 GOALS OF CARE, COUNSELING/DISCUSSION: Status: RESOLVED | Noted: 2025-03-07 | Resolved: 2025-03-12

## 2025-03-12 PROBLEM — Z71.89 ADVANCED CARE PLANNING/COUNSELING DISCUSSION: Status: RESOLVED | Noted: 2025-03-07 | Resolved: 2025-03-12

## 2025-03-12 PROBLEM — E87.70 HYPERVOLEMIA ASSOCIATED WITH RENAL INSUFFICIENCY: Status: RESOLVED | Noted: 2025-03-03 | Resolved: 2025-03-12

## 2025-03-12 LAB — POCT GLUCOSE: 279 MG/DL (ref 70–110)

## 2025-03-12 PROCEDURE — 97112 NEUROMUSCULAR REEDUCATION: CPT | Mod: HCNC

## 2025-03-12 PROCEDURE — 25000003 PHARM REV CODE 250: Mod: HCNC | Performed by: HOSPITALIST

## 2025-03-12 PROCEDURE — 63600175 PHARM REV CODE 636 W HCPCS: Mod: HCNC | Performed by: NURSE PRACTITIONER

## 2025-03-12 PROCEDURE — 97162 PT EVAL MOD COMPLEX 30 MIN: CPT | Mod: HCNC

## 2025-03-12 PROCEDURE — 97530 THERAPEUTIC ACTIVITIES: CPT | Mod: HCNC

## 2025-03-12 PROCEDURE — 97535 SELF CARE MNGMENT TRAINING: CPT | Mod: HCNC

## 2025-03-12 PROCEDURE — 97166 OT EVAL MOD COMPLEX 45 MIN: CPT | Mod: HCNC

## 2025-03-12 PROCEDURE — 25000003 PHARM REV CODE 250: Mod: HCNC | Performed by: NURSE PRACTITIONER

## 2025-03-12 PROCEDURE — 25000242 PHARM REV CODE 250 ALT 637 W/ HCPCS: Mod: HCNC | Performed by: NURSE PRACTITIONER

## 2025-03-12 RX ORDER — OXYCODONE HYDROCHLORIDE 10 MG/1
10 TABLET ORAL EVERY 6 HOURS PRN
Qty: 20 TABLET | Refills: 0 | Status: SHIPPED | OUTPATIENT
Start: 2025-03-12 | End: 2025-03-17 | Stop reason: SDUPTHER

## 2025-03-12 RX ORDER — OXYCODONE HYDROCHLORIDE 10 MG/1
10 TABLET ORAL EVERY 12 HOURS PRN
Start: 2025-03-12 | End: 2025-03-12

## 2025-03-12 RX ORDER — OXYCODONE HYDROCHLORIDE 10 MG/1
10 TABLET ORAL EVERY 6 HOURS PRN
Qty: 20 TABLET | Refills: 0 | Status: SHIPPED | OUTPATIENT
Start: 2025-03-12 | End: 2025-03-12

## 2025-03-12 RX ADMIN — HYDROXYZINE HYDROCHLORIDE 25 MG: 25 TABLET ORAL at 10:03

## 2025-03-12 RX ADMIN — FLUOXETINE HYDROCHLORIDE 40 MG: 20 CAPSULE ORAL at 08:03

## 2025-03-12 RX ADMIN — LORAZEPAM 1 MG: 1 TABLET ORAL at 08:03

## 2025-03-12 RX ADMIN — ASPIRIN 81 MG CHEWABLE TABLET 81 MG: 81 TABLET CHEWABLE at 08:03

## 2025-03-12 RX ADMIN — TICAGRELOR 60 MG: 60 TABLET ORAL at 08:03

## 2025-03-12 RX ADMIN — HEPARIN SODIUM 5000 UNITS: 5000 INJECTION INTRAVENOUS; SUBCUTANEOUS at 05:03

## 2025-03-12 RX ADMIN — OXYCODONE HYDROCHLORIDE 10 MG: 10 TABLET, FILM COATED, EXTENDED RELEASE ORAL at 08:03

## 2025-03-12 RX ADMIN — HYDRALAZINE HYDROCHLORIDE 50 MG: 25 TABLET ORAL at 05:03

## 2025-03-12 RX ADMIN — THERA TABS 1 TABLET: TAB at 08:03

## 2025-03-12 RX ADMIN — OXYCODONE HYDROCHLORIDE 10 MG: 10 TABLET ORAL at 10:03

## 2025-03-12 RX ADMIN — LOPERAMIDE HYDROCHLORIDE 2 MG: 2 CAPSULE ORAL at 10:03

## 2025-03-12 RX ADMIN — PANTOPRAZOLE SODIUM 40 MG: 40 TABLET, DELAYED RELEASE ORAL at 08:03

## 2025-03-12 NOTE — SUBJECTIVE & OBJECTIVE
Interval History: Plan to go home with home health and DME today.    Review of Systems   Constitutional:  Negative for chills and fever.   Musculoskeletal:  Positive for gait problem.   Neurological:  Positive for weakness. Negative for seizures and syncope.     Objective:     Vital Signs (Most Recent):  Temp: 98 °F (36.7 °C) (03/12/25 0733)  Pulse: 83 (03/12/25 0733)  Resp: 18 (03/12/25 0733)  BP: 100/63 (03/12/25 0733)  SpO2: 96 % (03/12/25 0733) Vital Signs (24h Range):  Temp:  [97.8 °F (36.6 °C)-99.1 °F (37.3 °C)] 98 °F (36.7 °C)  Pulse:  [67-97] 83  Resp:  [18-20] 18  SpO2:  [91 %-100 %] 96 %  BP: (100-133)/(51-63) 100/63     Weight: 85.7 kg (189 lb)  Body mass index is 27.91 kg/m².  No intake or output data in the 24 hours ending 03/12/25 0825            Physical Exam  Vitals and nursing note reviewed.   Constitutional:       General: She is not in acute distress.     Appearance: She is well-developed. She is not diaphoretic.      Interventions: She is not intubated.Nasal cannula in place.   Pulmonary:      Effort: Pulmonary effort is normal. No accessory muscle usage or respiratory distress. She is not intubated.   Neurological:      Mental Status: She is alert and oriented to person, place, and time. Mental status is at baseline.      Motor: No seizure activity.   Psychiatric:         Behavior: Behavior is not agitated, aggressive or hyperactive. Behavior is cooperative.               Significant Labs: All pertinent labs within the past 24 hours have been reviewed.    Significant Imaging: I have reviewed all pertinent imaging results/findings within the past 24 hours.

## 2025-03-12 NOTE — PROGRESS NOTES
AVS virtually reviewed with patient in its entirety with emphasis on diet, medications, follow-up appointments and reasons to return to the ED or contact the [facility] On Call Nurse Care Line. Patient also encouraged to utilize their patient portal. Ease and convenience of use reiterated. Education complete and patient voiced understanding. All questions answered. Discharge teaching complete. Encouraged to complete patient survey.  Active with patient portal. Educated on fluid and sodium restrictions and importance of compliance.  Family at bedside.

## 2025-03-12 NOTE — ASSESSMENT & PLAN NOTE
Patient's FSGs are controlled on current hypoglycemics.   Last A1c reviewed-   Lab Results   Component Value Date    HGBA1C 6.6 (H) 02/27/2025     Most recent fingerstick glucose reviewed-   Recent Labs   Lab 03/11/25  1221 03/11/25  1640 03/11/25 2039   POCTGLUCOSE 247* 279* 248*     Current correctional scale  Low  Maintain anti-hyperglycemic dose as follows-   Antihyperglycemics (From admission, onward)      Start     Stop Route Frequency Ordered    03/03/25 2100  insulin glargine U-100 (Lantus) pen 5 Units         -- SubQ Nightly 03/03/25 0233    03/03/25 0227  insulin aspart U-100 pen 0-5 Units         -- SubQ Before meals & nightly PRN 03/03/25 0128        -Accuchecks AC/HS  -Diabetic/renal diet

## 2025-03-12 NOTE — PROGRESS NOTES
[facility] Medicine  Progress Note    Patient Name: Lroena Contreras  MRN: [id number]  Patient Class: IP- Inpatient   Admission Date: 3/2/2025  Length of Stay: 9 days  Attending Physician: Jairon Morales MD  Primary Care Provider: Jorge Paris MD        Subjective     Principal Problem:Hypervolemia associated with renal insufficiency        HPI:  Lorena Contreras is a 74 year old white woman with hypertension, hyperlipidemia, diabetes mellitus type 2, coronary artery disease, history of stroke, complete heart block status post dual chamber cardiac pacemaker placed 2/17/2025, pulmonary hypertension, chronic diastolic heart failure, peripheral vascular disease, chronic kidney disease stage 3b (on hemodialysis since 2/22/2025), anemia, follicular lymphoma diagnosed in 2009 treated with chemotherapy, gastroesophageal reflux disease, constipation, restless legs syndrome, fibromyalgia, anxiety, depression, history of right elbow dislocation repair in 2015, history of incision and drainage of right foot on 6/2/2021, history of open reduction and internal fixation of right pilon fracture on 8/14/2024 and right acetabulum fracture on 8/16/2024, history of right medial ankle fasciocutaneous flap and excisional debridement of left ankle medial wound on 9/25/2024, history of tonsillectomy in 1955. She uses a rolling walker and wheelchair to assist mobility. She lives in [place], [place]. She is . Her primary care physician is Dr. Jorge Paris.    She presented to [facility] Emergency Department on 3/2/2025 with worsening shortness of breath and chest tightness since being discharged from [facility] on 2/25/2025, where she was admitted on 2/15/2025 for complete heart block, acute kidney injury, and non-ST elevation myocardial infarction. She had mild improvement of symptoms after hemodialysis lasting several hours. She had associated dyspnea on exertion,  significant swelling of bilateral lower extremities and abdomen, orthopnea, wheezing, fatigue, and generalized weakness. Chest tightness was non-radiating, substernal, and worse with musculoskeletal manipulation and deep breathing, but unaffected by exertion and positioning. She had diffuse leg pain due to swelling, generalized abdominal discomfort described as dull, nagging pain, and nausea and vomiting. She has been anuric since starting dialysis. She had not missed any dialysis treatments.   In the emergency department, she had tachypnea and mild tachycardia. She was placed on 2 liters/minute of supplemental oxygen for comfort. Labs showed stable anemia and elevated WBC (94075/uL), bicarbonate 20 mmol/L, creatinine 2.1 mg/dL (baseline prior to starting dialysis was around 2.0), glucose 194 mg/dL, calcium 8.6 mg/dL, albumin 3.1 g/dL, BNP 1335 pg/mL, high sensitivity troponin 64 ng/L (repeat 57). EKG showed sinus rhythm with bigeminy, rate 70 bpm, no ST elevation or depression. Chest X-ray showed cardiomegaly with pulmonary edema and bilateral pleural effusions with aeration similar to mildly worse than 2/19/2025. Abdominal ultrasound showed cholelithiasis and gallbladder sludge without evidence of cholecystitis, distended gallbladder, hepatomegaly, bilateral pleural effusions. She was given acetaminophen and 100 mg of IV furosemide. She was admitted to Hospital Medicine Team S.    Overview/Hospital Course:  Nephrology was consulted for dialysis. She urinated 300 mL after furosemide. She had not urinated since started dialysis. She was put on IV furosemide 80 mg twice daily. She takes oxycodone 10 mg at home and requested something stronger. Oxycodone was increased to her home dose. On 3/5/2025, furosemide was changed to 80 mg by mouth, which was ineffective. It was changed to bumetanide 4 mg, which was also ineffective. Nephrology recommended Palliative Care consult for medication management of air hunger.  Palliative Care recommended oxycodone extended release 10 mg twice daily and oxycodone immediate release 10 mg every 6 hours as needed. On 3/7/2025 she reported numbness and tingling in her lower extremities that is chronic and intermittent (she has a prior diagnosis of peripheral neuropathy on her chart since 2015). Vitamin B12 level was high. Further evaluation of this can continue outpatient since she has had the problem for at least 10 years. By 3/9/2025, her net fluid status since admission was negative 10.878 liters. She reported shortness of breath while breathing through her mouth so was put on Venturi mask. Repeat chest X-ray on 3/10/2025 showed no change. On 3/11/2025, she requested Physical and Occupational Therapy evaluations. She reported that she was able to walk prior to her pacemaker placement, which was only 13 days prior to this admission, and has been debilitated since. She was weaned off nasal cannula on 3/11/2025. Home health was set up with physical therapy, occupational therapy, hospital bed and Nighat lift.     Interval History: Plan to go home with home health and DME today.    Review of Systems   Constitutional:  Negative for chills and fever.   Musculoskeletal:  Positive for gait problem.   Neurological:  Positive for weakness. Negative for seizures and syncope.     Objective:     Vital Signs (Most Recent):  Temp: 98 °F (36.7 °C) (03/12/25 0733)  Pulse: 83 (03/12/25 0733)  Resp: 18 (03/12/25 0733)  BP: 100/63 (03/12/25 0733)  SpO2: 96 % (03/12/25 0733) Vital Signs (24h Range):  Temp:  [97.8 °F (36.6 °C)-99.1 °F (37.3 °C)] 98 °F (36.7 °C)  Pulse:  [67-97] 83  Resp:  [18-20] 18  SpO2:  [91 %-100 %] 96 %  BP: (100-133)/(51-63) 100/63     Weight: 85.7 kg (189 lb)  Body mass index is 27.91 kg/m².  No intake or output data in the 24 hours ending 03/12/25 0825            Physical Exam  Vitals and nursing note reviewed.   Constitutional:       General: She is not in acute distress.     Appearance:  She is well-developed. She is not diaphoretic.      Interventions: She is not intubated.Nasal cannula in place.   Pulmonary:      Effort: Pulmonary effort is normal. No accessory muscle usage or respiratory distress. She is not intubated.   Neurological:      Mental Status: She is alert and oriented to person, place, and time. Mental status is at baseline.      Motor: No seizure activity.   Psychiatric:         Behavior: Behavior is not agitated, aggressive or hyperactive. Behavior is cooperative.               Significant Labs: All pertinent labs within the past 24 hours have been reviewed.    Significant Imaging: I have reviewed all pertinent imaging results/findings within the past 24 hours.      Assessment & Plan  Anxiety  -Chronic issue.  -PRN hydroxyzine.  Primary hypertension  Patient's blood pressure range in the last 24 hours was: BP  Min: 100/63  Max: 133/52.The patient's inpatient anti-hypertensive regimen is listed below:  Current Antihypertensives  isosorbide mononitrate 24 hr tablet 60 mg, Daily, Oral  metoprolol succinate (TOPROL-XL) 24 hr tablet 25 mg, Daily, Oral  hydrALAZINE tablet 50 mg, Every 8 hours, Oral  hydrALAZINE (APRESOLINE) tablet, Every 8 hours, Oral    Plan  - BP is controlled, no changes needed to their regimen  Follicular lymphoma  S/p chemo in 2010. In remission.   Mixed hyperlipidemia   Patient is chronically on statin.will continue for now. Monitor clinically. Last LDL was   Lab Results   Component Value Date    LDLCALC 105.6 01/14/2025      Coronary artery disease of native artery of native heart with stable angina pectoris  Continue home aspirin, atorvastatin, and ticagrelor.  Pulmonary hypertension -- echo 11/2019  Chronic.    Peripheral vascular disease in diabetes mellitus -- CLEMENT 05/2019  -Chronic issue.  -Continue home aspirin and atorvastatin.  Acute on chronic diastolic heart failure  Volume removal with dialysis. Oral furosemide and bumetanide were not effective. Weaned  off supplemental oxygen so medically ready for discharge. Monitor intake/output, electrolytes.  Type 2 diabetes mellitus with stage 3b chronic kidney disease, with long-term current use of insulin  Patient's FSGs are controlled on current hypoglycemics.   Last A1c reviewed-   Lab Results   Component Value Date    HGBA1C 6.6 (H) 02/27/2025     Most recent fingerstick glucose reviewed-   Recent Labs   Lab 03/11/25  1221 03/11/25  1640 03/11/25  2039   POCTGLUCOSE 247* 279* 248*     Current correctional scale  Low  Maintain anti-hyperglycemic dose as follows-   Antihyperglycemics (From admission, onward)      Start     Stop Route Frequency Ordered    03/03/25 2100  insulin glargine U-100 (Lantus) pen 5 Units         -- SubQ Nightly 03/03/25 0233    03/03/25 0227  insulin aspart U-100 pen 0-5 Units         -- SubQ Before meals & nightly PRN 03/03/25 0128        -Accuchecks AC/HS  -Diabetic/renal diet  Aortic stenosis  New finding. Contributes to heart failure.  Constipation  Bisacodyl, senna-docusate, and lactulose prn.  Anemia  Anemia is likely due to chronic disease due to Chronic Kidney Disease. Most recent hemoglobin and hematocrit are listed below.  Recent Labs     03/10/25  1519   HGB 7.9*   HCT 25.6*       Plan  - Monitor serial CBC: Daily  - Transfuse PRBC if patient becomes hemodynamically unstable, symptomatic or H/H drops below 7/21.  - Patient has not received any PRBC transfusions to date  - Patient's anemia is currently stable  CHB (complete heart block)  S/p pacemaker    Acute kidney injury superimposed on stage 3b chronic kidney disease  ESRD (end stage renal disease)   On hemodialysis since 2/22/2025. Anuric since starting but was able to urinate 300 mL with 300 mg of IV furosemide but none since. Nephrology is calling it ESRD now.    Baseline creatinine is unknown. Most recent creatinine and eGFR are listed below.  Recent Labs     03/10/25  1519   CREATININE 2.3*   EGFRNORACEVR 21.8*      Plan  -  Dialysis per Nephrology.  - Appreciate Palliative Care for medication management of air hunger per Nephrology recommendation.  Cardiac pacemaker in situ  2/17/2025. No acute issues.  GERD (gastroesophageal reflux disease)  Continue home pantoprazole.  Peripheral neuropathy  Previously diagnosed. Intermittent symptoms. B12 not low. Could be due to diabetes or prior chemotherapy. No need for further inpatient evaluation.    ESRD (end stage renal disease)  Creatine stable for now. BMP reviewed- noted Estimated Creatinine Clearance: 25.1 mL/min (A) (based on SCr of 2.3 mg/dL (H)). according to latest data. Based on current GFR, CKD stage is end stage.  Monitor UOP and serial BMP and adjust therapy as needed. Renally dose meds. Avoid nephrotoxic medications and procedures.  Physical debility  Reports debility since her pacemaker placement, which was 2 weeks prior to this admission. Consulted PT and OT. Home health.    Lorena requires a hospital bed due to her requiring positioning of the body in ways not feasible with an ordinary bed due to limited ability and cannot independently make changes in body position without the use of the bed.The positioning of the body cannot be sufficiently resolved by the use of pillows and wedges.Her hospital bed is broken.    She requires a patient lift due to being bed bound and unable to ambulate.    VTE Risk Mitigation (From admission, onward)           Ordered     heparin (porcine) injection 1,000 Units  As needed (PRN)         03/04/25 1302     heparin (porcine) injection 5,000 Units  Every 8 hours         03/03/25 0128     IP VTE HIGH RISK PATIENT  Once         03/03/25 0128     Place sequential compression device  Until discontinued         03/03/25 0128                    Discharge Planning   MIKHAIL: 3/12/2025     Code Status: Full Code   Medical Readiness for Discharge Date: 3/12/2025  Discharge Plan A: Home, Home with family, Home Health   Discharge Delays: None known at this  time            Please place Justification for DME        Jairon Morales MD  Department of Hospital Medicine   [facility]

## 2025-03-12 NOTE — PLAN OF CARE
Problem: Adult Inpatient Plan of Care  Goal: Plan of Care Review  Outcome: Met  Goal: Patient-Specific Goal (Individualized)  Outcome: Met  Goal: Absence of Hospital-Acquired Illness or Injury  Outcome: Met  Goal: Optimal Comfort and Wellbeing  Outcome: Met  Goal: Readiness for Transition of Care  Outcome: Met     Problem: Skin Injury Risk Increased  Goal: Skin Health and Integrity  Outcome: Met     Problem: Infection  Goal: Absence of Infection Signs and Symptoms  Outcome: Met     Problem: Hemodialysis  Goal: Safe, Effective Therapy Delivery  Outcome: Met  Goal: Effective Tissue Perfusion  Outcome: Met  Goal: Absence of Infection Signs and Symptoms  Outcome: Met     Problem: Diabetes Comorbidity  Goal: Blood Glucose Level Within Targeted Range  Outcome: Met     Problem: Acute Kidney Injury/Impairment  Goal: Fluid and Electrolyte Balance  Outcome: Met  Goal: Improved Oral Intake  Outcome: Met  Goal: Effective Renal Function  Outcome: Met     Problem: Wound  Goal: Optimal Coping  Outcome: Met  Goal: Optimal Functional Ability  Outcome: Met  Goal: Absence of Infection Signs and Symptoms  Outcome: Met  Goal: Improved Oral Intake  Outcome: Met  Goal: Optimal Pain Control and Function  Outcome: Met  Goal: Skin Health and Integrity  Outcome: Met  Goal: Optimal Wound Healing  Outcome: Met     Problem: Fall Injury Risk  Goal: Absence of Fall and Fall-Related Injury  Outcome: Met     Problem: Chronic Kidney Disease  Goal: Electrolyte Balance  Outcome: Met  Goal: Fluid Balance  Outcome: Met     Problem: Coping Ineffective  Goal: Effective Coping  Outcome: Met

## 2025-03-12 NOTE — PLAN OF CARE
David Mijares - Med Surg  Discharge Final Note    Primary Care Provider: Jorge Paris MD    Expected Discharge Date: 3/12/2025    SW met with pt, daughter (Noelle), and pt's sister to finalized d/c plan.  Pt will d/c home and resume HH w/ [facility].  Pt was weaned off O2.  Per nursing staff, pt does not qualify for home O2 at this time.  Therapy worked with patient and recommended low intensity.  Family comfortable with patient being able to transfer from bed to chair.  Pt's family is agreeable to patient discharging home with HH.   Family refused isaac lift at this time.  Medical team completed repair order for hospital bed.  Pt's daughter was provided contact information [phone] for hospital bed repair and schedule.      Discharge Plan A and Plan B have been determined by review of patient's clinical status, future medical and therapeutic needs, and coverage/benefits for post-acute care in coordination with multidisciplinary team members.    Future Appointments   Date Time Provider Department Center   3/13/2025  9:45 AM CHAIR 08, [facility]   3/15/2025  9:45 AM CHAIR 08, [facility]   3/17/2025  9:30 AM Ann Deshpande PA-C [facility]   3/18/2025  9:45 AM CHAIR 08, [facility]   3/20/2025  9:45 AM CHAIR 08, [facility]   3/22/2025  9:45 AM CHAIR 08, [facility]   3/25/2025  9:45 AM CHAIR 08, [facility]   3/27/2025  9:45 AM CHAIR 08, [facility] [place] [facility]   3/29/2025  9:45 AM CHAIR 08, [facility]   4/1/2025  9:45 AM CHAIR 08, [facility]   4/3/2025  9:45 AM CHAIR 08, [facility]   4/5/2025  9:45 AM [facility]   4/8/2025  9:45  AM CHAIR 05, [facility]   4/10/2025  9:45 AM CHAIR 05, [facility] [place] [facility]   4/12/2025  9:45 AM CHAIR 01, [facility] [place] [facility]   4/14/2025  9:00 AM Jorge Paris MD [facility]   4/15/2025  9:45 AM CHAIR 01, [facility] [place] [facility]   4/17/2025  9:45 AM CHAIR 01, [facility]   4/19/2025  9:45 AM CHAIR 01, [facility]   4/22/2025  9:45 AM CHAIR 01, [facility]   4/24/2025  9:45 AM CHAIR 01, [facility]   4/26/2025  9:45 AM CHAIR 01, [facility]   4/29/2025  9:45 AM CHAIR 01, [facility]   5/1/2025  9:00 AM EMG, [facility]   5/1/2025 12:30 PM CHAIR 08, [facility]   5/3/2025  9:45 AM CHAIR 13, [facility]   5/6/2025  9:45 AM CHAIR 08, [facility]   5/8/2025  9:45 AM CHAIR 13, [facility]   5/9/2025 10:00 AM LAB, [facility]   5/10/2025  9:45 AM CHAIR 13, [facility]   5/13/2025  9:45 AM CHAIR 08, [facility]   5/15/2025  9:45 AM CHAIR 13, [facility] [place] [facility]   5/16/2025  9:00 AM Gerardo Barbour MD [facility] [place]   5/17/2025  9:45 AM CHAIR 13, [place] [facility]   5/20/2025  9:45 AM CHAIR 13, [place] [facility]   5/22/2025  9:45 AM CHAIR 13, [place] [facility]   5/24/2025  9:45 AM CHAIR 13, [place] [facility]   5/27/2025  9:45 AM CHAIR 13, [place] [facility]   5/29/2025  9:45 AM CHAIR 13, [place] [facility]   5/31/2025  9:45 AM CHAIR 10, [facility]   6/3/2025  9:45 AM CHAIR 05, [facility]   6/5/2025  9:45 AM CHAIR 05, [facility]   6/7/2025  9:45 AM CHAIR 05, [facility]   6/10/2025  9:45 AM CHAIR 05, [facility]   6/12/2025  9:45 AM CHAIR 05, [facility]   6/14/2025  9:45 AM CHAIR 05, [facility]   6/17/2025  9:45 AM CHAIR 05, [facility]   6/19/2025  9:45 AM CHAIR 05, [facility]   6/21/2025  9:45 AM CHAIR 05, [facility]   6/24/2025  9:45 AM CHAIR 05, [facility]   6/26/2025  9:45 AM CHAIR 05, [facility]   6/28/2025  9:45 AM CHAIR 05, [facility]         Final Discharge Note (most recent)       Final Note - 03/12/25 1543          Final Note    Assessment Type Final Discharge Note     Anticipated Discharge Disposition Home-Health Care [facility]     Hospital Resources/Appts/Education Provided Provided patient/caregiver with written discharge plan information;Post-Acute resouces added to AVS        Post-Acute Status    Post-Acute Authorization Home Health     Home Health Status Set-up Complete/Auth obtained   [facility]    Discharge Delays None known at this time                     Important Message from Medicare  Important Message from Medicare regarding Discharge Appeal Rights: (P) Given to patient/caregiver, Explained to patient/caregiver, Signed/date by patient/caregiver     Date IMM was signed: (P) 03/11/25  Time IMM was signed: (P) 7601     Follow-up providers       Jorge Paris MD   Specialty: Internal Medicine, Pediatrics   Relationship: PCP - General    [address] [address]   Phone: [phone]       Next Steps: Follow up              After-discharge care                Home Medical Care       *[facility]   Service: Home Health Services    [id number]  [place] [address]   Phone: [phone]                             Uzma Prieto LMSW  Part-Time-  [facility]  Ext. [phone]

## 2025-03-12 NOTE — PLAN OF CARE
Problem: Occupational Therapy  Goal: Occupational Therapy Goal  Description: Goals to be met by: 4/12/25     Patient will increase functional independence with ADLs by performing:    UE Dressing with Set-up Assistance.  LE Dressing with Set-up Assistance.  Grooming while seated at sink with Set-up Assistance.  Toileting from bedside commode with Set-up Assistance for hygiene and clothing management.   Supine to sit with Modified [place].  Stand pivot transfers with Stand-by Assistance.  Toilet transfer to bedside commode with Stand-by Assistance.  Increased functional strength to WFL for BUE.    Outcome: Progressing

## 2025-03-12 NOTE — PROGRESS NOTES
[facility] Failure [facility] (HFTCC) Team notified of pt referral via Ambulatory Referral to Heart Failure Transitional Care ([id number]).    Patient screened today 3/12/2025 by provider and LPN for enrollment to program.      Pt was deemed not a candidate for enrollment at this time related to patient is currently on hemo-dialysis. ESRD.    Pt will require additional follow up planning per primary team.     If pt status, diagnosis, or treatment plan changes , please place AMB referral to Heart Failure Transitional Care Clinic ([id number]) for HFTCC enrollment re-evalution.

## 2025-03-12 NOTE — ASSESSMENT & PLAN NOTE
Reports debility since her pacemaker placement, which was 2 weeks prior to this admission. Consulted PT and OT. Home health.    Lorena requires a hospital bed due to her requiring positioning of the body in ways not feasible with an ordinary bed due to limited ability and cannot independently make changes in body position without the use of the bed.The positioning of the body cannot be sufficiently resolved by the use of pillows and wedges.Her hospital bed is broken.    She requires a patient lift due to being bed bound and unable to ambulate.

## 2025-03-12 NOTE — PT/OT/SLP EVAL
Occupational Therapy   CO-Evaluation  Co-evaluation/treatment performed due to patient's multiple deficits requiring two skilled therapists to appropriately and safely assess patient's strength, endurance, functional mobility, and ADL performance while facilitating functional tasks in addition to accommodating for patient's activity tolerance and medical acuity.     Name: Lorena Contreras  MRN: [id number]  Admitting Diagnosis: Hypervolemia associated with renal insufficiency  Recent Surgery: * No surgery found *      Recommendations:     Discharge Recommendations: Low Intensity Therapy  Discharge Equipment Recommendations:  none  Barriers to discharge:  None    Assessment:     Lorena Contreras is a 74 y.o. female with a medical diagnosis of Hypervolemia associated with renal insufficiency.  She presents with good participation with presented therapeutic assessments, however limited by anxious thought/feelings regarding OOB activities. Pt with overall generalized weakness, impaired activity tolerance, and dizziness upon functional mobility assessments. Pt required increased time and energy conservation breaks for all activities today. Pt is currently not at her PLOF and would greatly benefit from continued skilled OT intervention in efforts to participate in meaningful occupations of choice at the highest level of independence. Performance deficits affecting function: weakness, impaired endurance, impaired self care skills, impaired functional mobility, impaired balance, pain, decreased safety awareness, decreased lower extremity function, decreased upper extremity function, impaired fine motor.      Pt would greatly benefit from low intensity OT services upon discharge from this facility in efforts to progress to PLOF regarding meaningful occupations and home/community reintegration.      Rehab Prognosis: Good; patient would benefit from acute skilled OT services to address these deficits and reach maximum  "level of function.       Plan:     Patient to be seen 3 x/week to address the above listed problems via self-care/home management, therapeutic activities, therapeutic exercises, neuromuscular re-education  Plan of Care Expires: 04/12/25  Plan of Care Reviewed with: patient, sibling, daughter    Subjective     Chief Complaint: "I'm just so weak", feeling dizzy  Patient/Family Comments/goals: pt and pt's family hopeful for discharge to home setting    Occupational Profile:  Living Environment: pt lives with her sister [place] [facility] with tub/shwr combo  Previous level of function: 1 month ago pt was mod-I with ADL/IADLs/functional mobility, however has required increased physical A lately  Roles and Routines: none stated  Equipment Used at Home: bath bench, bedside commode, wheelchair, rollator, walker, rolling  Assistance upon Discharge: daughter, sister, and nephew     Pain/Comfort:  Pain Rating 1: other (see comments) (not rated)  Location - Orientation 1: generalized  Location 1: back  Pain Addressed 1: Reposition, Distraction    Patients cultural, spiritual, Shinto conflicts given the current situation: no    Objective:     Communicated with: RN prior to session.  Patient found supine with telemetry upon OT entry to room.    General Precautions: Standard, fall  Orthopedic Precautions: N/A  Braces: N/A  Respiratory Status: Room air    Occupational Performance:    Bed Mobility:  HOB elevated and use of bed rails  Patient completed Rolling/Turning to Left with  stand by assistance  Patient completed Scooting/Bridging with stand by assistance  Patient completed Supine to Sit with minimum assistance    Functional Mobility/Transfers:  Patient completed Sit <> Stand Transfer with minimum assistance  with  rolling walker for 2 trials  Patient completed Bed <> Chair Transfer using Stand Pivot technique with minimum assistance with rolling walker  Functional Mobility: Pt engaging in functional mobility to simulate " household/community distances approx 3 lateral steps with MIN A and utilizing RW in order to maximize functional activity tolerance and standing balance required for engagement in occupations of choice.     Activities of Daily Living:  Upper Body Dressing: minimum assistance to don gown posteriorly seated EOB  Lower Body Dressing: minimum assistance to don shoes from home seated EOB    Cognitive/Visual Perceptual:  Cognitive/Psychosocial Skills:     -       Oriented to: Person, Place, Time, and Situation   -       Follows Commands/attention:Follows multistep  commands  -       Communication: clear/fluent  -       Memory: No Deficits noted  -       Safety awareness/insight to disability: intact   -       Mood/Affect/Coping skills/emotional control: Anxious and Pleasant    Physical Exam:  Skin integrity: Wound sacral area  Edema:  None noted  Sensation:    -       Impaired  light/touch L hand/digits  Upper Extremity Range of Motion:     -       Right Upper Extremity: WFL  -       Left Upper Extremity: WFL  Upper Extremity Strength:    -       Right Upper Extremity: Deficits: 4-/5 throughout  -       Left Upper Extremity: Deficits: 4-/5 throughout    Strength:    -       Right Upper Extremity: Deficits: 4/5  -       Left Upper Extremity: Deficits: 4/5  Fine Motor Coordination:    -       Impaired  Left hand, manipulation of objects   and Right hand, manipulation of objects    Gross motor coordination:   WFL    AMPAC 6 Click ADL:  AMPAC Total Score: 19    Treatment & Education:  Provided education regarding role of OT, POC, & discharge recommendations with pt and family verbalizing understanding.  Pt had no further questions & when asked whether there were any concerns pt reported none.   Pt educated on the benefits of completing OOB ADL/functional mobility tasks with hospital staff present, pt with good understanding.   Pt provided with energy conservation training (utilizing toilet seat for rest breaks, and grab  bars when completing t/f tasks) when performing ADL tasks in efforts to increase activity tolerance and independence in meaningful occupations.     Patient left up in chair with all lines intact, call button in reach, and family present    GOALS:   Multidisciplinary Problems       Occupational Therapy Goals          Problem: Occupational Therapy    Goal Priority Disciplines Outcome Interventions   Occupational Therapy Goal     OT, PT/OT Progressing    Description: Goals to be met by: 4/12/25     Patient will increase functional independence with ADLs by performing:    UE Dressing with Set-up Assistance.  LE Dressing with Set-up Assistance.  Grooming while seated at sink with Set-up Assistance.  Toileting from bedside commode with Set-up Assistance for hygiene and clothing management.   Supine to sit with Modified [place].  Stand pivot transfers with Stand-by Assistance.  Toilet transfer to bedside commode with Stand-by Assistance.  Increased functional strength to WFL for BUE.                         DME Justifications:  No DME recommended requiring DME justifications    History:     Past Medical History:   Diagnosis Date    Age-related osteoporosis with current pathological fracture with routine healing 11/13/2024    Allergy     Altered mental status 06/19/2022    DYSARTHRIA, SPASTIC MOVEMENTS & DIFFICULTY SWALLOWING    Anemia     Anxiety     Arthritis     C. difficile colitis 09/29/2024    Currently treated with po vancomycin      Cataract     both removed    Colon polyps     Coronary artery disease     Depression     Diabetes mellitus, type II     Disorder of kidney and ureter     Epilepsia partialis continua 04/28/2023    Fibromyalgia     Follicular lymphoma     GERD (gastroesophageal reflux disease)     HTN (hypertension)     Hyperlipidemia     MI (myocardial infarction) 03/2019    Mild non proliferative diabetic retinopathy 08/13/2024    Palliative care encounter 10/22/2021    Personal history of colonic  polyps     Restless leg syndrome     Stroke          Past Surgical History:   Procedure Laterality Date    A-V CARDIAC PACEMAKER INSERTION N/A 2/17/2025    Procedure: INSERTION, CARDIAC PACEMAKER, DUAL CHAMBER;  Surgeon: Karl Rico MD;  Location: [facility] CATH LAB;  Service: Cardiology;  Laterality: N/A;    APPLICATION OF WOUND VACUUM-ASSISTED CLOSURE DEVICE Right 9/25/2024    Procedure: APPLICATION, WOUND VAC;  Surgeon: Neto Davalos MD;  [facility];  Service: Orthopedics;  Laterality: Right;    COLONOSCOPY  11/07/2012    Colon polyp found; repeat in 5 years    COLONOSCOPY N/A 11/4/2024    Procedure: COLONOSCOPY;  Surgeon: David Castaneda MD;  Location: [facility] (2ND FLR);  Service: Endoscopy;  Laterality: N/A;    DEBRIDEMENT OF LOCAL FLAP Right 9/25/2024    Procedure: DEBRIDEMENT, LOCAL FLAP;  Surgeon: Neto Davalos MD;  [address]  Service: Orthopedics;  Laterality: Right;    ELBOW SURGERY Right 2015    dislocation repair     ESOPHAGOGASTRODUODENOSCOPY  11/07/2012    atrophic gastritis, H pylori testing negative    INCISION AND DRAINAGE FOOT Right 6/2/2021    Procedure: INCISION AND DRAINAGE, FOOT, bone biopsy;  Surgeon: Quiana Penn DPM;  Location: [facility] OR;  Service: Podiatry;  Laterality: Right;    IRRIGATION AND DEBRIDEMENT OF LOWER EXTREMITY Right 9/25/2024    Procedure: IRRIGATION AND DEBRIDEMENT, LOWER EXTREMITY; slider table, supine, bone foam, cysto tubing, 6L NS/dakins/peroxide, culture swabs;  Surgeon: Neto Davalos MD;  [facility];  Service: Orthopedics;  Laterality: Right;    KNEE SURGERY Bilateral 2015    scoped    LEFT HEART CATHETERIZATION Left 3/29/2019    Procedure: Left heart cath;  Surgeon: Bladimir Barbosa MD;  Location: [facility] CATH LAB;  Service: Cardiology;  Laterality: Left;    LEFT HEART CATHETERIZATION Left 11/18/2019    Procedure: Left heart cath;  Surgeon: Karl Rico MD;  Location: [facility] CATH  LAB;  Service: Cardiology;  Laterality: Left;    LEFT HEART CATHETERIZATION Left 1/8/2020    Procedure: Left heart cath, right radial, noon start;  Surgeon: Christos Monreal MD;  Location: [facility] CATH LAB;  Service: Cardiology;  Laterality: Left;  RN Pre Op 1-6-20.  To be admitted 1-7-20 sor Aspirin Disensitation    LEFT HEART CATHETERIZATION Left 2/19/2025    Procedure: Left heart cath;  Surgeon: Karl Rico MD;  Location: [facility] CATH LAB;  Service: Cardiology;  Laterality: Left;    OPEN REDUCTION AND INTERNAL FIXATION (ORIF) OF FRACTURE OF ACETABULUM Right 8/16/2024    Procedure: ORIF, FRACTURE, ACETABULUM;  Surgeon: Neto Davalos MD;  Location: [facility] OR [address] [place] [address];  Service: Orthopedics;  Laterality: Right;  anterior and lateral pelvic incisions    OPEN REDUCTION AND INTERNAL FIXATION (ORIF) OF PILON FRACTURE Right 8/14/2024    Procedure: ORIF, FRACTURE, PILON;  Surgeon: Neto Davalos MD;  Location: [facility] OR [address] [place] [address];  Service: Orthopedics;  Laterality: Right;    TONSILLECTOMY  1955    ULTRASOUND GUIDANCE  1/8/2020    Procedure: Ultrasound Guidance;  Surgeon: Christos Monreal MD;  Location: [facility] CATH LAB;  Service: Cardiology;;       Time Tracking:     OT Date of Treatment: 03/12/25  OT Start Time: 1205  OT Stop Time: 1239  OT Total Time (min): 34 min    Billable Minutes:Evaluation 11  Self Care/Home Management 23    3/12/2025

## 2025-03-12 NOTE — PROGRESS NOTES
[facility]  Nephrology  Progress Note    Patient Name: Lroena Contreras  MRN: [id number]  Admission Date: 3/2/2025  Hospital Length of Stay: 9 days  Attending Provider: Jairon Morales MD   Primary Care Physician: Jorge Paris MD  Principal Problem:Hypervolemia associated with renal insufficiency    Subjective:     HPI: Lorena is a pleasant 75 yo woman w pmhx significant for DM2, fibromyalgia, lymphoma s/p chemo, HTN HLD CVA MI CAD PCI HFpEF anemia, KYA on CKD3b now dialysis dependent 2/22/25 after recent hospitalization at [facility] cardiogenic shock from complete heart block w heart cath on 2/19. Last HD session 3/1/25. She was admitted overnight for SOB and chest tightness since discharge and leg pain attributed to swelling. Cr stable overnight from 2.1 to 1.9. No IO recorded. Anuric since starting HD. CXR showed pulmonary edema and bilateral pleural effusions. She was given 100 mg IV lasix in ED.     Interval History; Patient seen this morning no acute distress. Planning for HD today.      Objective:     Vital Signs (Most Recent):  Temp: 98.3 °F (36.8 °C) (03/12/25 1140)  Pulse: 110 (03/12/25 1140)  Resp: 17 (03/12/25 1016)  BP: (!) 108/57 (03/12/25 1140)  SpO2: 96 % (03/12/25 1140) Vital Signs (24h Range):  Temp:  [97.8 °F (36.6 °C)-98.8 °F (37.1 °C)] 98.3 °F (36.8 °C)  Pulse:  [] 110  Resp:  [17-20] 17  SpO2:  [91 %-96 %] 96 %  BP: (100-133)/(52-63) 108/57     Weight: 85.7 kg (189 lb) (03/03/25 0700)  Body mass index is 27.91 kg/m².  Body surface area is 2.04 meters squared.    No intake/output data recorded.     Physical Exam  Cardiovascular:      Rate and Rhythm: Regular rhythm.      Heart sounds: Normal heart sounds.   Musculoskeletal:         General: Swelling present.   Neurological:      Mental Status: She is alert. Mental status is at baseline.   Psychiatric:         Mood and Affect: Mood normal.          Significant Labs:  CBC:   Recent Labs   Lab 03/10/25  1519   WBC  10.22   RBC 2.79*   HGB 7.9*   HCT 25.6*      MCV 92   MCH 28.3   MCHC 30.9*     CMP:   Recent Labs   Lab 03/10/25  1519   *   CALCIUM 8.2*   ALBUMIN 2.2*   PROT 5.8*   *   K 3.5   CO2 25      BUN 21   CREATININE 2.3*   ALKPHOS 123   ALT 11   AST 23   BILITOT 0.4     All labs within the past 24 hours have been reviewed.  Assessment/Plan:     Cardiac/Vascular  Acute on chronic diastolic heart failure  Management per primary team.      Renal/  Acute kidney injury superimposed on stage 3b chronic kidney disease  KYA on CKD 3b 2/2 hemodynamic instability from complete heart block with cath on 2/19.     Continue HD MWF  EPO with dialysis.     Oncology  Anemia  EPO with HD           Thank you for your consult. I will follow-up with patient. Please contact us if you have any additional questions.    Merlin Eaton MD  Nephrology  [facility]

## 2025-03-12 NOTE — ASSESSMENT & PLAN NOTE
KYA on CKD 3b 2/2 hemodynamic instability from complete heart block with cath on 2/19.     Continue HD MWF  EPO with dialysis.

## 2025-03-12 NOTE — PLAN OF CARE
David miki - [facility]      HOME HEALTH ORDERS  FACE TO FACE ENCOUNTER    Patient Name: Lorena Contreras  YOB: 1950    PCP: Jorge Paris MD   PCP Address: [id number]  PCP Phone Number: [phone]  PCP Fax: [phone]    Encounter Date: 3/2/25    Admit to Home Health    Diagnoses:  Active Hospital Problems    Diagnosis  POA    Physical debility [R53.81]  Yes    ESRD (end stage renal disease) [N18.6]  Yes    GERD (gastroesophageal reflux disease) [K21.9]  Yes     Chronic    Cardiac pacemaker in situ [Z95.0]  Yes     Chronic     Medtronic dual pacemaker 2/17/25 for CHB      Acute kidney injury superimposed on stage 3b chronic kidney disease [N17.9, N18.32]  Yes    CHB (complete heart block) [I44.2]  Yes     Chronic    Anemia [D64.9]  Yes     Chronic    Constipation [K59.00]  Yes     Chronic    Aortic stenosis [I35.0]  Yes    Type 2 diabetes mellitus with stage 3b chronic kidney disease, with long-term current use of insulin [E11.22, N18.32, Z79.4]  Not Applicable     Chronic    Acute on chronic diastolic heart failure [I50.33]  Yes     Noted on echo 11/18/2019:  Grade II (moderate) left ventricular diastolic dysfunction consistent with pseudonormalization         Peripheral vascular disease in diabetes mellitus -- CLEMENT 05/2019 [E11.51]  Yes     Chronic     Noted on CLEMENT 05/06/2019:  Hemodynamically significant greater than 70% lesion in the L SFA proximally  Moderate greater than 50% plaque noted in the right SFA  Noncompressible CLEMENT bilaterally         Pulmonary hypertension -- echo 11/2019 [I27.20]  Yes     Chronic     Noted on echo 11/18/2019:  The estimated PA systolic pressure is 50 mm Hg.  Pulmonary hypertension present.  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 She was treated with chemotherapy (withour Rituximab due to allergy, unclear regimen) and apparently achieved a CR. She was then lost to follow up die to lack of insurance until 6/26/13 when she re-establish care in our clinic. Surveillance CTs from 7/9/13 are significant for unspecific some tissue prominence in the ventral tongue that is unspecific, unspecific pulmonary nodules and non bulky LAD in the internal jugular chain measuring up to 14 mm. These finding are unspecific and not indicative of follicular lymphoma recurrence.        Anxiety [F41.9]  Yes     Chronic    Primary hypertension [I10]  Yes     Chronic      Resolved Hospital Problems    Diagnosis Date Resolved POA    *Hypervolemia associated with renal insufficiency [E87.70, N28.9] 03/12/2025 Yes    Shortness of breath [R06.02] 03/11/2025 Yes    Goals of care, counseling/discussion [Z71.89] 03/12/2025 Not Applicable    Advanced care planning/counseling discussion [Z71.89] 03/12/2025 Not Applicable    Palliative care encounter [Z51.5] 03/12/2025 Not Applicable       Follow Up Appointments:  Future Appointments   Date Time Provider Department Center   3/13/2025  9:45 AM CHAIR 08, [facility]   3/15/2025  9:45 AM CHAIR 08, [place] [facility]   3/17/2025  9:30 AM Ann Deshpande PA-C [facility]   3/18/2025  9:45 AM CHAIR 08, [facility]   3/20/2025  9:45 AM CHAIR 08, [facility]   3/22/2025  9:45 AM CHAIR 08, [facility]   3/25/2025  9:45 AM CHAIR 08, [facility]   3/27/2025  9:45 AM CHAIR 08, [facility]   3/29/2025  9:45 AM CHAIR 08, [facility]   4/1/2025  9:45 AM CHAIR 08, [facility]  [facility]   4/3/2025  9:45 AM CHAIR 08, [facility] [place] [facility]   4/5/2025  9:45 AM CHAIR 05, [facility] [place] [facility]   4/8/2025  9:45 AM CHAIR 05, [facility] [place] [facility]   4/10/2025  9:45 AM CHAIR 05, [facility] [place] [facility]   4/12/2025  9:45 AM CHAIR 01, [facility] [place] [facility]   4/14/2025  9:00 AM Jorge Paris MD [facility]   4/15/2025  9:45 AM CHAIR 01, [facility] [place] [facility]   4/17/2025  9:45 AM CHAIR 01, [facility]   4/19/2025  9:45 AM CHAIR 01, [facility]   4/22/2025  9:45 AM CHAIR 01, [facility]   4/24/2025  9:45 AM CHAIR 01, [facility]   4/26/2025  9:45 AM CHAIR 01, [facility]   4/29/2025  9:45 AM CHAIR 01, [facility]   5/1/2025  9:00 AM [facility]   5/1/2025 12:30 PM CHAIR 08, [facility] [place] [facility]   5/3/2025  9:45 AM CHAIR 13, [facility] [place] [facility]   5/6/2025  9:45 AM CHAIR 08, [facility] [place] [facility]   5/8/2025  9:45 AM CHAIR 13, [facility]   5/9/2025 10:00 AM LAB, [facility]   5/10/2025  9:45 AM CHAIR 13, [facility]   5/13/2025  9:45 AM CHAIR 08, [facility]   5/15/2025  9:45 AM CHAIR 13, [facility]   5/16/2025  9:00 AM Gerardo Barbour MD [facility] ENDOCRN [facility]   5/17/2025  9:45 AM CHAIR 13, [facility]   5/20/2025  9:45 AM CHAIR 13, [facility]   5/22/2025  9:45 AM CHAIR 13, [facility]   5/24/2025  9:45 AM CHAIR  13, [facility]   5/27/2025  9:45 AM CHAIR 13, [facility] [place] [facility]   5/29/2025  9:45 AM CHAIR 13, [facility]   5/31/2025  9:45 AM CHAIR 10, [facility]   6/3/2025  9:45 AM CHAIR 05, [facility] [place] [facility]   6/5/2025  9:45 AM CHAIR 05, [facility]   6/7/2025  9:45 AM CHAIR 05, [facility]   6/10/2025  9:45 AM CHAIR 05, [facility]   6/12/2025  9:45 AM CHAIR 05, [facility]   6/14/2025  9:45 AM CHAIR 05, [facility]   6/17/2025  9:45 AM CHAIR 05, [facility]   6/19/2025  9:45 AM CHAIR 05, [facility]   6/21/2025  9:45 AM CHAIR 05, [facility]   6/24/2025  9:45 AM CHAIR 05, [facility]   6/26/2025  9:45 AM CHAIR 05, [facility]   6/28/2025  9:45 AM CHAIR 05, [facility]       Allergies:  Review of patient's allergies indicates:   Allergen Reactions    Novolin 70/30 (semi-synthetic) Nausea And Vomiting     Severe vomiting on Relion 70/30    Sulfa (sulfonamide antibiotics) Anaphylaxis    Talwin [pentazocine lactate] Anaphylaxis    Victoza [liraglutide] Nausea And Vomiting    Glipizide Nausea Only    Codeine     Influenza virus vaccines Hives    Iodine and iodide containing products Hives    Levetiracetam Itching    Lyrica [pregabalin] Hallucinations    Neurontin [gabapentin]      Possible associated myoclonic jerk    Rituxan [rituximab] Hives    Zoloft [sertraline] Nausea And Vomiting       Medications: Review discharge medications with patient and family and provide education.    Current Medications[1]     Medication  List        CHANGE how you take these medications      hydrALAZINE 50 MG tablet  Commonly known as: APRESOLINE  Take 1 tablet (50 mg total) by mouth every 8 (eight) hours.  What changed:   medication strength  how much to take     meclizine 12.5 mg tablet  Commonly known as: ANTIVERT  Take 1 tablet (12.5 mg total) by mouth 2 (two) times daily as needed for Dizziness.  What changed: when to take this     oxyCODONE 10 mg Tab immediate release tablet  Commonly known as: ROXICODONE  Take 1 tablet (10 mg total) by mouth every 6 (six) hours as needed for Pain.  What changed: when to take this            CONTINUE taking these medications      albuterol 90 mcg/actuation inhaler  Commonly known as: PROVENTIL/VENTOLIN HFA  Inhale 2 puffs into the lungs every 6 (six) hours as needed for Wheezing or Shortness of Breath.     aluminum & magnesium hydroxide-simethicone 400-400-40 mg/5 mL suspension  Commonly known as: MYLANTA MAX STRENGTH  Take 30 mLs by mouth every 6 (six) hours as needed for Indigestion.     aspirin 81 MG Chew  Take 1 tablet (81 mg total) by mouth once daily. Hold to avoid bleed risk on triple therapy and then resume on oct 27 once eliquis stops on oct 26th     atorvastatin 80 MG tablet  Commonly known as: LIPITOR  Take 1 tablet (80 mg total) by mouth every evening.     DEXCOM G7  Misc  Generic drug: blood-glucose meter,continuous  Use 1  to track blood glucose, ICD10: E11.65     DEXCOM G7 SENSOR Samina  Generic drug: blood-glucose sensor  Use 1 sensor every 10 days to track blood glucose, ICD10: E11.65, okay with 90 day supply if possible     DULCOLAX (BISACODYL) ORAL  Take 1 tablet by mouth daily as needed (constipation).     FLUoxetine 40 MG capsule  Take 1 capsule (40 mg total) by mouth once daily.     insulin aspart U-100 100 unit/mL (3 mL) Inpn pen  Commonly known as: NovoLOG  Inject 0-10 Units into the skin before meals and at bedtime as needed (Hyperglycemia).     insulin glargine U-100  "(Lantus) 100 unit/mL (3 mL) Inpn pen  Inject 8 Units into the skin every evening.     isosorbide mononitrate 60 MG 24 hr tablet  Commonly known as: IMDUR  Take 1 tablet (60 mg total) by mouth once daily.     LORazepam 1 MG tablet  Commonly known as: ATIVAN  TAKE 1 TABLET BY MOUTH ONCE DAILY AS NEEDED FOR ANXIETY     MELATONIN ORAL  Take 1 tablet by mouth nightly as needed (insomnia).     multivitamin per tablet  Commonly known as: THERAGRAN  Take 1 tablet by mouth once daily.     ondansetron 8 MG Tbdl  Commonly known as: ZOFRAN-ODT  Dissolve 1 tablet (8 mg total) by mouth every 8 (eight) hours as needed (nausea).     pantoprazole 40 MG tablet  Commonly known as: PROTONIX  Take 1 tablet (40 mg total) by mouth once daily.     pen needle, diabetic 32 gauge x 5/32" Ndle  Commonly known as: BD ULTRA-FINE SAGAR PEN NEEDLE  One pen needle use with insulin pen 4 times a day.  ICD-10: E11.9     QUEtiapine 50 MG tablet  Commonly known as: SEROQUEL  Take 1 tablet (50 mg total) by mouth nightly as needed (insomnia).     ticagrelor 60 mg tablet  Commonly known as: BRILINTA  Take 1 tablet (60 mg total) by mouth 2 (two) times daily.     triamcinolone acetonide 0.1% 0.1 % cream  Commonly known as: KENALOG  Apply topically 2 (two) times daily.                I have seen and examined this patient within the last 30 days. My clinical findings that support the need for the home health skilled services and home bound status are the following:no   Weakness/numbness causing balance and gait disturbance due to Weakness/Debility making it taxing to leave home.     Diet:   renal diet and fluid restriction 2 liters    Labs:  none    Referrals/ Consults  Physical Therapy to evaluate and treat. Evaluate for home safety and equipment needs; Establish/upgrade home exercise program. Perform / instruct on therapeutic exercises, gait training, transfer training, and Range of Motion.  Occupational Therapy to evaluate and treat. Evaluate home " environment for safety and equipment needs. Perform/Instruct on transfers, ADL training, ROM, and therapeutic exercises.  Aide to provide assistance with personal care, ADLs, and vital signs.    Activities:   activity as tolerated    Nursing:   Agency to admit patient within 24 hours of hospital discharge unless specified on physician order or at patient request    SN to complete comprehensive assessment including routine vital signs. Instruct on disease process and s/s of complications to report to MD. Review/verify medication list sent home with the patient at time of discharge  and instruct patient/caregiver as needed. Frequency may be adjusted depending on start of care date.     Skilled nurse to perform up to 3 visits PRN for symptoms related to diagnosis    Notify MD if SBP > 160 or < 90; DBP > 90 or < 50; HR > 120 or < 50; Temp > 101; O2 < 88%    Ok to schedule additional visits based on staff availability and patient request on consecutive days within the home health episode.    When multiple disciplines ordered:    Start of Care occurs on Sunday - Wednesday schedule remaining discipline evaluations as ordered on separate consecutive days following the start of care.    Thursday SOC -schedule subsequent evaluations Friday and Monday the following week.     Friday - Saturday SOC - schedule subsequent discipline evaluations on consecutive days starting Monday of the following week.    For all post-discharge communication and subsequent orders please contact patient's primary care physician. If unable to reach primary care physician or do not receive response within 30 minutes, please contact Jorge Paris MD for clinical staff order clarification    Miscellaneous   none    Home Health Aide:  Nursing Weekly, Physical Therapy Three times weekly, Occupational Therapy Three times weekly, and Home Health Aide Three times weekly    Wound Care Orders  Left breast intertrigo: Apply interdry cloth to skin folds q  5 days or prn dislodgement or soiling.                   - Before applying Interdry, make sure inner skin folds are free of any cream or ointment.   - Place one side of interdry sheet to  inner skin fold and leave 2 inches of cloth past skin fold for moisture to escape.   - Make sure interdry is not bunched together or overlaping in the inner skin fold.    - Make sure exposed cloth (past skin fold) is not on moist or weeping skin. .  Sacrum, buttocks, perirectal, inner thighs: bedside nursing to cleanse with bath wipes, pat dry and apply triad bid/prn; no dressing    I certify that this patient is confined to her home and needs intermittent skilled nursing care, physical therapy, and occupational therapy.      Jairon Morales MD  [facility]           [1]   Current Facility-Administered Medications   Medication Dose Route Frequency Provider Last Rate Last Admin    0.9% NaCl infusion   Intravenous PRN Juaquin Capone MD   Stopped at 03/08/25 1857    acetaminophen tablet 650 mg  650 mg Oral Q6H PRN Janice Tucker, NP   650 mg at 03/06/25 1440    albuterol inhaler 2 puff  2 puff Inhalation Q6H PRN Janice Tucker, NP   2 puff at 03/04/25 1529    aspirin chewable tablet 81 mg  81 mg Oral Daily Janice Tucker, NP   81 mg at 03/12/25 0832    atorvastatin tablet 80 mg  80 mg Oral QHS Janice Tucker, NP   80 mg at 03/11/25 2035    bisacodyL suppository 10 mg  10 mg Rectal Daily PRN Kari Smith MD   10 mg at 03/03/25 1617    dextrose 50% injection 12.5 g  12.5 g Intravenous PRN Janice Tucker, NP        dextrose 50% injection 25 g  25 g Intravenous PRN Janice Tucker, NP        FLUoxetine capsule 40 mg  40 mg Oral Daily Janice Tucker, NP   40 mg at 03/12/25 0832    glucagon (human recombinant) injection 1 mg  1 mg Intramuscular PRN Janice Tucker, NP        glucose chewable tablet 16 g  16 g Oral PRN Janice Tucker NP        glucose chewable tablet 24 g   24 g Oral PRN Janice Tucker, NP        heparin (porcine) injection 1,000 Units  1,000 Units Intra-Catheter PRN Miguel Angel Alfredo MD   1,000 Units at 03/10/25 1844    heparin (porcine) injection 5,000 Units  5,000 Units Subcutaneous Q8H Janice Tucker, NP   5,000 Units at 03/12/25 0532    hydrALAZINE tablet 50 mg  50 mg Oral Q8H Janice Tucker, NP   50 mg at 03/12/25 0532    hydrOXYzine HCL tablet 25 mg  25 mg Oral TID PRN Janice Tucker, NP   25 mg at 03/12/25 1016    insulin aspart U-100 pen 0-5 Units  0-5 Units Subcutaneous QID (AC + HS) PRN Janice Tucker NP   3 Units at 03/11/25 1703    insulin glargine U-100 (Lantus) pen 5 Units  5 Units Subcutaneous QHS Janice Tucker NP   5 Units at 03/11/25 2041    isosorbide mononitrate 24 hr tablet 60 mg  60 mg Oral Daily Janice Tucker NP   60 mg at 03/11/25 0903    lactulose 20 gram/30 mL solution Soln 20 g  20 g Oral Q6H PRN Janice Tucker, NP        loperamide capsule 2 mg  2 mg Oral QID PRN Jairon Morales MD   2 mg at 03/12/25 1016    LORazepam tablet 1 mg  1 mg Oral Daily Kari Smith MD   1 mg at 03/12/25 0832    melatonin tablet 6 mg  6 mg Oral Nightly PRN Janice Tucker NP   6 mg at 03/07/25 2202    metoprolol succinate (TOPROL-XL) 24 hr tablet 25 mg  25 mg Oral Daily Janice Tucker NP   25 mg at 03/11/25 0903    multivitamin tablet  1 tablet Oral Daily Kari Smith MD   1 tablet at 03/12/25 0832    naloxone 0.4 mg/mL injection 0.02 mg  0.02 mg Intravenous PRN Janice Tucker NP        ondansetron injection 4 mg  4 mg Intravenous Q8H PRN Janice Tucker, NP        oxyCODONE 12 hr tablet 10 mg  10 mg Oral Q12H Jairon Morales MD   10 mg at 03/12/25 0832    oxyCODONE immediate release tablet Tab 10 mg  10 mg Oral Q6H PRN Jairon Morales MD   10 mg at 03/12/25 1016    pantoprazole EC tablet 40 mg  40 mg Oral Daily Janice Tucker NP   40 mg at 03/12/25 0832    QUEtiapine tablet 25 mg  25 mg Oral  Nightly PRN Janice Tucker NP   25 mg at 03/08/25 2054    senna-docusate 8.6-50 mg per tablet 1 tablet  1 tablet Oral Daily PRN Kari Smith MD        sodium chloride 0.9% flush 10 mL  10 mL Intravenous Q12H PRN Janice Tucker NP        ticagrelor tablet 60 mg  60 mg Oral BID Janice Tucker NP   60 mg at 03/12/25 0832

## 2025-03-12 NOTE — NURSING
Spoke with patient and patient daughter about the need for oxygen vs anxiety. Patient daughter reported that mom called her stating that she was out of breath, having cp, dizzy and was taken off oxygen. Per nurse shift report yesterday when patient had these complaints nurse checked patient O2 and it was at 98% on RA. Nurse then placed nasal cannula in patient nose within out O2 being on and the patient made a sigh of relief saying that was much better. Daughter came today with concerns that she dont feel ok about mom going home in these conditions with her oxygen. I explained and educated to patient and daughter that mom mejia not need the oxygen. She was sat at 98% this morning and it was more of her anxiety. I gave her prn hydroxyzine, loperamide for diarrhea, and oxy for her pain she complained of on a scale of 9/10. I educated patient on how to take deep breaths in through nose and breathe out through mouth. I educated her on the importance of not being on oxygen if it is not needed due to other complications that it can cause. Patient verbalized understanding. I put on meditation sounds to her with calming techniques. Patient is now in room sleeping on RA. Oxygen is stable.

## 2025-03-12 NOTE — ASSESSMENT & PLAN NOTE
ESRD (end stage renal disease)   On hemodialysis since 2/22/2025. Anuric since starting but was able to urinate 300 mL with 300 mg of IV furosemide but none since. Nephrology is calling it ESRD now.    Baseline creatinine is unknown. Most recent creatinine and eGFR are listed below.  Recent Labs     03/10/25  1519   CREATININE 2.3*   EGFRNORACEVR 21.8*      Plan  - Dialysis per Nephrology.  - Appreciate Palliative Care for medication management of air hunger per Nephrology recommendation.

## 2025-03-12 NOTE — PT/OT/SLP EVAL
"Physical Therapy Co-Evaluation, Co-Treatment, and Discharge Note    Patient Name:  Lorena Contreras   MRN:  [id number]  Admitting Diagnosis:  Hypervolemia associated with renal insufficiency   Recent Surgery: * No surgery found *    Admit Date: 3/2/2025  Length of Stay: 9 days    Recommendations:     Discharge Recommendations:  Low Intensity Therapy   Discharge Equipment Recommendations: none   Justification for Equipment: N/A  Barriers to discharge: None    Assessment:     Lorena Contreras is a 74 y.o. female admitted with a medical diagnosis of Hypervolemia associated with renal insufficiency. .  At this time, patient is appropriate for discharge home with family and does not require further acute PT services.     Highest level of mobility achieved this visit: step transfer to bedside chair with RW and min A    Treatment Tolerated: Well    Plan:     During this hospitalization, patient does not require further acute PT services.  Please re-consult if situation changes.      Subjective     RN notified prior to session. Pt's sister and daughter present upon PT entrance into room.    Chief Complaint: anxiety  Patient/Family Comments/goals: "I'm just so scared to fall"  Pain/Comfort:  Pain Rating 1: 0/10    Social History:  Residence: lives with their family 1-story house with 4 NAMAN and GENIE HR.  Support available: family  Equipment Used: bedside commode, bath bench, rollator, wheelchair  Equipment Owned (not using): Walkers, Type: Rolling Walker  Prior level of function: assist required for mobility and ADLs  Work: Retired   Drive: no.     Objective:     Additional staff present: OT Marvin    Patient found HOB elevated with telemetry upon PT entry to room.    General Precautions: Standard, fall   Orthopedic Precautions:N/A   Braces: N/A   Body mass index is 27.91 kg/m².  Oxygen Device: Room Air  Vitals: BP (!) 108/57   Pulse 84   Temp 98.3 °F (36.8 °C) (Oral)   Resp 17   Ht 5' 9" (1.753 m)   Wt 85.7 kg (189 " lb)   SpO2 96%   Breastfeeding No   BMI 27.91 kg/m²     Exam:  Cognition:   Alert and Cooperative  Command following: Follows two-step verbal commands  Fluency: clear/fluent  Skin Integrity: Wound sacral  Edema: None noted  Sensation: Intact to light touch  Hearing: Intact  Vision:  Intact  Coordination: grossly intact  Range of Motion:  RLE: WFL  LLE: WFL  Strength Exam:  Bilateral Lower Extremity Strength: GENIE hips 4-/5, GENIE knees 5/5, R ankle DF 3-/5, PF 4/5, L ankle 4+/5    Outcome Measures:  AM-PAC 6 CLICK MOBILITY  Turning over in bed (including adjusting bedclothes, sheets and blankets)?: 3  Sitting down on and standing up from a chair with arms (e.g., wheelchair, bedside commode, etc.): 3  Moving from lying on back to sitting on the side of the bed?: 3  Moving to and from a bed to a chair (including a wheelchair)?: 3  Need to walk in hospital room?: 2  Climbing 3-5 steps with a railing?: 1  Basic Mobility Total Score: 15     Functional Mobility:    Bed Mobility:   Supine to Sit: contact guard assistance; from L side of bed  Scooting anteriorly to EOB to have both feet planted on floor: minimum assistance    Sitting Balance at Edge of Bed:  Assistance Level Required: Stand-by Assistance  Time: 15 min  Postural deviations noted: no deviations noted    Transfers:   Sit > Stand Transfer: minimum assistance with rolling walker   Stand > Sit Transfer: minimum assistance with rolling walker   x2 trials from EOB  Bed > Chair Transfer: 3-Step Transfer technique with minimum assistance with rolling walker  Chair on patient's R    Standing Balance:  Assistance Level Required: Contact Guard Assistance  Patient used: rolling walker   Time: 3 min + 2 min  Postural deviations noted: slouched posture  Encouraged: upright stance    Gait:   Patient ambulated: small steps to bedside chair   Patient required: minimal assist  Patient used:  rolling walker   Gait Pattern observed: swing-to gait  Gait Deviation(s): decreased  step length, decreased janak, decreased foot clearance, decreased weight shift, and flexed posture  Impairments due to: impaired balance, decreased strength, decreased endurance, and impaired postural control  all lines remained intact throughout ambulation trial  Gait belt utilized    Therapeutic Exercises:   Static and dynamic sitting balance at EOB  Postural correction, upright posture, and sitting tolerance  Static and dynamic standing balance at EOB  standing tolerance, upright stance, hip extension, and weight shifting    Education:  Time provided for education, counseling and discussion of health disposition in regards to patient's current status  All questions answered within PT scope of practice and to patient's satisfaction  PT role in POC to address current functional deficits  Pt educated on proper body mechanics, safety techniques, and energy conservation with PT facilitation and cueing throughout session  Call nursing/pct to transfer to chair/use bathroom. Pt stated understanding.    Patient left up in chair with all lines intact, call button in reach, and family present.    History:     Past Medical History:   Diagnosis Date    Age-related osteoporosis with current pathological fracture with routine healing 11/13/2024    Allergy     Altered mental status 06/19/2022    DYSARTHRIA, SPASTIC MOVEMENTS & DIFFICULTY SWALLOWING    Anemia     Anxiety     Arthritis     C. difficile colitis 09/29/2024    Currently treated with po vancomycin      Cataract     both removed    Colon polyps     Coronary artery disease     Depression     Diabetes mellitus, type II     Disorder of kidney and ureter     Epilepsia partialis continua 04/28/2023    Fibromyalgia     Follicular lymphoma     GERD (gastroesophageal reflux disease)     HTN (hypertension)     Hyperlipidemia     MI (myocardial infarction) 03/2019    Mild non proliferative diabetic retinopathy 08/13/2024    Palliative care encounter 10/22/2021    Personal  history of colonic polyps     Restless leg syndrome     Stroke        Past Surgical History:   Procedure Laterality Date    A-V CARDIAC PACEMAKER INSERTION N/A 2/17/2025    Procedure: INSERTION, CARDIAC PACEMAKER, DUAL CHAMBER;  Surgeon: Karl Rico MD;  Location: [facility] CATH LAB;  Service: Cardiology;  Laterality: N/A;    APPLICATION OF WOUND VACUUM-ASSISTED CLOSURE DEVICE Right 9/25/2024    Procedure: APPLICATION, WOUND VAC;  Surgeon: Neto Davalos MD;  [address];  Service: Orthopedics;  Laterality: Right;    COLONOSCOPY  11/07/2012    Colon polyp found; repeat in 5 years    COLONOSCOPY N/A 11/4/2024    Procedure: COLONOSCOPY;  Surgeon: David Castaneda MD;  Location: [facility] (2ND FLR);  Service: Endoscopy;  Laterality: N/A;    DEBRIDEMENT OF LOCAL FLAP Right 9/25/2024    Procedure: DEBRIDEMENT, LOCAL FLAP;  Surgeon: Neto Davalos MD;  [address];  Service: Orthopedics;  Laterality: Right;    ELBOW SURGERY Right 2015    dislocation repair     ESOPHAGOGASTRODUODENOSCOPY  11/07/2012    atrophic gastritis, H pylori testing negative    INCISION AND DRAINAGE FOOT Right 6/2/2021    Procedure: INCISION AND DRAINAGE, FOOT, bone biopsy;  Surgeon: Quiana Penn DPM;  Location: [facility] OR;  Service: Podiatry;  Laterality: Right;    IRRIGATION AND DEBRIDEMENT OF LOWER EXTREMITY Right 9/25/2024    Procedure: IRRIGATION AND DEBRIDEMENT, LOWER EXTREMITY; slider table, supine, bone foam, cysto tubing, 6L NS/dakins/peroxide, culture swabs;  Surgeon: Neto Davalos MD;  [facility];  Service: Orthopedics;  Laterality: Right;    KNEE SURGERY Bilateral 2015    scoped    LEFT HEART CATHETERIZATION Left 3/29/2019    Procedure: Left heart cath;  Surgeon: Bladimir Barbosa MD;  Location: [facility] CATH LAB;  Service: Cardiology;  Laterality: Left;    LEFT HEART CATHETERIZATION Left 11/18/2019    Procedure: Left heart cath;  Surgeon: Karl Rico MD;   Location: [facility] CATH LAB;  Service: Cardiology;  Laterality: Left;    LEFT HEART CATHETERIZATION Left 1/8/2020    Procedure: Left heart cath, right radial, noon start;  Surgeon: Christos Monreal MD;  Location: [facility] CATH LAB;  Service: Cardiology;  Laterality: Left;  RN Pre Op 1-6-20.  To be admitted 1-7-20 sor Aspirin Disensitation    LEFT HEART CATHETERIZATION Left 2/19/2025    Procedure: Left heart cath;  Surgeon: Karl Rico MD;  Location: [facility] CATH LAB;  Service: Cardiology;  Laterality: Left;    OPEN REDUCTION AND INTERNAL FIXATION (ORIF) OF FRACTURE OF ACETABULUM Right 8/16/2024    Procedure: ORIF, FRACTURE, ACETABULUM;  Surgeon: Neto Davalos MD;  Location: [address];  Service: Orthopedics;  Laterality: Right;  anterior and lateral pelvic incisions    OPEN REDUCTION AND INTERNAL FIXATION (ORIF) OF PILON FRACTURE Right 8/14/2024    Procedure: ORIF, FRACTURE, PILON;  Surgeon: Neto Davalos MD;  Location: [address];  Service: Orthopedics;  Laterality: Right;    TONSILLECTOMY  1955    ULTRASOUND GUIDANCE  1/8/2020    Procedure: Ultrasound Guidance;  Surgeon: Christos Monreal MD;  Location: [facility] CATH LAB;  Service: Cardiology;;       Time Tracking:     PT Received On: 03/12/25  PT Start Time: 1204     PT Stop Time: 1239  PT Total Time (min): 35 min     Billable Minutes: Evaluation 1 procedure, Therapeutic Activity 15 min, and Neuromuscular Re-education 10 min    Roberto Hsieh PT, DPT  3/12/2025

## 2025-03-12 NOTE — ASSESSMENT & PLAN NOTE
Patient's blood pressure range in the last 24 hours was: BP  Min: 100/63  Max: 133/52.The patient's inpatient anti-hypertensive regimen is listed below:  Current Antihypertensives  isosorbide mononitrate 24 hr tablet 60 mg, Daily, Oral  metoprolol succinate (TOPROL-XL) 24 hr tablet 25 mg, Daily, Oral  hydrALAZINE tablet 50 mg, Every 8 hours, Oral  hydrALAZINE (APRESOLINE) tablet, Every 8 hours, Oral    Plan  - BP is controlled, no changes needed to their regimen

## 2025-03-12 NOTE — DISCHARGE SUMMARY
[facility] Medicine  Discharge Summary      Patient Name: Lorena Contreras  MRN: [id number]  ANDREWS: [id number]  Patient Class: IP- Inpatient  Admission Date: 3/2/2025  Hospital Length of Stay: 9 days  Discharge Date and Time: 3/12/2025  5:43 PM  Attending Physician: Jairon Morales MD   Discharging Provider: Jairon Morales MD  Primary Care Provider: Jorge Paris MD  [facility] Medicine Team: [facility] Jairon Morales MD  Primary Care Team: [facility]    HPI:   Lorena Contreras is a 74 year old white woman with hypertension, hyperlipidemia, diabetes mellitus type 2, coronary artery disease, history of stroke, complete heart block status post dual chamber cardiac pacemaker placed 2/17/2025, pulmonary hypertension, chronic diastolic heart failure, peripheral vascular disease, chronic kidney disease stage 3b (on hemodialysis since 2/22/2025), anemia, follicular lymphoma diagnosed in 2009 treated with chemotherapy, gastroesophageal reflux disease, constipation, restless legs syndrome, fibromyalgia, anxiety, depression, history of right elbow dislocation repair in 2015, history of incision and drainage of right foot on 6/2/2021, history of open reduction and internal fixation of right pilon fracture on 8/14/2024 and right acetabulum fracture on 8/16/2024, history of right medial ankle fasciocutaneous flap and excisional debridement of left ankle medial wound on 9/25/2024, history of tonsillectomy in 1955. She uses a rolling walker and wheelchair to assist mobility. She lives in [place], [place]. She is . Her primary care physician is Dr. Jorge Paris.    She presented to [facility] Emergency Department on 3/2/2025 with worsening shortness of breath and chest tightness since being discharged from [facility] on 2/25/2025, where she was admitted on 2/15/2025 for complete heart block, acute kidney injury, and non-ST elevation myocardial  infarction. She had mild improvement of symptoms after hemodialysis lasting several hours. She had associated dyspnea on exertion, significant swelling of bilateral lower extremities and abdomen, orthopnea, wheezing, fatigue, and generalized weakness. Chest tightness was non-radiating, substernal, and worse with musculoskeletal manipulation and deep breathing, but unaffected by exertion and positioning. She had diffuse leg pain due to swelling, generalized abdominal discomfort described as dull, nagging pain, and nausea and vomiting. She has been anuric since starting dialysis. She had not missed any dialysis treatments.   In the emergency department, she had tachypnea and mild tachycardia. She was placed on 2 liters/minute of supplemental oxygen for comfort. Labs showed stable anemia and elevated WBC (94721/uL), bicarbonate 20 mmol/L, creatinine 2.1 mg/dL (baseline prior to starting dialysis was around 2.0), glucose 194 mg/dL, calcium 8.6 mg/dL, albumin 3.1 g/dL, BNP 1335 pg/mL, high sensitivity troponin 64 ng/L (repeat 57). EKG showed sinus rhythm with bigeminy, rate 70 bpm, no ST elevation or depression. Chest X-ray showed cardiomegaly with pulmonary edema and bilateral pleural effusions with aeration similar to mildly worse than 2/19/2025. Abdominal ultrasound showed cholelithiasis and gallbladder sludge without evidence of cholecystitis, distended gallbladder, hepatomegaly, bilateral pleural effusions. She was given acetaminophen and 100 mg of IV furosemide. She was admitted to Hospital Medicine Team S.    Hospital Course:   Nephrology was consulted for dialysis. She urinated 300 mL after furosemide. She had not urinated since started dialysis. She was put on IV furosemide 80 mg twice daily. She takes oxycodone 10 mg at home and requested something stronger. Oxycodone was increased to her home dose. On 3/5/2025, furosemide was changed to 80 mg by mouth, which was ineffective. It was changed to bumetanide 4 mg,  which was also ineffective. Nephrology recommended Palliative Care consult for medication management of air hunger. Palliative Care recommended oxycodone extended release 10 mg twice daily and oxycodone immediate release 10 mg every 6 hours as needed. On 3/7/2025 she reported numbness and tingling in her lower extremities that is chronic and intermittent (she has a prior diagnosis of peripheral neuropathy on her chart since 2015). Vitamin B12 level was high. Further evaluation of this can continue outpatient since she has had the problem for at least 10 years. By 3/9/2025, her net fluid status since admission was negative 10.878 liters. She reported shortness of breath while breathing through her mouth so was put on Venturi mask. Repeat chest X-ray on 3/10/2025 showed no change. On 3/11/2025, she requested Physical and Occupational Therapy evaluations. She reported that she was able to walk prior to her pacemaker placement, which was only 13 days prior to this admission, and has been debilitated since. She was weaned off nasal cannula on 3/11/2025. Home health was set up with physical therapy, occupational therapy, hospital bed and [facility] lift.      Goals of Care Treatment Preferences:  Code Status: Full Code          What is most important right now is to focus on remaining as independent as possible, symptom/pain control, extending life as long as possible, even it it means sacrificing quality, curative/life-prolongation (regardless of treatment burdens).  Accordingly, we have decided that the best plan to meet the patient's goals includes continuing with treatment.      SDOH Screening:  The patient was screened for utility difficulties, food insecurity, transport difficulties, housing insecurity, and interpersonal safety and there were no concerns identified this admission.     Consults:   Consults (From admission, onward)          Status Ordering Provider     Inpatient consult to Palliative Care  Once         Provider:  (Not yet assigned)    Completed KATE PEACOCK     Inpatient consult to [facility]  Practitioner  Once        Provider:  (Not yet assigned)    Acknowledged MADELAINE ANTHONY     Inpatient consult to Nephrology  Once        Provider:  (Not yet assigned)    Completed DEIDRA VIEIRA            Final Active Diagnoses:    Diagnosis Date Noted POA    Physical debility [R53.81] 03/11/2025 Yes    ESRD (end stage renal disease) [N18.6] 03/07/2025 Yes    GERD (gastroesophageal reflux disease) [K21.9] 03/03/2025 Yes     Chronic    Cardiac pacemaker in situ [Z95.0] 02/17/2025 Yes     Chronic    Acute kidney injury superimposed on stage 3b chronic kidney disease [N17.9, N18.32] 02/16/2025 Yes    CHB (complete heart block) [I44.2] 02/15/2025 Yes     Chronic    Anemia [D64.9] 10/30/2024 Yes     Chronic    Constipation [K59.00] 08/18/2024 Yes     Chronic    Aortic stenosis [I35.0] 11/06/2023 Yes    Type 2 diabetes mellitus with stage 3b chronic kidney disease, with long-term current use of insulin [E11.22, N18.32, Z79.4] 08/24/2023 Not Applicable     Chronic    Acute on chronic diastolic heart failure [I50.33] 08/10/2021 Yes    Peripheral vascular disease in diabetes mellitus -- CLEMENT 05/2019 [E11.51] 11/07/2020 Yes     Chronic    Pulmonary hypertension -- echo 11/2019 [I27.20] 02/06/2020 Yes     Chronic    Coronary artery disease of native artery of native heart with stable angina pectoris [I25.118] 03/29/2019 Yes     Chronic    Mixed hyperlipidemia [E78.2] 07/30/2015 Yes     Chronic    Peripheral neuropathy [G62.9] 07/30/2015 Yes     Chronic    Follicular lymphoma [C82.90] 08/26/2014 Yes     Chronic    Anxiety [F41.9] 01/24/2014 Yes     Chronic    Primary hypertension [I10] 01/24/2014 Yes     Chronic      Problems Resolved During this Admission:    Diagnosis Date Noted Date Resolved POA    PRINCIPAL PROBLEM:  Hypervolemia associated with renal insufficiency [E87.70, N28.9] 03/03/2025 03/12/2025 Yes     "Shortness of breath [R06.02] 03/07/2025 03/11/2025 Yes    Goals of care, counseling/discussion [Z71.89] 03/07/2025 03/12/2025 Not Applicable    Advanced care planning/counseling discussion [Z71.89] 03/07/2025 03/12/2025 Not Applicable    Palliative care encounter [Z51.5] 10/22/2021 03/12/2025 Not Applicable       Discharged Condition: good    Disposition: Home-Health Care [facility]    Follow Up:   Follow-up Information       Jorge Paris MD Follow up.    Specialties: Internal Medicine, Pediatrics  Contact information:  [address] [facility] [place] [id number] [phone]                           Patient Instructions:      PATIENT (BENOIT) LIFT FOR HOME USE     Order Specific Question Answer Comments   Height: 5' 9" (1.753 m)    Weight: 85.7 kg (189 lb)    Does patient have medical equipment at home? bath bench Dexcom / Dexcom / Dexcom / Dexcom / Dexcom / Dexcom / Dexcom   Does patient have medical equipment at home? bedside commode    Does patient have medical equipment at home? wheelchair    Does patient have medical equipment at home? rollator    Does patient have medical equipment at home? walker, rolling    Does patient have medical equipment at home? glucometer    Does patient have medical equipment at home? other (see comments)    Length of need (1-99 months): 99      HOSPITAL BED FOR HOME USE   Order Comments: evaluated hospital bed for repairs/ services needed and/ or replacement     Order Specific Question Answer Comments   Type: Semi-electric    Length of need (1-99 months): 99    Does patient have medical equipment at home? bath bench Dexcom / Dexcom / Dexcom / Dexcom / Dexcom / Dexcom / Dexcom   Does patient have medical equipment at home? bedside commode    Does patient have medical equipment at home? wheelchair    Does patient have medical equipment at home? rollator    Does patient have medical equipment at home? walker, rolling    Does patient have medical equipment at home? glucometer    Does patient " "have medical equipment at home? other (see comments)    Height: 5' 9" (1.753 m)    Weight: 85.7 kg (189 lb)    Please check all that apply: Patient requires positioning of the body in ways not feasible in an ordinary bed due to a medical condition which is expected to last at least one month.    Please check all that apply: Patient requires, for the alleviation of pain, positioning of the body in ways not feasible in an ordinary bed.      ACCEPT - Ambulatory referral/consult to [facility]   Standing Status: Future   Referral Priority: Routine Referral Type: Consultation   Referral Reason: Specialty Services Required   Requested Specialty: Cardiology   Number of Visits Requested: 1     Ambulatory referral/consult to Acute Care at Home   Standing Status: Future   Referral Priority: Routine Referral Type: Consultation   Referred to Provider: BRIANA PROVIDER Requested Specialty: Internal Medicine   Number of Visits Requested: 1     Diet Adult Regular     Order Specific Question Answer Comments   Fluid restriction: Fluid - 2000mL    Additional restrictions: Renal      Notify your health care provider if you experience any of the following:  difficulty breathing or increased cough     Activity as tolerated       Significant Diagnostic Studies:   US Abdomen Limited 3/2/25: FINDINGS:   Liver: Mildly enlarged measuring 18.4 cm. Homogeneous echotexture. Unchanged calcification in the left hepatic lobe measuring 0.7 x 3.1 cm, previously 0.6 x 2.7 cm.   Gallbladder: Several mobile calcified gallstones, the largest measuring 7 mm, and sludge.  No wall thickening, mural hyperemia, or pericholecystic fluid.  No sonographic Christian's sign.  Gallbladder is distended.   Biliary system: The common duct is not dilated for age, measuring 6 mm.  No intrahepatic ductal dilatation.   Spleen: Upper limit of normal in size with a homogeneous echotexture, measuring 12.2 x 5.0 cm.   Pancreas: The visualized " portions of pancreas appear normal.   Miscellaneous: No ascites.  Bilateral pleural effusion.  Limited evaluation of the right kidney is negative for hydronephrosis.   Impression:  1. Cholelithiasis and gallbladder sludge without evidence of cholecystitis.  Distended gallbladder.   2. Hepatomegaly.   3. Bilateral pleural effusion.   X-Ray Chest AP Portable 3/3/25: FINDINGS:   Cardiac wires overlie the chest.  Cardiomediastinal silhouette is enlarged and similar to the prior study.  Right-sided central venous catheter overlies the SVC.  Cardiac pacing device is again present.  Atherosclerotic calcifications overlie the aortic arch.  Central vascular congestion with bilateral interstitial opacities, likely pulmonary edema.  Small to moderate bilateral pleural effusions.  Passive atelectasis or airspace disease in the lung bases.  Upper lungs are relatively clear.  No distinct pneumothorax.   Impression:  Cardiomegaly with pulmonary edema and bilateral pleural effusions.  Aeration is similar to mildly worse when compared with 02/19/2025.   New right-sided central venous catheter overlying the SVC.   US Retroperitoneal Complete 3/3/25: FINDINGS:   Right kidney: The right kidney measures 10.7 cm. No cortical thinning. No loss of corticomedullary distinction.  Diminished perfusion.  Resistive index measures 1.0.  No mass. Few echogenic foci with posterior shadowing measuring up to 0.5 cm.  No hydronephrosis.   Left kidney: The left kidney measures 11.0 cm. No cortical thinning. No loss of corticomedullary distinction.  Diminished perfusion.  Resistive index measures 1.0.  No mass.  Few echogenic foci with posterior shadowing measuring up to 0.3 cm.   No hydronephrosis.   Splenic resistive index measures 0.68.   The bladder is partially distended at the time of scanning and has an unremarkable appearance.   Left pleural effusion.   Impression:  Signs of medical renal disease bilaterally.  No hydronephrosis.   Suspected  small nonobstructing renal stones bilaterally.   Left pleural effusion.   Transthoracic echo complete 3/3/25:     Left Ventricle: The left ventricle is normal in size. Ventricular mass is normal. Normal wall thickness. There is concentric remodeling. There is normal systolic function with a visually estimated ejection fraction of 55%. Quantitated ejection fraction is 50%. There is diastolic dysfunction. Elevated left ventricular filling pressure.    Left Ventricle: Regional wall motion abnormalities present.    Right Ventricle: The right ventricle is normal in size. Systolic function is borderline low.    Left Atrium: severely dilated    Aortic Valve: There is moderate aortic valve sclerosis. Severely restricted motion. There is severe stenosis. Aortic valve area by VTI is 0.7 cm2. Aortic valve peak velocity is 4.2 m/s. Mean gradient is 40 mmHg. The dimensionless index is 0.23.    Mitral Valve: There is mild anterior leaflet sclerosis. There is severe posterior mitral annular calcification present. There is mild to moderate stenosis. The mean pressure gradient across the mitral valve is 6 mmHg at a heart rate of 89 bpm. There is mild to moderate regurgitation. MVA 1.9cm2 by planimetry    Tricuspid Valve: There is moderate regurgitation.    Pulmonary Artery: The estimated pulmonary artery systolic pressure is 73 mmHg.    IVC/SVC: Elevated venous pressure at 15 mmHg.    Pericardium: There is a moderate circumferential effusion. Bilateral pleural effusions.    <30% respiratory variation across mitral valve, no diastolic RV collapse, however effusion moderate and new, would continue to monitor closely.  X-Ray Chest AP Portable 3/10/25: FINDINGS:   Stable position of large-bore right jugular catheter, tip superimposing cavoatrial soft tissues.  Stable enlargement of cardiopericardial silhouette, arch calcification, and left-sided dual lead pacing device.  Stable pulmonary vascular congestion, interstitial edema, and left  greater than right lower lung zone areas of at atelectasis and or infiltrate and or edema and bilateral pleural effusions.  No right or left pneumothoraces.  EKG leads superimposed chest and abdomen.   Impression:  No significant interval changes.   Medications:  Reconciled Home Medications:      Medication List        CHANGE how you take these medications      hydrALAZINE 50 MG tablet  Commonly known as: APRESOLINE  Take 1 tablet (50 mg total) by mouth every 8 (eight) hours.  What changed:   medication strength  how much to take     meclizine 12.5 mg tablet  Commonly known as: ANTIVERT  Take 1 tablet (12.5 mg total) by mouth 2 (two) times daily as needed for Dizziness.  What changed: when to take this     oxyCODONE 10 mg Tab immediate release tablet  Commonly known as: ROXICODONE  Take 1 tablet (10 mg total) by mouth every 6 (six) hours as needed for Pain.  What changed: when to take this            CONTINUE taking these medications      albuterol 90 mcg/actuation inhaler  Commonly known as: PROVENTIL/VENTOLIN HFA  Inhale 2 puffs into the lungs every 6 (six) hours as needed for Wheezing or Shortness of Breath.     aluminum & magnesium hydroxide-simethicone 400-400-40 mg/5 mL suspension  Commonly known as: MYLANTA MAX STRENGTH  Take 30 mLs by mouth every 6 (six) hours as needed for Indigestion.     aspirin 81 MG Chew  Take 1 tablet (81 mg total) by mouth once daily. Hold to avoid bleed risk on triple therapy and then resume on oct 27 once eliquis stops on oct 26th     atorvastatin 80 MG tablet  Commonly known as: LIPITOR  Take 1 tablet (80 mg total) by mouth every evening.     DEXCOM G7  Misc  Generic drug: blood-glucose meter,continuous  Use 1  to track blood glucose, ICD10: E11.65     DEXCOM G7 SENSOR Samina  Generic drug: blood-glucose sensor  Use 1 sensor every 10 days to track blood glucose, ICD10: E11.65, okay with 90 day supply if possible     DULCOLAX (BISACODYL) ORAL  Take 1 tablet by mouth  "daily as needed (constipation).     FLUoxetine 40 MG capsule  Take 1 capsule (40 mg total) by mouth once daily.     insulin aspart U-100 100 unit/mL (3 mL) Inpn pen  Commonly known as: NovoLOG  Inject 0-10 Units into the skin before meals and at bedtime as needed (Hyperglycemia).     insulin glargine U-100 (Lantus) 100 unit/mL (3 mL) Inpn pen  Inject 8 Units into the skin every evening.     isosorbide mononitrate 60 MG 24 hr tablet  Commonly known as: IMDUR  Take 1 tablet (60 mg total) by mouth once daily.     LORazepam 1 MG tablet  Commonly known as: ATIVAN  TAKE 1 TABLET BY MOUTH ONCE DAILY AS NEEDED FOR ANXIETY     MELATONIN ORAL  Take 1 tablet by mouth nightly as needed (insomnia).     multivitamin per tablet  Commonly known as: THERAGRAN  Take 1 tablet by mouth once daily.     ondansetron 8 MG Tbdl  Commonly known as: ZOFRAN-ODT  Dissolve 1 tablet (8 mg total) by mouth every 8 (eight) hours as needed (nausea).     pantoprazole 40 MG tablet  Commonly known as: PROTONIX  Take 1 tablet (40 mg total) by mouth once daily.     pen needle, diabetic 32 gauge x 5/32" Ndle  Commonly known as: BD ULTRA-FINE SAGAR PEN NEEDLE  One pen needle use with insulin pen 4 times a day.  ICD-10: E11.9     QUEtiapine 50 MG tablet  Commonly known as: SEROQUEL  Take 1 tablet (50 mg total) by mouth nightly as needed (insomnia).     ticagrelor 60 mg tablet  Commonly known as: BRILINTA  Take 1 tablet (60 mg total) by mouth 2 (two) times daily.     triamcinolone acetonide 0.1% 0.1 % cream  Commonly known as: KENALOG  Apply topically 2 (two) times daily.              Indwelling Lines/Drains at time of discharge:   Lines/Drains/Airways       Central Venous Catheter Line  Duration                  Hemodialysis Catheter 02/21/25 1425 right internal jugular 18 days                 Time spent on the discharge of patient: 35 minutes         Jairon Morales MD  Department of Hospital Medicine  [facility]  "

## 2025-03-12 NOTE — SUBJECTIVE & OBJECTIVE
Interval History; Patient seen this morning no acute distress. Planning for HD today.      Objective:     Vital Signs (Most Recent):  Temp: 98.3 °F (36.8 °C) (03/12/25 1140)  Pulse: 110 (03/12/25 1140)  Resp: 17 (03/12/25 1016)  BP: (!) 108/57 (03/12/25 1140)  SpO2: 96 % (03/12/25 1140) Vital Signs (24h Range):  Temp:  [97.8 °F (36.6 °C)-98.8 °F (37.1 °C)] 98.3 °F (36.8 °C)  Pulse:  [] 110  Resp:  [17-20] 17  SpO2:  [91 %-96 %] 96 %  BP: (100-133)/(52-63) 108/57     Weight: 85.7 kg (189 lb) (03/03/25 0700)  Body mass index is 27.91 kg/m².  Body surface area is 2.04 meters squared.    No intake/output data recorded.     Physical Exam  Cardiovascular:      Rate and Rhythm: Regular rhythm.      Heart sounds: Normal heart sounds.   Musculoskeletal:         General: Swelling present.   Neurological:      Mental Status: She is alert. Mental status is at baseline.   Psychiatric:         Mood and Affect: Mood normal.          Significant Labs:  CBC:   Recent Labs   Lab 03/10/25  1519   WBC 10.22   RBC 2.79*   HGB 7.9*   HCT 25.6*      MCV 92   MCH 28.3   MCHC 30.9*     CMP:   Recent Labs   Lab 03/10/25  1519   *   CALCIUM 8.2*   ALBUMIN 2.2*   PROT 5.8*   *   K 3.5   CO2 25      BUN 21   CREATININE 2.3*   ALKPHOS 123   ALT 11   AST 23   BILITOT 0.4     All labs within the past 24 hours have been reviewed.

## 2025-03-12 NOTE — DISCHARGE INSTRUCTIONS
Our goal at [facility] is to always give you outstanding care and exceptional service. You may receive a survey from [facility] by mail, text or e-mail in the next 24-48 hours asking about the care you received with us. The survey should only take 5-10 minutes to complete and is very important to us.     Your feedback provides us with a way to recognize our staff who work tirelessly to provide the best care! Also, your responses help us learn how to improve when your experience was below our aspiration of excellence. We are always looking for ways to improve your stay. We WILL use your feedback to continue making improvements to help us provide the highest quality care. We keep your personal information and feedback confidential. We appreciate your time completing this survey and can't wait to hear from you!!!    We look forward to your continued care with us! Thanks so much for choosing [facility] for your healthcare needs!

## 2025-03-12 NOTE — ASSESSMENT & PLAN NOTE
Volume removal with dialysis. Oral furosemide and bumetanide were not effective. Weaned off supplemental oxygen so medically ready for discharge. Monitor intake/output, electrolytes.

## 2025-03-13 NOTE — PLAN OF CARE
APPOINTMENT:    Patient Appointment(s) scheduled with Ann Deshpande PA-C Monday Mar 17, 2025 9:30 AM

## 2025-03-14 ENCOUNTER — PATIENT OUTREACH (OUTPATIENT)
Facility: OTHER | Age: 75
End: 2025-03-14
Payer: MEDICARE

## 2025-03-14 NOTE — PROGRESS NOTES
Patient discharged from [facility] on 3/12/25. Discharge summary instructs pt to follow up with primary care. Appointment scheduled for 3/17/2025 at 9:30 AM with Ann Deshpande PA-C at [address] [place] [address]. Called and spoke to patient's daughter, appointment confirmed. Follow up call scheduled to assess for additional needs on 3/18/25

## 2025-03-17 ENCOUNTER — OFFICE VISIT (OUTPATIENT)
Dept: INTERNAL MEDICINE | Facility: CLINIC | Age: 75
End: 2025-03-17
Payer: MEDICARE

## 2025-03-17 ENCOUNTER — TELEPHONE (OUTPATIENT)
Dept: INFUSION THERAPY | Facility: HOSPITAL | Age: 75
End: 2025-03-17
Payer: MEDICARE

## 2025-03-17 VITALS
DIASTOLIC BLOOD PRESSURE: 60 MMHG | BODY MASS INDEX: 27.98 KG/M2 | HEIGHT: 69 IN | OXYGEN SATURATION: 96 % | HEART RATE: 63 BPM | SYSTOLIC BLOOD PRESSURE: 146 MMHG | WEIGHT: 188.94 LBS

## 2025-03-17 DIAGNOSIS — R06.00 DYSPNEA, UNSPECIFIED TYPE: Primary | ICD-10-CM

## 2025-03-17 DIAGNOSIS — G89.29 OTHER CHRONIC PAIN: ICD-10-CM

## 2025-03-17 DIAGNOSIS — Z09 HOSPITAL DISCHARGE FOLLOW-UP: Primary | ICD-10-CM

## 2025-03-17 DIAGNOSIS — E11.3592 PROLIFERATIVE DIABETIC RETINOPATHY OF LEFT EYE ASSOCIATED WITH TYPE 2 DIABETES MELLITUS, UNSPECIFIED PROLIFERATIVE RETINOPATHY TYPE: ICD-10-CM

## 2025-03-17 DIAGNOSIS — R19.7 DIARRHEA, UNSPECIFIED TYPE: ICD-10-CM

## 2025-03-17 DIAGNOSIS — N17.9 ACUTE KIDNEY INJURY SUPERIMPOSED ON STAGE 3B CHRONIC KIDNEY DISEASE: ICD-10-CM

## 2025-03-17 DIAGNOSIS — J42 CHRONIC BRONCHITIS, UNSPECIFIED CHRONIC BRONCHITIS TYPE: ICD-10-CM

## 2025-03-17 DIAGNOSIS — N18.32 ACUTE KIDNEY INJURY SUPERIMPOSED ON STAGE 3B CHRONIC KIDNEY DISEASE: ICD-10-CM

## 2025-03-17 DIAGNOSIS — I44.2 CHB (COMPLETE HEART BLOCK): Chronic | ICD-10-CM

## 2025-03-17 DIAGNOSIS — S32.301S: ICD-10-CM

## 2025-03-17 PROCEDURE — 3066F NEPHROPATHY DOC TX: CPT | Mod: HCNC,CPTII,S$GLB, | Performed by: PHYSICIAN ASSISTANT

## 2025-03-17 PROCEDURE — 3008F BODY MASS INDEX DOCD: CPT | Mod: HCNC,CPTII,S$GLB, | Performed by: PHYSICIAN ASSISTANT

## 2025-03-17 PROCEDURE — 1111F DSCHRG MED/CURRENT MED MERGE: CPT | Mod: HCNC,CPTII,S$GLB, | Performed by: PHYSICIAN ASSISTANT

## 2025-03-17 PROCEDURE — 1159F MED LIST DOCD IN RCRD: CPT | Mod: HCNC,CPTII,S$GLB, | Performed by: PHYSICIAN ASSISTANT

## 2025-03-17 PROCEDURE — 1160F RVW MEDS BY RX/DR IN RCRD: CPT | Mod: HCNC,CPTII,S$GLB, | Performed by: PHYSICIAN ASSISTANT

## 2025-03-17 PROCEDURE — 3044F HG A1C LEVEL LT 7.0%: CPT | Mod: HCNC,CPTII,S$GLB, | Performed by: PHYSICIAN ASSISTANT

## 2025-03-17 PROCEDURE — 3078F DIAST BP <80 MM HG: CPT | Mod: HCNC,CPTII,S$GLB, | Performed by: PHYSICIAN ASSISTANT

## 2025-03-17 PROCEDURE — 3288F FALL RISK ASSESSMENT DOCD: CPT | Mod: HCNC,CPTII,S$GLB, | Performed by: PHYSICIAN ASSISTANT

## 2025-03-17 PROCEDURE — 1100F PTFALLS ASSESS-DOCD GE2>/YR: CPT | Mod: HCNC,CPTII,S$GLB, | Performed by: PHYSICIAN ASSISTANT

## 2025-03-17 PROCEDURE — 99214 OFFICE O/P EST MOD 30 MIN: CPT | Mod: HCNC,S$GLB,, | Performed by: PHYSICIAN ASSISTANT

## 2025-03-17 PROCEDURE — 1125F AMNT PAIN NOTED PAIN PRSNT: CPT | Mod: HCNC,CPTII,S$GLB, | Performed by: PHYSICIAN ASSISTANT

## 2025-03-17 PROCEDURE — 3077F SYST BP >= 140 MM HG: CPT | Mod: HCNC,CPTII,S$GLB, | Performed by: PHYSICIAN ASSISTANT

## 2025-03-17 PROCEDURE — 99999 PR PBB SHADOW E&M-EST. PATIENT-LVL V: CPT | Mod: PBBFAC,HCNC,, | Performed by: PHYSICIAN ASSISTANT

## 2025-03-17 RX ORDER — OXYCODONE HYDROCHLORIDE 10 MG/1
10 TABLET ORAL EVERY 6 HOURS PRN
Qty: 20 TABLET | Refills: 0 | Status: SHIPPED | OUTPATIENT
Start: 2025-03-17

## 2025-03-17 NOTE — TELEPHONE ENCOUNTER
No care due was identified.  [facility] Embedded Care Due Messages. Reference number: [id number].   3/17/2025 11:33:05 AM CDT

## 2025-03-17 NOTE — PROGRESS NOTES
"Subjective     Patient ID: Lorena Contreras is a 74 y.o. female HTN, HLD,  DM, CAD, history of stroke, complete heart block status post dual chamber cardiac pacemaker placed 2/17/2025, pulmonary hypertension, chronic diastolic heart failure, peripheral vascular disease, chronic kidney disease stage 3b (on hemodialysis since 2/22/2025), anemia, follicular lymphoma diagnosed in 2009 treated with chemotherapy, GERD, constipation, restless legs syndrome, fibromyalgia, anxiety, depression     Chief Complaint: Hospital Follow Up    HPI    Established pt of Jorge Paris MD (new to me)    Pt attended by Noelle flowers.       Here for hospital discharge follow up after 2 admissions over the past month for CHB s/p PPM, NSTEMI, KYA (see hospital course below). Now on HD to manage hypervolemia as pt has been anuric.     Today pt states she feels "fernando" happy to be home from the hospital. Appetite is better. Breathing is stable, finds oxycodone is helpful (started by palliative care inpatient). Working with HH PT. Standing from sitting unassisted.   Having some diarrhea since discharge, imodium not helping. Attributes possibly to irritable bowel/anxiety. hx of c.diff years ago. Dialysis going well.     Admission Date: 3/2/2025  Hospital Length of Stay: 9 days  Discharge Date and Time: 3/12/2025  5:43 PM                She presented to [facility] Emergency Department on 3/2/2025 with worsening shortness of breath and chest tightness since being discharged from [facility] on 2/25/2025, where she was admitted on 2/15/2025 for complete heart block, acute kidney injury, and non-ST elevation myocardial infarction. She had mild improvement of symptoms after hemodialysis lasting several hours. She had associated dyspnea on exertion, significant swelling of bilateral lower extremities and abdomen, orthopnea, wheezing, fatigue, and generalized weakness. Chest tightness was non-radiating, substernal, " and worse with musculoskeletal manipulation and deep breathing, but unaffected by exertion and positioning. She had diffuse leg pain due to swelling, generalized abdominal discomfort described as dull, nagging pain, and nausea and vomiting. She has been anuric since starting dialysis. She had not missed any dialysis treatments.              In the emergency department, she had tachypnea and mild tachycardia. She was placed on 2 liters/minute of supplemental oxygen for comfort. Labs showed stable anemia and elevated WBC (71102/uL), bicarbonate 20 mmol/L, creatinine 2.1 mg/dL (baseline prior to starting dialysis was around 2.0), glucose 194 mg/dL, calcium 8.6 mg/dL, albumin 3.1 g/dL, BNP 1335 pg/mL, high sensitivity troponin 64 ng/L (repeat 57). EKG showed sinus rhythm with bigeminy, rate 70 bpm, no ST elevation or depression. Chest X-ray showed cardiomegaly with pulmonary edema and bilateral pleural effusions with aeration similar to mildly worse than 2/19/2025. Abdominal ultrasound showed cholelithiasis and gallbladder sludge without evidence of cholecystitis, distended gallbladder, hepatomegaly, bilateral pleural effusions. She was given acetaminophen and 100 mg of IV furosemide. She was admitted to Hospital Medicine Team S.     Hospital Course:   Nephrology was consulted for dialysis. She urinated 300 mL after furosemide. She had not urinated since started dialysis. She was put on IV furosemide 80 mg twice daily. She takes oxycodone 10 mg at home and requested something stronger. Oxycodone was increased to her home dose. On 3/5/2025, furosemide was changed to 80 mg by mouth, which was ineffective. It was changed to bumetanide 4 mg, which was also ineffective. Nephrology recommended Palliative Care consult for medication management of air hunger. Palliative Care recommended oxycodone extended release 10 mg twice daily and oxycodone immediate release 10 mg every 6 hours as needed. On 3/7/2025 she reported numbness  and tingling in her lower extremities that is chronic and intermittent (she has a prior diagnosis of peripheral neuropathy on her chart since 2015). Vitamin B12 level was high. Further evaluation of this can continue outpatient since she has had the problem for at least 10 years. By 3/9/2025, her net fluid status since admission was negative 10.878 liters. She reported shortness of breath while breathing through her mouth so was put on Venturi mask. Repeat chest X-ray on 3/10/2025 showed no change. On 3/11/2025, she requested Physical and Occupational Therapy evaluations. She reported that she was able to walk prior to her pacemaker placement, which was only 13 days prior to this admission, and has been debilitated since. She was weaned off nasal cannula on 3/11/2025. Home health was set up with physical therapy, occupational therapy, hospital bed and Nighat lift.        Past Medical History:   Diagnosis Date    Age-related osteoporosis with current pathological fracture with routine healing 11/13/2024    Allergy     Altered mental status 06/19/2022    DYSARTHRIA, SPASTIC MOVEMENTS & DIFFICULTY SWALLOWING    Anemia     Anxiety     Arthritis     C. difficile colitis 09/29/2024    Currently treated with po vancomycin      Cataract     both removed    Colon polyps     Coronary artery disease     Depression     Diabetes mellitus, type II     Disorder of kidney and ureter     Epilepsia partialis continua 04/28/2023    Fibromyalgia     Follicular lymphoma     GERD (gastroesophageal reflux disease)     HTN (hypertension)     Hyperlipidemia     MI (myocardial infarction) 03/2019    Mild non proliferative diabetic retinopathy 08/13/2024    Palliative care encounter 10/22/2021    Personal history of colonic polyps     Restless leg syndrome     Stroke      Social History[1]  Review of patient's allergies indicates:   Allergen Reactions    Novolin 70/30 (semi-synthetic) Nausea And Vomiting     Severe vomiting on Relion 70/30     "Sulfa (sulfonamide antibiotics) Anaphylaxis    Talwin [pentazocine lactate] Anaphylaxis    Victoza [liraglutide] Nausea And Vomiting    Glipizide Nausea Only    Codeine     Influenza virus vaccines Hives    Iodine and iodide containing products Hives    Levetiracetam Itching    Lyrica [pregabalin] Hallucinations    Neurontin [gabapentin]      Possible associated myoclonic jerk    Rituxan [rituximab] Hives    Zoloft [sertraline] Nausea And Vomiting         Review of Systems   Constitutional:  Positive for fatigue. Negative for chills, diaphoresis, fever and night sweats.   Respiratory:  Positive for shortness of breath (chronic stable, improving, anxiety/airhunger) and wheezing (occ resolved with albuterol). Negative for cough.    Cardiovascular:  Negative for chest pain and leg swelling.   Gastrointestinal:  Positive for diarrhea. Negative for abdominal pain, nausea and vomiting.   Musculoskeletal:  Positive for arthralgias.   Integumentary:  Negative for rash.   Psychiatric/Behavioral:  The patient is nervous/anxious.           Objective  BP (!) 146/60 (BP Location: Right arm, Patient Position: Sitting)   Pulse 63   Ht 5' 9" (1.753 m)   Wt 85.7 kg (188 lb 15 oz)   SpO2 96%   BMI 27.90 kg/m²       Physical Exam  Vitals reviewed.   Constitutional:       General: She is not in acute distress.     Appearance: She is well-developed.   HENT:      Head: Normocephalic and atraumatic.   Cardiovascular:      Rate and Rhythm: Normal rate and regular rhythm.      Heart sounds: Murmur heard.   Pulmonary:      Effort: Pulmonary effort is normal.      Breath sounds: Normal breath sounds. No wheezing or rales.   Abdominal:      General: Bowel sounds are normal.      Palpations: Abdomen is soft.      Tenderness: There is no abdominal tenderness.   Musculoskeletal:      Right lower leg: No edema.      Left lower leg: No edema.      Comments: In wheelchair   Skin:     General: Skin is warm and dry.      Findings: No rash. "   Neurological:      Mental Status: She is alert and oriented to person, place, and time.   Psychiatric:         Mood and Affect: Mood normal.            Assessment and Plan     1. Hospital discharge follow-up  Discharge summary, notes, consults and lab reviewed  Discussed Rx oxycodone refill with PCP  -     Ambulatory referral/consult to HOME Palliative Care; Future; Expected date: 03/24/2025    2. Acute kidney injury superimposed on stage 3b chronic kidney disease  Now on HD   Followed by Nephrology  -     Ambulatory referral/consult to HOME Palliative Care; Future; Expected date: 03/24/2025    3. CHB (complete heart block)  S/p PPM  Stable  Followed by cardiology  -     Ambulatory referral/consult to HOME Palliative Care; Future; Expected date: 03/24/2025    4. Diarrhea, unspecified type  Advised on fiber to bulk stool, imodium rpn  Check stool studies  -     Giardia / Cryptosporidum, EIA; Future; Expected date: 03/17/2025  -     Clostridium difficile EIA; Future; Expected date: 03/17/2025  -     Stool culture; Future; Expected date: 03/17/2025    5. Proliferative diabetic retinopathy of left eye associated with type 2 diabetes mellitus, unspecified proliferative retinopathy type  Stable, eye exam UTD  Followed by Ophthalmology     6. Chronic bronchitis, unspecified chronic bronchitis type  Stable on current inhalers  Followed by PCP    Keep PCP f/u next month  ED precautions discussed    Future Appointments   Date Time Provider Department Center   3/18/2025  9:45 AM CHAIR 08, [place] [facility]   3/20/2025  9:45 AM CHAIR 08, [place] [facility]   3/22/2025  9:45 AM CHAIR 08, [place] [facility]   3/25/2025  9:45 AM CHAIR 08, [place] [facility]   3/27/2025  9:45 AM CHAIR 08, [place] [facility]   3/29/2025  9:45 AM CHAIR 08, [place] [facility]   4/1/2025  9:45 AM  CHAIR 08, [place] [facility]   4/3/2025  9:45 AM CHAIR 08, [place] [facility]   4/5/2025  9:45 AM CHAIR 05, [place] [facility]   4/8/2025  9:45 AM CHAIR 05, [place] [facility]   4/10/2025  9:45 AM CHAIR 05, [place] [facility]   4/12/2025  9:45 AM CHAIR 01, [place] [facility]   4/14/2025  9:00 AM Jorge Paris MD [facility]              [1]   Social History  Tobacco Use    Smoking status: Never    Smokeless tobacco: Never   Substance Use Topics    Alcohol use: Not Currently    Drug use: Never

## 2025-03-18 ENCOUNTER — LAB VISIT (OUTPATIENT)
Dept: LAB | Facility: HOSPITAL | Age: 75
End: 2025-03-18
Payer: MEDICARE

## 2025-03-18 ENCOUNTER — TELEPHONE (OUTPATIENT)
Dept: FAMILY MEDICINE | Facility: CLINIC | Age: 75
End: 2025-03-18
Payer: MEDICARE

## 2025-03-18 ENCOUNTER — PATIENT OUTREACH (OUTPATIENT)
Facility: OTHER | Age: 75
End: 2025-03-18
Payer: MEDICARE

## 2025-03-18 ENCOUNTER — PATIENT MESSAGE (OUTPATIENT)
Dept: INTERNAL MEDICINE | Facility: CLINIC | Age: 75
End: 2025-03-18
Payer: MEDICARE

## 2025-03-18 DIAGNOSIS — R19.7 DIARRHEA, UNSPECIFIED TYPE: ICD-10-CM

## 2025-03-18 LAB
C DIFF GDH STL QL: POSITIVE
C DIFF TOX A+B STL QL IA: POSITIVE

## 2025-03-18 PROCEDURE — 87449 NOS EACH ORGANISM AG IA: CPT | Mod: 91,HCNC | Performed by: PHYSICIAN ASSISTANT

## 2025-03-18 PROCEDURE — 87045 FECES CULTURE AEROBIC BACT: CPT | Mod: HCNC | Performed by: PHYSICIAN ASSISTANT

## 2025-03-18 PROCEDURE — 87046 STOOL CULTR AEROBIC BACT EA: CPT | Mod: HCNC | Performed by: PHYSICIAN ASSISTANT

## 2025-03-18 PROCEDURE — 87328 CRYPTOSPORIDIUM AG IA: CPT | Mod: HCNC | Performed by: PHYSICIAN ASSISTANT

## 2025-03-18 PROCEDURE — 87324 CLOSTRIDIUM AG IA: CPT | Mod: HCNC | Performed by: PHYSICIAN ASSISTANT

## 2025-03-18 PROCEDURE — 87427 SHIGA-LIKE TOXIN AG IA: CPT | Mod: HCNC | Performed by: PHYSICIAN ASSISTANT

## 2025-03-18 NOTE — PROGRESS NOTES
Per chart review, patient attended primary care appointment on 3/17/25. Called patient to follow up and assess additional needs, no answer, LVM asking for return call.

## 2025-03-18 NOTE — TELEPHONE ENCOUNTER
Call placed to patient and spoke with daughter Noelle. Notified that MD approved and e-sent prescription for oxycodone to patients pharmacy. Noelle verbalized they were notified by  nurse as well. Understanding and thank you verbalized.

## 2025-03-18 NOTE — TELEPHONE ENCOUNTER
----- Message from Cassius sent at 3/17/2025  2:57 PM CDT -----  Contact: [phone]  Pharmacy is calling to clarify an RX.RX name:  LORazepam (ATIVAN) 1 MG tablet & oxyCODONE (ROXICODONE) 10 mg Tab immediate release tabletWhat do they need to clarify:  drug interactionComments:

## 2025-03-19 ENCOUNTER — TELEPHONE (OUTPATIENT)
Dept: INTERNAL MEDICINE | Facility: CLINIC | Age: 75
End: 2025-03-19
Payer: MEDICARE

## 2025-03-19 DIAGNOSIS — A04.72 C. DIFFICILE DIARRHEA: Primary | ICD-10-CM

## 2025-03-19 LAB
CRYPTOSP AG STL QL IA: NEGATIVE
G LAMBLIA AG STL QL IA: NEGATIVE

## 2025-03-19 RX ORDER — VANCOMYCIN HYDROCHLORIDE 125 MG/1
125 CAPSULE ORAL 4 TIMES DAILY
Qty: 40 CAPSULE | Refills: 0 | Status: SHIPPED | OUTPATIENT
Start: 2025-03-19 | End: 2025-03-29

## 2025-03-19 NOTE — TELEPHONE ENCOUNTER
Spoke with Dr. Mike Moreno's MA and she is setting up a virtual appointment today to see the needs of the patient. The patient was informed and voiced understanding.

## 2025-03-19 NOTE — TELEPHONE ENCOUNTER
Called to review results. No answer. Will send [employer] msg.     Reviewed chart   Inpatient tx for c. Diff with Vancomycin and Fidaxomicin 9/28  Component      Latest Ref Rng 9/28/2024 10/31/2024 3/18/2025   C. diff Antigen      Negative  Positive !  Negative  Positive !    C difficile Toxins A+B, EIA      Negative  Positive !  Negative  Positive !      Reoccurrence Tx:   Vancomycin 1425 QID for 10 days.

## 2025-03-20 LAB
BACTERIA STL CULT: NORMAL
E COLI SXT1 STL QL IA: NEGATIVE
E COLI SXT2 STL QL IA: NEGATIVE

## 2025-03-27 ENCOUNTER — TELEPHONE (OUTPATIENT)
Dept: INTERNAL MEDICINE | Facility: CLINIC | Age: 75
End: 2025-03-27
Payer: MEDICARE

## 2025-03-27 ENCOUNTER — DOCUMENT SCAN (OUTPATIENT)
Dept: HOME HEALTH SERVICES | Facility: HOSPITAL | Age: 75
End: 2025-03-27
Payer: MEDICARE

## 2025-03-27 ENCOUNTER — OFFICE VISIT (OUTPATIENT)
Dept: PALLIATIVE MEDICINE | Facility: CLINIC | Age: 75
End: 2025-03-27
Payer: MEDICARE

## 2025-03-27 DIAGNOSIS — L89.156 PRESSURE INJURY OF DEEP TISSUE OF SACRAL REGION: ICD-10-CM

## 2025-03-27 DIAGNOSIS — N17.9 ACUTE KIDNEY INJURY SUPERIMPOSED ON STAGE 3B CHRONIC KIDNEY DISEASE: ICD-10-CM

## 2025-03-27 DIAGNOSIS — I44.2 CHB (COMPLETE HEART BLOCK): Chronic | ICD-10-CM

## 2025-03-27 DIAGNOSIS — R26.89 IMPAIRED GAIT AND MOBILITY: ICD-10-CM

## 2025-03-27 DIAGNOSIS — G89.21 CHRONIC PAIN AFTER TRAUMATIC INJURY: ICD-10-CM

## 2025-03-27 DIAGNOSIS — N18.6 ESRD (END STAGE RENAL DISEASE): Primary | ICD-10-CM

## 2025-03-27 DIAGNOSIS — Z95.0 CARDIAC PACEMAKER IN SITU: Chronic | ICD-10-CM

## 2025-03-27 DIAGNOSIS — Z09 HOSPITAL DISCHARGE FOLLOW-UP: ICD-10-CM

## 2025-03-27 DIAGNOSIS — N18.32 ACUTE KIDNEY INJURY SUPERIMPOSED ON STAGE 3B CHRONIC KIDNEY DISEASE: ICD-10-CM

## 2025-03-27 PROCEDURE — 99999 PR PBB SHADOW E&M-EST. PATIENT-LVL IV: CPT | Mod: PBBFAC,HCNC,, | Performed by: STUDENT IN AN ORGANIZED HEALTH CARE EDUCATION/TRAINING PROGRAM

## 2025-03-27 NOTE — PROGRESS NOTES
Palliative Medicine Home Visit Note - [facility]    Consult Requested By: Ann Deshpande    Primary Care Physician:   Jorge Paris MD    Reason for Consult: Advance care planning and symptom management in the setting of multi-comorbidity    ASSESSMENT/PLAN:     Ms. Lorena Contreras is a 74 year old woman with relevant history of complete heart block with pacemaker, diastrolic HF, HTN, HLD, CAD, pHTN, aortic stenosis, ESRD on HD, follicular lymphoma s/p tx 2009, T2DM, PAD, hip fx history, chronic wounds establishing with palliative care via home visit for management of symptoms and advance care planning. Patient seen in their home accompanied by her sister Nelia and her daughter Noelle.    She recently had multiple hospitalizations which resulted in complete heart block, pacemaker insertion, and new ESRD diagnosis and dialysis dependence. This has taken a significant toll on her physically and mentally, but she is hopeful for the future and feels she is tolerating her new treatment well.     Plan/Recommendations:    #Pain - Chronic MSK and post-traumatic pain in her right leg, back, which underwent several surgeries after a fall resulting in multiple fractures. Initiated on oxycodone 10mg for air hunger in the hospital, has been benefiting from pain control as well. Family often is trying to give only 5mg dose for control to avoid excess medication. Long discussion held, benefit of this medication to allow comfortable participation in PT/OT, pain control to allow adequate sleep and maximum functional status outweighs risk at this time. Family knows to always try to use other methods to treat pain, including non-medication such as massage, heat/cold. Also discussed interplay between mental health and chronic pain, see below for management of anxiety. I will take over her opioid prescription today as she only has 4 tabs left.  - 28 day script of oxycodone 10mg q8h PRN #84 tabs 0 refills sent to  pharmacy  - will check in regularly on how pain control and functional status changes in the coming months  - currently on fluoxetine as opposed to SNRI, but just began this medicine and is having good response for anxiety symptoms, will hold off on changing for now    #Anxiety - Lifelong history of anxiety per family and patient. Has been on fluoxetine since hospitalization in early Feb, with some noticeable improvement in mood per daughter. Still with anxiety. Takes low dose oral Ativan appx once daily for anxiety symptoms. Triggers are interacting with healthcare providers.   - patient accepts my offer for palliative talk therapy referral for virtual visit to discuss other coping strategies for chronic pain and anxiety  - continue fluoxetine  - she has plenty of PRN ativan for now, discussed turning to other strategies to manage panic attacks or bouts of worsened anxiety, she is open to this    #Diarrhea - Recent recurrent Cdiff infection. Has 3 days left on oral vancomycin. Diarrhea is improving, but not totally resolved. Discussed history of bowel habits, usually fluctuates between being constipated and having diarrhea. Spoke about using OTC stool softeners as needed in the future.    Advance Care Planning   Advance Directives:   Living Will: No    [place]: Yes (Completed today 3/28/2025)    Do Not Resuscitate Status: Yes    Medical Power of : Yes      Decision Making:  Patient answered questions and Family answered questions  Goals of Care: The patient and family endorses that what is most important right now is to focus on avoiding the hospital, remaining as independent as possible, improvement in condition but with limits to invasive therapies, and comfort and QOL     Accordingly, we have decided that the best plan to meet the patient's goals includes continuing with treatment as she is tolerating HD and getting benefit from restorative measures like PT/OT at home.     Discussed [facility] document at  length with her and her family, completed to reflect wishes for DNR, otherwise FULL Treatment, okay with short term trial of other life supporting measures, but would not accept long-term life support measures other than dialysis. Would not accept even a short term trial of NGT for artificial nutrition.     Understanding of Disease and Illness Trajectory: Patient  has  adequate understanding of her illness, they can benefit from continued education on what to expect in the future.     Follow up: With me in 2-3 months via home visit.    Plan discussed with patient, daughter, and sister    SUBJECTIVE:     History of Present Illness / Interval History:  Discussed the role of palliative care, in that we can be helpful in helping patients feel better, aid in communication, and planning for future difficult decisions.     2/15/25-2/25/25 Hospitalized for chest pain, found to have complete heart block. Triple vessel disease s/p CABG in the past, LHC done 2/19/25 showing patent stents, moderate LAD/RCA disease. Received pacemaker, required dialysis for KYA on CKD thought to be related to contrast induced nephropathy.    3/2/25-3/12/25 Hospitalized for fluid retention, SOB, chest tightness. Required dialysis to remove fluid, stabilized and sent home with HH, equipment including hospital bed, isaac lift, PT/OT.     Review of Symptoms      Symptom Assessment (ESAS 0-10 Scale)  Unable to complete assessment due to Other     CAM / Delirium:  Negative  Constipation:  Negative  Diarrhea:  Positive      Bowel Management Plan (BMP):  Yes      Pain Assessment in Advanced Demential Scale (PAINAD)   Breathing - Independent of vocalization:  0 (patient does not carry dementia diagnosis)  Negative vocalization:  0  Facial expression:  0  Body language:  0  Consolability:  0  Total:  0    Performance Status:  50    Living Arrangements:  Lives with family    Psychosocial/Cultural:   See Palliative Psychosocial Note: No  Social Issues  Identified: Coping deficit pt/family and New Diagnosis/Trauma  Bereavement Risk: No  Caregiver Needs Discussed. Caregiver Distress: No: N/A  Cultural: no needs identified today  **Primary  to Follow**  Palliative Care  Consult: Yes     Time-Based Charting:  Yes  Chart Review: 30 minutes  Face to Face: 45 minutes  Advance Care Plannin minutes    Total Time Spent: 95 minutes    Completed as ROS today. Positive for pain in right leg, lower back, buttocks where known pressure wound is located, for anxiety, occasional insomnia, occasional diarrhea which is improving. Negative for nausea, vomiting, constipation.     Active Diagnoses & Disease History:  complete heart block with pacemaker, diastrolic HF, HTN, HLD, CAD, pHTN, aortic stenosis, ESRD on HD, follicular lymphoma s/p tx , T2DM, PAD, hip fx history, chronic wounds    Previous experience or exposure to a serious illness: Yes    Medications:  Current Medications[1]    External  database queried on 3/27/25 by Jessica Horton.   The results reviewed and considered with the clinical data in the decision whether or not to prescribe a controlled substance.    Past Medical History:   Diagnosis Date    Age-related osteoporosis with current pathological fracture with routine healing 2024    Allergy     Altered mental status 2022    DYSARTHRIA, SPASTIC MOVEMENTS & DIFFICULTY SWALLOWING    Anemia     Anxiety     Arthritis     C. difficile colitis 2024    Currently treated with po vancomycin      Cataract     both removed    Colon polyps     Coronary artery disease     Depression     Diabetes mellitus, type II     Disorder of kidney and ureter     Epilepsia partialis continua 2023    Fibromyalgia     Follicular lymphoma     GERD (gastroesophageal reflux disease)     HTN (hypertension)     Hyperlipidemia     MI (myocardial infarction) 2019    Mild non proliferative diabetic retinopathy 2024    Palliative care  encounter 10/22/2021    Personal history of colonic polyps     Restless leg syndrome     Stroke      Past Surgical History:   Procedure Laterality Date    A-V CARDIAC PACEMAKER INSERTION N/A 2/17/2025    Procedure: INSERTION, CARDIAC PACEMAKER, DUAL CHAMBER;  Surgeon: Karl Rico MD;  Location: [facility] CATH LAB;  Service: Cardiology;  Laterality: N/A;    APPLICATION OF WOUND VACUUM-ASSISTED CLOSURE DEVICE Right 9/25/2024    Procedure: APPLICATION, WOUND VAC;  Surgeon: Neto Davalos MD;  [address];  Service: Orthopedics;  Laterality: Right;    COLONOSCOPY  11/07/2012    Colon polyp found; repeat in 5 years    COLONOSCOPY N/A 11/4/2024    Procedure: COLONOSCOPY;  Surgeon: David Castaneda MD;  Location: [facility] (2ND FLR);  Service: Endoscopy;  Laterality: N/A;    DEBRIDEMENT OF LOCAL FLAP Right 9/25/2024    Procedure: DEBRIDEMENT, LOCAL FLAP;  Surgeon: Neto Davalos MD;  [address];  Service: Orthopedics;  Laterality: Right;    ELBOW SURGERY Right 2015    dislocation repair     ESOPHAGOGASTRODUODENOSCOPY  11/07/2012    atrophic gastritis, H pylori testing negative    INCISION AND DRAINAGE FOOT Right 6/2/2021    Procedure: INCISION AND DRAINAGE, FOOT, bone biopsy;  Surgeon: Quiana Penn DPM;  Location: [facility] OR;  Service: Podiatry;  Laterality: Right;    IRRIGATION AND DEBRIDEMENT OF LOWER EXTREMITY Right 9/25/2024    Procedure: IRRIGATION AND DEBRIDEMENT, LOWER EXTREMITY; slider table, supine, bone foam, cysto tubing, 6L NS/dakins/peroxide, culture swabs;  Surgeon: Neto Davalos MD;  [address];  Service: Orthopedics;  Laterality: Right;    KNEE SURGERY Bilateral 2015    scoped    LEFT HEART CATHETERIZATION Left 3/29/2019    Procedure: Left heart cath;  Surgeon: Bladimir Barbosa MD;  Location: [facility] CATH LAB;  Service: Cardiology;  Laterality: Left;    LEFT HEART CATHETERIZATION Left 11/18/2019    Procedure: Left heart cath;   Surgeon: Karl Rico MD;  Location: [facility] CATH LAB;  Service: Cardiology;  Laterality: Left;    LEFT HEART CATHETERIZATION Left 1/8/2020    Procedure: Left heart cath, right radial, noon start;  Surgeon: Christos Monreal MD;  Location: [facility] CATH LAB;  Service: Cardiology;  Laterality: Left;  RN Pre Op 1-6-20.  To be admitted 1-7-20 sor Aspirin Disensitation    LEFT HEART CATHETERIZATION Left 2/19/2025    Procedure: Left heart cath;  Surgeon: Karl Rico MD;  Location: [facility] CATH LAB;  Service: Cardiology;  Laterality: Left;    OPEN REDUCTION AND INTERNAL FIXATION (ORIF) OF FRACTURE OF ACETABULUM Right 8/16/2024    Procedure: ORIF, FRACTURE, ACETABULUM;  Surgeon: Neto Davalos MD;  Location: [address];  Service: Orthopedics;  Laterality: Right;  anterior and lateral pelvic incisions    OPEN REDUCTION AND INTERNAL FIXATION (ORIF) OF PILON FRACTURE Right 8/14/2024    Procedure: ORIF, FRACTURE, PILON;  Surgeon: Neto Davalos MD;  Location: [address];  Service: Orthopedics;  Laterality: Right;    TONSILLECTOMY  1955    ULTRASOUND GUIDANCE  1/8/2020    Procedure: Ultrasound Guidance;  Surgeon: Christos Monreal MD;  Location: [facility] CATH LAB;  Service: Cardiology;;     Family History   Problem Relation Name Age of Onset    Cancer Mother          colon    Heart disease Mother      Cancer Father          lung    Lung cancer Brother      Diabetes Sister      Hypertension Sister      Allergy (severe) Daughter erin     No Known Problems Daughter      Stroke Neg Hx      Hyperlipidemia Neg Hx       Review of patient's allergies indicates:   Allergen Reactions    Novolin 70/30 (semi-synthetic) Nausea And Vomiting     Severe vomiting on Relion 70/30    Sulfa (sulfonamide antibiotics) Anaphylaxis    Talwin [pentazocine lactate] Anaphylaxis    Victoza [liraglutide] Nausea And Vomiting    Glipizide Nausea Only    Codeine     Influenza virus vaccines Hives    Iodine and iodide containing  "products Hives    Levetiracetam Itching    Lyrica [pregabalin] Hallucinations    Neurontin [gabapentin]      Possible associated myoclonic jerk    Rituxan [rituximab] Hives    Zoloft [sertraline] Nausea And Vomiting     OBJECTIVE:     Vitals: Temp: 97.9 °F (36.6 °C) (03/28/25 0729)  Pulse: 65 (03/28/25 0729)  Resp: 19 (03/28/25 0729)  BP: (!) 140/70 (03/28/25 0729)  SpO2: 98 % (03/28/25 0729)  Physical Exam  Constitutional:       General: She is not in acute distress.     Appearance: She is ill-appearing.   HENT:      Head: Normocephalic and atraumatic.      Mouth/Throat:      Mouth: Mucous membranes are dry.      Pharynx: No oropharyngeal exudate.   Eyes:      General: No scleral icterus.     Extraocular Movements: Extraocular movements intact.   Cardiovascular:      Rate and Rhythm: Normal rate and regular rhythm.      Heart sounds: Murmur heard.   Pulmonary:      Effort: Pulmonary effort is normal. No respiratory distress.      Breath sounds: Normal breath sounds.   Abdominal:      General: Abdomen is flat. There is no distension.      Palpations: Abdomen is soft.   Musculoskeletal:      Right lower leg: No edema.      Left lower leg: No edema.   Skin:     General: Skin is warm and dry.      Coloration: Skin is not jaundiced.      Findings: Bruising present.      Comments: Sacral wound not examined today.   Neurological:      Mental Status: She is alert.      Comments: Globally weak, but able to participate in all aspects of complex medical discussion today, including goals of care.   Psychiatric:      Comments: Cheerful affect. Endorses anxiety and being "high strung".     Labs:  CBC:   WBC   Date Value Ref Range Status   03/20/2025 13.41 (H) 4.80 - 10.80 1000/mcL Final     Hemoglobin   Date Value Ref Range Status   03/20/2025 9.7 (L) 12.0 - 16.0 g/dL Final     POC Hematocrit   Date Value Ref Range Status   08/16/2024 24 (L) 36 - 54 %PCV Final     Hematocrit   Date Value Ref Range Status   03/20/2025 30.5 (L) " 37.0 - 47.0 % Final     MCV   Date Value Ref Range Status   03/20/2025 93 80 - 100 fl Final     Platelets   Date Value Ref Range Status   03/20/2025 349 130 - 400 1000/mcL Final     Lab Results   Component Value Date    CREATININE 2.06 (H) 03/20/2025    BUN 21 03/10/2025     03/20/2025    K 3.6 03/20/2025     03/20/2025    CO2 25 03/10/2025      LFT:   Lab Results   Component Value Date    AST 23 03/10/2025    ALKPHOS 116 (H) 03/20/2025    BILITOT 0.4 03/10/2025     Albumin:   Albumin   Date Value Ref Range Status   03/20/2025 3.1 (L) 3.5 - 5.2 g/dL Final     Protein:   Total Protein   Date Value Ref Range Status   03/10/2025 5.8 (L) 6.0 - 8.4 g/dL Final     Radiology:  X-Ray Chest AP Portable  Narrative: EXAMINATION:  XR CHEST AP PORTABLE    CLINICAL HISTORY:  CHF, hypoxia worse despite days of dialysis;    TECHNIQUE:  Single frontal view of the chest was performed.    COMPARISON:  March 2, 2025    FINDINGS:  Stable position of large-bore right jugular catheter, tip superimposing cavoatrial soft tissues.  Stable enlargement of cardiopericardial silhouette, arch calcification, and left-sided dual lead pacing device.  Stable pulmonary vascular congestion, interstitial edema, and left greater than right lower lung zone areas of at atelectasis and or infiltrate and or edema and bilateral pleural effusions.  No right or left pneumothoraces.  EKG leads superimposed chest and abdomen.  Impression: No significant interval changes.    Electronically signed by: Rafi Cruz  Date:    03/10/2025  Time:    10:34     Geovanny Horton MD  Hospice and Palliative Medicine  [facility]         [1]   Current Outpatient Medications:     albuterol (PROVENTIL/VENTOLIN HFA) 90 mcg/actuation inhaler, Inhale 2 puffs into the lungs every 6 (six) hours as needed for Wheezing or Shortness of Breath., Disp: 18 g, Rfl: 1    aluminum & magnesium hydroxide-simethicone (MYLANTA MAX STRENGTH) 400-400-40 mg/5 mL suspension, Take  "30 mLs by mouth every 6 (six) hours as needed for Indigestion., Disp: , Rfl:     aspirin 81 MG Chew, Take 1 tablet (81 mg total) by mouth once daily. Hold to avoid bleed risk on triple therapy and then resume on oct 27 once eliquis stops on oct 26th, Disp: , Rfl:     atorvastatin (LIPITOR) 80 MG tablet, Take 1 tablet (80 mg total) by mouth every evening., Disp: 90 tablet, Rfl: 3    DEXCOM G7  Misc, Use 1  to track blood glucose, ICD10: E11.65, Disp: 1 each, Rfl: 0    DEXCOM G7 SENSOR Samina, Use 1 sensor every 10 days to track blood glucose, ICD10: E11.65, okay with 90 day supply if possible, Disp: 3 each, Rfl: 11    FLUoxetine 40 MG capsule, Take 1 capsule (40 mg total) by mouth once daily., Disp: 90 capsule, Rfl: 3    hydrALAZINE (APRESOLINE) 50 MG tablet, Take 1 tablet (50 mg total) by mouth every 8 (eight) hours., Disp: 135 tablet, Rfl: 3    insulin aspart U-100 (NOVOLOG) 100 unit/mL (3 mL) InPn pen, Inject 0-10 Units into the skin before meals and at bedtime as needed (Hyperglycemia)., Disp: , Rfl:     insulin glargine U-100, Lantus, 100 unit/mL (3 mL) SubQ InPn pen, Inject 8 Units into the skin every evening., Disp: , Rfl:     isosorbide mononitrate (IMDUR) 60 MG 24 hr tablet, Take 1 tablet (60 mg total) by mouth once daily., Disp: 30 tablet, Rfl: 11    LORazepam (ATIVAN) 1 MG tablet, TAKE 1 TABLET BY MOUTH ONCE DAILY AS NEEDED FOR ANXIETY, Disp: 30 tablet, Rfl: 0    MELATONIN ORAL, Take 1 tablet by mouth nightly as needed (insomnia)., Disp: , Rfl:     multivitamin (THERAGRAN) per tablet, Take 1 tablet by mouth once daily., Disp: , Rfl:     ondansetron (ZOFRAN-ODT) 8 MG TbDL, Dissolve 1 tablet (8 mg total) by mouth every 8 (eight) hours as needed (nausea)., Disp: 20 tablet, Rfl: 2    pantoprazole (PROTONIX) 40 MG tablet, Take 1 tablet (40 mg total) by mouth once daily., Disp: 90 tablet, Rfl: 3    pen needle, diabetic (BD ULTRA-FINE SAGAR PEN NEEDLE) 32 gauge x 5/32" Ndle, One pen needle use with " insulin pen 4 times a day.  ICD-10: E11.9, Disp: 150 each, Rfl: 11    QUEtiapine (SEROQUEL) 50 MG tablet, Take 1 tablet (50 mg total) by mouth nightly as needed (insomnia)., Disp: 90 tablet, Rfl: 3    ticagrelor (BRILINTA) 60 mg tablet, Take 1 tablet (60 mg total) by mouth 2 (two) times daily., Disp: 180 tablet, Rfl: 3    triamcinolone acetonide 0.1% (KENALOG) 0.1 % cream, Apply topically 2 (two) times daily., Disp: 80 g, Rfl: 2    vancomycin (VANCOCIN) 125 MG capsule, Take 1 capsule (125 mg total) by mouth 4 (four) times daily. for 10 days, Disp: 40 capsule, Rfl: 0    DULCOLAX, BISACODYL, ORAL, Take 1 tablet by mouth daily as needed (constipation). (Patient not taking: Reported on 3/28/2025), Disp: , Rfl:     meclizine (ANTIVERT) 12.5 mg tablet, Take 1 tablet (12.5 mg total) by mouth 2 (two) times daily as needed for Dizziness. (Patient taking differently: Take 12.5 mg by mouth daily as needed for Dizziness.), Disp: 28 tablet, Rfl: 0    naloxone (NARCAN) 4 mg/actuation Spry, 4mg by nasal route as needed for opioid overdose; may repeat every 2-3 minutes in alternating nostrils until medical help arrives. Call 911, Disp: 1 each, Rfl: 11    oxyCODONE (ROXICODONE) 10 mg Tab immediate release tablet, Take 1 tablet (10 mg total) by mouth every 8 (eight) hours as needed for Pain., Disp: 84 tablet, Rfl: 0

## 2025-03-28 ENCOUNTER — TELEPHONE (OUTPATIENT)
Dept: PALLIATIVE MEDICINE | Facility: CLINIC | Age: 75
End: 2025-03-28
Payer: MEDICARE

## 2025-03-28 VITALS
OXYGEN SATURATION: 98 % | SYSTOLIC BLOOD PRESSURE: 140 MMHG | RESPIRATION RATE: 19 BRPM | HEART RATE: 65 BPM | DIASTOLIC BLOOD PRESSURE: 70 MMHG | TEMPERATURE: 98 F

## 2025-03-28 RX ORDER — NALOXONE HYDROCHLORIDE 4 MG/.1ML
SPRAY NASAL
Qty: 1 EACH | Refills: 11 | Status: SHIPPED | OUTPATIENT
Start: 2025-03-28

## 2025-03-28 RX ORDER — OXYCODONE HYDROCHLORIDE 10 MG/1
10 TABLET ORAL EVERY 8 HOURS PRN
Qty: 84 TABLET | Refills: 0 | Status: SHIPPED | OUTPATIENT
Start: 2025-03-28 | End: 2025-04-25

## 2025-03-28 NOTE — TELEPHONE ENCOUNTER
Attempted to each pt regarding scheduling an appt with ignacio parsons unable to leave vm vm is full

## 2025-03-28 NOTE — TELEPHONE ENCOUNTER
----- Message from  Pauly sent at 3/28/2025  1:22 PM CDT -----  Regarding: RE: Talk Therapy Referral - A more appropriate one this time  Thanks we will get her scheduled!  ----- Message -----  From: Jessica Horton MD  Sent: 3/28/2025  12:51 PM CDT  To: Pauly Pineda, LUISW, LCSW  Subject: Talk Therapy Referral - A more appropriate o#    Okay a little more conventional one here,Ms. Lorena Contreras is a lady who just had a pretty rough hospital stay resulting in a pacemaker and new ESRD diagnosis. She has a lifelong hx of anxiety and has kind of always lived with the expectation that she just has to deal with it. Her triggers for anxiety are being around healthcare settings, which with her need for dialysis three times a week has been difficult. I would love for y'all to have some virtual sessions about coping strategies for panic attacks and possibly some discussion of insomnia (she is currently waking up most nights with anxiety). I am working on optimizing her medical management as well.Thanks!Geovanny

## 2025-04-01 ENCOUNTER — TELEPHONE (OUTPATIENT)
Dept: INFUSION THERAPY | Facility: HOSPITAL | Age: 75
End: 2025-04-01
Payer: MEDICARE

## 2025-04-07 ENCOUNTER — HOSPITAL ENCOUNTER (OUTPATIENT)
Dept: CARDIOLOGY | Facility: HOSPITAL | Age: 75
Discharge: HOME | End: 2025-04-07
Attending: INTERNAL MEDICINE
Payer: MEDICARE

## 2025-04-07 ENCOUNTER — CLINICAL SUPPORT (OUTPATIENT)
Dept: CARDIOLOGY | Facility: HOSPITAL | Age: 75
End: 2025-04-07
Payer: MEDICARE

## 2025-04-07 DIAGNOSIS — Z95.0 PRESENCE OF CARDIAC PACEMAKER: ICD-10-CM

## 2025-04-07 PROCEDURE — 93294 REM INTERROG EVL PM/LDLS PM: CPT | Mod: HCNC,,, | Performed by: INTERNAL MEDICINE

## 2025-04-07 PROCEDURE — 93296 REM INTERROG EVL PM/IDS: CPT | Mod: HCNC | Performed by: INTERNAL MEDICINE

## 2025-04-08 LAB
OHS CV AF BURDEN PERCENT: 1.6
OHS CV DC REMOTE DEVICE TYPE: NORMAL
OHS CV ICD SHOCK: NO
OHS CV RV PACING PERCENT: 98.04 %

## 2025-04-14 ENCOUNTER — TELEPHONE (OUTPATIENT)
Dept: CARDIOLOGY | Facility: HOSPITAL | Age: 75
End: 2025-04-14
Payer: MEDICARE

## 2025-04-14 NOTE — TELEPHONE ENCOUNTER
----- Message from Luis Guzman sent at 4/14/2025 10:26 AM CDT -----  Regarding: FW: Brooke-Daughter    ----- Message -----  From: Brigette Mayorga  Sent: 4/14/2025  10:02 AM CDT  To: Jacky Barajas Staff  Subject: Brooke-Daughter                                    Type: Patient Call Back Who called:Paulette What is the request in detail:calling to notify that the pt has passed away on 4/12/2025 Can the clinic reply by [facility]? No Would the patient rather a call back or a response via [facility]? Call back Best call back number: [phone] Additional Information: Thank you.

## 2025-04-21 NOTE — PLAN OF CARE
Problem: Adult Inpatient Plan of Care  Goal: Plan of Care Review  Outcome: Not Progressing  Goal: Optimal Comfort and Wellbeing  Outcome: Not Progressing     Problem: Infection  Goal: Absence of Infection Signs and Symptoms  Outcome: Not Progressing     Problem: Mobility Impairment  Goal: Optimal Mobility  Outcome: Not Progressing      Call the number on the back of your insurance card to see if they cover medications for WEIGHT LOSS. Wegovy or Zepbound.

## 2025-05-13 ENCOUNTER — DOCUMENT SCAN (OUTPATIENT)
Dept: HOME HEALTH SERVICES | Facility: HOSPITAL | Age: 75
End: 2025-05-13
Payer: MEDICARE

## 2025-06-11 ENCOUNTER — DOCUMENT SCAN (OUTPATIENT)
Dept: HOME HEALTH SERVICES | Facility: HOSPITAL | Age: 75
End: 2025-06-11
Payer: MEDICARE

## (undated) DEVICE — SPONGE LAP 18X18 PREWASHED

## (undated) DEVICE — DRAPE THREE-QTR REINF 53X77IN

## (undated) DEVICE — DRAPE STERI U-SHAPED 47X51IN

## (undated) DEVICE — CATH SUCTION 10FR

## (undated) DEVICE — CATH DXTERITY JR40 100CM 5FR

## (undated) DEVICE — GUIDEWIRE SAFE T J TIP 145X2.5

## (undated) DEVICE — KIT MANIFOLD LOW PRESS TUBING

## (undated) DEVICE — VALVE CONTROL COPILOT

## (undated) DEVICE — Device

## (undated) DEVICE — TOWEL OR DISP STRL BLUE 4/PK

## (undated) DEVICE — CATH DXTERITY PG145 110CM 6FR

## (undated) DEVICE — OMNIPAQUE CONTRAST 350MG/100ML

## (undated) DEVICE — PADDING WYTEX UNDRCST 6INX4YD

## (undated) DEVICE — ELECTRODE REM PLYHSV RETURN 9

## (undated) DEVICE — PAD DEFIB CADENCE ADULT R2

## (undated) DEVICE — KIT SYR REUSABLE

## (undated) DEVICE — SET IRR URLGY 2LINE UNIV SPIKE

## (undated) DEVICE — NDL PERC ENTRY BSDN 18-7.0

## (undated) DEVICE — CATH DXTERITY JR40 100CM 6FR

## (undated) DEVICE — K-WIRE TROCAR POINT 2X150MM
Type: IMPLANTABLE DEVICE | Site: PELVIS | Status: NON-FUNCTIONAL
Removed: 2024-08-16

## (undated) DEVICE — PAD CAST SPECIALIST STRL 4

## (undated) DEVICE — GUIDEWIRE ORTHO 1.6X150MM
Type: IMPLANTABLE DEVICE | Site: ANKLE | Status: NON-FUNCTIONAL
Removed: 2024-08-14

## (undated) DEVICE — PIN APEX SELF DRILL 5X180X50MM
Type: IMPLANTABLE DEVICE | Site: PELVIS | Status: NON-FUNCTIONAL
Removed: 2024-08-16

## (undated) DEVICE — BNDG COFLEX FOAM LF2 ST 6X5YD

## (undated) DEVICE — DRAPE U SPLIT SHEET 54X76IN

## (undated) DEVICE — TUBE SUCTION YANKAUER

## (undated) DEVICE — BIT DRILL ASNIS III 3.2X300MM

## (undated) DEVICE — DRAPE STERI INCISE 17X23IN

## (undated) DEVICE — DRAPE STERI-DRAPE 1000 17X11IN

## (undated) DEVICE — SLING SWATHE UNIVERSAL FOAM

## (undated) DEVICE — CATH DXTERITY JL40 100CM 6FR

## (undated) DEVICE — INTRODUCER PRELUDESNAP 7F 13CM

## (undated) DEVICE — TRAY SKIN SCRUB WET PREMIUM

## (undated) DEVICE — SUT VICRYL PLUS 3-0 SH 18IN

## (undated) DEVICE — GLOVE SURGICAL LATEX SZ 6.5

## (undated) DEVICE — SEE MEDLINE ITEM 146308

## (undated) DEVICE — CATH EAGLE EYE PLATINUM

## (undated) DEVICE — GUIDE LAUNCHER 5FR EBU 3.0

## (undated) DEVICE — COVER OVERHEAD SURG LT BLUE

## (undated) DEVICE — SUT ETHILON 3/0 18IN PS-1

## (undated) DEVICE — BIT DRILL AO 3.5X122MM

## (undated) DEVICE — SUT 2-0 VICRYL / SH (J417)

## (undated) DEVICE — ADHESIVE DERMABOND ADVANCED

## (undated) DEVICE — OMNIPAQUE 300MG 50ML

## (undated) DEVICE — KIT HAND CONTROL HIGH PRESSUR

## (undated) DEVICE — CLIP LIGATION MEDIUM CLIPS 1

## (undated) DEVICE — NDL 27G X 1 1/4

## (undated) DEVICE — BIT DRILL CALIBRATED 2.5MM

## (undated) DEVICE — CATH DXTERITY NOTO 100CM 6FR

## (undated) DEVICE — CATH ANGIO PRO FLO XT PGTL 6F

## (undated) DEVICE — OMNIPAQUE 350MG 150ML VIAL

## (undated) DEVICE — TRAY MINOR ORTHO OMC

## (undated) DEVICE — CATH EMERGE MR 8 X 2.00

## (undated) DEVICE — GAUZE SPONGE 4X4 12PLY

## (undated) DEVICE — ANGIOTOUCH KIT

## (undated) DEVICE — SYR 10CC LUER LOCK

## (undated) DEVICE — DRAPE C-ARM ELAS CLIP 42X120IN

## (undated) DEVICE — WIRE GUIDE SAFE-T-J .035 260CM

## (undated) DEVICE — HOOD T7 W/ PEEL AWAY LENS

## (undated) DEVICE — BOWL STERILE LARGE 32OZ

## (undated) DEVICE — SPONGE COTTON TRAY 4X4IN

## (undated) DEVICE — SUT VICRYL+ 1 CT1 18IN

## (undated) DEVICE — DRAPE TOP 53X102IN

## (undated) DEVICE — PACK CATH LAB

## (undated) DEVICE — INFLATOR ADVANTAGE ENCORE 26

## (undated) DEVICE — CATH EMERGE MR 15 X 2.25

## (undated) DEVICE — GUIDE LAUNCHER 6FR EBU 3.5

## (undated) DEVICE — PAD RADI FEMORAL

## (undated) DEVICE — DRESSING COVER AQUACEL AG SURG

## (undated) DEVICE — BNDG COFLEX FOAM LF2 ST 4X5YD

## (undated) DEVICE — SUT MONOCRYL 3-0 PS-2 UND

## (undated) DEVICE — BANDAGE ESMARK 6X12

## (undated) DEVICE — INTRODUCER CATH 6F 11CM

## (undated) DEVICE — TAPE SURG DURAPORE 2 X10YD

## (undated) DEVICE — BANDAGE ELAS SOFTWRAP ST 6X5YD

## (undated) DEVICE — PENCIL VALLEYLAB TELSCP SMK

## (undated) DEVICE — SYS PRINEO SKIN CLOSURE

## (undated) DEVICE — CATH DXTERITY PIGSTR 110CM 6FR

## (undated) DEVICE — NDL 18GA X1 1/2 REG BEVEL

## (undated) DEVICE — TIP YANKAUERS BULB NO VENT

## (undated) DEVICE — SEE MEDLINE ITEM 152622

## (undated) DEVICE — APPLICATOR CHLORAPREP ORN 26ML

## (undated) DEVICE — PAD PREP 50/CA

## (undated) DEVICE — SHEATH INTRO PINNACLE 6F 10CM

## (undated) DEVICE — STOCKINET 4INX48

## (undated) DEVICE — TOURNIQUET SB QC DP 34X4IN

## (undated) DEVICE — HEMOSTAT VASC BAND REG 24CM

## (undated) DEVICE — CATH EMPULSE ANGLED 5FR PIGTAI

## (undated) DEVICE — WIRE GUIDE HI TRQ BAL

## (undated) DEVICE — PACK PM MEADOWCREST CUSTOM

## (undated) DEVICE — GUIDE DRILL AO 2.6X70MM

## (undated) DEVICE — SUT BLU BR 2 TAPERD NDL 1/2

## (undated) DEVICE — SUT SILK 0 SH 30IN BLK BR

## (undated) DEVICE — CANISTER SUCTION 2 LTR

## (undated) DEVICE — SUT VICRYL PLUS 0 CT1 18IN

## (undated) DEVICE — IMPLANTABLE DEVICE
Type: IMPLANTABLE DEVICE | Site: ANKLE | Status: NON-FUNCTIONAL
Removed: 2024-08-14

## (undated) DEVICE — SUT MONOCRYL 3-0 PS-1

## (undated) DEVICE — ELECTRODE EDGE SYSTEM RTS

## (undated) DEVICE — PACK ECLIPSE UNIVERSAL STERILE

## (undated) DEVICE — BLANKET UPPER BODY 78.7X29.9IN

## (undated) DEVICE — SUT ETHIBOND XTRA 1 OS-6

## (undated) DEVICE — CUP MEDICINE STERILE 2OZ

## (undated) DEVICE — CONTRAST OMNIPAQUE 300 100ML

## (undated) DEVICE — CONTRAST VISIPAQUE 150ML

## (undated) DEVICE — PACK ARTHROSCOPY W/ISO BAC

## (undated) DEVICE — GUIDE LAUNCHER 5FR EBU 3.5

## (undated) DEVICE — DRAPE C-ARMOR EQUIPMENT COVER

## (undated) DEVICE — BLADE SURG #15 CARBON STEEL

## (undated) DEVICE — DRAPE ORTH SPLIT 77X108IN

## (undated) DEVICE — KIT PREVENA PLUS

## (undated) DEVICE — KIT PROBE COVER WITH GEL

## (undated) DEVICE — DRESSING GAUZE XEROFORM 5X9

## (undated) DEVICE — SPONGE DERMACEA GAUZE 4X4

## (undated) DEVICE — SUT VICRYL PLUS 2-0 CT1 18

## (undated) DEVICE — SUT BONE WAX 2.5 GRMS 12/BX

## (undated) DEVICE — SEE MEDLINE ITEM 154981

## (undated) DEVICE — DRAPE T EXTRM SURG 121X128X90

## (undated) DEVICE — GUIDEWIRE RUNTHROUGH EF 180CM

## (undated) DEVICE — GUIDE LAUNCHER 6FR JR 4.0

## (undated) DEVICE — KIT GLIDESHEATH SLEND 6FR 10CM

## (undated) DEVICE — DRESSING TRANS 4X4 TEGADERM

## (undated) DEVICE — LAVAGE WOUND BACTISURE 1L BAG